# Patient Record
Sex: FEMALE | Race: WHITE | NOT HISPANIC OR LATINO | Employment: OTHER | URBAN - METROPOLITAN AREA
[De-identification: names, ages, dates, MRNs, and addresses within clinical notes are randomized per-mention and may not be internally consistent; named-entity substitution may affect disease eponyms.]

---

## 2017-08-18 DIAGNOSIS — R06.02 SOB (SHORTNESS OF BREATH): Primary | ICD-10-CM

## 2017-08-23 ENCOUNTER — HOSPITAL ENCOUNTER (OUTPATIENT)
Dept: CARDIOLOGY | Facility: CLINIC | Age: 77
Discharge: HOME | End: 2017-08-23
Attending: FAMILY MEDICINE | Admitting: FAMILY MEDICINE
Payer: COMMERCIAL

## 2017-08-23 DIAGNOSIS — R06.02 SOB (SHORTNESS OF BREATH): ICD-10-CM

## 2017-08-23 PROCEDURE — 93350 STRESS TTE ONLY: CPT | Mod: TC

## 2017-08-23 PROCEDURE — 93321 DOPPLER ECHO F-UP/LMTD STD: CPT | Mod: 26 | Performed by: INTERNAL MEDICINE

## 2017-08-23 PROCEDURE — 93018 CV STRESS TEST I&R ONLY: CPT | Performed by: INTERNAL MEDICINE

## 2017-08-23 PROCEDURE — 93016 CV STRESS TEST SUPVJ ONLY: CPT | Performed by: INTERNAL MEDICINE

## 2017-08-23 PROCEDURE — 93350 STRESS TTE ONLY: CPT | Mod: 26 | Performed by: INTERNAL MEDICINE

## 2017-08-23 PROCEDURE — 93325 DOPPLER ECHO COLOR FLOW MAPG: CPT | Mod: 26 | Performed by: INTERNAL MEDICINE

## 2019-01-04 ENCOUNTER — ANESTHESIA EVENT (OUTPATIENT)
Dept: SURGERY | Facility: CLINIC | Age: 79
DRG: 003 | End: 2019-01-04
Payer: COMMERCIAL

## 2019-01-04 ENCOUNTER — APPOINTMENT (OUTPATIENT)
Dept: GENERAL RADIOLOGY | Facility: CLINIC | Age: 79
DRG: 003 | End: 2019-01-04
Payer: COMMERCIAL

## 2019-01-04 ENCOUNTER — HOSPITAL ENCOUNTER (INPATIENT)
Facility: CLINIC | Age: 79
LOS: 25 days | Discharge: NURSING, REHAB OR LONG-TERM CARE | DRG: 003 | End: 2019-01-29
Attending: EMERGENCY MEDICINE | Admitting: THORACIC SURGERY (CARDIOTHORACIC VASCULAR SURGERY)
Payer: COMMERCIAL

## 2019-01-04 ENCOUNTER — APPOINTMENT (OUTPATIENT)
Dept: CT IMAGING | Facility: CLINIC | Age: 79
DRG: 003 | End: 2019-01-04
Payer: COMMERCIAL

## 2019-01-04 ENCOUNTER — ANESTHESIA (OUTPATIENT)
Dept: SURGERY | Facility: CLINIC | Age: 79
DRG: 003 | End: 2019-01-04
Payer: COMMERCIAL

## 2019-01-04 DIAGNOSIS — Z98.890 S/P AORTIC DISSECTION REPAIR: Primary | ICD-10-CM

## 2019-01-04 DIAGNOSIS — I71.010 DISSECTING ANEURYSM OF THORACIC AORTA, STANFORD TYPE A (H): ICD-10-CM

## 2019-01-04 PROBLEM — Z78.0 POSTMENOPAUSAL: Status: ACTIVE | Noted: 2017-02-20

## 2019-01-04 LAB
ABO + RH BLD: NORMAL
ABO + RH BLD: NORMAL
ALBUMIN SERPL-MCNC: 2.6 G/DL (ref 3.4–5)
ALBUMIN SERPL-MCNC: 3.6 G/DL (ref 3.4–5)
ALP SERPL-CCNC: 54 U/L (ref 40–150)
ALP SERPL-CCNC: 92 U/L (ref 40–150)
ALT SERPL W P-5'-P-CCNC: 24 U/L (ref 0–50)
ALT SERPL W P-5'-P-CCNC: 65 U/L (ref 0–50)
ANION GAP SERPL CALCULATED.3IONS-SCNC: 10 MMOL/L (ref 3–14)
ANION GAP SERPL CALCULATED.3IONS-SCNC: 13 MMOL/L (ref 3–14)
ANION GAP SERPL CALCULATED.3IONS-SCNC: 15 MMOL/L (ref 3–14)
APTT PPP: 34 SEC (ref 22–37)
APTT PPP: 47 SEC (ref 22–37)
AST SERPL W P-5'-P-CCNC: 147 U/L (ref 0–45)
AST SERPL W P-5'-P-CCNC: 27 U/L (ref 0–45)
BASE DEFICIT BLDA-SCNC: 2.2 MMOL/L
BASE DEFICIT BLDV-SCNC: 1.7 MMOL/L
BASOPHILS # BLD AUTO: 0.1 10E9/L (ref 0–0.2)
BASOPHILS NFR BLD AUTO: 0.3 %
BILIRUB SERPL-MCNC: 1.1 MG/DL (ref 0.2–1.3)
BILIRUB SERPL-MCNC: 1.3 MG/DL (ref 0.2–1.3)
BLD GP AB SCN SERPL QL: NORMAL
BLD PROD TYP BPU: NORMAL
BLD UNIT ID BPU: 0
BLOOD BANK CMNT PATIENT-IMP: NORMAL
BLOOD PRODUCT CODE: NORMAL
BPU ID: NORMAL
BUN SERPL-MCNC: 17 MG/DL (ref 7–30)
BUN SERPL-MCNC: 17 MG/DL (ref 7–30)
BUN SERPL-MCNC: 20 MG/DL (ref 7–30)
CA-I BLD-MCNC: 4.3 MG/DL (ref 4.4–5.2)
CA-I BLD-SCNC: 4.4 MG/DL (ref 4.4–5.2)
CALCIUM SERPL-MCNC: 7.2 MG/DL (ref 8.5–10.1)
CALCIUM SERPL-MCNC: 8.8 MG/DL (ref 8.5–10.1)
CALCIUM SERPL-MCNC: 9.2 MG/DL (ref 8.5–10.1)
CHLORIDE SERPL-SCNC: 105 MMOL/L (ref 94–109)
CHLORIDE SERPL-SCNC: 107 MMOL/L (ref 94–109)
CHLORIDE SERPL-SCNC: 109 MMOL/L (ref 94–109)
CO2 BLD-SCNC: 20 MMOL/L (ref 21–28)
CO2 BLD-SCNC: 20 MMOL/L (ref 21–28)
CO2 BLD-SCNC: 21 MMOL/L (ref 21–28)
CO2 BLD-SCNC: 23 MMOL/L (ref 21–28)
CO2 BLD-SCNC: 23 MMOL/L (ref 21–28)
CO2 BLD-SCNC: 25 MMOL/L (ref 21–28)
CO2 BLDCOV-SCNC: 20 MMOL/L (ref 21–28)
CO2 SERPL-SCNC: 21 MMOL/L (ref 20–32)
CO2 SERPL-SCNC: 22 MMOL/L (ref 20–32)
CO2 SERPL-SCNC: 24 MMOL/L (ref 20–32)
CPB APPLIED: ABNORMAL
CREAT SERPL-MCNC: 0.75 MG/DL (ref 0.52–1.04)
CREAT SERPL-MCNC: 0.8 MG/DL (ref 0.52–1.04)
CREAT SERPL-MCNC: 0.83 MG/DL (ref 0.52–1.04)
DIFFERENTIAL METHOD BLD: ABNORMAL
EOSINOPHIL # BLD AUTO: 0.1 10E9/L (ref 0–0.7)
EOSINOPHIL NFR BLD AUTO: 0.5 %
ERYTHROCYTE [DISTWIDTH] IN BLOOD BY AUTOMATED COUNT: 13.1 % (ref 10–15)
ERYTHROCYTE [DISTWIDTH] IN BLOOD BY AUTOMATED COUNT: 13.3 % (ref 10–15)
ERYTHROCYTE [DISTWIDTH] IN BLOOD BY AUTOMATED COUNT: 13.8 % (ref 10–15)
FIBRINOGEN PPP-MCNC: 148 MG/DL (ref 200–420)
GFR SERPL CREATININE-BSD FRML MDRD: 69 ML/MIN/{1.73_M2}
GFR SERPL CREATININE-BSD FRML MDRD: 72 ML/MIN/{1.73_M2}
GFR SERPL CREATININE-BSD FRML MDRD: 77 ML/MIN/{1.73_M2}
GLUCOSE BLDC GLUCOMTR-MCNC: 136 MG/DL (ref 70–99)
GLUCOSE BLDC GLUCOMTR-MCNC: 199 MG/DL (ref 70–99)
GLUCOSE BLDC GLUCOMTR-MCNC: 225 MG/DL (ref 70–99)
GLUCOSE BLDC GLUCOMTR-MCNC: 232 MG/DL (ref 70–99)
GLUCOSE SERPL-MCNC: 125 MG/DL (ref 70–99)
GLUCOSE SERPL-MCNC: 214 MG/DL (ref 70–99)
GLUCOSE SERPL-MCNC: 226 MG/DL (ref 70–99)
HBA1C MFR BLD: 5.3 % (ref 0–5.6)
HCO3 BLD-SCNC: 24 MMOL/L (ref 21–28)
HCO3 BLDV-SCNC: 25 MMOL/L (ref 21–28)
HCT VFR BLD AUTO: 19.7 % (ref 35–47)
HCT VFR BLD AUTO: 28.6 % (ref 35–47)
HCT VFR BLD AUTO: 39.7 % (ref 35–47)
HCT VFR BLD CALC: 20 %PCV (ref 35–47)
HCT VFR BLD CALC: 21 %PCV (ref 35–47)
HCT VFR BLD CALC: 22 %PCV (ref 35–47)
HCT VFR BLD CALC: 22 %PCV (ref 35–47)
HCT VFR BLD CALC: 26 %PCV (ref 35–47)
HCT VFR BLD CALC: 35 %PCV (ref 35–47)
HGB BLD CALC-MCNC: 11.9 G/DL (ref 11.7–15.7)
HGB BLD CALC-MCNC: 6.8 G/DL (ref 11.7–15.7)
HGB BLD CALC-MCNC: 7.1 G/DL (ref 11.7–15.7)
HGB BLD CALC-MCNC: 7.5 G/DL (ref 11.7–15.7)
HGB BLD CALC-MCNC: 7.5 G/DL (ref 11.7–15.7)
HGB BLD CALC-MCNC: 8.8 G/DL (ref 11.7–15.7)
HGB BLD-MCNC: 13.2 G/DL (ref 11.7–15.7)
HGB BLD-MCNC: 6.9 G/DL (ref 11.7–15.7)
HGB BLD-MCNC: 9.6 G/DL (ref 11.7–15.7)
IMM GRANULOCYTES # BLD: 0 10E9/L (ref 0–0.4)
IMM GRANULOCYTES NFR BLD: 0.2 %
INR PPP: 1.35 (ref 0.86–1.14)
INR PPP: 2.03 (ref 0.86–1.14)
LACTATE BLD-SCNC: 1.7 MMOL/L (ref 0.7–2)
LACTATE BLD-SCNC: 1.8 MMOL/L (ref 0.7–2.1)
LACTATE BLD-SCNC: 2.3 MMOL/L (ref 0.7–2.1)
LACTATE BLD-SCNC: 2.4 MMOL/L (ref 0.7–2)
LACTATE BLD-SCNC: 2.9 MMOL/L (ref 0.7–2.1)
LACTATE BLD-SCNC: 3.1 MMOL/L (ref 0.7–2)
LACTATE BLD-SCNC: 3.7 MMOL/L (ref 0.7–2.1)
LACTATE BLD-SCNC: 3.8 MMOL/L (ref 0.7–2.1)
LACTATE BLD-SCNC: 4.1 MMOL/L (ref 0.7–2.1)
LIPASE SERPL-CCNC: 145 U/L (ref 73–393)
LYMPHOCYTES # BLD AUTO: 1.7 10E9/L (ref 0.8–5.3)
LYMPHOCYTES NFR BLD AUTO: 9.5 %
MAGNESIUM SERPL-MCNC: 2.5 MG/DL (ref 1.6–2.3)
MCH RBC QN AUTO: 28.9 PG (ref 26.5–33)
MCH RBC QN AUTO: 30 PG (ref 26.5–33)
MCH RBC QN AUTO: 30.8 PG (ref 26.5–33)
MCHC RBC AUTO-ENTMCNC: 33.2 G/DL (ref 31.5–36.5)
MCHC RBC AUTO-ENTMCNC: 33.6 G/DL (ref 31.5–36.5)
MCHC RBC AUTO-ENTMCNC: 35 G/DL (ref 31.5–36.5)
MCV RBC AUTO: 86 FL (ref 78–100)
MCV RBC AUTO: 88 FL (ref 78–100)
MCV RBC AUTO: 90 FL (ref 78–100)
MONOCYTES # BLD AUTO: 1.2 10E9/L (ref 0–1.3)
MONOCYTES NFR BLD AUTO: 6.5 %
NEUTROPHILS # BLD AUTO: 14.7 10E9/L (ref 1.6–8.3)
NEUTROPHILS NFR BLD AUTO: 83 %
NRBC # BLD AUTO: 0 10*3/UL
NRBC BLD AUTO-RTO: 0 /100
NUM BPU REQUESTED: 2
NUM BPU REQUESTED: 2
NUM BPU REQUESTED: 4
NUM BPU REQUESTED: 4
OXYHGB MFR BLD: 98 % (ref 92–100)
OXYHGB MFR BLDV: 68 %
PCO2 BLD: 37 MM HG (ref 35–45)
PCO2 BLD: 38 MM HG (ref 35–45)
PCO2 BLD: 38 MM HG (ref 35–45)
PCO2 BLD: 41 MM HG (ref 35–45)
PCO2 BLD: 46 MM HG (ref 35–45)
PCO2 BLD: 64 MM HG (ref 35–45)
PCO2 BLD: 69 MM HG (ref 35–45)
PCO2 BLDV: 46 MM HG (ref 40–50)
PCO2 BLDV: 52 MM HG (ref 40–50)
PH BLD: 7.1 PH (ref 7.35–7.45)
PH BLD: 7.16 PH (ref 7.35–7.45)
PH BLD: 7.32 PH (ref 7.35–7.45)
PH BLD: 7.33 PH (ref 7.35–7.45)
PH BLD: 7.34 PH (ref 7.35–7.45)
PH BLD: 7.39 PH (ref 7.35–7.45)
PH BLD: 7.39 PH (ref 7.35–7.45)
PH BLDV: 7.24 PH (ref 7.32–7.43)
PH BLDV: 7.29 PH (ref 7.32–7.43)
PHOSPHATE SERPL-MCNC: 4.5 MG/DL (ref 2.5–4.5)
PLATELET # BLD AUTO: 122 10E9/L (ref 150–450)
PLATELET # BLD AUTO: 134 10E9/L (ref 150–450)
PLATELET # BLD AUTO: 227 10E9/L (ref 150–450)
PO2 BLD: 150 MM HG (ref 80–105)
PO2 BLD: 340 MM HG (ref 80–105)
PO2 BLD: 354 MM HG (ref 80–105)
PO2 BLD: 374 MM HG (ref 80–105)
PO2 BLD: 420 MM HG (ref 80–105)
PO2 BLD: 530 MM HG (ref 80–105)
PO2 BLD: 624 MM HG (ref 80–105)
PO2 BLDV: 38 MM HG (ref 25–47)
PO2 BLDV: 81 MM HG (ref 25–47)
POTASSIUM BLD-SCNC: 3.3 MMOL/L (ref 3.4–5.3)
POTASSIUM BLD-SCNC: 3.3 MMOL/L (ref 3.4–5.3)
POTASSIUM BLD-SCNC: 3.6 MMOL/L (ref 3.4–5.3)
POTASSIUM BLD-SCNC: 3.7 MMOL/L (ref 3.4–5.3)
POTASSIUM BLD-SCNC: 4.1 MMOL/L (ref 3.4–5.3)
POTASSIUM BLD-SCNC: 4.2 MMOL/L (ref 3.4–5.3)
POTASSIUM SERPL-SCNC: 3.1 MMOL/L (ref 3.4–5.3)
POTASSIUM SERPL-SCNC: 3.4 MMOL/L (ref 3.4–5.3)
POTASSIUM SERPL-SCNC: 3.6 MMOL/L (ref 3.4–5.3)
PROT SERPL-MCNC: 4.8 G/DL (ref 6.8–8.8)
PROT SERPL-MCNC: 6.8 G/DL (ref 6.8–8.8)
RADIOLOGIST FLAGS: ABNORMAL
RBC # BLD AUTO: 2.24 10E12/L (ref 3.8–5.2)
RBC # BLD AUTO: 3.32 10E12/L (ref 3.8–5.2)
RBC # BLD AUTO: 4.4 10E12/L (ref 3.8–5.2)
SAO2 % BLDA FROM PO2: 100 % (ref 92–100)
SAO2 % BLDV FROM PO2: 93 %
SODIUM BLD-SCNC: 137 MMOL/L (ref 133–144)
SODIUM BLD-SCNC: 139 MMOL/L (ref 133–144)
SODIUM BLD-SCNC: 141 MMOL/L (ref 133–144)
SODIUM BLD-SCNC: 142 MMOL/L (ref 133–144)
SODIUM SERPL-SCNC: 137 MMOL/L (ref 133–144)
SODIUM SERPL-SCNC: 144 MMOL/L (ref 133–144)
SODIUM SERPL-SCNC: 145 MMOL/L (ref 133–144)
SPECIMEN EXP DATE BLD: NORMAL
TRANSFUSION STATUS PATIENT QL: NORMAL
TROPONIN I SERPL-MCNC: <0.015 UG/L (ref 0–0.04)
WBC # BLD AUTO: 11.9 10E9/L (ref 4–11)
WBC # BLD AUTO: 15.1 10E9/L (ref 4–11)
WBC # BLD AUTO: 17.7 10E9/L (ref 4–11)

## 2019-01-04 PROCEDURE — 25000128 H RX IP 250 OP 636: Performed by: STUDENT IN AN ORGANIZED HEALTH CARE EDUCATION/TRAINING PROGRAM

## 2019-01-04 PROCEDURE — C1781 MESH (IMPLANTABLE): HCPCS | Performed by: THORACIC SURGERY (CARDIOTHORACIC VASCULAR SURGERY)

## 2019-01-04 PROCEDURE — P9041 ALBUMIN (HUMAN),5%, 50ML: HCPCS | Performed by: STUDENT IN AN ORGANIZED HEALTH CARE EDUCATION/TRAINING PROGRAM

## 2019-01-04 PROCEDURE — 85025 COMPLETE CBC W/AUTO DIFF WBC: CPT | Performed by: EMERGENCY MEDICINE

## 2019-01-04 PROCEDURE — 88305 TISSUE EXAM BY PATHOLOGIST: CPT | Mod: 26 | Performed by: THORACIC SURGERY (CARDIOTHORACIC VASCULAR SURGERY)

## 2019-01-04 PROCEDURE — 83605 ASSAY OF LACTIC ACID: CPT

## 2019-01-04 PROCEDURE — 96374 THER/PROPH/DIAG INJ IV PUSH: CPT | Mod: 59

## 2019-01-04 PROCEDURE — P9041 ALBUMIN (HUMAN),5%, 50ML: HCPCS | Performed by: NURSE ANESTHETIST, CERTIFIED REGISTERED

## 2019-01-04 PROCEDURE — P9037 PLATE PHERES LEUKOREDU IRRAD: HCPCS | Performed by: STUDENT IN AN ORGANIZED HEALTH CARE EDUCATION/TRAINING PROGRAM

## 2019-01-04 PROCEDURE — P9016 RBC LEUKOCYTES REDUCED: HCPCS | Performed by: EMERGENCY MEDICINE

## 2019-01-04 PROCEDURE — 82803 BLOOD GASES ANY COMBINATION: CPT

## 2019-01-04 PROCEDURE — 94002 VENT MGMT INPAT INIT DAY: CPT

## 2019-01-04 PROCEDURE — 25000131 ZZH RX MED GY IP 250 OP 636 PS 637: Performed by: THORACIC SURGERY (CARDIOTHORACIC VASCULAR SURGERY)

## 2019-01-04 PROCEDURE — 25000125 ZZHC RX 250: Performed by: NURSE ANESTHETIST, CERTIFIED REGISTERED

## 2019-01-04 PROCEDURE — 25000128 H RX IP 250 OP 636: Performed by: NURSE ANESTHETIST, CERTIFIED REGISTERED

## 2019-01-04 PROCEDURE — 86850 RBC ANTIBODY SCREEN: CPT | Performed by: EMERGENCY MEDICINE

## 2019-01-04 PROCEDURE — 93005 ELECTROCARDIOGRAM TRACING: CPT

## 2019-01-04 PROCEDURE — 93010 ELECTROCARDIOGRAM REPORT: CPT | Performed by: INTERNAL MEDICINE

## 2019-01-04 PROCEDURE — 80048 BASIC METABOLIC PNL TOTAL CA: CPT

## 2019-01-04 PROCEDURE — 82805 BLOOD GASES W/O2 SATURATION: CPT | Performed by: STUDENT IN AN ORGANIZED HEALTH CARE EDUCATION/TRAINING PROGRAM

## 2019-01-04 PROCEDURE — 83735 ASSAY OF MAGNESIUM: CPT | Performed by: STUDENT IN AN ORGANIZED HEALTH CARE EDUCATION/TRAINING PROGRAM

## 2019-01-04 PROCEDURE — P9059 PLASMA, FRZ BETWEEN 8-24HOUR: HCPCS | Performed by: STUDENT IN AN ORGANIZED HEALTH CARE EDUCATION/TRAINING PROGRAM

## 2019-01-04 PROCEDURE — 74177 CT ABD & PELVIS W/CONTRAST: CPT

## 2019-01-04 PROCEDURE — 84100 ASSAY OF PHOSPHORUS: CPT | Performed by: STUDENT IN AN ORGANIZED HEALTH CARE EDUCATION/TRAINING PROGRAM

## 2019-01-04 PROCEDURE — 83036 HEMOGLOBIN GLYCOSYLATED A1C: CPT | Performed by: EMERGENCY MEDICINE

## 2019-01-04 PROCEDURE — 80053 COMPREHEN METABOLIC PANEL: CPT | Performed by: STUDENT IN AN ORGANIZED HEALTH CARE EDUCATION/TRAINING PROGRAM

## 2019-01-04 PROCEDURE — 40000344 ZZHCL STATISTIC THAWING COMPONENT: Performed by: STUDENT IN AN ORGANIZED HEALTH CARE EDUCATION/TRAINING PROGRAM

## 2019-01-04 PROCEDURE — 71260 CT THORAX DX C+: CPT

## 2019-01-04 PROCEDURE — 02RX0JZ REPLACEMENT OF THORACIC AORTA, ASCENDING/ARCH WITH SYNTHETIC SUBSTITUTE, OPEN APPROACH: ICD-10-PCS | Performed by: THORACIC SURGERY (CARDIOTHORACIC VASCULAR SURGERY)

## 2019-01-04 PROCEDURE — 41000023 ZZH PERA-PERFUSION, SH ONLY,  EACH ADDTL 15 MIN: Performed by: THORACIC SURGERY (CARDIOTHORACIC VASCULAR SURGERY)

## 2019-01-04 PROCEDURE — 36000073 ZZH SURGERY LEVEL 6 1ST 30 MIN: Performed by: THORACIC SURGERY (CARDIOTHORACIC VASCULAR SURGERY)

## 2019-01-04 PROCEDURE — 25000128 H RX IP 250 OP 636: Performed by: SURGERY

## 2019-01-04 PROCEDURE — 82330 ASSAY OF CALCIUM: CPT

## 2019-01-04 PROCEDURE — 25000566 ZZH SEVOFLURANE, EA 15 MIN: Performed by: THORACIC SURGERY (CARDIOTHORACIC VASCULAR SURGERY)

## 2019-01-04 PROCEDURE — 88305 TISSUE EXAM BY PATHOLOGIST: CPT | Performed by: THORACIC SURGERY (CARDIOTHORACIC VASCULAR SURGERY)

## 2019-01-04 PROCEDURE — 5A1221Z PERFORMANCE OF CARDIAC OUTPUT, CONTINUOUS: ICD-10-PCS | Performed by: THORACIC SURGERY (CARDIOTHORACIC VASCULAR SURGERY)

## 2019-01-04 PROCEDURE — 85730 THROMBOPLASTIN TIME PARTIAL: CPT | Performed by: STUDENT IN AN ORGANIZED HEALTH CARE EDUCATION/TRAINING PROGRAM

## 2019-01-04 PROCEDURE — 25000128 H RX IP 250 OP 636: Performed by: EMERGENCY MEDICINE

## 2019-01-04 PROCEDURE — 36000075 ZZH SURGERY LEVEL 6 EA 15 ADDTL MIN: Performed by: THORACIC SURGERY (CARDIOTHORACIC VASCULAR SURGERY)

## 2019-01-04 PROCEDURE — C1768 GRAFT, VASCULAR: HCPCS | Performed by: THORACIC SURGERY (CARDIOTHORACIC VASCULAR SURGERY)

## 2019-01-04 PROCEDURE — C1763 CONN TISS, NON-HUMAN: HCPCS | Performed by: THORACIC SURGERY (CARDIOTHORACIC VASCULAR SURGERY)

## 2019-01-04 PROCEDURE — 20000003 ZZH R&B ICU

## 2019-01-04 PROCEDURE — C1894 INTRO/SHEATH, NON-LASER: HCPCS | Performed by: THORACIC SURGERY (CARDIOTHORACIC VASCULAR SURGERY)

## 2019-01-04 PROCEDURE — 80053 COMPREHEN METABOLIC PANEL: CPT | Performed by: EMERGENCY MEDICINE

## 2019-01-04 PROCEDURE — 83690 ASSAY OF LIPASE: CPT | Performed by: EMERGENCY MEDICINE

## 2019-01-04 PROCEDURE — P9012 CRYOPRECIPITATE EACH UNIT: HCPCS | Performed by: STUDENT IN AN ORGANIZED HEALTH CARE EDUCATION/TRAINING PROGRAM

## 2019-01-04 PROCEDURE — 85027 COMPLETE CBC AUTOMATED: CPT

## 2019-01-04 PROCEDURE — 86900 BLOOD TYPING SEROLOGIC ABO: CPT | Performed by: EMERGENCY MEDICINE

## 2019-01-04 PROCEDURE — 25000128 H RX IP 250 OP 636: Performed by: THORACIC SURGERY (CARDIOTHORACIC VASCULAR SURGERY)

## 2019-01-04 PROCEDURE — 40000008 ZZH STATISTIC AIRWAY CARE

## 2019-01-04 PROCEDURE — 37000008 ZZH ANESTHESIA TECHNICAL FEE, 1ST 30 MIN: Performed by: THORACIC SURGERY (CARDIOTHORACIC VASCULAR SURGERY)

## 2019-01-04 PROCEDURE — 37000009 ZZH ANESTHESIA TECHNICAL FEE, EACH ADDTL 15 MIN: Performed by: THORACIC SURGERY (CARDIOTHORACIC VASCULAR SURGERY)

## 2019-01-04 PROCEDURE — 85610 PROTHROMBIN TIME: CPT | Performed by: STUDENT IN AN ORGANIZED HEALTH CARE EDUCATION/TRAINING PROGRAM

## 2019-01-04 PROCEDURE — 96375 TX/PRO/DX INJ NEW DRUG ADDON: CPT

## 2019-01-04 PROCEDURE — 27211024 ZZHC OR SUPPLY OTHER OPNP: Performed by: THORACIC SURGERY (CARDIOTHORACIC VASCULAR SURGERY)

## 2019-01-04 PROCEDURE — 25000301 ZZH OR RX SURGIFLO W/THROMBIN KIT 2ML 1991 OPNP: Performed by: THORACIC SURGERY (CARDIOTHORACIC VASCULAR SURGERY)

## 2019-01-04 PROCEDURE — 00000146 ZZHCL STATISTIC GLUCOSE BY METER IP

## 2019-01-04 PROCEDURE — 99285 EMERGENCY DEPT VISIT HI MDM: CPT | Mod: 25

## 2019-01-04 PROCEDURE — 25000125 ZZHC RX 250: Performed by: SURGERY

## 2019-01-04 PROCEDURE — 85027 COMPLETE CBC AUTOMATED: CPT | Performed by: STUDENT IN AN ORGANIZED HEALTH CARE EDUCATION/TRAINING PROGRAM

## 2019-01-04 PROCEDURE — 86923 COMPATIBILITY TEST ELECTRIC: CPT | Performed by: EMERGENCY MEDICINE

## 2019-01-04 PROCEDURE — 40000275 ZZH STATISTIC RCP TIME EA 10 MIN

## 2019-01-04 PROCEDURE — 82330 ASSAY OF CALCIUM: CPT | Performed by: STUDENT IN AN ORGANIZED HEALTH CARE EDUCATION/TRAINING PROGRAM

## 2019-01-04 PROCEDURE — 84132 ASSAY OF SERUM POTASSIUM: CPT

## 2019-01-04 PROCEDURE — 84484 ASSAY OF TROPONIN QUANT: CPT | Performed by: EMERGENCY MEDICINE

## 2019-01-04 PROCEDURE — C1769 GUIDE WIRE: HCPCS | Performed by: THORACIC SURGERY (CARDIOTHORACIC VASCULAR SURGERY)

## 2019-01-04 PROCEDURE — 85730 THROMBOPLASTIN TIME PARTIAL: CPT

## 2019-01-04 PROCEDURE — 25800025 ZZH RX 258: Performed by: STUDENT IN AN ORGANIZED HEALTH CARE EDUCATION/TRAINING PROGRAM

## 2019-01-04 PROCEDURE — 86901 BLOOD TYPING SEROLOGIC RH(D): CPT | Performed by: EMERGENCY MEDICINE

## 2019-01-04 PROCEDURE — 99291 CRITICAL CARE FIRST HOUR: CPT | Performed by: INTERNAL MEDICINE

## 2019-01-04 PROCEDURE — 51702 INSERT TEMP BLADDER CATH: CPT

## 2019-01-04 PROCEDURE — 83605 ASSAY OF LACTIC ACID: CPT | Performed by: STUDENT IN AN ORGANIZED HEALTH CARE EDUCATION/TRAINING PROGRAM

## 2019-01-04 PROCEDURE — 85384 FIBRINOGEN ACTIVITY: CPT

## 2019-01-04 PROCEDURE — 85014 HEMATOCRIT: CPT

## 2019-01-04 PROCEDURE — 40000986 XR CHEST PORT 1 VW

## 2019-01-04 PROCEDURE — 84295 ASSAY OF SERUM SODIUM: CPT

## 2019-01-04 PROCEDURE — 25000125 ZZHC RX 250: Performed by: EMERGENCY MEDICINE

## 2019-01-04 PROCEDURE — 85610 PROTHROMBIN TIME: CPT

## 2019-01-04 PROCEDURE — 71046 X-RAY EXAM CHEST 2 VIEWS: CPT

## 2019-01-04 PROCEDURE — 41000022 ZZH PER-PERFUSION, SH ONLY,  1ST 30 MIN: Performed by: THORACIC SURGERY (CARDIOTHORACIC VASCULAR SURGERY)

## 2019-01-04 PROCEDURE — 27210794 ZZH OR GENERAL SUPPLY STERILE: Performed by: THORACIC SURGERY (CARDIOTHORACIC VASCULAR SURGERY)

## 2019-01-04 PROCEDURE — 40000277 XR SURGERY CARM FLUORO LESS THAN 5 MIN W STILLS

## 2019-01-04 DEVICE — GRAFT PTFE FELT 6X6" 007837: Type: IMPLANTABLE DEVICE | Site: AORTA | Status: FUNCTIONAL

## 2019-01-04 DEVICE — GRAFT PERICARDIUM 8X14CM PERI-GUARD PG0814: Type: IMPLANTABLE DEVICE | Site: AORTA | Status: FUNCTIONAL

## 2019-01-04 DEVICE — IMPLANTABLE DEVICE: Type: IMPLANTABLE DEVICE | Status: FUNCTIONAL

## 2019-01-04 DEVICE — LEAD PACER MYOCARDIAL BIPOLAR TEMPORARY 53CM 6495F
Type: IMPLANTABLE DEVICE | Status: NON-FUNCTIONAL
Removed: 2019-01-11

## 2019-01-04 RX ORDER — TRIAMTERENE/HYDROCHLOROTHIAZID 37.5-25 MG
0.5 TABLET ORAL DAILY
Status: ON HOLD | COMMUNITY
Start: 2017-06-30 | End: 2019-01-29

## 2019-01-04 RX ORDER — DEXTROSE MONOHYDRATE 25 G/50ML
25-50 INJECTION, SOLUTION INTRAVENOUS
Status: DISCONTINUED | OUTPATIENT
Start: 2019-01-04 | End: 2019-01-29 | Stop reason: HOSPADM

## 2019-01-04 RX ORDER — FENTANYL CITRATE 50 UG/ML
INJECTION, SOLUTION INTRAMUSCULAR; INTRAVENOUS PRN
Status: DISCONTINUED | OUTPATIENT
Start: 2019-01-04 | End: 2019-01-04

## 2019-01-04 RX ORDER — POTASSIUM CHLORIDE 1.5 G/1.58G
20-40 POWDER, FOR SOLUTION ORAL
Status: DISCONTINUED | OUTPATIENT
Start: 2019-01-04 | End: 2019-01-29 | Stop reason: HOSPADM

## 2019-01-04 RX ORDER — ACETAMINOPHEN 325 MG/1
650 TABLET ORAL EVERY 4 HOURS PRN
Status: DISCONTINUED | OUTPATIENT
Start: 2019-01-07 | End: 2019-01-29 | Stop reason: HOSPADM

## 2019-01-04 RX ORDER — CALCIUM CHLORIDE 100 MG/ML
INJECTION INTRAVENOUS; INTRAVENTRICULAR PRN
Status: DISCONTINUED | OUTPATIENT
Start: 2019-01-04 | End: 2019-01-04

## 2019-01-04 RX ORDER — SODIUM CHLORIDE 9 MG/ML
INJECTION, SOLUTION INTRAVENOUS CONTINUOUS
Status: DISCONTINUED | OUTPATIENT
Start: 2019-01-04 | End: 2019-01-29 | Stop reason: HOSPADM

## 2019-01-04 RX ORDER — CLINDAMYCIN PHOSPHATE 900 MG/50ML
900 INJECTION, SOLUTION INTRAVENOUS SEE ADMIN INSTRUCTIONS
Status: CANCELLED | OUTPATIENT
Start: 2019-01-04

## 2019-01-04 RX ORDER — NITROGLYCERIN 10 MG/100ML
INJECTION INTRAVENOUS PRN
Status: DISCONTINUED | OUTPATIENT
Start: 2019-01-04 | End: 2019-01-04

## 2019-01-04 RX ORDER — VANCOMYCIN HYDROCHLORIDE 1 G/200ML
1000 INJECTION, SOLUTION INTRAVENOUS SEE ADMIN INSTRUCTIONS
Status: CANCELLED | OUTPATIENT
Start: 2019-01-04

## 2019-01-04 RX ORDER — PROPOFOL 10 MG/ML
INJECTION, EMULSION INTRAVENOUS CONTINUOUS PRN
Status: DISCONTINUED | OUTPATIENT
Start: 2019-01-04 | End: 2019-01-04

## 2019-01-04 RX ORDER — MEPERIDINE HYDROCHLORIDE 25 MG/ML
12.5-25 INJECTION INTRAMUSCULAR; INTRAVENOUS; SUBCUTANEOUS
Status: DISCONTINUED | OUTPATIENT
Start: 2019-01-04 | End: 2019-01-18

## 2019-01-04 RX ORDER — OXYCODONE HYDROCHLORIDE 5 MG/1
5-10 TABLET ORAL EVERY 4 HOURS PRN
Status: DISCONTINUED | OUTPATIENT
Start: 2019-01-04 | End: 2019-01-29 | Stop reason: HOSPADM

## 2019-01-04 RX ORDER — METOPROLOL TARTRATE 25 MG/1
25 TABLET, FILM COATED ORAL EVERY 12 HOURS
Status: DISCONTINUED | OUTPATIENT
Start: 2019-01-05 | End: 2019-01-06 | Stop reason: DRUGHIGH

## 2019-01-04 RX ORDER — DEXTROSE MONOHYDRATE, SODIUM CHLORIDE, AND POTASSIUM CHLORIDE 50; 1.49; 4.5 G/1000ML; G/1000ML; G/1000ML
INJECTION, SOLUTION INTRAVENOUS CONTINUOUS
Status: DISCONTINUED | OUTPATIENT
Start: 2019-01-04 | End: 2019-01-06

## 2019-01-04 RX ORDER — LIDOCAINE HYDROCHLORIDE 20 MG/ML
INJECTION, SOLUTION INFILTRATION; PERINEURAL PRN
Status: DISCONTINUED | OUTPATIENT
Start: 2019-01-04 | End: 2019-01-04

## 2019-01-04 RX ORDER — CLINDAMYCIN PHOSPHATE 900 MG/50ML
900 INJECTION, SOLUTION INTRAVENOUS
Status: CANCELLED | OUTPATIENT
Start: 2019-01-04

## 2019-01-04 RX ORDER — HEPARIN SODIUM 1000 [USP'U]/ML
INJECTION, SOLUTION INTRAVENOUS; SUBCUTANEOUS PRN
Status: DISCONTINUED | OUTPATIENT
Start: 2019-01-04 | End: 2019-01-04

## 2019-01-04 RX ORDER — NICOTINE POLACRILEX 4 MG
15-30 LOZENGE BUCCAL
Status: DISCONTINUED | OUTPATIENT
Start: 2019-01-04 | End: 2019-01-29 | Stop reason: HOSPADM

## 2019-01-04 RX ORDER — DEXTROSE MONOHYDRATE 25 G/50ML
25-50 INJECTION, SOLUTION INTRAVENOUS
Status: DISCONTINUED | OUTPATIENT
Start: 2019-01-04 | End: 2019-01-04

## 2019-01-04 RX ORDER — IOPAMIDOL 755 MG/ML
80 INJECTION, SOLUTION INTRAVASCULAR ONCE
Status: COMPLETED | OUTPATIENT
Start: 2019-01-04 | End: 2019-01-04

## 2019-01-04 RX ORDER — ESMOLOL HYDROCHLORIDE 10 MG/ML
INJECTION INTRAVENOUS PRN
Status: DISCONTINUED | OUTPATIENT
Start: 2019-01-04 | End: 2019-01-04

## 2019-01-04 RX ORDER — ALBUMIN, HUMAN INJ 5% 5 %
500-1000 SOLUTION INTRAVENOUS
Status: COMPLETED | OUTPATIENT
Start: 2019-01-04 | End: 2019-01-04

## 2019-01-04 RX ORDER — POTASSIUM CL/LIDO/0.9 % NACL 10MEQ/0.1L
10 INTRAVENOUS SOLUTION, PIGGYBACK (ML) INTRAVENOUS
Status: DISCONTINUED | OUTPATIENT
Start: 2019-01-04 | End: 2019-01-29 | Stop reason: HOSPADM

## 2019-01-04 RX ORDER — PROPOFOL 10 MG/ML
INJECTION, EMULSION INTRAVENOUS PRN
Status: DISCONTINUED | OUTPATIENT
Start: 2019-01-04 | End: 2019-01-04

## 2019-01-04 RX ORDER — VANCOMYCIN HYDROCHLORIDE 1 G/200ML
1000 INJECTION, SOLUTION INTRAVENOUS EVERY 12 HOURS
Status: COMPLETED | OUTPATIENT
Start: 2019-01-05 | End: 2019-01-05

## 2019-01-04 RX ORDER — ACETAMINOPHEN 325 MG/1
975 TABLET ORAL ONCE
Status: CANCELLED | OUTPATIENT
Start: 2019-01-04 | End: 2019-01-04

## 2019-01-04 RX ORDER — POTASSIUM CHLORIDE 1500 MG/1
20-40 TABLET, EXTENDED RELEASE ORAL
Status: DISCONTINUED | OUTPATIENT
Start: 2019-01-04 | End: 2019-01-29 | Stop reason: HOSPADM

## 2019-01-04 RX ORDER — ONDANSETRON 2 MG/ML
INJECTION INTRAMUSCULAR; INTRAVENOUS
Status: DISCONTINUED
Start: 2019-01-04 | End: 2019-01-04 | Stop reason: HOSPADM

## 2019-01-04 RX ORDER — ALBUMIN, HUMAN INJ 5% 5 %
SOLUTION INTRAVENOUS CONTINUOUS PRN
Status: DISCONTINUED | OUTPATIENT
Start: 2019-01-04 | End: 2019-01-04

## 2019-01-04 RX ORDER — VANCOMYCIN HYDROCHLORIDE 1 G/200ML
1000 INJECTION, SOLUTION INTRAVENOUS
Status: CANCELLED | OUTPATIENT
Start: 2019-01-04

## 2019-01-04 RX ORDER — SODIUM CHLORIDE 9 MG/ML
INJECTION, SOLUTION INTRAVENOUS CONTINUOUS PRN
Status: DISCONTINUED | OUTPATIENT
Start: 2019-01-04 | End: 2019-01-04

## 2019-01-04 RX ORDER — ONDANSETRON 4 MG/1
4 TABLET, ORALLY DISINTEGRATING ORAL EVERY 6 HOURS PRN
Status: DISCONTINUED | OUTPATIENT
Start: 2019-01-04 | End: 2019-01-29 | Stop reason: HOSPADM

## 2019-01-04 RX ORDER — SODIUM CHLORIDE, SODIUM LACTATE, POTASSIUM CHLORIDE, CALCIUM CHLORIDE 600; 310; 30; 20 MG/100ML; MG/100ML; MG/100ML; MG/100ML
INJECTION, SOLUTION INTRAVENOUS CONTINUOUS PRN
Status: DISCONTINUED | OUTPATIENT
Start: 2019-01-04 | End: 2019-01-04

## 2019-01-04 RX ORDER — MUPIROCIN 20 MG/G
0.5 OINTMENT TOPICAL 2 TIMES DAILY
Status: DISCONTINUED | OUTPATIENT
Start: 2019-01-04 | End: 2019-01-06

## 2019-01-04 RX ORDER — CEFAZOLIN SODIUM 1 G/3ML
1 INJECTION, POWDER, FOR SOLUTION INTRAMUSCULAR; INTRAVENOUS EVERY 8 HOURS
Status: COMPLETED | OUTPATIENT
Start: 2019-01-05 | End: 2019-01-05

## 2019-01-04 RX ORDER — LIDOCAINE 40 MG/G
CREAM TOPICAL
Status: DISCONTINUED | OUTPATIENT
Start: 2019-01-04 | End: 2019-01-25

## 2019-01-04 RX ORDER — POTASSIUM CHLORIDE 7.45 MG/ML
10 INJECTION INTRAVENOUS
Status: DISCONTINUED | OUTPATIENT
Start: 2019-01-04 | End: 2019-01-29 | Stop reason: HOSPADM

## 2019-01-04 RX ORDER — METHOCARBAMOL 500 MG/1
500 TABLET, FILM COATED ORAL 4 TIMES DAILY PRN
Status: DISCONTINUED | OUTPATIENT
Start: 2019-01-04 | End: 2019-01-13

## 2019-01-04 RX ORDER — GABAPENTIN 100 MG/1
100 CAPSULE ORAL 3 TIMES DAILY
Status: DISCONTINUED | OUTPATIENT
Start: 2019-01-04 | End: 2019-01-07

## 2019-01-04 RX ORDER — ONDANSETRON 2 MG/ML
8 INJECTION INTRAMUSCULAR; INTRAVENOUS ONCE
Status: COMPLETED | OUTPATIENT
Start: 2019-01-04 | End: 2019-01-04

## 2019-01-04 RX ORDER — ONDANSETRON 2 MG/ML
4 INJECTION INTRAMUSCULAR; INTRAVENOUS EVERY 6 HOURS PRN
Status: DISCONTINUED | OUTPATIENT
Start: 2019-01-04 | End: 2019-01-29 | Stop reason: HOSPADM

## 2019-01-04 RX ORDER — VECURONIUM BROMIDE 1 MG/ML
INJECTION, POWDER, LYOPHILIZED, FOR SOLUTION INTRAVENOUS PRN
Status: DISCONTINUED | OUTPATIENT
Start: 2019-01-04 | End: 2019-01-04

## 2019-01-04 RX ORDER — FENTANYL CITRATE 50 UG/ML
25-100 INJECTION, SOLUTION INTRAMUSCULAR; INTRAVENOUS
Status: DISCONTINUED | OUTPATIENT
Start: 2019-01-04 | End: 2019-01-17 | Stop reason: ALTCHOICE

## 2019-01-04 RX ORDER — NALOXONE HYDROCHLORIDE 0.4 MG/ML
.1-.4 INJECTION, SOLUTION INTRAMUSCULAR; INTRAVENOUS; SUBCUTANEOUS
Status: DISCONTINUED | OUTPATIENT
Start: 2019-01-04 | End: 2019-01-12

## 2019-01-04 RX ORDER — ONDANSETRON 2 MG/ML
4 INJECTION INTRAMUSCULAR; INTRAVENOUS ONCE
Status: COMPLETED | OUTPATIENT
Start: 2019-01-04 | End: 2019-01-04

## 2019-01-04 RX ORDER — PROTAMINE SULFATE 10 MG/ML
INJECTION, SOLUTION INTRAVENOUS PRN
Status: DISCONTINUED | OUTPATIENT
Start: 2019-01-04 | End: 2019-01-04

## 2019-01-04 RX ORDER — ACETAMINOPHEN 325 MG/1
975 TABLET ORAL EVERY 8 HOURS
Status: DISPENSED | OUTPATIENT
Start: 2019-01-04 | End: 2019-01-07

## 2019-01-04 RX ORDER — POTASSIUM CHLORIDE 29.8 MG/ML
20 INJECTION INTRAVENOUS
Status: DISCONTINUED | OUTPATIENT
Start: 2019-01-04 | End: 2019-01-29 | Stop reason: HOSPADM

## 2019-01-04 RX ORDER — MAGNESIUM SULFATE HEPTAHYDRATE 40 MG/ML
4 INJECTION, SOLUTION INTRAVENOUS EVERY 4 HOURS PRN
Status: DISCONTINUED | OUTPATIENT
Start: 2019-01-04 | End: 2019-01-29 | Stop reason: HOSPADM

## 2019-01-04 RX ORDER — NICOTINE POLACRILEX 4 MG
15-30 LOZENGE BUCCAL
Status: DISCONTINUED | OUTPATIENT
Start: 2019-01-04 | End: 2019-01-04

## 2019-01-04 RX ORDER — CEFAZOLIN SODIUM 1 G/3ML
INJECTION, POWDER, FOR SOLUTION INTRAMUSCULAR; INTRAVENOUS PRN
Status: DISCONTINUED | OUTPATIENT
Start: 2019-01-04 | End: 2019-01-04

## 2019-01-04 RX ORDER — HYDRALAZINE HYDROCHLORIDE 20 MG/ML
10 INJECTION INTRAMUSCULAR; INTRAVENOUS EVERY 30 MIN PRN
Status: DISCONTINUED | OUTPATIENT
Start: 2019-01-04 | End: 2019-01-29 | Stop reason: HOSPADM

## 2019-01-04 RX ORDER — MAGNESIUM SULFATE HEPTAHYDRATE 40 MG/ML
2 INJECTION, SOLUTION INTRAVENOUS DAILY PRN
Status: DISCONTINUED | OUTPATIENT
Start: 2019-01-04 | End: 2019-01-29 | Stop reason: HOSPADM

## 2019-01-04 RX ORDER — NITROGLYCERIN 20 MG/100ML
.07-2 INJECTION INTRAVENOUS CONTINUOUS
Status: DISCONTINUED | OUTPATIENT
Start: 2019-01-04 | End: 2019-01-05

## 2019-01-04 RX ADMIN — MIDAZOLAM HYDROCHLORIDE 1 MG: 1 INJECTION, SOLUTION INTRAMUSCULAR; INTRAVENOUS at 17:12

## 2019-01-04 RX ADMIN — PHENYLEPHRINE HYDROCHLORIDE 100 MCG: 10 INJECTION, SOLUTION INTRAMUSCULAR; INTRAVENOUS; SUBCUTANEOUS at 20:23

## 2019-01-04 RX ADMIN — PHENYLEPHRINE HYDROCHLORIDE 100 MCG: 10 INJECTION, SOLUTION INTRAMUSCULAR; INTRAVENOUS; SUBCUTANEOUS at 20:27

## 2019-01-04 RX ADMIN — ROCURONIUM BROMIDE 50 MG: 10 INJECTION INTRAVENOUS at 16:01

## 2019-01-04 RX ADMIN — CALCIUM CHLORIDE 200 MG: 100 INJECTION, SOLUTION INTRAVENOUS at 20:18

## 2019-01-04 RX ADMIN — FENTANYL CITRATE 100 MCG: 50 INJECTION, SOLUTION INTRAMUSCULAR; INTRAVENOUS at 16:26

## 2019-01-04 RX ADMIN — FENTANYL CITRATE 50 MCG: 50 INJECTION, SOLUTION INTRAMUSCULAR; INTRAVENOUS at 20:46

## 2019-01-04 RX ADMIN — PHENYLEPHRINE HYDROCHLORIDE 100 MCG: 10 INJECTION, SOLUTION INTRAMUSCULAR; INTRAVENOUS; SUBCUTANEOUS at 19:59

## 2019-01-04 RX ADMIN — PHENYLEPHRINE HYDROCHLORIDE 100 MCG: 10 INJECTION, SOLUTION INTRAMUSCULAR; INTRAVENOUS; SUBCUTANEOUS at 21:36

## 2019-01-04 RX ADMIN — LIDOCAINE HYDROCHLORIDE 100 MG: 20 INJECTION, SOLUTION INFILTRATION; PERINEURAL at 15:59

## 2019-01-04 RX ADMIN — VECURONIUM BROMIDE 5 MG: 1 INJECTION, POWDER, LYOPHILIZED, FOR SOLUTION INTRAVENOUS at 16:39

## 2019-01-04 RX ADMIN — PROTAMINE SULFATE 280 MG: 10 INJECTION, SOLUTION INTRAVENOUS at 19:57

## 2019-01-04 RX ADMIN — PHENYLEPHRINE HYDROCHLORIDE 100 MCG: 10 INJECTION, SOLUTION INTRAMUSCULAR; INTRAVENOUS; SUBCUTANEOUS at 17:40

## 2019-01-04 RX ADMIN — PHENYLEPHRINE HYDROCHLORIDE 100 MCG: 10 INJECTION, SOLUTION INTRAMUSCULAR; INTRAVENOUS; SUBCUTANEOUS at 20:08

## 2019-01-04 RX ADMIN — EPINEPHRINE 0.03 MCG/KG/MIN: 1 INJECTION INTRAMUSCULAR; INTRAVENOUS; SUBCUTANEOUS at 20:01

## 2019-01-04 RX ADMIN — NITROGLYCERIN 40 MCG: 10 INJECTION INTRAVENOUS at 16:17

## 2019-01-04 RX ADMIN — PHENYLEPHRINE HYDROCHLORIDE 200 MCG: 10 INJECTION, SOLUTION INTRAMUSCULAR; INTRAVENOUS; SUBCUTANEOUS at 20:59

## 2019-01-04 RX ADMIN — CEFAZOLIN 2 G: 1 INJECTION, POWDER, FOR SOLUTION INTRAMUSCULAR; INTRAVENOUS at 16:41

## 2019-01-04 RX ADMIN — Medication 5 MG/HR: at 16:23

## 2019-01-04 RX ADMIN — NITROGLYCERIN 20 MCG: 10 INJECTION INTRAVENOUS at 16:14

## 2019-01-04 RX ADMIN — AMINOCAPROIC ACID 1 G/HR: 250 INJECTION, SOLUTION INTRAVENOUS at 17:32

## 2019-01-04 RX ADMIN — FENTANYL CITRATE 50 MCG: 50 INJECTION, SOLUTION INTRAMUSCULAR; INTRAVENOUS at 20:45

## 2019-01-04 RX ADMIN — PHENYLEPHRINE HYDROCHLORIDE 100 MCG: 10 INJECTION, SOLUTION INTRAMUSCULAR; INTRAVENOUS; SUBCUTANEOUS at 21:44

## 2019-01-04 RX ADMIN — SODIUM CHLORIDE 70 ML: 9 INJECTION, SOLUTION INTRAVENOUS at 14:43

## 2019-01-04 RX ADMIN — PROPOFOL 25 MCG/KG/MIN: 10 INJECTION, EMULSION INTRAVENOUS at 21:04

## 2019-01-04 RX ADMIN — NOREPINEPHRINE BITARTRATE 0.03 MCG/KG/MIN: 1 INJECTION INTRAVENOUS at 19:22

## 2019-01-04 RX ADMIN — NITROGLYCERIN 20 MCG: 10 INJECTION INTRAVENOUS at 16:16

## 2019-01-04 RX ADMIN — ALBUMIN HUMAN: 0.05 INJECTION, SOLUTION INTRAVENOUS at 17:33

## 2019-01-04 RX ADMIN — ALBUMIN HUMAN 1000 ML: 0.05 INJECTION, SOLUTION INTRAVENOUS at 23:49

## 2019-01-04 RX ADMIN — SODIUM CHLORIDE, POTASSIUM CHLORIDE, SODIUM LACTATE AND CALCIUM CHLORIDE: 600; 310; 30; 20 INJECTION, SOLUTION INTRAVENOUS at 16:28

## 2019-01-04 RX ADMIN — POTASSIUM CHLORIDE 20 MEQ: 400 INJECTION, SOLUTION INTRAVENOUS at 23:41

## 2019-01-04 RX ADMIN — SUCCINYLCHOLINE CHLORIDE 100 MG: 20 INJECTION, SOLUTION INTRAMUSCULAR; INTRAVENOUS; PARENTERAL at 15:59

## 2019-01-04 RX ADMIN — AMINOCAPROIC ACID 5 G/HR: 250 INJECTION, SOLUTION INTRAVENOUS at 16:32

## 2019-01-04 RX ADMIN — SODIUM CHLORIDE 2.5 UNITS/HR: 9 INJECTION, SOLUTION INTRAVENOUS at 23:47

## 2019-01-04 RX ADMIN — CALCIUM CHLORIDE 200 MG: 100 INJECTION, SOLUTION INTRAVENOUS at 20:14

## 2019-01-04 RX ADMIN — FENTANYL CITRATE 50 MCG: 50 INJECTION, SOLUTION INTRAMUSCULAR; INTRAVENOUS at 15:50

## 2019-01-04 RX ADMIN — ONDANSETRON 8 MG: 2 INJECTION INTRAMUSCULAR; INTRAVENOUS at 15:27

## 2019-01-04 RX ADMIN — HEPARIN SODIUM 31000 UNITS: 1000 INJECTION, SOLUTION INTRAVENOUS; SUBCUTANEOUS at 17:14

## 2019-01-04 RX ADMIN — MIDAZOLAM HYDROCHLORIDE 1 MG: 1 INJECTION, SOLUTION INTRAMUSCULAR; INTRAVENOUS at 19:40

## 2019-01-04 RX ADMIN — FENTANYL CITRATE 50 MCG: 50 INJECTION, SOLUTION INTRAMUSCULAR; INTRAVENOUS at 16:01

## 2019-01-04 RX ADMIN — SODIUM CHLORIDE: 9 INJECTION, SOLUTION INTRAVENOUS at 16:29

## 2019-01-04 RX ADMIN — MIDAZOLAM HYDROCHLORIDE 2 MG: 1 INJECTION, SOLUTION INTRAMUSCULAR; INTRAVENOUS at 18:12

## 2019-01-04 RX ADMIN — PHENYLEPHRINE HYDROCHLORIDE 100 MCG: 10 INJECTION, SOLUTION INTRAMUSCULAR; INTRAVENOUS; SUBCUTANEOUS at 17:15

## 2019-01-04 RX ADMIN — PHENYLEPHRINE HYDROCHLORIDE 100 MCG: 10 INJECTION, SOLUTION INTRAMUSCULAR; INTRAVENOUS; SUBCUTANEOUS at 20:57

## 2019-01-04 RX ADMIN — CEFAZOLIN 1 G: 1 INJECTION, POWDER, FOR SOLUTION INTRAMUSCULAR; INTRAVENOUS at 20:37

## 2019-01-04 RX ADMIN — POTASSIUM CHLORIDE, DEXTROSE MONOHYDRATE AND SODIUM CHLORIDE: 150; 5; 450 INJECTION, SOLUTION INTRAVENOUS at 23:48

## 2019-01-04 RX ADMIN — SODIUM CHLORIDE: 9 INJECTION, SOLUTION INTRAVENOUS at 15:57

## 2019-01-04 RX ADMIN — ESMOLOL HYDROCHLORIDE 20 MG: 10 INJECTION, SOLUTION INTRAVENOUS at 16:18

## 2019-01-04 RX ADMIN — FENTANYL CITRATE 400 MCG: 50 INJECTION, SOLUTION INTRAMUSCULAR; INTRAVENOUS at 15:59

## 2019-01-04 RX ADMIN — PHENYLEPHRINE HYDROCHLORIDE 0.25 MCG/KG/MIN: 10 INJECTION, SOLUTION INTRAMUSCULAR; INTRAVENOUS; SUBCUTANEOUS at 19:22

## 2019-01-04 RX ADMIN — IOPAMIDOL 80 ML: 755 INJECTION, SOLUTION INTRAVENOUS at 14:42

## 2019-01-04 RX ADMIN — CALCIUM CHLORIDE 200 MG: 100 INJECTION, SOLUTION INTRAVENOUS at 20:03

## 2019-01-04 RX ADMIN — MIDAZOLAM HYDROCHLORIDE 1 MG: 1 INJECTION, SOLUTION INTRAMUSCULAR; INTRAVENOUS at 15:50

## 2019-01-04 RX ADMIN — FENTANYL CITRATE 50 MCG: 50 INJECTION, SOLUTION INTRAMUSCULAR; INTRAVENOUS at 22:24

## 2019-01-04 RX ADMIN — ONDANSETRON 4 MG: 2 INJECTION INTRAMUSCULAR; INTRAVENOUS at 13:59

## 2019-01-04 RX ADMIN — VECURONIUM BROMIDE 5 MG: 1 INJECTION, POWDER, LYOPHILIZED, FOR SOLUTION INTRAVENOUS at 20:33

## 2019-01-04 RX ADMIN — PHENYLEPHRINE HYDROCHLORIDE 100 MCG: 10 INJECTION, SOLUTION INTRAMUSCULAR; INTRAVENOUS; SUBCUTANEOUS at 20:03

## 2019-01-04 RX ADMIN — PHENYLEPHRINE HYDROCHLORIDE 200 MCG: 10 INJECTION, SOLUTION INTRAMUSCULAR; INTRAVENOUS; SUBCUTANEOUS at 21:11

## 2019-01-04 RX ADMIN — SODIUM CHLORIDE: 9 INJECTION, SOLUTION INTRAVENOUS at 15:22

## 2019-01-04 RX ADMIN — SODIUM CHLORIDE 1000 ML: 9 INJECTION, SOLUTION INTRAVENOUS at 13:59

## 2019-01-04 RX ADMIN — PHENYLEPHRINE HYDROCHLORIDE 100 MCG: 10 INJECTION, SOLUTION INTRAMUSCULAR; INTRAVENOUS; SUBCUTANEOUS at 17:41

## 2019-01-04 RX ADMIN — CALCIUM CHLORIDE 200 MG: 100 INJECTION, SOLUTION INTRAVENOUS at 20:10

## 2019-01-04 RX ADMIN — SODIUM CHLORIDE, POTASSIUM CHLORIDE, SODIUM LACTATE AND CALCIUM CHLORIDE: 600; 310; 30; 20 INJECTION, SOLUTION INTRAVENOUS at 16:11

## 2019-01-04 RX ADMIN — PHENYLEPHRINE HYDROCHLORIDE 50 MCG: 10 INJECTION, SOLUTION INTRAMUSCULAR; INTRAVENOUS; SUBCUTANEOUS at 17:38

## 2019-01-04 RX ADMIN — VECURONIUM BROMIDE 5 MG: 1 INJECTION, POWDER, LYOPHILIZED, FOR SOLUTION INTRAVENOUS at 17:30

## 2019-01-04 RX ADMIN — PHENYLEPHRINE HYDROCHLORIDE 100 MCG: 10 INJECTION, SOLUTION INTRAMUSCULAR; INTRAVENOUS; SUBCUTANEOUS at 20:05

## 2019-01-04 RX ADMIN — CEFAZOLIN 1 G: 1 INJECTION, POWDER, FOR SOLUTION INTRAMUSCULAR; INTRAVENOUS at 18:40

## 2019-01-04 RX ADMIN — CALCIUM CHLORIDE 200 MG: 100 INJECTION, SOLUTION INTRAVENOUS at 20:06

## 2019-01-04 RX ADMIN — ESMOLOL HYDROCHLORIDE 20 MG: 10 INJECTION, SOLUTION INTRAVENOUS at 16:16

## 2019-01-04 RX ADMIN — PROPOFOL 90 MG: 10 INJECTION, EMULSION INTRAVENOUS at 15:59

## 2019-01-04 ASSESSMENT — ENCOUNTER SYMPTOMS
VOMITING: 0
DIAPHORESIS: 0
ABDOMINAL PAIN: 1
NAUSEA: 1
FATIGUE: 1

## 2019-01-04 ASSESSMENT — MIFFLIN-ST. JEOR: SCORE: 1246.11

## 2019-01-04 NOTE — PROGRESS NOTES
CVTS Staff Note    78 year old female who presented to [facility] ED earlier today with back and jaw pain.    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 This appears to extend into her left subclavian artery, as well as with flap extending to aortic bifurcation. Her SMA and right renal artery appear to be non perfused.    She has not reported any abdominal pain. She otherwise seems to be fairly healthy.    I explained to the patient and her family the extremely high mortality rate without surgery. I explained the operative procedure and the potential complications.    These include, but are not limited to, bleeding, infection, prolonged recovery, prolonged ventilation, end organ failure, stroke, and death. I quoted an operative mortality of between 10-20%.    They are amenable to proceed with emergent dissection repair.        Jose Winslow MD

## 2019-01-04 NOTE — ED NOTES
Bed: ED11  Expected date:   Expected time:   Means of arrival:   Comments:  carlyle Mace F dizzy eta 0420

## 2019-01-04 NOTE — ED PROVIDER NOTES
History     Chief Complaint:  Jaw pain    HPI   Priya Funez is a 78 year old female with a history of hypertension who presents with multiple complaints. Her largest complaint is her jaw pain which started just earlier around 1230.  She felt sudden onset terrible jaw pain while in the gas station on her way to visit her mother.  She states she fainted but her  states he was called into the gas station and she was doubled over and awake but nauseated, burping and in pain. The gas station was located in [place] and she was transported to the ED here at [facility] via ambulance, approximately 55 miles distance. Upon arrival with her , the patient reports nausea and generalized weakness. She does report a headache. She went for chest xray per protocol and just returned and since then has developed upper abdominal pain. She denies any chest pain, vomiting, sweating, loss of consciousness, blood in stool, or blood in vomit. No numbness, weakness, Of note: her  says she did try to vomit due to the nausea but was not able to. She also notes that her abdomen just started to hurt in the ED. The patient states having wine every once and a while and that she does not smoke.  She states she is healthy and only sees the doctor for her annual exam and occasionally as needed.      Allergies:  Amoxicillin   Ciprofloxacin    Medications:    aspirin  maxzide  actonel     Past Medical History:    Hypertension  Postmenopausal  Shortness of breath    Past Surgical History:    The patient does not have any pertinent past surgical history.    Family History:    No past pertinent family history.    Social History:  Marital Status:     Smoker:   Never   Smokeless:   Never   Alcohol:   Wine every once and a while  Drugs:   No   Lives at:   Unknown   Arrives with:      Clinic:    Unknown    Work:   Unknown     Review of Systems   Constitutional: Positive for fatigue. Negative for  "diaphoresis.   Cardiovascular: Negative for chest pain.   Gastrointestinal: Positive for abdominal pain and nausea. Negative for vomiting.   All other systems reviewed and are negative.    Physical Exam     Patient Vitals for the past 24 hrs:   BP Temp Temp src Pulse Heart Rate Resp SpO2 Height Weight   01/04/19 1505 106/85 -- -- 82 82 20 94 % -- --   01/04/19 1500 (!) 111/91 -- -- 82 85 9 92 % -- --   01/04/19 1455 (!) 120/102 -- -- 87 82 17 92 % -- --   01/04/19 1450 127/86 -- -- 82 80 19 96 % -- --   01/04/19 1435 -- -- -- -- -- -- 100 % -- --   01/04/19 1430 (!) 183/92 -- -- 65 -- -- 95 % -- --   01/04/19 1400 180/83 -- -- 85 -- -- 98 % -- --   01/04/19 1345 157/76 -- -- -- -- -- 98 % -- --   01/04/19 1343 -- -- -- 66 66 20 -- 1.626 m (5' 4\") 77.1 kg (170 lb)   01/04/19 1339 167/73 96.1  F (35.6  C) Temporal 63 -- 20 97 % -- --     Physical Exam  General: Well-nourished, appears to be very uncomfortable, moaning and states \"please help me\"  Eyes: PERRL, conjunctivae pale, no scleral icterus or conjunctival injection  ENT:  Moist mucus membranes, posterior oropharynx clear without erythema or exudates  Respiratory:  Lungs clear to auscultation bilaterally, no crackles/rubs/wheezes.  Good air movement.  Tachypneic  CV: Normal rate and rhythm, ?systolic ejection murmur, patient having difficulty slowing breathing. No gallops.  Pulses and cap refill symmetric in all four extremities  GI:  Abdomen soft and non-distended.  Normoactive BS.  Minimally tender in epigastrium, no guarding or rebound, no pulsatile mass  Skin: Warm, dry.  No rashes or petechiae  Musculoskeletal: No peripheral edema or calf tenderness  Neuro: Alert and oriented to person/place/time  Psychiatric: Normal affect    Emergency Department Course   ECG:  Indication: nausea   Time: 1347  Vent. Rate 64 bpm. MS interval 190. QRS duration 90. QT/QTc 460/474. P-R-T axis 18 14 14. Sinus rhythm with marked sinus arrhythmia. Otherwise normal ECG. Read time: " 1316    Imaging:  Radiographic findings were communicated with the patient and family who voiced understanding of the findings.    XR Chest PA & LAT:   PA and lateral views of the chest. Patient is slightly  rotated to the right on the frontal view. Lungs are clear. Heart is  normal in size. No effusions are evident. No pneumothorax. Hiatal  hernia with air-fluid level projects through the cardiac silhouette. as per radiology.     CT Aortic survey w contrast:   1. Extensive aortic dissection from the ascending thoracic aorta  through the proximal aortic bifurcation in the abdomen. 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 Correlate clinically for any  changes of ischemia involving the left upper extremity.  2. No other acute changes are evident in the chest, abdomen or pelvis.  [Critical Result: Aortic dissection]  Finding was identified on 1/4/2019 3:19 PM.   Dr. Alicia was contacted by me on 1/4/2019 3:31 PM and verbalized  understanding of the critical result.  As per radiology.     Laboratory:  CMP: Glucose 125, o/w WNL (Creatinine: 0.80)  CBC: WBC: 17.7, HGB: 13.2, PLT: 227  1400 Troponin: <0.015  Lipase: 145  ABO/Rh type and screen: ABO B, RH(D) Positive, Antibody Negative.  Blood component prepared.  Blood component prepared.    Interventions:  1359: NS 1L IV Bolus   1359: Zofran 4 mg IV  1527: Zofran 4 mg IV    Emergency Department Course:  Nursing notes and vitals reviewed. (1425) I performed an exam of the patient as documented above.     IV inserted. Medicine administered as documented above. Blood drawn. This was sent to the lab for further testing, results above.    The patient was sent for a Chest XR and CT while in the emergency department, findings above.     (1415) I went to see the patient but she was at  chest xray.  (1425) I saw the patient when she returned from chest xray. I reviewed chest xray in patient room.  (1430) CT aortic survey ordered and asked nurse to call to have table cleared and take patient stat  (1445) I was called by patient's nurse from CT scanner and informed of the aortic dissection.  Imaged not yet available.  Patient returned to stab room.  I asked HUC to call Aorta Red line.  Second large bore IV placed.  Type and screen ordered.    (1447) I rechecked the patient and discussed the results of her workup thus far. I informed her of her aortic dissection.  (1451)  Called respiratory.  (1452) Talked to vascular surgery  (1454)  Dr. Winslow, cardiothoracic surgeon. Consult. On his way to the ED, driving from the [place].  (1455)  Blood pressure in 120s  (1457)  The patient was rechecked and informed of surgery. The gravity of the situation was communicated. And the patient was educated on her condition.  (1458)  Anesthesia was consulted  (1501)  Blood [id number]  (1503)  Anesthesia consult again.   (1523)  I discussed situation with family.  (1530)  The cardiothoracic surgeon, Dr. Winslow, discussed surgery with the family and patient.   (1545) The patient was transferred to the OR for surgery.    Impression & Plan    Medical Decision Making:  Priya Funez is a 78 year old female who presented via EMS from approximately 55 miles away for sudden onset jaw pain, nausea and burping now with abdominal pain.  EKG looked nonischemic.  She seemed to have a new murmur on exam and appeared pale. Pulses were symmetric and her mental status was normal.  I was concerned based on her history about ACS vs aortic dissection.  She had gone for CXR per nursing protocol before I saw her and I reviewed the images in the room.  Her aortic appeared widened to me.  Given her stable vital signs, I sent her for a stat aortic survey, working with nursing to clear the table given my suspicion.  Unfortunately, this  did show an extensive Type A thoracoabdominal aortic dissection that was evident.  I called the Aorta Red line immediately and arrangements were made to take her to the OR emergently. While Dr. Vitale was driving here I did contact anesthesia to attempt to do preoperative evaluation and stabilization. The fellow and the CT physician's assistant were present in the emergency department.  I did attempt to assist with an arterial line which was difficult, likely due to her underlying dissection.   Our anesthesiologist was able to get this.  She will receive her additional central lines in the OR as Dr. Winslow.  Art line showed elevated blood pressure but antihypertensives were held as she will get this as soon as she gets to the OR.  I updated the patient and her  as well as there are children who were able to come to the ED and speak with her prior to going to the OR.  She is critically ill but remained with a stable blood pressure and mental status here in the ED.  IVF boluses were avoided.  She was typed and crossed but did not require blood transfusion at this time as her blood pressure and mental status remained normal.  She will go to the OR for definitive care.    Total Critical Care time: was 70 minutes for this patient excluding procedures. I was in direct attendance with her for the majority of this time monitoring blood pressure and mental status and also spent time directing nursing cares, on emergent consultation, blood pressure management and preoperative management and preparation.    Diagnosis:    ICD-10-CM    1. Dissecting aneurysm of thoracic aorta, [place] type A (H) I71.01      Disposition:  Transferred to the OR.    Scribe Disclosure:  Lobo MEJÍA, am serving as a scribe on 1/4/2019 at 2:25 PM to personally document services performed by Gabby Barnes MD based on my observations and the provider's statements to me.     Lobo Grove  1/4/2019    EMERGENCY DEPARTMENT       Gabby Barnes  MD  01/04/19 [id number]

## 2019-01-04 NOTE — ANESTHESIA PROCEDURE NOTES
CENTRAL LINE INSERTION PROCEDURE NOTE:   Pre-Procedure  Performed by Aaron Allred MD  Location: OR      Pre-Anesthestic Checklist: patient identified, risks and benefits discussed and informed consent    Timeout  Correct Patient: Yes   Correct Procedure: Yes   Correct Site: Yes   Correct Laterality: N/A   Correct Position: Yes   Site Marked: N/A   .   Procedure Documentation   Procedure: central line and new line (Introducer and PA Catheter)  Position: Trendelenburg  Patient Prep;all elements of maximal sterile barrier technique followed, mask, hat, sterile gown, sterile gloves, draped, hand hygiene, chlorhexidine gluconate and isopropyl alcohol, patient draped      Insertion Site:right, internal jugular  Using U/S with sterile probe cover and sterile gel, vein evaluated for patency/adequacy of cortis insertion is adequate, and using realtime U/S imaging the jacinta was punctured, and needle was observed entering vein on U/S. A permanent image is entered into the patient's record.     Catheter: PA Catheter, 9 Lithuanian, 10 cm (sheath introducer)  Assessment/Narrative  Complications: hematoma (hematoma along sternocleidomastoid muscle right neck )  Catheter: PA Catheter     Secured by suture  Tegaderm and Biopatch dressing used.  blood aspirated from all lumens  All lumens flushed: Yes  Verification method: Placement to be verified post-op  Comments:  Thin wall to right internal jugular x 1 wire passed easily. No dysrhythmias with wire insertion or floating PA catheter.   PA Catheter floated without difficulty.   Line placed in operating room post induction  Post procedure hematoma noted along right sternocleidomastoid muscle.  Discussed with Winston Winslow.

## 2019-01-04 NOTE — ANESTHESIA PREPROCEDURE EVALUATION
Anesthesia Pre-Procedure Evaluation    Patient: Priya Funez   MRN: [id number] : 1940          Preoperative Diagnosis: AORTIC DISSECTION    Procedure(s):  AORTIC DISSECTION REPAIR    Past Medical History:   Diagnosis Date     Hypertension      No past surgical history on file.      CT Aortic survey  CT AORTIC SURVEY WITH CONTRAST 2019 2:47 PM      HISTORY: See the Clinical Information for Interpreting Provider; jaw  pain, near syncope, abdominal pain, burping, evaluate for aortic  dissection.     TECHNIQUE: Initial noncontrast imaging is performed through the chest.  Additional thin section axial images from the thoracic inlet to the  symphysis are performed with additional coronal and sagittal  reformatted images. 80 mL of Isovue 370 are given intravenously.   Radiation dose for this scan was reduced using automated exposure  control, adjustment of the mA and/or kV according to patient size, or  iterative reconstruction technique.     FINDINGS:   Chest: Calcified granuloma medial right upper lobe is present on  series 6, image 18. Subpleural nodular density left lower lobe  measures 0.4 cm on image 58. Lungs are otherwise clear. No pleural or  pericardial fluid. Heart is normal in size. No enlarged lymph nodes.  Thyroid gland is normal in size. Central pulmonary arteries are  patent.     Ascending thoracic aorta is aneurysmal on initial noncontrast images  measuring up to 5.6 x 5.4 cm on series 4, image 30. Aortic dissection  is present starting in the ascending thoracic aorta and extends  through the aortic arch and descending thoracic aorta terminating in  the region of the abdominal aortic bifurcation into the right and left  common iliac arteries. Dissection flap also extends over a short  segment into the right brachiocephalic artery and left subclavian  artery. Flow in the left subclavian artery near the thoracic inlet on  series 5, image 17 markedly narrows compared to the  contralateral  right subclavian artery. This may be related to the dissection.  Correlate for any changes of ischemia in left upper extremity. A  larger and smaller lumen is present and contrast opacification in each  lumen appears fairly symmetric in the thoracic aorta. The celiac axis  origin and right renal artery origin appear to arise off the smaller  of the two lumens and there is marked decreased or absent enhancement  of the right kidney probably representing arterial occlusion. Celiac  axis appears well perfused. SMA appears to arise off the larger lumen  and after a short segment of patency is occluded. Loops of small bowel  in the abdomen demonstrate wall thickening probably related to  ischemia. Left renal artery arises off the larger lumen and the left  kidney appears perfused. The JARAD also appears to arise off the larger  lumen but is difficult to confirm but is patent. Both the right and  left iliac arteries are patent and appear well-perfused.     Abdomen: The liver with small left hepatic lobe cyst or hemangioma is  noted on series 5, image 90 and is unremarkable. The spleen,  gallbladder, pancreas and adrenal glands are within normal limits.  Right kidney again is not perfused. Nonobstructing renal collecting  system stones are noted in the right kidney. No hydronephrosis. Left  kidney appears perfused and no renal calculi are evident. No  hydronephrosis. Small bowel again demonstrates mild wall thickening  likely resulting from SMA occlusion and resulting ischemia. No  significant mesenteric stranding is currently evident. No  diverticulitis, obstruction or appendicitis.     Pelvis: The bladder, uterus and rectum are unremarkable. No enlarged  pelvic lymph nodes or free fluid. Bone window examination demonstrates  degenerative spine changes. No aggressive appearing bone lesions are  noted.                                                                      IMPRESSION:  1. Extensive aortic  dissection from the ascending thoracic aorta  through the proximal aortic bifurcation in the abdomen. 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 Correlate clinically for any  changes of ischemia involving the left upper extremity.  2. No other acute changes are evident in the chest, abdomen or pelvis.     [Critical Result: Aortic dissection]     Finding was identified on 1/4/2019 3:19 PM.      Anesthesia Evaluation     .             ROS/MED HX    ENT/Pulmonary:       Neurologic:       Cardiovascular: Comment: Aortic dissection, Type A    (+) hypertension----. : . . . :. .       METS/Exercise Tolerance:     Hematologic:         Musculoskeletal: Comment: Back pain        GI/Hepatic:         Renal/Genitourinary:         Endo:     (+) thyroid problem  hyperthyroidism, .      Psychiatric:         Infectious Disease:         Malignancy:         Other:                          Physical Exam  Normal systems: cardiovascular, pulmonary and dental    Airway   TM distance: >3 FB  Neck ROM: full    Dental     Cardiovascular   Rhythm and rate: regular and normal      Pulmonary    breath sounds clear to auscultation            Lab Results   Component Value Date    WBC 17.7 (H) 01/04/2019    HGB 13.2 01/04/2019    HCT 39.7 01/04/2019     01/04/2019     01/04/2019    POTASSIUM 3.4 01/04/2019    CHLORIDE 105 01/04/2019    CO2 22 01/04/2019    BUN 17 01/04/2019    CR 0.80 01/04/2019     (H) 01/04/2019    BESS 8.8 01/04/2019    ALBUMIN 3.6 01/04/2019    PROTTOTAL 6.8 01/04/2019    ALT 24 01/04/2019    AST 27 01/04/2019    ALKPHOS 92 01/04/2019    BILITOTAL 1.3 01/04/2019    LIPASE 145 01/04/2019       Preop Vitals  BP Readings from Last 3 Encounters:   01/04/19 106/59   09/14/08 118/80    Pulse Readings from Last 3  "Encounters:   01/04/19 81      Resp Readings from Last 3 Encounters:   01/04/19 12    SpO2 Readings from Last 3 Encounters:   01/04/19 94%      Temp Readings from Last 1 Encounters:   01/04/19 35.6  C (96.1  F) (Temporal)    Ht Readings from Last 1 Encounters:   01/04/19 1.626 m (5' 4\")      Wt Readings from Last 1 Encounters:   01/04/19 77.1 kg (170 lb)    Estimated body mass index is 29.18 kg/m  as calculated from the following:    Height as of this encounter: 1.626 m (5' 4\").    Weight as of this encounter: 77.1 kg (170 lb).       Anesthesia Plan      History & Physical Review  History and physical reviewed and following examination; no interval change.    ASA Status:  4 emergent.    NPO Status:  Waived due to emergency    Plan for General and ETT with Propofol induction. Maintenance will be Balanced.      Additional equipment: Videolaryngoscope, 2nd IV, Arterial Line, Central Line, PA Catheter and ALEX      Postoperative Care  Postoperative pain management:  IV analgesics.      Consents  Anesthetic plan, risks, benefits and alternatives discussed with:  Patient and Spouse.  Use of blood products discussed: Yes.   Use of blood products discussed with Patient and Spouse.  Consented to blood products.  .                 Aaron Allred MD  "

## 2019-01-05 ENCOUNTER — APPOINTMENT (OUTPATIENT)
Dept: GENERAL RADIOLOGY | Facility: CLINIC | Age: 79
DRG: 003 | End: 2019-01-05
Payer: COMMERCIAL

## 2019-01-05 LAB
ALBUMIN SERPL-MCNC: 3.4 G/DL (ref 3.4–5)
ALP SERPL-CCNC: 43 U/L (ref 40–150)
ALT SERPL W P-5'-P-CCNC: 75 U/L (ref 0–50)
ANION GAP SERPL CALCULATED.3IONS-SCNC: 11 MMOL/L (ref 3–14)
AST SERPL W P-5'-P-CCNC: 188 U/L (ref 0–45)
BASE DEFICIT BLDA-SCNC: 0.7 MMOL/L
BILIRUB SERPL-MCNC: 1.5 MG/DL (ref 0.2–1.3)
BLD PROD TYP BPU: NORMAL
BLD UNIT ID BPU: 0
BLOOD PRODUCT CODE: NORMAL
BPU ID: NORMAL
BUN SERPL-MCNC: 25 MG/DL (ref 7–30)
CA-I BLD-MCNC: 4.2 MG/DL (ref 4.4–5.2)
CALCIUM SERPL-MCNC: 7.4 MG/DL (ref 8.5–10.1)
CHLORIDE SERPL-SCNC: 109 MMOL/L (ref 94–109)
CO2 SERPL-SCNC: 23 MMOL/L (ref 20–32)
CREAT SERPL-MCNC: 1.04 MG/DL (ref 0.52–1.04)
ERYTHROCYTE [DISTWIDTH] IN BLOOD BY AUTOMATED COUNT: 14.8 % (ref 10–15)
GFR SERPL CREATININE-BSD FRML MDRD: 52 ML/MIN/{1.73_M2}
GLUCOSE BLDC GLUCOMTR-MCNC: 102 MG/DL (ref 70–99)
GLUCOSE BLDC GLUCOMTR-MCNC: 103 MG/DL (ref 70–99)
GLUCOSE BLDC GLUCOMTR-MCNC: 106 MG/DL (ref 70–99)
GLUCOSE BLDC GLUCOMTR-MCNC: 107 MG/DL (ref 70–99)
GLUCOSE BLDC GLUCOMTR-MCNC: 114 MG/DL (ref 70–99)
GLUCOSE BLDC GLUCOMTR-MCNC: 117 MG/DL (ref 70–99)
GLUCOSE BLDC GLUCOMTR-MCNC: 120 MG/DL (ref 70–99)
GLUCOSE BLDC GLUCOMTR-MCNC: 123 MG/DL (ref 70–99)
GLUCOSE BLDC GLUCOMTR-MCNC: 127 MG/DL (ref 70–99)
GLUCOSE BLDC GLUCOMTR-MCNC: 154 MG/DL (ref 70–99)
GLUCOSE BLDC GLUCOMTR-MCNC: 160 MG/DL (ref 70–99)
GLUCOSE BLDC GLUCOMTR-MCNC: 177 MG/DL (ref 70–99)
GLUCOSE BLDC GLUCOMTR-MCNC: 180 MG/DL (ref 70–99)
GLUCOSE BLDC GLUCOMTR-MCNC: 212 MG/DL (ref 70–99)
GLUCOSE BLDC GLUCOMTR-MCNC: 262 MG/DL (ref 70–99)
GLUCOSE BLDC GLUCOMTR-MCNC: 91 MG/DL (ref 70–99)
GLUCOSE SERPL-MCNC: 167 MG/DL (ref 70–99)
HCO3 BLD-SCNC: 24 MMOL/L (ref 21–28)
HCT VFR BLD AUTO: 24.9 % (ref 35–47)
HGB BLD-MCNC: 8.2 G/DL (ref 11.7–15.7)
LACTATE BLD-SCNC: 1.6 MMOL/L (ref 0.7–2)
LACTATE BLD-SCNC: 1.8 MMOL/L (ref 0.7–2)
LACTATE BLD-SCNC: 2.1 MMOL/L (ref 0.7–2)
LACTATE BLD-SCNC: 2.4 MMOL/L (ref 0.7–2)
LACTATE BLD-SCNC: 2.6 MMOL/L (ref 0.7–2)
MAGNESIUM SERPL-MCNC: 2.3 MG/DL (ref 1.6–2.3)
MCH RBC QN AUTO: 28.3 PG (ref 26.5–33)
MCHC RBC AUTO-ENTMCNC: 32.9 G/DL (ref 31.5–36.5)
MCV RBC AUTO: 86 FL (ref 78–100)
OXYHGB MFR BLD: 98 % (ref 92–100)
PCO2 BLD: 37 MM HG (ref 35–45)
PH BLD: 7.41 PH (ref 7.35–7.45)
PHOSPHATE SERPL-MCNC: 4.1 MG/DL (ref 2.5–4.5)
PLATELET # BLD AUTO: 106 10E9/L (ref 150–450)
PO2 BLD: 192 MM HG (ref 80–105)
POTASSIUM SERPL-SCNC: 4.6 MMOL/L (ref 3.4–5.3)
PROT SERPL-MCNC: 5.2 G/DL (ref 6.8–8.8)
RBC # BLD AUTO: 2.9 10E12/L (ref 3.8–5.2)
SODIUM SERPL-SCNC: 143 MMOL/L (ref 133–144)
TRANSFUSION STATUS PATIENT QL: NORMAL
TRANSFUSION STATUS PATIENT QL: NORMAL
WBC # BLD AUTO: 8.8 10E9/L (ref 4–11)

## 2019-01-05 PROCEDURE — 84100 ASSAY OF PHOSPHORUS: CPT | Performed by: STUDENT IN AN ORGANIZED HEALTH CARE EDUCATION/TRAINING PROGRAM

## 2019-01-05 PROCEDURE — C9113 INJ PANTOPRAZOLE SODIUM, VIA: HCPCS | Performed by: STUDENT IN AN ORGANIZED HEALTH CARE EDUCATION/TRAINING PROGRAM

## 2019-01-05 PROCEDURE — 99291 CRITICAL CARE FIRST HOUR: CPT | Performed by: INTERNAL MEDICINE

## 2019-01-05 PROCEDURE — 25000131 ZZH RX MED GY IP 250 OP 636 PS 637: Performed by: THORACIC SURGERY (CARDIOTHORACIC VASCULAR SURGERY)

## 2019-01-05 PROCEDURE — 25000125 ZZHC RX 250: Performed by: STUDENT IN AN ORGANIZED HEALTH CARE EDUCATION/TRAINING PROGRAM

## 2019-01-05 PROCEDURE — 85027 COMPLETE CBC AUTOMATED: CPT | Performed by: STUDENT IN AN ORGANIZED HEALTH CARE EDUCATION/TRAINING PROGRAM

## 2019-01-05 PROCEDURE — 00000146 ZZHCL STATISTIC GLUCOSE BY METER IP

## 2019-01-05 PROCEDURE — 71045 X-RAY EXAM CHEST 1 VIEW: CPT

## 2019-01-05 PROCEDURE — 82805 BLOOD GASES W/O2 SATURATION: CPT | Performed by: STUDENT IN AN ORGANIZED HEALTH CARE EDUCATION/TRAINING PROGRAM

## 2019-01-05 PROCEDURE — P9016 RBC LEUKOCYTES REDUCED: HCPCS | Performed by: EMERGENCY MEDICINE

## 2019-01-05 PROCEDURE — 40000275 ZZH STATISTIC RCP TIME EA 10 MIN

## 2019-01-05 PROCEDURE — 94003 VENT MGMT INPAT SUBQ DAY: CPT

## 2019-01-05 PROCEDURE — 25000128 H RX IP 250 OP 636: Performed by: THORACIC SURGERY (CARDIOTHORACIC VASCULAR SURGERY)

## 2019-01-05 PROCEDURE — 83735 ASSAY OF MAGNESIUM: CPT | Performed by: STUDENT IN AN ORGANIZED HEALTH CARE EDUCATION/TRAINING PROGRAM

## 2019-01-05 PROCEDURE — 82330 ASSAY OF CALCIUM: CPT | Performed by: STUDENT IN AN ORGANIZED HEALTH CARE EDUCATION/TRAINING PROGRAM

## 2019-01-05 PROCEDURE — 20000003 ZZH R&B ICU

## 2019-01-05 PROCEDURE — 83605 ASSAY OF LACTIC ACID: CPT | Performed by: STUDENT IN AN ORGANIZED HEALTH CARE EDUCATION/TRAINING PROGRAM

## 2019-01-05 PROCEDURE — 25000128 H RX IP 250 OP 636: Performed by: STUDENT IN AN ORGANIZED HEALTH CARE EDUCATION/TRAINING PROGRAM

## 2019-01-05 PROCEDURE — P9041 ALBUMIN (HUMAN),5%, 50ML: HCPCS | Performed by: STUDENT IN AN ORGANIZED HEALTH CARE EDUCATION/TRAINING PROGRAM

## 2019-01-05 PROCEDURE — 80053 COMPREHEN METABOLIC PANEL: CPT | Performed by: STUDENT IN AN ORGANIZED HEALTH CARE EDUCATION/TRAINING PROGRAM

## 2019-01-05 PROCEDURE — 25000128 H RX IP 250 OP 636: Performed by: INTERNAL MEDICINE

## 2019-01-05 RX ORDER — HEPARIN SODIUM 5000 [USP'U]/.5ML
5000 INJECTION, SOLUTION INTRAVENOUS; SUBCUTANEOUS EVERY 8 HOURS
Status: DISCONTINUED | OUTPATIENT
Start: 2019-01-05 | End: 2019-01-11 | Stop reason: DRUGHIGH

## 2019-01-05 RX ORDER — ALBUMIN, HUMAN INJ 5% 5 %
25 SOLUTION INTRAVENOUS ONCE
Status: COMPLETED | OUTPATIENT
Start: 2019-01-05 | End: 2019-01-06

## 2019-01-05 RX ORDER — PROPOFOL 10 MG/ML
5-75 INJECTION, EMULSION INTRAVENOUS CONTINUOUS
Status: DISCONTINUED | OUTPATIENT
Start: 2019-01-05 | End: 2019-01-05 | Stop reason: CLARIF

## 2019-01-05 RX ORDER — NITROGLYCERIN 20 MG/100ML
.07-2 INJECTION INTRAVENOUS CONTINUOUS
Status: DISCONTINUED | OUTPATIENT
Start: 2019-01-05 | End: 2019-01-13

## 2019-01-05 RX ADMIN — VANCOMYCIN HYDROCHLORIDE 1000 MG: 1 INJECTION, SOLUTION INTRAVENOUS at 00:54

## 2019-01-05 RX ADMIN — MUPIROCIN 0.5 G: 20 OINTMENT TOPICAL at 09:15

## 2019-01-05 RX ADMIN — FENTANYL CITRATE 50 MCG: 50 INJECTION, SOLUTION INTRAMUSCULAR; INTRAVENOUS at 01:00

## 2019-01-05 RX ADMIN — CEFAZOLIN SODIUM 1 G: 1 INJECTION, POWDER, FOR SOLUTION INTRAMUSCULAR; INTRAVENOUS at 11:08

## 2019-01-05 RX ADMIN — POTASSIUM CHLORIDE 20 MEQ: 400 INJECTION, SOLUTION INTRAVENOUS at 02:17

## 2019-01-05 RX ADMIN — SODIUM CHLORIDE: 9 INJECTION, SOLUTION INTRAVENOUS at 23:10

## 2019-01-05 RX ADMIN — FENTANYL CITRATE 50 MCG: 50 INJECTION, SOLUTION INTRAMUSCULAR; INTRAVENOUS at 18:14

## 2019-01-05 RX ADMIN — MUPIROCIN 0.5 G: 20 OINTMENT TOPICAL at 00:55

## 2019-01-05 RX ADMIN — PANTOPRAZOLE SODIUM 40 MG: 40 INJECTION, POWDER, FOR SOLUTION INTRAVENOUS at 09:12

## 2019-01-05 RX ADMIN — CALCIUM GLUCONATE 1 G: 98 INJECTION, SOLUTION INTRAVENOUS at 00:53

## 2019-01-05 RX ADMIN — CALCIUM GLUCONATE 1 G: 98 INJECTION, SOLUTION INTRAVENOUS at 08:16

## 2019-01-05 RX ADMIN — SODIUM CHLORIDE: 9 INJECTION, SOLUTION INTRAVENOUS at 00:16

## 2019-01-05 RX ADMIN — PROPOFOL 30 MCG/KG/MIN: 10 INJECTION, EMULSION INTRAVENOUS at 01:17

## 2019-01-05 RX ADMIN — CEFAZOLIN SODIUM 1 G: 1 INJECTION, POWDER, FOR SOLUTION INTRAMUSCULAR; INTRAVENOUS at 19:27

## 2019-01-05 RX ADMIN — ONDANSETRON 4 MG: 2 INJECTION INTRAMUSCULAR; INTRAVENOUS at 03:26

## 2019-01-05 RX ADMIN — FENTANYL CITRATE 50 MCG: 50 INJECTION, SOLUTION INTRAMUSCULAR; INTRAVENOUS at 05:29

## 2019-01-05 RX ADMIN — SODIUM CHLORIDE 4 UNITS/HR: 9 INJECTION, SOLUTION INTRAVENOUS at 06:18

## 2019-01-05 RX ADMIN — FENTANYL CITRATE 50 MCG: 50 INJECTION, SOLUTION INTRAMUSCULAR; INTRAVENOUS at 12:17

## 2019-01-05 RX ADMIN — FENTANYL CITRATE 50 MCG: 50 INJECTION, SOLUTION INTRAMUSCULAR; INTRAVENOUS at 14:57

## 2019-01-05 RX ADMIN — VANCOMYCIN HYDROCHLORIDE 1000 MG: 1 INJECTION, SOLUTION INTRAVENOUS at 12:19

## 2019-01-05 RX ADMIN — FENTANYL CITRATE 50 MCG: 50 INJECTION, SOLUTION INTRAMUSCULAR; INTRAVENOUS at 08:47

## 2019-01-05 RX ADMIN — MUPIROCIN 0.5 G: 20 OINTMENT TOPICAL at 22:07

## 2019-01-05 RX ADMIN — ALBUMIN HUMAN 25 G: 0.05 INJECTION, SOLUTION INTRAVENOUS at 22:42

## 2019-01-05 RX ADMIN — CEFAZOLIN SODIUM 1 G: 1 INJECTION, POWDER, FOR SOLUTION INTRAMUSCULAR; INTRAVENOUS at 04:57

## 2019-01-05 RX ADMIN — POTASSIUM CHLORIDE 20 MEQ: 400 INJECTION, SOLUTION INTRAVENOUS at 04:02

## 2019-01-05 RX ADMIN — ONDANSETRON 4 MG: 2 INJECTION INTRAMUSCULAR; INTRAVENOUS at 19:27

## 2019-01-05 RX ADMIN — FENTANYL CITRATE 50 MCG: 50 INJECTION, SOLUTION INTRAMUSCULAR; INTRAVENOUS at 22:00

## 2019-01-05 ASSESSMENT — ACTIVITIES OF DAILY LIVING (ADL)
ADLS_ACUITY_SCORE: 16
ADLS_ACUITY_SCORE: 16
ADLS_ACUITY_SCORE: 15
ADLS_ACUITY_SCORE: 16

## 2019-01-05 ASSESSMENT — MIFFLIN-ST. JEOR: SCORE: 1315

## 2019-01-05 NOTE — PROGRESS NOTES
[facility]  Cardiovascular and Thoracic Surgery Daily Note          Assessment and Plan:   POD#1 s/p repair of Type A aortic dissection by Dr. Winston Winslow.  -CVS: HR WNL. Has required volume resuscitation in the immediate postop period, including 1 unit rbc. Weaning pressor support. Lactates mostly normal. Goal MAP >60 with SBP < 110 mmHg.   -Resp: Wean to extubation  -Neuro: IV fentanyl prn; will transition to PO meds after extubation  -Renal: Cr remains normal with good UOP. Will closely monitor given nonperfusion of R kidney on imaging. AM BMP  -GI: NPO. Will cont NPO after extubation given concern for SMA thrombosis. Exam and lactate reassuring. Repeat CTA in next day or two to evaluate SMA.  -:  Cont coello.  -Endo: Cont insulin drip  -FEN: Replete electrolytes as needed  -ID: Prophylactic abx x 48 hours post op  -Heme: 1 unit rbc given this AM after drift in Hgb and need for volume resuscitation. AM CBC.   -Proph: Heparin 5000 mg IV q8 hrs, PPI  -Dispo: cont ICU          Interval History:   To OR yesterday for repair of Type A aortic dissection. No acute issues overnight.          Medications:       acetaminophen  975 mg Oral Q8H     ceFAZolin  1 g Intravenous Q8H     gabapentin  100 mg Oral TID     metoprolol tartrate  25 mg Oral Q12H    Or     metoprolol  25 mg Per NG tube Q12H     mupirocin  0.5 g Both Nostrils BID     pantoprazole (PROTONIX) IV  40 mg Intravenous Daily     sodium chloride (PF)  3 mL Intracatheter Q8H     [START ON 1/7/2019] acetaminophen, IV fluid REPLACEMENT ONLY, glucose **OR** dextrose **OR** glucagon, fentaNYL, hydrALAZINE, lidocaine 4%, lidocaine (buffered or not buffered), magnesium sulfate, magnesium sulfate, meperidine, methocarbamol, naloxone, ondansetron **OR** ondansetron, oxyCODONE IR, potassium chloride, potassium chloride with lidocaine, potassium chloride, potassium chloride, potassium chloride, sodium chloride (PF), sodium phosphate, sodium phosphate,  "sodium phosphate, sodium phosphate          Physical Exam:   Vitals were reviewed  Blood pressure 106/59, pulse 81, temperature 99.9  F (37.7  C), resp. rate 27, height 1.626 m (5' 4\"), weight 91.6 kg (201 lb 15.1 oz), SpO2 95 %.  Rhythm: NSR    Lungs: On vent    Cardiovascular: RRR    Abdomen: soft, NT, ND    Extremeties: min edema bilat    Incision: CDI    CT: 150 ml out    Weight:   Vitals:    01/04/19 1343 01/05/19 0600   Weight: 77.1 kg (170 lb) 91.6 kg (201 lb 15.1 oz)            Data:   Labs:   Lab Results   Component Value Date    WBC 8.8 01/05/2019     Lab Results   Component Value Date    RBC 2.90 01/05/2019     Lab Results   Component Value Date    HGB 8.2 01/05/2019     Lab Results   Component Value Date    HCT 24.9 01/05/2019     No components found for: MCT  Lab Results   Component Value Date    MCV 86 01/05/2019     Lab Results   Component Value Date    MCH 28.3 01/05/2019     Lab Results   Component Value Date    MCHC 32.9 01/05/2019     Lab Results   Component Value Date    RDW 14.8 01/05/2019     Lab Results   Component Value Date     01/05/2019       Last Basic Metabolic Panel:  Lab Results   Component Value Date     01/05/2019      Lab Results   Component Value Date    POTASSIUM 4.6 01/05/2019     Lab Results   Component Value Date    CHLORIDE 109 01/05/2019     Lab Results   Component Value Date    BESS 7.4 01/05/2019     Lab Results   Component Value Date    CO2 23 01/05/2019     Lab Results   Component Value Date    BUN 25 01/05/2019     Lab Results   Component Value Date    CR 1.04 01/05/2019     Lab Results   Component Value Date     01/05/2019       CXR 1/5/2019:  IMPRESSION: Tubes are unchanged in position. Minimal right basilar  atelectasis. No pleural effusions or pneumothorax. Stable  cardiomegaly.     Bobby Wharton MD  Fellow, CT Surgery      "

## 2019-01-05 NOTE — BRIEF OP NOTE
[facility]    Brief Operative Note    Pre-operative diagnosis: AORTIC DISSECTION  Post-operative diagnosis * No post-op diagnosis entered *  Procedure: Procedure(s):  AORTIC DISSECTION REPAIR WITH GRAFT- HEMASHIELD PLATINUM WOVEN DOUBLE VELOR 4 SIDE ARM VASCULAR GRAFT, D:35 X 12 X 10 X 10MM/ L:50CM  DIAGONSTIC ANGIOGRAM OF SMA  Surgeon: Surgeon(s) and Role:  Panel 1:     * Jose Winslow MD - Primary     * Joyce Mabry MD - Assisting     * Bobby Wharton MD - Assisting  Panel 2:     * Rigo Jennings MD - Primary  Anesthesia: General   Estimated blood loss: 1200  Drains: Chest tubes  Specimens:   ID Type Source Tests Collected by Time Destination   A : aorta wall Tissue Other SURGICAL PATHOLOGY EXAM Jose Winslow MD 1/4/2019  6:14 PM      Findings:   see op note.  Complications: None.

## 2019-01-05 NOTE — CONSULTS
[facility]   CRITICAL CARE ADMISSION/CONSULTATION    Priya Funez MRN: [id number]  1940  Date of Admission:1/4/2019          HPI   Priya Funez IS a 78 year old female admitted on 1/4/2019 with significant history for HTN who presented with sudden onset of back pain found to have type A aortic dissection and sent for emergency repair. She is POD#0 for aortic dissection repair with graft. Bypass time was 114min. Net I/O was 3.9L. She is sent to ICU for post-op care.            Past Medical History:      Past Medical History:   Diagnosis Date     Hypertension              Past Surgical History:    No past surgical history on file.         Social History:     Social History     Tobacco Use     Smoking status: Never Smoker     Smokeless tobacco: Never Used     Tobacco comment: hisband   Substance Use Topics     Alcohol use: No     Frequency: Never            Family History:   No family history on file.          Allergies:   Please see allergy list which was reviewed this admission.         Medications:       acetaminophen  975 mg Oral Q8H     [START ON 1/5/2019] ceFAZolin  1 g Intravenous Q8H     gabapentin  100 mg Oral TID     [START ON 1/5/2019] insulin aspart   Subcutaneous TID w/meals     [START ON 1/5/2019] metoprolol tartrate  25 mg Oral Q12H    Or     [START ON 1/5/2019] metoprolol  25 mg Per NG tube Q12H     mupirocin  0.5 g Both Nostrils BID     [START ON 1/5/2019] pantoprazole (PROTONIX) IV  40 mg Intravenous Daily     sodium chloride (PF)  3 mL Intracatheter Q8H     [START ON 1/5/2019] vancomycin (VANCOCIN) IV  1,000 mg Intravenous Q12H     [START ON 1/7/2019] acetaminophen, albumin human, IV fluid REPLACEMENT ONLY, glucose **OR** dextrose **OR** glucagon, fentaNYL, hydrALAZINE, insulin aspart, lidocaine 4%, lidocaine (buffered or not buffered), magnesium sulfate, magnesium sulfate, meperidine, methocarbamol, naloxone, ondansetron **OR** ondansetron, oxyCODONE IR, potassium chloride,  potassium chloride with lidocaine, potassium chloride, potassium chloride, potassium chloride, sodium chloride (PF), sodium phosphate, sodium phosphate, sodium phosphate, sodium phosphate         Review of Systems:   Unable to assess due to critical illness         Physical Examination:   Temp:  [96.1  F (35.6  C)] 96.1  F (35.6  C)  Pulse:  [63-87] 81  Heart Rate:  [66-85] 78  Resp:  [9-20] 12  BP: ()/() 106/59  FiO2 (%):  [60 %] 60 %  SpO2:  [86 %-100 %] 100 %    Intake/Output Summary (Last 24 hours) at 1/4/2019 2311  Last data filed at 1/4/2019 2211  Gross per 24 hour   Intake 6301 ml   Output 2636 ml   Net 3665 ml     Wt Readings from Last 4 Encounters:   01/04/19 77.1 kg (170 lb)   09/14/08 80.7 kg (178 lb)     BP - Mean:  [] 68  Ventilation Mode: CMV/AC  (Continuous Mandatory Ventilation/ Assist Control)  FiO2 (%): 60 %  Rate Set (breaths/minute): 12 breaths/min  Tidal Volume Set (mL): 450 mL  PEEP (cm H2O): 5 cmH2O  Oxygen Concentration (%): 60 %  Resp: 12    Recent Labs   Lab 01/04/19  1948 01/04/19  1921 01/04/19  1853 01/04/19  1821   PH 7.39 7.39 7.10* 7.16*   PCO2 38 41 69* 64*   PO2 340* 420* 530* 624*   HCO3 23 25 21 23       GEN: sedated, intubated  HEENT: intubated. RIJ sheath with central line and PA catheter  PULM: unlabored synchronous with vent, clear anteriorly    CV/COR: RRR S1S2 no gallop,  No rub, no murmur. Incision c/d/i. 2 chest tubes on suction.  ABD: incision c/d/i  EXT:  Edema   warm  NEURO: grossly intact  SKIN: no obvious rash      Assessment and plan :     Neurology/Psychiatry/Pain/Sedation:   1. Sedation for vent synchrony. On propofol with RASS -4. Plan to wean vasopressors and trend shock labs. Will consider SAT if these remain stable tonight. PRN analgesia with fentanyl.     Cardiovascular/Hemodynamics/Renal/Fluids/Electrolytes/hematology:   1. POD#0 Type A aortic dissection repair. Hemodynamically stable on levophed and phenylephrine. Plan to wean per MAP goals  and shock labs. Remains non-oliguric. On aminocaproic acid gtt. Recs per CTS.     Pulmonary/Ventilator Management:   1. Acute respiratory failure 2/2 above. Easy to oxygenate and ventilate. Mechanics adequate. CXR reviewed with good positioning of lines and tubes. Lung parenchyma clear. Await ABG to optimize vent settings. SBT when able.     GI/Nutrition:   1. NPO for now. PPI for ppx.     Endocrine/Hematology/Oncology:   1. ICU glucose protocol    Disposition/Code Status/Other  1. Critically ill 2/2 type-A dissection and now post-repair.   2. Code: Full     ICU Prophylaxis:   1. DVT: mechanical  2. VAP: HOB 30 degrees, chlorhexidine rinse  3. Stress Ulcer: PPI/H2 blocker  4. Restraints: Nonviolent soft two point restraints required and necessary for patient safety and continued cares and good effect as patient continues to pull at necessary lines, tubes despite education and distraction. Will readdress daily.   5. IV Access - central access required and necessary for continued patient cares  6. Feeding - NPO    I have personally reviewed the daily labs, imaging studies, cultures and discussed the case with referring physician and consulting physicians.     This patient is critically ill and I have provided 30 minutes of critical care time (excluding procedures) on January 4, 2019.     Jay Barnes MD   Critical Care Staff  [phone]         Data:   All data and imaging reviewed     ROUTINE ICU LABS (Last four results)  CMP  Recent Labs   Lab 01/04/19 2012 01/04/19  1948 01/04/19  1920 01/04/19  1853  01/04/19  1400   * 142 141 139   < > 137   POTASSIUM 3.6 4.2 3.3* 4.1   < > 3.4   CHLORIDE 109  --   --   --   --  105   CO2 21  --   --   --   --  22   ANIONGAP 15*  --   --   --   --  10   *  --   --   --   --  125*   BUN 17  --   --   --   --  17   CR 0.75  --   --   --   --  0.80   GFRESTIMATED 77  --   --   --   --  72   GFRESTBLACK 89  --   --   --   --  83   BESS 9.2  --   --   --   --  8.8   PROTTOTAL   --   --   --   --   --  6.8   ALBUMIN  --   --   --   --   --  3.6   BILITOTAL  --   --   --   --   --  1.3   ALKPHOS  --   --   --   --   --  92   AST  --   --   --   --   --  27   ALT  --   --   --   --   --  24    < > = values in this interval not displayed.     CBC  Recent Labs   Lab 01/04/19 2012 01/04/19 1948 01/04/19 1920 01/04/19 1853 01/04/19  1400   WBC 15.1*  --   --   --   --  17.7*   RBC 2.24*  --   --   --   --  4.40   HGB 6.9* 7.5* 6.8* 7.5*   < > 13.2   HCT 19.7*  --   --   --   --  39.7   MCV 88  --   --   --   --  90   MCH 30.8  --   --   --   --  30.0   MCHC 35.0  --   --   --   --  33.2   RDW 13.3  --   --   --   --  13.1   *  --   --   --   --  227    < > = values in this interval not displayed.     INR  Recent Labs   Lab 01/04/19 2012   INR 2.03*     Arterial Blood Gas  Recent Labs   Lab 01/04/19 1948 01/04/19 1921 01/04/19 1853 01/04/19  1821   PH 7.39 7.39 7.10* 7.16*   PCO2 38 41 69* 64*   PO2 340* 420* 530* 624*   HCO3 23 25 21 23       All cultures:  No results for input(s): CULT in the last 168 hours.  Recent Results (from the past 24 hour(s))   Chest XR,  PA & LAT    Narrative    CHEST TWO VIEW   1/4/2019 2:23 PM     HISTORY: Shortness of breath, chest pain.    COMPARISON: None.      Impression    IMPRESSION: PA and lateral views of the chest. Patient is slightly  rotated to the right on the frontal view. Lungs are clear. Heart is  normal in size. No effusions are evident. No pneumothorax. Hiatal  hernia with air-fluid level projects through the cardiac silhouette.    KENZIE MARTE MD   CT Aortic Survey w Contrast   Result Value    Radiologist flags Aortic dissection (AA)    Narrative    CT AORTIC SURVEY WITH CONTRAST 1/4/2019 2:47 PM     HISTORY: See the Clinical Information for Interpreting Provider; jaw  pain, near syncope, abdominal pain, burping, evaluate for aortic  dissection.    TECHNIQUE: Initial noncontrast imaging is performed through the chest.  Additional thin  section axial images from the thoracic inlet to the  symphysis are performed with additional coronal and sagittal  reformatted images. 80 mL of Isovue 370 are given intravenously.   Radiation dose for this scan was reduced using automated exposure  control, adjustment of the mA and/or kV according to patient size, or  iterative reconstruction technique.    FINDINGS:   Chest: Calcified granuloma medial right upper lobe is present on  series 6, image 18. Subpleural nodular density left lower lobe  measures 0.4 cm on image 58. Lungs are otherwise clear. No pleural or  pericardial fluid. Heart is normal in size. No enlarged lymph nodes.  Thyroid gland is normal in size. Central pulmonary arteries are  patent.    Ascending thoracic aorta is aneurysmal on initial noncontrast images  measuring up to 5.6 x 5.4 cm on series 4, image 30. Aortic dissection  is present starting in the ascending thoracic aorta and extends  through the aortic arch and descending thoracic aorta terminating in  the region of the abdominal aortic bifurcation into the right and left  common iliac arteries. Dissection flap also extends over a short  segment into the right brachiocephalic artery and left subclavian  artery. Flow in the left subclavian artery near the thoracic inlet on  series 5, image 17 markedly narrows compared to the contralateral  right subclavian artery. This may be related to the dissection.  Correlate for any changes of ischemia in left upper extremity. A  larger and smaller lumen is present and contrast opacification in each  lumen appears fairly symmetric in the thoracic aorta. The celiac axis  origin and right renal artery origin appear to arise off the smaller  of the two lumens and there is marked decreased or absent enhancement  of the right kidney probably representing arterial occlusion. Celiac  axis appears well perfused. SMA appears to arise off the larger lumen  and after a short segment of patency is occluded. Loops  of small bowel  in the abdomen demonstrate wall thickening probably related to  ischemia. Left renal artery arises off the larger lumen and the left  kidney appears perfused. The JARAD also appears to arise off the larger  lumen but is difficult to confirm but is patent. Both the right and  left iliac arteries are patent and appear well-perfused.    Abdomen: The liver with small left hepatic lobe cyst or hemangioma is  noted on series 5, image 90 and is unremarkable. The spleen,  gallbladder, pancreas and adrenal glands are within normal limits.  Right kidney again is not perfused. Nonobstructing renal collecting  system stones are noted in the right kidney. No hydronephrosis. Left  kidney appears perfused and no renal calculi are evident. No  hydronephrosis. Small bowel again demonstrates mild wall thickening  likely resulting from SMA occlusion and resulting ischemia. No  significant mesenteric stranding is currently evident. No  diverticulitis, obstruction or appendicitis.    Pelvis: The bladder, uterus and rectum are unremarkable. No enlarged  pelvic lymph nodes or free fluid. Bone window examination demonstrates  degenerative spine changes. No aggressive appearing bone lesions are  noted.      Impression    IMPRESSION:  1. Extensive aortic dissection from the ascending thoracic aorta  through the proximal aortic bifurcation in the abdomen. 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 Correlate clinically for any  changes of ischemia involving the left upper extremity.  2. No other acute changes are evident in the chest, abdomen or pelvis.    [Critical Result: Aortic dissection]    Finding was identified on 1/4/2019 3:19 PM.     Dr. Alicia was contacted by me on 1/4/2019 3:31 PM and verbalized  understanding of the  critical result.     KENZIE MARTE MD   XR Surgery LANDRY L/T 5 Min Fluoro w Stills    Narrative    This exam was marked as non-reportable because it will not be read by a   radiologist or a [place] non-radiologist provider.

## 2019-01-05 NOTE — PROGRESS NOTES
[facility] ICU RESPIRATORY NOTE  Date of Admission: 1/4/19  Date of Intubation (most recent): 1/4/19  Reason for Mechanical Ventilation: Aortic Dissection   Number of Days on Mechanical Ventilation: 2  Met Criteria for Pressure Support  Trial:  No  Length of Pressure Support Trial:    Reason for Stopping Pressure Support Trial:  Reason for No Pressure Support Trial: Per MD     Significant Events Today: Pt to ICU from OR     ABG Results:   Recent Labs   Lab 01/04/19 2255 01/04/19 1948 01/04/19  1921 01/04/19  1853   PH 7.32* 7.39 7.39 7.10*   PCO2 46* 38 41 69*   PO2 150* 340* 420* 530*   HCO3 24 23 25 21     Ventilation Mode: CMV/AC  (Continuous Mandatory Ventilation/ Assist Control)  FiO2 (%): 60 %  Rate Set (breaths/minute): 12 breaths/min  Tidal Volume Set (mL): 450 mL  PEEP (cm H2O): 5 cmH2O  Oxygen Concentration (%): 60 %  Resp: 12         ETT appearance on chest x-ray: In good position         Plan:   Will continue to follow.     Narinder Chavarria RT

## 2019-01-05 NOTE — ANESTHESIA PROCEDURE NOTES
ARTERIAL LINE PROCEDURE NOTE:   Pre-Procedure  Performed by Gabby Graham  Location: pre-op      Pre-Anesthestic Checklist: patient identified, IV checked, risks and benefits discussed and informed consent    Timeout  Correct Patient: Yes   Correct Procedure: Yes   Correct Site: Yes   Correct Laterality: N/A   Correct Position: Yes   Site Marked: N/A   .   Procedure Documentation  Procedure: arterial line    Supine  Insertion Site:right.Skin infiltrated with mL of 1% lidocaine. Injection technique: Seldinger Technique and ultrasound guided  .  .  Patient Prep;chlorhexidine gluconate and isopropyl alcohol, patient draped    Assessment/Narrative    Catheter: 20 gauge, 12 cm     Secured by suture  Tegaderm dressing used.    Arterial waveform: Yes     Comments:  Arterial Line  No complications    severeal attempts by myself and ER physician--a-line attained on third attempt.

## 2019-01-05 NOTE — PLAN OF CARE
Patient out of OR at 2238 post surgical repair for dissected AAA. Sedation gradually weaned down, pt opened eyes spontaneously, followed commands with all extremities, denies abdominal pain. Fentanyl given for incisional pain x2. 1L Albumin infused over 4 hours, blood pressures improved and patient was weaned off pressors while albumin running, approximately one hour after albumin stopped pt's blood pressure was low requiring julio césar and epi to be restarted, labs drawn,  and Dr. Wharton updated. Transfusing one unit of PRBCs now, wean to extubate if pressor requirement do not increase with blood transfusion. Incisions CDI. Generalized edema. 3 loose orange ? Pink color stools. Chest tubes to -20cm suction with minimal dark red drainage. Adequate urine output. Family updated.

## 2019-01-05 NOTE — PROVIDER NOTIFICATION
Dr. Wharton updated with morning labs, pt alert, following commands, denies pain, 2 loose stools (orange in color) overnight, vent weaning initiate however pt now requiring vasopressors again after being off for several hours overnight while albumin was infusing. Orders obtained for one unit of PRBCs, continue with vent weaning, if vasopressor requirements do not increase ok to extubate.

## 2019-01-05 NOTE — ANESTHESIA POSTPROCEDURE EVALUATION
Patient: Priya Funez    Procedure(s):  AORTIC DISSECTION REPAIR WITH GRAFT- HEMASHIELD PLATINUM WOVEN DOUBLE VELOR 4 SIDE ARM VASCULAR GRAFT, D:35 X 12 X 10 X 10MM/ L:50CM  DIAGONSTIC ANGIOGRAM OF SMA    Diagnosis:AORTIC DISSECTION  Diagnosis Additional Information: No value filed.    Anesthesia Type:  No value filed.    Note:  Anesthesia Post Evaluation    Patient location during evaluation: ICU  Patient participation: Unable to participate in evaluation secondary to underlying medical condition  Post-procedure mental status: sedated and intubated.  Pain management: adequate  Airway patency: intubated and mechanically ventilated.  Cardiovascular status: acceptable and hemodynamically stable (stable on epinephrine and phenylephrine gtts)  Respiratory status: ETT and ventilator  Hydration status: acceptable  PONV: none       Comments: Signout given to ICU team in patient's room.  Patient stable throughout transport.         Last vitals:  Vitals:    01/04/19 1525 01/04/19 1530 01/04/19 2259   BP: (!) 107/94 106/59    Pulse: 78 81    Resp: 13 12    Temp:      SpO2: 94% 94% 100%         Electronically Signed By: Trip Obrien MD  January 4, 2019  11:50 PM

## 2019-01-05 NOTE — CONSULTS
CARDIAC SURGERY NUTRITION CONSULT    Received standing order to assess and educate patient.    Will follow and complete assessment once patient is extubated and/or is transferred to medical unit.    Patient will receive nutrition education during the Outpatient Cardiac Rehab Program (nutrition classes/dietitian counseling).      Caroline Garg RD, LD  Clinical Dietitian

## 2019-01-05 NOTE — PROGRESS NOTES
Patient bedside extubated to Aerosol mask 40% and 8L with SpO2 96%. BBS clear bilaterally without stridor. Patient tolerated well and will continue to monitor.    Mat Hicks  RT  1/5/2019

## 2019-01-05 NOTE — PROGRESS NOTES
Leak test performed per MD. Deflated the cuff and there is no leak at all.     Jesus Sifuentes RT.

## 2019-01-05 NOTE — OP NOTE
Procedure Date: 01/04/2019      PREOPERATIVE DIAGNOSES:   1.  Type A aortic dissection.   2.  Hypertension.      POSTOPERATIVE DIAGNOSES:    1.  Type A aortic dissection.     2.  Hypertension.      OPERATIONS:   1.  Left groin cutdown with exposure of the femoral vessels with arterial cannulation for cardiopulmonary bypass.   2.  Right groin cutdown with exposure of the femoral vessels with the right femoral artery ultimately used for diagnostic angiography.   3.  Replacement of the ascending aorta with a 34 mm Hemashield branch graft under deep hypothermic circulatory arrest.   4.  Aortic valve resuspension.   5.  Visceral angiography.   6.  Repair of the bilateral femoral vessels.      SURGEON:  Jose Winslow MD      ASSISTANT:  Joyce Mabry MD, Bobby Wharton MD       CONSULTING SURGEON:  Rigo Jennings MD       ANESTHESIA:  General.      INDICATIONS FOR PROCEDURE:  The patient is a 78-year-old female who presented to the Emergency Department at [facility] earlier today with back and jaw pain.  CT angiography revealed a type A aortic dissection with extensive flap extending all the way to the level of the iliac bifurcation.  There was additionally concern on the CT scan for nonperfusion of the right renal artery as well as nonperfusion of the superior mesenteric artery.  An extensive discussion was had with the patient and the family regarding the expected outcome without treatment as well as the risks and benefits of operative intervention.  Following discussion of risks and benefits, they have elected to proceed.        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  Initial ALEX confirmed dissection flap and moderate AI. The pericardial space was free of any blood.  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  The right femoral vessels were exposed; however, it was not possible to pass a wire up from the right common femoral artery, although this did not appear  to be dissected.  As a result of an inability to pass a wire from the right groin, a left groin cutdown additionally was performed with exposure of the femoral vessels.  The arterial access was initially obtained at the left common femoral artery and bicaval venous cannulation was performed.  An ascending aortic replacement was performed with a 34 mm Hemashield graft with aortic valve resuspension.  Circulatory arrest time was 16 minutes.  Cross-clamp time was 68 minutes.  Cardiopulmonary bypass time was 132 minutes. There was no residual AI on post bypass ALEX.     Following completion of the ascending aortic repair, it became possible for Dr. Jennings to pass a wire from the right common femoral artery, presumably due to pressurization of the true lumen.  He then performed visceral angiography which appeared to reveal adequate perfusion of the superior mesenteric artery.      DESCRIPTION OF PROCEDURE:  The patient was brought to the operating room, placed supine on the OR table.  Following induction of general anesthesia, appropriate monitoring lines were placed.  The patient was then sterilely prepped and draped in the usual fashion.  A timeout was then performed to confirm patient identity, procedure to be performed, and the administration of preoperative antibiotics.      A median sternotomy was performed.  Simultaneously, a separate team member performed a right groin cutdown with exposure of the femoral vessels.      The pericardium was opened and tented on sutures and the patient was systemically heparinized.  Pursestring sutures were placed on the anterior surface of the right common femoral vessels and upon confirmation of adequate ACT, percutaneous access was obtained of the right common femoral artery.  As noted above, it was not possible to pass a wire from this side as there was resistance met after passing the wire in maybe 15-20 cm and as a result, we elected to withdraw the access at that site and  snare down the existing pursestring.  A left groin cutdown was then performed and the femoral vessels exposed on that side.  A pursestring placed on the anterior surface of the common femoral artery and ultimately percutaneous access was obtained here and Seldinger technique utilized for serial dilation and placement of 17-Bulgarian arterial cannula.  This was connected to the cardiopulmonary bypass circuit.      Pursestring sutures were then placed in the SVC and the IVC junction with the atrium.  These sites additionally were cannulated and cardiopulmonary bypass established.  An LV vent was placed via the right superior pulmonary vein and a retrograde cardioplegia catheter inserted into the coronary sinus via the right atrial free wall.      We begin systemically cooling the patient and during this time mobilized the ascending aorta.      As we began cooling the heart fibrillated; however, it remained decompressed.  Additionally, during this time, we did note poor waveform on the right radial A-line.  We were concerned about some pressurization of the false lumen; however, we were able to maintain good urine output and normal cerebral oximetry.      We sized the distal ascending aorta to a 34 mm graft and this was opened.  There unfortunately was not a single sidearm graft so we used a branch graft and ligated the 3 unneeded branches with 4-0 Prolene and large clips.      When the patient reached approximately 24 degrees with the  heart fibrillating, we did cross-clamp the mid ascending aorta. We delivered retrograde cardioplegia with good response and prompt diastolic arrest.  The aorta was transected proximal to the clamp and it did appear that the entry tear was just proximal to the clamp in the proximal ascending aorta.      The abnormal aorta in this region was excised back to the level of the sinotubular junction and the sinotubular junction was then reconstructed with layers of bovine pericardium on the  inside and outside.      At this point, we had reached deep hypothermia at approximately 18 degrees centigrade.  We drained the patient, placed her in Trendelenburg position, clamped the IVC line and turned off the pump.  We did initiate retrograde cerebral perfusion and upon examination of the aortic arch, there were no further entry tears noted.      The ascending aorta was transected back to about 2 cm proximal to the takeoff of the innominate artery and the aortic wall again reconstructed with bovine pericardium on the outside.      Our open distal anastomosis was then created an end-to-end fashion with the aforementioned 34 mm graft with running 4-0 Prolene and bovine pericardial buttress on the aortic wall.  Upon completion of this distal anastomosis, we did utilize the side branch and a tubing connector to reestablish arterial flow and we did clamp out the femoral arterial line.  We deaired the arch and placed a cross-clamp just proximal to the side branch.  The total period of circulatory arrest was 16 minutes.      Attention was then turned to the proximal anastomosis.  The graft was trimmed to fit and the proximal anastomosis was created with running 4-0 Prolene.  Upon completion of this anastomosis, we did deliver retrograde hot shot and placed an aortic root vent on the anterior surface of the tube graft.  Additionally, during this time after we had reestablished antegrade flow down the true lumen.  Dr. Jennings with Vascular Surgery had joined us in the operating room and at this point, with presumed pressurization of the true lumen, he was able to pass a wire up after percutaneously accessing the right common femoral artery.      With the hot shot completed, the cross-clamp was removed.  The total period of cross-clamp was 68 minutes.  Ventricular pacing wire was placed through the anterior surface of the RV muscle; however, the patient was still cold at this point and fibrillated.  We kept the heart  decompressed during this period of time.  We examined our suture lines and they appeared to be grossly hemostatic.      Various de-airing maneuvers were undertaken during this period of time and once the patient reached approximately 30 degrees centigrade, her ventricular fibrillation ceased and she entered a ventricularly paced rhythm, and eventually a sinus rhythm once fully rewarmed.     Rewarming continued during this period of time and after the patient reached 36 degrees centigrade, we were easily able to wean from cardiopulmonary bypass on moderate vasopressor support.  Total period of cardiopulmonary bypass was 132 minutes.      While we were rewarming, we did remove the arterial cannula from the left common femoral artery and cinched down the existing pursestring.  The venous cannulas had been removed after a test dose of protamine and those pursestrings tied down and oversewn.  Additionally, the retrograde catheter had been removed and the existing pursestring tied down and oversewn.  Also, prior to coming off bypass and after we were satisfied with de-airing, the LV vent had been removed and existing pursestring tied down as well as the root vent.      The pump volume was then slowly transfused back to the patient along with blood products and protamine.  The side branch graft was clamped approximately 2 cm off the main portion of the graft and then transected and we then spent several hours obtaining careful hemostasis of the operative field and transfusing the patient with multiple blood products to correct her coagulopathy.      During this time, Dr. Jennings again joined us in the operating room and utilized the right common femoral arterial access to perform visceral angiography.  He stated he was satisfied that there did appear to be adequate flow down the superior mesenteric artery.  Please refer to his separate procedural report for full details of his procedure.      Several additional small repair  sutures were placed along the points of bleeding on our proximal and distal anastomoses and ultimately the side branch graft that had been transected, was then ligated with 4-0 Prolene and clips.      With the chest packed, we did turn our attention back to the groin cut downs.  Hemostasis was ensured in these locations after repairing the common femoral arteries bilaterally.  These wounds were then closed in multiple layers of absorbable suture.  The skin closures were performed with a running subcuticular stitch and reinforced with Dermabond.        With the groin closure complete, attention turned back to the chest.  There appeared to be adequate hemostasis and topical agents were applied along our suture lines.  Two separate chest tubes were placed.  A 32 angled tube behind the heart and a 32 straight tube anterior to the heart.  Additionally, because of the protrusion of the ligated side branches, I did close the pericardium anteriorly just over this region of the graft so that these would not be in direct contact with the posterior table of the sternum.      The sternum was then reapproximated with multiple interrupted single and double stainless steel sternal wires and the presternal tissues closed in multiple layers of absorbable suture.      The skin closure was reinforced with Dermabond and sterile dressings were applied at all sites.      At the end of the procedure, all counts were reported to be correct.  The patient was taken to the surgical intensive care unit in critical but stable condition.         RACHEL EVNAS MD             D: 2019   T: 2019   MT: CATRACHO      Name:     CEM MORRIS   MRN:      [id number]        Account:        [id number]   :      1940           Procedure Date: 2019      Document: [id number]

## 2019-01-05 NOTE — PROGRESS NOTES
[facility]   CRITICAL CARE     Priya Funez MRN: [id number]  1940  Date of Admission:1/4/2019          HPI   Priya Funez IS a 78 year old female admitted on 1/4/2019 with significant history for HTN who presented with sudden onset of back pain found to have type A aortic dissection and sent for emergency repair.     She is POD#1 for aortic dissection repair with graft. Bypass time was 114min. Net I/O was 3.9L. She was admitted to ICU for ongoing care.    1/5: did well overnight.  Epi weaned off this morning; continues on low dose phenylephrine.  Hgb stable.           Past Medical History:      Past Medical History:   Diagnosis Date     Hypertension              Past Surgical History:    No past surgical history on file.         Social History:     Social History     Tobacco Use     Smoking status: Never Smoker     Smokeless tobacco: Never Used     Tobacco comment: hisband   Substance Use Topics     Alcohol use: No     Frequency: Never            Family History:   No family history on file.          Allergies:   Please see allergy list which was reviewed this admission.         Medications:       acetaminophen  975 mg Oral Q8H     calcium gluconate  1 g Intravenous Once     ceFAZolin  1 g Intravenous Q8H     gabapentin  100 mg Oral TID     metoprolol tartrate  25 mg Oral Q12H    Or     metoprolol  25 mg Per NG tube Q12H     mupirocin  0.5 g Both Nostrils BID     pantoprazole (PROTONIX) IV  40 mg Intravenous Daily     sodium chloride (PF)  3 mL Intracatheter Q8H     vancomycin (VANCOCIN) IV  1,000 mg Intravenous Q12H     [START ON 1/7/2019] acetaminophen, IV fluid REPLACEMENT ONLY, glucose **OR** dextrose **OR** glucagon, fentaNYL, hydrALAZINE, lidocaine 4%, lidocaine (buffered or not buffered), magnesium sulfate, magnesium sulfate, meperidine, methocarbamol, naloxone, ondansetron **OR** ondansetron, oxyCODONE IR, potassium chloride, potassium chloride with lidocaine, potassium chloride,  potassium chloride, potassium chloride, sodium chloride (PF), sodium phosphate, sodium phosphate, sodium phosphate, sodium phosphate         Review of Systems:   Unable to assess due to critical illness         Physical Examination:   Temp:  [93.7  F (34.3  C)-100.2  F (37.9  C)] 100.2  F (37.9  C)  Pulse:  [63-87] 81  Heart Rate:  [66-97] 93  Resp:  [9-22] 22  BP: ()/() 106/59  MAP:  [60 mmHg-83 mmHg] 69 mmHg  Arterial Line BP: ()/(47-62) 97/53  FiO2 (%):  [50 %-60 %] 50 %  SpO2:  [86 %-100 %] 99 %    Intake/Output Summary (Last 24 hours) at 1/4/2019 2311  Last data filed at 1/4/2019 2211  Gross per 24 hour   Intake 6301 ml   Output 2636 ml   Net 3665 ml     Wt Readings from Last 4 Encounters:   01/05/19 91.6 kg (201 lb 15.1 oz)   09/14/08 80.7 kg (178 lb)     Arterial Line BP: ()/(47-62) 97/53  MAP:  [60 mmHg-83 mmHg] 69 mmHg  BP - Mean:  [] 68  CVP:  [7 mmHg-12 mmHg] 9 mmHg  SVO2:  [63 %-69 %] 69 %  Ventilation Mode: CMV/AC  (Continuous Mandatory Ventilation/ Assist Control)  FiO2 (%): 50 %  Rate Set (breaths/minute): 12 breaths/min  Tidal Volume Set (mL): 450 mL  PEEP (cm H2O): 5 cmH2O  Oxygen Concentration (%): 60 %  Resp: 22    Recent Labs   Lab 01/05/19  0500 01/04/19  2255 01/04/19  1948 01/04/19  1921   PH 7.41 7.32* 7.39 7.39   PCO2 37 46* 38 41   PO2 192* 150* 340* 420*   HCO3 24 24 23 25       GEN: sedated, intubated  HEENT: intubated. RIJ sheath with central line and PA catheter  PULM: unlabored synchronous with vent, clear anteriorly    CV/COR: RRR S1S2 no gallop,  No rub, no murmur. Incision c/d/i. 2 chest tubes on suction.  ABD: incision c/d/i  EXT:  Edema   warm  NEURO: grossly intact.  Awakens to voice and nods appropriately.  SKIN: no obvious rash      Assessment and plan :     Neurology/Psychiatry/Pain/Sedation:   1. Sedation/Analgesia  -On propofol; wean sedation  -PRN analgesia with fentanyl   -Scheduled gabapentin and tylenol    Cardiovascular/Hemodynamics    1.Type A aortic dissection repair 1/4. -Hemodynamically; off epi and weaning phenylephrine  -Cont to wean as tolerated per MAP goals    -Metoprolol 25 mg q12  -chest tube in place  -Further recs per CTS    Pulmonary/Ventilator Management:   1. Acute respiratory failure 2/2 above. Easy to oxygenate and ventilate. Mechanics adequate. CXR reviewed with good positioning of lines and tubes.  -Continue ventilator support  -SBT today with goal of extubation    Renal/Fluids/Electrolytes   -Good UOP   -Crt increased to 1.04; continue to monitor  -electrolyte replacement per unit protocol      GI/Nutrition:   1. NPO for now. PPI for ppx.     Endocrine/Hematology/Oncology:   1. ICU glucose protocol  2. Hgb stable at 8.2; continue to monitor  3. Thrombocytopenia -- likely reactive, continue to monitor  -Received 1U pRBC per CTS    Disposition/Code Status/Other  1. Critically ill 2/2 type-A dissection and now post-repair.   2. Code: Full   3. Family updated at bedside this morning.    ICU Prophylaxis:   1. DVT: mechanical  2. VAP: HOB 30 degrees, chlorhexidine rinse  3. Stress Ulcer: PPI  4. Restraints: Nonviolent soft two point restraints required and necessary for patient safety and continued cares and good effect as patient continues to pull at necessary lines, tubes despite education and distraction. Will readdress daily.   5. IV Access - central access required and necessary for continued patient cares  6. Feeding - NPO    I have personally reviewed the daily labs, imaging studies, cultures and discussed the case with referring physician and consulting physicians.     This patient is critically ill and I have provided 35 minutes of critical care time (excluding procedures)    Angella Ashraf MD  Pulmonary and Critical Care Medicine            Data:   All data and imaging reviewed     ROUTINE ICU LABS (Last four results)  CMP  Recent Labs   Lab 01/05/19  0500 01/04/19  2255 01/04/19 2012 01/04/19  1948  01/04/19  1400     144 145* 142   < > 137   POTASSIUM 4.6 3.1* 3.6 4.2   < > 3.4   CHLORIDE 109 107 109  --   --  105   CO2 23 24 21  --   --  22   ANIONGAP 11 13 15*  --   --  10   * 226* 214*  --   --  125*   BUN 25 20 17  --   --  17   CR 1.04 0.83 0.75  --   --  0.80   GFRESTIMATED 52* 69 77  --   --  72   GFRESTBLACK 60* 80 89  --   --  83   BESS 7.4* 7.2* 9.2  --   --  8.8   MAG 2.3 2.5*  --   --   --   --    PHOS 4.1 4.5  --   --   --   --    PROTTOTAL 5.2* 4.8*  --   --   --  6.8   ALBUMIN 3.4 2.6*  --   --   --  3.6   BILITOTAL 1.5* 1.1  --   --   --  1.3   ALKPHOS 43 54  --   --   --  92   * 147*  --   --   --  27   ALT 75* 65*  --   --   --  24    < > = values in this interval not displayed.     CBC  Recent Labs   Lab 01/05/19  0500 01/04/19 2255 01/04/19 2012 01/04/19  1948  01/04/19  1400   WBC 8.8 11.9* 15.1*  --   --  17.7*   RBC 2.90* 3.32* 2.24*  --   --  4.40   HGB 8.2* 9.6* 6.9* 7.5*   < > 13.2   HCT 24.9* 28.6* 19.7*  --   --  39.7   MCV 86 86 88  --   --  90   MCH 28.3 28.9 30.8  --   --  30.0   MCHC 32.9 33.6 35.0  --   --  33.2   RDW 14.8 13.8 13.3  --   --  13.1   * 122* 134*  --   --  227    < > = values in this interval not displayed.     INR  Recent Labs   Lab 01/04/19 2255 01/04/19 2012   INR 1.35* 2.03*     Arterial Blood Gas  Recent Labs   Lab 01/05/19  0500 01/04/19  2255 01/04/19  1948 01/04/19  1921   PH 7.41 7.32* 7.39 7.39   PCO2 37 46* 38 41   PO2 192* 150* 340* 420*   HCO3 24 24 23 25       All cultures:  No results for input(s): CULT in the last 168 hours.  Recent Results (from the past 24 hour(s))   Chest XR,  PA & LAT    Narrative    CHEST TWO VIEW   1/4/2019 2:23 PM     HISTORY: Shortness of breath, chest pain.    COMPARISON: None.      Impression    IMPRESSION: PA and lateral views of the chest. Patient is slightly  rotated to the right on the frontal view. Lungs are clear. Heart is  normal in size. No effusions are evident. No pneumothorax. Hiatal  hernia with  air-fluid level projects through the cardiac silhouette.    KENZIE MARTE MD   CT Aortic Survey w Contrast   Result Value    Radiologist flags Aortic dissection (AA)    Narrative    CT AORTIC SURVEY WITH CONTRAST 1/4/2019 2:47 PM     HISTORY: See the Clinical Information for Interpreting Provider; jaw  pain, near syncope, abdominal pain, burping, evaluate for aortic  dissection.    TECHNIQUE: Initial noncontrast imaging is performed through the chest.  Additional thin section axial images from the thoracic inlet to the  symphysis are performed with additional coronal and sagittal  reformatted images. 80 mL of Isovue 370 are given intravenously.   Radiation dose for this scan was reduced using automated exposure  control, adjustment of the mA and/or kV according to patient size, or  iterative reconstruction technique.    FINDINGS:   Chest: Calcified granuloma medial right upper lobe is present on  series 6, image 18. Subpleural nodular density left lower lobe  measures 0.4 cm on image 58. Lungs are otherwise clear. No pleural or  pericardial fluid. Heart is normal in size. No enlarged lymph nodes.  Thyroid gland is normal in size. Central pulmonary arteries are  patent.    Ascending thoracic aorta is aneurysmal on initial noncontrast images  measuring up to 5.6 x 5.4 cm on series 4, image 30. Aortic dissection  is present starting in the ascending thoracic aorta and extends  through the aortic arch and descending thoracic aorta terminating in  the region of the abdominal aortic bifurcation into the right and left  common iliac arteries. Dissection flap also extends over a short  segment into the right brachiocephalic artery and left subclavian  artery. Flow in the left subclavian artery near the thoracic inlet on  series 5, image 17 markedly narrows compared to the contralateral  right subclavian artery. This may be related to the dissection.  Correlate for any changes of ischemia in left upper extremity. A  larger and  smaller lumen is present and contrast opacification in each  lumen appears fairly symmetric in the thoracic aorta. The celiac axis  origin and right renal artery origin appear to arise off the smaller  of the two lumens and there is marked decreased or absent enhancement  of the right kidney probably representing arterial occlusion. Celiac  axis appears well perfused. SMA appears to arise off the larger lumen  and after a short segment of patency is occluded. Loops of small bowel  in the abdomen demonstrate wall thickening probably related to  ischemia. Left renal artery arises off the larger lumen and the left  kidney appears perfused. The JARAD also appears to arise off the larger  lumen but is difficult to confirm but is patent. Both the right and  left iliac arteries are patent and appear well-perfused.    Abdomen: The liver with small left hepatic lobe cyst or hemangioma is  noted on series 5, image 90 and is unremarkable. The spleen,  gallbladder, pancreas and adrenal glands are within normal limits.  Right kidney again is not perfused. Nonobstructing renal collecting  system stones are noted in the right kidney. No hydronephrosis. Left  kidney appears perfused and no renal calculi are evident. No  hydronephrosis. Small bowel again demonstrates mild wall thickening  likely resulting from SMA occlusion and resulting ischemia. No  significant mesenteric stranding is currently evident. No  diverticulitis, obstruction or appendicitis.    Pelvis: The bladder, uterus and rectum are unremarkable. No enlarged  pelvic lymph nodes or free fluid. Bone window examination demonstrates  degenerative spine changes. No aggressive appearing bone lesions are  noted.      Impression    IMPRESSION:  1. Extensive aortic dissection from the ascending thoracic aorta  through the proximal aortic bifurcation in the abdomen. 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 Correlate clinically for any  changes of ischemia involving the left upper extremity.  2. No other acute changes are evident in the chest, abdomen or pelvis.    [Critical Result: Aortic dissection]    Finding was identified on 1/4/2019 3:19 PM.     Dr. Alicia was contacted by me on 1/4/2019 3:31 PM and verbalized  understanding of the critical result.     KENZIE MARTE MD   XR Surgery LANDRY L/T 5 Min Fluoro w Stills    Narrative    This exam was marked as non-reportable because it will not be read by a   radiologist or a [place] non-radiologist provider.

## 2019-01-05 NOTE — ANESTHESIA CARE TRANSFER NOTE
Patient: Priya Funez    Procedure(s):  AORTIC DISSECTION REPAIR WITH GRAFT- HEMASHIELD PLATINUM WOVEN DOUBLE VELOR 4 SIDE ARM VASCULAR GRAFT, D:35 X 12 X 10 X 10MM/ L:50CM  DIAGONSTIC ANGIOGRAM OF SMA    Diagnosis: AORTIC DISSECTION  Diagnosis Additional Information: No value filed.    Anesthesia Type:   No value filed.     Note:  Airway :ETT  Patient transferred to:ICU  Comments: Pt to ICU, ett in place, ambu with 10L O2 for transport, placed on vent in ICU  Vt - 450  Rate - 12  PEEP - 5  Patent IV  All monitors and alarms on.  VSS  Report and transfer of care to RN.ICU Handoff: Call for PAUSE to initiate/utilize ICU HANDOFF, Identified Patient, Identified Responsible Provider, Reviewed the Pertinent Medical History, Discussed Surgical Course, Reviewed Intra-OP Anesthesia Management and Issues during Anesthesia, Set Expectations for Post Procedure Period and Allowed Opportunity for Questions and Acknowledgement of Understanding      Vitals: (Last set prior to Anesthesia Care Transfer)    CRNA VITALS  1/4/2019 2207 - 1/4/2019 2307      1/4/2019             Resp Rate (observed):  8    Resp Rate (set):  10                Electronically Signed By: TAMRA Valerio CRNA  January 4, 2019  11:08 PM

## 2019-01-05 NOTE — PHARMACY-ADMISSION MEDICATION HISTORY
Admission medication history interview status for the 1/4/2019  admission is complete. See EPIC admission navigator for prior to admission medications     Medication history source reliability:Good    Actions taken by pharmacist (provider contacted, etc):  PTA list verified with patient and spouse     Additional medication history information not noted on PTA med list :None    Medication reconciliation/reorder completed by provider prior to medication history? No    Time spent in this activity: 10 minutes    Prior to Admission medications    Medication Sig Last Dose Taking? Auth Provider   aspirin (ASA) 81 MG EC tablet Take 81 mg by mouth every 24 hours 1/4/2019 Yes Reported, Patient   cholecalciferol (VITAMIN D-1000 MAX ST) 1000 units TABS Take 1,000 Units by mouth daily  1/4/2019 Yes Reported, Patient   triamterene-HCTZ (MAXZIDE-25) 37.5-25 MG tablet Take 0.5 tablets by mouth daily  1/4/2019 Yes Reported, Patient     Tiana Vasquez, Pharm.D IV Student

## 2019-01-06 ENCOUNTER — APPOINTMENT (OUTPATIENT)
Dept: PHYSICAL THERAPY | Facility: CLINIC | Age: 79
DRG: 003 | End: 2019-01-06
Payer: COMMERCIAL

## 2019-01-06 ENCOUNTER — APPOINTMENT (OUTPATIENT)
Dept: GENERAL RADIOLOGY | Facility: CLINIC | Age: 79
DRG: 003 | End: 2019-01-06
Payer: COMMERCIAL

## 2019-01-06 ENCOUNTER — APPOINTMENT (OUTPATIENT)
Dept: ULTRASOUND IMAGING | Facility: CLINIC | Age: 79
DRG: 003 | End: 2019-01-06
Payer: COMMERCIAL

## 2019-01-06 LAB
ALBUMIN UR-MCNC: 30 MG/DL
ANION GAP SERPL CALCULATED.3IONS-SCNC: 10 MMOL/L (ref 3–14)
ANION GAP SERPL CALCULATED.3IONS-SCNC: 11 MMOL/L (ref 3–14)
APPEARANCE UR: ABNORMAL
BASE DEFICIT BLDA-SCNC: 3.3 MMOL/L
BASE DEFICIT BLDA-SCNC: 3.3 MMOL/L
BASE DEFICIT BLDA-SCNC: 3.9 MMOL/L
BILIRUB UR QL STRIP: NEGATIVE
BUN SERPL-MCNC: 39 MG/DL (ref 7–30)
BUN SERPL-MCNC: 44 MG/DL (ref 7–30)
CALCIUM SERPL-MCNC: 7.3 MG/DL (ref 8.5–10.1)
CALCIUM SERPL-MCNC: 7.5 MG/DL (ref 8.5–10.1)
CHLORIDE SERPL-SCNC: 111 MMOL/L (ref 94–109)
CHLORIDE SERPL-SCNC: 114 MMOL/L (ref 94–109)
CO2 SERPL-SCNC: 21 MMOL/L (ref 20–32)
CO2 SERPL-SCNC: 21 MMOL/L (ref 20–32)
COLOR UR AUTO: YELLOW
CREAT SERPL-MCNC: 1.71 MG/DL (ref 0.52–1.04)
CREAT SERPL-MCNC: 1.72 MG/DL (ref 0.52–1.04)
CREAT UR-MCNC: 111 MG/DL
ERYTHROCYTE [DISTWIDTH] IN BLOOD BY AUTOMATED COUNT: 17 % (ref 10–15)
FRACT EXCRET NA UR+SERPL-RTO: <0.1 %
GFR SERPL CREATININE-BSD FRML MDRD: 28 ML/MIN/{1.73_M2}
GFR SERPL CREATININE-BSD FRML MDRD: 29 ML/MIN/{1.73_M2}
GLUCOSE BLDC GLUCOMTR-MCNC: 118 MG/DL (ref 70–99)
GLUCOSE BLDC GLUCOMTR-MCNC: 126 MG/DL (ref 70–99)
GLUCOSE SERPL-MCNC: 129 MG/DL (ref 70–99)
GLUCOSE SERPL-MCNC: 130 MG/DL (ref 70–99)
GLUCOSE UR STRIP-MCNC: NEGATIVE MG/DL
HCO3 BLD-SCNC: 20 MMOL/L (ref 21–28)
HCO3 BLD-SCNC: 20 MMOL/L (ref 21–28)
HCO3 BLD-SCNC: 21 MMOL/L (ref 21–28)
HCT VFR BLD AUTO: 26.6 % (ref 35–47)
HGB BLD-MCNC: 9.1 G/DL (ref 11.7–15.7)
HGB UR QL STRIP: ABNORMAL
HYALINE CASTS #/AREA URNS LPF: 3 /LPF (ref 0–2)
INR PPP: 1.46 (ref 0.86–1.14)
KETONES UR STRIP-MCNC: NEGATIVE MG/DL
LACTATE BLD-SCNC: 1.2 MMOL/L (ref 0.7–2)
LEUKOCYTE ESTERASE UR QL STRIP: NEGATIVE
MAGNESIUM SERPL-MCNC: 2.2 MG/DL (ref 1.6–2.3)
MCH RBC QN AUTO: 28.5 PG (ref 26.5–33)
MCHC RBC AUTO-ENTMCNC: 34.2 G/DL (ref 31.5–36.5)
MCV RBC AUTO: 83 FL (ref 78–100)
MRSA DNA SPEC QL NAA+PROBE: NEGATIVE
MUCOUS THREADS #/AREA URNS LPF: PRESENT /LPF
NITRATE UR QL: NEGATIVE
OXYHGB MFR BLD: 92 % (ref 92–100)
OXYHGB MFR BLD: 94 % (ref 92–100)
OXYHGB MFR BLD: 96 % (ref 92–100)
PCO2 BLD: 29 MM HG (ref 35–45)
PCO2 BLD: 31 MM HG (ref 35–45)
PCO2 BLD: 36 MM HG (ref 35–45)
PH BLD: 7.39 PH (ref 7.35–7.45)
PH BLD: 7.41 PH (ref 7.35–7.45)
PH BLD: 7.45 PH (ref 7.35–7.45)
PH UR STRIP: 5.5 PH (ref 5–7)
PLATELET # BLD AUTO: 74 10E9/L (ref 150–450)
PO2 BLD: 60 MM HG (ref 80–105)
PO2 BLD: 66 MM HG (ref 80–105)
PO2 BLD: 89 MM HG (ref 80–105)
POTASSIUM SERPL-SCNC: 3.5 MMOL/L (ref 3.4–5.3)
POTASSIUM SERPL-SCNC: 4 MMOL/L (ref 3.4–5.3)
RBC # BLD AUTO: 3.19 10E12/L (ref 3.8–5.2)
RBC #/AREA URNS AUTO: 2 /HPF (ref 0–2)
SODIUM SERPL-SCNC: 143 MMOL/L (ref 133–144)
SODIUM SERPL-SCNC: 145 MMOL/L (ref 133–144)
SODIUM UR-SCNC: <5 MMOL/L
SOURCE: ABNORMAL
SP GR UR STRIP: 1.02 (ref 1–1.03)
SPECIMEN SOURCE: NORMAL
UROBILINOGEN UR STRIP-MCNC: NORMAL MG/DL (ref 0–2)
VANCOMYCIN SERPL-MCNC: 12.1 MG/L
WBC # BLD AUTO: 10.3 10E9/L (ref 4–11)
WBC #/AREA URNS AUTO: 4 /HPF (ref 0–5)

## 2019-01-06 PROCEDURE — 82805 BLOOD GASES W/O2 SATURATION: CPT | Performed by: STUDENT IN AN ORGANIZED HEALTH CARE EDUCATION/TRAINING PROGRAM

## 2019-01-06 PROCEDURE — 82570 ASSAY OF URINE CREATININE: CPT | Performed by: INTERNAL MEDICINE

## 2019-01-06 PROCEDURE — 99233 SBSQ HOSP IP/OBS HIGH 50: CPT | Performed by: INTERNAL MEDICINE

## 2019-01-06 PROCEDURE — 85027 COMPLETE CBC AUTOMATED: CPT | Performed by: STUDENT IN AN ORGANIZED HEALTH CARE EDUCATION/TRAINING PROGRAM

## 2019-01-06 PROCEDURE — 93005 ELECTROCARDIOGRAM TRACING: CPT

## 2019-01-06 PROCEDURE — 20000003 ZZH R&B ICU

## 2019-01-06 PROCEDURE — 93975 VASCULAR STUDY: CPT | Mod: TC

## 2019-01-06 PROCEDURE — C9113 INJ PANTOPRAZOLE SODIUM, VIA: HCPCS | Performed by: STUDENT IN AN ORGANIZED HEALTH CARE EDUCATION/TRAINING PROGRAM

## 2019-01-06 PROCEDURE — 25000128 H RX IP 250 OP 636: Performed by: INTERNAL MEDICINE

## 2019-01-06 PROCEDURE — 25000132 ZZH RX MED GY IP 250 OP 250 PS 637: Performed by: STUDENT IN AN ORGANIZED HEALTH CARE EDUCATION/TRAINING PROGRAM

## 2019-01-06 PROCEDURE — 87640 STAPH A DNA AMP PROBE: CPT | Performed by: STUDENT IN AN ORGANIZED HEALTH CARE EDUCATION/TRAINING PROGRAM

## 2019-01-06 PROCEDURE — 25000125 ZZHC RX 250: Performed by: STUDENT IN AN ORGANIZED HEALTH CARE EDUCATION/TRAINING PROGRAM

## 2019-01-06 PROCEDURE — 82805 BLOOD GASES W/O2 SATURATION: CPT | Performed by: INTERNAL MEDICINE

## 2019-01-06 PROCEDURE — 97530 THERAPEUTIC ACTIVITIES: CPT | Mod: GP | Performed by: PHYSICAL THERAPIST

## 2019-01-06 PROCEDURE — 40000193 ZZH STATISTIC PT WARD VISIT: Performed by: PHYSICAL THERAPIST

## 2019-01-06 PROCEDURE — 00000146 ZZHCL STATISTIC GLUCOSE BY METER IP

## 2019-01-06 PROCEDURE — 83735 ASSAY OF MAGNESIUM: CPT | Performed by: STUDENT IN AN ORGANIZED HEALTH CARE EDUCATION/TRAINING PROGRAM

## 2019-01-06 PROCEDURE — 40000275 ZZH STATISTIC RCP TIME EA 10 MIN

## 2019-01-06 PROCEDURE — 97161 PT EVAL LOW COMPLEX 20 MIN: CPT | Mod: GP | Performed by: PHYSICAL THERAPIST

## 2019-01-06 PROCEDURE — 83605 ASSAY OF LACTIC ACID: CPT | Performed by: STUDENT IN AN ORGANIZED HEALTH CARE EDUCATION/TRAINING PROGRAM

## 2019-01-06 PROCEDURE — 84300 ASSAY OF URINE SODIUM: CPT | Performed by: INTERNAL MEDICINE

## 2019-01-06 PROCEDURE — 87040 BLOOD CULTURE FOR BACTERIA: CPT | Performed by: INTERNAL MEDICINE

## 2019-01-06 PROCEDURE — 71045 X-RAY EXAM CHEST 1 VIEW: CPT

## 2019-01-06 PROCEDURE — P9047 ALBUMIN (HUMAN), 25%, 50ML: HCPCS | Performed by: INTERNAL MEDICINE

## 2019-01-06 PROCEDURE — 80048 BASIC METABOLIC PNL TOTAL CA: CPT | Performed by: INTERNAL MEDICINE

## 2019-01-06 PROCEDURE — 80202 ASSAY OF VANCOMYCIN: CPT | Performed by: INTERNAL MEDICINE

## 2019-01-06 PROCEDURE — 85610 PROTHROMBIN TIME: CPT | Performed by: STUDENT IN AN ORGANIZED HEALTH CARE EDUCATION/TRAINING PROGRAM

## 2019-01-06 PROCEDURE — 81001 URINALYSIS AUTO W/SCOPE: CPT | Performed by: INTERNAL MEDICINE

## 2019-01-06 PROCEDURE — 93010 ELECTROCARDIOGRAM REPORT: CPT | Performed by: INTERNAL MEDICINE

## 2019-01-06 PROCEDURE — 80048 BASIC METABOLIC PNL TOTAL CA: CPT | Performed by: STUDENT IN AN ORGANIZED HEALTH CARE EDUCATION/TRAINING PROGRAM

## 2019-01-06 PROCEDURE — 27210338 ZZH CIRCUIT HUMID FACE/TRACH MSK

## 2019-01-06 PROCEDURE — 87641 MR-STAPH DNA AMP PROBE: CPT | Performed by: STUDENT IN AN ORGANIZED HEALTH CARE EDUCATION/TRAINING PROGRAM

## 2019-01-06 PROCEDURE — 94660 CPAP INITIATION&MGMT: CPT

## 2019-01-06 PROCEDURE — 25000128 H RX IP 250 OP 636: Performed by: THORACIC SURGERY (CARDIOTHORACIC VASCULAR SURGERY)

## 2019-01-06 PROCEDURE — 25000128 H RX IP 250 OP 636: Performed by: STUDENT IN AN ORGANIZED HEALTH CARE EDUCATION/TRAINING PROGRAM

## 2019-01-06 RX ORDER — SODIUM CHLORIDE 9 MG/ML
INJECTION, SOLUTION INTRAVENOUS CONTINUOUS
Status: DISCONTINUED | OUTPATIENT
Start: 2019-01-06 | End: 2019-01-07

## 2019-01-06 RX ORDER — FUROSEMIDE 10 MG/ML
20 INJECTION INTRAMUSCULAR; INTRAVENOUS ONCE
Status: COMPLETED | OUTPATIENT
Start: 2019-01-06 | End: 2019-01-06

## 2019-01-06 RX ORDER — METOPROLOL TARTRATE 1 MG/ML
5 INJECTION, SOLUTION INTRAVENOUS EVERY 6 HOURS
Status: DISCONTINUED | OUTPATIENT
Start: 2019-01-06 | End: 2019-01-07

## 2019-01-06 RX ORDER — VANCOMYCIN HYDROCHLORIDE 1 G/200ML
1000 INJECTION, SOLUTION INTRAVENOUS ONCE
Status: COMPLETED | OUTPATIENT
Start: 2019-01-06 | End: 2019-01-06

## 2019-01-06 RX ORDER — PIPERACILLIN SODIUM, TAZOBACTAM SODIUM 3; .375 G/15ML; G/15ML
3.38 INJECTION, POWDER, LYOPHILIZED, FOR SOLUTION INTRAVENOUS EVERY 6 HOURS
Status: DISCONTINUED | OUTPATIENT
Start: 2019-01-06 | End: 2019-01-06

## 2019-01-06 RX ORDER — ALBUMIN (HUMAN) 12.5 G/50ML
12.5 SOLUTION INTRAVENOUS EVERY 8 HOURS
Status: COMPLETED | OUTPATIENT
Start: 2019-01-06 | End: 2019-01-07

## 2019-01-06 RX ORDER — METOPROLOL TARTRATE 1 MG/ML
INJECTION, SOLUTION INTRAVENOUS
Status: DISCONTINUED
Start: 2019-01-06 | End: 2019-01-07 | Stop reason: HOSPADM

## 2019-01-06 RX ADMIN — HYDRALAZINE HYDROCHLORIDE 10 MG: 20 INJECTION INTRAMUSCULAR; INTRAVENOUS at 16:32

## 2019-01-06 RX ADMIN — SODIUM CHLORIDE: 9 INJECTION, SOLUTION INTRAVENOUS at 08:32

## 2019-01-06 RX ADMIN — MUPIROCIN 0.5 G: 20 OINTMENT TOPICAL at 08:10

## 2019-01-06 RX ADMIN — FUROSEMIDE 20 MG: 10 INJECTION, SOLUTION INTRAVENOUS at 03:03

## 2019-01-06 RX ADMIN — NITROGLYCERIN 0.9 MCG/KG/MIN: 20 INJECTION INTRAVENOUS at 20:44

## 2019-01-06 RX ADMIN — FENTANYL CITRATE 50 MCG: 50 INJECTION, SOLUTION INTRAMUSCULAR; INTRAVENOUS at 00:39

## 2019-01-06 RX ADMIN — ACETAMINOPHEN 650 MG: 325 TABLET, FILM COATED ORAL at 18:12

## 2019-01-06 RX ADMIN — ALBUMIN HUMAN 12.5 G: 0.25 SOLUTION INTRAVENOUS at 11:14

## 2019-01-06 RX ADMIN — FENTANYL CITRATE 25 MCG: 50 INJECTION, SOLUTION INTRAMUSCULAR; INTRAVENOUS at 14:08

## 2019-01-06 RX ADMIN — FENTANYL CITRATE 25 MCG: 50 INJECTION, SOLUTION INTRAMUSCULAR; INTRAVENOUS at 11:10

## 2019-01-06 RX ADMIN — POTASSIUM CHLORIDE 20 MEQ: 400 INJECTION, SOLUTION INTRAVENOUS at 08:32

## 2019-01-06 RX ADMIN — CEFEPIME HYDROCHLORIDE 2 G: 2 INJECTION, POWDER, FOR SOLUTION INTRAVENOUS at 21:36

## 2019-01-06 RX ADMIN — ONDANSETRON 4 MG: 2 INJECTION INTRAMUSCULAR; INTRAVENOUS at 02:01

## 2019-01-06 RX ADMIN — PANTOPRAZOLE SODIUM 40 MG: 40 INJECTION, POWDER, FOR SOLUTION INTRAVENOUS at 08:29

## 2019-01-06 RX ADMIN — VANCOMYCIN HYDROCHLORIDE 1000 MG: 1 INJECTION, SOLUTION INTRAVENOUS at 22:19

## 2019-01-06 RX ADMIN — FENTANYL CITRATE 25 MCG: 50 INJECTION, SOLUTION INTRAMUSCULAR; INTRAVENOUS at 16:02

## 2019-01-06 RX ADMIN — METOPROLOL TARTRATE 5 MG: 5 INJECTION, SOLUTION INTRAVENOUS at 12:24

## 2019-01-06 RX ADMIN — ALBUMIN HUMAN 12.5 G: 0.25 SOLUTION INTRAVENOUS at 18:13

## 2019-01-06 RX ADMIN — SODIUM CHLORIDE: 9 INJECTION, SOLUTION INTRAVENOUS at 11:04

## 2019-01-06 ASSESSMENT — ACTIVITIES OF DAILY LIVING (ADL)
ADLS_ACUITY_SCORE: 17
ADLS_ACUITY_SCORE: 15
ADLS_ACUITY_SCORE: 15
ADLS_ACUITY_SCORE: 17
ADLS_ACUITY_SCORE: 17
ADLS_ACUITY_SCORE: 15

## 2019-01-06 ASSESSMENT — MIFFLIN-ST. JEOR: SCORE: 1320

## 2019-01-06 NOTE — PROVIDER NOTIFICATION
Dr. Barnes notified of increased O2 requirement on bipap, ABG results, and overall status. Dr. Barnes to bedside to perform bedside ultrasound. Dr. Barnes found the patient to signs of fluid overload, small pleural effusions, and atelectasis with bedside ultrasound.    0240:   Dr. Wharton updated on patient respiratory status, hemodynamics, urine output, and Dr. Barnes's findings. 20mg IV lasix ordered.

## 2019-01-06 NOTE — PROVIDER NOTIFICATION
O2 sats low 90's, shallow respirations, very poor IS effort with RN and RT. Orders received for bipap if needed.

## 2019-01-06 NOTE — PLAN OF CARE
Discharge Planner PT   Patient plan for discharge: None Stated.  Current status: CR Evaluation completed, treatment initiated. 77 yo female, previously independent and active, adm via ED found to have a type A aortic dissection. POD#2 Type A aortic dissection.   Resides in a house with stairs to enter and stairs within. She lives with her  of 55 years.  Edu pt on sternal precautions and mobility progression. Rolled to the R with Mod A x1, side>sit Mod A x 2. In sitting Mod A to scoot and achieve midline/balance. Pt with tendency to drift L ? If this is the bed or pt preference. Partial stand x 1 with Mod A x 2, pt unable to fully stand upright, significant use of the bed behind the LEs, unable to step. Use of sling to return to bed. Upright in chair position in bed upon PT exit.   Barriers to return to prior living situation: Not at baseline.  Recommendations for discharge: Acute Rehab  Rationale for recommendations: Pt would benefit from an acute rehab setting at discharge as she is very motivated, was independent and active prior to admit. She has a supportive family. Pt will benefit from ongoing rehab services to progress independence and safety with functional mobility.

## 2019-01-06 NOTE — PROVIDER NOTIFICATION
Dr. Barens notified of insulin drip being off for 6 hours and . Orders received for sliding scale [id number] and discontinue insulin drip.

## 2019-01-06 NOTE — PROGRESS NOTES
01/06/19 1308   Quick Adds   Type of Visit Initial PT Evaluation   Living Environment   Lives With spouse   Living Arrangements house   Home Accessibility stairs within home;stairs to enter home   Self-Care   Usual Activity Tolerance good   Current Activity Tolerance fair   Equipment Currently Used at Home none   Functional Level Prior   Ambulation 0-->independent   Transferring 0-->independent   Toileting 0-->independent   Bathing 0-->independent   Fall history within last six months no   Which of the above functional risks had a recent onset or change? ambulation;transferring;toileting;bathing;dressing;eating;swallowing   Prior Functional Level Comment Independent and active at baseline, walks as able for exercise   General Information   Onset of Illness/Injury or Date of Surgery - Date 01/04/19   Referring Physician Bobby Wharton MD   Patient/Family Goals Statement None stated.   Pertinent History of Current Problem (include personal factors and/or comorbidities that impact the POC) 79 yo female, previously independent and active, adm via ED found to have a type A aortic dissection. POD#2 Type A aortic dissection.    Precautions/Limitations fall precautions;sternal precautions  (Systolic <110)   General Info Comments Multiple lines, swan, pacer wires, IVs, CT, coello on BiPap at time of evaluation, R radial art line   Cognitive Status Examination   Orientation orientation to person, place and time   Level of Consciousness alert   Follows Commands and Answers Questions 100% of the time;able to follow multistep instructions   Personal Safety and Judgment intact   Integumentary/Edema   Integumentary/Edema Comments Slight edema in the feet and ankles noted.   Posture    Posture Forward head position;Protracted shoulders;Kyphosis   Range of Motion (ROM)   ROM Comment B LEs WFL as obs via AROM, B UE WFL within sternal prec   Strength   Strength Comments Demonstrates limited antigravity strength secondary to fatigue,  "impaired activity toelrance and \"my stomach hurts.\"   Bed Mobility   Bed Mobility Comments Sup>sit Mod A x 2   Transfer Skills   Transfer Comments Sit>stand Mod A x 2 to achieve a partial stand.   Gait   Gait Comments Uanble to initate   Balance   Balance Comments Sitting balance with preference to the L    Sensory Examination   Sensory Perception Comments B LEs intact to light touch with gross assessment   General Therapy Interventions   Planned Therapy Interventions balance training;bed mobility training;gait training;neuromuscular re-education;ROM;strengthening;stretching;transfer training;risk factor education;progressive activity/exercise   Clinical Impression   Criteria for Skilled Therapeutic Intervention yes, treatment indicated   PT Diagnosis Unable to Ambulated   Influenced by the following impairments WEakness, fatigue, sternal precautions, BP limitation, fatigue, impaired activity toelrance decreased blance   Functional limitations due to impairments Decreased functional independence   Clinical Presentation Stable/Uncomplicated   Clinical Presentation Rationale see MR   Clinical Decision Making (Complexity) Low complexity   Therapy Frequency` 2 times/day  (begin 1/7)   Predicted Duration of Therapy Intervention (days/wks) 10 days   Anticipated Discharge Disposition Acute Rehabilitation Facility   Risk & Benefits of therapy have been explained Yes   Patient, Family & other staff in agreement with plan of care Yes   [facility] Pretty Padded Room TM \"6 Clicks\"   2016, Trustees of [facility], under license to [employer].  All rights reserved.   6 Clicks Short Forms Basic Mobility Inpatient Short Form   [facility] [employer]  \"6 Clicks\" V.2 Basic Mobility Inpatient Short Form   1. Turning from your back to your side while in a flat bed without using bedrails? 2 - A Lot   2. Moving from lying on your back to sitting on the side of a flat bed without using bedrails? 2 - A Lot   3. Moving to and from a " bed to a chair (including a wheelchair)? 1 - Total   4. Standing up from a chair using your arms (e.g., wheelchair, or bedside chair)? 2 - A Lot   5. To walk in hospital room? 1 - Total   6. Climbing 3-5 steps with a railing? 1 - Total   Basic Mobility Raw Score (Score out of 24.Lower scores equate to lower levels of function) 9   Total Evaluation Time   Total Evaluation Time (Minutes) 10

## 2019-01-06 NOTE — PROGRESS NOTES
Patient was on and off BIPAP for increase WOB and desaturation throughout the day. Patient is currently on HFNC 70% and 30L with SpO2 96%. BBS clear diminished and will continue to monitor.    Mat Hicks RT  1/6/2019

## 2019-01-06 NOTE — PROGRESS NOTES
1/6/19 1700  Pts oxygen saturations 80's to low 90's on BIPAP. Difficult to get good wave form. Dr. Ashraf notified. ABG drawn.

## 2019-01-06 NOTE — PLAN OF CARE
Pt extubated to aerosol mask at 1237, then switched to 4L NC mid afternoon, tolerating well.  LS cta.  T37.5, coughing and deep breathing with reminders.  Sinus rhythm 80s, has been off pressors since before noon; maintaining a MAP over 60 and SBP under 120; after IV fentanyl SBP dips to high 80s but MAP stays over 60 ad CCO is not affected.  PAD and CVP WNL.  Temporary pacer is connected but is off.  Incisions/dressings CDI.  Good distal CMS.  Currently off of the insulin drip (last BGs 91 and 107).  UOP is low, MD's aware.    Chest tube output thin dark red, about 15ml/hr today, no airleak.  Multiple family members updated at bedside throughout day.

## 2019-01-06 NOTE — PROGRESS NOTES
1/6/19  1130  Pt's SBP >140 Titration of nitroglycerin gtt. Dr. Wharton notified. Lopressor scheduled IV q6.     1630  Pt continues to be hypertensive.  Nitroglycerin gtt at 1 mcg. Dr. Wharton notified. PRN hydralazine given.

## 2019-01-06 NOTE — PROGRESS NOTES
[facility]   CRITICAL CARE     Priya Funez MRN: [id number]  1940  Date of Admission:1/4/2019          HPI   Priya Funez IS a 78 year old female admitted on 1/4/2019 with significant history for HTN who presented with sudden onset of back pain found to have type A aortic dissection and sent for emergency repair.     She is POD#1 for aortic dissection repair with graft. Bypass time was 114min. Net I/O was 3.9L. She was admitted to ICU for ongoing care.    1/5: did well overnight.  Epi weaned off this morning; continues on low dose phenylephrine.  Hgb stable.  1/6: extubated yesterday afternoon -- poor inspiratory effort and weak cough through the day.  Placed on Bipap overnight.  NTG gtt for improved BP control.           Past Medical History:      Past Medical History:   Diagnosis Date     Hypertension              Past Surgical History:    No past surgical history on file.         Social History:     Social History     Tobacco Use     Smoking status: Never Smoker     Smokeless tobacco: Never Used     Tobacco comment: hisband   Substance Use Topics     Alcohol use: No     Frequency: Never            Family History:   No family history on file.          Allergies:   Please see allergy list which was reviewed this admission.         Medications:       acetaminophen  975 mg Oral Q8H     albumin human  12.5 g Intravenous Q8H     gabapentin  100 mg Oral TID     heparin  5,000 Units Subcutaneous Q8H     insulin aspart  1-6 Units Subcutaneous Q4H     metoprolol tartrate  25 mg Oral Q12H    Or     metoprolol  25 mg Per NG tube Q12H     mupirocin  0.5 g Both Nostrils BID     pantoprazole (PROTONIX) IV  40 mg Intravenous Daily     sodium chloride (PF)  3 mL Intracatheter Q8H     [START ON 1/7/2019] acetaminophen, IV fluid REPLACEMENT ONLY, glucose **OR** dextrose **OR** glucagon, fentaNYL, hydrALAZINE, lidocaine 4%, lidocaine (buffered or not buffered), magnesium sulfate, magnesium sulfate,  meperidine, methocarbamol, naloxone, ondansetron **OR** ondansetron, oxyCODONE IR, potassium chloride, potassium chloride with lidocaine, potassium chloride, potassium chloride, potassium chloride, sodium chloride (PF), sodium phosphate, sodium phosphate, sodium phosphate, sodium phosphate           Physical Examination:   Temp:  [99.5  F (37.5  C)-100.6  F (38.1  C)] 99.9  F (37.7  C)  Heart Rate:  [80-91] 88  Resp:  [6-92] 20  BP: (109)/(64) 109/64  MAP:  [61 mmHg-87 mmHg] 73 mmHg  Arterial Line BP: ()/(47-69) 110/52  FiO2 (%):  [40 %-80 %] 70 %  SpO2:  [83 %-100 %] 98 %    Intake/Output Summary (Last 24 hours) at 1/4/2019 2311  Last data filed at 1/4/2019 2211  Gross per 24 hour   Intake 6301 ml   Output 2636 ml   Net 3665 ml     Wt Readings from Last 4 Encounters:   01/06/19 92.1 kg (203 lb 0.7 oz)   09/14/08 80.7 kg (178 lb)     Arterial Line BP: ()/(47-69) 110/52  MAP:  [61 mmHg-87 mmHg] 73 mmHg  BP - Mean:  [80] 80  CVP:  [2 mmHg-19 mmHg] 6 mmHg  SVO2:  [59 %-68 %] 68 %  Ventilation Mode: CPAP/PS  (Continuous positive airway pressure with Pressure Support)  FiO2 (%): 70 %  Rate Set (breaths/minute): 12 breaths/min  Tidal Volume Set (mL): 450 mL  PEEP (cm H2O): 5 cmH2O  Pressure Support (cm H2O): 5 cmH2O  Oxygen Concentration (%): 55 %  Resp: 20    Recent Labs   Lab 01/06/19  0025 01/05/19  0500 01/04/19  2255 01/04/19  1948   PH 7.39 7.41 7.32* 7.39   PCO2 36 37 46* 38   PO2 89 192* 150* 340*   HCO3 21 24 24 23       GEN: awake, alert, seated up in bed, NAD  HEENT:  RIJ sheath with central line and PA catheter  PULM: unlabored, clear anteriororly  CV/COR: RRR S1S2 no gallop,  No rub, no murmur. Incision c/d/i.   ABD: incision c/d/i  EXT:  Edema   warm  NEURO: grossly intact.  Awakens to voice and nods appropriately.  SKIN: no obvious rash      Assessment and plan :     Neurology/Psychiatry/Pain/Sedation:   1. Sedation/Analgesia  -PRN analgesia with fentanyl; oxycodone  -Scheduled gabapentin and  tylenol    Cardiovascular/Hemodynamics   1.Type A aortic dissection repair 1/4   -NTG gtt with goal SBP < 110 while keeping MAP > 65  -Metoprolol 25 mg q12  -chest tube in place  -Further recs per CTS    Pulmonary/Ventilator Management:   1. Acute hypoxic respiratory failure -- weak cough and poor inspiratory effort since extubation  -Continue bipap <> HFNC prn and pulmonary hygiene      Renal/Fluids/Electrolytes   ### IMAN with crt 1.72  -Nephrology consulted  -Renal US, flow present in right and left renal arteries  -Cautious replacement of electrolytes per unit protocol      GI/Nutrition:   1. NPO for now; ice chips ok.  PPI for ppx.     Endocrine/Hematology/Oncology:   1. ICU glucose protocol  2. Hgb stable at 9.1; continue to monitor  3. Thrombocytopenia -- likely reactive, continue to monitor      Disposition/Code Status/Other  1. ICU status  2. Code: Full   3. Family updated at bedside this morning.    ICU Prophylaxis:   1. DVT: SQH  2. VAP: not indicated  3. Stress Ulcer: PPI  4. Restraints: Not needed  5. IV Access - required    I have personally reviewed the daily labs, imaging studies, cultures and discussed the case with referring physician and consulting physicians.     This patient is critically ill and I have provided 35 minutes of critical care time (excluding procedures)    Angella Ashraf MD  Pulmonary and Critical Care Medicine            Data:   All data and imaging reviewed     ROUTINE ICU LABS (Last four results)  CMP  Recent Labs   Lab 01/06/19  0435 01/05/19  0500 01/04/19  2255 01/04/19 2012 01/04/19  1400    143 144 145*   < > 137   POTASSIUM 3.5 4.6 3.1* 3.6   < > 3.4   CHLORIDE 111* 109 107 109  --  105   CO2 21 23 24 21  --  22   ANIONGAP 11 11 13 15*  --  10   * 167* 226* 214*  --  125*   BUN 39* 25 20 17  --  17   CR 1.72* 1.04 0.83 0.75  --  0.80   GFRESTIMATED 28* 52* 69 77  --  72   GFRESTBLACK 33* 60* 80 89  --  83   BESS 7.3* 7.4* 7.2* 9.2  --  8.8   MAG 2.2 2.3 2.5*   --   --   --    PHOS  --  4.1 4.5  --   --   --    PROTTOTAL  --  5.2* 4.8*  --   --  6.8   ALBUMIN  --  3.4 2.6*  --   --  3.6   BILITOTAL  --  1.5* 1.1  --   --  1.3   ALKPHOS  --  43 54  --   --  92   AST  --  188* 147*  --   --  27   ALT  --  75* 65*  --   --  24    < > = values in this interval not displayed.     CBC  Recent Labs   Lab 01/06/19  0435 01/05/19  0500 01/04/19  2255 01/04/19 2012   WBC 10.3 8.8 11.9* 15.1*   RBC 3.19* 2.90* 3.32* 2.24*   HGB 9.1* 8.2* 9.6* 6.9*   HCT 26.6* 24.9* 28.6* 19.7*   MCV 83 86 86 88   MCH 28.5 28.3 28.9 30.8   MCHC 34.2 32.9 33.6 35.0   RDW 17.0* 14.8 13.8 13.3   PLT 74* 106* 122* 134*     INR  Recent Labs   Lab 01/06/19  0435 01/04/19 2255 01/04/19 2012   INR 1.46* 1.35* 2.03*     Arterial Blood Gas  Recent Labs   Lab 01/06/19  0025 01/05/19  0500 01/04/19  2255 01/04/19  1948   PH 7.39 7.41 7.32* 7.39   PCO2 36 37 46* 38   PO2 89 192* 150* 340*   HCO3 21 24 24 23       All cultures:  No results for input(s): CULT in the last 168 hours.  Recent Results (from the past 24 hour(s))   Chest XR,  PA & LAT    Narrative    CHEST TWO VIEW   1/4/2019 2:23 PM     HISTORY: Shortness of breath, chest pain.    COMPARISON: None.      Impression    IMPRESSION: PA and lateral views of the chest. Patient is slightly  rotated to the right on the frontal view. Lungs are clear. Heart is  normal in size. No effusions are evident. No pneumothorax. Hiatal  hernia with air-fluid level projects through the cardiac silhouette.    KENZIE MARTE MD   CT Aortic Survey w Contrast   Result Value    Radiologist flags Aortic dissection (AA)    Narrative    CT AORTIC SURVEY WITH CONTRAST 1/4/2019 2:47 PM     HISTORY: See the Clinical Information for Interpreting Provider; jaw  pain, near syncope, abdominal pain, burping, evaluate for aortic  dissection.    TECHNIQUE: Initial noncontrast imaging is performed through the chest.  Additional thin section axial images from the thoracic inlet to  the  symphysis are performed with additional coronal and sagittal  reformatted images. 80 mL of Isovue 370 are given intravenously.   Radiation dose for this scan was reduced using automated exposure  control, adjustment of the mA and/or kV according to patient size, or  iterative reconstruction technique.    FINDINGS:   Chest: Calcified granuloma medial right upper lobe is present on  series 6, image 18. Subpleural nodular density left lower lobe  measures 0.4 cm on image 58. Lungs are otherwise clear. No pleural or  pericardial fluid. Heart is normal in size. No enlarged lymph nodes.  Thyroid gland is normal in size. Central pulmonary arteries are  patent.    Ascending thoracic aorta is aneurysmal on initial noncontrast images  measuring up to 5.6 x 5.4 cm on series 4, image 30. Aortic dissection  is present starting in the ascending thoracic aorta and extends  through the aortic arch and descending thoracic aorta terminating in  the region of the abdominal aortic bifurcation into the right and left  common iliac arteries. Dissection flap also extends over a short  segment into the right brachiocephalic artery and left subclavian  artery. Flow in the left subclavian artery near the thoracic inlet on  series 5, image 17 markedly narrows compared to the contralateral  right subclavian artery. This may be related to the dissection.  Correlate for any changes of ischemia in left upper extremity. A  larger and smaller lumen is present and contrast opacification in each  lumen appears fairly symmetric in the thoracic aorta. The celiac axis  origin and right renal artery origin appear to arise off the smaller  of the two lumens and there is marked decreased or absent enhancement  of the right kidney probably representing arterial occlusion. Celiac  axis appears well perfused. SMA appears to arise off the larger lumen  and after a short segment of patency is occluded. Loops of small bowel  in the abdomen demonstrate wall  thickening probably related to  ischemia. Left renal artery arises off the larger lumen and the left  kidney appears perfused. The JARAD also appears to arise off the larger  lumen but is difficult to confirm but is patent. Both the right and  left iliac arteries are patent and appear well-perfused.    Abdomen: The liver with small left hepatic lobe cyst or hemangioma is  noted on series 5, image 90 and is unremarkable. The spleen,  gallbladder, pancreas and adrenal glands are within normal limits.  Right kidney again is not perfused. Nonobstructing renal collecting  system stones are noted in the right kidney. No hydronephrosis. Left  kidney appears perfused and no renal calculi are evident. No  hydronephrosis. Small bowel again demonstrates mild wall thickening  likely resulting from SMA occlusion and resulting ischemia. No  significant mesenteric stranding is currently evident. No  diverticulitis, obstruction or appendicitis.    Pelvis: The bladder, uterus and rectum are unremarkable. No enlarged  pelvic lymph nodes or free fluid. Bone window examination demonstrates  degenerative spine changes. No aggressive appearing bone lesions are  noted.      Impression    IMPRESSION:  1. Extensive aortic dissection from the ascending thoracic aorta  through the proximal aortic bifurcation in the abdomen. 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 Correlate clinically for any  changes of ischemia involving the left upper extremity.  2. No other acute changes are evident in the chest, abdomen or pelvis.    [Critical Result: Aortic dissection]    Finding was identified on 1/4/2019 3:19 PM.     Dr. Alicia was contacted by me on 1/4/2019 3:31 PM and verbalized  understanding of the critical result.     KENZIE MARTE MD    Surgery [facility]  L/T 5 Min Fluoro w Stills    Narrative    This exam was marked as non-reportable because it will not be read by a   radiologist or a [place] non-radiologist provider.

## 2019-01-06 NOTE — PROGRESS NOTES
Patient placed on BIPAP for increased O2 needs. Tolerating well. Alarm set to level 8. Skin intact, gel pad and mepilex applied. Will continue to follow and wean as tolerated.    Maurice BRUNNER

## 2019-01-06 NOTE — CONSULTS
Consult Date:  01/06/2019      RENAL CONSULTATION      REQUESTING PHYSICIAN:  Bobby Wharton MD      CONSULTANT:  Nathaniel Donovan MD      REASON FOR CONSULTATION:  Oliguria and acute kidney injury.      HISTORY OF PRESENT ILLNESS:  This is a 78-year-old I am seeing in the Intensive Care Unit.  She is postoperative day #2 after emergency repair of an aortic dissection and she was on pressors.  She now has actual hypertension, was on a nitroglycerin drip, and will most likely be starting metoprolol.  She has gotten over the last 2 days normal saline, albumin and also Lasix.      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  She has respiratory failure and is still on BiPAP.      MEDICATIONS:  Lasix IV x1, Neurontin, subcutaneous heparin, metoprolol, Protonix and vancomycin, but this has been stopped.      SOCIAL HISTORY:  She lives in [place], [place], with her .  She does not smoke or drink.  She has a house in [place].      FAMILY HISTORY:  Noncontributory to her current problems.      REVIEW OF SYSTEMS:  She currently complains of some incisional pain.  She denies shortness of breath and feels quite comfortable.  She is not taking p.o.  She tells me she has no prior history of kidney disease and has not had any urological surgeries.  She is not a diabetic and has had no urinary symptoms.      PHYSICAL EXAMINATION:   GENERAL:  She is alert, she is responsive.  She is lying comfortably in the bed.  She is on BiPAP.  She has a midline incision.  She has chest tubes in place.   VITAL SIGNS:  Her weight was recorded on admission at 77.1; it is now 92.1.  I question the accuracy of this.  Her intake and output yesterday were 1698 in and 1285 out.   SKIN:  Negative.   HEENT:  Head shows no trauma.  The eyes show pupils round and reactive to light.  Mouth shows a BiPAP mask in place.   NECK:   Supple, there is a [place]-Jaqui catheter Cordis in the right.   LUNGS:  Show clear anterior breath sounds.   CARDIAC:  Regular rhythm, normal S1, S2, no murmur.   ABDOMEN:  Shows there are chest tubes exiting and a pacemaker in place.   GENITOURINARY:  Barrientos catheter in place.   EXTREMITIES:  Normal nails, joints and pulses.  No edema.   NEUROLOGIC:  Normal mental status, symmetric cranial nerves, moves all 4 extremities well.      LABORATORY DATA:  In 06/09 the creatinine was 0.66.  In 01/04/2019 on admission, the creatinine was 0.8.  Yesterday it was 1.04 and today is 1.72.  Her electrolytes show a potassium of 2.5, bicarbonate 21, sodium 123.  Blood gasses are unremarkable.  Hemoglobin is 9.1, platelet count is low at 74.  A CT scan was done on contrast on 01/04/2019 that showed an ascending aortic aneurysm with dissection.  There was arterial occlusion of the right kidney with decreased perfusion.  The left renal artery appeared patent.  There were some stones on the right.  There was no hydronephrosis on either side.  Today a chest x-ray shows an enlarged heart but the lungs appear clear.      IMPRESSION:   1.  Acute kidney injury postoperatively.  She was on pressors initially.  She also got a CT with contrast done on 01/04/2019.  She has an occluded and perhaps nonfunctional right renal artery.  We will give her IV fluids and albumin to make sure she is appropriately volume expanded.  We will avoid further nephrotoxins and I will order a renogram to observe renal blood flow.  She has also got a renal ultrasound ordered by [facility] Surgery and the results are pending.   2.  Postoperative day #2 aortic dissection.  She is on BiPAP.  She is hemodynamically stable and even a little hypertensive.  She appears to be progressing well from the surgery.   3.  Hypertension.  We will avoid diuretics for now.  She is on metoprolol.  We can adjust blood pressure medications as needed.         BRENDA ADKINS MD              D: 2019   T: 2019   MT: RENEE      Name:     CEM MORRIS   MRN:      [id number]        Account:       [id number]   :      1940           Consult Date:  2019      Document: [id number]

## 2019-01-06 NOTE — PROVIDER NOTIFICATION
Dr. Wharton notified of low urine output and hemodynamics. 500ml of albumin ordered to be given slowly.

## 2019-01-06 NOTE — PLAN OF CARE
Patient alert and oriented, follows commands. Decreased urine output, Dr. Wharton updated 500ml Albumin ordered, urine output did not improve with albumin. Pt had very poor IS effort, diminished bases bilaterally, placed on bipap. Increased O2 needs on bipap to 80%, 12/5, ABG's done, Dr. Barnes did bedside ultrasound. Lasix 20mg IV ordered, urine output improved. Chest tube output minimal. Nitroglycerin started to maintain SBP <120. Bowel sounds are hypoactive, pt remains NPO.

## 2019-01-06 NOTE — PROGRESS NOTES
[facility]  Cardiovascular and Thoracic Surgery Daily Note          Assessment and Plan:   POD#2 s/p repair of Type A aortic dissection by Dr. Winston Winslow.    -CVS: HR and BP WNL. Lactate normal this AM. Patient on low dose nitroglycerin. Will continue with nitroglycerin for now to control BP. Goal MAP >60 with SBP < 110 mmHg.     -Resp: Cont BIPAP. Pulm toilet. OOB as tolerated.     -Neuro: IV fentanyl prn. Mentally intact.    -Renal: Cr elevated today with oliguria. Will consult nephrology for assistance and obtain renal U/S to evaluate R kidney. AM BMP.     -GI: NPO. Will continue NPO due to use of BIPAP. Low concern for bowel ischemia given normal abd exam and normal lactate. Hold off on CTA today due to increased creatinine.    -:  Cont coello.    -Endo: Cont insulin drip    -FEN: Replete electrolytes as needed    -ID: Prophylactic abx x 48 hours post op. No signs of infection at this time.    -Heme: Hgb 9.1 after 1 unit rbc yesterday. No signs of bleeding. Postop anemia.    -Proph: Heparin 5000 mg IV q8 hrs, PPI    -Dispo: cont ICU          Interval History:   Extubated yesterday AM. Poor inspiratory effort throughout day despite aggressive cares per RT. UOP decreased last PM; did not improve with albumin. BIPAP required overnight. Poor response to single dose of lasix.         Medications:       acetaminophen  975 mg Oral Q8H     gabapentin  100 mg Oral TID     heparin  5,000 Units Subcutaneous Q8H     insulin aspart  1-6 Units Subcutaneous Q4H     metoprolol tartrate  25 mg Oral Q12H    Or     metoprolol  25 mg Per NG tube Q12H     mupirocin  0.5 g Both Nostrils BID     pantoprazole (PROTONIX) IV  40 mg Intravenous Daily     sodium chloride (PF)  3 mL Intracatheter Q8H     [START ON 1/7/2019] acetaminophen, IV fluid REPLACEMENT ONLY, glucose **OR** dextrose **OR** glucagon, fentaNYL, hydrALAZINE, lidocaine 4%, lidocaine (buffered or not buffered), magnesium sulfate, magnesium sulfate,  "meperidine, methocarbamol, naloxone, ondansetron **OR** ondansetron, oxyCODONE IR, potassium chloride, potassium chloride with lidocaine, potassium chloride, potassium chloride, potassium chloride, sodium chloride (PF), sodium phosphate, sodium phosphate, sodium phosphate, sodium phosphate          Physical Exam:   Vitals were reviewed  Blood pressure 106/59, pulse 81, temperature 100.2  F (37.9  C), temperature source Bladder, resp. rate 22, height 1.626 m (5' 4\"), weight 91.6 kg (201 lb 15.1 oz), SpO2 97 %.  Rhythm: NSR    Lungs: Diminished bilat    Cardiovascular: RRR    Abdomen: soft, NT, ND    Extremeties: mild edema    Incision: CDI    CT: 250 out    Weight:   Vitals:    01/04/19 1343 01/05/19 0600   Weight: 77.1 kg (170 lb) 91.6 kg (201 lb 15.1 oz)            Data:   Labs:   Lab Results   Component Value Date    WBC 10.3 01/06/2019     Lab Results   Component Value Date    RBC 3.19 01/06/2019     Lab Results   Component Value Date    HGB 9.1 01/06/2019     Lab Results   Component Value Date    HCT 26.6 01/06/2019     No components found for: MCT  Lab Results   Component Value Date    MCV 83 01/06/2019     Lab Results   Component Value Date    MCH 28.5 01/06/2019     Lab Results   Component Value Date    MCHC 34.2 01/06/2019     Lab Results   Component Value Date    RDW 17.0 01/06/2019     Lab Results   Component Value Date    PLT 74 01/06/2019       Last Basic Metabolic Panel:  Lab Results   Component Value Date     01/06/2019      Lab Results   Component Value Date    POTASSIUM 3.5 01/06/2019     Lab Results   Component Value Date    CHLORIDE 111 01/06/2019     Lab Results   Component Value Date    BESS 7.3 01/06/2019     Lab Results   Component Value Date    CO2 21 01/06/2019     Lab Results   Component Value Date    BUN 39 01/06/2019     Lab Results   Component Value Date    CR 1.72 01/06/2019     Lab Results   Component Value Date     01/06/2019       CXR 1/6/19:  IMPRESSION: Interval " extubation. Star-Jaqui Jaqui catheter remains in  the proximal right pulmonary artery. Previous midline sternotomy noted  along with a spinal drain. Interval removal of nasogastric tube as  well. The heart is mildly enlarged. The aorta is unfolded and the  mediastinum appears wide, but this could be just due to supine  portable technique. Elevated right hemidiaphragm. Minimal scarring or  atelectasis noted in both bases.    Bobby Wharton MD  Fellow, CT Surgery

## 2019-01-07 ENCOUNTER — APPOINTMENT (OUTPATIENT)
Dept: CARDIOLOGY | Facility: CLINIC | Age: 79
DRG: 003 | End: 2019-01-07
Payer: COMMERCIAL

## 2019-01-07 ENCOUNTER — APPOINTMENT (OUTPATIENT)
Dept: NUCLEAR MEDICINE | Facility: CLINIC | Age: 79
DRG: 003 | End: 2019-01-07
Payer: COMMERCIAL

## 2019-01-07 ENCOUNTER — APPOINTMENT (OUTPATIENT)
Dept: GENERAL RADIOLOGY | Facility: CLINIC | Age: 79
DRG: 003 | End: 2019-01-07
Payer: COMMERCIAL

## 2019-01-07 LAB
ALBUMIN SERPL-MCNC: 2.8 G/DL (ref 3.4–5)
ALBUMIN SERPL-MCNC: 3 G/DL (ref 3.4–5)
ALP SERPL-CCNC: 53 U/L (ref 40–150)
ALT SERPL W P-5'-P-CCNC: 19 U/L (ref 0–50)
ANION GAP SERPL CALCULATED.3IONS-SCNC: 10 MMOL/L (ref 3–14)
ANION GAP SERPL CALCULATED.3IONS-SCNC: 8 MMOL/L (ref 3–14)
AST SERPL W P-5'-P-CCNC: 51 U/L (ref 0–45)
BASE DEFICIT BLDA-SCNC: 5.9 MMOL/L
BASE DEFICIT BLDV-SCNC: 5.5 MMOL/L
BILIRUB SERPL-MCNC: 1.6 MG/DL (ref 0.2–1.3)
BUN SERPL-MCNC: 54 MG/DL (ref 7–30)
BUN SERPL-MCNC: 55 MG/DL (ref 7–30)
CALCIUM SERPL-MCNC: 7.3 MG/DL (ref 8.5–10.1)
CALCIUM SERPL-MCNC: 7.4 MG/DL (ref 8.5–10.1)
CHLORIDE SERPL-SCNC: 112 MMOL/L (ref 94–109)
CHLORIDE SERPL-SCNC: 115 MMOL/L (ref 94–109)
CO2 SERPL-SCNC: 20 MMOL/L (ref 20–32)
CO2 SERPL-SCNC: 21 MMOL/L (ref 20–32)
CREAT SERPL-MCNC: 1.71 MG/DL (ref 0.52–1.04)
CREAT SERPL-MCNC: 1.99 MG/DL (ref 0.52–1.04)
ERYTHROCYTE [DISTWIDTH] IN BLOOD BY AUTOMATED COUNT: 16.8 % (ref 10–15)
FERRITIN SERPL-MCNC: 93 NG/ML (ref 8–252)
FOLATE SERPL-MCNC: 14.6 NG/ML
GFR SERPL CREATININE-BSD FRML MDRD: 24 ML/MIN/{1.73_M2}
GFR SERPL CREATININE-BSD FRML MDRD: 29 ML/MIN/{1.73_M2}
GLUCOSE BLDC GLUCOMTR-MCNC: 102 MG/DL (ref 70–99)
GLUCOSE BLDC GLUCOMTR-MCNC: 114 MG/DL (ref 70–99)
GLUCOSE BLDC GLUCOMTR-MCNC: 124 MG/DL (ref 70–99)
GLUCOSE BLDC GLUCOMTR-MCNC: 94 MG/DL (ref 70–99)
GLUCOSE BLDC GLUCOMTR-MCNC: 97 MG/DL (ref 70–99)
GLUCOSE SERPL-MCNC: 106 MG/DL (ref 70–99)
GLUCOSE SERPL-MCNC: 119 MG/DL (ref 70–99)
HCO3 BLD-SCNC: 19 MMOL/L (ref 21–28)
HCO3 BLDV-SCNC: 20 MMOL/L (ref 21–28)
HCT VFR BLD AUTO: 24.6 % (ref 35–47)
HGB BLD-MCNC: 8.3 G/DL (ref 11.7–15.7)
IRON SATN MFR SERPL: 4 % (ref 15–46)
IRON SERPL-MCNC: 5 UG/DL (ref 35–180)
LACTATE BLD-SCNC: 1.8 MMOL/L (ref 0.7–2)
MAGNESIUM SERPL-MCNC: 2.3 MG/DL (ref 1.6–2.3)
MAGNESIUM SERPL-MCNC: 2.5 MG/DL (ref 1.6–2.3)
MCH RBC QN AUTO: 28.7 PG (ref 26.5–33)
MCHC RBC AUTO-ENTMCNC: 33.7 G/DL (ref 31.5–36.5)
MCV RBC AUTO: 85 FL (ref 78–100)
O2/TOTAL GAS SETTING VFR VENT: ABNORMAL %
O2/TOTAL GAS SETTING VFR VENT: ABNORMAL %
OXYHGB MFR BLD: 94 % (ref 92–100)
OXYHGB MFR BLDV: 46 %
PCO2 BLD: 32 MM HG (ref 35–45)
PCO2 BLDV: 35 MM HG (ref 40–50)
PH BLD: 7.37 PH (ref 7.35–7.45)
PH BLDV: 7.35 PH (ref 7.32–7.43)
PHOSPHATE SERPL-MCNC: 3.2 MG/DL (ref 2.5–4.5)
PHOSPHATE SERPL-MCNC: 3.4 MG/DL (ref 2.5–4.5)
PLATELET # BLD AUTO: 89 10E9/L (ref 150–450)
PO2 BLD: 72 MM HG (ref 80–105)
PO2 BLDV: 27 MM HG (ref 25–47)
POTASSIUM SERPL-SCNC: 3.5 MMOL/L (ref 3.4–5.3)
POTASSIUM SERPL-SCNC: 3.7 MMOL/L (ref 3.4–5.3)
PROCALCITONIN SERPL-MCNC: 5.67 NG/ML
PROT SERPL-MCNC: 5.4 G/DL (ref 6.8–8.8)
RBC # BLD AUTO: 2.89 10E12/L (ref 3.8–5.2)
SODIUM SERPL-SCNC: 143 MMOL/L (ref 133–144)
SODIUM SERPL-SCNC: 143 MMOL/L (ref 133–144)
TIBC SERPL-MCNC: 124 UG/DL (ref 240–430)
URATE SERPL-MCNC: 6 MG/DL (ref 2.6–6)
VANCOMYCIN SERPL-MCNC: 14.3 MG/L
VIT B12 SERPL-MCNC: 919 PG/ML (ref 193–986)
WBC # BLD AUTO: 8.3 10E9/L (ref 4–11)

## 2019-01-07 PROCEDURE — 40000264 ECHOCARDIOGRAM COMPLETE

## 2019-01-07 PROCEDURE — 93306 TTE W/DOPPLER COMPLETE: CPT | Mod: 26 | Performed by: INTERNAL MEDICINE

## 2019-01-07 PROCEDURE — 82607 VITAMIN B-12: CPT | Performed by: STUDENT IN AN ORGANIZED HEALTH CARE EDUCATION/TRAINING PROGRAM

## 2019-01-07 PROCEDURE — 36600 WITHDRAWAL OF ARTERIAL BLOOD: CPT

## 2019-01-07 PROCEDURE — 99233 SBSQ HOSP IP/OBS HIGH 50: CPT | Performed by: NURSE PRACTITIONER

## 2019-01-07 PROCEDURE — 40000275 ZZH STATISTIC RCP TIME EA 10 MIN

## 2019-01-07 PROCEDURE — 82805 BLOOD GASES W/O2 SATURATION: CPT | Performed by: NURSE PRACTITIONER

## 2019-01-07 PROCEDURE — 25000128 H RX IP 250 OP 636: Performed by: STUDENT IN AN ORGANIZED HEALTH CARE EDUCATION/TRAINING PROGRAM

## 2019-01-07 PROCEDURE — 25000128 H RX IP 250 OP 636: Performed by: PHYSICIAN ASSISTANT

## 2019-01-07 PROCEDURE — 25000128 H RX IP 250 OP 636: Performed by: INTERNAL MEDICINE

## 2019-01-07 PROCEDURE — 25500064 ZZH RX 255 OP 636: Performed by: THORACIC SURGERY (CARDIOTHORACIC VASCULAR SURGERY)

## 2019-01-07 PROCEDURE — 94640 AIRWAY INHALATION TREATMENT: CPT | Mod: 76

## 2019-01-07 PROCEDURE — 25000132 ZZH RX MED GY IP 250 OP 250 PS 637: Performed by: STUDENT IN AN ORGANIZED HEALTH CARE EDUCATION/TRAINING PROGRAM

## 2019-01-07 PROCEDURE — 25800025 ZZH RX 258: Performed by: INTERNAL MEDICINE

## 2019-01-07 PROCEDURE — 83735 ASSAY OF MAGNESIUM: CPT | Performed by: STUDENT IN AN ORGANIZED HEALTH CARE EDUCATION/TRAINING PROGRAM

## 2019-01-07 PROCEDURE — 25000125 ZZHC RX 250

## 2019-01-07 PROCEDURE — 25000128 H RX IP 250 OP 636

## 2019-01-07 PROCEDURE — 82805 BLOOD GASES W/O2 SATURATION: CPT | Performed by: STUDENT IN AN ORGANIZED HEALTH CARE EDUCATION/TRAINING PROGRAM

## 2019-01-07 PROCEDURE — 83605 ASSAY OF LACTIC ACID: CPT | Performed by: SURGERY

## 2019-01-07 PROCEDURE — 83735 ASSAY OF MAGNESIUM: CPT | Performed by: NURSE PRACTITIONER

## 2019-01-07 PROCEDURE — 82728 ASSAY OF FERRITIN: CPT | Performed by: STUDENT IN AN ORGANIZED HEALTH CARE EDUCATION/TRAINING PROGRAM

## 2019-01-07 PROCEDURE — 93005 ELECTROCARDIOGRAM TRACING: CPT

## 2019-01-07 PROCEDURE — 00000146 ZZHCL STATISTIC GLUCOSE BY METER IP

## 2019-01-07 PROCEDURE — 87070 CULTURE OTHR SPECIMN AEROBIC: CPT | Performed by: STUDENT IN AN ORGANIZED HEALTH CARE EDUCATION/TRAINING PROGRAM

## 2019-01-07 PROCEDURE — 25000125 ZZHC RX 250: Performed by: PHYSICIAN ASSISTANT

## 2019-01-07 PROCEDURE — 94640 AIRWAY INHALATION TREATMENT: CPT

## 2019-01-07 PROCEDURE — 80202 ASSAY OF VANCOMYCIN: CPT | Performed by: INTERNAL MEDICINE

## 2019-01-07 PROCEDURE — 25000128 H RX IP 250 OP 636: Performed by: THORACIC SURGERY (CARDIOTHORACIC VASCULAR SURGERY)

## 2019-01-07 PROCEDURE — 83540 ASSAY OF IRON: CPT | Performed by: STUDENT IN AN ORGANIZED HEALTH CARE EDUCATION/TRAINING PROGRAM

## 2019-01-07 PROCEDURE — 84100 ASSAY OF PHOSPHORUS: CPT | Performed by: STUDENT IN AN ORGANIZED HEALTH CARE EDUCATION/TRAINING PROGRAM

## 2019-01-07 PROCEDURE — 78707 K FLOW/FUNCT IMAGE W/O DRUG: CPT

## 2019-01-07 PROCEDURE — 71045 X-RAY EXAM CHEST 1 VIEW: CPT

## 2019-01-07 PROCEDURE — 80053 COMPREHEN METABOLIC PANEL: CPT | Performed by: STUDENT IN AN ORGANIZED HEALTH CARE EDUCATION/TRAINING PROGRAM

## 2019-01-07 PROCEDURE — 87205 SMEAR GRAM STAIN: CPT | Performed by: STUDENT IN AN ORGANIZED HEALTH CARE EDUCATION/TRAINING PROGRAM

## 2019-01-07 PROCEDURE — 84145 PROCALCITONIN (PCT): CPT | Performed by: NURSE PRACTITIONER

## 2019-01-07 PROCEDURE — 84550 ASSAY OF BLOOD/URIC ACID: CPT | Performed by: STUDENT IN AN ORGANIZED HEALTH CARE EDUCATION/TRAINING PROGRAM

## 2019-01-07 PROCEDURE — A9562 TC99M MERTIATIDE: HCPCS | Performed by: THORACIC SURGERY (CARDIOTHORACIC VASCULAR SURGERY)

## 2019-01-07 PROCEDURE — 80069 RENAL FUNCTION PANEL: CPT | Performed by: NURSE PRACTITIONER

## 2019-01-07 PROCEDURE — 94660 CPAP INITIATION&MGMT: CPT

## 2019-01-07 PROCEDURE — 82746 ASSAY OF FOLIC ACID SERUM: CPT | Performed by: INTERNAL MEDICINE

## 2019-01-07 PROCEDURE — P9047 ALBUMIN (HUMAN), 25%, 50ML: HCPCS | Performed by: INTERNAL MEDICINE

## 2019-01-07 PROCEDURE — P9041 ALBUMIN (HUMAN),5%, 50ML: HCPCS

## 2019-01-07 PROCEDURE — 25000125 ZZHC RX 250: Performed by: NURSE PRACTITIONER

## 2019-01-07 PROCEDURE — 20000003 ZZH R&B ICU

## 2019-01-07 PROCEDURE — 83550 IRON BINDING TEST: CPT | Performed by: STUDENT IN AN ORGANIZED HEALTH CARE EDUCATION/TRAINING PROGRAM

## 2019-01-07 PROCEDURE — 25000132 ZZH RX MED GY IP 250 OP 250 PS 637: Performed by: PHYSICIAN ASSISTANT

## 2019-01-07 PROCEDURE — 34300033 ZZH RX 343: Performed by: THORACIC SURGERY (CARDIOTHORACIC VASCULAR SURGERY)

## 2019-01-07 PROCEDURE — 85027 COMPLETE CBC AUTOMATED: CPT | Performed by: STUDENT IN AN ORGANIZED HEALTH CARE EDUCATION/TRAINING PROGRAM

## 2019-01-07 PROCEDURE — 93010 ELECTROCARDIOGRAM REPORT: CPT | Performed by: INTERNAL MEDICINE

## 2019-01-07 RX ORDER — ASPIRIN 81 MG/1
81 TABLET ORAL DAILY
Status: DISCONTINUED | OUTPATIENT
Start: 2019-01-07 | End: 2019-01-13

## 2019-01-07 RX ORDER — METOPROLOL TARTRATE 25 MG/1
25 TABLET, FILM COATED ORAL 2 TIMES DAILY
Status: DISCONTINUED | OUTPATIENT
Start: 2019-01-07 | End: 2019-01-10

## 2019-01-07 RX ORDER — VANCOMYCIN HYDROCHLORIDE 1 G/200ML
1000 INJECTION, SOLUTION INTRAVENOUS ONCE
Status: COMPLETED | OUTPATIENT
Start: 2019-01-07 | End: 2019-01-08

## 2019-01-07 RX ORDER — LEVALBUTEROL 1.25 MG/.5ML
1.25 SOLUTION, CONCENTRATE RESPIRATORY (INHALATION) EVERY 4 HOURS
Status: DISCONTINUED | OUTPATIENT
Start: 2019-01-07 | End: 2019-01-12

## 2019-01-07 RX ORDER — FUROSEMIDE 10 MG/ML
40 INJECTION INTRAMUSCULAR; INTRAVENOUS ONCE
Status: COMPLETED | OUTPATIENT
Start: 2019-01-07 | End: 2019-01-07

## 2019-01-07 RX ORDER — METOPROLOL TARTRATE 1 MG/ML
5 INJECTION, SOLUTION INTRAVENOUS ONCE
Status: COMPLETED | OUTPATIENT
Start: 2019-01-07 | End: 2019-01-07

## 2019-01-07 RX ORDER — PANTOPRAZOLE SODIUM 40 MG/1
40 TABLET, DELAYED RELEASE ORAL
Status: DISCONTINUED | OUTPATIENT
Start: 2019-01-07 | End: 2019-01-12

## 2019-01-07 RX ORDER — METOPROLOL TARTRATE 25 MG/1
25 TABLET, FILM COATED ORAL 2 TIMES DAILY
Status: DISCONTINUED | OUTPATIENT
Start: 2019-01-07 | End: 2019-01-07

## 2019-01-07 RX ADMIN — ALBUMIN HUMAN 12.5 G: 0.25 SOLUTION INTRAVENOUS at 02:24

## 2019-01-07 RX ADMIN — HEPARIN SODIUM 5000 UNITS: 10000 INJECTION, SOLUTION INTRAVENOUS; SUBCUTANEOUS at 12:29

## 2019-01-07 RX ADMIN — ACETAMINOPHEN 975 MG: 325 TABLET, FILM COATED ORAL at 06:54

## 2019-01-07 RX ADMIN — ACETAMINOPHEN 975 MG: 325 TABLET, FILM COATED ORAL at 00:28

## 2019-01-07 RX ADMIN — SODIUM CHLORIDE: 9 INJECTION, SOLUTION INTRAVENOUS at 08:26

## 2019-01-07 RX ADMIN — DEXTROSE AND SODIUM CHLORIDE: 5; 450 INJECTION, SOLUTION INTRAVENOUS at 12:14

## 2019-01-07 RX ADMIN — ASPIRIN 81 MG: 81 TABLET, COATED ORAL at 21:10

## 2019-01-07 RX ADMIN — AMIODARONE HYDROCHLORIDE 150 MG: 1.5 INJECTION, SOLUTION INTRAVENOUS at 17:29

## 2019-01-07 RX ADMIN — LEVALBUTEROL HYDROCHLORIDE 1.25 MG: 1.25 SOLUTION, CONCENTRATE RESPIRATORY (INHALATION) at 19:13

## 2019-01-07 RX ADMIN — Medication 8.3 MILLICURIE: at 10:46

## 2019-01-07 RX ADMIN — VANCOMYCIN HYDROCHLORIDE 1000 MG: 1 INJECTION, SOLUTION INTRAVENOUS at 23:17

## 2019-01-07 RX ADMIN — GABAPENTIN 100 MG: 100 CAPSULE ORAL at 15:47

## 2019-01-07 RX ADMIN — METOPROLOL TARTRATE 25 MG: 25 TABLET ORAL at 21:10

## 2019-01-07 RX ADMIN — AMIODARONE HYDROCHLORIDE 1 MG/MIN: 50 INJECTION, SOLUTION INTRAVENOUS at 13:25

## 2019-01-07 RX ADMIN — LEVALBUTEROL HYDROCHLORIDE 1.25 MG: 1.25 SOLUTION, CONCENTRATE RESPIRATORY (INHALATION) at 23:11

## 2019-01-07 RX ADMIN — METOPROLOL TARTRATE 5 MG: 5 INJECTION, SOLUTION INTRAVENOUS at 12:24

## 2019-01-07 RX ADMIN — ACETAMINOPHEN 975 MG: 325 TABLET, FILM COATED ORAL at 15:46

## 2019-01-07 RX ADMIN — HUMAN ALBUMIN MICROSPHERES AND PERFLUTREN 9 ML: 10; .22 INJECTION, SOLUTION INTRAVENOUS at 15:08

## 2019-01-07 RX ADMIN — LEVALBUTEROL HYDROCHLORIDE 1.25 MG: 1.25 SOLUTION, CONCENTRATE RESPIRATORY (INHALATION) at 15:28

## 2019-01-07 RX ADMIN — FUROSEMIDE 40 MG: 10 INJECTION, SOLUTION INTRAVENOUS at 21:10

## 2019-01-07 RX ADMIN — POTASSIUM CHLORIDE 20 MEQ: 400 INJECTION, SOLUTION INTRAVENOUS at 06:38

## 2019-01-07 RX ADMIN — SODIUM CHLORIDE: 9 INJECTION, SOLUTION INTRAVENOUS at 00:24

## 2019-01-07 RX ADMIN — AMIODARONE HYDROCHLORIDE 1 MG/MIN: 50 INJECTION, SOLUTION INTRAVENOUS at 18:04

## 2019-01-07 RX ADMIN — AMIODARONE HYDROCHLORIDE 150 MG: 1.5 INJECTION, SOLUTION INTRAVENOUS at 12:35

## 2019-01-07 RX ADMIN — HEPARIN SODIUM 5000 UNITS: 10000 INJECTION, SOLUTION INTRAVENOUS; SUBCUTANEOUS at 21:10

## 2019-01-07 ASSESSMENT — ACTIVITIES OF DAILY LIVING (ADL)
ADLS_ACUITY_SCORE: 17
ADLS_ACUITY_SCORE: 16
ADLS_ACUITY_SCORE: 17
ADLS_ACUITY_SCORE: 17

## 2019-01-07 ASSESSMENT — MIFFLIN-ST. JEOR: SCORE: 1335

## 2019-01-07 NOTE — OP NOTE
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    Postoperative diagnosis: Same.    Procedure: 1. Trans-right femoral aortic catheterization with visceral aortogram.  2. Primary repair of right common femoral artery access site.     Surgeon: Rigo Jennings M.D.    Anesthesia: General.    Findings: Patent celiac artery, sluggish flow identified through the superior mesenteric and both renal arteries.    Indication for procedure: This is a 78-year-old female who presented to the emergency department with acute onset of chest and abdominal pain.  She was diagnosed with a type a aortic dissection.  CT angiogram also showed that the celiac axis was perfusing.  There was no perfusion of the right renal artery and superior mesenteric artery.  There was perfusion of the left renal artery, most likely through the false lumen.  As initial part of the procedure, Dr. Winslow, had attempted to pass a Glidewire through the right common femoral artery into the aortic arch.  However, the wire would not pass easily.  This is most likely because of the compression of the true lumen and the paravesical segment.  At the completion of the arch repair I will attempt to do an arteriogram to assess visceral perfusion.  Approximate time between onset of symptoms and restoration of flow to the true lumen was about 5 hours.  Implied consent was assumed.     Procedure details: Dr. Winslow and his team had performed the repair of the ascending arch. They had done a right common femoral artery cutdown and had stay sutures placed in it. I started with accessing the right common femoral artery just above the stay sutures. A micropuncture needle and wire were placed and the wire system was converted to a 0.035\" wire system. A short 6 [place] sheath was placed. I was able to the pass the wire up the aorta and into the arch with ease.  After the patient was decannulated from the pump I " did a visceral arteriogram.     A pig tail catheter was passed into the paravisceral aorta. There was no power injector available so I had to hand inject. The celiac axis had good flow. The SMA has sluggish flow but I was able to visualize the second order branches.  The right renal artery had sluggish flow and the left renal artery was perfused with retrograde flow through the false lumen. It was not possible to put the C arm and take lateral projection pictures with risking contamination of the surgical field (chest and groins were still open at this point). Procedure was concluded.    The sheath was removed and the arterial access site was repaired with 6-0 prolene. The previous 6-0 prolene sutures placed by Dr. Winslow's team were tied down and complete access site hemostasis was achieved. There was a good pulse in the right common femoral artery.     At this point I could not say for sure that the sluggish flow in the right renal and SMA was due to persistent dynamic obstruction or due to end organ damage due to prolonged ischemia. We will have to follow the patient clinically for that and assess for ongoing renal insufficiency and mesenteric ischemia.     I discussed our findings with Dr. Winslow and Clotilde.

## 2019-01-07 NOTE — PLAN OF CARE
Patient alert, confused during time of fever spike, oriented x4 after fever improved. Lung sounds diminished. Increased respiratory rate during rigors. Starting to mobilize some secretions, pt transitioned to HiFlo NC this morning 75% (30L). Chest tube output minimal. Urine output remains low. 3 liquid brown stools. Temp max 39.9, sputum and blood culture done and antibiotics started. Required julio césar to be restarted during temp spike, off at 0640.

## 2019-01-07 NOTE — PROGRESS NOTES
Patient came back to unit after renogram and was in rapid afib, . Notified Roxana with CV Surgery. Received order for Amio bolus and infusion. Administered this and current HR 120s. Also, along with rapid afib petient dropped sats to 87-88% Discussed with Roxana and increased FIO2 on Hi-Joe to 80% from 70%. Monitoring closely.     Odette Collado RN

## 2019-01-07 NOTE — PROGRESS NOTES
Patient on BIPAP 14/8 70% throughout the shift. Alarm level set to 8. Skin intact, gel pad and mepilex applied. SpO2 in the mid 90's. Will continue to follow.    Maurice BRUNNER

## 2019-01-07 NOTE — PROGRESS NOTES
" Renal Medicine Progress Note            Assessment/Plan:   78 y.o woman with type A aortic dissection, following for ARF.     # Patient a a serum creatinine of 0.8 mg/dl on admission.     # ARF. Scr ~ 1.7-1.9 since surgery.    -IV contrast on 1/4   -dissection involved the R renal artery   -duplex showed atrophic RK and not much flow into the right kidney.      # Type A aortic dissection s/p repair. Dissected all the way down to the abdominal aortic bifurcation into the left and right common iliac arteries and involving the right renal artery.     # Anemia and thrombocytopenia. Pretty Fe def.     Plan.   # Change NS to d5 1/2 NS at 50 ml/hr  # She is getting a renogram today  # Cont to monitor kidney function and no indication dialysis  # RK is pretty atrophic  # Renal at 1800        Interval History:     Low grade fever. BP seems stable. Getting IV metoprolol q6hrs. NS is running at 100 ml/hr.  Patient denies worsening shortness of breath. Scr creatine continues to trend up, but not bad. She is + 6.7 liters since admission. 650 ml/hr fluid last 24hrs. Renal duplex showed atrophic RK and not much flow into the right kidney         Medications and Allergies:       acetaminophen  975 mg Oral Q8H     ceFEPIme (MAXIPIME) IV  2 g Intravenous Q12H     gabapentin  100 mg Oral TID     heparin  5,000 Units Subcutaneous Q8H     insulin aspart  1-6 Units Subcutaneous Q4H     metoprolol  5 mg Intravenous Q6H     pantoprazole (PROTONIX) IV  40 mg Intravenous Daily     sodium chloride (PF)  3 mL Intracatheter Q8H     vancomycin place frausto - receiving intermittent dosing  1 each Does not apply See Admin Instructions        Allergies   Allergen Reactions     Amoxicillin Swelling     Ciprofloxacin Swelling            Physical Exam:   Vitals were reviewed  Heart Rate: 90, Blood pressure 100/67, pulse 90, temperature (!) 83.7  F (28.7  C), resp. rate 20, height 1.626 m (5' 4\"), weight 86 kg (189 lb 9.5 oz), SpO2 97 %.    Wt " Readings from Last 3 Encounters:   01/07/19 86 kg (189 lb 9.5 oz)   09/14/08 80.7 kg (178 lb)       Intake/Output Summary (Last 24 hours) at 1/7/2019 0951  Last data filed at 1/7/2019 0800  Gross per 24 hour   Intake 2840.79 ml   Output 837 ml   Net 2003.79 ml       GENERAL APPEARANCE: NAD  HEENT:  Eyes/ears/nose/neck grossly normal  RESP: lungs cta b c good efforts, no crackles, rhonchi or wheezes  CV: RRR, nl S1/S2, no  m/r/g   ABDOMEN: obese, soft  EXTREMITIES/SKIN: no rashes/lesions on observed skin; trace only edema  Neuro: awake and alert  +coello catheter         Data:     CBC RESULTS:     Recent Labs   Lab 01/07/19  0430 01/06/19  0435 01/05/19  0500 01/04/19  2255 01/04/19 2012 01/04/19  1948  01/04/19  1400   WBC 8.3 10.3 8.8 11.9* 15.1*  --   --  17.7*   RBC 2.89* 3.19* 2.90* 3.32* 2.24*  --   --  4.40   HGB 8.3* 9.1* 8.2* 9.6* 6.9* 7.5*   < > 13.2   HCT 24.6* 26.6* 24.9* 28.6* 19.7*  --   --  39.7   PLT 89* 74* 106* 122* 134*  --   --  227    < > = values in this interval not displayed.       Basic Metabolic Panel:  Recent Labs   Lab 01/07/19  0430 01/06/19  1247 01/06/19  0435 01/05/19  0500 01/04/19 2255 01/04/19 2012    145* 143 143 144 145*   POTASSIUM 3.5 4.0 3.5 4.6 3.1* 3.6   CHLORIDE 112* 114* 111* 109 107 109   CO2 21 21 21 23 24 21   BUN 55* 44* 39* 25 20 17   CR 1.99* 1.71* 1.72* 1.04 0.83 0.75   * 130* 129* 167* 226* 214*   BESS 7.3* 7.5* 7.3* 7.4* 7.2* 9.2       INR  Recent Labs   Lab 01/06/19  0435 01/04/19  2255 01/04/19 2012   INR 1.46* 1.35* 2.03*      Attestation:   I have reviewed today's relevant vital signs, notes, medications, labs and imaging.    Darwin Peterson MD  [facility] - Nephrology  Office phone :[phone]  Pager: [phone]

## 2019-01-07 NOTE — PHARMACY-VANCOMYCIN DOSING SERVICE
Pharmacy Vancomycin Note  Date of Service 2019  Patient's  1940   78 year old, female    Indication: Sepsis  Goal Trough Level: 15-20 mg/L  Day of Therapy: 2  Current Vancomycin regimen:  1000 mg IV q12h x2 doses    Current estimated CrCl = Estimated Creatinine Clearance: 30.8 mL/min (A) (based on SCr of 1.71 mg/dL (H)).    Creatinine for last 3 days  2019:  2:00 PM Creatinine 0.80 mg/dL;  8:12 PM Creatinine 0.75 mg/dL; 10:55 PM Creatinine 0.83 mg/dL  2019:  5:00 AM Creatinine 1.04 mg/dL  2019:  4:35 AM Creatinine 1.72 mg/dL; 12:47 PM Creatinine 1.71 mg/dL    Recent Vancomycin Levels (past 3 days)  2019:  8:45 PM Vancomycin Level 12.1 mg/L    Vancomycin IV Administrations (past 72 hours)                   vancomycin (VANCOCIN) 1000 mg in dextrose 5% 200 mL PREMIX (mg) 1,000 mg New Bag 19 1219     1,000 mg New Bag  0054                Nephrotoxins and other renal medications (From now, onward)    Start     Dose/Rate Route Frequency Ordered Stop    19 2130  vancomycin (VANCOCIN) 1000 mg in dextrose 5% 200 mL PREMIX      1,000 mg  200 mL/hr over 1 Hours Intravenous ONCE 19 204  vancomycin place frausto - receiving intermittent dosing      1 each Does not apply SEE ADMIN INSTRUCTIONS 19 20419 2300  phenylephrine (PHOEBE-SYNEPHRINE) 50 mg in sodium chloride 0.9 % 250 mL infusion      0.5-2 mcg/kg/min × 77.1 kg  11.6-46.3 mL/hr  Intravenous CONTINUOUS 19 2253               Contrast Orders - past 72 hours (72h ago, onward)    Start     Dose/Rate Route Frequency Ordered Stop    19 1440  iopamidol (ISOVUE-370) solution 80 mL      80 mL Intravenous ONCE 19 1439 19 1442          Interpretation of levels and current regimen:  Trough level is  Subtherapeutic, but not at steady state. Also drawn 33 hours after last dose  Has serum creatinine changed > 50% in last 72 hours: Yes  Urine output:  diminished urine  output  Renal Function: Worsening    Plan:  1.   Give 1000mg (10mg/kg) IVx1 dose  2.  Pharmacy will check trough levels as appropriate in 23 hours.    3. Serum creatinine levels will be ordered daily for the first week of therapy and at least twice weekly for subsequent weeks.      Valentin Ervin        .

## 2019-01-07 NOTE — PROGRESS NOTES
Mark's test performed prior to right radial ABG draw. Collateral circulation confirmed. ABG drawn and sent to lab.

## 2019-01-07 NOTE — PROGRESS NOTES
Vascular Surgery Progress Note    Seen this am, doing well, sitting up on high flow nasal cannula, no increased work of breathing, pain controlled. + fever up to 102.9 overnight.  + loose BM,  Lactic acid 1.8.    B/P: 95/70, T: 100, P: 125, R: 23  Alert oriented no acute distress  Abdomen soft, no guarding or rigidity  Feet warm, palpable pedal pulses, motor intact.    WBC   Date Value Ref Range Status   01/07/2019 8.3 4.0 - 11.0 10e9/L Final   ]  Hemoglobin   Date Value Ref Range Status   01/07/2019 8.3 (L) 11.7 - 15.7 g/dL Final   ]  INR   Date Value Ref Range Status   01/06/2019 1.46 (H) 0.86 - 1.14 Final      Creatinine   Date Value Ref Range Status   01/07/2019 1.71 (H) 0.52 - 1.04 mg/dL Final   ]      Intake/Output Summary (Last 24 hours) at 1/7/2019 1547  Last data filed at 1/7/2019 1500  Gross per 24 hour   Intake 3363.52 ml   Output 827 ml   Net 2536.52 ml       Assessment/Plan:  78 year old female with Type A dissection s/p  Repair on 1/4/19.    Serial labs, lactate,   Wean O2 as able  Continue BP control  Barrientos strict I/O, nephrology involved. Cr improving 1.7 from 1.9  Pain management  Pulse checks and serial abdominal exams    Saige Brown MD  Vascular Surgery Fellow  Pager [phone]

## 2019-01-07 NOTE — PROGRESS NOTES
Patient remains on 30LPM HFNC, FiO2 70-80%.  Lung sounds diminished.  Xopenex nebs started.  Continue to monitor.   no clubbing/normal/no pedal edema/no cyanosis

## 2019-01-07 NOTE — PROGRESS NOTES
[facility]  Cardiovascular and Thoracic Surgery Daily Note          Assessment and Plan:   POD#3 s/p Left groin cutdown with exposure of the femoral vessels with arterial cannulation for cardiopulmonary bypass. Right groin cutdown with exposure of the femoral vessels with the right femoral artery ultimately used for diagnostic angiography. 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 Visceral angiography. Repair of the bilateral femoral vessels. By Dr. Jose Winslow    -CVS: HR: 80-100s. SBP: 90s-120s. ASA, BB. Rapid A fib today after renogram with HR into 150s. Weaned off gtts. Given IV metoprolol and amiodarone bolus and infusion started. Will rebolus if BP will tolerate. Maintain goal MAP of >60 with SBP <120  -Resp: Extubated within protocol. Saturating well on high flow. Continue to encourage IS, cough, deep breathing, ambulation.   -Neuro:  Grossly intact. Pain controlled.   -Renal: up about 9kg from preoperative weight. Cr: 1.99<1.71. Renogram today. Nephrology following. Recommend fluids.   -GI:  Tolerating ice chips. Continue bowel regimen  -:  Barrientos to remain in d/t limited mobility and need for accurate I&Os  -Endo: pre op A1c: 5.3. Completed insulin gtt per protocol. Continue SSI  -FEN: replace electrolytes as needed. Na: 143. K: 3.5  Orders Placed This Encounter      Advance Diet as Tolerated: Clear Liquid Diet    -ID: Temp (24hrs), Av.5  F (38.1  C), Min:96.6  F (35.9  C), Max:102.9  F (39.4  C)  WBC: 8.3. Completed perioperative abx. Fevers with rigors. Started on cefepime and vancomycin-- pancultured.   -Heme: Hgb: 8.3. Plt: 89 Acute blood loss anemia and thrombocytopenia   -Proph: PCD, ASA, BB, PPI, sub q heparin  -Dispo: Continue ICU cares. Rebolus amiodarone. Continue therapies as able.           Interval History:   Fevers with rigors, respiratory distress, confusion last  "evening-- but resolved this am. Patient is awake, alert and appropriate. Pain controlled.          Medications:       acetaminophen  975 mg Oral Q8H     amiodarone  150 mg Intravenous Once     [START ON 1/8/2019] ceFEPIme (MAXIPIME) IV  2 g Intravenous Q24H     gabapentin  100 mg Oral TID     heparin  5,000 Units Subcutaneous Q8H     insulin aspart  1-6 Units Subcutaneous Q4H     metoprolol  5 mg Intravenous Once     metoprolol tartrate  25 mg Oral BID     [START ON 1/8/2019] pantoprazole  40 mg Oral QAM AC     sodium chloride (PF)  3 mL Intracatheter Q8H     vancomycin place frausto - receiving intermittent dosing  1 each Does not apply See Admin Instructions     acetaminophen, IV fluid REPLACEMENT ONLY, glucose **OR** dextrose **OR** glucagon, fentaNYL, hydrALAZINE, lidocaine 4%, lidocaine (buffered or not buffered), magnesium sulfate, magnesium sulfate, meperidine, methocarbamol, naloxone, ondansetron **OR** ondansetron, oxyCODONE IR, potassium chloride, potassium chloride with lidocaine, potassium chloride, potassium chloride, potassium chloride, sodium chloride (PF), sodium phosphate, sodium phosphate, sodium phosphate, sodium phosphate          Physical Exam:   Vitals were reviewed  Blood pressure 114/71, pulse 90, temperature 100  F (37.8  C), temperature source Pulmonary Artery, resp. rate 17, height 1.626 m (5' 4\"), weight 86 kg (189 lb 9.5 oz), SpO2 96 %.  Rhythm: a fib with RVR    Lungs: coarse    Cardiovascular: irregular    Abdomen: soft NTND    Extremeties: minimal edema    Incision: CDI    CT: n/a    Weight:   Vitals:    01/04/19 1343 01/05/19 0600 01/06/19 0600 01/07/19 0600   Weight: 77.1 kg (170 lb) 84 kg (185 lb 3 oz) 84.5 kg (186 lb 4.6 oz) 86 kg (189 lb 9.5 oz)            Data:   Labs:   Lab Results   Component Value Date    WBC 8.3 01/07/2019     Lab Results   Component Value Date    RBC 2.89 01/07/2019     Lab Results   Component Value Date    HGB 8.3 01/07/2019     Lab Results   Component " Value Date    HCT 24.6 01/07/2019     No components found for: MCT  Lab Results   Component Value Date    MCV 85 01/07/2019     Lab Results   Component Value Date    MCH 28.7 01/07/2019     Lab Results   Component Value Date    MCHC 33.7 01/07/2019     Lab Results   Component Value Date    RDW 16.8 01/07/2019     Lab Results   Component Value Date    PLT 89 01/07/2019       Last Basic Metabolic Panel:  Lab Results   Component Value Date     01/07/2019      Lab Results   Component Value Date    POTASSIUM 3.5 01/07/2019     Lab Results   Component Value Date    CHLORIDE 112 01/07/2019     Lab Results   Component Value Date    BESS 7.3 01/07/2019     Lab Results   Component Value Date    CO2 21 01/07/2019     Lab Results   Component Value Date    BUN 55 01/07/2019     Lab Results   Component Value Date    CR 1.99 01/07/2019     Lab Results   Component Value Date     01/07/2019       CXR: 1/7/19    FINDINGS:  Patient is status post a midline sternotomy. Moulton-Jaqui  catheter is in place. Mild right basilar opacity. This is slightly  increased since 1/6/2019. This would be compatible with infiltrate or  atelectasis.                                                                      IMPRESSION: Slight increase in the right basilar opacity. No other  change    Roxana Carbajal PA-C  CV Surgery  Pager # [phone]

## 2019-01-07 NOTE — PROGRESS NOTES
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  She is POD#3 for aortic dissection repair with graft. Bypass time was 114min. Net I/O was 3.9L. She was admitted to ICU for ongoing care.     1/5: did well overnight.  Epi weaned off; requiring low dose phenylephrine.  Hgb stable. Extubated in the afternoon.   1/6:poor inspiratory effort and weak cough through the day.  Placed on Bipap overnight.  NTG gtt for improved BP control.  Rigors and fever to 103  1/7: Fever resolved; stable respiratory status on HFNC.  SvO2 48; getting echocardiogram.      Neuro  1. Encephalopathy; multifactorial; improved   2. Acute pain  Plan:  -- Scheduled acetaminophen,   -- Delirium prevention as able    CV  1. S/p Type A aortic dissection repair 1/4  2. HTN  3. Low SvO2/ venous oxyhemoglobin of 46  4. Rapid A Fib   Plan:  -- CV Surgery and Vascular surgery primary   --Monitor hemodynamics.  Maintain SBP<130  --Amio drip started for rapid A Fib   -- Cardiac meds per CV surgery  --Echocardiogram to help guide therapy in setting of low venous oxyhemoglobin     Resp:  1. Post operative ventilator management; resolved   2. Acute hypoxemic respiratory failure--weak cough, poor inspiratory effort, ongoing hypoxemia.    Plan:  -- HFNC as tolerated. In no respiratory distress.  BiPAP at HS if desaturating.  Does better with oxygenation while oon right side   --Start Xopenex nebs     GI/Nutrition  1. No prior hx  Plan:  --NPO for now. Ok for sips and chips   --PPI    Renal  1. IMAN; abdominal aortic dissection  involving right renal artery.  Atrophic right kidney with decreased flow. No prior hx.  Baseline creatinine appears to be 0.8;   Plan:  --Nephrology following.  Renogram 1/7.  Renal US 1/6 with flow present in both R/L renal arteries. Right kidney atrophic.    --monitor function and electrolytes as needed with replacement per ICU protocols.   -- generally avoid nephrotoxic agents such as NSAID, IV contrast unless specifically required  -- adjust medications as needed for renal clearance  -- follow I/O's as appropriate.  --volume replacement as needed; echocardiogram pending to help guide management. Had previously received lasix and had poor response.     ID  1. Fevers with rigors.   Plan:  --empiric Cefepime and Vancomycin. Vancomycin complete.  Continue Cefepime in setting of fevers.    --monitor culture data; blood cultures pending. No growth to date.   --monitor fevers and WBCs; recheck procal 1/8    Endocrine  1. Stress Hyperglycemia  Plan:  --  BG check Q 4 hours. Medium resistance sliding scale insulin     Heme:  1. Acute blood loss anemia  2. Thrombocytopenia  Plan:  -- Monitor hemoglobin. Transfuse to keep > 7.0  --Monitor platelet count; ok to give subcutaneous heparin     MSK  1. Sternotomy   Plan:  --sternal precautions     Skin  1. Surgical incisions   Plan:  --incision care per protocol     General cares:  DVT Prophylaxis: Heparin SQ  GI Prophylaxis: PPI  Restraints: Restraints for medical healing needed: NO  Family update by me today: Yes   Current lines are required for patient management  Access:  LULÚ Negrete    Time Spent on this Encounter   Billing:  I spent 45 minutes bedside and on the inpatient unit today managing the critical care of Priya DAMON Armin in relation to the issues listed in this note.    Interval History   Fevers with rigors, respiratory distress and confusion last evening. Resolved this AM.  Patient is awake, alert and appropriate.  Pain adequately controlled.  Needs  encouragement for pulmonary hygiene.   updated at bedside.      Physical Exam   Temp: 100  F (37.8  C) Temp src: Pulmonary Artery Temp  Min: 96.6  F (35.9  C)  Max: 102.9  F (39.4  C) BP: 96/73 Pulse: 129 Heart Rate: 130 Resp: 20 SpO2: 92 % O2 Device: High Flow Nasal Cannula (HFNC) Oxygen Delivery: Other (Comments)(30LPM)  Vitals:    01/05/19 0600 01/06/19 0600 01/07/19 0600   Weight: 84 kg (185 lb 3 oz) 84.5 kg (186 lb 4.6 oz) 86 kg (189 lb 9.5 oz)     I/O last 3 completed shifts:  In: 3043.56 [I.V.:2893.56]  Out: 827 [Urine:547; Chest Tube:280]    GEN: awake, alert, NAD   EYES: PERRL, 3+, Anicteric sclera.   HEENT:  Normocephalic, atraumatic  CV: irregularly irregular tachycardia, no gallops, rubs, or murmurs  PULM/CHEST: Clear breath sounds bilaterally without rhonchi, crackles or wheeze, symmetric chest rise  GI: soft, distended, non-tender.  Large volume liquid stool   : colelo catheter in place, urine yellow and clear  EXTREMITIES: 2+ peripheral edema, moving all extremities, peripheral pulses intact  NEURO: no focal deficits, VERDIN   SKIN: Surgical incisions  PSYCH:  Affect: calm and appropriate   Imaging personally reviewed: CXR   ECG: A Fib     Data   Recent Labs   Lab 01/07/19  0430 01/06/19  1247 01/06/19  0435 01/05/19  0500 01/04/19  2255 01/04/19 2012 01/04/19  1400   WBC 8.3  --  10.3 8.8 11.9* 15.1*  --  17.7*   HGB 8.3*  --  9.1* 8.2* 9.6* 6.9*   < > 13.2   MCV 85  --  83 86 86 88  --  90   PLT 89*  --  74* 106* 122* 134*  --  227   INR  --   --  1.46*  --  1.35* 2.03*  --   --     145* 143 143 144 145*   < > 137   POTASSIUM 3.5 4.0 3.5 4.6 3.1* 3.6   < > 3.4   CHLORIDE 112* 114* 111* 109 107 109  --  105   CO2 21 21 21 23 24 21  --  22   BUN 55* 44* 39* 25 20 17  --  17   CR 1.99* 1.71* 1.72* 1.04 0.83 0.75  --  0.80   ANIONGAP 10 10 11 11 13 15*  --  10   BESS 7.3* 7.5* 7.3* 7.4* 7.2* 9.2  --  8.8   * 130* 129* 167* 226* 214*  --  125*   ALBUMIN 3.0*  --   --  3.4 2.6*  --   --   3.6   PROTTOTAL 5.4*  --   --  5.2* 4.8*  --   --  6.8   BILITOTAL 1.6*  --   --  1.5* 1.1  --   --  1.3   ALKPHOS 53  --   --  43 54  --   --  92   ALT 19  --   --  75* 65*  --   --  24   AST 51*  --   --  188* 147*  --   --  27   TROPI  --   --   --   --   --   --   --  <0.015    < > = values in this interval not displayed.

## 2019-01-07 NOTE — PLAN OF CARE
Pt is alert and oriented x 4. PRN Fentanyl given for mild back pain.   Febrile 38.5. MD notified. Urine and blood cultures sent.  Tele. NSR. BP hypertensive. nitroglycerin gtt titrated. See note.  BIPAP throughout day today and small breaks with HFNC. IS encouraged.   Pt desated into the 80's and difficulty obtaining  O2 sat. MD notified. ABG drawn.   Pt tolerated ice chips while on HFNC. BM x 2. Stool incontinence.   UOP marginal 25-30 ml/hr. Nephro consulted. IVF increased and scheduled albumin. Renal US done at bedside. Renogram scheduled for tomorrow AM.   Activity dangled at bedside with physical therapy. Pt tolerated fair. Generalized weakness.   Family bedside and updated.

## 2019-01-07 NOTE — PLAN OF CARE
"CR/PT: PM attempt pt very sleepy. Arouses to name and opens eyes briefly to state \"Sure\" in question to participate in activity/exercises. Blankets removed, initiated ankle pumps and pt drifts off to sleep. Of note -130 at rest. Cancel PM PT.  "

## 2019-01-07 NOTE — PROGRESS NOTES
Patient placed on BiPAP due to SpO2 88-89% on 80% HFNC.    BiPAP settings: 14/8 90%, alarm volume level 10.  SpO2 low 90's.    Discussed with KRISTI Novoa.

## 2019-01-08 ENCOUNTER — APPOINTMENT (OUTPATIENT)
Dept: PHYSICAL THERAPY | Facility: CLINIC | Age: 79
DRG: 003 | End: 2019-01-08
Payer: COMMERCIAL

## 2019-01-08 LAB
ANION GAP SERPL CALCULATED.3IONS-SCNC: 11 MMOL/L (ref 3–14)
BUN SERPL-MCNC: 59 MG/DL (ref 7–30)
C DIFF TOX B STL QL: NEGATIVE
CALCIUM SERPL-MCNC: 7.6 MG/DL (ref 8.5–10.1)
CHLORIDE SERPL-SCNC: 111 MMOL/L (ref 94–109)
CO2 SERPL-SCNC: 21 MMOL/L (ref 20–32)
COPATH REPORT: NORMAL
CREAT SERPL-MCNC: 1.65 MG/DL (ref 0.52–1.04)
ERYTHROCYTE [DISTWIDTH] IN BLOOD BY AUTOMATED COUNT: 16.8 % (ref 10–15)
GFR SERPL CREATININE-BSD FRML MDRD: 30 ML/MIN/{1.73_M2}
GLUCOSE BLDC GLUCOMTR-MCNC: 101 MG/DL (ref 70–99)
GLUCOSE BLDC GLUCOMTR-MCNC: 102 MG/DL (ref 70–99)
GLUCOSE BLDC GLUCOMTR-MCNC: 106 MG/DL (ref 70–99)
GLUCOSE BLDC GLUCOMTR-MCNC: 123 MG/DL (ref 70–99)
GLUCOSE BLDC GLUCOMTR-MCNC: 93 MG/DL (ref 70–99)
GLUCOSE SERPL-MCNC: 99 MG/DL (ref 70–99)
GRAM STN SPEC: NORMAL
HCT VFR BLD AUTO: 25.5 % (ref 35–47)
HGB BLD-MCNC: 8.7 G/DL (ref 11.7–15.7)
INTERPRETATION ECG - MUSE: NORMAL
Lab: NORMAL
MAGNESIUM SERPL-MCNC: 2.4 MG/DL (ref 1.6–2.3)
MCH RBC QN AUTO: 28.7 PG (ref 26.5–33)
MCHC RBC AUTO-ENTMCNC: 34.1 G/DL (ref 31.5–36.5)
MCV RBC AUTO: 84 FL (ref 78–100)
PHOSPHATE SERPL-MCNC: 3 MG/DL (ref 2.5–4.5)
PLATELET # BLD AUTO: 101 10E9/L (ref 150–450)
POTASSIUM SERPL-SCNC: 3.2 MMOL/L (ref 3.4–5.3)
POTASSIUM SERPL-SCNC: 3.9 MMOL/L (ref 3.4–5.3)
RBC # BLD AUTO: 3.03 10E12/L (ref 3.8–5.2)
SODIUM SERPL-SCNC: 143 MMOL/L (ref 133–144)
SPECIMEN SOURCE: NORMAL
SPECIMEN SOURCE: NORMAL
WBC # BLD AUTO: 6.6 10E9/L (ref 4–11)

## 2019-01-08 PROCEDURE — 25000128 H RX IP 250 OP 636: Performed by: THORACIC SURGERY (CARDIOTHORACIC VASCULAR SURGERY)

## 2019-01-08 PROCEDURE — 25000125 ZZHC RX 250: Performed by: NURSE PRACTITIONER

## 2019-01-08 PROCEDURE — 25000132 ZZH RX MED GY IP 250 OP 250 PS 637: Performed by: STUDENT IN AN ORGANIZED HEALTH CARE EDUCATION/TRAINING PROGRAM

## 2019-01-08 PROCEDURE — 25000128 H RX IP 250 OP 636: Performed by: PHYSICIAN ASSISTANT

## 2019-01-08 PROCEDURE — P9041 ALBUMIN (HUMAN),5%, 50ML: HCPCS | Performed by: NURSE PRACTITIONER

## 2019-01-08 PROCEDURE — 25000125 ZZHC RX 250

## 2019-01-08 PROCEDURE — 25000132 ZZH RX MED GY IP 250 OP 250 PS 637: Performed by: INTERNAL MEDICINE

## 2019-01-08 PROCEDURE — 25000125 ZZHC RX 250: Performed by: PHYSICIAN ASSISTANT

## 2019-01-08 PROCEDURE — 93010 ELECTROCARDIOGRAM REPORT: CPT | Performed by: INTERNAL MEDICINE

## 2019-01-08 PROCEDURE — 94640 AIRWAY INHALATION TREATMENT: CPT | Mod: 76

## 2019-01-08 PROCEDURE — P9047 ALBUMIN (HUMAN), 25%, 50ML: HCPCS | Performed by: NURSE PRACTITIONER

## 2019-01-08 PROCEDURE — 25000128 H RX IP 250 OP 636: Performed by: STUDENT IN AN ORGANIZED HEALTH CARE EDUCATION/TRAINING PROGRAM

## 2019-01-08 PROCEDURE — 94640 AIRWAY INHALATION TREATMENT: CPT

## 2019-01-08 PROCEDURE — 25000132 ZZH RX MED GY IP 250 OP 250 PS 637: Performed by: PHYSICIAN ASSISTANT

## 2019-01-08 PROCEDURE — 25000128 H RX IP 250 OP 636: Performed by: NURSE PRACTITIONER

## 2019-01-08 PROCEDURE — 97110 THERAPEUTIC EXERCISES: CPT | Mod: GP | Performed by: PHYSICAL THERAPIST

## 2019-01-08 PROCEDURE — 97530 THERAPEUTIC ACTIVITIES: CPT | Mod: GP | Performed by: PHYSICAL THERAPIST

## 2019-01-08 PROCEDURE — 00000146 ZZHCL STATISTIC GLUCOSE BY METER IP

## 2019-01-08 PROCEDURE — 84100 ASSAY OF PHOSPHORUS: CPT | Performed by: PHYSICIAN ASSISTANT

## 2019-01-08 PROCEDURE — 20000003 ZZH R&B ICU

## 2019-01-08 PROCEDURE — 87493 C DIFF AMPLIFIED PROBE: CPT | Performed by: SURGERY

## 2019-01-08 PROCEDURE — 83735 ASSAY OF MAGNESIUM: CPT | Performed by: PHYSICIAN ASSISTANT

## 2019-01-08 PROCEDURE — 40000141 ZZH STATISTIC PERIPHERAL IV START W/O US GUIDANCE

## 2019-01-08 PROCEDURE — 85027 COMPLETE CBC AUTOMATED: CPT | Performed by: PHYSICIAN ASSISTANT

## 2019-01-08 PROCEDURE — 80048 BASIC METABOLIC PNL TOTAL CA: CPT | Performed by: PHYSICIAN ASSISTANT

## 2019-01-08 PROCEDURE — 40000275 ZZH STATISTIC RCP TIME EA 10 MIN

## 2019-01-08 PROCEDURE — 94660 CPAP INITIATION&MGMT: CPT

## 2019-01-08 PROCEDURE — 40000193 ZZH STATISTIC PT WARD VISIT: Performed by: PHYSICAL THERAPIST

## 2019-01-08 PROCEDURE — 84132 ASSAY OF SERUM POTASSIUM: CPT | Performed by: STUDENT IN AN ORGANIZED HEALTH CARE EDUCATION/TRAINING PROGRAM

## 2019-01-08 PROCEDURE — 99221 1ST HOSP IP/OBS SF/LOW 40: CPT | Performed by: INTERNAL MEDICINE

## 2019-01-08 PROCEDURE — 99233 SBSQ HOSP IP/OBS HIGH 50: CPT | Performed by: NURSE PRACTITIONER

## 2019-01-08 PROCEDURE — 93005 ELECTROCARDIOGRAM TRACING: CPT

## 2019-01-08 RX ORDER — METOPROLOL TARTRATE 1 MG/ML
5 INJECTION, SOLUTION INTRAVENOUS ONCE
Status: COMPLETED | OUTPATIENT
Start: 2019-01-08 | End: 2019-01-08

## 2019-01-08 RX ORDER — ALBUMIN (HUMAN) 12.5 G/50ML
12.5 SOLUTION INTRAVENOUS ONCE
Status: COMPLETED | OUTPATIENT
Start: 2019-01-08 | End: 2019-01-08

## 2019-01-08 RX ORDER — ALBUMIN, HUMAN INJ 5% 5 %
12.5 SOLUTION INTRAVENOUS ONCE
Status: COMPLETED | OUTPATIENT
Start: 2019-01-08 | End: 2019-01-08

## 2019-01-08 RX ORDER — METOPROLOL TARTRATE 1 MG/ML
INJECTION, SOLUTION INTRAVENOUS
Status: COMPLETED
Start: 2019-01-08 | End: 2019-01-08

## 2019-01-08 RX ORDER — AMIODARONE HYDROCHLORIDE 200 MG/1
400 TABLET ORAL 2 TIMES DAILY
Status: DISCONTINUED | OUTPATIENT
Start: 2019-01-08 | End: 2019-01-11

## 2019-01-08 RX ADMIN — AMIODARONE HYDROCHLORIDE 0.5 MG/MIN: 50 INJECTION, SOLUTION INTRAVENOUS at 05:50

## 2019-01-08 RX ADMIN — SODIUM CHLORIDE, POTASSIUM CHLORIDE, SODIUM LACTATE AND CALCIUM CHLORIDE 250 ML: 600; 310; 30; 20 INJECTION, SOLUTION INTRAVENOUS at 07:51

## 2019-01-08 RX ADMIN — ASPIRIN 81 MG: 81 TABLET, COATED ORAL at 21:18

## 2019-01-08 RX ADMIN — HEPARIN SODIUM 5000 UNITS: 10000 INJECTION, SOLUTION INTRAVENOUS; SUBCUTANEOUS at 05:03

## 2019-01-08 RX ADMIN — HEPARIN SODIUM 5000 UNITS: 10000 INJECTION, SOLUTION INTRAVENOUS; SUBCUTANEOUS at 21:18

## 2019-01-08 RX ADMIN — LEVALBUTEROL HYDROCHLORIDE 1.25 MG: 1.25 SOLUTION, CONCENTRATE RESPIRATORY (INHALATION) at 03:45

## 2019-01-08 RX ADMIN — POTASSIUM CHLORIDE 20 MEQ: 400 INJECTION, SOLUTION INTRAVENOUS at 06:36

## 2019-01-08 RX ADMIN — METOPROLOL TARTRATE 5 MG: 5 INJECTION, SOLUTION INTRAVENOUS at 17:09

## 2019-01-08 RX ADMIN — METOPROLOL TARTRATE 25 MG: 25 TABLET ORAL at 08:09

## 2019-01-08 RX ADMIN — POTASSIUM CHLORIDE 20 MEQ: 1500 TABLET, EXTENDED RELEASE ORAL at 08:09

## 2019-01-08 RX ADMIN — POTASSIUM CHLORIDE 20 MEQ: 1500 TABLET, EXTENDED RELEASE ORAL at 14:42

## 2019-01-08 RX ADMIN — LEVALBUTEROL HYDROCHLORIDE 1.25 MG: 1.25 SOLUTION, CONCENTRATE RESPIRATORY (INHALATION) at 11:35

## 2019-01-08 RX ADMIN — ALBUMIN HUMAN 12.5 G: 0.05 INJECTION, SOLUTION INTRAVENOUS at 16:26

## 2019-01-08 RX ADMIN — LEVALBUTEROL HYDROCHLORIDE 1.25 MG: 1.25 SOLUTION, CONCENTRATE RESPIRATORY (INHALATION) at 23:32

## 2019-01-08 RX ADMIN — METOPROLOL TARTRATE 5 MG: 1 INJECTION, SOLUTION INTRAVENOUS at 07:52

## 2019-01-08 RX ADMIN — HEPARIN SODIUM 5000 UNITS: 10000 INJECTION, SOLUTION INTRAVENOUS; SUBCUTANEOUS at 12:37

## 2019-01-08 RX ADMIN — LEVALBUTEROL HYDROCHLORIDE 1.25 MG: 1.25 SOLUTION, CONCENTRATE RESPIRATORY (INHALATION) at 19:44

## 2019-01-08 RX ADMIN — CEFEPIME HYDROCHLORIDE 2 G: 2 INJECTION, POWDER, FOR SOLUTION INTRAVENOUS at 09:44

## 2019-01-08 RX ADMIN — PANTOPRAZOLE SODIUM 40 MG: 40 TABLET, DELAYED RELEASE ORAL at 08:10

## 2019-01-08 RX ADMIN — OXYCODONE HYDROCHLORIDE 5 MG: 5 TABLET ORAL at 18:31

## 2019-01-08 RX ADMIN — ALBUMIN HUMAN 12.5 G: 0.25 SOLUTION INTRAVENOUS at 15:39

## 2019-01-08 RX ADMIN — METOPROLOL TARTRATE 25 MG: 25 TABLET ORAL at 21:18

## 2019-01-08 RX ADMIN — LEVALBUTEROL HYDROCHLORIDE 1.25 MG: 1.25 SOLUTION, CONCENTRATE RESPIRATORY (INHALATION) at 07:24

## 2019-01-08 RX ADMIN — LEVALBUTEROL HYDROCHLORIDE 1.25 MG: 1.25 SOLUTION, CONCENTRATE RESPIRATORY (INHALATION) at 15:49

## 2019-01-08 RX ADMIN — METOPROLOL TARTRATE 5 MG: 5 INJECTION, SOLUTION INTRAVENOUS at 07:52

## 2019-01-08 RX ADMIN — SODIUM CHLORIDE, POTASSIUM CHLORIDE, SODIUM LACTATE AND CALCIUM CHLORIDE 500 ML: 600; 310; 30; 20 INJECTION, SOLUTION INTRAVENOUS at 10:26

## 2019-01-08 RX ADMIN — AMIODARONE HYDROCHLORIDE 400 MG: 200 TABLET ORAL at 21:18

## 2019-01-08 RX ADMIN — CEFEPIME HYDROCHLORIDE 2 G: 2 INJECTION, POWDER, FOR SOLUTION INTRAVENOUS at 20:17

## 2019-01-08 ASSESSMENT — ACTIVITIES OF DAILY LIVING (ADL)
ADLS_ACUITY_SCORE: 16

## 2019-01-08 ASSESSMENT — MIFFLIN-ST. JEOR: SCORE: 1344

## 2019-01-08 NOTE — PROGRESS NOTES
I have been following for medicine progress very closely.  I have seen the patient daily since after her initial surgery on 4 January 2019.  I have the opportunity to discuss the case with the patient's family as well as Dr. Mabry.  Since her extubation she has not complained of any abdominal pain.  Her abdomen is soft.  Her renal function did show a decline as was expected.  Ultrasonography of the renal arteries showed decreased flow to both kidneys and a shrunken atrophic right kidney.  Given the clinical scenario this was expected.  Nuclear medicine renal perfusion study has confirmed those findings.  Her renal function is actually improved today.  She also started to have diarrhea which is most likely due to the setting of intestinal lining given the prolonged ischemia.  Nevertheless, her having been in the hospital and in the intensive care unit and having required antibiotics it would be worthwhile to rule out a Clostridium difficile superinfection.  I would also like for her to start a diet which is high and glutamine to restore intestinal lining.  A consult for dietitian has been placed for the same.      So far as the status of the renal perfusion is concerned, I would continue to advocate conservative approach.  I would not recommend a CT angiogram at this time as it is unlikely change the management and will worsen her renal function.  Her mediastinal and chest tubes have been removed and once those sites are less painful we will get a formal abdominal duplex sonogram to look at the celiac and the superior mesenteric artery.  Again I discussed all this with the patient and family, patient herself and Dr. Mabry from cardiac surgery.

## 2019-01-08 NOTE — PROGRESS NOTES
Patient went back into rapid a-fib, HR 130s. Notified CV surgery. Received order to give one time dose of IV Metoprolol. Will administer and monitor closely.    Odette Collado RN

## 2019-01-08 NOTE — PROGRESS NOTES
Vascular Surgery Progress Note    Overall she feels she is doing well, requesting liquids  mild abdominal bloating per patient.  Rectal tube placed for liquid stool.  Antibiotics initiated  Remains tachycardic with HR in 120-140s this am.  Remains on amiodarone  IVF stopped and given dose of lasix yesterday per nephrology    B/P: 112/68, T: 100.4, P: 138, R: 24  Alert oriented no acute distress  Abd soft, mild distention.  No significant tenderness to palpation, no guarding or rigidity  Moves lower extremities, minimal edema.  Feet warm  Palp DP bilaterally    WBC   Date Value Ref Range Status   01/08/2019 6.6 4.0 - 11.0 10e9/L Final   ]  Hemoglobin   Date Value Ref Range Status   01/08/2019 8.7 (L) 11.7 - 15.7 g/dL Final   ]  INR   Date Value Ref Range Status   01/06/2019 1.46 (H) 0.86 - 1.14 Final      Creatinine   Date Value Ref Range Status   01/08/2019 1.65 (H) 0.52 - 1.04 mg/dL Final   ]      Intake/Output Summary (Last 24 hours) at 1/8/2019 0752  Last data filed at 1/8/2019 0600  Gross per 24 hour   Intake 2301.33 ml   Output 1470 ml   Net 831.33 ml       Assessment/Plan:  78 year old female S/p type A dissection repair on 1/4/19    Continue BP and HR control  Barrientos strict I/O   Sputum and blood cultures negative thus far.  Fever may be result of TEVAR graft placement particularly in setting of no significant leukocytosis.  Monitor labs and vitals.  Pulse checks, PT up with assistance as or when able due to hemodynamics.  Clear liquid diet ordered.  Would go slow.    Saige Brown MD  Vascular Surgery Fellow  Pager [phone]

## 2019-01-08 NOTE — CONSULTS
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 does not have a product with glutamine either    Nutrition manager at [facility] is in the process of locating a glutamine containing oral supplement (as we do not have anything on formulary) - will likely be EDI    EDI = 7gm glutamine per serving  (recommendation is for 2 servings per day)    Will continue to follow for ability to supply \"high glutamine diet\" - oral supplement    "

## 2019-01-08 NOTE — PROGRESS NOTES
Renal Medicine Progress Note            Assessment/Plan:     78 y.o woman with type A aortic dissection, following for ARF.      # Patient with a serum creatinine of 0.8 mg/dl on admission.      # ARF. Scr ~ 1.7-1.9 since surgery. Stable.                -IV contrast on 1/4               -dissection involved the R renal artery               -duplex showed atrophic RK and not much flow into the right kidney and renogram showed 34% function on the RK and 66% function on the left kidney                        # Type A aortic dissection s/p repair. Dissected all the way down to the abdominal aortic bifurcation into the left and right common iliac arteries.     # Anemia and thrombocytopenia. Pretty Fe def.     #Atria fibrillation. Still in RVR.  On metoprolol and amiodarone. BP is soft.     Plan.   # If weight and I/O are accurate, her weight is up ~ 10 kg and she is +7.6 liters since admission. She is requiring more O2. Blood pressure is soft. I think this may be due to amiodarone. I would recommend diuresing, but she received 500 ml of LR this morning.   # Primary team to consult cardiology   # Avoid IV contrast        Interval History:     Febrile. BP is soft. Patient received 500 ml of LR this morning. Patient is needing more O2. She feels about the same as yesterday. She says breathing is more difficult at time.           Medications and Allergies:       aspirin  81 mg Oral Daily     ceFEPIme (MAXIPIME) IV  2 g Intravenous Q12H     heparin  5,000 Units Subcutaneous Q8H     insulin aspart  1-6 Units Subcutaneous Q4H     lactated ringers  500 mL Intravenous Once     levalbuterol  1.25 mg Nebulization Q4H     metoprolol tartrate  25 mg Oral BID     pantoprazole  40 mg Oral QAM AC     sodium chloride (PF)  3 mL Intracatheter Q8H     vancomycin place frausto - receiving intermittent dosing  1 each Does not apply See Admin Instructions        Allergies   Allergen Reactions     Amoxicillin Swelling     Ciprofloxacin  "Swelling            Physical Exam:   Vitals were reviewed  Heart Rate: 128, Blood pressure 99/65, pulse 125, temperature 100.4  F (38  C), resp. rate (!) 31, height 1.626 m (5' 4\"), weight 86.9 kg (191 lb 9.3 oz), SpO2 95 %.    Wt Readings from Last 3 Encounters:   01/08/19 86.9 kg (191 lb 9.3 oz)   09/14/08 80.7 kg (178 lb)       Intake/Output Summary (Last 24 hours) at 1/8/2019 1004  Last data filed at 1/8/2019 0800  Gross per 24 hour   Intake 1861.33 ml   Output 1365 ml   Net 496.33 ml       GENERAL APPEARANCE: pleasant, NAD, alert  HEENT:  Eyes/ears/nose/neck grossly normal  RESP: lungs cta b c good efforts, no crackles, rhonchi or wheezes  CV: RRR, tachy.  ABDOMEN: obese, soft, NT  EXTREMITIES/SKIN: no rashes/lesions on observed skin; not much edema in the legs and thighs.   Neuro: a/o x 3         Data:     CBC RESULTS:     Recent Labs   Lab 01/08/19  0420 01/07/19  0430 01/06/19  0435 01/05/19  0500 01/04/19  2255 01/04/19 2012   WBC 6.6 8.3 10.3 8.8 11.9* 15.1*   RBC 3.03* 2.89* 3.19* 2.90* 3.32* 2.24*   HGB 8.7* 8.3* 9.1* 8.2* 9.6* 6.9*   HCT 25.5* 24.6* 26.6* 24.9* 28.6* 19.7*   * 89* 74* 106* 122* 134*       Basic Metabolic Panel:  Recent Labs   Lab 01/08/19  0420 01/07/19  1422 01/07/19  0430 01/06/19  1247 01/06/19  0435 01/05/19  0500    143 143 145* 143 143   POTASSIUM 3.2* 3.7 3.5 4.0 3.5 4.6   CHLORIDE 111* 115* 112* 114* 111* 109   CO2 21 20 21 21 21 23   BUN 59* 54* 55* 44* 39* 25   CR 1.65* 1.71* 1.99* 1.71* 1.72* 1.04   GLC 99 119* 106* 130* 129* 167*   BESS 7.6* 7.4* 7.3* 7.5* 7.3* 7.4*       INR  Recent Labs   Lab 01/06/19  0435 01/04/19  2255 01/04/19 2012   INR 1.46* 1.35* 2.03*      Attestation:   I have reviewed today's relevant vital signs, notes, medications, labs and imaging.    Darwin Peterson MD  [facility] - Nephrology  Office phone :[phone]  Pager: [phone]  "

## 2019-01-08 NOTE — PROGRESS NOTES
[facility]  Critical Care Service  Progress Note  Date of Service (when I saw the patient): 01/08/2019     Main Plans for Today    IV metoprolol x 1 for rate control   Check mag and phos  Fluid bolus; repeat if necessary effective   Advance diet as tolerated   Up to chair   Check for C. Diff; negative     Assessment & Plan   Priya Funez is a 78 year old female with PMH of HTN admitted on 1/4/2019 with sudden onset of back pain found to have type A aortic dissection and sent for emergency repair.  She is POD#3 for aortic dissection repair with graft. Bypass time was 114min. Net I/O was 3.9L. She was admitted to ICU for ongoing care.     1/5: did well overnight.  Epi weaned off; requiring low dose phenylephrine.  Hgb stable. Extubated in the afternoon.   1/6:poor inspiratory effort and weak cough through the day.  Placed on Bipap overnight.  NTG gtt for improved BP control.  Rigors and fever to 103  1/7: Fever resolved; stable respiratory status on HFNC.  SvO2 48; getting echocardiogram.  Normal cardiac output; EF 55-60%     Neuro  1. Encephalopathy; multifactorial; improved   2. Acute pain  Plan:  -- PRN acetaminophen, fentanyl, robaxin and oxycodone    -- Delirium prevention as able    CV  1. S/p Type A aortic dissection repair 1/4  2. HTN  3. Low SvO2/ venous oxyhemoglobin of 46  4. Rapid A Fib   Plan:  --CV Surgery and Vascular surgery primary   --Monitor hemodynamics.  Maintain SBP<130  --Amio drip started for rapid A Fib; bolus x 2 without significant improvement.  Converted this afternoon to SR    --Cardiac meds per CV surgery  --Echocardiogram 1/7; EF 50-55%; IVC not dilated, ventricles small     Resp:  1. Post operative ventilator management; resolved   2. Acute hypoxemic respiratory failure--weak cough, poor inspiratory effort, ongoing hypoxemia.    Plan:  -- HFNC as tolerated. In no respiratory distress.  BiPAP at HS if desaturating.  Does better with oxygenation while oon right side    --Continue Xopenex nebs   --IS hourly while awake as able, up to chair     GI/Nutrition  1. No prior hx  Plan:  --Advance diet as tolerated    --PPI    Renal  1. IMAN; abdominal aortic dissection involving right renal artery.  Atrophic right kidney with decreased flow. No prior hx.  Baseline creatinine appears to be 0.8; renal function improving daily   Plan:  --Nephrology following.  Renogram 1/7.  Renal US 1/6 with flow present in both R/L renal arteries. Right kidney atrophic.    --monitor function and electrolytes as needed with replacement per ICU protocols.   -- generally avoid nephrotoxic agents such as NSAID, IV contrast unless specifically required  -- adjust medications as needed for renal clearance  -- follow I/O's as appropriate.  --volume replacement as needed; received 750mL in LR bolus this AM for low urine output. Had previously received lasix and had poor response.     ID  1. Fevers with rigors.   Plan:  --empiric Cefepime and Vancomycin. Vancomycin complete.  Continue Cefepime in setting of fevers.    --monitor culture data; blood cultures pending. No growth to date.   --monitor fevers and WBCs; recheck procal 1/9    Endocrine  1. Stress Hyperglycemia; improved   Plan:  --  BG check Q 4 hours. Medium resistance sliding scale insulin     Heme:  1. Acute blood loss anemia  2. Thrombocytopenia  Plan:  -- Monitor hemoglobin. Transfuse to keep > 7.0  --Monitor platelet count; ok to give subcutaneous heparin     MSK  1. Sternotomy   Plan:  --sternal precautions     Skin  1. Surgical incisions   Plan:  --incision care per protocol     General cares:  DVT Prophylaxis: Heparin SQ  GI Prophylaxis: PPI  Restraints: Restraints for medical healing needed: NO  Family update by me today: Yes   Current lines are required for patient management  Access:  LULÚ Negrete    Time Spent on this Encounter   Billing:  I spent 45 minutes bedside and on the inpatient unit today managing the critical care of Priya  MARISOL Funez in relation to the issues listed in this note.    Interval History   Slightly improved respiratory status.  Breathing comfortably on HFNC.  Converted from rapid A fib to SR. Wants to start eating.       Physical Exam   Temp: 100.4  F (38  C) Temp src: Bladder Temp  Min: 98.7  F (37.1  C)  Max: 100.8  F (38.2  C) BP: 112/68 Pulse: 138 Heart Rate: 141 Resp: 24 SpO2: 96 % O2 Device: High Flow Nasal Cannula (HFNC) Oxygen Delivery: Other (Comments)(30LPM)  Vitals:    01/06/19 0600 01/07/19 0600 01/08/19 0000   Weight: 84.5 kg (186 lb 4.6 oz) 86 kg (189 lb 9.5 oz) 86.9 kg (191 lb 9.3 oz)     I/O last 3 completed shifts:  In: 2301.33 [P.O.:50; I.V.:2251.33]  Out: 1470 [Urine:1360; Chest Tube:110]    GEN: awake, alert, NAD   EYES: PERRL, 3+, Anicteric sclera.   HEENT:  Normocephalic, atraumatic  CV: irregularly irregular tachycardia, no gallops, rubs, or murmurs  PULM/CHEST: Clear breath sounds bilaterally without rhonchi, crackles or wheeze, symmetric chest rise  GI: soft, distended, non-tender.  Large volume liquid stool   : coello catheter in place, urine yellow and clear  EXTREMITIES: No peripheral edema, moving all extremities, peripheral pulses intact  NEURO: no focal deficits, VERDIN   SKIN: Surgical incisions  PSYCH:  Affect: calm and appropriate   Imaging personally reviewed: CXR   ECG: A Fib; converted to SR      Data   Recent Labs   Lab 01/08/19  0420 01/07/19  1422 01/07/19  0430  01/06/19  0435 01/05/19  0500 01/04/19  2255 01/04/19 2012 01/04/19  1400   WBC 6.6  --  8.3  --  10.3 8.8 11.9* 15.1*  --  17.7*   HGB 8.7*  --  8.3*  --  9.1* 8.2* 9.6* 6.9*   < > 13.2   MCV 84  --  85  --  83 86 86 88  --  90   *  --  89*  --  74* 106* 122* 134*  --  227   INR  --   --   --   --  1.46*  --  1.35* 2.03*  --   --     143 143   < > 143 143 144 145*   < > 137   POTASSIUM 3.2* 3.7 3.5   < > 3.5 4.6 3.1* 3.6   < > 3.4   CHLORIDE 111* 115* 112*   < > 111* 109 107 109  --  105   CO2 21 20 21   < > 21  23 24 21  --  22   BUN 59* 54* 55*   < > 39* 25 20 17  --  17   CR 1.65* 1.71* 1.99*   < > 1.72* 1.04 0.83 0.75  --  0.80   ANIONGAP 11 8 10   < > 11 11 13 15*  --  10   BESS 7.6* 7.4* 7.3*   < > 7.3* 7.4* 7.2* 9.2  --  8.8   GLC 99 119* 106*   < > 129* 167* 226* 214*  --  125*   ALBUMIN  --  2.8* 3.0*  --   --  3.4 2.6*  --   --  3.6   PROTTOTAL  --   --  5.4*  --   --  5.2* 4.8*  --   --  6.8   BILITOTAL  --   --  1.6*  --   --  1.5* 1.1  --   --  1.3   ALKPHOS  --   --  53  --   --  43 54  --   --  92   ALT  --   --  19  --   --  75* 65*  --   --  24   AST  --   --  51*  --   --  188* 147*  --   --  27   TROPI  --   --   --   --   --   --   --   --   --  <0.015    < > = values in this interval not displayed.

## 2019-01-08 NOTE — PLAN OF CARE
Discharge Planner PT   Patient plan for discharge: Rehab of some sort. Per pt and family.  Current status: -130 in supine at rest and with gentle LE ROM. BP soft 80s/40s; therefore, no OOB. Dip in O2 to 87% with activity, 2 min to recover to 92%. On HighFlow NC. Dicussed POC and recommendation of Acute Rehab with pt and family. Family would like to tour various options, also enquired about TCU. [facility]. Family eager to talk to SW or CC to begin to set a plan, when able.    PM session: HR 80s-100s, BP with activity 119/80. Fatigued, but participated in sup>sit max A. Sit<>stand x 2 Mod A x 2. Not yet ready to take steps as pt very dependent on the bed for LE support. Universal sling bed>chair. O2 98% up in chair.  Barriers to return to prior living situation: Level of assist, fatigue, impaired activity tolerance and strength.   Recommendations for discharge: Acute Rehab.  Rationale for recommendations: Pt will benefit from Continued skilled rehab services during LOS as well as an intense level of rehab at discharge, in order to assist pt with return to PLOF. She is motivated and has a great support system with family. , daughter and a son at bedside during this session today.        Entered by: Na Nassar 01/08/2019 11:03 AM

## 2019-01-08 NOTE — CONSULTS
"Electrophysiology/ Cardiology- Consult Note         H&P and Plan:     Reason for consult: A. fib with RVR.    History of present illness: Ms. Funez is a pleasant 78-year-old lady with a history of hypertension, who presented with ascending aortic dissection (December 4).  She underwent aortic repair (January 5) and postprocedure was complicated by acute renal failure and A. fib with RVR.  EP was consulted to help with management.    Patient course with A. fib with RVR after surgery.  She was a started on a combination of metoprolol and amiodarone, and heart rates have been difficult to control despite of medical therapy.  Due to borderline low blood pressure levels, to physiology service was consult.    At my arrival, patient converted back into normal rhythm.  She is currently hemodynamic stable and in no acute distress.  Telemetry is compatible with normal sinus rhythm.  Previous EKG reviewed atypical flutter/coarse A. Fib.  Echo done yesterday revealed normal cardiac function.    Plan:    1.  Atrial fibrillation.  She is back to normal Rythmol.  She denies any history of A. fib in the past.  Therefore it is possible that A. fib was related to surgical procedure.  I recommend the following:  - Repeat ECG  - Switching amiodarone to oral (Amiodarone 400 mg PO BID for a day, then decrease to 400 mg PO daily for 4 days, and then decrease to 200 mg PO daily).    - Keep amiodarone for a month.  - Follow up in EP clinic with an RICH in a month to reevaluate need for chronic use of antiarrhythmic therapy.    2.  Embolic prevention.  Chads vascular score of 3.  I favor initiation of anticoagulation.  I live to the surgical team to decide when to safe to initiate anticoagulation after surgery.    Please contact his increase of questions.      Jose Alberto Yusuf MD    Physical Exam:  Vitals: BP (!) 84/61   Pulse 126   Temp 100.9  F (38.3  C)   Resp 27   Ht 1.626 m (5' 4\")   Wt 86.9 kg (191 lb 9.3 oz)   SpO2 94%   BMI " 32.88 kg/m        Intake/Output Summary (Last 24 hours) at 1/8/2019 1313  Last data filed at 1/8/2019 1200  Gross per 24 hour   Intake 1721.33 ml   Output 1345 ml   Net 376.33 ml     Vitals:    01/04/19 1343 01/05/19 0600 01/06/19 0600 01/07/19 0600   Weight: 77.1 kg (170 lb) 84 kg (185 lb 3 oz) 84.5 kg (186 lb 4.6 oz) 86 kg (189 lb 9.5 oz)    01/08/19 0000   Weight: 86.9 kg (191 lb 9.3 oz)       Constitutional:  AAO x3.  Pt is in NAD.  HEAD: Normocephalic.  SKIN: Skin normal color, texture and turgor with no lesions or eruptions.  Eyes: PERRL, EOMI.  ENT:  Supple, normal JVP. No lymphadenopathy or thyroid enlargement.  Chest:  Decrease breath sound in base B/L.  Cardiac:  RRR, normal  S1 and S2.  No murmurs rubs or gallop.    Abdomen:  Normal BS.  Soft, non-tender and non-distended.  No rebound or guarding.    Extremities:  Pedious pulses palpable B/L.  No LE edema noticed.   Neurological: Strength and sensation grossly symmetric and intact throughout.         Review of Systems:  Complete review of system is otherwise negative with the exception of what was described above.     CURRENT MEDICATIONS:    aspirin  81 mg Oral Daily     ceFEPIme (MAXIPIME) IV  2 g Intravenous Q12H     heparin  5,000 Units Subcutaneous Q8H     insulin aspart  1-6 Units Subcutaneous Q4H     levalbuterol  1.25 mg Nebulization Q4H     metoprolol tartrate  25 mg Oral BID     pantoprazole  40 mg Oral QAM AC     sodium chloride (PF)  3 mL Intracatheter Q8H     vancomycin place frausto - receiving intermittent dosing  1 each Does not apply See Admin Instructions     PRN Meds: acetaminophen, IV fluid REPLACEMENT ONLY, glucose **OR** dextrose **OR** glucagon, fentaNYL, hydrALAZINE, lidocaine 4%, lidocaine (buffered or not buffered), magnesium sulfate, magnesium sulfate, meperidine, methocarbamol, naloxone, ondansetron **OR** ondansetron, oxyCODONE IR, potassium chloride, potassium chloride with lidocaine, potassium chloride, potassium chloride,  potassium chloride, sodium chloride (PF), sodium phosphate, sodium phosphate, sodium phosphate, sodium phosphate    ALLERGIES     Allergies   Allergen Reactions     Amoxicillin Swelling     Ciprofloxacin Swelling       PAST MEDICAL HISTORY:  Past Medical History:   Diagnosis Date     Hypertension        PAST SURGICAL HISTORY:  Past Surgical History:   Procedure Laterality Date     ANGIOGRAM Right 1/4/2019    Procedure: DIAGONSTIC ANGIOGRAM OF SMA;  Surgeon: Rigo Jennings MD;  Location:  OR     BYPASS GRAFT ARTERY CORONARY, REPAIR VALVE AORTIC, COMBINED N/A 1/4/2019    Procedure: AORTIC DISSECTION REPAIR WITH GRAFT- HEMASHIELD PLATINUM WOVEN DOUBLE VELOR 4 SIDE ARM VASCULAR GRAFT, D:35 X 12 X 10 X 10MM/ L:50CM;  Surgeon: Jose Winslow MD;  Location:  OR       FAMILY HISTORY:  No family history on file.    SOCIAL HISTORY:  Social History     Socioeconomic History     Marital status:      Spouse name: Not on file     Number of children: Not on file     Years of education: Not on file     Highest education level: Not on file   Social Needs     Financial resource strain: Not on file     Food insecurity - worry: Not on file     Food insecurity - inability: Not on file     Transportation needs - medical: Not on file     Transportation needs - non-medical: Not on file   Occupational History     Not on file   Tobacco Use     Smoking status: Never Smoker     Smokeless tobacco: Never Used     Tobacco comment: hisband   Substance and Sexual Activity     Alcohol use: No     Frequency: Never     Drug use: No     Sexual activity: Not on file   Other Topics Concern     Not on file   Social History Narrative     Not on file         Recent Lab Results:  Recent Labs   Lab 01/08/19  1149 01/08/19  0420 01/07/19  1422 01/07/19  0430  01/06/19  0435 01/05/19  0500 01/04/19  2255 01/04/19 2012 01/04/19  1400   WBC  --  6.6  --  8.3  --  10.3 8.8 11.9* 15.1*  --  17.7*   HGB  --  8.7*  --  8.3*  --  9.1*  8.2* 9.6* 6.9*   < > 13.2   MCV  --  84  --  85  --  83 86 86 88  --  90   PLT  --  101*  --  89*  --  74* 106* 122* 134*  --  227   INR  --   --   --   --   --  1.46*  --  1.35* 2.03*  --   --    NA  --  143 143 143   < > 143 143 144 145*   < > 137   POTASSIUM 3.9 3.2* 3.7 3.5   < > 3.5 4.6 3.1* 3.6   < > 3.4   CHLORIDE  --  111* 115* 112*   < > 111* 109 107 109  --  105   CO2  --  21 20 21   < > 21 23 24 21  --  22   BUN  --  59* 54* 55*   < > 39* 25 20 17  --  17   CR  --  1.65* 1.71* 1.99*   < > 1.72* 1.04 0.83 0.75  --  0.80   ANIONGAP  --  11 8 10   < > 11 11 13 15*  --  10   BESS  --  7.6* 7.4* 7.3*   < > 7.3* 7.4* 7.2* 9.2  --  8.8   GLC  --  99 119* 106*   < > 129* 167* 226* 214*  --  125*   ALBUMIN  --   --  2.8* 3.0*  --   --  3.4 2.6*  --   --  3.6   PROTTOTAL  --   --   --  5.4*  --   --  5.2* 4.8*  --   --  6.8   BILITOTAL  --   --   --  1.6*  --   --  1.5* 1.1  --   --  1.3   ALKPHOS  --   --   --  53  --   --  43 54  --   --  92   ALT  --   --   --  19  --   --  75* 65*  --   --  24   AST  --   --   --  51*  --   --  188* 147*  --   --  27   LIPASE  --   --   --   --   --   --   --   --   --   --  145   TROPI  --   --   --   --   --   --   --   --   --   --  <0.015    < > = values in this interval not displayed.

## 2019-01-08 NOTE — PLAN OF CARE
Patient denies pain. Started on amio drip this shift for rapid afib. BP stable after two loading doses of amio. Requiring more O2, currently on BIPAP. ECHO done this shift. Renogram done this shift. Chest tubes out this shift. Multiple watery stools throughout shift. See other documentation.    Odette Collado RN

## 2019-01-08 NOTE — PROGRESS NOTES
[facility]  Cardiovascular and Thoracic Surgery Daily Note          Assessment and Plan:   POD#4 s/p Left groin cutdown with exposure of the femoral vessels with arterial cannulation for cardiopulmonary bypass. Right groin cutdown with exposure of the femoral vessels with the right femoral artery ultimately used for diagnostic angiography. 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 Visceral angiography. Repair of the bilateral femoral vessels. By Dr. Jose Wnislow  -CVS: HR: 120s-140s. SBP: 90s-100s. ASA, BB. Rapid a fib converted to NSR this afternoon. Weaned off gtts. Echo  EP evaluated and transitioned to 400mg BID today.   -Resp: Extubated within protocol. Saturating well on high flow. Continue to encourage IS, cough, deep breathing, ambulation  -Neuro:  Grossly intact. Pain controlled.   -Renal: up about 9kg from preoperative weight. Cr: 1.65<1.99<1.71. 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 Renogram yesterday-- atrophic right kidney. Nephrology following. Given fluid per intensivist.   -GI:  Tolerating clears. Continue bowel regimen  -:  Barrientos to remain in d/t limited mobility and need for accurate I&Os.   -Endo: pre op a1c: 5.3. Completed insulin gtt per protocol. Continue sliding scale insulin.   -FEN: replace electrolyes as needed. Na: 143. K: 3.2. Replace K+  -ID: Temp (24hrs), Av.5  F (38.1  C), Min:98.7  F (37.1  C), Max:101.3  F (38.5  C)  WBC: 6.6. Completing perioperative abx.   -Heme: Hgb: 8.7. Plt: 101. Acute blood loss anemia and thrombocytopenia related to surgery  -Proph: PCD, ASA, BB, statin, PPI, sub q heparin  -Dispo: Continue ICU cares. Transition IV amio to oral. Continue therapies. Continue to encourage IS, cough, deep breathing. Plan to dangle at side of bed today.           Interval History:   Rapid Afib overnight. Converted to NSR this afternoon.  "Saturating well on high flow. Pain controlled. Tolerating clears.          Medications:       amiodarone  400 mg Oral BID     aspirin  81 mg Oral Daily     ceFEPIme (MAXIPIME) IV  2 g Intravenous Q12H     heparin  5,000 Units Subcutaneous Q8H     insulin aspart  1-6 Units Subcutaneous Q4H     levalbuterol  1.25 mg Nebulization Q4H     metoprolol tartrate  25 mg Oral BID     pantoprazole  40 mg Oral QAM AC     sodium chloride (PF)  3 mL Intracatheter Q8H     vancomycin place frausto - receiving intermittent dosing  1 each Does not apply See Admin Instructions     acetaminophen, IV fluid REPLACEMENT ONLY, glucose **OR** dextrose **OR** glucagon, fentaNYL, hydrALAZINE, lidocaine 4%, lidocaine (buffered or not buffered), magnesium sulfate, magnesium sulfate, meperidine, methocarbamol, naloxone, ondansetron **OR** ondansetron, oxyCODONE IR, potassium chloride, potassium chloride with lidocaine, potassium chloride, potassium chloride, potassium chloride, sodium chloride (PF), sodium phosphate, sodium phosphate, sodium phosphate, sodium phosphate          Physical Exam:   Vitals were reviewed  Blood pressure 97/50, pulse 89, temperature 99.1  F (37.3  C), resp. rate 23, height 1.626 m (5' 4\"), weight 86.9 kg (191 lb 9.3 oz), SpO2 96 %.  Rhythm: a fib with RVR converted to NSR    Lungs: coarse    Cardiovascular: irregular to RRR normal s1 and s2    Abdomen: soft, mild distension    Extremeties: minimal edema    Incision: CDI    CT: n/a    Weight:   Vitals:    01/04/19 1343 01/05/19 0600 01/06/19 0600 01/07/19 0600   Weight: 77.1 kg (170 lb) 84 kg (185 lb 3 oz) 84.5 kg (186 lb 4.6 oz) 86 kg (189 lb 9.5 oz)    01/08/19 0000   Weight: 86.9 kg (191 lb 9.3 oz)            Data:   Labs:   Lab Results   Component Value Date    WBC 6.6 01/08/2019     Lab Results   Component Value Date    RBC 3.03 01/08/2019     Lab Results   Component Value Date    HGB 8.7 01/08/2019     Lab Results   Component Value Date    HCT 25.5 01/08/2019 "     No components found for: MCT  Lab Results   Component Value Date    MCV 84 01/08/2019     Lab Results   Component Value Date    MCH 28.7 01/08/2019     Lab Results   Component Value Date    MCHC 34.1 01/08/2019     Lab Results   Component Value Date    RDW 16.8 01/08/2019     Lab Results   Component Value Date     01/08/2019       Last Basic Metabolic Panel:  Lab Results   Component Value Date     01/08/2019      Lab Results   Component Value Date    POTASSIUM 3.9 01/08/2019     Lab Results   Component Value Date    CHLORIDE 111 01/08/2019     Lab Results   Component Value Date    BESS 7.6 01/08/2019     Lab Results   Component Value Date    CO2 21 01/08/2019     Lab Results   Component Value Date    BUN 59 01/08/2019     Lab Results   Component Value Date    CR 1.65 01/08/2019     Lab Results   Component Value Date    GLC 99 01/08/2019       CXR: 1/7/19    FINDINGS:  Patient is status post a midline sternotomy. [place]  catheter is in place. Mild right basilar opacity. This is slightly  increased since 1/6/2019. This would be compatible with infiltrate or  atelectasis.                                                                      IMPRESSION: Slight increase in the right basilar opacity. No other  change.    Roxana Carbajal PA-C  CV Surgery  Pager # [phone]

## 2019-01-08 NOTE — PLAN OF CARE
Neuro: A/Ox4, PERRLA.   Respiratory: LS clear. BiPap 70% FiO2   Cardiac:Tele is ST 130s, amiodarone stopped d/t hypotension. Low-grade fever.  GI: BSx4, 5 watery stools in 2 hours, flexiseal placed.  : Barrientos patent, adequate output, lasix given x1 per nephrology.  Skin: Incisions CDI.   Pain: Denies pain  Mobility:. Bedrest this shift.       Family updated and involved in plan of care.

## 2019-01-09 ENCOUNTER — APPOINTMENT (OUTPATIENT)
Dept: GENERAL RADIOLOGY | Facility: CLINIC | Age: 79
DRG: 003 | End: 2019-01-09
Payer: COMMERCIAL

## 2019-01-09 ENCOUNTER — APPOINTMENT (OUTPATIENT)
Dept: PHYSICAL THERAPY | Facility: CLINIC | Age: 79
DRG: 003 | End: 2019-01-09
Payer: COMMERCIAL

## 2019-01-09 LAB
ANION GAP SERPL CALCULATED.3IONS-SCNC: 11 MMOL/L (ref 3–14)
BASE DEFICIT BLDA-SCNC: 6.5 MMOL/L
BASE DEFICIT BLDV-SCNC: 6.4 MMOL/L
BUN SERPL-MCNC: 63 MG/DL (ref 7–30)
CALCIUM SERPL-MCNC: 7.8 MG/DL (ref 8.5–10.1)
CHLORIDE SERPL-SCNC: 108 MMOL/L (ref 94–109)
CO2 SERPL-SCNC: 19 MMOL/L (ref 20–32)
CREAT SERPL-MCNC: 1.68 MG/DL (ref 0.52–1.04)
ERYTHROCYTE [DISTWIDTH] IN BLOOD BY AUTOMATED COUNT: 16.5 % (ref 10–15)
GFR SERPL CREATININE-BSD FRML MDRD: 29 ML/MIN/{1.73_M2}
GLUCOSE BLDC GLUCOMTR-MCNC: 117 MG/DL (ref 70–99)
GLUCOSE BLDC GLUCOMTR-MCNC: 84 MG/DL (ref 70–99)
GLUCOSE SERPL-MCNC: 93 MG/DL (ref 70–99)
HCO3 BLD-SCNC: 18 MMOL/L (ref 21–28)
HCO3 BLDV-SCNC: 18 MMOL/L (ref 21–28)
HCT VFR BLD AUTO: 25.4 % (ref 35–47)
HGB BLD-MCNC: 8.7 G/DL (ref 11.7–15.7)
INTERPRETATION ECG - MUSE: NORMAL
MCH RBC QN AUTO: 29 PG (ref 26.5–33)
MCHC RBC AUTO-ENTMCNC: 34.3 G/DL (ref 31.5–36.5)
MCV RBC AUTO: 85 FL (ref 78–100)
O2/TOTAL GAS SETTING VFR VENT: 96 %
OXYHGB MFR BLD: 96 % (ref 92–100)
OXYHGB MFR BLDV: 79 %
PCO2 BLD: 31 MM HG (ref 35–45)
PCO2 BLDV: 32 MM HG (ref 40–50)
PH BLD: 7.37 PH (ref 7.35–7.45)
PH BLDV: 7.36 PH (ref 7.32–7.43)
PLATELET # BLD AUTO: 135 10E9/L (ref 150–450)
PO2 BLD: 86 MM HG (ref 80–105)
PO2 BLDV: 45 MM HG (ref 25–47)
POTASSIUM SERPL-SCNC: 3.9 MMOL/L (ref 3.4–5.3)
RBC # BLD AUTO: 3 10E12/L (ref 3.8–5.2)
SODIUM SERPL-SCNC: 138 MMOL/L (ref 133–144)
WBC # BLD AUTO: 9.2 10E9/L (ref 4–11)

## 2019-01-09 PROCEDURE — 40000809 ZZH STATISTIC NO DOCUMENTATION TO SUPPORT CHARGE

## 2019-01-09 PROCEDURE — 36415 COLL VENOUS BLD VENIPUNCTURE: CPT | Performed by: NURSE PRACTITIONER

## 2019-01-09 PROCEDURE — 25000128 H RX IP 250 OP 636: Performed by: STUDENT IN AN ORGANIZED HEALTH CARE EDUCATION/TRAINING PROGRAM

## 2019-01-09 PROCEDURE — 99233 SBSQ HOSP IP/OBS HIGH 50: CPT | Performed by: NURSE PRACTITIONER

## 2019-01-09 PROCEDURE — 94640 AIRWAY INHALATION TREATMENT: CPT | Mod: 76

## 2019-01-09 PROCEDURE — 00000146 ZZHCL STATISTIC GLUCOSE BY METER IP

## 2019-01-09 PROCEDURE — 25000128 H RX IP 250 OP 636: Performed by: THORACIC SURGERY (CARDIOTHORACIC VASCULAR SURGERY)

## 2019-01-09 PROCEDURE — 94640 AIRWAY INHALATION TREATMENT: CPT

## 2019-01-09 PROCEDURE — 82805 BLOOD GASES W/O2 SATURATION: CPT | Performed by: NURSE PRACTITIONER

## 2019-01-09 PROCEDURE — 25000128 H RX IP 250 OP 636: Performed by: PHYSICIAN ASSISTANT

## 2019-01-09 PROCEDURE — 25000132 ZZH RX MED GY IP 250 OP 250 PS 637: Performed by: INTERNAL MEDICINE

## 2019-01-09 PROCEDURE — 25000132 ZZH RX MED GY IP 250 OP 250 PS 637: Performed by: PHYSICIAN ASSISTANT

## 2019-01-09 PROCEDURE — 85027 COMPLETE CBC AUTOMATED: CPT | Performed by: PHYSICIAN ASSISTANT

## 2019-01-09 PROCEDURE — 25000125 ZZHC RX 250: Performed by: NURSE PRACTITIONER

## 2019-01-09 PROCEDURE — 36415 COLL VENOUS BLD VENIPUNCTURE: CPT | Performed by: PHYSICIAN ASSISTANT

## 2019-01-09 PROCEDURE — 40000193 ZZH STATISTIC PT WARD VISIT: Performed by: PHYSICAL THERAPIST

## 2019-01-09 PROCEDURE — 99231 SBSQ HOSP IP/OBS SF/LOW 25: CPT | Performed by: INTERNAL MEDICINE

## 2019-01-09 PROCEDURE — 97530 THERAPEUTIC ACTIVITIES: CPT | Mod: GP | Performed by: PHYSICAL THERAPIST

## 2019-01-09 PROCEDURE — 40000275 ZZH STATISTIC RCP TIME EA 10 MIN

## 2019-01-09 PROCEDURE — 20000003 ZZH R&B ICU

## 2019-01-09 PROCEDURE — 25000132 ZZH RX MED GY IP 250 OP 250 PS 637: Performed by: STUDENT IN AN ORGANIZED HEALTH CARE EDUCATION/TRAINING PROGRAM

## 2019-01-09 PROCEDURE — 80048 BASIC METABOLIC PNL TOTAL CA: CPT | Performed by: PHYSICIAN ASSISTANT

## 2019-01-09 PROCEDURE — 36600 WITHDRAWAL OF ARTERIAL BLOOD: CPT

## 2019-01-09 PROCEDURE — 71045 X-RAY EXAM CHEST 1 VIEW: CPT

## 2019-01-09 PROCEDURE — P9041 ALBUMIN (HUMAN),5%, 50ML: HCPCS | Performed by: PHYSICIAN ASSISTANT

## 2019-01-09 RX ORDER — ALBUMIN, HUMAN INJ 5% 5 %
250 SOLUTION INTRAVENOUS ONCE
Status: COMPLETED | OUTPATIENT
Start: 2019-01-09 | End: 2019-01-09

## 2019-01-09 RX ADMIN — PANTOPRAZOLE SODIUM 40 MG: 40 TABLET, DELAYED RELEASE ORAL at 06:49

## 2019-01-09 RX ADMIN — HEPARIN SODIUM 5000 UNITS: 10000 INJECTION, SOLUTION INTRAVENOUS; SUBCUTANEOUS at 04:40

## 2019-01-09 RX ADMIN — CEFEPIME HYDROCHLORIDE 2 G: 2 INJECTION, POWDER, FOR SOLUTION INTRAVENOUS at 20:33

## 2019-01-09 RX ADMIN — METOPROLOL TARTRATE 25 MG: 25 TABLET ORAL at 08:52

## 2019-01-09 RX ADMIN — AMIODARONE HYDROCHLORIDE 400 MG: 200 TABLET ORAL at 20:32

## 2019-01-09 RX ADMIN — LEVALBUTEROL HYDROCHLORIDE 1.25 MG: 1.25 SOLUTION, CONCENTRATE RESPIRATORY (INHALATION) at 22:43

## 2019-01-09 RX ADMIN — OXYCODONE HYDROCHLORIDE 5 MG: 5 TABLET ORAL at 20:32

## 2019-01-09 RX ADMIN — POTASSIUM CHLORIDE 20 MEQ: 1500 TABLET, EXTENDED RELEASE ORAL at 08:07

## 2019-01-09 RX ADMIN — LEVALBUTEROL HYDROCHLORIDE 1.25 MG: 1.25 SOLUTION, CONCENTRATE RESPIRATORY (INHALATION) at 19:22

## 2019-01-09 RX ADMIN — HEPARIN SODIUM 5000 UNITS: 10000 INJECTION, SOLUTION INTRAVENOUS; SUBCUTANEOUS at 11:44

## 2019-01-09 RX ADMIN — LEVALBUTEROL HYDROCHLORIDE 1.25 MG: 1.25 SOLUTION, CONCENTRATE RESPIRATORY (INHALATION) at 07:08

## 2019-01-09 RX ADMIN — AMIODARONE HYDROCHLORIDE 400 MG: 200 TABLET ORAL at 08:07

## 2019-01-09 RX ADMIN — METOPROLOL TARTRATE 25 MG: 25 TABLET ORAL at 20:32

## 2019-01-09 RX ADMIN — HEPARIN SODIUM 5000 UNITS: 10000 INJECTION, SOLUTION INTRAVENOUS; SUBCUTANEOUS at 20:33

## 2019-01-09 RX ADMIN — LEVALBUTEROL HYDROCHLORIDE 1.25 MG: 1.25 SOLUTION, CONCENTRATE RESPIRATORY (INHALATION) at 15:48

## 2019-01-09 RX ADMIN — LEVALBUTEROL HYDROCHLORIDE 1.25 MG: 1.25 SOLUTION, CONCENTRATE RESPIRATORY (INHALATION) at 12:11

## 2019-01-09 RX ADMIN — OXYCODONE HYDROCHLORIDE 5 MG: 5 TABLET ORAL at 05:58

## 2019-01-09 RX ADMIN — ASPIRIN 81 MG: 81 TABLET, COATED ORAL at 21:17

## 2019-01-09 RX ADMIN — CEFEPIME HYDROCHLORIDE 2 G: 2 INJECTION, POWDER, FOR SOLUTION INTRAVENOUS at 08:07

## 2019-01-09 RX ADMIN — ALBUMIN HUMAN 250 ML: 0.05 INJECTION, SOLUTION INTRAVENOUS at 11:44

## 2019-01-09 RX ADMIN — LEVALBUTEROL HYDROCHLORIDE 1.25 MG: 1.25 SOLUTION, CONCENTRATE RESPIRATORY (INHALATION) at 03:13

## 2019-01-09 ASSESSMENT — ACTIVITIES OF DAILY LIVING (ADL)
ADLS_ACUITY_SCORE: 16
SWALLOWING: 0-->SWALLOWS FOODS/LIQUIDS WITHOUT DIFFICULTY
ADLS_ACUITY_SCORE: 14
RETIRED_EATING: 0-->INDEPENDENT
ADLS_ACUITY_SCORE: 16
COGNITION: 0 - NO COGNITION ISSUES REPORTED
ADLS_ACUITY_SCORE: 14
ADLS_ACUITY_SCORE: 14
DRESS: 0-->INDEPENDENT
RETIRED_COMMUNICATION: 0-->UNDERSTANDS/COMMUNICATES WITHOUT DIFFICULTY
ADLS_ACUITY_SCORE: 16

## 2019-01-09 ASSESSMENT — MIFFLIN-ST. JEOR: SCORE: 1362

## 2019-01-09 NOTE — PLAN OF CARE
Pt A+Ox4, able to make needs known, coping well, cooperative with staff, using call light appropriately. Afebrile, BP soft at times sys  Albumin given x 1, HR A-fib with RVR pt on scheduled oral metoprolol and amiodarone, O2 sats maintained on high-katie NC. Pt denies pain and SOB. Pt has been reaching 500 on IS. Pt has coello and Flexiseal in place. Pt repositioned in bed and up to recliner with an assist of 2. Will continue to monitor.

## 2019-01-09 NOTE — PLAN OF CARE
Discharge Planner PT   Patient plan for discharge: rehab  Current status: HR remains 110s-130s at rest, ith activity up to 140 at times. BP and O2 stable throughout, on HFNC 80% 35L at time of session.  Dependent tx to combilizer chair, stood at 45 degrees for 10 min, performed some standing ex in the chair. RR increased with what appears to be a bit more labored breathing, O2 remains upper 90s. Returned to chair position, pt reports feeling better in this chair than the regular highback chair.  Barriers to return to prior living situation: Level of assist, sternal prec, fatigue, impaired activity tolerance, decreased balance  Recommendations for discharge: Acute Rehab  Rationale for recommendations: pt will benefit from continued skilled rehab services to progress independence and safety with functional mobility, increase exercise tolerance and continue eduction for optimal heart health. Motivated, independent PLOF, excellent family support.       Entered by: Na Nassar 01/09/2019 3:53 PM

## 2019-01-09 NOTE — PLAN OF CARE
CR: Attempted AM CR. Pt with -130 at rest, sleeping soundly. Yesterday morning HR as high, attempted bed ex with decrease in O2. Will hold on CR for am and see for PM session as appropriate.

## 2019-01-09 NOTE — PROGRESS NOTES
Renal Medicine Progress Note            Assessment/Plan:     78 y.o woman with type A aortic dissection, following for ARF.      # Patient with a serum creatinine of 0.8 mg/dl on admission.      # ARF. Scr ~ 1.6-1.9 since surgery. Stable.                -IV contrast on 1/4               -dissection involved the R renal artery               -duplex showed atrophic RK and not much flow into the right kidney and renogram showed 34% function on the RK and 66% function on the left kidney                                                                                                                                    # Type A aortic dissection s/p repair. Dissected all the way down to the abdominal aortic bifurcation into the left and right common iliac arteries.     # Anemia and thrombocytopenia. Pretty Fe def.      #Afib with RVR. BP is soft. On amiodarone and metoprolol    # Pulm. Continues to be hypoxic and on 100% FIO2 on HF. Last CXR on 1/7.        Plan.   # If weight and I/O are accurate, her weight is up ~ 12 kg and she is +7 liters since admission. She does not seem to have a lot peripheral edema, <1+ at the thigh. Consider a try of loop diuretic.   # Need to get afib better control, which should improve her blood pressure.   # I discussed plan with ICU team        Interval History:     Pt remains on 100% on HF. She remains in Afib with RVR. BP is soft. Scr is stable.           Medications and Allergies:       amiodarone  400 mg Oral BID     aspirin  81 mg Oral Daily     ceFEPIme (MAXIPIME) IV  2 g Intravenous Q12H     heparin  5,000 Units Subcutaneous Q8H     insulin aspart  1-6 Units Subcutaneous Q4H     levalbuterol  1.25 mg Nebulization Q4H     metoprolol tartrate  25 mg Oral BID     pantoprazole  40 mg Oral QAM AC     sodium chloride (PF)  3 mL Intracatheter Q8H        Allergies   Allergen Reactions     Amoxicillin Swelling     Ciprofloxacin Swelling            Physical Exam:   Vitals were reviewed  Heart  "Rate: 130, Blood pressure 99/61, pulse 135, temperature 97.6  F (36.4  C), temperature source Oral, resp. rate 16, height 1.626 m (5' 4\"), weight 88.7 kg (195 lb 8.8 oz), SpO2 95 %.    Wt Readings from Last 3 Encounters:   01/09/19 88.7 kg (195 lb 8.8 oz)   09/14/08 80.7 kg (178 lb)       Intake/Output Summary (Last 24 hours) at 1/9/2019 0809  Last data filed at 1/9/2019 0600  Gross per 24 hour   Intake 497.5 ml   Output 905 ml   Net -407.5 ml       GENERAL APPEARANCE: pleasant, NAD, alert  HEENT:  Eyes/ears/nose/neck grossly normal  RESP: Good airflow anteriorly. No wheezes. On HF.   CV: irregular, irregular, tachy  ABDOMEN: obese, soft, NT  EXTREMITIES/SKIN: no rashes/lesions on observed skin; not much edema in the legs, <1+ edema thighs and back.  Neuro: a/o x 3         Data:     CBC RESULTS:     Recent Labs   Lab 01/09/19  0550 01/08/19  0420 01/07/19  0430 01/06/19  0435 01/05/19  0500 01/04/19  2255   WBC 9.2 6.6 8.3 10.3 8.8 11.9*   RBC 3.00* 3.03* 2.89* 3.19* 2.90* 3.32*   HGB 8.7* 8.7* 8.3* 9.1* 8.2* 9.6*   HCT 25.4* 25.5* 24.6* 26.6* 24.9* 28.6*   * 101* 89* 74* 106* 122*       Basic Metabolic Panel:  Recent Labs   Lab 01/09/19  0550 01/08/19  1149 01/08/19  0420 01/07/19  1422 01/07/19  0430 01/06/19  1247 01/06/19  0435     --  143 143 143 145* 143   POTASSIUM 3.9 3.9 3.2* 3.7 3.5 4.0 3.5   CHLORIDE 108  --  111* 115* 112* 114* 111*   CO2 19*  --  21 20 21 21 21   BUN 63*  --  59* 54* 55* 44* 39*   CR 1.68*  --  1.65* 1.71* 1.99* 1.71* 1.72*   GLC 93  --  99 119* 106* 130* 129*   BESS 7.8*  --  7.6* 7.4* 7.3* 7.5* 7.3*       INR  Recent Labs   Lab 01/06/19  0435 01/04/19  2255 01/04/19 2012   INR 1.46* 1.35* 2.03*      Attestation:   I have reviewed today's relevant vital signs, notes, medications, labs and imaging.    Darwin Peterson MD  [facility] - Nephrology  Office phone :[phone]  Pager: [phone]  "

## 2019-01-09 NOTE — PROGRESS NOTES
SPIRITUAL HEALTH SERVICES  Spiritual Assessment Progress Note  [facility] ICU   visited with pt and  per LOS.  They were both quite talkative, reflecting on the incident that brought pt urgently to the hospital.  Pt stated that she is kelly to be alive.   Pt talked about her family - she was actually visiting her 97 yo mother in a nursing home when this happened.    Pt is [employer] and attends [employer].  Pt would like to be visited by [facility].       listened as pt and  shared their story.  Provided emotional support and encouragement.  Made referral to [facility] to provide sacrament of the sick.      SH continues to be available as needed.                                                                             Addendum:   also notified [facility] so that pt can be visited by staff.                                                                Gianna Cantrell M.Div., [facility]  Staff    Pager [phone]

## 2019-01-09 NOTE — PLAN OF CARE
Patient stable but BP soft. BP goal of SBP <120 and MAP > 60 maintained. Heart rhythm today went from rapid a-fib to sinus rhythm then back to rapid atrial fib. On scheduled beta blocker. Amio infusion off. PO amio ordered. Up to chair with use of ceiling lift. Generalized weakness and weak respiratory effort continues. Incentive spirometer level to 500. Remains on 100% FIO2 Hi-Joe nasal cannula. Attempts to wean O2 down failed. Work of breathing is non-labored. UO continues to be low, 15-20ml/hr. MDs aware and is ongoing. Crystalloid given at beginning of shift as well as albumin given at end of shift. UO after fluids remained low. Bladder scanned and volume noted. Discussed with Dr. Dubon. Plan to replace coello next shift per Dr. Dubon. Abrahan and cordis pulled today. Family updated on patient status and plan of care throughout shift. Continuing to monitor closely.    Odette Collado RN

## 2019-01-09 NOTE — PROGRESS NOTES
[facility]  Cardiovascular and Thoracic Surgery Daily Note          Assessment and Plan:   POD#5 s/p Left groin cutdown with exposure of the femoral vessels with arterial cannulation for cardiopulmonary bypass. Right groin cutdown with exposure of the femoral vessels with the right femoral artery ultimately used for diagnostic angiography. 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 Visceral angiography. Repair of the bilateral femoral vessels. By Dr. Jose Winslow  -CVS: HR: 80s-130s. SBP: 90s-100s. ASA, BB. Rapid afib. Converted to NSR yesterday afternoon for a few hours then returned to Rapid a fib--transitioned to oral amiodarone. EP following-- will need coumadin prior to discharge but will hold off for a couple days   -Resp: Extubated within protocol. Saturating well on high flow. Continue to encourage IS, cough, deep breathing, ambulation. Will plan to repeat CXR today.   -Neuro:  Grossly intact. Pain controlled.   -Renal: up about 9kg from preoperative weight. Cr: 1.68<1.65<1.99<1.71. 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 Renogram on POD#3- atrophic right kidney. Nephrology following.   -GI:  Tolerating clears. +BM. Frequent watery stools-- C diff on POD#4 negative-- likely d/t prolonged abdominal ischemia. Continue bowel regimen  -:  Barrientos to remain in d/t limited mobility and need for accurate I&Os.  -Endo: pre op A1c: 5.3. Completed insulin gtt per protocol. Continue sliding scale insulin  -FEN: replace electrolytes as needed. Na: 138. K: 3.9.   -ID: Temp (24hrs), Av.1  F (37.8  C), Min:92.7  F (33.7  C), Max:101.5  F (38.6  C)  WBC: 9.2. Completing perioperative abx  -Heme: Hgb: 8.7. Plt: 135. Acute blood loss anemia and thrombocytopenia related to surgery.  -Proph: PCD, ASA, BB, statin, PPI, sub q heparin  -Dispo: Continue ICU cares. On oral amiodarone. Continue  "therapies. Continue to encourage IS, cough, deep breathing, ambulation.          Interval History:   Continues in rapid a fib this am. Seen by EP. Saturating well on room air. Pain controlled. Tolerating clears. +BM         Medications:       amiodarone  400 mg Oral BID     aspirin  81 mg Oral Daily     ceFEPIme (MAXIPIME) IV  2 g Intravenous Q12H     heparin  5,000 Units Subcutaneous Q8H     insulin aspart  1-6 Units Subcutaneous Q4H     levalbuterol  1.25 mg Nebulization Q4H     metoprolol tartrate  25 mg Oral BID     pantoprazole  40 mg Oral QAM AC     sodium chloride (PF)  3 mL Intracatheter Q8H     acetaminophen, IV fluid REPLACEMENT ONLY, glucose **OR** dextrose **OR** glucagon, fentaNYL, hydrALAZINE, lidocaine 4%, lidocaine (buffered or not buffered), magnesium sulfate, magnesium sulfate, meperidine, methocarbamol, naloxone, ondansetron **OR** ondansetron, oxyCODONE IR, potassium chloride, potassium chloride with lidocaine, potassium chloride, potassium chloride, potassium chloride, sodium chloride (PF), sodium phosphate, sodium phosphate, sodium phosphate, sodium phosphate          Physical Exam:   Vitals were reviewed  Blood pressure (!) 89/72, pulse 129, temperature 97.6  F (36.4  C), temperature source Oral, resp. rate 24, height 1.626 m (5' 4\"), weight 88.7 kg (195 lb 8.8 oz), SpO2 93 %.  Rhythm: a fib with RVR    Lungs: coarse    Cardiovascular: irregular    Abdomen: soft, mild distension    Extremeties: minimal edema    Incision: CDI    CT: n/a    Weight:   Vitals:    01/05/19 0600 01/06/19 0600 01/07/19 0600 01/08/19 0000   Weight: 84 kg (185 lb 3 oz) 84.5 kg (186 lb 4.6 oz) 86 kg (189 lb 9.5 oz) 86.9 kg (191 lb 9.3 oz)    01/09/19 0600   Weight: 88.7 kg (195 lb 8.8 oz)            Data:   Labs:   Lab Results   Component Value Date    WBC 9.2 01/09/2019     Lab Results   Component Value Date    RBC 3.00 01/09/2019     Lab Results   Component Value Date    HGB 8.7 01/09/2019     Lab Results   Component " Value Date    HCT 25.4 01/09/2019     No components found for: MCT  Lab Results   Component Value Date    MCV 85 01/09/2019     Lab Results   Component Value Date    MCH 29.0 01/09/2019     Lab Results   Component Value Date    MCHC 34.3 01/09/2019     Lab Results   Component Value Date    RDW 16.5 01/09/2019     Lab Results   Component Value Date     01/09/2019       Last Basic Metabolic Panel:  Lab Results   Component Value Date     01/09/2019      Lab Results   Component Value Date    POTASSIUM 3.9 01/09/2019     Lab Results   Component Value Date    CHLORIDE 108 01/09/2019     Lab Results   Component Value Date    BESS 7.8 01/09/2019     Lab Results   Component Value Date    CO2 19 01/09/2019     Lab Results   Component Value Date    BUN 63 01/09/2019     Lab Results   Component Value Date    CR 1.68 01/09/2019     Lab Results   Component Value Date    GLC 93 01/09/2019       Roxana Carbajal PA-C  CV Surgery  Pager # [phone]

## 2019-01-09 NOTE — PROGRESS NOTES
"EP Progress Note          Assessment and Plan:   Priya Funez is a 78 year old female with PMH of HTN admitted on 2019 with sudden onset of back pain found to have type A aortic dissection and sent for emergency repair. She is s/p aortic dissection repair with graft.  Postprocedure was complicated by acute renal failure and A. fib with RVR.  EP was consulted to help with management.    1. Persistent atrial fibrillation   Briefly sinus rhythm for a few hours on  then reverted back to afib  On po amiodarone (Amiodarone 400 mg PO BID for a day, then decrease to 400 mg PO daily for 4 days, and then decrease to 200 mg PO daily).    On asa 81 mg every day, and metoprolol 25 mg bid. Pressures are soft, but stable.    CHADS-VASc 3-reviewed with [facility] and they would like to wait a few days then initiate anticoagulation.    EP will continue to follow        Gabby Flores CNP        Interval History:   Pt alert and conversing. Wt up anywhere from 4-11 kgs (if admission weight was a verbal -uncertain of accuracy) Anemia stable, and creat slowly improving.  Denies chest pain, sob, or palpitations. Blood pressure soft. Tele afib 120's.        Medications:       amiodarone  400 mg Oral BID     aspirin  81 mg Oral Daily     ceFEPIme (MAXIPIME) IV  2 g Intravenous Q12H     heparin  5,000 Units Subcutaneous Q8H     insulin aspart  1-6 Units Subcutaneous Q4H     levalbuterol  1.25 mg Nebulization Q4H     metoprolol tartrate  25 mg Oral BID     pantoprazole  40 mg Oral QAM AC     sodium chloride (PF)  3 mL Intracatheter Q8H             Review of Systems: If done, described below  The Review of Systems is negative other than noted in the HPI             Physical Exam:   Blood pressure 91/75, pulse 135, temperature 97.6  F (36.4  C), temperature source Oral, resp. rate 15, height 1.626 m (5' 4\"), weight 88.7 kg (195 lb 8.8 oz), SpO2 92 %.        Vital Sign Ranges  Temperature Temp  Av.3  F (37.9  C)  Min: 92.7  F (33.7  C)  " Max: 101.5  F (38.6  C)   Blood pressure Systolic (24hrs), Av , Min:71 , Max:118        Diastolic (24hrs), Av, Min:38, Max:85      Pulse Pulse  Av.6  Min: 83  Max: 142   Respirations Resp  Av.9  Min: 12  Max: 32   Pulse oximetry SpO2  Av.2 %  Min: 85 %  Max: 100 %         Intake/Output Summary (Last 24 hours) at 2019 0947  Last data filed at 2019 0800  Gross per 24 hour   Intake 587.5 ml   Output 965 ml   Net -377.5 ml       Constitutional:   in no apparent distress, on HFNC 35/l.      Lungs:   symmetric, clear to auscultation     Cardiovascular:   irregularly irregular rhythm     Extremities and Back:   No edema              Data:     Lab Results   Component Value Date    WBC 9.2 2019    HGB 8.7 (L) 2019    HCT 25.4 (L) 2019     (L) 2019     2019    POTASSIUM 3.9 2019    CHLORIDE 108 2019    CO2 19 (L) 2019    BUN 63 (H) 2019    CR 1.68 (H) 2019    GLC 93 2019    TROPI <0.015 2019    AST 51 (H) 2019    ALT 19 2019    ALKPHOS 53 2019    BILITOTAL 1.6 (H) 2019    INR 1.46 (H) 2019

## 2019-01-09 NOTE — PROGRESS NOTES
[facility]  Critical Care Service  Progress Note  Date of Service (when I saw the patient): 01/09/2019     Main Plans for Today    Titrate down FiO2  Encourage pulmonary hygiene   Work on increasing mobility/Cardiac rehab   ABG/VBG to assess oxygenation and acid base status     Assessment & Plan   Priya Funez is a 78 year old female with PMH of HTN admitted on 1/4/2019 with sudden onset of back pain found to have type A aortic dissection and sent for emergency repair.  She is POD#3 for aortic dissection repair with graft. Bypass time was 114min. Net I/O was 3.9L. She was admitted to ICU for ongoing care.     1/5: did well overnight.  Epi weaned off; requiring low dose phenylephrine.  Hgb stable. Extubated in the afternoon.   1/6:poor inspiratory effort and weak cough through the day.  Placed on Bipap overnight.  NTG gtt for improved BP control.  Rigors and fever to 103  1/7: Fever resolved; stable respiratory status on HFNC.  SvO2 48; getting echocardiogram.  Normal cardiac output; EF 55-60%   1/8: Rapid A Fib  1/9: Titrating down FiO2; improved ABG and VBG     Neuro  1. Encephalopathy; multifactorial; improved   2. Acute pain  Plan:  -- PRN acetaminophen, fentanyl, robaxin and oxycodone    -- Delirium prevention as able     CV  1. S/p Type A aortic dissection repair 1/4  2. HTN  3. Low SvO2/ venous oxyhemoglobin of 46  4. Rapid A Fib   Plan:  --CV Surgery and Vascular surgery primary   --Monitor hemodynamics.  Maintain SBP<130  --Amio drip started for rapid A Fib; bolus x 2 without significant improvement.  Converted to SR briefly but now back in sustained rapid A Fib   --EP following for rate control.  No additional agents right now.  Consider cardioversion if no improvement in next several days   --Cardiac meds per CV surgery  --Echocardiogram 1/7; EF 50-55%; IVC not dilated, ventricles small     Resp:  1. Post operative ventilator management; resolved   2. Acute hypoxemic respiratory  failure--weak cough, poor inspiratory effort, ongoing hypoxemia.    Plan:  --HFNC as tolerated. In no respiratory distress.  BiPAP at HS if desaturating.  Oxygenation and venous oxyhemoglobin improved on hases this am. Titrate HFNC as tolerated   --Continue Xopenex nebs   --IS hourly while awake as able, up to chair     GI/Nutrition  1. No prior hx  Plan:  --Slowly advance diet as tolerated; advanced to full liquid today     --PPI    Renal  1. IMAN; abdominal aortic dissection involving right renal artery.  Atrophic right kidney with decreased flow. No prior hx.  Baseline creatinine appears to be 0.8; renal function improving daily   Plan:  --Nephrology following.  Renogram 1/7.  Renal US 1/6 with flow present in both R/L renal arteries. Right kidney atrophic.    --monitor function and electrolytes as needed with replacement per ICU protocols.   -- generally avoid nephrotoxic agents such as NSAID, IV contrast unless specifically required  -- adjust medications as needed for renal clearance  -- follow I/O's as appropriate.  --volume replacement as needed; still relatively low urine output; weight up since admission.  I&Os do not accurately reflect status as there was a great deal of stool incontinence that was not recorded      ID  1. Fevers with rigors.   Plan:  --empiric Cefepime and Vancomycin. Vancomycin complete.  Continue Cefepime in setting of fevers.    --monitor culture data; blood cultures pending. No growth to date.   --monitor fevers and WBCs; recheck procal 1/9    Endocrine  1. Stress Hyperglycemia; improved   Plan:  --  BG check Q 4 hours. Medium resistance sliding scale insulin     Heme:  1. Acute blood loss anemia  2. Thrombocytopenia  Plan:  -- Monitor hemoglobin. Transfuse to keep > 7.0  --Monitor platelet count; ok to give subcutaneous heparin     MSK  1. Sternotomy   Plan:  --sternal precautions     Skin  1. Surgical incisions   Plan:  --incision care per protocol     General cares:  DVT  Prophylaxis: Heparin SQ  GI Prophylaxis: PPI  Restraints: Restraints for medical healing needed: NO  Family update by me today: Yes   Current lines are required for patient management  Access:  LULÚ Negrete    Time Spent on this Encounter   Billing:  I spent 45 minutes bedside and on the inpatient unit today managing the critical care of Priya Funez in relation to the issues listed in this note.    Interval History   Tolerated HFNC overnight, however on 100% FiO2 all night.  Patient is breathing comfortably, talking in full sentences, using IS with poor effort. FiO2 titrated down to 75%; SpO2 remains in mid 90s.  Continue to titrate down as tolerated. Patient denies pain. Has itching at midline sternal incision site.  States breathing feels the same.         Physical Exam   Temp: 97.6  F (36.4  C) Temp src: Oral Temp  Min: 92.7  F (33.7  C)  Max: 101.5  F (38.6  C) BP: 91/75 Pulse: 135 Heart Rate: 124 Resp: 15 SpO2: 92 % O2 Device: High Flow Nasal Cannula (HFNC) Oxygen Delivery: Other (Comments)(35 Liters)  Vitals:    01/07/19 0600 01/08/19 0000 01/09/19 0600   Weight: 86 kg (189 lb 9.5 oz) 86.9 kg (191 lb 9.3 oz) 88.7 kg (195 lb 8.8 oz)     I/O last 3 completed shifts:  In: 557.5 [P.O.:160; I.V.:397.5]  Out: 980 [Urine:480; Stool:500]    GEN: awake, alert, NAD   EYES: PERRL, 3+, Anicteric sclera.   HEENT:  Normocephalic, atraumatic  CV: irregularly irregular tachycardia, no gallops, rubs, or murmurs  PULM/CHEST: Clear breath sounds bilaterally without rhonchi, crackles or wheeze, symmetric chest rise  GI: soft, distended, non-tender    : coello catheter in place, urine yellow and clear  EXTREMITIES: trace peripheral edema, moving all extremities, peripheral pulses intact  NEURO: no focal deficits, VERDIN   SKIN: Surgical incisions CDI  PSYCH:  Affect: calm and appropriate   Imaging personally reviewed: no new   ECG: A Fib with RVR       Data   Recent Labs   Lab 01/09/19  0550 01/08/19  1149  01/08/19  0420 01/07/19  1422 01/07/19  0430  01/06/19  0435 01/05/19  0500 01/04/19 2255 01/04/19 2012 01/04/19  1400   WBC 9.2  --  6.6  --  8.3  --  10.3 8.8 11.9* 15.1*  --  17.7*   HGB 8.7*  --  8.7*  --  8.3*  --  9.1* 8.2* 9.6* 6.9*   < > 13.2   MCV 85  --  84  --  85  --  83 86 86 88  --  90   *  --  101*  --  89*  --  74* 106* 122* 134*  --  227   INR  --   --   --   --   --   --  1.46*  --  1.35* 2.03*  --   --      --  143 143 143   < > 143 143 144 145*   < > 137   POTASSIUM 3.9 3.9 3.2* 3.7 3.5   < > 3.5 4.6 3.1* 3.6   < > 3.4   CHLORIDE 108  --  111* 115* 112*   < > 111* 109 107 109  --  105   CO2 19*  --  21 20 21   < > 21 23 24 21  --  22   BUN 63*  --  59* 54* 55*   < > 39* 25 20 17  --  17   CR 1.68*  --  1.65* 1.71* 1.99*   < > 1.72* 1.04 0.83 0.75  --  0.80   ANIONGAP 11  --  11 8 10   < > 11 11 13 15*  --  10   BESS 7.8*  --  7.6* 7.4* 7.3*   < > 7.3* 7.4* 7.2* 9.2  --  8.8   GLC 93  --  99 119* 106*   < > 129* 167* 226* 214*  --  125*   ALBUMIN  --   --   --  2.8* 3.0*  --   --  3.4 2.6*  --   --  3.6   PROTTOTAL  --   --   --   --  5.4*  --   --  5.2* 4.8*  --   --  6.8   BILITOTAL  --   --   --   --  1.6*  --   --  1.5* 1.1  --   --  1.3   ALKPHOS  --   --   --   --  53  --   --  43 54  --   --  92   ALT  --   --   --   --  19  --   --  75* 65*  --   --  24   AST  --   --   --   --  51*  --   --  188* 147*  --   --  27   TROPI  --   --   --   --   --   --   --   --   --   --   --  <0.015    < > = values in this interval not displayed.

## 2019-01-09 NOTE — PROGRESS NOTES
Pt remains on the HFNC 35 lpm and 100%, SpO2 94%, BBS clear, neb tx were given as ordered, RT will continue to monitor the pt.     Jesus Sifuentes RT.

## 2019-01-10 ENCOUNTER — APPOINTMENT (OUTPATIENT)
Dept: GENERAL RADIOLOGY | Facility: CLINIC | Age: 79
DRG: 003 | End: 2019-01-10
Payer: COMMERCIAL

## 2019-01-10 ENCOUNTER — APPOINTMENT (OUTPATIENT)
Dept: ULTRASOUND IMAGING | Facility: CLINIC | Age: 79
DRG: 003 | End: 2019-01-10
Payer: COMMERCIAL

## 2019-01-10 ENCOUNTER — APPOINTMENT (OUTPATIENT)
Dept: PHYSICAL THERAPY | Facility: CLINIC | Age: 79
DRG: 003 | End: 2019-01-10
Payer: COMMERCIAL

## 2019-01-10 LAB
ANION GAP SERPL CALCULATED.3IONS-SCNC: 9 MMOL/L (ref 3–14)
BACTERIA SPEC CULT: NORMAL
BUN SERPL-MCNC: 55 MG/DL (ref 7–30)
CALCIUM SERPL-MCNC: 8.2 MG/DL (ref 8.5–10.1)
CHLORIDE SERPL-SCNC: 108 MMOL/L (ref 94–109)
CO2 SERPL-SCNC: 20 MMOL/L (ref 20–32)
CREAT SERPL-MCNC: 1.44 MG/DL (ref 0.52–1.04)
ERYTHROCYTE [DISTWIDTH] IN BLOOD BY AUTOMATED COUNT: 16.3 % (ref 10–15)
GFR SERPL CREATININE-BSD FRML MDRD: 35 ML/MIN/{1.73_M2}
GLUCOSE BLDC GLUCOMTR-MCNC: 100 MG/DL (ref 70–99)
GLUCOSE BLDC GLUCOMTR-MCNC: 105 MG/DL (ref 70–99)
GLUCOSE BLDC GLUCOMTR-MCNC: 120 MG/DL (ref 70–99)
GLUCOSE BLDC GLUCOMTR-MCNC: 90 MG/DL (ref 70–99)
GLUCOSE BLDC GLUCOMTR-MCNC: 98 MG/DL (ref 70–99)
GLUCOSE SERPL-MCNC: 105 MG/DL (ref 70–99)
HCT VFR BLD AUTO: 24.3 % (ref 35–47)
HGB BLD-MCNC: 8.3 G/DL (ref 11.7–15.7)
INTERPRETATION ECG - MUSE: NORMAL
MAGNESIUM SERPL-MCNC: 2.6 MG/DL (ref 1.6–2.3)
MCH RBC QN AUTO: 29.1 PG (ref 26.5–33)
MCHC RBC AUTO-ENTMCNC: 34.2 G/DL (ref 31.5–36.5)
MCV RBC AUTO: 85 FL (ref 78–100)
PHOSPHATE SERPL-MCNC: 2.2 MG/DL (ref 2.5–4.5)
PLATELET # BLD AUTO: 151 10E9/L (ref 150–450)
POTASSIUM SERPL-SCNC: 4 MMOL/L (ref 3.4–5.3)
RBC # BLD AUTO: 2.85 10E12/L (ref 3.8–5.2)
SODIUM SERPL-SCNC: 137 MMOL/L (ref 133–144)
SPECIMEN SOURCE: NORMAL
WBC # BLD AUTO: 12.1 10E9/L (ref 4–11)

## 2019-01-10 PROCEDURE — 83735 ASSAY OF MAGNESIUM: CPT | Performed by: PHYSICIAN ASSISTANT

## 2019-01-10 PROCEDURE — 94640 AIRWAY INHALATION TREATMENT: CPT | Mod: 76

## 2019-01-10 PROCEDURE — 40000193 ZZH STATISTIC PT WARD VISIT

## 2019-01-10 PROCEDURE — 20000003 ZZH R&B ICU

## 2019-01-10 PROCEDURE — 85027 COMPLETE CBC AUTOMATED: CPT | Performed by: PHYSICIAN ASSISTANT

## 2019-01-10 PROCEDURE — 99232 SBSQ HOSP IP/OBS MODERATE 35: CPT | Performed by: INTERNAL MEDICINE

## 2019-01-10 PROCEDURE — 40000275 ZZH STATISTIC RCP TIME EA 10 MIN

## 2019-01-10 PROCEDURE — 94660 CPAP INITIATION&MGMT: CPT

## 2019-01-10 PROCEDURE — 25000125 ZZHC RX 250: Performed by: STUDENT IN AN ORGANIZED HEALTH CARE EDUCATION/TRAINING PROGRAM

## 2019-01-10 PROCEDURE — 25000128 H RX IP 250 OP 636: Performed by: STUDENT IN AN ORGANIZED HEALTH CARE EDUCATION/TRAINING PROGRAM

## 2019-01-10 PROCEDURE — 71045 X-RAY EXAM CHEST 1 VIEW: CPT

## 2019-01-10 PROCEDURE — 25000132 ZZH RX MED GY IP 250 OP 250 PS 637: Performed by: PHYSICIAN ASSISTANT

## 2019-01-10 PROCEDURE — 36415 COLL VENOUS BLD VENIPUNCTURE: CPT | Performed by: PHYSICIAN ASSISTANT

## 2019-01-10 PROCEDURE — 99233 SBSQ HOSP IP/OBS HIGH 50: CPT | Performed by: NURSE PRACTITIONER

## 2019-01-10 PROCEDURE — 76705 ECHO EXAM OF ABDOMEN: CPT

## 2019-01-10 PROCEDURE — 25000132 ZZH RX MED GY IP 250 OP 250 PS 637: Performed by: INTERNAL MEDICINE

## 2019-01-10 PROCEDURE — 84100 ASSAY OF PHOSPHORUS: CPT | Performed by: PHYSICIAN ASSISTANT

## 2019-01-10 PROCEDURE — 25000125 ZZHC RX 250: Performed by: NURSE PRACTITIONER

## 2019-01-10 PROCEDURE — 25000128 H RX IP 250 OP 636: Performed by: THORACIC SURGERY (CARDIOTHORACIC VASCULAR SURGERY)

## 2019-01-10 PROCEDURE — 97110 THERAPEUTIC EXERCISES: CPT | Mod: GP

## 2019-01-10 PROCEDURE — 94640 AIRWAY INHALATION TREATMENT: CPT

## 2019-01-10 PROCEDURE — 80048 BASIC METABOLIC PNL TOTAL CA: CPT | Performed by: PHYSICIAN ASSISTANT

## 2019-01-10 PROCEDURE — 25000132 ZZH RX MED GY IP 250 OP 250 PS 637: Performed by: STUDENT IN AN ORGANIZED HEALTH CARE EDUCATION/TRAINING PROGRAM

## 2019-01-10 PROCEDURE — 00000146 ZZHCL STATISTIC GLUCOSE BY METER IP

## 2019-01-10 RX ORDER — METOPROLOL SUCCINATE 25 MG/1
25 TABLET, EXTENDED RELEASE ORAL 2 TIMES DAILY
Status: DISCONTINUED | OUTPATIENT
Start: 2019-01-10 | End: 2019-01-12

## 2019-01-10 RX ADMIN — OXYCODONE HYDROCHLORIDE 5 MG: 5 TABLET ORAL at 07:02

## 2019-01-10 RX ADMIN — PANTOPRAZOLE SODIUM 40 MG: 40 TABLET, DELAYED RELEASE ORAL at 07:02

## 2019-01-10 RX ADMIN — LEVALBUTEROL HYDROCHLORIDE 1.25 MG: 1.25 SOLUTION, CONCENTRATE RESPIRATORY (INHALATION) at 15:52

## 2019-01-10 RX ADMIN — ACETAMINOPHEN 650 MG: 325 TABLET, FILM COATED ORAL at 20:32

## 2019-01-10 RX ADMIN — OXYCODONE HYDROCHLORIDE 5 MG: 5 TABLET ORAL at 17:10

## 2019-01-10 RX ADMIN — HEPARIN SODIUM 5000 UNITS: 10000 INJECTION, SOLUTION INTRAVENOUS; SUBCUTANEOUS at 20:48

## 2019-01-10 RX ADMIN — METOPROLOL TARTRATE 25 MG: 25 TABLET ORAL at 08:54

## 2019-01-10 RX ADMIN — LEVALBUTEROL HYDROCHLORIDE 1.25 MG: 1.25 SOLUTION, CONCENTRATE RESPIRATORY (INHALATION) at 19:14

## 2019-01-10 RX ADMIN — OXYCODONE HYDROCHLORIDE 5 MG: 5 TABLET ORAL at 20:32

## 2019-01-10 RX ADMIN — LEVALBUTEROL HYDROCHLORIDE 1.25 MG: 1.25 SOLUTION, CONCENTRATE RESPIRATORY (INHALATION) at 10:47

## 2019-01-10 RX ADMIN — DEXTROSE MONOHYDRATE 15 MMOL: 50 INJECTION, SOLUTION INTRAVENOUS at 10:30

## 2019-01-10 RX ADMIN — AMIODARONE HYDROCHLORIDE 400 MG: 200 TABLET ORAL at 08:54

## 2019-01-10 RX ADMIN — LEVALBUTEROL HYDROCHLORIDE 1.25 MG: 1.25 SOLUTION, CONCENTRATE RESPIRATORY (INHALATION) at 23:29

## 2019-01-10 RX ADMIN — HEPARIN SODIUM 5000 UNITS: 10000 INJECTION, SOLUTION INTRAVENOUS; SUBCUTANEOUS at 12:59

## 2019-01-10 RX ADMIN — LEVALBUTEROL HYDROCHLORIDE 1.25 MG: 1.25 SOLUTION, CONCENTRATE RESPIRATORY (INHALATION) at 03:03

## 2019-01-10 RX ADMIN — CEFEPIME HYDROCHLORIDE 2 G: 2 INJECTION, POWDER, FOR SOLUTION INTRAVENOUS at 20:32

## 2019-01-10 RX ADMIN — POTASSIUM CHLORIDE 20 MEQ: 1500 TABLET, EXTENDED RELEASE ORAL at 07:02

## 2019-01-10 RX ADMIN — AMIODARONE HYDROCHLORIDE 400 MG: 200 TABLET ORAL at 20:32

## 2019-01-10 RX ADMIN — HEPARIN SODIUM 5000 UNITS: 10000 INJECTION, SOLUTION INTRAVENOUS; SUBCUTANEOUS at 04:46

## 2019-01-10 RX ADMIN — LEVALBUTEROL HYDROCHLORIDE 1.25 MG: 1.25 SOLUTION, CONCENTRATE RESPIRATORY (INHALATION) at 07:26

## 2019-01-10 RX ADMIN — ASPIRIN 81 MG: 81 TABLET, COATED ORAL at 21:42

## 2019-01-10 RX ADMIN — METOPROLOL SUCCINATE 25 MG: 25 TABLET, EXTENDED RELEASE ORAL at 20:32

## 2019-01-10 RX ADMIN — CEFEPIME HYDROCHLORIDE 2 G: 2 INJECTION, POWDER, FOR SOLUTION INTRAVENOUS at 08:55

## 2019-01-10 ASSESSMENT — ACTIVITIES OF DAILY LIVING (ADL)
ADLS_ACUITY_SCORE: 12
ADLS_ACUITY_SCORE: 14
ADLS_ACUITY_SCORE: 14

## 2019-01-10 NOTE — PROGRESS NOTES
[facility]  Critical Care Service  Progress Note  Date of Service (when I saw the patient): 01/10/2019     Main Plans for Today    Titrate down FiO2  Encourage pulmonary hygiene   Thoracentesis ordered by CV surgery; assess degree of right sided effusion     Assessment & Plan   Priya DAMON Armin is a 78 year old female with PMH of HTN admitted on 1/4/2019 with sudden onset of back pain found to have type A aortic dissection and sent for emergency repair.  She is POD#3 for aortic dissection repair with graft. Bypass time was 114min. Net I/O was 3.9L. She was admitted to ICU for ongoing care.     1/5: did well overnight.  Epi weaned off; requiring low dose phenylephrine.  Hgb stable. Extubated in the afternoon.   1/6:poor inspiratory effort and weak cough through the day.  Placed on Bipap overnight.  NTG gtt for improved BP control.  Rigors and fever to 103  1/7: Fever resolved; stable respiratory status on HFNC.  SvO2 48; getting echocardiogram.  Normal cardiac output; EF 55-60%   1/8: Rapid A Fib  1/9: Titrating down FiO2; improved ABG and VBG     Neuro  1. Encephalopathy; multifactorial; resolved.   2. Acute pain  Plan:  -- PRN acetaminophen, fentanyl, robaxin and oxycodone      CV  1. S/p Type A aortic dissection repair 1/4  2. HTN  3. Low SvO2/ venous oxyhemoglobin of 46  4. Rapid A Fib   Plan:  --CV Surgery and Vascular surgery primary   --Monitor hemodynamics.  Maintain SBP<130  --Amio drip started for rapid A Fib; bolus x 2 without significant improvement.  Converted to SR briefly but now back in sustained rapid A Fib   --EP following for rate control.  No additional agents/interventions thus far.  Consider cardioversion if no improvement in next several days?   --Cardiac meds per CV surgery  --Echocardiogram 1/7; EF 50-55%; IVC not dilated, ventricles small     Resp:  1. Post operative ventilator management; resolved   2. Acute hypoxemic respiratory failure--weak cough, poor inspiratory  effort, ongoing hypoxemia.    Plan:  --HFNC as tolerated. In no respiratory distress.  Oxygenation and O2 requirements signifintly improved this AM   --BiPAP at HS if desaturating.   --Continue Xopenex nebs   --IS hourly while awake as able, up to chair     GI/Nutrition  1. No prior hx  Plan:  --Slowly advance diet as tolerated; advanced to full liquid today     --PPI    Renal  1. IMAN; abdominal aortic dissection involving right renal artery.  Atrophic right kidney with decreased flow. No prior hx.  Baseline creatinine appears to be 0.8; renal function improving daily   2. Hypophosphatemia   Plan:  --Nephrology following.  Renogram 1/7.  Renal US 1/6 with flow present in both R/L renal arteries. Right kidney atrophic.    --monitor function and electrolytes as needed with replacement per ICU protocols.   -- generally avoid nephrotoxic agents such as NSAID, IV contrast unless specifically required  -- adjust medications as needed for renal clearance  -- follow I/O's as appropriate.  --volume replacement as needed; no additional volume given in past 24 hours.  Urine output is adequate.  I&Os do not accurately reflect status as there was a great deal of stool incontinence that was not recorded      ID  1. Fevers with rigors.  Resolved   2. Leukocytosis   Plan:  --andriy-op Cefepime and Vancomycin. Vancomycin complete.  Continue Cefepime in setting of fevers.    --monitor culture data; blood cultures pending. No growth to date.   --Mildly increased leukocytosis today. Monitor fevers and WBCs; recheck procal     Endocrine  1. Stress Hyperglycemia; improved   Plan:  --  BG check Q 4 hours. Medium resistance sliding scale insulin     Heme:  1. Acute blood loss anemia  2. Thrombocytopenia; resolved   Plan:  -- Monitor hemoglobin. Transfuse to keep > 7.0  --Monitor platelet count; continue subcutaneous heparin     MSK  1. Sternotomy   Plan:  --sternal precautions     Skin  1. Surgical incisions   Plan:  --incision care per  protocol     General cares:  DVT Prophylaxis: Heparin SQ  GI Prophylaxis: PPI  Restraints: Restraints for medical healing needed: NO  Family update by me today: Yes   Current lines are required for patient management  Access:  LULÚ Negrete    Time Spent on this Encounter   Billing:  I spent 45 minutes bedside and on the inpatient unit today managing the critical care of Priya Funez in relation to the issues listed in this note.    Interval History   Improved oxygenation this AM and overnight.  Requiring less FiO2 and less flow.   States breathing feels the same.   Still tachycardic.  Denies palpitations.  Did well working with therapies yesterday.  Continue to work on increasing mobility and encouraging pulmonary hygiene.      Physical Exam   Temp: 98.5  F (36.9  C) Temp src: Axillary Temp  Min: 97.6  F (36.4  C)  Max: 98.5  F (36.9  C) BP: 98/73 Pulse: 125 Heart Rate: 126 Resp: 21 SpO2: 92 % O2 Device: High Flow Nasal Cannula (HFNC) Oxygen Delivery: Other (Comments)(30)  Vitals:    01/07/19 0600 01/08/19 0000 01/09/19 0600   Weight: 86 kg (189 lb 9.5 oz) 86.9 kg (191 lb 9.3 oz) 88.7 kg (195 lb 8.8 oz)     I/O last 3 completed shifts:  In: 1536 [P.O.:1080; I.V.:456]  Out: 940 [Urine:840; Stool:100]    GEN: awake, alert, NAD   EYES: PERRL, 3+, Anicteric sclera.   HEENT:  Normocephalic, atraumatic  CV: irregularly irregular tachycardia, no gallops, rubs, or murmurs  PULM/CHEST: Clear breath sounds bilaterally without rhonchi, crackles or wheeze, symmetric chest rise. Improved air exchange   GI: soft, distended, non-tender    : coello catheter in place, urine yellow and clear  EXTREMITIES: trace peripheral edema, moving all extremities, peripheral pulses intact  NEURO: no focal deficits, VERDIN   SKIN: Surgical incisions CDI  PSYCH:  Affect: calm and appropriate   Imaging personally reviewed: no new   ECG: A Fib with RVR       Data   Recent Labs   Lab 01/10/19  0605 01/09/19  0550 01/08/19  1149  01/08/19  0420 01/07/19  1422 01/07/19  0430  01/06/19  0435 01/05/19  0500 01/04/19 2255 01/04/19 2012 01/04/19  1400   WBC 12.1* 9.2  --  6.6  --  8.3  --  10.3 8.8 11.9* 15.1*  --  17.7*   HGB 8.3* 8.7*  --  8.7*  --  8.3*  --  9.1* 8.2* 9.6* 6.9*   < > 13.2   MCV 85 85  --  84  --  85  --  83 86 86 88  --  90    135*  --  101*  --  89*  --  74* 106* 122* 134*  --  227   INR  --   --   --   --   --   --   --  1.46*  --  1.35* 2.03*  --   --     138  --  143 143 143   < > 143 143 144 145*   < > 137   POTASSIUM 4.0 3.9 3.9 3.2* 3.7 3.5   < > 3.5 4.6 3.1* 3.6   < > 3.4   CHLORIDE 108 108  --  111* 115* 112*   < > 111* 109 107 109  --  105   CO2 20 19*  --  21 20 21   < > 21 23 24 21  --  22   BUN 55* 63*  --  59* 54* 55*   < > 39* 25 20 17  --  17   CR 1.44* 1.68*  --  1.65* 1.71* 1.99*   < > 1.72* 1.04 0.83 0.75  --  0.80   ANIONGAP 9 11  --  11 8 10   < > 11 11 13 15*  --  10   BESS 8.2* 7.8*  --  7.6* 7.4* 7.3*   < > 7.3* 7.4* 7.2* 9.2  --  8.8   * 93  --  99 119* 106*   < > 129* 167* 226* 214*  --  125*   ALBUMIN  --   --   --   --  2.8* 3.0*  --   --  3.4 2.6*  --   --  3.6   PROTTOTAL  --   --   --   --   --  5.4*  --   --  5.2* 4.8*  --   --  6.8   BILITOTAL  --   --   --   --   --  1.6*  --   --  1.5* 1.1  --   --  1.3   ALKPHOS  --   --   --   --   --  53  --   --  43 54  --   --  92   ALT  --   --   --   --   --  19  --   --  75* 65*  --   --  24   AST  --   --   --   --   --  51*  --   --  188* 147*  --   --  27   TROPI  --   --   --   --   --   --   --   --   --   --   --   --  <0.015    < > = values in this interval not displayed.

## 2019-01-10 NOTE — PROGRESS NOTES
Patient on HFNC 60% 35LPM throughout the shift. Neb tx given as ordered. Lung sounds clear and diminished. Will continue to follow.    Maurice BRUNNER

## 2019-01-10 NOTE — PROGRESS NOTES
[facility]  Cardiovascular and Thoracic Surgery Daily Note          Assessment and Plan:   POD#6 s/p Left groin cutdown with exposure of the femoral vessels with arterial cannulation for cardiopulmonary bypass. Right groin cutdown with exposure of the femoral vessels with the right femoral artery ultimately used for diagnostic angiography. 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 Visceral angiography. Repair of the bilateral femoral vessels. By Dr. Jose Winslow  -CVS: HR: 120s-130s. SBP: 100s-110s. ASA, BB. Rapid a fib. Converted to NSR on POD#4 for a few hours then returned to rapid a fib and has remained in rapid a fib. Transitioned to oral amiodarone. EP following-- appreciate recs. Will need coumadin at some point.  -Resp: Extubated within protocol. Saturating well on high flow and weaning well. Repeat CXR with possible right pleural effusion vs atelectasis will plan for US thoracentesis to evaluate and treat today. Continue to encourage IS, cough, deep breathing ambulation.   -Neuro:  Grossly intact. Pain controlled.   -Renal: no weight recorded today. Was up 9kg yesterday. I&Os do not reflect fluid status accurately d/t significant prior stool incontinence.  Cr: 1.44<1.68<1.65<1.99. 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 Renogram on POD#3-- atrophic right kidney. Nephrology signed off. Required volume yesterday for hypotension will hold diuretics today and re-evaluate need tomorrow.   -GI:  Tolerating clears. +BM. C diff on POD#4 for water stools- negative. Continue bowel regimen  -:  Barrientos to remain in d/t limited mobility and need for accurate I&Os.   -Endo: pre op a1c: 5.3. Completed insulin gtt per protocol. Continue sliding scale.   -FEN: replace electrolytes as needed. Na: 137. K: 4.0  Orders Placed This Encounter      Advance Diet as Tolerated: Full Liquid  "Diet    -ID: Temp (24hrs), Av.1  F (36.7  C), Min:97.6  F (36.4  C), Max:98.5  F (36.9  C)  WBC: 12.1<9.2. Completed perioperative abx. Fevers on POD #4-- continued on cefepime. Will continue to monitor.   -Heme: Hgb: 8.3. Plt: 151. Acute blood loss and thrombocytopenia related to surgery.   -Proph: PCD, ASA, BB, statin, PPI, sub q heparin  -Dispo: Continue ICU cares. EP following-- appreciate recs for continued a fib with RVR. Continue therapies. Possible US right chest with thoracentesis today          Interval History:   Continues to be in rapid a fib this am. Saturating well on high flow-- able to wean. Pain controlled. Tolerating clears. +BM         Medications:       amiodarone  400 mg Oral BID     aspirin  81 mg Oral Daily     ceFEPIme (MAXIPIME) IV  2 g Intravenous Q12H     heparin  5,000 Units Subcutaneous Q8H     influenza Vac Split High-Dose  0.5 mL Intramuscular Prior to discharge     insulin aspart  1-6 Units Subcutaneous Q4H     levalbuterol  1.25 mg Nebulization Q4H     metoprolol succinate ER  25 mg Oral BID     pantoprazole  40 mg Oral QAM AC     sodium chloride (PF)  3 mL Intracatheter Q8H     acetaminophen, IV fluid REPLACEMENT ONLY, glucose **OR** dextrose **OR** glucagon, fentaNYL, hydrALAZINE, lidocaine 4%, lidocaine (buffered or not buffered), magnesium sulfate, magnesium sulfate, meperidine, methocarbamol, naloxone, ondansetron **OR** ondansetron, oxyCODONE IR, potassium chloride, potassium chloride with lidocaine, potassium chloride, potassium chloride, potassium chloride, sodium chloride (PF), sodium phosphate, sodium phosphate, sodium phosphate, sodium phosphate          Physical Exam:   Vitals were reviewed  Blood pressure 104/79, pulse 135, temperature 98.5  F (36.9  C), temperature source Oral, resp. rate 19, height 1.626 m (5' 4\"), weight 88.7 kg (195 lb 8.8 oz), SpO2 93 %.  Rhythm: a fib RVR    Lungs: diminished    Cardiovascular: irregular    Abdomen: Mild " distension    Extremeties: minimal edema    Incision: CDI    CT: n/a    Weight:   Vitals:    01/05/19 0600 01/06/19 0600 01/07/19 0600 01/08/19 0000   Weight: 84 kg (185 lb 3 oz) 84.5 kg (186 lb 4.6 oz) 86 kg (189 lb 9.5 oz) 86.9 kg (191 lb 9.3 oz)    01/09/19 0600   Weight: 88.7 kg (195 lb 8.8 oz)            Data:   Labs:   Lab Results   Component Value Date    WBC 12.1 01/10/2019     Lab Results   Component Value Date    RBC 2.85 01/10/2019     Lab Results   Component Value Date    HGB 8.3 01/10/2019     Lab Results   Component Value Date    HCT 24.3 01/10/2019     No components found for: MCT  Lab Results   Component Value Date    MCV 85 01/10/2019     Lab Results   Component Value Date    MCH 29.1 01/10/2019     Lab Results   Component Value Date    MCHC 34.2 01/10/2019     Lab Results   Component Value Date    RDW 16.3 01/10/2019     Lab Results   Component Value Date     01/10/2019       Last Basic Metabolic Panel:  Lab Results   Component Value Date     01/10/2019      Lab Results   Component Value Date    POTASSIUM 4.0 01/10/2019     Lab Results   Component Value Date    CHLORIDE 108 01/10/2019     Lab Results   Component Value Date    BESS 8.2 01/10/2019     Lab Results   Component Value Date    CO2 20 01/10/2019     Lab Results   Component Value Date    BUN 55 01/10/2019     Lab Results   Component Value Date    CR 1.44 01/10/2019     Lab Results   Component Value Date     01/10/2019       CXR: 1/9/19    FINDINGS:  Hering[place]-Jaqui catheter has been removed. Mediastinal chest tube  has been removed. No pneumothorax. Basilar opacities consistent with  pleural fluid an associated infiltrate or atelectasis. These have  improved slightly.                                                                      IMPRESSION:  1. No pneumothorax identified.  2. There appear to be small pleural effusions.    Roxana Carbajal PA-C  CV Surgery  Pager # [phone]

## 2019-01-10 NOTE — PROGRESS NOTES
On 30LPM HFNC most of the day.  Placed on BiPAP for a few hours due to increased work of breathing, HR in the 140's.  Work of breathing improved on BiPAP.  Lung sounds diminished.  Faint audible exp. Wheezes.  Scheduled Xopenex nebs given.  Continue to monitor.

## 2019-01-10 NOTE — PROGRESS NOTES
"EP- Cardiology Progress Note           Assessment and Plan:   78-yo F with Hx of HTN, who p/w ascending Ao dissection (s/p repair January 5) and postprocedure was complicated by acute renal failure and Afib with RVR.  Echo revealed normal cardiac function.  Pt continues to be in atrial fibrillation and heart rate is difficult to control despite of amiodarone therapy and metoprolol.  She is completing 36 hours of A. fib after recent conversion to NSR.  Discussed plans with CT surgical team, who agreed with initiation of anticoagulation.  Plan:   1.  Afib.  Start diltiazem drip for better heart rate control.  Continue oral Amio.  We will consider cardioversion tomorrow.  2.  Embolic prevention.  Chads vascular score of 3.  Start heparin drip today.  Physical Exam:  Vitals: /79   Pulse 135   Temp 98.5  F (36.9  C) (Oral)   Resp 19   Ht 1.626 m (5' 4\")   Wt 88.7 kg (195 lb 8.8 oz)   SpO2 93%   BMI 33.57 kg/m        Intake/Output Summary (Last 24 hours) at 1/10/2019 1303  Last data filed at 1/10/2019 1200  Gross per 24 hour   Intake 936 ml   Output 990 ml   Net -54 ml     Vitals:    01/05/19 0600 01/06/19 0600 01/07/19 0600 01/08/19 0000   Weight: 84 kg (185 lb 3 oz) 84.5 kg (186 lb 4.6 oz) 86 kg (189 lb 9.5 oz) 86.9 kg (191 lb 9.3 oz)    01/09/19 0600   Weight: 88.7 kg (195 lb 8.8 oz)       Constitutional:  AAO x3.  Pt is in NAD.  HEAD: Normocephalic.  SKIN: Skin normal color, texture and turgor with no lesions or eruptions.  Eyes: PERRL, EOMI.  ENT:  Supple, normal JVP. No lymphadenopathy or thyroid enlargement.  Chest:  Decrease breath sound in base B/L.  Cardiac:  IIR, variable sound of S1 and Normal S2.  No murmurs rubs or gallop.   Abdomen:  Normal BS.  Soft, non-tender and non-distended.  No rebound or guarding.    Extremities:  Pedious pulses palpable B/L.  No LE edema noticed.   Neurological: Strength and sensation grossly symmetric and intact throughout.                            Review of " Systems:   As per subjective, otherwise 5 systems reviewed and negative.         Medications:          amiodarone  400 mg Oral BID     aspirin  81 mg Oral Daily     ceFEPIme (MAXIPIME) IV  2 g Intravenous Q12H     heparin  5,000 Units Subcutaneous Q8H     influenza Vac Split High-Dose  0.5 mL Intramuscular Prior to discharge     insulin aspart  1-6 Units Subcutaneous Q4H     levalbuterol  1.25 mg Nebulization Q4H     metoprolol succinate ER  25 mg Oral BID     pantoprazole  40 mg Oral QAM AC     sodium chloride (PF)  3 mL Intracatheter Q8H     PRN Meds: acetaminophen, IV fluid REPLACEMENT ONLY, glucose **OR** dextrose **OR** glucagon, fentaNYL, hydrALAZINE, lidocaine 4%, lidocaine (buffered or not buffered), magnesium sulfate, magnesium sulfate, meperidine, methocarbamol, naloxone, ondansetron **OR** ondansetron, oxyCODONE IR, potassium chloride, potassium chloride with lidocaine, potassium chloride, potassium chloride, potassium chloride, sodium chloride (PF), sodium phosphate, sodium phosphate, sodium phosphate, sodium phosphate             Data:     Recent Labs   Lab 01/10/19  0605 01/09/19  0550 01/08/19  1149 01/08/19  0420 01/07/19  1422 01/07/19  0430  01/06/19  0435 01/05/19  0500 01/04/19  2255 01/04/19 2012 01/04/19  1400   WBC 12.1* 9.2  --  6.6  --  8.3  --  10.3 8.8 11.9* 15.1*  --  17.7*   HGB 8.3* 8.7*  --  8.7*  --  8.3*  --  9.1* 8.2* 9.6* 6.9*   < > 13.2   MCV 85 85  --  84  --  85  --  83 86 86 88  --  90    135*  --  101*  --  89*  --  74* 106* 122* 134*  --  227   INR  --   --   --   --   --   --   --  1.46*  --  1.35* 2.03*  --   --     138  --  143 143 143   < > 143 143 144 145*   < > 137   POTASSIUM 4.0 3.9 3.9 3.2* 3.7 3.5   < > 3.5 4.6 3.1* 3.6   < > 3.4   CHLORIDE 108 108  --  111* 115* 112*   < > 111* 109 107 109  --  105   CO2 20 19*  --  21 20 21   < > 21 23 24 21  --  22   BUN 55* 63*  --  59* 54* 55*   < > 39* 25 20 17  --  17   CR 1.44* 1.68*  --  1.65* 1.71* 1.99*   <  > 1.72* 1.04 0.83 0.75  --  0.80   ANIONGAP 9 11  --  11 8 10   < > 11 11 13 15*  --  10   BESS 8.2* 7.8*  --  7.6* 7.4* 7.3*   < > 7.3* 7.4* 7.2* 9.2  --  8.8   * 93  --  99 119* 106*   < > 129* 167* 226* 214*  --  125*   ALBUMIN  --   --   --   --  2.8* 3.0*  --   --  3.4 2.6*  --   --  3.6   PROTTOTAL  --   --   --   --   --  5.4*  --   --  5.2* 4.8*  --   --  6.8   BILITOTAL  --   --   --   --   --  1.6*  --   --  1.5* 1.1  --   --  1.3   ALKPHOS  --   --   --   --   --  53  --   --  43 54  --   --  92   ALT  --   --   --   --   --  19  --   --  75* 65*  --   --  24   AST  --   --   --   --   --  51*  --   --  188* 147*  --   --  27   LIPASE  --   --   --   --   --   --   --   --   --   --   --   --  145   TROPI  --   --   --   --   --   --   --   --   --   --   --   --  <0.015    < > = values in this interval not displayed.

## 2019-01-10 NOTE — PLAN OF CARE
CR/PT: Attempted cardiac rehab session this AM, pt tachy at rest 129-141, attempted LE exercises, however, pt falling asleep and unable to actively participate due to fatigue. Plan is for pt to have thoracentesis today and EP consult for heart rate. Will re-attempt cardiac rehab session in PM.

## 2019-01-10 NOTE — PROGRESS NOTES
Renal Medicine Progress Note            Assessment/Plan:     78 y.o woman with type A aortic dissection, following for ARF.      # Patient with a serum creatinine of 0.8 mg/dl on admission.      # ARF. Scr ~ 1.6-1.9 since surgery. Scr better at 1.44 mg/dl.                -IV contrast on 1/4               -dissection involved the R renal artery               -duplex showed atrophic RK and not much flow into the right kidney and renogram showed 34% function on the RK and 66% function on the left kidney                                                                                                                                    # Type A aortic dissection s/p repair. Dissected all the way down to the abdominal aortic bifurcation into the left and right common iliac arteries.     # Anemia and thrombocytopenia. Pretty Fe def.      #Afib with RVR. BP is soft. On amiodarone and metoprolol     # Pulm. Breathing better today. Still on HF.     Plan.   # Patient's kidney has remained stable. No other recommendations from us. See our NP/PA in 2-4 weeks after discharge. I am signing off. Thank you.  # I discussed the plan with patient and ICU team.        Interval History:      Afebrile. Heart rate remains in the 130s with soft BP. She says she feels a little better today. Her breathing is better.          Medications and Allergies:       amiodarone  400 mg Oral BID     aspirin  81 mg Oral Daily     ceFEPIme (MAXIPIME) IV  2 g Intravenous Q12H     heparin  5,000 Units Subcutaneous Q8H     influenza Vac Split High-Dose  0.5 mL Intramuscular Prior to discharge     insulin aspart  1-6 Units Subcutaneous Q4H     levalbuterol  1.25 mg Nebulization Q4H     metoprolol tartrate  25 mg Oral BID     pantoprazole  40 mg Oral QAM AC     sodium chloride (PF)  3 mL Intracatheter Q8H        Allergies   Allergen Reactions     Amoxicillin Swelling     Ciprofloxacin Swelling            Physical Exam:   Vitals were reviewed  Heart Rate: 126,  "Blood pressure 108/76, pulse 125, temperature 98.3  F (36.8  C), temperature source Oral, resp. rate 15, height 1.626 m (5' 4\"), weight 88.7 kg (195 lb 8.8 oz), SpO2 95 %.    Wt Readings from Last 3 Encounters:   01/09/19 88.7 kg (195 lb 8.8 oz)   09/14/08 80.7 kg (178 lb)       Intake/Output Summary (Last 24 hours) at 1/10/2019 0815  Last data filed at 1/10/2019 0600  Gross per 24 hour   Intake 1416 ml   Output 880 ml   Net 536 ml       GENERAL APPEARANCE: pleasant, NAD, alert  HEENT:  Eyes/ears/nose/neck grossly normal  RESP: Good airflow anteriorly. No wheezes. On HF.   CV: irregular, irregular, tachy  ABDOMEN: obese, soft, NT  EXTREMITIES/SKIN: no rashes/lesions on observed skin; not much edema in the legs, <1+ edema thighs and back.  Neuro: a/o x 3             Data:     CBC RESULTS:     Recent Labs   Lab 01/10/19  0605 01/09/19  0550 01/08/19  0420 01/07/19  0430 01/06/19  0435 01/05/19  0500   WBC 12.1* 9.2 6.6 8.3 10.3 8.8   RBC 2.85* 3.00* 3.03* 2.89* 3.19* 2.90*   HGB 8.3* 8.7* 8.7* 8.3* 9.1* 8.2*   HCT 24.3* 25.4* 25.5* 24.6* 26.6* 24.9*    135* 101* 89* 74* 106*       Basic Metabolic Panel:  Recent Labs   Lab 01/10/19  0605 01/09/19  0550 01/08/19  1149 01/08/19  0420 01/07/19  1422 01/07/19  0430 01/06/19  1247    138  --  143 143 143 145*   POTASSIUM 4.0 3.9 3.9 3.2* 3.7 3.5 4.0   CHLORIDE 108 108  --  111* 115* 112* 114*   CO2 20 19*  --  21 20 21 21   BUN 55* 63*  --  59* 54* 55* 44*   CR 1.44* 1.68*  --  1.65* 1.71* 1.99* 1.71*   * 93  --  99 119* 106* 130*   BESS 8.2* 7.8*  --  7.6* 7.4* 7.3* 7.5*       INR  Recent Labs   Lab 01/06/19  0435 01/04/19  2255 01/04/19 2012   INR 1.46* 1.35* 2.03*      Attestation:   I have reviewed today's relevant vital signs, notes, medications, labs and imaging.    Darwin Peterson MD  [facility] - Nephrology  Office phone :[phone]  Pager: [phone]  "

## 2019-01-10 NOTE — CONSULTS
"NUTRITION ASSESSMENT      REASON FOR ASSESSMENT:  Cardiac Surgery Nutrition Consult    CURRENT DIET / INTAKE:  Full Liquid + Gelatein Plus BID btw meals  MD had ordered for pt to eventually receive a Low Residue, High Glutamine diet.    Poor oral intake.  Pt has only taken bites and sips today.  I sent a Gelatein Plus Supplement (pineapple flavor) today, but she disliked.  She has had limited nutritional intake over the past 1 week.    Nutrition History:  Pt reports that she does most of the shopping and cooking at home.  She has 3 meals per day + snacks with usual good appetite and intake.  She states she thinks she has gained some weight over the past year due to eating too many sweets.    ANTHROPOMETRICS:   Ht: 5 ft 4 in  Admit Wt:  84 kg kg    Current Wt:  88.7 kg (up 4.7 kg since admit)  BMI:  31.8  IBW:  54.5 kg  Weight Status: Obesity Grade I BMI 30-34.9  %IBW:  154%    MALNUTRITION:  Patient does not meet two of the following criteria necessary for diagnosing malnutrition:  significant weight loss, reduced intake, subcutaneous fat loss, muscle loss or fluid retention  - Pt has had decreased nutritional intake since admission x 7 days.    NUTRITION DIAGNOSIS:   Inadequate oral intake R/t decreased appetite and early satiety AEB poor oral intake since admission x 7 days.    INTERVENTIONS:    Nutrition Prescription:  Full Liquid diet + Alan supplement BID (10 am and dinner)  - Alan contains the following ingredients for wound healing:  HMB, Arginine, Glutamine, Hydrolyzed collagen, and Micronutrients (Zinc, Vit C, E, and B12).    Implementation:  Nutrition Education (Content):  Discussed the importance of adequate nutrition post-op for healing and energy, emphasizing protein foods, and encouraged patient to order a protein food at each meal.  - Discussed importance of intake of Alan and rationale for patient.  Provided handout \"Helping Wounds Heal.\"    Goals:  Patient to consume ~75% at meals and supplements " in the next 3 - 5 days    Follow Up/Monitoring (InPatient):  Food and Fluid intake -  Monitor for adequacy    Follow Up/Monitoring (OutPatient):  Patient will participate in out-patient cardiac rehab and attend nutrition classes during the program    Nazanin Todd RD, LD, CNSC

## 2019-01-10 NOTE — PLAN OF CARE
Discharge Planner PT   Patient plan for discharge: rehab  Current status: Pt with increased heart rate today and requiring more oxygen, on BiPAP during session, marbella bed level LE exercises. Pt's -133 throughout, O2 sats in mid to high 90's on BiPAP, /76 before exercises and 106/89 after exercise.  Barriers to return to prior living situation: Medical acuity, current level of assist, decreased strength, decreased activity tolerance  Recommendations for discharge: Acute rehab  Rationale for recommendations: Pt is below baseline level of function would benefit from intensive multidisciplinary inpatient rehabilitation program to promote optimal functional recovery, appears to have supportive family.       Entered by: Venita Benjamin 01/10/2019 3:42 PM

## 2019-01-11 ENCOUNTER — SURGERY (OUTPATIENT)
Age: 79
End: 2019-01-11
Payer: COMMERCIAL

## 2019-01-11 ENCOUNTER — APPOINTMENT (OUTPATIENT)
Dept: ULTRASOUND IMAGING | Facility: CLINIC | Age: 79
DRG: 003 | End: 2019-01-11
Payer: COMMERCIAL

## 2019-01-11 ENCOUNTER — APPOINTMENT (OUTPATIENT)
Dept: PHYSICAL THERAPY | Facility: CLINIC | Age: 79
DRG: 003 | End: 2019-01-11
Payer: COMMERCIAL

## 2019-01-11 LAB
ANION GAP SERPL CALCULATED.3IONS-SCNC: 12 MMOL/L (ref 3–14)
ANISOCYTOSIS BLD QL SMEAR: SLIGHT
BASOPHILS # BLD AUTO: 0 10E9/L (ref 0–0.2)
BASOPHILS NFR BLD AUTO: 0 %
BUN SERPL-MCNC: 53 MG/DL (ref 7–30)
CALCIUM SERPL-MCNC: 8.1 MG/DL (ref 8.5–10.1)
CHLORIDE SERPL-SCNC: 108 MMOL/L (ref 94–109)
CO2 SERPL-SCNC: 17 MMOL/L (ref 20–32)
CREAT SERPL-MCNC: 1.44 MG/DL (ref 0.52–1.04)
DIFFERENTIAL METHOD BLD: ABNORMAL
EOSINOPHIL # BLD AUTO: 0.3 10E9/L (ref 0–0.7)
EOSINOPHIL NFR BLD AUTO: 2 %
ERYTHROCYTE [DISTWIDTH] IN BLOOD BY AUTOMATED COUNT: 16 % (ref 10–15)
GFR SERPL CREATININE-BSD FRML MDRD: 35 ML/MIN/{1.73_M2}
GLUCOSE BLDC GLUCOMTR-MCNC: 75 MG/DL (ref 70–99)
GLUCOSE BLDC GLUCOMTR-MCNC: 89 MG/DL (ref 70–99)
GLUCOSE BLDC GLUCOMTR-MCNC: 90 MG/DL (ref 70–99)
GLUCOSE BLDC GLUCOMTR-MCNC: 91 MG/DL (ref 70–99)
GLUCOSE BLDC GLUCOMTR-MCNC: 92 MG/DL (ref 70–99)
GLUCOSE BLDC GLUCOMTR-MCNC: 94 MG/DL (ref 70–99)
GLUCOSE BLDC GLUCOMTR-MCNC: 97 MG/DL (ref 70–99)
GLUCOSE SERPL-MCNC: 89 MG/DL (ref 70–99)
HCT VFR BLD AUTO: 24.1 % (ref 35–47)
HGB BLD-MCNC: 8.2 G/DL (ref 11.7–15.7)
INR PPP: 1.28 (ref 0.86–1.14)
LYMPHOCYTES # BLD AUTO: 0.6 10E9/L (ref 0.8–5.3)
LYMPHOCYTES NFR BLD AUTO: 4 %
MAGNESIUM SERPL-MCNC: 2.6 MG/DL (ref 1.6–2.3)
MCH RBC QN AUTO: 28.8 PG (ref 26.5–33)
MCHC RBC AUTO-ENTMCNC: 34 G/DL (ref 31.5–36.5)
MCV RBC AUTO: 85 FL (ref 78–100)
METAMYELOCYTES # BLD: 0.2 10E9/L
METAMYELOCYTES NFR BLD MANUAL: 1 %
MICROCYTES BLD QL SMEAR: PRESENT
MONOCYTES # BLD AUTO: 1.9 10E9/L (ref 0–1.3)
MONOCYTES NFR BLD AUTO: 12 %
NEUTROPHILS # BLD AUTO: 12.6 10E9/L (ref 1.6–8.3)
NEUTROPHILS NFR BLD AUTO: 81 %
PHOSPHATE SERPL-MCNC: 3.1 MG/DL (ref 2.5–4.5)
PLATELET # BLD AUTO: 186 10E9/L (ref 150–450)
PLATELET # BLD EST: ABNORMAL 10*3/UL
POLYCHROMASIA BLD QL SMEAR: SLIGHT
POTASSIUM SERPL-SCNC: 4.1 MMOL/L (ref 3.4–5.3)
POTASSIUM SERPL-SCNC: 4.3 MMOL/L (ref 3.4–5.3)
POTASSIUM SERPL-SCNC: 4.3 MMOL/L (ref 3.4–5.3)
PROCALCITONIN SERPL-MCNC: 2.02 NG/ML
RBC # BLD AUTO: 2.85 10E12/L (ref 3.8–5.2)
SODIUM SERPL-SCNC: 137 MMOL/L (ref 133–144)
WBC # BLD AUTO: 15.6 10E9/L (ref 4–11)

## 2019-01-11 PROCEDURE — 27210995 ZZH RX 272: Performed by: SURGERY

## 2019-01-11 PROCEDURE — 4A0234Z MEASUREMENT OF CARDIAC ELECTRICAL ACTIVITY, PERCUTANEOUS APPROACH: ICD-10-PCS | Performed by: INTERNAL MEDICINE

## 2019-01-11 PROCEDURE — 80048 BASIC METABOLIC PNL TOTAL CA: CPT | Performed by: NURSE PRACTITIONER

## 2019-01-11 PROCEDURE — 93005 ELECTROCARDIOGRAM TRACING: CPT

## 2019-01-11 PROCEDURE — 0JH604Z INSERTION OF PACEMAKER, SINGLE CHAMBER INTO CHEST SUBCUTANEOUS TISSUE AND FASCIA, OPEN APPROACH: ICD-10-PCS | Performed by: INTERNAL MEDICINE

## 2019-01-11 PROCEDURE — 40000809 ZZH STATISTIC NO DOCUMENTATION TO SUPPORT CHARGE: Mod: 76

## 2019-01-11 PROCEDURE — 84132 ASSAY OF SERUM POTASSIUM: CPT | Performed by: NURSE PRACTITIONER

## 2019-01-11 PROCEDURE — 33207 INSERT HEART PM VENTRICULAR: CPT | Mod: KX | Performed by: INTERNAL MEDICINE

## 2019-01-11 PROCEDURE — 25000132 ZZH RX MED GY IP 250 OP 250 PS 637: Performed by: INTERNAL MEDICINE

## 2019-01-11 PROCEDURE — 83735 ASSAY OF MAGNESIUM: CPT | Performed by: NURSE PRACTITIONER

## 2019-01-11 PROCEDURE — 25000128 H RX IP 250 OP 636: Performed by: THORACIC SURGERY (CARDIOTHORACIC VASCULAR SURGERY)

## 2019-01-11 PROCEDURE — 32555 ASPIRATE PLEURA W/ IMAGING: CPT

## 2019-01-11 PROCEDURE — 25000132 ZZH RX MED GY IP 250 OP 250 PS 637: Performed by: STUDENT IN AN ORGANIZED HEALTH CARE EDUCATION/TRAINING PROGRAM

## 2019-01-11 PROCEDURE — P9047 ALBUMIN (HUMAN), 25%, 50ML: HCPCS | Performed by: SURGERY

## 2019-01-11 PROCEDURE — 25000125 ZZHC RX 250: Performed by: RADIOLOGY

## 2019-01-11 PROCEDURE — 20000003 ZZH R&B ICU

## 2019-01-11 PROCEDURE — 25000125 ZZHC RX 250: Performed by: INTERNAL MEDICINE

## 2019-01-11 PROCEDURE — 84100 ASSAY OF PHOSPHORUS: CPT | Performed by: NURSE PRACTITIONER

## 2019-01-11 PROCEDURE — 25000128 H RX IP 250 OP 636: Performed by: INTERNAL MEDICINE

## 2019-01-11 PROCEDURE — 94640 AIRWAY INHALATION TREATMENT: CPT | Mod: 76

## 2019-01-11 PROCEDURE — 02HK3JZ INSERTION OF PACEMAKER LEAD INTO RIGHT VENTRICLE, PERCUTANEOUS APPROACH: ICD-10-PCS | Performed by: INTERNAL MEDICINE

## 2019-01-11 PROCEDURE — 40000281 ZZH STATISTIC TRANSPORT TIME EA 15 MIN

## 2019-01-11 PROCEDURE — C1894 INTRO/SHEATH, NON-LASER: HCPCS | Performed by: INTERNAL MEDICINE

## 2019-01-11 PROCEDURE — 40000193 ZZH STATISTIC PT WARD VISIT

## 2019-01-11 PROCEDURE — 25000125 ZZHC RX 250: Performed by: NURSE PRACTITIONER

## 2019-01-11 PROCEDURE — C1898 LEAD, PMKR, OTHER THAN TRANS: HCPCS | Performed by: INTERNAL MEDICINE

## 2019-01-11 PROCEDURE — 94640 AIRWAY INHALATION TREATMENT: CPT

## 2019-01-11 PROCEDURE — 40000275 ZZH STATISTIC RCP TIME EA 10 MIN

## 2019-01-11 PROCEDURE — 36415 COLL VENOUS BLD VENIPUNCTURE: CPT | Performed by: NURSE PRACTITIONER

## 2019-01-11 PROCEDURE — 27210794 ZZH OR GENERAL SUPPLY STERILE: Performed by: INTERNAL MEDICINE

## 2019-01-11 PROCEDURE — 93650 ICAR CATH ABLTJ AV NODE FUNC: CPT | Performed by: INTERNAL MEDICINE

## 2019-01-11 PROCEDURE — 25000132 ZZH RX MED GY IP 250 OP 250 PS 637: Performed by: PHYSICIAN ASSISTANT

## 2019-01-11 PROCEDURE — 27210995 ZZH RX 272: Performed by: INTERNAL MEDICINE

## 2019-01-11 PROCEDURE — 25000128 H RX IP 250 OP 636: Performed by: SURGERY

## 2019-01-11 PROCEDURE — 97110 THERAPEUTIC EXERCISES: CPT | Mod: GP

## 2019-01-11 PROCEDURE — 94660 CPAP INITIATION&MGMT: CPT

## 2019-01-11 PROCEDURE — 02583ZZ DESTRUCTION OF CONDUCTION MECHANISM, PERCUTANEOUS APPROACH: ICD-10-PCS | Performed by: INTERNAL MEDICINE

## 2019-01-11 PROCEDURE — 85025 COMPLETE CBC W/AUTO DIFF WBC: CPT | Performed by: NURSE PRACTITIONER

## 2019-01-11 PROCEDURE — 4A023FZ MEASUREMENT OF CARDIAC RHYTHM, PERCUTANEOUS APPROACH: ICD-10-PCS | Performed by: INTERNAL MEDICINE

## 2019-01-11 PROCEDURE — 84145 PROCALCITONIN (PCT): CPT | Performed by: NURSE PRACTITIONER

## 2019-01-11 PROCEDURE — 85610 PROTHROMBIN TIME: CPT | Performed by: NURSE PRACTITIONER

## 2019-01-11 PROCEDURE — 25000128 H RX IP 250 OP 636: Performed by: STUDENT IN AN ORGANIZED HEALTH CARE EDUCATION/TRAINING PROGRAM

## 2019-01-11 PROCEDURE — 99152 MOD SED SAME PHYS/QHP 5/>YRS: CPT | Performed by: INTERNAL MEDICINE

## 2019-01-11 PROCEDURE — C1888 ENDOVAS NON-CARDIAC ABL CATH: HCPCS | Performed by: INTERNAL MEDICINE

## 2019-01-11 PROCEDURE — 00000146 ZZHCL STATISTIC GLUCOSE BY METER IP

## 2019-01-11 PROCEDURE — 99233 SBSQ HOSP IP/OBS HIGH 50: CPT | Performed by: NURSE PRACTITIONER

## 2019-01-11 PROCEDURE — 99233 SBSQ HOSP IP/OBS HIGH 50: CPT | Mod: 25 | Performed by: INTERNAL MEDICINE

## 2019-01-11 PROCEDURE — C1786 PMKR, SINGLE, RATE-RESP: HCPCS | Performed by: INTERNAL MEDICINE

## 2019-01-11 PROCEDURE — 93010 ELECTROCARDIOGRAM REPORT: CPT | Performed by: INTERNAL MEDICINE

## 2019-01-11 DEVICE — IMP LEAD PACING BIPOLAR CAPSUREFIX NOVUS 52CM 5076-52
Type: IMPLANTABLE DEVICE | Status: NON-FUNCTIONAL
Removed: 2023-07-31

## 2019-01-11 DEVICE — PACEMAKER AZURE XT SR MRI SYS
Type: IMPLANTABLE DEVICE | Status: NON-FUNCTIONAL
Removed: 2023-07-31

## 2019-01-11 RX ORDER — SODIUM CHLORIDE 450 MG/100ML
INJECTION, SOLUTION INTRAVENOUS CONTINUOUS
Status: DISCONTINUED | OUTPATIENT
Start: 2019-01-11 | End: 2019-01-12

## 2019-01-11 RX ORDER — NALOXONE HYDROCHLORIDE 0.4 MG/ML
.1-.4 INJECTION, SOLUTION INTRAMUSCULAR; INTRAVENOUS; SUBCUTANEOUS
Status: DISCONTINUED | OUTPATIENT
Start: 2019-01-11 | End: 2019-01-12

## 2019-01-11 RX ORDER — LIDOCAINE HYDROCHLORIDE 10 MG/ML
10-30 INJECTION, SOLUTION EPIDURAL; INFILTRATION; INTRACAUDAL; PERINEURAL
Status: COMPLETED | OUTPATIENT
Start: 2019-01-11 | End: 2019-01-11

## 2019-01-11 RX ORDER — PROTAMINE SULFATE 10 MG/ML
5-40 INJECTION, SOLUTION INTRAVENOUS EVERY 10 MIN PRN
Status: DISCONTINUED | OUTPATIENT
Start: 2019-01-11 | End: 2019-01-11 | Stop reason: HOSPADM

## 2019-01-11 RX ORDER — DEXTROSE MONOHYDRATE 25 G/50ML
25-50 INJECTION, SOLUTION INTRAVENOUS
Status: DISCONTINUED | OUTPATIENT
Start: 2019-01-11 | End: 2019-01-11

## 2019-01-11 RX ORDER — FLUMAZENIL 0.1 MG/ML
0.2 INJECTION, SOLUTION INTRAVENOUS
Status: DISCONTINUED | OUTPATIENT
Start: 2019-01-11 | End: 2019-01-11 | Stop reason: HOSPADM

## 2019-01-11 RX ORDER — LIDOCAINE HYDROCHLORIDE AND EPINEPHRINE 10; 10 MG/ML; UG/ML
10-30 INJECTION, SOLUTION INFILTRATION; PERINEURAL
Status: DISCONTINUED | OUTPATIENT
Start: 2019-01-11 | End: 2019-01-11 | Stop reason: HOSPADM

## 2019-01-11 RX ORDER — MORPHINE SULFATE 2 MG/ML
1-2 INJECTION, SOLUTION INTRAMUSCULAR; INTRAVENOUS EVERY 5 MIN PRN
Status: DISCONTINUED | OUTPATIENT
Start: 2019-01-11 | End: 2019-01-11 | Stop reason: HOSPADM

## 2019-01-11 RX ORDER — LIDOCAINE 40 MG/G
CREAM TOPICAL
Status: DISCONTINUED | OUTPATIENT
Start: 2019-01-11 | End: 2019-01-14

## 2019-01-11 RX ORDER — NALOXONE HYDROCHLORIDE 0.4 MG/ML
0.4 INJECTION, SOLUTION INTRAMUSCULAR; INTRAVENOUS; SUBCUTANEOUS EVERY 5 MIN PRN
Status: DISCONTINUED | OUTPATIENT
Start: 2019-01-11 | End: 2019-01-11 | Stop reason: HOSPADM

## 2019-01-11 RX ORDER — HEPARIN SODIUM 1000 [USP'U]/ML
1000-10000 INJECTION, SOLUTION INTRAVENOUS; SUBCUTANEOUS EVERY 5 MIN PRN
Status: DISCONTINUED | OUTPATIENT
Start: 2019-01-11 | End: 2019-01-11 | Stop reason: HOSPADM

## 2019-01-11 RX ORDER — BUPIVACAINE HYDROCHLORIDE 2.5 MG/ML
10-30 INJECTION, SOLUTION EPIDURAL; INFILTRATION; INTRACAUDAL
Status: COMPLETED | OUTPATIENT
Start: 2019-01-11 | End: 2019-01-11

## 2019-01-11 RX ORDER — DOBUTAMINE HYDROCHLORIDE 200 MG/100ML
5-40 INJECTION INTRAVENOUS CONTINUOUS PRN
Status: DISCONTINUED | OUTPATIENT
Start: 2019-01-11 | End: 2019-01-11 | Stop reason: HOSPADM

## 2019-01-11 RX ORDER — PROTAMINE SULFATE 10 MG/ML
1-5 INJECTION, SOLUTION INTRAVENOUS
Status: DISCONTINUED | OUTPATIENT
Start: 2019-01-11 | End: 2019-01-11 | Stop reason: HOSPADM

## 2019-01-11 RX ORDER — FUROSEMIDE 10 MG/ML
20-100 INJECTION INTRAMUSCULAR; INTRAVENOUS
Status: DISCONTINUED | OUTPATIENT
Start: 2019-01-11 | End: 2019-01-11 | Stop reason: HOSPADM

## 2019-01-11 RX ORDER — LIDOCAINE 40 MG/G
CREAM TOPICAL
Status: DISCONTINUED | OUTPATIENT
Start: 2019-01-11 | End: 2019-01-11

## 2019-01-11 RX ORDER — OXYCODONE AND ACETAMINOPHEN 5; 325 MG/1; MG/1
1 TABLET ORAL EVERY 4 HOURS PRN
Status: DISCONTINUED | OUTPATIENT
Start: 2019-01-11 | End: 2019-01-17

## 2019-01-11 RX ORDER — SODIUM CHLORIDE 450 MG/100ML
INJECTION, SOLUTION INTRAVENOUS CONTINUOUS
Status: DISCONTINUED | OUTPATIENT
Start: 2019-01-11 | End: 2019-01-11 | Stop reason: HOSPADM

## 2019-01-11 RX ORDER — POTASSIUM CHLORIDE 1500 MG/1
20 TABLET, EXTENDED RELEASE ORAL
Status: DISCONTINUED | OUTPATIENT
Start: 2019-01-11 | End: 2019-01-11 | Stop reason: HOSPADM

## 2019-01-11 RX ORDER — FENTANYL CITRATE 50 UG/ML
25-50 INJECTION, SOLUTION INTRAMUSCULAR; INTRAVENOUS
Status: DISCONTINUED | OUTPATIENT
Start: 2019-01-11 | End: 2019-01-11 | Stop reason: HOSPADM

## 2019-01-11 RX ORDER — ATROPINE SULFATE 0.1 MG/ML
.5-1 INJECTION INTRAVENOUS
Status: DISCONTINUED | OUTPATIENT
Start: 2019-01-11 | End: 2019-01-11 | Stop reason: HOSPADM

## 2019-01-11 RX ORDER — MAGNESIUM SULFATE HEPTAHYDRATE 40 MG/ML
2 INJECTION, SOLUTION INTRAVENOUS
Status: DISCONTINUED | OUTPATIENT
Start: 2019-01-11 | End: 2019-01-11 | Stop reason: HOSPADM

## 2019-01-11 RX ORDER — CLINDAMYCIN PHOSPHATE 900 MG/50ML
900 INJECTION, SOLUTION INTRAVENOUS
Status: DISCONTINUED | OUTPATIENT
Start: 2019-01-11 | End: 2019-01-11 | Stop reason: HOSPADM

## 2019-01-11 RX ORDER — LIDOCAINE 40 MG/G
CREAM TOPICAL
Status: DISCONTINUED | OUTPATIENT
Start: 2019-01-11 | End: 2019-01-11 | Stop reason: HOSPADM

## 2019-01-11 RX ORDER — DIPHENHYDRAMINE HYDROCHLORIDE 50 MG/ML
25-50 INJECTION INTRAMUSCULAR; INTRAVENOUS
Status: DISCONTINUED | OUTPATIENT
Start: 2019-01-11 | End: 2019-01-11 | Stop reason: HOSPADM

## 2019-01-11 RX ORDER — IBUTILIDE FUMARATE 1 MG/10ML
1 INJECTION, SOLUTION INTRAVENOUS
Status: DISCONTINUED | OUTPATIENT
Start: 2019-01-11 | End: 2019-01-11 | Stop reason: HOSPADM

## 2019-01-11 RX ORDER — LIDOCAINE HYDROCHLORIDE 10 MG/ML
10 INJECTION, SOLUTION EPIDURAL; INFILTRATION; INTRACAUDAL; PERINEURAL ONCE
Status: DISCONTINUED | OUTPATIENT
Start: 2019-01-11 | End: 2019-01-29 | Stop reason: HOSPADM

## 2019-01-11 RX ORDER — POTASSIUM CHLORIDE 1500 MG/1
40 TABLET, EXTENDED RELEASE ORAL
Status: DISCONTINUED | OUTPATIENT
Start: 2019-01-11 | End: 2019-01-11 | Stop reason: HOSPADM

## 2019-01-11 RX ORDER — LIDOCAINE HYDROCHLORIDE 10 MG/ML
10-30 INJECTION, SOLUTION EPIDURAL; INFILTRATION; INTRACAUDAL; PERINEURAL
Status: DISCONTINUED | OUTPATIENT
Start: 2019-01-11 | End: 2019-01-11 | Stop reason: HOSPADM

## 2019-01-11 RX ORDER — ADENOSINE 3 MG/ML
6-12 INJECTION, SOLUTION INTRAVENOUS EVERY 5 MIN PRN
Status: DISCONTINUED | OUTPATIENT
Start: 2019-01-11 | End: 2019-01-11 | Stop reason: HOSPADM

## 2019-01-11 RX ORDER — KETOROLAC TROMETHAMINE 30 MG/ML
15 INJECTION, SOLUTION INTRAMUSCULAR; INTRAVENOUS
Status: DISCONTINUED | OUTPATIENT
Start: 2019-01-11 | End: 2019-01-11 | Stop reason: HOSPADM

## 2019-01-11 RX ORDER — FENTANYL CITRATE 50 UG/ML
INJECTION, SOLUTION INTRAMUSCULAR; INTRAVENOUS
Status: DISCONTINUED
Start: 2019-01-11 | End: 2019-01-11 | Stop reason: HOSPADM

## 2019-01-11 RX ORDER — ALBUMIN (HUMAN) 12.5 G/50ML
SOLUTION INTRAVENOUS
Status: DISCONTINUED
Start: 2019-01-11 | End: 2019-01-12 | Stop reason: HOSPADM

## 2019-01-11 RX ORDER — NALOXONE HYDROCHLORIDE 0.4 MG/ML
.1-.4 INJECTION, SOLUTION INTRAMUSCULAR; INTRAVENOUS; SUBCUTANEOUS
Status: DISCONTINUED | OUTPATIENT
Start: 2019-01-11 | End: 2019-01-29 | Stop reason: HOSPADM

## 2019-01-11 RX ORDER — LORAZEPAM 2 MG/ML
.5-2 INJECTION INTRAMUSCULAR EVERY 10 MIN PRN
Status: DISCONTINUED | OUTPATIENT
Start: 2019-01-11 | End: 2019-01-11 | Stop reason: HOSPADM

## 2019-01-11 RX ORDER — LIDOCAINE HYDROCHLORIDE 10 MG/ML
10 INJECTION, SOLUTION EPIDURAL; INFILTRATION; INTRACAUDAL; PERINEURAL ONCE
Status: COMPLETED | OUTPATIENT
Start: 2019-01-11 | End: 2019-01-11

## 2019-01-11 RX ORDER — OXYCODONE AND ACETAMINOPHEN 5; 325 MG/1; MG/1
1 TABLET ORAL EVERY 4 HOURS PRN
Status: DISCONTINUED | OUTPATIENT
Start: 2019-01-11 | End: 2019-01-12

## 2019-01-11 RX ORDER — NICOTINE POLACRILEX 4 MG
15-30 LOZENGE BUCCAL
Status: DISCONTINUED | OUTPATIENT
Start: 2019-01-11 | End: 2019-01-11

## 2019-01-11 RX ORDER — ALBUMIN (HUMAN) 12.5 G/50ML
50 SOLUTION INTRAVENOUS ONCE
Status: COMPLETED | OUTPATIENT
Start: 2019-01-11 | End: 2019-01-11

## 2019-01-11 RX ORDER — IBUTILIDE FUMARATE 1 MG/10ML
0.01 INJECTION, SOLUTION INTRAVENOUS
Status: DISCONTINUED | OUTPATIENT
Start: 2019-01-11 | End: 2019-01-11 | Stop reason: HOSPADM

## 2019-01-11 RX ORDER — ONDANSETRON 2 MG/ML
4 INJECTION INTRAMUSCULAR; INTRAVENOUS EVERY 4 HOURS PRN
Status: DISCONTINUED | OUTPATIENT
Start: 2019-01-11 | End: 2019-01-11 | Stop reason: HOSPADM

## 2019-01-11 RX ORDER — BUPIVACAINE HYDROCHLORIDE 2.5 MG/ML
10-30 INJECTION, SOLUTION EPIDURAL; INFILTRATION; INTRACAUDAL
Status: DISCONTINUED | OUTPATIENT
Start: 2019-01-11 | End: 2019-01-11 | Stop reason: HOSPADM

## 2019-01-11 RX ADMIN — LEVALBUTEROL HYDROCHLORIDE 1.25 MG: 1.25 SOLUTION, CONCENTRATE RESPIRATORY (INHALATION) at 15:52

## 2019-01-11 RX ADMIN — HEPARIN SODIUM 800 UNITS/HR: 10000 INJECTION, SOLUTION INTRAVENOUS at 09:01

## 2019-01-11 RX ADMIN — AMIODARONE HYDROCHLORIDE 400 MG: 200 TABLET ORAL at 09:00

## 2019-01-11 RX ADMIN — CEFEPIME HYDROCHLORIDE 2 G: 2 INJECTION, POWDER, FOR SOLUTION INTRAVENOUS at 09:00

## 2019-01-11 RX ADMIN — MIDAZOLAM 0.5 MG: 1 INJECTION INTRAMUSCULAR; INTRAVENOUS at 15:00

## 2019-01-11 RX ADMIN — OXYCODONE HYDROCHLORIDE 5 MG: 5 TABLET ORAL at 09:00

## 2019-01-11 RX ADMIN — ALBUMIN HUMAN 50 G: 0.25 SOLUTION INTRAVENOUS at 20:53

## 2019-01-11 RX ADMIN — LIDOCAINE HYDROCHLORIDE 10 ML: 10 INJECTION, SOLUTION EPIDURAL; INFILTRATION; INTRACAUDAL; PERINEURAL at 16:20

## 2019-01-11 RX ADMIN — LEVALBUTEROL HYDROCHLORIDE 1.25 MG: 1.25 SOLUTION, CONCENTRATE RESPIRATORY (INHALATION) at 19:05

## 2019-01-11 RX ADMIN — LIDOCAINE HYDROCHLORIDE 10 ML: 10 INJECTION, SOLUTION EPIDURAL; INFILTRATION; INTRACAUDAL; PERINEURAL at 17:01

## 2019-01-11 RX ADMIN — SODIUM CHLORIDE: 4.5 INJECTION, SOLUTION INTRAVENOUS at 22:00

## 2019-01-11 RX ADMIN — METOPROLOL SUCCINATE 25 MG: 25 TABLET, EXTENDED RELEASE ORAL at 09:00

## 2019-01-11 RX ADMIN — LEVALBUTEROL HYDROCHLORIDE 1.25 MG: 1.25 SOLUTION, CONCENTRATE RESPIRATORY (INHALATION) at 23:15

## 2019-01-11 RX ADMIN — FENTANYL CITRATE 25 MCG: 50 INJECTION, SOLUTION INTRAMUSCULAR; INTRAVENOUS at 15:02

## 2019-01-11 RX ADMIN — LEVALBUTEROL HYDROCHLORIDE 1.25 MG: 1.25 SOLUTION, CONCENTRATE RESPIRATORY (INHALATION) at 07:20

## 2019-01-11 RX ADMIN — ASPIRIN 81 MG: 81 TABLET, COATED ORAL at 21:40

## 2019-01-11 RX ADMIN — PANTOPRAZOLE SODIUM 40 MG: 40 TABLET, DELAYED RELEASE ORAL at 09:00

## 2019-01-11 RX ADMIN — CEFEPIME HYDROCHLORIDE 2 G: 2 INJECTION, POWDER, FOR SOLUTION INTRAVENOUS at 19:56

## 2019-01-11 RX ADMIN — BUPIVACAINE HYDROCHLORIDE 50 MG: 2.5 INJECTION, SOLUTION EPIDURAL; INFILTRATION; INTRACAUDAL at 14:57

## 2019-01-11 RX ADMIN — Medication 25000 UNITS: at 15:18

## 2019-01-11 RX ADMIN — LIDOCAINE HYDROCHLORIDE 20 ML: 10 INJECTION, SOLUTION EPIDURAL; INFILTRATION; INTRACAUDAL; PERINEURAL at 14:56

## 2019-01-11 RX ADMIN — LEVALBUTEROL HYDROCHLORIDE 1.25 MG: 1.25 SOLUTION, CONCENTRATE RESPIRATORY (INHALATION) at 03:00

## 2019-01-11 RX ADMIN — LEVALBUTEROL HYDROCHLORIDE 1.25 MG: 1.25 SOLUTION, CONCENTRATE RESPIRATORY (INHALATION) at 11:22

## 2019-01-11 RX ADMIN — HEPARIN SODIUM 5000 UNITS: 10000 INJECTION, SOLUTION INTRAVENOUS; SUBCUTANEOUS at 04:05

## 2019-01-11 RX ADMIN — SODIUM CHLORIDE: 4.5 INJECTION, SOLUTION INTRAVENOUS at 11:42

## 2019-01-11 ASSESSMENT — ACTIVITIES OF DAILY LIVING (ADL)
ADLS_ACUITY_SCORE: 12
ADLS_ACUITY_SCORE: 13
ADLS_ACUITY_SCORE: 13
ADLS_ACUITY_SCORE: 14
ADLS_ACUITY_SCORE: 14
ADLS_ACUITY_SCORE: 12

## 2019-01-11 NOTE — PROGRESS NOTES
"EP- Cardiology Progress Note           Assessment and Plan:     78-yo F with Hx of HTN, who p/w ascending Ao dissection (s/p repair January 5) and postprocedure was complicated by acute renal failure and Afib with RVR.    Pt continues to be in A. fib with RVR, and medical therapy has been limited to to low blood pressure levels.  We initially plan to have ALEX/cardioversion today however her respiratory status is tenuous and I do not believe she would tolerate ALEX.      I had an extensive discussion with family about the possibility of AV node ablation and pacemaker implantation.  I explained the procedure details understand there is a 1-2% risk in case associated procedure.  They would like to move for the procedure.  Consent was signed.    Plan:   1.  Afib.  Plan for pacemaker and AV node ablation today.  Will discontinue amiodarone.  2.  Embolic prevention.  Chads vascular score of 3.    Stop heparin.  Recommend holding heparin for at least 48 hours after device implanted and potentially indefinitely.    Physical Exam:  Vitals: /73   Pulse 151   Temp 99  F (37.2  C) (Oral)   Resp 20   Ht 1.626 m (5' 4\")   Wt 88.7 kg (195 lb 8.8 oz)   SpO2 96%   BMI 33.57 kg/m        Intake/Output Summary (Last 24 hours) at 1/11/2019 1155  Last data filed at 1/11/2019 1030  Gross per 24 hour   Intake 481.87 ml   Output 1140 ml   Net -658.13 ml     Vitals:    01/05/19 0600 01/06/19 0600 01/07/19 0600 01/08/19 0000   Weight: 84 kg (185 lb 3 oz) 84.5 kg (186 lb 4.6 oz) 86 kg (189 lb 9.5 oz) 86.9 kg (191 lb 9.3 oz)    01/09/19 0600   Weight: 88.7 kg (195 lb 8.8 oz)       Constitutional:  AAO x3.  Pt is in NAD.  HEAD: Normocephalic.  SKIN: Skin normal color, texture and turgor with no lesions or eruptions.  Eyes: PERRL, EOMI.  ENT:  Supple, normal JVP. No lymphadenopathy or thyroid enlargement.  Chest:  Decrease breath sound in base B/L.  Cardiac:  IIR, variable sound of S1 and Normal S2. No murmurs rubs or gallop.  "   Abdomen:  Normal BS.  Soft, non-tender and non-distended.  No rebound or guarding.    Extremities:  Pedious pulses palpable B/L.  No LE edema noticed.   Neurological: Strength and sensation grossly symmetric and intact throughout.                            Review of Systems:   As per subjective, otherwise 5 systems reviewed and negative.         Medications:          amiodarone  400 mg Oral BID     aspirin  81 mg Oral Daily     ceFEPIme (MAXIPIME) IV  2 g Intravenous Q12H     clindamycin  900 mg Intravenous Pre-Op/Pre-procedure x 1 dose     influenza Vac Split High-Dose  0.5 mL Intramuscular Prior to discharge     insulin aspart  1-6 Units Subcutaneous Q4H     levalbuterol  1.25 mg Nebulization Q4H     lidocaine (PF)  10 mL Subcutaneous Once     metoprolol succinate ER  25 mg Oral BID     pantoprazole  40 mg Oral QAM AC     sodium chloride (PF)  3 mL Intracatheter Q8H     sodium chloride (PF)  3 mL Intracatheter Q8H     PRN Meds: acetaminophen, IV fluid REPLACEMENT ONLY, glucose **OR** dextrose **OR** glucagon, fentaNYL, - MEDICATION INSTRUCTIONS -, HOLD MEDICATION, HOLD MEDICATION, HOLD MEDICATION, HOLD MEDICATION, HOLD MEDICATION, hydrALAZINE, lidocaine 4%, lidocaine 4%, lidocaine (buffered or not buffered), magnesium sulfate, magnesium sulfate, magnesium sulfate, - MEDICATION INSTRUCTIONS -, - MEDICATION INSTRUCTIONS -, meperidine, methocarbamol, naloxone, ondansetron **OR** ondansetron, oxyCODONE IR, potassium chloride, potassium chloride with lidocaine, potassium chloride, potassium chloride, potassium chloride ER, potassium chloride, potassium chloride ER, sodium chloride (PF), sodium chloride (PF), sodium phosphate, sodium phosphate, sodium phosphate, sodium phosphate             Data:     Recent Labs   Lab 01/11/19  1046 01/11/19  0903 01/11/19  0515 01/10/19  0605 01/09/19  0550  01/07/19  1422 01/07/19  0430  01/06/19  0435 01/05/19  0500 01/04/19  2255  01/04/19  1400   WBC  --  15.6*  --  12.1* 9.2    < >  --  8.3  --  10.3 8.8 11.9*   < > 17.7*   HGB  --  8.2*  --  8.3* 8.7*   < >  --  8.3*  --  9.1* 8.2* 9.6*   < > 13.2   MCV  --  85  --  85 85   < >  --  85  --  83 86 86   < > 90   PLT  --  186  --  151 135*   < >  --  89*  --  74* 106* 122*   < > 227   INR 1.28*  --   --   --   --   --   --   --   --  1.46*  --  1.35*   < >  --    NA  --  137  --  137 138   < > 143 143   < > 143 143 144   < > 137   POTASSIUM 4.3 4.3 4.1 4.0 3.9   < > 3.7 3.5   < > 3.5 4.6 3.1*   < > 3.4   CHLORIDE  --  108  --  108 108   < > 115* 112*   < > 111* 109 107   < > 105   CO2  --  17*  --  20 19*   < > 20 21   < > 21 23 24   < > 22   BUN  --  53*  --  55* 63*   < > 54* 55*   < > 39* 25 20   < > 17   CR  --  1.44*  --  1.44* 1.68*   < > 1.71* 1.99*   < > 1.72* 1.04 0.83   < > 0.80   ANIONGAP  --  12  --  9 11   < > 8 10   < > 11 11 13   < > 10   BESS  --  8.1*  --  8.2* 7.8*   < > 7.4* 7.3*   < > 7.3* 7.4* 7.2*   < > 8.8   GLC  --  89  --  105* 93   < > 119* 106*   < > 129* 167* 226*   < > 125*   ALBUMIN  --   --   --   --   --   --  2.8* 3.0*  --   --  3.4 2.6*  --  3.6   PROTTOTAL  --   --   --   --   --   --   --  5.4*  --   --  5.2* 4.8*  --  6.8   BILITOTAL  --   --   --   --   --   --   --  1.6*  --   --  1.5* 1.1  --  1.3   ALKPHOS  --   --   --   --   --   --   --  53  --   --  43 54  --  92   ALT  --   --   --   --   --   --   --  19  --   --  75* 65*  --  24   AST  --   --   --   --   --   --   --  51*  --   --  188* 147*  --  27   LIPASE  --   --   --   --   --   --   --   --   --   --   --   --   --  145   TROPI  --   --   --   --   --   --   --   --   --   --   --   --   --  <0.015    < > = values in this interval not displayed.

## 2019-01-11 NOTE — PROVIDER NOTIFICATION
Per Dr. Yusuf's note from 1/10 pt was to be started on a heparin drip for cardio version today.  Drip was not ordered hence not started.  This writer notified Sommer Morrison Intensivist NP who ordered drip.  This was started at 0900.  Dr. Yusuf and family have decided on placing PPM instead of cardioversion.  Heparin drip was d//c'd as this procedure will occur this afternoon to be followed by a thoracentesis.  This writer confirmed that heparin drip will not resume after procedures.  It will be up to CV surgery to order anticoagulant in the future if needed.

## 2019-01-11 NOTE — PROGRESS NOTES
[facility]  Cardiovascular and Thoracic Surgery Daily Note          Assessment and Plan:   POD#7 s/p Left groin cutdown with exposure of the femoral vessels with arterial cannulation for cardiopulmonary bypass. Right groin cutdown with exposure of the femoral vessels with the right femoral artery ultimately used for diagnostic angiography. 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 Visceral angiography. Repair of the bilateral femoral vessels. By Dr. Jose Winslow  -CVS: HR: 120s-130s. SBP: 100s-110s. ASA, BB. Rapid a fib. Converted to NSR on POD#4 for a few hours then returned to rapid a fib and has remained in rapid a fib. Transitioned to oral amiodarone. EP following-- will get ablation with PPM today.  -Resp: Extubated within protocol. Saturating well on high flow and weaning well. CXR showing minimal fluid with atelectasis. Continue to encourage IS, cough, deep breathing ambulation.   -Neuro:  Grossly intact. Pain controlled.   -Renal: no weight recorded today. Needs to be weighed. I&Os do not reflect fluid status accurately d/t significant prior stool incontinence.  Cr: 1.44. 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 Renogram on POD#3-- atrophic right kidney. Nephrology signed off. Required volume yesterday for hypotension will hold diuretics today and re-evaluate need tomorrow.   -GI:  Tolerating clears. +BM. C diff on POD#4 for water stools- negative. Continue bowel regimen  -:  Barrientos to remain in d/t limited mobility and need for accurate I&Os.   -Endo: pre op a1c: 5.3. Completed insulin gtt per protocol. Continue sliding scale.   -FEN: replace electrolytes as needed. Na: 137. K: 4.3  Orders Placed This Encounter      Advance Diet as Tolerated: Full Liquid Diet     -ID: Temp (24hrs), Av.5  F (36.9  C), Min:97.9  F (36.6  C), Max:99  F (37.2  C),  WBC: 15.6<12.1<9.2.  "Completed perioperative abx. Fevers on POD #4-- continued on cefepime. Will continue to monitor.   -Heme: Hgb: 8.2. Plt: 186. Acute blood loss and thrombocytopenia related to surgery.   -Proph: PCD, ASA, BB, statin, PPI, sub q heparin  -Dispo: Continue ICU cares. EP following-- appreciate recs for continued a fib with RVR. Continue therapies.          Interval History:   Sitting up in bed, appears to be breathing fine, pain controlled          Medications:       amiodarone  400 mg Oral BID     aspirin  81 mg Oral Daily     ceFEPIme (MAXIPIME) IV  2 g Intravenous Q12H     clindamycin  900 mg Intravenous Pre-Op/Pre-procedure x 1 dose     influenza Vac Split High-Dose  0.5 mL Intramuscular Prior to discharge     insulin aspart  1-6 Units Subcutaneous Q4H     levalbuterol  1.25 mg Nebulization Q4H     lidocaine (PF)  10 mL Subcutaneous Once     metoprolol succinate ER  25 mg Oral BID     pantoprazole  40 mg Oral QAM AC     sodium chloride (PF)  3 mL Intracatheter Q8H     sodium chloride (PF)  3 mL Intracatheter Q8H     sodium chloride (PF)  3 mL Intracatheter Q8H     @MEDSPRN-@          Physical Exam:   Vitals were reviewed  Blood pressure 113/75, pulse 141, temperature 98.7  F (37.1  C), temperature source Axillary, resp. rate 22, height 1.626 m (5' 4\"), weight 88.7 kg (195 lb 8.8 oz), SpO2 96 %.  Rhythm: irreg    Lungs: diminished bases    Cardiovascular: s1/s2, no m/r/g    Abdomen: s/nt/nd    Extremeties: no LE edema    Incision: cdi    CT: na    Weight:   Vitals:    01/05/19 0600 01/06/19 0600 01/07/19 0600 01/08/19 0000   Weight: 84 kg (185 lb 3 oz) 84.5 kg (186 lb 4.6 oz) 86 kg (189 lb 9.5 oz) 86.9 kg (191 lb 9.3 oz)    01/09/19 0600   Weight: 88.7 kg (195 lb 8.8 oz)            Data:   Labs:   Lab Results   Component Value Date    WBC 15.6 01/11/2019     Lab Results   Component Value Date    RBC 2.85 01/11/2019     Lab Results   Component Value Date    HGB 8.2 01/11/2019     Lab Results   Component Value Date    " HCT 24.1 01/11/2019     No components found for: MCT  Lab Results   Component Value Date    MCV 85 01/11/2019     Lab Results   Component Value Date    MCH 28.8 01/11/2019     Lab Results   Component Value Date    MCHC 34.0 01/11/2019     Lab Results   Component Value Date    RDW 16.0 01/11/2019     Lab Results   Component Value Date     01/11/2019       Last Basic Metabolic Panel:  Lab Results   Component Value Date     01/11/2019      Lab Results   Component Value Date    POTASSIUM 4.3 01/11/2019     Lab Results   Component Value Date    CHLORIDE 108 01/11/2019     Lab Results   Component Value Date    BESS 8.1 01/11/2019     Lab Results   Component Value Date    CO2 17 01/11/2019     Lab Results   Component Value Date    BUN 53 01/11/2019     Lab Results   Component Value Date    CR 1.44 01/11/2019     Lab Results   Component Value Date    GLC 89 01/11/2019       Shania June PA-C  Pager #: [phone]

## 2019-01-11 NOTE — PLAN OF CARE
Discharge Planner PT   Patient plan for discharge: None stated at this time  Current status: Pt with HR in 130's at rest, marbella supine LE exercises with frequent rests due to HR increasing to 160's with exercises. Further activity held at this time due to HR. Pt's -168 during session, O2 91-92% on oxymizer, /77 pre exercises and 113/77 after exercises. Pt to have pacemaker placed today.  Barriers to return to prior living situation: Medical acuity, decreased activity tolerance, decreased strength, current level of assist  Recommendations for discharge: ARU  Rationale for recommendations: Pt is below baseline level of function, would benefit from intensive inpatient rehabilitation program.       Entered by: Venita Benjamin 01/11/2019 10:38 AM

## 2019-01-11 NOTE — PROVIDER NOTIFICATION
Pt given HS metoprolol and amio po doses with BP maintained. Pt remains afib rvr with 130-150's HR and asymptomatic. CV surgery on call Dr. Wharton notified and agree to hold off on diltizem gtt at this time. Also continue with cardiology plan of cardioversion tomorrow. Will continue to monitor and report back as needed.

## 2019-01-11 NOTE — PROGRESS NOTES
Patient has been on BIPAP 14/8 50% throughout the shift. Skin intact, gel pad and mepilex applied. Alarm set to level 8. Neb tx given as ordered. Will continue to follow and wean as tolerated.    Maurice BRUNNER

## 2019-01-11 NOTE — PROGRESS NOTES
[facility]  Critical Care Service  Progress Note  Date of Service (when I saw the patient): 01/11/2019     Main Plans for Today    Ablation and PPM placement   HFNC as tolerated   Continue to progress mobility and diet   Discuss antimicrobial regimen with CV surgery; narrow if able     Assessment & Plan   Priya DAMON Armin is a 78 year old female with PMH of HTN admitted on 1/4/2019 with sudden onset of back pain found to have type A aortic dissection and sent for emergency repair.  She is POD#3 for aortic dissection repair with graft. Bypass time was 114min. Net I/O was 3.9L. She was admitted to ICU for ongoing care.     1/5: did well overnight.  Epi weaned off; requiring low dose phenylephrine.  Hgb stable. Extubated in the afternoon.   1/6:poor inspiratory effort and weak cough through the day.  Placed on Bipap overnight.  NTG gtt for improved BP control.  Rigors and fever to 103  1/7: Fever resolved; stable respiratory status on HFNC.  SvO2 48; getting echocardiogram.  Normal cardiac output; EF 55-60%   1/8: Rapid A Fib  1/9: Titrating down FiO2; improved ABG and VBG     Neuro  1. Encephalopathy; multifactorial; resolved.   2. Acute pain  Plan:  -- PRN acetaminophen, fentanyl, robaxin and oxycodone      CV  1. S/p Type A aortic dissection repair 1/4  2. HTN  3. Low SvO2/ venous oxyhemoglobin of 46  4. Rapid A Fib   Plan:  --CV Surgery and Vascular surgery primary   --Monitor hemodynamics.  Maintain SBP<130  --Amio drip started for rapid A Fib; bolus x 2 without significant improvement.  Converted to SR briefly but now back in sustained rapid A Fib. Transitioned to oral amio and no improvement in rate thus far.     --EP following: Plan for ablation and PPM placement today   --Cardiac meds per CV surgery  --Echocardiogram 1/7; EF 50-55%; IVC not dilated, ventricles small     Resp:  1. Post operative ventilator management; resolved   2. Acute hypoxemic respiratory failure--weak cough, poor  inspiratory effort, ongoing hypoxemia.    Plan:  --HFNC as tolerated. In no respiratory distress.  Oxygenation and O2 requirements unchanged this AM   --BiPAP at HS if desaturating or PRN for rest periods.   --Continue Xopenex nebs   --IS hourly while awake as able, up to chair     GI/Nutrition  1. No prior hx  Plan:  --Slowly advance diet as tolerated; advanced to full liquid. Suboptimal nutrition since surgery   --PPI  --Having BMs     Renal  1. IMAN; abdominal aortic dissection involving right renal artery.  Atrophic right kidney with decreased flow. No prior hx.  Baseline creatinine appears to be 0.8; renal function improving daily   2. Hypophosphatemia   Plan:  --Nephrology following.  Renogram 1/7.  Renal US 1/6 with flow present in both R/L renal arteries. Right kidney atrophic.    --monitor function and electrolytes as needed with replacement per ICU protocols.   -- generally avoid nephrotoxic agents such as NSAID, IV contrast unless specifically required  -- adjust medications as needed for renal clearance  -- follow I/O's as appropriate.  - Urine output is adequate.  I&Os do not accurately reflect status as there was a great deal of stool incontinence that was not recorded. Consider diuretic if BP more stable following PPM placement today     ID  1. Fevers with rigors.  Resolved   2. Leukocytosis; persistent    Plan:  --andriy-op Cefepime and Vancomycin. Vancomycin complete. Cefepime has been continued in setting of leukocytosis.  Day 5 today; plan to complete 7 day course   --blood cultures with no growth  --Increasing leukocytosis. Monitor fevers and WBCs; procalcitonin trending down.      Endocrine  1. Stress Hyperglycemia; resolved   Plan:  --  BG check Q 4 hours. Medium resistance sliding scale insulin. Has not been requiring      Heme:  1. Acute blood loss anemia  2. Thrombocytopenia; resolved   Plan:  -- Monitor hemoglobin. Transfuse to keep > 7.0  --Monitor platelet count; continue subcutaneous  heparin     MSK  1. Sternotomy   Plan:  --sternal precautions     Skin  1. Surgical incisions   Plan:  --incision care per protocol     General cares:  DVT Prophylaxis: Heparin SQ  GI Prophylaxis: PPI  Restraints: Restraints for medical healing needed: NO  Family update by me today: Yes   Current lines are required for patient management  Access:  LULÚ Negrete    Time Spent on this Encounter   Billing:  I spent 45 minutes bedside and on the inpatient unit today managing the critical care of Priya Funez in relation to the issues listed in this note.    Interval History   Required BiPAP yesterday afternoon and continued overnight.  Now tolerating HFNC this AM.  Remains in rapid A fib.  No improvement in rate.  Plan for ablation and PPM today. States she feels about the same.      Physical Exam   Temp: 99  F (37.2  C) Temp src: Oral Temp  Min: 97.9  F (36.6  C)  Max: 99  F (37.2  C) BP: 113/73 Pulse: 151 Heart Rate: 138 Resp: 20 SpO2: 96 % O2 Device: High Flow Nasal Cannula (HFNC) Oxygen Delivery: Other (Comments)(35lpm)  Vitals:    01/07/19 0600 01/08/19 0000 01/09/19 0600   Weight: 86 kg (189 lb 9.5 oz) 86.9 kg (191 lb 9.3 oz) 88.7 kg (195 lb 8.8 oz)     I/O last 3 completed shifts:  In: 590 [P.O.:240; I.V.:350]  Out: 1085 [Urine:985; Stool:100]    GEN: awake, alert, NAD   EYES: PERRL, 3+, Anicteric sclera.   HEENT:  Normocephalic, atraumatic  CV: irregularly irregular tachycardia, no gallops, rubs, or murmurs  PULM/CHEST: Clear breath sounds bilaterally without rhonchi, crackles or wheeze, symmetric chest rise.  GI: soft, distended, non-tender    : coello catheter in place, urine yellow and clear  EXTREMITIES: 1+ peripheral edema, moving all extremities, peripheral pulses intact  NEURO: no focal deficits, VERDIN   SKIN: Surgical incisions CDI  PSYCH:  Affect: calm and appropriate   Imaging personally reviewed: CXR    ECG: A Fib with RVR       Data   Recent Labs   Lab 01/11/19  1046 01/11/19  0903  01/11/19  0515 01/10/19  0605 01/09/19  0550  01/07/19  1422 01/07/19  0430  01/06/19  0435 01/05/19  0500 01/04/19  2255  01/04/19  1400   WBC  --  15.6*  --  12.1* 9.2   < >  --  8.3  --  10.3 8.8 11.9*   < > 17.7*   HGB  --  8.2*  --  8.3* 8.7*   < >  --  8.3*  --  9.1* 8.2* 9.6*   < > 13.2   MCV  --  85  --  85 85   < >  --  85  --  83 86 86   < > 90   PLT  --  186  --  151 135*   < >  --  89*  --  74* 106* 122*   < > 227   INR 1.28*  --   --   --   --   --   --   --   --  1.46*  --  1.35*   < >  --    NA  --  137  --  137 138   < > 143 143   < > 143 143 144   < > 137   POTASSIUM 4.3 4.3 4.1 4.0 3.9   < > 3.7 3.5   < > 3.5 4.6 3.1*   < > 3.4   CHLORIDE  --  108  --  108 108   < > 115* 112*   < > 111* 109 107   < > 105   CO2  --  17*  --  20 19*   < > 20 21   < > 21 23 24   < > 22   BUN  --  53*  --  55* 63*   < > 54* 55*   < > 39* 25 20   < > 17   CR  --  1.44*  --  1.44* 1.68*   < > 1.71* 1.99*   < > 1.72* 1.04 0.83   < > 0.80   ANIONGAP  --  12  --  9 11   < > 8 10   < > 11 11 13   < > 10   BESS  --  8.1*  --  8.2* 7.8*   < > 7.4* 7.3*   < > 7.3* 7.4* 7.2*   < > 8.8   GLC  --  89  --  105* 93   < > 119* 106*   < > 129* 167* 226*   < > 125*   ALBUMIN  --   --   --   --   --   --  2.8* 3.0*  --   --  3.4 2.6*  --  3.6   PROTTOTAL  --   --   --   --   --   --   --  5.4*  --   --  5.2* 4.8*  --  6.8   BILITOTAL  --   --   --   --   --   --   --  1.6*  --   --  1.5* 1.1  --  1.3   ALKPHOS  --   --   --   --   --   --   --  53  --   --  43 54  --  92   ALT  --   --   --   --   --   --   --  19  --   --  75* 65*  --  24   AST  --   --   --   --   --   --   --  51*  --   --  188* 147*  --  27   TROPI  --   --   --   --   --   --   --   --   --   --   --   --   --  <0.015    < > = values in this interval not displayed.

## 2019-01-11 NOTE — PLAN OF CARE
Pt on BiPAP 50%. Oxy given for pain with relief. A-fib w/RVR. Pt & family updated on POC. Will cont to miracle pt.

## 2019-01-11 NOTE — PLAN OF CARE
A&Ox4 no neuro changes and VERDIN. Pt remains afib rvr in 130-150's at times-CV surg, Cardiology aware (Cardioversion planned for today) with amio po and metoprolol po, bp maintained map>65. UO adequate. Pt remains on bipap through night with diminshed lung sounds, minimal time off bipap for swallowing pillls. Pt explained care, given choices and reassured.

## 2019-01-11 NOTE — PROGRESS NOTES
In preparation for the thoracentesis, a 4 hour hold on the heparin is required per radiology protocol.  Orders placed.  Called to notify ICU.  Nurse will check with cardiologist on the temporary hold.

## 2019-01-12 ENCOUNTER — APPOINTMENT (OUTPATIENT)
Dept: GENERAL RADIOLOGY | Facility: CLINIC | Age: 79
DRG: 003 | End: 2019-01-12
Payer: COMMERCIAL

## 2019-01-12 ENCOUNTER — ANESTHESIA EVENT (OUTPATIENT)
Dept: INTENSIVE CARE | Facility: CLINIC | Age: 79
DRG: 003 | End: 2019-01-12
Payer: COMMERCIAL

## 2019-01-12 ENCOUNTER — ANESTHESIA (OUTPATIENT)
Dept: INTENSIVE CARE | Facility: CLINIC | Age: 79
DRG: 003 | End: 2019-01-12
Payer: COMMERCIAL

## 2019-01-12 LAB
ABO + RH BLD: NORMAL
ABO + RH BLD: NORMAL
ALBUMIN SERPL-MCNC: 2.7 G/DL (ref 3.4–5)
ALBUMIN SERPL-MCNC: 2.8 G/DL (ref 3.4–5)
ALBUMIN SERPL-MCNC: 2.9 G/DL (ref 3.4–5)
ALBUMIN UR-MCNC: 30 MG/DL
ALP SERPL-CCNC: 76 U/L (ref 40–150)
ALP SERPL-CCNC: 81 U/L (ref 40–150)
ALP SERPL-CCNC: 81 U/L (ref 40–150)
ALT SERPL W P-5'-P-CCNC: 26 U/L (ref 0–50)
ALT SERPL W P-5'-P-CCNC: 28 U/L (ref 0–50)
ALT SERPL W P-5'-P-CCNC: 30 U/L (ref 0–50)
ANION GAP SERPL CALCULATED.3IONS-SCNC: 13 MMOL/L (ref 3–14)
ANION GAP SERPL CALCULATED.3IONS-SCNC: 14 MMOL/L (ref 3–14)
ANION GAP SERPL CALCULATED.3IONS-SCNC: 15 MMOL/L (ref 3–14)
ANION GAP SERPL CALCULATED.3IONS-SCNC: 16 MMOL/L (ref 3–14)
APPEARANCE UR: ABNORMAL
AST SERPL W P-5'-P-CCNC: 41 U/L (ref 0–45)
AST SERPL W P-5'-P-CCNC: 53 U/L (ref 0–45)
AST SERPL W P-5'-P-CCNC: 54 U/L (ref 0–45)
BACTERIA SPEC CULT: NO GROWTH
BACTERIA SPEC CULT: NO GROWTH
BASE DEFICIT BLDA-SCNC: 11.3 MMOL/L
BASE DEFICIT BLDA-SCNC: 6.2 MMOL/L
BASE DEFICIT BLDA-SCNC: 8.6 MMOL/L
BILIRUB DIRECT SERPL-MCNC: 1 MG/DL (ref 0–0.2)
BILIRUB SERPL-MCNC: 1.6 MG/DL (ref 0.2–1.3)
BILIRUB SERPL-MCNC: 2.3 MG/DL (ref 0.2–1.3)
BILIRUB SERPL-MCNC: 2.3 MG/DL (ref 0.2–1.3)
BILIRUB UR QL STRIP: NEGATIVE
BLD GP AB SCN SERPL QL: NORMAL
BLD PROD TYP BPU: NORMAL
BLD PROD TYP BPU: NORMAL
BLD UNIT ID BPU: 0
BLOOD BANK CMNT PATIENT-IMP: NORMAL
BLOOD PRODUCT CODE: NORMAL
BPU ID: NORMAL
BUN SERPL-MCNC: 59 MG/DL (ref 7–30)
BUN SERPL-MCNC: 61 MG/DL (ref 7–30)
BUN SERPL-MCNC: 61 MG/DL (ref 7–30)
BUN SERPL-MCNC: 62 MG/DL (ref 7–30)
CA-I BLD-MCNC: 4.1 MG/DL (ref 4.4–5.2)
CA-I SERPL ISE-MCNC: 4.3 MG/DL (ref 4.4–5.2)
CALCIUM SERPL-MCNC: 7.3 MG/DL (ref 8.5–10.1)
CALCIUM SERPL-MCNC: 7.8 MG/DL (ref 8.5–10.1)
CALCIUM SERPL-MCNC: 7.9 MG/DL (ref 8.5–10.1)
CALCIUM SERPL-MCNC: 8.1 MG/DL (ref 8.5–10.1)
CHLORIDE SERPL-SCNC: 108 MMOL/L (ref 94–109)
CHLORIDE SERPL-SCNC: 108 MMOL/L (ref 94–109)
CHLORIDE SERPL-SCNC: 110 MMOL/L (ref 94–109)
CHLORIDE SERPL-SCNC: 111 MMOL/L (ref 94–109)
CO2 SERPL-SCNC: 14 MMOL/L (ref 20–32)
CO2 SERPL-SCNC: 14 MMOL/L (ref 20–32)
CO2 SERPL-SCNC: 15 MMOL/L (ref 20–32)
CO2 SERPL-SCNC: 16 MMOL/L (ref 20–32)
CO2 SERPL-SCNC: 20 MMOL/L (ref 20–32)
COLOR UR AUTO: ABNORMAL
CREAT SERPL-MCNC: 1.54 MG/DL (ref 0.52–1.04)
CREAT SERPL-MCNC: 1.67 MG/DL (ref 0.52–1.04)
CREAT SERPL-MCNC: 1.74 MG/DL (ref 0.52–1.04)
CREAT SERPL-MCNC: 1.75 MG/DL (ref 0.52–1.04)
CREAT UR-MCNC: 104 MG/DL
ERYTHROCYTE [DISTWIDTH] IN BLOOD BY AUTOMATED COUNT: 15.6 % (ref 10–15)
ERYTHROCYTE [DISTWIDTH] IN BLOOD BY AUTOMATED COUNT: 15.7 % (ref 10–15)
ERYTHROCYTE [DISTWIDTH] IN BLOOD BY AUTOMATED COUNT: 15.8 % (ref 10–15)
ERYTHROCYTE [DISTWIDTH] IN BLOOD BY AUTOMATED COUNT: 16 % (ref 10–15)
FRACT EXCRET NA UR+SERPL-RTO: <0.1 %
GFR SERPL CREATININE-BSD FRML MDRD: 28 ML/MIN/{1.73_M2}
GFR SERPL CREATININE-BSD FRML MDRD: 28 ML/MIN/{1.73_M2}
GFR SERPL CREATININE-BSD FRML MDRD: 29 ML/MIN/{1.73_M2}
GFR SERPL CREATININE-BSD FRML MDRD: 32 ML/MIN/{1.73_M2}
GLUCOSE BLDC GLUCOMTR-MCNC: 102 MG/DL (ref 70–99)
GLUCOSE BLDC GLUCOMTR-MCNC: 104 MG/DL (ref 70–99)
GLUCOSE BLDC GLUCOMTR-MCNC: 111 MG/DL (ref 70–99)
GLUCOSE BLDC GLUCOMTR-MCNC: 90 MG/DL (ref 70–99)
GLUCOSE BLDC GLUCOMTR-MCNC: 94 MG/DL (ref 70–99)
GLUCOSE SERPL-MCNC: 101 MG/DL (ref 70–99)
GLUCOSE SERPL-MCNC: 102 MG/DL (ref 70–99)
GLUCOSE SERPL-MCNC: 103 MG/DL (ref 70–99)
GLUCOSE SERPL-MCNC: 96 MG/DL (ref 70–99)
GLUCOSE UR STRIP-MCNC: NEGATIVE MG/DL
HCO3 BLD-SCNC: 13 MMOL/L (ref 21–28)
HCO3 BLD-SCNC: 15 MMOL/L (ref 21–28)
HCO3 BLD-SCNC: 18 MMOL/L (ref 21–28)
HCT VFR BLD AUTO: 20.5 % (ref 35–47)
HCT VFR BLD AUTO: 22.9 % (ref 35–47)
HCT VFR BLD AUTO: 24.9 % (ref 35–47)
HCT VFR BLD AUTO: 26.3 % (ref 35–47)
HGB BLD-MCNC: 7 G/DL (ref 11.7–15.7)
HGB BLD-MCNC: 8 G/DL (ref 11.7–15.7)
HGB BLD-MCNC: 8.7 G/DL (ref 11.7–15.7)
HGB BLD-MCNC: 9.1 G/DL (ref 11.7–15.7)
HGB UR QL STRIP: ABNORMAL
HYALINE CASTS #/AREA URNS LPF: 1 /LPF (ref 0–2)
KETONES UR STRIP-MCNC: 5 MG/DL
LACTATE BLD-SCNC: 1.4 MMOL/L (ref 0.7–2)
LACTATE BLD-SCNC: 2 MMOL/L (ref 0.7–2)
LACTATE BLD-SCNC: NORMAL MMOL/L (ref 0.7–2)
LEUKOCYTE ESTERASE UR QL STRIP: NEGATIVE
LMWH PPP CHRO-ACNC: <0.1 IU/ML
Lab: NORMAL
Lab: NORMAL
MAGNESIUM SERPL-MCNC: 3.1 MG/DL (ref 1.6–2.3)
MAGNESIUM SERPL-MCNC: 3.3 MG/DL (ref 1.6–2.3)
MCH RBC QN AUTO: 28.9 PG (ref 26.5–33)
MCH RBC QN AUTO: 29.2 PG (ref 26.5–33)
MCH RBC QN AUTO: 29.2 PG (ref 26.5–33)
MCH RBC QN AUTO: 29.5 PG (ref 26.5–33)
MCHC RBC AUTO-ENTMCNC: 34.1 G/DL (ref 31.5–36.5)
MCHC RBC AUTO-ENTMCNC: 34.6 G/DL (ref 31.5–36.5)
MCHC RBC AUTO-ENTMCNC: 34.9 G/DL (ref 31.5–36.5)
MCHC RBC AUTO-ENTMCNC: 34.9 G/DL (ref 31.5–36.5)
MCV RBC AUTO: 84 FL (ref 78–100)
MCV RBC AUTO: 84 FL (ref 78–100)
MCV RBC AUTO: 85 FL (ref 78–100)
MCV RBC AUTO: 85 FL (ref 78–100)
MUCOUS THREADS #/AREA URNS LPF: PRESENT /LPF
NITRATE UR QL: NEGATIVE
NT-PROBNP SERPL-MCNC: ABNORMAL PG/ML (ref 0–1800)
NUM BPU REQUESTED: 1
O2/TOTAL GAS SETTING VFR VENT: 50 %
OXYHGB MFR BLD: 91 % (ref 92–100)
OXYHGB MFR BLD: 95 % (ref 92–100)
OXYHGB MFR BLD: 95 % (ref 92–100)
PCO2 BLD: 22 MM HG (ref 35–45)
PCO2 BLD: 23 MM HG (ref 35–45)
PCO2 BLD: 30 MM HG (ref 35–45)
PH BLD: 7.36 PH (ref 7.35–7.45)
PH BLD: 7.39 PH (ref 7.35–7.45)
PH BLD: 7.41 PH (ref 7.35–7.45)
PH UR STRIP: 5 PH (ref 5–7)
PHOSPHATE SERPL-MCNC: 3.9 MG/DL (ref 2.5–4.5)
PHOSPHATE SERPL-MCNC: 3.9 MG/DL (ref 2.5–4.5)
PLATELET # BLD AUTO: 171 10E9/L (ref 150–450)
PLATELET # BLD AUTO: 176 10E9/L (ref 150–450)
PLATELET # BLD AUTO: 199 10E9/L (ref 150–450)
PLATELET # BLD AUTO: 200 10E9/L (ref 150–450)
PO2 BLD: 63 MM HG (ref 80–105)
PO2 BLD: 75 MM HG (ref 80–105)
PO2 BLD: 77 MM HG (ref 80–105)
POTASSIUM SERPL-SCNC: 3.5 MMOL/L (ref 3.4–5.3)
POTASSIUM SERPL-SCNC: 3.7 MMOL/L (ref 3.4–5.3)
POTASSIUM SERPL-SCNC: 4.1 MMOL/L (ref 3.4–5.3)
POTASSIUM SERPL-SCNC: 4.2 MMOL/L (ref 3.4–5.3)
PROT SERPL-MCNC: 5.2 G/DL (ref 6.8–8.8)
PROT SERPL-MCNC: 5.6 G/DL (ref 6.8–8.8)
PROT SERPL-MCNC: 5.6 G/DL (ref 6.8–8.8)
RBC # BLD AUTO: 2.42 10E12/L (ref 3.8–5.2)
RBC # BLD AUTO: 2.74 10E12/L (ref 3.8–5.2)
RBC # BLD AUTO: 2.98 10E12/L (ref 3.8–5.2)
RBC # BLD AUTO: 3.08 10E12/L (ref 3.8–5.2)
RBC #/AREA URNS AUTO: 6 /HPF (ref 0–2)
SODIUM SERPL-SCNC: 136 MMOL/L (ref 133–144)
SODIUM SERPL-SCNC: 137 MMOL/L (ref 133–144)
SODIUM SERPL-SCNC: 141 MMOL/L (ref 133–144)
SODIUM SERPL-SCNC: 146 MMOL/L (ref 133–144)
SODIUM UR-SCNC: 5 MMOL/L
SOURCE: ABNORMAL
SP GR UR STRIP: 1.01 (ref 1–1.03)
SPECIMEN EXP DATE BLD: NORMAL
SPECIMEN SOURCE: NORMAL
SPECIMEN SOURCE: NORMAL
SQUAMOUS #/AREA URNS AUTO: 1 /HPF (ref 0–1)
TRANSFUSION STATUS PATIENT QL: NORMAL
TRANSFUSION STATUS PATIENT QL: NORMAL
TRIGL SERPL-MCNC: 96 MG/DL
UROBILINOGEN UR STRIP-MCNC: NORMAL MG/DL (ref 0–2)
WBC # BLD AUTO: 15.7 10E9/L (ref 4–11)
WBC # BLD AUTO: 19.7 10E9/L (ref 4–11)
WBC # BLD AUTO: 20.9 10E9/L (ref 4–11)
WBC # BLD AUTO: 22.9 10E9/L (ref 4–11)
WBC #/AREA URNS AUTO: 6 /HPF (ref 0–5)

## 2019-01-12 PROCEDURE — 36620 INSERTION CATHETER ARTERY: CPT

## 2019-01-12 PROCEDURE — 0W9B3ZZ DRAINAGE OF LEFT PLEURAL CAVITY, PERCUTANEOUS APPROACH: ICD-10-PCS | Performed by: RADIOLOGY

## 2019-01-12 PROCEDURE — 83605 ASSAY OF LACTIC ACID: CPT | Performed by: SURGERY

## 2019-01-12 PROCEDURE — 85027 COMPLETE CBC AUTOMATED: CPT | Performed by: NURSE PRACTITIONER

## 2019-01-12 PROCEDURE — 25000128 H RX IP 250 OP 636

## 2019-01-12 PROCEDURE — 87040 BLOOD CULTURE FOR BACTERIA: CPT | Performed by: NURSE PRACTITIONER

## 2019-01-12 PROCEDURE — 20000003 ZZH R&B ICU

## 2019-01-12 PROCEDURE — 83735 ASSAY OF MAGNESIUM: CPT | Performed by: PHYSICIAN ASSISTANT

## 2019-01-12 PROCEDURE — 80053 COMPREHEN METABOLIC PANEL: CPT | Performed by: SURGERY

## 2019-01-12 PROCEDURE — 40000986 XR CHEST PORT 1 VW

## 2019-01-12 PROCEDURE — 86850 RBC ANTIBODY SCREEN: CPT | Performed by: SURGERY

## 2019-01-12 PROCEDURE — 36415 COLL VENOUS BLD VENIPUNCTURE: CPT | Performed by: PHYSICIAN ASSISTANT

## 2019-01-12 PROCEDURE — P9041 ALBUMIN (HUMAN),5%, 50ML: HCPCS

## 2019-01-12 PROCEDURE — 87106 FUNGI IDENTIFICATION YEAST: CPT | Performed by: SURGERY

## 2019-01-12 PROCEDURE — 00000146 ZZHCL STATISTIC GLUCOSE BY METER IP

## 2019-01-12 PROCEDURE — 25000125 ZZHC RX 250: Performed by: PHYSICIAN ASSISTANT

## 2019-01-12 PROCEDURE — 25000132 ZZH RX MED GY IP 250 OP 250 PS 637: Performed by: STUDENT IN AN ORGANIZED HEALTH CARE EDUCATION/TRAINING PROGRAM

## 2019-01-12 PROCEDURE — 86923 COMPATIBILITY TEST ELECTRIC: CPT | Performed by: SURGERY

## 2019-01-12 PROCEDURE — 87205 SMEAR GRAM STAIN: CPT | Performed by: SURGERY

## 2019-01-12 PROCEDURE — 85520 HEPARIN ASSAY: CPT | Performed by: NURSE PRACTITIONER

## 2019-01-12 PROCEDURE — 84100 ASSAY OF PHOSPHORUS: CPT | Performed by: PHYSICIAN ASSISTANT

## 2019-01-12 PROCEDURE — 84100 ASSAY OF PHOSPHORUS: CPT | Performed by: SURGERY

## 2019-01-12 PROCEDURE — 5A1945Z RESPIRATORY VENTILATION, 24-96 CONSECUTIVE HOURS: ICD-10-PCS | Performed by: SURGERY

## 2019-01-12 PROCEDURE — 25000128 H RX IP 250 OP 636: Performed by: NURSE ANESTHETIST, CERTIFIED REGISTERED

## 2019-01-12 PROCEDURE — 84478 ASSAY OF TRIGLYCERIDES: CPT | Performed by: PHYSICIAN ASSISTANT

## 2019-01-12 PROCEDURE — 85027 COMPLETE CBC AUTOMATED: CPT | Performed by: SURGERY

## 2019-01-12 PROCEDURE — 40000671 ZZH STATISTIC ANESTHESIA CASE

## 2019-01-12 PROCEDURE — 94640 AIRWAY INHALATION TREATMENT: CPT

## 2019-01-12 PROCEDURE — 99231 SBSQ HOSP IP/OBS SF/LOW 25: CPT | Performed by: INTERNAL MEDICINE

## 2019-01-12 PROCEDURE — 80048 BASIC METABOLIC PNL TOTAL CA: CPT | Performed by: NURSE PRACTITIONER

## 2019-01-12 PROCEDURE — 36600 WITHDRAWAL OF ARTERIAL BLOOD: CPT

## 2019-01-12 PROCEDURE — 25000128 H RX IP 250 OP 636: Performed by: STUDENT IN AN ORGANIZED HEALTH CARE EDUCATION/TRAINING PROGRAM

## 2019-01-12 PROCEDURE — 25000125 ZZHC RX 250: Performed by: SURGERY

## 2019-01-12 PROCEDURE — 82330 ASSAY OF CALCIUM: CPT | Performed by: SURGERY

## 2019-01-12 PROCEDURE — 36569 INSJ PICC 5 YR+ W/O IMAGING: CPT

## 2019-01-12 PROCEDURE — 36415 COLL VENOUS BLD VENIPUNCTURE: CPT | Performed by: SURGERY

## 2019-01-12 PROCEDURE — 36415 COLL VENOUS BLD VENIPUNCTURE: CPT | Performed by: NURSE PRACTITIONER

## 2019-01-12 PROCEDURE — P9047 ALBUMIN (HUMAN), 25%, 50ML: HCPCS | Performed by: SURGERY

## 2019-01-12 PROCEDURE — 27210222 ZZH KIT SHRLOCK 6FR POWER PICC

## 2019-01-12 PROCEDURE — 82805 BLOOD GASES W/O2 SATURATION: CPT

## 2019-01-12 PROCEDURE — 25000125 ZZHC RX 250: Performed by: NURSE PRACTITIONER

## 2019-01-12 PROCEDURE — 99222 1ST HOSP IP/OBS MODERATE 55: CPT | Performed by: SURGERY

## 2019-01-12 PROCEDURE — 82374 ASSAY BLOOD CARBON DIOXIDE: CPT | Performed by: PHYSICIAN ASSISTANT

## 2019-01-12 PROCEDURE — 80076 HEPATIC FUNCTION PANEL: CPT | Performed by: NURSE PRACTITIONER

## 2019-01-12 PROCEDURE — 40000275 ZZH STATISTIC RCP TIME EA 10 MIN

## 2019-01-12 PROCEDURE — 81001 URINALYSIS AUTO W/SCOPE: CPT | Performed by: NURSE PRACTITIONER

## 2019-01-12 PROCEDURE — 83735 ASSAY OF MAGNESIUM: CPT | Performed by: SURGERY

## 2019-01-12 PROCEDURE — C9113 INJ PANTOPRAZOLE SODIUM, VIA: HCPCS | Performed by: SURGERY

## 2019-01-12 PROCEDURE — 86900 BLOOD TYPING SEROLOGIC ABO: CPT | Performed by: SURGERY

## 2019-01-12 PROCEDURE — 87070 CULTURE OTHR SPECIMN AEROBIC: CPT | Performed by: SURGERY

## 2019-01-12 PROCEDURE — 94660 CPAP INITIATION&MGMT: CPT

## 2019-01-12 PROCEDURE — 25000128 H RX IP 250 OP 636: Performed by: SURGERY

## 2019-01-12 PROCEDURE — 83880 ASSAY OF NATRIURETIC PEPTIDE: CPT | Performed by: NURSE PRACTITIONER

## 2019-01-12 PROCEDURE — 25000128 H RX IP 250 OP 636: Performed by: INTERNAL MEDICINE

## 2019-01-12 PROCEDURE — 86901 BLOOD TYPING SEROLOGIC RH(D): CPT | Performed by: SURGERY

## 2019-01-12 PROCEDURE — 82330 ASSAY OF CALCIUM: CPT | Performed by: NURSE PRACTITIONER

## 2019-01-12 PROCEDURE — 25000128 H RX IP 250 OP 636: Performed by: THORACIC SURGERY (CARDIOTHORACIC VASCULAR SURGERY)

## 2019-01-12 PROCEDURE — 82805 BLOOD GASES W/O2 SATURATION: CPT | Performed by: SURGERY

## 2019-01-12 PROCEDURE — 82570 ASSAY OF URINE CREATININE: CPT | Performed by: SURGERY

## 2019-01-12 PROCEDURE — 31500 INSERT EMERGENCY AIRWAY: CPT

## 2019-01-12 PROCEDURE — 83605 ASSAY OF LACTIC ACID: CPT | Performed by: NURSE PRACTITIONER

## 2019-01-12 PROCEDURE — P9016 RBC LEUKOCYTES REDUCED: HCPCS | Performed by: SURGERY

## 2019-01-12 PROCEDURE — 84300 ASSAY OF URINE SODIUM: CPT | Performed by: SURGERY

## 2019-01-12 PROCEDURE — 31500 INSERT EMERGENCY AIRWAY: CPT | Performed by: NURSE ANESTHETIST, CERTIFIED REGISTERED

## 2019-01-12 PROCEDURE — 99291 CRITICAL CARE FIRST HOUR: CPT | Mod: 25 | Performed by: NURSE PRACTITIONER

## 2019-01-12 PROCEDURE — 94002 VENT MGMT INPAT INIT DAY: CPT

## 2019-01-12 PROCEDURE — 82805 BLOOD GASES W/O2 SATURATION: CPT | Performed by: NURSE PRACTITIONER

## 2019-01-12 PROCEDURE — 94640 AIRWAY INHALATION TREATMENT: CPT | Mod: 76

## 2019-01-12 PROCEDURE — 74018 RADEX ABDOMEN 1 VIEW: CPT

## 2019-01-12 RX ORDER — PROPOFOL 10 MG/ML
5-75 INJECTION, EMULSION INTRAVENOUS CONTINUOUS
Status: DISCONTINUED | OUTPATIENT
Start: 2019-01-12 | End: 2019-01-13

## 2019-01-12 RX ORDER — HEPARIN SODIUM,PORCINE 10 UNIT/ML
2-5 VIAL (ML) INTRAVENOUS
Status: DISCONTINUED | OUTPATIENT
Start: 2019-01-12 | End: 2019-01-29 | Stop reason: HOSPADM

## 2019-01-12 RX ORDER — METOPROLOL TARTRATE 1 MG/ML
5 INJECTION, SOLUTION INTRAVENOUS EVERY 6 HOURS
Status: DISCONTINUED | OUTPATIENT
Start: 2019-01-12 | End: 2019-01-13

## 2019-01-12 RX ORDER — MEROPENEM 500 MG/1
500 INJECTION, POWDER, FOR SOLUTION INTRAVENOUS EVERY 12 HOURS
Status: DISCONTINUED | OUTPATIENT
Start: 2019-01-12 | End: 2019-01-13 | Stop reason: DRUGHIGH

## 2019-01-12 RX ORDER — NOREPINEPHRINE BITARTRATE/D5W 16MG/250ML
0.03-0.4 PLASTIC BAG, INJECTION (ML) INTRAVENOUS CONTINUOUS
Status: DISCONTINUED | OUTPATIENT
Start: 2019-01-12 | End: 2019-01-21

## 2019-01-12 RX ORDER — ALBUTEROL SULFATE 0.83 MG/ML
2.5 SOLUTION RESPIRATORY (INHALATION)
Status: DISCONTINUED | OUTPATIENT
Start: 2019-01-12 | End: 2019-01-12

## 2019-01-12 RX ORDER — FUROSEMIDE 10 MG/ML
10 INJECTION INTRAMUSCULAR; INTRAVENOUS ONCE
Status: COMPLETED | OUTPATIENT
Start: 2019-01-12 | End: 2019-01-12

## 2019-01-12 RX ORDER — LIDOCAINE 40 MG/G
CREAM TOPICAL
Status: DISCONTINUED | OUTPATIENT
Start: 2019-01-12 | End: 2019-01-29 | Stop reason: HOSPADM

## 2019-01-12 RX ORDER — ALBUMIN, HUMAN INJ 5% 5 %
SOLUTION INTRAVENOUS
Status: COMPLETED
Start: 2019-01-12 | End: 2019-01-12

## 2019-01-12 RX ORDER — SODIUM CHLORIDE 450 MG/100ML
INJECTION, SOLUTION INTRAVENOUS CONTINUOUS
Status: DISCONTINUED | OUTPATIENT
Start: 2019-01-12 | End: 2019-01-12

## 2019-01-12 RX ORDER — PROPOFOL 10 MG/ML
INJECTION, EMULSION INTRAVENOUS
Status: COMPLETED
Start: 2019-01-12 | End: 2019-01-12

## 2019-01-12 RX ORDER — ALBUMIN, HUMAN INJ 5% 5 %
12.5 SOLUTION INTRAVENOUS ONCE
Status: COMPLETED | OUTPATIENT
Start: 2019-01-12 | End: 2019-01-12

## 2019-01-12 RX ORDER — ALBUTEROL SULFATE 0.83 MG/ML
2.5 SOLUTION RESPIRATORY (INHALATION) EVERY 4 HOURS PRN
Status: DISCONTINUED | OUTPATIENT
Start: 2019-01-12 | End: 2019-01-29 | Stop reason: HOSPADM

## 2019-01-12 RX ORDER — ALBUMIN (HUMAN) 12.5 G/50ML
25 SOLUTION INTRAVENOUS ONCE
Status: COMPLETED | OUTPATIENT
Start: 2019-01-12 | End: 2019-01-12

## 2019-01-12 RX ORDER — MAGNESIUM SULFATE HEPTAHYDRATE 40 MG/ML
2 INJECTION, SOLUTION INTRAVENOUS ONCE
Status: COMPLETED | OUTPATIENT
Start: 2019-01-12 | End: 2019-01-12

## 2019-01-12 RX ORDER — PROPOFOL 10 MG/ML
INJECTION, EMULSION INTRAVENOUS PRN
Status: DISCONTINUED | OUTPATIENT
Start: 2019-01-12 | End: 2019-01-12

## 2019-01-12 RX ADMIN — SODIUM BICARBONATE 20 ML/HR: 84 INJECTION, SOLUTION INTRAVENOUS at 22:21

## 2019-01-12 RX ADMIN — PROPOFOL 15 MCG/KG/MIN: 10 INJECTION, EMULSION INTRAVENOUS at 20:35

## 2019-01-12 RX ADMIN — CEFEPIME HYDROCHLORIDE 2 G: 2 INJECTION, POWDER, FOR SOLUTION INTRAVENOUS at 20:02

## 2019-01-12 RX ADMIN — FENTANYL CITRATE 50 MCG: 50 INJECTION, SOLUTION INTRAMUSCULAR; INTRAVENOUS at 20:54

## 2019-01-12 RX ADMIN — FENTANYL CITRATE 25 MCG: 50 INJECTION, SOLUTION INTRAMUSCULAR; INTRAVENOUS at 19:48

## 2019-01-12 RX ADMIN — OXYCODONE HYDROCHLORIDE 5 MG: 5 TABLET ORAL at 00:14

## 2019-01-12 RX ADMIN — PHENYLEPHRINE HYDROCHLORIDE 200 MCG: 10 INJECTION, SOLUTION INTRAMUSCULAR; INTRAVENOUS; SUBCUTANEOUS at 20:29

## 2019-01-12 RX ADMIN — LEVALBUTEROL HYDROCHLORIDE 1.25 MG: 1.25 SOLUTION, CONCENTRATE RESPIRATORY (INHALATION) at 03:10

## 2019-01-12 RX ADMIN — SODIUM BICARBONATE 30 ML/HR: 84 INJECTION, SOLUTION INTRAVENOUS at 17:20

## 2019-01-12 RX ADMIN — FUROSEMIDE 10 MG: 10 INJECTION, SOLUTION INTRAVENOUS at 04:52

## 2019-01-12 RX ADMIN — ALBUMIN HUMAN 25 G: 0.25 SOLUTION INTRAVENOUS at 02:35

## 2019-01-12 RX ADMIN — ALBUMIN HUMAN 12.5 G: 50 SOLUTION INTRAVENOUS at 17:56

## 2019-01-12 RX ADMIN — PHENYLEPHRINE HYDROCHLORIDE 100 MCG: 10 INJECTION, SOLUTION INTRAMUSCULAR; INTRAVENOUS; SUBCUTANEOUS at 20:31

## 2019-01-12 RX ADMIN — NOREPINEPHRINE BITARTRATE 0.03 MCG/KG/MIN: 1 INJECTION INTRAVENOUS at 21:44

## 2019-01-12 RX ADMIN — SODIUM BICARBONATE 30 ML/HR: 84 INJECTION, SOLUTION INTRAVENOUS at 13:58

## 2019-01-12 RX ADMIN — MIDAZOLAM 1 MG/HR: 5 INJECTION INTRAMUSCULAR; INTRAVENOUS at 21:06

## 2019-01-12 RX ADMIN — PHENYLEPHRINE HYDROCHLORIDE 100 MCG: 10 INJECTION, SOLUTION INTRAMUSCULAR; INTRAVENOUS; SUBCUTANEOUS at 20:30

## 2019-01-12 RX ADMIN — PANTOPRAZOLE SODIUM 40 MG: 40 INJECTION, POWDER, FOR SOLUTION INTRAVENOUS at 22:52

## 2019-01-12 RX ADMIN — SODIUM BICARBONATE 40 ML/HR: 84 INJECTION, SOLUTION INTRAVENOUS at 19:47

## 2019-01-12 RX ADMIN — VANCOMYCIN HYDROCHLORIDE 2000 MG: 5 INJECTION, POWDER, LYOPHILIZED, FOR SOLUTION INTRAVENOUS at 11:19

## 2019-01-12 RX ADMIN — ALBUMIN HUMAN 12.5 G: 0.05 INJECTION, SOLUTION INTRAVENOUS at 17:56

## 2019-01-12 RX ADMIN — MEROPENEM 500 MG: 500 INJECTION, POWDER, FOR SOLUTION INTRAVENOUS at 21:44

## 2019-01-12 RX ADMIN — LEVALBUTEROL HYDROCHLORIDE 1.25 MG: 1.25 SOLUTION, CONCENTRATE RESPIRATORY (INHALATION) at 11:42

## 2019-01-12 RX ADMIN — FUROSEMIDE 5 MG/HR: 10 INJECTION, SOLUTION INTRAVENOUS at 06:04

## 2019-01-12 RX ADMIN — PHENYLEPHRINE HYDROCHLORIDE 100 MCG: 10 INJECTION, SOLUTION INTRAMUSCULAR; INTRAVENOUS; SUBCUTANEOUS at 20:28

## 2019-01-12 RX ADMIN — Medication 50 MCG/HR: at 21:12

## 2019-01-12 RX ADMIN — LEVALBUTEROL HYDROCHLORIDE 1.25 MG: 1.25 SOLUTION, CONCENTRATE RESPIRATORY (INHALATION) at 08:22

## 2019-01-12 RX ADMIN — METOPROLOL TARTRATE 5 MG: 5 INJECTION, SOLUTION INTRAVENOUS at 22:01

## 2019-01-12 RX ADMIN — PROPOFOL 100 MG: 10 INJECTION, EMULSION INTRAVENOUS at 20:26

## 2019-01-12 RX ADMIN — POTASSIUM CHLORIDE 20 MEQ: 400 INJECTION, SOLUTION INTRAVENOUS at 23:14

## 2019-01-12 RX ADMIN — MAGNESIUM SULFATE IN WATER 2 G: 40 INJECTION, SOLUTION INTRAVENOUS at 04:57

## 2019-01-12 RX ADMIN — METOPROLOL TARTRATE 5 MG: 5 INJECTION, SOLUTION INTRAVENOUS at 11:20

## 2019-01-12 RX ADMIN — LIDOCAINE HYDROCHLORIDE 2 ML: 10 INJECTION, SOLUTION EPIDURAL; INFILTRATION; INTRACAUDAL; PERINEURAL at 12:56

## 2019-01-12 RX ADMIN — CEFEPIME HYDROCHLORIDE 2 G: 2 INJECTION, POWDER, FOR SOLUTION INTRAVENOUS at 09:57

## 2019-01-12 ASSESSMENT — ACTIVITIES OF DAILY LIVING (ADL)
ADLS_ACUITY_SCORE: 13

## 2019-01-12 ASSESSMENT — MIFFLIN-ST. JEOR: SCORE: 1398

## 2019-01-12 NOTE — PROVIDER NOTIFICATION
No UOP since midnight. Hgb drop from 8.2 to 7, no signs obvious bleeding, no change in L groin site. Back pain resolved with oxycodone, pt reports occasional back pain at home. Orders for albumin and 1 unit PRBC.

## 2019-01-12 NOTE — PROGRESS NOTES
Patient on BiPAP 14/5 50% overnight. Alarm volume set at 10. Mepilex applied. BBS coarse, diminished. Neb treatments given as ordered. RT will continue to monitor.

## 2019-01-12 NOTE — PROGRESS NOTES
"EP- Cardiology Progress Note           Assessment and Plan:     78-yo F with Hx of HTN, who p/w ascending Ao dissection (s/p repair January 5) and postprocedure was complicated by acute renal failure and Afib with RVR.  She underwent pacemaker implantation and AV node ablation yesterday.    Surgical wound is well approximated.  No hematoma noticed. Device interrogation showed good lead parameters.  Chest Xray is pending this morning.       PLAN:  1. Device care.  Follow up in device clinic in 7-10 days.  2. Afib.  Heart rate is well controlled after AV node ablation.   3. Embolic prevention.  Chads vascular score of 3.      Start oral anticoagulation once safe per surgical team.  Avoid heparin for at least 48 hours after device implantation.     Will sign off.  Please call with questions.    Physical Exam:  Vitals: /86   Pulse 80   Temp 96.8  F (36  C) (Axillary)   Resp (!) 34   Ht 1.626 m (5' 4\")   Wt 92.3 kg (203 lb 7.8 oz)   SpO2 91%   BMI 34.93 kg/m        Intake/Output Summary (Last 24 hours) at 1/12/2019 0858  Last data filed at 1/12/2019 0800  Gross per 24 hour   Intake 1127.04 ml   Output 835 ml   Net 292.04 ml     Vitals:    01/06/19 0600 01/07/19 0600 01/08/19 0000 01/09/19 0600   Weight: 84.5 kg (186 lb 4.6 oz) 86 kg (189 lb 9.5 oz) 86.9 kg (191 lb 9.3 oz) 88.7 kg (195 lb 8.8 oz)    01/12/19 0600   Weight: 92.3 kg (203 lb 7.8 oz)       Constitutional:  AAO x3.  Pt is in NAD.  HEAD: Normocephalic.  SKIN: Skin normal color, texture and turgor with no lesions or eruptions.  Eyes: PERRL, EOMI.  ENT:  Supple, normal JVP. No lymphadenopathy or thyroid enlargement.  Chest:  Decrease breath sound in base B/L.   Cardiac:  RRR,normal S1 and S2.   No murmurs rubs or gallop.  Abdomen:  Normal BS.  Soft, non-tender and non-distended.  No rebound or guarding.    Extremities:  Pedious pulses palpable B/L.  No LE edema noticed.   Neurological: Strength and sensation grossly symmetric and intact throughout.   "                          Review of Systems:   As per subjective, otherwise 5 systems reviewed and negative.         Medications:          albumin human         aspirin  81 mg Oral Daily     ceFEPIme (MAXIPIME) IV  2 g Intravenous Q12H     influenza Vac Split High-Dose  0.5 mL Intramuscular Prior to discharge     insulin aspart  1-6 Units Subcutaneous Q4H     levalbuterol  1.25 mg Nebulization Q4H     lidocaine (PF)  10 mL Subcutaneous Once     metoprolol succinate ER  25 mg Oral BID     pantoprazole  40 mg Oral QAM AC     sodium chloride (PF)  3 mL Intracatheter Q8H     sodium chloride (PF)  3 mL Intracatheter Q8H     sodium chloride (PF)  3 mL Intracatheter Q8H     PRN Meds: acetaminophen, IV fluid REPLACEMENT ONLY, glucose **OR** dextrose **OR** glucagon, fentaNYL, hydrALAZINE, lidocaine 4%, lidocaine 4%, lidocaine 4%, lidocaine (buffered or not buffered), lidocaine (buffered or not buffered), magnesium sulfate, magnesium sulfate, meperidine, methocarbamol, naloxone, ondansetron **OR** ondansetron, oxyCODONE IR, oxyCODONE-acetaminophen, potassium chloride, potassium chloride with lidocaine, potassium chloride, potassium chloride, potassium chloride, sodium chloride (PF), sodium chloride (PF), sodium chloride (PF), sodium phosphate, sodium phosphate, sodium phosphate, sodium phosphate             Data:     Recent Labs   Lab 01/12/19  0745 01/12/19  0115 01/11/19  1046 01/11/19  0903  01/07/19  1422 01/07/19  0430  01/06/19  0435   WBC 20.9* 15.7*  --  15.6*   < >  --  8.3  --  10.3   HGB 9.1* 7.0*  --  8.2*   < >  --  8.3*  --  9.1*   MCV 85 85  --  85   < >  --  85  --  83    171  --  186   < >  --  89*  --  74*   INR  --   --  1.28*  --   --   --   --   --  1.46*    136  --  137   < > 143 143   < > 143   POTASSIUM 4.2 4.1 4.3 4.3   < > 3.7 3.5   < > 3.5   CHLORIDE 108 108  --  108   < > 115* 112*   < > 111*   CO2 16* 14*  --  17*   < > 20 21   < > 21   BUN 62* 59*  --  53*   < > 54* 55*   < > 39*    CR 1.67* 1.54*  --  1.44*   < > 1.71* 1.99*   < > 1.72*   ANIONGAP 13 14  --  12   < > 8 10   < > 11   BESS 8.1* 7.9*  --  8.1*   < > 7.4* 7.3*   < > 7.3*   * 103*  --  89   < > 119* 106*   < > 129*   ALBUMIN  --  2.9*  --   --   --  2.8* 3.0*   < >  --    PROTTOTAL  --  5.6*  --   --   --   --  5.4*  --   --    BILITOTAL  --  1.6*  --   --   --   --  1.6*  --   --    ALKPHOS  --  76  --   --   --   --  53  --   --    ALT  --  28  --   --   --   --  19  --   --    AST  --  53*  --   --   --   --  51*  --   --     < > = values in this interval not displayed.

## 2019-01-12 NOTE — CONSULTS
General Surgery Consultation     Priya Funez MRN# [id number]   YOB: 1940 Age: 78 year old   Date of Admission: 1/4/2019     Reason for consult: I was asked by STACIE June to evaluate this patient for small bowel ischemia.           Assessment and Plan:   Active Problems:    Ascending aortic dissection (H), abdominal distention    Assessment: ileus, recovering intestinal ischemia    Plan: observation of clinical progression and laboratory results trends                  Chief Complaint:   Abdominal distention, laboratories abnormalities    History is obtained from the electronic health record         History of Present Illness:   This patient is a 78 year old female who presents with aortic dissection, now repaired.  There was at least temporary SMA occlusion, but good flow afterward.  There has been no nausea/vomit, no blood in stools or stigmata the ischemia.  Her abdominal xray is non specific.  There is a possible pneumonia.  Stools have been liquid and brown.             Past Medical History:     Past Medical History:   Diagnosis Date     Hypertension              Past Surgical History:     Past Surgical History:   Procedure Laterality Date     ANGIOGRAM Right 1/4/2019    Procedure: DIAGONSTIC ANGIOGRAM OF SMA;  Surgeon: Rigo Jennings MD;  Location:  OR     BYPASS GRAFT ARTERY CORONARY, REPAIR VALVE AORTIC, COMBINED N/A 1/4/2019    Procedure: AORTIC DISSECTION REPAIR WITH GRAFT- HEMASHIELD PLATINUM WOVEN DOUBLE VELOR 4 SIDE ARM VASCULAR GRAFT, D:35 X 12 X 10 X 10MM/ L:50CM;  Surgeon: Jose Winslow MD;  Location:  OR                Social History:   Reviewed and non-contributory          Family History:   I have reviewed this patient's family history          Immunizations:   There is no immunization history for the selected administration types on file for this patient.          Allergies:     Allergies   Allergen Reactions     Amoxicillin Swelling     Ciprofloxacin  Swelling             Medications:     Current Facility-Administered Medications Ordered in Epic   Medication Dose Route Frequency Last Rate Last Dose     acetaminophen (TYLENOL) tablet 650 mg  650 mg Oral Q4H PRN   650 mg at 01/10/19 2032     albuterol (PROVENTIL) neb solution 2.5 mg  2.5 mg Nebulization Q4H PRN         aspirin EC tablet 81 mg  81 mg Oral Daily   81 mg at 01/11/19 2140     ceFEPIme (MAXIPIME) 2 g vial to attach to  ml bag for ADULTS or 50 ml bag for PEDS  2 g Intravenous Q12H   2 g at 01/12/19 0957     dextrose 10 % 1,000 mL infusion   Intravenous Continuous PRN         dextrose 10 % 1,000 mL infusion   Intravenous Continuous PRN         glucose gel 15-30 g  15-30 g Oral Q15 Min PRN        Or     dextrose 50 % injection 25-50 mL  25-50 mL Intravenous Q15 Min PRN        Or     glucagon injection 1 mg  1 mg Subcutaneous Q15 Min PRN         EPINEPHrine (ADRENALIN) 5 mg in sodium chloride 0.9 % 250 mL infusion  0.01-0.1 mcg/kg/min Intravenous Continuous   Stopped at 01/05/19 0747     fentaNYL (PF) (SUBLIMAZE) injection  mcg   mcg Intravenous Q1H PRN   25 mcg at 01/06/19 1602     furosemide (LASIX) 100 mg in sodium chloride 0.9 % 100 mL infusion  5 mg/hr Intravenous Continuous   Stopped at 01/12/19 0945     heparin lock flush 10 UNIT/ML injection 2-5 mL  2-5 mL Intracatheter Once PRN         hydrALAZINE (APRESOLINE) injection 10 mg  10 mg Intravenous Q30 Min PRN   10 mg at 01/06/19 1632     influenza Vac Split High-Dose (FLUZONE) injection 0.5 mL  0.5 mL Intramuscular Prior to discharge         insulin aspart (NovoLOG) inj (RAPID ACTING)  1-6 Units Subcutaneous Q4H         lidocaine (LMX4) cream   Topical Once PRN         lidocaine (LMX4) cream   Topical Q1H PRN         lidocaine (LMX4) cream   Topical Q1H PRN         lidocaine (LMX4) cream   Topical Q1H PRN         lidocaine (PF) (XYLOCAINE) 1 % injection 10 mL  10 mL Subcutaneous Once         lidocaine 1 % 0.5-5 mL  0.5-5 mL Other  Once PRN         lidocaine 1 % 1 mL  1 mL Other Q1H PRN         lidocaine 1 % 1 mL  1 mL Other Q1H PRN         magnesium sulfate 2 g in water intermittent infusion  2 g Intravenous Daily PRN         magnesium sulfate 4 g in 100 mL sterile water (premade)  4 g Intravenous Q4H PRN         meperidine (DEMEROL) injection 12.5-25 mg  12.5-25 mg Intravenous Q15 Min PRN         methocarbamol (ROBAXIN) tablet 500 mg  500 mg Oral 4x Daily PRN         metoprolol (LOPRESSOR) injection 5 mg  5 mg Intravenous Q6H   5 mg at 01/12/19 1120     naloxone (NARCAN) injection 0.1-0.4 mg  0.1-0.4 mg Intravenous Q2 Min PRN         nitroGLYcerin 50 mg in D5W 250 mL (adult std) infusion  0.07-2 mcg/kg/min Intravenous Continuous   Stopped at 01/06/19 2230     ondansetron (ZOFRAN-ODT) ODT tab 4 mg  4 mg Oral Q6H PRN        Or     ondansetron (ZOFRAN) injection 4 mg  4 mg Intravenous Q6H PRN   4 mg at 01/06/19 0201     oxyCODONE (ROXICODONE) tablet 5-10 mg  5-10 mg Oral Q4H PRN   5 mg at 01/12/19 0014     oxyCODONE-acetaminophen (PERCOCET) 5-325 MG per tablet 1 tablet  1 tablet Oral Q4H PRN         pantoprazole (PROTONIX) EC tablet 40 mg  40 mg Oral QAM AC   40 mg at 01/11/19 0900     phenylephrine (PHOEBE-SYNEPHRINE) 50 mg in sodium chloride 0.9 % 250 mL infusion  0.5-2 mcg/kg/min Intravenous Continuous   Stopped at 01/07/19 0648     potassium chloride (KLOR-CON) Packet 20-40 mEq  20-40 mEq Oral or Feeding Tube Q2H PRN         potassium chloride 10 mEq in 100 mL intermittent infusion with 10 mg lidocaine  10 mEq Intravenous Q1H PRN         potassium chloride 10 mEq in 100 mL sterile water intermittent infusion (premix)  10 mEq Intravenous Q1H PRN         potassium chloride 20 mEq in 50 mL intermittent infusion  20 mEq Intravenous Q1H PRN   20 mEq at 01/08/19 0636     potassium chloride ER (K-DUR/KLOR-CON M) CR tablet 20-40 mEq  20-40 mEq Oral Q2H PRN   20 mEq at 01/10/19 0702     sodium bicarbonate (BICARB) 1 mEq/mL drip  30 mL/hr Intravenous  Continuous 30 mL/hr at 01/12/19 1358 30 mL/hr at 01/12/19 1358     sodium chloride (PF) 0.9% PF flush 10 mL  10 mL Intracatheter Q8H         sodium chloride (PF) 0.9% PF flush 10-20 mL  10-20 mL Intracatheter Q1H PRN         sodium chloride (PF) 0.9% PF flush 3 mL  3 mL Intracatheter Q1H PRN         sodium chloride (PF) 0.9% PF flush 3 mL  3 mL Intracatheter Q8H         sodium chloride (PF) 0.9% PF flush 3 mL  3 mL Intracatheter Q1H PRN         sodium chloride (PF) 0.9% PF flush 3 mL  3 mL Intracatheter Q8H         sodium chloride (PF) 0.9% PF flush 3 mL  3 mL Intracatheter Q1H PRN         sodium chloride (PF) 0.9% PF flush 3 mL  3 mL Intracatheter Q8H   3 mL at 01/12/19 1309     sodium chloride (PF) 0.9% PF flush 5-50 mL  5-50 mL Intracatheter Once PRN         sodium chloride 0.9% infusion   Intravenous Continuous   Stopped at 01/08/19 1200     sodium phosphate 10 mmol in D5W intermittent infusion  10 mmol Intravenous Daily PRN         sodium phosphate 15 mmol in D5W intermittent infusion  15 mmol Intravenous Daily PRN   15 mmol at 01/10/19 1030     sodium phosphate 20 mmol in D5W intermittent infusion  20 mmol Intravenous Q6H PRN         sodium phosphate 25 mmol in D5W intermittent infusion  25 mmol Intravenous Q8H PRN         vancomycin place frausto - receiving intermittent dosing  1 each Does not apply See Admin Instructions         No current Epic-ordered outpatient medications on file.             Review of Systems:   The 5 point Review of Systems is negative other than noted in the HPI           Physical Exam:   Vitals were reviewed  Constitutional:   awake, alert, cooperative, mild distress, appears stated age and moderately obese     Eyes:   sclera clear     Neck:   supple, symmetrical, trachea midline     Lungs:   Mild respiratory distress, good air exchange and no retractions     Abdomen:   scars noted epigastric and umbilical, soft, distended, non-tender, no masses palpated, hernia absent and ascites  absent     Musculoskeletal:   tone is normal     Skin:   normal skin color, texture, turgor             Data:   All laboratory and imaging data in the past 24 hours reviewed

## 2019-01-12 NOTE — PROVIDER NOTIFICATION
Continued low UOP. Intensivist aware. Up 7 L since admit, BLE edema, lung bases diminished. Labs ordered.

## 2019-01-12 NOTE — PROGRESS NOTES
[facility]  Critical Care Service  Progress Note  Date of Service (when I saw the patient): 01/12/2019  Main Plans for Today  Follow lactic acid  Monitor for signs of worsening resp status  NPO / Start TPN  Assessment & Plan  Priya Funez is a 78 year old female with PMH of HTN admitted on 1/4/2019 with sudden onset of back pain found to have type A aortic dissection and sent for emergency aortic dissection repair with graft.  She was admitted to ICU for ongoing care.     1/5: did well overnight.  Epi weaned off; requiring low dose phenylephrine.  Hgb stable. Extubated in the afternoon.   1/6:poor inspiratory effort and weak cough through the day.  Placed on Bipap overnight.  NTG gtt for improved BP control.  Rigors and fever to 103  1/7: Fever resolved; stable respiratory status on HFNC.  SvO2 48; getting echocardiogram.  Normal cardiac output; EF 55-60%   1/8: Rapid A Fib  1/9: Titrating down FiO2; improved ABG and VBG   1/11-12, hypoxic requiring bipap with little reserve, low UO, fluid resuscitated  with albumin blood and lasix     Neuro  1. Encephalopathy; multifactorial; resolved.   2. ICU delirium  3. Acute pain  Plan:  -- PRN acetaminophen, fentanyl, robaxin and oxycodone -- minimize narcotics as able  -- Maintain circadian rhythm.  Lights on during the day.  Off at night, minimize cares at night.  OOB during the day.      CV  1. S/p Type A aortic dissection repair 1/4 with Davis Little, and Michaela  2. HTN  3. Low SvO2/ venous oxyhemoglobin of 46  4. Afib with RVR, uncontrolled with medications now s/p AV node ablation and pacemaker placement 1/11/19  Plan:  --CV Surgery and Vascular surgery primary   --Monitor hemodynamics.  Maintain SBP<130  --Cardiac meds per CV surgery, metoprolol if tolerated  --Echocardiogram 1/7; EF 50-55%; IVC not dilated, ventricles small      Resp:  1. Post operative ventilator management; resolved   2. Acute hypoxemic /hypercapnic respiratory failure.     Plan:  -- Initially tolerated HFO2 but now requiring bipap.  O2 needs reasonable at 50%, MV concerning at 15-16L/min  -- BiPAP at HS if desaturating or PRN for rest periods.   -- Discontinue levo-albuterol scheduled, add albuterol PRN  -- Aggressive pulmonary hygiene as able, up to chair      GI/Nutrition  1. Ileus  2. Malnutrition, nia/pro deficits  Plan:  -- Liquid stool slowing, abdomin distended, abd flat plate shows edematous bowel pattern.  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  -- General surgery consult to follow with us.  -- Strict NPO for now, start TPN     Renal  1. IMAN; abdominal aortic dissection involving right renal artery.  Atrophic right kidney with decreased flow. No prior hx.  Baseline creatinine appears to be 0.8;   2. Hypophosphatemia   3. Non-lactate metabolic acidosis  Plan:  --Nephrology consulted, now signed off. Renogram 1/7.  Renal US 1/6 with flow present in both R/L renal arteries. Right kidney atrophic.    -- Monitor output closely, blood transfusion seemed to contribute to immediate respiratory deterioration last evening, blood/albumin given, lasix bolus and gtt started (gtt now off)  - Unclear picture of volume status   -- Metabolic acidosis concerning for brewing infection or ischemia.  Monitor lactate closely.      ID  1. Fevers (improved)  2. Leukocytosis (worsening)  Plan:  --andriy-op Cefepime and Vancomycin. Vancomycin complete. -- Cefepime has been continued in setting of leukocytosis.  Day 5 today; plan to complete 7 day course, may require being extended depending on status   -- Add empiric vanco today   -- Blood cultures with no growth, redraw cultures today.        Endocrine  1. Stress Hyperglycemia; resolved   Plan:  --  BG check Q 4 hours. Medium resistance sliding scale insulin. Has not been requiring       Heme:  1. Acute blood loss anemia  2. Thrombocytopenia; improving, likely consumptive  Plan:  -- Monitor hemoglobin. Transfuse to keep >  "7.0  -- Per cardiology hold any subcutaneous heparin for a minimum of 48 hours and typically 2 weeks, risk/benefit will need to be decided by CV surgery     MSK/Skin  1. Sternotomy   Plan:  -- Sternal precautions  -- Wound care per protocol      General cares:  DVT Prophylaxis: continue PCD's hold heparin per cardiology.   GI Prophylaxis: PPI  Restraints: Restraints for medical healing needed: NO  Family update by me today: Yes   Current lines are required for patient management  Access: JEANNE Cai  Time Spent on this Encounter   Billing:  I spent 60 minutes bedside and on the inpatient unit today managing the critical care of Priya Funez in relation to the issues listed in this note.  Interval History   Has become more dependent on bipap overnight.  Serum CO2, dropping, pt wakes and speaks but some general delirium noted or [place]?  Difficult to assess.  Denies pain, c/p breathing rougher than usual but not \"short\".  Coello, rectal tube, abdomen \"feels hard\", thirsty.     Physical Exam   Temp: 97  F (36.1  C) Temp src: Axillary Temp  Min: 96.8  F (36  C)  Max: 99  F (37.2  C) BP: 130/72 Pulse: 80 Heart Rate: 80 Resp: (!) 31 SpO2: 91 % O2 Device: BiPAP/CPAP(14/5) Oxygen Delivery: Other (Comments)(30 LPM)  Vitals:    01/08/19 0000 01/09/19 0600 01/12/19 0600   Weight: 86.9 kg (191 lb 9.3 oz) 88.7 kg (195 lb 8.8 oz) 92.3 kg (203 lb 7.8 oz)     I/O last 3 completed shifts:  In: 927.04 [P.O.:120; I.V.:507.04]  Out: 785 [Urine:735; Stool:50]    GEN: AA  EYES: PERRL, Anicteric sclera.   HEENT:  Normocephalic, atraumatic, trachea midline, bipap mask with protection dressing on nose  CV: RRR, no gallops, rubs, or murmurs  PULM/CHEST: Coarse breath sounds bilaterally without rhonchi, crackles or wheeze, symmetric chest rise  GI: distended, tympanic, no pain to palpation,   : coello catheter in place, urine yellow and clear  EXTREMITIES: ++ peripheral edema, moving all extremities, peripheral pulses " intact  NEURO: Cranial nerves II-XII grossly intact, no motor deficits noted  SKIN: warm and dry  PSYCH:  Affect: calm, mentation at baseline?  Imaging personally reviewed:  Bilateral effusions.  Abd flat plat with diffuse pattern and edematous bowel  ECG:  Fully paced    Data   Recent Labs   Lab 01/12/19  0745 01/12/19  0115 01/11/19  1046 01/11/19  0903  01/07/19  1422 01/07/19  0430  01/06/19  0435   WBC 20.9* 15.7*  --  15.6*   < >  --  8.3  --  10.3   HGB 9.1* 7.0*  --  8.2*   < >  --  8.3*  --  9.1*   MCV 85 85  --  85   < >  --  85  --  83    171  --  186   < >  --  89*  --  74*   INR  --   --  1.28*  --   --   --   --   --  1.46*    136  --  137   < > 143 143   < > 143   POTASSIUM 4.2 4.1 4.3 4.3   < > 3.7 3.5   < > 3.5   CHLORIDE 108 108  --  108   < > 115* 112*   < > 111*   CO2 16* 14*  --  17*   < > 20 21   < > 21   BUN 62* 59*  --  53*   < > 54* 55*   < > 39*   CR 1.67* 1.54*  --  1.44*   < > 1.71* 1.99*   < > 1.72*   ANIONGAP 13 14  --  12   < > 8 10   < > 11   BESS 8.1* 7.9*  --  8.1*   < > 7.4* 7.3*   < > 7.3*   * 103*  --  89   < > 119* 106*   < > 129*   ALBUMIN  --  2.9*  --   --   --  2.8* 3.0*   < >  --    PROTTOTAL  --  5.6*  --   --   --   --  5.4*  --   --    BILITOTAL  --  1.6*  --   --   --   --  1.6*  --   --    ALKPHOS  --  76  --   --   --   --  53  --   --    ALT  --  28  --   --   --   --  19  --   --    AST  --  53*  --   --   --   --  51*  --   --     < > = values in this interval not displayed.

## 2019-01-12 NOTE — PROVIDER NOTIFICATION
Urine output has steadily been decreasing since stopping the lasix drip.  Now less than 25 ml per hour.  Per Dr. Meléndez give 5% albumin now.

## 2019-01-12 NOTE — PROGRESS NOTES
[facility]  Cardiovascular and Thoracic Surgery Daily Note          Assessment and Plan:   POD#8 s/p Left groin cutdown with exposure of the femoral vessels with arterial cannulation for cardiopulmonary bypass. Right groin cutdown with exposure of the femoral vessels with the right femoral artery ultimately used for diagnostic angiography. 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 Visceral angiography. Repair of the bilateral femoral vessels. By Dr. Jose Winslow  -CVS: HR: 80 prev 120-130s. Underwent PPM on pod#7. SBP: 100s-110s. ASA, BB. Rapid a fib now paced, will need anticoagulation. Transitioned to oral amiodarone. EP following.  -Resp: Extubated within protocol. Saturating well on Bipap. US thoracentesis pod#7 with removal of 150 ml pleural fluid. Continue to encourage IS, cough, deep breathing ambulation.   -Neuro:  Grossly intact. Pain controlled.   -Renal: up 15 kg above pre-op weight. I&Os do not reflect fluid status accurately d/t significant prior stool incontinence.  Cr: 1.67<1.44. 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 Renogram on POD#3-- atrophic right kidney. Nephrology signed off. Anuric overnight, blood/albumin with lasix gtt initiated. Off this am with inc in fluids 75 ml/hr. Continue to closely monitor if no response will add lasix gtt back.  -GI:  Tolerating clears. +BM. C diff on POD#4 for water stools- negative. Continue bowel regimen, AXR bowel appears edematous, made NPO, gen surg consulted, abdomen distended/hard. SMA was occluded prior to surgery  -:  Barrientos to remain in d/t limited mobility and need for accurate I&Os.   -Endo: pre op a1c: 5.3. Completed insulin gtt per protocol. Continue sliding scale.   -FEN: replace electrolytes as needed. Na: 137. K: 4.2  Orders Placed This Encounter      NPO for Medical/Clinical Reasons Except for: No  "Exceptions    -ID: Temp (24hrs), Av  F (36.7  C), Min:96.8  F (36  C), Max:99  F (37.2  C),  WBC: 20.9<15.6<12.1<9.2. Completed perioperative abx. Fevers on POD #4-- continued on cefepime. Will continue to monitor. Cultures pending  -Heme: Hgb: 9.1<8.2. Plt: 199. Acute blood loss and thrombocytopenia related to surgery. Was given blood overnight with lasix gtt to follow.   -Proph: PCD, ASA, BB, statin, PPI, sub q heparin  -Dispo: Continue ICU cares. Appreciate gen surg seeing for edema/ileus to establish a baseline.           Interval History:   Lying in bed, on Bipap, breathing fine, denies pain, abdomen distended, not tender though       Medications:       aspirin  81 mg Oral Daily     ceFEPIme (MAXIPIME) IV  2 g Intravenous Q12H     influenza Vac Split High-Dose  0.5 mL Intramuscular Prior to discharge     insulin aspart  1-6 Units Subcutaneous Q4H     levalbuterol  1.25 mg Nebulization Q4H     lidocaine (PF)  10 mL Subcutaneous Once     metoprolol succinate ER  25 mg Oral BID     pantoprazole  40 mg Oral QAM AC     sodium chloride (PF)  3 mL Intracatheter Q8H     sodium chloride (PF)  3 mL Intracatheter Q8H     sodium chloride (PF)  3 mL Intracatheter Q8H     vancomycin (VANCOCIN) IV  2,000 mg Intravenous Once     vancomycin place frausto - receiving intermittent dosing  1 each Does not apply See Admin Instructions     @[email or web address]-@          Physical Exam:   Vitals were reviewed  Blood pressure 129/86, pulse 80, temperature 96.8  F (36  C), temperature source Axillary, resp. rate 19, height 1.626 m (5' 4\"), weight 92.3 kg (203 lb 7.8 oz), SpO2 91 %.  Rhythm: paced    Lungs: diminished bases    Cardiovascular: s1/s2, no m/r/g    Abdomen: s/nt/nd    Extremeties: no LE edema    Incision: cdi    CT: na    Weight:   Vitals:    19 0600 19 0600 19 0000 19 0600   Weight: 84.5 kg (186 lb 4.6 oz) 86 kg (189 lb 9.5 oz) 86.9 kg (191 lb 9.3 oz) 88.7 kg (195 lb 8.8 oz)    19 0600   Weight: 92.3 " kg (203 lb 7.8 oz)            Data:   Labs:   Lab Results   Component Value Date    WBC 20.9 01/12/2019     Lab Results   Component Value Date    RBC 3.08 01/12/2019     Lab Results   Component Value Date    HGB 9.1 01/12/2019     Lab Results   Component Value Date    HCT 26.3 01/12/2019     No components found for: MCT  Lab Results   Component Value Date    MCV 85 01/12/2019     Lab Results   Component Value Date    MCH 29.5 01/12/2019     Lab Results   Component Value Date    MCHC 34.6 01/12/2019     Lab Results   Component Value Date    RDW 15.8 01/12/2019     Lab Results   Component Value Date     01/12/2019       Last Basic Metabolic Panel:  Lab Results   Component Value Date     01/12/2019      Lab Results   Component Value Date    POTASSIUM 4.2 01/12/2019     Lab Results   Component Value Date    CHLORIDE 108 01/12/2019     Lab Results   Component Value Date    BESS 8.1 01/12/2019     Lab Results   Component Value Date    CO2 16 01/12/2019     Lab Results   Component Value Date    BUN 62 01/12/2019     Lab Results   Component Value Date    CR 1.67 01/12/2019     Lab Results   Component Value Date     01/12/2019       CXR: IMPRESSION: There has been interval placement of a single lead  pacemaker implanted over the left chest with lead in the right  ventricle. Median sternotomy changes again noted. Elevated right  hemidiaphragm again noted. There is new patchy airspace opacity in the  central right lung that could represent atelectasis or pneumonia.  Pulmonary edema is also possible. There are no other airspace  opacities in either lung. There may be a small right pleural effusion.  There is no pleural fluid on the left. There is no pneumothorax.  Moderate cardiomegaly again noted    Shania June PA-C  Pager #: [phone]

## 2019-01-12 NOTE — PROGRESS NOTES
ICU Staff:    Patient hypotensive with drop in Hgb to 7.0 gm/dL.  Will transfuse 1 unit PRBCs.  Will also give 25% Human Albumin while waiting for transfused PRBCs. Will follow SBP.  Patient with signs of fluid overload will start Lasix drip.      Ciro Hanson MD, PhD

## 2019-01-12 NOTE — PROGRESS NOTES
ICU Staff:    Low UR.  Will give fluid first in the form of 25% albumin and small maintenance drip and follow UO.  Will re-assess following fluid bolus.      Ciro Hanson MD, PhD

## 2019-01-12 NOTE — CONSULTS
CLINICAL NUTRITION SERVICES  -  ASSESSMENT NOTE      Recommendations Ordered by Registered Dietitian (RD):   Begin TPN D15 AA5 at 40 mL/hr;  After 24 hrs increase to goal 65 mL/hr + Lipids 250 mL daily = 1608 kcals (26 kcal/kg), 78 gm pro (1.3 gm/kg), 234 gm CHO, 31% fat  Will decrease maintenance IVF when TPN begins so total fluid (TPN + IVF) = 75 mL/hr.   Malnutrition: (1/12)  % Weight Loss:  None noted  % Intake:  </= 50% for >/= 5 days (severe malnutrition)  Subcutaneous Fat Loss:  None observed  Muscle Loss:  Temporal region mild depletion and Clavicle bone region mild depletion  Fluid Retention:  Mild - Could be partly nutritional due to prolonged poor nutrition    Malnutrition Diagnosis: Non-Severe malnutrition  In Context of:  Acute illness or injury        REASON FOR ASSESSMENT  Priya Funez is a 78 year old female seen by Registered Dietitian for Pharmacy/Nutrition to Start and Manage PN      NUTRITION HISTORY  Per RD visit with patient on 1/10:  Pt reports that she does most of the shopping and cooking at home.  She usually has 3 meals per day + snacks with usual good appetite and intake.  She states she thinks she has gained some weight over the past year due to eating too many sweets.    No food allergies/intolerances.  No supplements taken at home.    CURRENT NUTRITION ORDERS  Diet Order:     NPO   Was asked to initiate TPN for patient as PICC line planned.  Indication:  Ischemic bowel.    Current Intake/Tolerance:  Diet had been CL with Alan supplements BID (10am and dinner).  Pt took only 120 mL oral yesterday.  Per Shania QUINONES, AXR bowel appears edematous, made NPO, gen surg consulted, abdomen distended/hard. SMA was occluded prior to surgery.  Surgery has advised TPN as pt has been without adequate nutrition since admission x 8 days.    1/4: Procedure = aortic dissection repair with graft  1/5: Extubated   1/11:  US Thoracentesis = 150 mL removed  1/12:  AXR = No evidence for bowel obstruction  or free air.      PHYSICAL FINDINGS  Observed  Muscle Wasting - temporal, clavicle  Obtained from Chart/Interdisciplinary Team  Edema - 2+ mild generalized    ANTHROPOMETRICS  Ht: 5 ft 4 in  Admit Wt:  84 kg kg    Current Wt:  92.3 kg (up 8.3 kg since admit)  BMI:  31.8  IBW:  54.5 kg  Weight Status: Obesity Grade I BMI 30-34.9  %IBW:  154%  Weight History:  Pt had reported some weight gain over the past year as above.    Wt Readings from Last 20 Encounters:   01/12/19 92.3 kg (203 lb 7.8 oz)   09/14/08 80.7 kg (178 lb)       LABS  Labs reviewed  BUN 62 (H)  Cr 1.67 (H) - Pt with ARF due to atrophic right kidney  K 4.2 (NL)  Lab add ons Mg and Phos pending.    MEDICATIONS  Medications reviewed  IVF NaCl at 75 mL/hr - for fluid delivery  Lasix off      ASSESSED NUTRITION NEEDS PER APPROVED PRACTICE GUIDELINES:    Dosing Weight:  62 kg (adjusted for overweight)  Estimated Energy Needs:  7170-0454 kcals (25-30 Kcal/Kg)  Justification: overweight  Estimated Protein Needs:  74-87 grams protein (1.2-1.4 g pro/Kg)  Justification: Repletion, post-op and atrophic right kidney (monitor tolerance to protein and liberalize as able)  Estimated Fluid Needs: 7714-6773 mL (1 mL/Kcal)  Justification: maintenance    MALNUTRITION:  % Weight Loss:  None noted  % Intake:  </= 50% for >/= 5 days (severe malnutrition)  Subcutaneous Fat Loss:  None observed  Muscle Loss:  Temporal region mild depletion and Clavicle bone region mild depletion  Fluid Retention:  Mild - Could be partly nutritional due to prolonged poor nutrition    Malnutrition Diagnosis: Non-Severe malnutrition  In Context of:  Acute illness or injury    NUTRITION DIAGNOSIS:  Inadequate protein-energy intake related to altered GI function s/p heart surgery as evidenced by decreased nutritional intake since admit x 8 days, meeting 0% needs, and TPN planned.      NUTRITION INTERVENTIONS  Recommendations / Nutrition Prescription  Begin TPN D15 AA5 at 40 mL/hr;  After 24 hrs  increase to goal 65 mL/hr + Lipids 250 mL daily = 1608 kcals (26 kcal/kg), 78 gm pro (1.3 gm/kg), 234 gm CHO, 31% fat  Will decrease maintenance IVF when TPN begins so total fluid (TPN + IVF) = 75 mL/hr.      Implementation  Nutrition education: Not appropriate at this time due to patient condition  Collaboration and Referral of Nutrition care - Discussed with Pharmacist my plan to start Clinimix.  Discussed briefly with surgery and PA-C regarding the need for TPN.  PN Composition, PN Schedule - Entered TPN orders in [facility] as above  Medical food supplement therapy - Canceled Alan supplements    Nutrition Goals  TPN/Lipid will meet % estimated needs in 48-72 hrs.      MONITORING AND EVALUATION:  Progress towards goals will be monitored and evaluated per protocol and Practice Guidelines    Nazanin Todd, RD, LD, CNSC

## 2019-01-12 NOTE — PLAN OF CARE
100% paced at 80 on tele. Scheduled metoprolol held for SBP < 95. Pt remained hypotensive with low UOP, albumin given with little effect. 1 unit PRBC for drop in Hgb. Lasix gtt started for s/s fluid overload. Crackles in lung bases now resolved, diminished. Pt tachypneic and c/o dyspnea with increased WOB while on HFNC 60% 35 LPM, hence on BiPAP majority of the night. Pt c/o being thirsty; given moistened swabs only due to frequent coughing with sips of water. Groin sites remain firm (unchanged), no hematoma or bruit, palpable distal pulses.

## 2019-01-12 NOTE — PLAN OF CARE
Lethargic/sedated from EP procedure however. A/O x4.  Returned from cath lab 1745.  Had PPM placed then AV node ablation. Pt has been in SR since return to unit rate 80's.  Was on high katie NC most the day until given sedation and sats dropped.  Currently on bipap FiO2 60% sating 99%.  Will reassess when more awake to transition back to highflo.  Has left thora today with 150cc taken off.  CDI.

## 2019-01-12 NOTE — PHARMACY-VANCOMYCIN DOSING SERVICE
Pharmacy Vancomycin Initial Note  Date of Service 2019  Patient's  1940  78 year old, female    Indication: Sepsis    Current estimated CrCl = Estimated Creatinine Clearance: 31.5 mL/min (A) (based on SCr of 1.67 mg/dL (H)).    Creatinine for last 3 days  1/10/2019:  6:05 AM Creatinine 1.44 mg/dL  2019:  9:03 AM Creatinine 1.44 mg/dL  2019:  1:15 AM Creatinine 1.54 mg/dL;  7:45 AM Creatinine 1.67 mg/dL    Recent Vancomycin Level(s) for last 3 days  No results found for requested labs within last 72 hours.      Vancomycin IV Administrations (past 72 hours)      No vancomycin orders with administrations in past 72 hours.                Nephrotoxins and other renal medications (From now, onward)    Start     Dose/Rate Route Frequency Ordered Stop    19 0930  vancomycin (VANCOCIN) 2,000 mg in sodium chloride 0.9 % 500 mL intermittent infusion      2,000 mg  over 2 Hours Intravenous ONCE 19 0909      19 0909  vancomycin place frausto - receiving intermittent dosing      1 each Does not apply SEE ADMIN INSTRUCTIONS 19 0909      19 0445  furosemide (LASIX) 100 mg in sodium chloride 0.9 % 100 mL infusion      5 mg/hr  5 mL/hr  Intravenous CONTINUOUS 19 0444      19 2300  phenylephrine (PHOEBE-SYNEPHRINE) 50 mg in sodium chloride 0.9 % 250 mL infusion      0.5-2 mcg/kg/min × 77.1 kg  11.6-46.3 mL/hr  Intravenous CONTINUOUS 19 2253            Contrast Orders - past 72 hours (72h ago, onward)    None                Plan:  1.  Start vancomycin  2000 mg IV once then intermittent dosing   2.  Goal Trough Level: 15-20 mg/L   3.  Pharmacy will check trough levels as appropriate in 1-3 Days.    4. Serum creatinine levels will be ordered daily for the first week of therapy and at least twice weekly for subsequent weeks.    5. [place] method utilized to dose vancomycin therapy: Method 1    Serafin Holly

## 2019-01-12 NOTE — PROGRESS NOTES
RADIOLOGY PROCEDURE NOTE  Patient name: Priya Funez  MRN: [id number]  : 1940    Pre-procedure diagnosis: Left pleural effusion  Post-procedure diagnosis: Same    Procedure Date/Time: 2019  8:08 AM  Procedure: Thoracentesis  Estimated blood loss: None  Specimen(s) collected with description: Pleural fluid.  The patient tolerated the procedure well with no immediate complications.    See imaging dictation for procedural details and findings.    Provider name: Terry Guardado  Assistant(s):None

## 2019-01-12 NOTE — PROGRESS NOTES
Pt has been on bipap 10/5 50% t/o the day.  Alarm limit set at 10.  Will continue to follow.  Jose Enrique Wright  1/12/2019

## 2019-01-13 ENCOUNTER — APPOINTMENT (OUTPATIENT)
Dept: ULTRASOUND IMAGING | Facility: CLINIC | Age: 79
DRG: 003 | End: 2019-01-13
Payer: COMMERCIAL

## 2019-01-13 ENCOUNTER — APPOINTMENT (OUTPATIENT)
Dept: GENERAL RADIOLOGY | Facility: CLINIC | Age: 79
DRG: 003 | End: 2019-01-13
Payer: COMMERCIAL

## 2019-01-13 LAB
ALBUMIN SERPL-MCNC: 2.4 G/DL (ref 3.4–5)
ALP SERPL-CCNC: 85 U/L (ref 40–150)
ALT SERPL W P-5'-P-CCNC: 26 U/L (ref 0–50)
ANION GAP SERPL CALCULATED.3IONS-SCNC: 11 MMOL/L (ref 3–14)
ANION GAP SERPL CALCULATED.3IONS-SCNC: 14 MMOL/L (ref 3–14)
AST SERPL W P-5'-P-CCNC: 33 U/L (ref 0–45)
BASE DEFICIT BLDA-SCNC: 2.6 MMOL/L
BILIRUB SERPL-MCNC: 2.2 MG/DL (ref 0.2–1.3)
BUN SERPL-MCNC: 61 MG/DL (ref 7–30)
BUN SERPL-MCNC: 63 MG/DL (ref 7–30)
CALCIUM SERPL-MCNC: 7.8 MG/DL (ref 8.5–10.1)
CALCIUM SERPL-MCNC: 8 MG/DL (ref 8.5–10.1)
CHLORIDE SERPL-SCNC: 111 MMOL/L (ref 94–109)
CHLORIDE SERPL-SCNC: 112 MMOL/L (ref 94–109)
CO2 SERPL-SCNC: 23 MMOL/L (ref 20–32)
CO2 SERPL-SCNC: 28 MMOL/L (ref 20–32)
CREAT SERPL-MCNC: 1.79 MG/DL (ref 0.52–1.04)
CREAT SERPL-MCNC: 1.84 MG/DL (ref 0.52–1.04)
ERYTHROCYTE [DISTWIDTH] IN BLOOD BY AUTOMATED COUNT: 15.8 % (ref 10–15)
GFR SERPL CREATININE-BSD FRML MDRD: 26 ML/MIN/{1.73_M2}
GFR SERPL CREATININE-BSD FRML MDRD: 27 ML/MIN/{1.73_M2}
GLUCOSE BLDC GLUCOMTR-MCNC: 101 MG/DL (ref 70–99)
GLUCOSE BLDC GLUCOMTR-MCNC: 109 MG/DL (ref 70–99)
GLUCOSE BLDC GLUCOMTR-MCNC: 111 MG/DL (ref 70–99)
GLUCOSE BLDC GLUCOMTR-MCNC: 113 MG/DL (ref 70–99)
GLUCOSE BLDC GLUCOMTR-MCNC: 76 MG/DL (ref 70–99)
GLUCOSE SERPL-MCNC: 111 MG/DL (ref 70–99)
GLUCOSE SERPL-MCNC: 98 MG/DL (ref 70–99)
GRAM STN SPEC: NORMAL
GRAM STN SPEC: NORMAL
HCO3 BLD-SCNC: 21 MMOL/L (ref 21–28)
HCT VFR BLD AUTO: 23.1 % (ref 35–47)
HGB BLD-MCNC: 8.1 G/DL (ref 11.7–15.7)
INR PPP: 1.59 (ref 0.86–1.14)
LACTATE BLD-SCNC: 1.2 MMOL/L (ref 0.7–2)
LACTATE BLD-SCNC: 1.2 MMOL/L (ref 0.7–2)
Lab: NORMAL
MAGNESIUM SERPL-MCNC: 3 MG/DL (ref 1.6–2.3)
MCH RBC QN AUTO: 29.5 PG (ref 26.5–33)
MCHC RBC AUTO-ENTMCNC: 35.1 G/DL (ref 31.5–36.5)
MCV RBC AUTO: 84 FL (ref 78–100)
OXYHGB MFR BLD: 98 % (ref 92–100)
PCO2 BLD: 31 MM HG (ref 35–45)
PH BLD: 7.44 PH (ref 7.35–7.45)
PHOSPHATE SERPL-MCNC: 3.6 MG/DL (ref 2.5–4.5)
PLATELET # BLD AUTO: 189 10E9/L (ref 150–450)
PO2 BLD: 110 MM HG (ref 80–105)
POTASSIUM SERPL-SCNC: 3.1 MMOL/L (ref 3.4–5.3)
POTASSIUM SERPL-SCNC: 3.5 MMOL/L (ref 3.4–5.3)
PROCALCITONIN SERPL-MCNC: 3.72 NG/ML
PROT SERPL-MCNC: 5.1 G/DL (ref 6.8–8.8)
RBC # BLD AUTO: 2.75 10E12/L (ref 3.8–5.2)
SODIUM SERPL-SCNC: 149 MMOL/L (ref 133–144)
SODIUM SERPL-SCNC: 150 MMOL/L (ref 133–144)
SPECIMEN SOURCE: NORMAL
VANCOMYCIN SERPL-MCNC: 22.2 MG/L
WBC # BLD AUTO: 21.6 10E9/L (ref 4–11)

## 2019-01-13 PROCEDURE — 25000128 H RX IP 250 OP 636: Performed by: STUDENT IN AN ORGANIZED HEALTH CARE EDUCATION/TRAINING PROGRAM

## 2019-01-13 PROCEDURE — 84100 ASSAY OF PHOSPHORUS: CPT | Performed by: SURGERY

## 2019-01-13 PROCEDURE — 36415 COLL VENOUS BLD VENIPUNCTURE: CPT | Performed by: SURGERY

## 2019-01-13 PROCEDURE — 25000128 H RX IP 250 OP 636: Performed by: SURGERY

## 2019-01-13 PROCEDURE — 25000128 H RX IP 250 OP 636

## 2019-01-13 PROCEDURE — C9113 INJ PANTOPRAZOLE SODIUM, VIA: HCPCS | Performed by: SURGERY

## 2019-01-13 PROCEDURE — 27210437 ZZH NUTRITION PRODUCT SEMIELEM INTERMED LITER

## 2019-01-13 PROCEDURE — 80048 BASIC METABOLIC PNL TOTAL CA: CPT

## 2019-01-13 PROCEDURE — 25000132 ZZH RX MED GY IP 250 OP 250 PS 637

## 2019-01-13 PROCEDURE — 25000125 ZZHC RX 250

## 2019-01-13 PROCEDURE — 83605 ASSAY OF LACTIC ACID: CPT | Performed by: NURSE PRACTITIONER

## 2019-01-13 PROCEDURE — 83605 ASSAY OF LACTIC ACID: CPT | Performed by: SURGERY

## 2019-01-13 PROCEDURE — 25000132 ZZH RX MED GY IP 250 OP 250 PS 637: Performed by: INTERNAL MEDICINE

## 2019-01-13 PROCEDURE — 80202 ASSAY OF VANCOMYCIN: CPT | Performed by: SURGERY

## 2019-01-13 PROCEDURE — 00000146 ZZHCL STATISTIC GLUCOSE BY METER IP

## 2019-01-13 PROCEDURE — 25000125 ZZHC RX 250: Performed by: PHYSICIAN ASSISTANT

## 2019-01-13 PROCEDURE — 36556 INSERT NON-TUNNEL CV CATH: CPT

## 2019-01-13 PROCEDURE — 82805 BLOOD GASES W/O2 SATURATION: CPT | Performed by: SURGERY

## 2019-01-13 PROCEDURE — 74018 RADEX ABDOMEN 1 VIEW: CPT

## 2019-01-13 PROCEDURE — 85610 PROTHROMBIN TIME: CPT | Performed by: SURGERY

## 2019-01-13 PROCEDURE — 25000132 ZZH RX MED GY IP 250 OP 250 PS 637: Performed by: THORACIC SURGERY (CARDIOTHORACIC VASCULAR SURGERY)

## 2019-01-13 PROCEDURE — 85027 COMPLETE CBC AUTOMATED: CPT | Performed by: SURGERY

## 2019-01-13 PROCEDURE — 40000986 XR CHEST PORT 1 VW

## 2019-01-13 PROCEDURE — 25000128 H RX IP 250 OP 636: Performed by: THORACIC SURGERY (CARDIOTHORACIC VASCULAR SURGERY)

## 2019-01-13 PROCEDURE — 94003 VENT MGMT INPAT SUBQ DAY: CPT

## 2019-01-13 PROCEDURE — 40000008 ZZH STATISTIC AIRWAY CARE

## 2019-01-13 PROCEDURE — 99291 CRITICAL CARE FIRST HOUR: CPT | Mod: 25

## 2019-01-13 PROCEDURE — 40000275 ZZH STATISTIC RCP TIME EA 10 MIN

## 2019-01-13 PROCEDURE — 99231 SBSQ HOSP IP/OBS SF/LOW 25: CPT | Performed by: SURGERY

## 2019-01-13 PROCEDURE — 93971 EXTREMITY STUDY: CPT | Mod: RT

## 2019-01-13 PROCEDURE — 80053 COMPREHEN METABOLIC PANEL: CPT | Performed by: SURGERY

## 2019-01-13 PROCEDURE — 20000003 ZZH R&B ICU

## 2019-01-13 PROCEDURE — 05HM33Z INSERTION OF INFUSION DEVICE INTO RIGHT INTERNAL JUGULAR VEIN, PERCUTANEOUS APPROACH: ICD-10-PCS

## 2019-01-13 PROCEDURE — 84145 PROCALCITONIN (PCT): CPT | Performed by: SURGERY

## 2019-01-13 PROCEDURE — 40000239 ZZH STATISTIC VAT ROUNDS

## 2019-01-13 PROCEDURE — 83735 ASSAY OF MAGNESIUM: CPT | Performed by: SURGERY

## 2019-01-13 RX ORDER — METHOCARBAMOL 500 MG/1
500 TABLET, FILM COATED ORAL 4 TIMES DAILY PRN
Status: DISCONTINUED | OUTPATIENT
Start: 2019-01-13 | End: 2019-01-29 | Stop reason: HOSPADM

## 2019-01-13 RX ORDER — WARFARIN SODIUM 2.5 MG/1
2.5 TABLET ORAL
Status: COMPLETED | OUTPATIENT
Start: 2019-01-13 | End: 2019-01-13

## 2019-01-13 RX ORDER — HEPARIN SODIUM 5000 [USP'U]/.5ML
5000 INJECTION, SOLUTION INTRAVENOUS; SUBCUTANEOUS EVERY 8 HOURS
Status: DISCONTINUED | OUTPATIENT
Start: 2019-01-13 | End: 2019-01-16

## 2019-01-13 RX ORDER — MEROPENEM 500 MG/1
500 INJECTION, POWDER, FOR SOLUTION INTRAVENOUS EVERY 8 HOURS
Status: DISCONTINUED | OUTPATIENT
Start: 2019-01-13 | End: 2019-01-19

## 2019-01-13 RX ORDER — ASPIRIN 81 MG/1
81 TABLET, CHEWABLE ORAL DAILY
Status: DISCONTINUED | OUTPATIENT
Start: 2019-01-13 | End: 2019-01-29 | Stop reason: HOSPADM

## 2019-01-13 RX ORDER — CHLOROTHIAZIDE SODIUM 500 MG/1
500 INJECTION INTRAVENOUS ONCE
Status: COMPLETED | OUTPATIENT
Start: 2019-01-13 | End: 2019-01-13

## 2019-01-13 RX ORDER — METOPROLOL TARTRATE 1 MG/ML
2.5 INJECTION, SOLUTION INTRAVENOUS EVERY 6 HOURS
Status: DISCONTINUED | OUTPATIENT
Start: 2019-01-13 | End: 2019-01-22

## 2019-01-13 RX ORDER — NYSTATIN 100000/ML
500000 SUSPENSION, ORAL (FINAL DOSE FORM) ORAL 4 TIMES DAILY
Status: DISCONTINUED | OUTPATIENT
Start: 2019-01-13 | End: 2019-01-29

## 2019-01-13 RX ADMIN — METOPROLOL TARTRATE 5 MG: 5 INJECTION, SOLUTION INTRAVENOUS at 05:54

## 2019-01-13 RX ADMIN — PANTOPRAZOLE SODIUM 40 MG: 40 INJECTION, POWDER, FOR SOLUTION INTRAVENOUS at 21:55

## 2019-01-13 RX ADMIN — DEXMEDETOMIDINE HYDROCHLORIDE 0.2 MCG/KG/HR: 100 INJECTION, SOLUTION INTRAVENOUS at 10:13

## 2019-01-13 RX ADMIN — MULTIVITAMIN 15 ML: LIQUID ORAL at 12:40

## 2019-01-13 RX ADMIN — POTASSIUM CHLORIDE 20 MEQ: 400 INJECTION, SOLUTION INTRAVENOUS at 08:00

## 2019-01-13 RX ADMIN — HEPARIN SODIUM 5000 UNITS: 10000 INJECTION, SOLUTION INTRAVENOUS; SUBCUTANEOUS at 10:23

## 2019-01-13 RX ADMIN — POTASSIUM CHLORIDE 20 MEQ: 400 INJECTION, SOLUTION INTRAVENOUS at 20:34

## 2019-01-13 RX ADMIN — WARFARIN SODIUM 2.5 MG: 2.5 TABLET ORAL at 17:58

## 2019-01-13 RX ADMIN — MEROPENEM 500 MG: 500 INJECTION, POWDER, FOR SOLUTION INTRAVENOUS at 17:25

## 2019-01-13 RX ADMIN — CALCIUM GLUCONATE 1 G: 98 INJECTION, SOLUTION INTRAVENOUS at 00:53

## 2019-01-13 RX ADMIN — CHLOROTHIAZIDE SODIUM 500 MG: 500 INJECTION, POWDER, LYOPHILIZED, FOR SOLUTION INTRAVENOUS at 15:56

## 2019-01-13 RX ADMIN — POTASSIUM CHLORIDE 20 MEQ: 400 INJECTION, SOLUTION INTRAVENOUS at 21:38

## 2019-01-13 RX ADMIN — HEPARIN SODIUM 5000 UNITS: 10000 INJECTION, SOLUTION INTRAVENOUS; SUBCUTANEOUS at 17:25

## 2019-01-13 RX ADMIN — VANCOMYCIN HYDROCHLORIDE 2000 MG: 5 INJECTION, POWDER, LYOPHILIZED, FOR SOLUTION INTRAVENOUS at 17:58

## 2019-01-13 RX ADMIN — SODIUM CHLORIDE: 9 INJECTION, SOLUTION INTRAVENOUS at 00:21

## 2019-01-13 RX ADMIN — MEROPENEM 500 MG: 500 INJECTION, POWDER, FOR SOLUTION INTRAVENOUS at 09:24

## 2019-01-13 RX ADMIN — SODIUM CHLORIDE: 9 INJECTION, SOLUTION INTRAVENOUS at 14:28

## 2019-01-13 RX ADMIN — FUROSEMIDE 5 MG/HR: 10 INJECTION, SOLUTION INTRAVENOUS at 17:58

## 2019-01-13 RX ADMIN — NYSTATIN 500000 UNITS: 500000 SUSPENSION ORAL at 20:23

## 2019-01-13 RX ADMIN — SODIUM BICARBONATE 20 ML/HR: 84 INJECTION, SOLUTION INTRAVENOUS at 02:40

## 2019-01-13 RX ADMIN — ASPIRIN 81 MG 81 MG: 81 TABLET ORAL at 12:40

## 2019-01-13 RX ADMIN — FUROSEMIDE 5 MG/HR: 10 INJECTION, SOLUTION INTRAVENOUS at 01:36

## 2019-01-13 ASSESSMENT — ACTIVITIES OF DAILY LIVING (ADL)
ADLS_ACUITY_SCORE: 11
ADLS_ACUITY_SCORE: 13
ADLS_ACUITY_SCORE: 11
ADLS_ACUITY_SCORE: 17
ADLS_ACUITY_SCORE: 11
ADLS_ACUITY_SCORE: 12

## 2019-01-13 ASSESSMENT — MIFFLIN-ST. JEOR: SCORE: 1400

## 2019-01-13 NOTE — PROGRESS NOTES
Patient seen, family at bedside.    WBC continues to increase, lactate normal, Cr 1.7.    Abd exam stable, remains distended, minimally tender to palpation, no guarding, rigidity or other signs of peritonitis.    Unable to insert PICC line today thus TPN not started.  Discussed with ICU team and will plan for serial abdominal exams and to search for other signs of infection.  No sign of bowel ischemia.    Will monitor closely.    Saige Brown MD  Vascular Surgery Fellow  Pager [phone]

## 2019-01-13 NOTE — PROGRESS NOTES
Renal Medicine Progress Note            Assessment/Plan:     78 y.o woman with type A aortic dissection, following for ARF.      # Patient with a serum creatinine of 0.8 mg/dl on admission.      # ARF. Scr ~ 1.6-1.9 since surgery. Scr was 1.44 mg/d when I signed off.                -IV contrast on 1/4               -dissection involved the R renal artery               -duplex showed atrophic RK and not much flow into the right kidney and renogram showed 34% function on the RK and 66% function on the left kidney                                                                                                                                    # Type A aortic dissection s/p repair. Dissected all the way down to the abdominal aortic bifurcation into the left and right common iliac arteries.     # Anemia and thrombocytopenia. Pretty Fe def.     # HCAP. On Cefepime and vancomycin.     Plan.   # Can increase Lasix gtt to 10 mg/hr +- one dose of Diuril 500 mg  # Would titrate NE MAP 75-80  # Needs free water for hypernatremia  # Concentrate and limit IV fluid        Interval History:     Patient was re-intubated on 1/12 for worsening respiratory distress. CXR showed new right lung infiltrate. She has been on cefepime and vancomycin. Currently on low dose NE and Lasix gtt at 5 mg/hr. SBP is in the high 80s.     Weight is up from 77 kg -->92.5 kg? Not much Edema in the lower legs, but weight is significant. I think a lot of fluid is in the truncal area and abdomen.           Medications and Allergies:       aspirin  81 mg Per Feeding Tube Daily     heparin  5,000 Units Subcutaneous Q8H     influenza Vac Split High-Dose  0.5 mL Intramuscular Prior to discharge     insulin aspart  1-6 Units Subcutaneous Q4H     lidocaine (PF)  10 mL Subcutaneous Once     meropenem  500 mg Intravenous Q8H     metoprolol  2.5 mg Intravenous Q6H     multivitamins w/minerals  15 mL Per Feeding Tube Daily     pantoprazole (PROTONIX) IV  40 mg  "Intravenous Q24H     sodium chloride (PF)  10 mL Intracatheter Q8H     sodium chloride (PF)  3 mL Intracatheter Q8H     sodium chloride (PF)  3 mL Intracatheter Q8H     sodium chloride (PF)  3 mL Intracatheter Q8H     vancomycin place frausto - receiving intermittent dosing  1 each Does not apply See Admin Instructions     warfarin  2.5 mg Oral ONCE at 18:00        Allergies   Allergen Reactions     Amoxicillin Swelling     Ciprofloxacin Swelling            Physical Exam:   Vitals were reviewed  Heart Rate: 80, Blood pressure 97/67, pulse 80, temperature 97.9  F (36.6  C), temperature source Axillary, resp. rate 21, height 1.626 m (5' 4\"), weight 92.5 kg (203 lb 14.8 oz), SpO2 94 %.    Wt Readings from Last 3 Encounters:   01/13/19 92.5 kg (203 lb 14.8 oz)   09/14/08 80.7 kg (178 lb)       Intake/Output Summary (Last 24 hours) at 1/13/2019 1133  Last data filed at 1/13/2019 1024  Gross per 24 hour   Intake 2389.8 ml   Output 1828 ml   Net 561.8 ml       GENERAL APPEARANCE: Critically ill.   HEENT:  Intubated.   RESP: Good airflow anteriorly.   CV: RRR, nl S1/S2,  ABDOMEN: obese, soft, NT  EXTREMITIES/SKIN: no rashes/lesions on observed skin; Not much edema in the lower legs, 1+ edema at the thighs.    Neuro: Sedated.          Data:     CBC RESULTS:     Recent Labs   Lab 01/13/19  0600 01/12/19 2225 01/12/19  1727 01/12/19  0745 01/12/19  0115 01/11/19  0903   WBC 21.6* 19.7* 22.9* 20.9* 15.7* 15.6*   RBC 2.75* 2.74* 2.98* 3.08* 2.42* 2.85*   HGB 8.1* 8.0* 8.7* 9.1* 7.0* 8.2*   HCT 23.1* 22.9* 24.9* 26.3* 20.5* 24.1*    176 200 199 171 186       Basic Metabolic Panel:  Recent Labs   Lab 01/13/19  0600 01/12/19  2225 01/12/19  1727 01/12/19  1203 01/12/19  0745 01/12/19  0115 01/11/19  1046 01/11/19  0903   * 146* 141  --  137 136  --  137   POTASSIUM 3.5 3.5 3.7  --  4.2 4.1 4.3 4.3   CHLORIDE 112* 111* 110*  --  108 108  --  108   CO2 23 20 15* 14* 16* 14*  --  17*   BUN 61* 61* 61*  --  62* 59*  --  " 53*   CR 1.79* 1.74* 1.75*  --  1.67* 1.54*  --  1.44*   GLC 98 101* 96  --  102* 103*  --  89   BESS 7.8* 7.3* 7.8*  --  8.1* 7.9*  --  8.1*       INR  Recent Labs   Lab 01/13/19  0600 01/11/19  1046   INR 1.59* 1.28*      Attestation:   I have reviewed today's relevant vital signs, notes, medications, labs and imaging.    Darwin Peterson MD  [facility] - Nephrology  Office phone :[phone]  Pager: [phone]

## 2019-01-13 NOTE — PHARMACY-ANTICOAGULATION SERVICE
Clinical Pharmacy - Warfarin Dosing Consult     Pharmacy has been consulted to manage this patient s warfarin therapy.  Indication: Atrial Fibrillation  Therapy Goal: INR 2-3  Warfarin Prior to Admission: No    INR   Date Value Ref Range Status   01/13/2019 1.59 (H) 0.86 - 1.14 Final   01/11/2019 1.28 (H) 0.86 - 1.14 Final       Recommend warfarin 2.5 mg today.  Pharmacy will monitor Priya MARISOL Armin daily and order warfarin doses to achieve specified goal.      Please contact pharmacy as soon as possible if the warfarin needs to be held for a procedure or if the warfarin goals change.

## 2019-01-13 NOTE — PLAN OF CARE
Neuro: Sedation changed from versed to dex gtt.  Opens eyes, moves all extremities however, CAM positive.    Cardiac:  100% paced.    Resp:  Remains on full vent support.  FiO2 decreased to 40%.  LS are coarse throughout.   Meropenum started for potential hospital acquired pneumonia.    GI:  Will start trickle tube feedings-Ok per general surgery.  Abdomen is distended but non tender.  Continues to make loose stool with no signs of blood.    :  Diuresing per IV lasix.    Labs: potassium replaced.    Plan: Central line placed at bedside.  Will continue full vent support and reassess SBT tomorrow.

## 2019-01-13 NOTE — PROCEDURES
Arterial Line Placement:    Ultrasound guidance used.  Showed small, pulsatile left radial artery.  Consent obtained.  Skin prepped with chlorhexadine.  On second attempt, the catheter was inserted with pulsatile blood flow and a good wave form. Suture in placed.     Srikanth Meléndez MD

## 2019-01-13 NOTE — PLAN OF CARE
Remained on bipap all day due to increased wob.  Several abnormal labs (see flowsheet).  Na HCO3 gtt started for serum bicarb 14.  Abdominal x-ray with ileus.  Gen Surg consulted (see note).  Consider TPN however unable to insert PICC.  Midline inserted to subclavian however, this appeared to possible infiltrate-no blood return despite several trouble shooting maneuvers.  Will have IV team assess in the am as they were gone.  Albumin 5% given for low UOP which appeared to respond.  Will continue to monitor. Kidney functioning worsening.  All MD's aware.  Trending several labs.  Arterial line placed at bedside.  Family members have been updated.  Daughter yuliya updated this evening by Dr. Meléndez.

## 2019-01-13 NOTE — PLAN OF CARE
Patient intubated last night at 2030 - see ICU progress note. Patient's daughter agreeable to POC and updated at bedside at 2200. Pt sedated with versed and fentanyl, briefly opens eyes to voice but does not follow commands. Required low doses of levophed (0.03-0.1) to support BP overnight. Paced at 80 BPM. Lungs coarse, on CMV 50% PEEP 7. UOP much improved after lasix gtt restarted. Bicarb cont to infuse.

## 2019-01-13 NOTE — PROGRESS NOTES
Vascular Surgery Progress Note    Intubated overnight for respiratory distress, continues to have liquid stool in flexiseal.  Abdominal exam stable, no lactic acidosis    B/P: 118/71, T: 97.9, P: 80, R: 21  Vented sedated  Abdominal exam stable, soft no guarding or rigidity, mild to moderate distension  Feet warm, palpable pedal pulses.    WBC   Date Value Ref Range Status   01/13/2019 21.6 (H) 4.0 - 11.0 10e9/L Final   ]  Hemoglobin   Date Value Ref Range Status   01/13/2019 8.1 (L) 11.7 - 15.7 g/dL Final   ]  INR   Date Value Ref Range Status   01/13/2019 1.59 (H) 0.86 - 1.14 Final      Creatinine   Date Value Ref Range Status   01/13/2019 1.79 (H) 0.52 - 1.04 mg/dL Final   ]      Intake/Output Summary (Last 24 hours) at 1/13/2019 0956  Last data filed at 1/13/2019 0926  Gross per 24 hour   Intake 2408.55 ml   Output 1863 ml   Net 545.55 ml       Assessment/Plan:  78 year old female with complex Type A dissection, POD#9 s/p repair     Vent management per ICU  Paced at HR of 80  Serial abdominal exams, Gen surg cleared patient for trophic tube feeds.  Consideration for TPN if able to provide adequate access.  Leukocytosis without fever, continue to monitor WBC, follow up cultures. Suspect possible pneumonia.  Currently on vanco, merepenem  No sign of bowel ischemia at this time.  Serial lactate levels.    Saige Brown MD  Vascular Surgery Fellow  Pager [phone]    STAFF:  Benign abdominal exam.  As above.  Discussed with daughter and son.    Ezio Costa MD

## 2019-01-13 NOTE — PHARMACY-VANCOMYCIN DOSING SERVICE
Pharmacy Vancomycin Note  Date of Service 2019  Patient's  1940   78 year old, female    Indication: Sepsis  Goal Trough Level: 15-20 mg/L  Day of Therapy: 2  Current Vancomycin regimen:  Intermittent dosing based on levels  Current estimated CrCl = Estimated Creatinine Clearance: 29.5 mL/min (A) (based on SCr of 1.79 mg/dL (H)).    Creatinine for last 3 days  2019:  9:03 AM Creatinine 1.44 mg/dL  2019:  1:15 AM Creatinine 1.54 mg/dL;  7:45 AM Creatinine 1.67 mg/dL;  5:27 PM Creatinine 1.75 mg/dL; 10:25 PM Creatinine 1.74 mg/dL  2019:  6:00 AM Creatinine 1.79 mg/dL    Recent Vancomycin Levels (past 3 days)  2019: 11:38 AM Vancomycin Level 22.2 mg/L    Vancomycin IV Administrations (past 72 hours)                   vancomycin (VANCOCIN) 2,000 mg in sodium chloride 0.9 % 500 mL intermittent infusion (mg) 2,000 mg New Bag 19 1119                Nephrotoxins and other renal medications (From now, onward)    Start     Dose/Rate Route Frequency Ordered Stop    19 1800  vancomycin (VANCOCIN) 2,000 mg in sodium chloride 0.9 % 500 mL intermittent infusion      2,000 mg  over 2 Hours Intravenous ONCE 19 1234      19 2045  norepinephrine (LEVOPHED) 16 mg in D5W 250 mL infusion      0.03-0.4 mcg/kg/min × 92.3 kg  2.6-34.6 mL/hr  Intravenous CONTINUOUS 19 2044      19 0909  vancomycin place frausto - receiving intermittent dosing      1 each Does not apply SEE ADMIN INSTRUCTIONS 19 0909      19 0445  furosemide (LASIX) 100 mg in sodium chloride 0.9 % 100 mL infusion      5 mg/hr  5 mL/hr  Intravenous CONTINUOUS 19 0444               Contrast Orders - past 72 hours (72h ago, onward)    None          Interpretation of levels and current regimen:  Trough level is  Supratherapeutic    Has serum creatinine changed > 50% in last 72 hours: No    Urine output:  Improving with lasix/bicarb    Renal Function: Worsening    Plan:  1.   redose at  1800 with 2 gm x 1  2.  Pharmacy will check trough levels as appropriate in 1-3 Days.    3. Serum creatinine levels will be ordered daily for the first week of therapy and at least twice weekly for subsequent weeks.      Serafin Holly        .

## 2019-01-13 NOTE — PROVIDER NOTIFICATION
Notified Shania June of increased swelling of right arm.  Currently there is a midline IV catheter infusing medications.  Unable to get blood return.  Will US for clot.

## 2019-01-13 NOTE — PROGRESS NOTES
Critical Care Progress Note      01/13/2019    Name: Priya Funez MRN#: [id number]   Age: 78 year old YOB: 1940     [facility]  [facility] #    MV DAY #             Problem List:   Active Problems:    Ascending aortic dissection (H)  Acute Renal Failure  Acute Hypoxic respiratory failure  Metabolic acidosis.          Summary/Hospital Course:       Priya Funez is a 78 year old female with PMH of HTN admitted on 1/4/2019 with sudden onset of back pain found to have type A aortic dissection and sent for emergency aortic dissection repair with graft.  Was re-intubated on 1/12 for increasing metabolic acidosis and work of breathing.         Assessment and plan :     Priya Funez IS a 78 year old female admitted on 1/4/2019 for Type A aortic dissection extending into the abdomen.    I have personally reviewed the daily labs, imaging studies, cultures     My assessment and plan by system for this patient is as follows:    Neurology/Psychiatry:    Analgesia - fentanyl  Sedation - versed, RASS goal -1.     Cardiovascular:   Hemodynamics - normotensive.  Rhythm - paced.  History of A fib - currently paced. Continue metoprolol.   Type A dissection - dissection extends down past SMA.  Currently on aspirin. CV surgery following.      Pulmonary/Ventilator Management:   Hypoxic Respiratory Failure - Pulmonary edema, pleural effusion on right, possible pneumonia.  Now mechanically ventilated.  Sputum cultures sent, currently getting diuresed on a lasix infusion.  Goal net negative 1 L today.    18/500/50/7. ABG is 7.44/31/110.  Mixed disorder, but predominant seems respiratory alkalosis.     GI and Nutrition :   OG tube placed.  Will discuss with surgery if okay to use gut.  If so, will start trickle feeds and free water.     Renal/Fluids/Electrolytes:   1. Acute renal failure - Since the dissection.  Dissection flap affected renal arteries.  Creatinine has been slightly increasing.  Will continue to  trend.   2. Electrolyte abnormality - hypernatremia.  Will start d5W at 75/hour. Will give free water to gut if okay per surgery.   3. Acid/base status - previous metabolic acidosis. Likely from renal failure.  Was on NaHCO3 infusion. Will stop this now given normal pH and bicarb on BMP > 20.  Will get another BMP later today.   4. Volume status - hypervolemic.  Will continue lasix infusion. Goal net negative 1. 5 L today.      Renal consulted today.     Infectious Disease:   1. Possible pneumonia - sputum cultures sent.  WBC was trending up on cefepime.  Changed to meropenem. Will check C diff.       Endocrine:   1. Blood sugars within goal range.       Hematology/Oncology:   1. Leukocytosis - reactive vs infectious.  Treating for possible pneumonia and trending WBC.   2. Anemia - stable. Transfuse for Hgb < 7      IV/Access:   1. Venous access - may need central line today.    2. Arterial access - Arterial line placed yesterday.  Dampened wave form. Will remove.       ICU Prophylaxis:   1. DVT: Hep Subq  2. VAP: HOB 30 degrees, chlorhexidine rinse  3. Stress Ulcer: PPI  4. Restraints: Nonviolent soft two point restraints required and necessary for patient safety and continued cares and good effect as patient continues to pull at necessary lines, tubes despite education and distraction. Will readdress daily.   5. Wound care - per unit routine   6. Feeding - NPO until I hear from surgery  7. Family Update: yes, by me  8. Disposition - critically ill.         Key goals for next 24 hours:   1. Renal consulted today.    2. Correct metabolic abnormalities  3. Potentially feed gut.                Interim History:     Yesterday with increased metabolic acidosis.  Was on BiPAP.  A line placed. Labs stable, but had increased work of breathing overnight and so was intubated. Started on lasix infusion.           Key Medications:       aspirin  81 mg Oral Daily     influenza Vac Split High-Dose  0.5 mL Intramuscular Prior to  discharge     insulin aspart  1-6 Units Subcutaneous Q4H     lidocaine (PF)  10 mL Subcutaneous Once     meropenem  500 mg Intravenous Q8H     metoprolol  5 mg Intravenous Q6H     pantoprazole (PROTONIX) IV  40 mg Intravenous Q24H     sodium chloride (PF)  10 mL Intracatheter Q8H     sodium chloride (PF)  3 mL Intracatheter Q8H     sodium chloride (PF)  3 mL Intracatheter Q8H     sodium chloride (PF)  3 mL Intracatheter Q8H     vancomycin place frausto - receiving intermittent dosing  1 each Does not apply See Admin Instructions       IV fluid REPLACEMENT ONLY       IV fluid REPLACEMENT ONLY       EPINEPHrine IV infusion ADULT Stopped (01/05/19 0747)     fentaNYL 50 mcg/hr (01/12/19 2157)     furosemide (LASIX) infusion ADULT STANDARD 5 mg/hr (01/13/19 0136)     midazolam 2 mg/hr (01/13/19 0603)     nitroGLYcerin Stopped (01/06/19 2230)     norepinephrine Stopped (01/13/19 0619)     phenylephrine IV infusion ADULT Stopped (01/07/19 0648)     propofol (DIPRIVAN) infusion Stopped (01/12/19 2125)     sodium bicarbonate 5 mL/hr (01/13/19 0743)     sodium chloride 10 mL/hr at 01/13/19 0021               Physical Examination:   Temp:  [97.5  F (36.4  C)-98.4  F (36.9  C)] 97.9  F (36.6  C)  Pulse:  [80] 80  Heart Rate:  [79-81] 80  Resp:  [8-47] 18  BP: ()/() 85/50  MAP:  [58 mmHg-95 mmHg] 66 mmHg  Arterial Line BP: ()/(47-73) 86/52  FiO2 (%):  [50 %-60 %] 60 %  SpO2:  [92 %-100 %] 98 %    Intake/Output Summary (Last 24 hours) at 1/13/2019 0836  Last data filed at 1/13/2019 0800  Gross per 24 hour   Intake 2424.93 ml   Output 1688 ml   Net 736.93 ml     Wt Readings from Last 4 Encounters:   01/13/19 92.5 kg (203 lb 14.8 oz)   09/14/08 80.7 kg (178 lb)     Arterial Line BP: ()/(47-73) 86/52  MAP:  [58 mmHg-95 mmHg] 66 mmHg  BP - Mean:  [] 61  Ventilation Mode: CMV/AC  (Continuous Mandatory Ventilation/ Assist Control)  FiO2 (%): 60 %  Rate Set (breaths/minute): 18 breaths/min  Tidal Volume Set  (mL): 500 mL  PEEP (cm H2O): 7 cmH2O  Oxygen Concentration (%): 50 %  Resp: 18    Recent Labs   Lab 01/13/19  0407 01/12/19  2135 01/12/19  1744 01/12/19  1140 01/09/19  1039  01/07/19  1235 01/07/19  0840   PH 7.44 7.39 7.41 7.36  --    < > 7.37  --    PCO2 31* 30* 23* 22*  --    < > 32*  --    PO2 110* 77* 75* 63*  --    < > 72*  --    HCO3 21 18* 15* 13*  --    < > 19*  --    O2PER  --   --   --  50 96  --  70% 80%    < > = values in this interval not displayed.       GEN: no acute distress   HEENT: head ncat, sclera anicteric, OP patent, trachea midline   PULM: decreased breath sounds on right    CV/COR: RRR S1S2 no gallop,  No rub, no murmur  ABD: soft nontender  EXT:  LE edema to thighs.  NEURO: sedated  SKIN: no obvious rash  LINES: clean, dry intact         Data:   All data and imaging reviewed     ROUTINE ICU LABS (Last four results)  CMP  Recent Labs   Lab 01/13/19  0600 01/12/19  2225 01/12/19  1727 01/12/19  1203 01/12/19  0745 01/12/19  0115 01/11/19  1046  01/11/19  0515   * 146* 141  --  137 136  --    < >  --    POTASSIUM 3.5 3.5 3.7  --  4.2 4.1 4.3   < > 4.1   CHLORIDE 112* 111* 110*  --  108 108  --    < >  --    CO2 23 20 15* 14* 16* 14*  --    < >  --    ANIONGAP 14 15* 16*  --  13 14  --    < >  --    GLC 98 101* 96  --  102* 103*  --    < >  --    BUN 61* 61* 61*  --  62* 59*  --    < >  --    CR 1.79* 1.74* 1.75*  --  1.67* 1.54*  --    < >  --    GFRESTIMATED 27* 28* 28*  --  29* 32*  --    < >  --    GFRESTBLACK 31* 32* 32*  --  34* 38*  --    < >  --    BESS 7.8* 7.3* 7.8*  --  8.1* 7.9*  --    < >  --    MAG 3.0* 3.1*  --  3.3*  --   --  2.6*  --   --    PHOS 3.6 3.9  --  3.9  --   --   --   --  3.1   PROTTOTAL 5.1* 5.2* 5.6*  --   --  5.6*  --   --   --    ALBUMIN 2.4* 2.7* 2.8*  --   --  2.9*  --   --   --    BILITOTAL 2.2* 2.3* 2.3*  --   --  1.6*  --   --   --    ALKPHOS 85 81 81  --   --  76  --   --   --    AST 33 41 54*  --   --  53*  --   --   --    ALT 26 26 30  --   --  28   --   --   --     < > = values in this interval not displayed.     CBC  Recent Labs   Lab 01/13/19  0600 01/12/19  2225 01/12/19  1727 01/12/19  0745   WBC 21.6* 19.7* 22.9* 20.9*   RBC 2.75* 2.74* 2.98* 3.08*   HGB 8.1* 8.0* 8.7* 9.1*   HCT 23.1* 22.9* 24.9* 26.3*   MCV 84 84 84 85   MCH 29.5 29.2 29.2 29.5   MCHC 35.1 34.9 34.9 34.6   RDW 15.8* 15.7* 15.6* 15.8*    176 200 199     INR  Recent Labs   Lab 01/13/19  0600 01/11/19  1046   INR 1.59* 1.28*     Arterial Blood Gas  Recent Labs   Lab 01/13/19  0407 01/12/19  2135 01/12/19  1744 01/12/19  1140 01/09/19  1039  01/07/19  1235 01/07/19  0840   PH 7.44 7.39 7.41 7.36  --    < > 7.37  --    PCO2 31* 30* 23* 22*  --    < > 32*  --    PO2 110* 77* 75* 63*  --    < > 72*  --    HCO3 21 18* 15* 13*  --    < > 19*  --    O2PER  --   --   --  50 96  --  70% 80%    < > = values in this interval not displayed.       All cultures:  Recent Labs   Lab 01/12/19  1202 01/12/19  1041 01/07/19  2240 01/06/19  1841 01/06/19  1837   CULT No growth after 12 hours No growth after 12 hours Light growth  Normal aaron   No growth No growth     Recent Results (from the past 24 hour(s))   XR Chest Port 1 View    Narrative    CHEST PORTABLE ONE VIEW 1/12/2019 9:40 AM     COMPARISON: Frontal chest x-ray 1/10/2019.    HISTORY: Postoperative, pacemaker.      Impression    IMPRESSION: There has been interval placement of a single lead  pacemaker implanted over the left chest with lead in the right  ventricle. Median sternotomy changes again noted. Elevated right  hemidiaphragm again noted. There is new patchy airspace opacity in the  central right lung that could represent atelectasis or pneumonia.  Pulmonary edema is also possible. There are no other airspace  opacities in either lung. There may be a small right pleural effusion.  There is no pleural fluid on the left. There is no pneumothorax.  Moderate cardiomegaly again noted.    ROSEMARY CORRAL MD   XR Abdomen Port 1 View     Narrative    ABDOMEN PORTABLE ONE VIEW 1/12/2019 9:40 AM     COMPARISON: None.    HISTORY: Abdominal pain.    FINDINGS: Abdominal bowel gas pattern is nonspecific. There is no  evidence for free intraperitoneal air.      Impression    IMPRESSION: No evidence for bowel obstruction or free air.    ROSEMARY CORRAL MD   XR Chest Port 1 View    Narrative    CHEST PORTABLE ONE VIEW 1/12/2019 8:40 PM     HISTORY: Confirm ETT placement.     COMPARISON: 1/12/2019.      Impression    IMPRESSION:   1. New ET tube tip projects over the mid trachea. New enteric tube  courses below the diaphragm with tip off the film. Left chest wall  pacemaker and pacer wire appear similar.  2. Worsening hazy airspace opacity at the right lung base that could  represent infiltrate, aspiration, or asymmetric pulmonary edema.  Likely small right pleural effusion. Pulmonary vasculature is  indistinct suggesting vascular congestion and this is slightly worse  since prior. No pneumothorax is visualized. Cardiac silhouette remains  enlarged.    BONITA MORIN MD         Billing: This patient is critically ill: Yes. Total critical care time today 45 min.

## 2019-01-13 NOTE — PROGRESS NOTES
ICU Staff:    Patient with increased RR despite continuous BiPAP, elevated WBC to 22 K, and infiltrate on the CXR.  Patient and daughter both agreeable with re-intubation to support the work of breathing.  Patient re-intubated without difficulties.  CTA bilaterally,  CXR demonstrates ET tube in good position.  OG placed.  Patient was hypotensive on Propofol sedation; changed to versed. Trach aspirate culture and Gram stain obtained.  Antibiotic coverage changed to Meropenem and Vanco for hospital acquired pneumonia.  ABG is appropriate.  Family updated. will follow electrolytes, lactate, and ABGs.  Will change Protonix to IV. Will add Lasix drip and replace calcium.    Ciro Hanson MD, PhD

## 2019-01-13 NOTE — PROCEDURES
Central venous catheter:  Consent obtained  Ultrasound guidance used.   Time out performed.   Skin prepped with chlorhexidine.  Patient draped  3 mL of 1% lidocaine infiltrated  seldinger technique used and wire place on 1st attempt. Wire visualized in internal jugular on ultrasound.   Skin dilated and catheter place.  Good blood return in each port.    Sutured  In place.    CXR shows line in good position. Okay to use.     Srikanth Meléndez MD

## 2019-01-13 NOTE — PLAN OF CARE
PT-Reviewed chart and noted patient is now intubated. Discussed with nurse and she requested to hold CR today.

## 2019-01-13 NOTE — PROGRESS NOTES
General Surgery  Intubation required overnight for increase wob, lactate decrease and had some more bm  Ng without much output  abd soft although similar distention  Wbc increase   Resolving ileus or intestinal ischemia  At this time it appears her intestines might be improving  OK with water/ trophic GI feeds  Following      Total encounter time 25 minutes, more than half spent in counseling and review of the data.

## 2019-01-13 NOTE — CONSULTS
BRIEF NUTRITION NOTE:    Was asked to initiate trickle TF for pt.  A full Nutrition Assessment was completed 1/12.  See note for details.    NEW FINDINGS:  Unable to place PICC line for TPN yesterday so canceled.  Pt was on bipap all day yesterday and intubated last night.    On Norepinephrine, Lasix, Precedex, Versed, NaCL at 30 mL/hr.  Na 149 (H)  Mg 3.0 (H)  BUN 61 (H)  Cr 1.79 (H) - Renal re-consulted.  Pt would benefit from a semi-elemental TF in light of recent intestinal ischemia (which is resolving per surgery).    INTERVENTIONS:  Enteral Nutrition - Initiate Peptamen 1.5 at 10 mL/hr = 360 kcals (6 kcal/kg), 16 gm pro (0.3 gm/kg), 185 mL H20, 45 gm CHO, no fober  Feeding tube flush - Ordered minimal flushes for now 30 mL every 4 hrs  Multivitamin/mineral supplement therapy - Ordered Certavite daily via FT - to meet vit/min needs while on low volume TF    RECOMMENDATIONS:  Recommend eventual goal TF Peptamen 1.5 at 50 mL/hr = 1800 kcals (29 kcal/kg), 82 gm pro (1.3 gm/kg), 924 mL H20, 226 gm CHO, no fiber.    Nazanin Todd RD, LD, [facility]

## 2019-01-13 NOTE — PROGRESS NOTES
[facility]  Cardiovascular and Thoracic Surgery Daily Note          Assessment and Plan:   POD#9 s/p Left groin cutdown with exposure of the femoral vessels with arterial cannulation for cardiopulmonary bypass. Right groin cutdown with exposure of the femoral vessels with the right femoral artery ultimately used for diagnostic angiography. 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 Visceral angiography. Repair of the bilateral femoral vessels. By Dr. Jose Winslow  -CVS: HR: 80 prev 120-130s. Underwent PPM on pod#7. SBP: 100s-110s. ASA, BB. Rapid a fib now paced,start low dose coumadin tonight. Transitioned to oral amiodarone. EP following. Vascular-Igor saw this am, following peripherally.   -Resp: Reintubated pod#8 for respiratory distress, previous bipap all day, CXR showing infiltrate RLL. US thoracentesis pod#7 with removal of 150 ml pleural fluid. Continue to encourage IS, cough, deep breathing ambulation.   -Neuro:  Grossly intact. Pain controlled.   -Renal: up 15 kg above pre-op weight. Cr: 1.79<1.67<1.44. 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 Renogram on POD#3-- atrophic right kidney. Nephrology signed off-reconsulted again today. Back on lasix gtt with good result.   -GI:  Tolerating clears. +BM. C diff on POD#4 for water stools- negative. Continue bowel regimen, AXR bowel appears edematous, made NPO, gen surg consulted, abdomen distended/hard. SMA was occluded prior to surgery. Continue to closely monitor for now. Chloride trending up, HCO3 corrected with bicarb gtt.   -:  Barrientos to remain in d/t limited mobility and need for accurate I&Os.   -Endo: pre op a1c: 5.3. Completed insulin gtt per protocol. Continue sliding scale.   -FEN: replace electrolytes as needed. Na: 149. K: 3.5  Orders Placed This Encounter      NPO for Medical/Clinical Reasons Except for: No  "Exceptions     -ID: Temp (24hrs), Av.1  F (36.7  C), Min:97.5  F (36.4  C), Max:98.4  F (36.9  C),  WBC: 21.6<20.9<15.6<12.1<9.2. Completed perioperative abx. Fevers on POD #4-- continued on cefepime, switched to meropenem and vancomycin, . Will continue to monitor. Cultures pending  -Heme: Hgb: 8.1<9.1<8.2. Plt: 189<199. Acute blood loss and thrombocytopenia related to surgery. Was given blood overnight with lasix gtt to follow.   -Proph: PCD, ASA, BB, statin, PPI, sub q heparin  -Dispo: Continue ICU cares. Appreciate Nephrology re-seeing patient, gen surg & vasc following          Interval History:   Lying in bed, intubated on versed-switch to precedex, does follow commands, no noted tenderness with palpation of abdomen          Medications:       aspirin  81 mg Oral Daily     heparin  5,000 Units Subcutaneous Q8H     influenza Vac Split High-Dose  0.5 mL Intramuscular Prior to discharge     insulin aspart  1-6 Units Subcutaneous Q4H     lidocaine (PF)  10 mL Subcutaneous Once     meropenem  500 mg Intravenous Q8H     metoprolol  5 mg Intravenous Q6H     pantoprazole (PROTONIX) IV  40 mg Intravenous Q24H     sodium chloride (PF)  10 mL Intracatheter Q8H     sodium chloride (PF)  3 mL Intracatheter Q8H     sodium chloride (PF)  3 mL Intracatheter Q8H     sodium chloride (PF)  3 mL Intracatheter Q8H     vancomycin place frausto - receiving intermittent dosing  1 each Does not apply See Admin Instructions     @[email or web address]-@          Physical Exam:   Vitals were reviewed  Blood pressure 118/71, pulse 80, temperature 97.9  F (36.6  C), temperature source Axillary, resp. rate 21, height 1.626 m (5' 4\"), weight 92.5 kg (203 lb 14.8 oz), SpO2 98 %.  Rhythm: paced    Lungs: diminished bases    Cardiovascular: s1/s2, no m/r/g    Abdomen: s/nt/nd    Extremeties: no LE edema    Incision: cdi    CT: na    Weight:   Vitals:    19 0600 19 0000 19 0600 19 06   Weight: 86 kg (189 lb 9.5 oz) 86.9 kg (191 lb " 9.3 oz) 88.7 kg (195 lb 8.8 oz) 92.3 kg (203 lb 7.8 oz)    01/13/19 0600   Weight: 92.5 kg (203 lb 14.8 oz)            Data:   Labs:   Lab Results   Component Value Date    WBC 21.6 01/13/2019     Lab Results   Component Value Date    RBC 2.75 01/13/2019     Lab Results   Component Value Date    HGB 8.1 01/13/2019     Lab Results   Component Value Date    HCT 23.1 01/13/2019     No components found for: MCT  Lab Results   Component Value Date    MCV 84 01/13/2019     Lab Results   Component Value Date    MCH 29.5 01/13/2019     Lab Results   Component Value Date    MCHC 35.1 01/13/2019     Lab Results   Component Value Date    RDW 15.8 01/13/2019     Lab Results   Component Value Date     01/13/2019       Last Basic Metabolic Panel:  Lab Results   Component Value Date     01/13/2019      Lab Results   Component Value Date    POTASSIUM 3.5 01/13/2019     Lab Results   Component Value Date    CHLORIDE 112 01/13/2019     Lab Results   Component Value Date    BESS 7.8 01/13/2019     Lab Results   Component Value Date    CO2 23 01/13/2019     Lab Results   Component Value Date    BUN 61 01/13/2019     Lab Results   Component Value Date    CR 1.79 01/13/2019     Lab Results   Component Value Date    GLC 98 01/13/2019       CXR: IMPRESSION:   1. New ET tube tip projects over the mid trachea. New enteric tube  courses below the diaphragm with tip off the film. Left chest wall  pacemaker and pacer wire appear similar.  2. Worsening hazy airspace opacity at the right lung base that could  represent infiltrate, aspiration, or asymmetric pulmonary edema.  Likely small right pleural effusion. Pulmonary vasculature is  indistinct suggesting vascular congestion and this is slightly worse  since prior. No pneumothorax is visualized. Cardiac silhouette remains  enlarged    Shania June PA-C  Pager #: [phone]

## 2019-01-14 ENCOUNTER — TELEPHONE (OUTPATIENT)
Dept: CARDIOLOGY | Facility: CLINIC | Age: 79
End: 2019-01-14

## 2019-01-14 ENCOUNTER — APPOINTMENT (OUTPATIENT)
Dept: GENERAL RADIOLOGY | Facility: CLINIC | Age: 79
DRG: 003 | End: 2019-01-14
Payer: COMMERCIAL

## 2019-01-14 ENCOUNTER — APPOINTMENT (OUTPATIENT)
Dept: ULTRASOUND IMAGING | Facility: CLINIC | Age: 79
DRG: 003 | End: 2019-01-14
Payer: COMMERCIAL

## 2019-01-14 ENCOUNTER — APPOINTMENT (OUTPATIENT)
Dept: CT IMAGING | Facility: CLINIC | Age: 79
DRG: 003 | End: 2019-01-14
Payer: COMMERCIAL

## 2019-01-14 LAB
ALBUMIN SERPL-MCNC: 2.4 G/DL (ref 3.4–5)
ALP SERPL-CCNC: 111 U/L (ref 40–150)
ALT SERPL W P-5'-P-CCNC: 28 U/L (ref 0–50)
ANION GAP SERPL CALCULATED.3IONS-SCNC: 12 MMOL/L (ref 3–14)
AST SERPL W P-5'-P-CCNC: 40 U/L (ref 0–45)
BASE EXCESS BLDV CALC-SCNC: 5.3 MMOL/L
BILIRUB DIRECT SERPL-MCNC: 1.3 MG/DL (ref 0–0.2)
BILIRUB SERPL-MCNC: 2.6 MG/DL (ref 0.2–1.3)
BUN SERPL-MCNC: 63 MG/DL (ref 7–30)
C DIFF TOX B STL QL: NEGATIVE
CA-I BLD-MCNC: 4.5 MG/DL (ref 4.4–5.2)
CALCIUM SERPL-MCNC: 8.2 MG/DL (ref 8.5–10.1)
CHLORIDE SERPL-SCNC: 110 MMOL/L (ref 94–109)
CO2 SERPL-SCNC: 26 MMOL/L (ref 20–32)
CREAT SERPL-MCNC: 1.95 MG/DL (ref 0.52–1.04)
ERYTHROCYTE [DISTWIDTH] IN BLOOD BY AUTOMATED COUNT: 15.9 % (ref 10–15)
GFR SERPL CREATININE-BSD FRML MDRD: 24 ML/MIN/{1.73_M2}
GLUCOSE BLDC GLUCOMTR-MCNC: 107 MG/DL (ref 70–99)
GLUCOSE BLDC GLUCOMTR-MCNC: 117 MG/DL (ref 70–99)
GLUCOSE BLDC GLUCOMTR-MCNC: 124 MG/DL (ref 70–99)
GLUCOSE BLDC GLUCOMTR-MCNC: 125 MG/DL (ref 70–99)
GLUCOSE BLDC GLUCOMTR-MCNC: 131 MG/DL (ref 70–99)
GLUCOSE BLDC GLUCOMTR-MCNC: 132 MG/DL (ref 70–99)
GLUCOSE SERPL-MCNC: 116 MG/DL (ref 70–99)
HCO3 BLDV-SCNC: 29 MMOL/L (ref 21–28)
HCT VFR BLD AUTO: 27.6 % (ref 35–47)
HGB BLD-MCNC: 9.7 G/DL (ref 11.7–15.7)
INR PPP: 1.58 (ref 0.86–1.14)
MAGNESIUM SERPL-MCNC: 2.7 MG/DL (ref 1.6–2.3)
MCH RBC QN AUTO: 29.8 PG (ref 26.5–33)
MCHC RBC AUTO-ENTMCNC: 35.1 G/DL (ref 31.5–36.5)
MCV RBC AUTO: 85 FL (ref 78–100)
OXYHGB MFR BLDV: 65 %
PCO2 BLDV: 39 MM HG (ref 40–50)
PH BLDV: 7.48 PH (ref 7.32–7.43)
PHOSPHATE SERPL-MCNC: 2.7 MG/DL (ref 2.5–4.5)
PLATELET # BLD AUTO: 250 10E9/L (ref 150–450)
PO2 BLDV: 33 MM HG (ref 25–47)
POTASSIUM SERPL-SCNC: 3.6 MMOL/L (ref 3.4–5.3)
POTASSIUM SERPL-SCNC: 3.8 MMOL/L (ref 3.4–5.3)
PROT SERPL-MCNC: 5.6 G/DL (ref 6.8–8.8)
RBC # BLD AUTO: 3.26 10E12/L (ref 3.8–5.2)
SODIUM SERPL-SCNC: 148 MMOL/L (ref 133–144)
SPECIMEN SOURCE: NORMAL
TRIGL SERPL-MCNC: 128 MG/DL
VANCOMYCIN SERPL-MCNC: 29.4 MG/L
WBC # BLD AUTO: 27.3 10E9/L (ref 4–11)

## 2019-01-14 PROCEDURE — 3E043XZ INTRODUCTION OF VASOPRESSOR INTO CENTRAL VEIN, PERCUTANEOUS APPROACH: ICD-10-PCS | Performed by: INTERNAL MEDICINE

## 2019-01-14 PROCEDURE — 25000132 ZZH RX MED GY IP 250 OP 250 PS 637: Performed by: INTERNAL MEDICINE

## 2019-01-14 PROCEDURE — 40000275 ZZH STATISTIC RCP TIME EA 10 MIN

## 2019-01-14 PROCEDURE — 82805 BLOOD GASES W/O2 SATURATION: CPT | Performed by: INTERNAL MEDICINE

## 2019-01-14 PROCEDURE — 84100 ASSAY OF PHOSPHORUS: CPT | Performed by: PHYSICIAN ASSISTANT

## 2019-01-14 PROCEDURE — P9047 ALBUMIN (HUMAN), 25%, 50ML: HCPCS | Performed by: NURSE PRACTITIONER

## 2019-01-14 PROCEDURE — 83735 ASSAY OF MAGNESIUM: CPT | Performed by: PHYSICIAN ASSISTANT

## 2019-01-14 PROCEDURE — 00000146 ZZHCL STATISTIC GLUCOSE BY METER IP

## 2019-01-14 PROCEDURE — 93970 EXTREMITY STUDY: CPT

## 2019-01-14 PROCEDURE — 85027 COMPLETE CBC AUTOMATED: CPT | Performed by: PHYSICIAN ASSISTANT

## 2019-01-14 PROCEDURE — 85610 PROTHROMBIN TIME: CPT | Performed by: SURGERY

## 2019-01-14 PROCEDURE — 80048 BASIC METABOLIC PNL TOTAL CA: CPT | Performed by: PHYSICIAN ASSISTANT

## 2019-01-14 PROCEDURE — 87493 C DIFF AMPLIFIED PROBE: CPT | Performed by: NURSE PRACTITIONER

## 2019-01-14 PROCEDURE — 94003 VENT MGMT INPAT SUBQ DAY: CPT

## 2019-01-14 PROCEDURE — 84478 ASSAY OF TRIGLYCERIDES: CPT | Performed by: PHYSICIAN ASSISTANT

## 2019-01-14 PROCEDURE — 71045 X-RAY EXAM CHEST 1 VIEW: CPT

## 2019-01-14 PROCEDURE — 80202 ASSAY OF VANCOMYCIN: CPT | Performed by: THORACIC SURGERY (CARDIOTHORACIC VASCULAR SURGERY)

## 2019-01-14 PROCEDURE — 25000128 H RX IP 250 OP 636: Performed by: NURSE PRACTITIONER

## 2019-01-14 PROCEDURE — 85610 PROTHROMBIN TIME: CPT | Performed by: PHYSICIAN ASSISTANT

## 2019-01-14 PROCEDURE — 25000132 ZZH RX MED GY IP 250 OP 250 PS 637

## 2019-01-14 PROCEDURE — 80076 HEPATIC FUNCTION PANEL: CPT | Performed by: PHYSICIAN ASSISTANT

## 2019-01-14 PROCEDURE — 99231 SBSQ HOSP IP/OBS SF/LOW 25: CPT | Performed by: SURGERY

## 2019-01-14 PROCEDURE — 25000128 H RX IP 250 OP 636: Performed by: SURGERY

## 2019-01-14 PROCEDURE — 99291 CRITICAL CARE FIRST HOUR: CPT | Performed by: NURSE PRACTITIONER

## 2019-01-14 PROCEDURE — 74176 CT ABD & PELVIS W/O CONTRAST: CPT

## 2019-01-14 PROCEDURE — 20000003 ZZH R&B ICU

## 2019-01-14 PROCEDURE — 25000132 ZZH RX MED GY IP 250 OP 250 PS 637: Performed by: STUDENT IN AN ORGANIZED HEALTH CARE EDUCATION/TRAINING PROGRAM

## 2019-01-14 PROCEDURE — 25000125 ZZHC RX 250

## 2019-01-14 PROCEDURE — 25000128 H RX IP 250 OP 636

## 2019-01-14 PROCEDURE — 25000128 H RX IP 250 OP 636: Performed by: THORACIC SURGERY (CARDIOTHORACIC VASCULAR SURGERY)

## 2019-01-14 PROCEDURE — 82330 ASSAY OF CALCIUM: CPT | Performed by: PHYSICIAN ASSISTANT

## 2019-01-14 PROCEDURE — 25000128 H RX IP 250 OP 636: Performed by: STUDENT IN AN ORGANIZED HEALTH CARE EDUCATION/TRAINING PROGRAM

## 2019-01-14 PROCEDURE — 84132 ASSAY OF SERUM POTASSIUM: CPT

## 2019-01-14 PROCEDURE — 25000125 ZZHC RX 250: Performed by: SURGERY

## 2019-01-14 PROCEDURE — 25000132 ZZH RX MED GY IP 250 OP 250 PS 637: Performed by: THORACIC SURGERY (CARDIOTHORACIC VASCULAR SURGERY)

## 2019-01-14 RX ORDER — WARFARIN SODIUM 2.5 MG/1
2.5 TABLET ORAL
Status: DISCONTINUED | OUTPATIENT
Start: 2019-01-14 | End: 2019-01-14

## 2019-01-14 RX ORDER — CHLORHEXIDINE GLUCONATE ORAL RINSE 1.2 MG/ML
15 SOLUTION DENTAL 2 TIMES DAILY
Status: DISCONTINUED | OUTPATIENT
Start: 2019-01-14 | End: 2019-01-18

## 2019-01-14 RX ORDER — ALBUMIN (HUMAN) 12.5 G/50ML
25 SOLUTION INTRAVENOUS ONCE
Status: COMPLETED | OUTPATIENT
Start: 2019-01-14 | End: 2019-01-14

## 2019-01-14 RX ORDER — ALBUMIN (HUMAN) 12.5 G/50ML
25 SOLUTION INTRAVENOUS EVERY 8 HOURS
Status: COMPLETED | OUTPATIENT
Start: 2019-01-14 | End: 2019-01-15

## 2019-01-14 RX ADMIN — ASPIRIN 81 MG 81 MG: 81 TABLET ORAL at 10:12

## 2019-01-14 RX ADMIN — HEPARIN SODIUM 5000 UNITS: 10000 INJECTION, SOLUTION INTRAVENOUS; SUBCUTANEOUS at 20:51

## 2019-01-14 RX ADMIN — ALBUMIN HUMAN 25 G: 0.25 SOLUTION INTRAVENOUS at 17:46

## 2019-01-14 RX ADMIN — MEROPENEM 500 MG: 500 INJECTION, POWDER, FOR SOLUTION INTRAVENOUS at 00:53

## 2019-01-14 RX ADMIN — CHLORHEXIDINE GLUCONATE 15 ML: 1.2 RINSE ORAL at 13:19

## 2019-01-14 RX ADMIN — NYSTATIN 500000 UNITS: 500000 SUSPENSION ORAL at 08:35

## 2019-01-14 RX ADMIN — MULTIVITAMIN 15 ML: LIQUID ORAL at 08:38

## 2019-01-14 RX ADMIN — MEROPENEM 500 MG: 500 INJECTION, POWDER, FOR SOLUTION INTRAVENOUS at 16:05

## 2019-01-14 RX ADMIN — HEPARIN SODIUM 5000 UNITS: 10000 INJECTION, SOLUTION INTRAVENOUS; SUBCUTANEOUS at 01:39

## 2019-01-14 RX ADMIN — Medication 50 MCG/HR: at 01:32

## 2019-01-14 RX ADMIN — NOREPINEPHRINE BITARTRATE 0.1 MCG/KG/MIN: 1 INJECTION INTRAVENOUS at 14:34

## 2019-01-14 RX ADMIN — POTASSIUM CHLORIDE 20 MEQ: 400 INJECTION, SOLUTION INTRAVENOUS at 05:42

## 2019-01-14 RX ADMIN — NYSTATIN 500000 UNITS: 500000 SUSPENSION ORAL at 17:46

## 2019-01-14 RX ADMIN — MEROPENEM 500 MG: 500 INJECTION, POWDER, FOR SOLUTION INTRAVENOUS at 08:34

## 2019-01-14 RX ADMIN — ALBUMIN HUMAN 25 G: 0.25 SOLUTION INTRAVENOUS at 10:12

## 2019-01-14 RX ADMIN — HEPARIN SODIUM 5000 UNITS: 10000 INJECTION, SOLUTION INTRAVENOUS; SUBCUTANEOUS at 12:58

## 2019-01-14 RX ADMIN — DEXMEDETOMIDINE HYDROCHLORIDE 0.3 MCG/KG/HR: 100 INJECTION, SOLUTION INTRAVENOUS at 00:49

## 2019-01-14 RX ADMIN — NYSTATIN 500000 UNITS: 500000 SUSPENSION ORAL at 00:52

## 2019-01-14 RX ADMIN — POTASSIUM CHLORIDE 20 MEQ: 400 INJECTION, SOLUTION INTRAVENOUS at 01:29

## 2019-01-14 RX ADMIN — CHLORHEXIDINE GLUCONATE 15 ML: 1.2 RINSE ORAL at 20:52

## 2019-01-14 RX ADMIN — DEXMEDETOMIDINE HYDROCHLORIDE 0.3 MCG/KG/HR: 100 INJECTION, SOLUTION INTRAVENOUS at 17:01

## 2019-01-14 RX ADMIN — NYSTATIN 500000 UNITS: 500000 SUSPENSION ORAL at 12:20

## 2019-01-14 RX ADMIN — ACETAMINOPHEN 650 MG: 325 TABLET, FILM COATED ORAL at 17:54

## 2019-01-14 ASSESSMENT — ACTIVITIES OF DAILY LIVING (ADL)
ADLS_ACUITY_SCORE: 17

## 2019-01-14 ASSESSMENT — MIFFLIN-ST. JEOR: SCORE: 1390

## 2019-01-14 NOTE — PLAN OF CARE
Pt arouses to verbal stimuli and follows commands at times. VERDIN weakly. U/O adequate. Lasix drip stopped this morning as ordered. No vent weaning this shift. Minimal ETT secretions. Stool sample sent to lab to rule out C diff. Pt's daughter updated.

## 2019-01-14 NOTE — PROGRESS NOTES
[facility]  Cardiovascular and Thoracic Surgery Daily Note          Assessment and Plan:   POD#10 s/p Left groin cutdown with exposure of the femoral vessels with arterial cannulation for cardiopulmonary bypass. Right groin cutdown with exposure of the femoral vessels with the right femoral artery ultimately used for diagnostic angiography. 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 Visceral angiography. Repair of the bilateral femoral vessels. By Dr. Jose Winslow  -CVS: HR: 80 prev 120-130s. Underwent PPM on pod#7. SBP: 100s-110s. ASA, BB. Rapid a fib now paced, low dose coumadin tonight. Transitioned to oral amiodarone. EP following. Vascular-Igor saw this am, following peripherally.   -Resp: Reintubated pod#8 for respiratory distress, previous bipap all day, CXR showing infiltrate RLL. US thoracentesis pod#7 with removal of 150 ml pleural fluid. Continue to encourage IS, cough, deep breathing ambulation.   -Neuro:  Grossly intact. Pain controlled.   -Renal: up 14 kg above pre-op weight. Cr: 1.95<1.79<1.67<1.44. 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 Renogram on POD#3-- atrophic right kidney. Nephrology signed off-reconsulted again. Lasix gtt.   -GI:  Tolerating clears. +BM. C diff on POD#4 for water stools- negative, repeat this am. Continue bowel regimen, AXR bowel appears improved prev edematous. SMA was occluded prior to surgery. Continue to closely monitor for now.   -:  Barrientos to remain in d/t limited mobility and need for accurate I&Os.   -Endo: pre op a1c: 5.3. Completed insulin gtt per protocol. Continue sliding scale.   -FEN: replace electrolytes as needed. Na: 148. K: 3.8  Orders Placed This Encounter      NPO for Medical/Clinical Reasons Except for: No Exceptions     -ID: Temp (24hrs), Av.2  F (36.2  C), Min:96.3  F (35.7  C), Max:98.8  F (37.1  " C),  WBC: 27.3<21.6<20.9<15.6<12.1<9.2. C diff pending, Completed perioperative abx. Fevers on POD #4-- continued on cefepime, switched to meropenem and vancomycin, . Will continue to monitor. Cultures pending  -Heme: Hgb: 9.7<8.1<9.1<8.2. Plt: 250. Acute blood loss and thrombocytopenia related to surgery. Was given blood overnight with lasix gtt to follow.   -Proph: PCD, ASA, BB, statin, PPI, sub q heparin  -Dispo: Continue ICU cares. Appreciate Nephrology re-seeing patient, gen surg & vasc following          Interval History:   Lying in bed, appears comfortable, good UO, rising WBC-D diff sent         Medications:       aspirin  81 mg Per Feeding Tube Daily     heparin  5,000 Units Subcutaneous Q8H     influenza Vac Split High-Dose  0.5 mL Intramuscular Prior to discharge     insulin aspart  1-6 Units Subcutaneous Q4H     lidocaine (PF)  10 mL Subcutaneous Once     meropenem  500 mg Intravenous Q8H     metoprolol  2.5 mg Intravenous Q6H     multivitamins w/minerals  15 mL Per Feeding Tube Daily     nystatin  500,000 Units Oral 4x Daily     pantoprazole (PROTONIX) IV  40 mg Intravenous Q24H     sodium chloride (PF)  10 mL Intracatheter Q8H     sodium chloride (PF)  3 mL Intracatheter Q8H     sodium chloride (PF)  3 mL Intracatheter Q8H     sodium chloride (PF)  3 mL Intracatheter Q8H     vancomycin place frausto - receiving intermittent dosing  1 each Does not apply See Admin Instructions     warfarin  2.5 mg Oral ONCE at 18:00     @MEDSPRN-@          Physical Exam:   Vitals were reviewed  Blood pressure (!) 89/53, pulse 80, temperature 98.8  F (37.1  C), resp. rate 9, height 1.626 m (5' 4\"), weight 91.5 kg (201 lb 11.5 oz), SpO2 (!) 77 %.  Rhythm: paced    Lungs: diminished bases    Cardiovascular: s1/s2, no m/r/g    Abdomen: s/nt/nd    Extremeties: no LE edema    Incision: cdi    CT: na    Weight:   Vitals:    01/08/19 0000 01/09/19 0600 01/12/19 0600 01/13/19 0600   Weight: 86.9 kg (191 lb 9.3 oz) 88.7 kg (195 " lb 8.8 oz) 92.3 kg (203 lb 7.8 oz) 92.5 kg (203 lb 14.8 oz)    01/14/19 0500   Weight: 91.5 kg (201 lb 11.5 oz)            Data:   Labs:   Lab Results   Component Value Date    WBC 27.3 01/14/2019     Lab Results   Component Value Date    RBC 3.26 01/14/2019     Lab Results   Component Value Date    HGB 9.7 01/14/2019     Lab Results   Component Value Date    HCT 27.6 01/14/2019     No components found for: MCT  Lab Results   Component Value Date    MCV 85 01/14/2019     Lab Results   Component Value Date    MCH 29.8 01/14/2019     Lab Results   Component Value Date    MCHC 35.1 01/14/2019     Lab Results   Component Value Date    RDW 15.9 01/14/2019     Lab Results   Component Value Date     01/14/2019       Last Basic Metabolic Panel:  Lab Results   Component Value Date     01/14/2019      Lab Results   Component Value Date    POTASSIUM 3.8 01/14/2019     Lab Results   Component Value Date    CHLORIDE 110 01/14/2019     Lab Results   Component Value Date    BESS 8.2 01/14/2019     Lab Results   Component Value Date    CO2 26 01/14/2019     Lab Results   Component Value Date    BUN 63 01/14/2019     Lab Results   Component Value Date    CR 1.95 01/14/2019     Lab Results   Component Value Date     01/14/2019       CXR: IMPRESSION: Right lower lung opacities have increased. Right upper  lung opacities have improved. Left upper lung opacities are unchanged.  Tubes and lines are unchanged. Postoperative mediastinal change noted.  Single lead cardiac pacing device is unchanged.    Shania June PA-C  Pager #: [phone]

## 2019-01-14 NOTE — PROGRESS NOTES
" Renal Medicine Progress Note    SHORTHAND KEY FOR MY NOTES:  c = with, s = without, p = after, a = before, x = except, asx = asymptomatic, tx = transplant or treatment, sx = symptoms or symptomatic, cx = canceled or culture, rxn = reaction, yday = yesterday, nl = normal, abx = antibiotics, fxn = function, dx = diagnosis, dz = disease, m/h = melena/hematochezia, c/d/l/ha = cramping/dizziness/lightheadedness/headache, d/c = discharge or diarrhea/constipation, f/c/n/v = fevers/chills/nausea/vomiting, cp/sob = chest pain/shortness of breath.         Assessment/Plan:     1.  Non-oliguric IMAN/CKD.  Pt responded very well to the diuretics - so much so that she dropped her BP.  She is TBV up from hypoalbuminemic third-spacing, but intravascularly seems dry even though she's \"up\" in wt since admission.  Her labs are hemoconcentrated.  A.  Stop diuretics for now.  Will need some back, at some point.  B.  Ok for some IV alb.  C.  Follow labs, uo.  D.  Avoid further nephrotoxics.    2.  S/p aortic dissection/repair.  Stable.  A.  Per CV Surg.    3.  Resp failure/possible pneumonia.  Pt is on the vent. CXR c a RLL opacity - pneumonia?  She is on broad abx.  WBC is higher today, but seems related to hemoconcentration.  A.  Weaning per ICU.    4.  FEN.   Na is a bit better - getting some free water.  A.  Follow Na.  B.  Continue free water.        Interval History:     Unable.  Pt is intubated/sedated.          Medications and Allergies:       albumin human  25 g Intravenous Q8H     aspirin  81 mg Per Feeding Tube Daily     heparin  5,000 Units Subcutaneous Q8H     influenza Vac Split High-Dose  0.5 mL Intramuscular Prior to discharge     insulin aspart  1-6 Units Subcutaneous Q4H     lidocaine (PF)  10 mL Subcutaneous Once     meropenem  500 mg Intravenous Q8H     metoprolol  2.5 mg Intravenous Q6H     multivitamins w/minerals  15 mL Per Feeding Tube Daily     nystatin  500,000 Units Oral 4x Daily     pantoprazole (PROTONIX) IV  " "40 mg Intravenous Q24H     sodium chloride (PF)  10 mL Intracatheter Q8H     sodium chloride (PF)  3 mL Intracatheter Q8H     sodium chloride (PF)  3 mL Intracatheter Q8H     sodium chloride (PF)  3 mL Intracatheter Q8H     vancomycin place frausto - receiving intermittent dosing  1 each Does not apply See Admin Instructions     warfarin  2.5 mg Oral ONCE at 18:00     Allergies   Allergen Reactions     Amoxicillin Swelling     Ciprofloxacin Swelling          Physical Exam:     Vitals were reviewed  Heart Rate: 82, Blood pressure (!) 89/53, pulse 80, temperature 98.8  F (37.1  C), resp. rate 9, height 1.626 m (5' 4\"), weight 91.5 kg (201 lb 11.5 oz), SpO2 (!) 77 %.  Wt Readings from Last 3 Encounters:   01/14/19 91.5 kg (201 lb 11.5 oz)   09/14/08 80.7 kg (178 lb)     Intake/Output Summary (Last 24 hours) at 1/14/2019 1237  Last data filed at 1/14/2019 1030  Gross per 24 hour   Intake 2371.52 ml   Output 4265 ml   Net -1893.48 ml     GENERAL APPEARANCE: intubated, sedated  HEENT:  Eyes/ears/nose/neck grossly normal x +OG  RESP: + vent sounds, lungs fairly cta b, no obv crackles  CV: RRR, nl S1/S2, + pacer   ABDOMEN: o/s/nt/nd  EXTREMITIES/SKIN: 1+ edema  NEURO:  sedated  LINES:  + coello c yellow urine; + RIJ 3-lumen         Data:     CBC RESULTS:     Recent Labs   Lab 01/14/19  0406 01/13/19  0600 01/12/19  2225 01/12/19  1727 01/12/19  0745 01/12/19  0115   WBC 27.3* 21.6* 19.7* 22.9* 20.9* 15.7*   RBC 3.26* 2.75* 2.74* 2.98* 3.08* 2.42*   HGB 9.7* 8.1* 8.0* 8.7* 9.1* 7.0*   HCT 27.6* 23.1* 22.9* 24.9* 26.3* 20.5*    189 176 200 199 171       Basic Metabolic Panel:  Recent Labs   Lab 01/14/19  0406 01/14/19  0008 01/13/19  1950 01/13/19  0600 01/12/19  2225 01/12/19  1727 01/12/19  1203 01/12/19  0745   *  --  150* 149* 146* 141  --  137   POTASSIUM 3.8 3.6 3.1* 3.5 3.5 3.7  --  4.2   CHLORIDE 110*  --  111* 112* 111* 110*  --  108   CO2 26  --  28 23 20 15* 14* 16*   BUN 63*  --  63* 61* 61* 61*  --  " 62*   CR 1.95*  --  1.84* 1.79* 1.74* 1.75*  --  1.67*   *  --  111* 98 101* 96  --  102*   BESS 8.2*  --  8.0* 7.8* 7.3* 7.8*  --  8.1*     INR  Recent Labs   Lab 01/14/19  0406 01/13/19  0600 01/11/19  1046   INR 1.58* 1.59* 1.28*      Attestation:   I have reviewed today's relevant vital signs, notes, medications, labs and imaging.    Osman Madrid MD  [facility] - Nephrology  [phone]

## 2019-01-14 NOTE — CONSULTS
Full consult dictated.  Suspected Ischemic colitis.  CT scan abd. If negative than plan for flex sig/colonoscopy tomorrow.    Full consult dictated    [facility]

## 2019-01-14 NOTE — PLAN OF CARE
CR/PT: Per notes pt intubated late 1/12 secondary to significant metabolic acidosis, increased work of breathing. RN notes pt is following some commands intermittently, pt with bedside procedure at PT attempt.

## 2019-01-14 NOTE — PROGRESS NOTES
[facility] ICU RESPIRATORY NOTE  Date of Admission: 1/4/2019  Date of Intubation (most recent):1/12/2019  Reason for Mechanical Ventilation: Resp failure  Number of Days on Mechanical Ventilation:2  Met Criteria for Pressure Support Trial:  Yes  Reason for No Pressure Support Trial:Per MD    ABG Results:  Recent Labs   Lab 01/13/19  0407 01/12/19  2135 01/12/19  1744 01/12/19  1140 01/09/19  1039  01/07/19  1235 01/07/19  0840   PH 7.44 7.39 7.41 7.36  --    < > 7.37  --    PCO2 31* 30* 23* 22*  --    < > 32*  --    PO2 110* 77* 75* 63*  --    < > 72*  --    HCO3 21 18* 15* 13*  --    < > 19*  --    O2PER  --   --   --  50 96  --  70% 80%    < > = values in this interval not displayed.       Ventilation Mode: CMV/AC  (Continuous Mandatory Ventilation/ Assist Control)  FiO2 (%): 4 %  Rate Set (breaths/minute): 18 breaths/min  Tidal Volume Set (mL): 500 mL  PEEP (cm H2O): 7 cmH2O  Oxygen Concentration (%): 40 %  Resp: 21      ETT appearance on chest x-ray: ETT is in good position.      Plan:  Continue full ventilator support.  1/13/2019  Mat Weaver

## 2019-01-14 NOTE — PROGRESS NOTES
[facility]  Critical Care Service  Progress Note  Date of Service (when I saw the patient): 01/14/2019  Main Plans for Today    Stop Lasix drip   25gm 25% albumin   Check c.diff   Increase free water   Consider CT CAP   GI consult to assess for colitis   US lower extremities     Assessment & Plan    Priya Funez is a 78 year old female with PMH of HTN admitted on 1/4/2019 with sudden onset of back pain found to have type A aortic dissection and sent for emergency aortic dissection repair with graft.  She was admitted to ICU for ongoing care.     1/5: did well overnight.  Epi weaned off; requiring low dose phenylephrine.  Hgb stable. Extubated in the afternoon.   1/6:poor inspiratory effort and weak cough through the day.  Placed on Bipap overnight.  NTG gtt for improved BP control.  Rigors and fever to 103  1/7: Fever resolved; stable respiratory status on HFNC.  SvO2 48; getting echocardiogram.  Normal cardiac output; EF 55-60%   1/8: Rapid A Fib  1/9: Titrating down FiO2; improved ABG and VBG   1/11-12, hypoxic requiring bipap with little reserve, low UO, fluid resuscitated  with albumin blood and lasix  1/13: re-intubated late on 1/12; significant metabolic acidosis, increased work of breathing      Neuro  1. Sedation   2. ICU delirium  3. Acute pain  Plan:  --Fentanyl drip and PRN acetaminophen  --Precedex as needed for sedation   -- Maintain circadian rhythm.  Lights on during the day.  Off at night, minimize cares at night.  OOB during the day.      CV  1. S/p Type A aortic dissection repair 1/4 with Davis Little, and Michaela  2. HTN  3. Low SvO2/ venous oxyhemoglobin of 46  4. Afib with RVR, uncontrolled with medications now s/p AV node ablation and pacemaker placement 1/11/19  5. Shock; unclear source hypovolemic vs septic; normal lactic acid but increasing leukocytosis and ongoing hypotension requiring vasopressor   Plan:  --CV Surgery and Vascular surgery primary   --Monitor  hemodynamics.  Maintain SBP<130; MAP >65  --Noepi as needed to maintain SBP>65  --Cardiac meds per CV surgery, metoprolol held secondary to vasopressor   --Echocardiogram 1/7; EF 50-55%; IVC not dilated, ventricles small      Resp:  1. Post operative ventilator management; resolved   2. Acute hypoxemic /hypercapnic respiratory failure.    Plan:  -- Initially tolerated HFO2 then bipap then re-intubated 1/12 secondary to increased WOB and SBO.  Only mild hypoxia on ABG at time of re-intubation   --albuterol nebs PRN  -- Daily CXR to monitor infiltrate/effusions       GI/Nutrition  1. Ileus  2. Malnutrition, nia/pro deficits  3. Ongoing diarrhea   Plan:  --Repeat c.diff due to ongoing leukocytosis; liquid stool    --Liquid stool slowing, abdomin distended, abd flat plate shows edematous bowel pattern.  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  -- General surgery consult to follow with us.  Gastroenterology consulted to evaluate for colitis   -- Continue tube feeds      Renal  1. IMAN; abdominal aortic dissection involving right renal artery.  Atrophic right kidney with decreased flow. No prior hx.  Baseline creatinine appears to be 0.8;   2. Hypophosphatemia   3. Metabolic acidosis; resolved   4. Hypernatremia  Plan:  --Nephrology consulted, now signed off. Renogram 1/7.  Renal US 1/6 with flow present in both R/L renal arteries. Right kidney atrophic.    -- Lasix drip stopped this AM; patient requiring vasopressors due to hypotension following >24 hours of diuretic drip  -- Unclear picture of volume status; suspect patient is intravascularly dry. Will schedule albumin x 3 doses   -- Metabolic acidosis resolved.  Lactic acid normal    --Increase enteric free water      ID  1. Fevers (improved)  2. Leukocytosis (worsening)  Plan:  --andriy-op Cefepime and Vancomycin. Vancomycin complete.   -- Cefepime has been continued in setting of leukocytosis; completed 6 days.   --broadened to empiric vanco  and millie on 1/12 for hypotension and worsening leukocytosis   -- Blood and sputum cultures pending. Urinalysis without concern for UTI; did not reflex to culture     Endocrine  1. Stress Hyperglycemia; resolved   Plan:  --  BG check Q 4 hours. Medium resistance sliding scale insulin. Has not been requiring       Heme:  1. Acute blood loss anemia  2. Thrombocytopenia; improving, likely consumptive  3. Superficial thrombophlebitis in the right basilic vein   Plan:  -- Monitor hemoglobin. Transfuse to keep > 7.0  --Systemic anticoagulation not indicated for superficial thrombus   --US lower extremities to assess for DVT      MSK/Skin  1. Sternotomy   Plan:  -- Sternal precautions  -- Wound care per protocol      General cares:  DVT Prophylaxis: continue PCD's; subcutaneous heparin   GI Prophylaxis: PPI  Restraints: Restraints for medical healing needed: YES   Family update by me today: Yes   Current lines are required for patient management  Access: JEANNE Farr  Time Spent on this Encounter   Billing:  I spent 60 minutes bedside and on the inpatient unit today managing the critical care of Priya Funez in relation to the issues listed in this note.    Interval History    Calm and sedated overnight.  Still requiring levophed at unchanged dose.  Should be able to titrate down today. Rechecking c. Diff in setting of ongoing leukocytosis.  Holding lasix drip.  Family at bedside and updated of plan.     Physical Exam   Temp: 98.8  F (37.1  C) Temp src: Esophageal Temp  Min: 96.3  F (35.7  C)  Max: 98.8  F (37.1  C) BP: (!) 89/53 Pulse: 80 Heart Rate: 82 Resp: 9 SpO2: (!) 77 % O2 Device: Mechanical Ventilator    Vitals:    01/12/19 0600 01/13/19 0600 01/14/19 0500   Weight: 92.3 kg (203 lb 7.8 oz) 92.5 kg (203 lb 14.8 oz) 91.5 kg (201 lb 11.5 oz)     I/O last 3 completed shifts:  In: 2456.62 [I.V.:1876.62; NG/GT:430]  Out: 4530 [Urine:4505; Stool:25]    GEN: intubated, sedated, NAD   EYES: PERRL, Anicteric  sclera.   HEENT:  Normocephalic, atraumatic, trachea midline, ETT secure   CV: RRR, 100% V-paced, no gallops, rubs, or murmurs  PULM/CHEST: Clear anterior breath sounds bilaterally without rhonchi, crackles or wheeze, symmetric chest rise  GI: distended, soft    : coello catheter in place, urine yellow and clear  EXTREMITIES: ++ peripheral edema on right, 1+ on left, moving all extremities, peripheral pulses intact  NEURO: sedated   SKIN: warm and dry  Imaging personally reviewed:  Bilateral effusions.   EC% V-paced     Data   Recent Labs   Lab 19  0406 19  0008 19  1950 19  0600 19  2225  19  1046   WBC 27.3*  --   --  21.6* 19.7*   < >  --    HGB 9.7*  --   --  8.1* 8.0*   < >  --    MCV 85  --   --  84 84   < >  --      --   --  189 176   < >  --    INR 1.58*  --   --  1.59*  --   --  1.28*   *  --  150* 149* 146*   < >  --    POTASSIUM 3.8 3.6 3.1* 3.5 3.5   < > 4.3   CHLORIDE 110*  --  111* 112* 111*   < >  --    CO2 26  --  28 23 20   < >  --    BUN 63*  --  63* 61* 61*   < >  --    CR 1.95*  --  1.84* 1.79* 1.74*   < >  --    ANIONGAP 12  --  11 14 15*   < >  --    BESS 8.2*  --  8.0* 7.8* 7.3*   < >  --    *  --  111* 98 101*   < >  --    ALBUMIN 2.4*  --   --  2.4* 2.7*   < >  --    PROTTOTAL 5.6*  --   --  5.1* 5.2*   < >  --    BILITOTAL 2.6*  --   --  2.2* 2.3*   < >  --    ALKPHOS 111  --   --  85 81   < >  --    ALT 28  --   --  26 26   < >  --    AST 40  --   --  33 41   < >  --     < > = values in this interval not displayed.

## 2019-01-14 NOTE — PLAN OF CARE
Neuro: following commands, on precedex and fent drip  CV: V-paced 100% at 80, BP stable on levophed drip  Pulm: tolerating vent settings. Clear LS  GI/: trickle feed continued with 100ml water flush every 4 hour. Rectal tube inplace patent. Barrientos with good response to IV lasix infusion  Skin: midline incision DARREL. B/l groin cut down site CDI. Generalized edema 2+ to 3+.  Lines: Triple Lumen Catheter RIJ. PIV X1  Restraints: Continued to maintain patency of necessary lines and ET tube.   Plan: Sedation vacation and PST today as tolerated.

## 2019-01-14 NOTE — PROGRESS NOTES
Vascular Surgery Progress Note    S:  On levophed. WBC continues to rise. Otherwise, NAEO. Tolerating trophic feeds.     O:  Physical exam  General: sedated and intubated, slightly awakens to voice  B/P: 89/53, T: 98.8, P: 80, R: 9  CV: RRR  Resp: mechanically ventilated  Abd: stable exam, Soft, non-tender, mild distended. No rigidity, no guarding  Extremities: warm feet bilaterally      Intake/Output Summary (Last 24 hours) at 1/14/2019 0959  Last data filed at 1/14/2019 0800  Gross per 24 hour   Intake 2436.3 ml   Output 4215 ml   Net -1778.7 ml     Labs - WBC 27.3 <--21.6, 19.7; INR 1.58, Cr 1.95    A/P:  Priya DAMON Armin is a 78 year old female with complex Type A dissection, POD#9 s/p repair     - abdominal exam remains stable. Tolerated trophic feeds and stooling through rectal tube.  - leucocytosis continues to rise but remains afebrile. C diff was negative. Continue abx.   - will continue to follow along.     Yuri Springer MD  General Surgery PGY4  [phone]

## 2019-01-14 NOTE — PLAN OF CARE
Neuro: following commands, on precedex and fent drip  CV: Vpaced at 80, BP soft on levophed drip  Pulm: tolerating vent settings. Thin scant secretions. Crackles Rt base now improving. Coarse to clear LS  GI/: trickle feed started with 100ml water flush every 4 hour. Rectal tube inplace patent. Barrientos with good response to IV lasix infusion  Skin: midline incision DARREL. B/l groin cut down site CDI. Generalized edema 2+ to 3+.  Lines: TLC RIJ. PIV X1  Restraints: Continued to maintain patency of necessary lines and ET tube.   Plan: Rest overnight on vent. Family updated about POC. Sedation vacation and PST tomorrow as tolerated.

## 2019-01-14 NOTE — PROGRESS NOTES
General surgery  Intubated  Non responsive  Af vss  Tolerating feeds  abd soft, no appreciable tenderness  Brown stools    Little evidence for intestinal ischemia  Will follow

## 2019-01-15 ENCOUNTER — RESULTS ONLY (OUTPATIENT)
Dept: ENDOSCOPY | Facility: CLINIC | Age: 79
End: 2019-01-15

## 2019-01-15 LAB
ANION GAP SERPL CALCULATED.3IONS-SCNC: 9 MMOL/L (ref 3–14)
BACTERIA SPEC CULT: ABNORMAL
BUN SERPL-MCNC: 59 MG/DL (ref 7–30)
CALCIUM SERPL-MCNC: 8.4 MG/DL (ref 8.5–10.1)
CHLORIDE SERPL-SCNC: 110 MMOL/L (ref 94–109)
CO2 SERPL-SCNC: 30 MMOL/L (ref 20–32)
CREAT SERPL-MCNC: 1.95 MG/DL (ref 0.52–1.04)
ERYTHROCYTE [DISTWIDTH] IN BLOOD BY AUTOMATED COUNT: 16.2 % (ref 10–15)
FLEXIBLE SIGMOIDOSCOPY: NORMAL
GFR SERPL CREATININE-BSD FRML MDRD: 24 ML/MIN/{1.73_M2}
GLUCOSE BLDC GLUCOMTR-MCNC: 109 MG/DL (ref 70–99)
GLUCOSE BLDC GLUCOMTR-MCNC: 114 MG/DL (ref 70–99)
GLUCOSE BLDC GLUCOMTR-MCNC: 120 MG/DL (ref 70–99)
GLUCOSE BLDC GLUCOMTR-MCNC: 142 MG/DL (ref 70–99)
GLUCOSE BLDC GLUCOMTR-MCNC: 97 MG/DL (ref 70–99)
GLUCOSE SERPL-MCNC: 115 MG/DL (ref 70–99)
HCT VFR BLD AUTO: 24.1 % (ref 35–47)
HGB BLD-MCNC: 8.3 G/DL (ref 11.7–15.7)
INR PPP: 1.89 (ref 0.86–1.14)
MCH RBC QN AUTO: 29.6 PG (ref 26.5–33)
MCHC RBC AUTO-ENTMCNC: 34.4 G/DL (ref 31.5–36.5)
MCV RBC AUTO: 86 FL (ref 78–100)
PLATELET # BLD AUTO: 237 10E9/L (ref 150–450)
POTASSIUM SERPL-SCNC: 3.1 MMOL/L (ref 3.4–5.3)
POTASSIUM SERPL-SCNC: 3.6 MMOL/L (ref 3.4–5.3)
RBC # BLD AUTO: 2.8 10E12/L (ref 3.8–5.2)
SODIUM SERPL-SCNC: 149 MMOL/L (ref 133–144)
SPECIMEN SOURCE: ABNORMAL
VANCOMYCIN SERPL-MCNC: 22.9 MG/L
VANCOMYCIN SERPL-MCNC: 28 MG/L
WBC # BLD AUTO: 16.7 10E9/L (ref 4–11)

## 2019-01-15 PROCEDURE — 80202 ASSAY OF VANCOMYCIN: CPT | Performed by: THORACIC SURGERY (CARDIOTHORACIC VASCULAR SURGERY)

## 2019-01-15 PROCEDURE — 88305 TISSUE EXAM BY PATHOLOGIST: CPT | Performed by: INTERNAL MEDICINE

## 2019-01-15 PROCEDURE — 94640 AIRWAY INHALATION TREATMENT: CPT | Mod: 76

## 2019-01-15 PROCEDURE — 40000275 ZZH STATISTIC RCP TIME EA 10 MIN

## 2019-01-15 PROCEDURE — 25000132 ZZH RX MED GY IP 250 OP 250 PS 637: Performed by: INTERNAL MEDICINE

## 2019-01-15 PROCEDURE — 85027 COMPLETE CBC AUTOMATED: CPT | Performed by: THORACIC SURGERY (CARDIOTHORACIC VASCULAR SURGERY)

## 2019-01-15 PROCEDURE — 25000125 ZZHC RX 250

## 2019-01-15 PROCEDURE — 25000128 H RX IP 250 OP 636: Performed by: THORACIC SURGERY (CARDIOTHORACIC VASCULAR SURGERY)

## 2019-01-15 PROCEDURE — 25000132 ZZH RX MED GY IP 250 OP 250 PS 637

## 2019-01-15 PROCEDURE — 45331 SIGMOIDOSCOPY AND BIOPSY: CPT | Performed by: INTERNAL MEDICINE

## 2019-01-15 PROCEDURE — 88305 TISSUE EXAM BY PATHOLOGIST: CPT | Mod: 26 | Performed by: INTERNAL MEDICINE

## 2019-01-15 PROCEDURE — 25000128 H RX IP 250 OP 636

## 2019-01-15 PROCEDURE — 80048 BASIC METABOLIC PNL TOTAL CA: CPT | Performed by: THORACIC SURGERY (CARDIOTHORACIC VASCULAR SURGERY)

## 2019-01-15 PROCEDURE — 85610 PROTHROMBIN TIME: CPT | Performed by: THORACIC SURGERY (CARDIOTHORACIC VASCULAR SURGERY)

## 2019-01-15 PROCEDURE — 94640 AIRWAY INHALATION TREATMENT: CPT

## 2019-01-15 PROCEDURE — 94003 VENT MGMT INPAT SUBQ DAY: CPT

## 2019-01-15 PROCEDURE — 00000146 ZZHCL STATISTIC GLUCOSE BY METER IP

## 2019-01-15 PROCEDURE — 25000128 H RX IP 250 OP 636: Performed by: NURSE PRACTITIONER

## 2019-01-15 PROCEDURE — 25000125 ZZHC RX 250: Performed by: NURSE PRACTITIONER

## 2019-01-15 PROCEDURE — 25000132 ZZH RX MED GY IP 250 OP 250 PS 637: Performed by: STUDENT IN AN ORGANIZED HEALTH CARE EDUCATION/TRAINING PROGRAM

## 2019-01-15 PROCEDURE — 25000132 ZZH RX MED GY IP 250 OP 250 PS 637: Performed by: THORACIC SURGERY (CARDIOTHORACIC VASCULAR SURGERY)

## 2019-01-15 PROCEDURE — 84132 ASSAY OF SERUM POTASSIUM: CPT | Performed by: INTERNAL MEDICINE

## 2019-01-15 PROCEDURE — 99291 CRITICAL CARE FIRST HOUR: CPT | Performed by: NURSE PRACTITIONER

## 2019-01-15 PROCEDURE — P9047 ALBUMIN (HUMAN), 25%, 50ML: HCPCS | Performed by: NURSE PRACTITIONER

## 2019-01-15 PROCEDURE — 27210437 ZZH NUTRITION PRODUCT SEMIELEM INTERMED LITER

## 2019-01-15 PROCEDURE — 25000128 H RX IP 250 OP 636: Performed by: SURGERY

## 2019-01-15 PROCEDURE — 20000003 ZZH R&B ICU

## 2019-01-15 PROCEDURE — 25000128 H RX IP 250 OP 636: Performed by: STUDENT IN AN ORGANIZED HEALTH CARE EDUCATION/TRAINING PROGRAM

## 2019-01-15 PROCEDURE — 25000131 ZZH RX MED GY IP 250 OP 636 PS 637: Performed by: THORACIC SURGERY (CARDIOTHORACIC VASCULAR SURGERY)

## 2019-01-15 RX ORDER — IPRATROPIUM BROMIDE AND ALBUTEROL SULFATE 2.5; .5 MG/3ML; MG/3ML
3 SOLUTION RESPIRATORY (INHALATION)
Status: DISCONTINUED | OUTPATIENT
Start: 2019-01-15 | End: 2019-01-28

## 2019-01-15 RX ORDER — SODIUM PHOSPHATE, DIBASIC AND SODIUM PHOSPHATE, MONOBASIC 3.5; 9.5 G/66ML; G/66ML
1 ENEMA RECTAL ONCE
Status: DISCONTINUED | OUTPATIENT
Start: 2019-01-15 | End: 2019-01-15 | Stop reason: ALTCHOICE

## 2019-01-15 RX ORDER — PREDNISONE 20 MG/1
40 TABLET ORAL DAILY
Status: COMPLETED | OUTPATIENT
Start: 2019-01-15 | End: 2019-01-19

## 2019-01-15 RX ORDER — WARFARIN SODIUM 2.5 MG/1
2.5 TABLET ORAL
Status: COMPLETED | OUTPATIENT
Start: 2019-01-15 | End: 2019-01-15

## 2019-01-15 RX ORDER — BUDESONIDE 9 MG/1
9 TABLET, FILM COATED, EXTENDED RELEASE ORAL DAILY
Status: DISCONTINUED | OUTPATIENT
Start: 2019-01-15 | End: 2019-01-15

## 2019-01-15 RX ORDER — ALBUMIN (HUMAN) 12.5 G/50ML
25 SOLUTION INTRAVENOUS EVERY 8 HOURS
Status: COMPLETED | OUTPATIENT
Start: 2019-01-15 | End: 2019-01-16

## 2019-01-15 RX ADMIN — ALBUMIN HUMAN 25 G: 0.25 SOLUTION INTRAVENOUS at 02:23

## 2019-01-15 RX ADMIN — NYSTATIN 500000 UNITS: 500000 SUSPENSION ORAL at 21:56

## 2019-01-15 RX ADMIN — NYSTATIN 500000 UNITS: 500000 SUSPENSION ORAL at 01:04

## 2019-01-15 RX ADMIN — ALBUMIN HUMAN 25 G: 0.25 SOLUTION INTRAVENOUS at 21:55

## 2019-01-15 RX ADMIN — FENTANYL CITRATE 50 MCG: 50 INJECTION, SOLUTION INTRAMUSCULAR; INTRAVENOUS at 20:18

## 2019-01-15 RX ADMIN — METOPROLOL TARTRATE 2.5 MG: 5 INJECTION, SOLUTION INTRAVENOUS at 16:04

## 2019-01-15 RX ADMIN — POTASSIUM CHLORIDE 20 MEQ: 400 INJECTION, SOLUTION INTRAVENOUS at 05:29

## 2019-01-15 RX ADMIN — SODIUM PHOSPHATE, DIBASIC AND SODIUM PHOSPHATE, MONOBASIC 1 ENEMA: 7; 19 ENEMA RECTAL at 08:46

## 2019-01-15 RX ADMIN — POTASSIUM CHLORIDE 20 MEQ: 400 INJECTION, SOLUTION INTRAVENOUS at 06:31

## 2019-01-15 RX ADMIN — SODIUM CHLORIDE: 9 INJECTION, SOLUTION INTRAVENOUS at 01:02

## 2019-01-15 RX ADMIN — SODIUM CHLORIDE: 9 INJECTION, SOLUTION INTRAVENOUS at 16:38

## 2019-01-15 RX ADMIN — DEXMEDETOMIDINE HYDROCHLORIDE 0.3 MCG/KG/HR: 100 INJECTION, SOLUTION INTRAVENOUS at 08:13

## 2019-01-15 RX ADMIN — WARFARIN SODIUM 2.5 MG: 2.5 TABLET ORAL at 18:08

## 2019-01-15 RX ADMIN — MEROPENEM 500 MG: 500 INJECTION, POWDER, FOR SOLUTION INTRAVENOUS at 01:02

## 2019-01-15 RX ADMIN — IPRATROPIUM BROMIDE AND ALBUTEROL SULFATE 3 ML: .5; 2.5 SOLUTION RESPIRATORY (INHALATION) at 11:36

## 2019-01-15 RX ADMIN — DEXMEDETOMIDINE HYDROCHLORIDE 0.4 MCG/KG/HR: 100 INJECTION, SOLUTION INTRAVENOUS at 22:57

## 2019-01-15 RX ADMIN — INSULIN ASPART 1 UNITS: 100 INJECTION, SOLUTION INTRAVENOUS; SUBCUTANEOUS at 19:27

## 2019-01-15 RX ADMIN — MEROPENEM 500 MG: 500 INJECTION, POWDER, FOR SOLUTION INTRAVENOUS at 08:47

## 2019-01-15 RX ADMIN — CHLORHEXIDINE GLUCONATE 15 ML: 1.2 RINSE ORAL at 20:23

## 2019-01-15 RX ADMIN — PREDNISONE 40 MG: 20 TABLET ORAL at 16:04

## 2019-01-15 RX ADMIN — NYSTATIN 500000 UNITS: 500000 SUSPENSION ORAL at 12:18

## 2019-01-15 RX ADMIN — CHLORHEXIDINE GLUCONATE 15 ML: 1.2 RINSE ORAL at 08:24

## 2019-01-15 RX ADMIN — OXYCODONE HYDROCHLORIDE 5 MG: 5 TABLET ORAL at 19:49

## 2019-01-15 RX ADMIN — Medication 40 MG: at 12:18

## 2019-01-15 RX ADMIN — ASPIRIN 81 MG 81 MG: 81 TABLET ORAL at 12:20

## 2019-01-15 RX ADMIN — HEPARIN SODIUM 5000 UNITS: 10000 INJECTION, SOLUTION INTRAVENOUS; SUBCUTANEOUS at 19:29

## 2019-01-15 RX ADMIN — IPRATROPIUM BROMIDE AND ALBUTEROL SULFATE 3 ML: .5; 2.5 SOLUTION RESPIRATORY (INHALATION) at 15:29

## 2019-01-15 RX ADMIN — HEPARIN SODIUM 5000 UNITS: 10000 INJECTION, SOLUTION INTRAVENOUS; SUBCUTANEOUS at 12:42

## 2019-01-15 RX ADMIN — POTASSIUM CHLORIDE 20 MEQ: 1.5 POWDER, FOR SOLUTION ORAL at 18:08

## 2019-01-15 RX ADMIN — MULTIVITAMIN 15 ML: LIQUID ORAL at 12:22

## 2019-01-15 RX ADMIN — HEPARIN SODIUM 5000 UNITS: 10000 INJECTION, SOLUTION INTRAVENOUS; SUBCUTANEOUS at 04:48

## 2019-01-15 RX ADMIN — IPRATROPIUM BROMIDE AND ALBUTEROL SULFATE 3 ML: .5; 2.5 SOLUTION RESPIRATORY (INHALATION) at 18:57

## 2019-01-15 RX ADMIN — ALBUMIN HUMAN 25 G: 0.25 SOLUTION INTRAVENOUS at 14:13

## 2019-01-15 RX ADMIN — NYSTATIN 500000 UNITS: 500000 SUSPENSION ORAL at 18:08

## 2019-01-15 RX ADMIN — MEROPENEM 500 MG: 500 INJECTION, POWDER, FOR SOLUTION INTRAVENOUS at 16:04

## 2019-01-15 RX ADMIN — ALBUMIN HUMAN 25 G: 0.25 SOLUTION INTRAVENOUS at 10:25

## 2019-01-15 RX ADMIN — Medication 50 MCG/HR: at 07:30

## 2019-01-15 ASSESSMENT — MIFFLIN-ST. JEOR: SCORE: 1379.47

## 2019-01-15 ASSESSMENT — ACTIVITIES OF DAILY LIVING (ADL)
ADLS_ACUITY_SCORE: 17

## 2019-01-15 NOTE — PROGRESS NOTES
Renal Medicine Progress Note    SHORTHAND KEY FOR MY NOTES:  c = with, s = without, p = after, a = before, x = except, asx = asymptomatic, tx = transplant or treatment, sx = symptoms or symptomatic, cx = canceled or culture, rxn = reaction, yday = yesterday, nl = normal, abx = antibiotics, fxn = function, dx = diagnosis, dz = disease, m/h = melena/hematochezia, c/d/l/ha = cramping/dizziness/lightheadedness/headache, d/c = discharge or diarrhea/constipation, f/c/n/v = fevers/chills/nausea/vomiting, cp/sob = chest pain/shortness of breath.         Assessment/Plan:     1.  Non-oliguric IMAN/CKD.  Pt's cr is stable and uo is ok.  Responding nicely to scheduled alb.  No need for diuretics still.  Once the cr trends down, and if BP is stable, we can consider mobilizig fluid c IV alb/diuretic combo - not yet, though.  A.  Continue alb.  B.  Hold diuretics for now.  C.  Follow labs, uo.    2.  S/p aortic dissection/repair.  Stable.  A.  Per CV Surg.    3.  Resp failure/possible pneumonia.  Pt is on the vent.  Remains on broad abx.  WBC is better today; yday's rise was prob due to hemoconcentration.  A.  Weaning per ICU.    4.  FEN.   Na is still on the high side.  She has a 3L free water deficit + ongoing insensible losses.  A.  Follow Na.  B.  Increase free water to 200 q 2h.        Interval History:     Unable.  Pt is intubated/sedated.          Medications and Allergies:       aspirin  81 mg Per Feeding Tube Daily     chlorhexidine  15 mL Swish & Spit BID     heparin  5,000 Units Subcutaneous Q8H     influenza Vac Split High-Dose  0.5 mL Intramuscular Prior to discharge     insulin aspart  1-6 Units Subcutaneous Q4H     ipratropium - albuterol 0.5 mg/2.5 mg/3 mL  3 mL Nebulization 4x daily     lidocaine (PF)  10 mL Subcutaneous Once     meropenem  500 mg Intravenous Q8H     metoprolol  2.5 mg Intravenous Q6H     multivitamins w/minerals  15 mL Per Feeding Tube Daily     nystatin  500,000 Units Oral 4x Daily      "pantoprazole  40 mg Oral QAM AC     sodium chloride (PF)  10 mL Intracatheter Q8H     sodium chloride (PF)  3 mL Intracatheter Q8H     sodium phosphate  1 enema Rectal Once     vancomycin place frausto - receiving intermittent dosing  1 each Does not apply See Admin Instructions     Allergies   Allergen Reactions     Amoxicillin Swelling     Ciprofloxacin Swelling          Physical Exam:     Vitals were reviewed  Heart Rate: 80, Blood pressure 141/77, pulse 80, temperature 98.1  F (36.7  C), resp. rate 17, height 1.626 m (5' 4\"), weight 90.4 kg (199 lb 6.4 oz), SpO2 96 %.  Wt Readings from Last 3 Encounters:   01/15/19 90.4 kg (199 lb 6.4 oz)   09/14/08 80.7 kg (178 lb)     Intake/Output Summary (Last 24 hours) at 1/15/2019 1102  Last data filed at 1/15/2019 1000  Gross per 24 hour   Intake 1738.91 ml   Output 1905 ml   Net -166.09 ml     GENERAL APPEARANCE: intubated, sedated  HEENT:  Eyes/ears/nose/neck grossly normal x +OG  RESP: + vent sounds, expiratory \"pop\", no obv crackles  CV: RRR, nl S1/S2, + pacer   ABDOMEN: o/s/nt/nd  EXTREMITIES/SKIN: 1+ edema  NEURO:  sedated  LINES:  + coello c yellow urine; + RIJ 3-lumen         Data:     CBC RESULTS:     Recent Labs   Lab 01/15/19  0446 01/14/19  0406 01/13/19  0600 01/12/19  2225 01/12/19  1727 01/12/19  0745   WBC 16.7* 27.3* 21.6* 19.7* 22.9* 20.9*   RBC 2.80* 3.26* 2.75* 2.74* 2.98* 3.08*   HGB 8.3* 9.7* 8.1* 8.0* 8.7* 9.1*   HCT 24.1* 27.6* 23.1* 22.9* 24.9* 26.3*    250 189 176 200 199     Basic Metabolic Panel:  Recent Labs   Lab 01/15/19  0955 01/15/19  0446 01/14/19  0406 01/14/19  0008 01/13/19  1950 01/13/19  0600 01/12/19  2225 01/12/19  1727   NA  --  149* 148*  --  150* 149* 146* 141   POTASSIUM 3.6 3.1* 3.8 3.6 3.1* 3.5 3.5 3.7   CHLORIDE  --  110* 110*  --  111* 112* 111* 110*   CO2  --  30 26  --  28 23 20 15*   BUN  --  59* 63*  --  63* 61* 61* 61*   CR  --  1.95* 1.95*  --  1.84* 1.79* 1.74* 1.75*   GLC  --  115* 116*  --  111* 98 101* 96 "   BESS  --  8.4* 8.2*  --  8.0* 7.8* 7.3* 7.8*     INR  Recent Labs   Lab 01/15/19  0446 01/14/19  0406 01/13/19  0600 01/11/19  1046   INR 1.89* 1.58* 1.59* 1.28*      Attestation:   I have reviewed today's relevant vital signs, notes, medications, labs and imaging.    Osman Madrid MD  [facility] - Nephrology  [phone]

## 2019-01-15 NOTE — PROGRESS NOTES
CT scan unremarkable from GI stand point.  Will plan for flex sig tomorrow.  NPO after mid night. Hold tube feeding    [facility]

## 2019-01-15 NOTE — PROGRESS NOTES
[facility]  Cardiovascular and Thoracic Surgery Daily Note          Assessment and Plan:   POD#11 s/p Left groin cutdown with exposure of the femoral vessels with arterial cannulation for cardiopulmonary bypass. Right groin cutdown with exposure of the femoral vessels with the right femoral artery ultimately used for diagnostic angiography. 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 Visceral angiography. Repair of the bilateral femoral vessels. By Dr. Jose Winslow.    Main Plans for today:  1. PS trial per critical care  2. Flex sig scope  3. Work with therapies  4. Albumin/no diuresis    CVS:   BP currently managed with norepi drip- intravascularly dry after lasix drip. Nephrology seeing- appreciate recs. Currently receiving albumin; HR 80/paced.  Echo on 1/7/29 demonstrated an LVEF 55-60%  On ASA, BB (held currently 2/2 vasopressor needs).  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 On oral amiodarone and coumadin with no bridging. Appreciate EP recs.  Pulm:  Extubated per protocol and reintubated on POD #8 for resp distress.  CT chest on 1/14 demonstrates RLL compressive atelectasis 2/2 elevated hemidiaphragm (present preoperatively).  POD #7 left US guided thoracentesis with removal of 150 mL fluid.   Tolerating current vent settings: .4/500/18/7  CXR/CT chest with fluid in fissures. Diuresing with assistance of nephrology.  Neuro:  Grossly intact, weakly follows commands. Pain managed with fentanyl drip and sedated with precedex drip.  Renal:  IMAN -BL Creat ~0.8, today 1.95; Preop weight 77.1 kg, today 90.4, down 0.9 kg overnight.   Renal US on 1/6 with patent flow in bilateral renal arteries. Right kidney atrophic.  Was on lasix drip over weekend with subsequent increase in creat. Renal re-consulted. Appreciate. Suspect patient is dry - currently on-off norepi with hemoconcentration on  labwork on . Receiving albumin per nephrology with no diuresis today. U/O adequate.  BMP in AM  GI:  Presumed colitis. Malnourished. Was receiving tube feedings via OG. Currently NPO in anticipation of flex sig today. Appreciate GI consult.   General surgery consulted over weekend to evaluate for ischemia/colitis. 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  CT abdomen on  does not demonstrate any acute pathology. Questionable diverticulosis.    and  negative c. Diff.  Likely restart tube feedings once scope completed.  On PPI.  :  Barrientos present - strict I/O, limited mobility. Clear yellow UO.  Endo:   Preop A1C 5.3; managed on insulin drip postop, currently well managed on sliding scale insulin with goal BG <180.   FEN:   Replace lytes as needed; Hypernatremic. Free water increased due to free water deficit.  Restart tube feedings when able.  ID:   Temp (24hrs), Av.9  F (37.7  C), Min:98.1  F (36.7  C), Max:100.8  F (38.2  C)  Stress induced leukocytosis: WBC 16.7 today Tmax 100.8  Completed perioperative antibiotics.  Currently on Vanco and Edilson since  for worsening leukocytosis and hypotension.  Blood/sputum cultures pending. Follow.  No UTI. Negative c diff x 2.  CBC in AM  Heme:   Acute blood loss anemia and thrombocytopenia: Hgb 8.3; Plt 237  Given blood on   CBC in AM  Tubes/Drains:  Right internal jugular line  OG  Barrientos  Proph:   BB when able, statin, ASA, subcutaneous heparin, PCD, PPI  Dispo:   Continue in ICU with close monitoring.  Appreciate consulting services.          Interval History:   Lying in bed sedated on ventilator. Weakly follows commands, nods/shakes head appropriately. On/off norepi overnight.         Medications:       aspirin  81 mg Per Feeding Tube Daily     chlorhexidine  15 mL Swish & Spit BID     heparin  5,000 Units Subcutaneous Q8H     influenza Vac Split High-Dose  0.5 mL Intramuscular Prior to discharge     insulin aspart  1-6  "Units Subcutaneous Q4H     lidocaine (PF)  10 mL Subcutaneous Once     meropenem  500 mg Intravenous Q8H     metoprolol  2.5 mg Intravenous Q6H     multivitamins w/minerals  15 mL Per Feeding Tube Daily     nystatin  500,000 Units Oral 4x Daily     pantoprazole  40 mg Oral QAM AC     sodium chloride (PF)  10 mL Intracatheter Q8H     sodium chloride (PF)  3 mL Intracatheter Q8H     sodium phosphate  1 enema Rectal Once     vancomycin place frausto - receiving intermittent dosing  1 each Does not apply See Admin Instructions     acetaminophen, albuterol, IV fluid REPLACEMENT ONLY, IV fluid REPLACEMENT ONLY, glucose **OR** dextrose **OR** glucagon, fentaNYL, heparin lock flush, hydrALAZINE, lidocaine 4%, lidocaine 4%, lidocaine (buffered or not buffered), lidocaine (buffered or not buffered), magnesium sulfate, magnesium sulfate, meperidine, methocarbamol, naloxone, ondansetron **OR** ondansetron, oxyCODONE IR, oxyCODONE-acetaminophen, potassium chloride, potassium chloride with lidocaine, potassium chloride, potassium chloride, potassium chloride, sodium chloride (PF), sodium chloride (PF), sodium chloride (PF), [COMPLETED] sodium phosphate **FOLLOWED BY** sodium phosphate, sodium phosphate, sodium phosphate, sodium phosphate, sodium phosphate, Warfarin Therapy Reminder          Physical Exam:   Vitals were reviewed  Blood pressure 141/77, pulse 80, temperature 98.1  F (36.7  C), resp. rate 17, height 1.626 m (5' 4\"), weight 90.4 kg (199 lb 6.4 oz), SpO2 96 %.  Rhythm: VVI paced at 80 bmp on bedside monitor    Lungs: CTA on ventilator. Occasional wheezes appreciated    Cardiovascular: S1, S2, RRR, no m/r/g.     Abdomen: Sl. Distended, NT. + BS. OG in place    Extremeties: 2-3+ edema with 2+ palp pedal pulses    Incision(s): CDI    CT: N/A    Weight:   Vitals:    01/09/19 0600 01/12/19 0600 01/13/19 0600 01/14/19 0500   Weight: 88.7 kg (195 lb 8.8 oz) 92.3 kg (203 lb 7.8 oz) 92.5 kg (203 lb 14.8 oz) 91.5 kg (201 lb 11.5 " oz)    01/15/19 0500   Weight: 90.4 kg (199 lb 6.4 oz)            Data:    All labs and imaging reviewed by me.  Labs:   Lab Results   Component Value Date    WBC 16.7 01/15/2019     Lab Results   Component Value Date    RBC 2.80 01/15/2019     Lab Results   Component Value Date    HGB 8.3 01/15/2019     Lab Results   Component Value Date    HCT 24.1 01/15/2019     No components found for: MCT  Lab Results   Component Value Date    MCV 86 01/15/2019     Lab Results   Component Value Date    MCH 29.6 01/15/2019     Lab Results   Component Value Date    MCHC 34.4 01/15/2019     Lab Results   Component Value Date    RDW 16.2 01/15/2019     Lab Results   Component Value Date     01/15/2019       Last Basic Metabolic Panel:  Lab Results   Component Value Date     01/15/2019      Lab Results   Component Value Date    POTASSIUM 3.6 01/15/2019     Lab Results   Component Value Date    CHLORIDE 110 01/15/2019     Lab Results   Component Value Date    BESS 8.4 01/15/2019     Lab Results   Component Value Date    CO2 30 01/15/2019     Lab Results   Component Value Date    BUN 59 01/15/2019     Lab Results   Component Value Date    CR 1.95 01/15/2019     Lab Results   Component Value Date     01/15/2019         GRZEGORZ García, CCRN-CSC  CV Surgery  [facility] Pager: [phone]  Personal Pager: [phone]      Patient seen and examined. Agree with plan.

## 2019-01-15 NOTE — PHARMACY-VANCOMYCIN DOSING SERVICE
Pharmacy Vancomycin Note  Date of Service 2019  Patient's  1940   78 year old, female    Indication: Sepsis  Goal Trough Level: 15-20 mg/L  Day of Therapy: 3  Current Vancomycin regimen:  2000 mg IV intermittent    Current estimated CrCl = Estimated Creatinine Clearance: 26.9 mL/min (A) (based on SCr of 1.95 mg/dL (H)).    Creatinine for last 3 days  2019:  1:15 AM Creatinine 1.54 mg/dL;  7:45 AM Creatinine 1.67 mg/dL;  5:27 PM Creatinine 1.75 mg/dL; 10:25 PM Creatinine 1.74 mg/dL  2019:  6:00 AM Creatinine 1.79 mg/dL;  7:50 PM Creatinine 1.84 mg/dL  2019:  4:06 AM Creatinine 1.95 mg/dL    Recent Vancomycin Levels (past 3 days)  2019: 11:38 AM Vancomycin Level 22.2 mg/L  2019:  6:00 PM Vancomycin Level 29.4 mg/L    Vancomycin IV Administrations (past 72 hours)                   vancomycin (VANCOCIN) 2,000 mg in sodium chloride 0.9 % 500 mL intermittent infusion (mg) 2,000 mg New Bag 19 175                Nephrotoxins and other renal medications (From now, onward)    Start     Dose/Rate Route Frequency Ordered Stop    19  norepinephrine (LEVOPHED) 16 mg in D5W 250 mL infusion      0.03-0.4 mcg/kg/min × 92.3 kg  2.6-34.6 mL/hr  Intravenous CONTINUOUS 19 09  vancomycin place rfausto - receiving intermittent dosing      1 each Does not apply SEE ADMIN INSTRUCTIONS 19 0909               Contrast Orders - past 72 hours (72h ago, onward)    None          Interpretation of levels and current regimen:  Random level 24 hour after previous dose is  Supratherapeutic    Has serum creatinine changed > 50% in last 72 hours: No but has been increasing    Urine output:  Was low, was on furosemide drip - stop today, UO looks adequate today    Renal Function: IMAN    Plan:  1.  Hold further dosing, continue intermittent per levels  2.  Pharmacy will check trough levels as appropriate in 115 AM.      Grace Santacruz        .

## 2019-01-15 NOTE — PROGRESS NOTES
Clinically patient is unchanged patient is still having significant diarrhea patient has extensively complicated history with aortic dissection repair postop acute kidney disease on top of chronic kidney diseasePatient is in renal failure electrolyte abnormalities.    Flexible sigmoidoscopy done today.  Findings are consistent with diffusely congested mucosa up to 40 cm extensive diverticulosis in the sigmoid colon biopsies were done in the sigmoid colon no focal lesion identified in the colon.  Clinical appearance is suggestive of possible microscopic colitis.  Await biopsy result.  I would recommend the patient to be empirically started on budesonide 9 mg daily through feeding tube or p.o. other option would be starting on IV steroids.  Discussed with the ICU team.  GI will continue to follow along.    [facility]

## 2019-01-15 NOTE — PROGRESS NOTES
Chart checked  Flex sig completed  Described congested mucosa but no ischemia  Wbc improving  No indication for general surgery intervention  We will sign off

## 2019-01-15 NOTE — PROGRESS NOTES
CLINICAL NUTRITION SERVICES - REASSESSMENT NOTE      Recommendations Ordered by Registered Dietitian (RD):   Restart Peptamen 1.5 at 10 mL/hr = 360 kcals (6 kcal/kg), 16 gm pro (0.3 gm/kg), 185 mL H20, 45 gm CHO, no fiber   Future/Additional Recommendations:    Recommend eventual goal TF Peptamen 1.5 at 50 mL/hr = 1800 kcals (29 kcal/kg), 82 gm pro (1.3 gm/kg), 924 mL H20, 226 gm CHO, no fiber.    If unable to increase TF rate, recommend consider PPN as a bridge to enteral nutrition   Malnutrition:  (1/12)  % Weight Loss:  None noted  % Intake:  </= 50% for >/= 5 days (severe malnutrition)  Subcutaneous Fat Loss:  None observed  Muscle Loss:  Temporal region mild depletion and Clavicle bone region mild depletion  Fluid Retention:  Mild - Could be partly nutritional due to prolonged poor nutrition     Malnutrition Diagnosis: Non-Severe malnutrition  In Context of:  Acute illness or injury       EVALUATION OF PROGRESS TOWARD GOALS   Diet:  NPO on vent    Nutrition Support:    - TPN was never started on 1/12 as unable to place a PICC line.  - Trickle TF via OG tube was started on 1/13 - Peptamen 1.5 at 10 mL/hr but held at MN for test.  - Was asked to restart trickle TF today.    Intake:    Limited nutritional intake since admission x 11 days.  Na 149 (H)  K 3.1 (L)  BUN 59 (H)  Cr 1.95 (H) - Per renal, pt with non-oliguric IMAN on CKD  Loose stools yesterday --> C-Diff negative.  BGM:   range (Med Res sliding scale insulin for glycemic control).  Wt:  90.4 kg (up 6.4 kg since admit).  Pt with 2+ mild generalized edema.    ASSESSED NUTRITION NEEDS PER APPROVED PRACTICE GUIDELINES:     Dosing Weight:  62 kg (adjusted for overweight)  Estimated Energy Needs:  5656-8502 kcals (25-30 Kcal/Kg)  Justification: overweight  Estimated Protein Needs:  74-87 grams protein (1.2-1.4 g pro/Kg)  Justification: Repletion, post-op and atrophic right kidney (monitor tolerance to protein and liberalize as able)    NEW FINDINGS:    1/14:  CT (chest, pelvis, abd) =   1. Suspicious for pneumonia.  2. Small bilateral pleural effusions and mild ascites is new since prior CT.  3. No obvious acute intra-abdominal or pelvic abnormality.    1/15:  Flex sig = diffusely congested mucosa up to 40 cm extensive diverticulosis in the sigmoid colon biopsies were done in the sigmoid colon no focal lesion identified in the colon. Clinical appearance is suggestive of possible microscopic colitis.  Await biopsy result.    Pt on Norepinephrine, Precedex  IVF NaCl at 20 mL/hr  Certavite daily       Previous Goals (1/12):   TPN/Lipid will meet % estimated needs in 48-72 hrs.  Evaluation: Not met    Previous Nutrition Diagnosis (1/12):   Inadequate protein-energy intake related to altered GI function s/p heart surgery as evidenced by decreased nutritional intake since admit x 8 days, meeting 0% needs, and TPN planned.  Evaluation: No change      CURRENT NUTRITION DIAGNOSIS  Inadequate protein-energy intake related to altered GI function s/p heart surgery and unable to place PICC for TPN as evidenced by limited nutritional intake since admission x 11 days with plan to restart trickle TF today    INTERVENTIONS  Recommendations / Nutrition Prescription  Restart Peptamen 1.5 at 10 mL/hr = 360 kcals (6 kcal/kg), 16 gm pro (0.3 gm/kg), 185 mL H20, 45 gm CHO, no fiber    Recommend eventual goal TF Peptamen 1.5 at 50 mL/hr = 1800 kcals (29 kcal/kg), 82 gm pro (1.3 gm/kg), 924 mL H20, 226 gm CHO, no fiber.    If unable to increase TF rate, recommend consider PPN as a bridge to goal enteral nutrition      Implementation  Collaboration and Referral of Nutrition care - Discussed pt during ICU interdisciplinary rounds this morning and later with bedside RN who requested that I sent TF product ASAP.    Goals  Goal TF Peptamen 1.5 at 50 mL/hr will be achieved in the next 48 hrs  Pt will tolerate nutrition support without refeeding syndrome      MONITORING AND  EVALUATION:  Progress towards goals will be monitored and evaluated per protocol and Practice Guidelines    Nazanin Todd RD, LD, CNSC

## 2019-01-15 NOTE — PROGRESS NOTES
Date of Admission: 1/4/2019  Date of Intubation (most recent):1/12/2019  Reason for Mechanical Ventilation: Resp failure  Number of Days on Mechanical Ventilation:4  Met Criteria for Pressure Support Trial:     Yes  Reason for No Pressure Support Trial:Per MD    Ventilation Mode: CMV/AC  (Continuous Mandatory Ventilation/ Assist Control)  FiO2 (%): 40 %  Rate Set (breaths/minute): 18 breaths/min  Tidal Volume Set (mL): 500 mL  PEEP (cm H2O): 7 cmH2O  Pressure Support (cm H2O): 12 cmH2O  Oxygen Concentration (%): 40 %  Resp: 17    Recent Labs   Lab 01/13/19  0407 01/12/19  2135 01/12/19  1744 01/12/19  1140 01/09/19  1039   PH 7.44 7.39 7.41 7.36  --    PCO2 31* 30* 23* 22*  --    PO2 110* 77* 75* 63*  --    HCO3 21 18* 15* 13*  --    O2PER  --   --   --  50 96   plan: continue full vent support tonight

## 2019-01-15 NOTE — PLAN OF CARE
Neuro: Opens eyes and tracks with turns, purposeful movements in upper extremities toward ETT.  Resp: Stable on current vent settings, minimal secretions  CV: Levophed weaned down to 0.07 keeping MAP>/=65  GI: Flexiseal in place for loose stools, minimal output last 4 hours for writer, abdomen appears tender to palpation  : Good UO off Lasix drip  Skin: No new issues, groin sites WDL, PPM site CDI.  Family updated at bedside on plan of care. Shania PARIS updated at bedside on patient progress.    Katelyn Vargas RN, CCRN-CSC

## 2019-01-15 NOTE — PROGRESS NOTES
Vascular Surgery Progress Note    CT scan completed today, no significant abdominal findings, bilateral effusions  Remains intubated in critical condition in ICU, loose stool via flexiseal, on levophed.    B/P: 141/77, T: 98.1, P: 80, R: 17  Vented and sedated, moves lower extremities spontaneously  Abd soft, moderately stable, mild diffuse tenderness although appears stable from prior.    WBC   Date Value Ref Range Status   01/15/2019 16.7 (H) 4.0 - 11.0 10e9/L Final   ]  Hemoglobin   Date Value Ref Range Status   01/15/2019 8.3 (L) 11.7 - 15.7 g/dL Final   ]  INR   Date Value Ref Range Status   01/15/2019 1.89 (H) 0.86 - 1.14 Final      Creatinine   Date Value Ref Range Status   01/15/2019 1.95 (H) 0.52 - 1.04 mg/dL Final   ]      Intake/Output Summary (Last 24 hours) at 1/15/2019 0930  Last data filed at 1/15/2019 0800  Gross per 24 hour   Intake 1794.41 ml   Output 2155 ml   Net -360.59 ml       Assessment/Plan:  78 year old female s/p Type A dissection with repair and TEVAR, complicated by no flow to right renal artery initially and SMA occlusion - both improved with proximal repair of dissection.     Continue ICU care - vent management, wean pressors as able  Sedation holiday to assess neuro status as able  Serial abdominal exams, currently trickle feeds.  Will continue to follow.    Saige Brown MD  Vascular Surgery Fellow  Pager [phone]

## 2019-01-15 NOTE — PROGRESS NOTES
Date of Admission: 1/4/2019  Date of Intubation (most recent):1/12/2019  Reason for Mechanical Ventilation: Resp failure  Number of Days on Mechanical Ventilation:4  Met Criteria for Pressure Support Trial:     Yes  Reason for No Pressure Support Trial:      Ventilation Mode: CMV/AC  (Continuous Mandatory Ventilation/ Assist Control)  FiO2 (%): 40 %  Rate Set (breaths/minute): 18 breaths/min  Tidal Volume Set (mL): 500 mL  PEEP (cm H2O): 7 cmH2O  Pressure Support (cm H2O): 12 cmH2O  Oxygen Concentration (%): 40 %  Resp: 17    Plan- assess for weaning readiness.1/15/2019  .Dusty Harley

## 2019-01-15 NOTE — PROGRESS NOTES
[facility]  Critical Care Service  Progress Note  Date of Service (when I saw the patient): 01/15/2019  Main Plans for Today    Duoneb scheduled QID   Resume diuretic   Plan for colonoscopy vs flex sig with GI today   Wean norepi off if able   Budesenide daily for colitis      Assessment & Plan    Priya Funez is a 78 year old female with PMH of HTN admitted on 1/4/2019 with sudden onset of back pain found to have type A aortic dissection and sent for emergency aortic dissection repair with graft.  She was admitted to ICU for ongoing care.     1/5: did well overnight.  Epi weaned off; requiring low dose phenylephrine.  Hgb stable. Extubated in the afternoon.   1/6:poor inspiratory effort and weak cough through the day.  Placed on Bipap overnight.  NTG gtt for improved BP control.  Rigors and fever to 103  1/7: Fever resolved; stable respiratory status on HFNC.  SvO2 48; getting echocardiogram.  Normal cardiac output; EF 55-60%   1/8: Rapid A Fib  1/9: Titrating down FiO2; improved ABG and VBG   1/11-12, hypoxic requiring bipap with little reserve, low UO, fluid resuscitated  with albumin blood and lasix  1/13: re-intubated late on 1/12; significant metabolic acidosis, increased work of breathing      Neuro  1. Sedation   2. ICU delirium  3. Acute pain  Plan:  --Fentanyl drip and PRN acetaminophen  --Precedex as needed for sedation   -- Maintain circadian rhythm.  Lights on during the day.  Off at night, minimize cares at night.  OOB during the day.      CV  1. S/p Type A aortic dissection repair 1/4 with Dvais Little, and Michaela  2. HTN  3. Afib with RVR, uncontrolled with medications now s/p AV node ablation and pacemaker placement 1/11/19  4. Shock; unclear source hypovolemic vs septic; normal lactic acid but increasing leukocytosis and ongoing hypotension requiring vasopressor. Now resolved    Plan:  --CV Surgery and Vascular surgery primary   --Monitor hemodynamics.  Maintain SBP<130;  MAP >65  --Noepi as needed to maintain SBP>65; being titrated down and should be able to come off   --Cardiac meds per CV surgery, metoprolol held secondary to vasopressor   --Echocardiogram 1/7; EF 50-55%; IVC not dilated, ventricles small   --started on Warfarin for Afib; no bridging.  Continue subcutaneous heparin until INR therapeutic. Dose held on 1/14 pending flex sig on 1/15; resume Warfarin 1/15     Resp:  1. Post operative ventilator management; resolved   2. Acute hypoxemic /hypercapnic respiratory failure.    Plan:  -- Initially tolerated HFO2 then bipap then re-intubated 1/12 secondary to increased WOB and SBO.  Only mild hypoxia on ABG at time of re-intubation; had profound metabolic acidosis (now resolved) and work of breathing likely reflected attempt to compensate.     --Add scheduled duoneb; albuterol nebs PRN  -- CT CAP 1/14 with moderate amount of fluid in fissures, interstitial edema.         GI/Nutrition  1. Malnutrition, nia/pro deficits  2. Ongoing diarrhea; presumed colitis   Plan:  --Repeat c.diff due to ongoing leukocytosis; liquid stool. Negative     --Liquid stool slowing, abdomin distended, abd flat plate shows edematous bowel pattern.  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 Lactic acid has been normal.  No evidence of ischemia on flex sig   -- General surgery consult to follow with us.  Gastroenterology consulted to evaluate for colitis   -- Continue tube feeds   --Flex sig performed 1/15; diverticulum look fine, mucosa inflamed/congested; favoring colitis.  Biopsy taken.  Will start Budesenide, 9mg daily    --monitor pathology of biopsy results     Renal  1. IMAN; abdominal aortic dissection involving right renal artery.  Atrophic right kidney with decreased flow. No prior hx.  Baseline creatinine appears to be 0.8;   2. Hypophosphatemia   3. Metabolic acidosis; resolved   4. Hypernatremia  Plan:  --Nephrology consulted. Renogram 1/7.  Renal US 1/6 with flow  present in both R/L renal arteries. Right kidney atrophic.    -- Lasix drip stopped this 1/14; patient requiring vasopressors due to hypotension following >24 hours of diuretic drip; worsening renal function.    -- Unclear picture of volume status; suspect patient is intravascularly dry. Has completed course of scheduled albumin.  Holding further diuretic in setting of worsening renal function.    -- Metabolic acidosis resolved.  Lactic acid normal    --Increase enteric free water      ID  1. Fevers (improved)  2. Leukocytosis (ongoing)  Plan:  --andriy-op Cefepime and Vancomycin; completed    -- Cefepime has been continued in setting of leukocytosis; completed 6 days.   --antibiotics broadened to empiric vanco and millie on 1/12 for hypotension and worsening leukocytosis; vanc trough high    -- Blood cultures with ngtd; final culture pending. Urinalysis without concern for UTI; did not reflex to culture. Sputum culture with only candida      Endocrine  1. Stress Hyperglycemia; resolved   Plan:  --  BG check Q 4 hours. Medium resistance sliding scale insulin. Has not been requiring       Heme:  1. Acute blood loss anemia  2. Thrombocytopenia; improving, likely consumptive  3. Superficial thrombophlebitis in the right basilic vein   Plan:  -- Monitor hemoglobin. Transfuse to keep > 7.0  --Warfarin for A Fib; no bridging.  Continue heparin subcutaneous until INR therapeutic   --US lower extremities to assess for DVT; no evidence of DVT       MSK/Skin  1. Sternotomy   Plan:  -- Sternal precautions  -- Wound care per protocol      General cares:  DVT Prophylaxis: continue PCD's; subcutaneous heparin   GI Prophylaxis: PPI  Restraints: Restraints for medical healing needed: YES   Family update by me today: Yes   Current lines are required for patient management  Access: JEANNE Farr  Time Spent on this Encounter   Billing:  I spent 60 minutes bedside and on the inpatient unit today managing the critical care of  Priya Funez in relation to the issues listed in this note.    Interval History    Lightly sedated, indicates she is comfortable. Denies pain.  Does have pain to palpation of left lower quadrant.  Flex sig which showed inflamed and congested mucosa.  Holding further diuretic today.  Family updated at bedside.     Physical Exam   Temp: 98.1  F (36.7  C) Temp src: Esophageal Temp  Min: 98.1  F (36.7  C)  Max: 100.8  F (38.2  C) BP: 141/77 Pulse: 80 Heart Rate: 80 Resp: 17 SpO2: 96 % O2 Device: Mechanical Ventilator    Vitals:    19 0600 19 0500 01/15/19 0500   Weight: 92.5 kg (203 lb 14.8 oz) 91.5 kg (201 lb 11.5 oz) 90.4 kg (199 lb 6.4 oz)     I/O last 3 completed shifts:  In: 1984.66 [I.V.:1074.66; NG/GT:550]  Out: 2230 [Urine:2230]    GEN: intubated, sedated, NAD   EYES: PERRL, Anicteric sclera.   HEENT:  Normocephalic, atraumatic, trachea midline, ETT secure   CV: RRR, 100% V-paced, no gallops, rubs, or murmurs  PULM/CHEST: Clear anterior breath sounds bilaterally, intermittent wheeze, symmetric chest rise  GI: distended, soft , tender to palpation in left lower quadrant   : coello catheter in place, urine yellow and clear  EXTREMITIES: ++ peripheral edema on right, 1+ on left, moving all extremities, peripheral pulses intact  NEURO: sedated, awakens to voice, follows commands    SKIN: warm and dry  Imaging personally reviewed:  Bilateral effusions  EC% V-paced     Data   Recent Labs   Lab 01/15/19  0446 19  0406 19  0008 19  1950 19  0600   WBC 16.7* 27.3*  --   --  21.6*   HGB 8.3* 9.7*  --   --  8.1*   MCV 86 85  --   --  84    250  --   --  189   INR 1.89* 1.58*  --   --  1.59*   * 148*  --  150* 149*   POTASSIUM 3.1* 3.8 3.6 3.1* 3.5   CHLORIDE 110* 110*  --  111* 112*   CO2 30 26  --  28 23   BUN 59* 63*  --  63* 61*   CR 1.95* 1.95*  --  1.84* 1.79*   ANIONGAP 9 12  --  11 14   BESS 8.4* 8.2*  --  8.0* 7.8*   * 116*  --  111* 98   ALBUMIN   --  2.4*  --   --  2.4*   PROTTOTAL  --  5.6*  --   --  5.1*   BILITOTAL  --  2.6*  --   --  2.2*   ALKPHOS  --  111  --   --  85   ALT  --  28  --   --  26   AST  --  40  --   --  33

## 2019-01-15 NOTE — PLAN OF CARE
Weaning Levo drip gradually to maintain MAP > 65. VEDRIN weakly, following commands at times. Lungs sound coarse to clear with minimal ETT secretions. U/O adequate. Flex Sig performed at bedside - pt tolerated procedure well. No vent weaning today. Pt's family updated frequentlhy.

## 2019-01-15 NOTE — PLAN OF CARE
MAP maintained on levo- labile at times. RASS goal maintained at -2 on dex and fentanyl. Pt protects abdomen. Abd CT done- nothing acute GI wise, bilat infiltrates. Tolerating current vent settings- desats at times, improved after suctioning. Albumin given. Good urine output. K+ replaced. TF held for Flex sig today.

## 2019-01-16 ENCOUNTER — APPOINTMENT (OUTPATIENT)
Dept: GENERAL RADIOLOGY | Facility: CLINIC | Age: 79
DRG: 003 | End: 2019-01-16
Payer: COMMERCIAL

## 2019-01-16 LAB
ANION GAP SERPL CALCULATED.3IONS-SCNC: 9 MMOL/L (ref 3–14)
BASE DEFICIT BLDA-SCNC: NORMAL MMOL/L
BASE EXCESS BLDA CALC-SCNC: 3.6 MMOL/L
BASE EXCESS BLDA CALC-SCNC: NORMAL MMOL/L
BASE EXCESS BLDV CALC-SCNC: 4.7 MMOL/L
BASE EXCESS BLDV CALC-SCNC: 6 MMOL/L
BUN SERPL-MCNC: 60 MG/DL (ref 7–30)
CALCIUM SERPL-MCNC: 8.3 MG/DL (ref 8.5–10.1)
CHLORIDE SERPL-SCNC: 110 MMOL/L (ref 94–109)
CO2 SERPL-SCNC: 28 MMOL/L (ref 20–32)
COPATH REPORT: NORMAL
CREAT SERPL-MCNC: 1.68 MG/DL (ref 0.52–1.04)
ERYTHROCYTE [DISTWIDTH] IN BLOOD BY AUTOMATED COUNT: 16.9 % (ref 10–15)
GFR SERPL CREATININE-BSD FRML MDRD: 29 ML/MIN/{1.73_M2}
GLUCOSE BLDC GLUCOMTR-MCNC: 132 MG/DL (ref 70–99)
GLUCOSE BLDC GLUCOMTR-MCNC: 135 MG/DL (ref 70–99)
GLUCOSE BLDC GLUCOMTR-MCNC: 145 MG/DL (ref 70–99)
GLUCOSE BLDC GLUCOMTR-MCNC: 149 MG/DL (ref 70–99)
GLUCOSE BLDC GLUCOMTR-MCNC: 155 MG/DL (ref 70–99)
GLUCOSE SERPL-MCNC: 131 MG/DL (ref 70–99)
HCO3 BLD-SCNC: 27 MMOL/L (ref 21–28)
HCO3 BLD-SCNC: NORMAL MMOL/L (ref 21–28)
HCO3 BLDV-SCNC: 29 MMOL/L (ref 21–28)
HCO3 BLDV-SCNC: 30 MMOL/L (ref 21–28)
HCT VFR BLD AUTO: 22.4 % (ref 35–47)
HGB BLD-MCNC: 7.6 G/DL (ref 11.7–15.7)
INR PPP: 2.25 (ref 0.86–1.14)
INTERPRETATION ECG - MUSE: NORMAL
MCH RBC QN AUTO: 29.3 PG (ref 26.5–33)
MCHC RBC AUTO-ENTMCNC: 33.9 G/DL (ref 31.5–36.5)
MCV RBC AUTO: 87 FL (ref 78–100)
O2/TOTAL GAS SETTING VFR VENT: ABNORMAL %
O2/TOTAL GAS SETTING VFR VENT: NORMAL %
OXYHGB MFR BLD: 96 % (ref 92–100)
OXYHGB MFR BLD: NORMAL % (ref 92–100)
OXYHGB MFR BLDV: 30 %
OXYHGB MFR BLDV: 60 %
PCO2 BLD: 32 MM HG (ref 35–45)
PCO2 BLD: NORMAL MM HG (ref 35–45)
PCO2 BLDV: 39 MM HG (ref 40–50)
PCO2 BLDV: 39 MM HG (ref 40–50)
PH BLD: 7.53 PH (ref 7.35–7.45)
PH BLD: NORMAL PH (ref 7.35–7.45)
PH BLDV: 7.48 PH (ref 7.32–7.43)
PH BLDV: 7.5 PH (ref 7.32–7.43)
PLATELET # BLD AUTO: 193 10E9/L (ref 150–450)
PO2 BLD: 68 MM HG (ref 80–105)
PO2 BLD: NORMAL MM HG (ref 80–105)
PO2 BLDV: 19 MM HG (ref 25–47)
PO2 BLDV: 31 MM HG (ref 25–47)
POTASSIUM SERPL-SCNC: 3.7 MMOL/L (ref 3.4–5.3)
RBC # BLD AUTO: 2.59 10E12/L (ref 3.8–5.2)
SODIUM SERPL-SCNC: 147 MMOL/L (ref 133–144)
VANCOMYCIN SERPL-MCNC: 18.7 MG/L
WBC # BLD AUTO: 12.1 10E9/L (ref 4–11)

## 2019-01-16 PROCEDURE — 25000132 ZZH RX MED GY IP 250 OP 250 PS 637: Performed by: THORACIC SURGERY (CARDIOTHORACIC VASCULAR SURGERY)

## 2019-01-16 PROCEDURE — 71045 X-RAY EXAM CHEST 1 VIEW: CPT

## 2019-01-16 PROCEDURE — 25000125 ZZHC RX 250: Performed by: SURGERY

## 2019-01-16 PROCEDURE — 25000128 H RX IP 250 OP 636

## 2019-01-16 PROCEDURE — 82805 BLOOD GASES W/O2 SATURATION: CPT | Performed by: NURSE PRACTITIONER

## 2019-01-16 PROCEDURE — 00000146 ZZHCL STATISTIC GLUCOSE BY METER IP

## 2019-01-16 PROCEDURE — 25000128 H RX IP 250 OP 636: Performed by: STUDENT IN AN ORGANIZED HEALTH CARE EDUCATION/TRAINING PROGRAM

## 2019-01-16 PROCEDURE — 25000125 ZZHC RX 250: Performed by: NURSE PRACTITIONER

## 2019-01-16 PROCEDURE — 40000275 ZZH STATISTIC RCP TIME EA 10 MIN

## 2019-01-16 PROCEDURE — 99291 CRITICAL CARE FIRST HOUR: CPT | Performed by: NURSE PRACTITIONER

## 2019-01-16 PROCEDURE — 80202 ASSAY OF VANCOMYCIN: CPT | Performed by: PHYSICIAN ASSISTANT

## 2019-01-16 PROCEDURE — 25000128 H RX IP 250 OP 636: Performed by: THORACIC SURGERY (CARDIOTHORACIC VASCULAR SURGERY)

## 2019-01-16 PROCEDURE — 25000128 H RX IP 250 OP 636: Performed by: SURGERY

## 2019-01-16 PROCEDURE — 94640 AIRWAY INHALATION TREATMENT: CPT

## 2019-01-16 PROCEDURE — 94640 AIRWAY INHALATION TREATMENT: CPT | Mod: 76

## 2019-01-16 PROCEDURE — 25000125 ZZHC RX 250

## 2019-01-16 PROCEDURE — 82805 BLOOD GASES W/O2 SATURATION: CPT | Performed by: THORACIC SURGERY (CARDIOTHORACIC VASCULAR SURGERY)

## 2019-01-16 PROCEDURE — 40000008 ZZH STATISTIC AIRWAY CARE

## 2019-01-16 PROCEDURE — 36600 WITHDRAWAL OF ARTERIAL BLOOD: CPT

## 2019-01-16 PROCEDURE — 25000132 ZZH RX MED GY IP 250 OP 250 PS 637: Performed by: INTERNAL MEDICINE

## 2019-01-16 PROCEDURE — 80048 BASIC METABOLIC PNL TOTAL CA: CPT | Performed by: PHYSICIAN ASSISTANT

## 2019-01-16 PROCEDURE — 25000128 H RX IP 250 OP 636: Performed by: NURSE PRACTITIONER

## 2019-01-16 PROCEDURE — 85610 PROTHROMBIN TIME: CPT | Performed by: PHYSICIAN ASSISTANT

## 2019-01-16 PROCEDURE — 25000132 ZZH RX MED GY IP 250 OP 250 PS 637: Performed by: STUDENT IN AN ORGANIZED HEALTH CARE EDUCATION/TRAINING PROGRAM

## 2019-01-16 PROCEDURE — 25000132 ZZH RX MED GY IP 250 OP 250 PS 637

## 2019-01-16 PROCEDURE — 85027 COMPLETE CBC AUTOMATED: CPT | Performed by: PHYSICIAN ASSISTANT

## 2019-01-16 PROCEDURE — P9047 ALBUMIN (HUMAN), 25%, 50ML: HCPCS | Performed by: NURSE PRACTITIONER

## 2019-01-16 PROCEDURE — 20000003 ZZH R&B ICU

## 2019-01-16 PROCEDURE — 94003 VENT MGMT INPAT SUBQ DAY: CPT

## 2019-01-16 PROCEDURE — 25000132 ZZH RX MED GY IP 250 OP 250 PS 637: Performed by: NURSE PRACTITIONER

## 2019-01-16 RX ORDER — QUETIAPINE FUMARATE 25 MG/1
25 TABLET, FILM COATED ORAL 2 TIMES DAILY
Status: DISCONTINUED | OUTPATIENT
Start: 2019-01-16 | End: 2019-01-18

## 2019-01-16 RX ORDER — WARFARIN SODIUM 1 MG/1
1 TABLET ORAL
Status: COMPLETED | OUTPATIENT
Start: 2019-01-16 | End: 2019-01-16

## 2019-01-16 RX ADMIN — Medication 50 MCG/HR: at 13:28

## 2019-01-16 RX ADMIN — METOPROLOL TARTRATE 2.5 MG: 5 INJECTION, SOLUTION INTRAVENOUS at 22:05

## 2019-01-16 RX ADMIN — ALBUMIN HUMAN 25 G: 0.25 SOLUTION INTRAVENOUS at 05:08

## 2019-01-16 RX ADMIN — NYSTATIN 500000 UNITS: 500000 SUSPENSION ORAL at 08:41

## 2019-01-16 RX ADMIN — IPRATROPIUM BROMIDE AND ALBUTEROL SULFATE 3 ML: .5; 2.5 SOLUTION RESPIRATORY (INHALATION) at 15:12

## 2019-01-16 RX ADMIN — QUETIAPINE 25 MG: 25 TABLET ORAL at 20:49

## 2019-01-16 RX ADMIN — MEROPENEM 500 MG: 500 INJECTION, POWDER, FOR SOLUTION INTRAVENOUS at 02:02

## 2019-01-16 RX ADMIN — NYSTATIN 500000 UNITS: 500000 SUSPENSION ORAL at 12:26

## 2019-01-16 RX ADMIN — ACETAMINOPHEN 650 MG: 325 TABLET, FILM COATED ORAL at 08:38

## 2019-01-16 RX ADMIN — MULTIVITAMIN 15 ML: LIQUID ORAL at 08:38

## 2019-01-16 RX ADMIN — METOPROLOL TARTRATE 2.5 MG: 5 INJECTION, SOLUTION INTRAVENOUS at 04:08

## 2019-01-16 RX ADMIN — IPRATROPIUM BROMIDE AND ALBUTEROL SULFATE 3 ML: .5; 2.5 SOLUTION RESPIRATORY (INHALATION) at 06:58

## 2019-01-16 RX ADMIN — PREDNISONE 40 MG: 20 TABLET ORAL at 08:40

## 2019-01-16 RX ADMIN — DEXMEDETOMIDINE HYDROCHLORIDE 0.4 MCG/KG/HR: 100 INJECTION, SOLUTION INTRAVENOUS at 12:38

## 2019-01-16 RX ADMIN — WARFARIN SODIUM 1 MG: 1 TABLET ORAL at 19:01

## 2019-01-16 RX ADMIN — IPRATROPIUM BROMIDE AND ALBUTEROL SULFATE 3 ML: .5; 2.5 SOLUTION RESPIRATORY (INHALATION) at 19:31

## 2019-01-16 RX ADMIN — INSULIN ASPART 1 UNITS: 100 INJECTION, SOLUTION INTRAVENOUS; SUBCUTANEOUS at 20:48

## 2019-01-16 RX ADMIN — MEROPENEM 500 MG: 500 INJECTION, POWDER, FOR SOLUTION INTRAVENOUS at 08:42

## 2019-01-16 RX ADMIN — SODIUM CHLORIDE: 9 INJECTION, SOLUTION INTRAVENOUS at 20:50

## 2019-01-16 RX ADMIN — OXYCODONE HYDROCHLORIDE 5 MG: 5 TABLET ORAL at 19:37

## 2019-01-16 RX ADMIN — VANCOMYCIN HYDROCHLORIDE 2000 MG: 5 INJECTION, POWDER, LYOPHILIZED, FOR SOLUTION INTRAVENOUS at 08:42

## 2019-01-16 RX ADMIN — IPRATROPIUM BROMIDE AND ALBUTEROL SULFATE 3 ML: .5; 2.5 SOLUTION RESPIRATORY (INHALATION) at 10:13

## 2019-01-16 RX ADMIN — ASPIRIN 81 MG 81 MG: 81 TABLET ORAL at 08:42

## 2019-01-16 RX ADMIN — OXYCODONE HYDROCHLORIDE 5 MG: 5 TABLET ORAL at 08:40

## 2019-01-16 RX ADMIN — INSULIN ASPART 1 UNITS: 100 INJECTION, SOLUTION INTRAVENOUS; SUBCUTANEOUS at 16:52

## 2019-01-16 RX ADMIN — POTASSIUM CHLORIDE 20 MEQ: 400 INJECTION, SOLUTION INTRAVENOUS at 12:22

## 2019-01-16 RX ADMIN — CHLORHEXIDINE GLUCONATE 15 ML: 1.2 RINSE ORAL at 20:48

## 2019-01-16 RX ADMIN — QUETIAPINE 25 MG: 25 TABLET ORAL at 12:22

## 2019-01-16 RX ADMIN — OXYCODONE HYDROCHLORIDE 5 MG: 5 TABLET ORAL at 12:26

## 2019-01-16 RX ADMIN — NYSTATIN 500000 UNITS: 500000 SUSPENSION ORAL at 19:01

## 2019-01-16 RX ADMIN — NYSTATIN 500000 UNITS: 500000 SUSPENSION ORAL at 21:38

## 2019-01-16 RX ADMIN — SODIUM CHLORIDE: 9 INJECTION, SOLUTION INTRAVENOUS at 19:28

## 2019-01-16 RX ADMIN — MEROPENEM 500 MG: 500 INJECTION, POWDER, FOR SOLUTION INTRAVENOUS at 16:52

## 2019-01-16 RX ADMIN — HEPARIN SODIUM 5000 UNITS: 10000 INJECTION, SOLUTION INTRAVENOUS; SUBCUTANEOUS at 04:09

## 2019-01-16 RX ADMIN — DEXMEDETOMIDINE HYDROCHLORIDE 0.4 MCG/KG/HR: 100 INJECTION, SOLUTION INTRAVENOUS at 22:05

## 2019-01-16 RX ADMIN — Medication 40 MG: at 07:47

## 2019-01-16 RX ADMIN — FENTANYL CITRATE 100 MCG: 50 INJECTION, SOLUTION INTRAMUSCULAR; INTRAVENOUS at 02:07

## 2019-01-16 RX ADMIN — CHLORHEXIDINE GLUCONATE 15 ML: 1.2 RINSE ORAL at 08:42

## 2019-01-16 ASSESSMENT — ACTIVITIES OF DAILY LIVING (ADL)
ADLS_ACUITY_SCORE: 17
ADLS_ACUITY_SCORE: 19
ADLS_ACUITY_SCORE: 17
ADLS_ACUITY_SCORE: 19
ADLS_ACUITY_SCORE: 17
ADLS_ACUITY_SCORE: 19

## 2019-01-16 ASSESSMENT — MIFFLIN-ST. JEOR: SCORE: 1395.8

## 2019-01-16 NOTE — PLAN OF CARE
Levo gtt off. VSS. Tolerating vent and TF. Na levels remain high at 147, Q 2 hour flushes. Pain med requested once, pt restless nurse asked if in pain and pt shook head yes. Will continue to monitor.

## 2019-01-16 NOTE — PROGRESS NOTES
Renal Medicine Progress Note    SHORTHAND KEY FOR MY NOTES:  c = with, s = without, p = after, a = before, x = except, asx = asymptomatic, tx = transplant or treatment, sx = symptoms or symptomatic, cx = canceled or culture, rxn = reaction, yday = yesterday, nl = normal, abx = antibiotics, fxn = function, dx = diagnosis, dz = disease, m/h = melena/hematochezia, c/d/l/ha = cramping/dizziness/lightheadedness/headache, d/c = discharge or diarrhea/constipation, f/c/n/v = fevers/chills/nausea/vomiting, cp/sob = chest pain/shortness of breath.         Assessment/Plan:     1.  Non-oliguric IMAN/CKD.  Pt's cr is a bit lower today and uo remains ok.  Tolerating the scheduled alb.  Still TBV up, but intravascularly may be approaching euvolemia.  She is still alkalotic, so will not diurese, yet.  A.  Follow labs, uo.  B.  Continue to hold diuretics.  Will reassess tmrw.  C.  When we do start diuretics, would combine c IV alb to help mobilize fluid.    2.  S/p aortic dissection/repair.  Stable.  A.  Per CV Surg.    3.  Resp failure/possible pneumonia.  Pt is on the vent.  Remains on broad abx.  WBC is steadily improving.    A.  Weaning per ICU.    4.  Anemia. Hb continues to trend down.  The higher values in the last couple of days were prob from hemoconcentration.  No obv bleeding noted.  A.  Follow labs.  B.  Transfuse for hb ~7.     5.  FEN.   Na is still high, but better.  She still has an ongoing free water deficit.  Other electrolytes are ok.  A.  Follow Na.  B.  Continue free water - 200 q 2h.        Interval History:     Unable.  Pt is intubated/sedated.          Medications and Allergies:       aspirin  81 mg Per Feeding Tube Daily     chlorhexidine  15 mL Swish & Spit BID     influenza Vac Split High-Dose  0.5 mL Intramuscular Prior to discharge     insulin aspart  1-6 Units Subcutaneous Q4H     ipratropium - albuterol 0.5 mg/2.5 mg/3 mL  3 mL Nebulization 4x daily     lidocaine (PF)  10 mL Subcutaneous Once      "meropenem  500 mg Intravenous Q8H     metoprolol  2.5 mg Intravenous Q6H     multivitamins w/minerals  15 mL Per Feeding Tube Daily     nystatin  500,000 Units Oral 4x Daily     pantoprazole  40 mg Oral QAM AC     predniSONE  40 mg Oral Daily     QUEtiapine  25 mg Oral BID     sodium chloride (PF)  10 mL Intracatheter Q8H     sodium chloride (PF)  3 mL Intracatheter Q8H     sodium phosphate  1 enema Rectal Once     warfarin  1 mg Oral ONCE at 18:00     Allergies   Allergen Reactions     Amoxicillin Swelling     Ciprofloxacin Swelling          Physical Exam:     Vitals were reviewed  Heart Rate: 80, Blood pressure 111/67, pulse 80, temperature 97.9  F (36.6  C), resp. rate 18, height 1.626 m (5' 4\"), weight 92.1 kg (203 lb), SpO2 91 %.  Wt Readings from Last 3 Encounters:   01/16/19 92.1 kg (203 lb)   09/14/08 80.7 kg (178 lb)     Intake/Output Summary (Last 24 hours) at 1/16/2019 1216  Last data filed at 1/16/2019 1100  Gross per 24 hour   Intake 3907.99 ml   Output 760 ml   Net 3147.99 ml     GENERAL APPEARANCE: intubated, sedated  HEENT:  Eyes/ears/nose/neck grossly normal x +OG  RESP: + vent sounds, no obv crackles or wheezes today  CV: RRR, nl S1/S2, + pacer   ABDOMEN: o/s/nt/nd  EXTREMITIES/SKIN: 1+ edema  NEURO:  sedated  LINES:  + coello c yellow urine; + RIJ 3-lumen         Data:     CBC RESULTS:     Recent Labs   Lab 01/16/19  0520 01/15/19  0446 01/14/19  0406 01/13/19  0600 01/12/19  2225 01/12/19  1727   WBC 12.1* 16.7* 27.3* 21.6* 19.7* 22.9*   RBC 2.59* 2.80* 3.26* 2.75* 2.74* 2.98*   HGB 7.6* 8.3* 9.7* 8.1* 8.0* 8.7*   HCT 22.4* 24.1* 27.6* 23.1* 22.9* 24.9*    237 250 189 176 200     Basic Metabolic Panel:  Recent Labs   Lab 01/16/19  0520 01/15/19  0955 01/15/19  0446 01/14/19  0406 01/14/19  0008 01/13/19  1950 01/13/19  0600 01/12/19  2225   *  --  149* 148*  --  150* 149* 146*   POTASSIUM 3.7 3.6 3.1* 3.8 3.6 3.1* 3.5 3.5   CHLORIDE 110*  --  110* 110*  --  111* 112* 111*   CO2 28  -- "  30 26  --  28 23 20   BUN 60*  --  59* 63*  --  63* 61* 61*   CR 1.68*  --  1.95* 1.95*  --  1.84* 1.79* 1.74*   *  --  115* 116*  --  111* 98 101*   BESS 8.3*  --  8.4* 8.2*  --  8.0* 7.8* 7.3*     INR  Recent Labs   Lab 01/16/19  0520 01/15/19  0446 01/14/19  0406 01/13/19  0600   INR 2.25* 1.89* 1.58* 1.59*      Attestation:   I have reviewed today's relevant vital signs, notes, medications, labs and imaging.    Osman Madrid MD  [facility] - Nephrology  [phone]

## 2019-01-16 NOTE — PROGRESS NOTES
[facility]  Cardiovascular and Thoracic Surgery Daily Note          Assessment and Plan:   POD#12 s/p Left groin cutdown with exposure of the femoral vessels with arterial cannulation for cardiopulmonary bypass. Right groin cutdown with exposure of the femoral vessels with the right femoral artery ultimately used for diagnostic angiography. 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 Visceral angiography. Repair of the bilateral femoral vessels. By Dr. Joes Winslow.    Main Plans for today:  1. PS trial per ccare  2. Work with therapies    CVS:   -140, on/off norepi overnight - currently off; HR 80/ 100% VVI paced;  Echo on 1/7/29 demonstrated an LVEF 55-60%  On ASA, BB   QUINTEN s/p AVN ablation and PPM placement on 1/11/19. On oral amiodarone and coumadin with no bridging. Appreciate EP recs.  INR 2.25 today  Pulm:  Extubated per protocol and reintubated on POD #8 for resp distress.  CT chest on 1/14 demonstrates RLL compressive atelectasis 2/2 elevated hemidiaphragm (present preoperatively). CXR/CT chest with fluid in fissures. Diuresing with assistance of nephrology.  POD #7 left US guided thoracentesis with removal of 150 mL fluid.   Tolerating current vent settings: .4/500/18/7- PS trial this am - good volumes but tachypnea and unable to extubate.  Will check ABG today  Neuro:  Grossly intact, weakly follows commands. Pain managed with fentanyl drip and sedated with precedex drip.  Renal:  IMAN -BL Creat ~0.8, today 1.68; Preop weight 77.1 kg, today 92.1, up 1.5 kg overnight.   Renal US on 1/6 with patent flow in bilateral renal arteries. Right kidney atrophic.  Was on lasix drip over weekend with subsequent increase in creat. Renal re-consulted. Appreciate. Suspect patient is dry - currently on-off norepi with hemoconcentration on labwork on 1/14.   Given albumin yesterday per nephrology. U/O  adequate. Diuresis at [facility]  [facility] in AM  GI:  Microscopic colitis and diverticulosis on flex sig on 1/15.  Malnourished. Receiving tube feedings via OG. Appreciate GI consult. Started on daily prednisone.  Loose stools, flexiseal in place.  General surgery consulted over weekend to evaluate for ischemia/colitis. 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 No OR intervention necessary - surgery signed off.  CT abdomen on  does not demonstrate any acute pathology.    and  negative c. Diff.  :  Barrientos present - strict I/O, limited mobility; clear yellow UO  Endo:   Preop A1C 5.3; managed on insulin drip postop, currently well managed on sliding scale insulin. Anticipate hyperglycemia 2/2 steroid. Goal BG <180/  FEN:   Replace lytes as needed; Hypernatremic. Free water increased due to free water deficit.  Continue tube feeding.  ID:   Temp (24hrs), Av.9  F (36.6  C), Min:94.1  F (34.5  C), Max:99.9  F (37.7  C)  Stress induced leukocytosis: WBC 12.1  Tmax 99.9 overnight  Completed perioperative antibiotics, currently on Meropenem since  for worsening leukocytosis and hypotension.  Blood/sputum cultures. Sputum + candida- on nystatin. Follow blood cxs, NTD  Heme:   Acute blood loss anemia and thrombocytopenia: Hgb 7.6; Plt 193  Tubes/Drains:  Right internal jugular   OG  Barrientos  Rectal tube  Proph:   BB when able, statin, ASA, subcutaneous heparin, coumadin, PCD, PPI  Dispo:   Continue in ICU with close monitoring.  Appreciate consulting services.          Interval History:   Lying in bed on ventilator, PS trial. Follows commands. On/off norepi overnight.         Medications:       aspirin  81 mg Per Feeding Tube Daily     chlorhexidine  15 mL Swish & Spit BID     heparin  5,000 Units Subcutaneous Q8H     influenza Vac Split High-Dose  0.5 mL Intramuscular Prior to discharge     insulin aspart  1-6 Units Subcutaneous Q4H     ipratropium - albuterol 0.5 mg/2.5  "mg/3 mL  3 mL Nebulization 4x daily     lidocaine (PF)  10 mL Subcutaneous Once     meropenem  500 mg Intravenous Q8H     metoprolol  2.5 mg Intravenous Q6H     multivitamins w/minerals  15 mL Per Feeding Tube Daily     nystatin  500,000 Units Oral 4x Daily     pantoprazole  40 mg Oral QAM AC     predniSONE  40 mg Oral Daily     QUEtiapine  25 mg Oral BID     sodium chloride (PF)  10 mL Intracatheter Q8H     sodium chloride (PF)  3 mL Intracatheter Q8H     sodium phosphate  1 enema Rectal Once     warfarin  1 mg Oral ONCE at 18:00     acetaminophen, albuterol, IV fluid REPLACEMENT ONLY, IV fluid REPLACEMENT ONLY, glucose **OR** dextrose **OR** glucagon, fentaNYL, heparin lock flush, hydrALAZINE, lidocaine 4%, lidocaine 4%, lidocaine (buffered or not buffered), lidocaine (buffered or not buffered), magnesium sulfate, magnesium sulfate, meperidine, methocarbamol, naloxone, ondansetron **OR** ondansetron, oxyCODONE IR, oxyCODONE-acetaminophen, potassium chloride, potassium chloride with lidocaine, potassium chloride, potassium chloride, potassium chloride, sodium chloride (PF), sodium chloride (PF), sodium chloride (PF), [COMPLETED] sodium phosphate **FOLLOWED BY** sodium phosphate, sodium phosphate, sodium phosphate, sodium phosphate, sodium phosphate, Warfarin Therapy Reminder          Physical Exam:   Vitals were reviewed  Blood pressure 114/72, pulse 80, temperature 94.1  F (34.5  C), resp. rate 25, height 1.626 m (5' 4\"), weight 92.1 kg (203 lb), SpO2 97 %.  Rhythm: VVI paced at 80 bpm on bedside monitor.    Lungs: CTA with scattered wheezes. On ventilator    Cardiovascular: S1, S2, RRR, no m/r/g.     Abdomen: Distended/NT. + BS, OG in place, flexiseal in place    Extremeties: 2+ edema with 1+ palp pedal pulses    Incision(s): Sternal and right femoral incisions CDI    CT: N/A    Weight:   Vitals:    01/12/19 0600 01/13/19 0600 01/14/19 0500 01/15/19 0500   Weight: 92.3 kg (203 lb 7.8 oz) 92.5 kg (203 lb 14.8 oz) " 91.5 kg (201 lb 11.5 oz) 90.4 kg (199 lb 6.4 oz)    01/16/19 0400   Weight: 92.1 kg (203 lb)            Data:    All labs and imaging reviewed by me.  Labs:   Lab Results   Component Value Date    WBC 12.1 01/16/2019     Lab Results   Component Value Date    RBC 2.59 01/16/2019     Lab Results   Component Value Date    HGB 7.6 01/16/2019     Lab Results   Component Value Date    HCT 22.4 01/16/2019     No components found for: MCT  Lab Results   Component Value Date    MCV 87 01/16/2019     Lab Results   Component Value Date    MCH 29.3 01/16/2019     Lab Results   Component Value Date    MCHC 33.9 01/16/2019     Lab Results   Component Value Date    RDW 16.9 01/16/2019     Lab Results   Component Value Date     01/16/2019       Last Basic Metabolic Panel:  Lab Results   Component Value Date     01/16/2019      Lab Results   Component Value Date    POTASSIUM 3.7 01/16/2019     Lab Results   Component Value Date    CHLORIDE 110 01/16/2019     Lab Results   Component Value Date    BESS 8.3 01/16/2019     Lab Results   Component Value Date    CO2 28 01/16/2019     Lab Results   Component Value Date    BUN 60 01/16/2019     Lab Results   Component Value Date    CR 1.68 01/16/2019     Lab Results   Component Value Date     01/16/2019       CXR 1/16:  Pending read.  Clinical read elevated right hemidiaphragm, fluid in fissures.    GRZEGORZ García, CCRN-CSC  CV Surgery  [place] Pager: [phone]  Personal Pager: [phone]

## 2019-01-16 NOTE — PROGRESS NOTES
[facility] ICU RESPIRATORY NOTE  Date of Admission: 1/4/2019  Date of Intubation (most recent): 1/12/2019  Reason for Mechanical Ventilation: respiratory failure. Extubated 1/5 post cardiac surgery, then failed bipap and was re-intubated 1/12/2019  Number of Days on Mechanical Ventilation: 5   Met Criteria for Pressure Support Trial: yes  Length of Pressure Support Trial: 45 minutes on 5/5  Reason for Stopping Pressure Support Trial: Per MD request and ABG results.     Significant Events Today: None    ABG Results: 7.53/32/68/27/3.6    Plan:  Continue vent support, attempt wean again tomorrow.

## 2019-01-16 NOTE — PROGRESS NOTES
[facility]  Critical Care Service  Progress Note  Date of Service (when I saw the patient): 01/16/2019  Main Plans for Today    Prednisone for colitis x 5 days   PST and VBG drawn; remains hypoxemic/alkalotic   ABG   Stop Vancomycin   Continue to hold diuretic in setting of metabolic alkalosis  Start low dose Quetiapine      Assessment & Plan    Priya Funez is a 78 year old female with PMH of HTN admitted on 1/4/2019 with sudden onset of back pain found to have type A aortic dissection and sent for emergency aortic dissection repair with graft.  She was admitted to ICU for ongoing care.     1/5: did well overnight.  Epi weaned off; requiring low dose phenylephrine.  Hgb stable. Extubated in the afternoon.   1/6:poor inspiratory effort and weak cough through the day.  Placed on Bipap overnight.  NTG gtt for improved BP control.  Rigors and fever to 103  1/7: Fever resolved; stable respiratory status on HFNC.  SvO2 48; getting echocardiogram.  Normal cardiac output; EF 55-60%   1/8: Rapid A Fib  1/9: Titrating down FiO2; improved ABG and VBG   1/11-12, hypoxic requiring bipap with little reserve, low UO, fluid resuscitated  with albumin blood and lasix  1/13: re-intubated late on 1/12; significant metabolic acidosis, increased work of breathing      Neuro  1. Sedation   2. ICU delirium  3. Acute pain  Plan:  --Fentanyl drip and PRN acetaminophen  --Precedex as needed for sedation   -- Maintain circadian rhythm.  Lights on during the day.  Off at night, minimize cares at night.  OOB during the day.   --Up to chair daily   --Quetiapine 25mg BID      CV  1. S/p Type A aortic dissection repair 1/4 with Davis Little, and Michaela  2. HTN  3. Afib with RVR, uncontrolled with medications now s/p AV node ablation and pacemaker placement 1/11/19  4. Shock; unclear source hypovolemic vs septic; normal lactic acid but increasing leukocytosis and ongoing hypotension requiring vasopressor. Now resolved     Plan:  --CV Surgery and Vascular surgery primary   --Monitor hemodynamics.  Maintain SBP<130; MAP >65  --Noepi as needed to maintain SBP>65; off this AM and BP WNLs   --Cardiac meds per CV surgery, IV metoprolol if parameters met   --Echocardiogram 1/7; EF 50-55%; IVC not dilated, ventricles small   --started on Warfarin for Afib; no bridging.  INR therapeutic 1/16  --venous oxyhemoglobin 60      Resp:  1. Post operative ventilator management.   2. Acute hypoxemic /hypercapnic respiratory failure; re-intubated 1/12.    Plan:  -- Initially tolerated HFO2 then bipap then re-intubated 1/12 secondary to increased WOB and SBO.  Only mild hypoxia on ABG at time of re-intubation; had profound metabolic acidosis (now resolved) and work of breathing likely reflected attempt to compensate.     --scheduled duoneb; albuterol nebs PRN  -- CT CAP 1/14 with moderate amount of fluid in fissures, interstitial edema; paralyzed right hemidiaphragm   --PST daily; tolerated 5/5 for 30 minutes, though ABG remains hypoxemic.  Unable to extubate today.   --ABG and VBG; remains hypoxemic.           GI/Nutrition  1. Malnutrition, nia/pro deficits  2. Ongoing diarrhea; presumed colitis   Plan:  --Repeat c.diff due to ongoing leukocytosis; liquid stool. Negative     --GI consulted. Abdomin distended, abd flat plate shows edematous bowel pattern.  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 Lactic acid has been normal.  Flex sig performed 1/15; diverticulum look fine, mucosa inflamed/congested; favoring colitis. No ischemia noted.  Biopsy taken.  GI recommends Budesenide, 9mg daily. Unable to give via feeding tube. Started on 5 day course oral prednisone    -- General surgery consult to follow with us.  Gastroenterology consulted to evaluate for colitis   -- Continue tube feeds   --monitor pathology of biopsy results     Renal  1. IMAN; abdominal aortic dissection involving right renal artery.  Atrophic right kidney  with decreased flow. No prior hx.  Baseline creatinine appears to be 0.8;   2. Metabolic alkalosis  3. Hypophosphatemia   4. Metabolic acidosis; resolved   5. Hypernatremia  Plan:  --Nephrology consulted. Renogram 1/7.  Renal US 1/6 with flow present in both R/L renal arteries. Right kidney atrophic.    -- Lasix drip stopped this 1/14; patient requiring vasopressors due to hypotension following >24 hours of diuretic drip; worsening renal function; metabolic alkalosis.    -- Unclear picture of volume status; suspect patient is intravascularly dry. Has completed course of scheduled albumin.  Holding further diuretic in setting of metabolic alkalosis.      --Continue enteric free water at 100mL/hr      ID  1. Fevers (improved)  2. Leukocytosis (ongoing)  Plan:  --andriy-op Cefepime and Vancomycin; completed    -- Cefepime has been continued in setting of leukocytosis; completed 6 days.   --antibiotics broadened back to empiric vanco and millie on 1/12 for hypotension and worsening leukocytosis. No indication for vancomycin; no supporting data.  Vanco stopped. Monitor for 1 day and consider stopping Millie on 1/17    -- Blood cultures with no growth.  Urinalysis without concern for UTI; did not reflex to culture. Sputum culture with only candida      Endocrine  1. Stress Hyperglycemia; resolved   Plan:  --  BG check Q 4 hours. Medium resistance sliding scale insulin. Has not been requiring       Heme:  1. Acute blood loss anemia  2. Thrombocytopenia; resolved   3. Superficial thrombophlebitis in the right basilic vein   Plan:  -- Monitor hemoglobin. Transfuse to keep > 7.0  --Warfarin for A Fib; no bridging.  INR therapeutic. Subcutaneous heparin stopped.   --US lower extremities to assess for DVT; no evidence of DVT       MSK/Skin  1. Sternotomy   Plan:  -- Sternal precautions  -- Wound care per protocol      General cares:  DVT Prophylaxis: continue PCD's; Warfarin    GI Prophylaxis: PPI  Restraints: Restraints for medical  healing needed: YES   Family update by me today: Yes   Current lines are required for patient management  Access: JEANNE Farr  Time Spent on this Encounter   Billing:  I spent 50 minutes bedside and on the inpatient unit today managing the critical care of Priya Funez in relation to the issues listed in this note.    Interval History    Awake, light sedation, following commands and nodding to questions. Appears CAM-ICU positive. Tolerated PST with some tachypnea, suspected to be due to anxiety. VBG with low oxyhemoglobin/ABG with hypoxemia/metabolic alkalosis  Denies pain. Endorses feeling uncomfortable.  Family updated at bedside.     Physical Exam   Temp: 97.9  F (36.6  C) Temp src: Esophageal Temp  Min: 94.1  F (34.5  C)  Max: 99.9  F (37.7  C) BP: 100/64 Pulse: 80 Heart Rate: 80 Resp: 21 SpO2: 95 % O2 Device: Mechanical Ventilator    Vitals:    19 0500 01/15/19 0500 19 0400   Weight: 91.5 kg (201 lb 11.5 oz) 90.4 kg (199 lb 6.4 oz) 92.1 kg (203 lb)     I/O last 3 completed shifts:  In: 2784.74 [I.V.:864.74; NG/GT:1670]  Out: 1040 [Urine:1040]    GEN: intubated, mildly sedate, NAD   EYES: PERRL, 3+,  Anicteric sclera.   HEENT:  Normocephalic, atraumatic, trachea midline, ETT secure   CV: RRR, 100% V-paced, no gallops, rubs, or murmurs  PULM/CHEST: Clear anterior breath sounds bilaterally, no wheeze, symmetric chest rise  GI: distended, soft , tender to palpation in left lower quadrant   : coello catheter in place, urine yellow and clear  EXTREMITIES: ++ peripheral edema on right, 1+ on left, moving all extremities, peripheral pulses intact  NEURO: sedated, awakens to voice, follows commands    SKIN: warm and dry  Imaging personally reviewed:  CXR   EC% V-paced     Data   Recent Labs   Lab 19  0520 01/15/19  0955 01/15/19  0446 19  0406  19  0600   WBC 12.1*  --  16.7* 27.3*  --  21.6*   HGB 7.6*  --  8.3* 9.7*  --  8.1*   MCV 87  --  86 85  --  84      --  237 250  --  189   INR 2.25*  --  1.89* 1.58*  --  1.59*   *  --  149* 148*   < > 149*   POTASSIUM 3.7 3.6 3.1* 3.8   < > 3.5   CHLORIDE 110*  --  110* 110*   < > 112*   CO2 28  --  30 26   < > 23   BUN 60*  --  59* 63*   < > 61*   CR 1.68*  --  1.95* 1.95*   < > 1.79*   ANIONGAP 9  --  9 12   < > 14   BESS 8.3*  --  8.4* 8.2*   < > 7.8*   *  --  115* 116*   < > 98   ALBUMIN  --   --   --  2.4*  --  2.4*   PROTTOTAL  --   --   --  5.6*  --  5.1*   BILITOTAL  --   --   --  2.6*  --  2.2*   ALKPHOS  --   --   --  111  --  85   ALT  --   --   --  28  --  26   AST  --   --   --  40  --  33    < > = values in this interval not displayed.

## 2019-01-16 NOTE — PLAN OF CARE
CR/PT: Pt not appropriate for PT this am. Per RN pt did a weaning trial this am, was a bit anxious and now exhausted. Recommends trial later today, or tomorrow in order to allow the pt to rest.

## 2019-01-16 NOTE — PLAN OF CARE
Patient vss this shift. Levo off since 0600. Appears confused to situation. Anxious with weaning this morning. Will attempt to wean again this afternoon. Increasing TF-goal 35 (increase to 35 at 0300). Small amount in flexiseal, siginificantly improved from previous shift. Uop low, MDs aware, nephrology ok with this and will reassess tomorrow if ok to add diuretics. Up to chair x2 hours this afternoon. Family at bedside and updated on POC. Monitor closely.

## 2019-01-16 NOTE — PROGRESS NOTES
[facility] RESPIRATORY [facility]    Date of Admission: 1/4/2019  Date of Intubation (most recent): 1/12/19  Reason for Mechanical Ventilation: Respiratory failure  Number of Days on Mechanical Ventilation: 5    Ventilation Mode: CMV/AC  (Continuous Mandatory Ventilation/ Assist Control)  FiO2 (%): 40 %  Rate Set (breaths/minute): 18 breaths/min  Tidal Volume Set (mL): 500 mL  PEEP (cm H2O): 7 cmH2O  Oxygen Concentration (%): 40 %  Resp: 18      Significant Events Today:    ABG Results:    Recent Labs   Lab 01/13/19  0407 01/12/19  2135 01/12/19  1744 01/12/19  1140 01/09/19  1039   PH 7.44 7.39 7.41 7.36  --    PCO2 31* 30* 23* 22*  --    PO2 110* 77* 75* 63*  --    HCO3 21 18* 15* 13*  --    O2PER  --   --   --  50 96         Plan:    Continue ventilatory support.  Assess for SBT readiness daily.

## 2019-01-16 NOTE — PROVIDER NOTIFICATION
Notified NP about critical abg pao2 (was venous not arterial). Will redraw. Also notified her about low UOP. Watch for now, see what renal MD says when they round. Monitor closely.

## 2019-01-16 NOTE — PROGRESS NOTES
BRIEF NUTRITION NOTE:     Was asked by CNP in rounds to increase rate of TF regimen.   A full Nutrition Assessment was completed 1/12.  See note for details.     NEW FINDINGS:   Tube feeding was started 1/15 at 1600 at rate of 10 mL/hr  Stool: 115 x1 brown   Na: 147 (H), K+: 3.7 (WNL), BUN: 60 (H), Cr: 1.68 (H)  Pt will likely not be extubated today      INTERVENTIONS:  Enteral Nutrition   -Increase rate of Peptamen 1.5 from 10 mL/hr to 20 mL/hr = 720 kcals (12 kcal/kg), 33 gm pro (0.5 gm/kg), 370 mL H20, 90 gm CHO, no fiber  - After 12 hours of 20 mL/hr increase Peptamen 1.5 to 35 mL/hr (if TF tolerated at 20 mL/hr) = 1260 kcals (20 kcal/kg), 57 gm pro (0.9 gm/kg), 647 mL H20, 158 gm CHO, no fiber     RECOMMENDATIONS:  Recommend eventual goal TF Peptamen 1.5 at 50 mL/hr = 1800 kcals (29 kcal/kg), 82 gm pro (1.3 gm/kg), 924 mL H20, 226 gm CHO, no fiber.    Radha Villagomez   Dietetic Intern

## 2019-01-17 ENCOUNTER — DOCUMENTATION ONLY (OUTPATIENT)
Dept: CARDIOLOGY | Facility: CLINIC | Age: 79
End: 2019-01-17

## 2019-01-17 LAB
ANION GAP SERPL CALCULATED.3IONS-SCNC: 8 MMOL/L (ref 3–14)
BASE EXCESS BLDV CALC-SCNC: 3.6 MMOL/L
BASOPHILS # BLD AUTO: 0 10E9/L (ref 0–0.2)
BASOPHILS NFR BLD AUTO: 0.2 %
BUN SERPL-MCNC: 62 MG/DL (ref 7–30)
CALCIUM SERPL-MCNC: 8 MG/DL (ref 8.5–10.1)
CHLORIDE SERPL-SCNC: 107 MMOL/L (ref 94–109)
CO2 SERPL-SCNC: 28 MMOL/L (ref 20–32)
CREAT SERPL-MCNC: 1.68 MG/DL (ref 0.52–1.04)
DIFFERENTIAL METHOD BLD: ABNORMAL
EOSINOPHIL # BLD AUTO: 0 10E9/L (ref 0–0.7)
EOSINOPHIL NFR BLD AUTO: 0.1 %
ERYTHROCYTE [DISTWIDTH] IN BLOOD BY AUTOMATED COUNT: 17.7 % (ref 10–15)
GFR SERPL CREATININE-BSD FRML MDRD: 29 ML/MIN/{1.73_M2}
GLUCOSE BLDC GLUCOMTR-MCNC: 121 MG/DL (ref 70–99)
GLUCOSE BLDC GLUCOMTR-MCNC: 125 MG/DL (ref 70–99)
GLUCOSE BLDC GLUCOMTR-MCNC: 143 MG/DL (ref 70–99)
GLUCOSE BLDC GLUCOMTR-MCNC: 148 MG/DL (ref 70–99)
GLUCOSE BLDC GLUCOMTR-MCNC: 156 MG/DL (ref 70–99)
GLUCOSE BLDC GLUCOMTR-MCNC: 172 MG/DL (ref 70–99)
GLUCOSE BLDC GLUCOMTR-MCNC: 179 MG/DL (ref 70–99)
GLUCOSE SERPL-MCNC: 116 MG/DL (ref 70–99)
HCO3 BLDV-SCNC: 28 MMOL/L (ref 21–28)
HCT VFR BLD AUTO: 22.5 % (ref 35–47)
HGB BLD-MCNC: 7.6 G/DL (ref 11.7–15.7)
IMM GRANULOCYTES # BLD: 0.1 10E9/L (ref 0–0.4)
IMM GRANULOCYTES NFR BLD: 0.9 %
INR PPP: 2.04 (ref 0.86–1.14)
LYMPHOCYTES # BLD AUTO: 0.6 10E9/L (ref 0.8–5.3)
LYMPHOCYTES NFR BLD AUTO: 4.1 %
MCH RBC QN AUTO: 29.6 PG (ref 26.5–33)
MCHC RBC AUTO-ENTMCNC: 33.8 G/DL (ref 31.5–36.5)
MCV RBC AUTO: 88 FL (ref 78–100)
MONOCYTES # BLD AUTO: 0.7 10E9/L (ref 0–1.3)
MONOCYTES NFR BLD AUTO: 4.7 %
NEUTROPHILS # BLD AUTO: 13.4 10E9/L (ref 1.6–8.3)
NEUTROPHILS NFR BLD AUTO: 90 %
NRBC # BLD AUTO: 0 10*3/UL
NRBC BLD AUTO-RTO: 0 /100
OXYHGB MFR BLDV: 72 %
PCO2 BLDV: 42 MM HG (ref 40–50)
PH BLDV: 7.44 PH (ref 7.32–7.43)
PLATELET # BLD AUTO: 187 10E9/L (ref 150–450)
PO2 BLDV: 39 MM HG (ref 25–47)
POTASSIUM SERPL-SCNC: 3.9 MMOL/L (ref 3.4–5.3)
RBC # BLD AUTO: 2.57 10E12/L (ref 3.8–5.2)
SODIUM SERPL-SCNC: 143 MMOL/L (ref 133–144)
WBC # BLD AUTO: 14.9 10E9/L (ref 4–11)

## 2019-01-17 PROCEDURE — 25000128 H RX IP 250 OP 636: Performed by: INTERNAL MEDICINE

## 2019-01-17 PROCEDURE — 80048 BASIC METABOLIC PNL TOTAL CA: CPT | Performed by: PHYSICIAN ASSISTANT

## 2019-01-17 PROCEDURE — 25000132 ZZH RX MED GY IP 250 OP 250 PS 637: Performed by: THORACIC SURGERY (CARDIOTHORACIC VASCULAR SURGERY)

## 2019-01-17 PROCEDURE — P9047 ALBUMIN (HUMAN), 25%, 50ML: HCPCS | Performed by: INTERNAL MEDICINE

## 2019-01-17 PROCEDURE — 25000128 H RX IP 250 OP 636

## 2019-01-17 PROCEDURE — 25000132 ZZH RX MED GY IP 250 OP 250 PS 637: Performed by: STUDENT IN AN ORGANIZED HEALTH CARE EDUCATION/TRAINING PROGRAM

## 2019-01-17 PROCEDURE — 25000132 ZZH RX MED GY IP 250 OP 250 PS 637: Performed by: INTERNAL MEDICINE

## 2019-01-17 PROCEDURE — 40000275 ZZH STATISTIC RCP TIME EA 10 MIN

## 2019-01-17 PROCEDURE — 25000125 ZZHC RX 250: Performed by: NURSE PRACTITIONER

## 2019-01-17 PROCEDURE — 20000003 ZZH R&B ICU

## 2019-01-17 PROCEDURE — 25000128 H RX IP 250 OP 636: Performed by: THORACIC SURGERY (CARDIOTHORACIC VASCULAR SURGERY)

## 2019-01-17 PROCEDURE — 25000132 ZZH RX MED GY IP 250 OP 250 PS 637: Performed by: NURSE PRACTITIONER

## 2019-01-17 PROCEDURE — 27210437 ZZH NUTRITION PRODUCT SEMIELEM INTERMED LITER

## 2019-01-17 PROCEDURE — 85610 PROTHROMBIN TIME: CPT | Performed by: PHYSICIAN ASSISTANT

## 2019-01-17 PROCEDURE — 25000125 ZZHC RX 250

## 2019-01-17 PROCEDURE — 99291 CRITICAL CARE FIRST HOUR: CPT | Performed by: NURSE PRACTITIONER

## 2019-01-17 PROCEDURE — 85025 COMPLETE CBC W/AUTO DIFF WBC: CPT | Performed by: PHYSICIAN ASSISTANT

## 2019-01-17 PROCEDURE — 94640 AIRWAY INHALATION TREATMENT: CPT

## 2019-01-17 PROCEDURE — 25000132 ZZH RX MED GY IP 250 OP 250 PS 637

## 2019-01-17 PROCEDURE — 94640 AIRWAY INHALATION TREATMENT: CPT | Mod: 76

## 2019-01-17 PROCEDURE — 00000146 ZZHCL STATISTIC GLUCOSE BY METER IP

## 2019-01-17 PROCEDURE — 94003 VENT MGMT INPAT SUBQ DAY: CPT

## 2019-01-17 PROCEDURE — 82805 BLOOD GASES W/O2 SATURATION: CPT | Performed by: NURSE PRACTITIONER

## 2019-01-17 RX ORDER — FUROSEMIDE 10 MG/ML
40 INJECTION INTRAMUSCULAR; INTRAVENOUS ONCE
Status: COMPLETED | OUTPATIENT
Start: 2019-01-17 | End: 2019-01-17

## 2019-01-17 RX ORDER — WARFARIN SODIUM 2.5 MG/1
2.5 TABLET ORAL
Status: COMPLETED | OUTPATIENT
Start: 2019-01-17 | End: 2019-01-17

## 2019-01-17 RX ORDER — ALBUMIN (HUMAN) 12.5 G/50ML
12.5 SOLUTION INTRAVENOUS ONCE
Status: COMPLETED | OUTPATIENT
Start: 2019-01-17 | End: 2019-01-17

## 2019-01-17 RX ORDER — HYDROMORPHONE HYDROCHLORIDE 1 MG/ML
.3-.5 INJECTION, SOLUTION INTRAMUSCULAR; INTRAVENOUS; SUBCUTANEOUS
Status: DISCONTINUED | OUTPATIENT
Start: 2019-01-17 | End: 2019-01-29 | Stop reason: HOSPADM

## 2019-01-17 RX ADMIN — MEROPENEM 500 MG: 500 INJECTION, POWDER, FOR SOLUTION INTRAVENOUS at 08:58

## 2019-01-17 RX ADMIN — NYSTATIN 500000 UNITS: 500000 SUSPENSION ORAL at 22:35

## 2019-01-17 RX ADMIN — OXYCODONE HYDROCHLORIDE 5 MG: 5 TABLET ORAL at 22:35

## 2019-01-17 RX ADMIN — METOPROLOL TARTRATE 2.5 MG: 5 INJECTION, SOLUTION INTRAVENOUS at 05:01

## 2019-01-17 RX ADMIN — METOPROLOL TARTRATE 2.5 MG: 5 INJECTION, SOLUTION INTRAVENOUS at 22:34

## 2019-01-17 RX ADMIN — FUROSEMIDE 40 MG: 10 INJECTION, SOLUTION INTRAVENOUS at 09:22

## 2019-01-17 RX ADMIN — Medication 40 MG: at 08:00

## 2019-01-17 RX ADMIN — CHLORHEXIDINE GLUCONATE 15 ML: 1.2 RINSE ORAL at 20:19

## 2019-01-17 RX ADMIN — OXYCODONE HYDROCHLORIDE 5 MG: 5 TABLET ORAL at 11:31

## 2019-01-17 RX ADMIN — METOPROLOL TARTRATE 2.5 MG: 5 INJECTION, SOLUTION INTRAVENOUS at 11:31

## 2019-01-17 RX ADMIN — INSULIN ASPART 1 UNITS: 100 INJECTION, SOLUTION INTRAVENOUS; SUBCUTANEOUS at 16:42

## 2019-01-17 RX ADMIN — INSULIN ASPART 1 UNITS: 100 INJECTION, SOLUTION INTRAVENOUS; SUBCUTANEOUS at 23:24

## 2019-01-17 RX ADMIN — WARFARIN SODIUM 2.5 MG: 2.5 TABLET ORAL at 18:30

## 2019-01-17 RX ADMIN — CHLORHEXIDINE GLUCONATE 15 ML: 1.2 RINSE ORAL at 08:08

## 2019-01-17 RX ADMIN — MULTIVITAMIN 15 ML: LIQUID ORAL at 08:07

## 2019-01-17 RX ADMIN — NYSTATIN 500000 UNITS: 500000 SUSPENSION ORAL at 08:00

## 2019-01-17 RX ADMIN — INSULIN ASPART 1 UNITS: 100 INJECTION, SOLUTION INTRAVENOUS; SUBCUTANEOUS at 20:18

## 2019-01-17 RX ADMIN — QUETIAPINE 25 MG: 25 TABLET ORAL at 08:00

## 2019-01-17 RX ADMIN — QUETIAPINE 25 MG: 25 TABLET ORAL at 20:17

## 2019-01-17 RX ADMIN — POTASSIUM CHLORIDE 20 MEQ: 1.5 POWDER, FOR SOLUTION ORAL at 06:38

## 2019-01-17 RX ADMIN — MEROPENEM 500 MG: 500 INJECTION, POWDER, FOR SOLUTION INTRAVENOUS at 17:02

## 2019-01-17 RX ADMIN — INSULIN ASPART 1 UNITS: 100 INJECTION, SOLUTION INTRAVENOUS; SUBCUTANEOUS at 12:12

## 2019-01-17 RX ADMIN — ACETAMINOPHEN 650 MG: 325 TABLET, FILM COATED ORAL at 11:31

## 2019-01-17 RX ADMIN — INSULIN ASPART 1 UNITS: 100 INJECTION, SOLUTION INTRAVENOUS; SUBCUTANEOUS at 00:04

## 2019-01-17 RX ADMIN — IPRATROPIUM BROMIDE AND ALBUTEROL SULFATE 3 ML: .5; 2.5 SOLUTION RESPIRATORY (INHALATION) at 07:07

## 2019-01-17 RX ADMIN — ASPIRIN 81 MG 81 MG: 81 TABLET ORAL at 08:00

## 2019-01-17 RX ADMIN — ACETAMINOPHEN 650 MG: 325 TABLET, FILM COATED ORAL at 17:02

## 2019-01-17 RX ADMIN — DEXMEDETOMIDINE HYDROCHLORIDE 0.3 MCG/KG/HR: 100 INJECTION, SOLUTION INTRAVENOUS at 23:24

## 2019-01-17 RX ADMIN — NYSTATIN 500000 UNITS: 500000 SUSPENSION ORAL at 13:08

## 2019-01-17 RX ADMIN — ACETAMINOPHEN 650 MG: 325 TABLET, FILM COATED ORAL at 22:35

## 2019-01-17 RX ADMIN — ALBUMIN HUMAN 12.5 G: 0.25 SOLUTION INTRAVENOUS at 08:02

## 2019-01-17 RX ADMIN — MEROPENEM 500 MG: 500 INJECTION, POWDER, FOR SOLUTION INTRAVENOUS at 00:19

## 2019-01-17 RX ADMIN — IPRATROPIUM BROMIDE AND ALBUTEROL SULFATE 3 ML: .5; 2.5 SOLUTION RESPIRATORY (INHALATION) at 15:16

## 2019-01-17 RX ADMIN — IPRATROPIUM BROMIDE AND ALBUTEROL SULFATE 3 ML: .5; 2.5 SOLUTION RESPIRATORY (INHALATION) at 19:11

## 2019-01-17 RX ADMIN — DEXMEDETOMIDINE HYDROCHLORIDE 0.3 MCG/KG/HR: 100 INJECTION, SOLUTION INTRAVENOUS at 20:16

## 2019-01-17 RX ADMIN — OXYCODONE HYDROCHLORIDE 5 MG: 5 TABLET ORAL at 02:58

## 2019-01-17 RX ADMIN — PREDNISONE 40 MG: 20 TABLET ORAL at 08:00

## 2019-01-17 RX ADMIN — NYSTATIN 500000 UNITS: 500000 SUSPENSION ORAL at 18:30

## 2019-01-17 RX ADMIN — IPRATROPIUM BROMIDE AND ALBUTEROL SULFATE 3 ML: .5; 2.5 SOLUTION RESPIRATORY (INHALATION) at 11:42

## 2019-01-17 ASSESSMENT — ACTIVITIES OF DAILY LIVING (ADL)
ADLS_ACUITY_SCORE: 17

## 2019-01-17 ASSESSMENT — MIFFLIN-ST. JEOR: SCORE: 1408

## 2019-01-17 NOTE — PROGRESS NOTES
Renal Medicine Progress Note    SHORTHAND KEY FOR MY NOTES:  c = with, s = without, p = after, a = before, x = except, asx = asymptomatic, tx = transplant or treatment, sx = symptoms or symptomatic, cx = canceled or culture, rxn = reaction, yday = yesterday, nl = normal, abx = antibiotics, fxn = function, dx = diagnosis, dz = disease, m/h = melena/hematochezia, c/d/l/ha = cramping/dizziness/lightheadedness/headache, d/c = discharge or diarrhea/constipation, f/c/n/v = fevers/chills/nausea/vomiting, cp/sob = chest pain/shortness of breath.         Assessment/Plan:     1.  Non-oliguric IMAN/CKD.  Pt's cr is stable ~1.7 p holding diuretics for a few days.  She is urinating ok, but less p stopping the IV alb.  She remains TBV up and wt is up a bit more, so will try to mobilize fluids c IV alb/furos combo x 1.  Will need to make sure we don't contract her intravascularly since she's still third-spacing quite a bit.  A.  IV alb 25% followed by furos 40 x 1.  B.  Will monitor response before scheduling more.  C.  Follow labs, uo, BP.      2.  S/p aortic dissection/repair.  Stable.  A.  Per CV Surg.    3.  Resp failure/possible pneumonia.  Pt remains on the vent at 40% FIO2.  She is on broad abx.  WBC is stable.      A.  Weaning per ICU.    4.  Anemia. Hb is stable overnight.  No signs of bleeding.    A.  Follow labs.  B.  Transfuse prn.     5.  FEN.   Na has improved.  She still has ongoing free water losses, and will lose more c the loop diuretic today.  A.  Continue free water 200 q 2h.  B.  Follow Na.        Interval History:     Unable.  Pt is intubated/sedated.          Medications and Allergies:       albumin human  12.5 g Intravenous Once     aspirin  81 mg Per Feeding Tube Daily     chlorhexidine  15 mL Swish & Spit BID     furosemide  40 mg Intravenous Once     influenza Vac Split High-Dose  0.5 mL Intramuscular Prior to discharge     insulin aspart  1-6 Units Subcutaneous Q4H     ipratropium - albuterol 0.5 mg/2.5  "mg/3 mL  3 mL Nebulization 4x daily     lidocaine (PF)  10 mL Subcutaneous Once     meropenem  500 mg Intravenous Q8H     metoprolol  2.5 mg Intravenous Q6H     multivitamins w/minerals  15 mL Per Feeding Tube Daily     nystatin  500,000 Units Oral 4x Daily     pantoprazole  40 mg Oral QAM AC     predniSONE  40 mg Oral Daily     QUEtiapine  25 mg Oral BID     sodium chloride (PF)  10 mL Intracatheter Q8H     sodium chloride (PF)  3 mL Intracatheter Q8H     sodium phosphate  1 enema Rectal Once     Allergies   Allergen Reactions     Amoxicillin Swelling     Ciprofloxacin Swelling          Physical Exam:     Vitals were reviewed  Heart Rate: 80, Blood pressure 97/61, pulse 80, temperature 96.4  F (35.8  C), resp. rate (!) 0, height 1.626 m (5' 4\"), weight 93.3 kg (205 lb 11 oz), SpO2 98 %.  Wt Readings from Last 3 Encounters:   01/17/19 93.3 kg (205 lb 11 oz)   09/14/08 80.7 kg (178 lb)     Intake/Output Summary (Last 24 hours) at 1/17/2019 0752  Last data filed at 1/17/2019 0600  Gross per 24 hour   Intake 3780.41 ml   Output 520 ml   Net 3260.41 ml     GENERAL APPEARANCE: intubated, sedated, but grimaces and eyes open to pain  HEENT:  Eyes/ears/nose/neck grossly normal x +OG  RESP: + vent sounds, + few wheezes  CV: RRR c 2/6 m, nl S1/S2, + pacer   ABDOMEN: o/s/nt/nd  EXTREMITIES/SKIN: 1+ edema  NEURO:  sedated  LINES:  + coello c yellow urine; + RIJ 3-lumen         Data:     CBC RESULTS:     Recent Labs   Lab 01/17/19  0530 01/16/19  0520 01/15/19  0446 01/14/19  0406 01/13/19  0600 01/12/19  2225   WBC 14.9* 12.1* 16.7* 27.3* 21.6* 19.7*   RBC 2.57* 2.59* 2.80* 3.26* 2.75* 2.74*   HGB 7.6* 7.6* 8.3* 9.7* 8.1* 8.0*   HCT 22.5* 22.4* 24.1* 27.6* 23.1* 22.9*    193 237 250 189 176     Basic Metabolic Panel:  Recent Labs   Lab 01/17/19  0530 01/16/19  0520 01/15/19  0955 01/15/19  0446 01/14/19  0406 01/14/19  0008 01/13/19  1950 01/13/19  0600    147*  --  149* 148*  --  150* 149*   POTASSIUM 3.9 3.7 3.6 " 3.1* 3.8 3.6 3.1* 3.5   CHLORIDE 107 110*  --  110* 110*  --  111* 112*   CO2 28 28  --  30 26  --  28 23   BUN 62* 60*  --  59* 63*  --  63* 61*   CR 1.68* 1.68*  --  1.95* 1.95*  --  1.84* 1.79*   * 131*  --  115* 116*  --  111* 98   BESS 8.0* 8.3*  --  8.4* 8.2*  --  8.0* 7.8*     INR  Recent Labs   Lab 01/17/19  0530 01/16/19  0520 01/15/19  0446 01/14/19  0406   INR 2.04* 2.25* 1.89* 1.58*      Attestation:   I have reviewed today's relevant vital signs, notes, medications, labs and imaging.    Osman Madrid MD  [facility] - Nephrology  [phone]

## 2019-01-17 NOTE — PROGRESS NOTES
[facility]  Cardiovascular and Thoracic Surgery Daily Note          Assessment and Plan:   POD#13 s/p Left groin cutdown with exposure of the femoral vessels with arterial cannulation for cardiopulmonary bypass. Right groin cutdown with exposure of the femoral vessels with the right femoral artery ultimately used for diagnostic angiography. 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 Visceral angiography. Repair of the bilateral femoral vessels. By Dr. Jose Winslow.    Main Plans for today:  1. PS trial per ccare  2. Work with therapies  3. Stop antibiotics  4. Diuresis at direction of nephrology - appreciate    CVS:   BP /50-60s, on/off norepi overnight - currently off; HR 80/ 100% VVI paced.  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 On oral amiodarone and coumadin with no bridging. Appreciate EP recs.  INR  2.04 today  Pulm:  Extubated per protocol and reintubated on POD #8 for resp distress.  CT chest on 1/14 demonstrates RLL compressive atelectasis 2/2 elevated hemidiaphragm (present preoperatively). CXR/CT chest with fluid in fissures. Diuresing with assistance of nephrology.  POD #7 left US guided thoracentesis with removal of 150 mL fluid.   Tolerating current vent settings: .4/500/18/7- PS trial again this am and recheck gas. Extubate if able.  Neuro:  Grossly intact, weakly follows commands. Pain managed with fentanyl drip and sedated with precedex drip. Will discontinue fentanyl drip and initial oral oxycodone down OG.  Renal:  IMAN -BL Creat ~0.8, 1.68 past two days; Preop weight 77.1 kg, today 93.3, up 1.2 kg overnight.   Renal US on 1/6 with patent flow in bilateral renal arteries. Right kidney atrophic.  Was on lasix drip over weekend with subsequent increase in creat. Renal re-consulted. Appreciate. Diuresis at  direction nephrology - 25% albumin with IV lasix today. UO adequate.  BMP in AM  GI:  Microscopic colitis and diverticulosis on flex sig on 1/15.  Malnourished. Receiving tube feedings via OG. Appreciate GI consult. Started on daily prednisone x5 days on .  Loose stools resolving, flexiseal removed.  General surgery consulted over weekend to evaluate for ischemia/colitis. 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 No OR intervention necessary - surgery signed off.  CT abdomen on  does not demonstrate any acute pathology.    and  negative c. Diff.  On PPI  :  Barrientos present - strict I/O, limited mobility  Endo:   Preop A1C 5.3; managed on sliding scale insulin. Anticipate hyperglycemia 2/2 steroid. Goal BG <180.  FEN:   Replace lytes as needed; Hypernatremic. Free water increased due to free water deficit.  Continue tube feeding.  ID:   Temp (24hrs), Av.2  F (36.8  C), Min:95.4  F (35.2  C), Max:99.3  F (37.4  C)  Stress and steroid induced leukocytosis: WBC 14.9 - was downtrending before initiation of prednisone on .  Antibiotics discontinued today. Sputum + candida - on nystatin. Follow blood cultures, NTD.  Heme:   Acute blood loss anemia and thrombocytopenia: Hgb 7.6; Plt 187  CBC in AM  Tubes/Drains:  Right internal jugular   OG  Barrientos  Proph:   BB when able, statin, ASA, subcutaneous heparin, coumadin, PCD, PPI  Dispo:   Continue in ICU with close monitoring  Appreciate consulting services          Interval History:   Sitting up in converter chair, PS trial. Follows commands. On/off norepi overnight.       Medications:       aspirin  81 mg Per Feeding Tube Daily     chlorhexidine  15 mL Swish & Spit BID     influenza Vac Split High-Dose  0.5 mL Intramuscular Prior to discharge     insulin aspart  1-6 Units Subcutaneous Q4H     ipratropium - albuterol 0.5 mg/2.5 mg/3 mL  3 mL Nebulization 4x daily     lidocaine (PF)  10 mL Subcutaneous Once     meropenem  500 mg  "Intravenous Q8H     metoprolol  2.5 mg Intravenous Q6H     multivitamins w/minerals  15 mL Per Feeding Tube Daily     nystatin  500,000 Units Oral 4x Daily     pantoprazole  40 mg Oral QAM AC     predniSONE  40 mg Oral Daily     QUEtiapine  25 mg Oral BID     sodium chloride (PF)  10 mL Intracatheter Q8H     sodium chloride (PF)  3 mL Intracatheter Q8H     sodium phosphate  1 enema Rectal Once     warfarin  2.5 mg Oral ONCE at 18:00     acetaminophen, albuterol, IV fluid REPLACEMENT ONLY, IV fluid REPLACEMENT ONLY, glucose **OR** dextrose **OR** glucagon, fentaNYL, heparin lock flush, hydrALAZINE, lidocaine 4%, lidocaine 4%, lidocaine (buffered or not buffered), lidocaine (buffered or not buffered), magnesium sulfate, magnesium sulfate, meperidine, methocarbamol, naloxone, ondansetron **OR** ondansetron, oxyCODONE IR, oxyCODONE-acetaminophen, potassium chloride, potassium chloride with lidocaine, potassium chloride, potassium chloride, potassium chloride, sodium chloride (PF), sodium chloride (PF), sodium chloride (PF), [COMPLETED] sodium phosphate **FOLLOWED BY** sodium phosphate, sodium phosphate, sodium phosphate, sodium phosphate, sodium phosphate, Warfarin Therapy Reminder          Physical Exam:   Vitals were reviewed  Blood pressure 101/59, pulse 80, temperature 99.3  F (37.4  C), resp. rate 19, height 1.626 m (5' 4\"), weight 93.3 kg (205 lb 11 oz), SpO2 96 %.  Rhythm: VVI paced at 80 bpm on bedside monitor    Lungs: Coarse throughout on ventilator.    Cardiovascular: S1, S2, RRR, no m/r/g.     Abdomen: Distended/NT. + BS, OG in place    Extremeties: 2+ edema with 1+ palp pedal pulses    Incision(s): Sternal and right femoral incisions CDI    CT: N/A    Weight:   Vitals:    01/13/19 0600 01/14/19 0500 01/15/19 0500 01/16/19 0400   Weight: 92.5 kg (203 lb 14.8 oz) 91.5 kg (201 lb 11.5 oz) 90.4 kg (199 lb 6.4 oz) 92.1 kg (203 lb)    01/17/19 0400   Weight: 93.3 kg (205 lb 11 oz)            Data:    All labs and " imaging reviewed by me.  Labs:   Lab Results   Component Value Date    WBC 14.9 01/17/2019     Lab Results   Component Value Date    RBC 2.57 01/17/2019     Lab Results   Component Value Date    HGB 7.6 01/17/2019     Lab Results   Component Value Date    HCT 22.5 01/17/2019     No components found for: MCT  Lab Results   Component Value Date    MCV 88 01/17/2019     Lab Results   Component Value Date    MCH 29.6 01/17/2019     Lab Results   Component Value Date    MCHC 33.8 01/17/2019     Lab Results   Component Value Date    RDW 17.7 01/17/2019     Lab Results   Component Value Date     01/17/2019       Last Basic Metabolic Panel:  Lab Results   Component Value Date     01/17/2019      Lab Results   Component Value Date    POTASSIUM 3.9 01/17/2019     Lab Results   Component Value Date    CHLORIDE 107 01/17/2019     Lab Results   Component Value Date    BESS 8.0 01/17/2019     Lab Results   Component Value Date    CO2 28 01/17/2019     Lab Results   Component Value Date    BUN 62 01/17/2019     Lab Results   Component Value Date    CR 1.68 01/17/2019     Lab Results   Component Value Date     01/17/2019         GRZEGORZ García, CCRN-CSC  CV Surgery  [place] Pager: [phone]  Personal Pager: [phone]    Patient seen and examined. Agree with plan.

## 2019-01-17 NOTE — PLAN OF CARE
Pt lethargic but arouses to voice. Follows commands. Moves all extremities.   Resp: pt remains on ventilatory support, precedex weaned slightly.   Cardio: levophed restarted to keep map> 65. Paced rhythm.   GI/: rectal tube removed due to low output. Decreased UOP that improved with the levophed.   Pts pain in control with continuous fentanyl and PRN oxycodone. Will continue to monitor.

## 2019-01-17 NOTE — PROGRESS NOTES
Date of Admission: 1/4/2019  Date of Intubation (most recent): 1/12/2019  Reason for Mechanical Ventilation: respiratory failure. Extubated 1/5 post cardiac surgery, then failed bipap and was re-intubated 1/12/2019  Number of Days on Mechanical Ventilation: 6  Reason for Stopping Pressure Support Trial: Per MD request     Ventilation Mode: CMV/AC  (Continuous Mandatory Ventilation/ Assist Control)  FiO2 (%): 40 %  Rate Set (breaths/minute): 18 breaths/min  Tidal Volume Set (mL): 500 mL  PEEP (cm H2O): 5 cmH2O  Pressure Support (cm H2O): 5 cmH2O  Oxygen Concentration (%): 40 %  Resp: 17    Recent Labs   Lab 01/16/19  1125 01/16/19  1000 01/13/19  0407 01/12/19  2135  01/12/19  1140   PH 7.53* Canceled, Test credited 7.44 7.39   < > 7.36   PCO2 32* Canceled, Test credited 31* 30*   < > 22*   PO2 68* Canceled, Test credited 110* 77*   < > 63*   HCO3 27 Canceled, Test credited 21 18*   < > 13*   O2PER 40% Canceled, Test credited  --   --   --  50    < > = values in this interval not displayed.      Plan: continue with full vent support.  Attempt for daily weaning as tolerated    Arturo Harley

## 2019-01-17 NOTE — PROGRESS NOTES
Pt was implanted on 1/11/19 with a [employer] single chamber pacemaker.  She is currently still in [facility].  Contacted Jan from [employer] and requested someone do the 7 day post implant check sometime tomorrow. Rad PAGE

## 2019-01-17 NOTE — PROGRESS NOTES
[facility]  Critical Care Service  Progress Note  Date of Service (when I saw the patient): 01/17/2019  Main Plans for Today    Lasix 40 mg x1   Albumin   Stop fentanyl  PO oxycodone and IV hydromorphone      Assessment & Plan    Priya Funez is a 78 year old female with PMH of HTN admitted on 1/4/2019 with sudden onset of back pain found to have type A aortic dissection and sent for emergency aortic dissection repair with graft.  She was admitted to ICU for ongoing care.     1/5: did well overnight.  Epi weaned off; requiring low dose phenylephrine.  Hgb stable. Extubated in the afternoon.   1/6:poor inspiratory effort and weak cough through the day.  Placed on Bipap overnight.  NTG gtt for improved BP control.  Rigors and fever to 103  1/7: Fever resolved; stable respiratory status on HFNC.  SvO2 48; getting echocardiogram.  Normal cardiac output; EF 55-60%   1/8: Rapid A Fib  1/9: Titrating down FiO2; improved ABG and VBG   1/11-12, hypoxic requiring bipap with little reserve, low UO, fluid resuscitated  with albumin blood and lasix  1/13: re-intubated late on 1/12; significant metabolic acidosis, increased work of breathing      Neuro  1. Sedation   2. ICU delirium  3. Acute pain  Plan:  -- Stop Fentanyl drip   --PRN acetaminophen, oxycodone, and IV hydromorphone .   --Precedex as needed for sedation   --Maintain circadian rhythm.  Lights on during the day.  Off at night, minimize cares at night.  OOB during the day.   --Up to chair daily   --Quetiapine 25mg BID      CV  1. S/p Type A aortic dissection repair 1/4 with Davis Little, and Michaela  2. HTN  3. Afib with RVR, uncontrolled with medications now s/p AV node ablation and pacemaker placement 1/11/19  4. Shock; unclear source hypovolemic vs septic; normal lactic acid but increasing leukocytosis and ongoing hypotension requiring vasopressor. Now resolved    Plan:  --CV Surgery and Vascular surgery primary   --Monitor hemodynamics.   Maintain SBP<130; MAP >65  --Noepi as needed to maintain SBP>65; On low dose this AM. Wean as able   --Cardiac meds per CV surgery, IV metoprolol if parameters met   --Echocardiogram 1/7; EF 50-55%; IVC not dilated, ventricles small   --started on Warfarin for Afib; no bridging.  INR therapeutic 1/16  --venous oxyhemoglobin 60      Resp:  1. Post operative ventilator management.   2. Acute hypoxemic /hypercapnic respiratory failure; re-intubated 1/12.    Plan:  -- Initially tolerated HFO2 then bipap then re-intubated 1/12 secondary to increased WOB and SBO.  Only mild hypoxia on ABG at time of re-intubation; had profound metabolic acidosis (now improved)   --scheduled duoneb; albuterol nebs PRN  -- CT CAP 1/14 with moderate amount of fluid in fissures, interstitial edema; paralyzed right hemidiaphragm   --PST daily; tolerated 5/5 for 30 minutes. Will obtain ABG will on PS if tolerated   -- VBG this AM, normal      GI/Nutrition  1. Malnutrition, nia/pro deficits  2. Ongoing diarrhea; presumed colitis.   Plan:  --Repeat c.diff Negative     --GI consulted. Abdomin distended, abd flat plate shows edematous bowel pattern.  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 Lactic acid has been normal.  Flex sig performed 1/15; diverticulum look fine, mucosa inflamed/congested; favoring colitis. No ischemia noted.  Biopsy showing-- Nonneoplastic colonic mucosa with microscopic lymphoid aggregate, no   evidence of microscopic colitis,   inflammatory bowel disease or malignancy  .  GI recommends Budesenide, 9mg daily. Unable to give via feeding tube. Started on 5 day course oral prednisone    -- General surgery consult to follow with us.  Gastroenterology consulted to evaluate for colitis   -- Continue tube feeds       Renal  1. IMAN; abdominal aortic dissection involving right renal artery.  Atrophic right kidney with decreased flow. No prior hx.  Baseline creatinine appears to be 0.8;   2. Metabolic  alkalosis  3. Hypophosphatemia   4. Metabolic acidosis; resolved   5. Hypernatremia  Plan:  --Nephrology consulted. Renogram 1/7.  Renal US 1/6 with flow present in both R/L renal arteries. Right kidney atrophic.    -- Lasix drip stopped this 1/14; patient requiring vasopressors due to hypotension following >24 hours of diuretic drip; worsening renal function; metabolic alkalosis.    -- Unclear picture of volume status; suspect patient is intravascularly dry. Has completed course of scheduled albumin.   --Continue enteric free water at 100mL/hr      ID  1. Fevers (improved)  2. Leukocytosis (imporoved)  Plan:  -- Brittany-op Cefepime and Vancomycin; completed    -- Cefepime completed 6 days.   --antibiotics broadened back to empiric vanco and millie on 1/12 for hypotension and worsening leukocytosis. No indication for vancomycin; no supporting data.  Vanco stopped. Stopped Millie today 1/17    -- Blood cultures with no growth.  Urinalysis without concern for UTI; did not reflex to culture. Sputum culture with only candida   -- Will likely see increase in WBC given steroid use. Will continue to monitor        Endocrine  1. Stress Hyperglycemia; resolved   Plan:  --  BG check Q 4 hours. Medium resistance sliding scale insulin. Has not been requiring       Heme:  1. Acute blood loss anemia  2. Thrombocytopenia; resolved   3. Superficial thrombophlebitis in the right basilic vein   Plan:  -- Monitor hemoglobin. Transfuse to keep > 7.0  --Warfarin for A Fib; no bridging.  INR therapeutic. Subcutaneous heparin stopped.   --US lower extremities to assess for DVT; no evidence of DVT       MSK/Skin  1. Sternotomy   Plan:  -- Sternal precautions  -- Wound care per protocol      General cares:  DVT Prophylaxis: continue PCD's; Warfarin    GI Prophylaxis: PPI  Restraints: Restraints for medical healing needed: YES   Family update by me today: Yes   Current lines are required for patient management  Access: JEANNE Holguin  Pals  Time Spent on this Encounter   Billing:  I spent 50 minutes bedside and on the inpatient unit today managing the critical care of Priya Funez in relation to the issues listed in this note.    Interval History    Awake, light sedation, following commands and nodding to questions. Appears CAM-ICU positive. Tolerated PST with some tachypnea, suspected to be due to anxiety. VBG with low oxyhemoglobin/ABG with hypoxemia/metabolic alkalosis  Denies pain. Endorses feeling uncomfortable.  Family updated at bedside.     Physical Exam   Temp: 98.3  F (36.8  C) Temp src: Axillary Temp  Min: 94.1  F (34.5  C)  Max: 99.1  F (37.3  C) BP: 94/58 Pulse: 80 Heart Rate: 80 Resp: 17 SpO2: 99 % O2 Device: Mechanical Ventilator    Vitals:    01/15/19 0500 19 0400 19 0400   Weight: 90.4 kg (199 lb 6.4 oz) 92.1 kg (203 lb) 93.3 kg (205 lb 11 oz)     I/O last 3 completed shifts:  In: 3780.41 [I.V.:1420.41; NG/GT:1960]  Out: 520 [Urine:470; Stool:50]    GEN: intubated, mildly sedate, NAD   EYES: PERRL, 3+,  Anicteric sclera.   HEENT:  Normocephalic, atraumatic, trachea midline, ETT secure   CV: RRR, 100% V-paced, no gallops, rubs, or murmurs  PULM/CHEST: Clear anterior breath sounds bilaterally, no wheeze, symmetric chest rise  GI: distended, soft , tender to palpation in left lower quadrant   : coello catheter in place, urine yellow and clear  EXTREMITIES: ++ peripheral edema on right, 1+ on left, moving all extremities, peripheral pulses intact  NEURO: sedated, awakens to voice, follows commands    SKIN: warm and dry  Imaging personally reviewed:  CXR   EC% V-paced     Data   Recent Labs   Lab 19  0530 19  0520 01/15/19  0955 01/15/19  0446 19  0406  19  0600   WBC 14.9* 12.1*  --  16.7* 27.3*  --  21.6*   HGB 7.6* 7.6*  --  8.3* 9.7*  --  8.1*   MCV 88 87  --  86 85  --  84    193  --  237 250  --  189   INR 2.04* 2.25*  --  1.89* 1.58*  --  1.59*    147*  --  149* 148*    < > 149*   POTASSIUM 3.9 3.7 3.6 3.1* 3.8   < > 3.5   CHLORIDE 107 110*  --  110* 110*   < > 112*   CO2 28 28  --  30 26   < > 23   BUN 62* 60*  --  59* 63*   < > 61*   CR 1.68* 1.68*  --  1.95* 1.95*   < > 1.79*   ANIONGAP 8 9  --  9 12   < > 14   BESS 8.0* 8.3*  --  8.4* 8.2*   < > 7.8*   * 131*  --  115* 116*   < > 98   ALBUMIN  --   --   --   --  2.4*  --  2.4*   PROTTOTAL  --   --   --   --  5.6*  --  5.1*   BILITOTAL  --   --   --   --  2.6*  --  2.2*   ALKPHOS  --   --   --   --  111  --  85   ALT  --   --   --   --  28  --  26   AST  --   --   --   --  40  --  33    < > = values in this interval not displayed.

## 2019-01-18 ENCOUNTER — APPOINTMENT (OUTPATIENT)
Dept: GENERAL RADIOLOGY | Facility: CLINIC | Age: 79
DRG: 003 | End: 2019-01-18
Payer: COMMERCIAL

## 2019-01-18 DIAGNOSIS — I49.5 SSS (SICK SINUS SYNDROME) (H): Primary | ICD-10-CM

## 2019-01-18 LAB
ANION GAP SERPL CALCULATED.3IONS-SCNC: 10 MMOL/L (ref 3–14)
BACTERIA SPEC CULT: NO GROWTH
BACTERIA SPEC CULT: NO GROWTH
BUN SERPL-MCNC: 69 MG/DL (ref 7–30)
CALCIUM SERPL-MCNC: 8.1 MG/DL (ref 8.5–10.1)
CHLORIDE SERPL-SCNC: 105 MMOL/L (ref 94–109)
CO2 SERPL-SCNC: 24 MMOL/L (ref 20–32)
CREAT SERPL-MCNC: 1.73 MG/DL (ref 0.52–1.04)
ERYTHROCYTE [DISTWIDTH] IN BLOOD BY AUTOMATED COUNT: 18.1 % (ref 10–15)
GFR SERPL CREATININE-BSD FRML MDRD: 28 ML/MIN/{1.73_M2}
GLUCOSE BLDC GLUCOMTR-MCNC: 113 MG/DL (ref 70–99)
GLUCOSE BLDC GLUCOMTR-MCNC: 120 MG/DL (ref 70–99)
GLUCOSE BLDC GLUCOMTR-MCNC: 126 MG/DL (ref 70–99)
GLUCOSE BLDC GLUCOMTR-MCNC: 128 MG/DL (ref 70–99)
GLUCOSE SERPL-MCNC: 120 MG/DL (ref 70–99)
HCT VFR BLD AUTO: 21.9 % (ref 35–47)
HGB BLD-MCNC: 7.2 G/DL (ref 11.7–15.7)
INR PPP: 1.79 (ref 0.86–1.14)
Lab: NORMAL
Lab: NORMAL
MAGNESIUM SERPL-MCNC: 2.5 MG/DL (ref 1.6–2.3)
MCH RBC QN AUTO: 29 PG (ref 26.5–33)
MCHC RBC AUTO-ENTMCNC: 32.9 G/DL (ref 31.5–36.5)
MCV RBC AUTO: 88 FL (ref 78–100)
PHOSPHATE SERPL-MCNC: 3.3 MG/DL (ref 2.5–4.5)
PLATELET # BLD AUTO: 193 10E9/L (ref 150–450)
PLATELET # BLD AUTO: NORMAL 10E9/L (ref 150–450)
POTASSIUM SERPL-SCNC: 4.4 MMOL/L (ref 3.4–5.3)
RBC # BLD AUTO: 2.48 10E12/L (ref 3.8–5.2)
SODIUM SERPL-SCNC: 139 MMOL/L (ref 133–144)
SPECIMEN SOURCE: NORMAL
SPECIMEN SOURCE: NORMAL
WBC # BLD AUTO: 12.5 10E9/L (ref 4–11)

## 2019-01-18 PROCEDURE — 25000128 H RX IP 250 OP 636: Performed by: THORACIC SURGERY (CARDIOTHORACIC VASCULAR SURGERY)

## 2019-01-18 PROCEDURE — 25000125 ZZHC RX 250: Performed by: NURSE PRACTITIONER

## 2019-01-18 PROCEDURE — 94640 AIRWAY INHALATION TREATMENT: CPT

## 2019-01-18 PROCEDURE — 83735 ASSAY OF MAGNESIUM: CPT | Performed by: STUDENT IN AN ORGANIZED HEALTH CARE EDUCATION/TRAINING PROGRAM

## 2019-01-18 PROCEDURE — 85027 COMPLETE CBC AUTOMATED: CPT | Performed by: PHYSICIAN ASSISTANT

## 2019-01-18 PROCEDURE — 40000986 XR ABDOMEN PORT 1 VW

## 2019-01-18 PROCEDURE — 00000146 ZZHCL STATISTIC GLUCOSE BY METER IP

## 2019-01-18 PROCEDURE — 94640 AIRWAY INHALATION TREATMENT: CPT | Mod: 76

## 2019-01-18 PROCEDURE — 25000132 ZZH RX MED GY IP 250 OP 250 PS 637: Performed by: INTERNAL MEDICINE

## 2019-01-18 PROCEDURE — 25000125 ZZHC RX 250

## 2019-01-18 PROCEDURE — 80048 BASIC METABOLIC PNL TOTAL CA: CPT | Performed by: STUDENT IN AN ORGANIZED HEALTH CARE EDUCATION/TRAINING PROGRAM

## 2019-01-18 PROCEDURE — 99291 CRITICAL CARE FIRST HOUR: CPT | Performed by: NURSE PRACTITIONER

## 2019-01-18 PROCEDURE — 20000003 ZZH R&B ICU

## 2019-01-18 PROCEDURE — 40000008 ZZH STATISTIC AIRWAY CARE

## 2019-01-18 PROCEDURE — 94003 VENT MGMT INPAT SUBQ DAY: CPT

## 2019-01-18 PROCEDURE — 84100 ASSAY OF PHOSPHORUS: CPT | Performed by: STUDENT IN AN ORGANIZED HEALTH CARE EDUCATION/TRAINING PROGRAM

## 2019-01-18 PROCEDURE — 25000132 ZZH RX MED GY IP 250 OP 250 PS 637: Performed by: STUDENT IN AN ORGANIZED HEALTH CARE EDUCATION/TRAINING PROGRAM

## 2019-01-18 PROCEDURE — 25000128 H RX IP 250 OP 636

## 2019-01-18 PROCEDURE — 25000132 ZZH RX MED GY IP 250 OP 250 PS 637: Performed by: NURSE PRACTITIONER

## 2019-01-18 PROCEDURE — 40000275 ZZH STATISTIC RCP TIME EA 10 MIN

## 2019-01-18 PROCEDURE — 25000132 ZZH RX MED GY IP 250 OP 250 PS 637

## 2019-01-18 PROCEDURE — 25000132 ZZH RX MED GY IP 250 OP 250 PS 637: Performed by: THORACIC SURGERY (CARDIOTHORACIC VASCULAR SURGERY)

## 2019-01-18 PROCEDURE — 85610 PROTHROMBIN TIME: CPT | Performed by: STUDENT IN AN ORGANIZED HEALTH CARE EDUCATION/TRAINING PROGRAM

## 2019-01-18 PROCEDURE — P9047 ALBUMIN (HUMAN), 25%, 50ML: HCPCS | Performed by: NURSE PRACTITIONER

## 2019-01-18 PROCEDURE — 25000128 H RX IP 250 OP 636: Performed by: NURSE PRACTITIONER

## 2019-01-18 RX ORDER — QUETIAPINE FUMARATE 25 MG/1
50 TABLET, FILM COATED ORAL 2 TIMES DAILY
Status: DISCONTINUED | OUTPATIENT
Start: 2019-01-18 | End: 2019-01-22

## 2019-01-18 RX ORDER — ALBUMIN (HUMAN) 12.5 G/50ML
25 SOLUTION INTRAVENOUS EVERY 12 HOURS
Status: COMPLETED | OUTPATIENT
Start: 2019-01-18 | End: 2019-01-18

## 2019-01-18 RX ORDER — WARFARIN SODIUM 2.5 MG/1
2.5 TABLET ORAL
Status: COMPLETED | OUTPATIENT
Start: 2019-01-18 | End: 2019-01-18

## 2019-01-18 RX ORDER — FUROSEMIDE 10 MG/ML
40 INJECTION INTRAMUSCULAR; INTRAVENOUS EVERY 12 HOURS
Status: COMPLETED | OUTPATIENT
Start: 2019-01-18 | End: 2019-01-19

## 2019-01-18 RX ORDER — METOCLOPRAMIDE HYDROCHLORIDE 5 MG/ML
10 INJECTION INTRAMUSCULAR; INTRAVENOUS ONCE
Status: COMPLETED | OUTPATIENT
Start: 2019-01-18 | End: 2019-01-18

## 2019-01-18 RX ADMIN — METOPROLOL TARTRATE 2.5 MG: 5 INJECTION, SOLUTION INTRAVENOUS at 11:25

## 2019-01-18 RX ADMIN — IPRATROPIUM BROMIDE AND ALBUTEROL SULFATE 3 ML: .5; 2.5 SOLUTION RESPIRATORY (INHALATION) at 14:24

## 2019-01-18 RX ADMIN — ALBUMIN HUMAN 25 G: 0.25 SOLUTION INTRAVENOUS at 23:58

## 2019-01-18 RX ADMIN — CHLORHEXIDINE GLUCONATE 15 ML: 1.2 RINSE ORAL at 08:52

## 2019-01-18 RX ADMIN — WARFARIN SODIUM 2.5 MG: 2.5 TABLET ORAL at 17:14

## 2019-01-18 RX ADMIN — IPRATROPIUM BROMIDE AND ALBUTEROL SULFATE 3 ML: .5; 2.5 SOLUTION RESPIRATORY (INHALATION) at 07:19

## 2019-01-18 RX ADMIN — IPRATROPIUM BROMIDE AND ALBUTEROL SULFATE 3 ML: .5; 2.5 SOLUTION RESPIRATORY (INHALATION) at 20:01

## 2019-01-18 RX ADMIN — QUETIAPINE 50 MG: 25 TABLET ORAL at 08:43

## 2019-01-18 RX ADMIN — Medication 40 MG: at 07:45

## 2019-01-18 RX ADMIN — ASPIRIN 81 MG 81 MG: 81 TABLET ORAL at 08:44

## 2019-01-18 RX ADMIN — METOCLOPRAMIDE 10 MG: 5 INJECTION, SOLUTION INTRAMUSCULAR; INTRAVENOUS at 11:26

## 2019-01-18 RX ADMIN — METOPROLOL TARTRATE 2.5 MG: 5 INJECTION, SOLUTION INTRAVENOUS at 16:29

## 2019-01-18 RX ADMIN — NYSTATIN 500000 UNITS: 500000 SUSPENSION ORAL at 23:59

## 2019-01-18 RX ADMIN — QUETIAPINE 50 MG: 25 TABLET ORAL at 20:02

## 2019-01-18 RX ADMIN — LIDOCAINE HYDROCHLORIDE: 20 JELLY TOPICAL at 11:26

## 2019-01-18 RX ADMIN — OXYCODONE HYDROCHLORIDE 5 MG: 5 TABLET ORAL at 07:45

## 2019-01-18 RX ADMIN — ACETAMINOPHEN 650 MG: 325 TABLET, FILM COATED ORAL at 20:02

## 2019-01-18 RX ADMIN — FUROSEMIDE 40 MG: 10 INJECTION, SOLUTION INTRAVENOUS at 13:08

## 2019-01-18 RX ADMIN — ACETAMINOPHEN 650 MG: 325 TABLET, FILM COATED ORAL at 07:45

## 2019-01-18 RX ADMIN — FUROSEMIDE 40 MG: 10 INJECTION, SOLUTION INTRAVENOUS at 23:59

## 2019-01-18 RX ADMIN — IPRATROPIUM BROMIDE AND ALBUTEROL SULFATE 3 ML: .5; 2.5 SOLUTION RESPIRATORY (INHALATION) at 11:05

## 2019-01-18 RX ADMIN — NYSTATIN 500000 UNITS: 500000 SUSPENSION ORAL at 08:43

## 2019-01-18 RX ADMIN — METOPROLOL TARTRATE 2.5 MG: 5 INJECTION, SOLUTION INTRAVENOUS at 21:58

## 2019-01-18 RX ADMIN — METOPROLOL TARTRATE 2.5 MG: 5 INJECTION, SOLUTION INTRAVENOUS at 04:17

## 2019-01-18 RX ADMIN — ALBUMIN HUMAN 25 G: 0.25 SOLUTION INTRAVENOUS at 13:07

## 2019-01-18 RX ADMIN — NYSTATIN 500000 UNITS: 500000 SUSPENSION ORAL at 17:14

## 2019-01-18 RX ADMIN — PREDNISONE 40 MG: 20 TABLET ORAL at 08:44

## 2019-01-18 RX ADMIN — MEROPENEM 500 MG: 500 INJECTION, POWDER, FOR SOLUTION INTRAVENOUS at 16:28

## 2019-01-18 RX ADMIN — MEROPENEM 500 MG: 500 INJECTION, POWDER, FOR SOLUTION INTRAVENOUS at 00:21

## 2019-01-18 RX ADMIN — MULTIVITAMIN 15 ML: LIQUID ORAL at 08:54

## 2019-01-18 RX ADMIN — OXYCODONE HYDROCHLORIDE 5 MG: 5 TABLET ORAL at 13:07

## 2019-01-18 RX ADMIN — OXYCODONE HYDROCHLORIDE 5 MG: 5 TABLET ORAL at 23:59

## 2019-01-18 RX ADMIN — HYDROMORPHONE HYDROCHLORIDE 0.3 MG: 1 INJECTION, SOLUTION INTRAMUSCULAR; INTRAVENOUS; SUBCUTANEOUS at 12:06

## 2019-01-18 RX ADMIN — DEXMEDETOMIDINE HYDROCHLORIDE 0.4 MCG/KG/HR: 100 INJECTION, SOLUTION INTRAVENOUS at 11:30

## 2019-01-18 RX ADMIN — NYSTATIN 500000 UNITS: 500000 SUSPENSION ORAL at 13:07

## 2019-01-18 RX ADMIN — MEROPENEM 500 MG: 500 INJECTION, POWDER, FOR SOLUTION INTRAVENOUS at 08:43

## 2019-01-18 ASSESSMENT — ACTIVITIES OF DAILY LIVING (ADL)
ADLS_ACUITY_SCORE: 17

## 2019-01-18 ASSESSMENT — MIFFLIN-ST. JEOR: SCORE: 1462

## 2019-01-18 NOTE — PROGRESS NOTES
Date of Admission: 1/4/2019  Date of Intubation (most recent): 1/12/2019  Reason for Mechanical Ventilation: respiratory failure. Extubated 1/5 post cardiac surgery, then failed bipap and was re-intubated 1/12/2019  Number of Days on Mechanical Ventilation: 7  Reason for Stopping Pressure Support Trial: Per MD request     Ventilation Mode: CMV/AC  (Continuous Mandatory Ventilation/ Assist Control)  FiO2 (%): 40 %  Rate Set (breaths/minute): 18 breaths/min  Tidal Volume Set (mL): 500 mL  PEEP (cm H2O): 5 cmH2O  Pressure Support (cm H2O): 5 cmH2O  Oxygen Concentration (%): 40 %  Resp: 18    Recent Labs   Lab 01/16/19  1125 01/16/19  1000 01/13/19  0407 01/12/19  2135  01/12/19  1140   PH 7.53* Canceled, Test credited 7.44 7.39   < > 7.36   PCO2 32* Canceled, Test credited 31* 30*   < > 22*   PO2 68* Canceled, Test credited 110* 77*   < > 63*   HCO3 27 Canceled, Test credited 21 18*   < > 13*   O2PER 40% Canceled, Test credited  --   --   --  50    < > = values in this interval not displayed.

## 2019-01-18 NOTE — PROGRESS NOTES
[facility]  Gastroenterology Progress Note     Priya Funez MRN# [id number]   YOB: 1940 Age: 78 year old          Assessment and Plan:     Ascending aortic dissection (H)    Diarrhea- Colonoscopy was suggestive of microscopic colitis. Pathology negative. Started on budesonide and has had improved symptoms. Will recommend patient continue on budesonide.         Dissecting aneurysm of thoracic aorta, [place] type A (H)      Interval History:   Intubated and sedated and doing well; no cp, sob, n/v/d, or abd pain.              Review of Systems:   C: NEGATIVE for fever, chills, change in weight  E/M: NEGATIVE for ear, mouth and throat problems  R: NEGATIVE for significant cough or SOB  CV: NEGATIVE for chest pain, palpitations or peripheral edema             Medications:   I have reviewed this patient's current medications    aspirin  81 mg Per Feeding Tube Daily     chlorhexidine  15 mL Swish & Spit BID     influenza Vac Split High-Dose  0.5 mL Intramuscular Prior to discharge     insulin aspart  1-6 Units Subcutaneous Q4H     ipratropium - albuterol 0.5 mg/2.5 mg/3 mL  3 mL Nebulization 4x daily     lidocaine (PF)  10 mL Subcutaneous Once     meropenem  500 mg Intravenous Q8H     metoprolol  2.5 mg Intravenous Q6H     multivitamins w/minerals  15 mL Per Feeding Tube Daily     nystatin  500,000 Units Oral 4x Daily     pantoprazole  40 mg Oral QAM AC     predniSONE  40 mg Oral Daily     QUEtiapine  50 mg Oral BID     sodium chloride (PF)  10 mL Intracatheter Q8H     sodium phosphate  1 enema Rectal Once     warfarin  2.5 mg Oral ONCE at 18:00                  Physical Exam:   Vitals were reviewed  Vital Signs with Ranges  Temp:  [94.64  F (34.8  C)-100.04  F (37.8  C)] 98.4  F (36.9  C)  Pulse:  [80] 80  Heart Rate:  [80] 80  Resp:  [7-36] 25  BP: ()/(48-93) 92/57  FiO2 (%):  [40 %] 40 %  SpO2:  [91 %-98 %] 93 %  I/O last 3 completed shifts:  In: 3971.2 [I.V.:671.2;  NG/GT:2460]  Out: 787 [Urine:787]  Constitutional: intubated and sedated  Cardiovascular: negative, PMI normal. No lifts, heaves, or thrills. RRR. No murmurs, clicks gallops or rub  Respiratory: negative, Percussion normal. Good diaphragmatic excursion. Lungs clear  Abdomen: Abdomen soft, non-tender. BS normal. No masses, organomegaly           Data:   I reviewed the patient's new clinical lab test results.   Recent Labs   Lab Test 01/18/19  0502 01/17/19  0530 01/16/19  0520   WBC 12.5* 14.9* 12.1*   HGB 7.2* 7.6* 7.6*   MCV 88 88 87     Canceled, Test credited 187 193   INR 1.79* 2.04* 2.25*     Recent Labs   Lab Test 01/18/19  0502 01/17/19  0530 01/16/19  0520   POTASSIUM 4.4 3.9 3.7   CHLORIDE 105 107 110*   CO2 24 28 28   BUN 69* 62* 60*   ANIONGAP 10 8 9     Recent Labs   Lab Test 01/14/19  0406 01/13/19  0600 01/12/19  2225  01/12/19  1124  01/06/19  1900  01/04/19  1400   ALBUMIN 2.4* 2.4* 2.7*   < >  --    < >  --    < > 3.6   BILITOTAL 2.6* 2.2* 2.3*   < >  --    < >  --    < > 1.3   ALT 28 26 26   < >  --    < >  --    < > 24   AST 40 33 41   < >  --    < >  --    < > 27   PROTEIN  --   --   --   --  30*  --  30*  --   --    LIPASE  --   --   --   --   --   --   --   --  145    < > = values in this interval not displayed.       I reviewed the patient's new imaging results.    All laboratory data reviewed  All imaging studies reviewed by me.    Shania Sun PA-C,  1/18/2019  [facility]  Office : [phone]  Cell: [phone] (Dr. Parks)  Cell: [phone] (Shania Sun PA-C)

## 2019-01-18 NOTE — PROGRESS NOTES
Pt. Tolerating extubation at 1545 well.   Jd Faustin NP states to hold tube feeding until AM and check with MD in am regarding restart of tube feedings.  Pt. Alert and following commands.  No resp distress at this time--tolerating BIpap

## 2019-01-18 NOTE — PLAN OF CARE
PT/cardiac rehab eval held again today as per MD unable to tolerate. Will reschedule patient for Sunday and proceed as able.

## 2019-01-18 NOTE — PROGRESS NOTES
Patient suctioned and electively extubated at 15:45 per physician order. Placed on BiPAP 12/5 RR 18 50% small full face mask, No stridor noted. Alarm volume set at 10. Patient tolerated procedure well without any immediate complications. Rt will continue to monitor and follow.    Azucena Bazan, RT  1/18/2019 4:01 PM

## 2019-01-18 NOTE — PROGRESS NOTES
Renal Medicine Progress Note    SHORTHAND KEY FOR MY NOTES:  c = with, s = without, p = after, a = before, x = except, asx = asymptomatic, tx = transplant or treatment, sx = symptoms or symptomatic, cx = canceled or culture, rxn = reaction, yday = yesterday, nl = normal, abx = antibiotics, fxn = function, dx = diagnosis, dz = disease, m/h = melena/hematochezia, c/d/l/ha = cramping/dizziness/lightheadedness/headache, d/c = discharge or diarrhea/constipation, f/c/n/v = fevers/chills/nausea/vomiting, cp/sob = chest pain/shortness of breath.         Assessment/Plan:     1.  Non-oliguric IMAN/CKD.  Pt's cr is stable ~1.7 p giving diuretics yday.  It did seem to help, as uo increased a bit.  Remains quite TBV up.  A.  Continue IV alb/furos, but give it twice today.  B.  Follow labs, uo, sx.    2.  S/p aortic dissection/repair.  Stable.  A.  Per CV Surg.    3.  Resp failure/possible pneumonia.  Pt is on a PS trial today to see if she can be extubated.  She is on broad abx.  WBC is stable.      A.  Weaning per ICU.    4.  Anemia. Hb is still trending lower.  No signs of bleeding.    A.  Follow labs.  B.  Transfuse prn.     5.  FEN.   Na has improved.  Now that she is going to get loop diuretics, she will have more free water loss.  A.  Continue same free water replacement for now.  B.  Follow Na.        Interval History:     Unable.  Pt is intubated/sedated.          Medications and Allergies:       albumin human  25 g Intravenous Q12H     aspirin  81 mg Per Feeding Tube Daily     chlorhexidine  15 mL Swish & Spit BID     furosemide  40 mg Intravenous Q12H     influenza Vac Split High-Dose  0.5 mL Intramuscular Prior to discharge     insulin aspart  1-6 Units Subcutaneous Q4H     ipratropium - albuterol 0.5 mg/2.5 mg/3 mL  3 mL Nebulization 4x daily     lidocaine (PF)  10 mL Subcutaneous Once     meropenem  500 mg Intravenous Q8H     metoprolol  2.5 mg Intravenous Q6H     multivitamins w/minerals  15 mL Per Feeding Tube  "Daily     nystatin  500,000 Units Oral 4x Daily     pantoprazole  40 mg Oral QAM AC     predniSONE  40 mg Oral Daily     QUEtiapine  50 mg Oral BID     sodium chloride (PF)  10 mL Intracatheter Q8H     sodium phosphate  1 enema Rectal Once     warfarin  2.5 mg Oral ONCE at 18:00     Allergies   Allergen Reactions     Amoxicillin Swelling     Ciprofloxacin Swelling          Physical Exam:     Vitals were reviewed  Heart Rate: 80, Blood pressure 92/57, pulse 80, temperature 98.4  F (36.9  C), resp. rate 25, height 1.626 m (5' 4\"), weight 98.7 kg (217 lb 9.5 oz), SpO2 93 %.  Wt Readings from Last 3 Encounters:   01/18/19 98.7 kg (217 lb 9.5 oz)   09/14/08 80.7 kg (178 lb)     Intake/Output Summary (Last 24 hours) at 1/18/2019 1211  Last data filed at 1/18/2019 0800  Gross per 24 hour   Intake 3284.1 ml   Output 637 ml   Net 2647.1 ml     GENERAL APPEARANCE: intubated, sedated, sitting up in chair  HEENT:  Eyes/ears/nose/neck grossly nl  RESP: + vent sounds, + few wheezes  CV: RRR c 2/6 m, nl S1/S2, + pacer   ABDOMEN: o/s/nt/nd  EXTREMITIES/SKIN: 1+ edema  NEURO:  Sedated, eyes open  LINES:  + coello c yellow urine; + RIJ 3-lumen         Data:     CBC RESULTS:     Recent Labs   Lab 01/18/19  0502 01/17/19  0530 01/16/19  0520 01/15/19  0446 01/14/19  0406 01/13/19  0600   WBC 12.5* 14.9* 12.1* 16.7* 27.3* 21.6*   RBC 2.48* 2.57* 2.59* 2.80* 3.26* 2.75*   HGB 7.2* 7.6* 7.6* 8.3* 9.7* 8.1*   HCT 21.9* 22.5* 22.4* 24.1* 27.6* 23.1*     Canceled, Test credited 187 193 237 250 189     Basic Metabolic Panel:  Recent Labs   Lab 01/18/19  0502 01/17/19  0530 01/16/19  0520 01/15/19  0955 01/15/19  0446 01/14/19  0406  01/13/19  1950    143 147*  --  149* 148*  --  150*   POTASSIUM 4.4 3.9 3.7 3.6 3.1* 3.8   < > 3.1*   CHLORIDE 105 107 110*  --  110* 110*  --  111*   CO2 24 28 28  --  30 26  --  28   BUN 69* 62* 60*  --  59* 63*  --  63*   CR 1.73* 1.68* 1.68*  --  1.95* 1.95*  --  1.84*   * 116* 131*  --  " 115* 116*  --  111*   BESS 8.1* 8.0* 8.3*  --  8.4* 8.2*  --  8.0*    < > = values in this interval not displayed.     INR  Recent Labs   Lab 01/18/19  0502 01/17/19  0530 01/16/19  0520 01/15/19  0446   INR 1.79* 2.04* 2.25* 1.89*      Attestation:   I have reviewed today's relevant vital signs, notes, medications, labs and imaging.    Osman Madrid MD  [facility] - Nephrology  [phone]

## 2019-01-18 NOTE — PLAN OF CARE
"Patient stable this shift. Did better on PS trial. Will extubate this afternoon to bipap. Family aware that there is a chance for needing reintubation, but want to \"give her a shot\" before she gets trached. On dex. Still off levophed. Keofeed khurram-see progress note. Albumin+lasix combo-better UOP after these. Small BM/smear this afternoon. Will continue to monitor closely.   "

## 2019-01-18 NOTE — PROGRESS NOTES
"Keofeed placed at bedside. Multiple attempts by different nurses. NP verified tip of keofeed at piloric valve, but still in stomach. NP stated to leave it there since it's \"so close\" and let it \"migrate down on it's own\". Also, order to hold TF and flushes for now until after extubation to empty stomach/prevent aspiration during/after extubation. Will keep keofeed clamped as of now. Monitor closely.  "

## 2019-01-18 NOTE — PROGRESS NOTES
CLINICAL NUTRITION SERVICES - REASSESSMENT NOTE      Future/Additional Recommendations:   Plan for extubation  and removal of OG. NJ will replace OG for TF administration. Started TF through NJ at 35 ml/hr for 8 hr then increase to 50 mL/hr. This 50 mL/hr regimen will provide 1800 kcals (29 kcal/kg), 82 gm pro (1.3 gm/kg), 924 mL H20, 226 gm CHO, no fiber.  - Continue with 200 mL H2O flushes q 2 hrs   Malnutrition:  ()  % Weight Loss:  None noted  % Intake:  </= 50% for >/= 5 days (severe malnutrition)  Subcutaneous Fat Loss:  None observed  Muscle Loss:  Temporal region mild depletion and Clavicle bone region mild depletion  Fluid Retention:  Mild - Could be partly nutritional due to prolonged poor nutrition     Malnutrition Diagnosis: Non-Severe malnutrition  In Context of:  Acute illness or injury       EVALUATION OF PROGRESS TOWARD GOALS   Diet: NPO    Nutrition Support Enteral:  Type of Feeding Tube: OG  Enteral Frequency:  Continuous  Enteral Regimen: Peptamen 1.5 to 35 mL/hr  Total Enteral Provisions: 1260 kcals (20 kcal/kg), 57 gm pro (0.9 gm/kg), 647 mL H20, 158 gm CHO, no fiber  Free Water Flush: H20 flushes 200 mL every 2 hrs    Intake: TF increased from 20 mL/hr to 35 mL/hr at 0600 on . TF has run with minimal to no interruptions.  - M.5 (H), Phos and K+: WNL  - B-179   - BUN: 69 (H), Cr: 1.73 (H) - pt with atrophic right kidney   - Last BM:   - Wt: 98.7 kg (up 14.7 kg since admit)  - Mild to moderate edema (generalized and extremities)  - Lasix started  with plans to stop     ASSESSED NUTRITION NEEDS:  Dosing Weight:  62 kg (adjusted for overweight)  Estimated Energy Needs:  7060-0323 kcals (25-30 Kcal/Kg)  Justification: overweight  Estimated Protein Needs:  74-87 grams protein (1.2-1.4 g pro/Kg)  Justification: Repletion, post-op and atrophic right kidney (monitor tolerance to protein and liberalize as able)    NEW FINDINGS:   Chart Review:   - Plan for extubation  1/18. NJ will be placed before extubation and will become the route for TF administration.   - NP discussed possible trach with family    Medications Review:  - Certavite daily via FT      Previous Goals:   Goal TF Peptamen 1.5 at 50 mL/hr will be achieved in the next 48 hrs  Evaluation: Not met    Pt will tolerate nutrition support without refeeding syndrome  Evaluation: Met    Previous Nutrition Diagnosis:   Inadequate protein-energy intake related to altered GI function s/p heart surgery and unable to place PICC for TPN as evidenced by limited nutritional intake since admission x 11 days with plan to restart trickle TF today  Evaluation: Improving      CURRENT NUTRITION DIAGNOSIS  Inadequate enteral nutrition related to low TF rate as evidenced by TF regimen only meeting 70-80% estimated needs.    INTERVENTIONS  Recommendations / Nutrition Prescription  TF through NJ should be started at 35 ml/hr for 8 hr then increase to 50 mL/hr.  This 50 mL/hr regimen will provide 1800 kcals (29 kcal/kg), 82 gm pro (1.3 gm/kg), 924 mL H20, 226 gm CHO, no fiber.  - Continue with 200 mL H2O flushes q 2 hrs    Implementation  None    Goals  - Pt increase to Peptamen 1.5 @ 50 mL/hr to meet estimated needs.     MONITORING AND EVALUATION:  Progress towards goals will be monitored and evaluated per protocol and Practice Guidelines    Radha Villagomez   Dietetic Intern

## 2019-01-18 NOTE — PROGRESS NOTES
Vascular Surgery Progress Note    Seen in ICU this am while intubated, has been stable overnight and pressors were weaned, no acute concerns.  Extubated this afternoon    B/P: 92/55, T: 98.5, P: 80, R: 17  Alert no acute distress  Vented  Abd soft, nontender, mild distension.    WBC   Date Value Ref Range Status   01/18/2019 12.5 (H) 4.0 - 11.0 10e9/L Final   ]  Hemoglobin   Date Value Ref Range Status   01/18/2019 7.2 (L) 11.7 - 15.7 g/dL Final   ]  INR   Date Value Ref Range Status   01/18/2019 1.79 (H) 0.86 - 1.14 Final      Creatinine   Date Value Ref Range Status   01/18/2019 1.73 (H) 0.52 - 1.04 mg/dL Final   ]      Intake/Output Summary (Last 24 hours) at 1/18/2019 1608  Last data filed at 1/18/2019 1400  Gross per 24 hour   Intake 2975.5 ml   Output 875 ml   Net 2100.5 ml       Assessment/Plan:  78 year old female s/p repair of Type A dissection    Remains in ICU with multiple medical concerns, slowly improving.  Abdominal exam has been stable, no sign of bowel ischemia or other malperfusion at this time.  Please call if any concerns, will follow peripherally.    Saige Brown MD  Vascular Surgery Fellow  Pager [phone]

## 2019-01-18 NOTE — PROGRESS NOTES
[facility]  Cardiovascular and Thoracic Surgery Daily Note          Assessment and Plan:   POD#14 s/p Left groin cutdown with exposure of the femoral vessels with arterial cannulation for cardiopulmonary bypass. Right groin cutdown with exposure of the femoral vessels with the right femoral artery ultimately used for diagnostic angiography. 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 Visceral angiography. Repair of the bilateral femoral vessels. By Dr. Jose Winslow.    Main Plans for today:  1. PS and trial extubation per ccare  2. Work with therapies  3. Diuresis at direction of nephrology  4. Place postpyloric feeding tube    CVS:   BP /50-60s, off norepi overnight; HR 80/ 100% VVI paced.  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 On oral amiodarone and coumadin with no bridging. Appreciate EP recs.  INR  1.79 today  Pulm:  Extubated per protocol and reintubated on POD #8 for resp distress.  CT chest on 1/14 demonstrates RLL compressive atelectasis 2/2 elevated hemidiaphragm (present preoperatively). CXR/CT chest with fluid in fissures. Diuresing with assistance of nephrology.  POD #7 left US guided thoracentesis with removal of 150 mL fluid.   Tolerating current vent settings: .4/500/18/5  Anxious with PS trial. Will extubate today to bipap per request of family before decided on tracheostomy.  Neuro:  Grossly intact, weakly follows commands.   Pain managed on current regimen, sedated with precedex drip.   Renal:  IMAN -BL Creat ~0.8, 1.73 today; Preop weight 77.1 kg, today 98.7 kg, up 5.4 kg overnight.   Renal US on 1/6 with patent flow in bilateral renal arteries. Right kidney atrophic.  Renal re-consulted. Appreciate. Diuresis at [facility] nephrology - appreciate.  BMP in AM  GI:  Malnourished. Receiving tube feedings via OG.  Place postpyloric feeding tube before extubating to bipap today.  On oral prednisone x 5 days for microscopic colitis, flex sig biopsy negative.  CT abdomen on  did not demonstrate acute pathology.   and  negative for c. Diff  On PPI  :  Barrientos present - strict I/O, limited mobility  Endo:   Preop A1C 5.3; managed on sliding scale insulin. Anticipate hyperglycemia 2/2 steroid. Goal BG <180  FEN:   Replace lytes as needed; Continue tube feedings after postpyloric placed  ID:   Temp (24hrs), Av.7  F (37.1  C), Min:94.64  F (34.8  C), Max:100.04  F (37.8  C)  Stress and steroid induced leukocytosis. WBC 12.5.   No further antibiotics.  Sputum + candida,on nystatin; BC NTD.  Heme:   Acute blood loss anemia and thrombocytopenia: Hgb 7.2; Plt 193  CBC in AM  Tubes/Drains:  Right internal jugular  Feeding tube  Barrientos  Proph:   BB, statin, ASA, subcutaneous heparin, coumadin, PCD, PPI  Dispo:   Continue in ICU with close monitoring  Appreciate consulting services          Interval History:   Sitting up in converter chair, PS trial. Follows commands. No norepi requirement overnight.         Medications:       aspirin  81 mg Per Feeding Tube Daily     chlorhexidine  15 mL Swish & Spit BID     influenza Vac Split High-Dose  0.5 mL Intramuscular Prior to discharge     insulin aspart  1-6 Units Subcutaneous Q4H     ipratropium - albuterol 0.5 mg/2.5 mg/3 mL  3 mL Nebulization 4x daily     lidocaine (PF)  10 mL Subcutaneous Once     lidocaine   Topical Once     meropenem  500 mg Intravenous Q8H     metoclopramide  10 mg Intravenous Once     metoprolol  2.5 mg Intravenous Q6H     multivitamins w/minerals  15 mL Per Feeding Tube Daily     nystatin  500,000 Units Oral 4x Daily     pantoprazole  40 mg Oral QAM AC     predniSONE  40 mg Oral Daily     QUEtiapine  50 mg Oral BID     sodium chloride (PF)  10 mL Intracatheter Q8H     sodium phosphate  1 enema Rectal Once     warfarin  2.5 mg Oral ONCE at 18:00  "    acetaminophen, albuterol, IV fluid REPLACEMENT ONLY, IV fluid REPLACEMENT ONLY, glucose **OR** dextrose **OR** glucagon, heparin lock flush, hydrALAZINE, HYDROmorphone, lidocaine 4%, lidocaine 4%, lidocaine (buffered or not buffered), lidocaine (buffered or not buffered), magnesium sulfate, magnesium sulfate, meperidine, methocarbamol, naloxone, ondansetron **OR** ondansetron, oxyCODONE IR, potassium chloride, potassium chloride with lidocaine, potassium chloride, potassium chloride, potassium chloride, sodium chloride (PF), sodium chloride (PF), sodium chloride (PF), [COMPLETED] sodium phosphate **FOLLOWED BY** sodium phosphate, sodium phosphate, sodium phosphate, sodium phosphate, sodium phosphate, Warfarin Therapy Reminder          Physical Exam:   Vitals were reviewed  Blood pressure 92/57, pulse 80, temperature 98.4  F (36.9  C), resp. rate 25, height 1.626 m (5' 4\"), weight 98.7 kg (217 lb 9.5 oz), SpO2 93 %.  Rhythm: VVI paced at 80 on bedside monitor    Lungs: Coarse throughout on ventilator    Cardiovascular: S1, S2, RRR, no m/r/g.     Abdomen: Distended/NT. + BS. OG in place    Extremeties: 2+ edema with 1+ palp pedal pulses    Incision(s): Sternal and right femoral incisions CDI    CT: N/A    Weight:   Vitals:    01/14/19 0500 01/15/19 0500 01/16/19 0400 01/17/19 0400   Weight: 91.5 kg (201 lb 11.5 oz) 90.4 kg (199 lb 6.4 oz) 92.1 kg (203 lb) 93.3 kg (205 lb 11 oz)    01/18/19 0500   Weight: 98.7 kg (217 lb 9.5 oz)            Data:    All labs and imaging reviewed by me.  Labs:   Lab Results   Component Value Date    WBC 12.5 01/18/2019     Lab Results   Component Value Date    RBC 2.48 01/18/2019     Lab Results   Component Value Date    HGB 7.2 01/18/2019     Lab Results   Component Value Date    HCT 21.9 01/18/2019     No components found for: MCT  Lab Results   Component Value Date    MCV 88 01/18/2019     Lab Results   Component Value Date    MCH 29.0 01/18/2019     Lab Results   Component Value " Date    MCHC 32.9 01/18/2019     Lab Results   Component Value Date    RDW 18.1 01/18/2019     Lab Results   Component Value Date    PLT Canceled, Test credited 01/18/2019     01/18/2019       Last Basic Metabolic Panel:  Lab Results   Component Value Date     01/18/2019      Lab Results   Component Value Date    POTASSIUM 4.4 01/18/2019     Lab Results   Component Value Date    CHLORIDE 105 01/18/2019     Lab Results   Component Value Date    BESS 8.1 01/18/2019     Lab Results   Component Value Date    CO2 24 01/18/2019     Lab Results   Component Value Date    BUN 69 01/18/2019     Lab Results   Component Value Date    CR 1.73 01/18/2019     Lab Results   Component Value Date     01/18/2019         GRZEGORZ García, CCRN-[facility]  CV Surgery  [place] Pager: [phone]  Personal Pager: [phone]    Patient seen and examined. Agree with plan.

## 2019-01-18 NOTE — PROGRESS NOTES
[facility]  Critical Care Service  Progress Note  Date of Service (when I saw the patient): 01/18/2019  Main Plans for Today    Lasix 40 mg x2   Albumin x 2   Increase Seroquel dosing   PST as able, trial extubation    Discussed possible trach with family        Assessment & Plan    Priya Funez is a 78 year old female with PMH of HTN admitted on 1/4/2019 with sudden onset of back pain found to have type A aortic dissection and sent for emergency aortic dissection repair with graft.  She was admitted to ICU for ongoing care.     1/5: did well overnight.  Epi weaned off; requiring low dose phenylephrine.  Hgb stable. Extubated in the afternoon.   1/6:poor inspiratory effort and weak cough through the day.  Placed on Bipap overnight.  NTG gtt for improved BP control.  Rigors and fever to 103  1/7: Fever resolved; stable respiratory status on HFNC.  SvO2 48; getting echocardiogram.  Normal cardiac output; EF 55-60%   1/8: Rapid A Fib  1/9: Titrating down FiO2; improved ABG and VBG   1/11-12, hypoxic requiring bipap with little reserve, low UO, fluid resuscitated  with albumin blood and lasix  1/13: re-intubated late on 1/12; significant metabolic acidosis, increased work of breathing      Neuro  1. Sedation   2. ICU delirium  3. Acute pain  Plan:  -- Stop Fentanyl drip   --PRN acetaminophen, oxycodone, and IV hydromorphone .   --Precedex as needed for sedation   --Maintain circadian rhythm.  Lights on during the day.  Off at night, minimize cares at night.  OOB during the day.   --Up to chair daily   --Quetiapine 50 mg BID      CV  1. S/p Type A aortic dissection repair 1/4 with Davis Little, and Michaela  2. HTN  3. Afib with RVR, uncontrolled with medications now s/p AV node ablation and pacemaker placement 1/11/19  4. Shock; unclear source hypovolemic vs septic; normal lactic acid but increasing leukocytosis and ongoing hypotension requiring vasopressor. Now resolved    Plan:  --CV Surgery  and Vascular surgery primary   --Monitor hemodynamics.  Maintain SBP<130; MAP >65  --Noepi as needed to maintain SBP>65; Currently off   --Cardiac meds per CV surgery, IV metoprolol if parameters met   --Echocardiogram 1/7; EF 50-55%; IVC not dilated, ventricles small   --started on Warfarin for Afib; no bridging.  INR therapeutic 1/16  --venous oxyhemoglobin 60      Resp:  1. Post operative ventilator management.   2. Acute hypoxemic /hypercapnic respiratory failure; re-intubated 1/12.    Plan:  -- Initially tolerated HFO2 then bipap then re-intubated 1/12 secondary to increased WOB and SBO.  Only mild hypoxia on ABG at time of re-intubation; had profound metabolic acidosis (now improved)   --scheduled duoneb; albuterol nebs PRN  -- CT CAP 1/14 with moderate amount of fluid in fissures, interstitial edema; paralyzed right hemidiaphragm   --PST daily; now with recurrent tachypnea. Appears to be related to anxiety. Will optimize her mentation as able.  -- Discussed with family regarding tracheostomy, family agreeable to tracheostomy. Will consult thoracic for trach placement. Likely early next week.         GI/Nutrition  1. Malnutrition, nia/pro deficits  2. Ongoing diarrhea; presumed colitis.   Plan:  --Repeat c.diff Negative     --GI consulted. Abdomin distended, abd flat plate shows edematous bowel pattern.  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 Lactic acid has been normal.  Flex sig performed 1/15; diverticulum look fine, mucosa inflamed/congested; favoring colitis. No ischemia noted.  Biopsy showing-- Nonneoplastic colonic mucosa with microscopic lymphoid aggregate, no   evidence of microscopic colitis,   inflammatory bowel disease or malignancy  .  GI recommends Budesenide, 9mg daily. Unable to give via feeding tube. Started on 5 day course oral prednisone    -- General surgery consult to follow with us.  Gastroenterology consulted to evaluate for colitis   -- Continue tube feeds        Renal  1. IMAN; abdominal aortic dissection involving right renal artery.  Atrophic right kidney with decreased flow. No prior hx.  Baseline creatinine appears to be 0.8;   2. Metabolic alkalosis  3. Hypophosphatemia   4. Metabolic acidosis; resolved   5. Hypernatremia  Plan:  --Nephrology consulted. Renogram 1/7.  Renal US 1/6 with flow present in both R/L renal arteries. Right kidney atrophic.    -- Lasix drip stopped this 1/14; patient requiring vasopressors due to hypotension following >24 hours of diuretic drip; worsening renal function; metabolic alkalosis.    --Continue enteric free water at 100mL/hr      ID  1. Fevers (improved)  2. Leukocytosis (imporoved)  Plan:  -- Brittany-op Cefepime and Vancomycin; completed    -- Cefepime completed 6 days.   --antibiotics broadened back to empiric vanco and millie on 1/12 for hypotension and worsening leukocytosis. No indication for vancomycin; no supporting data.  Vanco stopped. Stopped Millie today 1/17    -- Blood cultures with no growth.  Urinalysis without concern for UTI; did not reflex to culture. Sputum culture with only candida   -- Will likely see increase in WBC given steroid use. Will continue to monitor        Endocrine  1. Stress Hyperglycemia; resolved   Plan:  --  BG check Q 4 hours. Medium resistance sliding scale insulin. Has not been requiring       Heme:  1. Acute blood loss anemia  2. Thrombocytopenia; resolved   3. Superficial thrombophlebitis in the right basilic vein   Plan:  -- Monitor hemoglobin. Transfuse to keep > 7.0  --Warfarin for A Fib; no bridging.  INR therapeutic. Subcutaneous heparin stopped.   --US lower extremities to assess for DVT; no evidence of DVT       MSK/Skin  1. Sternotomy   Plan:  -- Sternal precautions  -- Wound care per protocol      General cares:  DVT Prophylaxis: continue PCD's; Warfarin    GI Prophylaxis: PPI  Restraints: Restraints for medical healing needed: YES   Family update by me today: Yes   Current lines are  required for patient management  Access: JEANNE Faustin  Time Spent on this Encounter   Billing:  I spent 50 minutes bedside and on the inpatient unit today managing the critical care of Priya Funez in relation to the issues listed in this note.    Interval History    Course reviewed, no acute events noted. Discussed with family regarding trach vs trial of extubation. Pt remains tachypneic while on PS. Tachypnea maybe related to anxiety and/or her right hemidiaphragm. Family has agreed to trial extubation. Thoracic consult per CVTS.     Physical Exam   Temp: 98.4  F (36.9  C) Temp src: Esophageal Temp  Min: 94.64  F (34.8  C)  Max: 100.04  F (37.8  C) BP: 92/57 Pulse: 80 Heart Rate: 80 Resp: 25 SpO2: 93 % O2 Device: Mechanical Ventilator    Vitals:    19 0400 19 0400 19 0500   Weight: 92.1 kg (203 lb) 93.3 kg (205 lb 11 oz) 98.7 kg (217 lb 9.5 oz)     I/O last 3 completed shifts:  In: 3971.2 [I.V.:671.2; NG/GT:2460]  Out: 787 [Urine:787]    GEN: intubated,awake. Alert  NAD   EYES: PERRL, 3+,  Anicteric sclera.   HEENT:  Normocephalic, atraumatic, trachea midline, ETT secure   CV: RRR,  no gallops, rubs, or murmurs  PULM/CHEST: Clear anterior breath sounds bilaterally, no wheeze, symmetric chest rise  GI: distended, soft , tender to palpation in left lower quadrant   : coello catheter in place, urine yellow and clear  EXTREMITIES: ++ peripheral edema on right, 1+ on left, moving all extremities, peripheral pulses intact  NEURO: sedated, awakens to voice, follows commands    SKIN: warm and dry  Imaging personally reviewed:  CXR   EC% V-paced     Data   Recent Labs   Lab 19  0502 19  0530 19  0520  19  0406  19  0600   WBC 12.5* 14.9* 12.1*   < > 27.3*  --  21.6*   HGB 7.2* 7.6* 7.6*   < > 9.7*  --  8.1*   MCV 88 88 87   < > 85  --  84     Canceled, Test credited 187 193   < > 250  --  189   INR 1.79* 2.04* 2.25*   < > 1.58*  --  1.59*   NA  139 143 147*   < > 148*   < > 149*   POTASSIUM 4.4 3.9 3.7   < > 3.8   < > 3.5   CHLORIDE 105 107 110*   < > 110*   < > 112*   CO2 24 28 28   < > 26   < > 23   BUN 69* 62* 60*   < > 63*   < > 61*   CR 1.73* 1.68* 1.68*   < > 1.95*   < > 1.79*   ANIONGAP 10 8 9   < > 12   < > 14   BESS 8.1* 8.0* 8.3*   < > 8.2*   < > 7.8*   * 116* 131*   < > 116*   < > 98   ALBUMIN  --   --   --   --  2.4*  --  2.4*   PROTTOTAL  --   --   --   --  5.6*  --  5.1*   BILITOTAL  --   --   --   --  2.6*  --  2.2*   ALKPHOS  --   --   --   --  111  --  85   ALT  --   --   --   --  28  --  26   AST  --   --   --   --  40  --  33    < > = values in this interval not displayed.

## 2019-01-18 NOTE — PLAN OF CARE
Pt remains able to fc and carroll, perrla. Pt had minimal secretions was on vent support with precedex gtt. Pt VSS with vpaced 100%. Pt map maintained >65 levo left off this shift period. Pt tolerating tf at goal remains on 100 q 1 hr flush. Pt Barrientos diminshed but adequate.

## 2019-01-19 ENCOUNTER — APPOINTMENT (OUTPATIENT)
Dept: GENERAL RADIOLOGY | Facility: CLINIC | Age: 79
DRG: 003 | End: 2019-01-19
Payer: COMMERCIAL

## 2019-01-19 LAB
ANION GAP SERPL CALCULATED.3IONS-SCNC: 9 MMOL/L (ref 3–14)
BASE EXCESS BLDA CALC-SCNC: 2.3 MMOL/L
BUN SERPL-MCNC: 73 MG/DL (ref 7–30)
CALCIUM SERPL-MCNC: 8.3 MG/DL (ref 8.5–10.1)
CHLORIDE SERPL-SCNC: 102 MMOL/L (ref 94–109)
CO2 SERPL-SCNC: 27 MMOL/L (ref 20–32)
CREAT SERPL-MCNC: 1.71 MG/DL (ref 0.52–1.04)
ERYTHROCYTE [DISTWIDTH] IN BLOOD BY AUTOMATED COUNT: 17.9 % (ref 10–15)
GFR SERPL CREATININE-BSD FRML MDRD: 29 ML/MIN/{1.73_M2}
GLUCOSE BLDC GLUCOMTR-MCNC: 114 MG/DL (ref 70–99)
GLUCOSE BLDC GLUCOMTR-MCNC: 121 MG/DL (ref 70–99)
GLUCOSE BLDC GLUCOMTR-MCNC: 142 MG/DL (ref 70–99)
GLUCOSE BLDC GLUCOMTR-MCNC: 154 MG/DL (ref 70–99)
GLUCOSE BLDC GLUCOMTR-MCNC: 77 MG/DL (ref 70–99)
GLUCOSE BLDC GLUCOMTR-MCNC: 99 MG/DL (ref 70–99)
GLUCOSE SERPL-MCNC: 96 MG/DL (ref 70–99)
HCO3 BLD-SCNC: 26 MMOL/L (ref 21–28)
HCT VFR BLD AUTO: 22.6 % (ref 35–47)
HGB BLD-MCNC: 7.5 G/DL (ref 11.7–15.7)
INR PPP: 1.76 (ref 0.86–1.14)
MCH RBC QN AUTO: 29.2 PG (ref 26.5–33)
MCHC RBC AUTO-ENTMCNC: 33.2 G/DL (ref 31.5–36.5)
MCV RBC AUTO: 88 FL (ref 78–100)
O2/TOTAL GAS SETTING VFR VENT: ABNORMAL %
OXYHGB MFR BLD: 98 % (ref 92–100)
PCO2 BLD: 34 MM HG (ref 35–45)
PH BLD: 7.49 PH (ref 7.35–7.45)
PLATELET # BLD AUTO: 228 10E9/L (ref 150–450)
PO2 BLD: 117 MM HG (ref 80–105)
POTASSIUM SERPL-SCNC: 3.9 MMOL/L (ref 3.4–5.3)
RBC # BLD AUTO: 2.57 10E12/L (ref 3.8–5.2)
SODIUM SERPL-SCNC: 138 MMOL/L (ref 133–144)
WBC # BLD AUTO: 13 10E9/L (ref 4–11)

## 2019-01-19 PROCEDURE — 25000128 H RX IP 250 OP 636: Performed by: THORACIC SURGERY (CARDIOTHORACIC VASCULAR SURGERY)

## 2019-01-19 PROCEDURE — 94640 AIRWAY INHALATION TREATMENT: CPT

## 2019-01-19 PROCEDURE — 25000128 H RX IP 250 OP 636: Performed by: STUDENT IN AN ORGANIZED HEALTH CARE EDUCATION/TRAINING PROGRAM

## 2019-01-19 PROCEDURE — 25000125 ZZHC RX 250: Performed by: NURSE PRACTITIONER

## 2019-01-19 PROCEDURE — 25000132 ZZH RX MED GY IP 250 OP 250 PS 637: Performed by: NURSE PRACTITIONER

## 2019-01-19 PROCEDURE — 20000003 ZZH R&B ICU

## 2019-01-19 PROCEDURE — 99291 CRITICAL CARE FIRST HOUR: CPT | Performed by: NURSE PRACTITIONER

## 2019-01-19 PROCEDURE — 00000146 ZZHCL STATISTIC GLUCOSE BY METER IP

## 2019-01-19 PROCEDURE — 25000128 H RX IP 250 OP 636: Performed by: INTERNAL MEDICINE

## 2019-01-19 PROCEDURE — 94660 CPAP INITIATION&MGMT: CPT

## 2019-01-19 PROCEDURE — 25000132 ZZH RX MED GY IP 250 OP 250 PS 637

## 2019-01-19 PROCEDURE — 36600 WITHDRAWAL OF ARTERIAL BLOOD: CPT

## 2019-01-19 PROCEDURE — 40000275 ZZH STATISTIC RCP TIME EA 10 MIN

## 2019-01-19 PROCEDURE — 25000132 ZZH RX MED GY IP 250 OP 250 PS 637: Performed by: PHYSICIAN ASSISTANT

## 2019-01-19 PROCEDURE — 25000132 ZZH RX MED GY IP 250 OP 250 PS 637: Performed by: INTERNAL MEDICINE

## 2019-01-19 PROCEDURE — P9047 ALBUMIN (HUMAN), 25%, 50ML: HCPCS | Performed by: INTERNAL MEDICINE

## 2019-01-19 PROCEDURE — 74018 RADEX ABDOMEN 1 VIEW: CPT

## 2019-01-19 PROCEDURE — 25000132 ZZH RX MED GY IP 250 OP 250 PS 637: Performed by: THORACIC SURGERY (CARDIOTHORACIC VASCULAR SURGERY)

## 2019-01-19 PROCEDURE — 94640 AIRWAY INHALATION TREATMENT: CPT | Mod: 76

## 2019-01-19 PROCEDURE — 85610 PROTHROMBIN TIME: CPT | Performed by: PHYSICIAN ASSISTANT

## 2019-01-19 PROCEDURE — 71045 X-RAY EXAM CHEST 1 VIEW: CPT

## 2019-01-19 PROCEDURE — 80048 BASIC METABOLIC PNL TOTAL CA: CPT | Performed by: PHYSICIAN ASSISTANT

## 2019-01-19 PROCEDURE — 85027 COMPLETE CBC AUTOMATED: CPT | Performed by: PHYSICIAN ASSISTANT

## 2019-01-19 PROCEDURE — 25000132 ZZH RX MED GY IP 250 OP 250 PS 637: Performed by: STUDENT IN AN ORGANIZED HEALTH CARE EDUCATION/TRAINING PROGRAM

## 2019-01-19 PROCEDURE — 82805 BLOOD GASES W/O2 SATURATION: CPT | Performed by: NURSE PRACTITIONER

## 2019-01-19 RX ORDER — LORAZEPAM 2 MG/ML
0.5 INJECTION INTRAMUSCULAR
Status: COMPLETED | OUTPATIENT
Start: 2019-01-19 | End: 2019-01-19

## 2019-01-19 RX ORDER — QUETIAPINE FUMARATE 25 MG/1
25 TABLET, FILM COATED ORAL EVERY 6 HOURS PRN
Status: DISCONTINUED | OUTPATIENT
Start: 2019-01-19 | End: 2019-01-29 | Stop reason: HOSPADM

## 2019-01-19 RX ORDER — FUROSEMIDE 10 MG/ML
40 INJECTION INTRAMUSCULAR; INTRAVENOUS EVERY 12 HOURS
Status: DISCONTINUED | OUTPATIENT
Start: 2019-01-19 | End: 2019-01-23

## 2019-01-19 RX ORDER — WARFARIN SODIUM 4 MG/1
4 TABLET ORAL
Status: COMPLETED | OUTPATIENT
Start: 2019-01-19 | End: 2019-01-19

## 2019-01-19 RX ORDER — ALBUMIN (HUMAN) 12.5 G/50ML
12.5 SOLUTION INTRAVENOUS EVERY 12 HOURS
Status: DISCONTINUED | OUTPATIENT
Start: 2019-01-19 | End: 2019-01-23

## 2019-01-19 RX ADMIN — OXYCODONE HYDROCHLORIDE 5 MG: 5 TABLET ORAL at 16:53

## 2019-01-19 RX ADMIN — IPRATROPIUM BROMIDE AND ALBUTEROL SULFATE 3 ML: .5; 2.5 SOLUTION RESPIRATORY (INHALATION) at 19:23

## 2019-01-19 RX ADMIN — MEROPENEM 500 MG: 500 INJECTION, POWDER, FOR SOLUTION INTRAVENOUS at 01:46

## 2019-01-19 RX ADMIN — ACETAMINOPHEN 650 MG: 325 TABLET, FILM COATED ORAL at 11:54

## 2019-01-19 RX ADMIN — NYSTATIN 500000 UNITS: 500000 SUSPENSION ORAL at 18:00

## 2019-01-19 RX ADMIN — METOPROLOL TARTRATE 2.5 MG: 5 INJECTION, SOLUTION INTRAVENOUS at 21:33

## 2019-01-19 RX ADMIN — LORAZEPAM 0.5 MG: 2 INJECTION, SOLUTION INTRAMUSCULAR; INTRAVENOUS at 06:12

## 2019-01-19 RX ADMIN — METOPROLOL TARTRATE 2.5 MG: 5 INJECTION, SOLUTION INTRAVENOUS at 16:27

## 2019-01-19 RX ADMIN — NYSTATIN 500000 UNITS: 500000 SUSPENSION ORAL at 06:07

## 2019-01-19 RX ADMIN — METOPROLOL TARTRATE 2.5 MG: 5 INJECTION, SOLUTION INTRAVENOUS at 04:26

## 2019-01-19 RX ADMIN — ACETAMINOPHEN 650 MG: 325 TABLET, FILM COATED ORAL at 16:53

## 2019-01-19 RX ADMIN — POTASSIUM CHLORIDE 20 MEQ: 400 INJECTION, SOLUTION INTRAVENOUS at 06:07

## 2019-01-19 RX ADMIN — PREDNISONE 40 MG: 20 TABLET ORAL at 08:52

## 2019-01-19 RX ADMIN — MEROPENEM 500 MG: 500 INJECTION, POWDER, FOR SOLUTION INTRAVENOUS at 08:51

## 2019-01-19 RX ADMIN — IPRATROPIUM BROMIDE AND ALBUTEROL SULFATE 3 ML: .5; 2.5 SOLUTION RESPIRATORY (INHALATION) at 15:27

## 2019-01-19 RX ADMIN — OXYCODONE HYDROCHLORIDE 5 MG: 5 TABLET ORAL at 11:55

## 2019-01-19 RX ADMIN — IPRATROPIUM BROMIDE AND ALBUTEROL SULFATE 3 ML: .5; 2.5 SOLUTION RESPIRATORY (INHALATION) at 07:58

## 2019-01-19 RX ADMIN — FUROSEMIDE 40 MG: 10 INJECTION, SOLUTION INTRAVENOUS at 10:48

## 2019-01-19 RX ADMIN — Medication 40 MG: at 08:51

## 2019-01-19 RX ADMIN — METOPROLOL TARTRATE 2.5 MG: 5 INJECTION, SOLUTION INTRAVENOUS at 10:05

## 2019-01-19 RX ADMIN — QUETIAPINE 50 MG: 25 TABLET ORAL at 21:21

## 2019-01-19 RX ADMIN — NYSTATIN 500000 UNITS: 500000 SUSPENSION ORAL at 12:17

## 2019-01-19 RX ADMIN — ASPIRIN 81 MG 81 MG: 81 TABLET ORAL at 08:52

## 2019-01-19 RX ADMIN — ALBUMIN HUMAN 12.5 G: 0.25 SOLUTION INTRAVENOUS at 10:02

## 2019-01-19 RX ADMIN — INSULIN ASPART 1 UNITS: 100 INJECTION, SOLUTION INTRAVENOUS; SUBCUTANEOUS at 19:54

## 2019-01-19 RX ADMIN — FUROSEMIDE 40 MG: 10 INJECTION, SOLUTION INTRAVENOUS at 21:21

## 2019-01-19 RX ADMIN — ALBUTEROL SULFATE 2.5 MG: 2.5 SOLUTION RESPIRATORY (INHALATION) at 00:30

## 2019-01-19 RX ADMIN — LORAZEPAM 0.5 MG: 2 INJECTION, SOLUTION INTRAMUSCULAR; INTRAVENOUS at 01:46

## 2019-01-19 RX ADMIN — ALBUMIN HUMAN 12.5 G: 0.25 SOLUTION INTRAVENOUS at 21:21

## 2019-01-19 RX ADMIN — WARFARIN SODIUM 4 MG: 4 TABLET ORAL at 21:21

## 2019-01-19 RX ADMIN — MULTIVITAMIN 15 ML: LIQUID ORAL at 09:02

## 2019-01-19 RX ADMIN — IPRATROPIUM BROMIDE AND ALBUTEROL SULFATE 3 ML: .5; 2.5 SOLUTION RESPIRATORY (INHALATION) at 12:00

## 2019-01-19 RX ADMIN — QUETIAPINE 50 MG: 25 TABLET ORAL at 08:51

## 2019-01-19 RX ADMIN — INSULIN ASPART 1 UNITS: 100 INJECTION, SOLUTION INTRAVENOUS; SUBCUTANEOUS at 16:25

## 2019-01-19 ASSESSMENT — ACTIVITIES OF DAILY LIVING (ADL)
ADLS_ACUITY_SCORE: 17

## 2019-01-19 ASSESSMENT — MIFFLIN-ST. JEOR: SCORE: 1455

## 2019-01-19 NOTE — PROGRESS NOTES
[facility]  Critical Care Service  Progress Note  Date of Service (when I saw the patient): 01/19/2019     Main Plans for Today    CXR  ABDXR-check NFT placememt  Add prn seroquel to BID doses   BIPAP with breaks as able-try HFNC       Assessment & Plan    Priya Funez is a 78 year old female with PMH of HTN admitted on 1/4/2019 with sudden onset of back pain found to have type A aortic dissection and sent for emergency aortic dissection repair with graft.  She was admitted to ICU for ongoing care.     Interim  events:   1/5: did well overnight.  Epi weaned off; requiring low dose phenylephrine.  Hgb stable.  Extubated in the afternoon.   1/6:poor inspiratory effort and weak cough through the day.     Placed on Bipap overnight.NTG gtt for improved BP control.  Rigors and fever  103  1/7: Fever resolved;    stable respiratory status on HFNC.SvO2 48; getting echocardiogram. EF 55-60%   1/8: Rapid A Fib  1/9: Titrating down FiO2; improved ABG and VBG   1/11-12, hypoxic requiring bipap with little reserve, low UO   fluid resuscitated  with albumin blood and lasix  1/13: re-intubated late on 1/12; significant metabolic acidosis, increased work of breathing   1/18/19: extubated to BIPAP     Neuro  1. ICU delirium  2. Acute pain  Plan:  -- discontinue precedex-off since 1/18  --PRN acetaminophen, oxycodone, and IV hydromorphone .   --on scheduled seroquel. Add prns  --Maintain circadian rhythm.  Lights on during the day.  Off at night, minimize cares at night.  OOB during the day.   --Up to chair daily      CV  1. S/p Type A aortic dissection repair 1/4 with Davis Little, and Michaela  2. HTN  3. Afib with RVR, uncontrolled with medications now s/p AV node ablation and pacemaker placement 1/11/19  4. Shock; unclear source hypovolemic vs septic; normal lactic acid but increasing leukocytosis and ongoing hypotension requiring vasopressor. Now resolved    Plan:  --CV Surgery and Vascular surgery  primary   --Monitor hemodynamics.  Maintain SBP<130; MAP >65  --Noepi as needed to maintain SBP>65; Currently off   --Cardiac meds per CV surgery, IV metoprolol if parameters met   --Echocardiogram 1/7; EF 50-55%; IVC not dilated, ventricles small   --started on Warfarin for Afib; no bridging.  INR therapeutic 1/16  --venous oxyhemoglobin 60      Resp:  1. Acute hypoxemic /hypercapnic respiratory failure; re-intubated 1/12.    Plan:  -- Initially tolerated HFO2 then bipap then re-intubated 1/12 secondary to increased WOB and SBO.  Only mild hypoxia on ABG at time of re-intubation; had profound metabolic acidosis (now improved)   --scheduled duoneb; albuterol nebs PRN  -- CT CAP 1/14 with moderate amount of fluid in fissures, interstitial edema; paralyzed right hemidiaphragm   --PST daily; now with recurrent tachypnea. Appears to be related to anxiety. Will optimize her mentation as able.  -- per ICU team from 1/18 Discussed with family regarding tracheostomy, family agreeable to tracheostomy. Will consult thoracic for trach placement if pt fails this recent  extubation          GI/Nutrition  1. Malnutrition, nia/pro deficits  2. Ongoing diarrhea; presumed colitis.   Plan:  --Repeat c.diff Negative     --GI consulted. Abdomin distended, abd flat plate shows edematous bowel pattern.  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 Lactic acid has been normal.  Flex sig performed 1/15; diverticulum look fine, mucosa inflamed/congested; favoring colitis. No ischemia noted.  Biopsy showing-- Nonneoplastic colonic mucosa with microscopic lymphoid aggregate, no evidence of microscopic colitis,   inflammatory bowel disease or malignancy  .  GI recommends Budesenide, 9mg daily. Unable to give via feeding tube. Started on 5 day course oral prednisone    -- General surgery consult to follow with us.  Gastroenterology consulted to evaluate for colitis   -- abd xr to check placement of NGT      Renal  1. IMAN;  abdominal aortic dissection involving right renal artery.  Atrophic right kidney with decreased flow. No prior hx.  Baseline creatinine appears to be 0.8;   2. Metabolic alkalosis  3. Hypophosphatemia   4. Metabolic acidosis; resolved   5. Hypernatremia  Plan:  --Nephrology consulted. Renogram 1/7.  Renal US 1/6 with flow present in both R/L renal arteries. Right kidney atrophic.    -- Lasix drip stopped this 1/14; patient requiring vasopressors due to hypotension following >24 hours of diuretic drip; worsening renal function; metabolic alkalosis.    --Continue enteric free water at 100mL/hr      ID  1. Fevers (improved)  2. Leukocytosis (imporoved)  Plan:  -- Brittany-op Cefepime and Vancomycin; completed    -- Cefepime completed 6 days.   --antibiotics broadened back to empiric vanco and millie on 1/12 for hypotension and worsening leukocytosis. No indication for vancomycin; no supporting data.  Vanco stopped. Stopped Millie today 1/17    -- Blood cultures with no growth.  Urinalysis without concern for UTI; did not reflex to culture. Sputum culture with only candida   -- Will likely see increase in WBC given steroid use. Will continue to monitor        Endocrine  1. Stress Hyperglycemia; resolved   Plan:  --  BG check Q 4 hours. Medium resistance sliding scale insulin. Has not been requiring       Heme:  1. Acute blood loss anemia  2. Thrombocytopenia; resolved   3. Superficial thrombophlebitis in the right basilic vein   Plan:  -- Monitor hemoglobin. Transfuse to keep > 7.0  --Warfarin for A Fib; no bridging.  INR therapeutic. Subcutaneous heparin stopped.   --US lower extremities to assess for DVT; no evidence of DVT       MSK/Skin  1. Sternotomy   Plan:  -- Sternal precautions  -- Wound care per protocol      General cares:  DVT Prophylaxis: continue PCD's; Warfarin    GI Prophylaxis: PPI  Restraints: Restraints for medical healing needed: YES   Family update by me today: Yes I tlked with pt's son at bedside  Current  lines are required for patient management  Access: JEANNE Bueno CNP  Time Spent on this Encounter   Billing:  I spent 55  minutes bedside and on the inpatient unit today managing the critical care of Priya Funez in relation to the issues listed in this note.    Interval History    Family has agreed to trial extubation.extubated yesterday. Thoracic consult per CVTS.     Physical Exam   Temp: 98.5  F (36.9  C) Temp src: Oral Temp  Min: 98  F (36.7  C)  Max: 99  F (37.2  C) BP: 120/64 Pulse: 73 Heart Rate: 70 Resp: (!) 36 SpO2: 99 % O2 Device: BiPAP/CPAP    Vitals:    19 0400 19 0500 19 0000   Weight: 93.3 kg (205 lb 11 oz) 98.7 kg (217 lb 9.5 oz) 98 kg (216 lb 0.8 oz)     I/O last 3 completed shifts:  In: . [I.V.:986.75; NG/GT:790]  Out:  [Urine:]    GEN: awake in bed on BIPAP with full face mask   EYES: PERRL,  Anicteric sclera.   HEENT:  Normocephalic, atraumatic, trachea midline  CV: RRR,   PULM/CHEST: Clear anterior breath sounds bilaterally, no wheeze, symmetric chest rise  GI: soft + bs   : coello catheter in place, urine yellow and clear  EXTREMITIES: ++ peripheral edema on right, 1+ on left, moving all extremities, peripheral pulses intact  NEURO: carroll follows commands   SKIN: warm and dry  Imaging personally reviewed:  CXR   EC% V-paced     Data   Recent Labs   Lab 19  0430 19  0502 19  0530  19  0406  19  0600   WBC 13.0* 12.5* 14.9*   < > 27.3*  --  21.6*   HGB 7.5* 7.2* 7.6*   < > 9.7*  --  8.1*   MCV 88 88 88   < > 85  --  84    193  Canceled, Test credited 187   < > 250  --  189   INR 1.76* 1.79* 2.04*   < > 1.58*  --  1.59*    139 143   < > 148*   < > 149*   POTASSIUM 3.9 4.4 3.9   < > 3.8   < > 3.5   CHLORIDE 102 105 107   < > 110*   < > 112*   CO2 27 24 28   < > 26   < > 23   BUN 73* 69* 62*   < > 63*   < > 61*   CR 1.71* 1.73* 1.68*   < > 1.95*   < > 1.79*   ANIONGAP 9 10 8   < > 12   < > 14   BESS 8.3*  8.1* 8.0*   < > 8.2*   < > 7.8*   GLC 96 120* 116*   < > 116*   < > 98   ALBUMIN  --   --   --   --  2.4*  --  2.4*   PROTTOTAL  --   --   --   --  5.6*  --  5.1*   BILITOTAL  --   --   --   --  2.6*  --  2.2*   ALKPHOS  --   --   --   --  111  --  85   ALT  --   --   --   --  28  --  26   AST  --   --   --   --  40  --  33    < > = values in this interval not displayed.

## 2019-01-19 NOTE — PROGRESS NOTES
Renal Medicine Progress Note    SHORTHAND KEY FOR MY NOTES:  c = with, s = without, p = after, a = before, x = except, asx = asymptomatic, tx = transplant or treatment, sx = symptoms or symptomatic, cx = canceled or culture, rxn = reaction, yday = yesterday, nl = normal, abx = antibiotics, fxn = function, dx = diagnosis, dz = disease, m/h = melena/hematochezia, c/d/l/ha = cramping/dizziness/lightheadedness/headache, d/c = discharge or diarrhea/constipation, f/c/n/v = fevers/chills/nausea/vomiting, cp/sob = chest pain/shortness of breath.         Assessment/Plan:     1.  Non-oliguric IMAN/CKD.  Pt's cr is stable ~1.7.  She is responding well to the IV alb/furos combo.  She is not hypotensive.  Remains TBV up, but better.  A.  Continue IV alb/furos bid.  B.  Follow labs, uo.      2.  S/p aortic dissection/repair.  Stable.  A.  Per CV Surg.    3.  SOB/hypoxia/pneumonia.  Pt is on high flow o2.  She is quite wheezy.  On broad abx.  A.  Continue high flow o2.  B.  Monitor resp status closely.    4.  Anemia. Hb is stable in the 7s.  No signs of bleeding.    A.  Follow labs.  B.  Transfuse prn.     5.  FEN.   Na is nl.  Getting free water 200 q 2h.    A.  Continue same free water replacement for now.  B.  Follow Na.        Interval History:     Unable.  Pt is a bit confused and not speaking clearly.          Medications and Allergies:       albumin human  12.5 g Intravenous Q12H     aspirin  81 mg Per Feeding Tube Daily     furosemide  40 mg Intravenous Q12H     influenza Vac Split High-Dose  0.5 mL Intramuscular Prior to discharge     insulin aspart  1-6 Units Subcutaneous Q4H     ipratropium - albuterol 0.5 mg/2.5 mg/3 mL  3 mL Nebulization 4x daily     lidocaine (PF)  10 mL Subcutaneous Once     meropenem  500 mg Intravenous Q8H     metoprolol  2.5 mg Intravenous Q6H     multivitamins w/minerals  15 mL Per Feeding Tube Daily     nystatin  500,000 Units Oral 4x Daily     pantoprazole  40 mg Oral QAM AC     QUEtiapine  50  "mg Oral BID     sodium chloride (PF)  10 mL Intracatheter Q8H     sodium phosphate  1 enema Rectal Once     warfarin  4 mg Oral or Feeding Tube ONCE at 18:00     Allergies   Allergen Reactions     Amoxicillin Swelling     Ciprofloxacin Swelling          Physical Exam:     Vitals were reviewed  Heart Rate: 70, Blood pressure 120/64, pulse 73, temperature 98.5  F (36.9  C), temperature source Oral, resp. rate (!) 36, height 1.626 m (5' 4\"), weight 98 kg (216 lb 0.8 oz), SpO2 97 %.  Wt Readings from Last 3 Encounters:   01/19/19 98 kg (216 lb 0.8 oz)   09/14/08 80.7 kg (178 lb)     Intake/Output Summary (Last 24 hours) at 1/19/2019 0925  Last data filed at 1/19/2019 0900  Gross per 24 hour   Intake 1757.95 ml   Output 2165 ml   Net -407.05 ml     GENERAL APPEARANCE: lying in bed, awake, confused  HEENT:  Eyes/ears/nose/neck grossly nl x + high flow nc o2  RESP: + wheezes, rhonchi  CV: RRR c 2/6 m, nl S1/S2, + pacer; sternal incision looks ok  ABDOMEN: o/s/nt/nd  EXTREMITIES/SKIN: 1+ edema  NEURO:  Sedated, eyes open  LINES:  + coello c yellow urine; + RIJ 3-lumen         Data:     CBC RESULTS:     Recent Labs   Lab 01/19/19  0430 01/18/19  0502 01/17/19  0530 01/16/19  0520 01/15/19  0446 01/14/19  0406   WBC 13.0* 12.5* 14.9* 12.1* 16.7* 27.3*   RBC 2.57* 2.48* 2.57* 2.59* 2.80* 3.26*   HGB 7.5* 7.2* 7.6* 7.6* 8.3* 9.7*   HCT 22.6* 21.9* 22.5* 22.4* 24.1* 27.6*    193  Canceled, Test credited 187 193 237 250     Basic Metabolic Panel:  Recent Labs   Lab 01/19/19  0430 01/18/19  0502 01/17/19  0530 01/16/19  0520 01/15/19  0955 01/15/19  0446 01/14/19  0406    139 143 147*  --  149* 148*   POTASSIUM 3.9 4.4 3.9 3.7 3.6 3.1* 3.8   CHLORIDE 102 105 107 110*  --  110* 110*   CO2 27 24 28 28  --  30 26   BUN 73* 69* 62* 60*  --  59* 63*   CR 1.71* 1.73* 1.68* 1.68*  --  1.95* 1.95*   GLC 96 120* 116* 131*  --  115* 116*   BESS 8.3* 8.1* 8.0* 8.3*  --  8.4* 8.2*     INR  Recent Labs   Lab 01/19/19  0430 " 01/18/19  0502 01/17/19  0530 01/16/19  0520   INR 1.76* 1.79* 2.04* 2.25*      Attestation:   I have reviewed today's relevant vital signs, notes, medications, labs and imaging.    Osman Madrid MD  [facility]phrology  [phone]

## 2019-01-19 NOTE — PLAN OF CARE
Pt restless and bipap dependent.  Pt tolerating bipap well, 14/5, 45% o2.  When masked removed pt quickly desats into low 80s.  Pt RR 24-32.  Pt slept poor during the night.  0.5 mg of ativan given and pt able to sleep a few hours.  Pt confused and oriented to self and place.  Pt follows commands. Pt only whispers.  Pt v paced 100%.  Adequate urine output.  Tube feeds held during night.  Pt with weak congested cough and needing encouragement to cough.

## 2019-01-19 NOTE — PROGRESS NOTES
Pt stable on Bipap overnight, no issues. Total mask placed, Bipap alarm levels set at 10. Will continue to follow.     Narinder Chavarria RT

## 2019-01-19 NOTE — PROGRESS NOTES
Third visit    Pt became tachypneic on HFNC  ABG 7.49/34/117    I discussed and examined pt with Dr Yu  Will keep her on HFNC    Concern for needing re intubation, then eventual trach(tika?)    I d/w pt's   I d/w Shania from CV surgery as well

## 2019-01-19 NOTE — PROGRESS NOTES
Patient is extubated breathing heavily tolerating tube feeding well no further diarrhea.  GI will continue to follow along.  Continue on current treatment.    David Parks MD

## 2019-01-19 NOTE — PROGRESS NOTES
[facility]  Cardiovascular and Thoracic Surgery Daily Note          Assessment and Plan:   POD#15 s/p Left groin cutdown with exposure of the femoral vessels with arterial cannulation for cardiopulmonary bypass. Right groin cutdown with exposure of the femoral vessels with the right femoral artery ultimately used for diagnostic angiography. 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 Visceral angiography. Repair of the bilateral femoral vessels. By Dr. Jose Winslow.     Main Plans for today:  1. Continue HFNC vs Bipap-low threshold to reintubate, will plan for trach on Monday if not able to remain extubated  2. Work with therapies  3. Diuresis at [facility] of nephrology   CVS: BP /50-60s, off norepi overnight; HR 70s/ 100% VVI paced. Nml EF, ASA, BB, coumadin for a.fib s/p AVN ablation and PPM placement on 1/11/19. On PO amiodarone INR  1.76 today  Pulm: Extubated per protocol and reintubated on POD #8 for resp distress. CT chest on 1/14 demonstrates RLL compressive atelectasis 2/2 elevated hemidiaphragm (present preoperatively). CXR/CT chest with fluid in fissures. Diuresing with assistance of nephrology. POD #7 left US guided thoracentesis with removal of 150 mL fluid. Extubated pod#14 to bipap per request of family before decided on tracheostomy, currently on HFNC, RR 20-30s.  Neuro: Grossly intact, weakly follows commands. Pain managed on current regimen, sedated with precedex drip.   Renal: IMAN -BL Creat ~0.8, 1.71 today; up 21 kg, Diuresis at [facility] nephrology - appreciate. R kidney atrophic  GI: Malnourished. Receiving tube feedings via OG. Postpyloric feeding tube placed pod#14On oral prednisone x 5 days for microscopic colitis, flex sig biopsy negative. CT abdomen on 1/14 did not demonstrate acute pathology.  1/12 and 1/14 negative for c. Dif  : Iliana present - strict I/O, limited  "mobility  Endo:  Preop A1C 5.3; managed on sliding scale insulin. Anticipate hyperglycemia 2/2 steroid. Goal BG <180  FEN:  Replace lytes as needed; Continue tube feedings after postpyloric placed  ID: Temp (24hrs), Av.4  F (36.9  C), Min:98  F (36.7  C), Max:99  F (37.2  C), Stress and steroid induced leukocytosis. WBC 13, No further antibiotics. Sputum + candida,on nystatin; BC NTD.  Heme: Acute blood loss anemia and thrombocytopenia: Hgb 7.5; Plt 228  Tubes/Drains:  Right internal jugular  Feeding tube  Barrientos  Proph:   BB, statin, ASA, subcutaneous heparin, coumadin, PCD, PPI  Dispo: Continue in ICU with close monitoring, Appreciate consulting services          Interval History:   Sitting up in bed, on HFNC, appears comfortable, fell asleep, denies pain, tolerating diet          Medications:       albumin human  12.5 g Intravenous Q12H     aspirin  81 mg Per Feeding Tube Daily     furosemide  40 mg Intravenous Q12H     influenza Vac Split High-Dose  0.5 mL Intramuscular Prior to discharge     insulin aspart  1-6 Units Subcutaneous Q4H     ipratropium - albuterol 0.5 mg/2.5 mg/3 mL  3 mL Nebulization 4x daily     lidocaine (PF)  10 mL Subcutaneous Once     meropenem  500 mg Intravenous Q8H     metoprolol  2.5 mg Intravenous Q6H     multivitamins w/minerals  15 mL Per Feeding Tube Daily     nystatin  500,000 Units Oral 4x Daily     pantoprazole  40 mg Oral QAM AC     QUEtiapine  50 mg Oral BID     sodium chloride (PF)  10 mL Intracatheter Q8H     sodium phosphate  1 enema Rectal Once     warfarin  4 mg Oral or Feeding Tube ONCE at 18:00     @MEDSPRN-@          Physical Exam:   Vitals were reviewed  Blood pressure 113/61, pulse 73, temperature 98.5  F (36.9  C), temperature source Oral, resp. rate (!) 36, height 1.626 m (5' 4\"), weight 98 kg (216 lb 0.8 oz), SpO2 94 %.  Rhythm: paced    Lungs: diminished bases    Cardiovascular: s1/s2, no m/r/g/    Abdomen: s/nt/nd    Extremeties: no LE edema    Incision: " cdi    CT: na    Weight:   Vitals:    01/15/19 0500 01/16/19 0400 01/17/19 0400 01/18/19 0500   Weight: 90.4 kg (199 lb 6.4 oz) 92.1 kg (203 lb) 93.3 kg (205 lb 11 oz) 98.7 kg (217 lb 9.5 oz)    01/19/19 0000   Weight: 98 kg (216 lb 0.8 oz)            Data:   Labs:   Lab Results   Component Value Date    WBC 13.0 01/19/2019     Lab Results   Component Value Date    RBC 2.57 01/19/2019     Lab Results   Component Value Date    HGB 7.5 01/19/2019     Lab Results   Component Value Date    HCT 22.6 01/19/2019     No components found for: MCT  Lab Results   Component Value Date    MCV 88 01/19/2019     Lab Results   Component Value Date    MCH 29.2 01/19/2019     Lab Results   Component Value Date    MCHC 33.2 01/19/2019     Lab Results   Component Value Date    RDW 17.9 01/19/2019     Lab Results   Component Value Date     01/19/2019       Last Basic Metabolic Panel:  Lab Results   Component Value Date     01/19/2019      Lab Results   Component Value Date    POTASSIUM 3.9 01/19/2019     Lab Results   Component Value Date    CHLORIDE 102 01/19/2019     Lab Results   Component Value Date    BESS 8.3 01/19/2019     Lab Results   Component Value Date    CO2 27 01/19/2019     Lab Results   Component Value Date    BUN 73 01/19/2019     Lab Results   Component Value Date    CR 1.71 01/19/2019     Lab Results   Component Value Date    GLC 96 01/19/2019       CXR: IMPRESSION: Tip of the feeding tube projects over the location of  distal duodenum near the ligament of Treitz. Visualized bowel gas  pattern within normal limits. Cardiac device lead in right ventricle,  sternotomy wires.    Shania June PA-C  Pager #: [phone]

## 2019-01-19 NOTE — PROVIDER NOTIFICATION
"Pt has informed two family members she want to \"die\".  She is aware of her name and she is in [facility].   Pt. Wanted to talk to  And Jd Faustin CNP talked to her at 1845 and informed her will discuss in AM with family.   She stated she did not want \"tube\" to breath again and agreed to BIpap tonight.   She talked cliff whisper. Pt has been cooperative and able to make her needs known.  Denies pain  "

## 2019-01-20 LAB
ANION GAP SERPL CALCULATED.3IONS-SCNC: 10 MMOL/L (ref 3–14)
BASE EXCESS BLDA CALC-SCNC: 3.3 MMOL/L
BUN SERPL-MCNC: 74 MG/DL (ref 7–30)
CALCIUM SERPL-MCNC: 8.5 MG/DL (ref 8.5–10.1)
CHLORIDE SERPL-SCNC: 100 MMOL/L (ref 94–109)
CO2 SERPL-SCNC: 26 MMOL/L (ref 20–32)
CREAT SERPL-MCNC: 1.56 MG/DL (ref 0.52–1.04)
ERYTHROCYTE [DISTWIDTH] IN BLOOD BY AUTOMATED COUNT: 18 % (ref 10–15)
GFR SERPL CREATININE-BSD FRML MDRD: 32 ML/MIN/{1.73_M2}
GLUCOSE BLDC GLUCOMTR-MCNC: 111 MG/DL (ref 70–99)
GLUCOSE BLDC GLUCOMTR-MCNC: 123 MG/DL (ref 70–99)
GLUCOSE BLDC GLUCOMTR-MCNC: 126 MG/DL (ref 70–99)
GLUCOSE BLDC GLUCOMTR-MCNC: 128 MG/DL (ref 70–99)
GLUCOSE BLDC GLUCOMTR-MCNC: 135 MG/DL (ref 70–99)
GLUCOSE SERPL-MCNC: 120 MG/DL (ref 70–99)
HCO3 BLD-SCNC: 27 MMOL/L (ref 21–28)
HCT VFR BLD AUTO: 23.7 % (ref 35–47)
HGB BLD-MCNC: 7.8 G/DL (ref 11.7–15.7)
INR PPP: 1.71 (ref 0.86–1.14)
MCH RBC QN AUTO: 29.4 PG (ref 26.5–33)
MCHC RBC AUTO-ENTMCNC: 32.9 G/DL (ref 31.5–36.5)
MCV RBC AUTO: 89 FL (ref 78–100)
O2/TOTAL GAS SETTING VFR VENT: ABNORMAL %
OXYHGB MFR BLD: 99 % (ref 92–100)
PCO2 BLD: 35 MM HG (ref 35–45)
PH BLD: 7.49 PH (ref 7.35–7.45)
PLATELET # BLD AUTO: 322 10E9/L (ref 150–450)
PO2 BLD: 134 MM HG (ref 80–105)
POTASSIUM SERPL-SCNC: 3.6 MMOL/L (ref 3.4–5.3)
RBC # BLD AUTO: 2.65 10E12/L (ref 3.8–5.2)
SODIUM SERPL-SCNC: 136 MMOL/L (ref 133–144)
WBC # BLD AUTO: 16.2 10E9/L (ref 4–11)

## 2019-01-20 PROCEDURE — 85027 COMPLETE CBC AUTOMATED: CPT | Performed by: PHYSICIAN ASSISTANT

## 2019-01-20 PROCEDURE — 25000132 ZZH RX MED GY IP 250 OP 250 PS 637: Performed by: PHYSICIAN ASSISTANT

## 2019-01-20 PROCEDURE — 25000132 ZZH RX MED GY IP 250 OP 250 PS 637: Performed by: THORACIC SURGERY (CARDIOTHORACIC VASCULAR SURGERY)

## 2019-01-20 PROCEDURE — 36600 WITHDRAWAL OF ARTERIAL BLOOD: CPT

## 2019-01-20 PROCEDURE — 40000275 ZZH STATISTIC RCP TIME EA 10 MIN

## 2019-01-20 PROCEDURE — 94640 AIRWAY INHALATION TREATMENT: CPT | Mod: 76

## 2019-01-20 PROCEDURE — 25000125 ZZHC RX 250: Performed by: NURSE PRACTITIONER

## 2019-01-20 PROCEDURE — 82805 BLOOD GASES W/O2 SATURATION: CPT | Performed by: INTERNAL MEDICINE

## 2019-01-20 PROCEDURE — 25000128 H RX IP 250 OP 636

## 2019-01-20 PROCEDURE — 25000132 ZZH RX MED GY IP 250 OP 250 PS 637: Performed by: STUDENT IN AN ORGANIZED HEALTH CARE EDUCATION/TRAINING PROGRAM

## 2019-01-20 PROCEDURE — 85610 PROTHROMBIN TIME: CPT | Performed by: PHYSICIAN ASSISTANT

## 2019-01-20 PROCEDURE — 25000132 ZZH RX MED GY IP 250 OP 250 PS 637: Performed by: NURSE PRACTITIONER

## 2019-01-20 PROCEDURE — 25000128 H RX IP 250 OP 636: Performed by: INTERNAL MEDICINE

## 2019-01-20 PROCEDURE — 94640 AIRWAY INHALATION TREATMENT: CPT

## 2019-01-20 PROCEDURE — 27210437 ZZH NUTRITION PRODUCT SEMIELEM INTERMED LITER

## 2019-01-20 PROCEDURE — 20000003 ZZH R&B ICU

## 2019-01-20 PROCEDURE — 94660 CPAP INITIATION&MGMT: CPT

## 2019-01-20 PROCEDURE — 25000132 ZZH RX MED GY IP 250 OP 250 PS 637

## 2019-01-20 PROCEDURE — P9047 ALBUMIN (HUMAN), 25%, 50ML: HCPCS | Performed by: INTERNAL MEDICINE

## 2019-01-20 PROCEDURE — 80048 BASIC METABOLIC PNL TOTAL CA: CPT | Performed by: PHYSICIAN ASSISTANT

## 2019-01-20 PROCEDURE — 00000146 ZZHCL STATISTIC GLUCOSE BY METER IP

## 2019-01-20 PROCEDURE — 25000132 ZZH RX MED GY IP 250 OP 250 PS 637: Performed by: INTERNAL MEDICINE

## 2019-01-20 RX ORDER — WARFARIN SODIUM 6 MG/1
6 TABLET ORAL
Status: COMPLETED | OUTPATIENT
Start: 2019-01-20 | End: 2019-01-20

## 2019-01-20 RX ADMIN — FUROSEMIDE 40 MG: 10 INJECTION, SOLUTION INTRAVENOUS at 10:33

## 2019-01-20 RX ADMIN — Medication 40 MG: at 08:56

## 2019-01-20 RX ADMIN — ASPIRIN 81 MG 81 MG: 81 TABLET ORAL at 08:57

## 2019-01-20 RX ADMIN — IPRATROPIUM BROMIDE AND ALBUTEROL SULFATE 3 ML: .5; 2.5 SOLUTION RESPIRATORY (INHALATION) at 12:22

## 2019-01-20 RX ADMIN — IPRATROPIUM BROMIDE AND ALBUTEROL SULFATE 3 ML: .5; 2.5 SOLUTION RESPIRATORY (INHALATION) at 19:48

## 2019-01-20 RX ADMIN — METOPROLOL TARTRATE 2.5 MG: 5 INJECTION, SOLUTION INTRAVENOUS at 10:33

## 2019-01-20 RX ADMIN — WARFARIN SODIUM 6 MG: 6 TABLET ORAL at 18:39

## 2019-01-20 RX ADMIN — POTASSIUM CHLORIDE 20 MEQ: 1.5 POWDER, FOR SOLUTION ORAL at 06:03

## 2019-01-20 RX ADMIN — MULTIVITAMIN 15 ML: LIQUID ORAL at 08:57

## 2019-01-20 RX ADMIN — METOPROLOL TARTRATE 2.5 MG: 5 INJECTION, SOLUTION INTRAVENOUS at 22:14

## 2019-01-20 RX ADMIN — IPRATROPIUM BROMIDE AND ALBUTEROL SULFATE 3 ML: .5; 2.5 SOLUTION RESPIRATORY (INHALATION) at 08:00

## 2019-01-20 RX ADMIN — NYSTATIN 500000 UNITS: 500000 SUSPENSION ORAL at 06:03

## 2019-01-20 RX ADMIN — NYSTATIN 500000 UNITS: 500000 SUSPENSION ORAL at 01:10

## 2019-01-20 RX ADMIN — NYSTATIN 500000 UNITS: 500000 SUSPENSION ORAL at 18:39

## 2019-01-20 RX ADMIN — IPRATROPIUM BROMIDE AND ALBUTEROL SULFATE 3 ML: .5; 2.5 SOLUTION RESPIRATORY (INHALATION) at 16:21

## 2019-01-20 RX ADMIN — SODIUM CHLORIDE: 9 INJECTION, SOLUTION INTRAVENOUS at 08:56

## 2019-01-20 RX ADMIN — FUROSEMIDE 40 MG: 10 INJECTION, SOLUTION INTRAVENOUS at 22:14

## 2019-01-20 RX ADMIN — QUETIAPINE 25 MG: 25 TABLET ORAL at 03:41

## 2019-01-20 RX ADMIN — QUETIAPINE 50 MG: 25 TABLET ORAL at 08:57

## 2019-01-20 RX ADMIN — ALBUMIN HUMAN 12.5 G: 0.25 SOLUTION INTRAVENOUS at 08:56

## 2019-01-20 RX ADMIN — NYSTATIN 500000 UNITS: 500000 SUSPENSION ORAL at 13:51

## 2019-01-20 RX ADMIN — QUETIAPINE 50 MG: 25 TABLET ORAL at 20:46

## 2019-01-20 RX ADMIN — METOPROLOL TARTRATE 2.5 MG: 5 INJECTION, SOLUTION INTRAVENOUS at 16:43

## 2019-01-20 RX ADMIN — ALBUMIN HUMAN 12.5 G: 0.25 SOLUTION INTRAVENOUS at 20:51

## 2019-01-20 RX ADMIN — ACETAMINOPHEN 650 MG: 325 TABLET, FILM COATED ORAL at 08:56

## 2019-01-20 RX ADMIN — METOPROLOL TARTRATE 2.5 MG: 5 INJECTION, SOLUTION INTRAVENOUS at 03:46

## 2019-01-20 ASSESSMENT — ACTIVITIES OF DAILY LIVING (ADL)
ADLS_ACUITY_SCORE: 16

## 2019-01-20 ASSESSMENT — MIFFLIN-ST. JEOR: SCORE: 1478

## 2019-01-20 NOTE — PROGRESS NOTES
Renal Medicine Progress Note    SHORTHAND KEY FOR MY NOTES:  c = with, s = without, p = after, a = before, x = except, asx = asymptomatic, tx = transplant or treatment, sx = symptoms or symptomatic, cx = canceled or culture, rxn = reaction, yday = yesterday, nl = normal, abx = antibiotics, fxn = function, dx = diagnosis, dz = disease, m/h = melena/hematochezia, c/d/l/ha = cramping/dizziness/lightheadedness/headache, d/c = discharge or diarrhea/constipation, f/c/n/v = fevers/chills/nausea/vomiting, cp/sob = chest pain/shortness of breath.         Assessment/Plan:     1.  Non-oliguric IMAN/CKD.  Pt's cr is a bit better today ~1.6.  Responding to the IV alb/furos combo c good uo.  No uremic sx.  Still a bit TBV up, but third-spacing.    A.  Continue IV alb/furos bid.  Watch for signs of contraction.  B.  Follow labs, uo.      2.  S/p aortic dissection/repair.  Stable.  A.  Per CV Surg.    3.  SOB/hypoxia/pneumonia.  Pt still requiring supp o2.  Breathing is much better today.   A.  Continue high flow o2.    4.  Anemia. Hb is stable in the mid-7s.  Hb is a little higher today from hemoconcentration.  No signs of bleeding.    A.  Follow labs.  B.  Transfuse prn.     5.  FEN.   Na is nl.  Getting free water 200 q 2h.    A.  Decrease free water to 100 q 2h.  B.  Follow Na.        Interval History:     Pt is awake and doing much better than yday.  No f/c/n/v.  She feels a little claustrophobic c the high-flow nc.  Good uo noted via coello.  Denies any d/l when moving from the bed to the chair.  Breathing is ok, no cp/abd pain.          Medications and Allergies:       albumin human  12.5 g Intravenous Q12H     aspirin  81 mg Per Feeding Tube Daily     furosemide  40 mg Intravenous Q12H     influenza Vac Split High-Dose  0.5 mL Intramuscular Prior to discharge     insulin aspart  1-6 Units Subcutaneous Q4H     ipratropium - albuterol 0.5 mg/2.5 mg/3 mL  3 mL Nebulization 4x daily     lidocaine (PF)  10 mL Subcutaneous Once  "    metoprolol  2.5 mg Intravenous Q6H     multivitamins w/minerals  15 mL Per Feeding Tube Daily     nystatin  500,000 Units Oral 4x Daily     pantoprazole  40 mg Oral QAM AC     QUEtiapine  50 mg Oral BID     sodium chloride (PF)  10 mL Intracatheter Q8H     sodium phosphate  1 enema Rectal Once     warfarin  6 mg Oral or Feeding Tube ONCE at 18:00     Allergies   Allergen Reactions     Amoxicillin Swelling     Ciprofloxacin Swelling          Physical Exam:     Vitals were reviewed  Heart Rate: 69, Blood pressure 144/70, pulse 70, temperature 97.1  F (36.2  C), temperature source Axillary, resp. rate 24, height 1.626 m (5' 4\"), weight 100.3 kg (221 lb 1.9 oz), SpO2 95 %.  Wt Readings from Last 3 Encounters:   01/20/19 100.3 kg (221 lb 1.9 oz)   09/14/08 80.7 kg (178 lb)     Intake/Output Summary (Last 24 hours) at 1/20/2019 0956  Last data filed at 1/20/2019 0856  Gross per 24 hour   Intake 3330 ml   Output 2505 ml   Net 825 ml     GENERAL APPEARANCE: sitting in chair, using her mobile phone, awake, alert, interactive  HEENT:  Eyes/ears/nose/neck grossly nl x + high flow nc o2  RESP: + few wheezes, rhonchi - better than yday  CV: RRR c 2/6 m, nl S1/S2, + pacer; sternal incision looks fine  ABDOMEN: o/s/nt/nd  EXTREMITIES/SKIN: 1+ edema  NEURO:  eyes open, moves ext, follows all commands properly  LINES:  + coello c yellow urine; + RIJ 3-lumen         Data:     CBC RESULTS:     Recent Labs   Lab 01/20/19  0400 01/19/19  0430 01/18/19  0502 01/17/19  0530 01/16/19  0520 01/15/19  0446   WBC 16.2* 13.0* 12.5* 14.9* 12.1* 16.7*   RBC 2.65* 2.57* 2.48* 2.57* 2.59* 2.80*   HGB 7.8* 7.5* 7.2* 7.6* 7.6* 8.3*   HCT 23.7* 22.6* 21.9* 22.5* 22.4* 24.1*    228 193  Canceled, Test credited 187 193 237     Basic Metabolic Panel:  Recent Labs   Lab 01/20/19  0400 01/19/19  0430 01/18/19  0502 01/17/19  0530 01/16/19  0520 01/15/19  0955 01/15/19  0446    138 139 143 147*  --  149*   POTASSIUM 3.6 3.9 4.4 3.9 3.7 3.6 " 3.1*   CHLORIDE 100 102 105 107 110*  --  110*   CO2 26 27 24 28 28  --  30   BUN 74* 73* 69* 62* 60*  --  59*   CR 1.56* 1.71* 1.73* 1.68* 1.68*  --  1.95*   * 96 120* 116* 131*  --  115*   BESS 8.5 8.3* 8.1* 8.0* 8.3*  --  8.4*     INR  Recent Labs   Lab 01/20/19  0400 01/19/19  0430 01/18/19  0502 01/17/19  0530   INR 1.71* 1.76* 1.79* 2.04*      Attestation:   I have reviewed today's relevant vital signs, notes, medications, labs and imaging.    Osman Madrid MD  [facility]  [phone]

## 2019-01-20 NOTE — PROGRESS NOTES
Today, pt was on HFNC 35 lpm and 45% tolerated only 6 hours and then resumed with BiPAP 14/7 and 40% tolerating well. Neb tx were given as ordered, BBS exp wheezy, SpO2 98%. RT will continue to follow the pt.     RT [place].

## 2019-01-20 NOTE — PLAN OF CARE
Pt alert and oriented, following commands. Confused at times to place and situation. Pt agitated and pulling at lines. Reorienting the patient was successful. VS stable throughout night. Coarse lung sounds. Multiple BM. TF @ 35 with 200 q2hr flushes. Potassium replaced. Will continue to monitor

## 2019-01-20 NOTE — PROGRESS NOTES
ICU Note:    Patient has stabilized from a respiratory standpoint overnight. Remaining on high flow oxygen without any need for Bipap. Mental status is also better. Will continue to follow peripherally while here but hope that will continue to wean down on oxygen over the next 24 hours and that she may be able to leave ICU in next 24 to 48 hours.     Odette Yu MD

## 2019-01-20 NOTE — PLAN OF CARE
Neuro:  Patient Cam +, oriented to self.  Continue Seroquel and reorientation as well as promoting sleeping/wake cycle.   Otherwise moves all extremities.     Pulm:  LS coarse/diminished.  Patient tolerated HIFO NC for aprox 6hrs today then resumed bipap therapy.  RR elevated.     Cardiac:  Afib CVR   GI:  TF resumed, patient had moderate loose BM at end of shift.  : Barrientos, good UOP.  Diuresed today with Albumin/Lasix        Continue supportive cares.  Family and patient updated bedside on plan of care.

## 2019-01-20 NOTE — PROGRESS NOTES
[facility]  Cardiovascular and Thoracic Surgery Daily Note          Assessment and Plan:   POD#16 s/p Left groin cutdown with exposure of the femoral vessels with arterial cannulation for cardiopulmonary bypass. Right groin cutdown with exposure of the femoral vessels with the right femoral artery ultimately used for diagnostic angiography. 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 Visceral angiography. Repair of the bilateral femoral vessels. By Dr. Jose Winslow.     Main Plans for today:  1. Continue HFNC vs Bipap: stable, continue nebs  2. Work with therapies  3. Diuresis at [facility] of nephrology-wt up and net positive yesterday   CVS: BP /50-60s, off norepi; HR 70s/ 100% VVI paced. Nml EF, ASA, BB, coumadin for a.fib s/p AVN ablation and PPM placement on 1/11/19. INR  1.71 today  Pulm: Extubated per protocol and reintubated on POD #8 for resp distress. CT chest on 1/14 demonstrates RLL compressive atelectasis 2/2 elevated hemidiaphragm (present preoperatively). CXR/CT chest with fluid in fissures. Diuresing with assistance of nephrology. POD #7 left US guided thoracentesis with removal of 150 mL fluid. Extubated pod#14 to bipap per request of family before decided on tracheostomy, currently on HFNC, RR 20-30s.  Neuro: Grossly intact, weakly follows commands. Pain managed on current regimen, sedated with precedex drip.   Renal: IMAN -BL Creat ~0.8, 1.56 today; up 23 kg, Diuresis at [facility] nephrology - appreciate. R kidney atrophic  GI: Malnourished. Receiving tube feedings via OG. Postpyloric feeding tube placed pod#14On oral prednisone x 5 days for microscopic colitis, flex sig biopsy negative. CT abdomen on 1/14 did not demonstrate acute pathology.  1/12 and 1/14 negative for c. Dif  : Barrientos present - strict I/O, limited mobility  Endo:  Preop A1C 5.3; managed on sliding scale  "insulin. Anticipate hyperglycemia 2/2 steroid. Goal BG <180  FEN:  Replace lytes as needed; Continue tube feedings after postpyloric placed  ID: Temp (24hrs), Av.5  F (36.9  C), Min:97.6  F (36.4  C), Max:99.1  F (37.3  C), Stress and steroid induced leukocytosis. WBC 13<16.2, No further antibiotics. Sputum + candida,on nystatin; BC NTD.  Heme: Acute blood loss anemia and thrombocytopenia: Hgb 7.8<7.5; Plt 322  Tubes/Drains:  Right internal jugular  Feeding tube  Barrientos  Proph:   BB, statin, ASA, subcutaneous heparin, coumadin, PCD, PPI  Dispo: Continue in ICU with close monitoring, Appreciate consulting services          Interval History:   Sitting up in chair, more oriented this am, breathing stable, denies pain, much improved from yesterday         Medications:       albumin human  12.5 g Intravenous Q12H     aspirin  81 mg Per Feeding Tube Daily     furosemide  40 mg Intravenous Q12H     influenza Vac Split High-Dose  0.5 mL Intramuscular Prior to discharge     insulin aspart  1-6 Units Subcutaneous Q4H     ipratropium - albuterol 0.5 mg/2.5 mg/3 mL  3 mL Nebulization 4x daily     lidocaine (PF)  10 mL Subcutaneous Once     metoprolol  2.5 mg Intravenous Q6H     multivitamins w/minerals  15 mL Per Feeding Tube Daily     nystatin  500,000 Units Oral 4x Daily     pantoprazole  40 mg Oral QAM AC     QUEtiapine  50 mg Oral BID     sodium chloride (PF)  10 mL Intracatheter Q8H     sodium phosphate  1 enema Rectal Once     @MEDSPRN-@          Physical Exam:   Vitals were reviewed  Blood pressure 131/72, pulse 70, temperature 97.6  F (36.4  C), temperature source Axillary, resp. rate 27, height 1.626 m (5' 4\"), weight 100.3 kg (221 lb 1.9 oz), SpO2 96 %.  Rhythm: paced    Lungs: course, expiratory wheezing    Cardiovascular: s1/s2, no m/r/g    Abdomen: s/nt/nd    Extremeties: no LE edema    Incision: cdi    CT: na    Weight:   Vitals:    19 0400 19 0400 19 0500 19 0000   Weight: 92.1 kg (203 " lb) 93.3 kg (205 lb 11 oz) 98.7 kg (217 lb 9.5 oz) 98 kg (216 lb 0.8 oz)    01/20/19 0600   Weight: 100.3 kg (221 lb 1.9 oz)            Data:   Labs:   Lab Results   Component Value Date    WBC 16.2 01/20/2019     Lab Results   Component Value Date    RBC 2.65 01/20/2019     Lab Results   Component Value Date    HGB 7.8 01/20/2019     Lab Results   Component Value Date    HCT 23.7 01/20/2019     No components found for: MCT  Lab Results   Component Value Date    MCV 89 01/20/2019     Lab Results   Component Value Date    MCH 29.4 01/20/2019     Lab Results   Component Value Date    MCHC 32.9 01/20/2019     Lab Results   Component Value Date    RDW 18.0 01/20/2019     Lab Results   Component Value Date     01/20/2019       Last Basic Metabolic Panel:  Lab Results   Component Value Date     01/20/2019      Lab Results   Component Value Date    POTASSIUM 3.6 01/20/2019     Lab Results   Component Value Date    CHLORIDE 100 01/20/2019     Lab Results   Component Value Date    BESS 8.5 01/20/2019     Lab Results   Component Value Date    CO2 26 01/20/2019     Lab Results   Component Value Date    BUN 74 01/20/2019     Lab Results   Component Value Date    CR 1.56 01/20/2019     Lab Results   Component Value Date     01/20/2019       Shania June PA-C  Pager #: [phone]

## 2019-01-20 NOTE — PROGRESS NOTES
Patient on BiPAP 14/7 40% overnight. spo2 99%. Gel pad/mepilex on. BBS coarse, diminished. RT will continue to follow.

## 2019-01-21 ENCOUNTER — APPOINTMENT (OUTPATIENT)
Dept: SPEECH THERAPY | Facility: CLINIC | Age: 79
DRG: 003 | End: 2019-01-21
Payer: COMMERCIAL

## 2019-01-21 ENCOUNTER — APPOINTMENT (OUTPATIENT)
Dept: PHYSICAL THERAPY | Facility: CLINIC | Age: 79
DRG: 003 | End: 2019-01-21
Payer: COMMERCIAL

## 2019-01-21 ENCOUNTER — APPOINTMENT (OUTPATIENT)
Dept: GENERAL RADIOLOGY | Facility: CLINIC | Age: 79
DRG: 003 | End: 2019-01-21
Payer: COMMERCIAL

## 2019-01-21 LAB
ALBUMIN SERPL-MCNC: 3.6 G/DL (ref 3.4–5)
ALP SERPL-CCNC: 73 U/L (ref 40–150)
ALT SERPL W P-5'-P-CCNC: 22 U/L (ref 0–50)
ANION GAP SERPL CALCULATED.3IONS-SCNC: 7 MMOL/L (ref 3–14)
AST SERPL W P-5'-P-CCNC: 15 U/L (ref 0–45)
BILIRUB SERPL-MCNC: 1 MG/DL (ref 0.2–1.3)
BUN SERPL-MCNC: 72 MG/DL (ref 7–30)
CALCIUM SERPL-MCNC: 8.3 MG/DL (ref 8.5–10.1)
CHLORIDE SERPL-SCNC: 101 MMOL/L (ref 94–109)
CO2 SERPL-SCNC: 30 MMOL/L (ref 20–32)
CREAT SERPL-MCNC: 1.27 MG/DL (ref 0.52–1.04)
ERYTHROCYTE [DISTWIDTH] IN BLOOD BY AUTOMATED COUNT: 17.8 % (ref 10–15)
GFR SERPL CREATININE-BSD FRML MDRD: 41 ML/MIN/{1.73_M2}
GLUCOSE BLDC GLUCOMTR-MCNC: 112 MG/DL (ref 70–99)
GLUCOSE BLDC GLUCOMTR-MCNC: 117 MG/DL (ref 70–99)
GLUCOSE BLDC GLUCOMTR-MCNC: 119 MG/DL (ref 70–99)
GLUCOSE BLDC GLUCOMTR-MCNC: 124 MG/DL (ref 70–99)
GLUCOSE BLDC GLUCOMTR-MCNC: 133 MG/DL (ref 70–99)
GLUCOSE SERPL-MCNC: 112 MG/DL (ref 70–99)
HCT VFR BLD AUTO: 24.3 % (ref 35–47)
HGB BLD-MCNC: 8 G/DL (ref 11.7–15.7)
INR PPP: 2.16 (ref 0.86–1.14)
MAGNESIUM SERPL-MCNC: 2.3 MG/DL (ref 1.6–2.3)
MCH RBC QN AUTO: 29.4 PG (ref 26.5–33)
MCHC RBC AUTO-ENTMCNC: 32.9 G/DL (ref 31.5–36.5)
MCV RBC AUTO: 89 FL (ref 78–100)
PHOSPHATE SERPL-MCNC: 3.5 MG/DL (ref 2.5–4.5)
PLATELET # BLD AUTO: 346 10E9/L (ref 150–450)
POTASSIUM SERPL-SCNC: 3.4 MMOL/L (ref 3.4–5.3)
PROT SERPL-MCNC: 5.8 G/DL (ref 6.8–8.8)
RBC # BLD AUTO: 2.72 10E12/L (ref 3.8–5.2)
SODIUM SERPL-SCNC: 138 MMOL/L (ref 133–144)
TRIGL SERPL-MCNC: 131 MG/DL
WBC # BLD AUTO: 14.8 10E9/L (ref 4–11)

## 2019-01-21 PROCEDURE — 25000132 ZZH RX MED GY IP 250 OP 250 PS 637: Performed by: NURSE PRACTITIONER

## 2019-01-21 PROCEDURE — 25000132 ZZH RX MED GY IP 250 OP 250 PS 637

## 2019-01-21 PROCEDURE — 83735 ASSAY OF MAGNESIUM: CPT | Performed by: INTERNAL MEDICINE

## 2019-01-21 PROCEDURE — 85610 PROTHROMBIN TIME: CPT | Performed by: INTERNAL MEDICINE

## 2019-01-21 PROCEDURE — 00000146 ZZHCL STATISTIC GLUCOSE BY METER IP

## 2019-01-21 PROCEDURE — 94640 AIRWAY INHALATION TREATMENT: CPT | Mod: 76

## 2019-01-21 PROCEDURE — 40000225 ZZH STATISTIC SLP WARD VISIT: Performed by: SPEECH-LANGUAGE PATHOLOGIST

## 2019-01-21 PROCEDURE — 25000132 ZZH RX MED GY IP 250 OP 250 PS 637: Performed by: THORACIC SURGERY (CARDIOTHORACIC VASCULAR SURGERY)

## 2019-01-21 PROCEDURE — 25000128 H RX IP 250 OP 636: Performed by: INTERNAL MEDICINE

## 2019-01-21 PROCEDURE — 40000986 XR ABDOMEN PORT 1 VW

## 2019-01-21 PROCEDURE — 85027 COMPLETE CBC AUTOMATED: CPT | Performed by: INTERNAL MEDICINE

## 2019-01-21 PROCEDURE — 97110 THERAPEUTIC EXERCISES: CPT | Mod: GP

## 2019-01-21 PROCEDURE — 20000003 ZZH R&B ICU

## 2019-01-21 PROCEDURE — 97530 THERAPEUTIC ACTIVITIES: CPT | Mod: GP

## 2019-01-21 PROCEDURE — 92610 EVALUATE SWALLOWING FUNCTION: CPT | Mod: GN | Performed by: SPEECH-LANGUAGE PATHOLOGIST

## 2019-01-21 PROCEDURE — 80053 COMPREHEN METABOLIC PANEL: CPT | Performed by: INTERNAL MEDICINE

## 2019-01-21 PROCEDURE — 94640 AIRWAY INHALATION TREATMENT: CPT

## 2019-01-21 PROCEDURE — 84100 ASSAY OF PHOSPHORUS: CPT | Performed by: INTERNAL MEDICINE

## 2019-01-21 PROCEDURE — 25000125 ZZHC RX 250: Performed by: NURSE PRACTITIONER

## 2019-01-21 PROCEDURE — 40000275 ZZH STATISTIC RCP TIME EA 10 MIN

## 2019-01-21 PROCEDURE — P9047 ALBUMIN (HUMAN), 25%, 50ML: HCPCS | Performed by: INTERNAL MEDICINE

## 2019-01-21 PROCEDURE — 27210437 ZZH NUTRITION PRODUCT SEMIELEM INTERMED LITER

## 2019-01-21 PROCEDURE — 99291 CRITICAL CARE FIRST HOUR: CPT | Performed by: NURSE PRACTITIONER

## 2019-01-21 PROCEDURE — 94660 CPAP INITIATION&MGMT: CPT

## 2019-01-21 PROCEDURE — 25000132 ZZH RX MED GY IP 250 OP 250 PS 637: Performed by: INTERNAL MEDICINE

## 2019-01-21 PROCEDURE — 40000193 ZZH STATISTIC PT WARD VISIT

## 2019-01-21 PROCEDURE — 84478 ASSAY OF TRIGLYCERIDES: CPT | Performed by: INTERNAL MEDICINE

## 2019-01-21 PROCEDURE — 25000132 ZZH RX MED GY IP 250 OP 250 PS 637: Performed by: STUDENT IN AN ORGANIZED HEALTH CARE EDUCATION/TRAINING PROGRAM

## 2019-01-21 RX ORDER — WARFARIN SODIUM 2.5 MG/1
2.5 TABLET ORAL
Status: COMPLETED | OUTPATIENT
Start: 2019-01-21 | End: 2019-01-21

## 2019-01-21 RX ORDER — LIDOCAINE 40 MG/G
CREAM TOPICAL
Status: DISCONTINUED | OUTPATIENT
Start: 2019-01-21 | End: 2019-01-21

## 2019-01-21 RX ADMIN — IPRATROPIUM BROMIDE AND ALBUTEROL SULFATE 3 ML: .5; 2.5 SOLUTION RESPIRATORY (INHALATION) at 12:08

## 2019-01-21 RX ADMIN — IPRATROPIUM BROMIDE AND ALBUTEROL SULFATE 3 ML: .5; 2.5 SOLUTION RESPIRATORY (INHALATION) at 19:40

## 2019-01-21 RX ADMIN — POTASSIUM CHLORIDE 20 MEQ: 1.5 POWDER, FOR SOLUTION ORAL at 05:25

## 2019-01-21 RX ADMIN — METOPROLOL TARTRATE 2.5 MG: 5 INJECTION, SOLUTION INTRAVENOUS at 05:25

## 2019-01-21 RX ADMIN — NYSTATIN 500000 UNITS: 500000 SUSPENSION ORAL at 00:20

## 2019-01-21 RX ADMIN — QUETIAPINE 50 MG: 25 TABLET ORAL at 08:27

## 2019-01-21 RX ADMIN — METOPROLOL TARTRATE 2.5 MG: 5 INJECTION, SOLUTION INTRAVENOUS at 21:33

## 2019-01-21 RX ADMIN — WARFARIN SODIUM 2.5 MG: 2.5 TABLET ORAL at 18:06

## 2019-01-21 RX ADMIN — IPRATROPIUM BROMIDE AND ALBUTEROL SULFATE 3 ML: .5; 2.5 SOLUTION RESPIRATORY (INHALATION) at 17:13

## 2019-01-21 RX ADMIN — NYSTATIN 500000 UNITS: 500000 SUSPENSION ORAL at 11:20

## 2019-01-21 RX ADMIN — ASPIRIN 81 MG 81 MG: 81 TABLET ORAL at 08:27

## 2019-01-21 RX ADMIN — NYSTATIN 500000 UNITS: 500000 SUSPENSION ORAL at 18:07

## 2019-01-21 RX ADMIN — MULTIVITAMIN 15 ML: LIQUID ORAL at 08:27

## 2019-01-21 RX ADMIN — NYSTATIN 500000 UNITS: 500000 SUSPENSION ORAL at 23:45

## 2019-01-21 RX ADMIN — QUETIAPINE 25 MG: 25 TABLET ORAL at 16:10

## 2019-01-21 RX ADMIN — NYSTATIN 500000 UNITS: 500000 SUSPENSION ORAL at 05:25

## 2019-01-21 RX ADMIN — INSULIN ASPART 1 UNITS: 100 INJECTION, SOLUTION INTRAVENOUS; SUBCUTANEOUS at 23:50

## 2019-01-21 RX ADMIN — FUROSEMIDE 40 MG: 10 INJECTION, SOLUTION INTRAVENOUS at 21:34

## 2019-01-21 RX ADMIN — METOPROLOL TARTRATE 2.5 MG: 5 INJECTION, SOLUTION INTRAVENOUS at 16:09

## 2019-01-21 RX ADMIN — METOPROLOL TARTRATE 2.5 MG: 5 INJECTION, SOLUTION INTRAVENOUS at 11:18

## 2019-01-21 RX ADMIN — FUROSEMIDE 40 MG: 10 INJECTION, SOLUTION INTRAVENOUS at 11:15

## 2019-01-21 RX ADMIN — IPRATROPIUM BROMIDE AND ALBUTEROL SULFATE 3 ML: .5; 2.5 SOLUTION RESPIRATORY (INHALATION) at 08:04

## 2019-01-21 RX ADMIN — QUETIAPINE 50 MG: 25 TABLET ORAL at 21:34

## 2019-01-21 RX ADMIN — ALBUMIN HUMAN 12.5 G: 0.25 SOLUTION INTRAVENOUS at 09:21

## 2019-01-21 RX ADMIN — Medication 40 MG: at 08:27

## 2019-01-21 RX ADMIN — Medication 5 MG: at 21:34

## 2019-01-21 RX ADMIN — ALBUMIN HUMAN 12.5 G: 0.25 SOLUTION INTRAVENOUS at 21:33

## 2019-01-21 ASSESSMENT — ACTIVITIES OF DAILY LIVING (ADL)
ADLS_ACUITY_SCORE: 16
ADLS_ACUITY_SCORE: 18
ADLS_ACUITY_SCORE: 16
ADLS_ACUITY_SCORE: 16

## 2019-01-21 ASSESSMENT — MIFFLIN-ST. JEOR: SCORE: 1467

## 2019-01-21 NOTE — PLAN OF CARE
Pt alert & oriented to person and place. Confused at what year it is. Delirious during middle of night, requesting to go home and get out of bed. On BIPAP through night. VS stable. Diminished lung sounds. Lasix given. Good UO. Potassium replaced this morning.

## 2019-01-21 NOTE — PLAN OF CARE
Neuro:  Patient Cam +, oriented to self/place.  Continue Seroquel and reorientation as well as promoting sleeping/wake cycle.   Otherwise moves all extremities. Mentation improving.   Pulm:  LS coarse/diminished.  Patient tolerated HIFLO NC for duration of shift.      Cardiac:  Afib CVR   GI:  TF continues, patient had moderate loose BM.  Free water decreased today see order.   : Barrientos, good UOP.  Diuresed today with Albumin/Lasix        Continue supportive cares.  Family and patient updated bedside on plan of care.

## 2019-01-21 NOTE — PROGRESS NOTES
01/21/19 1510   General Information   Onset Date 01/04/19   Start of Care Date 01/21/19   Referring Physician Jd Faustin NP   Patient Profile Review/OT: Additional Occupational Profile Info See Profile for full history and prior level of function   Patient/Family Goals Statement Agreed to POC.  No additional goal stated.   Swallowing Evaluation Bedside swallow evaluation   Behaviorial Observations Alert   Mode of current nutrition NPO;NG   Respiratory Status O2 Supply   Type of O2 supply (HFNC 30-35L)   Comments Per NP note: Priya Funez is a 78 year old female with PMH of HTN admitted on 1/4/2019 with sudden onset of back pain found to have type A aortic dissection and sent for emergency aortic dissection repair with graft.  She was admitted to ICU for ongoing care.  Extubated per protocol and reintubated on POD #8 for resp distress.    Extubated per protocol and reintubated on POD #8 for resp distress. CT chest on 1/14 demonstrates RLL compressive atelectasis 2/2 elevated hemidiaphrag (present preoperatively). CXR/CT chest with fluid in fissures. Diuresing with assistance of nephrology.POD #7 left US guided thoracentesis with removal of 150 mL fluid. Extubated on POD #14 and currently tolerating Hi Flow 35LPM/45% and bipap with rest.      Swabbing provided, delayed swallow with decreased elevation, cough noted prior to and after swallow.  Patient coughed up thick mucus.  Suctioning provided mutliple times.     Clinical Swallow Evaluation   Oral Musculature generally intact  (increased RR to 32 with movements)   Dentition present and adequate   Secretion Management problems swallowing secretions  (coughed up thick secretions from pharynx)   Laryngeal Function Cough;Throat clear;Voicing initiated;Swallow;Dry swallow palpated  (Mild hoarse voice, decreased elevation)   Esophageal Phase of Swallow   Patient reports or presents with symptoms of esophageal dysphagia No   Swallow Eval: Clinical Impressions    Skilled Criteria for Therapy Intervention Skilled criteria met.  Treatment indicated.   Functional Assessment Scale (FAS) 1   Treatment Diagnosis severe aspiration risk/dysphagia   Diet texture recommendations NPO   Therapy Frequency 5 times/wk   Predicted Duration of Therapy Intervention (days/wks) 1 week   Anticipated Discharge Disposition inpatient rehabilitation facility   Risks and Benefits of Treatment have been explained. Yes   Patient, family and/or staff in agreement with Plan of Care Yes   Clinical Impression Comments A bedside swallow evaluation was completed this pm.  Patient presents with severe aspiration risk/dysphagia at present.  Deficits/risk factors include high O2 needs/HFNC, increased RR with minimal movements, pharyngeal secretions, and cough after swabbing.  Recommend NPO status, frequent oral cares, and encourage patient to cough up and suction secretions.  Plan to initiate 5x/week swallow Tx on 1/23 if improved respiratory status with less HFNC/O2 needs and improved RR.  Patient/family agreement with plan.    Total Evaluation Time   Total Evaluation Time (Minutes) 15

## 2019-01-21 NOTE — PROGRESS NOTES
Vascular Surgery Progress Note    Alert, on bipap  Denies pain, says she wants to try to get up today.    B/P: 99/62, T: 98.6, P: 70, R: 33  Alert oriented no acute distress  abd soft, nontender no guarding or rigidity  Moves all extremities  Mild edema    WBC   Date Value Ref Range Status   01/21/2019 14.8 (H) 4.0 - 11.0 10e9/L Final   ]  Hemoglobin   Date Value Ref Range Status   01/21/2019 8.0 (L) 11.7 - 15.7 g/dL Final   ]  INR   Date Value Ref Range Status   01/21/2019 2.16 (H) 0.86 - 1.14 Final      Creatinine   Date Value Ref Range Status   01/21/2019 1.27 (H) 0.52 - 1.04 mg/dL Final   ]      Intake/Output Summary (Last 24 hours) at 1/21/2019 0956  Last data filed at 1/21/2019 0900  Gross per 24 hour   Intake 2641.33 ml   Output 3580 ml   Net -938.67 ml       Assessment/Plan:  78 year old female withType A dissection s/p repair    Continue TF via cortrack  Serial abdominal exams  WBC improving (14.8 from 16.2)  Wean bipap as able.  Vascular surgery following peripherally, please call if any questions or concerns.    Saige Brown MD  Vascular Surgery Fellow  Pager [phone]

## 2019-01-21 NOTE — PROGRESS NOTES
Patient on BiPAP 14/7 40% overnight. Gel pad/mepilex on. BBS coarse, diminished. RT will continue to follow.

## 2019-01-21 NOTE — PROGRESS NOTES
Renal Medicine Progress Note             Assessment/Plan:         1.  Non-oliguric IMAN/CKD.  - Creatinine plateaued and starting to improve. Excellent UOP.     2.  S/p aortic dissection/repair.  Stable.  A.  Per CV Surg.    3.  SOB/hypoxia/pneumonia.  Pt still requiring supp o2.  Breathing is much better today. Overall volume overloaded. Good UOP  On Lasix + albumin. May autodiurese with resolution of IMAN as well. Continue with same dose lasix +albumin today . Minimize input. May decrease free water to 200 q4h . Target 1-1.5 L negative I/O in 24 hrs.     4.  Anemia. Hgb improving with hemoconcentration  A.  Follow labs.  B.  Transfuse prn.     5.  FEN.   Na is nl.  Free water to 200 q 2h to minimize input  Replate K PRN.     Follow Na.  6. HTN - Bp well controlled.         Interval History:     Awake/alert.   On high flow oxygen. Denies any complaints.   NO nausea./ vomiting. No dyspnea at rest, no chest pain.   BP 120s/ 70s  UOP 3 L .             Medications and Allergies:       albumin human  12.5 g Intravenous Q12H     aspirin  81 mg Per Feeding Tube Daily     furosemide  40 mg Intravenous Q12H     influenza Vac Split High-Dose  0.5 mL Intramuscular Prior to discharge     insulin aspart  1-6 Units Subcutaneous Q4H     ipratropium - albuterol 0.5 mg/2.5 mg/3 mL  3 mL Nebulization 4x daily     lidocaine (PF)  10 mL Subcutaneous Once     melatonin  5 mg Sublingual At Bedtime     metoprolol  2.5 mg Intravenous Q6H     multivitamins w/minerals  15 mL Per Feeding Tube Daily     nystatin  500,000 Units Oral 4x Daily     pantoprazole  40 mg Oral QAM AC     QUEtiapine  50 mg Oral BID     sodium chloride (PF)  10 mL Intracatheter Q8H     warfarin  2.5 mg Oral ONCE at 18:00     Allergies   Allergen Reactions     Amoxicillin Swelling     Ciprofloxacin Swelling          Physical Exam:     Vitals were reviewed  Heart Rate: 70, Blood pressure 99/62, pulse 70, temperature 98.6  F (37  C), temperature source Axillary, resp. rate  "(!) 33, height 1.626 m (5' 4\"), weight 99.2 kg (218 lb 11.1 oz), SpO2 95 %.  Wt Readings from Last 3 Encounters:   01/21/19 99.2 kg (218 lb 11.1 oz)   09/14/08 80.7 kg (178 lb)     Intake/Output Summary (Last 24 hours) at 1/20/2019 0956  Last data filed at 1/20/2019 0856  Gross per 24 hour   Intake 3330 ml   Output 2505 ml   Net 825 ml     GENERAL APPEARANCE: , awake, alert, interactive  HEENT:  Eyes/ears/nose/neck grossly nl x + high flow nc o2  RESP: + few wheezes, rhonchi   CV: RRR c 2/6 m, nl S1/S2, + pacer; sternal incision looks fine  ABDOMEN: o/s/nt/nd  EXTREMITIES/SKIN: 1-2+ edema  NEURO:  eyes open, moves ext, follows all commands properly  LINES:  + coello c yellow urine; + RIJ 3-lumen         Data:     CBC RESULTS:     Recent Labs   Lab 01/21/19  0430 01/20/19  0400 01/19/19  0430 01/18/19  0502 01/17/19  0530 01/16/19  0520   WBC 14.8* 16.2* 13.0* 12.5* 14.9* 12.1*   RBC 2.72* 2.65* 2.57* 2.48* 2.57* 2.59*   HGB 8.0* 7.8* 7.5* 7.2* 7.6* 7.6*   HCT 24.3* 23.7* 22.6* 21.9* 22.5* 22.4*    322 228 193  Canceled, Test credited 187 193     Basic Metabolic Panel:  Recent Labs   Lab 01/21/19  0430 01/20/19  0400 01/19/19  0430 01/18/19  0502 01/17/19  0530 01/16/19  0520    136 138 139 143 147*   POTASSIUM 3.4 3.6 3.9 4.4 3.9 3.7   CHLORIDE 101 100 102 105 107 110*   CO2 30 26 27 24 28 28   BUN 72* 74* 73* 69* 62* 60*   CR 1.27* 1.56* 1.71* 1.73* 1.68* 1.68*   * 120* 96 120* 116* 131*   BESS 8.3* 8.5 8.3* 8.1* 8.0* 8.3*     INR  Recent Labs   Lab 01/21/19  0430 01/20/19  0400 01/19/19  0430 01/18/19  0502   INR 2.16* 1.71* 1.76* 1.79*      Attestation:   I have reviewed today's relevant vital signs, notes, medications, labs and imaging.    Vikash Monroe MD  [facility] - Nephrology   [phone]    "

## 2019-01-21 NOTE — PROGRESS NOTES
[facility]  Critical Care Service  Progress Note  Date of Service (when I saw the patient): 01/21/2019     Main Plans for Today    Aggressive pulmonary hygiene  OOB    SLP consult        Assessment & Plan    Priya Funez is a 78 year old female with PMH of HTN admitted on 1/4/2019 with sudden onset of back pain found to have type A aortic dissection and sent for emergency aortic dissection repair with graft.  She was admitted to ICU for ongoing care.     Interim  events:   1/5: did well overnight.  Epi weaned off; requiring low dose phenylephrine.  Hgb stable.  Extubated in the afternoon.   1/6:poor inspiratory effort and weak cough through the day.     Placed on Bipap overnight.NTG gtt for improved BP control.  Rigors and fever  103  1/7: Fever resolved;    stable respiratory status on HFNC.SvO2 48; getting echocardiogram. EF 55-60%   1/8: Rapid A Fib  1/9: Titrating down FiO2; improved ABG and VBG   1/11-12, hypoxic requiring bipap with little reserve, low UO   fluid resuscitated  with albumin blood and lasix  1/13: re-intubated late on 1/12; significant metabolic acidosis, increased work of breathing   1/18/19: extubated to BIPAP     Neuro  1. ICU delirium  2. Acute pain  Plan:  --PRN acetaminophen, oxycodone, and IV hydromorphone .   --on scheduled seroquel. PRN available   --Maintain circadian rhythm.  Lights on during the day.  Off at night, minimize cares at night.  OOB during the day.   --Up to chair daily     CV  1. S/p Type A aortic dissection repair 1/4 with Davis Little, and Michaela  2. HTN  3. Afib with RVR, uncontrolled with medications now s/p AV node ablation and pacemaker placement 1/11/19  4. Shock; unclear source hypovolemic vs septic; Now resolved    Plan:  --CV Surgery and Vascular surgery primary   --Monitor hemodynamics.  Maintain SBP<130; MAP >65  --Cardiac meds per CV surgery  --Echocardiogram 1/7; EF 50-55%; IVC not dilated, ventricles small   --Warfarin for  Afib       Resp:  1. Acute hypoxemic /hypercapnic respiratory failure; re-intubated 1/12. Extubated 1/18    Plan:   -- HFNC, wean as able. BiPAP at HS for further optimization.    --scheduled duoneb; albuterol nebs PRN  -- CT CAP 1/14 with moderate amount of fluid in fissures, interstitial edema; paralyzed right hemidiaphragm   -- Discussed with family regarding tracheostomy, family agreeable to tracheostomy. Will consult thoracic for trach placement if pt fails this recent extubation          GI/Nutrition  1. Malnutrition, nia/pro deficits  2. diarrhea; presumed colitis. Resolved   Plan:  --GI consulted. Flex sig performed 1/15; diverticulum look fine, mucosa inflamed/congested; favoring colitis. No ischemia noted.  Biopsy showing-- Nonneoplastic colonic mucosa with microscopic lymphoid aggregate, no evidence of microscopic colitis, inflammatory bowel disease or malignancy.  GI recommends Budesenide, 9mg daily. Unable to give via feeding tube. Completed  5 day course oral prednisone    -- NJ placed for feeds. Pt pulled this AM. Abd XR to check placement of NGT  -- TF to goal       Renal  1. IMAN; abdominal aortic dissection involving right renal artery.  Atrophic right kidney with decreased flow. No prior hx.  Baseline creatinine appears to be 0.8;   2. Metabolic alkalosis; resolved   3. Hypophosphatemia; resolved    4. Metabolic acidosis; resolved   5. Hypernatremia; resolved   Plan:  --Nephrology consulted. Renogram 1/7.  Renal US 1/6 with flow present in both R/L renal arteries. Right kidney atrophic.    --Lasix drip stopped this 1/14; patient requiring vasopressors due to hypotension following >24 hours of diuretic drip; worsening renal function; metabolic alkalosis.    --Continue enteric free water at 100mL/hr .  --TKO fluids   --Appreciate fluid and diuresis management from nephrology   --ICU electrolyte replacement protocol      ID  1. Fevers (improved)  2. Leukocytosis (imporoved)  Plan:  --Blood cultures  with no growth.  Urinalysis without concern for UTI; did not reflex to culture. Sputum culture with only candida  --Brittany-op Cefepime and Vancomycin; completed    --Cefepime completed 6 days.   --Vanco and millie 1/12--1/17    --Currently off antibiotics       Endocrine  1. Stress Hyperglycemia; resolved   Plan:  --BG check Q 4 hours. Medium resistance sliding scale insulin. Has not been requiring       Heme:  1. Acute blood loss anemia  2. Thrombocytopenia; resolved   3. Superficial thrombophlebitis in the right basilic vein   Plan:  --Monitor hemoglobin. Transfuse to keep > 7.0  --Warfarin for A Fib; INR therapeutic. Subcutaneous heparin stopped.   --US lower extremities to assess for DVT; no evidence of DVT       MSK/Skin  1. Sternotomy   Plan:  -- Sternal precautions  -- Wound care per protocol      General cares:  DVT Prophylaxis: continue PCD's; Warfarin    GI Prophylaxis: PPI  Restraints: Restraints for medical healing needed: NO  Family update by me today: Yes; pt's son at bedside  Current lines are required for patient management  Access: JEANNE Faustin CNP  Time Spent on this Encounter   Billing:  I spent 30 minutes bedside and on the inpatient unit today managing the critical care of Priya Funez in relation to the issues listed in this note.    Interval History    No acute events overnight. Intermittently confused. Pulled NJ this AM. Denies pain, dyspnea, chest pain, palpitations, dizziness, vomiting.     Physical Exam   Temp: 98.6  F (37  C) Temp src: Axillary Temp  Min: 98.1  F (36.7  C)  Max: 98.6  F (37  C) BP: 99/62 Pulse: 70 Heart Rate: 70 Resp: (!) 33 SpO2: 95 % O2 Device: High Flow Nasal Cannula (HFNC) Oxygen Delivery: Other (Comments)(35 lpm)  Vitals:    01/19/19 0000 01/20/19 0600 01/21/19 0600   Weight: 98 kg (216 lb 0.8 oz) 100.3 kg (221 lb 1.9 oz) 99.2 kg (218 lb 11.1 oz)     I/O last 3 completed shifts:  In: 2851.33 [I.V.:661.33; NG/GT:1300]  Out: 3430 [Urine:3430]    GEN: awake  in bed on BIPAP with full face mask   EYES: PERRL,  Anicteric sclera.   HEENT:  Normocephalic, atraumatic, trachea midline  CV: RRR,   PULM/CHEST: Clear anterior breath sounds bilaterally, mild exp wheezing in RUL   GI: soft + bs   : coello catheter in place, urine yellow and clear  EXTREMITIES: ++ peripheral edema on right, 1+ on left, moving all extremities, peripheral pulses intact  NEURO: carroll follows commands   SKIN: warm and dry  Imaging personally reviewed:  CXR   EC% V-paced     Data   Recent Labs   Lab 19  0430 19  0400 19  0430   WBC 14.8* 16.2* 13.0*   HGB 8.0* 7.8* 7.5*   MCV 89 89 88    322 228   INR 2.16* 1.71* 1.76*    136 138   POTASSIUM 3.4 3.6 3.9   CHLORIDE 101 100 102   CO2 30 26 27   BUN 72* 74* 73*   CR 1.27* 1.56* 1.71*   ANIONGAP 7 10 9   BESS 8.3* 8.5 8.3*   * 120* 96   ALBUMIN 3.6  --   --    PROTTOTAL 5.8*  --   --    BILITOTAL 1.0  --   --    ALKPHOS 73  --   --    ALT 22  --   --    AST 15  --   --

## 2019-01-21 NOTE — PROGRESS NOTES
Today, pt was on HFNC 35 lpm and 45% tolerated well all day and then resumed with BiPAP 14/7 and 40% tolerating well. Neb tx were given as ordered, BBS exp wheezy, SpO2 94%. RT will continue to follow the pt.      RT [place].

## 2019-01-21 NOTE — PLAN OF CARE
Discharge Planner SLP   Patient plan for discharge: Did not state  Current status:  A bedside swallow evaluation was completed this pm.  Patient presents with severe aspiration risk/dysphagia at present.  Deficits/risk factors include high O2 needs/HFNC, increased RR with minimal movements, pharyngeal secretions, and cough after swabbing.  Recommend NPO status, frequent oral cares, and encourage patient to cough up and suction secretions.  Plan to initiate 5x/week swallow Tx on 1/23 if improved respiratory status with less HFNC/O2 needs and improved RR.  Patient/family agreement with plan.   Barriers to return to prior living situation: Level of assist, weakness  Recommendations for discharge: ARU with SLP  Rationale for recommendations: SLP swallow Tx to maximize function for a least restrictive diet       Entered by: Gabby Gooden 01/21/2019 3:06 PM

## 2019-01-21 NOTE — PROGRESS NOTES
CLINICAL NUTRITION SERVICES - REASSESSMENT NOTE      Recommendations Ordered by Registered Dietitian (RD):   Increase TF Peptamen 1.5 to 50 mL/hr now:  1800 kcals (29 kcal/kg), 82 gm pro (1.3 gm/kg), 924 mL H20, 226 gm CHO, no fiber    Cancel Certavite as will no longer be needed   Malnutrition:  (1/12)  % Weight Loss:  None noted  % Intake:  </= 50% for >/= 5 days (severe malnutrition)  Subcutaneous Fat Loss:  None observed  Muscle Loss:  Temporal region mild depletion and Clavicle bone region mild depletion  Fluid Retention:  Mild - Could be partly nutritional due to prolonged poor nutrition     Malnutrition Diagnosis: Non-Severe malnutrition  In Context of:  Acute illness or injury       EVALUATION OF PROGRESS TOWARD GOALS   Diet:  NPO     Nutrition Support:  TF continued at 35 mL/hr all week-end as below:    Nutrition Support Enteral:  Type of Feeding Tube: Nasoduodenal  Enteral Frequency:  Continuous  Enteral Regimen: Peptamen 1.5 to 35 mL/hr  Total Enteral Provisions: 1260 kcals (20 kcal/kg), 57 gm pro (0.9 gm/kg), 647 mL H20, 158 gm CHO, no fiber  Free Water Flush: H20 flushes 100 mL every 2 hrs    Intake/Tolerance:    Stool Pattern:  1/18:  x1  1/19:  x2  1/20:  x3  BUN 72 (H)  Cr 1.27 (H) - Pt with atrophic right kidney  BGM:  111-133 range - acceptable.  Wt:  99.2 kg (up 15.2 kg since admit).  Pt with 2+ mild generalized edema. On Lasix and IV albumin.    ASSESSED NUTRITION NEEDS:  Dosing Weight:  62 kg (adjusted for overweight)  Estimated Energy Needs:  8716-9017 kcals (25-30 Kcal/Kg)  Justification: overweight  Estimated Protein Needs:  74-87 grams protein (1.2-1.4 g pro/Kg)  Justification: Repletion, post-op and atrophic right kidney     NEW FINDINGS:   1/18:  Extubated 1545 (and OG removed)  1/18:  AXR = Tip in 4th portion duodenum (ND)  1/18:  AXR = Tip just beyond LOT ([place])  1/19:  AXR = Tip in distal duodenum, near LOT (ND)  1/19:  TF resumed at 35 mL/hr    Pt continues to receive Certavite via FT -  to meet vitamin/mineral needs while on low volume TF.    On bipap overnight per rounds.    Previous Goals (1/18):   TF increase to Peptamen 1.5 @ 50 mL/hr to meet estimated needs.   Evaluation: Not met    Previous Nutrition Diagnosis (1/18):   Inadequate enteral nutrition related to low TF rate as evidenced by TF regimen only meeting 70-80% estimated needs.  Evaluation: No change      CURRENT NUTRITION DIAGNOSIS  Inadequate enteral nutrition infusion related to low TF rate as evidenced by TF meeting only 70-80% final needs    INTERVENTIONS  Recommendations / Nutrition Prescription  Increase TF Peptamen 1.5 to 50 mL/hr now:  1800 kcals (29 kcal/kg), 82 gm pro (1.3 gm/kg), 924 mL H20, 226 gm CHO, no fiber    Cancel Certavite as will no longer be needed    Implementation  Collaboration and Referral of Nutrition care - Discussed with Jd Faustin, NP, who approved the TF rate increase.  Pt was discussed during ICU interdisciplinary rounds this morning.  EN Schedule - Entered order in Epic for above TF increase  Multivitamin/Mineral - Canceled Certavite in EPIC    Goals  TF goal Peptamen 1.5 at 50 mL/hr will meet % estimated needs      MONITORING AND EVALUATION:  Progress towards goals will be monitored and evaluated per protocol and Practice Guidelines    Nazanin Todd, RD, LD, CNSC

## 2019-01-21 NOTE — PLAN OF CARE
Discharge Planner PT   Patient plan for discharge: None stated  Current status: Pt requires max assist x 2 for bed mobility. Pt tolerates sitting at EOB x 8 minutes with mod to max assist. Pt requires consistent verbal cues throughout session regarding sternal precautions. Pt tolerates LE strengthening exercises without complaint. Overhead lift utilized to transfer patient EOB to chair. VSS throughout session, see flowsheet.  Barriers to return to prior living situation: Level of assist, fall risk, decreased activity tolerance  Recommendations for discharge: ARU  Rationale for recommendations: Pt with good family support, highly motivated to participate in therapy, and has potential to increase independence. Pt will benefit from daily intense physical therapy to address impairments and increase functional independence.        Entered by: Cristel Bruner 01/21/2019 12:21 PM

## 2019-01-21 NOTE — PROGRESS NOTES
[facility]  Cardiovascular and Thoracic Surgery Daily Note          Assessment and Plan:   POD#17 s/p Left groin cutdown with exposure of the femoral vessels with arterial cannulation for cardiopulmonary bypass. Right groin cutdown with exposure of the femoral vessels with the right femoral artery ultimately used for diagnostic angiography. 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 Visceral angiography. Repair of the bilateral femoral vessels. By Dr. Jose Winslow.    Main Plans for today:  1. Continue HFNC and Bipap with rest  2. Work with therapies  3. Diuresis at direction of nephrology  4. Discontinue right internal jugular and obtain peripheral access  5. Speech conult    CVS:   -150/50-80s, HR 80/ 100% VVI paced.  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 On oral amiodarone and coumadin, INR 2.16 today  Pulm:  Extubated per protocol and reintubated on POD #8 for resp distress.  CT chest on 1/14 demonstrates RLL compressive atelectasis 2/2 elevated hemidiaphragm (present preoperatively). CXR/CT chest with fluid in fissures. Diuresing with assistance of nephrology.  POD #7 left US guided thoracentesis with removal of 150 mL fluid.   Extubated on POD #14 and currently tolerating Hi Flow 35LPM/45% and bipap with rest.  Neuro:  Grossly intact, weakly follows commands. Confused.  Pain well managed with current regimen.  Renal:  IMAN -BL Creat ~0.8, 1.27 today; Preop weight 77.1 kg, today 99.2 kg, down ~1 kg overnight.   Renal US on 1/6 with patent flow in bilateral renal arteries. Right kidney atrophic.  Renal re-consulted. Appreciate. Diuresis at [facility] nephrology - appreciate.  BMP in AM  GI:  Malnourished. Receiving tube feedings via NJ feeding tube.  On oral prednisone x 5 days (last day today) for microscopic colitis, flex  sig biopsy negative.  CT abdomen on  did not demonstrate acute pathology.   and  negative for c. Diff  On PPI  :  Barrientos present - strict I/O, limited mobility  Endo:   Preop A1C 5.3; managed on sliding scale insulin. Anticipate hyperglycemia 2/2 steroid. Goal BG <180  FEN:   Replace lytes as needed; Continue tube feedings via postpyloric feeding tube  Speech consult  ID:   Temp (24hrs), Av.3  F (36.8  C), Min:98.1  F (36.7  C), Max:98.6  F (37  C)A  Stress and steroid induced leukocytosis. WBC 12.5.   No further antibiotics.  Sputum + candida,on nystatin; BC NTD.  Heme:   Acute blood loss anemia and thrombocytopenia: Hgb 8; Plt 346  CBC in AM  Tubes/Drains:  Right internal jugular TLC  Feeding tube  Barrientos  Proph:   BB, statin, ASA, coumadin, PCD, PPI  Dispo:   Continue in ICU with close monitoring  Appreciate consulting services          Interval History:   Lying in bed, on HFNC. Follows commands, confused. No acute events overnight         Medications:       albumin human  12.5 g Intravenous Q12H     aspirin  81 mg Per Feeding Tube Daily     furosemide  40 mg Intravenous Q12H     influenza Vac Split High-Dose  0.5 mL Intramuscular Prior to discharge     insulin aspart  1-6 Units Subcutaneous Q4H     ipratropium - albuterol 0.5 mg/2.5 mg/3 mL  3 mL Nebulization 4x daily     lidocaine (PF)  10 mL Subcutaneous Once     metoprolol  2.5 mg Intravenous Q6H     multivitamins w/minerals  15 mL Per Feeding Tube Daily     nystatin  500,000 Units Oral 4x Daily     pantoprazole  40 mg Oral QAM AC     QUEtiapine  50 mg Oral BID     sodium chloride (PF)  10 mL Intracatheter Q8H     sodium phosphate  1 enema Rectal Once     acetaminophen, albuterol, IV fluid REPLACEMENT ONLY, IV fluid REPLACEMENT ONLY, glucose **OR** dextrose **OR** glucagon, heparin lock flush, hydrALAZINE, HYDROmorphone, lidocaine 4%, lidocaine 4%, lidocaine (buffered or not buffered), lidocaine (buffered or not buffered), magnesium sulfate,  "magnesium sulfate, methocarbamol, naloxone, ondansetron **OR** ondansetron, oxyCODONE IR, potassium chloride, potassium chloride with lidocaine, potassium chloride, potassium chloride, potassium chloride, QUEtiapine, sodium chloride (PF), sodium chloride (PF), sodium chloride (PF), [COMPLETED] sodium phosphate **FOLLOWED BY** sodium phosphate, sodium phosphate, sodium phosphate, sodium phosphate, sodium phosphate, Warfarin Therapy Reminder          Physical Exam:   Vitals were reviewed  Blood pressure 99/62, pulse 70, temperature 98.6  F (37  C), temperature source Axillary, resp. rate (!) 33, height 1.626 m (5' 4\"), weight 99.2 kg (218 lb 11.1 oz), SpO2 95 %.  Rhythm: VVI paced at 80 on bedside monitor    Lungs: Wheezy throughout - on HFNC    Cardiovascular: S1, S2, RRR, no m/r/g.     Abdomen: Distended/NT. + BS. NJ in place    Extremeties: 2-3+ edema with 1+ palp pedal pulses    Incision(s): Sternal, left chest, and right femoral incisions CDI    CT: N/A    Weight:   Vitals:    01/17/19 0400 01/18/19 0500 01/19/19 0000 01/20/19 0600   Weight: 93.3 kg (205 lb 11 oz) 98.7 kg (217 lb 9.5 oz) 98 kg (216 lb 0.8 oz) 100.3 kg (221 lb 1.9 oz)    01/21/19 0600   Weight: 99.2 kg (218 lb 11.1 oz)            Data:    All labs and imaging reviewed by me.  Labs:   Lab Results   Component Value Date    WBC 12.5 01/18/2019     Lab Results   Component Value Date    RBC 2.48 01/18/2019     Lab Results   Component Value Date    HGB 7.2 01/18/2019     Lab Results   Component Value Date    HCT 21.9 01/18/2019     No components found for: MCT  Lab Results   Component Value Date    MCV 88 01/18/2019     Lab Results   Component Value Date    MCH 29.0 01/18/2019     Lab Results   Component Value Date    MCHC 32.9 01/18/2019     Lab Results   Component Value Date    RDW 18.1 01/18/2019     Lab Results   Component Value Date    PLT Canceled, Test credited 01/18/2019     01/18/2019       Last Basic Metabolic Panel:  Lab Results "   Component Value Date     01/18/2019      Lab Results   Component Value Date    POTASSIUM 4.4 01/18/2019     Lab Results   Component Value Date    CHLORIDE 105 01/18/2019     Lab Results   Component Value Date    BESS 8.1 01/18/2019     Lab Results   Component Value Date    CO2 24 01/18/2019     Lab Results   Component Value Date    BUN 69 01/18/2019     Lab Results   Component Value Date    CR 1.73 01/18/2019     Lab Results   Component Value Date     01/18/2019         GRZEGORZ García, CCRN-CSC  CV Surgery  [facility] Pager: [phone]  Personal Pager: [phone]

## 2019-01-22 ENCOUNTER — APPOINTMENT (OUTPATIENT)
Dept: PHYSICAL THERAPY | Facility: CLINIC | Age: 79
DRG: 003 | End: 2019-01-22
Payer: COMMERCIAL

## 2019-01-22 LAB
ANION GAP SERPL CALCULATED.3IONS-SCNC: 8 MMOL/L (ref 3–14)
BUN SERPL-MCNC: 71 MG/DL (ref 7–30)
CALCIUM SERPL-MCNC: 8.4 MG/DL (ref 8.5–10.1)
CHLORIDE SERPL-SCNC: 103 MMOL/L (ref 94–109)
CO2 SERPL-SCNC: 30 MMOL/L (ref 20–32)
CREAT SERPL-MCNC: 1.28 MG/DL (ref 0.52–1.04)
ERYTHROCYTE [DISTWIDTH] IN BLOOD BY AUTOMATED COUNT: 17.9 % (ref 10–15)
GFR SERPL CREATININE-BSD FRML MDRD: 40 ML/MIN/{1.73_M2}
GLUCOSE BLDC GLUCOMTR-MCNC: 136 MG/DL (ref 70–99)
GLUCOSE BLDC GLUCOMTR-MCNC: 140 MG/DL (ref 70–99)
GLUCOSE BLDC GLUCOMTR-MCNC: 153 MG/DL (ref 70–99)
GLUCOSE BLDC GLUCOMTR-MCNC: 161 MG/DL (ref 70–99)
GLUCOSE BLDC GLUCOMTR-MCNC: 167 MG/DL (ref 70–99)
GLUCOSE SERPL-MCNC: 133 MG/DL (ref 70–99)
HCT VFR BLD AUTO: 23.1 % (ref 35–47)
HGB BLD-MCNC: 7.6 G/DL (ref 11.7–15.7)
INR PPP: 2.43 (ref 0.86–1.14)
MCH RBC QN AUTO: 29.7 PG (ref 26.5–33)
MCHC RBC AUTO-ENTMCNC: 32.9 G/DL (ref 31.5–36.5)
MCV RBC AUTO: 90 FL (ref 78–100)
PLATELET # BLD AUTO: 288 10E9/L (ref 150–450)
POTASSIUM SERPL-SCNC: 3.4 MMOL/L (ref 3.4–5.3)
RBC # BLD AUTO: 2.56 10E12/L (ref 3.8–5.2)
SODIUM SERPL-SCNC: 141 MMOL/L (ref 133–144)
WBC # BLD AUTO: 12 10E9/L (ref 4–11)

## 2019-01-22 PROCEDURE — 25000132 ZZH RX MED GY IP 250 OP 250 PS 637: Performed by: STUDENT IN AN ORGANIZED HEALTH CARE EDUCATION/TRAINING PROGRAM

## 2019-01-22 PROCEDURE — 94640 AIRWAY INHALATION TREATMENT: CPT

## 2019-01-22 PROCEDURE — 25000125 ZZHC RX 250: Performed by: NURSE PRACTITIONER

## 2019-01-22 PROCEDURE — 27210437 ZZH NUTRITION PRODUCT SEMIELEM INTERMED LITER

## 2019-01-22 PROCEDURE — 85610 PROTHROMBIN TIME: CPT

## 2019-01-22 PROCEDURE — 93005 ELECTROCARDIOGRAM TRACING: CPT

## 2019-01-22 PROCEDURE — 20000003 ZZH R&B ICU

## 2019-01-22 PROCEDURE — 85027 COMPLETE CBC AUTOMATED: CPT

## 2019-01-22 PROCEDURE — 99222 1ST HOSP IP/OBS MODERATE 55: CPT | Performed by: INTERNAL MEDICINE

## 2019-01-22 PROCEDURE — 25000128 H RX IP 250 OP 636: Performed by: INTERNAL MEDICINE

## 2019-01-22 PROCEDURE — 25000132 ZZH RX MED GY IP 250 OP 250 PS 637: Performed by: NURSE PRACTITIONER

## 2019-01-22 PROCEDURE — 97530 THERAPEUTIC ACTIVITIES: CPT | Mod: GP

## 2019-01-22 PROCEDURE — 94799 UNLISTED PULMONARY SVC/PX: CPT

## 2019-01-22 PROCEDURE — 99233 SBSQ HOSP IP/OBS HIGH 50: CPT | Performed by: NURSE PRACTITIONER

## 2019-01-22 PROCEDURE — 40000193 ZZH STATISTIC PT WARD VISIT

## 2019-01-22 PROCEDURE — 40000275 ZZH STATISTIC RCP TIME EA 10 MIN

## 2019-01-22 PROCEDURE — 97110 THERAPEUTIC EXERCISES: CPT | Mod: GP

## 2019-01-22 PROCEDURE — 94660 CPAP INITIATION&MGMT: CPT

## 2019-01-22 PROCEDURE — 94640 AIRWAY INHALATION TREATMENT: CPT | Mod: 76

## 2019-01-22 PROCEDURE — 99207 ZZC CONSULT E&M CHANGED TO INITIAL LEVEL: CPT | Performed by: INTERNAL MEDICINE

## 2019-01-22 PROCEDURE — 25000132 ZZH RX MED GY IP 250 OP 250 PS 637: Performed by: INTERNAL MEDICINE

## 2019-01-22 PROCEDURE — 80048 BASIC METABOLIC PNL TOTAL CA: CPT

## 2019-01-22 PROCEDURE — P9047 ALBUMIN (HUMAN), 25%, 50ML: HCPCS | Performed by: INTERNAL MEDICINE

## 2019-01-22 PROCEDURE — 93010 ELECTROCARDIOGRAM REPORT: CPT | Performed by: INTERNAL MEDICINE

## 2019-01-22 PROCEDURE — 25000132 ZZH RX MED GY IP 250 OP 250 PS 637: Performed by: PHYSICIAN ASSISTANT

## 2019-01-22 PROCEDURE — 00000146 ZZHCL STATISTIC GLUCOSE BY METER IP

## 2019-01-22 PROCEDURE — 36415 COLL VENOUS BLD VENIPUNCTURE: CPT

## 2019-01-22 PROCEDURE — 25000132 ZZH RX MED GY IP 250 OP 250 PS 637: Performed by: THORACIC SURGERY (CARDIOTHORACIC VASCULAR SURGERY)

## 2019-01-22 RX ORDER — METOPROLOL TARTRATE 25 MG/1
25 TABLET, FILM COATED ORAL 2 TIMES DAILY
Status: DISCONTINUED | OUTPATIENT
Start: 2019-01-22 | End: 2019-01-29 | Stop reason: HOSPADM

## 2019-01-22 RX ORDER — QUETIAPINE FUMARATE 25 MG/1
25 TABLET, FILM COATED ORAL 2 TIMES DAILY
Status: DISCONTINUED | OUTPATIENT
Start: 2019-01-22 | End: 2019-01-23

## 2019-01-22 RX ORDER — WARFARIN SODIUM 4 MG/1
4 TABLET ORAL
Status: COMPLETED | OUTPATIENT
Start: 2019-01-22 | End: 2019-01-22

## 2019-01-22 RX ADMIN — METHOCARBAMOL 500 MG: 500 TABLET ORAL at 13:05

## 2019-01-22 RX ADMIN — INSULIN ASPART 1 UNITS: 100 INJECTION, SOLUTION INTRAVENOUS; SUBCUTANEOUS at 15:55

## 2019-01-22 RX ADMIN — INSULIN ASPART 1 UNITS: 100 INJECTION, SOLUTION INTRAVENOUS; SUBCUTANEOUS at 13:14

## 2019-01-22 RX ADMIN — QUETIAPINE 25 MG: 25 TABLET ORAL at 15:49

## 2019-01-22 RX ADMIN — POTASSIUM CHLORIDE 20 MEQ: 1.5 POWDER, FOR SOLUTION ORAL at 06:03

## 2019-01-22 RX ADMIN — WARFARIN SODIUM 4 MG: 4 TABLET ORAL at 18:15

## 2019-01-22 RX ADMIN — ALBUMIN HUMAN 12.5 G: 0.25 SOLUTION INTRAVENOUS at 21:06

## 2019-01-22 RX ADMIN — IPRATROPIUM BROMIDE AND ALBUTEROL SULFATE 3 ML: .5; 2.5 SOLUTION RESPIRATORY (INHALATION) at 19:17

## 2019-01-22 RX ADMIN — IPRATROPIUM BROMIDE AND ALBUTEROL SULFATE 3 ML: .5; 2.5 SOLUTION RESPIRATORY (INHALATION) at 11:33

## 2019-01-22 RX ADMIN — FUROSEMIDE 40 MG: 10 INJECTION, SOLUTION INTRAVENOUS at 09:42

## 2019-01-22 RX ADMIN — IPRATROPIUM BROMIDE AND ALBUTEROL SULFATE 3 ML: .5; 2.5 SOLUTION RESPIRATORY (INHALATION) at 15:41

## 2019-01-22 RX ADMIN — METOPROLOL TARTRATE 2.5 MG: 5 INJECTION, SOLUTION INTRAVENOUS at 04:25

## 2019-01-22 RX ADMIN — Medication 40 MG: at 08:01

## 2019-01-22 RX ADMIN — METHOCARBAMOL 500 MG: 500 TABLET ORAL at 08:01

## 2019-01-22 RX ADMIN — ACETAMINOPHEN 650 MG: 325 TABLET, FILM COATED ORAL at 13:05

## 2019-01-22 RX ADMIN — Medication 5 MG: at 22:23

## 2019-01-22 RX ADMIN — OXYCODONE HYDROCHLORIDE 5 MG: 5 TABLET ORAL at 15:33

## 2019-01-22 RX ADMIN — IPRATROPIUM BROMIDE AND ALBUTEROL SULFATE 3 ML: .5; 2.5 SOLUTION RESPIRATORY (INHALATION) at 07:59

## 2019-01-22 RX ADMIN — METOPROLOL TARTRATE 2.5 MG: 5 INJECTION, SOLUTION INTRAVENOUS at 10:13

## 2019-01-22 RX ADMIN — INSULIN ASPART 1 UNITS: 100 INJECTION, SOLUTION INTRAVENOUS; SUBCUTANEOUS at 20:00

## 2019-01-22 RX ADMIN — ACETAMINOPHEN 650 MG: 325 TABLET, FILM COATED ORAL at 18:15

## 2019-01-22 RX ADMIN — FUROSEMIDE 40 MG: 10 INJECTION, SOLUTION INTRAVENOUS at 22:20

## 2019-01-22 RX ADMIN — ASPIRIN 81 MG 81 MG: 81 TABLET ORAL at 08:01

## 2019-01-22 RX ADMIN — NYSTATIN 500000 UNITS: 500000 SUSPENSION ORAL at 13:05

## 2019-01-22 RX ADMIN — ACETAMINOPHEN 650 MG: 325 TABLET, FILM COATED ORAL at 08:01

## 2019-01-22 RX ADMIN — ALBUMIN HUMAN 12.5 G: 0.25 SOLUTION INTRAVENOUS at 08:43

## 2019-01-22 RX ADMIN — NYSTATIN 500000 UNITS: 500000 SUSPENSION ORAL at 18:15

## 2019-01-22 RX ADMIN — METHOCARBAMOL 500 MG: 500 TABLET ORAL at 18:15

## 2019-01-22 RX ADMIN — METOPROLOL TARTRATE 25 MG: 25 TABLET ORAL at 18:15

## 2019-01-22 RX ADMIN — OXYCODONE HYDROCHLORIDE 5 MG: 5 TABLET ORAL at 09:58

## 2019-01-22 RX ADMIN — QUETIAPINE 50 MG: 25 TABLET ORAL at 08:01

## 2019-01-22 RX ADMIN — NYSTATIN 500000 UNITS: 500000 SUSPENSION ORAL at 05:47

## 2019-01-22 RX ADMIN — QUETIAPINE 25 MG: 25 TABLET ORAL at 21:06

## 2019-01-22 ASSESSMENT — ACTIVITIES OF DAILY LIVING (ADL)
ADLS_ACUITY_SCORE: 16
ADLS_ACUITY_SCORE: 16
ADLS_ACUITY_SCORE: 20
ADLS_ACUITY_SCORE: 16
ADLS_ACUITY_SCORE: 16
ADLS_ACUITY_SCORE: 20

## 2019-01-22 ASSESSMENT — MIFFLIN-ST. JEOR: SCORE: 1453

## 2019-01-22 NOTE — PROGRESS NOTES
[facility]  Cardiovascular and Thoracic Surgery Daily Note          Assessment and Plan:   POD#18 s/p Left groin cutdown with exposure of the femoral vessels with arterial cannulation for cardiopulmonary bypass. Right groin cutdown with exposure of the femoral vessels with the right femoral artery ultimately used for diagnostic angiography. 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 Visceral angiography. Repair of the bilateral femoral vessels. By Dr. Jose Winslow.    Main Plans for today:  1. Continue HFNC and Bipap with rest/ pulm hygiene  2. Work with therapies  3. Diuresis at [facility] of nephrology  4. Consult hospitalist service     CVS:   -140/60s, HR 80/ 100% VVI paced.  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 On oral amiodarone and coumadin, INR 2.43 today. Some PVCs this AM, EKG stable.  Pulm:  Extubated per protocol and reintubated on POD #8 for resp distress.  CT chest on 1/14 demonstrates RLL compressive atelectasis 2/2 elevated hemidiaphragm (present preoperatively). CXR/CT chest with fluid in fissures. Diuresing with assistance of nephrology.  POD #7 left US guided thoracentesis with removal of 150 mL fluid.   Extubated on POD #14 and currently tolerating Hi Flow 35LPM/45% and bipap with rest.  Respirations continue to be in the mid 30s.  Neuro:  Grossly intact, weakly follows commands. Confused. On seroquel.  Pain well managed with current regimen.  Deconditioned - working with PT/OT/speech  Renal:  IMAN -BL Creat ~0.8, 1.28 today; Preop weight 77.1 kg, today 97.8 kg, down 1.4 kg overnight.   Renal US on 1/6 with patent flow in bilateral renal arteries. Right kidney atrophic.  Renal on board. Appreciate. Diuresis at [facility] nephrology - appreciate.  Fluid overloaded with significant edema.  BMP in  AM  GI:  Malnourished. Receiving tube feedings via [place] feeding tube.  Five day course of oral prednisone completed for suspected microscopic colitis, flex sig biopsy negative.  CT abdomen on  did not demonstrate acute pathology.  Speech consulted, strict NPO for now   and  negative for c. Diff  On PPI  :  Barrientos discontinued on ; external incontinence  in place  Endo:   Preop A1C 5.3; managed on sliding scale insulin. Goal BG <180  FEN:   Replace lytes as needed; Continue tube feedings via postpyloric feeding tube  Speech consult - appreciate  ID:   Temp (24hrs), Av.3  F (36.8  C), Min:98.1  F (36.7  C), Max:98.6  F (37  C)A  Stress and steroid induced leukocytosis. WBC 12.   No further antibiotics.  Sputum + candida,on nystatin; BC NTD.  Heme:   Acute blood loss anemia and thrombocytopenia: Hgb 7.6; Plt 288  Tubes/Drains:  Feeding tube  Proph:   BB, statin, ASA, coumadin, PCD, PPI  Dispo:   Continue in ICU with close monitoring  Appreciate consulting services          Interval History:   Sitting in chair, on HFNC. Follows commands, confused. No acute events overnight         Medications:       albumin human  12.5 g Intravenous Q12H     aspirin  81 mg Per Feeding Tube Daily     furosemide  40 mg Intravenous Q12H     influenza Vac Split High-Dose  0.5 mL Intramuscular Prior to discharge     insulin aspart  1-6 Units Subcutaneous Q4H     ipratropium - albuterol 0.5 mg/2.5 mg/3 mL  3 mL Nebulization 4x daily     lidocaine (PF)  10 mL Subcutaneous Once     melatonin  5 mg Sublingual At Bedtime     metoprolol  2.5 mg Intravenous Q6H     nystatin  500,000 Units Oral 4x Daily     pantoprazole  40 mg Oral QAM AC     QUEtiapine  50 mg Oral BID     sodium chloride (PF)  10 mL Intracatheter Q8H     warfarin  4 mg Oral ONCE at 18:00     acetaminophen, albuterol, IV fluid REPLACEMENT ONLY, glucose **OR** dextrose **OR** glucagon, heparin lock flush, hydrALAZINE, HYDROmorphone, lidocaine 4%, lidocaine  "4%, lidocaine (buffered or not buffered), lidocaine (buffered or not buffered), magnesium sulfate, magnesium sulfate, - MEDICATION INSTRUCTIONS -, - MEDICATION INSTRUCTIONS -, methocarbamol, naloxone, ondansetron **OR** ondansetron, oxyCODONE IR, potassium chloride, potassium chloride with lidocaine, potassium chloride, potassium chloride, potassium chloride, QUEtiapine, sodium chloride (PF), sodium chloride (PF), sodium chloride (PF), [COMPLETED] sodium phosphate **FOLLOWED BY** sodium phosphate, sodium phosphate, sodium phosphate, sodium phosphate, sodium phosphate, Warfarin Therapy Reminder          Physical Exam:   Vitals were reviewed  Blood pressure 118/71, pulse 61, temperature 99.6  F (37.6  C), temperature source Axillary, resp. rate (!) 35, height 1.626 m (5' 4\"), weight 97.8 kg (215 lb 9.8 oz), SpO2 (!) 89 %.  Rhythm: VVI paced at 80 on bedside monitor    Lungs: Wheezy throughout - on HFNC    Cardiovascular: S1, S2, RRR, no m/r/g.     Abdomen: Distended/NT. + BS. NJ in place    Extremeties: 2-3+ edema with 1+ palp pedal pulses    Incision(s): Sternal, left chest, and right femoral incisions CDI    CT: N/A    Weight:   Vitals:    01/18/19 0500 01/19/19 0000 01/20/19 0600 01/21/19 0600   Weight: 98.7 kg (217 lb 9.5 oz) 98 kg (216 lb 0.8 oz) 100.3 kg (221 lb 1.9 oz) 99.2 kg (218 lb 11.1 oz)    01/22/19 0600   Weight: 97.8 kg (215 lb 9.8 oz)            Data:    All labs and imaging reviewed by me.  Labs:   Lab Results   Component Value Date    WBC 12.5 01/18/2019     Lab Results   Component Value Date    RBC 2.48 01/18/2019     Lab Results   Component Value Date    HGB 7.2 01/18/2019     Lab Results   Component Value Date    HCT 21.9 01/18/2019     No components found for: MCT  Lab Results   Component Value Date    MCV 88 01/18/2019     Lab Results   Component Value Date    MCH 29.0 01/18/2019     Lab Results   Component Value Date    MCHC 32.9 01/18/2019     Lab Results   Component Value Date    RDW 18.1 " 01/18/2019     Lab Results   Component Value Date    PLT Canceled, Test credited 01/18/2019     01/18/2019       Last Basic Metabolic Panel:  Lab Results   Component Value Date     01/18/2019      Lab Results   Component Value Date    POTASSIUM 4.4 01/18/2019     Lab Results   Component Value Date    CHLORIDE 105 01/18/2019     Lab Results   Component Value Date    BESS 8.1 01/18/2019     Lab Results   Component Value Date    CO2 24 01/18/2019     Lab Results   Component Value Date    BUN 69 01/18/2019     Lab Results   Component Value Date    CR 1.73 01/18/2019     Lab Results   Component Value Date     01/18/2019         GRZEGORZ García, CCRN-CSC  CV Surgery  [facility] Pager: [phone]  Personal Pager: [phone]

## 2019-01-22 NOTE — PLAN OF CARE
Pt alert and oriented to person and place. Forgetful as to what year it is. Becomes more delirious and confused during the middle of the night. 100% paced HR. VS stable. On BIPAP overnight. Coarse lung sounds. Several loose BM. Bladder scanned and straight cathed pt with 900 ml output at 2 am. TF @ goal. Will continue to monitor

## 2019-01-22 NOTE — CONSULTS
[facility]    Hospitalist Consultation    Date of Admission:  1/4/2019  Date of Consult (When I saw the patient): 01/22/19    Assessment & Plan   Priya Funez is a 78 year old female with hx of HTN who was admitted to the CV Surgery/ICU service on 1/4/2019 for an extensive ascending aorta dissection and underwent an emergent aortic dissection repair with graft. Hospital course has been complicated by prolonged respiratory failure with failed extubation trials (ultimately successfully extubated on 1/18), a-fib/RVR requiring ablation and pacemaker placement on 1/11, diarrhea initially concerning for microscopic colitis, IMAN, and severe deconditioning. Hospitalist service was consulted for transfer of care on 1/22/2018.    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 CTA on arrival showed type A aortic dissection with extensive flap extending from the ascending thoracic aorta all the way to the iliac bifurcation. CV Surgery was notified in the ED, and patient underwent emergent dissection repair and bilateral femoral vessel cutdown/repair by Dr. Winslow, Dr. Mabry, and Dr. Jennings. Bypass time 114 min. Weaned off pressors on 1/5.   - Management as per CV Surgery  - PT/OT, cardiac rehab    Acute hypoxic respiratory failure, multifactorial  Intubated for surgery, extubated on 1/5, and then re-intubated on 1/12. Potential tracheostomy was discussed, but patient ultimately passed extubation trial on 1/18, and has remained stable.  Respiratory failure has been attributed to pulmonary edema from aggressive fluid resuscitation perioperatively and decreased reserve from paralyzed right hemidiaphragm. TTE (1/7) showed LVEF 55-60% and indeterminate diastolic function.   - On HFNC 35L, 55% fiO2, biPAP QID - wean as per RT  - Continues on albumin and Lasix 40 mg IV BID as per Nephrology  -  Continues on duonebs QID  - Encourage aggressive pulm toilet: incentive spirometry, acapella valve    ICU delirium, Improved  Ultimately started on Seroquel by Intensivist  - Decrease Seroquel from 50 mg BID to 25 mg BID  - Seroquel 25 mg q6h available prn    Atrial fibrillation with RVR, Resolved  Developed persistent a-fib/RVR following surgery, and failed attempt at medical management. She ultimately underwent AV node ablation with uneventful pacemaker placement by Cardiology on 1/11. She has been since been maintained on metoprolol and warfarin for CHADS2-VASc score 3.   - Change IV metoprolol to PO today, 1/22  - Continues on warfarin per PharmD dosing  - Cardiology has signed off. Follow up in device clinic     Essential hypertension  [PTA: HCTZ 12.5 mg daily]  - Holding PTA HCTZ while on IV Lasix  - She has been started on metoprolol as noted above    IMAN, Improved  Creatinine up to 1.99 from baseline ~0.8, due to ATN from decreased perfusion - dissection involving right renal artery and acute blood loss from surgery. Renogram (1/3) showed decreased renal function bilaterally (estimated 34% of the right kidney, 66% function of the left).   - Creatinine 1.28 today, will follow  - On albumin and Lasix as per Nephrology  - Nephrology following, appreciate input    Acute blood loss anemia due to surgery  Baseline Hgb 11-13. EBL 1.2L. She has received 2 units prbcs since her surgery.   - Hgb has been stable in the mid 7 range    Dysphagia due to prolonged extubation and severe deconditioning  Severe malnutrition due to acute illness  - Remains NPO per SLP  - On tube feeds as per Nutrition  - SLP and Nutrition following, appreciate assistance    Diarrhea, Improving   Developed persistent diarrhea post-op. C.diff PCR negative. GI was consulted - there was some concern for microscopic colitis, but biopsies returned negative. Her diarrhea has since been improving.   - Minimal loose stools overnight    FEN: NPO, tube  feeds  GI prophylaxis: PPI  DVT Prophylaxis: Warfarin  Tubes/lines: NG tube, PIV  Code Status: Full Code    Disposition: Remains in ICU due to tenuous respiratory status as per CV Surgery    Katie Harmon    Reason for Consult   Reason for consult: Transfer of care    Primary Care Physician   Alecia Milaln MD    Chief Complaint   Acute back and jaw pain    History is obtained from the patient, discussion with CV Surgery, and review of medical records    History of Present Illness   Priya Funez is a 78 year old female with hx of HTN who was admitted to the CV Surgery/ICU service on 1/4/2019 for an extensive ascending aorta dissection and underwent an emergent aortic dissection repair with graft. Hospital course has been complicated by prolonged respiratory failure with failed extubation trials (ultimately successfully extubated on 1/18), a-fib/RVR requiring ablation and pacemaker placement on 1/11, diarrhea initially concerning for microscopic colitis, IMAN, and severe deconditioning. Hospitalist service was consulted for transfer of care on 1/22/2018.    Currently, the patient reports feeling tired and deconditioned. She endorses mild shortness of breath and moderate back pain mainly from positioning, but offers no other complaints. She is receiving albumin/IV Lasix for volume overload and IMAN. She no longer has a Barrientos catheter, but reports abundant response to Lasix. She remains NPO per SLP due to severe deconditioning, but is being fed via tube feeds. Diarrhea is improving - she had very small loose stools overnight.     Past Medical History    I have reviewed this patient's medical history and updated the medical record  Past Medical History:   Diagnosis Date     Hypertension        Past Surgical History   I have reviewed this patient's surgical history and updated the medical record  Past Surgical History:   Procedure Laterality Date     ANGIOGRAM Right 1/4/2019    Procedure: DIAGONSTIC ANGIOGRAM  OF [facility];  Surgeon: Rigo Jennings MD;  Location:  OR     BYPASS GRAFT ARTERY CORONARY, REPAIR VALVE AORTIC, COMBINED N/A 1/4/2019    Procedure: AORTIC DISSECTION REPAIR WITH GRAFT- HEMASHIELD PLATINUM WOVEN DOUBLE VELOR 4 SIDE ARM VASCULAR GRAFT, D:35 X 12 X 10 X 10MM/ L:50CM;  Surgeon: Jose Winslow MD;  Location:  OR     EP ABLATION AV NODE N/A 1/11/2019    Procedure: EP Ablation AV Node;  Surgeon: Tsering Davey MD;  Location:  HEART CARDIAC CATH LAB     EP PACEMAKER Left 1/11/2019    Procedure: EP Pacemaker;  Surgeon: Tsering Davey MD;  Location:  HEART CARDIAC CATH LAB       Prior to Admission Medications   Prior to Admission Medications   Prescriptions Last Dose Informant Patient Reported? Taking?   aspirin (ASA) 81 MG EC tablet 1/4/2019  Yes Yes   Sig: Take 81 mg by mouth every 24 hours   cholecalciferol (VITAMIN D-1000 MAX ST) 1000 units TABS 1/4/2019  Yes Yes   Sig: Take 1,000 Units by mouth daily    triamterene-HCTZ (MAXZIDE-25) 37.5-25 MG tablet 1/4/2019  Yes Yes   Sig: Take 0.5 tablets by mouth daily       Facility-Administered Medications: None     Allergies   Allergies   Allergen Reactions     Amoxicillin Swelling     Ciprofloxacin Swelling       Social History   She denies history of smoking. She reports minimal alcohol use. She is retired and lives with her .    Family History   Family history was reviewed and non-contributory    Review of Systems   The 10 point Review of Systems is negative other than noted in the HPI    Physical Exam   Temp: 99.6  F (37.6  C) Temp src: Axillary BP: 111/55 Pulse: 70 Heart Rate: 69 Resp: (!) 32 SpO2: 91 % O2 Device: High Flow Nasal Cannula (HFNC) Oxygen Delivery: Other (Comments)(35 LPM)  Vital Signs with Ranges  Temp:  [98  F (36.7  C)-99.6  F (37.6  C)] 99.6  F (37.6  C)  Pulse:  [61-74] 70  Heart Rate:  [68-74] 69  Resp:  [11-39] 32  BP: ()/(41-79) 111/55  FiO2 (%):  [40 %-60 %] 55 %  SpO2:  [78 %-99 %] 91 %  215 lbs 9.76  oz    Constitutional: NAD  HEENT: NC/AT, sclera white, conjunctiva clear, EOMI, MMM  Respiratory: Mildly tachypnic. Coarse breath sounds bilaterally  Cardiovascular: Heart sounds distant, RRR. 1+ BLE edema  GI: +BS. Abd soft/NT  Lymph/Hematologic: No cervical LAD  Skin: Sternotomy incision c/d/i  Musculoskeletal: Normal muscle bulk and tone  Neurologic: Alert and appropriate. VERDIN  Psychiatric: Calm and cooperative    Data   -Data reviewed today: All pertinent laboratory and imaging results from this encounter were reviewed. I personally reviewed no images or EKG's today.  Recent Labs   Lab 01/22/19  0435 01/21/19  0430 01/20/19  0400   WBC 12.0* 14.8* 16.2*   HGB 7.6* 8.0* 7.8*   MCV 90 89 89    346 322   INR 2.43* 2.16* 1.71*    138 136   POTASSIUM 3.4 3.4 3.6   CHLORIDE 103 101 100   CO2 30 30 26   BUN 71* 72* 74*   CR 1.28* 1.27* 1.56*   ANIONGAP 8 7 10   BESS 8.4* 8.3* 8.5   * 112* 120*   ALBUMIN  --  3.6  --    PROTTOTAL  --  5.8*  --    BILITOTAL  --  1.0  --    ALKPHOS  --  73  --    ALT  --  22  --    AST  --  15  --        Recent Results (from the past 24 hour(s))   XR Abdomen Port 1 View    Narrative    XR ABDOMEN PORT 1 VW  1/21/2019 1:50 PM     HISTORY:  assess NJ placement, pulled back by pt    COMPARISON: Film dated 1/19/2019    FINDINGS:  A feeding tube is seen. The tip is near the ligament of  Treitz. It is near the junction of the duodenum and jejunum. It is in  good position.    RENATA FREIRE MD

## 2019-01-22 NOTE — PROGRESS NOTES
Patient on BiPAP 14/7 60% overnight. Alarm volume set at 10. Gel pad/mepilex on. BBS coarse, diminished. RT will continue to follow.

## 2019-01-22 NOTE — PROGRESS NOTES
Patient was placed on BiPAP 14/7 Oxygen   45 %; Alarm Volume 10. Skin protective barriers were applied.       Patient tolerating well.  RT will continue to monitor.     Azucena Bazan, RRT  1/21/2019 7:40 PM

## 2019-01-22 NOTE — PLAN OF CARE
Discharge Planner PT   Patient plan for discharge: Not stated   Current status: Patient supine in bed upon arrival of therapist, agreeable to working with PT. Supine<>sit with max A x 2, patient able to sit at EOB for ~8 minutes with CGA to max A for sitting balance. Patient with increased R lean at times with VCs and assistance to support. Patient more fatigued towards end of sitting and requiring max A to support. Patient able to complete supine and seated exercises x 10 reps with cues for technique and assistance at times. Patient returned to supine at end of session with all needs in reach and bed alarm on, positioned with pillows for comfort.   Barriers to return to prior living situation: Current level of assist, decreased tolerance to activity, medical complexity   Recommendations for discharge: ARU  Rationale for recommendations: Patient is highly motivated to work with therapies and has good family support; patient would benefit from continued intensive skilled therapy intervention to further address functional limitations and work to improve strength and balance deficits to promote return to baseline level of function and decrease risk of falls.        Entered by: Manuela Torres 01/22/2019 12:27 PM

## 2019-01-22 NOTE — PROGRESS NOTES
Renal Medicine Progress Note             Assessment/Plan:         1.  Non-oliguric IMAN/CKD.  - Creatinine plateaued and stable. Excellent UOP.     2.  S/p aortic dissection/repair.  Stable.  A.  Per CV Surg.    3.  SOB/hypoxia/pneumonia.  Pt still requiring supp o2.   Overall volume overloaded. Good UOP  On Lasix + albumin. May autodiurese with resolution of IMAN as well. Still significantly volume up. Continue with same dose lasix +albumin today. May stop albumin after today . Free water decreased to 200 q4h . Target 1-1.5 L negative I/O in 24 hrs.     4.  Anemia. Hgb slowly downtrending.  A.  Follow labs.  B.  Transfuse prn.     5.  FEN.   Na is nl.  Free water to 200 q 2h to minimize input  Replate K PRN.     Follow Na.  6. HTN - Bp well controlled.         Interval History:     Awake/alert.  Out of bed .   Feels tired.   Excellent UOP. -ve 1.1 L . Weight down 99.2->97.8 kg  NO nausea./ vomiting. No dyspnea at rest, no chest pain.   On tube feed.   BP 110s/ 60s  UOP 3.2 L . S.Cr stable            Medications and Allergies:       albumin human  12.5 g Intravenous Q12H     aspirin  81 mg Per Feeding Tube Daily     furosemide  40 mg Intravenous Q12H     influenza Vac Split High-Dose  0.5 mL Intramuscular Prior to discharge     insulin aspart  1-6 Units Subcutaneous Q4H     ipratropium - albuterol 0.5 mg/2.5 mg/3 mL  3 mL Nebulization 4x daily     lidocaine (PF)  10 mL Subcutaneous Once     melatonin  5 mg Sublingual At Bedtime     metoprolol  2.5 mg Intravenous Q6H     nystatin  500,000 Units Oral 4x Daily     pantoprazole  40 mg Oral QAM AC     QUEtiapine  50 mg Oral BID     sodium chloride (PF)  10 mL Intracatheter Q8H     warfarin  4 mg Oral ONCE at 18:00     Allergies   Allergen Reactions     Amoxicillin Swelling     Ciprofloxacin Swelling          Physical Exam:     Vitals were reviewed  Heart Rate: 70, Blood pressure 118/71, pulse 61, temperature 99.6  F (37.6  C), temperature source Axillary, resp. rate (!)  "35, height 1.626 m (5' 4\"), weight 97.8 kg (215 lb 9.8 oz), SpO2 (!) 89 %.  Wt Readings from Last 3 Encounters:   01/22/19 97.8 kg (215 lb 9.8 oz)   09/14/08 80.7 kg (178 lb)       GENERAL APPEARANCE: , awake, alert, interactive  HEENT:  Eyes/ears/nose/neck grossly nl x + high flow nc o2  RESP: + few wheezes, rhonchi   CV: RRR c 2/6 m, nl S1/S2, + pacer; sternal incision looks fine  ABDOMEN: o/s/nt/nd  EXTREMITIES/SKIN: 1-2+ edema  NEURO:  eyes open, moves ext, follows all commands properly  LINES:  + coello c yellow urine; + RIJ 3-lumen         Data:     CBC RESULTS:     Recent Labs   Lab 01/22/19  0435 01/21/19  0430 01/20/19  0400 01/19/19  0430 01/18/19  0502 01/17/19  0530   WBC 12.0* 14.8* 16.2* 13.0* 12.5* 14.9*   RBC 2.56* 2.72* 2.65* 2.57* 2.48* 2.57*   HGB 7.6* 8.0* 7.8* 7.5* 7.2* 7.6*   HCT 23.1* 24.3* 23.7* 22.6* 21.9* 22.5*    346 322 228 193  Canceled, Test credited 187     Basic Metabolic Panel:  Recent Labs   Lab 01/22/19  0435 01/21/19  0430 01/20/19  0400 01/19/19  0430 01/18/19  0502 01/17/19  0530    138 136 138 139 143   POTASSIUM 3.4 3.4 3.6 3.9 4.4 3.9   CHLORIDE 103 101 100 102 105 107   CO2 30 30 26 27 24 28   BUN 71* 72* 74* 73* 69* 62*   CR 1.28* 1.27* 1.56* 1.71* 1.73* 1.68*   * 112* 120* 96 120* 116*   BESS 8.4* 8.3* 8.5 8.3* 8.1* 8.0*     INR  Recent Labs   Lab 01/22/19  0435 01/21/19  0430 01/20/19  0400 01/19/19  0430   INR 2.43* 2.16* 1.71* 1.76*      Attestation:   I have reviewed today's relevant vital signs, notes, medications, labs and imaging.    Vikash Monroe MD  [facility] - Nephrology   [phone]    "

## 2019-01-22 NOTE — PROGRESS NOTES
[facility]  Critical Care Service  Progress Note  Date of Service (when I saw the patient): 01/22/2019     Main Plans for Today    Mobilization  Aggressive pulmonary hygiene   Transfer service to hospitalist         Assessment & Plan    Priya Funez is a 78 year old female with PMH of HTN admitted on 1/4/2019 with sudden onset of back pain found to have type A aortic dissection and sent for emergency aortic dissection repair with graft.  She was admitted to ICU for ongoing care.     Interim  events:   1/5: did well overnight.  Epi weaned off; requiring low dose phenylephrine.  Hgb stable.  Extubated in the afternoon.   1/6:poor inspiratory effort and weak cough through the day.     Placed on Bipap overnight.NTG gtt for improved BP control.  Rigors and fever  103  1/7: Fever resolved;    stable respiratory status on HFNC.SvO2 48; getting echocardiogram. EF 55-60%   1/8: Rapid A Fib  1/9: Titrating down FiO2; improved ABG and VBG   1/11-12, hypoxic requiring bipap with little reserve, low UO   fluid resuscitated  with albumin blood and lasix  1/13: re-intubated late on 1/12; significant metabolic acidosis, increased work of breathing   1/18/19: extubated to BIPAP     Neuro  1. ICU delirium  2. Acute pain  Plan:  --PRN acetaminophen, oxycodone, and IV hydromorphone .   --on scheduled seroquel. PRN available   --Maintain circadian rhythm.  Lights on during the day.  Off at night, minimize cares at night.  OOB during the day.   --Up to chair daily     CV  1. S/p Type A aortic dissection repair 1/4 with Davis Little, and Michaela  2. HTN  3. Afib with RVR, uncontrolled with medications now s/p AV node ablation and pacemaker placement 1/11/19  4. Shock; unclear source hypovolemic vs septic; Now resolved    Plan:  --CV Surgery and Vascular surgery primary   --Monitor hemodynamics.  Maintain SBP<130; MAP >65  --Cardiac meds per CV surgery  --Echocardiogram 1/7; EF 50-55%; IVC not dilated, ventricles  small   --Warfarin for Afib       Resp:  1. Acute hypoxemic /hypercapnic respiratory failure; re-intubated 1/12. Extubated 1/18    Plan:   -- HFNC, wean as able. BiPAP at HS for further optimization. Now on minimal highflow during the day. Consider weaning off high flow. Would continue BiPAP at HS   --scheduled duoneb; albuterol nebs PRN  -- CT CAP 1/14 with moderate amount of fluid in fissures, interstitial edema; paralyzed right hemidiaphragm           GI/Nutrition  1. Malnutrition, nia/pro deficits  2. diarrhea; presumed colitis. Resolved   Plan:  --GI consulted. Flex sig performed 1/15; diverticulum look fine, mucosa inflamed/congested; favoring colitis. No ischemia noted.  Biopsy showing-- Nonneoplastic colonic mucosa with microscopic lymphoid aggregate, no evidence of microscopic colitis, inflammatory bowel disease or malignancy.  GI recommends Budesenide, 9mg daily. Unable to give via feeding tube. Completed  5 day course oral prednisone    -- NJ placed for feed  -- TF to goal  -- SLP consulted. Currently too deconditioned to swallow effectively. They will continue to follow and assess       Renal  1. IMAN; abdominal aortic dissection involving right renal artery.  Atrophic right kidney with decreased flow. No prior hx.  Baseline creatinine appears to be 0.8;   2. Metabolic alkalosis; resolved   3. Hypophosphatemia; resolved    4. Metabolic acidosis; resolved   5. Hypernatremia; resolved   Plan:  --Nephrology consulted. Renogram 1/7.  Renal US 1/6 with flow present in both R/L renal arteries. Right kidney atrophic.    --Lasix drip stopped this 1/14; patient requiring vasopressors due to hypotension following >24 hours of diuretic drip; worsening renal function; metabolic alkalosis.    --Continue enteric free water at 200 ml q 4 hours  .  --TKO fluids   --Appreciate fluid and diuresis management from nephrology   --ICU electrolyte replacement protocol      ID  1. Fevers (improved)  2. Leukocytosis  (imporoved)  Plan:  --Blood cultures with no growth.  Urinalysis without concern for UTI; did not reflex to culture. Sputum culture with only candida  --Brittany-op Cefepime and Vancomycin; completed    --Cefepime completed 6 days.   --Vanco and millie 1/12--1/17    --Currently off antibiotics       Endocrine  1. Stress Hyperglycemia; resolved   Plan:  --BG check Q 4 hours. Medium resistance sliding scale insulin. Has not been requiring       Heme:  1. Acute blood loss anemia  2. Thrombocytopenia; resolved   3. Superficial thrombophlebitis in the right basilic vein   Plan:  --Monitor hemoglobin. Transfuse to keep > 7.0  --Warfarin for A Fib; INR therapeutic. Subcutaneous heparin stopped.   --US lower extremities to assess for DVT; no evidence of DVT       MSK/Skin  1. Sternotomy   Plan:  -- Sternal precautions  -- Wound care per protocol      General cares:  DVT Prophylaxis: continue PCD's; Warfarin    GI Prophylaxis: PPI  Restraints: Restraints for medical healing needed: NO  Family update by me today: Yes; pt's son at bedside  Current lines are required for patient management  Access: JEANNE Faustin CNP  Time Spent on this Encounter   Billing:  I spent 30 minutes bedside and on the inpatient unit today managing the critical care of Priya Funez in relation to the issues listed in this note.    Interval History    No acute events overnight.  Denies pain, dyspnea, chest pain, palpitations, dizziness, vomiting.     Physical Exam   Temp: 99.6  F (37.6  C) Temp src: Axillary Temp  Min: 98  F (36.7  C)  Max: 99.6  F (37.6  C) BP: 111/55 Pulse: 70 Heart Rate: 69 Resp: (!) 32 SpO2: 91 % O2 Device: High Flow Nasal Cannula (HFNC) Oxygen Delivery: Other (Comments)(35 LPM)  Vitals:    01/20/19 0600 01/21/19 0600 01/22/19 0600   Weight: 100.3 kg (221 lb 1.9 oz) 99.2 kg (218 lb 11.1 oz) 97.8 kg (215 lb 9.8 oz)     I/O last 3 completed shifts:  In: 2180 [I.V.:560; NG/GT:800]  Out: 2680 [Urine:2680]    GEN: awake in bed,  NAD   EYES: PERRL,  Anicteric sclera.   HEENT:  Normocephalic, atraumatic, trachea midline  CV: RRR,   PULM/CHEST: Clear anterior breath sounds bilaterally, mild exp wheezing in RUL. Mild tachypnea   GI: soft + bs   : coello catheter in place, urine yellow and clear  EXTREMITIES: ++ peripheral edema on right, 1+ on left, moving all extremities, peripheral pulses intact  NEURO: carroll follows commands   SKIN: warm and dry  Imaging personally reviewed:  CXR   EC% V-paced     Data   Recent Labs   Lab 19  0435 19  0430 19  0400   WBC 12.0* 14.8* 16.2*   HGB 7.6* 8.0* 7.8*   MCV 90 89 89    346 322   INR 2.43* 2.16* 1.71*    138 136   POTASSIUM 3.4 3.4 3.6   CHLORIDE 103 101 100   CO2 30 30 26   BUN 71* 72* 74*   CR 1.28* 1.27* 1.56*   ANIONGAP 8 7 10   BESS 8.4* 8.3* 8.5   * 112* 120*   ALBUMIN  --  3.6  --    PROTTOTAL  --  5.8*  --    BILITOTAL  --  1.0  --    ALKPHOS  --  73  --    ALT  --  22  --    AST  --  15  --

## 2019-01-22 NOTE — PROGRESS NOTES
Neuro: Alert, disoriented to time. Forgetful. Calm/cooperative  CV: WDL. Paced rhythm  Pulm: HFNC during day and BiPAP at night. Coarse lungs. Good cough  GI/: TF increased to goal. Small smear BMs x2. Good UOP to coello- coello dc'd at 1700

## 2019-01-22 NOTE — PROGRESS NOTES
Nebulizer treatments given as scheduled. Patient was given acapella this morning to use, tolerates well. Can use independently. Weak productive cough. Wore bipap overnight, switched to heated high flow nasal cannula 35L 55% this morning. Tolerating well.     Shelley Carbajal, RT

## 2019-01-23 ENCOUNTER — APPOINTMENT (OUTPATIENT)
Dept: PHYSICAL THERAPY | Facility: CLINIC | Age: 79
DRG: 003 | End: 2019-01-23
Payer: COMMERCIAL

## 2019-01-23 ENCOUNTER — APPOINTMENT (OUTPATIENT)
Dept: GENERAL RADIOLOGY | Facility: CLINIC | Age: 79
DRG: 003 | End: 2019-01-23
Payer: COMMERCIAL

## 2019-01-23 LAB
ANION GAP SERPL CALCULATED.3IONS-SCNC: 8 MMOL/L (ref 3–14)
BUN SERPL-MCNC: 74 MG/DL (ref 7–30)
CALCIUM SERPL-MCNC: 8.8 MG/DL (ref 8.5–10.1)
CHLORIDE SERPL-SCNC: 102 MMOL/L (ref 94–109)
CO2 SERPL-SCNC: 29 MMOL/L (ref 20–32)
CREAT SERPL-MCNC: 1.3 MG/DL (ref 0.52–1.04)
ERYTHROCYTE [DISTWIDTH] IN BLOOD BY AUTOMATED COUNT: 18 % (ref 10–15)
GFR SERPL CREATININE-BSD FRML MDRD: 40 ML/MIN/{1.73_M2}
GLUCOSE BLDC GLUCOMTR-MCNC: 130 MG/DL (ref 70–99)
GLUCOSE BLDC GLUCOMTR-MCNC: 138 MG/DL (ref 70–99)
GLUCOSE BLDC GLUCOMTR-MCNC: 139 MG/DL (ref 70–99)
GLUCOSE BLDC GLUCOMTR-MCNC: 143 MG/DL (ref 70–99)
GLUCOSE BLDC GLUCOMTR-MCNC: 177 MG/DL (ref 70–99)
GLUCOSE SERPL-MCNC: 140 MG/DL (ref 70–99)
HCT VFR BLD AUTO: 23 % (ref 35–47)
HGB BLD-MCNC: 7.6 G/DL (ref 11.7–15.7)
INR PPP: 3.37 (ref 0.86–1.14)
LDH SERPL L TO P-CCNC: 296 U/L (ref 81–234)
MAGNESIUM SERPL-MCNC: 2.2 MG/DL (ref 1.6–2.3)
MCH RBC QN AUTO: 29.9 PG (ref 26.5–33)
MCHC RBC AUTO-ENTMCNC: 33 G/DL (ref 31.5–36.5)
MCV RBC AUTO: 91 FL (ref 78–100)
PHOSPHATE SERPL-MCNC: 2.7 MG/DL (ref 2.5–4.5)
PLATELET # BLD AUTO: 336 10E9/L (ref 150–450)
POTASSIUM SERPL-SCNC: 3.3 MMOL/L (ref 3.4–5.3)
POTASSIUM SERPL-SCNC: 4.3 MMOL/L (ref 3.4–5.3)
PROT SERPL-MCNC: 6.2 G/DL (ref 6.8–8.8)
RBC # BLD AUTO: 2.54 10E12/L (ref 3.8–5.2)
SODIUM SERPL-SCNC: 139 MMOL/L (ref 133–144)
WBC # BLD AUTO: 11.9 10E9/L (ref 4–11)

## 2019-01-23 PROCEDURE — 25000128 H RX IP 250 OP 636: Performed by: NURSE PRACTITIONER

## 2019-01-23 PROCEDURE — 25000132 ZZH RX MED GY IP 250 OP 250 PS 637: Performed by: INTERNAL MEDICINE

## 2019-01-23 PROCEDURE — 40000193 ZZH STATISTIC PT WARD VISIT: Performed by: PHYSICAL THERAPIST

## 2019-01-23 PROCEDURE — 85027 COMPLETE CBC AUTOMATED: CPT

## 2019-01-23 PROCEDURE — 20000003 ZZH R&B ICU

## 2019-01-23 PROCEDURE — 71045 X-RAY EXAM CHEST 1 VIEW: CPT

## 2019-01-23 PROCEDURE — 84100 ASSAY OF PHOSPHORUS: CPT

## 2019-01-23 PROCEDURE — 84132 ASSAY OF SERUM POTASSIUM: CPT

## 2019-01-23 PROCEDURE — 99233 SBSQ HOSP IP/OBS HIGH 50: CPT | Performed by: INTERNAL MEDICINE

## 2019-01-23 PROCEDURE — 25000132 ZZH RX MED GY IP 250 OP 250 PS 637: Performed by: NURSE PRACTITIONER

## 2019-01-23 PROCEDURE — 94640 AIRWAY INHALATION TREATMENT: CPT

## 2019-01-23 PROCEDURE — P9047 ALBUMIN (HUMAN), 25%, 50ML: HCPCS | Performed by: INTERNAL MEDICINE

## 2019-01-23 PROCEDURE — 83735 ASSAY OF MAGNESIUM: CPT

## 2019-01-23 PROCEDURE — 25000132 ZZH RX MED GY IP 250 OP 250 PS 637: Performed by: THORACIC SURGERY (CARDIOTHORACIC VASCULAR SURGERY)

## 2019-01-23 PROCEDURE — 36415 COLL VENOUS BLD VENIPUNCTURE: CPT

## 2019-01-23 PROCEDURE — 97110 THERAPEUTIC EXERCISES: CPT | Mod: GP | Performed by: PHYSICAL THERAPIST

## 2019-01-23 PROCEDURE — 40000275 ZZH STATISTIC RCP TIME EA 10 MIN

## 2019-01-23 PROCEDURE — 84155 ASSAY OF PROTEIN SERUM: CPT

## 2019-01-23 PROCEDURE — 25000128 H RX IP 250 OP 636: Performed by: INTERNAL MEDICINE

## 2019-01-23 PROCEDURE — 85610 PROTHROMBIN TIME: CPT

## 2019-01-23 PROCEDURE — 00000146 ZZHCL STATISTIC GLUCOSE BY METER IP

## 2019-01-23 PROCEDURE — 83615 LACTATE (LD) (LDH) ENZYME: CPT

## 2019-01-23 PROCEDURE — 94640 AIRWAY INHALATION TREATMENT: CPT | Mod: 76

## 2019-01-23 PROCEDURE — 25000132 ZZH RX MED GY IP 250 OP 250 PS 637: Performed by: STUDENT IN AN ORGANIZED HEALTH CARE EDUCATION/TRAINING PROGRAM

## 2019-01-23 PROCEDURE — 94660 CPAP INITIATION&MGMT: CPT

## 2019-01-23 PROCEDURE — 80048 BASIC METABOLIC PNL TOTAL CA: CPT

## 2019-01-23 PROCEDURE — 27210437 ZZH NUTRITION PRODUCT SEMIELEM INTERMED LITER

## 2019-01-23 PROCEDURE — 25000125 ZZHC RX 250: Performed by: NURSE PRACTITIONER

## 2019-01-23 RX ORDER — FUROSEMIDE 10 MG/ML
60 INJECTION INTRAMUSCULAR; INTRAVENOUS EVERY 12 HOURS
Status: DISCONTINUED | OUTPATIENT
Start: 2019-01-23 | End: 2019-01-24 | Stop reason: ALTCHOICE

## 2019-01-23 RX ORDER — POTASSIUM CHLORIDE 1.5 G/1.58G
20 POWDER, FOR SOLUTION ORAL DAILY
Status: DISCONTINUED | OUTPATIENT
Start: 2019-01-23 | End: 2019-01-29 | Stop reason: HOSPADM

## 2019-01-23 RX ORDER — QUETIAPINE FUMARATE 25 MG/1
25 TABLET, FILM COATED ORAL AT BEDTIME
Status: DISCONTINUED | OUTPATIENT
Start: 2019-01-23 | End: 2019-01-28

## 2019-01-23 RX ADMIN — QUETIAPINE 25 MG: 25 TABLET ORAL at 08:45

## 2019-01-23 RX ADMIN — IPRATROPIUM BROMIDE AND ALBUTEROL SULFATE 3 ML: .5; 2.5 SOLUTION RESPIRATORY (INHALATION) at 16:46

## 2019-01-23 RX ADMIN — ACETAMINOPHEN 650 MG: 325 TABLET, FILM COATED ORAL at 15:30

## 2019-01-23 RX ADMIN — HYDROMORPHONE HYDROCHLORIDE 0.5 MG: 1 INJECTION, SOLUTION INTRAMUSCULAR; INTRAVENOUS; SUBCUTANEOUS at 21:02

## 2019-01-23 RX ADMIN — FUROSEMIDE 60 MG: 10 INJECTION, SOLUTION INTRAVENOUS at 22:03

## 2019-01-23 RX ADMIN — POTASSIUM CHLORIDE 20 MEQ: 1.5 POWDER, FOR SOLUTION ORAL at 06:36

## 2019-01-23 RX ADMIN — POTASSIUM CHLORIDE 20 MEQ: 1.5 POWDER, FOR SOLUTION ORAL at 09:53

## 2019-01-23 RX ADMIN — QUETIAPINE 25 MG: 25 TABLET ORAL at 21:05

## 2019-01-23 RX ADMIN — Medication 40 MG: at 08:45

## 2019-01-23 RX ADMIN — IPRATROPIUM BROMIDE AND ALBUTEROL SULFATE 3 ML: .5; 2.5 SOLUTION RESPIRATORY (INHALATION) at 08:15

## 2019-01-23 RX ADMIN — PHYTONADIONE 5 MG: 10 INJECTION, EMULSION INTRAMUSCULAR; INTRAVENOUS; SUBCUTANEOUS at 12:43

## 2019-01-23 RX ADMIN — Medication 5 MG: at 21:04

## 2019-01-23 RX ADMIN — ALBUMIN HUMAN 12.5 G: 0.25 SOLUTION INTRAVENOUS at 08:45

## 2019-01-23 RX ADMIN — OXYCODONE HYDROCHLORIDE 5 MG: 5 TABLET ORAL at 15:30

## 2019-01-23 RX ADMIN — IPRATROPIUM BROMIDE AND ALBUTEROL SULFATE 3 ML: .5; 2.5 SOLUTION RESPIRATORY (INHALATION) at 11:32

## 2019-01-23 RX ADMIN — ASPIRIN 81 MG 81 MG: 81 TABLET ORAL at 08:45

## 2019-01-23 RX ADMIN — NYSTATIN 500000 UNITS: 500000 SUSPENSION ORAL at 00:11

## 2019-01-23 RX ADMIN — METOPROLOL TARTRATE 25 MG: 25 TABLET ORAL at 08:45

## 2019-01-23 RX ADMIN — POTASSIUM CHLORIDE 40 MEQ: 1.5 POWDER, FOR SOLUTION ORAL at 04:38

## 2019-01-23 RX ADMIN — NYSTATIN 500000 UNITS: 500000 SUSPENSION ORAL at 06:36

## 2019-01-23 RX ADMIN — FUROSEMIDE 60 MG: 10 INJECTION, SOLUTION INTRAVENOUS at 09:53

## 2019-01-23 RX ADMIN — METOPROLOL TARTRATE 25 MG: 25 TABLET ORAL at 21:04

## 2019-01-23 RX ADMIN — ACETAMINOPHEN 650 MG: 325 TABLET, FILM COATED ORAL at 04:30

## 2019-01-23 RX ADMIN — IPRATROPIUM BROMIDE AND ALBUTEROL SULFATE 3 ML: .5; 2.5 SOLUTION RESPIRATORY (INHALATION) at 19:33

## 2019-01-23 ASSESSMENT — ACTIVITIES OF DAILY LIVING (ADL)
ADLS_ACUITY_SCORE: 17
ADLS_ACUITY_SCORE: 16

## 2019-01-23 ASSESSMENT — MIFFLIN-ST. JEOR: SCORE: 1441.16

## 2019-01-23 NOTE — PROGRESS NOTES
Patient on BiPAP 14/7 30% overnight. SPO2@ 98%. Alarm volume set at 10. Gel pad/mepilex on. BBS coarse. RT will continue to follow.

## 2019-01-23 NOTE — PROGRESS NOTES
Patient was taken off of HFNC per nursing this afternoon. Pt was placed on a 2L NC with SpO2 in the mid to upper 90's. Patient was then placed on BiPAP 14/7 30% around 1800 due to increased SOB with SpO2 in the upper 90's. BS coarse. Gel pad and mepilex in place. Skin intact. Alarm volume set at 10. Will cont BiPAP overnight and will assess for a trial off of BiPAP in the morning.  1/22/2019  Pam Ogden RRT

## 2019-01-23 NOTE — PLAN OF CARE
Discharge Planner PT   Patient plan for discharge: None stated  Current status: Fatigued, RR upper 20s to mid 30s at rest and with activity. Per RN pt up to chair earlier today via lift. Pt declines up to chair at this time, participated B LE strength/AROM and core strength exercises. Up right in the chair position of the bed upon PT exit.  Barriers to return to prior living situation: Level of assist, impaired activity tolerance, fatigue, not yet ambulated since admit.  Recommendations for discharge: Acute Rehab  Rationale for recommendations: Pt will benefit from continued skilled rehab services to progress independence and safety with activity, improve exercise tolerance and continue education for optimal heart health.        Entered by: Na Nassar 01/23/2019 5:49 PM

## 2019-01-23 NOTE — PROGRESS NOTES
[facility]    Hospitalist Progress Note    Interval History   - SOB with HFNC, back to BiPap this AM. Alert and oriented, denies pain. Hungry and thirsty.  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 Hospital course complicated by prolonged respiratory failure with failed extubation trials, ultimately successfully extubated on 1/18, a-fib/RVR requiring ablation and pacemaker placement on 1/11, diarrhea initially concerning for microscopic colitis, IMAN, and severe deconditioning. Hospitalist service consulted for continuing medical management on 1/23/2019. Continues to require intermittent BiPap 1/23/2019.     Type A aortic dissection s/p repair with graft and repair of bilateral femoral vessels  Cardiogenic shock, resolved  Presented with acute onset back and jaw pain. CTA on arrival showed type A aortic dissection with extensive flap extending from the ascending thoracic aorta all the way to the iliac bifurcation. CV Surgery was notified in the ED, and patient underwent emergent dissection repair and bilateral femoral vessel cutdown/repair by Dr. Winslow, Dr. Mabry, and Dr. Jennings. Bypass time 114 min. Weaned off pressors on 1/5.   - Management as per CV Surgery  - PT/OT, cardiac rehab     Acute hypoxic respiratory failure, multifactorial  Bilateral pleural effusion  Pulmonary vascular congestion  Intubated for surgery, extubated on 1/5, and then re-intubated on 1/12. Potential tracheostomy was discussed, but patient ultimately passed extubation trial on 1/18, and has remained stable.  Respiratory failure has been attributed to pulmonary edema from aggressive fluid resuscitation perioperatively and  decreased reserve from paralyzed right hemidiaphragm. TTE (1/7) showed LVEF 55-60% and indeterminate diastolic function. CXR 1/23 with persistent bilateral pleural effusions R > L, pulmonary vascular congestion.  - On HFNC 35L, 55% fiO2, biPAP QID - wean as per RT  - Lasix 60mg IV BID per nephrology  - Continues on duonebs QID  - Encourage aggressive pulm toilet: incentive spirometry, acapella valve  - Plan thoracentesis, diagnostic and therapeutic tomorrow   - Reverse INR today with IV vit K 5mg     IMAN, Improved: Creatinine up to 1.99 from baseline ~0.8, due to ATN from decreased perfusion - dissection involving right renal artery and acute blood loss from surgery. Renogram (1/3) showed decreased renal function bilaterally (estimated 34% of the right kidney, 66% function of the left).  - Nephrology following, appreciate recs   - Creatinine 1.3 today   - Diuresing per nephrology     Dysphagia due to prolonged extubation and severe deconditioning  Severe malnutrition due to acute illness  - Remains NPO per SLP  - On tube feeds as per Nutrition  - SLP and Nutrition following, appreciate assistance    Chronic/Stable  Acute blood loss anemia due to surgery: Baseline Hgb 11-13. EBL 1.2L. She has received 2 units prbcs since her surgery.   - Hgb has been stable in the mid 7 range    Atrial fibrillation with RVR s/p AVN ablation and pacemaker 1/11: Developed persistent a-fib/RVR following surgery, and failed attempt at medical management. She ultimately underwent AV node ablation with uneventful pacemaker placement by Cardiology on 1/11. She has been since been maintained on metoprolol and warfarin for CHADS2-VASc score 3. Cardiology has signed off. Follow up in device clinic.   - Continue metoprolol  - Hold warfarin today for thoracentesis tomorrow    Essential hypertension: SBP 110s-140s. Holding PTA hydrochlorothiazide. Continue metoprolol.  ICU delirium, improved   - Decrease seroquel to at bedtime  Diarrhea,  Improving: Developed persistent diarrhea post-op. C.diff PCR negative. GI was consulted - there was some concern for microscopic colitis, but biopsies returned negative. Her diarrhea has since been improving.     DVT Prophylaxis: Warfarin  Code Status: Full Code  PT/OT: Ordered    Disposition: Expected discharge pending improvement in respiratory status    Jonas De La Rosa MD  Text Page  (7am to 6pm)  -Data reviewed today: I reviewed all new labs and imaging results over the last 24 hours.    Physical Exam   Temp: 100.5  F (38.1  C) Temp src: Axillary BP: 112/58 Pulse: 69 Heart Rate: 69 Resp: 21 SpO2: 96 % O2 Device: BiPAP/CPAP Oxygen Delivery: 2 LPM  Vitals:    01/21/19 0600 01/22/19 0600 01/23/19 0400   Weight: 99.2 kg (218 lb 11.1 oz) 97.8 kg (215 lb 9.8 oz) 96.6 kg (213 lb)     Vital Signs with Ranges  Temp:  [98.6  F (37  C)-100.5  F (38.1  C)] 100.5  F (38.1  C)  Pulse:  [68-71] 69  Heart Rate:  [61-73] 69  Resp:  [12-41] 21  BP: ()/(54-97) 112/58  FiO2 (%):  [30 %-60 %] 30 %  SpO2:  [87 %-100 %] 96 %  I/O last 3 completed shifts:  In: 2540 [I.V.:140; NG/GT:1200]  Out: 1880 [Urine:1880]  O2 requirements: BiPap    Constitutional: Female appears generally ill  Cardiovascular: RRR  Respiratory: Decreased breath sounds in both bases, generally coarse  Vascular: 2-3+ BLE pitting edema to thighs  GI: Normoactive bowel sounds, nontender  Neuro/Psych: Appropriate affect and mood. moves all extremities    Medications     IV fluid REPLACEMENT ONLY       - MEDICATION INSTRUCTIONS -       - MEDICATION INSTRUCTIONS -       sodium chloride Stopped (01/22/19 1020)     Warfarin Therapy Reminder         aspirin  81 mg Per Feeding Tube Daily     furosemide  60 mg Intravenous Q12H     influenza Vac Split High-Dose  0.5 mL Intramuscular Prior to discharge     insulin aspart  1-6 Units Subcutaneous Q4H     ipratropium - albuterol 0.5 mg/2.5 mg/3 mL  3 mL Nebulization 4x daily     lidocaine (PF)  10 mL Subcutaneous Once      melatonin  5 mg Sublingual At Bedtime     metoprolol tartrate  25 mg Per Feeding Tube BID     nystatin  500,000 Units Oral 4x Daily     pantoprazole  40 mg Oral QAM AC     potassium chloride  20 mEq Oral Daily     QUEtiapine  25 mg Oral BID     sodium chloride (PF)  10 mL Intracatheter Q8H     warfarin-No DOSE today  1 each Does not apply no dose today (warfarin)       Data   Recent Labs   Lab 01/23/19  0415 01/22/19  0435 01/21/19  0430   WBC 11.9* 12.0* 14.8*   HGB 7.6* 7.6* 8.0*   MCV 91 90 89    288 346   INR 3.37* 2.43* 2.16*    141 138   POTASSIUM 3.3* 3.4 3.4   CHLORIDE 102 103 101   CO2 29 30 30   BUN 74* 71* 72*   CR 1.30* 1.28* 1.27*   ANIONGAP 8 8 7   BESS 8.8 8.4* 8.3*   * 133* 112*   ALBUMIN  --   --  3.6   PROTTOTAL  --   --  5.8*   BILITOTAL  --   --  1.0   ALKPHOS  --   --  73   ALT  --   --  22   AST  --   --  15       Imaging:   Recent Results (from the past 24 hour(s))   XR Chest Port 1 View    Narrative    XR CHEST PORT 1 VW  1/23/2019 9:45 AM     HISTORY:  evaluate for effusions, infiltrates, fluid overload    COMPARISON: Film performed on 1/19/2019    FINDINGS:  A left cardiac device is in place. A feeding tube is in  place. Midline sternotomy. Bilateral pleural effusions are again  noted. Pulmonary vascular congestion. When compared to 1/19/2019,  there is been no significant change.      Impression    IMPRESSION: Stable, no significant change since 1/19/2019.    RENATA FREIRE MD

## 2019-01-23 NOTE — PROGRESS NOTES
Renal Medicine Progress Note             Assessment/Plan:         1.  Non-oliguric IMAN/CKD.  - resolving ATN ( 2/2 AAA, along with renal vascular injury) Creatinine plateaued and stable.      2.  S/p aortic dissection/repair.  Stable.  A.  Per CV Surg.    3.  SOB/hypoxia/pneumonia - reports breathing is slightly worse today . On BiPaP. Volume overloaded and in positive fluid balance yesterday .   - increase Lasix to 60 mg IV BID.    - Minimize intake as possible. Free water flushes have been decreased.     4.  Anemia. Hgb slowly downtrending.  A.  Follow labs.  B.  Transfuse prn.     5.  FEN.   Na is nl.  Free water to 200 q 2h to minimize input  - low K  - continue replacement as you are doing.      6. HTN - Bp well controlled.         Interval History:     Awake/alert.  On BiPap overnight.   Feels tired.   Decent UOP but was + 900cc yesterday with high obligate intake, although weight downtrending.    NO nausea./ vomiting.  Feels breathing is a bit harder today .    On tube feed.   BP 110s/ 60s  UOP 1.8 L . S.Cr stable            Medications and Allergies:       albumin human  12.5 g Intravenous Q12H     aspirin  81 mg Per Feeding Tube Daily     furosemide  40 mg Intravenous Q12H     influenza Vac Split High-Dose  0.5 mL Intramuscular Prior to discharge     insulin aspart  1-6 Units Subcutaneous Q4H     ipratropium - albuterol 0.5 mg/2.5 mg/3 mL  3 mL Nebulization 4x daily     lidocaine (PF)  10 mL Subcutaneous Once     melatonin  5 mg Sublingual At Bedtime     metoprolol tartrate  25 mg Per Feeding Tube BID     nystatin  500,000 Units Oral 4x Daily     pantoprazole  40 mg Oral QAM AC     QUEtiapine  25 mg Oral BID     sodium chloride (PF)  10 mL Intracatheter Q8H     warfarin-No DOSE today  1 each Does not apply no dose today (warfarin)     Allergies   Allergen Reactions     Amoxicillin Swelling     Ciprofloxacin Swelling          Physical Exam:     Vitals were reviewed  Heart Rate: 70, Blood pressure 126/62,  "pulse 69, temperature 100.5  F (38.1  C), temperature source Axillary, resp. rate (!) 35, height 1.626 m (5' 4\"), weight 96.6 kg (213 lb), SpO2 100 %.  Wt Readings from Last 3 Encounters:   01/23/19 96.6 kg (213 lb)   09/14/08 80.7 kg (178 lb)       GENERAL APPEARANCE: , awake, alert, interactive, on Bipap  HEENT:  Eyes/ears/nose/neck grossly nl x + high flow nc o2  RESP: + few wheezes, rhonchi , diminished breath sounds at bases  CV: RRR c 2/6 m, nl S1/S2, + pacer; sternal incision looks fine  ABDOMEN: o/s/nt/nd  EXTREMITIES/SKIN: 2+ edema  NEURO:  eyes open, moves ext, follows all commands properly  LINES:  + coello c yellow urine; + RIJ 3-lumen         Data:     CBC RESULTS:     Recent Labs   Lab 01/23/19 0415 01/22/19  0435 01/21/19  0430 01/20/19  0400 01/19/19  0430 01/18/19  0502   WBC 11.9* 12.0* 14.8* 16.2* 13.0* 12.5*   RBC 2.54* 2.56* 2.72* 2.65* 2.57* 2.48*   HGB 7.6* 7.6* 8.0* 7.8* 7.5* 7.2*   HCT 23.0* 23.1* 24.3* 23.7* 22.6* 21.9*    288 346 322 228 193  Canceled, Test credited     Basic Metabolic Panel:  Recent Labs   Lab 01/23/19 0415 01/22/19  0435 01/21/19  0430 01/20/19  0400 01/19/19  0430 01/18/19  0502    141 138 136 138 139   POTASSIUM 3.3* 3.4 3.4 3.6 3.9 4.4   CHLORIDE 102 103 101 100 102 105   CO2 29 30 30 26 27 24   BUN 74* 71* 72* 74* 73* 69*   CR 1.30* 1.28* 1.27* 1.56* 1.71* 1.73*   * 133* 112* 120* 96 120*   BESS 8.8 8.4* 8.3* 8.5 8.3* 8.1*     INR  Recent Labs   Lab 01/23/19  0415 01/22/19  0435 01/21/19  0430 01/20/19  0400   INR 3.37* 2.43* 2.16* 1.71*      Attestation:   I have reviewed today's relevant vital signs, notes, medications, labs and imaging.    Vikash Monroe MD  [facility] - Nephrology   [phone]    "

## 2019-01-23 NOTE — PLAN OF CARE
Attempted to see patient for swallowing treatment, however, patient sleeping and requiring BiPAP support at this time.  Patient tolerating periods of HFNC at 35L at times, per chart.  Recommend oral cares with mouthwash and encourage dry swallows when on HFNC.

## 2019-01-23 NOTE — PLAN OF CARE
Patient alert, disoriented to time. PERRLA. Follows commands. Lung sounds coarse/expiratory wheezes at times. Denies shortness of breath. BiPAP on overnight. BP stable. Denies pain. Tylenol given for elevated temperature. Potassium being replaced. Plan to transfer.

## 2019-01-23 NOTE — PROGRESS NOTES
[facility]  Cardiovascular and Thoracic Surgery Daily Note          Assessment and Plan:   POD#19 s/p Left groin cutdown with exposure of the femoral vessels with arterial cannulation for cardiopulmonary bypass. Right groin cutdown with exposure of the femoral vessels with the right femoral artery ultimately used for diagnostic angiography. 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 Visceral angiography. Repair of the bilateral femoral vessels. By Dr. Jose Winslow.    Main Plans for today:  1. Continue HFNC and Bipap with rest/ pulm hygiene  2. Work with therapies  3. Diuresis at direction of nephrology    CVS:   -130/60-70s, HR 80/ 100% VVI paced.  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 On oral amiodarone and coumadin, INR 2.43 today. Some PVCs this AM, EKG stable.  Pulm:  Extubated per protocol and reintubated on POD #8 for resp distress.  CT chest on 1/14 demonstrates RLL compressive atelectasis 2/2 elevated hemidiaphragm (present preoperatively). CXR/CT chest with fluid in fissures. POD #7 left US guided thoracentesis with removal of 150 mL fluid.   Extubated on POD #14 and has been tolerating Hi Flow NC 35LPM/45% and bipap with rest.  This morning she is bipap dependent as she was feeling chest tightness on HFNC - continue nebs.  Repeat CXR demonstrates persistent fluid overload. Kourtney with assistance of nephrology.  Dysphagia 2/2 deconditioning - currently strict NPO as high aspiration risk.  Neuro:  Grossly intact, weakly follows commands. Confused. On seroquel.  Pain well managed with current regimen.  Deconditioned - working with PT/OT/speech  Renal:  IMAN -BL Creat ~0.8, 1.3 today; Preop weight 77.1 kg, today 96.6 kg, down 1.2 kg overnight.   Renal US on 1/6 with patent flow in bilateral renal arteries. Right  kidney atrophic.  Renal on board. Appreciate. Diuresing well at direction nephrology - appreciate.  Fluid overloaded with significant edema.  BMP in AM  GI:  Malnourished. Receiving tube feedings via NJ feeding tube.  Five day course of oral prednisone completed for suspected microscopic colitis, flex sig biopsy negative. Loose stools improving.  CT abdomen on  did not demonstrate acute pathology.  Dysphagia 2/2 deconditioning; Speech consulted, strict NPO for now   and  negative for c. Diff  On PPI  :  Barrientos discontinued on ; external incontinence  in place  Endo:   Preop A1C 5.3; managed on sliding scale insulin. Goal BG <180  FEN:   Replace lytes as needed; Continue tube feedings via postpyloric feeding tube  Speech consult - appreciate  ID:   Temp (24hrs), Av.5  F (37.5  C), Min:98.6  F (37  C), Max:100.5  F (38.1  C)  Stress and steroid induced leukocytosis. WBC 11.9.   No further antibiotics.  Sputum + candida,on nystatin; BC NTD.  Heme:   Acute blood loss anemia and thrombocytopenia: Hgb 7.6; Plt 336  Tubes/Drains:  Feeding tube  Proph:   BB, statin, ASA, coumadin, PCD, PPI  Dispo:   Continue in ICU with close monitoring  Appreciate consulting services          Interval History:   Lying in bed on bipap, RR mid 30s. Oriented to self, year. No acute events overnight.         Medications:       aspirin  81 mg Per Feeding Tube Daily     furosemide  60 mg Intravenous Q12H     influenza Vac Split High-Dose  0.5 mL Intramuscular Prior to discharge     insulin aspart  1-6 Units Subcutaneous Q4H     ipratropium - albuterol 0.5 mg/2.5 mg/3 mL  3 mL Nebulization 4x daily     lidocaine (PF)  10 mL Subcutaneous Once     melatonin  5 mg Sublingual At Bedtime     metoprolol tartrate  25 mg Per Feeding Tube BID     nystatin  500,000 Units Oral 4x Daily     pantoprazole  40 mg Oral QAM AC     potassium chloride  20 mEq Oral Daily     QUEtiapine  25 mg Oral BID     sodium chloride (PF)  10 mL  "Intracatheter Q8H     warfarin-No DOSE today  1 each Does not apply no dose today (warfarin)     acetaminophen, albuterol, IV fluid REPLACEMENT ONLY, glucose **OR** dextrose **OR** glucagon, heparin lock flush, hydrALAZINE, HYDROmorphone, lidocaine 4%, lidocaine 4%, lidocaine (buffered or not buffered), lidocaine (buffered or not buffered), magnesium sulfate, magnesium sulfate, - MEDICATION INSTRUCTIONS -, - MEDICATION INSTRUCTIONS -, methocarbamol, naloxone, ondansetron **OR** ondansetron, oxyCODONE IR, potassium chloride, potassium chloride with lidocaine, potassium chloride, potassium chloride, potassium chloride, QUEtiapine, sodium chloride (PF), sodium chloride (PF), sodium chloride (PF), [COMPLETED] sodium phosphate **FOLLOWED BY** sodium phosphate, sodium phosphate, sodium phosphate, sodium phosphate, sodium phosphate, Warfarin Therapy Reminder          Physical Exam:   Vitals were reviewed  Blood pressure 112/58, pulse 69, temperature 100.5  F (38.1  C), temperature source Axillary, resp. rate 21, height 1.626 m (5' 4\"), weight 96.6 kg (213 lb), SpO2 96 %.  Rhythm: VVI paced at 80 on bedside monitor    Lungs: Wheezy and diminshed throughout - on HFNC    Cardiovascular: Distant, S1, S2, RRR, no m/r/g.     Abdomen: Distended/NT. + BS. NJ in place, incontinent of stool    Extremeties: 2-3+ edema with 1+ palp pedal pulses    Incision(s): Sternal, left chest, and right femoral incisions CDI    CT: N/A    Weight:   Vitals:    01/19/19 0000 01/20/19 0600 01/21/19 0600 01/22/19 0600   Weight: 98 kg (216 lb 0.8 oz) 100.3 kg (221 lb 1.9 oz) 99.2 kg (218 lb 11.1 oz) 97.8 kg (215 lb 9.8 oz)    01/23/19 0400   Weight: 96.6 kg (213 lb)            Data:    All labs and imaging reviewed by me.  Labs:   Lab Results   Component Value Date    WBC 12.5 01/18/2019     Lab Results   Component Value Date    RBC 2.48 01/18/2019     Lab Results   Component Value Date    HGB 7.2 01/18/2019     Lab Results   Component Value Date    " HCT 21.9 01/18/2019     No components found for: MCT  Lab Results   Component Value Date    MCV 88 01/18/2019     Lab Results   Component Value Date    MCH 29.0 01/18/2019     Lab Results   Component Value Date    MCHC 32.9 01/18/2019     Lab Results   Component Value Date    RDW 18.1 01/18/2019     Lab Results   Component Value Date    PLT Canceled, Test credited 01/18/2019     01/18/2019       Last Basic Metabolic Panel:  Lab Results   Component Value Date     01/18/2019      Lab Results   Component Value Date    POTASSIUM 4.4 01/18/2019     Lab Results   Component Value Date    CHLORIDE 105 01/18/2019     Lab Results   Component Value Date    BESS 8.1 01/18/2019     Lab Results   Component Value Date    CO2 24 01/18/2019     Lab Results   Component Value Date    BUN 69 01/18/2019     Lab Results   Component Value Date    CR 1.73 01/18/2019     Lab Results   Component Value Date     01/18/2019 1/23/2019 CXR:  Pending radiology read  Clinical read appears fluid overload    Maggy Álvarez, THELMAC, CCRN-CSC  CV Surgery  [facility] Pager: [phone]  Personal Pager: [phone]

## 2019-01-24 ENCOUNTER — ANESTHESIA (OUTPATIENT)
Dept: INTENSIVE CARE | Facility: CLINIC | Age: 79
DRG: 003 | End: 2019-01-24
Payer: COMMERCIAL

## 2019-01-24 ENCOUNTER — ANESTHESIA EVENT (OUTPATIENT)
Dept: INTENSIVE CARE | Facility: CLINIC | Age: 79
DRG: 003 | End: 2019-01-24
Payer: COMMERCIAL

## 2019-01-24 ENCOUNTER — APPOINTMENT (OUTPATIENT)
Dept: GENERAL RADIOLOGY | Facility: CLINIC | Age: 79
DRG: 003 | End: 2019-01-24
Payer: COMMERCIAL

## 2019-01-24 ENCOUNTER — APPOINTMENT (OUTPATIENT)
Dept: ULTRASOUND IMAGING | Facility: CLINIC | Age: 79
DRG: 003 | End: 2019-01-24
Payer: COMMERCIAL

## 2019-01-24 LAB
ALBUMIN SERPL-MCNC: 3.1 G/DL (ref 3.4–5)
ALBUMIN UR-MCNC: 30 MG/DL
ANION GAP SERPL CALCULATED.3IONS-SCNC: 10 MMOL/L (ref 3–14)
ANION GAP SERPL CALCULATED.3IONS-SCNC: 10 MMOL/L (ref 3–14)
APPEARANCE FLD: NORMAL
APPEARANCE UR: ABNORMAL
BACTERIA #/AREA URNS HPF: ABNORMAL /HPF
BASE EXCESS BLDA CALC-SCNC: 2.3 MMOL/L
BILIRUB UR QL STRIP: NEGATIVE
BUN SERPL-MCNC: 76 MG/DL (ref 7–30)
BUN SERPL-MCNC: 83 MG/DL (ref 7–30)
CALCIUM SERPL-MCNC: 8 MG/DL (ref 8.5–10.1)
CALCIUM SERPL-MCNC: 8.5 MG/DL (ref 8.5–10.1)
CHLORIDE SERPL-SCNC: 101 MMOL/L (ref 94–109)
CHLORIDE SERPL-SCNC: 104 MMOL/L (ref 94–109)
CO2 SERPL-SCNC: 27 MMOL/L (ref 20–32)
CO2 SERPL-SCNC: 27 MMOL/L (ref 20–32)
COLOR FLD: NORMAL
COLOR UR AUTO: YELLOW
CREAT SERPL-MCNC: 1.47 MG/DL (ref 0.52–1.04)
CREAT SERPL-MCNC: 1.75 MG/DL (ref 0.52–1.04)
CREAT UR-MCNC: 102 MG/DL
ERYTHROCYTE [DISTWIDTH] IN BLOOD BY AUTOMATED COUNT: 18.2 % (ref 10–15)
ERYTHROCYTE [DISTWIDTH] IN BLOOD BY AUTOMATED COUNT: 18.4 % (ref 10–15)
FRACT EXCRET NA UR+SERPL-RTO: 0.3 %
GFR SERPL CREATININE-BSD FRML MDRD: 28 ML/MIN/{1.73_M2}
GFR SERPL CREATININE-BSD FRML MDRD: 34 ML/MIN/{1.73_M2}
GLUCOSE BLDC GLUCOMTR-MCNC: 122 MG/DL (ref 70–99)
GLUCOSE BLDC GLUCOMTR-MCNC: 143 MG/DL (ref 70–99)
GLUCOSE BLDC GLUCOMTR-MCNC: 150 MG/DL (ref 70–99)
GLUCOSE BLDC GLUCOMTR-MCNC: 158 MG/DL (ref 70–99)
GLUCOSE BLDC GLUCOMTR-MCNC: 158 MG/DL (ref 70–99)
GLUCOSE BLDC GLUCOMTR-MCNC: 161 MG/DL (ref 70–99)
GLUCOSE BLDC GLUCOMTR-MCNC: 167 MG/DL (ref 70–99)
GLUCOSE FLD-MCNC: 115 MG/DL
GLUCOSE SERPL-MCNC: 148 MG/DL (ref 70–99)
GLUCOSE SERPL-MCNC: 154 MG/DL (ref 70–99)
GLUCOSE UR STRIP-MCNC: NEGATIVE MG/DL
GRAM STN SPEC: NORMAL
HCO3 BLD-SCNC: 26 MMOL/L (ref 21–28)
HCT VFR BLD AUTO: 22.5 % (ref 35–47)
HCT VFR BLD AUTO: 23.3 % (ref 35–47)
HGB BLD-MCNC: 7.3 G/DL (ref 11.7–15.7)
HGB BLD-MCNC: 7.5 G/DL (ref 11.7–15.7)
HGB UR QL STRIP: NEGATIVE
HYALINE CASTS #/AREA URNS LPF: 59 /LPF (ref 0–2)
INR PPP: 1.57 (ref 0.86–1.14)
KETONES UR STRIP-MCNC: NEGATIVE MG/DL
LDH FLD L TO P-CCNC: 186 U/L
LEUKOCYTE ESTERASE UR QL STRIP: ABNORMAL
LMWH PPP CHRO-ACNC: <0.1 IU/ML
LYMPHOCYTES NFR FLD MANUAL: 12 %
MCH RBC QN AUTO: 29.3 PG (ref 26.5–33)
MCH RBC QN AUTO: 29.5 PG (ref 26.5–33)
MCHC RBC AUTO-ENTMCNC: 32.2 G/DL (ref 31.5–36.5)
MCHC RBC AUTO-ENTMCNC: 32.4 G/DL (ref 31.5–36.5)
MCV RBC AUTO: 90 FL (ref 78–100)
MCV RBC AUTO: 92 FL (ref 78–100)
MONOS+MACROS NFR FLD MANUAL: 4 %
MUCOUS THREADS #/AREA URNS LPF: PRESENT /LPF
NEUTS BAND NFR FLD MANUAL: 77 %
NITRATE UR QL: NEGATIVE
OTHER CELLS FLD MANUAL: 7 %
OXYHGB MFR BLD: 63 % (ref 92–100)
PCO2 BLD: 33 MM HG (ref 35–45)
PH BLD: 7.5 PH (ref 7.35–7.45)
PH UR STRIP: 5.5 PH (ref 5–7)
PHOSPHATE SERPL-MCNC: 4 MG/DL (ref 2.5–4.5)
PHOSPHATE SERPL-MCNC: 4 MG/DL (ref 2.5–4.5)
PLATELET # BLD AUTO: 375 10E9/L (ref 150–450)
PLATELET # BLD AUTO: 407 10E9/L (ref 150–450)
PO2 BLD: 33 MM HG (ref 80–105)
POTASSIUM SERPL-SCNC: 4.1 MMOL/L (ref 3.4–5.3)
POTASSIUM SERPL-SCNC: 4.9 MMOL/L (ref 3.4–5.3)
PROCALCITONIN SERPL-MCNC: 1.04 NG/ML
PROT FLD-MCNC: 2.8 G/DL
RBC # BLD AUTO: 2.49 10E12/L (ref 3.8–5.2)
RBC # BLD AUTO: 2.54 10E12/L (ref 3.8–5.2)
RBC #/AREA URNS AUTO: 5 /HPF (ref 0–2)
SODIUM SERPL-SCNC: 138 MMOL/L (ref 133–144)
SODIUM SERPL-SCNC: 141 MMOL/L (ref 133–144)
SODIUM UR-SCNC: 22 MMOL/L
SOURCE: ABNORMAL
SP GR UR STRIP: 1.01 (ref 1–1.03)
SPECIMEN SOURCE FLD: NORMAL
SPECIMEN SOURCE: NORMAL
SPECIMEN SOURCE: NORMAL
SQUAMOUS #/AREA URNS AUTO: <1 /HPF (ref 0–1)
UROBILINOGEN UR STRIP-MCNC: NORMAL MG/DL (ref 0–2)
WBC # BLD AUTO: 13.2 10E9/L (ref 4–11)
WBC # BLD AUTO: 13.8 10E9/L (ref 4–11)
WBC # FLD AUTO: 599 /UL
WBC #/AREA URNS AUTO: 36 /HPF (ref 0–5)

## 2019-01-24 PROCEDURE — 25000132 ZZH RX MED GY IP 250 OP 250 PS 637: Performed by: INTERNAL MEDICINE

## 2019-01-24 PROCEDURE — 25000132 ZZH RX MED GY IP 250 OP 250 PS 637: Performed by: NURSE PRACTITIONER

## 2019-01-24 PROCEDURE — 25000125 ZZHC RX 250: Performed by: STUDENT IN AN ORGANIZED HEALTH CARE EDUCATION/TRAINING PROGRAM

## 2019-01-24 PROCEDURE — 25000128 H RX IP 250 OP 636: Performed by: NURSE PRACTITIONER

## 2019-01-24 PROCEDURE — 25000128 H RX IP 250 OP 636: Performed by: NURSE ANESTHETIST, CERTIFIED REGISTERED

## 2019-01-24 PROCEDURE — 87070 CULTURE OTHR SPECIMN AEROBIC: CPT | Performed by: NURSE PRACTITIONER

## 2019-01-24 PROCEDURE — 40000008 ZZH STATISTIC AIRWAY CARE

## 2019-01-24 PROCEDURE — 84100 ASSAY OF PHOSPHORUS: CPT

## 2019-01-24 PROCEDURE — 36600 WITHDRAWAL OF ARTERIAL BLOOD: CPT

## 2019-01-24 PROCEDURE — 40000275 ZZH STATISTIC RCP TIME EA 10 MIN

## 2019-01-24 PROCEDURE — 87070 CULTURE OTHR SPECIMN AEROBIC: CPT | Performed by: INTERNAL MEDICINE

## 2019-01-24 PROCEDURE — 0W993ZZ DRAINAGE OF RIGHT PLEURAL CAVITY, PERCUTANEOUS APPROACH: ICD-10-PCS | Performed by: RADIOLOGY

## 2019-01-24 PROCEDURE — 85027 COMPLETE CBC AUTOMATED: CPT | Performed by: NURSE PRACTITIONER

## 2019-01-24 PROCEDURE — 25000128 H RX IP 250 OP 636: Performed by: INTERNAL MEDICINE

## 2019-01-24 PROCEDURE — 89051 BODY FLUID CELL COUNT: CPT | Performed by: INTERNAL MEDICINE

## 2019-01-24 PROCEDURE — 31500 INSERT EMERGENCY AIRWAY: CPT

## 2019-01-24 PROCEDURE — 94640 AIRWAY INHALATION TREATMENT: CPT

## 2019-01-24 PROCEDURE — 40000671 ZZH STATISTIC ANESTHESIA CASE

## 2019-01-24 PROCEDURE — 20000003 ZZH R&B ICU

## 2019-01-24 PROCEDURE — 84157 ASSAY OF PROTEIN OTHER: CPT | Performed by: INTERNAL MEDICINE

## 2019-01-24 PROCEDURE — 80069 RENAL FUNCTION PANEL: CPT | Performed by: INTERNAL MEDICINE

## 2019-01-24 PROCEDURE — 99233 SBSQ HOSP IP/OBS HIGH 50: CPT | Performed by: INTERNAL MEDICINE

## 2019-01-24 PROCEDURE — 25000128 H RX IP 250 OP 636: Performed by: STUDENT IN AN ORGANIZED HEALTH CARE EDUCATION/TRAINING PROGRAM

## 2019-01-24 PROCEDURE — 87205 SMEAR GRAM STAIN: CPT | Performed by: NURSE PRACTITIONER

## 2019-01-24 PROCEDURE — 82570 ASSAY OF URINE CREATININE: CPT | Performed by: INTERNAL MEDICINE

## 2019-01-24 PROCEDURE — 25000125 ZZHC RX 250: Performed by: NURSE PRACTITIONER

## 2019-01-24 PROCEDURE — 83615 LACTATE (LD) (LDH) ENZYME: CPT | Performed by: INTERNAL MEDICINE

## 2019-01-24 PROCEDURE — 36556 INSERT NON-TUNNEL CV CATH: CPT | Performed by: INTERNAL MEDICINE

## 2019-01-24 PROCEDURE — 85520 HEPARIN ASSAY: CPT | Performed by: INTERNAL MEDICINE

## 2019-01-24 PROCEDURE — 82945 GLUCOSE OTHER FLUID: CPT | Performed by: INTERNAL MEDICINE

## 2019-01-24 PROCEDURE — 99291 CRITICAL CARE FIRST HOUR: CPT | Performed by: NURSE PRACTITIONER

## 2019-01-24 PROCEDURE — 87086 URINE CULTURE/COLONY COUNT: CPT | Performed by: THORACIC SURGERY (CARDIOTHORACIC VASCULAR SURGERY)

## 2019-01-24 PROCEDURE — 36415 COLL VENOUS BLD VENIPUNCTURE: CPT | Performed by: NURSE PRACTITIONER

## 2019-01-24 PROCEDURE — 36415 COLL VENOUS BLD VENIPUNCTURE: CPT

## 2019-01-24 PROCEDURE — 25000125 ZZHC RX 250: Performed by: RADIOLOGY

## 2019-01-24 PROCEDURE — 25000131 ZZH RX MED GY IP 250 OP 636 PS 637: Performed by: THORACIC SURGERY (CARDIOTHORACIC VASCULAR SURGERY)

## 2019-01-24 PROCEDURE — 84300 ASSAY OF URINE SODIUM: CPT | Performed by: INTERNAL MEDICINE

## 2019-01-24 PROCEDURE — 25000128 H RX IP 250 OP 636

## 2019-01-24 PROCEDURE — 40000986 XR CHEST PORT 1 VW

## 2019-01-24 PROCEDURE — 84145 PROCALCITONIN (PCT): CPT | Performed by: NURSE PRACTITIONER

## 2019-01-24 PROCEDURE — 32555 ASPIRATE PLEURA W/ IMAGING: CPT

## 2019-01-24 PROCEDURE — 94002 VENT MGMT INPAT INIT DAY: CPT

## 2019-01-24 PROCEDURE — 99207 ZZC CDG-MDM COMPONENT: MEETS MODERATE - UP CODED: CPT | Performed by: INTERNAL MEDICINE

## 2019-01-24 PROCEDURE — P9041 ALBUMIN (HUMAN),5%, 50ML: HCPCS | Performed by: SURGERY

## 2019-01-24 PROCEDURE — 94640 AIRWAY INHALATION TREATMENT: CPT | Mod: 76

## 2019-01-24 PROCEDURE — 82805 BLOOD GASES W/O2 SATURATION: CPT | Performed by: INTERNAL MEDICINE

## 2019-01-24 PROCEDURE — 81001 URINALYSIS AUTO W/SCOPE: CPT | Performed by: NURSE PRACTITIONER

## 2019-01-24 PROCEDURE — 85027 COMPLETE CBC AUTOMATED: CPT

## 2019-01-24 PROCEDURE — 00000146 ZZHCL STATISTIC GLUCOSE BY METER IP

## 2019-01-24 PROCEDURE — 85610 PROTHROMBIN TIME: CPT

## 2019-01-24 PROCEDURE — 25000132 ZZH RX MED GY IP 250 OP 250 PS 637: Performed by: THORACIC SURGERY (CARDIOTHORACIC VASCULAR SURGERY)

## 2019-01-24 PROCEDURE — 87205 SMEAR GRAM STAIN: CPT | Performed by: INTERNAL MEDICINE

## 2019-01-24 PROCEDURE — 80048 BASIC METABOLIC PNL TOTAL CA: CPT

## 2019-01-24 PROCEDURE — 25000128 H RX IP 250 OP 636: Performed by: SURGERY

## 2019-01-24 PROCEDURE — 87040 BLOOD CULTURE FOR BACTERIA: CPT | Performed by: NURSE PRACTITIONER

## 2019-01-24 PROCEDURE — 25000132 ZZH RX MED GY IP 250 OP 250 PS 637: Performed by: STUDENT IN AN ORGANIZED HEALTH CARE EDUCATION/TRAINING PROGRAM

## 2019-01-24 PROCEDURE — 25000128 H RX IP 250 OP 636: Performed by: THORACIC SURGERY (CARDIOTHORACIC VASCULAR SURGERY)

## 2019-01-24 RX ORDER — PROPOFOL 10 MG/ML
5-75 INJECTION, EMULSION INTRAVENOUS CONTINUOUS
Status: DISCONTINUED | OUTPATIENT
Start: 2019-01-24 | End: 2019-01-25

## 2019-01-24 RX ORDER — BUMETANIDE 0.25 MG/ML
2 INJECTION INTRAMUSCULAR; INTRAVENOUS ONCE
Status: COMPLETED | OUTPATIENT
Start: 2019-01-24 | End: 2019-01-24

## 2019-01-24 RX ORDER — FUROSEMIDE 10 MG/ML
60 INJECTION INTRAMUSCULAR; INTRAVENOUS ONCE
Status: COMPLETED | OUTPATIENT
Start: 2019-01-24 | End: 2019-01-24

## 2019-01-24 RX ORDER — ALBUMIN, HUMAN INJ 5% 5 %
25 SOLUTION INTRAVENOUS ONCE
Status: COMPLETED | OUTPATIENT
Start: 2019-01-24 | End: 2019-01-24

## 2019-01-24 RX ORDER — PROPOFOL 10 MG/ML
INJECTION, EMULSION INTRAVENOUS PRN
Status: DISCONTINUED | OUTPATIENT
Start: 2019-01-24 | End: 2019-01-24

## 2019-01-24 RX ORDER — NOREPINEPHRINE BITARTRATE/D5W 16MG/250ML
0.03-0.4 PLASTIC BAG, INJECTION (ML) INTRAVENOUS CONTINUOUS
Status: DISCONTINUED | OUTPATIENT
Start: 2019-01-24 | End: 2019-01-28

## 2019-01-24 RX ORDER — LIDOCAINE HYDROCHLORIDE 10 MG/ML
10 INJECTION, SOLUTION EPIDURAL; INFILTRATION; INTRACAUDAL; PERINEURAL ONCE
Status: COMPLETED | OUTPATIENT
Start: 2019-01-24 | End: 2019-01-24

## 2019-01-24 RX ORDER — WARFARIN SODIUM 3 MG/1
3 TABLET ORAL
Status: DISCONTINUED | OUTPATIENT
Start: 2019-01-24 | End: 2019-01-24

## 2019-01-24 RX ADMIN — ALBUMIN HUMAN 25 G: 0.05 INJECTION, SOLUTION INTRAVENOUS at 21:11

## 2019-01-24 RX ADMIN — HYDROMORPHONE HYDROCHLORIDE 0.5 MG: 1 INJECTION, SOLUTION INTRAMUSCULAR; INTRAVENOUS; SUBCUTANEOUS at 09:44

## 2019-01-24 RX ADMIN — IPRATROPIUM BROMIDE AND ALBUTEROL SULFATE 3 ML: .5; 2.5 SOLUTION RESPIRATORY (INHALATION) at 19:55

## 2019-01-24 RX ADMIN — QUETIAPINE 25 MG: 25 TABLET ORAL at 02:07

## 2019-01-24 RX ADMIN — INSULIN ASPART 1 UNITS: 100 INJECTION, SOLUTION INTRAVENOUS; SUBCUTANEOUS at 16:55

## 2019-01-24 RX ADMIN — SODIUM CHLORIDE: 9 INJECTION, SOLUTION INTRAVENOUS at 10:33

## 2019-01-24 RX ADMIN — Medication 5 MG: at 21:07

## 2019-01-24 RX ADMIN — LIDOCAINE HYDROCHLORIDE 10 ML: 10 INJECTION, SOLUTION EPIDURAL; INFILTRATION; INTRACAUDAL; PERINEURAL at 10:00

## 2019-01-24 RX ADMIN — HYDROMORPHONE HYDROCHLORIDE 0.5 MG: 1 INJECTION, SOLUTION INTRAMUSCULAR; INTRAVENOUS; SUBCUTANEOUS at 00:33

## 2019-01-24 RX ADMIN — DEXMEDETOMIDINE 0.2 MCG/KG/HR: 100 INJECTION, SOLUTION, CONCENTRATE INTRAVENOUS at 13:24

## 2019-01-24 RX ADMIN — POTASSIUM CHLORIDE 20 MEQ: 1.5 POWDER, FOR SOLUTION ORAL at 10:17

## 2019-01-24 RX ADMIN — QUETIAPINE 25 MG: 25 TABLET ORAL at 08:12

## 2019-01-24 RX ADMIN — IPRATROPIUM BROMIDE AND ALBUTEROL SULFATE 3 ML: .5; 2.5 SOLUTION RESPIRATORY (INHALATION) at 12:05

## 2019-01-24 RX ADMIN — BUMETANIDE 2 MG: 0.25 INJECTION INTRAMUSCULAR; INTRAVENOUS at 18:40

## 2019-01-24 RX ADMIN — HYDROMORPHONE HYDROCHLORIDE 0.5 MG: 1 INJECTION, SOLUTION INTRAMUSCULAR; INTRAVENOUS; SUBCUTANEOUS at 05:34

## 2019-01-24 RX ADMIN — INSULIN ASPART 1 UNITS: 100 INJECTION, SOLUTION INTRAVENOUS; SUBCUTANEOUS at 13:53

## 2019-01-24 RX ADMIN — INSULIN ASPART 1 UNITS: 100 INJECTION, SOLUTION INTRAVENOUS; SUBCUTANEOUS at 00:45

## 2019-01-24 RX ADMIN — OXYCODONE HYDROCHLORIDE 10 MG: 5 TABLET ORAL at 02:07

## 2019-01-24 RX ADMIN — Medication 25 MCG/HR: at 14:06

## 2019-01-24 RX ADMIN — METOPROLOL TARTRATE 25 MG: 25 TABLET ORAL at 10:17

## 2019-01-24 RX ADMIN — ACETAMINOPHEN 650 MG: 325 TABLET, FILM COATED ORAL at 23:27

## 2019-01-24 RX ADMIN — Medication 40 MG: at 10:17

## 2019-01-24 RX ADMIN — NOREPINEPHRINE BITARTRATE 0.05 MCG/KG/MIN: 1 INJECTION INTRAVENOUS at 12:17

## 2019-01-24 RX ADMIN — NYSTATIN 500000 UNITS: 500000 SUSPENSION ORAL at 05:34

## 2019-01-24 RX ADMIN — ASPIRIN 81 MG 81 MG: 81 TABLET ORAL at 10:17

## 2019-01-24 RX ADMIN — INSULIN ASPART 1 UNITS: 100 INJECTION, SOLUTION INTRAVENOUS; SUBCUTANEOUS at 04:10

## 2019-01-24 RX ADMIN — ACETAMINOPHEN 650 MG: 325 TABLET, FILM COATED ORAL at 01:20

## 2019-01-24 RX ADMIN — NYSTATIN 500000 UNITS: 500000 SUSPENSION ORAL at 23:33

## 2019-01-24 RX ADMIN — PROPOFOL 50 MG: 10 INJECTION, EMULSION INTRAVENOUS at 11:47

## 2019-01-24 RX ADMIN — NYSTATIN 500000 UNITS: 500000 SUSPENSION ORAL at 18:30

## 2019-01-24 RX ADMIN — NYSTATIN 500000 UNITS: 500000 SUSPENSION ORAL at 00:33

## 2019-01-24 RX ADMIN — HEPARIN SODIUM 850 UNITS/HR: 10000 INJECTION, SOLUTION INTRAVENOUS at 14:06

## 2019-01-24 RX ADMIN — PROPOFOL 50 MG: 10 INJECTION, EMULSION INTRAVENOUS at 11:48

## 2019-01-24 RX ADMIN — NOREPINEPHRINE BITARTRATE 0.05 MCG/KG/MIN: 1 INJECTION INTRAVENOUS at 13:13

## 2019-01-24 RX ADMIN — SODIUM PHOSPHATE, MONOBASIC, MONOHYDRATE 10 MMOL: 276; 142 INJECTION, SOLUTION INTRAVENOUS at 02:08

## 2019-01-24 RX ADMIN — FUROSEMIDE 60 MG: 10 INJECTION, SOLUTION INTRAVENOUS at 10:17

## 2019-01-24 RX ADMIN — FUROSEMIDE 5 MG/HR: 10 INJECTION, SOLUTION INTRAMUSCULAR; INTRAVENOUS at 10:27

## 2019-01-24 RX ADMIN — INSULIN ASPART 1 UNITS: 100 INJECTION, SOLUTION INTRAVENOUS; SUBCUTANEOUS at 19:54

## 2019-01-24 RX ADMIN — IPRATROPIUM BROMIDE AND ALBUTEROL SULFATE 3 ML: .5; 2.5 SOLUTION RESPIRATORY (INHALATION) at 15:15

## 2019-01-24 RX ADMIN — NYSTATIN 500000 UNITS: 500000 SUSPENSION ORAL at 14:38

## 2019-01-24 RX ADMIN — QUETIAPINE 25 MG: 25 TABLET ORAL at 21:09

## 2019-01-24 RX ADMIN — IPRATROPIUM BROMIDE AND ALBUTEROL SULFATE 3 ML: .5; 2.5 SOLUTION RESPIRATORY (INHALATION) at 07:34

## 2019-01-24 ASSESSMENT — ACTIVITIES OF DAILY LIVING (ADL)
ADLS_ACUITY_SCORE: 16
ADLS_ACUITY_SCORE: 18
ADLS_ACUITY_SCORE: 16

## 2019-01-24 ASSESSMENT — MIFFLIN-ST. JEOR: SCORE: 1441

## 2019-01-24 NOTE — PROGRESS NOTES
Patient on BIPAP 14/7 30% throughout the shift. Alarm set to 10. Skin intact, gel pad and mepilex applied. Will continue to follow and wean as tolerated.    Maurice Bonilla RT

## 2019-01-24 NOTE — PROGRESS NOTES
Patient with decompensation in respiratory status this morning despite right thoracentesis. Patient reintubated with plans for tracheostomy and PEG placement- anticipate tomorrow 1/25. Patient does have right internal jugular thrombus 2/2 previous central line. Will initiate heparin drip until able to resume coumadin.  Discussed at length with family and they are in agreement.    GRZEGORZ García, CCRN-[facility]  Pager: [phone]

## 2019-01-24 NOTE — PROGRESS NOTES
CLINICAL NUTRITION SERVICES - REASSESSMENT NOTE      Malnutrition: (1/12)  % Weight Loss:  None noted  % Intake:  </= 50% for >/= 5 days (severe malnutrition)  Subcutaneous Fat Loss:  None observed  Muscle Loss:  Temporal region mild depletion and Clavicle bone region mild depletion  Fluid Retention:  Mild - Could be partly nutritional due to prolonged poor nutrition     Malnutrition Diagnosis: Non-Severe malnutrition  In Context of:  Acute illness or injury       EVALUATION OF PROGRESS TOWARD GOALS   Diet:  NPO on vent   Nutrition Support:  Patient's TF was resumed this morning s/p thoracentesis and continues at goal as follows ~    Nutrition Support Enteral:  Type of Feeding Tube: Nasoduodenal   Enteral Frequency:  Continuous  Enteral Regimen: Peptamen 1.5 at 50 mL/hr   Total Enteral Provisions: 1800 kcal (29 kcal/kg), 82 g protein (1.3 g/kg), 924 mL H2O, 226 g CHO, no fiber   Free Water Flush: 100 mL every 4 hours (reduced today)    Intake/Tolerance:    BUN 76 (H), Cr 1.47 (H) - Renal following (IMAN)  -177 with Medium sliding scale insulin   BM x 3 yesterday  I/O 2265/1755, weight up to 96.6 kg (up 12.6 kg from admit), generalized 2+ edema - Noted flushes decreased today by Neph, also on Lasix drip       ASSESSED NUTRITION NEEDS:  Dosing Weight:  62 kg (adjusted for overweight)  Estimated Energy Needs:  8173-9260 kcals (25-30 Kcal/Kg)  Justification: overweight  Estimated Protein Needs:  74-87 grams protein (1.2-1.4 g pro/Kg)  Justification: repletion, post-op and atrophic right kidney       NEW FINDINGS:   Patient s/p thoracentesis today with 400 mL/hr --> had worsening respiratory distress and had to subsequently be re-intubated.  Plan is now for Trach/PEG.  Norepi drip started today.     Previous Goals (1/21):   TF goal Peptamen 1.5 at 50 mL/hr will meet % estimated needs  Evaluation: Met    Previous Nutrition Diagnosis (1/21):   Inadequate enteral nutrition infusion related to low TF rate as  evidenced by TF meeting only 70-80% final needs  Evaluation: Resolved       CURRENT NUTRITION DIAGNOSIS  No nutrition diagnosis identified at this time    INTERVENTIONS  Recommendations / Nutrition Prescription  Continue Peptamen 1.5 at 50 mL/hr as above     Implementation  Collaboration and Referral of Nutrition care:  Patient discussed today during interdisciplinary bedside rounds     Goals  Peptamen 1.5 at 50 mL/hr will continue to meet % needs     MONITORING AND EVALUATION:  Progress towards goals will be monitored and evaluated per protocol and Practice Guidelines    Arleth John RD, LD, CNSC   Clinical Dietitian - [facility]

## 2019-01-24 NOTE — PROGRESS NOTES
Renal Medicine Progress Note             Assessment/Plan:         1.  Non-oliguric IMAN/CKD.  - ATN ( 2/2 AAA, along with renal vascular injury) Creatinine had plateaued and stabilized. Marginally up today with increased dose of diuretics yesterday . Positive fluid balance despite UOP of 2.1 L     2.  S/p aortic dissection/repair.    A.  Per CV Surg.    3.  SOB/hypoxia/pneumonia -breathing appears worse today.  On BiPaP. Volume overloaded ( ~40 lbs up from admission)  and in positive fluid balance yesterday despite increased diuretics and UOP >2 L .   - Decrease Free water flush to 100 q 6h.    - Lasix 60 mg IV this morning, then start on 5 mg./ hr drip . Titrate up to get 1-2 L negative per day  - Check BMP BID while on lasix drip and replace lytes PRN.    - Thoracocentesis planned for today  .     4.  Anemia. Hgb slowly downtrending.  A.  Follow labs.  B.  Transfuse prn.     5.  FEN.   Na is nl.  Decrease free water flush as above.    6. HTN - Bp on lower side.          Interval History:     On BiPaP. Noted to be restless this morning and more confused. Now mildly sedated   Breathing more difficult today . CXR with vascular congestion, pleural effusion.   Decent UOP but was still in positive fluid balance.   On tube feed.   BP 100s/ 60s  S.Cr slightly up with diuresis.     ROS - could not be obtained. Pt on BiPaP and resting.             Medications and Allergies:       aspirin  81 mg Per Feeding Tube Daily     furosemide  60 mg Intravenous Q12H     influenza Vac Split High-Dose  0.5 mL Intramuscular Prior to discharge     insulin aspart  1-6 Units Subcutaneous Q4H     ipratropium - albuterol 0.5 mg/2.5 mg/3 mL  3 mL Nebulization 4x daily     lidocaine (PF)  10 mL Subcutaneous Once     melatonin  5 mg Sublingual At Bedtime     metoprolol tartrate  25 mg Per Feeding Tube BID     nystatin  500,000 Units Oral 4x Daily     pantoprazole  40 mg Oral QAM AC     potassium chloride  20 mEq Oral Daily     QUEtiapine  25 mg  "Oral At Bedtime     sodium chloride (PF)  10 mL Intracatheter Q8H     Allergies   Allergen Reactions     Amoxicillin Swelling     Ciprofloxacin Swelling          Physical Exam:     Vitals were reviewed  Heart Rate: 71, Blood pressure (!) 92/37, pulse 73, temperature 100  F (37.8  C), resp. rate (!) 32, height 1.626 m (5' 4\"), weight 96.6 kg (212 lb 15.4 oz), SpO2 91 %.  Wt Readings from Last 3 Encounters:   01/24/19 96.6 kg (212 lb 15.4 oz)   09/14/08 80.7 kg (178 lb)       GENERAL APPEARANCE: , awake, alert, interactive, on Bipap  HEENT:  Eyes/ears/nose/neck grossly nl x + high flow nc o2  RESP: + few wheezes, rhonchi , diminished breath sounds at bases  CV: RRR c 2/6 m, nl S1/S2, + pacer; sternal incision looks fine  ABDOMEN: o/s/nt/nd  EXTREMITIES/SKIN: 2+ edema  NEURO:  eyes open, moves ext, follows all commands properly  LINES:  + coello c yellow urine; + RIJ 3-lumen         Data:     CBC RESULTS:     Recent Labs   Lab 01/24/19 0455 01/23/19 0415 01/22/19 0435 01/21/19  0430 01/20/19 0400 01/19/19  0430   WBC 13.2* 11.9* 12.0* 14.8* 16.2* 13.0*   RBC 2.54* 2.54* 2.56* 2.72* 2.65* 2.57*   HGB 7.5* 7.6* 7.6* 8.0* 7.8* 7.5*   HCT 23.3* 23.0* 23.1* 24.3* 23.7* 22.6*    336 288 346 322 228     Basic Metabolic Panel:  Recent Labs   Lab 01/24/19 0455 01/23/19 2059 01/23/19 0415 01/22/19  0435 01/21/19  0430 01/20/19  0400 01/19/19  0430     --  139 141 138 136 138   POTASSIUM 4.1 4.3 3.3* 3.4 3.4 3.6 3.9   CHLORIDE 101  --  102 103 101 100 102   CO2 27  --  29 30 30 26 27   BUN 76*  --  74* 71* 72* 74* 73*   CR 1.47*  --  1.30* 1.28* 1.27* 1.56* 1.71*   *  --  140* 133* 112* 120* 96   BESS 8.5  --  8.8 8.4* 8.3* 8.5 8.3*     INR  Recent Labs   Lab 01/24/19  0455 01/23/19  0415 01/22/19  0435 01/21/19  0430   INR 1.57* 3.37* 2.43* 2.16*      Attestation:   I have reviewed today's relevant vital signs, notes, medications, labs and imaging.    Vikash Monroe MD  [facility] Nephrology "   [phone]

## 2019-01-24 NOTE — ADDENDUM NOTE
Addendum  created 01/24/19 1058 by Gabby Graham    Child order released for a procedure order, Intraprocedure Blocks edited, Sign clinical note

## 2019-01-24 NOTE — PROGRESS NOTES
[facility]    Hospitalist Progress Note    Interval History   - Increasing SOB and unable to clear secretions this AM. Underwent thoracentesis (Unclear total fluid removed), did not help. Respiratory status worsening, likely intubate patient    Assessment & Plan   Summary: Priya Funez is a 78 year old female with PMH HTN who was admitted to the CV surgery/ICU service on 1/4/2019 with ascending aortic dissection extending to bilateral iliac arteries, s/p emergent aortic dissection repair with graft. Hospital course complicated by prolonged respiratory failure with failed extubation trials, ultimately successfully extubated on 1/18, a-fib/RVR requiring ablation and pacemaker placement on 1/11, diarrhea initially concerning for microscopic colitis, IMAN, and severe deconditioning. Hospitalist service consulted for continuing medical management on 1/23/2019. Continues to require intermittent BiPap 1/23/2019. Respiratory status worsening 1/24.     Type A aortic dissection s/p repair with graft and repair of bilateral femoral vessels  Cardiogenic shock, resolved  Presented with acute onset back and jaw pain. CTA on arrival showed type A aortic dissection with extensive flap extending from the ascending thoracic aorta all the way to the iliac bifurcation. CV Surgery was notified in the ED, and patient underwent emergent dissection repair and bilateral femoral vessel cutdown/repair by Dr. Winslow, Dr. Mabry, and Dr. Jennings. Bypass time 114 min. Weaned off pressors on 1/5.   - Management as per CV Surgery  - PT/OT, cardiac rehab     Acute hypoxic respiratory failure, multifactorial  Bilateral pleural effusion  Pulmonary vascular congestion  Intubated for surgery, extubated on 1/5, and then re-intubated on 1/12. Potential tracheostomy was discussed, but patient ultimately passed extubation trial on 1/18, and has remained stable.  Respiratory failure has been attributed to pulmonary edema from aggressive  fluid resuscitation perioperatively and decreased reserve from paralyzed right hemidiaphragm. TTE (1/7) showed LVEF 55-60% and indeterminate diastolic function. CXR 1/23 with persistent bilateral pleural effusions R > L, pulmonary vascular congestion. Underwent right thoracentesis which showed transudative effusion on 1/24, did not significantly improve respiratory status--likely pulmonary edema primary .  - Plan for intubation later today, defer to intensivists  - Nephrology managing diuresis   - Lasix drip  - Continues on duonebs QID     IMAN, Improved: Creatinine up to 1.99 from baseline ~0.8, due to ATN from decreased perfusion - dissection involving right renal artery and acute blood loss from surgery. Renogram (1/3) showed decreased renal function bilaterally (estimated 34% of the right kidney, 66% function of the left).  - Nephrology following, appreciate recs   - Creatinine 1.47 today   - Diuresing per nephrology     Dysphagia due to prolonged extubation and severe deconditioning  Severe malnutrition due to acute illness  - Remains NPO per SLP  - On tube feeds as per Nutrition  - SLP and Nutrition following, appreciate assistance    Atrial fibrillation with RVR s/p AVN ablation and pacemaker 1/11: Developed persistent a-fib/RVR following surgery, and failed attempt at medical management. She ultimately underwent AV node ablation with uneventful pacemaker placement by Cardiology on 1/11. She has been since been maintained on metoprolol and warfarin for CHADS2-VASc score 3. Cardiology has signed off. Follow up in device clinic.   - Continue metoprolol  - Resume warfarin    Chronic/Stable  Acute blood loss anemia due to surgery: Baseline Hgb 11-13. EBL 1.2L. She has received 2 units prbcs since her surgery.   - Hgb has been stable in the mid 7 range    Essential hypertension: SBP 110s-140s. Holding PTA hydrochlorothiazide. Continue metoprolol.  ICU delirium, improved   - Decrease seroquel to at  bedtime  Diarrhea, Improving: Developed persistent diarrhea post-op. C.diff PCR negative. GI was consulted - there was some concern for microscopic colitis, but biopsies returned negative. Her diarrhea has since been improving.     DVT Prophylaxis: Warfarin  Code Status: Full Code  PT/OT: Ordered    Disposition: Expected discharge pending improvement in respiratory status    Jonas De La Rosa MD  Text Page  (7am to 6pm)  -Data reviewed today: I reviewed all new labs and imaging results over the last 24 hours.    Physical Exam   Temp: 100.8  F (38.2  C) Temp src: Bladder BP: 106/52 Pulse: 69 Heart Rate: 71 Resp: 18 SpO2: 95 % O2 Device: High Flow Nasal Cannula (HFNC) Oxygen Delivery: Other (Comments)(40 lpm)  Vitals:    01/22/19 0600 01/23/19 0400 01/24/19 0534   Weight: 97.8 kg (215 lb 9.8 oz) 96.6 kg (213 lb) 96.6 kg (212 lb 15.4 oz)     Vital Signs with Ranges  Temp:  [99  F (37.2  C)-101.1  F (38.4  C)] 100.8  F (38.2  C)  Pulse:  [69-76] 69  Heart Rate:  [69-73] 71  Resp:  [13-44] 18  BP: ()/() 106/52  FiO2 (%):  [30 %-50 %] 50 %  SpO2:  [91 %-100 %] 95 %  I/O last 3 completed shifts:  In: 2265 [I.V.:10; NG/GT:1080]  Out: 1755 [Urine:1755]  O2 requirements: HFNC    Constitutional: Female in respiratory distress, unable to control secretions  Cardiovascular: Tachycardic  Respiratory: Coarse  Vascular: 2-3+ BLE pitting edema to thighs  GI: nontender  Neuro/Psych: In respiratory distress, tracks with eyes    Medications     IV fluid REPLACEMENT ONLY       furosemide (LASIX) infusion ADULT STANDARD 5 mg/hr (01/24/19 1027)     - MEDICATION INSTRUCTIONS -       - MEDICATION INSTRUCTIONS -       sodium chloride 10 mL/hr at 01/24/19 1033       aspirin  81 mg Per Feeding Tube Daily     influenza Vac Split High-Dose  0.5 mL Intramuscular Prior to discharge     insulin aspart  1-6 Units Subcutaneous Q4H     ipratropium - albuterol 0.5 mg/2.5 mg/3 mL  3 mL Nebulization 4x daily     lidocaine (PF)  10 mL  Subcutaneous Once     melatonin  5 mg Sublingual At Bedtime     metoprolol tartrate  25 mg Per Feeding Tube BID     nystatin  500,000 Units Oral 4x Daily     pantoprazole  40 mg Oral QAM AC     potassium chloride  20 mEq Oral Daily     QUEtiapine  25 mg Oral At Bedtime     sodium chloride (PF)  10 mL Intracatheter Q8H       Data   Recent Labs   Lab 01/24/19  0455 01/23/19 2059 01/23/19 0415 01/22/19  0435 01/21/19 0430   WBC 13.2*  --  11.9* 12.0* 14.8*   HGB 7.5*  --  7.6* 7.6* 8.0*   MCV 92  --  91 90 89     --  336 288 346   INR 1.57*  --  3.37* 2.43* 2.16*     --  139 141 138   POTASSIUM 4.1 4.3 3.3* 3.4 3.4   CHLORIDE 101  --  102 103 101   CO2 27  --  29 30 30   BUN 76*  --  74* 71* 72*   CR 1.47*  --  1.30* 1.28* 1.27*   ANIONGAP 10  --  8 8 7   BESS 8.5  --  8.8 8.4* 8.3*   *  --  140* 133* 112*   ALBUMIN  --   --   --   --  3.6   PROTTOTAL  --   --  6.2*  --  5.8*   BILITOTAL  --   --   --   --  1.0   ALKPHOS  --   --   --   --  73   ALT  --   --   --   --  22   AST  --   --   --   --  15       Imaging:   Recent Results (from the past 24 hour(s))   US Thoracentesis    Narrative    PROCEDURE:   Ultrasound-guided thoracentesis    DATE OF PROCEDURE:   1/24/2019 10:28 AM    OPERATORS:    Francesco Mckeon MD    COMPLICATIONS:   None     SPECIMENS TO LAB:   Right pleural fluid    MEDICATION(S) GIVEN:   1% Lidocaine SQ     CONTRAST:   None    Referenced air kerma: 0 mGy  Fluoroscopy time: 0 minutes    ESTIMATED BLOOD LOSS:  Minimal    Pre-procedure diagnosis: Pleural effusion  Post-procedure diagnosis: Same    CLINICAL HISTORY/INDICATION:   78-year-old female with symptomatic right pleural effusion presents  for bedside/portable ultrasound-guided thoracentesis.    PROCEDURE AND FINDINGS:   Following a discussion of the risks, benefits, indications and  alternatives to treatment, appropriate informed consent was obtained.  The patient was seen at the bedside in the intensive care in and  placed  left posterior oblique. The right posterior chest was prepped  and draped in a sterile fashion. A time out was performed per hospital  universal protocol policy to ensure correct patient, site and  procedure to be performed.     Preliminary ultrasonography of the right  chest was performed and  demonstrates a moderate pleural effusion.  An ultrasound image was  archived. Local anesthesia was achieved with 1% lidocaine. A one-step  catheter was advanced into the pleural space under direct ultrasound  guidance with visualization of needle tip entry into the pleural  space. A total of 400 mL of fluid was then removed. The catheter was  removed uneventfully, and a sterile dressing applied.     Throughout the procedure, the patient was monitored by a ICU nurse for  heart rate, blood pressure and oxygen saturation which remained  stable. The patient tolerated the procedure well and left  interventional radiology in stable condition.       Impression    IMPRESSION:   Ultrasound-guided bedside thoracentesis, as detailed above.    JUNG BARRON MD

## 2019-01-24 NOTE — PROVIDER NOTIFICATION
Maggy Álvarez NP notified of critical ABG, PO2 33. RT notified as well. Verifying that blood was arterial versus venous. Maggy shortly at bedside. Pt stable on BiPap. Will cont to monitor.

## 2019-01-24 NOTE — PROCEDURES
Procedure: Central Line Placement  Consent:   2. Could not be obtained from patient and next of kin and procedure felt to be urgent and could not posptoned any further to get consent Yes     Universal Protocol:   Patient Identification was verified, time out was performed, site marking N/A, Imaging data N/A. Full Barrier precaution done: Hands washed, mask, gloves, gown, cap and eye protection all used.    Indication: IV access for medication(s) and CVP monitoring.    Narrative: The vein was identified with portable ultrasound device (compression test, venous color and doppler flow pattern). Area prepped with chlorhexedine and Patient was head to toe draped. Sterile technique and full barrier precautions used. 1% lidocaine injected subcutaneously for local anesthesia. A triple lumen catheter was inserted using standard Seldinger technique under real time US guidance after careful evaluation of the best side to minimize risk of infection and complication related to insertion. The central line was sutured at 18 cm depth.    - Of note, initially tried to put central line in the right internal jugular. Unable to pass wire and on further examination with ultrasound noted a clot. Changed site to left internal jugular.     Post-procedure imaging:  Central line is in good position and no visible pneumothorax per my review.     Complications: No apparent complication.    Procedure performed by Dr Yu on January 24, 2019

## 2019-01-24 NOTE — PROGRESS NOTES
[facility]  Cardiovascular and Thoracic Surgery Daily Note          Assessment and Plan:   POD#20 s/p Left groin cutdown with exposure of the femoral vessels with arterial cannulation for cardiopulmonary bypass. Right groin cutdown with exposure of the femoral vessels with the right femoral artery ultimately used for diagnostic angiography. 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 Visceral angiography. Repair of the bilateral femoral vessels by Dr. Jose Winslow.    Main Plans for today:  1. Continue HFNC and Bipap with rest/ pulm hygiene  2. Right sided US guided thoracentesis (done)  3. Work with therapies  4. Diuresis at direction of nephrology    CVS:   SBP 95-125s, HR 80/ 100% VVI paced.  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 On oral amiodarone and coumadin, INR 2.43 today.  Pulm:  Extubated per protocol and reintubated on POD #8 for resp distress.  CT chest on 1/14 demonstrates RLL compressive atelectasis 2/2 elevated hemidiaphragm (present preoperatively). CXR/CT chest with fluid in fissures.   POD #7 left US guided thoracentesis with removal of 150 mL fluid.   POD #20 right US guided thoracentesis with removal of 400 mL of fluid.  Extubated on POD #14 and has been tolerating Hi Flow NC 35LPM/45% and bipap with rest.  She continues to be bipap dependent; worsening this am despite right thoracentesis.  Repeat CXR demonstrates persistent fluid overload. Diuresing well with assistance of nephrology - lasix drip today.  Dysphagia 2/2 deconditioning - currently strict NPO as high aspiration risk.  Neuro:  Grossly intact, weakly follows commands. Intermittently confused and anxious. Mixed delirium. On seroquel.  Pain well managed with current regimen.  Deconditioned - working with PT/OT/speech  Renal:  IMAN -BL Creat ~0.8, 1.47  today; Preop weight 77.1 kg, today 96.6 kg, no change overnight.   Renal US on  with patent flow in bilateral renal arteries. Right kidney atrophic.  Renal on board - appreciate. Appreciate. Lasix drip today with BID BMPs  Fluid overloaded with significant edema.  BMP BID  GI:  Malnourished. Receiving tube feedings via NJ feeding tube.  Five day course of oral prednisone completed for suspected microscopic colitis, flex sig biopsy negative. Loose stools improving.  CT abdomen on  did not demonstrate acute pathology.  Dysphagia 2/2 deconditioning; Speech consulted, strict NPO for now   and  negative for c. Diff  On PPI  :  Barrientos discontinued on ; external incontinence  in place  Endo:   Preop A1C 5.3; managed on sliding scale insulin. Goal BG <180  FEN:   Replace lytes as needed; Continue tube feedings via postpyloric feeding tube  Speech consult - appreciate  ID:   Temp (24hrs), Av.4  F (38  C), Min:99  F (37.2  C), Max:101.1  F (38.4  C)  Stress induced leukocytosis. WBC 13.2.   No further antibiotics.  Sputum + candida,on nystatin; BC neg.  Heme:   Acute blood loss anemia and thrombocytopenia: Hgb 7.5; Plt 375  Tubes/Drains:  Feeding tube  Proph:   BB, statin, ASA, coumadin, PCD, PPI  Dispo:   Continue in ICU with close monitoring  Appreciate consulting services          Interval History:   Lying in bed on bipap, RR mid 30s. Oriented to self, year. No acute events overnight. Per daughter, more anxious.         Medications:       aspirin  81 mg Per Feeding Tube Daily     influenza Vac Split High-Dose  0.5 mL Intramuscular Prior to discharge     insulin aspart  1-6 Units Subcutaneous Q4H     ipratropium - albuterol 0.5 mg/2.5 mg/3 mL  3 mL Nebulization 4x daily     lidocaine (PF)  10 mL Subcutaneous Once     melatonin  5 mg Sublingual At Bedtime     metoprolol tartrate  25 mg Per Feeding Tube BID     nystatin  500,000 Units Oral 4x Daily     pantoprazole  40 mg Oral QAM AC      "potassium chloride  20 mEq Oral Daily     QUEtiapine  25 mg Oral At Bedtime     sodium chloride (PF)  10 mL Intracatheter Q8H     acetaminophen, albuterol, IV fluid REPLACEMENT ONLY, glucose **OR** dextrose **OR** glucagon, heparin lock flush, hydrALAZINE, HYDROmorphone, lidocaine 4%, lidocaine 4%, lidocaine (buffered or not buffered), lidocaine (buffered or not buffered), magnesium sulfate, magnesium sulfate, - MEDICATION INSTRUCTIONS -, - MEDICATION INSTRUCTIONS -, methocarbamol, naloxone, ondansetron **OR** ondansetron, oxyCODONE IR, potassium chloride, potassium chloride with lidocaine, potassium chloride, potassium chloride, potassium chloride, QUEtiapine, sodium chloride (PF), sodium chloride (PF), sodium chloride (PF), [COMPLETED] sodium phosphate **FOLLOWED BY** sodium phosphate, sodium phosphate, sodium phosphate, sodium phosphate, sodium phosphate          Physical Exam:   Vitals were reviewed  Blood pressure (!) 92/37, pulse 73, temperature 100  F (37.8  C), resp. rate (!) 32, height 1.626 m (5' 4\"), weight 96.6 kg (212 lb 15.4 oz), SpO2 91 %.  Rhythm: VVI paced at 80 on bedside monitor    Lungs: Diminshed throughout - on Bipap    Cardiovascular: Distant, S1, S2, RRR, no m/r/g.     Abdomen: Distended/NT. + BS. NJ in place, incontinent of stool    Extremeties: 2-3+ edema with 1+ palp pedal pulses    Incision(s): Sternal, left chest, and right femoral incisions CDI    CT: N/A    Weight:   Vitals:    01/20/19 0600 01/21/19 0600 01/22/19 0600 01/23/19 0400   Weight: 100.3 kg (221 lb 1.9 oz) 99.2 kg (218 lb 11.1 oz) 97.8 kg (215 lb 9.8 oz) 96.6 kg (213 lb)    01/24/19 0534   Weight: 96.6 kg (212 lb 15.4 oz)            Data:    All labs and imaging reviewed by me.  Labs:   Lab Results   Component Value Date    WBC 12.5 01/18/2019     Lab Results   Component Value Date    RBC 2.48 01/18/2019     Lab Results   Component Value Date    HGB 7.2 01/18/2019     Lab Results   Component Value Date    HCT 21.9 01/18/2019 "     No components found for: MCT  Lab Results   Component Value Date    MCV 88 01/18/2019     Lab Results   Component Value Date    MCH 29.0 01/18/2019     Lab Results   Component Value Date    MCHC 32.9 01/18/2019     Lab Results   Component Value Date    RDW 18.1 01/18/2019     Lab Results   Component Value Date    PLT Canceled, Test credited 01/18/2019     01/18/2019       Last Basic Metabolic Panel:  Lab Results   Component Value Date     01/18/2019      Lab Results   Component Value Date    POTASSIUM 4.4 01/18/2019     Lab Results   Component Value Date    CHLORIDE 105 01/18/2019     Lab Results   Component Value Date    BESS 8.1 01/18/2019     Lab Results   Component Value Date    CO2 24 01/18/2019     Lab Results   Component Value Date    BUN 69 01/18/2019     Lab Results   Component Value Date    CR 1.73 01/18/2019     Lab Results   Component Value Date     01/18/2019       GRZEGORZ García, CCRN-CSC  CV Surgery  [facility] Pager: [phone]  Personal Pager: [phone]

## 2019-01-24 NOTE — PLAN OF CARE
Pt disoriented to time. On BiPap until 1800 switched to HFNC 50% 40LPM, sats >94%. LS coarse. Non productive cough. TF@goal. Barrientos with good UO. BM x2. Plan for US thoracentesis tomorrow, INR 3.37-given vit K+. Family at bedside and updated on POC. Will cont to monitor.

## 2019-01-24 NOTE — PLAN OF CARE
PT-Attempted to see. Nursing requested PT check back in p.m. If time  allows. Reports patient had a rough night and currently requiring more oxygen on Bipap.

## 2019-01-24 NOTE — PLAN OF CARE
Follows command but disoriented to time.Bipap 30% all night. very tachypneic in 30's. Coarse LS.  Given dilaudid/Seroquel/melatonin to help with pain/delirium and promote sleep. BP stable. Vpaced. Good UOP wth lasix. Generalized +2 edema. 1 moderate BM. Phos replaced. INR 1.57. TF held for candelario holland later in am. Will monitor.

## 2019-01-24 NOTE — ANESTHESIA PROCEDURE NOTES
ARTERIAL LINE PROCEDURE NOTE:   Pre-Procedure  Performed by Gabby Graham  Location: pre-op      Pre-Anesthestic Checklist: patient identified, IV checked, risks and benefits discussed and informed consent    Timeout  Correct Patient: Yes   Correct Procedure: Yes   Correct Site: Yes   Correct Laterality: N/A   Correct Position: Yes   Site Marked: N/A   .   Procedure Documentation  Procedure: arterial line    Supine  Insertion Site:right.Skin infiltrated with mL of 1% lidocaine. Injection technique: Seldinger Technique  .  .  Patient Prep;chlorhexidine gluconate and isopropyl alcohol, patient draped    Assessment/Narrative    Catheter: 20 gauge, 12 cm     Secured by suture  Tegaderm dressing used.    Arterial waveform: Yes     Comments:  Arterial Line  No complications    Two attempts by myself and er physician, no complications

## 2019-01-24 NOTE — PHARMACY-ANTICOAGULATION SERVICE
Clinical Pharmacy - Warfarin Dosing Consult     Pharmacy has been consulted to manage this patient s warfarin therapy.  Indication: Atrial Fibrillation  Therapy Goal: INR 2-3  Warfarin Prior to Admission: No  Recent documented change in oral intake/nutrition: Unknown    INR   Date Value Ref Range Status   01/24/2019 1.57 (H) 0.86 - 1.14 Final   01/23/2019 3.37 (H) 0.86 - 1.14 Final       Recommend warfarin 3 mg today.  Pharmacy will monitor Priya Funez daily and order warfarin doses to achieve specified goal.      Please contact pharmacy as soon as possible if the warfarin needs to be held for a procedure or if the warfarin goals change.    Araceli Briscoe, PharmD, BCPS

## 2019-01-24 NOTE — PLAN OF CARE
SLP: Attempted to see patient for treatment, but is now intubated and per [facility] notes will be having an tracheotomy placed 1/25/19.

## 2019-01-24 NOTE — ANESTHESIA CARE TRANSFER NOTE
Patient: Priya Funez    * No procedures listed *    Diagnosis: * No pre-op diagnosis entered *  Diagnosis Additional Information: No value filed.    Anesthesia Type:   No value filed.     Note:  Airway :ETT  Patient transferred to:ICU  Comments: Diagnosis: respiratory failure  Procedure: Intubation  Cardiologist: Jonas Ponce  Location: [address] [place] [address] Handoff: Call for PAUSE to initiate/utilize ICU HANDOFF, Identified Patient, Identified Responsible Provider, Reviewed the Pertinent Medical History, Discussed Surgical Course, Reviewed Intra-OP Anesthesia Management and Issues during Anesthesia, Set Expectations for Post Procedure Period and Allowed Opportunity for Questions and Acknowledgement of Understanding      Vitals: (Last set prior to Anesthesia Care Transfer)              Electronically Signed By: TAMRA Costello CRNA  January 24, 2019  11:55 PM

## 2019-01-25 ENCOUNTER — ANESTHESIA EVENT (OUTPATIENT)
Dept: SURGERY | Facility: CLINIC | Age: 79
DRG: 003 | End: 2019-01-25
Payer: COMMERCIAL

## 2019-01-25 ENCOUNTER — APPOINTMENT (OUTPATIENT)
Dept: GENERAL RADIOLOGY | Facility: CLINIC | Age: 79
DRG: 003 | End: 2019-01-25
Payer: COMMERCIAL

## 2019-01-25 ENCOUNTER — ANESTHESIA (OUTPATIENT)
Dept: SURGERY | Facility: CLINIC | Age: 79
DRG: 003 | End: 2019-01-25
Payer: COMMERCIAL

## 2019-01-25 ENCOUNTER — APPOINTMENT (OUTPATIENT)
Dept: SURGERY | Facility: PHYSICIAN GROUP | Age: 79
End: 2019-01-25
Payer: COMMERCIAL

## 2019-01-25 LAB
ABO + RH BLD: NORMAL
ABO + RH BLD: NORMAL
ANION GAP SERPL CALCULATED.3IONS-SCNC: 9 MMOL/L (ref 3–14)
BLD GP AB SCN SERPL QL: NORMAL
BLD PROD TYP BPU: NORMAL
BLD PROD TYP BPU: NORMAL
BLD UNIT ID BPU: 0
BLOOD BANK CMNT PATIENT-IMP: NORMAL
BLOOD PRODUCT CODE: NORMAL
BPU ID: NORMAL
BUN SERPL-MCNC: 90 MG/DL (ref 7–30)
CALCIUM SERPL-MCNC: 7.8 MG/DL (ref 8.5–10.1)
CHLORIDE SERPL-SCNC: 101 MMOL/L (ref 94–109)
CO2 SERPL-SCNC: 27 MMOL/L (ref 20–32)
CREAT SERPL-MCNC: 1.9 MG/DL (ref 0.52–1.04)
ERYTHROCYTE [DISTWIDTH] IN BLOOD BY AUTOMATED COUNT: 18 % (ref 10–15)
GFR SERPL CREATININE-BSD FRML MDRD: 25 ML/MIN/{1.73_M2}
GLUCOSE BLDC GLUCOMTR-MCNC: 114 MG/DL (ref 70–99)
GLUCOSE BLDC GLUCOMTR-MCNC: 115 MG/DL (ref 70–99)
GLUCOSE BLDC GLUCOMTR-MCNC: 122 MG/DL (ref 70–99)
GLUCOSE BLDC GLUCOMTR-MCNC: 130 MG/DL (ref 70–99)
GLUCOSE SERPL-MCNC: 112 MG/DL (ref 70–99)
HCT VFR BLD AUTO: 20.4 % (ref 35–47)
HGB BLD-MCNC: 6.6 G/DL (ref 11.7–15.7)
INR PPP: 1.72 (ref 0.86–1.14)
LMWH PPP CHRO-ACNC: <0.1 IU/ML
MAGNESIUM SERPL-MCNC: 2.3 MG/DL (ref 1.6–2.3)
MCH RBC QN AUTO: 29.1 PG (ref 26.5–33)
MCHC RBC AUTO-ENTMCNC: 32.4 G/DL (ref 31.5–36.5)
MCV RBC AUTO: 90 FL (ref 78–100)
NUM BPU REQUESTED: 1
PLATELET # BLD AUTO: 425 10E9/L (ref 150–450)
POTASSIUM SERPL-SCNC: 4.6 MMOL/L (ref 3.4–5.3)
RBC # BLD AUTO: 2.27 10E12/L (ref 3.8–5.2)
SODIUM SERPL-SCNC: 137 MMOL/L (ref 133–144)
SPECIMEN EXP DATE BLD: NORMAL
TRANSFUSION STATUS PATIENT QL: NORMAL
TRANSFUSION STATUS PATIENT QL: NORMAL
WBC # BLD AUTO: 12.9 10E9/L (ref 4–11)

## 2019-01-25 PROCEDURE — 94640 AIRWAY INHALATION TREATMENT: CPT | Mod: 76

## 2019-01-25 PROCEDURE — 25000128 H RX IP 250 OP 636: Performed by: SURGERY

## 2019-01-25 PROCEDURE — 36000052 ZZH SURGERY LEVEL 2 EA 15 ADDTL MIN: Performed by: THORACIC SURGERY (CARDIOTHORACIC VASCULAR SURGERY)

## 2019-01-25 PROCEDURE — 86900 BLOOD TYPING SEROLOGIC ABO: CPT | Performed by: THORACIC SURGERY (CARDIOTHORACIC VASCULAR SURGERY)

## 2019-01-25 PROCEDURE — 25000128 H RX IP 250 OP 636: Performed by: NURSE ANESTHETIST, CERTIFIED REGISTERED

## 2019-01-25 PROCEDURE — 36000050 ZZH SURGERY LEVEL 2 1ST 30 MIN: Performed by: THORACIC SURGERY (CARDIOTHORACIC VASCULAR SURGERY)

## 2019-01-25 PROCEDURE — 37000008 ZZH ANESTHESIA TECHNICAL FEE, 1ST 30 MIN: Performed by: THORACIC SURGERY (CARDIOTHORACIC VASCULAR SURGERY)

## 2019-01-25 PROCEDURE — 25000132 ZZH RX MED GY IP 250 OP 250 PS 637: Performed by: INTERNAL MEDICINE

## 2019-01-25 PROCEDURE — 0B110F4 BYPASS TRACHEA TO CUTANEOUS WITH TRACHEOSTOMY DEVICE, OPEN APPROACH: ICD-10-PCS | Performed by: THORACIC SURGERY (CARDIOTHORACIC VASCULAR SURGERY)

## 2019-01-25 PROCEDURE — 25000128 H RX IP 250 OP 636: Performed by: INTERNAL MEDICINE

## 2019-01-25 PROCEDURE — 37000009 ZZH ANESTHESIA TECHNICAL FEE, EACH ADDTL 15 MIN: Performed by: THORACIC SURGERY (CARDIOTHORACIC VASCULAR SURGERY)

## 2019-01-25 PROCEDURE — 85027 COMPLETE CBC AUTOMATED: CPT | Performed by: SURGERY

## 2019-01-25 PROCEDURE — 27210437 ZZH NUTRITION PRODUCT SEMIELEM INTERMED LITER

## 2019-01-25 PROCEDURE — 86923 COMPATIBILITY TEST ELECTRIC: CPT | Performed by: THORACIC SURGERY (CARDIOTHORACIC VASCULAR SURGERY)

## 2019-01-25 PROCEDURE — 94003 VENT MGMT INPAT SUBQ DAY: CPT

## 2019-01-25 PROCEDURE — 86850 RBC ANTIBODY SCREEN: CPT | Performed by: THORACIC SURGERY (CARDIOTHORACIC VASCULAR SURGERY)

## 2019-01-25 PROCEDURE — 40000008 ZZH STATISTIC AIRWAY CARE

## 2019-01-25 PROCEDURE — P9016 RBC LEUKOCYTES REDUCED: HCPCS | Performed by: THORACIC SURGERY (CARDIOTHORACIC VASCULAR SURGERY)

## 2019-01-25 PROCEDURE — 25000125 ZZHC RX 250: Performed by: NURSE PRACTITIONER

## 2019-01-25 PROCEDURE — 25000132 ZZH RX MED GY IP 250 OP 250 PS 637: Performed by: NURSE PRACTITIONER

## 2019-01-25 PROCEDURE — 86901 BLOOD TYPING SEROLOGIC RH(D): CPT | Performed by: THORACIC SURGERY (CARDIOTHORACIC VASCULAR SURGERY)

## 2019-01-25 PROCEDURE — 00000146 ZZHCL STATISTIC GLUCOSE BY METER IP

## 2019-01-25 PROCEDURE — 99291 CRITICAL CARE FIRST HOUR: CPT | Performed by: NURSE PRACTITIONER

## 2019-01-25 PROCEDURE — 25000125 ZZHC RX 250: Performed by: NURSE ANESTHETIST, CERTIFIED REGISTERED

## 2019-01-25 PROCEDURE — 85610 PROTHROMBIN TIME: CPT | Performed by: SURGERY

## 2019-01-25 PROCEDURE — 25000128 H RX IP 250 OP 636: Performed by: PHYSICIAN ASSISTANT

## 2019-01-25 PROCEDURE — 25000566 ZZH SEVOFLURANE, EA 15 MIN: Performed by: THORACIC SURGERY (CARDIOTHORACIC VASCULAR SURGERY)

## 2019-01-25 PROCEDURE — 85520 HEPARIN ASSAY: CPT | Performed by: SURGERY

## 2019-01-25 PROCEDURE — 27210794 ZZH OR GENERAL SUPPLY STERILE: Performed by: THORACIC SURGERY (CARDIOTHORACIC VASCULAR SURGERY)

## 2019-01-25 PROCEDURE — 25000132 ZZH RX MED GY IP 250 OP 250 PS 637: Performed by: THORACIC SURGERY (CARDIOTHORACIC VASCULAR SURGERY)

## 2019-01-25 PROCEDURE — 80048 BASIC METABOLIC PNL TOTAL CA: CPT | Performed by: SURGERY

## 2019-01-25 PROCEDURE — 20000003 ZZH R&B ICU

## 2019-01-25 PROCEDURE — 83735 ASSAY OF MAGNESIUM: CPT | Performed by: SURGERY

## 2019-01-25 PROCEDURE — 25000128 H RX IP 250 OP 636: Performed by: NURSE PRACTITIONER

## 2019-01-25 PROCEDURE — 94640 AIRWAY INHALATION TREATMENT: CPT

## 2019-01-25 PROCEDURE — 40000275 ZZH STATISTIC RCP TIME EA 10 MIN

## 2019-01-25 PROCEDURE — 25000125 ZZHC RX 250: Performed by: THORACIC SURGERY (CARDIOTHORACIC VASCULAR SURGERY)

## 2019-01-25 PROCEDURE — 71045 X-RAY EXAM CHEST 1 VIEW: CPT

## 2019-01-25 PROCEDURE — 43246 EGD PLACE GASTROSTOMY TUBE: CPT | Performed by: SURGERY

## 2019-01-25 PROCEDURE — 0DH63UZ INSERTION OF FEEDING DEVICE INTO STOMACH, PERCUTANEOUS APPROACH: ICD-10-PCS | Performed by: SURGERY

## 2019-01-25 RX ORDER — FENTANYL CITRATE 50 UG/ML
INJECTION, SOLUTION INTRAMUSCULAR; INTRAVENOUS PRN
Status: DISCONTINUED | OUTPATIENT
Start: 2019-01-25 | End: 2019-01-25

## 2019-01-25 RX ORDER — SODIUM CHLORIDE, SODIUM LACTATE, POTASSIUM CHLORIDE, CALCIUM CHLORIDE 600; 310; 30; 20 MG/100ML; MG/100ML; MG/100ML; MG/100ML
INJECTION, SOLUTION INTRAVENOUS CONTINUOUS PRN
Status: DISCONTINUED | OUTPATIENT
Start: 2019-01-25 | End: 2019-01-25

## 2019-01-25 RX ORDER — ACETAMINOPHEN 160 MG
TABLET,DISINTEGRATING ORAL
Status: DISCONTINUED | OUTPATIENT
Start: 2019-01-25 | End: 2019-01-29 | Stop reason: HOSPADM

## 2019-01-25 RX ORDER — CEFTRIAXONE 1 G/1
1 INJECTION, POWDER, FOR SOLUTION INTRAMUSCULAR; INTRAVENOUS EVERY 24 HOURS
Status: DISCONTINUED | OUTPATIENT
Start: 2019-01-25 | End: 2019-01-28

## 2019-01-25 RX ORDER — NEOSTIGMINE METHYLSULFATE 1 MG/ML
VIAL (ML) INJECTION PRN
Status: DISCONTINUED | OUTPATIENT
Start: 2019-01-25 | End: 2019-01-25

## 2019-01-25 RX ORDER — MAGNESIUM HYDROXIDE 1200 MG/15ML
LIQUID ORAL PRN
Status: DISCONTINUED | OUTPATIENT
Start: 2019-01-25 | End: 2019-01-25 | Stop reason: HOSPADM

## 2019-01-25 RX ORDER — GLYCOPYRROLATE 0.2 MG/ML
INJECTION, SOLUTION INTRAMUSCULAR; INTRAVENOUS PRN
Status: DISCONTINUED | OUTPATIENT
Start: 2019-01-25 | End: 2019-01-25

## 2019-01-25 RX ADMIN — PHENYLEPHRINE HYDROCHLORIDE 100 MCG: 10 INJECTION, SOLUTION INTRAMUSCULAR; INTRAVENOUS; SUBCUTANEOUS at 14:19

## 2019-01-25 RX ADMIN — POTASSIUM CHLORIDE 20 MEQ: 1.5 POWDER, FOR SOLUTION ORAL at 09:00

## 2019-01-25 RX ADMIN — NYSTATIN 500000 UNITS: 500000 SUSPENSION ORAL at 05:22

## 2019-01-25 RX ADMIN — SODIUM CHLORIDE, POTASSIUM CHLORIDE, SODIUM LACTATE AND CALCIUM CHLORIDE: 600; 310; 30; 20 INJECTION, SOLUTION INTRAVENOUS at 13:45

## 2019-01-25 RX ADMIN — HEPARIN SODIUM 1250 UNITS/HR: 10000 INJECTION, SOLUTION INTRAVENOUS at 20:24

## 2019-01-25 RX ADMIN — CEFTRIAXONE SODIUM 1 G: 1 INJECTION, POWDER, FOR SOLUTION INTRAMUSCULAR; INTRAVENOUS at 04:58

## 2019-01-25 RX ADMIN — PHENYLEPHRINE HYDROCHLORIDE 100 MCG: 10 INJECTION, SOLUTION INTRAMUSCULAR; INTRAVENOUS; SUBCUTANEOUS at 14:25

## 2019-01-25 RX ADMIN — IPRATROPIUM BROMIDE AND ALBUTEROL SULFATE 3 ML: .5; 2.5 SOLUTION RESPIRATORY (INHALATION) at 10:24

## 2019-01-25 RX ADMIN — PHENYLEPHRINE HYDROCHLORIDE 200 MCG: 10 INJECTION, SOLUTION INTRAMUSCULAR; INTRAVENOUS; SUBCUTANEOUS at 14:29

## 2019-01-25 RX ADMIN — PHENYLEPHRINE HYDROCHLORIDE 200 MCG: 10 INJECTION, SOLUTION INTRAMUSCULAR; INTRAVENOUS; SUBCUTANEOUS at 14:22

## 2019-01-25 RX ADMIN — ROCURONIUM BROMIDE 10 MG: 10 INJECTION INTRAVENOUS at 13:52

## 2019-01-25 RX ADMIN — DEXMEDETOMIDINE 0.2 MCG/KG/HR: 100 INJECTION, SOLUTION, CONCENTRATE INTRAVENOUS at 07:05

## 2019-01-25 RX ADMIN — FUROSEMIDE 5 MG/HR: 10 INJECTION, SOLUTION INTRAMUSCULAR; INTRAVENOUS at 04:48

## 2019-01-25 RX ADMIN — PHENYLEPHRINE HYDROCHLORIDE 100 MCG: 10 INJECTION, SOLUTION INTRAMUSCULAR; INTRAVENOUS; SUBCUTANEOUS at 14:03

## 2019-01-25 RX ADMIN — HYDROMORPHONE HYDROCHLORIDE 0.5 MG: 1 INJECTION, SOLUTION INTRAMUSCULAR; INTRAVENOUS; SUBCUTANEOUS at 20:16

## 2019-01-25 RX ADMIN — ROCURONIUM BROMIDE 40 MG: 10 INJECTION INTRAVENOUS at 13:49

## 2019-01-25 RX ADMIN — NEOSTIGMINE METHYLSULFATE 5 MG: 1 INJECTION, SOLUTION INTRAVENOUS at 14:35

## 2019-01-25 RX ADMIN — IPRATROPIUM BROMIDE AND ALBUTEROL SULFATE 3 ML: .5; 2.5 SOLUTION RESPIRATORY (INHALATION) at 19:06

## 2019-01-25 RX ADMIN — FENTANYL CITRATE 50 MCG: 50 INJECTION, SOLUTION INTRAMUSCULAR; INTRAVENOUS at 13:49

## 2019-01-25 RX ADMIN — PHENYLEPHRINE HYDROCHLORIDE 200 MCG: 10 INJECTION, SOLUTION INTRAMUSCULAR; INTRAVENOUS; SUBCUTANEOUS at 14:11

## 2019-01-25 RX ADMIN — ROCURONIUM BROMIDE 10 MG: 10 INJECTION INTRAVENOUS at 13:58

## 2019-01-25 RX ADMIN — HYDROMORPHONE HYDROCHLORIDE 0.5 MG: 1 INJECTION, SOLUTION INTRAMUSCULAR; INTRAVENOUS; SUBCUTANEOUS at 09:17

## 2019-01-25 RX ADMIN — HYDROMORPHONE HYDROCHLORIDE 0.5 MG: 1 INJECTION, SOLUTION INTRAMUSCULAR; INTRAVENOUS; SUBCUTANEOUS at 18:03

## 2019-01-25 RX ADMIN — ASPIRIN 81 MG 81 MG: 81 TABLET ORAL at 09:00

## 2019-01-25 RX ADMIN — MICONAZOLE NITRATE: 2 POWDER TOPICAL at 20:22

## 2019-01-25 RX ADMIN — PHENYLEPHRINE HYDROCHLORIDE 100 MCG: 10 INJECTION, SOLUTION INTRAMUSCULAR; INTRAVENOUS; SUBCUTANEOUS at 14:09

## 2019-01-25 RX ADMIN — Medication 5 MG: at 21:22

## 2019-01-25 RX ADMIN — IPRATROPIUM BROMIDE AND ALBUTEROL SULFATE 3 ML: .5; 2.5 SOLUTION RESPIRATORY (INHALATION) at 07:42

## 2019-01-25 RX ADMIN — NYSTATIN 500000 UNITS: 500000 SUSPENSION ORAL at 18:03

## 2019-01-25 RX ADMIN — HYDROMORPHONE HYDROCHLORIDE 0.5 MG: 1 INJECTION, SOLUTION INTRAMUSCULAR; INTRAVENOUS; SUBCUTANEOUS at 11:22

## 2019-01-25 RX ADMIN — PHENYLEPHRINE HYDROCHLORIDE 100 MCG: 10 INJECTION, SOLUTION INTRAMUSCULAR; INTRAVENOUS; SUBCUTANEOUS at 13:55

## 2019-01-25 RX ADMIN — NYSTATIN 500000 UNITS: 500000 SUSPENSION ORAL at 12:31

## 2019-01-25 RX ADMIN — GLYCOPYRROLATE 1 MG: 0.2 INJECTION, SOLUTION INTRAMUSCULAR; INTRAVENOUS at 14:35

## 2019-01-25 RX ADMIN — IPRATROPIUM BROMIDE AND ALBUTEROL SULFATE 3 ML: .5; 2.5 SOLUTION RESPIRATORY (INHALATION) at 15:11

## 2019-01-25 RX ADMIN — PHENYLEPHRINE HYDROCHLORIDE 100 MCG: 10 INJECTION, SOLUTION INTRAMUSCULAR; INTRAVENOUS; SUBCUTANEOUS at 13:58

## 2019-01-25 ASSESSMENT — ACTIVITIES OF DAILY LIVING (ADL)
ADLS_ACUITY_SCORE: 18
ADLS_ACUITY_SCORE: 18
ADLS_ACUITY_SCORE: 15
ADLS_ACUITY_SCORE: 17
ADLS_ACUITY_SCORE: 14
ADLS_ACUITY_SCORE: 18

## 2019-01-25 ASSESSMENT — ENCOUNTER SYMPTOMS: DYSRHYTHMIAS: 1

## 2019-01-25 ASSESSMENT — MIFFLIN-ST. JEOR: SCORE: 1459

## 2019-01-25 NOTE — ANESTHESIA CARE TRANSFER NOTE
Patient: Priya Funez    Procedure(s):  TRACHEOSTOMY  (DR MADRID)  PERCUTANEOUS INSERTION TUBE GASTROSTOMY  (DR MCCONNELL)    Diagnosis: RESPIRATORY FAILURE  Diagnosis Additional Information: No value filed.    Anesthesia Type:   General     Note:  Airway :Tracheostomy  Patient transferred to:ICU  ICU Handoff: Call for PAUSE to initiate/utilize ICU HANDOFF, Identified Patient, Identified Responsible Provider, Reviewed the Pertinent Medical History, Discussed Surgical Course, Reviewed Intra-OP Anesthesia Management and Issues during Anesthesia, Set Expectations for Post Procedure Period and Allowed Opportunity for Questions and Acknowledgement of Understanding      Vitals: (Last set prior to Anesthesia Care Transfer)    CRNA VITALS  1/25/2019 1411 - 1/25/2019 1451      1/25/2019             Resp Rate (observed):  30    Resp Rate (set):  10                Electronically Signed By: TAMRA Park CRNA  January 25, 2019  2:51 PM

## 2019-01-25 NOTE — ANESTHESIA POSTPROCEDURE EVALUATION
Patient: Priya Funez    Procedure(s):  TRACHEOSTOMY  (DR MADRID)  PERCUTANEOUS INSERTION TUBE GASTROSTOMY  (DR MCCONNELL)    Diagnosis:RESPIRATORY FAILURE  Diagnosis Additional Information: No value filed.    Anesthesia Type:  General    Note:  Anesthesia Post Evaluation    Patient location during evaluation: bedside and ICU  Patient participation: Unable to evaluate secondary to administered sedation  Level of consciousness: obtunded/minimal responses  Pain management: adequate  Airway patency: patent  Cardiovascular status: acceptable  Respiratory status: acceptable and intubated  Hydration status: acceptable  PONV: none     Anesthetic complications: None    Comments: No anesthetic complications noted.         Last vitals:  Vitals:    01/25/19 1200 01/25/19 1300 01/25/19 1511   BP: (!) 86/50 100/58    Pulse: 69 69    Resp:      Temp: 37.7  C (99.9  F) 37.4  C (99.3  F)    SpO2:   99%         Electronically Signed By: Jcarlos Maguire DO, DO  January 25, 2019  3:39 PM

## 2019-01-25 NOTE — PROGRESS NOTES
Vascular Surgery Progress Note    Remains in critical condition in ICU, reintubated and currently vented due to decompensation despite thoracentesis. Plans for trach +/- Peg today (heparin and TF on hold).  Had been getting TF at goal     B/P: 88/52, T: 99, P: 69, R: 16  Vented and sedated  Abd soft, slightly less distended than prior, no guarding or rigidity.    WBC   Date Value Ref Range Status   01/25/2019 12.9 (H) 4.0 - 11.0 10e9/L Final   ]  Hemoglobin   Date Value Ref Range Status   01/25/2019 6.6 (LL) 11.7 - 15.7 g/dL Final     Comment:     This result has been called to CATHERINE RYAN IN ICU by Venita BLANCAS on 01 25 2019 at   0325, and has been read back.      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    Vent dependent respiratory failure, planning for trach and peg today  Resume tube feedings once able per general surgery.  Serial abdominal exams   Rocephin for UTI  Received 1 unit pRBC for hgb 6.6 this am.  Vascular surgery will follow peripherally, please call if any acute concerns.    Saige Brown MD  Vascular Surgery Fellow  Pager [phone]

## 2019-01-25 NOTE — PLAN OF CARE
Patient with plans for trach and PEG this afternoon.  Will discontinue current SLP order.  Recommend re-order SLP services when appropriate for cuff deflation for speaking valve trials, etc.

## 2019-01-25 NOTE — CONSULTS
General surgery  Percutaneous endoscopic gastrostomy tube placement discussed with the patient's family.  They are willing to proceed.  We will coordinate the procedure with the tracheostomy this afternoon.  Consent signed.  Daniel Castañeda MD  General Surgery, Office [phone]

## 2019-01-25 NOTE — PLAN OF CARE
Alert, interactive, to OR for trach and Peg this afternoon.  Remains on modest dose of Norepinephrine to support BP and low dose Dexmeditomidine to control anxiety.  Family at bedside off and on throughout the day, aware of plan of care.

## 2019-01-25 NOTE — PROGRESS NOTES
Ventilation Mode: CMV/AC  (Continuous Mandatory Ventilation/ Assist Control)  FiO2 (%): 70 %  Rate Set (breaths/minute): 16 breaths/min  Tidal Volume Set (mL): 480 mL  PEEP (cm H2O): 5 cmH2O  Oxygen Concentration (%): 70 %  Resp: 16    Recent Labs   Lab 01/24/19  0837 01/20/19 2000 01/19/19  1028   PH 7.50* 7.49* 7.49*   PCO2 33* 35 34*   PO2 33* 134* 117*   HCO3 26 27 26   O2PER  --  40% 60%     Plan: continue with full vent support and assess for weaning trial daily.    Arturo Harley

## 2019-01-25 NOTE — PLAN OF CARE
Pt follows commands and nods/shakes head in response to questions. Denies pain. Drowsy on precedex and fentanyl gtts. Low dose levo to maintain MAP 65. Rocephin started for UTI. NPO since midnight for peg and trach today, and heparin stopped at 0600 for same procedures.

## 2019-01-25 NOTE — PROGRESS NOTES
[facility] ICU RESPIRATORY NOTE        Date of Admission: 1/4/2019    Date of Intubation (most recent): 01/24/2019    Trach- 01/25/2019    Reason for Mechanical Ventilation: Respiratory Failure- Failed extubation.     Number of Days on Mechanical Ventilation: 2    Met Criteria for Pressure Support Trial: No      Reason for No Pressure Support Trial: trach placed today.     Significant Events Today: Trach placed today. 6.0 shiley DCT    ABG Results:   Recent Labs   Lab 01/24/19  0837 01/20/19  2000 01/19/19  1028   PH 7.50* 7.49* 7.49*   PCO2 33* 35 34*   PO2 33* 134* 117*   HCO3 26 27 26   O2PER  --  40% 60%       Current Vent Settings: Ventilation Mode: CMV/AC  (Continuous Mandatory Ventilation/ Assist Control)  FiO2 (%): 40 %  Rate Set (breaths/minute): 16 breaths/min  Tidal Volume Set (mL): 480 mL  PEEP (cm H2O): 5 cmH2O  Oxygen Concentration (%): 40 %  Resp: 15      Skin Assessment: No skin issues    Plan: SBT tomorrow as tolerated.     Shelley Carbajal

## 2019-01-25 NOTE — PROGRESS NOTES
Renal Medicine Progress Note                                Priya Funez MRN# [id number]   Age: 78 year old YOB: 1940   Date of Admission: 1/4/2019 Hospital LOS: 21                  Assessment/Plan:     Following for ARF presumed ATN post emergent type A dissection repair    1.  Baseline creatinine < 1.0 based on presenting creatinine  2.  ARF   -ATN   -non oliguric   -FENA 0.3 % 01/24/19  3.  Increased extravascular volume      Hold diuretic today   Re assess in am  Following         Interval History:     Follows.  Remains vented.  For trach today.  Lasix gtt stopped last pm.  FENA noted.  Increased UO post fluid challenge    ROS:     Intubated: ROS unable    Medications and Allergies:     Reviewed    Physical Exam:     Vitals were reviewed  Patient Vitals for the past 12 hrs:   BP Temp Pulse Heart Rate Resp SpO2 Weight   01/25/19 0630 (!) 88/52 99  F (37.2  C) 69 69 16 98 % --   01/25/19 0615 (!) 86/58 99  F (37.2  C) 69 69 16 99 % --   01/25/19 0600 123/70 98.8  F (37.1  C) 70 70 -- 100 % --   01/25/19 0545 108/67 98.8  F (37.1  C) 69 69 15 100 % --   01/25/19 0530 130/85 99.5  F (37.5  C) 70 70 13 100 % --   01/25/19 0515 134/73 -- 69 70 16 99 % --   01/25/19 0500 (!) 75/44 99.5  F (37.5  C) 69 69 -- 96 % 98.4 kg (216 lb 14.9 oz)   01/25/19 0445 99/59 99.5  F (37.5  C) 69 -- -- -- --   01/25/19 0430 103/53 99.5  F (37.5  C) 69 69 16 99 % --   01/25/19 0415 (!) 80/55 99.7  F (37.6  C) 69 69 15 99 % --   01/25/19 0400 100/67 99.7  F (37.6  C) 69 70 -- 98 % --   01/25/19 0345 92/61 99.7  F (37.6  C) 69 69 16 99 % --   01/25/19 0336 -- -- -- -- -- 99 % --   01/25/19 0330 120/68 99.7  F (37.6  C) 69 69 -- 97 % --   01/25/19 0315 135/73 99.7  F (37.6  C) 70 70 24 98 % --   01/25/19 0300 107/68 99.9  F (37.7  C) 69 69 -- 92 % --   01/25/19 0245 90/61 99.9  F (37.7  C) 69 69 -- 97 % --   01/25/19 0230 99/64 100  F (37.8  C) 69 69 -- 94 % --   01/25/19 0215 99/60 100  F (37.8  C) 69 69 -- 97 % --    01/25/19 0200 117/70 100.2  F (37.9  C) 69 69 -- 96 % --   01/25/19 0146 106/83 -- -- 70 -- 99 % --   01/25/19 0145 -- 100.4  F (38  C) 71 71 -- 98 % --   01/25/19 0130 (!) 89/56 100.6  F (38.1  C) 69 69 -- 96 % --   01/25/19 0115 95/45 100.6  F (38.1  C) 70 69 -- 96 % --   01/25/19 0100 (!) 83/54 100.8  F (38.2  C) 69 69 -- 97 % --   01/25/19 0045 (!) 94/38 100.8  F (38.2  C) 69 69 -- 97 % --   01/25/19 0030 91/62 100.8  F (38.2  C) 69 -- -- -- --   01/25/19 0015 90/61 100.8  F (38.2  C) 69 69 -- 97 % --   01/25/19 0000 104/59 100.8  F (38.2  C) 69 -- -- -- --   01/24/19 2345 131/85 100.8  F (38.2  C) 69 69 9 (!) 89 % --   01/24/19 2340 -- -- -- -- -- 91 % --   01/24/19 2330 108/74 -- 70 -- -- -- --   01/24/19 2315 131/67 100.8  F (38.2  C) 70 70 17 100 % --   01/24/19 2300 119/70 100.9  F (38.3  C) 69 -- -- -- --   01/24/19 2245 95/52 100.9  F (38.3  C) 69 69 18 100 % --   01/24/19 2230 (!) 87/54 100.9  F (38.3  C) 69 69 21 100 % --   01/24/19 2215 97/59 100.9  F (38.3  C) 70 70 -- 100 % --     I/O last 3 completed shifts:  In: 2406.78 [I.V.:811.78; NG/GT:370]  Out: 343 [Urine:343]    Vitals:    01/21/19 0600 01/22/19 0600 01/23/19 0400 01/24/19 0534   Weight: 99.2 kg (218 lb 11.1 oz) 97.8 kg (215 lb 9.8 oz) 96.6 kg (213 lb) 96.6 kg (212 lb 15.4 oz)    01/25/19 0500   Weight: 98.4 kg (216 lb 14.9 oz)         GENERAL:  Intubated; follows  HEENT: ETT  RESP:  clear anteriorly  CV: RRR, normal S1 S2  ABDOMEN: soft, nontender, no HSM or masses and bowel sounds normal  :  coello catheter  SKIN: clear without significant rashes or lesions  EXT: 2 plus edema    Data:     Recent Labs   Lab 01/25/19  0306 01/24/19  1730 01/24/19  0455 01/23/19  2059 01/23/19  0415    141 138  --  139   POTASSIUM 4.6 4.9 4.1 4.3 3.3*   CHLORIDE 101 104 101  --  102   CO2 27 27 27  --  29   ANIONGAP 9 10 10  --  8   * 148* 154*  --  140*   BUN 90* 83* 76*  --  74*   CR 1.90* 1.75* 1.47*  --  1.30*   GFRESTIMATED 25* 28* 34*   --  40*   GFRESTBLACK 29* 32* 40*  --  46*   BESS 7.8* 8.0* 8.5  --  8.8         Recent Labs   Lab 01/25/19  0306 01/24/19  1730 01/24/19  0455 01/23/19  0415 01/22/19  0435 01/21/19  0430 01/20/19  0400 01/19/19  0430   CR 1.90* 1.75* 1.47* 1.30* 1.28* 1.27* 1.56* 1.71*     Recent Labs   Lab 01/24/19 1730 01/21/19  0430   ALBUMIN 3.1* 3.6     Recent Labs   Lab 01/25/19  0306 01/24/19 1730 01/24/19  1403 01/24/19 0455 01/23/19 2059 01/23/19 0415 01/21/19  0430   PHOS  --  4.0  --  4.0 2.7  --   --  3.5   HGB 6.6*  --  7.3* 7.5*  --  7.6*   < > 8.0*    < > = values in this interval not displayed.     Recent Labs   Lab 01/24/19  1825   FENAPR 0.3       G Narinder Brown    [facility] - Nephrology  [phone]

## 2019-01-25 NOTE — OP NOTE
DATE OF PROCEDURE: January 25, 2019      SURGEON:  Bryson Mcclelland MD   FIRST ASSIST: Savannah Glass PA-C      PREOPERATIVE DIAGNOSIS:  Respiratory failure.       POSTOPERATIVE DIAGNOSIS:  Respiratory failure.       PROCEDURE:  Tracheostomy with Shiley #6 cuffed nonfenestrated tube.       ANESTHESIA:  General.       INDICATIONS:  Patient has respiratory failure and will require prolonged mechanical ventilation.       DESCRIPTION OF PROCEDURE:  The patient was brought to the operating room on the ICU bed.  Under general anesthesia, the patient's neck was extended.  Neck and upper chest were prepared and draped in the usual fashion using ChloraPrep.  A transverse incision was made approximately 2 cm above the sternal notch.  Dissection was carried down to the midline of the strap muscle.  The inferior aspect of the isthmus of the thyroid was divided.  Hemostasis was excellent.  The second ring of the trachea was clearly identified, and some sutures of 2-0 Prolene were placed around the second ring laterally on each side.  A longitudinal tracheotomy was made and dilated.  The endotracheal tube was pulled just above the tracheotomy, and a Shiley #6 cuffed nonfenestrated tube was advanced without any difficulty.  The cuff was inflated.  Ventilation was excellent.  Hemostasis was again verified and was excellent.  Incision was closed with interrupted 3-0 nylon suture on the skin along the tracheostomy.  A dressing was applied, and the tracheostomy was secured around the patient's neck.       Dr. Castañeda placed the PEG tube           BRYSON MCCLELLAND MD

## 2019-01-25 NOTE — PROVIDER NOTIFICATION
Intensivist notified of UA results, Hgb 6.6, and continued low UOP. Pt received albumin last night and remains on lasix gtt. Will transfuse 1 unit PRBC.

## 2019-01-25 NOTE — PROGRESS NOTES
[facility]  Critical Care Service  Progress Note  Date of Service (when I saw the patient): 01/25/2019     Main Plans for Today    Trach and PEG placement   Stop lasix gtt  1 PRBC   Rocephin for UTI       Assessment & Plan    Priya Funez is a 78 year old female with PMH of HTN admitted on 1/4/2019 with sudden onset of back pain found to have type A aortic dissection and sent for emergency aortic dissection repair with graft.  She was admitted to ICU for ongoing care.     Interim  events:   1/5: did well overnight.  Epi weaned off; requiring low dose phenylephrine.  Hgb stable.  Extubated in the afternoon.   1/6:poor inspiratory effort and weak cough through the day.     Placed on Bipap overnight.NTG gtt for improved BP control.  Rigors and fever  103  1/7: Fever resolved;    stable respiratory status on HFNC.SvO2 48; getting echocardiogram. EF 55-60%   1/8: Rapid A Fib  1/9: Titrating down FiO2; improved ABG and VBG   1/11-12, hypoxic requiring bipap with little reserve, low UO   fluid resuscitated  with albumin blood and lasix  1/13: re-intubated late on 1/12; significant metabolic acidosis, increased work of breathing   1/18/19: extubated to BIPAP  1/24/19 Thoracentesis. Worsening hypoxic respiratory failure. Reintubation.   1/25/19 Trach and PEG placement      Neuro  1. ICU delirium  2. Acute pain  3. Sedation   Plan:  --Precedex and fentanyl infusion for sedation, noted hypotension with propofol    --PRN acetaminophen, oxycodone  --scheduled seroquel, 25 mg at HS, decreased over the last several days. PRN available   --Maintain circadian rhythm.  Lights on during the day.  Off at night, minimize cares at night.  OOB during the day.        CV  1. S/p Type A aortic dissection repair 1/4 with Davis Little, and Michaela  2. HTN  3. Afib with RVR, uncontrolled with medications now s/p AV node ablation and pacemaker placement 1/11/19  4. Hypotension, improved     Plan:  --CV Surgery and  Vascular surgery primary   --Monitor hemodynamics.  Maintain SBP<130; MAP >65  --Cardiac meds per CV surgery  --Echocardiogram 1/7; EF 50-55%; IVC not dilated, ventricles small   --Warfarin for Afib  --Started on low dose levophed yesterday. Wean as able. Suspect hypovolemia given noted improvement with albumin and PRBC      Resp:  1. Acute hypoxemic /hypercapnic respiratory failure; re-intubated 1/12. Extubated 1/18, reintubated 1/24   2. Bilateral pleural effusion   -- CT CAP 1/14 with moderate amount of fluid in fissures, interstitial edema; paralyzed right hemidiaphragm   -- Subsequent CXR showing persistent bilateral pleural effusions R > L.    Plan:   -- CMV:  16/480/5/70%.   -- Wean FiO2 as able   -- scheduled duoneb; albuterol nebs PRN  -- Thoracentesis performed on 1/24. ~ 400 ml removed from right.   -- Discussed with family last week and today, patient will need a tracheostomy given failure of extubations.   -- Planned for trach this afternoon     GI/Nutrition  1. Malnutrition, nia/pro deficits  2. diarrhea; presumed colitis. Resolved   Plan:  --GI consulted. Flex sig performed 1/15; diverticulum look fine, mucosa inflamed/congested; favoring colitis. No ischemia noted.  Biopsy showing-- Nonneoplastic colonic mucosa with microscopic lymphoid aggregate, no evidence of microscopic colitis, inflammatory bowel disease or malignancy.  GI recommends Budesenide, 9mg daily. Unable to give via feeding tube. Completed  5 day course oral prednisone    -- NJ placed for feed, remove once able to use PEG   -- TF to goal on hold for procedure   --  PEG placement today       Renal  1. IMAN; abdominal aortic dissection involving right renal artery.  Atrophic right kidney with decreased flow. No prior hx.  Baseline creatinine appears to be 0.8;   2. Metabolic alkalosis; resolved   3. Hypophosphatemia; resolved    4. Metabolic acidosis; resolved   5. Hypernatremia; resolved   6. Hypervolemia   --Lasix drip stopped this 1/14;  patient requiring vasopressors due to hypotension following >24 hours of diuretic drip; worsening renal function; metabolic alkalosis. Was placed on scheduled diuretics thereafter   --Renogram 1/7.  Renal US 1/6 with flow present in both R/L renal arteries. Right kidney atrophic.      Plan:  --Nephrology consulted.   --Restarted on lasix gtt 1/24. However little UOP, FENA  0.3, received albumin and PRBC with improvement in UOP. Will stop lasix gtt as Crt/BUN worsening.    --Continue enteric free water at 100 ml every 4 hours  .  --TKO fluids   --Appreciate fluid and diuresis management from nephrology   --ICU electrolyte replacement protocol      ID  1. Presumptive UTI  --Brittany-op Cefepime and Vancomycin; completed    --Cefepime completed 6 days  --Vanco and millie 1/12--1/17      Plan:  Given worsening respiratory stable, slight elevation in WBC, and low grade fever:  -- Thoracentesis cultures sent- NGTD   -- Blood cultures x2- NGTD   -- Sputum/BAL- NGTD  -- UA - grossly positively. Started on IV Rocephin. Follow cultures until speciation. Barrientos replaced 1/24    Endocrine  1. Stress Hyperglycemia  Plan:  --BG check Q 4 hours. Medium resistance sliding scale insulin as needed      Heme:  1. Acute blood loss anemia  2. Thrombocytopenia; resolved   3. Superficial thrombophlebitis in the right basilic vein   4. Right internal jugular thrombus   Plan:  --Monitor hemoglobin. Transfuse to keep > 7.0. Will give 1 PRBC now for Hgb 6.6  --Warfarin for A Fib and right internal jugular thrombus;  Reversed 1/23 for thora and now for trach and PEG. Placed on heparin gtt overnight, but on hold now.   --US lower extremities to assess for DVT; no evidence of DVT      MSK/Skin  1. Sternotomy   Plan:  -- Sternal precautions  -- Wound care per protocol      General cares:  DVT Prophylaxis: continue PCD's; heparin gtt.   GI Prophylaxis: PPI  Restraints: Restraints for medical healing needed: NO  Family update by me today: Yes  Current  lines are required for patient management  Access: JEANNE Faustin CNP  Time Spent on this Encounter   Billing:  I spent 30 minutes bedside and on the inpatient unit today managing the critical care of Priya Funez in relation to the issues listed in this note.    Interval History    Course reviewed. Hgb 6.6 this AM. Infusing PRBC now. Lasix gtt stopped as no UOP as a result and responded well to fluids. Plan for trach and PEG.     Physical Exam   Temp: 99  F (37.2  C) Temp src: Bladder Temp  Min: 97.7  F (36.5  C)  Max: 100.9  F (38.3  C) BP: (!) 88/52 Pulse: 69 Heart Rate: 69 Resp: 16 SpO2: 98 % O2 Device: Mechanical Ventilator    Vitals:    19 0400 19 0534 19 0500   Weight: 96.6 kg (213 lb) 96.6 kg (212 lb 15.4 oz) 98.4 kg (216 lb 14.9 oz)     I/O last 3 completed shifts:  In: 2406.78 [I.V.:811.78; NG/GT:370]  Out: 343 [Urine:343]    GEN: Intubated. NAD. Following commands   EYES: PERRL,  Anicteric sclera.   HEENT:  Normocephalic, atraumatic, trachea midline  CV: RRR,   PULM/CHEST:  Course anterior breath sounds bilaterally.   GI: soft + bs   : coello catheter in place, urine yellow and clear  EXTREMITIES: +++ peripheral edema moving all extremities, peripheral pulses intact  NEURO: carroll follows commands   SKIN: warm and dry  Imaging personally reviewed:  CXR   EC% V-paced     Data   Recent Labs   Lab 19  0306 19  1730 19  1403 19  0455  19  0415  19  0430   WBC 12.9*  --  13.8* 13.2*  --  11.9*   < > 14.8*   HGB 6.6*  --  7.3* 7.5*  --  7.6*   < > 8.0*   MCV 90  --  90 92  --  91   < > 89     --  407 375  --  336   < > 346   INR 1.72*  --   --  1.57*  --  3.37*   < > 2.16*    141  --  138  --  139   < > 138   POTASSIUM 4.6 4.9  --  4.1   < > 3.3*   < > 3.4   CHLORIDE 101 104  --  101  --  102   < > 101   CO2 27 27  --  27  --  29   < > 30   BUN 90* 83*  --  76*  --  74*   < > 72*   CR 1.90* 1.75*  --  1.47*  --  1.30*   < >  1.27*   ANIONGAP 9 10  --  10  --  8   < > 7   BESS 7.8* 8.0*  --  8.5  --  8.8   < > 8.3*   * 148*  --  154*  --  140*   < > 112*   ALBUMIN  --  3.1*  --   --   --   --   --  3.6   PROTTOTAL  --   --   --   --   --  6.2*  --  5.8*   BILITOTAL  --   --   --   --   --   --   --  1.0   ALKPHOS  --   --   --   --   --   --   --  73   ALT  --   --   --   --   --   --   --  22   AST  --   --   --   --   --   --   --  15    < > = values in this interval not displayed.

## 2019-01-25 NOTE — ANESTHESIA PREPROCEDURE EVALUATION
Anesthesia Pre-Procedure Evaluation    Patient: Priya Funez   MRN: [id number] : 1940          Preoperative Diagnosis: RESPIRATORY FAILURE    Procedure(s):  TRACHEOSTOMY  (DR MADRID)  PERCUTANEOUS INSERTION TUBE GASTROSTOMY  (DR MCCONNELL)    Past Medical History:   Diagnosis Date     Hypertension      Past Surgical History:   Procedure Laterality Date     ANGIOGRAM Right 2019    Procedure: DIAGONSTIC ANGIOGRAM OF SMA;  Surgeon: Rigo Jennings MD;  Location:  OR     BYPASS GRAFT ARTERY CORONARY, REPAIR VALVE AORTIC, COMBINED N/A 2019    Procedure: AORTIC DISSECTION REPAIR WITH GRAFT- HEMASHIELD PLATINUM WOVEN DOUBLE VELOR 4 SIDE ARM VASCULAR GRAFT, D:35 X 12 X 10 X 10MM/ L:50CM;  Surgeon: Jose Winslow MD;  Location:  OR     EP ABLATION AV NODE N/A 2019    Procedure: EP Ablation AV Node;  Surgeon: Tsering Davey MD;  Location:  [facility]     EP PACEMAKER Left 2019    Procedure: EP Pacemaker;  Surgeon: Tsering Davey MD;  Location:  [facility]       Anesthesia Evaluation     .             ROS/MED HX    ENT/Pulmonary:     (+), . Other pulmonary disease Vented.    Neurologic: Comment: Sedated with precedex gtt      Cardiovascular: Comment: Ascending aortic dissection - 18. Reintubation x 2. Now for tracheostomy and PEG      (+) hypertension----. : . . . pacemaker :- Patient is dependent on pacemaker . dysrhythmias .      (-) ICD   METS/Exercise Tolerance:     Hematologic:         Musculoskeletal:         GI/Hepatic:  - neg GI/hepatic ROS       Renal/Genitourinary:  - ROS Renal section negative       Endo:  - neg endo ROS       Psychiatric:         Infectious Disease:         Malignancy:         Other:                          Physical Exam  Normal systems: cardiovascular and dental    Airway   Comment: ETT in situ    Dental     Cardiovascular       Pulmonary (+) decreased breath sounds               Lab Results   Component Value Date    WBC  "12.9 (H) 01/25/2019    HGB 6.6 (LL) 01/25/2019    HCT 20.4 (L) 01/25/2019     01/25/2019     01/25/2019    POTASSIUM 4.6 01/25/2019    CHLORIDE 101 01/25/2019    CO2 27 01/25/2019    BUN 90 (H) 01/25/2019    CR 1.90 (H) 01/25/2019     (H) 01/25/2019    BESS 7.8 (L) 01/25/2019    PHOS 4.0 01/24/2019    MAG 2.3 01/25/2019    ALBUMIN 3.1 (L) 01/24/2019    PROTTOTAL 6.2 (L) 01/23/2019    ALT 22 01/21/2019    AST 15 01/21/2019    ALKPHOS 73 01/21/2019    BILITOTAL 1.0 01/21/2019    LIPASE 145 01/04/2019    PTT 34 01/04/2019    INR 1.72 (H) 01/25/2019    FIBR 148 (L) 01/04/2019       Preop Vitals  BP Readings from Last 3 Encounters:   01/25/19 (!) 136/97   09/14/08 118/80    Pulse Readings from Last 3 Encounters:   01/25/19 69      Resp Readings from Last 3 Encounters:   01/25/19 14    SpO2 Readings from Last 3 Encounters:   01/25/19 94%      Temp Readings from Last 1 Encounters:   01/25/19 37.6  C (99.7  F) (Bladder)    Ht Readings from Last 1 Encounters:   01/04/19 1.626 m (5' 4\")      Wt Readings from Last 1 Encounters:   01/25/19 98.4 kg (216 lb 14.9 oz)    Estimated body mass index is 37.24 kg/m  as calculated from the following:    Height as of this encounter: 1.626 m (5' 4\").    Weight as of this encounter: 98.4 kg (216 lb 14.9 oz).       Anesthesia Plan      History & Physical Review  History and physical reviewed and following examination; no interval change.    ASA Status:  4 .    NPO Status:  > 8 hours    Plan for General (ETT in situ) with Intravenous induction. Maintenance will be Balanced.    PONV prophylaxis:  Ondansetron (or other 5HT-3)       Postoperative Care  Postoperative pain management:  IV analgesics.      Consents  Anesthetic plan, risks, benefits and alternatives discussed with:  Healthcare Power of ..                 Jcarlos Maguire, DO, DO  "

## 2019-01-25 NOTE — BRIEF OP NOTE
[facility]    Brief Operative Note    Pre-operative diagnosis: RESPIRATORY FAILURE  Post-operative diagnosis respiratory failure  Procedure: Procedure(s):  TRACHEOSTOMY  (DR MCCLELLAND)  PERCUTANEOUS INSERTION TUBE GASTROSTOMY  (DR MCCONNELL),  6 Shiley, nonfenestrated, cuffed trach  Surgeon: Surgeon(s) and Role:  Panel 1:     * Bryson Mcclelland MD - Primary  Panel 2:     * Aaron Mcconnell MD - Primary  Anesthesia: General   Estimated blood loss: 2 cc  Drains: None  Specimens: * No specimens in log *  Findings:   None.  Complications: None.  Implants: Gastrostomy tube

## 2019-01-25 NOTE — PROGRESS NOTES
[facility]  Cardiovascular and Thoracic Surgery Daily Note          Assessment and Plan:   POD#21 s/p Left groin cutdown with exposure of the femoral vessels with arterial cannulation for cardiopulmonary bypass. Right groin cutdown with exposure of the femoral vessels with the right femoral artery ultimately used for diagnostic angiography. 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 Visceral angiography. Repair of the bilateral femoral vessels by Dr. Jose Winslow.    Main Plans for today:  1. Trach and PEG today at 2 pm  2. Lasix drip discontinued  3. Received 1 unit PRBCs  4. Progress as able    CVS:   Blood pressure supported with low dose norepi today, HR 80/ 100% VVI paced.  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 On oral amiodarone. Heparin drip until able to resume Coumadin.  Of note has superficial right arm thrombophlebits and right internal jugular thrombus 2/2 previous central line.  Pulm:  Extubated per protocol and reintubated on POD #8 for resp distress. Extubated POD #14 and reintubated POD #20.  CT chest on 1/14 demonstrates RLL compressive atelectasis 2/2 elevated hemidiaphragm (present preoperatively). CXR/CT chest with fluid in fissures.   POD #7 left US guided thoracentesis with removal of 150 mL fluid.   POD #20 right US guided thoracentesis with removal of 400 mL of fluid.  CXR today demonstrates persistent elevated right hemidiaphragm, and bilateral pulmonary infiltrates.   Trach and PEG and 2 pm today  Current vent settings: 70%/480/16/5 with adequate saturations  Neuro:  Grossly intact, weakly follows commands. Intermittently confused and anxious. Mixed delirium. On seroquel.  Pain and sedation well managed with current regimen precedex and fentanyl drips  Deconditioned - working with PT/OT/speech  Renal:   IMAN  -BL Creat ~0.8, 1.9today; Preop weight 77.1 kg, today 98.4 kg, up 1.8 kg overnight.   Renal US on  with patent flow in bilateral renal arteries. Right kidney atrophic.  Renal on board - appreciate. Appreciate. Lasix drip discontinued by neph today.  Fluid overload with significant edema.  BMP in AM  GI:  Malnourished. Receiving tube feedings via NJ feeding tube. PEG placement today.  Five day course of oral prednisone completed for suspected microscopic colitis, flex sig biopsy negative. Loose stools improving.  CT abdomen on  did not demonstrate acute pathology.  Dysphagia 2/2 deconditioning.   and  negative for c. Diff  On PPI  :  Barrientos discontinued on , replaced on  when reintubated  Endo:   Preop A1C 5.3; managed on sliding scale insulin. Goal BG <180  FEN:   Replace lytes as needed; Continue tube feedings via postpyloric feeding tube/PEG after OR as per general surgery.  ID:   Temp (24hrs), Av.3  F (37.9  C), Min:97.7  F (36.5  C), Max:100.9  F (38.3  C)  Stress induced leukocytosis. WBC 12.9.   + UA on  - Ceftriaxone started  Sputum + candida,on nystatin; BC neg.  Heme:   Acute blood loss anemia and thrombocytopenia: Hgb 6.6; Plt 425- transfused with one unit of PRBCs this morning.  Tubes/Drains:  Left internal jugular central line  Feeding tube  Barrientos  Proph:   BB when able, statin, ASA, coumadin (held for now), PCD, PPI  Dispo:   Continue in ICU with close monitoring  Appreciate consulting services          Interval History:   Lying in bed on ventilator, appears comfortable. Interactive, follows commands. No acute events overnight. Trach and PEG at 2 today.         Medications:       aspirin  81 mg Per Feeding Tube Daily     cefTRIAXone  1 g Intravenous Q24H     influenza Vac Split High-Dose  0.5 mL Intramuscular Prior to discharge     insulin aspart  1-6 Units Subcutaneous Q4H     ipratropium - albuterol 0.5 mg/2.5 mg/3 mL  3 mL Nebulization 4x daily     lidocaine (PF)  10  "mL Subcutaneous Once     melatonin  5 mg Sublingual At Bedtime     metoprolol tartrate  25 mg Per Feeding Tube BID     nystatin  500,000 Units Oral 4x Daily     pantoprazole  40 mg Oral QAM AC     potassium chloride  20 mEq Oral Daily     QUEtiapine  25 mg Oral At Bedtime     sodium chloride (PF)  10 mL Intracatheter Q8H     acetaminophen, albuterol, IV fluid REPLACEMENT ONLY, glucose **OR** dextrose **OR** glucagon, heparin lock flush, hydrALAZINE, HYDROmorphone, lidocaine 4%, lidocaine (buffered or not buffered), lidocaine (buffered or not buffered), magnesium sulfate, magnesium sulfate, - MEDICATION INSTRUCTIONS -, - MEDICATION INSTRUCTIONS -, methocarbamol, naloxone, ondansetron **OR** ondansetron, oxyCODONE IR, potassium chloride, potassium chloride with lidocaine, potassium chloride, potassium chloride, potassium chloride, QUEtiapine, sodium chloride (PF), sodium chloride (PF), sodium chloride (PF), [COMPLETED] sodium phosphate **FOLLOWED BY** sodium phosphate, sodium phosphate, sodium phosphate, sodium phosphate, sodium phosphate          Physical Exam:   Vitals were reviewed  Blood pressure (!) 136/97, pulse 69, temperature 99.7  F (37.6  C), temperature source Bladder, resp. rate 14, height 1.626 m (5' 4\"), weight 98.4 kg (216 lb 14.9 oz), SpO2 94 %.  Rhythm: VVI paced at 80 on bedside monitor    Lungs: Wheezy throughout - on ventilator    Cardiovascular: Distant, S1, S2, RRR, no m/r/g.     Abdomen: Distended/NT. + BS. NJ in place    Extremeties: 2-3+ edema with 1+ palp pedal pulses    Incision(s): Sternal, left chest, and right femoral incisions CDI    CT: N/A    Weight:   Vitals:    01/21/19 0600 01/22/19 0600 01/23/19 0400 01/24/19 0534   Weight: 99.2 kg (218 lb 11.1 oz) 97.8 kg (215 lb 9.8 oz) 96.6 kg (213 lb) 96.6 kg (212 lb 15.4 oz)    01/25/19 0500   Weight: 98.4 kg (216 lb 14.9 oz)            Data:    All labs and imaging reviewed by me.  Labs:   Last Comprehensive Metabolic Panel:  Sodium   Date " Value Ref Range Status   01/25/2019 137 133 - 144 mmol/L Final     Potassium   Date Value Ref Range Status   01/25/2019 4.6 3.4 - 5.3 mmol/L Final     Chloride   Date Value Ref Range Status   01/25/2019 101 94 - 109 mmol/L Final     Carbon Dioxide   Date Value Ref Range Status   01/25/2019 27 20 - 32 mmol/L Final     Anion Gap   Date Value Ref Range Status   01/25/2019 9 3 - 14 mmol/L Final     Glucose   Date Value Ref Range Status   01/25/2019 112 (H) 70 - 99 mg/dL Final     Urea Nitrogen   Date Value Ref Range Status   01/25/2019 90 (H) 7 - 30 mg/dL Final     Creatinine   Date Value Ref Range Status   01/25/2019 1.90 (H) 0.52 - 1.04 mg/dL Final     GFR Estimate   Date Value Ref Range Status   01/25/2019 25 (L) >60 mL/min/[1.73_m2] Final     Comment:     Non  GFR Calc  Starting 12/18/2018, serum creatinine based estimated GFR (eGFR) will be   calculated using the Chronic Kidney Disease Epidemiology Collaboration   (CKD-EPI) equation.       Calcium   Date Value Ref Range Status   01/25/2019 7.8 (L) 8.5 - 10.1 mg/dL Final     Bilirubin Total   Date Value Ref Range Status   01/21/2019 1.0 0.2 - 1.3 mg/dL Final     Alkaline Phosphatase   Date Value Ref Range Status   01/21/2019 73 40 - 150 U/L Final     ALT   Date Value Ref Range Status   01/21/2019 22 0 - 50 U/L Final     AST   Date Value Ref Range Status   01/21/2019 15 0 - 45 U/L Final             Lab Results   Component Value Date    WBC 12.9 01/25/2019     Lab Results   Component Value Date    RBC 2.27 01/25/2019     Lab Results   Component Value Date    HGB 6.6 01/25/2019     Lab Results   Component Value Date    HCT 20.4 01/25/2019     No components found for: MCT  Lab Results   Component Value Date    MCV 90 01/25/2019     Lab Results   Component Value Date    MCH 29.1 01/25/2019     Lab Results   Component Value Date    MCHC 32.4 01/25/2019     Lab Results   Component Value Date    RDW 18.0 01/25/2019     Lab Results   Component Value Date      01/25/2019       CXR 1/25/2019:  IMPRESSION: Endotracheal tube in good position. Pacemaker in the heart  with cardiomegaly. Feeding tube passes into the abdomen. Bilateral  pulmonary infiltrates are unchanged. Elevated right diaphragm is  unchanged. Central venous catheter from the left with the tip in the  proximal superior vena cava.    GRZEGORZ García, CCRN-CSC  CV Surgery  [place] Pager: [phone]  Personal Pager: [phone]

## 2019-01-25 NOTE — PROGRESS NOTES
Tele-ICU cross-cover  DOS: 1/24/2019     FENa returned at 0.3%; urine output didn't respond to torsemide earlier today. Will try gentle fluid challenge.    Jcarlos Wang MD, PhD  Surgical critical care  January 24, 2019, 8:44 PM    Addendum 4:09 AM  - AM Hb 6.6 (7.3); PRBCs x 1 unit ordered.  - Some UOP response to fluid, Cr up at 1.9 (1.75). Follow for effect of transfusion.  - UA was sent which shows pyuria. Can add ceftriaxone for now, follow culture.  RB

## 2019-01-25 NOTE — OP NOTE
General Surgery Operative Note    PREOPERATIVE DIAGNOSIS:  Malnutrition    POSTOPERATIVE DIAGNOSIS:  Same    PROCEDURE:   Procedure(s):  TRACHEOSTOMY  COMBINED PERCUTANEOUS ENDOSCOPIC GASTROSTOMY (PEG) TUBE PLACEMENT    ANESTHESIA:  General.    PREOPERATIVE MEDICATIONS:  Ancef IV.    SURGEON:  Aaron Castañeda MD    ASSISTANT:  WELLINGTON Brown    INDICATIONS:  Inability to take PO nutrition    PROCEDURE:  The patient, following the tracheostomy placement was placed supine.   Upper endoscopy was performed.  Esophagus appeared normal.  Stomach contained minimal bilious material.  We found an area on the abdomen and transilluminated.  We made a small skin nick and cannulated the stomach with an introducer needle.  The guidewire was passed and grasped by the snare.  The wire was then brought out through the mouth and attached to the G-tube.  Tube was then pulled into the stomach and the bulb was followed visually by the endoscope.  The bulb was in good position in the distal body of the stomach.  The tube was cut to length and secured.  Endoscope was removed.    ESTIMATED BLOOD LOSS:  2 mL    PROSTHETICS USED:  20 Fr Gastrostomy Tube    Aaron Castañeda MD

## 2019-01-26 LAB
ANION GAP SERPL CALCULATED.3IONS-SCNC: 9 MMOL/L (ref 3–14)
BACTERIA SPEC CULT: NO GROWTH
BACTERIA SPEC CULT: NORMAL
BUN SERPL-MCNC: 91 MG/DL (ref 7–30)
CALCIUM SERPL-MCNC: 8.2 MG/DL (ref 8.5–10.1)
CHLORIDE SERPL-SCNC: 105 MMOL/L (ref 94–109)
CO2 SERPL-SCNC: 26 MMOL/L (ref 20–32)
CREAT SERPL-MCNC: 1.85 MG/DL (ref 0.52–1.04)
ERYTHROCYTE [DISTWIDTH] IN BLOOD BY AUTOMATED COUNT: 17.5 % (ref 10–15)
GFR SERPL CREATININE-BSD FRML MDRD: 26 ML/MIN/{1.73_M2}
GLUCOSE BLDC GLUCOMTR-MCNC: 103 MG/DL (ref 70–99)
GLUCOSE BLDC GLUCOMTR-MCNC: 103 MG/DL (ref 70–99)
GLUCOSE BLDC GLUCOMTR-MCNC: 105 MG/DL (ref 70–99)
GLUCOSE BLDC GLUCOMTR-MCNC: 108 MG/DL (ref 70–99)
GLUCOSE BLDC GLUCOMTR-MCNC: 109 MG/DL (ref 70–99)
GLUCOSE BLDC GLUCOMTR-MCNC: 95 MG/DL (ref 70–99)
GLUCOSE SERPL-MCNC: 102 MG/DL (ref 70–99)
HCT VFR BLD AUTO: 23.4 % (ref 35–47)
HGB BLD-MCNC: 7.6 G/DL (ref 11.7–15.7)
INR PPP: 1.71 (ref 0.86–1.14)
LMWH PPP CHRO-ACNC: 0.13 IU/ML
LMWH PPP CHRO-ACNC: 0.19 IU/ML
LMWH PPP CHRO-ACNC: 0.31 IU/ML
Lab: NORMAL
MCH RBC QN AUTO: 29 PG (ref 26.5–33)
MCHC RBC AUTO-ENTMCNC: 32.5 G/DL (ref 31.5–36.5)
MCV RBC AUTO: 89 FL (ref 78–100)
PLATELET # BLD AUTO: 322 10E9/L (ref 150–450)
POTASSIUM SERPL-SCNC: 4.8 MMOL/L (ref 3.4–5.3)
RBC # BLD AUTO: 2.62 10E12/L (ref 3.8–5.2)
SODIUM SERPL-SCNC: 140 MMOL/L (ref 133–144)
SPECIMEN SOURCE: NORMAL
SPECIMEN SOURCE: NORMAL
WBC # BLD AUTO: 8.9 10E9/L (ref 4–11)

## 2019-01-26 PROCEDURE — 25000132 ZZH RX MED GY IP 250 OP 250 PS 637: Performed by: NURSE PRACTITIONER

## 2019-01-26 PROCEDURE — 25000132 ZZH RX MED GY IP 250 OP 250 PS 637: Performed by: INTERNAL MEDICINE

## 2019-01-26 PROCEDURE — 25000125 ZZHC RX 250: Performed by: NURSE PRACTITIONER

## 2019-01-26 PROCEDURE — 94003 VENT MGMT INPAT SUBQ DAY: CPT

## 2019-01-26 PROCEDURE — 40000008 ZZH STATISTIC AIRWAY CARE

## 2019-01-26 PROCEDURE — 25000128 H RX IP 250 OP 636: Performed by: NURSE PRACTITIONER

## 2019-01-26 PROCEDURE — 85520 HEPARIN ASSAY: CPT | Performed by: PHYSICIAN ASSISTANT

## 2019-01-26 PROCEDURE — 99291 CRITICAL CARE FIRST HOUR: CPT | Mod: 24 | Performed by: INTERNAL MEDICINE

## 2019-01-26 PROCEDURE — 80048 BASIC METABOLIC PNL TOTAL CA: CPT | Performed by: PHYSICIAN ASSISTANT

## 2019-01-26 PROCEDURE — 25000132 ZZH RX MED GY IP 250 OP 250 PS 637: Performed by: THORACIC SURGERY (CARDIOTHORACIC VASCULAR SURGERY)

## 2019-01-26 PROCEDURE — 85520 HEPARIN ASSAY: CPT | Performed by: INTERNAL MEDICINE

## 2019-01-26 PROCEDURE — 25000128 H RX IP 250 OP 636

## 2019-01-26 PROCEDURE — 25000128 H RX IP 250 OP 636: Performed by: SURGERY

## 2019-01-26 PROCEDURE — 94640 AIRWAY INHALATION TREATMENT: CPT

## 2019-01-26 PROCEDURE — 20000003 ZZH R&B ICU

## 2019-01-26 PROCEDURE — 85027 COMPLETE CBC AUTOMATED: CPT | Performed by: PHYSICIAN ASSISTANT

## 2019-01-26 PROCEDURE — 94640 AIRWAY INHALATION TREATMENT: CPT | Mod: 76

## 2019-01-26 PROCEDURE — 40000275 ZZH STATISTIC RCP TIME EA 10 MIN

## 2019-01-26 PROCEDURE — 00000146 ZZHCL STATISTIC GLUCOSE BY METER IP

## 2019-01-26 PROCEDURE — 85610 PROTHROMBIN TIME: CPT | Performed by: PHYSICIAN ASSISTANT

## 2019-01-26 RX ORDER — DEXTROSE MONOHYDRATE 25 G/50ML
25-50 INJECTION, SOLUTION INTRAVENOUS
Status: DISCONTINUED | OUTPATIENT
Start: 2019-01-26 | End: 2019-01-26

## 2019-01-26 RX ORDER — CLINDAMYCIN PHOSPHATE 900 MG/50ML
900 INJECTION, SOLUTION INTRAVENOUS
Status: DISCONTINUED | OUTPATIENT
Start: 2019-01-26 | End: 2019-01-28

## 2019-01-26 RX ORDER — WARFARIN SODIUM 2.5 MG/1
2.5 TABLET ORAL
Status: COMPLETED | OUTPATIENT
Start: 2019-01-26 | End: 2019-01-26

## 2019-01-26 RX ORDER — NICOTINE POLACRILEX 4 MG
15-30 LOZENGE BUCCAL
Status: DISCONTINUED | OUTPATIENT
Start: 2019-01-26 | End: 2019-01-26

## 2019-01-26 RX ORDER — CLINDAMYCIN PHOSPHATE 900 MG/50ML
900 INJECTION, SOLUTION INTRAVENOUS SEE ADMIN INSTRUCTIONS
Status: DISCONTINUED | OUTPATIENT
Start: 2019-01-26 | End: 2019-01-28

## 2019-01-26 RX ADMIN — METOPROLOL TARTRATE 25 MG: 25 TABLET ORAL at 08:33

## 2019-01-26 RX ADMIN — IPRATROPIUM BROMIDE AND ALBUTEROL SULFATE 3 ML: .5; 2.5 SOLUTION RESPIRATORY (INHALATION) at 07:23

## 2019-01-26 RX ADMIN — MICONAZOLE NITRATE: 2 POWDER TOPICAL at 08:34

## 2019-01-26 RX ADMIN — WARFARIN SODIUM 2.5 MG: 2.5 TABLET ORAL at 18:25

## 2019-01-26 RX ADMIN — HYDROMORPHONE HYDROCHLORIDE 0.5 MG: 1 INJECTION, SOLUTION INTRAMUSCULAR; INTRAVENOUS; SUBCUTANEOUS at 14:34

## 2019-01-26 RX ADMIN — HYDROMORPHONE HYDROCHLORIDE 0.5 MG: 1 INJECTION, SOLUTION INTRAMUSCULAR; INTRAVENOUS; SUBCUTANEOUS at 02:45

## 2019-01-26 RX ADMIN — DEXMEDETOMIDINE 0.3 MCG/KG/HR: 100 INJECTION, SOLUTION, CONCENTRATE INTRAVENOUS at 00:16

## 2019-01-26 RX ADMIN — IPRATROPIUM BROMIDE AND ALBUTEROL SULFATE 3 ML: .5; 2.5 SOLUTION RESPIRATORY (INHALATION) at 19:13

## 2019-01-26 RX ADMIN — NYSTATIN 500000 UNITS: 500000 SUSPENSION ORAL at 05:44

## 2019-01-26 RX ADMIN — NYSTATIN 500000 UNITS: 500000 SUSPENSION ORAL at 12:06

## 2019-01-26 RX ADMIN — Medication 40 MG: at 08:33

## 2019-01-26 RX ADMIN — Medication 5 MG: at 21:14

## 2019-01-26 RX ADMIN — MICONAZOLE NITRATE: 2 POWDER TOPICAL at 20:40

## 2019-01-26 RX ADMIN — ASPIRIN 81 MG 81 MG: 81 TABLET ORAL at 08:33

## 2019-01-26 RX ADMIN — HYDROMORPHONE HYDROCHLORIDE 0.5 MG: 1 INJECTION, SOLUTION INTRAMUSCULAR; INTRAVENOUS; SUBCUTANEOUS at 01:35

## 2019-01-26 RX ADMIN — SODIUM CHLORIDE: 9 INJECTION, SOLUTION INTRAVENOUS at 15:24

## 2019-01-26 RX ADMIN — NOREPINEPHRINE BITARTRATE 0.03 MCG/KG/MIN: 1 INJECTION INTRAVENOUS at 06:04

## 2019-01-26 RX ADMIN — Medication 25 MCG/HR: at 15:26

## 2019-01-26 RX ADMIN — IPRATROPIUM BROMIDE AND ALBUTEROL SULFATE 3 ML: .5; 2.5 SOLUTION RESPIRATORY (INHALATION) at 11:05

## 2019-01-26 RX ADMIN — DEXMEDETOMIDINE 0.3 MCG/KG/HR: 100 INJECTION, SOLUTION, CONCENTRATE INTRAVENOUS at 17:07

## 2019-01-26 RX ADMIN — IPRATROPIUM BROMIDE AND ALBUTEROL SULFATE 3 ML: .5; 2.5 SOLUTION RESPIRATORY (INHALATION) at 15:17

## 2019-01-26 RX ADMIN — NYSTATIN 500000 UNITS: 500000 SUSPENSION ORAL at 00:15

## 2019-01-26 RX ADMIN — NYSTATIN 500000 UNITS: 500000 SUSPENSION ORAL at 18:25

## 2019-01-26 RX ADMIN — CEFTRIAXONE SODIUM 1 G: 1 INJECTION, POWDER, FOR SOLUTION INTRAMUSCULAR; INTRAVENOUS at 03:54

## 2019-01-26 RX ADMIN — QUETIAPINE 25 MG: 25 TABLET ORAL at 21:14

## 2019-01-26 ASSESSMENT — MIFFLIN-ST. JEOR: SCORE: 1467

## 2019-01-26 ASSESSMENT — ACTIVITIES OF DAILY LIVING (ADL)
ADLS_ACUITY_SCORE: 17
ADLS_ACUITY_SCORE: 16
ADLS_ACUITY_SCORE: 17
ADLS_ACUITY_SCORE: 16
ADLS_ACUITY_SCORE: 16
ADLS_ACUITY_SCORE: 15

## 2019-01-26 NOTE — PROGRESS NOTES
"General Surgery Progress Note    On vent, RN reports problems with adapter.   Vitals: Blood pressure 93/84, pulse 72, temperature 99  F (37.2  C), temperature source Bladder, resp. rate 16, height 1.626 m (5' 4\"), weight 99.2 kg (218 lb 11.1 oz), SpO2 98 %.  Temperature Temp  Av.7  F (37.6  C)  Min: 98.8  F (37.1  C)  Max: 100.6  F (38.1  C)   I/O last 3 completed shifts:  In: 51290.63 [I.V.:1317.88; NG/GT:120]  Out: 1357 [Urine:1355; Blood:2]    Abd: soft, nt, nd, +BS. G tube intact and without erythema or drainage.     78 year old female S/P PEG tube placement, 1 Day Post-Op.  - Trim tube and replace with standard adapter  - Ok to start trickle tube feeds and use for meds  - Advance to goal per nutrition as tolerated   - Continue to flush per protocol  - Please call with questions    Corinne Madrid PA-C  [facility]  [phone]  "

## 2019-01-26 NOTE — PROGRESS NOTES
[facility]  Critical Care Service  Progress Note  Date of Service (when I saw the patient): 01/26/2019     Assessment & Plan    Priya Funez is a 78 year old female with PMH of HTN admitted on 1/4/2019 with sudden onset of back pain found to have type A aortic dissection and sent for emergency aortic dissection repair with graft.  She was admitted to ICU for ongoing care.     Interim  events:   1/5: did well overnight.  Epi weaned off; requiring low dose phenylephrine.  Hgb stable.  Extubated in the afternoon.   1/6:poor inspiratory effort and weak cough through the day.     Placed on Bipap overnight.NTG gtt for improved BP control.  Rigors and fever  103  1/7: Fever resolved;    stable respiratory status on HFNC.SvO2 48; getting echocardiogram. EF 55-60%   1/8: Rapid A Fib  1/9: Titrating down FiO2; improved ABG and VBG   1/11-12, hypoxic requiring bipap with little reserve, low UO   fluid resuscitated  with albumin blood and lasix  1/13: re-intubated late on 1/12; significant metabolic acidosis, increased work of breathing   1/18/19: extubated to BIPAP  1/24/19 Thoracentesis. Worsening hypoxic respiratory failure. Reintubation.   1/25/19 Trach and PEG placement      Neuro  1. ICU delirium, improving  2. Acute pain  Plan:  --Limit sedation  --PRN acetaminophen, oxycodone  --scheduled seroquel, 25 mg at HS, decreased over the last several days. PRN available   --Maintain circadian rhythm.  Lights on during the day.  Off at night, minimize cares at night.  OOB during the day.        CV  1. S/p Type A aortic dissection repair 1/4 with Davis Little, and Michaela  2. HTN  3. Afib with RVR, uncontrolled with medications now s/p AV node ablation and pacemaker placement 1/11/19  4. Hypotension, improved     Plan:  --CV Surgery and Vascular surgery primary   --Monitor hemodynamics.  Maintain SBP<130; MAP >65  --Cardiac meds per CV surgery  --Echocardiogram 1/7; EF 50-55%; IVC not dilated, ventricles  small   --Warfarin for Afib  --Off and on levophed     Resp:  1. Acute hypoxemic /hypercapnic respiratory failure; re-intubated 1/12. Extubated 1/18, reintubated 1/24   2. Bilateral pleural effusion   -- CT CAP 1/14 with moderate amount of fluid in fissures, interstitial edema; paralyzed right hemidiaphragm   -- Subsequent CXR showing persistent bilateral pleural effusions R > L.    Plan:   -- CMV:  16/480/5/70%.   -- Wean FiO2 as able   -- scheduled duoneb; albuterol nebs PRN  -- Thoracentesis performed on 1/24. ~ 400 ml removed from right.   -- Daily PSTs    GI/Nutrition  1. Malnutrition, nia/pro deficits  2. diarrhea; presumed colitis. Resolved   Plan:  --GI consulted. Flex sig performed 1/15; diverticulum look fine, mucosa inflamed/congested; favoring colitis. No ischemia noted.  Biopsy showing-- Nonneoplastic colonic mucosa with microscopic lymphoid aggregate, no evidence of microscopic colitis, inflammatory bowel disease or malignancy.  GI recommends Budesenide, 9mg daily. Unable to give via feeding tube. Completed  5 day course oral prednisone    --  PEG placement       Renal  1. IMAN; abdominal aortic dissection involving right renal artery.  Atrophic right kidney with decreased flow. No prior hx.  Baseline creatinine appears to be 0.8;   --Lasix drip stopped this 1/14; patient requiring vasopressors due to hypotension following >24 hours of diuretic drip; worsening renal function; metabolic alkalosis. Was placed on scheduled diuretics thereafter   --Renogram 1/7.  Renal US 1/6 with flow present in both R/L renal arteries. Right kidney atrophic.    Plan:  --Nephrology following, off diuretics     ID  1. Presumptive UTI  --Brittany-op Cefepime and Vancomycin; completed    --Cefepime completed 6 days  --Vanco and millie 1/12--1/17      Plan:  Given worsening respiratory stable, slight elevation in WBC, and low grade fever:  -- Thoracentesis cultures sent- NGTD   -- Blood cultures x2- NGTD   -- Sputum/BAL- NGTD  -- UA  - grossly positively. Started on IV Rocephin. Follow cultures until speciation. Coello replaced 1/24    Endocrine  1. Stress Hyperglycemia  Plan:  --BG check Q 4 hours. Medium resistance sliding scale insulin as needed      Heme:  1. Acute blood loss anemia  2. Thrombocytopenia; resolved   3. Superficial thrombophlebitis in the right basilic vein   4. Right internal jugular thrombus   Plan:  --Monitor hemoglobin. Transfuse to keep > 7.0. Will give 1 PRBC now for Hgb 6.6  --Warfarin for A Fib and right internal jugular thrombus;  Reversed 1/23 for thora and now for trach and PEG. Placed on heparin gtt overnight, but on hold now.   --US lower extremities to assess for DVT; no evidence of DVT      MSK/Skin  1. Sternotomy   Plan:  -- Sternal precautions  -- Wound care per protocol      General cares:  DVT Prophylaxis: continue PCD's; heparin gtt.   GI Prophylaxis: PPI  Restraints: Restraints for medical healing needed: NO  Family update by me today: Yes  Current lines are required for patient management  Access: PIV    Silvia Loco MD     Time Spent on this Encounter   Billing:  I spent 45 minutes bedside and on the inpatient unit today managing the critical care of Priya Funez in relation to the issues listed in this note.    Physical Exam   Temp: 100.2  F (37.9  C) Temp src: Bladder Temp  Min: 98.8  F (37.1  C)  Max: 100.6  F (38.1  C) BP: 92/59 Pulse: 69 Heart Rate: 69 Resp: 16 SpO2: 99 % O2 Device: Mechanical Ventilator    Vitals:    01/24/19 0534 01/25/19 0500 01/26/19 0500   Weight: 96.6 kg (212 lb 15.4 oz) 98.4 kg (216 lb 14.9 oz) 99.2 kg (218 lb 11.1 oz)     GEN: NAD. Following commands   EYES: PERRL,  Anicteric sclera.   HEENT:  Normocephalic, atraumatic, trachea midline  CV: RRR,   PULM/CHEST:  Course anterior breath sounds bilaterally.   GI: soft + bs   : coello catheter in place, urine yellow and clear  EXTREMITIES: + peripheral edema moving all extremities, peripheral pulses intact  NEURO: carroll follows  commands   SKIN: warm and dry  Imaging personally reviewed:  CXR   EC% V-paced     Data   Recent Labs   Lab 19  0405 19  0306 19  1730 19  1403 19  0455  19  0415  19  0430   WBC 8.9 12.9*  --  13.8* 13.2*  --  11.9*   < > 14.8*   HGB 7.6* 6.6*  --  7.3* 7.5*  --  7.6*   < > 8.0*   MCV 89 90  --  90 92  --  91   < > 89    425  --  407 375  --  336   < > 346   INR 1.71* 1.72*  --   --  1.57*  --  3.37*   < > 2.16*    137 141  --  138  --  139   < > 138   POTASSIUM 4.8 4.6 4.9  --  4.1   < > 3.3*   < > 3.4   CHLORIDE 105 101 104  --  101  --  102   < > 101   CO2 26 27 27  --  27  --  29   < > 30   BUN 91* 90* 83*  --  76*  --  74*   < > 72*   CR 1.85* 1.90* 1.75*  --  1.47*  --  1.30*   < > 1.27*   ANIONGAP 9 9 10  --  10  --  8   < > 7   BESS 8.2* 7.8* 8.0*  --  8.5  --  8.8   < > 8.3*   * 112* 148*  --  154*  --  140*   < > 112*   ALBUMIN  --   --  3.1*  --   --   --   --   --  3.6   PROTTOTAL  --   --   --   --   --   --  6.2*  --  5.8*   BILITOTAL  --   --   --   --   --   --   --   --  1.0   ALKPHOS  --   --   --   --   --   --   --   --  73   ALT  --   --   --   --   --   --   --   --  22   AST  --   --   --   --   --   --   --   --  15    < > = values in this interval not displayed.

## 2019-01-26 NOTE — PROGRESS NOTES
Date of Admission: 1/4/2019     Date of Intubation (most recent): 01/24/2019   Trach- 01/25/2019   Reason for Mechanical Ventilation: Respiratory Failure- Failed extubation.    Number of Days on Mechanical Ventilation: 2   Met Criteria for Pressure Support Trial: No   Reason for No Pressure Support Trial: Per MD    Ventilation Mode: CMV/AC  (Continuous Mandatory Ventilation/ Assist Control)  FiO2 (%): 40 %  Rate Set (breaths/minute): 16 breaths/min  Tidal Volume Set (mL): 480 mL  PEEP (cm H2O): 5 cmH2O  Oxygen Concentration (%): 40 %  Resp: 15    Recent Labs   Lab 01/24/19  0837 01/20/19  2000 01/19/19  1028   PH 7.50* 7.49* 7.49*   PCO2 33* 35 34*   PO2 33* 134* 117*   HCO3 26 27 26   O2PER  --  40% 60%     Plan: continue with full vent support and assess for daily weaning trial.  RT will continue to follow.    Arturo Harley

## 2019-01-26 NOTE — PLAN OF CARE
Patient resting comfortably with new trach on current vent settings. Patient 100% paced with low blood pressure when she's sleeping. Levo restarted at minimum dose. Patient alert and oriented. New PEG placed, dressing with minimal drainage. Heparin restarted. Adequate urine output via coello catheter. Patient has reddened andriy area, powder applied. Patient updated on POC, will continue to monitor.

## 2019-01-26 NOTE — PROGRESS NOTES
THORACIC SURGERY    Tracheostomy looks good  No bleeding    TARUN MADRID MD [facility] ONCOLOGY THORACIC SURGERY  CELL:  [phone]  OFFICE: [phone]

## 2019-01-26 NOTE — PROGRESS NOTES
[facility]  Cardiovascular and Thoracic Surgery Daily Note          Assessment and Plan:   POD#22 s/p Left groin cutdown with exposure of the femoral vessels with arterial cannulation for cardiopulmonary bypass. Right groin cutdown with exposure of the femoral vessels with the right femoral artery ultimately used for diagnostic angiography. 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 Visceral angiography. Repair of the bilateral femoral vessels by Dr. Jose Winslow.     CVS:   Blood pressure WNL today, HR 80/ 100% VVI paced.  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 On oral amiodarone. Heparin drip for AFib. Will restart coumadin today.  Of note has superficial right arm thrombophlebits and right internal jugular thrombus 2/2 previous central line.  Pulm:  Extubated per protocol and reintubated on POD #8 for resp distress. Extubated POD #14 and reintubated POD #20. Trach POD#21.  CT chest on 1/14 demonstrates RLL compressive atelectasis 2/2 elevated hemidiaphragm (present preoperatively). CXR/CT chest with fluid in fissures.   POD #7 left US guided thoracentesis with removal of 150 mL fluid.   POD #20 right US guided thoracentesis with removal of 400 mL of fluid.  CXR today demonstrates persistent elevated right hemidiaphragm, and bilateral pulmonary infiltrates.   Neuro:  Grossly intact, weakly follows commands. Intermittently confused and anxious. Mixed delirium. On seroquel.  Pain and sedation well managed with current regimen precedex and fentanyl drips  Deconditioned - working with PT/OT/speech  Renal:   IMAN -BL Creat ~0.8, 1.9 today, stable  Renal US on 1/6 with patent flow in bilateral renal arteries. Right kidney atrophic.  Renal on board - appreciate. Appreciate.   Fluid overload with significant edema.  BMP in  AM  GI:  Malnourished. Receiving tube feedings via NJ feeding tube. Will start feeds via PEG today.  Five day course of oral prednisone completed for suspected microscopic colitis, flex sig biopsy negative. Loose stools improving.  CT abdomen on 1/14 did not demonstrate acute pathology.  Dysphagia 2/2 deconditioning.  1/12 and 1/14 negative for c. Diff  On PPI  :  Barrientos discontinued on 1/21, replaced on 1/24 when reintubated  Endo:   Preop A1C 5.3; managed on sliding scale insulin. Goal BG <180  FEN:   Replace lytes as needed; Continue tube feedings  ID:   + UA on 1/24 - Ceftriaxone started  Sputum + candida,on nystatin; BC neg.  Heme:   Acute blood loss anemia and thrombocytopenia  Tubes/Drains:  Left internal jugular central line  Feeding tube  Barrientos  Proph:   BB when able, statin, ASA, coumadin (held for now), PCD, PPI  Dispo:   Continue in ICU with close monitoring  Appreciate consulting services          Interval History:   PEG/trach yesterday. Low dose norepi briefly required overnight, BP normal this AM.  No acute issues.          Medications:       aspirin  81 mg Per Feeding Tube Daily     cefTRIAXone  1 g Intravenous Q24H     clindamycin  900 mg Intravenous Pre-Op/Pre-procedure x 1 dose     clindamycin  900 mg Intravenous See Admin Instructions     influenza Vac Split High-Dose  0.5 mL Intramuscular Prior to discharge     insulin aspart  1-6 Units Subcutaneous Q4H     ipratropium - albuterol 0.5 mg/2.5 mg/3 mL  3 mL Nebulization 4x daily     lidocaine (PF)  10 mL Subcutaneous Once     melatonin  5 mg Sublingual At Bedtime     metoprolol tartrate  25 mg Per Feeding Tube BID     miconazole   Topical BID     nystatin  500,000 Units Oral 4x Daily     pantoprazole  40 mg Oral QAM AC     potassium chloride  20 mEq Oral Daily     QUEtiapine  25 mg Oral At Bedtime     sodium chloride (PF)  10 mL Intracatheter Q8H     acetaminophen, albuterol, IV fluid REPLACEMENT ONLY, glucose **OR** dextrose **OR** glucagon,  "heparin lock flush, hydrALAZINE, hydrogen peroxide, HYDROmorphone, lidocaine 4%, lidocaine (buffered or not buffered), lidocaine (buffered or not buffered), magnesium sulfate, magnesium sulfate, - MEDICATION INSTRUCTIONS -, - MEDICATION INSTRUCTIONS -, methocarbamol, naloxone, ondansetron **OR** ondansetron, oxyCODONE IR, potassium chloride, potassium chloride with lidocaine, potassium chloride, potassium chloride, potassium chloride, QUEtiapine, sodium chloride (PF), sodium chloride (PF), sodium chloride (PF), [COMPLETED] sodium phosphate **FOLLOWED BY** sodium phosphate, sodium phosphate, sodium phosphate, sodium phosphate, sodium phosphate, Warfarin Therapy Reminder          Physical Exam:   Vitals were reviewed  Blood pressure 97/57, pulse 70, temperature 100  F (37.8  C), resp. rate 16, height 1.626 m (5' 4\"), weight 99.2 kg (218 lb 11.1 oz), SpO2 98 %.  Rhythm: VVI paced at 80 on bedside monitor     Lungs: Wheezy throughout - on ventilator     Cardiovascular: Distant, S1, S2, RRR, no m/r/g.      Abdomen: Distended/NT. + BS. NJ in place     Extremeties: 2-3+ edema with 1+ palp pedal pulses     Incision(s): Sternal, left chest, and right femoral incisions CDI        Weight:   Vitals:    01/22/19 0600 01/23/19 0400 01/24/19 0534 01/25/19 0500   Weight: 97.8 kg (215 lb 9.8 oz) 96.6 kg (213 lb) 96.6 kg (212 lb 15.4 oz) 98.4 kg (216 lb 14.9 oz)    01/26/19 0500   Weight: 99.2 kg (218 lb 11.1 oz)            Data:   Labs:   Lab Results   Component Value Date    WBC 8.9 01/26/2019     Lab Results   Component Value Date    RBC 2.62 01/26/2019     Lab Results   Component Value Date    HGB 7.6 01/26/2019     Lab Results   Component Value Date    HCT 23.4 01/26/2019     No components found for: MCT  Lab Results   Component Value Date    MCV 89 01/26/2019     Lab Results   Component Value Date    MCH 29.0 01/26/2019     Lab Results   Component Value Date    MCHC 32.5 01/26/2019     Lab Results   Component Value Date    RDW " 17.5 01/26/2019     Lab Results   Component Value Date     01/26/2019       Last Basic Metabolic Panel:  Lab Results   Component Value Date     01/26/2019      Lab Results   Component Value Date    POTASSIUM 4.8 01/26/2019     Lab Results   Component Value Date    CHLORIDE 105 01/26/2019     Lab Results   Component Value Date    BESS 8.2 01/26/2019     Lab Results   Component Value Date    CO2 26 01/26/2019     Lab Results   Component Value Date    BUN 91 01/26/2019     Lab Results   Component Value Date    CR 1.85 01/26/2019     Lab Results   Component Value Date     01/26/2019       CXR: no new imaging    Bobby Wharton MD  Fellow, CT Surgery

## 2019-01-26 NOTE — PROGRESS NOTES
Renal Medicine Progress Note                                Priya Funez MRN# [id number]   Age: 78 year old YOB: 1940   Date of Admission: 1/4/2019 Hospital LOS: 22                  Assessment/Plan:     Following for ARF presumed ATN post emergent type A dissection repair    1.  Baseline creatinine < 1.0 based on presenting creatinine  2.  ARF   -ATN   -non oliguric   -FENA 0.3 % 01/24/19  3.  Increased extravascular volume      Continue to follow off diuretic  Follow labs and UO.      Interval History:     Follows.  Trached.  IO and UO reviewed.        ROS:     Intubated: ROS unable    Medications and Allergies:     Reviewed    Physical Exam:     Vitals were reviewed  Patient Vitals for the past 12 hrs:   BP Temp Temp src Pulse Heart Rate Resp SpO2 Weight   01/26/19 0815 93/84 -- -- -- -- -- -- --   01/26/19 0800 124/90 99  F (37.2  C) Bladder 72 70 16 98 % --   01/26/19 0730 130/86 -- -- -- -- -- -- --   01/26/19 0700 121/83 99  F (37.2  C) -- 70 70 -- 99 % --   01/26/19 0645 -- 99  F (37.2  C) -- -- 69 -- 98 % --   01/26/19 0630 107/76 98.8  F (37.1  C) -- -- 69 -- 98 % --   01/26/19 0615 -- 99  F (37.2  C) -- -- 69 -- 98 % --   01/26/19 0600 (!) 71/46 98.8  F (37.1  C) -- -- 69 -- 98 % --   01/26/19 0545 -- 99  F (37.2  C) -- -- 70 -- 99 % --   01/26/19 0530 98/60 99  F (37.2  C) -- -- 70 -- 99 % --   01/26/19 0515 -- 99  F (37.2  C) -- -- 69 -- 99 % --   01/26/19 0500 104/68 99.1  F (37.3  C) -- -- 69 -- 99 % 99.2 kg (218 lb 11.1 oz)   01/26/19 0445 -- 99.3  F (37.4  C) -- -- 70 -- 99 % --   01/26/19 0430 104/66 99.5  F (37.5  C) -- -- 70 -- 99 % --   01/26/19 0415 -- 99.7  F (37.6  C) -- -- 69 -- 98 % --   01/26/19 0414 -- -- -- -- -- -- 96 % --   01/26/19 0400 115/72 -- Bladder -- 69 -- 95 % --   01/26/19 0345 -- 100  F (37.8  C) -- -- -- -- -- --   01/26/19 0330 (!) 73/44 100  F (37.8  C) -- -- 69 -- 96 % --   01/26/19 0315 -- 100  F (37.8  C) -- -- 69 -- 97 % --   01/26/19 0300 108/71  100.2  F (37.9  C) -- -- 69 -- 98 % --   01/26/19 0245 -- -- -- -- 69 -- 98 % --   01/26/19 0230 96/57 100.2  F (37.9  C) -- -- 69 -- 98 % --   01/26/19 0215 -- -- -- -- 69 -- 96 % --   01/26/19 0200 (!) 86/53 100.6  F (38.1  C) -- -- 68 -- 96 % --   01/26/19 0145 -- 100.6  F (38.1  C) -- -- 69 -- 97 % --   01/26/19 0130 129/77 100.4  F (38  C) -- -- 72 -- 97 % --   01/26/19 0115 (!) 79/51 100.4  F (38  C) -- -- 70 -- 98 % --   01/26/19 0100 95/82 100.4  F (38  C) -- -- -- -- 97 % --   01/26/19 0045 (!) 84/56 -- -- -- 70 -- 99 % --   01/26/19 0030 112/54 100.2  F (37.9  C) -- -- -- -- 99 % --   01/26/19 0015 (!) 79/66 -- -- -- 69 -- 96 % --   01/26/19 0000 96/62 100.2  F (37.9  C) Bladder -- -- -- -- --   01/25/19 2345 (!) 83/53 100.2  F (37.9  C) -- -- 69 -- 96 % --   01/25/19 2330 (!) 83/53 -- -- -- -- -- 96 % --   01/25/19 2329 -- -- -- -- -- -- 96 % --   01/25/19 2315 105/67 100.2  F (37.9  C) -- -- 70 -- 93 % --   01/25/19 2300 (!) 86/44 -- -- -- 70 -- 98 % --   01/25/19 2245 94/72 100  F (37.8  C) -- -- 69 -- 97 % --   01/25/19 2230 111/60 100  F (37.8  C) -- -- 71 -- 96 % --   01/25/19 2215 108/73 100  F (37.8  C) -- -- 70 -- 97 % --   01/25/19 2200 98/57 100  F (37.8  C) -- -- 69 -- 95 % --   01/25/19 2145 97/59 100  F (37.8  C) -- -- 69 -- 96 % --   01/25/19 2130 119/77 -- -- -- -- -- -- --   01/25/19 2115 102/55 100.2  F (37.9  C) -- -- 69 -- 95 % --     I/O last 3 completed shifts:  In: 76704.63 [I.V.:1317.88; NG/GT:120]  Out: 1357 [Urine:1355; Blood:2]    Vitals:    01/22/19 0600 01/23/19 0400 01/24/19 0534 01/25/19 0500   Weight: 97.8 kg (215 lb 9.8 oz) 96.6 kg (213 lb) 96.6 kg (212 lb 15.4 oz) 98.4 kg (216 lb 14.9 oz)    01/26/19 0500   Weight: 99.2 kg (218 lb 11.1 oz)         GENERAL:  Intubated; follows  HEENT: ETT  RESP:  clear anteriorly  CV: RRR, normal S1 S2  ABDOMEN: soft, nontender, no HSM or masses and bowel sounds normal  :  coello catheter  SKIN: clear without significant rashes or  lesions  EXT: 2 plus edema    Data:     Recent Labs   Lab 01/26/19  0405 01/25/19  0306 01/24/19  1730 01/24/19  0455    137 141 138   POTASSIUM 4.8 4.6 4.9 4.1   CHLORIDE 105 101 104 101   CO2 26 27 27 27   ANIONGAP 9 9 10 10   * 112* 148* 154*   BUN 91* 90* 83* 76*   CR 1.85* 1.90* 1.75* 1.47*   GFRESTIMATED 26* 25* 28* 34*   GFRESTBLACK 30* 29* 32* 40*   BESS 8.2* 7.8* 8.0* 8.5         Recent Labs   Lab 01/26/19  0405 01/25/19  0306 01/24/19  1730 01/24/19  0455 01/23/19  0415 01/22/19  0435 01/21/19  0430 01/20/19  0400   CR 1.85* 1.90* 1.75* 1.47* 1.30* 1.28* 1.27* 1.56*     Recent Labs   Lab 01/24/19  1730 01/21/19  0430   ALBUMIN 3.1* 3.6     Recent Labs   Lab 01/26/19  0405 01/25/19  0306 01/24/19  1730 01/24/19  1403 01/24/19  0455 01/23/19  2059 01/21/19  0430   PHOS  --   --  4.0  --  4.0 2.7  --  3.5   HGB 7.6* 6.6*  --  7.3* 7.5*  --    < > 8.0*    < > = values in this interval not displayed.     Recent Labs   Lab 01/24/19  1825   FENAPR 0.3       G Narinder Brown    [facility] - Nephrology  [phone]

## 2019-01-27 ENCOUNTER — APPOINTMENT (OUTPATIENT)
Dept: PHYSICAL THERAPY | Facility: CLINIC | Age: 79
DRG: 003 | End: 2019-01-27
Payer: COMMERCIAL

## 2019-01-27 ENCOUNTER — APPOINTMENT (OUTPATIENT)
Dept: GENERAL RADIOLOGY | Facility: CLINIC | Age: 79
DRG: 003 | End: 2019-01-27
Payer: COMMERCIAL

## 2019-01-27 ENCOUNTER — APPOINTMENT (OUTPATIENT)
Dept: OCCUPATIONAL THERAPY | Facility: CLINIC | Age: 79
DRG: 003 | End: 2019-01-27
Payer: COMMERCIAL

## 2019-01-27 LAB
ANION GAP SERPL CALCULATED.3IONS-SCNC: 8 MMOL/L (ref 3–14)
BASOPHILS # BLD AUTO: 0 10E9/L (ref 0–0.2)
BASOPHILS NFR BLD AUTO: 0.3 %
BUN SERPL-MCNC: 91 MG/DL (ref 7–30)
CALCIUM SERPL-MCNC: 8.2 MG/DL (ref 8.5–10.1)
CHLORIDE SERPL-SCNC: 106 MMOL/L (ref 94–109)
CO2 SERPL-SCNC: 28 MMOL/L (ref 20–32)
CREAT SERPL-MCNC: 1.73 MG/DL (ref 0.52–1.04)
DIFFERENTIAL METHOD BLD: ABNORMAL
EOSINOPHIL # BLD AUTO: 0.1 10E9/L (ref 0–0.7)
EOSINOPHIL NFR BLD AUTO: 1.8 %
ERYTHROCYTE [DISTWIDTH] IN BLOOD BY AUTOMATED COUNT: 17.4 % (ref 10–15)
GFR SERPL CREATININE-BSD FRML MDRD: 28 ML/MIN/{1.73_M2}
GLUCOSE BLDC GLUCOMTR-MCNC: 103 MG/DL (ref 70–99)
GLUCOSE BLDC GLUCOMTR-MCNC: 129 MG/DL (ref 70–99)
GLUCOSE BLDC GLUCOMTR-MCNC: 131 MG/DL (ref 70–99)
GLUCOSE BLDC GLUCOMTR-MCNC: 145 MG/DL (ref 70–99)
GLUCOSE SERPL-MCNC: 119 MG/DL (ref 70–99)
HCT VFR BLD AUTO: 22.8 % (ref 35–47)
HGB BLD-MCNC: 7.4 G/DL (ref 11.7–15.7)
IMM GRANULOCYTES # BLD: 0 10E9/L (ref 0–0.4)
IMM GRANULOCYTES NFR BLD: 0.5 %
INR PPP: 1.68 (ref 0.86–1.14)
LMWH PPP CHRO-ACNC: 0.15 IU/ML
LYMPHOCYTES # BLD AUTO: 0.6 10E9/L (ref 0.8–5.3)
LYMPHOCYTES NFR BLD AUTO: 9.4 %
MCH RBC QN AUTO: 29.1 PG (ref 26.5–33)
MCHC RBC AUTO-ENTMCNC: 32.5 G/DL (ref 31.5–36.5)
MCV RBC AUTO: 90 FL (ref 78–100)
MONOCYTES # BLD AUTO: 0.4 10E9/L (ref 0–1.3)
MONOCYTES NFR BLD AUTO: 6.2 %
NEUTROPHILS # BLD AUTO: 5.4 10E9/L (ref 1.6–8.3)
NEUTROPHILS NFR BLD AUTO: 81.8 %
NRBC # BLD AUTO: 0 10*3/UL
NRBC BLD AUTO-RTO: 0 /100
PLATELET # BLD AUTO: 298 10E9/L (ref 150–450)
PLATELET # BLD AUTO: 299 10E9/L (ref 150–450)
POTASSIUM SERPL-SCNC: 4 MMOL/L (ref 3.4–5.3)
RBC # BLD AUTO: 2.54 10E12/L (ref 3.8–5.2)
SODIUM SERPL-SCNC: 142 MMOL/L (ref 133–144)
WBC # BLD AUTO: 6.6 10E9/L (ref 4–11)

## 2019-01-27 PROCEDURE — 25000132 ZZH RX MED GY IP 250 OP 250 PS 637: Performed by: INTERNAL MEDICINE

## 2019-01-27 PROCEDURE — 27210437 ZZH NUTRITION PRODUCT SEMIELEM INTERMED LITER

## 2019-01-27 PROCEDURE — 94640 AIRWAY INHALATION TREATMENT: CPT | Mod: 76

## 2019-01-27 PROCEDURE — 94003 VENT MGMT INPAT SUBQ DAY: CPT

## 2019-01-27 PROCEDURE — 97535 SELF CARE MNGMENT TRAINING: CPT | Mod: GO

## 2019-01-27 PROCEDURE — 25000128 H RX IP 250 OP 636: Performed by: THORACIC SURGERY (CARDIOTHORACIC VASCULAR SURGERY)

## 2019-01-27 PROCEDURE — 97167 OT EVAL HIGH COMPLEX 60 MIN: CPT | Mod: GO

## 2019-01-27 PROCEDURE — 40000275 ZZH STATISTIC RCP TIME EA 10 MIN

## 2019-01-27 PROCEDURE — 25000128 H RX IP 250 OP 636: Performed by: SURGERY

## 2019-01-27 PROCEDURE — 00000146 ZZHCL STATISTIC GLUCOSE BY METER IP

## 2019-01-27 PROCEDURE — 25000125 ZZHC RX 250: Performed by: NURSE PRACTITIONER

## 2019-01-27 PROCEDURE — 94640 AIRWAY INHALATION TREATMENT: CPT

## 2019-01-27 PROCEDURE — 85049 AUTOMATED PLATELET COUNT: CPT | Performed by: PHYSICIAN ASSISTANT

## 2019-01-27 PROCEDURE — 40000008 ZZH STATISTIC AIRWAY CARE

## 2019-01-27 PROCEDURE — 97110 THERAPEUTIC EXERCISES: CPT | Mod: GP | Performed by: PHYSICAL THERAPIST

## 2019-01-27 PROCEDURE — 85520 HEPARIN ASSAY: CPT | Performed by: INTERNAL MEDICINE

## 2019-01-27 PROCEDURE — 25000132 ZZH RX MED GY IP 250 OP 250 PS 637: Performed by: THORACIC SURGERY (CARDIOTHORACIC VASCULAR SURGERY)

## 2019-01-27 PROCEDURE — 25000128 H RX IP 250 OP 636: Performed by: NURSE PRACTITIONER

## 2019-01-27 PROCEDURE — 25000132 ZZH RX MED GY IP 250 OP 250 PS 637: Performed by: NURSE PRACTITIONER

## 2019-01-27 PROCEDURE — 85610 PROTHROMBIN TIME: CPT | Performed by: INTERNAL MEDICINE

## 2019-01-27 PROCEDURE — 71045 X-RAY EXAM CHEST 1 VIEW: CPT

## 2019-01-27 PROCEDURE — 80048 BASIC METABOLIC PNL TOTAL CA: CPT | Performed by: INTERNAL MEDICINE

## 2019-01-27 PROCEDURE — 99291 CRITICAL CARE FIRST HOUR: CPT | Mod: 24 | Performed by: INTERNAL MEDICINE

## 2019-01-27 PROCEDURE — 20000003 ZZH R&B ICU

## 2019-01-27 PROCEDURE — 40000193 ZZH STATISTIC PT WARD VISIT: Performed by: PHYSICAL THERAPIST

## 2019-01-27 PROCEDURE — 85025 COMPLETE CBC W/AUTO DIFF WBC: CPT | Performed by: INTERNAL MEDICINE

## 2019-01-27 PROCEDURE — 40000133 ZZH STATISTIC OT WARD VISIT

## 2019-01-27 RX ORDER — WARFARIN SODIUM 3 MG/1
3 TABLET ORAL
Status: COMPLETED | OUTPATIENT
Start: 2019-01-27 | End: 2019-01-27

## 2019-01-27 RX ADMIN — NYSTATIN 500000 UNITS: 500000 SUSPENSION ORAL at 00:12

## 2019-01-27 RX ADMIN — HYDROMORPHONE HYDROCHLORIDE 0.5 MG: 1 INJECTION, SOLUTION INTRAMUSCULAR; INTRAVENOUS; SUBCUTANEOUS at 01:15

## 2019-01-27 RX ADMIN — ASPIRIN 81 MG 81 MG: 81 TABLET ORAL at 08:40

## 2019-01-27 RX ADMIN — POTASSIUM CHLORIDE 20 MEQ: 1.5 POWDER, FOR SOLUTION ORAL at 08:39

## 2019-01-27 RX ADMIN — Medication 5 MG: at 21:06

## 2019-01-27 RX ADMIN — HEPARIN SODIUM 1400 UNITS/HR: 10000 INJECTION, SOLUTION INTRAVENOUS at 09:20

## 2019-01-27 RX ADMIN — CEFTRIAXONE SODIUM 1 G: 1 INJECTION, POWDER, FOR SOLUTION INTRAMUSCULAR; INTRAVENOUS at 03:18

## 2019-01-27 RX ADMIN — DEXMEDETOMIDINE 0.3 MCG/KG/HR: 100 INJECTION, SOLUTION, CONCENTRATE INTRAVENOUS at 08:17

## 2019-01-27 RX ADMIN — WARFARIN SODIUM 3 MG: 3 TABLET ORAL at 18:41

## 2019-01-27 RX ADMIN — QUETIAPINE 25 MG: 25 TABLET ORAL at 21:06

## 2019-01-27 RX ADMIN — HYDROMORPHONE HYDROCHLORIDE 0.5 MG: 1 INJECTION, SOLUTION INTRAMUSCULAR; INTRAVENOUS; SUBCUTANEOUS at 23:52

## 2019-01-27 RX ADMIN — METOPROLOL TARTRATE 25 MG: 25 TABLET ORAL at 08:40

## 2019-01-27 RX ADMIN — HYDROMORPHONE HYDROCHLORIDE 0.5 MG: 1 INJECTION, SOLUTION INTRAMUSCULAR; INTRAVENOUS; SUBCUTANEOUS at 16:01

## 2019-01-27 RX ADMIN — NYSTATIN 500000 UNITS: 500000 SUSPENSION ORAL at 16:02

## 2019-01-27 RX ADMIN — DEXMEDETOMIDINE 0.3 MCG/KG/HR: 100 INJECTION, SOLUTION, CONCENTRATE INTRAVENOUS at 22:37

## 2019-01-27 RX ADMIN — INSULIN ASPART 1 UNITS: 100 INJECTION, SOLUTION INTRAVENOUS; SUBCUTANEOUS at 23:58

## 2019-01-27 RX ADMIN — MICONAZOLE NITRATE: 2 POWDER TOPICAL at 09:08

## 2019-01-27 RX ADMIN — IPRATROPIUM BROMIDE AND ALBUTEROL SULFATE 3 ML: .5; 2.5 SOLUTION RESPIRATORY (INHALATION) at 20:11

## 2019-01-27 RX ADMIN — INSULIN ASPART 1 UNITS: 100 INJECTION, SOLUTION INTRAVENOUS; SUBCUTANEOUS at 20:39

## 2019-01-27 RX ADMIN — NYSTATIN 500000 UNITS: 500000 SUSPENSION ORAL at 23:52

## 2019-01-27 RX ADMIN — NYSTATIN 500000 UNITS: 500000 SUSPENSION ORAL at 05:42

## 2019-01-27 RX ADMIN — IPRATROPIUM BROMIDE AND ALBUTEROL SULFATE 3 ML: .5; 2.5 SOLUTION RESPIRATORY (INHALATION) at 07:32

## 2019-01-27 RX ADMIN — IPRATROPIUM BROMIDE AND ALBUTEROL SULFATE 3 ML: .5; 2.5 SOLUTION RESPIRATORY (INHALATION) at 10:39

## 2019-01-27 RX ADMIN — MICONAZOLE NITRATE: 2 POWDER TOPICAL at 20:40

## 2019-01-27 RX ADMIN — Medication 40 MG: at 08:39

## 2019-01-27 RX ADMIN — HYDROMORPHONE HYDROCHLORIDE 0.5 MG: 1 INJECTION, SOLUTION INTRAMUSCULAR; INTRAVENOUS; SUBCUTANEOUS at 08:38

## 2019-01-27 RX ADMIN — IPRATROPIUM BROMIDE AND ALBUTEROL SULFATE 3 ML: .5; 2.5 SOLUTION RESPIRATORY (INHALATION) at 14:41

## 2019-01-27 ASSESSMENT — ACTIVITIES OF DAILY LIVING (ADL)
ADLS_ACUITY_SCORE: 16
ADLS_ACUITY_SCORE: 17
ADLS_ACUITY_SCORE: 16
PREVIOUS_RESPONSIBILITIES: MEAL PREP;HOUSEKEEPING;LAUNDRY;YARDWORK;MEDICATION MANAGEMENT;DRIVING
ADLS_ACUITY_SCORE: 16

## 2019-01-27 ASSESSMENT — MIFFLIN-ST. JEOR: SCORE: 1476

## 2019-01-27 NOTE — PLAN OF CARE
[id number]: Follows commands. Tele 100% V-paced. Tolerating full vent support. Dex infusing, RASS 0. Heparin gtt infusing. Tolerating TF at 10 mL/hr. Daughter at bedside at beginning of shift, updated.  at bedside at this time, updated. Continue to monitor.

## 2019-01-27 NOTE — PLAN OF CARE
Patient resting comfortably with new trach on current vent settings. Patient 100% paced adequate blood pressures. Patient alert and oriented. Patient tolerating tube feedings well. Adequate urine output via coello catheter. Patient has reddened andriy area, powder applied. Patient updated on POC, will continue to monitor.

## 2019-01-27 NOTE — PROGRESS NOTES
Date of Intubation (most recent): 01/24/2019   Trach- 01/25/2019   Reason for Mechanical Ventilation: Respiratory Failure- Failed extubation.    Number of Days on Mechanical Ventilation: 3   Met Criteria for Pressure Support Trial: No   Reason for No Pressure Support Trial: Per MD    Ventilation Mode: CMV/AC  (Continuous Mandatory Ventilation/ Assist Control)  FiO2 (%): 40 %  Rate Set (breaths/minute): 16 breaths/min  Tidal Volume Set (mL): 480 mL  PEEP (cm H2O): 5 cmH2O  Oxygen Concentration (%): 40 %  Resp: 16    Recent Labs   Lab 01/24/19  0837 01/20/19 2000   PH 7.50* 7.49*   PCO2 33* 35   PO2 33* 134*   HCO3 26 27   O2PER  --  40%   plan: continue full vent support tonight , assess for weaning readiness daily

## 2019-01-27 NOTE — PLAN OF CARE
Discharge Planner PT   Patient plan for discharge: None stated  Current status: Pt politely declining EOB/up to chair stating she just sat EOB with OT, motivated to participate in strengthening, tolerated BLE supine ex x20 reps with VSS.   Barriers to return to prior living situation: Level of assist, decreased functional activity tolerance, fatigue, impaired balance, weakness, unable to stand/ambulate  Recommendations for discharge: ARC  Rationale for recommendations: Pt will benefit from intensive skilled PT services to progress functional activity tolerance, strength, balance, safety and IND with functional mobility.         Entered by: Deisy Maguire 01/27/2019 3:31 PM

## 2019-01-27 NOTE — PROGRESS NOTES
[facility]  Critical Care Service  Progress Note  Date of Service (when I saw the patient): 01/27/2019     Assessment & Plan    Priya Funez is a 78 year old female with PMH of HTN admitted on 1/4/2019 with sudden onset of back pain found to have type A aortic dissection and sent for emergency aortic dissection repair with graft.  She was admitted to ICU for ongoing care.     Interim  events:   1/5: did well overnight.  Epi weaned off; requiring low dose phenylephrine.  Hgb stable.  Extubated in the afternoon.   1/6:poor inspiratory effort and weak cough through the day.     Placed on Bipap overnight.NTG gtt for improved BP control.  Rigors and fever  103  1/7: Fever resolved;    stable respiratory status on HFNC.SvO2 48; getting echocardiogram. EF 55-60%   1/8: Rapid A Fib  1/9: Titrating down FiO2; improved ABG and VBG   1/11-12, hypoxic requiring bipap with little reserve, low UO   fluid resuscitated  with albumin blood and lasix  1/13: re-intubated late on 1/12; significant metabolic acidosis, increased work of breathing   1/18/19: extubated to BIPAP  1/24/19 Thoracentesis. Worsening hypoxic respiratory failure. Reintubation.   1/25/19 Trach and PEG placement      Neuro  1. ICU delirium, improving  2. Acute pain  Plan:  --Limit sedation  --scheduled seroquel, 25 mg at HS, decreased over the last several days. PRN available        CV  1. S/p Repair of Type A aortic dissection repair 1/4 with Davis Little, and Michaela  2. HTN  3. Afib with RVR, uncontrolled with medications now s/p AV node ablation and pacemaker placement 1/11/19  4. Hypotension, improved, off levophed    Plan:  --CV Surgery and Vascular surgery primary   --Monitor hemodynamics.  Maintain SBP<130; MAP >65  --Echocardiogram 1/7; EF 50-55%; IVC not dilated, ventricles small   --Warfarin for Afib     Resp:  1. Acute hypoxemic /hypercapnic respiratory failure; re-intubated 1/12. Extubated 1/18, reintubated 1/24, trach and  PEG'ed 1/25  2. Bilateral pleural effusion   -- CT CAP 1/14 with moderate amount of fluid in fissures, interstitial edema; paralyzed right hemidiaphragm   -- Subsequent CXR showing persistent bilateral pleural effusions R > L.    Plan:   -- CMV:  16/480/5/70%.   -- Wean FiO2 as able   -- scheduled duoneb; albuterol nebs PRN  -- Thoracentesis performed on 1/24. ~ 400 ml removed from right.   -- Daily PSTs, 2 hours twice a day    GI/Nutrition  1. Malnutrition, nia/pro deficits  2. diarrhea; presumed colitis. Resolved   Plan:  --GI consulted. Flex sig performed 1/15; diverticulum look fine, mucosa inflamed/congested; favoring colitis. No ischemia noted.  Biopsy showing-- Nonneoplastic colonic mucosa with microscopic lymphoid aggregate, no evidence of microscopic colitis, inflammatory bowel disease or malignancy.  GI recommends Budesenide, 9mg daily. Unable to give via feeding tube. Completed  5 day course oral prednisone    --  TF are at goal      Renal  1. IMAN; abdominal aortic dissection involving right renal artery.  Atrophic right kidney with decreased flow. No prior hx.  Baseline creatinine appears to be 0.8;   --Lasix drip stopped this 1/14; patient requiring vasopressors due to hypotension following >24 hours of diuretic drip; worsening renal function; metabolic alkalosis. Was placed on scheduled diuretics thereafter   --Renogram 1/7.  Renal US 1/6 with flow present in both R/L renal arteries. Right kidney atrophic.    Plan:  --Nephrology following, off diuretics     ID  1. Presumptive UTI  --Brittany-op Cefepime and Vancomycin; completed    --Cefepime completed 6 days  --Vanco and millie 1/12--1/17    -- On ceftriaxone (started 1/25) and clinda (periop for trach and PEG)    Plan:  -- Thoracentesis cultures sent- NGTD   -- Blood cultures x2- NGTD   -- Sputum/BAL- NGTD  -- UA - grossly positively. Started on IV Rocephin. Follow cultures until speciation. Barrientos replaced 1/24    Endocrine  1. Stress  Hyperglycemia  Plan:  --BG check Q 4 hours. Medium resistance sliding scale insulin as needed      Heme:  1. Acute blood loss anemia  2. Thrombocytopenia; resolved   3. Superficial thrombophlebitis in the right basilic vein   4. Right internal jugular thrombus   Plan:  --Monitor hemoglobin. Transfuse to keep > 7.0. Will give 1 PRBC now for Hgb 6.6  --Warfarin for A Fib and right internal jugular thrombus;  Reversed  for thora and now for trach and PEG. Placed on heparin gtt and coumadin  --US lower extremities to assess for DVT; no evidence of DVT      MSK/Skin  1. Sternotomy   Plan:  -- Sternal precautions  -- Wound care per protocol      General cares:  DVT Prophylaxis: continue PCD's; heparin gtt.   GI Prophylaxis: PPI  Restraints: Restraints for medical healing needed: NO  Family update by me today: Yes  Current lines are required for patient management  Access: JEANNE Loco MD     Time Spent on this Encounter   Billing:  I spent 45 minutes bedside and on the inpatient unit today managing the critical care of Priya Funez in relation to the issues listed in this note.    Physical Exam   Temp: 99.7  F (37.6  C) Temp src: Bladder Temp  Min: 99.5  F (37.5  C)  Max: 100.6  F (38.1  C) BP: 95/59 Pulse: 69 Heart Rate: 70 Resp: 16 SpO2: 97 % O2 Device: Mechanical Ventilator    Vitals:    19 0500 19 0500 19 0600   Weight: 98.4 kg (216 lb 14.9 oz) 99.2 kg (218 lb 11.1 oz) 100.1 kg (220 lb 10.9 oz)     GEN: NAD. Following commands   EYES: PERRL,  Anicteric sclera.   HEENT:  Normocephalic, atraumatic, trachea midline  CV: RRR,   PULM/CHEST:  Course anterior breath sounds bilaterally.   GI: soft + bs   : coello catheter in place, urine yellow and clear  EXTREMITIES: + peripheral edema moving all extremities, peripheral pulses intact  NEURO: carroll follows commands   SKIN: warm and dry  Imaging personally reviewed:  CXR   EC% V-paced     Data   Recent Labs   Lab 19  0278  01/27/19  0005 01/26/19  0405 01/25/19  0306 01/24/19  1730  01/23/19  0415  01/21/19  0430   WBC 6.6  --  8.9 12.9*  --    < > 11.9*   < > 14.8*   HGB 7.4*  --  7.6* 6.6*  --    < > 7.6*   < > 8.0*   MCV 90  --  89 90  --    < > 91   < > 89    299 322 425  --    < > 336   < > 346   INR 1.68*  --  1.71* 1.72*  --    < > 3.37*   < > 2.16*     --  140 137 141   < > 139   < > 138   POTASSIUM 4.0  --  4.8 4.6 4.9   < > 3.3*   < > 3.4   CHLORIDE 106  --  105 101 104   < > 102   < > 101   CO2 28  --  26 27 27   < > 29   < > 30   BUN 91*  --  91* 90* 83*   < > 74*   < > 72*   CR 1.73*  --  1.85* 1.90* 1.75*   < > 1.30*   < > 1.27*   ANIONGAP 8  --  9 9 10   < > 8   < > 7   BESS 8.2*  --  8.2* 7.8* 8.0*   < > 8.8   < > 8.3*   *  --  102* 112* 148*   < > 140*   < > 112*   ALBUMIN  --   --   --   --  3.1*  --   --   --  3.6   PROTTOTAL  --   --   --   --   --   --  6.2*  --  5.8*   BILITOTAL  --   --   --   --   --   --   --   --  1.0   ALKPHOS  --   --   --   --   --   --   --   --  73   ALT  --   --   --   --   --   --   --   --  22   AST  --   --   --   --   --   --   --   --  15    < > = values in this interval not displayed.

## 2019-01-27 NOTE — PROGRESS NOTES
[facility]  Cardiovascular and Thoracic Surgery Daily Note          Assessment and Plan:   POD#23 s/p Left groin cutdown with exposure of the femoral vessels with arterial cannulation for cardiopulmonary bypass. Right groin cutdown with exposure of the femoral vessels with the right femoral artery ultimately used for diagnostic angiography. 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 Visceral angiography. Repair of the bilateral femoral vessels by Dr. Jose Winslow.     CVS:   Blood pressure WNL today, HR 80/ 100% VVI paced.  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 On oral amiodarone. Heparin drip for AFib. Cont coumadin today.  Of note has superficial right arm thrombophlebits and right internal jugular thrombus 2/2 previous central line.  Pulm:  Extubated per protocol and reintubated on POD #8 for resp distress. Extubated POD #14 and reintubated POD #20. Trach POD#21.  CT chest on 1/14 demonstrates RLL compressive atelectasis 2/2 elevated hemidiaphragm (present preoperatively). CXR/CT chest with fluid in fissures.   POD #7 left US guided thoracentesis with removal of 150 mL fluid.   POD #20 right US guided thoracentesis with removal of 400 mL of fluid.  CXR today demonstrates mild bilateral infiltrates - unchanged.  Neuro:  Grossly intact, follows commands. Intermittently confused and anxious. Mixed delirium. On seroquel.  Pain and sedation well managed with current regimen precedex and fentanyl drips  Deconditioned - working with PT/OT/speech  Renal:   IMAN -BL Creat ~0.8, 1.7 today, stable  Renal US on 1/6 with patent flow in bilateral renal arteries. Right kidney atrophic.  Renal on board - appreciate. Appreciate.   Fluid overload with significant edema.  BMP in AM  GI:  Malnourished. Receiving tube feedings via NJ feeding tube. Will  start feeds via PEG today.  Five day course of oral prednisone completed for suspected microscopic colitis, flex sig biopsy negative. Loose stools improving.  CT abdomen on 1/14 did not demonstrate acute pathology.  Dysphagia 2/2 deconditioning.  1/12 and 1/14 negative for c. Diff  On PPI  :  Barrientos discontinued on 1/21, replaced on 1/24 when reintubated  Endo:   Preop A1C 5.3; managed on sliding scale insulin. Goal BG <180  FEN:   Replace lytes as needed; Continue tube feedings  ID:   + UA on 1/24 - Ceftriaxone started  Sputum + candida,on nystatin; BC neg.  Heme:   Acute blood loss anemia and thrombocytopenia  Tubes/Drains:  Left internal jugular central line  Feeding tube  Barrientos  Proph:   BB when able, statin, ASA, coumadin (held for now), PCD, PPI  Dispo:   Continue in ICU with close monitoring  Appreciate consulting services          Interval History:   No major issues overnight. Patient participating in pressure support trials. Resting comfortably on vent this AM.          Medications:       aspirin  81 mg Per Feeding Tube Daily     cefTRIAXone  1 g Intravenous Q24H     clindamycin  900 mg Intravenous Pre-Op/Pre-procedure x 1 dose     clindamycin  900 mg Intravenous See Admin Instructions     influenza Vac Split High-Dose  0.5 mL Intramuscular Prior to discharge     insulin aspart  1-6 Units Subcutaneous Q4H     ipratropium - albuterol 0.5 mg/2.5 mg/3 mL  3 mL Nebulization 4x daily     lidocaine (PF)  10 mL Subcutaneous Once     melatonin  5 mg Sublingual At Bedtime     metoprolol tartrate  25 mg Per Feeding Tube BID     miconazole   Topical BID     nystatin  500,000 Units Oral 4x Daily     pantoprazole  40 mg Oral QAM AC     potassium chloride  20 mEq Oral Daily     QUEtiapine  25 mg Oral At Bedtime     sodium chloride (PF)  10 mL Intracatheter Q8H     acetaminophen, albuterol, IV fluid REPLACEMENT ONLY, glucose **OR** dextrose **OR** glucagon, heparin lock flush, hydrALAZINE, hydrogen peroxide,  "HYDROmorphone, lidocaine 4%, lidocaine (buffered or not buffered), lidocaine (buffered or not buffered), magnesium sulfate, magnesium sulfate, - MEDICATION INSTRUCTIONS -, - MEDICATION INSTRUCTIONS -, methocarbamol, naloxone, ondansetron **OR** ondansetron, oxyCODONE IR, potassium chloride, potassium chloride with lidocaine, potassium chloride, potassium chloride, potassium chloride, QUEtiapine, sodium chloride (PF), sodium chloride (PF), sodium chloride (PF), [COMPLETED] sodium phosphate **FOLLOWED BY** sodium phosphate, sodium phosphate, sodium phosphate, sodium phosphate, sodium phosphate, Warfarin Therapy Reminder          Physical Exam:   Vitals were reviewed  Blood pressure 95/59, pulse 69, temperature 99.7  F (37.6  C), resp. rate 16, height 1.626 m (5' 4\"), weight 100.1 kg (220 lb 10.9 oz), SpO2 97 %.  Rhythm: VVI paced at 80 on bedside monitor     Lungs: Wheezy throughout - on ventilator     Cardiovascular: Distant, S1, S2, RRR, no m/r/g.      Abdomen: Distended/NT. + BS. NJ in place     Extremeties: 2-3+ edema with 1+ palp pedal pulses     Incision(s): Sternal, left chest, and right femoral incisions CDI        Weight:   Vitals:    01/23/19 0400 01/24/19 0534 01/25/19 0500 01/26/19 0500   Weight: 96.6 kg (213 lb) 96.6 kg (212 lb 15.4 oz) 98.4 kg (216 lb 14.9 oz) 99.2 kg (218 lb 11.1 oz)    01/27/19 0600   Weight: 100.1 kg (220 lb 10.9 oz)            Data:   Labs:   Lab Results   Component Value Date    WBC 6.6 01/27/2019     Lab Results   Component Value Date    RBC 2.54 01/27/2019     Lab Results   Component Value Date    HGB 7.4 01/27/2019     Lab Results   Component Value Date    HCT 22.8 01/27/2019     No components found for: MCT  Lab Results   Component Value Date    MCV 90 01/27/2019     Lab Results   Component Value Date    MCH 29.1 01/27/2019     Lab Results   Component Value Date    MCHC 32.5 01/27/2019     Lab Results   Component Value Date    RDW 17.4 01/27/2019     Lab Results   Component " Value Date     01/27/2019       Last Basic Metabolic Panel:  Lab Results   Component Value Date     01/27/2019      Lab Results   Component Value Date    POTASSIUM 4.0 01/27/2019     Lab Results   Component Value Date    CHLORIDE 106 01/27/2019     Lab Results   Component Value Date    BESS 8.2 01/27/2019     Lab Results   Component Value Date    CO2 28 01/27/2019     Lab Results   Component Value Date    BUN 91 01/27/2019     Lab Results   Component Value Date    CR 1.73 01/27/2019     Lab Results   Component Value Date     01/27/2019       CXR: IMPRESSION: The previously seen endotracheal tube has been replaced  with a tracheostomy. Previously seen feeding tube has been removed.  There has been no definite change in other support devices. There  continues to be mild cardiomegaly. Mild bilateral pulmonary  infiltrates are likely unchanged. No other change or abnormality is  noted.     Bobby Wharton MD  Fellow, CT Surgery

## 2019-01-27 NOTE — PROGRESS NOTES
Renal Medicine Progress Note                                Priya Funez MRN# [id number]   Age: 78 year old YOB: 1940   Date of Admission: 1/4/2019 Hospital LOS: 23                  Assessment/Plan:     Following for ARF presumed ATN post emergent type A dissection repair    1.  Baseline creatinine < 1.0 based on presenting creatinine  2.  ARF   -ATN   -non oliguric   -FENA 0.3 % 01/24/19  3.  Increased extravascular volume      Labs stable  Continue to follow off diuretic  Limit ins as able      Interval History:     Follows.  Trached.  IO and UO reviewed.  Family at bedside.  Creatinine stable to improved  Respiratory status stable      ROS:     Intubated: able to follow    Medications and Allergies:     Reviewed    Physical Exam:     Vitals were reviewed  Patient Vitals for the past 12 hrs:   BP Temp Temp src Pulse Heart Rate SpO2 Weight   01/27/19 0630 -- 99.7  F (37.6  C) -- -- 70 97 % --   01/27/19 0615 -- 99.7  F (37.6  C) -- -- 69 96 % --   01/27/19 0600 95/59 99.7  F (37.6  C) -- 69 69 97 % 100.1 kg (220 lb 10.9 oz)   01/27/19 0545 -- 99.7  F (37.6  C) -- -- 69 95 % --   01/27/19 0530 -- 99.7  F (37.6  C) -- -- 69 97 % --   01/27/19 0515 -- 99.7  F (37.6  C) -- -- 70 98 % --   01/27/19 0500 115/59 99.5  F (37.5  C) -- 69 69 95 % --   01/27/19 0445 -- 99.5  F (37.5  C) -- -- 69 97 % --   01/27/19 0430 -- 99.5  F (37.5  C) -- -- 71 93 % --   01/27/19 0415 -- 99.5  F (37.5  C) -- -- 69 98 % --   01/27/19 0400 99/66 99.5  F (37.5  C) Bladder 69 69 -- --   01/27/19 0345 -- 99.5  F (37.5  C) -- -- 69 95 % --   01/27/19 0330 -- 99.5  F (37.5  C) -- -- 69 97 % --   01/27/19 0315 -- 99.5  F (37.5  C) -- -- 69 94 % --   01/27/19 0300 (!) 87/56 99.5  F (37.5  C) -- 69 69 96 % --   01/27/19 0245 -- 99.7  F (37.6  C) -- -- 69 97 % --   01/27/19 0230 -- -- -- -- 69 96 % --   01/27/19 0226 97/64 99.7  F (37.6  C) -- -- 69 90 % --   01/27/19 0215 -- 99.9  F (37.7  C) -- -- 69 96 % --   01/27/19 0200 (!)  81/48 99.9  F (37.7  C) -- -- 69 96 % --   01/27/19 0145 -- 100  F (37.8  C) -- -- 69 97 % --   01/27/19 0130 -- 100  F (37.8  C) -- -- 70 97 % --   01/27/19 0115 -- 100  F (37.8  C) -- -- 69 99 % --   01/27/19 0100 (!) 88/53 -- -- -- 70 98 % --   01/27/19 0045 -- 100  F (37.8  C) -- -- 69 98 % --   01/27/19 0030 -- 100  F (37.8  C) -- -- 69 96 % --   01/27/19 0015 -- 100  F (37.8  C) -- -- 69 96 % --   01/27/19 0000 97/60 99.9  F (37.7  C) Bladder -- 69 97 % --   01/26/19 2345 -- 99.9  F (37.7  C) -- -- 70 95 % --   01/26/19 2330 -- 99.9  F (37.7  C) -- -- 69 97 % --   01/26/19 2315 -- 100  F (37.8  C) -- -- 69 98 % --   01/26/19 2300 (!) 80/45 100  F (37.8  C) -- -- 69 97 % --   01/26/19 2245 -- 100.2  F (37.9  C) -- -- 70 97 % --   01/26/19 2230 -- -- -- -- 70 97 % --   01/26/19 2215 -- 100.2  F (37.9  C) -- -- 69 97 % --   01/26/19 2200 103/74 100.2  F (37.9  C) -- -- 69 97 % --   01/26/19 2145 -- 100.2  F (37.9  C) -- -- 69 97 % --   01/26/19 2130 -- -- -- -- 69 98 % --     I/O last 3 completed shifts:  In: 1196.24 [I.V.:906.24; NG/GT:60]  Out: 880 [Urine:880]    Vitals:    01/23/19 0400 01/24/19 0534 01/25/19 0500 01/26/19 0500   Weight: 96.6 kg (213 lb) 96.6 kg (212 lb 15.4 oz) 98.4 kg (216 lb 14.9 oz) 99.2 kg (218 lb 11.1 oz)    01/27/19 0600   Weight: 100.1 kg (220 lb 10.9 oz)       GENERAL:  Intubated; follows  HEENT: ETT  RESP:  clear anteriorly  CV: RRR, normal S1 S2  ABDOMEN: soft, nontender, no HSM or masses and bowel sounds normal  :  coello catheter  SKIN: clear without significant rashes or lesions  EXT: 2 plus edema    Data:     Recent Labs   Lab 01/27/19  0430 01/26/19  0405 01/25/19  0306 01/24/19  1730    140 137 141   POTASSIUM 4.0 4.8 4.6 4.9   CHLORIDE 106 105 101 104   CO2 28 26 27 27   ANIONGAP 8 9 9 10   * 102* 112* 148*   BUN 91* 91* 90* 83*   CR 1.73* 1.85* 1.90* 1.75*   GFRESTIMATED 28* 26* 25* 28*   GFRESTBLACK 33* 30* 29* 32*   BESS 8.2* 8.2* 7.8* 8.0*         Recent Labs    Lab 01/27/19  0430 01/26/19  0405 01/25/19  0306 01/24/19  1730 01/24/19  0455 01/23/19  0415 01/22/19  0435 01/21/19  0430   CR 1.73* 1.85* 1.90* 1.75* 1.47* 1.30* 1.28* 1.27*     Recent Labs   Lab 01/24/19  1730 01/21/19  0430   ALBUMIN 3.1* 3.6     Recent Labs   Lab 01/27/19  0430 01/26/19  0405 01/25/19  0306 01/24/19  1730 01/24/19  1403 01/24/19  0455 01/23/19 2059 01/21/19 0430   PHOS  --   --   --  4.0  --  4.0 2.7  --  3.5   HGB 7.4* 7.6* 6.6*  --  7.3* 7.5*  --    < > 8.0*    < > = values in this interval not displayed.     Recent Labs   Lab 01/24/19  1825   FENAPR 0.3       G Narinder Brown    [facility] - Nephrology  [phone]

## 2019-01-27 NOTE — PLAN OF CARE
OT: Eval complete and Tx initiated. Pt admitted for jaw pain on 1/4 and evaluated POD #23 of aortic dissection repair. Pt now trached at time eval due to several attempts to extubated with re-intubation. Prior to admit, pt lives in house with  and reports I with ADL/IADLs, including driving and household mgmt.    Discharge Planner OT   Patient plan for discharge: Rehab before returning home    Current status: With use of sling, pt sat at EOB with mod-max A x1-2. Pt required max A for seated ADLs.     Barriers to return to prior living situation: Currently level of A required; O2 needs; Fall risk; Stairs to enter and within home    Recommendations for discharge: ARU    Rationale for recommendations: Pt limited by B UE weakness, impaired safety, pain, and decreased balance; however, pt remains very motivated with excellent family support. OT will proceed with intervention during hospitalization to ensure safe and full return to PLOF with ADL/IADLs.        Entered by: Siria Allred 01/27/2019 2:32 PM

## 2019-01-27 NOTE — PLAN OF CARE
[facility]   Intensive Care Unit   Nursing Note    Vital signs stable during the day.  PERRL.  Moves all extremities.  Follows commands.  Tolerating ventilator on precedex and fentanyl. Pt was up in chair for 2.5 hours today. Pt declined the chair in the afternoon.  and Family updated at the bedside today.  Labs noted.  Continue to monitor closely.      ROUTINE IP LABS (Last four results)  BMP  Recent Labs   Lab 01/27/19  0430 01/26/19  0405 01/25/19  0306 01/24/19  1730    140 137 141   POTASSIUM 4.0 4.8 4.6 4.9   CHLORIDE 106 105 101 104   BESS 8.2* 8.2* 7.8* 8.0*   CO2 28 26 27 27   BUN 91* 91* 90* 83*   CR 1.73* 1.85* 1.90* 1.75*   * 102* 112* 148*     CBC  Recent Labs   Lab 01/27/19  0430 01/27/19  0005 01/26/19  0405 01/25/19  0306 01/24/19  1403   WBC 6.6  --  8.9 12.9* 13.8*   RBC 2.54*  --  2.62* 2.27* 2.49*   HGB 7.4*  --  7.6* 6.6* 7.3*   HCT 22.8*  --  23.4* 20.4* 22.5*   MCV 90  --  89 90 90   MCH 29.1  --  29.0 29.1 29.3   MCHC 32.5  --  32.5 32.4 32.4   RDW 17.4*  --  17.5* 18.0* 18.2*    299 322 425 407     INR  Recent Labs   Lab 01/27/19  0430 01/26/19  0405 01/25/19  0306 01/24/19  0455   INR 1.68* 1.71* 1.72* 1.57*       Kemal Carey RN

## 2019-01-27 NOTE — PROGRESS NOTES
01/27/19 1419   Quick Adds   Type of Visit Initial Occupational Therapy Evaluation   Living Environment   Lives With spouse   Living Arrangements house   Home Accessibility stairs to enter home;stairs within home   Living Environment Comment 2 steps to enter home; Flight of stairs to basement for laundry room   Self-Care   Usual Activity Tolerance excellent   Current Activity Tolerance fair   Equipment Currently Used at Home none   Functional Level   Ambulation 0-->independent   Transferring 0-->independent   Toileting 0-->independent   Bathing 0-->independent   Dressing 0-->independent   Eating 0-->independent   Communication 0-->understands/communicates without difficulty   Swallowing 0-->swallows foods/liquids without difficulty   Cognition 0 - no cognition issues reported   Fall history within last six months no   General Information   Onset of Illness/Injury or Date of Surgery - Date 01/04/19   Referring Physician Dr Loco   Patient/Family Goals Statement Rehab before returning home   Additional Occupational Profile Info/Pertinent History of Current Problem Admitted for jaw pain. Seen POD #23 of aortic dissection repair. Extubated on 1/5 and pacemaker placement on 1/11. Re-intubated on 1/12. On 1/24, thoracentesis completed with re-intubation. Trach and PEG placed on 1/25.    Precautions/Limitations fall precautions;oxygen therapy device and L/min  (trached at eval)   General Observations Son and  present for OT eval   Cognitive Status Examination   Orientation orientation to person, place and time   Level of Consciousness alert   Follows Commands (Cognition) WNL   Memory intact   Pain Assessment   Patient Currently in Pain Yes, see Vital Sign flowsheet   Range of Motion (ROM)   ROM Quick Adds No deficits were identified   ROM Comment B UE WNL   Strength   Manual Muscle Testing Quick Adds Other   Strength Comments B UE 4/5 on MMT   Hand Strength   Hand Strength Comments B grasp weak   Mobility   Bed  Mobility Bed mobility skill: Rolling/Turning;Bed mobility skill: Sit to supine;Bed mobility skill: Scooting/Bridging;Bed mobility skill: Supine to sit;Bed mobility analysis   Bed Mobility Skill: Rolling/Turning   Level of [place] - Bed Mobility Skill Rolling Turning maximum assist (25% patients effort)   Physical Assist/Nonphysical Assist 2 persons   Bed Mobility Skill: Scooting/Bridging   Level of [place]: Scooting/Bridging maximum assist (25% patients effort)   Physical Assist/Nonphysical Assist: Scooting/Bridging 2 persons   Bed Mobility Skill: Sit to Supine   Level of [place]: Sit/Supine unable to perform   Bed Mobility Skill: Supine to Sit   Level of [place]: Supine/Sit dependent (less than 25% patients effort)   Bed Mobility Analysis   Impairments Contributing to Impaired Bed Mobility impaired balance;pain;decreased strength   Transfer Skills   Transfer Transfer Skill: Stand to Sit;Transfer Safety Analysis Bed/Chair;Transfer Safety Analysis Sit/Stand   Transfer Skill: Bed to Chair/Chair to Bed   Level of [place]: Bed to Chair dependent (less than 25% patients effort)   Transfer Skill: Sit to Stand   Level of [place]: Sit/Stand unable to perform   Transfer Skill: Toilet Transfer   Level of [place]: Toilet unable to perform   Upper Body Dressing   Level of [place]: Dress Upper Body moderate assist (50% patients effort)   Lower Body Dressing   Level of [place]: Dress Lower Body maximum assist (25% patients effort)   Toileting   Level of [place]: Toilet maximum assist (25% patients effort)   Grooming   Level of [place]: Grooming moderate assist (50% patients effort)   Instrumental Activities of Daily Living (IADL)   Previous Responsibilities meal prep;housekeeping;laundry;yardwork;medication management;driving   Activities of Daily Living Analysis   Impairments Contributing to Impaired Activities of Daily Living balance impaired;fear and anxiety;pain;strength  "decreased   General Therapy Interventions   Planned Therapy Interventions ADL retraining;strengthening;transfer training   Clinical Impression   Criteria for Skilled Therapeutic Interventions Met yes, treatment indicated   OT Diagnosis Decreased I and safety with ADLs   Influenced by the following impairments Pain; Impaired safety; Decreased balance and activity tolerance; B UE weakness   Assessment of Occupational Performance 5 or more Performance Deficits   Identified Performance Deficits Dressing; Toileting; Bathing; Driving; Household mgmt   Clinical Decision Making (Complexity) High complexity   Therapy Frequency 5 times/wk   Predicted Duration of Therapy Intervention (days/wks) 7 days   Anticipated Discharge Disposition Acute Rehabilitation Facility   Risks and Benefits of Treatment have been explained. Yes   Patient, Family & other staff in agreement with plan of care Yes   [facility] TM \"6 Clicks\"   2016, Trustees of [facility], under license to [employer].  All rights reserved.   6 Clicks Short Forms Daily Activity Inpatient Short Form   [facility]  \"6 Clicks\" Daily Activity Inpatient Short Form   1. Putting on and taking off regular lower body clothing? 1 - Total   2. Bathing (including washing, rinsing, drying)? 1 - Total   3. Toileting, which includes using toilet, bedpan or urinal? 1 - Total   4. Putting on and taking off regular upper body clothing? 2 - A Lot   5. Taking care of personal grooming such as brushing teeth? 2 - A Lot   6. Eating meals? 2 - A Lot   Daily Activity Raw Score (Score out of 24.Lower scores equate to lower levels of function) 9   Total Evaluation Time   Total Evaluation Time (Minutes) 10     "

## 2019-01-28 ENCOUNTER — APPOINTMENT (OUTPATIENT)
Dept: OCCUPATIONAL THERAPY | Facility: CLINIC | Age: 79
DRG: 003 | End: 2019-01-28
Payer: COMMERCIAL

## 2019-01-28 ENCOUNTER — APPOINTMENT (OUTPATIENT)
Dept: PHYSICAL THERAPY | Facility: CLINIC | Age: 79
DRG: 003 | End: 2019-01-28
Payer: COMMERCIAL

## 2019-01-28 LAB
ALBUMIN SERPL-MCNC: 2.7 G/DL (ref 3.4–5)
ALP SERPL-CCNC: 98 U/L (ref 40–150)
ALT SERPL W P-5'-P-CCNC: 65 U/L (ref 0–50)
ANION GAP SERPL CALCULATED.3IONS-SCNC: 8 MMOL/L (ref 3–14)
AST SERPL W P-5'-P-CCNC: 34 U/L (ref 0–45)
BASOPHILS # BLD AUTO: 0 10E9/L (ref 0–0.2)
BASOPHILS NFR BLD AUTO: 0.3 %
BILIRUB SERPL-MCNC: 0.8 MG/DL (ref 0.2–1.3)
BUN SERPL-MCNC: 83 MG/DL (ref 7–30)
CALCIUM SERPL-MCNC: 7.9 MG/DL (ref 8.5–10.1)
CHLORIDE SERPL-SCNC: 110 MMOL/L (ref 94–109)
CO2 SERPL-SCNC: 27 MMOL/L (ref 20–32)
CREAT SERPL-MCNC: 1.5 MG/DL (ref 0.52–1.04)
DIFFERENTIAL METHOD BLD: ABNORMAL
EOSINOPHIL # BLD AUTO: 0.3 10E9/L (ref 0–0.7)
EOSINOPHIL NFR BLD AUTO: 4.6 %
ERYTHROCYTE [DISTWIDTH] IN BLOOD BY AUTOMATED COUNT: 18 % (ref 10–15)
GFR SERPL CREATININE-BSD FRML MDRD: 33 ML/MIN/{1.73_M2}
GLUCOSE BLDC GLUCOMTR-MCNC: 129 MG/DL (ref 70–99)
GLUCOSE BLDC GLUCOMTR-MCNC: 137 MG/DL (ref 70–99)
GLUCOSE BLDC GLUCOMTR-MCNC: 138 MG/DL (ref 70–99)
GLUCOSE BLDC GLUCOMTR-MCNC: 146 MG/DL (ref 70–99)
GLUCOSE BLDC GLUCOMTR-MCNC: 155 MG/DL (ref 70–99)
GLUCOSE SERPL-MCNC: 148 MG/DL (ref 70–99)
HCT VFR BLD AUTO: 23.3 % (ref 35–47)
HGB BLD-MCNC: 7.4 G/DL (ref 11.7–15.7)
IMM GRANULOCYTES # BLD: 0 10E9/L (ref 0–0.4)
IMM GRANULOCYTES NFR BLD: 0.5 %
INR PPP: 1.94 (ref 0.86–1.14)
LMWH PPP CHRO-ACNC: 0.16 IU/ML
LYMPHOCYTES # BLD AUTO: 0.5 10E9/L (ref 0.8–5.3)
LYMPHOCYTES NFR BLD AUTO: 8.8 %
MAGNESIUM SERPL-MCNC: 2.4 MG/DL (ref 1.6–2.3)
MCH RBC QN AUTO: 29.2 PG (ref 26.5–33)
MCHC RBC AUTO-ENTMCNC: 31.8 G/DL (ref 31.5–36.5)
MCV RBC AUTO: 92 FL (ref 78–100)
MONOCYTES # BLD AUTO: 0.3 10E9/L (ref 0–1.3)
MONOCYTES NFR BLD AUTO: 5.2 %
NEUTROPHILS # BLD AUTO: 4.8 10E9/L (ref 1.6–8.3)
NEUTROPHILS NFR BLD AUTO: 80.6 %
NRBC # BLD AUTO: 0 10*3/UL
NRBC BLD AUTO-RTO: 1 /100
PHOSPHATE SERPL-MCNC: 4.1 MG/DL (ref 2.5–4.5)
PLATELET # BLD AUTO: 279 10E9/L (ref 150–450)
POTASSIUM SERPL-SCNC: 3.7 MMOL/L (ref 3.4–5.3)
PROT SERPL-MCNC: 5.6 G/DL (ref 6.8–8.8)
RBC # BLD AUTO: 2.53 10E12/L (ref 3.8–5.2)
SODIUM SERPL-SCNC: 145 MMOL/L (ref 133–144)
TRIGL SERPL-MCNC: 96 MG/DL
WBC # BLD AUTO: 5.9 10E9/L (ref 4–11)

## 2019-01-28 PROCEDURE — 99291 CRITICAL CARE FIRST HOUR: CPT | Performed by: NURSE PRACTITIONER

## 2019-01-28 PROCEDURE — 25000128 H RX IP 250 OP 636: Performed by: SURGERY

## 2019-01-28 PROCEDURE — 25000128 H RX IP 250 OP 636: Performed by: STUDENT IN AN ORGANIZED HEALTH CARE EDUCATION/TRAINING PROGRAM

## 2019-01-28 PROCEDURE — 25000132 ZZH RX MED GY IP 250 OP 250 PS 637: Performed by: NURSE PRACTITIONER

## 2019-01-28 PROCEDURE — 25000132 ZZH RX MED GY IP 250 OP 250 PS 637: Performed by: INTERNAL MEDICINE

## 2019-01-28 PROCEDURE — 85610 PROTHROMBIN TIME: CPT | Performed by: INTERNAL MEDICINE

## 2019-01-28 PROCEDURE — 84478 ASSAY OF TRIGLYCERIDES: CPT | Performed by: INTERNAL MEDICINE

## 2019-01-28 PROCEDURE — 97530 THERAPEUTIC ACTIVITIES: CPT | Mod: GP | Performed by: PHYSICAL THERAPIST

## 2019-01-28 PROCEDURE — 40000193 ZZH STATISTIC PT WARD VISIT: Performed by: PHYSICAL THERAPIST

## 2019-01-28 PROCEDURE — 25000128 H RX IP 250 OP 636: Performed by: THORACIC SURGERY (CARDIOTHORACIC VASCULAR SURGERY)

## 2019-01-28 PROCEDURE — 94640 AIRWAY INHALATION TREATMENT: CPT | Mod: 76

## 2019-01-28 PROCEDURE — 25000125 ZZHC RX 250: Performed by: NURSE PRACTITIONER

## 2019-01-28 PROCEDURE — 80053 COMPREHEN METABOLIC PANEL: CPT | Performed by: INTERNAL MEDICINE

## 2019-01-28 PROCEDURE — 25000132 ZZH RX MED GY IP 250 OP 250 PS 637: Performed by: THORACIC SURGERY (CARDIOTHORACIC VASCULAR SURGERY)

## 2019-01-28 PROCEDURE — 85025 COMPLETE CBC W/AUTO DIFF WBC: CPT | Performed by: INTERNAL MEDICINE

## 2019-01-28 PROCEDURE — 97110 THERAPEUTIC EXERCISES: CPT | Mod: GO

## 2019-01-28 PROCEDURE — 40000008 ZZH STATISTIC AIRWAY CARE

## 2019-01-28 PROCEDURE — 40000133 ZZH STATISTIC OT WARD VISIT

## 2019-01-28 PROCEDURE — 94003 VENT MGMT INPAT SUBQ DAY: CPT

## 2019-01-28 PROCEDURE — 20000003 ZZH R&B ICU

## 2019-01-28 PROCEDURE — 00000146 ZZHCL STATISTIC GLUCOSE BY METER IP

## 2019-01-28 PROCEDURE — P9041 ALBUMIN (HUMAN),5%, 50ML: HCPCS | Performed by: INTERNAL MEDICINE

## 2019-01-28 PROCEDURE — 84100 ASSAY OF PHOSPHORUS: CPT | Performed by: INTERNAL MEDICINE

## 2019-01-28 PROCEDURE — 40000275 ZZH STATISTIC RCP TIME EA 10 MIN

## 2019-01-28 PROCEDURE — 97110 THERAPEUTIC EXERCISES: CPT | Mod: GP | Performed by: PHYSICAL THERAPIST

## 2019-01-28 PROCEDURE — 25000128 H RX IP 250 OP 636: Performed by: INTERNAL MEDICINE

## 2019-01-28 PROCEDURE — 25000128 H RX IP 250 OP 636: Performed by: NURSE PRACTITIONER

## 2019-01-28 PROCEDURE — 94640 AIRWAY INHALATION TREATMENT: CPT

## 2019-01-28 PROCEDURE — 27210437 ZZH NUTRITION PRODUCT SEMIELEM INTERMED LITER

## 2019-01-28 PROCEDURE — 83735 ASSAY OF MAGNESIUM: CPT | Performed by: INTERNAL MEDICINE

## 2019-01-28 PROCEDURE — 25000132 ZZH RX MED GY IP 250 OP 250 PS 637: Performed by: STUDENT IN AN ORGANIZED HEALTH CARE EDUCATION/TRAINING PROGRAM

## 2019-01-28 PROCEDURE — 85520 HEPARIN ASSAY: CPT | Performed by: INTERNAL MEDICINE

## 2019-01-28 RX ORDER — CALCIUM CARBONATE 500 MG/1
1000 TABLET, CHEWABLE ORAL
Status: DISCONTINUED | OUTPATIENT
Start: 2019-01-28 | End: 2019-01-29 | Stop reason: HOSPADM

## 2019-01-28 RX ORDER — ALBUMIN, HUMAN INJ 5% 5 %
12.5 SOLUTION INTRAVENOUS ONCE
Status: COMPLETED | OUTPATIENT
Start: 2019-01-28 | End: 2019-01-28

## 2019-01-28 RX ORDER — CEFTRIAXONE 1 G/1
1 INJECTION, POWDER, FOR SOLUTION INTRAMUSCULAR; INTRAVENOUS ONCE
Status: DISCONTINUED | OUTPATIENT
Start: 2019-01-28 | End: 2019-01-28

## 2019-01-28 RX ORDER — FUROSEMIDE 10 MG/ML
40 INJECTION INTRAMUSCULAR; INTRAVENOUS ONCE
Status: COMPLETED | OUTPATIENT
Start: 2019-01-28 | End: 2019-01-28

## 2019-01-28 RX ORDER — IPRATROPIUM BROMIDE AND ALBUTEROL SULFATE 2.5; .5 MG/3ML; MG/3ML
3 SOLUTION RESPIRATORY (INHALATION) EVERY 4 HOURS PRN
Status: DISCONTINUED | OUTPATIENT
Start: 2019-01-28 | End: 2019-01-29 | Stop reason: HOSPADM

## 2019-01-28 RX ORDER — WARFARIN SODIUM 3 MG/1
3 TABLET ORAL
Status: COMPLETED | OUTPATIENT
Start: 2019-01-28 | End: 2019-01-28

## 2019-01-28 RX ORDER — QUETIAPINE FUMARATE 25 MG/1
25 TABLET, FILM COATED ORAL 2 TIMES DAILY
Status: DISCONTINUED | OUTPATIENT
Start: 2019-01-28 | End: 2019-01-29 | Stop reason: HOSPADM

## 2019-01-28 RX ADMIN — FUROSEMIDE 40 MG: 10 INJECTION, SOLUTION INTRAVENOUS at 09:53

## 2019-01-28 RX ADMIN — IPRATROPIUM BROMIDE AND ALBUTEROL SULFATE 3 ML: .5; 2.5 SOLUTION RESPIRATORY (INHALATION) at 23:15

## 2019-01-28 RX ADMIN — INSULIN ASPART 1 UNITS: 100 INJECTION, SOLUTION INTRAVENOUS; SUBCUTANEOUS at 04:10

## 2019-01-28 RX ADMIN — CEFTRIAXONE SODIUM 1 G: 1 INJECTION, POWDER, FOR SOLUTION INTRAMUSCULAR; INTRAVENOUS at 03:31

## 2019-01-28 RX ADMIN — Medication 5 MG: at 22:10

## 2019-01-28 RX ADMIN — HYDROMORPHONE HYDROCHLORIDE 0.5 MG: 1 INJECTION, SOLUTION INTRAMUSCULAR; INTRAVENOUS; SUBCUTANEOUS at 20:55

## 2019-01-28 RX ADMIN — METOPROLOL TARTRATE 25 MG: 25 TABLET ORAL at 07:58

## 2019-01-28 RX ADMIN — POTASSIUM CHLORIDE 20 MEQ: 1.5 POWDER, FOR SOLUTION ORAL at 07:58

## 2019-01-28 RX ADMIN — ONDANSETRON 4 MG: 2 INJECTION INTRAMUSCULAR; INTRAVENOUS at 20:25

## 2019-01-28 RX ADMIN — ALBUMIN HUMAN 12.5 G: 0.05 INJECTION, SOLUTION INTRAVENOUS at 09:17

## 2019-01-28 RX ADMIN — ACETAMINOPHEN 650 MG: 325 TABLET, FILM COATED ORAL at 18:51

## 2019-01-28 RX ADMIN — MICONAZOLE NITRATE: 2 POWDER TOPICAL at 14:00

## 2019-01-28 RX ADMIN — ONDANSETRON 4 MG: 2 INJECTION INTRAMUSCULAR; INTRAVENOUS at 11:53

## 2019-01-28 RX ADMIN — ACETAMINOPHEN 650 MG: 325 TABLET, FILM COATED ORAL at 22:10

## 2019-01-28 RX ADMIN — Medication 40 MG: at 07:58

## 2019-01-28 RX ADMIN — WARFARIN SODIUM 3 MG: 3 TABLET ORAL at 18:51

## 2019-01-28 RX ADMIN — HEPARIN SODIUM 1400 UNITS/HR: 10000 INJECTION, SOLUTION INTRAVENOUS at 04:01

## 2019-01-28 RX ADMIN — ACETAMINOPHEN 650 MG: 325 TABLET, FILM COATED ORAL at 07:59

## 2019-01-28 RX ADMIN — METOPROLOL TARTRATE 25 MG: 25 TABLET ORAL at 20:12

## 2019-01-28 RX ADMIN — QUETIAPINE 25 MG: 25 TABLET ORAL at 20:12

## 2019-01-28 RX ADMIN — QUETIAPINE 25 MG: 25 TABLET ORAL at 11:57

## 2019-01-28 RX ADMIN — MICONAZOLE NITRATE: 2 POWDER TOPICAL at 20:12

## 2019-01-28 RX ADMIN — HYDROMORPHONE HYDROCHLORIDE 0.5 MG: 1 INJECTION, SOLUTION INTRAMUSCULAR; INTRAVENOUS; SUBCUTANEOUS at 02:01

## 2019-01-28 RX ADMIN — ASPIRIN 81 MG 81 MG: 81 TABLET ORAL at 07:59

## 2019-01-28 RX ADMIN — NYSTATIN 500000 UNITS: 500000 SUSPENSION ORAL at 11:56

## 2019-01-28 ASSESSMENT — ACTIVITIES OF DAILY LIVING (ADL)
ADLS_ACUITY_SCORE: 17

## 2019-01-28 ASSESSMENT — MIFFLIN-ST. JEOR: SCORE: 1485

## 2019-01-28 NOTE — PLAN OF CARE
Discharge Planner PT   Patient plan for discharge: None stated.  Current status: Rolling Max A x2. Sling placed for up to combilizer chair. Stood @ 45 degrees upright x 8 min, tolerated well. Note sound heard with pt attempt at talking-RN present and notified RT. Up in chair position, RN at bedside upon PT exit.  Barriers to return to prior living situation: Level of assist, fatigue, impaired activity tolerance, weakness, decreased balance, fall risk  Recommendations for discharge: Acute Rehab  Rationale for recommendations: pt will benefit from an intense rehab stay to improve functional mobility and independence, continue to progress exercise tolerance and education for optimal heart health.       Entered by: Na Nassar 01/28/2019 10:13 AM

## 2019-01-28 NOTE — PLAN OF CARE
Patient resting comfortably on current vent settings. Patient 100% paced adequate blood pressures. Patient alert and oriented. Patient tolerating tube feedings well at goal. Adequate urine output via coello catheter. No BM this shift. PRN dilaudid given for pain management. Patient has reddened andriy area, powder applied. Patient updated on POC, will continue to monitor.

## 2019-01-28 NOTE — PROGRESS NOTES
CLINICAL NUTRITION SERVICES - REASSESSMENT NOTE      Malnutrition: (1/12)  % Weight Loss:  None noted  % Intake:  </= 50% for >/= 5 days (severe malnutrition)  Subcutaneous Fat Loss:  None observed  Muscle Loss:  Temporal region mild depletion and Clavicle bone region mild depletion  Fluid Retention:  Mild - Could be partly nutritional due to prolonged poor nutrition     Malnutrition Diagnosis: Non-Severe malnutrition  In Context of:  Acute illness or injury       EVALUATION OF PROGRESS TOWARD GOALS   Diet:  NPO  Nutrition Support:    1/13: TF initiated at trickle feed (intermittent with TPN) - restarted on 1/15  1/22: TF advanced to goal     Nutrition Support Enteral:  Type of Feeding Tube: Nasoduodenal   Enteral Frequency:  Continuous  Enteral Regimen: Peptamen 1.5 at 50 mL/hr   Total Enteral Provisions: 1800 kcal (29 kcal/kg), 82 g protein (1.3 g/kg), 924 mL H2O, 226 g CHO, no fiber   Free Water Flush: 100mL Q4hrs and 60mL and Q 4hrs - per NP order today       Intake:    -Phos and K (NL), Mg 2.4 (H)  -Na 145 (H), BUN 83 (H)- trending down, Cr. 1.50 (H) - trending down  -BMG  (MSSI).  -BM x1 (1/27)  -Wt trending up this admission (Lasix, thoracentesis this admission)        ASSESSED NUTRITION NEEDS:  Dosing Weight:  62 kg (adjusted for overweight)  Estimated Energy Needs:  8841-4537 kcals (25-30 Kcal/Kg)  Justification: overweight  Estimated Protein Needs:  74-87 grams protein (1.2-1.4 g pro/Kg)  Justification: repletion, post-op and atrophic right kidney       NEW FINDINGS:   1/25: 20-Danish PEG placed   1/25: Tracheostomy     Previous Goals:   Peptamen 1.5 at 50 mL/hr will continue to meet % needs   Evaluation: Met    Previous Nutrition Diagnosis:   No nutrition diagnosis identified at this time  Evaluation: No change      CURRENT NUTRITION DIAGNOSIS  No nutrition diagnosis identified at this time       INTERVENTIONS  Recommendations / Nutrition Prescription  Continue Peptamen 1.5 at 50mL/hr    Continue free water flushes per MD      Implementation  Collaboration and Referral of Nutrition care: Discussed pt during interdisciplinary rounds     Goals  Enteral nutrition to meet 90%-110% of assessed needs       MONITORING AND EVALUATION:  Progress towards goals will be monitored and evaluated per protocol and Practice Guidelines      Venita Pettit RD, LD

## 2019-01-28 NOTE — PROGRESS NOTES
[facility] ICU RESPIRATORY NOTE        Date of Admission: 1/4/2019    Date of Intubation (most recent): 1/24/18    Reason for Mechanical Ventilation: Resp failure    Number of Days on Mechanical Ventilation: 3    Met Criteria for Pressure Support Trial: Yes    Length of Pressure Support Trial:     Reason for Stopping Pressure Support Trial: Attempted PS but RR was in the upper 30's within minutes and pt stated she felt SOB    Reason for No Pressure Support Trial:     Significant Events Today:     ABG Results:   Recent Labs   Lab 01/24/19  0837 01/20/19 2000   PH 7.50* 7.49*   PCO2 33* 35   PO2 33* 134*   HCO3 26 27   O2PER  --  40%       Current Vent Settings: Ventilation Mode: CMV/AC  (Continuous Mandatory Ventilation/ Assist Control)  FiO2 (%): 40 %  Rate Set (breaths/minute): 16 breaths/min  Tidal Volume Set (mL): 480 mL  PEEP (cm H2O): 5 cmH2O  Oxygen Concentration (%): 40 %  Resp: 16      Skin Assessment:     Plan: Pt to remain on full vent support overnight    Jose Enrique Wright

## 2019-01-28 NOTE — PROGRESS NOTES
" Renal Medicine Progress Note             Assessment/Plan:         1.  Non-oliguric IMAN/CKD with significant hypervolemia   - ATN ( 2/2 AAA, along with renal vascular injury) . Diuretics held in view of rising S.Cr , and now downtrending. Decent UOp but continues in positive I/O with gradually rising weight.   - Significant total volume up with extravascular seeping of fluid. Massive edema and wt trending up .   - Would favor restarting low dose diuretics and try to get gentle -ve I/O each day ( ~1-2lbs)   - Will give Lasix 40 mg IV with 12.5 mg Albumin today         2.  S/p aortic dissection/repair.    A.  Per CV Surg.    3.  SOB/hypoxia/pneumonia - trach'ed , on vent . Stable    4.  Anemia. Hgb stable  A.  Follow labs.  B.  Transfuse prn.     5.  FEN.   Na slowly trending up . Will increase free water flush          Interval History:     Trach'ed , vent'ed. Breathing stable. Reports she feels \"okay\"  Off diuretics. Clear urine . 1.1 L UOp with+ 430cc.   Sodium trending up .   Edema - increasing.     ROS - Afebrile. No nausea, vomiting. Hgb stable. No melena/ hematochezia. ROS limited d/t pt being on vent            Medications and Allergies:       aspirin  81 mg Per Feeding Tube Daily     influenza Vac Split High-Dose  0.5 mL Intramuscular Prior to discharge     insulin aspart  1-6 Units Subcutaneous Q4H     lidocaine (PF)  10 mL Subcutaneous Once     melatonin  5 mg Sublingual At Bedtime     metoprolol tartrate  25 mg Per Feeding Tube BID     miconazole   Topical BID     nystatin  500,000 Units Oral 4x Daily     pantoprazole  40 mg Oral QAM AC     potassium chloride  20 mEq Oral Daily     QUEtiapine  25 mg Oral BID     sodium chloride (PF)  10 mL Intracatheter Q8H     warfarin  3 mg Oral ONCE at 18:00     Allergies   Allergen Reactions     Amoxicillin Swelling     Ciprofloxacin Swelling          Physical Exam:     Vitals were reviewed  Heart Rate: 69, Blood pressure 128/82, pulse 69, temperature 99.9  F (37.7 " " C), temperature source Bladder, resp. rate 20, height 1.626 m (5' 4\"), weight 101 kg (222 lb 10.6 oz), SpO2 95 %.  Wt Readings from Last 3 Encounters:   01/28/19 101 kg (222 lb 10.6 oz)   09/14/08 80.7 kg (178 lb)       GENERAL APPEARANCE: , awake, alert, interactive, on vent/ trach  HEENT:  Eyes/ears/nose/neck grossly nl , Trach +  RESP: + few wheezes, rhonchi , diminished breath sounds at bases  CV: RRR c 2/6 m, nl S1/S2, + pacer; sternal incision looks fine  ABDOMEN: o/s/nt/nd  EXTREMITIES/SKIN: 3+ edema  NEURO:  eyes open, moves ext, follows all commands properly  LINES:  + coello c yellow urine; + RIJ 3-lumen         Data:     CBC RESULTS:     Recent Labs   Lab 01/28/19  0405 01/27/19  0430 01/27/19  0005 01/26/19  0405 01/25/19  0306 01/24/19  1403 01/24/19  0455   WBC 5.9 6.6  --  8.9 12.9* 13.8* 13.2*   RBC 2.53* 2.54*  --  2.62* 2.27* 2.49* 2.54*   HGB 7.4* 7.4*  --  7.6* 6.6* 7.3* 7.5*   HCT 23.3* 22.8*  --  23.4* 20.4* 22.5* 23.3*    298 299 322 425 407 375     Basic Metabolic Panel:  Recent Labs   Lab 01/28/19  0405 01/27/19  0430 01/26/19  0405 01/25/19  0306 01/24/19  1730 01/24/19  0455   * 142 140 137 141 138   POTASSIUM 3.7 4.0 4.8 4.6 4.9 4.1   CHLORIDE 110* 106 105 101 104 101   CO2 27 28 26 27 27 27   BUN 83* 91* 91* 90* 83* 76*   CR 1.50* 1.73* 1.85* 1.90* 1.75* 1.47*   * 119* 102* 112* 148* 154*   BESS 7.9* 8.2* 8.2* 7.8* 8.0* 8.5     INR  Recent Labs   Lab 01/28/19  0405 01/27/19  0430 01/26/19  0405 01/25/19  0306   INR 1.94* 1.68* 1.71* 1.72*      Attestation:   I have reviewed today's relevant vital signs, notes, medications, labs and imaging.    Vikash Monroe MD  [facility] - Nephrology   [phone]    "

## 2019-01-28 NOTE — PLAN OF CARE
"Discharge Planner OT   Patient plan for discharge: None stated.   Current status: Participated in BUE exercise this am until she c/o indigestion/\"burping\" and declined further activity. Up in combilizer chair after standing with PT.  Barriers to return to prior living situation: Increased level of A needed with ADL and mobility, trached and vented.   Recommendations for discharge: ARC.  Rationale for recommendations: Patient would benefit from intense therapy to assist in returning patient to baseline.       Entered by: Alisha Daniel 01/28/2019 10:49 AM     "

## 2019-01-28 NOTE — PROGRESS NOTES
[facility]     Continuing to follow along for possible LTACH admission when patient is deemed medically stable for transfer.    I have obtained insurance authorization from [facility] for admission to [place].    Referral status update provided to CM/[facility].    Thank you for the referral, please feel free to contact me with any questions or concerns.         Thank you,    Gabby Valenzuela  Referral Specialist  [facility]   [email or web address]  [phone] (direct)

## 2019-01-28 NOTE — PROGRESS NOTES
Date of Intubation (most recent): 01/24/2019   Trach- 01/25/2019   Reason for Mechanical Ventilation: Respiratory Failure- Failed extubation.    Number of Days on Mechanical Ventilation: 4   Met Criteria for Pressure Support Trial: No   Reason for No Pressure Support Trial: Per MD    Ventilation Mode: CMV/AC  (Continuous Mandatory Ventilation/ Assist Control)  FiO2 (%): 40 %  Rate Set (breaths/minute): 16 breaths/min  Tidal Volume Set (mL): 480 mL  PEEP (cm H2O): 5 cmH2O  Oxygen Concentration (%): 40 %    Recent Labs   Lab 01/24/19  0837   PH 7.50*   PCO2 33*   PO2 33*   HCO3 26   plan: continue full vent support tonight

## 2019-01-28 NOTE — DISCHARGE SUMMARY
"Discharge Summary    Priya Funez MRN# [id number]   YOB: 1940 Age: 78 year old     Date of Admission:  1/4/2019  Date of Discharge:  1/29/2019  Admitting Physician:  Jose Winslow MD  Discharge Physician:  Jose Winslow, *  Discharging Service:  Cardiovascular and Thoracic Surgery  Preoperative Weight:  77.1 kg  Discharge Weight:  100 kg     Primary Provider: Alecia Millan          Admission Diagnoses:   Dissecting aneurysm of thoracic aorta, Juan Manuel type A (H) [I71.01]          Discharge Diagnosis:   Patient Active Problem List   Diagnosis     Benign essential hypertension     Postmenopausal     SOB (shortness of breath)     Ascending aortic dissection (H)                Discharge Disposition:   Discharged to long-term care facility           Condition on Discharge:   Discharge condition: Good   Discharge vitals: Blood pressure 132/65, pulse 69, temperature 99.9  F (37.7  C), resp. rate 16, height 1.626 m (5' 4\"), weight 100 kg (220 lb 7.4 oz), SpO2 97 %.     Code status on discharge: Full Code           Procedures:   1. Left groin cutdown with exposure of the femoral vessels with arterial cannulation for cardiopulmonary bypass. Right groin cutdown with exposure of the femoral vessels with the right femoral artery ultimately used for diagnostic angiography. 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 Visceral angiography. Repair of the bilateral femoral vessels on January 4, 2019 by Dr. Jose Winslow.    2. Left US guided thoracentesis on 1/11/2019 for 150 mL transudative fluid removed.    3. AV node ablation and permanent pacemaker placement on 1/11/2019 by Dr. Tsering Davey.    4. Flex sigmoid colonsoscopy on 1/15/2019 by Dr. David Parks.    5. Right US guided thoracentesis on 1/24/2019 for 400 mL transudative fluid removed.    6. Tracheostomy with Shiley #6 cuffed nonfenestrated " tube on 1/25/2019 by Dr. Bryson Mcclelland.    7. PEG tube placement on 1/25/2019 by Dr. Aaron Castañeda    8. Midline PIV placement on 1/29/2019            Medications Prior to Admission:     Medications Prior to Admission   Medication Sig Dispense Refill Last Dose     aspirin (ASA) 81 MG EC tablet Take 81 mg by mouth every 24 hours   1/4/2019     cholecalciferol (VITAMIN D-1000 MAX ST) 1000 units TABS Take 1,000 Units by mouth daily    1/4/2019     [DISCONTINUED] triamterene-HCTZ (MAXZIDE-25) 37.5-25 MG tablet Take 0.5 tablets by mouth daily    1/4/2019                  Discharge Medications:        Review of your medicines      START taking      Dose / Directions   acetaminophen 325 MG tablet  Commonly known as:  TYLENOL  Used for:  S/P aortic dissection repair      Dose:  650 mg  Take 2 tablets (650 mg) by mouth every 4 hours as needed for other (multimodal surgical pain management along with NSAIDS and opioid medication as indicated based on pain control and physical function.)  Refills:  0     albuterol (2.5 MG/3ML) 0.083% neb solution  Commonly known as:  PROVENTIL  Used for:  S/P aortic dissection repair      Dose:  2.5 mg  Take 1 vial (2.5 mg) by nebulization every 4 hours as needed for wheezing  Refills:  0     dextrose 50 % injection  Used for:  S/P aortic dissection repair      Dose:  25-50 mL  Inject 25-50 mLs into the vein every 15 minutes as needed for low blood sugar  Refills:  0     ferrous sulfate 300 (60 Fe) MG/5ML syrup  Used for:  S/P aortic dissection repair      Dose:  220 mg  Take 3.67 mLs (220 mg) by mouth daily  Quantity:  110.1 mL  Refills:  0     glucagon 1 MG Solr injection  Used for:  S/P aortic dissection repair      Dose:  1 mg  Inject 1 mg Subcutaneous every 15 minutes as needed for low blood sugar (May repeat x 1 only)  Refills:  0     glucose 40 % (400 mg/mL) Gel gel  Used for:  S/P aortic dissection repair      Dose:  15-30 g  Take 15-30 g by mouth every 15 minutes as needed for low  blood sugar  Refills:  0     hydrogen peroxide 3 % solution  Used for:  S/P aortic dissection repair      Apply topically every hour as needed for wound care or other (trach care)  Refills:  0     insulin aspart 100 UNIT/ML pen  Commonly known as:  NovoLOG PEN  Used for:  S/P aortic dissection repair      Dose:  1-6 Units  Inject 1-6 Units Subcutaneous every 4 hours  Quantity:  10.8 mL  Refills:  0     ipratropium - albuterol 0.5 mg/2.5 mg/3 mL 0.5-2.5 (3) MG/3ML neb solution  Commonly known as:  DUONEB  Used for:  S/P aortic dissection repair      Dose:  3 mL  Take 1 vial (3 mLs) by nebulization every 4 hours as needed for wheezing  Refills:  0     melatonin 5 MG sublingual tablet  Used for:  S/P aortic dissection repair      Dose:  5 mg  Place 1 tablet (5 mg) under the tongue At Bedtime  Quantity:  30 tablet  Refills:  0     metoprolol tartrate 25 MG tablet  Commonly known as:  LOPRESSOR  Used for:  S/P aortic dissection repair      Dose:  25 mg  1 tablet (25 mg) by Per Feeding Tube route 2 times daily  Quantity:  60 tablet  Refills:  0     miconazole 2 % external powder  Commonly known as:  MICATIN/MICRO GUARD  Used for:  S/P aortic dissection repair      Apply topically 2 times daily  Refills:  0     oxyCODONE 5 MG tablet  Commonly known as:  ROXICODONE  Used for:  S/P aortic dissection repair      Dose:  5-10 mg  Take 1-2 tablets (5-10 mg) by mouth every 4 hours as needed  Refills:  0     pantoprazole 2 mg/mL Susp suspension  Commonly known as:  PROTONIX  Used for:  S/P aortic dissection repair      Dose:  40 mg  Take 20 mLs (40 mg) by mouth every morning (before breakfast)  Quantity:  600 mL  Refills:  0     * QUEtiapine 25 MG tablet  Commonly known as:  SEROquel  Used for:  S/P aortic dissection repair      Dose:  25 mg  1 tablet (25 mg) by Oral or NG Tube route every 6 hours as needed (agitation, delirium)  Refills:  0     * QUEtiapine 25 MG tablet  Commonly known as:  SEROquel  Used for:  S/P aortic  dissection repair      Dose:  25 mg  Take 1 tablet (25 mg) by mouth 2 times daily  Quantity:  60 tablet  Refills:  0     * warfarin 2 MG tablet  Commonly known as:  COUMADIN  Used for:  S/P aortic dissection repair      Dose:  2 mg  Take 1 tablet (2 mg) by mouth daily  Refills:  0     * Warfarin Therapy Reminder  Used for:  S/P aortic dissection repair      Dose:  1 each  1 each continuous prn  Refills:  0         * This list has 4 medication(s) that are the same as other medications prescribed for you. Read the directions carefully, and ask your doctor or other care provider to review them with you.            CONTINUE these medicines which have NOT CHANGED      Dose / Directions   aspirin 81 MG EC tablet  Commonly known as:  ASA      Dose:  81 mg  Take 81 mg by mouth every 24 hours  Refills:  0     VITAMIN D-1000 MAX ST 1000 units Tabs  Generic drug:  cholecalciferol      Dose:  1000 Units  Take 1,000 Units by mouth daily  Refills:  0        STOP taking    triamterene-HCTZ 37.5-25 MG tablet  Commonly known as:  MAXZIDE-25              Where to get your medicines      Information about where to get these medications is not yet available    Ask your nurse or doctor about these medications    oxyCODONE 5 MG tablet                  Consultations:   Cardiac Rehab             Brief History of Illness:   Ms. Funez is a 78-year-old female who presented to the Emergency Department at [facility] earlier on January 4, 2019 with back and jaw pain.  CT angiography revealed a type A aortic dissection with extensive flap extending all the way to the level of the iliac bifurcation.  There was additionally concern on the CT scan for nonperfusion of the right renal artery as well as nonperfusion of the superior mesenteric artery.  An extensive discussion was had with the patient and the family regarding the expected outcome without treatment as well as the risks and benefits of operative intervention.  Following discussion  of risks and benefits, they elected to proceed with surgical intervention.          Hospital Course:   On 1/4/2019, Ms. Funez underwent successful:  1.  Left groin cutdown with exposure of the femoral vessels with arterial cannulation for cardiopulmonary bypass.   2.  Right groin cutdown with exposure of the femoral vessels with the right femoral artery ultimately used for diagnostic angiography.   3.  Replacement of the ascending aorta with a 34 mm Hemashield branch graft under deep hypothermic circulatory arrest.   4.  Aortic valve resuspension.   5.  Visceral angiography.   6.  Repair of the bilateral femoral vessels.     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  Initial ALEX confirmed dissection flap and moderate AI. The pericardial space was free of any blood.  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  The right femoral vessels were exposed; however, it was not possible to pass a wire up from the right common femoral artery, although this did not appear to be dissected.  As a result of an inability to pass a wire from the right groin, a left groin cutdown additionally was performed with exposure of the femoral vessels.  The arterial access was initially obtained at the left common femoral artery and bicaval venous cannulation was performed.  An ascending aortic replacement was performed with a 34 mm Hemashield graft with aortic valve resuspension.  Circulatory arrest time was 16 minutes.  Cross-clamp time was 68 minutes.  Cardiopulmonary bypass time was 132 minutes. There was no residual AI on post bypass ALEX.     Following completion of the ascending aortic repair, it became possible for Dr. Jennings to pass a wire from the right common femoral artery, presumably due to pressurization of the true lumen.  He then performed visceral angiography which appeared to reveal adequate perfusion of the superior mesenteric artery.       Patient was  successfully extubated on POD #1 but subsequently developed acute respiratory failure on 1/13 despite attempts at optimization on bipap and high flow oxygen and was reintubated. Of note, she had a left US guided thoracentesis on 1/11 for 150 mL removal. She was extubated to bipap again on 1/18 and was managed on high flow nasal cannula and bipap. On 1/24 a right US guided  thoracentesis was performed with 400 mL of fluid removed. Several hours later she developed worsening hypoxic respiratory failure and was reintubated. On 1/25/2019, a combined surgery for tracheostomy and PEG placement was performed. She has not yet tolerated PS trials 2/2 anxiety. Current ventilator settings: Assist control: .4/480/16/5. Please note: CT of the chest on 1/14 demonstrated RLL compressive atelectasis 2/2 elevated hemidiaphragm (present preoperatively) and fluid in fissures.     Patient developed postoperative atrial fibrillation and despite attempts at rate control with amiodarone and metoprolol, ultimately underwent AVN ablation and permanent pacemaker placement on 1/11/2019 by Dr. FUAD Davey. She continues on oral amiodarone and warfarin with a goal INR of 2-3. She is 100% VVI paced at 80 bpm with an underlying atrial flutter. Additionally, patient was noted to have a right internal jugular thrombus from a previous central line as well as superficial right arm thrombophlebitis 2/2 peripheral IV access.    Ms. Funez developed mixed delirium postoperatively which is currently managed with twice daily dosing of seroquel. She had been on a precedex drip which has been off for greater than 24 hours. Her mentation continues to clear however she still continues to have bouts of confusion. Pain is well controlled with prn tylenol. Additionally, patient is deconditioned after surgery and has been working with PT/OT/and speech therapies.     Ms. Funez developed an IMAN postoperatively. Her baseline creatinine is ~0.8 and today upon discharge is  1.31. She continues to be up 23 kg from her preoperative weight. Nephrology has been dosing diuretics on a daily basis according to renal function. Today prior to discharge, she received 40 mg of lasix IV. The goal is to diurese 1-2 pounds every 24 hours as she tolerates. She is slightly hypernatremic and free water has been increased to adjust for this. She will be discharged with 150 mL free water q4 hours via PEG tube.  Renogram (1/3) showed decreased renal function bilaterally (estimated 34% of the right kidney, 66% function of the left). Coello catheter was removed on 1/21 and was replaced on 1/24 when reintubated.  Patient was discovered to have a +UA on 1/24 and was treated with a five day course of ceftriaxone. She will discharge with a coello catheter.    Patient is malnourished and has been receiving tube feedings via PEG tube (placed on 1/25/2019). She has been tolerating feedings well. There was question of microscopic colitis which was treated with a five day course of oral prednisone; flex sig biopsy for this returned negative. She had loose stools in her postoperative course and was negative for c.diff on 1/12 and 1/14. Loose stools have improved and she is having bowel movements one to two times per day.     Patient was transiently hyperglycemic and treated with insulin infusion then transitioned to sliding scale insulin per protocol. Preoperative A1c 5.3.  Blood sugars remained stable with prn sliding scale coverage.     Ms. Funez continues to have acute blood loss anemia and her hemoglobin upon discharge is 7.4 with a platelet count of 255. She did receive several units of blood products perioperatively.    On day of discharge, patient's pain was well controlled, was working well with therapies and stable for discharge to [facility] on POD # 25. She does not have coronary artery disease and therefore will not discharge on a statin but should continue a baby ASA and beta  "blocker.    Echocardiogram on 1/7/2019:  \"Interpretation Summary:   The left ventricular cavity is small.  Left ventricular systolic function is normal.  The visual ejection fraction is estimated at 55-60%.  Catheter is seen in IVC  Technically difficult, suboptimal study.\"        Last Comprehensive Metabolic Panel:  Sodium   Date Value Ref Range Status   01/29/2019 147 (H) 133 - 144 mmol/L Final     Potassium   Date Value Ref Range Status   01/29/2019 4.0 3.4 - 5.3 mmol/L Final     Chloride   Date Value Ref Range Status   01/29/2019 112 (H) 94 - 109 mmol/L Final     Carbon Dioxide   Date Value Ref Range Status   01/29/2019 28 20 - 32 mmol/L Final     Anion Gap   Date Value Ref Range Status   01/29/2019 7 3 - 14 mmol/L Final     Glucose   Date Value Ref Range Status   01/29/2019 128 (H) 70 - 99 mg/dL Final     Urea Nitrogen   Date Value Ref Range Status   01/29/2019 75 (H) 7 - 30 mg/dL Final     Creatinine   Date Value Ref Range Status   01/29/2019 1.31 (H) 0.52 - 1.04 mg/dL Final     GFR Estimate   Date Value Ref Range Status   01/29/2019 39 (L) >60 mL/min/[1.73_m2] Final     Comment:     Non  GFR Calc  Starting 12/18/2018, serum creatinine based estimated GFR (eGFR) will be   calculated using the Chronic Kidney Disease Epidemiology Collaboration   (CKD-EPI) equation.       Calcium   Date Value Ref Range Status   01/29/2019 8.1 (L) 8.5 - 10.1 mg/dL Final     Lab Results   Component Value Date    WBC 6.6 01/29/2019     Lab Results   Component Value Date    RBC 2.50 01/29/2019     Lab Results   Component Value Date    HGB 7.4 01/29/2019     Lab Results   Component Value Date    HCT 23.6 01/29/2019     No components found for: MCT  Lab Results   Component Value Date    MCV 94 01/29/2019     Lab Results   Component Value Date    MCH 29.6 01/29/2019     Lab Results   Component Value Date    MCHC 31.4 01/29/2019     Lab Results   Component Value Date    RDW 18.3 01/29/2019     Lab Results   Component " Value Date     01/29/2019     INR   Date Value Ref Range Status   01/29/2019 2.32 (H) 0.86 - 1.14 Final                 Pending Results:   None           Discharge Instructions and Follow-Up:   Discharge diet: Regular   Discharge activity: Daily weights: Call if weight gain 2-3 lbs over 24 hours or if greater than 5 lbs in 1 week.  No lifting more than 10 lbs for 8-12 weeks.  No driving for 1 month.  Call for pain medication refill.  Call for temperature greater than 101.0  Daily shower with antibacterial soap.   Discharge follow-up: *Follow up primary care provider in 7-10 days after discharge in order to review your medication, vital signs, obtain any necessary lab work your doctor may want, and to update them on your hospitalization and medical issues.   *Follow up with CV surgery at [facility] at [facility] [address] when able. If any questions or concerns call [phone].  You will see us once at this visit and then if everything is going well you will not need to see us again.  You will follow long term with your cardiologist.   *Follow up with Dr. Smith, cardiologist, on 3/28/2019 at 2:15. This is who you will follow with long term about your heart issues. [phone].          Home Care agency: None    Supplies and equipment: None   Lines and drains: None    Wound care: Wash incision daily with antibacterial soap  Pat dry  No lotions, oils or creams to incision site   Other instructions: None       GRZEGORZ García, CCRN-CSC  Pager: [phone]

## 2019-01-28 NOTE — PROGRESS NOTES
[facility]  Cardiovascular and Thoracic Surgery Daily Note          Assessment and Plan:     POD#24 s/p Left groin cutdown with exposure of the femoral vessels with arterial cannulation for cardiopulmonary bypass. Right groin cutdown with exposure of the femoral vessels with the right femoral artery ultimately used for diagnostic angiography. 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 Visceral angiography. Repair of the bilateral femoral vessels by Dr. Jose Winslow.    Main Plans for today:  1. Trial off dex drip (needs to be off 24 hours for LTACH)  2. Work with therapies  3. PS trials per ccare    CVS:   Stable VS, HR 80/100% VVI paced  Echo on 1/7/29 demonstrated an LVEF 55-60%  On ASA, BB   QUINTEN s/p AVN ablation and PPM placement on 1/11/19. On oral amiodarone. Heparin drip for AFib. Cont coumadin today.  Of note has superficial right arm thrombophlebits and right internal jugular thrombus 2/2 previous central line.  Pulm:  Extubated per protocol and reintubated on POD #8 for resp distress. Extubated POD #14 and reintubated POD #20. Trach POD#21.  CT chest on 1/14 demonstrates RLL compressive atelectasis 2/2 elevated hemidiaphragm (present preoperatively). CXR/CT chest with fluid in fissures.   POD #7 left US guided thoracentesis with removal of 150 mL fluid.   POD #20 right US guided thoracentesis with removal of 400 mL of fluid.  PS trials per critical care  Neuro:  Grossly intact, follows commands. Intermittently confused and anxious. Mixed delirium, starting to clear. On seroquel.  Pain and sedation well managed with current regimen - trial off of precedex - needs to be off x 24 hours for LTACH placement  Deconditioned - working with PT/OT/speech  Renal:  IMAN -BL Creat ~0.8, 1.5 today, stable  Renal US on 1/6 with patent flow in bilateral renal arteries. Right kidney atrophic.  Renal on board -  appreciate. Per neph, goal is to diurese 0.5 to 1 pound per day as tolerated.  Fluid overload with significant edema.  BMP in AM  GI:  Malnourished. Receiving tube feedings via PEG tube placed on POD #21  Five day course of oral prednisone completed for suspected microscopic colitis, flex sig biopsy negative. Loose stools improving.  CT abdomen on  did not demonstrate acute pathology.  Dysphagia 2/2 deconditioning.   and  negative for c. Diff  On PPI  :  Barrientos present - strict I/O, limited mobility (d/c on  and replaced on )  Endo:   Preop A1C 5.3; managed on sliding scale insulin with goal BG <180  FEN:   Replace lytes as needed; Continue tube feedings  ID:   Temp (24hrs), Av.7  F (37.6  C), Min:98.1  F (36.7  C), Max:100  F (37.8  C)  + UA on , treated with Ceftriaxone  Sputum + candida, on nystatin; BC negative  Heme:   Acute blood loss anemia and thrombocytopenia: Hgb 7.4; Plt 279  Tubes/Drains:  Left internal jugular  Barrientos  PEG  Trach  Proph:   BB, ASA, coumadin, PCD, PPI  Dispo:   Continue in ICU with plans to transfer to [facility] within 1-2 days          Interval History:   Lying in bed, states pain is well managed on current regimen. Slept fair overnight. Is passing flatus + BM.  Breathing is comfortable on ventilator.Working with therapies. Denies sternal popping or clicking. Patient and family anxious for LTACH transition.         Medications:       albumin human  12.5 g Intravenous Once     aspirin  81 mg Per Feeding Tube Daily     furosemide  40 mg Intravenous Once     influenza Vac Split High-Dose  0.5 mL Intramuscular Prior to discharge     insulin aspart  1-6 Units Subcutaneous Q4H     lidocaine (PF)  10 mL Subcutaneous Once     melatonin  5 mg Sublingual At Bedtime     metoprolol tartrate  25 mg Per Feeding Tube BID     miconazole   Topical BID     nystatin  500,000 Units Oral 4x Daily     pantoprazole  40 mg Oral QAM AC     potassium chloride  20 mEq Oral Daily  "    QUEtiapine  25 mg Oral BID     sodium chloride (PF)  10 mL Intracatheter Q8H     warfarin  3 mg Oral ONCE at 18:00     acetaminophen, albuterol, IV fluid REPLACEMENT ONLY, glucose **OR** dextrose **OR** glucagon, heparin lock flush, hydrALAZINE, hydrogen peroxide, HYDROmorphone, ipratropium - albuterol 0.5 mg/2.5 mg/3 mL, lidocaine 4%, lidocaine (buffered or not buffered), lidocaine (buffered or not buffered), magnesium sulfate, magnesium sulfate, - MEDICATION INSTRUCTIONS -, - MEDICATION INSTRUCTIONS -, methocarbamol, naloxone, ondansetron **OR** ondansetron, oxyCODONE IR, potassium chloride, potassium chloride with lidocaine, potassium chloride, potassium chloride, potassium chloride, QUEtiapine, sodium chloride (PF), sodium chloride (PF), sodium chloride (PF), [COMPLETED] sodium phosphate **FOLLOWED BY** sodium phosphate, sodium phosphate, sodium phosphate, sodium phosphate, sodium phosphate, Warfarin Therapy Reminder          Physical Exam:   Vitals were reviewed  Blood pressure 128/82, pulse 69, temperature 99.9  F (37.7  C), temperature source Bladder, resp. rate 20, height 1.626 m (5' 4\"), weight 101 kg (222 lb 10.6 oz), SpO2 95 %.  Rhythm: 100% VVI paced on bedside monitor    Lungs: CTA anteriorly, trach'd on ventilator    Cardiovascular: S1, S2, RRR, no m/r/g.     Abdomen: PEG tube patent, CDI; S/NT/ND, + BS    Extremeties: 2+ BLE edema, 1+ palp pedal pulses    Incision(s): Sternal incision CDI; left chest wall PPM steri strips CDI    CT: N/A    Weight:   Vitals:    01/24/19 0534 01/25/19 0500 01/26/19 0500 01/27/19 0600   Weight: 96.6 kg (212 lb 15.4 oz) 98.4 kg (216 lb 14.9 oz) 99.2 kg (218 lb 11.1 oz) 100.1 kg (220 lb 10.9 oz)    01/28/19 0500   Weight: 101 kg (222 lb 10.6 oz)            Data:    All labs and imaging reviewed by me.  Labs:   Lab Results   Component Value Date    WBC 5.9 01/28/2019     Lab Results   Component Value Date    RBC 2.53 01/28/2019     Lab Results   Component Value Date    " HGB 7.4 01/28/2019     Lab Results   Component Value Date    HCT 23.3 01/28/2019     No components found for: MCT  Lab Results   Component Value Date    MCV 92 01/28/2019     Lab Results   Component Value Date    MCH 29.2 01/28/2019     Lab Results   Component Value Date    MCHC 31.8 01/28/2019     Lab Results   Component Value Date    RDW 18.0 01/28/2019     Lab Results   Component Value Date     01/28/2019       Last Basic Metabolic Panel:  Lab Results   Component Value Date     01/28/2019      Lab Results   Component Value Date    POTASSIUM 3.7 01/28/2019     Lab Results   Component Value Date    CHLORIDE 110 01/28/2019     Lab Results   Component Value Date    BESS 7.9 01/28/2019     Lab Results   Component Value Date    CO2 27 01/28/2019     Lab Results   Component Value Date    BUN 83 01/28/2019     Lab Results   Component Value Date    CR 1.50 01/28/2019     Lab Results   Component Value Date     01/28/2019       CXR: No new imaging today    GRZEGORZ García, CCRN-CSC  CV Surgery  [place] Pager: [phone]  Personal Pager: [phone]

## 2019-01-28 NOTE — PROGRESS NOTES
[facility]  Critical Care Service  Progress Note  Date of Service (when I saw the patient): 01/28/2019     Assessment & Plan    Priya Funez is a 78 year old female with PMH of HTN admitted on 1/4/2019 with sudden onset of back pain found to have type A aortic dissection and sent for emergency aortic dissection repair with graft.  She was admitted to ICU for ongoing care.    Today's events and changes   Stop fentanyl gtt  Seroquel BID   Remove IJ    Stop ABX   RNCC Consult for transfer   Stop scheduled nebs        Interim  events:   1/5: did well overnight.  Epi weaned off; requiring low dose phenylephrine.  Hgb stable.  Extubated in the afternoon.   1/6:poor inspiratory effort and weak cough through the day.     Placed on Bipap overnight.NTG gtt for improved BP control.  Rigors and fever  103  1/7: Fever resolved;    stable respiratory status on HFNC.SvO2 48; getting echocardiogram. EF 55-60%   1/8: Rapid A Fib  1/9: Titrating down FiO2; improved ABG and VBG   1/11-12, hypoxic requiring bipap with little reserve, low UO   fluid resuscitated  with albumin blood and lasix  1/13: re-intubated late on 1/12; significant metabolic acidosis, increased work of breathing   1/18/19: extubated to BIPAP  1/24/19 Thoracentesis. Worsening hypoxic respiratory failure. Reintubation.   1/25/19 Trach and PEG placement      Neuro  1. ICU delirium, improving  2. Acute pain  Plan:  -- Limit sedation, requiring low dose precedex. Wean as able   -- scheduled seroquel, 25 mg BID  PRN available   -- Melatonin HS   -- Maintain circadian rhythm.  Lights on during the day.  Off at night, minimize cares at night.  OOB during the day.      CV  1. S/p Repair of Type A aortic dissection repair 1/4 with Davis Little, and Michaela  2. HTN  3. Afib with RVR, uncontrolled with medications now s/p AV node ablation and pacemaker placement 1/11/19  4. Hypotension, resolved    Plan:  --CV Surgery and Vascular surgery primary    --Monitor hemodynamics.  Maintain SBP<130; MAP >65  --Echocardiogram 1/7; EF 50-55%; IVC not dilated, ventricles small   --Warfarin for Afib, continue heparin gtt until INR therapeutic on Warfari  --Diureses as below      Resp:  1. Acute hypoxemic /hypercapnic respiratory failure; re-intubated 1/12. Extubated 1/18, reintubated 1/24, trach and PEG'ed 1/25  2. Bilateral pleural effusion   -- CT CAP 1/14 with moderate amount of fluid in fissures, interstitial edema; paralyzed right hemidiaphragm   -- Subsequent CXR showing persistent bilateral pleural effusions R > L.    Plan:   -- CMV:  16/480/5/70%.   -- Wean FiO2 as able   -- PRN duonebs  --Thoracentesis performed on 1/24. ~ 400 ml removed from right.   -- Daily PSTs, 2 hours twice a day    GI/Nutrition  1. Malnutrition, nia/pro deficits  2. diarrhea; presumed colitis. Resolved   Plan:  --GI consulted. Flex sig performed 1/15; diverticulum look fine, mucosa inflamed/congested; favoring colitis. No ischemia noted.  Biopsy showing-- Nonneoplastic colonic mucosa with microscopic lymphoid aggregate, no evidence of microscopic colitis, inflammatory bowel disease or malignancy.  GI recommends Budesenide, 9mg daily. Unable to give via feeding tube. Completed  5 day course oral prednisone    -- TF are at goal  -- Bowel protocol       Renal  1. IMAN; abdominal aortic dissection involving right renal artery.  Atrophic right kidney with decreased flow. No prior hx.  Baseline creatinine appears to be 0.8;   --Lasix drip stopped this 1/14; patient requiring vasopressors due to hypotension following >24 hours of diuretic drip; worsening renal function; metabolic alkalosis. Was placed on scheduled diuretics thereafter   --Renogram 1/7.  Renal US 1/6 with flow present in both R/L renal arteries. Right kidney atrophic.    Plan:  --Nephrology following, Plan for 40 mg IV lasix and 12.5 of albumin today   --Goal net even or net negative ~1 liter a day      ID  1. Presumptive  UTI  --Brittany-op Cefepime and Vancomycin; completed    --Cefepime completed 6 days  --Vanco and millie 1/12--1/17    -- On ceftriaxone (1/25-1/28).    Plan:  -- Thoracentesis cultures sent- NGTD   -- Blood cultures x2- NGTD   -- Sputum/BAL- NGTD  -- Stop ceftriaxone today to complete 3 day course     Endocrine  1. Stress Hyperglycemia  Plan:  --BG check Q 4 hours. Medium resistance sliding scale insulin as needed      Heme:  1. Acute blood loss anemia  2. Thrombocytopenia; resolved   3. Superficial thrombophlebitis in the right basilic vein   4. Right internal jugular thrombus   Plan:  --Monitor hemoglobin. Transfuse to keep > 7.0.   --Warfarin for A Fib and right internal jugular thrombus;  Reversed 1/23 for thora and now for trach and PEG. Placed on heparin gtt and coumadin. Continue heparin gtt until INR at goal   --US lower extremities to assess for DVT; no evidence of DVT      MSK/Skin  1. Sternotomy   Plan:  -- Sternal precautions  -- Wound care per protocol      General cares:  DVT Prophylaxis: continue PCD's; heparin gtt.   GI Prophylaxis: PPI  Restraints: Restraints for medical healing needed: NO  Family update by me today: Yes        Jonas Faustin, NP-C     Time Spent on this Encounter   Billing:  I spent 45 minutes bedside and on the inpatient unit today managing the critical care of Priya Funez in relation to the issues listed in this note.    Physical Exam   Temp: 99.9  F (37.7  C) Temp src: Bladder Temp  Min: 98.1  F (36.7  C)  Max: 100  F (37.8  C) BP: 128/82 Pulse: 69 Heart Rate: 69 Resp: 20 SpO2: 95 % O2 Device: Mechanical Ventilator    Vitals:    01/26/19 0500 01/27/19 0600 01/28/19 0500   Weight: 99.2 kg (218 lb 11.1 oz) 100.1 kg (220 lb 10.9 oz) 101 kg (222 lb 10.6 oz)     GEN: NAD. Following commands   EYES: PERRL,  Anicteric sclera.   HEENT:  Normocephalic, atraumatic, trachea midline  CV: RRR,   PULM/CHEST:  Course anterior breath sounds bilaterally.   GI: soft + bs   : coello catheter in  place, urine yellow and clear  EXTREMITIES: +++ peripheral edema moving all extremities, peripheral pulses intact  NEURO: glen follows commands   SKIN: warm and dry  Imaging personally reviewed:  CXR   EC% V-paced     Data   Recent Labs   Lab 19  0405 19  0430 19  0005 19  0405  19  1730  19  0415   WBC 5.9 6.6  --  8.9   < >  --    < > 11.9*   HGB 7.4* 7.4*  --  7.6*   < >  --    < > 7.6*   MCV 92 90  --  89   < >  --    < > 91    298 299 322   < >  --    < > 336   INR 1.94* 1.68*  --  1.71*   < >  --    < > 3.37*   * 142  --  140   < > 141   < > 139   POTASSIUM 3.7 4.0  --  4.8   < > 4.9   < > 3.3*   CHLORIDE 110* 106  --  105   < > 104   < > 102   CO2 27 28  --  26   < > 27   < > 29   BUN 83* 91*  --  91*   < > 83*   < > 74*   CR 1.50* 1.73*  --  1.85*   < > 1.75*   < > 1.30*   ANIONGAP 8 8  --  9   < > 10   < > 8   BESS 7.9* 8.2*  --  8.2*   < > 8.0*   < > 8.8   * 119*  --  102*   < > 148*   < > 140*   ALBUMIN 2.7*  --   --   --   --  3.1*  --   --    PROTTOTAL 5.6*  --   --   --   --   --   --  6.2*   BILITOTAL 0.8  --   --   --   --   --   --   --    ALKPHOS 98  --   --   --   --   --   --   --    ALT 65*  --   --   --   --   --   --   --    AST 34  --   --   --   --   --   --   --     < > = values in this interval not displayed.

## 2019-01-29 VITALS
RESPIRATION RATE: 16 BRPM | HEART RATE: 68 BPM | WEIGHT: 220.46 LBS | OXYGEN SATURATION: 97 % | BODY MASS INDEX: 37.64 KG/M2 | TEMPERATURE: 98.5 F | SYSTOLIC BLOOD PRESSURE: 128 MMHG | DIASTOLIC BLOOD PRESSURE: 74 MMHG | HEIGHT: 64 IN

## 2019-01-29 LAB
ANION GAP SERPL CALCULATED.3IONS-SCNC: 7 MMOL/L (ref 3–14)
BACTERIA SPEC CULT: NO GROWTH
BUN SERPL-MCNC: 75 MG/DL (ref 7–30)
CALCIUM SERPL-MCNC: 8.1 MG/DL (ref 8.5–10.1)
CHLORIDE SERPL-SCNC: 112 MMOL/L (ref 94–109)
CO2 SERPL-SCNC: 28 MMOL/L (ref 20–32)
CREAT SERPL-MCNC: 1.31 MG/DL (ref 0.52–1.04)
ERYTHROCYTE [DISTWIDTH] IN BLOOD BY AUTOMATED COUNT: 18.3 % (ref 10–15)
GFR SERPL CREATININE-BSD FRML MDRD: 39 ML/MIN/{1.73_M2}
GLUCOSE BLDC GLUCOMTR-MCNC: 120 MG/DL (ref 70–99)
GLUCOSE BLDC GLUCOMTR-MCNC: 145 MG/DL (ref 70–99)
GLUCOSE BLDC GLUCOMTR-MCNC: 154 MG/DL (ref 70–99)
GLUCOSE BLDC GLUCOMTR-MCNC: 156 MG/DL (ref 70–99)
GLUCOSE SERPL-MCNC: 128 MG/DL (ref 70–99)
HCT VFR BLD AUTO: 23.6 % (ref 35–47)
HGB BLD-MCNC: 7.4 G/DL (ref 11.7–15.7)
INR PPP: 2.32 (ref 0.86–1.14)
MCH RBC QN AUTO: 29.6 PG (ref 26.5–33)
MCHC RBC AUTO-ENTMCNC: 31.4 G/DL (ref 31.5–36.5)
MCV RBC AUTO: 94 FL (ref 78–100)
PLATELET # BLD AUTO: 255 10E9/L (ref 150–450)
POTASSIUM SERPL-SCNC: 4 MMOL/L (ref 3.4–5.3)
RBC # BLD AUTO: 2.5 10E12/L (ref 3.8–5.2)
SODIUM SERPL-SCNC: 147 MMOL/L (ref 133–144)
SPECIMEN SOURCE: NORMAL
WBC # BLD AUTO: 6.6 10E9/L (ref 4–11)

## 2019-01-29 PROCEDURE — 25000132 ZZH RX MED GY IP 250 OP 250 PS 637: Performed by: INTERNAL MEDICINE

## 2019-01-29 PROCEDURE — 85610 PROTHROMBIN TIME: CPT | Performed by: PHYSICIAN ASSISTANT

## 2019-01-29 PROCEDURE — 85027 COMPLETE CBC AUTOMATED: CPT | Performed by: PHYSICIAN ASSISTANT

## 2019-01-29 PROCEDURE — 40000008 ZZH STATISTIC AIRWAY CARE

## 2019-01-29 PROCEDURE — 25000125 ZZHC RX 250: Performed by: NURSE PRACTITIONER

## 2019-01-29 PROCEDURE — 25000128 H RX IP 250 OP 636: Performed by: INTERNAL MEDICINE

## 2019-01-29 PROCEDURE — 40000275 ZZH STATISTIC RCP TIME EA 10 MIN

## 2019-01-29 PROCEDURE — 25000132 ZZH RX MED GY IP 250 OP 250 PS 637: Performed by: NURSE PRACTITIONER

## 2019-01-29 PROCEDURE — 25000128 H RX IP 250 OP 636: Performed by: NURSE PRACTITIONER

## 2019-01-29 PROCEDURE — 80048 BASIC METABOLIC PNL TOTAL CA: CPT | Performed by: PHYSICIAN ASSISTANT

## 2019-01-29 PROCEDURE — 00000146 ZZHCL STATISTIC GLUCOSE BY METER IP

## 2019-01-29 PROCEDURE — 25000132 ZZH RX MED GY IP 250 OP 250 PS 637: Performed by: STUDENT IN AN ORGANIZED HEALTH CARE EDUCATION/TRAINING PROGRAM

## 2019-01-29 PROCEDURE — P9047 ALBUMIN (HUMAN), 25%, 50ML: HCPCS | Performed by: INTERNAL MEDICINE

## 2019-01-29 PROCEDURE — 36569 INSJ PICC 5 YR+ W/O IMAGING: CPT

## 2019-01-29 PROCEDURE — 27211407 ZZ H KIT CATH IV 18 OR 20 G, POWERGLIDE BASIC

## 2019-01-29 PROCEDURE — 94003 VENT MGMT INPAT SUBQ DAY: CPT

## 2019-01-29 PROCEDURE — 25000132 ZZH RX MED GY IP 250 OP 250 PS 637: Performed by: THORACIC SURGERY (CARDIOTHORACIC VASCULAR SURGERY)

## 2019-01-29 RX ORDER — OXYCODONE HYDROCHLORIDE 5 MG/1
5-10 TABLET ORAL EVERY 4 HOURS PRN
Status: SHIPPED | DISCHARGE
Start: 2019-01-29 | End: 2019-07-09

## 2019-01-29 RX ORDER — IPRATROPIUM BROMIDE AND ALBUTEROL SULFATE 2.5; .5 MG/3ML; MG/3ML
3 SOLUTION RESPIRATORY (INHALATION) EVERY 4 HOURS PRN
DISCHARGE
Start: 2019-01-29 | End: 2019-07-09

## 2019-01-29 RX ORDER — QUETIAPINE FUMARATE 25 MG/1
25 TABLET, FILM COATED ORAL EVERY 6 HOURS PRN
DISCHARGE
Start: 2019-01-29 | End: 2019-07-01

## 2019-01-29 RX ORDER — ACETAMINOPHEN 325 MG/1
650 TABLET ORAL EVERY 4 HOURS PRN
DISCHARGE
Start: 2019-01-29 | End: 2019-12-19

## 2019-01-29 RX ORDER — ACETAMINOPHEN 160 MG
TABLET,DISINTEGRATING ORAL
DISCHARGE
Start: 2019-01-29 | End: 2019-07-01

## 2019-01-29 RX ORDER — ALBUMIN (HUMAN) 12.5 G/50ML
12.5 SOLUTION INTRAVENOUS ONCE
Status: COMPLETED | OUTPATIENT
Start: 2019-01-29 | End: 2019-01-29

## 2019-01-29 RX ORDER — QUETIAPINE FUMARATE 25 MG/1
25 TABLET, FILM COATED ORAL 2 TIMES DAILY
Qty: 60 TABLET | Refills: 0 | DISCHARGE
Start: 2019-01-29 | End: 2019-07-01

## 2019-01-29 RX ORDER — FUROSEMIDE 10 MG/ML
40 INJECTION INTRAMUSCULAR; INTRAVENOUS ONCE
Status: COMPLETED | OUTPATIENT
Start: 2019-01-29 | End: 2019-01-29

## 2019-01-29 RX ORDER — WARFARIN SODIUM 2 MG/1
2 TABLET ORAL DAILY
DISCHARGE
Start: 2019-01-29 | End: 2020-03-19

## 2019-01-29 RX ORDER — DEXTROSE MONOHYDRATE 25 G/50ML
25-50 INJECTION, SOLUTION INTRAVENOUS
DISCHARGE
Start: 2019-01-29 | End: 2019-07-01

## 2019-01-29 RX ORDER — ALBUTEROL SULFATE 0.83 MG/ML
2.5 SOLUTION RESPIRATORY (INHALATION) EVERY 4 HOURS PRN
DISCHARGE
Start: 2019-01-29 | End: 2019-07-09

## 2019-01-29 RX ORDER — NICOTINE POLACRILEX 4 MG
15-30 LOZENGE BUCCAL
DISCHARGE
Start: 2019-01-29 | End: 2019-07-01

## 2019-01-29 RX ORDER — LIDOCAINE 40 MG/G
CREAM TOPICAL
Status: DISCONTINUED | OUTPATIENT
Start: 2019-01-29 | End: 2019-01-29 | Stop reason: HOSPADM

## 2019-01-29 RX ORDER — WARFARIN SODIUM 2 MG/1
2 TABLET ORAL
Status: COMPLETED | OUTPATIENT
Start: 2019-01-29 | End: 2019-01-29

## 2019-01-29 RX ORDER — METOPROLOL TARTRATE 25 MG/1
25 TABLET, FILM COATED ORAL 2 TIMES DAILY
Qty: 60 TABLET | Refills: 0 | DISCHARGE
Start: 2019-01-29 | End: 2019-07-09

## 2019-01-29 RX ADMIN — ASPIRIN 81 MG 81 MG: 81 TABLET ORAL at 09:04

## 2019-01-29 RX ADMIN — MICONAZOLE NITRATE: 2 POWDER TOPICAL at 09:07

## 2019-01-29 RX ADMIN — POTASSIUM CHLORIDE 20 MEQ: 1.5 POWDER, FOR SOLUTION ORAL at 09:04

## 2019-01-29 RX ADMIN — ALBUMIN HUMAN 12.5 G: 0.25 SOLUTION INTRAVENOUS at 13:20

## 2019-01-29 RX ADMIN — WARFARIN SODIUM 2 MG: 2 TABLET ORAL at 16:38

## 2019-01-29 RX ADMIN — POTASSIUM CHLORIDE 20 MEQ: 1.5 POWDER, FOR SOLUTION ORAL at 06:07

## 2019-01-29 RX ADMIN — INSULIN ASPART 1 UNITS: 100 INJECTION, SOLUTION INTRAVENOUS; SUBCUTANEOUS at 09:05

## 2019-01-29 RX ADMIN — HYDROMORPHONE HYDROCHLORIDE 0.5 MG: 1 INJECTION, SOLUTION INTRAMUSCULAR; INTRAVENOUS; SUBCUTANEOUS at 06:11

## 2019-01-29 RX ADMIN — OXYCODONE HYDROCHLORIDE 5 MG: 5 TABLET ORAL at 16:25

## 2019-01-29 RX ADMIN — INSULIN ASPART 1 UNITS: 100 INJECTION, SOLUTION INTRAVENOUS; SUBCUTANEOUS at 00:24

## 2019-01-29 RX ADMIN — METOPROLOL TARTRATE 25 MG: 25 TABLET ORAL at 09:04

## 2019-01-29 RX ADMIN — LIDOCAINE HYDROCHLORIDE 1 ML: 10 INJECTION, SOLUTION INFILTRATION; PERINEURAL at 11:44

## 2019-01-29 RX ADMIN — ACETAMINOPHEN 650 MG: 325 TABLET, FILM COATED ORAL at 16:25

## 2019-01-29 RX ADMIN — Medication 40 MG: at 06:15

## 2019-01-29 RX ADMIN — NYSTATIN 500000 UNITS: 500000 SUSPENSION ORAL at 00:37

## 2019-01-29 RX ADMIN — INSULIN ASPART 1 UNITS: 100 INJECTION, SOLUTION INTRAVENOUS; SUBCUTANEOUS at 15:00

## 2019-01-29 RX ADMIN — FUROSEMIDE 40 MG: 10 INJECTION, SOLUTION INTRAVENOUS at 13:53

## 2019-01-29 RX ADMIN — MINERAL SUPPLEMENT IRON 300 MG / 5 ML STRENGTH LIQUID 100 PER BOX UNFLAVORED 220 MG: at 14:00

## 2019-01-29 RX ADMIN — QUETIAPINE 25 MG: 25 TABLET ORAL at 09:04

## 2019-01-29 RX ADMIN — NYSTATIN 500000 UNITS: 500000 SUSPENSION ORAL at 14:02

## 2019-01-29 RX ADMIN — NYSTATIN 500000 UNITS: 500000 SUSPENSION ORAL at 06:09

## 2019-01-29 ASSESSMENT — ACTIVITIES OF DAILY LIVING (ADL)
ADLS_ACUITY_SCORE: 16

## 2019-01-29 ASSESSMENT — MIFFLIN-ST. JEOR: SCORE: 1475

## 2019-01-29 NOTE — PLAN OF CARE
Occupational Therapy Discharge Summary    Reason for therapy discharge:    Discharged to LTACH     Progress towards therapy goal(s). See goals on Care Plan in [facility] electronic health record for goal details.  Goals not met.  Barriers to achieving goals:   discharge from facility.    Therapy recommendation(s):    Continued therapy is recommended.  Rationale/Recommendations:  To maximize I in ADL/IADL.

## 2019-01-29 NOTE — PROGRESS NOTES
[facility] ICU RESPIRATORY NOTE        Date of Admission: 1/4/2019  Date of Intubation (most recent): 1/24/2019 Trach-1/25/2019  Reason for Mechanical Ventilation: Respiratory failure; failed extubation  Number of Days on Mechanical Ventilation: 6  Met Criteria for Pressure Support Trial: No  Reason for No Pressure Support Trial: per MD    Significant Events Today: None overnight    ABG Results:   Recent Labs   Lab 01/24/19  0837   PH 7.50*   PCO2 33*   PO2 33*   HCO3 26       Current Vent Settings: Ventilation Mode: CMV/AC  (Continuous Mandatory Ventilation/ Assist Control)  FiO2 (%): 40 %  Rate Set (breaths/minute): 16 breaths/min  Tidal Volume Set (mL): 480 mL  PEEP (cm H2O): 5 cmH2O  Oxygen Concentration (%): 40 %  Resp: 18    Plan: Continue patient on full ventilatory support and assess for weaning readiness daily.

## 2019-01-29 NOTE — PROGRESS NOTES
I was asked to arrange transport to [facility] today. I confirmed with the RN the following. Vent with trach, feeding tube, no IV fluids but PICC line. I called [facility] and spoke to Payal and have a confirmed stretcher ride to [place] for 5 pm today, ventilator confirmed w/ Payal.

## 2019-01-29 NOTE — PROGRESS NOTES
[facility] ICU RESPIRATORY NOTE        Date of Admission: 1/4/2019    Date of Intubation (most recent): 1/24/2019    Reason for Mechanical Ventilation: Resp failure     Number of Days on Mechanical Ventilation: 4    ABG Results:   Recent Labs   Lab 01/24/19  0837   PH 7.50*   PCO2 33*   PO2 33*   HCO3 26       Current Vent Settings: Ventilation Mode: CMV/AC  (Continuous Mandatory Ventilation/ Assist Control)  FiO2 (%): 40 %  Rate Set (breaths/minute): 16 breaths/min  Tidal Volume Set (mL): 480 mL  PEEP (cm H2O): 5 cmH2O  Oxygen Concentration (%): 40 %  Resp: 16      Trach care site clean/inner cannula and dressing changed.    Plan: continue full ventilator support.    Mat Weaver

## 2019-01-29 NOTE — PLAN OF CARE
Patient stable with stable vital signs. Dex off this shift, scheduled seroquel given and patient is calm and cooperative. Plan of care reviewed with family throughout shift. Plan for transfer to [place] tomorrow.     Odette Collado RN

## 2019-01-29 NOTE — PROGRESS NOTES
Renal Medicine Progress Note             Assessment/Plan:         1.  Non-oliguric IMAN/CKD with significant hypervolemia   - ATN ( 2/2 AAA, along with renal vascular injury) .   Got 40 mg of Lasix yesterday with decent UOP of ~2 L . +160 ml on I/O but nearly 2 lbs down.   - Significant total volume up with extravascular seeping of fluid.   - Will give another Lasix 40 mg IV with 12.5 mg Albumin today . Target weight loss of 1-2 lb per day .     2.  S/p aortic dissection/repair.    A.  Per CV Surg.    3.  SOB/hypoxia/pneumonia - trach'ed , on vent . Stable    4.  Anemia. Hgb stable  A.  Follow labs.  B.  Transfuse prn.     5.  FEN.   Na trending up . Increase free water flush to 150 ml q 4 hrs.           Interval History:     Trach'ed , vent'ed. Breathing stable. OOB on chair. Feels better  Got 40 mg of Lasix yesterday with decent UOP of ~2 L . +160 ml on I/O but nearly 2 lbs down  Sodium trending up .     ROS - Afebrile. No nausea, vomiting. Hgb stable. No melena/ hematochezia. ROS limited d/t pt being on vent            Medications and Allergies:       aspirin  81 mg Per Feeding Tube Daily     ferrous sulfate  220 mg Oral Daily     influenza Vac Split High-Dose  0.5 mL Intramuscular Prior to discharge     insulin aspart  1-6 Units Subcutaneous Q4H     lidocaine (PF)  10 mL Subcutaneous Once     melatonin  5 mg Sublingual At Bedtime     metoprolol tartrate  25 mg Per Feeding Tube BID     miconazole   Topical BID     nystatin  500,000 Units Oral 4x Daily     pantoprazole  40 mg Oral QAM AC     potassium chloride  20 mEq Oral Daily     QUEtiapine  25 mg Oral BID     sodium chloride (PF)  10 mL Intracatheter Q8H     sodium chloride (PF)  10 mL Intracatheter Q8H     warfarin  2 mg Oral ONCE at 18:00     Allergies   Allergen Reactions     Amoxicillin Swelling     Ciprofloxacin Swelling          Physical Exam:     Vitals were reviewed  Heart Rate: 69, Blood pressure 132/65, pulse 69, temperature 99.9  F (37.7  C), resp.  "rate 16, height 1.626 m (5' 4\"), weight 100 kg (220 lb 7.4 oz), SpO2 97 %.  Wt Readings from Last 3 Encounters:   01/29/19 100 kg (220 lb 7.4 oz)   09/14/08 80.7 kg (178 lb)       GENERAL APPEARANCE: , awake, alert, interactive, on vent/ trach  HEENT:  Eyes/ears/nose/neck grossly nl , Trach +  RESP: + few wheezes, rhonchi , diminished breath sounds at bases  CV: RRR c 2/6 m, nl S1/S2, + pacer; sternal incision looks fine  ABDOMEN: o/s/nt/nd  EXTREMITIES/SKIN: 3+ edema  NEURO:  eyes open, moves ext, follows all commands properly  LINES:  + coello c yellow urine; + RIJ 3-lumen         Data:     CBC RESULTS:     Recent Labs   Lab 01/29/19  0420 01/28/19  0405 01/27/19  0430 01/27/19  0005 01/26/19  0405 01/25/19  0306 01/24/19  1403   WBC 6.6 5.9 6.6  --  8.9 12.9* 13.8*   RBC 2.50* 2.53* 2.54*  --  2.62* 2.27* 2.49*   HGB 7.4* 7.4* 7.4*  --  7.6* 6.6* 7.3*   HCT 23.6* 23.3* 22.8*  --  23.4* 20.4* 22.5*    279 298 299 322 425 407     Basic Metabolic Panel:  Recent Labs   Lab 01/29/19  0420 01/28/19  0405 01/27/19  0430 01/26/19  0405 01/25/19  0306 01/24/19  1730   * 145* 142 140 137 141   POTASSIUM 4.0 3.7 4.0 4.8 4.6 4.9   CHLORIDE 112* 110* 106 105 101 104   CO2 28 27 28 26 27 27   BUN 75* 83* 91* 91* 90* 83*   CR 1.31* 1.50* 1.73* 1.85* 1.90* 1.75*   * 148* 119* 102* 112* 148*   BESS 8.1* 7.9* 8.2* 8.2* 7.8* 8.0*     INR  Recent Labs   Lab 01/29/19  0420 01/28/19  0405 01/27/19  0430 01/26/19  0405   INR 2.32* 1.94* 1.68* 1.71*      Attestation:   I have reviewed today's relevant vital signs, notes, medications, labs and imaging.    Vikash Monroe MD  [facility] Nephrology   [phone]    "

## 2019-01-29 NOTE — DISCHARGE INSTRUCTIONS
Nutrition Support Enteral:  Type of Feeding Tube: PEG   Enteral Frequency:  Continuous  Enteral Regimen: Peptamen 1.5 at 50 mL/hr   Total Enteral Provisions: 1800 kcal (29 kcal/kg), 82 g protein (1.3 g/kg), 924 mL H2O, 226 g CHO, no fiber   Free Water Flush: 150 mL every 4 hours

## 2019-01-29 NOTE — PROGRESS NOTES
CV Surgery Progress Note     Assessment & Plan: POD#25 s/p Left groin cutdown with exposure of the femoral vessels with arterial cannulation for cardiopulmonary bypass. Right groin cutdown with exposure of the femoral vessels with the right femoral artery ultimately used for diagnostic angiography. 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 Visceral angiography. Repair of the bilateral femoral vessels by Dr. Jose Winslow.    Main Plans for Today:  1. Midline PIV placement  2. Discharge to [facility] today ~5 pm    CVS:   Stable VS, HR 80/100% VVI paced  Echo on 1/7/29 demonstrated an LVEF 55-60%  On ASA, BB   QUINTEN s/p AVN ablation and PPM placement on 1/11/19. On oral amiodarone. Cont coumadin (goal INR 2-3)  Of note has superficial right arm thrombophlebits and right internal jugular thrombus 2/2 previous central line.  Pulm:  Extubated per protocol and reintubated on POD #8 for resp distress. Extubated POD #14 and reintubated POD #20. Trach POD#21.  CT chest on 1/14 demonstrates RLL compressive atelectasis 2/2 elevated hemidiaphragm (present preoperatively). CXR/CT chest with fluid in fissures.   POD #7 left US guided thoracentesis with removal of 150 mL fluid.   POD #20 right US guided thoracentesis with removal of 400 mL of fluid.  PS trials per critical care  Vent settings: CMV/40%/480/16/5  Neuro:  Grossly intact, follows commands. Intermittently confused and anxious. Mixed delirium, starting to clear. On seroquel BID.  Pain and sedation well managed with current regimen - Precedex off for greater than 24 hours.  Deconditioned - working with PT/OT/speech  Renal:  IMAN -BL Creat ~0.8, 1.31 today, stable  Renal US on 1/6 with patent flow in bilateral renal arteries. Right kidney atrophic.  Renal on board - appreciate. Per neph, goal is to diurese 1-2 pounds per day as tolerated.  Fluid overload with significant  edema.  GI:  Malnourished. Receiving tube feedings via PEG tube placed on POD #21  Five day course of oral prednisone completed for suspected microscopic colitis, flex sig biopsy negative. Loose stools improving.  CT abdomen on  did not demonstrate acute pathology.  Dysphagia 2/2 deconditioning.   and  negative for c. Diff  On PPI  :  Barrientos present - strict I/O, limited mobility (d/c on  and replaced on )  Endo:   Preop A1C 5.3; managed on sliding scale insulin with goal BG <180  FEN:   Replace lytes as needed; Continue tube feedings  Hypernatremia - free water increased to 150 mL every 4 hours  ID:   Temp (24hrs), Av.8  F (37.7  C), Min:99  F (37.2  C), Max:100.2  F (37.9  C)  + UA on , treated with Ceftriaxone  Sputum + candida, on nystatin; BC negative  Heme:   Acute blood loss anemia and thrombocytopenia: Hgb 7.4; Plt 255  Tubes/Drains:  Midline PIV placement today  Barrientos  PEG  Trach  Proph:   BB, ASA, coumadin, PCD, PPI  Dispo:   Continue in ICU with plans to transfer to [facility] within 1-2 days    Subjective: Sitting up in converter chair interacting with family. Communicating that she's anxious to discharge today. Patient and family agreeable to discharge plan.    Objective: B/P: 132/65, T: 99.9, P: 69, R: 16  General: NAD, trach'd on ventilator  Heart: S1, S2, RRR, no m/r/g- 100% VVI paced on bedside monitor  Lungs: CTA on ventilator  Abd: PEG patent, CDI. Abdomen S/NT/ND, + BS  Sternum: Sternal incision CDI  Extremities: 1-2+ generalized and BLE edema with 1+ palp pedal pulses    Lab Results   Component Value Date    WBC 6.6 2019     Lab Results   Component Value Date    RBC 2.50 2019     Lab Results   Component Value Date    HGB 7.4 2019     Lab Results   Component Value Date    HCT 23.6 2019     No components found for: MCT  Lab Results   Component Value Date    MCV 94 2019     Lab Results   Component Value Date    MCH 29.6 2019      Lab Results   Component Value Date    MCHC 31.4 01/29/2019     Lab Results   Component Value Date    RDW 18.3 01/29/2019     Lab Results   Component Value Date     01/29/2019       Last Basic Metabolic Panel:  Lab Results   Component Value Date     01/29/2019      Lab Results   Component Value Date    POTASSIUM 4.0 01/29/2019     Lab Results   Component Value Date    CHLORIDE 112 01/29/2019     Lab Results   Component Value Date    BESS 8.1 01/29/2019     Lab Results   Component Value Date    CO2 28 01/29/2019     Lab Results   Component Value Date    BUN 75 01/29/2019     Lab Results   Component Value Date    CR 1.31 01/29/2019     Lab Results   Component Value Date     01/29/2019       GRZEGORZ García, CCRN-[facility]  Pager: [phone]

## 2019-01-29 NOTE — PROGRESS NOTES
1343: Sign out called to Dr. Pleitez at [place].    GRZEGORZ García, CCRN-CSC  Pager: [phone]

## 2019-01-29 NOTE — PLAN OF CARE
Physical Therapy Discharge Summary    Reason for therapy discharge:    Discharged to [facility]     Progress towards therapy goal(s). See goals on Care Plan in [facility] electronic health record for goal details.  Goals not met.  Barriers to achieving goals:   discharge from facility.    Therapy recommendation(s):    Continued therapy is recommended.  Rationale/Recommendations:  Patient would benefit from continued skilled PT to progress her independence with all functional mobility as well as increase activity tolerance.

## 2019-01-29 NOTE — PROGRESS NOTES
[facility]     Priya Funez has been accepted for admission today.    The accepting unit is 6W and RN report can be called to [phone] prior to transfer.    The accepting MD is Dr. Pleitez and this provider can be reached for MD sign out report by pager at [phone].    Please have the discharge summary and discharge orders completed, faxed to [phone], and available to accompany patient at least one hour before transfer time.    I have updated the CM with admission details.     Thank you for the referral, please feel free to contact me with any questions or concerns.         Thank you,    Gabby Valenzuela  Referral Specialist  [facility]   [email or web address]  [phone] (direct)

## 2019-01-29 NOTE — PLAN OF CARE
Patient alert, makes needs known. PERRL. Follows commands, moves all extremities. BP stable, tele: 100% paced. Lung sounds coarse/wheezy at times. CMV support for trach. Pain controled with tylenol and dilaudid. Tube feed at 50 mL/hr with 100 mL flushes Q4. 2 BM this shift. Heparin discontinued overnight. Potassium replaced per protocol. Plan to transfer to LTAC.

## 2019-01-30 LAB
BACTERIA SPEC CULT: NO GROWTH
BACTERIA SPEC CULT: NO GROWTH
Lab: NORMAL
Lab: NORMAL
SPECIMEN SOURCE: NORMAL
SPECIMEN SOURCE: NORMAL

## 2019-01-30 NOTE — PLAN OF CARE
[id number]  Neuro: pleasant, oriented to place and present events.  VERDIN strongly.  Lungs: No weaning today. Scant creamy secretions  CV: 100% V paced  GI: Nauseated after Nystatin. 80cc residual at that time.I: disc Nystatin. Cont stooling of small amts brown liq stool.   Skin: redness in perineum and between buttocks. Very sore when cleaned.  I: Barrier cream and Miconazole pwdr.  Good diuresis from Lasix and albumin.  Edema cont.  Report called to [facility].  Discharged with [facility].

## 2019-01-31 ENCOUNTER — DOCUMENTATION ONLY (OUTPATIENT)
Dept: CARDIOLOGY | Facility: CLINIC | Age: 79
End: 2019-01-31

## 2019-01-31 NOTE — PROGRESS NOTES
1/31/19: Called placed to check on patient. Unable to reach staff at this time.  GRZEGORZ García, CCRN-CSC  Pager: [phone]

## 2019-02-01 ENCOUNTER — DOCUMENTATION ONLY (OUTPATIENT)
Dept: CARDIOLOGY | Facility: CLINIC | Age: 79
End: 2019-02-01

## 2019-02-01 NOTE — PROGRESS NOTES
Ms. Funez is progressing well at [place]. Her renal function is improving and nephrology is following. She continues to be diuresed.   She is tolerating PS trials and is working well with therapies. Staff is maintaining sternal precautions.  RN states that incisions continue to heal well.   Patient continues to tolerate tube feedings via PEG tube.  INR has been therapeutic and her vital signs have been stable.    GRZEGORZ García, CCRN-[facility]  Pager: [phone]

## 2019-02-06 LAB — INTERPRETATION ECG - MUSE: NORMAL

## 2019-02-21 ENCOUNTER — DOCUMENTATION ONLY (OUTPATIENT)
Dept: CARDIOLOGY | Facility: CLINIC | Age: 79
End: 2019-02-21

## 2019-02-21 NOTE — TELEPHONE ENCOUNTER
Pt currently at [place] [facility]. Gave information to Jan and she will do 6 week s/p PPM implant check (turn downs and change lower rate to 70bpm). She will also set up Carelink monitor.  Rad PAGE

## 2019-02-22 ENCOUNTER — DOCUMENTATION ONLY (OUTPATIENT)
Dept: CARDIOLOGY | Facility: CLINIC | Age: 79
End: 2019-02-22

## 2019-02-22 DIAGNOSIS — Z95.0 CARDIAC PACEMAKER IN SITU: Primary | ICD-10-CM

## 2019-02-28 ENCOUNTER — TRANSFERRED RECORDS (OUTPATIENT)
Dept: HEALTH INFORMATION MANAGEMENT | Facility: CLINIC | Age: 79
End: 2019-02-28

## 2019-03-04 ENCOUNTER — PRE VISIT (OUTPATIENT)
Dept: CARDIOLOGY | Facility: CLINIC | Age: 79
End: 2019-03-04

## 2019-03-04 DIAGNOSIS — I71.010 ASCENDING AORTIC DISSECTION (H): ICD-10-CM

## 2019-03-04 DIAGNOSIS — J90 PLEURAL EFFUSION, BILATERAL: ICD-10-CM

## 2019-03-04 DIAGNOSIS — I48.19 PERSISTENT ATRIAL FIBRILLATION (H): ICD-10-CM

## 2019-03-06 ENCOUNTER — TRANSFERRED RECORDS (OUTPATIENT)
Dept: HEALTH INFORMATION MANAGEMENT | Facility: CLINIC | Age: 79
End: 2019-03-06

## 2019-03-07 ENCOUNTER — MEDICAL CORRESPONDENCE (OUTPATIENT)
Dept: HEALTH INFORMATION MANAGEMENT | Facility: CLINIC | Age: 79
End: 2019-03-07

## 2019-03-25 NOTE — TELEPHONE ENCOUNTER
FUTURE VISIT INFORMATION      FUTURE VISIT INFORMATION:    Date: 3/26/19    Time: 11:30AM    [place] [facility]  REFERRAL INFORMATION:    Referring provider:  Matt Kennedy NP    Referring providers clinic:  [facility]     Reason for visit/diagnosis  subglottic airway obstruction     RECORDS REQUESTED FROM:       Clinic name Comments Records Status Imaging Status   [facility]  01/29/2019-3/25/19 notes   Care Everyhwere    [facility] 3/20/19, 3/8/19, 2/18/19 XR Swallow Study   3/9/19, 2/24/19, 2/13/19, 2/5/19 XR Chest   3/6/19 CT Neck   2/28/19 Bronchoscopy    Report: care everywhere PACS   [facility]  1/4/19-1/29/19 notes     1/15/19  COMBINED SIGMOIDOSCOPY FLEXIBLE, BIOPSY    1/25/19 TRACHEOSTOMY     EPIC                        3/25/19 12:36PM Jan2019 images in PACS, sent a request to [place] for [facility] 2019 images

## 2019-03-26 ENCOUNTER — OFFICE VISIT (OUTPATIENT)
Dept: OTOLARYNGOLOGY | Facility: CLINIC | Age: 79
End: 2019-03-26
Payer: COMMERCIAL

## 2019-03-26 ENCOUNTER — PRE VISIT (OUTPATIENT)
Dept: OTOLARYNGOLOGY | Facility: CLINIC | Age: 79
End: 2019-03-26

## 2019-03-26 VITALS — RESPIRATION RATE: 16 BRPM | HEIGHT: 64 IN | BODY MASS INDEX: 37.56 KG/M2 | WEIGHT: 220 LBS

## 2019-03-26 DIAGNOSIS — J95.03 TRACHEAL STENOSIS FOLLOWING TRACHEOSTOMY (H): Primary | ICD-10-CM

## 2019-03-26 DIAGNOSIS — Z93.0 TRACHEOSTOMY STATUS (H): ICD-10-CM

## 2019-03-26 ASSESSMENT — MIFFLIN-ST. JEOR: SCORE: 1472.91

## 2019-03-26 NOTE — LETTER
3/26/2019       RE: Priya Funez  [address]  [place] [address]     Dear Colleague,    Thank you for referring your patient, Priya Funez, to the [facility] THROAT at [facility]. Please see a copy of my visit note below.      HISTORY OF PRESENT ILLNESS: Priya Funez is a 78 year old female with a history of an ascending aortic dissection and shortness of breath.  She went to the hospital on 1/4/2019.  She had a replacement of the ascending aorta with a 34 mm graft and aortic valve resuspension on January 4, 2019.  She also had AV node ablation and permanent pacemaker placed on 1/11/2019.  She developed acute respiratory failure on 1/13/2019 despite attempts at optimization on BiPAP and high flow oxygen.  She was reintubated.  She was extubated again on 1/18/2019.  She was managed on high flow nasal cannula and BiPAP.  She had a ultrasound-guided thoracentesis performed on 1/24/2019.  She developed worsening respiratory failure was reintubated.  She had a tracheostomy with a Shiley #6 cuffed nonfenestrated tube on 1/25/2019.  During the tracheostomy she had a vertical incision between the tracheal rings.  She recovered and was discharged on 1/29/2019.  She was transferred to [place] for rehab.  At that time she had a 5.0 Portex TTS tracheostomy tube.  She was just transferred from [place] to a transitional care facility who is still comfortable taking care of her tracheostomy tube.  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  She is currently able to eat most foods and her diet has recently be been liberalized to involving water without thickening.  She wears a Passy-[place] valve.  She feels that her breathing is relatively comfortable with the tube in place.    Last 2 Scores for Patient-Answered VHI Questionnaire  VHI Total Score 3/26/2019   VHI Total Score 1       Last 2 Scores for Patient-Answered CSI Questionnaire  CSI Total Score  3/26/2019   CSI Total Score 2         Last 2 Scores for Patient-Answered EAT Questionnaire  EAT Total Score 3/26/2019   EAT Total Score 18           PAST MEDICAL HISTORY:   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, s/p Replacement of the ascending aorta with a 34 mm Hemashield branch graft 1/4/19     Hypertension      Persistent atrial fibrillation (H)     post-op 1/2019: AV node ablation and single chamber pacemaker implanted 1/11/2019     Pleural effusion, bilateral     s/p left thoracentesis of 150cc 1/11/19;  right thoracentesis of 400cc 1/24/2019       PAST SURGICAL HISTORY:   Past Surgical History:   Procedure Laterality Date     ANGIOGRAM Right 1/4/2019    Procedure: DIAGONSTIC ANGIOGRAM OF SMA;  Surgeon: Rigo Jennings MD;  Location:  OR     BYPASS GRAFT ARTERY CORONARY, REPAIR VALVE AORTIC, COMBINED N/A 1/4/2019    Procedure: AORTIC DISSECTION REPAIR WITH GRAFT- HEMASHIELD PLATINUM WOVEN DOUBLE VELOR 4 SIDE ARM VASCULAR GRAFT, D:35 X 12 X 10 X 10MM/ L:50CM;  Surgeon: Jose Winslow MD;  Location:  OR     EP ABLATION AV NODE N/A 1/11/2019    Procedure: EP Ablation AV Node;  Surgeon: Tsering Davey MD;  Location:  HEART CARDIAC CATH LAB     EP PACEMAKER Left 1/11/2019    Procedure: EP Pacemaker;  Surgeon: Tsering Davey MD;  Location:  HEART CARDIAC CATH LAB     TRACHEOSTOMY N/A 1/25/2019    Procedure: TRACHEOSTOMY  (DR MCCLELLAND);  Surgeon: Bryson Mcclelland MD;  Location:  OR       FAMILY HISTORY: No family history on file.    SOCIAL HISTORY:   Social History     Tobacco Use     Smoking status: Never Smoker     Smokeless tobacco: Never Used     Tobacco comment: hisband   Substance Use Topics     Alcohol use: No     Frequency: Never       REVIEW OF SYSTEMS: Ten point review of systems was performed and is negative except for:   [facility] ENT ROS 3/26/2019   Ears, Nose, Throat Trouble swallowing   Cardiopulmonary Cough, Breathing  problems   Endocrine Frequent urination        ALLERGIES: Amoxicillin and Ciprofloxacin    MEDICATIONS:   Current Outpatient Medications   Medication Sig Dispense Refill     aspirin (ASA) 81 MG EC tablet Take 81 mg by mouth every 24 hours       cholecalciferol (VITAMIN D-1000 MAX ST) 1000 units TABS Take 1,000 Units by mouth daily        albuterol (PROVENTIL) (2.5 MG/3ML) 0.083% neb solution Take 1 vial (2.5 mg) by nebulization every 4 hours as needed for wheezing       insulin aspart (NOVOLOG PEN) 100 UNIT/ML pen Inject 1-6 Units Subcutaneous every 4 hours 10.8 mL 0     ipratropium - albuterol 0.5 mg/2.5 mg/3 mL (DUONEB) 0.5-2.5 (3) MG/3ML neb solution Take 1 vial (3 mLs) by nebulization every 4 hours as needed for wheezing       metoprolol tartrate (LOPRESSOR) 25 MG tablet 1 tablet (25 mg) by Per Feeding Tube route 2 times daily 60 tablet 0     QUEtiapine (SEROQUEL) 25 MG tablet 1 tablet (25 mg) by Oral or NG Tube route every 6 hours as needed (agitation, delirium)       QUEtiapine (SEROQUEL) 25 MG tablet Take 1 tablet (25 mg) by mouth 2 times daily 60 tablet 0     warfarin (COUMADIN) 2 MG tablet Take 1 tablet (2 mg) by mouth daily       Warfarin Therapy Reminder 1 each continuous prn           PHYSICAL EXAMINATION:  She  is awake, alert and in no apparent distress.    Her tympanic membranes are clear and intact bilaterally. External auditory canals are clear.  Nasal exam shows a mild septal deviation without obstruction.  Examination of the oral cavity shows no suspicious lesions.  There is symmetric movement of the tongue and soft palate.    The oropharynx is clear.  Her neck is supple without significant adenopathy.  There is a Portex size 5.0 tight to shaft tracheostomy tube in place.  The stoma appears healthy.  Pulse is regular.  Upper airway is clear.  Her tracheostomy tube was plugged and she was able to breathe comfortably without stridor.  Cranial nerves II-XII are grossly intact.       PROCEDURE: A  flexible laryngoscopy  was performed.  Informed consent was obtained and a time out was performed. 3% lidocaine and 0.25% phenylephrine was sprayed into the nasal cavity and allowed 3 to 5 minutes for effect. The scope was passed through the right sided nostril. Examination showed the vocal folds to be mobile and meet in the midline.  No nodules, polyps or ulcerations are seen.  There is mild inflammation or erythema of the supraglottic or glottic larynx.  With phonation there is mild contraction of the supraglottic larynx.  Examination of the subglottic area showed some mild narrowing of the trachea but the tube could be seen coming into the trachea without a suprastomal flap.  The hypopharynx is otherwise clear.     Subglottis    Distal trachea                 Trachea at tracheostomy site      The scope was then passed through the tracheostomy tube which was clear.  The distal trachea was quite healthy without evidence of erosion or granulation tissue.  The tracheostomy tube was then removed.  From the stoma down to the keegan the trachea appeared quite healthy.  There did not appear to be excessive narrowing at the site of the tracheostomy tube.  Looking superiorly was difficult due to coughing and a clear view was unable to be obtained.    IMPRESSION/PLAN: Narrowing of trachea but no obvious, significant tracheal stenosis or subglottic stenosis is seen on today's examination.  She does ventilate well while at rest with the tracheostomy tube plugged.  I am recommending that they try to plug the tube on an intermittent basis over the next few weeks.  If this is tolerated well they could begin a decannulation protocol if no additional surgeries or other interventions are planned.  I would recommend that she stay plugged for at least 1 month around the clock before consideration of decannulation.  In addition she should be observed the first few times that she is allowed to remain plugged while asleep.  She is also  taught today how to remove the Should she need to.  I will see her back in 2 months time      Again, thank you for allowing me to participate in the care of your patient.      Sincerely,    Valentin Leger MD

## 2019-03-26 NOTE — PROGRESS NOTES
HISTORY OF PRESENT ILLNESS: Priya Funez is a 78 year old female with a history of an ascending aortic dissection and shortness of breath.  She went to the hospital on 1/4/2019.  She had a replacement of the ascending aorta with a 34 mm graft and aortic valve resuspension on January 4, 2019.  She also had AV node ablation and permanent pacemaker placed on 1/11/2019.  She developed acute respiratory failure on 1/13/2019 despite attempts at optimization on BiPAP and high flow oxygen.  She was reintubated.  She was extubated again on 1/18/2019.  She was managed on high flow nasal cannula and BiPAP.  She had a ultrasound-guided thoracentesis performed on 1/24/2019.  She developed worsening respiratory failure was reintubated.  She had a tracheostomy with a Shiley #6 cuffed nonfenestrated tube on 1/25/2019.  During the tracheostomy she had a vertical incision between the tracheal rings.  She recovered and was discharged on 1/29/2019.  She was transferred to [place] for rehab.  At that time she had a 5.0 Portex TTS tracheostomy tube.  She was just transferred from [place] to a transitional care facility who is still comfortable taking care of her tracheostomy tube.  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  She is currently able to eat most foods and her diet has recently be been liberalized to involving water without thickening.  She wears a [place] valve.  She feels that her breathing is relatively comfortable with the tube in place.    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, s/p Replacement of the  ascending aorta with a 34 mm Hemashield branch graft 1/4/19     Hypertension      Persistent atrial fibrillation (H)     post-op 1/2019: AV node ablation and single chamber pacemaker implanted 1/11/2019     Pleural effusion, bilateral     s/p left thoracentesis of 150cc 1/11/19;  right thoracentesis of 400cc 1/24/2019       PAST SURGICAL HISTORY:   Past Surgical History:   Procedure Laterality Date     ANGIOGRAM Right 1/4/2019    Procedure: DIAGONSTIC ANGIOGRAM OF SMA;  Surgeon: Rigo Jennings MD;  Location:  OR     BYPASS GRAFT ARTERY CORONARY, REPAIR VALVE AORTIC, COMBINED N/A 1/4/2019    Procedure: AORTIC DISSECTION REPAIR WITH GRAFT- HEMASHIELD PLATINUM WOVEN DOUBLE VELOR 4 SIDE ARM VASCULAR GRAFT, D:35 X 12 X 10 X 10MM/ L:50CM;  Surgeon: Jose Winslow MD;  Location:  OR     EP ABLATION AV NODE N/A 1/11/2019    Procedure: EP Ablation AV Node;  Surgeon: Tsering Davey MD;  Location:  HEART CARDIAC CATH LAB     EP PACEMAKER Left 1/11/2019    Procedure: EP Pacemaker;  Surgeon: Tsering Davey MD;  Location:  HEART CARDIAC CATH LAB     TRACHEOSTOMY N/A 1/25/2019    Procedure: TRACHEOSTOMY  (DR MCCLELLAND);  Surgeon: Bryson Mcclelland MD;  Location:  OR       FAMILY HISTORY: No family history on file.    SOCIAL HISTORY:   Social History     Tobacco Use     Smoking status: Never Smoker     Smokeless tobacco: Never Used     Tobacco comment: hisband   Substance Use Topics     Alcohol use: No     Frequency: Never       REVIEW OF SYSTEMS: Ten point review of systems was performed and is negative except for:   [facility] ROS 3/26/2019   Ears, Nose, Throat Trouble swallowing   Cardiopulmonary Cough, Breathing problems   Endocrine Frequent urination        ALLERGIES: Amoxicillin and Ciprofloxacin    MEDICATIONS:   Current Outpatient Medications   Medication Sig Dispense Refill     aspirin (ASA) 81 MG EC tablet Take 81 mg by mouth every 24 hours       cholecalciferol (VITAMIN D-1000 MAX ST) 1000 units  TABS Take 1,000 Units by mouth daily        albuterol (PROVENTIL) (2.5 MG/3ML) 0.083% neb solution Take 1 vial (2.5 mg) by nebulization every 4 hours as needed for wheezing       insulin aspart (NOVOLOG PEN) 100 UNIT/ML pen Inject 1-6 Units Subcutaneous every 4 hours 10.8 mL 0     ipratropium - albuterol 0.5 mg/2.5 mg/3 mL (DUONEB) 0.5-2.5 (3) MG/3ML neb solution Take 1 vial (3 mLs) by nebulization every 4 hours as needed for wheezing       metoprolol tartrate (LOPRESSOR) 25 MG tablet 1 tablet (25 mg) by Per Feeding Tube route 2 times daily 60 tablet 0     QUEtiapine (SEROQUEL) 25 MG tablet 1 tablet (25 mg) by Oral or NG Tube route every 6 hours as needed (agitation, delirium)       QUEtiapine (SEROQUEL) 25 MG tablet Take 1 tablet (25 mg) by mouth 2 times daily 60 tablet 0     warfarin (COUMADIN) 2 MG tablet Take 1 tablet (2 mg) by mouth daily       Warfarin Therapy Reminder 1 each continuous prn           PHYSICAL EXAMINATION:  She  is awake, alert and in no apparent distress.    Her tympanic membranes are clear and intact bilaterally. External auditory canals are clear.  Nasal exam shows a mild septal deviation without obstruction.  Examination of the oral cavity shows no suspicious lesions.  There is symmetric movement of the tongue and soft palate.    The oropharynx is clear.  Her neck is supple without significant adenopathy.  There is a Portex size 5.0 tight to shaft tracheostomy tube in place.  The stoma appears healthy.  Pulse is regular.  Upper airway is clear.  Her tracheostomy tube was plugged and she was able to breathe comfortably without stridor.  Cranial nerves II-XII are grossly intact.       PROCEDURE: A flexible laryngoscopy  was performed.  Informed consent was obtained and a time out was performed. 3% lidocaine and 0.25% phenylephrine was sprayed into the nasal cavity and allowed 3 to 5 minutes for effect. The scope was passed through the right sided nostril. Examination showed the vocal folds to  be mobile and meet in the midline.  No nodules, polyps or ulcerations are seen.  There is mild inflammation or erythema of the supraglottic or glottic larynx.  With phonation there is mild contraction of the supraglottic larynx.  Examination of the subglottic area showed some mild narrowing of the trachea but the tube could be seen coming into the trachea without a suprastomal flap.  The hypopharynx is otherwise clear.     Subglottis    Distal trachea                 Trachea at tracheostomy site      The scope was then passed through the tracheostomy tube which was clear.  The distal trachea was quite healthy without evidence of erosion or granulation tissue.  The tracheostomy tube was then removed.  From the stoma down to the keegan the trachea appeared quite healthy.  There did not appear to be excessive narrowing at the site of the tracheostomy tube.  Looking superiorly was difficult due to coughing and a clear view was unable to be obtained.    IMPRESSION/PLAN: Narrowing of trachea but no obvious, significant tracheal stenosis or subglottic stenosis is seen on today's examination.  She does ventilate well while at rest with the tracheostomy tube plugged.  I am recommending that they try to plug the tube on an intermittent basis over the next few weeks.  If this is tolerated well they could begin a decannulation protocol if no additional surgeries or other interventions are planned.  I would recommend that she stay plugged for at least 1 month around the clock before consideration of decannulation.  In addition she should be observed the first few times that she is allowed to remain plugged while asleep.  She is also taught today how to remove the Should she need to.  I will see her back in 2 months time

## 2019-03-29 ENCOUNTER — HOSPITAL ENCOUNTER (EMERGENCY)
Facility: CLINIC | Age: 79
Discharge: NURSING, REHAB OR LONG-TERM CARE | End: 2019-03-29
Attending: INTERNAL MEDICINE | Admitting: INTERNAL MEDICINE
Payer: COMMERCIAL

## 2019-03-29 ENCOUNTER — DOCUMENTATION ONLY (OUTPATIENT)
Dept: OTOLARYNGOLOGY | Facility: CLINIC | Age: 79
End: 2019-03-29

## 2019-03-29 VITALS
HEIGHT: 63 IN | DIASTOLIC BLOOD PRESSURE: 75 MMHG | TEMPERATURE: 97.9 F | RESPIRATION RATE: 14 BRPM | HEART RATE: 69 BPM | SYSTOLIC BLOOD PRESSURE: 133 MMHG | BODY MASS INDEX: 31.89 KG/M2 | OXYGEN SATURATION: 100 % | WEIGHT: 180 LBS

## 2019-03-29 DIAGNOSIS — Z43.0 ACUTE TRACHEOSTOMY MANAGEMENT (H): ICD-10-CM

## 2019-03-29 PROCEDURE — 99285 EMERGENCY DEPT VISIT HI MDM: CPT | Mod: 25

## 2019-03-29 RX ORDER — LIDOCAINE HYDROCHLORIDE 20 MG/ML
INJECTION, SOLUTION EPIDURAL; INFILTRATION; INTRACAUDAL; PERINEURAL
Status: DISCONTINUED
Start: 2019-03-29 | End: 2019-03-29 | Stop reason: HOSPADM

## 2019-03-29 ASSESSMENT — ENCOUNTER SYMPTOMS
SPEECH DIFFICULTY: 0
SHORTNESS OF BREATH: 1
DIFFICULTY URINATING: 0
COLOR CHANGE: 0
VOICE CHANGE: 0
EYE REDNESS: 0
ARTHRALGIAS: 0
TROUBLE SWALLOWING: 0
FEVER: 0
ABDOMINAL PAIN: 0
HEADACHES: 0
CONFUSION: 0
NECK STIFFNESS: 0

## 2019-03-29 ASSESSMENT — MIFFLIN-ST. JEOR: SCORE: 1275.6

## 2019-03-29 NOTE — ED TRIAGE NOTES
Pt had trach fall out at SNF. Maintaining airway without issues, sats WNL. Breathing non labored. Talking without pain or other issues.

## 2019-03-29 NOTE — DISCHARGE INSTRUCTIONS
Please make an appointment to follow up with Your Primary Care Provider and [facility] (phone: [phone]) as soon as possible even if entirely better.

## 2019-03-29 NOTE — CONSULTS
"Otolaryngology/ENT History & Physical  March 29, 2019    CC: \"accidental tracheostomy decannulation\"    HPI: Priya Funez is a 78-year-old woman with history of ascending aortic dissection and persistent atrial fibrillation. She had a recent hospitalization starting on 1/4/2019 for aortic dissection, now s/p grafting, AV node ablation, and permanent pacemaker placement on 1/11/2019. She developed acute respiratory failure on 1/13/2019 requiring intubation, and a tracheostomy was placed on 1/25/2019 with a 6-0 cuffed Shiley. She was subsequently discharged on 1/29/19 to [place] for rehabilitation with a 5-0 Portex TTS in place. Recently she left [facility] for a TCU. Dr. Leger in ENT clinic evaluated her three days ago on 3/26/2019 and recommended that she plug the tube on an intermittent basis over the next month and practice using her PMSV. She was recommended to be decannulated if she could remain capped continuously for one month. She presents to the ED today after it was discovered she had accidentally decannulated while at her care facility. The timeline for the decannulation is not entirely clear; it was found out at approximately 12:30pm today and the last time she was seen by family with it in was around 2pm the previous day. The patient does not remember it coming out. She has been eating and drinking normally without distress or choking episodes. Upon arrival to the ED she was in no distress on room air, and able to talk in full sentences easily without shortness of breath. Her voice is strong and not breathy at all. There is no audible air from the tracheostomy site during phonation or cough.    PMH:  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, s/p Replacement of the ascending aorta with a 34 mm Hemashield branch graft 1/4/19     Hypertension      Persistent atrial fibrillation (H)     post-op 1/2019: AV node ablation and single chamber pacemaker " implanted 1/11/2019     Pleural effusion, bilateral     s/p left thoracentesis of 150cc 1/11/19;  right thoracentesis of 400cc 1/24/2019     PSH:  Past Surgical History:   Procedure Laterality Date     ANGIOGRAM Right 1/4/2019    Procedure: DIAGONSTIC ANGIOGRAM OF SMA;  Surgeon: Rigo Jennnigs MD;  Location:  OR     BYPASS GRAFT ARTERY CORONARY, REPAIR VALVE AORTIC, COMBINED N/A 1/4/2019    Procedure: AORTIC DISSECTION REPAIR WITH GRAFT- HEMASHIELD PLATINUM WOVEN DOUBLE VELOR 4 SIDE ARM VASCULAR GRAFT, D:35 X 12 X 10 X 10MM/ L:50CM;  Surgeon: Jose Winslow MD;  Location:  OR     EP ABLATION AV NODE N/A 1/11/2019    Procedure: EP Ablation AV Node;  Surgeon: Tsering Davey MD;  Location:  HEART CARDIAC CATH LAB     EP PACEMAKER Left 1/11/2019    Procedure: EP Pacemaker;  Surgeon: Tsering Davey MD;  Location:  HEART [facility]ARDIAC CATH LAB     TRACHEOSTOMY N/A 1/25/2019    Procedure: TRACHEOSTOMY  (DR MCCLELLAND);  Surgeon: Bryson Mcclelland MD;  Location:  OR     Med:  Current Outpatient Medications   Medication Sig Dispense Refill     aspirin (ASA) 81 MG EC tablet Take 81 mg by mouth every 24 hours       cholecalciferol (VITAMIN D-1000 MAX ST) 1000 units TABS Take 1,000 Units by mouth daily        albuterol (PROVENTIL) (2.5 MG/3ML) 0.083% neb solution Take 1 vial (2.5 mg) by nebulization every 4 hours as needed for wheezing       insulin aspart (NOVOLOG PEN) 100 UNIT/ML pen Inject 1-6 Units Subcutaneous every 4 hours 10.8 mL 0     ipratropium - albuterol 0.5 mg/2.5 mg/3 mL (DUONEB) 0.5-2.5 (3) MG/3ML neb solution Take 1 vial (3 mLs) by nebulization every 4 hours as needed for wheezing       metoprolol tartrate (LOPRESSOR) 25 MG tablet 1 tablet (25 mg) by Per Feeding Tube route 2 times daily 60 tablet 0     QUEtiapine (SEROQUEL) 25 MG tablet 1 tablet (25 mg) by Oral or NG Tube route every 6 hours as needed (agitation, delirium)       QUEtiapine (SEROQUEL) 25 MG tablet Take 1 tablet (25 mg) by mouth  "2 times daily 60 tablet 0     warfarin (COUMADIN) 2 MG tablet Take 1 tablet (2 mg) by mouth daily       Warfarin Therapy Reminder 1 each continuous prn       All:  Allergies   Allergen Reactions     Amoxicillin Swelling     Ciprofloxacin Swelling     FH: Non-contributory.    SH: Non-smoker.    ROS: 12 point review of systems is negative unless noted in HPI.    Physical Exam:  General: Resting comfortably in bed, in no acute distress.  /90   Pulse 92   Temp 97.9  F (36.6  C) (Oral)   Resp 18   Ht 1.6 m (5' 3\")   Wt 81.6 kg (180 lb)   SpO2 96%   BMI 31.89 kg/m    Head: Normocephalic, atraumatic.  Face: Symmetric, no swelling, edema, or erythema, no facial droop.  Eyes: EOMI without spontaneous or gaze evoked nystagmus, PERRL, clear sclera.  Nose: Nares patent, no anterior drainage, intact and midline septum.  Mouth: Moist mucous membranes, no ulcers or oral lesions, tongue midline and symmetric.  Oropharynx: Uvula midline, no oropharyngeal erythema.  Neck: No palpable cervical LAD, trachea midline. Tracheal stoma appeared stenotic and occluded with granulation tissue. A cotton tip applicator was initially used to probe the stoma and was unable to be passed into the trachea.  Neuro: Cranial nerves 2-12 grossly intact.  Respiratory: Breathing non-labored on RA, no stridor, no stertor, no accessory muscle use.     Tracheostomy Tube Placement Procedure Note: The patient was appropriately positioned flat and supine. The residual tracheal stoma was explored and dilated with a cotton tip applicator. Progressive dilations were made with a 4-0 Eros obturator and then a 4-0 Eros tracheostomy tube with moderate resistance. The patient's original 5-0 Portex trach was then inserted with obturator with mild resistance. The obturator was removed and inner cannula locked in place. Correct positioning of trach was confirmed with fiberoptic tracheoscopy. Trach ties were placed and secured. The patient tolerated the " tracheal stoma dilation and trach tube placement well and without complication.     Fiberoptic Endoscopy: Fiberoptic tracheoscopy was indicated to ensure proper tracheostomy tube placement. Verbal consent was obtained from the patient. The flexible laryngoscope was placed through the tracheal tube. The tracheal mucosa appeared healthy. There was no sloughing of the mucosa, purulence, or hemorrhage. The keegan was easily visible.    Assessment and Plan: Priya Funez is a 78-year-old woman with history of ascending aortic dissection and persistent atrial fibrillation who presents after accidental decannulation. She was not in acute airway distress despite this. Her tracheal stoma was dilated and her 5-0 Portex replaced and secured with tracheostomy ties.    - Patient's 5-0 Portex tracheostomy replaced at bedside without complication  - Continue regular tracheostomy cares at TCU  - Continue capping trials, beginning with supervised capping for 1-2 hours and slowly transitioning to capping 24/7  - Patient's TCU requesting specific capping trial protocol, will send message to ENT clinic to facilitate this  - Follow-up with Dr. Leger outpatient as scheduled    Patient was discussed with staff, Dr. Gonzalez, who agrees with the assessment and plan as described.    Anna Mcclure MD PGY-1  Otolaryngology-Head & Neck Surgery  Please page ENT with questions by dialing [phone] and entering job code [id number].    I, Niki Gonzalez, discussed this patient with the resident/fellow at the time of consultation. I have reviewed the note,  and am in agreement with the assessment and plan. I did not see the patient.  Niki Gonzalez MD

## 2019-03-29 NOTE — PROGRESS NOTES
RN will ask Dr. Leger for guidance on capping trach. Once we have a set plan, RN will fax this to the patient's care facility.

## 2019-03-29 NOTE — ED AVS SNAPSHOT
[facility], [place], Emergency Department  [address] [place] [id number]  Phone:  [phone]                                    Priya Funez   MRN: [id number]    Department:  [facility], Emergency Department   Date of Visit:  3/29/2019           After Visit Summary Signature Page    I have received my discharge instructions, and my questions have been answered. I have discussed any challenges I see with this plan with the nurse or doctor.    ..........................................................................................................................................  Patient/Patient Representative Signature      ..........................................................................................................................................  Patient Representative Print Name and Relationship to Patient    ..................................................               ................................................  Date                                   Time    ..........................................................................................................................................  Reviewed by Signature/Title    ...................................................              ..............................................  Date                                               Time          22EPIC Rev 08/18

## 2019-03-29 NOTE — ED PROVIDER NOTES
[place] EMERGENCY DEPARTMENT ([facility])  3/29/19   History     Chief Complaint   Patient presents with     trach problem     The history is provided by the patient and medical records.     Priya Funez is a 78 year old female who presents to the Emergency Department for evaluation after her trach tube came out.  The patient's trach was placed on 1/25/2019 at [facility].  The patient was discharged from [facility] to [facility] and was at [facility] until 3/25/2019.  The patient's trach was evaluated the next day on 3/26/2019 in clinic.  The patient believes that her trach came out of place on 3/27/2019 and they did not notice it until today.  Patient notes that last night she was having some mild shortness of breath.  She reports that her eating and drinking has been normal as well as her talking.    I have reviewed the Medications, Allergies, Past Medical and Surgical History, and Social History in the [facility] system.    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, s/p Replacement of the ascending aorta with a 34 mm Hemashield branch graft 1/4/19     Hypertension      Persistent atrial fibrillation (H)     post-op 1/2019: AV node ablation and single chamber pacemaker implanted 1/11/2019     Pleural effusion, bilateral     s/p left thoracentesis of 150cc 1/11/19;  right thoracentesis of 400cc 1/24/2019       Past Surgical History:   Procedure Laterality Date     ANGIOGRAM Right 1/4/2019    Procedure: DIAGONSTIC ANGIOGRAM OF SMA;  Surgeon: Rigo Jennings MD;  Location: [facility] OR     BYPASS GRAFT ARTERY CORONARY, REPAIR VALVE AORTIC, COMBINED N/A 1/4/2019    Procedure: AORTIC DISSECTION REPAIR WITH GRAFT- HEMASHIELD PLATINUM WOVEN DOUBLE VELOR 4 SIDE ARM VASCULAR GRAFT, D:35 X 12 X 10 X 10MM/ L:50CM;  Surgeon: Jose Winslow MD;  Location: [facility] OR     EP ABLATION AV NODE N/A 1/11/2019    Procedure: EP Ablation AV Node;  Surgeon: Tamica  "MD Tsering;  Location:  HEART CARDIAC CATH LAB     EP PACEMAKER Left 1/11/2019    Procedure: EP Pacemaker;  Surgeon: Tsering Davey MD;  Location:  HEART CARDIAC CATH LAB     TRACHEOSTOMY N/A 1/25/2019    Procedure: TRACHEOSTOMY  (DR MCCLELLAND);  Surgeon: Bryson Mcclelland MD;  Location:  OR       No family history on file.    Social History     Tobacco Use     Smoking status: Never Smoker     Smokeless tobacco: Never Used     Tobacco comment: hisband   Substance Use Topics     Alcohol use: No     Frequency: Never       Current Facility-Administered Medications   Medication     lidocaine (PF) (XYLOCAINE) 2 % injection     Current Outpatient Medications   Medication     aspirin (ASA) 81 MG EC tablet     cholecalciferol (VITAMIN D-1000 MAX ST) 1000 units TABS     albuterol (PROVENTIL) (2.5 MG/3ML) 0.083% neb solution     insulin aspart (NOVOLOG PEN) 100 UNIT/ML pen     ipratropium - albuterol 0.5 mg/2.5 mg/3 mL (DUONEB) 0.5-2.5 (3) MG/3ML neb solution     metoprolol tartrate (LOPRESSOR) 25 MG tablet     QUEtiapine (SEROQUEL) 25 MG tablet     QUEtiapine (SEROQUEL) 25 MG tablet     warfarin (COUMADIN) 2 MG tablet     Warfarin Therapy Reminder        Allergies   Allergen Reactions     Amoxicillin Swelling     Ciprofloxacin Swelling         Review of Systems   Constitutional: Negative for fever.   HENT: Negative for congestion, trouble swallowing and voice change.    Eyes: Negative for redness.   Respiratory: Positive for shortness of breath (mild).    Cardiovascular: Negative for chest pain.   Gastrointestinal: Negative for abdominal pain.   Genitourinary: Negative for difficulty urinating.   Musculoskeletal: Negative for arthralgias and neck stiffness.   Skin: Negative for color change.   Neurological: Negative for speech difficulty and headaches.   Psychiatric/Behavioral: Negative for confusion.       Physical Exam   BP: 141/90  Pulse: 92  Temp: 97.9  F (36.6  C)  Resp: 18  Height: 160 cm (5' 3\")  Weight: 81.6 " kg (180 lb)  SpO2: 96 %      Physical Exam   Constitutional: No distress.   HENT:   Head: Atraumatic.   Mouth/Throat: Oropharynx is clear and moist. No oropharyngeal exudate.   Eyes: Pupils are equal, round, and reactive to light. No scleral icterus.   Neck:   Tracheostomy site closing   Cardiovascular: Normal rate, regular rhythm, normal heart sounds and intact distal pulses. Exam reveals no gallop and no friction rub.   No murmur heard.  Pulmonary/Chest: Effort normal and breath sounds normal. No stridor. No respiratory distress. She has no wheezes. She has no rales. She exhibits no tenderness.   Abdominal: Soft. Bowel sounds are normal. There is no tenderness.   Musculoskeletal: She exhibits no edema or tenderness.   Skin: Skin is warm. No rash noted. She is not diaphoretic.       ED Course   1:44 PM  The patient was seen and examined by Anjana Gonzalez MD in Room ED15.        Procedures               Labs Ordered and Resulted from Time of ED Arrival Up to the Time of Departure from the ED - No data to display    Consults  ENT: Responded (03/29/19 0939)    Assessments & Plan (with Medical Decision Making)  Tracheostomy out-the site closing, ENT consult done and scope then replaced Trach, will discharge and follow up with her PMD and ENT.       I have reviewed the nursing notes.    I have reviewed the findings, diagnosis, plan and need for follow up with the patient.       Medication List      ASK your doctor about these medications    acetaminophen 325 MG tablet  Commonly known as:  TYLENOL  650 mg, Oral, EVERY 4 HOURS PRN  Ask about: Should I take this medication?     dextrose 50 % injection  25-50 mLs, Intravenous, EVERY 15 MIN PRN  Ask about: Should I take this medication?     ferrous sulfate 300 (60 Fe) MG/5ML syrup  220 mg, Oral, DAILY  Ask about: Should I take this medication?     glucagon 1 MG Solr injection  1 mg, Subcutaneous, EVERY 15 MIN PRN  Ask about: Should I take this medication?     glucose 40 %  (400 mg/mL) Gel gel  15-30 g, Oral, EVERY 15 MIN PRN  Ask about: Should I take this medication?     hydrogen peroxide 3 % solution  Topical, EVERY 1 HOUR PRN  Ask about: Should I take this medication?     melatonin 5 MG sublingual tablet  5 mg, Sublingual, AT BEDTIME  Ask about: Should I take this medication?     miconazole 2 % external powder  Commonly known as:  MICATIN/MICRO GUARD  Topical, 2 TIMES DAILY  Ask about: Should I take this medication?     oxyCODONE 5 MG tablet  Commonly known as:  ROXICODONE  5-10 mg, Oral, EVERY 4 HOURS PRN  Ask about: Should I take this medication?     pantoprazole 2 mg/mL Susp suspension  Commonly known as:  PROTONIX  40 mg, Oral, EVERY MORNING BEFORE BREAKFAST  Ask about: Should I take this medication?            Final diagnoses:   Acute tracheostomy management (H)     Howard MEJÍA, am serving as a trained medical scribe to document services personally performed by Anjana Gonzalez MD, based on the provider's statements to me.   Anjana MEJÍA MD, was physically present and have reviewed and verified the accuracy of this note documented by Howard Mejia.    3/29/2019   [facility], [place], EMERGENCY DEPARTMENT     Anjana Gonzalez MD  03/29/19 8626

## 2019-03-29 NOTE — ED NOTES
[facility] ([phone]) called with nursing report.  Nursing staff given information regarding new trach tube and number for ENT staff provided. Transport arranged through [facility].

## 2019-04-01 ENCOUNTER — TELEPHONE (OUTPATIENT)
Dept: PULMONOLOGY | Facility: CLINIC | Age: 79
End: 2019-04-01

## 2019-04-01 NOTE — TELEPHONE ENCOUNTER
" [facility] Call Center    Phone Message    May a detailed message be left on voicemail: yes    Reason for Call: called to schedule a \"follow Up\" after hospital visit, to discuss tracheostomy decannulation. I do not see anything in guide line about traches but per AVS it said they needed to follow up with pulm. Please call Britta back if we can schedule if not please direct her to the correct clinic     Action Taken: Message routed to:  [facility] PULM  "

## 2019-04-02 DIAGNOSIS — J95.03 TRACHEAL STENOSIS FOLLOWING TRACHEOSTOMY (H): Primary | ICD-10-CM

## 2019-04-02 NOTE — TELEPHONE ENCOUNTER
Spoke with Britta at care facility. Patient scheduled to follow up with Dr. Leger in May. Ok to keep that appointment unless patient has symptoms and needs to come in sooner.

## 2019-04-04 ENCOUNTER — APPOINTMENT (OUTPATIENT)
Dept: CT IMAGING | Facility: CLINIC | Age: 79
End: 2019-04-04
Attending: EMERGENCY MEDICINE
Payer: COMMERCIAL

## 2019-04-04 ENCOUNTER — HOSPITAL ENCOUNTER (EMERGENCY)
Facility: CLINIC | Age: 79
Discharge: HOME | End: 2019-04-04
Attending: EMERGENCY MEDICINE | Admitting: EMERGENCY MEDICINE
Payer: COMMERCIAL

## 2019-04-04 ENCOUNTER — APPOINTMENT (OUTPATIENT)
Dept: GENERAL RADIOLOGY | Facility: CLINIC | Age: 79
End: 2019-04-04
Attending: EMERGENCY MEDICINE
Payer: COMMERCIAL

## 2019-04-04 VITALS
SYSTOLIC BLOOD PRESSURE: 147 MMHG | DIASTOLIC BLOOD PRESSURE: 66 MMHG | RESPIRATION RATE: 16 BRPM | HEART RATE: 70 BPM | OXYGEN SATURATION: 100 % | TEMPERATURE: 96.1 F

## 2019-04-04 DIAGNOSIS — N39.0 URINARY TRACT INFECTION WITHOUT HEMATURIA, SITE UNSPECIFIED: ICD-10-CM

## 2019-04-04 DIAGNOSIS — S22.080A T12 COMPRESSION FRACTURE (H): ICD-10-CM

## 2019-04-04 LAB
ALBUMIN SERPL-MCNC: 3.2 G/DL (ref 3.4–5)
ALBUMIN UR-MCNC: 10 MG/DL
ALP SERPL-CCNC: 104 U/L (ref 40–150)
ALT SERPL W P-5'-P-CCNC: 19 U/L (ref 0–50)
ANION GAP SERPL CALCULATED.3IONS-SCNC: 8 MMOL/L (ref 3–14)
APPEARANCE UR: CLEAR
AST SERPL W P-5'-P-CCNC: 27 U/L (ref 0–45)
BACTERIA #/AREA URNS HPF: ABNORMAL /HPF
BASOPHILS # BLD AUTO: 0.1 10E9/L (ref 0–0.2)
BASOPHILS NFR BLD AUTO: 1.2 %
BILIRUB SERPL-MCNC: 1 MG/DL (ref 0.2–1.3)
BILIRUB UR QL STRIP: NEGATIVE
BUN SERPL-MCNC: 22 MG/DL (ref 7–30)
CALCIUM SERPL-MCNC: 9 MG/DL (ref 8.5–10.1)
CHLORIDE SERPL-SCNC: 100 MMOL/L (ref 94–109)
CO2 SERPL-SCNC: 24 MMOL/L (ref 20–32)
COLOR UR AUTO: ABNORMAL
CREAT SERPL-MCNC: 0.97 MG/DL (ref 0.52–1.04)
DIFFERENTIAL METHOD BLD: ABNORMAL
EOSINOPHIL # BLD AUTO: 0.4 10E9/L (ref 0–0.7)
EOSINOPHIL NFR BLD AUTO: 5.3 %
ERYTHROCYTE [DISTWIDTH] IN BLOOD BY AUTOMATED COUNT: 17.5 % (ref 10–15)
GFR SERPL CREATININE-BSD FRML MDRD: 57 ML/MIN/{1.73_M2}
GLUCOSE SERPL-MCNC: 79 MG/DL (ref 70–99)
GLUCOSE UR STRIP-MCNC: NEGATIVE MG/DL
HCT VFR BLD AUTO: 31.7 % (ref 35–47)
HGB BLD-MCNC: 9.1 G/DL (ref 11.7–15.7)
HGB UR QL STRIP: NEGATIVE
IMM GRANULOCYTES # BLD: 0 10E9/L (ref 0–0.4)
IMM GRANULOCYTES NFR BLD: 0.5 %
INR PPP: 2.66 (ref 0.86–1.14)
KETONES UR STRIP-MCNC: NEGATIVE MG/DL
LEUKOCYTE ESTERASE UR QL STRIP: ABNORMAL
LYMPHOCYTES # BLD AUTO: 1.4 10E9/L (ref 0.8–5.3)
LYMPHOCYTES NFR BLD AUTO: 21.3 %
MCH RBC QN AUTO: 24.6 PG (ref 26.5–33)
MCHC RBC AUTO-ENTMCNC: 28.7 G/DL (ref 31.5–36.5)
MCV RBC AUTO: 86 FL (ref 78–100)
MONOCYTES # BLD AUTO: 0.5 10E9/L (ref 0–1.3)
MONOCYTES NFR BLD AUTO: 7.2 %
NEUTROPHILS # BLD AUTO: 4.3 10E9/L (ref 1.6–8.3)
NEUTROPHILS NFR BLD AUTO: 64.5 %
NITRATE UR QL: POSITIVE
NRBC # BLD AUTO: 0 10*3/UL
NRBC BLD AUTO-RTO: 0 /100
PH UR STRIP: 6 PH (ref 5–7)
PLATELET # BLD AUTO: 339 10E9/L (ref 150–450)
PLATELET # BLD EST: ABNORMAL 10*3/UL
POTASSIUM SERPL-SCNC: 4.1 MMOL/L (ref 3.4–5.3)
PROT SERPL-MCNC: 7.6 G/DL (ref 6.8–8.8)
RADIOLOGIST FLAGS: ABNORMAL
RBC # BLD AUTO: 3.7 10E12/L (ref 3.8–5.2)
RBC #/AREA URNS AUTO: 3 /HPF (ref 0–2)
SODIUM SERPL-SCNC: 132 MMOL/L (ref 133–144)
SOURCE: ABNORMAL
SP GR UR STRIP: 1.01 (ref 1–1.03)
TROPONIN I SERPL-MCNC: <0.015 UG/L (ref 0–0.04)
UROBILINOGEN UR STRIP-MCNC: NORMAL MG/DL (ref 0–2)
WBC # BLD AUTO: 6.7 10E9/L (ref 4–11)
WBC #/AREA URNS AUTO: 37 /HPF (ref 0–5)
WBC CLUMPS #/AREA URNS HPF: PRESENT /HPF

## 2019-04-04 PROCEDURE — 99285 EMERGENCY DEPT VISIT HI MDM: CPT | Mod: 25 | Performed by: EMERGENCY MEDICINE

## 2019-04-04 PROCEDURE — 85025 COMPLETE CBC W/AUTO DIFF WBC: CPT | Performed by: EMERGENCY MEDICINE

## 2019-04-04 PROCEDURE — 93010 ELECTROCARDIOGRAM REPORT: CPT | Mod: Z6 | Performed by: EMERGENCY MEDICINE

## 2019-04-04 PROCEDURE — 84484 ASSAY OF TROPONIN QUANT: CPT | Performed by: EMERGENCY MEDICINE

## 2019-04-04 PROCEDURE — 93005 ELECTROCARDIOGRAM TRACING: CPT | Performed by: EMERGENCY MEDICINE

## 2019-04-04 PROCEDURE — 81001 URINALYSIS AUTO W/SCOPE: CPT | Performed by: EMERGENCY MEDICINE

## 2019-04-04 PROCEDURE — 72080 X-RAY EXAM THORACOLMB 2/> VW: CPT

## 2019-04-04 PROCEDURE — 87086 URINE CULTURE/COLONY COUNT: CPT | Performed by: EMERGENCY MEDICINE

## 2019-04-04 PROCEDURE — 74174 CTA ABD&PLVS W/CONTRAST: CPT

## 2019-04-04 PROCEDURE — 25000132 ZZH RX MED GY IP 250 OP 250 PS 637: Performed by: EMERGENCY MEDICINE

## 2019-04-04 PROCEDURE — 87088 URINE BACTERIA CULTURE: CPT | Performed by: EMERGENCY MEDICINE

## 2019-04-04 PROCEDURE — 80053 COMPREHEN METABOLIC PANEL: CPT | Performed by: EMERGENCY MEDICINE

## 2019-04-04 PROCEDURE — 85610 PROTHROMBIN TIME: CPT | Performed by: EMERGENCY MEDICINE

## 2019-04-04 PROCEDURE — 25000128 H RX IP 250 OP 636: Performed by: STUDENT IN AN ORGANIZED HEALTH CARE EDUCATION/TRAINING PROGRAM

## 2019-04-04 PROCEDURE — 87186 SC STD MICRODIL/AGAR DIL: CPT | Performed by: EMERGENCY MEDICINE

## 2019-04-04 RX ORDER — HYDROCODONE BITARTRATE AND ACETAMINOPHEN 5; 325 MG/1; MG/1
1 TABLET ORAL EVERY 6 HOURS PRN
Qty: 12 TABLET | Refills: 0 | Status: SHIPPED | OUTPATIENT
Start: 2019-04-04 | End: 2019-07-09

## 2019-04-04 RX ORDER — HYDROCODONE BITARTRATE AND ACETAMINOPHEN 5; 325 MG/1; MG/1
1 TABLET ORAL ONCE
Status: COMPLETED | OUTPATIENT
Start: 2019-04-04 | End: 2019-04-04

## 2019-04-04 RX ORDER — IOPAMIDOL 755 MG/ML
100 INJECTION, SOLUTION INTRAVASCULAR ONCE
Status: COMPLETED | OUTPATIENT
Start: 2019-04-04 | End: 2019-04-04

## 2019-04-04 RX ORDER — SULFAMETHOXAZOLE/TRIMETHOPRIM 800-160 MG
1 TABLET ORAL 2 TIMES DAILY
Qty: 20 TABLET | Refills: 0 | Status: SHIPPED | OUTPATIENT
Start: 2019-04-04 | End: 2019-07-01

## 2019-04-04 RX ADMIN — IOPAMIDOL 100 ML: 755 INJECTION, SOLUTION INTRAVENOUS at 17:38

## 2019-04-04 RX ADMIN — HYDROCODONE BITARTRATE AND ACETAMINOPHEN 1 TABLET: 5; 325 TABLET ORAL at 20:05

## 2019-04-04 ASSESSMENT — ENCOUNTER SYMPTOMS
ARTHRALGIAS: 0
BACK PAIN: 1
FEVER: 0
WEAKNESS: 0
EYE REDNESS: 0
ABDOMINAL PAIN: 0
NECK STIFFNESS: 0
HEADACHES: 0
DIFFICULTY URINATING: 0
COLOR CHANGE: 0
NUMBNESS: 0
CONSTIPATION: 0
SHORTNESS OF BREATH: 0
CONFUSION: 0

## 2019-04-04 NOTE — ED AVS SNAPSHOT
[facility], [place], Emergency Department  [address] [place] [id number]  Phone:  [phone]                                    Priya Funez   MRN: [id number]    Department:  [facility], Emergency Department   Date of Visit:  4/4/2019           After Visit Summary Signature Page    I have received my discharge instructions, and my questions have been answered. I have discussed any challenges I see with this plan with the nurse or doctor.    ..........................................................................................................................................  Patient/Patient Representative Signature      ..........................................................................................................................................  Patient Representative Print Name and Relationship to Patient    ..................................................               ................................................  Date                                   Time    ..........................................................................................................................................  Reviewed by Signature/Title    ...................................................              ..............................................  Date                                               Time          22EPIC Rev 08/18

## 2019-04-04 NOTE — ED TRIAGE NOTES
Pt was pushing a wheelchair a week ago and now is complaining of back pain in mid and lower area.

## 2019-04-04 NOTE — ED PROVIDER NOTES
[place] EMERGENCY DEPARTMENT ([facility])  4/04/19 Hallway X   History     Chief Complaint   Patient presents with     Back Pain     The history is provided by the patient and medical records.     Priya Funez is a 78 year old female who presents with back pain for the past week that worsens with movement. She has a prior history of ascending aortic dissection status post grafting repair on 1/4/19, type 2 diabetes on insulin, chronic atrial fibrillation status post AV node ablation and permanent pacemaker placement on 1/11/19.  This is complicated by acute respiratory failure requiring intubation and tracheostomy placement.  She was inpatient and was stabilized, ultimately transferred to [facility] and subsequently a TCU.  Patient states that she has been doing well and trying to work hard so that she can be discharged home.  She has been ambulating down the halls with assistance of nurse. Earlier this week she needs to go the bathroom.  She felt that she could manage this by herself, took her wheelchair to the bathroom and then transferred from wheelchair to walker given that the wheelchair was too wide to fit through the doorway.  She tried to push the wheelchair away but it was locked and she hurt her back in the process.  She has had ongoing midline mid back pain at the level of her belt line that is been persistent since then. The pain is sharp and shooting in nature.  Pain improves with rest and worsens with movement.  At the care facility they were concerned given her aortic dissection grafting earlier this year that she perhaps had a complication with this.  No lightheadedness or shortness of breath. Overall she has been recovering and was aiming to get discharged from the TCU. No numbness, weakness, tingling in any extremities. No problems with bowel or bladder incontinence. No other symptoms noted.     I have reviewed the Medications, Allergies, Past Medical and Surgical History, and Social  History in the [facility] system.  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, s/p Replacement of the ascending aorta with a 34 mm Hemashield branch graft 1/4/19     Hypertension      Persistent atrial fibrillation (H)     post-op 1/2019: AV node ablation and single chamber pacemaker implanted 1/11/2019     Pleural effusion, bilateral     s/p left thoracentesis of 150cc 1/11/19;  right thoracentesis of 400cc 1/24/2019       Past Surgical History:   Procedure Laterality Date     ANGIOGRAM Right 1/4/2019    Procedure: DIAGONSTIC ANGIOGRAM OF SMA;  Surgeon: Rigo Jennings MD;  Location:  OR     BYPASS GRAFT ARTERY CORONARY, REPAIR VALVE AORTIC, COMBINED N/A 1/4/2019    Procedure: AORTIC DISSECTION REPAIR WITH GRAFT- HEMASHIELD PLATINUM WOVEN DOUBLE VELOR 4 SIDE ARM VASCULAR GRAFT, D:35 X 12 X 10 X 10MM/ L:50CM;  Surgeon: Jose Winslow MD;  Location:  OR     EP ABLATION AV NODE N/A 1/11/2019    Procedure: EP Ablation AV Node;  Surgeon: Tsering Davey MD;  Location:  HEART CARDIAC CATH LAB     EP PACEMAKER Left 1/11/2019    Procedure: EP Pacemaker;  Surgeon: Tsering Davey MD;  Location:  HEART CARDIAC CATH LAB     TRACHEOSTOMY N/A 1/25/2019    Procedure: TRACHEOSTOMY  (DR MCCLELLAND);  Surgeon: Bryson Mcclelland MD;  Location:  OR       History reviewed. No pertinent family history.    Social History     Tobacco Use     Smoking status: Never Smoker     Smokeless tobacco: Never Used     Tobacco comment: hisband   Substance Use Topics     Alcohol use: No     Frequency: Never      Review of Systems   Constitutional: Negative for fever.   HENT: Negative for congestion.    Eyes: Negative for redness.   Respiratory: Negative for shortness of breath.    Cardiovascular: Negative for chest pain.   Gastrointestinal: Negative for abdominal pain and constipation.   Genitourinary: Negative for difficulty urinating.   Musculoskeletal: Positive for back pain.  Negative for arthralgias and neck stiffness.   Skin: Negative for color change.   Neurological: Negative for weakness, numbness and headaches.   Psychiatric/Behavioral: Negative for confusion.   All other systems reviewed and are negative.      Physical Exam   BP: 130/71  Pulse: 81  Temp: 96.1  F (35.6  C)  SpO2: 100 %      Physical Exam   Constitutional: She is oriented to person, place, and time. She appears well-developed and well-nourished. No distress.   HENT:   Head: Normocephalic and atraumatic.   Mouth/Throat: No oropharyngeal exudate.   Eyes: Pupils are equal, round, and reactive to light. Right eye exhibits no discharge. Left eye exhibits no discharge. No scleral icterus.   Neck: Normal range of motion. Neck supple.   Trach in place.    Cardiovascular: Normal rate, regular rhythm, normal heart sounds and intact distal pulses. Exam reveals no gallop and no friction rub.   No murmur heard.  Pulmonary/Chest: Effort normal and breath sounds normal. No respiratory distress. She has no wheezes. She exhibits no tenderness.   Abdominal: Soft. Bowel sounds are normal. She exhibits no distension. There is no tenderness.   Musculoskeletal: Normal range of motion. She exhibits no edema, tenderness or deformity.   Neurological: She is alert and oriented to person, place, and time. No cranial nerve deficit.   Full strength and sensation in upper and lower extremities bilaterally. No saddle anesthesia.    Skin: Skin is warm and dry. No rash noted. She is not diaphoretic. No erythema. No pallor.   Psychiatric: She has a normal mood and affect.   Nursing note and vitals reviewed.      ED Course        Procedures             EKG Interpretation:      Interpreted by Semaj Millan  Time reviewed: 1728  Symptoms at time of EKG: back pain   Rhythm: paced  Rate: 73  Axis: Normal  Ectopy: none  Conduction: n/a  ST Segments/ T Waves: No ST-T wave changes  Q Waves: nonspecific  Comparison to prior: Unchanged from  1/23/2019    Clinical Impression: no acute changes and paced rhythm                Critical Care time:  none             Labs Ordered and Resulted from Time of ED Arrival Up to the Time of Departure from the ED - No data to display         Assessments & Plan (with Medical Decision Making)   This is a 78-year-old female who presents with mid back pain.  Patient states this occurred after she was.  No notes mid back pain is worse with movement.  No lumbar or thoracic tenderness on exam.  No neuro deficits noted.  Pulses are equal.  Lab work showed no acute changes..  ECG shows paced rhythm with no acute changes.  Chest abdomen pelvis with contrast showed a new compression fracture of T12.  It showed no complications recent aortic dissection repair.  UA also showed a UTI.  Patient was given hydrocodone acetaminophen in the emergency department.  I discussed all results with the patient.  It appears that this compression fracture was caused by the injury that she describes and is likely the cause of her pain.  I discussed the case with neurosurgery who saw the patient.  They recommend follow-up in 4-6 weeks.  They offered patient bracing but she declines.  I discussed this treatment and pain control with the patient.  We will provide a small number of hydrocodone acetaminophen for breakthrough pain.  I discussed the risks including falls the patient and to exercise extreme caution if she does use this medication for her breakthrough pain.  We will also discharged with an antibiotic for urinary tract infection.  Will discharge home with return precautions. Discussed reasons to return to the emergency department.  Patient understands and agrees with this plan.    I have reviewed the nursing notes.    I have reviewed the findings, diagnosis, plan and need for follow up with the patient.       Medication List      ASK your doctor about these medications    acetaminophen 325 MG tablet  Commonly known as:  TYLENOL  650 mg,  Oral, EVERY 4 HOURS PRN  Ask about: Should I take this medication?     dextrose 50 % injection  25-50 mLs, Intravenous, EVERY 15 MIN PRN  Ask about: Should I take this medication?     ferrous sulfate 300 (60 Fe) MG/5ML syrup  220 mg, Oral, DAILY  Ask about: Should I take this medication?     glucagon 1 MG Solr injection  1 mg, Subcutaneous, EVERY 15 MIN PRN  Ask about: Should I take this medication?     glucose 40 % (400 mg/mL) Gel gel  15-30 g, Oral, EVERY 15 MIN PRN  Ask about: Should I take this medication?     hydrogen peroxide 3 % solution  Topical, EVERY 1 HOUR PRN  Ask about: Should I take this medication?     melatonin 5 MG sublingual tablet  5 mg, Sublingual, AT BEDTIME  Ask about: Should I take this medication?     miconazole 2 % external powder  Commonly known as:  MICATIN/MICRO GUARD  Topical, 2 TIMES DAILY  Ask about: Should I take this medication?     oxyCODONE 5 MG tablet  Commonly known as:  ROXICODONE  5-10 mg, Oral, EVERY 4 HOURS PRN  Ask about: Should I take this medication?     pantoprazole 2 mg/mL Susp suspension  Commonly known as:  PROTONIX  40 mg, Oral, EVERY MORNING BEFORE BREAKFAST  Ask about: Should I take this medication?            Final diagnoses:   None   I, Binta Lowe, am serving as a trained medical scribe to document services personally performed by Semaj Millan DO based on the provider's statements to me on 4/04/19.  This document has been checked and approved by the attending provider.    I, Semaj Millan DO, was physically present and have reviewed and verified the accuracy of this note documented by Binta Lowe medical scribe.        4/4/2019   [facility], [place], EMERGENCY DEPARTMENT     Semaj Millan DO  04/05/19 0052

## 2019-04-05 ENCOUNTER — NURSE TRIAGE (OUTPATIENT)
Dept: NURSING | Facility: CLINIC | Age: 79
End: 2019-04-05

## 2019-04-05 LAB
BACTERIA SPEC CULT: ABNORMAL
INTERPRETATION ECG - MUSE: NORMAL
Lab: ABNORMAL
SPECIMEN SOURCE: ABNORMAL

## 2019-04-05 NOTE — DISCHARGE INSTRUCTIONS
Continue follow-up with neurosurgery clinic in 4-6 weeks as scheduled. Return to the emergency department if symptoms get worse, there are any new symptoms or any cause for concern.

## 2019-04-05 NOTE — CONSULTS
Consult Date:  04/04/2019      HISTORY OF PRESENT ILLNESS:  Ms. Funez is a 78-year-old female with a history of atrial fibrillation, status post ablation and pacemaker placement, on chronic warfarin, hypertension, aortic aneurysm dissection, status post repair in 01/2019 who presents from a nursing home for evaluation of back pain.  Ms. Funez has been recovering in the nursing home since her aortic dissection repair.  She was moving a wheelchair out of her way in the restroom last Friday (3/29) when she had acute onset of low back pain.  She has had persistent low back pain since that time which has been poorly controlled with Tylenol other at her home.  She does not have any shooting pains in her legs.  No new numbness or weakness.  She also denies any changes in bowel or bladder function.      PAST MEDICAL HISTORY:   1.  Atrial fibrillation.   2.  Hypertension.   3.  Aortic aneurysm dissection.      PAST SURGICAL HISTORY:   1.  Pacemaker placemen.     2.  AV node ablation.   3.  Repair of aortic dissection.   4.  Tracheostomy placement.      CURRENT MEDICATIONS:   1.  Albuterol.   2.  Insulin.   3.  Albuterol/ipratropium.   4.  Protonix.   5.  Seroquel.   6.  Warfarin.      PHYSICAL EXAMINATION:   GENERAL:  This is an elderly female sitting in a hospital bed in no acute distress.   NEUROLOGIC:  Cranial nerves II-XII are intact.  She has a tracheostomy in place.  Strength is 5/5 throughout her upper and lower extremities.   Sensation is intact to light touch throughout.   MUSCULOSKELETAL:  Focal midline and paraspinous tenderness in the lower thoracic region.      IMAGING:  CT of the chest was reviewed and demonstrates acute compression of the T12 vertebral body without any canal compromise or retropulse fragments.      ASSESSMENT:  A 78-year-old female with acute T12 compression fracture with no canal compromise.  She is neurologically intact with back pain.  We discussed bracing with pain control and facilitate  healing of the fracture; however, the patient refused a brace.  We also discussed the evaluation and need for management of osteoporosis with her primary care doctor.      RECOMMENDATIONS:   1.  Upright AP and lateral x-rays of the thoracic spine.   2.  Pain control.   3.  Recommend to follow up with PCP for evaluation and management of osteoporosis.   4.  Follow up with Liya Hernandez in Neurosurgery Clinic in 4-6 weeks.      Plan was discussed with Dr. Prakash, who agrees.         VIBHA PRAKASH MD       As dictated by MIC WEST MD, PHD            D: 2019   T: 2019   MT: JOHN      Name:     CEM MORRIS   MRN:      [id number]        Account:       [id number]   :      1940           Consult Date:  2019      Document: [id number]

## 2019-04-05 NOTE — PROGRESS NOTES
Emergency Social Work Services Note    Date of  Intervention: 04/04/19  Last Emergency Department Visit:  3/29/19  Care Plan:  No  Collaborated with:  Patient; pt's family; Dr. Millan; KIKE Chairez    Data:  Pt presented to the ED with back pain from the TCU.  Notes indicate that pt is at [facility].  Pt has questions re: transport back to her TCU.    Intervention:  Chart reviewed.  SW met with pt and her 2 family members at her bedside.  She tells writer that she rode in an ambulance here and is worried about the ride back to her TCU.  She feels she could not tolerate a wheelchair van ride back due to the pain.  PRATEEK discussed that if a stretcher ride is arranged that it is unknown how much, if any, Medicare will cover.  Pt's family believes that a stretcher is necessary in this case given the level of pt's back pain.  PRATEEK informed pt and family that if she does transport via stretcher a Physician's Certification Statement would be completed to support need for stretcher.  Pt asked if w/c van ride is covered and with her insurance, there would be no coverage, thus it would be entirely private pay.    PRATEEK spoke with Dr. Millan who states that pt has a new T12 compression fracture.      PRATEEK met with pt and family again and they are in agreement with stretcher transport.  Pt states again she could not tolerate sitting in a wheelchair.  Reiterated coverage of stretcher is technically unknown until the claim goes through Medicare.  PRATEEK encouraged pt to ask for pre-medication to help tolerate the stretcher ride, if she needs.    The [address]  [address]  [phone]  Fax: [phone]    PRATEEK updated KIKE Chairez.  PCS form completed and placed on pt's chart.  [facility]    [facility] [phone]    Assessment:  Stretcher ride needed at time of discharge.    Plan:    Anticipated Disposition:  Facility:  The  [facility]    Barriers to d/c plan:  Medical clearance.    Follow Up:  On-call SW available until midnight.    GABRIELA Rust  Social Work Services  Emergency Department   [phone] phone  [phone] pager  On-call pager, [phone], 1600 to midnight

## 2019-04-05 NOTE — TELEPHONE ENCOUNTER
Nurse Manager Hina at [phone] ([facility] states Patient was seen at [facility] ED 4/4/19.  States has now returned to the care facility and the Caller states she does not have information on how to care for the patient.   Requesting discharge information. States she has the After Visit Summary (AVS).  This RN reviewed Epic chart and the AVS with the Caller.   This RN read to the Nurse Manager the [facility] 4/4/19 ED note:    ED  Discharged      4/4/2019 (6 hours)  [facility], [place], Emergency Department   Semaj Millan,    Last attending   Treatment team   T12 compression fracture (H) +1 more   Clinical impression   Back Pain   Chief complaint        ...  Assessments & Plan (with Medical Decision Making)   This is a 78-year-old female who presents with mid back pain.  Patient states this occurred after she was.  No notes mid back pain is worse with movement.  No lumbar or thoracic tenderness on exam.  No neuro deficits noted.  Pulses are equal.  Lab work showed no acute changes..  ECG shows paced rhythm with no acute changes.  Chest abdomen pelvis with contrast showed a new compression fracture of T12.  It showed no complications recent aortic dissection repair.  UA also showed a UTI.  Patient was given hydrocodone acetaminophen in the emergency department.  I discussed all results with the patient.  It appears that this compression fracture was caused by the injury that she describes and is likely the cause of her pain.  I discussed the case with neurosurgery who saw the patient.  They recommend follow-up in 4-6 weeks.  They offered patient bracing but she declines.  I discussed this treatment and pain control with the patient.  We will provide a small number of hydrocodone acetaminophen for breakthrough pain.  I discussed the risks including falls the patient and to exercise extreme caution if she does use this medication for her breakthrough pain.  We will also discharged with an  antibiotic for urinary tract infection.  Will discharge home with return precautions. Discussed reasons to return to the emergency department.  Patient understands and agrees with this plan.       Protocol-  Information  Care advice reviewed.   Disposition-  Advised to call [facility] ED number as noted on AVS for follow up plan of care. Advised Caller to also contact Patient's PCP for follow up plan.  Caller states understanding of the recommended disposition.   Advised to call back if further questions or concerns.     WENCESLAO Chavez RN  [facility]     Reason for Disposition    [1] Caller requesting NON-URGENT health information AND [2] PCP's office is the best resource    Protocols used: INFORMATION ONLY CALL-ADULT-

## 2019-05-01 ENCOUNTER — OFFICE VISIT (OUTPATIENT)
Dept: CARDIOLOGY | Facility: CLINIC | Age: 79
End: 2019-05-01
Payer: COMMERCIAL

## 2019-05-01 VITALS
SYSTOLIC BLOOD PRESSURE: 148 MMHG | HEART RATE: 89 BPM | OXYGEN SATURATION: 98 % | BODY MASS INDEX: 31.58 KG/M2 | HEIGHT: 64 IN | DIASTOLIC BLOOD PRESSURE: 82 MMHG | WEIGHT: 185 LBS

## 2019-05-01 DIAGNOSIS — I71.010 ASCENDING AORTIC DISSECTION (H): Primary | ICD-10-CM

## 2019-05-01 ASSESSMENT — MIFFLIN-ST. JEOR: SCORE: 1306.21

## 2019-05-01 NOTE — PROGRESS NOTES
"CV Surgery Post Op Follow Up Note:     Patient seen for surgical follow up. Underwent emergent aortic dissection repair on 1/4/19 by Dr. Jose Winslow. She had a complicated post operative course and was discharged to [facility] on ventilatory support. She recovered well at [facility] and was recently transitioned to [facility]. She presents to this appointment without current records, labs or medication list from facility she is currently at.     She continues with trach and has been capped for 26 hours at this point. She has an appointment to have trach removed later this month and is hopeful that appointment can be moved up. She complains of ongoing lower extremity edema for which she is wearing TEDs. She states she had labs drawn this am and the doctor at her TCU was going to consider diuretics. She is not sure what medications she is currently on for her blood pressure management. She has been increasing her mobility. She has been eating and recently had her PEG tube removed.      Exam:   /82   Pulse 89   Ht 1.613 m (5' 3.5\")   Wt 83.9 kg (185 lb)   SpO2 98%   BMI 32.26 kg/m    Gen: NAD  Ext: 1-2+ lower extremity edema  Incisions: CDI. Well healed    Plan:   Defer medical management to providers at TCU  Will need routine imaging surveillance for dissection with a repeat CT scan at about 6months from original surgery date  Will need to follow with cardiologist  Instructed patient to call our office once she is discharged from TCU and we will arrange for further follow up in regards to her dissection.      Roxana Carbajal PA-C  CV Surgery  [facility]  Pager: [phone]  "

## 2019-05-02 ENCOUNTER — TELEPHONE (OUTPATIENT)
Dept: OTHER | Facility: CLINIC | Age: 79
End: 2019-05-02

## 2019-05-22 ENCOUNTER — TELEPHONE (OUTPATIENT)
Dept: CARDIOLOGY | Facility: CLINIC | Age: 79
End: 2019-05-22

## 2019-05-28 ENCOUNTER — TELEPHONE (OUTPATIENT)
Dept: OTOLARYNGOLOGY | Facility: CLINIC | Age: 79
End: 2019-05-28

## 2019-05-30 ENCOUNTER — OFFICE VISIT (OUTPATIENT)
Dept: OTOLARYNGOLOGY | Facility: CLINIC | Age: 79
End: 2019-05-30
Payer: COMMERCIAL

## 2019-05-30 VITALS
SYSTOLIC BLOOD PRESSURE: 150 MMHG | BODY MASS INDEX: 31.58 KG/M2 | DIASTOLIC BLOOD PRESSURE: 72 MMHG | WEIGHT: 185 LBS | HEIGHT: 64 IN

## 2019-05-30 DIAGNOSIS — Z93.0 TRACHEOSTOMY STATUS (H): Primary | ICD-10-CM

## 2019-05-30 DIAGNOSIS — J95.03 TRACHEAL STENOSIS FOLLOWING TRACHEOSTOMY (H): Primary | ICD-10-CM

## 2019-05-30 ASSESSMENT — MIFFLIN-ST. JEOR: SCORE: 1306.21

## 2019-05-30 NOTE — LETTER
5/30/2019       RE: Priya Funez  [address]  [facility] [address]     Dear Colleague,    Thank you for referring your patient, Priya Funez, to the [facility] EAR NOSE AND THROAT at [facility]. Please see a copy of my visit note below.        HISTORY OF PRESENT ILLNESS: Priya Funez is a 78 year old female with a history of an ascending aortic dissection and shortness of breath.  She had a replacement of the ascending aorta with a 34 mm graft aortic valve on January 4, 2019.  She also had an AV node ablation and permanent pacemaker placed on 1/11/2019.  She developed acute respiratory failure on 1/13/2019 despite attempts at optimization of BiPAP and high flow oxygen.  She was eventually treated with a tracheostomy on 1/25/2019.  She was referred to [facility] for rehab.  I saw her on 3/26/2019 she had a 5.0 Portex/Bivona tight to shaft tracheostomy tube she was interested in decannulation.  Her airway on examination appeared excellent at that time.  I recommended plugging and a decannulation.  She comes back now with being plugged around the clock without any difficulties for the last 20 days.  She had no airway symptoms no voice symptoms and stable swallowing.    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, s/p Replacement of the ascending aorta with a 34 mm Hemashield branch graft 1/4/19     Hypertension      Persistent atrial fibrillation (H)     post-op 1/2019: AV node ablation and single chamber pacemaker implanted 1/11/2019     Pleural effusion, bilateral     s/p  left thoracentesis of 150cc 1/11/19;  right thoracentesis of 400cc 1/24/2019       PAST SURGICAL HISTORY:   Past Surgical History:   Procedure Laterality Date     ANGIOGRAM Right 1/4/2019    Procedure: DIAGONSTIC ANGIOGRAM OF SMA;  Surgeon: Rigo Jennings MD;  Location:  OR     BYPASS GRAFT ARTERY CORONARY, REPAIR VALVE AORTIC, COMBINED N/A 1/4/2019    Procedure: AORTIC DISSECTION REPAIR WITH GRAFT- HEMASHIELD PLATINUM WOVEN DOUBLE VELOR 4 SIDE ARM VASCULAR GRAFT, D:35 X 12 X 10 X 10MM/ L:50CM;  Surgeon: Jose Winslow MD;  Location:  OR     EP ABLATION AV NODE N/A 1/11/2019    Procedure: EP Ablation AV Node;  Surgeon: Tsering Davey MD;  Location:  HEART CARDIAC CATH LAB     EP PACEMAKER Left 1/11/2019    Procedure: EP Pacemaker;  Surgeon: Tsering Davey MD;  Location:  HEART CARDIAC CATH LAB     TRACHEOSTOMY N/A 1/25/2019    Procedure: TRACHEOSTOMY  (DR MCCLELLAND);  Surgeon: Bryson Mcclelland MD;  Location:  OR       FAMILY HISTORY: No family history on file.    SOCIAL HISTORY:   Social History     Tobacco Use     Smoking status: Never Smoker     Smokeless tobacco: Never Used     Tobacco comment: hisband   Substance Use Topics     Alcohol use: No     Frequency: Never       REVIEW OF SYSTEMS: Ten point review of systems was performed and is negative except for:   [facility] ENT ROS 3/26/2019   Ears, Nose, Throat Trouble swallowing   Cardiopulmonary Cough, Breathing problems   Endocrine Frequent urination        ALLERGIES: Amoxicillin and Ciprofloxacin    MEDICATIONS:   Current Outpatient Medications   Medication Sig Dispense Refill     albuterol (PROVENTIL) (2.5 MG/3ML) 0.083% neb solution Take 1 vial (2.5 mg) by nebulization every 4 hours as needed for wheezing       aspirin (ASA) 81 MG EC tablet Take 81 mg by mouth every 24 hours       cholecalciferol (VITAMIN D-1000 MAX ST) 1000 units TABS Take 1,000 Units by mouth daily        insulin aspart (NOVOLOG PEN) 100 UNIT/ML pen Inject 1-6 Units  Subcutaneous every 4 hours 10.8 mL 0     ipratropium - albuterol 0.5 mg/2.5 mg/3 mL (DUONEB) 0.5-2.5 (3) MG/3ML neb solution Take 1 vial (3 mLs) by nebulization every 4 hours as needed for wheezing       metoprolol tartrate (LOPRESSOR) 25 MG tablet 1 tablet (25 mg) by Per Feeding Tube route 2 times daily 60 tablet 0     QUEtiapine (SEROQUEL) 25 MG tablet 1 tablet (25 mg) by Oral or NG Tube route every 6 hours as needed (agitation, delirium)       QUEtiapine (SEROQUEL) 25 MG tablet Take 1 tablet (25 mg) by mouth 2 times daily 60 tablet 0     warfarin (COUMADIN) 2 MG tablet Take 1 tablet (2 mg) by mouth daily       Warfarin Therapy Reminder 1 each continuous prn           PHYSICAL EXAMINATION:  She  is awake, alert and in no apparent distress.    Her tympanic membranes are clear and intact bilaterally. External auditory canals are clear.  Nasal exam shows a mild septal deviation without obstruction.  Examination of the oral cavity shows no suspicious lesions.  There is symmetric movement of the tongue and soft palate.    The oropharynx is clear.  Her neck is supple without significant adenopathy.  There is a Portex size 5.0 tight to shaft tracheostomy tube in place and plugged.  The stoma appears healthy.  Pulse is regular.  Upper airway is clear.   With her tracheostomy tube plugged she is able to breathe comfortably without stridor.  Cranial nerves II-XII are grossly intact.       IMPRESSION/PLAN: She has passed her decannulation trial and the tracheostomy tube was removed.  Gauze and tape were applied and supplies were given.  She was given instructions on how to take care of the tracheostomy stoma and I will see her back in 2 to 3 weeks for a wound check.    Again, thank you for allowing me to participate in the care of your patient.      Sincerely,    Valentin Leger MD

## 2019-05-30 NOTE — PATIENT INSTRUCTIONS
"Thank you for choosing [facility] today.    Please follow-up with Dr Leger in approximately 2 to 3 weeks for a wound check.    Dr. Leger recommended getting \"gentle for skin\" tape and changing the dressings as they get soiled or dirty. Do not submerged in water for a few days until you hear no air leaks from wound site. If you do run out of gauze, you can purchase 4 x 4 gauze at any local pharmacy.   Keep hand on dressing when speaking to make talking easier.      If you have any further questions or concerns, call us at [phone].      "

## 2019-05-30 NOTE — PROGRESS NOTES
HISTORY OF PRESENT ILLNESS: Priya Funez is a 78 year old female with a history of an ascending aortic dissection and shortness of breath.  She had a replacement of the ascending aorta with a 34 mm graft aortic valve on January 4, 2019.  She also had an AV node ablation and permanent pacemaker placed on 1/11/2019.  She developed acute respiratory failure on 1/13/2019 despite attempts at optimization of BiPAP and high flow oxygen.  She was eventually treated with a tracheostomy on 1/25/2019.  She was referred to [facility] for rehab.  I saw her on 3/26/2019 she had a 5.0 Portex/Bivona tight to shaft tracheostomy tube she was interested in decannulation.  Her airway on examination appeared excellent at that time.  I recommended plugging and a decannulation.  She comes back now with being plugged around the clock without any difficulties for the last 20 days.  She had no airway symptoms no voice symptoms and stable swallowing.    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, s/p Replacement of the ascending aorta with a 34 mm Hemashield branch graft 1/4/19     Hypertension      Persistent atrial fibrillation (H)     post-op 1/2019: AV node ablation and single chamber pacemaker implanted 1/11/2019     Pleural effusion, bilateral     s/p left thoracentesis of 150cc 1/11/19;  right thoracentesis of 400cc 1/24/2019       PAST SURGICAL HISTORY:   Past Surgical History:   Procedure Laterality Date     ANGIOGRAM Right 1/4/2019    Procedure: DIAGONSTIC ANGIOGRAM OF SMA;  Surgeon: Rigo Jennings MD;  Location:  OR      BYPASS GRAFT ARTERY CORONARY, REPAIR VALVE AORTIC, COMBINED N/A 1/4/2019    Procedure: AORTIC DISSECTION REPAIR WITH GRAFT- HEMASHIELD PLATINUM WOVEN DOUBLE VELOR 4 SIDE ARM VASCULAR GRAFT, D:35 X 12 X 10 X 10MM/ L:50CM;  Surgeon: Jose Winslow MD;  Location:  OR     EP ABLATION AV NODE N/A 1/11/2019    Procedure: EP Ablation AV Node;  Surgeon: Tsering Davey MD;  Location:  HEART CARDIAC CATH LAB     EP PACEMAKER Left 1/11/2019    Procedure: EP Pacemaker;  Surgeon: Tsering Davey MD;  Location:  HEART CARDIAC CATH LAB     TRACHEOSTOMY N/A 1/25/2019    Procedure: TRACHEOSTOMY  (DR MCCLELLAND);  Surgeon: Bryson Mcclelland MD;  Location:  OR       FAMILY HISTORY: No family history on file.    SOCIAL HISTORY:   Social History     Tobacco Use     Smoking status: Never Smoker     Smokeless tobacco: Never Used     Tobacco comment: hisband   Substance Use Topics     Alcohol use: No     Frequency: Never       REVIEW OF SYSTEMS: Ten point review of systems was performed and is negative except for:   [facility] E[facility] ROS 3/26/2019   Ears, Nose, Throat Trouble swallowing   Cardiopulmonary Cough, Breathing problems   Endocrine Frequent urination        ALLERGIES: Amoxicillin and Ciprofloxacin    MEDICATIONS:   Current Outpatient Medications   Medication Sig Dispense Refill     albuterol (PROVENTIL) (2.5 MG/3ML) 0.083% neb solution Take 1 vial (2.5 mg) by nebulization every 4 hours as needed for wheezing       aspirin (ASA) 81 MG EC tablet Take 81 mg by mouth every 24 hours       cholecalciferol (VITAMIN D-1000 MAX ST) 1000 units TABS Take 1,000 Units by mouth daily        insulin aspart (NOVOLOG PEN) 100 UNIT/ML pen Inject 1-6 Units Subcutaneous every 4 hours 10.8 mL 0     ipratropium - albuterol 0.5 mg/2.5 mg/3 mL (DUONEB) 0.5-2.5 (3) MG/3ML neb solution Take 1 vial (3 mLs) by nebulization every 4 hours as needed for wheezing       metoprolol tartrate (LOPRESSOR) 25 MG tablet 1 tablet (25 mg) by Per Feeding Tube route 2  times daily 60 tablet 0     QUEtiapine (SEROQUEL) 25 MG tablet 1 tablet (25 mg) by Oral or NG Tube route every 6 hours as needed (agitation, delirium)       QUEtiapine (SEROQUEL) 25 MG tablet Take 1 tablet (25 mg) by mouth 2 times daily 60 tablet 0     warfarin (COUMADIN) 2 MG tablet Take 1 tablet (2 mg) by mouth daily       Warfarin Therapy Reminder 1 each continuous prn           PHYSICAL EXAMINATION:  She  is awake, alert and in no apparent distress.    Her tympanic membranes are clear and intact bilaterally. External auditory canals are clear.  Nasal exam shows a mild septal deviation without obstruction.  Examination of the oral cavity shows no suspicious lesions.  There is symmetric movement of the tongue and soft palate.    The oropharynx is clear.  Her neck is supple without significant adenopathy.  There is a Portex size 5.0 tight to shaft tracheostomy tube in place and plugged.  The stoma appears healthy.  Pulse is regular.  Upper airway is clear.  With her tracheostomy tube plugged she is able to breathe comfortably without stridor.  Cranial nerves II-XII are grossly intact.           IMPRESSION/PLAN: She has passed her decannulation trial and the tracheostomy tube was removed.  Gauze and tape were applied and supplies were given.  She was given instructions on how to take care of the tracheostomy stoma and I will see her back in 2 to 3 weeks for a wound check.

## 2019-06-03 ENCOUNTER — ANCILLARY PROCEDURE (OUTPATIENT)
Dept: CARDIOLOGY | Facility: CLINIC | Age: 79
End: 2019-06-03
Attending: INTERNAL MEDICINE
Payer: COMMERCIAL

## 2019-06-03 DIAGNOSIS — Z95.0 CARDIAC PACEMAKER IN SITU: ICD-10-CM

## 2019-06-04 PROCEDURE — 93296 REM INTERROG EVL PM/IDS: CPT | Performed by: INTERNAL MEDICINE

## 2019-06-04 PROCEDURE — 93294 REM INTERROG EVL PM/LDLS PM: CPT | Performed by: INTERNAL MEDICINE

## 2019-06-09 LAB
MDC_IDC_LEAD_IMPLANT_DT: NORMAL
MDC_IDC_LEAD_LOCATION: NORMAL
MDC_IDC_LEAD_LOCATION_DETAIL_1: NORMAL
MDC_IDC_LEAD_MFG: NORMAL
MDC_IDC_LEAD_MODEL: NORMAL
MDC_IDC_LEAD_POLARITY_TYPE: NORMAL
MDC_IDC_LEAD_SERIAL: NORMAL
MDC_IDC_MSMT_BATTERY_DTM: NORMAL
MDC_IDC_MSMT_BATTERY_REMAINING_LONGEVITY: 153 MO
MDC_IDC_MSMT_BATTERY_RRT_TRIGGER: 2.62
MDC_IDC_MSMT_BATTERY_STATUS: NORMAL
MDC_IDC_MSMT_BATTERY_VOLTAGE: 3.12 V
MDC_IDC_MSMT_LEADCHNL_RV_IMPEDANCE_VALUE: 361 OHM
MDC_IDC_MSMT_LEADCHNL_RV_IMPEDANCE_VALUE: 418 OHM
MDC_IDC_MSMT_LEADCHNL_RV_PACING_THRESHOLD_AMPLITUDE: 0.75 V
MDC_IDC_MSMT_LEADCHNL_RV_PACING_THRESHOLD_PULSEWIDTH: 0.4 MS
MDC_IDC_MSMT_LEADCHNL_RV_SENSING_INTR_AMPL: 4.75 MV
MDC_IDC_PG_IMPLANT_DTM: NORMAL
MDC_IDC_PG_MFG: NORMAL
MDC_IDC_PG_MODEL: NORMAL
MDC_IDC_PG_SERIAL: NORMAL
MDC_IDC_PG_TYPE: NORMAL
MDC_IDC_SESS_CLINIC_NAME: NORMAL
MDC_IDC_SESS_DTM: NORMAL
MDC_IDC_SESS_TYPE: NORMAL
MDC_IDC_SET_BRADY_HYSTRATE: NORMAL
MDC_IDC_SET_BRADY_LOWRATE: 70 {BEATS}/MIN
MDC_IDC_SET_BRADY_MAX_SENSOR_RATE: 120 {BEATS}/MIN
MDC_IDC_SET_BRADY_MODE: NORMAL
MDC_IDC_SET_LEADCHNL_RV_PACING_AMPLITUDE: 2 V
MDC_IDC_SET_LEADCHNL_RV_PACING_ANODE_ELECTRODE_1: NORMAL
MDC_IDC_SET_LEADCHNL_RV_PACING_ANODE_LOCATION_1: NORMAL
MDC_IDC_SET_LEADCHNL_RV_PACING_CAPTURE_MODE: NORMAL
MDC_IDC_SET_LEADCHNL_RV_PACING_CATHODE_ELECTRODE_1: NORMAL
MDC_IDC_SET_LEADCHNL_RV_PACING_CATHODE_LOCATION_1: NORMAL
MDC_IDC_SET_LEADCHNL_RV_PACING_POLARITY: NORMAL
MDC_IDC_SET_LEADCHNL_RV_PACING_PULSEWIDTH: 0.4 MS
MDC_IDC_SET_LEADCHNL_RV_SENSING_ANODE_ELECTRODE_1: NORMAL
MDC_IDC_SET_LEADCHNL_RV_SENSING_ANODE_LOCATION_1: NORMAL
MDC_IDC_SET_LEADCHNL_RV_SENSING_CATHODE_ELECTRODE_1: NORMAL
MDC_IDC_SET_LEADCHNL_RV_SENSING_CATHODE_LOCATION_1: NORMAL
MDC_IDC_SET_LEADCHNL_RV_SENSING_POLARITY: NORMAL
MDC_IDC_SET_LEADCHNL_RV_SENSING_SENSITIVITY: 0.9 MV
MDC_IDC_SET_ZONE_DETECTION_INTERVAL: 360 MS
MDC_IDC_SET_ZONE_TYPE: NORMAL
MDC_IDC_STAT_BRADY_DTM_END: NORMAL
MDC_IDC_STAT_BRADY_DTM_START: NORMAL
MDC_IDC_STAT_BRADY_RV_PERCENT_PACED: 99.98 %
MDC_IDC_STAT_EPISODE_RECENT_COUNT: 0
MDC_IDC_STAT_EPISODE_RECENT_COUNT: 0
MDC_IDC_STAT_EPISODE_RECENT_COUNT_DTM_END: NORMAL
MDC_IDC_STAT_EPISODE_RECENT_COUNT_DTM_END: NORMAL
MDC_IDC_STAT_EPISODE_RECENT_COUNT_DTM_START: NORMAL
MDC_IDC_STAT_EPISODE_RECENT_COUNT_DTM_START: NORMAL
MDC_IDC_STAT_EPISODE_TOTAL_COUNT: 0
MDC_IDC_STAT_EPISODE_TOTAL_COUNT: 0
MDC_IDC_STAT_EPISODE_TOTAL_COUNT_DTM_END: NORMAL
MDC_IDC_STAT_EPISODE_TOTAL_COUNT_DTM_END: NORMAL
MDC_IDC_STAT_EPISODE_TOTAL_COUNT_DTM_START: NORMAL
MDC_IDC_STAT_EPISODE_TOTAL_COUNT_DTM_START: NORMAL
MDC_IDC_STAT_EPISODE_TYPE: NORMAL

## 2019-06-11 ENCOUNTER — DOCUMENTATION ONLY (OUTPATIENT)
Dept: CARE COORDINATION | Facility: CLINIC | Age: 79
End: 2019-06-11

## 2019-06-14 ENCOUNTER — ANCILLARY PROCEDURE (OUTPATIENT)
Dept: GENERAL RADIOLOGY | Facility: CLINIC | Age: 79
End: 2019-06-14
Attending: STUDENT IN AN ORGANIZED HEALTH CARE EDUCATION/TRAINING PROGRAM
Payer: COMMERCIAL

## 2019-06-14 ENCOUNTER — OFFICE VISIT (OUTPATIENT)
Dept: NEUROSURGERY | Facility: CLINIC | Age: 79
End: 2019-06-14
Payer: COMMERCIAL

## 2019-06-14 VITALS
OXYGEN SATURATION: 99 % | HEIGHT: 64 IN | TEMPERATURE: 97.6 F | RESPIRATION RATE: 16 BRPM | DIASTOLIC BLOOD PRESSURE: 78 MMHG | HEART RATE: 77 BPM | SYSTOLIC BLOOD PRESSURE: 122 MMHG | BODY MASS INDEX: 27.3 KG/M2 | WEIGHT: 159.9 LBS

## 2019-06-14 DIAGNOSIS — M80.00XD AGE-RELATED OSTEOPOROSIS WITH CURRENT PATHOLOGICAL FRACTURE WITH ROUTINE HEALING, SUBSEQUENT ENCOUNTER: ICD-10-CM

## 2019-06-14 DIAGNOSIS — S22.000D CLOSED COMPRESSION FRACTURE OF THORACIC VERTEBRA WITH ROUTINE HEALING, SUBSEQUENT ENCOUNTER: Primary | ICD-10-CM

## 2019-06-14 DIAGNOSIS — S22.080A T12 COMPRESSION FRACTURE (H): ICD-10-CM

## 2019-06-14 RX ORDER — POTASSIUM CHLORIDE 1.5 G/1.58G
20 POWDER, FOR SOLUTION ORAL DAILY
COMMUNITY
Start: 2019-05-30 | End: 2019-10-15

## 2019-06-14 RX ORDER — CEPHALEXIN 500 MG/1
CAPSULE ORAL
Refills: 0 | COMMUNITY
Start: 2019-06-03 | End: 2019-07-01

## 2019-06-14 RX ORDER — MIRTAZAPINE 15 MG/1
15 TABLET, FILM COATED ORAL
COMMUNITY
Start: 2019-05-30 | End: 2020-07-17

## 2019-06-14 RX ORDER — TORSEMIDE 20 MG/1
20 TABLET ORAL DAILY
Refills: 3 | COMMUNITY
Start: 2019-06-10 | End: 2019-07-19

## 2019-06-14 ASSESSMENT — MIFFLIN-ST. JEOR: SCORE: 1192.43

## 2019-06-14 NOTE — LETTER
6/14/2019       RE: Priya Funez  [address]  [facility] [address]     Dear Colleague,    Thank you for referring your patient, Priya Funez, to the [facility] NEUROSURGERY at [facility]. Please see a copy of my visit note below.    Service Date: 06/14/2019      REASON FOR VISIT:  Followup after 04/04/2019 ER visit.      HISTORY OF PRESENT ILLNESS:  Ms. 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  She was in a nursing home, which she had been at since her aortic dissection repair and was moving a wheelchair which was too heavy she states and developed low back pain that was persistent for 6 days and then she came to the ER for evaluation.  On evaluation in the ER, she was found to have a T12 mild compression deformity.  At that time, she was evaluated and seen to be neurologically intact.  We offered her brace and she refused it at that time.      Today, she reports that she has been slowly getting better; however, she does still have pain that is intermittently intolerable.  She has been taking Tylenol to help with that pain.  She denies any new weakness or numbness in her lower extremities.  She has no bowel or bladder difficulties.      She is very hesitant to have a brace because she is extremely claustrophobic after having her tracheostomy done after her aortic dissection repair.  She was here today with her  and they discussed these concerns.  She would not want any kyphoplasty either and is hesitant about any real interventions for the pain.      Regarding the pain, it is in the lower back.  It is nonradiating.  It comes and goes throughout the day.  Nothing seems to make it better, but activity seems to make  it worse.  It does not seem to be worse in the morning or the evening.  Her primary care physician is encouraging her to be evaluated for osteoporosis.  She has taken medications in the past but has stopped taking them because insurance stopped covering the one that she was initially taking and the second one she was started on made her very nauseated, so she discontinued it on her own.      PAST MEDICAL HISTORY:   1.  Atrial fibrillation   2.  Hypertension.   3.  Aortic aneurysm dissection.   4.  Osteoporosis.      PAST SURGICAL HISTORY:   1.  Pacemaker.   2.  AV node ablation.     3.  Aortic dissection repair.   4.  Tracheostomy.      MEDICATIONS:   1.  Albuterol.   2.  Insulin.   3.  Albuterol, ipratropium.   4.  Protonix and Seroquel.   5.  Warfarin.      PHYSICAL EXAMINATION:  She is sitting in a chair in no acute distress.  She appears comfortable and appears her stated age.        On neurologic exam her Cranial nerves appear grossly intact.  Her strength in her upper extremities is 5/5.  In her lower extremities she was 5/5 in hip flexion, hip abduction and adduction, knee flexion/extension, ankle flexion and dorsiflexion.  Her sensation is intact to light touch.  She does have edema in bilateral lower extremities which she says is slowly getting worse over the past year.      IMAGING:  X-rays were obtained today which shows slight wedging of a compression fracture and increased collapsing but no clear kyphosis compared to her original films 3 months ago.      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  She is neurologically intact.      PLAN:  Our recommendations would be to have her get a [place] brace for comfort.  This is not something she necessarily needs to wear, but may help with her discomfort that she continues to have.  We would also like her to follow up with our osteoporosis colleagues for further evaluation.  Additionally, we  would like to see her back in about 6 weeks with another set of x-rays.  This was all discussed at length with the patient and her  and they were in agreement with the plan.   Dictated by Tavia Santos MD, Resident.       I have seen the patient with the resident and have discussed and agree with this note.  SAEED DEAN MD       D: 2019   T: 2019   MT: LG      Name:     CEM MORRIS   MRN:      [id number]        Account:      [id number]   :      1940           Service Date: 2019      Document: [id number]

## 2019-06-14 NOTE — PROGRESS NOTES
Service Date: 06/14/2019      REASON FOR VISIT:  Followup after 04/04/2019 ER visit.      HISTORY OF PRESENT ILLNESS:  Ms. 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  She was in a nursing home, which she had been at since her aortic dissection repair and was moving a wheelchair which was too heavy she states and developed low back pain that was persistent for 6 days and then she came to the ER for evaluation.  On evaluation in the ER, she was found to have a T12 mild compression deformity.  At that time, she was evaluated and seen to be neurologically intact.  We offered her brace and she refused it at that time.      Today, she reports that she has been slowly getting better; however, she does still have pain that is intermittently intolerable.  She has been taking Tylenol to help with that pain.  She denies any new weakness or numbness in her lower extremities.  She has no bowel or bladder difficulties.      She is very hesitant to have a brace because she is extremely claustrophobic after having her tracheostomy done after her aortic dissection repair.  She was here today with her  and they discussed these concerns.  She would not want any kyphoplasty either and is hesitant about any real interventions for the pain.      Regarding the pain, it is in the lower back.  It is nonradiating.  It comes and goes throughout the day.  Nothing seems to make it better, but activity seems to make it worse.  It does not seem to be worse in the morning or the evening.  Her primary care physician is encouraging her to be evaluated for osteoporosis.  She has taken medications in the past but has stopped taking them because insurance stopped covering the one that she was initially  taking and the second one she was started on made her very nauseated, so she discontinued it on her own.      PAST MEDICAL HISTORY:   1.  Atrial fibrillation   2.  Hypertension.   3.  Aortic aneurysm dissection.   4.  Osteoporosis.      PAST SURGICAL HISTORY:   1.  Pacemaker.   2.  AV node ablation.     3.  Aortic dissection repair.   4.  Tracheostomy.      MEDICATIONS:   1.  Albuterol.   2.  Insulin.   3.  Albuterol, ipratropium.   4.  Protonix and Seroquel.   5.  Warfarin.      PHYSICAL EXAMINATION:  She is sitting in a chair in no acute distress.  She appears comfortable and appears her stated age.        On neurologic exam her Cranial nerves appear grossly intact.  Her strength in her upper extremities is 5/5.  In her lower extremities she was 5/5 in hip flexion, hip abduction and adduction, knee flexion/extension, ankle flexion and dorsiflexion.  Her sensation is intact to light touch.  She does have edema in bilateral lower extremities which she says is slowly getting worse over the past year.      IMAGING:  X-rays were obtained today which shows slight wedging of a compression fracture and increased collapsing but no clear kyphosis compared to her original films 3 months ago.      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  She is neurologically intact.      PLAN:  Our recommendations would be to have her get a Ramo brace for comfort.  This is not something she necessarily needs to wear, but may help with her discomfort that she continues to have.  We would also like her to follow up with our osteoporosis colleagues for further evaluation.  Additionally, we would like to see her back in about 6 weeks with another set of x-rays.  This was all discussed at length with the patient and her  and they were in agreement with the plan.   Dictated by Tavia Santos MD, Resident.       I have seen the patient with the resident and have  discussed and agree with this note.  SAEED DEAN MD       As dictated by SB CORLEY MD            D: 2019   T: 2019   MT: HONG      Name:     CEM MORRIS   MRN:      [id number]        Account:      [id number]   :      1940           Service Date: 2019      Document: [id number]

## 2019-06-14 NOTE — NURSING NOTE
Chief Complaint   Patient presents with     New Patient     [facility] - COMPRESSION FRACTURE     Results     XRAY     Alma Nieves

## 2019-06-17 ENCOUNTER — TELEPHONE (OUTPATIENT)
Dept: CARDIOLOGY | Facility: CLINIC | Age: 79
End: 2019-06-17

## 2019-06-17 NOTE — TELEPHONE ENCOUNTER
Patient called stating she would like to make an appointment with Dr. Smith. States she has been in facilities for a while after abdominal aortic dissection and was told to see a cardiologist. In review of chart, patient already has appointment with Dr. Birch for 8/19/19. Progress note from cardiothoracic team states once patient is discharged from TCU to contact them for further follow up plans. Advised patient speak with Angella Delarosa RN to determine urgency for cardiologist follow up after dissection. Patient verbalized understanding and agreement. Transferred call to Cardiothoracic nurse Angella for follow up.

## 2019-06-19 ENCOUNTER — OFFICE VISIT (OUTPATIENT)
Dept: CARDIOLOGY | Facility: CLINIC | Age: 79
End: 2019-06-19
Payer: COMMERCIAL

## 2019-06-19 VITALS
HEIGHT: 64 IN | WEIGHT: 161.9 LBS | BODY MASS INDEX: 27.64 KG/M2 | DIASTOLIC BLOOD PRESSURE: 76 MMHG | HEART RATE: 97 BPM | SYSTOLIC BLOOD PRESSURE: 130 MMHG

## 2019-06-19 DIAGNOSIS — Z98.890 S/P AORTIC DISSECTION REPAIR: Primary | ICD-10-CM

## 2019-06-19 ASSESSMENT — MIFFLIN-ST. JEOR: SCORE: 1201.43

## 2019-06-19 NOTE — PROGRESS NOTES
CV Surgery Post Op Follow Up Note:     Assessment/Plan:   Patient is accompanied to clinic by her .    Ms. Funez underwent emergent aortic dissection repair on 1/4/19 by Dr. Jose Winslow. She had a complicated post operative course and was discharged to [facility] on ventilatory support. She recovered well at [facility] and was recently transitioned to TCU. Her tracheostomy has since been discontinued and she is on room air. She was discharged from TCU two weeks ago and is thriving at home.  She is here today to discuss future follow up and ongoing care.    Patient has been following closely with her PCP who has been managing her blood pressure and fluid balance. She is on metoprolol and torsemide and her vital signs in clinic today are stable. She was placed on warfarin for atrial flutter with subsequent AVN ablation and PPM placement. She sounds to be in a regular rhythm today with occasional premature beats, rate <100. She denies palpitations, dizziness or syncopal symptoms.    PLAN:  I have arranged for her to follow up/establish with cardiology on July 1  She will have a device check and follow up with Kymberly Ruiz on 7/9.  I have ordered an echocardiogram and have asked patient to schedule this at her convenience.  Will need routine imaging surveillance for dissection with a repeat CT scan at about 6months from original surgery date (1/4/2019).    Thank you for allowing us to participate in this patient's care.    Allergies:   Allergies   Allergen Reactions     Amoxicillin Swelling     Ciprofloxacin Swelling       Medications:   Current Outpatient Medications:      aspirin (ASA) 81 MG EC tablet, Take 81 mg by mouth every 24 hours, Disp: , Rfl:      cholecalciferol (VITAMIN D-1000 MAX ST) 1000 units TABS, Take 1,000 Units by mouth daily , Disp: , Rfl:      metoprolol tartrate (LOPRESSOR) 25 MG tablet, 1 tablet (25 mg) by Per Feeding Tube route 2 times daily, Disp: 60 tablet, Rfl: 0     mirtazapine  "(REMERON) 15 MG tablet, Take 15 mg by mouth, Disp: , Rfl:      torsemide (DEMADEX) 20 MG tablet, Take 20 mg by mouth 2 times daily, Disp: , Rfl: 3     warfarin (COUMADIN) 2 MG tablet, Take 1 tablet (2 mg) by mouth daily, Disp: , Rfl:      Warfarin Therapy Reminder, 1 each continuous prn, Disp: , Rfl:      albuterol (PROVENTIL) (2.5 MG/3ML) 0.083% neb solution, Take 1 vial (2.5 mg) by nebulization every 4 hours as needed for wheezing, Disp: , Rfl:      cephALEXin (KEFLEX) 500 MG capsule, TAKE ONE CAPSULE BY MOUTH TWICE DAILY FOR 7 DAYS, Disp: , Rfl: 0     insulin aspart (NOVOLOG PEN) 100 UNIT/ML pen, Inject 1-6 Units Subcutaneous every 4 hours, Disp: 10.8 mL, Rfl: 0     ipratropium - albuterol 0.5 mg/2.5 mg/3 mL (DUONEB) 0.5-2.5 (3) MG/3ML neb solution, Take 1 vial (3 mLs) by nebulization every 4 hours as needed for wheezing, Disp: , Rfl:      potassium chloride (KLOR-CON) 20 MEQ packet, Take 20 mEq by mouth, Disp: , Rfl:      QUEtiapine (SEROQUEL) 25 MG tablet, 1 tablet (25 mg) by Oral or NG Tube route every 6 hours as needed (agitation, delirium), Disp: , Rfl:      QUEtiapine (SEROQUEL) 25 MG tablet, Take 1 tablet (25 mg) by mouth 2 times daily, Disp: 60 tablet, Rfl: 0    Objective:    Physical Exam:   Blood Pressure 130/76   Pulse 97   Height 1.613 m (5' 3.5\")   Weight 73.4 kg (161 lb 14.4 oz)   Body Mass Index 28.23 kg/m    Physical Exam   Constitutional:   Well nourished, well developed, resting comfortably   Neck:   No appreciable JVD  Cardiovascular: S1, S2, irregular rate and rhythm without murmurs, rubs or gallops  Pulmonary/Chest: Clear to auscultation bilaterally, with no wheezes or retractions. No respiratory distress.  Musculoskeletal:  Mild BLE edema, no clubbing   Skin: Skin is warm and dry. Sternal incision CDI,   Neurological: Alert and oriented to person, place, and time. Nonfocal exam  Psychiatric:  Normal mood and affect.    GRZEGORZ García, CCRN-CSC  Pager: [phone]      "

## 2019-06-19 NOTE — PATIENT INSTRUCTIONS
Follow up with Cardiology and Electrophysiology as scheduled.  Will need a repeat Echocardiogram- Call [phone] to schedule.

## 2019-06-26 ENCOUNTER — HOSPITAL ENCOUNTER (OUTPATIENT)
Dept: CARDIOLOGY | Facility: CLINIC | Age: 79
Discharge: HOME | End: 2019-06-26
Attending: NURSE PRACTITIONER | Admitting: NURSE PRACTITIONER
Payer: COMMERCIAL

## 2019-06-26 DIAGNOSIS — Z98.890 S/P AORTIC DISSECTION REPAIR: ICD-10-CM

## 2019-06-26 PROCEDURE — 93306 TTE W/DOPPLER COMPLETE: CPT | Mod: 26 | Performed by: INTERNAL MEDICINE

## 2019-06-26 PROCEDURE — 93306 TTE W/DOPPLER COMPLETE: CPT

## 2019-07-01 ENCOUNTER — OFFICE VISIT (OUTPATIENT)
Dept: CARDIOLOGY | Facility: CLINIC | Age: 79
End: 2019-07-01
Payer: COMMERCIAL

## 2019-07-01 VITALS
SYSTOLIC BLOOD PRESSURE: 118 MMHG | HEART RATE: 71 BPM | WEIGHT: 164.5 LBS | OXYGEN SATURATION: 98 % | BODY MASS INDEX: 28.09 KG/M2 | HEIGHT: 64 IN | DIASTOLIC BLOOD PRESSURE: 74 MMHG

## 2019-07-01 DIAGNOSIS — I50.32 CHRONIC DIASTOLIC CONGESTIVE HEART FAILURE (H): Primary | ICD-10-CM

## 2019-07-01 DIAGNOSIS — I71.010 ASCENDING AORTIC DISSECTION (H): ICD-10-CM

## 2019-07-01 DIAGNOSIS — Z95.0 CARDIAC PACEMAKER IN SITU: ICD-10-CM

## 2019-07-01 DIAGNOSIS — I89.0 LYMPHEDEMA OF BOTH LOWER EXTREMITIES: ICD-10-CM

## 2019-07-01 DIAGNOSIS — Z79.01 CHRONIC ANTICOAGULATION: ICD-10-CM

## 2019-07-01 DIAGNOSIS — I48.19 PERSISTENT ATRIAL FIBRILLATION (H): ICD-10-CM

## 2019-07-01 PROCEDURE — 99205 OFFICE O/P NEW HI 60 MIN: CPT | Performed by: INTERNAL MEDICINE

## 2019-07-01 RX ORDER — LANOLIN ALCOHOL/MO/W.PET/CERES
1000 CREAM (GRAM) TOPICAL DAILY
COMMUNITY

## 2019-07-01 RX ORDER — ESCITALOPRAM OXALATE 10 MG/1
10 TABLET ORAL DAILY
COMMUNITY
End: 2021-03-22

## 2019-07-01 ASSESSMENT — MIFFLIN-ST. JEOR: SCORE: 1213.23

## 2019-07-01 NOTE — PATIENT INSTRUCTIONS
Take torsemide twice daily until weight goes down 5 pounds.  Weigh yourself daily and write weights down and brig to clinic  Come back for CORE clinic (heart failure clinic) in one week

## 2019-07-01 NOTE — LETTER
7/1/2019    Alecia Millan MD, MD  [address]  [facility] [address]    RE: Priya Funez       Dear Colleague,    I had the pleasure of seeing Priya Funez in the [facility].    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    I reviewed extensive records getting back to her presentation in January 2019 with a dissecting aneurysm.  There was prolonged ischemia to the SMA and right renal regions at that time.  She required a tracheostomy and long-term TCU recovery.  She is now been home from that TCU for about 3 weeks.  She is doing home rehab.  Her tracheostomy has been closed and is healed well.    Since her event she has had problems with worsening and difficult to control peripheral edema.  She has some limiting dyspnea on exertion but it is difficult to sort that out from deconditioning.  She has a bit of orthopnea and uses several pillows but no real PND.  It is unclear what her dry weights are as she was quite sick and intubated for a long time with her tracheostomy.  By report she lost almost 9 pounds after increasing her torsemide earlier this month.  However they went out this weekend and ate a lot and actually her weight is up 5 pounds compared to 2 weeks ago.  She has tried to use pressure stockings but cannot really get them on and is trying to find some open toed ones.  She still uses a walker to get around, where as preoperatively she was out mowing the yard for long periods of time and doing lots of gardening and similar activities.    She is on chronic anticoagulation followed with Dr. Millan.  Outside records show INRs have been difficult to get therapeutic and had not been so the last  few weeks although dose was increased recently and she is getting it checked this afternoon.  She denies any focal neurologic or other thrombo-embolic symptoms or episodes.    Follow-up CT scan in April showed persistent dissection extending into the brachiocephalic and left carotids and down into the abdomen and into beyond the aortic bifurcation.  There was improved perfusion of the right kidney and the SMA.    They are aware to some extent of sodium restriction but does not seem to be a top priority or success at this yet.    Exam is detailed below.  In summary:  Blood pressure 118/74.  Weight on our scales 164 pounds.  Lungs-bibasilar crackles  Cardiac-JVD of 10 cm with HJR, distant heart sounds without murmur gallop or heave  Chest-well-healed sternotomy incision with stable chest  Extremities-2-3+ lymphedema as well as pitting edema most of the way pretibially bilaterally.  Chronic stasis changes without ulceration or cellulitis    Recent echocardiogram from June 2019 reviewed, and is summarized major findings here:    The left ventricle is normal in size.  Left ventricular systolic function is normal.  The visual ejection fraction is estimated at 60-65%.  Normal left ventricular wall motion  The right ventricle is mild to moderately dilated.  Mildly decreased right ventricular systolic function  There is mild (1+) mitral regurgitation.  There is moderate (2+) tricuspid regurgitation.  IVC diameter and respiratory changes fall into an intermediate range  suggesting an RA pressure of 8 mmHg.  Right ventricular systolic pressure is elevated, consistent with mild  pulmonary hypertension.     Since the previous study, the RV now appears larger with mildly decreased  function. TR has increased.    Impression/plan    1-status post repair of ascending aortic dissection which appears to be stable.  Planned by CV surgery to have follow-up CT scan of the aorta sometime in July and I will expedite this.  However  currently stable and blood pressure controlled.  On beta-blocker.    2-diastolic heart failure, chronic.  Right and left volume overload and heart failure findings.  Exam suggests probably at least 10 pounds of volume overload needs to be diuresed.  We discussed potential options.  She got good relief earlier with increasing her torsemide to twice daily so I will have her do that until she has diuresis of 5 pounds.  I will have her back in core clinic a week to assess her response.  Would then consider adding spironolactone depending on how she is responding.    3-bilateral lower extremity lymphedema.  I do not think this is going to resolve just with diuretics.  I have recommended  referral to lymphedema clinic and she is interested in this.    4-chronic atrial fibrillation status post AV node ablation.  Recent pacemaker interrogation shows normal function.  On anticoagulation and followed by Dr. Millan.    5-status post pacemaker placement with normal function on most recent interrogation.  She will follow in device clinic.    We will proceed as above with follow-up in core clinic in a week.      Orders Placed This Encounter   Procedures     CT Chest Abdomen Pelvis w/o & w Contrast     Basic metabolic panel     Follow-Up with CORE Clinic - [facility] visit     LYMPHEDEMA THERAPY REFERRAL       Orders Placed This Encounter   Medications     escitalopram (LEXAPRO) 10 MG tablet     Sig: Take 10 mg by mouth daily     cyanocobalamin (VITAMIN B-12) 100 MCG tablet     Sig: Take 2,000 mcg by mouth daily       Medications Discontinued During This Encounter   Medication Reason     cephALEXin (KEFLEX) 500 MG capsule Therapy completed     pantoprazole (PROTONIX) 2 mg/mL SUSP suspension Therapy completed     sulfamethoxazole-trimethoprim (BACTRIM DS) 800-160 MG tablet Medication Reconciliation Clean Up     QUEtiapine (SEROQUEL) 25 MG tablet Medication Reconciliation Clean Up     QUEtiapine (SEROQUEL) 25 MG tablet Medication  Reconciliation Clean Up     dextrose 50 % injection Therapy completed     glucagon 1 MG SOLR injection Therapy completed     glucose 40 % (400 mg/mL) GEL gel Therapy completed     hydrogen peroxide 3 % solution Therapy completed     insulin aspart (NOVOLOG PEN) 100 UNIT/ML pen Therapy completed         Encounter Diagnoses   Name Primary?     Chronic diastolic congestive heart failure (H) Yes     Ascending aortic dissection (H)      Persistent atrial fibrillation (H)      Cardiac pacemaker in situ      Chronic anticoagulation      Lymphedema of both lower extremities        CURRENT MEDICATIONS:  Current Outpatient Medications   Medication Sig Dispense Refill     acetaminophen (TYLENOL) 325 MG tablet Take 2 tablets (650 mg) by mouth every 4 hours as needed for other (multimodal surgical pain management along with NSAIDS and opioid medication as indicated based on pain control and physical function.)       aspirin (ASA) 81 MG EC tablet Take 81 mg by mouth every 24 hours       cholecalciferol (VITAMIN D-1000 MAX ST) 1000 units TABS Take 1,000 Units by mouth daily        cyanocobalamin (VITAMIN B-12) 100 MCG tablet Take 2,000 mcg by mouth daily       escitalopram (LEXAPRO) 10 MG tablet Take 10 mg by mouth daily       metoprolol tartrate (LOPRESSOR) 25 MG tablet 1 tablet (25 mg) by Per Feeding Tube route 2 times daily 60 tablet 0     mirtazapine (REMERON) 15 MG tablet Take 15 mg by mouth       potassium chloride (KLOR-CON) 20 MEQ packet Take 20 mEq by mouth       torsemide (DEMADEX) 20 MG tablet Take 20 mg by mouth daily   3     warfarin (COUMADIN) 2 MG tablet Take 1 tablet (2 mg) by mouth daily (Patient taking differently: Take 3 mg by mouth daily )       Warfarin Therapy Reminder 1 each continuous prn       albuterol (PROVENTIL) (2.5 MG/3ML) 0.083% neb solution Take 1 vial (2.5 mg) by nebulization every 4 hours as needed for wheezing (Patient not taking: Reported on 7/1/2019)       ipratropium - albuterol 0.5 mg/2.5  mg/3 mL (DUONEB) 0.5-2.5 (3) MG/3ML neb solution Take 1 vial (3 mLs) by nebulization every 4 hours as needed for wheezing (Patient not taking: Reported on 7/1/2019)         ALLERGIES     Allergies   Allergen Reactions     Amoxicillin Swelling     Ciprofloxacin Swelling       PAST MEDICAL HISTORY:  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, s/p Replacement of the ascending aorta with a 34 mm Hemashield branch graft 1/4/19     Hypertension      Persistent atrial fibrillation (H)     post-op 1/2019: AV node ablation and single chamber pacemaker implanted 1/11/2019     Pleural effusion, bilateral     s/p left thoracentesis of 150cc 1/11/19;  right thoracentesis of 400cc 1/24/2019       PAST SURGICAL HISTORY:  Past Surgical History:   Procedure Laterality Date     ANGIOGRAM Right 1/4/2019    Procedure: DIAGONSTIC ANGIOGRAM OF SMA;  Surgeon: Rigo Jennings MD;  Location:  OR     BYPASS GRAFT ARTERY CORONARY, REPAIR VALVE AORTIC, COMBINED N/A 1/4/2019    Procedure: AORTIC DISSECTION REPAIR WITH GRAFT- HEMASHIELD PLATINUM WOVEN DOUBLE VELOR 4 SIDE ARM VASCULAR GRAFT, D:35 X 12 X 10 X 10MM/ L:50CM;  Surgeon: Jose Winslow MD;  Location:  OR     EP ABLATION AV NODE N/A 1/11/2019    Procedure: EP Ablation AV Node;  Surgeon: Tsering Davey MD;  Location:  HEART CARDIAC CATH LAB     EP PACEMAKER Left 1/11/2019    Procedure: EP Pacemaker;  Surgeon: Tsering Davey MD;  Location:  HEART CARDI[facility] CATH LAB     TRACHEOSTOMY N/A 1/25/2019    Procedure: TRACHEOSTOMY  (DR MCCLELLAND);  Surgeon: Bryson Mcclelland MD;  Location:  OR       FAMILY HISTORY:  History reviewed. No pertinent family history.    SOCIAL HISTORY:  Social History     Socioeconomic History     Marital status:      Spouse name: None     Number of children: None     Years of education: None     Highest education level: None   Occupational History     None   Social Needs     Financial  "resource strain: None     Food insecurity:     Worry: None     Inability: None     Transportation needs:     Medical: None     Non-medical: None   Tobacco Use     Smoking status: Never Smoker     Smokeless tobacco: Never Used     Tobacco comment: hisband   Substance and Sexual Activity     Alcohol use: No     Frequency: Never     Drug use: No     Sexual activity: None   Lifestyle     Physical activity:     Days per week: None     Minutes per session: None     Stress: None   Relationships     Social connections:     Talks on phone: None     Gets together: None     Attends [employer] service: None     Active member of club or organization: None     Attends meetings of clubs or organizations: None     Relationship status: None     Intimate partner violence:     Fear of current or ex partner: None     Emotionally abused: None     Physically abused: None     Forced sexual activity: None   Other Topics Concern     None   Social History Narrative     None       Review of Systems:  Skin:  Negative       Eyes:  Negative      ENT:  Negative      Respiratory:  Positive for shortness of breath;dyspnea on exertion;dyspnea at rest     Cardiovascular:  Negative edema;Positive for    Gastroenterology: Negative      Genitourinary:  Negative      Musculoskeletal:  Positive for arthritis;joint pain    Neurologic:  Negative      Psychiatric:  Positive for anxiety;depression    Heme/Lymph/Imm:  Negative      Endocrine:  Negative        Physical Exam:  Vitals: /74   Pulse 71   Ht 1.613 m (5' 3.5\")   Wt 74.6 kg (164 lb 8 oz)   SpO2 98%   BMI 28.68 kg/m       Constitutional:  cooperative, alert and oriented, well developed, well nourished, in no acute distress;in no acute distress        Skin:  warm and dry to the touch, no apparent skin lesions or masses noted surgical scars well-healed pacemaker incision in the left infraclavicular area was well-healed      Head:  normocephalic, no masses or lesions        Eyes:  pupils equal " and round;sclera white;EOMS intact        Lymph:      ENT:  no pallor or cyanosis        Neck:  carotid pulses are full and equal bilaterally JVP 10-12;hepatojugular reflux      Respiratory:  normal respiratory excursion;healed median sternotomy scar basal rales       Cardiac: regular rhythm;no murmurs, gallops or rubs detected   distant heart sounds            not assessed this visit                                        GI:  abdomen soft;BS normoactive;no HSM;non-tender;no bruits        Extremities and Muscular Skeletal:    stasis pigmentation bilateral LE edema;2+     bilateral lymphedema    Neurological:  no gross motor deficits        Psych:  affect appropriate, oriented to time, person and place          Thank you for allowing me to participate in the care of your patient.    Sincerely,     Benjamin Morales MD     [facility]

## 2019-07-01 NOTE — PROGRESS NOTES
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    I reviewed extensive records getting back to her presentation in January 2019 with a dissecting aneurysm.  There was prolonged ischemia to the SMA and right renal regions at that time.  She required a tracheostomy and long-term TCU recovery.  She is now been home from that TCU for about 3 weeks.  She is doing home rehab.  Her tracheostomy has been closed and is healed well.    Since her event she has had problems with worsening and difficult to control peripheral edema.  She has some limiting dyspnea on exertion but it is difficult to sort that out from deconditioning.  She has a bit of orthopnea and uses several pillows but no real PND.  It is unclear what her dry weights are as she was quite sick and intubated for a long time with her tracheostomy.  By report she lost almost 9 pounds after increasing her torsemide earlier this month.  However they went out this weekend and ate a lot and actually her weight is up 5 pounds compared to 2 weeks ago.  She has tried to use pressure stockings but cannot really get them on and is trying to find some open toed ones.  She still uses a walker to get around, where as preoperatively she was out mowing the yard for long periods of time and doing lots of gardening and similar activities.    She is on chronic anticoagulation followed with Dr. Millan.  Outside records show INRs have been difficult to get therapeutic and had not been so the last few weeks although dose was increased recently and she is getting it checked this afternoon.  She denies any focal neurologic or other thrombo-embolic symptoms or episodes.    Follow-up CT scan in April showed persistent dissection  extending into the brachiocephalic and left carotids and down into the abdomen and into beyond the aortic bifurcation.  There was improved perfusion of the right kidney and the SMA.    They are aware to some extent of sodium restriction but does not seem to be a top priority or success at this yet.    Exam is detailed below.  In summary:  Blood pressure 118/74.  Weight on our scales 164 pounds.  Lungs-bibasilar crackles  Cardiac-JVD of 10 cm with HJR, distant heart sounds without murmur gallop or heave  Chest-well-healed sternotomy incision with stable chest  Extremities-2-3+ lymphedema as well as pitting edema most of the way pretibially bilaterally.  Chronic stasis changes without ulceration or cellulitis    Recent echocardiogram from June 2019 reviewed, and is summarized major findings here:    The left ventricle is normal in size.  Left ventricular systolic function is normal.  The visual ejection fraction is estimated at 60-65%.  Normal left ventricular wall motion  The right ventricle is mild to moderately dilated.  Mildly decreased right ventricular systolic function  There is mild (1+) mitral regurgitation.  There is moderate (2+) tricuspid regurgitation.  IVC diameter and respiratory changes fall into an intermediate range  suggesting an RA pressure of 8 mmHg.  Right ventricular systolic pressure is elevated, consistent with mild  pulmonary hypertension.     Since the previous study, the RV now appears larger with mildly decreased  function. TR has increased.    Impression/plan    1-status post repair of ascending aortic dissection which appears to be stable.  Planned by CV surgery to have follow-up CT scan of the aorta sometime in July and I will expedite this.  However currently stable and blood pressure controlled.  On beta-blocker.    2-diastolic heart failure, chronic.  Right and left volume overload and heart failure findings.  Exam suggests probably at least 10 pounds of volume overload needs to be  diuresed.  We discussed potential options.  She got good relief earlier with increasing her torsemide to twice daily so I will have her do that until she has diuresis of 5 pounds.  I will have her back in core clinic a week to assess her response.  Would then consider adding spironolactone depending on how she is responding.    3-bilateral lower extremity lymphedema.  I do not think this is going to resolve just with diuretics.  I have recommended  referral to lymphedema clinic and she is interested in this.    4-chronic atrial fibrillation status post AV node ablation.  Recent pacemaker interrogation shows normal function.  On anticoagulation and followed by Dr. Millan.    5-status post pacemaker placement with normal function on most recent interrogation.  She will follow in device clinic.    We will proceed as above with follow-up in core clinic in a week.      Orders Placed This Encounter   Procedures     CT Chest Abdomen Pelvis w/o & w Contrast     Basic metabolic panel     Follow-Up with [facility] Clinic - RICH visit     LYMPHEDEMA THERAPY REFERRAL       Orders Placed This Encounter   Medications     escitalopram (LEXAPRO) 10 MG tablet     Sig: Take 10 mg by mouth daily     cyanocobalamin (VITAMIN B-12) 100 MCG tablet     Sig: Take 2,000 mcg by mouth daily       Medications Discontinued During This Encounter   Medication Reason     cephALEXin (KEFLEX) 500 MG capsule Therapy completed     pantoprazole (PROTONIX) 2 mg/mL SUSP suspension Therapy completed     sulfamethoxazole-trimethoprim (BACTRIM DS) 800-160 MG tablet Medication Reconciliation Clean Up     QUEtiapine (SEROQUEL) 25 MG tablet Medication Reconciliation Clean Up     QUEtiapine (SEROQUEL) 25 MG tablet Medication Reconciliation Clean Up     dextrose 50 % injection Therapy completed     glucagon 1 MG SOLR injection Therapy completed     glucose 40 % (400 mg/mL) GEL gel Therapy completed     hydrogen peroxide 3 % solution Therapy completed     insulin aspart  (NOVOLOG PEN) 100 UNIT/ML pen Therapy completed         Encounter Diagnoses   Name Primary?     Chronic diastolic congestive heart failure (H) Yes     Ascending aortic dissection (H)      Persistent atrial fibrillation (H)      Cardiac pacemaker in situ      Chronic anticoagulation      Lymphedema of both lower extremities        CURRENT MEDICATIONS:  Current Outpatient Medications   Medication Sig Dispense Refill     acetaminophen (TYLENOL) 325 MG tablet Take 2 tablets (650 mg) by mouth every 4 hours as needed for other (multimodal surgical pain management along with NSAIDS and opioid medication as indicated based on pain control and physical function.)       aspirin (ASA) 81 MG EC tablet Take 81 mg by mouth every 24 hours       cholecalciferol (VITAMIN D-1000 MAX ST) 1000 units TABS Take 1,000 Units by mouth daily        cyanocobalamin (VITAMIN B-12) 100 MCG tablet Take 2,000 mcg by mouth daily       escitalopram (LEXAPRO) 10 MG tablet Take 10 mg by mouth daily       metoprolol tartrate (LOPRESSOR) 25 MG tablet 1 tablet (25 mg) by Per Feeding Tube route 2 times daily 60 tablet 0     mirtazapine (REMERON) 15 MG tablet Take 15 mg by mouth       potassium chloride (KLOR-CON) 20 MEQ packet Take 20 mEq by mouth       torsemide (DEMADEX) 20 MG tablet Take 20 mg by mouth daily   3     warfarin (COUMADIN) 2 MG tablet Take 1 tablet (2 mg) by mouth daily (Patient taking differently: Take 3 mg by mouth daily )       Warfarin Therapy Reminder 1 each continuous prn       albuterol (PROVENTIL) (2.5 MG/3ML) 0.083% neb solution Take 1 vial (2.5 mg) by nebulization every 4 hours as needed for wheezing (Patient not taking: Reported on 7/1/2019)       ipratropium - albuterol 0.5 mg/2.5 mg/3 mL (DUONEB) 0.5-2.5 (3) MG/3ML neb solution Take 1 vial (3 mLs) by nebulization every 4 hours as needed for wheezing (Patient not taking: Reported on 7/1/2019)         ALLERGIES     Allergies   Allergen Reactions     Amoxicillin Swelling      Ciprofloxacin Swelling       PAST MEDICAL HISTORY:  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, s/p Replacement of the ascending aorta with a 34 mm Hemashield branch graft 1/4/19     Hypertension      Persistent atrial fibrillation (H)     post-op 1/2019: AV node ablation and single chamber pacemaker implanted 1/11/2019     Pleural effusion, bilateral     s/p left thoracentesis of 150cc 1/11/19;  right thoracentesis of 400cc 1/24/2019       PAST SURGICAL HISTORY:  Past Surgical History:   Procedure Laterality Date     ANGIOGRAM Right 1/4/2019    Procedure: DIAGONSTIC ANGIOGRAM OF SMA;  Surgeon: Rigo Jennings MD;  Location:  OR     BYPASS GRAFT ARTERY CORONARY, REPAIR VALVE AORTIC, COMBINED N/A 1/4/2019    Procedure: AORTIC DISSECTION REPAIR WITH GRAFT- HEMASHIELD PLATINUM WOVEN DOUBLE VELOR 4 SIDE ARM VASCULAR GRAFT, D:35 X 12 X 10 X 10MM/ L:50CM;  Surgeon: Jose Winslow MD;  Location:  OR     EP ABLATION AV NODE N/A 1/11/2019    Procedure: EP Ablation AV Node;  Surgeon: Tsering Davey MD;  Location:  HEART CARDIAC CATH LAB     EP PACEMAKER Left 1/11/2019    Procedure: EP Pacemaker;  Surgeon: Tsering Davey MD;  Location:  HEART CARDIAC CATH LAB     TRACHEOSTOMY N/A 1/25/2019    Procedure: TRACHEOSTOMY  (DR MCCLELLAND);  Surgeon: Bryson Mcclelland MD;  Location:  OR       FAMILY HISTORY:  History reviewed. No pertinent family history.    SOCIAL HISTORY:  Social History     Socioeconomic History     Marital status:      Spouse name: None     Number of children: None     Years of education: None     Highest education level: None   Occupational History     None   Social Needs     Financial resource strain: None     Food insecurity:     Worry: None     Inability: None     Transportation needs:     Medical: None     Non-medical: None   Tobacco Use     Smoking status: Never Smoker     Smokeless tobacco: Never Used     Tobacco comment:  "hisband   Substance and Sexual Activity     Alcohol use: No     Frequency: Never     Drug use: No     Sexual activity: None   Lifestyle     Physical activity:     Days per week: None     Minutes per session: None     Stress: None   Relationships     Social connections:     Talks on phone: None     Gets together: None     Attends Mu-ism service: None     Active member of club or organization: None     Attends meetings of clubs or organizations: None     Relationship status: None     Intimate partner violence:     Fear of current or ex partner: None     Emotionally abused: None     Physically abused: None     Forced sexual activity: None   Other Topics Concern     None   Social History Narrative     None       Review of Systems:  Skin:  Negative       Eyes:  Negative      ENT:  Negative      Respiratory:  Positive for shortness of breath;dyspnea on exertion;dyspnea at rest     Cardiovascular:  Negative edema;Positive for    Gastroenterology: Negative      Genitourinary:  Negative      Musculoskeletal:  Positive for arthritis;joint pain    Neurologic:  Negative      Psychiatric:  Positive for anxiety;depression    Heme/Lymph/Imm:  Negative      Endocrine:  Negative        Physical Exam:  Vitals: /74   Pulse 71   Ht 1.613 m (5' 3.5\")   Wt 74.6 kg (164 lb 8 oz)   SpO2 98%   BMI 28.68 kg/m      Constitutional:  cooperative, alert and oriented, well developed, well nourished, in no acute distress;in no acute distress        Skin:  warm and dry to the touch, no apparent skin lesions or masses noted surgical scars well-healed pacemaker incision in the left infraclavicular area was well-healed      Head:  normocephalic, no masses or lesions        Eyes:  pupils equal and round;sclera white;EOMS intact        Lymph:      ENT:  no pallor or cyanosis        Neck:  carotid pulses are full and equal bilaterally JVP 10-12;hepatojugular reflux      Respiratory:  normal respiratory excursion;healed median sternotomy " scar basal rales       Cardiac: regular rhythm;no murmurs, gallops or rubs detected   distant heart sounds            not assessed this visit                                        GI:  abdomen soft;BS normoactive;no HSM;non-tender;no bruits        Extremities and Muscular Skeletal:    stasis pigmentation bilateral LE edema;2+     bilateral lymphedema    Neurological:  no gross motor deficits        Psych:  affect appropriate, oriented to time, person and place        CC  Alecia Millan MD  [facility]  [address]  [place] [address]

## 2019-07-09 ENCOUNTER — OFFICE VISIT (OUTPATIENT)
Dept: CARDIOLOGY | Facility: CLINIC | Age: 79
End: 2019-07-09
Attending: INTERNAL MEDICINE
Payer: COMMERCIAL

## 2019-07-09 VITALS
BODY MASS INDEX: 26.63 KG/M2 | HEIGHT: 64 IN | HEART RATE: 90 BPM | WEIGHT: 156 LBS | DIASTOLIC BLOOD PRESSURE: 68 MMHG | SYSTOLIC BLOOD PRESSURE: 106 MMHG | OXYGEN SATURATION: 95 %

## 2019-07-09 VITALS
SYSTOLIC BLOOD PRESSURE: 123 MMHG | WEIGHT: 156 LBS | BODY MASS INDEX: 26.63 KG/M2 | HEIGHT: 64 IN | DIASTOLIC BLOOD PRESSURE: 74 MMHG | HEART RATE: 89 BPM

## 2019-07-09 DIAGNOSIS — I10 BENIGN ESSENTIAL HYPERTENSION: ICD-10-CM

## 2019-07-09 DIAGNOSIS — Z95.0 CARDIAC PACEMAKER IN SITU: Primary | ICD-10-CM

## 2019-07-09 DIAGNOSIS — Z95.0 PACEMAKER: ICD-10-CM

## 2019-07-09 DIAGNOSIS — I49.5 SSS (SICK SINUS SYNDROME) (H): ICD-10-CM

## 2019-07-09 DIAGNOSIS — I50.32 CHRONIC DIASTOLIC CONGESTIVE HEART FAILURE (H): ICD-10-CM

## 2019-07-09 DIAGNOSIS — I48.19 PERSISTENT ATRIAL FIBRILLATION (H): ICD-10-CM

## 2019-07-09 DIAGNOSIS — I48.19 PERSISTENT ATRIAL FIBRILLATION (H): Primary | ICD-10-CM

## 2019-07-09 DIAGNOSIS — I71.010 ASCENDING AORTIC DISSECTION (H): Primary | ICD-10-CM

## 2019-07-09 LAB
ANION GAP SERPL CALCULATED.3IONS-SCNC: 11.8 MMOL/L (ref 6–17)
BUN SERPL-MCNC: 21 MG/DL (ref 7–30)
CALCIUM SERPL-MCNC: 10 MG/DL (ref 8.5–10.5)
CHLORIDE SERPL-SCNC: 100 MMOL/L (ref 98–107)
CO2 SERPL-SCNC: 30 MMOL/L (ref 23–29)
CREAT SERPL-MCNC: 1.49 MG/DL (ref 0.7–1.3)
GFR SERPL CREATININE-BSD FRML MDRD: 34 ML/MIN/{1.73_M2}
GLUCOSE SERPL-MCNC: 93 MG/DL (ref 70–105)
POTASSIUM SERPL-SCNC: 3.8 MMOL/L (ref 3.5–5.1)
SODIUM SERPL-SCNC: 138 MMOL/L (ref 136–145)

## 2019-07-09 PROCEDURE — 93279 PRGRMG DEV EVAL PM/LDLS PM: CPT | Performed by: INTERNAL MEDICINE

## 2019-07-09 PROCEDURE — 99214 OFFICE O/P EST MOD 30 MIN: CPT | Mod: 25 | Performed by: NURSE PRACTITIONER

## 2019-07-09 PROCEDURE — 99213 OFFICE O/P EST LOW 20 MIN: CPT | Mod: 25 | Performed by: PHYSICIAN ASSISTANT

## 2019-07-09 PROCEDURE — 36415 COLL VENOUS BLD VENIPUNCTURE: CPT | Performed by: INTERNAL MEDICINE

## 2019-07-09 PROCEDURE — 80048 BASIC METABOLIC PNL TOTAL CA: CPT | Performed by: INTERNAL MEDICINE

## 2019-07-09 RX ORDER — METOPROLOL TARTRATE 25 MG/1
25 TABLET, FILM COATED ORAL 2 TIMES DAILY
COMMUNITY
End: 2019-09-20

## 2019-07-09 ASSESSMENT — MIFFLIN-ST. JEOR
SCORE: 1174.67
SCORE: 1174.67

## 2019-07-09 NOTE — PATIENT INSTRUCTIONS
1. Pacer check today looked great. Using bottom wire 100% of the time.     2. F/u with Xochitl in CORE (heart failure) clinic as she suggested    3. Get your routine pacer checks (from home) quarterly and then see us back in 1 year.    4. CALL if issues: [phone]

## 2019-07-09 NOTE — LETTER
7/9/2019    Alecia Millan MD, MD  [facility] [address]  [facility] [address]    RE: Priya Funez       Dear Colleague,    I had the pleasure of seeing Priya Funez in the [facility].    HPI:   I had the pleasure of seeing Priya when she came for follow up of atrial fibrillation.  This 78 year old has seen Dr. Morales for her history of:    1.  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  Prolonged ischemia to the SMA and right renal regions at that time.  Required tracheostomy/long-term TCU recovery, but now back home  2.  Atrial fibrillation on anticoagulation in the setting of acute dissection/emergent repair.  S/p AV node ablation and single-chamber Medtronic pacemaker 1/2019  3.  HFpEF with most recent echocardiogram showing EF 60-65%.  Complaints of significant lower extremity edema and now sent to the lymphedema clinic.  Was established in CORE clinic earlier today (Xochitl Berman NP)  4.  Benign essential hypertension      Lavonne presented to the ER 1/4/2019 complaining of significant jaw pain.  She had significant nausea as well.  In the ED, CT revealed extensive aortic dissection for the ascending thoracic aorta through the proximal aortic bifurcation in the abdomen with occlusion of the right renal artery and SMA.  She underwent emergent replacement of the ascending aorta with a 34 mm Hemashield branch graft.  Aortic valve was resuspended.  Noted to have rapid A. fib which was very difficult to control.  Dr. Yusuf was consulted, who noted that despite metoprolol and the amiodarone, heart rates were difficult to control.  She actually had converted back to sinus rhythm, but had more trouble with atrial fibrillation and underwent AV Node ablation and single chamber Medtronic pacemaker on 1/11/2019.    - From PPM standpoint, doing well. No c/o incisional discmofort    Diagnostic  Testing:    Device interrogation today showed 100%  in VVIR  bpm.  Underlying rhythm was AFib with CHB achieved by AVNA. No junctional escape. Normal sensing, threshold and impedance. No ventricular arrhythmias. LRL decreased to 60 bpm per clinic protocol.    Echocardiogram 6/2019 showed EF 60 - 65%.  RV was mild-moderately dilated with mildly decreased RVSF.  2+ TR, with RVSP mildly elevated at 27+ right atrial pressure.  RA pressure was slightly elevated at 8 mmHg.  Left atrium was mildly dilated @3.5 cm with a volume index of 21.9 mL/m .  1+ MR,    Exercise stress echocardiogram 8/2017 showed no evidence of stress-induced ischemia.    Assessment & Plan:    1. Permanent Atrial Fibrillation    Noted in the setting of acute aortic dissection s/p repair    On AC with warfarin. No bleeding issues    S/p AVN ablation and single chamber Medtronic PPM 1/2019 and device interrogation as above    PLAN:    Continue routine device checks    See me 1 year with annual device interrogation    Continue warfarin      2. Aortic Dissection    S/p repair of dissection    Due for CT aorta 7/2019 (scheduled)    PLAN:    Per Dr. Morales, Aorta Clinic    3. HFpEF    Echo showed normal EF and no significant valvular abnormalities    Saw Xochitl Berman earlier today to establish in CORE clinic     PLAN:    Continue CORE f/u as suggested by Xochitl and Dr. Carmen Goddard PA-C, MSPAS      Orders Placed This Encounter   Procedures     Follow-Up with Cardiac Advanced Practice Provider     EKG 12-lead complete w/read - Clinics     No orders of the defined types were placed in this encounter.    There are no discontinued medications.      Encounter Diagnoses   Name Primary?     Persistent atrial fibrillation (H) Yes     Pacemaker        CURRENT MEDICATIONS:  Current Outpatient Medications   Medication Sig Dispense Refill     acetaminophen (TYLENOL) 325 MG tablet Take 2 tablets (650 mg) by mouth every 4 hours as needed for other  (multimodal surgical pain management along with NSAIDS and opioid medication as indicated based on pain control and physical function.)       aspirin (ASA) 81 MG EC tablet Take 81 mg by mouth every 24 hours       cholecalciferol (VITAMIN D-1000 MAX ST) 1000 units TABS Take 1,000 Units by mouth daily        cyanocobalamin (VITAMIN B-12) 100 MCG tablet Take 2,000 mcg by mouth daily       escitalopram (LEXAPRO) 10 MG tablet Take 10 mg by mouth daily       metoprolol tartrate (LOPRESSOR) 25 MG tablet Take 25 mg by mouth 2 times daily       mirtazapine (REMERON) 15 MG tablet Take 15 mg by mouth       potassium chloride (KLOR-CON) 20 MEQ packet Take 20 mEq by mouth       torsemide (DEMADEX) 20 MG tablet Take 20 mg by mouth daily   3     warfarin (COUMADIN) 2 MG tablet Take 1 tablet (2 mg) by mouth daily (Patient taking differently: Take 3 mg by mouth daily )       Warfarin Therapy Reminder 1 each continuous prn         ALLERGIES     Allergies   Allergen Reactions     Amoxicillin Swelling     Ciprofloxacin Swelling       PAST MEDICAL HISTORY:  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, s/p Replacement of the ascending aorta with a 34 mm Hemashield branch graft 1/4/19     Hypertension      Persistent atrial fibrillation (H)     post-op 1/2019: AV node ablation and single chamber pacemaker implanted 1/11/2019     Pleural effusion, bilateral     s/p left thoracentesis of 150cc 1/11/19;  right thoracentesis of 400cc 1/24/2019       PAST SURGICAL HISTORY:  Past Surgical History:   Procedure Laterality Date     ANGIOGRAM Right 1/4/2019    Procedure: DIAGONSTIC ANGIOGRAM OF SMA;  Surgeon: Rigo Jennings MD;  Location: [facility] OR     BYPASS GRAFT ARTERY CORONARY, REPAIR VALVE AORTIC, COMBINED N/A 1/4/2019    Procedure: AORTIC DISSECTION REPAIR WITH GRAFT- HEMASHIELD PLATINUM WOVEN DOUBLE VELOR 4 SIDE ARM VASCULAR GRAFT, D:35 X 12 X 10 X 10MM/ L:50CM;  Surgeon: Jose Winslow  MD Ancelmo;  Location:  OR     EP ABLATION AV NODE N/A 1/11/2019    Procedure: EP Ablation AV Node;  Surgeon: Tsering Davey MD;  Location:  HEART CARDIAC CATH LAB     EP PACEMAKER Left 1/11/2019    Procedure: EP Pacemaker;  Surgeon: Tsering Davey MD;  Location:  HEART CARDIAC CATH LAB     TRACHEOSTOMY N/A 1/25/2019    Procedure: TRACHEOSTOMY  (DR MCCLELLAND);  Surgeon: Bryson Mcclelland MD;  Location:  OR       FAMILY HISTORY:  No family history on file.    SOCIAL HISTORY:  Social History     Socioeconomic History     Marital status:      Spouse name: None     Number of children: None     Years of education: None     Highest education level: None   Occupational History     None   Social Needs     Financial resource strain: None     Food insecurity:     Worry: None     Inability: None     Transportation needs:     Medical: None     Non-medical: None   Tobacco Use     Smoking status: Never Smoker     Smokeless tobacco: Never Used     Tobacco comment: hisband   Substance and Sexual Activity     Alcohol use: No     Frequency: Never     Comment: no alcohol 7/9/19     Drug use: No     Sexual activity: None   Lifestyle     Physical activity:     Days per week: None     Minutes per session: None     Stress: None   Relationships     Social connections:     Talks on phone: None     Gets together: None     Attends Scientology service: None     Active member of club or organization: None     Attends meetings of clubs or organizations: None     Relationship status: None     Intimate partner violence:     Fear of current or ex partner: None     Emotionally abused: None     Physically abused: None     Forced sexual activity: None   Other Topics Concern     Parent/sibling w/ CABG, MI or angioplasty before 65F 55M? Not Asked   Social History Narrative     None       Review of Systems:  Skin:  Negative     Eyes:  Negative    ENT:  Negative    Respiratory:  Positive for dyspnea on exertion  Cardiovascular:  Negative  "for;palpitations;chest pain edema;Positive for;exercise intolerance;fatigue  Gastroenterology: Negative for melena;hematochezia  Genitourinary:  Negative    Musculoskeletal:  Positive for arthritis;joint pain  Neurologic:  Negative    Psychiatric:  Positive for anxiety;depression  Heme/Lymph/Imm:  Negative    Endocrine:  Negative      Physical Exam:  Vitals: /74   Pulse 89   Ht 1.613 m (5' 3.5\")   Wt 70.8 kg (156 lb)   BMI 27.20 kg/m       Constitutional:  in no acute distress        Skin:  not assessed this visit        Head:  not assessed this visit        Eyes:  not assessed this visit        ENT:  not assessed this visit        Neck:  not assessed this visit        Chest:  healed median sternotomy scar;normal respiratory excursion        Cardiac: regular rhythm;no murmurs, gallops or rubs detected   distant heart sounds              Abdomen:  not assessed this visit        Vascular: not assessed this visit                                      Extremities and Back:  not assessed this visit        Neurological:  no gross motor deficits          Recent Lab Results:  LIPID RESULTS:  Lab Results   Component Value Date    TRIG 96 01/28/2019       LIVER ENZYME RESULTS:  Lab Results   Component Value Date    AST 27 04/04/2019    ALT 19 04/04/2019       CBC RESULTS:  Lab Results   Component Value Date    WBC 6.7 04/04/2019    RBC 3.70 (L) 04/04/2019    HGB 9.1 (L) 04/04/2019    HCT 31.7 (L) 04/04/2019    MCV 86 04/04/2019    MCH 24.6 (L) 04/04/2019    MCHC 28.7 (L) 04/04/2019    RDW 17.5 (H) 04/04/2019     04/04/2019       BMP RESULTS:  Lab Results   Component Value Date     07/09/2019    POTASSIUM 3.8 07/09/2019    CHLORIDE 100 07/09/2019    CO2 30 (H) 07/09/2019    ANIONGAP 11.8 07/09/2019    GLC 93 07/09/2019    BUN 21 07/09/2019    CR 1.49 (H) 07/09/2019    GFRESTIMATED 34 (L) 07/09/2019    GFRESTBLACK 41 (L) 07/09/2019    BESS 10.0 07/09/2019        A1C RESULTS:  Lab Results   Component Value " Date    A1C 5.3 01/04/2019       INR RESULTS:  Lab Results   Component Value Date    INR 2.66 (H) 04/04/2019    INR 2.32 (H) 01/29/2019           CC  Alecia Millan MD  [facility] [address] [place], [place] [id number]                    Thank you for allowing me to participate in the care of your patient.      Sincerely,     Grace Goddard PA-C     [facility]    cc:   Alecia Millan MD  [facility] [address] [place] [phone]

## 2019-07-09 NOTE — PATIENT INSTRUCTIONS
Call CORE nurse for any questions or concerns Mon-Fri 8am-4pm:                [phone]             For concerns after hours:                  [phone]     Medication changes: Decrease torsemide to 20mg daily (1 tablet daily)  Plan from today: Follow up with Xochitl on 7/19/19, labs prior  Lab results: see attached;  Component      Latest Ref Rng & Units 4/4/2019 7/9/2019   Sodium      136 - 145 mmol/L 132 (L) 138   Potassium      3.5 - 5.1 mmol/L 4.1 3.8   Chloride      98 - 107 mmol/L 100 100   Carbon Dioxide      23 - 29 mmol/L 24 30 (H)   Anion Gap      6 - 17 mmol/L 8 11.8   Glucose      70 - 105 mg/dL 79 93   Urea Nitrogen      7 - 30 mg/dL 22 21   Creatinine      0.70 - 1.30 mg/dL 0.97 1.49 (H)   GFR Estimate      >60 mL/min/1.73:m2 57 (L) 34 (L)   GFR Estimate If Black      >60 mL/min/1.73:m2 66 41 (L)   Calcium      8.5 - 10.5 mg/dL 9.0 10.0   Bilirubin Total      0.2 - 1.3 mg/dL 1.0    Albumin      3.4 - 5.0 g/dL 3.2 (L)    Protein Total      6.8 - 8.8 g/dL 7.6    Alkaline Phosphatase      40 - 150 U/L 104    ALT      0 - 50 U/L 19    AST      0 - 45 U/L 27

## 2019-07-09 NOTE — PROGRESS NOTES
Cardiology Clinic Progress Note  Priya Funez MRN# [id number]   YOB: 1940 Age: 78 year old   Primary Cardiologist: Dr. Morales Reason for visit: CORE enrollment            Assessment and Plan:   Priya Funez is a very pleasant 78 year old female with a history of HFpEF, atrial fibrillation s/p AV leo ablation with PPM implantation 1/11/2019, emergent aortic dissection repair 1/4/19, hypertension and obesity. Patient here today for CORE enrollment.       1.  Chronic diastolic heart failure/HFpEF :  LVEF 60-65%, LV normal size/function, RV mild to moderately dilated, RV systolic function mildly decreased. Patient has been monitoring weight at home but unable to recall readings. Notes she didn't take torsemide 20mg BID every day, only 3-4 days since visit with Dr. Morales last week. Weight is down 8# since 7/1/19, patient noting improvement in symptoms. Labs today show worsening kidney function, creatinine at baseline 0.8-0.9 -> 1.49 today. BUN 21. Electrolytes stable. ? Overdiuresis. Will decrease torsemide back to 20mg daily and have patient follow up closely in CORE clinic.    - NYHA class III, stage C   - Fluid status : close to euvolemic   - Diuretic regimen : DECREASE torsemide 20mg daily   - Aldosterone antagonist : will consider in future if kidney function stable.    - Blood pressure : controlled   - HF education provided today, given written education materials, extensively reviewed low sodium diet and when to notify CORE clinic.    - Lymphedema clinic appt scheduled for 7/18/19   - Reviewed consideration for WEL program, which she states she will consider.     2. Chronic atrial fibrillation s/p AV node ablation 1/2019 - stable, asymptomatic.    - Continue routine follow up with EP and device clinic   - [id number] score 3 (age, female, HF)   - Continue warfarin for thromboembolic prophylaxis    3. Emergent aortic dissection repair 1/4/19   - CV surgery recommends repeat CT 6 months  after surgery for surveillance - scheduled for tomorrow.     4. Hypertension - controlled, continue current medications    5. Obesity - counseled patient on weight loss strategies.    6. Mild mitral regurgitation    7. Moderate tricuspid regurgitation      Changes today: DECREASE torsemide to 20mg daily     Follow up plan:     CORE follow up 7/19/19.     Lymphedema evaluation 7/18/19          History of Presenting Illness:    Priya Funez is a very pleasant 78 year old female with a history of HFpEF, atrial fibrillation s/p AV leo ablation with PPM implantation 1/11/2019, emergent aortic dissection repair 1/4/19, hypertension and obesity.     Patient presented in January with dissecting aortic aneurysm, she was hospitalized from 1/4/19-1/29/19. Patient had a complicated hospital course. There was prolonged ischemia to the SMA and right renal regions. She required tracheostomy placement and was discharged to [place] [facility] on ventilatory support. Patients tracheostomy has been discontinued and she transitioned home the beginning of June.     She was most recently seen by Dr. Morales on 7/1/19 at which point patient was noted to be volume up on exam. Recommended increasing her torsemide to 20mg BID. Lymphedema clinic referral placed and CORE consult ordered.    Echocardiogram 6/26/19 showed LVEF 60-65%, LV normal size/function, RV mild to moderately dilated, RV systolic function mildly decreased, mild MR, moderate tricuspid regurgitation     Patient is here today for CORE enrollment.     Patient reports feeling good. Monitoring weights most days at home, unable to recall readings on home scale. Clinic weight today is 156# which is down from 164# on 7/1/19. States she took the 40mg torsemide 3 to 4 of the days since visit with Dr. Morales on 7/1/19, but not every day. Always consistently take torsemide 20mg in the morning. Reports significant improvement in lower extremity edema. Wearing compression stockings. Denies  "abdominal distention/bloating. Denies shortness of breath at rest. Will note exertional dyspnea with activity, \"long walk\". Able to complete ADLS and IADLs independently. Denies PND/orthopnea this past week. Energy levels are good. Patient denies chest pain or chest tightness. Denies dizziness, lightheadedness or other presyncopal symptoms. Denies tachycardia or palpitations.     Labs today show worsening kidney function, creatinine 1.49, BUN 21. Electrolytes stable. Patient brought labs from PCP visit on 6/3/19 which show a creatinine of 0.97 and BUN of 20. Potassium 4.1. Hemoglobin 11.3. Blood pressure 106/68 and HR 90 in clinic today.    Appetite good. Eating 1/2 meals out and 1/2 meals at home. Not adding additional salt to foods. Typically eating at [employer], [employer], [employer]. Enjoys cottage cheese. Enjoys grilling at home. Typically eating a piece of grilled meat and frozen vegetables. Complete home PT/OT. Walking in the mall 2-3 days per week for activity. Denies alcohol use. Denies tobacco use.         Recent Hospitalizations   1/4/19-1/29/19 r/t dissecting aortic aneurysm, underwent emergent repair 1/4/19. Patient had a complicated hospital course. There was prolonged ischemia to the SMA and right renal regions. She required tracheostomy placement and was discharged to [facility] on ventilatory support.         Social History     55 years, 4 children, 8 grandchildren, enjoys her gardens in the summer.   Social History     Socioeconomic History     Marital status:      Spouse name: Not on file     Number of children: Not on file     Years of education: Not on file     Highest education level: Not on file   Occupational History     Not on file   Social Needs     Financial resource strain: Not on file     Food insecurity:     Worry: Not on file     Inability: Not on file     Transportation needs:     Medical: Not on file     Non-medical: Not on file   Tobacco Use     Smoking status: Never Smoker " "    Smokeless tobacco: Never Used     Tobacco comment: hisband   Substance and Sexual Activity     Alcohol use: No     Frequency: Never     Comment: no alcohol 7/9/19     Drug use: No     Sexual activity: Not on file   Lifestyle     Physical activity:     Days per week: Not on file     Minutes per session: Not on file     Stress: Not on file   Relationships     Social connections:     Talks on phone: Not on file     Gets together: Not on file     Attends Mu-ism service: Not on file     Active member of club or organization: Not on file     Attends meetings of clubs or organizations: Not on file     Relationship status: Not on file     Intimate partner violence:     Fear of current or ex partner: Not on file     Emotionally abused: Not on file     Physically abused: Not on file     Forced sexual activity: Not on file   Other Topics Concern     Parent/sibling w/ CABG, MI or angioplasty before 65F 55M? Not Asked   Social History Narrative     Not on file            Review of Systems:   Skin:  Negative     Eyes:  Negative    ENT:  Negative    Respiratory:  Positive for dyspnea on exertion  Cardiovascular:  Negative edema;Positive for  Gastroenterology: Negative    Genitourinary:  Negative    Musculoskeletal:  Positive for arthritis;joint pain  Neurologic:  Negative    Psychiatric:  Positive for anxiety;depression  Heme/Lymph/Imm:  Negative    Endocrine:  Negative           Physical Exam:   Vitals: /68   Pulse 90   Ht 1.613 m (5' 3.5\")   Wt 70.8 kg (156 lb)   SpO2 95%   BMI 27.20 kg/m     Wt Readings from Last 4 Encounters:   07/09/19 70.8 kg (156 lb)   07/09/19 70.8 kg (156 lb)   07/01/19 74.6 kg (164 lb 8 oz)   06/19/19 73.4 kg (161 lb 14.4 oz)     GEN: well nourished, in no acute distress.  HEENT:  Pupils equal, round. Sclerae nonicteric.   NECK: Supple, no masses appreciated. JVD slightly elevated  C/V:  Regular rate and rhythm, no murmur, rub or gallop.    RESP: Respirations are unlabored. Clear to " auscultation bilaterally without wheezing, rales, or rhonchi.  GI: Abdomen soft, nontender.  EXTREM: Bilateral +1 LE edema, pretibial, to approximately 3 inches below the knees. Venous stasis pigment changes present.   NEURO: Alert and oriented, cooperative.  SKIN: Warm and dry.        Data:   ECHO 6/26/19  The left ventricle is normal in size.  Left ventricular systolic function is normal.  The visual ejection fraction is estimated at 60-65%.  Normal left ventricular wall motion  The right ventricle is mild to moderately dilated.  Mildly decreased right ventricular systolic function  There is mild (1+) mitral regurgitation.  There is moderate (2+) tricuspid regurgitation.  IVC diameter and respiratory changes fall into an intermediate range  suggesting an RA pressure of 8 mmHg.  Right ventricular systolic pressure is elevated, consistent with mild  pulmonary hypertension.     Since the previous study, the RV now appears larger with mildly decreased  function. TR has increased.    LIPID RESULTS:  Lab Results   Component Value Date    TRIG 96 01/28/2019     LIVER ENZYME RESULTS:  Lab Results   Component Value Date    AST 27 04/04/2019    ALT 19 04/04/2019     CBC RESULTS:  Lab Results   Component Value Date    WBC 6.7 04/04/2019    RBC 3.70 (L) 04/04/2019    HGB 9.1 (L) 04/04/2019    HCT 31.7 (L) 04/04/2019    MCV 86 04/04/2019    MCH 24.6 (L) 04/04/2019    MCHC 28.7 (L) 04/04/2019    RDW 17.5 (H) 04/04/2019     04/04/2019     BMP RESULTS:  Lab Results   Component Value Date     07/09/2019    POTASSIUM 3.8 07/09/2019    CHLORIDE 100 07/09/2019    CO2 30 (H) 07/09/2019    ANIONGAP 11.8 07/09/2019    GLC 93 07/09/2019    BUN 21 07/09/2019    CR 1.49 (H) 07/09/2019    GFRESTIMATED 34 (L) 07/09/2019    GFRESTBLACK 41 (L) 07/09/2019    BESS 10.0 07/09/2019      A1C RESULTS:  Lab Results   Component Value Date    A1C 5.3 01/04/2019     INR RESULTS:  Lab Results   Component Value Date    INR 2.66 (H) 04/04/2019     INR 2.32 (H) 01/29/2019            Medications     Current Outpatient Medications   Medication Sig Dispense Refill     acetaminophen (TYLENOL) 325 MG tablet Take 2 tablets (650 mg) by mouth every 4 hours as needed for other (multimodal surgical pain management along with NSAIDS and opioid medication as indicated based on pain control and physical function.)       aspirin (ASA) 81 MG EC tablet Take 81 mg by mouth every 24 hours       cholecalciferol (VITAMIN D-1000 MAX ST) 1000 units TABS Take 1,000 Units by mouth daily        cyanocobalamin (VITAMIN B-12) 100 MCG tablet Take 2,000 mcg by mouth daily       escitalopram (LEXAPRO) 10 MG tablet Take 10 mg by mouth daily       metoprolol tartrate (LOPRESSOR) 25 MG tablet Take 25 mg by mouth 2 times daily       mirtazapine (REMERON) 15 MG tablet Take 15 mg by mouth       potassium chloride (KLOR-CON) 20 MEQ packet Take 20 mEq by mouth       torsemide (DEMADEX) 20 MG tablet Take 20 mg by mouth daily   3     warfarin (COUMADIN) 2 MG tablet Take 1 tablet (2 mg) by mouth daily (Patient taking differently: Take 3 mg by mouth daily )       Warfarin Therapy Reminder 1 each continuous prn            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, s/p Replacement of the ascending aorta with a 34 mm Hemashield branch graft 1/4/19     Hypertension      Persistent atrial fibrillation (H)     post-op 1/2019: AV node ablation and single chamber pacemaker implanted 1/11/2019     Pleural effusion, bilateral     s/p left thoracentesis of 150cc 1/11/19;  right thoracentesis of 400cc 1/24/2019     Past Surgical History:   Procedure Laterality Date     ANGIOGRAM Right 1/4/2019    Procedure: DIAGONSTIC ANGIOGRAM OF SMA;  Surgeon: Rigo Jennings MD;  Location: [facility] OR     BYPASS GRAFT ARTERY CORONARY, REPAIR VALVE AORTIC, COMBINED N/A 1/4/2019    Procedure: AORTIC DISSECTION REPAIR WITH GRAFT- HEMASHIELD  PLATINUM WOVEN DOUBLE VELOR 4 SIDE ARM VASCULAR GRAFT, D:35 X 12 X 10 X 10MM/ L:50CM;  Surgeon: Jose Winslow MD;  Location:  OR     EP ABLATION AV NODE N/A 1/11/2019    Procedure: EP Ablation AV Node;  Surgeon: Tsering Davey MD;  Location:  HEART CARDIAC CATH LAB     EP PACEMAKER Left 1/11/2019    Procedure: EP Pacemaker;  Surgeon: Tsering Davey MD;  Location:  HEART CARDIAC CATH LAB     TRACHEOSTOMY N/A 1/25/2019    Procedure: TRACHEOSTOMY  (DR MCCLELLAND);  Surgeon: Bryson Mcclelland MD;  Location:  OR     No family history on file.         Allergies   Amoxicillin and Ciprofloxacin        [facility]  Pager: [phone]

## 2019-07-09 NOTE — LETTER
7/9/2019    Alecia Millan MD, MD  [employer] [address]  [facility] [address]    RE: Priya Funez       Dear Colleague,    I had the pleasure of seeing Priya Funez in the [facility].    HPI:   I had the pleasure of seeing Priya when she came for follow up of atrial fibrillation.  This 78 year old has seen Dr. Morales for her history of:    1.  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  Prolonged ischemia to the SMA and right renal regions at that time.  Required tracheostomy/long-term TCU recovery, but now back home  2.  Atrial fibrillation on anticoagulation in the setting of acute dissection/emergent repair.  S/p AV node ablation and single-chamber Medtronic pacemaker 1/2019  3.  HFpEF with most recent echocardiogram showing EF 60-65%.  Complaints of significant lower extremity edema and now sent to the lymphedema clinic.  Was established in CORE clinic earlier today (Xochitl Berman NP)  4.  Benign essential hypertension      Lavonne presented to the ER 1/4/2019 complaining of significant jaw pain.  She had significant nausea as well.  In the ED, CT revealed extensive aortic dissection for the ascending thoracic aorta through the proximal aortic bifurcation in the abdomen with occlusion of the right renal artery and SMA.  She underwent emergent replacement of the ascending aorta with a 34 mm Hemashield branch graft.  Aortic valve was resuspended.  Noted to have rapid A. fib which was very difficult to control.  Dr. Yusuf was consulted, who noted that despite metoprolol and the amiodarone, heart rates were difficult to control.  She actually had converted back to sinus rhythm, but had more trouble with atrial fibrillation and underwent AV Node ablation and single chamber Medtronic pacemaker on 1/11/2019.    - From PPM standpoint, doing well. No c/o incisional discmofort    Diagnostic  Testing:    Device interrogation today showed 100%  in VVIR  bpm.  Underlying rhythm was AFib with CHB achieved by AVNA. No junctional escape. Normal sensing, threshold and impedance. No ventricular arrhythmias. LRL decreased to 60 bpm per clinic protocol.    Echocardiogram 6/2019 showed EF 60 - 65%.  RV was mild-moderately dilated with mildly decreased RVSF.  2+ TR, with RVSP mildly elevated at 27+ right atrial pressure.  RA pressure was slightly elevated at 8 mmHg.  Left atrium was mildly dilated @3.5 cm with a volume index of 21.9 mL/m .  1+ MR,    Exercise stress echocardiogram 8/2017 showed no evidence of stress-induced ischemia.    Assessment & Plan:    1. Permanent Atrial Fibrillation    Noted in the setting of acute aortic dissection s/p repair    On AC with warfarin. No bleeding issues    S/p AVN ablation and single chamber Medtronic PPM 1/2019 and device interrogation as above    PLAN:    Continue routine device checks    See me 1 year with annual device interrogation    Continue warfarin      2. Aortic Dissection    S/p repair of dissection    Due for CT aorta 7/2019 (scheduled)    PLAN:    Per Dr. Morales, Aorta Clinic    3. HFpEF    Echo showed normal EF and no significant valvular abnormalities    Saw Xochitl Berman earlier today to establish in CORE clinic     PLAN:    Continue CORE f/u as suggested by Xochitl and Dr. Carmen Goddard PA-C, MSPAS      Orders Placed This Encounter   Procedures     Follow-Up with Cardiac Advanced Practice Provider     EKG 12-lead complete w/read - Clinics     No orders of the defined types were placed in this encounter.    There are no discontinued medications.      Encounter Diagnoses   Name Primary?     Persistent atrial fibrillation (H) Yes     Pacemaker        CURRENT MEDICATIONS:  Current Outpatient Medications   Medication Sig Dispense Refill     acetaminophen (TYLENOL) 325 MG tablet Take 2 tablets (650 mg) by mouth every 4 hours as needed for other  (multimodal surgical pain management along with NSAIDS and opioid medication as indicated based on pain control and physical function.)       aspirin (ASA) 81 MG EC tablet Take 81 mg by mouth every 24 hours       cholecalciferol (VITAMIN D-1000 MAX ST) 1000 units TABS Take 1,000 Units by mouth daily        cyanocobalamin (VITAMIN B-12) 100 MCG tablet Take 2,000 mcg by mouth daily       escitalopram (LEXAPRO) 10 MG tablet Take 10 mg by mouth daily       metoprolol tartrate (LOPRESSOR) 25 MG tablet Take 25 mg by mouth 2 times daily       mirtazapine (REMERON) 15 MG tablet Take 15 mg by mouth       potassium chloride (KLOR-CON) 20 MEQ packet Take 20 mEq by mouth       torsemide (DEMADEX) 20 MG tablet Take 20 mg by mouth daily   3     warfarin (COUMADIN) 2 MG tablet Take 1 tablet (2 mg) by mouth daily (Patient taking differently: Take 3 mg by mouth daily )       Warfarin Therapy Reminder 1 each continuous prn         ALLERGIES     Allergies   Allergen Reactions     Amoxicillin Swelling     Ciprofloxacin Swelling       PAST MEDICAL HISTORY:  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, s/p Replacement of the ascending aorta with a 34 mm Hemashield branch graft 1/4/19     Hypertension      Persistent atrial fibrillation (H)     post-op 1/2019: AV node ablation and single chamber pacemaker implanted 1/11/2019     Pleural effusion, bilateral     s/p left thoracentesis of 150cc 1/11/19;  right thoracentesis of 400cc 1/24/2019       PAST SURGICAL HISTORY:  Past Surgical History:   Procedure Laterality Date     ANGIOGRAM Right 1/4/2019    Procedure: DIAGONSTIC ANGIOGRAM OF SMA;  Surgeon: Rigo Jennings MD;  Location: [facility] OR     BYPASS GRAFT ARTERY CORONARY, REPAIR VALVE AORTIC, COMBINED N/A 1/4/2019    Procedure: AORTIC DISSECTION REPAIR WITH GRAFT- HEMASHIELD PLATINUM WOVEN DOUBLE VELOR 4 SIDE ARM VASCULAR GRAFT, D:35 X 12 X 10 X 10MM/ L:50CM;  Surgeon: Jose Winslow  MD Ancelmo;  Location:  OR     EP ABLATION AV NODE N/A 1/11/2019    Procedure: EP Ablation AV Node;  Surgeon: Tsering Davey MD;  Location:  HEART CARDIAC CATH LAB     EP PACEMAKER Left 1/11/2019    Procedure: EP Pacemaker;  Surgeon: Tsering Davey MD;  Location:  HEART CARDIAC CATH LAB     TRACHEOSTOMY N/A 1/25/2019    Procedure: TRACHEOSTOMY  (DR MCCLELLAND);  Surgeon: Bryson Mcclelland MD;  Location:  OR       FAMILY HISTORY:  No family history on file.    SOCIAL HISTORY:  Social History     Socioeconomic History     Marital status:      Spouse name: None     Number of children: None     Years of education: None     Highest education level: None   Occupational History     None   Social Needs     Financial resource strain: None     Food insecurity:     Worry: None     Inability: None     Transportation needs:     Medical: None     Non-medical: None   Tobacco Use     Smoking status: Never Smoker     Smokeless tobacco: Never Used     Tobacco comment: hisband   Substance and Sexual Activity     Alcohol use: No     Frequency: Never     Comment: no alcohol 7/9/19     Drug use: No     Sexual activity: None   Lifestyle     Physical activity:     Days per week: None     Minutes per session: None     Stress: None   Relationships     Social connections:     Talks on phone: None     Gets together: None     Attends Islam service: None     Active member of club or organization: None     Attends meetings of clubs or organizations: None     Relationship status: None     Intimate partner violence:     Fear of current or ex partner: None     Emotionally abused: None     Physically abused: None     Forced sexual activity: None   Other Topics Concern     Parent/sibling w/ CABG, MI or angioplasty before 65F 55M? Not Asked   Social History Narrative     None       Review of Systems:  Skin:  Negative     Eyes:  Negative    ENT:  Negative    Respiratory:  Positive for dyspnea on exertion  Cardiovascular:  Negative  "for;palpitations;chest pain edema;Positive for;exercise intolerance;fatigue  Gastroenterology: Negative for melena;hematochezia  Genitourinary:  Negative    Musculoskeletal:  Positive for arthritis;joint pain  Neurologic:  Negative    Psychiatric:  Positive for anxiety;depression  Heme/Lymph/Imm:  Negative    Endocrine:  Negative      Physical Exam:  Vitals: /74   Pulse 89   Ht 1.613 m (5' 3.5\")   Wt 70.8 kg (156 lb)   BMI 27.20 kg/m       Constitutional:  in no acute distress        Skin:  not assessed this visit        Head:  not assessed this visit        Eyes:  not assessed this visit        ENT:  not assessed this visit        Neck:  not assessed this visit        Chest:  healed median sternotomy scar;normal respiratory excursion        Cardiac: regular rhythm;no murmurs, gallops or rubs detected   distant heart sounds              Abdomen:  not assessed this visit        Vascular: not assessed this visit                                      Extremities and Back:  not assessed this visit        Neurological:  no gross motor deficits          Recent Lab Results:  LIPID RESULTS:  Lab Results   Component Value Date    TRIG 96 01/28/2019       LIVER ENZYME RESULTS:  Lab Results   Component Value Date    AST 27 04/04/2019    ALT 19 04/04/2019       CBC RESULTS:  Lab Results   Component Value Date    WBC 6.7 04/04/2019    RBC 3.70 (L) 04/04/2019    HGB 9.1 (L) 04/04/2019    HCT 31.7 (L) 04/04/2019    MCV 86 04/04/2019    MCH 24.6 (L) 04/04/2019    MCHC 28.7 (L) 04/04/2019    RDW 17.5 (H) 04/04/2019     04/04/2019       BMP RESULTS:  Lab Results   Component Value Date     07/09/2019    POTASSIUM 3.8 07/09/2019    CHLORIDE 100 07/09/2019    CO2 30 (H) 07/09/2019    ANIONGAP 11.8 07/09/2019    GLC 93 07/09/2019    BUN 21 07/09/2019    CR 1.49 (H) 07/09/2019    GFRESTIMATED 34 (L) 07/09/2019    GFRESTBLACK 41 (L) 07/09/2019    BESS 10.0 07/09/2019        A1C RESULTS:  Lab Results   Component Value " Date    A1C 5.3 01/04/2019       INR RESULTS:  Lab Results   Component Value Date    INR 2.66 (H) 04/04/2019    INR 2.32 (H) 01/29/2019           Thank you for allowing me to participate in the care of your patient.    Sincerely,     Grace Goddard PA-C     [facility]

## 2019-07-09 NOTE — LETTER
7/9/2019    Alecia Millan MD, MD  [facility] [address]  [facility] [address]    RE: Priya Funez       Dear Colleague,    I had the pleasure of seeing Priya Funez in the [facility].    Cardiology Clinic Progress Note  Priya Funez MRN# [id number]   YOB: 1940 Age: 78 year old   Primary Cardiologist: Dr. Morales Reason for visit: CORE enrollment            Assessment and Plan:   Priya Funez is a very pleasant 78 year old female with a history of HFpEF, atrial fibrillation s/p AV leo ablation with PPM implantation 1/11/2019, emergent aortic dissection repair 1/4/19, hypertension and obesity. Patient here today for CORE enrollment.       1.  Chronic diastolic heart failure/HFpEF :  LVEF 60-65%, LV normal size/function, RV mild to moderately dilated, RV systolic function mildly decreased. Patient has been monitoring weight at home but unable to recall readings. Notes she didn't take torsemide 20mg BID every day, only 3-4 days since visit with Dr. Morales last week. Weight is down 8# since 7/1/19, patient noting improvement in symptoms. Labs today show worsening kidney function, creatinine at baseline 0.8-0.9 -> 1.49 today. BUN 21. Electrolytes stable. ? Overdiuresis. Will decrease torsemide back to 20mg daily and have patient follow up closely in CORE clinic.    - NYHA class III, stage C   - Fluid status : close to euvolemic   - Diuretic regimen : DECREASE torsemide 20mg daily   - Aldosterone antagonist : will consider in future if kidney function stable.    - Blood pressure : controlled   - HF education provided today, given written education materials, extensively reviewed low sodium diet and when to notify CORE clinic.    - Lymphedema clinic appt scheduled for 7/18/19   - Reviewed consideration for WEL program, which she states she will consider.     2. Chronic atrial fibrillation s/p AV node ablation 1/2019 - stable, asymptomatic.    - Continue  routine follow up with EP and device clinic   - [id number] score 3 (age, female, HF)   - Continue warfarin for thromboembolic prophylaxis    3. Emergent aortic dissection repair 1/4/19   - CV surgery recommends repeat CT 6 months after surgery for surveillance - scheduled for tomorrow.     4. Hypertension - controlled, continue current medications    5. Obesity - counseled patient on weight loss strategies.    6. Mild mitral regurgitation    7. Moderate tricuspid regurgitation      Changes today: DECREASE torsemide to 20mg daily     Follow up plan:     CORE follow up 7/19/19.     Lymphedema evaluation 7/18/19          History of Presenting Illness:    Priya Funez is a very pleasant 78 year old female with a history of HFpEF, atrial fibrillation s/p AV leo ablation with PPM implantation 1/11/2019, emergent aortic dissection repair 1/4/19, hypertension and obesity.     Patient presented in January with dissecting aortic aneurysm, she was hospitalized from 1/4/19-1/29/19. Patient had a complicated hospital course. There was prolonged ischemia to the SMA and right renal regions. She required tracheostomy placement and was discharged to [place] on ventilatory support. Patients tracheostomy has been discontinued and she transitioned home the beginning of June.     She was most recently seen by Dr. Morales on 7/1/19 at which point patient was noted to be volume up on exam. Recommended increasing her torsemide to 20mg BID. Lymphedema clinic referral placed and CORE consult ordered.    Echocardiogram 6/26/19 showed LVEF 60-65%, LV normal size/function, RV mild to moderately dilated, RV systolic function mildly decreased, mild MR, moderate tricuspid regurgitation     Patient is here today for CORE enrollment.     Patient reports feeling good. Monitoring weights most days at home, unable to recall readings on home scale. Clinic weight today is 156# which is down from 164# on 7/1/19. States she took the 40mg torsemide  "3 to 4 of the days since visit with Dr. Morales on 7/1/19, but not every day. Always consistently take torsemide 20mg in the morning. Reports significant improvement in lower extremity edema. Wearing compression stockings. Denies abdominal distention/bloating. Denies shortness of breath at rest. Will note exertional dyspnea with activity, \"long walk\". Able to complete ADLS and IADLs independently. Denies PND/orthopnea this past week. Energy levels are good. Patient denies chest pain or chest tightness. Denies dizziness, lightheadedness or other presyncopal symptoms. Denies tachycardia or palpitations.     Labs today show worsening kidney function, creatinine 1.49, BUN 21. Electrolytes stable. Patient brought labs from PCP visit on 6/3/19 which show a creatinine of 0.97 and BUN of 20. Potassium 4.1. Hemoglobin 11.3. Blood pressure 106/68 and HR 90 in clinic today.    Appetite good. Eating 1/2 meals out and 1/2 meals at home. Not adding additional salt to foods. Typically eating at [employer], [email or web address]. Enjoys cottage cheese. Enjoys grilling at home. Typically eating a piece of grilled meat and frozen vegetables. Complete home PT/OT. Walking in the mall 2-3 days per week for activity. Denies alcohol use. Denies tobacco use.         Recent Hospitalizations   1/4/19-1/29/19 r/t dissecting aortic aneurysm, underwent emergent repair 1/4/19. Patient had a complicated hospital course. There was prolonged ischemia to the SMA and right renal regions. She required tracheostomy placement and was discharged to [place] on ventilatory support.         Social History     55 years, 4 children, 8 grandchildren, enjoys her gardens in the summer.   Social History     Socioeconomic History     Marital status:      Spouse name: Not on file     Number of children: Not on file     Years of education: Not on file     Highest education level: Not on file   Occupational History     Not on file   Social Needs     Financial " "resource strain: Not on file     Food insecurity:     Worry: Not on file     Inability: Not on file     Transportation needs:     Medical: Not on file     Non-medical: Not on file   Tobacco Use     Smoking status: Never Smoker     Smokeless tobacco: Never Used     Tobacco comment: hisband   Substance and Sexual Activity     Alcohol use: No     Frequency: Never     Comment: no alcohol 7/9/19     Drug use: No     Sexual activity: Not on file   Lifestyle     Physical activity:     Days per week: Not on file     Minutes per session: Not on file     Stress: Not on file   Relationships     Social connections:     Talks on phone: Not on file     Gets together: Not on file     Attends Anabaptist service: Not on file     Active member of club or organization: Not on file     Attends meetings of clubs or organizations: Not on file     Relationship status: Not on file     Intimate partner violence:     Fear of current or ex partner: Not on file     Emotionally abused: Not on file     Physically abused: Not on file     Forced sexual activity: Not on file   Other Topics Concern     Parent/sibling w/ CABG, MI or angioplasty before 65F 55M? Not Asked   Social History Narrative     Not on file            Review of Systems:   Skin:  Negative     Eyes:  Negative    ENT:  Negative    Respiratory:  Positive for dyspnea on exertion  Cardiovascular:  Negative edema;Positive for  Gastroenterology: Negative    Genitourinary:  Negative    Musculoskeletal:  Positive for arthritis;joint pain  Neurologic:  Negative    Psychiatric:  Positive for anxiety;depression  Heme/Lymph/Imm:  Negative    Endocrine:  Negative           Physical Exam:   Vitals: /68   Pulse 90   Ht 1.613 m (5' 3.5\")   Wt 70.8 kg (156 lb)   SpO2 95%   BMI 27.20 kg/m      Wt Readings from Last 4 Encounters:   07/09/19 70.8 kg (156 lb)   07/09/19 70.8 kg (156 lb)   07/01/19 74.6 kg (164 lb 8 oz)   06/19/19 73.4 kg (161 lb 14.4 oz)     GEN: well nourished, in no acute " distress.  HEENT:  Pupils equal, round. Sclerae nonicteric.   NECK: Supple, no masses appreciated. JVD slightly elevated  C/V:  Regular rate and rhythm, no murmur, rub or gallop.    RESP: Respirations are unlabored. Clear to auscultation bilaterally without wheezing, rales, or rhonchi.  GI: Abdomen soft, nontender.  EXTREM: Bilateral +1 LE edema, pretibial, to approximately 3 inches below the knees. Venous stasis pigment changes present.   NEURO: Alert and oriented, cooperative.  SKIN: Warm and dry.        Data:   ECHO 6/26/19  The left ventricle is normal in size.  Left ventricular systolic function is normal.  The visual ejection fraction is estimated at 60-65%.  Normal left ventricular wall motion  The right ventricle is mild to moderately dilated.  Mildly decreased right ventricular systolic function  There is mild (1+) mitral regurgitation.  There is moderate (2+) tricuspid regurgitation.  IVC diameter and respiratory changes fall into an intermediate range  suggesting an RA pressure of 8 mmHg.  Right ventricular systolic pressure is elevated, consistent with mild  pulmonary hypertension.     Since the previous study, the RV now appears larger with mildly decreased  function. TR has increased.    LIPID RESULTS:  Lab Results   Component Value Date    TRIG 96 01/28/2019     LIVER ENZYME RESULTS:  Lab Results   Component Value Date    AST 27 04/04/2019    ALT 19 04/04/2019     CBC RESULTS:  Lab Results   Component Value Date    WBC 6.7 04/04/2019    RBC 3.70 (L) 04/04/2019    HGB 9.1 (L) 04/04/2019    HCT 31.7 (L) 04/04/2019    MCV 86 04/04/2019    MCH 24.6 (L) 04/04/2019    MCHC 28.7 (L) 04/04/2019    RDW 17.5 (H) 04/04/2019     04/04/2019     BMP RESULTS:  Lab Results   Component Value Date     07/09/2019    POTASSIUM 3.8 07/09/2019    CHLORIDE 100 07/09/2019    CO2 30 (H) 07/09/2019    ANIONGAP 11.8 07/09/2019    GLC 93 07/09/2019    BUN 21 07/09/2019    CR 1.49 (H) 07/09/2019    GFRESTIMATED 34  (L) 07/09/2019    GFRESTBLACK 41 (L) 07/09/2019    BESS 10.0 07/09/2019      A1C RESULTS:  Lab Results   Component Value Date    A1C 5.3 01/04/2019     INR RESULTS:  Lab Results   Component Value Date    INR 2.66 (H) 04/04/2019    INR 2.32 (H) 01/29/2019            Medications     Current Outpatient Medications   Medication Sig Dispense Refill     acetaminophen (TYLENOL) 325 MG tablet Take 2 tablets (650 mg) by mouth every 4 hours as needed for other (multimodal surgical pain management along with NSAIDS and opioid medication as indicated based on pain control and physical function.)       aspirin (ASA) 81 MG EC tablet Take 81 mg by mouth every 24 hours       cholecalciferol (VITAMIN D-1000 MAX ST) 1000 units TABS Take 1,000 Units by mouth daily        cyanocobalamin (VITAMIN B-12) 100 MCG tablet Take 2,000 mcg by mouth daily       escitalopram (LEXAPRO) 10 MG tablet Take 10 mg by mouth daily       metoprolol tartrate (LOPRESSOR) 25 MG tablet Take 25 mg by mouth 2 times daily       mirtazapine (REMERON) 15 MG tablet Take 15 mg by mouth       potassium chloride (KLOR-CON) 20 MEQ packet Take 20 mEq by mouth       torsemide (DEMADEX) 20 MG tablet Take 20 mg by mouth daily   3     warfarin (COUMADIN) 2 MG tablet Take 1 tablet (2 mg) by mouth daily (Patient taking differently: Take 3 mg by mouth daily )       Warfarin Therapy Reminder 1 each continuous prn            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, s/p Replacement of the ascending aorta with a 34 mm Hemashield branch graft 1/4/19     Hypertension      Persistent atrial fibrillation (H)     post-op 1/2019: AV node ablation and single chamber pacemaker implanted 1/11/2019     Pleural effusion, bilateral     s/p left thoracentesis of 150cc 1/11/19;  right thoracentesis of 400cc 1/24/2019     Past Surgical History:   Procedure Laterality Date     ANGIOGRAM Right 1/4/2019    Procedure:  DIAGONSTIC ANGIOGRAM OF SMA;  Surgeon: Rigo Jennings MD;  Location:  OR     BYPASS GRAFT ARTERY CORONARY, REPAIR VALVE AORTIC, COMBINED N/A 1/4/2019    Procedure: AORTIC DISSECTION REPAIR WITH GRAFT- HEMASHIELD PLATINUM WOVEN DOUBLE VELOR 4 SIDE ARM VASCULAR GRAFT, D:35 X 12 X 10 X 10MM/ L:50CM;  Surgeon: Jose Winslow MD;  Location:  OR     EP ABLATION AV NODE N/A 1/11/2019    Procedure: EP Ablation AV Node;  Surgeon: Tsering Davey MD;  Location:  HEART CARDIAC CATH LAB     EP PACEMAKER Left 1/11/2019    Procedure: EP Pacemaker;  Surgeon: Tsering Davey MD;  Location:  HEART CARDIAC CATH LAB     TRACHEOSTOMY N/A 1/25/2019    Procedure: TRACHEOSTOMY  (DR MCCLELLAND);  Surgeon: Bryson Mcclelland MD;  Location:  OR     No family history on file.         Allergies   Amoxicillin and Ciprofloxacin        TAMRA Ridley CNP  [facility]  Pager: [phone]      Thank you for allowing me to participate in the care of your patient.    Sincerely,     TAMRA Ridley CNP     [facility]

## 2019-07-09 NOTE — PROGRESS NOTES
HPI:   I had the pleasure of seeing Priya when she came for follow up of atrial fibrillation.  This 78 year old has seen Dr. Morales for her history of:    1.  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  Prolonged ischemia to the SMA and right renal regions at that time.  Required tracheostomy/long-term TCU recovery, but now back home  2.  Atrial fibrillation on anticoagulation in the setting of acute dissection/emergent repair.  S/p AV node ablation and single-chamber Medtronic pacemaker 1/2019  3.  HFpEF with most recent echocardiogram showing EF 60-65%.  Complaints of significant lower extremity edema and now sent to the lymphedema clinic.  Was established in CORE clinic earlier today (Xochitl Berman NP)  4.  Benign essential hypertension      Lavonne presented to the ER 1/4/2019 complaining of significant jaw pain.  She had significant nausea as well.  In the ED, CT revealed extensive aortic dissection for the ascending thoracic aorta through the proximal aortic bifurcation in the abdomen with occlusion of the right renal artery and SMA.  She underwent emergent replacement of the ascending aorta with a 34 mm Hemashield branch graft.  Aortic valve was resuspended.  Noted to have rapid A. fib which was very difficult to control.  Dr. Yusuf was consulted, who noted that despite metoprolol and the amiodarone, heart rates were difficult to control.  She actually had converted back to sinus rhythm, but had more trouble with atrial fibrillation and underwent AV Node ablation and single chamber Medtronic pacemaker on 1/11/2019.    - From PPM standpoint, doing well. No c/o incisional discmofort    Diagnostic Testing:    Device interrogation today showed 100%  in VVIR  bpm.  Underlying rhythm was AFib with CHB achieved by AVNA. No junctional escape. Normal sensing, threshold and impedance. No ventricular arrhythmias. LRL decreased  to 60 bpm per clinic protocol.    Echocardiogram 6/2019 showed EF 60 - 65%.  RV was mild-moderately dilated with mildly decreased RVSF.  2+ TR, with RVSP mildly elevated at 27+ right atrial pressure.  RA pressure was slightly elevated at 8 mmHg.  Left atrium was mildly dilated @3.5 cm with a volume index of 21.9 mL/m .  1+ MR,    Exercise stress echocardiogram 8/2017 showed no evidence of stress-induced ischemia.    Assessment & Plan:    1. Permanent Atrial Fibrillation    Noted in the setting of acute aortic dissection s/p repair    On AC with warfarin. No bleeding issues    S/p AVN ablation and single chamber Medtronic PPM 1/2019 and device interrogation as above    PLAN:    Continue routine device checks    See me 1 year with annual device interrogation    Continue warfarin      2. Aortic Dissection    S/p repair of dissection    Due for CT aorta 7/2019 (scheduled)    PLAN:    Per Dr. Morales, Aorta Clinic    3. HFpEF    Echo showed normal EF and no significant valvular abnormalities    Saw Xochitl Berman earlier today to establish in CORE clinic     PLAN:    Continue CORE f/u as suggested by Xochitl and Dr. Carmen Goddard PA-C, MSPAS      Orders Placed This Encounter   Procedures     Follow-Up with Cardiac Advanced Practice Provider     EKG 12-lead complete w/read - Clinics     No orders of the defined types were placed in this encounter.    There are no discontinued medications.      Encounter Diagnoses   Name Primary?     Persistent atrial fibrillation (H) Yes     Pacemaker        CURRENT MEDICATIONS:  Current Outpatient Medications   Medication Sig Dispense Refill     acetaminophen (TYLENOL) 325 MG tablet Take 2 tablets (650 mg) by mouth every 4 hours as needed for other (multimodal surgical pain management along with NSAIDS and opioid medication as indicated based on pain control and physical function.)       aspirin (ASA) 81 MG EC tablet Take 81 mg by mouth every 24 hours       cholecalciferol (VITAMIN  D-1000 MAX ST) 1000 units TABS Take 1,000 Units by mouth daily        cyanocobalamin (VITAMIN B-12) 100 MCG tablet Take 2,000 mcg by mouth daily       escitalopram (LEXAPRO) 10 MG tablet Take 10 mg by mouth daily       metoprolol tartrate (LOPRESSOR) 25 MG tablet Take 25 mg by mouth 2 times daily       mirtazapine (REMERON) 15 MG tablet Take 15 mg by mouth       potassium chloride (KLOR-CON) 20 MEQ packet Take 20 mEq by mouth       torsemide (DEMADEX) 20 MG tablet Take 20 mg by mouth daily   3     warfarin (COUMADIN) 2 MG tablet Take 1 tablet (2 mg) by mouth daily (Patient taking differently: Take 3 mg by mouth daily )       Warfarin Therapy Reminder 1 each continuous prn         ALLERGIES     Allergies   Allergen Reactions     Amoxicillin Swelling     Ciprofloxacin Swelling       PAST MEDICAL HISTORY:  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, s/p Replacement of the ascending aorta with a 34 mm Hemashield branch graft 1/4/19     Hypertension      Persistent atrial fibrillation (H)     post-op 1/2019: AV node ablation and single chamber pacemaker implanted 1/11/2019     Pleural effusion, bilateral     s/p left thoracentesis of 150cc 1/11/19;  right thoracentesis of 400cc 1/24/2019       PAST SURGICAL HISTORY:  Past Surgical History:   Procedure Laterality Date     ANGIOGRAM Right 1/4/2019    Procedure: DIAGONSTIC ANGIOGRAM OF SMA;  Surgeon: Rigo Jennings MD;  Location:  OR     BYPASS GRAFT ARTERY CORONARY, REPAIR VALVE AORTIC, COMBINED N/A 1/4/2019    Procedure: AORTIC DISSECTION REPAIR WITH GRAFT- HEMASHIELD PLATINUM WOVEN DOUBLE VELOR 4 SIDE ARM VASCULAR GRAFT, D:35 X 12 X 10 X 10MM/ L:50CM;  Surgeon: Jose Winslow MD;  Location:  OR     EP ABLATION AV NODE N/A 1/11/2019    Procedure: EP Ablation AV Node;  Surgeon: Tsering Davey MD;  Location:  HEART CARDIAC CATH LAB     EP PACEMAKER Left 1/11/2019    Procedure: EP Pacemaker;  Surgeon:  Tsering Davey MD;  Location:  HEART CARDIAC CATH LAB     TRACHEOSTOMY N/A 1/25/2019    Procedure: TRACHEOSTOMY  (DR MCCLELLAND);  Surgeon: Bryson Mcclelland MD;  Location:  OR       FAMILY HISTORY:  No family history on file.    SOCIAL HISTORY:  Social History     Socioeconomic History     Marital status:      Spouse name: None     Number of children: None     Years of education: None     Highest education level: None   Occupational History     None   Social Needs     Financial resource strain: None     Food insecurity:     Worry: None     Inability: None     Transportation needs:     Medical: None     Non-medical: None   Tobacco Use     Smoking status: Never Smoker     Smokeless tobacco: Never Used     Tobacco comment: hisband   Substance and Sexual Activity     Alcohol use: No     Frequency: Never     Comment: no alcohol 7/9/19     Drug use: No     Sexual activity: None   Lifestyle     Physical activity:     Days per week: None     Minutes per session: None     Stress: None   Relationships     Social connections:     Talks on phone: None     Gets together: None     Attends [employer] service: None     Active member of club or organization: None     Attends meetings of clubs or organizations: None     Relationship status: None     Intimate partner violence:     Fear of current or ex partner: None     Emotionally abused: None     Physically abused: None     Forced sexual activity: None   Other Topics Concern     Parent/sibling w/ CABG, MI or angioplasty before 65F 55M? Not Asked   Social History Narrative     None       Review of Systems:  Skin:  Negative     Eyes:  Negative    ENT:  Negative    Respiratory:  Positive for dyspnea on exertion  Cardiovascular:  Negative for;palpitations;chest pain edema;Positive for;exercise intolerance;fatigue  Gastroenterology: Negative for melena;hematochezia  Genitourinary:  Negative    Musculoskeletal:  Positive for arthritis;joint pain  Neurologic:  Negative   "  Psychiatric:  Positive for anxiety;depression  Heme/Lymph/Imm:  Negative    Endocrine:  Negative      Physical Exam:  Vitals: /74   Pulse 89   Ht 1.613 m (5' 3.5\")   Wt 70.8 kg (156 lb)   BMI 27.20 kg/m      Constitutional:  in no acute distress        Skin:  not assessed this visit        Head:  not assessed this visit        Eyes:  not assessed this visit        ENT:  not assessed this visit        Neck:  not assessed this visit        Chest:  healed median sternotomy scar;normal respiratory excursion        Cardiac: regular rhythm;no murmurs, gallops or rubs detected   distant heart sounds              Abdomen:  not assessed this visit        Vascular: not assessed this visit                                      Extremities and Back:  not assessed this visit        Neurological:  no gross motor deficits          Recent Lab Results:  LIPID RESULTS:  Lab Results   Component Value Date    TRIG 96 01/28/2019       LIVER ENZYME RESULTS:  Lab Results   Component Value Date    AST 27 04/04/2019    ALT 19 04/04/2019       CBC RESULTS:  Lab Results   Component Value Date    WBC 6.7 04/04/2019    RBC 3.70 (L) 04/04/2019    HGB 9.1 (L) 04/04/2019    HCT 31.7 (L) 04/04/2019    MCV 86 04/04/2019    MCH 24.6 (L) 04/04/2019    MCHC 28.7 (L) 04/04/2019    RDW 17.5 (H) 04/04/2019     04/04/2019       BMP RESULTS:  Lab Results   Component Value Date     07/09/2019    POTASSIUM 3.8 07/09/2019    CHLORIDE 100 07/09/2019    CO2 30 (H) 07/09/2019    ANIONGAP 11.8 07/09/2019    GLC 93 07/09/2019    BUN 21 07/09/2019    CR 1.49 (H) 07/09/2019    GFRESTIMATED 34 (L) 07/09/2019    GFRESTBLACK 41 (L) 07/09/2019    BESS 10.0 07/09/2019        A1C RESULTS:  Lab Results   Component Value Date    A1C 5.3 01/04/2019       INR RESULTS:  Lab Results   Component Value Date    INR 2.66 (H) 04/04/2019    INR 2.32 (H) 01/29/2019           CC  Alecia Millan MD  [facility]  [address]  [place], [place] " [address]

## 2019-07-10 ENCOUNTER — TELEPHONE (OUTPATIENT)
Dept: CARDIOLOGY | Facility: CLINIC | Age: 79
End: 2019-07-10

## 2019-07-10 LAB
MDC_IDC_LEAD_IMPLANT_DT: NORMAL
MDC_IDC_LEAD_LOCATION: NORMAL
MDC_IDC_LEAD_LOCATION_DETAIL_1: NORMAL
MDC_IDC_LEAD_MFG: NORMAL
MDC_IDC_LEAD_MODEL: NORMAL
MDC_IDC_LEAD_POLARITY_TYPE: NORMAL
MDC_IDC_LEAD_SERIAL: NORMAL
MDC_IDC_MSMT_BATTERY_DTM: NORMAL
MDC_IDC_MSMT_BATTERY_REMAINING_LONGEVITY: 153 MO
MDC_IDC_MSMT_BATTERY_RRT_TRIGGER: 2.62
MDC_IDC_MSMT_BATTERY_STATUS: NORMAL
MDC_IDC_MSMT_BATTERY_VOLTAGE: 3.1 V
MDC_IDC_MSMT_LEADCHNL_RV_IMPEDANCE_VALUE: 380 OHM
MDC_IDC_MSMT_LEADCHNL_RV_IMPEDANCE_VALUE: 437 OHM
MDC_IDC_MSMT_LEADCHNL_RV_PACING_THRESHOLD_AMPLITUDE: 0.5 V
MDC_IDC_MSMT_LEADCHNL_RV_PACING_THRESHOLD_PULSEWIDTH: 0.4 MS
MDC_IDC_MSMT_LEADCHNL_RV_SENSING_INTR_AMPL: 4.75 MV
MDC_IDC_PG_IMPLANT_DTM: NORMAL
MDC_IDC_PG_MFG: NORMAL
MDC_IDC_PG_MODEL: NORMAL
MDC_IDC_PG_SERIAL: NORMAL
MDC_IDC_PG_TYPE: NORMAL
MDC_IDC_SESS_CLINIC_NAME: NORMAL
MDC_IDC_SESS_DTM: NORMAL
MDC_IDC_SESS_TYPE: NORMAL
MDC_IDC_SET_BRADY_HYSTRATE: NORMAL
MDC_IDC_SET_BRADY_LOWRATE: 60 {BEATS}/MIN
MDC_IDC_SET_BRADY_MAX_SENSOR_RATE: 120 {BEATS}/MIN
MDC_IDC_SET_BRADY_MODE: NORMAL
MDC_IDC_SET_LEADCHNL_RV_PACING_AMPLITUDE: 2 V
MDC_IDC_SET_LEADCHNL_RV_PACING_ANODE_ELECTRODE_1: NORMAL
MDC_IDC_SET_LEADCHNL_RV_PACING_ANODE_LOCATION_1: NORMAL
MDC_IDC_SET_LEADCHNL_RV_PACING_CAPTURE_MODE: NORMAL
MDC_IDC_SET_LEADCHNL_RV_PACING_CATHODE_ELECTRODE_1: NORMAL
MDC_IDC_SET_LEADCHNL_RV_PACING_CATHODE_LOCATION_1: NORMAL
MDC_IDC_SET_LEADCHNL_RV_PACING_POLARITY: NORMAL
MDC_IDC_SET_LEADCHNL_RV_PACING_PULSEWIDTH: 0.4 MS
MDC_IDC_SET_LEADCHNL_RV_SENSING_ANODE_ELECTRODE_1: NORMAL
MDC_IDC_SET_LEADCHNL_RV_SENSING_ANODE_LOCATION_1: NORMAL
MDC_IDC_SET_LEADCHNL_RV_SENSING_CATHODE_ELECTRODE_1: NORMAL
MDC_IDC_SET_LEADCHNL_RV_SENSING_CATHODE_LOCATION_1: NORMAL
MDC_IDC_SET_LEADCHNL_RV_SENSING_POLARITY: NORMAL
MDC_IDC_SET_LEADCHNL_RV_SENSING_SENSITIVITY: 0.9 MV
MDC_IDC_SET_ZONE_DETECTION_INTERVAL: 360 MS
MDC_IDC_SET_ZONE_TYPE: NORMAL
MDC_IDC_STAT_BRADY_DTM_END: NORMAL
MDC_IDC_STAT_BRADY_DTM_START: NORMAL
MDC_IDC_STAT_BRADY_RV_PERCENT_PACED: 99.98 %
MDC_IDC_STAT_EPISODE_RECENT_COUNT: 0
MDC_IDC_STAT_EPISODE_RECENT_COUNT: 0
MDC_IDC_STAT_EPISODE_RECENT_COUNT_DTM_END: NORMAL
MDC_IDC_STAT_EPISODE_RECENT_COUNT_DTM_END: NORMAL
MDC_IDC_STAT_EPISODE_RECENT_COUNT_DTM_START: NORMAL
MDC_IDC_STAT_EPISODE_RECENT_COUNT_DTM_START: NORMAL
MDC_IDC_STAT_EPISODE_TOTAL_COUNT: 0
MDC_IDC_STAT_EPISODE_TOTAL_COUNT: 0
MDC_IDC_STAT_EPISODE_TOTAL_COUNT_DTM_END: NORMAL
MDC_IDC_STAT_EPISODE_TOTAL_COUNT_DTM_END: NORMAL
MDC_IDC_STAT_EPISODE_TOTAL_COUNT_DTM_START: NORMAL
MDC_IDC_STAT_EPISODE_TOTAL_COUNT_DTM_START: NORMAL
MDC_IDC_STAT_EPISODE_TYPE: NORMAL

## 2019-07-10 NOTE — TELEPHONE ENCOUNTER
Received call from CT RN informing us that pt's CT scheduled for today is going to be cancelled due to pt's GFR being 34.   Per Dr. Morales's note from 7/1/19:  1-status post repair of ascending aortic dissection which appears to be stable.  Planned by CV surgery to have follow-up CT scan of the aorta sometime in July and I will expedite this.    CT RN wondering when pt should have this rescheduled and if it should be without contrast. Informed her that we will contact the pt to get this rescheduled.     Will route update to Dr. Morales (out this week) and to Angella MARQUEZ RN.

## 2019-07-10 NOTE — TELEPHONE ENCOUNTER
Received message from Angella MARQUEZ RN:  Mckenzie Santos RN Haley, Leandra K, RN   Caller: Unspecified (Today, 10:24 AM)             Please reschedule as soon as her kidney function allows and yes it does need to be with contrast.        Pt has CORE follow up and BMP on 7/19/19- will review GFR at that time to see if CT can be rescheduled. Will send reminder to Team 1's board.

## 2019-07-18 ENCOUNTER — HOSPITAL ENCOUNTER (OUTPATIENT)
Dept: OCCUPATIONAL THERAPY | Facility: CLINIC | Age: 79
Setting detail: THERAPIES SERIES
End: 2019-07-18
Attending: INTERNAL MEDICINE
Payer: COMMERCIAL

## 2019-07-18 DIAGNOSIS — I50.32 CHRONIC DIASTOLIC CONGESTIVE HEART FAILURE (H): ICD-10-CM

## 2019-07-18 DIAGNOSIS — I89.0 LYMPHEDEMA OF BOTH LOWER EXTREMITIES: ICD-10-CM

## 2019-07-18 PROCEDURE — 97167 OT EVAL HIGH COMPLEX 60 MIN: CPT | Mod: GO

## 2019-07-18 PROCEDURE — 97535 SELF CARE MNGMENT TRAINING: CPT | Mod: GO

## 2019-07-18 NOTE — PROGRESS NOTES
[facility]        OUTPATIENT OCCUPATIONAL THERAPY EDEMA EVALUATION  PLAN OF TREATMENT FOR OUTPATIENT REHABILITATION  (COMPLETE FOR INITIAL CLAIMS ONLY)  Patient's Last Name, First Name, Priya Pelletier                           Provider s Name:   [facility] Medical Record No.  [id number]     Start of Care Date:  07/18/19   Onset Date:  07/01/19   Type:  OT   Medical Diagnosis:  lymphedema   Therapy Diagnosis:  lymphedema likely due to cardiac disease Visits from SOC:  1                                     __________________________________________________________________________________   Plan of Treatment/Functional Goals:    Manual lymph drainage, Gradient compression bandaging, Fit for compression garment, Exercises, Precautions to prevent infection / exacerbation, Education, Skin care / precautions, Home management program development  recommend Complete Decongestive Therapy     GOALS  1. Goal description: For decreased risk of infection, skin breakdown/wounds & progressive soft-tissue fibrosis and improved fit of footwear, volume will be reduced by  200 mL in LBK, 150mL RBK.       Target date: 09/18/19  2. Goal description: To reduce volume of lymphedema and risk of soft tissue fibrosis, pt will tolerate up to 23hr/day wear gradient compression bandaging (GCB) of BBK.       Target date: 09/18/19  3. Goal description: For long-term home mgmt chronic lymphedema pt/caregiver independent in home program a. GCB/GCB alternative garment for night wear b. compression garment for day wear c. ex to incr lymph flow/self-MLD.       Target date: 09/18/19  4. Goal description: Pt will independently verbalize the signs/symptoms of lymphedema, precautions and how to obtain a future lymphedema referral if edema persists to preserve skin integrity, prevent infection and  preserve functional mobility.          Target date: 09/18/19              Treatment Frequency: 5x/week   Treatment duration: 2 weeks    Deborah Tyler OT                                    I CERTIFY THE NEED FOR THESE SERVICES FURNISHED UNDER        THIS PLAN OF TREATMENT AND WHILE UNDER MY CARE     (Physician co-signature of this document indicates review and certification of the therapy plan).                   Certification date from: 07/18/19       Certification date to: 10/18/19           Referring physician: Benjamin Morales MD   Initial Assessment  See Epic Evaluation- Start of care: 07/18/19

## 2019-07-18 NOTE — PROGRESS NOTES
"   07/18/19 1500   Quick Adds   Quick Adds Certification   Rehab Discipline   Discipline OT   Type of Visit   Type of visit Initial Edema Evaluation   General Information   Start of care 07/18/19   Referring physician Benjamin Morales MD   Orders Evaluate and treat as indicated   Order date 07/01/19   Medical diagnosis lymphedema   Edema onset 07/01/19   Edema etiology comments extensive cardiac history   Pertinent history of current problem (PT: include personal factors and/or comorbidities that impact the POC; OT: include additional occupational profile info) Patient s/p emergent repair of dissecting aneurysm (Jan. 2019) followed by long-term acutre care due to vent needs, then TCU stay 5 months total.  Patient reports long-term mild swelling exacerbated by recent illness.  Other pertinent PMH includes obesity, HTN, chronic a-fib s/p AV node ablation, anti-coagulated; persistent heart failure, CABG; CT in April showed persisten dissection extending down abdomen.   Surgical / medical history reviewed Yes   Prior level of functional mobility mod I   Prior treatment Compression garments;ALFONSO hose;Elevation;Diuretics   Patient role / employment history Retired   Living environment House / [facility]   Living environment comments lives with spouse   Current assistive devices Front wheeled walker;4 wheeled walker;Standard cane   Fall Risk Screen   Fall screen completed by OT   Have you fallen 2 or more times in the past year? No   Have you fallen and had an injury in the past year? No   Is patient a fall risk? Yes;Department fall risk interventions implemented   System Outcome Measures   Outcome Measures   (patient did not complete)   Subjective Report   Patient report of symptoms \"my legs are much better.  I was swollen all the way up into my thighs.  I've been watching my salt, not eating as much, I've lost about 25#.\"   Precautions   Precautions comments ext. cardiac history, see above; no deep abd.   Pain   Patient " "currently in pain No   Cognitive Status   Orientation Orientation to person, place and time   Level of consciousness Alert   Follows commands and answers questions 100% of the time   Edema Exam / Assessment   Skin condition comments 2+ pitting b/l pre-tibial, 1+ b/l dorsum of feet.  Vascularities present in b/l feet and ankles, mild hemosiderin staining, Stemmer's sign negative, loss of ankle contour b/l. Swelling BBK L >R.   Girth Measurements   Girth Measurements Other  (deferred until CDT initiated)   Activities of Daily Living   Activities of Daily Living mod I, receives assist with IADL, not driving   Transfers   Transfers mod I   Sensory   Sensory perception comments light touch intact   Planned Edema Interventions   Planned edema interventions Manual lymph drainage;Gradient compression bandaging;Fit for compression garment;Exercises;Precautions to prevent infection / exacerbation;Education;Skin care / precautions;Home management program development   Planned edema intervention comments recommend Complete Decongestive Therapy   Clinical Impression   Criteria for skilled therapeutic intervention met Yes   Therapy diagnosis lymphedema likely due to cardiac disease   Influenced by the following impairments / conditions Stage 2;Phlebolymphedema  (fibrosis, 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  Imagine me going to [place] wearing those " "bandages\"   Goal 1   Goal identifier volume   Goal description For decreased risk of infection, skin breakdown/wounds & progressive soft-tissue fibrosis and improved fit of footwear, volume will be reduced by  200 mL in LBK, 150mL RBK.   Target date 09/18/19   Goal 2   Goal identifier GCB   Goal description To reduce volume of lymphedema and risk of soft tissue fibrosis, pt will tolerate up to 23hr/day wear gradient compression bandaging (GCB) of BBK.   Target date 09/18/19   Goal 3   Goal identifier home program   Goal description For long-term home mgmt chronic lymphedema pt/caregiver independent in home program a. GCB/GCB alternative garment for night wear b. compression garment for day wear c. ex to incr lymph flow/self-MLD.   Target date 09/18/19   Goal 4   Goal identifier precautions   Goal description Pt will independently verbalize the signs/symptoms of lymphedema, precautions and how to obtain a future lymphedema referral if edema persists to preserve skin integrity, prevent infection and preserve functional mobility.      Target date 09/18/19   Total Evaluation Time   OT Eval, High Complexity Minutes (96771) 15   Certification   Certification date from 07/18/19   Certification date to 10/18/19   Medical Diagnosis lymphedema     "

## 2019-07-19 ENCOUNTER — OFFICE VISIT (OUTPATIENT)
Dept: CARDIOLOGY | Facility: CLINIC | Age: 79
End: 2019-07-19
Attending: NURSE PRACTITIONER
Payer: COMMERCIAL

## 2019-07-19 VITALS
HEIGHT: 64 IN | OXYGEN SATURATION: 97 % | HEART RATE: 88 BPM | DIASTOLIC BLOOD PRESSURE: 72 MMHG | SYSTOLIC BLOOD PRESSURE: 116 MMHG | WEIGHT: 155 LBS | BODY MASS INDEX: 26.46 KG/M2

## 2019-07-19 DIAGNOSIS — I10 BENIGN ESSENTIAL HYPERTENSION: ICD-10-CM

## 2019-07-19 DIAGNOSIS — I50.32 CHRONIC DIASTOLIC CONGESTIVE HEART FAILURE (H): ICD-10-CM

## 2019-07-19 DIAGNOSIS — I71.010 ASCENDING AORTIC DISSECTION (H): ICD-10-CM

## 2019-07-19 DIAGNOSIS — I48.19 PERSISTENT ATRIAL FIBRILLATION (H): ICD-10-CM

## 2019-07-19 DIAGNOSIS — I50.32 CHRONIC DIASTOLIC CONGESTIVE HEART FAILURE (H): Primary | ICD-10-CM

## 2019-07-19 LAB
ANION GAP SERPL CALCULATED.3IONS-SCNC: 13.3 MMOL/L (ref 6–17)
BUN SERPL-MCNC: 29 MG/DL (ref 7–30)
CALCIUM SERPL-MCNC: 10.1 MG/DL (ref 8.5–10.5)
CHLORIDE SERPL-SCNC: 102 MMOL/L (ref 98–107)
CO2 SERPL-SCNC: 26 MMOL/L (ref 23–29)
CREAT SERPL-MCNC: 1.31 MG/DL (ref 0.7–1.3)
GFR SERPL CREATININE-BSD FRML MDRD: 39 ML/MIN/{1.73_M2}
GLUCOSE SERPL-MCNC: 91 MG/DL (ref 70–105)
POTASSIUM SERPL-SCNC: 4.3 MMOL/L (ref 3.5–5.1)
SODIUM SERPL-SCNC: 137 MMOL/L (ref 136–145)

## 2019-07-19 PROCEDURE — 36415 COLL VENOUS BLD VENIPUNCTURE: CPT | Performed by: INTERNAL MEDICINE

## 2019-07-19 PROCEDURE — 99214 OFFICE O/P EST MOD 30 MIN: CPT | Performed by: NURSE PRACTITIONER

## 2019-07-19 PROCEDURE — 80048 BASIC METABOLIC PNL TOTAL CA: CPT | Performed by: INTERNAL MEDICINE

## 2019-07-19 RX ORDER — TORSEMIDE 20 MG/1
TABLET ORAL
Qty: 45 TABLET | Refills: 3 | Status: SHIPPED | OUTPATIENT
Start: 2019-07-19 | End: 2020-03-19

## 2019-07-19 ASSESSMENT — MIFFLIN-ST. JEOR: SCORE: 1170.14

## 2019-07-19 NOTE — PROGRESS NOTES
Cardiology Clinic Progress Note  Priya Funez MRN# [id number]   YOB: 1940 Age: 78 year old   Primary Cardiologist: Dr. Morales Reason for visit: CORE follow up            Assessment and Plan:   Priya Funez is a very pleasant 78 year old female with a history of HFpEF, atrial fibrillation s/p AV leo ablation with PPM implantation 1/11/2019, emergent aortic dissection repair 1/4/19, hypertension and obesity. Patient here today for CORE follow up.       1.  Chronic diastolic heart failure/HFpEF :  LVEF 60-65%, LV normal size/function, RV mild to moderately dilated, RV systolic function mildly decreased. Patient appears compensated and close to euvolemic, continued improvement in lower extremity edema. Patient has been monitoring weight at home but unable to recall readings, but states weight yesterday was 152# which is down 3# on home scale since last visit. Labs today show improvement in kidney function with decrease in torsemide, creatinine improved from 1.49-> 1.31 today, creatinine at baseline 0.9-1. Electrolytes stable. Will further torsemide today to 10mg daily and have patient follow up closely in CORE clinic.    - NYHA class III, stage C   - Fluid status : close to euvolemic   - Diuretic regimen : DECREASE torsemide 10mg daily   - Aldosterone antagonist : will consider in future if kidney function stable.    - Blood pressure : controlled   - Reinforced HF education provided today, reviewed low sodium diet and when to notify CORE clinic. Advised to call if home weight gets to 155 or higher.    - Lymphedema clinic appt 7/18/19, patient is not interested in wrapping legs.    - Reviewed consideration for WEL program, which she states she will consider.   2. Chronic atrial fibrillation s/p AV node ablation 1/2019 - stable, asymptomatic.    - Continue routine follow up with EP and device clinic   - [id number] score 3 (age, female, HF)   - Continue warfarin for thromboembolic prophylaxis  3.  Emergent aortic dissection repair 1/4/19   - CV surgery recommends repeat CT 6 months after surgery for surveillance - scheduled for 7/10/19, cancelled due to GFR 34, plan to reschedule once kidney function normalizes.   4. Hypertension - controlled, continue current medications  5. Obesity - counseled patient on weight loss strategies.  6. Mild mitral regurgitation  7. Moderate tricuspid regurgitation  8. Acute kidney injury - baseline creatinine 0.9-1, patient noted to have acute kidney injury during hospitalization in January, normalized after discharge and recently noted to have worsening kidney function, likely related to overdiuresis, kidney function has started to improve with decrease in diuretics. Creatinine improved from 1.49-> 1.31 today.       Changes today: DECREASE torsemide to 10mg daily     Follow up plan:     CORE follow up in 2 weeks.           History of Presenting Illness:    Priya Funez is a very pleasant 78 year old female with a history of HFpEF, atrial fibrillation s/p AV leo ablation with PPM implantation 1/11/2019, emergent aortic dissection repair 1/4/19, hypertension and obesity.     Patient presented in January with dissecting aortic aneurysm, she was hospitalized from 1/4/19-1/29/19. Patient had a complicated hospital course. There was prolonged ischemia to the SMA and right renal regions. She required tracheostomy placement and was discharged to [place] on ventilatory support. Patients tracheostomy has been discontinued and she transitioned home the beginning of June.     She was most recently seen by Dr. Morales on 7/1/19 at which point patient was noted to be volume up on exam. Recommended increasing her torsemide to 20mg BID. Lymphedema clinic referral placed and CORE consult ordered. Patient seen for CORE enrollment 7/9/19 at which time her weight was noted to be down 8#, improved lower extremity edema. Labs showed worsening kidney function creatinine 0.97-> 1.49 today. BUN  "21. Torsemide was decreased to 20mg daily.     Echocardiogram 6/26/19 showed LVEF 60-65%, LV normal size/function, RV mild to moderately dilated, RV systolic function mildly decreased, mild MR, moderate tricuspid regurgitation.      Patient is here today for CORE followup.     Patient reports feeling good. Monitoring weights most days at home, unable to recall readings on home scale. Weight at home yesterday 152#, patient notes this is a gradual decline, unable to recall exact readings. Clinic weight today is 155# down 1# since 7/9/19. States lower extremity edema has been controlled, feels it is improving. Wearing compression stockings, picking up a new pair today. Denies abdominal distention/bloating. Denies shortness of breath at rest. Will note occasional exertional dyspnea with activity, \"long walk\". Today walked to clinic, then the cafeteria and back with no dyspnea. Able to complete ADLS and IADLs independently. Denies PND/orthopnea this past week. Energy levels are good. Patient denies chest pain or chest tightness. Denies dizziness, lightheadedness or other presyncopal symptoms. Denies tachycardia or palpitations.     Labs today show improvement in kidney function, creatinine 1.31 which is down from 1.49 7/9/19. Electrolytes stable. Labs in our system show elevated creatinine during time of hospitalization, labs from April and June show creatinine of 0.9. Blood pressure 116/72 and HR 88 in clinic today.    Appetite good. Eating 1/2 meals out and 1/2 meals at home. Not adding additional salt to foods. Typically eating at [employer], [employer], [employer]. Enjoys cottage cheese. Enjoys grilling at home. Typically eating a piece of grilled meat and frozen vegetables. Complete home PT/OT. Walking in the mall 2-3 days per week for activity. Denies alcohol use. Denies tobacco use.         Recent Hospitalizations   1/4/19-1/29/19 r/t dissecting aortic aneurysm, underwent emergent repair 1/4/19. Patient had a complicated " hospital course. There was prolonged ischemia to the SMA and right renal regions. She required tracheostomy placement and was discharged to [place] on ventilatory support.         Social History     55 years, 4 children, 8 grandchildren, enjoys her gardens in the summer.   Social History     Socioeconomic History     Marital status:      Spouse name: Not on file     Number of children: Not on file     Years of education: Not on file     Highest education level: Not on file   Occupational History     Not on file   Social Needs     Financial resource strain: Not on file     Food insecurity:     Worry: Not on file     Inability: Not on file     Transportation needs:     Medical: Not on file     Non-medical: Not on file   Tobacco Use     Smoking status: Never Smoker     Smokeless tobacco: Never Used     Tobacco comment: hisband   Substance and Sexual Activity     Alcohol use: No     Frequency: Never     Comment: no alcohol 7/9/19     Drug use: No     Sexual activity: Not on file   Lifestyle     Physical activity:     Days per week: Not on file     Minutes per session: Not on file     Stress: Not on file   Relationships     Social connections:     Talks on phone: Not on file     Gets together: Not on file     Attends Faith service: Not on file     Active member of club or organization: Not on file     Attends meetings of clubs or organizations: Not on file     Relationship status: Not on file     Intimate partner violence:     Fear of current or ex partner: Not on file     Emotionally abused: Not on file     Physically abused: Not on file     Forced sexual activity: Not on file   Other Topics Concern     Parent/sibling w/ CABG, MI or angioplasty before 65F 55M? Not Asked   Social History Narrative     Not on file            Review of Systems:   Skin:  Negative     Eyes:  Negative    ENT:  Negative    Respiratory:  Positive for dyspnea on exertion  Cardiovascular:  Negative for;palpitations;chest pain  "edema;Positive for;exercise intolerance;fatigue  Gastroenterology: Negative for melena;hematochezia  Genitourinary:  Negative    Musculoskeletal:  Positive for arthritis;joint pain  Neurologic:  Negative    Psychiatric:  Positive for anxiety;depression  Heme/Lymph/Imm:  Negative    Endocrine:  Negative           Physical Exam:   Vitals: /72   Pulse 88   Ht 1.613 m (5' 3.5\")   Wt 70.3 kg (155 lb)   SpO2 97%   BMI 27.03 kg/m     Wt Readings from Last 4 Encounters:   07/19/19 70.3 kg (155 lb)   07/09/19 70.8 kg (156 lb)   07/09/19 70.8 kg (156 lb)   07/01/19 74.6 kg (164 lb 8 oz)     GEN: well nourished, in no acute distress.  HEENT:  Pupils equal, round. Sclerae nonicteric.   NECK: Supple, no masses appreciated. JVD slightly elevated  C/V:  Regular rate and rhythm, no murmur, rub or gallop.    RESP: Respirations are unlabored. Clear to auscultation bilaterally without wheezing, rales, or rhonchi.  GI: Abdomen soft, nontender.  EXTREM: Bilateral trace to +1 LE edema, pretibial, to approximately mid shin. Slight improvement in edema since last visit. Venous stasis pigment changes present.   NEURO: Alert and oriented, cooperative.  SKIN: Warm and dry.        Data:   ECHO 6/26/19  The left ventricle is normal in size.  Left ventricular systolic function is normal.  The visual ejection fraction is estimated at 60-65%.  Normal left ventricular wall motion  The right ventricle is mild to moderately dilated.  Mildly decreased right ventricular systolic function  There is mild (1+) mitral regurgitation.  There is moderate (2+) tricuspid regurgitation.  IVC diameter and respiratory changes fall into an intermediate range  suggesting an RA pressure of 8 mmHg.  Right ventricular systolic pressure is elevated, consistent with mild  pulmonary hypertension.     Since the previous study, the RV now appears larger with mildly decreased  function. TR has increased.    LIPID RESULTS:  Lab Results   Component Value Date    " TRIG 96 01/28/2019     LIVER ENZYME RESULTS:  Lab Results   Component Value Date    AST 27 04/04/2019    ALT 19 04/04/2019     CBC RESULTS:  Lab Results   Component Value Date    WBC 6.7 04/04/2019    RBC 3.70 (L) 04/04/2019    HGB 9.1 (L) 04/04/2019    HCT 31.7 (L) 04/04/2019    MCV 86 04/04/2019    MCH 24.6 (L) 04/04/2019    MCHC 28.7 (L) 04/04/2019    RDW 17.5 (H) 04/04/2019     04/04/2019     BMP RESULTS:  Lab Results   Component Value Date     07/19/2019    POTASSIUM 4.3 07/19/2019    CHLORIDE 102 07/19/2019    CO2 26 07/19/2019    ANIONGAP 13.3 07/19/2019    GLC 91 07/19/2019    BUN 29 07/19/2019    CR 1.31 (H) 07/19/2019    GFRESTIMATED 39 (L) 07/19/2019    GFRESTBLACK 48 (L) 07/19/2019    BESS 10.1 07/19/2019      A1C RESULTS:  Lab Results   Component Value Date    A1C 5.3 01/04/2019     INR RESULTS:  Lab Results   Component Value Date    INR 2.66 (H) 04/04/2019    INR 2.32 (H) 01/29/2019            Medications     Current Outpatient Medications   Medication Sig Dispense Refill     acetaminophen (TYLENOL) 325 MG tablet Take 2 tablets (650 mg) by mouth every 4 hours as needed for other (multimodal surgical pain management along with NSAIDS and opioid medication as indicated based on pain control and physical function.)       aspirin (ASA) 81 MG EC tablet Take 81 mg by mouth every 24 hours       cholecalciferol (VITAMIN D-1000 MAX ST) 1000 units TABS Take 1,000 Units by mouth daily        cyanocobalamin (VITAMIN B-12) 100 MCG tablet Take 2,000 mcg by mouth daily       escitalopram (LEXAPRO) 10 MG tablet Take 10 mg by mouth daily       metoprolol tartrate (LOPRESSOR) 25 MG tablet Take 25 mg by mouth 2 times daily       mirtazapine (REMERON) 15 MG tablet Take 15 mg by mouth       potassium chloride (KLOR-CON) 20 MEQ packet Take 20 mEq by mouth       torsemide (DEMADEX) 20 MG tablet Take 1/2 tablet daily = 10mg daily 45 tablet 3     warfarin (COUMADIN) 2 MG tablet Take 1 tablet (2 mg) by mouth daily  (Patient taking differently: Take 3 mg by mouth daily )       Warfarin Therapy Reminder 1 each continuous prn            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, s/p Replacement of the ascending aorta with a 34 mm Hemashield branch graft 1/4/19     Hypertension      Persistent atrial fibrillation (H)     post-op 1/2019: AV node ablation and single chamber pacemaker implanted 1/11/2019     Pleural effusion, bilateral     s/p left thoracentesis of 150cc 1/11/19;  right thoracentesis of 400cc 1/24/2019     Past Surgical History:   Procedure Laterality Date     ANGIOGRAM Right 1/4/2019    Procedure: DIAGONSTIC ANGIOGRAM OF SMA;  Surgeon: Rigo Jennings MD;  Location:  OR     BYPASS GRAFT ARTERY CORONARY, REPAIR VALVE AORTIC, COMBINED N/A 1/4/2019    Procedure: AORTIC DISSECTION REPAIR WITH GRAFT- HEMASHIELD PLATINUM WOVEN DOUBLE VELOR 4 SIDE ARM VASCULAR GRAFT, D:35 X 12 X 10 X 10MM/ L:50CM;  Surgeon: Jose Winslow MD;  Location:  OR     EP ABLATION AV NODE N/A 1/11/2019    Procedure: EP Ablation AV Node;  Surgeon: Tsering Davey MD;  Location:  HEART CARDIAC CATH LAB     EP PACEMAKER Left 1/11/2019    Procedure: EP Pacemaker;  Surgeon: Tsering Davey MD;  Location:  HEART CARDIAC CATH LAB     TRACHEOSTOMY N/A 1/25/2019    Procedure: TRACHEOSTOMY  (DR MCCLELLAND);  Surgeon: Bryson Mcclelland MD;  Location:  OR            Allergies   Amoxicillin and Ciprofloxacin        TAMRA Ridley [facility]  Pager: [phone]

## 2019-07-19 NOTE — PATIENT INSTRUCTIONS
Call CORE nurse for any questions or concerns Mon-Fri 8am-4pm:                           [phone]             For concerns after hours:                             [phone]     Medication changes: DECREASE torsemide to 10mg daily = 1/2 tablet  Plan from today: follow up with Xochitl in 2 weeks with labs prior, continue to monitor weight, if weight > 155 pounds call clinic.   Lab results: see attached; kidney function improved with decreased water pill, still above baseline.   Component      Latest Ref Rng & Units 7/9/2019 7/19/2019   Sodium      136 - 145 mmol/L 138 137   Potassium      3.5 - 5.1 mmol/L 3.8 4.3   Chloride      98 - 107 mmol/L 100 102   Carbon Dioxide      23 - 29 mmol/L 30 (H) 26   Anion Gap      6 - 17 mmol/L 11.8 13.3   Glucose      70 - 105 mg/dL 93 91   Urea Nitrogen      7 - 30 mg/dL 21 29   Creatinine      0.70 - 1.30 mg/dL 1.49 (H) 1.31 (H)   GFR Estimate      >60 mL/min/1.73:m2 34 (L) 39 (L)   GFR Estimate If Black      >60 mL/min/1.73:m2 41 (L) 48 (L)   Calcium      8.5 - 10.5 mg/dL 10.0 10.1

## 2019-07-19 NOTE — LETTER
7/19/2019    Alecia Millan MD, MD  [address]  [facility] [address]    RE: Priya Funez       Dear Colleague,    I had the pleasure of seeing Priya Funez in the [facility].    Cardiology Clinic Progress Note  Priya Funez MRN# [id number]   YOB: 1940 Age: 78 year old   Primary Cardiologist: Dr. Morales Reason for visit: CORE follow up            Assessment and Plan:   Priya Funez is a very pleasant 78 year old female with a history of HFpEF, atrial fibrillation s/p AV leo ablation with PPM implantation 1/11/2019, emergent aortic dissection repair 1/4/19, hypertension and obesity. Patient here today for CORE follow up.       1.  Chronic diastolic heart failure/HFpEF :  LVEF 60-65%, LV normal size/function, RV mild to moderately dilated, RV systolic function mildly decreased. Patient appears compensated and close to euvolemic, continued improvement in lower extremity edema. Patient has been monitoring weight at home but unable to recall readings, but states weight yesterday was 152# which is down 3# on home scale since last visit. Labs today show improvement in kidney function with decrease in torsemide, creatinine improved from 1.49-> 1.31 today, creatinine at baseline 0.9-1. Electrolytes stable. Will further torsemide today to 10mg daily and have patient follow up closely in CORE clinic.    - NYHA class III, stage C   - Fluid status : close to euvolemic   - Diuretic regimen : DECREASE torsemide 10mg daily   - Aldosterone antagonist : will consider in future if kidney function stable.    - Blood pressure : controlled   - Reinforced HF education provided today, reviewed low sodium diet and when to notify CORE clinic. Advised to call if home weight gets to 155 or higher.    - Lymphedema clinic appt 7/18/19, patient is not interested in wrapping legs.    - Reviewed consideration for WEL program, which she states she will consider.   2.  Chronic atrial fibrillation s/p AV node ablation 1/2019 - stable, asymptomatic.    - Continue routine follow up with EP and device clinic   - [id number] score 3 (age, female, HF)   - Continue warfarin for thromboembolic prophylaxis  3. Emergent aortic dissection repair 1/4/19   - CV surgery recommends repeat CT 6 months after surgery for surveillance - scheduled for 7/10/19, cancelled due to GFR 34, plan to reschedule once kidney function normalizes.   4. Hypertension - controlled, continue current medications  5. Obesity - counseled patient on weight loss strategies.  6. Mild mitral regurgitation  7. Moderate tricuspid regurgitation  8. Acute kidney injury - baseline creatinine 0.9-1, patient noted to have acute kidney injury during hospitalization in January, normalized after discharge and recently noted to have worsening kidney function, likely related to overdiuresis, kidney function has started to improve with decrease in diuretics. Creatinine improved from 1.49-> 1.31 today.       Changes today: DECREASE torsemide to 10mg daily     Follow up plan:     CORE follow up in 2 weeks.           History of Presenting Illness:    Priya Funez is a very pleasant 78 year old female with a history of HFpEF, atrial fibrillation s/p AV leo ablation with PPM implantation 1/11/2019, emergent aortic dissection repair 1/4/19, hypertension and obesity.     Patient presented in January with dissecting aortic aneurysm, she was hospitalized from 1/4/19-1/29/19. Patient had a complicated hospital course. There was prolonged ischemia to the SMA and right renal regions. She required tracheostomy placement and was discharged to [place] on ventilatory support. Patients tracheostomy has been discontinued and she transitioned home the beginning of June.     She was most recently seen by Dr. Morales on 7/1/19 at which point patient was noted to be volume up on exam. Recommended increasing her torsemide to 20mg BID. Lymphedema  "clinic referral placed and CORE consult ordered. Patient seen for CORE enrollment 7/9/19 at which time her weight was noted to be down 8#, improved lower extremity edema. Labs showed worsening kidney function creatinine 0.97-> 1.49 today. BUN 21. Torsemide was decreased to 20mg daily.     Echocardiogram 6/26/19 showed LVEF 60-65%, LV normal size/function, RV mild to moderately dilated, RV systolic function mildly decreased, mild MR, moderate tricuspid regurgitation.      Patient is here today for CORE followup.     Patient reports feeling good. Monitoring weights most days at home, unable to recall readings on home scale. Weight at home yesterday 152#, patient notes this is a gradual decline, unable to recall exact readings. Clinic weight today is 155# down 1# since 7/9/19. States lower extremity edema has been controlled, feels it is improving. Wearing compression stockings, picking up a new pair today. Denies abdominal distention/bloating. Denies shortness of breath at rest. Will note occasional exertional dyspnea with activity, \"long walk\". Today walked to clinic, then the cafeteria and back with no dyspnea. Able to complete ADLS and IADLs independently. Denies PND/orthopnea this past week. Energy levels are good. Patient denies chest pain or chest tightness. Denies dizziness, lightheadedness or other presyncopal symptoms. Denies tachycardia or palpitations.     Labs today show improvement in kidney function, creatinine 1.31 which is down from 1.49 7/9/19. Electrolytes stable. Labs in our system show elevated creatinine during time of hospitalization, labs from April and June show creatinine of 0.9. Blood pressure 116/72 and HR 88 in clinic today.    Appetite good. Eating 1/2 meals out and 1/2 meals at home. Not adding additional salt to foods. Typically eating at [employer], [employer], [employer]. Enjoys cottage cheese. Enjoys grilling at home. Typically eating a piece of grilled meat and frozen vegetables. Complete " home PT/OT. Walking in the mall 2-3 days per week for activity. Denies alcohol use. Denies tobacco use.         Recent Hospitalizations   1/4/19-1/29/19 r/t dissecting aortic aneurysm, underwent emergent repair 1/4/19. Patient had a complicated hospital course. There was prolonged ischemia to the SMA and right renal regions. She required tracheostomy placement and was discharged to [place] on ventilatory support.         Social History     55 years, 4 children, 8 grandchildren, enjoys her gardens in the summer.   Social History     Socioeconomic History     Marital status:      Spouse name: Not on file     Number of children: Not on file     Years of education: Not on file     Highest education level: Not on file   Occupational History     Not on file   Social Needs     Financial resource strain: Not on file     Food insecurity:     Worry: Not on file     Inability: Not on file     Transportation needs:     Medical: Not on file     Non-medical: Not on file   Tobacco Use     Smoking status: Never Smoker     Smokeless tobacco: Never Used     Tobacco comment: hisband   Substance and Sexual Activity     Alcohol use: No     Frequency: Never     Comment: no alcohol 7/9/19     Drug use: No     Sexual activity: Not on file   Lifestyle     Physical activity:     Days per week: Not on file     Minutes per session: Not on file     Stress: Not on file   Relationships     Social connections:     Talks on phone: Not on file     Gets together: Not on file     Attends Spiritism service: Not on file     Active member of club or organization: Not on file     Attends meetings of clubs or organizations: Not on file     Relationship status: Not on file     Intimate partner violence:     Fear of current or ex partner: Not on file     Emotionally abused: Not on file     Physically abused: Not on file     Forced sexual activity: Not on file   Other Topics Concern     Parent/sibling w/ CABG, MI or angioplasty before 65F 55M?  "Not Asked   Social History Narrative     Not on file            Review of Systems:   Skin:  Negative     Eyes:  Negative    ENT:  Negative    Respiratory:  Positive for dyspnea on exertion  Cardiovascular:  Negative for;palpitations;chest pain edema;Positive for;exercise intolerance;fatigue  Gastroenterology: Negative for melena;hematochezia  Genitourinary:  Negative    Musculoskeletal:  Positive for arthritis;joint pain  Neurologic:  Negative    Psychiatric:  Positive for anxiety;depression  Heme/Lymph/Imm:  Negative    Endocrine:  Negative           Physical Exam:   Vitals: /72   Pulse 88   Ht 1.613 m (5' 3.5\")   Wt 70.3 kg (155 lb)   SpO2 97%   BMI 27.03 kg/m      Wt Readings from Last 4 Encounters:   07/19/19 70.3 kg (155 lb)   07/09/19 70.8 kg (156 lb)   07/09/19 70.8 kg (156 lb)   07/01/19 74.6 kg (164 lb 8 oz)     GEN: well nourished, in no acute distress.  HEENT:  Pupils equal, round. Sclerae nonicteric.   NECK: Supple, no masses appreciated. JVD slightly elevated  C/V:  Regular rate and rhythm, no murmur, rub or gallop.    RESP: Respirations are unlabored. Clear to auscultation bilaterally without wheezing, rales, or rhonchi.  GI: Abdomen soft, nontender.  EXTREM: Bilateral trace to +1 LE edema, pretibial, to approximately mid shin. Slight improvement in edema since last visit. Venous stasis pigment changes present.   NEURO: Alert and oriented, cooperative.  SKIN: Warm and dry.        Data:   ECHO 6/26/19  The left ventricle is normal in size.  Left ventricular systolic function is normal.  The visual ejection fraction is estimated at 60-65%.  Normal left ventricular wall motion  The right ventricle is mild to moderately dilated.  Mildly decreased right ventricular systolic function  There is mild (1+) mitral regurgitation.  There is moderate (2+) tricuspid regurgitation.  IVC diameter and respiratory changes fall into an intermediate range  suggesting an RA pressure of 8 mmHg.  Right ventricular " systolic pressure is elevated, consistent with mild  pulmonary hypertension.     Since the previous study, the RV now appears larger with mildly decreased  function. TR has increased.    LIPID RESULTS:  Lab Results   Component Value Date    TRIG 96 01/28/2019     LIVER ENZYME RESULTS:  Lab Results   Component Value Date    AST 27 04/04/2019    ALT 19 04/04/2019     CBC RESULTS:  Lab Results   Component Value Date    WBC 6.7 04/04/2019    RBC 3.70 (L) 04/04/2019    HGB 9.1 (L) 04/04/2019    HCT 31.7 (L) 04/04/2019    MCV 86 04/04/2019    MCH 24.6 (L) 04/04/2019    MCHC 28.7 (L) 04/04/2019    RDW 17.5 (H) 04/04/2019     04/04/2019     BMP RESULTS:  Lab Results   Component Value Date     07/19/2019    POTASSIUM 4.3 07/19/2019    CHLORIDE 102 07/19/2019    CO2 26 07/19/2019    ANIONGAP 13.3 07/19/2019    GLC 91 07/19/2019    BUN 29 07/19/2019    CR 1.31 (H) 07/19/2019    GFRESTIMATED 39 (L) 07/19/2019    GFRESTBLACK 48 (L) 07/19/2019    BESS 10.1 07/19/2019      A1C RESULTS:  Lab Results   Component Value Date    A1C 5.3 01/04/2019     INR RESULTS:  Lab Results   Component Value Date    INR 2.66 (H) 04/04/2019    INR 2.32 (H) 01/29/2019            Medications     Current Outpatient Medications   Medication Sig Dispense Refill     acetaminophen (TYLENOL) 325 MG tablet Take 2 tablets (650 mg) by mouth every 4 hours as needed for other (multimodal surgical pain management along with NSAIDS and opioid medication as indicated based on pain control and physical function.)       aspirin (ASA) 81 MG EC tablet Take 81 mg by mouth every 24 hours       cholecalciferol (VITAMIN D-1000 MAX ST) 1000 units TABS Take 1,000 Units by mouth daily        cyanocobalamin (VITAMIN B-12) 100 MCG tablet Take 2,000 mcg by mouth daily       escitalopram (LEXAPRO) 10 MG tablet Take 10 mg by mouth daily       metoprolol tartrate (LOPRESSOR) 25 MG tablet Take 25 mg by mouth 2 times daily       mirtazapine (REMERON) 15 MG tablet Take 15  mg by mouth       potassium chloride (KLOR-CON) 20 MEQ packet Take 20 mEq by mouth       torsemide (DEMADEX) 20 MG tablet Take 1/2 tablet daily = 10mg daily 45 tablet 3     warfarin (COUMADIN) 2 MG tablet Take 1 tablet (2 mg) by mouth daily (Patient taking differently: Take 3 mg by mouth daily )       Warfarin Therapy Reminder 1 each continuous prn            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, s/p Replacement of the ascending aorta with a 34 mm Hemashield branch graft 1/4/19     Hypertension      Persistent atrial fibrillation (H)     post-op 1/2019: AV node ablation and single chamber pacemaker implanted 1/11/2019     Pleural effusion, bilateral     s/p left thoracentesis of 150cc 1/11/19;  right thoracentesis of 400cc 1/24/2019     Past Surgical History:   Procedure Laterality Date     ANGIOGRAM Right 1/4/2019    Procedure: DIAGONSTIC ANGIOGRAM OF SMA;  Surgeon: Rigo Jennings MD;  Location:  OR     BYPASS GRAFT ARTERY CORONARY, REPAIR VALVE AORTIC, COMBINED N/A 1/4/2019    Procedure: AORTIC DISSECTION REPAIR WITH GRAFT- HEMASHIELD PLATINUM WOVEN DOUBLE VELOR 4 SIDE ARM VASCULAR GRAFT, D:35 X 12 X 10 X 10MM/ L:50CM;  Surgeon: Jose Winslow MD;  Location:  OR     EP ABLATION AV NODE N/A 1/11/2019    Procedure: EP Ablation AV Node;  Surgeon: Tsering Davey MD;  Location:  HEART CARDIAC CATH LAB     EP PACEMAKER Left 1/11/2019    Procedure: EP Pacemaker;  Surgeon: Tsering Davey MD;  Location:  HEART CARDIAC CATH LAB     TRACHEOSTOMY N/A 1/25/2019    Procedure: TRACHEOSTOMY  (DR MCCLELLAND);  Surgeon: Bryson Mcclelland MD;  Location:  OR            Allergies   Amoxicillin and Ciprofloxacin        TAMRA Ridley CNP  [facility]  Pager: [phone]      Thank you for allowing me to participate in the care of your patient.      Sincerely,     TAMRA Ridley CNP     [facility]  [facility]

## 2019-07-30 ENCOUNTER — OFFICE VISIT (OUTPATIENT)
Dept: OTOLARYNGOLOGY | Facility: CLINIC | Age: 79
End: 2019-07-30
Payer: COMMERCIAL

## 2019-07-30 VITALS — HEIGHT: 64 IN | WEIGHT: 155 LBS | BODY MASS INDEX: 26.46 KG/M2

## 2019-07-30 DIAGNOSIS — R49.0 DYSPHONIA: ICD-10-CM

## 2019-07-30 DIAGNOSIS — Z93.0 TRACHEOSTOMY STATUS (H): Primary | ICD-10-CM

## 2019-07-30 ASSESSMENT — MIFFLIN-ST. JEOR: SCORE: 1170.14

## 2019-07-30 NOTE — PATIENT INSTRUCTIONS
1. You were seen in the ENT Clinic today by .  If you have any questions or concerns after your appointment, please call   - Option 1: ENT Clinic: [phone]  - Option 2: Lucie ('s Nurse): [phone]    2.   Plan to return to clinic as needed.     KIKE Faulkner  [facility]- Otolaryngology  [phone]

## 2019-07-30 NOTE — LETTER
7/30/2019       RE: Priya Funez  [address]  [facility] [address]     Dear Colleague,    Thank you for referring your patient, Priya Funez, to the [facility] EAR NOSE AND THROAT at [facility]. Please see a copy of my visit note below.      HISTORY OF PRESENT ILLNESS: Priya Funez is a 78 year old female with a history of an ascending aortic dissection and shortness of breath.  She had a replacement of the ascending aorta with a 34 mm graft aortic valve on January 4, 2019.  She also had an AV node ablation and permanent pacemaker placed on 1/11/2019.  She developed acute respiratory failure on 1/13/2019 despite attempts at optimization of BiPAP and high flow oxygen.  She was eventually treated with a tracheostomy on 1/25/2019.  She was referred to [place] for rehab.  I saw her on 3/26/2019 she had a 5.0 Portex/Bivona tight to shaft tracheostomy tube she was interested in decannulation.  Her airway on examination appeared excellent at that time.  I recommended plugging and a decannulation.  She  should return to clinic on 5/30/2019 after being plugged around the clock without any difficulties for the last 20 days.  She had no airway symptoms no voice symptoms and stable swallowing.  The tracheostomy tube was removed that day.  She returns today for a wound check.    She has been doing well since decannulation.  She does note that occasional she will have mild inspiratory stridor but this happens rarely.  His most recently happened 1 month ago.  Has not really happened since.  Her  notes that she used to have some inspiratory stridor at night but this has resolved ever since decannulation.  She feels she sleeps on side of her bed correction on her side while asleep more often than she did in the past.  She has minimal swallowing problems and usually a small pill will get stuck but she resolves this with a a cracker and some liquids.  She also has  vocal fatigue at the end of the day.  She feels these problems are minor for her.    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, s/p Replacement of the ascending aorta with a 34 mm Hemashield branch graft 1/4/19     Heart failure (H)      Hypertension      Persistent atrial fibrillation (H)     post-op 1/2019: AV node ablation and single chamber pacemaker implanted 1/11/2019     Pleural effusion, bilateral     s/p left thoracentesis of 150cc 1/11/19;  right thoracentesis of 400cc 1/24/2019       PAST SURGICAL HISTORY:   Past Surgical History:   Procedure Laterality Date     ANGIOGRAM Right 1/4/2019    Procedure: DIAGONSTIC ANGIOGRAM OF SMA;  Surgeon: Rigo Jennings MD;  Location:  OR     BYPASS GRAFT ARTERY CORONARY, REPAIR VALVE AORTIC, COMBINED N/A 1/4/2019    Procedure: AORTIC DISSECTION REPAIR WITH GRAFT- HEMASHIELD PLATINUM WOVEN DOUBLE VELOR 4 SIDE ARM VASCULAR GRAFT, D:35 X 12 X 10 X 10MM/ L:50CM;  Surgeon: Jose Winslow MD;  Location:  OR     EP ABLATION AV NODE N/A 1/11/2019    Procedure: EP Ablation AV Node;  Surgeon: Tsering Davey MD;  Location:  HEART CARDIAC CATH LAB     EP PACEMAKER Left 1/11/2019    Procedure: EP Pacemaker;  Surgeon: Tsering Davey MD;  Location:  HEART CARDIAC CATH LAB     TRACHEOSTOMY N/A 1/25/2019    Procedure: TRACHEOSTOMY  (DR MCCLELLAND);  Surgeon: Bryson Mcclelland MD;  Location:  OR     Family History      Medical History Relation Name Comments   Good Health Brother       Heart Disease Father       Other Mother ataxia     Good Health Sister       Diabetes  Son         Relation Name Status Comments   Brother   Alive     Daughter   Alive     Father        Mother ataxia Alive     Sister   Alive     Sister   Alive     Son   Alive     Son   Alive     Son   Alive        SOCIAL HISTORY:   Social History     Tobacco Use     Smoking status: Never Smoker     Smokeless tobacco: Never Used     Tobacco comment: hisband   Substance Use Topics     Alcohol use: No     Frequency: Never     Comment: no alcohol 19       REVIEW OF SYSTEMS: Ten point review of systems was performed and is negative except for:   [facility] ROS 3/26/2019   Ears, Nose, Throat Trouble swallowing   Cardiopulmonary Cough, Breathing problems   Endocrine Frequent urination        ALLERGIES: Amoxicillin and Ciprofloxacin    MEDICATIONS:   Current Outpatient Medications   Medication Sig Dispense Refill     aspirin (ASA) 81 MG EC tablet Take 81 mg by mouth every 24 hours       cholecalciferol (VITAMIN D-1000 MAX ST) 1000 units TABS Take 1,000 Units by mouth daily        cyanocobalamin (VITAMIN B-12) 100 MCG tablet Take 2,000 mcg by mouth daily       escitalopram (LEXAPRO) 10 MG tablet Take 10 mg by mouth daily       metoprolol tartrate (LOPRESSOR) 25 MG tablet Take 25 mg by mouth 2 times daily       mirtazapine (REMERON) 15 MG tablet Take 15 mg by mouth       potassium chloride (KLOR-CON) 20 MEQ packet Take 20 mEq by mouth       torsemide (DEMADEX) 20 MG tablet Take 1/2 tablet daily = 10mg daily 45 tablet 3     warfarin (COUMADIN) 2 MG tablet Take 1 tablet (2 mg) by mouth daily (Patient taking differently: Take 3 mg by mouth daily )       Warfarin Therapy Reminder 1 each continuous prn           PHYSICAL EXAMINATION:  She  is awake, alert and in no apparent distress.    Her tympanic membranes are clear and intact bilaterally. External auditory canals are clear.  Nasal exam shows a mild septal deviation without obstruction.  Examination of the oral cavity shows no suspicious lesions.  There is symmetric  movement of the tongue and soft palate.    The oropharynx is clear.  Her neck is supple without significant adenopathy.  The tracheostomy site is healing well.  A complete seal is present.  Pulse is regular.  Upper airway is clear.  Cranial nerves II-XII are grossly intact.      ROCEDURE: Due to her symptoms of mild swallowing and intermittent stridor a flexible laryngoscopy  was performed.  Informed consent was obtained and a time out was performed. 3% lidocaine and 0.25% phenylephrine was sprayed into the nasal cavity and allowed 3 to 5 minutes for effect. The scope was passed through the right sided nostril. Examination showed the vocal folds to be mobile and meet in the midline.  No nodules, polyps or ulcerations are seen.  There is mild inflammation or erythema of the supraglottic or glottic larynx.  With phonation there is moderate contraction of the supraglottic larynx.  The hypopharynx is otherwise clear as is the subglottis.        Repiration           Phonation        IMPRESSION/PLAN: She is doing well after decannulation .  Her airway and voice and swallowing symptoms are for her at this time.  I did offer her a trial of speech therapy for each of these but she would like to hold off for now.  I have asked her to call us should she change her mind and   would like to have additional intervention.        Again, thank you for allowing me to participate in the care of your patient.      Sincerely,    Valentin Leger MD

## 2019-07-30 NOTE — PROGRESS NOTES
HISTORY OF PRESENT ILLNESS: Priya Funez is a 78 year old female with a history of an ascending aortic dissection and shortness of breath.  She had a replacement of the ascending aorta with a 34 mm graft aortic valve on January 4, 2019.  She also had an AV node ablation and permanent pacemaker placed on 1/11/2019.  She developed acute respiratory failure on 1/13/2019 despite attempts at optimization of BiPAP and high flow oxygen.  She was eventually treated with a tracheostomy on 1/25/2019.  She was referred to [facility] for rehab.  I saw her on 3/26/2019 she had a 5.0 Portex/Bivona tight to shaft tracheostomy tube she was interested in decannulation.  Her airway on examination appeared excellent at that time.  I recommended plugging and a decannulation.  She should return to clinic on 5/30/2019 after being plugged around the clock without any difficulties for the last 20 days.  She had no airway symptoms no voice symptoms and stable swallowing.  The tracheostomy tube was removed that day.  She returns today for a wound check.    She has been doing well since decannulation.  She does note that occasional she will have mild inspiratory stridor but this happens rarely.  His most recently happened 1 month ago.  Has not really happened since.  Her  notes that she used to have some inspiratory stridor at night but this has resolved ever since decannulation.  She feels she sleeps on side of her bed correction on her side while asleep more often than she did in the past.  She has minimal swallowing problems and usually a small pill will get stuck but she resolves this with a a cracker and some liquids.  She also has vocal fatigue at the end of the day.  She feels these problems are minor for her.    Last 2 Scores for Patient-Answered VHI Questionnaire  VHI Total Score 3/26/2019   VHI Total Score 1       Last 2 Scores for Patient-Answered CSI Questionnaire  CSI Total Score 3/26/2019   CSI Total Score 2          Last 2 Scores for Patient-Answered EAT Questionnaire  EAT Total Score 3/26/2019   EAT Total Score 18           PAST MEDICAL HISTORY:   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, s/p Replacement of the ascending aorta with a 34 mm Hemashield branch graft 19     Heart failure (H)      Hypertension      Persistent atrial fibrillation (H)     post-op 2019: AV node ablation and single chamber pacemaker implanted 2019     Pleural effusion, bilateral     s/p left thoracentesis of 150cc 19;  right thoracentesis of 400cc 2019       PAST SURGICAL HISTORY:   Past Surgical History:   Procedure Laterality Date     ANGIOGRAM Right 2019    Procedure: DIAGONSTIC ANGIOGRAM OF SMA;  Surgeon: Rigo Jennings MD;  Location:  OR     BYPASS GRAFT ARTERY CORONARY, REPAIR VALVE AORTIC, COMBINED N/A 2019    Procedure: AORTIC DISSECTION REPAIR WITH GRAFT- HEMASHIELD PLATINUM WOVEN DOUBLE VELOR 4 SIDE ARM VASCULAR GRAFT, D:35 X 12 X 10 X 10MM/ L:50CM;  Surgeon: Jose Winslow MD;  Location:  OR     EP ABLATION AV NODE N/A 2019    Procedure: EP Ablation AV Node;  Surgeon: Tsering Davey MD;  Location:  HEART CARDIAC CATH LAB     EP PACEMAKER Left 2019    Procedure: EP Pacemaker;  Surgeon: Tsering Davey MD;  Location:  HEART CARDIAC CATH LAB     TRACHEOSTOMY N/A 2019    Procedure: TRACHEOSTOMY  (DR MCCLELLAND);  Surgeon: Bryson Mcclelland MD;  Location:  OR     Family History      Medical History Relation Name Comments   Good Health Brother       Heart Disease Father       Other Mother ataxia     Good Health Sister       Diabetes Son         Relation Name Status Comments   Brother   Alive     Daughter   Alive     Father        Mother ataxia Alive     Sister   Alive     Sister   Alive     Son   Alive     Son   Alive     Son   Alive        SOCIAL HISTORY:   Social History     Tobacco Use     Smoking status:  Never Smoker     Smokeless tobacco: Never Used     Tobacco comment: hisband   Substance Use Topics     Alcohol use: No     Frequency: Never     Comment: no alcohol 7/9/19       REVIEW OF SYSTEMS: Ten point review of systems was performed and is negative except for:   [facility] ROS 3/26/2019   Ears, Nose, Throat Trouble swallowing   Cardiopulmonary Cough, Breathing problems   Endocrine Frequent urination        ALLERGIES: Amoxicillin and Ciprofloxacin    MEDICATIONS:   Current Outpatient Medications   Medication Sig Dispense Refill     aspirin (ASA) 81 MG EC tablet Take 81 mg by mouth every 24 hours       cholecalciferol (VITAMIN D-1000 MAX ST) 1000 units TABS Take 1,000 Units by mouth daily        cyanocobalamin (VITAMIN B-12) 100 MCG tablet Take 2,000 mcg by mouth daily       escitalopram (LEXAPRO) 10 MG tablet Take 10 mg by mouth daily       metoprolol tartrate (LOPRESSOR) 25 MG tablet Take 25 mg by mouth 2 times daily       mirtazapine (REMERON) 15 MG tablet Take 15 mg by mouth       potassium chloride (KLOR-CON) 20 MEQ packet Take 20 mEq by mouth       torsemide (DEMADEX) 20 MG tablet Take 1/2 tablet daily = 10mg daily 45 tablet 3     warfarin (COUMADIN) 2 MG tablet Take 1 tablet (2 mg) by mouth daily (Patient taking differently: Take 3 mg by mouth daily )       Warfarin Therapy Reminder 1 each continuous prn           PHYSICAL EXAMINATION:  She  is awake, alert and in no apparent distress.    Her tympanic membranes are clear and intact bilaterally. External auditory canals are clear.  Nasal exam shows a mild septal deviation without obstruction.  Examination of the oral cavity shows no suspicious lesions.  There is symmetric movement of the tongue and soft palate.    The oropharynx is clear.  Her neck is supple without significant adenopathy.  The tracheostomy site is healing well.  A complete seal is present.  Pulse is regular.  Upper airway is clear.  Cranial nerves II-XII are grossly intact.      ROCEDURE: Due  to her symptoms of mild swallowing and intermittent stridor a flexible laryngoscopy  was performed.  Informed consent was obtained and a time out was performed. 3% lidocaine and 0.25% phenylephrine was sprayed into the nasal cavity and allowed 3 to 5 minutes for effect. The scope was passed through the right sided nostril. Examination showed the vocal folds to be mobile and meet in the midline.  No nodules, polyps or ulcerations are seen.  There is mild inflammation or erythema of the supraglottic or glottic larynx.  With phonation there is moderate contraction of the supraglottic larynx.  The hypopharynx is otherwise clear as is the subglottis.        Repiration           Phonation        IMPRESSION/PLAN: She is doing well after decannulation .  Her airway and voice and swallowing symptoms are for her at this time.  I did offer her a trial of speech therapy for each of these but she would like to hold off for now.  I have asked her to call us should she change her mind and   would like to have additional intervention.

## 2019-08-02 ENCOUNTER — OFFICE VISIT (OUTPATIENT)
Dept: CARDIOLOGY | Facility: CLINIC | Age: 79
End: 2019-08-02
Attending: NURSE PRACTITIONER
Payer: COMMERCIAL

## 2019-08-02 ENCOUNTER — CARE COORDINATION (OUTPATIENT)
Dept: CARDIOLOGY | Facility: CLINIC | Age: 79
End: 2019-08-02

## 2019-08-02 VITALS
WEIGHT: 151.4 LBS | SYSTOLIC BLOOD PRESSURE: 110 MMHG | HEIGHT: 64 IN | DIASTOLIC BLOOD PRESSURE: 58 MMHG | BODY MASS INDEX: 25.85 KG/M2 | HEART RATE: 68 BPM

## 2019-08-02 DIAGNOSIS — I48.19 PERSISTENT ATRIAL FIBRILLATION (H): ICD-10-CM

## 2019-08-02 DIAGNOSIS — I50.32 CHRONIC DIASTOLIC CONGESTIVE HEART FAILURE (H): Primary | ICD-10-CM

## 2019-08-02 DIAGNOSIS — I71.010 ASCENDING AORTIC DISSECTION (H): ICD-10-CM

## 2019-08-02 DIAGNOSIS — I50.32 CHRONIC DIASTOLIC CONGESTIVE HEART FAILURE (H): ICD-10-CM

## 2019-08-02 DIAGNOSIS — I10 BENIGN ESSENTIAL HYPERTENSION: ICD-10-CM

## 2019-08-02 LAB
ANION GAP SERPL CALCULATED.3IONS-SCNC: 7.1 MMOL/L (ref 6–17)
BUN SERPL-MCNC: 22 MG/DL (ref 7–30)
CALCIUM SERPL-MCNC: 9.9 MG/DL (ref 8.5–10.5)
CHLORIDE SERPL-SCNC: 109 MMOL/L (ref 98–107)
CO2 SERPL-SCNC: 24 MMOL/L (ref 23–29)
CREAT SERPL-MCNC: 1.18 MG/DL (ref 0.7–1.3)
GFR SERPL CREATININE-BSD FRML MDRD: 45 ML/MIN/{1.73_M2}
GLUCOSE SERPL-MCNC: 86 MG/DL (ref 70–105)
POTASSIUM SERPL-SCNC: 4.1 MMOL/L (ref 3.5–5.1)
SODIUM SERPL-SCNC: 136 MMOL/L (ref 136–145)

## 2019-08-02 PROCEDURE — 36415 COLL VENOUS BLD VENIPUNCTURE: CPT | Performed by: NURSE PRACTITIONER

## 2019-08-02 PROCEDURE — 80048 BASIC METABOLIC PNL TOTAL CA: CPT | Performed by: NURSE PRACTITIONER

## 2019-08-02 PROCEDURE — 99214 OFFICE O/P EST MOD 30 MIN: CPT | Performed by: NURSE PRACTITIONER

## 2019-08-02 ASSESSMENT — MIFFLIN-ST. JEOR: SCORE: 1153.81

## 2019-08-02 NOTE — LETTER
8/2/2019    Alecia Millan MD, MD  [employer] [address]  [facility] [address]    RE: Priya Funez       Dear Colleague,    I had the pleasure of seeing Priya Funez in the [facility].    Cardiology Clinic Progress Note  Priya Funez MRN# [id number]   YOB: 1940 Age: 78 year old   Primary Cardiologist: Dr. Morales Reason for visit: CORE follow up            Assessment and Plan:   Priya Funez is a very pleasant 78 year old female with a history of HFpEF, atrial fibrillation s/p AV leo ablation with PPM implantation 1/11/2019, emergent aortic dissection repair 1/4/19, hypertension and obesity. Patient here today for CORE follow up.       1.  Chronic diastolic heart failure/HFpEF :  LVEF 60-65%, LV normal size/function, RV mild to moderately dilated, RV systolic function mildly decreased. Patient appears compensated and  euvolemic. Patient has been monitoring weight at home, weight down 3.6# on home scale and 4# on clinic scale since last core visit on 7/19/19. Labs from today not available for review during clinic visit, will review once posted and call patient. Will consider adjustments in diuretic if creatinine remains elevated. For now continue torsemide 10mg daily.     - NYHA class III, stage C   - Fluid status :  euvolemic   - Diuretic regimen : torsemide 10mg daily   - Aldosterone antagonist : will consider in future if kidney function stable.    - Blood pressure : controlled   - Reinforced HF education provided today, reviewed low sodium diet and when to notify CORE clinic.   - Reviewed consideration for WEL program, which she states she will consider.   2. Chronic atrial fibrillation s/p AV node ablation 1/2019 - stable, asymptomatic.    - Continue routine follow up with EP and device clinic   - [id number] score 3 (age, female, HF)   - Continue warfarin for thromboembolic prophylaxis  3. Emergent aortic dissection repair 1/4/19   - CV  surgery recommends repeat CT 6 months after surgery for surveillance - scheduled for 7/10/19, cancelled due to GFR 34, plan to reschedule once kidney function normalizes. Labs from today still pending.   4. Hypertension - controlled, continue current medications  5. Obesity - counseled patient on weight loss strategies.  6. Mild mitral regurgitation  7. Moderate tricuspid regurgitation  8. Acute kidney injury - baseline creatinine 0.9-1, patient noted to have acute kidney injury during hospitalization in January, normalized after discharge and recently noted to have worsening kidney function, likely related to overdiuresis, kidney function has started to improve with decrease in diuretics. Creatinine improved from 1.49-> 1.31 7/19/19. Labs not available for review during clinic visit today, will review and call patient after results are back.        Changes today: none, lab results not available for review during clinic visit, will call patient to review results and consider adjustments in diuretic after reviewing results.     Follow up plan:     CORE follow up in 3 months    If lab results show stable kidney function, will have patient reschedule CT scan for surveillance after aortic dissection.         ADDENDUM 8/2/19 @ 10:45 - lab results reviewed which show continued improvement in kidney function, creatinine today 1.18. Electrolytes stable. CORE RN to call patient and review results. Plan to continue current diuretic regimen. Advised patient to reschedule CT scan at this time, for surveillance after aortic dissection repair.           History of Presenting Illness:    Priya Funez is a very pleasant 78 year old female with a history of HFpEF, atrial fibrillation s/p AV leo ablation with PPM implantation 1/11/2019, emergent aortic dissection repair 1/4/19, hypertension and obesity.     Patient presented in January with dissecting aortic aneurysm, she was hospitalized from 1/4/19-1/29/19. Patient had a  "complicated hospital course. There was prolonged ischemia to the SMA and right renal regions. She required tracheostomy placement and was discharged to [facility] on ventilatory support. Patients tracheostomy has been discontinued and she transitioned home the beginning of June.     She was most recently seen by Dr. Morales on 7/1/19 at which point patient was noted to be volume up on exam. Recommended increasing her torsemide to 20mg BID. Lymphedema clinic referral placed and CORE consult ordered. Patient seen for CORE enrollment 7/9/19 at which time her weight was noted to be down 8#, improved lower extremity edema. Labs showed worsening kidney function creatinine 0.97-> 1.49 today. BUN 21. Torsemide was decreased to 20mg daily.     Echocardiogram 6/26/19 showed LVEF 60-65%, LV normal size/function, RV mild to moderately dilated, RV systolic function mildly decreased, mild MR, moderate tricuspid regurgitation.      Patient is here today for CORE followup.     Patient reports feeling good. Monitoring weights most days at home. Weight at home today 151#, patient continues to note a gradual decline, stating down 3.6# since last visit. Clinic weight today is 151# down 4# since 7/30/19. Lower extremity edema resolved. Wearing compression stockings. Denies abdominal distention/bloating. Denies shortness of breath at rest. Will note occasional exertional dyspnea with activity, \"long walk\", pulling weeds. Today walked to clinic, then the cafeteria and back with no dyspnea. Able to complete ADLS and IADLs independently. Feels she is getting stronger. Notes she is now going up and down basement stairs. Denies PND/orthopnea this past week. Energy levels are good. Patient denies chest pain or chest tightness. Denies dizziness, lightheadedness or other presyncopal symptoms. Denies tachycardia or palpitations.     Labs today not available during clinic visit, still waiting on results. Labs in our system show elevated creatinine " during time of hospitalization, labs from April and June show creatinine of 0.9. Blood pressure 110/58 and HR 68 in clinic today.    Appetite good. Eating 1/2 meals out and 1/2 meals at home. Not adding additional salt to foods. Typically eating at [employer], [employer], [employer]. Enjoys cottage cheese. Enjoys grilling at home. Typically eating a piece of grilled meat and frozen vegetables. Complete home PT/OT. Walking in the mall 2-3 days per week for activity. Denies alcohol use. Denies tobacco use.         Recent Hospitalizations   1/4/19-1/29/19 r/t dissecting aortic aneurysm, underwent emergent repair 1/4/19. Patient had a complicated hospital course. There was prolonged ischemia to the SMA and right renal regions. She required tracheostomy placement and was discharged to [facility] on ventilatory support.         Social History     55 years, 4 children, 8 grandchildren, enjoys her gardens in the summer.   Social History     Socioeconomic History     Marital status:      Spouse name: Not on file     Number of children: Not on file     Years of education: Not on file     Highest education level: Not on file   Occupational History     Not on file   Social Needs     Financial resource strain: Not on file     Food insecurity:     Worry: Not on file     Inability: Not on file     Transportation needs:     Medical: Not on file     Non-medical: Not on file   Tobacco Use     Smoking status: Never Smoker     Smokeless tobacco: Never Used     Tobacco comment: hisband   Substance and Sexual Activity     Alcohol use: No     Frequency: Never     Comment: no alcohol 7/9/19     Drug use: No     Sexual activity: Not on file   Lifestyle     Physical activity:     Days per week: Not on file     Minutes per session: Not on file     Stress: Not on file   Relationships     Social connections:     Talks on phone: Not on file     Gets together: Not on file     Attends Denominational service: Not on file     Active member of club or  "organization: Not on file     Attends meetings of clubs or organizations: Not on file     Relationship status: Not on file     Intimate partner violence:     Fear of current or ex partner: Not on file     Emotionally abused: Not on file     Physically abused: Not on file     Forced sexual activity: Not on file   Other Topics Concern     Parent/sibling w/ CABG, MI or angioplasty before 65F 55M? Not Asked   Social History Narrative     Not on file            Review of Systems:   Skin:  Negative     Eyes:  Negative    ENT:  Negative    Respiratory:  Positive for dyspnea on exertion;shortness of breath;cough  Cardiovascular:    edema;Positive for(fatigue better, edema improved wears support hose.)  Gastroenterology: Negative for melena;hematochezia  Genitourinary:  Positive for urinary frequency  Musculoskeletal:  Positive for arthritis;joint pain  Neurologic:  Negative    Psychiatric:  Positive for anxiety;depression  Heme/Lymph/Imm:  Positive for allergies  Endocrine:  Negative           Physical Exam:   Vitals: /58   Pulse 68   Ht 1.613 m (5' 3.5\")   Wt 68.7 kg (151 lb 6.4 oz)   BMI 26.40 kg/m      Wt Readings from Last 4 Encounters:   08/02/19 68.7 kg (151 lb 6.4 oz)   07/30/19 70.3 kg (155 lb)   07/19/19 70.3 kg (155 lb)   07/09/19 70.8 kg (156 lb)     GEN: well nourished, in no acute distress.  HEENT:  Pupils equal, round. Sclerae nonicteric.   NECK: Supple, no masses appreciated. JVD slightly elevated (known TR)  C/V:  Regular rate and rhythm, no murmur, rub or gallop.    RESP: Respirations are unlabored. Clear to auscultation bilaterally without wheezing, rales, or rhonchi.  GI: Abdomen soft, nontender.  EXTREM: No edema. Venous stasis pigment changes present.   NEURO: Alert and oriented, cooperative.  SKIN: Warm and dry.        Data:   ECHO 6/26/19  The left ventricle is normal in size.  Left ventricular systolic function is normal.  The visual ejection fraction is estimated at 60-65%.  Normal left " ventricular wall motion  The right ventricle is mild to moderately dilated.  Mildly decreased right ventricular systolic function  There is mild (1+) mitral regurgitation.  There is moderate (2+) tricuspid regurgitation.  IVC diameter and respiratory changes fall into an intermediate range  suggesting an RA pressure of 8 mmHg.  Right ventricular systolic pressure is elevated, consistent with mild  pulmonary hypertension.     Since the previous study, the RV now appears larger with mildly decreased  function. TR has increased.    LIPID RESULTS:  Lab Results   Component Value Date    TRIG 96 01/28/2019     LIVER ENZYME RESULTS:  Lab Results   Component Value Date    AST 27 04/04/2019    ALT 19 04/04/2019     CBC RESULTS:  Lab Results   Component Value Date    WBC 6.7 04/04/2019    RBC 3.70 (L) 04/04/2019    HGB 9.1 (L) 04/04/2019    HCT 31.7 (L) 04/04/2019    MCV 86 04/04/2019    MCH 24.6 (L) 04/04/2019    MCHC 28.7 (L) 04/04/2019    RDW 17.5 (H) 04/04/2019     04/04/2019     BMP RESULTS:  Lab Results   Component Value Date     07/19/2019    POTASSIUM 4.3 07/19/2019    CHLORIDE 102 07/19/2019    CO2 26 07/19/2019    ANIONGAP 13.3 07/19/2019    GLC 91 07/19/2019    BUN 29 07/19/2019    CR 1.31 (H) 07/19/2019    GFRESTIMATED 39 (L) 07/19/2019    GFRESTBLACK 48 (L) 07/19/2019    BESS 10.1 07/19/2019      A1C RESULTS:  Lab Results   Component Value Date    A1C 5.3 01/04/2019     INR RESULTS:  Lab Results   Component Value Date    INR 2.66 (H) 04/04/2019    INR 2.32 (H) 01/29/2019            Medications     Current Outpatient Medications   Medication Sig Dispense Refill     aspirin (ASA) 81 MG EC tablet Take 81 mg by mouth every 24 hours       cholecalciferol (VITAMIN D-1000 MAX ST) 1000 units TABS Take 1,000 Units by mouth daily        cyanocobalamin (VITAMIN B-12) 100 MCG tablet Take 2,000 mcg by mouth daily       escitalopram (LEXAPRO) 10 MG tablet Take 10 mg by mouth daily       metoprolol tartrate  (LOPRESSOR) 25 MG tablet Take 25 mg by mouth 2 times daily       mirtazapine (REMERON) 15 MG tablet Take 15 mg by mouth       potassium chloride (KLOR-CON) 20 MEQ packet Take 20 mEq by mouth daily        torsemide (DEMADEX) 20 MG tablet Take 1/2 tablet daily = 10mg daily 45 tablet 3     warfarin (COUMADIN) 2 MG tablet Take 1 tablet (2 mg) by mouth daily (Patient taking differently: Take 3 mg by mouth daily Taking 3mg M,W,S,HATCH, and 4mg Tues and Thurs.)       Warfarin Therapy Reminder 1 each continuous prn            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, s/p Replacement of the ascending aorta with a 34 mm Hemashield branch graft 1/4/19     Heart failure (H)      Hypertension      Persistent atrial fibrillation (H)     post-op 1/2019: AV node ablation and single chamber pacemaker implanted 1/11/2019     Pleural effusion, bilateral     s/p left thoracentesis of 150cc 1/11/19;  right thoracentesis of 400cc 1/24/2019     Past Surgical History:   Procedure Laterality Date     ANGIOGRAM Right 1/4/2019    Procedure: DIAGONSTIC ANGIOGRAM OF SMA;  Surgeon: Rigo Jennings MD;  Location:  OR     BYPASS GRAFT ARTERY CORONARY, REPAIR VALVE AORTIC, COMBINED N/A 1/4/2019    Procedure: AORTIC DISSECTION REPAIR WITH GRAFT- HEMASHIELD PLATINUM WOVEN DOUBLE VELOR 4 SIDE ARM VASCULAR GRAFT, D:35 X 12 X 10 X 10MM/ L:50CM;  Surgeon: Jose Winslow MD;  Location:  OR     EP ABLATION AV NODE N/A 1/11/2019    Procedure: EP Ablation AV Node;  Surgeon: Tsering Davey MD;  Location:  HEART CARDIAC CATH LAB     EP PACEMAKER Left 1/11/2019    Procedure: EP Pacemaker;  Surgeon: Tsering Davey MD;  Location:  HEART CARDIAC CATH LAB     TRACHEOSTOMY N/A 1/25/2019    Procedure: TRACHEOSTOMY  (DR MCCLELLAND);  Surgeon: Bryson Mcclelland MD;  Location:  OR            Allergies   Amoxicillin and Ciprofloxacin        Xochitl Berman, APRN CNP  [facility]  Pager: [phone]    Thank you for allowing me to participate in the care of your patient.    Sincerely,     TAMRA Ridley [facility]

## 2019-08-02 NOTE — PROGRESS NOTES
Cardiology Clinic Progress Note  Priya Funez MRN# [id number]   YOB: 1940 Age: 78 year old   Primary Cardiologist: Dr. Morales Reason for visit: CORE follow up            Assessment and Plan:   Priya Funez is a very pleasant 78 year old female with a history of HFpEF, atrial fibrillation s/p AV leo ablation with PPM implantation 1/11/2019, emergent aortic dissection repair 1/4/19, hypertension and obesity. Patient here today for CORE follow up.       1.  Chronic diastolic heart failure/HFpEF :  LVEF 60-65%, LV normal size/function, RV mild to moderately dilated, RV systolic function mildly decreased. Patient appears compensated and  euvolemic. Patient has been monitoring weight at home, weight down 3.6# on home scale and 4# on clinic scale since last core visit on 7/19/19. Labs from today not available for review during clinic visit, will review once posted and call patient. Will consider adjustments in diuretic if creatinine remains elevated. For now continue torsemide 10mg daily.     - NYHA class III, stage C   - Fluid status :  euvolemic   - Diuretic regimen : torsemide 10mg daily   - Aldosterone antagonist : will consider in future if kidney function stable.    - Blood pressure : controlled   - Reinforced HF education provided today, reviewed low sodium diet and when to notify CORE clinic.   - Reviewed consideration for WEL program, which she states she will consider.   2. Chronic atrial fibrillation s/p AV node ablation 1/2019 - stable, asymptomatic.    - Continue routine follow up with EP and device clinic   - [id number] score 3 (age, female, HF)   - Continue warfarin for thromboembolic prophylaxis  3. Emergent aortic dissection repair 1/4/19   - CV surgery recommends repeat CT 6 months after surgery for surveillance - scheduled for 7/10/19, cancelled due to GFR 34, plan to reschedule once kidney function normalizes. Labs from today still pending.   4. Hypertension - controlled,  continue current medications  5. Obesity - counseled patient on weight loss strategies.  6. Mild mitral regurgitation  7. Moderate tricuspid regurgitation  8. Acute kidney injury - baseline creatinine 0.9-1, patient noted to have acute kidney injury during hospitalization in January, normalized after discharge and recently noted to have worsening kidney function, likely related to overdiuresis, kidney function has started to improve with decrease in diuretics. Creatinine improved from 1.49-> 1.31 7/19/19. Labs not available for review during clinic visit today, will review and call patient after results are back.        Changes today: none, lab results not available for review during clinic visit, will call patient to review results and consider adjustments in diuretic after reviewing results.     Follow up plan:     CORE follow up in 3 months    If lab results show stable kidney function, will have patient reschedule CT scan for surveillance after aortic dissection.         ADDENDUM 8/2/19 @ 10:45 - lab results reviewed which show continued improvement in kidney function, creatinine today 1.18. Electrolytes stable. CORE RN to call patient and review results. Plan to continue current diuretic regimen. Advised patient to reschedule CT scan at this time, for surveillance after aortic dissection repair.           History of Presenting Illness:    Priya Funez is a very pleasant 78 year old female with a history of HFpEF, atrial fibrillation s/p AV leo ablation with PPM implantation 1/11/2019, emergent aortic dissection repair 1/4/19, hypertension and obesity.     Patient presented in January with dissecting aortic aneurysm, she was hospitalized from 1/4/19-1/29/19. Patient had a complicated hospital course. There was prolonged ischemia to the SMA and right renal regions. She required tracheostomy placement and was discharged to [facility] on ventilatory support. Patients tracheostomy has been discontinued and she  "transitioned home the beginning of June.     She was most recently seen by Dr. Morales on 7/1/19 at which point patient was noted to be volume up on exam. Recommended increasing her torsemide to 20mg BID. Lymphedema clinic referral placed and CORE consult ordered. Patient seen for CORE enrollment 7/9/19 at which time her weight was noted to be down 8#, improved lower extremity edema. Labs showed worsening kidney function creatinine 0.97-> 1.49 today. BUN 21. Torsemide was decreased to 20mg daily.     Echocardiogram 6/26/19 showed LVEF 60-65%, LV normal size/function, RV mild to moderately dilated, RV systolic function mildly decreased, mild MR, moderate tricuspid regurgitation.      Patient is here today for CORE followup.     Patient reports feeling good. Monitoring weights most days at home. Weight at home today 151#, patient continues to note a gradual decline, stating down 3.6# since last visit. Clinic weight today is 151# down 4# since 7/30/19. Lower extremity edema resolved. Wearing compression stockings. Denies abdominal distention/bloating. Denies shortness of breath at rest. Will note occasional exertional dyspnea with activity, \"long walk\", pulling weeds. Today walked to clinic, then the cafeteria and back with no dyspnea. Able to complete ADLS and IADLs independently. Feels she is getting stronger. Notes she is now going up and down basement stairs. Denies PND/orthopnea this past week. Energy levels are good. Patient denies chest pain or chest tightness. Denies dizziness, lightheadedness or other presyncopal symptoms. Denies tachycardia or palpitations.     Labs today not available during clinic visit, still waiting on results. Labs in our system show elevated creatinine during time of hospitalization, labs from April and June show creatinine of 0.9. Blood pressure 110/58 and HR 68 in clinic today.    Appetite good. Eating 1/2 meals out and 1/2 meals at home. Not adding additional salt to foods. Typically " eating at [employer], [employer], [employer]. Enjoys cottage cheese. Enjoys grilling at home. Typically eating a piece of grilled meat and frozen vegetables. Complete home PT/OT. Walking in the mall 2-3 days per week for activity. Denies alcohol use. Denies tobacco use.         Recent Hospitalizations   1/4/19-1/29/19 r/t dissecting aortic aneurysm, underwent emergent repair 1/4/19. Patient had a complicated hospital course. There was prolonged ischemia to the SMA and right renal regions. She required tracheostomy placement and was discharged to [facility] on ventilatory support.         Social History     55 years, 4 children, 8 grandchildren, enjoys her gardens in the summer.   Social History     Socioeconomic History     Marital status:      Spouse name: Not on file     Number of children: Not on file     Years of education: Not on file     Highest education level: Not on file   Occupational History     Not on file   Social Needs     Financial resource strain: Not on file     Food insecurity:     Worry: Not on file     Inability: Not on file     Transportation needs:     Medical: Not on file     Non-medical: Not on file   Tobacco Use     Smoking status: Never Smoker     Smokeless tobacco: Never Used     Tobacco comment: hisband   Substance and Sexual Activity     Alcohol use: No     Frequency: Never     Comment: no alcohol 7/9/19     Drug use: No     Sexual activity: Not on file   Lifestyle     Physical activity:     Days per week: Not on file     Minutes per session: Not on file     Stress: Not on file   Relationships     Social connections:     Talks on phone: Not on file     Gets together: Not on file     Attends [employer] service: Not on file     Active member of club or organization: Not on file     Attends meetings of clubs or organizations: Not on file     Relationship status: Not on file     Intimate partner violence:     Fear of current or ex partner: Not on file     Emotionally abused: Not on file  "    Physically abused: Not on file     Forced sexual activity: Not on file   Other Topics Concern     Parent/sibling w/ CABG, MI or angioplasty before 65F 55M? Not Asked   Social History Narrative     Not on file            Review of Systems:   Skin:  Negative     Eyes:  Negative    ENT:  Negative    Respiratory:  Positive for dyspnea on exertion;shortness of breath;cough  Cardiovascular:    edema;Positive for(fatigue better, edema improved wears support hose.)  Gastroenterology: Negative for melena;hematochezia  Genitourinary:  Positive for urinary frequency  Musculoskeletal:  Positive for arthritis;joint pain  Neurologic:  Negative    Psychiatric:  Positive for anxiety;depression  Heme/Lymph/Imm:  Positive for allergies  Endocrine:  Negative           Physical Exam:   Vitals: /58   Pulse 68   Ht 1.613 m (5' 3.5\")   Wt 68.7 kg (151 lb 6.4 oz)   BMI 26.40 kg/m     Wt Readings from Last 4 Encounters:   08/02/19 68.7 kg (151 lb 6.4 oz)   07/30/19 70.3 kg (155 lb)   07/19/19 70.3 kg (155 lb)   07/09/19 70.8 kg (156 lb)     GEN: well nourished, in no acute distress.  HEENT:  Pupils equal, round. Sclerae nonicteric.   NECK: Supple, no masses appreciated. JVD slightly elevated (known TR)  C/V:  Regular rate and rhythm, no murmur, rub or gallop.    RESP: Respirations are unlabored. Clear to auscultation bilaterally without wheezing, rales, or rhonchi.  GI: Abdomen soft, nontender.  EXTREM: No edema. Venous stasis pigment changes present.   NEURO: Alert and oriented, cooperative.  SKIN: Warm and dry.        Data:   ECHO 6/26/19  The left ventricle is normal in size.  Left ventricular systolic function is normal.  The visual ejection fraction is estimated at 60-65%.  Normal left ventricular wall motion  The right ventricle is mild to moderately dilated.  Mildly decreased right ventricular systolic function  There is mild (1+) mitral regurgitation.  There is moderate (2+) tricuspid regurgitation.  IVC diameter and " respiratory changes fall into an intermediate range  suggesting an RA pressure of 8 mmHg.  Right ventricular systolic pressure is elevated, consistent with mild  pulmonary hypertension.     Since the previous study, the RV now appears larger with mildly decreased  function. TR has increased.    LIPID RESULTS:  Lab Results   Component Value Date    TRIG 96 01/28/2019     LIVER ENZYME RESULTS:  Lab Results   Component Value Date    AST 27 04/04/2019    ALT 19 04/04/2019     CBC RESULTS:  Lab Results   Component Value Date    WBC 6.7 04/04/2019    RBC 3.70 (L) 04/04/2019    HGB 9.1 (L) 04/04/2019    HCT 31.7 (L) 04/04/2019    MCV 86 04/04/2019    MCH 24.6 (L) 04/04/2019    MCHC 28.7 (L) 04/04/2019    RDW 17.5 (H) 04/04/2019     04/04/2019     BMP RESULTS:  Lab Results   Component Value Date     07/19/2019    POTASSIUM 4.3 07/19/2019    CHLORIDE 102 07/19/2019    CO2 26 07/19/2019    ANIONGAP 13.3 07/19/2019    GLC 91 07/19/2019    BUN 29 07/19/2019    CR 1.31 (H) 07/19/2019    GFRESTIMATED 39 (L) 07/19/2019    GFRESTBLACK 48 (L) 07/19/2019    BESS 10.1 07/19/2019      A1C RESULTS:  Lab Results   Component Value Date    A1C 5.3 01/04/2019     INR RESULTS:  Lab Results   Component Value Date    INR 2.66 (H) 04/04/2019    INR 2.32 (H) 01/29/2019            Medications     Current Outpatient Medications   Medication Sig Dispense Refill     aspirin (ASA) 81 MG EC tablet Take 81 mg by mouth every 24 hours       cholecalciferol (VITAMIN D-1000 MAX ST) 1000 units TABS Take 1,000 Units by mouth daily        cyanocobalamin (VITAMIN B-12) 100 MCG tablet Take 2,000 mcg by mouth daily       escitalopram (LEXAPRO) 10 MG tablet Take 10 mg by mouth daily       metoprolol tartrate (LOPRESSOR) 25 MG tablet Take 25 mg by mouth 2 times daily       mirtazapine (REMERON) 15 MG tablet Take 15 mg by mouth       potassium chloride (KLOR-CON) 20 MEQ packet Take 20 mEq by mouth daily        torsemide (DEMADEX) 20 MG tablet Take 1/2  tablet daily = 10mg daily 45 tablet 3     warfarin (COUMADIN) 2 MG tablet Take 1 tablet (2 mg) by mouth daily (Patient taking differently: Take 3 mg by mouth daily Taking 3mg M,W,S,HATCH, and 4mg Tues and Thurs.)       Warfarin Therapy Reminder 1 each continuous prn            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, s/p Replacement of the ascending aorta with a 34 mm Hemashield branch graft 1/4/19     Heart failure (H)      Hypertension      Persistent atrial fibrillation (H)     post-op 1/2019: AV node ablation and single chamber pacemaker implanted 1/11/2019     Pleural effusion, bilateral     s/p left thoracentesis of 150cc 1/11/19;  right thoracentesis of 400cc 1/24/2019     Past Surgical History:   Procedure Laterality Date     ANGIOGRAM Right 1/4/2019    Procedure: DIAGONSTIC ANGIOGRAM OF SMA;  Surgeon: Rigo Jennings MD;  Location:  OR     BYPASS GRAFT ARTERY CORONARY, REPAIR VALVE AORTIC, COMBINED N/A 1/4/2019    Procedure: AORTIC DISSECTION REPAIR WITH GRAFT- HEMASHIELD PLATINUM WOVEN DOUBLE VELOR 4 SIDE ARM VASCULAR GRAFT, D:35 X 12 X 10 X 10MM/ L:50CM;  Surgeon: Jose Winslow MD;  Location:  OR     EP ABLATION AV NODE N/A 1/11/2019    Procedure: EP Ablation AV Node;  Surgeon: Tsering Davey MD;  Location:  HEART [facility]ARD[facility] CATH LAB     EP PACEMAKER Left 1/11/2019    Procedure: EP Pacemaker;  Surgeon: Tsering Davey MD;  Location:  HEART [facility]ARD[facility] CATH LAB     TRACHEOSTOMY N/A 1/25/2019    Procedure: TRACHEOSTOMY  (DR MCCLELLAND);  Surgeon: Bryson Mcclelland MD;  Location:  OR            Allergies   Amoxicillin and Ciprofloxacin        Xochitl Berman, TAMRA [facility]  Pager: [phone]

## 2019-08-02 NOTE — PROGRESS NOTES
Call to pt, reviewed BMP that was resulted following OV with Xochitl Berman CNP, today. Reviewed that pt should continue torsemide 10 mg daily and r/s CT to look at aortic dissection repair. Advised pt to continue weighing herself daily and call us PRN for weight changes or sx. Transferred to scheduling to make appt for CT. Pt verbalized understanding. Lucie Zhong RN on 8/2/2019 at 2:46 PM      Associated Results     Result Notes for Basic metabolic panel     Notes recorded by Xochitl Berman APRN CNP on 8/2/2019 at 10:43 AM CDT  Lab results reviewed, normalization of kidney function, creatinine 1.18. Electrolytes stable.     Please call patient to review lab results, continue torsemide 10mg daily. Please have patient reschedule CT scan at this time for surveillance monitoring after aortic dissection repair.

## 2019-08-07 ENCOUNTER — HOSPITAL ENCOUNTER (OUTPATIENT)
Dept: CT IMAGING | Facility: CLINIC | Age: 79
Discharge: HOME | End: 2019-08-07
Attending: INTERNAL MEDICINE | Admitting: INTERNAL MEDICINE
Payer: COMMERCIAL

## 2019-08-07 DIAGNOSIS — I71.010 ASCENDING AORTIC DISSECTION (H): ICD-10-CM

## 2019-08-07 PROCEDURE — 74174 CTA ABD&PLVS W/CONTRAST: CPT

## 2019-08-07 PROCEDURE — 25000128 H RX IP 250 OP 636: Performed by: INTERNAL MEDICINE

## 2019-08-07 RX ORDER — IOPAMIDOL 755 MG/ML
80 INJECTION, SOLUTION INTRAVASCULAR ONCE
Status: COMPLETED | OUTPATIENT
Start: 2019-08-07 | End: 2019-08-07

## 2019-08-07 RX ADMIN — IOPAMIDOL 80 ML: 755 INJECTION, SOLUTION INTRAVENOUS at 16:00

## 2019-08-09 ENCOUNTER — CARE COORDINATION (OUTPATIENT)
Dept: CARDIOLOGY | Facility: CLINIC | Age: 79
End: 2019-08-09

## 2019-08-09 NOTE — PROGRESS NOTES
Pt called Xochitl's CORE phone to inquire about her CTA abdomen results. Pt is status post type A aortic dissection repair from January 2019, and this is 6 month follow up CTA.  has been sent results. I called KIKE Perales with CV surgery and she reviewed summary report. No acute process going on, and the changes noted look appropriate for healing process, but  will need to review in more detail when he is back in clinic Monday. I called pt back with an update. She said she has felt well other than occasional fatigue. Update sent to  and I told pt she can expect a call early next week on the results. Rosalva STOKES August 9, 2019, 3:35 PM      IMPRESSION:  Similar appearance of type A aortic dissection status post aortic root  repair. Dissection flap extends from the distal aspect of the repair  to the left common iliac artery origin.  Age indeterminate, likely subacute L1 wedge-shaped compression  deformity. Increasing height loss of T12 compression deformity.

## 2019-08-13 NOTE — PROGRESS NOTES
Received message from Dr. Morales:  Benjamin Morales MD Sletteboe, Erin B., RN   Caller: Unspecified (4 days ago,  3:32 PM)             This CT was done per CV surgery plan. Results look stable. I would not do anything further at this time. Should be reviewed with CV surery

## 2019-08-13 NOTE — PROGRESS NOTES
Spoke with pt about Dr. Morales's response about her CTA results. Informed pt that this will be reviewed by CV surgery again just in case there are any the recommendations.

## 2019-08-19 ENCOUNTER — TELEPHONE (OUTPATIENT)
Dept: OTHER | Facility: CLINIC | Age: 79
End: 2019-08-19

## 2019-08-19 NOTE — TELEPHONE ENCOUNTER
I called patient with CT results post type A dissection repair in 1/2019. No surgical issues noted. Patient in CORE clinic for on going management. All questions addressed.

## 2019-09-20 DIAGNOSIS — I48.19 PERSISTENT ATRIAL FIBRILLATION (H): ICD-10-CM

## 2019-09-20 DIAGNOSIS — I10 BENIGN ESSENTIAL HYPERTENSION: ICD-10-CM

## 2019-09-20 DIAGNOSIS — I50.32 CHRONIC DIASTOLIC CONGESTIVE HEART FAILURE (H): Primary | ICD-10-CM

## 2019-09-20 RX ORDER — METOPROLOL TARTRATE 25 MG/1
25 TABLET, FILM COATED ORAL 2 TIMES DAILY
Qty: 180 TABLET | Refills: 3 | Status: SHIPPED | OUTPATIENT
Start: 2019-09-20 | End: 2020-03-19

## 2019-10-14 ENCOUNTER — ANCILLARY PROCEDURE (OUTPATIENT)
Dept: CARDIOLOGY | Facility: CLINIC | Age: 79
End: 2019-10-14
Attending: INTERNAL MEDICINE
Payer: COMMERCIAL

## 2019-10-14 DIAGNOSIS — Z95.0 CARDIAC PACEMAKER IN SITU: ICD-10-CM

## 2019-10-14 PROCEDURE — 93294 REM INTERROG EVL PM/LDLS PM: CPT | Performed by: INTERNAL MEDICINE

## 2019-10-14 PROCEDURE — 93296 REM INTERROG EVL PM/IDS: CPT | Performed by: INTERNAL MEDICINE

## 2019-10-15 ENCOUNTER — OFFICE VISIT (OUTPATIENT)
Dept: CARDIOLOGY | Facility: CLINIC | Age: 79
End: 2019-10-15
Attending: NURSE PRACTITIONER
Payer: COMMERCIAL

## 2019-10-15 VITALS
WEIGHT: 157 LBS | BODY MASS INDEX: 26.8 KG/M2 | OXYGEN SATURATION: 96 % | DIASTOLIC BLOOD PRESSURE: 82 MMHG | HEART RATE: 92 BPM | SYSTOLIC BLOOD PRESSURE: 136 MMHG | HEIGHT: 64 IN

## 2019-10-15 DIAGNOSIS — I50.32 CHRONIC DIASTOLIC CONGESTIVE HEART FAILURE (H): ICD-10-CM

## 2019-10-15 DIAGNOSIS — I48.19 PERSISTENT ATRIAL FIBRILLATION (H): ICD-10-CM

## 2019-10-15 DIAGNOSIS — I07.1 TRICUSPID VALVE INSUFFICIENCY, UNSPECIFIED ETIOLOGY: ICD-10-CM

## 2019-10-15 DIAGNOSIS — I50.32 CHRONIC DIASTOLIC CONGESTIVE HEART FAILURE (H): Primary | ICD-10-CM

## 2019-10-15 DIAGNOSIS — I10 BENIGN ESSENTIAL HYPERTENSION: ICD-10-CM

## 2019-10-15 DIAGNOSIS — I71.010 ASCENDING AORTIC DISSECTION (H): ICD-10-CM

## 2019-10-15 LAB
ANION GAP SERPL CALCULATED.3IONS-SCNC: 11.4 MMOL/L (ref 6–17)
BUN SERPL-MCNC: 28 MG/DL (ref 7–30)
CALCIUM SERPL-MCNC: 9.9 MG/DL (ref 8.5–10.5)
CHLORIDE SERPL-SCNC: 99 MMOL/L (ref 98–107)
CO2 SERPL-SCNC: 29 MMOL/L (ref 23–29)
CREAT SERPL-MCNC: 1.25 MG/DL (ref 0.7–1.3)
GFR SERPL CREATININE-BSD FRML MDRD: 42 ML/MIN/{1.73_M2}
GLUCOSE SERPL-MCNC: 84 MG/DL (ref 70–105)
POTASSIUM SERPL-SCNC: 4.4 MMOL/L (ref 3.5–5.1)
SODIUM SERPL-SCNC: 135 MMOL/L (ref 136–145)

## 2019-10-15 PROCEDURE — 99215 OFFICE O/P EST HI 40 MIN: CPT | Performed by: NURSE PRACTITIONER

## 2019-10-15 PROCEDURE — 80048 BASIC METABOLIC PNL TOTAL CA: CPT | Performed by: NURSE PRACTITIONER

## 2019-10-15 PROCEDURE — 36415 COLL VENOUS BLD VENIPUNCTURE: CPT | Performed by: NURSE PRACTITIONER

## 2019-10-15 RX ORDER — POTASSIUM CHLORIDE 1.5 G/1.58G
20 POWDER, FOR SOLUTION ORAL
Qty: 40 PACKET | Refills: 3 | Status: SHIPPED | OUTPATIENT
Start: 2019-10-16 | End: 2019-12-19

## 2019-10-15 ASSESSMENT — MIFFLIN-ST. JEOR: SCORE: 1174.21

## 2019-10-15 NOTE — LETTER
10/15/2019    lAecia Millan MD, MD  [employer] [address]  [facility] [address]    RE: Priya Funez       Dear Colleague,    I had the pleasure of seeing Priya Funez in the [facility].    Cardiology Clinic Progress Note  Priya Funez MRN# [id number]   YOB: 1940 Age: 79 year old   Primary Cardiologist: Dr. Morales Reason for visit: CORE follow up            Assessment and Plan:   Priya Funez is a very pleasant 78 year old female with a history of HFpEF, atrial fibrillation s/p AV leo ablation with PPM implantation 1/11/2019, emergent aortic dissection repair 1/4/19, hypertension and obesity. Patient here today for CORE follow up.       1.  Chronic diastolic heart failure/HFpEF :  LVEF 60-65%, LV normal size/function, RV mild to moderately dilated, RV systolic function mildly decreased. Patient appears compensated and  euvolemic. Patient has been monitoring weight at home, states weight has been stable at 150# at home. Labs from today show stable kidney function and electrolytes. Patient has only been taking potassium 2-3x per week, advised to continue with that regimen as potassium level is stable today. Plan to continue current diuretic regimen.    - NYHA class III, stage C   - Fluid status :  euvolemic   - Diuretic regimen : torsemide 10mg daily   - Aldosterone antagonist : will consider if changes or worsening symptoms.   - Blood pressure : controlled   - Reinforced HF education provided today, reviewed low sodium diet and when to notify CORE clinic.  2. Chronic atrial fibrillation s/p AV node ablation 1/2019 - stable, asymptomatic. Patient reporting labile INR recently, reviewed consideration for one of the new noval anticoagulations (eliquis/xarelto), patient notes she wishes to think about. Advised to call clinic if she is interested in changing.    - Continue routine follow up with EP and device clinic   - [id number] score 3 (age,  female, HF)   - Continue warfarin for thromboembolic prophylaxis  3. Emergent aortic dissection repair 1/4/19   - CV surgery recommends repeat CT 6 months after surgery for surveillance - completed 8/7/2019 which was stable, showing similar appearance as post repair.   4. Hypertension - controlled, continue current medications  5. Obesity - counseled patient on weight loss strategies.  6. Mild mitral regurgitation  7. Moderate tricuspid regurgitation      Changes today: none    Follow up plan:     CORE follow up in 2 months          History of Presenting Illness:    Priya Funez is a very pleasant 78 year old female with a history of HFpEF, atrial fibrillation s/p AV leo ablation with PPM implantation 1/11/2019, emergent aortic dissection repair 1/4/19, hypertension and obesity.     Patient presented in January with dissecting aortic aneurysm, she was hospitalized from 1/4/19-1/29/19. Patient had a complicated hospital course. There was prolonged ischemia to the SMA and right renal regions. She required tracheostomy placement and was discharged to [place] on ventilatory support. Patients tracheostomy has been discontinued and she transitioned home the beginning of June.     She was seen by Dr. Morales on 7/1/19 at which point patient was noted to be volume up on exam. Recommended increasing her torsemide to 20mg BID. Lymphedema clinic referral placed and CORE consult ordered. I first met patient for CORE enrollment 7/9/19. She has been following closely in CORE clinic since.     Echocardiogram 6/26/19 showed LVEF 60-65%, LV normal size/function, RV mild to moderately dilated, RV systolic function mildly decreased, mild MR, moderate tricuspid regurgitation.  She underwent surveillance CT on 8/7/2019 for aortic dissection repair which was stable, showing similar appearance as post repair.     Patient is here today for CORE followup.     Patient reports feeling good. Monitoring weights most days at home. Weight  at home today 150#, which she states has been stable, noting that it was recorded wrong at 155# today. Also states when she stood on clinic here her weight was 150.7#. Denies lower extremity edema. Denies abdominal distention/bloating. Denies shortness of breath at rest. Will note occasional exertional dyspnea with activity, I.e. going up stairs. Able to complete ADLS and IADLs independently with no dyspnea. Sleeping good at night. Denies PND/orthopnea. Energy levels are good. Patient denies chest pain or chest tightness. Denies dizziness, lightheadedness or other presyncopal symptoms. Denies tachycardia or palpitations. Taking medications daily but notes she is only taking her potassium powder replacement 2-3x per week.     Labs today show stable kidney function and electrolytes, creatinine 1.25, potassium 4.4. Blood pressure 136/82 and HR 92 in clinic today.    Appetite good. Eating 1/2 meals out and 1/2 meals at home. Not adding additional salt to foods. Typically eating at [employer], [employer], [employer]. Enjoys cottage cheese. Typically eating a piece of grilled meat and frozen vegetables. Walking in the mall 2-3 days per week for activity. Patient also notes getting exercise with her errands and going to stores. Rare alcohol use. Denies tobacco use.         Recent Hospitalizations   1/4/19-1/29/19 r/t dissecting aortic aneurysm, underwent emergent repair 1/4/19. Patient had a complicated hospital course. There was prolonged ischemia to the SMA and right renal regions. She required tracheostomy placement and was discharged to [place] on ventilatory support.         Social History     55 years, 4 children, 8 grandchildren, enjoys her gardens in the summer.   Social History     Socioeconomic History     Marital status:      Spouse name: Not on file     Number of children: Not on file     Years of education: Not on file     Highest education level: Not on file   Occupational History     Not on file  "  Social Needs     Financial resource strain: Not on file     Food insecurity:     Worry: Not on file     Inability: Not on file     Transportation needs:     Medical: Not on file     Non-medical: Not on file   Tobacco Use     Smoking status: Never Smoker     Smokeless tobacco: Never Used     Tobacco comment: hisband   Substance and Sexual Activity     Alcohol use: No     Frequency: Never     Comment: no alcohol 7/9/19     Drug use: No     Sexual activity: Not on file   Lifestyle     Physical activity:     Days per week: Not on file     Minutes per session: Not on file     Stress: Not on file   Relationships     Social connections:     Talks on phone: Not on file     Gets together: Not on file     Attends Faith service: Not on file     Active member of club or organization: Not on file     Attends meetings of clubs or organizations: Not on file     Relationship status: Not on file     Intimate partner violence:     Fear of current or ex partner: Not on file     Emotionally abused: Not on file     Physically abused: Not on file     Forced sexual activity: Not on file   Other Topics Concern     Parent/sibling w/ CABG, MI or angioplasty before 65F 55M? Not Asked   Social History Narrative     Not on file            Review of Systems:   Skin:  Negative     Eyes:  Negative    ENT:  Negative    Respiratory:  Positive for dyspnea on exertion;cough  Cardiovascular:    Positive for  Gastroenterology: Negative for melena;hematochezia  Genitourinary:  Positive for urinary frequency  Musculoskeletal:  Positive for arthritis;joint pain  Neurologic:  Negative    Psychiatric:  Positive for anxiety;depression  Heme/Lymph/Imm:  Positive for allergies  Endocrine:  Negative           Physical Exam:   Vitals: /82   Pulse 92   Ht 1.613 m (5' 3.5\")   Wt 71.2 kg (157 lb)   SpO2 96%   BMI 27.38 kg/m      Wt Readings from Last 4 Encounters:   10/15/19 71.2 kg (157 lb)   08/02/19 68.7 kg (151 lb 6.4 oz)   07/30/19 70.3 kg (155 " lb)   07/19/19 70.3 kg (155 lb)     GEN: well nourished, in no acute distress.  HEENT:  Pupils equal, round. Sclerae nonicteric.   NECK: Supple, no masses appreciated. JVD slightly elevated (known TR)  C/V:  Regular rate and rhythm, no murmur, rub or gallop.    RESP: Respirations are unlabored. Clear to auscultation bilaterally without wheezing, rales, or rhonchi.  GI: Abdomen soft, nontender.  EXTREM: No edema.   NEURO: Alert and oriented, cooperative.  SKIN: Warm and dry.        Data:   ECHO 6/26/19  The left ventricle is normal in size.  Left ventricular systolic function is normal.  The visual ejection fraction is estimated at 60-65%.  Normal left ventricular wall motion  The right ventricle is mild to moderately dilated.  Mildly decreased right ventricular systolic function  There is mild (1+) mitral regurgitation.  There is moderate (2+) tricuspid regurgitation.  IVC diameter and respiratory changes fall into an intermediate range  suggesting an RA pressure of 8 mmHg.  Right ventricular systolic pressure is elevated, consistent with mild  pulmonary hypertension.     Since the previous study, the RV now appears larger with mildly decreased  function. TR has increased.    LIPID RESULTS:  Lab Results   Component Value Date    TRIG 96 01/28/2019     LIVER ENZYME RESULTS:  Lab Results   Component Value Date    AST 27 04/04/2019    ALT 19 04/04/2019     CBC RESULTS:  Lab Results   Component Value Date    WBC 6.7 04/04/2019    RBC 3.70 (L) 04/04/2019    HGB 9.1 (L) 04/04/2019    HCT 31.7 (L) 04/04/2019    MCV 86 04/04/2019    MCH 24.6 (L) 04/04/2019    MCHC 28.7 (L) 04/04/2019    RDW 17.5 (H) 04/04/2019     04/04/2019     BMP RESULTS:  Lab Results   Component Value Date     (L) 10/15/2019    POTASSIUM 4.4 10/15/2019    CHLORIDE 99 10/15/2019    CO2 29 10/15/2019    ANIONGAP 11.4 10/15/2019    GLC 84 10/15/2019    BUN 28 10/15/2019    CR 1.25 10/15/2019    GFRESTIMATED 42 (L) 10/15/2019    GFRESTBLACK 50  (L) 10/15/2019    BESS 9.9 10/15/2019      A1C RESULTS:  Lab Results   Component Value Date    A1C 5.3 01/04/2019     INR RESULTS:  Lab Results   Component Value Date    INR 2.66 (H) 04/04/2019    INR 2.32 (H) 01/29/2019            Medications     Current Outpatient Medications   Medication Sig Dispense Refill     aspirin (ASA) 81 MG EC tablet Take 81 mg by mouth every 24 hours       cholecalciferol (VITAMIN D-1000 MAX ST) 1000 units TABS Take 1,000 Units by mouth daily        cyanocobalamin (VITAMIN B-12) 100 MCG tablet Take 2,000 mcg by mouth daily       escitalopram (LEXAPRO) 10 MG tablet Take 10 mg by mouth daily       metoprolol tartrate (LOPRESSOR) 25 MG tablet Take 1 tablet (25 mg) by mouth 2 times daily 180 tablet 3     mirtazapine (REMERON) 15 MG tablet Take 15 mg by mouth       torsemide (DEMADEX) 20 MG tablet Take 1/2 tablet daily = 10mg daily 45 tablet 3     warfarin (COUMADIN) 2 MG tablet Take 1 tablet (2 mg) by mouth daily (Patient taking differently: Take 3 mg by mouth daily Taking 3mg M,W,S,HATCH, and 4mg Tues and Thurs.)       potassium chloride (KLOR-CON) 20 MEQ packet Take 20 mEq by mouth daily        Warfarin Therapy Reminder 1 each continuous prn            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, s/p Replacement of the ascending aorta with a 34 mm Hemashield branch graft 1/4/19     Heart failure (H)      Hypertension      Persistent atrial fibrillation (H)     post-op 1/2019: AV node ablation and single chamber pacemaker implanted 1/11/2019     Pleural effusion, bilateral     s/p left thoracentesis of 150cc 1/11/19;  right thoracentesis of 400cc 1/24/2019     Past Surgical History:   Procedure Laterality Date     ANGIOGRAM Right 1/4/2019    Procedure: DIAGONSTIC ANGIOGRAM OF SMA;  Surgeon: Rigo Jennings MD;  Location: [facility] OR     BYPASS GRAFT ARTERY CORONARY, REPAIR VALVE AORTIC, COMBINED N/A 1/4/2019     Procedure: AORTIC DISSECTION REPAIR WITH GRAFT- HEMASHIELD PLATINUM WOVEN DOUBLE VELOR 4 SIDE ARM VASCULAR GRAFT, D:35 X 12 X 10 X 10MM/ L:50CM;  Surgeon: Jose Winslow MD;  Location: [facility] OR     EP ABLATION AV NODE N/A 1/11/2019    Procedure: EP Ablation AV Node;  Surgeon: Tsering Davey MD;  Location:  HEART CARDI[facility] CATH LAB     EP PACEMAKER Left 1/11/2019    Procedure: EP Pacemaker;  Surgeon: Tsering Davey MD;  Location:  HEART CARDI[facility] CATH LAB     TRACHEOSTOMY N/A 1/25/2019    Procedure: TRACHEOSTOMY  (DR MCCLELLAND);  Surgeon: Bryson Mcclelland MD;  Location:  OR            Allergies   Amoxicillin and Ciprofloxacin        TAMRA Ridley [facility]  Pager: [phone]      Thank you for allowing me to participate in the care of your patient.    Sincerely,     TAMRA Ridley CNP     [facility]

## 2019-10-15 NOTE — PATIENT INSTRUCTIONS
Call CORE nurse for any questions or concerns Mon-Fri 8am-4pm:                           [phone]               For concerns after hours:                             [phone]     Medication changes: none, continue taking potassium 3 x per week.   Plan from today: Follow up with Xochitl in 2 months with labs. Consider different blood thinner - Eliquis/Apixaban or Xarelto. Call if you want to switch.   Lab results: stable kidney function and electrolytes  Component      Latest Ref Rng & Units 8/2/2019 10/15/2019   Sodium      136 - 145 mmol/L 136 135 (L)   Potassium      3.5 - 5.1 mmol/L 4.1 4.4   Chloride      98 - 107 mmol/L 109 (H) 99   Carbon Dioxide      23 - 29 mmol/L 24 29   Anion Gap      6 - 17 mmol/L 7.1 11.4   Glucose      70 - 105 mg/dL 86 84   Urea Nitrogen      7 - 30 mg/dL 22 28   Creatinine      0.70 - 1.30 mg/dL 1.18 1.25   GFR Estimate      >60 mL/min/1.73:m2 45 (L) 42 (L)   GFR Estimate If Black      >60 mL/min/1.73:m2 54 (L) 50 (L)   Calcium      8.5 - 10.5 mg/dL 9.9 9.9

## 2019-10-15 NOTE — PROGRESS NOTES
Cardiology Clinic Progress Note  Priya Funez MRN# [id number]   YOB: 1940 Age: 79 year old   Primary Cardiologist: Dr. Morales Reason for visit: CORE follow up            Assessment and Plan:   Priya Funez is a very pleasant 78 year old female with a history of HFpEF, atrial fibrillation s/p AV leo ablation with PPM implantation 1/11/2019, emergent aortic dissection repair 1/4/19, hypertension and obesity. Patient here today for CORE follow up.       1.  Chronic diastolic heart failure/HFpEF :  LVEF 60-65%, LV normal size/function, RV mild to moderately dilated, RV systolic function mildly decreased. Patient appears compensated and  euvolemic. Patient has been monitoring weight at home, states weight has been stable at 150# at home. Labs from today show stable kidney function and electrolytes. Patient has only been taking potassium 2-3x per week, advised to continue with that regimen as potassium level is stable today. Plan to continue current diuretic regimen.    - NYHA class III, stage C   - Fluid status :  euvolemic   - Diuretic regimen : torsemide 10mg daily   - Aldosterone antagonist : will consider if changes or worsening symptoms.   - Blood pressure : controlled   - Reinforced HF education provided today, reviewed low sodium diet and when to notify CORE clinic.  2. Chronic atrial fibrillation s/p AV node ablation 1/2019 - stable, asymptomatic. Patient reporting labile INR recently, reviewed consideration for one of the new noval anticoagulations (eliquis/xarelto), patient notes she wishes to think about. Advised to call clinic if she is interested in changing.    - Continue routine follow up with EP and device clinic   - [id number] score 3 (age, female, HF)   - Continue warfarin for thromboembolic prophylaxis  3. Emergent aortic dissection repair 1/4/19   - CV surgery recommends repeat CT 6 months after surgery for surveillance - completed 8/7/2019 which was stable, showing similar  appearance as post repair.   4. Hypertension - controlled, continue current medications  5. Obesity - counseled patient on weight loss strategies.  6. Mild mitral regurgitation  7. Moderate tricuspid regurgitation      Changes today: none    Follow up plan:     CORE follow up in 2 months          History of Presenting Illness:    Priya Funez is a very pleasant 78 year old female with a history of HFpEF, atrial fibrillation s/p AV leo ablation with PPM implantation 1/11/2019, emergent aortic dissection repair 1/4/19, hypertension and obesity.     Patient presented in January with dissecting aortic aneurysm, she was hospitalized from 1/4/19-1/29/19. Patient had a complicated hospital course. There was prolonged ischemia to the SMA and right renal regions. She required tracheostomy placement and was discharged to [facility] [facility] on ventilatory support. Patients tracheostomy has been discontinued and she transitioned home the beginning of June.     She was seen by Dr. Morales on 7/1/19 at which point patient was noted to be volume up on exam. Recommended increasing her torsemide to 20mg BID. Lymphedema clinic referral placed and CORE consult ordered. I first met patient for CORE enrollment 7/9/19. She has been following closely in CORE clinic since.     Echocardiogram 6/26/19 showed LVEF 60-65%, LV normal size/function, RV mild to moderately dilated, RV systolic function mildly decreased, mild MR, moderate tricuspid regurgitation.  She underwent surveillance CT on 8/7/2019 for aortic dissection repair which was stable, showing similar appearance as post repair.     Patient is here today for CORE followup.     Patient reports feeling good. Monitoring weights most days at home. Weight at home today 150#, which she states has been stable, noting that it was recorded wrong at 155# today. Also states when she stood on clinic here her weight was 150.7#. Denies lower extremity edema. Denies abdominal distention/bloating. Denies  shortness of breath at rest. Will note occasional exertional dyspnea with activity, I.e. going up stairs. Able to complete ADLS and IADLs independently with no dyspnea. Sleeping good at night. Denies PND/orthopnea. Energy levels are good. Patient denies chest pain or chest tightness. Denies dizziness, lightheadedness or other presyncopal symptoms. Denies tachycardia or palpitations. Taking medications daily but notes she is only taking her potassium powder replacement 2-3x per week.     Labs today show stable kidney function and electrolytes, creatinine 1.25, potassium 4.4. Blood pressure 136/82 and HR 92 in clinic today.    Appetite good. Eating 1/2 meals out and 1/2 meals at home. Not adding additional salt to foods. Typically eating at [employer], [employer], [employer]. Enjoys cottage cheese. Typically eating a piece of grilled meat and frozen vegetables. Walking in the mall 2-3 days per week for activity. Patient also notes getting exercise with her errands and going to stores. Rare alcohol use. Denies tobacco use.         Recent Hospitalizations   1/4/19-1/29/19 r/t dissecting aortic aneurysm, underwent emergent repair 1/4/19. Patient had a complicated hospital course. There was prolonged ischemia to the SMA and right renal regions. She required tracheostomy placement and was discharged to [facility] on ventilatory support.         Social History     55 years, 4 children, 8 grandchildren, enjoys her gardens in the summer.   Social History     Socioeconomic History     Marital status:      Spouse name: Not on file     Number of children: Not on file     Years of education: Not on file     Highest education level: Not on file   Occupational History     Not on file   Social Needs     Financial resource strain: Not on file     Food insecurity:     Worry: Not on file     Inability: Not on file     Transportation needs:     Medical: Not on file     Non-medical: Not on file   Tobacco Use     Smoking status: Never  "Smoker     Smokeless tobacco: Never Used     Tobacco comment: hisband   Substance and Sexual Activity     Alcohol use: No     Frequency: Never     Comment: no alcohol 7/9/19     Drug use: No     Sexual activity: Not on file   Lifestyle     Physical activity:     Days per week: Not on file     Minutes per session: Not on file     Stress: Not on file   Relationships     Social connections:     Talks on phone: Not on file     Gets together: Not on file     Attends Congregational service: Not on file     Active member of club or organization: Not on file     Attends meetings of clubs or organizations: Not on file     Relationship status: Not on file     Intimate partner violence:     Fear of current or ex partner: Not on file     Emotionally abused: Not on file     Physically abused: Not on file     Forced sexual activity: Not on file   Other Topics Concern     Parent/sibling w/ CABG, MI or angioplasty before 65F 55M? Not Asked   Social History Narrative     Not on file            Review of Systems:   Skin:  Negative     Eyes:  Negative    ENT:  Negative    Respiratory:  Positive for dyspnea on exertion;cough  Cardiovascular:    Positive for  Gastroenterology: Negative for melena;hematochezia  Genitourinary:  Positive for urinary frequency  Musculoskeletal:  Positive for arthritis;joint pain  Neurologic:  Negative    Psychiatric:  Positive for anxiety;depression  Heme/Lymph/Imm:  Positive for allergies  Endocrine:  Negative           Physical Exam:   Vitals: /82   Pulse 92   Ht 1.613 m (5' 3.5\")   Wt 71.2 kg (157 lb)   SpO2 96%   BMI 27.38 kg/m     Wt Readings from Last 4 Encounters:   10/15/19 71.2 kg (157 lb)   08/02/19 68.7 kg (151 lb 6.4 oz)   07/30/19 70.3 kg (155 lb)   07/19/19 70.3 kg (155 lb)     GEN: well nourished, in no acute distress.  HEENT:  Pupils equal, round. Sclerae nonicteric.   NECK: Supple, no masses appreciated. JVD slightly elevated (known TR)  C/V:  Regular rate and rhythm, no murmur, rub " or gallop.    RESP: Respirations are unlabored. Clear to auscultation bilaterally without wheezing, rales, or rhonchi.  GI: Abdomen soft, nontender.  EXTREM: No edema.   NEURO: Alert and oriented, cooperative.  SKIN: Warm and dry.        Data:   ECHO 6/26/19  The left ventricle is normal in size.  Left ventricular systolic function is normal.  The visual ejection fraction is estimated at 60-65%.  Normal left ventricular wall motion  The right ventricle is mild to moderately dilated.  Mildly decreased right ventricular systolic function  There is mild (1+) mitral regurgitation.  There is moderate (2+) tricuspid regurgitation.  IVC diameter and respiratory changes fall into an intermediate range  suggesting an RA pressure of 8 mmHg.  Right ventricular systolic pressure is elevated, consistent with mild  pulmonary hypertension.     Since the previous study, the RV now appears larger with mildly decreased  function. TR has increased.    LIPID RESULTS:  Lab Results   Component Value Date    TRIG 96 01/28/2019     LIVER ENZYME RESULTS:  Lab Results   Component Value Date    AST 27 04/04/2019    ALT 19 04/04/2019     CBC RESULTS:  Lab Results   Component Value Date    WBC 6.7 04/04/2019    RBC 3.70 (L) 04/04/2019    HGB 9.1 (L) 04/04/2019    HCT 31.7 (L) 04/04/2019    MCV 86 04/04/2019    MCH 24.6 (L) 04/04/2019    MCHC 28.7 (L) 04/04/2019    RDW 17.5 (H) 04/04/2019     04/04/2019     BMP RESULTS:  Lab Results   Component Value Date     (L) 10/15/2019    POTASSIUM 4.4 10/15/2019    CHLORIDE 99 10/15/2019    CO2 29 10/15/2019    ANIONGAP 11.4 10/15/2019    GLC 84 10/15/2019    BUN 28 10/15/2019    CR 1.25 10/15/2019    GFRESTIMATED 42 (L) 10/15/2019    GFRESTBLACK 50 (L) 10/15/2019    BESS 9.9 10/15/2019      A1C RESULTS:  Lab Results   Component Value Date    A1C 5.3 01/04/2019     INR RESULTS:  Lab Results   Component Value Date    INR 2.66 (H) 04/04/2019    INR 2.32 (H) 01/29/2019            Medications      Current Outpatient Medications   Medication Sig Dispense Refill     aspirin (ASA) 81 MG EC tablet Take 81 mg by mouth every 24 hours       cholecalciferol (VITAMIN D-1000 MAX ST) 1000 units TABS Take 1,000 Units by mouth daily        cyanocobalamin (VITAMIN B-12) 100 MCG tablet Take 2,000 mcg by mouth daily       escitalopram (LEXAPRO) 10 MG tablet Take 10 mg by mouth daily       metoprolol tartrate (LOPRESSOR) 25 MG tablet Take 1 tablet (25 mg) by mouth 2 times daily 180 tablet 3     mirtazapine (REMERON) 15 MG tablet Take 15 mg by mouth       torsemide (DEMADEX) 20 MG tablet Take 1/2 tablet daily = 10mg daily 45 tablet 3     warfarin (COUMADIN) 2 MG tablet Take 1 tablet (2 mg) by mouth daily (Patient taking differently: Take 3 mg by mouth daily Taking 3mg M,W,S,HATCH, and 4mg Tues and Thurs.)       potassium chloride (KLOR-CON) 20 MEQ packet Take 20 mEq by mouth daily        Warfarin Therapy Reminder 1 each continuous prn            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, s/p Replacement of the ascending aorta with a 34 mm Hemashield branch graft 1/4/19     Heart failure (H)      Hypertension      Persistent atrial fibrillation (H)     post-op 1/2019: AV node ablation and single chamber pacemaker implanted 1/11/2019     Pleural effusion, bilateral     s/p left thoracentesis of 150cc 1/11/19;  right thoracentesis of 400cc 1/24/2019     Past Surgical History:   Procedure Laterality Date     ANGIOGRAM Right 1/4/2019    Procedure: DIAGONSTIC ANGIOGRAM OF SMA;  Surgeon: Rigo Jennings MD;  Location: [facility] OR     BYPASS GRAFT ARTERY CORONARY, REPAIR VALVE AORTIC, COMBINED N/A 1/4/2019    Procedure: AORTIC DISSECTION REPAIR WITH GRAFT- HEMASHIELD PLATINUM WOVEN DOUBLE VELOR 4 SIDE ARM VASCULAR GRAFT, D:35 X 12 X 10 X 10MM/ L:50CM;  Surgeon: Jose Winslow MD;  Location: [facility] OR     EP ABLATION AV NODE N/A 1/11/2019     Procedure: EP Ablation AV Node;  Surgeon: Tsering Davey MD;  Location:  [facility]     EP PACEMAKER Left 1/11/2019    Procedure: EP Pacemaker;  Surgeon: Tsering Davey MD;  Location:  [facility]ATH [facility]     TRACHEOSTOMY N/A 1/25/2019    Procedure: TRACHEOSTOMY  (DR MCCLELLAND);  Surgeon: Bryson Mcclelland MD;  Location:  OR            Allergies   Amoxicillin and Ciprofloxacin        TAMRA Ridley [facility]  Pager: [phone]

## 2019-11-04 LAB
MDC_IDC_LEAD_IMPLANT_DT: NORMAL
MDC_IDC_LEAD_LOCATION: NORMAL
MDC_IDC_LEAD_LOCATION_DETAIL_1: NORMAL
MDC_IDC_LEAD_MFG: NORMAL
MDC_IDC_LEAD_MODEL: NORMAL
MDC_IDC_LEAD_POLARITY_TYPE: NORMAL
MDC_IDC_LEAD_SERIAL: NORMAL
MDC_IDC_MSMT_BATTERY_DTM: NORMAL
MDC_IDC_MSMT_BATTERY_REMAINING_LONGEVITY: 148 MO
MDC_IDC_MSMT_BATTERY_RRT_TRIGGER: 2.62
MDC_IDC_MSMT_BATTERY_STATUS: NORMAL
MDC_IDC_MSMT_BATTERY_VOLTAGE: 3.05 V
MDC_IDC_MSMT_LEADCHNL_RV_IMPEDANCE_VALUE: 323 OHM
MDC_IDC_MSMT_LEADCHNL_RV_IMPEDANCE_VALUE: 399 OHM
MDC_IDC_MSMT_LEADCHNL_RV_PACING_THRESHOLD_AMPLITUDE: 0.62 V
MDC_IDC_MSMT_LEADCHNL_RV_PACING_THRESHOLD_PULSEWIDTH: 0.4 MS
MDC_IDC_MSMT_LEADCHNL_RV_SENSING_INTR_AMPL: 7.12 MV
MDC_IDC_MSMT_LEADCHNL_RV_SENSING_INTR_AMPL: 7.12 MV
MDC_IDC_PG_IMPLANT_DTM: NORMAL
MDC_IDC_PG_MFG: NORMAL
MDC_IDC_PG_MODEL: NORMAL
MDC_IDC_PG_SERIAL: NORMAL
MDC_IDC_PG_TYPE: NORMAL
MDC_IDC_SESS_CLINIC_NAME: NORMAL
MDC_IDC_SESS_DTM: NORMAL
MDC_IDC_SESS_TYPE: NORMAL
MDC_IDC_SET_BRADY_HYSTRATE: NORMAL
MDC_IDC_SET_BRADY_LOWRATE: 60 {BEATS}/MIN
MDC_IDC_SET_BRADY_MAX_SENSOR_RATE: 120 {BEATS}/MIN
MDC_IDC_SET_BRADY_MODE: NORMAL
MDC_IDC_SET_LEADCHNL_RV_PACING_AMPLITUDE: 2 V
MDC_IDC_SET_LEADCHNL_RV_PACING_ANODE_ELECTRODE_1: NORMAL
MDC_IDC_SET_LEADCHNL_RV_PACING_ANODE_LOCATION_1: NORMAL
MDC_IDC_SET_LEADCHNL_RV_PACING_CAPTURE_MODE: NORMAL
MDC_IDC_SET_LEADCHNL_RV_PACING_CATHODE_ELECTRODE_1: NORMAL
MDC_IDC_SET_LEADCHNL_RV_PACING_CATHODE_LOCATION_1: NORMAL
MDC_IDC_SET_LEADCHNL_RV_PACING_POLARITY: NORMAL
MDC_IDC_SET_LEADCHNL_RV_PACING_PULSEWIDTH: 0.4 MS
MDC_IDC_SET_LEADCHNL_RV_SENSING_ANODE_ELECTRODE_1: NORMAL
MDC_IDC_SET_LEADCHNL_RV_SENSING_ANODE_LOCATION_1: NORMAL
MDC_IDC_SET_LEADCHNL_RV_SENSING_CATHODE_ELECTRODE_1: NORMAL
MDC_IDC_SET_LEADCHNL_RV_SENSING_CATHODE_LOCATION_1: NORMAL
MDC_IDC_SET_LEADCHNL_RV_SENSING_POLARITY: NORMAL
MDC_IDC_SET_LEADCHNL_RV_SENSING_SENSITIVITY: 0.9 MV
MDC_IDC_SET_ZONE_DETECTION_INTERVAL: 360 MS
MDC_IDC_SET_ZONE_TYPE: NORMAL
MDC_IDC_STAT_BRADY_DTM_END: NORMAL
MDC_IDC_STAT_BRADY_DTM_START: NORMAL
MDC_IDC_STAT_BRADY_RV_PERCENT_PACED: 99.99 %
MDC_IDC_STAT_EPISODE_RECENT_COUNT: 0
MDC_IDC_STAT_EPISODE_RECENT_COUNT: 0
MDC_IDC_STAT_EPISODE_RECENT_COUNT_DTM_END: NORMAL
MDC_IDC_STAT_EPISODE_RECENT_COUNT_DTM_END: NORMAL
MDC_IDC_STAT_EPISODE_RECENT_COUNT_DTM_START: NORMAL
MDC_IDC_STAT_EPISODE_RECENT_COUNT_DTM_START: NORMAL
MDC_IDC_STAT_EPISODE_TOTAL_COUNT: 0
MDC_IDC_STAT_EPISODE_TOTAL_COUNT: 0
MDC_IDC_STAT_EPISODE_TOTAL_COUNT_DTM_END: NORMAL
MDC_IDC_STAT_EPISODE_TOTAL_COUNT_DTM_END: NORMAL
MDC_IDC_STAT_EPISODE_TOTAL_COUNT_DTM_START: NORMAL
MDC_IDC_STAT_EPISODE_TOTAL_COUNT_DTM_START: NORMAL
MDC_IDC_STAT_EPISODE_TYPE: NORMAL

## 2019-12-19 ENCOUNTER — OFFICE VISIT (OUTPATIENT)
Dept: CARDIOLOGY | Facility: CLINIC | Age: 79
End: 2019-12-19
Attending: NURSE PRACTITIONER
Payer: COMMERCIAL

## 2019-12-19 VITALS
BODY MASS INDEX: 25.7 KG/M2 | HEIGHT: 64 IN | WEIGHT: 150.5 LBS | HEART RATE: 86 BPM | SYSTOLIC BLOOD PRESSURE: 135 MMHG | DIASTOLIC BLOOD PRESSURE: 83 MMHG

## 2019-12-19 DIAGNOSIS — I48.19 PERSISTENT ATRIAL FIBRILLATION (H): ICD-10-CM

## 2019-12-19 DIAGNOSIS — I10 BENIGN ESSENTIAL HYPERTENSION: ICD-10-CM

## 2019-12-19 DIAGNOSIS — I50.32 CHRONIC DIASTOLIC CONGESTIVE HEART FAILURE (H): ICD-10-CM

## 2019-12-19 DIAGNOSIS — I50.32 CHRONIC DIASTOLIC CONGESTIVE HEART FAILURE (H): Primary | ICD-10-CM

## 2019-12-19 DIAGNOSIS — I71.010 ASCENDING AORTIC DISSECTION (H): ICD-10-CM

## 2019-12-19 LAB
ANION GAP SERPL CALCULATED.3IONS-SCNC: 3 MMOL/L (ref 3–14)
BUN SERPL-MCNC: 32 MG/DL (ref 7–30)
CALCIUM SERPL-MCNC: 9.3 MG/DL (ref 8.5–10.1)
CHLORIDE SERPL-SCNC: 106 MMOL/L (ref 94–109)
CO2 SERPL-SCNC: 30 MMOL/L (ref 20–32)
CREAT SERPL-MCNC: 1.14 MG/DL (ref 0.52–1.04)
GFR SERPL CREATININE-BSD FRML MDRD: 46 ML/MIN/{1.73_M2}
GLUCOSE SERPL-MCNC: 85 MG/DL (ref 70–99)
POTASSIUM SERPL-SCNC: 5 MMOL/L (ref 3.4–5.3)
SODIUM SERPL-SCNC: 139 MMOL/L (ref 133–144)

## 2019-12-19 PROCEDURE — 99214 OFFICE O/P EST MOD 30 MIN: CPT | Performed by: NURSE PRACTITIONER

## 2019-12-19 PROCEDURE — 36415 COLL VENOUS BLD VENIPUNCTURE: CPT | Performed by: NURSE PRACTITIONER

## 2019-12-19 PROCEDURE — 80048 BASIC METABOLIC PNL TOTAL CA: CPT | Performed by: NURSE PRACTITIONER

## 2019-12-19 ASSESSMENT — MIFFLIN-ST. JEOR: SCORE: 1144.72

## 2019-12-19 NOTE — ADDENDUM NOTE
Encounter addended by: Deborah Tyler, OT on: 12/19/2019 11:43 AM   Actions taken: Clinical Note Signed, Flowsheet accepted, Episode resolved

## 2019-12-19 NOTE — PATIENT INSTRUCTIONS
Call CORE nurse for any questions or concerns Mon-Fri 8am-4pm:                                                [phone]                                       For concerns after hours:                                                 [phone]     Medication changes: STOP potassium   Plan from today:    - Repeat labs to check potassium levels in 2 weeks   - Follow up with Dr. Morales in 3 months  Lab results: see attached; stable kidney function and electrolytes  Component      Latest Ref Rng & Units 10/15/2019 12/19/2019   Sodium      133 - 144 mmol/L 135 (L) 139   Potassium      3.4 - 5.3 mmol/L 4.4 5.0   Chloride      94 - 109 mmol/L 99 106   Carbon Dioxide      20 - 32 mmol/L 29 30   Anion Gap      3 - 14 mmol/L 11.4 3   Glucose      70 - 99 mg/dL 84 85   Urea Nitrogen      7 - 30 mg/dL 28 32 (H)   Creatinine      0.52 - 1.04 mg/dL 1.25 1.14 (H)   GFR Estimate      >60 mL/min/1.73:m2 42 (L) 46 (L)   GFR Estimate If Black      >60 mL/min/1.73:m2 50 (L) 54 (L)   Calcium      8.5 - 10.1 mg/dL 9.9 9.3

## 2019-12-19 NOTE — LETTER
12/19/2019    Alecia Millan MD, MD  [employer] [address]  [facility] [address]    RE: Priya Funez       Dear Colleague,    I had the pleasure of seeing Priya Funez in the [facility].    Cardiology Clinic Progress Note  Priya Funez MRN# [id number]   YOB: 1940 Age: 79 year old   Primary Cardiologist: Dr. Morales Reason for visit: CORE follow up            Assessment and Plan:   Priya Funez is a very pleasant 78 year old female with a history of HFpEF, atrial fibrillation s/p AV leo ablation with PPM implantation 1/11/2019, emergent aortic dissection repair 1/4/19, hypertension and obesity. Patient here today for CORE follow up.       1.  Chronic diastolic heart failure/HFpEF :  LVEF 60-65%, LV normal size/function, RV mild to moderately dilated, RV systolic function mildly decreased. Patient appears compensated and  euvolemic. Patient has been monitoring weight at home, states weight has been stable at 150#. HF symptoms well controlled, some complaints of exertional dyspnea which appear to be more consistent with deconditioning. Labs from today show stable kidney function and electrolytes, potassium 5.0 today, patient has been taking potassium 3 days per week, given high normal potassium today recommended stopping potassium supplement with recheck of labs in 2 weeks. Plan to continue current diuretic regimen.    - NYHA class III, stage C   - Fluid status :  euvolemic   - Diuretic regimen : torsemide 10mg daily   - Aldosterone antagonist : will consider if changes or worsening symptoms.   - Blood pressure : controlled   - Reinforced HF education provided today, reviewed low sodium diet and when to notify CORE clinic.  2. Chronic atrial fibrillation s/p AV node ablation 1/2019 - stable, asymptomatic. Labile INR in the past, patient notes this has improved and she wishes to stay on warfarin.    - Continue routine follow up with EP and device  clinic   - [id number] score 3 (age, female, HF)   - Continue warfarin for thromboembolic prophylaxis  3. Emergent aortic dissection repair 1/4/19   - Repeat CT 6 months after surgery for surveillance completed 8/7/2019 which was stable, showing similar appearance as post repair.   4. Hypertension - controlled, continue current medications  5. Obesity - counseled patient on weight loss strategies.  6. Mild mitral regurgitation  7. Moderate tricuspid regurgitation      Changes today: STOP potassium supplement    Follow up plan:     BMP in 2 weeks to evaluate potassium level after stopping potassium supplement    Follow up with Dr. Morales in 3 months          History of Presenting Illness:    Priya Funez is a very pleasant 78 year old female with a history of HFpEF, atrial fibrillation s/p AV leo ablation with PPM implantation 1/11/2019, emergent aortic dissection repair 1/4/19, hypertension and obesity.     Patient presented in January with dissecting aortic aneurysm, she was hospitalized from 1/4/19-1/29/19. Patient had a complicated hospital course. There was prolonged ischemia to the SMA and right renal regions. She required tracheostomy placement and was discharged to [facility] on ventilatory support. Patients tracheostomy has been discontinued and she transitioned home the beginning of June.     She was seen by Dr. Morales on 7/1/19 at which point patient was noted to be volume up on exam. Recommended increasing her torsemide to 20mg BID. Lymphedema clinic referral placed and CORE consult ordered. I first met patient for CORE enrollment 7/9/19. She has been following closely in CORE clinic since. During her last CORE visit in October she was doing well, no medications were changed and 2 month follow up was recommended.     Echocardiogram 6/26/19 showed LVEF 60-65%, LV normal size/function, RV mild to moderately dilated, RV systolic function mildly decreased, mild MR, moderate tricuspid regurgitation.  She  underwent surveillance CT on 8/7/2019 for aortic dissection repair which was stable, showing similar appearance as post repair.     Patient is here today for CORE followup.     Patient reports feeling good. Monitoring weights most days at home. Weight at home today 150#, which she states has been stable. Denies lower extremity edema. Denies abdominal distention/bloating. Denies shortness of breath at rest. Complaints of exertional dyspnea with activity, I.e. going up stairs. Able to complete ADLS and IADLs independently with no dyspnea. Sleeping good at night. Denies PND/orthopnea. Energy levels are good. Patient denies chest pain or chest tightness. Denies dizziness, lightheadedness or other presyncopal symptoms. Denies tachycardia or palpitations. Patient had multiple questions about her prolonged/complicated hospitalization in January, we spent a long amount of time reviewing what a dissecting aortic aneurysm is, what occurred during her stay and what procedures were completed.     Labs today show stable kidney function and electrolytes, creatinine 1.14, potassium 5. Blood pressure 135/83 and HR 86 in clinic today.    Appetite has been improving. Eating 1/2 meals out and 1/2 meals at home. Not adding additional salt to foods. Typically eating at [employer], [employer], [employer]. Trying not to buy soup or other canned food items. No set exercise routine, but getting activity with walking in stores. Patient also notes getting exercise with her errands and going to stores and doing household chores. Rare alcohol use. Denies tobacco use.         Recent Hospitalizations   1/4/19-1/29/19 r/t dissecting aortic aneurysm, underwent emergent repair 1/4/19. Patient had a complicated hospital course. There was prolonged ischemia to the SMA and right renal regions. She required tracheostomy placement and was discharged to [place] on ventilatory support.         Social History     55 years, 4 children, 8 grandchildren,  enjoys her gardens in the summer.   Social History     Socioeconomic History     Marital status:      Spouse name: Not on file     Number of children: Not on file     Years of education: Not on file     Highest education level: Not on file   Occupational History     Not on file   Social Needs     Financial resource strain: Not on file     Food insecurity:     Worry: Not on file     Inability: Not on file     Transportation needs:     Medical: Not on file     Non-medical: Not on file   Tobacco Use     Smoking status: Never Smoker     Smokeless tobacco: Never Used     Tobacco comment: hisband   Substance and Sexual Activity     Alcohol use: No     Frequency: Never     Comment: no alcohol 7/9/19     Drug use: No     Sexual activity: Not on file   Lifestyle     Physical activity:     Days per week: Not on file     Minutes per session: Not on file     Stress: Not on file   Relationships     Social connections:     Talks on phone: Not on file     Gets together: Not on file     Attends [employer] service: Not on file     Active member of club or organization: Not on file     Attends meetings of clubs or organizations: Not on file     Relationship status: Not on file     Intimate partner violence:     Fear of current or ex partner: Not on file     Emotionally abused: Not on file     Physically abused: Not on file     Forced sexual activity: Not on file   Other Topics Concern     Parent/sibling w/ CABG, MI or angioplasty before 65F 55M? Not Asked   Social History Narrative     Not on file            Review of Systems:   Skin:  Negative     Eyes:  Negative    ENT:  Negative    Respiratory:  Positive for cough;shortness of breath  Cardiovascular:    Positive for;chest pain  Gastroenterology: Negative    Genitourinary:  Positive for urinary frequency  Musculoskeletal:  Positive for arthritis;joint pain  Neurologic:  Negative    Psychiatric:  Positive for anxiety;depression  Heme/Lymph/Imm:  Positive for  "allergies  Endocrine:  Negative           Physical Exam:   Vitals: /83   Pulse 86   Ht 1.613 m (5' 3.5\")   Wt 68.3 kg (150 lb 8 oz)   BMI 26.24 kg/m      Wt Readings from Last 4 Encounters:   12/19/19 68.3 kg (150 lb 8 oz)   10/15/19 71.2 kg (157 lb)   08/02/19 68.7 kg (151 lb 6.4 oz)   07/30/19 70.3 kg (155 lb)     GEN: well nourished, in no acute distress.  HEENT:  Pupils equal, round. Sclerae nonicteric.   NECK: Supple, no masses appreciated.   C/V:  Regular rate and rhythm, no murmur, rub or gallop.    RESP: Respirations are unlabored. Clear to auscultation bilaterally without wheezing, rales, or rhonchi.  GI: Abdomen soft, nontender.  EXTREM: No lower extremity edema.   NEURO: Alert and oriented, cooperative.  SKIN: Warm and dry.        Data:   ECHO 6/26/19  The left ventricle is normal in size.  Left ventricular systolic function is normal.  The visual ejection fraction is estimated at 60-65%.  Normal left ventricular wall motion  The right ventricle is mild to moderately dilated.  Mildly decreased right ventricular systolic function  There is mild (1+) mitral regurgitation.  There is moderate (2+) tricuspid regurgitation.  IVC diameter and respiratory changes fall into an intermediate range  suggesting an RA pressure of 8 mmHg.  Right ventricular systolic pressure is elevated, consistent with mild  pulmonary hypertension.     Since the previous study, the RV now appears larger with mildly decreased  function. TR has increased.    LIPID RESULTS:  Lab Results   Component Value Date    TRIG 96 01/28/2019     LIVER ENZYME RESULTS:  Lab Results   Component Value Date    AST 27 04/04/2019    ALT 19 04/04/2019     CBC RESULTS:  Lab Results   Component Value Date    WBC 6.7 04/04/2019    RBC 3.70 (L) 04/04/2019    HGB 9.1 (L) 04/04/2019    HCT 31.7 (L) 04/04/2019    MCV 86 04/04/2019    MCH 24.6 (L) 04/04/2019    MCHC 28.7 (L) 04/04/2019    RDW 17.5 (H) 04/04/2019     04/04/2019     BMP RESULTS:  Lab " Results   Component Value Date     12/19/2019    POTASSIUM 5.0 12/19/2019    CHLORIDE 106 12/19/2019    CO2 30 12/19/2019    ANIONGAP 3 12/19/2019    GLC 85 12/19/2019    BUN 32 (H) 12/19/2019    CR 1.14 (H) 12/19/2019    GFRESTIMATED 46 (L) 12/19/2019    GFRESTBLACK 54 (L) 12/19/2019    BESS 9.3 12/19/2019      A1C RESULTS:  Lab Results   Component Value Date    A1C 5.3 01/04/2019     INR RESULTS:  Lab Results   Component Value Date    INR 2.66 (H) 04/04/2019    INR 2.32 (H) 01/29/2019            Medications     Current Outpatient Medications   Medication Sig Dispense Refill     acetaminophen (TYLENOL) 325 MG tablet Take 2 tablets (650 mg) by mouth every 4 hours as needed for other (multimodal surgical pain management along with NSAIDS and opioid medication as indicated based on pain control and physical function.)       aspirin (ASA) 81 MG EC tablet Take 81 mg by mouth every 24 hours       cholecalciferol (VITAMIN D-1000 MAX ST) 1000 units TABS Take 1,000 Units by mouth daily        cyanocobalamin (VITAMIN B-12) 100 MCG tablet Take 2,000 mcg by mouth daily       escitalopram (LEXAPRO) 10 MG tablet Take 10 mg by mouth daily       metoprolol tartrate (LOPRESSOR) 25 MG tablet Take 1 tablet (25 mg) by mouth 2 times daily 180 tablet 3     mirtazapine (REMERON) 15 MG tablet Take 15 mg by mouth       torsemide (DEMADEX) 20 MG tablet Take 1/2 tablet daily = 10mg daily 45 tablet 3     warfarin (COUMADIN) 2 MG tablet Take 1 tablet (2 mg) by mouth daily (Patient taking differently: Take 3 mg by mouth daily Taking 3mg M,W,S,HATCH, and 4mg Tues and Thurs.)       Warfarin Therapy Reminder 1 each continuous prn            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, s/p Replacement of the ascending aorta with a 34 mm Hemashield branch graft 1/4/19     Heart failure (H)      Hypertension      Persistent atrial fibrillation     post-op 1/2019: AV node  ablation and single chamber pacemaker implanted 1/11/2019     Pleural effusion, bilateral     s/p left thoracentesis of 150cc 1/11/19;  right thoracentesis of 400cc 1/24/2019     Past Surgical History:   Procedure Laterality Date     ANGIOGRAM Right 1/4/2019    Procedure: DIAGONSTIC ANGIOGRAM OF SMA;  Surgeon: Rigo Jennings MD;  Location:  OR     BYPASS GRAFT ARTERY CORONARY, REPAIR VALVE AORTIC, COMBINED N/A 1/4/2019    Procedure: AORTIC DISSECTION REPAIR WITH GRAFT- HEMASHIELD PLATINUM WOVEN DOUBLE VELOR 4 SIDE ARM VASCULAR GRAFT, D:35 X 12 X 10 X 10MM/ L:50CM;  Surgeon: Jose Winslow MD;  Location:  OR     EP ABLATION AV NODE N/A 1/11/2019    Procedure: EP Ablation AV Node;  Surgeon: Tsering Davey MD;  Location:  HEART CARDIAC CATH LAB     EP PACEMAKER Left 1/11/2019    Procedure: EP Pacemaker;  Surgeon: Tsering Davey MD;  Location:  HEART CARDIAC CATH LAB     TRACHEOSTOMY N/A 1/25/2019    Procedure: TRACHEOSTOMY  (DR MCCLELLAND);  Surgeon: Bryson Mcclelland MD;  Location:  OR            Allergies   Amoxicillin and Ciprofloxacin        TAMRA Ridley [facility]  Pager: [phone]      Thank you for allowing me to participate in the care of your patient.    Sincerely,     TAMRA Ridley CNP     [facility]

## 2019-12-19 NOTE — PROGRESS NOTES
12/19/19 1100   Signing Clinician's Name / Credentials   Signing clinician's name / credentials Tyrell Tyler OTR/MICHELA, CLT   Session Number   Session Number DISCHARGE NOTE   Comments   Comments Patient referred to edema treatment clinic to address BLE swelling, Complete Decongestive Therapy recommended.  Patient did not schedule recommended treatment sessions, unable to determine final progress toward goals, LLIS score.  Discharge from edema therapy.

## 2019-12-19 NOTE — PROGRESS NOTES
Cardiology Clinic Progress Note  Priya Funez MRN# [id number]   YOB: 1940 Age: 79 year old   Primary Cardiologist: Dr. Morales Reason for visit: CORE follow up            Assessment and Plan:   Priya Funez is a very pleasant 78 year old female with a history of HFpEF, atrial fibrillation s/p AV leo ablation with PPM implantation 1/11/2019, emergent aortic dissection repair 1/4/19, hypertension and obesity. Patient here today for CORE follow up.       1.  Chronic diastolic heart failure/HFpEF :  LVEF 60-65%, LV normal size/function, RV mild to moderately dilated, RV systolic function mildly decreased. Patient appears compensated and  euvolemic. Patient has been monitoring weight at home, states weight has been stable at 150#. HF symptoms well controlled, some complaints of exertional dyspnea which appear to be more consistent with deconditioning. Labs from today show stable kidney function and electrolytes, potassium 5.0 today, patient has been taking potassium 3 days per week, given high normal potassium today recommended stopping potassium supplement with recheck of labs in 2 weeks. Plan to continue current diuretic regimen.    - NYHA class III, stage C   - Fluid status :  euvolemic   - Diuretic regimen : torsemide 10mg daily   - Aldosterone antagonist : will consider if changes or worsening symptoms.   - Blood pressure : controlled   - Reinforced HF education provided today, reviewed low sodium diet and when to notify CORE clinic.  2. Chronic atrial fibrillation s/p AV node ablation 1/2019 - stable, asymptomatic. Labile INR in the past, patient notes this has improved and she wishes to stay on warfarin.    - Continue routine follow up with EP and device clinic   - [id number] score 3 (age, female, HF)   - Continue warfarin for thromboembolic prophylaxis  3. Emergent aortic dissection repair 1/4/19   - Repeat CT 6 months after surgery for surveillance completed 8/7/2019 which was stable,  showing similar appearance as post repair.   4. Hypertension - controlled, continue current medications  5. Obesity - counseled patient on weight loss strategies.  6. Mild mitral regurgitation  7. Moderate tricuspid regurgitation      Changes today: STOP potassium supplement    Follow up plan:     BMP in 2 weeks to evaluate potassium level after stopping potassium supplement    Follow up with Dr. Morales in 3 months          History of Presenting Illness:    Priya Funez is a very pleasant 78 year old female with a history of HFpEF, atrial fibrillation s/p AV leo ablation with PPM implantation 1/11/2019, emergent aortic dissection repair 1/4/19, hypertension and obesity.     Patient presented in January with dissecting aortic aneurysm, she was hospitalized from 1/4/19-1/29/19. Patient had a complicated hospital course. There was prolonged ischemia to the SMA and right renal regions. She required tracheostomy placement and was discharged to [facility] on ventilatory support. Patients tracheostomy has been discontinued and she transitioned home the beginning of June.     She was seen by Dr. Morales on 7/1/19 at which point patient was noted to be volume up on exam. Recommended increasing her torsemide to 20mg BID. Lymphedema clinic referral placed and CORE consult ordered. I first met patient for CORE enrollment 7/9/19. She has been following closely in CORE clinic since. During her last CORE visit in October she was doing well, no medications were changed and 2 month follow up was recommended.     Echocardiogram 6/26/19 showed LVEF 60-65%, LV normal size/function, RV mild to moderately dilated, RV systolic function mildly decreased, mild MR, moderate tricuspid regurgitation.  She underwent surveillance CT on 8/7/2019 for aortic dissection repair which was stable, showing similar appearance as post repair.     Patient is here today for CORE followup.     Patient reports feeling good. Monitoring weights most days at  home. Weight at home today 150#, which she states has been stable. Denies lower extremity edema. Denies abdominal distention/bloating. Denies shortness of breath at rest. Complaints of exertional dyspnea with activity, I.e. going up stairs. Able to complete ADLS and IADLs independently with no dyspnea. Sleeping good at night. Denies PND/orthopnea. Energy levels are good. Patient denies chest pain or chest tightness. Denies dizziness, lightheadedness or other presyncopal symptoms. Denies tachycardia or palpitations. Patient had multiple questions about her prolonged/complicated hospitalization in January, we spent a long amount of time reviewing what a dissecting aortic aneurysm is, what occurred during her stay and what procedures were completed.     Labs today show stable kidney function and electrolytes, creatinine 1.14, potassium 5. Blood pressure 135/83 and HR 86 in clinic today.    Appetite has been improving. Eating 1/2 meals out and 1/2 meals at home. Not adding additional salt to foods. Typically eating at [place], [employer], [employer]. Trying not to buy soup or other canned food items. No set exercise routine, but getting activity with walking in stores. Patient also notes getting exercise with her errands and going to stores and doing household chores. Rare alcohol use. Denies tobacco use.         Recent Hospitalizations   1/4/19-1/29/19 r/t dissecting aortic aneurysm, underwent emergent repair 1/4/19. Patient had a complicated hospital course. There was prolonged ischemia to the SMA and right renal regions. She required tracheostomy placement and was discharged to [place] on ventilatory support.         Social History     55 years, 4 children, 8 grandchildren, enjoys her gardens in the summer.   Social History     Socioeconomic History     Marital status:      Spouse name: Not on file     Number of children: Not on file     Years of education: Not on file     Highest education level: Not on  "file   Occupational History     Not on file   Social Needs     Financial resource strain: Not on file     Food insecurity:     Worry: Not on file     Inability: Not on file     Transportation needs:     Medical: Not on file     Non-medical: Not on file   Tobacco Use     Smoking status: Never Smoker     Smokeless tobacco: Never Used     Tobacco comment: hisband   Substance and Sexual Activity     Alcohol use: No     Frequency: Never     Comment: no alcohol 7/9/19     Drug use: No     Sexual activity: Not on file   Lifestyle     Physical activity:     Days per week: Not on file     Minutes per session: Not on file     Stress: Not on file   Relationships     Social connections:     Talks on phone: Not on file     Gets together: Not on file     Attends Scientologist service: Not on file     Active member of club or organization: Not on file     Attends meetings of clubs or organizations: Not on file     Relationship status: Not on file     Intimate partner violence:     Fear of current or ex partner: Not on file     Emotionally abused: Not on file     Physically abused: Not on file     Forced sexual activity: Not on file   Other Topics Concern     Parent/sibling w/ CABG, MI or angioplasty before 65F 55M? Not Asked   Social History Narrative     Not on file            Review of Systems:   Skin:  Negative     Eyes:  Negative    ENT:  Negative    Respiratory:  Positive for cough;shortness of breath  Cardiovascular:    Positive for;chest pain  Gastroenterology: Negative    Genitourinary:  Positive for urinary frequency  Musculoskeletal:  Positive for arthritis;joint pain  Neurologic:  Negative    Psychiatric:  Positive for anxiety;depression  Heme/Lymph/Imm:  Positive for allergies  Endocrine:  Negative           Physical Exam:   Vitals: /83   Pulse 86   Ht 1.613 m (5' 3.5\")   Wt 68.3 kg (150 lb 8 oz)   BMI 26.24 kg/m     Wt Readings from Last 4 Encounters:   12/19/19 68.3 kg (150 lb 8 oz)   10/15/19 71.2 kg (157 lb) "   08/02/19 68.7 kg (151 lb 6.4 oz)   07/30/19 70.3 kg (155 lb)     GEN: well nourished, in no acute distress.  HEENT:  Pupils equal, round. Sclerae nonicteric.   NECK: Supple, no masses appreciated.   C/V:  Regular rate and rhythm, no murmur, rub or gallop.    RESP: Respirations are unlabored. Clear to auscultation bilaterally without wheezing, rales, or rhonchi.  GI: Abdomen soft, nontender.  EXTREM: No lower extremity edema.   NEURO: Alert and oriented, cooperative.  SKIN: Warm and dry.        Data:   ECHO 6/26/19  The left ventricle is normal in size.  Left ventricular systolic function is normal.  The visual ejection fraction is estimated at 60-65%.  Normal left ventricular wall motion  The right ventricle is mild to moderately dilated.  Mildly decreased right ventricular systolic function  There is mild (1+) mitral regurgitation.  There is moderate (2+) tricuspid regurgitation.  IVC diameter and respiratory changes fall into an intermediate range  suggesting an RA pressure of 8 mmHg.  Right ventricular systolic pressure is elevated, consistent with mild  pulmonary hypertension.     Since the previous study, the RV now appears larger with mildly decreased  function. TR has increased.    LIPID RESULTS:  Lab Results   Component Value Date    TRIG 96 01/28/2019     LIVER ENZYME RESULTS:  Lab Results   Component Value Date    AST 27 04/04/2019    ALT 19 04/04/2019     CBC RESULTS:  Lab Results   Component Value Date    WBC 6.7 04/04/2019    RBC 3.70 (L) 04/04/2019    HGB 9.1 (L) 04/04/2019    HCT 31.7 (L) 04/04/2019    MCV 86 04/04/2019    MCH 24.6 (L) 04/04/2019    MCHC 28.7 (L) 04/04/2019    RDW 17.5 (H) 04/04/2019     04/04/2019     BMP RESULTS:  Lab Results   Component Value Date     12/19/2019    POTASSIUM 5.0 12/19/2019    CHLORIDE 106 12/19/2019    CO2 30 12/19/2019    ANIONGAP 3 12/19/2019    GLC 85 12/19/2019    BUN 32 (H) 12/19/2019    CR 1.14 (H) 12/19/2019    GFRESTIMATED 46 (L) 12/19/2019     GFRESTBLACK 54 (L) 12/19/2019    BESS 9.3 12/19/2019      A1C RESULTS:  Lab Results   Component Value Date    A1C 5.3 01/04/2019     INR RESULTS:  Lab Results   Component Value Date    INR 2.66 (H) 04/04/2019    INR 2.32 (H) 01/29/2019            Medications     Current Outpatient Medications   Medication Sig Dispense Refill     acetaminophen (TYLENOL) 325 MG tablet Take 2 tablets (650 mg) by mouth every 4 hours as needed for other (multimodal surgical pain management along with NSAIDS and opioid medication as indicated based on pain control and physical function.)       aspirin (ASA) 81 MG EC tablet Take 81 mg by mouth every 24 hours       cholecalciferol (VITAMIN D-1000 MAX ST) 1000 units TABS Take 1,000 Units by mouth daily        cyanocobalamin (VITAMIN B-12) 100 MCG tablet Take 2,000 mcg by mouth daily       escitalopram (LEXAPRO) 10 MG tablet Take 10 mg by mouth daily       metoprolol tartrate (LOPRESSOR) 25 MG tablet Take 1 tablet (25 mg) by mouth 2 times daily 180 tablet 3     mirtazapine (REMERON) 15 MG tablet Take 15 mg by mouth       torsemide (DEMADEX) 20 MG tablet Take 1/2 tablet daily = 10mg daily 45 tablet 3     warfarin (COUMADIN) 2 MG tablet Take 1 tablet (2 mg) by mouth daily (Patient taking differently: Take 3 mg by mouth daily Taking 3mg M,W,S,HATCH, and 4mg Tues and Thurs.)       Warfarin Therapy Reminder 1 each continuous prn            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, s/p Replacement of the ascending aorta with a 34 mm Hemashield branch graft 1/4/19     Heart failure (H)      Hypertension      Persistent atrial fibrillation     post-op 1/2019: AV node ablation and single chamber pacemaker implanted 1/11/2019     Pleural effusion, bilateral     s/p left thoracentesis of 150cc 1/11/19;  right thoracentesis of 400cc 1/24/2019     Past Surgical History:   Procedure Laterality Date     ANGIOGRAM Right 1/4/2019     Procedure: DIAGONSTIC ANGIOGRAM OF SMA;  Surgeon: Rigo Jennings MD;  Location:  OR     BYPASS GRAFT ARTERY CORONARY, REPAIR VALVE AORTIC, COMBINED N/A 1/4/2019    Procedure: AORTIC DISSECTION REPAIR WITH GRAFT- HEMASHIELD PLATINUM WOVEN DOUBLE VELOR 4 SIDE ARM VASCULAR GRAFT, D:35 X 12 X 10 X 10MM/ L:50CM;  Surgeon: Jose Winslow MD;  Location:  OR     EP ABLATION AV NODE N/A 1/11/2019    Procedure: EP Ablation AV Node;  Surgeon: Tsering Davey MD;  Location:  HEART CARDIAC CATH LAB     EP PACEMAKER Left 1/11/2019    Procedure: EP Pacemaker;  Surgeon: Tsering Davey MD;  Location:  HEART CARDIAC CATH LAB     TRACHEOSTOMY N/A 1/25/2019    Procedure: TRACHEOSTOMY  (DR MCCLELLAND);  Surgeon: Bryson Mcclelland MD;  Location:  OR            Allergies   Amoxicillin and Ciprofloxacin        TAM[facility]  Pager: [phone]

## 2020-01-09 DIAGNOSIS — I50.32 CHRONIC DIASTOLIC CONGESTIVE HEART FAILURE (H): ICD-10-CM

## 2020-01-09 LAB
ANION GAP SERPL CALCULATED.3IONS-SCNC: 11.9 MMOL/L (ref 6–17)
BUN SERPL-MCNC: 29 MG/DL (ref 7–30)
CALCIUM SERPL-MCNC: 9.6 MG/DL (ref 8.5–10.5)
CHLORIDE SERPL-SCNC: 103 MMOL/L (ref 98–107)
CO2 SERPL-SCNC: 29 MMOL/L (ref 23–29)
CREAT SERPL-MCNC: 1.15 MG/DL (ref 0.7–1.3)
GFR SERPL CREATININE-BSD FRML MDRD: 46 ML/MIN/{1.73_M2}
GLUCOSE SERPL-MCNC: 89 MG/DL (ref 70–105)
POTASSIUM SERPL-SCNC: 4.9 MMOL/L (ref 3.5–5.1)
SODIUM SERPL-SCNC: 139 MMOL/L (ref 136–145)

## 2020-01-09 PROCEDURE — 80048 BASIC METABOLIC PNL TOTAL CA: CPT | Performed by: NURSE PRACTITIONER

## 2020-01-09 PROCEDURE — 36415 COLL VENOUS BLD VENIPUNCTURE: CPT | Performed by: NURSE PRACTITIONER

## 2020-01-13 ENCOUNTER — CARE COORDINATION (OUTPATIENT)
Dept: CARDIOLOGY | Facility: CLINIC | Age: 80
End: 2020-01-13

## 2020-01-13 DIAGNOSIS — I51.9 RIGHT VENTRICULAR DYSFUNCTION: ICD-10-CM

## 2020-01-13 DIAGNOSIS — I27.20 PULMONARY HYPERTENSION (H): Primary | ICD-10-CM

## 2020-01-13 NOTE — PROGRESS NOTES
I don't think patient would qualify for cardiac rehab. She may qualify for pulmonary rehab given her RV dysfunction and mild pulmonary hypertension. I placed an order to see. Otherwise [facility] program referral would be an option but there would be an out of pocket expense.

## 2020-01-13 NOTE — PROGRESS NOTES
Reviewed lab results, potassium stable after stopping potassium supplement. Kidney function stable.   Can you please call patient to review lab results. Thanks. Pablo       Called patient and left voice mail to go over lab results.     KIKE Diaz January 13, 2020 11:10 AM

## 2020-01-13 NOTE — PROGRESS NOTES
Received voice mail back from patient regarding lab work. Called patient back and went over recommendations going forward. Patient is wondering about doing Cardiac rehab and whether or not this would be appropriate for her. Will forward to provider for review.       Future Appointments   Date Time Provider Department Center   1/22/2020 12:00 AM [facility] [address] [facility]   3/19/2020  3:45 PM Benjamin Morales MD [facility] [place] [facility]            KIKE Diaz January 13, 2020 1:31 PM

## 2020-01-15 NOTE — PROGRESS NOTES
Called pt and left detailed VM noting that Xochitl Berman CNP placed order for pulm rehab.     I can see in orders that rehab has contacted pt to schedule, and she told them she'd call them back to make arrangements after confirming insurance coverage.    Raegan Maloney RN BSN   1:01 PM 01/15/20

## 2020-01-22 ENCOUNTER — ANCILLARY PROCEDURE (OUTPATIENT)
Dept: CARDIOLOGY | Facility: CLINIC | Age: 80
End: 2020-01-22
Attending: INTERNAL MEDICINE
Payer: COMMERCIAL

## 2020-01-22 DIAGNOSIS — Z95.0 PACEMAKER: ICD-10-CM

## 2020-01-22 PROCEDURE — 93294 REM INTERROG EVL PM/LDLS PM: CPT | Performed by: INTERNAL MEDICINE

## 2020-01-22 PROCEDURE — 93296 REM INTERROG EVL PM/IDS: CPT | Performed by: INTERNAL MEDICINE

## 2020-02-04 LAB
MDC_IDC_LEAD_IMPLANT_DT: NORMAL
MDC_IDC_LEAD_LOCATION: NORMAL
MDC_IDC_LEAD_LOCATION_DETAIL_1: NORMAL
MDC_IDC_LEAD_MFG: NORMAL
MDC_IDC_LEAD_MODEL: NORMAL
MDC_IDC_LEAD_POLARITY_TYPE: NORMAL
MDC_IDC_LEAD_SERIAL: NORMAL
MDC_IDC_MSMT_BATTERY_DTM: NORMAL
MDC_IDC_MSMT_BATTERY_REMAINING_LONGEVITY: 145 MO
MDC_IDC_MSMT_BATTERY_RRT_TRIGGER: 2.62
MDC_IDC_MSMT_BATTERY_STATUS: NORMAL
MDC_IDC_MSMT_BATTERY_VOLTAGE: 3.02 V
MDC_IDC_MSMT_LEADCHNL_RV_IMPEDANCE_VALUE: 342 OHM
MDC_IDC_MSMT_LEADCHNL_RV_IMPEDANCE_VALUE: 399 OHM
MDC_IDC_MSMT_LEADCHNL_RV_PACING_THRESHOLD_AMPLITUDE: 0.5 V
MDC_IDC_MSMT_LEADCHNL_RV_PACING_THRESHOLD_PULSEWIDTH: 0.4 MS
MDC_IDC_MSMT_LEADCHNL_RV_SENSING_INTR_AMPL: 7.12 MV
MDC_IDC_MSMT_LEADCHNL_RV_SENSING_INTR_AMPL: 7.12 MV
MDC_IDC_PG_IMPLANT_DTM: NORMAL
MDC_IDC_PG_MFG: NORMAL
MDC_IDC_PG_MODEL: NORMAL
MDC_IDC_PG_SERIAL: NORMAL
MDC_IDC_PG_TYPE: NORMAL
MDC_IDC_SESS_CLINIC_NAME: NORMAL
MDC_IDC_SESS_DTM: NORMAL
MDC_IDC_SESS_TYPE: NORMAL
MDC_IDC_SET_BRADY_HYSTRATE: NORMAL
MDC_IDC_SET_BRADY_LOWRATE: 60 {BEATS}/MIN
MDC_IDC_SET_BRADY_MAX_SENSOR_RATE: 120 {BEATS}/MIN
MDC_IDC_SET_BRADY_MODE: NORMAL
MDC_IDC_SET_LEADCHNL_RV_PACING_AMPLITUDE: 2 V
MDC_IDC_SET_LEADCHNL_RV_PACING_ANODE_ELECTRODE_1: NORMAL
MDC_IDC_SET_LEADCHNL_RV_PACING_ANODE_LOCATION_1: NORMAL
MDC_IDC_SET_LEADCHNL_RV_PACING_CAPTURE_MODE: NORMAL
MDC_IDC_SET_LEADCHNL_RV_PACING_CATHODE_ELECTRODE_1: NORMAL
MDC_IDC_SET_LEADCHNL_RV_PACING_CATHODE_LOCATION_1: NORMAL
MDC_IDC_SET_LEADCHNL_RV_PACING_POLARITY: NORMAL
MDC_IDC_SET_LEADCHNL_RV_PACING_PULSEWIDTH: 0.4 MS
MDC_IDC_SET_LEADCHNL_RV_SENSING_ANODE_ELECTRODE_1: NORMAL
MDC_IDC_SET_LEADCHNL_RV_SENSING_ANODE_LOCATION_1: NORMAL
MDC_IDC_SET_LEADCHNL_RV_SENSING_CATHODE_ELECTRODE_1: NORMAL
MDC_IDC_SET_LEADCHNL_RV_SENSING_CATHODE_LOCATION_1: NORMAL
MDC_IDC_SET_LEADCHNL_RV_SENSING_POLARITY: NORMAL
MDC_IDC_SET_LEADCHNL_RV_SENSING_SENSITIVITY: 0.9 MV
MDC_IDC_SET_ZONE_DETECTION_INTERVAL: 360 MS
MDC_IDC_SET_ZONE_TYPE: NORMAL
MDC_IDC_STAT_BRADY_DTM_END: NORMAL
MDC_IDC_STAT_BRADY_DTM_START: NORMAL
MDC_IDC_STAT_BRADY_RV_PERCENT_PACED: 99.99 %
MDC_IDC_STAT_EPISODE_RECENT_COUNT: 0
MDC_IDC_STAT_EPISODE_RECENT_COUNT: 0
MDC_IDC_STAT_EPISODE_RECENT_COUNT_DTM_END: NORMAL
MDC_IDC_STAT_EPISODE_RECENT_COUNT_DTM_END: NORMAL
MDC_IDC_STAT_EPISODE_RECENT_COUNT_DTM_START: NORMAL
MDC_IDC_STAT_EPISODE_RECENT_COUNT_DTM_START: NORMAL
MDC_IDC_STAT_EPISODE_TOTAL_COUNT: 0
MDC_IDC_STAT_EPISODE_TOTAL_COUNT: 0
MDC_IDC_STAT_EPISODE_TOTAL_COUNT_DTM_END: NORMAL
MDC_IDC_STAT_EPISODE_TOTAL_COUNT_DTM_END: NORMAL
MDC_IDC_STAT_EPISODE_TOTAL_COUNT_DTM_START: NORMAL
MDC_IDC_STAT_EPISODE_TOTAL_COUNT_DTM_START: NORMAL
MDC_IDC_STAT_EPISODE_TYPE: NORMAL

## 2020-03-19 ENCOUNTER — VIRTUAL VISIT (OUTPATIENT)
Dept: CARDIOLOGY | Facility: CLINIC | Age: 80
End: 2020-03-19
Attending: NURSE PRACTITIONER
Payer: COMMERCIAL

## 2020-03-19 VITALS
BODY MASS INDEX: 25.78 KG/M2 | WEIGHT: 151 LBS | DIASTOLIC BLOOD PRESSURE: 79 MMHG | HEIGHT: 64 IN | HEART RATE: 86 BPM | SYSTOLIC BLOOD PRESSURE: 122 MMHG

## 2020-03-19 DIAGNOSIS — I10 BENIGN ESSENTIAL HYPERTENSION: ICD-10-CM

## 2020-03-19 DIAGNOSIS — I48.19 PERSISTENT ATRIAL FIBRILLATION (H): ICD-10-CM

## 2020-03-19 DIAGNOSIS — I50.32 CHRONIC DIASTOLIC CONGESTIVE HEART FAILURE (H): Primary | ICD-10-CM

## 2020-03-19 DIAGNOSIS — I71.010 ASCENDING AORTIC DISSECTION (H): ICD-10-CM

## 2020-03-19 PROCEDURE — 99214 OFFICE O/P EST MOD 30 MIN: CPT | Mod: TEL | Performed by: INTERNAL MEDICINE

## 2020-03-19 RX ORDER — MIRTAZAPINE 15 MG/1
15 TABLET, FILM COATED ORAL DAILY
Qty: 90 TABLET | Refills: 3 | Status: CANCELLED | OUTPATIENT
Start: 2020-03-19

## 2020-03-19 RX ORDER — WARFARIN SODIUM 2 MG/1
TABLET ORAL DAILY
COMMUNITY
End: 2021-03-22

## 2020-03-19 RX ORDER — METOPROLOL TARTRATE 25 MG/1
25 TABLET, FILM COATED ORAL 2 TIMES DAILY
Qty: 180 TABLET | Refills: 3 | Status: SHIPPED | OUTPATIENT
Start: 2020-03-19 | End: 2021-03-17

## 2020-03-19 RX ORDER — TORSEMIDE 20 MG/1
TABLET ORAL
Qty: 45 TABLET | Refills: 3 | Status: SHIPPED | OUTPATIENT
Start: 2020-03-19 | End: 2020-11-06

## 2020-03-19 ASSESSMENT — MIFFLIN-ST. JEOR: SCORE: 1146.99

## 2020-03-19 NOTE — PROGRESS NOTES
"Priya Funez is a 79 year old female who is being evaluated via a billable telephone visit.      The patient has been notified of following:     \"This telephone visit will be conducted via a call between you and your physician/provider. We have found that certain health care needs can be provided without the need for a physical exam.  This service lets us provide the care you need with a short phone conversation.  If a prescription is necessary we can send it directly to your pharmacy.  If lab work is needed we can place an order for that and you can then stop by our lab to have the test done at a later time.    If during the course of the call the physician/provider feels a telephone visit is not appropriate, you will not be charged for this service.\"     Priya Funez is contacted for follow-up of prior type A aortic dissection with aortic root repair, chronic diastolic heart failure, chronic atrial fibrillation, sick sinus syndrome, status post AV node ablation and pacemaker placement, hypertension.    I have reviewed and updated the patient's Past Medical History, Social History, Family History and Medication List.    ALLERGIES  Amoxicillin and Ciprofloxacin     Patient will have BP and pulse for you when telephone visit begins.  Medications pended for refill.    CHIN Armstrong      Additional provider notes:    Patient continuing to improve.  Still deconditioned after her many months in the hospital and acute long-term care.  Tracheostomy has finally healed.  She is getting out and walking and notes is slowly improving stamina and tolerance.  Not noticing any significant dyspnea on exertion that bothers her or is getting worse.  Is not having orthopnea, PND, significant weight change.  Weight today at home was 151 pounds which is about our previous goal.  No orthostasis, palpitations, dizziness or syncope.  No back pain or other symptoms similar to her previous dissection.  She had aortic root " repair, she has a chronic dissection from the end of the repair down to the iliacs.  She takes her blood pressure fairly regularly.  Today it was 122/76 which is what she typically gets.  It is rarely if ever above this.  She is on chronic anticoagulation because of her atrial fibrillation and has had no bleeding issues.  She has had no falls.  Review of her last most recent pacemaker interrogation showed normal function, no ventricular arrhythmias, no high rates.    She notes her main limiting issue now is some chronic back pain.  She has some history of lumbar compression fracture.  I have encouraged her to talk with Dr. Millan regarding physical therapy approaches so she can continue to be more active.    In January 2019presentation in January 2019 she presented with with a type a dissecting aneurysm.  There was prolonged ischemia to the SMA and right renal regions at that time.  She required a tracheostomy and long-term TCU recovery.  She had aortic and a root and a sending aorta repair.    1.  Chronic diastolic heart failure/HFpEF :   On most recent echo from June 2019 LVEF 60-65%, LV normal size/function, RV mild to moderately dilated, RV systolic function mildly decreased.              - NYHA class III, stage C              - Fluid status :  euvolemic              - Diuretic regimen : torsemide 10mg daily-has been stable on this now for some time.              - Aldosterone antagonist : will consider if changes or worsening symptoms.              - Blood pressure : controlled              - Reinforced HF education provided today, reviewed low sodium diet and when to notify CORE clinic.    2. Chronic atrial fibrillation s/p AV node ablation 1/2019 - stable, asymptomatic. Labile INR in the past, patient notes this has improved and she wishes to stay on warfarin.   INRs followed with Dr. Millan.  Care everywhere shows only one subtherapeutic INR since October 2, 2019, at 1.8, and no high INRs.  No bleeding  episodes.              - Continue routine follow up with EP and device clinic              - [id number] score 3 (age, female, HF)              - Continue warfarin for thromboembolic prophylaxis    3. Emergent aortic dissection repair 1/4/19              - Repeat CT 6 months after surgery for surveillance completed 8/7/2019 which was stable, showing similar appearance as post repair.     4. Hypertension - controlled, continue current medications    Overall she is continued to steadily improve with improving functional status.  I would continue her current cardiovascular regimen and have renewed her torsemide and metoprolol today.  I would continue her current regimen from a cardiovascular standpoint.  If she continues to be stable I will have her back in 6 months.    I have reviewed and updated the patient's Past Medical History, Social History, Family History and Medication List.      Phone call duration: 15 minutes    Benjamin Morales MD

## 2020-05-05 ENCOUNTER — ANCILLARY PROCEDURE (OUTPATIENT)
Dept: CARDIOLOGY | Facility: CLINIC | Age: 80
End: 2020-05-05
Attending: INTERNAL MEDICINE
Payer: COMMERCIAL

## 2020-05-05 DIAGNOSIS — Z95.0 CARDIAC PACEMAKER IN SITU: ICD-10-CM

## 2020-05-05 PROCEDURE — 93294 REM INTERROG EVL PM/LDLS PM: CPT | Performed by: INTERNAL MEDICINE

## 2020-05-05 PROCEDURE — 93296 REM INTERROG EVL PM/IDS: CPT | Performed by: INTERNAL MEDICINE

## 2020-05-12 LAB
MDC_IDC_LEAD_IMPLANT_DT: NORMAL
MDC_IDC_LEAD_LOCATION: NORMAL
MDC_IDC_LEAD_LOCATION_DETAIL_1: NORMAL
MDC_IDC_LEAD_MFG: NORMAL
MDC_IDC_LEAD_MODEL: NORMAL
MDC_IDC_LEAD_POLARITY_TYPE: NORMAL
MDC_IDC_LEAD_SERIAL: NORMAL
MDC_IDC_MSMT_BATTERY_DTM: NORMAL
MDC_IDC_MSMT_BATTERY_REMAINING_LONGEVITY: 142 MO
MDC_IDC_MSMT_BATTERY_RRT_TRIGGER: 2.62
MDC_IDC_MSMT_BATTERY_STATUS: NORMAL
MDC_IDC_MSMT_BATTERY_VOLTAGE: 3.01 V
MDC_IDC_MSMT_LEADCHNL_RV_IMPEDANCE_VALUE: 342 OHM
MDC_IDC_MSMT_LEADCHNL_RV_IMPEDANCE_VALUE: 399 OHM
MDC_IDC_MSMT_LEADCHNL_RV_PACING_THRESHOLD_AMPLITUDE: 0.5 V
MDC_IDC_MSMT_LEADCHNL_RV_PACING_THRESHOLD_PULSEWIDTH: 0.4 MS
MDC_IDC_MSMT_LEADCHNL_RV_SENSING_INTR_AMPL: 7.12 MV
MDC_IDC_MSMT_LEADCHNL_RV_SENSING_INTR_AMPL: 7.12 MV
MDC_IDC_PG_IMPLANT_DTM: NORMAL
MDC_IDC_PG_MFG: NORMAL
MDC_IDC_PG_MODEL: NORMAL
MDC_IDC_PG_SERIAL: NORMAL
MDC_IDC_PG_TYPE: NORMAL
MDC_IDC_SESS_CLINIC_NAME: NORMAL
MDC_IDC_SESS_DTM: NORMAL
MDC_IDC_SESS_TYPE: NORMAL
MDC_IDC_SET_BRADY_HYSTRATE: NORMAL
MDC_IDC_SET_BRADY_LOWRATE: 60 {BEATS}/MIN
MDC_IDC_SET_BRADY_MAX_SENSOR_RATE: 120 {BEATS}/MIN
MDC_IDC_SET_BRADY_MODE: NORMAL
MDC_IDC_SET_LEADCHNL_RV_PACING_AMPLITUDE: 2 V
MDC_IDC_SET_LEADCHNL_RV_PACING_ANODE_ELECTRODE_1: NORMAL
MDC_IDC_SET_LEADCHNL_RV_PACING_ANODE_LOCATION_1: NORMAL
MDC_IDC_SET_LEADCHNL_RV_PACING_CAPTURE_MODE: NORMAL
MDC_IDC_SET_LEADCHNL_RV_PACING_CATHODE_ELECTRODE_1: NORMAL
MDC_IDC_SET_LEADCHNL_RV_PACING_CATHODE_LOCATION_1: NORMAL
MDC_IDC_SET_LEADCHNL_RV_PACING_POLARITY: NORMAL
MDC_IDC_SET_LEADCHNL_RV_PACING_PULSEWIDTH: 0.4 MS
MDC_IDC_SET_LEADCHNL_RV_SENSING_ANODE_ELECTRODE_1: NORMAL
MDC_IDC_SET_LEADCHNL_RV_SENSING_ANODE_LOCATION_1: NORMAL
MDC_IDC_SET_LEADCHNL_RV_SENSING_CATHODE_ELECTRODE_1: NORMAL
MDC_IDC_SET_LEADCHNL_RV_SENSING_CATHODE_LOCATION_1: NORMAL
MDC_IDC_SET_LEADCHNL_RV_SENSING_POLARITY: NORMAL
MDC_IDC_SET_LEADCHNL_RV_SENSING_SENSITIVITY: 0.9 MV
MDC_IDC_SET_ZONE_DETECTION_INTERVAL: 360 MS
MDC_IDC_SET_ZONE_TYPE: NORMAL
MDC_IDC_STAT_BRADY_DTM_END: NORMAL
MDC_IDC_STAT_BRADY_DTM_START: NORMAL
MDC_IDC_STAT_BRADY_RV_PERCENT_PACED: 99.99 %
MDC_IDC_STAT_EPISODE_RECENT_COUNT: 0
MDC_IDC_STAT_EPISODE_RECENT_COUNT: 0
MDC_IDC_STAT_EPISODE_RECENT_COUNT_DTM_END: NORMAL
MDC_IDC_STAT_EPISODE_RECENT_COUNT_DTM_END: NORMAL
MDC_IDC_STAT_EPISODE_RECENT_COUNT_DTM_START: NORMAL
MDC_IDC_STAT_EPISODE_RECENT_COUNT_DTM_START: NORMAL
MDC_IDC_STAT_EPISODE_TOTAL_COUNT: 0
MDC_IDC_STAT_EPISODE_TOTAL_COUNT: 0
MDC_IDC_STAT_EPISODE_TOTAL_COUNT_DTM_END: NORMAL
MDC_IDC_STAT_EPISODE_TOTAL_COUNT_DTM_END: NORMAL
MDC_IDC_STAT_EPISODE_TOTAL_COUNT_DTM_START: NORMAL
MDC_IDC_STAT_EPISODE_TOTAL_COUNT_DTM_START: NORMAL
MDC_IDC_STAT_EPISODE_TYPE: NORMAL

## 2020-07-13 ENCOUNTER — CARE COORDINATION (OUTPATIENT)
Dept: CARDIOLOGY | Facility: CLINIC | Age: 80
End: 2020-07-13

## 2020-07-13 DIAGNOSIS — R06.02 SOB (SHORTNESS OF BREATH): Primary | ICD-10-CM

## 2020-07-13 NOTE — PROGRESS NOTES
"[facility]    Incoming VM from pt re: cough. States she has a frequent cough with \"lots of spit coming out\".     Reviewed chart, pt last saw Dr. Morales 3/19/20. Per note, euvolemic, stable on torsemide 10 mg daily, follow-up in 6 months.     Called pt back to discuss. States she wakes up and that she has so much saliva in her throat that she has to cough it up to help her breathe. This has been going on for months but worse over the last 2 weeks. Saliva is stringy and clear. Sometimes it take 15-20 coughs to clear it up. Propping pillows and/or sitting up helps.    Conts to take daily wts, reports wt is stable for months. Denies peripheral swelling. Resting SOB is at baseline. Pt reports that she had stopped taking her prescribed 10 mg of torsemide daily, but restarted when these coughing fits started to get worse 2 weeks ago.     Xochitl Berman NP on leave. Will route to Dr. Morales for review and advisement. Pt is OK if we leave a detailed VM.    AYSE StackN, RN, PHN, HNB-BC   7/13/2020 at 12:18 PM    Update: reviewed with Dr. Morales. He suggests virtual visit with [facility] this week. Discussed with pt, agrees with plan. Msg'd scheduling to set up virtual appt with Brenda Orr PA-C on Friday with BMP, BNP, HGB prior.      7/13/2020 at 1:09 PM          "

## 2020-07-14 ENCOUNTER — TELEPHONE (OUTPATIENT)
Dept: CARDIOLOGY | Facility: CLINIC | Age: 80
End: 2020-07-14

## 2020-07-14 NOTE — TELEPHONE ENCOUNTER

## 2020-07-15 DIAGNOSIS — R06.02 SOB (SHORTNESS OF BREATH): ICD-10-CM

## 2020-07-15 LAB
ANION GAP SERPL CALCULATED.3IONS-SCNC: 10.5 MMOL/L (ref 6–17)
BUN SERPL-MCNC: 31 MG/DL (ref 7–30)
CALCIUM SERPL-MCNC: 9.7 MG/DL (ref 8.5–10.5)
CHLORIDE SERPL-SCNC: 102 MMOL/L (ref 98–107)
CO2 SERPL-SCNC: 28 MMOL/L (ref 23–29)
CREAT SERPL-MCNC: 1.21 MG/DL (ref 0.7–1.3)
GFR SERPL CREATININE-BSD FRML MDRD: 43 ML/MIN/{1.73_M2}
GLUCOSE SERPL-MCNC: 89 MG/DL (ref 70–105)
HGB BLD-MCNC: 13.2 G/DL (ref 11.7–15.7)
NT-PROBNP SERPL-MCNC: 2198 PG/ML (ref 0–450)
POTASSIUM SERPL-SCNC: 4.5 MMOL/L (ref 3.5–5.1)
SODIUM SERPL-SCNC: 136 MMOL/L (ref 136–145)

## 2020-07-15 PROCEDURE — 83880 ASSAY OF NATRIURETIC PEPTIDE: CPT | Performed by: INTERNAL MEDICINE

## 2020-07-15 PROCEDURE — 80048 BASIC METABOLIC PNL TOTAL CA: CPT | Performed by: INTERNAL MEDICINE

## 2020-07-15 PROCEDURE — 36415 COLL VENOUS BLD VENIPUNCTURE: CPT | Performed by: INTERNAL MEDICINE

## 2020-07-15 PROCEDURE — 85018 HEMOGLOBIN: CPT | Performed by: INTERNAL MEDICINE

## 2020-07-17 ENCOUNTER — VIRTUAL VISIT (OUTPATIENT)
Dept: CARDIOLOGY | Facility: CLINIC | Age: 80
End: 2020-07-17
Attending: NURSE PRACTITIONER
Payer: COMMERCIAL

## 2020-07-17 DIAGNOSIS — R06.02 SOB (SHORTNESS OF BREATH): ICD-10-CM

## 2020-07-17 PROCEDURE — 99442 ZZC PHYSICIAN TELEPHONE EVALUATION 11-20 MIN: CPT | Performed by: PHYSICIAN ASSISTANT

## 2020-07-17 NOTE — LETTER
7/17/2020    Alecia Millan MD, MD  [facility] [address]  [place] [address]    RE: Priya Funez       Dear Colleague,    I had the pleasure of seeing Priya Funez in the [facility].    Priya Funez is a 79 year old female who is being evaluated via a billable telephone visit.      Provider notes:  Lavonne Funez is a very pleasant 79-year-old female with a history of heart failure with preserved LVEF, atrial fibrillation status post AV node ablation and permanent pacemaker implantation in January 2019, emergent aortic dissection repair in January 2019, hypertension, and obesity.  She follows with Dr. Morales and is typically followed in the core clinic by TAMRA Fernando.    As noted, she presented in January 2019 with a dissecting aortic aneurysm and it was hospitalized for nearly that entire month.  She had a complicated hospital course.  There is prolonged ischemia to the SMA and right renal regions.  She required tracheostomy and was discharged to [place] on ventilatory support.  She transitioned home in the summer of last year and her tracheostomy has been discontinued.    She is followed in the core clinic since last summer.  Her volume status has been managed with torsemide.  Her last echocardiogram in June 2019 showed an LVEF of 60 to 65%.  The RV was mild to moderately dilated with mildly decreased RV systolic function.  There was moderate TR and mild MR.    She last had a virtual visit with Dr. Morales in March. She seems to be doing well at that time without any significant congestive heart failure symptoms.  Her weight was stable around 151 pounds.  She is struggling with some chronic back pain issues which were limiting her activity.  No medication changes were made at that point.    She recently called the clinic and made an appointment to discuss some recent concerns. She notes that historically she will have coughing episodes but recently she was  concerned as she has now been coughing up some clear phlegm. Her cough perhaps seems to be worse during the night. At times, she will wake up early in the night and feel short of breath. This improves after she is able to cough some stuff up.     Before the last week, she was only taking her Torsemide about three times week. Over the last week she has been diligent about taking her Torsemide daily and she has noted her cough seems to be improving.     She notes perhaps some mild increased dyspnea on exertion some days while walking around her home with her walker. Other days, she tells me she will have a lot of energy and feel fine. She has not had any chest discomfort, yuly orthopnea or leg edema. No fevers or other URI symptoms. Her weight has been very stable within a 3 lb range and was 152 lbs today.     Assessment/plan:  Priya Funez is a very pleasant 79 year old female with a history of HFpEF, chronic atrial fibrillation s/p AV node ablation and permanent pacemaker implantation in January 2019, emergent aortic dissection repair in January 2019, hypertension, and obesity.    She has been quite stable from a heart failure perspective over the last several months but has been concerned about recent coughing episodes, worse at night, which are productive at times. At this point, I'm unsure if this is related to her heart failure. She did have recent lab work and her NTproBNP is elevated around 2200. Her renal function is stable from prior. Hemoglobin is normal. She has noted some improvement after being more diligent about taking her Torsemide over the last week. She has no other clear infectious symptoms.    At this point, we elected to have her continue to be diligent about taking her Torsemide daily as she has already noted some improvement. I will have the CORE nurses follow up with her in week or two for an update. If she is not improving, we could consider an in person visit or a CXR. She is scheduled to  see her PCP next month. We discussed signs/symptoms that would warrant further evaluation and she'll call the clinic with any changes or concerns.     Total call time: 12 minutes    Thank you for allowing me to participate in the care of your patient.    Sincerely,     Flaquita Orr PA-C     [facility]

## 2020-07-17 NOTE — PROGRESS NOTES
"Review Of Systems  Skin: negative  Eyes: negative  Ears/Nose/Throat: negative  Respiratory: Shortness of breath- , Dyspnea on exertion-  and Cough-   Cardiovascular: negative  Gastrointestinal: negative  Genitourinary: negative  Musculoskeletal: negative  Neurologic: negative  Psychiatric: negative  Hematologic/Lymphatic/Immunologic: negative  Endocrine: negative    Vitals - Patient Reported  Systolic (Patient Reported): 116  Diastolic (Patient Reported): 74  Weight (Patient Reported): 68.9 kg (152 lb)  Height (Patient Reported): 162.6 cm (5' 4\")  BMI (Based on Pt Reported Ht/Wt): 26.09  Pulse (Patient Reported): 85      Priya Funez is a 79 year old female who is being evaluated via a billable telephone visit.      The patient has been notified of following:     \"This telephone visit will be conducted via a call between you and your physician/provider. We have found that certain health care needs can be provided without the need for a physical exam.  This service lets us provide the care you need with a short phone conversation.  If a prescription is necessary we can send it directly to your pharmacy.  If lab work is needed we can place an order for that and you can then stop by our lab to have the test done at a later time.    Telephone visits are billed at different rates depending on your insurance coverage. During this emergency period, for some insurers they may be billed the same as an in-person visit.  Please reach out to your insurance provider with any questions.    If during the course of the call the physician/provider feels a telephone visit is not appropriate, you will not be charged for this service.\"    Patient has given verbal consent for Telephone visit?  Yes    What phone number would you like to be contacted at?   [phone]    How would you like to obtain your AVS? Mail a copy  **Pt would like her recent lab work included with her AVS**    Reviewed:  SHERITA Kulkarni  07/17/20    Provider " notes:  Lavonne Funez is a very pleasant 79-year-old female with a history of heart failure with preserved LVEF, atrial fibrillation status post AV node ablation and permanent pacemaker implantation in January 2019, emergent aortic dissection repair in January 2019, hypertension, and obesity.  She follows with Dr. Morales and is typically followed in the core clinic by TAMRA Fernando.    As noted, she presented in January 2019 with a dissecting aortic aneurysm and it was hospitalized for nearly that entire month.  She had a complicated hospital course.  There is prolonged ischemia to the SMA and right renal regions.  She required tracheostomy and was discharged to [place] on ventilatory support.  She transitioned home in the summer of last year and her tracheostomy has been discontinued.    She is followed in the core clinic since last summer.  Her volume status has been managed with torsemide.  Her last echocardiogram in June 2019 showed an LVEF of 60 to 65%.  The RV was mild to moderately dilated with mildly decreased RV systolic function.  There was moderate TR and mild MR.    She last had a virtual visit with Dr. Morales in March. She seems to be doing well at that time without any significant congestive heart failure symptoms.  Her weight was stable around 151 pounds.  She is struggling with some chronic back pain issues which were limiting her activity.  No medication changes were made at that point.    She recently called the clinic and made an appointment to discuss some recent concerns. She notes that historically she will have coughing episodes but recently she was concerned as she has now been coughing up some clear phlegm. Her cough perhaps seems to be worse during the night. At times, she will wake up early in the night and feel short of breath. This improves after she is able to cough some stuff up.     Before the last week, she was only taking her Torsemide about three times week. Over the last  week she has been diligent about taking her Torsemide daily and she has noted her cough seems to be improving.     She notes perhaps some mild increased dyspnea on exertion some days while walking around her home with her walker. Other days, she tells me she will have a lot of energy and feel fine. She has not had any chest discomfort, yuly orthopnea or leg edema. No fevers or other URI symptoms. Her weight has been very stable within a 3 lb range and was 152 lbs today.     Assessment/plan:  Priya Funez is a very pleasant 79 year old female with a history of HFpEF, chronic atrial fibrillation s/p AV node ablation and permanent pacemaker implantation in January 2019, emergent aortic dissection repair in January 2019, hypertension, and obesity.    She has been quite stable from a heart failure perspective over the last several months but has been concerned about recent coughing episodes, worse at night, which are productive at times. At this point, I'm unsure if this is related to her heart failure. She did have recent lab work and her NTproBNP is elevated around 2200. Her renal function is stable from prior. Hemoglobin is normal. She has noted some improvement after being more diligent about taking her Torsemide over the last week. She has no other clear infectious symptoms.    At this point, we elected to have her continue to be diligent about taking her Torsemide daily as she has already noted some improvement. I will have the CORE nurses follow up with her in week or two for an update. If she is not improving, we could consider an in person visit or a CXR. She is scheduled to see her PCP next month. We discussed signs/symptoms that would warrant further evaluation and she'll call the clinic with any changes or concerns.     Total call time: 12 minutes

## 2020-07-17 NOTE — PATIENT INSTRUCTIONS
Call CORE nurse for any questions or concerns Mon-Fri 8am-4pm:                                                 #[phone]                                       For concerns after hours:                                               #[phone], option 2     1: Medication changes: no medication changes today. Please. Be diligent about taking your Torsemide   2: Plan from today: **  3: Lab results: see attached; **

## 2020-07-29 ENCOUNTER — TELEPHONE (OUTPATIENT)
Dept: CARDIOLOGY | Facility: CLINIC | Age: 80
End: 2020-07-29

## 2020-07-29 NOTE — TELEPHONE ENCOUNTER
----- Message from Jessica Leroy RN sent at 7/17/2020  3:24 PM CDT -----  Regarding: Call for update on cough    ----- Message -----  From: Flaquita Orr PA-C  Sent: 7/17/2020   3:18 PM CDT  To: Ivette [facility] Core Nurse    Hi! Will you please call Priya for an update on her cough in a week or two? She had frequently been skippign Torsemide doses and has noted some improvement after taking it diligently for a week so we're going to see how things go at this point before more work up. Thanks. Brenda

## 2020-07-29 NOTE — TELEPHONE ENCOUNTER
[facility]    Spoke w/ pt and reviewed Brenda Orr PAC's recs:  --monitor cough and call CORE/PCP if increases in frequency/intensity  --Take torsemide daily  --review cough with PCP at appt next month    Pt agreed to above.    She had question about a treatment her PCP suggested for bone density, Prolia. She saw a commercial and heard that a side effect is SOB. I advised her to discuss her concerns, and possible alternatives, with her PCP next month.    Raegan Maloney RN BSN   2:14 PM 07/29/20

## 2020-07-29 NOTE — TELEPHONE ENCOUNTER
If things are better I would probably watch for now. I'd encourage her to continue to take her Torsemide daily. She can review with her PCP in August if needed. Thanks. Choa

## 2020-07-29 NOTE — TELEPHONE ENCOUNTER
"[facility]  HF follow up phone assessment    Recent weights:    7/29 254#   7/22 252#    Current diuretic:   Torsemide 10mg daily <----missed 1 dose in last wk      SOB: denied SOB/ALEXANDER  BLE Edema/ Abdominal Edema: denied  Pt told me her cough seems improved but still has an episode ~1x daily, productive of \"slimy, clear\" sputum, feels like she has a lot of mucus in her throat upon waking and she's able to clear it.  Denied fever, chills, sick contacts. She said some days she urinates more frequently than others. Able to sleep in bed on typical 2 pillows.  She denied difficulty swallowing, but noted that some times she's had to eat a cracker after taking pills \"to make sure they go down.\"       Assessment and Plan:    Pt reported decreased frequency in cough, wt stable. She noted she has PCP visit mid August and wondered if she should have a CXR.  --Route to Brenda Orr PAC for review      Future Appointments   Date Time Provider Department Center   8/12/2020  9:00 AM HOLDEN ANDERSON [facility] CL[facility]   9/1/2020 10:10 AM [facility]     Raegan Maloney RN BSN   1:35 PM 07/29/20            "

## 2020-08-12 ENCOUNTER — ANCILLARY PROCEDURE (OUTPATIENT)
Dept: CARDIOLOGY | Facility: CLINIC | Age: 80
End: 2020-08-12
Attending: INTERNAL MEDICINE
Payer: COMMERCIAL

## 2020-08-12 DIAGNOSIS — Z95.0 CARDIAC PACEMAKER IN SITU: Primary | ICD-10-CM

## 2020-08-12 DIAGNOSIS — I44.2 COMPLETE HEART BLOCK (H): ICD-10-CM

## 2020-08-12 DIAGNOSIS — Z95.0 CARDIAC PACEMAKER IN SITU: ICD-10-CM

## 2020-08-12 PROCEDURE — 93279 PRGRMG DEV EVAL PM/LDLS PM: CPT | Performed by: INTERNAL MEDICINE

## 2020-08-19 LAB
MDC_IDC_LEAD_IMPLANT_DT: NORMAL
MDC_IDC_LEAD_LOCATION: NORMAL
MDC_IDC_LEAD_LOCATION_DETAIL_1: NORMAL
MDC_IDC_LEAD_MFG: NORMAL
MDC_IDC_LEAD_MODEL: NORMAL
MDC_IDC_LEAD_POLARITY_TYPE: NORMAL
MDC_IDC_LEAD_SERIAL: NORMAL
MDC_IDC_MSMT_BATTERY_DTM: NORMAL
MDC_IDC_MSMT_BATTERY_REMAINING_LONGEVITY: 141 MO
MDC_IDC_MSMT_BATTERY_RRT_TRIGGER: 2.62
MDC_IDC_MSMT_BATTERY_STATUS: NORMAL
MDC_IDC_MSMT_BATTERY_VOLTAGE: 2.99 V
MDC_IDC_MSMT_LEADCHNL_RV_IMPEDANCE_VALUE: 361 OHM
MDC_IDC_MSMT_LEADCHNL_RV_IMPEDANCE_VALUE: 437 OHM
MDC_IDC_MSMT_LEADCHNL_RV_PACING_THRESHOLD_AMPLITUDE: 0.62 V
MDC_IDC_MSMT_LEADCHNL_RV_PACING_THRESHOLD_PULSEWIDTH: 0.4 MS
MDC_IDC_MSMT_LEADCHNL_RV_SENSING_INTR_AMPL: 7.12 MV
MDC_IDC_MSMT_LEADCHNL_RV_SENSING_INTR_AMPL: 7.12 MV
MDC_IDC_PG_IMPLANT_DTM: NORMAL
MDC_IDC_PG_MFG: NORMAL
MDC_IDC_PG_MODEL: NORMAL
MDC_IDC_PG_SERIAL: NORMAL
MDC_IDC_PG_TYPE: NORMAL
MDC_IDC_SESS_CLINIC_NAME: NORMAL
MDC_IDC_SESS_DTM: NORMAL
MDC_IDC_SESS_TYPE: NORMAL
MDC_IDC_SET_BRADY_HYSTRATE: NORMAL
MDC_IDC_SET_BRADY_LOWRATE: 60 {BEATS}/MIN
MDC_IDC_SET_BRADY_MAX_SENSOR_RATE: 120 {BEATS}/MIN
MDC_IDC_SET_BRADY_MODE: NORMAL
MDC_IDC_SET_LEADCHNL_RV_PACING_AMPLITUDE: 2 V
MDC_IDC_SET_LEADCHNL_RV_PACING_ANODE_ELECTRODE_1: NORMAL
MDC_IDC_SET_LEADCHNL_RV_PACING_ANODE_LOCATION_1: NORMAL
MDC_IDC_SET_LEADCHNL_RV_PACING_CAPTURE_MODE: NORMAL
MDC_IDC_SET_LEADCHNL_RV_PACING_CATHODE_ELECTRODE_1: NORMAL
MDC_IDC_SET_LEADCHNL_RV_PACING_CATHODE_LOCATION_1: NORMAL
MDC_IDC_SET_LEADCHNL_RV_PACING_POLARITY: NORMAL
MDC_IDC_SET_LEADCHNL_RV_PACING_PULSEWIDTH: 0.4 MS
MDC_IDC_SET_LEADCHNL_RV_SENSING_ANODE_ELECTRODE_1: NORMAL
MDC_IDC_SET_LEADCHNL_RV_SENSING_ANODE_LOCATION_1: NORMAL
MDC_IDC_SET_LEADCHNL_RV_SENSING_CATHODE_ELECTRODE_1: NORMAL
MDC_IDC_SET_LEADCHNL_RV_SENSING_CATHODE_LOCATION_1: NORMAL
MDC_IDC_SET_LEADCHNL_RV_SENSING_POLARITY: NORMAL
MDC_IDC_SET_LEADCHNL_RV_SENSING_SENSITIVITY: 0.9 MV
MDC_IDC_SET_ZONE_DETECTION_INTERVAL: 360 MS
MDC_IDC_SET_ZONE_TYPE: NORMAL
MDC_IDC_STAT_BRADY_DTM_END: NORMAL
MDC_IDC_STAT_BRADY_DTM_START: NORMAL
MDC_IDC_STAT_BRADY_RV_PERCENT_PACED: 99.99 %
MDC_IDC_STAT_EPISODE_RECENT_COUNT: 0
MDC_IDC_STAT_EPISODE_RECENT_COUNT: 0
MDC_IDC_STAT_EPISODE_RECENT_COUNT_DTM_END: NORMAL
MDC_IDC_STAT_EPISODE_RECENT_COUNT_DTM_END: NORMAL
MDC_IDC_STAT_EPISODE_RECENT_COUNT_DTM_START: NORMAL
MDC_IDC_STAT_EPISODE_RECENT_COUNT_DTM_START: NORMAL
MDC_IDC_STAT_EPISODE_TOTAL_COUNT: 0
MDC_IDC_STAT_EPISODE_TOTAL_COUNT: 0
MDC_IDC_STAT_EPISODE_TOTAL_COUNT_DTM_END: NORMAL
MDC_IDC_STAT_EPISODE_TOTAL_COUNT_DTM_END: NORMAL
MDC_IDC_STAT_EPISODE_TOTAL_COUNT_DTM_START: NORMAL
MDC_IDC_STAT_EPISODE_TOTAL_COUNT_DTM_START: NORMAL
MDC_IDC_STAT_EPISODE_TYPE: NORMAL

## 2020-08-31 ENCOUNTER — TELEPHONE (OUTPATIENT)
Dept: CARDIOLOGY | Facility: CLINIC | Age: 80
End: 2020-08-31

## 2020-08-31 NOTE — TELEPHONE ENCOUNTER
Wellness Screening Tool    Symptom Screening:    Do you have one of the following NEW symptoms:      Fever (subjective or >100.0)?  No    New cough? No    Shortness of breath? No    Chills? No    New loss of taste or smell? No    Generalized body aches? No    New persistent headache? No    New sore throat? No    Nausea, vomiting or diarrhea? No    Within the past 2 weeks, have you been exposed to someone with a known positive illness below?      COVID - 19 (known or suspected) No    Chicken pox?  No    Measles? No    Pertussis? No    Have you had a positive COVID-19 diagnostic test (nasal swab test) in the last 14 days or are you currently   on self-quarantine restrictions (i.e.travel restriction, exposure, etc?) No        Patient notified of visitor restriction: Yes  Patient informed to wear a mask: Yes    Patient's appointment status: Patient will be seen in clinic as scheduled on 9/1

## 2020-09-01 ENCOUNTER — OFFICE VISIT (OUTPATIENT)
Dept: CARDIOLOGY | Facility: CLINIC | Age: 80
End: 2020-09-01
Attending: INTERNAL MEDICINE
Payer: COMMERCIAL

## 2020-09-01 VITALS
HEART RATE: 88 BPM | HEIGHT: 64 IN | BODY MASS INDEX: 26.92 KG/M2 | WEIGHT: 157.7 LBS | SYSTOLIC BLOOD PRESSURE: 131 MMHG | DIASTOLIC BLOOD PRESSURE: 86 MMHG

## 2020-09-01 DIAGNOSIS — I48.19 PERSISTENT ATRIAL FIBRILLATION (H): ICD-10-CM

## 2020-09-01 DIAGNOSIS — I50.32 CHRONIC DIASTOLIC CONGESTIVE HEART FAILURE (H): Primary | ICD-10-CM

## 2020-09-01 PROCEDURE — 99213 OFFICE O/P EST LOW 20 MIN: CPT | Performed by: PHYSICIAN ASSISTANT

## 2020-09-01 ASSESSMENT — MIFFLIN-ST. JEOR: SCORE: 1177.38

## 2020-09-01 NOTE — PATIENT INSTRUCTIONS
Call CORE nurse for any questions or concerns Mon-Fri 8am-4pm:                                                 #[phone]                                       For concerns after hours:                                               #[phone], option 2     1: Medication changes: no medication changes today. You can try mucinex over the counter to see if that helps your cough/phlegm.  2: Plan from today: see Xochitl in three months with labs to recheck your kidney.

## 2020-09-01 NOTE — PROGRESS NOTES
Cardiology Progress Note    Date of Service: 09/04/2020  Patient seen today in follow up of: CORE follow up  Primary cardiologist: Dr. Morales    HPI:  Priya Funez is a very pleasant 79 year old female with a history of heart failure with preserved LVEF, atrial fibrillation status post AV node ablation and permanent pacemaker implantation in January 2019, emergent aortic dissection repair in January 2019, hypertension, and obesity.  She follows with Dr. Morales.    As noted, she presented in January 2019 with a dissecting aortic aneurysm and it was hospitalized for nearly that entire month.  She had a complicated hospital course.  There is prolonged ischemia to the SMA and right renal regions.  She required tracheostomy and was discharged to [place] on ventilatory support.  She transitioned home in the summer of last year and her tracheostomy has been discontinued.     She has followed in the core clinic since last summer.  Her volume status has been managed with torsemide.  Her last echocardiogram in June 2019 showed an LVEF of 60 to 65%.  The RV was mild to moderately dilated with mildly decreased RV systolic function.  There was moderate TR and mild MR.    I had a virtual visit with Priya on 7/17/20. She was concerned about some coughing episodes which were productive of clear phlegm. She perhaps had some mild exertion dyspnea. She had only been taking her Torsemide a few times a week. I encouraged her to take her Torsemide daily as prescribed and to see if her symptoms improved. With taking the regular Torsemide, she did have some improvement.    She is back today for routine follow up.  Overall, she notes she is feeling quite well.  She continues to have episodes of coughing up phlegm with associated shortness of breath.  She notes at times she will be sitting on the couch resting when she will start to feel short of breath.  She subsequently is able to cough up some phlegm and her shortness of breath  resolves it.  Otherwise, during the day she has no complaints of dyspnea on exertion.  She does not have orthopnea or PND.  Her weights at home have been very stable.  She has been taking the 10 mg of torsemide most days although does admits that on occasion if she has an event going on.    ASSESSMENT/PLAN:  Lavonne Funez is a delightful 79-year-old female with a history of heart failure with preserved LVEF, atrial fibrillation status post AV node ablation and permanent pacemaker implantation in January 2019, emergent aortic dissection repair in January 2019.    At this point, Lavonne appears to be quite stable from a cardiovascular perspective.  Her heart failure appears to be stable at this point.  She currently is taking 10 mg of torsemide daily.  She had labs through her primary care physician's office on 8/19 which showed stable renal function with a creatinine of 1.25.  Her electrolytes are normal.  For now, I think it would be reasonable to continue her on this dose.    She is complaining of a productive cough which has been mildly improved after increasing her torsemide although still persists.  I am not entirely sure of the cause of this.  We discussed potentially trying Mucinex to see if that helps at all.  If not, suggested she follow-up with her PCP.    Her blood pressure is well controlled in clinic today.    Her last device check about a month ago showed appropriate device function.  She remains on warfarin given her history of atrial fibrillation and has not had any issues with bleeding.    I will have her follow-up with us in the core clinic in about 3 months.  She will contact us sooner with any questions or concerns.    Orders this Visit:  Orders Placed This Encounter   Procedures     Basic metabolic panel     Follow-Up with CORE Clinic - [facility] visit     No orders of the defined types were placed in this encounter.    There are no discontinued medications.    CURRENT MEDICATIONS:  Current Outpatient  Medications   Medication Sig Dispense Refill     Acetaminophen 325 MG CAPS Take 325-650 mg by mouth every 4 hours as needed       aspirin (ASA) 81 MG EC tablet Take 81 mg by mouth every 24 hours       cholecalciferol (VITAMIN D-1000 MAX ST) 1000 units TABS Take 1,000 Units by mouth daily        cyanocobalamin (VITAMIN B-12) 100 MCG tablet Take 1,000 mcg by mouth daily        escitalopram (LEXAPRO) 10 MG tablet Take 10 mg by mouth daily       metoprolol tartrate (LOPRESSOR) 25 MG tablet Take 1 tablet (25 mg) by mouth 2 times daily 180 tablet 3     torsemide (DEMADEX) 20 MG tablet Take 1/2 tablet daily = 10mg daily 45 tablet 3     warfarin ANTICOAGULANT (COUMADIN) 2 MG tablet Take by mouth daily 2 mg Mon, Wed, Fri and 4 mg Sun, Tues, Thurs, Sat.       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 mm Hemashield branch graft 1/4/19     Heart failure (H)      Hypertension      Persistent atrial fibrillation (H)     post-op 1/2019: AV node ablation and single chamber pacemaker implanted 1/11/2019     Pleural effusion, bilateral     s/p left thoracentesis of 150cc 1/11/19;  right thoracentesis of 400cc 1/24/2019     PAST SURGICAL HISTORY:  Past Surgical History:   Procedure Laterality Date     ANGIOGRAM Right 1/4/2019    Procedure: DIAGONSTIC ANGIOGRAM OF SMA;  Surgeon: Rigo Jennings MD;  Location:  OR     BYPASS GRAFT ARTERY CORONARY, REPAIR VALVE AORTIC, COMBINED N/A 1/4/2019    Procedure: AORTIC DISSECTION REPAIR WITH GRAFT- HEMASHIELD PLATINUM WOVEN DOUBLE VELOR 4 SIDE ARM VASCULAR GRAFT, D:35 X 12 X 10 X 10MM/ L:50CM;  Surgeon: Jose Winslow MD;  Location:  OR     EP ABLATION AV NODE N/A 1/11/2019    Procedure: EP Ablation AV Node;  Surgeon: Tsering Davey MD;  Location:  HEART  CARDIAC CATH LAB     EP PACEMAKER Left 1/11/2019    Procedure: EP Pacemaker;  Surgeon: Tsering Davey MD;  Location:  HEART CARDIAC CATH LAB     TRACHEOSTOMY N/A 1/25/2019    Procedure: TRACHEOSTOMY  (DR MCCLELLAND);  Surgeon: Bryson Mcclelland MD;  Location:  OR     FAMILY HISTORY:  No family history on file.  SOCIAL HISTORY:  Social History     Socioeconomic History     Marital status:      Spouse name: None     Number of children: None     Years of education: None     Highest education level: None   Occupational History     None   Social Needs     Financial resource strain: None     Food insecurity     Worry: None     Inability: None     Transportation needs     Medical: None     Non-medical: None   Tobacco Use     Smoking status: Never Smoker     Smokeless tobacco: Never Used     Tobacco comment: hisband   Substance and Sexual Activity     Alcohol use: No     Frequency: Never     Comment: no alcohol 7/9/19     Drug use: No     Sexual activity: None   Lifestyle     Physical activity     Days per week: None     Minutes per session: None     Stress: None   Relationships     Social connections     Talks on phone: None     Gets together: None     Attends Religion service: None     Active member of club or organization: None     Attends meetings of clubs or organizations: None     Relationship status: None     Intimate partner violence     Fear of current or ex partner: None     Emotionally abused: None     Physically abused: None     Forced sexual activity: None   Other Topics Concern     Parent/sibling w/ CABG, MI or angioplasty before 65F 55M? Not Asked   Social History Narrative     None     Review of Systems:  Skin:  Negative     Eyes:  Positive for glasses  ENT:  Negative    Respiratory:  Negative    Cardiovascular:  Negative    Gastroenterology: Negative    Genitourinary:  Negative    Musculoskeletal:  Negative    Neurologic:  Negative    Psychiatric:  Negative    Heme/Lymph/Imm:  Negative   "  Endocrine:  Negative       Physical Exam:  Vitals: /86   Pulse 88   Ht 1.613 m (5' 3.5\")   Wt 71.5 kg (157 lb 11.2 oz)   BMI 27.50 kg/m     Wt Readings from Last 4 Encounters:   09/01/20 71.5 kg (157 lb 11.2 oz)   03/19/20 68.5 kg (151 lb)   12/19/19 68.3 kg (150 lb 8 oz)   10/15/19 71.2 kg (157 lb)     GEN: well nourished, in no acute distress.  HEENT:  Pupils equal, round. Sclerae nonicteric.   NECK: JVP appears normal  C/V:  Regular rate and rhythm, no murmur, rub or gallop.   RESP: Respirations are unlabored. Clear to auscultation bilaterally without wheezing, rales, or rhonchi.  GI: Abdomen soft, nontender.  EXTREM: No LE edema.  NEURO: Alert and oriented, cooperative.  SKIN: Warm and dry.     Recent Lab Results:  LIPID RESULTS:  Lab Results   Component Value Date    TRIG 96 01/28/2019     LIVER ENZYME RESULTS:  Lab Results   Component Value Date    AST 27 04/04/2019    ALT 19 04/04/2019     CBC RESULTS:  Lab Results   Component Value Date    WBC 6.7 04/04/2019    RBC 3.70 (L) 04/04/2019    HGB 13.2 07/15/2020    HCT 31.7 (L) 04/04/2019    MCV 86 04/04/2019    MCH 24.6 (L) 04/04/2019    MCHC 28.7 (L) 04/04/2019    RDW 17.5 (H) 04/04/2019     04/04/2019     BMP RESULTS:  Lab Results   Component Value Date     07/15/2020    POTASSIUM 4.5 07/15/2020    CHLORIDE 102 07/15/2020    CO2 28 07/15/2020    ANIONGAP 10.5 07/15/2020    GLC 89 07/15/2020    BUN 31 (H) 07/15/2020    CR 1.21 07/15/2020    GFRESTIMATED 43 (L) 07/15/2020    GFRESTBLACK 52 (L) 07/15/2020    BESS 9.7 07/15/2020      A1C RESULTS:  Lab Results   Component Value Date    A1C 5.3 01/04/2019     INR RESULTS:  Lab Results   Component Value Date    INR 2.66 (H) 04/04/2019    INR 2.32 (H) 01/29/2019       New/Pertinent imaging results since last visit:  None    Flaquita Orr PA-C  [facility]  "

## 2020-09-01 NOTE — LETTER
9/1/2020    Alecia Millan MD, MD  [facility] [address]  [place] [address]    RE: Priya Funez       Dear Colleague,    I had the pleasure of seeing Priya Funez in the [facility].      Cardiology Progress Note    Date of Service: 09/04/2020  Patient seen today in follow up of: CORE follow up  Primary cardiologist: Dr. Morales    HPI:  Priya Funez is a very pleasant 79 year old female with a history of heart failure with preserved LVEF, atrial fibrillation status post AV node ablation and permanent pacemaker implantation in January 2019, emergent aortic dissection repair in January 2019, hypertension, and obesity.  She follows with Dr. Morales.    As noted, she presented in January 2019 with a dissecting aortic aneurysm and it was hospitalized for nearly that entire month.  She had a complicated hospital course.  There is prolonged ischemia to the SMA and right renal regions.  She required tracheostomy and was discharged to [place] on ventilatory support.  She transitioned home in the summer of last year and her tracheostomy has been discontinued.     She has followed in the core clinic since last summer.  Her volume status has been managed with torsemide.  Her last echocardiogram in June 2019 showed an LVEF of 60 to 65%.  The RV was mild to moderately dilated with mildly decreased RV systolic function.  There was moderate TR and mild MR.    I had a virtual visit with Priya on 7/17/20. She was concerned about some coughing episodes which were productive of clear phlegm. She perhaps had some mild exertion dyspnea. She had only been taking her Torsemide a few times a week. I encouraged her to take her Torsemide daily as prescribed and to see if her symptoms improved. With taking the regular Torsemide, she did have some improvement.    She is back today for routine follow up.  Overall, she notes she is feeling quite well.  She continues to have episodes of coughing  up phlegm with associated shortness of breath.  She notes at times she will be sitting on the couch resting when she will start to feel short of breath.  She subsequently is able to cough up some phlegm and her shortness of breath resolves it.  Otherwise, during the day she has no complaints of dyspnea on exertion.  She does not have orthopnea or PND.  Her weights at home have been very stable.  She has been taking the 10 mg of torsemide most days although does admits that on occasion if she has an event going on.    ASSESSMENT/PLAN:  Lavonne Funez is a delightful 79-year-old female with a history of heart failure with preserved LVEF, atrial fibrillation status post AV node ablation and permanent pacemaker implantation in January 2019, emergent aortic dissection repair in January 2019.    At this point, Lavonne appears to be quite stable from a cardiovascular perspective.  Her heart failure appears to be stable at this point.  She currently is taking 10 mg of torsemide daily.  She had labs through her primary care physician's office on 8/19 which showed stable renal function with a creatinine of 1.25.  Her electrolytes are normal.  For now, I think it would be reasonable to continue her on this dose.    She is complaining of a productive cough which has been mildly improved after increasing her torsemide although still persists.  I am not entirely sure of the cause of this.  We discussed potentially trying Mucinex to see if that helps at all.  If not, suggested she follow-up with her PCP.    Her blood pressure is well controlled in clinic today.    Her last device check about a month ago showed appropriate device function.  She remains on warfarin given her history of atrial fibrillation and has not had any issues with bleeding.    I will have her follow-up with us in the core clinic in about 3 months.  She will contact us sooner with any questions or concerns.    Orders this Visit:  Orders Placed This Encounter    Procedures     Basic metabolic panel     Follow-Up with CORE Clinic - RICH visit     No orders of the defined types were placed in this encounter.    There are no discontinued medications.    CURRENT MEDICATIONS:  Current Outpatient Medications   Medication Sig Dispense Refill     Acetaminophen 325 MG CAPS Take 325-650 mg by mouth every 4 hours as needed       aspirin (ASA) 81 MG EC tablet Take 81 mg by mouth every 24 hours       cholecalciferol (VITAMIN D-1000 MAX ST) 1000 units TABS Take 1,000 Units by mouth daily        cyanocobalamin (VITAMIN B-12) 100 MCG tablet Take 1,000 mcg by mouth daily        escitalopram (LEXAPRO) 10 MG tablet Take 10 mg by mouth daily       metoprolol tartrate (LOPRESSOR) 25 MG tablet Take 1 tablet (25 mg) by mouth 2 times daily 180 tablet 3     torsemide (DEMADEX) 20 MG tablet Take 1/2 tablet daily = 10mg daily 45 tablet 3     warfarin ANTICOAGULANT (COUMADIN) 2 MG tablet Take by mouth daily 2 mg Mon, Wed, Fri and 4 mg Sun, Tues, Thurs, Sat.       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 mm Hemashield branch graft 1/4/19     Heart failure (H)      Hypertension      Persistent atrial fibrillation (H)     post-op 1/2019: AV node ablation and single chamber pacemaker implanted 1/11/2019     Pleural effusion, bilateral     s/p left thoracentesis of 150cc 1/11/19;  right thoracentesis of 400cc 1/24/2019     PAST SURGICAL HISTORY:  Past Surgical History:   Procedure Laterality Date     ANGIOGRAM Right 1/4/2019    Procedure: DIAGONSTIC ANGIOGRAM OF SMA;  Surgeon: Rigo Jennings MD;  Location: [facility] OR     BYPASS GRAFT ARTERY CORONARY, REPAIR VALVE AORTIC, COMBINED N/A 1/4/2019    Procedure: AORTIC DISSECTION REPAIR WITH GRAFT- HEMASHIELD PLATINUM WOVEN DOUBLE VELOR  4 SIDE ARM VASCULAR GRAFT, D:35 X 12 X 10 X 10MM/ L:50CM;  Surgeon: Jose Winslow MD;  Location:  OR     EP ABLATION AV NODE N/A 1/11/2019    Procedure: EP Ablation AV Node;  Surgeon: Tsering Davey MD;  Location:  HEART CARDIAC CATH LAB     EP PACEMAKER Left 1/11/2019    Procedure: EP Pacemaker;  Surgeon: Tsering Davey MD;  Location:  HEART CARDIAC CATH LAB     TRACHEOSTOMY N/A 1/25/2019    Procedure: TRACHEOSTOMY  (DR MCCLELLAND);  Surgeon: Bryson Mcclelland MD;  Location:  OR     FAMILY HISTORY:  No family history on file.  SOCIAL HISTORY:  Social History     Socioeconomic History     Marital status:      Spouse name: None     Number of children: None     Years of education: None     Highest education level: None   Occupational History     None   Social Needs     Financial resource strain: None     Food insecurity     Worry: None     Inability: None     Transportation needs     Medical: None     Non-medical: None   Tobacco Use     Smoking status: Never Smoker     Smokeless tobacco: Never Used     Tobacco comment: hisband   Substance and Sexual Activity     Alcohol use: No     Frequency: Never     Comment: no alcohol 7/9/19     Drug use: No     Sexual activity: None   Lifestyle     Physical activity     Days per week: None     Minutes per session: None     Stress: None   Relationships     Social connections     Talks on phone: None     Gets together: None     Attends Pentecostalism service: None     Active member of club or organization: None     Attends meetings of clubs or organizations: None     Relationship status: None     Intimate partner violence     Fear of current or ex partner: None     Emotionally abused: None     Physically abused: None     Forced sexual activity: None   Other Topics Concern     Parent/sibling w/ CABG, MI or angioplasty before 65F 55M? Not Asked   Social History Narrative     None     Review of Systems:  Skin:  Negative     Eyes:  Positive for glasses  ENT:  Negative  "   Respiratory:  Negative    Cardiovascular:  Negative    Gastroenterology: Negative    Genitourinary:  Negative    Musculoskeletal:  Negative    Neurologic:  Negative    Psychiatric:  Negative    Heme/Lymph/Imm:  Negative    Endocrine:  Negative       Physical Exam:  Vitals: /86   Pulse 88   Ht 1.613 m (5' 3.5\")   Wt 71.5 kg (157 lb 11.2 oz)   BMI 27.50 kg/m     Wt Readings from Last 4 Encounters:   09/01/20 71.5 kg (157 lb 11.2 oz)   03/19/20 68.5 kg (151 lb)   12/19/19 68.3 kg (150 lb 8 oz)   10/15/19 71.2 kg (157 lb)     GEN: well nourished, in no acute distress.  HEENT:  Pupils equal, round. Sclerae nonicteric.   NECK: JVP appears normal  C/V:  Regular rate and rhythm, no murmur, rub or gallop.   RESP: Respirations are unlabored. Clear to auscultation bilaterally without wheezing, rales, or rhonchi.  GI: Abdomen soft, nontender.  EXTREM: No LE edema.  NEURO: Alert and oriented, cooperative.  SKIN: Warm and dry.     Recent Lab Results:  LIPID RESULTS:  Lab Results   Component Value Date    TRIG 96 01/28/2019     LIVER ENZYME RESULTS:  Lab Results   Component Value Date    AST 27 04/04/2019    ALT 19 04/04/2019     CBC RESULTS:  Lab Results   Component Value Date    WBC 6.7 04/04/2019    RBC 3.70 (L) 04/04/2019    HGB 13.2 07/15/2020    HCT 31.7 (L) 04/04/2019    MCV 86 04/04/2019    MCH 24.6 (L) 04/04/2019    MCHC 28.7 (L) 04/04/2019    RDW 17.5 (H) 04/04/2019     04/04/2019     BMP RESULTS:  Lab Results   Component Value Date     07/15/2020    POTASSIUM 4.5 07/15/2020    CHLORIDE 102 07/15/2020    CO2 28 07/15/2020    ANIONGAP 10.5 07/15/2020    GLC 89 07/15/2020    BUN 31 (H) 07/15/2020    CR 1.21 07/15/2020    GFRESTIMATED 43 (L) 07/15/2020    GFRESTBLACK 52 (L) 07/15/2020    BESS 9.7 07/15/2020      A1C RESULTS:  Lab Results   Component Value Date    A1C 5.3 01/04/2019     INR RESULTS:  Lab Results   Component Value Date    INR 2.66 (H) 04/04/2019    INR 2.32 (H) 01/29/2019 "       New/Pertinent imaging results since last visit:  None    Flaquita Orr PA-C  [facility]    Thank you for allowing me to participate in the care of your patient.      Sincerely,     Flaquita Orr PA-C     [facility]

## 2020-10-30 DIAGNOSIS — I50.32 CHRONIC DIASTOLIC CONGESTIVE HEART FAILURE (H): ICD-10-CM

## 2020-10-30 LAB
ANION GAP SERPL CALCULATED.3IONS-SCNC: 11.1 MMOL/L (ref 6–17)
BUN SERPL-MCNC: 28 MG/DL (ref 7–30)
CALCIUM SERPL-MCNC: 9.6 MG/DL (ref 8.5–10.5)
CHLORIDE SERPL-SCNC: 103 MMOL/L (ref 98–107)
CO2 SERPL-SCNC: 29 MMOL/L (ref 23–29)
CREAT SERPL-MCNC: 1.26 MG/DL (ref 0.7–1.3)
GFR SERPL CREATININE-BSD FRML MDRD: 41 ML/MIN/{1.73_M2}
GLUCOSE SERPL-MCNC: 94 MG/DL (ref 70–105)
POTASSIUM SERPL-SCNC: 4.1 MMOL/L (ref 3.5–5.1)
SODIUM SERPL-SCNC: 139 MMOL/L (ref 136–145)

## 2020-10-30 PROCEDURE — 80048 BASIC METABOLIC PNL TOTAL CA: CPT | Performed by: PHYSICIAN ASSISTANT

## 2020-10-30 PROCEDURE — 36415 COLL VENOUS BLD VENIPUNCTURE: CPT | Performed by: PHYSICIAN ASSISTANT

## 2020-11-06 ENCOUNTER — OFFICE VISIT (OUTPATIENT)
Dept: CARDIOLOGY | Facility: CLINIC | Age: 80
End: 2020-11-06
Payer: COMMERCIAL

## 2020-11-06 VITALS
WEIGHT: 158.7 LBS | SYSTOLIC BLOOD PRESSURE: 134 MMHG | BODY MASS INDEX: 27.67 KG/M2 | DIASTOLIC BLOOD PRESSURE: 80 MMHG | HEART RATE: 86 BPM | OXYGEN SATURATION: 96 %

## 2020-11-06 DIAGNOSIS — I50.32 CHRONIC DIASTOLIC CONGESTIVE HEART FAILURE (H): Primary | ICD-10-CM

## 2020-11-06 DIAGNOSIS — I48.19 PERSISTENT ATRIAL FIBRILLATION (H): ICD-10-CM

## 2020-11-06 DIAGNOSIS — I10 BENIGN ESSENTIAL HYPERTENSION: ICD-10-CM

## 2020-11-06 DIAGNOSIS — I71.010 ASCENDING AORTIC DISSECTION (H): ICD-10-CM

## 2020-11-06 PROCEDURE — 99214 OFFICE O/P EST MOD 30 MIN: CPT | Performed by: NURSE PRACTITIONER

## 2020-11-06 RX ORDER — TORSEMIDE 20 MG/1
20 TABLET ORAL DAILY
Qty: 45 TABLET | Refills: 3 | Status: SHIPPED | OUTPATIENT
Start: 2020-11-06 | End: 2020-12-21

## 2020-11-06 NOTE — PROGRESS NOTES
Cardiology Clinic Progress Note  Priya Funez MRN# [id number]   YOB: 1940 Age: 80 year old   Primary Cardiologist: Dr. Morales Reason for visit: CORE follow up.              Assessment and Plan:   Priya Funez is a very pleasant 80 year old female with a history of HFpEF, atrial fibrillation s/p AV leo ablation with PPM implantation 1/11/2019, emergent aortic dissection repair 1/4/19, hypertension and obesity.     She was seen today for CORE follow up with complaints of worsening dyspnea/productive cough. She has also noted a gradual weight gain, states about 5# since this summer.  Dyspnea/cough had initially improved with her taking diuretic consistently but then worsened again. Plan to increase her torsemide to 20mg daily, CORE RN phone call next week to follow up on weight/symptoms.     1.  Chronic diastolic heart failure/HFpEF :  LVEF 60-65%, LV normal size/function, RV mild to moderately dilated, RV systolic function mildly decreased. She has complaints of worsening dyspnea/productive cough. Mildly volume up on exam. Labs from today show stable kidney function and electrolytes, creatinine 1.26. Plan to increase torsemide to 20mg daily.               - NYHA class III, stage C              - Fluid status : mildly volume up              - Diuretic regimen : INCREASE torsemide to 20mg daily              - Aldosterone antagonist : will consider in future.               - Blood pressure : controlled              - Reinforced HF education provided today, reviewed low sodium diet and when to notify CORE clinic.  2. Chronic atrial fibrillation s/p AV node ablation 1/2019 - stable, asymptomatic. Most recent device interrogation was stable.                - Continue routine follow up with EP and device clinic              - [id number] score 3 (age, female, HF)              - Continue warfarin for thromboembolic prophylaxis  3. Emergent aortic dissection repair 1/4/19              - Repeat CT 6  months after surgery for surveillance completed 8/7/2019 which was stable, showing similar appearance as post repair.   4. Hypertension - controlled, continue current medications  5. Obesity - counseled patient on weight loss strategies.  6. Mild mitral regurgitation  7. Moderate tricuspid regurgitation        Changes today: INCREASE torsemide to 20mg daily until CORE RN Phone visit next week.     Follow up plan:    CORE RN to call on Tuesday. If improvement in symptoms will continue increased dosage and then she will need a BMP check.     Possible BMP with PCP visit on 11/19/20    CORE Follow up with me in 6 week with BMP prior.         History of Presenting Illness:    Priya Funez is a very pleasant 80 year old female with a history of HFpEF, atrial fibrillation s/p AV leo ablation with PPM implantation 1/11/2019, emergent aortic dissection repair 1/4/19, hypertension and obesity.     Patient presented in January with dissecting aortic aneurysm, she was hospitalized from 1/4/19-1/29/19. Patient had a complicated hospital course. There was prolonged ischemia to the SMA and right renal regions. She required tracheostomy placement and was discharged to [place] on ventilatory support. Patients tracheostomy has been discontinued and she transitioned home the beginning of June.      Post hospitalization she was evaluated by Dr. Morales in July 2019 at which point patient was noted to be volume up on exam. Recommended increasing her torsemide to 20mg BID. Lymphedema clinic referral placed and CORE consult ordered. I first met patient for CORE enrollment 7/9/19. She has been following closely in CORE clinic since.      Echocardiogram 6/26/19 showed LVEF 60-65%, LV normal size/function, RV mild to moderately dilated, RV systolic function mildly decreased, mild MR, moderate tricuspid regurgitation.  She underwent surveillance CT on 8/7/2019 for aortic dissection repair which was stable, showing similar appearance as  "post repair.     Over the summer she was having some complaints of dyspnea/cough, this had improved with consistently taking her torsemide. During her last CORE visit 9/1/20 she was doing well from a cardiac perspective. No medication changes were made.     Patient is here today for CORE follow up.      Patient reports feeling okay. Feels her breathing is worse. Continued productive cough, especially at night. Symptoms had improved when she started consistently taking torsemide, but then the past couple months has worsened. Occasionally monitoring weight at home, has notice it increased. Feels weight has increased by 5# over the summer. Complaints of shortness of breath, which she can notice \"even sitting on the couch\". Exertional dyspnea with stairs, has to stop in the middle of the stairs. Sleeping good. Head of bed elevated. Denies orthopnea or PND. Denies lower extremity edema. Patient denies chest pain or chest tightness. Denies dizziness, lightheadedness or other presyncopal symptoms. Denies tachycardia or palpitations. Taking medications daily. Notes her energy levels are good, \"get tired at 10pm at night\".     Labs from 10/30/20 show stable kidney function and electrolytes, creatinine 1.26. Blood pressure 134/80 and HR 86 in clinic today.    Appetite good, \"eating too much\", trying to limit salt, doesn't add salt. No set exercise routine. Getting activity with ADLs. Denies tobacco use. Occasional small glass of wine.         Recent Hospitalizations   1/4/19-1/29/19 r/t dissecting aortic aneurysm, underwent emergent repair 1/4/19. Patient had a complicated hospital course. There was prolonged ischemia to the SMA and right renal regions. She required tracheostomy placement and was discharged to [place] on ventilatory support.         Social History    , 4 children, 8 grandchildren, enjoys her gardens in the summer.   Social History     Socioeconomic History     Marital status:      Spouse name: " Not on file     Number of children: Not on file     Years of education: Not on file     Highest education level: Not on file   Occupational History     Not on file   Social Needs     Financial resource strain: Not on file     Food insecurity     Worry: Not on file     Inability: Not on file     Transportation needs     Medical: Not on file     Non-medical: Not on file   Tobacco Use     Smoking status: Never Smoker     Smokeless tobacco: Never Used     Tobacco comment: hisband   Substance and Sexual Activity     Alcohol use: No     Frequency: Never     Comment: no alcohol 7/9/19     Drug use: No     Sexual activity: Not on file   Lifestyle     Physical activity     Days per week: Not on file     Minutes per session: Not on file     Stress: Not on file   Relationships     Social connections     Talks on phone: Not on file     Gets together: Not on file     Attends [employer] service: Not on file     Active member of club or organization: Not on file     Attends meetings of clubs or organizations: Not on file     Relationship status: Not on file     Intimate partner violence     Fear of current or ex partner: Not on file     Emotionally abused: Not on file     Physically abused: Not on file     Forced sexual activity: Not on file   Other Topics Concern     Parent/sibling w/ CABG, MI or angioplasty before 65F 55M? Not Asked   Social History Narrative     Not on file            Review of Systems:   Skin:  Negative     Eyes:  Positive for glasses  ENT:  Negative    Respiratory:  Positive for cough;shortness of breath;dyspnea at rest;dyspnea on exertion;mucoid expectorant  Cardiovascular:    Positive for;fatigue  Gastroenterology: Negative    Genitourinary:  Positive for urinary frequency  Musculoskeletal:  Positive for arthritis;joint pain  Neurologic:  Negative    Psychiatric:  Positive for anxiety;depression  Heme/Lymph/Imm:  Positive for allergies  Endocrine:  Negative           Physical Exam:   Vitals: /80 (BP  Location: Right arm, Patient Position: Chair, Cuff Size: Adult Regular)   Pulse 86   Wt 72 kg (158 lb 11.2 oz)   SpO2 96%   BMI 27.67 kg/m     Wt Readings from Last 4 Encounters:   11/06/20 72 kg (158 lb 11.2 oz)   09/01/20 71.5 kg (157 lb 11.2 oz)   03/19/20 68.5 kg (151 lb)   12/19/19 68.3 kg (150 lb 8 oz)     GEN: well nourished, in no acute distress.  HEENT:  Pupils equal, round. Sclerae nonicteric.   NECK: Supple, no masses appreciated.   C/V:  Regular rate and rhythm, no murmur, rub or gallop.    RESP: Respirations are unlabored. Clear to auscultation bilaterally without wheezing, rales, or rhonchi.  GI: Abdomen soft, nontender.  EXTREM: Trace bilateral LE edema.  NEURO: Alert and oriented, cooperative.  SKIN: Warm and dry.        Data:   ECHO 6/26/19  The left ventricle is normal in size.  Left ventricular systolic function is normal.  The visual ejection fraction is estimated at 60-65%.  Normal left ventricular wall motion  The right ventricle is mild to moderately dilated.  Mildly decreased right ventricular systolic function  There is mild (1+) mitral regurgitation.  There is moderate (2+) tricuspid regurgitation.  IVC diameter and respiratory changes fall into an intermediate range  suggesting an RA pressure of 8 mmHg.  Right ventricular systolic pressure is elevated, consistent with mild  pulmonary hypertension.     Since the previous study, the RV now appears larger with mildly decreased  function. TR has increased.    LIPID RESULTS:  Lab Results   Component Value Date    TRIG 96 01/28/2019     LIVER ENZYME RESULTS:  Lab Results   Component Value Date    AST 27 04/04/2019    ALT 19 04/04/2019     CBC RESULTS:  Lab Results   Component Value Date    WBC 6.7 04/04/2019    RBC 3.70 (L) 04/04/2019    HGB 13.2 07/15/2020    HCT 31.7 (L) 04/04/2019    MCV 86 04/04/2019    MCH 24.6 (L) 04/04/2019    MCHC 28.7 (L) 04/04/2019    RDW 17.5 (H) 04/04/2019     04/04/2019     BMP RESULTS:  Lab Results    Component Value Date     10/30/2020    POTASSIUM 4.1 10/30/2020    CHLORIDE 103 10/30/2020    CO2 29 10/30/2020    ANIONGAP 11.1 10/30/2020    GLC 94 10/30/2020    BUN 28 10/30/2020    CR 1.26 10/30/2020    GFRESTIMATED 41 (L) 10/30/2020    GFRESTBLACK 50 (L) 10/30/2020    BESS 9.6 10/30/2020      A1C RESULTS:  Lab Results   Component Value Date    A1C 5.3 01/04/2019     INR RESULTS:  Lab Results   Component Value Date    INR 2.66 (H) 04/04/2019    INR 2.32 (H) 01/29/2019            Medications     Current Outpatient Medications   Medication Sig Dispense Refill     Acetaminophen 325 MG CAPS Take 325-650 mg by mouth every 4 hours as needed       aspirin (ASA) 81 MG EC tablet Take 81 mg by mouth every 24 hours       cholecalciferol (VITAMIN D-1000 MAX ST) 1000 units TABS Take 1,000 Units by mouth daily        cyanocobalamin (VITAMIN B-12) 100 MCG tablet Take 1,000 mcg by mouth daily        escitalopram (LEXAPRO) 10 MG tablet Take 10 mg by mouth daily       metoprolol tartrate (LOPRESSOR) 25 MG tablet Take 1 tablet (25 mg) by mouth 2 times daily 180 tablet 3     torsemide (DEMADEX) 20 MG tablet Take 1/2 tablet daily = 10mg daily 45 tablet 3     warfarin ANTICOAGULANT (COUMADIN) 2 MG tablet Take by mouth daily 2 mg Mon, Wed, Fri and 4 mg Sun, Tues, Thurs, Sat.            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, s/p Replacement of the ascending aorta with a 34 mm Hemashield branch graft 1/4/19     Heart failure (H)      Hypertension      Persistent atrial fibrillation (H)     post-op 1/2019: AV node ablation and single chamber pacemaker implanted 1/11/2019     Pleural effusion, bilateral     s/p left thoracentesis of 150cc 1/11/19;  right thoracentesis of 400cc 1/24/2019     Past Surgical History:   Procedure Laterality Date     ANGIOGRAM Right 1/4/2019    Procedure: DIAGONSTIC ANGIOGRAM OF SMA;  Surgeon: Rigo Jennings MD;   Location:  OR     BYPASS GRAFT ARTERY CORONARY, REPAIR VALVE AORTIC, COMBINED N/A 1/4/2019    Procedure: AORTIC DISSECTION REPAIR WITH GRAFT- HEMASHIELD PLATINUM WOVEN DOUBLE VELOR 4 SIDE ARM VASCULAR GRAFT, D:35 X 12 X 10 X 10MM/ L:50CM;  Surgeon: Jose Winslow MD;  Location:  OR     EP ABLATION AV NODE N/A 1/11/2019    Procedure: EP Ablation AV Node;  Surgeon: Tsering Davey MD;  Location:  HEART CARDI[facility] CATH LAB     EP PACEMAKER Left 1/11/2019    Procedure: EP Pacemaker;  Surgeon: Tsering Davey MD;  Location:  HEART [facility]ARDI[facility] CATH LAB     TRACHEOSTOMY N/A 1/25/2019    Procedure: TRACHEOSTOMY  (DR MCCLELLAND);  Surgeon: Bryson Mcclelland MD;  Location:  OR     No family history on file.         Allergies   Amoxicillin and Ciprofloxacin        TAMRA Ryan [facility]  Pager: [phone]

## 2020-11-06 NOTE — LETTER
11/6/2020    Alecia Millan MD, MD  [facility] [employer] [address]  [place] [address]    RE: Priya Funez       Dear Colleague,    I had the pleasure of seeing Priya Funez in the [facility].    Cardiology Clinic Progress Note  Priya Funez MRN# [id number]   YOB: 1940 Age: 80 year old   Primary Cardiologist: Dr. Morales Reason for visit: CORE follow up.              Assessment and Plan:   Priya Funez is a very pleasant 80 year old female with a history of HFpEF, atrial fibrillation s/p AV leo ablation with PPM implantation 1/11/2019, emergent aortic dissection repair 1/4/19, hypertension and obesity.     She was seen today for CORE follow up with complaints of worsening dyspnea/productive cough. She has also noted a gradual weight gain, states about 5# since this summer.  Dyspnea/cough had initially improved with her taking diuretic consistently but then worsened again. Plan to increase her torsemide to 20mg daily, CORE RN phone call next week to follow up on weight/symptoms.     1.  Chronic diastolic heart failure/HFpEF :  LVEF 60-65%, LV normal size/function, RV mild to moderately dilated, RV systolic function mildly decreased. She has complaints of worsening dyspnea/productive cough. Mildly volume up on exam. Labs from today show stable kidney function and electrolytes, creatinine 1.26. Plan to increase torsemide to 20mg daily.               - NYHA class III, stage C              - Fluid status : mildly volume up              - Diuretic regimen : INCREASE torsemide to 20mg daily              - Aldosterone antagonist : will consider in future.               - Blood pressure : controlled              - Reinforced HF education provided today, reviewed low sodium diet and when to notify CORE clinic.  2. Chronic atrial fibrillation s/p AV node ablation 1/2019 - stable, asymptomatic. Most recent device interrogation was stable.                - Continue  routine follow up with EP and device clinic              - [id number] score 3 (age, female, HF)              - Continue warfarin for thromboembolic prophylaxis  3. Emergent aortic dissection repair 1/4/19              - Repeat CT 6 months after surgery for surveillance completed 8/7/2019 which was stable, showing similar appearance as post repair.   4. Hypertension - controlled, continue current medications  5. Obesity - counseled patient on weight loss strategies.  6. Mild mitral regurgitation  7. Moderate tricuspid regurgitation      Changes today: INCREASE torsemide to 20mg daily until CORE RN Phone visit next week.     Follow up plan:    CORE RN to call on Tuesday. If improvement in symptoms will continue increased dosage and then she will need a BMP check.     Possible BMP with PCP visit on 11/19/20    CORE Follow up with me in 6 week with BMP prior.         History of Presenting Illness:    Priya Funez is a very pleasant 80 year old female with a history of HFpEF, atrial fibrillation s/p AV leo ablation with PPM implantation 1/11/2019, emergent aortic dissection repair 1/4/19, hypertension and obesity.     Patient presented in January with dissecting aortic aneurysm, she was hospitalized from 1/4/19-1/29/19. Patient had a complicated hospital course. There was prolonged ischemia to the SMA and right renal regions. She required tracheostomy placement and was discharged to [place] on ventilatory support. Patients tracheostomy has been discontinued and she transitioned home the beginning of June.      Post hospitalization she was evaluated by Dr. Morales in July 2019 at which point patient was noted to be volume up on exam. Recommended increasing her torsemide to 20mg BID. Lymphedema clinic referral placed and CORE consult ordered. I first met patient for CORE enrollment 7/9/19. She has been following closely in CORE clinic since.      Echocardiogram 6/26/19 showed LVEF 60-65%, LV normal size/function,  "RV mild to moderately dilated, RV systolic function mildly decreased, mild MR, moderate tricuspid regurgitation.  She underwent surveillance CT on 8/7/2019 for aortic dissection repair which was stable, showing similar appearance as post repair.     Over the summer she was having some complaints of dyspnea/cough, this had improved with consistently taking her torsemide. During her last CORE visit 9/1/20 she was doing well from a cardiac perspective. No medication changes were made.     Patient is here today for CORE follow up.      Patient reports feeling okay. Feels her breathing is worse. Continued productive cough, especially at night. Symptoms had improved when she started consistently taking torsemide, but then the past couple months has worsened. Occasionally monitoring weight at home, has notice it increased. Feels weight has increased by 5# over the summer. Complaints of shortness of breath, which she can notice \"even sitting on the couch\". Exertional dyspnea with stairs, has to stop in the middle of the stairs. Sleeping good. Head of bed elevated. Denies orthopnea or PND. Denies lower extremity edema. Patient denies chest pain or chest tightness. Denies dizziness, lightheadedness or other presyncopal symptoms. Denies tachycardia or palpitations. Taking medications daily. Notes her energy levels are good, \"get tired at 10pm at night\".     Labs from 10/30/20 show stable kidney function and electrolytes, creatinine 1.26. Blood pressure 134/80 and HR 86 in clinic today.    Appetite good, \"eating too much\", trying to limit salt, doesn't add salt. No set exercise routine. Getting activity with ADLs. Denies tobacco use. Occasional small glass of wine.         Recent Hospitalizations   1/4/19-1/29/19 r/t dissecting aortic aneurysm, underwent emergent repair 1/4/19. Patient had a complicated hospital course. There was prolonged ischemia to the SMA and right renal regions. She required tracheostomy placement and was " discharged to [place] on ventilatory support.         Social History    , 4 children, 8 grandchildren, enjoys her gardens in the summer.   Social History     Socioeconomic History     Marital status:      Spouse name: Not on file     Number of children: Not on file     Years of education: Not on file     Highest education level: Not on file   Occupational History     Not on file   Social Needs     Financial resource strain: Not on file     Food insecurity     Worry: Not on file     Inability: Not on file     Transportation needs     Medical: Not on file     Non-medical: Not on file   Tobacco Use     Smoking status: Never Smoker     Smokeless tobacco: Never Used     Tobacco comment: hisband   Substance and Sexual Activity     Alcohol use: No     Frequency: Never     Comment: no alcohol 7/9/19     Drug use: No     Sexual activity: Not on file   Lifestyle     Physical activity     Days per week: Not on file     Minutes per session: Not on file     Stress: Not on file   Relationships     Social connections     Talks on phone: Not on file     Gets together: Not on file     Attends Jewish service: Not on file     Active member of club or organization: Not on file     Attends meetings of clubs or organizations: Not on file     Relationship status: Not on file     Intimate partner violence     Fear of current or ex partner: Not on file     Emotionally abused: Not on file     Physically abused: Not on file     Forced sexual activity: Not on file   Other Topics Concern     Parent/sibling w/ CABG, MI or angioplasty before 65F 55M? Not Asked   Social History Narrative     Not on file            Review of Systems:   Skin:  Negative     Eyes:  Positive for glasses  ENT:  Negative    Respiratory:  Positive for cough;shortness of breath;dyspnea at rest;dyspnea on exertion;mucoid expectorant  Cardiovascular:    Positive for;fatigue  Gastroenterology: Negative    Genitourinary:  Positive for urinary  frequency  Musculoskeletal:  Positive for arthritis;joint pain  Neurologic:  Negative    Psychiatric:  Positive for anxiety;depression  Heme/Lymph/Imm:  Positive for allergies  Endocrine:  Negative           Physical Exam:   Vitals: /80 (BP Location: Right arm, Patient Position: Chair, Cuff Size: Adult Regular)   Pulse 86   Wt 72 kg (158 lb 11.2 oz)   SpO2 96%   BMI 27.67 kg/m     Wt Readings from Last 4 Encounters:   11/06/20 72 kg (158 lb 11.2 oz)   09/01/20 71.5 kg (157 lb 11.2 oz)   03/19/20 68.5 kg (151 lb)   12/19/19 68.3 kg (150 lb 8 oz)     GEN: well nourished, in no acute distress.  HEENT:  Pupils equal, round. Sclerae nonicteric.   NECK: Supple, no masses appreciated.   C/V:  Regular rate and rhythm, no murmur, rub or gallop.    RESP: Respirations are unlabored. Clear to auscultation bilaterally without wheezing, rales, or rhonchi.  GI: Abdomen soft, nontender.  EXTREM: Trace bilateral LE edema.  NEURO: Alert and oriented, cooperative.  SKIN: Warm and dry.        Data:   ECHO 6/26/19  The left ventricle is normal in size.  Left ventricular systolic function is normal.  The visual ejection fraction is estimated at 60-65%.  Normal left ventricular wall motion  The right ventricle is mild to moderately dilated.  Mildly decreased right ventricular systolic function  There is mild (1+) mitral regurgitation.  There is moderate (2+) tricuspid regurgitation.  IVC diameter and respiratory changes fall into an intermediate range  suggesting an RA pressure of 8 mmHg.  Right ventricular systolic pressure is elevated, consistent with mild  pulmonary hypertension.     Since the previous study, the RV now appears larger with mildly decreased  function. TR has increased.    LIPID RESULTS:  Lab Results   Component Value Date    TRIG 96 01/28/2019     LIVER ENZYME RESULTS:  Lab Results   Component Value Date    AST 27 04/04/2019    ALT 19 04/04/2019     CBC RESULTS:  Lab Results   Component Value Date    WBC 6.7  04/04/2019    RBC 3.70 (L) 04/04/2019    HGB 13.2 07/15/2020    HCT 31.7 (L) 04/04/2019    MCV 86 04/04/2019    MCH 24.6 (L) 04/04/2019    MCHC 28.7 (L) 04/04/2019    RDW 17.5 (H) 04/04/2019     04/04/2019     BMP RESULTS:  Lab Results   Component Value Date     10/30/2020    POTASSIUM 4.1 10/30/2020    CHLORIDE 103 10/30/2020    CO2 29 10/30/2020    ANIONGAP 11.1 10/30/2020    GLC 94 10/30/2020    BUN 28 10/30/2020    CR 1.26 10/30/2020    GFRESTIMATED 41 (L) 10/30/2020    GFRESTBLACK 50 (L) 10/30/2020    BESS 9.6 10/30/2020      A1C RESULTS:  Lab Results   Component Value Date    A1C 5.3 01/04/2019     INR RESULTS:  Lab Results   Component Value Date    INR 2.66 (H) 04/04/2019    INR 2.32 (H) 01/29/2019            Medications     Current Outpatient Medications   Medication Sig Dispense Refill     Acetaminophen 325 MG CAPS Take 325-650 mg by mouth every 4 hours as needed       aspirin (ASA) 81 MG EC tablet Take 81 mg by mouth every 24 hours       cholecalciferol (VITAMIN D-1000 MAX ST) 1000 units TABS Take 1,000 Units by mouth daily        cyanocobalamin (VITAMIN B-12) 100 MCG tablet Take 1,000 mcg by mouth daily        escitalopram (LEXAPRO) 10 MG tablet Take 10 mg by mouth daily       metoprolol tartrate (LOPRESSOR) 25 MG tablet Take 1 tablet (25 mg) by mouth 2 times daily 180 tablet 3     torsemide (DEMADEX) 20 MG tablet Take 1/2 tablet daily = 10mg daily 45 tablet 3     warfarin ANTICOAGULANT (COUMADIN) 2 MG tablet Take by mouth daily 2 mg Mon, Wed, Fri and 4 mg Sun, Tues, Thurs, Sat.            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, s/p Replacement of the ascending aorta with a 34 mm Hemashield branch graft 1/4/19     Heart failure (H)      Hypertension      Persistent atrial fibrillation (H)     post-op 1/2019: AV node ablation and single chamber pacemaker implanted 1/11/2019     Pleural effusion, bilateral     s/p  left thoracentesis of 150cc 1/11/19;  right thoracentesis of 400cc 1/24/2019     Past Surgical History:   Procedure Laterality Date     ANGIOGRAM Right 1/4/2019    Procedure: DIAGONSTIC ANGIOGRAM OF SMA;  Surgeon: Rigo Jennings MD;  Location:  OR     BYPASS GRAFT ARTERY CORONARY, REPAIR VALVE AORTIC, COMBINED N/A 1/4/2019    Procedure: AORTIC DISSECTION REPAIR WITH GRAFT- HEMASHIELD PLATINUM WOVEN DOUBLE VELOR 4 SIDE ARM VASCULAR GRAFT, D:35 X 12 X 10 X 10MM/ L:50CM;  Surgeon: Jose Winslow MD;  Location:  OR     EP ABLATION AV NODE N/A 1/11/2019    Procedure: EP Ablation AV Node;  Surgeon: Tsering Davey MD;  Location:  HEART CARDIAC CATH LAB     EP PACEMAKER Left 1/11/2019    Procedure: EP Pacemaker;  Surgeon: Tsering Davey MD;  Location:  HEART CARDIAC CATH LAB     TRACHEOSTOMY N/A 1/25/2019    Procedure: TRACHEOSTOMY  (DR MCCLELLAND);  Surgeon: Bryson Mcclelland MD;  Location:  OR     No family history on file.         Allergies   Amoxicillin and Ciprofloxacin        TAMRA Ryan CNP  [facility]  Pager: [phone]        Thank you for allowing me to participate in the care of your patient.    Sincerely,     TAMRA Ryan CNP     [facility]

## 2020-11-06 NOTE — PATIENT INSTRUCTIONS
1. INCREASE torsemide to 20mg daily (1 tablet daily)  2. Monitor weight daily, write down numbers  3. Nurse will call you next week to follow upon weights and breathing.   4. Please call with any questions/concerns [phone]  5. Follow with Xochitl in 6 weeks.   Component      Latest Ref Rng & Units 10/30/2020   Sodium      136 - 145 mmol/L 139   Potassium      3.5 - 5.1 mmol/L 4.1   Chloride      98 - 107 mmol/L 103   Carbon Dioxide      23 - 29 mmol/L 29   Anion Gap      6 - 17 mmol/L 11.1   Glucose      70 - 105 mg/dL 94   Urea Nitrogen      7 - 30 mg/dL 28   Creatinine      0.70 - 1.30 mg/dL 1.26   GFR Estimate      >60 mL/min/1.73:m2 41 (L)   GFR Estimate If Black      >60 mL/min/1.73:m2 50 (L)   Calcium      8.5 - 10.5 mg/dL 9.6

## 2020-11-10 ENCOUNTER — CARE COORDINATION (OUTPATIENT)
Dept: CARDIOLOGY | Facility: CLINIC | Age: 80
End: 2020-11-10

## 2020-11-10 NOTE — PROGRESS NOTES
"[facility] - CORE Clinic    Called patient to assess response to torsemide increase. She was seen by Xochitl Eden last week(11/6) for CORE f/u. She was experiencing increased cough/dyspnea with mild weight gain. She was assessed to be mildly volume up. As a result patients torsemide increased to 20mg/d    Today patient reports that she is feeling better. She stated, \"the last 2 mornings I finally have not woken at 0600 feeling like I couldn't breathe.\" She also feels less SOB going up stairs.     I asked her what her weight has been, she stated, \"it's down about 1#.\" She was not able to give me any exact numbers. She does not remember to weigh herself daily and also often forgets to record it when she does.     Current diuretic regimen:   Torsemide 20mg/d    Future Appointments   Date Time Provider Department Center   11/11/2020 12:00 AM [facility] [address] [facility]   12/17/2020  1:20 PM Sharmaine Burks MD [facility]    12/21/2020  3:50 PM Xochitl Eden APRN CNP [facility]     Confirmed above f/u w/Xochitl. Will forward to Xochitl for plan going forward. Patient verbalized understanding and had no other questions/concerns.  Susie Elena RN on 11/10/2020 at 10:37 AM      "

## 2020-11-11 ENCOUNTER — ANCILLARY PROCEDURE (OUTPATIENT)
Dept: CARDIOLOGY | Facility: CLINIC | Age: 80
End: 2020-11-11
Attending: INTERNAL MEDICINE
Payer: COMMERCIAL

## 2020-11-11 DIAGNOSIS — Z95.0 CARDIAC PACEMAKER IN SITU: ICD-10-CM

## 2020-11-11 DIAGNOSIS — I44.2 COMPLETE HEART BLOCK (H): ICD-10-CM

## 2020-11-11 PROCEDURE — 93294 REM INTERROG EVL PM/LDLS PM: CPT | Performed by: INTERNAL MEDICINE

## 2020-11-11 PROCEDURE — 93296 REM INTERROG EVL PM/IDS: CPT | Performed by: INTERNAL MEDICINE

## 2020-11-12 LAB
MDC_IDC_LEAD_IMPLANT_DT: NORMAL
MDC_IDC_LEAD_LOCATION: NORMAL
MDC_IDC_LEAD_LOCATION_DETAIL_1: NORMAL
MDC_IDC_LEAD_MFG: NORMAL
MDC_IDC_LEAD_MODEL: NORMAL
MDC_IDC_LEAD_POLARITY_TYPE: NORMAL
MDC_IDC_LEAD_SERIAL: NORMAL
MDC_IDC_MSMT_BATTERY_DTM: NORMAL
MDC_IDC_MSMT_BATTERY_REMAINING_LONGEVITY: 137 MO
MDC_IDC_MSMT_BATTERY_RRT_TRIGGER: 2.62
MDC_IDC_MSMT_BATTERY_STATUS: NORMAL
MDC_IDC_MSMT_BATTERY_VOLTAGE: 2.98 V
MDC_IDC_MSMT_LEADCHNL_RV_IMPEDANCE_VALUE: 361 OHM
MDC_IDC_MSMT_LEADCHNL_RV_IMPEDANCE_VALUE: 418 OHM
MDC_IDC_MSMT_LEADCHNL_RV_PACING_THRESHOLD_AMPLITUDE: 0.62 V
MDC_IDC_MSMT_LEADCHNL_RV_PACING_THRESHOLD_PULSEWIDTH: 0.4 MS
MDC_IDC_MSMT_LEADCHNL_RV_SENSING_INTR_AMPL: 7.12 MV
MDC_IDC_MSMT_LEADCHNL_RV_SENSING_INTR_AMPL: 7.12 MV
MDC_IDC_PG_IMPLANT_DTM: NORMAL
MDC_IDC_PG_MFG: NORMAL
MDC_IDC_PG_MODEL: NORMAL
MDC_IDC_PG_SERIAL: NORMAL
MDC_IDC_PG_TYPE: NORMAL
MDC_IDC_SESS_CLINIC_NAME: NORMAL
MDC_IDC_SESS_DTM: NORMAL
MDC_IDC_SESS_TYPE: NORMAL
MDC_IDC_SET_BRADY_HYSTRATE: NORMAL
MDC_IDC_SET_BRADY_LOWRATE: 60 {BEATS}/MIN
MDC_IDC_SET_BRADY_MAX_SENSOR_RATE: 120 {BEATS}/MIN
MDC_IDC_SET_BRADY_MODE: NORMAL
MDC_IDC_SET_LEADCHNL_RV_PACING_AMPLITUDE: 2 V
MDC_IDC_SET_LEADCHNL_RV_PACING_ANODE_ELECTRODE_1: NORMAL
MDC_IDC_SET_LEADCHNL_RV_PACING_ANODE_LOCATION_1: NORMAL
MDC_IDC_SET_LEADCHNL_RV_PACING_CAPTURE_MODE: NORMAL
MDC_IDC_SET_LEADCHNL_RV_PACING_CATHODE_ELECTRODE_1: NORMAL
MDC_IDC_SET_LEADCHNL_RV_PACING_CATHODE_LOCATION_1: NORMAL
MDC_IDC_SET_LEADCHNL_RV_PACING_POLARITY: NORMAL
MDC_IDC_SET_LEADCHNL_RV_PACING_PULSEWIDTH: 0.4 MS
MDC_IDC_SET_LEADCHNL_RV_SENSING_ANODE_ELECTRODE_1: NORMAL
MDC_IDC_SET_LEADCHNL_RV_SENSING_ANODE_LOCATION_1: NORMAL
MDC_IDC_SET_LEADCHNL_RV_SENSING_CATHODE_ELECTRODE_1: NORMAL
MDC_IDC_SET_LEADCHNL_RV_SENSING_CATHODE_LOCATION_1: NORMAL
MDC_IDC_SET_LEADCHNL_RV_SENSING_POLARITY: NORMAL
MDC_IDC_SET_LEADCHNL_RV_SENSING_SENSITIVITY: 0.9 MV
MDC_IDC_SET_ZONE_DETECTION_INTERVAL: 360 MS
MDC_IDC_SET_ZONE_TYPE: NORMAL
MDC_IDC_STAT_BRADY_DTM_END: NORMAL
MDC_IDC_STAT_BRADY_DTM_START: NORMAL
MDC_IDC_STAT_BRADY_RV_PERCENT_PACED: 99.99 %
MDC_IDC_STAT_EPISODE_RECENT_COUNT: 0
MDC_IDC_STAT_EPISODE_RECENT_COUNT: 0
MDC_IDC_STAT_EPISODE_RECENT_COUNT_DTM_END: NORMAL
MDC_IDC_STAT_EPISODE_RECENT_COUNT_DTM_END: NORMAL
MDC_IDC_STAT_EPISODE_RECENT_COUNT_DTM_START: NORMAL
MDC_IDC_STAT_EPISODE_RECENT_COUNT_DTM_START: NORMAL
MDC_IDC_STAT_EPISODE_TOTAL_COUNT: 0
MDC_IDC_STAT_EPISODE_TOTAL_COUNT: 0
MDC_IDC_STAT_EPISODE_TOTAL_COUNT_DTM_END: NORMAL
MDC_IDC_STAT_EPISODE_TOTAL_COUNT_DTM_END: NORMAL
MDC_IDC_STAT_EPISODE_TOTAL_COUNT_DTM_START: NORMAL
MDC_IDC_STAT_EPISODE_TOTAL_COUNT_DTM_START: NORMAL
MDC_IDC_STAT_EPISODE_TYPE: NORMAL

## 2020-11-12 NOTE — PROGRESS NOTES
Reviewed CORE phone encounter.     RECOMMENDATIONS  1. Continue torsemide 20mg daily   2. BMP within the next week    TAMRA Ryan [facility]  Pager: [phone]

## 2020-11-12 NOTE — PROGRESS NOTES
[facility]-CORE Clinic    Talked with Lavonne. She agreed to plan. She will call us if symptoms/weight gains/concerns prior to lab draw.    Unfortunately, our lab has very limited openings next week. Lavonne reports that she has a [facility] [place] this Thursday. She will have BMP drawn there.    [facility] when RN in clinic - reminder to RN board. Also sent separate reminder to RN board to watch for results.     Jessica Leroy, BSN, RN, PHN, HNB-BC   11/12/2020 at 11:35 AM

## 2020-11-16 PROBLEM — I50.9 CHF (CONGESTIVE HEART FAILURE) (H): Status: ACTIVE | Noted: 2020-11-16

## 2020-11-16 NOTE — PROGRESS NOTES
[facility]-CORE Clinic    BMP order faxed to [facility] - Dr. Millan's office.     AYSE tSackN, RN, PHN, HNB-BC   11/16/2020 at 9:04 AM

## 2020-11-20 NOTE — PROGRESS NOTES
[facility]-CORE Clinic  Checked Care Everywhere for BMP results that were to be drawn yesterday at [facility] per KRISTI Mclaughlin. They did not draw full BMP, looks like they draw Na+, K+, Creat, GFR, via ISTAT. See results below. Labs look stable to previous. Labs drawn to assess pt on continued Torsemide 20mg daily.     Also received message from scheduling. Pt called asking whom she should see going forward now that Dr. Morales is retiring and wanting Arnoldo BERRY's input.     Messaged KRISTI Mclaughlin updated lab results and pt question for review.     Future Appointments   Date Time Provider Department Center   12/17/2020  1:20 PM Sharmaine Burks MD [facility]   12/21/2020  3:50 PM Xochitl Eden APRN CNP [facility] PSA CLIN   2/24/2021 12:00 AM [facility] [address] PSA CLIN           WENCESLAO Duarte, RN, CHFN  11/20/20 at 7:54 AM

## 2020-11-20 NOTE — PROGRESS NOTES
[facility]-CORE Clinic  Called pt, reviewed comments and recommendations from KRISTI Mclaughlin. She stated understanding.     AYSE DuarteN, RN, CHFN  11/20/20 at 3:49 PM

## 2020-11-20 NOTE — PROGRESS NOTES
Reviewed CORE phone encounter and lab results. Kidney function stable on increase torsemide. Continue torsemide 20mg daily.     Given Dr. Morales's [facility] we will review cardiologist recommendations during our visit in December. Given current clinic stability she isn't due to see a cardiologist until this spring.     TAMRA Ryan [facility]  Pager: [phone]

## 2020-12-14 ENCOUNTER — TELEPHONE (OUTPATIENT)
Dept: INTERNAL MEDICINE | Facility: CLINIC | Age: 80
End: 2020-12-14

## 2020-12-14 NOTE — TELEPHONE ENCOUNTER
Reason for call:  Other   Patient called regarding (reason for call):   Per patient they would like their medical records requested from [facility] @ [phone] (medical records)    Additional comments: none    Phone number to reach patient:  Cell number on file:    Telephone Information:   Mobile [phone]       Best Time:  any    Can we leave a detailed message on this number?  YES    Travel screening: Not Applicable

## 2020-12-16 PROBLEM — I71.010 ASCENDING AORTIC DISSECTION (H): Status: RESOLVED | Noted: 2019-01-04 | Resolved: 2020-12-16

## 2020-12-16 PROBLEM — J95.03 TRACHEAL STENOSIS FOLLOWING TRACHEOSTOMY (H): Status: RESOLVED | Noted: 2019-03-26 | Resolved: 2020-12-16

## 2020-12-16 PROBLEM — Z78.0 POSTMENOPAUSAL: Status: RESOLVED | Noted: 2017-02-20 | Resolved: 2020-12-16

## 2020-12-16 PROBLEM — R49.0 DYSPHONIA: Status: RESOLVED | Noted: 2019-07-30 | Resolved: 2020-12-16

## 2020-12-16 PROBLEM — I50.9 CHF (CONGESTIVE HEART FAILURE) (H): Status: RESOLVED | Noted: 2020-11-16 | Resolved: 2020-12-16

## 2020-12-16 PROBLEM — Z93.0 TRACHEOSTOMY STATUS (H): Status: RESOLVED | Noted: 2019-03-26 | Resolved: 2020-12-16

## 2020-12-17 ENCOUNTER — OFFICE VISIT (OUTPATIENT)
Dept: INTERNAL MEDICINE | Facility: CLINIC | Age: 80
End: 2020-12-17
Payer: COMMERCIAL

## 2020-12-17 VITALS
TEMPERATURE: 96.9 F | RESPIRATION RATE: 16 BRPM | BODY MASS INDEX: 27.72 KG/M2 | DIASTOLIC BLOOD PRESSURE: 62 MMHG | WEIGHT: 159 LBS | SYSTOLIC BLOOD PRESSURE: 124 MMHG | OXYGEN SATURATION: 97 % | HEART RATE: 88 BPM

## 2020-12-17 DIAGNOSIS — Z79.01 CURRENT USE OF LONG TERM ANTICOAGULATION: ICD-10-CM

## 2020-12-17 DIAGNOSIS — N18.32 STAGE 3B CHRONIC KIDNEY DISEASE (H): Primary | ICD-10-CM

## 2020-12-17 DIAGNOSIS — Z76.89 ENCOUNTER TO ESTABLISH CARE: ICD-10-CM

## 2020-12-17 DIAGNOSIS — I48.20 CHRONIC ATRIAL FIBRILLATION (H): ICD-10-CM

## 2020-12-17 PROBLEM — J96.01 ACUTE RESPIRATORY FAILURE WITH HYPOXIA (H): Status: RESOLVED | Noted: 2020-12-17 | Resolved: 2020-12-17

## 2020-12-17 PROBLEM — J96.01 ACUTE RESPIRATORY FAILURE WITH HYPOXIA (H): Status: ACTIVE | Noted: 2020-12-17

## 2020-12-17 PROCEDURE — 99203 OFFICE O/P NEW LOW 30 MIN: CPT | Performed by: INTERNAL MEDICINE

## 2020-12-17 RX ORDER — ALENDRONATE SODIUM 70 MG/1
70 TABLET ORAL
COMMUNITY
Start: 2020-12-07 | End: 2021-03-22

## 2020-12-17 SDOH — ECONOMIC STABILITY: INCOME INSECURITY: HOW HARD IS IT FOR YOU TO PAY FOR THE VERY BASICS LIKE FOOD, HOUSING, MEDICAL CARE, AND HEATING?: NOT ASKED

## 2020-12-17 SDOH — HEALTH STABILITY: MENTAL HEALTH: HOW OFTEN DO YOU HAVE 6 OR MORE DRINKS ON ONE OCCASION?: NOT ASKED

## 2020-12-17 SDOH — ECONOMIC STABILITY: TRANSPORTATION INSECURITY
IN THE PAST 12 MONTHS, HAS LACK OF TRANSPORTATION KEPT YOU FROM MEETINGS, WORK, OR FROM GETTING THINGS NEEDED FOR DAILY LIVING?: NOT ASKED

## 2020-12-17 SDOH — HEALTH STABILITY: MENTAL HEALTH: HOW OFTEN DO YOU HAVE A DRINK CONTAINING ALCOHOL?: NOT ASKED

## 2020-12-17 SDOH — ECONOMIC STABILITY: FOOD INSECURITY: WITHIN THE PAST 12 MONTHS, YOU WORRIED THAT YOUR FOOD WOULD RUN OUT BEFORE YOU GOT MONEY TO BUY MORE.: NOT ASKED

## 2020-12-17 SDOH — ECONOMIC STABILITY: TRANSPORTATION INSECURITY
IN THE PAST 12 MONTHS, HAS THE LACK OF TRANSPORTATION KEPT YOU FROM MEDICAL APPOINTMENTS OR FROM GETTING MEDICATIONS?: NOT ASKED

## 2020-12-17 SDOH — HEALTH STABILITY: MENTAL HEALTH: HOW MANY STANDARD DRINKS CONTAINING ALCOHOL DO YOU HAVE ON A TYPICAL DAY?: NOT ASKED

## 2020-12-17 SDOH — ECONOMIC STABILITY: FOOD INSECURITY: WITHIN THE PAST 12 MONTHS, THE FOOD YOU BOUGHT JUST DIDN'T LAST AND YOU DIDN'T HAVE MONEY TO GET MORE.: NOT ASKED

## 2020-12-17 NOTE — PROGRESS NOTES
SUBJECTIVE                                                      HPI: Priya Funez is a very pleasant 80 year old female who presents to [facility] care:    PMH, PSH, FH, SH, medications, allergies, immunizations, and preventative health measures reviewed and updated as appropriate.    Past Medical History:   Diagnosis Date     Benign essential hypertension      Chronic atrial fibrillation (H)     s/p AV node ablation January, 2019     Chronic diastolic heart failure (H)      Chronic kidney disease, stage III (moderate)      Re: HTN: well-controlled on current regimen.    Re: CAF: followed by cardiology; on coumadin and metoprolol.   Re: HFpEF: followed by cardiology; on torsemide and metoprolol.  Re: CKD III: stable.    Past Surgical History:   Procedure Laterality Date     ANGIOGRAM Right 01/04/2019    Procedure: DIAGONSTIC ANGIOGRAM OF SMA;  Surgeon: Rigo Jennings MD;  Location:  OR     BYPASS GRAFT ARTERY CORONARY, REPAIR VALVE AORTIC, COMBINED N/A 01/04/2019    Procedure: AORTIC DISSECTION REPAIR WITH GRAFT- HEMASHIELD PLATINUM WOVEN DOUBLE VELOR 4 SIDE ARM VASCULAR GRAFT, D:35 X 12 X 10 X 10MM/ L:50CM;  Surgeon: Jose Winslow MD;  Location:  OR     EP ABLATION AV NODE N/A 01/11/2019    Procedure: EP Ablation AV Node;  Surgeon: Tsering Davey MD;  Location:  HEART CARDIAC CATH LAB     EP PACEMAKER Left 01/11/2019    Procedure: EP Pacemaker;  Surgeon: Tsering Davey MD;  Location:  HEART CARDIAC CATH LAB     THYROID SURGERY      benign mass removed     TRACHEOSTOMY N/A 01/25/2019    Procedure: TRACHEOSTOMY  (DR MCCLELLAND);  Surgeon: Bryson Mcclelland MD;  Location:  OR     Family History   Problem Relation Age of Onset     Diabetes No family hx of      Myocardial Infarction No family hx of      Cerebrovascular Disease No family hx of      Coronary Artery Disease Early Onset No family hx of      Breast Cancer No family hx of      Colon Cancer No family hx of      Ovarian Cancer No  family hx of      Social History     Occupational History     Occupation: Retired - customer service   Tobacco Use     Smoking status: Never Smoker     Smokeless tobacco: Never Used   Substance and Sexual Activity     Alcohol use: Not Currently     Drug use: No     Sexual activity: Not on file   Social History Narrative    .    Lives in home with . Fully independent.    4 adult children.    8 grandchildren.     Allergies   Allergen Reactions     Amoxicillin Swelling     Face and body     Ciprofloxacin Hives     Current Outpatient Medications   Medication Sig     Acetaminophen 325 MG CAPS Take 325-650 mg by mouth every 4 hours as needed     alendronate (FOSAMAX) 70 MG tablet Take 70 mg by mouth     aspirin (ASA) 81 MG EC tablet Take 81 mg by mouth every 24 hours     cholecalciferol (VITAMIN D-1000 MAX ST) 1000 units TABS Take 1,000 Units by mouth daily      cyanocobalamin (VITAMIN B-12) 100 MCG tablet Take 1,000 mcg by mouth daily      escitalopram (LEXAPRO) 10 MG tablet Take 10 mg by mouth daily     metoprolol tartrate (LOPRESSOR) 25 MG tablet Take 1 tablet (25 mg) by mouth 2 times daily     torsemide (DEMADEX) 20 MG tablet Take 1 tablet (20 mg) by mouth daily     warfarin ANTICOAGULANT (COUMADIN) 2 MG tablet Take by mouth daily 2 mg Mon, Wed, Fri and 4 mg Sun, Tues, Thurs, Sat.     Immunization History   Administered Date(s) Administered     FLUAD -HD 65+ QUAD ([facility] ONLY) 10/15/2020     Pneumo Conj 13-V (2010&after) 02/22/2017     Pneumococcal 23 valent 05/01/2009     TDAP Vaccine (Adacel) 12/15/2014     OBJECTIVE                                                      /62 (BP Location: Left arm, Patient Position: Chair, Cuff Size: Adult Regular)   Pulse 88   Temp 96.9  F (36.1  C) (Temporal)   Resp 16   Wt 72.1 kg (159 lb)   SpO2 97%   BMI 27.72 kg/m    Constitutional: well-appearing  Gastrointestinal: soft, non-tender, and non-distended; no organomegaly or masses  Musculoskeletal:  walks with walker  Psych: normal judgment and insight; normal mood and affect; recent and remote memory intact    PREVENTATIVE HEALTH                                                      BMI: within normal limits (for age)  Breast CA screening: screening no longer indicated   Cervical CA screening: screening no longer indicated   Colon CA screening: screening no longer indicated   Lung CA screening: n/a   Dexa: DUE - did not discuss  Screening HCV: n/a   Screening HIV: n/a   Screening cholesterol: screening no longer indicated   Screening diabetes: up to date   STD testing: no risk factors present  Alcohol misuse screening: alcohol use reviewed - no intervention indicated at this time  Immunizations: reviewed; Shingrix series DUE - not covered by Medicare (may obtain in pharmacy if desired)     ASSESSMENT/PLAN                                                       (N18.32) Stage 3b chronic kidney disease  (primary encounter diagnosis)  Comment: stable.     (I48.20) Chronic atrial fibrillation (H)  (Z79.01) Current use of long term anticoagulation  Plan: referred to [facility] to establish care.     (Z76.89) Encounter to establish care  Comment: PMH, PSH, FH, SH, medications, allergies, immunizations, and preventative health measures reviewed and updated as appropriate.    Sharmaine Burks MD   [address] [address]  T: [phone], F: [phone]  (Note was completed, in part, with [employer] voice-recognition software. Documentation was reviewed, but some grammatical, spelling, and word errors may remain.)

## 2020-12-18 DIAGNOSIS — I48.20 CHRONIC ATRIAL FIBRILLATION (H): Primary | ICD-10-CM

## 2020-12-21 ENCOUNTER — OFFICE VISIT (OUTPATIENT)
Dept: CARDIOLOGY | Facility: CLINIC | Age: 80
End: 2020-12-21
Payer: COMMERCIAL

## 2020-12-21 VITALS
BODY MASS INDEX: 27.72 KG/M2 | SYSTOLIC BLOOD PRESSURE: 133 MMHG | OXYGEN SATURATION: 99 % | HEART RATE: 79 BPM | WEIGHT: 159 LBS | DIASTOLIC BLOOD PRESSURE: 74 MMHG

## 2020-12-21 DIAGNOSIS — I10 BENIGN ESSENTIAL HYPERTENSION: ICD-10-CM

## 2020-12-21 DIAGNOSIS — I50.32 CHRONIC DIASTOLIC CONGESTIVE HEART FAILURE (H): Primary | ICD-10-CM

## 2020-12-21 DIAGNOSIS — I48.20 CHRONIC ATRIAL FIBRILLATION (H): ICD-10-CM

## 2020-12-21 PROCEDURE — 99214 OFFICE O/P EST MOD 30 MIN: CPT | Performed by: NURSE PRACTITIONER

## 2020-12-21 RX ORDER — TORSEMIDE 20 MG/1
20 TABLET ORAL DAILY
Qty: 90 TABLET | Refills: 3 | Status: SHIPPED | OUTPATIENT
Start: 2020-12-21 | End: 2021-06-15

## 2020-12-21 NOTE — LETTER
12/21/2020    Sharmaine Burks MD  [address] [facility] [address]    RE: Priya Funez       Dear Colleague,    I had the pleasure of seeing Priya Funez in the [facility].    Cardiology Clinic Progress Note  Priya Funez MRN# [id number]   YOB: 1940 Age: 80 year old   Primary Cardiologist: Dr. Morales Reason for visit: CORE follow up            Assessment and Plan:   Priya Funez is a very pleasant 80 year old female with a history of HFpEF, atrial fibrillation s/p AV leo ablation with PPM implantation 1/11/2019, emergent aortic dissection repair 1/4/19, hypertension and obesity.      She was seen today for CORE follow up she has noticed a decrease in her dyspnea and almost resolution of her cough since increasing her torsemide. Overall weight is about the same. She needs her labs ordered which we will try getting done on her way out otherwise she states she can come back soon to have them done if needed. No changes today. Continue torsemide 20mg daily.      1.  Chronic diastolic heart failure/HFpEF :  LVEF 60-65%, LV normal size/function, RV mild to moderately dilated, RV systolic function mildly decreased. She appears compensated and euvolemic on exam. Improved HF symptoms on increased torsemide, plan to continue torsemide 20mg daily.               - NYHA class III, stage C              - Fluid status : mildly volume up              - Diuretic regimen : torsemide to 20mg daily              - Aldosterone antagonist : will consider in future.               - Blood pressure : controlled              - Reinforced HF education provided today, reviewed low sodium diet and when to notify CORE clinic.    2. Chronic atrial fibrillation s/p AV node ablation 1/2019 - stable, asymptomatic. Most recent device interrogation was stable.                - Continue routine follow up with EP and device clinic              - [id number] score 3 (age, female,  HF)              - Continue warfarin for thromboembolic prophylaxis  3. Emergent aortic dissection repair 1/4/19              - Repeat CT 6 months after surgery for surveillance completed 8/7/2019 which was stable, showing similar appearance as post repair.   4. Hypertension - controlled, continue current medications  5. Obesity - counseled patient on weight loss strategies.  6. Mild mitral regurgitation  7. Moderate tricuspid regurgitation      Changes today: none    Follow up plan:     BMP today on way out, if abnormal will schedule sooner CORE followup.     Establish care with Dr. Fuentes in 3 months    Follow up with in CORE in 6 months, sooner if needed.         History of Presenting Illness:    Priya Funez is a very pleasant 80 year old female with a history of HFpEF, atrial fibrillation s/p AV leo ablation with PPM implantation 1/11/2019, emergent aortic dissection repair 1/4/19, hypertension and obesity.      Patient presented in January with dissecting aortic aneurysm, she was hospitalized from 1/4/19-1/29/19. Patient had a complicated hospital course. There was prolonged ischemia to the SMA and right renal regions. She required tracheostomy placement and was discharged to [place] on ventilatory support. Patients tracheostomy has been discontinued and she transitioned home the beginning of June.      Post hospitalization she was evaluated by Dr. Morales in July 2019 at which point patient was noted to be volume up on exam. Recommended increasing her torsemide to 20mg BID. Lymphedema clinic referral placed and CORE consult ordered. I first met patient for CORE enrollment 7/9/19. She has been following closely in CORE clinic since.      Echocardiogram 6/26/19 showed LVEF 60-65%, LV normal size/function, RV mild to moderately dilated, RV systolic function mildly decreased, mild MR, moderate tricuspid regurgitation.  She underwent surveillance CT on 8/7/2019 for aortic dissection repair which was  "stable, showing similar appearance as post repair.      During our last CORE visit she was having complaints of worsening dyspnea and cough, her torsemide was increased to 20mg daily which resulted in improved symptoms.      Patient is here today for CORE follow up.     Patient reports feeling good. Monitoring weights daily a home. Weight at home today 157#. Notes it has been overall stable. Feels her breathing and cough has improved on increased torsemide 20mg daily. Denies lower extremity. Denies orthopnea or PND, notes this has also improved. Denies abdominal distention/bloating. Denies chest pain or chest tightness. Denies dizziness, lightheadedness or other presyncopal symptoms. Denies tachycardia or palpitations.     No recent labs, needs to get labs drawn on the way out if possible, otherwise soon. Blood pressure 133/74 and HR 79 in clinic today.    Appetite good, \"too good\". Eating most meals at home. Not adding additional salt on foods. No set exercise routine, has been starting to walk at [employer] with her . Denies alcohol use. Occasional small glass of wine.         Recent Hospitalizations   1/4/19-1/29/19 r/t dissecting aortic aneurysm, underwent emergent repair 1/4/19. Patient had a complicated hospital course. There was prolonged ischemia to the SMA and right renal regions. She required tracheostomy placement and was discharged to [place] on ventilatory support.         Social History    , 4 children, 8 grandchildren, enjoys her gardens in the summer.   Social History     Socioeconomic History     Marital status:      Spouse name: Not on file     Number of children: Not on file     Years of education: Not on file     Highest education level: Not on file   Occupational History     Occupation: Retired - customer service   Social Needs     Financial resource strain: Not on file     Food insecurity     Worry: Not on file     Inability: Not on file     Transportation needs     " Medical: Not on file     Non-medical: Not on file   Tobacco Use     Smoking status: Never Smoker     Smokeless tobacco: Never Used   Substance and Sexual Activity     Alcohol use: Not Currently     Drug use: No     Sexual activity: Not on file   Lifestyle     Physical activity     Days per week: Not on file     Minutes per session: Not on file     Stress: Not on file   Relationships     Social connections     Talks on phone: Not on file     Gets together: Not on file     Attends Religion service: Not on file     Active member of club or organization: Not on file     Attends meetings of clubs or organizations: Not on file     Relationship status: Not on file     Intimate partner violence     Fear of current or ex partner: Not on file     Emotionally abused: Not on file     Physically abused: Not on file     Forced sexual activity: Not on file   Other Topics Concern     Parent/sibling w/ CABG, MI or angioplasty before 65F 55M? Not Asked   Social History Narrative    .    Lives in home with . Fully independent.    4 adult children.    8 grandchildren.            Review of Systems:   Skin:  Negative     Eyes:  Positive for glasses  ENT:  Negative    Respiratory:  Positive for cough;shortness of breath;dyspnea on exertion;mucoid expectorant  Cardiovascular:    Positive for;fatigue  Gastroenterology: Negative    Genitourinary:  Positive for urinary frequency  Musculoskeletal:  Positive for arthritis;joint pain  Neurologic:  Negative    Psychiatric:  Positive for anxiety;depression  Heme/Lymph/Imm:  Positive for allergies  Endocrine:  Negative           Physical Exam:   Vitals: /74   Pulse 79   Wt 72.1 kg (159 lb)   SpO2 99%   BMI 27.72 kg/m     Wt Readings from Last 4 Encounters:   12/21/20 72.1 kg (159 lb)   12/17/20 72.1 kg (159 lb)   11/06/20 72 kg (158 lb 11.2 oz)   09/01/20 71.5 kg (157 lb 11.2 oz)     GEN: well nourished, in no acute distress.  HEENT:  Pupils equal, round. Sclerae  nonicteric.   NECK: Supple, no masses appreciated. No JVD appreciated on exam.   C/V:  Regular rate and rhythm, no murmur, rub or gallop.    RESP: Respirations are unlabored. Clear to auscultation bilaterally without wheezing, rales, or rhonchi.  GI: Abdomen soft, nontender, obese  EXTREM: No LE edema.  NEURO: Alert and oriented, cooperative.  SKIN: Warm and dry       Data:   ECHO 6/26/19  The left ventricle is normal in size.  Left ventricular systolic function is normal.  The visual ejection fraction is estimated at 60-65%.  Normal left ventricular wall motion  The right ventricle is mild to moderately dilated.  Mildly decreased right ventricular systolic function  There is mild (1+) mitral regurgitation.  There is moderate (2+) tricuspid regurgitation.  IVC diameter and respiratory changes fall into an intermediate range  suggesting an RA pressure of 8 mmHg.  Right ventricular systolic pressure is elevated, consistent with mild  pulmonary hypertension.     Since the previous study, the RV now appears larger with mildly decreased function. TR has increased.    LIPID RESULTS:  Lab Results   Component Value Date    TRIG 96 01/28/2019     LIVER ENZYME RESULTS:  Lab Results   Component Value Date    AST 27 04/04/2019    ALT 19 04/04/2019     CBC RESULTS:  Lab Results   Component Value Date    WBC 6.7 04/04/2019    RBC 3.70 (L) 04/04/2019    HGB 13.2 07/15/2020    HCT 31.7 (L) 04/04/2019    MCV 86 04/04/2019    MCH 24.6 (L) 04/04/2019    MCHC 28.7 (L) 04/04/2019    RDW 17.5 (H) 04/04/2019     04/04/2019     BMP RESULTS:  Lab Results   Component Value Date     10/30/2020    POTASSIUM 4.1 10/30/2020    CHLORIDE 103 10/30/2020    CO2 29 10/30/2020    ANIONGAP 11.1 10/30/2020    GLC 94 10/30/2020    BUN 28 10/30/2020    CR 1.26 10/30/2020    GFRESTIMATED 41 (L) 10/30/2020    GFRESTBLACK 50 (L) 10/30/2020    BESS 9.6 10/30/2020      A1C RESULTS:  Lab Results   Component Value Date    A1C 5.3 01/04/2019     INR  RESULTS:  Lab Results   Component Value Date    INR 2.66 (H) 04/04/2019    INR 2.32 (H) 01/29/2019            Medications     Current Outpatient Medications   Medication Sig Dispense Refill     Acetaminophen 325 MG CAPS Take 325-650 mg by mouth every 4 hours as needed       alendronate (FOSAMAX) 70 MG tablet Take 70 mg by mouth       aspirin (ASA) 81 MG EC tablet Take 81 mg by mouth every 24 hours       cholecalciferol (VITAMIN D-1000 MAX ST) 1000 units TABS Take 1,000 Units by mouth daily        cyanocobalamin (VITAMIN B-12) 100 MCG tablet Take 1,000 mcg by mouth daily        escitalopram (LEXAPRO) 10 MG tablet Take 10 mg by mouth daily       metoprolol tartrate (LOPRESSOR) 25 MG tablet Take 1 tablet (25 mg) by mouth 2 times daily 180 tablet 3     torsemide (DEMADEX) 20 MG tablet Take 1 tablet (20 mg) by mouth daily 45 tablet 3     warfarin ANTICOAGULANT (COUMADIN) 2 MG tablet Take by mouth daily 2 mg Mon, Wed, Fri and 4 mg Sun, Tues, Thurs, Sat.            Past Medical History     Past Medical History:   Diagnosis Date     Benign essential hypertension      Chronic atrial fibrillation (H)     s/p AV node ablation January, 2019     Chronic diastolic heart failure (H)      Chronic kidney disease, stage III (moderate)      Past Surgical History:   Procedure Laterality Date     ANGIOGRAM Right 01/04/2019    Procedure: DIAGONSTIC ANGIOGRAM OF SMA;  Surgeon: Rigo Jennings MD;  Location:  OR     BYPASS GRAFT ARTERY CORONARY, REPAIR VALVE AORTIC, COMBINED N/A 01/04/2019    Procedure: AORTIC DISSECTION REPAIR WITH GRAFT- HEMASHIELD PLATINUM WOVEN DOUBLE VELOR 4 SIDE ARM VASCULAR GRAFT, D:35 X 12 X 10 X 10MM/ L:50CM;  Surgeon: Jose Winslow MD;  Location:  OR     EP ABLATION AV NODE N/A 01/11/2019    Procedure: EP Ablation AV Node;  Surgeon: Tsering Davey MD;  Location:  HEART CARDIAC CATH LAB     EP PACEMAKER Left 01/11/2019    Procedure: EP Pacemaker;  Surgeon: Tsering Davey MD;  Location:  HEART  CARDIAC CATH LAB     THYROID SURGERY      benign mass removed     TRACHEOSTOMY N/A 01/25/2019    Procedure: TRACHEOSTOMY  (DR MCCLELLAND);  Surgeon: Bryson Mcclelland MD;  Location:  OR     Family History   Problem Relation Age of Onset     Diabetes No family hx of      Myocardial Infarction No family hx of      Cerebrovascular Disease No family hx of      Coronary Artery Disease Early Onset No family hx of      Breast Cancer No family hx of      Colon Cancer No family hx of      Ovarian Cancer No family hx of             Allergies   Amoxicillin and Ciprofloxacin        TAMRA Ryan CNP  [facility]  Pager: [phone]        Thank you for allowing me to participate in the care of your patient.    Sincerely,     TAMRA Ryan CNP     [facility]

## 2020-12-21 NOTE — PATIENT INSTRUCTIONS
1. No medication changes  2. Get labs on the way out today  3. Follow up with Dr. Kwok in 3 months  4. Follow up with [facility] in [facility] [place], sooner if needed.   5. Please call with any questions/concerns [phone]

## 2020-12-21 NOTE — PROGRESS NOTES
Cardiology Clinic Progress Note  Priya Funez MRN# [id number]   YOB: 1940 Age: 80 year old   Primary Cardiologist: Dr. Morales Reason for visit: CORE follow up            Assessment and Plan:   Priya Funez is a very pleasant 80 year old female with a history of HFpEF, atrial fibrillation s/p AV leo ablation with PPM implantation 1/11/2019, emergent aortic dissection repair 1/4/19, hypertension and obesity.      She was seen today for CORE follow up she has noticed a decrease in her dyspnea and almost resolution of her cough since increasing her torsemide. Overall weight is about the same. She needs her labs ordered which we will try getting done on her way out otherwise she states she can come back soon to have them done if needed. No changes today. Continue torsemide 20mg daily.      1.  Chronic diastolic heart failure/HFpEF :  LVEF 60-65%, LV normal size/function, RV mild to moderately dilated, RV systolic function mildly decreased. She appears compensated and euvolemic on exam. Improved HF symptoms on increased torsemide, plan to continue torsemide 20mg daily.               - NYHA class III, stage C              - Fluid status : mildly volume up              - Diuretic regimen : torsemide to 20mg daily              - Aldosterone antagonist : will consider in future.               - Blood pressure : controlled              - Reinforced HF education provided today, reviewed low sodium diet and when to notify CORE clinic.    2. Chronic atrial fibrillation s/p AV node ablation 1/2019 - stable, asymptomatic. Most recent device interrogation was stable.                - Continue routine follow up with EP and device clinic              - [id number] score 3 (age, female, HF)              - Continue warfarin for thromboembolic prophylaxis  3. Emergent aortic dissection repair 1/4/19              - Repeat CT 6 months after surgery for surveillance completed 8/7/2019 which was stable, showing  similar appearance as post repair.   4. Hypertension - controlled, continue current medications  5. Obesity - counseled patient on weight loss strategies.  6. Mild mitral regurgitation  7. Moderate tricuspid regurgitation      Changes today: none    Follow up plan:     BMP today on way out, if abnormal will schedule sooner CORE followup.     Establish care with Dr. Fuentes in 3 months    Follow up with in CORE in 6 months, sooner if needed.         History of Presenting Illness:    Priya Funez is a very pleasant 80 year old female with a history of HFpEF, atrial fibrillation s/p AV leo ablation with PPM implantation 1/11/2019, emergent aortic dissection repair 1/4/19, hypertension and obesity.      Patient presented in January with dissecting aortic aneurysm, she was hospitalized from 1/4/19-1/29/19. Patient had a complicated hospital course. There was prolonged ischemia to the SMA and right renal regions. She required tracheostomy placement and was discharged to [place] on ventilatory support. Patients tracheostomy has been discontinued and she transitioned home the beginning of June.      Post hospitalization she was evaluated by Dr. Morales in July 2019 at which point patient was noted to be volume up on exam. Recommended increasing her torsemide to 20mg BID. Lymphedema clinic referral placed and CORE consult ordered. I first met patient for CORE enrollment 7/9/19. She has been following closely in CORE clinic since.      Echocardiogram 6/26/19 showed LVEF 60-65%, LV normal size/function, RV mild to moderately dilated, RV systolic function mildly decreased, mild MR, moderate tricuspid regurgitation.  She underwent surveillance CT on 8/7/2019 for aortic dissection repair which was stable, showing similar appearance as post repair.      During our last CORE visit she was having complaints of worsening dyspnea and cough, her torsemide was increased to 20mg daily which resulted in improved symptoms.  "     Patient is here today for CORE follow up.     Patient reports feeling good. Monitoring weights daily a home. Weight at home today 157#. Notes it has been overall stable. Feels her breathing and cough has improved on increased torsemide 20mg daily. Denies lower extremity. Denies orthopnea or PND, notes this has also improved. Denies abdominal distention/bloating. Denies chest pain or chest tightness. Denies dizziness, lightheadedness or other presyncopal symptoms. Denies tachycardia or palpitations.     No recent labs, needs to get labs drawn on the way out if possible, otherwise soon. Blood pressure 133/74 and HR 79 in clinic today.    Appetite good, \"too good\". Eating most meals at home. Not adding additional salt on foods. No set exercise routine, has been starting to walk at [employer] [place] with her . Denies alcohol use. Occasional small glass of wine.         Recent Hospitalizations   1/4/19-1/29/19 r/t dissecting aortic aneurysm, underwent emergent repair 1/4/19. Patient had a complicated hospital course. There was prolonged ischemia to the SMA and right renal regions. She required tracheostomy placement and was discharged to [place] on ventilatory support.         Social History    , 4 children, 8 grandchildren, enjoys her gardens in the summer.   Social History     Socioeconomic History     Marital status:      Spouse name: Not on file     Number of children: Not on file     Years of education: Not on file     Highest education level: Not on file   Occupational History     Occupation: Retired - customer service   Social Needs     Financial resource strain: Not on file     Food insecurity     Worry: Not on file     Inability: Not on file     Transportation needs     Medical: Not on file     Non-medical: Not on file   Tobacco Use     Smoking status: Never Smoker     Smokeless tobacco: Never Used   Substance and Sexual Activity     Alcohol use: Not Currently     Drug use: No     Sexual " activity: Not on file   Lifestyle     Physical activity     Days per week: Not on file     Minutes per session: Not on file     Stress: Not on file   Relationships     Social connections     Talks on phone: Not on file     Gets together: Not on file     Attends Yazidi service: Not on file     Active member of club or organization: Not on file     Attends meetings of clubs or organizations: Not on file     Relationship status: Not on file     Intimate partner violence     Fear of current or ex partner: Not on file     Emotionally abused: Not on file     Physically abused: Not on file     Forced sexual activity: Not on file   Other Topics Concern     Parent/sibling w/ CABG, MI or angioplasty before 65F 55M? Not Asked   Social History Narrative    .    Lives in home with . Fully independent.    4 adult children.    8 grandchildren.            Review of Systems:   Skin:  Negative     Eyes:  Positive for glasses  ENT:  Negative    Respiratory:  Positive for cough;shortness of breath;dyspnea on exertion;mucoid expectorant  Cardiovascular:    Positive for;fatigue  Gastroenterology: Negative    Genitourinary:  Positive for urinary frequency  Musculoskeletal:  Positive for arthritis;joint pain  Neurologic:  Negative    Psychiatric:  Positive for anxiety;depression  Heme/Lymph/Imm:  Positive for allergies  Endocrine:  Negative           Physical Exam:   Vitals: /74   Pulse 79   Wt 72.1 kg (159 lb)   SpO2 99%   BMI 27.72 kg/m     Wt Readings from Last 4 Encounters:   12/21/20 72.1 kg (159 lb)   12/17/20 72.1 kg (159 lb)   11/06/20 72 kg (158 lb 11.2 oz)   09/01/20 71.5 kg (157 lb 11.2 oz)     GEN: well nourished, in no acute distress.  HEENT:  Pupils equal, round. Sclerae nonicteric.   NECK: Supple, no masses appreciated. No JVD appreciated on exam.   C/V:  Regular rate and rhythm, no murmur, rub or gallop.    RESP: Respirations are unlabored. Clear to auscultation bilaterally without wheezing,  rales, or rhonchi.  GI: Abdomen soft, nontender, obese  EXTREM: No LE edema.  NEURO: Alert and oriented, cooperative.  SKIN: Warm and dry       Data:   ECHO 6/26/19  The left ventricle is normal in size.  Left ventricular systolic function is normal.  The visual ejection fraction is estimated at 60-65%.  Normal left ventricular wall motion  The right ventricle is mild to moderately dilated.  Mildly decreased right ventricular systolic function  There is mild (1+) mitral regurgitation.  There is moderate (2+) tricuspid regurgitation.  IVC diameter and respiratory changes fall into an intermediate range  suggesting an RA pressure of 8 mmHg.  Right ventricular systolic pressure is elevated, consistent with mild  pulmonary hypertension.     Since the previous study, the RV now appears larger with mildly decreased function. TR has increased.    LIPID RESULTS:  Lab Results   Component Value Date    TRIG 96 01/28/2019     LIVER ENZYME RESULTS:  Lab Results   Component Value Date    AST 27 04/04/2019    ALT 19 04/04/2019     CBC RESULTS:  Lab Results   Component Value Date    WBC 6.7 04/04/2019    RBC 3.70 (L) 04/04/2019    HGB 13.2 07/15/2020    HCT 31.7 (L) 04/04/2019    MCV 86 04/04/2019    MCH 24.6 (L) 04/04/2019    MCHC 28.7 (L) 04/04/2019    RDW 17.5 (H) 04/04/2019     04/04/2019     BMP RESULTS:  Lab Results   Component Value Date     10/30/2020    POTASSIUM 4.1 10/30/2020    CHLORIDE 103 10/30/2020    CO2 29 10/30/2020    ANIONGAP 11.1 10/30/2020    GLC 94 10/30/2020    BUN 28 10/30/2020    CR 1.26 10/30/2020    GFRESTIMATED 41 (L) 10/30/2020    GFRESTBLACK 50 (L) 10/30/2020    BESS 9.6 10/30/2020      A1C RESULTS:  Lab Results   Component Value Date    A1C 5.3 01/04/2019     INR RESULTS:  Lab Results   Component Value Date    INR 2.66 (H) 04/04/2019    INR 2.32 (H) 01/29/2019            Medications     Current Outpatient Medications   Medication Sig Dispense Refill     Acetaminophen 325 MG CAPS Take  325-650 mg by mouth every 4 hours as needed       alendronate (FOSAMAX) 70 MG tablet Take 70 mg by mouth       aspirin (ASA) 81 MG EC tablet Take 81 mg by mouth every 24 hours       cholecalciferol (VITAMIN D-1000 MAX ST) 1000 units TABS Take 1,000 Units by mouth daily        cyanocobalamin (VITAMIN B-12) 100 MCG tablet Take 1,000 mcg by mouth daily        escitalopram (LEXAPRO) 10 MG tablet Take 10 mg by mouth daily       metoprolol tartrate (LOPRESSOR) 25 MG tablet Take 1 tablet (25 mg) by mouth 2 times daily 180 tablet 3     torsemide (DEMADEX) 20 MG tablet Take 1 tablet (20 mg) by mouth daily 45 tablet 3     warfarin ANTICOAGULANT (COUMADIN) 2 MG tablet Take by mouth daily 2 mg Mon, Wed, Fri and 4 mg Sun, Tues, Thurs, Sat.            Past Medical History     Past Medical History:   Diagnosis Date     Benign essential hypertension      Chronic atrial fibrillation (H)     s/p AV node ablation January, 2019     Chronic diastolic heart failure (H)      Chronic kidney disease, stage III (moderate)      Past Surgical History:   Procedure Laterality Date     ANGIOGRAM Right 01/04/2019    Procedure: DIAGONSTIC ANGIOGRAM OF SMA;  Surgeon: Rigo Jennings MD;  Location:  OR     BYPASS GRAFT ARTERY CORONARY, REPAIR VALVE AORTIC, COMBINED N/A 01/04/2019    Procedure: AORTIC DISSECTION REPAIR WITH GRAFT- HEMASHIELD PLATINUM WOVEN DOUBLE VELOR 4 SIDE ARM VASCULAR GRAFT, D:35 X 12 X 10 X 10MM/ L:50CM;  Surgeon: Jose Winslow MD;  Location:  OR     EP ABLATION AV NODE N/A 01/11/2019    Procedure: EP Ablation AV Node;  Surgeon: Tsering Davey MD;  Location:  HE[facility] CATH LAB     EP PACEMAKER Left 01/11/2019    Procedure: EP Pacemaker;  Surgeon: Tsering Davey MD;  Location:  HE[facility] CATH LAB     THYROID SURGERY      benign mass removed     TRACHEOSTOMY N/A 01/25/2019    Procedure: TRACHEOSTOMY  (DR MCCLELLAND);  Surgeon: Bryson Mcclelland MD;  Location:  OR     Family History   Problem  Relation Age of Onset     Diabetes No family hx of      Myocardial Infarction No family hx of      Cerebrovascular Disease No family hx of      Coronary Artery Disease Early Onset No family hx of      Breast Cancer No family hx of      Colon Cancer No family hx of      Ovarian Cancer No family hx of             Allergies   Amoxicillin and Ciprofloxacin        [facility]  Pager: [phone]

## 2020-12-29 DIAGNOSIS — I50.32 CHRONIC DIASTOLIC CONGESTIVE HEART FAILURE (H): ICD-10-CM

## 2020-12-29 LAB
ANION GAP SERPL CALCULATED.3IONS-SCNC: 4 MMOL/L (ref 3–14)
BUN SERPL-MCNC: 31 MG/DL (ref 7–30)
CALCIUM SERPL-MCNC: 9.1 MG/DL (ref 8.5–10.1)
CHLORIDE SERPL-SCNC: 104 MMOL/L (ref 94–109)
CO2 SERPL-SCNC: 32 MMOL/L (ref 20–32)
CREAT SERPL-MCNC: 1.09 MG/DL (ref 0.52–1.04)
GFR SERPL CREATININE-BSD FRML MDRD: 48 ML/MIN/{1.73_M2}
GLUCOSE SERPL-MCNC: 80 MG/DL (ref 70–99)
POTASSIUM SERPL-SCNC: 4 MMOL/L (ref 3.4–5.3)
SODIUM SERPL-SCNC: 140 MMOL/L (ref 133–144)

## 2020-12-29 PROCEDURE — 36415 COLL VENOUS BLD VENIPUNCTURE: CPT | Performed by: NURSE PRACTITIONER

## 2020-12-29 PROCEDURE — 80048 BASIC METABOLIC PNL TOTAL CA: CPT | Performed by: NURSE PRACTITIONER

## 2021-01-06 ENCOUNTER — ANTICOAGULATION THERAPY VISIT (OUTPATIENT)
Dept: ANTICOAGULATION | Facility: CLINIC | Age: 81
End: 2021-01-06

## 2021-01-06 DIAGNOSIS — Z79.01 CURRENT USE OF LONG TERM ANTICOAGULATION: ICD-10-CM

## 2021-01-06 DIAGNOSIS — I48.20 CHRONIC ATRIAL FIBRILLATION (H): ICD-10-CM

## 2021-01-06 LAB
CAPILLARY BLOOD COLLECTION: NORMAL
INR PPP: 3.1 (ref 0.86–1.14)

## 2021-01-06 PROCEDURE — 85610 PROTHROMBIN TIME: CPT | Performed by: INTERNAL MEDICINE

## 2021-01-06 PROCEDURE — 36416 COLLJ CAPILLARY BLOOD SPEC: CPT | Performed by: INTERNAL MEDICINE

## 2021-01-06 NOTE — PROGRESS NOTES
ANTICOAGULATION MANAGEMENT     Patient Name:  Priya Funez  Date:  1/6/2021    ASSESSMENT /SUBJECTIVE:    Today's INR result of 3.1 is supratherapeutic. Goal INR of 2.0-3.0      Warfarin dose taken: Warfarin taken as instructed    Diet: No new diet changes affecting INR    Medication changes/ interactions: No new medications/supplements affecting INR    Previous INR: Therapeutic     S/S of bleeding or thromboembolism: No    New injury or illness: No    Upcoming surgery, procedure or cardioversion: No    Additional findings: transferring care to Dr. Burks from [facility]. Patient has been warfarin.       PLAN:    Telephone call with Priya regarding INR result and instructed:     Warfarin Dosing Instructions: 4mg then continue your current warfarin dose of 6mg MWF and 4mg AOD    Instructed patient to follow up no later than: 2 weeks  Lab visit scheduled    Education provided: Target INR goal and significance of current INR result      Priya verbalizes understanding and agrees to warfarin dosing plan.    Instructed to call the Anticoagulation Clinic for any changes, questions or concerns. (#[phone])        Kay Campbell RN      OBJECTIVE:  Recent labs: (last 7 days)     01/06/21  1219   INR 3.10*         INR assessment SUPRA    Recheck INR In: 2 WEEKS    INR Location Clinic      Anticoagulation Summary  As of 1/6/2021    INR goal:  2.0-3.0   TTR:  --   INR used for dosing:  No new INR was available at the time of this encounter.   Warfarin maintenance plan:  6 mg (2 mg x 3) every Mon, Wed, Fri; 4 mg (2 mg x 2) all other days   Full warfarin instructions:  1/6: 4 mg; Otherwise 6 mg every Mon, Wed, Fri; 4 mg all other days   Weekly warfarin total:  34 mg   Plan last modified:  Kay Campbell RN (1/6/2021)   Next INR check:  1/20/2021   Priority:  High   Target end date:  12/17/2021    Indications    Chronic atrial fibrillation (H) [I48.20]  Current use of long term anticoagulation  [Z79.01]             Anticoagulation Episode Summary     INR check location:      Preferred lab:      Send INR reminders to:  [facility]    Comments:        Anticoagulation Care Providers     Provider Role Specialty Phone number    Sharmaine Burks MD Referring Internal Medicine [phone]       Anticoagulation Management    Unable to reach Priya today.    Today's INR result of 3.1 is supratherapeutic (goal INR of 2.0-3.0).  Result received from: Clinic Lab    Follow up required to confirm warfarin dose taken and assess for changes    No instructions provided. Unable to leave voicemail.  No  available       Anticoagulation clinic to follow up    Kay Campbell RN

## 2021-01-06 NOTE — PROGRESS NOTES
Patient left a vm returning a call.  Please call back.  Thank you.  Gris Clark, RN  Anticoagulation Nurse - [facility]

## 2021-01-20 ENCOUNTER — ANTICOAGULATION THERAPY VISIT (OUTPATIENT)
Dept: ANTICOAGULATION | Facility: CLINIC | Age: 81
End: 2021-01-20

## 2021-01-20 DIAGNOSIS — Z79.01 CURRENT USE OF LONG TERM ANTICOAGULATION: ICD-10-CM

## 2021-01-20 DIAGNOSIS — I48.20 CHRONIC ATRIAL FIBRILLATION (H): ICD-10-CM

## 2021-01-20 LAB
CAPILLARY BLOOD COLLECTION: NORMAL
INR PPP: 3.9 (ref 0.86–1.14)

## 2021-01-20 PROCEDURE — 85610 PROTHROMBIN TIME: CPT | Performed by: INTERNAL MEDICINE

## 2021-01-20 PROCEDURE — 36416 COLLJ CAPILLARY BLOOD SPEC: CPT | Performed by: INTERNAL MEDICINE

## 2021-01-20 NOTE — PROGRESS NOTES
ANTICOAGULATION MANAGEMENT     Patient Name:  Priya Funez  Date:  1/20/2021    ASSESSMENT /SUBJECTIVE:    Today's INR result of 3.9 is supratherapeutic. Goal INR of 2.0-3.0      Warfarin dose taken: Warfarin taken as instructed    Diet: No new diet changes affecting INR    Medication changes/ interactions: Interaction between Demadex dose increased 12/21 dose increased on 12/21 and warfarin may be affecting INR    Previous INR: Supratherapeutic     S/S of bleeding or thromboembolism: No    New injury or illness: No    Upcoming surgery, procedure or cardioversion: No    Additional findings: None      PLAN:    Telephone call with Priya regarding INR result and instructed:     Warfarin Dosing Instructions: hold then change your warfarin dose to 6mg M/F and 4mg AOD  . (6 % change)    Instructed patient to follow up no later than: 2 weeks  Lab visit scheduled    Education provided: Target INR goal and significance of current INR result and Potential interaction between warfarin and Demadex      Priya verbalizes understanding and agrees to warfarin dosing plan.    Instructed to call the Anticoagulation Clinic for any changes, questions or concerns. (#[phone])        Kay Campbell RN      OBJECTIVE:  Recent labs: (last 7 days)     01/20/21  1508   INR 3.90*         No question data found.  Anticoagulation Summary  As of 1/20/2021    INR goal:  2.0-3.0   TTR:  0.0 % (2 wk)   INR used for dosing:  No new INR was available at the time of this encounter.   Warfarin maintenance plan:  6 mg (2 mg x 3) every Mon, Fri; 4 mg (2 mg x 2) all other days   Full warfarin instructions:  1/20: Hold; Otherwise 6 mg every Mon, Fri; 4 mg all other days   Weekly warfarin total:  32 mg   Plan last modified:  Kay Campbell RN (1/20/2021)   Next INR check:  2/3/2021   Priority:  High   Target end date:  12/17/2021    Indications    Chronic atrial fibrillation (H) [I48.20]  Current use of long term anticoagulation  [Z79.01]             Anticoagulation Episode Summary     INR check location:      Preferred lab:      Send INR reminders to:  [facility]    Comments:        Anticoagulation Care Providers     Provider Role Specialty Phone number    Sharmaine Burks MD Referring Internal Medicine [phone]

## 2021-02-03 ENCOUNTER — ANTICOAGULATION THERAPY VISIT (OUTPATIENT)
Dept: ANTICOAGULATION | Facility: CLINIC | Age: 81
End: 2021-02-03

## 2021-02-03 DIAGNOSIS — I48.20 CHRONIC ATRIAL FIBRILLATION (H): ICD-10-CM

## 2021-02-03 DIAGNOSIS — Z79.01 CURRENT USE OF LONG TERM ANTICOAGULATION: ICD-10-CM

## 2021-02-03 LAB — INR PPP: 2.4 (ref 0.86–1.14)

## 2021-02-03 PROCEDURE — 85610 PROTHROMBIN TIME: CPT | Performed by: INTERNAL MEDICINE

## 2021-02-03 PROCEDURE — 36416 COLLJ CAPILLARY BLOOD SPEC: CPT | Performed by: INTERNAL MEDICINE

## 2021-02-03 NOTE — PROGRESS NOTES
ANTICOAGULATION MANAGEMENT     Patient Name:  Priya Funez  Date:  2/3/2021    ASSESSMENT /SUBJECTIVE:    Today's INR result of 2.4 is therapeutic. Goal INR of 2.0-3.0      Warfarin dose taken: Less warfarin taken than planned which may be affecting INR    Diet: No new diet changes affecting INR    Medication changes/ interactions: No new medications/supplements affecting INR    Previous INR: Supratherapeutic     S/S of bleeding or thromboembolism: No    New injury or illness: No    Upcoming surgery, procedure or cardioversion: No    Additional findings: None      PLAN:    Telephone call with Priya regarding INR result and instructed:     Warfarin Dosing Instructions: continue taking 6mg Mon and 4 mg AOD, patient took this dosing for the past 2 weeks, will keep it the same since INR is in range today.     Instructed patient to follow up no later than: 2 weeks  Lab visit scheduled    Education provided: None required      Priya verbalizes understanding and agrees to warfarin dosing plan.    Instructed to call the Anticoagulation Clinic for any changes, questions or concerns. (#[phone])        Kay Campbell RN      OBJECTIVE:  Recent labs: (last 7 days)     02/03/21  1454   INR 2.40*         INR assessment THER    Recheck INR In: 2 WEEKS    INR Location Clinic      Anticoagulation Summary  As of 2/3/2021    INR goal:  2.0-3.0   TTR:  19.9 % (4 wk)   INR used for dosing:  No new INR was available at the time of this encounter.   Warfarin maintenance plan:  6 mg (2 mg x 3) every Mon; 4 mg (2 mg x 2) all other days   Full warfarin instructions:  6 mg every Mon; 4 mg all other days   Weekly warfarin total:  30 mg   Plan last modified:  Kay Campbell RN (2/3/2021)   Next INR check:  2/17/2021   Priority:  Maintenance   Target end date:  12/17/2021    Indications    Chronic atrial fibrillation (H) [I48.20]  Current use of long term anticoagulation [Z79.01]             Anticoagulation Episode Summary      INR check location:      Preferred lab:      [facility]    Comments:        Anticoagulation Care Providers     Provider Role Specialty Phone number    Sharmaine Burks MD Referring Internal Medicine [phone]

## 2021-02-17 ENCOUNTER — ANTICOAGULATION THERAPY VISIT (OUTPATIENT)
Dept: NURSING | Facility: CLINIC | Age: 81
End: 2021-02-17
Payer: COMMERCIAL

## 2021-02-17 DIAGNOSIS — I48.20 CHRONIC ATRIAL FIBRILLATION (H): ICD-10-CM

## 2021-02-17 LAB
CAPILLARY BLOOD COLLECTION: NORMAL
INR PPP: 2.5 (ref 0.86–1.14)

## 2021-02-17 PROCEDURE — 36416 COLLJ CAPILLARY BLOOD SPEC: CPT | Performed by: INTERNAL MEDICINE

## 2021-02-17 PROCEDURE — 85610 PROTHROMBIN TIME: CPT | Performed by: INTERNAL MEDICINE

## 2021-02-17 PROCEDURE — 99207 PR NO CHARGE NURSE ONLY: CPT

## 2021-02-17 NOTE — PROGRESS NOTES
ANTICOAGULATION FOLLOW-UP CLINIC VISIT    Patient Name:  Priya Funez  Date:  2021  Contact Type:  Telephone/ Patient    SUBJECTIVE:  Patient Findings     Comments:  The patient was assessed for diet, medication, and activity level changes, missed or extra doses, bruising or bleeding, with no problem findings.        Clinical Outcomes     Negatives:  Major bleeding event, Thromboembolic event, Anticoagulation-related hospital admission, Anticoagulation-related ED visit, Anticoagulation-related fatality    Comments:  The patient was assessed for diet, medication, and activity level changes, missed or extra doses, bruising or bleeding, with no problem findings.           OBJECTIVE    Recent labs: (last 7 days)     21  1533   INR 2.50*       ASSESSMENT / PLAN  INR assessment THER    Recheck INR In: 3 WEEKS    INR Location Outside lab      Anticoagulation Summary  As of 2021    INR goal:  2.0-3.0   TTR:  46.6 % (1.4 mo)   INR used for dosin.50 (2021)   Warfarin maintenance plan:  6 mg (2 mg x 3) every Mon; 4 mg (2 mg x 2) all other days   Full warfarin instructions:  6 mg every Mon; 4 mg all other days   Weekly warfarin total:  30 mg   No change documented:  Susie Orosco RN   Plan last modified:  Kay Campbell RN (2/3/2021)   Next INR check:  3/11/2021   Priority:  Maintenance   Target end date:  2021    Indications    Chronic atrial fibrillation (H) [I48.20]  Current use of long term anticoagulation [Z79.01]             Anticoagulation Episode Summary     INR check location:      Preferred lab:      Send INR reminders to:  [facility]    Comments:        Anticoagulation Care Providers     Provider Role Specialty Phone number    Sharmaine Burks MD Referring Internal Medicine [phone]            See the Encounter Report to view Anticoagulation Flowsheet and Dosing Calendar (Go to Encounters tab in chart review, and find the Anticoagulation Therapy  Visit)    Pt INR is 2.5 today. Pt advised to continue taking 6 mg on Mondays and 4 mg all the other days. Recheck the INR in 3 weeks or sooner if she has a change in health. Pt scheduled for the next INR on 3/11/21 at [facility]. Priya aware if signs of clotting (pain, tenderness, swelling, color change in leg or arm, SOB) and bleeding occur (blood in stool, urine, large bruising, bleeding gums, nosebleeds) to have INR check sooner. If sx severe report to ER or concerned for stroke call 911. If general questions or concerns arise, call clinic.         Susie Orosco RN

## 2021-02-24 ENCOUNTER — ANCILLARY PROCEDURE (OUTPATIENT)
Dept: CARDIOLOGY | Facility: CLINIC | Age: 81
End: 2021-02-24
Attending: INTERNAL MEDICINE
Payer: COMMERCIAL

## 2021-02-24 DIAGNOSIS — Z95.0 CARDIAC PACEMAKER IN SITU: ICD-10-CM

## 2021-02-24 LAB
MDC_IDC_LEAD_IMPLANT_DT: NORMAL
MDC_IDC_LEAD_LOCATION: NORMAL
MDC_IDC_LEAD_LOCATION_DETAIL_1: NORMAL
MDC_IDC_LEAD_MFG: NORMAL
MDC_IDC_LEAD_MODEL: NORMAL
MDC_IDC_LEAD_POLARITY_TYPE: NORMAL
MDC_IDC_LEAD_SERIAL: NORMAL
MDC_IDC_MSMT_BATTERY_DTM: NORMAL
MDC_IDC_MSMT_BATTERY_REMAINING_LONGEVITY: 135 MO
MDC_IDC_MSMT_BATTERY_RRT_TRIGGER: 2.62
MDC_IDC_MSMT_BATTERY_STATUS: NORMAL
MDC_IDC_MSMT_BATTERY_VOLTAGE: 2.97 V
MDC_IDC_MSMT_LEADCHNL_RV_IMPEDANCE_VALUE: 399 OHM
MDC_IDC_MSMT_LEADCHNL_RV_IMPEDANCE_VALUE: 437 OHM
MDC_IDC_MSMT_LEADCHNL_RV_PACING_THRESHOLD_AMPLITUDE: 0.62 V
MDC_IDC_MSMT_LEADCHNL_RV_PACING_THRESHOLD_PULSEWIDTH: 0.4 MS
MDC_IDC_MSMT_LEADCHNL_RV_SENSING_INTR_AMPL: 7.12 MV
MDC_IDC_MSMT_LEADCHNL_RV_SENSING_INTR_AMPL: 7.12 MV
MDC_IDC_PG_IMPLANT_DTM: NORMAL
MDC_IDC_PG_MFG: NORMAL
MDC_IDC_PG_MODEL: NORMAL
MDC_IDC_PG_SERIAL: NORMAL
MDC_IDC_PG_TYPE: NORMAL
MDC_IDC_SESS_CLINIC_NAME: NORMAL
MDC_IDC_SESS_DTM: NORMAL
MDC_IDC_SESS_TYPE: NORMAL
MDC_IDC_SET_BRADY_HYSTRATE: NORMAL
MDC_IDC_SET_BRADY_LOWRATE: 60 {BEATS}/MIN
MDC_IDC_SET_BRADY_MAX_SENSOR_RATE: 120 {BEATS}/MIN
MDC_IDC_SET_BRADY_MODE: NORMAL
MDC_IDC_SET_LEADCHNL_RV_PACING_AMPLITUDE: 2 V
MDC_IDC_SET_LEADCHNL_RV_PACING_ANODE_ELECTRODE_1: NORMAL
MDC_IDC_SET_LEADCHNL_RV_PACING_ANODE_LOCATION_1: NORMAL
MDC_IDC_SET_LEADCHNL_RV_PACING_CAPTURE_MODE: NORMAL
MDC_IDC_SET_LEADCHNL_RV_PACING_CATHODE_ELECTRODE_1: NORMAL
MDC_IDC_SET_LEADCHNL_RV_PACING_CATHODE_LOCATION_1: NORMAL
MDC_IDC_SET_LEADCHNL_RV_PACING_POLARITY: NORMAL
MDC_IDC_SET_LEADCHNL_RV_PACING_PULSEWIDTH: 0.4 MS
MDC_IDC_SET_LEADCHNL_RV_SENSING_ANODE_ELECTRODE_1: NORMAL
MDC_IDC_SET_LEADCHNL_RV_SENSING_ANODE_LOCATION_1: NORMAL
MDC_IDC_SET_LEADCHNL_RV_SENSING_CATHODE_ELECTRODE_1: NORMAL
MDC_IDC_SET_LEADCHNL_RV_SENSING_CATHODE_LOCATION_1: NORMAL
MDC_IDC_SET_LEADCHNL_RV_SENSING_POLARITY: NORMAL
MDC_IDC_SET_LEADCHNL_RV_SENSING_SENSITIVITY: 0.9 MV
MDC_IDC_SET_ZONE_DETECTION_INTERVAL: 360 MS
MDC_IDC_SET_ZONE_TYPE: NORMAL
MDC_IDC_STAT_BRADY_DTM_END: NORMAL
MDC_IDC_STAT_BRADY_DTM_START: NORMAL
MDC_IDC_STAT_BRADY_RV_PERCENT_PACED: 99.99 %
MDC_IDC_STAT_EPISODE_RECENT_COUNT: 0
MDC_IDC_STAT_EPISODE_RECENT_COUNT: 0
MDC_IDC_STAT_EPISODE_RECENT_COUNT_DTM_END: NORMAL
MDC_IDC_STAT_EPISODE_RECENT_COUNT_DTM_END: NORMAL
MDC_IDC_STAT_EPISODE_RECENT_COUNT_DTM_START: NORMAL
MDC_IDC_STAT_EPISODE_RECENT_COUNT_DTM_START: NORMAL
MDC_IDC_STAT_EPISODE_TOTAL_COUNT: 0
MDC_IDC_STAT_EPISODE_TOTAL_COUNT: 0
MDC_IDC_STAT_EPISODE_TOTAL_COUNT_DTM_END: NORMAL
MDC_IDC_STAT_EPISODE_TOTAL_COUNT_DTM_END: NORMAL
MDC_IDC_STAT_EPISODE_TOTAL_COUNT_DTM_START: NORMAL
MDC_IDC_STAT_EPISODE_TOTAL_COUNT_DTM_START: NORMAL
MDC_IDC_STAT_EPISODE_TYPE: NORMAL

## 2021-02-24 PROCEDURE — 93296 REM INTERROG EVL PM/IDS: CPT | Performed by: INTERNAL MEDICINE

## 2021-02-24 PROCEDURE — 93294 REM INTERROG EVL PM/LDLS PM: CPT | Performed by: INTERNAL MEDICINE

## 2021-03-11 DIAGNOSIS — I48.20 CHRONIC ATRIAL FIBRILLATION (H): ICD-10-CM

## 2021-03-11 LAB
CAPILLARY BLOOD COLLECTION: NORMAL
INR PPP: 2.6 (ref 0.86–1.14)

## 2021-03-11 PROCEDURE — 85610 PROTHROMBIN TIME: CPT | Performed by: INTERNAL MEDICINE

## 2021-03-11 PROCEDURE — 36416 COLLJ CAPILLARY BLOOD SPEC: CPT | Performed by: INTERNAL MEDICINE

## 2021-03-14 ENCOUNTER — HEALTH MAINTENANCE LETTER (OUTPATIENT)
Age: 81
End: 2021-03-14

## 2021-03-17 DIAGNOSIS — I50.32 CHRONIC DIASTOLIC CONGESTIVE HEART FAILURE (H): ICD-10-CM

## 2021-03-17 DIAGNOSIS — I48.19 PERSISTENT ATRIAL FIBRILLATION (H): ICD-10-CM

## 2021-03-17 DIAGNOSIS — I10 BENIGN ESSENTIAL HYPERTENSION: ICD-10-CM

## 2021-03-17 RX ORDER — METOPROLOL TARTRATE 25 MG/1
25 TABLET, FILM COATED ORAL 2 TIMES DAILY
Qty: 180 TABLET | Refills: 0 | Status: SHIPPED | OUTPATIENT
Start: 2021-03-17 | End: 2021-06-15

## 2021-03-22 ENCOUNTER — TELEPHONE (OUTPATIENT)
Dept: INTERNAL MEDICINE | Facility: CLINIC | Age: 81
End: 2021-03-22

## 2021-03-22 DIAGNOSIS — G47.9 TROUBLE IN SLEEPING: Primary | ICD-10-CM

## 2021-03-22 DIAGNOSIS — F41.1 GAD (GENERALIZED ANXIETY DISORDER): ICD-10-CM

## 2021-03-22 DIAGNOSIS — I48.20 CHRONIC ATRIAL FIBRILLATION (H): Primary | ICD-10-CM

## 2021-03-22 DIAGNOSIS — M81.0 AGE-RELATED OSTEOPOROSIS WITHOUT CURRENT PATHOLOGICAL FRACTURE: Primary | ICD-10-CM

## 2021-03-22 RX ORDER — ALENDRONATE SODIUM 70 MG/1
70 TABLET ORAL
Qty: 12 TABLET | Refills: 3 | Status: SHIPPED | OUTPATIENT
Start: 2021-03-22 | End: 2021-08-31

## 2021-03-22 RX ORDER — WARFARIN SODIUM 2 MG/1
4-6 TABLET ORAL DAILY
Qty: 200 TABLET | Refills: 1 | Status: SHIPPED | OUTPATIENT
Start: 2021-03-22 | End: 2021-09-30

## 2021-03-22 RX ORDER — ESCITALOPRAM OXALATE 10 MG/1
10 TABLET ORAL DAILY
Qty: 90 TABLET | Refills: 3 | Status: SHIPPED | OUTPATIENT
Start: 2021-03-22 | End: 2021-08-31

## 2021-03-22 RX ORDER — MIRTAZAPINE 15 MG/1
15 TABLET, FILM COATED ORAL AT BEDTIME
Qty: 90 TABLET | Refills: 3 | Status: SHIPPED | OUTPATIENT
Start: 2021-03-22 | End: 2021-08-31

## 2021-03-22 NOTE — TELEPHONE ENCOUNTER
Is patient suppose to be taking this it looks like it ended on 07/17/2020. Also does not look to have ever been prescribed by us.

## 2021-03-23 DIAGNOSIS — I50.32 CHRONIC DIASTOLIC CONGESTIVE HEART FAILURE (H): ICD-10-CM

## 2021-03-23 LAB
ANION GAP SERPL CALCULATED.3IONS-SCNC: 4 MMOL/L (ref 3–14)
BUN SERPL-MCNC: 29 MG/DL (ref 7–30)
CALCIUM SERPL-MCNC: 9.2 MG/DL (ref 8.5–10.1)
CHLORIDE SERPL-SCNC: 105 MMOL/L (ref 94–109)
CO2 SERPL-SCNC: 31 MMOL/L (ref 20–32)
CREAT SERPL-MCNC: 1.06 MG/DL (ref 0.52–1.04)
GFR SERPL CREATININE-BSD FRML MDRD: 50 ML/MIN/{1.73_M2}
GLUCOSE SERPL-MCNC: 76 MG/DL (ref 70–99)
POTASSIUM SERPL-SCNC: 4.1 MMOL/L (ref 3.4–5.3)
SODIUM SERPL-SCNC: 140 MMOL/L (ref 133–144)

## 2021-03-23 PROCEDURE — 36415 COLL VENOUS BLD VENIPUNCTURE: CPT | Performed by: NURSE PRACTITIONER

## 2021-03-23 PROCEDURE — 80048 BASIC METABOLIC PNL TOTAL CA: CPT | Performed by: NURSE PRACTITIONER

## 2021-03-25 ENCOUNTER — OFFICE VISIT (OUTPATIENT)
Dept: CARDIOLOGY | Facility: CLINIC | Age: 81
End: 2021-03-25
Attending: NURSE PRACTITIONER
Payer: COMMERCIAL

## 2021-03-25 VITALS
HEART RATE: 87 BPM | BODY MASS INDEX: 27.31 KG/M2 | SYSTOLIC BLOOD PRESSURE: 125 MMHG | HEIGHT: 64 IN | WEIGHT: 160 LBS | DIASTOLIC BLOOD PRESSURE: 84 MMHG

## 2021-03-25 DIAGNOSIS — N18.31 STAGE 3A CHRONIC KIDNEY DISEASE (H): ICD-10-CM

## 2021-03-25 DIAGNOSIS — I50.32 CHRONIC DIASTOLIC CONGESTIVE HEART FAILURE (H): ICD-10-CM

## 2021-03-25 DIAGNOSIS — I48.20 CHRONIC ATRIAL FIBRILLATION (H): Primary | ICD-10-CM

## 2021-03-25 DIAGNOSIS — I10 BENIGN ESSENTIAL HYPERTENSION: ICD-10-CM

## 2021-03-25 DIAGNOSIS — Z98.890 S/P AORTIC DISSECTION REPAIR: ICD-10-CM

## 2021-03-25 PROCEDURE — 99214 OFFICE O/P EST MOD 30 MIN: CPT | Performed by: INTERNAL MEDICINE

## 2021-03-25 ASSESSMENT — MIFFLIN-ST. JEOR: SCORE: 1182.82

## 2021-03-25 NOTE — PATIENT INSTRUCTIONS
1.  Echo and labs in 3 months.  2.  Follow up in 3 months with Xochitl.    Have a great day!  Becky Kwok MD on 3/25/2021 at 1:22 PM

## 2021-03-25 NOTE — PROGRESS NOTES
[facility] Progress note    Assessment  1.  Chronic diastolic heart failure/HFpEF :  LVEF 60-65%, LV normal size/function, RV mild to moderately dilated, RV systolic function mildly decreased. She appears compensated and euvolemic on exam. Improved HF symptoms on increased torsemide, plan to continue torsemide 20mg daily.               - NYHA class III, stage C              - Fluid status : euvolemic today              - Diuretic regimen : torsemide to 20mg daily              - Aldosterone antagonist : could consider in future.               - Blood pressure : controlled              - Once again reinforced HF education provided today, reviewed low sodium diet and when to notify [facility].    2. Chronic atrial fibrillation s/p AV node ablation 1/2019 - stable, asymptomatic. Most recent device interrogation was stable.                - Continue routine follow up with EP and device clinic              - [id number] score 3 (age, female, HF)              - Continue warfarin for thromboembolic prophylaxis  3. Emergent aortic dissection repair 1/4/19              - Repeat CT 6 months after surgery for surveillance completed 8/7/2019 which was stable, showing similar appearance as post repair.   4. Hypertension - controlled, continue current medications  5. Obesity   6. Mild mitral regurgitation  7. Moderate tricuspid regurgitation    Plan  Follow up in 3 months with Xochitl Eden PA-C  Echo and labs (CBC, BMP, Lipids, ALT) prior to visit   She would like to discuss transition to Xarelto at that visit. Her daughter is a drug rep for Xarelto, so she would like to confer with her daughter.       HPI:     Priya Funez is a very pleasant 80 year old year old female with a history of HFpEF, atrial fibrillation s/p AV leo ablation with PPM implantation 1/11/2019, emergent aortic dissection repair 1/4/19, hypertension and obesity.     Last echo 6/26/19 with normal EF, moderate TR.     She notes she is very  salt-sensitive.  She enjoys salty foods from time-to-time and we reviewed importance of low na diet today. Overall she is stable without new concerns. She has chronic dyspnea. No chest pain. Edema controlled. She has chronic fatigue. She is tolerating her medications.         Encounter Diagnosis   Name Primary?     Chronic diastolic congestive heart failure (H)        CURRENT MEDICATIONS:  Current Outpatient Medications   Medication Sig Dispense Refill     Acetaminophen 325 MG CAPS Take 325-650 mg by mouth every 4 hours as needed       alendronate (FOSAMAX) 70 MG tablet Take 1 tablet (70 mg) by mouth every 7 days 12 tablet 3     aspirin (ASA) 81 MG EC tablet Take 81 mg by mouth every 24 hours       cholecalciferol (VITAMIN D-1000 MAX ST) 1000 units TABS Take 1,000 Units by mouth daily        cyanocobalamin (VITAMIN B-12) 100 MCG tablet Take 1,000 mcg by mouth daily        escitalopram (LEXAPRO) 10 MG tablet Take 1 tablet (10 mg) by mouth daily 90 tablet 3     metoprolol tartrate (LOPRESSOR) 25 MG tablet Take 1 tablet (25 mg) by mouth 2 times daily 180 tablet 0     mirtazapine (REMERON) 15 MG tablet Take 1 tablet (15 mg) by mouth At Bedtime 90 tablet 3     torsemide (DEMADEX) 20 MG tablet Take 1 tablet (20 mg) by mouth daily 90 tablet 3     warfarin ANTICOAGULANT (COUMADIN) 2 MG tablet Take 2-3 tablets (4-6 mg) by mouth daily Take 3 tablets (6 mg) every Mon; Take 2 tablets (4mg) all other days. Adjust dose per INR result as directed 200 tablet 1       ALLERGIES     Allergies   Allergen Reactions     Amoxicillin Swelling     Face and body     Ciprofloxacin Hives       PAST MEDICAL, SURGICAL, FAMILY, SOCIAL HISTORY:  History was reviewed and updated as needed, see medical record.    REVIEW OF SYSTEMS:  Skin:  Negative     Eyes:  Positive for glasses  ENT:  Negative    Respiratory:  Positive for cough;shortness of breath;dyspnea on exertion;mucoid expectorant  Cardiovascular:    Positive for;fatigue  Gastroenterology:  Negative    Genitourinary:  Positive for urinary frequency  Musculoskeletal:  Positive for arthritis;joint pain  Neurologic:  Negative    Psychiatric:  Positive for anxiety;depression  Heme/Lymph/Imm:  Positive for allergies  Endocrine:  Negative      PHYSICAL EXAM:      BP: 125/84 Pulse: 87            Vital Signs with Ranges  Pulse:  [87] 87  BP: (125)/(84) 125/84  160 lbs 0 oz    Constitutional: awake, alert, no distress  Eyes: sclera nonicteric  ENT: trachea midline  Respiratory: clear bilaterally  Cardiovascular: regular, I/VI systolic murmur RLSB  GI: nondistended, nontender, bowel sounds present  Lymph/Hematologic: no lymphadenopathy  Skin: dry, no rash no edema  Musculoskeletal: grossly normal muscle bulk and tone  Neurologic: no focal deficits  Neuropsychiatric: appropriate affect    Last echo 6/26/19  The left ventricle is normal in size.  Left ventricular systolic function is normal.  The visual ejection fraction is estimated at 60-65%.  Normal left ventricular wall motion  The right ventricle is mild to moderately dilated.  Mildly decreased right ventricular systolic function  There is mild (1+) mitral regurgitation.  There is moderate (2+) tricuspid regurgitation.  IVC diameter and respiratory changes fall into an intermediate range  suggesting an RA pressure of 8 mmHg.  Right ventricular systolic pressure is elevated, consistent with mild  pulmonary hypertension.     Since the previous study, the RV now appears larger with mildly decreased  function. TR has increased.      Recent Lab Results:  LIPID RESULTS:  Lab Results   Component Value Date    TRIG 96 01/28/2019       LIVER ENZYME RESULTS:  Lab Results   Component Value Date    AST 27 04/04/2019    ALT 19 04/04/2019       CBC RESULTS:  Lab Results   Component Value Date    WBC 6.7 04/04/2019    RBC 3.70 (L) 04/04/2019    HGB 13.2 07/15/2020    HCT 31.7 (L) 04/04/2019    MCV 86 04/04/2019    MCH 24.6 (L) 04/04/2019    MCHC 28.7 (L) 04/04/2019    RDW  17.5 (H) 04/04/2019     04/04/2019       BMP RESULTS:  Lab Results   Component Value Date     03/23/2021    POTASSIUM 4.1 03/23/2021    CHLORIDE 105 03/23/2021    CO2 31 03/23/2021    ANIONGAP 4 03/23/2021    GLC 76 03/23/2021    BUN 29 03/23/2021    CR 1.06 (H) 03/23/2021    GFRESTIMATED 50 (L) 03/23/2021    GFRESTBLACK 58 (L) 03/23/2021    BESS 9.2 03/23/2021        A1C RESULTS:  Lab Results   Component Value Date    A1C 5.3 01/04/2019       INR RESULTS:  Lab Results   Component Value Date    INR 2.60 (H) 03/11/2021    INR 2.50 (H) 02/17/2021     Lab Results   Component Value Date    TRIG 96 01/28/2019             RASTA Eden APRN CNP  [address]  [place] [id number]

## 2021-03-25 NOTE — LETTER
3/25/2021    Sharmaine Burks MD  [address]  [facility] [address]    RE: Priya Funez       Dear Colleague,    I had the pleasure of seeing Priya Funez in the  Heart Care.    [facility] Progress note    Assessment  1.  Chronic diastolic heart failure/HFpEF :  LVEF 60-65%, LV normal size/function, RV mild to moderately dilated, RV systolic function mildly decreased. She appears compensated and euvolemic on exam. Improved HF symptoms on increased torsemide, plan to continue torsemide 20mg daily.               - NYHA class III, stage C              - Fluid status : euvolemic today              - Diuretic regimen : torsemide to 20mg daily              - Aldosterone antagonist : could consider in future.               - Blood pressure : controlled              - Once again reinforced HF education provided today, reviewed low sodium diet and when to notify [facility].    2. Chronic atrial fibrillation s/p AV node ablation 1/2019 - stable, asymptomatic. Most recent device interrogation was stable.                - Continue routine follow up with EP and device clinic              - [id number] score 3 (age, female, HF)              - Continue warfarin for thromboembolic prophylaxis  3. Emergent aortic dissection repair 1/4/19              - Repeat CT 6 months after surgery for surveillance completed 8/7/2019 which was stable, showing similar appearance as post repair.   4. Hypertension - controlled, continue current medications  5. Obesity   6. Mild mitral regurgitation  7. Moderate tricuspid regurgitation    Plan  Follow up in 3 months with Xochitl Eden PA-C  Echo and labs (CBC, BMP, Lipids, ALT) prior to visit   She would like to discuss transition to Xarelto at that visit. Her daughter is a drug rep for Xarelto, so she would like to confer with her daughter.       HPI:     Priya Funez is a very pleasant 80 year old year old female with a  history of HFpEF, atrial fibrillation s/p AV leo ablation with PPM implantation 1/11/2019, emergent aortic dissection repair 1/4/19, hypertension and obesity.     Last echo 6/26/19 with normal EF, moderate TR.     She notes she is very salt-sensitive.  She enjoys salty foods from time-to-time and we reviewed importance of low na diet today. Overall she is stable without new concerns. She has chronic dyspnea. No chest pain. Edema controlled. She has chronic fatigue. She is tolerating her medications.         Encounter Diagnosis   Name Primary?     Chronic diastolic congestive heart failure (H)        CURRENT MEDICATIONS:  Current Outpatient Medications   Medication Sig Dispense Refill     Acetaminophen 325 MG CAPS Take 325-650 mg by mouth every 4 hours as needed       alendronate (FOSAMAX) 70 MG tablet Take 1 tablet (70 mg) by mouth every 7 days 12 tablet 3     aspirin (ASA) 81 MG EC tablet Take 81 mg by mouth every 24 hours       cholecalciferol (VITAMIN D-1000 MAX ST) 1000 units TABS Take 1,000 Units by mouth daily        cyanocobalamin (VITAMIN B-12) 100 MCG tablet Take 1,000 mcg by mouth daily        escitalopram (LEXAPRO) 10 MG tablet Take 1 tablet (10 mg) by mouth daily 90 tablet 3     metoprolol tartrate (LOPRESSOR) 25 MG tablet Take 1 tablet (25 mg) by mouth 2 times daily 180 tablet 0     mirtazapine (REMERON) 15 MG tablet Take 1 tablet (15 mg) by mouth At Bedtime 90 tablet 3     torsemide (DEMADEX) 20 MG tablet Take 1 tablet (20 mg) by mouth daily 90 tablet 3     warfarin ANTICOAGULANT (COUMADIN) 2 MG tablet Take 2-3 tablets (4-6 mg) by mouth daily Take 3 tablets (6 mg) every Mon; Take 2 tablets (4mg) all other days. Adjust dose per INR result as directed 200 tablet 1       ALLERGIES     Allergies   Allergen Reactions     Amoxicillin Swelling     Face and body     Ciprofloxacin Hives       PAST MEDICAL, SURGICAL, FAMILY, SOCIAL HISTORY:  History was reviewed and updated as needed, see medical  record.    REVIEW OF SYSTEMS:  Skin:  Negative     Eyes:  Positive for glasses  ENT:  Negative    Respiratory:  Positive for cough;shortness of breath;dyspnea on exertion;mucoid expectorant  Cardiovascular:    Positive for;fatigue  Gastroenterology: Negative    Genitourinary:  Positive for urinary frequency  Musculoskeletal:  Positive for arthritis;joint pain  Neurologic:  Negative    Psychiatric:  Positive for anxiety;depression  Heme/Lymph/Imm:  Positive for allergies  Endocrine:  Negative      PHYSICAL EXAM:      BP: 125/84 Pulse: 87            Vital Signs with Ranges  Pulse:  [87] 87  BP: (125)/(84) 125/84  160 lbs 0 oz    Constitutional: awake, alert, no distress  Eyes: sclera nonicteric  ENT: trachea midline  Respiratory: clear bilaterally  Cardiovascular: regular, I/VI systolic murmur RLSB  GI: nondistended, nontender, bowel sounds present  Lymph/Hematologic: no lymphadenopathy  Skin: dry, no rash no edema  Musculoskeletal: grossly normal muscle bulk and tone  Neurologic: no focal deficits  Neuropsychiatric: appropriate affect    Last echo 6/26/19  The left ventricle is normal in size.  Left ventricular systolic function is normal.  The visual ejection fraction is estimated at 60-65%.  Normal left ventricular wall motion  The right ventricle is mild to moderately dilated.  Mildly decreased right ventricular systolic function  There is mild (1+) mitral regurgitation.  There is moderate (2+) tricuspid regurgitation.  IVC diameter and respiratory changes fall into an intermediate range  suggesting an RA pressure of 8 mmHg.  Right ventricular systolic pressure is elevated, consistent with mild  pulmonary hypertension.     Since the previous study, the RV now appears larger with mildly decreased  function. TR has increased.      Recent Lab Results:  LIPID RESULTS:  Lab Results   Component Value Date    TRIG 96 01/28/2019       LIVER ENZYME RESULTS:  Lab Results   Component Value Date    AST 27 04/04/2019    ALT 19  04/04/2019       CBC RESULTS:  Lab Results   Component Value Date    WBC 6.7 04/04/2019    RBC 3.70 (L) 04/04/2019    HGB 13.2 07/15/2020    HCT 31.7 (L) 04/04/2019    MCV 86 04/04/2019    MCH 24.6 (L) 04/04/2019    MCHC 28.7 (L) 04/04/2019    RDW 17.5 (H) 04/04/2019     04/04/2019       BMP RESULTS:  Lab Results   Component Value Date     03/23/2021    POTASSIUM 4.1 03/23/2021    CHLORIDE 105 03/23/2021    CO2 31 03/23/2021    ANIONGAP 4 03/23/2021    GLC 76 03/23/2021    BUN 29 03/23/2021    CR 1.06 (H) 03/23/2021    GFRESTIMATED 50 (L) 03/23/2021    GFRESTBLACK 58 (L) 03/23/2021    BESS 9.2 03/23/2021        A1C RESULTS:  Lab Results   Component Value Date    A1C 5.3 01/04/2019       INR RESULTS:  Lab Results   Component Value Date    INR 2.60 (H) 03/11/2021    INR 2.50 (H) 02/17/2021     Lab Results   Component Value Date    TRIG 96 01/28/2019       Thank you for allowing me to participate in the care of your patient.      Sincerely,     Becky Kwok MD     [facility]    cc:   Xochitl Eden, APRN CNP  [address]  [place] [address]

## 2021-04-08 ENCOUNTER — ANTICOAGULATION THERAPY VISIT (OUTPATIENT)
Dept: ANTICOAGULATION | Facility: CLINIC | Age: 81
End: 2021-04-08

## 2021-04-08 DIAGNOSIS — I48.20 CHRONIC ATRIAL FIBRILLATION (H): ICD-10-CM

## 2021-04-08 DIAGNOSIS — Z79.01 CURRENT USE OF LONG TERM ANTICOAGULATION: ICD-10-CM

## 2021-04-08 LAB
CAPILLARY BLOOD COLLECTION: NORMAL
INR PPP: 2.8 (ref 0.86–1.14)

## 2021-04-08 PROCEDURE — 36416 COLLJ CAPILLARY BLOOD SPEC: CPT | Performed by: INTERNAL MEDICINE

## 2021-04-08 PROCEDURE — 99207 PR NO CHARGE NURSE ONLY: CPT

## 2021-04-08 PROCEDURE — 85610 PROTHROMBIN TIME: CPT | Performed by: INTERNAL MEDICINE

## 2021-04-08 NOTE — PROGRESS NOTES
ANTICOAGULATION FOLLOW-UP CLINIC VISIT    Patient Name:  Priya Funez  Date:  2021  Contact Type:  Telephone    SUBJECTIVE:  Patient Findings     Comments:  The patient was assessed for diet, medication, and activity level changes, missed or extra doses, bruising or bleeding, with no problem findings.          Clinical Outcomes     Comments:  The patient was assessed for diet, medication, and activity level changes, missed or extra doses, bruising or bleeding, with no problem findings.             OBJECTIVE    Recent labs: (last 7 days)     21  1433   INR 2.80*       ASSESSMENT / PLAN  INR assessment THER    Recheck INR In: 6 WEEKS    INR Location Clinic      Anticoagulation Summary  As of 2021    INR goal:  2.0-3.0   TTR:  75.6 % (3.1 mo)   INR used for dosin.80 (2021)   Warfarin maintenance plan:  5 mg (2 mg x 2.5) every Mon, Fri; 4 mg (2 mg x 2) all other days   Full warfarin instructions:  5 mg every Mon, Fri; 4 mg all other days   Weekly warfarin total:  30 mg   Plan last modified:  Christine Adames RN (2021)   Next INR check:  2021   Priority:  Maintenance   Target end date:  2021    Indications    Chronic atrial fibrillation (H) [I48.20]  Current use of long term anticoagulation [Z79.01]             Anticoagulation Episode Summary     INR check location:      Preferred lab:      Send INR reminders to:  [facility]    Comments:        Anticoagulation Care Providers     Provider Role Specialty Phone number    Sharmaine Burks MD Referring Internal Medicine [phone]            See the Encounter Report to view Anticoagulation Flowsheet and Dosing Calendar (Go to Encounters tab in chart review, and find the Anticoagulation Therapy Visit)        Christine Adames RN

## 2021-05-06 ENCOUNTER — ANTICOAGULATION THERAPY VISIT (OUTPATIENT)
Dept: ANTICOAGULATION | Facility: CLINIC | Age: 81
End: 2021-05-06

## 2021-05-06 DIAGNOSIS — I48.20 CHRONIC ATRIAL FIBRILLATION (H): ICD-10-CM

## 2021-05-06 DIAGNOSIS — Z79.01 CURRENT USE OF LONG TERM ANTICOAGULATION: ICD-10-CM

## 2021-05-06 LAB
CAPILLARY BLOOD COLLECTION: NORMAL
INR PPP: 3.5 (ref 0.86–1.14)

## 2021-05-06 PROCEDURE — 85610 PROTHROMBIN TIME: CPT | Performed by: INTERNAL MEDICINE

## 2021-05-06 PROCEDURE — 36416 COLLJ CAPILLARY BLOOD SPEC: CPT | Performed by: INTERNAL MEDICINE

## 2021-05-06 PROCEDURE — 99207 PR NO CHARGE NURSE ONLY: CPT

## 2021-05-06 NOTE — PROGRESS NOTES
ANTICOAGULATION FOLLOW-UP CLINIC VISIT    Patient Name:  Priya Funez  Date:  5/6/2021  Contact Type:  Telephone    SUBJECTIVE:  Patient Findings     Comments:  Rt foot pain and swelling         Clinical Outcomes     Comments:  Rt foot pain and swelling            OBJECTIVE    Recent labs: (last 7 days)     05/06/21  1347   INR 3.50*       ASSESSMENT / PLAN  INR assessment SUPRA    Recheck INR In: 2 WEEKS    INR Location Clinic      Anticoagulation Summary  As of 5/6/2021    INR goal:  2.0-3.0   TTR:  64.6 % (4 mo)   INR used for dosing:  3.50 (5/6/2021)   Warfarin maintenance plan:  5 mg (2 mg x 2.5) every Mon, Fri; 4 mg (2 mg x 2) all other days   Full warfarin instructions:  5/6: 2 mg; Otherwise 5 mg every Mon, Fri; 4 mg all other days   Weekly warfarin total:  30 mg   Plan last modified:  Christine Adames RN (4/8/2021)   Next INR check:  5/20/2021   Priority:  Maintenance   Target end date:  12/17/2021    Indications    Chronic atrial fibrillation (H) [I48.20]  Current use of long term anticoagulation [Z79.01]             Anticoagulation Episode Summary     INR check location:      Preferred lab:      Send INR reminders to:  [facility]    Comments:        Anticoagulation Care Providers     Provider Role Specialty Phone number    Sharmaine Burks MD Referring Internal Medicine [phone]            See the Encounter Report to view Anticoagulation Flowsheet and Dosing Calendar (Go to Encounters tab in chart review, and find the Anticoagulation Therapy Visit)        Christine Adames RN

## 2021-05-20 ENCOUNTER — ANTICOAGULATION THERAPY VISIT (OUTPATIENT)
Dept: NURSING | Facility: CLINIC | Age: 81
End: 2021-05-20

## 2021-05-20 DIAGNOSIS — I48.20 CHRONIC ATRIAL FIBRILLATION (H): ICD-10-CM

## 2021-05-20 DIAGNOSIS — Z79.01 CURRENT USE OF LONG TERM ANTICOAGULATION: ICD-10-CM

## 2021-05-20 LAB
CAPILLARY BLOOD COLLECTION: NORMAL
INR PPP: 2.3 (ref 0.86–1.14)

## 2021-05-20 PROCEDURE — 85610 PROTHROMBIN TIME: CPT | Performed by: INTERNAL MEDICINE

## 2021-05-20 PROCEDURE — 36416 COLLJ CAPILLARY BLOOD SPEC: CPT | Performed by: INTERNAL MEDICINE

## 2021-05-20 NOTE — PROGRESS NOTES
Patient left  returning call.     Jessie Mancuso RN - [facility]

## 2021-05-20 NOTE — PROGRESS NOTES
Anticoagulation Management    Unable to reach Priya today.    Today's INR result of 2.3 is therapeutic (goal INR of 2.0-3.0).  Result received from: Clinic Lab    Follow up required to assess for changes     Left a voicemail advising pt to continue taking 5 mg on Mon/Fri and 4 mg all other days. If all is well, then would recommend an INR recheck in 3 weeks. Requested a call back to discuss.    Transfer to [phone]      Anticoagulation clinic to follow up    Susie Orosco RN  ANTICOAGULATION FOLLOW-UP CLINIC VISIT    Patient Name:  Priya Funez  Date:  2021  Contact Type:  Telephone/ Patient    SUBJECTIVE:  Patient Findings     Comments:  The patient was assessed for diet, medication, and activity level changes, missed or extra doses, bruising or bleeding, with no problem findings.        Clinical Outcomes     Negatives:  Major bleeding event, Thromboembolic event, Anticoagulation-related hospital admission, Anticoagulation-related ED visit, Anticoagulation-related fatality    Comments:  The patient was assessed for diet, medication, and activity level changes, missed or extra doses, bruising or bleeding, with no problem findings.           OBJECTIVE    Recent labs: (last 7 days)     21  1425   INR 2.30*       ASSESSMENT / PLAN  INR assessment THER    Recheck INR In: 3 WEEKS    INR Location Outside lab      Anticoagulation Summary  As of 2021    INR goal:  2.0-3.0   TTR:  64.0 % (4.5 mo)   INR used for dosin.30 (2021)   Warfarin maintenance plan:  5 mg (2 mg x 2.5) every Mon, Fri; 4 mg (2 mg x 2) all other days   Full warfarin instructions:  5 mg every Mon, Fri; 4 mg all other days   Weekly warfarin total:  30 mg   No change documented:  Susie Orosco RN   Plan last modified:  Christine Adames RN (2021)   Next INR check:  6/10/2021   Priority:  Maintenance   Target end date:  2021    Indications    Chronic atrial fibrillation (H) [I48.20]  Current use of long  term anticoagulation [Z79.01]             Anticoagulation Episode Summary     INR check location:      Preferred lab:      Send INR reminders to:  [facility]    Comments:        Anticoagulation Care Providers     Provider Role Specialty Phone number    Sharmaine Burks MD Referring Internal Medicine [phone]            See the Encounter Report to view Anticoagulation Flowsheet and Dosing Calendar (Go to Encounters tab in chart review, and find the Anticoagulation Therapy Visit)    Pt INR is 2.3 today. Pt advised to continue taking 5 mg of warfarin on Mon/Fri and 4 mg all other days. Recheck the INR in 3 weeks or sooner if she has a change in health. Pt scheduled for the next INR check on 6/10/21 at [facility]. Priya aware if signs of clotting (pain, tenderness, swelling, color change in leg or arm, SOB) and bleeding occur (blood in stool, urine, large bruising, bleeding gums, nosebleeds) to have INR check sooner. If sx severe report to ER or concerned for stroke call 911. If general questions or concerns arise, call clinic.    Susie Orosco RN

## 2021-06-10 ENCOUNTER — ANTICOAGULATION THERAPY VISIT (OUTPATIENT)
Dept: ANTICOAGULATION | Facility: CLINIC | Age: 81
End: 2021-06-10

## 2021-06-10 DIAGNOSIS — Z79.01 CURRENT USE OF LONG TERM ANTICOAGULATION: ICD-10-CM

## 2021-06-10 DIAGNOSIS — I48.20 CHRONIC ATRIAL FIBRILLATION (H): ICD-10-CM

## 2021-06-10 LAB
CAPILLARY BLOOD COLLECTION: NORMAL
INR PPP: 2.4 (ref 0.86–1.14)

## 2021-06-10 PROCEDURE — 36416 COLLJ CAPILLARY BLOOD SPEC: CPT | Performed by: INTERNAL MEDICINE

## 2021-06-10 PROCEDURE — 85610 PROTHROMBIN TIME: CPT | Performed by: INTERNAL MEDICINE

## 2021-06-10 PROCEDURE — 99207 PR NO CHARGE NURSE ONLY: CPT

## 2021-06-11 ENCOUNTER — ANCILLARY PROCEDURE (OUTPATIENT)
Dept: CARDIOLOGY | Facility: CLINIC | Age: 81
End: 2021-06-11
Attending: INTERNAL MEDICINE
Payer: COMMERCIAL

## 2021-06-11 DIAGNOSIS — Z95.0 CARDIAC PACEMAKER IN SITU: ICD-10-CM

## 2021-06-11 PROCEDURE — 93296 REM INTERROG EVL PM/IDS: CPT | Performed by: INTERNAL MEDICINE

## 2021-06-11 PROCEDURE — 93294 REM INTERROG EVL PM/LDLS PM: CPT | Performed by: INTERNAL MEDICINE

## 2021-06-11 NOTE — PROGRESS NOTES
ANTICOAGULATION FOLLOW-UP CLINIC VISIT    Patient Name:  Priya Funez  Date:  2021  Contact Type:  Telephone    SUBJECTIVE:  Patient Findings     Comments:  The patient was assessed for diet, medication, and activity level changes, missed or extra doses, bruising or bleeding, with no problem findings.          Clinical Outcomes     Comments:  The patient was assessed for diet, medication, and activity level changes, missed or extra doses, bruising or bleeding, with no problem findings.             OBJECTIVE    Recent labs: (last 7 days)     06/10/21  1427   INR 2.40*       ASSESSMENT / PLAN  INR assessment THER    Recheck INR In: 4 WEEKS    INR Location Clinic      Anticoagulation Summary  As of 6/10/2021    INR goal:  2.0-3.0   TTR:  68.8 % (5.2 mo)   INR used for dosin.40 (6/10/2021)   Warfarin maintenance plan:  5 mg (2 mg x 2.5) every Mon, Fri; 4 mg (2 mg x 2) all other days   Full warfarin instructions:  5 mg every Mon, Fri; 4 mg all other days   Weekly warfarin total:  30 mg   No change documented:  Christine Adames RN   Plan last modified:  Christine Adames RN (2021)   Next INR check:  2021   Priority:  Maintenance   Target end date:  2021    Indications    Chronic atrial fibrillation (H) [I48.20]  Current use of long term anticoagulation [Z79.01]             Anticoagulation Episode Summary     INR check location:      Preferred lab:      Send INR reminders to:  [facility]    Comments:        Anticoagulation Care Providers     Provider Role Specialty Phone number    Sharmaine Burks MD Referring Internal Medicine [phone]            See the Encounter Report to view Anticoagulation Flowsheet and Dosing Calendar (Go to Encounters tab in chart review, and find the Anticoagulation Therapy Visit)        Christine Adames RN

## 2021-06-14 ENCOUNTER — HOSPITAL ENCOUNTER (OUTPATIENT)
Dept: CARDIOLOGY | Facility: CLINIC | Age: 81
Discharge: HOME | End: 2021-06-14
Attending: INTERNAL MEDICINE | Admitting: INTERNAL MEDICINE
Payer: COMMERCIAL

## 2021-06-14 DIAGNOSIS — I50.32 CHRONIC DIASTOLIC CONGESTIVE HEART FAILURE (H): ICD-10-CM

## 2021-06-14 LAB
ALT SERPL W P-5'-P-CCNC: 22 U/L (ref 0–50)
ANION GAP SERPL CALCULATED.3IONS-SCNC: 2 MMOL/L (ref 3–14)
BUN SERPL-MCNC: 29 MG/DL (ref 7–30)
CALCIUM SERPL-MCNC: 9 MG/DL (ref 8.5–10.1)
CHLORIDE SERPL-SCNC: 106 MMOL/L (ref 94–109)
CHOLEST SERPL-MCNC: 134 MG/DL
CO2 SERPL-SCNC: 30 MMOL/L (ref 20–32)
CREAT SERPL-MCNC: 1.27 MG/DL (ref 0.52–1.04)
ERYTHROCYTE [DISTWIDTH] IN BLOOD BY AUTOMATED COUNT: 13.5 % (ref 10–15)
GFR SERPL CREATININE-BSD FRML MDRD: 40 ML/MIN/{1.73_M2}
GLUCOSE SERPL-MCNC: 78 MG/DL (ref 70–99)
HCT VFR BLD AUTO: 40.1 % (ref 35–47)
HDLC SERPL-MCNC: 53 MG/DL
HGB BLD-MCNC: 12.8 G/DL (ref 11.7–15.7)
LDLC SERPL CALC-MCNC: 67 MG/DL
MCH RBC QN AUTO: 29.6 PG (ref 26.5–33)
MCHC RBC AUTO-ENTMCNC: 31.9 G/DL (ref 31.5–36.5)
MCV RBC AUTO: 93 FL (ref 78–100)
NONHDLC SERPL-MCNC: 81 MG/DL
PLATELET # BLD AUTO: 196 10E9/L (ref 150–450)
POTASSIUM SERPL-SCNC: 4 MMOL/L (ref 3.4–5.3)
RBC # BLD AUTO: 4.33 10E12/L (ref 3.8–5.2)
SODIUM SERPL-SCNC: 138 MMOL/L (ref 133–144)
TRIGL SERPL-MCNC: 71 MG/DL
WBC # BLD AUTO: 5.3 10E9/L (ref 4–11)

## 2021-06-14 PROCEDURE — 80061 LIPID PANEL: CPT | Performed by: INTERNAL MEDICINE

## 2021-06-14 PROCEDURE — 93306 TTE W/DOPPLER COMPLETE: CPT | Mod: 26 | Performed by: INTERNAL MEDICINE

## 2021-06-14 PROCEDURE — 93306 TTE W/DOPPLER COMPLETE: CPT

## 2021-06-14 PROCEDURE — 80048 BASIC METABOLIC PNL TOTAL CA: CPT | Performed by: INTERNAL MEDICINE

## 2021-06-14 PROCEDURE — 36415 COLL VENOUS BLD VENIPUNCTURE: CPT | Performed by: INTERNAL MEDICINE

## 2021-06-14 PROCEDURE — 84460 ALANINE AMINO (ALT) (SGPT): CPT | Performed by: INTERNAL MEDICINE

## 2021-06-14 PROCEDURE — 85027 COMPLETE CBC AUTOMATED: CPT | Performed by: INTERNAL MEDICINE

## 2021-06-15 ENCOUNTER — OFFICE VISIT (OUTPATIENT)
Dept: CARDIOLOGY | Facility: CLINIC | Age: 81
End: 2021-06-15
Attending: INTERNAL MEDICINE
Payer: COMMERCIAL

## 2021-06-15 VITALS
BODY MASS INDEX: 27.49 KG/M2 | WEIGHT: 161 LBS | HEART RATE: 88 BPM | HEIGHT: 64 IN | SYSTOLIC BLOOD PRESSURE: 134 MMHG | OXYGEN SATURATION: 96 % | DIASTOLIC BLOOD PRESSURE: 84 MMHG

## 2021-06-15 DIAGNOSIS — I10 BENIGN ESSENTIAL HYPERTENSION: ICD-10-CM

## 2021-06-15 DIAGNOSIS — I50.812 CHRONIC RIGHT-SIDED HEART FAILURE (H): ICD-10-CM

## 2021-06-15 DIAGNOSIS — I48.19 PERSISTENT ATRIAL FIBRILLATION (H): ICD-10-CM

## 2021-06-15 DIAGNOSIS — I50.32 CHRONIC DIASTOLIC CONGESTIVE HEART FAILURE (H): Primary | ICD-10-CM

## 2021-06-15 LAB
MDC_IDC_LEAD_IMPLANT_DT: NORMAL
MDC_IDC_LEAD_LOCATION: NORMAL
MDC_IDC_LEAD_LOCATION_DETAIL_1: NORMAL
MDC_IDC_LEAD_MFG: NORMAL
MDC_IDC_LEAD_MODEL: NORMAL
MDC_IDC_LEAD_POLARITY_TYPE: NORMAL
MDC_IDC_LEAD_SERIAL: NORMAL
MDC_IDC_MSMT_BATTERY_DTM: NORMAL
MDC_IDC_MSMT_BATTERY_REMAINING_LONGEVITY: 132 MO
MDC_IDC_MSMT_BATTERY_RRT_TRIGGER: 2.62
MDC_IDC_MSMT_BATTERY_STATUS: NORMAL
MDC_IDC_MSMT_BATTERY_VOLTAGE: 2.96 V
MDC_IDC_MSMT_LEADCHNL_RV_IMPEDANCE_VALUE: 380 OHM
MDC_IDC_MSMT_LEADCHNL_RV_IMPEDANCE_VALUE: 437 OHM
MDC_IDC_MSMT_LEADCHNL_RV_PACING_THRESHOLD_AMPLITUDE: 0.5 V
MDC_IDC_MSMT_LEADCHNL_RV_PACING_THRESHOLD_PULSEWIDTH: 0.4 MS
MDC_IDC_MSMT_LEADCHNL_RV_SENSING_INTR_AMPL: 7.12 MV
MDC_IDC_MSMT_LEADCHNL_RV_SENSING_INTR_AMPL: 7.12 MV
MDC_IDC_PG_IMPLANT_DTM: NORMAL
MDC_IDC_PG_MFG: NORMAL
MDC_IDC_PG_MODEL: NORMAL
MDC_IDC_PG_SERIAL: NORMAL
MDC_IDC_PG_TYPE: NORMAL
MDC_IDC_SESS_CLINIC_NAME: NORMAL
MDC_IDC_SESS_DTM: NORMAL
MDC_IDC_SESS_TYPE: NORMAL
MDC_IDC_SET_BRADY_HYSTRATE: NORMAL
MDC_IDC_SET_BRADY_LOWRATE: 60 {BEATS}/MIN
MDC_IDC_SET_BRADY_MAX_SENSOR_RATE: 120 {BEATS}/MIN
MDC_IDC_SET_BRADY_MODE: NORMAL
MDC_IDC_SET_LEADCHNL_RV_PACING_AMPLITUDE: 2 V
MDC_IDC_SET_LEADCHNL_RV_PACING_ANODE_ELECTRODE_1: NORMAL
MDC_IDC_SET_LEADCHNL_RV_PACING_ANODE_LOCATION_1: NORMAL
MDC_IDC_SET_LEADCHNL_RV_PACING_CAPTURE_MODE: NORMAL
MDC_IDC_SET_LEADCHNL_RV_PACING_CATHODE_ELECTRODE_1: NORMAL
MDC_IDC_SET_LEADCHNL_RV_PACING_CATHODE_LOCATION_1: NORMAL
MDC_IDC_SET_LEADCHNL_RV_PACING_POLARITY: NORMAL
MDC_IDC_SET_LEADCHNL_RV_PACING_PULSEWIDTH: 0.4 MS
MDC_IDC_SET_LEADCHNL_RV_SENSING_ANODE_ELECTRODE_1: NORMAL
MDC_IDC_SET_LEADCHNL_RV_SENSING_ANODE_LOCATION_1: NORMAL
MDC_IDC_SET_LEADCHNL_RV_SENSING_CATHODE_ELECTRODE_1: NORMAL
MDC_IDC_SET_LEADCHNL_RV_SENSING_CATHODE_LOCATION_1: NORMAL
MDC_IDC_SET_LEADCHNL_RV_SENSING_POLARITY: NORMAL
MDC_IDC_SET_LEADCHNL_RV_SENSING_SENSITIVITY: 0.9 MV
MDC_IDC_SET_ZONE_DETECTION_INTERVAL: 360 MS
MDC_IDC_SET_ZONE_TYPE: NORMAL
MDC_IDC_STAT_BRADY_DTM_END: NORMAL
MDC_IDC_STAT_BRADY_DTM_START: NORMAL
MDC_IDC_STAT_BRADY_RV_PERCENT_PACED: 99.99 %
MDC_IDC_STAT_EPISODE_RECENT_COUNT: 0
MDC_IDC_STAT_EPISODE_RECENT_COUNT: 0
MDC_IDC_STAT_EPISODE_RECENT_COUNT_DTM_END: NORMAL
MDC_IDC_STAT_EPISODE_RECENT_COUNT_DTM_END: NORMAL
MDC_IDC_STAT_EPISODE_RECENT_COUNT_DTM_START: NORMAL
MDC_IDC_STAT_EPISODE_RECENT_COUNT_DTM_START: NORMAL
MDC_IDC_STAT_EPISODE_TOTAL_COUNT: 0
MDC_IDC_STAT_EPISODE_TOTAL_COUNT: 0
MDC_IDC_STAT_EPISODE_TOTAL_COUNT_DTM_END: NORMAL
MDC_IDC_STAT_EPISODE_TOTAL_COUNT_DTM_END: NORMAL
MDC_IDC_STAT_EPISODE_TOTAL_COUNT_DTM_START: NORMAL
MDC_IDC_STAT_EPISODE_TOTAL_COUNT_DTM_START: NORMAL
MDC_IDC_STAT_EPISODE_TYPE: NORMAL

## 2021-06-15 PROCEDURE — 99215 OFFICE O/P EST HI 40 MIN: CPT | Performed by: NURSE PRACTITIONER

## 2021-06-15 RX ORDER — TORSEMIDE 10 MG/1
30 TABLET ORAL DAILY
Qty: 180 TABLET | Refills: 1 | Status: SHIPPED | OUTPATIENT
Start: 2021-06-15 | End: 2021-08-31

## 2021-06-15 RX ORDER — METOPROLOL TARTRATE 25 MG/1
25 TABLET, FILM COATED ORAL 2 TIMES DAILY
Qty: 180 TABLET | Refills: 3 | Status: SHIPPED | OUTPATIENT
Start: 2021-06-15 | End: 2021-08-31

## 2021-06-15 ASSESSMENT — MIFFLIN-ST. JEOR: SCORE: 1187.35

## 2021-06-15 NOTE — PROGRESS NOTES
Cardiology Clinic Progress Note  Priya Funez MRN# [id number]   YOB: 1940 Age: 80 year old   Primary Cardiologist: Dr. Morales Reason for visit: CORE follow up            Assessment and Plan:   Priya Funez is a very pleasant 80 year old female with a history of HFpEF, atrial fibrillation s/p AV leo ablation with PPM implantation 1/11/2019, emergent aortic dissection repair 1/4/19, hypertension and obesity.     1.  Chronic diastolic heart failure/HFpEF/right heart failure :  LVEF 60-65%, LV normal size/function, RV mild to moderately dilated, RV systolic function mildly decreased.               - NYHA class III, stage C              - Fluid status : mildly volume up              - Diuretic regimen : INCREASE torsemide to 30mg daily              - Aldosterone antagonist : will consider in future.               - Blood pressure : controlled              - Reinforced HF education provided today, reviewed low sodium diet and when to notify CORE clinic.  2. Chronic atrial fibrillation s/p AV node ablation 1/2019 - stable, asymptomatic. Most recent device interrogation was stable.                - Continue routine follow up with EP and device clinic              - [id number] score 3 (age, female, HF)              - Continue warfarin for thromboembolic prophylaxis  3. Emergent aortic dissection repair 1/4/19              - Repeat CT 6 months after surgery for surveillance completed 8/7/2019 which was stable, showing similar appearance as post repair.   4. Hypertension - controlled, continue current medications  5. Obesity - counseled patient on weight loss strategies.  6. Mild mitral regurgitation  7. Moderate to severe tricuspid regurgitation     I saw Priya for CORE follow up. Reviewed echocardiogram results with her which were completed 6/14, which showed no significant change. She has noted some weight gain with new/worsening orthopnea/PND. Dietary indiscretions with sodium are likely  contributing. We reviewed consideration for titration of her diuretics, she preferred to try 30mg daily which is reasonable, but I noted that if her symptoms continued and weight remained elevated would recommend further increasing to 40mg daily. Reinforced low sodium diet. Plan for BMP next week and CORE follow up with me in 2 weeks. All questions answered. Encouraged to call with any questions/concerns.       Changes today: INCREASE torsemide to 30mg daily (per patient preference)    Follow up plan:    BMP next week     CORE follow up with me in 2 weeks         History of Presenting Illness:    Priya Funez is a very pleasant 80 year old female with a history of HFpEF, atrial fibrillation s/p AV leo ablation with PPM implantation 1/11/2019, emergent aortic dissection repair 1/4/19, hypertension and obesity.      Patient presented in January with dissecting aortic aneurysm, she was hospitalized from 1/4/19-1/29/19. Patient had a complicated hospital course. There was prolonged ischemia to the SMA and right renal regions. She required tracheostomy placement and was discharged to [place] on ventilatory support. Patients tracheostomy has been discontinued and she transitioned home the beginning of June.      Post hospitalization she was evaluated by Dr. Morales in July 2019 at which point patient was noted to be volume up on exam. Recommended increasing her torsemide to 20mg BID. Lymphedema clinic referral placed and CORE consult ordered. I first met patient for CORE enrollment 7/9/19. She has been following closely in CORE clinic since.      Echocardiogram 6/26/19 showed LVEF 60-65%, LV normal size/function, RV mild to moderately dilated, RV systolic function mildly decreased, mild MR, moderate tricuspid regurgitation.  She underwent surveillance CT on 8/7/2019 for aortic dissection repair which was stable, showing similar appearance as post repair.      Fall 2020 she was having complaints of worsening dyspnea  "and cough, her torsemide was increased to 20mg daily which resulted in improved symptoms.     This spring she established care with Dr. Kwok in the setting of Dr. Morales's [facility] at which time she was doing well. No medications were changed. Recommended 3 month follow up with echocardiogram and labs prior.     Echocardiogram 6/14/21 showed no change when compared to prior, LVEF 60-65%, RV moderately dilated with mildly reduced RV systolic function. Mild mitral regurgitation. Moderate to severe tricuspid regurgitation.     Patient is here today for CORE follow up.     Patient reports feeling good. Monitoring weights daily a home. Weight today at home 158#, states weight has gone up 5#. Notes she has been at a lot of parties with \"potato salad\" and high sodium foods. Notes some new/worsening orthopnea/PND. Denies shortness of breath at rest. Exertional dyspnea when going up stairs. Denies lower extremity edema. Denies abdominal abdominal distention/bloating. Denies chest pain or chest tightness. Denies dizziness, lightheadedness or other presyncopal symptoms. Denies tachycardia or palpitations. Taking medications daily.      Labs from 6/14 showed slight bump in kidney function, creatinine 1.27, stable electrolytes. Hemoglobin 12.8. Blood pressure 134/84 and HR 88 in clinic today.    Appetite good, \"too good\". Eating most meals at home, notes recently she has had a lot family parties. No set exercise routine, [place] her . Also getting some exercise with watering her flowers. Denies alcohol use. Occasional small glass of wine.         Recent Hospitalizations   1/4/19-1/29/19 r/t dissecting aortic aneurysm, underwent emergent repair 1/4/19. Patient had a complicated hospital course. There was prolonged ischemia to the SMA and right renal regions. She required tracheostomy placement and was discharged to [place] on ventilatory support.         Social History  "   , 4 children, 8 grandchildren, enjoys her gardens in the summer.   Social History     Socioeconomic History     Marital status:      Spouse name: Not on file     Number of children: Not on file     Years of education: Not on file     Highest education level: Not on file   Occupational History     Occupation: Retired - customer service   Social Needs     Financial resource strain: Not on file     Food insecurity     Worry: Not on file     Inability: Not on file     Transportation needs     Medical: Not on file     Non-medical: Not on file   Tobacco Use     Smoking status: Never Smoker     Smokeless tobacco: Never Used   Substance and Sexual Activity     Alcohol use: Not Currently     Drug use: No     Sexual activity: Not on file   Lifestyle     Physical activity     Days per week: Not on file     Minutes per session: Not on file     Stress: Not on file   Relationships     Social connections     Talks on phone: Not on file     Gets together: Not on file     Attends Rastafarian service: Not on file     Active member of club or organization: Not on file     Attends meetings of clubs or organizations: Not on file     Relationship status: Not on file     Intimate partner violence     Fear of current or ex partner: Not on file     Emotionally abused: Not on file     Physically abused: Not on file     Forced sexual activity: Not on file   Other Topics Concern     Parent/sibling w/ CABG, MI or angioplasty before 65F 55M? Not Asked   Social History Narrative    .    Lives in home with . Fully independent.    4 adult children.    8 grandchildren.            Review of Systems:   Skin:  Negative     Eyes:  Positive for glasses  ENT:  Negative    Respiratory:  Positive for dyspnea on exertion;shortness of breath;mucoid expectorant;cough  Cardiovascular:    Positive for;fatigue  Gastroenterology: Negative    Genitourinary:  Positive for urinary frequency  Musculoskeletal:  Positive for arthritis;joint  "pain  Neurologic:  Negative    Psychiatric:  Positive for anxiety;depression  Heme/Lymph/Imm:  Positive for allergies  Endocrine:  Negative           Physical Exam:   Vitals: /84   Pulse 88   Ht 1.613 m (5' 3.5\")   Wt 73 kg (161 lb)   SpO2 96%   BMI 28.07 kg/m     Wt Readings from Last 4 Encounters:   06/15/21 73 kg (161 lb)   03/25/21 72.6 kg (160 lb)   12/21/20 72.1 kg (159 lb)   12/17/20 72.1 kg (159 lb)     GEN: well nourished, in no acute distress.  HEENT:  Pupils equal, round. Sclerae nonicteric.   NECK: Supple, no masses appreciated. JVD appreciated on exam.   C/V:  Regular rate and rhythm, soft systolic murmur  RESP: Respirations are unlabored. Clear to auscultation bilaterally without wheezing, rales, or rhonchi.  GI: Abdomen soft, nontender.  EXTREM: Trace bilateral LE edema.  NEURO: Alert and oriented, cooperative.  SKIN: Warm and dry.        Data:     LIPID RESULTS:  Lab Results   Component Value Date    CHOL 134 06/14/2021    HDL 53 06/14/2021    LDL 67 06/14/2021    TRIG 71 06/14/2021     LIVER ENZYME RESULTS:  Lab Results   Component Value Date    AST 27 04/04/2019    ALT 22 06/14/2021     CBC RESULTS:  Lab Results   Component Value Date    WBC 5.3 06/14/2021    RBC 4.33 06/14/2021    HGB 12.8 06/14/2021    HCT 40.1 06/14/2021    MCV 93 06/14/2021    MCH 29.6 06/14/2021    MCHC 31.9 06/14/2021    RDW 13.5 06/14/2021     06/14/2021     BMP RESULTS:  Lab Results   Component Value Date     06/14/2021    POTASSIUM 4.0 06/14/2021    CHLORIDE 106 06/14/2021    CO2 30 06/14/2021    ANIONGAP 2 (L) 06/14/2021    GLC 78 06/14/2021    BUN 29 06/14/2021    CR 1.27 (H) 06/14/2021    GFRESTIMATED 40 (L) 06/14/2021    GFRESTBLACK 47 (L) 06/14/2021    BESS 9.0 06/14/2021      A1C RESULTS:  Lab Results   Component Value Date    A1C 5.3 01/04/2019     INR RESULTS:  Lab Results   Component Value Date    INR 2.40 (H) 06/10/2021    INR 2.30 (H) 05/20/2021            Medications     Current " Outpatient Medications   Medication Sig Dispense Refill     Acetaminophen 325 MG CAPS Take 325-650 mg by mouth every 4 hours as needed       alendronate (FOSAMAX) 70 MG tablet Take 1 tablet (70 mg) by mouth every 7 days 12 tablet 3     aspirin (ASA) 81 MG EC tablet Take 81 mg by mouth every 24 hours       cholecalciferol (VITAMIN D-1000 MAX ST) 1000 units TABS Take 1,000 Units by mouth daily        cyanocobalamin (VITAMIN B-12) 100 MCG tablet Take 1,000 mcg by mouth daily        escitalopram (LEXAPRO) 10 MG tablet Take 1 tablet (10 mg) by mouth daily 90 tablet 3     metoprolol tartrate (LOPRESSOR) 25 MG tablet Take 1 tablet (25 mg) by mouth 2 times daily 180 tablet 0     mirtazapine (REMERON) 15 MG tablet Take 1 tablet (15 mg) by mouth At Bedtime 90 tablet 3     torsemide (DEMADEX) 20 MG tablet Take 1 tablet (20 mg) by mouth daily 90 tablet 3     warfarin ANTICOAGULANT (COUMADIN) 2 MG tablet Take 2-3 tablets (4-6 mg) by mouth daily Take 3 tablets (6 mg) every Mon; Take 2 tablets (4mg) all other days. Adjust dose per INR result as directed 200 tablet 1          Past Medical History     Past Medical History:   Diagnosis Date     Benign essential hypertension      Chronic atrial fibrillation (H)     s/p AV node ablation January, 2019     Chronic diastolic heart failure (H)      Chronic kidney disease, stage III (moderate)      Past Surgical History:   Procedure Laterality Date     ANGIOGRAM Right 01/04/2019    Procedure: DIAGONSTIC ANGIOGRAM OF SMA;  Surgeon: Rigo Jennings MD;  Location:  OR     BYPASS GRAFT ARTERY CORONARY, REPAIR VALVE AORTIC, COMBINED N/A 01/04/2019    Procedure: AORTIC DISSECTION REPAIR WITH GRAFT- HEMASHIELD PLATINUM WOVEN DOUBLE VELOR 4 SIDE ARM VASCULAR GRAFT, D:35 X 12 X 10 X 10MM/ L:50CM;  Surgeon: Jose Winslow MD;  Location:  OR     EP ABLATION AV NODE N/A 01/11/2019    Procedure: EP Ablation AV Node;  Surgeon: Tsering Davey MD;  Location:  HEART CARDIAC CATH LAB      EP PACEMAKER Left 01/11/2019    Procedure: EP Pacemaker;  Surgeon: Tsering Davey MD;  Location:  HEART CARDIAC CATH LAB     THYROID SURGERY      benign mass removed     TRACHEOSTOMY N/A 01/25/2019    Procedure: TRACHEOSTOMY  (DR MCCLELLAND);  Surgeon: Bryson Mcclelland MD;  Location:  OR     Family History   Problem Relation Age of Onset     Diabetes No family hx of      Myocardial Infarction No family hx of      Cerebrovascular Disease No family hx of      Coronary Artery Disease Early Onset No family hx of      Breast Cancer No family hx of      Colon Cancer No family hx of      Ovarian Cancer No family hx of             Allergies   Amoxicillin and Ciprofloxacin        TAMRA [facility]  Pager: [phone]

## 2021-06-15 NOTE — LETTER
6/15/2021    Sharmaine Burks MD  [address] [facility] [place] [address]    RE: Priya Funez       Dear Colleague,    I had the pleasure of seeing Priya Funez in the [facility].    Cardiology Clinic Progress Note  Priya Funez MRN# [id number]   YOB: 1940 Age: 80 year old   Primary Cardiologist: Dr. Morales Reason for visit: CORE follow up            Assessment and Plan:   Priya Funez is a very pleasant 80 year old female with a history of HFpEF, atrial fibrillation s/p AV leo ablation with PPM implantation 1/11/2019, emergent aortic dissection repair 1/4/19, hypertension and obesity.     1.  Chronic diastolic heart failure/HFpEF/right heart failure :  LVEF 60-65%, LV normal size/function, RV mild to moderately dilated, RV systolic function mildly decreased.               - NYHA class III, stage C              - Fluid status : mildly volume up              - Diuretic regimen : INCREASE torsemide to 30mg daily              - Aldosterone antagonist : will consider in future.               - Blood pressure : controlled              - Reinforced HF education provided today, reviewed low sodium diet and when to notify CORE clinic.  2. Chronic atrial fibrillation s/p AV node ablation 1/2019 - stable, asymptomatic. Most recent device interrogation was stable.                - Continue routine follow up with EP and device clinic              - [id number] score 3 (age, female, HF)              - Continue warfarin for thromboembolic prophylaxis  3. Emergent aortic dissection repair 1/4/19              - Repeat CT 6 months after surgery for surveillance completed 8/7/2019 which was stable, showing similar appearance as post repair.   4. Hypertension - controlled, continue current medications  5. Obesity - counseled patient on weight loss strategies.  6. Mild mitral regurgitation  7. Moderate to severe tricuspid regurgitation     I saw  Priya for CORE follow up. Reviewed echocardiogram results with her which were completed 6/14, which showed no significant change. She has noted some weight gain with new/worsening orthopnea/PND. Dietary indiscretions with sodium are likely contributing. We reviewed consideration for titration of her diuretics, she preferred to try 30mg daily which is reasonable, but I noted that if her symptoms continued and weight remained elevated would recommend further increasing to 40mg daily. Reinforced low sodium diet. Plan for BMP next week and CORE follow up with me in 2 weeks. All questions answered. Encouraged to call with any questions/concerns.       Changes today: INCREASE torsemide to 30mg daily (per patient preference)    Follow up plan:    BMP next week     CORE follow up with me in 2 weeks         History of Presenting Illness:    Priya Funez is a very pleasant 80 year old female with a history of HFpEF, atrial fibrillation s/p AV leo ablation with PPM implantation 1/11/2019, emergent aortic dissection repair 1/4/19, hypertension and obesity.      Patient presented in January with dissecting aortic aneurysm, she was hospitalized from 1/4/19-1/29/19. Patient had a complicated hospital course. There was prolonged ischemia to the SMA and right renal regions. She required tracheostomy placement and was discharged to [facility] on ventilatory support. Patients tracheostomy has been discontinued and she transitioned home the beginning of June.      Post hospitalization she was evaluated by Dr. Morales in July 2019 at which point patient was noted to be volume up on exam. Recommended increasing her torsemide to 20mg BID. Lymphedema clinic referral placed and CORE consult ordered. I first met patient for CORE enrollment 7/9/19. She has been following closely in CORE clinic since.      Echocardiogram 6/26/19 showed LVEF 60-65%, LV normal size/function, RV mild to moderately dilated, RV systolic function mildly  "decreased, mild MR, moderate tricuspid regurgitation.  She underwent surveillance CT on 8/7/2019 for aortic dissection repair which was stable, showing similar appearance as post repair.      Fall 2020 she was having complaints of worsening dyspnea and cough, her torsemide was increased to 20mg daily which resulted in improved symptoms.     This spring she established care with Dr. Kwok in the setting of Dr. Morales's skilled nursing at which time she was doing well. No medications were changed. Recommended 3 month follow up with echocardiogram and labs prior.     Echocardiogram 6/14/21 showed no change when compared to prior, LVEF 60-65%, RV moderately dilated with mildly reduced RV systolic function. Mild mitral regurgitation. Moderate to severe tricuspid regurgitation.     Patient is here today for CORE follow up.     Patient reports feeling good. Monitoring weights daily a home. Weight today at home 158#, states weight has gone up 5#. Notes she has been at a lot of parties with \"potato salad\" and high sodium foods. Notes some new/worsening orthopnea/PND. Denies shortness of breath at rest. Exertional dyspnea when going up stairs. Denies lower extremity edema. Denies abdominal abdominal distention/bloating. Denies chest pain or chest tightness. Denies dizziness, lightheadedness or other presyncopal symptoms. Denies tachycardia or palpitations. Taking medications daily.      Labs from 6/14 showed slight bump in kidney function, creatinine 1.27, stable electrolytes. Hemoglobin 12.8. Blood pressure 134/84 and HR 88 in clinic today.    Appetite good, \"too good\". Eating most meals at home, notes recently she has had a lot family parties. No set exercise routine, [place] with her . Also getting some exercise with watering her flowers. Denies alcohol use. Occasional small glass of wine.         Recent Hospitalizations   1/4/19-1/29/19 r/t dissecting aortic aneurysm, underwent " emergent repair 1/4/19. Patient had a complicated hospital course. There was prolonged ischemia to the SMA and right renal regions. She required tracheostomy placement and was discharged to [facility] on ventilatory support.         Social History    , 4 children, 8 grandchildren, enjoys her gardens in the summer.   Social History     Socioeconomic History     Marital status:      Spouse name: Not on file     Number of children: Not on file     Years of education: Not on file     Highest education level: Not on file   Occupational History     Occupation: Retired - customer service   Social Needs     Financial resource strain: Not on file     Food insecurity     Worry: Not on file     Inability: Not on file     Transportation needs     Medical: Not on file     Non-medical: Not on file   Tobacco Use     Smoking status: Never Smoker     Smokeless tobacco: Never Used   Substance and Sexual Activity     Alcohol use: Not Currently     Drug use: No     Sexual activity: Not on file   Lifestyle     Physical activity     Days per week: Not on file     Minutes per session: Not on file     Stress: Not on file   Relationships     Social connections     Talks on phone: Not on file     Gets together: Not on file     Attends Congregational service: Not on file     Active member of club or organization: Not on file     Attends meetings of clubs or organizations: Not on file     Relationship status: Not on file     Intimate partner violence     Fear of current or ex partner: Not on file     Emotionally abused: Not on file     Physically abused: Not on file     Forced sexual activity: Not on file   Other Topics Concern     Parent/sibling w/ CABG, MI or angioplasty before 65F 55M? Not Asked   Social History Narrative    .    Lives in home with . Fully independent.    4 adult children.    8 grandchildren.            Review of Systems:   Skin:  Negative     Eyes:  Positive for glasses  ENT:  Negative    Respiratory:   "Positive for dyspnea on exertion;shortness of breath;mucoid expectorant;cough  Cardiovascular:    Positive for;fatigue  Gastroenterology: Negative    Genitourinary:  Positive for urinary frequency  Musculoskeletal:  Positive for arthritis;joint pain  Neurologic:  Negative    Psychiatric:  Positive for anxiety;depression  Heme/Lymph/Imm:  Positive for allergies  Endocrine:  Negative           Physical Exam:   Vitals: /84   Pulse 88   Ht 1.613 m (5' 3.5\")   Wt 73 kg (161 lb)   SpO2 96%   BMI 28.07 kg/m     Wt Readings from Last 4 Encounters:   06/15/21 73 kg (161 lb)   03/25/21 72.6 kg (160 lb)   12/21/20 72.1 kg (159 lb)   12/17/20 72.1 kg (159 lb)     GEN: well nourished, in no acute distress.  HEENT:  Pupils equal, round. Sclerae nonicteric.   NECK: Supple, no masses appreciated. JVD appreciated on exam.   C/V:  Regular rate and rhythm, soft systolic murmur  RESP: Respirations are unlabored. Clear to auscultation bilaterally without wheezing, rales, or rhonchi.  GI: Abdomen soft, nontender.  EXTREM: Trace bilateral LE edema.  NEURO: Alert and oriented, cooperative.  SKIN: Warm and dry.        Data:     LIPID RESULTS:  Lab Results   Component Value Date    CHOL 134 06/14/2021    HDL 53 06/14/2021    LDL 67 06/14/2021    TRIG 71 06/14/2021     LIVER ENZYME RESULTS:  Lab Results   Component Value Date    AST 27 04/04/2019    ALT 22 06/14/2021     CBC RESULTS:  Lab Results   Component Value Date    WBC 5.3 06/14/2021    RBC 4.33 06/14/2021    HGB 12.8 06/14/2021    HCT 40.1 06/14/2021    MCV 93 06/14/2021    MCH 29.6 06/14/2021    MCHC 31.9 06/14/2021    RDW 13.5 06/14/2021     06/14/2021     BMP RESULTS:  Lab Results   Component Value Date     06/14/2021    POTASSIUM 4.0 06/14/2021    CHLORIDE 106 06/14/2021    CO2 30 06/14/2021    ANIONGAP 2 (L) 06/14/2021    GLC 78 06/14/2021    BUN 29 06/14/2021    CR 1.27 (H) 06/14/2021    GFRESTIMATED 40 (L) 06/14/2021    GFRESTBLACK 47 (L) 06/14/2021    " BESS 9.0 06/14/2021      A1C RESULTS:  Lab Results   Component Value Date    A1C 5.3 01/04/2019     INR RESULTS:  Lab Results   Component Value Date    INR 2.40 (H) 06/10/2021    INR 2.30 (H) 05/20/2021            Medications     Current Outpatient Medications   Medication Sig Dispense Refill     Acetaminophen 325 MG CAPS Take 325-650 mg by mouth every 4 hours as needed       alendronate (FOSAMAX) 70 MG tablet Take 1 tablet (70 mg) by mouth every 7 days 12 tablet 3     aspirin (ASA) 81 MG EC tablet Take 81 mg by mouth every 24 hours       cholecalciferol (VITAMIN D-1000 MAX ST) 1000 units TABS Take 1,000 Units by mouth daily        cyanocobalamin (VITAMIN B-12) 100 MCG tablet Take 1,000 mcg by mouth daily        escitalopram (LEXAPRO) 10 MG tablet Take 1 tablet (10 mg) by mouth daily 90 tablet 3     metoprolol tartrate (LOPRESSOR) 25 MG tablet Take 1 tablet (25 mg) by mouth 2 times daily 180 tablet 0     mirtazapine (REMERON) 15 MG tablet Take 1 tablet (15 mg) by mouth At Bedtime 90 tablet 3     torsemide (DEMADEX) 20 MG tablet Take 1 tablet (20 mg) by mouth daily 90 tablet 3     warfarin ANTICOAGULANT (COUMADIN) 2 MG tablet Take 2-3 tablets (4-6 mg) by mouth daily Take 3 tablets (6 mg) every Mon; Take 2 tablets (4mg) all other days. Adjust dose per INR result as directed 200 tablet 1          Past Medical History     Past Medical History:   Diagnosis Date     Benign essential hypertension      Chronic atrial fibrillation (H)     s/p AV node ablation January, 2019     Chronic diastolic heart failure (H)      Chronic kidney disease, stage III (moderate)      Past Surgical History:   Procedure Laterality Date     ANGIOGRAM Right 01/04/2019    Procedure: DIAGONSTIC ANGIOGRAM OF SMA;  Surgeon: Rigo Jennings MD;  Location: [facility] OR     BYPASS GRAFT ARTERY CORONARY, REPAIR VALVE AORTIC, COMBINED N/A 01/04/2019    Procedure: AORTIC DISSECTION REPAIR WITH GRAFT- HEMASHIELD PLATINUM WOVEN DOUBLE VELOR 4 SIDE ARM  VASCULAR GRAFT, D:35 X 12 X 10 X 10MM/ L:50CM;  Surgeon: Jose Winslow MD;  Location:  OR     EP ABLATION AV NODE N/A 01/11/2019    Procedure: EP Ablation AV Node;  Surgeon: Tsering Davey MD;  Location:  [facility] CATH LAB     EP PACEMAKER Left 01/11/2019    Procedure: EP Pacemaker;  Surgeon: Tsering Davey MD;  Location:  HEART [facility]ARD[facility] CATH LAB     THYROID SURGERY      benign mass removed     TRACHEOSTOMY N/A 01/25/2019    Procedure: TRACHEOSTOMY  (DR MCCLELLAND);  Surgeon: Bryson Mcclelland MD;  Location:  OR     Family History   Problem Relation Age of Onset     Diabetes No family hx of      Myocardial Infarction No family hx of      Cerebrovascular Disease No family hx of      Coronary Artery Disease Early Onset No family hx of      Breast Cancer No family hx of      Colon Cancer No family hx of      Ovarian Cancer No family hx of             Allergies   Amoxicillin and Ciprofloxacin        TAMRA Ryan [facility]  Pager: [phone]    cc:   Becky Kwok MD  [address] [id number]

## 2021-06-15 NOTE — PATIENT INSTRUCTIONS
Call CORE nurse for any questions or concerns Mon-Fri 8am-4pm:                              [phone]                     For concerns after hours:                                [phone]     Medication changes: INCREASE torsemide to 30mg daily (1.5 tablets)  Plan from today:    - Labs next week to monitor kidney function   - Follow up with Xochitl in 2 weeks   - Limit sodium   Lab results: see attached;   Component      Latest Ref Rng & Units 6/14/2021   Sodium      133 - 144 mmol/L 138   Potassium      3.4 - 5.3 mmol/L 4.0   Chloride      94 - 109 mmol/L 106   Carbon Dioxide      20 - 32 mmol/L 30   Anion Gap      3 - 14 mmol/L 2 (L)   Glucose      70 - 99 mg/dL 78   Urea Nitrogen      7 - 30 mg/dL 29   Creatinine      0.52 - 1.04 mg/dL 1.27 (H)   GFR Estimate      >60 mL/min/1.73:m2 40 (L)   GFR Estimate If Black      >60 mL/min/1.73:m2 47 (L)   Calcium      8.5 - 10.1 mg/dL 9.0   WBC      4.0 - 11.0 10e9/L 5.3   RBC Count      3.8 - 5.2 10e12/L 4.33   Hemoglobin      11.7 - 15.7 g/dL 12.8   Hematocrit      35.0 - 47.0 % 40.1   MCV      78 - 100 fl 93   MCH      26.5 - 33.0 pg 29.6   MCHC      31.5 - 36.5 g/dL 31.9   RDW      10.0 - 15.0 % 13.5   Platelet Count      150 - 450 10e9/L 196   Cholesterol      <200 mg/dL 134   Triglycerides      <150 mg/dL 71   HDL Cholesterol      >49 mg/dL 53   LDL Cholesterol Calculated      <100 mg/dL 67   Non HDL Cholesterol      <130 mg/dL 81

## 2021-06-23 DIAGNOSIS — I50.32 CHRONIC DIASTOLIC CONGESTIVE HEART FAILURE (H): ICD-10-CM

## 2021-06-23 LAB
ANION GAP SERPL CALCULATED.3IONS-SCNC: 3 MMOL/L (ref 3–14)
BUN SERPL-MCNC: 30 MG/DL (ref 7–30)
CALCIUM SERPL-MCNC: 9.2 MG/DL (ref 8.5–10.1)
CHLORIDE SERPL-SCNC: 104 MMOL/L (ref 94–109)
CO2 SERPL-SCNC: 31 MMOL/L (ref 20–32)
CREAT SERPL-MCNC: 1.29 MG/DL (ref 0.52–1.04)
GFR SERPL CREATININE-BSD FRML MDRD: 39 ML/MIN/{1.73_M2}
GLUCOSE SERPL-MCNC: 86 MG/DL (ref 70–99)
NT-PROBNP SERPL-MCNC: 3163 PG/ML (ref 0–450)
POTASSIUM SERPL-SCNC: 4.5 MMOL/L (ref 3.4–5.3)
SODIUM SERPL-SCNC: 138 MMOL/L (ref 133–144)

## 2021-06-23 PROCEDURE — 80048 BASIC METABOLIC PNL TOTAL CA: CPT | Performed by: NURSE PRACTITIONER

## 2021-06-23 PROCEDURE — 36415 COLL VENOUS BLD VENIPUNCTURE: CPT | Performed by: NURSE PRACTITIONER

## 2021-06-23 PROCEDURE — 83880 ASSAY OF NATRIURETIC PEPTIDE: CPT | Performed by: NURSE PRACTITIONER

## 2021-07-01 ENCOUNTER — OFFICE VISIT (OUTPATIENT)
Dept: CARDIOLOGY | Facility: CLINIC | Age: 81
End: 2021-07-01
Attending: NURSE PRACTITIONER
Payer: COMMERCIAL

## 2021-07-01 VITALS
HEART RATE: 82 BPM | HEIGHT: 64 IN | BODY MASS INDEX: 27.18 KG/M2 | SYSTOLIC BLOOD PRESSURE: 117 MMHG | OXYGEN SATURATION: 97 % | DIASTOLIC BLOOD PRESSURE: 82 MMHG | WEIGHT: 159.2 LBS

## 2021-07-01 DIAGNOSIS — E66.01 CLASS 3 SEVERE OBESITY DUE TO EXCESS CALORIES WITHOUT SERIOUS COMORBIDITY IN ADULT, UNSPECIFIED BMI (H): ICD-10-CM

## 2021-07-01 DIAGNOSIS — E66.813 CLASS 3 SEVERE OBESITY DUE TO EXCESS CALORIES WITHOUT SERIOUS COMORBIDITY IN ADULT, UNSPECIFIED BMI (H): ICD-10-CM

## 2021-07-01 DIAGNOSIS — I48.20 CHRONIC ATRIAL FIBRILLATION (H): ICD-10-CM

## 2021-07-01 DIAGNOSIS — I07.1 TRICUSPID VALVE INSUFFICIENCY, UNSPECIFIED ETIOLOGY: ICD-10-CM

## 2021-07-01 DIAGNOSIS — I10 BENIGN ESSENTIAL HYPERTENSION: ICD-10-CM

## 2021-07-01 DIAGNOSIS — I50.32 CHRONIC DIASTOLIC CONGESTIVE HEART FAILURE (H): Primary | ICD-10-CM

## 2021-07-01 PROCEDURE — 99214 OFFICE O/P EST MOD 30 MIN: CPT | Performed by: NURSE PRACTITIONER

## 2021-07-01 ASSESSMENT — MIFFLIN-ST. JEOR: SCORE: 1179.19

## 2021-07-01 NOTE — PROGRESS NOTES
"Cardiology Clinic Progress Note  Priya Funez MRN# [id number]   YOB: 1940 Age: 80 year old   Primary Cardiologist: Dr. Kwok Reason for visit: CORE follow up            Assessment and Plan:   Priya Funez is a very pleasant 80 year old female with a history of HFpEF, atrial fibrillation s/p AV leo ablation with PPM implantation 1/11/2019, emergent aortic dissection repair 1/4/19, hypertension and obesity.      1.  Chronic diastolic heart failure/HFpEF/right heart failure :  LVEF 60-65%, LV normal size/function, RV mild to moderately dilated, RV systolic function mildly decreased.               - NYHA class III, stage C              - Fluid status : close to euvolemic/mildly volume up              - Diuretic regimen : torsemide to 30mg daily              - Aldosterone antagonist : none, could consider in future.               - Blood pressure : controlled              - Reinforced HF education provided today, reviewed low sodium diet and when to notify CORE clinic.  2. Chronic atrial fibrillation s/p AV node ablation 1/2019 - stable, asymptomatic. Most recent device interrogation was stable.                - Continue routine follow up with EP and device clinic              - [id number] score 3 (age, female, HF)              - Continue warfarin for thromboembolic prophylaxis  3. Emergent aortic dissection repair 1/4/19              - Repeat CT 6 months after surgery for surveillance completed 8/7/2019 which was stable, showing similar appearance as post repair.   4. Hypertension - controlled, continue current medications  5. Obesity - counseled patient on weight loss strategies.  6. Mild mitral regurgitation  7. Moderate to severe tricuspid regurgitation     I saw Priya for CORE follow up. During our last CORE visit 2 weeks ago her torsemide was increased to 30mg, she has noted some improvement weight is down 1-2# but breathing is still \"about the same\", still having some orthopnea and " PND. She continues to have significant indiscretions with sodium intake which she recognizes is the change. We reviewed consideration of further titrating her torsemide but she declines, wishing to focus on closely monitoring her diet. During our visit 2 weeks ago I recommended increasing her torsemide to 40mg daily, which she declined.     Reinforced low sodium diet and signs/symptoms to monitor for of when to notify clinic. Support given today. Encouraged to call with any questions/concerns.     No medication changes today and plan for CORE follow up in 2 months with BMP prior.         Changes today: none    Follow up plan:     CORE follow up with me in 2 months with BMP prior        History of Presenting Illness:    Priya Funez is a very pleasant 80 year old female with a history of HFpEF, atrial fibrillation s/p AV leo ablation with PPM implantation 1/11/2019, emergent aortic dissection repair 1/4/19, hypertension and obesity.      Patient presented in January with dissecting aortic aneurysm, she was hospitalized from 1/4/19-1/29/19. Patient had a complicated hospital course. There was prolonged ischemia to the SMA and right renal regions. She required tracheostomy placement and was discharged to [place] on ventilatory support. Patients tracheostomy has been discontinued and she transitioned home the beginning of June.      Post hospitalization she was evaluated by Dr. Morales in July 2019 at which point patient was noted to be volume up on exam. Recommended increasing her torsemide to 20mg BID. Lymphedema clinic referral placed and CORE consult ordered. I first met patient for CORE enrollment 7/9/19. She has been following closely in CORE clinic since.      Echocardiogram 6/26/19 showed LVEF 60-65%, LV normal size/function, RV mild to moderately dilated, RV systolic function mildly decreased, mild MR, moderate tricuspid regurgitation.  She underwent surveillance CT on 8/7/2019 for aortic dissection  "repair which was stable, showing similar appearance as post repair.      Fall 2020 she was having complaints of worsening dyspnea and cough, her torsemide was increased to 20mg daily which resulted in improved symptoms.      This spring she established care with Dr. Kwok in the setting of Dr. Morales's [facility] at which time she was doing well. No medications were changed.      Echocardiogram 6/14/21 showed no change when compared to prior, LVEF 60-65%, RV moderately dilated with mildly reduced RV systolic function. Mild mitral regurgitation. Moderate to severe tricuspid regurgitation.     I saw patient 2 weeks ago at which time she reported weight gain and new/worsening orthopnea/PND. Dietary indiscretions with sodium are likely contributing. We reviewed consideration for titration of her diuretics, initially recommending torsemide 40mg daily, she preferred to try 30mg daily which was reasonable. Follow up BMP last week showed stable kidney function and electrolytes.     Patient is here today for CORE follow up.     Patient reports feeling good. Monitoring weights daily a home. States her weight has decreased \"a little bit\", down 1-2#. Continues to note multiple indiscretions in her diet, multiple meals out and family parties. Feels no change in symptoms. Continues to have orthopnea and PND. Denies shortness breath. Exertional dyspnea with walks, > 2 blocks or stairs. Able to complete ADLs and IADLs independently. Denies chest pain or chest tightness. Denies dizziness, lightheadedness or other presyncopal symptoms. Denies tachycardia or palpitations. Denies episodes of bleeding. Taking medications daily.     Blood pressure 117/82 and HR 82 in clinic today.    Appetite good, \"too good\". Eating most meals at home, notes recently she has had a lot family parties. No set exercise routine, [place] her . Also getting some exercise with watering her marie. Denies " alcohol use. Occasional small glass of wine.         Recent Hospitalizations   1/4/19-1/29/19 r/t dissecting aortic aneurysm, underwent emergent repair 1/4/19. Patient had a complicated hospital course. There was prolonged ischemia to the SMA and right renal regions. She required tracheostomy placement and was discharged to [place] on ventilatory support.         Social History    , 4 children, 8 grandchildren, enjoys her gardens in the summer.   Social History     Socioeconomic History     Marital status:      Spouse name: Not on file     Number of children: Not on file     Years of education: Not on file     Highest education level: Not on file   Occupational History     Occupation: Retired - customer service   Social Needs     Financial resource strain: Not on file     Food insecurity     Worry: Not on file     Inability: Not on file     Transportation needs     Medical: Not on file     Non-medical: Not on file   Tobacco Use     Smoking status: Never Smoker     Smokeless tobacco: Never Used   Substance and Sexual Activity     Alcohol use: Not Currently     Drug use: No     Sexual activity: Not on file   Lifestyle     Physical activity     Days per week: Not on file     Minutes per session: Not on file     Stress: Not on file   Relationships     Social connections     Talks on phone: Not on file     Gets together: Not on file     Attends Voodoo service: Not on file     Active member of club or organization: Not on file     Attends meetings of clubs or organizations: Not on file     Relationship status: Not on file     Intimate partner violence     Fear of current or ex partner: Not on file     Emotionally abused: Not on file     Physically abused: Not on file     Forced sexual activity: Not on file   Other Topics Concern     Parent/sibling w/ CABG, MI or angioplasty before 65F 55M? Not Asked   Social History Narrative    .    Lives in home with . Fully independent.    4 adult  "children.    8 grandchildren.            Review of Systems:   Skin:  Negative     Eyes:  Positive for glasses  ENT:  Negative    Respiratory:  Positive for dyspnea on exertion;shortness of breath;mucoid expectorant;cough  Cardiovascular:  Negative Positive for;fatigue  Gastroenterology: Negative melena;hematochezia  Genitourinary:  Positive for urinary frequency  Musculoskeletal:  Positive for arthritis;joint pain  Neurologic:  Negative    Psychiatric:  Positive for anxiety;depression  Heme/Lymph/Imm:  Positive for allergies  Endocrine:  Negative           Physical Exam:   Vitals: /82   Pulse 82   Ht 1.613 m (5' 3.5\")   Wt 72.2 kg (159 lb 3.2 oz)   SpO2 97%   BMI 27.76 kg/m     Wt Readings from Last 4 Encounters:   07/01/21 72.2 kg (159 lb 3.2 oz)   06/15/21 73 kg (161 lb)   03/25/21 72.6 kg (160 lb)   12/21/20 72.1 kg (159 lb)     GEN: well nourished, in no acute distress.  HEENT:  Pupils equal, round. Sclerae nonicteric.   NECK: Supple, no masses appreciated.   C/V:  Regular rate and rhythm, no murmur, rub or gallop.    RESP: Respirations are unlabored. Clear to auscultation bilaterally without wheezing, rales, or rhonchi.  GI: Abdomen soft, nontender.  EXTREM: No LE edema.  NEURO: Alert and oriented, cooperative.  SKIN: Warm and dry.        Data:     LIPID RESULTS:  Lab Results   Component Value Date    CHOL 134 06/14/2021    HDL 53 06/14/2021    LDL 67 06/14/2021    TRIG 71 06/14/2021     LIVER ENZYME RESULTS:  Lab Results   Component Value Date    AST 27 04/04/2019    ALT 22 06/14/2021     CBC RESULTS:  Lab Results   Component Value Date    WBC 5.3 06/14/2021    RBC 4.33 06/14/2021    HGB 12.8 06/14/2021    HCT 40.1 06/14/2021    MCV 93 06/14/2021    MCH 29.6 06/14/2021    MCHC 31.9 06/14/2021    RDW 13.5 06/14/2021     06/14/2021     BMP RESULTS:  Lab Results   Component Value Date     06/23/2021    POTASSIUM 4.5 06/23/2021    CHLORIDE 104 06/23/2021    CO2 31 06/23/2021    ANIONGAP 3 " 06/23/2021    GLC 86 06/23/2021    BUN 30 06/23/2021    CR 1.29 (H) 06/23/2021    GFRESTIMATED 39 (L) 06/23/2021    GFRESTBLACK 46 (L) 06/23/2021    BESS 9.2 06/23/2021      A1C RESULTS:  Lab Results   Component Value Date    A1C 5.3 01/04/2019     INR RESULTS:  Lab Results   Component Value Date    INR 2.40 (H) 06/10/2021    INR 2.30 (H) 05/20/2021            Medications     Current Outpatient Medications   Medication Sig Dispense Refill     Acetaminophen 325 MG CAPS Take 325-650 mg by mouth every 4 hours as needed       alendronate (FOSAMAX) 70 MG tablet Take 1 tablet (70 mg) by mouth every 7 days 12 tablet 3     aspirin (ASA) 81 MG EC tablet Take 81 mg by mouth every 24 hours       cholecalciferol (VITAMIN D-1000 MAX ST) 1000 units TABS Take 1,000 Units by mouth daily        cyanocobalamin (VITAMIN B-12) 100 MCG tablet Take 1,000 mcg by mouth daily        escitalopram (LEXAPRO) 10 MG tablet Take 1 tablet (10 mg) by mouth daily 90 tablet 3     metoprolol tartrate (LOPRESSOR) 25 MG tablet Take 1 tablet (25 mg) by mouth 2 times daily 180 tablet 3     mirtazapine (REMERON) 15 MG tablet Take 1 tablet (15 mg) by mouth At Bedtime 90 tablet 3     torsemide (DEMADEX) 10 MG tablet Take 3 tablets (30 mg) by mouth daily 180 tablet 1     warfarin ANTICOAGULANT (COUMADIN) 2 MG tablet Take 2-3 tablets (4-6 mg) by mouth daily Take 3 tablets (6 mg) every Mon; Take 2 tablets (4mg) all other days. Adjust dose per INR result as directed 200 tablet 1          Past Medical History     Past Medical History:   Diagnosis Date     Benign essential hypertension      Chronic atrial fibrillation (H)     s/p AV node ablation January, 2019     Chronic diastolic heart failure (H)      Chronic kidney disease, stage III (moderate)      Past Surgical History:   Procedure Laterality Date     ANGIOGRAM Right 01/04/2019    Procedure: DIAGONSTIC ANGIOGRAM OF SMA;  Surgeon: Rigo Jennings MD;  Location: [facility] OR     BYPASS GRAFT ARTERY CORONARY,  REPAIR VALVE AORTIC, COMBINED N/A 01/04/2019    Procedure: AORTIC DISSECTION REPAIR WITH GRAFT- HEMASHIELD PLATINUM WOVEN DOUBLE VELOR 4 SIDE ARM VASCULAR GRAFT, D:35 X 12 X 10 X 10MM/ L:50CM;  Surgeon: Jose Winslow MD;  Location:  OR     EP ABLATION AV NODE N/A 01/11/2019    Procedure: EP Ablation AV Node;  Surgeon: Tsering Davey MD;  Location:  HEART CARDIAC CATH LAB     EP PACEMAKER Left 01/11/2019    Procedure: EP Pacemaker;  Surgeon: Tsering Davey MD;  Location:  HEART CARDIAC CATH LAB     THYROID SURGERY      benign mass removed     TRACHEOSTOMY N/A 01/25/2019    Procedure: TRACHEOSTOMY  (DR MCCLELLAND);  Surgeon: Bryson Mcclelland MD;  Location:  OR     Family History   Problem Relation Age of Onset     Diabetes No family hx of      Myocardial Infarction No family hx of      Cerebrovascular Disease No family hx of      Coronary Artery Disease Early Onset No family hx of      Breast Cancer No family hx of      Colon Cancer No family hx of      Ovarian Cancer No family hx of             Allergies   Amoxicillin and Ciprofloxacin    30 minutes spent on the date of the encounter doing chart review, history and exam, documentation and further activities as noted above      TAMRA Ryan [facility]  Pager: [phone]

## 2021-07-01 NOTE — PATIENT INSTRUCTIONS
1. No medication changes  2. Monitor diet and limit sodium intake  3. Monitor weight  4. Notify clinic of any changes or concerns  5. Follow up with Xochitl in 2 months with labs prior  6. Please call with any questions/concerns [phone]

## 2021-07-09 ENCOUNTER — ANTICOAGULATION THERAPY VISIT (OUTPATIENT)
Dept: NURSING | Facility: CLINIC | Age: 81
End: 2021-07-09

## 2021-07-09 DIAGNOSIS — I48.20 CHRONIC ATRIAL FIBRILLATION (H): ICD-10-CM

## 2021-07-09 DIAGNOSIS — I48.20 CHRONIC ATRIAL FIBRILLATION (H): Primary | ICD-10-CM

## 2021-07-09 LAB
CAPILLARY BLOOD COLLECTION: NORMAL
INR PPP: 2.3 (ref 0.86–1.14)

## 2021-07-09 PROCEDURE — 85610 PROTHROMBIN TIME: CPT | Performed by: INTERNAL MEDICINE

## 2021-07-09 PROCEDURE — 36416 COLLJ CAPILLARY BLOOD SPEC: CPT | Performed by: INTERNAL MEDICINE

## 2021-07-09 NOTE — PROGRESS NOTES
ANTICOAGULATION MANAGEMENT     Priya MARISOL Funez 80 year old female is on warfarin with therapeutic INR result. (Goal INR 2.0-3.0)    Recent labs: (last 7 days)     07/09/21  1451   INR 2.30*       ASSESSMENT     Source(s): Patient/Caregiver Call       Warfarin doses taken: Warfarin taken as instructed    Diet: No new diet changes identified    New illness, injury, or hospitalization: No    Medication/supplement changes: None noted    Signs or symptoms of bleeding or clotting: No    Previous INR: Therapeutic last 2(+) visits    Additional findings: None     PLAN     Recommended plan for no diet, medication or health factor changes affecting INR     Dosing Instructions: Continue your current warfarin dose with next INR in 5 weeks       Summary  As of 7/9/2021    Full warfarin instructions:  5 mg every Mon, Fri; 4 mg all other days   Next INR check:  8/12/2021             Telephone call with Priya who verbalizes understanding and agrees to plan    Lab visit scheduled    Education provided: Contact [phone]  with any changes, questions or concerns.     Plan made per [facility] anticoagulation protocol    Susie Orosco RN  Anticoagulation Clinic  7/9/2021    _______________________________________________________________________     Anticoagulation Episode Summary     Current INR goal:  2.0-3.0   TTR:  73.7 % (6.1 mo)   Target end date:  12/17/2021   Send INR reminders to:  [facility]    Indications    Chronic atrial fibrillation (H) [I48.20]  Current use of long term anticoagulation [Z79.01]           Comments:           Anticoagulation Care Providers     Provider Role Specialty Phone number    Sharmaine Burks MD Referring Internal Medicine [phone]

## 2021-08-12 ENCOUNTER — ANTICOAGULATION THERAPY VISIT (OUTPATIENT)
Dept: ANTICOAGULATION | Facility: CLINIC | Age: 81
End: 2021-08-12

## 2021-08-12 ENCOUNTER — LAB (OUTPATIENT)
Dept: LAB | Facility: CLINIC | Age: 81
End: 2021-08-12
Payer: COMMERCIAL

## 2021-08-12 DIAGNOSIS — I48.20 CHRONIC ATRIAL FIBRILLATION (H): ICD-10-CM

## 2021-08-12 DIAGNOSIS — Z79.01 CURRENT USE OF LONG TERM ANTICOAGULATION: ICD-10-CM

## 2021-08-12 DIAGNOSIS — I48.20 CHRONIC ATRIAL FIBRILLATION (H): Primary | ICD-10-CM

## 2021-08-12 LAB — INR BLD: 2.8 (ref 0.9–1.1)

## 2021-08-12 PROCEDURE — 85610 PROTHROMBIN TIME: CPT

## 2021-08-12 PROCEDURE — 36416 COLLJ CAPILLARY BLOOD SPEC: CPT

## 2021-08-12 PROCEDURE — 99207 PR NO CHARGE NURSE ONLY: CPT

## 2021-08-12 NOTE — PROGRESS NOTES
ANTICOAGULATION MANAGEMENT     Priya Funez 80 year old female is on warfarin with therapeutic INR result. (Goal INR 2.0-3.0)    Recent labs: (last 7 days)     08/12/21  1513   INR 2.8*       ASSESSMENT     Source(s): Chart Review and Patient/Caregiver Call       Warfarin doses taken: Warfarin taken as instructed    Diet: No new diet changes identified    New illness, injury, or hospitalization: No    Medication/supplement changes: None noted    Signs or symptoms of bleeding or clotting: No    Previous INR: Therapeutic last 2(+) visits    Additional findings: None     PLAN     Recommended plan for no diet, medication or health factor changes affecting INR     Dosing Instructions: Continue your current warfarin dose with next INR in 4 weeks       Summary  As of 8/12/2021    Full warfarin instructions:  5 mg every Mon, Fri; 4 mg all other days   Next INR check:               Telephone call with   Raphael who verbalizes understanding and agrees to plan    Lab visit scheduled    Education provided: Written instructions provided    Plan made per [facility] anticoagulation protocol    Christine Adames RN  Anticoagulation Clinic  8/12/2021    _______________________________________________________________________     Anticoagulation Episode Summary     Current INR goal:  2.0-3.0   TTR:  77.8 % (7.3 mo)   Target end date:  12/17/2021   Send INR reminders to:  [facility]    Indications    Chronic atrial fibrillation (H) [I48.20]  Current use of long term anticoagulation [Z79.01]           Comments:           Anticoagulation Care Providers     Provider Role Specialty Phone number    Sharmaine Burks MD Referring Internal Medicine [phone]

## 2021-08-12 NOTE — PROGRESS NOTES
ANTICOAGULATION FOLLOW-UP CLINIC VISIT    Patient Name:  Priya Funez  Date:  2021  Contact Type:  Telephone    SUBJECTIVE:         OBJECTIVE    Recent labs: (last 7 days)     21  1513   INR 2.8*       ASSESSMENT / PLAN  INR assessment THER    Recheck INR In: 4 WEEKS    INR Location Clinic      Anticoagulation Summary  As of 2021    INR goal:  2.0-3.0   TTR:  77.8 % (7.3 mo)   INR used for dosin.8 (2021)   Warfarin maintenance plan:  5 mg (2 mg x 2.5) every Mon, Fri; 4 mg (2 mg x 2) all other days   Full warfarin instructions:  5 mg every Mon, Fri; 4 mg all other days   Weekly warfarin total:  30 mg   No change documented:  Christine Adames RN   Plan last modified:  Christine Adames RN (2021)   Next INR check:     Priority:  Maintenance   Target end date:  2021    Indications    Chronic atrial fibrillation (H) [I48.20]  Current use of long term anticoagulation [Z79.01]             Anticoagulation Episode Summary     INR check location:      Preferred lab:      Send INR reminders to:  [facility]    Comments:        Anticoagulation Care Providers     Provider Role Specialty Phone number    Sharmaine Burks MD Referring Internal Medicine [phone]            See the Encounter Report to view Anticoagulation Flowsheet and Dosing Calendar (Go to Encounters tab in chart review, and find the Anticoagulation Therapy Visit)        Christine Adames RN

## 2021-08-31 ENCOUNTER — ANCILLARY PROCEDURE (OUTPATIENT)
Dept: MAMMOGRAPHY | Facility: CLINIC | Age: 81
End: 2021-08-31
Attending: INTERNAL MEDICINE
Payer: COMMERCIAL

## 2021-08-31 ENCOUNTER — OFFICE VISIT (OUTPATIENT)
Dept: INTERNAL MEDICINE | Facility: CLINIC | Age: 81
End: 2021-08-31
Payer: COMMERCIAL

## 2021-08-31 VITALS
HEIGHT: 64 IN | TEMPERATURE: 96.4 F | OXYGEN SATURATION: 97 % | BODY MASS INDEX: 26.63 KG/M2 | DIASTOLIC BLOOD PRESSURE: 76 MMHG | HEART RATE: 87 BPM | WEIGHT: 156 LBS | SYSTOLIC BLOOD PRESSURE: 122 MMHG | RESPIRATION RATE: 20 BRPM

## 2021-08-31 DIAGNOSIS — Z12.31 ENCOUNTER FOR SCREENING MAMMOGRAM FOR BREAST CANCER: ICD-10-CM

## 2021-08-31 DIAGNOSIS — I50.32 CHRONIC DIASTOLIC CONGESTIVE HEART FAILURE (H): ICD-10-CM

## 2021-08-31 DIAGNOSIS — G47.9 TROUBLE IN SLEEPING: ICD-10-CM

## 2021-08-31 DIAGNOSIS — I48.19 PERSISTENT ATRIAL FIBRILLATION (H): ICD-10-CM

## 2021-08-31 DIAGNOSIS — F41.1 GAD (GENERALIZED ANXIETY DISORDER): ICD-10-CM

## 2021-08-31 DIAGNOSIS — Z00.00 MEDICARE ANNUAL WELLNESS VISIT, SUBSEQUENT: Primary | ICD-10-CM

## 2021-08-31 DIAGNOSIS — I10 BENIGN ESSENTIAL HYPERTENSION: ICD-10-CM

## 2021-08-31 DIAGNOSIS — Z12.31 VISIT FOR SCREENING MAMMOGRAM: ICD-10-CM

## 2021-08-31 PROCEDURE — 99397 PER PM REEVAL EST PAT 65+ YR: CPT | Performed by: INTERNAL MEDICINE

## 2021-08-31 PROCEDURE — 77067 SCR MAMMO BI INCL CAD: CPT | Mod: TC | Performed by: RADIOLOGY

## 2021-08-31 PROCEDURE — 77063 BREAST TOMOSYNTHESIS BI: CPT | Mod: TC | Performed by: RADIOLOGY

## 2021-08-31 RX ORDER — MIRTAZAPINE 15 MG/1
15 TABLET, FILM COATED ORAL AT BEDTIME
Qty: 90 TABLET | Refills: 3 | Status: SHIPPED | OUTPATIENT
Start: 2021-08-31 | End: 2022-09-21

## 2021-08-31 RX ORDER — TORSEMIDE 10 MG/1
30 TABLET ORAL DAILY
Qty: 180 TABLET | Refills: 3 | Status: SHIPPED | OUTPATIENT
Start: 2021-08-31 | End: 2022-07-25

## 2021-08-31 RX ORDER — ESCITALOPRAM OXALATE 10 MG/1
10 TABLET ORAL DAILY
Qty: 90 TABLET | Refills: 3 | Status: SHIPPED | OUTPATIENT
Start: 2021-08-31 | End: 2022-10-05

## 2021-08-31 RX ORDER — METOPROLOL TARTRATE 25 MG/1
25 TABLET, FILM COATED ORAL 2 TIMES DAILY
Qty: 180 TABLET | Refills: 3 | Status: SHIPPED | OUTPATIENT
Start: 2021-08-31 | End: 2022-09-09

## 2021-08-31 ASSESSMENT — MIFFLIN-ST. JEOR: SCORE: 1164.67

## 2021-08-31 NOTE — PROGRESS NOTES
ASSESSMENT/PLAN                                                       (Z00.00) Medicare annual wellness visit, subsequent  (primary encounter diagnosis)  Comment: PMH, PSH, FH, SH, medications, allergies, immunizations, and preventative health measures reviewed and updated as appropriate.  Plan: see below for plans.      (I50.32) Chronic diastolic congestive heart failure (H)  Comment: well-controlled on current regimen.    Plan: continue present management; refills provided.     (G47.9) Trouble in sleeping  Comment: well-controlled on current regimen.    Plan: continue present management; refills provided.     (I10) Benign essential hypertension  Comment: well-controlled on current regimen.    Plan: continue present management; refills provided.     (I48.19) Persistent atrial fibrillation (H)  Comment: well-controlled on current regimen.    Plan: continue present management; refills provided.     (F41.1) NEDA (generalized anxiety disorder)  Comment: well-controlled on current regimen.    Plan: continue present management; refills provided.     (Z12.31) Encounter for screening mammogram for breast cancer  Plan: screening mammogram ordered - patient to schedule.     Appropriate preventive services were discussed with this patient, including applicable screening as appropriate for cardiovascular disease, diabetes, osteopenia/osteoporosis, and glaucoma.  As appropriate for age/gender, discussed screening for colorectal cancer, prostate cancer, breast cancer, and cervical cancer. Checklist reviewing preventive services available has been given to the patient.    Reviewed patients plan of care. The Basic Care Plan (routine screening as documented in Health Maintenance) for Priya Funez meets the Care Plan requirement. This Care Plan has been established and reviewed with the Patient.    Sharmaine Burks MD   [address] [address]  T: [phone], F:  [phone]    SUBJECTIVE                                                      Priya Funez is a very pleasant 80 year old female who presents for her subsequent AWV:    Current providers (other than myself): Sundar (cardiology)    PMH, PSH, , , medications, allergies, immunizations, preventative health, and health risk assessment reviewed and updated as appropriate.    Past Medical History:   Diagnosis Date     Benign essential hypertension      Chronic atrial fibrillation (H)     s/p AV node ablation January, 2019     Chronic diastolic heart failure (H)      Chronic kidney disease, stage III (moderate)      Past Surgical History:   Procedure Laterality Date     ANGIOGRAM Right 01/04/2019    Procedure: DIAGONSTIC ANGIOGRAM OF SMA;  Surgeon: Rigo Jennings MD;  Location:  OR     BIOPSY BREAST       BYPASS GRAFT ARTERY CORONARY, REPAIR VALVE AORTIC, COMBINED N/A 01/04/2019    Procedure: AORTIC DISSECTION REPAIR WITH GRAFT- HEMASHIELD PLATINUM WOVEN DOUBLE VELOR 4 SIDE ARM VASCULAR GRAFT, D:35 X 12 X 10 X 10MM/ L:50CM;  Surgeon: Jose Winslow MD;  Location:  OR     EP ABLATION AV NODE N/A 01/11/2019    Procedure: EP Ablation AV Node;  Surgeon: Tsering Davey MD;  Location:  HEART CARDIAC CATH LAB     EP PACEMAKER Left 01/11/2019    Procedure: EP Pacemaker;  Surgeon: Tsering Davey MD;  Location:  HEART CARDIAC CATH LAB     THYROID SURGERY      benign mass removed     TRACHEOSTOMY N/A 01/25/2019    Procedure: TRACHEOSTOMY  (DR MCCLELLAND);  Surgeon: Bryson Mcclelland MD;  Location:  OR     TUBAL LIGATION       Family History   Problem Relation Age of Onset     Diabetes No family hx of      Myocardial Infarction No family hx of      Cerebrovascular Disease No family hx of      Coronary Artery Disease Early Onset No family hx of      Breast Cancer No family hx of      Colon Cancer No family hx of      Ovarian Cancer No family hx of      Ataxia Mother      Heart Disease Father       Social History     Occupational History     Occupation: Retired - customer service   Tobacco Use     Smoking status: Never Smoker     Smokeless tobacco: Never Used   Substance and Sexual Activity     Alcohol use: Not Currently     Drug use: No     Sexual activity: Not on file   Social History Narrative    .    Lives in home with . Fully independent.    4 adult children.    8 grandchildren.     Allergies   Allergen Reactions     Amoxicillin Swelling     Face and body     Ciprofloxacin Hives     Current Outpatient Medications   Medication Sig     Acetaminophen 325 MG CAPS Take 325-650 mg by mouth every 4 hours as needed     aspirin (ASA) 81 MG EC tablet Take 81 mg by mouth every 24 hours     cholecalciferol (VITAMIN D-1000 MAX ST) 1000 units TABS Take 1,000 Units by mouth daily      cyanocobalamin (VITAMIN B-12) 100 MCG tablet Take 1,000 mcg by mouth daily      escitalopram (LEXAPRO) 10 MG tablet Take 1 tablet (10 mg) by mouth daily     metoprolol tartrate (LOPRESSOR) 25 MG tablet Take 1 tablet (25 mg) by mouth 2 times daily     mirtazapine (REMERON) 15 MG tablet Take 1 tablet (15 mg) by mouth At Bedtime     torsemide (DEMADEX) 10 MG tablet Take 3 tablets (30 mg) by mouth daily     warfarin ANTICOAGULANT (COUMADIN) 2 MG tablet Take 2-3 tablets (4-6 mg) by mouth daily Take 3 tablets (6 mg) every Mon; Take 2 tablets (4mg) all other days. Adjust dose per INR result as directed     Immunization History   Administered Date(s) Administered     COVID-19,WALE,Pfizer 01/30/2021, 02/20/2021     FLU 6-35 months 01/10/2013     FLUAD -HD 65+ QUAD ([facility] ONLY) 10/15/2020     Flu 65+ Years 10/18/2019     Pneumo Conj 13-V (2010&after) 02/22/2017     Pneumococcal 23 valent 05/01/2009     TDAP Vaccine (Adacel) 12/15/2014     Tdap (Adacel,Boostrix) 12/15/2014     PREVENTATIVE HEALTH                                                      BMI: within normal limits   Blood pressure: well-controlled on current regimen  "  Breast CA screening: DUE  Colon CA screening: screening no longer indicated  Lung CA screening: n/a   Dexa: DUE - patient declines  Screening cholesterol: up to date   Screening diabetes: up to date   Alcohol misuse screening: alcohol use reviewed - no intervention indicated at this time  Immunizations: reviewed; Shingrix series DUE - not covered by Medicare (may obtain in pharmacy if desired)     HEALTH RISK ASSESSMENT                                                      In general, how would you rate your overall physical health? good  Outside of work, how many days during the week do you exercise? 4-5 days/week  Outside of work, approximately how many minutes a day do you exercise? 15-30 minutes    If you drink alcohol do you typically have >3 drinks per day or >7 drinks per week? No  Do you usually eat at least 4 servings of fruit and vegetables a day, include whole grains & fiber and avoid regularly eating high fat or \"junk\" foods? Yes     Do you have any problems taking medications regularly? No  Do you have any side effects from medications? No    Assistance with daily activities: No    Safety concerns: No    Fall risk assessment: completed today (see ambulatory assessments)    Hearing concerns: No    In the past 6 months, have you been bothered by leaking of urine: Yes    In general, how would you rate your overall mental or emotional health: good    PHQ-2/PHQ-9 assessment: completed today (see ambulatory assessments)    Additional concerns today: No    OBJECTIVE                                                      /76 (BP Location: Left arm, Patient Position: Chair, Cuff Size: Adult Regular)   Pulse 87   Temp (!) 96.4  F (35.8  C) (Temporal)   Resp 20   Ht 1.613 m (5' 3.5\")   Wt 70.8 kg (156 lb)   SpO2 97%   BMI 27.20 kg/m    Constitutional: well-appearing  Head, Ears, and Eyes: normocephalic; normal external auditory canal and pinna; tympanic membranes visualized and normal; normal lids and " conjunctivae  Neck: supple, symmetric, no thyromegaly or lymphadenopathy  Respiratory: normal respiratory effort; clear to auscultation bilaterally  Cardiovascular: regular rate and rhythm; no edema  Gastrointestinal: soft, non-tender, and non-distended; no organomegaly or masses  Musculoskeletal: walks with walker  Psych: normal judgment and insight; normal mood and affect; recent and remote memory intact    Cognitive impairment noted: No  ---  (Note was completed, in part, with [employer] voice-recognition software. Documentation was reviewed, but some grammatical, spelling, and word errors may remain.)

## 2021-09-01 ENCOUNTER — LAB (OUTPATIENT)
Dept: LAB | Facility: CLINIC | Age: 81
End: 2021-09-01
Payer: COMMERCIAL

## 2021-09-01 DIAGNOSIS — I50.32 CHRONIC DIASTOLIC CONGESTIVE HEART FAILURE (H): ICD-10-CM

## 2021-09-01 LAB
ANION GAP SERPL CALCULATED.3IONS-SCNC: 1 MMOL/L (ref 3–14)
BUN SERPL-MCNC: 28 MG/DL (ref 7–30)
CALCIUM SERPL-MCNC: 9 MG/DL (ref 8.5–10.1)
CHLORIDE BLD-SCNC: 104 MMOL/L (ref 94–109)
CO2 SERPL-SCNC: 34 MMOL/L (ref 20–32)
CREAT SERPL-MCNC: 1.27 MG/DL (ref 0.52–1.04)
GFR SERPL CREATININE-BSD FRML MDRD: 41 ML/MIN/1.73M2
GLUCOSE BLD-MCNC: 84 MG/DL (ref 70–99)
POTASSIUM BLD-SCNC: 4.2 MMOL/L (ref 3.4–5.3)
SODIUM SERPL-SCNC: 139 MMOL/L (ref 133–144)

## 2021-09-01 PROCEDURE — 36415 COLL VENOUS BLD VENIPUNCTURE: CPT

## 2021-09-01 PROCEDURE — 80048 BASIC METABOLIC PNL TOTAL CA: CPT

## 2021-09-02 ENCOUNTER — OFFICE VISIT (OUTPATIENT)
Dept: CARDIOLOGY | Facility: CLINIC | Age: 81
End: 2021-09-02
Payer: COMMERCIAL

## 2021-09-02 VITALS
DIASTOLIC BLOOD PRESSURE: 82 MMHG | HEART RATE: 84 BPM | BODY MASS INDEX: 27.79 KG/M2 | OXYGEN SATURATION: 98 % | WEIGHT: 162.8 LBS | SYSTOLIC BLOOD PRESSURE: 128 MMHG | HEIGHT: 64 IN

## 2021-09-02 DIAGNOSIS — I50.32 CHRONIC DIASTOLIC CONGESTIVE HEART FAILURE (H): ICD-10-CM

## 2021-09-02 PROCEDURE — 99215 OFFICE O/P EST HI 40 MIN: CPT | Performed by: NURSE PRACTITIONER

## 2021-09-02 ASSESSMENT — MIFFLIN-ST. JEOR: SCORE: 1195.52

## 2021-09-02 NOTE — LETTER
9/2/2021    Sharmaine Burks MD  [address] [facility] [address]    RE: Priya Funez       Dear Colleague,    I had the pleasure of seeing Priya Funez in the [facility].    Cardiology Clinic Progress Note  Priya Funez MRN# [id number]   YOB: 1940 Age: 80 year old   Primary Cardiologist: Dr. Kwok Reason for visit: CORE follow up             Assessment and Plan:   Priya Funez is a very pleasant 80 year old female with a history of HFpEF, atrial fibrillation s/p AV leo ablation with PPM implantation 1/11/2019, emergent aortic dissection repair 1/4/19, hypertension and obesity.      1.  Chronic diastolic heart failure/HFpEF/right heart failure :  LVEF 60-65%, LV normal size/function, RV mild to moderately dilated, RV systolic function mildly decreased.               - NYHA class III, stage C              - Fluid status : close to euvolemic/mildly volume up              - Diuretic regimen : torsemide to 30mg daily              - Aldosterone antagonist : none, could consider in future.               - Blood pressure : controlled              - Reinforced HF education provided today, reviewed low sodium diet and when to notify CORE clinic.  2. Chronic atrial fibrillation s/p AV node ablation 1/2019 - stable, asymptomatic. Most recent device interrogation was stable in June 2021.               - Continue routine follow up with EP and device clinic              - [id number] score 3 (age, female, HF)              - Continue warfarin for thromboembolic prophylaxis  3. Emergent aortic dissection repair 1/4/19              - Repeat CT 6 months after surgery for surveillance completed 8/7/2019 which was stable, showing similar appearance as post repair.   4. Hypertension - controlled, continue current medications  5. Obesity - counseled patient on weight loss strategies.  6. Mild mitral regurgitation  7. Moderate to  severe tricuspid regurgitation    I saw Priya for CORE follow up today. She continues to do well from a heart failure standpoint, still with occasional orthopnea/PND which she correlates to after eating higher sodium foods. Her dietary indiscretions with sodium continue to contribute to the difficulties with volume management. Since our last visit she has stopped taking torsemide 30mg daily, noting she takes 30mg daily about 5 days a week and 20mg daily on other days. We extensively reviewed this today, I recommended on days when she is more symptomatic (coughing, orthopnea, PND) to take an additional 10mg torsemide for a total of 40mg. I noted she could take this additional 10mg in the evening.     Encouraged to call with any questions/concerns, otherwise follow up in 3 months with BMP prior.        Changes today: none, advised to take additional 10mg torsemide PRN for worsening symptoms for a total of 40mg of torsemide.     Follow up plan:     CORE follow up with me in 3 months with labs prior        History of Presenting Illness:    Priya Funez is a very pleasant 80 year old female with a history of HFpEF, atrial fibrillation s/p AV leo ablation with PPM implantation 1/11/2019, emergent aortic dissection repair 1/4/19, hypertension and obesity.      Patient presented in January with dissecting aortic aneurysm, she was hospitalized from 1/4/19-1/29/19. Patient had a complicated hospital course. There was prolonged ischemia to the SMA and right renal regions. She required tracheostomy placement and was discharged to [place] on ventilatory support. Patients tracheostomy has been discontinued and she transitioned home the beginning of June.      Post hospitalization she was evaluated by Dr. Morales in July 2019 at which point patient was noted to be volume up on exam. Recommended increasing her torsemide to 20mg BID. Lymphedema clinic referral placed and CORE consult ordered. I first met patient for CORE  "enrollment 7/9/19. She has been following closely in CORE clinic since.      Echocardiogram 6/26/19 showed LVEF 60-65%, LV normal size/function, RV mild to moderately dilated, RV systolic function mildly decreased, mild MR, moderate tricuspid regurgitation.  She underwent surveillance CT on 8/7/2019 for aortic dissection repair which was stable, showing similar appearance as post repair.      Fall 2020 she was having complaints of worsening dyspnea and cough, her torsemide was increased to 20mg daily which resulted in improved symptoms.      This spring she established care with Dr. Kwok in the setting of Dr. Morales's [facility] at which time she was doing well. No medications were changed.      Echocardiogram 6/14/21 showed no change when compared to prior, LVEF 60-65%, RV moderately dilated with mildly reduced RV systolic function. Mild mitral regurgitation. Moderate to severe tricuspid regurgitation.      Earlier this summer I increased her torsemide to 30mg daily (she did not want higher dosage). This resulted in 1-2# weight loss but her dyspnea was about the same. She continues to have significant dietary indiscretions with sodium which continue to contribute. During our last follow up in July she wished to continue torsemide 30mg daily, declined further titration and to focus on monitoring her diet closely.     Patient is here today for CORE follow up. Daughter present at visit today.     Patient reports feeling good. Monitoring weights daily a home, \"up and down\", states typically ~ 160#. Notes if she limits her salt it can help a lot. Notes she has been taking torsemide 30mg daily 5 days a week, noting she adjusted this dosing herself. Denies lower extremity edema. Denies shortness of breath at rest. Denies exertional dyspnea with routine activities, notes her fatigue limits activity more than her breathing. Occasional orthopnea/PND.     Patient denies chest pain or chest tightness. Denies dizziness, " "lightheadedness or other presyncopal symptoms. Denies tachycardia or palpitations. Denies episodes of bleeding. Taking medications daily.     Labs from 9/1 show stable kidney function and electrolytes. Blood pressure 128/82 and HR 84 in clinic today.    Appetite good, \"too good\". Eating meals at home and meals out. Trying to limit sodium intake. No set exercise routine, [place] with her , walking 15 mins. Also getting some exercise with watering her flowers. Denies alcohol use. Occasional small glass of wine.         Social History    , 4 children, 8 grandchildren, enjoys her gardens in the summer.   Social History     Socioeconomic History     Marital status:      Spouse name: Not on file     Number of children: Not on file     Years of education: Not on file     Highest education level: Not on file   Occupational History     Occupation: Retired - customer service   Tobacco Use     Smoking status: Never Smoker     Smokeless tobacco: Never Used   Substance and Sexual Activity     Alcohol use: Not Currently     Drug use: No     Sexual activity: Not on file   Other Topics Concern     Parent/sibling w/ CABG, MI or angioplasty before 65F 55M? Not Asked   Social History Narrative    .    Lives in home with . Fully independent.    4 adult children.    8 grandchildren.     Social Determinants of Health     Financial Resource Strain:      Difficulty of Paying Living Expenses:    Food Insecurity:      Worried About Running Out of Food in the Last Year:      Ran Out of Food in the Last Year:    Transportation Needs:      Lack of Transportation (Medical):      Lack of Transportation (Non-Medical):    Physical Activity:      Days of Exercise per Week:      Minutes of Exercise per Session:    Stress:      Feeling of Stress :    Social Connections:      Frequency of Communication with Friends and Family:      Frequency of Social Gatherings with Friends and " "Family:      Attends [employer] Services:      Active Member of Clubs or Organizations:      Attends Club or Organization Meetings:      Marital Status:    Intimate Partner Violence:      Fear of Current or Ex-Partner:      Emotionally Abused:      Physically Abused:      Sexually Abused:             Review of Systems:   Skin:  Negative     Eyes:  Positive for glasses  ENT:  Negative    Respiratory:  Positive for dyspnea on exertion;shortness of breath;mucoid expectorant;cough  Cardiovascular:  Negative Positive for;fatigue  Gastroenterology: Negative melena;hematochezia  Genitourinary:  Positive for urinary frequency  Musculoskeletal:  Positive for arthritis;joint pain  Neurologic:  Negative    Psychiatric:  Positive for anxiety;depression  Heme/Lymph/Imm:  Positive for allergies  Endocrine:  Negative           Physical Exam:   Vitals: Ht 1.613 m (5' 3.5\")   Wt 73.8 kg (162 lb 12.8 oz)   BMI 28.39 kg/m     Wt Readings from Last 4 Encounters:   09/02/21 73.8 kg (162 lb 12.8 oz)   08/31/21 70.8 kg (156 lb)   07/01/21 72.2 kg (159 lb 3.2 oz)   06/15/21 73 kg (161 lb)     GEN: well nourished, in no acute distress.  HEENT:  Pupils equal, round. Sclerae nonicteric.   NECK: Supple, no masses appreciated. No JVD on exam.   C/V:  Regular rate and rhythm, no murmur, rub or gallop.    RESP: Respirations are unlabored. Clear to auscultation bilaterally without wheezing, rales, or rhonchi.  GI: Abdomen soft, nontender.  EXTREM: No LE edema.  NEURO: Alert and oriented, cooperative.  SKIN: Warm and dry.        Data:     LIPID RESULTS:  Lab Results   Component Value Date    CHOL 134 06/14/2021    HDL 53 06/14/2021    LDL 67 06/14/2021    TRIG 71 06/14/2021     LIVER ENZYME RESULTS:  Lab Results   Component Value Date    AST 27 04/04/2019    ALT 22 06/14/2021     CBC RESULTS:  Lab Results   Component Value Date    WBC 5.3 06/14/2021    RBC 4.33 06/14/2021    HGB 12.8 06/14/2021    HCT 40.1 06/14/2021    MCV 93 06/14/2021    MCH " 29.6 06/14/2021    MCHC 31.9 06/14/2021    RDW 13.5 06/14/2021     06/14/2021     BMP RESULTS:  Lab Results   Component Value Date     09/01/2021     06/23/2021    POTASSIUM 4.2 09/01/2021    POTASSIUM 4.5 06/23/2021    CHLORIDE 104 09/01/2021    CHLORIDE 104 06/23/2021    CO2 34 (H) 09/01/2021    CO2 31 06/23/2021    ANIONGAP 1 (L) 09/01/2021    ANIONGAP 3 06/23/2021    GLC 84 09/01/2021    GLC 86 06/23/2021    BUN 28 09/01/2021    BUN 30 06/23/2021    CR 1.27 (H) 09/01/2021    CR 1.29 (H) 06/23/2021    GFRESTIMATED 41 (L) 09/01/2021    GFRESTIMATED 39 (L) 06/23/2021    GFRESTBLACK 46 (L) 06/23/2021    BESS 9.0 09/01/2021    BESS 9.2 06/23/2021      A1C RESULTS:  Lab Results   Component Value Date    A1C 5.6 01/30/2019    A1C 5.3 01/04/2019     INR RESULTS:  Lab Results   Component Value Date    INR 2.8 (H) 08/12/2021    INR 2.30 (H) 07/09/2021    INR 2.40 (H) 06/10/2021            Medications     Current Outpatient Medications   Medication Sig Dispense Refill     Acetaminophen 325 MG CAPS Take 325-650 mg by mouth every 4 hours as needed       aspirin (ASA) 81 MG EC tablet Take 81 mg by mouth every 24 hours       cholecalciferol (VITAMIN D-1000 MAX ST) 1000 units TABS Take 1,000 Units by mouth daily        cyanocobalamin (VITAMIN B-12) 100 MCG tablet Take 1,000 mcg by mouth daily        escitalopram (LEXAPRO) 10 MG tablet Take 1 tablet (10 mg) by mouth daily 90 tablet 3     metoprolol tartrate (LOPRESSOR) 25 MG tablet Take 1 tablet (25 mg) by mouth 2 times daily 180 tablet 3     mirtazapine (REMERON) 15 MG tablet Take 1 tablet (15 mg) by mouth At Bedtime 90 tablet 3     torsemide (DEMADEX) 10 MG tablet Take 3 tablets (30 mg) by mouth daily 180 tablet 3     warfarin ANTICOAGULANT (COUMADIN) 2 MG tablet Take 2-3 tablets (4-6 mg) by mouth daily Take 3 tablets (6 mg) every Mon; Take 2 tablets (4mg) all other days. Adjust dose per INR result as directed 200 tablet 1          Past Medical History      Past Medical History:   Diagnosis Date     Benign essential hypertension      Chronic atrial fibrillation (H)     s/p AV node ablation January, 2019     Chronic diastolic heart failure (H)      Chronic kidney disease, stage III (moderate)      Past Surgical History:   Procedure Laterality Date     ANGIOGRAM Right 01/04/2019    Procedure: DIAGONSTIC ANGIOGRAM OF SMA;  Surgeon: Rigo Jennings MD;  Location:  OR     BIOPSY BREAST       BYPASS GRAFT ARTERY CORONARY, REPAIR VALVE AORTIC, COMBINED N/A 01/04/2019    Procedure: AORTIC DISSECTION REPAIR WITH GRAFT- HEMASHIELD PLATINUM WOVEN DOUBLE VELOR 4 SIDE ARM VASCULAR GRAFT, D:35 X 12 X 10 X 10MM/ L:50CM;  Surgeon: Jose Winslow MD;  Location:  OR     EP ABLATION AV NODE N/A 01/11/2019    Procedure: EP Ablation AV Node;  Surgeon: Tsering Davey MD;  Location:  HEART CARDIAC CATH LAB     EP PACEMAKER Left 01/11/2019    Procedure: EP Pacemaker;  Surgeon: Tsering Davey MD;  Location:  HEART CARDIAC CATH LAB     THYROID SURGERY      benign mass removed     TRACHEOSTOMY N/A 01/25/2019    Procedure: TRACHEOSTOMY  (DR MCCLELLAND);  Surgeon: Bryson Mcclelland MD;  Location:  OR     TUBAL LIGATION       Family History   Problem Relation Age of Onset     Diabetes No family hx of      Myocardial Infarction No family hx of      Cerebrovascular Disease No family hx of      Coronary Artery Disease Early Onset No family hx of      Breast Cancer No family hx of      Colon Cancer No family hx of      Ovarian Cancer No family hx of      Ataxia Mother      Heart Disease Father             Allergies   Amoxicillin and Ciprofloxacin    40 minutes spent on the date of the encounter doing chart review, history and exam, documentation and further activities as noted above      TAMRA Ryan [facility]  Pager: [phone]        Thank you for allowing me to participate in the care of your patient.      Sincerely,     Xochitl  TAMRA Pruitt [facility]  cc:   No referring provider defined for this encounter.

## 2021-09-02 NOTE — PROGRESS NOTES
Cardiology Clinic Progress Note  Priya Funez MRN# [id number]   YOB: 1940 Age: 80 year old   Primary Cardiologist: Dr. Kwok Reason for visit: CORE follow up             Assessment and Plan:   Priya Funez is a very pleasant 80 year old female with a history of HFpEF, atrial fibrillation s/p AV leo ablation with PPM implantation 1/11/2019, emergent aortic dissection repair 1/4/19, hypertension and obesity.      1.  Chronic diastolic heart failure/HFpEF/right heart failure :  LVEF 60-65%, LV normal size/function, RV mild to moderately dilated, RV systolic function mildly decreased.               - NYHA class III, stage C              - Fluid status : close to euvolemic/mildly volume up              - Diuretic regimen : torsemide to 30mg daily              - Aldosterone antagonist : none, could consider in future.               - Blood pressure : controlled              - Reinforced HF education provided today, reviewed low sodium diet and when to notify CORE clinic.  2. Chronic atrial fibrillation s/p AV node ablation 1/2019 - stable, asymptomatic. Most recent device interrogation was stable in June 2021.               - Continue routine follow up with EP and device clinic              - [id number] score 3 (age, female, HF)              - Continue warfarin for thromboembolic prophylaxis  3. Emergent aortic dissection repair 1/4/19              - Repeat CT 6 months after surgery for surveillance completed 8/7/2019 which was stable, showing similar appearance as post repair.   4. Hypertension - controlled, continue current medications  5. Obesity - counseled patient on weight loss strategies.  6. Mild mitral regurgitation  7. Moderate to severe tricuspid regurgitation    I saw Priya for CORE follow up today. She continues to do well from a heart failure standpoint, still with occasional orthopnea/PND which she correlates to after eating higher sodium foods. Her dietary indiscretions  with sodium continue to contribute to the difficulties with volume management. Since our last visit she has stopped taking torsemide 30mg daily, noting she takes 30mg daily about 5 days a week and 20mg daily on other days. We extensively reviewed this today, I recommended on days when she is more symptomatic (coughing, orthopnea, PND) to take an additional 10mg torsemide for a total of 40mg. I noted she could take this additional 10mg in the evening.     Encouraged to call with any questions/concerns, otherwise follow up in 3 months with BMP prior.        Changes today: none, advised to take additional 10mg torsemide PRN for worsening symptoms for a total of 40mg of torsemide.     Follow up plan:     CORE follow up with me in 3 months with labs prior        History of Presenting Illness:    Priya Funez is a very pleasant 80 year old female with a history of HFpEF, atrial fibrillation s/p AV leo ablation with PPM implantation 1/11/2019, emergent aortic dissection repair 1/4/19, hypertension and obesity.      Patient presented in January with dissecting aortic aneurysm, she was hospitalized from 1/4/19-1/29/19. Patient had a complicated hospital course. There was prolonged ischemia to the SMA and right renal regions. She required tracheostomy placement and was discharged to [facility] on ventilatory support. Patients tracheostomy has been discontinued and she transitioned home the beginning of June.      Post hospitalization she was evaluated by Dr. Morales in July 2019 at which point patient was noted to be volume up on exam. Recommended increasing her torsemide to 20mg BID. Lymphedema clinic referral placed and CORE consult ordered. I first met patient for CORE enrollment 7/9/19. She has been following closely in CORE clinic since.      Echocardiogram 6/26/19 showed LVEF 60-65%, LV normal size/function, RV mild to moderately dilated, RV systolic function mildly decreased, mild MR, moderate tricuspid  "regurgitation.  She underwent surveillance CT on 8/7/2019 for aortic dissection repair which was stable, showing similar appearance as post repair.      Fall 2020 she was having complaints of worsening dyspnea and cough, her torsemide was increased to 20mg daily which resulted in improved symptoms.      This spring she established care with Dr. Kwok in the setting of [facility] at which time she was doing well. No medications were changed.      Echocardiogram 6/14/21 showed no change when compared to prior, LVEF 60-65%, RV moderately dilated with mildly reduced RV systolic function. Mild mitral regurgitation. Moderate to severe tricuspid regurgitation.      Earlier this summer I increased her torsemide to 30mg daily (she did not want higher dosage). This resulted in 1-2# weight loss but her dyspnea was about the same. She continues to have significant dietary indiscretions with sodium which continue to contribute. During our last follow up in July she wished to continue torsemide 30mg daily, declined further titration and to focus on monitoring her diet closely.     Patient is here today for CORE follow up. Daughter present at visit today.     Patient reports feeling good. Monitoring weights daily a home, \"up and down\", states typically ~ 160#. Notes if she limits her salt it can help a lot. Notes she has been taking torsemide 30mg daily 5 days a week, noting she adjusted this dosing herself. Denies lower extremity edema. Denies shortness of breath at rest. Denies exertional dyspnea with routine activities, notes her fatigue limits activity more than her breathing. Occasional orthopnea/PND.     Patient denies chest pain or chest tightness. Denies dizziness, lightheadedness or other presyncopal symptoms. Denies tachycardia or palpitations. Denies episodes of bleeding. Taking medications daily.     Labs from 9/1 show stable kidney function and electrolytes. Blood pressure 128/82 and HR 84 in clinic " "today.    Appetite good, \"too good\". Eating meals at home and meals out. Trying to limit sodium intake. No set exercise routine, [place] her , walking 15 mins. Also getting some exercise with watering her flowers. Denies alcohol use. Occasional small glass of wine.         Social History    , 4 children, 8 grandchildren, enjoys her gardens in the summer.   Social History     Socioeconomic History     Marital status:      Spouse name: Not on file     Number of children: Not on file     Years of education: Not on file     Highest education level: Not on file   Occupational History     Occupation: Retired - customer service   Tobacco Use     Smoking status: Never Smoker     Smokeless tobacco: Never Used   Substance and Sexual Activity     Alcohol use: Not Currently     Drug use: No     Sexual activity: Not on file   Other Topics Concern     Parent/sibling w/ CABG, MI or angioplasty before 65F 55M? Not Asked   Social History Narrative    .    Lives in home with . Fully independent.    4 adult children.    8 grandchildren.     Social Determinants of Health     Financial Resource Strain:      Difficulty of Paying Living Expenses:    Food Insecurity:      Worried About Running Out of Food in the Last Year:      Ran Out of Food in the Last Year:    Transportation Needs:      Lack of Transportation (Medical):      Lack of Transportation (Non-Medical):    Physical Activity:      Days of Exercise per Week:      Minutes of Exercise per Session:    Stress:      Feeling of Stress :    Social Connections:      Frequency of Communication with Friends and Family:      Frequency of Social Gatherings with Friends and Family:      Attends [employer] Services:      Active Member of Clubs or Organizations:      Attends Club or Organization Meetings:      Marital Status:    Intimate Partner Violence:      Fear of Current or Ex-Partner:      Emotionally Abused:  " "    Physically Abused:      Sexually Abused:             Review of Systems:   Skin:  Negative     Eyes:  Positive for glasses  ENT:  Negative    Respiratory:  Positive for dyspnea on exertion;shortness of breath;mucoid expectorant;cough  Cardiovascular:  Negative Positive for;fatigue  Gastroenterology: Negative melena;hematochezia  Genitourinary:  Positive for urinary frequency  Musculoskeletal:  Positive for arthritis;joint pain  Neurologic:  Negative    Psychiatric:  Positive for anxiety;depression  Heme/Lymph/Imm:  Positive for allergies  Endocrine:  Negative           Physical Exam:   Vitals: Ht 1.613 m (5' 3.5\")   Wt 73.8 kg (162 lb 12.8 oz)   BMI 28.39 kg/m     Wt Readings from Last 4 Encounters:   09/02/21 73.8 kg (162 lb 12.8 oz)   08/31/21 70.8 kg (156 lb)   07/01/21 72.2 kg (159 lb 3.2 oz)   06/15/21 73 kg (161 lb)     GEN: well nourished, in no acute distress.  HEENT:  Pupils equal, round. Sclerae nonicteric.   NECK: Supple, no masses appreciated. No JVD on exam.   C/V:  Regular rate and rhythm, no murmur, rub or gallop.    RESP: Respirations are unlabored. Clear to auscultation bilaterally without wheezing, rales, or rhonchi.  GI: Abdomen soft, nontender.  EXTREM: No LE edema.  NEURO: Alert and oriented, cooperative.  SKIN: Warm and dry.        Data:     LIPID RESULTS:  Lab Results   Component Value Date    CHOL 134 06/14/2021    HDL 53 06/14/2021    LDL 67 06/14/2021    TRIG 71 06/14/2021     LIVER ENZYME RESULTS:  Lab Results   Component Value Date    AST 27 04/04/2019    ALT 22 06/14/2021     CBC RESULTS:  Lab Results   Component Value Date    WBC 5.3 06/14/2021    RBC 4.33 06/14/2021    HGB 12.8 06/14/2021    HCT 40.1 06/14/2021    MCV 93 06/14/2021    MCH 29.6 06/14/2021    MCHC 31.9 06/14/2021    RDW 13.5 06/14/2021     06/14/2021     BMP RESULTS:  Lab Results   Component Value Date     09/01/2021     06/23/2021    POTASSIUM 4.2 09/01/2021    POTASSIUM 4.5 06/23/2021    " CHLORIDE 104 09/01/2021    CHLORIDE 104 06/23/2021    CO2 34 (H) 09/01/2021    CO2 31 06/23/2021    ANIONGAP 1 (L) 09/01/2021    ANIONGAP 3 06/23/2021    GLC 84 09/01/2021    GLC 86 06/23/2021    BUN 28 09/01/2021    BUN 30 06/23/2021    CR 1.27 (H) 09/01/2021    CR 1.29 (H) 06/23/2021    GFRESTIMATED 41 (L) 09/01/2021    GFRESTIMATED 39 (L) 06/23/2021    GFRESTBLACK 46 (L) 06/23/2021    BESS 9.0 09/01/2021    BESS 9.2 06/23/2021      A1C RESULTS:  Lab Results   Component Value Date    A1C 5.6 01/30/2019    A1C 5.3 01/04/2019     INR RESULTS:  Lab Results   Component Value Date    INR 2.8 (H) 08/12/2021    INR 2.30 (H) 07/09/2021    INR 2.40 (H) 06/10/2021            Medications     Current Outpatient Medications   Medication Sig Dispense Refill     Acetaminophen 325 MG CAPS Take 325-650 mg by mouth every 4 hours as needed       aspirin (ASA) 81 MG EC tablet Take 81 mg by mouth every 24 hours       cholecalciferol (VITAMIN D-1000 MAX ST) 1000 units TABS Take 1,000 Units by mouth daily        cyanocobalamin (VITAMIN B-12) 100 MCG tablet Take 1,000 mcg by mouth daily        escitalopram (LEXAPRO) 10 MG tablet Take 1 tablet (10 mg) by mouth daily 90 tablet 3     metoprolol tartrate (LOPRESSOR) 25 MG tablet Take 1 tablet (25 mg) by mouth 2 times daily 180 tablet 3     mirtazapine (REMERON) 15 MG tablet Take 1 tablet (15 mg) by mouth At Bedtime 90 tablet 3     torsemide (DEMADEX) 10 MG tablet Take 3 tablets (30 mg) by mouth daily 180 tablet 3     warfarin ANTICOAGULANT (COUMADIN) 2 MG tablet Take 2-3 tablets (4-6 mg) by mouth daily Take 3 tablets (6 mg) every Mon; Take 2 tablets (4mg) all other days. Adjust dose per INR result as directed 200 tablet 1          Past Medical History     Past Medical History:   Diagnosis Date     Benign essential hypertension      Chronic atrial fibrillation (H)     s/p AV node ablation January, 2019     Chronic diastolic heart failure (H)      Chronic kidney disease, stage III (moderate)       Past Surgical History:   Procedure Laterality Date     ANGIOGRAM Right 01/04/2019    Procedure: DIAGONSTIC ANGIOGRAM OF SMA;  Surgeon: Rigo Jennings MD;  Location:  OR     BIOPSY BREAST       BYPASS GRAFT ARTERY CORONARY, REPAIR VALVE AORTIC, COMBINED N/A 01/04/2019    Procedure: AORTIC DISSECTION REPAIR WITH GRAFT- HEMASHIELD PLATINUM WOVEN DOUBLE VELOR 4 SIDE ARM VASCULAR GRAFT, D:35 X 12 X 10 X 10MM/ L:50CM;  Surgeon: Jose Winslow MD;  Location:  OR     EP ABLATION AV NODE N/A 01/11/2019    Procedure: EP Ablation AV Node;  Surgeon: Tsering Davey MD;  Location:  HEART CARDIAC CATH LAB     EP PACEMAKER Left 01/11/2019    Procedure: EP Pacemaker;  Surgeon: Tsering Davey MD;  Location:  HEART CARDIAC CATH LAB     THYROID SURGERY      benign mass removed     TRACHEOSTOMY N/A 01/25/2019    Procedure: TRACHEOSTOMY  (DR MCCLELLAND);  Surgeon: Bryson Mcclelland MD;  Location:  OR     TUBAL LIGATION       Family History   Problem Relation Age of Onset     Diabetes No family hx of      Myocardial Infarction No family hx of      Cerebrovascular Disease No family hx of      Coronary Artery Disease Early Onset No family hx of      Breast Cancer No family hx of      Colon Cancer No family hx of      Ovarian Cancer No family hx of      Ataxia Mother      Heart Disease Father             Allergies   Amoxicillin and Ciprofloxacin    40 minutes spent on the date of the encounter doing chart review, history and exam, documentation and further activities as noted above      TAMRA [facility]  Pager: [phone]

## 2021-09-02 NOTE — PATIENT INSTRUCTIONS
1. No medication changes  2. If breathing is worse, take an extra 10mg torsemide for a total of 40mg as needed.   3. Continue to monitor weight and breathing, notify clinic of changes.   4. Please call with any questions/concerns [phone]  5. Follow up with Xochitl in 3 months with labs prior

## 2021-09-09 ENCOUNTER — LAB (OUTPATIENT)
Dept: LAB | Facility: CLINIC | Age: 81
End: 2021-09-09
Payer: COMMERCIAL

## 2021-09-09 ENCOUNTER — ANTICOAGULATION THERAPY VISIT (OUTPATIENT)
Dept: NURSING | Facility: CLINIC | Age: 81
End: 2021-09-09

## 2021-09-09 DIAGNOSIS — I48.20 CHRONIC ATRIAL FIBRILLATION (H): ICD-10-CM

## 2021-09-09 LAB — INR BLD: 3.3 (ref 0.9–1.1)

## 2021-09-09 PROCEDURE — 36416 COLLJ CAPILLARY BLOOD SPEC: CPT

## 2021-09-09 PROCEDURE — 85610 PROTHROMBIN TIME: CPT

## 2021-09-09 NOTE — PROGRESS NOTES
ANTICOAGULATION MANAGEMENT     Priya DAMON Armin 80 year old female is on warfarin with supratherapeutic INR result. (Goal INR 2.0-3.0)    Recent labs: (last 7 days)     09/09/21  1524   INR 3.3*       ASSESSMENT     Source(s): Chart Review and Patient/Caregiver Call       Warfarin doses taken: Warfarin taken as instructed    Diet: No new diet changes identified    New illness, injury, or hospitalization: No    Medication/supplement changes: None noted    Signs or symptoms of bleeding or clotting: No    Previous INR: Therapeutic last 2(+) visits    Additional findings: None     PLAN     Recommended plan for no diet, medication or health factor changes affecting INR     Dosing Instructions: Partial hold then continue your current warfarin dose with next INR in 2 weeks       Summary  As of 9/9/2021    Full warfarin instructions:  9/9: 2 mg; Otherwise 5 mg every Mon, Fri; 4 mg all other days   Next INR check:               Telephone call with Priya who agrees to plan and repeated back plan correctly    Lab visit scheduled    Education provided: Importance of notifying clinic for changes in medications; a sooner lab recheck maybe needed. and Contact [phone]  with any changes, questions or concerns.     Plan made per [facility] anticoagulation protocol    Susie Orosco RN  Anticoagulation Clinic  9/9/2021    _______________________________________________________________________     Anticoagulation Episode Summary     Current INR goal:  2.0-3.0   TTR:  73.5 % (8.2 mo)   Target end date:  12/17/2021   Send INR reminders to:  [facility]    Indications    Chronic atrial fibrillation (H) [I48.20]  Current use of long term anticoagulation [Z79.01]           Comments:           Anticoagulation Care Providers     Provider Role Specialty Phone number    Sharmaine Burks MD Referring Internal Medicine [phone]

## 2021-09-10 ENCOUNTER — ANCILLARY PROCEDURE (OUTPATIENT)
Dept: CARDIOLOGY | Facility: CLINIC | Age: 81
End: 2021-09-10
Attending: INTERNAL MEDICINE
Payer: COMMERCIAL

## 2021-09-10 DIAGNOSIS — Z98.890 HX OF ATRIOVENTRICULAR NODE ABLATION: ICD-10-CM

## 2021-09-10 DIAGNOSIS — Z95.0 CARDIAC PACEMAKER IN SITU: ICD-10-CM

## 2021-09-10 DIAGNOSIS — I44.2 COMPLETE HEART BLOCK (H): Primary | ICD-10-CM

## 2021-09-10 PROCEDURE — 93280 PM DEVICE PROGR EVAL DUAL: CPT | Performed by: INTERNAL MEDICINE

## 2021-09-14 LAB
MDC_IDC_LEAD_IMPLANT_DT: NORMAL
MDC_IDC_LEAD_LOCATION: NORMAL
MDC_IDC_LEAD_LOCATION_DETAIL_1: NORMAL
MDC_IDC_LEAD_MFG: NORMAL
MDC_IDC_LEAD_MODEL: NORMAL
MDC_IDC_LEAD_POLARITY_TYPE: NORMAL
MDC_IDC_LEAD_SERIAL: NORMAL
MDC_IDC_MSMT_BATTERY_DTM: NORMAL
MDC_IDC_MSMT_BATTERY_REMAINING_LONGEVITY: 131 MO
MDC_IDC_MSMT_BATTERY_RRT_TRIGGER: 2.62
MDC_IDC_MSMT_BATTERY_STATUS: NORMAL
MDC_IDC_MSMT_BATTERY_VOLTAGE: 2.96 V
MDC_IDC_MSMT_LEADCHNL_RV_IMPEDANCE_VALUE: 418 OHM
MDC_IDC_MSMT_LEADCHNL_RV_IMPEDANCE_VALUE: 475 OHM
MDC_IDC_MSMT_LEADCHNL_RV_PACING_THRESHOLD_AMPLITUDE: 0.5 V
MDC_IDC_MSMT_LEADCHNL_RV_PACING_THRESHOLD_PULSEWIDTH: 0.4 MS
MDC_IDC_MSMT_LEADCHNL_RV_SENSING_INTR_AMPL: 7.12 MV
MDC_IDC_MSMT_LEADCHNL_RV_SENSING_INTR_AMPL: 7.12 MV
MDC_IDC_PG_IMPLANT_DTM: NORMAL
MDC_IDC_PG_MFG: NORMAL
MDC_IDC_PG_MODEL: NORMAL
MDC_IDC_PG_SERIAL: NORMAL
MDC_IDC_PG_TYPE: NORMAL
MDC_IDC_SESS_CLINIC_NAME: NORMAL
MDC_IDC_SESS_DTM: NORMAL
MDC_IDC_SESS_TYPE: NORMAL
MDC_IDC_SET_BRADY_HYSTRATE: NORMAL
MDC_IDC_SET_BRADY_LOWRATE: 60 {BEATS}/MIN
MDC_IDC_SET_BRADY_MAX_SENSOR_RATE: 120 {BEATS}/MIN
MDC_IDC_SET_BRADY_MODE: NORMAL
MDC_IDC_SET_LEADCHNL_RV_PACING_AMPLITUDE: 2 V
MDC_IDC_SET_LEADCHNL_RV_PACING_ANODE_ELECTRODE_1: NORMAL
MDC_IDC_SET_LEADCHNL_RV_PACING_ANODE_LOCATION_1: NORMAL
MDC_IDC_SET_LEADCHNL_RV_PACING_CAPTURE_MODE: NORMAL
MDC_IDC_SET_LEADCHNL_RV_PACING_CATHODE_ELECTRODE_1: NORMAL
MDC_IDC_SET_LEADCHNL_RV_PACING_CATHODE_LOCATION_1: NORMAL
MDC_IDC_SET_LEADCHNL_RV_PACING_POLARITY: NORMAL
MDC_IDC_SET_LEADCHNL_RV_PACING_PULSEWIDTH: 0.4 MS
MDC_IDC_SET_LEADCHNL_RV_SENSING_ANODE_ELECTRODE_1: NORMAL
MDC_IDC_SET_LEADCHNL_RV_SENSING_ANODE_LOCATION_1: NORMAL
MDC_IDC_SET_LEADCHNL_RV_SENSING_CATHODE_ELECTRODE_1: NORMAL
MDC_IDC_SET_LEADCHNL_RV_SENSING_CATHODE_LOCATION_1: NORMAL
MDC_IDC_SET_LEADCHNL_RV_SENSING_POLARITY: NORMAL
MDC_IDC_SET_LEADCHNL_RV_SENSING_SENSITIVITY: 0.9 MV
MDC_IDC_SET_ZONE_DETECTION_INTERVAL: 360 MS
MDC_IDC_SET_ZONE_TYPE: NORMAL
MDC_IDC_STAT_BRADY_DTM_END: NORMAL
MDC_IDC_STAT_BRADY_DTM_START: NORMAL
MDC_IDC_STAT_BRADY_RV_PERCENT_PACED: 99.99 %
MDC_IDC_STAT_EPISODE_RECENT_COUNT: 0
MDC_IDC_STAT_EPISODE_RECENT_COUNT: 0
MDC_IDC_STAT_EPISODE_RECENT_COUNT_DTM_END: NORMAL
MDC_IDC_STAT_EPISODE_RECENT_COUNT_DTM_END: NORMAL
MDC_IDC_STAT_EPISODE_RECENT_COUNT_DTM_START: NORMAL
MDC_IDC_STAT_EPISODE_RECENT_COUNT_DTM_START: NORMAL
MDC_IDC_STAT_EPISODE_TOTAL_COUNT: 0
MDC_IDC_STAT_EPISODE_TOTAL_COUNT: 0
MDC_IDC_STAT_EPISODE_TOTAL_COUNT_DTM_END: NORMAL
MDC_IDC_STAT_EPISODE_TOTAL_COUNT_DTM_END: NORMAL
MDC_IDC_STAT_EPISODE_TOTAL_COUNT_DTM_START: NORMAL
MDC_IDC_STAT_EPISODE_TOTAL_COUNT_DTM_START: NORMAL
MDC_IDC_STAT_EPISODE_TYPE: NORMAL

## 2021-09-23 ENCOUNTER — ANTICOAGULATION THERAPY VISIT (OUTPATIENT)
Dept: ANTICOAGULATION | Facility: CLINIC | Age: 81
End: 2021-09-23

## 2021-09-23 ENCOUNTER — LAB (OUTPATIENT)
Dept: LAB | Facility: CLINIC | Age: 81
End: 2021-09-23
Payer: COMMERCIAL

## 2021-09-23 DIAGNOSIS — Z79.01 CURRENT USE OF LONG TERM ANTICOAGULATION: ICD-10-CM

## 2021-09-23 DIAGNOSIS — I48.20 CHRONIC ATRIAL FIBRILLATION (H): Primary | ICD-10-CM

## 2021-09-23 DIAGNOSIS — I48.20 CHRONIC ATRIAL FIBRILLATION (H): ICD-10-CM

## 2021-09-23 LAB — INR BLD: 2.9 (ref 0.9–1.1)

## 2021-09-23 PROCEDURE — 85610 PROTHROMBIN TIME: CPT

## 2021-09-23 PROCEDURE — 36416 COLLJ CAPILLARY BLOOD SPEC: CPT

## 2021-09-23 NOTE — PROGRESS NOTES
ANTICOAGULATION MANAGEMENT     Priya Funez 80 year old female is on warfarin with therapeutic INR result. (Goal INR 2.0-3.0)    Recent labs: (last 7 days)     09/23/21  1530   INR 2.9*       ASSESSMENT     Source(s): Patient/Caregiver Call       Warfarin doses taken: Warfarin taken as instructed    Diet: No new diet changes identified    New illness, injury, or hospitalization: No    Medication/supplement changes: None noted    Signs or symptoms of bleeding or clotting: No    Previous INR: Supratherapeutic    Additional findings: None     PLAN     Recommended plan for no diet, medication or health factor changes affecting INR     Dosing Instructions: Continue your current warfarin dose with next INR in 4 weeks       Summary  As of 9/23/2021    Full warfarin instructions:  5 mg every Mon, Fri; 4 mg all other days   Next INR check:  10/21/2021             Telephone call with Priya who verbalizes understanding and agrees to plan    Lab visit scheduled    Education provided: Monitoring for bleeding signs and symptoms and Monitoring for clotting signs and symptoms    Plan made per [facility] anticoagulation protocol    Kay Campbell RN  Anticoagulation Clinic  9/23/2021    _______________________________________________________________________     Anticoagulation Episode Summary     Current INR goal:  2.0-3.0   TTR:  70.9 % (8.7 mo)   Target end date:  12/17/2021   Send INR reminders to:  [facility]    Indications    Chronic atrial fibrillation (H) [I48.20]  Current use of long term anticoagulation [Z79.01]           Comments:           Anticoagulation Care Providers     Provider Role Specialty Phone number    Sharmaine Burks MD Referring Internal Medicine [phone]

## 2021-09-30 DIAGNOSIS — I48.20 CHRONIC ATRIAL FIBRILLATION (H): ICD-10-CM

## 2021-09-30 RX ORDER — WARFARIN SODIUM 2 MG/1
TABLET ORAL
Qty: 200 TABLET | Refills: 0 | Status: SHIPPED | OUTPATIENT
Start: 2021-09-30 | End: 2022-01-11

## 2021-10-21 ENCOUNTER — LAB (OUTPATIENT)
Dept: LAB | Facility: CLINIC | Age: 81
End: 2021-10-21
Payer: COMMERCIAL

## 2021-10-21 ENCOUNTER — ANTICOAGULATION THERAPY VISIT (OUTPATIENT)
Dept: ANTICOAGULATION | Facility: CLINIC | Age: 81
End: 2021-10-21

## 2021-10-21 DIAGNOSIS — Z79.01 CURRENT USE OF LONG TERM ANTICOAGULATION: ICD-10-CM

## 2021-10-21 DIAGNOSIS — I48.20 CHRONIC ATRIAL FIBRILLATION (H): Primary | ICD-10-CM

## 2021-10-21 DIAGNOSIS — I48.20 CHRONIC ATRIAL FIBRILLATION (H): ICD-10-CM

## 2021-10-21 LAB — INR BLD: 1.9 (ref 0.9–1.1)

## 2021-10-21 PROCEDURE — 85610 PROTHROMBIN TIME: CPT

## 2021-10-21 PROCEDURE — 99207 PR NO CHARGE NURSE ONLY: CPT

## 2021-10-21 PROCEDURE — 36416 COLLJ CAPILLARY BLOOD SPEC: CPT

## 2021-10-21 NOTE — PROGRESS NOTES
ANTICOAGULATION MANAGEMENT     Priya Funez 81 year old female is on warfarin with subtherapeutic INR result. (Goal INR 2.0-3.0)    Recent labs: (last 7 days)     10/21/21  1524   INR 1.9*       ASSESSMENT     Source(s): Chart Review and Patient/Caregiver Call       Warfarin doses taken: Warfarin taken as instructed    Diet: Increased greens/vitamin K in diet; plans to resume previous intake    New illness, injury, or hospitalization: No    Medication/supplement changes: None noted    Signs or symptoms of bleeding or clotting: No    Previous INR: Therapeutic last visit; previously outside of goal range    Additional findings: None     PLAN     Recommended plan for no diet, medication or health factor changes affecting INR     Dosing Instructions: Booster dose then continue your current warfarin dose with next INR in 2 weeks       Summary  As of 10/21/2021    Full warfarin instructions:  10/21: 5 mg; Otherwise 5 mg every Mon, Fri; 4 mg all other days   Next INR check:  11/4/2021             Telephone call with Priya who verbalizes understanding and agrees to plan    Lab visit scheduled    Education provided: Please call back if any changes to your diet, medications or how you've been taking warfarin    Plan made per [facility] anticoagulation protocol    Christine Adames RN  Anticoagulation Clinic  10/21/2021    _______________________________________________________________________     Anticoagulation Episode Summary     Current INR goal:  2.0-3.0   TTR:  72.8 % (9.6 mo)   Target end date:  12/17/2021   [place]ers [place]  [facility]    Indications    Chronic atrial fibrillation (H) [I48.20]  Current use of long term anticoagulation [Z79.01]           Comments:           Anticoagulation Care Providers     Provider Role Specialty Phone number    Sharmaine Burks MD Referring Internal Medicine [phone]

## 2021-10-21 NOTE — PROGRESS NOTES
ANTICOAGULATION FOLLOW-UP CLINIC VISIT    Patient Name:  Priya Funez  Date:  10/21/2021  Contact Type:  Telephone    SUBJECTIVE:         OBJECTIVE    Recent labs: (last 7 days)     10/21/21  1524   INR 1.9*       ASSESSMENT / PLAN  INR assessment SUB    Recheck INR In: 2 WEEKS    INR Location Clinic      Anticoagulation Summary  As of 10/21/2021    INR goal:  2.0-3.0   TTR:  72.8 % (9.6 mo)   INR used for dosin.9 (10/21/2021)   Warfarin maintenance plan:  5 mg (2 mg x 2.5) every Mon, Fri; 4 mg (2 mg x 2) all other days   Full warfarin instructions:  10/21: 5 mg; Otherwise 5 mg every Mon, Fri; 4 mg all other days   Weekly warfarin total:  30 mg   Plan last modified:  Christine Adames RN (2021)   Next INR check:  2021   Priority:  Maintenance   Target end date:  2021    Indications    Chronic atrial fibrillation (H) [I48.20]  Current use of long term anticoagulation [Z79.01]             Anticoagulation Episode Summary     INR check location:      Preferred lab:      Send INR reminders to:  [facility]    Comments:        Anticoagulation Care Providers     Provider Role Specialty Phone number    Sharmaine Burks MD Referring Internal Medicine [phone]            See the Encounter Report to view Anticoagulation Flowsheet and Dosing Calendar (Go to Encounters tab in chart review, and find the Anticoagulation Therapy Visit)        Christine Adames RN

## 2021-11-04 ENCOUNTER — ANTICOAGULATION THERAPY VISIT (OUTPATIENT)
Dept: NURSING | Facility: CLINIC | Age: 81
End: 2021-11-04

## 2021-11-04 ENCOUNTER — LAB (OUTPATIENT)
Dept: LAB | Facility: CLINIC | Age: 81
End: 2021-11-04
Payer: COMMERCIAL

## 2021-11-04 ENCOUNTER — TELEPHONE (OUTPATIENT)
Dept: INTERNAL MEDICINE | Facility: CLINIC | Age: 81
End: 2021-11-04

## 2021-11-04 DIAGNOSIS — I48.20 CHRONIC ATRIAL FIBRILLATION (H): ICD-10-CM

## 2021-11-04 DIAGNOSIS — I48.20 CHRONIC ATRIAL FIBRILLATION (H): Primary | ICD-10-CM

## 2021-11-04 LAB — INR BLD: 2.4 (ref 0.9–1.1)

## 2021-11-04 PROCEDURE — 85610 PROTHROMBIN TIME: CPT

## 2021-11-04 PROCEDURE — 36416 COLLJ CAPILLARY BLOOD SPEC: CPT

## 2021-11-04 NOTE — TELEPHONE ENCOUNTER
ANTICOAGULATION MANAGEMENT      Priya Funez due for annual renewal of referral to anticoagulation monitoring. Order pended for your review and signature.      ANTICOAGULATION SUMMARY      Warfarin indication(s)     Chronic atrial fibrillation (H) [I48.20]  Current use of long term anticoagulation [Z79.01]       Heart valve present?  NO       Current goal range   INR: 2.0-3.0     Goal appropriate for indication? Yes, INR 2-3 appropriate for hx of DVT, PE, hypercoagulable state, Afib, LVAD, or bileaflet AVR without risk factors     Current duration of therapy Indefinite/long term therapy   Time in Therapeutic Range (TTR)  (Goal > 60%) 73.1%       Office visit with referring provider's group within last year yes on 8/31/21       Susie Orosco RN

## 2021-11-04 NOTE — PROGRESS NOTES
ANTICOAGULATION MANAGEMENT     Priya MARISOL Funez 81 year old female is on warfarin with therapeutic INR result. (Goal INR 2.0-3.0)    Recent labs: (last 7 days)     11/04/21  1423   INR 2.4*       ASSESSMENT     Source(s): Chart Review and Patient/Caregiver Call       Warfarin doses taken: Warfarin taken as instructed    Diet: No new diet changes identified    New illness, injury, or hospitalization: No    Medication/supplement changes: None noted    Signs or symptoms of bleeding or clotting: No    Previous INR: Subtherapeutic    Additional findings: None     PLAN     Recommended plan for no diet, medication or health factor changes affecting INR     Dosing Instructions: Continue your current warfarin dose with next INR in 4 weeks       Summary  As of 11/4/2021    Full warfarin instructions:  5 mg every Mon, Fri; 4 mg all other days   Next INR check:  12/2/2021             Telephone call with Priya who verbalizes understanding and agrees to plan    Lab visit scheduled    Education provided: Importance of notifying clinic for changes in medications; a sooner lab recheck maybe needed. and Contact [phone]  with any changes, questions or concerns.     Plan made per [facility] anticoagulation protocol    Susie Orosco RN  Anticoagulation Clinic  11/4/2021    _______________________________________________________________________     Anticoagulation Episode Summary     Current INR goal:  2.0-3.0   TTR:  73.1 % (10.1 mo)   Target end date:  12/17/2021   Send INR reminders to:  [facility]    Indications    Chronic atrial fibrillation (H) [I48.20]  Current use of long term anticoagulation [Z79.01]           Comments:           Anticoagulation Care Providers     Provider Role Specialty Phone number    Sharmaine Burks MD Referring Internal Medicine [phone]

## 2021-12-02 ENCOUNTER — LAB (OUTPATIENT)
Dept: LAB | Facility: CLINIC | Age: 81
End: 2021-12-02
Payer: COMMERCIAL

## 2021-12-02 ENCOUNTER — ANTICOAGULATION THERAPY VISIT (OUTPATIENT)
Dept: ANTICOAGULATION | Facility: CLINIC | Age: 81
End: 2021-12-02

## 2021-12-02 DIAGNOSIS — I48.20 CHRONIC ATRIAL FIBRILLATION (H): ICD-10-CM

## 2021-12-02 DIAGNOSIS — Z79.01 CURRENT USE OF LONG TERM ANTICOAGULATION: ICD-10-CM

## 2021-12-02 DIAGNOSIS — I48.20 CHRONIC ATRIAL FIBRILLATION (H): Primary | ICD-10-CM

## 2021-12-02 LAB — INR BLD: 3.3 (ref 0.9–1.1)

## 2021-12-02 PROCEDURE — 36416 COLLJ CAPILLARY BLOOD SPEC: CPT

## 2021-12-02 PROCEDURE — 99207 PR NO CHARGE NURSE ONLY: CPT

## 2021-12-02 PROCEDURE — 85610 PROTHROMBIN TIME: CPT

## 2021-12-03 ENCOUNTER — LAB (OUTPATIENT)
Dept: LAB | Facility: CLINIC | Age: 81
End: 2021-12-03
Payer: COMMERCIAL

## 2021-12-03 DIAGNOSIS — I50.32 CHRONIC DIASTOLIC CONGESTIVE HEART FAILURE (H): ICD-10-CM

## 2021-12-03 LAB
ANION GAP SERPL CALCULATED.3IONS-SCNC: 7 MMOL/L (ref 3–14)
BUN SERPL-MCNC: 29 MG/DL (ref 7–30)
CALCIUM SERPL-MCNC: 8.8 MG/DL (ref 8.5–10.1)
CHLORIDE BLD-SCNC: 103 MMOL/L (ref 94–109)
CO2 SERPL-SCNC: 30 MMOL/L (ref 20–32)
CREAT SERPL-MCNC: 1.19 MG/DL (ref 0.52–1.04)
GFR SERPL CREATININE-BSD FRML MDRD: 44 ML/MIN/1.73M2
GLUCOSE BLD-MCNC: 94 MG/DL (ref 70–99)
POTASSIUM BLD-SCNC: 4 MMOL/L (ref 3.4–5.3)
SODIUM SERPL-SCNC: 140 MMOL/L (ref 133–144)

## 2021-12-03 PROCEDURE — 80048 BASIC METABOLIC PNL TOTAL CA: CPT | Performed by: NURSE PRACTITIONER

## 2021-12-03 PROCEDURE — 36415 COLL VENOUS BLD VENIPUNCTURE: CPT | Performed by: NURSE PRACTITIONER

## 2021-12-03 NOTE — PROGRESS NOTES
ANTICOAGULATION FOLLOW-UP CLINIC VISIT    Patient Name:  Priya Funez  Date:  12/3/2021  Contact Type:  Telephone    SUBJECTIVE:         OBJECTIVE    Recent labs: (last 7 days)     12/02/21  1512   INR 3.3*       ASSESSMENT / PLAN  INR assessment SUPRA    Recheck INR In: 2 WEEKS    INR Location Clinic      Anticoagulation Summary  As of 12/2/2021    INR goal:  2.0-3.0   TTR:  72.6 % (11 mo)   INR used for dosing:  3.3 (12/2/2021)   Warfarin maintenance plan:  5 mg (2 mg x 2.5) every Mon, Fri; 4 mg (2 mg x 2) all other days   Full warfarin instructions:  5 mg every Mon, Fri; 4 mg all other days   Weekly warfarin total:  30 mg   No change documented:  Christine Adames RN   Plan last modified:  Christine Adames RN (4/8/2021)   Next INR check:  12/16/2021   Priority:  Maintenance   Target end date:  12/17/2021    Indications    Chronic atrial fibrillation (H) [I48.20]  Current use of long term anticoagulation [Z79.01]             Anticoagulation Episode Summary     INR check location:      Preferred lab:      Send INR reminders to:  [facility]    Comments:        Anticoagulation Care Providers     Provider Role Specialty Phone number    Sharmaine Burks MD Referring Internal Medicine [phone]            See the Encounter Report to view Anticoagulation Flowsheet and Dosing Calendar (Go to Encounters tab in chart review, and find the Anticoagulation Therapy Visit)        Christine Adames RN          ANTICOAGULATION MANAGEMENT     Priya Funez 81 year old female is on warfarin with supratherapeutic INR result. (Goal INR 2.0-3.0)    Recent labs: (last 7 days)     12/02/21  1512   INR 3.3*       ASSESSMENT     Source(s): Chart Review and Patient/Caregiver Call       Warfarin doses taken: Warfarin taken as instructed    Diet: No new diet changes identified    New illness, injury, or hospitalization: No    Medication/supplement changes: None noted    Signs or symptoms of bleeding or  clotting: No    Previous INR: Therapeutic last visit; previously outside of goal range    Additional findings: None     PLAN     Recommended plan for no diet, medication or health factor changes affecting INR     Dosing Instructions: Continue your current warfarin dose with next INR in 2 weeks       Summary  As of 12/2/2021    Full warfarin instructions:  5 mg every Mon, Fri; 4 mg all other days   Next INR check:  12/16/2021             Telephone call with Priya who verbalizes understanding and agrees to plan    Lab visit scheduled    Education provided: Please call back if any changes to your diet, medications or how you've been taking warfarin and Resume manage by exception with home monitor. Continue to submit INR results to home monitor company.You will only be called when your result is out of range. Please call and notify [facility] if new medication started, dose missed, signs or symptoms of bleeding or clotting, or a surgery/procedure is scheduled.    Plan made per [facility] anticoagulation protocol    Christine Adames RN  Anticoagulation Clinic  12/3/2021    _______________________________________________________________________     Anticoagulation Episode Summary     Current INR goal:  2.0-3.0   TTR:  72.6 % (11 mo)   Target end date:  12/17/2021   Send INR reminders to:  [facility]    Indications    Chronic atrial fibrillation (H) [I48.20]  Current use of long term anticoagulation [Z79.01]           Comments:           Anticoagulation Care Providers     Provider Role Specialty Phone number    Sharmaine Burks MD Referring Internal Medicine [phone]

## 2021-12-06 ENCOUNTER — OFFICE VISIT (OUTPATIENT)
Dept: CARDIOLOGY | Facility: CLINIC | Age: 81
End: 2021-12-06
Attending: NURSE PRACTITIONER
Payer: COMMERCIAL

## 2021-12-06 VITALS
DIASTOLIC BLOOD PRESSURE: 83 MMHG | SYSTOLIC BLOOD PRESSURE: 123 MMHG | HEIGHT: 64 IN | OXYGEN SATURATION: 98 % | BODY MASS INDEX: 27.3 KG/M2 | WEIGHT: 159.9 LBS | HEART RATE: 86 BPM

## 2021-12-06 DIAGNOSIS — I50.32 CHRONIC DIASTOLIC CONGESTIVE HEART FAILURE (H): ICD-10-CM

## 2021-12-06 PROCEDURE — 99215 OFFICE O/P EST HI 40 MIN: CPT | Performed by: NURSE PRACTITIONER

## 2021-12-06 ASSESSMENT — MIFFLIN-ST. JEOR: SCORE: 1177.36

## 2021-12-06 NOTE — LETTER
12/6/2021    Sharmaine Burks MD  [address] [facility] [address]    RE: Priya Funez       Dear Colleague,    I had the pleasure of seeing Priya Funez in the [facility].  Cardiology Clinic Progress Note  Priya Funez MRN# [id number]   YOB: 1940 Age: 81 year old   Primary Cardiologist: Dr. Kwok  Reason for visit: CORE follow up             Assessment and Plan:   Priya Funez is a very pleasant 81 year old female with a history of HFpEF, atrial fibrillation s/p AV leo ablation with PPM implantation 1/11/2019, emergent aortic dissection repair 1/4/19, hypertension and obesity.      1.  Chronic diastolic heart failure/HFpEF/right heart failure :  LVEF 60-65%, LV normal size/function, RV mild to moderately dilated, RV systolic function mildly decreased.               - NYHA class III, stage C              - Fluid status : euvolemic              - Diuretic regimen : torsemide to 30mg 4-5 days per week and 20mg other days, based on symptoms per patient.               - Aldosterone antagonist : none, could consider in future.               - Blood pressure : controlled  2. Chronic atrial fibrillation s/p AV node ablation 1/2019 - stable, asymptomatic. Most recent device interrogation was stable in Sept 2021.               - Continue routine follow up with EP and device clinic              - [id number] score 3 (age, female, HF)              - Continue warfarin for thromboembolic prophylaxis  3. Emergent aortic dissection repair 1/4/19              - CT 6 months after surgery for surveillance completed 8/7/2019 which was stable, showing similar appearance as post repair.   4. Hypertension - controlled, continue current medications  5. Obesity - counseled patient on weight loss strategies.  6. Mild mitral regurgitation  7. Moderate to severe tricuspid regurgitation    I saw Priya today for CORE follow up. She is doing  well from a cardiovascular standpoint. Her weight is down a few pounds since this fall. She reports she has been more diligent about monitoring her sodium intake. Praise given. She appears compensated and euvolemic today. She has been taking torsemide 30mg 4 days per week and 20mg other days, she bases this on her symptoms. Okay for her to continue this regimen. Advised to call with any questions/concerns.     Changes today: none    Follow up plan:     Follow up with Dr. Kwok in the spring with labs prior.         History of Presenting Illness:    Priya Funez is a very pleasant 80 year old female with a history of HFpEF, atrial fibrillation s/p AV leo ablation with PPM implantation 1/11/2019, emergent aortic dissection repair 1/4/19, hypertension and obesity.      Patient presented in January with dissecting aortic aneurysm, she was hospitalized from 1/4/19-1/29/19. Patient had a complicated hospital course. There was prolonged ischemia to the SMA and right renal regions. She required tracheostomy placement and was discharged to [place] on ventilatory support. Patients tracheostomy has been discontinued and she transitioned home the beginning of June.      Post hospitalization she was evaluated by Dr. Morales in July 2019 at which point patient was noted to be volume up on exam. Recommended increasing her torsemide to 20mg BID. Lymphedema clinic referral placed and CORE consult ordered. I first met patient for CORE enrollment 7/9/19. She has been following closely in CORE clinic since.      Echocardiogram 6/26/19 showed LVEF 60-65%, LV normal size/function, RV mild to moderately dilated, RV systolic function mildly decreased, mild MR, moderate tricuspid regurgitation.  She underwent surveillance CT on 8/7/2019 for aortic dissection repair which was stable, showing similar appearance as post repair.      This spring she established care with Dr. Kwok in the setting of Dr. Morales's custodial at which  time she was doing well. No medications were changed.      Echocardiogram 6/14/21 showed no change when compared to prior, LVEF 60-65%, RV moderately dilated with mildly reduced RV systolic function. Mild mitral regurgitation. Moderate to severe tricuspid regurgitation.      I last saw her in September at which time she was doing well, she was having occasional orthopnea/PND which she correlated with eating higher sodium foods. She had self adjusting her diuretics to torsemide 30mg 5 days a week and 20mg on the other days. I recommended on days when she is more symptomatic (coughing, orthopnea, PND) to take an additional 10mg torsemide for a total of 40mg. I noted she could take this additional 10mg in the evening.     Patient is here today for CORE follow up.     Patient reports feeling good. Monitoring weights daily a home. Notes her weight has decreased a few pounds. Weight today in clinic 159# down from 162# 3 months ago. She is taking torsemide 30mg about 4 x a week and other days taking 20mg. Denies exertional dyspnea. Does occasionally note some shortness of breath in the evening. Denies orthopnea or PND. She will sometimes have a productive cough at night periodically. She can correlate that her symptoms and weight is better when she is diligent about monitoring her sodium intake. Denies chest pain or chest tightness. Denies dizziness, lightheadedness or other presyncopal symptoms. Denies tachycardia or palpitations.     Labs from 12/3 show stable kidney function and electrolytes, creatinine 1.19. Blood pressure 123/83 and HR 86 in clinic today.    Appetite good. Really trying to monitor her sodium intake. Going to the mall 3-4 days a week to walk, typically walking 15 mins. Denies tobacco use. Occasionally will share a little beer with her .         Social History    , 4 children, 8 grandchildren, enjoys her gardens in the summer.   Social History     Socioeconomic History     Marital status:  "     Spouse name: Not on file     Number of children: Not on file     Years of education: Not on file     Highest education level: Not on file   Occupational History     Occupation: Retired - customer service   Tobacco Use     Smoking status: Never Smoker     Smokeless tobacco: Never Used   Substance and Sexual Activity     Alcohol use: Not Currently     Drug use: No     Sexual activity: Not on file   Other Topics Concern     Parent/sibling w/ CABG, MI or angioplasty before 65F 55M? Not Asked   Social History Narrative    .    Lives in home with . Fully independent.    4 adult children.    8 grandchildren.     Social Determinants of Health     Financial Resource Strain: Not on file   Food Insecurity: Not on file   Transportation Needs: Not on file   Physical Activity: Not on file   Stress: Not on file   Social Connections: Not on file   Intimate Partner Violence: Not on file   Housing Stability: Not on file            Review of Systems:   Skin:  Negative     Eyes:  Positive for glasses  ENT:  Negative    Respiratory:  Positive for dyspnea on exertion;shortness of breath;mucoid expectorant;cough  Cardiovascular:  Negative Positive for;fatigue  Gastroenterology: Negative melena;hematochezia  Genitourinary:  Positive for urinary frequency  Musculoskeletal:  Positive for arthritis;joint pain  Neurologic:  Negative    Psychiatric:  Positive for anxiety;depression  Heme/Lymph/Imm:  Positive for allergies  Endocrine:  Negative           Physical Exam:   Vitals: /83   Pulse 86   Ht 1.613 m (5' 3.5\")   Wt 72.5 kg (159 lb 14.4 oz)   SpO2 98%   BMI 27.88 kg/m     Wt Readings from Last 4 Encounters:   12/06/21 72.5 kg (159 lb 14.4 oz)   09/02/21 73.8 kg (162 lb 12.8 oz)   08/31/21 70.8 kg (156 lb)   07/01/21 72.2 kg (159 lb 3.2 oz)     GEN: well nourished, in no acute distress.  HEENT:  Pupils equal, round. Sclerae nonicteric.   NECK: Supple, no masses appreciated.   C/V:  Regular rate and " rhythm  RESP: Respirations are unlabored. Clear to auscultation bilaterally without wheezing, rales, or rhonchi.  GI: Abdomen soft, nontender.  EXTREM: No LE edema.  NEURO: Alert and oriented, cooperative.  SKIN: Warm and dry.        Data:     LIPID RESULTS:  Lab Results   Component Value Date    CHOL 134 06/14/2021    HDL 53 06/14/2021    LDL 67 06/14/2021    TRIG 71 06/14/2021     LIVER ENZYME RESULTS:  Lab Results   Component Value Date    AST 27 04/04/2019    ALT 22 06/14/2021     CBC RESULTS:  Lab Results   Component Value Date    WBC 5.3 06/14/2021    RBC 4.33 06/14/2021    HGB 12.8 06/14/2021    HCT 40.1 06/14/2021    MCV 93 06/14/2021    MCH 29.6 06/14/2021    MCHC 31.9 06/14/2021    RDW 13.5 06/14/2021     06/14/2021     BMP RESULTS:  Lab Results   Component Value Date     12/03/2021     06/23/2021    POTASSIUM 4.0 12/03/2021    POTASSIUM 4.5 06/23/2021    CHLORIDE 103 12/03/2021    CHLORIDE 104 06/23/2021    CO2 30 12/03/2021    CO2 31 06/23/2021    ANIONGAP 7 12/03/2021    ANIONGAP 3 06/23/2021    GLC 94 12/03/2021    GLC 86 06/23/2021    BUN 29 12/03/2021    BUN 30 06/23/2021    CR 1.19 (H) 12/03/2021    CR 1.29 (H) 06/23/2021    GFRESTIMATED 44 (L) 12/03/2021    GFRESTIMATED 39 (L) 06/23/2021    GFRESTBLACK 46 (L) 06/23/2021    BESS 8.8 12/03/2021    BESS 9.2 06/23/2021      A1C RESULTS:  Lab Results   Component Value Date    A1C 5.6 01/30/2019    A1C 5.3 01/04/2019     INR RESULTS:  Lab Results   Component Value Date    INR 3.3 (H) 12/02/2021    INR 2.4 (H) 11/04/2021    INR 2.30 (H) 07/09/2021    INR 2.40 (H) 06/10/2021            Medications     Current Outpatient Medications   Medication Sig Dispense Refill     Acetaminophen 325 MG CAPS Take 325-650 mg by mouth every 4 hours as needed       aspirin (ASA) 81 MG EC tablet Take 81 mg by mouth every 24 hours       cholecalciferol (VITAMIN D-1000 MAX ST) 1000 units TABS Take 1,000 Units by mouth daily        cyanocobalamin (VITAMIN B-12)  100 MCG tablet Take 1,000 mcg by mouth daily        escitalopram (LEXAPRO) 10 MG tablet Take 1 tablet (10 mg) by mouth daily 90 tablet 3     metoprolol tartrate (LOPRESSOR) 25 MG tablet Take 1 tablet (25 mg) by mouth 2 times daily 180 tablet 3     mirtazapine (REMERON) 15 MG tablet Take 1 tablet (15 mg) by mouth At Bedtime 90 tablet 3     torsemide (DEMADEX) 10 MG tablet Take 3 tablets (30 mg) by mouth daily 180 tablet 3     warfarin ANTICOAGULANT (COUMADIN) 2 MG tablet Take 2.5 tablets (5 mg) on Mondays and Fridays and take 2 tablets (4 mg) all other days or as directed by the INR clinic 200 tablet 0          Past Medical History     Past Medical History:   Diagnosis Date     Benign essential hypertension      Chronic atrial fibrillation (H)     s/p AV node ablation January, 2019     Chronic diastolic heart failure (H)      Chronic kidney disease, stage III (moderate) (H)      Past Surgical History:   Procedure Laterality Date     ANGIOGRAM Right 01/04/2019    Procedure: DIAGONSTIC ANGIOGRAM OF SMA;  Surgeon: Rigo Jennings MD;  Location:  OR     BIOPSY BREAST       BYPASS GRAFT ARTERY CORONARY, REPAIR VALVE AORTIC, COMBINED N/A 01/04/2019    Procedure: AORTIC DISSECTION REPAIR WITH GRAFT- HEMASHIELD PLATINUM WOVEN DOUBLE VELOR 4 SIDE ARM VASCULAR GRAFT, D:35 X 12 X 10 X 10MM/ L:50CM;  Surgeon: Jose Winslow MD;  Location:  OR     EP ABLATION AV NODE N/A 01/11/2019    Procedure: EP Ablation AV Node;  Surgeon: Tsering Davey MD;  Location:  HEART CARDIAC CATH LAB     EP PACEMAKER Left 01/11/2019    Procedure: EP Pacemaker;  Surgeon: Tsering Davey MD;  Location:  HEART CARDIAC CATH LAB     THYROID SURGERY      benign mass removed     TRACHEOSTOMY N/A 01/25/2019    Procedure: TRACHEOSTOMY  (DR MCCLELLAND);  Surgeon: Bryson Mcclelland MD;  Location:  OR     TUBAL LIGATION       Family History   Problem Relation Age of Onset     Diabetes No family hx of      Myocardial Infarction No family hx  of      Cerebrovascular Disease No family hx of      Coronary Artery Disease Early Onset No family hx of      Breast Cancer No family hx of      Colon Cancer No family hx of      Ovarian Cancer No family hx of      Ataxia Mother      Heart Disease Father             Allergies   Amoxicillin and Ciprofloxacin    40 minutes spent on the date of the encounter doing chart review, history and exam, documentation and further activities as noted above      TAM[facility]  Pager: [phone]

## 2021-12-06 NOTE — PROGRESS NOTES
Cardiology Clinic Progress Note  Priya Funez MRN# [id number]   YOB: 1940 Age: 81 year old   Primary Cardiologist: Dr. Kwok  Reason for visit: CORE follow up             Assessment and Plan:   Priya Funez is a very pleasant 81 year old female with a history of HFpEF, atrial fibrillation s/p AV leo ablation with PPM implantation 1/11/2019, emergent aortic dissection repair 1/4/19, hypertension and obesity.      1.  Chronic diastolic heart failure/HFpEF/right heart failure :  LVEF 60-65%, LV normal size/function, RV mild to moderately dilated, RV systolic function mildly decreased.               - NYHA class III, stage C              - Fluid status : euvolemic              - Diuretic regimen : torsemide to 30mg 4-5 days per week and 20mg other days, based on symptoms per patient.               - Aldosterone antagonist : none, could consider in future.               - Blood pressure : controlled  2. Chronic atrial fibrillation s/p AV node ablation 1/2019 - stable, asymptomatic. Most recent device interrogation was stable in Sept 2021.               - Continue routine follow up with EP and device clinic              - [id number] score 3 (age, female, HF)              - Continue warfarin for thromboembolic prophylaxis  3. Emergent aortic dissection repair 1/4/19              - CT 6 months after surgery for surveillance completed 8/7/2019 which was stable, showing similar appearance as post repair.   4. Hypertension - controlled, continue current medications  5. Obesity - counseled patient on weight loss strategies.  6. Mild mitral regurgitation  7. Moderate to severe tricuspid regurgitation    I saw Priya today for CORE follow up. She is doing well from a cardiovascular standpoint. Her weight is down a few pounds since this fall. She reports she has been more diligent about monitoring her sodium intake. Praise given. She appears compensated and euvolemic today. She has been taking  torsemide 30mg 4 days per week and 20mg other days, she bases this on her symptoms. Okay for her to continue this regimen. Advised to call with any questions/concerns.     Changes today: none    Follow up plan:     Follow up with Dr. Kwok in the spring with labs prior.         History of Presenting Illness:    Priya Funez is a very pleasant 80 year old female with a history of HFpEF, atrial fibrillation s/p AV leo ablation with PPM implantation 1/11/2019, emergent aortic dissection repair 1/4/19, hypertension and obesity.      Patient presented in January with dissecting aortic aneurysm, she was hospitalized from 1/4/19-1/29/19. Patient had a complicated hospital course. There was prolonged ischemia to the SMA and right renal regions. She required tracheostomy placement and was discharged to [facility] on ventilatory support. Patients tracheostomy has been discontinued and she transitioned home the beginning of June.      Post hospitalization she was evaluated by Dr. Morales in July 2019 at which point patient was noted to be volume up on exam. Recommended increasing her torsemide to 20mg BID. Lymphedema clinic referral placed and CORE consult ordered. I first met patient for CORE enrollment 7/9/19. She has been following closely in CORE clinic since.      Echocardiogram 6/26/19 showed LVEF 60-65%, LV normal size/function, RV mild to moderately dilated, RV systolic function mildly decreased, mild MR, moderate tricuspid regurgitation.  She underwent surveillance CT on 8/7/2019 for aortic dissection repair which was stable, showing similar appearance as post repair.      This spring she established care with Dr. Kwok in the setting of Dr. Morales's care home at which time she was doing well. No medications were changed.      Echocardiogram 6/14/21 showed no change when compared to prior, LVEF 60-65%, RV moderately dilated with mildly reduced RV systolic function. Mild mitral regurgitation. Moderate to  severe tricuspid regurgitation.      I last saw her in September at which time she was doing well, she was having occasional orthopnea/PND which she correlated with eating higher sodium foods. She had self adjusting her diuretics to torsemide 30mg 5 days a week and 20mg on the other days. I recommended on days when she is more symptomatic (coughing, orthopnea, PND) to take an additional 10mg torsemide for a total of 40mg. I noted she could take this additional 10mg in the evening.     Patient is here today for CORE follow up.     Patient reports feeling good. Monitoring weights daily a home. Notes her weight has decreased a few pounds. Weight today in clinic 159# down from 162# 3 months ago. She is taking torsemide 30mg about 4 x a week and other days taking 20mg. Denies exertional dyspnea. Does occasionally note some shortness of breath in the evening. Denies orthopnea or PND. She will sometimes have a productive cough at night periodically. She can correlate that her symptoms and weight is better when she is diligent about monitoring her sodium intake. Denies chest pain or chest tightness. Denies dizziness, lightheadedness or other presyncopal symptoms. Denies tachycardia or palpitations.     Labs from 12/3 show stable kidney function and electrolytes, creatinine 1.19. Blood pressure 123/83 and HR 86 in clinic today.    Appetite good. Really trying to monitor her sodium intake. Going to the mall 3-4 days a week to walk, typically walking 15 mins. Denies tobacco use. Occasionally will share a little beer with her .         Social History    , 4 children, 8 grandchildren, enjoys her gardens in the summer.   Social History     Socioeconomic History     Marital status:      Spouse name: Not on file     Number of children: Not on file     Years of education: Not on file     Highest education level: Not on file   Occupational History     Occupation: Retired - customer service   Tobacco Use      "Smoking status: Never Smoker     Smokeless tobacco: Never Used   Substance and Sexual Activity     Alcohol use: Not Currently     Drug use: No     Sexual activity: Not on file   Other Topics Concern     Parent/sibling w/ CABG, MI or angioplasty before 65F 55M? Not Asked   Social History Narrative    .    Lives in home with . Fully independent.    4 adult children.    8 grandchildren.     Social Determinants of Health     Financial Resource Strain: Not on file   Food Insecurity: Not on file   Transportation Needs: Not on file   Physical Activity: Not on file   Stress: Not on file   Social Connections: Not on file   Intimate Partner Violence: Not on file   Housing Stability: Not on file            Review of Systems:   Skin:  Negative     Eyes:  Positive for glasses  ENT:  Negative    Respiratory:  Positive for dyspnea on exertion;shortness of breath;mucoid expectorant;cough  Cardiovascular:  Negative Positive for;fatigue  Gastroenterology: Negative melena;hematochezia  Genitourinary:  Positive for urinary frequency  Musculoskeletal:  Positive for arthritis;joint pain  Neurologic:  Negative    Psychiatric:  Positive for anxiety;depression  Heme/Lymph/Imm:  Positive for allergies  Endocrine:  Negative           Physical Exam:   Vitals: /83   Pulse 86   Ht 1.613 m (5' 3.5\")   Wt 72.5 kg (159 lb 14.4 oz)   SpO2 98%   BMI 27.88 kg/m     Wt Readings from Last 4 Encounters:   12/06/21 72.5 kg (159 lb 14.4 oz)   09/02/21 73.8 kg (162 lb 12.8 oz)   08/31/21 70.8 kg (156 lb)   07/01/21 72.2 kg (159 lb 3.2 oz)     GEN: well nourished, in no acute distress.  HEENT:  Pupils equal, round. Sclerae nonicteric.   NECK: Supple, no masses appreciated.   C/V:  Regular rate and rhythm  RESP: Respirations are unlabored. Clear to auscultation bilaterally without wheezing, rales, or rhonchi.  GI: Abdomen soft, nontender.  EXTREM: No LE edema.  NEURO: Alert and oriented, cooperative.  SKIN: Warm and dry.        Data: "     LIPID RESULTS:  Lab Results   Component Value Date    CHOL 134 06/14/2021    HDL 53 06/14/2021    LDL 67 06/14/2021    TRIG 71 06/14/2021     LIVER ENZYME RESULTS:  Lab Results   Component Value Date    AST 27 04/04/2019    ALT 22 06/14/2021     CBC RESULTS:  Lab Results   Component Value Date    WBC 5.3 06/14/2021    RBC 4.33 06/14/2021    HGB 12.8 06/14/2021    HCT 40.1 06/14/2021    MCV 93 06/14/2021    MCH 29.6 06/14/2021    MCHC 31.9 06/14/2021    RDW 13.5 06/14/2021     06/14/2021     BMP RESULTS:  Lab Results   Component Value Date     12/03/2021     06/23/2021    POTASSIUM 4.0 12/03/2021    POTASSIUM 4.5 06/23/2021    CHLORIDE 103 12/03/2021    CHLORIDE 104 06/23/2021    CO2 30 12/03/2021    CO2 31 06/23/2021    ANIONGAP 7 12/03/2021    ANIONGAP 3 06/23/2021    GLC 94 12/03/2021    GLC 86 06/23/2021    BUN 29 12/03/2021    BUN 30 06/23/2021    CR 1.19 (H) 12/03/2021    CR 1.29 (H) 06/23/2021    GFRESTIMATED 44 (L) 12/03/2021    GFRESTIMATED 39 (L) 06/23/2021    GFRESTBLACK 46 (L) 06/23/2021    BESS 8.8 12/03/2021    BESS 9.2 06/23/2021      A1C RESULTS:  Lab Results   Component Value Date    A1C 5.6 01/30/2019    A1C 5.3 01/04/2019     INR RESULTS:  Lab Results   Component Value Date    INR 3.3 (H) 12/02/2021    INR 2.4 (H) 11/04/2021    INR 2.30 (H) 07/09/2021    INR 2.40 (H) 06/10/2021            Medications     Current Outpatient Medications   Medication Sig Dispense Refill     Acetaminophen 325 MG CAPS Take 325-650 mg by mouth every 4 hours as needed       aspirin (ASA) 81 MG EC tablet Take 81 mg by mouth every 24 hours       cholecalciferol (VITAMIN D-1000 MAX ST) 1000 units TABS Take 1,000 Units by mouth daily        cyanocobalamin (VITAMIN B-12) 100 MCG tablet Take 1,000 mcg by mouth daily        escitalopram (LEXAPRO) 10 MG tablet Take 1 tablet (10 mg) by mouth daily 90 tablet 3     metoprolol tartrate (LOPRESSOR) 25 MG tablet Take 1 tablet (25 mg) by mouth 2 times daily 180  tablet 3     mirtazapine (REMERON) 15 MG tablet Take 1 tablet (15 mg) by mouth At Bedtime 90 tablet 3     torsemide (DEMADEX) 10 MG tablet Take 3 tablets (30 mg) by mouth daily 180 tablet 3     warfarin ANTICOAGULANT (COUMADIN) 2 MG tablet Take 2.5 tablets (5 mg) on Mondays and Fridays and take 2 tablets (4 mg) all other days or as directed by the INR clinic 200 tablet 0          Past Medical History     Past Medical History:   Diagnosis Date     Benign essential hypertension      Chronic atrial fibrillation (H)     s/p AV node ablation January, 2019     Chronic diastolic heart failure (H)      Chronic kidney disease, stage III (moderate) (H)      Past Surgical History:   Procedure Laterality Date     ANGIOGRAM Right 01/04/2019    Procedure: DIAGONSTIC ANGIOGRAM OF SMA;  Surgeon: Rigo Jennings MD;  Location:  OR     BIOPSY BREAST       BYPASS GRAFT ARTERY CORONARY, REPAIR VALVE AORTIC, COMBINED N/A 01/04/2019    Procedure: AORTIC DISSECTION REPAIR WITH GRAFT- HEMASHIELD PLATINUM WOVEN DOUBLE VELOR 4 SIDE ARM VASCULAR GRAFT, D:35 X 12 X 10 X 10MM/ L:50CM;  Surgeon: Jose Winslow MD;  Location:  OR     EP ABLATION AV NODE N/A 01/11/2019    Procedure: EP Ablation AV Node;  Surgeon: Tsering Davey MD;  Location:  HEART CARDIAC CATH LAB     EP PACEMAKER Left 01/11/2019    Procedure: EP Pacemaker;  Surgeon: Tsering Davey MD;  Location:  HEART CARDIAC CATH LAB     THYROID SURGERY      benign mass removed     TRACHEOSTOMY N/A 01/25/2019    Procedure: TRACHEOSTOMY  (DR MCCLELLAND);  Surgeon: Bryson Mcclelland MD;  Location:  OR     TUBAL LIGATION       Family History   Problem Relation Age of Onset     Diabetes No family hx of      Myocardial Infarction No family hx of      Cerebrovascular Disease No family hx of      Coronary Artery Disease Early Onset No family hx of      Breast Cancer No family hx of      Colon Cancer No family hx of      Ovarian Cancer No family hx of      Ataxia Mother       Heart Disease Father             Allergies   Amoxicillin and Ciprofloxacin    40 minutes spent on the date of the encounter doing chart review, history and exam, documentation and further activities as noted above      TAMRA Ryan CNP  [facility] HEART CARE  Pager: [phone]

## 2021-12-06 NOTE — PATIENT INSTRUCTIONS
1. No medication changes  2. Continue taking your water pill as you have been doing 3 pills some days and 2 pill other days.   3. Continue to monitor weight  4. Follow up with Dr. Kwok in 4 months with labs prior  5. Please call with any questions/concerns [phone]

## 2021-12-16 ENCOUNTER — ANTICOAGULATION THERAPY VISIT (OUTPATIENT)
Dept: ANTICOAGULATION | Facility: CLINIC | Age: 81
End: 2021-12-16

## 2021-12-16 ENCOUNTER — LAB (OUTPATIENT)
Dept: LAB | Facility: CLINIC | Age: 81
End: 2021-12-16
Payer: COMMERCIAL

## 2021-12-16 DIAGNOSIS — Z79.01 CURRENT USE OF LONG TERM ANTICOAGULATION: ICD-10-CM

## 2021-12-16 DIAGNOSIS — I48.20 CHRONIC ATRIAL FIBRILLATION (H): ICD-10-CM

## 2021-12-16 DIAGNOSIS — I48.20 CHRONIC ATRIAL FIBRILLATION (H): Primary | ICD-10-CM

## 2021-12-16 LAB — INR BLD: 2.8 (ref 0.9–1.1)

## 2021-12-16 PROCEDURE — 85610 PROTHROMBIN TIME: CPT

## 2021-12-16 PROCEDURE — 36416 COLLJ CAPILLARY BLOOD SPEC: CPT

## 2021-12-16 PROCEDURE — 99207 PR NO CHARGE NURSE ONLY: CPT

## 2021-12-16 NOTE — PROGRESS NOTES
ANTICOAGULATION FOLLOW-UP CLINIC VISIT    Patient Name:  Priya Funez  Date:  2021  Contact Type:  Telephone    SUBJECTIVE:         OBJECTIVE    Recent labs: (last 7 days)     21  1427   INR 2.8*       ASSESSMENT / PLAN  INR assessment THER    Recheck INR In: 3 WEEKS    INR Location Clinic      Anticoagulation Summary  As of 2021    INR goal:  2.0-3.0   TTR:  71.2 % (11.5 mo)   INR used for dosin.8 (2021)   Warfarin maintenance plan:  4 mg (2 mg x 2) every day   Full warfarin instructions:  4 mg every day   Weekly warfarin total:  28 mg   Plan last modified:  Christine Adames, RN (2021)   Next INR check:  2022   Priority:  Maintenance   Target end date:  2021    Indications    Chronic atrial fibrillation (H) [I48.20]  Current use of long term anticoagulation [Z79.01]             Anticoagulation Episode Summary     INR check location:      Preferred lab:      Send INR reminders to:  [facility]    Comments:        Anticoagulation Care Providers     Provider Role Specialty Phone number    Sharmaine Burks MD Referring Internal Medicine [phone]            See the Encounter Report to view Anticoagulation Flowsheet and Dosing Calendar (Go to Encounters tab in chart review, and find the Anticoagulation Therapy Visit)        Christine Adames, KIKE          ANTICOAGULATION MANAGEMENT     Priya Funez 81 year old female is on warfarin with therapeutic INR result. (Goal INR 2.0-3.0)    Recent labs: (last 7 days)     21  1427   INR 2.8*       ASSESSMENT     Source(s): Chart Review  Talked with Priya    Warfarin doses taken: Took warfarin 4 mg only since last INR    Diet: Increased greens/vitamin K in diet; ongoing change    New illness, injury, or hospitalization: No    Medication/supplement changes: None noted    Signs or symptoms of bleeding or clotting: No    Previous INR: Supratherapeutic    Additional findings: None     PLAN      Recommended plan for no diet, medication or health factor changes affecting INR     Dosing Instructions:  Decrease your warfarin dose (6.7% change) with next INR in 3 weeks       Summary  As of 12/16/2021    Full warfarin instructions:  4 mg every day   Next INR check:  1/6/2022             Telephone call with Priya who verbalizes understanding and agrees to plan    Lab visit scheduled    Education provided: Please call back if any changes to your diet, medications or how you've been taking warfarin    Plan made per [facility] anticoagulation protocol    Christine Adames RN  Anticoagulation Clinic  12/16/2021    _______________________________________________________________________     Anticoagulation Episode Summary     Current INR goal:  2.0-3.0   TTR:  71.2 % (11.5 mo)   Target end date:  12/17/2021   Send INR reminders to:  [facility]    Indications    Chronic atrial fibrillation (H) [I48.20]  Current use of long term anticoagulation [Z79.01]           Comments:           Anticoagulation Care Providers     Provider Role Specialty Phone number    Sharmaine Burks MD Referring Internal Medicine [phone]

## 2021-12-17 ENCOUNTER — ANCILLARY PROCEDURE (OUTPATIENT)
Dept: CARDIOLOGY | Facility: CLINIC | Age: 81
End: 2021-12-17
Attending: INTERNAL MEDICINE
Payer: COMMERCIAL

## 2021-12-17 DIAGNOSIS — Z95.0 CARDIAC PACEMAKER IN SITU: ICD-10-CM

## 2021-12-17 DIAGNOSIS — Z98.890 HX OF ATRIOVENTRICULAR NODE ABLATION: ICD-10-CM

## 2021-12-17 DIAGNOSIS — I44.2 COMPLETE HEART BLOCK (H): ICD-10-CM

## 2021-12-17 PROCEDURE — 93294 REM INTERROG EVL PM/LDLS PM: CPT | Performed by: INTERNAL MEDICINE

## 2021-12-17 PROCEDURE — 93296 REM INTERROG EVL PM/IDS: CPT | Performed by: INTERNAL MEDICINE

## 2021-12-28 LAB
MDC_IDC_LEAD_IMPLANT_DT: NORMAL
MDC_IDC_LEAD_LOCATION: NORMAL
MDC_IDC_LEAD_LOCATION_DETAIL_1: NORMAL
MDC_IDC_LEAD_MFG: NORMAL
MDC_IDC_LEAD_MODEL: NORMAL
MDC_IDC_LEAD_POLARITY_TYPE: NORMAL
MDC_IDC_LEAD_SERIAL: NORMAL
MDC_IDC_MSMT_BATTERY_DTM: NORMAL
MDC_IDC_MSMT_BATTERY_REMAINING_LONGEVITY: 125 MO
MDC_IDC_MSMT_BATTERY_RRT_TRIGGER: 2.62
MDC_IDC_MSMT_BATTERY_STATUS: NORMAL
MDC_IDC_MSMT_BATTERY_VOLTAGE: 2.95 V
MDC_IDC_MSMT_LEADCHNL_RV_IMPEDANCE_VALUE: 399 OHM
MDC_IDC_MSMT_LEADCHNL_RV_IMPEDANCE_VALUE: 437 OHM
MDC_IDC_MSMT_LEADCHNL_RV_PACING_THRESHOLD_AMPLITUDE: 0.5 V
MDC_IDC_MSMT_LEADCHNL_RV_PACING_THRESHOLD_PULSEWIDTH: 0.4 MS
MDC_IDC_MSMT_LEADCHNL_RV_SENSING_INTR_AMPL: 3.12 MV
MDC_IDC_MSMT_LEADCHNL_RV_SENSING_INTR_AMPL: 3.12 MV
MDC_IDC_PG_IMPLANT_DTM: NORMAL
MDC_IDC_PG_MFG: NORMAL
MDC_IDC_PG_MODEL: NORMAL
MDC_IDC_PG_SERIAL: NORMAL
MDC_IDC_PG_TYPE: NORMAL
MDC_IDC_SESS_CLINIC_NAME: NORMAL
MDC_IDC_SESS_DTM: NORMAL
MDC_IDC_SESS_TYPE: NORMAL
MDC_IDC_SET_BRADY_HYSTRATE: NORMAL
MDC_IDC_SET_BRADY_LOWRATE: 60 {BEATS}/MIN
MDC_IDC_SET_BRADY_MAX_SENSOR_RATE: 120 {BEATS}/MIN
MDC_IDC_SET_BRADY_MODE: NORMAL
MDC_IDC_SET_LEADCHNL_RV_PACING_AMPLITUDE: 2 V
MDC_IDC_SET_LEADCHNL_RV_PACING_ANODE_ELECTRODE_1: NORMAL
MDC_IDC_SET_LEADCHNL_RV_PACING_ANODE_LOCATION_1: NORMAL
MDC_IDC_SET_LEADCHNL_RV_PACING_CAPTURE_MODE: NORMAL
MDC_IDC_SET_LEADCHNL_RV_PACING_CATHODE_ELECTRODE_1: NORMAL
MDC_IDC_SET_LEADCHNL_RV_PACING_CATHODE_LOCATION_1: NORMAL
MDC_IDC_SET_LEADCHNL_RV_PACING_POLARITY: NORMAL
MDC_IDC_SET_LEADCHNL_RV_PACING_PULSEWIDTH: 0.4 MS
MDC_IDC_SET_LEADCHNL_RV_SENSING_ANODE_ELECTRODE_1: NORMAL
MDC_IDC_SET_LEADCHNL_RV_SENSING_ANODE_LOCATION_1: NORMAL
MDC_IDC_SET_LEADCHNL_RV_SENSING_CATHODE_ELECTRODE_1: NORMAL
MDC_IDC_SET_LEADCHNL_RV_SENSING_CATHODE_LOCATION_1: NORMAL
MDC_IDC_SET_LEADCHNL_RV_SENSING_POLARITY: NORMAL
MDC_IDC_SET_LEADCHNL_RV_SENSING_SENSITIVITY: 0.9 MV
MDC_IDC_SET_ZONE_DETECTION_INTERVAL: 360 MS
MDC_IDC_SET_ZONE_TYPE: NORMAL
MDC_IDC_STAT_BRADY_DTM_END: NORMAL
MDC_IDC_STAT_BRADY_DTM_START: NORMAL
MDC_IDC_STAT_BRADY_RV_PERCENT_PACED: 99.99 %
MDC_IDC_STAT_EPISODE_RECENT_COUNT: 0
MDC_IDC_STAT_EPISODE_RECENT_COUNT_DTM_END: NORMAL
MDC_IDC_STAT_EPISODE_RECENT_COUNT_DTM_START: NORMAL
MDC_IDC_STAT_EPISODE_TOTAL_COUNT: 0
MDC_IDC_STAT_EPISODE_TOTAL_COUNT_DTM_END: NORMAL
MDC_IDC_STAT_EPISODE_TOTAL_COUNT_DTM_START: NORMAL
MDC_IDC_STAT_EPISODE_TYPE: NORMAL

## 2022-01-06 ENCOUNTER — LAB (OUTPATIENT)
Dept: LAB | Facility: CLINIC | Age: 82
End: 2022-01-06
Payer: COMMERCIAL

## 2022-01-06 ENCOUNTER — ANTICOAGULATION THERAPY VISIT (OUTPATIENT)
Dept: ANTICOAGULATION | Facility: CLINIC | Age: 82
End: 2022-01-06

## 2022-01-06 DIAGNOSIS — I48.20 CHRONIC ATRIAL FIBRILLATION (H): Primary | ICD-10-CM

## 2022-01-06 DIAGNOSIS — I48.20 CHRONIC ATRIAL FIBRILLATION (H): ICD-10-CM

## 2022-01-06 DIAGNOSIS — Z79.01 CURRENT USE OF LONG TERM ANTICOAGULATION: ICD-10-CM

## 2022-01-06 LAB — INR BLD: 2.7 (ref 0.9–1.1)

## 2022-01-06 PROCEDURE — 85610 PROTHROMBIN TIME: CPT

## 2022-01-06 PROCEDURE — 99207 PR NO CHARGE NURSE ONLY: CPT

## 2022-01-06 PROCEDURE — 36416 COLLJ CAPILLARY BLOOD SPEC: CPT

## 2022-01-06 NOTE — PROGRESS NOTES
ANTICOAGULATION MANAGEMENT     Priya Funez 81 year old female is on warfarin with therapeutic INR result. (Goal INR 2.0-3.0)    Recent labs: (last 7 days)     01/06/22  1454   INR 2.7*       ASSESSMENT     Source(s): Chart Review and Patient/Caregiver Call       Warfarin doses taken: Warfarin taken as instructed    Diet: No new diet changes identified    New illness, injury, or hospitalization: No    Medication/supplement changes: None noted    Signs or symptoms of bleeding or clotting: No    Previous INR: Therapeutic last visit; previously outside of goal range    Additional findings: None     PLAN     Recommended plan for no diet, medication or health factor changes affecting INR     Dosing Instructions: Continue your current warfarin dose with next INR in 4 weeks       Summary  As of 1/6/2022    Full warfarin instructions:  4 mg every day   Next INR check:  2/3/2022             Telephone call with Priya who verbalizes understanding and agrees to plan    Lab visit scheduled    Education provided: Please call back if any changes to your diet, medications or how you've been taking warfarin    Plan made per [facility] anticoagulation protocol    Christine Adames RN  Anticoagulation Clinic  1/6/2022    _______________________________________________________________________     Anticoagulation Episode Summary     Current INR goal:  2.0-3.0   TTR:  72.9 % (1 y)   Target end date:  12/17/2021   Send INR reminders to:  [facility]    Indications    Chronic atrial fibrillation (H) [I48.20]  Current use of long term anticoagulation [Z79.01]           Comments:           Anticoagulation Care Providers     Provider Role Specialty Phone number    Sharmaine Burks MD Referring Internal Medicine [phone]

## 2022-01-10 DIAGNOSIS — I48.20 CHRONIC ATRIAL FIBRILLATION (H): ICD-10-CM

## 2022-01-11 RX ORDER — WARFARIN SODIUM 2 MG/1
TABLET ORAL
Qty: 200 TABLET | Refills: 0 | Status: SHIPPED | OUTPATIENT
Start: 2022-01-11 | End: 2022-04-21

## 2022-02-04 ENCOUNTER — ANTICOAGULATION THERAPY VISIT (OUTPATIENT)
Dept: ANTICOAGULATION | Facility: CLINIC | Age: 82
End: 2022-02-04

## 2022-02-04 ENCOUNTER — LAB (OUTPATIENT)
Dept: LAB | Facility: CLINIC | Age: 82
End: 2022-02-04
Payer: COMMERCIAL

## 2022-02-04 DIAGNOSIS — I48.20 CHRONIC ATRIAL FIBRILLATION (H): Primary | ICD-10-CM

## 2022-02-04 DIAGNOSIS — Z79.01 CURRENT USE OF LONG TERM ANTICOAGULATION: ICD-10-CM

## 2022-02-04 DIAGNOSIS — I48.20 CHRONIC ATRIAL FIBRILLATION (H): ICD-10-CM

## 2022-02-04 LAB — INR BLD: 2.1 (ref 0.9–1.1)

## 2022-02-04 PROCEDURE — 85610 PROTHROMBIN TIME: CPT

## 2022-02-04 PROCEDURE — 36416 COLLJ CAPILLARY BLOOD SPEC: CPT

## 2022-02-04 NOTE — PROGRESS NOTES
ANTICOAGULATION MANAGEMENT     Priya DAMON Armin 81 year old female is on warfarin with therapeutic INR result. (Goal INR 2.0-3.0)    Recent labs: (last 7 days)     02/04/22  1700   INR 2.1*       ASSESSMENT     Source(s): Chart Review and Patient/Caregiver Call       Warfarin doses taken: Warfarin taken as instructed    Diet: Increased greens/vitamin K in diet; plans to resume previous intake    New illness, injury, or hospitalization: No    Medication/supplement changes: None noted    Signs or symptoms of bleeding or clotting: No    Previous INR: Therapeutic last 2 visits    Additional findings: None     PLAN     Recommended plan for no diet, medication or health factor changes affecting INR     Dosing Instructions: Continue your current warfarin dose with next INR in 4 weeks       Summary  As of 2/4/2022    Full warfarin instructions:  4 mg every day   Next INR check:  3/2/2022             Telephone call with Priya who verbalizes understanding and agrees to plan    Lab visit scheduled    Education provided: Please call back if any changes to your diet, medications or how you've been taking warfarin, Monitoring for bleeding signs and symptoms, Monitoring for clotting signs and symptoms and Importance of notifying clinic for changes in medications; a sooner lab recheck maybe needed.    Plan made per [facility] anticoagulation protocol    Lucretia Go, RN  Anticoagulation Clinic  2/4/2022    _______________________________________________________________________     Anticoagulation Episode Summary     Current INR goal:  2.0-3.0   TTR:  79.1 % (1 y)   Target end date:  12/17/2021   Send INR reminders to:  [facility]    Indications    Chronic atrial fibrillation (H) [I48.20]  Current use of long term anticoagulation [Z79.01]           Comments:           Anticoagulation Care Providers     Provider Role Specialty Phone number    Sharmaine Burks MD Referring Internal Medicine [phone]

## 2022-03-02 ENCOUNTER — LAB (OUTPATIENT)
Dept: LAB | Facility: CLINIC | Age: 82
End: 2022-03-02
Payer: COMMERCIAL

## 2022-03-02 ENCOUNTER — ANTICOAGULATION THERAPY VISIT (OUTPATIENT)
Dept: ANTICOAGULATION | Facility: CLINIC | Age: 82
End: 2022-03-02

## 2022-03-02 DIAGNOSIS — N18.30 CHRONIC KIDNEY DISEASE, STAGE III (MODERATE) (H): Primary | ICD-10-CM

## 2022-03-02 DIAGNOSIS — I48.20 CHRONIC ATRIAL FIBRILLATION (H): Primary | ICD-10-CM

## 2022-03-02 DIAGNOSIS — Z79.01 CURRENT USE OF LONG TERM ANTICOAGULATION: ICD-10-CM

## 2022-03-02 DIAGNOSIS — I48.20 CHRONIC ATRIAL FIBRILLATION (H): ICD-10-CM

## 2022-03-02 LAB — INR BLD: 2.7 (ref 0.9–1.1)

## 2022-03-02 PROCEDURE — 36416 COLLJ CAPILLARY BLOOD SPEC: CPT

## 2022-03-02 PROCEDURE — 85610 PROTHROMBIN TIME: CPT

## 2022-03-02 NOTE — PROGRESS NOTES
ANTICOAGULATION MANAGEMENT     Priya MARISOL Funez 81 year old female is on warfarin with therapeutic INR result. (Goal INR 2.0-3.0)    Recent labs: (last 7 days)     03/02/22  1558   INR 2.7*       ASSESSMENT       Source(s): Chart Review and Patient/Caregiver Call       Warfarin doses taken: Warfarin taken as instructed    Diet: No new diet changes identified    New illness, injury, or hospitalization: No    Medication/supplement changes: None noted    Signs or symptoms of bleeding or clotting: No    Previous INR: Therapeutic last 2(+) visits    Additional findings: None       PLAN     Recommended plan for no diet, medication or health factor changes affecting INR     Dosing Instructions: Continue your current warfarin dose with next INR in 5 weeks       Summary  As of 3/2/2022    Full warfarin instructions:  4 mg every day   Next INR check:  4/6/2022             Telephone call with Priya who verbalizes understanding and agrees to plan    Lab visit scheduled    Education provided: Please call back if any changes to your diet, medications or how you've been taking warfarin    Plan made per [facility] anticoagulation protocol    Marcelino Palomares RN  Anticoagulation Clinic  3/2/2022    _______________________________________________________________________     Anticoagulation Episode Summary     Current INR goal:  2.0-3.0   TTR:  79.1 % (1 y)   Target end date:  Indefinite   Send INR reminders to:  [facility]    Indications    Chronic atrial fibrillation (H) [I48.20]  Current use of long term anticoagulation [Z79.01]           Comments:           Anticoagulation Care Providers     Provider Role Specialty Phone number    Sharmaine Burks MD Referring Internal Medicine [phone]

## 2022-03-10 ENCOUNTER — NURSE TRIAGE (OUTPATIENT)
Dept: INTERNAL MEDICINE | Facility: CLINIC | Age: 82
End: 2022-03-10
Payer: COMMERCIAL

## 2022-03-10 NOTE — TELEPHONE ENCOUNTER
Reason for call:  Patient reporting a symptom    Symptom or request: PT called and developed a black eye on 3/9- no injury.  PT states bruises easily.      Duration (how long have symptoms been present): Since 3-9    Have you been treated for this before? No    Additional comments: PT questions if should be concerned. Attempted to reach a nurse but PT didn't want to wait on hold.    Phone Number patient can be reached at:  Home number on file [phone] (home)    Best Time:  Will be around until 11:15am then after 3pm on 3/10    Can we leave a detailed message on this number:  YES    Call taken on 3/10/2022 at 9:58 AM by Taylor Anguiano

## 2022-03-10 NOTE — TELEPHONE ENCOUNTER
Patient has a bruise on the skin between her eye and nose.  She does not remember injuring it.  She states she does bruise VERY easily and has no other symptoms.  She will continue to watch for any symptoms of pain, swelling, increasing bruising, headache, eye symptoms, confusion, dizziness.  She will go to the ED with any worsening symptoms, and call back with additional questions.    Reason for Disposition    Taking Coumadin (warfarin) or other strong blood thinner, or known bleeding disorder (e.g., thrombocytopenia)    Face swelling, bruise or pain    Additional Information    Negative: Shock suspected (e.g., cold/pale/clammy skin, too weak to stand, low BP, rapid pulse)    Negative: Sounds like a life-threatening emergency to the triager    Bruise(s) of face or jaw    Negative: [1] Major bleeding (e.g., actively dripping or spurting) AND [2] can't be stopped    Negative: Bullet wound, knife wound, or other penetrating object    Negative: Difficulty breathing or choking    Negative: Knocked out (unconscious) > 1 minute    Negative: Difficult to awaken or acting confused (e.g., disoriented, slurred speech)    Negative: Severe neck pain    Negative: Sounds like a life-threatening emergency to the triager    Negative: [1] Injuries at more than 1 site AND [2] unsure which guideline to use    Negative: Injury mainly to forehead or head    Negative: Injury mainly to eye or orbit    Negative: Injury mainly to the ear    Negative: Injury mainly to the mouth    Negative: Injury mainly to the nose    Negative: Injury mainly to the teeth    Negative: Wound looks infected    Negative: Looks like a broken bone (e.g., cheekbone is flat on one side)    Negative: Can't fully open or close the mouth    Negative: Crooked face or smile    Negative: [1] Bleeding AND [2] won't stop after 10 minutes of direct pressure (using correct technique)    Negative: Skin is split open or gaping (or length > 1/4 inch or 6 mm)    Negative: Neck  pain    Negative: Injury caused by high speed (e.g., MVA), great height (e.g., fall > 10 feet or 3 meters), or severe blow from hard object (e.g., golf club or baseball bat)    Negative: Sounds like a serious injury to the triager    Negative: [1] Knocked out (unconscious) < 1 minute AND [2] now fine    Negative: Closing the mouth and biting down does not feel normal    Negative: Double vision or unable to look upward    Negative: Numbness of the face (i.e., claims loss of sensation in part of face)    Negative: [1] SEVERE pain AND [2] not improved 2 hours after pain medicine/ice packs    Negative: Suspicious history for the injury    Negative: Patient is confused or is an unreliable provider of information (e.g., dementia, severe intellectual disability, alcohol intoxication)    Negative: [1] Last tetanus shot > 5 years ago AND [2] DIRTY cut or scrape    Negative: [1] Last tetanus shot > 10 years ago AND [2] CLEAN cut or scrape (e.g., object AND skin were clean)    Negative: [1] After 72 hours AND [2] face pain not improving    Negative: [1] Face pain or swelling AND [2] present > 7 days    Negative: Large swelling or bruise > 2 inches (5 cm)    Negative: [1] No prior tetanus shots (or is not fully vaccinated) AND [2] any wound (e.g., cut, scrape)    Negative: [1] HIV positive or severe immunodeficiency (severely weak immune system) AND [2] DIRTY cut or scrape    Protocols used: FACE INJURY-A-AH, BRUISES-A-AH

## 2022-03-11 ENCOUNTER — TELEPHONE (OUTPATIENT)
Dept: INTERNAL MEDICINE | Facility: CLINIC | Age: 82
End: 2022-03-11

## 2022-03-11 ENCOUNTER — HOSPITAL ENCOUNTER (EMERGENCY)
Facility: CLINIC | Age: 82
Discharge: HOME | End: 2022-03-11
Attending: EMERGENCY MEDICINE | Admitting: EMERGENCY MEDICINE
Payer: COMMERCIAL

## 2022-03-11 VITALS
HEART RATE: 63 BPM | OXYGEN SATURATION: 98 % | RESPIRATION RATE: 20 BRPM | SYSTOLIC BLOOD PRESSURE: 137 MMHG | WEIGHT: 158 LBS | TEMPERATURE: 98.1 F | BODY MASS INDEX: 27.55 KG/M2 | DIASTOLIC BLOOD PRESSURE: 72 MMHG

## 2022-03-11 DIAGNOSIS — Z79.01 LONG TERM CURRENT USE OF ANTICOAGULANT THERAPY: ICD-10-CM

## 2022-03-11 DIAGNOSIS — R04.0 EPISTAXIS: ICD-10-CM

## 2022-03-11 LAB
HGB BLD-MCNC: 12.6 G/DL (ref 11.7–15.7)
INR PPP: 2.43 (ref 0.85–1.15)

## 2022-03-11 PROCEDURE — 99283 EMERGENCY DEPT VISIT LOW MDM: CPT | Mod: 25

## 2022-03-11 PROCEDURE — 85610 PROTHROMBIN TIME: CPT | Performed by: EMERGENCY MEDICINE

## 2022-03-11 PROCEDURE — 36415 COLL VENOUS BLD VENIPUNCTURE: CPT | Performed by: EMERGENCY MEDICINE

## 2022-03-11 PROCEDURE — 30901 CONTROL OF NOSEBLEED: CPT

## 2022-03-11 PROCEDURE — 85018 HEMOGLOBIN: CPT | Performed by: EMERGENCY MEDICINE

## 2022-03-11 ASSESSMENT — ENCOUNTER SYMPTOMS
RHINORRHEA: 1
COLOR CHANGE: 1

## 2022-03-11 NOTE — ED TRIAGE NOTES
Patient presents with complaints of a nose bleed that started about a half an hour prior to arrival. Patient is on coumadin, last INR was 2.7.

## 2022-03-11 NOTE — ED PROVIDER NOTES
History   Chief Complaint:  Epistaxis     HPI   Priya Funez is a 81 year old female with history of chronic atrial fibrillation on Coumadin who presents with 30-45 minutes of epistaxis. States her nose started bleeding spontaneously and she initially thought it was just runny. No recent picking at nose or blowing hard. States she likely swallowed a little bit of blood. Notes she has a chronically runny nose, but hasn't recently been congested. The patient also has a bruise around her left eye. States she woke up 2 days ago with a small bruise near her left eye. Thinks she might have been rubbing it. No other injury or illness reported.     Review of Systems   HENT: Positive for nosebleeds and rhinorrhea. Negative for congestion.    Skin: Positive for color change.   All other systems reviewed and are negative.    Allergies:  Amoxicillin  Ciprofloxacin    Medications:  Lexapro  Lopressor  Remeron  Demadex  Coumadin  Aspirin  Vitamin D  Vitamin B-12    Past Medical History:     Hypertension  Chronic atrial fibrillation  Chronic diastolic heart failure  CKD, stage 3  Current use of long term anticoagulation     Subglottic stenosis  IMAN  Anemia  Oropharyngeal dysphagia  Acute encephalopathy  Bilateral pleural effusion  Anasarca  Anxiety  Metabolic alkalosis  Pacemaker  Aneurysm    Past Surgical History:    Angiogram, right  Biopsy breast  Aortic dissection repair with graft   EP ablation AV node  EP pacemaker  Thyroid surgery  Tracheostomy  Tubal ligation     Family History:    Mother: ataxia, heart disease  Sister: ataxia  Son: diabetes  Father: heart disease     Social History:  The patient presents to the ED with her .   The patient has been  for 57 years and has 4 children and 8 grandchildren.     Physical Exam     Patient Vitals for the past 24 hrs:   BP Temp Temp src Pulse Resp SpO2 Weight   03/11/22 0635 137/72 98.1  F (36.7  C) Temporal 63 20 98 % 71.7 kg (158 lb)       Physical  Exam  Nursing note and vitals reviewed.  Constitutional:  Alert.  Appears comfortable.   HENT:    No blood in posterior pharynx.  She has a site of bleeding that is not actively bleeding on the right septum, it is more toward the back of the anterior septum.  It is on a mucosal ridge.  Eyes:    Conjunctivae are normal.  There is a little bruising over the medial left periorbital eye area.  She said this happened spontaneously.  Neurological:   Alert and appropriate. No focal weakness.  No facial droop.  Skin:    Skin is warm and dry. No rash noted. No diaphoresis.  Some bruising to the left medial periorbital aspect of the eye.  Psychiatric:   Behavior is normal. Judgment and thought content normal.     Emergency Department Course     Laboratory:  Labs Ordered and Resulted from Time of ED Arrival to Time of ED Departure   INR - Abnormal       Result Value    INR 2.43 (*)    HEMOGLOBIN - Normal    Hemoglobin 12.6          Procedures    Silver Nitrate Nasal Cautery     PROCEDURE NOTE: The patient's right nare was prepped with Afrin.  The location of the patient's bleeding in the right nare was identified and cauterized with silver nitrate.  The patient tolerated the procedure well and there were no complications.  He was observed in the emergency department following the procedure and had no recurrence of his bleeding.     Emergency Department Course:         Reviewed:  I reviewed nursing notes, vitals, past medical history and Care Everywhere    Assessments:  0643 I obtained history and examined the patient as noted above.   0720 I rechecked the patient and explained findings. Applied Afrin and re-clamped the patient's nose.   0749 I rechecked the patient. Performed the procedure as above.   0826 I rechecked the patient and explained discharge instructions.     Disposition:  The patient was discharged to home.     Impression & Plan     Medical Decision Making:  Patient comes in with right-sided epistaxis.  After the  clamp was placed she had no more bleeding the rest of the time in the emergency room.  Afrin was placed in the nose.  I then was able to successfully cauterize this area with silver nitrate.  She got up and ambulate had no further bleeding.  I feel she does not need packing at this point.  Her INR is therapeutic at 2.43.  Hemoglobin is 12.6.  I talked to her about being very gentle today and tomorrow and not straining or blowing her nose.  She can always return if she has recurrence and she cannot get it stopped at home.  I instructed her in how to put the clamp on properly.  She will follow up in the clinic this next week as needed.  She can return if she gets worse.  Patient is comfortable with this plan.    Humidifier, vaseline in your nares before bed starting in 2 nights.  Avoid straining, bending over, picking or blowing the nose.  You may use the nose clamp for up to 30 minutes to stop bleeding. Recheck in the clinic next week as needed or if you have further bleeding, return to the ER if it becomes heavy and you can't get it stopped.    Diagnosis:    ICD-10-CM    1. Epistaxis  R04.0    2. Long term current use of anticoagulant therapy  Z79.01        Discharge Medications:  New Prescriptions    No medications on file       Scribe Disclosure:  I, Soni Mejia, am serving as a scribe at 6:43 AM on 3/11/2022 to document services personally performed by Angélica Wilcox MD based on my observations and the provider's statements to me.      Angélica Wilcox MD  03/11/22 0886

## 2022-03-11 NOTE — TELEPHONE ENCOUNTER
ANTICOAGULATION  MANAGEMENT: Discharge Review    Priya Funez chart reviewed for anticoagulation continuity of care    Emergency room visit on 3/11/22 for Epistaxis - patient reported nosebleed lasting for 40 minutes/right nostril was cauterized in ER    Discharge disposition: Home    Results:    Recent labs: (last 7 days)     03/11/22  0657   INR 2.43*     Anticoagulation inpatient management:     not applicable     Anticoagulation discharge instructions:     Warfarin dosing: home regimen continued   Bridging: No   INR goal change: No      Medication changes affecting anticoagulation: No    Additional factors affecting anticoagulation: No    Plan     Recommend to check INR on 3/25/22 - assisted patient in scheduling f/u with PCP as recommended    Spoke with Priya    No adjustment to Anticoagulation Calendar was required    Lucretia Go RN

## 2022-03-11 NOTE — DISCHARGE INSTRUCTIONS
Humidifier, vaseline in your nares before bed starting in 2 nights.  Avoid straining, bending over, picking or blowing the nose.  You may use the nose clamp for up to 30 minutes to stop bleeding. Recheck in the clinic next week as needed or if you have further bleeding, return to the ER if it becomes heavy and you can't get it stopped.

## 2022-03-14 ENCOUNTER — NURSE TRIAGE (OUTPATIENT)
Dept: INTERNAL MEDICINE | Facility: CLINIC | Age: 82
End: 2022-03-14
Payer: COMMERCIAL

## 2022-03-14 NOTE — TELEPHONE ENCOUNTER
Pt called the clinic reporting blood drops from the nose on 3 different occasions following the 3/11/22 ER visit. Nosebleed does stop after 15 - 30 minutes of nose clamp given at the ER. Some blood tinged mucus in the throat. No clots not blood flow from nose. Pt has Afrin at home that she will use and follow up with PCP at future OV. Will go to ER if nosebleed does not stop.     Appointments in Next Year    Mar 16, 2022  1:30 PM  (Arrive by 1:15 PM)  ED/Hospital Follow Up with Sharmaine Burks MD  [facility] ([facility] ) [phone]       Reason for Disposition    Mild-moderate nosebleed and bleeding has stopped now    Additional Information    Negative: Bleeding present > 30 minutes and using correct method of direct pressure    Negative: Bleeding now and second call after being instructed in correct technique of direct pressure    Negative: Lightheadedness or dizziness    Negative: Pale skin (pallor) of new onset or worsening    Negative: Has nasal packing (inserted by health care provider to control bleeding) and now has new rash    Negative: Has nasal packing and now has bleeding around the packing (Exception: few drops or ooze)    Negative: Patient sounds very sick or weak to the triager    Negative: Nosebleed followed nose injury    Negative: Fainted (passed out), or too weak to stand following large blood loss    Negative: Sounds like a life-threatening emergency to the triager    Negative: Large amount of blood has been lost (e.g., one cup)    Negative: Bleeding recurs 3 or more times in 24 hours despite direct pressure    Negative: Taking Coumadin (warfarin) or other strong blood thinner, or known bleeding disorder (e.g., thrombocytopenia)    Negative: Has skin bruises or bleeding gums that are not caused by an injury    Negative: Has nasal packing and now has fever > 100.4 F (38.0 C)    Negative: Patient wants to be seen    Negative:  Has nasal packing (inserted by health care provider to control bleeding)    Negative: Hard-to-stop nosebleeds are a chronic symptom (recurrent or ongoing AND lasting > 4 weeks)    Negative: Easy bleeding present in other family members    Protocols used: NOSEBLEED-A-OH

## 2022-03-15 NOTE — TELEPHONE ENCOUNTER
Patient was erroneously scheduled in a same day slot for Wed 3/16/22. This was not approved.    Please reschedule patient to an appropriate visit slot.    If there are no slots available...    May use any virtual spot for an in-person visit.     Patient may also be seen at noon (arrival time 11:40am) Mondays, Wednesdays, Thursdays, and Fridays OR at 1:00pm (arrival time 12:40pm) on Thursdays (during the huddle).    Please do NOT schedule in a same day, next day, or hospital follow-up slot.

## 2022-03-15 NOTE — TELEPHONE ENCOUNTER
Attempted to reach patient again to assist in rescheduling tomorrow's appt - no answer. Writer left vm noting appt has been cancelled and encouraged callback to reschedule as offered below by Dr. Burks. Writer apologized for the inconvenience. Lucretia Go, AYSEN, RN

## 2022-03-15 NOTE — TELEPHONE ENCOUNTER
Apologize for scheduling error. Writer left  for patient requesting callback to r/s tomorrow's appt. Lucretia Go, AYSEN, RN

## 2022-03-17 ENCOUNTER — TELEPHONE (OUTPATIENT)
Dept: NURSING | Facility: CLINIC | Age: 82
End: 2022-03-17
Payer: COMMERCIAL

## 2022-03-17 ENCOUNTER — TELEPHONE (OUTPATIENT)
Dept: INTERNAL MEDICINE | Facility: CLINIC | Age: 82
End: 2022-03-17
Payer: COMMERCIAL

## 2022-03-17 NOTE — TELEPHONE ENCOUNTER
Reason for Call:  Same Day Appointment, Requested Provider:  Today/Tomorrow Per Nurse    PCP: Sharmaine Burks    Reason for visit: Bad bloody nose - follow up from ER visit    Duration of symptoms: 1-2 days    Have you been treated for this in the past? No    Additional comments: NA    Can we leave a detailed message on this number? YES    Phone number patient can be reached at: Home number on file [phone] (home)    Best Time: Anytime    Call taken on 3/17/2022 at 3:34 PM by Sharmila Garcia

## 2022-03-17 NOTE — TELEPHONE ENCOUNTER
Bloody nose  3/11/22  Went to ER.  Cauterized.    Instructed to see pcp in follow up this week.    Had an appointment but when patient called with question about the small amount of bleeding that had been going on since her ER visit, the person she spoke with cancelled her appointment.    Calling again today to ask about getting an appointment.    Sara DAVIS RN [facility]

## 2022-03-18 ENCOUNTER — ANCILLARY PROCEDURE (OUTPATIENT)
Dept: CARDIOLOGY | Facility: CLINIC | Age: 82
End: 2022-03-18
Attending: INTERNAL MEDICINE
Payer: COMMERCIAL

## 2022-03-18 DIAGNOSIS — Z95.0 CARDIAC PACEMAKER IN SITU: ICD-10-CM

## 2022-03-18 PROCEDURE — 93294 REM INTERROG EVL PM/LDLS PM: CPT | Performed by: INTERNAL MEDICINE

## 2022-03-18 PROCEDURE — 93296 REM INTERROG EVL PM/IDS: CPT | Performed by: INTERNAL MEDICINE

## 2022-03-25 ENCOUNTER — LAB (OUTPATIENT)
Dept: LAB | Facility: CLINIC | Age: 82
End: 2022-03-25
Payer: COMMERCIAL

## 2022-03-25 ENCOUNTER — ANTICOAGULATION THERAPY VISIT (OUTPATIENT)
Dept: ANTICOAGULATION | Facility: CLINIC | Age: 82
End: 2022-03-25

## 2022-03-25 DIAGNOSIS — I48.20 CHRONIC ATRIAL FIBRILLATION (H): Primary | ICD-10-CM

## 2022-03-25 DIAGNOSIS — Z79.01 CURRENT USE OF LONG TERM ANTICOAGULATION: ICD-10-CM

## 2022-03-25 DIAGNOSIS — I48.20 CHRONIC ATRIAL FIBRILLATION (H): ICD-10-CM

## 2022-03-25 LAB — INR BLD: 2.5 (ref 0.9–1.1)

## 2022-03-25 PROCEDURE — 85610 PROTHROMBIN TIME: CPT

## 2022-03-25 PROCEDURE — 36416 COLLJ CAPILLARY BLOOD SPEC: CPT

## 2022-03-25 NOTE — PROGRESS NOTES
ANTICOAGULATION MANAGEMENT     Priya Funez 81 year old female is on warfarin with therapeutic INR result. (Goal INR 2.0-3.0)    Recent labs: (last 7 days)     03/25/22  1400   INR 2.5*       ASSESSMENT       Source(s): Chart Review and Patient/Caregiver Call       Warfarin doses taken: Warfarin taken as instructed    Diet: No new diet changes identified    New illness, injury, or hospitalization: Yes:  On 3/11 was in ED for nose bleed.  They cauteized with silver nitrate    Medication/supplement changes: None noted    Signs or symptoms of bleeding or clotting: No  None since nose bleed    Previous INR: Therapeutic last 2(+) visits    Additional findings: None       PLAN     Recommended plan for no diet, medication or health factor changes affecting INR     Dosing Instructions: Continue your current warfarin dose with next INR in 4 weeks       Summary  As of 3/25/2022    Full warfarin instructions:  4 mg every day   Next INR check:  4/22/2022             Telephone call with Priya who verbalizes understanding and agrees to plan    Lab visit scheduled    Education provided: Please call back if any changes to your diet, medications or how you've been taking warfarin    Plan made per [facility] anticoagulation protocol    Christine Adames RN  Anticoagulation Clinic  3/25/2022    _______________________________________________________________________     Anticoagulation Episode Summary     Current INR goal:  2.0-3.0   TTR:  79.1 % (1 y)   Target end date:  Indefinite   Send INR reminders to:  [facility]    Indications    Chronic atrial fibrillation (H) [I48.20]  Current use of long term anticoagulation [Z79.01]           Comments:           Anticoagulation Care Providers     Provider Role Specialty Phone number    Sharmaine Burks MD Referring Internal Medicine [phone]

## 2022-03-31 LAB
MDC_IDC_LEAD_IMPLANT_DT: NORMAL
MDC_IDC_LEAD_LOCATION: NORMAL
MDC_IDC_LEAD_LOCATION_DETAIL_1: NORMAL
MDC_IDC_LEAD_MFG: NORMAL
MDC_IDC_LEAD_MODEL: NORMAL
MDC_IDC_LEAD_POLARITY_TYPE: NORMAL
MDC_IDC_LEAD_SERIAL: NORMAL
MDC_IDC_MSMT_BATTERY_DTM: NORMAL
MDC_IDC_MSMT_BATTERY_REMAINING_LONGEVITY: 122 MO
MDC_IDC_MSMT_BATTERY_RRT_TRIGGER: 2.62
MDC_IDC_MSMT_BATTERY_STATUS: NORMAL
MDC_IDC_MSMT_BATTERY_VOLTAGE: 2.95 V
MDC_IDC_MSMT_LEADCHNL_RV_IMPEDANCE_VALUE: 380 OHM
MDC_IDC_MSMT_LEADCHNL_RV_IMPEDANCE_VALUE: 437 OHM
MDC_IDC_MSMT_LEADCHNL_RV_PACING_THRESHOLD_AMPLITUDE: 0.5 V
MDC_IDC_MSMT_LEADCHNL_RV_PACING_THRESHOLD_PULSEWIDTH: 0.4 MS
MDC_IDC_MSMT_LEADCHNL_RV_SENSING_INTR_AMPL: 3.12 MV
MDC_IDC_MSMT_LEADCHNL_RV_SENSING_INTR_AMPL: 3.12 MV
MDC_IDC_PG_IMPLANT_DTM: NORMAL
MDC_IDC_PG_MFG: NORMAL
MDC_IDC_PG_MODEL: NORMAL
MDC_IDC_PG_SERIAL: NORMAL
MDC_IDC_PG_TYPE: NORMAL
MDC_IDC_SESS_CLINIC_NAME: NORMAL
MDC_IDC_SESS_DTM: NORMAL
MDC_IDC_SESS_TYPE: NORMAL
MDC_IDC_SET_BRADY_HYSTRATE: NORMAL
MDC_IDC_SET_BRADY_LOWRATE: 60 {BEATS}/MIN
MDC_IDC_SET_BRADY_MAX_SENSOR_RATE: 120 {BEATS}/MIN
MDC_IDC_SET_BRADY_MODE: NORMAL
MDC_IDC_SET_LEADCHNL_RV_PACING_AMPLITUDE: 2 V
MDC_IDC_SET_LEADCHNL_RV_PACING_ANODE_ELECTRODE_1: NORMAL
MDC_IDC_SET_LEADCHNL_RV_PACING_ANODE_LOCATION_1: NORMAL
MDC_IDC_SET_LEADCHNL_RV_PACING_CAPTURE_MODE: NORMAL
MDC_IDC_SET_LEADCHNL_RV_PACING_CATHODE_ELECTRODE_1: NORMAL
MDC_IDC_SET_LEADCHNL_RV_PACING_CATHODE_LOCATION_1: NORMAL
MDC_IDC_SET_LEADCHNL_RV_PACING_POLARITY: NORMAL
MDC_IDC_SET_LEADCHNL_RV_PACING_PULSEWIDTH: 0.4 MS
MDC_IDC_SET_LEADCHNL_RV_SENSING_ANODE_ELECTRODE_1: NORMAL
MDC_IDC_SET_LEADCHNL_RV_SENSING_ANODE_LOCATION_1: NORMAL
MDC_IDC_SET_LEADCHNL_RV_SENSING_CATHODE_ELECTRODE_1: NORMAL
MDC_IDC_SET_LEADCHNL_RV_SENSING_CATHODE_LOCATION_1: NORMAL
MDC_IDC_SET_LEADCHNL_RV_SENSING_POLARITY: NORMAL
MDC_IDC_SET_LEADCHNL_RV_SENSING_SENSITIVITY: 0.9 MV
MDC_IDC_SET_ZONE_DETECTION_INTERVAL: 360 MS
MDC_IDC_SET_ZONE_TYPE: NORMAL
MDC_IDC_STAT_BRADY_DTM_END: NORMAL
MDC_IDC_STAT_BRADY_DTM_START: NORMAL
MDC_IDC_STAT_BRADY_RV_PERCENT_PACED: 99.99 %
MDC_IDC_STAT_EPISODE_RECENT_COUNT: 0
MDC_IDC_STAT_EPISODE_RECENT_COUNT_DTM_END: NORMAL
MDC_IDC_STAT_EPISODE_RECENT_COUNT_DTM_START: NORMAL
MDC_IDC_STAT_EPISODE_TOTAL_COUNT: 0
MDC_IDC_STAT_EPISODE_TOTAL_COUNT_DTM_END: NORMAL
MDC_IDC_STAT_EPISODE_TOTAL_COUNT_DTM_START: NORMAL
MDC_IDC_STAT_EPISODE_TYPE: NORMAL

## 2022-04-04 ENCOUNTER — OFFICE VISIT (OUTPATIENT)
Dept: INTERNAL MEDICINE | Facility: CLINIC | Age: 82
End: 2022-04-04
Payer: COMMERCIAL

## 2022-04-04 ENCOUNTER — ANCILLARY PROCEDURE (OUTPATIENT)
Dept: GENERAL RADIOLOGY | Facility: CLINIC | Age: 82
End: 2022-04-04
Attending: INTERNAL MEDICINE
Payer: COMMERCIAL

## 2022-04-04 VITALS
DIASTOLIC BLOOD PRESSURE: 78 MMHG | BODY MASS INDEX: 27.49 KG/M2 | HEART RATE: 94 BPM | RESPIRATION RATE: 16 BRPM | SYSTOLIC BLOOD PRESSURE: 126 MMHG | TEMPERATURE: 97.3 F | OXYGEN SATURATION: 96 % | HEIGHT: 64 IN | WEIGHT: 161 LBS

## 2022-04-04 DIAGNOSIS — N18.31 STAGE 3A CHRONIC KIDNEY DISEASE (H): ICD-10-CM

## 2022-04-04 DIAGNOSIS — R05.9 COUGH: ICD-10-CM

## 2022-04-04 DIAGNOSIS — R06.00 PND (PAROXYSMAL NOCTURNAL DYSPNEA): ICD-10-CM

## 2022-04-04 DIAGNOSIS — R04.0 EPISTAXIS: Primary | ICD-10-CM

## 2022-04-04 DIAGNOSIS — R06.01 ORTHOPNEA: ICD-10-CM

## 2022-04-04 DIAGNOSIS — R06.02 SHORTNESS OF BREATH: ICD-10-CM

## 2022-04-04 DIAGNOSIS — I50.32 CHRONIC DIASTOLIC HEART FAILURE (H): ICD-10-CM

## 2022-04-04 DIAGNOSIS — I48.20 CHRONIC ATRIAL FIBRILLATION (H): ICD-10-CM

## 2022-04-04 PROCEDURE — 99214 OFFICE O/P EST MOD 30 MIN: CPT | Performed by: INTERNAL MEDICINE

## 2022-04-04 PROCEDURE — 71046 X-RAY EXAM CHEST 2 VIEWS: CPT | Performed by: RADIOLOGY

## 2022-04-04 RX ORDER — ALBUTEROL SULFATE 90 UG/1
2 AEROSOL, METERED RESPIRATORY (INHALATION) EVERY 6 HOURS PRN
Qty: 18 G | Refills: 0 | Status: SHIPPED | OUTPATIENT
Start: 2022-04-04

## 2022-04-04 NOTE — PATIENT INSTRUCTIONS
Chest xray today.    You will be contacted to schedule your echocardiogram.    TRIAL of albuterol inhaler - google how to use it and watch a video before using.     If no improvement of if needing frequently/regularly, please let me know.

## 2022-04-04 NOTE — PROGRESS NOTES
ASSESSMENT/PLAN                                                      (R04.0) Epistaxis  (primary encounter diagnosis)  Comment: resolved; no recent episodes.    (R06.02) Shortness of breath  (R06.01) Orthopnea  (R06.00) PND (paroxysmal nocturnal dyspnea)  (R05.9) Cough  (I50.32) Chronic diastolic heart failure (H)  Comment: differential includes acute on chronic diastolic heart failure and pulmonary pathology.  Plan: echocardiogram ordered - patient will be contacted to schedule; CXR today; TRIAL of albuterol infrequently as needed (patient cautioned against frequent/regular use); if symptoms worsen, change, or do not improve, patient to contact MD.      (I48.20) Chronic atrial fibrillation (H)  Comment: rate controlled on metoprolol; chronic AC on Coumadin.    (N18.31) Stage 3a chronic kidney disease (H)  Comment: known issue that I take into account for their medical decisions, no current exacerbations or new concerns.    Sharmaine Burks MD   [address] [address]  T: [phone], F: [phone]    SUBJECTIVE                                                      Priya Funez is a very pleasant 81 year old female who presents with ER follow-up:    Patient presented to the ER 3/11/2022 with ongoing epistaxis. On chronic AC (Coumadin) for chronic atrial fibrillation. Bleeding vessel cauterized with silver nitrate. Hemoglobin stable at 12.6. Patient had several episodes of recurrent epistaxis for several days after her ER visit, all which stopped after 15-30 minutes of nose clamping. No recurrent episodes since.    Unrelated to above, patient complains of shortness of breath. Always present. Worse with activity and lying down.  Sometimes wakes up gasping for breath.  No lower extremity edema or weight changes. Occasional productive cough.    OBJECTIVE                                                      /78 (BP Location: Left arm, Patient Position: Chair, Cuff  "Size: Adult Regular)   Pulse 94   Temp 97.3  F (36.3  C) (Temporal)   Resp 16   Ht 1.613 m (5' 3.5\")   Wt 73 kg (161 lb)   SpO2 96%   BMI 28.07 kg/m    Constitutional: well-appearing  Respiratory: normal respiratory effort; clear to auscultation bilaterally - no wheezing, rhonchi, crackles, or diminished breath  Cardiovascular: irregularly irregular; no edema    ---    (Note documentation was completed, in part, with [employer] voice-recognition software. Documentation was reviewed, but some grammatical, spelling, and word errors may remain.)    "

## 2022-04-06 ENCOUNTER — ANTICOAGULATION THERAPY VISIT (OUTPATIENT)
Dept: ANTICOAGULATION | Facility: CLINIC | Age: 82
End: 2022-04-06

## 2022-04-06 ENCOUNTER — LAB (OUTPATIENT)
Dept: LAB | Facility: CLINIC | Age: 82
End: 2022-04-06
Payer: COMMERCIAL

## 2022-04-06 DIAGNOSIS — Z79.01 CURRENT USE OF LONG TERM ANTICOAGULATION: ICD-10-CM

## 2022-04-06 DIAGNOSIS — I48.20 CHRONIC ATRIAL FIBRILLATION (H): Primary | ICD-10-CM

## 2022-04-06 DIAGNOSIS — I48.20 CHRONIC ATRIAL FIBRILLATION (H): ICD-10-CM

## 2022-04-06 LAB — INR BLD: 2.4 (ref 0.9–1.1)

## 2022-04-06 PROCEDURE — 36416 COLLJ CAPILLARY BLOOD SPEC: CPT

## 2022-04-06 PROCEDURE — 85610 PROTHROMBIN TIME: CPT

## 2022-04-06 NOTE — PROGRESS NOTES
ANTICOAGULATION MANAGEMENT     Priya MARISOL Funez 81 year old female is on warfarin with therapeutic INR result. (Goal INR 2.0-3.0)    Recent labs: (last 7 days)     04/06/22  1543   INR 2.4*       ASSESSMENT       Source(s): Chart Review and Patient/Caregiver Call       Warfarin doses taken: Warfarin taken as instructed    Diet: No new diet changes identified    New illness, injury, or hospitalization: No    Medication/supplement changes: None noted    Signs or symptoms of bleeding or clotting: No    Previous INR: Therapeutic last 2(+) visits    Additional findings: None       PLAN     Recommended plan for no diet, medication or health factor changes affecting INR     Dosing Instructions: continue your current warfarin dose with next INR in 4 weeks       Summary  As of 4/6/2022    Full warfarin instructions:  4 mg every day   Next INR check:  5/4/2022             Telephone call with Priya who verbalizes understanding and agrees to plan    Lab visit scheduled    Education provided: Please call back if any changes to your diet, medications or how you've been taking warfarin    Plan made per [facility] anticoagulation protocol    Marcelino Palomares RN  Anticoagulation Clinic  4/6/2022    _______________________________________________________________________     Anticoagulation Episode Summary     Current INR goal:  2.0-3.0   TTR:  79.1 % (1 y)   Target end date:  Indefinite   Send INR reminders to:  [facility]    Indications    Chronic atrial fibrillation (H) [I48.20]  Current use of long term anticoagulation [Z79.01]           Comments:           Anticoagulation Care Providers     Provider Role Specialty Phone number    Sharmaine Burks MD Referring Internal Medicine [phone]

## 2022-04-11 ENCOUNTER — LAB (OUTPATIENT)
Dept: LAB | Facility: CLINIC | Age: 82
End: 2022-04-11
Payer: COMMERCIAL

## 2022-04-11 ENCOUNTER — OFFICE VISIT (OUTPATIENT)
Dept: CARDIOLOGY | Facility: CLINIC | Age: 82
End: 2022-04-11
Attending: NURSE PRACTITIONER
Payer: COMMERCIAL

## 2022-04-11 VITALS
HEART RATE: 79 BPM | DIASTOLIC BLOOD PRESSURE: 84 MMHG | HEIGHT: 64 IN | SYSTOLIC BLOOD PRESSURE: 127 MMHG | WEIGHT: 161 LBS | BODY MASS INDEX: 27.49 KG/M2 | OXYGEN SATURATION: 98 %

## 2022-04-11 DIAGNOSIS — Z79.01 CURRENT USE OF LONG TERM ANTICOAGULATION: ICD-10-CM

## 2022-04-11 DIAGNOSIS — I50.32 CHRONIC DIASTOLIC HEART FAILURE (H): ICD-10-CM

## 2022-04-11 DIAGNOSIS — N18.31 STAGE 3A CHRONIC KIDNEY DISEASE (H): ICD-10-CM

## 2022-04-11 DIAGNOSIS — I50.32 CHRONIC DIASTOLIC CONGESTIVE HEART FAILURE (H): ICD-10-CM

## 2022-04-11 DIAGNOSIS — I48.20 CHRONIC ATRIAL FIBRILLATION (H): Primary | ICD-10-CM

## 2022-04-11 LAB
ANION GAP SERPL CALCULATED.3IONS-SCNC: 4 MMOL/L (ref 3–14)
BUN SERPL-MCNC: 24 MG/DL (ref 7–30)
CALCIUM SERPL-MCNC: 9.3 MG/DL (ref 8.5–10.1)
CHLORIDE BLD-SCNC: 102 MMOL/L (ref 94–109)
CO2 SERPL-SCNC: 33 MMOL/L (ref 20–32)
CREAT SERPL-MCNC: 1.18 MG/DL (ref 0.52–1.04)
GFR SERPL CREATININE-BSD FRML MDRD: 47 ML/MIN/1.73M2
GLUCOSE BLD-MCNC: 90 MG/DL (ref 70–99)
POTASSIUM BLD-SCNC: 3.5 MMOL/L (ref 3.4–5.3)
SODIUM SERPL-SCNC: 139 MMOL/L (ref 133–144)

## 2022-04-11 PROCEDURE — 80048 BASIC METABOLIC PNL TOTAL CA: CPT | Performed by: NURSE PRACTITIONER

## 2022-04-11 PROCEDURE — 99214 OFFICE O/P EST MOD 30 MIN: CPT | Performed by: INTERNAL MEDICINE

## 2022-04-11 PROCEDURE — 36415 COLL VENOUS BLD VENIPUNCTURE: CPT | Performed by: NURSE PRACTITIONER

## 2022-04-11 NOTE — PATIENT INSTRUCTIONS
Schedule echocardiogram.   Continue low Na diet.  Continue current diuretic regimen.  Continue daily weights.   Follow up with Xochitl Eden CNP in core clinic in about 2 months.

## 2022-04-11 NOTE — PROGRESS NOTES
[facility] Progress note    Assessment:      81-year-old woman with chronic diastolic heart failure and RV dysfunction with moderate to severe tricuspid regurgitation as well as CKD, hypertension and chronic A. fib status post AV node ablation and pacemaker placement here for routine follow-up and notes worsening exertional dyspnea.  Symptoms are clearly worse when she has extra salt load.      1.  Chronic diastolic heart failure/HFpEF/right heart failure :  LVEF 60-65%, LV normal size/function, RV mild to moderately dilated, RV systolic function mildly decreased.               - NYHA class III, stage C              - Fluid status : euvolemic              - Diuretic regimen : torsemide to 30mg 4-5 days per week and 20mg other days, based on symptoms per patient.               - Aldosterone antagonist : none, could consider in future.               - Blood pressure : controlled  2. Chronic atrial fibrillation s/p AV node ablation 1/2019 - stable, asymptomatic. Most recent device interrogation was stable in Sept 2021.               - Continue routine follow up with EP and device clinic. Last device check 3/18/22, normal device function              - [id number] score 3 (age, female, HF)              - Continue warfarin for thromboembolic prophylaxis  3. Emergent aortic dissection repair 1/4/19              - CT 6 months after surgery for surveillance completed 8/7/2019 which was stable, showing similar appearance as post repair.   4. Hypertension - controlled, continue current medications  5. Obesity - counseled patient on weight loss strategies.  6. Mild mitral regurgitation  7. Moderate to severe tricuspid regurgitation 6/14/21 echo  8.  CKD III, stable  9.  Epistaxis, was in ED in March. No recurrence. Discussed stopping asa. She is tolerating at this time but will stop if she has bleeding recurrence.     Plan:  1. Schedule echocardiogram to follow up valvular heart disease, EF aorta  2. Continue low Na  "diet.  3. Continue current diuretic regimen.  4. Continue daily weights.   5. Follow up with Xochitl Eden CNP in core clinic in about 2 months.     HPI:     Priya Funez is a very pleasant 81 year old female with a history of HFpEF, atrial fibrillation s/p AV leo ablation with PPM implantation 1/11/2019, emergent aortic dissection repair 1/4/19, hypertension and obesity.     She is here for follow up of HFpEF. She has noticed that she is very sodium-sensitive and when she has more sodium she will wake up the following day and feel more short of breath and cough up \"mucous\".  She takes 20mg torsemide most days.        She walks in [employer] almost every day. Her exercise tolerance is steady. She does not feel like she is making improvement, but she not getting weaker. She walks about 20-30 min/day.       CURRENT MEDICATIONS:  Current Outpatient Medications   Medication Sig Dispense Refill     Acetaminophen 325 MG CAPS Take 325-650 mg by mouth every 4 hours as needed       albuterol (PROAIR HFA/PROVENTIL HFA/VENTOLIN HFA) 108 (90 Base) MCG/ACT inhaler Inhale 2 puffs into the lungs every 6 hours as needed for shortness of breath / dyspnea or wheezing 18 g 0     aspirin (ASA) 81 MG EC tablet Take 81 mg by mouth every 24 hours       cholecalciferol 25 MCG (1000 UT) TABS Take 1,000 Units by mouth daily        cyanocobalamin (VITAMIN B-12) 100 MCG tablet Take 1,000 mcg by mouth daily        escitalopram (LEXAPRO) 10 MG tablet Take 1 tablet (10 mg) by mouth daily 90 tablet 3     metoprolol tartrate (LOPRESSOR) 25 MG tablet Take 1 tablet (25 mg) by mouth 2 times daily 180 tablet 3     mirtazapine (REMERON) 15 MG tablet Take 1 tablet (15 mg) by mouth At Bedtime 90 tablet 3     torsemide (DEMADEX) 10 MG tablet Take 3 tablets (30 mg) by mouth daily 180 tablet 3     warfarin ANTICOAGULANT (COUMADIN) 2 MG tablet Take 2.5 tablets (5 mg) on Mondays and Fridays and take 2 tablets (4 mg) all other days or as directed by the " INR clinic 200 tablet 0       ALLERGIES     Allergies   Allergen Reactions     Amoxicillin Swelling     Face and body     Ciprofloxacin Hives       PAST MEDICAL, SURGICAL, FAMILY, SOCIAL HISTORY:  History was reviewed and updated as needed, see medical record.  Never-smoker  Her mom  at age 95 while Priya Funez was recovering from her dissection    REVIEW OF SYSTEMS:  Skin:  Negative     Eyes:  Positive for glasses  ENT:  Negative    Respiratory:  Positive for shortness of breath;dyspnea at rest;dyspnea on exertion;mucoid expectorant;cough  Cardiovascular:  Negative    Gastroenterology: Negative    Genitourinary:  Negative    Musculoskeletal:  Negative    Neurologic:  Negative    Psychiatric:  Positive for anxiety  Heme/Lymph/Imm:  Positive for allergies  Endocrine:         PHYSICAL EXAM:      BP: 127/84 Pulse: 79     SpO2: 98 %      Vital Signs with Ranges  Pulse:  [79] 79  BP: (127)/(84) 127/84  SpO2:  [98 %] 98 %  161 lbs 0 oz      Wt Readings from Last 4 Encounters:   22 73 kg (161 lb)   22 73 kg (161 lb)   22 71.7 kg (158 lb)   21 72.5 kg (159 lb 14.4 oz)       Constitutional: awake, alert, no distress  Eyes: sclera nonicteric  ENT: trachea midline, healed trach scar  Respiratory: clear bilat,   Cardiovascular: regular, II/VI apical murmur. JVP <10cm, v-wave is prominent  GI: nondistended, nontender, bowel sounds present  Lymph/Hematologic: no lymphadenopathy  Skin: dry, no rash trace edema  Musculoskeletal: grossly normal muscle bulk and tone  Neurologic: no focal deficits  Neuropsychiatric: appropriate affect    Recent Lab Results:  Last echo 21    1. Left ventricular systolic function is normal. The ejection fraction is  estimated at 60-65%.  2. The right ventricle is moderately dilated. The right ventricular systolic  function is midly reduced.  3. There is moderate biatrial enlargement.  4. There is mod-severe (3-4+) tricuspid regurgitation.  Compared to prior, no  change.    LIPID RESULTS:  Lab Results   Component Value Date    CHOL 134 06/14/2021    HDL 53 06/14/2021    LDL 67 06/14/2021    TRIG 71 06/14/2021       LIVER ENZYME RESULTS:  Lab Results   Component Value Date    AST 27 04/04/2019    ALT 22 06/14/2021       CBC RESULTS:  Lab Results   Component Value Date    WBC 5.3 06/14/2021    RBC 4.33 06/14/2021    HGB 12.6 03/11/2022    HGB 12.8 06/14/2021    HCT 40.1 06/14/2021    MCV 93 06/14/2021    MCH 29.6 06/14/2021    MCHC 31.9 06/14/2021    RDW 13.5 06/14/2021     06/14/2021       BMP RESULTS:  Lab Results   Component Value Date     04/11/2022     06/23/2021    POTASSIUM 3.5 04/11/2022    POTASSIUM 4.5 06/23/2021    CHLORIDE 102 04/11/2022    CHLORIDE 104 06/23/2021    CO2 33 (H) 04/11/2022    CO2 31 06/23/2021    ANIONGAP 4 04/11/2022    ANIONGAP 3 06/23/2021    GLC 90 04/11/2022    GLC 86 06/23/2021    BUN 24 04/11/2022    BUN 30 06/23/2021    CR 1.18 (H) 04/11/2022    CR 1.29 (H) 06/23/2021    GFRESTIMATED 47 (L) 04/11/2022    GFRESTIMATED 39 (L) 06/23/2021    GFRESTBLACK 46 (L) 06/23/2021    BESS 9.3 04/11/2022    BESS 9.2 06/23/2021        A1C RESULTS:  Lab Results   Component Value Date    A1C 5.6 01/30/2019    A1C 5.3 01/04/2019       INR RESULTS:  Lab Results   Component Value Date    INR 2.4 (H) 04/06/2022    INR 2.5 (H) 03/25/2022    INR 2.43 (H) 03/11/2022    INR 2.30 (H) 07/09/2021    INR 2.40 (H) 06/10/2021           CC  Xochitl Eden, APRN CNP  [address]  [place] [id number]

## 2022-04-11 NOTE — LETTER
4/11/2022    Sharmaine Burks MD  [address]  [facility] [address]    RE: Priya Funez       Dear Colleague,     I had the pleasure of seeing Priya Funez in the [facility].  [facility] Progress note    Assessment:      81-year-old woman with chronic diastolic heart failure and RV dysfunction with moderate to severe tricuspid regurgitation as well as CKD, hypertension and chronic A. fib status post AV node ablation and pacemaker placement here for routine follow-up and notes worsening exertional dyspnea.  Symptoms are clearly worse when she has extra salt load.      1.  Chronic diastolic heart failure/HFpEF/right heart failure :  LVEF 60-65%, LV normal size/function, RV mild to moderately dilated, RV systolic function mildly decreased.               - NYHA class III, stage C              - Fluid status : euvolemic              - Diuretic regimen : torsemide to 30mg 4-5 days per week and 20mg other days, based on symptoms per patient.               - Aldosterone antagonist : none, could consider in future.               - Blood pressure : controlled  2. Chronic atrial fibrillation s/p AV node ablation 1/2019 - stable, asymptomatic. Most recent device interrogation was stable in Sept 2021.               - Continue routine follow up with EP and device clinic. Last device check 3/18/22, normal device function              - [id number] score 3 (age, female, HF)              - Continue warfarin for thromboembolic prophylaxis  3. Emergent aortic dissection repair 1/4/19              - CT 6 months after surgery for surveillance completed 8/7/2019 which was stable, showing similar appearance as post repair.   4. Hypertension - controlled, continue current medications  5. Obesity - counseled patient on weight loss strategies.  6. Mild mitral regurgitation  7. Moderate to severe tricuspid regurgitation 6/14/21 echo  8.  CKD III, stable  9.  Epistaxis, was in ED in March. No recurrence.  "Discussed stopping asa. She is tolerating at this time but will stop if she has bleeding recurrence.     Plan:  1. Schedule echocardiogram to follow up valvular heart disease, EF aorta  2. Continue low Na diet.  3. Continue current diuretic regimen.  4. Continue daily weights.   5. Follow up with Xochitl Eden CNP in core clinic in about 2 months.     HPI:     Priya Funez is a very pleasant 81 year old female with a history of HFpEF, atrial fibrillation s/p AV leo ablation with PPM implantation 1/11/2019, emergent aortic dissection repair 1/4/19, hypertension and obesity.     She is here for follow up of HFpEF. She has noticed that she is very sodium-sensitive and when she has more sodium she will wake up the following day and feel more short of breath and cough up \"mucous\".  She takes 20mg torsemide most days.        She walks in [employer] almost every day. Her exercise tolerance is steady. She does not feel like she is making improvement, but she not getting weaker. She walks about 20-30 min/day.       CURRENT MEDICATIONS:  Current Outpatient Medications   Medication Sig Dispense Refill     Acetaminophen 325 MG CAPS Take 325-650 mg by mouth every 4 hours as needed       albuterol (PROAIR HFA/PROVENTIL HFA/VENTOLIN HFA) 108 (90 Base) MCG/ACT inhaler Inhale 2 puffs into the lungs every 6 hours as needed for shortness of breath / dyspnea or wheezing 18 g 0     aspirin (ASA) 81 MG EC tablet Take 81 mg by mouth every 24 hours       cholecalciferol 25 MCG (1000 UT) TABS Take 1,000 Units by mouth daily        cyanocobalamin (VITAMIN B-12) 100 MCG tablet Take 1,000 mcg by mouth daily        escitalopram (LEXAPRO) 10 MG tablet Take 1 tablet (10 mg) by mouth daily 90 tablet 3     metoprolol tartrate (LOPRESSOR) 25 MG tablet Take 1 tablet (25 mg) by mouth 2 times daily 180 tablet 3     mirtazapine (REMERON) 15 MG tablet Take 1 tablet (15 mg) by mouth At Bedtime 90 tablet 3     torsemide (DEMADEX) 10 MG tablet Take " 3 tablets (30 mg) by mouth daily 180 tablet 3     warfarin ANTICOAGULANT (COUMADIN) 2 MG tablet Take 2.5 tablets (5 mg) on  and  and take 2 tablets (4 mg) all other days or as directed by the INR clinic 200 tablet 0       ALLERGIES     Allergies   Allergen Reactions     Amoxicillin Swelling     Face and body     Ciprofloxacin Hives       PAST MEDICAL, SURGICAL, FAMILY, SOCIAL HISTORY:  History was reviewed and updated as needed, see medical record.  Never-smoker  Her mom  at age 95 while Priya Funez was recovering from her dissection    REVIEW OF SYSTEMS:  Skin:  Negative     Eyes:  Positive for glasses  ENT:  Negative    Respiratory:  Positive for shortness of breath;dyspnea at rest;dyspnea on exertion;mucoid expectorant;cough  Cardiovascular:  Negative    Gastroenterology: Negative    Genitourinary:  Negative    Musculoskeletal:  Negative    Neurologic:  Negative    Psychiatric:  Positive for anxiety  Heme/Lymph/Imm:  Positive for allergies  Endocrine:         PHYSICAL EXAM:      BP: 127/84 Pulse: 79     SpO2: 98 %      Vital Signs with Ranges  Pulse:  [79] 79  BP: (127)/(84) 127/84  SpO2:  [98 %] 98 %  161 lbs 0 oz      Wt Readings from Last 4 Encounters:   22 73 kg (161 lb)   22 73 kg (161 lb)   22 71.7 kg (158 lb)   21 72.5 kg (159 lb 14.4 oz)       Constitutional: awake, alert, no distress  Eyes: sclera nonicteric  ENT: trachea midline, healed trach scar  Respiratory: clear bilat,   Cardiovascular: regular, II/VI apical murmur. JVP <10cm, v-wave is prominent  GI: nondistended, nontender, bowel sounds present  Lymph/Hematologic: no lymphadenopathy  Skin: dry, no rash trace edema  Musculoskeletal: grossly normal muscle bulk and tone  Neurologic: no focal deficits  Neuropsychiatric: appropriate affect    Recent Lab Results:  Last echo 21    1. Left ventricular systolic function is normal. The ejection fraction is  estimated at 60-65%.  2. The right ventricle is  moderately dilated. The right ventricular systolic  function is midly reduced.  3. There is moderate biatrial enlargement.  4. There is mod-severe (3-4+) tricuspid regurgitation.  Compared to prior, no change.    LIPID RESULTS:  Lab Results   Component Value Date    CHOL 134 06/14/2021    HDL 53 06/14/2021    LDL 67 06/14/2021    TRIG 71 06/14/2021       LIVER ENZYME RESULTS:  Lab Results   Component Value Date    AST 27 04/04/2019    ALT 22 06/14/2021       CBC RESULTS:  Lab Results   Component Value Date    WBC 5.3 06/14/2021    RBC 4.33 06/14/2021    HGB 12.6 03/11/2022    HGB 12.8 06/14/2021    HCT 40.1 06/14/2021    MCV 93 06/14/2021    MCH 29.6 06/14/2021    MCHC 31.9 06/14/2021    RDW 13.5 06/14/2021     06/14/2021       BMP RESULTS:  Lab Results   Component Value Date     04/11/2022     06/23/2021    POTASSIUM 3.5 04/11/2022    POTASSIUM 4.5 06/23/2021    CHLORIDE 102 04/11/2022    CHLORIDE 104 06/23/2021    CO2 33 (H) 04/11/2022    CO2 31 06/23/2021    ANIONGAP 4 04/11/2022    ANIONGAP 3 06/23/2021    GLC 90 04/11/2022    GLC 86 06/23/2021    BUN 24 04/11/2022    BUN 30 06/23/2021    CR 1.18 (H) 04/11/2022    CR 1.29 (H) 06/23/2021    GFRESTIMATED 47 (L) 04/11/2022    GFRESTIMATED 39 (L) 06/23/2021    GFRESTBLACK 46 (L) 06/23/2021    BESS 9.3 04/11/2022    BESS 9.2 06/23/2021        A1C RESULTS:  Lab Results   Component Value Date    A1C 5.6 01/30/2019    A1C 5.3 01/04/2019       INR RESULTS:  Lab Results   Component Value Date    INR 2.4 (H) 04/06/2022    INR 2.5 (H) 03/25/2022    INR 2.43 (H) 03/11/2022    INR 2.30 (H) 07/09/2021    INR 2.40 (H) 06/10/2021         CC  Xochitl Eden, APRN CNP  [address]  [place] [address]    Thank you for allowing me to participate in the care of your patient.      Sincerely,     Becky Kwok MD     [facility]

## 2022-04-21 DIAGNOSIS — I48.20 CHRONIC ATRIAL FIBRILLATION (H): ICD-10-CM

## 2022-04-22 RX ORDER — WARFARIN SODIUM 2 MG/1
TABLET ORAL
Qty: 185 TABLET | Refills: 1 | Status: SHIPPED | OUTPATIENT
Start: 2022-04-22 | End: 2022-10-17

## 2022-04-22 NOTE — TELEPHONE ENCOUNTER
ANTICOAGULATION MANAGEMENT:  Medication Refill    Anticoagulation Summary  As of 4/6/2022    Warfarin maintenance plan:  4 mg (2 mg x 2) every day   Next INR check:  5/4/2022   Target end date:  Indefinite    Indications    Chronic atrial fibrillation (H) [I48.20]  Current use of long term anticoagulation [Z79.01]             Anticoagulation Care Providers     Provider Role Specialty Phone number    Sharmaine Burks MD Referring Internal Medicine [phone]          Visit with referring provider/group within last year: Yes    [facility] referral signed within last year: Yes    Priya meets all criteria for refill (current [facility] referral, office visit with referring provider/group in last year, lab monitoring up to date or not exceeding 2 weeks overdue). Rx instructions and quantity match patient's current dosing plan. Warfarin 90 day supply with 1 refill granted per [facility] protocol     Lucretia Go RN  Anticoagulation Clinic

## 2022-05-02 NOTE — PROGRESS NOTES
"Thoracic Surgery:  /77   Pulse 76   Temp 100.9  F (38.3  C)   Resp 22   Ht 1.626 m (5' 4\")   Wt 96.6 kg (212 lb 15.4 oz)   SpO2 100%   BMI 36.56 kg/m    Vented, sedated, art line being placed now    Discussed with Maggy, CV surgery NP and bedside RN. Met with patient's , dtr and son to explain tracheostomy procedure, indications, expected course, answer questions.  and family in favor of proceeding with tracheostomy tomorrow with Dr. Mcclelland. Consent signed. Will try to coordinate with Gen Surgery PEG placement.     Holding warfarin- bridging with heparin- will order heparin drip to be held 6 hours pre-procedure    Savannah Glass PA-C with Dr. rByson Mcclelland  [facility]  Cell [phone]      " Follow up at the [facility] in 6wks   [phone]

## 2022-05-06 ENCOUNTER — ANTICOAGULATION THERAPY VISIT (OUTPATIENT)
Dept: ANTICOAGULATION | Facility: CLINIC | Age: 82
End: 2022-05-06

## 2022-05-06 ENCOUNTER — LAB (OUTPATIENT)
Dept: LAB | Facility: CLINIC | Age: 82
End: 2022-05-06
Payer: COMMERCIAL

## 2022-05-06 DIAGNOSIS — I48.20 CHRONIC ATRIAL FIBRILLATION (H): ICD-10-CM

## 2022-05-06 DIAGNOSIS — I48.20 CHRONIC ATRIAL FIBRILLATION (H): Primary | ICD-10-CM

## 2022-05-06 DIAGNOSIS — Z79.01 CURRENT USE OF LONG TERM ANTICOAGULATION: ICD-10-CM

## 2022-05-06 LAB — INR BLD: 2.8 (ref 0.9–1.1)

## 2022-05-06 PROCEDURE — 36416 COLLJ CAPILLARY BLOOD SPEC: CPT

## 2022-05-06 PROCEDURE — 85610 PROTHROMBIN TIME: CPT

## 2022-05-06 NOTE — PROGRESS NOTES
ANTICOAGULATION MANAGEMENT     Priya MARISOL Funez 81 year old female is on warfarin with therapeutic INR result. (Goal INR 2.0-3.0)    Recent labs: (last 7 days)     05/06/22  1517   INR 2.8*       ASSESSMENT       Source(s): Chart Review and Patient/Caregiver Call       Warfarin doses taken: Warfarin taken as instructed    Diet: No new diet changes identified    New illness, injury, or hospitalization: No    Medication/supplement changes: None noted    Signs or symptoms of bleeding or clotting: No    Previous INR: Therapeutic last 2(+) visits    Additional findings: None       PLAN     Recommended plan for no diet, medication or health factor changes affecting INR     Dosing Instructions: continue your current warfarin dose with next INR in 4 weeks       Summary  As of 5/6/2022    Full warfarin instructions:  4 mg every day   Next INR check:  6/3/2022             Telephone call with Priya who verbalizes understanding and agrees to plan    Lab visit scheduled    Education provided: Please call back if any changes to your diet, medications or how you've been taking warfarin    Plan made per [facility] anticoagulation protocol    Christine Adames RN  Anticoagulation Clinic  5/6/2022    _______________________________________________________________________     Anticoagulation Episode Summary     Current INR goal:  2.0-3.0   TTR:  84.6 % (1 y)   Target end date:  Indefinite   Send INR reminders to:  [facility]    Indications    Chronic atrial fibrillation (H) [I48.20]  Current use of long term anticoagulation [Z79.01]           Comments:           Anticoagulation Care Providers     Provider Role Specialty Phone number    Sharmaine Burks MD Referring Internal Medicine [phone]

## 2022-06-02 ENCOUNTER — HOSPITAL ENCOUNTER (OUTPATIENT)
Dept: CARDIOLOGY | Facility: CLINIC | Age: 82
Discharge: HOME | End: 2022-06-02
Attending: INTERNAL MEDICINE | Admitting: INTERNAL MEDICINE
Payer: COMMERCIAL

## 2022-06-02 DIAGNOSIS — I50.32 CHRONIC DIASTOLIC HEART FAILURE (H): ICD-10-CM

## 2022-06-02 LAB — LVEF ECHO: NORMAL

## 2022-06-02 PROCEDURE — 255N000002 HC RX 255 OP 636: Performed by: INTERNAL MEDICINE

## 2022-06-02 PROCEDURE — 999N000208 ECHOCARDIOGRAM COMPLETE

## 2022-06-02 PROCEDURE — 93306 TTE W/DOPPLER COMPLETE: CPT | Mod: 26 | Performed by: INTERNAL MEDICINE

## 2022-06-02 RX ADMIN — HUMAN ALBUMIN MICROSPHERES AND PERFLUTREN 9 ML: 10; .22 INJECTION, SOLUTION INTRAVENOUS at 13:25

## 2022-06-03 ENCOUNTER — LAB (OUTPATIENT)
Dept: LAB | Facility: CLINIC | Age: 82
End: 2022-06-03
Payer: COMMERCIAL

## 2022-06-03 DIAGNOSIS — I48.20 CHRONIC ATRIAL FIBRILLATION (H): ICD-10-CM

## 2022-06-03 LAB — INR BLD: 2.4 (ref 0.9–1.1)

## 2022-06-03 PROCEDURE — 85610 PROTHROMBIN TIME: CPT

## 2022-06-03 PROCEDURE — 36416 COLLJ CAPILLARY BLOOD SPEC: CPT

## 2022-06-06 ENCOUNTER — ANTICOAGULATION THERAPY VISIT (OUTPATIENT)
Dept: ANTICOAGULATION | Facility: CLINIC | Age: 82
End: 2022-06-06
Payer: COMMERCIAL

## 2022-06-06 DIAGNOSIS — Z79.01 CURRENT USE OF LONG TERM ANTICOAGULATION: ICD-10-CM

## 2022-06-06 DIAGNOSIS — I48.20 CHRONIC ATRIAL FIBRILLATION (H): Primary | ICD-10-CM

## 2022-06-06 NOTE — PROGRESS NOTES
ANTICOAGULATION MANAGEMENT     Priya DAMON Armin 81 year old female is on warfarin with therapeutic INR result. (Goal INR 2.0-3.0)    Recent labs: (last 7 days)     06/03/22  1525   INR 2.4*       ASSESSMENT       Source(s): Chart Review and Patient/Caregiver Call       Warfarin doses taken: Warfarin taken as instructed    Diet: No new diet changes identified    New illness, injury, or hospitalization: No    Medication/supplement changes: None noted    Signs or symptoms of bleeding or clotting: Yes: she has a spot on her leg she thinks is a bruise.  She sees cardiology next week and will ask them to make sure    Previous INR: Therapeutic last 2(+) visits    Additional findings: None       PLAN     Recommended plan for no diet, medication or health factor changes affecting INR     Dosing Instructions: continue your current warfarin dose with next INR in 4 weeks       Summary  As of 6/6/2022    Full warfarin instructions:  4 mg every day   Next INR check:  6/30/2022             Telephone call with Priya who verbalizes understanding and agrees to plan    Lab visit scheduled    Education provided: Please call back if any changes to your diet, medications or how you've been taking warfarin    Plan made per [facility] anticoagulation protocol    Christine Adames RN  Anticoagulation Clinic  6/6/2022    _______________________________________________________________________     Anticoagulation Episode Summary     Current INR goal:  2.0-3.0   TTR:  86.1 % (1 y)   Target end date:  Indefinite   Send INR reminders to:  [facility]    Indications    Chronic atrial fibrillation (H) [I48.20]  Current use of long term anticoagulation [Z79.01]           Comments:           Anticoagulation Care Providers     Provider Role Specialty Phone number    Sharmaine Burks MD Referring Internal Medicine [phone]           You can access the FollowMyHealth Patient Portal offered by [facility] by registering at the following website: [email or web address]. By joining [employer]’s FollowMyHealth portal, you will also be able to view your health information using other applications (apps) compatible with our system.

## 2022-06-13 ENCOUNTER — OFFICE VISIT (OUTPATIENT)
Dept: CARDIOLOGY | Facility: CLINIC | Age: 82
End: 2022-06-13

## 2022-06-13 ENCOUNTER — LAB (OUTPATIENT)
Dept: LAB | Facility: CLINIC | Age: 82
End: 2022-06-13
Payer: COMMERCIAL

## 2022-06-13 VITALS
HEART RATE: 79 BPM | OXYGEN SATURATION: 96 % | DIASTOLIC BLOOD PRESSURE: 78 MMHG | BODY MASS INDEX: 27.42 KG/M2 | HEIGHT: 64 IN | WEIGHT: 160.6 LBS | SYSTOLIC BLOOD PRESSURE: 128 MMHG

## 2022-06-13 DIAGNOSIS — I07.1 TRICUSPID VALVE INSUFFICIENCY, UNSPECIFIED ETIOLOGY: ICD-10-CM

## 2022-06-13 DIAGNOSIS — N18.31 STAGE 3A CHRONIC KIDNEY DISEASE (H): ICD-10-CM

## 2022-06-13 DIAGNOSIS — I48.20 CHRONIC ATRIAL FIBRILLATION (H): ICD-10-CM

## 2022-06-13 DIAGNOSIS — I10 ESSENTIAL HYPERTENSION: ICD-10-CM

## 2022-06-13 DIAGNOSIS — I50.812 CHRONIC RIGHT-SIDED HEART FAILURE (H): ICD-10-CM

## 2022-06-13 DIAGNOSIS — I50.812 CHRONIC RIGHT-SIDED HEART FAILURE (H): Primary | ICD-10-CM

## 2022-06-13 DIAGNOSIS — I50.32 CHRONIC DIASTOLIC CONGESTIVE HEART FAILURE (H): ICD-10-CM

## 2022-06-13 DIAGNOSIS — Z79.01 CURRENT USE OF LONG TERM ANTICOAGULATION: ICD-10-CM

## 2022-06-13 LAB
ANION GAP SERPL CALCULATED.3IONS-SCNC: 5 MMOL/L (ref 3–14)
BUN SERPL-MCNC: 23 MG/DL (ref 7–30)
CALCIUM SERPL-MCNC: 9.1 MG/DL (ref 8.5–10.1)
CHLORIDE BLD-SCNC: 102 MMOL/L (ref 94–109)
CO2 SERPL-SCNC: 32 MMOL/L (ref 20–32)
CREAT SERPL-MCNC: 1.13 MG/DL (ref 0.52–1.04)
GFR SERPL CREATININE-BSD FRML MDRD: 49 ML/MIN/1.73M2
GLUCOSE BLD-MCNC: 93 MG/DL (ref 70–99)
POTASSIUM BLD-SCNC: 3.9 MMOL/L (ref 3.4–5.3)
SODIUM SERPL-SCNC: 139 MMOL/L (ref 133–144)

## 2022-06-13 PROCEDURE — 99215 OFFICE O/P EST HI 40 MIN: CPT | Performed by: NURSE PRACTITIONER

## 2022-06-13 PROCEDURE — 80048 BASIC METABOLIC PNL TOTAL CA: CPT | Performed by: NURSE PRACTITIONER

## 2022-06-13 PROCEDURE — 36415 COLL VENOUS BLD VENIPUNCTURE: CPT | Performed by: NURSE PRACTITIONER

## 2022-06-13 RX ORDER — SPIRONOLACTONE 25 MG/1
25 TABLET ORAL DAILY
Qty: 30 TABLET | Refills: 1 | Status: SHIPPED | OUTPATIENT
Start: 2022-06-13 | End: 2022-08-10

## 2022-06-13 NOTE — PROGRESS NOTES
Cardiology Clinic Progress Note  Priya Funez MRN# [id number]   YOB: 1940 Age: 81 year old   Primary Cardiologist: Dr. Kwok  Reason for visit: Cardiology follow up             Assessment and Plan:   Priya Funez is a very pleasant 81 year old female who I am seeing today for cardiology follow up.     1.  Chronic diastolic heart failure/HFpEF/right heart failure with severe tricuspid regurgitation:  LVEF 55-60%, RV moderately to severely dilated, RVIDD at base 5.7cm, mild decrease in RV systolic function and severe tricuspid regurgitation.               - NYHA class III, stage C              - Fluid status : hypervolemic              - Diuretic regimen : torsemide to 30mg most days but some days 20mg.              - Aldosterone antagonist : START spironolactone 25mg daily.              - Blood pressure : controlled  2. Chronic atrial fibrillation s/p AV node ablation 1/2019 - stable, asymptomatic. Most recent device interrogation was stable in 3/2022.               - Continue routine follow up with EP and device clinic              - [id number] score 3 (age, female, HF)              - Continue warfarin for thromboembolic prophylaxis  3. Emergent aortic dissection repair 1/4/19  4. Hypertension - controlled, continue current medications  5. Obesity - counseled patient on weight loss strategies.  6. Mild mitral regurgitation  7. Severe tricuspid regurgitation     I saw Priya for cardiology follow up today. She continues to do okay, complaints of dyspnea which waxes and wanes, PND and abdominal bloating, notes worse after eating increased sodium. Reviewed echocardiogram results from 6/2022 which showed LVEF 55-60%, RV moderately to severely dilated, RVIDD at base 5.7cm, mild decrease in RV systolic function, severe TR with malcoaptation of the TV leaflet, IVC dilated. When compared to prior echo from 6/2021, increased RV dilation and worsening TR. She appears hypervolemic on exam and with  continue HF symptoms.     Recommend further optimization of HF therapy to help with volume status. Given worsening RV dilation recommend starting spironolactone 25mg daily. At follow up will consider further titration of torsemide, though patient continues to be resistant to further diuretic titration given urinary frequency.     Will consider further testing with cardiac MRI in the future to further evaluate RV and TR.     Changes today: START spironolactone 25mg daily     Follow up plan:     BMP after OV today    BMP in 1 week to monitor after initiation of spironolactone    Cardiology follow up with me on 6/30/22        History of Presenting Illness:    Priya Funez is a very pleasant 81 year old female with a history of HFpEF, right heart failure, atrial fibrillation s/p AV leo ablation with PPM implantation 1/11/2019, emergent aortic dissection repair 1/4/19, hypertension and obesity.      Patient presented in January with dissecting aortic aneurysm, she was hospitalized from 1/4/19-1/29/19. Patient had a complicated hospital course. There was prolonged ischemia to the SMA and right renal regions. She required tracheostomy placement and was discharged to [facility] on ventilatory support. Patients tracheostomy has been discontinued and she transitioned home the beginning of June.      Post hospitalization she was evaluated by Dr. Morales in July 2019 at which point patient was noted to be volume up on exam. Recommended increasing her torsemide to 20mg BID. Lymphedema clinic referral placed and CORE consult ordered. I first met patient for CORE enrollment 7/9/19. She has been following closely in CORE clinic since.      She underwent surveillance CT on 8/7/2019 for aortic dissection repair which was stable, showing similar appearance as post repair.      She last saw Dr. Kwok 4/2022 at which point she was doing okay. Recommended echocardiogram. No medication changes.     Echocardiogram 6/2022 showed LVEF  55-60%, RV moderately to severely dilated, RVIDD at base 5.7cm, mild decrease in RV systolic function, severe TR with malcoaptation of the TV leaflet, IVC dilated. When compared to prior echo from 6/2021, increased RV dilation and worsening TR.     Patient is here today for cardiology follow up.     Patient reports feeling good. Monitoring weights daily a home, states her weight was down to 156# but notes she had 2 graduations this weekend. Shares that her breathing waxes and wanes, she particularly notes increased dyspnea when eating more salt. Does have complaints of orthopnea or PND. Also has abdominal bloating which comes and goes. Denies chest pain or chest tightness. Denies dizziness, lightheadedness or other presyncopal symptoms. Denies tachycardia or palpitations. Taking medications daily. States she has been taking torsemide 30mg daily.     Labs from April 2022 showed stable kidney function and electrolytes, creatinine 1.18. Blood pressure 128/78 and HR 79 in clinic today.    Appetite good. Really trying to monitor her sodium intake. Going to the mall 3-4 days a week to walk, typically walking 15 mins. Denies tobacco use. Occasionally will share a little beer with her .         Social History    , 4 children, 8 grandchildren, enjoys her gardens in the summer.   Social History     Socioeconomic History     Marital status:      Spouse name: Not on file     Number of children: Not on file     Years of education: Not on file     Highest education level: Not on file   Occupational History     Occupation: Retired - customer service   Tobacco Use     Smoking status: Never Smoker     Smokeless tobacco: Never Used   Substance and Sexual Activity     Alcohol use: Not Currently     Drug use: No     Sexual activity: Not on file   Other Topics Concern     Parent/sibling w/ CABG, MI or angioplasty before 65F 55M? Not Asked   Social History Narrative    .    Lives in home with . Fully  independent.    4 adult children.    8 grandchildren.     Social Determinants of Health     Financial Resource Strain: Not on file   Food Insecurity: Not on file   Transportation Needs: Not on file   Physical Activity: Not on file   Stress: Not on file   Social Connections: Not on file   Intimate Partner Violence: Not on file   Housing Stability: Not on file          Review of Systems:    ROS: 10 point ROS neg other than the symptoms noted above in the HPI.         Physical Exam:   Vitals: There were no vitals taken for this visit.   Wt Readings from Last 4 Encounters:   04/11/22 73 kg (161 lb)   04/04/22 73 kg (161 lb)   03/11/22 71.7 kg (158 lb)   12/06/21 72.5 kg (159 lb 14.4 oz)     GEN: well nourished, in no acute distress.  HEENT:  Pupils equal, round. Sclerae nonicteric.   NECK: Supple, no masses appreciated. Elevated JVP.   C/V:  Regular rate and rhythm, no murmur, rub or gallop.    RESP: Respirations are unlabored. Clear to auscultation bilaterally without wheezing, rales, or rhonchi.  GI: Abdomen soft, obese, nontender.  EXTREM: Trace bilateral LE edema.  NEURO: Alert and oriented, cooperative.  SKIN: Warm and dry.        Data:     LIPID RESULTS:  Lab Results   Component Value Date    CHOL 134 06/14/2021    HDL 53 06/14/2021    LDL 67 06/14/2021    TRIG 71 06/14/2021     LIVER ENZYME RESULTS:  Lab Results   Component Value Date    AST 27 04/04/2019    ALT 22 06/14/2021     CBC RESULTS:  Lab Results   Component Value Date    WBC 5.3 06/14/2021    RBC 4.33 06/14/2021    HGB 12.6 03/11/2022    HGB 12.8 06/14/2021    HCT 40.1 06/14/2021    MCV 93 06/14/2021    MCH 29.6 06/14/2021    MCHC 31.9 06/14/2021    RDW 13.5 06/14/2021     06/14/2021     BMP RESULTS:  Lab Results   Component Value Date     04/11/2022     06/23/2021    POTASSIUM 3.5 04/11/2022    POTASSIUM 4.5 06/23/2021    CHLORIDE 102 04/11/2022    CHLORIDE 104 06/23/2021    CO2 33 (H) 04/11/2022    CO2 31 06/23/2021    ANIONGAP 4  04/11/2022    ANIONGAP 3 06/23/2021    GLC 90 04/11/2022    GLC 86 06/23/2021    BUN 24 04/11/2022    BUN 30 06/23/2021    CR 1.18 (H) 04/11/2022    CR 1.29 (H) 06/23/2021    GFRESTIMATED 47 (L) 04/11/2022    GFRESTIMATED 39 (L) 06/23/2021    GFRESTBLACK 46 (L) 06/23/2021    BESS 9.3 04/11/2022    BESS 9.2 06/23/2021      A1C RESULTS:  Lab Results   Component Value Date    A1C 5.6 01/30/2019    A1C 5.3 01/04/2019     INR RESULTS:  Lab Results   Component Value Date    INR 2.4 (H) 06/03/2022    INR 2.8 (H) 05/06/2022    INR 2.43 (H) 03/11/2022    INR 2.30 (H) 07/09/2021    INR 2.40 (H) 06/10/2021          Medications     Current Outpatient Medications   Medication Sig Dispense Refill     Acetaminophen 325 MG CAPS Take 325-650 mg by mouth every 4 hours as needed       albuterol (PROAIR HFA/PROVENTIL HFA/VENTOLIN HFA) 108 (90 Base) MCG/ACT inhaler Inhale 2 puffs into the lungs every 6 hours as needed for shortness of breath / dyspnea or wheezing 18 g 0     aspirin (ASA) 81 MG EC tablet Take 81 mg by mouth every 24 hours       cholecalciferol 25 MCG (1000 UT) TABS Take 1,000 Units by mouth daily        cyanocobalamin (VITAMIN B-12) 100 MCG tablet Take 1,000 mcg by mouth daily        escitalopram (LEXAPRO) 10 MG tablet Take 1 tablet (10 mg) by mouth daily 90 tablet 3     metoprolol tartrate (LOPRESSOR) 25 MG tablet Take 1 tablet (25 mg) by mouth 2 times daily 180 tablet 3     mirtazapine (REMERON) 15 MG tablet Take 1 tablet (15 mg) by mouth At Bedtime 90 tablet 3     torsemide (DEMADEX) 10 MG tablet Take 3 tablets (30 mg) by mouth daily 180 tablet 3     warfarin ANTICOAGULANT (COUMADIN) 2 MG tablet Current dose: 4 mg (2 tablets) daily or as instructed by ACC team. 185 tablet 1          Past Medical History     Past Medical History:   Diagnosis Date     Benign essential hypertension      Chronic atrial fibrillation (H)     s/p AV node ablation January, 2019     Chronic diastolic heart failure (H)      Chronic kidney  disease, stage III (moderate) (H)      Past Surgical History:   Procedure Laterality Date     ANGIOGRAM Right 01/04/2019    Procedure: DIAGONSTIC ANGIOGRAM OF SMA;  Surgeon: Rigo Jennings MD;  Location:  OR     BIOPSY BREAST       BYPASS GRAFT ARTERY CORONARY, REPAIR VALVE AORTIC, COMBINED N/A 01/04/2019    Procedure: AORTIC DISSECTION REPAIR WITH GRAFT- HEMASHIELD PLATINUM WOVEN DOUBLE VELOR 4 SIDE ARM VASCULAR GRAFT, D:35 X 12 X 10 X 10MM/ L:50CM;  Surgeon: Jose Winslow MD;  Location:  OR     EP ABLATION AV NODE N/A 01/11/2019    Procedure: EP Ablation AV Node;  Surgeon: Tsering Davey MD;  Location:  HEART CARDIAC CATH LAB     EP PACEMAKER Left 01/11/2019    Procedure: EP Pacemaker;  Surgeon: Tsering Davey MD;  Location:  HEART CARDIAC CATH LAB     THYROID SURGERY      benign mass removed     TRACHEOSTOMY N/A 01/25/2019    Procedure: TRACHEOSTOMY  (DR MCCLELLAND);  Surgeon: Bryson Mcclelland MD;  Location:  OR     TUBAL LIGATION       Family History   Problem Relation Age of Onset     Diabetes No family hx of      Myocardial Infarction No family hx of      Cerebrovascular Disease No family hx of      Coronary Artery Disease Early Onset No family hx of      Breast Cancer No family hx of      Colon Cancer No family hx of      Ovarian Cancer No family hx of      Ataxia Mother      Heart Disease Father             Allergies   Amoxicillin and Ciprofloxacin    40 minutes spent on the date of the encounter doing chart review, history and exam, documentation and further activities as noted above      TAMRA Ryan [facility]  Pager: [phone]

## 2022-06-13 NOTE — PATIENT INSTRUCTIONS
START spironolactone 25mg daily (1 tablet daily)  Continue monitor weight, limit salt  Get labs after office today  Follow up with Xochitl in 2-3 weeks  Please call with any questions/concerns [phone]

## 2022-06-13 NOTE — LETTER
6/13/2022    Sharmaine Burks MD  [address] [facility] [address]    RE: Priya Funez       Dear Colleague,     I had the [facility] of seeing [facility] in the [facility].  Cardiology Clinic Progress Note  Priya Funez MRN# [id number]   YOB: 1940 Age: 81 year old   Primary Cardiologist: Dr. Kwok  Reason for visit: Cardiology follow up             Assessment and Plan:   Priya Funez is a very pleasant 81 year old female who I am seeing today for cardiology follow up.     1.  Chronic diastolic heart failure/HFpEF/right heart failure with severe tricuspid regurgitation:  LVEF 55-60%, RV moderately to severely dilated, RVIDD at base 5.7cm, mild decrease in RV systolic function and severe tricuspid regurgitation.               - NYHA class III, stage C              - Fluid status : hypervolemic              - Diuretic regimen : torsemide to 30mg most days but some days 20mg.              - Aldosterone antagonist : START spironolactone 25mg daily.              - Blood pressure : controlled  2. Chronic atrial fibrillation s/p AV node ablation 1/2019 - stable, asymptomatic. Most recent device interrogation was stable in 3/2022.               - Continue routine follow up with EP and device clinic              - [id number] score 3 (age, female, HF)              - Continue warfarin for thromboembolic prophylaxis  3. Emergent aortic dissection repair 1/4/19  4. Hypertension - controlled, continue current medications  5. Obesity - counseled patient on weight loss strategies.  6. Mild mitral regurgitation  7. Severe tricuspid regurgitation     I saw Priya for cardiology follow up today. She continues to do okay, complaints of dyspnea which waxes and wanes, PND and abdominal bloating, notes worse after eating increased sodium. Reviewed echocardiogram results from 6/2022 which showed LVEF 55-60%, RV moderately to severely dilated, RVIDD at base 5.7cm, mild decrease  in RV systolic function, severe TR with malcoaptation of the TV leaflet, IVC dilated. When compared to prior echo from 6/2021, increased RV dilation and worsening TR. She appears hypervolemic on exam and with continue HF symptoms.     Recommend further optimization of HF therapy to help with volume status. Given worsening RV dilation recommend starting spironolactone 25mg daily. At follow up will consider further titration of torsemide, though patient continues to be resistant to further diuretic titration given urinary frequency.     Will consider further testing with cardiac MRI in the future to further evaluate RV and TR.     Changes today: START spironolactone 25mg daily     Follow up plan:     BMP after OV today    BMP in 1 week to monitor after initiation of spironolactone    Cardiology follow up with me on 6/30/22        History of Presenting Illness:    Priya Funez is a very pleasant 81 year old female with a history of HFpEF, right heart failure, atrial fibrillation s/p AV leo ablation with PPM implantation 1/11/2019, emergent aortic dissection repair 1/4/19, hypertension and obesity.      Patient presented in January with dissecting aortic aneurysm, she was hospitalized from 1/4/19-1/29/19. Patient had a complicated hospital course. There was prolonged ischemia to the SMA and right renal regions. She required tracheostomy placement and was discharged to [facility] on ventilatory support. Patients tracheostomy has been discontinued and she transitioned home the beginning of June.      Post hospitalization she was evaluated by Dr. Morales in July 2019 at which point patient was noted to be volume up on exam. Recommended increasing her torsemide to 20mg BID. Lymphedema clinic referral placed and CORE consult ordered. I first met patient for CORE enrollment 7/9/19. She has been following closely in CORE clinic since.      She underwent surveillance CT on 8/7/2019 for aortic dissection repair which was  stable, showing similar appearance as post repair.      She last saw Dr. Kwok 4/2022 at which point she was doing okay. Recommended echocardiogram. No medication changes.     Echocardiogram 6/2022 showed LVEF 55-60%, RV moderately to severely dilated, RVIDD at base 5.7cm, mild decrease in RV systolic function, severe TR with malcoaptation of the TV leaflet, IVC dilated. When compared to prior echo from 6/2021, increased RV dilation and worsening TR.     Patient is here today for cardiology follow up.     Patient reports feeling good. Monitoring weights daily a home, states her weight was down to 156# but notes she had 2 graduations this weekend. Shares that her breathing waxes and wanes, she particularly notes increased dyspnea when eating more salt. Does have complaints of orthopnea or PND. Also has abdominal bloating which comes and goes. Denies chest pain or chest tightness. Denies dizziness, lightheadedness or other presyncopal symptoms. Denies tachycardia or palpitations. Taking medications daily. States she has been taking torsemide 30mg daily.     Labs from April 2022 showed stable kidney function and electrolytes, creatinine 1.18. Blood pressure 128/78 and HR 79 in clinic today.    Appetite good. Really trying to monitor her sodium intake. Going to the mall 3-4 days a week to walk, typically walking 15 mins. Denies tobacco use. Occasionally will share a little beer with her .         Social History    , 4 children, 8 grandchildren, enjoys her gardens in the summer.   Social History     Socioeconomic History     Marital status:      Spouse name: Not on file     Number of children: Not on file     Years of education: Not on file     Highest education level: Not on file   Occupational History     Occupation: Retired - customer service   Tobacco Use     Smoking status: Never Smoker     Smokeless tobacco: Never Used   Substance and Sexual Activity     Alcohol use: Not Currently     Drug  use: No     Sexual activity: Not on file   Other Topics Concern     Parent/sibling w/ CABG, MI or angioplasty before 65F 55M? Not Asked   Social History Narrative    .    Lives in home with . Fully independent.    4 adult children.    8 grandchildren.     Social Determinants of Health     Financial Resource Strain: Not on file   Food Insecurity: Not on file   Transportation Needs: Not on file   Physical Activity: Not on file   Stress: Not on file   Social Connections: Not on file   Intimate Partner Violence: Not on file   Housing Stability: Not on file          Review of Systems:    ROS: 10 point ROS neg other than the symptoms noted above in the HPI.         Physical Exam:   Vitals: There were no vitals taken for this visit.   Wt Readings from Last 4 Encounters:   04/11/22 73 kg (161 lb)   04/04/22 73 kg (161 lb)   03/11/22 71.7 kg (158 lb)   12/06/21 72.5 kg (159 lb 14.4 oz)     GEN: well nourished, in no acute distress.  HEENT:  Pupils equal, round. Sclerae nonicteric.   NECK: Supple, no masses appreciated. Elevated JVP.   C/V:  Regular rate and rhythm, no murmur, rub or gallop.    RESP: Respirations are unlabored. Clear to auscultation bilaterally without wheezing, rales, or rhonchi.  GI: Abdomen soft, obese, nontender.  EXTREM: Trace bilateral LE edema.  NEURO: Alert and oriented, cooperative.  SKIN: Warm and dry.        Data:     LIPID RESULTS:  Lab Results   Component Value Date    CHOL 134 06/14/2021    HDL 53 06/14/2021    LDL 67 06/14/2021    TRIG 71 06/14/2021     LIVER ENZYME RESULTS:  Lab Results   Component Value Date    AST 27 04/04/2019    ALT 22 06/14/2021     CBC RESULTS:  Lab Results   Component Value Date    WBC 5.3 06/14/2021    RBC 4.33 06/14/2021    HGB 12.6 03/11/2022    HGB 12.8 06/14/2021    HCT 40.1 06/14/2021    MCV 93 06/14/2021    MCH 29.6 06/14/2021    MCHC 31.9 06/14/2021    RDW 13.5 06/14/2021     06/14/2021     BMP RESULTS:  Lab Results   Component Value Date      04/11/2022     06/23/2021    POTASSIUM 3.5 04/11/2022    POTASSIUM 4.5 06/23/2021    CHLORIDE 102 04/11/2022    CHLORIDE 104 06/23/2021    CO2 33 (H) 04/11/2022    CO2 31 06/23/2021    ANIONGAP 4 04/11/2022    ANIONGAP 3 06/23/2021    GLC 90 04/11/2022    GLC 86 06/23/2021    BUN 24 04/11/2022    BUN 30 06/23/2021    CR 1.18 (H) 04/11/2022    CR 1.29 (H) 06/23/2021    GFRESTIMATED 47 (L) 04/11/2022    GFRESTIMATED 39 (L) 06/23/2021    GFRESTBLACK 46 (L) 06/23/2021    BESS 9.3 04/11/2022    BESS 9.2 06/23/2021      A1C RESULTS:  Lab Results   Component Value Date    A1C 5.6 01/30/2019    A1C 5.3 01/04/2019     INR RESULTS:  Lab Results   Component Value Date    INR 2.4 (H) 06/03/2022    INR 2.8 (H) 05/06/2022    INR 2.43 (H) 03/11/2022    INR 2.30 (H) 07/09/2021    INR 2.40 (H) 06/10/2021          Medications     Current Outpatient Medications   Medication Sig Dispense Refill     Acetaminophen 325 MG CAPS Take 325-650 mg by mouth every 4 hours as needed       albuterol (PROAIR HFA/PROVENTIL HFA/VENTOLIN HFA) 108 (90 Base) MCG/ACT inhaler Inhale 2 puffs into the lungs every 6 hours as needed for shortness of breath / dyspnea or wheezing 18 g 0     aspirin (ASA) 81 MG EC tablet Take 81 mg by mouth every 24 hours       cholecalciferol 25 MCG (1000 UT) TABS Take 1,000 Units by mouth daily        cyanocobalamin (VITAMIN B-12) 100 MCG tablet Take 1,000 mcg by mouth daily        escitalopram (LEXAPRO) 10 MG tablet Take 1 tablet (10 mg) by mouth daily 90 tablet 3     metoprolol tartrate (LOPRESSOR) 25 MG tablet Take 1 tablet (25 mg) by mouth 2 times daily 180 tablet 3     mirtazapine (REMERON) 15 MG tablet Take 1 tablet (15 mg) by mouth At Bedtime 90 tablet 3     torsemide (DEMADEX) 10 MG tablet Take 3 tablets (30 mg) by mouth daily 180 tablet 3     warfarin ANTICOAGULANT (COUMADIN) 2 MG tablet Current dose: 4 mg (2 tablets) daily or as instructed by ACC team. 185 tablet 1          Past Medical History     Past  Medical History:   Diagnosis Date     Benign essential hypertension      Chronic atrial fibrillation (H)     s/p AV node ablation January, 2019     Chronic diastolic heart failure (H)      Chronic kidney disease, stage III (moderate) (H)      Past Surgical History:   Procedure Laterality Date     ANGIOGRAM Right 01/04/2019    Procedure: DIAGONSTIC ANGIOGRAM OF SMA;  Surgeon: Rigo Jennings MD;  Location:  OR     BIOPSY BREAST       BYPASS GRAFT ARTERY CORONARY, REPAIR VALVE AORTIC, COMBINED N/A 01/04/2019    Procedure: AORTIC DISSECTION REPAIR WITH GRAFT- HEMASHIELD PLATINUM WOVEN DOUBLE VELOR 4 SIDE ARM VASCULAR GRAFT, D:35 X 12 X 10 X 10MM/ L:50CM;  Surgeon: Jose Winslow MD;  Location:  OR     EP ABLATION AV NODE N/A 01/11/2019    Procedure: EP Ablation AV Node;  Surgeon: Tsering Davey MD;  Location:  HEART CARDIAC CATH LAB     EP PACEMAKER Left 01/11/2019    Procedure: EP Pacemaker;  Surgeon: Tsering Davey MD;  Location:  HEART CARDIAC CATH LAB     THYROID SURGERY      benign mass removed     TRACHEOSTOMY N/A 01/25/2019    Procedure: TRACHEOSTOMY  (DR MCCLELLAND);  Surgeon: Bryson Mcclelland MD;  Location:  OR     TUBAL LIGATION       Family History   Problem Relation Age of Onset     Diabetes No family hx of      Myocardial Infarction No family hx of      Cerebrovascular Disease No family hx of      Coronary Artery Disease Early Onset No family hx of      Breast Cancer No family hx of      Colon Cancer No family hx of      Ovarian Cancer No family hx of      Ataxia Mother      Heart Disease Father             Allergies   Amoxicillin and Ciprofloxacin    40 minutes spent on the date of the encounter doing chart review, history and exam, documentation and further activities as noted above      TAMRA Ryan [facility]  Pager: [phone]    Thank you for allowing me to participate in the care of your patient.      Sincerely,     Xochitl BAKER  TAMRA Eden CNP     [facility]  cc:   Becky Kwok MD  [address] [address]

## 2022-06-21 NOTE — CONSULTS
CARDIOTHORACIC SURGERY CONSULTATION  1/4/2019       Date of Admission:  1/4/2019  Reason for Consultation: We were asked to evaluate Priya Funez for a type A aortic dissection. The patient was seen and evaluated.  Attending: Dr. Jose Winslow    HPI: Priya Funez is a 78 year old female with significant history of hypertension who had the sudden onset of jaw and back pain earlier this afternoon. She also had associated weakness. EMS was contacted and she was transferred to the ED where a CT of the chest showed a Type A aortic dissection.     ROS:   The Review of Systems is negative other than noted in the HPI.    Past Medical History:  Past Medical History:   Diagnosis Date     Hypertension        Past Surgical History:   No past surgical history on file.    Medications:     No current facility-administered medications on file prior to encounter.   Current Outpatient Medications on File Prior to Encounter:  aspirin (ASA) 81 MG EC tablet Take 81 mg by mouth every 24 hours   cholecalciferol (VITAMIN D-1000 MAX ST) 1000 units TABS Take 1,000 Units by mouth   triamterene-HCTZ (MAXZIDE-25) 37.5-25 MG tablet Take 0.5 tablets by mouth   ACTONEL 35 MG OR TABS ONE WEEKLY       Allergies:   Allergies   Allergen Reactions     Amoxicillin Unknown     Ciprofloxacin Unknown       Social History:   Social History     Socioeconomic History     Marital status:      Spouse name: Not on file     Number of children: Not on file     Years of education: Not on file     Highest education level: Not on file   Social Needs     Financial resource strain: Not on file     Food insecurity - worry: Not on file     Food insecurity - inability: Not on file     Transportation needs - medical: Not on file     Transportation needs - non-medical: Not on file   Occupational History     Not on file   Tobacco Use     Smoking status: Never Smoker     Smokeless tobacco: Never Used     Tobacco comment: hisband   Substance and Sexual Activity  Patient needs new order that was from 6/9/21. Please advise. "    Alcohol use: No     Frequency: Never     Drug use: No     Sexual activity: Not on file   Other Topics Concern     Not on file   Social History Narrative     Not on file       Family History:   No family history on file.    Exam:  /59   Pulse 81   Temp 96.1  F (35.6  C) (Temporal)   Resp 12   Ht 1.626 m (5' 4\")   Wt 77.1 kg (170 lb)   SpO2 94%   BMI 29.18 kg/m    General: NAD, following commands  HEENT: pupils equal and reactive  CV: RRR, no added sounds  Pulm: Non labored breathing, CTAB  Abd: soft, non distended, moderately tender to palpation  Ext: Non tender, peripheral pulses palpable  Neuro: A&O x3, CN 2-12 intact, motor and sensation intact     Labs: Reviewed in full. See EMR for details.       Imaging: Reviewed.   Recent Results (from the past 24 hour(s))   Chest XR,  PA & LAT    Narrative    CHEST TWO VIEW   1/4/2019 2:23 PM     HISTORY: Shortness of breath, chest pain.    COMPARISON: None.      Impression    IMPRESSION: PA and lateral views of the chest. Patient is slightly  rotated to the right on the frontal view. Lungs are clear. Heart is  normal in size. No effusions are evident. No pneumothorax. Hiatal  hernia with air-fluid level projects through the cardiac silhouette.    KENZIE MARTE MD   CT Aortic Survey w Contrast   Result Value    Radiologist flags Aortic dissection (AA)    Narrative    CT AORTIC SURVEY WITH CONTRAST 1/4/2019 2:47 PM     HISTORY: See the Clinical Information for Interpreting Provider; jaw  pain, near syncope, abdominal pain, burping, evaluate for aortic  dissection.    TECHNIQUE: Initial noncontrast imaging is performed through the chest.  Additional thin section axial images from the thoracic inlet to the  symphysis are performed with additional coronal and sagittal  reformatted images. 80 mL of Isovue 370 are given intravenously.   Radiation dose for this scan was reduced using automated exposure  control, adjustment of the mA and/or kV according to patient " size, or  iterative reconstruction technique.    FINDINGS:   Chest: Calcified granuloma medial right upper lobe is present on  series 6, image 18. Subpleural nodular density left lower lobe  measures 0.4 cm on image 58. Lungs are otherwise clear. No pleural or  pericardial fluid. Heart is normal in size. No enlarged lymph nodes.  Thyroid gland is normal in size. Central pulmonary arteries are  patent.    Ascending thoracic aorta is aneurysmal on initial noncontrast images  measuring up to 5.6 x 5.4 cm on series 4, image 30. Aortic dissection  is present starting in the ascending thoracic aorta and extends  through the aortic arch and descending thoracic aorta terminating in  the region of the abdominal aortic bifurcation into the right and left  common iliac arteries. Dissection flap also extends over a short  segment into the right brachiocephalic artery and left subclavian  artery. Flow in the left subclavian artery near the thoracic inlet on  series 5, image 17 markedly narrows compared to the contralateral  right subclavian artery. This may be related to the dissection.  Correlate for any changes of ischemia in left upper extremity. A  larger and smaller lumen is present and contrast opacification in each  lumen appears fairly symmetric in the thoracic aorta. The celiac axis  origin and right renal artery origin appear to arise off the smaller  of the two lumens and there is marked decreased or absent enhancement  of the right kidney probably representing arterial occlusion. Celiac  axis appears well perfused. SMA appears to arise off the larger lumen  and after a short segment of patency is occluded. Loops of small bowel  in the abdomen demonstrate wall thickening probably related to  ischemia. Left renal artery arises off the larger lumen and the left  kidney appears perfused. The JARDA also appears to arise off the larger  lumen but is difficult to confirm but is patent. Both the right and  left iliac arteries  are patent and appear well-perfused.    Abdomen: The liver with small left hepatic lobe cyst or hemangioma is  noted on series 5, image 90 and is unremarkable. The spleen,  gallbladder, pancreas and adrenal glands are within normal limits.  Right kidney again is not perfused. Nonobstructing renal collecting  system stones are noted in the right kidney. No hydronephrosis. Left  kidney appears perfused and no renal calculi are evident. No  hydronephrosis. Small bowel again demonstrates mild wall thickening  likely resulting from SMA occlusion and resulting ischemia. No  significant mesenteric stranding is currently evident. No  diverticulitis, obstruction or appendicitis.    Pelvis: The bladder, uterus and rectum are unremarkable. No enlarged  pelvic lymph nodes or free fluid. Bone window examination demonstrates  degenerative spine changes. No aggressive appearing bone lesions are  noted.      Impression    IMPRESSION:  1. Extensive aortic dissection from the ascending thoracic aorta  through the proximal aortic bifurcation in the abdomen. 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 Correlate clinically for any  changes of ischemia involving the left upper extremity.  2. No other acute changes are evident in the chest, abdomen or pelvis.    [Critical Result: Aortic dissection]    Finding was identified on 1/4/2019 3:19 PM.     Dr. Alicia was contacted by me on 1/4/2019 3:31 PM and verbalized  understanding of the critical result.           Assessment: Priya Funez is a 78 year old female with significant history of CAD who presented with an acute Type A aortic dissection. The nature of the patient's problem was explained to the patient and her family, including rationale for surgery and the risks (including possible  death) and benefits. They have agreed to proceed with emergent surgery.    Recommendations:  - To the OR for emergent repair of Type A aortic dissection, possible AVR.    The patient was discussed with Jose Winslow MD, surgery staff, who agrees with the plan.    Bobby Wharton MD  Fellow, CT Surgery

## 2022-06-24 ENCOUNTER — ANCILLARY PROCEDURE (OUTPATIENT)
Dept: CARDIOLOGY | Facility: CLINIC | Age: 82
End: 2022-06-24
Attending: INTERNAL MEDICINE
Payer: COMMERCIAL

## 2022-06-24 DIAGNOSIS — Z95.0 CARDIAC PACEMAKER IN SITU: ICD-10-CM

## 2022-06-24 DIAGNOSIS — Z98.890 HX OF ATRIOVENTRICULAR NODE ABLATION: ICD-10-CM

## 2022-06-24 DIAGNOSIS — I49.5 SICK SINUS SYNDROME (H): ICD-10-CM

## 2022-06-24 DIAGNOSIS — Z95.0 CARDIAC PACEMAKER IN SITU: Primary | ICD-10-CM

## 2022-06-24 DIAGNOSIS — I44.2 COMPLETE HEART BLOCK (H): ICD-10-CM

## 2022-06-24 PROCEDURE — 93296 REM INTERROG EVL PM/IDS: CPT | Performed by: INTERNAL MEDICINE

## 2022-06-24 PROCEDURE — 93294 REM INTERROG EVL PM/LDLS PM: CPT | Performed by: INTERNAL MEDICINE

## 2022-06-30 ENCOUNTER — LAB (OUTPATIENT)
Dept: LAB | Facility: CLINIC | Age: 82
End: 2022-06-30

## 2022-06-30 ENCOUNTER — LAB (OUTPATIENT)
Dept: LAB | Facility: CLINIC | Age: 82
End: 2022-06-30
Payer: COMMERCIAL

## 2022-06-30 ENCOUNTER — TELEPHONE (OUTPATIENT)
Dept: CARDIOLOGY | Facility: CLINIC | Age: 82
End: 2022-06-30

## 2022-06-30 ENCOUNTER — OFFICE VISIT (OUTPATIENT)
Dept: CARDIOLOGY | Facility: CLINIC | Age: 82
End: 2022-06-30
Attending: NURSE PRACTITIONER

## 2022-06-30 ENCOUNTER — CARE COORDINATION (OUTPATIENT)
Dept: CARDIOLOGY | Facility: CLINIC | Age: 82
End: 2022-06-30

## 2022-06-30 ENCOUNTER — ANTICOAGULATION THERAPY VISIT (OUTPATIENT)
Dept: ANTICOAGULATION | Facility: CLINIC | Age: 82
End: 2022-06-30

## 2022-06-30 VITALS
HEIGHT: 64 IN | HEART RATE: 81 BPM | WEIGHT: 158.9 LBS | DIASTOLIC BLOOD PRESSURE: 70 MMHG | BODY MASS INDEX: 27.13 KG/M2 | OXYGEN SATURATION: 97 % | SYSTOLIC BLOOD PRESSURE: 113 MMHG

## 2022-06-30 DIAGNOSIS — I50.812 CHRONIC RIGHT-SIDED HEART FAILURE (H): ICD-10-CM

## 2022-06-30 DIAGNOSIS — Z79.01 CURRENT USE OF LONG TERM ANTICOAGULATION: ICD-10-CM

## 2022-06-30 DIAGNOSIS — I48.20 CHRONIC ATRIAL FIBRILLATION (H): Primary | ICD-10-CM

## 2022-06-30 DIAGNOSIS — I50.32 CHRONIC DIASTOLIC CONGESTIVE HEART FAILURE (H): ICD-10-CM

## 2022-06-30 DIAGNOSIS — I48.20 CHRONIC ATRIAL FIBRILLATION (H): ICD-10-CM

## 2022-06-30 LAB
ANION GAP SERPL CALCULATED.3IONS-SCNC: 6 MMOL/L (ref 3–14)
BUN SERPL-MCNC: 25 MG/DL (ref 7–30)
CALCIUM SERPL-MCNC: 9.6 MG/DL (ref 8.5–10.1)
CHLORIDE BLD-SCNC: 98 MMOL/L (ref 94–109)
CO2 SERPL-SCNC: 32 MMOL/L (ref 20–32)
CREAT SERPL-MCNC: 1.3 MG/DL (ref 0.52–1.04)
GFR SERPL CREATININE-BSD FRML MDRD: 42 ML/MIN/1.73M2
GLUCOSE BLD-MCNC: 89 MG/DL (ref 70–99)
INR BLD: 1.9 (ref 0.9–1.1)
POTASSIUM BLD-SCNC: 4.2 MMOL/L (ref 3.4–5.3)
SODIUM SERPL-SCNC: 136 MMOL/L (ref 133–144)

## 2022-06-30 PROCEDURE — 99215 OFFICE O/P EST HI 40 MIN: CPT | Performed by: NURSE PRACTITIONER

## 2022-06-30 PROCEDURE — 36416 COLLJ CAPILLARY BLOOD SPEC: CPT

## 2022-06-30 PROCEDURE — 85610 PROTHROMBIN TIME: CPT

## 2022-06-30 PROCEDURE — 80048 BASIC METABOLIC PNL TOTAL CA: CPT | Performed by: NURSE PRACTITIONER

## 2022-06-30 PROCEDURE — 36415 COLL VENOUS BLD VENIPUNCTURE: CPT | Performed by: NURSE PRACTITIONER

## 2022-06-30 NOTE — PATIENT INSTRUCTIONS
No medication changes  Follow up with CORE in 6 weeks  We will call you with lab results  Please call with any questions/concerns [phone]

## 2022-06-30 NOTE — PROGRESS NOTES
Cardiology Clinic Progress Note  Priya Funez MRN# [id number]   YOB: 1940 Age: 81 year old   Primary Cardiologist: Dr. Kwok Reason for visit: CORE follow up            Assessment and Plan:   Priya Funez is a very pleasant 81 year old female who I am seeing today for cardiology follow up.      1.  Chronic diastolic heart failure/HFpEF/right heart failure with severe tricuspid regurgitation:  LVEF 55-60%, RV moderately to severely dilated, RVIDD at base 5.7cm, mild decrease in RV systolic function and severe tricuspid regurgitation.               - NYHA class III, stage C              - Fluid status : mildly hypervolemic, volume status does appear improved today.               - Diuretic regimen : torsemide to 30mg daily              - Aldosterone antagonist : spironolactone 25mg daily              - Blood pressure : controlled  2. Chronic atrial fibrillation s/p AV node ablation 1/2019 - stable, asymptomatic. Most recent device interrogation was stable in 3/2022.               - Continue routine follow up with EP and device clinic              - [id number] score 3 (age, female, HF)              - Continue warfarin for thromboembolic prophylaxis  3. Emergent aortic dissection repair 1/4/19  4. Hypertension - controlled, continue current medications  5. Obesity - counseled patient on weight loss strategies.  6. Mild mitral regurgitation  7. Severe tricuspid regurgitation    I saw patient today for CORE follow up, given the need for medication optimization and right HF/TR recommend staying enrolled in CORE clinic. She was started on spironolactone during our last OV, weight is down ~ 2-3# and she has noted some improvement in her orthopnea/PND. BMP from today pending. At this time recommend continuing current medications, will review lab results and determine if further adjustments can be made. Her diet remains high in sodium which is contributing, we discussed open arms low sodium meals  which she is interested in. Will help her complete the application today.     Will price out SGLT2 inhibitors for consideration.     Will consider further testing with cardiac MRI in the future to further evaluate RV and TR, ideally after we optimize medications/volume status. She is willing and interested in proceeding with additional imaging if needed.     Changes today: none, will consider further adjustments after reviewing labs but she notes resistance to further changes at this time and would like to continue current medications. Again said we would discuss further after reviewing labs.     Follow up plan:     BMP pending - will call with results and determine medication adjustments    Start open arms meal application    CORE follow up in 6 weeks with BMP prior        History of Presenting Illness:    Priya Funez is a very pleasant 81 year old female with a history of HFpEF, right heart failure, atrial fibrillation s/p AV leo ablation with PPM implantation 1/11/2019, emergent aortic dissection repair 1/4/19, hypertension and obesity.      Patient presented in January with dissecting aortic aneurysm, she was hospitalized from 1/4/19-1/29/19. Patient had a complicated hospital course. There was prolonged ischemia to the SMA and right renal regions. She required tracheostomy placement and was discharged to [place] on ventilatory support. Patients tracheostomy has been discontinued and she transitioned home the beginning of June.      Post hospitalization she was evaluated by Dr. Morales in July 2019 at which point patient was noted to be volume up on exam. Recommended increasing her torsemide to 20mg BID. Lymphedema clinic referral placed and CORE consult ordered. I first met patient for CORE enrollment 7/9/19. She has been following closely in CORE clinic since.      She underwent surveillance CT on 8/7/2019 for aortic dissection repair which was stable, showing similar appearance as post repair.      She  "last saw Dr. Kwok 4/2022 at which point she was doing okay. Recommended echocardiogram. No medication changes.      Echocardiogram 6/2022 showed LVEF 55-60%, RV moderately to severely dilated, RVIDD at base 5.7cm, mild decrease in RV systolic function, severe TR with malcoaptation of the TV leaflet, IVC dilated. When compared to prior echo from 6/2021, increased RV dilation and worsening TR.     During our last OV in a few weeks ago we reviewed echo results and discussed options to further optimize her medications. She started spironolactone 25mg daily. She has been resistant to consideration for further titration of her torsemide.     Patient is here today for CORE follow up.     Patient reports feeling good. Monitoring weights daily a home. Weight down to 2-3# since initiation of spironolactone. Has been taking torsemide 30mg. Denies LE edema. Occasional abdominal bloating. Denies shortness of breath at rest. Complaints of exertional dyspnea with activity. Continues to have orthopnea or PND, but states has been improved the past 2 weeks. Denies chest pain or chest tightness. Denies dizziness, lightheadedness or other presyncopal symptoms. Denies tachycardia or palpitations. Denies episodes of bleeding. Taking medications daily.     Labs today pending. Blood pressure 113/70 and HR 81 in clinic today.    Appetite good. Really trying to monitor her sodium intake, but still eating multiple meals out a week. \"I don't like cooking\". Going to the mall 3-4 days a week to walk, typically walking 15 mins. Also getting activity with yard work. Denies tobacco use. Occasionally will share a little beer with her .         Social History    , 4 children, 8 grandchildren, enjoys her gardens in the summer.   Social History     Socioeconomic History     Marital status:      Spouse name: Not on file     Number of children: Not on file     Years of education: Not on file     Highest education level: Not on " "file   Occupational History     Occupation: Retired - customer service   Tobacco Use     Smoking status: Never Smoker     Smokeless tobacco: Never Used   Substance and Sexual Activity     Alcohol use: Not Currently     Drug use: No     Sexual activity: Not on file   Other Topics Concern     Parent/sibling w/ CABG, MI or angioplasty before 65F 55M? Not Asked   Social History Narrative    .    Lives in home with . Fully independent.    4 adult children.    8 grandchildren.     Social Determinants of Health     Financial Resource Strain: Not on file   Food Insecurity: Not on file   Transportation Needs: Not on file   Physical Activity: Not on file   Stress: Not on file   Social Connections: Not on file   Intimate Partner Violence: Not on file   Housing Stability: Not on file            Review of Systems:   Skin:  Negative     Eyes:  Positive for glasses  ENT:       Respiratory:  Positive for dyspnea on exertion;shortness of breath;cough  Cardiovascular:  Negative;palpitations;edema;chest pain;dizziness;syncope or near-syncope;cyanosis;lightheadedness;fatigue Positive for  Gastroenterology:      Genitourinary:  Positive for urinary frequency  Musculoskeletal:       Neurologic:       Psychiatric:       Heme/Lymph/Imm:       Endocrine:  Negative           Physical Exam:   Vitals: /70   Pulse 81   Ht 1.613 m (5' 3.5\")   Wt 72.1 kg (158 lb 14.4 oz)   SpO2 97%   BMI 27.71 kg/m     Wt Readings from Last 4 Encounters:   06/30/22 72.1 kg (158 lb 14.4 oz)   06/13/22 72.8 kg (160 lb 9.6 oz)   04/11/22 73 kg (161 lb)   04/04/22 73 kg (161 lb)     GEN: well nourished, in no acute distress.  HEENT:  Pupils equal, round. Sclerae nonicteric.   NECK: Supple, no masses appreciated. JVP elevated   C/V:  Regular rate and rhythm, 3/6 holosytolic murmur  RESP: Respirations are unlabored. Clear to auscultation bilaterally without wheezing, rales, or rhonchi.  GI: Abdomen soft, obese, nontender.  EXTREM: No LE " edema.  NEURO: Alert and oriented, cooperative.  SKIN: Warm and dry       Data:     LIPID RESULTS:  Lab Results   Component Value Date    CHOL 134 06/14/2021    HDL 53 06/14/2021    LDL 67 06/14/2021    TRIG 71 06/14/2021     LIVER ENZYME RESULTS:  Lab Results   Component Value Date    AST 27 04/04/2019    ALT 22 06/14/2021     CBC RESULTS:  Lab Results   Component Value Date    WBC 5.3 06/14/2021    RBC 4.33 06/14/2021    HGB 12.6 03/11/2022    HGB 12.8 06/14/2021    HCT 40.1 06/14/2021    MCV 93 06/14/2021    MCH 29.6 06/14/2021    MCHC 31.9 06/14/2021    RDW 13.5 06/14/2021     06/14/2021     BMP RESULTS:  Lab Results   Component Value Date     06/13/2022     06/23/2021    POTASSIUM 3.9 06/13/2022    POTASSIUM 4.5 06/23/2021    CHLORIDE 102 06/13/2022    CHLORIDE 104 06/23/2021    CO2 32 06/13/2022    CO2 31 06/23/2021    ANIONGAP 5 06/13/2022    ANIONGAP 3 06/23/2021    GLC 93 06/13/2022    GLC 86 06/23/2021    BUN 23 06/13/2022    BUN 30 06/23/2021    CR 1.13 (H) 06/13/2022    CR 1.29 (H) 06/23/2021    GFRESTIMATED 49 (L) 06/13/2022    GFRESTIMATED 39 (L) 06/23/2021    GFRESTBLACK 46 (L) 06/23/2021    BESS 9.1 06/13/2022    BESS 9.2 06/23/2021      A1C RESULTS:  Lab Results   Component Value Date    A1C 5.6 01/30/2019    A1C 5.3 01/04/2019     INR RESULTS:  Lab Results   Component Value Date    INR 2.4 (H) 06/03/2022    INR 2.8 (H) 05/06/2022    INR 2.43 (H) 03/11/2022    INR 2.30 (H) 07/09/2021    INR 2.40 (H) 06/10/2021            Medications     Current Outpatient Medications   Medication Sig Dispense Refill     Acetaminophen 325 MG CAPS Take 325-650 mg by mouth every 4 hours as needed       albuterol (PROAIR HFA/PROVENTIL HFA/VENTOLIN HFA) 108 (90 Base) MCG/ACT inhaler Inhale 2 puffs into the lungs every 6 hours as needed for shortness of breath / dyspnea or wheezing 18 g 0     aspirin (ASA) 81 MG EC tablet Take 81 mg by mouth every 24 hours       cholecalciferol 25 MCG (1000 UT) TABS Take  1,000 Units by mouth daily        cyanocobalamin (VITAMIN B-12) 100 MCG tablet Take 1,000 mcg by mouth daily        escitalopram (LEXAPRO) 10 MG tablet Take 1 tablet (10 mg) by mouth daily 90 tablet 3     metoprolol tartrate (LOPRESSOR) 25 MG tablet Take 1 tablet (25 mg) by mouth 2 times daily 180 tablet 3     mirtazapine (REMERON) 15 MG tablet Take 1 tablet (15 mg) by mouth At Bedtime 90 tablet 3     spironolactone (ALDACTONE) 25 MG tablet Take 1 tablet (25 mg) by mouth daily 30 tablet 1     torsemide (DEMADEX) 10 MG tablet Take 3 tablets (30 mg) by mouth daily 180 tablet 3     warfarin ANTICOAGULANT (COUMADIN) 2 MG tablet Current dose: 4 mg (2 tablets) daily or as instructed by ACC team. 185 tablet 1          Past Medical History     Past Medical History:   Diagnosis Date     Benign essential hypertension      Chronic atrial fibrillation (H)     s/p AV node ablation January, 2019     Chronic diastolic heart failure (H)      Chronic kidney disease, stage III (moderate) (H)      Past Surgical History:   Procedure Laterality Date     ANGIOGRAM Right 01/04/2019    Procedure: DIAGONSTIC ANGIOGRAM OF SMA;  Surgeon: Rigo Jennings MD;  Location:  OR     BIOPSY BREAST       BYPASS GRAFT ARTERY CORONARY, REPAIR VALVE AORTIC, COMBINED N/A 01/04/2019    Procedure: AORTIC DISSECTION REPAIR WITH GRAFT- HEMASHIELD PLATINUM WOVEN DOUBLE VELOR 4 SIDE ARM VASCULAR GRAFT, D:35 X 12 X 10 X 10MM/ L:50CM;  Surgeon: Jose Winslow MD;  Location:  OR     EP ABLATION AV NODE N/A 01/11/2019    Procedure: EP Ablation AV Node;  Surgeon: Tsering Davey MD;  Location:  HEART CARDIAC CATH LAB     EP PACEMAKER Left 01/11/2019    Procedure: EP Pacemaker;  Surgeon: Tsering Davey MD;  Location:  HEART CARDIAC CATH LAB     THYROID SURGERY      benign mass removed     TRACHEOSTOMY N/A 01/25/2019    Procedure: TRACHEOSTOMY  (DR MCCLELLAND);  Surgeon: Bryson Mcclelland MD;  Location:  OR     TUBAL LIGATION       Family  History   Problem Relation Age of Onset     Diabetes No family hx of      Myocardial Infarction No family hx of      Cerebrovascular Disease No family hx of      Coronary Artery Disease Early Onset No family hx of      Breast Cancer No family hx of      Colon Cancer No family hx of      Ovarian Cancer No family hx of      Ataxia Mother      Heart Disease Father             Allergies   Amoxicillin and Ciprofloxacin    40 minutes spent on the date of the encounter doing chart review, history and exam, documentation and further activities as noted above      TAMRA Ryan CNP  [facility]  Pager: [phone]

## 2022-06-30 NOTE — LETTER
6/30/2022    Sharmaine Burks MD  [address] [facility] [address]    RE: Priya Funez       Dear Colleague,     I had the pleasure of seeing Priya Funez in the [facility].  Cardiology Clinic Progress Note  Priya Funez MRN# [id number]   YOB: 1940 Age: 81 year old   Primary Cardiologist: Dr. Kwok Reason for visit: CORE follow up            Assessment and Plan:   Priya Funez is a very pleasant 81 year old female who I am seeing today for cardiology follow up.      1.  Chronic diastolic heart failure/HFpEF/right heart failure with severe tricuspid regurgitation:  LVEF 55-60%, RV moderately to severely dilated, RVIDD at base 5.7cm, mild decrease in RV systolic function and severe tricuspid regurgitation.               - NYHA class III, stage C              - Fluid status : mildly hypervolemic, volume status does appear improved today.               - Diuretic regimen : torsemide to 30mg daily              - Aldosterone antagonist : spironolactone 25mg daily              - Blood pressure : controlled  2. Chronic atrial fibrillation s/p AV node ablation 1/2019 - stable, asymptomatic. Most recent device interrogation was stable in 3/2022.               - Continue routine follow up with EP and device clinic              - [id number] score 3 (age, female, HF)              - Continue warfarin for thromboembolic prophylaxis  3. Emergent aortic dissection repair 1/4/19  4. Hypertension - controlled, continue current medications  5. Obesity - counseled patient on weight loss strategies.  6. Mild mitral regurgitation  7. Severe tricuspid regurgitation    I saw patient today for CORE follow up, given the need for medication optimization and right HF/TR recommend staying enrolled in CORE clinic. She was started on spironolactone during our last OV, weight is down ~ 2-3# and she has noted some improvement in her orthopnea/PND. BMP from today pending. At this time  recommend continuing current medications, will review lab results and determine if further adjustments can be made. Her diet remains high in sodium which is contributing, we discussed [employer] low sodium meals which she is interested in. Will help her complete the application today.     Will price out SGLT2 inhibitors for consideration.     Will consider further testing with cardiac MRI in the future to further evaluate RV and TR, ideally after we optimize medications/volume status. She is willing and interested in proceeding with additional imaging if needed.     Changes today: none, will consider further adjustments after reviewing labs but she notes resistance to further changes at this time and would like to continue current medications. Again said we would discuss further after reviewing labs.     Follow up plan:     BMP pending - will call with results and determine medication adjustments    Start [employer] meal application    CORE follow up in 6 weeks with BMP prior        History of Presenting Illness:    Priya Funez is a very pleasant 81 year old female with a history of HFpEF, right heart failure, atrial fibrillation s/p AV leo ablation with PPM implantation 1/11/2019, emergent aortic dissection repair 1/4/19, hypertension and obesity.      Patient presented in January with dissecting aortic aneurysm, she was hospitalized from 1/4/19-1/29/19. Patient had a complicated hospital course. There was prolonged ischemia to the SMA and right renal regions. She required tracheostomy placement and was discharged to [facility] on ventilatory support. Patients tracheostomy has been discontinued and she transitioned home the beginning of June.      Post hospitalization she was evaluated by Dr. Morales in July 2019 at which point patient was noted to be volume up on exam. Recommended increasing her torsemide to 20mg BID. Lymphedema clinic referral placed and CORE consult ordered. I first met patient for CORE  "enrollment 7/9/19. She has been following closely in CORE clinic since.      She underwent surveillance CT on 8/7/2019 for aortic dissection repair which was stable, showing similar appearance as post repair.      She last saw Dr. Kwok 4/2022 at which point she was doing okay. Recommended echocardiogram. No medication changes.      Echocardiogram 6/2022 showed LVEF 55-60%, RV moderately to severely dilated, RVIDD at base 5.7cm, mild decrease in RV systolic function, severe TR with malcoaptation of the TV leaflet, IVC dilated. When compared to prior echo from 6/2021, increased RV dilation and worsening TR.     During our last OV in a few weeks ago we reviewed echo results and discussed options to further optimize her medications. She started spironolactone 25mg daily. She has been resistant to consideration for further titration of her torsemide.     Patient is here today for CORE follow up.     Patient reports feeling good. Monitoring weights daily a home. Weight down to 2-3# since initiation of spironolactone. Has been taking torsemide 30mg. Denies LE edema. Occasional abdominal bloating. Denies shortness of breath at rest. Complaints of exertional dyspnea with activity. Continues to have orthopnea or PND, but states has been improved the past 2 weeks. Denies chest pain or chest tightness. Denies dizziness, lightheadedness or other presyncopal symptoms. Denies tachycardia or palpitations. Denies episodes of bleeding. Taking medications daily.     Labs today pending. Blood pressure 113/70 and HR 81 in clinic today.    Appetite good. Really trying to monitor her sodium intake, but still eating multiple meals out a week. \"I don't like cooking\". Going to the mall 3-4 days a week to walk, typically walking 15 mins. Also getting activity with yard work. Denies tobacco use. Occasionally will share a little beer with her .         Social History    , 4 children, 8 grandchildren, enjoys her gardens in the " "summer.   Social History     Socioeconomic History     Marital status:      Spouse name: Not on file     Number of children: Not on file     Years of education: Not on file     Highest education level: Not on file   Occupational History     Occupation: Retired - customer service   Tobacco Use     Smoking status: Never Smoker     Smokeless tobacco: Never Used   Substance and Sexual Activity     Alcohol use: Not Currently     Drug use: No     Sexual activity: Not on file   Other Topics Concern     Parent/sibling w/ CABG, MI or angioplasty before 65F 55M? Not Asked   Social History Narrative    .    Lives in home with . Fully independent.    4 adult children.    8 grandchildren.     Social Determinants of Health     Financial Resource Strain: Not on file   Food Insecurity: Not on file   Transportation Needs: Not on file   Physical Activity: Not on file   Stress: Not on file   Social Connections: Not on file   Intimate Partner Violence: Not on file   Housing Stability: Not on file            Review of Systems:   Skin:  Negative     Eyes:  Positive for glasses  ENT:       Respiratory:  Positive for dyspnea on exertion;shortness of breath;cough  Cardiovascular:  Negative;palpitations;edema;chest pain;dizziness;syncope or near-syncope;cyanosis;lightheadedness;fatigue Positive for  Gastroenterology:      Genitourinary:  Positive for urinary frequency  Musculoskeletal:       Neurologic:       Psychiatric:       Heme/Lymph/Imm:       Endocrine:  Negative           Physical Exam:   Vitals: /70   Pulse 81   Ht 1.613 m (5' 3.5\")   Wt 72.1 kg (158 lb 14.4 oz)   SpO2 97%   BMI 27.71 kg/m     Wt Readings from Last 4 Encounters:   06/30/22 72.1 kg (158 lb 14.4 oz)   06/13/22 72.8 kg (160 lb 9.6 oz)   04/11/22 73 kg (161 lb)   04/04/22 73 kg (161 lb)     GEN: well nourished, in no acute distress.  HEENT:  Pupils equal, round. Sclerae nonicteric.   NECK: Supple, no masses appreciated. JVP elevated "   C/V:  Regular rate and rhythm, 3/6 holosytolic murmur  RESP: Respirations are unlabored. Clear to auscultation bilaterally without wheezing, rales, or rhonchi.  GI: Abdomen soft, obese, nontender.  EXTREM: No LE edema.  NEURO: Alert and oriented, cooperative.  SKIN: Warm and dry       Data:     LIPID RESULTS:  Lab Results   Component Value Date    CHOL 134 06/14/2021    HDL 53 06/14/2021    LDL 67 06/14/2021    TRIG 71 06/14/2021     LIVER ENZYME RESULTS:  Lab Results   Component Value Date    AST 27 04/04/2019    ALT 22 06/14/2021     CBC RESULTS:  Lab Results   Component Value Date    WBC 5.3 06/14/2021    RBC 4.33 06/14/2021    HGB 12.6 03/11/2022    HGB 12.8 06/14/2021    HCT 40.1 06/14/2021    MCV 93 06/14/2021    MCH 29.6 06/14/2021    MCHC 31.9 06/14/2021    RDW 13.5 06/14/2021     06/14/2021     BMP RESULTS:  Lab Results   Component Value Date     06/13/2022     06/23/2021    POTASSIUM 3.9 06/13/2022    POTASSIUM 4.5 06/23/2021    CHLORIDE 102 06/13/2022    CHLORIDE 104 06/23/2021    CO2 32 06/13/2022    CO2 31 06/23/2021    ANIONGAP 5 06/13/2022    ANIONGAP 3 06/23/2021    GLC 93 06/13/2022    GLC 86 06/23/2021    BUN 23 06/13/2022    BUN 30 06/23/2021    CR 1.13 (H) 06/13/2022    CR 1.29 (H) 06/23/2021    GFRESTIMATED 49 (L) 06/13/2022    GFRESTIMATED 39 (L) 06/23/2021    GFRESTBLACK 46 (L) 06/23/2021    BESS 9.1 06/13/2022    BESS 9.2 06/23/2021      A1C RESULTS:  Lab Results   Component Value Date    A1C 5.6 01/30/2019    A1C 5.3 01/04/2019     INR RESULTS:  Lab Results   Component Value Date    INR 2.4 (H) 06/03/2022    INR 2.8 (H) 05/06/2022    INR 2.43 (H) 03/11/2022    INR 2.30 (H) 07/09/2021    INR 2.40 (H) 06/10/2021            Medications     Current Outpatient Medications   Medication Sig Dispense Refill     Acetaminophen 325 MG CAPS Take 325-650 mg by mouth every 4 hours as needed       albuterol (PROAIR HFA/PROVENTIL HFA/VENTOLIN HFA) 108 (90 Base) MCG/ACT inhaler Inhale 2  puffs into the lungs every 6 hours as needed for shortness of breath / dyspnea or wheezing 18 g 0     aspirin (ASA) 81 MG EC tablet Take 81 mg by mouth every 24 hours       cholecalciferol 25 MCG (1000 UT) TABS Take 1,000 Units by mouth daily        cyanocobalamin (VITAMIN B-12) 100 MCG tablet Take 1,000 mcg by mouth daily        escitalopram (LEXAPRO) 10 MG tablet Take 1 tablet (10 mg) by mouth daily 90 tablet 3     metoprolol tartrate (LOPRESSOR) 25 MG tablet Take 1 tablet (25 mg) by mouth 2 times daily 180 tablet 3     mirtazapine (REMERON) 15 MG tablet Take 1 tablet (15 mg) by mouth At Bedtime 90 tablet 3     spironolactone (ALDACTONE) 25 MG tablet Take 1 tablet (25 mg) by mouth daily 30 tablet 1     torsemide (DEMADEX) 10 MG tablet Take 3 tablets (30 mg) by mouth daily 180 tablet 3     warfarin ANTICOAGULANT (COUMADIN) 2 MG tablet Current dose: 4 mg (2 tablets) daily or as instructed by ACC team. 185 tablet 1          Past Medical History     Past Medical History:   Diagnosis Date     Benign essential hypertension      Chronic atrial fibrillation (H)     s/p AV node ablation January, 2019     Chronic diastolic heart failure (H)      Chronic kidney disease, stage III (moderate) (H)      Past Surgical History:   Procedure Laterality Date     ANGIOGRAM Right 01/04/2019    Procedure: DIAGONSTIC ANGIOGRAM OF SMA;  Surgeon: Rigo Jennings MD;  Location:  OR     BIOPSY BREAST       BYPASS GRAFT ARTERY CORONARY, REPAIR VALVE AORTIC, COMBINED N/A 01/04/2019    Procedure: AORTIC DISSECTION REPAIR WITH GRAFT- HEMASHIELD PLATINUM WOVEN DOUBLE VELOR 4 SIDE ARM VASCULAR GRAFT, D:35 X 12 X 10 X 10MM/ L:50CM;  Surgeon: Jose Winslow MD;  Location: [facility] OR     EP ABLATION AV NODE N/A 01/11/2019    Procedure: EP Ablation AV Node;  Surgeon: Tsering Davey MD;  Location:  HEART CARDIAC CATH LAB     EP PACEMAKER Left 01/11/2019    Procedure: EP Pacemaker;  Surgeon: Tsering Davey MD;  Location:  HEART CARDIAC CATH  LAB     THYROID SURGERY      benign mass removed     TRACHEOSTOMY N/A 01/25/2019    Procedure: TRACHEOSTOMY  (DR MCCLELLAND);  Surgeon: Bryson Mcclelland MD;  Location: [facility] OR     TUBAL LIGATION       Family History   Problem Relation Age of Onset     Diabetes No family hx of      Myocardial Infarction No family hx of      Cerebrovascular Disease No family hx of      Coronary Artery Disease Early Onset No family hx of      Breast Cancer No family hx of      Colon Cancer No family hx of      Ovarian Cancer No family hx of      Ataxia Mother      Heart Disease Father             Allergies   Amoxicillin and Ciprofloxacin    40 minutes spent on the date of the encounter doing chart review, history and exam, documentation and further activities as noted above      TAMRA Ryan CNP  [facility]  Pager: [phone]      Thank you for allowing me to participate in the care of your patient.      Sincerely,     TAMRA Ryan CNP     [facility]  cc:   TAMRA Andres CNP  [address]  [place]  [place] [address]

## 2022-06-30 NOTE — TELEPHONE ENCOUNTER
SGLT2 inhibitor pricing below, unfortunately in coverage gap at this time and cost would be high.     TAMRA Ryan CNP  [facility]  Pager: [phone]      ----- Message from Tavia Wood sent at 6/30/2022 11:11 AM CDT -----  Regarding: RE: SGLT2 inhibitors  Hello,     I did test claims for the following SGLT2 drugs below.  They all look to be Tier 3 drugs with the same copay.  Farxiga is non-formulary and would need a PA.  Per copay amounts, patient looks to be in the coverage gap.  Until she is out of the donut hole she will pay a percentage of the drug until your total out of pocket costs reach $7,050 in 2022.     If there is a specific SGLT2 drug that is not listed below that you would like me to run a test claim for let me know, I just did the common drugs in that class.     Jardiance 25mg one tablet daily, 90 days supply-  $305.00 copay     Invokana 100mg one tablet daily, 90 days supply- $305.00 copay    Farxiga 5mg one tablet daily, 90 days supply- non-formulary, needs a PA     Invokamet 50-500mg one tablet daily, 90 days supply- $305.00 copay     Thanks!  Tavia     ----- Message -----  From: Odette Singh  Sent: 6/30/2022   8:53 AM CDT  To: Xochitl Hankins APRN CNP, #  Subject: RE: SGLT2 inhibitors                             Saige Patel is off this week.     Tavia can you look into this? Thank you!    Odette Singh  [facility] Liaison  Ph: [phone] Pager: [phone]       ----- Message -----  From: Xochitl Eden APRN CNP  Sent: 6/30/2022   8:46 AM CDT  To: Saige Marrero  Subject: SGLT2 inhibitors                                 RAVI Moulton,    Can you price out SGLT2 inhibitors for this patient?    Thank you,  Xochitl

## 2022-06-30 NOTE — TELEPHONE ENCOUNTER
Test claim for medications listed below:    Jardiance 25mg one tablet daily, 90 days supply-  $305.00 copay       Invokana 100mg one tablet daily, 90 days supply- $305.00 copay       Farxiga 5mg one tablet daily, 90 days supply- non-formulary, needs a PA       Invokamet 50-500mg one tablet daily, 90 days supply- $305.00 copay

## 2022-06-30 NOTE — PROGRESS NOTES
ANTICOAGULATION MANAGEMENT     Priya Funez 81 year old female is on warfarin with subtherapeutic INR result. (Goal INR 2.0-3.0)    Recent labs: (last 7 days)     06/30/22  1506   INR 1.9*       ASSESSMENT       Source(s): Chart Review and Patient/Caregiver Call       Warfarin doses taken: Warfarin taken as instructed    Diet: No new diet changes identified    New illness, injury, or hospitalization: No    Medication/supplement changes:  Spironolactone started 6/13/22: Potassium-Sparing Diuretics may diminish the anticoagulant effect of Vitamin K Antagonists.    Signs or symptoms of bleeding or clotting: No    Previous INR: Therapeutic last 2(+) visits    Additional findings: None       PLAN     Recommended plan for no diet, medication or health factor changes affecting INR     Dosing Instructions: continue your current warfarin dose with next INR in 2 weeks       Summary  As of 6/30/2022    Full warfarin instructions:  5 mg every Thu; 4 mg all other days   Next INR check:  7/14/2022             Telephone call with Priya who agrees to plan and repeated back plan correctly    Lab visit scheduled    Education provided: Please call back if any changes to your diet, medications or how you've been taking warfarin, Importance of consistent vitamin K intake, Potential interaction between warfarin and spironolactone and Contact [phone]  with any changes, questions or concerns.     Plan made per [facility] anticoagulation protocol    Amara Villarreal RN  Anticoagulation Clinic  6/30/2022    _______________________________________________________________________     Anticoagulation Episode Summary     Current INR goal:  2.0-3.0   TTR:  84.7 % (1 y)   Target end date:  Indefinite   Send INR reminders to:  [facility]    Indications    Chronic atrial fibrillation (H) [I48.20]  Current use of long term anticoagulation [Z79.01]           Comments:           Anticoagulation Care Providers     Provider Role  Specialty Phone number    Sharmaine Burks MD Referring Internal Medicine [phone]

## 2022-06-30 NOTE — PROGRESS NOTES
[facility] - CORE Clinic    Patient seen today by Xochitl Eden. Met w/patient to review/complete [employer] [place] application for meal delivery. Completed malika faxed to [facility] @ [phone].  Susie Elena RN on 6/30/2022 at 9:49 AM

## 2022-07-13 LAB
MDC_IDC_LEAD_IMPLANT_DT: NORMAL
MDC_IDC_LEAD_LOCATION: NORMAL
MDC_IDC_LEAD_LOCATION_DETAIL_1: NORMAL
MDC_IDC_LEAD_MFG: NORMAL
MDC_IDC_LEAD_MODEL: NORMAL
MDC_IDC_LEAD_POLARITY_TYPE: NORMAL
MDC_IDC_LEAD_SERIAL: NORMAL
MDC_IDC_MSMT_BATTERY_DTM: NORMAL
MDC_IDC_MSMT_BATTERY_REMAINING_LONGEVITY: 119 MO
MDC_IDC_MSMT_BATTERY_RRT_TRIGGER: 2.62
MDC_IDC_MSMT_BATTERY_STATUS: NORMAL
MDC_IDC_MSMT_BATTERY_VOLTAGE: 2.94 V
MDC_IDC_MSMT_LEADCHNL_RV_IMPEDANCE_VALUE: 399 OHM
MDC_IDC_MSMT_LEADCHNL_RV_IMPEDANCE_VALUE: 437 OHM
MDC_IDC_MSMT_LEADCHNL_RV_PACING_THRESHOLD_AMPLITUDE: 0.5 V
MDC_IDC_MSMT_LEADCHNL_RV_PACING_THRESHOLD_PULSEWIDTH: 0.4 MS
MDC_IDC_MSMT_LEADCHNL_RV_SENSING_INTR_AMPL: 3.12 MV
MDC_IDC_MSMT_LEADCHNL_RV_SENSING_INTR_AMPL: 3.12 MV
MDC_IDC_PG_IMPLANT_DTM: NORMAL
MDC_IDC_PG_MFG: NORMAL
MDC_IDC_PG_MODEL: NORMAL
MDC_IDC_PG_SERIAL: NORMAL
MDC_IDC_PG_TYPE: NORMAL
MDC_IDC_SESS_CLINIC_NAME: NORMAL
MDC_IDC_SESS_DTM: NORMAL
MDC_IDC_SESS_TYPE: NORMAL
MDC_IDC_SET_BRADY_HYSTRATE: NORMAL
MDC_IDC_SET_BRADY_LOWRATE: 60 {BEATS}/MIN
MDC_IDC_SET_BRADY_MAX_SENSOR_RATE: 120 {BEATS}/MIN
MDC_IDC_SET_BRADY_MODE: NORMAL
MDC_IDC_SET_LEADCHNL_RV_PACING_AMPLITUDE: 2 V
MDC_IDC_SET_LEADCHNL_RV_PACING_ANODE_ELECTRODE_1: NORMAL
MDC_IDC_SET_LEADCHNL_RV_PACING_ANODE_LOCATION_1: NORMAL
MDC_IDC_SET_LEADCHNL_RV_PACING_CAPTURE_MODE: NORMAL
MDC_IDC_SET_LEADCHNL_RV_PACING_CATHODE_ELECTRODE_1: NORMAL
MDC_IDC_SET_LEADCHNL_RV_PACING_CATHODE_LOCATION_1: NORMAL
MDC_IDC_SET_LEADCHNL_RV_PACING_POLARITY: NORMAL
MDC_IDC_SET_LEADCHNL_RV_PACING_PULSEWIDTH: 0.4 MS
MDC_IDC_SET_LEADCHNL_RV_SENSING_ANODE_ELECTRODE_1: NORMAL
MDC_IDC_SET_LEADCHNL_RV_SENSING_ANODE_LOCATION_1: NORMAL
MDC_IDC_SET_LEADCHNL_RV_SENSING_CATHODE_ELECTRODE_1: NORMAL
MDC_IDC_SET_LEADCHNL_RV_SENSING_CATHODE_LOCATION_1: NORMAL
MDC_IDC_SET_LEADCHNL_RV_SENSING_POLARITY: NORMAL
MDC_IDC_SET_LEADCHNL_RV_SENSING_SENSITIVITY: 0.9 MV
MDC_IDC_SET_ZONE_DETECTION_INTERVAL: 360 MS
MDC_IDC_SET_ZONE_TYPE: NORMAL
MDC_IDC_STAT_BRADY_DTM_END: NORMAL
MDC_IDC_STAT_BRADY_DTM_START: NORMAL
MDC_IDC_STAT_BRADY_RV_PERCENT_PACED: 99.99 %
MDC_IDC_STAT_EPISODE_RECENT_COUNT: 0
MDC_IDC_STAT_EPISODE_RECENT_COUNT_DTM_END: NORMAL
MDC_IDC_STAT_EPISODE_RECENT_COUNT_DTM_START: NORMAL
MDC_IDC_STAT_EPISODE_TOTAL_COUNT: 0
MDC_IDC_STAT_EPISODE_TOTAL_COUNT_DTM_END: NORMAL
MDC_IDC_STAT_EPISODE_TOTAL_COUNT_DTM_START: NORMAL
MDC_IDC_STAT_EPISODE_TYPE: NORMAL

## 2022-07-14 ENCOUNTER — LAB (OUTPATIENT)
Dept: LAB | Facility: CLINIC | Age: 82
End: 2022-07-14
Payer: COMMERCIAL

## 2022-07-14 ENCOUNTER — DOCUMENTATION ONLY (OUTPATIENT)
Dept: ANTICOAGULATION | Facility: CLINIC | Age: 82
End: 2022-07-14

## 2022-07-14 ENCOUNTER — ANTICOAGULATION THERAPY VISIT (OUTPATIENT)
Dept: ANTICOAGULATION | Facility: CLINIC | Age: 82
End: 2022-07-14

## 2022-07-14 DIAGNOSIS — I48.20 CHRONIC ATRIAL FIBRILLATION (H): ICD-10-CM

## 2022-07-14 DIAGNOSIS — I48.20 CHRONIC ATRIAL FIBRILLATION (H): Primary | ICD-10-CM

## 2022-07-14 DIAGNOSIS — Z79.01 CURRENT USE OF LONG TERM ANTICOAGULATION: ICD-10-CM

## 2022-07-14 LAB — INR BLD: 2.7 (ref 0.9–1.1)

## 2022-07-14 PROCEDURE — 85610 PROTHROMBIN TIME: CPT

## 2022-07-14 PROCEDURE — 36416 COLLJ CAPILLARY BLOOD SPEC: CPT

## 2022-07-14 NOTE — PROGRESS NOTES
ANTICOAGULATION MANAGEMENT     Priya Funez 81 year old female is on warfarin with therapeutic INR result. (Goal INR 2.0-3.0)    Recent labs: (last 7 days)     07/14/22  1525   INR 2.7*       ASSESSMENT       Source(s): Chart Review    Previous INR was Subtherapeutic    Medication, diet, health changes since last INR chart reviewed; none identified           PLAN     Unable to reach Priya today.    Left message to continue current dose of warfarin 5 mg tonight. Request call back for assessment.    Follow up required to confirm warfarin dose taken and assess for changes    Amara Villarreal RN  Anticoagulation Clinic  7/14/2022

## 2022-07-15 NOTE — PROGRESS NOTES
ANTICOAGULATION MANAGEMENT     Priya MARISOL Funez 81 year old female is on warfarin with therapeutic INR result. (Goal INR 2.0-3.0)    Recent labs: (last 7 days)     07/14/22  1525   INR 2.7*       ASSESSMENT       Source(s): Chart Review and Patient/Caregiver Call       Warfarin doses taken: Warfarin taken as instructed    Diet: No new diet changes identified    New illness, injury, or hospitalization: No    Medication/supplement changes: As reported previously, Spironolactone started 6/13/22: Potassium-Sparing Diuretics may diminish the anticoagulant effect of Vitamin K Antagonists.    Signs or symptoms of bleeding or clotting: No    Previous INR: Subtherapeutic    Additional findings: Strategically checking on a Monday so we see more of the 5 mg effects to ensure stable on new dose       PLAN     Recommended plan for ongoing change(s) affecting INR     Dosing Instructions: continue your current warfarin dose with next INR in 2-3 weeks       Summary  As of 7/14/2022    Full warfarin instructions:  5 mg every Thu; 4 mg all other days   Next INR check:  8/1/2022             Telephone call with Priya who verbalizes understanding and agrees to plan    Lab visit scheduled     Education provided: Please call back if any changes to your diet, medications or how you've been taking warfarin, Monitoring for bleeding signs and symptoms, Monitoring for clotting signs and symptoms and Importance of notifying clinic for changes in medications; a sooner lab recheck maybe needed.    Plan made per [facility] anticoagulation protocol    Lucretia Go RN  Anticoagulation Clinic  7/15/2022    _______________________________________________________________________     Anticoagulation Episode Summary     Current INR goal:  2.0-3.0   TTR:  84.2 % (1 y)   Target end date:  Indefinite   Send INR reminders to:  [facility]    Indications    Chronic atrial fibrillation (H) [I48.20]  Current use of long term anticoagulation  [Z79.01]           Comments:           Anticoagulation Care Providers     Provider Role Specialty Phone number    Sharmaine Burks MD Referring Internal Medicine [phone]

## 2022-07-21 DIAGNOSIS — I50.32 CHRONIC DIASTOLIC CONGESTIVE HEART FAILURE (H): ICD-10-CM

## 2022-07-22 ENCOUNTER — LAB (OUTPATIENT)
Dept: LAB | Facility: CLINIC | Age: 82
End: 2022-07-22
Payer: COMMERCIAL

## 2022-07-22 DIAGNOSIS — I50.812 CHRONIC RIGHT-SIDED HEART FAILURE (H): ICD-10-CM

## 2022-07-22 DIAGNOSIS — I50.32 CHRONIC DIASTOLIC CONGESTIVE HEART FAILURE (H): ICD-10-CM

## 2022-07-22 LAB
ANION GAP SERPL CALCULATED.3IONS-SCNC: 5 MMOL/L (ref 3–14)
BUN SERPL-MCNC: 30 MG/DL (ref 7–30)
CALCIUM SERPL-MCNC: 9.3 MG/DL (ref 8.5–10.1)
CHLORIDE BLD-SCNC: 104 MMOL/L (ref 94–109)
CO2 SERPL-SCNC: 29 MMOL/L (ref 20–32)
CREAT SERPL-MCNC: 1.18 MG/DL (ref 0.52–1.04)
GFR SERPL CREATININE-BSD FRML MDRD: 47 ML/MIN/1.73M2
GLUCOSE BLD-MCNC: 89 MG/DL (ref 70–99)
POTASSIUM BLD-SCNC: 4 MMOL/L (ref 3.4–5.3)
SODIUM SERPL-SCNC: 138 MMOL/L (ref 133–144)

## 2022-07-22 PROCEDURE — 80048 BASIC METABOLIC PNL TOTAL CA: CPT

## 2022-07-22 PROCEDURE — 36415 COLL VENOUS BLD VENIPUNCTURE: CPT

## 2022-07-25 RX ORDER — TORSEMIDE 10 MG/1
30 TABLET ORAL DAILY
Qty: 180 TABLET | Refills: 0 | Status: SHIPPED | OUTPATIENT
Start: 2022-07-25 | End: 2022-07-26

## 2022-07-25 NOTE — TELEPHONE ENCOUNTER
Routing refill request to provider for review/approval because:  Labs out of range:    Creatinine   Date Value Ref Range Status   07/22/2022 1.18 (H) 0.52 - 1.04 mg/dL Final   06/23/2021 1.29 (H) 0.52 - 1.04 mg/dL Final       Melinda Adams RN

## 2022-07-26 ENCOUNTER — TELEPHONE (OUTPATIENT)
Dept: CARDIOLOGY | Facility: CLINIC | Age: 82
End: 2022-07-26

## 2022-07-26 ENCOUNTER — OFFICE VISIT (OUTPATIENT)
Dept: CARDIOLOGY | Facility: CLINIC | Age: 82
End: 2022-07-26
Attending: NURSE PRACTITIONER
Payer: COMMERCIAL

## 2022-07-26 VITALS
HEIGHT: 64 IN | BODY MASS INDEX: 25.1 KG/M2 | DIASTOLIC BLOOD PRESSURE: 76 MMHG | OXYGEN SATURATION: 97 % | HEART RATE: 74 BPM | SYSTOLIC BLOOD PRESSURE: 114 MMHG | WEIGHT: 147 LBS

## 2022-07-26 DIAGNOSIS — I07.1 TRICUSPID VALVE INSUFFICIENCY, UNSPECIFIED ETIOLOGY: ICD-10-CM

## 2022-07-26 DIAGNOSIS — I50.32 CHRONIC DIASTOLIC CONGESTIVE HEART FAILURE (H): Primary | ICD-10-CM

## 2022-07-26 DIAGNOSIS — I48.20 CHRONIC ATRIAL FIBRILLATION (H): ICD-10-CM

## 2022-07-26 DIAGNOSIS — I10 ESSENTIAL HYPERTENSION: ICD-10-CM

## 2022-07-26 DIAGNOSIS — N18.31 STAGE 3A CHRONIC KIDNEY DISEASE (H): ICD-10-CM

## 2022-07-26 DIAGNOSIS — I50.812 CHRONIC RIGHT-SIDED HEART FAILURE (H): ICD-10-CM

## 2022-07-26 PROCEDURE — 99215 OFFICE O/P EST HI 40 MIN: CPT | Performed by: NURSE PRACTITIONER

## 2022-07-26 RX ORDER — TORSEMIDE 10 MG/1
30 TABLET ORAL DAILY
Qty: 220 TABLET | Refills: 3 | Status: SHIPPED | OUTPATIENT
Start: 2022-07-26 | End: 2022-12-02 | Stop reason: DRUGHIGH

## 2022-07-26 NOTE — PROGRESS NOTES
Cardiology Clinic Progress Note  Priya Funez MRN# [id number]   YOB: 1940 Age: 81 year old   Primary Cardiologist: Dr. Kwok  Reason for visit: CORE follow up             Assessment and Plan:   Priya Funez is a very pleasant 81 year old female who I am seeing today for cardiology follow up.      1.  Chronic diastolic heart failure/HFpEF/right heart failure with severe tricuspid regurgitation:  LVEF 55-60%, RV moderately to severely dilated, RVIDD at base 5.7cm, mild decrease in RV systolic function and severe tricuspid regurgitation.               - NYHA class III, stage C              - Fluid status : mildly hypervolemic              - Diuretic regimen : torsemide 30mg daily, additional 10-20mg PRN for weight gain and/or worsening HF symptoms.               - Aldosterone antagonist : spironolactone 25mg daily              - Blood pressure : controlled  2. Chronic atrial fibrillation s/p AV node ablation 1/2019 - stable, asymptomatic. Most recent device interrogation was stable in 3/2022.               - Continue routine follow up with EP and device clinic              - [id number] score 3 (age, female, HF)              - Continue warfarin for thromboembolic prophylaxis  3. Emergent aortic dissection repair 1/4/19  4. Hypertension - controlled, continue current medications  5. Obesity - counseled patient on weight loss strategies.  6. Mild mitral regurgitation  7. Severe tricuspid regurgitation    I saw patient today for CORE follow up. Overall she continues to do well from a heart failure standpoint, but I do suspect she is carrying more fluid than she is allowing us to diuresis off and not overly forthcoming about her symptoms. Since our last OV I priced out SGLT2 inhibitors which are not financially feasible. She continues to decline consideration for titration of torsemide, noting the frequent urination impacts her quality of life. We did explore today having her take an additional  10-20mg torsemide as needed for worsening symptoms which she was open to. I am reaching out to the pharmacy liaison today to price out Entresto for consideration as the natural diuretic principle may be helpful, will likely only tolerate low dose though given her overall controlled blood pressures.     Lastly she is starting open arms low sodium meals today, she notes what a stress relief this is. She has been eating large quantities of sodium which are making management of her volume status difficult. Hopeful with the implementation of low sodium meals being delivered we will see some improvement in her weight/symtpoms.      Will consider further testing with cardiac MRI in the future to further evaluate RV and TR, ideally after we optimize medications/volume status. She is willing and interested in proceeding with additional imaging if needed.     Changes today: none    Follow up plan:     CORE follow up in 2 months with labs prior     Pharmacy liaison consultation to price out Entresto         History of Presenting Illness:    Priya Funez is a very pleasant 81 year old female with a history of HFpEF, right heart failure, atrial fibrillation s/p AV leo ablation with PPM implantation 1/11/2019, emergent aortic dissection repair 1/4/19, hypertension and obesity.      Patient presented in January with dissecting aortic aneurysm, she was hospitalized from 1/4/19-1/29/19. Patient had a complicated hospital course. There was prolonged ischemia to the SMA and right renal regions. She required tracheostomy placement and was discharged to [facility] on ventilatory support. Patients tracheostomy has been discontinued and she transitioned home the beginning of June.      Post hospitalization she was evaluated by Dr. Morales in July 2019 at which point patient was noted to be volume up on exam. Recommended increasing her torsemide to 20mg BID. Lymphedema clinic referral placed and CORE consult ordered. I first met patient  for CORE enrollment 7/9/19. She has been following closely in CORE clinic since.      She underwent surveillance CT on 8/7/2019 for aortic dissection repair which was stable, showing similar appearance as post repair.      She last saw Dr. Kwok 4/2022 at which point she was doing okay. Recommended echocardiogram. No medication changes.      Echocardiogram 6/2022 showed LVEF 55-60%, RV moderately to severely dilated, RVIDD at base 5.7cm, mild decrease in RV systolic function, severe TR with malcoaptation of the TV leaflet, IVC dilated. When compared to prior echo from 6/2021, increased RV dilation and worsening TR.      During our last OV the end of June she had noted some improvement with the addition of spironolactone. Labs were not available during OV and reviewed afterwards showing stable renal function and electrolytes. I priced out SGLT2 inhibitors which were expensive due to being in coverage gap. Patient was resistant to further adjustments with her medications. Her diet remains high in sodium, application for open arms meals started. We also explored consideration for further testing with cardiac MRI in the future to further evaluate RV and TR, ideally after we optimize medications/volume status.     Patient is here today for CORE follow up.     Patient reports feeling good. Monitoring weights daily a home, states weight has been stable at home ~ 157#. Notes her breathing was more difficult yesterday, notes she was more stressed than usual due to her husbands health. Today reports her breathing is good. Denies shortness of breath at rest. Denies exertional dyspnea with walking from parking lot. Denies orthopnea/PND. Denies LE edema. Denies abdominal distention/bloating. Denies chest pain or chest tightness. Denies dizziness, lightheadedness or other presyncopal symptoms. Denies tachycardia or palpitations. Taking medications daily. Has been taking torsemide 30mg daily.     Labs from last week show  "stable renal function and electrolytes, creatinine improved to 1.18. Blood pressure 114/76 and HR 74 in clinic today.    Appetite good. Really trying to monitor her sodium intake, but still eating multiple meals out a week. \"I don't like cooking\". She has her first open arms meals today. Going to the mall 3-4 days a week to walk, typically walking 15 mins. Also getting activity with yard work. Denies tobacco use. Occasionally will share a little beer with her .         Social History    , 4 children, 8 grandchildren, enjoys her gardens in the summer.   Social History     Socioeconomic History     Marital status:      Spouse name: Not on file     Number of children: Not on file     Years of education: Not on file     Highest education level: Not on file   Occupational History     Occupation: Retired - customer service   Tobacco Use     Smoking status: Never Smoker     Smokeless tobacco: Never Used   Substance and Sexual Activity     Alcohol use: Not Currently     Drug use: No     Sexual activity: Not on file   Other Topics Concern     Parent/sibling w/ CABG, MI or angioplasty before 65F 55M? Not Asked   Social History Narrative    .    Lives in home with . Fully independent.    4 adult children.    8 grandchildren.     Social Determinants of Health     Financial Resource Strain: Not on file   Food Insecurity: Not on file   Transportation Needs: Not on file   Physical Activity: Not on file   Stress: Not on file   Social Connections: Not on file   Intimate Partner Violence: Not on file   Housing Stability: Not on file          Review of Systems:   10 point ROS neg other than the symptoms noted above in the HPI.         Physical Exam:   Vitals: /76   Pulse 74   Ht 1.613 m (5' 3.5\")   Wt 66.7 kg (147 lb)   SpO2 97%   BMI 25.63 kg/m     Wt Readings from Last 4 Encounters:   07/26/22 66.7 kg (147 lb)   06/30/22 72.1 kg (158 lb 14.4 oz)   06/13/22 72.8 kg (160 lb 9.6 oz) "   04/11/22 73 kg (161 lb)     GEN: well nourished, in no acute distress.  HEENT:  Pupils equal, round. Sclerae nonicteric.   NECK: Supple, no masses appreciated. JVP elevated (known severe TR)  C/V:  Regular rate and rhythm, no murmur, rub or gallop.    RESP: Respirations are unlabored. Clear to auscultation bilaterally without wheezing, rales, or rhonchi.  GI: Abdomen soft, nontender.  EXTREM: No LE edema.  NEURO: Alert and oriented, cooperative.  SKIN: Warm and dry       Data:     LIPID RESULTS:  Lab Results   Component Value Date    CHOL 134 06/14/2021    HDL 53 06/14/2021    LDL 67 06/14/2021    TRIG 71 06/14/2021     LIVER ENZYME RESULTS:  Lab Results   Component Value Date    AST 27 04/04/2019    ALT 22 06/14/2021     CBC RESULTS:  Lab Results   Component Value Date    WBC 5.3 06/14/2021    RBC 4.33 06/14/2021    HGB 12.6 03/11/2022    HGB 12.8 06/14/2021    HCT 40.1 06/14/2021    MCV 93 06/14/2021    MCH 29.6 06/14/2021    MCHC 31.9 06/14/2021    RDW 13.5 06/14/2021     06/14/2021     BMP RESULTS:  Lab Results   Component Value Date     07/22/2022     06/23/2021    POTASSIUM 4.0 07/22/2022    POTASSIUM 4.5 06/23/2021    CHLORIDE 104 07/22/2022    CHLORIDE 104 06/23/2021    CO2 29 07/22/2022    CO2 31 06/23/2021    ANIONGAP 5 07/22/2022    ANIONGAP 3 06/23/2021    GLC 89 07/22/2022    GLC 86 06/23/2021    BUN 30 07/22/2022    BUN 30 06/23/2021    CR 1.18 (H) 07/22/2022    CR 1.29 (H) 06/23/2021    GFRESTIMATED 47 (L) 07/22/2022    GFRESTIMATED 39 (L) 06/23/2021    GFRESTBLACK 46 (L) 06/23/2021    BESS 9.3 07/22/2022    BESS 9.2 06/23/2021      A1C RESULTS:  Lab Results   Component Value Date    A1C 5.6 01/30/2019    A1C 5.3 01/04/2019     INR RESULTS:  Lab Results   Component Value Date    INR 2.7 (H) 07/14/2022    INR 1.9 (H) 06/30/2022    INR 2.43 (H) 03/11/2022    INR 2.30 (H) 07/09/2021    INR 2.40 (H) 06/10/2021          Medications     Current Outpatient Medications   Medication Sig  Dispense Refill     Acetaminophen 325 MG CAPS Take 325-650 mg by mouth every 4 hours as needed       albuterol (PROAIR HFA/PROVENTIL HFA/VENTOLIN HFA) 108 (90 Base) MCG/ACT inhaler Inhale 2 puffs into the lungs every 6 hours as needed for shortness of breath / dyspnea or wheezing 18 g 0     aspirin (ASA) 81 MG EC tablet Take 81 mg by mouth every 24 hours       cholecalciferol 25 MCG (1000 UT) TABS Take 1,000 Units by mouth daily        cyanocobalamin (VITAMIN B-12) 100 MCG tablet Take 1,000 mcg by mouth daily        escitalopram (LEXAPRO) 10 MG tablet Take 1 tablet (10 mg) by mouth daily 90 tablet 3     metoprolol tartrate (LOPRESSOR) 25 MG tablet Take 1 tablet (25 mg) by mouth 2 times daily 180 tablet 3     mirtazapine (REMERON) 15 MG tablet Take 1 tablet (15 mg) by mouth At Bedtime 90 tablet 3     spironolactone (ALDACTONE) 25 MG tablet Take 1 tablet (25 mg) by mouth daily 30 tablet 1     torsemide (DEMADEX) 10 MG tablet Take 3 tablets (30 mg) by mouth daily 180 tablet 0     warfarin ANTICOAGULANT (COUMADIN) 2 MG tablet Current dose: 4 mg (2 tablets) daily or as instructed by ACC team. 185 tablet 1        Past Medical History     Past Medical History:   Diagnosis Date     Benign essential hypertension      Chronic atrial fibrillation (H)     s/p AV node ablation January, 2019     Chronic diastolic heart failure (H)      Chronic kidney disease, stage III (moderate) (H)      Past Surgical History:   Procedure Laterality Date     ANGIOGRAM Right 01/04/2019    Procedure: DIAGONSTIC ANGIOGRAM OF SMA;  Surgeon: Rigo Jennings MD;  Location: [facility] OR     BIOPSY BREAST       BYPASS GRAFT ARTERY CORONARY, REPAIR VALVE AORTIC, COMBINED N/A 01/04/2019    Procedure: AORTIC DISSECTION REPAIR WITH GRAFT- HEMASHIELD PLATINUM WOVEN DOUBLE VELOR 4 SIDE ARM VASCULAR GRAFT, D:35 X 12 X 10 X 10MM/ L:50CM;  Surgeon: Jose Winslow MD;  Location: [facility] OR     EP ABLATION AV NODE N/A 01/11/2019    Procedure: EP Ablation AV  Node;  Surgeon: Tsering Davey MD;  Location:  [facility]     EP PACEMAKER Left 01/11/2019    Procedure: EP Pacemaker;  Surgeon: Tsering Davey MD;  Location:  [facility] CATH LAB     THYROID SURGERY      benign mass removed     TRACHEOSTOMY N/A 01/25/2019    Procedure: TRACHEOSTOMY  (DR MCCLELLAND);  Surgeon: Bryson Mcclelland MD;  Location:  OR     TUBAL LIGATION       Family History   Problem Relation Age of Onset     Diabetes No family hx of      Myocardial Infarction No family hx of      Cerebrovascular Disease No family hx of      Coronary Artery Disease Early Onset No family hx of      Breast Cancer No family hx of      Colon Cancer No family hx of      Ovarian Cancer No family hx of      Ataxia Mother      Heart Disease Father             Allergies   Amoxicillin and Ciprofloxacin    40 minutes spent on the date of the encounter doing chart review, history and exam, documentation and further activities as noted above      TAMRA Ryan [facility]  Pager: [phone]

## 2022-07-26 NOTE — LETTER
7/26/2022    Sharmaine Burks MD  [address] [facility] [address]    RE: Priya Funez       Dear Colleague,     I had the pleasure of seeing Priya Funez in the [facility].  Cardiology Clinic Progress Note  Priya Funez MRN# [id number]   YOB: 1940 Age: 81 year old   Primary Cardiologist: Dr. Kwok  Reason for visit: CORE follow up             Assessment and Plan:   Priya Funez is a very pleasant 81 year old female who I am seeing today for cardiology follow up.      1.  Chronic diastolic heart failure/HFpEF/right heart failure with severe tricuspid regurgitation:  LVEF 55-60%, RV moderately to severely dilated, RVIDD at base 5.7cm, mild decrease in RV systolic function and severe tricuspid regurgitation.               - NYHA class III, stage C              - Fluid status : mildly hypervolemic              - Diuretic regimen : torsemide 30mg daily, additional 10-20mg PRN for weight gain and/or worsening HF symptoms.               - Aldosterone antagonist : spironolactone 25mg daily              - Blood pressure : controlled  2. Chronic atrial fibrillation s/p AV node ablation 1/2019 - stable, asymptomatic. Most recent device interrogation was stable in 3/2022.               - Continue routine follow up with EP and device clinic              - [id number] score 3 (age, female, HF)              - Continue warfarin for thromboembolic prophylaxis  3. Emergent aortic dissection repair 1/4/19  4. Hypertension - controlled, continue current medications  5. Obesity - counseled patient on weight loss strategies.  6. Mild mitral regurgitation  7. Severe tricuspid regurgitation    I saw patient today for CORE follow up. Overall she continues to do well from a heart failure standpoint, but I do suspect she is carrying more fluid than she is allowing us to diuresis off and not overly forthcoming about her symptoms. Since our last OV I priced out SGLT2 inhibitors which  are not financially feasible. She continues to decline consideration for titration of torsemide, noting the frequent urination impacts her quality of life. We did explore today having her take an additional 10-20mg torsemide as needed for worsening symptoms which she was open to. I am reaching out to the pharmacy liaison today to price out Entresto for consideration as the natural diuretic principle may be helpful, will likely only tolerate low dose though given her overall controlled blood pressures.     Lastly she is starting open arms low sodium meals today, she notes what a stress relief this is. She has been eating large quantities of sodium which are making management of her volume status difficult. Hopeful with the implementation of low sodium meals being delivered we will see some improvement in her weight/symtpoms.      Will consider further testing with cardiac MRI in the future to further evaluate RV and TR, ideally after we optimize medications/volume status. She is willing and interested in proceeding with additional imaging if needed.     Changes today: none    Follow up plan:     CORE follow up in 2 months with labs prior     Pharmacy liaison consultation to price out Entresto         History of Presenting Illness:    Priya Funez is a very pleasant 81 year old female with a history of HFpEF, right heart failure, atrial fibrillation s/p AV leo ablation with PPM implantation 1/11/2019, emergent aortic dissection repair 1/4/19, hypertension and obesity.      Patient presented in January with dissecting aortic aneurysm, she was hospitalized from 1/4/19-1/29/19. Patient had a complicated hospital course. There was prolonged ischemia to the SMA and right renal regions. She required tracheostomy placement and was discharged to [facility] on ventilatory support. Patients tracheostomy has been discontinued and she transitioned home the beginning of June.      Post hospitalization she was evaluated by   [facility] in July 2019 at which point patient was noted to be volume up on exam. Recommended increasing her torsemide to 20mg BID. Lymphedema clinic referral placed and CORE consult ordered. I first met patient for CORE enrollment 7/9/19. She has been following closely in CORE clinic since.      She underwent surveillance CT on 8/7/2019 for aortic dissection repair which was stable, showing similar appearance as post repair.      She last saw Dr. Kwok 4/2022 at which point she was doing okay. Recommended echocardiogram. No medication changes.      Echocardiogram 6/2022 showed LVEF 55-60%, RV moderately to severely dilated, RVIDD at base 5.7cm, mild decrease in RV systolic function, severe TR with malcoaptation of the TV leaflet, IVC dilated. When compared to prior echo from 6/2021, increased RV dilation and worsening TR.      During our last OV the end of June she had noted some improvement with the addition of spironolactone. Labs were not available during OV and reviewed afterwards showing stable renal function and electrolytes. I priced out SGLT2 inhibitors which were expensive due to being in coverage gap. Patient was resistant to further adjustments with her medications. Her diet remains high in sodium, application for open arms meals started. We also explored consideration for further testing with cardiac MRI in the future to further evaluate RV and TR, ideally after we optimize medications/volume status.     Patient is here today for CORE follow up.     Patient reports feeling good. Monitoring weights daily a home, states weight has been stable at home ~ 157#. Notes her breathing was more difficult yesterday, notes she was more stressed than usual due to her husbands health. Today reports her breathing is good. Denies shortness of breath at rest. Denies exertional dyspnea with walking from parking lot. Denies orthopnea/PND. Denies LE edema. Denies abdominal distention/bloating. Denies chest pain or chest  "tightness. Denies dizziness, lightheadedness or other presyncopal symptoms. Denies tachycardia or palpitations. Taking medications daily. Has been taking torsemide 30mg daily.     Labs from last week show stable renal function and electrolytes, creatinine improved to 1.18. Blood pressure 114/76 and HR 74 in clinic today.    Appetite good. Really trying to monitor her sodium intake, but still eating multiple meals out a week. \"I don't like cooking\". She has her first open arms meals today. Going to the mall 3-4 days a week to walk, typically walking 15 mins. Also getting activity with yard work. Denies tobacco use. Occasionally will share a little beer with her .         Social History    , 4 children, 8 grandchildren, enjoys her gardens in the summer.   Social History     Socioeconomic History     Marital status:      Spouse name: Not on file     Number of children: Not on file     Years of education: Not on file     Highest education level: Not on file   Occupational History     Occupation: Retired - customer service   Tobacco Use     Smoking status: Never Smoker     Smokeless tobacco: Never Used   Substance and Sexual Activity     Alcohol use: Not Currently     Drug use: No     Sexual activity: Not on file   Other Topics Concern     Parent/sibling w/ CABG, MI or angioplasty before 65F 55M? Not Asked   Social History Narrative    .    Lives in home with . Fully independent.    4 adult children.    8 grandchildren.     Social Determinants of Health     Financial Resource Strain: Not on file   Food Insecurity: Not on file   Transportation Needs: Not on file   Physical Activity: Not on file   Stress: Not on file   Social Connections: Not on file   Intimate Partner Violence: Not on file   Housing Stability: Not on file          Review of Systems:   10 point ROS neg other than the symptoms noted above in the HPI.         Physical Exam:   Vitals: /76   Pulse 74   Ht 1.613 m (5' " "3.5\")   Wt 66.7 kg (147 lb)   SpO2 97%   BMI 25.63 kg/m     Wt Readings from Last 4 Encounters:   07/26/22 66.7 kg (147 lb)   06/30/22 72.1 kg (158 lb 14.4 oz)   06/13/22 72.8 kg (160 lb 9.6 oz)   04/11/22 73 kg (161 lb)     GEN: well nourished, in no acute distress.  HEENT:  Pupils equal, round. Sclerae nonicteric.   NECK: Supple, no masses appreciated. JVP elevated (known severe TR)  C/V:  Regular rate and rhythm, no murmur, rub or gallop.    RESP: Respirations are unlabored. Clear to auscultation bilaterally without wheezing, rales, or rhonchi.  GI: Abdomen soft, nontender.  EXTREM: No LE edema.  NEURO: Alert and oriented, cooperative.  SKIN: Warm and dry       Data:     LIPID RESULTS:  Lab Results   Component Value Date    CHOL 134 06/14/2021    HDL 53 06/14/2021    LDL 67 06/14/2021    TRIG 71 06/14/2021     LIVER ENZYME RESULTS:  Lab Results   Component Value Date    AST 27 04/04/2019    ALT 22 06/14/2021     CBC RESULTS:  Lab Results   Component Value Date    WBC 5.3 06/14/2021    RBC 4.33 06/14/2021    HGB 12.6 03/11/2022    HGB 12.8 06/14/2021    HCT 40.1 06/14/2021    MCV 93 06/14/2021    MCH 29.6 06/14/2021    MCHC 31.9 06/14/2021    RDW 13.5 06/14/2021     06/14/2021     BMP RESULTS:  Lab Results   Component Value Date     07/22/2022     06/23/2021    POTASSIUM 4.0 07/22/2022    POTASSIUM 4.5 06/23/2021    CHLORIDE 104 07/22/2022    CHLORIDE 104 06/23/2021    CO2 29 07/22/2022    CO2 31 06/23/2021    ANIONGAP 5 07/22/2022    ANIONGAP 3 06/23/2021    GLC 89 07/22/2022    GLC 86 06/23/2021    BUN 30 07/22/2022    BUN 30 06/23/2021    CR 1.18 (H) 07/22/2022    CR 1.29 (H) 06/23/2021    GFRESTIMATED 47 (L) 07/22/2022    GFRESTIMATED 39 (L) 06/23/2021    GFRESTBLACK 46 (L) 06/23/2021    BESS 9.3 07/22/2022    BESS 9.2 06/23/2021      A1C RESULTS:  Lab Results   Component Value Date    A1C 5.6 01/30/2019    A1C 5.3 01/04/2019     INR RESULTS:  Lab Results   Component Value Date    INR 2.7 " (H) 07/14/2022    INR 1.9 (H) 06/30/2022    INR 2.43 (H) 03/11/2022    INR 2.30 (H) 07/09/2021    INR 2.40 (H) 06/10/2021          Medications     Current Outpatient Medications   Medication Sig Dispense Refill     Acetaminophen 325 MG CAPS Take 325-650 mg by mouth every 4 hours as needed       albuterol (PROAIR HFA/PROVENTIL HFA/VENTOLIN HFA) 108 (90 Base) MCG/ACT inhaler Inhale 2 puffs into the lungs every 6 hours as needed for shortness of breath / dyspnea or wheezing 18 g 0     aspirin (ASA) 81 MG EC tablet Take 81 mg by mouth every 24 hours       cholecalciferol 25 MCG (1000 UT) TABS Take 1,000 Units by mouth daily        cyanocobalamin (VITAMIN B-12) 100 MCG tablet Take 1,000 mcg by mouth daily        escitalopram (LEXAPRO) 10 MG tablet Take 1 tablet (10 mg) by mouth daily 90 tablet 3     metoprolol tartrate (LOPRESSOR) 25 MG tablet Take 1 tablet (25 mg) by mouth 2 times daily 180 tablet 3     mirtazapine (REMERON) 15 MG tablet Take 1 tablet (15 mg) by mouth At Bedtime 90 tablet 3     spironolactone (ALDACTONE) 25 MG tablet Take 1 tablet (25 mg) by mouth daily 30 tablet 1     torsemide (DEMADEX) 10 MG tablet Take 3 tablets (30 mg) by mouth daily 180 tablet 0     warfarin ANTICOAGULANT (COUMADIN) 2 MG tablet Current dose: 4 mg (2 tablets) daily or as instructed by ACC team. 185 tablet 1        Past Medical History     Past Medical History:   Diagnosis Date     Benign essential hypertension      Chronic atrial fibrillation (H)     s/p AV node ablation January, 2019     Chronic diastolic heart failure (H)      Chronic kidney disease, stage III (moderate) (H)      Past Surgical History:   Procedure Laterality Date     ANGIOGRAM Right 01/04/2019    Procedure: DIAGONSTIC ANGIOGRAM OF SMA;  Surgeon: Rigo Jennings MD;  Location: [facility] OR     BIOPSY BREAST       BYPASS GRAFT ARTERY CORONARY, REPAIR VALVE AORTIC, COMBINED N/A 01/04/2019    Procedure: AORTIC DISSECTION REPAIR WITH GRAFT- HEMASHIELD PLATINUM WOVEN  DOUBLE VELOR 4 SIDE ARM VASCULAR GRAFT, D:35 X 12 X 10 X 10MM/ L:50CM;  Surgeon: Jose Winslow MD;  Location: [facility] OR     EP ABLATION AV NODE N/A 01/11/2019    Procedure: EP Ablation AV Node;  Surgeon: Tsering Davey MD;  Location:  HEART CARDIAC CATH LAB     EP PACEMAKER Left 01/11/2019    Procedure: EP Pacemaker;  Surgeon: Tsering Davey MD;  Location:  HEART CARDIAC CATH LAB     THYROID SURGERY      benign mass removed     TRACHEOSTOMY N/A 01/25/2019    Procedure: TRACHEOSTOMY  (DR MCCLELLAND);  Surgeon: Bryson Mcclelland MD;  Location: [facility] OR     TUBAL LIGATION       Family History   Problem Relation Age of Onset     Diabetes No family hx of      Myocardial Infarction No family hx of      Cerebrovascular Disease No family hx of      Coronary Artery Disease Early Onset No family hx of      Breast Cancer No family hx of      Colon Cancer No family hx of      Ovarian Cancer No family hx of      Ataxia Mother      Heart Disease Father             Allergies   Amoxicillin and Ciprofloxacin    40 minutes spent on the date of the encounter doing chart review, history and exam, documentation and further activities as noted above      TAMRA Ryan CNP  [facility]  Pager: [phone]      Thank you for allowing me to participate in the care of your patient.      Sincerely,     TAMRA Ryan CNP     [facility]  cc:   TAMRA Andres CNP  [address]  [place] [address]

## 2022-07-26 NOTE — PATIENT INSTRUCTIONS
Call CORE nurse for any questions or concerns:  [phone]   *If you have concerns after hours, please call [phone], option 2 to speak with on call Cardiologist.    1. Medication changes and/or recommendations from today:     - No medication changes   - If more short of breath take extra torsemide     2. Follow up plan:    - Start open arms meals   - Follow with CORE in 2 months with labs prior      3. Weigh yourself daily and write it down.     4. Call CORE nurse if your weight is up more than 2 pounds in one day or 5 pounds in one week.     5. Call CORE nurse if you feel more short of breath, have more abdominal bloating, or leg swelling.     6. Continue low sodium diet (less than 2000 mg daily). If you eat less salt, you will retain less fluid.     7. Alcohol can weaken your heart further. You should avoid alcohol or limit its use to special times, such as a holiday or birthday.      8. Do NOT take Aleve or ibuprofen without talking to your doctor first.      9. Lab Results:   Component      Latest Ref Rng & Units 7/22/2022   Sodium      133 - 144 mmol/L 138   Potassium      3.4 - 5.3 mmol/L 4.0   Chloride      94 - 109 mmol/L 104   Carbon Dioxide      20 - 32 mmol/L 29   Anion Gap      3 - 14 mmol/L 5   Urea Nitrogen      7 - 30 mg/dL 30   Creatinine      0.52 - 1.04 mg/dL 1.18 (H)   Calcium      8.5 - 10.1 mg/dL 9.3   Glucose      70 - 99 mg/dL 89   GFR Estimate      >60 mL/min/1.73m2 47 (L)        CORE Clinic: Cardiomyopathy, Optimization, Rehabilitation, Education  The CORE Clinic is a heart failure specialty clinic within the [facility] where you will work with specialized nurse practitioners, physician assistants, doctors, and registered nurses. They are dedicated to helping patients with heart failure to carefully adjust medications, receive education, and learn who and when to call if symptoms develop. They specialize in helping you better understand your condition, slow the progression  of your disease, improve the length and quality of your life, help you detect future heart problems before they become life threatening, and avoid hospitalizations.

## 2022-07-27 NOTE — TELEPHONE ENCOUNTER
Below is pricing information for Entresto for consideration in the future.     TAMRA Ryan CNP  [facility]  Pager: [phone]      ----- Message from Saige Marrero sent at 7/26/2022 10:15 AM CDT -----  Regarding: RE: Price out Entresto  Patient has Medicare D through [facility] with $200 (of $200) unmet deductible.     Entresto:  July:  $235 (fulfills $200 deductible)  Aug-Dec:  $35/mo     Saige Marrero  Pharmacy Technician/Liaison, Discharge Pharmacy   [phone]  [email or web address]      Anticoagulation coverage check   ----- Message -----  From: Xochitl Eden APRN CNP  Sent: 7/26/2022   8:21 AM CDT  To: Saige Marrero  Subject: [place] out Entresto                               RAVI Moulton,    Can you price out Entresto for this patient?    Thanks,  Xochitl

## 2022-08-01 ENCOUNTER — ANTICOAGULATION THERAPY VISIT (OUTPATIENT)
Dept: ANTICOAGULATION | Facility: CLINIC | Age: 82
End: 2022-08-01

## 2022-08-01 ENCOUNTER — LAB (OUTPATIENT)
Dept: LAB | Facility: CLINIC | Age: 82
End: 2022-08-01
Payer: COMMERCIAL

## 2022-08-01 DIAGNOSIS — Z79.01 CURRENT USE OF LONG TERM ANTICOAGULATION: ICD-10-CM

## 2022-08-01 DIAGNOSIS — I48.20 CHRONIC ATRIAL FIBRILLATION (H): Primary | ICD-10-CM

## 2022-08-01 DIAGNOSIS — I48.20 CHRONIC ATRIAL FIBRILLATION (H): ICD-10-CM

## 2022-08-01 LAB — INR BLD: 3.1 (ref 0.9–1.1)

## 2022-08-01 PROCEDURE — 36416 COLLJ CAPILLARY BLOOD SPEC: CPT

## 2022-08-01 PROCEDURE — 85610 PROTHROMBIN TIME: CPT

## 2022-08-01 NOTE — PROGRESS NOTES
ANTICOAGULATION MANAGEMENT     Priya Funez 81 year old female is on warfarin with supratherapeutic INR result. (Goal INR 2.0-3.0)    Recent labs: (last 7 days)     08/01/22  1559   INR 3.1*       ASSESSMENT       Source(s): Chart Review and Patient/Caregiver Call       Warfarin doses taken: Warfarin taken as instructed    Diet: Decreased greens/vitamin K in diet; plans to resume previous intake    New illness, injury, or hospitalization: No    Medication/supplement changes: None noted    Signs or symptoms of bleeding or clotting: No    Previous INR: Therapeutic last visit; previously outside of goal range    Additional findings: None       PLAN     Recommended plan for temporary change(s) affecting INR     Dosing Instructions: partial hold then continue your current warfarin dose with next INR in 2 weeks       Summary  As of 8/1/2022    Full warfarin instructions:  8/1: 2 mg; Otherwise 5 mg every Thu; 4 mg all other days   Next INR check:  8/15/2022             Telephone call with Priya who verbalizes understanding and agrees to plan    Lab visit scheduled    Education provided: Please call back if any changes to your diet, medications or how you've been taking warfarin    Plan made per [facility] anticoagulation protocol    Marcelino Palomares RN  Anticoagulation Clinic  8/1/2022    _______________________________________________________________________     Anticoagulation Episode Summary     Current INR goal:  2.0-3.0   TTR:  83.0 % (1 y)   Target end date:  Indefinite   Send INR reminders to:  [facility]    Indications    Chronic atrial fibrillation (H) [I48.20]  Current use of long term anticoagulation [Z79.01]           Comments:           Anticoagulation Care Providers     Provider Role Specialty Phone number    Sharmaine Burks MD Referring Internal Medicine [phone]

## 2022-08-10 DIAGNOSIS — I50.812 CHRONIC RIGHT-SIDED HEART FAILURE (H): ICD-10-CM

## 2022-08-10 DIAGNOSIS — I50.32 CHRONIC DIASTOLIC CONGESTIVE HEART FAILURE (H): ICD-10-CM

## 2022-08-10 RX ORDER — SPIRONOLACTONE 25 MG/1
25 TABLET ORAL DAILY
Qty: 90 TABLET | Refills: 3 | Status: SHIPPED | OUTPATIENT
Start: 2022-08-10 | End: 2023-01-01

## 2022-08-10 NOTE — TELEPHONE ENCOUNTER
Received refill request for:  spironolactone  Last OV was: 7/26/22  Labs:     Future Appointments   Date Time Provider Department Center   8/15/2022  2:30 PM [facility] OX   9/23/2022  9:45 AM [facility] LAB [facility]   9/27/2022  8:50 AM [facility]   9/30/2022 12[facility]     New script sent to: Lesa Maloney RN BSN   12:15 PM 08/10/22  CORE nurse line M-F 8a-4p: [phone]

## 2022-08-17 ENCOUNTER — LAB (OUTPATIENT)
Dept: LAB | Facility: CLINIC | Age: 82
End: 2022-08-17
Payer: COMMERCIAL

## 2022-08-17 ENCOUNTER — ANTICOAGULATION THERAPY VISIT (OUTPATIENT)
Dept: ANTICOAGULATION | Facility: CLINIC | Age: 82
End: 2022-08-17

## 2022-08-17 DIAGNOSIS — Z79.01 CURRENT USE OF LONG TERM ANTICOAGULATION: ICD-10-CM

## 2022-08-17 DIAGNOSIS — I48.20 CHRONIC ATRIAL FIBRILLATION (H): Primary | ICD-10-CM

## 2022-08-17 DIAGNOSIS — I48.20 CHRONIC ATRIAL FIBRILLATION (H): ICD-10-CM

## 2022-08-17 LAB — INR BLD: 3.1 (ref 0.9–1.1)

## 2022-08-17 PROCEDURE — 36416 COLLJ CAPILLARY BLOOD SPEC: CPT

## 2022-08-17 PROCEDURE — 85610 PROTHROMBIN TIME: CPT

## 2022-08-17 NOTE — PROGRESS NOTES
ANTICOAGULATION MANAGEMENT     Priya MARISOL Funez 81 year old female is on warfarin with supratherapeutic INR result. (Goal INR 2.0-3.0)    Recent labs: (last 7 days)     08/17/22  1544   INR 3.1*       ASSESSMENT       Source(s): Chart Review    Previous INR was Supratherapeutic    Medication, diet, health changes since last INR chart reviewed; none identified    Second supra in a row, so 3.4% MD decrease pended based on assessment       PLAN     Unable to reach Priya today.    Left message to continue current dose of warfarin 4 mg tonight. Request call back for assessment.    Follow up required to discuss out of range result     Marcelino Palomares RN  Anticoagulation Clinic  8/17/2022

## 2022-08-18 ENCOUNTER — TELEPHONE (OUTPATIENT)
Dept: INTERNAL MEDICINE | Facility: CLINIC | Age: 82
End: 2022-08-18

## 2022-08-18 NOTE — PROGRESS NOTES
ANTICOAGULATION MANAGEMENT     Priya Funez 81 year old female is on warfarin with supratherapeutic INR result. (Goal INR 2.0-3.0)    Recent labs: (last 7 days)     08/17/22  1544   INR 3.1*       ASSESSMENT       Source(s): Chart Review and Patient/Caregiver Call       Warfarin doses taken: Warfarin taken as instructed    Diet: No new diet changes identified    New illness, injury, or hospitalization: No    Medication/supplement changes: None noted    Signs or symptoms of bleeding or clotting: No    Previous INR: Supratherapeutic    Additional findings: None       PLAN     Recommended plan for no diet, medication or health factor changes affecting INR     Dosing Instructions: decreased dosing by 3.4% then continue regular dosing       Summary  As of 8/17/2022    Full warfarin instructions:  4 mg every day   Next INR check:  8/31/2022             Telephone call with Priya who verbalizes understanding and agrees to plan    Lab visit scheduled    Education provided: Please call back if any changes to your diet, medications or how you've been taking warfarin    Plan made per [facility] anticoagulation protocol    Christine Adames RN  Anticoagulation Clinic  8/18/2022    _______________________________________________________________________     Anticoagulation Episode Summary     Current INR goal:  2.0-3.0   TTR:  78.6 % (1 y)   Target end date:  Indefinite   Send INR reminders to:  [facility]    Indications    Chronic atrial fibrillation (H) [I48.20]  Current use of long term anticoagulation [Z79.01]           Comments:           Anticoagulation Care Providers     Provider Role Specialty Phone number    Sharmaine Burks MD Referring Internal Medicine [phone]

## 2022-08-18 NOTE — PROGRESS NOTES
Called again, still no answer. Left another vm to return call to ACC RNs  Amara JHA, RN  Anticoagulation Team

## 2022-09-01 ENCOUNTER — ANTICOAGULATION THERAPY VISIT (OUTPATIENT)
Dept: ANTICOAGULATION | Facility: CLINIC | Age: 82
End: 2022-09-01

## 2022-09-01 ENCOUNTER — LAB (OUTPATIENT)
Dept: LAB | Facility: CLINIC | Age: 82
End: 2022-09-01
Payer: COMMERCIAL

## 2022-09-01 DIAGNOSIS — I48.20 CHRONIC ATRIAL FIBRILLATION (H): ICD-10-CM

## 2022-09-01 DIAGNOSIS — I48.20 CHRONIC ATRIAL FIBRILLATION (H): Primary | ICD-10-CM

## 2022-09-01 DIAGNOSIS — Z79.01 CURRENT USE OF LONG TERM ANTICOAGULATION: ICD-10-CM

## 2022-09-01 LAB — INR BLD: 3 (ref 0.9–1.1)

## 2022-09-01 PROCEDURE — 85610 PROTHROMBIN TIME: CPT

## 2022-09-01 PROCEDURE — 36416 COLLJ CAPILLARY BLOOD SPEC: CPT

## 2022-09-01 NOTE — PROGRESS NOTES
ANTICOAGULATION MANAGEMENT     Priya MARISOL Funez 81 year old female is on warfarin with therapeutic INR result. (Goal INR 2.0-3.0)    Recent labs: (last 7 days)     09/01/22  1502   INR 3.0*       ASSESSMENT       Source(s): Chart Review and Patient/Caregiver Call       Warfarin doses taken: Warfarin taken as instructed    Diet: No new diet changes identified    New illness, injury, or hospitalization: No    Medication/supplement changes: None noted    Signs or symptoms of bleeding or clotting: No    Previous INR: Supratherapeutic    Additional findings: None       PLAN     Recommended plan for no diet, medication or health factor changes affecting INR     Dosing Instructions: Continue your current warfarin dose with next INR in 3 weeks       Summary  As of 9/1/2022    Full warfarin instructions:  4 mg every day   Next INR check:  9/22/2022             Telephone call with Priya who verbalizes understanding and agrees to plan    Lab visit scheduled    Education provided: Please call back if any changes to your diet, medications or how you've been taking warfarin    Plan made per [facility] anticoagulation protocol    Christine Adames RN  Anticoagulation Clinic  9/1/2022    _______________________________________________________________________     Anticoagulation Episode Summary     Current INR goal:  2.0-3.0   TTR:  76.8 % (1 y)   Target end date:  Indefinite   Send IN[facility]ers [facility]:  [facility]    Indications    Chronic atrial fibrillation (H) [I48.20]  Current use of long term anticoagulation [Z79.01]           Comments:           Anticoagulation Care Providers     Provider Role Specialty Phone number    Sharmaine Burks MD Referring Internal Medicine [phone]

## 2022-09-09 ENCOUNTER — CARE COORDINATION (OUTPATIENT)
Dept: CARDIOLOGY | Facility: CLINIC | Age: 82
End: 2022-09-09

## 2022-09-09 DIAGNOSIS — I48.19 PERSISTENT ATRIAL FIBRILLATION (H): ICD-10-CM

## 2022-09-09 DIAGNOSIS — I50.32 CHRONIC DIASTOLIC CONGESTIVE HEART FAILURE (H): ICD-10-CM

## 2022-09-09 DIAGNOSIS — I10 BENIGN ESSENTIAL HYPERTENSION: ICD-10-CM

## 2022-09-09 RX ORDER — METOPROLOL TARTRATE 25 MG/1
25 TABLET, FILM COATED ORAL 2 TIMES DAILY
Qty: 180 TABLET | Refills: 3 | Status: SHIPPED | OUTPATIENT
Start: 2022-09-09 | End: 2023-01-01

## 2022-09-09 NOTE — PROGRESS NOTES
[facility] - CORE Clinic    Incoming call from patient requesting refill for metoprolol(lopressor) 25mg po bid. Patient last seen by Xochitl Eden on 7/26. Refill sent to patients preferred pharmacy.  Future Appointments   Date Time Provider Department Center   9/22/2022  2:45 PM [facility] OX   9/23/2022  9:45 AM [facility] LAB [facility]   9/27/2022  8:50 AM [facility]   9/30/2022 12:00 AM HATCH [address] [place] [address] PSA CLIN       Susie Elena RN on 9/9/2022 at 11:24 AM

## 2022-09-19 DIAGNOSIS — G47.9 TROUBLE IN SLEEPING: ICD-10-CM

## 2022-09-21 ENCOUNTER — ANTICOAGULATION THERAPY VISIT (OUTPATIENT)
Dept: ANTICOAGULATION | Facility: CLINIC | Age: 82
End: 2022-09-21

## 2022-09-21 ENCOUNTER — LAB (OUTPATIENT)
Dept: LAB | Facility: CLINIC | Age: 82
End: 2022-09-21
Payer: COMMERCIAL

## 2022-09-21 DIAGNOSIS — I48.20 CHRONIC ATRIAL FIBRILLATION (H): Primary | ICD-10-CM

## 2022-09-21 DIAGNOSIS — I48.20 CHRONIC ATRIAL FIBRILLATION (H): ICD-10-CM

## 2022-09-21 DIAGNOSIS — Z79.01 CURRENT USE OF LONG TERM ANTICOAGULATION: ICD-10-CM

## 2022-09-21 LAB — INR BLD: 2.9 (ref 0.9–1.1)

## 2022-09-21 PROCEDURE — 85610 PROTHROMBIN TIME: CPT

## 2022-09-21 PROCEDURE — 36415 COLL VENOUS BLD VENIPUNCTURE: CPT

## 2022-09-21 RX ORDER — MIRTAZAPINE 15 MG/1
15 TABLET, FILM COATED ORAL AT BEDTIME
Qty: 90 TABLET | Refills: 1 | Status: SHIPPED | OUTPATIENT
Start: 2022-09-21 | End: 2023-01-01

## 2022-09-21 NOTE — PROGRESS NOTES
ANTICOAGULATION MANAGEMENT     Priya DAMON Armin 81 year old female is on warfarin with therapeutic INR result. (Goal INR 2.0-3.0)    Recent labs: (last 7 days)     09/21/22  1227   INR 2.9*       ASSESSMENT       Source(s): Chart Review    Previous INR was Therapeutic last visit; previously outside of goal range    Medication, diet, health changes since last INR chart reviewed; none identified       PLAN     Unable to reach Priya today.    Left message to continue current dose of warfarin 4 mg tonight. Request call back for assessment.    Follow up required to confirm warfarin dose taken and assess for changes    Lucretia Go RN  Anticoagulation Clinic  9/21/2022

## 2022-09-21 NOTE — PROGRESS NOTES
ANTICOAGULATION MANAGEMENT     Priya MARISOL Funez 81 year old female is on warfarin with therapeutic INR result. (Goal INR 2.0-3.0)    Recent labs: (last 7 days)     09/21/22  1227   INR 2.9*       ASSESSMENT       Source(s): Chart Review and Patient/Caregiver Call       Warfarin doses taken: Warfarin taken as instructed    Diet: No new diet changes identified    New illness, injury, or hospitalization: No    Medication/supplement changes: None noted    Signs or symptoms of bleeding or clotting: No    Previous INR: Therapeutic last visit; previously outside of goal range    Additional findings: None       PLAN     Recommended plan for no diet, medication or health factor changes affecting INR     Dosing Instructions: Continue your current warfarin dose with next INR in 3 weeks       Summary  As of 9/21/2022    Full warfarin instructions:  4 mg every day   Next INR check:  10/12/2022             Telephone call with Priya who verbalizes understanding and agrees to plan    Lab visit scheduled    Education provided: Please call back if any changes to your diet, medications or how you've been taking warfarin, Monitoring for bleeding signs and symptoms, Monitoring for clotting signs and symptoms and Importance of notifying clinic for changes in medications; a sooner lab recheck maybe needed.    Plan made per [facility] anticoagulation protocol    Lucretia Go RN  Anticoagulation Clinic  9/21/2022    _______________________________________________________________________     Anticoagulation Episode Summary     Current INR goal:  2.0-3.0   TTR:  82.0 % (1 y)   Target end date:  Indefinite   Send INR reminders to:  [facility]    Indications    Chronic atrial fibrillation (H) [I48.20]  Current use of long term anticoagulation [Z79.01]           Comments:           Anticoagulation Care Providers     Provider Role Specialty Phone number    Sharmaine Burks MD Referring Internal Medicine [phone]

## 2022-09-23 ENCOUNTER — LAB (OUTPATIENT)
Dept: LAB | Facility: CLINIC | Age: 82
End: 2022-09-23
Payer: COMMERCIAL

## 2022-09-23 DIAGNOSIS — I50.32 CHRONIC DIASTOLIC CONGESTIVE HEART FAILURE (H): ICD-10-CM

## 2022-09-23 DIAGNOSIS — I50.812 CHRONIC RIGHT-SIDED HEART FAILURE (H): ICD-10-CM

## 2022-09-27 ENCOUNTER — OFFICE VISIT (OUTPATIENT)
Dept: CARDIOLOGY | Facility: CLINIC | Age: 82
End: 2022-09-27

## 2022-09-27 ENCOUNTER — LAB (OUTPATIENT)
Dept: LAB | Facility: CLINIC | Age: 82
End: 2022-09-27
Payer: COMMERCIAL

## 2022-09-27 VITALS
WEIGHT: 161 LBS | HEIGHT: 64 IN | DIASTOLIC BLOOD PRESSURE: 72 MMHG | SYSTOLIC BLOOD PRESSURE: 118 MMHG | HEART RATE: 94 BPM | OXYGEN SATURATION: 97 % | BODY MASS INDEX: 27.49 KG/M2

## 2022-09-27 DIAGNOSIS — I50.812 CHRONIC RIGHT-SIDED HEART FAILURE (H): Primary | ICD-10-CM

## 2022-09-27 DIAGNOSIS — I50.32 CHRONIC DIASTOLIC HEART FAILURE (H): ICD-10-CM

## 2022-09-27 DIAGNOSIS — I50.32 CHRONIC DIASTOLIC HEART FAILURE (H): Primary | ICD-10-CM

## 2022-09-27 DIAGNOSIS — I50.32 CHRONIC DIASTOLIC CONGESTIVE HEART FAILURE (H): ICD-10-CM

## 2022-09-27 LAB
ANION GAP SERPL CALCULATED.3IONS-SCNC: 6 MMOL/L (ref 3–14)
BUN SERPL-MCNC: 29 MG/DL (ref 7–30)
CALCIUM SERPL-MCNC: 9.7 MG/DL (ref 8.5–10.1)
CHLORIDE BLD-SCNC: 101 MMOL/L (ref 94–109)
CO2 SERPL-SCNC: 30 MMOL/L (ref 20–32)
CREAT SERPL-MCNC: 1.21 MG/DL (ref 0.52–1.04)
GFR SERPL CREATININE-BSD FRML MDRD: 45 ML/MIN/1.73M2
GLUCOSE BLD-MCNC: 87 MG/DL (ref 70–99)
POTASSIUM BLD-SCNC: 4 MMOL/L (ref 3.4–5.3)
SODIUM SERPL-SCNC: 137 MMOL/L (ref 133–144)

## 2022-09-27 PROCEDURE — 80048 BASIC METABOLIC PNL TOTAL CA: CPT | Performed by: PHYSICIAN ASSISTANT

## 2022-09-27 PROCEDURE — 36415 COLL VENOUS BLD VENIPUNCTURE: CPT | Performed by: PHYSICIAN ASSISTANT

## 2022-09-27 PROCEDURE — 99214 OFFICE O/P EST MOD 30 MIN: CPT | Performed by: PHYSICIAN ASSISTANT

## 2022-09-27 NOTE — PATIENT INSTRUCTIONS
Call C[facility] nurse for any questions or concerns:  [phone]   *If you have concerns after hours, please call [phone], option 2 to speak with on call Cardiologist.    1. Medication changes and/or recommendations from today:  no medication changes today. If your weight is not coming down - I would take an extra 10 mg of Torsemide for a day or two    2. Follow up plan: See Xochitl back in 3 months    3. Weigh yourself daily and write it down.     4. Call C[facility] nurse if your weight is up more than 2 pounds in one day or 5 pounds in one week.     5. Call C[facility] nurse if you feel more short of breath, have more abdominal bloating, or leg swelling.     6. Continue low sodium diet (less than 2000 mg daily). If you eat less salt, you will retain less fluid.     7. Alcohol can weaken your heart further. You should avoid alcohol or limit its use to special times, such as a holiday or birthday.      8. Do NOT take Aleve or ibuprofen without talking to your doctor first.      9. Lab Results: we'll let you know your lab results.     [facility]: Cardiomyopathy, Optimization, Rehabilitation, Education  The [facility] is a heart failure specialty clinic within the [facility] where you will work with specialized nurse practitioners, physician assistants, doctors, and registered nurses. They are dedicated to helping patients with heart failure to carefully adjust medications, receive education, and learn who and when to call if symptoms develop. They specialize in helping you better understand your condition, slow the progression of your disease, improve the length and quality of your life, help you detect future heart problems before they become life threatening, and avoid hospitalizations.

## 2022-09-27 NOTE — LETTER
9/27/2022    Sharmaine Burks MD  [address] [facility] [place] [address]    RE: Priya Funez       Dear Colleague,     I had the pleasure of seeing Priya Funez in the [facility].  Cardiology Progress Note    Patient seen today in follow up of: [facility] follow up  Primary cardiologist: Dr. Kwok    HPI:  Priya Funez is a very pleasant 82 year old female with a history of heart failure with preserved LVEF and right heart failure in the setting of severe tricuspid regurgitation, atrial fibrillation status post AV node ablation with permanent pacemaker implantation in January 2019, emergent aortic dissection repair in January 2019, hypertension and obesity.    Priya has been following in the [facility] clinic since the summer of 2019, typically with my colleague TAMRA Fernando.  Her volume status has been managed with Torsemide and she has been treated in the lymphedema clinic as well.    Her last echocardiogram was done in June 2022 which showed normal LV function.  Her RV was moderate to severely dilated with mildly decreased RV systolic function.  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  Her IVC was dilated.  In comparison to her prior echo from the year before, her RV was more dilated and she had worsening tricuspid regurgitation.    Had a follow-up visit at the end of June, she was started on spironolactone with some improvement.  SGLT2 inhibitors were discussed however were expensive and she has been resistant to further medication adjustments.  She was eating a diet high in sodium and applying for meals through [employer] was recommended.    She was last seen in clinic by Ms. Eden on 7/26/2022.  She was taking torsemide 30 mg daily with an extra 10 to 20 mg as needed in addition to spironolactone 25 mg daily.  At her last visit it was felt she was likely still hypervolemic however Lavonne was resistant to further medication adjustments due to  frequent urination.  They talked about looking into the cost of Entresto.  She was receiving meals through [facility] and felt this to be a big relief as she was not having to worry about her sodium intake like she was before.  Ms. Eden also talked about potentially considering a cardiac MRI to further evaluate her RV and TR after optimizing her medications and volume status.    Priya is here today for follow up.  She overall has been feeling good unless she tries to do too much.  She was up and down at [employer] the other day and was very tired afterward.  It was her birthday yesterday.  To celebrate, she went out with her family.  She had onion rings and hamburgers.  Today her weight is up 3 pounds.  She has not taken extra torsemide but plans to get back to her usual low-sodium diet and ensure her weight will trend down.  She denies any increasing dyspnea on exertion.  No peripheral edema or abdominal bloating today.  She has not taken extra torsemide at all in the last few months.    ASSESSMENT/PLAN:  Lavonne Funez is an absolutely delightful 82-year-old female with a history of severe tricuspid regurgitation and right-sided heart failure, atrial fibrillation status post AV node ablation and pacemaker implantation in January 2019, aortic dissection repair in January 2019 with a prolonged and complex course following that, hypertension and obesity who is here today for follow-up.    Lavonne at this point appears to be stable from a cardiac perspective.  Her home weight is up a few pounds after some dietary indiscretion while celebrating her birthday yesterday.  If her weight is not trending down, I asked her to take an extra 10 mg of torsemide for the next day or so but otherwise she will get back to her usual low-sodium diet.  Otherwise, we will continue 30 mg of torsemide daily with her spironolactone 25 mg daily.  Labs today showed stable renal function with a creatinine of 1.2 and normal electrolytes.   Her blood pressure is under good control today.    We briefly reviewed prior discussions of starting an SGLT2 inhibitor or Entresto.  SGLT2 inhibitors were cost prohibitive. Entresto may be affordable however her first month would be expensive. For now, she would prefer to defer that.    We did spend some time talking about her echocardiogram and tricuspid regurgitation. A cardiac MRI has been mentioned to further evaluate her TR and RV function. For now, we agreed to continue with medical management and discuss in follow up.    It was a pleasure meeting Priya in clinic today. She will return for CORE follow up in three months. I asked her to contact us sooner however with any questions or concerns.    Orders this Visit:  Orders Placed This Encounter   Procedures     Basic metabolic panel     Follow-Up with Cardiology C[facility] (Self)     No orders of the defined types were placed in this encounter.    There are no discontinued medications.    CURRENT MEDICATIONS:  Current Outpatient Medications   Medication Sig Dispense Refill     Acetaminophen 325 MG CAPS Take 325-650 mg by mouth every 4 hours as needed       albuterol (PROAIR HFA/PROVENTIL HFA/VENTOLIN HFA) 108 (90 Base) MCG/ACT inhaler Inhale 2 puffs into the lungs every 6 hours as needed for shortness of breath / dyspnea or wheezing 18 g 0     aspirin (ASA) 81 MG EC tablet Take 81 mg by mouth every 24 hours       cholecalciferol 25 MCG (1000 UT) TABS Take 1,000 Units by mouth daily        cyanocobalamin (VITAMIN B-12) 100 MCG tablet Take 1,000 mcg by mouth daily        escitalopram (LEXAPRO) 10 MG tablet Take 1 tablet (10 mg) by mouth daily 90 tablet 3     metoprolol tartrate (LOPRESSOR) 25 MG tablet Take 1 tablet (25 mg) by mouth 2 times daily 180 tablet 3     mirtazapine (REMERON) 15 MG tablet Take 1 tablet (15 mg) by mouth At Bedtime 90 tablet 1     spironolactone (ALDACTONE) 25 MG tablet Take 1 tablet (25 mg) by mouth daily 90 tablet 3     torsemide (DEMADEX)  10 MG tablet Take 3 tablets (30 mg) by mouth daily 220 tablet 3     warfarin ANTICOAGULANT (COUMADIN) 2 MG tablet Current dose: 4 mg (2 tablets) daily or as instructed by ACC team. 185 tablet 1     ALLERGIES  Allergies   Allergen Reactions     Amoxicillin Swelling     Face and body     Ciprofloxacin Hives     PAST MEDICAL HISTORY:  Past Medical History:   Diagnosis Date     Benign essential hypertension      Chronic atrial fibrillation (H)     s/p AV node ablation January, 2019     Chronic diastolic heart failure (H)      Chronic kidney disease, stage III (moderate) (H)      PAST SURGICAL HISTORY:  Past Surgical History:   Procedure Laterality Date     ANGIOGRAM Right 01/04/2019    Procedure: DIAGONSTIC ANGIOGRAM OF SMA;  Surgeon: Rigo Jennings MD;  Location: [facility] OR     BIOPSY BREAST       BYPASS GRAFT ARTERY CORONARY, REPAIR VALVE AORTIC, COMBINED N/A 01/04/2019    Procedure: AORTIC DISSECTION REPAIR WITH GRAFT- HEMASHIELD PLATINUM WOVEN DOUBLE VELOR 4 SIDE ARM VASCULAR GRAFT, D:35 X 12 X 10 X 10MM/ L:50CM;  Surgeon: Jose Winslow MD;  Location:  OR     EP ABLATION AV NODE N/A 01/11/2019    Procedure: EP Ablation AV Node;  Surgeon: Tsering Davey MD;  Location:  HEART CARDIAC CATH LAB     EP PACEMAKER Left 01/11/2019    Procedure: EP Pacemaker;  Surgeon: Tsering Davey MD;  Location:  HEART CARDIAC CATH LAB     THYROID SURGERY      benign mass removed     TRACHEOSTOMY N/A 01/25/2019    Procedure: TRACHEOSTOMY  (DR MCCLELLAND);  Surgeon: Bryson Mcclelland MD;  Location:  OR     TUBAL LIGATION       FAMILY HISTORY:  Family History   Problem Relation Age of Onset     Diabetes No family hx of      Myocardial Infarction No family hx of      Cerebrovascular Disease No family hx of      Coronary Artery Disease Early Onset No family hx of      Breast Cancer No family hx of      Colon Cancer No family hx of      Ovarian Cancer No family hx of      Ataxia Mother      Heart Disease Father      SOCIAL  "HISTORY:  Social History     Socioeconomic History     Marital status:    Occupational History     Occupation: Retired - customer service   Tobacco Use     Smoking status: Never Smoker     Smokeless tobacco: Never Used   Substance and Sexual Activity     Alcohol use: Not Currently     Drug use: No   Social History Narrative    .    Lives in home with . Fully independent.    4 adult children.    8 grandchildren.     Physical Exam:  Vitals: /72   Pulse 94   Ht 1.613 m (5' 3.5\")   Wt 73 kg (161 lb)   SpO2 97%   BMI 28.07 kg/m     Wt Readings from Last 4 Encounters:   09/27/22 73 kg (161 lb)   07/26/22 66.7 kg (147 lb)   06/30/22 72.1 kg (158 lb 14.4 oz)   06/13/22 72.8 kg (160 lb 9.6 oz)     GEN: well nourished, in no acute distress.  HEENT:  Pupils equal, round. Sclerae nonicteric.   C/V:  Regular rate and rhythm, + Systolic murmur  RESP: Respirations are unlabored. Clear to auscultation bilaterally without wheezing, rales, or rhonchi.  GI: Abdomen soft, nontender.  EXTREM: no LE edema.  NEURO: Alert and oriented, cooperative.  SKIN: Warm and dry.     Recent Lab Results:  LIPID RESULTS:  Lab Results   Component Value Date    CHOL 134 06/14/2021    HDL 53 06/14/2021    LDL 67 06/14/2021    TRIG 71 06/14/2021     LIVER ENZYME RESULTS:  Lab Results   Component Value Date    AST 27 04/04/2019    ALT 22 06/14/2021     CBC RESULTS:  Lab Results   Component Value Date    WBC 5.3 06/14/2021    RBC 4.33 06/14/2021    HGB 12.6 03/11/2022    HGB 12.8 06/14/2021    HCT 40.1 06/14/2021    MCV 93 06/14/2021    MCH 29.6 06/14/2021    MCHC 31.9 06/14/2021    RDW 13.5 06/14/2021     06/14/2021     BMP RESULTS:  Lab Results   Component Value Date     09/27/2022     06/23/2021    POTASSIUM 4.0 09/27/2022    POTASSIUM 4.5 06/23/2021    CHLORIDE 101 09/27/2022    CHLORIDE 104 06/23/2021    CO2 30 09/27/2022    CO2 31 06/23/2021    ANIONGAP 6 09/27/2022    ANIONGAP 3 06/23/2021    GLC 87 " 09/27/2022    GLC 86 06/23/2021    BUN 29 09/27/2022    BUN 30 06/23/2021    CR 1.21 (H) 09/27/2022    CR 1.29 (H) 06/23/2021    GFRESTIMATED 45 (L) 09/27/2022    GFRESTIMATED 39 (L) 06/23/2021    GFRESTBLACK 46 (L) 06/23/2021    BESS 9.7 09/27/2022    BESS 9.2 06/23/2021      A1C RESULTS:  Lab Results   Component Value Date    A1C 5.6 01/30/2019    A1C 5.3 01/04/2019     INR RESULTS:  Lab Results   Component Value Date    INR 2.9 (H) 09/21/2022    INR 3.0 (H) 09/01/2022    INR 2.43 (H) 03/11/2022    INR 2.30 (H) 07/09/2021    INR 2.40 (H) 06/10/2021       Flaquita Orr PA-C  [facility]    Thank you for allowing me to participate in the care of your patient.      Sincerely,     Flaquita Orr PA-C     [facility]  cc:   Xochitl Eden, APRN CNP  [address]  [place] [id number]

## 2022-09-27 NOTE — PROGRESS NOTES
Cardiology Progress Note    Patient seen today in follow up of: CORE follow up  Primary cardiologist: Dr. [email or web address]    HPI:  Priya Funez is a very pleasant 82 year old female with a history of heart failure with preserved LVEF and right heart failure in the setting of severe tricuspid regurgitation, atrial fibrillation status post AV node ablation with permanent pacemaker implantation in January 2019, emergent aortic dissection repair in January 2019, hypertension and obesity.    Priya has been following in the CORE clinic since the summer of 2019, typically with my colleague TAMRA Fernando.  Her volume status has been managed with Torsemide and she has been treated in the lymphedema clinic as well.    Her last echocardiogram was done in June 2022 which showed normal LV function.  Her RV was moderate to severely dilated with mildly decreased RV systolic function.  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  Her IVC was dilated.  In comparison to her prior echo from the year before, her RV was more dilated and she had worsening tricuspid regurgitation.    Had a follow-up visit at the end of June, she was started on spironolactone with some improvement.  SGLT2 inhibitors were discussed however were expensive and she has been resistant to further medication adjustments.  She was eating a diet high in sodium and applying for meals through [facility] [employer] was recommended.    She was last seen in clinic by Ms. Eden on 7/26/2022.  She was taking torsemide 30 mg daily with an extra 10 to 20 mg as needed in addition to spironolactone 25 mg daily.  At her last visit it was felt she was likely still hypervolemic however Lavonne was resistant to further medication adjustments due to frequent urination.  They talked about looking into the cost of Entresto.  She was receiving meals through [facility] and felt this to be a big relief as she was not having to worry about her sodium intake like she  was before.  Ms. Eden also talked about potentially considering a cardiac MRI to further evaluate her RV and TR after optimizing her medications and volume status.    Priya is here today for follow up.  She overall has been feeling good unless she tries to do too much.  She was up and down at [employer] the other day and was very tired afterward.  It was her birthday yesterday.  To celebrate, she went out with her family.  She had onion rings and hamburgers.  Today her weight is up 3 pounds.  She has not taken extra torsemide but plans to get back to her usual low-sodium diet and ensure her weight will trend down.  She denies any increasing dyspnea on exertion.  No peripheral edema or abdominal bloating today.  She has not taken extra torsemide at all in the last few months.    ASSESSMENT/PLAN:  Lavonne Funez is an absolutely delightful 82-year-old female with a history of severe tricuspid regurgitation and right-sided heart failure, atrial fibrillation status post AV node ablation and pacemaker implantation in January 2019, aortic dissection repair in January 2019 with a prolonged and complex course following that, hypertension and obesity who is here today for follow-up.    Lavonne at this point appears to be stable from a cardiac perspective.  Her home weight is up a few pounds after some dietary indiscretion while celebrating her birthday yesterday.  If her weight is not trending down, I asked her to take an extra 10 mg of torsemide for the next day or so but otherwise she will get back to her usual low-sodium diet.  Otherwise, we will continue 30 mg of torsemide daily with her spironolactone 25 mg daily.  Labs today showed stable renal function with a creatinine of 1.2 and normal electrolytes.  Her blood pressure is under good control today.    We briefly reviewed prior discussions of starting an SGLT2 inhibitor or Entresto.  SGLT2 inhibitors were cost prohibitive. Entresto may be affordable however her  first month would be expensive. For now, she would prefer to defer that.    We did spend some time talking about her echocardiogram and tricuspid regurgitation. A cardiac MRI has been mentioned to further evaluate her TR and RV function. For now, we agreed to continue with medical management and discuss in follow up.    It was a pleasure meeting Priya in clinic today. She will return for CORE follow up in three months. I asked her to contact us sooner however with any questions or concerns.    Orders this Visit:  Orders Placed This Encounter   Procedures     Basic metabolic panel     Follow-Up with Cardiology CORE (Self)     No orders of the defined types were placed in this encounter.    There are no discontinued medications.    CURRENT MEDICATIONS:  Current Outpatient Medications   Medication Sig Dispense Refill     Acetaminophen 325 MG CAPS Take 325-650 mg by mouth every 4 hours as needed       albuterol (PROAIR HFA/PROVENTIL HFA/VENTOLIN HFA) 108 (90 Base) MCG/ACT inhaler Inhale 2 puffs into the lungs every 6 hours as needed for shortness of breath / dyspnea or wheezing 18 g 0     aspirin (ASA) 81 MG EC tablet Take 81 mg by mouth every 24 hours       cholecalciferol 25 MCG (1000 UT) TABS Take 1,000 Units by mouth daily        cyanocobalamin (VITAMIN B-12) 100 MCG tablet Take 1,000 mcg by mouth daily        escitalopram (LEXAPRO) 10 MG tablet Take 1 tablet (10 mg) by mouth daily 90 tablet 3     metoprolol tartrate (LOPRESSOR) 25 MG tablet Take 1 tablet (25 mg) by mouth 2 times daily 180 tablet 3     mirtazapine (REMERON) 15 MG tablet Take 1 tablet (15 mg) by mouth At Bedtime 90 tablet 1     spironolactone (ALDACTONE) 25 MG tablet Take 1 tablet (25 mg) by mouth daily 90 tablet 3     torsemide (DEMADEX) 10 MG tablet Take 3 tablets (30 mg) by mouth daily 220 tablet 3     warfarin ANTICOAGULANT (COUMADIN) 2 MG tablet Current dose: 4 mg (2 tablets) daily or as instructed by ACC team. 185 tablet 1      ALLERGIES  Allergies   Allergen Reactions     Amoxicillin Swelling     Face and body     Ciprofloxacin Hives     PAST MEDICAL HISTORY:  Past Medical History:   Diagnosis Date     Benign essential hypertension      Chronic atrial fibrillation (H)     s/p AV node ablation January, 2019     Chronic diastolic heart failure (H)      Chronic kidney disease, stage III (moderate) (H)      PAST SURGICAL HISTORY:  Past Surgical History:   Procedure Laterality Date     ANGIOGRAM Right 01/04/2019    Procedure: DIAGONSTIC ANGIOGRAM OF SMA;  Surgeon: Rigo Jennings MD;  Location:  OR     BIOPSY BREAST       BYPASS GRAFT ARTERY CORONARY, REPAIR VALVE AORTIC, COMBINED N/A 01/04/2019    Procedure: AORTIC DISSECTION REPAIR WITH GRAFT- HEMASHIELD PLATINUM WOVEN DOUBLE VELOR 4 SIDE ARM VASCULAR GRAFT, D:35 X 12 X 10 X 10MM/ L:50CM;  Surgeon: Jose Winslow MD;  Location:  OR     EP ABLATION AV NODE N/A 01/11/2019    Procedure: EP Ablation AV Node;  Surgeon: Tsering Davey MD;  Location:  [facility]ATH [facility]     EP PACEMAKER Left 01/11/2019    Procedure: EP Pacemaker;  Surgeon: Tsering Davey MD;  Location:  [facility] CATH LAB     THYROID SURGERY      benign mass removed     TRACHEOSTOMY N/A 01/25/2019    Procedure: TRACHEOSTOMY  (DR MCCLELLAND);  Surgeon: Bryson Mcclelland MD;  Location:  OR     TUBAL LIGATION       FAMILY HISTORY:  Family History   Problem Relation Age of Onset     Diabetes No family hx of      Myocardial Infarction No family hx of      Cerebrovascular Disease No family hx of      Coronary Artery Disease Early Onset No family hx of      Breast Cancer No family hx of      Colon Cancer No family hx of      Ovarian Cancer No family hx of      Ataxia Mother      Heart Disease Father      SOCIAL HISTORY:  Social History     Socioeconomic History     Marital status:    Occupational History     Occupation: Retired - customer service   Tobacco Use     Smoking status: Never Smoker      "Smokeless tobacco: Never Used   Substance and Sexual Activity     Alcohol use: Not Currently     Drug use: No   Social History Narrative    .    Lives in home with . Fully independent.    4 adult children.    8 grandchildren.     Physical Exam:  Vitals: /72   Pulse 94   Ht 1.613 m (5' 3.5\")   Wt 73 kg (161 lb)   SpO2 97%   BMI 28.07 kg/m     Wt Readings from Last 4 Encounters:   09/27/22 73 kg (161 lb)   07/26/22 66.7 kg (147 lb)   06/30/22 72.1 kg (158 lb 14.4 oz)   06/13/22 72.8 kg (160 lb 9.6 oz)     GEN: well nourished, in no acute distress.  HEENT:  Pupils equal, round. Sclerae nonicteric.   C/V:  Regular rate and rhythm, + Systolic murmur  RESP: Respirations are unlabored. Clear to auscultation bilaterally without wheezing, rales, or rhonchi.  GI: Abdomen soft, nontender.  EXTREM: no LE edema.  NEURO: Alert and oriented, cooperative.  SKIN: Warm and dry.     Recent Lab Results:  LIPID RESULTS:  Lab Results   Component Value Date    CHOL 134 06/14/2021    HDL 53 06/14/2021    LDL 67 06/14/2021    TRIG 71 06/14/2021     LIVER ENZYME RESULTS:  Lab Results   Component Value Date    AST 27 04/04/2019    ALT 22 06/14/2021     CBC RESULTS:  Lab Results   Component Value Date    WBC 5.3 06/14/2021    RBC 4.33 06/14/2021    HGB 12.6 03/11/2022    HGB 12.8 06/14/2021    HCT 40.1 06/14/2021    MCV 93 06/14/2021    MCH 29.6 06/14/2021    MCHC 31.9 06/14/2021    RDW 13.5 06/14/2021     06/14/2021     BMP RESULTS:  Lab Results   Component Value Date     09/27/2022     06/23/2021    POTASSIUM 4.0 09/27/2022    POTASSIUM 4.5 06/23/2021    CHLORIDE 101 09/27/2022    CHLORIDE 104 06/23/2021    CO2 30 09/27/2022    CO2 31 06/23/2021    ANIONGAP 6 09/27/2022    ANIONGAP 3 06/23/2021    GLC 87 09/27/2022    GLC 86 06/23/2021    BUN 29 09/27/2022    BUN 30 06/23/2021    CR 1.21 (H) 09/27/2022    CR 1.29 (H) 06/23/2021    GFRESTIMATED 45 (L) 09/27/2022    GFRESTIMATED 39 (L) 06/23/2021    " GFRESTBLACK 46 (L) 06/23/2021    BESS 9.7 09/27/2022    BESS 9.2 06/23/2021      A1C RESULTS:  Lab Results   Component Value Date    A1C 5.6 01/30/2019    A1C 5.3 01/04/2019     INR RESULTS:  Lab Results   Component Value Date    INR 2.9 (H) 09/21/2022    INR 3.0 (H) 09/01/2022    INR 2.43 (H) 03/11/2022    INR 2.30 (H) 07/09/2021    INR 2.40 (H) 06/10/2021       Flaquita Orr PA-C  [facility]

## 2022-09-30 ENCOUNTER — ANCILLARY PROCEDURE (OUTPATIENT)
Dept: CARDIOLOGY | Facility: CLINIC | Age: 82
End: 2022-09-30
Attending: INTERNAL MEDICINE
Payer: COMMERCIAL

## 2022-09-30 DIAGNOSIS — Z95.0 CARDIAC PACEMAKER IN SITU: ICD-10-CM

## 2022-09-30 DIAGNOSIS — I49.5 SICK SINUS SYNDROME (H): ICD-10-CM

## 2022-09-30 PROCEDURE — 93296 REM INTERROG EVL PM/IDS: CPT | Performed by: INTERNAL MEDICINE

## 2022-09-30 PROCEDURE — 93294 REM INTERROG EVL PM/LDLS PM: CPT | Performed by: INTERNAL MEDICINE

## 2022-10-03 DIAGNOSIS — F41.1 GAD (GENERALIZED ANXIETY DISORDER): ICD-10-CM

## 2022-10-05 RX ORDER — ESCITALOPRAM OXALATE 10 MG/1
10 TABLET ORAL DAILY
Qty: 90 TABLET | Refills: 0 | Status: SHIPPED | OUTPATIENT
Start: 2022-10-05 | End: 2022-12-20

## 2022-10-06 LAB
MDC_IDC_LEAD_IMPLANT_DT: NORMAL
MDC_IDC_LEAD_LOCATION: NORMAL
MDC_IDC_LEAD_LOCATION_DETAIL_1: NORMAL
MDC_IDC_LEAD_MFG: NORMAL
MDC_IDC_LEAD_MODEL: NORMAL
MDC_IDC_LEAD_POLARITY_TYPE: NORMAL
MDC_IDC_LEAD_SERIAL: NORMAL
MDC_IDC_MSMT_BATTERY_DTM: NORMAL
MDC_IDC_MSMT_BATTERY_REMAINING_LONGEVITY: 116 MO
MDC_IDC_MSMT_BATTERY_RRT_TRIGGER: 2.62
MDC_IDC_MSMT_BATTERY_STATUS: NORMAL
MDC_IDC_MSMT_BATTERY_VOLTAGE: 2.94 V
MDC_IDC_MSMT_LEADCHNL_RV_IMPEDANCE_VALUE: 380 OHM
MDC_IDC_MSMT_LEADCHNL_RV_IMPEDANCE_VALUE: 437 OHM
MDC_IDC_MSMT_LEADCHNL_RV_PACING_THRESHOLD_AMPLITUDE: 0.5 V
MDC_IDC_MSMT_LEADCHNL_RV_PACING_THRESHOLD_PULSEWIDTH: 0.4 MS
MDC_IDC_MSMT_LEADCHNL_RV_SENSING_INTR_AMPL: 13.12 MV
MDC_IDC_MSMT_LEADCHNL_RV_SENSING_INTR_AMPL: 13.12 MV
MDC_IDC_PG_IMPLANT_DTM: NORMAL
MDC_IDC_PG_MFG: NORMAL
MDC_IDC_PG_MODEL: NORMAL
MDC_IDC_PG_SERIAL: NORMAL
MDC_IDC_PG_TYPE: NORMAL
MDC_IDC_SESS_CLINIC_NAME: NORMAL
MDC_IDC_SESS_DTM: NORMAL
MDC_IDC_SESS_TYPE: NORMAL
MDC_IDC_SET_BRADY_HYSTRATE: NORMAL
MDC_IDC_SET_BRADY_LOWRATE: 60 {BEATS}/MIN
MDC_IDC_SET_BRADY_MAX_SENSOR_RATE: 120 {BEATS}/MIN
MDC_IDC_SET_BRADY_MODE: NORMAL
MDC_IDC_SET_LEADCHNL_RV_PACING_AMPLITUDE: 2 V
MDC_IDC_SET_LEADCHNL_RV_PACING_ANODE_ELECTRODE_1: NORMAL
MDC_IDC_SET_LEADCHNL_RV_PACING_ANODE_LOCATION_1: NORMAL
MDC_IDC_SET_LEADCHNL_RV_PACING_CAPTURE_MODE: NORMAL
MDC_IDC_SET_LEADCHNL_RV_PACING_CATHODE_ELECTRODE_1: NORMAL
MDC_IDC_SET_LEADCHNL_RV_PACING_CATHODE_LOCATION_1: NORMAL
MDC_IDC_SET_LEADCHNL_RV_PACING_POLARITY: NORMAL
MDC_IDC_SET_LEADCHNL_RV_PACING_PULSEWIDTH: 0.4 MS
MDC_IDC_SET_LEADCHNL_RV_SENSING_ANODE_ELECTRODE_1: NORMAL
MDC_IDC_SET_LEADCHNL_RV_SENSING_ANODE_LOCATION_1: NORMAL
MDC_IDC_SET_LEADCHNL_RV_SENSING_CATHODE_ELECTRODE_1: NORMAL
MDC_IDC_SET_LEADCHNL_RV_SENSING_CATHODE_LOCATION_1: NORMAL
MDC_IDC_SET_LEADCHNL_RV_SENSING_POLARITY: NORMAL
MDC_IDC_SET_LEADCHNL_RV_SENSING_SENSITIVITY: 0.9 MV
MDC_IDC_SET_ZONE_DETECTION_INTERVAL: 360 MS
MDC_IDC_SET_ZONE_TYPE: NORMAL
MDC_IDC_STAT_BRADY_DTM_END: NORMAL
MDC_IDC_STAT_BRADY_DTM_START: NORMAL
MDC_IDC_STAT_BRADY_RV_PERCENT_PACED: 99.99 %
MDC_IDC_STAT_EPISODE_RECENT_COUNT: 0
MDC_IDC_STAT_EPISODE_RECENT_COUNT_DTM_END: NORMAL
MDC_IDC_STAT_EPISODE_RECENT_COUNT_DTM_START: NORMAL
MDC_IDC_STAT_EPISODE_TOTAL_COUNT: 0
MDC_IDC_STAT_EPISODE_TOTAL_COUNT_DTM_END: NORMAL
MDC_IDC_STAT_EPISODE_TOTAL_COUNT_DTM_START: NORMAL
MDC_IDC_STAT_EPISODE_TYPE: NORMAL

## 2022-10-14 ENCOUNTER — ANTICOAGULATION THERAPY VISIT (OUTPATIENT)
Dept: ANTICOAGULATION | Facility: CLINIC | Age: 82
End: 2022-10-14

## 2022-10-14 ENCOUNTER — LAB (OUTPATIENT)
Dept: LAB | Facility: CLINIC | Age: 82
End: 2022-10-14
Payer: COMMERCIAL

## 2022-10-14 DIAGNOSIS — I48.20 CHRONIC ATRIAL FIBRILLATION (H): Primary | ICD-10-CM

## 2022-10-14 DIAGNOSIS — I48.20 CHRONIC ATRIAL FIBRILLATION (H): ICD-10-CM

## 2022-10-14 DIAGNOSIS — Z79.01 CURRENT USE OF LONG TERM ANTICOAGULATION: ICD-10-CM

## 2022-10-14 LAB — INR BLD: 3.3 (ref 0.9–1.1)

## 2022-10-14 PROCEDURE — 85610 PROTHROMBIN TIME: CPT

## 2022-10-14 PROCEDURE — 36416 COLLJ CAPILLARY BLOOD SPEC: CPT

## 2022-10-14 NOTE — PROGRESS NOTES
ANTICOAGULATION MANAGEMENT     Priya MARISOL Funez 82 year old female is on warfarin with supratherapeutic INR result. (Goal INR 2.0-3.0)    Recent labs: (last 7 days)     10/14/22  1246   INR 3.3*       ASSESSMENT       Source(s): Chart Review and Patient/Caregiver Call       Warfarin doses taken: Warfarin taken as instructed    Diet: No new diet changes identified   Would like to cut back on greens alittle    New illness, injury, or hospitalization: No    Medication/supplement changes: None noted    Signs or symptoms of bleeding or clotting: Yes: lots of bruising with covid booster    Previous INR: Therapeutic last 2(+) visits    Additional findings: None       PLAN     Recommended plan for no diet, medication or health factor changes affecting INR     Dosing Instructions: decrease your warfarin dose (7% change) with next INR in 2 weeks       Summary  As of 10/14/2022    Full warfarin instructions:  2 mg every Fri; 4 mg all other days   Next INR check:  10/28/2022             Telephone call with Priya who verbalizes understanding and agrees to plan    Lab visit scheduled    Education provided: Please call back if any changes to your diet, medications or how you've been taking warfarin    Plan made per [facility] anticoagulation protocol    Christine Adames RN  Anticoagulation Clinic  10/14/2022    _______________________________________________________________________     Anticoagulation Episode Summary     Current INR goal:  2.0-3.0   TTR:  77.2 % (1 y)   Target end date:  Indefinite   Send INR reminders to:  [facility]    Indications    Chronic atrial fibrillation (H) [I48.20]  Current use of long term anticoagulation [Z79.01]           Comments:           Anticoagulation Care Providers     Provider Role Specialty Phone number    Sharmaine Burks MD Referring Internal Medicine [phone]           Skyrizi Counseling: I discussed with the patient the risks of risankizumab-rzaa including but not limited to immunosuppression, and serious infections.  The patient understands that monitoring is required including a PPD at baseline and must alert us or the primary physician if symptoms of infection or other concerning signs are noted.

## 2022-10-17 ENCOUNTER — TELEPHONE (OUTPATIENT)
Dept: INTERNAL MEDICINE | Facility: CLINIC | Age: 82
End: 2022-10-17

## 2022-10-17 DIAGNOSIS — I48.20 CHRONIC ATRIAL FIBRILLATION (H): ICD-10-CM

## 2022-10-17 RX ORDER — WARFARIN SODIUM 2 MG/1
TABLET ORAL
Qty: 185 TABLET | Refills: 0 | Status: SHIPPED | OUTPATIENT
Start: 2022-10-17 | End: 2023-01-01

## 2022-10-17 NOTE — TELEPHONE ENCOUNTER
Reason for Call:  Other call back    Detailed comments: Patient has questions about their medication and dosing.     Phone Number Patient can be reached at: Home number on file [phone] (home) or Cell number on file:    Telephone Information:   Mobile [phone]       Best Time: n/a    Can we leave a detailed message on this number? YES    Call taken on 10/17/2022 at 1:02 PM by Glenroy Hernandez

## 2022-10-27 ENCOUNTER — ANCILLARY PROCEDURE (OUTPATIENT)
Dept: MAMMOGRAPHY | Facility: CLINIC | Age: 82
End: 2022-10-27
Attending: INTERNAL MEDICINE
Payer: COMMERCIAL

## 2022-10-27 DIAGNOSIS — Z12.31 VISIT FOR SCREENING MAMMOGRAM: ICD-10-CM

## 2022-10-27 PROCEDURE — 77067 SCR MAMMO BI INCL CAD: CPT | Mod: TC | Performed by: RADIOLOGY

## 2022-10-27 PROCEDURE — 77063 BREAST TOMOSYNTHESIS BI: CPT | Mod: TC | Performed by: RADIOLOGY

## 2022-10-28 ENCOUNTER — TELEPHONE (OUTPATIENT)
Dept: ANTICOAGULATION | Facility: CLINIC | Age: 82
End: 2022-10-28

## 2022-10-28 ENCOUNTER — LAB (OUTPATIENT)
Dept: LAB | Facility: CLINIC | Age: 82
End: 2022-10-28
Payer: COMMERCIAL

## 2022-10-28 ENCOUNTER — ANTICOAGULATION THERAPY VISIT (OUTPATIENT)
Dept: ANTICOAGULATION | Facility: CLINIC | Age: 82
End: 2022-10-28

## 2022-10-28 DIAGNOSIS — I48.20 CHRONIC ATRIAL FIBRILLATION (H): Primary | ICD-10-CM

## 2022-10-28 DIAGNOSIS — I48.20 CHRONIC ATRIAL FIBRILLATION (H): ICD-10-CM

## 2022-10-28 DIAGNOSIS — Z79.01 CURRENT USE OF LONG TERM ANTICOAGULATION: ICD-10-CM

## 2022-10-28 LAB — INR BLD: 2.8 (ref 0.9–1.1)

## 2022-10-28 PROCEDURE — 85610 PROTHROMBIN TIME: CPT

## 2022-10-28 PROCEDURE — 36416 COLLJ CAPILLARY BLOOD SPEC: CPT

## 2022-10-28 NOTE — TELEPHONE ENCOUNTER
ANTICOAGULATION CLINIC REFERRAL RENEWAL REQUEST       An annual renewal order is required for all patients referred to [facility].?  Please review and sign the pended referral order for Priya Funez.       ANTICOAGULATION SUMMARY      Warfarin indication(s)   Atrial Fibrillation    Mechanical heart valve present?  NO       Current goal range   INR: 2.0-3.0     Goal appropriate for indication? Goal INR 2-3, standard for indication(s) above     Time in Therapeutic Range (TTR)  (Goal > 60%) 77.2%       Office visit with referring provider's group within last year yes on 4/4/22       Génesis Mishra RN  [facility]

## 2022-10-28 NOTE — PROGRESS NOTES
ANTICOAGULATION MANAGEMENT     Priya Funez 82 year old female is on warfarin with therapeutic INR result. (Goal INR 2.0-3.0)    Recent labs: (last 7 days)     10/28/22  1432   INR 2.8*       ASSESSMENT       Source(s): Chart Review and Patient/Caregiver Call       Warfarin doses taken: Warfarin taken as instructed    Diet: No new diet changes identified    New illness, injury, or hospitalization: No    Medication/supplement changes: None noted    Signs or symptoms of bleeding or clotting: No    Previous INR: Supratherapeutic    Additional findings: None       PLAN     Recommended plan for no diet, medication or health factor changes affecting INR     Dosing Instructions: Continue your current warfarin dose with next INR in 3 weeks       Summary  As of 10/28/2022    Full warfarin instructions:  2 mg every Fri; 4 mg all other days; Starting 10/28/2022   Next INR check:  11/18/2022             Telephone call with Priya who verbalizes understanding and agrees to plan    Lab visit scheduled    Education provided:     Please call back if any changes to your diet, medications or how you've been taking warfarin    Contact [phone]  with any changes, questions or concerns.     Plan made per [facility] anticoagulation protocol    Génesis REYES RN  Anticoagulation Clinic  10/28/2022    _______________________________________________________________________     Anticoagulation Episode Summary     Current INR goal:  2.0-3.0   TTR:  76.5 % (1 y)   Target end date:  Indefinite   Send INR reminders to:  [facility]    Indications    Chronic atrial fibrillation (H) [I48.20]  Current use of long term anticoagulation [Z79.01]           Comments:           Anticoagulation Care Providers     Provider Role Specialty Phone number    Sharmaine Burks MD Referring Internal Medicine [phone]        ANTICOAGULATION MANAGEMENT     Priya Funez 82 year old female is on warfarin with therapeutic INR result. (Goal INR  2.0-3.0)    Recent labs: (last 7 days)     10/28/22  1432   INR 2.8*       ASSESSMENT       Source(s): Chart Review    Previous INR was Supratherapeutic    Medication, diet, health changes since last INR chart reviewed; none identified           PLAN     Unable to reach Priya today.    Spouse will have the patient call back when she gets home.    Follow up required to confirm warfarin dose taken and assess for changes    Génesis REYES RN  Anticoagulation Clinic  10/28/2022

## 2022-11-18 ENCOUNTER — LAB (OUTPATIENT)
Dept: LAB | Facility: CLINIC | Age: 82
End: 2022-11-18
Payer: COMMERCIAL

## 2022-11-18 ENCOUNTER — ANTICOAGULATION THERAPY VISIT (OUTPATIENT)
Dept: ANTICOAGULATION | Facility: CLINIC | Age: 82
End: 2022-11-18

## 2022-11-18 DIAGNOSIS — I48.20 CHRONIC ATRIAL FIBRILLATION (H): Primary | ICD-10-CM

## 2022-11-18 DIAGNOSIS — I48.20 CHRONIC ATRIAL FIBRILLATION (H): ICD-10-CM

## 2022-11-18 DIAGNOSIS — Z79.01 CURRENT USE OF LONG TERM ANTICOAGULATION: ICD-10-CM

## 2022-11-18 LAB — INR BLD: 2.8 (ref 0.9–1.1)

## 2022-11-18 PROCEDURE — 36416 COLLJ CAPILLARY BLOOD SPEC: CPT

## 2022-11-18 PROCEDURE — 85610 PROTHROMBIN TIME: CPT

## 2022-11-18 NOTE — PROGRESS NOTES
ANTICOAGULATION MANAGEMENT     Priya Funez 82 year old female is on warfarin with therapeutic INR result. (Goal INR 2.0-3.0)    Recent labs: (last 7 days)     11/18/22  1432   INR 2.8*       ASSESSMENT       Source(s): Chart Review and Patient/Caregiver Call       Warfarin doses taken: Warfarin taken as instructed    Diet: No new diet changes identified    New illness, injury, or hospitalization: No    Medication/supplement changes: None noted    Signs or symptoms of bleeding or clotting: No    Previous INR: Therapeutic last 2(+) visits    Additional findings: None       PLAN     Recommended plan for no diet, medication or health factor changes affecting INR     Dosing Instructions: Continue your current warfarin dose with next INR in 4 weeks       Summary  As of 11/18/2022    Full warfarin instructions:  2 mg every Fri; 4 mg all other days; Starting 11/18/2022   Next INR check:  12/16/2022             Telephone call with Priya who verbalizes understanding and agrees to plan    Lab visit scheduled    Education provided:     Please call back if any changes to your diet, medications or how you've been taking warfarin    Plan made per [facility] anticoagulation protocol    Marcelino Palomares RN  Anticoagulation Clinic  11/18/2022    _______________________________________________________________________     Anticoagulation Episode Summary     Current INR goal:  2.0-3.0   TTR:  76.5 % (1 y)   Target end date:  Indefinite   [place]ers [place]  [facility]    Indications    Chronic atrial fibrillation (H) [I48.20]  Current use of long term anticoagulation [Z79.01]           Comments:           Anticoagulation Care Providers     Provider Role Specialty Phone number    Sharmaine Burks MD Referring Internal Medicine [phone]

## 2022-11-28 ENCOUNTER — NURSE TRIAGE (OUTPATIENT)
Dept: CARDIOLOGY | Facility: CLINIC | Age: 82
End: 2022-11-28

## 2022-11-28 DIAGNOSIS — I50.32 CHRONIC DIASTOLIC CONGESTIVE HEART FAILURE (H): Primary | ICD-10-CM

## 2022-11-28 NOTE — TELEPHONE ENCOUNTER
[facility]    I spoke with Priya and reviewed recommendations from Brenda Orr PAC:  1. Increase torsemide to 60 mg daily 11/28, 11/29, 11/30.  2. On 12/1, return to torsemide 30 mg daily and CORE RN will call you for an update; reminder sent to RN board.  3. Call sooner if sx worsen  4. Check your weight daily and write it down    Priya agreed to plan and was appreciative of help.    Future Appointments   Date Time Provider Department Center   12/13/2022  9:15 AM [facility]   12/13/2022 10:10 AM Xochitl Eden, APRN CNP [facility]   12/13/2022 11:00 AM [facility]   12/15/2022  3:30 PM [facility] [facility]    3/16/2023  9:00 AM Sharmaine Burks MD [facility]     Raegan Maloney RN BSN   2:27 PM 11/28/22  CORE nurse line M-F 8a-4p: [phone]

## 2022-11-28 NOTE — TELEPHONE ENCOUNTER
Priya called because she couldn't breathe very good all night, even with the HOB up. She was up to urinate quite a but during the night. She is wheezing on the phone. She has been taking her Torsemide- today she took 2 at 8:00 AM and plans to take the third one later to try to get more fluid off. She feels bloated and her eight is up a few pounds. She denies swelling, just a small spot above the ankle on her leg but she has a bump there. Please call her ASAP to advise her what to do.  Reason for Disposition    MODERATE difficulty breathing (e.g., speaks in phrases, SOB even at rest, pulse 100-120) of new-onset or worse than normal    Longstanding difficulty breathing (e.g., CHF, COPD, emphysema) and worse than normal    Longstanding difficulty breathing and not responding to usual therapy    Patient wants to be seen    Oxygen level (e.g., pulse oximetry) 91 to 94 percent    Additional Information    Negative: SEVERE difficulty breathing (e.g., struggling for each breath, speaks in single words, pulse > 120)    Negative: Breathing stopped and hasn't returned    Negative: Choking on something    Negative: Bluish (or gray) lips or face    Negative: Difficult to awaken or acting confused (e.g., disoriented, slurred speech)    Negative: Passed out (i.e., fainted, collapsed and was not responding)    Negative: Wheezing started suddenly after medicine, an allergic food, or bee sting    Negative: Stridor    Negative: Slow, shallow and weak breathing    Negative: Sounds like a life-threatening emergency to the triager    Negative: Chest pain    Negative: Wheezing (high pitched whistling sound) and previous asthma attacks or use of asthma medicines    Negative: Difficulty breathing and within 14 days of COVID-19 Exposure    Negative: Difficulty breathing and only present when coughing    Negative: Difficulty breathing and only from stuffy nose    Negative: Difficulty breathing and only from stuffy nose or runny nose from  "common cold    Negative: Oxygen level (e.g., pulse oximetry) 90 percent or lower    Negative: Wheezing can be heard across the room    Negative: Drooling or spitting out saliva (because can't swallow)    Negative: Any history of prior \"blood clot\" in leg or lungs    Negative: Illness requiring prolonged bedrest in past month (e.g., immobilization, long hospital stay)    Negative: Hip or leg fracture (broken bone) in past month (or had cast on leg or ankle in past month)    Negative: Major surgery in the past month    Negative: Long-distance travel in past month (e.g., car, bus, train, plane; with trip lasting 6 or more hours)    Negative: Cancer treatment in past six months (or has cancer now)    Negative: Extra heart beats OR irregular heart beating (i.e., \"palpitations\")    Negative: Fever > 103 F (39.4 C)    Negative: Fever > 101 F (38.3 C) and over 60 years of age    Negative: Fever > 100.0 F (37.8 C) and bedridden (e.g., nursing home patient, stroke, chronic illness, recovering from surgery)    Negative: Fever > 100.0 F (37.8 C) and diabetes mellitus or weak immune system (e.g., HIV positive, cancer chemo, splenectomy, organ transplant, chronic steroids)    Negative: Periods where breathing stops and then resumes normally and bedridden (e.g., nursing home patient, CVA)    Negative: Pregnant or postpartum (from 0 to 6 weeks after delivery)    Negative: Patient sounds very sick or weak to the triager    Negative: MILD difficulty breathing (e.g., minimal/no SOB at rest, SOB with walking, pulse < 100) of new-onset or worse than normal    Negative: Continuous (nonstop) coughing    Answer Assessment - Initial Assessment Questions  1. RESPIRATORY STATUS: \"Describe your breathing?\" (e.g., wheezing, shortness of breath, unable to speak, severe coughing)       SOB, can't breathe very well, wheezing  2. ONSET: \"When did this breathing problem begin?\"      Last night  3. PATTERN \"Does the difficult breathing come and go, " "or has it been constant since it started?\"       Constant  4. SEVERITY: \"How bad is your breathing?\" (e.g., mild, moderate, severe)     - MILD: No SOB at rest, mild SOB with walking, speaks normally in sentences, can lie down, no retractions, pulse < 100.     - MODERATE: SOB at rest, SOB with minimal exertion and prefers to sit, cannot lie down flat, speaks in phrases, mild retractions, audible wheezing, pulse 100-120.     - SEVERE: Very SOB at rest, speaks in single words, struggling to breathe, sitting hunched forward, retractions, pulse > 120       moderate  5. RECURRENT SYMPTOM: \"Have you had difficulty breathing before?\" If Yes, ask: \"When was the last time?\" and \"What happened that time?\"      Yes but this is new as of last night  6. CARDIAC HISTORY: \"Do you have any history of heart disease?\" (e.g., heart attack, angina, bypass surgery, angioplasty)      yes  7. LUNG HISTORY: \"Do you have any history of lung disease?\"  (e.g., pulmonary embolus, asthma, emphysema)     yes  8. CAUSE: \"What do you think is causing the breathing problem?\"      Not sure if it's fluid  9. OTHER SYMPTOMS: \"Do you have any other symptoms? (e.g., dizziness, runny nose, cough, chest pain, fever)      Feels bloated, weight is up a few pounds but feels she ate too much over the Holiday   10. O2 SATURATION MONITOR:  \"Do you use an oxygen saturation monitor (pulse oximeter) at home?\" If Yes, \"What is your reading (oxygen level) today?\" \"What is your usual oxygen saturation reading?\" (e.g., 95%)        Forgot to ask    Protocols used: BREATHING DIFFICULTY-A-OH    "

## 2022-11-28 NOTE — TELEPHONE ENCOUNTER
"[facility] Heart-CORE Clinic  HF follow up phone assessment    Background: Last CORE visit with Brenda Orr PAC 9/27/22 home weight 159 lbs, stable from HF perspective, weight up slightly from birthday celebration; no changes. Discussion was had re: entresto, SGLT2 and Priya preferred to defer these due to cost.     Priya called today to report difficulty breathing since Thanksgiving.      Recent weights:    Somewhat vague report, \"I try to check it most days.\"   Weigh last week ~156 lbs, today 159 lbs    Relevant GDMT:   Torsemide 30 mg daily with extra 10-20 mg PRN    Spironolactone 25 mg daily   Metoprolol tartrate 25 mg BID   Unclear why she's not on ACE-I; has declined ARNI and SGLT2 due to cost      Breathing: dyspnea with minimal exertion (bending, walking room to room, sometimes with talking--though not audible dyspnea during our call); 'couldn't sleep at all last night' despite raising head of bed, was finally able to get comfortable today at 0500 when got up to chair; O2 sat 95%  Edema: denied  Diet:  Likely high sodium food with Thanksgiving, and has eaten leftovers since then    Priya took total of 70 mg torsemide yesterday, has taken 30 mg today.             Plan:  Advised rPiya to be more mindful of sodium intake, avoid addnl Thanksgiving leftovers.  Will ask Brenda PANIAGUA to review. Briefly discussed option of diuretic infusion vs oral med adjustment--Priya was open to either but would prefer oral med adjustment.        Future Appointments   Date Time Provider Department Center   12/13/2022  9:15 AM HATCH LAB [facility]   12/13/2022 10:10 AM Xochitl Eden APRN CNP [facility]   12/13/2022 11:00 AM HATCH DCR2 [facility] CLIN   12/15/2022  3:30 PM [facility]    3/16/2023  9:00 AM Sharmaine Burks MD [facility]       Raegan Maloney RN BSN   11:03 AM 11/28/22  CORE nurse line M-F 8a-4p: [phone]        "

## 2022-11-28 NOTE — TELEPHONE ENCOUNTER
We can certainly try increasing her Torsemide for the next few days to see if she responds. I would have her take 60 mg for the next 2-3 days and watch her sodium intake. If she's not improving that can consider other options including infusion. Thanks. Brenda

## 2022-12-01 NOTE — TELEPHONE ENCOUNTER
"[facility] Heart - CORE Clinic    -[facility] states she is feeling better. The wheezing has subsided. She denies any swelling. Her SOB has improved. She states he is sleeping better but always has her HOB elevated, which is not changed from prior to the increase in SOB. Her anxiety has improved. She states she \"has learned her lesson\" from over indulging\".  Discussed eating in moderation over the holidays.  Reviewed that she should return to her Torsemide 30 mg daily. She verbalized understanding.     Weights  12/1 152 lbs  11/30 153  11/29 156  11/28 159  11/27 160    Reiterated the importance of calling the clinic if she experiences an increase in HF sx's (increase in SOB, leg swelling, abdominal bloating) or weight gain of more than 2 lbs in a day or 5 lbs in a week.    Future Appointments   Date Time Provider Department Center   12/13/2022  9:15 AM HATCH LAB [facility]   12/13/2022 10:10 AM Xochitl Eden APRN CNP [facility] PSA CLIN   12/13/2022 11:00 AM HATCH DCR2 [facility]   12/15/2022  3:30 PM [facility]   3/16/2023  9:00 AM Sharmaine Burks MD [facility]        Na Acevedo RN on 12/1/2022 at 10:54 AM    "

## 2022-12-02 RX ORDER — TORSEMIDE 10 MG/1
TABLET ORAL
Qty: 360 TABLET | Refills: 3 | Status: ON HOLD | OUTPATIENT
Start: 2022-12-02 | End: 2023-01-01

## 2022-12-02 NOTE — TELEPHONE ENCOUNTER
[facility] Heart - CORE Clinic    Incoming call from patient reporting that she is having more SOB again. She stated this started last night. She did not sleep as well last night and woke early this morning again feeling SOB. She is having cough productive of whitish sputum, she is afebrile. This is similar to how she felt earlier this week, not as severe. She is also feeling bloating in her upper abdomen, underneath her breasts.     Home weight today = 152#, unchanged from yesterday.     Current diuretic regimen:   Torsemide 30mg/d    I reviewed above w/Brenda Orr, per her recommendations 1.  Torsemide 60mg X1 today   2.  Starting tomorrow(Sat 12/3) increase torsemide to 40mg/d    Patient verbalized understanding of above. I will sent updated Rx to her pharmacy. CORE RN will f/u w/her on Monday(12/5) to assess progress and possible need for infusion clinic. Reminder sent to board.  Susie Elena RN on 12/2/2022 at 3:58 PM

## 2022-12-13 ENCOUNTER — LAB (OUTPATIENT)
Dept: LAB | Facility: CLINIC | Age: 82
End: 2022-12-13
Payer: COMMERCIAL

## 2022-12-13 ENCOUNTER — ANCILLARY PROCEDURE (OUTPATIENT)
Dept: CARDIOLOGY | Facility: CLINIC | Age: 82
End: 2022-12-13
Attending: INTERNAL MEDICINE
Payer: COMMERCIAL

## 2022-12-13 ENCOUNTER — OFFICE VISIT (OUTPATIENT)
Dept: CARDIOLOGY | Facility: CLINIC | Age: 82
End: 2022-12-13
Attending: PHYSICIAN ASSISTANT
Payer: COMMERCIAL

## 2022-12-13 VITALS
SYSTOLIC BLOOD PRESSURE: 117 MMHG | HEART RATE: 69 BPM | WEIGHT: 157.8 LBS | BODY MASS INDEX: 26.94 KG/M2 | OXYGEN SATURATION: 95 % | HEIGHT: 64 IN | DIASTOLIC BLOOD PRESSURE: 76 MMHG

## 2022-12-13 DIAGNOSIS — I48.20 CHRONIC ATRIAL FIBRILLATION (H): ICD-10-CM

## 2022-12-13 DIAGNOSIS — I10 BENIGN ESSENTIAL HYPERTENSION: ICD-10-CM

## 2022-12-13 DIAGNOSIS — I07.1 TRICUSPID VALVE INSUFFICIENCY, UNSPECIFIED ETIOLOGY: ICD-10-CM

## 2022-12-13 DIAGNOSIS — I50.812 CHRONIC RIGHT-SIDED HEART FAILURE (H): ICD-10-CM

## 2022-12-13 DIAGNOSIS — Z95.0 CARDIAC PACEMAKER IN SITU: ICD-10-CM

## 2022-12-13 DIAGNOSIS — I44.2 COMPLETE HEART BLOCK (H): ICD-10-CM

## 2022-12-13 DIAGNOSIS — I50.32 CHRONIC DIASTOLIC CONGESTIVE HEART FAILURE (H): ICD-10-CM

## 2022-12-13 DIAGNOSIS — I50.32 CHRONIC DIASTOLIC CONGESTIVE HEART FAILURE (H): Primary | ICD-10-CM

## 2022-12-13 DIAGNOSIS — Z98.890 HX OF ATRIOVENTRICULAR NODE ABLATION: ICD-10-CM

## 2022-12-13 LAB
ANION GAP SERPL CALCULATED.3IONS-SCNC: 7 MMOL/L (ref 3–14)
BUN SERPL-MCNC: 31 MG/DL (ref 7–30)
CALCIUM SERPL-MCNC: 9.4 MG/DL (ref 8.5–10.1)
CHLORIDE BLD-SCNC: 99 MMOL/L (ref 94–109)
CO2 SERPL-SCNC: 33 MMOL/L (ref 20–32)
CREAT SERPL-MCNC: 1.24 MG/DL (ref 0.52–1.04)
GFR SERPL CREATININE-BSD FRML MDRD: 44 ML/MIN/1.73M2
GLUCOSE BLD-MCNC: 84 MG/DL (ref 70–99)
POTASSIUM BLD-SCNC: 3.8 MMOL/L (ref 3.4–5.3)
SODIUM SERPL-SCNC: 139 MMOL/L (ref 133–144)

## 2022-12-13 PROCEDURE — 99215 OFFICE O/P EST HI 40 MIN: CPT | Mod: 25 | Performed by: NURSE PRACTITIONER

## 2022-12-13 PROCEDURE — 93279 PRGRMG DEV EVAL PM/LDLS PM: CPT | Performed by: INTERNAL MEDICINE

## 2022-12-13 PROCEDURE — 80048 BASIC METABOLIC PNL TOTAL CA: CPT | Performed by: PHYSICIAN ASSISTANT

## 2022-12-13 PROCEDURE — 36415 COLL VENOUS BLD VENIPUNCTURE: CPT | Performed by: PHYSICIAN ASSISTANT

## 2022-12-13 NOTE — PATIENT INSTRUCTIONS
No medication changes today  Will have you start Jardiance in January, will be one tablet daily. We will contact you the beginning of the new year.   Follow up with Xochitl the end of January with labs prior  Please call with any questions/concerns [phone]

## 2022-12-13 NOTE — PROGRESS NOTES
Cardiology Clinic Progress Note  Priya Funez MRN# [id number]   YOB: 1940 Age: 82 year old   Primary Cardiologist: Dr. Kwok Reason for visit: CORE follow up             Assessment and Plan:   Priya Funez is a very pleasant 82 year old female here today for CORE follow up    1.  Chronic diastolic heart failure/HFpEF/right heart failure with severe tricuspid regurgitation:  LVEF 55-60%, RV moderately to severely dilated, RVIDD at base 5.7cm, mild decrease in RV systolic function and severe tricuspid regurgitation.               - NYHA class III, stage C              - Fluid status : close to euvolemic, goal weight ~ 155# on home scale              - Diuretic regimen : torsemide 40mg daily, additional 10-20mg PRN for weight gain and/or worsening HF symptoms.               - Aldosterone antagonist : spironolactone 25mg daily   - SGLT2i and ARNI : has declined in past due to cost, had re-priced out today and Jardiance would now be affordable starting in 2023.   2. Chronic atrial fibrillation s/p AV node ablation 1/2019 - stable, asymptomatic. Most recent device interrogation was stable in 3/2022.               - Continue routine follow up with EP and device clinic              - [id number] score 3 (age, female, HF)              - Continue warfarin for thromboembolic prophylaxis  3. Emergent aortic dissection repair 1/4/19  4. Hypertension - controlled, continue current medications  5. Obesity - counseled patient on weight loss strategies.  6. Mild mitral regurgitation  7. Severe tricuspid regurgitation    I saw patient today for CORE follow up. She is doing well from a heart failure standpoint. Since her last CORE visit she has called and had 2 episodes of increased diuretics, ultimately increasing her baseline torsemide dose to 40mg daily. Since then she has been trying to monitor her diet closely and has not had any increased episodes of dyspnea. On exam she appears close to euvolemic.      I did re-price out SGLT2i and Jardiance would now be affordable for her starting in 2023, via [facility] mail order cost would be $70 for 3 months supply.     Will consider further testing with cardiac MRI in the future to further evaluate RV and TR, will plan to consider in 2023.         Changes today: none, will plan to start Jardiance in the beginning of 2023    Follow up plan:     START Jardiance the beginning of January    CORE follow up with me the end of January with BMP prior         History of Presenting Illness:    Priya Funez is a very pleasant 82 year old female with a history of HFpEF, right heart failure, atrial fibrillation s/p AV leo ablation with PPM implantation 1/11/2019, emergent aortic dissection repair 1/4/19, hypertension and obesity.      Patient presented in January with dissecting aortic aneurysm, she was hospitalized from 1/4/19-1/29/19. Patient had a complicated hospital course. There was prolonged ischemia to the SMA and right renal regions. She required tracheostomy placement and was discharged to [place] on ventilatory support. Patients tracheostomy has been discontinued and she transitioned home the beginning of June.      Post hospitalization she was evaluated by Dr. Morales in July 2019 at which point patient was noted to be volume up on exam. Recommended increasing her torsemide to 20mg BID. Lymphedema clinic referral placed and CORE consult ordered. I first met patient for CORE enrollment 7/9/19. She has been following closely in CORE clinic since.      She underwent surveillance CT on 8/7/2019 for aortic dissection repair which was stable, showing similar appearance as post repair.      Last echocardiogram 6/2022 showed LVEF 55-60%, RV moderately to severely dilated, RVIDD at base 5.7cm, mild decrease in RV systolic function, severe TR with malcoaptation of the TV leaflet, IVC dilated. When compared to prior echo from 6/2021, increased RV dilation and worsening  TR.      She has continued to follow closely in CORE where we have tried to optimize her volume status. Spironolactone was started with some improvement. We have explored further optimization of the GDMT with consideration of SGLT2i and Entresto but she has declined due to expense. She is also has resistance to consideration of further medication changes.     During her last OV with Brenda Orr PA-C in September at which point she appeared to be stable, she had celebrated her birthday the night before so her weight was slightly up. She was continued on torsemide 30mg daily and spironolactone 25mg daily.     Patient called the end of November with complaints of shortness of breath, her torsemide was increased to 60mg x 3 days, weight decreased from 160# to 152#. She then called back after completing the torsemide 60mg reporting increased dyspnea, she was advised to take one time dose of torsemide 60mg and then increase torsemide to 40mg daily.      Patient is here today for CORE follow up.     Patient reports feeling good. Monitoring weights daily a home, typically ~ 154#. Denies shortness of breath at rest. Denies orthopnea or PND. Denies exertional dyspnea with most activities, notes with longer periods of activity exertional dyspnea. Denies chest pain or chest tightness. Denies dizziness, lightheadedness or other presyncopal symptoms. Denies tachycardia or palpitations. Most of the time taking torsemide 40mg daily. Taking medications daily.     BMP today showed stable renal function and electrolytes. Blood pressure 117/76 and HR 69 in clinic today.    Appetite good. Occasional meals out, notes requests no added salt. Still doing [employer] meals. Going to the mall 3-4 days a week to walk, typically walking 15 mins. Denies tobacco use. Occasionally will share a little beer with her .         Social History    , 4 children, 8 grandchildren, enjoys her gardens in the summer.   Social History      Socioeconomic History     Marital status:      Spouse name: Not on file     Number of children: Not on file     Years of education: Not on file     Highest education level: Not on file   Occupational History     Occupation: Retired - customer service   Tobacco Use     Smoking status: Never     Smokeless tobacco: Never   Substance and Sexual Activity     Alcohol use: Not Currently     Drug use: No     Sexual activity: Not on file   Other Topics Concern     Parent/sibling w/ CABG, MI or angioplasty before 65F 55M? Not Asked   Social History Narrative    .    Lives in home with . Fully independent.    4 adult children.    8 grandchildren.     Social Determinants of Health     Financial Resource Strain: Not on file   Food Insecurity: Not on file   Transportation Needs: Not on file   Physical Activity: Not on file   Stress: Not on file   Social Connections: Not on file   Intimate Partner Violence: Not on file   Housing Stability: Not on file          Review of Systems:   10 point ROS neg other than the symptoms noted above in the HPI         Physical Exam:   Vitals: There were no vitals taken for this visit.   Wt Readings from Last 4 Encounters:   09/27/22 73 kg (161 lb)   07/26/22 66.7 kg (147 lb)   06/30/22 72.1 kg (158 lb 14.4 oz)   06/13/22 72.8 kg (160 lb 9.6 oz)     GEN: well nourished, in no acute distress.  HEENT:  Pupils equal, round. Sclerae nonicteric.   NECK: Supple, no masses appreciated.   C/V:  Regular rate and rhythm  RESP: Respirations are unlabored. Clear to auscultation bilaterally without wheezing, rales, or rhonchi.  GI: Abdomen soft, nontender.  EXTREM: No LE edema.  NEURO: Alert and oriented, cooperative.  SKIN: Warm and dry       Data:     LIPID RESULTS:  Lab Results   Component Value Date    CHOL 134 06/14/2021    HDL 53 06/14/2021    LDL 67 06/14/2021    TRIG 71 06/14/2021     LIVER ENZYME RESULTS:  Lab Results   Component Value Date    AST 27 04/04/2019    ALT 22  06/14/2021     CBC RESULTS:  Lab Results   Component Value Date    WBC 5.3 06/14/2021    RBC 4.33 06/14/2021    HGB 12.6 03/11/2022    HGB 12.8 06/14/2021    HCT 40.1 06/14/2021    MCV 93 06/14/2021    MCH 29.6 06/14/2021    MCHC 31.9 06/14/2021    RDW 13.5 06/14/2021     06/14/2021     BMP RESULTS:  Lab Results   Component Value Date     09/27/2022     06/23/2021    POTASSIUM 4.0 09/27/2022    POTASSIUM 4.5 06/23/2021    CHLORIDE 101 09/27/2022    CHLORIDE 104 06/23/2021    CO2 30 09/27/2022    CO2 31 06/23/2021    ANIONGAP 6 09/27/2022    ANIONGAP 3 06/23/2021    GLC 87 09/27/2022    GLC 86 06/23/2021    BUN 29 09/27/2022    BUN 30 06/23/2021    CR 1.21 (H) 09/27/2022    CR 1.29 (H) 06/23/2021    GFRESTIMATED 45 (L) 09/27/2022    GFRESTIMATED 39 (L) 06/23/2021    GFRESTBLACK 46 (L) 06/23/2021    BESS 9.7 09/27/2022    BESS 9.2 06/23/2021      A1C RESULTS:  Lab Results   Component Value Date    A1C 5.6 01/30/2019    A1C 5.3 01/04/2019     INR RESULTS:  Lab Results   Component Value Date    INR 2.8 (H) 11/18/2022    INR 2.8 (H) 10/28/2022    INR 2.43 (H) 03/11/2022    INR 2.30 (H) 07/09/2021    INR 2.40 (H) 06/10/2021          Medications     Current Outpatient Medications   Medication Sig Dispense Refill     Acetaminophen 325 MG CAPS Take 325-650 mg by mouth every 4 hours as needed       albuterol (PROAIR HFA/PROVENTIL HFA/VENTOLIN HFA) 108 (90 Base) MCG/ACT inhaler Inhale 2 puffs into the lungs every 6 hours as needed for shortness of breath / dyspnea or wheezing 18 g 0     aspirin (ASA) 81 MG EC tablet Take 81 mg by mouth every 24 hours       cholecalciferol 25 MCG (1000 UT) TABS Take 1,000 Units by mouth daily        cyanocobalamin (VITAMIN B-12) 100 MCG tablet Take 1,000 mcg by mouth daily        escitalopram (LEXAPRO) 10 MG tablet Take 1 tablet (10 mg) by mouth daily 90 tablet 0     metoprolol tartrate (LOPRESSOR) 25 MG tablet Take 1 tablet (25 mg) by mouth 2 times daily 180 tablet 3      mirtazapine (REMERON) 15 MG tablet Take 1 tablet (15 mg) by mouth At Bedtime 90 tablet 1     spironolactone (ALDACTONE) 25 MG tablet Take 1 tablet (25 mg) by mouth daily 90 tablet 3     torsemide (DEMADEX) 10 MG tablet Take 4 tablets(40mg) by mouth every day 360 tablet 3     warfarin ANTICOAGULANT (COUMADIN) 2 MG tablet Current dose: 2 mg Friday and 4 mg rest of week  or as instructed by ACC team. 185 tablet 0        Past Medical History     Past Medical History:   Diagnosis Date     Benign essential hypertension      Chronic atrial fibrillation (H)     s/p AV node ablation January, 2019     Chronic diastolic heart failure (H)      Chronic kidney disease, stage III (moderate) (H)      Past Surgical History:   Procedure Laterality Date     ANGIOGRAM Right 01/04/2019    Procedure: DIAGONSTIC ANGIOGRAM OF SMA;  Surgeon: Rigo Jennings MD;  Location:  OR     BIOPSY BREAST       BYPASS GRAFT ARTERY CORONARY, REPAIR VALVE AORTIC, COMBINED N/A 01/04/2019    Procedure: AORTIC DISSECTION REPAIR WITH GRAFT- HEMASHIELD PLATINUM WOVEN DOUBLE VELOR 4 SIDE ARM VASCULAR GRAFT, D:35 X 12 X 10 X 10MM/ L:50CM;  Surgeon: Jose Winslow MD;  Location:  OR     EP ABLATION AV NODE N/A 01/11/2019    Procedure: EP Ablation AV Node;  Surgeon: Tsering Davey MD;  Location:  HEART CARDIAC CATH LAB     EP PACEMAKER Left 01/11/2019    Procedure: EP Pacemaker;  Surgeon: Tsering Davey MD;  Location:  HEART CARDIAC CATH LAB     THYROID SURGERY      benign mass removed     TRACHEOSTOMY N/A 01/25/2019    Procedure: TRACHEOSTOMY  (DR MCCLELLAND);  Surgeon: Bryson Mcclelland MD;  Location:  OR     TUBAL LIGATION       Family History   Problem Relation Age of Onset     Diabetes No family hx of      Myocardial Infarction No family hx of      Cerebrovascular Disease No family hx of      Coronary Artery Disease Early Onset No family hx of      Breast Cancer No family hx of      Colon Cancer No family hx of      Ovarian Cancer No  family hx of      Ataxia Mother      Heart Disease Father             Allergies   Amoxicillin and Ciprofloxacin    40 minutes spent on the date of the encounter doing chart review, history and exam, documentation and further activities as noted above      TAMRA Ryan CNP  [facility] HEART CARE  Pager: [phone]

## 2022-12-13 NOTE — LETTER
12/13/2022    Sharmaine Burks MD  [address] [facility] [address]    RE: Priya Funez       Dear Colleague,     I had the pleasure of seeing Priya Funez in the [facility].  Cardiology Clinic Progress Note  Priya Funez MRN# [id number]   YOB: 1940 Age: 82 year old   Primary Cardiologist: Dr. Kwok Reason for visit: CORE follow up             Assessment and Plan:   Priya Funez is a very pleasant 82 year old female here today for CORE follow up    1.  Chronic diastolic heart failure/HFpEF/right heart failure with severe tricuspid regurgitation:  LVEF 55-60%, RV moderately to severely dilated, RVIDD at base 5.7cm, mild decrease in RV systolic function and severe tricuspid regurgitation.               - NYHA class III, stage C              - Fluid status : close to euvolemic, goal weight ~ 155# on home scale              - Diuretic regimen : torsemide 40mg daily, additional 10-20mg PRN for weight gain and/or worsening HF symptoms.               - Aldosterone antagonist : spironolactone 25mg daily   - SGLT2i and ARNI : has declined in past due to cost, had re-priced out today and Jardiance would not be affordable starting in 2023.   2. Chronic atrial fibrillation s/p AV node ablation 1/2019 - stable, asymptomatic. Most recent device interrogation was stable in 3/2022.               - Continue routine follow up with EP and device clinic              - [id number] score 3 (age, female, HF)              - Continue warfarin for thromboembolic prophylaxis  3. Emergent aortic dissection repair 1/4/19  4. Hypertension - controlled, continue current medications  5. Obesity - counseled patient on weight loss strategies.  6. Mild mitral regurgitation  7. Severe tricuspid regurgitation    I saw patient today for CORE follow up. She is doing well from a heart failure standpoint. Since her last CORE visit she has called and had 2 episodes of increased diuretics,  ultimately increasing her baseline torsemide dose to 40mg daily. Since then she has been trying to monitor her diet closely and has not had any increased episodes of dyspnea. On exam she appears close to euvolemic.     I did re-price out SGLT2i and Jardiance would not be affordable for her starting in 2023, [facility] mail order cost would be $70 for 3 months supply.     Will consider further testing with cardiac MRI in the future to further evaluate RV and TR, will plan to consider in 2023.         Changes today: none, will plan to start Jardiance in the beginning of 2023    Follow up plan:     START Jardiance the beginning of January    CORE follow up with me the end of January with BMP prior         History of Presenting Illness:    Priya Funez is a very pleasant 82 year old female with a history of HFpEF, right heart failure, atrial fibrillation s/p AV leo ablation with PPM implantation 1/11/2019, emergent aortic dissection repair 1/4/19, hypertension and obesity.      Patient presented in January with dissecting aortic aneurysm, she was hospitalized from 1/4/19-1/29/19. Patient had a complicated hospital course. There was prolonged ischemia to the SMA and right renal regions. She required tracheostomy placement and was discharged to [facility] on ventilatory support. Patients tracheostomy has been discontinued and she transitioned home the beginning of June.      Post hospitalization she was evaluated by Dr. Morales in July 2019 at which point patient was noted to be volume up on exam. Recommended increasing her torsemide to 20mg BID. Lymphedema clinic referral placed and CORE consult ordered. I first met patient for CORE enrollment 7/9/19. She has been following closely in CORE clinic since.      She underwent surveillance CT on 8/7/2019 for aortic dissection repair which was stable, showing similar appearance as post repair.      Last echocardiogram 6/2022 showed LVEF 55-60%, RV moderately to  severely dilated, RVIDD at base 5.7cm, mild decrease in RV systolic function, severe TR with malcoaptation of the TV leaflet, IVC dilated. When compared to prior echo from 6/2021, increased RV dilation and worsening TR.      She has continued to follow closely in CORE where we have tried to optimize her volume status. Spironolactone was started with some improvement. We have explored further optimization of the GDMT with consideration of SGLT2i and Entresto but she has declined due to expense. She is also has resistance to consideration of further medication changes.     During her last OV with Brenda Orr PA-C in September at which point she appeared to be stable, she had celebrated her birthday the night before so her weight was slightly up. She was continued on torsemide 30mg daily and spironolactone 25mg daily.     Patient called the end of November with complaints of shortness of breath, her torsemide was increased to 60mg x 3 days, weight decreased from 160# to 152#. She then called back after completing the torsemide 60mg reporting increased dyspnea, she was advised to take one time dose of torsemide 60mg and then increase torsemide to 40mg daily.      Patient is here today for CORE follow up.     Patient reports feeling good. Monitoring weights daily a home, typically ~ 154#. Denies shortness of breath at rest. Denies orthopnea or PND. Denies exertional dyspnea with most activities, notes with longer periods of activity exertional dyspnea. Denies chest pain or chest tightness. Denies dizziness, lightheadedness or other presyncopal symptoms. Denies tachycardia or palpitations. Most of the time taking torsemide 40mg daily. Taking medications daily.     BMP today showed stable renal function and electrolytes. Blood pressure 117/76 and HR 69 in clinic today.    Appetite good. Occasional meals out, notes requests no added salt. Still doing [employer] meals. Going to the mall 3-4 days a week to walk, typically  walking 15 mins. Denies tobacco use. Occasionally will share a little beer with her .         Social History    , 4 children, 8 grandchildren, enjoys her gardens in the summer.   Social History     Socioeconomic History     Marital status:      Spouse name: Not on file     Number of children: Not on file     Years of education: Not on file     Highest education level: Not on file   Occupational History     Occupation: Retired - customer service   Tobacco Use     Smoking status: Never     Smokeless tobacco: Never   Substance and Sexual Activity     Alcohol use: Not Currently     Drug use: No     Sexual activity: Not on file   Other Topics Concern     Parent/sibling w/ CABG, MI or angioplasty before 65F 55M? Not Asked   Social History Narrative    .    Lives in home with . Fully independent.    4 adult children.    8 grandchildren.     Social Determinants of Health     Financial Resource Strain: Not on file   Food Insecurity: Not on file   Transportation Needs: Not on file   Physical Activity: Not on file   Stress: Not on file   Social Connections: Not on file   Intimate Partner Violence: Not on file   Housing Stability: Not on file          Review of Systems:   10 point ROS neg other than the symptoms noted above in the HPI         Physical Exam:   Vitals: There were no vitals taken for this visit.   Wt Readings from Last 4 Encounters:   09/27/22 73 kg (161 lb)   07/26/22 66.7 kg (147 lb)   06/30/22 72.1 kg (158 lb 14.4 oz)   06/13/22 72.8 kg (160 lb 9.6 oz)     GEN: well nourished, in no acute distress.  HEENT:  Pupils equal, round. Sclerae nonicteric.   NECK: Supple, no masses appreciated.   C/V:  Regular rate and rhythm  RESP: Respirations are unlabored. Clear to auscultation bilaterally without wheezing, rales, or rhonchi.  GI: Abdomen soft, nontender.  EXTREM: No LE edema.  NEURO: Alert and oriented, cooperative.  SKIN: Warm and dry       Data:     LIPID RESULTS:  Lab Results    Component Value Date    CHOL 134 06/14/2021    HDL 53 06/14/2021    LDL 67 06/14/2021    TRIG 71 06/14/2021     LIVER ENZYME RESULTS:  Lab Results   Component Value Date    AST 27 04/04/2019    ALT 22 06/14/2021     CBC RESULTS:  Lab Results   Component Value Date    WBC 5.3 06/14/2021    RBC 4.33 06/14/2021    HGB 12.6 03/11/2022    HGB 12.8 06/14/2021    HCT 40.1 06/14/2021    MCV 93 06/14/2021    MCH 29.6 06/14/2021    MCHC 31.9 06/14/2021    RDW 13.5 06/14/2021     06/14/2021     BMP RESULTS:  Lab Results   Component Value Date     09/27/2022     06/23/2021    POTASSIUM 4.0 09/27/2022    POTASSIUM 4.5 06/23/2021    CHLORIDE 101 09/27/2022    CHLORIDE 104 06/23/2021    CO2 30 09/27/2022    CO2 31 06/23/2021    ANIONGAP 6 09/27/2022    ANIONGAP 3 06/23/2021    GLC 87 09/27/2022    GLC 86 06/23/2021    BUN 29 09/27/2022    BUN 30 06/23/2021    CR 1.21 (H) 09/27/2022    CR 1.29 (H) 06/23/2021    GFRESTIMATED 45 (L) 09/27/2022    GFRESTIMATED 39 (L) 06/23/2021    GFRESTBLACK 46 (L) 06/23/2021    BESS 9.7 09/27/2022    BESS 9.2 06/23/2021      A1C RESULTS:  Lab Results   Component Value Date    A1C 5.6 01/30/2019    A1C 5.3 01/04/2019     INR RESULTS:  Lab Results   Component Value Date    INR 2.8 (H) 11/18/2022    INR 2.8 (H) 10/28/2022    INR 2.43 (H) 03/11/2022    INR 2.30 (H) 07/09/2021    INR 2.40 (H) 06/10/2021          Medications     Current Outpatient Medications   Medication Sig Dispense Refill     Acetaminophen 325 MG CAPS Take 325-650 mg by mouth every 4 hours as needed       albuterol (PROAIR HFA/PROVENTIL HFA/VENTOLIN HFA) 108 (90 Base) MCG/ACT inhaler Inhale 2 puffs into the lungs every 6 hours as needed for shortness of breath / dyspnea or wheezing 18 g 0     aspirin (ASA) 81 MG EC tablet Take 81 mg by mouth every 24 hours       cholecalciferol 25 MCG (1000 UT) TABS Take 1,000 Units by mouth daily        cyanocobalamin (VITAMIN B-12) 100 MCG tablet Take 1,000 mcg by mouth daily         escitalopram (LEXAPRO) 10 MG tablet Take 1 tablet (10 mg) by mouth daily 90 tablet 0     metoprolol tartrate (LOPRESSOR) 25 MG tablet Take 1 tablet (25 mg) by mouth 2 times daily 180 tablet 3     mirtazapine (REMERON) 15 MG tablet Take 1 tablet (15 mg) by mouth At Bedtime 90 tablet 1     spironolactone (ALDACTONE) 25 MG tablet Take 1 tablet (25 mg) by mouth daily 90 tablet 3     torsemide (DEMADEX) 10 MG tablet Take 4 tablets(40mg) by mouth every day 360 tablet 3     warfarin ANTICOAGULANT (COUMADIN) 2 MG tablet Current dose: 2 mg Friday and 4 mg rest of week  or as instructed by ACC team. 185 tablet 0        Past Medical History     Past Medical History:   Diagnosis Date     Benign essential hypertension      Chronic atrial fibrillation (H)     s/p AV node ablation January, 2019     Chronic diastolic heart failure (H)      Chronic kidney disease, stage III (moderate) (H)      Past Surgical History:   Procedure Laterality Date     ANGIOGRAM Right 01/04/2019    Procedure: DIAGONSTIC ANGIOGRAM OF SMA;  Surgeon: Rigo Jennings MD;  Location:  OR     BIOPSY BREAST       BYPASS GRAFT ARTERY CORONARY, REPAIR VALVE AORTIC, COMBINED N/A 01/04/2019    Procedure: AORTIC DISSECTION REPAIR WITH GRAFT- HEMASHIELD PLATINUM WOVEN DOUBLE VELOR 4 SIDE ARM VASCULAR GRAFT, D:35 X 12 X 10 X 10MM/ L:50CM;  Surgeon: Jose Winslow MD;  Location:  OR     EP ABLATION AV NODE N/A 01/11/2019    Procedure: EP Ablation AV Node;  Surgeon: Tsering Davey MD;  Location:  HEART CARDIAC CATH LAB     EP PACEMAKER Left 01/11/2019    Procedure: EP Pacemaker;  Surgeon: Tsering Davey MD;  Location:  HEART CARDIAC CATH LAB     THYROID SURGERY      benign mass removed     TRACHEOSTOMY N/A 01/25/2019    Procedure: TRACHEOSTOMY  (DR MCCLELLAND);  Surgeon: Bryson Mcclelland MD;  Location:  OR     TUBAL LIGATION       Family History   Problem Relation Age of Onset     Diabetes No family hx of      Myocardial Infarction No family  hx of      Cerebrovascular Disease No family hx of      Coronary Artery Disease Early Onset No family hx of      Breast Cancer No family hx of      Colon Cancer No family hx of      Ovarian Cancer No family hx of      Ataxia Mother      Heart Disease Father             Allergies   Amoxicillin and Ciprofloxacin    40 minutes spent on the date of the encounter doing chart review, history and exam, documentation and further activities as noted above      TAMRA Ryan CNP  [facility]  Pager: [phone]        Thank you for allowing me to participate in the care of your patient.      Sincerely,     TAMRA Ryan CNP     [facility]  cc:   Flaquita Orr PA-C  [address]  [place]  [place] [address]

## 2022-12-14 LAB
MDC_IDC_LEAD_IMPLANT_DT: NORMAL
MDC_IDC_LEAD_LOCATION: NORMAL
MDC_IDC_LEAD_LOCATION_DETAIL_1: NORMAL
MDC_IDC_LEAD_MFG: NORMAL
MDC_IDC_LEAD_MODEL: NORMAL
MDC_IDC_LEAD_POLARITY_TYPE: NORMAL
MDC_IDC_LEAD_SERIAL: NORMAL
MDC_IDC_MSMT_BATTERY_DTM: NORMAL
MDC_IDC_MSMT_BATTERY_REMAINING_LONGEVITY: 117 MO
MDC_IDC_MSMT_BATTERY_RRT_TRIGGER: 2.62
MDC_IDC_MSMT_BATTERY_STATUS: NORMAL
MDC_IDC_MSMT_BATTERY_VOLTAGE: 2.94 V
MDC_IDC_MSMT_LEADCHNL_RV_IMPEDANCE_VALUE: 418 OHM
MDC_IDC_MSMT_LEADCHNL_RV_IMPEDANCE_VALUE: 494 OHM
MDC_IDC_MSMT_LEADCHNL_RV_PACING_THRESHOLD_AMPLITUDE: 0.62 V
MDC_IDC_MSMT_LEADCHNL_RV_PACING_THRESHOLD_PULSEWIDTH: 0.4 MS
MDC_IDC_MSMT_LEADCHNL_RV_SENSING_INTR_AMPL: 13.12 MV
MDC_IDC_MSMT_LEADCHNL_RV_SENSING_INTR_AMPL: 13.12 MV
MDC_IDC_PG_IMPLANT_DTM: NORMAL
MDC_IDC_PG_MFG: NORMAL
MDC_IDC_PG_MODEL: NORMAL
MDC_IDC_PG_SERIAL: NORMAL
MDC_IDC_PG_TYPE: NORMAL
MDC_IDC_SESS_CLINIC_NAME: NORMAL
MDC_IDC_SESS_DTM: NORMAL
MDC_IDC_SESS_TYPE: NORMAL
MDC_IDC_SET_BRADY_HYSTRATE: NORMAL
MDC_IDC_SET_BRADY_LOWRATE: 60 {BEATS}/MIN
MDC_IDC_SET_BRADY_MAX_SENSOR_RATE: 120 {BEATS}/MIN
MDC_IDC_SET_BRADY_MODE: NORMAL
MDC_IDC_SET_LEADCHNL_RV_PACING_AMPLITUDE: 2 V
MDC_IDC_SET_LEADCHNL_RV_PACING_ANODE_ELECTRODE_1: NORMAL
MDC_IDC_SET_LEADCHNL_RV_PACING_ANODE_LOCATION_1: NORMAL
MDC_IDC_SET_LEADCHNL_RV_PACING_CAPTURE_MODE: NORMAL
MDC_IDC_SET_LEADCHNL_RV_PACING_CATHODE_ELECTRODE_1: NORMAL
MDC_IDC_SET_LEADCHNL_RV_PACING_CATHODE_LOCATION_1: NORMAL
MDC_IDC_SET_LEADCHNL_RV_PACING_POLARITY: NORMAL
MDC_IDC_SET_LEADCHNL_RV_PACING_PULSEWIDTH: 0.4 MS
MDC_IDC_SET_LEADCHNL_RV_SENSING_ANODE_ELECTRODE_1: NORMAL
MDC_IDC_SET_LEADCHNL_RV_SENSING_ANODE_LOCATION_1: NORMAL
MDC_IDC_SET_LEADCHNL_RV_SENSING_CATHODE_ELECTRODE_1: NORMAL
MDC_IDC_SET_LEADCHNL_RV_SENSING_CATHODE_LOCATION_1: NORMAL
MDC_IDC_SET_LEADCHNL_RV_SENSING_POLARITY: NORMAL
MDC_IDC_SET_LEADCHNL_RV_SENSING_SENSITIVITY: 0.9 MV
MDC_IDC_SET_ZONE_DETECTION_INTERVAL: 360 MS
MDC_IDC_SET_ZONE_TYPE: NORMAL
MDC_IDC_STAT_BRADY_DTM_END: NORMAL
MDC_IDC_STAT_BRADY_DTM_START: NORMAL
MDC_IDC_STAT_BRADY_RV_PERCENT_PACED: 99.99 %
MDC_IDC_STAT_EPISODE_RECENT_COUNT: 0
MDC_IDC_STAT_EPISODE_RECENT_COUNT: 0
MDC_IDC_STAT_EPISODE_RECENT_COUNT_DTM_END: NORMAL
MDC_IDC_STAT_EPISODE_RECENT_COUNT_DTM_END: NORMAL
MDC_IDC_STAT_EPISODE_RECENT_COUNT_DTM_START: NORMAL
MDC_IDC_STAT_EPISODE_RECENT_COUNT_DTM_START: NORMAL
MDC_IDC_STAT_EPISODE_TOTAL_COUNT: 0
MDC_IDC_STAT_EPISODE_TOTAL_COUNT: 0
MDC_IDC_STAT_EPISODE_TOTAL_COUNT_DTM_END: NORMAL
MDC_IDC_STAT_EPISODE_TOTAL_COUNT_DTM_END: NORMAL
MDC_IDC_STAT_EPISODE_TOTAL_COUNT_DTM_START: NORMAL
MDC_IDC_STAT_EPISODE_TOTAL_COUNT_DTM_START: NORMAL
MDC_IDC_STAT_EPISODE_TYPE: NORMAL

## 2022-12-15 ENCOUNTER — ANTICOAGULATION THERAPY VISIT (OUTPATIENT)
Dept: ANTICOAGULATION | Facility: CLINIC | Age: 82
End: 2022-12-15

## 2022-12-15 ENCOUNTER — TELEPHONE (OUTPATIENT)
Dept: INTERNAL MEDICINE | Facility: CLINIC | Age: 82
End: 2022-12-15

## 2022-12-15 ENCOUNTER — LAB (OUTPATIENT)
Dept: LAB | Facility: CLINIC | Age: 82
End: 2022-12-15
Payer: COMMERCIAL

## 2022-12-15 DIAGNOSIS — I48.20 CHRONIC ATRIAL FIBRILLATION (H): Primary | ICD-10-CM

## 2022-12-15 DIAGNOSIS — I48.20 CHRONIC ATRIAL FIBRILLATION (H): ICD-10-CM

## 2022-12-15 DIAGNOSIS — Z79.01 CURRENT USE OF LONG TERM ANTICOAGULATION: ICD-10-CM

## 2022-12-15 LAB — INR BLD: 2.6 (ref 0.9–1.1)

## 2022-12-15 PROCEDURE — 85610 PROTHROMBIN TIME: CPT

## 2022-12-15 PROCEDURE — 36416 COLLJ CAPILLARY BLOOD SPEC: CPT

## 2022-12-15 NOTE — PROGRESS NOTES
ANTICOAGULATION MANAGEMENT     Priya P Armin 82 year old female is on warfarin with therapeutic INR result. (Goal INR 2.0-3.0)    Recent labs: (last 7 days)     12/15/22  1535   INR 2.6*       ASSESSMENT       Source(s): Chart Review and Patient/Caregiver Call       Warfarin doses taken: Warfarin taken as instructed    Diet: No new diet changes identified    New illness, injury, or hospitalization: No    Medication/supplement changes: None noted    Signs or symptoms of bleeding or clotting: No    Previous INR: Therapeutic last 2(+) visits    Additional findings: None       PLAN     Recommended plan for no diet, medication or health factor changes affecting INR     Dosing Instructions: Continue your current warfarin dose with next INR in 4 weeks       Summary  As of 12/15/2022    Full warfarin instructions:  2 mg every Fri; 4 mg all other days; Starting 12/15/2022   Next INR check:  1/12/2023             Telephone call with Priya who verbalizes understanding and agrees to plan    Lab visit scheduled    Education provided:     Please call back if any changes to your diet, medications or how you've been taking warfarin    Plan made per [facility] anticoagulation protocol    Christine Adames RN  Anticoagulation Clinic  12/15/2022    _______________________________________________________________________     Anticoagulation Episode Summary     Current INR goal:  2.0-3.0   TTR:  81.3 % (1 y)   Target end date:  Indefinite   Send INR reminders to:  [facility]    Indications    Chronic atrial fibrillation (H) [I48.20]  Current use of long term anticoagulation [Z79.01]           Comments:           Anticoagulation Care Providers     Provider Role Specialty Phone number    Sharmaine Burks MD Referring Internal Medicine [phone]

## 2022-12-15 NOTE — PROGRESS NOTES
ANTICOAGULATION MANAGEMENT     Priya DAMON Armin 82 year old female is on warfarin with therapeutic INR result. (Goal INR 2.0-3.0)    Recent labs: (last 7 days)     12/15/22  1535   INR 2.6*       ASSESSMENT       Source(s): Chart Review       Warfarin doses taken: Reviewed in chart    Diet: unable to assess    New illness, injury, or hospitalization: Yes: patient had noted an increased shortness of breath 11/28, torsemide dose was increased. Torsemide may increase the serum concentration of Warfarin.    Medication/supplement changes: torsemide dose increased on 12/3/22 which may be increasing INR today    Signs or symptoms of bleeding or clotting: No    Previous INR: Therapeutic last 2(+) visits    Additional findings: Cardiology visit today, doesn't look like any med changes noted.       PLAN     Unable to reach Priya today.    Left message to return call to ACC RN.    Follow up required to confirm warfarin dose taken and assess for changes    Amara Villarreal, RN  Anticoagulation Clinic  12/15/2022

## 2022-12-15 NOTE — TELEPHONE ENCOUNTER
Reason for Call:  Other returning call    Detailed comments: Patient returning call from inr nurse    Phone Number Patient can be reached at: Cell number on file:    Telephone Information:   Mobile [phone]       Best Time: Anytime    Can we leave a detailed message on this number? YES    Call taken on 12/15/2022 at 5:05 PM by Alicia Wilkes

## 2022-12-19 DIAGNOSIS — F41.1 GAD (GENERALIZED ANXIETY DISORDER): ICD-10-CM

## 2022-12-20 RX ORDER — ESCITALOPRAM OXALATE 10 MG/1
10 TABLET ORAL DAILY
Qty: 90 TABLET | Refills: 0 | Status: SHIPPED | OUTPATIENT
Start: 2022-12-20 | End: 2023-01-01

## 2023-01-01 ENCOUNTER — LAB (OUTPATIENT)
Dept: LAB | Facility: CLINIC | Age: 83
End: 2023-01-01
Payer: COMMERCIAL

## 2023-01-01 ENCOUNTER — TELEPHONE (OUTPATIENT)
Dept: CARDIOLOGY | Facility: CLINIC | Age: 83
End: 2023-01-01
Payer: COMMERCIAL

## 2023-01-01 ENCOUNTER — ANTICOAGULATION THERAPY VISIT (OUTPATIENT)
Dept: ANTICOAGULATION | Facility: CLINIC | Age: 83
End: 2023-01-01

## 2023-01-01 ENCOUNTER — PREP FOR PROCEDURE (OUTPATIENT)
Dept: OTOLARYNGOLOGY | Facility: CLINIC | Age: 83
End: 2023-01-01

## 2023-01-01 ENCOUNTER — DOCUMENTATION ONLY (OUTPATIENT)
Dept: CARDIOLOGY | Facility: CLINIC | Age: 83
End: 2023-01-01
Payer: COMMERCIAL

## 2023-01-01 ENCOUNTER — ANCILLARY PROCEDURE (OUTPATIENT)
Dept: CARDIOLOGY | Facility: CLINIC | Age: 83
DRG: 228 | End: 2023-01-01
Attending: INTERNAL MEDICINE
Payer: COMMERCIAL

## 2023-01-01 ENCOUNTER — APPOINTMENT (OUTPATIENT)
Dept: SPEECH THERAPY | Facility: CLINIC | Age: 83
DRG: 207 | End: 2023-01-01
Attending: INTERNAL MEDICINE
Payer: COMMERCIAL

## 2023-01-01 ENCOUNTER — HOSPITAL ENCOUNTER (OUTPATIENT)
Facility: HOSPITAL | Age: 83
Discharge: HOME | End: 2023-09-08
Attending: INTERNAL MEDICINE | Admitting: INTERNAL MEDICINE
Payer: COMMERCIAL

## 2023-01-01 ENCOUNTER — APPOINTMENT (OUTPATIENT)
Dept: PHYSICAL THERAPY | Facility: CLINIC | Age: 83
DRG: 003 | End: 2023-01-01
Attending: SURGERY
Payer: COMMERCIAL

## 2023-01-01 ENCOUNTER — PRE VISIT (OUTPATIENT)
Dept: SURGERY | Facility: CLINIC | Age: 83
End: 2023-01-01

## 2023-01-01 ENCOUNTER — APPOINTMENT (OUTPATIENT)
Dept: OCCUPATIONAL THERAPY | Facility: CLINIC | Age: 83
DRG: 003 | End: 2023-01-01
Attending: SURGERY
Payer: COMMERCIAL

## 2023-01-01 ENCOUNTER — APPOINTMENT (OUTPATIENT)
Dept: CT IMAGING | Facility: CLINIC | Age: 83
DRG: 003 | End: 2023-01-01
Attending: STUDENT IN AN ORGANIZED HEALTH CARE EDUCATION/TRAINING PROGRAM
Payer: COMMERCIAL

## 2023-01-01 ENCOUNTER — TELEPHONE (OUTPATIENT)
Dept: INTERNAL MEDICINE | Facility: CLINIC | Age: 83
End: 2023-01-01

## 2023-01-01 ENCOUNTER — APPOINTMENT (OUTPATIENT)
Dept: OCCUPATIONAL THERAPY | Facility: CLINIC | Age: 83
DRG: 207 | End: 2023-01-01
Attending: INTERNAL MEDICINE
Payer: COMMERCIAL

## 2023-01-01 ENCOUNTER — APPOINTMENT (OUTPATIENT)
Dept: PHYSICAL THERAPY | Facility: CLINIC | Age: 83
DRG: 207 | End: 2023-01-01
Attending: INTERNAL MEDICINE
Payer: COMMERCIAL

## 2023-01-01 ENCOUNTER — ANCILLARY PROCEDURE (OUTPATIENT)
Dept: GENERAL RADIOLOGY | Facility: CLINIC | Age: 83
DRG: 207 | End: 2023-01-01
Attending: NURSE PRACTITIONER
Payer: COMMERCIAL

## 2023-01-01 ENCOUNTER — TELEPHONE (OUTPATIENT)
Dept: CARDIOLOGY | Facility: CLINIC | Age: 83
End: 2023-01-01

## 2023-01-01 ENCOUNTER — PATIENT OUTREACH (OUTPATIENT)
Dept: CARE COORDINATION | Facility: CLINIC | Age: 83
End: 2023-01-01
Payer: COMMERCIAL

## 2023-01-01 ENCOUNTER — TELEPHONE (OUTPATIENT)
Dept: ANTICOAGULATION | Facility: CLINIC | Age: 83
End: 2023-01-01
Payer: COMMERCIAL

## 2023-01-01 ENCOUNTER — APPOINTMENT (OUTPATIENT)
Dept: GENERAL RADIOLOGY | Facility: CLINIC | Age: 83
DRG: 003 | End: 2023-01-01
Attending: PHYSICIAN ASSISTANT
Payer: COMMERCIAL

## 2023-01-01 ENCOUNTER — ANESTHESIA EVENT (OUTPATIENT)
Dept: CARDIOLOGY | Facility: CLINIC | Age: 83
DRG: 003 | End: 2023-01-01
Payer: COMMERCIAL

## 2023-01-01 ENCOUNTER — TELEPHONE (OUTPATIENT)
Dept: INTERNAL MEDICINE | Facility: CLINIC | Age: 83
End: 2023-01-01
Payer: COMMERCIAL

## 2023-01-01 ENCOUNTER — HOSPITAL ENCOUNTER (OUTPATIENT)
Dept: CARDIOLOGY | Facility: HOSPITAL | Age: 83
Discharge: HOME | End: 2023-09-08
Attending: INTERNAL MEDICINE | Admitting: INTERNAL MEDICINE
Payer: COMMERCIAL

## 2023-01-01 ENCOUNTER — DOCUMENTATION ONLY (OUTPATIENT)
Dept: OTHER | Facility: CLINIC | Age: 83
End: 2023-01-01

## 2023-01-01 ENCOUNTER — APPOINTMENT (OUTPATIENT)
Dept: GENERAL RADIOLOGY | Facility: CLINIC | Age: 83
DRG: 003 | End: 2023-01-01
Payer: COMMERCIAL

## 2023-01-01 ENCOUNTER — HOSPITAL ENCOUNTER (OUTPATIENT)
Dept: CARDIOLOGY | Facility: CLINIC | Age: 83
Discharge: HOME | End: 2023-05-02
Attending: INTERNAL MEDICINE
Payer: COMMERCIAL

## 2023-01-01 ENCOUNTER — APPOINTMENT (OUTPATIENT)
Dept: SPEECH THERAPY | Facility: CLINIC | Age: 83
DRG: 003 | End: 2023-01-01
Attending: PHYSICIAN ASSISTANT
Payer: COMMERCIAL

## 2023-01-01 ENCOUNTER — HOSPITAL ENCOUNTER (OUTPATIENT)
Facility: HOSPITAL | Age: 83
Discharge: HOME | End: 2023-06-16
Attending: INTERNAL MEDICINE | Admitting: INTERNAL MEDICINE
Payer: COMMERCIAL

## 2023-01-01 ENCOUNTER — TELEPHONE (OUTPATIENT)
Dept: SURGERY | Facility: CLINIC | Age: 83
End: 2023-01-01
Payer: COMMERCIAL

## 2023-01-01 ENCOUNTER — APPOINTMENT (OUTPATIENT)
Dept: GENERAL RADIOLOGY | Facility: CLINIC | Age: 83
DRG: 003 | End: 2023-01-01
Attending: NURSE PRACTITIONER
Payer: COMMERCIAL

## 2023-01-01 ENCOUNTER — OFFICE VISIT (OUTPATIENT)
Dept: CARDIOLOGY | Facility: CLINIC | Age: 83
End: 2023-01-01
Attending: NURSE PRACTITIONER
Payer: COMMERCIAL

## 2023-01-01 ENCOUNTER — ANCILLARY PROCEDURE (OUTPATIENT)
Dept: CARDIOLOGY | Facility: CLINIC | Age: 83
End: 2023-01-01
Attending: INTERNAL MEDICINE
Payer: COMMERCIAL

## 2023-01-01 ENCOUNTER — APPOINTMENT (OUTPATIENT)
Dept: SPEECH THERAPY | Facility: CLINIC | Age: 83
DRG: 207 | End: 2023-01-01
Payer: COMMERCIAL

## 2023-01-01 ENCOUNTER — DOCUMENTATION ONLY (OUTPATIENT)
Dept: CARDIOLOGY | Facility: CLINIC | Age: 83
End: 2023-01-01

## 2023-01-01 ENCOUNTER — APPOINTMENT (OUTPATIENT)
Dept: GENERAL RADIOLOGY | Facility: CLINIC | Age: 83
DRG: 003 | End: 2023-01-01
Attending: SURGERY
Payer: COMMERCIAL

## 2023-01-01 ENCOUNTER — OFFICE VISIT (OUTPATIENT)
Dept: CARDIOLOGY | Facility: CLINIC | Age: 83
End: 2023-01-01
Payer: COMMERCIAL

## 2023-01-01 ENCOUNTER — OFFICE VISIT (OUTPATIENT)
Dept: INTERNAL MEDICINE | Facility: CLINIC | Age: 83
End: 2023-01-01
Payer: COMMERCIAL

## 2023-01-01 ENCOUNTER — NURSE TRIAGE (OUTPATIENT)
Dept: INTERNAL MEDICINE | Facility: CLINIC | Age: 83
End: 2023-01-01

## 2023-01-01 ENCOUNTER — APPOINTMENT (OUTPATIENT)
Dept: SPEECH THERAPY | Facility: CLINIC | Age: 83
DRG: 003 | End: 2023-01-01
Attending: SURGERY
Payer: COMMERCIAL

## 2023-01-01 ENCOUNTER — LAB (OUTPATIENT)
Dept: LAB | Facility: HOSPITAL | Age: 83
End: 2023-01-01
Payer: COMMERCIAL

## 2023-01-01 ENCOUNTER — ANESTHESIA (OUTPATIENT)
Dept: SURGERY | Facility: CLINIC | Age: 83
DRG: 003 | End: 2023-01-01
Payer: COMMERCIAL

## 2023-01-01 ENCOUNTER — TELEPHONE (OUTPATIENT)
Dept: ANTICOAGULATION | Facility: CLINIC | Age: 83
End: 2023-01-01

## 2023-01-01 ENCOUNTER — ANESTHESIA (OUTPATIENT)
Dept: SURGERY | Facility: HOSPITAL | Age: 83
End: 2023-01-01
Payer: COMMERCIAL

## 2023-01-01 ENCOUNTER — MYC MEDICAL ADVICE (OUTPATIENT)
Dept: OTHER | Facility: CLINIC | Age: 83
End: 2023-01-01
Payer: COMMERCIAL

## 2023-01-01 ENCOUNTER — ANESTHESIA EVENT (OUTPATIENT)
Dept: SURGERY | Facility: CLINIC | Age: 83
DRG: 228 | End: 2023-01-01
Payer: COMMERCIAL

## 2023-01-01 ENCOUNTER — HOSPITAL ENCOUNTER (OUTPATIENT)
Dept: CARDIOLOGY | Facility: HOSPITAL | Age: 83
Discharge: HOME | End: 2023-06-16
Attending: INTERNAL MEDICINE
Payer: COMMERCIAL

## 2023-01-01 ENCOUNTER — HOSPITAL ENCOUNTER (INPATIENT)
Facility: CLINIC | Age: 83
LOS: 2 days | Discharge: HOME | DRG: 813 | End: 2023-09-21
Attending: STUDENT IN AN ORGANIZED HEALTH CARE EDUCATION/TRAINING PROGRAM | Admitting: STUDENT IN AN ORGANIZED HEALTH CARE EDUCATION/TRAINING PROGRAM
Payer: COMMERCIAL

## 2023-01-01 ENCOUNTER — ONCOLOGY VISIT (OUTPATIENT)
Dept: ONCOLOGY | Facility: CLINIC | Age: 83
End: 2023-01-01
Attending: PHYSICIAN ASSISTANT
Payer: COMMERCIAL

## 2023-01-01 ENCOUNTER — ANESTHESIA (OUTPATIENT)
Dept: SURGERY | Facility: CLINIC | Age: 83
DRG: 228 | End: 2023-01-01
Payer: COMMERCIAL

## 2023-01-01 ENCOUNTER — APPOINTMENT (OUTPATIENT)
Dept: CARDIOLOGY | Facility: CLINIC | Age: 83
DRG: 003 | End: 2023-01-01
Attending: STUDENT IN AN ORGANIZED HEALTH CARE EDUCATION/TRAINING PROGRAM
Payer: COMMERCIAL

## 2023-01-01 ENCOUNTER — DOCUMENTATION ONLY (OUTPATIENT)
Dept: ANTICOAGULATION | Facility: CLINIC | Age: 83
End: 2023-01-01

## 2023-01-01 ENCOUNTER — HOSPITAL ENCOUNTER (INPATIENT)
Facility: CLINIC | Age: 83
LOS: 30 days | DRG: 207 | End: 2024-01-14
Attending: INTERNAL MEDICINE | Admitting: INTERNAL MEDICINE
Payer: COMMERCIAL

## 2023-01-01 ENCOUNTER — ANESTHESIA EVENT (OUTPATIENT)
Dept: SURGERY | Facility: HOSPITAL | Age: 83
End: 2023-01-01
Payer: COMMERCIAL

## 2023-01-01 ENCOUNTER — APPOINTMENT (OUTPATIENT)
Dept: PHYSICAL THERAPY | Facility: CLINIC | Age: 83
DRG: 003 | End: 2023-01-01
Attending: NURSE PRACTITIONER
Payer: COMMERCIAL

## 2023-01-01 ENCOUNTER — ANCILLARY PROCEDURE (OUTPATIENT)
Dept: CARDIOLOGY | Facility: CLINIC | Age: 83
DRG: 003 | End: 2023-01-01
Attending: SURGERY
Payer: COMMERCIAL

## 2023-01-01 ENCOUNTER — VIRTUAL VISIT (OUTPATIENT)
Dept: SURGERY | Facility: CLINIC | Age: 83
End: 2023-01-01
Payer: COMMERCIAL

## 2023-01-01 ENCOUNTER — HOSPITAL ENCOUNTER (INPATIENT)
Facility: CLINIC | Age: 83
LOS: 17 days | Discharge: NURSING, REHAB OR LONG-TERM CARE | DRG: 003 | End: 2023-12-15
Attending: SURGERY | Admitting: SURGERY
Payer: COMMERCIAL

## 2023-01-01 ENCOUNTER — LAB (OUTPATIENT)
Dept: INFUSION THERAPY | Facility: CLINIC | Age: 83
End: 2023-01-01
Attending: PHYSICIAN ASSISTANT
Payer: COMMERCIAL

## 2023-01-01 ENCOUNTER — ANESTHESIA EVENT (OUTPATIENT)
Dept: SURGERY | Facility: CLINIC | Age: 83
DRG: 003 | End: 2023-01-01
Payer: COMMERCIAL

## 2023-01-01 ENCOUNTER — APPOINTMENT (OUTPATIENT)
Dept: GENERAL RADIOLOGY | Facility: CLINIC | Age: 83
DRG: 228 | End: 2023-01-01
Attending: NURSE PRACTITIONER
Payer: COMMERCIAL

## 2023-01-01 ENCOUNTER — TELEPHONE (OUTPATIENT)
Dept: OTHER | Facility: CLINIC | Age: 83
End: 2023-01-01
Payer: COMMERCIAL

## 2023-01-01 ENCOUNTER — APPOINTMENT (OUTPATIENT)
Dept: GENERAL RADIOLOGY | Facility: CLINIC | Age: 83
DRG: 228 | End: 2023-01-01
Attending: STUDENT IN AN ORGANIZED HEALTH CARE EDUCATION/TRAINING PROGRAM
Payer: COMMERCIAL

## 2023-01-01 ENCOUNTER — HOSPITAL ENCOUNTER (OUTPATIENT)
Facility: CLINIC | Age: 83
Discharge: HOME | End: 2023-08-29
Attending: INTERNAL MEDICINE | Admitting: INTERNAL MEDICINE
Payer: COMMERCIAL

## 2023-01-01 ENCOUNTER — HOSPITAL ENCOUNTER (OUTPATIENT)
Dept: GENERAL RADIOLOGY | Facility: CLINIC | Age: 83
Discharge: HOME | End: 2023-05-02
Attending: INTERNAL MEDICINE
Payer: COMMERCIAL

## 2023-01-01 ENCOUNTER — HOSPITAL ENCOUNTER (OUTPATIENT)
Facility: CLINIC | Age: 83
End: 2023-01-01
Payer: COMMERCIAL

## 2023-01-01 ENCOUNTER — ALLIED HEALTH/NURSE VISIT (OUTPATIENT)
Dept: CARDIOLOGY | Facility: CLINIC | Age: 83
End: 2023-01-01
Payer: COMMERCIAL

## 2023-01-01 ENCOUNTER — VIRTUAL VISIT (OUTPATIENT)
Dept: URGENT CARE | Facility: CLINIC | Age: 83
End: 2023-01-01
Payer: COMMERCIAL

## 2023-01-01 ENCOUNTER — APPOINTMENT (OUTPATIENT)
Dept: GENERAL RADIOLOGY | Facility: CLINIC | Age: 83
DRG: 003 | End: 2023-01-01
Attending: STUDENT IN AN ORGANIZED HEALTH CARE EDUCATION/TRAINING PROGRAM
Payer: COMMERCIAL

## 2023-01-01 ENCOUNTER — PREP FOR PROCEDURE (OUTPATIENT)
Dept: CARDIOLOGY | Facility: CLINIC | Age: 83
End: 2023-01-01
Payer: COMMERCIAL

## 2023-01-01 ENCOUNTER — HOSPITAL ENCOUNTER (OUTPATIENT)
Dept: CT IMAGING | Facility: CLINIC | Age: 83
Discharge: HOME | End: 2023-02-03
Attending: NURSE PRACTITIONER | Admitting: NURSE PRACTITIONER
Payer: COMMERCIAL

## 2023-01-01 ENCOUNTER — OFFICE VISIT (OUTPATIENT)
Dept: SURGERY | Facility: CLINIC | Age: 83
End: 2023-01-01
Payer: COMMERCIAL

## 2023-01-01 ENCOUNTER — TELEPHONE (OUTPATIENT)
Dept: MEDSURG UNIT | Facility: CLINIC | Age: 83
End: 2023-01-01
Payer: COMMERCIAL

## 2023-01-01 ENCOUNTER — HOSPITAL ENCOUNTER (INPATIENT)
Facility: CLINIC | Age: 83
LOS: 1 days | Discharge: HOME | DRG: 228 | End: 2023-08-02
Attending: INTERNAL MEDICINE | Admitting: THORACIC SURGERY (CARDIOTHORACIC VASCULAR SURGERY)
Payer: COMMERCIAL

## 2023-01-01 ENCOUNTER — ALLIED HEALTH/NURSE VISIT (OUTPATIENT)
Dept: CARDIOLOGY | Facility: CLINIC | Age: 83
End: 2023-01-01

## 2023-01-01 ENCOUNTER — PREP FOR PROCEDURE (OUTPATIENT)
Dept: CARDIOLOGY | Facility: CLINIC | Age: 83
End: 2023-01-01

## 2023-01-01 ENCOUNTER — APPOINTMENT (OUTPATIENT)
Dept: CARDIOLOGY | Facility: CLINIC | Age: 83
DRG: 003 | End: 2023-01-01
Attending: PHYSICIAN ASSISTANT
Payer: COMMERCIAL

## 2023-01-01 ENCOUNTER — ANESTHESIA (OUTPATIENT)
Dept: CARDIOLOGY | Facility: CLINIC | Age: 83
DRG: 003 | End: 2023-01-01
Payer: COMMERCIAL

## 2023-01-01 VITALS
TEMPERATURE: 98.6 F | WEIGHT: 153 LBS | HEART RATE: 91 BPM | HEIGHT: 64 IN | RESPIRATION RATE: 16 BRPM | BODY MASS INDEX: 26.12 KG/M2 | DIASTOLIC BLOOD PRESSURE: 81 MMHG | OXYGEN SATURATION: 97 % | SYSTOLIC BLOOD PRESSURE: 121 MMHG

## 2023-01-01 VITALS
DIASTOLIC BLOOD PRESSURE: 62 MMHG | SYSTOLIC BLOOD PRESSURE: 116 MMHG | HEART RATE: 91 BPM | HEIGHT: 63 IN | RESPIRATION RATE: 18 BRPM | WEIGHT: 154 LBS | TEMPERATURE: 96.9 F | OXYGEN SATURATION: 94 % | BODY MASS INDEX: 27.29 KG/M2

## 2023-01-01 VITALS
OXYGEN SATURATION: 97 % | WEIGHT: 161.4 LBS | SYSTOLIC BLOOD PRESSURE: 109 MMHG | TEMPERATURE: 98.5 F | HEART RATE: 79 BPM | BODY MASS INDEX: 27.55 KG/M2 | DIASTOLIC BLOOD PRESSURE: 71 MMHG | HEIGHT: 64 IN | RESPIRATION RATE: 16 BRPM

## 2023-01-01 VITALS
BODY MASS INDEX: 27.61 KG/M2 | HEART RATE: 80 BPM | WEIGHT: 155.8 LBS | HEIGHT: 63 IN | SYSTOLIC BLOOD PRESSURE: 114 MMHG | DIASTOLIC BLOOD PRESSURE: 66 MMHG

## 2023-01-01 VITALS
BODY MASS INDEX: 27.46 KG/M2 | RESPIRATION RATE: 14 BRPM | BODY MASS INDEX: 28.09 KG/M2 | WEIGHT: 153.6 LBS | OXYGEN SATURATION: 99 % | SYSTOLIC BLOOD PRESSURE: 112 MMHG | HEART RATE: 86 BPM | TEMPERATURE: 98.5 F | RESPIRATION RATE: 18 BRPM | WEIGHT: 155 LBS | DIASTOLIC BLOOD PRESSURE: 67 MMHG | SYSTOLIC BLOOD PRESSURE: 102 MMHG | DIASTOLIC BLOOD PRESSURE: 74 MMHG | HEART RATE: 88 BPM

## 2023-01-01 VITALS
HEART RATE: 89 BPM | DIASTOLIC BLOOD PRESSURE: 74 MMHG | BODY MASS INDEX: 27.64 KG/M2 | HEIGHT: 63 IN | WEIGHT: 156 LBS | SYSTOLIC BLOOD PRESSURE: 128 MMHG | RESPIRATION RATE: 16 BRPM | TEMPERATURE: 97.7 F

## 2023-01-01 VITALS
BODY MASS INDEX: 27.31 KG/M2 | DIASTOLIC BLOOD PRESSURE: 71 MMHG | HEART RATE: 83 BPM | OXYGEN SATURATION: 93 % | WEIGHT: 160 LBS | HEIGHT: 64 IN | SYSTOLIC BLOOD PRESSURE: 109 MMHG

## 2023-01-01 VITALS
HEART RATE: 66 BPM | SYSTOLIC BLOOD PRESSURE: 106 MMHG | OXYGEN SATURATION: 94 % | BODY MASS INDEX: 25.95 KG/M2 | TEMPERATURE: 97.7 F | WEIGHT: 152 LBS | RESPIRATION RATE: 12 BRPM | DIASTOLIC BLOOD PRESSURE: 58 MMHG | HEIGHT: 64 IN

## 2023-01-01 VITALS
HEIGHT: 64 IN | HEART RATE: 81 BPM | OXYGEN SATURATION: 97 % | DIASTOLIC BLOOD PRESSURE: 73 MMHG | BODY MASS INDEX: 27.13 KG/M2 | SYSTOLIC BLOOD PRESSURE: 106 MMHG | WEIGHT: 158.9 LBS

## 2023-01-01 VITALS
DIASTOLIC BLOOD PRESSURE: 70 MMHG | BODY MASS INDEX: 28.3 KG/M2 | SYSTOLIC BLOOD PRESSURE: 124 MMHG | HEART RATE: 87 BPM | OXYGEN SATURATION: 97 % | WEIGHT: 153.8 LBS | HEIGHT: 62 IN

## 2023-01-01 VITALS
BODY MASS INDEX: 25.95 KG/M2 | HEIGHT: 64 IN | HEART RATE: 86 BPM | WEIGHT: 152 LBS | OXYGEN SATURATION: 96 % | DIASTOLIC BLOOD PRESSURE: 76 MMHG | SYSTOLIC BLOOD PRESSURE: 128 MMHG

## 2023-01-01 VITALS
SYSTOLIC BLOOD PRESSURE: 108 MMHG | BODY MASS INDEX: 26.31 KG/M2 | DIASTOLIC BLOOD PRESSURE: 73 MMHG | HEIGHT: 64 IN | OXYGEN SATURATION: 97 % | WEIGHT: 154.1 LBS | HEART RATE: 79 BPM

## 2023-01-01 VITALS
WEIGHT: 151.2 LBS | RESPIRATION RATE: 12 BRPM | TEMPERATURE: 97.5 F | OXYGEN SATURATION: 95 % | DIASTOLIC BLOOD PRESSURE: 55 MMHG | BODY MASS INDEX: 26.78 KG/M2 | HEART RATE: 61 BPM | SYSTOLIC BLOOD PRESSURE: 101 MMHG

## 2023-01-01 VITALS
BODY MASS INDEX: 28.25 KG/M2 | RESPIRATION RATE: 14 BRPM | SYSTOLIC BLOOD PRESSURE: 122 MMHG | HEART RATE: 87 BPM | OXYGEN SATURATION: 98 % | HEIGHT: 62 IN | TEMPERATURE: 98.6 F | WEIGHT: 153.5 LBS | DIASTOLIC BLOOD PRESSURE: 62 MMHG

## 2023-01-01 VITALS
HEART RATE: 66 BPM | TEMPERATURE: 97.9 F | BODY MASS INDEX: 27.84 KG/M2 | SYSTOLIC BLOOD PRESSURE: 111 MMHG | HEIGHT: 63 IN | OXYGEN SATURATION: 91 % | WEIGHT: 157.1 LBS | RESPIRATION RATE: 18 BRPM | DIASTOLIC BLOOD PRESSURE: 68 MMHG

## 2023-01-01 VITALS
BODY MASS INDEX: 27.89 KG/M2 | SYSTOLIC BLOOD PRESSURE: 125 MMHG | HEART RATE: 70 BPM | DIASTOLIC BLOOD PRESSURE: 51 MMHG | OXYGEN SATURATION: 100 % | WEIGHT: 163.36 LBS | TEMPERATURE: 97 F | RESPIRATION RATE: 25 BRPM | HEIGHT: 64 IN

## 2023-01-01 VITALS
DIASTOLIC BLOOD PRESSURE: 72 MMHG | TEMPERATURE: 98.5 F | HEART RATE: 86 BPM | SYSTOLIC BLOOD PRESSURE: 116 MMHG | BODY MASS INDEX: 27.78 KG/M2 | WEIGHT: 156.8 LBS | OXYGEN SATURATION: 97 %

## 2023-01-01 VITALS
SYSTOLIC BLOOD PRESSURE: 113 MMHG | BODY MASS INDEX: 27.25 KG/M2 | HEART RATE: 83 BPM | HEIGHT: 64 IN | WEIGHT: 154 LBS | TEMPERATURE: 98.3 F | OXYGEN SATURATION: 97 % | WEIGHT: 153.8 LBS | HEIGHT: 63 IN | BODY MASS INDEX: 26.29 KG/M2 | DIASTOLIC BLOOD PRESSURE: 76 MMHG | SYSTOLIC BLOOD PRESSURE: 118 MMHG | DIASTOLIC BLOOD PRESSURE: 80 MMHG | OXYGEN SATURATION: 98 % | HEART RATE: 87 BPM

## 2023-01-01 VITALS
OXYGEN SATURATION: 94 % | HEIGHT: 63 IN | HEART RATE: 91 BPM | SYSTOLIC BLOOD PRESSURE: 116 MMHG | BODY MASS INDEX: 27.29 KG/M2 | DIASTOLIC BLOOD PRESSURE: 62 MMHG | WEIGHT: 154 LBS | RESPIRATION RATE: 18 BRPM

## 2023-01-01 VITALS
HEART RATE: 85 BPM | WEIGHT: 156.5 LBS | OXYGEN SATURATION: 96 % | SYSTOLIC BLOOD PRESSURE: 124 MMHG | BODY MASS INDEX: 27.29 KG/M2 | DIASTOLIC BLOOD PRESSURE: 85 MMHG

## 2023-01-01 VITALS
BODY MASS INDEX: 27.06 KG/M2 | SYSTOLIC BLOOD PRESSURE: 121 MMHG | DIASTOLIC BLOOD PRESSURE: 80 MMHG | OXYGEN SATURATION: 97 % | WEIGHT: 155.2 LBS | HEART RATE: 82 BPM

## 2023-01-01 VITALS
WEIGHT: 154.6 LBS | DIASTOLIC BLOOD PRESSURE: 70 MMHG | SYSTOLIC BLOOD PRESSURE: 114 MMHG | OXYGEN SATURATION: 90 % | BODY MASS INDEX: 28.28 KG/M2 | RESPIRATION RATE: 18 BRPM | TEMPERATURE: 98 F | HEART RATE: 61 BPM

## 2023-01-01 VITALS
HEART RATE: 78 BPM | DIASTOLIC BLOOD PRESSURE: 73 MMHG | BODY MASS INDEX: 27.84 KG/M2 | WEIGHT: 157.1 LBS | HEIGHT: 63 IN | SYSTOLIC BLOOD PRESSURE: 110 MMHG

## 2023-01-01 VITALS
HEIGHT: 63 IN | RESPIRATION RATE: 18 BRPM | HEART RATE: 60 BPM | DIASTOLIC BLOOD PRESSURE: 50 MMHG | WEIGHT: 154 LBS | TEMPERATURE: 97.8 F | OXYGEN SATURATION: 93 % | BODY MASS INDEX: 27.29 KG/M2 | SYSTOLIC BLOOD PRESSURE: 89 MMHG

## 2023-01-01 DIAGNOSIS — I48.20 CHRONIC ATRIAL FIBRILLATION (H): ICD-10-CM

## 2023-01-01 DIAGNOSIS — I36.1 TRICUSPID VALVE REGURGITATION, NONRHEUMATIC: ICD-10-CM

## 2023-01-01 DIAGNOSIS — I07.1 SEVERE TRICUSPID REGURGITATION: ICD-10-CM

## 2023-01-01 DIAGNOSIS — Z00.6 EXAMINATION OF PARTICIPANT OR CONTROL IN CLINICAL RESEARCH: Primary | ICD-10-CM

## 2023-01-01 DIAGNOSIS — I48.20 CHRONIC ATRIAL FIBRILLATION (H): Primary | ICD-10-CM

## 2023-01-01 DIAGNOSIS — Z79.01 CURRENT USE OF LONG TERM ANTICOAGULATION: ICD-10-CM

## 2023-01-01 DIAGNOSIS — Z95.0 CARDIAC PACEMAKER IN SITU: Primary | ICD-10-CM

## 2023-01-01 DIAGNOSIS — Z13.9 SCREENING PROCEDURE: ICD-10-CM

## 2023-01-01 DIAGNOSIS — Z00.6 EXAMINATION OF PARTICIPANT OR CONTROL IN CLINICAL RESEARCH: ICD-10-CM

## 2023-01-01 DIAGNOSIS — R39.9 LOWER URINARY TRACT SYMPTOMS (LUTS): Primary | ICD-10-CM

## 2023-01-01 DIAGNOSIS — I07.1 SEVERE TRICUSPID REGURGITATION: Primary | ICD-10-CM

## 2023-01-01 DIAGNOSIS — J96.22 ACUTE AND CHRONIC RESPIRATORY FAILURE WITH HYPERCAPNIA (H): ICD-10-CM

## 2023-01-01 DIAGNOSIS — I44.2 COMPLETE HEART BLOCK (H): ICD-10-CM

## 2023-01-01 DIAGNOSIS — I48.19 PERSISTENT ATRIAL FIBRILLATION (H): ICD-10-CM

## 2023-01-01 DIAGNOSIS — Z13.9 SCREENING PROCEDURE: Primary | ICD-10-CM

## 2023-01-01 DIAGNOSIS — Z95.0 CARDIAC PACEMAKER IN SITU: ICD-10-CM

## 2023-01-01 DIAGNOSIS — I36.1 NONRHEUMATIC TRICUSPID (VALVE) INSUFFICIENCY: ICD-10-CM

## 2023-01-01 DIAGNOSIS — I10 BENIGN ESSENTIAL HYPERTENSION: ICD-10-CM

## 2023-01-01 DIAGNOSIS — N18.31 STAGE 3A CHRONIC KIDNEY DISEASE (H): ICD-10-CM

## 2023-01-01 DIAGNOSIS — J96.21 ACUTE AND CHRONIC RESPIRATORY FAILURE WITH HYPOXIA (H): ICD-10-CM

## 2023-01-01 DIAGNOSIS — R93.1 ABNORMAL FINDINGS DIAGNOSTIC IMAGING OF HEART AND CORONARY CIRCULATION: ICD-10-CM

## 2023-01-01 DIAGNOSIS — I07.1 TRICUSPID REGURGITATION: ICD-10-CM

## 2023-01-01 DIAGNOSIS — F41.1 GAD (GENERALIZED ANXIETY DISORDER): ICD-10-CM

## 2023-01-01 DIAGNOSIS — I50.812 CHRONIC RIGHT-SIDED HEART FAILURE (H): ICD-10-CM

## 2023-01-01 DIAGNOSIS — I07.1 TRICUSPID VALVE INSUFFICIENCY, UNSPECIFIED ETIOLOGY: Primary | ICD-10-CM

## 2023-01-01 DIAGNOSIS — N30.00 ACUTE CYSTITIS WITHOUT HEMATURIA: Primary | ICD-10-CM

## 2023-01-01 DIAGNOSIS — I50.32 CHRONIC DIASTOLIC HEART FAILURE (H): ICD-10-CM

## 2023-01-01 DIAGNOSIS — U07.1 SARS-COV-2 POSITIVE: Primary | ICD-10-CM

## 2023-01-01 DIAGNOSIS — I07.1 TRICUSPID VALVE INSUFFICIENCY, UNSPECIFIED ETIOLOGY: ICD-10-CM

## 2023-01-01 DIAGNOSIS — Z00.00 MEDICARE ANNUAL WELLNESS VISIT, SUBSEQUENT: Primary | ICD-10-CM

## 2023-01-01 DIAGNOSIS — T82.120A DISPLACEMENT OF ATRIAL PACEMAKER LEADS, INITIAL ENCOUNTER: Primary | ICD-10-CM

## 2023-01-01 DIAGNOSIS — Z95.0 S/P CARDIAC PACEMAKER PROCEDURE: ICD-10-CM

## 2023-01-01 DIAGNOSIS — I50.32 CHRONIC DIASTOLIC CONGESTIVE HEART FAILURE (H): ICD-10-CM

## 2023-01-01 DIAGNOSIS — Z86.79 HISTORY OF AORTIC DISSECTION: ICD-10-CM

## 2023-01-01 DIAGNOSIS — D69.6 THROMBOCYTOPENIA (H): Primary | ICD-10-CM

## 2023-01-01 DIAGNOSIS — I36.1 TRICUSPID VALVE REGURGITATION, NONRHEUMATIC: Primary | ICD-10-CM

## 2023-01-01 DIAGNOSIS — Z87.440 HISTORY OF UTI: ICD-10-CM

## 2023-01-01 DIAGNOSIS — I48.91 ATRIAL FIBRILLATION (H): ICD-10-CM

## 2023-01-01 DIAGNOSIS — T82.120A DISPLACEMENT OF ATRIAL PACEMAKER LEADS, INITIAL ENCOUNTER: ICD-10-CM

## 2023-01-01 DIAGNOSIS — Z45.02 ENCOUNTER FOR ADJUSTMENT AND MANAGEMENT OF AUTOMATIC IMPLANTABLE CARDIAC DEFIBRILLATOR: ICD-10-CM

## 2023-01-01 DIAGNOSIS — G47.9 TROUBLE IN SLEEPING: ICD-10-CM

## 2023-01-01 DIAGNOSIS — Z01.818 PREOPERATIVE EXAMINATION: Primary | ICD-10-CM

## 2023-01-01 DIAGNOSIS — Z98.890 S/P AORTIC DISSECTION REPAIR: ICD-10-CM

## 2023-01-01 DIAGNOSIS — Z95.4 S/P TRICUSPID VALVE REPLACEMENT: Primary | ICD-10-CM

## 2023-01-01 DIAGNOSIS — I48.21 PERMANENT ATRIAL FIBRILLATION (H): Primary | ICD-10-CM

## 2023-01-01 DIAGNOSIS — I07.1 TRICUSPID VALVE REGURGITATION: Primary | ICD-10-CM

## 2023-01-01 DIAGNOSIS — Z98.890 HX OF ATRIOVENTRICULAR NODE ABLATION: ICD-10-CM

## 2023-01-01 DIAGNOSIS — R23.3 PETECHIAE: Primary | ICD-10-CM

## 2023-01-01 DIAGNOSIS — J96.01 ACUTE RESPIRATORY FAILURE WITH HYPOXIA (H): Primary | ICD-10-CM

## 2023-01-01 DIAGNOSIS — A04.72 C. DIFFICILE COLITIS: ICD-10-CM

## 2023-01-01 DIAGNOSIS — Z01.818 PRE-OP EVALUATION: Primary | ICD-10-CM

## 2023-01-01 DIAGNOSIS — I50.32 CHRONIC DIASTOLIC HEART FAILURE (H): Primary | ICD-10-CM

## 2023-01-01 DIAGNOSIS — I07.1 SEVERE TRICUSPID REGURGITATION BY PRIOR ECHOCARDIOGRAM: Primary | ICD-10-CM

## 2023-01-01 DIAGNOSIS — Z01.818 PREOP EXAMINATION: Primary | ICD-10-CM

## 2023-01-01 DIAGNOSIS — I50.32 CHRONIC DIASTOLIC CONGESTIVE HEART FAILURE (H): Primary | ICD-10-CM

## 2023-01-01 DIAGNOSIS — L98.9 SKIN LESION: ICD-10-CM

## 2023-01-01 DIAGNOSIS — I82.409 DVT (DEEP VENOUS THROMBOSIS) (H): Primary | ICD-10-CM

## 2023-01-01 DIAGNOSIS — T88.4XXA DIFFICULT AIRWAY: ICD-10-CM

## 2023-01-01 DIAGNOSIS — T82.120A ATRIAL PACEMAKER LEAD DISPLACEMENT: ICD-10-CM

## 2023-01-01 DIAGNOSIS — Z98.890 HX OF ATRIOVENTRICULAR NODE ABLATION: Primary | ICD-10-CM

## 2023-01-01 DIAGNOSIS — Z95.4 S/P TVR (TRICUSPID VALVE REPLACEMENT): Primary | ICD-10-CM

## 2023-01-01 DIAGNOSIS — R06.00 DYSPNEA: ICD-10-CM

## 2023-01-01 DIAGNOSIS — N39.0 URINARY TRACT INFECTION WITHOUT HEMATURIA, SITE UNSPECIFIED: ICD-10-CM

## 2023-01-01 DIAGNOSIS — Q21.12 PATENT FORAMEN OVALE: ICD-10-CM

## 2023-01-01 DIAGNOSIS — R93.1 ABNORMAL ECHOCARDIOGRAM: Primary | ICD-10-CM

## 2023-01-01 DIAGNOSIS — T88.4XXS DIFFICULT AIRWAY FOR INTUBATION, SEQUELA: Primary | ICD-10-CM

## 2023-01-01 DIAGNOSIS — D69.3 IDIOPATHIC THROMBOCYTOPENIC PURPURA (H): Primary | ICD-10-CM

## 2023-01-01 DIAGNOSIS — D69.6 THROMBOCYTOPENIA (H): ICD-10-CM

## 2023-01-01 DIAGNOSIS — I07.1 TRICUSPID REGURGITATION: Primary | ICD-10-CM

## 2023-01-01 DIAGNOSIS — R93.1 ABNORMAL ECHOCARDIOGRAM: ICD-10-CM

## 2023-01-01 LAB
1,3 BETA GLUCAN SER-MCNC: 40 PG/ML
ABO/RH(D): NORMAL
ACANTHOCYTES BLD QL SMEAR: ABNORMAL
ALBUMIN SERPL BCG-MCNC: 2.7 G/DL (ref 3.5–5.2)
ALBUMIN SERPL BCG-MCNC: 2.7 G/DL (ref 3.5–5.2)
ALBUMIN SERPL BCG-MCNC: 2.8 G/DL (ref 3.5–5.2)
ALBUMIN SERPL BCG-MCNC: 2.9 G/DL (ref 3.5–5.2)
ALBUMIN SERPL BCG-MCNC: 3 G/DL (ref 3.5–5.2)
ALBUMIN SERPL BCG-MCNC: 3.1 G/DL (ref 3.5–5.2)
ALBUMIN SERPL BCG-MCNC: 3.2 G/DL (ref 3.5–5.2)
ALBUMIN SERPL BCG-MCNC: 3.3 G/DL (ref 3.5–5.2)
ALBUMIN SERPL BCG-MCNC: 3.4 G/DL (ref 3.5–5.2)
ALBUMIN SERPL BCG-MCNC: 3.4 G/DL (ref 3.5–5.2)
ALBUMIN SERPL BCG-MCNC: 3.5 G/DL (ref 3.5–5.2)
ALBUMIN SERPL BCG-MCNC: 3.9 G/DL (ref 3.5–5.2)
ALBUMIN SERPL BCG-MCNC: 3.9 G/DL (ref 3.5–5.2)
ALBUMIN SERPL BCG-MCNC: 4 G/DL (ref 3.5–5.2)
ALBUMIN SERPL BCG-MCNC: 4.1 G/DL (ref 3.5–5.2)
ALBUMIN SERPL BCG-MCNC: 4.2 G/DL (ref 3.5–5.2)
ALBUMIN UR-MCNC: 10 MG/DL
ALBUMIN UR-MCNC: 20 MG/DL
ALBUMIN UR-MCNC: NEGATIVE MG/DL
ALLEN'S TEST: ABNORMAL
ALLEN'S TEST: YES
ALLEN'S TEST: YES
ALP SERPL-CCNC: 102 U/L (ref 40–150)
ALP SERPL-CCNC: 104 U/L (ref 40–150)
ALP SERPL-CCNC: 110 U/L (ref 40–150)
ALP SERPL-CCNC: 115 U/L (ref 35–104)
ALP SERPL-CCNC: 118 U/L (ref 35–104)
ALP SERPL-CCNC: 118 U/L (ref 40–150)
ALP SERPL-CCNC: 122 U/L (ref 40–150)
ALP SERPL-CCNC: 127 U/L (ref 35–104)
ALP SERPL-CCNC: 128 U/L (ref 40–150)
ALP SERPL-CCNC: 131 U/L (ref 40–150)
ALP SERPL-CCNC: 140 U/L (ref 40–150)
ALP SERPL-CCNC: 141 U/L (ref 40–150)
ALP SERPL-CCNC: 144 U/L (ref 40–150)
ALP SERPL-CCNC: 144 U/L (ref 40–150)
ALP SERPL-CCNC: 153 U/L (ref 40–150)
ALP SERPL-CCNC: 154 U/L (ref 40–150)
ALP SERPL-CCNC: 65 U/L (ref 40–150)
ALP SERPL-CCNC: 65 U/L (ref 40–150)
ALP SERPL-CCNC: 66 U/L (ref 40–150)
ALP SERPL-CCNC: 68 U/L (ref 40–150)
ALP SERPL-CCNC: 70 U/L (ref 40–150)
ALP SERPL-CCNC: 78 U/L (ref 40–150)
ALP SERPL-CCNC: 79 U/L (ref 40–150)
ALP SERPL-CCNC: 80 U/L (ref 40–150)
ALP SERPL-CCNC: 80 U/L (ref 40–150)
ALP SERPL-CCNC: 81 U/L (ref 40–150)
ALP SERPL-CCNC: 84 U/L (ref 40–150)
ALP SERPL-CCNC: 86 U/L (ref 40–150)
ALP SERPL-CCNC: 87 U/L (ref 40–150)
ALP SERPL-CCNC: 88 U/L (ref 40–150)
ALP SERPL-CCNC: 90 U/L (ref 40–150)
ALP SERPL-CCNC: 95 U/L (ref 40–150)
ALP SERPL-CCNC: 95 U/L (ref 40–150)
ALP SERPL-CCNC: 97 U/L (ref 35–104)
ALP SERPL-CCNC: 98 U/L (ref 40–150)
ALT SERPL W P-5'-P-CCNC: 10 U/L (ref 0–50)
ALT SERPL W P-5'-P-CCNC: 11 U/L (ref 0–50)
ALT SERPL W P-5'-P-CCNC: 12 U/L (ref 0–50)
ALT SERPL W P-5'-P-CCNC: 14 U/L (ref 0–50)
ALT SERPL W P-5'-P-CCNC: 16 U/L (ref 0–50)
ALT SERPL W P-5'-P-CCNC: 17 U/L (ref 0–50)
ALT SERPL W P-5'-P-CCNC: 18 U/L (ref 0–50)
ALT SERPL W P-5'-P-CCNC: 19 U/L (ref 0–50)
ALT SERPL W P-5'-P-CCNC: 20 U/L (ref 0–50)
ALT SERPL W P-5'-P-CCNC: 21 U/L (ref 0–50)
ALT SERPL W P-5'-P-CCNC: 22 U/L (ref 0–50)
ALT SERPL W P-5'-P-CCNC: 22 U/L (ref 0–50)
ALT SERPL W P-5'-P-CCNC: 23 U/L (ref 0–50)
ALT SERPL W P-5'-P-CCNC: 24 U/L (ref 0–50)
ALT SERPL W P-5'-P-CCNC: 24 U/L (ref 0–50)
ALT SERPL W P-5'-P-CCNC: 32 U/L (ref 0–50)
ALT SERPL W P-5'-P-CCNC: 32 U/L (ref 0–50)
ALT SERPL W P-5'-P-CCNC: 34 U/L (ref 0–50)
ALT SERPL W P-5'-P-CCNC: 36 U/L (ref 0–50)
ALT SERPL W P-5'-P-CCNC: 39 U/L (ref 0–50)
ALT SERPL W P-5'-P-CCNC: 47 U/L (ref 0–50)
ALT SERPL W P-5'-P-CCNC: 50 U/L (ref 0–50)
ALT SERPL W P-5'-P-CCNC: 9 U/L (ref 0–50)
ALT SERPL W P-5'-P-CCNC: 9 U/L (ref 0–50)
ANION GAP SERPL CALCULATED.3IONS-SCNC: 10 MMOL/L (ref 7–15)
ANION GAP SERPL CALCULATED.3IONS-SCNC: 11 MMOL/L (ref 7–15)
ANION GAP SERPL CALCULATED.3IONS-SCNC: 12 MMOL/L (ref 7–15)
ANION GAP SERPL CALCULATED.3IONS-SCNC: 13 MMOL/L (ref 7–15)
ANION GAP SERPL CALCULATED.3IONS-SCNC: 14 MMOL/L (ref 7–15)
ANION GAP SERPL CALCULATED.3IONS-SCNC: 15 MMOL/L (ref 7–15)
ANION GAP SERPL CALCULATED.3IONS-SCNC: 16 MMOL/L (ref 7–15)
ANION GAP SERPL CALCULATED.3IONS-SCNC: 16 MMOL/L (ref 7–15)
ANION GAP SERPL CALCULATED.3IONS-SCNC: 17 MMOL/L (ref 7–15)
ANION GAP SERPL CALCULATED.3IONS-SCNC: 18 MMOL/L (ref 7–15)
ANION GAP SERPL CALCULATED.3IONS-SCNC: 18 MMOL/L (ref 7–15)
ANION GAP SERPL CALCULATED.3IONS-SCNC: 19 MMOL/L (ref 7–15)
ANION GAP SERPL CALCULATED.3IONS-SCNC: 20 MMOL/L (ref 7–15)
ANION GAP SERPL CALCULATED.3IONS-SCNC: 4 MMOL/L (ref 7–15)
ANION GAP SERPL CALCULATED.3IONS-SCNC: 5 MMOL/L (ref 7–15)
ANION GAP SERPL CALCULATED.3IONS-SCNC: 7 MMOL/L (ref 7–15)
ANION GAP SERPL CALCULATED.3IONS-SCNC: 8 MMOL/L (ref 7–15)
ANION GAP SERPL CALCULATED.3IONS-SCNC: 9 MMOL/L (ref 7–15)
ANTIBODY SCREEN: NEGATIVE
APPEARANCE UR: ABNORMAL
APPEARANCE UR: CLEAR
APTT PPP: 28 SECONDS (ref 22–38)
APTT PPP: 30 SECONDS (ref 22–38)
APTT PPP: 34 SECONDS (ref 22–38)
APTT PPP: 34 SECONDS (ref 22–38)
APTT PPP: 36 SECONDS (ref 22–38)
APTT PPP: 83 SECONDS (ref 22–38)
AST SERPL W P-5'-P-CCNC: 19 U/L (ref 0–45)
AST SERPL W P-5'-P-CCNC: 20 U/L (ref 0–45)
AST SERPL W P-5'-P-CCNC: 22 U/L (ref 0–45)
AST SERPL W P-5'-P-CCNC: 23 U/L (ref 0–45)
AST SERPL W P-5'-P-CCNC: 23 U/L (ref 0–45)
AST SERPL W P-5'-P-CCNC: 24 U/L (ref 0–45)
AST SERPL W P-5'-P-CCNC: 25 U/L (ref 0–45)
AST SERPL W P-5'-P-CCNC: 26 U/L (ref 0–45)
AST SERPL W P-5'-P-CCNC: 27 U/L (ref 0–45)
AST SERPL W P-5'-P-CCNC: 27 U/L (ref 0–45)
AST SERPL W P-5'-P-CCNC: 29 U/L (ref 0–45)
AST SERPL W P-5'-P-CCNC: 29 U/L (ref 0–45)
AST SERPL W P-5'-P-CCNC: 31 U/L (ref 0–45)
AST SERPL W P-5'-P-CCNC: 32 U/L (ref 0–45)
AST SERPL W P-5'-P-CCNC: 32 U/L (ref 0–45)
AST SERPL W P-5'-P-CCNC: 34 U/L (ref 0–45)
AST SERPL W P-5'-P-CCNC: 38 U/L (ref 0–45)
AST SERPL W P-5'-P-CCNC: 39 U/L (ref 0–45)
AST SERPL W P-5'-P-CCNC: 40 U/L (ref 0–45)
AST SERPL W P-5'-P-CCNC: 43 U/L (ref 0–45)
AST SERPL W P-5'-P-CCNC: 47 U/L (ref 0–45)
AST SERPL W P-5'-P-CCNC: 48 U/L (ref 0–45)
AST SERPL W P-5'-P-CCNC: 50 U/L (ref 0–45)
AST SERPL W P-5'-P-CCNC: 57 U/L (ref 0–45)
AST SERPL W P-5'-P-CCNC: 69 U/L (ref 0–45)
AST SERPL W P-5'-P-CCNC: 86 U/L (ref 0–45)
AST SERPL W P-5'-P-CCNC: ABNORMAL U/L
ATRIAL RATE - MUSE: 220 BPM
ATRIAL RATE - MUSE: 234 BPM
ATRIAL RATE - MUSE: 234 BPM
ATRIAL RATE - MUSE: 240 BPM
ATRIAL RATE - MUSE: 249 BPM
ATRIAL RATE - MUSE: 283 BPM
ATRIAL RATE - MUSE: 81 BPM
AUER BODIES BLD QL SMEAR: ABNORMAL
BACTERIA #/AREA URNS HPF: ABNORMAL /HPF
BACTERIA #/AREA URNS HPF: ABNORMAL /HPF
BACTERIA BLD CULT: NO GROWTH
BACTERIA BRONCH: ABNORMAL
BACTERIA SPEC CULT: NORMAL
BACTERIA SPT CULT: ABNORMAL
BACTERIA UR CULT: ABNORMAL
BACTERIA UR CULT: ABNORMAL
BACTERIA UR CULT: NO GROWTH
BACTERIA UR CULT: NORMAL
BASE EXCESS BLDA CALC-SCNC: -0.4 MMOL/L (ref -9–1.8)
BASE EXCESS BLDA CALC-SCNC: -0.9 MMOL/L (ref -9–1.8)
BASE EXCESS BLDA CALC-SCNC: -1.6 MMOL/L (ref -9–1.8)
BASE EXCESS BLDA CALC-SCNC: -2 MMOL/L (ref -9–1.8)
BASE EXCESS BLDA CALC-SCNC: -2 MMOL/L (ref -9–1.8)
BASE EXCESS BLDA CALC-SCNC: -2.6 MMOL/L (ref -9–1.8)
BASE EXCESS BLDA CALC-SCNC: -2.7 MMOL/L (ref -9–1.8)
BASE EXCESS BLDA CALC-SCNC: -2.8 MMOL/L (ref -9–1.8)
BASE EXCESS BLDA CALC-SCNC: -3 MMOL/L (ref -9–1.8)
BASE EXCESS BLDA CALC-SCNC: -3.4 MMOL/L (ref -9–1.8)
BASE EXCESS BLDA CALC-SCNC: -3.6 MMOL/L (ref -9–1.8)
BASE EXCESS BLDA CALC-SCNC: -3.7 MMOL/L (ref -9–1.8)
BASE EXCESS BLDA CALC-SCNC: -3.9 MMOL/L (ref -9–1.8)
BASE EXCESS BLDA CALC-SCNC: -4 MMOL/L (ref -9–1.8)
BASE EXCESS BLDA CALC-SCNC: -4 MMOL/L (ref -9–1.8)
BASE EXCESS BLDA CALC-SCNC: -4.2 MMOL/L (ref -9–1.8)
BASE EXCESS BLDA CALC-SCNC: 0 MMOL/L (ref -9–1.8)
BASE EXCESS BLDA CALC-SCNC: 0.3 MMOL/L (ref -9–1.8)
BASE EXCESS BLDA CALC-SCNC: 1.1 MMOL/L (ref -9–1.8)
BASE EXCESS BLDA CALC-SCNC: 1.3 MMOL/L (ref -9–1.8)
BASE EXCESS BLDA CALC-SCNC: 11.4 MMOL/L (ref -9–1.8)
BASE EXCESS BLDA CALC-SCNC: 2 MMOL/L (ref -9.6–2)
BASE EXCESS BLDA CALC-SCNC: 3.7 MMOL/L (ref -9.6–2)
BASE EXCESS BLDA CALC-SCNC: 3.9 MMOL/L (ref -9–1.8)
BASE EXCESS BLDA CALC-SCNC: 4.5 MMOL/L (ref -9.6–2)
BASE EXCESS BLDA CALC-SCNC: 4.7 MMOL/L (ref -9.6–2)
BASE EXCESS BLDA CALC-SCNC: 5.1 MMOL/L (ref -9.6–2)
BASE EXCESS BLDA CALC-SCNC: 5.3 MMOL/L (ref -9.6–2)
BASE EXCESS BLDA CALC-SCNC: 5.7 MMOL/L (ref -9.6–2)
BASE EXCESS BLDA CALC-SCNC: 6.1 MMOL/L (ref -9–1.8)
BASE EXCESS BLDA CALC-SCNC: 6.4 MMOL/L (ref -9–1.8)
BASE EXCESS BLDA CALC-SCNC: 7.1 MMOL/L (ref -9–1.8)
BASE EXCESS BLDA CALC-SCNC: 7.7 MMOL/L (ref -9–1.8)
BASE EXCESS BLDA CALC-SCNC: 8 MMOL/L (ref -9–1.8)
BASE EXCESS BLDA CALC-SCNC: 9.6 MMOL/L (ref -9–1.8)
BASE EXCESS BLDV CALC-SCNC: -0.8 MMOL/L (ref -7.7–1.9)
BASE EXCESS BLDV CALC-SCNC: -1.3 MMOL/L (ref -7.7–1.9)
BASE EXCESS BLDV CALC-SCNC: -1.5 MMOL/L (ref -7.7–1.9)
BASE EXCESS BLDV CALC-SCNC: -1.5 MMOL/L (ref -7.7–1.9)
BASE EXCESS BLDV CALC-SCNC: -1.8 MMOL/L (ref -7.7–1.9)
BASE EXCESS BLDV CALC-SCNC: -3.2 MMOL/L (ref -7.7–1.9)
BASE EXCESS BLDV CALC-SCNC: 11.1 MMOL/L
BASE EXCESS BLDV CALC-SCNC: 11.7 MMOL/L
BASE EXCESS BLDV CALC-SCNC: 11.9 MMOL/L
BASE EXCESS BLDV CALC-SCNC: 13.6 MMOL/L
BASE EXCESS BLDV CALC-SCNC: 2.4 MMOL/L (ref -7.7–1.9)
BASE EXCESS BLDV CALC-SCNC: 2.9 MMOL/L (ref -7.7–1.9)
BASE EXCESS BLDV CALC-SCNC: 2.9 MMOL/L (ref -7.7–1.9)
BASE EXCESS BLDV CALC-SCNC: 3.5 MMOL/L (ref -7.7–1.9)
BASE EXCESS BLDV CALC-SCNC: 4.5 MMOL/L (ref -7.7–1.9)
BASE EXCESS BLDV CALC-SCNC: 5 MMOL/L (ref -7.7–1.9)
BASE EXCESS BLDV CALC-SCNC: 5.2 MMOL/L (ref -7.7–1.9)
BASE EXCESS BLDV CALC-SCNC: 6.6 MMOL/L (ref -8.1–1.9)
BASO STIPL BLD QL SMEAR: ABNORMAL
BASOPHILS # BLD AUTO: 0 10E3/UL (ref 0–0.2)
BASOPHILS # BLD AUTO: 0.1 10E3/UL (ref 0–0.2)
BASOPHILS NFR BLD AUTO: 0 %
BASOPHILS NFR BLD AUTO: 0 %
BASOPHILS NFR BLD AUTO: 1 %
BASOPHILS NFR BLD AUTO: 1 %
BILIRUB DIRECT SERPL-MCNC: 0.38 MG/DL (ref 0–0.3)
BILIRUB DIRECT SERPL-MCNC: <0.2 MG/DL (ref 0–0.3)
BILIRUB DIRECT SERPL-MCNC: <0.2 MG/DL (ref 0–0.3)
BILIRUB SERPL-MCNC: 0.3 MG/DL
BILIRUB SERPL-MCNC: 0.4 MG/DL
BILIRUB SERPL-MCNC: 0.5 MG/DL
BILIRUB SERPL-MCNC: 0.6 MG/DL
BILIRUB SERPL-MCNC: 0.7 MG/DL
BILIRUB SERPL-MCNC: 0.8 MG/DL
BILIRUB SERPL-MCNC: 0.9 MG/DL
BILIRUB SERPL-MCNC: 1.2 MG/DL
BILIRUB SERPL-MCNC: 1.5 MG/DL
BILIRUB SERPL-MCNC: 1.7 MG/DL
BILIRUB UR QL STRIP: NEGATIVE
BITE CELLS BLD QL SMEAR: ABNORMAL
BLD PROD TYP BPU: NORMAL
BLISTER CELLS BLD QL SMEAR: ABNORMAL
BLOOD COMPONENT TYPE: NORMAL
BUN SERPL-MCNC: 20.5 MG/DL (ref 8–23)
BUN SERPL-MCNC: 21.4 MG/DL (ref 8–23)
BUN SERPL-MCNC: 24.7 MG/DL (ref 8–23)
BUN SERPL-MCNC: 25 MG/DL (ref 8–23)
BUN SERPL-MCNC: 25 MG/DL (ref 8–23)
BUN SERPL-MCNC: 25.3 MG/DL (ref 8–23)
BUN SERPL-MCNC: 27.3 MG/DL (ref 8–23)
BUN SERPL-MCNC: 27.4 MG/DL (ref 8–23)
BUN SERPL-MCNC: 27.6 MG/DL (ref 8–23)
BUN SERPL-MCNC: 28 MG/DL (ref 8–23)
BUN SERPL-MCNC: 28.4 MG/DL (ref 8–23)
BUN SERPL-MCNC: 28.5 MG/DL (ref 8–23)
BUN SERPL-MCNC: 28.8 MG/DL (ref 8–23)
BUN SERPL-MCNC: 29.2 MG/DL (ref 8–23)
BUN SERPL-MCNC: 30.1 MG/DL (ref 8–23)
BUN SERPL-MCNC: 30.6 MG/DL (ref 8–23)
BUN SERPL-MCNC: 30.9 MG/DL (ref 8–23)
BUN SERPL-MCNC: 30.9 MG/DL (ref 8–23)
BUN SERPL-MCNC: 31 MG/DL (ref 8–23)
BUN SERPL-MCNC: 31.6 MG/DL (ref 8–23)
BUN SERPL-MCNC: 32.5 MG/DL (ref 8–23)
BUN SERPL-MCNC: 33 MG/DL (ref 8–23)
BUN SERPL-MCNC: 33.5 MG/DL (ref 8–23)
BUN SERPL-MCNC: 34.4 MG/DL (ref 8–23)
BUN SERPL-MCNC: 34.6 MG/DL (ref 8–23)
BUN SERPL-MCNC: 36.1 MG/DL (ref 8–23)
BUN SERPL-MCNC: 36.2 MG/DL (ref 8–23)
BUN SERPL-MCNC: 36.4 MG/DL (ref 8–23)
BUN SERPL-MCNC: 36.8 MG/DL (ref 8–23)
BUN SERPL-MCNC: 37.2 MG/DL (ref 8–23)
BUN SERPL-MCNC: 37.3 MG/DL (ref 8–23)
BUN SERPL-MCNC: 37.5 MG/DL (ref 8–23)
BUN SERPL-MCNC: 37.6 MG/DL (ref 8–23)
BUN SERPL-MCNC: 38.5 MG/DL (ref 8–23)
BUN SERPL-MCNC: 39.5 MG/DL (ref 8–23)
BUN SERPL-MCNC: 39.7 MG/DL (ref 8–23)
BUN SERPL-MCNC: 39.8 MG/DL (ref 8–23)
BUN SERPL-MCNC: 40 MG/DL (ref 8–23)
BUN SERPL-MCNC: 40.1 MG/DL (ref 8–23)
BUN SERPL-MCNC: 40.9 MG/DL (ref 8–23)
BUN SERPL-MCNC: 41.2 MG/DL (ref 8–23)
BUN SERPL-MCNC: 41.2 MG/DL (ref 8–23)
BUN SERPL-MCNC: 42.1 MG/DL (ref 8–23)
BUN SERPL-MCNC: 43 MG/DL (ref 8–23)
BUN SERPL-MCNC: 43 MG/DL (ref 8–23)
BUN SERPL-MCNC: 43.1 MG/DL (ref 8–23)
BUN SERPL-MCNC: 43.7 MG/DL (ref 8–23)
BUN SERPL-MCNC: 44.9 MG/DL (ref 8–23)
BUN SERPL-MCNC: 45.5 MG/DL (ref 8–23)
BUN SERPL-MCNC: 49.4 MG/DL (ref 8–23)
BUN SERPL-MCNC: 49.4 MG/DL (ref 8–23)
BUN SERPL-MCNC: 49.8 MG/DL (ref 8–23)
BUN SERPL-MCNC: 50 MG/DL (ref 8–23)
BUN SERPL-MCNC: 51.2 MG/DL (ref 8–23)
BUN SERPL-MCNC: 52.5 MG/DL (ref 8–23)
BUN SERPL-MCNC: 52.9 MG/DL (ref 8–23)
BUN SERPL-MCNC: 53.8 MG/DL (ref 8–23)
BUN SERPL-MCNC: 54.3 MG/DL (ref 8–23)
BUN SERPL-MCNC: 54.3 MG/DL (ref 8–23)
BUN SERPL-MCNC: 55.5 MG/DL (ref 8–23)
BUN SERPL-MCNC: 57.8 MG/DL (ref 8–23)
BURR CELLS BLD QL SMEAR: SLIGHT
C DIFF GDH STL QL IA: NEGATIVE
C DIFF GDH STL QL IA: NEGATIVE
C DIFF TOX A+B STL QL IA: NEGATIVE
C DIFF TOX A+B STL QL IA: NEGATIVE
C DIFF TOX B STL QL: POSITIVE
C DIFF TOX B STL QL: POSITIVE
C PNEUM DNA SPEC QL NAA+PROBE: NOT DETECTED
CA-I BLD-MCNC: 1.19 MMOL/L (ref 1.11–1.3)
CA-I BLD-MCNC: 3.8 MG/DL (ref 4.4–5.2)
CA-I BLD-MCNC: 3.9 MG/DL (ref 4.4–5.2)
CA-I BLD-MCNC: 4 MG/DL (ref 4.4–5.2)
CA-I BLD-MCNC: 4.1 MG/DL (ref 4.4–5.2)
CA-I BLD-MCNC: 4.4 MG/DL (ref 4.4–5.2)
CA-I BLD-MCNC: 4.4 MG/DL (ref 4.4–5.2)
CA-I BLD-MCNC: 4.5 MG/DL (ref 4.4–5.2)
CA-I BLD-MCNC: 4.7 MG/DL (ref 4.4–5.2)
CA-I BLD-MCNC: 4.8 MG/DL (ref 4.4–5.2)
CA-I BLD-MCNC: 4.8 MG/DL (ref 4.4–5.2)
CA-I BLD-MCNC: 4.9 MG/DL (ref 4.4–5.2)
CA-I BLD-MCNC: 5 MG/DL (ref 4.4–5.2)
CALCIUM SERPL-MCNC: 5.4 MG/DL (ref 8.8–10.2)
CALCIUM SERPL-MCNC: 6.4 MG/DL (ref 8.8–10.2)
CALCIUM SERPL-MCNC: 7.2 MG/DL (ref 8.8–10.2)
CALCIUM SERPL-MCNC: 8.1 MG/DL (ref 8.8–10.2)
CALCIUM SERPL-MCNC: 8.2 MG/DL (ref 8.8–10.2)
CALCIUM SERPL-MCNC: 8.3 MG/DL (ref 8.8–10.2)
CALCIUM SERPL-MCNC: 8.4 MG/DL (ref 8.8–10.2)
CALCIUM SERPL-MCNC: 8.5 MG/DL (ref 8.8–10.2)
CALCIUM SERPL-MCNC: 8.6 MG/DL (ref 8.8–10.2)
CALCIUM SERPL-MCNC: 8.7 MG/DL (ref 8.8–10.2)
CALCIUM SERPL-MCNC: 8.8 MG/DL (ref 8.8–10.2)
CALCIUM SERPL-MCNC: 8.9 MG/DL (ref 8.8–10.2)
CALCIUM SERPL-MCNC: 9 MG/DL (ref 8.8–10.2)
CALCIUM SERPL-MCNC: 9.1 MG/DL (ref 8.8–10.2)
CALCIUM SERPL-MCNC: 9.2 MG/DL (ref 8.8–10.2)
CALCIUM SERPL-MCNC: 9.2 MG/DL (ref 8.8–10.2)
CALCIUM SERPL-MCNC: 9.3 MG/DL (ref 8.8–10.2)
CALCIUM SERPL-MCNC: 9.4 MG/DL (ref 8.8–10.2)
CALCIUM SERPL-MCNC: 9.5 MG/DL (ref 8.8–10.2)
CALCIUM SERPL-MCNC: 9.6 MG/DL (ref 8.8–10.2)
CALCIUM SERPL-MCNC: 9.7 MG/DL (ref 8.8–10.2)
CALCIUM SERPL-MCNC: 9.7 MG/DL (ref 8.8–10.2)
CHLORIDE SERPL-SCNC: 100 MMOL/L (ref 98–107)
CHLORIDE SERPL-SCNC: 101 MMOL/L (ref 98–107)
CHLORIDE SERPL-SCNC: 102 MMOL/L (ref 98–107)
CHLORIDE SERPL-SCNC: 102 MMOL/L (ref 98–107)
CHLORIDE SERPL-SCNC: 103 MMOL/L (ref 98–107)
CHLORIDE SERPL-SCNC: 104 MMOL/L (ref 98–107)
CHLORIDE SERPL-SCNC: 104 MMOL/L (ref 98–107)
CHLORIDE SERPL-SCNC: 105 MMOL/L (ref 98–107)
CHLORIDE SERPL-SCNC: 106 MMOL/L (ref 98–107)
CHLORIDE SERPL-SCNC: 107 MMOL/L (ref 98–107)
CHLORIDE SERPL-SCNC: 108 MMOL/L (ref 98–107)
CHLORIDE SERPL-SCNC: 108 MMOL/L (ref 98–107)
CHLORIDE SERPL-SCNC: 109 MMOL/L (ref 98–107)
CHLORIDE SERPL-SCNC: 110 MMOL/L (ref 98–107)
CHLORIDE SERPL-SCNC: 111 MMOL/L (ref 98–107)
CHLORIDE SERPL-SCNC: 113 MMOL/L (ref 98–107)
CHLORIDE SERPL-SCNC: 116 MMOL/L (ref 98–107)
CHLORIDE SERPL-SCNC: 120 MMOL/L (ref 98–107)
CHLORIDE SERPL-SCNC: 94 MMOL/L (ref 98–107)
CHLORIDE SERPL-SCNC: 95 MMOL/L (ref 98–107)
CHLORIDE SERPL-SCNC: 96 MMOL/L (ref 98–107)
CHLORIDE SERPL-SCNC: 97 MMOL/L (ref 98–107)
CHLORIDE SERPL-SCNC: 98 MMOL/L (ref 98–107)
CHLORIDE SERPL-SCNC: 99 MMOL/L (ref 98–107)
CK BB CFR SERPL ELPH: 0 %
CK MACRO1 CFR SERPL: 0 %
CK MACRO2 CFR SERPL: 0 %
CK MB CFR SERPL ELPH: 0 %
CK MB SERPL-MCNC: 3.1 NG/ML
CK MM CFR SERPL ELPH: 100 %
CK SERPL-CCNC: 40 U/L
CK SERPL-CCNC: 40 U/L (ref 26–192)
CK SERPL-CCNC: 92 U/L (ref 26–192)
CLOT INIT KAOL IND TO POST HEP NEUT TRTO: 1 {RATIO}
CLOT INIT KAOL IND TO POST HEP NEUT TRTO: 1 {RATIO}
CLOT INIT KAOLIN IND BLD US: 110 SEC (ref 113–166)
CLOT INIT KAOLIN IND BLD US: 125 SEC (ref 113–166)
CLOT INIT KAOLIN IND P HEP NEUT BLD US: 111 SEC (ref 103–153)
CLOT INIT KAOLIN IND P HEP NEUT BLD US: 130 SEC (ref 103–153)
CLOT STIFF PLT CONT BLD CALC: 26.1 HPA (ref 11.9–29.8)
CLOT STIFF PLT CONT BLD CALC: 26.2 HPA (ref 11.9–29.8)
CLOT STIFF TF IND P HEP NEUT BLD US: 30.1 HPA (ref 13–33.2)
CLOT STIFF TF IND P HEP NEUT BLD US: 30.5 HPA (ref 13–33.2)
CLOT STIFF TF IND+IIB-IIIA INH P HEP NEU: 3.9 HPA (ref 1–3.7)
CLOT STIFF TF IND+IIB-IIIA INH P HEP NEU: 4.4 HPA (ref 1–3.7)
CODING SYSTEM: NORMAL
COHGB MFR BLD: 86 % (ref 92–100)
COLOR UR AUTO: ABNORMAL
COLOR UR AUTO: YELLOW
CPB POCT: NO
CREAT SERPL-MCNC: 0.74 MG/DL (ref 0.51–0.95)
CREAT SERPL-MCNC: 0.76 MG/DL (ref 0.51–0.95)
CREAT SERPL-MCNC: 0.79 MG/DL (ref 0.51–0.95)
CREAT SERPL-MCNC: 0.81 MG/DL (ref 0.51–0.95)
CREAT SERPL-MCNC: 0.81 MG/DL (ref 0.51–0.95)
CREAT SERPL-MCNC: 0.82 MG/DL (ref 0.51–0.95)
CREAT SERPL-MCNC: 0.82 MG/DL (ref 0.51–0.95)
CREAT SERPL-MCNC: 0.84 MG/DL (ref 0.51–0.95)
CREAT SERPL-MCNC: 0.86 MG/DL (ref 0.51–0.95)
CREAT SERPL-MCNC: 0.86 MG/DL (ref 0.51–0.95)
CREAT SERPL-MCNC: 0.88 MG/DL (ref 0.51–0.95)
CREAT SERPL-MCNC: 0.89 MG/DL (ref 0.51–0.95)
CREAT SERPL-MCNC: 0.89 MG/DL (ref 0.51–0.95)
CREAT SERPL-MCNC: 0.9 MG/DL (ref 0.51–0.95)
CREAT SERPL-MCNC: 0.94 MG/DL (ref 0.51–0.95)
CREAT SERPL-MCNC: 0.96 MG/DL (ref 0.51–0.95)
CREAT SERPL-MCNC: 0.99 MG/DL (ref 0.51–0.95)
CREAT SERPL-MCNC: 1.01 MG/DL (ref 0.51–0.95)
CREAT SERPL-MCNC: 1.02 MG/DL (ref 0.51–0.95)
CREAT SERPL-MCNC: 1.03 MG/DL (ref 0.51–0.95)
CREAT SERPL-MCNC: 1.03 MG/DL (ref 0.51–0.95)
CREAT SERPL-MCNC: 1.04 MG/DL (ref 0.51–0.95)
CREAT SERPL-MCNC: 1.06 MG/DL (ref 0.51–0.95)
CREAT SERPL-MCNC: 1.11 MG/DL (ref 0.51–0.95)
CREAT SERPL-MCNC: 1.11 MG/DL (ref 0.51–0.95)
CREAT SERPL-MCNC: 1.12 MG/DL (ref 0.51–0.95)
CREAT SERPL-MCNC: 1.13 MG/DL (ref 0.51–0.95)
CREAT SERPL-MCNC: 1.18 MG/DL (ref 0.51–0.95)
CREAT SERPL-MCNC: 1.18 MG/DL (ref 0.51–0.95)
CREAT SERPL-MCNC: 1.19 MG/DL (ref 0.51–0.95)
CREAT SERPL-MCNC: 1.22 MG/DL (ref 0.51–0.95)
CREAT SERPL-MCNC: 1.24 MG/DL (ref 0.51–0.95)
CREAT SERPL-MCNC: 1.26 MG/DL (ref 0.51–0.95)
CREAT SERPL-MCNC: 1.29 MG/DL (ref 0.51–0.95)
CREAT SERPL-MCNC: 1.3 MG/DL (ref 0.51–0.95)
CREAT SERPL-MCNC: 1.31 MG/DL (ref 0.51–0.95)
CREAT SERPL-MCNC: 1.33 MG/DL (ref 0.51–0.95)
CREAT SERPL-MCNC: 1.34 MG/DL (ref 0.51–0.95)
CREAT SERPL-MCNC: 1.35 MG/DL (ref 0.51–0.95)
CREAT SERPL-MCNC: 1.37 MG/DL (ref 0.51–0.95)
CREAT SERPL-MCNC: 1.37 MG/DL (ref 0.51–0.95)
CREAT SERPL-MCNC: 1.38 MG/DL (ref 0.51–0.95)
CREAT SERPL-MCNC: 1.39 MG/DL (ref 0.51–0.95)
CREAT SERPL-MCNC: 1.42 MG/DL (ref 0.51–0.95)
CREAT SERPL-MCNC: 1.42 MG/DL (ref 0.51–0.95)
CREAT SERPL-MCNC: 1.44 MG/DL (ref 0.51–0.95)
CREAT SERPL-MCNC: 1.5 MG/DL (ref 0.51–0.95)
CREAT SERPL-MCNC: 1.57 MG/DL (ref 0.51–0.95)
CREAT SERPL-MCNC: 1.57 MG/DL (ref 0.51–0.95)
CREAT SERPL-MCNC: 1.59 MG/DL (ref 0.51–0.95)
CREAT SERPL-MCNC: 1.67 MG/DL (ref 0.51–0.95)
CREAT SERPL-MCNC: 1.68 MG/DL (ref 0.51–0.95)
CREAT SERPL-MCNC: 1.7 MG/DL (ref 0.51–0.95)
CREAT SERPL-MCNC: 1.7 MG/DL (ref 0.51–0.95)
CREAT SERPL-MCNC: 1.71 MG/DL (ref 0.51–0.95)
CREAT SERPL-MCNC: 1.73 MG/DL (ref 0.51–0.95)
CREAT SERPL-MCNC: 1.8 MG/DL (ref 0.51–0.95)
CREAT SERPL-MCNC: 1.83 MG/DL (ref 0.51–0.95)
CREAT SERPL-MCNC: 1.89 MG/DL (ref 0.51–0.95)
CROSSMATCH: NORMAL
CRP SERPL-MCNC: 73.4 MG/L
DACRYOCYTES BLD QL SMEAR: ABNORMAL
DEPRECATED HCO3 PLAS-SCNC: 13 MMOL/L (ref 22–29)
DEPRECATED HCO3 PLAS-SCNC: 16 MMOL/L (ref 22–29)
DEPRECATED HCO3 PLAS-SCNC: 18 MMOL/L (ref 22–29)
DEPRECATED HCO3 PLAS-SCNC: 19 MMOL/L (ref 22–29)
DEPRECATED HCO3 PLAS-SCNC: 20 MMOL/L (ref 22–29)
DEPRECATED HCO3 PLAS-SCNC: 21 MMOL/L (ref 22–29)
DEPRECATED HCO3 PLAS-SCNC: 22 MMOL/L (ref 22–29)
DEPRECATED HCO3 PLAS-SCNC: 24 MMOL/L (ref 22–29)
DEPRECATED HCO3 PLAS-SCNC: 25 MMOL/L (ref 22–29)
DEPRECATED HCO3 PLAS-SCNC: 26 MMOL/L (ref 22–29)
DEPRECATED HCO3 PLAS-SCNC: 27 MMOL/L (ref 22–29)
DEPRECATED HCO3 PLAS-SCNC: 27 MMOL/L (ref 22–29)
DEPRECATED HCO3 PLAS-SCNC: 28 MMOL/L (ref 22–29)
DEPRECATED HCO3 PLAS-SCNC: 29 MMOL/L (ref 22–29)
DEPRECATED HCO3 PLAS-SCNC: 30 MMOL/L (ref 22–29)
DEPRECATED HCO3 PLAS-SCNC: 31 MMOL/L (ref 22–29)
DEPRECATED HCO3 PLAS-SCNC: 32 MMOL/L (ref 22–29)
DEPRECATED HCO3 PLAS-SCNC: 32 MMOL/L (ref 22–29)
DEPRECATED HCO3 PLAS-SCNC: 33 MMOL/L (ref 22–29)
DEPRECATED HCO3 PLAS-SCNC: 34 MMOL/L (ref 22–29)
DEPRECATED HCO3 PLAS-SCNC: 35 MMOL/L (ref 22–29)
DEPRECATED HCO3 PLAS-SCNC: 36 MMOL/L (ref 22–29)
DEPRECATED HCO3 PLAS-SCNC: 37 MMOL/L (ref 22–29)
DEPRECATED HCO3 PLAS-SCNC: 38 MMOL/L (ref 22–29)
DEPRECATED HCO3 PLAS-SCNC: 38 MMOL/L (ref 22–29)
DEPRECATED HCO3 PLAS-SCNC: 39 MMOL/L (ref 22–29)
DIASTOLIC BLOOD PRESSURE - MUSE: NORMAL MMHG
EGFRCR SERPLBLD CKD-EPI 2021: 26 ML/MIN/1.73M2
EGFRCR SERPLBLD CKD-EPI 2021: 27 ML/MIN/1.73M2
EGFRCR SERPLBLD CKD-EPI 2021: 28 ML/MIN/1.73M2
EGFRCR SERPLBLD CKD-EPI 2021: 29 ML/MIN/1.73M2
EGFRCR SERPLBLD CKD-EPI 2021: 30 ML/MIN/1.73M2
EGFRCR SERPLBLD CKD-EPI 2021: 32 ML/MIN/1.73M2
EGFRCR SERPLBLD CKD-EPI 2021: 33 ML/MIN/1.73M2
EGFRCR SERPLBLD CKD-EPI 2021: 33 ML/MIN/1.73M2
EGFRCR SERPLBLD CKD-EPI 2021: 35 ML/MIN/1.73M2
EGFRCR SERPLBLD CKD-EPI 2021: 36 ML/MIN/1.73M2
EGFRCR SERPLBLD CKD-EPI 2021: 37 ML/MIN/1.73M2
EGFRCR SERPLBLD CKD-EPI 2021: 37 ML/MIN/1.73M2
EGFRCR SERPLBLD CKD-EPI 2021: 38 ML/MIN/1.73M2
EGFRCR SERPLBLD CKD-EPI 2021: 39 ML/MIN/1.73M2
EGFRCR SERPLBLD CKD-EPI 2021: 39 ML/MIN/1.73M2
EGFRCR SERPLBLD CKD-EPI 2021: 41 ML/MIN/1.73M2
EGFRCR SERPLBLD CKD-EPI 2021: 43 ML/MIN/1.73M2
EGFRCR SERPLBLD CKD-EPI 2021: 44 ML/MIN/1.73M2
EGFRCR SERPLBLD CKD-EPI 2021: 44 ML/MIN/1.73M2
EGFRCR SERPLBLD CKD-EPI 2021: 46 ML/MIN/1.73M2
EGFRCR SERPLBLD CKD-EPI 2021: 46 ML/MIN/1.73M2
EGFRCR SERPLBLD CKD-EPI 2021: 49 ML/MIN/1.73M2
EGFRCR SERPLBLD CKD-EPI 2021: 49 ML/MIN/1.73M2
EGFRCR SERPLBLD CKD-EPI 2021: 50 ML/MIN/1.73M2
EGFRCR SERPLBLD CKD-EPI 2021: 50 ML/MIN/1.73M2
EGFRCR SERPLBLD CKD-EPI 2021: 53 ML/MIN/1.73M2
EGFRCR SERPLBLD CKD-EPI 2021: 54 ML/MIN/1.73M2
EGFRCR SERPLBLD CKD-EPI 2021: 55 ML/MIN/1.73M2
EGFRCR SERPLBLD CKD-EPI 2021: 56 ML/MIN/1.73M2
EGFRCR SERPLBLD CKD-EPI 2021: 57 ML/MIN/1.73M2
EGFRCR SERPLBLD CKD-EPI 2021: 59 ML/MIN/1.73M2
EGFRCR SERPLBLD CKD-EPI 2021: 61 ML/MIN/1.73M2
EGFRCR SERPLBLD CKD-EPI 2021: 64 ML/MIN/1.73M2
EGFRCR SERPLBLD CKD-EPI 2021: 65 ML/MIN/1.73M2
EGFRCR SERPLBLD CKD-EPI 2021: 65 ML/MIN/1.73M2
EGFRCR SERPLBLD CKD-EPI 2021: 66 ML/MIN/1.73M2
EGFRCR SERPLBLD CKD-EPI 2021: 67 ML/MIN/1.73M2
EGFRCR SERPLBLD CKD-EPI 2021: 67 ML/MIN/1.73M2
EGFRCR SERPLBLD CKD-EPI 2021: 69 ML/MIN/1.73M2
EGFRCR SERPLBLD CKD-EPI 2021: 71 ML/MIN/1.73M2
EGFRCR SERPLBLD CKD-EPI 2021: 71 ML/MIN/1.73M2
EGFRCR SERPLBLD CKD-EPI 2021: 73 ML/MIN/1.73M2
EGFRCR SERPLBLD CKD-EPI 2021: 73 ML/MIN/1.73M2
EGFRCR SERPLBLD CKD-EPI 2021: 75 ML/MIN/1.73M2
EGFRCR SERPLBLD CKD-EPI 2021: 78 ML/MIN/1.73M2
EGFRCR SERPLBLD CKD-EPI 2021: 81 ML/MIN/1.73M2
ELLIPTOCYTES BLD QL SMEAR: ABNORMAL
EOSINOPHIL # BLD AUTO: 0 10E3/UL (ref 0–0.7)
EOSINOPHIL # BLD AUTO: 0.1 10E3/UL (ref 0–0.7)
EOSINOPHIL # BLD AUTO: 0.1 10E3/UL (ref 0–0.7)
EOSINOPHIL # BLD AUTO: 0.4 10E3/UL (ref 0–0.7)
EOSINOPHIL NFR BLD AUTO: 0 %
EOSINOPHIL NFR BLD AUTO: 0 %
EOSINOPHIL NFR BLD AUTO: 2 %
EOSINOPHIL NFR BLD AUTO: 6 %
ERYTHROCYTE [DISTWIDTH] IN BLOOD BY AUTOMATED COUNT: 13.8 % (ref 10–15)
ERYTHROCYTE [DISTWIDTH] IN BLOOD BY AUTOMATED COUNT: 14.1 % (ref 10–15)
ERYTHROCYTE [DISTWIDTH] IN BLOOD BY AUTOMATED COUNT: 14.2 % (ref 10–15)
ERYTHROCYTE [DISTWIDTH] IN BLOOD BY AUTOMATED COUNT: 14.2 % (ref 10–15)
ERYTHROCYTE [DISTWIDTH] IN BLOOD BY AUTOMATED COUNT: 14.3 % (ref 10–15)
ERYTHROCYTE [DISTWIDTH] IN BLOOD BY AUTOMATED COUNT: 14.4 % (ref 10–15)
ERYTHROCYTE [DISTWIDTH] IN BLOOD BY AUTOMATED COUNT: 14.5 % (ref 10–15)
ERYTHROCYTE [DISTWIDTH] IN BLOOD BY AUTOMATED COUNT: 14.6 % (ref 10–15)
ERYTHROCYTE [DISTWIDTH] IN BLOOD BY AUTOMATED COUNT: 14.6 % (ref 10–15)
ERYTHROCYTE [DISTWIDTH] IN BLOOD BY AUTOMATED COUNT: 14.9 % (ref 10–15)
ERYTHROCYTE [DISTWIDTH] IN BLOOD BY AUTOMATED COUNT: 15 % (ref 10–15)
ERYTHROCYTE [DISTWIDTH] IN BLOOD BY AUTOMATED COUNT: 15.2 % (ref 10–15)
ERYTHROCYTE [DISTWIDTH] IN BLOOD BY AUTOMATED COUNT: 15.2 % (ref 10–15)
ERYTHROCYTE [DISTWIDTH] IN BLOOD BY AUTOMATED COUNT: 15.3 % (ref 10–15)
ERYTHROCYTE [DISTWIDTH] IN BLOOD BY AUTOMATED COUNT: 15.3 % (ref 10–15)
ERYTHROCYTE [DISTWIDTH] IN BLOOD BY AUTOMATED COUNT: 15.4 % (ref 10–15)
ERYTHROCYTE [DISTWIDTH] IN BLOOD BY AUTOMATED COUNT: 15.5 % (ref 10–15)
ERYTHROCYTE [DISTWIDTH] IN BLOOD BY AUTOMATED COUNT: 15.5 % (ref 10–15)
ERYTHROCYTE [DISTWIDTH] IN BLOOD BY AUTOMATED COUNT: 15.6 % (ref 10–15)
ERYTHROCYTE [DISTWIDTH] IN BLOOD BY AUTOMATED COUNT: 15.7 % (ref 10–15)
ERYTHROCYTE [DISTWIDTH] IN BLOOD BY AUTOMATED COUNT: 15.7 % (ref 10–15)
ERYTHROCYTE [DISTWIDTH] IN BLOOD BY AUTOMATED COUNT: 15.8 % (ref 10–15)
ERYTHROCYTE [DISTWIDTH] IN BLOOD BY AUTOMATED COUNT: 15.9 % (ref 10–15)
ERYTHROCYTE [DISTWIDTH] IN BLOOD BY AUTOMATED COUNT: 16.2 % (ref 10–15)
ERYTHROCYTE [DISTWIDTH] IN BLOOD BY AUTOMATED COUNT: 16.8 % (ref 10–15)
ERYTHROCYTE [DISTWIDTH] IN BLOOD BY AUTOMATED COUNT: 17.2 % (ref 10–15)
ERYTHROCYTE [DISTWIDTH] IN BLOOD BY AUTOMATED COUNT: 17.2 % (ref 10–15)
ERYTHROCYTE [DISTWIDTH] IN BLOOD BY AUTOMATED COUNT: 17.3 % (ref 10–15)
ERYTHROCYTE [DISTWIDTH] IN BLOOD BY AUTOMATED COUNT: 17.8 % (ref 10–15)
ERYTHROCYTE [DISTWIDTH] IN BLOOD BY AUTOMATED COUNT: 17.9 % (ref 10–15)
ERYTHROCYTE [DISTWIDTH] IN BLOOD BY AUTOMATED COUNT: 18 % (ref 10–15)
ERYTHROCYTE [DISTWIDTH] IN BLOOD BY AUTOMATED COUNT: 18.1 % (ref 10–15)
ERYTHROCYTE [DISTWIDTH] IN BLOOD BY AUTOMATED COUNT: 18.1 % (ref 10–15)
ERYTHROCYTE [DISTWIDTH] IN BLOOD BY AUTOMATED COUNT: 18.2 % (ref 10–15)
ERYTHROCYTE [DISTWIDTH] IN BLOOD BY AUTOMATED COUNT: 18.2 % (ref 10–15)
ERYTHROCYTE [DISTWIDTH] IN BLOOD BY AUTOMATED COUNT: 18.3 % (ref 10–15)
FIBRINOGEN PPP-MCNC: 336 MG/DL (ref 170–490)
FIBRINOGEN PPP-MCNC: 432 MG/DL (ref 170–490)
FLUAV H1 2009 PAND RNA SPEC QL NAA+PROBE: NOT DETECTED
FLUAV H1 RNA SPEC QL NAA+PROBE: NOT DETECTED
FLUAV H3 RNA SPEC QL NAA+PROBE: NOT DETECTED
FLUAV RNA SPEC QL NAA+PROBE: NEGATIVE
FLUAV RNA SPEC QL NAA+PROBE: NOT DETECTED
FLUBV RNA RESP QL NAA+PROBE: NEGATIVE
FLUBV RNA SPEC QL NAA+PROBE: NOT DETECTED
FRAGMENTS BLD QL SMEAR: ABNORMAL
GFR SERPL CREATININE-BSD FRML MDRD: 38 ML/MIN/1.73M2
GFR SERPL CREATININE-BSD FRML MDRD: 39 ML/MIN/1.73M2
GFR SERPL CREATININE-BSD FRML MDRD: 40 ML/MIN/1.73M2
GFR SERPL CREATININE-BSD FRML MDRD: 40 ML/MIN/1.73M2
GFR SERPL CREATININE-BSD FRML MDRD: 44 ML/MIN/1.73M2
GFR SERPL CREATININE-BSD FRML MDRD: 45 ML/MIN/1.73M2
GFR SERPL CREATININE-BSD FRML MDRD: 46 ML/MIN/1.73M2
GGT SERPL-CCNC: 20 U/L (ref 5–36)
GGT SERPL-CCNC: 30 U/L (ref 5–36)
GLUCOSE BLD-MCNC: 105 MG/DL (ref 70–99)
GLUCOSE BLD-MCNC: 106 MG/DL (ref 70–99)
GLUCOSE BLD-MCNC: 107 MG/DL (ref 70–125)
GLUCOSE BLD-MCNC: 107 MG/DL (ref 70–99)
GLUCOSE BLD-MCNC: 117 MG/DL (ref 70–125)
GLUCOSE BLD-MCNC: 127 MG/DL (ref 70–99)
GLUCOSE BLD-MCNC: 130 MG/DL (ref 70–99)
GLUCOSE BLD-MCNC: 131 MG/DL (ref 70–99)
GLUCOSE BLD-MCNC: 150 MG/DL (ref 70–99)
GLUCOSE BLD-MCNC: 157 MG/DL (ref 70–99)
GLUCOSE BLD-MCNC: 167 MG/DL (ref 70–125)
GLUCOSE BLD-MCNC: 174 MG/DL (ref 70–99)
GLUCOSE BLDC GLUCOMTR-MCNC: 100 MG/DL (ref 70–99)
GLUCOSE BLDC GLUCOMTR-MCNC: 101 MG/DL (ref 70–99)
GLUCOSE BLDC GLUCOMTR-MCNC: 101 MG/DL (ref 70–99)
GLUCOSE BLDC GLUCOMTR-MCNC: 102 MG/DL (ref 70–99)
GLUCOSE BLDC GLUCOMTR-MCNC: 102 MG/DL (ref 70–99)
GLUCOSE BLDC GLUCOMTR-MCNC: 103 MG/DL (ref 70–99)
GLUCOSE BLDC GLUCOMTR-MCNC: 103 MG/DL (ref 70–99)
GLUCOSE BLDC GLUCOMTR-MCNC: 104 MG/DL (ref 70–99)
GLUCOSE BLDC GLUCOMTR-MCNC: 105 MG/DL (ref 70–99)
GLUCOSE BLDC GLUCOMTR-MCNC: 106 MG/DL (ref 70–99)
GLUCOSE BLDC GLUCOMTR-MCNC: 107 MG/DL (ref 70–99)
GLUCOSE BLDC GLUCOMTR-MCNC: 108 MG/DL (ref 70–99)
GLUCOSE BLDC GLUCOMTR-MCNC: 110 MG/DL (ref 70–99)
GLUCOSE BLDC GLUCOMTR-MCNC: 110 MG/DL (ref 70–99)
GLUCOSE BLDC GLUCOMTR-MCNC: 111 MG/DL (ref 70–99)
GLUCOSE BLDC GLUCOMTR-MCNC: 112 MG/DL (ref 70–99)
GLUCOSE BLDC GLUCOMTR-MCNC: 112 MG/DL (ref 70–99)
GLUCOSE BLDC GLUCOMTR-MCNC: 114 MG/DL (ref 70–99)
GLUCOSE BLDC GLUCOMTR-MCNC: 115 MG/DL (ref 70–99)
GLUCOSE BLDC GLUCOMTR-MCNC: 116 MG/DL (ref 70–99)
GLUCOSE BLDC GLUCOMTR-MCNC: 117 MG/DL (ref 70–99)
GLUCOSE BLDC GLUCOMTR-MCNC: 118 MG/DL (ref 70–99)
GLUCOSE BLDC GLUCOMTR-MCNC: 118 MG/DL (ref 70–99)
GLUCOSE BLDC GLUCOMTR-MCNC: 120 MG/DL (ref 70–99)
GLUCOSE BLDC GLUCOMTR-MCNC: 122 MG/DL (ref 70–99)
GLUCOSE BLDC GLUCOMTR-MCNC: 123 MG/DL (ref 70–99)
GLUCOSE BLDC GLUCOMTR-MCNC: 123 MG/DL (ref 70–99)
GLUCOSE BLDC GLUCOMTR-MCNC: 124 MG/DL (ref 70–99)
GLUCOSE BLDC GLUCOMTR-MCNC: 124 MG/DL (ref 70–99)
GLUCOSE BLDC GLUCOMTR-MCNC: 125 MG/DL (ref 70–99)
GLUCOSE BLDC GLUCOMTR-MCNC: 125 MG/DL (ref 70–99)
GLUCOSE BLDC GLUCOMTR-MCNC: 126 MG/DL (ref 70–99)
GLUCOSE BLDC GLUCOMTR-MCNC: 127 MG/DL (ref 70–99)
GLUCOSE BLDC GLUCOMTR-MCNC: 128 MG/DL (ref 70–99)
GLUCOSE BLDC GLUCOMTR-MCNC: 130 MG/DL (ref 70–99)
GLUCOSE BLDC GLUCOMTR-MCNC: 130 MG/DL (ref 70–99)
GLUCOSE BLDC GLUCOMTR-MCNC: 131 MG/DL (ref 70–99)
GLUCOSE BLDC GLUCOMTR-MCNC: 133 MG/DL (ref 70–99)
GLUCOSE BLDC GLUCOMTR-MCNC: 133 MG/DL (ref 70–99)
GLUCOSE BLDC GLUCOMTR-MCNC: 135 MG/DL (ref 70–99)
GLUCOSE BLDC GLUCOMTR-MCNC: 136 MG/DL (ref 70–99)
GLUCOSE BLDC GLUCOMTR-MCNC: 136 MG/DL (ref 70–99)
GLUCOSE BLDC GLUCOMTR-MCNC: 138 MG/DL (ref 70–99)
GLUCOSE BLDC GLUCOMTR-MCNC: 139 MG/DL (ref 70–99)
GLUCOSE BLDC GLUCOMTR-MCNC: 140 MG/DL (ref 70–99)
GLUCOSE BLDC GLUCOMTR-MCNC: 140 MG/DL (ref 70–99)
GLUCOSE BLDC GLUCOMTR-MCNC: 141 MG/DL (ref 70–99)
GLUCOSE BLDC GLUCOMTR-MCNC: 143 MG/DL (ref 70–99)
GLUCOSE BLDC GLUCOMTR-MCNC: 144 MG/DL (ref 70–99)
GLUCOSE BLDC GLUCOMTR-MCNC: 144 MG/DL (ref 70–99)
GLUCOSE BLDC GLUCOMTR-MCNC: 148 MG/DL (ref 70–99)
GLUCOSE BLDC GLUCOMTR-MCNC: 149 MG/DL (ref 70–99)
GLUCOSE BLDC GLUCOMTR-MCNC: 150 MG/DL (ref 70–99)
GLUCOSE BLDC GLUCOMTR-MCNC: 151 MG/DL (ref 70–99)
GLUCOSE BLDC GLUCOMTR-MCNC: 151 MG/DL (ref 70–99)
GLUCOSE BLDC GLUCOMTR-MCNC: 158 MG/DL (ref 70–99)
GLUCOSE BLDC GLUCOMTR-MCNC: 160 MG/DL (ref 70–99)
GLUCOSE BLDC GLUCOMTR-MCNC: 169 MG/DL (ref 70–99)
GLUCOSE BLDC GLUCOMTR-MCNC: 173 MG/DL (ref 70–99)
GLUCOSE BLDC GLUCOMTR-MCNC: 173 MG/DL (ref 70–99)
GLUCOSE BLDC GLUCOMTR-MCNC: 174 MG/DL (ref 70–99)
GLUCOSE BLDC GLUCOMTR-MCNC: 175 MG/DL (ref 70–99)
GLUCOSE BLDC GLUCOMTR-MCNC: 194 MG/DL (ref 70–99)
GLUCOSE BLDC GLUCOMTR-MCNC: 204 MG/DL (ref 70–99)
GLUCOSE BLDC GLUCOMTR-MCNC: 217 MG/DL (ref 70–99)
GLUCOSE BLDC GLUCOMTR-MCNC: 230 MG/DL (ref 70–99)
GLUCOSE BLDC GLUCOMTR-MCNC: 80 MG/DL (ref 70–99)
GLUCOSE BLDC GLUCOMTR-MCNC: 81 MG/DL (ref 70–99)
GLUCOSE BLDC GLUCOMTR-MCNC: 90 MG/DL (ref 70–99)
GLUCOSE BLDC GLUCOMTR-MCNC: 91 MG/DL (ref 70–99)
GLUCOSE BLDC GLUCOMTR-MCNC: 93 MG/DL (ref 70–99)
GLUCOSE BLDC GLUCOMTR-MCNC: 93 MG/DL (ref 70–99)
GLUCOSE BLDC GLUCOMTR-MCNC: 95 MG/DL (ref 70–99)
GLUCOSE BLDC GLUCOMTR-MCNC: 95 MG/DL (ref 70–99)
GLUCOSE BLDC GLUCOMTR-MCNC: 96 MG/DL (ref 70–99)
GLUCOSE BLDC GLUCOMTR-MCNC: 97 MG/DL (ref 70–99)
GLUCOSE BLDC GLUCOMTR-MCNC: 98 MG/DL (ref 70–99)
GLUCOSE BLDC GLUCOMTR-MCNC: 99 MG/DL (ref 70–99)
GLUCOSE SERPL-MCNC: 101 MG/DL (ref 70–99)
GLUCOSE SERPL-MCNC: 102 MG/DL (ref 70–99)
GLUCOSE SERPL-MCNC: 104 MG/DL (ref 70–99)
GLUCOSE SERPL-MCNC: 104 MG/DL (ref 70–99)
GLUCOSE SERPL-MCNC: 109 MG/DL (ref 70–99)
GLUCOSE SERPL-MCNC: 109 MG/DL (ref 70–99)
GLUCOSE SERPL-MCNC: 110 MG/DL (ref 70–99)
GLUCOSE SERPL-MCNC: 110 MG/DL (ref 70–99)
GLUCOSE SERPL-MCNC: 112 MG/DL (ref 70–99)
GLUCOSE SERPL-MCNC: 112 MG/DL (ref 70–99)
GLUCOSE SERPL-MCNC: 114 MG/DL (ref 70–99)
GLUCOSE SERPL-MCNC: 116 MG/DL (ref 70–99)
GLUCOSE SERPL-MCNC: 116 MG/DL (ref 70–99)
GLUCOSE SERPL-MCNC: 117 MG/DL (ref 70–99)
GLUCOSE SERPL-MCNC: 117 MG/DL (ref 70–99)
GLUCOSE SERPL-MCNC: 118 MG/DL (ref 70–99)
GLUCOSE SERPL-MCNC: 119 MG/DL (ref 70–99)
GLUCOSE SERPL-MCNC: 120 MG/DL (ref 70–99)
GLUCOSE SERPL-MCNC: 124 MG/DL (ref 70–99)
GLUCOSE SERPL-MCNC: 125 MG/DL (ref 70–99)
GLUCOSE SERPL-MCNC: 125 MG/DL (ref 70–99)
GLUCOSE SERPL-MCNC: 126 MG/DL (ref 70–99)
GLUCOSE SERPL-MCNC: 127 MG/DL (ref 70–99)
GLUCOSE SERPL-MCNC: 128 MG/DL (ref 70–99)
GLUCOSE SERPL-MCNC: 128 MG/DL (ref 70–99)
GLUCOSE SERPL-MCNC: 130 MG/DL (ref 70–99)
GLUCOSE SERPL-MCNC: 130 MG/DL (ref 70–99)
GLUCOSE SERPL-MCNC: 131 MG/DL (ref 70–99)
GLUCOSE SERPL-MCNC: 135 MG/DL (ref 70–99)
GLUCOSE SERPL-MCNC: 139 MG/DL (ref 70–99)
GLUCOSE SERPL-MCNC: 140 MG/DL (ref 70–99)
GLUCOSE SERPL-MCNC: 140 MG/DL (ref 70–99)
GLUCOSE SERPL-MCNC: 144 MG/DL (ref 70–99)
GLUCOSE SERPL-MCNC: 145 MG/DL (ref 70–99)
GLUCOSE SERPL-MCNC: 145 MG/DL (ref 70–99)
GLUCOSE SERPL-MCNC: 147 MG/DL (ref 70–99)
GLUCOSE SERPL-MCNC: 154 MG/DL (ref 70–99)
GLUCOSE SERPL-MCNC: 154 MG/DL (ref 70–99)
GLUCOSE SERPL-MCNC: 157 MG/DL (ref 70–99)
GLUCOSE SERPL-MCNC: 159 MG/DL (ref 70–99)
GLUCOSE SERPL-MCNC: 168 MG/DL (ref 70–99)
GLUCOSE SERPL-MCNC: 181 MG/DL (ref 70–99)
GLUCOSE SERPL-MCNC: 184 MG/DL (ref 70–99)
GLUCOSE SERPL-MCNC: 214 MG/DL (ref 70–99)
GLUCOSE SERPL-MCNC: 72 MG/DL (ref 70–99)
GLUCOSE SERPL-MCNC: 76 MG/DL (ref 70–99)
GLUCOSE SERPL-MCNC: 82 MG/DL (ref 70–99)
GLUCOSE SERPL-MCNC: 83 MG/DL (ref 70–99)
GLUCOSE SERPL-MCNC: 85 MG/DL (ref 70–99)
GLUCOSE SERPL-MCNC: 86 MG/DL (ref 70–99)
GLUCOSE SERPL-MCNC: 90 MG/DL (ref 70–99)
GLUCOSE SERPL-MCNC: 90 MG/DL (ref 70–99)
GLUCOSE SERPL-MCNC: 91 MG/DL (ref 70–99)
GLUCOSE SERPL-MCNC: 94 MG/DL (ref 70–99)
GLUCOSE SERPL-MCNC: 94 MG/DL (ref 70–99)
GLUCOSE SERPL-MCNC: 97 MG/DL (ref 70–99)
GLUCOSE SERPL-MCNC: 98 MG/DL (ref 70–99)
GLUCOSE SERPL-MCNC: 99 MG/DL (ref 70–99)
GLUCOSE SERPL-MCNC: 99 MG/DL (ref 70–99)
GLUCOSE UR STRIP-MCNC: 200 MG/DL
GLUCOSE UR STRIP-MCNC: 500 MG/DL
GLUCOSE UR STRIP-MCNC: NEGATIVE MG/DL
GRAM STAIN RESULT: ABNORMAL
HADV DNA SPEC QL NAA+PROBE: NOT DETECTED
HBA1C MFR BLD: 5.7 %
HBV SURFACE AG SERPL QL IA: NONREACTIVE
HCO3 BLD-SCNC: 19 MMOL/L (ref 21–28)
HCO3 BLD-SCNC: 21 MMOL/L (ref 21–28)
HCO3 BLD-SCNC: 21 MMOL/L (ref 21–28)
HCO3 BLD-SCNC: 22 MMOL/L (ref 21–28)
HCO3 BLD-SCNC: 25 MMOL/L (ref 21–28)
HCO3 BLD-SCNC: 26 MMOL/L (ref 21–28)
HCO3 BLD-SCNC: 27 MMOL/L (ref 21–28)
HCO3 BLD-SCNC: 27 MMOL/L (ref 21–28)
HCO3 BLD-SCNC: 29 MMOL/L (ref 21–28)
HCO3 BLD-SCNC: 31 MMOL/L (ref 21–28)
HCO3 BLD-SCNC: 32 MMOL/L (ref 21–28)
HCO3 BLD-SCNC: 32 MMOL/L (ref 21–28)
HCO3 BLD-SCNC: 34 MMOL/L (ref 21–28)
HCO3 BLD-SCNC: 35 MMOL/L (ref 21–28)
HCO3 BLDA-SCNC: 27 MMOL/L (ref 21–28)
HCO3 BLDA-SCNC: 27 MMOL/L (ref 21–28)
HCO3 BLDA-SCNC: 28 MMOL/L (ref 21–28)
HCO3 BLDA-SCNC: 29 MMOL/L (ref 21–28)
HCO3 BLDA-SCNC: 30 MMOL/L (ref 21–28)
HCO3 BLDV-SCNC: 24 MMOL/L (ref 21–28)
HCO3 BLDV-SCNC: 24 MMOL/L (ref 21–28)
HCO3 BLDV-SCNC: 26 MMOL/L (ref 21–28)
HCO3 BLDV-SCNC: 27 MMOL/L (ref 21–28)
HCO3 BLDV-SCNC: 29 MMOL/L (ref 21–28)
HCO3 BLDV-SCNC: 30 MMOL/L (ref 21–28)
HCO3 BLDV-SCNC: 30 MMOL/L (ref 21–28)
HCO3 BLDV-SCNC: 31 MMOL/L (ref 21–28)
HCO3 BLDV-SCNC: 32 MMOL/L (ref 21–28)
HCO3 BLDV-SCNC: 33 MMOL/L (ref 24–30)
HCO3 BLDV-SCNC: 33 MMOL/L (ref 24–30)
HCO3 BLDV-SCNC: 34 MMOL/L (ref 24–30)
HCO3 BLDV-SCNC: 38 MMOL/L (ref 24–30)
HCOV PNL SPEC NAA+PROBE: NOT DETECTED
HCT VFR BLD AUTO: 24 % (ref 35–47)
HCT VFR BLD AUTO: 24.5 % (ref 35–47)
HCT VFR BLD AUTO: 24.6 % (ref 35–47)
HCT VFR BLD AUTO: 25.2 % (ref 35–47)
HCT VFR BLD AUTO: 25.2 % (ref 35–47)
HCT VFR BLD AUTO: 25.3 % (ref 35–47)
HCT VFR BLD AUTO: 25.9 % (ref 35–47)
HCT VFR BLD AUTO: 25.9 % (ref 35–47)
HCT VFR BLD AUTO: 26.1 % (ref 35–47)
HCT VFR BLD AUTO: 26.2 % (ref 35–47)
HCT VFR BLD AUTO: 26.7 % (ref 35–47)
HCT VFR BLD AUTO: 27.3 % (ref 35–47)
HCT VFR BLD AUTO: 28.1 % (ref 35–47)
HCT VFR BLD AUTO: 28.2 % (ref 35–47)
HCT VFR BLD AUTO: 28.6 % (ref 35–47)
HCT VFR BLD AUTO: 28.7 % (ref 35–47)
HCT VFR BLD AUTO: 28.9 % (ref 35–47)
HCT VFR BLD AUTO: 29.4 % (ref 35–47)
HCT VFR BLD AUTO: 29.7 % (ref 35–47)
HCT VFR BLD AUTO: 29.8 % (ref 35–47)
HCT VFR BLD AUTO: 29.8 % (ref 35–47)
HCT VFR BLD AUTO: 30.1 % (ref 35–47)
HCT VFR BLD AUTO: 32.2 % (ref 35–47)
HCT VFR BLD AUTO: 32.4 % (ref 35–47)
HCT VFR BLD AUTO: 32.9 % (ref 35–47)
HCT VFR BLD AUTO: 34.7 % (ref 35–47)
HCT VFR BLD AUTO: 35.6 % (ref 35–47)
HCT VFR BLD AUTO: 35.8 % (ref 35–47)
HCT VFR BLD AUTO: 35.9 % (ref 35–47)
HCT VFR BLD AUTO: 36.6 % (ref 35–47)
HCT VFR BLD AUTO: 36.8 % (ref 35–47)
HCT VFR BLD AUTO: 37.5 % (ref 35–47)
HCT VFR BLD AUTO: 38.2 % (ref 35–47)
HCT VFR BLD AUTO: 38.2 % (ref 35–47)
HCT VFR BLD AUTO: 38.7 % (ref 35–47)
HCT VFR BLD AUTO: 38.8 % (ref 35–47)
HCT VFR BLD AUTO: 39 % (ref 35–47)
HCT VFR BLD AUTO: 39.2 % (ref 35–47)
HCT VFR BLD AUTO: 39.8 % (ref 35–47)
HCT VFR BLD AUTO: 39.9 % (ref 35–47)
HCT VFR BLD AUTO: 41.1 % (ref 35–47)
HCT VFR BLD AUTO: 41.6 % (ref 35–47)
HCT VFR BLD AUTO: 41.9 % (ref 35–47)
HCT VFR BLD AUTO: 43.6 % (ref 35–47)
HCT VFR BLD CALC: 33 % (ref 35–47)
HCV RNA SERPL NAA+PROBE-ACNC: NOT DETECTED IU/ML
HGB BLD-MCNC: 10 G/DL (ref 11.7–15.7)
HGB BLD-MCNC: 10.5 G/DL (ref 11.7–15.7)
HGB BLD-MCNC: 10.9 G/DL (ref 11.7–15.7)
HGB BLD-MCNC: 11.1 G/DL (ref 11.7–15.7)
HGB BLD-MCNC: 11.2 G/DL (ref 11.7–15.7)
HGB BLD-MCNC: 11.4 G/DL (ref 11.7–15.7)
HGB BLD-MCNC: 11.6 G/DL (ref 11.7–15.7)
HGB BLD-MCNC: 11.6 G/DL (ref 11.7–15.7)
HGB BLD-MCNC: 11.7 G/DL (ref 11.7–15.7)
HGB BLD-MCNC: 11.7 G/DL (ref 11.7–15.7)
HGB BLD-MCNC: 11.8 G/DL (ref 11.7–15.7)
HGB BLD-MCNC: 12 G/DL (ref 11.7–15.7)
HGB BLD-MCNC: 12.1 G/DL (ref 11.7–15.7)
HGB BLD-MCNC: 12.1 G/DL (ref 11.7–15.7)
HGB BLD-MCNC: 12.5 G/DL (ref 11.7–15.7)
HGB BLD-MCNC: 12.5 G/DL (ref 11.7–15.7)
HGB BLD-MCNC: 12.8 G/DL (ref 11.7–15.7)
HGB BLD-MCNC: 12.8 G/DL (ref 11.7–15.7)
HGB BLD-MCNC: 13.1 G/DL (ref 11.7–15.7)
HGB BLD-MCNC: 13.1 G/DL (ref 11.7–15.7)
HGB BLD-MCNC: 14.1 G/DL (ref 11.7–15.7)
HGB BLD-MCNC: 7.4 G/DL (ref 11.7–15.7)
HGB BLD-MCNC: 7.6 G/DL (ref 11.7–15.7)
HGB BLD-MCNC: 7.6 G/DL (ref 11.7–15.7)
HGB BLD-MCNC: 7.8 G/DL (ref 11.7–15.7)
HGB BLD-MCNC: 8 G/DL (ref 11.7–15.7)
HGB BLD-MCNC: 8.1 G/DL (ref 11.7–15.7)
HGB BLD-MCNC: 8.2 G/DL (ref 11.7–15.7)
HGB BLD-MCNC: 8.5 G/DL (ref 11.7–15.7)
HGB BLD-MCNC: 8.6 G/DL (ref 11.7–15.7)
HGB BLD-MCNC: 8.6 G/DL (ref 11.7–15.7)
HGB BLD-MCNC: 8.7 G/DL (ref 11.7–15.7)
HGB BLD-MCNC: 8.7 G/DL (ref 11.7–15.7)
HGB BLD-MCNC: 8.8 G/DL (ref 11.7–15.7)
HGB BLD-MCNC: 8.8 G/DL (ref 11.7–15.7)
HGB BLD-MCNC: 8.9 G/DL (ref 11.7–15.7)
HGB BLD-MCNC: 8.9 G/DL (ref 11.7–15.7)
HGB BLD-MCNC: 9.2 G/DL (ref 11.7–15.7)
HGB BLD-MCNC: 9.3 G/DL (ref 11.7–15.7)
HGB BLD-MCNC: 9.4 G/DL (ref 11.7–15.7)
HGB BLD-MCNC: 9.4 G/DL (ref 11.7–15.7)
HGB BLD-MCNC: 9.6 G/DL (ref 11.7–15.7)
HGB BLD-MCNC: 9.6 G/DL (ref 11.7–15.7)
HGB BLD-MCNC: 9.7 G/DL (ref 11.7–15.7)
HGB BLD-MCNC: 9.7 G/DL (ref 11.7–15.7)
HGB C CRYSTALS: ABNORMAL
HGB UR QL STRIP: ABNORMAL
HGB UR QL STRIP: ABNORMAL
HGB UR QL STRIP: NEGATIVE
HIV 1+2 AB+HIV1 P24 AG SERPL QL IA: NONREACTIVE
HIV 1+2 AB+HIV1P24 AG SERPLBLD IA.RAPID: NON REACTIVE
HIV 1+2 AB+HIV1P24 AG SERPLBLD IA.RAPID: NON REACTIVE
HIV INTERPRETATION: NORMAL
HMPV RNA SPEC QL NAA+PROBE: NOT DETECTED
HOLD SPECIMEN: NORMAL
HOWELL-JOLLY BOD BLD QL SMEAR: ABNORMAL
HPIV1 RNA SPEC QL NAA+PROBE: NOT DETECTED
HPIV2 RNA SPEC QL NAA+PROBE: NOT DETECTED
HPIV3 RNA SPEC QL NAA+PROBE: NOT DETECTED
HPIV4 RNA SPEC QL NAA+PROBE: NOT DETECTED
HYALINE CASTS: 15 /LPF
HYALINE CASTS: 2 /LPF
HYALINE CASTS: 6 /LPF
IMM GRANULOCYTES # BLD: 0 10E3/UL
IMM GRANULOCYTES # BLD: 0 10E3/UL
IMM GRANULOCYTES # BLD: 0.1 10E3/UL
IMM GRANULOCYTES # BLD: 0.1 10E3/UL
IMM GRANULOCYTES NFR BLD: 0 %
IMM GRANULOCYTES NFR BLD: 0 %
IMM GRANULOCYTES NFR BLD: 1 %
IMM GRANULOCYTES NFR BLD: 1 %
INR BLD: 1.5 (ref 0.9–1.1)
INR BLD: 1.5 (ref 0.9–1.1)
INR BLD: 1.7 (ref 0.9–1.1)
INR BLD: 1.8 (ref 0.9–1.1)
INR BLD: 1.8 (ref 0.9–1.1)
INR BLD: 1.9 (ref 0.9–1.1)
INR BLD: 2 (ref 0.9–1.1)
INR BLD: 2.1 (ref 0.9–1.1)
INR BLD: 2.2 (ref 0.9–1.1)
INR BLD: 2.5 (ref 0.9–1.1)
INR BLD: 2.6 (ref 0.9–1.1)
INR BLD: 2.7 (ref 0.9–1.1)
INR BLD: 3.3 (ref 0.9–1.1)
INR BLD: 3.3 (ref 0.9–1.1)
INR BLD: 3.9 (ref 0.9–1.1)
INR BLD: 4.5 (ref 0.9–1.1)
INR BLD: 4.9 (ref 0.9–1.1)
INR BLD: 5.6 (ref 0.9–1.1)
INR PPP: 0.98 (ref 0.85–1.15)
INR PPP: 1.03 (ref 0.85–1.15)
INR PPP: 1.06 (ref 0.85–1.15)
INR PPP: 1.07 (ref 0.85–1.15)
INR PPP: 1.1 (ref 0.85–1.15)
INR PPP: 1.12 (ref 0.85–1.15)
INR PPP: 1.12 (ref 0.85–1.15)
INR PPP: 1.15 (ref 0.85–1.15)
INR PPP: 1.2 (ref 0.85–1.15)
INR PPP: 1.29 (ref 0.85–1.15)
INR PPP: 1.3 (ref 0.85–1.15)
INR PPP: 1.35 (ref 0.85–1.15)
INR PPP: 1.36 (ref 0.85–1.15)
INR PPP: 1.37 (ref 0.85–1.15)
INR PPP: 1.38 (ref 0.85–1.15)
INR PPP: 1.39 (ref 0.85–1.15)
INR PPP: 1.4 (ref 0.85–1.15)
INR PPP: 1.44 (ref 0.85–1.15)
INR PPP: 1.45 (ref 0.85–1.15)
INR PPP: 1.45 (ref 0.85–1.15)
INR PPP: 1.53 (ref 0.85–1.15)
INR PPP: 1.55 (ref 0.85–1.15)
INR PPP: 1.57 (ref 0.85–1.15)
INR PPP: 1.68 (ref 0.85–1.15)
INR PPP: 1.69 (ref 0.85–1.15)
INR PPP: 1.72 (ref 0.85–1.15)
INR PPP: 1.73 (ref 0.85–1.15)
INR PPP: 2.04 (ref 0.85–1.15)
INR PPP: 2.05 (ref 0.85–1.15)
INR PPP: 2.1 (ref 0.85–1.15)
INR PPP: 2.11 (ref 0.85–1.15)
INR PPP: 2.37 (ref 0.85–1.15)
INR PPP: 2.39 (ref 0.85–1.15)
INR PPP: 2.51 (ref 0.85–1.15)
INR PPP: 2.63 (ref 0.85–1.15)
INR PPP: 2.66 (ref 0.85–1.15)
INR PPP: 2.84 (ref 0.85–1.15)
INR PPP: 2.91 (ref 0.85–1.15)
INR PPP: 2.99 (ref 0.85–1.15)
INR PPP: 3.38 (ref 0.85–1.15)
INR PPP: 3.45 (ref 0.85–1.15)
INR PPP: 3.49 (ref 0.85–1.15)
INR PPP: 3.52 (ref 0.85–1.15)
INR PPP: 3.71 (ref 0.85–1.15)
INR PPP: 3.9 (ref 0.85–1.15)
INR PPP: 3.93 (ref 0.85–1.15)
INTERPRETATION ECG - MUSE: NORMAL
IRON BINDING CAPACITY (ROCHE): 216 UG/DL (ref 240–430)
IRON SATN MFR SERPL: 13 % (ref 15–46)
IRON SERPL-MCNC: 29 UG/DL (ref 37–145)
ISSUE DATE AND TIME: NORMAL
KETONES UR STRIP-MCNC: 10 MG/DL
KETONES UR STRIP-MCNC: ABNORMAL MG/DL
KETONES UR STRIP-MCNC: NEGATIVE MG/DL
LACTATE BLD-SCNC: 0.3 MMOL/L
LACTATE BLD-SCNC: 0.5 MMOL/L
LACTATE BLD-SCNC: 0.6 MMOL/L
LACTATE BLD-SCNC: 0.6 MMOL/L (ref 0.7–2)
LACTATE BLD-SCNC: 0.7 MMOL/L
LACTATE BLD-SCNC: 0.7 MMOL/L
LACTATE BLD-SCNC: 0.8 MMOL/L
LACTATE BLD-SCNC: 0.8 MMOL/L (ref 0.7–2)
LACTATE BLD-SCNC: 1.1 MMOL/L (ref 0.7–2)
LACTATE BLD-SCNC: 1.4 MMOL/L
LACTATE SERPL-SCNC: 0.8 MMOL/L (ref 0.7–2)
LACTATE SERPL-SCNC: 0.9 MMOL/L (ref 0.7–2)
LACTATE SERPL-SCNC: 1.1 MMOL/L (ref 0.7–2)
LACTATE SERPL-SCNC: 1.2 MMOL/L (ref 0.7–2)
LACTATE SERPL-SCNC: 1.3 MMOL/L (ref 0.7–2)
LACTATE SERPL-SCNC: 1.3 MMOL/L (ref 0.7–2)
LACTATE SERPL-SCNC: 1.4 MMOL/L (ref 0.7–2)
LACTATE SERPL-SCNC: 1.5 MMOL/L (ref 0.7–2)
LACTATE SERPL-SCNC: 1.7 MMOL/L (ref 0.7–2)
LACTATE SERPL-SCNC: 1.7 MMOL/L (ref 0.7–2)
LACTATE SERPL-SCNC: 1.8 MMOL/L (ref 0.7–2)
LACTATE SERPL-SCNC: 1.8 MMOL/L (ref 0.7–2)
LACTATE SERPL-SCNC: 1.9 MMOL/L (ref 0.7–2)
LACTATE SERPL-SCNC: 2 MMOL/L (ref 0.7–2)
LACTATE SERPL-SCNC: 2.2 MMOL/L (ref 0.7–2)
LACTATE SERPL-SCNC: 2.3 MMOL/L (ref 0.7–2)
LACTATE SERPL-SCNC: 2.4 MMOL/L (ref 0.7–2)
LACTATE SERPL-SCNC: 2.4 MMOL/L (ref 0.7–2)
LACTATE SERPL-SCNC: 2.6 MMOL/L (ref 0.7–2)
LACTATE SERPL-SCNC: 3.1 MMOL/L (ref 0.7–2)
LACTATE SERPL-SCNC: 3.1 MMOL/L (ref 0.7–2)
LACTATE SERPL-SCNC: 3.2 MMOL/L (ref 0.7–2)
LACTATE SERPL-SCNC: 3.7 MMOL/L (ref 0.7–2)
LACTATE SERPL-SCNC: 4.4 MMOL/L (ref 0.7–2)
LEUKOCYTE ESTERASE UR QL STRIP: ABNORMAL
LEUKOCYTE ESTERASE UR QL STRIP: NEGATIVE
LVEF ECHO: NORMAL
LYMPHOCYTES # BLD AUTO: 0.6 10E3/UL (ref 0.8–5.3)
LYMPHOCYTES # BLD AUTO: 0.6 10E3/UL (ref 0.8–5.3)
LYMPHOCYTES # BLD AUTO: 0.8 10E3/UL (ref 0.8–5.3)
LYMPHOCYTES # BLD AUTO: 0.8 10E3/UL (ref 0.8–5.3)
LYMPHOCYTES NFR BLD AUTO: 11 %
LYMPHOCYTES NFR BLD AUTO: 13 %
LYMPHOCYTES NFR BLD AUTO: 7 %
LYMPHOCYTES NFR BLD AUTO: 9 %
M PNEUMO DNA SPEC QL NAA+PROBE: NOT DETECTED
MAGNESIUM SERPL-MCNC: 2.2 MG/DL (ref 1.7–2.3)
MAGNESIUM SERPL-MCNC: 2.3 MG/DL (ref 1.7–2.3)
MAGNESIUM SERPL-MCNC: 2.4 MG/DL (ref 1.7–2.3)
MAGNESIUM SERPL-MCNC: 2.5 MG/DL (ref 1.7–2.3)
MAGNESIUM SERPL-MCNC: 2.6 MG/DL (ref 1.7–2.3)
MAGNESIUM SERPL-MCNC: 2.6 MG/DL (ref 1.7–2.3)
MAGNESIUM SERPL-MCNC: 2.7 MG/DL (ref 1.7–2.3)
MAGNESIUM SERPL-MCNC: 2.8 MG/DL (ref 1.7–2.3)
MAGNESIUM SERPL-MCNC: 2.9 MG/DL (ref 1.7–2.3)
MCH RBC QN AUTO: 28.3 PG (ref 26.5–33)
MCH RBC QN AUTO: 28.4 PG (ref 26.5–33)
MCH RBC QN AUTO: 28.5 PG (ref 26.5–33)
MCH RBC QN AUTO: 28.6 PG (ref 26.5–33)
MCH RBC QN AUTO: 28.6 PG (ref 26.5–33)
MCH RBC QN AUTO: 28.7 PG (ref 26.5–33)
MCH RBC QN AUTO: 28.7 PG (ref 26.5–33)
MCH RBC QN AUTO: 28.8 PG (ref 26.5–33)
MCH RBC QN AUTO: 28.9 PG (ref 26.5–33)
MCH RBC QN AUTO: 29 PG (ref 26.5–33)
MCH RBC QN AUTO: 29.1 PG (ref 26.5–33)
MCH RBC QN AUTO: 29.2 PG (ref 26.5–33)
MCH RBC QN AUTO: 29.3 PG (ref 26.5–33)
MCH RBC QN AUTO: 29.3 PG (ref 26.5–33)
MCH RBC QN AUTO: 29.4 PG (ref 26.5–33)
MCH RBC QN AUTO: 29.5 PG (ref 26.5–33)
MCH RBC QN AUTO: 29.6 PG (ref 26.5–33)
MCH RBC QN AUTO: 29.7 PG (ref 26.5–33)
MCH RBC QN AUTO: 29.8 PG (ref 26.5–33)
MCH RBC QN AUTO: 29.9 PG (ref 26.5–33)
MCH RBC QN AUTO: 30.2 PG (ref 26.5–33)
MCHC RBC AUTO-ENTMCNC: 28.7 G/DL (ref 31.5–36.5)
MCHC RBC AUTO-ENTMCNC: 28.9 G/DL (ref 31.5–36.5)
MCHC RBC AUTO-ENTMCNC: 29.4 G/DL (ref 31.5–36.5)
MCHC RBC AUTO-ENTMCNC: 29.4 G/DL (ref 31.5–36.5)
MCHC RBC AUTO-ENTMCNC: 29.6 G/DL (ref 31.5–36.5)
MCHC RBC AUTO-ENTMCNC: 29.8 G/DL (ref 31.5–36.5)
MCHC RBC AUTO-ENTMCNC: 29.9 G/DL (ref 31.5–36.5)
MCHC RBC AUTO-ENTMCNC: 29.9 G/DL (ref 31.5–36.5)
MCHC RBC AUTO-ENTMCNC: 30.1 G/DL (ref 31.5–36.5)
MCHC RBC AUTO-ENTMCNC: 30.1 G/DL (ref 31.5–36.5)
MCHC RBC AUTO-ENTMCNC: 30.2 G/DL (ref 31.5–36.5)
MCHC RBC AUTO-ENTMCNC: 30.2 G/DL (ref 31.5–36.5)
MCHC RBC AUTO-ENTMCNC: 30.3 G/DL (ref 31.5–36.5)
MCHC RBC AUTO-ENTMCNC: 30.4 G/DL (ref 31.5–36.5)
MCHC RBC AUTO-ENTMCNC: 30.4 G/DL (ref 31.5–36.5)
MCHC RBC AUTO-ENTMCNC: 30.5 G/DL (ref 31.5–36.5)
MCHC RBC AUTO-ENTMCNC: 30.5 G/DL (ref 31.5–36.5)
MCHC RBC AUTO-ENTMCNC: 30.7 G/DL (ref 31.5–36.5)
MCHC RBC AUTO-ENTMCNC: 30.8 G/DL (ref 31.5–36.5)
MCHC RBC AUTO-ENTMCNC: 30.8 G/DL (ref 31.5–36.5)
MCHC RBC AUTO-ENTMCNC: 30.9 G/DL (ref 31.5–36.5)
MCHC RBC AUTO-ENTMCNC: 31 G/DL (ref 31.5–36.5)
MCHC RBC AUTO-ENTMCNC: 31.1 G/DL (ref 31.5–36.5)
MCHC RBC AUTO-ENTMCNC: 31.2 G/DL (ref 31.5–36.5)
MCHC RBC AUTO-ENTMCNC: 31.3 G/DL (ref 31.5–36.5)
MCHC RBC AUTO-ENTMCNC: 31.3 G/DL (ref 31.5–36.5)
MCHC RBC AUTO-ENTMCNC: 31.5 G/DL (ref 31.5–36.5)
MCHC RBC AUTO-ENTMCNC: 31.7 G/DL (ref 31.5–36.5)
MCHC RBC AUTO-ENTMCNC: 31.9 G/DL (ref 31.5–36.5)
MCHC RBC AUTO-ENTMCNC: 31.9 G/DL (ref 31.5–36.5)
MCHC RBC AUTO-ENTMCNC: 32 G/DL (ref 31.5–36.5)
MCHC RBC AUTO-ENTMCNC: 32.2 G/DL (ref 31.5–36.5)
MCHC RBC AUTO-ENTMCNC: 32.3 G/DL (ref 31.5–36.5)
MCHC RBC AUTO-ENTMCNC: 32.5 G/DL (ref 31.5–36.5)
MCHC RBC AUTO-ENTMCNC: 32.6 G/DL (ref 31.5–36.5)
MCHC RBC AUTO-ENTMCNC: 32.6 G/DL (ref 31.5–36.5)
MCHC RBC AUTO-ENTMCNC: 32.7 G/DL (ref 31.5–36.5)
MCHC RBC AUTO-ENTMCNC: 32.9 G/DL (ref 31.5–36.5)
MCV RBC AUTO: 87 FL (ref 78–100)
MCV RBC AUTO: 88 FL (ref 78–100)
MCV RBC AUTO: 90 FL (ref 78–100)
MCV RBC AUTO: 91 FL (ref 78–100)
MCV RBC AUTO: 92 FL (ref 78–100)
MCV RBC AUTO: 92 FL (ref 78–100)
MCV RBC AUTO: 93 FL (ref 78–100)
MCV RBC AUTO: 94 FL (ref 78–100)
MCV RBC AUTO: 95 FL (ref 78–100)
MCV RBC AUTO: 96 FL (ref 78–100)
MCV RBC AUTO: 97 FL (ref 78–100)
MCV RBC AUTO: 98 FL (ref 78–100)
MCV RBC AUTO: 99 FL (ref 78–100)
MCV RBC AUTO: 99 FL (ref 78–100)
MDC_IDC_LEAD_IMPLANT_DT: NORMAL
MDC_IDC_LEAD_LOCATION: NORMAL
MDC_IDC_LEAD_LOCATION_DETAIL_1: NORMAL
MDC_IDC_LEAD_MFG: NORMAL
MDC_IDC_LEAD_MODEL: NORMAL
MDC_IDC_LEAD_POLARITY_TYPE: NORMAL
MDC_IDC_LEAD_SERIAL: NORMAL
MDC_IDC_MSMT_BATTERY_DTM: NORMAL
MDC_IDC_MSMT_BATTERY_REMAINING_LONGEVITY: 107 MO
MDC_IDC_MSMT_BATTERY_REMAINING_LONGEVITY: 108 MO
MDC_IDC_MSMT_BATTERY_REMAINING_LONGEVITY: 110 MO
MDC_IDC_MSMT_BATTERY_REMAINING_LONGEVITY: 50 MO
MDC_IDC_MSMT_BATTERY_REMAINING_LONGEVITY: 70 MO
MDC_IDC_MSMT_BATTERY_REMAINING_LONGEVITY: 75 MO
MDC_IDC_MSMT_BATTERY_REMAINING_LONGEVITY: 87 MO
MDC_IDC_MSMT_BATTERY_REMAINING_LONGEVITY: 95 MO
MDC_IDC_MSMT_BATTERY_RRT_TRIGGER: 2.56
MDC_IDC_MSMT_BATTERY_RRT_TRIGGER: 2.62
MDC_IDC_MSMT_BATTERY_STATUS: NORMAL
MDC_IDC_MSMT_BATTERY_VOLTAGE: 2.93 V
MDC_IDC_MSMT_BATTERY_VOLTAGE: 2.99 V
MDC_IDC_MSMT_BATTERY_VOLTAGE: 3 V
MDC_IDC_MSMT_BATTERY_VOLTAGE: 3 V
MDC_IDC_MSMT_BATTERY_VOLTAGE: 3.15 V
MDC_IDC_MSMT_BATTERY_VOLTAGE: 3.15 V
MDC_IDC_MSMT_LEADCHNL_RV_IMPEDANCE_VALUE: 361 OHM
MDC_IDC_MSMT_LEADCHNL_RV_IMPEDANCE_VALUE: 380 OHM
MDC_IDC_MSMT_LEADCHNL_RV_IMPEDANCE_VALUE: 418 OHM
MDC_IDC_MSMT_LEADCHNL_RV_IMPEDANCE_VALUE: 430 OHM
MDC_IDC_MSMT_LEADCHNL_RV_IMPEDANCE_VALUE: 437 OHM
MDC_IDC_MSMT_LEADCHNL_RV_IMPEDANCE_VALUE: 456 OHM
MDC_IDC_MSMT_LEADCHNL_RV_IMPEDANCE_VALUE: 500 OHM
MDC_IDC_MSMT_LEADCHNL_RV_IMPEDANCE_VALUE: 500 OHM
MDC_IDC_MSMT_LEADCHNL_RV_IMPEDANCE_VALUE: 540 OHM
MDC_IDC_MSMT_LEADCHNL_RV_IMPEDANCE_VALUE: 550 OHM
MDC_IDC_MSMT_LEADCHNL_RV_PACING_THRESHOLD_AMPLITUDE: 0.62 V
MDC_IDC_MSMT_LEADCHNL_RV_PACING_THRESHOLD_AMPLITUDE: 0.62 V
MDC_IDC_MSMT_LEADCHNL_RV_PACING_THRESHOLD_AMPLITUDE: 0.75 V
MDC_IDC_MSMT_LEADCHNL_RV_PACING_THRESHOLD_AMPLITUDE: 1.12 V
MDC_IDC_MSMT_LEADCHNL_RV_PACING_THRESHOLD_AMPLITUDE: 1.63 V
MDC_IDC_MSMT_LEADCHNL_RV_PACING_THRESHOLD_AMPLITUDE: 1.75 V
MDC_IDC_MSMT_LEADCHNL_RV_PACING_THRESHOLD_AMPLITUDE: 1.88 V
MDC_IDC_MSMT_LEADCHNL_RV_PACING_THRESHOLD_AMPLITUDE: 1.88 V
MDC_IDC_MSMT_LEADCHNL_RV_PACING_THRESHOLD_PULSEWIDTH: 0.24 MS
MDC_IDC_MSMT_LEADCHNL_RV_PACING_THRESHOLD_PULSEWIDTH: 0.4 MS
MDC_IDC_MSMT_LEADCHNL_RV_SENSING_INTR_AMPL: 11.5 MV
MDC_IDC_MSMT_LEADCHNL_RV_SENSING_INTR_AMPL: 15 MV
MDC_IDC_MSMT_LEADCHNL_RV_SENSING_INTR_AMPL: 6 MV
MDC_IDC_PG_IMPLANT_DTM: NORMAL
MDC_IDC_PG_MFG: NORMAL
MDC_IDC_PG_MODEL: NORMAL
MDC_IDC_PG_SERIAL: NORMAL
MDC_IDC_PG_TYPE: NORMAL
MDC_IDC_SESS_CLINIC_NAME: NORMAL
MDC_IDC_SESS_DTM: NORMAL
MDC_IDC_SESS_TYPE: NORMAL
MDC_IDC_SET_BRADY_HYSTRATE: NORMAL
MDC_IDC_SET_BRADY_LOWRATE: 60 {BEATS}/MIN
MDC_IDC_SET_BRADY_LOWRATE: 80 {BEATS}/MIN
MDC_IDC_SET_BRADY_MAX_SENSOR_RATE: 120 {BEATS}/MIN
MDC_IDC_SET_BRADY_MODE: NORMAL
MDC_IDC_SET_LEADCHNL_RV_PACING_AMPLITUDE: 2 V
MDC_IDC_SET_LEADCHNL_RV_PACING_AMPLITUDE: 2.62
MDC_IDC_SET_LEADCHNL_RV_PACING_AMPLITUDE: 3 V
MDC_IDC_SET_LEADCHNL_RV_PACING_AMPLITUDE: 3 V
MDC_IDC_SET_LEADCHNL_RV_PACING_AMPLITUDE: 3.25 V
MDC_IDC_SET_LEADCHNL_RV_PACING_AMPLITUDE: 3.5 V
MDC_IDC_SET_LEADCHNL_RV_PACING_ANODE_ELECTRODE_1: NORMAL
MDC_IDC_SET_LEADCHNL_RV_PACING_ANODE_LOCATION_1: NORMAL
MDC_IDC_SET_LEADCHNL_RV_PACING_CAPTURE_MODE: NORMAL
MDC_IDC_SET_LEADCHNL_RV_PACING_CATHODE_ELECTRODE_1: NORMAL
MDC_IDC_SET_LEADCHNL_RV_PACING_CATHODE_LOCATION_1: NORMAL
MDC_IDC_SET_LEADCHNL_RV_PACING_POLARITY: NORMAL
MDC_IDC_SET_LEADCHNL_RV_PACING_PULSEWIDTH: 0.24 MS
MDC_IDC_SET_LEADCHNL_RV_PACING_PULSEWIDTH: 0.4 MS
MDC_IDC_SET_LEADCHNL_RV_SENSING_ANODE_ELECTRODE_1: NORMAL
MDC_IDC_SET_LEADCHNL_RV_SENSING_ANODE_LOCATION_1: NORMAL
MDC_IDC_SET_LEADCHNL_RV_SENSING_CATHODE_ELECTRODE_1: NORMAL
MDC_IDC_SET_LEADCHNL_RV_SENSING_CATHODE_LOCATION_1: NORMAL
MDC_IDC_SET_LEADCHNL_RV_SENSING_POLARITY: NORMAL
MDC_IDC_SET_LEADCHNL_RV_SENSING_SENSITIVITY: 0.9 MV
MDC_IDC_SET_LEADCHNL_RV_SENSING_SENSITIVITY: 2 MV
MDC_IDC_SET_ZONE_DETECTION_INTERVAL: 360 MS
MDC_IDC_SET_ZONE_TYPE: NORMAL
MDC_IDC_SET_ZONE_VENDOR_TYPE: NORMAL
MDC_IDC_STAT_BRADY_DTM_END: NORMAL
MDC_IDC_STAT_BRADY_DTM_START: NORMAL
MDC_IDC_STAT_BRADY_RV_PERCENT_PACED: 99.84 %
MDC_IDC_STAT_BRADY_RV_PERCENT_PACED: 99.94 %
MDC_IDC_STAT_BRADY_RV_PERCENT_PACED: 99.95 %
MDC_IDC_STAT_BRADY_RV_PERCENT_PACED: 99.96 %
MDC_IDC_STAT_BRADY_RV_PERCENT_PACED: 99.97 %
MDC_IDC_STAT_BRADY_RV_PERCENT_PACED: 99.97 %
MDC_IDC_STAT_BRADY_RV_PERCENT_PACED: 99.98 %
MDC_IDC_STAT_BRADY_RV_PERCENT_PACED: 99.99 %
MDC_IDC_STAT_EPISODE_RECENT_COUNT: 0
MDC_IDC_STAT_EPISODE_RECENT_COUNT_DTM_END: NORMAL
MDC_IDC_STAT_EPISODE_RECENT_COUNT_DTM_START: NORMAL
MDC_IDC_STAT_EPISODE_TOTAL_COUNT: 0
MDC_IDC_STAT_EPISODE_TOTAL_COUNT_DTM_END: NORMAL
MDC_IDC_STAT_EPISODE_TOTAL_COUNT_DTM_START: NORMAL
MDC_IDC_STAT_EPISODE_TYPE: NORMAL
MONOCYTES # BLD AUTO: 0.1 10E3/UL (ref 0–1.3)
MONOCYTES # BLD AUTO: 0.5 10E3/UL (ref 0–1.3)
MONOCYTES # BLD AUTO: 0.6 10E3/UL (ref 0–1.3)
MONOCYTES # BLD AUTO: 0.8 10E3/UL (ref 0–1.3)
MONOCYTES NFR BLD AUTO: 1 %
MONOCYTES NFR BLD AUTO: 10 %
MONOCYTES NFR BLD AUTO: 7 %
MONOCYTES NFR BLD AUTO: 8 %
MRSA DNA SPEC QL NAA+PROBE: NEGATIVE
MRSA DNA SPEC QL NAA+PROBE: NEGATIVE
MUCOUS THREADS #/AREA URNS LPF: PRESENT /LPF
NEUTROPHILS # BLD AUTO: 10 10E3/UL (ref 1.6–8.3)
NEUTROPHILS # BLD AUTO: 4.3 10E3/UL (ref 1.6–8.3)
NEUTROPHILS # BLD AUTO: 4.4 10E3/UL (ref 1.6–8.3)
NEUTROPHILS # BLD AUTO: 5.5 10E3/UL (ref 1.6–8.3)
NEUTROPHILS NFR BLD AUTO: 72 %
NEUTROPHILS NFR BLD AUTO: 75 %
NEUTROPHILS NFR BLD AUTO: 86 %
NEUTROPHILS NFR BLD AUTO: 89 %
NEUTS HYPERSEG BLD QL SMEAR: ABNORMAL
NITRATE UR QL: NEGATIVE
NRBC # BLD AUTO: 0 10E3/UL
NRBC # BLD AUTO: 0 10E3/UL
NRBC BLD AUTO-RTO: 0 /100
NRBC BLD AUTO-RTO: 0 /100
NT-PROBNP SERPL-MCNC: 3249 PG/ML (ref 0–1800)
NT-PROBNP SERPL-MCNC: 3710 PG/ML (ref 0–1800)
NT-PROBNP SERPL-MCNC: 5228 PG/ML (ref 0–1800)
NT-PROBNP SERPL-MCNC: ABNORMAL PG/ML (ref 0–1800)
O2/TOTAL GAS SETTING VFR VENT: 100 %
O2/TOTAL GAS SETTING VFR VENT: 2 %
O2/TOTAL GAS SETTING VFR VENT: 2 %
O2/TOTAL GAS SETTING VFR VENT: 28 %
O2/TOTAL GAS SETTING VFR VENT: 30 %
O2/TOTAL GAS SETTING VFR VENT: 30 %
O2/TOTAL GAS SETTING VFR VENT: 32 %
O2/TOTAL GAS SETTING VFR VENT: 40 %
O2/TOTAL GAS SETTING VFR VENT: 5 %
O2/TOTAL GAS SETTING VFR VENT: 50 %
O2/TOTAL GAS SETTING VFR VENT: 60 %
O2/TOTAL GAS SETTING VFR VENT: 7 %
O2/TOTAL GAS SETTING VFR VENT: 70 %
O2/TOTAL GAS SETTING VFR VENT: 75 %
O2/TOTAL GAS SETTING VFR VENT: 75 %
O2/TOTAL GAS SETTING VFR VENT: 80 %
O2/TOTAL GAS SETTING VFR VENT: 80 %
O2/TOTAL GAS SETTING VFR VENT: 90 %
O2/TOTAL GAS SETTING VFR VENT: 90 %
OBSERVATION IMP: NEGATIVE
OXYHGB MFR BLD: 89 % (ref 92–100)
OXYHGB MFR BLD: 93 % (ref 92–100)
OXYHGB MFR BLD: 93 % (ref 92–100)
OXYHGB MFR BLD: 94 % (ref 92–100)
OXYHGB MFR BLD: 95 % (ref 92–100)
OXYHGB MFR BLD: 96 % (ref 92–100)
OXYHGB MFR BLD: 96 % (ref 92–100)
OXYHGB MFR BLD: 97 % (ref 92–100)
OXYHGB MFR BLD: 98 % (ref 92–100)
OXYHGB MFR BLD: 99 % (ref 92–100)
OXYHGB MFR BLDA: 99 % (ref 92–100)
OXYHGB MFR BLDV: 37 % (ref 70–75)
OXYHGB MFR BLDV: 52.9 % (ref 70–75)
OXYHGB MFR BLDV: 63 % (ref 70–75)
OXYHGB MFR BLDV: 63 % (ref 70–75)
OXYHGB MFR BLDV: 64 % (ref 70–75)
OXYHGB MFR BLDV: 67 % (ref 70–75)
OXYHGB MFR BLDV: 77 % (ref 70–75)
OXYHGB MFR BLDV: 80 % (ref 70–75)
OXYHGB MFR BLDV: 81 % (ref 92–100)
OXYHGB MFR BLDV: 88 % (ref 70–75)
OXYHGB MFR BLDV: 88 % (ref 70–75)
P AXIS - MUSE: 232 DEGREES
P AXIS - MUSE: 261 DEGREES
P AXIS - MUSE: 91 DEGREES
P AXIS - MUSE: NORMAL DEGREES
PCO2 BLD: 28 MM HG (ref 35–45)
PCO2 BLD: 35 MM HG (ref 35–45)
PCO2 BLD: 37 MM HG (ref 35–45)
PCO2 BLD: 38 MM HG (ref 35–45)
PCO2 BLD: 38 MM HG (ref 35–45)
PCO2 BLD: 42 MM HG (ref 35–45)
PCO2 BLD: 43 MM HG (ref 35–45)
PCO2 BLD: 44 MM HG (ref 35–45)
PCO2 BLD: 45 MM HG (ref 35–45)
PCO2 BLD: 46 MM HG (ref 35–45)
PCO2 BLD: 47 MM HG (ref 35–45)
PCO2 BLD: 47 MM HG (ref 35–45)
PCO2 BLD: 48 MM HG (ref 35–45)
PCO2 BLD: 52 MM HG (ref 35–45)
PCO2 BLD: 54 MM HG (ref 35–45)
PCO2 BLD: 57 MM HG (ref 35–45)
PCO2 BLD: 64 MM HG (ref 35–45)
PCO2 BLD: 64 MM HG (ref 35–45)
PCO2 BLD: 65 MM HG (ref 35–45)
PCO2 BLD: 71 MM HG (ref 35–45)
PCO2 BLDA: 36 MM HG (ref 35–45)
PCO2 BLDA: 40 MM HG (ref 35–45)
PCO2 BLDA: 41 MM HG (ref 35–45)
PCO2 BLDA: 42 MM HG (ref 35–45)
PCO2 BLDA: 43 MM HG (ref 35–45)
PCO2 BLDA: 43 MM HG (ref 35–45)
PCO2 BLDA: 44 MM HG (ref 35–45)
PCO2 BLDA: 76 MM HG (ref 35–45)
PCO2 BLDV: 39 MM HG (ref 40–50)
PCO2 BLDV: 40 MM HG (ref 40–50)
PCO2 BLDV: 41 MM HG (ref 40–50)
PCO2 BLDV: 46 MM HG (ref 40–50)
PCO2 BLDV: 48 MM HG (ref 40–50)
PCO2 BLDV: 50 MM HG (ref 40–50)
PCO2 BLDV: 51 MM HG (ref 40–50)
PCO2 BLDV: 54 MM HG (ref 40–50)
PCO2 BLDV: 54 MM HG (ref 40–50)
PCO2 BLDV: 55 MM HG (ref 35–50)
PCO2 BLDV: 55 MM HG (ref 40–50)
PCO2 BLDV: 57 MM HG (ref 35–50)
PCO2 BLDV: 57 MM HG (ref 40–50)
PCO2 BLDV: 59 MM HG (ref 40–50)
PCO2 BLDV: 62 MM HG (ref 40–50)
PCO2 BLDV: 65 MM HG (ref 35–50)
PCO2 BLDV: 76 MM HG (ref 35–50)
PCO2 BLDV: 76 MM HG (ref 40–50)
PCO2 BLDV: 77 MM HG (ref 40–50)
PH BLD: 7.17 [PH] (ref 7.35–7.45)
PH BLD: 7.21 [PH] (ref 7.35–7.45)
PH BLD: 7.21 [PH] (ref 7.35–7.45)
PH BLD: 7.22 [PH] (ref 7.35–7.45)
PH BLD: 7.24 [PH] (ref 7.35–7.45)
PH BLD: 7.3 [PH] (ref 7.35–7.45)
PH BLD: 7.32 [PH] (ref 7.35–7.45)
PH BLD: 7.35 [PH] (ref 7.35–7.45)
PH BLD: 7.35 [PH] (ref 7.35–7.45)
PH BLD: 7.36 [PH] (ref 7.35–7.45)
PH BLD: 7.37 [PH] (ref 7.35–7.45)
PH BLD: 7.37 [PH] (ref 7.35–7.45)
PH BLD: 7.38 [PH] (ref 7.35–7.45)
PH BLD: 7.4 [PH] (ref 7.35–7.45)
PH BLD: 7.41 [PH] (ref 7.35–7.45)
PH BLD: 7.43 [PH] (ref 7.35–7.45)
PH BLD: 7.44 [PH] (ref 7.35–7.45)
PH BLD: 7.44 [PH] (ref 7.35–7.45)
PH BLD: 7.45 [PH] (ref 7.35–7.45)
PH BLD: 7.45 [PH] (ref 7.35–7.45)
PH BLD: 7.49 [PH] (ref 7.35–7.45)
PH BLD: 7.5 [PH] (ref 7.35–7.45)
PH BLD: 7.52 [PH] (ref 7.35–7.45)
PH BLD: 7.57 [PH] (ref 7.35–7.45)
PH BLDA: 7.15 [PH] (ref 7.35–7.45)
PH BLDA: 7.41 [PH] (ref 7.35–7.45)
PH BLDA: 7.43 [PH] (ref 7.35–7.45)
PH BLDA: 7.45 [PH] (ref 7.35–7.45)
PH BLDA: 7.45 [PH] (ref 7.35–7.45)
PH BLDA: 7.46 [PH] (ref 7.35–7.45)
PH BLDA: 7.48 [PH] (ref 7.35–7.45)
PH BLDA: 7.51 [PH] (ref 7.35–7.45)
PH BLDV: 7.16 [PH] (ref 7.32–7.43)
PH BLDV: 7.16 [PH] (ref 7.32–7.43)
PH BLDV: 7.24 [PH] (ref 7.32–7.43)
PH BLDV: 7.25 [PH] (ref 7.32–7.43)
PH BLDV: 7.31 [PH] (ref 7.35–7.45)
PH BLDV: 7.34 [PH] (ref 7.32–7.43)
PH BLDV: 7.34 [PH] (ref 7.32–7.43)
PH BLDV: 7.37 [PH] (ref 7.32–7.43)
PH BLDV: 7.37 [PH] (ref 7.35–7.45)
PH BLDV: 7.38 [PH] (ref 7.32–7.43)
PH BLDV: 7.39 [PH] (ref 7.32–7.43)
PH BLDV: 7.4 [PH] (ref 7.32–7.43)
PH BLDV: 7.42 [PH] (ref 7.35–7.45)
PH BLDV: 7.43 [PH] (ref 7.32–7.43)
PH BLDV: 7.43 [PH] (ref 7.35–7.45)
PH BLDV: 7.48 [PH] (ref 7.32–7.43)
PH UR STRIP: 5 [PH] (ref 5–7)
PH UR STRIP: 6 [PH] (ref 5–7)
PH UR STRIP: 6 [PH] (ref 5–7)
PH UR STRIP: 6.5 [PH] (ref 5–7)
PH UR STRIP: 7 [PH] (ref 5–7)
PHOSPHATE SERPL-MCNC: 1.9 MG/DL (ref 2.5–4.5)
PHOSPHATE SERPL-MCNC: 2 MG/DL (ref 2.5–4.5)
PHOSPHATE SERPL-MCNC: 2.5 MG/DL (ref 2.5–4.5)
PHOSPHATE SERPL-MCNC: 2.7 MG/DL (ref 2.5–4.5)
PHOSPHATE SERPL-MCNC: 2.7 MG/DL (ref 2.5–4.5)
PHOSPHATE SERPL-MCNC: 2.9 MG/DL (ref 2.5–4.5)
PHOSPHATE SERPL-MCNC: 3.1 MG/DL (ref 2.5–4.5)
PHOSPHATE SERPL-MCNC: 3.2 MG/DL (ref 2.5–4.5)
PHOSPHATE SERPL-MCNC: 3.2 MG/DL (ref 2.5–4.5)
PHOSPHATE SERPL-MCNC: 3.3 MG/DL (ref 2.5–4.5)
PHOSPHATE SERPL-MCNC: 3.4 MG/DL (ref 2.5–4.5)
PHOSPHATE SERPL-MCNC: 3.5 MG/DL (ref 2.5–4.5)
PHOSPHATE SERPL-MCNC: 3.6 MG/DL (ref 2.5–4.5)
PHOSPHATE SERPL-MCNC: 3.7 MG/DL (ref 2.5–4.5)
PHOSPHATE SERPL-MCNC: 3.8 MG/DL (ref 2.5–4.5)
PHOSPHATE SERPL-MCNC: 3.8 MG/DL (ref 2.5–4.5)
PHOSPHATE SERPL-MCNC: 3.9 MG/DL (ref 2.5–4.5)
PHOSPHATE SERPL-MCNC: 4 MG/DL (ref 2.5–4.5)
PHOSPHATE SERPL-MCNC: 4.2 MG/DL (ref 2.5–4.5)
PHOSPHATE SERPL-MCNC: 4.2 MG/DL (ref 2.5–4.5)
PHOSPHATE SERPL-MCNC: 4.4 MG/DL (ref 2.5–4.5)
PHOSPHATE SERPL-MCNC: 4.5 MG/DL (ref 2.5–4.5)
PHOSPHATE SERPL-MCNC: 4.9 MG/DL (ref 2.5–4.5)
PHOSPHATE SERPL-MCNC: 4.9 MG/DL (ref 2.5–4.5)
PHOSPHATE SERPL-MCNC: 5.3 MG/DL (ref 2.5–4.5)
PHOSPHATE SERPL-MCNC: 6.5 MG/DL (ref 2.5–4.5)
PLAT MORPH BLD: ABNORMAL
PLAT MORPH BLD: NORMAL
PLATELET # BLD AUTO: 109 10E3/UL (ref 150–450)
PLATELET # BLD AUTO: 132 10E3/UL (ref 150–450)
PLATELET # BLD AUTO: 138 10E3/UL (ref 150–450)
PLATELET # BLD AUTO: 140 10E3/UL (ref 150–450)
PLATELET # BLD AUTO: 142 10E3/UL (ref 150–450)
PLATELET # BLD AUTO: 142 10E3/UL (ref 150–450)
PLATELET # BLD AUTO: 165 10E3/UL (ref 150–450)
PLATELET # BLD AUTO: 168 10E3/UL (ref 150–450)
PLATELET # BLD AUTO: 183 10E3/UL (ref 150–450)
PLATELET # BLD AUTO: 185 10E3/UL (ref 150–450)
PLATELET # BLD AUTO: 19 10E3/UL (ref 150–450)
PLATELET # BLD AUTO: 194 10E3/UL (ref 150–450)
PLATELET # BLD AUTO: 195 10E3/UL (ref 150–450)
PLATELET # BLD AUTO: 197 10E3/UL (ref 150–450)
PLATELET # BLD AUTO: 203 10E3/UL (ref 150–450)
PLATELET # BLD AUTO: 207 10E3/UL (ref 150–450)
PLATELET # BLD AUTO: 212 10E3/UL (ref 150–450)
PLATELET # BLD AUTO: 215 10E3/UL (ref 150–450)
PLATELET # BLD AUTO: 221 10E3/UL (ref 150–450)
PLATELET # BLD AUTO: 236 10E3/UL (ref 150–450)
PLATELET # BLD AUTO: 236 10E3/UL (ref 150–450)
PLATELET # BLD AUTO: 242 10E3/UL (ref 150–450)
PLATELET # BLD AUTO: 244 10E3/UL (ref 150–450)
PLATELET # BLD AUTO: 245 10E3/UL (ref 150–450)
PLATELET # BLD AUTO: 245 10E3/UL (ref 150–450)
PLATELET # BLD AUTO: 250 10E3/UL (ref 150–450)
PLATELET # BLD AUTO: 253 10E3/UL (ref 150–450)
PLATELET # BLD AUTO: 254 10E3/UL (ref 150–450)
PLATELET # BLD AUTO: 255 10E3/UL (ref 150–450)
PLATELET # BLD AUTO: 263 10E3/UL (ref 150–450)
PLATELET # BLD AUTO: 267 10E3/UL (ref 150–450)
PLATELET # BLD AUTO: 27 10E3/UL (ref 150–450)
PLATELET # BLD AUTO: 271 10E3/UL (ref 150–450)
PLATELET # BLD AUTO: 271 10E3/UL (ref 150–450)
PLATELET # BLD AUTO: 272 10E3/UL (ref 150–450)
PLATELET # BLD AUTO: 273 10E3/UL (ref 150–450)
PLATELET # BLD AUTO: 274 10E3/UL (ref 150–450)
PLATELET # BLD AUTO: 275 10E3/UL (ref 150–450)
PLATELET # BLD AUTO: 285 10E3/UL (ref 150–450)
PLATELET # BLD AUTO: 302 10E3/UL (ref 150–450)
PLATELET # BLD AUTO: 318 10E3/UL (ref 150–450)
PLATELET # BLD AUTO: 321 10E3/UL (ref 150–450)
PLATELET # BLD AUTO: 367 10E3/UL (ref 150–450)
PLATELET # BLD AUTO: 72 10E3/UL (ref 150–450)
PO2 BLD: 100 MM HG (ref 80–105)
PO2 BLD: 112 MM HG (ref 80–105)
PO2 BLD: 120 MM HG (ref 80–105)
PO2 BLD: 120 MM HG (ref 80–105)
PO2 BLD: 131 MM HG (ref 80–105)
PO2 BLD: 145 MM HG (ref 80–105)
PO2 BLD: 209 MM HG (ref 80–105)
PO2 BLD: 293 MM HG (ref 80–105)
PO2 BLD: 60 MM HG (ref 80–105)
PO2 BLD: 66 MM HG (ref 80–105)
PO2 BLD: 69 MM HG (ref 80–105)
PO2 BLD: 73 MM HG (ref 80–105)
PO2 BLD: 80 MM HG (ref 80–105)
PO2 BLD: 81 MM HG (ref 80–105)
PO2 BLD: 81 MM HG (ref 80–105)
PO2 BLD: 83 MM HG (ref 80–105)
PO2 BLD: 85 MM HG (ref 80–105)
PO2 BLD: 85 MM HG (ref 80–105)
PO2 BLD: 88 MM HG (ref 80–105)
PO2 BLD: 89 MM HG (ref 80–105)
PO2 BLD: 91 MM HG (ref 80–105)
PO2 BLD: 93 MM HG (ref 80–105)
PO2 BLD: 93 MM HG (ref 80–105)
PO2 BLD: 94 MM HG (ref 80–105)
PO2 BLD: 95 MM HG (ref 80–105)
PO2 BLD: 96 MM HG (ref 80–105)
PO2 BLD: 97 MM HG (ref 80–105)
PO2 BLD: 99 MM HG (ref 80–105)
PO2 BLDA: 265 MM HG (ref 80–105)
PO2 BLDA: 267 MM HG (ref 80–105)
PO2 BLDA: 337 MM HG (ref 80–105)
PO2 BLDA: 358 MM HG (ref 80–105)
PO2 BLDA: 494 MM HG (ref 80–105)
PO2 BLDA: 68 MM HG (ref 80–105)
PO2 BLDA: 72 MM HG (ref 80–105)
PO2 BLDA: 72 MM HG (ref 80–105)
PO2 BLDV: 24 MM HG (ref 25–47)
PO2 BLDV: 26 MM HG (ref 25–47)
PO2 BLDV: 27 MM HG (ref 25–47)
PO2 BLDV: 29 MM HG (ref 25–47)
PO2 BLDV: 31 MM HG (ref 25–47)
PO2 BLDV: 31 MM HG (ref 25–47)
PO2 BLDV: 32 MM HG (ref 25–47)
PO2 BLDV: 33 MM HG (ref 25–47)
PO2 BLDV: 33 MM HG (ref 25–47)
PO2 BLDV: 35 MM HG (ref 25–47)
PO2 BLDV: 36 MM HG (ref 25–47)
PO2 BLDV: 41 MM HG (ref 25–47)
PO2 BLDV: 42 MM HG (ref 25–47)
PO2 BLDV: 49 MM HG (ref 25–47)
PO2 BLDV: 53 MM HG (ref 25–47)
PO2 BLDV: 55 MM HG (ref 25–47)
PO2 BLDV: 57 MM HG (ref 25–47)
POLYCHROMASIA BLD QL SMEAR: ABNORMAL
POTASSIUM BLD-SCNC: 3.1 MMOL/L (ref 3.5–5)
POTASSIUM BLD-SCNC: 3.1 MMOL/L (ref 3.5–5)
POTASSIUM BLD-SCNC: 3.5 MMOL/L (ref 3.5–5)
POTASSIUM BLD-SCNC: 3.6 MMOL/L (ref 3.5–5)
POTASSIUM BLD-SCNC: 3.7 MMOL/L (ref 3.5–5)
POTASSIUM BLD-SCNC: 4 MMOL/L (ref 3.5–5)
POTASSIUM BLD-SCNC: 4 MMOL/L (ref 3.5–5)
POTASSIUM BLD-SCNC: 4.1 MMOL/L (ref 3.5–5)
POTASSIUM BLD-SCNC: 4.4 MMOL/L (ref 3.4–5.3)
POTASSIUM BLD-SCNC: 4.5 MMOL/L (ref 3.5–5)
POTASSIUM BLD-SCNC: 4.8 MMOL/L (ref 3.5–5)
POTASSIUM BLD-SCNC: 5.3 MMOL/L (ref 3.5–5)
POTASSIUM SERPL-SCNC: 2.8 MMOL/L (ref 3.4–5.3)
POTASSIUM SERPL-SCNC: 2.9 MMOL/L (ref 3.4–5.3)
POTASSIUM SERPL-SCNC: 3 MMOL/L (ref 3.4–5.3)
POTASSIUM SERPL-SCNC: 3.1 MMOL/L (ref 3.4–5.3)
POTASSIUM SERPL-SCNC: 3.2 MMOL/L (ref 3.4–5.3)
POTASSIUM SERPL-SCNC: 3.3 MMOL/L (ref 3.4–5.3)
POTASSIUM SERPL-SCNC: 3.5 MMOL/L (ref 3.4–5.3)
POTASSIUM SERPL-SCNC: 3.6 MMOL/L (ref 3.4–5.3)
POTASSIUM SERPL-SCNC: 3.7 MMOL/L (ref 3.4–5.3)
POTASSIUM SERPL-SCNC: 3.8 MMOL/L (ref 3.4–5.3)
POTASSIUM SERPL-SCNC: 3.9 MMOL/L (ref 3.4–5.3)
POTASSIUM SERPL-SCNC: 3.9 MMOL/L (ref 3.4–5.3)
POTASSIUM SERPL-SCNC: 4 MMOL/L (ref 3.4–5.3)
POTASSIUM SERPL-SCNC: 4.1 MMOL/L (ref 3.4–5.3)
POTASSIUM SERPL-SCNC: 4.2 MMOL/L (ref 3.4–5.3)
POTASSIUM SERPL-SCNC: 4.3 MMOL/L (ref 3.4–5.3)
POTASSIUM SERPL-SCNC: 4.4 MMOL/L (ref 3.4–5.3)
POTASSIUM SERPL-SCNC: 4.5 MMOL/L (ref 3.4–5.3)
POTASSIUM SERPL-SCNC: 4.5 MMOL/L (ref 3.4–5.3)
POTASSIUM SERPL-SCNC: 4.6 MMOL/L (ref 3.4–5.3)
POTASSIUM SERPL-SCNC: 4.7 MMOL/L (ref 3.4–5.3)
POTASSIUM SERPL-SCNC: 4.7 MMOL/L (ref 3.4–5.3)
POTASSIUM SERPL-SCNC: 4.8 MMOL/L (ref 3.4–5.3)
POTASSIUM SERPL-SCNC: 5 MMOL/L (ref 3.4–5.3)
PR INTERVAL - MUSE: NORMAL MS
PROCALCITONIN SERPL IA-MCNC: 0.67 NG/ML
PROCALCITONIN SERPL IA-MCNC: 0.89 NG/ML
PROCALCITONIN SERPL IA-MCNC: 1.06 NG/ML
PROCALCITONIN SERPL IA-MCNC: 1.17 NG/ML
PROCALCITONIN SERPL IA-MCNC: 2.01 NG/ML
PROT SERPL-MCNC: 4.8 G/DL (ref 6.4–8.3)
PROT SERPL-MCNC: 5.1 G/DL (ref 6.4–8.3)
PROT SERPL-MCNC: 5.2 G/DL (ref 6.4–8.3)
PROT SERPL-MCNC: 5.2 G/DL (ref 6.4–8.3)
PROT SERPL-MCNC: 5.3 G/DL (ref 6.4–8.3)
PROT SERPL-MCNC: 5.4 G/DL (ref 6.4–8.3)
PROT SERPL-MCNC: 5.4 G/DL (ref 6.4–8.3)
PROT SERPL-MCNC: 5.5 G/DL (ref 6.4–8.3)
PROT SERPL-MCNC: 5.5 G/DL (ref 6.4–8.3)
PROT SERPL-MCNC: 5.6 G/DL (ref 6.4–8.3)
PROT SERPL-MCNC: 5.7 G/DL (ref 6.4–8.3)
PROT SERPL-MCNC: 5.8 G/DL (ref 6.4–8.3)
PROT SERPL-MCNC: 5.8 G/DL (ref 6.4–8.3)
PROT SERPL-MCNC: 5.9 G/DL (ref 6.4–8.3)
PROT SERPL-MCNC: 6 G/DL (ref 6.4–8.3)
PROT SERPL-MCNC: 6 G/DL (ref 6.4–8.3)
PROT SERPL-MCNC: 6.1 G/DL (ref 6.4–8.3)
PROT SERPL-MCNC: 6.2 G/DL (ref 6.4–8.3)
PROT SERPL-MCNC: 6.4 G/DL (ref 6.4–8.3)
PROT SERPL-MCNC: 6.6 G/DL (ref 6.4–8.3)
PROT SERPL-MCNC: 6.8 G/DL (ref 6.4–8.3)
PROT SERPL-MCNC: 6.8 G/DL (ref 6.4–8.3)
PROT SERPL-MCNC: 7 G/DL (ref 6.4–8.3)
PROT SERPL-MCNC: 7.1 G/DL (ref 6.4–8.3)
QRS DURATION - MUSE: 134 MS
QRS DURATION - MUSE: 148 MS
QRS DURATION - MUSE: 154 MS
QRS DURATION - MUSE: 158 MS
QRS DURATION - MUSE: 160 MS
QRS DURATION - MUSE: 162 MS
QRS DURATION - MUSE: 164 MS
QRS DURATION - MUSE: 164 MS
QRS DURATION - MUSE: 176 MS
QT - MUSE: 434 MS
QT - MUSE: 476 MS
QT - MUSE: 478 MS
QT - MUSE: 492 MS
QT - MUSE: 492 MS
QT - MUSE: 504 MS
QT - MUSE: 522 MS
QT - MUSE: 522 MS
QT - MUSE: 526 MS
QTC - MUSE: 478 MS
QTC - MUSE: 492 MS
QTC - MUSE: 495 MS
QTC - MUSE: 500 MS
QTC - MUSE: 522 MS
QTC - MUSE: 526 MS
QTC - MUSE: 528 MS
QTC - MUSE: 548 MS
QTC - MUSE: 602 MS
R AXIS - MUSE: 113 DEGREES
R AXIS - MUSE: 114 DEGREES
R AXIS - MUSE: 121 DEGREES
R AXIS - MUSE: 147 DEGREES
R AXIS - MUSE: 15 DEGREES
R AXIS - MUSE: 44 DEGREES
R AXIS - MUSE: 56 DEGREES
R AXIS - MUSE: 60 DEGREES
R AXIS - MUSE: 98 DEGREES
RBC # BLD AUTO: 2.45 10E6/UL (ref 3.8–5.2)
RBC # BLD AUTO: 2.52 10E6/UL (ref 3.8–5.2)
RBC # BLD AUTO: 2.55 10E6/UL (ref 3.8–5.2)
RBC # BLD AUTO: 2.56 10E6/UL (ref 3.8–5.2)
RBC # BLD AUTO: 2.56 10E6/UL (ref 3.8–5.2)
RBC # BLD AUTO: 2.61 10E6/UL (ref 3.8–5.2)
RBC # BLD AUTO: 2.64 10E6/UL (ref 3.8–5.2)
RBC # BLD AUTO: 2.67 10E6/UL (ref 3.8–5.2)
RBC # BLD AUTO: 2.69 10E6/UL (ref 3.8–5.2)
RBC # BLD AUTO: 2.69 10E6/UL (ref 3.8–5.2)
RBC # BLD AUTO: 2.78 10E6/UL (ref 3.8–5.2)
RBC # BLD AUTO: 2.94 10E6/UL (ref 3.8–5.2)
RBC # BLD AUTO: 3.01 10E6/UL (ref 3.8–5.2)
RBC # BLD AUTO: 3.03 10E6/UL (ref 3.8–5.2)
RBC # BLD AUTO: 3.05 10E6/UL (ref 3.8–5.2)
RBC # BLD AUTO: 3.05 10E6/UL (ref 3.8–5.2)
RBC # BLD AUTO: 3.07 10E6/UL (ref 3.8–5.2)
RBC # BLD AUTO: 3.26 10E6/UL (ref 3.8–5.2)
RBC # BLD AUTO: 3.27 10E6/UL (ref 3.8–5.2)
RBC # BLD AUTO: 3.29 10E6/UL (ref 3.8–5.2)
RBC # BLD AUTO: 3.31 10E6/UL (ref 3.8–5.2)
RBC # BLD AUTO: 3.32 10E6/UL (ref 3.8–5.2)
RBC # BLD AUTO: 3.35 10E6/UL (ref 3.8–5.2)
RBC # BLD AUTO: 3.36 10E6/UL (ref 3.8–5.2)
RBC # BLD AUTO: 3.63 10E6/UL (ref 3.8–5.2)
RBC # BLD AUTO: 3.83 10E6/UL (ref 3.8–5.2)
RBC # BLD AUTO: 3.83 10E6/UL (ref 3.8–5.2)
RBC # BLD AUTO: 3.85 10E6/UL (ref 3.8–5.2)
RBC # BLD AUTO: 3.86 10E6/UL (ref 3.8–5.2)
RBC # BLD AUTO: 3.94 10E6/UL (ref 3.8–5.2)
RBC # BLD AUTO: 3.96 10E6/UL (ref 3.8–5.2)
RBC # BLD AUTO: 3.96 10E6/UL (ref 3.8–5.2)
RBC # BLD AUTO: 4.02 10E6/UL (ref 3.8–5.2)
RBC # BLD AUTO: 4.04 10E6/UL (ref 3.8–5.2)
RBC # BLD AUTO: 4.07 10E6/UL (ref 3.8–5.2)
RBC # BLD AUTO: 4.08 10E6/UL (ref 3.8–5.2)
RBC # BLD AUTO: 4.18 10E6/UL (ref 3.8–5.2)
RBC # BLD AUTO: 4.21 10E6/UL (ref 3.8–5.2)
RBC # BLD AUTO: 4.24 10E6/UL (ref 3.8–5.2)
RBC # BLD AUTO: 4.32 10E6/UL (ref 3.8–5.2)
RBC # BLD AUTO: 4.42 10E6/UL (ref 3.8–5.2)
RBC # BLD AUTO: 4.49 10E6/UL (ref 3.8–5.2)
RBC # BLD AUTO: 4.58 10E6/UL (ref 3.8–5.2)
RBC # BLD AUTO: 4.75 10E6/UL (ref 3.8–5.2)
RBC #/AREA URNS AUTO: ABNORMAL /HPF
RBC AGGLUT BLD QL: ABNORMAL
RBC MORPH BLD: ABNORMAL
RBC MORPH BLD: NORMAL
RBC URINE: 0 /HPF
RBC URINE: 1 /HPF
RBC URINE: 1 /HPF
RBC URINE: 16 /HPF
RBC URINE: 24 /HPF
RBC URINE: <1 /HPF
ROULEAUX BLD QL SMEAR: ABNORMAL
RSV RNA SPEC NAA+PROBE: NEGATIVE
RSV RNA SPEC QL NAA+PROBE: NOT DETECTED
RSV RNA SPEC QL NAA+PROBE: NOT DETECTED
RV+EV RNA SPEC QL NAA+PROBE: NOT DETECTED
SA TARGET DNA: NEGATIVE
SA TARGET DNA: NEGATIVE
SAO2 % BLDV: 54.5 % (ref 70–75)
SAO2 % BLDV: 59 % (ref 94–100)
SARS-COV-2 RNA RESP QL NAA+PROBE: NEGATIVE
SARS-COV-2 RNA RESP QL NAA+PROBE: NEGATIVE
SATV LHE POCT: 53.3 % (ref 70–75)
SATV LHE POCT: 56.6 % (ref 70–75)
SATV LHE POCT: 57.8 % (ref 70–75)
SICKLE CELLS BLD QL SMEAR: ABNORMAL
SMUDGE CELLS BLD QL SMEAR: ABNORMAL
SODIUM BLD-SCNC: 137 MMOL/L (ref 135–145)
SODIUM BLD-SCNC: 138 MMOL/L (ref 135–145)
SODIUM BLD-SCNC: 138 MMOL/L (ref 135–145)
SODIUM BLD-SCNC: 139 MMOL/L (ref 135–145)
SODIUM BLD-SCNC: 141 MMOL/L (ref 133–144)
SODIUM BLD-SCNC: 141 MMOL/L (ref 135–145)
SODIUM BLD-SCNC: 142 MMOL/L (ref 135–145)
SODIUM SERPL-SCNC: 132 MMOL/L (ref 136–145)
SODIUM SERPL-SCNC: 134 MMOL/L (ref 136–145)
SODIUM SERPL-SCNC: 135 MMOL/L (ref 136–145)
SODIUM SERPL-SCNC: 137 MMOL/L (ref 136–145)
SODIUM SERPL-SCNC: 138 MMOL/L (ref 135–145)
SODIUM SERPL-SCNC: 139 MMOL/L (ref 135–145)
SODIUM SERPL-SCNC: 139 MMOL/L (ref 136–145)
SODIUM SERPL-SCNC: 139 MMOL/L (ref 136–145)
SODIUM SERPL-SCNC: 140 MMOL/L (ref 135–145)
SODIUM SERPL-SCNC: 140 MMOL/L (ref 135–145)
SODIUM SERPL-SCNC: 140 MMOL/L (ref 136–145)
SODIUM SERPL-SCNC: 140 MMOL/L (ref 136–145)
SODIUM SERPL-SCNC: 141 MMOL/L (ref 135–145)
SODIUM SERPL-SCNC: 141 MMOL/L (ref 136–145)
SODIUM SERPL-SCNC: 142 MMOL/L (ref 135–145)
SODIUM SERPL-SCNC: 142 MMOL/L (ref 136–145)
SODIUM SERPL-SCNC: 143 MMOL/L (ref 135–145)
SODIUM SERPL-SCNC: 144 MMOL/L (ref 135–145)
SODIUM SERPL-SCNC: 145 MMOL/L (ref 135–145)
SODIUM SERPL-SCNC: 146 MMOL/L (ref 135–145)
SODIUM SERPL-SCNC: 147 MMOL/L (ref 135–145)
SODIUM SERPL-SCNC: 148 MMOL/L (ref 135–145)
SODIUM SERPL-SCNC: 149 MMOL/L (ref 135–145)
SODIUM SERPL-SCNC: 150 MMOL/L (ref 135–145)
SODIUM SERPL-SCNC: 151 MMOL/L (ref 135–145)
SODIUM SERPL-SCNC: 151 MMOL/L (ref 135–145)
SODIUM SERPL-SCNC: 152 MMOL/L (ref 135–145)
SP GR UR STRIP: 1.01 (ref 1–1.03)
SP GR UR STRIP: 1.02 (ref 1–1.03)
SP GR UR STRIP: 1.02 (ref 1–1.03)
SPECIMEN EXPIRATION DATE: NORMAL
SPHEROCYTES BLD QL SMEAR: ABNORMAL
SQUAMOUS #/AREA URNS AUTO: ABNORMAL /LPF
SQUAMOUS EPITHELIAL: 1 /HPF
SQUAMOUS EPITHELIAL: 2 /HPF
SQUAMOUS EPITHELIAL: <1 /HPF
STOMATOCYTES BLD QL SMEAR: ABNORMAL
SYSTOLIC BLOOD PRESSURE - MUSE: NORMAL MMHG
T AXIS - MUSE: -1 DEGREES
T AXIS - MUSE: -20 DEGREES
T AXIS - MUSE: -40 DEGREES
T AXIS - MUSE: -68 DEGREES
T AXIS - MUSE: 157 DEGREES
T AXIS - MUSE: 162 DEGREES
T AXIS - MUSE: 25 DEGREES
T AXIS - MUSE: 32 DEGREES
T AXIS - MUSE: 46 DEGREES
TARGETS BLD QL SMEAR: ABNORMAL
TOXIC GRANULES BLD QL SMEAR: ABNORMAL
TRANSITIONAL EPI: <1 /HPF
TROPONIN T SERPL HS-MCNC: 20 NG/L
TROPONIN T SERPL HS-MCNC: 20 NG/L
TROPONIN T SERPL HS-MCNC: 243 NG/L
TROPONIN T SERPL HS-MCNC: 249 NG/L
UNIT ABO/RH: NORMAL
UNIT NUMBER: NORMAL
UNIT STATUS: NORMAL
UNIT TYPE ISBT: 7300
URATE SERPL-MCNC: 7 MG/DL (ref 2.4–5.7)
URATE SERPL-MCNC: 7.9 MG/DL (ref 2.4–5.7)
UROBILINOGEN UR STRIP-ACNC: 0.2 E.U./DL
UROBILINOGEN UR STRIP-ACNC: 0.2 E.U./DL
UROBILINOGEN UR STRIP-MCNC: NORMAL MG/DL
VARIANT LYMPHS BLD QL SMEAR: ABNORMAL
VENTRICULAR RATE- MUSE: 60 BPM
VENTRICULAR RATE- MUSE: 61 BPM
VENTRICULAR RATE- MUSE: 66 BPM
VENTRICULAR RATE- MUSE: 80 BPM
VIT B12 SERPL-MCNC: 1342 PG/ML (ref 232–1245)
WBC # BLD AUTO: 10.2 10E3/UL (ref 4–11)
WBC # BLD AUTO: 10.2 10E3/UL (ref 4–11)
WBC # BLD AUTO: 10.7 10E3/UL (ref 4–11)
WBC # BLD AUTO: 11.4 10E3/UL (ref 4–11)
WBC # BLD AUTO: 11.7 10E3/UL (ref 4–11)
WBC # BLD AUTO: 11.9 10E3/UL (ref 4–11)
WBC # BLD AUTO: 11.9 10E3/UL (ref 4–11)
WBC # BLD AUTO: 12.3 10E3/UL (ref 4–11)
WBC # BLD AUTO: 12.4 10E3/UL (ref 4–11)
WBC # BLD AUTO: 13.8 10E3/UL (ref 4–11)
WBC # BLD AUTO: 14.2 10E3/UL (ref 4–11)
WBC # BLD AUTO: 14.2 10E3/UL (ref 4–11)
WBC # BLD AUTO: 14.7 10E3/UL (ref 4–11)
WBC # BLD AUTO: 16.4 10E3/UL (ref 4–11)
WBC # BLD AUTO: 16.5 10E3/UL (ref 4–11)
WBC # BLD AUTO: 16.7 10E3/UL (ref 4–11)
WBC # BLD AUTO: 16.9 10E3/UL (ref 4–11)
WBC # BLD AUTO: 18 10E3/UL (ref 4–11)
WBC # BLD AUTO: 18.1 10E3/UL (ref 4–11)
WBC # BLD AUTO: 18.3 10E3/UL (ref 4–11)
WBC # BLD AUTO: 20.6 10E3/UL (ref 4–11)
WBC # BLD AUTO: 23.8 10E3/UL (ref 4–11)
WBC # BLD AUTO: 27.3 10E3/UL (ref 4–11)
WBC # BLD AUTO: 3 10E3/UL (ref 4–11)
WBC # BLD AUTO: 5 10E3/UL (ref 4–11)
WBC # BLD AUTO: 5.1 10E3/UL (ref 4–11)
WBC # BLD AUTO: 5.3 10E3/UL (ref 4–11)
WBC # BLD AUTO: 5.6 10E3/UL (ref 4–11)
WBC # BLD AUTO: 5.6 10E3/UL (ref 4–11)
WBC # BLD AUTO: 5.7 10E3/UL (ref 4–11)
WBC # BLD AUTO: 5.8 10E3/UL (ref 4–11)
WBC # BLD AUTO: 6 10E3/UL (ref 4–11)
WBC # BLD AUTO: 6.1 10E3/UL (ref 4–11)
WBC # BLD AUTO: 6.2 10E3/UL (ref 4–11)
WBC # BLD AUTO: 6.2 10E3/UL (ref 4–11)
WBC # BLD AUTO: 6.3 10E3/UL (ref 4–11)
WBC # BLD AUTO: 6.5 10E3/UL (ref 4–11)
WBC # BLD AUTO: 7.1 10E3/UL (ref 4–11)
WBC # BLD AUTO: 7.2 10E3/UL (ref 4–11)
WBC # BLD AUTO: 8 10E3/UL (ref 4–11)
WBC # BLD AUTO: 8.6 10E3/UL (ref 4–11)
WBC # BLD AUTO: 8.9 10E3/UL (ref 4–11)
WBC #/AREA URNS AUTO: ABNORMAL /HPF
WBC CLUMPS #/AREA URNS HPF: PRESENT /HPF
WBC URINE: 1 /HPF
WBC URINE: 14 /HPF
WBC URINE: 2 /HPF
WBC URINE: 4 /HPF
WBC URINE: 50 /HPF
WBC URINE: <1 /HPF

## 2023-01-01 PROCEDURE — 85610 PROTHROMBIN TIME: CPT

## 2023-01-01 PROCEDURE — 71045 X-RAY EXAM CHEST 1 VIEW: CPT | Mod: 77

## 2023-01-01 PROCEDURE — 92526 ORAL FUNCTION THERAPY: CPT | Mod: GN | Performed by: SPEECH-LANGUAGE PATHOLOGIST

## 2023-01-01 PROCEDURE — 36416 COLLJ CAPILLARY BLOOD SPEC: CPT

## 2023-01-01 PROCEDURE — 97116 GAIT TRAINING THERAPY: CPT | Mod: GP

## 2023-01-01 PROCEDURE — 84100 ASSAY OF PHOSPHORUS: CPT | Performed by: SURGERY

## 2023-01-01 PROCEDURE — 75565 CARD MRI VELOC FLOW MAPPING: CPT | Mod: 26 | Performed by: INTERNAL MEDICINE

## 2023-01-01 PROCEDURE — 36415 COLL VENOUS BLD VENIPUNCTURE: CPT

## 2023-01-01 PROCEDURE — 250N000013 HC RX MED GY IP 250 OP 250 PS 637: Performed by: PHYSICIAN ASSISTANT

## 2023-01-01 PROCEDURE — 250N000011 HC RX IP 250 OP 636: Mod: JZ | Performed by: STUDENT IN AN ORGANIZED HEALTH CARE EDUCATION/TRAINING PROGRAM

## 2023-01-01 PROCEDURE — 258N000003 HC RX IP 258 OP 636: Performed by: ANESTHESIOLOGY

## 2023-01-01 PROCEDURE — 74230 X-RAY XM SWLNG FUNCJ C+: CPT

## 2023-01-01 PROCEDURE — C1894 INTRO/SHEATH, NON-LASER: HCPCS | Performed by: INTERNAL MEDICINE

## 2023-01-01 PROCEDURE — 85610 PROTHROMBIN TIME: CPT | Performed by: INTERNAL MEDICINE

## 2023-01-01 PROCEDURE — 99232 SBSQ HOSP IP/OBS MODERATE 35: CPT | Performed by: NURSE PRACTITIONER

## 2023-01-01 PROCEDURE — 86901 BLOOD TYPING SEROLOGIC RH(D): CPT | Performed by: SURGERY

## 2023-01-01 PROCEDURE — 250N000013 HC RX MED GY IP 250 OP 250 PS 637

## 2023-01-01 PROCEDURE — 80048 BASIC METABOLIC PNL TOTAL CA: CPT

## 2023-01-01 PROCEDURE — 250N000013 HC RX MED GY IP 250 OP 250 PS 637: Performed by: SURGERY

## 2023-01-01 PROCEDURE — 94003 VENT MGMT INPAT SUBQ DAY: CPT

## 2023-01-01 PROCEDURE — 87324 CLOSTRIDIUM AG IA: CPT | Performed by: PHYSICIAN ASSISTANT

## 2023-01-01 PROCEDURE — 94640 AIRWAY INHALATION TREATMENT: CPT | Mod: 76

## 2023-01-01 PROCEDURE — 120N000003 HC R&B IMCU UMMC

## 2023-01-01 PROCEDURE — 250N000011 HC RX IP 250 OP 636

## 2023-01-01 PROCEDURE — 250N000011 HC RX IP 250 OP 636: Performed by: SURGERY

## 2023-01-01 PROCEDURE — 250N000011 HC RX IP 250 OP 636: Mod: JZ

## 2023-01-01 PROCEDURE — 85027 COMPLETE CBC AUTOMATED: CPT

## 2023-01-01 PROCEDURE — 71045 X-RAY EXAM CHEST 1 VIEW: CPT

## 2023-01-01 PROCEDURE — 250N000011 HC RX IP 250 OP 636: Performed by: INTERNAL MEDICINE

## 2023-01-01 PROCEDURE — 200N000002 HC R&B ICU UMMC

## 2023-01-01 PROCEDURE — 250N000011 HC RX IP 250 OP 636: Mod: JZ | Performed by: SURGERY

## 2023-01-01 PROCEDURE — 250N000009 HC RX 250

## 2023-01-01 PROCEDURE — 92611 MOTION FLUOROSCOPY/SWALLOW: CPT | Mod: GN | Performed by: SPEECH-LANGUAGE PATHOLOGIST

## 2023-01-01 PROCEDURE — 370N000017 HC ANESTHESIA TECHNICAL FEE, PER MIN: Performed by: SURGERY

## 2023-01-01 PROCEDURE — 120N000001 HC R&B MED SURG/OB

## 2023-01-01 PROCEDURE — 83605 ASSAY OF LACTIC ACID: CPT | Performed by: STUDENT IN AN ORGANIZED HEALTH CARE EDUCATION/TRAINING PROGRAM

## 2023-01-01 PROCEDURE — 92597 ORAL SPEECH DEVICE EVAL: CPT | Mod: GN | Performed by: SPEECH-LANGUAGE PATHOLOGIST

## 2023-01-01 PROCEDURE — 94799 UNLISTED PULMONARY SVC/PX: CPT

## 2023-01-01 PROCEDURE — 84100 ASSAY OF PHOSPHORUS: CPT

## 2023-01-01 PROCEDURE — 120N000017 HC R&B RESPIRATORY CARE

## 2023-01-01 PROCEDURE — 999N000157 HC STATISTIC RCP TIME EA 10 MIN

## 2023-01-01 PROCEDURE — 250N000024 HC ISOFLURANE, PER MIN: Performed by: SURGERY

## 2023-01-01 PROCEDURE — 250N000013 HC RX MED GY IP 250 OP 250 PS 637: Performed by: INTERNAL MEDICINE

## 2023-01-01 PROCEDURE — C9113 INJ PANTOPRAZOLE SODIUM, VIA: HCPCS | Performed by: SURGERY

## 2023-01-01 PROCEDURE — 83735 ASSAY OF MAGNESIUM: CPT | Performed by: STUDENT IN AN ORGANIZED HEALTH CARE EDUCATION/TRAINING PROGRAM

## 2023-01-01 PROCEDURE — 82977 ASSAY OF GGT: CPT

## 2023-01-01 PROCEDURE — 93312 ECHO TRANSESOPHAGEAL: CPT | Mod: 26 | Performed by: INTERNAL MEDICINE

## 2023-01-01 PROCEDURE — 93325 DOPPLER ECHO COLOR FLOW MAPG: CPT

## 2023-01-01 PROCEDURE — 82805 BLOOD GASES W/O2 SATURATION: CPT | Performed by: SURGERY

## 2023-01-01 PROCEDURE — 83605 ASSAY OF LACTIC ACID: CPT | Performed by: NURSE PRACTITIONER

## 2023-01-01 PROCEDURE — 5A1221Z PERFORMANCE OF CARDIAC OUTPUT, CONTINUOUS: ICD-10-PCS | Performed by: INTERNAL MEDICINE

## 2023-01-01 PROCEDURE — 83735 ASSAY OF MAGNESIUM: CPT

## 2023-01-01 PROCEDURE — 93005 ELECTROCARDIOGRAM TRACING: CPT

## 2023-01-01 PROCEDURE — 82805 BLOOD GASES W/O2 SATURATION: CPT | Performed by: PHYSICIAN ASSISTANT

## 2023-01-01 PROCEDURE — 250N000013 HC RX MED GY IP 250 OP 250 PS 637: Performed by: STUDENT IN AN ORGANIZED HEALTH CARE EDUCATION/TRAINING PROGRAM

## 2023-01-01 PROCEDURE — 71045 X-RAY EXAM CHEST 1 VIEW: CPT | Mod: 26 | Performed by: RADIOLOGY

## 2023-01-01 PROCEDURE — 99207 PR NO CHARGE-RESEARCH SERVICE: CPT

## 2023-01-01 PROCEDURE — 94660 CPAP INITIATION&MGMT: CPT

## 2023-01-01 PROCEDURE — 94003 VENT MGMT INPAT SUBQ DAY: CPT | Performed by: NURSE PRACTITIONER

## 2023-01-01 PROCEDURE — 85014 HEMATOCRIT: CPT

## 2023-01-01 PROCEDURE — 84484 ASSAY OF TROPONIN QUANT: CPT | Performed by: PHYSICIAN ASSISTANT

## 2023-01-01 PROCEDURE — 250N000009 HC RX 250: Performed by: SURGERY

## 2023-01-01 PROCEDURE — 83735 ASSAY OF MAGNESIUM: CPT | Performed by: HOSPITALIST

## 2023-01-01 PROCEDURE — 82330 ASSAY OF CALCIUM: CPT

## 2023-01-01 PROCEDURE — 82607 VITAMIN B-12: CPT | Performed by: INTERNAL MEDICINE

## 2023-01-01 PROCEDURE — 250N000009 HC RX 250: Performed by: STUDENT IN AN ORGANIZED HEALTH CARE EDUCATION/TRAINING PROGRAM

## 2023-01-01 PROCEDURE — 258N000003 HC RX IP 258 OP 636: Performed by: SURGERY

## 2023-01-01 PROCEDURE — 250N000013 HC RX MED GY IP 250 OP 250 PS 637: Performed by: HOSPITALIST

## 2023-01-01 PROCEDURE — 83735 ASSAY OF MAGNESIUM: CPT | Performed by: SURGERY

## 2023-01-01 PROCEDURE — 999N000123 HC STATISTIC OXYGEN O2DAILY TECH TIME

## 2023-01-01 PROCEDURE — 99213 OFFICE O/P EST LOW 20 MIN: CPT | Mod: 25 | Performed by: NURSE PRACTITIONER

## 2023-01-01 PROCEDURE — 999N000009 HC STATISTIC AIRWAY CARE

## 2023-01-01 PROCEDURE — 999N000065 XR CHEST PORT 1 VIEW

## 2023-01-01 PROCEDURE — 82330 ASSAY OF CALCIUM: CPT | Performed by: SURGERY

## 2023-01-01 PROCEDURE — 250N000024 HC ISOFLURANE, PER MIN: Performed by: INTERNAL MEDICINE

## 2023-01-01 PROCEDURE — 80048 BASIC METABOLIC PNL TOTAL CA: CPT | Performed by: PHYSICIAN ASSISTANT

## 2023-01-01 PROCEDURE — 71250 CT THORAX DX C-: CPT

## 2023-01-01 PROCEDURE — 84132 ASSAY OF SERUM POTASSIUM: CPT | Performed by: HOSPITALIST

## 2023-01-01 PROCEDURE — 80053 COMPREHEN METABOLIC PANEL: CPT

## 2023-01-01 PROCEDURE — 360N000076 HC SURGERY LEVEL 3, PER MIN: Performed by: OTOLARYNGOLOGY

## 2023-01-01 PROCEDURE — 99221 1ST HOSP IP/OBS SF/LOW 40: CPT | Performed by: PHYSICIAN ASSISTANT

## 2023-01-01 PROCEDURE — 82803 BLOOD GASES ANY COMBINATION: CPT

## 2023-01-01 PROCEDURE — 94640 AIRWAY INHALATION TREATMENT: CPT

## 2023-01-01 PROCEDURE — 83880 ASSAY OF NATRIURETIC PEPTIDE: CPT | Performed by: STUDENT IN AN ORGANIZED HEALTH CARE EDUCATION/TRAINING PROGRAM

## 2023-01-01 PROCEDURE — 99213 OFFICE O/P EST LOW 20 MIN: CPT | Performed by: SURGERY

## 2023-01-01 PROCEDURE — 360N000075 HC SURGERY LEVEL 2, PER MIN: Performed by: INTERNAL MEDICINE

## 2023-01-01 PROCEDURE — 97530 THERAPEUTIC ACTIVITIES: CPT | Mod: GP

## 2023-01-01 PROCEDURE — 93308 TTE F-UP OR LMTD: CPT | Mod: 26 | Performed by: INTERNAL MEDICINE

## 2023-01-01 PROCEDURE — 250N000009 HC RX 250: Performed by: NURSE PRACTITIONER

## 2023-01-01 PROCEDURE — 75561 CARDIAC MRI FOR MORPH W/DYE: CPT | Mod: 26 | Performed by: INTERNAL MEDICINE

## 2023-01-01 PROCEDURE — 97535 SELF CARE MNGMENT TRAINING: CPT | Mod: GO

## 2023-01-01 PROCEDURE — 83605 ASSAY OF LACTIC ACID: CPT

## 2023-01-01 PROCEDURE — 83735 ASSAY OF MAGNESIUM: CPT | Performed by: INTERNAL MEDICINE

## 2023-01-01 PROCEDURE — 80048 BASIC METABOLIC PNL TOTAL CA: CPT | Performed by: SURGERY

## 2023-01-01 PROCEDURE — 82805 BLOOD GASES W/O2 SATURATION: CPT

## 2023-01-01 PROCEDURE — 36591 DRAW BLOOD OFF VENOUS DEVICE: CPT

## 2023-01-01 PROCEDURE — 94002 VENT MGMT INPAT INIT DAY: CPT

## 2023-01-01 PROCEDURE — 258N000003 HC RX IP 258 OP 636: Performed by: STUDENT IN AN ORGANIZED HEALTH CARE EDUCATION/TRAINING PROGRAM

## 2023-01-01 PROCEDURE — 250N000011 HC RX IP 250 OP 636: Performed by: HOSPITALIST

## 2023-01-01 PROCEDURE — 250N000013 HC RX MED GY IP 250 OP 250 PS 637: Performed by: FAMILY MEDICINE

## 2023-01-01 PROCEDURE — 250N000011 HC RX IP 250 OP 636: Mod: JZ | Performed by: NURSE ANESTHETIST, CERTIFIED REGISTERED

## 2023-01-01 PROCEDURE — 71045 X-RAY EXAM CHEST 1 VIEW: CPT | Mod: 26 | Performed by: STUDENT IN AN ORGANIZED HEALTH CARE EDUCATION/TRAINING PROGRAM

## 2023-01-01 PROCEDURE — 97110 THERAPEUTIC EXERCISES: CPT | Mod: GP

## 2023-01-01 PROCEDURE — 250N000011 HC RX IP 250 OP 636: Mod: JZ | Performed by: NURSE PRACTITIONER

## 2023-01-01 PROCEDURE — 93456 R HRT CORONARY ARTERY ANGIO: CPT | Performed by: INTERNAL MEDICINE

## 2023-01-01 PROCEDURE — 250N000009 HC RX 250: Performed by: INTERNAL MEDICINE

## 2023-01-01 PROCEDURE — 99214 OFFICE O/P EST MOD 30 MIN: CPT | Mod: 25 | Performed by: INTERNAL MEDICINE

## 2023-01-01 PROCEDURE — 87040 BLOOD CULTURE FOR BACTERIA: CPT | Performed by: STUDENT IN AN ORGANIZED HEALTH CARE EDUCATION/TRAINING PROGRAM

## 2023-01-01 PROCEDURE — 84450 TRANSFERASE (AST) (SGOT): CPT

## 2023-01-01 PROCEDURE — 01996 DLY HOSP MGMT EDRL RX ADMIN: CPT | Performed by: ANESTHESIOLOGY

## 2023-01-01 PROCEDURE — 70450 CT HEAD/BRAIN W/O DYE: CPT | Mod: 26 | Performed by: RADIOLOGY

## 2023-01-01 PROCEDURE — 75561 CARDIAC MRI FOR MORPH W/DYE: CPT

## 2023-01-01 PROCEDURE — 85384 FIBRINOGEN ACTIVITY: CPT | Performed by: STUDENT IN AN ORGANIZED HEALTH CARE EDUCATION/TRAINING PROGRAM

## 2023-01-01 PROCEDURE — 93279 PRGRMG DEV EVAL PM/LDLS PM: CPT | Performed by: INTERNAL MEDICINE

## 2023-01-01 PROCEDURE — 250N000013 HC RX MED GY IP 250 OP 250 PS 637: Performed by: NURSE PRACTITIONER

## 2023-01-01 PROCEDURE — 36415 COLL VENOUS BLD VENIPUNCTURE: CPT | Performed by: PHYSICIAN ASSISTANT

## 2023-01-01 PROCEDURE — 410N000004: Performed by: SURGERY

## 2023-01-01 PROCEDURE — 99222 1ST HOSP IP/OBS MODERATE 55: CPT | Performed by: NURSE PRACTITIONER

## 2023-01-01 PROCEDURE — 99231 SBSQ HOSP IP/OBS SF/LOW 25: CPT | Performed by: PHYSICIAN ASSISTANT

## 2023-01-01 PROCEDURE — 97110 THERAPEUTIC EXERCISES: CPT | Mod: GO | Performed by: OCCUPATIONAL THERAPIST

## 2023-01-01 PROCEDURE — 87581 M.PNEUMON DNA AMP PROBE: CPT | Performed by: PHYSICIAN ASSISTANT

## 2023-01-01 PROCEDURE — 258N000003 HC RX IP 258 OP 636: Performed by: PHYSICIAN ASSISTANT

## 2023-01-01 PROCEDURE — 272N000001 HC OR GENERAL SUPPLY STERILE: Performed by: INTERNAL MEDICINE

## 2023-01-01 PROCEDURE — 410N000003 HC PER-PERFUSION 1ST 30 MIN: Performed by: SURGERY

## 2023-01-01 PROCEDURE — 84100 ASSAY OF PHOSPHORUS: CPT | Performed by: STUDENT IN AN ORGANIZED HEALTH CARE EDUCATION/TRAINING PROGRAM

## 2023-01-01 PROCEDURE — 82550 ASSAY OF CK (CPK): CPT | Mod: 90

## 2023-01-01 PROCEDURE — 92526 ORAL FUNCTION THERAPY: CPT | Mod: GN

## 2023-01-01 PROCEDURE — 84132 ASSAY OF SERUM POTASSIUM: CPT | Performed by: SURGERY

## 2023-01-01 PROCEDURE — 99233 SBSQ HOSP IP/OBS HIGH 50: CPT | Performed by: INTERNAL MEDICINE

## 2023-01-01 PROCEDURE — 0JPT3PZ REMOVAL OF CARDIAC RHYTHM RELATED DEVICE FROM TRUNK SUBCUTANEOUS TISSUE AND FASCIA, PERCUTANEOUS APPROACH: ICD-10-PCS | Performed by: INTERNAL MEDICINE

## 2023-01-01 PROCEDURE — 99000 SPECIMEN HANDLING OFFICE-LAB: CPT | Performed by: PATHOLOGY

## 2023-01-01 PROCEDURE — 84295 ASSAY OF SERUM SODIUM: CPT

## 2023-01-01 PROCEDURE — 36415 COLL VENOUS BLD VENIPUNCTURE: CPT | Performed by: INTERNAL MEDICINE

## 2023-01-01 PROCEDURE — 87086 URINE CULTURE/COLONY COUNT: CPT | Performed by: STUDENT IN AN ORGANIZED HEALTH CARE EDUCATION/TRAINING PROGRAM

## 2023-01-01 PROCEDURE — C1751 CATH, INF, PER/CENT/MIDLINE: HCPCS | Performed by: INTERNAL MEDICINE

## 2023-01-01 PROCEDURE — 97535 SELF CARE MNGMENT TRAINING: CPT | Mod: GO | Performed by: OCCUPATIONAL THERAPIST

## 2023-01-01 PROCEDURE — 36415 COLL VENOUS BLD VENIPUNCTURE: CPT | Performed by: STUDENT IN AN ORGANIZED HEALTH CARE EDUCATION/TRAINING PROGRAM

## 2023-01-01 PROCEDURE — 86900 BLOOD TYPING SEROLOGIC ABO: CPT | Performed by: STUDENT IN AN ORGANIZED HEALTH CARE EDUCATION/TRAINING PROGRAM

## 2023-01-01 PROCEDURE — 87640 STAPH A DNA AMP PROBE: CPT | Performed by: STUDENT IN AN ORGANIZED HEALTH CARE EDUCATION/TRAINING PROGRAM

## 2023-01-01 PROCEDURE — 258N000003 HC RX IP 258 OP 636

## 2023-01-01 PROCEDURE — 80048 BASIC METABOLIC PNL TOTAL CA: CPT | Performed by: INTERNAL MEDICINE

## 2023-01-01 PROCEDURE — 92507 TX SP LANG VOICE COMM INDIV: CPT | Mod: GN | Performed by: SPEECH-LANGUAGE PATHOLOGIST

## 2023-01-01 PROCEDURE — 99233 SBSQ HOSP IP/OBS HIGH 50: CPT | Performed by: HOSPITALIST

## 2023-01-01 PROCEDURE — 85025 COMPLETE CBC W/AUTO DIFF WBC: CPT | Performed by: HOSPITALIST

## 2023-01-01 PROCEDURE — 360N000079 HC SURGERY LEVEL 6, PER MIN: Performed by: SURGERY

## 2023-01-01 PROCEDURE — P9047 ALBUMIN (HUMAN), 25%, 50ML: HCPCS

## 2023-01-01 PROCEDURE — 80048 BASIC METABOLIC PNL TOTAL CA: CPT | Performed by: STUDENT IN AN ORGANIZED HEALTH CARE EDUCATION/TRAINING PROGRAM

## 2023-01-01 PROCEDURE — 99291 CRITICAL CARE FIRST HOUR: CPT | Mod: 24 | Performed by: SURGERY

## 2023-01-01 PROCEDURE — 93296 REM INTERROG EVL PM/IDS: CPT | Performed by: INTERNAL MEDICINE

## 2023-01-01 PROCEDURE — 97162 PT EVAL MOD COMPLEX 30 MIN: CPT | Mod: GP

## 2023-01-01 PROCEDURE — 86140 C-REACTIVE PROTEIN: CPT | Performed by: PHYSICIAN ASSISTANT

## 2023-01-01 PROCEDURE — 999N000104 HC STATISTIC NO CHARGE: Performed by: INTERNAL MEDICINE

## 2023-01-01 PROCEDURE — 71046 X-RAY EXAM CHEST 2 VIEWS: CPT | Mod: 26 | Performed by: RADIOLOGY

## 2023-01-01 PROCEDURE — 271N000002 HC RX 271: Performed by: SURGERY

## 2023-01-01 PROCEDURE — 250N000011 HC RX IP 250 OP 636: Performed by: STUDENT IN AN ORGANIZED HEALTH CARE EDUCATION/TRAINING PROGRAM

## 2023-01-01 PROCEDURE — 99205 OFFICE O/P NEW HI 60 MIN: CPT | Performed by: INTERNAL MEDICINE

## 2023-01-01 PROCEDURE — 36415 COLL VENOUS BLD VENIPUNCTURE: CPT | Performed by: NURSE PRACTITIONER

## 2023-01-01 PROCEDURE — 36415 COLL VENOUS BLD VENIPUNCTURE: CPT | Performed by: SURGERY

## 2023-01-01 PROCEDURE — 74018 RADEX ABDOMEN 1 VIEW: CPT | Mod: 26 | Performed by: STUDENT IN AN ORGANIZED HEALTH CARE EDUCATION/TRAINING PROGRAM

## 2023-01-01 PROCEDURE — 82310 ASSAY OF CALCIUM: CPT | Performed by: STUDENT IN AN ORGANIZED HEALTH CARE EDUCATION/TRAINING PROGRAM

## 2023-01-01 PROCEDURE — 02HK3NZ INSERTION OF INTRACARDIAC PACEMAKER INTO RIGHT VENTRICLE, PERCUTANEOUS APPROACH: ICD-10-PCS | Performed by: INTERNAL MEDICINE

## 2023-01-01 PROCEDURE — 84460 ALANINE AMINO (ALT) (SGPT): CPT

## 2023-01-01 PROCEDURE — 84145 PROCALCITONIN (PCT): CPT

## 2023-01-01 PROCEDURE — 93321 DOPPLER ECHO F-UP/LMTD STD: CPT

## 2023-01-01 PROCEDURE — 250N000011 HC RX IP 250 OP 636: Performed by: NURSE PRACTITIONER

## 2023-01-01 PROCEDURE — 80053 COMPREHEN METABOLIC PANEL: CPT | Performed by: PATHOLOGY

## 2023-01-01 PROCEDURE — 99205 OFFICE O/P NEW HI 60 MIN: CPT | Mod: 95 | Performed by: NURSE PRACTITIONER

## 2023-01-01 PROCEDURE — 87186 SC STD MICRODIL/AGAR DIL: CPT | Performed by: INTERNAL MEDICINE

## 2023-01-01 PROCEDURE — 85018 HEMOGLOBIN: CPT | Performed by: SURGERY

## 2023-01-01 PROCEDURE — 250N000009 HC RX 250: Performed by: NURSE ANESTHETIST, CERTIFIED REGISTERED

## 2023-01-01 PROCEDURE — 83605 ASSAY OF LACTIC ACID: CPT | Performed by: SURGERY

## 2023-01-01 PROCEDURE — 84484 ASSAY OF TROPONIN QUANT: CPT

## 2023-01-01 PROCEDURE — 99215 OFFICE O/P EST HI 40 MIN: CPT | Performed by: NURSE PRACTITIONER

## 2023-01-01 PROCEDURE — 82040 ASSAY OF SERUM ALBUMIN: CPT

## 2023-01-01 PROCEDURE — 99215 OFFICE O/P EST HI 40 MIN: CPT | Performed by: INTERNAL MEDICINE

## 2023-01-01 PROCEDURE — C9113 INJ PANTOPRAZOLE SODIUM, VIA: HCPCS | Performed by: STUDENT IN AN ORGANIZED HEALTH CARE EDUCATION/TRAINING PROGRAM

## 2023-01-01 PROCEDURE — 01996 DLY HOSP MGMT EDRL RX ADMIN: CPT | Mod: GC | Performed by: ANESTHESIOLOGY

## 2023-01-01 PROCEDURE — 97110 THERAPEUTIC EXERCISES: CPT | Mod: GO

## 2023-01-01 PROCEDURE — 84295 ASSAY OF SERUM SODIUM: CPT | Performed by: HOSPITALIST

## 2023-01-01 PROCEDURE — 97116 GAIT TRAINING THERAPY: CPT | Mod: GP | Performed by: PHYSICAL THERAPIST

## 2023-01-01 PROCEDURE — 85027 COMPLETE CBC AUTOMATED: CPT | Performed by: PATHOLOGY

## 2023-01-01 PROCEDURE — 84484 ASSAY OF TROPONIN QUANT: CPT | Performed by: STUDENT IN AN ORGANIZED HEALTH CARE EDUCATION/TRAINING PROGRAM

## 2023-01-01 PROCEDURE — 99215 OFFICE O/P EST HI 40 MIN: CPT | Performed by: PHYSICIAN ASSISTANT

## 2023-01-01 PROCEDURE — 99214 OFFICE O/P EST MOD 30 MIN: CPT | Performed by: PHYSICIAN ASSISTANT

## 2023-01-01 PROCEDURE — 999N000184 HC STATISTIC TELEMETRY

## 2023-01-01 PROCEDURE — 93321 DOPPLER ECHO F-UP/LMTD STD: CPT | Mod: 26 | Performed by: INTERNAL MEDICINE

## 2023-01-01 PROCEDURE — 97530 THERAPEUTIC ACTIVITIES: CPT | Mod: GO

## 2023-01-01 PROCEDURE — 82803 BLOOD GASES ANY COMBINATION: CPT | Performed by: SURGERY

## 2023-01-01 PROCEDURE — 93279 PRGRMG DEV EVAL PM/LDLS PM: CPT | Mod: 26 | Performed by: INTERNAL MEDICINE

## 2023-01-01 PROCEDURE — 250N000011 HC RX IP 250 OP 636: Performed by: PHYSICIAN ASSISTANT

## 2023-01-01 PROCEDURE — 99222 1ST HOSP IP/OBS MODERATE 55: CPT | Performed by: PHYSICIAN ASSISTANT

## 2023-01-01 PROCEDURE — 99232 SBSQ HOSP IP/OBS MODERATE 35: CPT | Performed by: INTERNAL MEDICINE

## 2023-01-01 PROCEDURE — 93010 ELECTROCARDIOGRAM REPORT: CPT | Performed by: INTERNAL MEDICINE

## 2023-01-01 PROCEDURE — G0463 HOSPITAL OUTPT CLINIC VISIT: HCPCS | Performed by: NURSE PRACTITIONER

## 2023-01-01 PROCEDURE — 999N000253 HC STATISTIC WEANING TRIALS

## 2023-01-01 PROCEDURE — 250N000011 HC RX IP 250 OP 636: Mod: JZ | Performed by: HOSPITALIST

## 2023-01-01 PROCEDURE — C1769 GUIDE WIRE: HCPCS | Performed by: INTERNAL MEDICINE

## 2023-01-01 PROCEDURE — 93288 INTERROG EVL PM/LDLS PM IP: CPT | Performed by: INTERNAL MEDICINE

## 2023-01-01 PROCEDURE — 82248 BILIRUBIN DIRECT: CPT

## 2023-01-01 PROCEDURE — 74018 RADEX ABDOMEN 1 VIEW: CPT | Mod: 26 | Performed by: RADIOLOGY

## 2023-01-01 PROCEDURE — 250N000011 HC RX IP 250 OP 636: Mod: JZ | Performed by: INTERNAL MEDICINE

## 2023-01-01 PROCEDURE — 87493 C DIFF AMPLIFIED PROBE: CPT | Performed by: INTERNAL MEDICINE

## 2023-01-01 PROCEDURE — 214N000001 HC R&B CCU UMMC

## 2023-01-01 PROCEDURE — 272N000452 HC KIT SHRLOCK 5FR POWER PICC TRIPLE LUMEN

## 2023-01-01 PROCEDURE — G0463 HOSPITAL OUTPT CLINIC VISIT: HCPCS | Performed by: PHYSICIAN ASSISTANT

## 2023-01-01 PROCEDURE — 84132 ASSAY OF SERUM POTASSIUM: CPT

## 2023-01-01 PROCEDURE — 84100 ASSAY OF PHOSPHORUS: CPT | Performed by: INTERNAL MEDICINE

## 2023-01-01 PROCEDURE — 85610 PROTHROMBIN TIME: CPT | Performed by: STUDENT IN AN ORGANIZED HEALTH CARE EDUCATION/TRAINING PROGRAM

## 2023-01-01 PROCEDURE — 99223 1ST HOSP IP/OBS HIGH 75: CPT | Performed by: FAMILY MEDICINE

## 2023-01-01 PROCEDURE — 93306 TTE W/DOPPLER COMPLETE: CPT | Mod: 26 | Performed by: INTERNAL MEDICINE

## 2023-01-01 PROCEDURE — 97530 THERAPEUTIC ACTIVITIES: CPT | Mod: GP | Performed by: PHYSICAL THERAPIST

## 2023-01-01 PROCEDURE — 82553 CREATINE MB FRACTION: CPT

## 2023-01-01 PROCEDURE — 250N000011 HC RX IP 250 OP 636: Performed by: NURSE ANESTHETIST, CERTIFIED REGISTERED

## 2023-01-01 PROCEDURE — 87633 RESP VIRUS 12-25 TARGETS: CPT | Performed by: PHYSICIAN ASSISTANT

## 2023-01-01 PROCEDURE — 83880 ASSAY OF NATRIURETIC PEPTIDE: CPT | Performed by: PHYSICIAN ASSISTANT

## 2023-01-01 PROCEDURE — 999N000065 XR ABDOMEN PORT 1 VIEW

## 2023-01-01 PROCEDURE — 272N000001 HC OR GENERAL SUPPLY STERILE: Performed by: OTOLARYNGOLOGY

## 2023-01-01 PROCEDURE — 999N000248 HC STATISTIC IV INSERT WITH US BY RN

## 2023-01-01 PROCEDURE — 97530 THERAPEUTIC ACTIVITIES: CPT | Mod: GP | Performed by: REHABILITATION PRACTITIONER

## 2023-01-01 PROCEDURE — 85396 CLOTTING ASSAY WHOLE BLOOD: CPT

## 2023-01-01 PROCEDURE — 36569 INSJ PICC 5 YR+ W/O IMAGING: CPT

## 2023-01-01 PROCEDURE — 82550 ASSAY OF CK (CPK): CPT

## 2023-01-01 PROCEDURE — 81001 URINALYSIS AUTO W/SCOPE: CPT | Performed by: STUDENT IN AN ORGANIZED HEALTH CARE EDUCATION/TRAINING PROGRAM

## 2023-01-01 PROCEDURE — 93286 PERI-PX EVAL PM/LDLS PM IP: CPT | Mod: 26 | Performed by: INTERNAL MEDICINE

## 2023-01-01 PROCEDURE — 258N000003 HC RX IP 258 OP 636: Performed by: NURSE PRACTITIONER

## 2023-01-01 PROCEDURE — 85027 COMPLETE CBC AUTOMATED: CPT | Performed by: INTERNAL MEDICINE

## 2023-01-01 PROCEDURE — 87324 CLOSTRIDIUM AG IA: CPT | Performed by: INTERNAL MEDICINE

## 2023-01-01 PROCEDURE — 99207 PR NO CHARGE LOS: CPT | Performed by: INTERNAL MEDICINE

## 2023-01-01 PROCEDURE — 80069 RENAL FUNCTION PANEL: CPT | Performed by: NURSE PRACTITIONER

## 2023-01-01 PROCEDURE — 02PA3MZ REMOVAL OF CARDIAC LEAD FROM HEART, PERCUTANEOUS APPROACH: ICD-10-PCS | Performed by: INTERNAL MEDICINE

## 2023-01-01 PROCEDURE — 02RJ08Z REPLACEMENT OF TRICUSPID VALVE WITH ZOOPLASTIC TISSUE, OPEN APPROACH: ICD-10-PCS | Performed by: SURGERY

## 2023-01-01 PROCEDURE — 85610 PROTHROMBIN TIME: CPT | Performed by: NURSE PRACTITIONER

## 2023-01-01 PROCEDURE — 87493 C DIFF AMPLIFIED PROBE: CPT | Performed by: PHYSICIAN ASSISTANT

## 2023-01-01 PROCEDURE — 81001 URINALYSIS AUTO W/SCOPE: CPT

## 2023-01-01 PROCEDURE — 87070 CULTURE OTHR SPECIMN AEROBIC: CPT | Performed by: STUDENT IN AN ORGANIZED HEALTH CARE EDUCATION/TRAINING PROGRAM

## 2023-01-01 PROCEDURE — 93306 TTE W/DOPPLER COMPLETE: CPT

## 2023-01-01 PROCEDURE — 85610 PROTHROMBIN TIME: CPT | Performed by: PHYSICIAN ASSISTANT

## 2023-01-01 PROCEDURE — 81001 URINALYSIS AUTO W/SCOPE: CPT | Performed by: INTERNAL MEDICINE

## 2023-01-01 PROCEDURE — 80053 COMPREHEN METABOLIC PANEL: CPT | Performed by: PHYSICIAN ASSISTANT

## 2023-01-01 PROCEDURE — 410N000003 HC PER-PERFUSION 1ST 30 MIN: Performed by: INTERNAL MEDICINE

## 2023-01-01 PROCEDURE — 999N000104 HEPATITIS C RNA, QUANTITATIVE BY PCR: Performed by: INTERNAL MEDICINE

## 2023-01-01 PROCEDURE — 85018 HEMOGLOBIN: CPT | Performed by: PHYSICIAN ASSISTANT

## 2023-01-01 PROCEDURE — 97530 THERAPEUTIC ACTIVITIES: CPT | Mod: GO | Performed by: OCCUPATIONAL THERAPIST

## 2023-01-01 PROCEDURE — 5A1221Z PERFORMANCE OF CARDIAC OUTPUT, CONTINUOUS: ICD-10-PCS | Performed by: SURGERY

## 2023-01-01 PROCEDURE — 82805 BLOOD GASES W/O2 SATURATION: CPT | Performed by: STUDENT IN AN ORGANIZED HEALTH CARE EDUCATION/TRAINING PROGRAM

## 2023-01-01 PROCEDURE — 31624 DX BRONCHOSCOPE/LAVAGE: CPT | Performed by: ANESTHESIOLOGY

## 2023-01-01 PROCEDURE — 90662 IIV NO PRSV INCREASED AG IM: CPT

## 2023-01-01 PROCEDURE — 99441 PR PHYSICIAN TELEPHONE EVALUATION 5-10 MIN: CPT | Mod: CS

## 2023-01-01 PROCEDURE — 80053 COMPREHEN METABOLIC PANEL: CPT | Performed by: INTERNAL MEDICINE

## 2023-01-01 PROCEDURE — 70450 CT HEAD/BRAIN W/O DYE: CPT

## 2023-01-01 PROCEDURE — 3E043XZ INTRODUCTION OF VASOPRESSOR INTO CENTRAL VEIN, PERCUTANEOUS APPROACH: ICD-10-PCS | Performed by: SURGERY

## 2023-01-01 PROCEDURE — 99233 SBSQ HOSP IP/OBS HIGH 50: CPT | Performed by: PHYSICIAN ASSISTANT

## 2023-01-01 PROCEDURE — 250N000011 HC RX IP 250 OP 636: Mod: JZ | Performed by: PHYSICIAN ASSISTANT

## 2023-01-01 PROCEDURE — 87205 SMEAR GRAM STAIN: CPT

## 2023-01-01 PROCEDURE — 258N000003 HC RX IP 258 OP 636: Performed by: NURSE ANESTHETIST, CERTIFIED REGISTERED

## 2023-01-01 PROCEDURE — 97161 PT EVAL LOW COMPLEX 20 MIN: CPT | Mod: GP

## 2023-01-01 PROCEDURE — 99239 HOSP IP/OBS DSCHRG MGMT >30: CPT | Mod: FS

## 2023-01-01 PROCEDURE — 710N000010 HC RECOVERY PHASE 1, LEVEL 2, PER MIN: Performed by: INTERNAL MEDICINE

## 2023-01-01 PROCEDURE — 82330 ASSAY OF CALCIUM: CPT | Performed by: STUDENT IN AN ORGANIZED HEALTH CARE EDUCATION/TRAINING PROGRAM

## 2023-01-01 PROCEDURE — 84295 ASSAY OF SERUM SODIUM: CPT | Performed by: NURSE PRACTITIONER

## 2023-01-01 PROCEDURE — 82374 ASSAY BLOOD CARBON DIOXIDE: CPT | Performed by: NURSE PRACTITIONER

## 2023-01-01 PROCEDURE — C1730 CATH, EP, 19 OR FEW ELECT: HCPCS | Performed by: INTERNAL MEDICINE

## 2023-01-01 PROCEDURE — 999N000141 HC STATISTIC PRE-PROCEDURE NURSING ASSESSMENT: Performed by: INTERNAL MEDICINE

## 2023-01-01 PROCEDURE — 87449 NOS EACH ORGANISM AG IA: CPT | Performed by: NURSE PRACTITIONER

## 2023-01-01 PROCEDURE — 250N000025 HC SEVOFLURANE, PER MIN: Performed by: SURGERY

## 2023-01-01 PROCEDURE — 258N000003 HC RX IP 258 OP 636: Performed by: INTERNAL MEDICINE

## 2023-01-01 PROCEDURE — 82803 BLOOD GASES ANY COMBINATION: CPT | Performed by: STUDENT IN AN ORGANIZED HEALTH CARE EDUCATION/TRAINING PROGRAM

## 2023-01-01 PROCEDURE — 85027 COMPLETE CBC AUTOMATED: CPT | Performed by: STUDENT IN AN ORGANIZED HEALTH CARE EDUCATION/TRAINING PROGRAM

## 2023-01-01 PROCEDURE — 80069 RENAL FUNCTION PANEL: CPT

## 2023-01-01 PROCEDURE — G0008 ADMIN INFLUENZA VIRUS VAC: HCPCS

## 2023-01-01 PROCEDURE — 999N000034 HC STATISTIC CODE BLUE ACCESS REQUIRED

## 2023-01-01 PROCEDURE — 99152 MOD SED SAME PHYS/QHP 5/>YRS: CPT | Performed by: INTERNAL MEDICINE

## 2023-01-01 PROCEDURE — 0B9L8ZX DRAINAGE OF LEFT LUNG, VIA NATURAL OR ARTIFICIAL OPENING ENDOSCOPIC, DIAGNOSTIC: ICD-10-PCS | Performed by: ANESTHESIOLOGY

## 2023-01-01 PROCEDURE — 87637 SARSCOV2&INF A&B&RSV AMP PRB: CPT | Performed by: STUDENT IN AN ORGANIZED HEALTH CARE EDUCATION/TRAINING PROGRAM

## 2023-01-01 PROCEDURE — 99232 SBSQ HOSP IP/OBS MODERATE 35: CPT | Performed by: HOSPITALIST

## 2023-01-01 PROCEDURE — 999N000258 HC STATISTIC TRACH CHANGE

## 2023-01-01 PROCEDURE — 999N000141 HC STATISTIC PRE-PROCEDURE NURSING ASSESSMENT: Performed by: SURGERY

## 2023-01-01 PROCEDURE — 92507 TX SP LANG VOICE COMM INDIV: CPT | Mod: GN

## 2023-01-01 PROCEDURE — 84075 ASSAY ALKALINE PHOSPHATASE: CPT | Performed by: SURGERY

## 2023-01-01 PROCEDURE — 85384 FIBRINOGEN ACTIVITY: CPT | Performed by: SURGERY

## 2023-01-01 PROCEDURE — 85730 THROMBOPLASTIN TIME PARTIAL: CPT | Performed by: SURGERY

## 2023-01-01 PROCEDURE — 85730 THROMBOPLASTIN TIME PARTIAL: CPT

## 2023-01-01 PROCEDURE — 85041 AUTOMATED RBC COUNT: CPT

## 2023-01-01 PROCEDURE — 33641 REPAIR HEART SEPTUM DEFECT: CPT | Mod: 51 | Performed by: SURGERY

## 2023-01-01 PROCEDURE — 71250 CT THORAX DX C-: CPT | Mod: 26 | Performed by: RADIOLOGY

## 2023-01-01 PROCEDURE — 93286 PERI-PX EVAL PM/LDLS PM IP: CPT

## 2023-01-01 PROCEDURE — G0463 HOSPITAL OUTPT CLINIC VISIT: HCPCS | Performed by: INTERNAL MEDICINE

## 2023-01-01 PROCEDURE — 5A09357 ASSISTANCE WITH RESPIRATORY VENTILATION, LESS THAN 24 CONSECUTIVE HOURS, CONTINUOUS POSITIVE AIRWAY PRESSURE: ICD-10-PCS | Performed by: ANESTHESIOLOGY

## 2023-01-01 PROCEDURE — 99153 MOD SED SAME PHYS/QHP EA: CPT | Performed by: INTERNAL MEDICINE

## 2023-01-01 PROCEDURE — 82552 ASSAY OF CPK IN BLOOD: CPT | Mod: 90

## 2023-01-01 PROCEDURE — 83880 ASSAY OF NATRIURETIC PEPTIDE: CPT | Performed by: NURSE PRACTITIONER

## 2023-01-01 PROCEDURE — 92610 EVALUATE SWALLOWING FUNCTION: CPT | Mod: GN

## 2023-01-01 PROCEDURE — 360N000086 HC SURGERY LEVEL 6 W/ FLUORO, PER MIN: Performed by: INTERNAL MEDICINE

## 2023-01-01 PROCEDURE — 87106 FUNGI IDENTIFICATION YEAST: CPT | Performed by: STUDENT IN AN ORGANIZED HEALTH CARE EDUCATION/TRAINING PROGRAM

## 2023-01-01 PROCEDURE — 93294 REM INTERROG EVL PM/LDLS PM: CPT | Performed by: INTERNAL MEDICINE

## 2023-01-01 PROCEDURE — 87640 STAPH A DNA AMP PROBE: CPT

## 2023-01-01 PROCEDURE — 370N000017 HC ANESTHESIA TECHNICAL FEE, PER MIN: Performed by: OTOLARYNGOLOGY

## 2023-01-01 PROCEDURE — 81001 URINALYSIS AUTO W/SCOPE: CPT | Performed by: PHYSICIAN ASSISTANT

## 2023-01-01 PROCEDURE — 85014 HEMATOCRIT: CPT | Performed by: SURGERY

## 2023-01-01 PROCEDURE — 74018 RADEX ABDOMEN 1 VIEW: CPT

## 2023-01-01 PROCEDURE — 0B21XFZ CHANGE TRACHEOSTOMY DEVICE IN TRACHEA, EXTERNAL APPROACH: ICD-10-PCS | Performed by: INTERNAL MEDICINE

## 2023-01-01 PROCEDURE — 85730 THROMBOPLASTIN TIME PARTIAL: CPT | Performed by: STUDENT IN AN ORGANIZED HEALTH CARE EDUCATION/TRAINING PROGRAM

## 2023-01-01 PROCEDURE — 97165 OT EVAL LOW COMPLEX 30 MIN: CPT | Mod: GO

## 2023-01-01 PROCEDURE — 999N000185 HC STATISTIC TRANSPORT TIME EA 15 MIN

## 2023-01-01 PROCEDURE — 999N000071 HC STATISTIC HEART CATH LAB OR EP LAB

## 2023-01-01 PROCEDURE — 255N000002 HC RX 255 OP 636: Performed by: INTERNAL MEDICINE

## 2023-01-01 PROCEDURE — 99024 POSTOP FOLLOW-UP VISIT: CPT | Mod: 25

## 2023-01-01 PROCEDURE — 80048 BASIC METABOLIC PNL TOTAL CA: CPT | Performed by: NURSE PRACTITIONER

## 2023-01-01 PROCEDURE — 93325 DOPPLER ECHO COLOR FLOW MAPG: CPT | Mod: 26 | Performed by: INTERNAL MEDICINE

## 2023-01-01 PROCEDURE — 93288 INTERROG EVL PM/LDLS PM IP: CPT

## 2023-01-01 PROCEDURE — 84132 ASSAY OF SERUM POTASSIUM: CPT | Performed by: INTERNAL MEDICINE

## 2023-01-01 PROCEDURE — 83880 ASSAY OF NATRIURETIC PEPTIDE: CPT

## 2023-01-01 PROCEDURE — C1753 CATH, INTRAVAS ULTRASOUND: HCPCS | Performed by: INTERNAL MEDICINE

## 2023-01-01 PROCEDURE — 80069 RENAL FUNCTION PANEL: CPT | Performed by: STUDENT IN AN ORGANIZED HEALTH CARE EDUCATION/TRAINING PROGRAM

## 2023-01-01 PROCEDURE — 99214 OFFICE O/P EST MOD 30 MIN: CPT | Performed by: INTERNAL MEDICINE

## 2023-01-01 PROCEDURE — 87522 HEPATITIS C REVRS TRNSCRPJ: CPT | Performed by: INTERNAL MEDICINE

## 2023-01-01 PROCEDURE — 85025 COMPLETE CBC W/AUTO DIFF WBC: CPT | Performed by: PHYSICIAN ASSISTANT

## 2023-01-01 PROCEDURE — 93308 TTE F-UP OR LMTD: CPT

## 2023-01-01 PROCEDURE — 44500 INTRO GASTROINTESTINAL TUBE: CPT

## 2023-01-01 PROCEDURE — 83036 HEMOGLOBIN GLYCOSYLATED A1C: CPT | Performed by: SURGERY

## 2023-01-01 PROCEDURE — 86923 COMPATIBILITY TEST ELECTRIC: CPT | Performed by: STUDENT IN AN ORGANIZED HEALTH CARE EDUCATION/TRAINING PROGRAM

## 2023-01-01 PROCEDURE — 81001 URINALYSIS AUTO W/SCOPE: CPT | Performed by: PATHOLOGY

## 2023-01-01 PROCEDURE — 83550 IRON BINDING TEST: CPT | Performed by: FAMILY MEDICINE

## 2023-01-01 PROCEDURE — 250N000025 HC SEVOFLURANE, PER MIN: Performed by: OTOLARYNGOLOGY

## 2023-01-01 PROCEDURE — 99291 CRITICAL CARE FIRST HOUR: CPT | Mod: 24 | Performed by: STUDENT IN AN ORGANIZED HEALTH CARE EDUCATION/TRAINING PROGRAM

## 2023-01-01 PROCEDURE — 250N000009 HC RX 250: Performed by: PHYSICIAN ASSISTANT

## 2023-01-01 PROCEDURE — 999N000054 HC STATISTIC EKG NON-CHARGEABLE

## 2023-01-01 PROCEDURE — 92597 ORAL SPEECH DEVICE EVAL: CPT | Mod: GN

## 2023-01-01 PROCEDURE — 71045 X-RAY EXAM CHEST 1 VIEW: CPT | Mod: 76

## 2023-01-01 PROCEDURE — 82247 BILIRUBIN TOTAL: CPT

## 2023-01-01 PROCEDURE — 272N000019 HC KIT OPEN ENDED DOUBLE LUMEN

## 2023-01-01 PROCEDURE — 5A1955Z RESPIRATORY VENTILATION, GREATER THAN 96 CONSECUTIVE HOURS: ICD-10-PCS | Performed by: PHYSICIAN ASSISTANT

## 2023-01-01 PROCEDURE — 82374 ASSAY BLOOD CARBON DIOXIDE: CPT

## 2023-01-01 PROCEDURE — 85610 PROTHROMBIN TIME: CPT | Performed by: SURGERY

## 2023-01-01 PROCEDURE — 87081 CULTURE SCREEN ONLY: CPT | Performed by: INTERNAL MEDICINE

## 2023-01-01 PROCEDURE — 5A1955Z RESPIRATORY VENTILATION, GREATER THAN 96 CONSECUTIVE HOURS: ICD-10-PCS | Performed by: NURSE PRACTITIONER

## 2023-01-01 PROCEDURE — C1889 IMPLANT/INSERT DEVICE, NOC: HCPCS | Performed by: SURGERY

## 2023-01-01 PROCEDURE — 93320 DOPPLER ECHO COMPLETE: CPT | Mod: 26 | Performed by: INTERNAL MEDICINE

## 2023-01-01 PROCEDURE — 87040 BLOOD CULTURE FOR BACTERIA: CPT | Performed by: SURGERY

## 2023-01-01 PROCEDURE — 0B110F4 BYPASS TRACHEA TO CUTANEOUS WITH TRACHEOSTOMY DEVICE, OPEN APPROACH: ICD-10-PCS | Performed by: OTOLARYNGOLOGY

## 2023-01-01 PROCEDURE — 87070 CULTURE OTHR SPECIMN AEROBIC: CPT | Performed by: NURSE PRACTITIONER

## 2023-01-01 PROCEDURE — 370N000003 HC ANESTHESIA WARD SERVICE: Performed by: ANESTHESIOLOGY

## 2023-01-01 PROCEDURE — 97166 OT EVAL MOD COMPLEX 45 MIN: CPT | Mod: GO

## 2023-01-01 PROCEDURE — 99205 OFFICE O/P NEW HI 60 MIN: CPT | Mod: 25 | Performed by: INTERNAL MEDICINE

## 2023-01-01 PROCEDURE — 99291 CRITICAL CARE FIRST HOUR: CPT

## 2023-01-01 PROCEDURE — 99213 OFFICE O/P EST LOW 20 MIN: CPT | Performed by: INTERNAL MEDICINE

## 2023-01-01 PROCEDURE — 84550 ASSAY OF BLOOD/URIC ACID: CPT

## 2023-01-01 PROCEDURE — C1898 LEAD, PMKR, OTHER THAN TRANS: HCPCS | Performed by: SURGERY

## 2023-01-01 PROCEDURE — 370N000017 HC ANESTHESIA TECHNICAL FEE, PER MIN: Performed by: INTERNAL MEDICINE

## 2023-01-01 PROCEDURE — 87635 SARS-COV-2 COVID-19 AMP PRB: CPT | Performed by: PHYSICIAN ASSISTANT

## 2023-01-01 PROCEDURE — 82947 ASSAY GLUCOSE BLOOD QUANT: CPT | Performed by: STUDENT IN AN ORGANIZED HEALTH CARE EDUCATION/TRAINING PROGRAM

## 2023-01-01 PROCEDURE — 74174 CTA ABD&PLVS W/CONTRAST: CPT

## 2023-01-01 PROCEDURE — 33465 REPLACE TRICUSPID VALVE: CPT | Performed by: SURGERY

## 2023-01-01 PROCEDURE — 99223 1ST HOSP IP/OBS HIGH 75: CPT | Performed by: INTERNAL MEDICINE

## 2023-01-01 PROCEDURE — 272N000735 HC KIT, CIRCUIT WITH NEB, VOLERA

## 2023-01-01 PROCEDURE — 82810 BLOOD GASES O2 SAT ONLY: CPT

## 2023-01-01 PROCEDURE — 710N000012 HC RECOVERY PHASE 2, PER MINUTE: Performed by: INTERNAL MEDICINE

## 2023-01-01 PROCEDURE — 84100 ASSAY OF PHOSPHORUS: CPT | Performed by: HOSPITALIST

## 2023-01-01 PROCEDURE — 82962 GLUCOSE BLOOD TEST: CPT

## 2023-01-01 PROCEDURE — 999N000128 HC STATISTIC PERIPHERAL IV START W/O US GUIDANCE

## 2023-01-01 PROCEDURE — 83880 ASSAY OF NATRIURETIC PEPTIDE: CPT | Performed by: FAMILY MEDICINE

## 2023-01-01 PROCEDURE — 99233 SBSQ HOSP IP/OBS HIGH 50: CPT | Performed by: FAMILY MEDICINE

## 2023-01-01 PROCEDURE — 84145 PROCALCITONIN (PCT): CPT | Performed by: PHYSICIAN ASSISTANT

## 2023-01-01 PROCEDURE — C9113 INJ PANTOPRAZOLE SODIUM, VIA: HCPCS | Mod: JZ | Performed by: SURGERY

## 2023-01-01 PROCEDURE — 83735 ASSAY OF MAGNESIUM: CPT | Performed by: PHYSICIAN ASSISTANT

## 2023-01-01 PROCEDURE — 97110 THERAPEUTIC EXERCISES: CPT | Mod: GP | Performed by: PHYSICAL THERAPIST

## 2023-01-01 PROCEDURE — 87086 URINE CULTURE/COLONY COUNT: CPT

## 2023-01-01 PROCEDURE — 84075 ASSAY ALKALINE PHOSPHATASE: CPT

## 2023-01-01 PROCEDURE — 250N000009 HC RX 250: Performed by: OTOLARYNGOLOGY

## 2023-01-01 PROCEDURE — G0438 PPPS, INITIAL VISIT: HCPCS | Performed by: INTERNAL MEDICINE

## 2023-01-01 PROCEDURE — 93503 INSERT/PLACE HEART CATHETER: CPT | Performed by: INTERNAL MEDICINE

## 2023-01-01 PROCEDURE — 0BJ08ZZ INSPECTION OF TRACHEOBRONCHIAL TREE, VIA NATURAL OR ARTIFICIAL OPENING ENDOSCOPIC: ICD-10-PCS | Performed by: OTOLARYNGOLOGY

## 2023-01-01 PROCEDURE — 94668 MNPJ CHEST WALL SBSQ: CPT

## 2023-01-01 PROCEDURE — 999N000285 HC STATISTIC VASC ACCESS LAB DRAW WITH PIV START

## 2023-01-01 PROCEDURE — C1786 PMKR, SINGLE, RATE-RESP: HCPCS | Performed by: INTERNAL MEDICINE

## 2023-01-01 PROCEDURE — 85025 COMPLETE CBC W/AUTO DIFF WBC: CPT | Performed by: INTERNAL MEDICINE

## 2023-01-01 PROCEDURE — A9585 GADOBUTROL INJECTION: HCPCS | Performed by: INTERNAL MEDICINE

## 2023-01-01 PROCEDURE — 250N000009 HC RX 250: Performed by: ANESTHESIOLOGY

## 2023-01-01 PROCEDURE — 71046 X-RAY EXAM CHEST 2 VIEWS: CPT

## 2023-01-01 PROCEDURE — 80053 COMPREHEN METABOLIC PANEL: CPT | Performed by: NURSE PRACTITIONER

## 2023-01-01 PROCEDURE — 83605 ASSAY OF LACTIC ACID: CPT | Performed by: PHYSICIAN ASSISTANT

## 2023-01-01 PROCEDURE — 74176 CT ABD & PELVIS W/O CONTRAST: CPT | Mod: 26 | Performed by: RADIOLOGY

## 2023-01-01 PROCEDURE — G0463 HOSPITAL OUTPT CLINIC VISIT: HCPCS

## 2023-01-01 PROCEDURE — 93279 PRGRMG DEV EVAL PM/LDLS PM: CPT

## 2023-01-01 PROCEDURE — 80053 COMPREHEN METABOLIC PANEL: CPT | Performed by: STUDENT IN AN ORGANIZED HEALTH CARE EDUCATION/TRAINING PROGRAM

## 2023-01-01 PROCEDURE — 272N000278 HC DEVICE 5FR SECURACATH

## 2023-01-01 PROCEDURE — 93288 INTERROG EVL PM/LDLS PM IP: CPT | Mod: 26 | Performed by: INTERNAL MEDICINE

## 2023-01-01 PROCEDURE — C1887 CATHETER, GUIDING: HCPCS | Performed by: INTERNAL MEDICINE

## 2023-01-01 PROCEDURE — 86850 RBC ANTIBODY SCREEN: CPT

## 2023-01-01 PROCEDURE — 81003 URINALYSIS AUTO W/O SCOPE: CPT | Performed by: INTERNAL MEDICINE

## 2023-01-01 PROCEDURE — 82803 BLOOD GASES ANY COMBINATION: CPT | Performed by: PHYSICIAN ASSISTANT

## 2023-01-01 PROCEDURE — 87086 URINE CULTURE/COLONY COUNT: CPT | Performed by: INTERNAL MEDICINE

## 2023-01-01 PROCEDURE — P9016 RBC LEUKOCYTES REDUCED: HCPCS | Performed by: STUDENT IN AN ORGANIZED HEALTH CARE EDUCATION/TRAINING PROGRAM

## 2023-01-01 PROCEDURE — 94667 MNPJ CHEST WALL 1ST: CPT

## 2023-01-01 PROCEDURE — 87070 CULTURE OTHR SPECIMN AEROBIC: CPT

## 2023-01-01 PROCEDURE — 85027 COMPLETE CBC AUTOMATED: CPT | Performed by: SURGERY

## 2023-01-01 PROCEDURE — 93000 ELECTROCARDIOGRAM COMPLETE: CPT | Performed by: INTERNAL MEDICINE

## 2023-01-01 PROCEDURE — 87106 FUNGI IDENTIFICATION YEAST: CPT

## 2023-01-01 PROCEDURE — 271N000002 HC RX 271: Performed by: STUDENT IN AN ORGANIZED HEALTH CARE EDUCATION/TRAINING PROGRAM

## 2023-01-01 PROCEDURE — 85027 COMPLETE CBC AUTOMATED: CPT | Performed by: PHYSICIAN ASSISTANT

## 2023-01-01 PROCEDURE — 99239 HOSP IP/OBS DSCHRG MGMT >30: CPT | Performed by: INTERNAL MEDICINE

## 2023-01-01 PROCEDURE — 36415 COLL VENOUS BLD VENIPUNCTURE: CPT | Performed by: PATHOLOGY

## 2023-01-01 PROCEDURE — 99207 PR INPT ADMISSION FROM CLINIC: CPT | Performed by: INTERNAL MEDICINE

## 2023-01-01 PROCEDURE — 92610 EVALUATE SWALLOWING FUNCTION: CPT | Mod: GN | Performed by: SPEECH-LANGUAGE PATHOLOGIST

## 2023-01-01 PROCEDURE — 99204 OFFICE O/P NEW MOD 45 MIN: CPT | Performed by: SURGERY

## 2023-01-01 PROCEDURE — 99000 SPECIMEN HANDLING OFFICE-LAB: CPT

## 2023-01-01 PROCEDURE — 93325 DOPPLER ECHO COLOR FLOW MAPG: CPT | Mod: XU

## 2023-01-01 PROCEDURE — 82805 BLOOD GASES W/O2 SATURATION: CPT | Performed by: HOSPITALIST

## 2023-01-01 PROCEDURE — 31624 DX BRONCHOSCOPE/LAVAGE: CPT

## 2023-01-01 PROCEDURE — P9037 PLATE PHERES LEUKOREDU IRRAD: HCPCS | Performed by: ANESTHESIOLOGY

## 2023-01-01 PROCEDURE — 250N000012 HC RX MED GY IP 250 OP 636 PS 637: Performed by: STUDENT IN AN ORGANIZED HEALTH CARE EDUCATION/TRAINING PROGRAM

## 2023-01-01 PROCEDURE — 272N000001 HC OR GENERAL SUPPLY STERILE: Performed by: SURGERY

## 2023-01-01 PROCEDURE — 85730 THROMBOPLASTIN TIME PARTIAL: CPT | Performed by: INTERNAL MEDICINE

## 2023-01-01 PROCEDURE — 02Q50ZZ REPAIR ATRIAL SEPTUM, OPEN APPROACH: ICD-10-PCS | Performed by: SURGERY

## 2023-01-01 PROCEDURE — 99152 MOD SED SAME PHYS/QHP 5/>YRS: CPT | Mod: GC | Performed by: INTERNAL MEDICINE

## 2023-01-01 PROCEDURE — 84132 ASSAY OF SERUM POTASSIUM: CPT | Performed by: STUDENT IN AN ORGANIZED HEALTH CARE EDUCATION/TRAINING PROGRAM

## 2023-01-01 PROCEDURE — 999N000127 HC STATISTIC PERIPHERAL IV START W US GUIDANCE

## 2023-01-01 PROCEDURE — 93456 R HRT CORONARY ARTERY ANGIO: CPT | Mod: 26 | Performed by: INTERNAL MEDICINE

## 2023-01-01 PROCEDURE — 86850 RBC ANTIBODY SCREEN: CPT | Performed by: SURGERY

## 2023-01-01 DEVICE — IMPLANTABLE DEVICE: Type: IMPLANTABLE DEVICE | Site: HEART | Status: FUNCTIONAL

## 2023-01-01 RX ORDER — VANCOMYCIN HYDROCHLORIDE 125 MG/1
125 CAPSULE ORAL 4 TIMES DAILY
Status: DISCONTINUED | OUTPATIENT
Start: 2023-01-01 | End: 2023-01-01

## 2023-01-01 RX ORDER — SACCHAROMYCES BOULARDII 250 MG
250 CAPSULE ORAL 2 TIMES DAILY
Status: DISCONTINUED | OUTPATIENT
Start: 2023-01-01 | End: 2023-01-01

## 2023-01-01 RX ORDER — HEPARIN SODIUM 5000 [USP'U]/.5ML
5000 INJECTION, SOLUTION INTRAVENOUS; SUBCUTANEOUS EVERY 8 HOURS SCHEDULED
Status: CANCELLED | OUTPATIENT
Start: 2023-01-01

## 2023-01-01 RX ORDER — CARBOXYMETHYLCELLULOSE SODIUM 5 MG/ML
1 SOLUTION/ DROPS OPHTHALMIC
Status: DISCONTINUED | OUTPATIENT
Start: 2023-01-01 | End: 2023-01-01 | Stop reason: HOSPADM

## 2023-01-01 RX ORDER — BUMETANIDE 0.25 MG/ML
1 INJECTION INTRAMUSCULAR; INTRAVENOUS EVERY 12 HOURS
Status: DISCONTINUED | OUTPATIENT
Start: 2023-01-01 | End: 2023-01-01

## 2023-01-01 RX ORDER — GUAIFENESIN 600 MG/1
15 TABLET, EXTENDED RELEASE ORAL DAILY
Status: CANCELLED | OUTPATIENT
Start: 2023-01-01

## 2023-01-01 RX ORDER — WARFARIN SODIUM 2 MG/1
TABLET ORAL
Qty: 170 TABLET | Refills: 1 | Status: ON HOLD | OUTPATIENT
Start: 2023-01-01 | End: 2023-01-01

## 2023-01-01 RX ORDER — AMOXICILLIN 250 MG
1 CAPSULE ORAL 2 TIMES DAILY PRN
Status: DISCONTINUED | OUTPATIENT
Start: 2023-01-01 | End: 2023-01-01

## 2023-01-01 RX ORDER — BUMETANIDE 1 MG/1
1 TABLET ORAL DAILY
Status: DISCONTINUED | OUTPATIENT
Start: 2023-01-01 | End: 2023-01-01

## 2023-01-01 RX ORDER — ASPIRIN 81 MG/1
162 TABLET, CHEWABLE ORAL
Status: CANCELLED | OUTPATIENT
Start: 2023-01-01

## 2023-01-01 RX ORDER — AMOXICILLIN 250 MG
1 CAPSULE ORAL 2 TIMES DAILY PRN
Status: DISCONTINUED | OUTPATIENT
Start: 2023-01-01 | End: 2024-01-01

## 2023-01-01 RX ORDER — POTASSIUM CHLORIDE 1500 MG/1
20 TABLET, EXTENDED RELEASE ORAL
Status: CANCELLED | OUTPATIENT
Start: 2023-01-01

## 2023-01-01 RX ORDER — CHLOROTHIAZIDE SODIUM 500 MG/1
500 INJECTION INTRAVENOUS ONCE
Status: COMPLETED | OUTPATIENT
Start: 2023-01-01 | End: 2023-01-01

## 2023-01-01 RX ORDER — ACETYLCYSTEINE 200 MG/ML
2 SOLUTION ORAL; RESPIRATORY (INHALATION) 4 TIMES DAILY
Status: DISCONTINUED | OUTPATIENT
Start: 2023-01-01 | End: 2023-01-01

## 2023-01-01 RX ORDER — HEPARIN SODIUM 1000 [USP'U]/ML
INJECTION, SOLUTION INTRAVENOUS; SUBCUTANEOUS
Status: DISCONTINUED | OUTPATIENT
Start: 2023-01-01 | End: 2023-01-01 | Stop reason: HOSPADM

## 2023-01-01 RX ORDER — ACETAMINOPHEN 325 MG/1
650 TABLET ORAL EVERY 4 HOURS PRN
Status: DISCONTINUED | OUTPATIENT
Start: 2023-01-01 | End: 2023-01-01 | Stop reason: HOSPADM

## 2023-01-01 RX ORDER — CHLORHEXIDINE GLUCONATE ORAL RINSE 1.2 MG/ML
10 SOLUTION DENTAL ONCE
Status: CANCELLED | OUTPATIENT
Start: 2023-01-01 | End: 2023-01-01

## 2023-01-01 RX ORDER — ACETYLCYSTEINE 200 MG/ML
2 SOLUTION ORAL; RESPIRATORY (INHALATION) EVERY 6 HOURS PRN
Status: DISCONTINUED | OUTPATIENT
Start: 2023-01-01 | End: 2024-01-01

## 2023-01-01 RX ORDER — WARFARIN SODIUM 4 MG/1
4 TABLET ORAL
Status: COMPLETED | OUTPATIENT
Start: 2023-01-01 | End: 2023-01-01

## 2023-01-01 RX ORDER — LIDOCAINE HYDROCHLORIDE 10 MG/ML
INJECTION, SOLUTION EPIDURAL; INFILTRATION; INTRACAUDAL; PERINEURAL
Status: COMPLETED
Start: 2023-01-01 | End: 2023-01-01

## 2023-01-01 RX ORDER — LOPERAMIDE HCL 1 MG/7.5ML
2 SOLUTION ORAL 3 TIMES DAILY PRN
Status: DISCONTINUED | OUTPATIENT
Start: 2023-01-01 | End: 2024-01-01

## 2023-01-01 RX ORDER — SPIRONOLACTONE 25 MG/1
25 TABLET ORAL DAILY
Status: DISCONTINUED | OUTPATIENT
Start: 2023-01-01 | End: 2023-01-01

## 2023-01-01 RX ORDER — SODIUM BICARBONATE TAB 650 MG 650 MG
325 TAB ORAL ONCE
Status: DISCONTINUED | OUTPATIENT
Start: 2023-01-01 | End: 2023-01-01

## 2023-01-01 RX ORDER — LIDOCAINE HYDROCHLORIDE AND EPINEPHRINE 10; 10 MG/ML; UG/ML
INJECTION, SOLUTION INFILTRATION; PERINEURAL PRN
Status: DISCONTINUED | OUTPATIENT
Start: 2023-01-01 | End: 2023-01-01 | Stop reason: HOSPADM

## 2023-01-01 RX ORDER — BISACODYL 10 MG
10 SUPPOSITORY, RECTAL RECTAL DAILY PRN
Status: CANCELLED | OUTPATIENT
Start: 2023-01-01

## 2023-01-01 RX ORDER — ESCITALOPRAM OXALATE 20 MG/1
20 TABLET ORAL DAILY
Qty: 90 TABLET | Refills: 3 | Status: SHIPPED | OUTPATIENT
Start: 2023-01-01 | End: 2023-01-01

## 2023-01-01 RX ORDER — LIDOCAINE 40 MG/G
CREAM TOPICAL
Status: CANCELLED | OUTPATIENT
Start: 2023-01-01

## 2023-01-01 RX ORDER — CLINDAMYCIN PHOSPHATE 900 MG/50ML
900 INJECTION, SOLUTION INTRAVENOUS SEE ADMIN INSTRUCTIONS
Status: DISCONTINUED | OUTPATIENT
Start: 2023-01-01 | End: 2023-01-01 | Stop reason: HOSPADM

## 2023-01-01 RX ORDER — POTASSIUM CHLORIDE 20MEQ/15ML
20 LIQUID (ML) ORAL 2 TIMES DAILY
Status: DISCONTINUED | OUTPATIENT
Start: 2023-01-01 | End: 2024-01-01

## 2023-01-01 RX ORDER — SODIUM BICARBONATE TAB 650 MG 650 MG
325 TAB ORAL ONCE
Status: COMPLETED | OUTPATIENT
Start: 2023-01-01 | End: 2023-01-01

## 2023-01-01 RX ORDER — DEXTROSE MONOHYDRATE 100 MG/ML
INJECTION, SOLUTION INTRAVENOUS CONTINUOUS PRN
Status: DISCONTINUED | OUTPATIENT
Start: 2023-01-01 | End: 2024-01-01

## 2023-01-01 RX ORDER — NOREPINEPHRINE BITARTRATE 0.06 MG/ML
.01-.1 INJECTION, SOLUTION INTRAVENOUS CONTINUOUS
Status: DISCONTINUED | OUTPATIENT
Start: 2023-01-01 | End: 2023-01-01 | Stop reason: HOSPADM

## 2023-01-01 RX ORDER — POTASSIUM CHLORIDE 20MEQ/15ML
40 LIQUID (ML) ORAL 2 TIMES DAILY
Status: COMPLETED | OUTPATIENT
Start: 2023-01-01 | End: 2023-01-01

## 2023-01-01 RX ORDER — NALOXONE HYDROCHLORIDE 0.4 MG/ML
0.4 INJECTION, SOLUTION INTRAMUSCULAR; INTRAVENOUS; SUBCUTANEOUS
Status: DISCONTINUED | OUTPATIENT
Start: 2023-01-01 | End: 2024-01-15 | Stop reason: HOSPADM

## 2023-01-01 RX ORDER — HYDROMORPHONE HCL IN WATER/PF 6 MG/30 ML
0.2 PATIENT CONTROLLED ANALGESIA SYRINGE INTRAVENOUS
Status: DISCONTINUED | OUTPATIENT
Start: 2023-01-01 | End: 2023-01-01 | Stop reason: HOSPADM

## 2023-01-01 RX ORDER — PIPERACILLIN SODIUM, TAZOBACTAM SODIUM 2; .25 G/10ML; G/10ML
2.25 INJECTION, POWDER, LYOPHILIZED, FOR SOLUTION INTRAVENOUS EVERY 8 HOURS
Status: DISCONTINUED | OUTPATIENT
Start: 2023-01-01 | End: 2023-01-01

## 2023-01-01 RX ORDER — HEPARIN SODIUM 5000 [USP'U]/.5ML
5000 INJECTION, SOLUTION INTRAVENOUS; SUBCUTANEOUS EVERY 8 HOURS
Status: DISCONTINUED | OUTPATIENT
Start: 2023-01-01 | End: 2023-01-01

## 2023-01-01 RX ORDER — SIMETHICONE 40MG/0.6ML
40 SUSPENSION, DROPS(FINAL DOSAGE FORM)(ML) ORAL EVERY 6 HOURS PRN
Status: DISCONTINUED | OUTPATIENT
Start: 2023-01-01 | End: 2024-01-01

## 2023-01-01 RX ORDER — FENTANYL CITRATE 50 UG/ML
25 INJECTION, SOLUTION INTRAMUSCULAR; INTRAVENOUS
Status: DISCONTINUED | OUTPATIENT
Start: 2023-01-01 | End: 2023-01-01

## 2023-01-01 RX ORDER — SODIUM CHLORIDE, SODIUM LACTATE, POTASSIUM CHLORIDE, CALCIUM CHLORIDE 600; 310; 30; 20 MG/100ML; MG/100ML; MG/100ML; MG/100ML
INJECTION, SOLUTION INTRAVENOUS CONTINUOUS PRN
Status: DISCONTINUED | OUTPATIENT
Start: 2023-01-01 | End: 2023-01-01

## 2023-01-01 RX ORDER — TORSEMIDE 20 MG/1
20 TABLET ORAL ONCE
Status: COMPLETED | OUTPATIENT
Start: 2023-01-01 | End: 2023-01-01

## 2023-01-01 RX ORDER — BISACODYL 10 MG
10 SUPPOSITORY, RECTAL RECTAL DAILY PRN
Status: DISCONTINUED | OUTPATIENT
Start: 2023-01-01 | End: 2023-01-01 | Stop reason: HOSPADM

## 2023-01-01 RX ORDER — CLINDAMYCIN PHOSPHATE 900 MG/50ML
900 INJECTION, SOLUTION INTRAVENOUS
Status: DISCONTINUED | OUTPATIENT
Start: 2023-01-01 | End: 2023-01-01

## 2023-01-01 RX ORDER — LOPERAMIDE HCL 1 MG/7.5ML
2 SOLUTION ORAL 2 TIMES DAILY
Status: DISCONTINUED | OUTPATIENT
Start: 2023-01-01 | End: 2024-01-01

## 2023-01-01 RX ORDER — PREDNISONE 20 MG/1
40 TABLET ORAL DAILY
Qty: 2 TABLET | Refills: 0 | Status: SHIPPED | OUTPATIENT
Start: 2023-01-01 | End: 2023-01-01

## 2023-01-01 RX ORDER — BUMETANIDE 0.25 MG/ML
2 INJECTION INTRAMUSCULAR; INTRAVENOUS ONCE
Status: COMPLETED | OUTPATIENT
Start: 2023-01-01 | End: 2023-01-01

## 2023-01-01 RX ORDER — ONDANSETRON 2 MG/ML
4 INJECTION INTRAMUSCULAR; INTRAVENOUS EVERY 6 HOURS PRN
Status: DISCONTINUED | OUTPATIENT
Start: 2023-01-01 | End: 2023-01-01 | Stop reason: HOSPADM

## 2023-01-01 RX ORDER — PANTOPRAZOLE SODIUM 40 MG/1
40 TABLET, DELAYED RELEASE ORAL DAILY
Status: DISCONTINUED | OUTPATIENT
Start: 2023-01-01 | End: 2023-01-01

## 2023-01-01 RX ORDER — ALBUTEROL SULFATE 0.83 MG/ML
2.5 SOLUTION RESPIRATORY (INHALATION)
Status: DISCONTINUED | OUTPATIENT
Start: 2023-01-01 | End: 2023-01-01 | Stop reason: HOSPADM

## 2023-01-01 RX ORDER — ESCITALOPRAM OXALATE 10 MG/1
10 TABLET ORAL DAILY
Status: DISCONTINUED | OUTPATIENT
Start: 2023-01-01 | End: 2023-01-01 | Stop reason: HOSPADM

## 2023-01-01 RX ORDER — CARBOXYMETHYLCELLULOSE SODIUM 5 MG/ML
1 SOLUTION/ DROPS OPHTHALMIC
Status: CANCELLED | OUTPATIENT
Start: 2023-01-01

## 2023-01-01 RX ORDER — LIDOCAINE 40 MG/G
CREAM TOPICAL
Status: DISCONTINUED | OUTPATIENT
Start: 2023-01-01 | End: 2023-01-01 | Stop reason: HOSPADM

## 2023-01-01 RX ORDER — PHENYLEPHRINE HCL IN 0.9% NACL 50MG/250ML
.1-6 PLASTIC BAG, INJECTION (ML) INTRAVENOUS CONTINUOUS
Status: DISCONTINUED | OUTPATIENT
Start: 2023-01-01 | End: 2023-01-01 | Stop reason: HOSPADM

## 2023-01-01 RX ORDER — BUMETANIDE 0.25 MG/ML
2 INJECTION INTRAMUSCULAR; INTRAVENOUS EVERY 12 HOURS
Status: DISCONTINUED | OUTPATIENT
Start: 2023-01-01 | End: 2024-01-01

## 2023-01-01 RX ORDER — FAMOTIDINE 20 MG/1
20 TABLET, FILM COATED ORAL DAILY
Status: DISCONTINUED | OUTPATIENT
Start: 2023-01-01 | End: 2024-01-01

## 2023-01-01 RX ORDER — ACETAMINOPHEN 325 MG/1
975 TABLET ORAL ONCE
Status: CANCELLED | OUTPATIENT
Start: 2023-01-01 | End: 2023-01-01

## 2023-01-01 RX ORDER — PROCHLORPERAZINE MALEATE 5 MG
5 TABLET ORAL EVERY 6 HOURS PRN
Status: ACTIVE | OUTPATIENT
Start: 2023-01-01 | End: 2023-01-01

## 2023-01-01 RX ORDER — FUROSEMIDE 10 MG/ML
60 INJECTION INTRAMUSCULAR; INTRAVENOUS
Status: DISCONTINUED | OUTPATIENT
Start: 2023-01-01 | End: 2023-01-01

## 2023-01-01 RX ORDER — SACCHAROMYCES BOULARDII 250 MG
250 CAPSULE ORAL 2 TIMES DAILY
Status: CANCELLED | OUTPATIENT
Start: 2023-01-01

## 2023-01-01 RX ORDER — ACETYLCYSTEINE 200 MG/ML
2 SOLUTION ORAL; RESPIRATORY (INHALATION) 2 TIMES DAILY
Status: DISCONTINUED | OUTPATIENT
Start: 2023-01-01 | End: 2023-01-01

## 2023-01-01 RX ORDER — SODIUM CHLORIDE, SODIUM LACTATE, POTASSIUM CHLORIDE, CALCIUM CHLORIDE 600; 310; 30; 20 MG/100ML; MG/100ML; MG/100ML; MG/100ML
INJECTION, SOLUTION INTRAVENOUS CONTINUOUS
Status: DISCONTINUED | OUTPATIENT
Start: 2023-01-01 | End: 2023-01-01 | Stop reason: HOSPADM

## 2023-01-01 RX ORDER — NALOXONE HYDROCHLORIDE 0.4 MG/ML
0.2 INJECTION, SOLUTION INTRAMUSCULAR; INTRAVENOUS; SUBCUTANEOUS
Status: DISCONTINUED | OUTPATIENT
Start: 2023-01-01 | End: 2023-01-01 | Stop reason: HOSPADM

## 2023-01-01 RX ORDER — WARFARIN SODIUM 3 MG/1
3 TABLET ORAL
Status: COMPLETED | OUTPATIENT
Start: 2023-01-01 | End: 2023-01-01

## 2023-01-01 RX ORDER — ACETAMINOPHEN 650 MG/20.3ML
650 LIQUID ORAL EVERY 4 HOURS PRN
Status: DISCONTINUED | OUTPATIENT
Start: 2023-01-01 | End: 2024-01-01

## 2023-01-01 RX ORDER — NALOXONE HYDROCHLORIDE 0.4 MG/ML
0.4 INJECTION, SOLUTION INTRAMUSCULAR; INTRAVENOUS; SUBCUTANEOUS
Status: DISCONTINUED | OUTPATIENT
Start: 2023-01-01 | End: 2023-01-01 | Stop reason: HOSPADM

## 2023-01-01 RX ORDER — MIDODRINE HYDROCHLORIDE 5 MG/1
5 TABLET ORAL 3 TIMES DAILY PRN
Status: DISCONTINUED | OUTPATIENT
Start: 2023-01-01 | End: 2023-01-01

## 2023-01-01 RX ORDER — IPRATROPIUM BROMIDE AND ALBUTEROL SULFATE 2.5; .5 MG/3ML; MG/3ML
3 SOLUTION RESPIRATORY (INHALATION) ONCE
Status: COMPLETED | OUTPATIENT
Start: 2023-01-01 | End: 2023-01-01

## 2023-01-01 RX ORDER — ACETAMINOPHEN 325 MG/1
975 TABLET ORAL ONCE
Status: COMPLETED | OUTPATIENT
Start: 2023-01-01 | End: 2023-01-01

## 2023-01-01 RX ORDER — LOPERAMIDE HCL 2 MG
2 CAPSULE ORAL 3 TIMES DAILY PRN
Status: DISCONTINUED | OUTPATIENT
Start: 2023-01-01 | End: 2023-01-01

## 2023-01-01 RX ORDER — DEXTROSE MONOHYDRATE 25 G/50ML
25-50 INJECTION, SOLUTION INTRAVENOUS
Status: DISCONTINUED | OUTPATIENT
Start: 2023-01-01 | End: 2023-01-01

## 2023-01-01 RX ORDER — PROCHLORPERAZINE 25 MG
12.5 SUPPOSITORY, RECTAL RECTAL EVERY 12 HOURS PRN
Status: DISCONTINUED | OUTPATIENT
Start: 2023-01-01 | End: 2023-01-01 | Stop reason: HOSPADM

## 2023-01-01 RX ORDER — ACETAMINOPHEN 650 MG/1
650 SUPPOSITORY RECTAL EVERY 4 HOURS PRN
Status: CANCELLED | OUTPATIENT
Start: 2023-01-01

## 2023-01-01 RX ORDER — ASPIRIN 325 MG
325 TABLET ORAL ONCE
Status: CANCELLED | OUTPATIENT
Start: 2023-01-01 | End: 2023-01-01

## 2023-01-01 RX ORDER — HYDROXYZINE HYDROCHLORIDE 25 MG/1
25-50 TABLET, FILM COATED ORAL
Status: COMPLETED | OUTPATIENT
Start: 2023-01-01 | End: 2023-01-01

## 2023-01-01 RX ORDER — HYDRALAZINE HYDROCHLORIDE 20 MG/ML
10 INJECTION INTRAMUSCULAR; INTRAVENOUS EVERY 30 MIN PRN
Status: DISCONTINUED | OUTPATIENT
Start: 2023-01-01 | End: 2023-01-01

## 2023-01-01 RX ORDER — PROPOFOL 10 MG/ML
INJECTION, EMULSION INTRAVENOUS CONTINUOUS PRN
Status: DISCONTINUED | OUTPATIENT
Start: 2023-01-01 | End: 2023-01-01

## 2023-01-01 RX ORDER — CLINDAMYCIN PHOSPHATE 900 MG/50ML
900 INJECTION, SOLUTION INTRAVENOUS
Status: CANCELLED | OUTPATIENT
Start: 2023-01-01

## 2023-01-01 RX ORDER — GUAIFENESIN 600 MG/1
15 TABLET, EXTENDED RELEASE ORAL DAILY
Status: DISCONTINUED | OUTPATIENT
Start: 2023-01-01 | End: 2024-01-01

## 2023-01-01 RX ORDER — PIPERACILLIN SODIUM, TAZOBACTAM SODIUM 3; .375 G/15ML; G/15ML
3.38 INJECTION, POWDER, LYOPHILIZED, FOR SOLUTION INTRAVENOUS EVERY 6 HOURS
Status: DISCONTINUED | OUTPATIENT
Start: 2023-01-01 | End: 2023-01-01

## 2023-01-01 RX ORDER — TORSEMIDE 20 MG/1
40 TABLET ORAL DAILY
Status: DISCONTINUED | OUTPATIENT
Start: 2023-01-01 | End: 2023-01-01

## 2023-01-01 RX ORDER — ALBUTEROL SULFATE 0.83 MG/ML
2.5 SOLUTION RESPIRATORY (INHALATION) EVERY 6 HOURS PRN
Status: DISCONTINUED | OUTPATIENT
Start: 2023-01-01 | End: 2023-01-01

## 2023-01-01 RX ORDER — HYDROMORPHONE HCL IN WATER/PF 6 MG/30 ML
0.4 PATIENT CONTROLLED ANALGESIA SYRINGE INTRAVENOUS EVERY 5 MIN PRN
Status: DISCONTINUED | OUTPATIENT
Start: 2023-01-01 | End: 2023-01-01 | Stop reason: HOSPADM

## 2023-01-01 RX ORDER — ONDANSETRON 2 MG/ML
4 INJECTION INTRAMUSCULAR; INTRAVENOUS EVERY 30 MIN PRN
Status: CANCELLED | OUTPATIENT
Start: 2023-01-01

## 2023-01-01 RX ORDER — FUROSEMIDE 10 MG/ML
60 INJECTION INTRAMUSCULAR; INTRAVENOUS ONCE
Status: COMPLETED | OUTPATIENT
Start: 2023-01-01 | End: 2023-01-01

## 2023-01-01 RX ORDER — FENTANYL CITRATE 50 UG/ML
INJECTION, SOLUTION INTRAMUSCULAR; INTRAVENOUS PRN
Status: DISCONTINUED | OUTPATIENT
Start: 2023-01-01 | End: 2023-01-01

## 2023-01-01 RX ORDER — VANCOMYCIN HYDROCHLORIDE 125 MG/1
125 CAPSULE ORAL 4 TIMES DAILY
Qty: 28 CAPSULE | Refills: 0 | Status: SHIPPED | OUTPATIENT
Start: 2023-01-01 | End: 2023-01-01

## 2023-01-01 RX ORDER — ALBUTEROL SULFATE 0.83 MG/ML
2.5 SOLUTION RESPIRATORY (INHALATION)
Status: DISCONTINUED | OUTPATIENT
Start: 2023-01-01 | End: 2024-01-01

## 2023-01-01 RX ORDER — WARFARIN SODIUM 1 MG/1
1 TABLET ORAL ONCE
Status: COMPLETED | OUTPATIENT
Start: 2023-01-01 | End: 2023-01-01

## 2023-01-01 RX ORDER — ESCITALOPRAM OXALATE 10 MG/1
10 TABLET ORAL DAILY
Qty: 90 TABLET | Refills: 3 | Status: SHIPPED | OUTPATIENT
Start: 2023-01-01

## 2023-01-01 RX ORDER — CALCIUM GLUCONATE 20 MG/ML
2 INJECTION, SOLUTION INTRAVENOUS
Status: ACTIVE | OUTPATIENT
Start: 2023-01-01 | End: 2023-01-01

## 2023-01-01 RX ORDER — GUAIFENESIN AND DEXTROMETHORPHAN HYDROBROMIDE 600; 30 MG/1; MG/1
1 TABLET, EXTENDED RELEASE ORAL 2 TIMES DAILY
Status: DISCONTINUED | OUTPATIENT
Start: 2023-01-01 | End: 2023-01-01

## 2023-01-01 RX ORDER — POLYETHYLENE GLYCOL 3350 17 G/17G
17 POWDER, FOR SOLUTION ORAL DAILY PRN
Status: DISCONTINUED | OUTPATIENT
Start: 2023-01-01 | End: 2024-01-01

## 2023-01-01 RX ORDER — FAMOTIDINE 20 MG/1
20 TABLET, FILM COATED ORAL DAILY
Status: DISCONTINUED | OUTPATIENT
Start: 2023-01-01 | End: 2023-01-01 | Stop reason: HOSPADM

## 2023-01-01 RX ORDER — HYDROCHLOROTHIAZIDE 25 MG/1
25 TABLET ORAL ONCE
Status: COMPLETED | OUTPATIENT
Start: 2023-01-01 | End: 2023-01-01

## 2023-01-01 RX ORDER — CEFAZOLIN SODIUM 1 G/3ML
1 INJECTION, POWDER, FOR SOLUTION INTRAMUSCULAR; INTRAVENOUS EVERY 12 HOURS
Qty: 10 ML | Refills: 0 | Status: COMPLETED | OUTPATIENT
Start: 2023-01-01 | End: 2023-01-01

## 2023-01-01 RX ORDER — CEFAZOLIN SODIUM 2 G/100ML
2 INJECTION, SOLUTION INTRAVENOUS SEE ADMIN INSTRUCTIONS
Status: CANCELLED | OUTPATIENT
Start: 2023-01-01

## 2023-01-01 RX ORDER — ACETAMINOPHEN 650 MG/1
650 SUPPOSITORY RECTAL EVERY 4 HOURS
Status: DISCONTINUED | OUTPATIENT
Start: 2023-01-01 | End: 2023-01-01

## 2023-01-01 RX ORDER — METOPROLOL TARTRATE 25 MG/1
25 TABLET, FILM COATED ORAL 2 TIMES DAILY
Status: DISCONTINUED | OUTPATIENT
Start: 2023-01-01 | End: 2023-01-01

## 2023-01-01 RX ORDER — ESCITALOPRAM OXALATE 10 MG/1
10 TABLET ORAL DAILY
Status: DISCONTINUED | OUTPATIENT
Start: 2023-01-01 | End: 2023-01-01

## 2023-01-01 RX ORDER — HYDROMORPHONE HCL IN WATER/PF 6 MG/30 ML
0.4 PATIENT CONTROLLED ANALGESIA SYRINGE INTRAVENOUS
Status: DISCONTINUED | OUTPATIENT
Start: 2023-01-01 | End: 2023-01-01

## 2023-01-01 RX ORDER — SPIRONOLACTONE 25 MG/1
25 TABLET ORAL DAILY
Qty: 90 TABLET | Refills: 3 | Status: ON HOLD | OUTPATIENT
Start: 2023-01-01 | End: 2023-01-01

## 2023-01-01 RX ORDER — HYDROXYZINE HYDROCHLORIDE 25 MG/1
25 TABLET, FILM COATED ORAL 2 TIMES DAILY PRN
Status: DISCONTINUED | OUTPATIENT
Start: 2023-01-01 | End: 2023-01-01

## 2023-01-01 RX ORDER — NOREPINEPHRINE BITARTRATE 0.06 MG/ML
.01-.6 INJECTION, SOLUTION INTRAVENOUS CONTINUOUS
Status: DISCONTINUED | OUTPATIENT
Start: 2023-01-01 | End: 2023-01-01

## 2023-01-01 RX ORDER — ONDANSETRON 4 MG/1
4 TABLET, ORALLY DISINTEGRATING ORAL EVERY 30 MIN PRN
Status: DISCONTINUED | OUTPATIENT
Start: 2023-01-01 | End: 2023-01-01 | Stop reason: HOSPADM

## 2023-01-01 RX ORDER — LABETALOL HYDROCHLORIDE 5 MG/ML
15 INJECTION, SOLUTION INTRAVENOUS ONCE
Status: COMPLETED | OUTPATIENT
Start: 2023-01-01 | End: 2023-01-01

## 2023-01-01 RX ORDER — DIPHENHYDRAMINE HCL 25 MG
25 CAPSULE ORAL EVERY 6 HOURS PRN
Status: CANCELLED | OUTPATIENT
Start: 2023-01-01

## 2023-01-01 RX ORDER — FAMOTIDINE 20 MG/1
20 TABLET, FILM COATED ORAL DAILY
Status: DISCONTINUED | OUTPATIENT
Start: 2023-01-01 | End: 2023-01-01

## 2023-01-01 RX ORDER — ACETYLCYSTEINE 200 MG/ML
2 SOLUTION ORAL; RESPIRATORY (INHALATION) EVERY 4 HOURS
Status: DISCONTINUED | OUTPATIENT
Start: 2023-01-01 | End: 2023-01-01

## 2023-01-01 RX ORDER — HYDROMORPHONE HCL IN WATER/PF 6 MG/30 ML
0.2 PATIENT CONTROLLED ANALGESIA SYRINGE INTRAVENOUS
Status: DISCONTINUED | OUTPATIENT
Start: 2023-01-01 | End: 2023-01-01

## 2023-01-01 RX ORDER — SODIUM CHLORIDE 9 MG/ML
INJECTION, SOLUTION INTRAVENOUS CONTINUOUS
Status: DISCONTINUED | OUTPATIENT
Start: 2023-01-01 | End: 2023-01-01 | Stop reason: HOSPADM

## 2023-01-01 RX ORDER — TORSEMIDE 10 MG/1
10 TABLET ORAL ONCE
Status: COMPLETED | OUTPATIENT
Start: 2023-01-01 | End: 2023-01-01

## 2023-01-01 RX ORDER — ALBUTEROL SULFATE 90 UG/1
2 AEROSOL, METERED RESPIRATORY (INHALATION) EVERY 4 HOURS
Status: DISCONTINUED | OUTPATIENT
Start: 2023-01-01 | End: 2023-01-01

## 2023-01-01 RX ORDER — HYDROXYZINE HYDROCHLORIDE 25 MG/1
25 TABLET, FILM COATED ORAL 2 TIMES DAILY PRN
Status: DISCONTINUED | OUTPATIENT
Start: 2023-01-01 | End: 2024-01-01

## 2023-01-01 RX ORDER — PROPOFOL 10 MG/ML
INJECTION, EMULSION INTRAVENOUS PRN
Status: DISCONTINUED | OUTPATIENT
Start: 2023-01-01 | End: 2023-01-01

## 2023-01-01 RX ORDER — ONDANSETRON 4 MG/1
4 TABLET, ORALLY DISINTEGRATING ORAL EVERY 6 HOURS PRN
Status: DISCONTINUED | OUTPATIENT
Start: 2023-01-01 | End: 2023-01-01 | Stop reason: HOSPADM

## 2023-01-01 RX ORDER — POTASSIUM CHLORIDE 1500 MG/1
20 TABLET, EXTENDED RELEASE ORAL
Status: DISCONTINUED | OUTPATIENT
Start: 2023-01-01 | End: 2023-01-01 | Stop reason: HOSPADM

## 2023-01-01 RX ORDER — BARIUM SULFATE 400 MG/ML
SUSPENSION ORAL ONCE
Status: COMPLETED | OUTPATIENT
Start: 2023-01-01 | End: 2023-01-01

## 2023-01-01 RX ORDER — FENTANYL CITRATE 50 UG/ML
INJECTION, SOLUTION INTRAMUSCULAR; INTRAVENOUS
Status: DISCONTINUED | OUTPATIENT
Start: 2023-01-01 | End: 2023-01-01 | Stop reason: HOSPADM

## 2023-01-01 RX ORDER — FUROSEMIDE 10 MG/ML
40 INJECTION INTRAMUSCULAR; INTRAVENOUS ONCE
Status: COMPLETED | OUTPATIENT
Start: 2023-01-01 | End: 2023-01-01

## 2023-01-01 RX ORDER — MEROPENEM 1 G/1
1000 INJECTION, POWDER, FOR SOLUTION INTRAVENOUS EVERY 12 HOURS
Status: COMPLETED | OUTPATIENT
Start: 2023-01-01 | End: 2023-01-01

## 2023-01-01 RX ORDER — ONDANSETRON 2 MG/ML
INJECTION INTRAMUSCULAR; INTRAVENOUS PRN
Status: DISCONTINUED | OUTPATIENT
Start: 2023-01-01 | End: 2023-01-01

## 2023-01-01 RX ORDER — PROPOFOL 10 MG/ML
INJECTION, EMULSION INTRAVENOUS
Status: COMPLETED
Start: 2023-01-01 | End: 2023-01-01

## 2023-01-01 RX ORDER — CHLORHEXIDINE GLUCONATE ORAL RINSE 1.2 MG/ML
15 SOLUTION DENTAL EVERY 12 HOURS
Status: DISCONTINUED | OUTPATIENT
Start: 2023-01-01 | End: 2023-01-01 | Stop reason: HOSPADM

## 2023-01-01 RX ORDER — PROPOFOL 10 MG/ML
5-75 INJECTION, EMULSION INTRAVENOUS CONTINUOUS
Status: DISCONTINUED | OUTPATIENT
Start: 2023-01-01 | End: 2023-01-01

## 2023-01-01 RX ORDER — IOPAMIDOL 755 MG/ML
80 INJECTION, SOLUTION INTRAVASCULAR ONCE
Status: COMPLETED | OUTPATIENT
Start: 2023-01-01 | End: 2023-01-01

## 2023-01-01 RX ORDER — ACETYLCYSTEINE 200 MG/ML
2 SOLUTION ORAL; RESPIRATORY (INHALATION) EVERY 4 HOURS
Status: CANCELLED | OUTPATIENT
Start: 2023-01-01

## 2023-01-01 RX ORDER — HYDROMORPHONE HCL IN WATER/PF 6 MG/30 ML
.2-.4 PATIENT CONTROLLED ANALGESIA SYRINGE INTRAVENOUS
Status: DISCONTINUED | OUTPATIENT
Start: 2023-01-01 | End: 2023-01-01

## 2023-01-01 RX ORDER — FUROSEMIDE 10 MG/ML
20 INJECTION INTRAMUSCULAR; INTRAVENOUS ONCE
Status: DISCONTINUED | OUTPATIENT
Start: 2023-01-01 | End: 2023-01-01

## 2023-01-01 RX ORDER — VERAPAMIL HYDROCHLORIDE 2.5 MG/ML
INJECTION, SOLUTION INTRAVENOUS
Status: DISCONTINUED | OUTPATIENT
Start: 2023-01-01 | End: 2023-01-01 | Stop reason: HOSPADM

## 2023-01-01 RX ORDER — LOPERAMIDE HCL 2 MG
2 CAPSULE ORAL 4 TIMES DAILY PRN
Status: DISCONTINUED | OUTPATIENT
Start: 2023-01-01 | End: 2023-01-01

## 2023-01-01 RX ORDER — NALOXONE HYDROCHLORIDE 0.4 MG/ML
0.2 INJECTION, SOLUTION INTRAMUSCULAR; INTRAVENOUS; SUBCUTANEOUS
Status: DISCONTINUED | OUTPATIENT
Start: 2023-01-01 | End: 2024-01-15 | Stop reason: HOSPADM

## 2023-01-01 RX ORDER — VANCOMYCIN HYDROCHLORIDE 125 MG/1
125 CAPSULE ORAL DAILY
Status: DISCONTINUED | OUTPATIENT
Start: 2023-01-01 | End: 2023-01-01

## 2023-01-01 RX ORDER — OXYCODONE HYDROCHLORIDE 5 MG/1
5 TABLET ORAL EVERY 4 HOURS PRN
Status: DISCONTINUED | OUTPATIENT
Start: 2023-01-01 | End: 2023-01-01 | Stop reason: HOSPADM

## 2023-01-01 RX ORDER — POLYETHYLENE GLYCOL 3350 17 G/17G
17 POWDER, FOR SOLUTION ORAL DAILY PRN
Status: DISCONTINUED | OUTPATIENT
Start: 2023-01-01 | End: 2023-01-01 | Stop reason: HOSPADM

## 2023-01-01 RX ORDER — SODIUM CHLORIDE, SODIUM LACTATE, POTASSIUM CHLORIDE, CALCIUM CHLORIDE 600; 310; 30; 20 MG/100ML; MG/100ML; MG/100ML; MG/100ML
INJECTION, SOLUTION INTRAVENOUS CONTINUOUS
Status: CANCELLED | OUTPATIENT
Start: 2023-01-01

## 2023-01-01 RX ORDER — BUMETANIDE 0.25 MG/ML
4 INJECTION INTRAMUSCULAR; INTRAVENOUS 2 TIMES DAILY
Status: DISCONTINUED | OUTPATIENT
Start: 2023-01-01 | End: 2023-01-01

## 2023-01-01 RX ORDER — ONDANSETRON 2 MG/ML
4 INJECTION INTRAMUSCULAR; INTRAVENOUS EVERY 6 HOURS PRN
Status: DISCONTINUED | OUTPATIENT
Start: 2023-01-01 | End: 2024-01-15 | Stop reason: HOSPADM

## 2023-01-01 RX ORDER — MIDODRINE HYDROCHLORIDE 5 MG/1
5 TABLET ORAL 3 TIMES DAILY PRN
Status: DISCONTINUED | OUTPATIENT
Start: 2023-01-01 | End: 2024-01-01

## 2023-01-01 RX ORDER — OXYCODONE HYDROCHLORIDE 5 MG/1
5 TABLET ORAL EVERY 4 HOURS PRN
Status: DISCONTINUED | OUTPATIENT
Start: 2023-01-01 | End: 2023-01-01

## 2023-01-01 RX ORDER — POTASSIUM CHLORIDE 20MEQ/15ML
40 LIQUID (ML) ORAL ONCE
Status: COMPLETED | OUTPATIENT
Start: 2023-01-01 | End: 2023-01-01

## 2023-01-01 RX ORDER — ALBUTEROL SULFATE 0.83 MG/ML
2.5 SOLUTION RESPIRATORY (INHALATION)
Status: DISCONTINUED | OUTPATIENT
Start: 2023-01-01 | End: 2023-01-01

## 2023-01-01 RX ORDER — MEROPENEM 1 G/1
1 INJECTION, POWDER, FOR SOLUTION INTRAVENOUS ONCE
Status: COMPLETED | OUTPATIENT
Start: 2023-01-01 | End: 2023-01-01

## 2023-01-01 RX ORDER — LORAZEPAM 2 MG/ML
0.5 INJECTION INTRAMUSCULAR ONCE
Status: COMPLETED | OUTPATIENT
Start: 2023-01-01 | End: 2023-01-01

## 2023-01-01 RX ORDER — DEXMEDETOMIDINE HYDROCHLORIDE 4 UG/ML
.1-1.2 INJECTION, SOLUTION INTRAVENOUS CONTINUOUS
Status: DISCONTINUED | OUTPATIENT
Start: 2023-01-01 | End: 2023-01-01

## 2023-01-01 RX ORDER — NITROGLYCERIN 0.4 MG/1
0.4 TABLET SUBLINGUAL EVERY 5 MIN PRN
Status: DISCONTINUED | OUTPATIENT
Start: 2023-01-01 | End: 2023-01-01 | Stop reason: HOSPADM

## 2023-01-01 RX ORDER — OXYCODONE HYDROCHLORIDE 10 MG/1
10 TABLET ORAL
Status: CANCELLED | OUTPATIENT
Start: 2023-01-01

## 2023-01-01 RX ORDER — WARFARIN SODIUM 2 MG/1
2 TABLET ORAL
Status: COMPLETED | OUTPATIENT
Start: 2023-01-01 | End: 2023-01-01

## 2023-01-01 RX ORDER — ACETYLCYSTEINE 100 MG/ML
4 SOLUTION ORAL; RESPIRATORY (INHALATION) EVERY 4 HOURS
Status: DISCONTINUED | OUTPATIENT
Start: 2023-01-01 | End: 2023-01-01

## 2023-01-01 RX ORDER — NALOXONE HYDROCHLORIDE 0.4 MG/ML
0.2 INJECTION, SOLUTION INTRAMUSCULAR; INTRAVENOUS; SUBCUTANEOUS
Status: DISCONTINUED | OUTPATIENT
Start: 2023-01-01 | End: 2023-01-01

## 2023-01-01 RX ORDER — ACETAMINOPHEN 650 MG/1
650 SUPPOSITORY RECTAL EVERY 4 HOURS PRN
Status: DISCONTINUED | OUTPATIENT
Start: 2023-01-01 | End: 2023-01-01 | Stop reason: HOSPADM

## 2023-01-01 RX ORDER — NICOTINE POLACRILEX 4 MG
15-30 LOZENGE BUCCAL
Status: CANCELLED | OUTPATIENT
Start: 2023-01-01

## 2023-01-01 RX ORDER — SACCHAROMYCES BOULARDII 250 MG
250 CAPSULE ORAL 2 TIMES DAILY
Status: DISCONTINUED | OUTPATIENT
Start: 2023-01-01 | End: 2024-01-01

## 2023-01-01 RX ORDER — POLYETHYLENE GLYCOL 3350 17 G/17G
17 POWDER, FOR SOLUTION ORAL DAILY
Status: DISCONTINUED | OUTPATIENT
Start: 2023-01-01 | End: 2023-01-01

## 2023-01-01 RX ORDER — VANCOMYCIN HYDROCHLORIDE 1 G/200ML
1000 INJECTION, SOLUTION INTRAVENOUS EVERY 24 HOURS
Qty: 200 ML | Refills: 0 | Status: COMPLETED | OUTPATIENT
Start: 2023-01-01 | End: 2023-01-01

## 2023-01-01 RX ORDER — SULFAMETHOXAZOLE/TRIMETHOPRIM 800-160 MG
1 TABLET ORAL 2 TIMES DAILY
Qty: 10 TABLET | Refills: 0 | Status: SHIPPED | OUTPATIENT
Start: 2023-01-01 | End: 2023-01-01

## 2023-01-01 RX ORDER — PROCHLORPERAZINE MALEATE 5 MG
5 TABLET ORAL EVERY 6 HOURS PRN
Status: DISCONTINUED | OUTPATIENT
Start: 2023-01-01 | End: 2023-01-01 | Stop reason: HOSPADM

## 2023-01-01 RX ORDER — FLUMAZENIL 0.1 MG/ML
0.2 INJECTION, SOLUTION INTRAVENOUS
Status: DISCONTINUED | OUTPATIENT
Start: 2023-01-01 | End: 2023-01-01 | Stop reason: HOSPADM

## 2023-01-01 RX ORDER — LIDOCAINE 40 MG/G
CREAM TOPICAL
Status: DISCONTINUED | OUTPATIENT
Start: 2023-01-01 | End: 2023-01-01

## 2023-01-01 RX ORDER — METHYLPREDNISOLONE SODIUM SUCCINATE 125 MG/2ML
125 INJECTION, POWDER, LYOPHILIZED, FOR SOLUTION INTRAMUSCULAR; INTRAVENOUS
Status: DISCONTINUED | OUTPATIENT
Start: 2023-01-01 | End: 2023-01-01 | Stop reason: HOSPADM

## 2023-01-01 RX ORDER — NALOXONE HYDROCHLORIDE 0.4 MG/ML
0.2 INJECTION, SOLUTION INTRAMUSCULAR; INTRAVENOUS; SUBCUTANEOUS
Status: CANCELLED | OUTPATIENT
Start: 2023-01-01

## 2023-01-01 RX ORDER — HYDROXYZINE HYDROCHLORIDE 25 MG/1
25-50 TABLET, FILM COATED ORAL EVERY 6 HOURS PRN
Status: DISCONTINUED | OUTPATIENT
Start: 2023-01-01 | End: 2023-01-01

## 2023-01-01 RX ORDER — IPRATROPIUM BROMIDE AND ALBUTEROL SULFATE 2.5; .5 MG/3ML; MG/3ML
3 SOLUTION RESPIRATORY (INHALATION)
Status: DISCONTINUED | OUTPATIENT
Start: 2023-01-01 | End: 2023-01-01

## 2023-01-01 RX ORDER — POTASSIUM CHLORIDE 750 MG/1
40 TABLET, EXTENDED RELEASE ORAL 2 TIMES DAILY
Status: DISCONTINUED | OUTPATIENT
Start: 2023-01-01 | End: 2023-01-01

## 2023-01-01 RX ORDER — SPIRONOLACTONE 25 MG/1
25 TABLET ORAL DAILY
Qty: 90 TABLET | Refills: 3
Start: 2023-01-01

## 2023-01-01 RX ORDER — DEXAMETHASONE SODIUM PHOSPHATE 4 MG/ML
10 INJECTION, SOLUTION INTRA-ARTICULAR; INTRALESIONAL; INTRAMUSCULAR; INTRAVENOUS; SOFT TISSUE ONCE
Status: COMPLETED | OUTPATIENT
Start: 2023-01-01 | End: 2023-01-01

## 2023-01-01 RX ORDER — POTASSIUM CHLORIDE 29.8 MG/ML
20 INJECTION INTRAVENOUS
Status: COMPLETED | OUTPATIENT
Start: 2023-01-01 | End: 2023-01-01

## 2023-01-01 RX ORDER — VANCOMYCIN HYDROCHLORIDE 50 MG/ML
125 KIT ORAL 2 TIMES DAILY
Status: COMPLETED | OUTPATIENT
Start: 2023-01-01 | End: 2023-01-01

## 2023-01-01 RX ORDER — CLINDAMYCIN PHOSPHATE 900 MG/50ML
900 INJECTION, SOLUTION INTRAVENOUS
Status: COMPLETED | OUTPATIENT
Start: 2023-01-01 | End: 2023-01-01

## 2023-01-01 RX ORDER — ONDANSETRON 2 MG/ML
4 INJECTION INTRAMUSCULAR; INTRAVENOUS
Status: DISCONTINUED | OUTPATIENT
Start: 2023-01-01 | End: 2023-01-01 | Stop reason: HOSPADM

## 2023-01-01 RX ORDER — MIDODRINE HYDROCHLORIDE 5 MG/1
10 TABLET ORAL EVERY 8 HOURS
Status: CANCELLED | OUTPATIENT
Start: 2023-01-01

## 2023-01-01 RX ORDER — LIDOCAINE HYDROCHLORIDE 20 MG/ML
5 SOLUTION OROPHARYNGEAL ONCE
Status: COMPLETED | OUTPATIENT
Start: 2023-01-01 | End: 2023-01-01

## 2023-01-01 RX ORDER — FAMOTIDINE 20 MG/1
20 TABLET, FILM COATED ORAL
Status: COMPLETED | OUTPATIENT
Start: 2023-01-01 | End: 2023-01-01

## 2023-01-01 RX ORDER — NOREPINEPHRINE BITARTRATE 0.06 MG/ML
.01-.1 INJECTION, SOLUTION INTRAVENOUS CONTINUOUS
Status: CANCELLED | OUTPATIENT
Start: 2023-01-01

## 2023-01-01 RX ORDER — ASPIRIN 81 MG/1
81 TABLET, CHEWABLE ORAL
Status: CANCELLED | OUTPATIENT
Start: 2023-01-01

## 2023-01-01 RX ORDER — POTASSIUM CHLORIDE 750 MG/1
40 TABLET, EXTENDED RELEASE ORAL ONCE
Status: COMPLETED | OUTPATIENT
Start: 2023-01-01 | End: 2023-01-01

## 2023-01-01 RX ORDER — SPIRONOLACTONE 25 MG/1
25 TABLET ORAL DAILY
Status: DISCONTINUED | OUTPATIENT
Start: 2023-01-01 | End: 2023-01-01 | Stop reason: HOSPADM

## 2023-01-01 RX ORDER — POTASSIUM PHOS IN 0.9 % NACL 15MMOL/250
15 PLASTIC BAG, INJECTION (ML) INTRAVENOUS ONCE
Qty: 250 ML | Refills: 0 | Status: DISCONTINUED | OUTPATIENT
Start: 2023-01-01 | End: 2023-01-01

## 2023-01-01 RX ORDER — GUAIFENESIN 600 MG/1
15 TABLET, EXTENDED RELEASE ORAL DAILY
Status: DISCONTINUED | OUTPATIENT
Start: 2023-01-01 | End: 2023-01-01 | Stop reason: HOSPADM

## 2023-01-01 RX ORDER — FAMOTIDINE 20 MG/1
20 TABLET, FILM COATED ORAL
Status: CANCELLED | OUTPATIENT
Start: 2023-01-01

## 2023-01-01 RX ORDER — POLYETHYLENE GLYCOL 3350 17 G/17G
17 POWDER, FOR SOLUTION ORAL DAILY PRN
Status: CANCELLED | OUTPATIENT
Start: 2023-01-01

## 2023-01-01 RX ORDER — METOPROLOL TARTRATE 25 MG/1
25 TABLET, FILM COATED ORAL 2 TIMES DAILY
Status: DISCONTINUED | OUTPATIENT
Start: 2023-01-01 | End: 2023-01-01 | Stop reason: HOSPADM

## 2023-01-01 RX ORDER — LOPERAMIDE HCL 2 MG
2 CAPSULE ORAL DAILY
Status: DISCONTINUED | OUTPATIENT
Start: 2023-01-01 | End: 2023-01-01

## 2023-01-01 RX ORDER — NALOXONE HYDROCHLORIDE 0.4 MG/ML
0.4 INJECTION, SOLUTION INTRAMUSCULAR; INTRAVENOUS; SUBCUTANEOUS
Status: CANCELLED | OUTPATIENT
Start: 2023-01-01

## 2023-01-01 RX ORDER — MIRTAZAPINE 15 MG/1
15 TABLET, FILM COATED ORAL AT BEDTIME
Status: DISCONTINUED | OUTPATIENT
Start: 2023-01-01 | End: 2023-01-01 | Stop reason: HOSPADM

## 2023-01-01 RX ORDER — CEFEPIME HYDROCHLORIDE 1 G/1
1 INJECTION, POWDER, FOR SOLUTION INTRAMUSCULAR; INTRAVENOUS EVERY 8 HOURS
Status: DISCONTINUED | OUTPATIENT
Start: 2023-01-01 | End: 2023-01-01

## 2023-01-01 RX ORDER — POLYETHYLENE GLYCOL 3350 17 G/17G
17 POWDER, FOR SOLUTION ORAL DAILY PRN
Status: DISCONTINUED | OUTPATIENT
Start: 2023-01-01 | End: 2023-01-01

## 2023-01-01 RX ORDER — NOREPINEPHRINE BITARTRATE 0.06 MG/ML
.01-.6 INJECTION, SOLUTION INTRAVENOUS CONTINUOUS
Status: DISCONTINUED | OUTPATIENT
Start: 2023-01-01 | End: 2023-01-01 | Stop reason: HOSPADM

## 2023-01-01 RX ORDER — ASPIRIN 81 MG/1
81 TABLET, CHEWABLE ORAL
Status: DISCONTINUED | OUTPATIENT
Start: 2023-01-01 | End: 2023-01-01 | Stop reason: HOSPADM

## 2023-01-01 RX ORDER — DIPHENHYDRAMINE HCL 25 MG
25 CAPSULE ORAL EVERY 6 HOURS PRN
Status: DISCONTINUED | OUTPATIENT
Start: 2023-01-01 | End: 2023-01-01 | Stop reason: HOSPADM

## 2023-01-01 RX ORDER — ONDANSETRON 4 MG/1
4 TABLET, ORALLY DISINTEGRATING ORAL EVERY 30 MIN PRN
Status: CANCELLED | OUTPATIENT
Start: 2023-01-01

## 2023-01-01 RX ORDER — DIPHENHYDRAMINE HYDROCHLORIDE 50 MG/ML
25 INJECTION INTRAMUSCULAR; INTRAVENOUS EVERY 6 HOURS PRN
Status: DISCONTINUED | OUTPATIENT
Start: 2023-01-01 | End: 2023-01-01 | Stop reason: HOSPADM

## 2023-01-01 RX ORDER — WARFARIN SODIUM 1 MG/1
1 TABLET ORAL
Status: COMPLETED | OUTPATIENT
Start: 2023-01-01 | End: 2023-01-01

## 2023-01-01 RX ORDER — MIDODRINE HYDROCHLORIDE 5 MG/1
10 TABLET ORAL EVERY 8 HOURS
Status: DISCONTINUED | OUTPATIENT
Start: 2023-01-01 | End: 2023-01-01 | Stop reason: HOSPADM

## 2023-01-01 RX ORDER — ASPIRIN 81 MG/1
243 TABLET, CHEWABLE ORAL ONCE
Status: COMPLETED | OUTPATIENT
Start: 2023-01-01 | End: 2023-01-01

## 2023-01-01 RX ORDER — BISACODYL 5 MG
15 TABLET, DELAYED RELEASE (ENTERIC COATED) ORAL DAILY PRN
Status: DISCONTINUED | OUTPATIENT
Start: 2023-01-01 | End: 2023-01-01 | Stop reason: HOSPADM

## 2023-01-01 RX ORDER — ACETAMINOPHEN 650 MG/1
650 SUPPOSITORY RECTAL EVERY 4 HOURS PRN
Status: DISCONTINUED | OUTPATIENT
Start: 2023-01-01 | End: 2023-01-01

## 2023-01-01 RX ORDER — BISACODYL 5 MG
10 TABLET, DELAYED RELEASE (ENTERIC COATED) ORAL DAILY PRN
Status: DISCONTINUED | OUTPATIENT
Start: 2023-01-01 | End: 2023-01-01 | Stop reason: HOSPADM

## 2023-01-01 RX ORDER — CALCIUM GLUCONATE 20 MG/ML
1 INJECTION, SOLUTION INTRAVENOUS
Status: DISPENSED | OUTPATIENT
Start: 2023-01-01 | End: 2023-01-01

## 2023-01-01 RX ORDER — DEXMEDETOMIDINE HYDROCHLORIDE 4 UG/ML
.1-1.2 INJECTION, SOLUTION INTRAVENOUS CONTINUOUS
Status: DISCONTINUED | OUTPATIENT
Start: 2023-01-01 | End: 2023-01-01 | Stop reason: HOSPADM

## 2023-01-01 RX ORDER — MINERAL OIL/HYDROPHIL PETROLAT
OINTMENT (GRAM) TOPICAL DAILY PRN
Status: DISCONTINUED | OUTPATIENT
Start: 2023-01-01 | End: 2024-01-01

## 2023-01-01 RX ORDER — ASPIRIN 81 MG/1
81 TABLET, CHEWABLE ORAL DAILY
Status: DISCONTINUED | OUTPATIENT
Start: 2023-01-01 | End: 2023-01-01

## 2023-01-01 RX ORDER — OXYCODONE HYDROCHLORIDE 5 MG/1
10 TABLET ORAL EVERY 4 HOURS PRN
Status: CANCELLED | OUTPATIENT
Start: 2023-01-01

## 2023-01-01 RX ORDER — WARFARIN SODIUM 5 MG/1
5 TABLET ORAL
Status: DISCONTINUED | OUTPATIENT
Start: 2023-01-01 | End: 2023-01-01

## 2023-01-01 RX ORDER — HEPARIN SODIUM 1000 [USP'U]/ML
INJECTION, SOLUTION INTRAVENOUS; SUBCUTANEOUS PRN
Status: DISCONTINUED | OUTPATIENT
Start: 2023-01-01 | End: 2023-01-01

## 2023-01-01 RX ORDER — DEXMEDETOMIDINE HYDROCHLORIDE 4 UG/ML
.2-.7 INJECTION, SOLUTION INTRAVENOUS CONTINUOUS
Status: DISCONTINUED | OUTPATIENT
Start: 2023-01-01 | End: 2023-01-01

## 2023-01-01 RX ORDER — FENTANYL CITRATE 50 UG/ML
25-50 INJECTION, SOLUTION INTRAMUSCULAR; INTRAVENOUS
Status: DISCONTINUED | OUTPATIENT
Start: 2023-01-01 | End: 2023-01-01 | Stop reason: HOSPADM

## 2023-01-01 RX ORDER — DEXTROSE MONOHYDRATE 25 G/50ML
25-50 INJECTION, SOLUTION INTRAVENOUS
Status: DISCONTINUED | OUTPATIENT
Start: 2023-01-01 | End: 2024-01-01

## 2023-01-01 RX ORDER — FENTANYL CITRATE 50 UG/ML
50 INJECTION, SOLUTION INTRAMUSCULAR; INTRAVENOUS EVERY 5 MIN PRN
Status: DISCONTINUED | OUTPATIENT
Start: 2023-01-01 | End: 2023-01-01 | Stop reason: HOSPADM

## 2023-01-01 RX ORDER — ACETYLCYSTEINE 200 MG/ML
2 SOLUTION ORAL; RESPIRATORY (INHALATION) EVERY 4 HOURS
Status: DISCONTINUED | OUTPATIENT
Start: 2023-01-01 | End: 2023-01-01 | Stop reason: HOSPADM

## 2023-01-01 RX ORDER — SULFAMETHOXAZOLE AND TRIMETHOPRIM 400; 80 MG/1; MG/1
1 TABLET ORAL 2 TIMES DAILY
Qty: 10 TABLET | Refills: 0 | Status: DISCONTINUED | OUTPATIENT
Start: 2023-01-01 | End: 2023-01-01

## 2023-01-01 RX ORDER — MAGNESIUM HYDROXIDE/ALUMINUM HYDROXICE/SIMETHICONE 120; 1200; 1200 MG/30ML; MG/30ML; MG/30ML
30 SUSPENSION ORAL EVERY 4 HOURS PRN
Status: DISCONTINUED | OUTPATIENT
Start: 2023-01-01 | End: 2023-01-01 | Stop reason: HOSPADM

## 2023-01-01 RX ORDER — ALBUTEROL SULFATE 0.83 MG/ML
2.5 SOLUTION RESPIRATORY (INHALATION)
Status: CANCELLED | OUTPATIENT
Start: 2023-01-01

## 2023-01-01 RX ORDER — FENTANYL CITRATE 50 UG/ML
25 INJECTION, SOLUTION INTRAMUSCULAR; INTRAVENOUS
Status: DISCONTINUED | OUTPATIENT
Start: 2023-01-01 | End: 2023-01-01 | Stop reason: HOSPADM

## 2023-01-01 RX ORDER — WARFARIN SODIUM 5 MG/1
5 TABLET ORAL
Status: COMPLETED | OUTPATIENT
Start: 2023-01-01 | End: 2023-01-01

## 2023-01-01 RX ORDER — BUMETANIDE 0.25 MG/ML
2 INJECTION INTRAMUSCULAR; INTRAVENOUS 2 TIMES DAILY
Status: DISCONTINUED | OUTPATIENT
Start: 2023-01-01 | End: 2023-01-01

## 2023-01-01 RX ORDER — GADOBUTROL 604.72 MG/ML
10 INJECTION INTRAVENOUS ONCE
Status: COMPLETED | OUTPATIENT
Start: 2023-01-01 | End: 2023-01-01

## 2023-01-01 RX ORDER — ACETAMINOPHEN 325 MG/1
650 TABLET ORAL EVERY 4 HOURS
Status: DISCONTINUED | OUTPATIENT
Start: 2023-01-01 | End: 2023-01-01

## 2023-01-01 RX ORDER — ONDANSETRON 4 MG/1
4 TABLET, ORALLY DISINTEGRATING ORAL EVERY 6 HOURS PRN
Status: CANCELLED | OUTPATIENT
Start: 2023-01-01

## 2023-01-01 RX ORDER — WARFARIN SODIUM 3 MG/1
3 TABLET ORAL
Status: DISCONTINUED | OUTPATIENT
Start: 2023-01-01 | End: 2023-01-01 | Stop reason: HOSPADM

## 2023-01-01 RX ORDER — FUROSEMIDE 10 MG/ML
40 INJECTION INTRAMUSCULAR; INTRAVENOUS ONCE
Status: CANCELLED | OUTPATIENT
Start: 2023-01-01 | End: 2023-01-01

## 2023-01-01 RX ORDER — NALOXONE HYDROCHLORIDE 0.4 MG/ML
0.4 INJECTION, SOLUTION INTRAMUSCULAR; INTRAVENOUS; SUBCUTANEOUS
Status: DISCONTINUED | OUTPATIENT
Start: 2023-01-01 | End: 2023-01-01

## 2023-01-01 RX ORDER — ASPIRIN 81 MG/1
243 TABLET, CHEWABLE ORAL ONCE
Status: CANCELLED | OUTPATIENT
Start: 2023-01-01

## 2023-01-01 RX ORDER — SODIUM CHLORIDE, SODIUM GLUCONATE, SODIUM ACETATE, POTASSIUM CHLORIDE AND MAGNESIUM CHLORIDE 526; 502; 368; 37; 30 MG/100ML; MG/100ML; MG/100ML; MG/100ML; MG/100ML
INJECTION, SOLUTION INTRAVENOUS CONTINUOUS PRN
Status: DISCONTINUED | OUTPATIENT
Start: 2023-01-01 | End: 2023-01-01

## 2023-01-01 RX ORDER — CEFAZOLIN SODIUM 2 G/100ML
2 INJECTION, SOLUTION INTRAVENOUS
Status: CANCELLED | OUTPATIENT
Start: 2023-01-01

## 2023-01-01 RX ORDER — LIDOCAINE HYDROCHLORIDE 20 MG/ML
INJECTION, SOLUTION INFILTRATION; PERINEURAL PRN
Status: DISCONTINUED | OUTPATIENT
Start: 2023-01-01 | End: 2023-01-01

## 2023-01-01 RX ORDER — TORSEMIDE 20 MG/1
40 TABLET ORAL DAILY
Status: DISCONTINUED | OUTPATIENT
Start: 2023-01-01 | End: 2023-01-01 | Stop reason: HOSPADM

## 2023-01-01 RX ORDER — CARBOXYMETHYLCELLULOSE SODIUM 5 MG/ML
1 SOLUTION/ DROPS OPHTHALMIC
Status: DISCONTINUED | OUTPATIENT
Start: 2023-01-01 | End: 2024-01-15 | Stop reason: HOSPADM

## 2023-01-01 RX ORDER — CLINDAMYCIN PHOSPHATE 900 MG/50ML
900 INJECTION, SOLUTION INTRAVENOUS SEE ADMIN INSTRUCTIONS
Status: CANCELLED | OUTPATIENT
Start: 2023-01-01

## 2023-01-01 RX ORDER — ALBUTEROL SULFATE 0.83 MG/ML
2.5 SOLUTION RESPIRATORY (INHALATION) ONCE
Status: COMPLETED | OUTPATIENT
Start: 2023-01-01 | End: 2023-01-01

## 2023-01-01 RX ORDER — ALBUTEROL SULFATE 0.83 MG/ML
2.5 SOLUTION RESPIRATORY (INHALATION) 4 TIMES DAILY
Status: DISCONTINUED | OUTPATIENT
Start: 2023-01-01 | End: 2023-01-01

## 2023-01-01 RX ORDER — DEXMEDETOMIDINE HYDROCHLORIDE 4 UG/ML
.1-1.2 INJECTION, SOLUTION INTRAVENOUS CONTINUOUS
Status: CANCELLED | OUTPATIENT
Start: 2023-01-01

## 2023-01-01 RX ORDER — WARFARIN SODIUM 4 MG/1
4 TABLET ORAL
COMMUNITY
End: 2023-01-01

## 2023-01-01 RX ORDER — ACYCLOVIR 200 MG/1
0-1 CAPSULE ORAL
Status: DISCONTINUED | OUTPATIENT
Start: 2023-01-01 | End: 2023-01-01 | Stop reason: HOSPADM

## 2023-01-01 RX ORDER — ONDANSETRON 4 MG/1
4 TABLET, ORALLY DISINTEGRATING ORAL EVERY 6 HOURS PRN
Status: DISCONTINUED | OUTPATIENT
Start: 2023-01-01 | End: 2024-01-01

## 2023-01-01 RX ORDER — HYDROXYZINE HYDROCHLORIDE 25 MG/1
25 TABLET, FILM COATED ORAL 3 TIMES DAILY PRN
Status: DISCONTINUED | OUTPATIENT
Start: 2023-01-01 | End: 2023-01-01

## 2023-01-01 RX ORDER — BUMETANIDE 0.25 MG/ML
1.5 INJECTION INTRAMUSCULAR; INTRAVENOUS EVERY 12 HOURS
Status: DISCONTINUED | OUTPATIENT
Start: 2023-01-01 | End: 2023-01-01

## 2023-01-01 RX ORDER — DIPHENOXYLATE HCL/ATROPINE 2.5-.025MG
1 TABLET ORAL 4 TIMES DAILY PRN
Status: DISCONTINUED | OUTPATIENT
Start: 2023-01-01 | End: 2023-01-01

## 2023-01-01 RX ORDER — ACETAMINOPHEN 325 MG/1
650 TABLET ORAL EVERY 4 HOURS PRN
Status: DISCONTINUED | OUTPATIENT
Start: 2023-01-01 | End: 2023-01-01

## 2023-01-01 RX ORDER — DIPHENHYDRAMINE HYDROCHLORIDE 50 MG/ML
25 INJECTION INTRAMUSCULAR; INTRAVENOUS EVERY 6 HOURS PRN
Status: CANCELLED | OUTPATIENT
Start: 2023-01-01

## 2023-01-01 RX ORDER — MIRTAZAPINE 15 MG/1
15 TABLET, FILM COATED ORAL AT BEDTIME
Status: DISCONTINUED | OUTPATIENT
Start: 2023-01-01 | End: 2024-01-01

## 2023-01-01 RX ORDER — POTASSIUM CHLORIDE 1.5 G/1.58G
40 POWDER, FOR SOLUTION ORAL ONCE
Status: COMPLETED | OUTPATIENT
Start: 2023-01-01 | End: 2023-01-01

## 2023-01-01 RX ORDER — NITROFURANTOIN 25; 75 MG/1; MG/1
100 CAPSULE ORAL EVERY 12 HOURS SCHEDULED
Qty: 10 CAPSULE | Refills: 0 | Status: DISCONTINUED | OUTPATIENT
Start: 2023-01-01 | End: 2023-01-01

## 2023-01-01 RX ORDER — NICOTINE POLACRILEX 4 MG
15-30 LOZENGE BUCCAL
Status: DISCONTINUED | OUTPATIENT
Start: 2023-01-01 | End: 2023-01-01

## 2023-01-01 RX ORDER — ACETAMINOPHEN 650 MG/1
650 SUPPOSITORY RECTAL EVERY 4 HOURS PRN
Status: DISCONTINUED | OUTPATIENT
Start: 2023-01-01 | End: 2024-01-01

## 2023-01-01 RX ORDER — CLINDAMYCIN IN PERCENT DEXTROSE 6 MG/ML
300 INJECTION, SOLUTION INTRAVENOUS ONCE
Status: DISCONTINUED | OUTPATIENT
Start: 2023-01-01 | End: 2023-01-01 | Stop reason: HOSPADM

## 2023-01-01 RX ORDER — OXYCODONE HYDROCHLORIDE 5 MG/1
5 TABLET ORAL
Status: CANCELLED | OUTPATIENT
Start: 2023-01-01

## 2023-01-01 RX ORDER — POTASSIUM CHLORIDE 1.5 G/1.58G
20 POWDER, FOR SOLUTION ORAL 2 TIMES DAILY
Status: DISCONTINUED | OUTPATIENT
Start: 2023-01-01 | End: 2023-01-01

## 2023-01-01 RX ORDER — FAMOTIDINE 20 MG/1
20 TABLET, FILM COATED ORAL DAILY
Status: CANCELLED | OUTPATIENT
Start: 2023-01-01

## 2023-01-01 RX ORDER — POTASSIUM CHLORIDE 1.5 G/1.58G
20 POWDER, FOR SOLUTION ORAL ONCE
Status: COMPLETED | OUTPATIENT
Start: 2023-01-01 | End: 2023-01-01

## 2023-01-01 RX ORDER — FUROSEMIDE 10 MG/ML
20 INJECTION INTRAMUSCULAR; INTRAVENOUS ONCE
Status: COMPLETED | OUTPATIENT
Start: 2023-01-01 | End: 2023-01-01

## 2023-01-01 RX ORDER — HEPARIN SODIUM 10000 [USP'U]/100ML
0-5000 INJECTION, SOLUTION INTRAVENOUS CONTINUOUS
Status: DISCONTINUED | OUTPATIENT
Start: 2023-01-01 | End: 2023-01-01

## 2023-01-01 RX ORDER — MIRTAZAPINE 15 MG/1
15 TABLET, FILM COATED ORAL AT BEDTIME
Status: CANCELLED | OUTPATIENT
Start: 2023-01-01

## 2023-01-01 RX ORDER — HYDROMORPHONE HCL IN WATER/PF 6 MG/30 ML
0.1 PATIENT CONTROLLED ANALGESIA SYRINGE INTRAVENOUS
Status: DISCONTINUED | OUTPATIENT
Start: 2023-01-01 | End: 2023-01-01

## 2023-01-01 RX ORDER — HEPARIN SODIUM 5000 [USP'U]/.5ML
5000 INJECTION, SOLUTION INTRAVENOUS; SUBCUTANEOUS EVERY 8 HOURS SCHEDULED
Status: DISCONTINUED | OUTPATIENT
Start: 2023-01-01 | End: 2023-01-01 | Stop reason: HOSPADM

## 2023-01-01 RX ORDER — MIRTAZAPINE 15 MG/1
15 TABLET, FILM COATED ORAL AT BEDTIME
Status: DISCONTINUED | OUTPATIENT
Start: 2023-01-01 | End: 2023-01-01

## 2023-01-01 RX ORDER — AMOXICILLIN 250 MG
1 CAPSULE ORAL 2 TIMES DAILY
Status: DISCONTINUED | OUTPATIENT
Start: 2023-01-01 | End: 2023-01-01 | Stop reason: HOSPADM

## 2023-01-01 RX ORDER — TORSEMIDE 20 MG/1
40 TABLET ORAL
Status: DISCONTINUED | OUTPATIENT
Start: 2023-01-01 | End: 2023-01-01

## 2023-01-01 RX ORDER — HEPARIN SODIUM 5000 [USP'U]/.5ML
5000 INJECTION, SOLUTION INTRAVENOUS; SUBCUTANEOUS EVERY 8 HOURS SCHEDULED
Status: DISCONTINUED | OUTPATIENT
Start: 2023-01-01 | End: 2023-01-01

## 2023-01-01 RX ORDER — AMOXICILLIN 250 MG
1 CAPSULE ORAL 2 TIMES DAILY
Status: DISCONTINUED | OUTPATIENT
Start: 2023-01-01 | End: 2023-01-01

## 2023-01-01 RX ORDER — FUROSEMIDE 10 MG/ML
30 INJECTION INTRAMUSCULAR; INTRAVENOUS
Status: DISCONTINUED | OUTPATIENT
Start: 2023-01-01 | End: 2023-01-01

## 2023-01-01 RX ORDER — CLINDAMYCIN PHOSPHATE 900 MG/50ML
900 INJECTION, SOLUTION INTRAVENOUS SEE ADMIN INSTRUCTIONS
Status: DISCONTINUED | OUTPATIENT
Start: 2023-01-01 | End: 2023-01-01

## 2023-01-01 RX ORDER — POTASSIUM CHLORIDE 7.45 MG/ML
10 INJECTION INTRAVENOUS
Status: COMPLETED | OUTPATIENT
Start: 2023-01-01 | End: 2023-01-01

## 2023-01-01 RX ORDER — NITROGLYCERIN 5 MG/ML
VIAL (ML) INTRAVENOUS
Status: DISCONTINUED | OUTPATIENT
Start: 2023-01-01 | End: 2023-01-01 | Stop reason: HOSPADM

## 2023-01-01 RX ORDER — MIRTAZAPINE 7.5 MG/1
7.5 TABLET, FILM COATED ORAL AT BEDTIME
Qty: 90 TABLET | Refills: 3 | Status: SHIPPED | OUTPATIENT
Start: 2023-01-01 | End: 2023-01-01

## 2023-01-01 RX ORDER — SODIUM BICARBONATE TAB 650 MG 650 MG
325 TAB ORAL 2 TIMES DAILY PRN
Status: DISCONTINUED | OUTPATIENT
Start: 2023-01-01 | End: 2023-01-01

## 2023-01-01 RX ORDER — ALBUTEROL SULFATE 90 UG/1
2 AEROSOL, METERED RESPIRATORY (INHALATION) EVERY 6 HOURS PRN
Status: DISCONTINUED | OUTPATIENT
Start: 2023-01-01 | End: 2023-01-01

## 2023-01-01 RX ORDER — POTASSIUM CHLORIDE 750 MG/1
20 TABLET, EXTENDED RELEASE ORAL ONCE
Status: COMPLETED | OUTPATIENT
Start: 2023-01-01 | End: 2023-01-01

## 2023-01-01 RX ORDER — HYDROMORPHONE HCL IN WATER/PF 6 MG/30 ML
0.2 PATIENT CONTROLLED ANALGESIA SYRINGE INTRAVENOUS EVERY 5 MIN PRN
Status: DISCONTINUED | OUTPATIENT
Start: 2023-01-01 | End: 2023-01-01 | Stop reason: HOSPADM

## 2023-01-01 RX ORDER — MIDODRINE HYDROCHLORIDE 5 MG/1
10 TABLET ORAL EVERY 8 HOURS
Status: DISCONTINUED | OUTPATIENT
Start: 2023-01-01 | End: 2023-01-01

## 2023-01-01 RX ORDER — BUPIVACAINE HYDROCHLORIDE 2.5 MG/ML
INJECTION, SOLUTION EPIDURAL; INFILTRATION; INTRACAUDAL PRN
Status: DISCONTINUED | OUTPATIENT
Start: 2023-01-01 | End: 2023-01-01 | Stop reason: HOSPADM

## 2023-01-01 RX ORDER — AMOXICILLIN 250 MG
2 CAPSULE ORAL 2 TIMES DAILY
Status: DISCONTINUED | OUTPATIENT
Start: 2023-01-01 | End: 2023-01-01 | Stop reason: HOSPADM

## 2023-01-01 RX ORDER — FENTANYL CITRATE 50 UG/ML
25 INJECTION, SOLUTION INTRAMUSCULAR; INTRAVENOUS EVERY 5 MIN PRN
Status: DISCONTINUED | OUTPATIENT
Start: 2023-01-01 | End: 2023-01-01 | Stop reason: HOSPADM

## 2023-01-01 RX ORDER — ACETAMINOPHEN 650 MG/1
650 SUPPOSITORY RECTAL EVERY 6 HOURS PRN
Status: DISCONTINUED | OUTPATIENT
Start: 2023-01-01 | End: 2023-01-01 | Stop reason: HOSPADM

## 2023-01-01 RX ORDER — POTASSIUM PHOS IN 0.9 % NACL 15MMOL/250
15 PLASTIC BAG, INJECTION (ML) INTRAVENOUS ONCE
Status: COMPLETED | OUTPATIENT
Start: 2023-01-01 | End: 2023-01-01

## 2023-01-01 RX ORDER — ASPIRIN 300 MG/1
300 SUPPOSITORY RECTAL DAILY
Status: DISCONTINUED | OUTPATIENT
Start: 2023-01-01 | End: 2023-01-01

## 2023-01-01 RX ORDER — ESCITALOPRAM OXALATE 10 MG/1
10 TABLET ORAL DAILY
Status: CANCELLED | OUTPATIENT
Start: 2023-01-01

## 2023-01-01 RX ORDER — SODIUM CHLORIDE 9 MG/ML
INJECTION, SOLUTION INTRAVENOUS CONTINUOUS
Status: DISCONTINUED | OUTPATIENT
Start: 2023-01-01 | End: 2023-01-01

## 2023-01-01 RX ORDER — ALBUTEROL SULFATE 90 UG/1
2 AEROSOL, METERED RESPIRATORY (INHALATION) EVERY 6 HOURS PRN
Status: DISCONTINUED | OUTPATIENT
Start: 2023-01-01 | End: 2023-01-01 | Stop reason: HOSPADM

## 2023-01-01 RX ORDER — ACETAMINOPHEN 650 MG/1
650 SUPPOSITORY RECTAL EVERY 6 HOURS SCHEDULED
Status: DISCONTINUED | OUTPATIENT
Start: 2023-01-01 | End: 2023-01-01

## 2023-01-01 RX ORDER — MEROPENEM 500 MG/1
500 INJECTION, POWDER, FOR SOLUTION INTRAVENOUS EVERY 12 HOURS
Status: COMPLETED | OUTPATIENT
Start: 2023-01-01 | End: 2023-01-01

## 2023-01-01 RX ORDER — POTASSIUM CHLORIDE 20MEQ/15ML
20 LIQUID (ML) ORAL ONCE
Status: COMPLETED | OUTPATIENT
Start: 2023-01-01 | End: 2023-01-01

## 2023-01-01 RX ORDER — DIPHENHYDRAMINE HYDROCHLORIDE 50 MG/ML
25-50 INJECTION INTRAMUSCULAR; INTRAVENOUS
Status: DISCONTINUED | OUTPATIENT
Start: 2023-01-01 | End: 2023-01-01 | Stop reason: HOSPADM

## 2023-01-01 RX ORDER — IPRATROPIUM BROMIDE AND ALBUTEROL SULFATE 2.5; .5 MG/3ML; MG/3ML
3 SOLUTION RESPIRATORY (INHALATION) EVERY 4 HOURS
Status: DISCONTINUED | OUTPATIENT
Start: 2023-01-01 | End: 2023-01-01

## 2023-01-01 RX ORDER — NICOTINE POLACRILEX 4 MG
15-30 LOZENGE BUCCAL
Status: DISCONTINUED | OUTPATIENT
Start: 2023-01-01 | End: 2023-01-01 | Stop reason: HOSPADM

## 2023-01-01 RX ORDER — WARFARIN SODIUM 2 MG/1
TABLET ORAL
Qty: 115 TABLET | Refills: 1 | Status: SHIPPED | OUTPATIENT
Start: 2023-01-01

## 2023-01-01 RX ORDER — WARFARIN SODIUM 2 MG/1
2 TABLET ORAL
Status: CANCELLED | OUTPATIENT
Start: 2023-01-01

## 2023-01-01 RX ORDER — ACETAMINOPHEN 325 MG/1
650 TABLET ORAL EVERY 6 HOURS PRN
Status: DISCONTINUED | OUTPATIENT
Start: 2023-01-01 | End: 2023-01-01 | Stop reason: HOSPADM

## 2023-01-01 RX ORDER — TORSEMIDE 10 MG/1
10 TABLET ORAL DAILY
Status: DISCONTINUED | OUTPATIENT
Start: 2023-01-01 | End: 2023-01-01

## 2023-01-01 RX ORDER — ACETAMINOPHEN 325 MG/1
975 TABLET ORAL EVERY 8 HOURS
Status: DISCONTINUED | OUTPATIENT
Start: 2023-01-01 | End: 2023-01-01

## 2023-01-01 RX ORDER — IOPAMIDOL 755 MG/ML
INJECTION, SOLUTION INTRAVASCULAR
Status: DISCONTINUED | OUTPATIENT
Start: 2023-01-01 | End: 2023-01-01 | Stop reason: HOSPADM

## 2023-01-01 RX ORDER — ONDANSETRON 2 MG/ML
4 INJECTION INTRAMUSCULAR; INTRAVENOUS EVERY 6 HOURS PRN
Status: CANCELLED | OUTPATIENT
Start: 2023-01-01

## 2023-01-01 RX ORDER — VANCOMYCIN HYDROCHLORIDE 50 MG/ML
125 KIT ORAL 2 TIMES DAILY
Status: DISCONTINUED | OUTPATIENT
Start: 2023-01-01 | End: 2023-01-01

## 2023-01-01 RX ORDER — ASPIRIN 81 MG/1
162 TABLET, CHEWABLE ORAL
Status: DISCONTINUED | OUTPATIENT
Start: 2023-01-01 | End: 2023-01-01 | Stop reason: HOSPADM

## 2023-01-01 RX ORDER — DEXTROSE MONOHYDRATE 25 G/50ML
25-50 INJECTION, SOLUTION INTRAVENOUS
Status: DISCONTINUED | OUTPATIENT
Start: 2023-01-01 | End: 2023-01-01 | Stop reason: HOSPADM

## 2023-01-01 RX ORDER — CHLOROTHIAZIDE SODIUM 500 MG/1
500 INJECTION INTRAVENOUS EVERY 12 HOURS
Status: COMPLETED | OUTPATIENT
Start: 2023-01-01 | End: 2023-01-01

## 2023-01-01 RX ORDER — CEPHALEXIN 500 MG/1
500 CAPSULE ORAL 2 TIMES DAILY
Qty: 14 CAPSULE | Refills: 0 | Status: SHIPPED | OUTPATIENT
Start: 2023-01-01 | End: 2023-01-01

## 2023-01-01 RX ORDER — POTASSIUM CHLORIDE 1.5 G/1.58G
20 POWDER, FOR SOLUTION ORAL ONCE
Qty: 1 PACKET | Refills: 0 | Status: COMPLETED | OUTPATIENT
Start: 2023-01-01 | End: 2023-01-01

## 2023-01-01 RX ORDER — ONDANSETRON 2 MG/ML
4 INJECTION INTRAMUSCULAR; INTRAVENOUS EVERY 30 MIN PRN
Status: DISCONTINUED | OUTPATIENT
Start: 2023-01-01 | End: 2023-01-01 | Stop reason: HOSPADM

## 2023-01-01 RX ORDER — AMOXICILLIN 250 MG
1 CAPSULE ORAL 2 TIMES DAILY PRN
Status: DISCONTINUED | OUTPATIENT
Start: 2023-01-01 | End: 2023-01-01 | Stop reason: HOSPADM

## 2023-01-01 RX ORDER — POTASSIUM CHLORIDE 29.8 MG/ML
20 INJECTION INTRAVENOUS ONCE
Status: COMPLETED | OUTPATIENT
Start: 2023-01-01 | End: 2023-01-01

## 2023-01-01 RX ORDER — BUMETANIDE 0.25 MG/ML
3 INJECTION INTRAMUSCULAR; INTRAVENOUS 2 TIMES DAILY
Status: DISCONTINUED | OUTPATIENT
Start: 2023-01-01 | End: 2023-01-01

## 2023-01-01 RX ORDER — MIRTAZAPINE 15 MG/1
15 TABLET, FILM COATED ORAL AT BEDTIME
Qty: 90 TABLET | Refills: 3 | Status: SHIPPED | OUTPATIENT
Start: 2023-01-01

## 2023-01-01 RX ORDER — ASPIRIN 81 MG/1
81 TABLET, CHEWABLE ORAL DAILY
Status: CANCELLED | OUTPATIENT
Start: 2023-01-01

## 2023-01-01 RX ORDER — PROTAMINE SULFATE 10 MG/ML
INJECTION, SOLUTION INTRAVENOUS PRN
Status: DISCONTINUED | OUTPATIENT
Start: 2023-01-01 | End: 2023-01-01

## 2023-01-01 RX ORDER — ASPIRIN 81 MG/1
81 TABLET, CHEWABLE ORAL DAILY
Status: DISCONTINUED | OUTPATIENT
Start: 2023-01-01 | End: 2023-01-01 | Stop reason: HOSPADM

## 2023-01-01 RX ORDER — BISACODYL 10 MG
10 SUPPOSITORY, RECTAL RECTAL DAILY PRN
Status: DISCONTINUED | OUTPATIENT
Start: 2023-01-01 | End: 2024-01-15 | Stop reason: HOSPADM

## 2023-01-01 RX ORDER — VANCOMYCIN HYDROCHLORIDE 125 MG/1
125 CAPSULE ORAL 2 TIMES DAILY
Status: DISCONTINUED | OUTPATIENT
Start: 2023-01-01 | End: 2023-01-01

## 2023-01-01 RX ORDER — ASPIRIN 81 MG/1
81 TABLET, CHEWABLE ORAL DAILY
Status: DISCONTINUED | OUTPATIENT
Start: 2023-01-01 | End: 2024-01-01

## 2023-01-01 RX ORDER — ASPIRIN 325 MG
325 TABLET ORAL ONCE
Status: COMPLETED | OUTPATIENT
Start: 2023-01-01 | End: 2023-01-01

## 2023-01-01 RX ORDER — CHLORHEXIDINE GLUCONATE ORAL RINSE 1.2 MG/ML
15 SOLUTION DENTAL EVERY 12 HOURS
Status: DISCONTINUED | OUTPATIENT
Start: 2023-01-01 | End: 2023-01-01

## 2023-01-01 RX ORDER — DIPHENHYDRAMINE HCL 25 MG
25 TABLET ORAL EVERY 6 HOURS PRN
Status: DISCONTINUED | OUTPATIENT
Start: 2023-01-01 | End: 2024-01-01

## 2023-01-01 RX ORDER — VANCOMYCIN HYDROCHLORIDE 1 G/200ML
1000 INJECTION, SOLUTION INTRAVENOUS
Status: COMPLETED | OUTPATIENT
Start: 2023-01-01 | End: 2023-01-01

## 2023-01-01 RX ORDER — TORSEMIDE 5 MG
10 TABLET ORAL DAILY
Status: DISCONTINUED | OUTPATIENT
Start: 2023-01-01 | End: 2023-01-01 | Stop reason: ALTCHOICE

## 2023-01-01 RX ORDER — LOPERAMIDE HCL 1 MG/7.5ML
2 SOLUTION ORAL 4 TIMES DAILY PRN
Status: CANCELLED | OUTPATIENT
Start: 2023-01-01

## 2023-01-01 RX ORDER — CHLOROTHIAZIDE SODIUM 500 MG/1
500 INJECTION INTRAVENOUS ONCE
Qty: 20 ML | Refills: 0 | Status: COMPLETED | OUTPATIENT
Start: 2023-01-01 | End: 2023-01-01

## 2023-01-01 RX ORDER — FIBRINOGEN (HUMAN) 700-1300MG
1050 KIT INTRAVENOUS ONCE
Status: DISCONTINUED | OUTPATIENT
Start: 2023-01-01 | End: 2023-01-01

## 2023-01-01 RX ORDER — PHENYLEPHRINE HCL IN 0.9% NACL 50MG/250ML
.1-6 PLASTIC BAG, INJECTION (ML) INTRAVENOUS CONTINUOUS
Status: CANCELLED | OUTPATIENT
Start: 2023-01-01

## 2023-01-01 RX ORDER — WARFARIN SODIUM 1 MG/1
2 TABLET ORAL
Status: COMPLETED | OUTPATIENT
Start: 2023-01-01 | End: 2023-01-01

## 2023-01-01 RX ORDER — KETAMINE HYDROCHLORIDE 10 MG/ML
INJECTION INTRAMUSCULAR; INTRAVENOUS PRN
Status: DISCONTINUED | OUTPATIENT
Start: 2023-01-01 | End: 2023-01-01

## 2023-01-01 RX ORDER — POTASSIUM CHLORIDE 750 MG/1
20 TABLET, EXTENDED RELEASE ORAL 2 TIMES DAILY
Status: DISCONTINUED | OUTPATIENT
Start: 2023-01-01 | End: 2023-01-01

## 2023-01-01 RX ORDER — TORSEMIDE 20 MG/1
20 TABLET ORAL DAILY
Status: DISCONTINUED | OUTPATIENT
Start: 2023-01-01 | End: 2023-01-01

## 2023-01-01 RX ORDER — SODIUM CHLORIDE 9 MG/ML
INJECTION, SOLUTION INTRAVENOUS CONTINUOUS
Status: CANCELLED | OUTPATIENT
Start: 2023-01-01

## 2023-01-01 RX ORDER — DIPHENHYDRAMINE HYDROCHLORIDE 50 MG/ML
25 INJECTION INTRAMUSCULAR; INTRAVENOUS EVERY 6 HOURS PRN
Status: DISCONTINUED | OUTPATIENT
Start: 2023-01-01 | End: 2024-01-01

## 2023-01-01 RX ORDER — ATROPINE SULFATE 0.1 MG/ML
0.5 INJECTION INTRAVENOUS
Status: DISCONTINUED | OUTPATIENT
Start: 2023-01-01 | End: 2023-01-01 | Stop reason: HOSPADM

## 2023-01-01 RX ORDER — DIAZEPAM 5 MG
5 TABLET ORAL EVERY 30 MIN PRN
Status: DISCONTINUED | OUTPATIENT
Start: 2023-01-01 | End: 2023-01-01 | Stop reason: HOSPADM

## 2023-01-01 RX ORDER — MIRTAZAPINE 7.5 MG/1
15 TABLET, FILM COATED ORAL AT BEDTIME
Status: DISCONTINUED | OUTPATIENT
Start: 2023-01-01 | End: 2023-01-01 | Stop reason: HOSPADM

## 2023-01-01 RX ORDER — MIRTAZAPINE 15 MG/1
15 TABLET, FILM COATED ORAL AT BEDTIME
Qty: 90 TABLET | Refills: 0 | Status: SHIPPED | OUTPATIENT
Start: 2023-01-01 | End: 2023-01-01

## 2023-01-01 RX ORDER — VANCOMYCIN HYDROCHLORIDE 125 MG/1
125 CAPSULE ORAL EVERY OTHER DAY
Status: DISCONTINUED | OUTPATIENT
Start: 2024-01-01 | End: 2023-01-01

## 2023-01-01 RX ORDER — LOPERAMIDE HCL 1 MG/7.5ML
2 SOLUTION ORAL DAILY
Status: DISCONTINUED | OUTPATIENT
Start: 2023-01-01 | End: 2023-01-01

## 2023-01-01 RX ORDER — BISACODYL 5 MG
5 TABLET, DELAYED RELEASE (ENTERIC COATED) ORAL DAILY PRN
Status: DISCONTINUED | OUTPATIENT
Start: 2023-01-01 | End: 2023-01-01 | Stop reason: HOSPADM

## 2023-01-01 RX ORDER — DEXAMETHASONE SODIUM PHOSPHATE 4 MG/ML
INJECTION, SOLUTION INTRA-ARTICULAR; INTRALESIONAL; INTRAMUSCULAR; INTRAVENOUS; SOFT TISSUE PRN
Status: DISCONTINUED | OUTPATIENT
Start: 2023-01-01 | End: 2023-01-01

## 2023-01-01 RX ORDER — GLYCOPYRROLATE 0.2 MG/ML
INJECTION, SOLUTION INTRAMUSCULAR; INTRAVENOUS PRN
Status: DISCONTINUED | OUTPATIENT
Start: 2023-01-01 | End: 2023-01-01

## 2023-01-01 RX ORDER — GUAIFENESIN/DEXTROMETHORPHAN 100-10MG/5
10 SYRUP ORAL EVERY 4 HOURS
Status: DISCONTINUED | OUTPATIENT
Start: 2023-01-01 | End: 2023-01-01

## 2023-01-01 RX ORDER — BUMETANIDE 1 MG/1
1 TABLET ORAL
Status: DISCONTINUED | OUTPATIENT
Start: 2023-01-01 | End: 2023-01-01

## 2023-01-01 RX ORDER — TORSEMIDE 10 MG/1
TABLET ORAL
Qty: 360 TABLET | Refills: 3
Start: 2023-01-01

## 2023-01-01 RX ORDER — ACETAMINOPHEN 325 MG/1
650 TABLET ORAL EVERY 6 HOURS
Status: DISCONTINUED | OUTPATIENT
Start: 2023-01-01 | End: 2023-01-01

## 2023-01-01 RX ORDER — METHOCARBAMOL 500 MG/1
500 TABLET, FILM COATED ORAL EVERY 8 HOURS PRN
Status: DISCONTINUED | OUTPATIENT
Start: 2023-01-01 | End: 2023-01-01

## 2023-01-01 RX ORDER — SACCHAROMYCES BOULARDII 250 MG
250 CAPSULE ORAL 2 TIMES DAILY
Status: DISCONTINUED | OUTPATIENT
Start: 2023-01-01 | End: 2023-01-01 | Stop reason: HOSPADM

## 2023-01-01 RX ORDER — ENOXAPARIN SODIUM 100 MG/ML
40 INJECTION SUBCUTANEOUS EVERY 24 HOURS
Status: DISCONTINUED | OUTPATIENT
Start: 2023-01-01 | End: 2023-01-01

## 2023-01-01 RX ORDER — WARFARIN SODIUM 2 MG/1
2 TABLET ORAL
COMMUNITY
End: 2023-01-01

## 2023-01-01 RX ORDER — ACETAMINOPHEN 325 MG/1
650 TABLET ORAL EVERY 4 HOURS PRN
Status: CANCELLED | OUTPATIENT
Start: 2023-01-01

## 2023-01-01 RX ORDER — CHLORHEXIDINE GLUCONATE ORAL RINSE 1.2 MG/ML
15 SOLUTION DENTAL EVERY 12 HOURS
Status: CANCELLED | OUTPATIENT
Start: 2023-01-01

## 2023-01-01 RX ORDER — OXYCODONE HYDROCHLORIDE 5 MG/1
5 TABLET ORAL EVERY 4 HOURS PRN
Status: CANCELLED | OUTPATIENT
Start: 2023-01-01

## 2023-01-01 RX ORDER — CHLORHEXIDINE GLUCONATE ORAL RINSE 1.2 MG/ML
10 SOLUTION DENTAL ONCE
Status: DISCONTINUED | OUTPATIENT
Start: 2023-01-01 | End: 2023-01-01 | Stop reason: HOSPADM

## 2023-01-01 RX ORDER — ESCITALOPRAM OXALATE 5 MG/5ML
10 SOLUTION ORAL DAILY
Status: DISCONTINUED | OUTPATIENT
Start: 2023-01-01 | End: 2024-01-01

## 2023-01-01 RX ORDER — MIDODRINE HYDROCHLORIDE 5 MG/1
5 TABLET ORAL 3 TIMES DAILY PRN
Status: DISCONTINUED | OUTPATIENT
Start: 2023-01-01 | End: 2023-01-01 | Stop reason: HOSPADM

## 2023-01-01 RX ORDER — METOPROLOL TARTRATE 25 MG/1
25 TABLET, FILM COATED ORAL 2 TIMES DAILY
Qty: 180 TABLET | Refills: 3 | Status: SHIPPED | OUTPATIENT
Start: 2023-01-01

## 2023-01-01 RX ORDER — NITROGLYCERIN 10 MG/100ML
INJECTION INTRAVENOUS PRN
Status: DISCONTINUED | OUTPATIENT
Start: 2023-01-01 | End: 2023-01-01

## 2023-01-01 RX ORDER — DIPHENHYDRAMINE HCL 25 MG
25 CAPSULE ORAL
Status: DISCONTINUED | OUTPATIENT
Start: 2023-01-01 | End: 2023-01-01 | Stop reason: HOSPADM

## 2023-01-01 RX ORDER — NOREPINEPHRINE BITARTRATE 0.02 MG/ML
INJECTION, SOLUTION INTRAVENOUS CONTINUOUS PRN
Status: DISCONTINUED | OUTPATIENT
Start: 2023-01-01 | End: 2023-01-01

## 2023-01-01 RX ORDER — LIDOCAINE HYDROCHLORIDE 10 MG/ML
INJECTION, SOLUTION INFILTRATION; PERINEURAL PRN
Status: DISCONTINUED | OUTPATIENT
Start: 2023-01-01 | End: 2023-01-01

## 2023-01-01 RX ORDER — AMOXICILLIN 250 MG
1 CAPSULE ORAL 2 TIMES DAILY PRN
Status: CANCELLED | OUTPATIENT
Start: 2023-01-01

## 2023-01-01 RX ORDER — DEXTROSE MONOHYDRATE 25 G/50ML
25-50 INJECTION, SOLUTION INTRAVENOUS
Status: CANCELLED | OUTPATIENT
Start: 2023-01-01

## 2023-01-01 RX ORDER — LOPERAMIDE HCL 1 MG/7.5ML
2 SOLUTION ORAL 4 TIMES DAILY PRN
Status: DISCONTINUED | OUTPATIENT
Start: 2023-01-01 | End: 2023-01-01 | Stop reason: HOSPADM

## 2023-01-01 RX ORDER — HEPARIN SODIUM 5000 [USP'U]/.5ML
5000 INJECTION, SOLUTION INTRAVENOUS; SUBCUTANEOUS EVERY 8 HOURS
Status: DISPENSED | OUTPATIENT
Start: 2023-01-01 | End: 2023-01-01

## 2023-01-01 RX ORDER — SODIUM BICARBONATE TAB 650 MG 650 MG
325 TAB ORAL 2 TIMES DAILY PRN
Status: DISCONTINUED | OUTPATIENT
Start: 2023-01-01 | End: 2024-01-01

## 2023-01-01 RX ORDER — VANCOMYCIN HYDROCHLORIDE 1 G/200ML
1000 INJECTION, SOLUTION INTRAVENOUS
Status: CANCELLED | OUTPATIENT
Start: 2023-01-01

## 2023-01-01 RX ORDER — NICOTINE POLACRILEX 4 MG
15-30 LOZENGE BUCCAL
Status: DISCONTINUED | OUTPATIENT
Start: 2023-01-01 | End: 2024-01-01

## 2023-01-01 RX ORDER — OXYCODONE AND ACETAMINOPHEN 5; 325 MG/1; MG/1
1 TABLET ORAL EVERY 4 HOURS PRN
Status: DISCONTINUED | OUTPATIENT
Start: 2023-01-01 | End: 2023-01-01 | Stop reason: HOSPADM

## 2023-01-01 RX ORDER — HYDROMORPHONE HCL IN WATER/PF 6 MG/30 ML
0.4 PATIENT CONTROLLED ANALGESIA SYRINGE INTRAVENOUS
Status: DISCONTINUED | OUTPATIENT
Start: 2023-01-01 | End: 2023-01-01 | Stop reason: HOSPADM

## 2023-01-01 RX ORDER — LOPERAMIDE HCL 1 MG/7.5ML
2 SOLUTION ORAL 4 TIMES DAILY PRN
Status: DISCONTINUED | OUTPATIENT
Start: 2023-01-01 | End: 2023-01-01

## 2023-01-01 RX ORDER — HYDROXYZINE HYDROCHLORIDE 25 MG/1
25 TABLET, FILM COATED ORAL ONCE
Status: COMPLETED | OUTPATIENT
Start: 2023-01-01 | End: 2023-01-01

## 2023-01-01 RX ADMIN — Medication 250 MG: at 21:03

## 2023-01-01 RX ADMIN — MULTIVIT AND MINERALS-FERROUS GLUCONATE 9 MG IRON/15 ML ORAL LIQUID 15 ML: at 08:51

## 2023-01-01 RX ADMIN — GUAIFENESIN AND DEXTROMETHORPHAN 10 ML: 100; 10 SYRUP ORAL at 15:46

## 2023-01-01 RX ADMIN — FUROSEMIDE 60 MG: 10 INJECTION, SOLUTION INTRAMUSCULAR; INTRAVENOUS at 15:50

## 2023-01-01 RX ADMIN — POTASSIUM CHLORIDE 20 MEQ: 1.5 SOLUTION ORAL at 21:51

## 2023-01-01 RX ADMIN — HEPARIN SODIUM 5000 UNITS: 5000 INJECTION, SOLUTION INTRAVENOUS; SUBCUTANEOUS at 05:57

## 2023-01-01 RX ADMIN — POTASSIUM CHLORIDE 20 MEQ: 1.5 POWDER, FOR SOLUTION ORAL at 08:35

## 2023-01-01 RX ADMIN — FENTANYL CITRATE 25 MCG: 50 INJECTION, SOLUTION INTRAMUSCULAR; INTRAVENOUS at 11:27

## 2023-01-01 RX ADMIN — MIDODRINE HYDROCHLORIDE 10 MG: 5 TABLET ORAL at 21:37

## 2023-01-01 RX ADMIN — PIPERACILLIN AND TAZOBACTAM 3.38 G: 3; .375 INJECTION, POWDER, LYOPHILIZED, FOR SOLUTION INTRAVENOUS at 20:02

## 2023-01-01 RX ADMIN — ALBUTEROL SULFATE 2.5 MG: 2.5 SOLUTION RESPIRATORY (INHALATION) at 11:46

## 2023-01-01 RX ADMIN — ALBUTEROL SULFATE 2.5 MG: 2.5 SOLUTION RESPIRATORY (INHALATION) at 07:39

## 2023-01-01 RX ADMIN — CHLORHEXIDINE GLUCONATE 0.12% ORAL RINSE 15 ML: 1.2 LIQUID ORAL at 08:30

## 2023-01-01 RX ADMIN — ACETAMINOPHEN 650 MG: 325 TABLET, FILM COATED ORAL at 20:44

## 2023-01-01 RX ADMIN — GUAIFENESIN AND DEXTROMETHORPHAN 10 ML: 100; 10 SYRUP ORAL at 11:25

## 2023-01-01 RX ADMIN — HEPARIN SODIUM 5000 UNITS: 5000 INJECTION, SOLUTION INTRAVENOUS; SUBCUTANEOUS at 19:40

## 2023-01-01 RX ADMIN — PIPERACILLIN AND TAZOBACTAM 3.38 G: 3; .375 INJECTION, POWDER, LYOPHILIZED, FOR SOLUTION INTRAVENOUS at 20:33

## 2023-01-01 RX ADMIN — POTASSIUM CHLORIDE 20 MEQ: 1.5 POWDER, FOR SOLUTION ORAL at 20:03

## 2023-01-01 RX ADMIN — POTASSIUM CHLORIDE 20 MEQ: 1.5 SOLUTION ORAL at 12:49

## 2023-01-01 RX ADMIN — Medication 50 MG: at 08:17

## 2023-01-01 RX ADMIN — POTASSIUM CHLORIDE 20 MEQ: 1.5 POWDER, FOR SOLUTION ORAL at 20:12

## 2023-01-01 RX ADMIN — ACETYLCYSTEINE 2 ML: 200 SOLUTION ORAL; RESPIRATORY (INHALATION) at 05:13

## 2023-01-01 RX ADMIN — PHENYLEPHRINE HYDROCHLORIDE 0.5 MCG/KG/MIN: 10 INJECTION INTRAVENOUS at 08:26

## 2023-01-01 RX ADMIN — MEROPENEM 500 MG: 500 INJECTION, POWDER, FOR SOLUTION INTRAVENOUS at 08:31

## 2023-01-01 RX ADMIN — ACETYLCYSTEINE 2 ML: 200 SOLUTION ORAL; RESPIRATORY (INHALATION) at 08:16

## 2023-01-01 RX ADMIN — WARFARIN SODIUM 3 MG: 3 TABLET ORAL at 17:54

## 2023-01-01 RX ADMIN — HEPARIN SODIUM 5000 UNITS: 5000 INJECTION, SOLUTION INTRAVENOUS; SUBCUTANEOUS at 03:55

## 2023-01-01 RX ADMIN — POTASSIUM CHLORIDE 20 MEQ: 750 TABLET, EXTENDED RELEASE ORAL at 12:12

## 2023-01-01 RX ADMIN — ESCITALOPRAM OXALATE 10 MG: 10 TABLET ORAL at 09:00

## 2023-01-01 RX ADMIN — HEPARIN SODIUM 5000 UNITS: 5000 INJECTION, SOLUTION INTRAVENOUS; SUBCUTANEOUS at 12:59

## 2023-01-01 RX ADMIN — ALBUTEROL SULFATE 2.5 MG: 2.5 SOLUTION RESPIRATORY (INHALATION) at 15:44

## 2023-01-01 RX ADMIN — ALBUTEROL SULFATE 2.5 MG: 2.5 SOLUTION RESPIRATORY (INHALATION) at 07:07

## 2023-01-01 RX ADMIN — HEPARIN SODIUM 5000 UNITS: 5000 INJECTION, SOLUTION INTRAVENOUS; SUBCUTANEOUS at 21:50

## 2023-01-01 RX ADMIN — FUROSEMIDE 60 MG: 10 INJECTION, SOLUTION INTRAMUSCULAR; INTRAVENOUS at 10:53

## 2023-01-01 RX ADMIN — ALBUTEROL SULFATE 2.5 MG: 2.5 SOLUTION RESPIRATORY (INHALATION) at 19:34

## 2023-01-01 RX ADMIN — ACETYLCYSTEINE 2 ML: 200 SOLUTION ORAL; RESPIRATORY (INHALATION) at 19:50

## 2023-01-01 RX ADMIN — DEXAMETHASONE SODIUM PHOSPHATE 40 MG: 10 INJECTION INTRAMUSCULAR; INTRAVENOUS at 22:23

## 2023-01-01 RX ADMIN — Medication 2 MG: at 08:29

## 2023-01-01 RX ADMIN — GUAIFENESIN AND DEXTROMETHORPHAN HYDROBROMIDE 1 TABLET: 600; 30 TABLET, EXTENDED RELEASE ORAL at 13:02

## 2023-01-01 RX ADMIN — HYDROXYZINE HYDROCHLORIDE 25 MG: 25 TABLET ORAL at 08:43

## 2023-01-01 RX ADMIN — WARFARIN SODIUM 1 MG: 1 TABLET ORAL at 18:04

## 2023-01-01 RX ADMIN — ESCITALOPRAM OXALATE 10 MG: 10 TABLET ORAL at 07:32

## 2023-01-01 RX ADMIN — MEROPENEM 1000 MG: 1 INJECTION, POWDER, FOR SOLUTION INTRAVENOUS at 20:29

## 2023-01-01 RX ADMIN — ACETYLCYSTEINE 2 ML: 200 SOLUTION ORAL; RESPIRATORY (INHALATION) at 11:46

## 2023-01-01 RX ADMIN — POTASSIUM CHLORIDE 20 MEQ: 1.5 POWDER, FOR SOLUTION ORAL at 08:34

## 2023-01-01 RX ADMIN — HEPARIN SODIUM 5000 UNITS: 5000 INJECTION, SOLUTION INTRAVENOUS; SUBCUTANEOUS at 19:54

## 2023-01-01 RX ADMIN — MIRTAZAPINE 15 MG: 15 TABLET, FILM COATED ORAL at 21:51

## 2023-01-01 RX ADMIN — ACETAMINOPHEN 650 MG: 325 TABLET, FILM COATED ORAL at 22:04

## 2023-01-01 RX ADMIN — POTASSIUM PHOSPHATE, MONOBASIC POTASSIUM PHOSPHATE, DIBASIC 9 MMOL: 224; 236 INJECTION, SOLUTION, CONCENTRATE INTRAVENOUS at 21:58

## 2023-01-01 RX ADMIN — Medication 2 MG: at 08:00

## 2023-01-01 RX ADMIN — HEPARIN SODIUM 5000 UNITS: 5000 INJECTION, SOLUTION INTRAVENOUS; SUBCUTANEOUS at 19:31

## 2023-01-01 RX ADMIN — HYDROXYZINE HYDROCHLORIDE 25 MG: 25 TABLET, FILM COATED ORAL at 23:36

## 2023-01-01 RX ADMIN — FAMOTIDINE 20 MG: 20 TABLET ORAL at 08:45

## 2023-01-01 RX ADMIN — ACETAMINOPHEN 650 MG: 325 TABLET, FILM COATED ORAL at 23:26

## 2023-01-01 RX ADMIN — IPRATROPIUM BROMIDE AND ALBUTEROL SULFATE 3 ML: .5; 3 SOLUTION RESPIRATORY (INHALATION) at 16:14

## 2023-01-01 RX ADMIN — BUMETANIDE 2 MG: 0.25 INJECTION INTRAMUSCULAR; INTRAVENOUS at 11:37

## 2023-01-01 RX ADMIN — HYDROCORTISONE SODIUM SUCCINATE 50 MG: 100 INJECTION, POWDER, FOR SOLUTION INTRAMUSCULAR; INTRAVENOUS at 04:32

## 2023-01-01 RX ADMIN — TORSEMIDE 40 MG: 20 TABLET ORAL at 08:25

## 2023-01-01 RX ADMIN — Medication 250 MG: at 07:32

## 2023-01-01 RX ADMIN — ACETYLCYSTEINE 2 ML: 200 SOLUTION ORAL; RESPIRATORY (INHALATION) at 08:39

## 2023-01-01 RX ADMIN — Medication 250 MG: at 08:00

## 2023-01-01 RX ADMIN — MEROPENEM 500 MG: 500 INJECTION, POWDER, FOR SOLUTION INTRAVENOUS at 09:05

## 2023-01-01 RX ADMIN — ALBUTEROL SULFATE 2.5 MG: 2.5 SOLUTION RESPIRATORY (INHALATION) at 11:28

## 2023-01-01 RX ADMIN — PANTOPRAZOLE SODIUM 40 MG: 40 INJECTION, POWDER, FOR SOLUTION INTRAVENOUS at 08:29

## 2023-01-01 RX ADMIN — PANTOPRAZOLE SODIUM 40 MG: 40 INJECTION, POWDER, FOR SOLUTION INTRAVENOUS at 11:02

## 2023-01-01 RX ADMIN — ESCITALOPRAM OXALATE 10 MG: 10 TABLET ORAL at 08:09

## 2023-01-01 RX ADMIN — GUAIFENESIN AND DEXTROMETHORPHAN 10 ML: 100; 10 SYRUP ORAL at 11:21

## 2023-01-01 RX ADMIN — ACETAMINOPHEN 650 MG: 325 TABLET, FILM COATED ORAL at 20:04

## 2023-01-01 RX ADMIN — PIPERACILLIN AND TAZOBACTAM 3.38 G: 3; .375 INJECTION, POWDER, LYOPHILIZED, FOR SOLUTION INTRAVENOUS at 03:21

## 2023-01-01 RX ADMIN — ASPIRIN 81 MG CHEWABLE TABLET 81 MG: 81 TABLET CHEWABLE at 08:35

## 2023-01-01 RX ADMIN — MULTIVIT AND MINERALS-FERROUS GLUCONATE 9 MG IRON/15 ML ORAL LIQUID 15 ML: at 08:30

## 2023-01-01 RX ADMIN — MULTIVIT AND MINERALS-FERROUS GLUCONATE 9 MG IRON/15 ML ORAL LIQUID 15 ML: at 08:25

## 2023-01-01 RX ADMIN — METOPROLOL TARTRATE 25 MG: 25 TABLET, FILM COATED ORAL at 08:40

## 2023-01-01 RX ADMIN — MICONAZOLE NITRATE: 20 POWDER TOPICAL at 08:31

## 2023-01-01 RX ADMIN — ACETYLCYSTEINE 2 ML: 200 SOLUTION ORAL; RESPIRATORY (INHALATION) at 19:59

## 2023-01-01 RX ADMIN — GUAIFENESIN AND DEXTROMETHORPHAN 10 ML: 100; 10 SYRUP ORAL at 00:47

## 2023-01-01 RX ADMIN — WARFARIN SODIUM 5 MG: 5 TABLET ORAL at 18:14

## 2023-01-01 RX ADMIN — FAMOTIDINE 20 MG: 20 TABLET ORAL at 09:03

## 2023-01-01 RX ADMIN — LOPERAMIDE HCL 2 MG: 1 SOLUTION ORAL at 10:29

## 2023-01-01 RX ADMIN — METOPROLOL TARTRATE 25 MG: 25 TABLET, FILM COATED ORAL at 09:19

## 2023-01-01 RX ADMIN — ESCITALOPRAM OXALATE 10 MG: 10 TABLET ORAL at 08:48

## 2023-01-01 RX ADMIN — HEPARIN SODIUM 5000 UNITS: 5000 INJECTION, SOLUTION INTRAVENOUS; SUBCUTANEOUS at 04:22

## 2023-01-01 RX ADMIN — FUROSEMIDE 60 MG: 10 INJECTION, SOLUTION INTRAMUSCULAR; INTRAVENOUS at 14:39

## 2023-01-01 RX ADMIN — FAMOTIDINE 20 MG: 20 TABLET ORAL at 09:01

## 2023-01-01 RX ADMIN — MICONAZOLE NITRATE: 20 POWDER TOPICAL at 08:22

## 2023-01-01 RX ADMIN — AMINOCAPROIC ACID 5 G: 250 INJECTION, SOLUTION INTRAVENOUS at 14:00

## 2023-01-01 RX ADMIN — ALBUTEROL SULFATE 2.5 MG: 2.5 SOLUTION RESPIRATORY (INHALATION) at 02:40

## 2023-01-01 RX ADMIN — ALBUTEROL SULFATE 2.5 MG: 2.5 SOLUTION RESPIRATORY (INHALATION) at 13:45

## 2023-01-01 RX ADMIN — UMECLIDINIUM 1 PUFF: 62.5 AEROSOL, POWDER ORAL at 13:01

## 2023-01-01 RX ADMIN — POTASSIUM CHLORIDE 20 MEQ: 1.5 SOLUTION ORAL at 20:55

## 2023-01-01 RX ADMIN — ESCITALOPRAM 10 MG: 5 SOLUTION ORAL at 08:21

## 2023-01-01 RX ADMIN — ALBUTEROL SULFATE 2.5 MG: 2.5 SOLUTION RESPIRATORY (INHALATION) at 04:30

## 2023-01-01 RX ADMIN — ASPIRIN 81 MG CHEWABLE TABLET 81 MG: 81 TABLET CHEWABLE at 08:48

## 2023-01-01 RX ADMIN — Medication 2 MG: at 08:14

## 2023-01-01 RX ADMIN — ACETYLCYSTEINE 2 ML: 200 SOLUTION ORAL; RESPIRATORY (INHALATION) at 07:41

## 2023-01-01 RX ADMIN — SPIRONOLACTONE 25 MG: 25 TABLET ORAL at 17:39

## 2023-01-01 RX ADMIN — FENTANYL CITRATE 50 MCG: 50 INJECTION INTRAMUSCULAR; INTRAVENOUS at 14:55

## 2023-01-01 RX ADMIN — POTASSIUM CHLORIDE 20 MEQ: 29.8 INJECTION, SOLUTION INTRAVENOUS at 00:57

## 2023-01-01 RX ADMIN — ACETAMINOPHEN 650 MG: 650 SUPPOSITORY RECTAL at 12:05

## 2023-01-01 RX ADMIN — ACETYLCYSTEINE 2 ML: 200 SOLUTION ORAL; RESPIRATORY (INHALATION) at 08:09

## 2023-01-01 RX ADMIN — ACETYLCYSTEINE 2 ML: 200 SOLUTION ORAL; RESPIRATORY (INHALATION) at 20:52

## 2023-01-01 RX ADMIN — FENTANYL CITRATE 100 MCG: 50 INJECTION INTRAMUSCULAR; INTRAVENOUS at 19:09

## 2023-01-01 RX ADMIN — CEFAZOLIN 1 G: 1 INJECTION, POWDER, FOR SOLUTION INTRAMUSCULAR; INTRAVENOUS at 02:03

## 2023-01-01 RX ADMIN — ESCITALOPRAM 10 MG: 5 SOLUTION ORAL at 08:45

## 2023-01-01 RX ADMIN — VANCOMYCIN HYDROCHLORIDE 125 MG: KIT at 10:31

## 2023-01-01 RX ADMIN — Medication 5 MG: at 23:57

## 2023-01-01 RX ADMIN — ASPIRIN 81 MG CHEWABLE TABLET 243 MG: 81 TABLET CHEWABLE at 09:10

## 2023-01-01 RX ADMIN — POTASSIUM CHLORIDE 20 MEQ: 29.8 INJECTION, SOLUTION INTRAVENOUS at 07:52

## 2023-01-01 RX ADMIN — PIPERACILLIN AND TAZOBACTAM 3.38 G: 3; .375 INJECTION, POWDER, LYOPHILIZED, FOR SOLUTION INTRAVENOUS at 15:22

## 2023-01-01 RX ADMIN — PIPERACILLIN AND TAZOBACTAM 3.38 G: 3; .375 INJECTION, POWDER, LYOPHILIZED, FOR SOLUTION INTRAVENOUS at 08:39

## 2023-01-01 RX ADMIN — Medication 5 MG: at 21:31

## 2023-01-01 RX ADMIN — ONDANSETRON 4 MG: 2 INJECTION INTRAMUSCULAR; INTRAVENOUS at 18:35

## 2023-01-01 RX ADMIN — SODIUM CHLORIDE, POTASSIUM CHLORIDE, SODIUM LACTATE AND CALCIUM CHLORIDE: 600; 310; 30; 20 INJECTION, SOLUTION INTRAVENOUS at 08:00

## 2023-01-01 RX ADMIN — INSULIN HUMAN 2 UNITS/HR: 1 INJECTION, SOLUTION INTRAVENOUS at 21:59

## 2023-01-01 RX ADMIN — ALBUTEROL SULFATE 2.5 MG: 2.5 SOLUTION RESPIRATORY (INHALATION) at 07:19

## 2023-01-01 RX ADMIN — POTASSIUM CHLORIDE 20 MEQ: 750 TABLET, EXTENDED RELEASE ORAL at 18:12

## 2023-01-01 RX ADMIN — MICONAZOLE NITRATE: 20 POWDER TOPICAL at 21:21

## 2023-01-01 RX ADMIN — MICONAZOLE NITRATE: 20 POWDER TOPICAL at 20:36

## 2023-01-01 RX ADMIN — ACETYLCYSTEINE 2 ML: 200 SOLUTION ORAL; RESPIRATORY (INHALATION) at 13:40

## 2023-01-01 RX ADMIN — BUMETANIDE 1 MG: 1 TABLET ORAL at 08:15

## 2023-01-01 RX ADMIN — Medication 250 MG: at 20:12

## 2023-01-01 RX ADMIN — Medication 250 MG: at 21:10

## 2023-01-01 RX ADMIN — ALBUTEROL SULFATE 2.5 MG: 2.5 SOLUTION RESPIRATORY (INHALATION) at 08:39

## 2023-01-01 RX ADMIN — MICONAZOLE NITRATE: 20 POWDER TOPICAL at 21:03

## 2023-01-01 RX ADMIN — Medication 5 MG: at 01:06

## 2023-01-01 RX ADMIN — TORSEMIDE 40 MG: 20 TABLET ORAL at 17:39

## 2023-01-01 RX ADMIN — ACETYLCYSTEINE 2 ML: 200 SOLUTION ORAL; RESPIRATORY (INHALATION) at 07:38

## 2023-01-01 RX ADMIN — PHENYLEPHRINE HYDROCHLORIDE 100 MCG: 10 INJECTION INTRAVENOUS at 15:31

## 2023-01-01 RX ADMIN — ALBUTEROL SULFATE 2.5 MG: 2.5 SOLUTION RESPIRATORY (INHALATION) at 15:39

## 2023-01-01 RX ADMIN — POTASSIUM CHLORIDE 20 MEQ: 750 TABLET, EXTENDED RELEASE ORAL at 12:59

## 2023-01-01 RX ADMIN — ALBUTEROL SULFATE 2.5 MG: 2.5 SOLUTION RESPIRATORY (INHALATION) at 04:09

## 2023-01-01 RX ADMIN — PHENYLEPHRINE HYDROCHLORIDE 50 MCG: 10 INJECTION INTRAVENOUS at 15:55

## 2023-01-01 RX ADMIN — EPINEPHRINE 10 MCG: 1 INJECTION INTRAMUSCULAR; INTRAVENOUS; SUBCUTANEOUS at 17:56

## 2023-01-01 RX ADMIN — ESCITALOPRAM 10 MG: 5 SOLUTION ORAL at 08:30

## 2023-01-01 RX ADMIN — INSULIN ASPART 1 UNITS: 100 INJECTION, SOLUTION INTRAVENOUS; SUBCUTANEOUS at 04:54

## 2023-01-01 RX ADMIN — TORSEMIDE 10 MG: 10 TABLET ORAL at 08:36

## 2023-01-01 RX ADMIN — ALBUTEROL SULFATE 2.5 MG: 2.5 SOLUTION RESPIRATORY (INHALATION) at 04:42

## 2023-01-01 RX ADMIN — CHLOROTHIAZIDE SODIUM 500 MG: 500 INJECTION, POWDER, LYOPHILIZED, FOR SOLUTION INTRAVENOUS at 23:56

## 2023-01-01 RX ADMIN — LIDOCAINE HYDROCHLORIDE 300 MG: 10 INJECTION, SOLUTION EPIDURAL; INFILTRATION; INTRACAUDAL; PERINEURAL at 13:36

## 2023-01-01 RX ADMIN — POTASSIUM CHLORIDE 40 MEQ: 1.5 POWDER, FOR SOLUTION ORAL at 11:02

## 2023-01-01 RX ADMIN — MIRTAZAPINE 15 MG: 15 TABLET, FILM COATED ORAL at 20:00

## 2023-01-01 RX ADMIN — ACETYLCYSTEINE 2 ML: 200 SOLUTION ORAL; RESPIRATORY (INHALATION) at 12:36

## 2023-01-01 RX ADMIN — FAMOTIDINE 20 MG: 20 TABLET ORAL at 08:35

## 2023-01-01 RX ADMIN — SPIRONOLACTONE 25 MG: 25 TABLET ORAL at 08:31

## 2023-01-01 RX ADMIN — CHLOROTHIAZIDE SODIUM 500 MG: 500 INJECTION, POWDER, LYOPHILIZED, FOR SOLUTION INTRAVENOUS at 22:00

## 2023-01-01 RX ADMIN — FENTANYL CITRATE 25 MCG: 50 INJECTION, SOLUTION INTRAMUSCULAR; INTRAVENOUS at 13:18

## 2023-01-01 RX ADMIN — SODIUM BICARBONATE 325 MG: 650 TABLET ORAL at 17:44

## 2023-01-01 RX ADMIN — EPINEPHRINE 0.07 MCG/KG/MIN: 1 INJECTION INTRAMUSCULAR; INTRAVENOUS; SUBCUTANEOUS at 20:17

## 2023-01-01 RX ADMIN — FUROSEMIDE 40 MG: 10 INJECTION, SOLUTION INTRAVENOUS at 03:54

## 2023-01-01 RX ADMIN — DIAZEPAM 5 MG: 5 TABLET ORAL at 13:24

## 2023-01-01 RX ADMIN — ALBUTEROL SULFATE 2.5 MG: 2.5 SOLUTION RESPIRATORY (INHALATION) at 20:30

## 2023-01-01 RX ADMIN — SPIRONOLACTONE 25 MG: 25 TABLET ORAL at 08:09

## 2023-01-01 RX ADMIN — SODIUM CHLORIDE, POTASSIUM CHLORIDE, SODIUM LACTATE AND CALCIUM CHLORIDE: 600; 310; 30; 20 INJECTION, SOLUTION INTRAVENOUS at 14:00

## 2023-01-01 RX ADMIN — SIMETHICONE 40 MG: 20 EMULSION ORAL at 13:54

## 2023-01-01 RX ADMIN — KETAMINE HYDROCHLORIDE 5 MG: 10 INJECTION INTRAMUSCULAR; INTRAVENOUS at 09:18

## 2023-01-01 RX ADMIN — HYDROMORPHONE HYDROCHLORIDE 0.2 MG: 0.2 INJECTION, SOLUTION INTRAMUSCULAR; INTRAVENOUS; SUBCUTANEOUS at 22:03

## 2023-01-01 RX ADMIN — SODIUM BICARBONATE 325 MG: 650 TABLET ORAL at 01:41

## 2023-01-01 RX ADMIN — MULTIVIT AND MINERALS-FERROUS GLUCONATE 9 MG IRON/15 ML ORAL LIQUID 15 ML: at 08:14

## 2023-01-01 RX ADMIN — WARFARIN SODIUM 3 MG: 3 TABLET ORAL at 18:50

## 2023-01-01 RX ADMIN — ACETYLCYSTEINE 2 ML: 200 SOLUTION ORAL; RESPIRATORY (INHALATION) at 20:36

## 2023-01-01 RX ADMIN — NOREPINEPHRINE BITARTRATE 0.03 MCG/KG/MIN: 0.06 INJECTION, SOLUTION INTRAVENOUS at 20:27

## 2023-01-01 RX ADMIN — PIPERACILLIN AND TAZOBACTAM 3.38 G: 3; .375 INJECTION, POWDER, LYOPHILIZED, FOR SOLUTION INTRAVENOUS at 14:01

## 2023-01-01 RX ADMIN — HEPARIN SODIUM 5000 UNITS: 5000 INJECTION, SOLUTION INTRAVENOUS; SUBCUTANEOUS at 13:15

## 2023-01-01 RX ADMIN — IPRATROPIUM BROMIDE AND ALBUTEROL SULFATE 3 ML: .5; 3 SOLUTION RESPIRATORY (INHALATION) at 08:19

## 2023-01-01 RX ADMIN — Medication 2 MG: at 12:59

## 2023-01-01 RX ADMIN — CHLORHEXIDINE GLUCONATE 15 ML: 1.2 RINSE ORAL at 08:30

## 2023-01-01 RX ADMIN — ACETYLCYSTEINE 2 ML: 200 SOLUTION ORAL; RESPIRATORY (INHALATION) at 07:53

## 2023-01-01 RX ADMIN — PHENYLEPHRINE HYDROCHLORIDE 100 MCG: 10 INJECTION INTRAVENOUS at 08:23

## 2023-01-01 RX ADMIN — POTASSIUM CHLORIDE 40 MEQ: 40 SOLUTION ORAL at 17:50

## 2023-01-01 RX ADMIN — HEPARIN SODIUM 5000 UNITS: 5000 INJECTION, SOLUTION INTRAVENOUS; SUBCUTANEOUS at 03:59

## 2023-01-01 RX ADMIN — ALBUTEROL SULFATE 2.5 MG: 2.5 SOLUTION RESPIRATORY (INHALATION) at 08:12

## 2023-01-01 RX ADMIN — Medication 15 ML: at 08:30

## 2023-01-01 RX ADMIN — ALBUTEROL SULFATE 2.5 MG: 2.5 SOLUTION RESPIRATORY (INHALATION) at 19:13

## 2023-01-01 RX ADMIN — PANTOPRAZOLE SODIUM 40 MG: 40 INJECTION, POWDER, FOR SOLUTION INTRAVENOUS at 08:18

## 2023-01-01 RX ADMIN — FAMOTIDINE 20 MG: 20 TABLET ORAL at 08:01

## 2023-01-01 RX ADMIN — METOPROLOL TARTRATE 25 MG: 25 TABLET, FILM COATED ORAL at 20:53

## 2023-01-01 RX ADMIN — MICONAZOLE NITRATE: 20 POWDER TOPICAL at 20:57

## 2023-01-01 RX ADMIN — ACETYLCYSTEINE 2 ML: 200 SOLUTION ORAL; RESPIRATORY (INHALATION) at 19:47

## 2023-01-01 RX ADMIN — Medication 250 MG: at 08:30

## 2023-01-01 RX ADMIN — BUMETANIDE 2 MG: 0.25 INJECTION INTRAMUSCULAR; INTRAVENOUS at 16:21

## 2023-01-01 RX ADMIN — ALBUTEROL SULFATE 2.5 MG: 2.5 SOLUTION RESPIRATORY (INHALATION) at 19:41

## 2023-01-01 RX ADMIN — ANORECTAL OINTMENT: 15.7; .44; 24; 20.6 OINTMENT TOPICAL at 08:46

## 2023-01-01 RX ADMIN — DEXAMETHASONE SODIUM PHOSPHATE 40 MG: 10 INJECTION INTRAMUSCULAR; INTRAVENOUS at 22:02

## 2023-01-01 RX ADMIN — POTASSIUM CHLORIDE 20 MEQ: 1.5 POWDER, FOR SOLUTION ORAL at 22:39

## 2023-01-01 RX ADMIN — Medication 15 ML: at 07:32

## 2023-01-01 RX ADMIN — ACETYLCYSTEINE 2 ML: 200 SOLUTION ORAL; RESPIRATORY (INHALATION) at 16:19

## 2023-01-01 RX ADMIN — ALBUTEROL SULFATE 2.5 MG: 2.5 SOLUTION RESPIRATORY (INHALATION) at 11:36

## 2023-01-01 RX ADMIN — Medication 250 MG: at 20:16

## 2023-01-01 RX ADMIN — EPINEPHRINE 0.05 MCG/KG/MIN: 1 INJECTION INTRAMUSCULAR; INTRAVENOUS; SUBCUTANEOUS at 17:49

## 2023-01-01 RX ADMIN — ACETYLCYSTEINE 2 ML: 200 SOLUTION ORAL; RESPIRATORY (INHALATION) at 21:25

## 2023-01-01 RX ADMIN — Medication 250 MG: at 08:23

## 2023-01-01 RX ADMIN — HEPARIN SODIUM 5000 UNITS: 5000 INJECTION, SOLUTION INTRAVENOUS; SUBCUTANEOUS at 05:42

## 2023-01-01 RX ADMIN — CHLORHEXIDINE GLUCONATE 0.12% ORAL RINSE 15 ML: 1.2 LIQUID ORAL at 09:25

## 2023-01-01 RX ADMIN — GUAIFENESIN AND DEXTROMETHORPHAN 10 ML: 100; 10 SYRUP ORAL at 03:44

## 2023-01-01 RX ADMIN — HYDROCORTISONE SODIUM SUCCINATE 50 MG: 100 INJECTION, POWDER, FOR SOLUTION INTRAMUSCULAR; INTRAVENOUS at 20:52

## 2023-01-01 RX ADMIN — MULTIVIT AND MINERALS-FERROUS GLUCONATE 9 MG IRON/15 ML ORAL LIQUID 15 ML: at 08:00

## 2023-01-01 RX ADMIN — MIRTAZAPINE 15 MG: 15 TABLET, FILM COATED ORAL at 20:52

## 2023-01-01 RX ADMIN — PIPERACILLIN AND TAZOBACTAM 3.38 G: 3; .375 INJECTION, POWDER, LYOPHILIZED, FOR SOLUTION INTRAVENOUS at 14:39

## 2023-01-01 RX ADMIN — LOPERAMIDE HCL 2 MG: 1 SOLUTION ORAL at 19:52

## 2023-01-01 RX ADMIN — HYDROXYZINE HYDROCHLORIDE 25 MG: 25 TABLET ORAL at 00:54

## 2023-01-01 RX ADMIN — ENOXAPARIN SODIUM 40 MG: 40 INJECTION SUBCUTANEOUS at 21:10

## 2023-01-01 RX ADMIN — PANCRELIPASE LIPASE, PANCRELIPASE PROTEASE, PANCRELIPASE AMYLASE 1 CAPSULE: 5000; 17000; 24000 CAPSULE, DELAYED RELEASE ORAL at 11:10

## 2023-01-01 RX ADMIN — MIRTAZAPINE 15 MG: 15 TABLET, FILM COATED ORAL at 20:35

## 2023-01-01 RX ADMIN — Medication 250 MG: at 19:31

## 2023-01-01 RX ADMIN — Medication 2 MG: at 12:49

## 2023-01-01 RX ADMIN — PANCRELIPASE LIPASE, PANCRELIPASE PROTEASE, PANCRELIPASE AMYLASE 1 CAPSULE: 5000; 17000; 24000 CAPSULE, DELAYED RELEASE ORAL at 17:44

## 2023-01-01 RX ADMIN — MIRTAZAPINE 15 MG: 15 TABLET, FILM COATED ORAL at 22:23

## 2023-01-01 RX ADMIN — ESCITALOPRAM 10 MG: 5 SOLUTION ORAL at 09:00

## 2023-01-01 RX ADMIN — PIPERACILLIN AND TAZOBACTAM 3.38 G: 3; .375 INJECTION, POWDER, LYOPHILIZED, FOR SOLUTION INTRAVENOUS at 14:54

## 2023-01-01 RX ADMIN — HEPARIN SODIUM 5000 UNITS: 10000 INJECTION, SOLUTION INTRAVENOUS; SUBCUTANEOUS at 06:10

## 2023-01-01 RX ADMIN — HYDROCORTISONE SODIUM SUCCINATE 50 MG: 100 INJECTION, POWDER, FOR SOLUTION INTRAMUSCULAR; INTRAVENOUS at 12:44

## 2023-01-01 RX ADMIN — Medication 5 MG: at 20:41

## 2023-01-01 RX ADMIN — HEPARIN SODIUM 5000 UNITS: 5000 INJECTION, SOLUTION INTRAVENOUS; SUBCUTANEOUS at 21:54

## 2023-01-01 RX ADMIN — Medication 2 MG: at 08:45

## 2023-01-01 RX ADMIN — KETAMINE HYDROCHLORIDE 5 MG: 10 INJECTION INTRAMUSCULAR; INTRAVENOUS at 09:25

## 2023-01-01 RX ADMIN — ACETYLCYSTEINE 2 ML: 200 SOLUTION ORAL; RESPIRATORY (INHALATION) at 11:31

## 2023-01-01 RX ADMIN — HYDROXYZINE HYDROCHLORIDE 25 MG: 25 TABLET ORAL at 23:26

## 2023-01-01 RX ADMIN — EMPAGLIFLOZIN 10 MG: 10 TABLET, FILM COATED ORAL at 07:53

## 2023-01-01 RX ADMIN — IPRATROPIUM BROMIDE AND ALBUTEROL SULFATE 3 ML: .5; 3 SOLUTION RESPIRATORY (INHALATION) at 04:49

## 2023-01-01 RX ADMIN — Medication 5 MG: at 21:11

## 2023-01-01 RX ADMIN — IPRATROPIUM BROMIDE AND ALBUTEROL SULFATE 3 ML: .5; 3 SOLUTION RESPIRATORY (INHALATION) at 16:51

## 2023-01-01 RX ADMIN — ACETAMINOPHEN 650 MG: 325 TABLET, FILM COATED ORAL at 02:18

## 2023-01-01 RX ADMIN — ONDANSETRON 4 MG: 2 INJECTION INTRAMUSCULAR; INTRAVENOUS at 18:17

## 2023-01-01 RX ADMIN — ACETAMINOPHEN 650 MG: 325 TABLET, FILM COATED ORAL at 08:09

## 2023-01-01 RX ADMIN — ACETAMINOPHEN 650 MG: 325 TABLET, FILM COATED ORAL at 21:11

## 2023-01-01 RX ADMIN — GUAIFENESIN AND DEXTROMETHORPHAN 10 ML: 100; 10 SYRUP ORAL at 08:34

## 2023-01-01 RX ADMIN — MULTIVIT AND MINERALS-FERROUS GLUCONATE 9 MG IRON/15 ML ORAL LIQUID 15 ML: at 08:45

## 2023-01-01 RX ADMIN — BUMETANIDE 2 MG: 0.25 INJECTION INTRAMUSCULAR; INTRAVENOUS at 11:03

## 2023-01-01 RX ADMIN — WARFARIN SODIUM 4 MG: 4 TABLET ORAL at 17:31

## 2023-01-01 RX ADMIN — ACETAMINOPHEN 650 MG: 325 TABLET, FILM COATED ORAL at 00:38

## 2023-01-01 RX ADMIN — METOPROLOL TARTRATE 25 MG: 25 TABLET, FILM COATED ORAL at 09:01

## 2023-01-01 RX ADMIN — ESCITALOPRAM OXALATE 10 MG: 10 TABLET ORAL at 08:03

## 2023-01-01 RX ADMIN — SIMETHICONE 40 MG: 20 EMULSION ORAL at 21:50

## 2023-01-01 RX ADMIN — ALBUTEROL SULFATE 2.5 MG: 2.5 SOLUTION RESPIRATORY (INHALATION) at 07:28

## 2023-01-01 RX ADMIN — CHLORHEXIDINE GLUCONATE 15 ML: 1.2 RINSE ORAL at 23:38

## 2023-01-01 RX ADMIN — PHENYLEPHRINE HYDROCHLORIDE 100 MCG: 10 INJECTION INTRAVENOUS at 12:39

## 2023-01-01 RX ADMIN — INSULIN ASPART 1 UNITS: 100 INJECTION, SOLUTION INTRAVENOUS; SUBCUTANEOUS at 20:56

## 2023-01-01 RX ADMIN — ACETAMINOPHEN 650 MG: 325 TABLET, FILM COATED ORAL at 10:55

## 2023-01-01 RX ADMIN — MICONAZOLE NITRATE: 20 POWDER TOPICAL at 08:52

## 2023-01-01 RX ADMIN — ACETYLCYSTEINE 2 ML: 200 SOLUTION ORAL; RESPIRATORY (INHALATION) at 00:08

## 2023-01-01 RX ADMIN — WARFARIN SODIUM 4 MG: 4 TABLET ORAL at 17:57

## 2023-01-01 RX ADMIN — ACETYLCYSTEINE 2 ML: 200 SOLUTION ORAL; RESPIRATORY (INHALATION) at 07:45

## 2023-01-01 RX ADMIN — ASPIRIN 81 MG CHEWABLE TABLET 81 MG: 81 TABLET CHEWABLE at 08:03

## 2023-01-01 RX ADMIN — MIDODRINE HYDROCHLORIDE 10 MG: 5 TABLET ORAL at 15:45

## 2023-01-01 RX ADMIN — Medication 250 MG: at 09:26

## 2023-01-01 RX ADMIN — ALBUTEROL SULFATE 2.5 MG: 2.5 SOLUTION RESPIRATORY (INHALATION) at 13:00

## 2023-01-01 RX ADMIN — Medication 12.5 MG: at 08:28

## 2023-01-01 RX ADMIN — TORSEMIDE 40 MG: 20 TABLET ORAL at 08:07

## 2023-01-01 RX ADMIN — ACETAMINOPHEN 650 MG: 325 TABLET, FILM COATED ORAL at 21:18

## 2023-01-01 RX ADMIN — FUROSEMIDE 30 MG: 10 INJECTION, SOLUTION INTRAVENOUS at 10:12

## 2023-01-01 RX ADMIN — ACETYLCYSTEINE 2 ML: 200 SOLUTION ORAL; RESPIRATORY (INHALATION) at 19:33

## 2023-01-01 RX ADMIN — ASPIRIN 81 MG: 81 TABLET, CHEWABLE ORAL at 09:03

## 2023-01-01 RX ADMIN — NOREPINEPHRINE BITARTRATE 0.02 MCG/KG/MIN: 0.06 INJECTION, SOLUTION INTRAVENOUS at 19:36

## 2023-01-01 RX ADMIN — ALBUTEROL SULFATE 2.5 MG: 2.5 SOLUTION RESPIRATORY (INHALATION) at 07:27

## 2023-01-01 RX ADMIN — CHLORHEXIDINE GLUCONATE 15 ML: 1.2 RINSE ORAL at 07:56

## 2023-01-01 RX ADMIN — DEXMEDETOMIDINE HYDROCHLORIDE 0.2 MCG/KG/HR: 400 INJECTION INTRAVENOUS at 08:48

## 2023-01-01 RX ADMIN — ALBUTEROL SULFATE 2.5 MG: 2.5 SOLUTION RESPIRATORY (INHALATION) at 23:20

## 2023-01-01 RX ADMIN — Medication 5 MG: at 20:40

## 2023-01-01 RX ADMIN — ASPIRIN 81 MG CHEWABLE TABLET 81 MG: 81 TABLET CHEWABLE at 08:30

## 2023-01-01 RX ADMIN — MEROPENEM 1000 MG: 1 INJECTION, POWDER, FOR SOLUTION INTRAVENOUS at 19:54

## 2023-01-01 RX ADMIN — SODIUM CHLORIDE, POTASSIUM CHLORIDE, SODIUM LACTATE AND CALCIUM CHLORIDE 500 ML: 600; 310; 30; 20 INJECTION, SOLUTION INTRAVENOUS at 22:29

## 2023-01-01 RX ADMIN — SUGAMMADEX 200 MG: 100 INJECTION, SOLUTION INTRAVENOUS at 19:09

## 2023-01-01 RX ADMIN — Medication 250 MG: at 20:35

## 2023-01-01 RX ADMIN — ALBUTEROL SULFATE 2.5 MG: 2.5 SOLUTION RESPIRATORY (INHALATION) at 21:24

## 2023-01-01 RX ADMIN — GUAIFENESIN AND DEXTROMETHORPHAN 10 ML: 100; 10 SYRUP ORAL at 04:01

## 2023-01-01 RX ADMIN — Medication 250 MG: at 08:03

## 2023-01-01 RX ADMIN — VANCOMYCIN HYDROCHLORIDE 125 MG: 125 CAPSULE ORAL at 12:11

## 2023-01-01 RX ADMIN — MICONAZOLE NITRATE: 20 POWDER TOPICAL at 08:38

## 2023-01-01 RX ADMIN — MIRTAZAPINE 15 MG: 15 TABLET, FILM COATED ORAL at 20:58

## 2023-01-01 RX ADMIN — ALBUTEROL SULFATE 2.5 MG: 2.5 SOLUTION RESPIRATORY (INHALATION) at 16:19

## 2023-01-01 RX ADMIN — ACETAMINOPHEN 650 MG: 325 TABLET, FILM COATED ORAL at 04:13

## 2023-01-01 RX ADMIN — LOPERAMIDE HCL 2 MG: 1 SOLUTION ORAL at 12:36

## 2023-01-01 RX ADMIN — Medication 250 MG: at 08:19

## 2023-01-01 RX ADMIN — PIPERACILLIN AND TAZOBACTAM 3.38 G: 3; .375 INJECTION, POWDER, LYOPHILIZED, FOR SOLUTION INTRAVENOUS at 10:06

## 2023-01-01 RX ADMIN — VANCOMYCIN HYDROCHLORIDE 1000 MG: 1 INJECTION, SOLUTION INTRAVENOUS at 12:11

## 2023-01-01 RX ADMIN — POTASSIUM CHLORIDE 40 MEQ: 1.5 POWDER, FOR SOLUTION ORAL at 05:07

## 2023-01-01 RX ADMIN — PIPERACILLIN AND TAZOBACTAM 3.38 G: 3; .375 INJECTION, POWDER, LYOPHILIZED, FOR SOLUTION INTRAVENOUS at 01:10

## 2023-01-01 RX ADMIN — LOPERAMIDE HCL 2 MG: 1 SOLUTION ORAL at 14:32

## 2023-01-01 RX ADMIN — VANCOMYCIN HYDROCHLORIDE 125 MG: KIT at 19:35

## 2023-01-01 RX ADMIN — Medication 20 MG: at 17:39

## 2023-01-01 RX ADMIN — POTASSIUM CHLORIDE 20 MEQ: 29.8 INJECTION, SOLUTION INTRAVENOUS at 06:47

## 2023-01-01 RX ADMIN — ANORECTAL OINTMENT: 15.7; .44; 24; 20.6 OINTMENT TOPICAL at 08:43

## 2023-01-01 RX ADMIN — HYDROXYZINE HYDROCHLORIDE 25 MG: 25 TABLET ORAL at 08:02

## 2023-01-01 RX ADMIN — ACETAMINOPHEN 650 MG: 325 TABLET, FILM COATED ORAL at 06:46

## 2023-01-01 RX ADMIN — ALBUTEROL SULFATE 2.5 MG: 2.5 SOLUTION RESPIRATORY (INHALATION) at 19:42

## 2023-01-01 RX ADMIN — MIDODRINE HYDROCHLORIDE 10 MG: 5 TABLET ORAL at 08:45

## 2023-01-01 RX ADMIN — IPRATROPIUM BROMIDE AND ALBUTEROL SULFATE 3 ML: .5; 3 SOLUTION RESPIRATORY (INHALATION) at 19:44

## 2023-01-01 RX ADMIN — IPRATROPIUM BROMIDE AND ALBUTEROL SULFATE 3 ML: .5; 3 SOLUTION RESPIRATORY (INHALATION) at 20:27

## 2023-01-01 RX ADMIN — POTASSIUM CHLORIDE 20 MEQ: 29.8 INJECTION, SOLUTION INTRAVENOUS at 06:23

## 2023-01-01 RX ADMIN — FAMOTIDINE 20 MG: 20 TABLET ORAL at 11:49

## 2023-01-01 RX ADMIN — ACETAMINOPHEN 650 MG: 325 TABLET, FILM COATED ORAL at 08:50

## 2023-01-01 RX ADMIN — POTASSIUM CHLORIDE 20 MEQ: 1.5 POWDER, FOR SOLUTION ORAL at 08:01

## 2023-01-01 RX ADMIN — SENNOSIDES AND DOCUSATE SODIUM 1 TABLET: 8.6; 5 TABLET ORAL at 20:02

## 2023-01-01 RX ADMIN — MIRTAZAPINE 15 MG: 15 TABLET, FILM COATED ORAL at 20:53

## 2023-01-01 RX ADMIN — CHLORHEXIDINE GLUCONATE 0.12% ORAL RINSE 15 ML: 1.2 LIQUID ORAL at 08:25

## 2023-01-01 RX ADMIN — Medication 250 MG: at 20:04

## 2023-01-01 RX ADMIN — Medication 2 MG: at 08:24

## 2023-01-01 RX ADMIN — ACETYLCYSTEINE 2 ML: 200 SOLUTION ORAL; RESPIRATORY (INHALATION) at 08:13

## 2023-01-01 RX ADMIN — Medication 10 MG: at 11:13

## 2023-01-01 RX ADMIN — ESCITALOPRAM OXALATE 10 MG: 10 TABLET ORAL at 08:30

## 2023-01-01 RX ADMIN — LOPERAMIDE HYDROCHLORIDE 2 MG: 2 CAPSULE ORAL at 13:15

## 2023-01-01 RX ADMIN — Medication 2 MG: at 11:45

## 2023-01-01 RX ADMIN — ALBUTEROL SULFATE 2 PUFF: 90 AEROSOL, METERED RESPIRATORY (INHALATION) at 23:28

## 2023-01-01 RX ADMIN — POTASSIUM CHLORIDE 20 MEQ: 1.5 SOLUTION ORAL at 08:28

## 2023-01-01 RX ADMIN — PHENYLEPHRINE HYDROCHLORIDE 150 MCG: 10 INJECTION INTRAVENOUS at 15:00

## 2023-01-01 RX ADMIN — Medication 10 MG: at 09:26

## 2023-01-01 RX ADMIN — ALBUTEROL SULFATE 2.5 MG: 2.5 SOLUTION RESPIRATORY (INHALATION) at 17:00

## 2023-01-01 RX ADMIN — TORSEMIDE 40 MG: 20 TABLET ORAL at 09:25

## 2023-01-01 RX ADMIN — INSULIN ASPART 1 UNITS: 100 INJECTION, SOLUTION INTRAVENOUS; SUBCUTANEOUS at 08:01

## 2023-01-01 RX ADMIN — Medication 250 MG: at 08:50

## 2023-01-01 RX ADMIN — KETAMINE HYDROCHLORIDE 10 MG: 10 INJECTION INTRAMUSCULAR; INTRAVENOUS at 09:06

## 2023-01-01 RX ADMIN — ALBUTEROL SULFATE 2.5 MG: 2.5 SOLUTION RESPIRATORY (INHALATION) at 20:28

## 2023-01-01 RX ADMIN — HYDROXYZINE HYDROCHLORIDE 25 MG: 25 TABLET ORAL at 21:57

## 2023-01-01 RX ADMIN — HEPARIN SODIUM 5000 UNITS: 5000 INJECTION, SOLUTION INTRAVENOUS; SUBCUTANEOUS at 05:30

## 2023-01-01 RX ADMIN — PROCHLORPERAZINE EDISYLATE 5 MG: 5 INJECTION INTRAMUSCULAR; INTRAVENOUS at 13:35

## 2023-01-01 RX ADMIN — Medication 15 ML: at 08:00

## 2023-01-01 RX ADMIN — POTASSIUM CHLORIDE 10 MEQ: 7.46 INJECTION, SOLUTION INTRAVENOUS at 06:55

## 2023-01-01 RX ADMIN — ALBUTEROL SULFATE 2.5 MG: 2.5 SOLUTION RESPIRATORY (INHALATION) at 03:25

## 2023-01-01 RX ADMIN — LOPERAMIDE HCL 2 MG: 1 SOLUTION ORAL at 21:11

## 2023-01-01 RX ADMIN — ALBUTEROL SULFATE 2.5 MG: 2.5 SOLUTION RESPIRATORY (INHALATION) at 16:13

## 2023-01-01 RX ADMIN — MEROPENEM 1000 MG: 1 INJECTION, POWDER, FOR SOLUTION INTRAVENOUS at 19:31

## 2023-01-01 RX ADMIN — ALBUTEROL SULFATE 2.5 MG: 2.5 SOLUTION RESPIRATORY (INHALATION) at 00:26

## 2023-01-01 RX ADMIN — ACETYLCYSTEINE 2 ML: 200 SOLUTION ORAL; RESPIRATORY (INHALATION) at 00:26

## 2023-01-01 RX ADMIN — Medication 50 MG: at 15:01

## 2023-01-01 RX ADMIN — ACETYLCYSTEINE 2 ML: 200 SOLUTION ORAL; RESPIRATORY (INHALATION) at 20:08

## 2023-01-01 RX ADMIN — MULTIVIT AND MINERALS-FERROUS GLUCONATE 9 MG IRON/15 ML ORAL LIQUID 15 ML: at 08:21

## 2023-01-01 RX ADMIN — Medication 250 MG: at 12:12

## 2023-01-01 RX ADMIN — LIDOCAINE HYDROCHLORIDE 60 MG: 10 INJECTION, SOLUTION INFILTRATION; PERINEURAL at 12:21

## 2023-01-01 RX ADMIN — VANCOMYCIN HYDROCHLORIDE 125 MG: KIT at 12:28

## 2023-01-01 RX ADMIN — POTASSIUM CHLORIDE 20 MEQ: 29.8 INJECTION, SOLUTION INTRAVENOUS at 09:01

## 2023-01-01 RX ADMIN — IPRATROPIUM BROMIDE AND ALBUTEROL SULFATE 3 ML: .5; 3 SOLUTION RESPIRATORY (INHALATION) at 12:29

## 2023-01-01 RX ADMIN — MULTIVIT AND MINERALS-FERROUS GLUCONATE 9 MG IRON/15 ML ORAL LIQUID 15 ML: at 08:29

## 2023-01-01 RX ADMIN — LOPERAMIDE HCL 2 MG: 1 SOLUTION ORAL at 16:54

## 2023-01-01 RX ADMIN — ALBUTEROL SULFATE 2.5 MG: 2.5 SOLUTION RESPIRATORY (INHALATION) at 11:31

## 2023-01-01 RX ADMIN — GUAIFENESIN AND DEXTROMETHORPHAN 10 ML: 100; 10 SYRUP ORAL at 11:04

## 2023-01-01 RX ADMIN — CHLORHEXIDINE GLUCONATE 15 ML: 1.2 RINSE ORAL at 19:52

## 2023-01-01 RX ADMIN — LIDOCAINE HYDROCHLORIDE 100 MG: 20 INJECTION, SOLUTION INFILTRATION; PERINEURAL at 13:40

## 2023-01-01 RX ADMIN — HYDROCORTISONE SODIUM SUCCINATE 50 MG: 100 INJECTION, POWDER, FOR SOLUTION INTRAMUSCULAR; INTRAVENOUS at 13:37

## 2023-01-01 RX ADMIN — BUMETANIDE 1 MG: 1 TABLET ORAL at 15:38

## 2023-01-01 RX ADMIN — ONDANSETRON 4 MG: 2 INJECTION INTRAMUSCULAR; INTRAVENOUS at 15:53

## 2023-01-01 RX ADMIN — EMPAGLIFLOZIN 10 MG: 10 TABLET, FILM COATED ORAL at 09:01

## 2023-01-01 RX ADMIN — HYDROXYZINE HYDROCHLORIDE 25 MG: 25 TABLET ORAL at 08:27

## 2023-01-01 RX ADMIN — PIPERACILLIN AND TAZOBACTAM 3.38 G: 3; .375 INJECTION, POWDER, LYOPHILIZED, FOR SOLUTION INTRAVENOUS at 15:19

## 2023-01-01 RX ADMIN — SODIUM CHLORIDE, SODIUM GLUCONATE, SODIUM ACETATE, POTASSIUM CHLORIDE AND MAGNESIUM CHLORIDE: 526; 502; 368; 37; 30 INJECTION, SOLUTION INTRAVENOUS at 13:30

## 2023-01-01 RX ADMIN — MIRTAZAPINE 15 MG: 15 TABLET, FILM COATED ORAL at 20:15

## 2023-01-01 RX ADMIN — MIRTAZAPINE 15 MG: 15 TABLET, FILM COATED ORAL at 21:10

## 2023-01-01 RX ADMIN — ASPIRIN 81 MG CHEWABLE TABLET 81 MG: 81 TABLET CHEWABLE at 12:12

## 2023-01-01 RX ADMIN — SPIRONOLACTONE 25 MG: 25 TABLET ORAL at 13:26

## 2023-01-01 RX ADMIN — ACETYLCYSTEINE 2 ML: 200 SOLUTION ORAL; RESPIRATORY (INHALATION) at 03:27

## 2023-01-01 RX ADMIN — OXYCODONE HYDROCHLORIDE 2.5 MG: 5 TABLET ORAL at 00:54

## 2023-01-01 RX ADMIN — POTASSIUM CHLORIDE 20 MEQ: 29.8 INJECTION, SOLUTION INTRAVENOUS at 05:56

## 2023-01-01 RX ADMIN — ESCITALOPRAM 10 MG: 5 SOLUTION ORAL at 08:15

## 2023-01-01 RX ADMIN — METOPROLOL TARTRATE 25 MG: 25 TABLET, FILM COATED ORAL at 20:49

## 2023-01-01 RX ADMIN — POTASSIUM CHLORIDE 20 MEQ: 1.5 SOLUTION ORAL at 20:05

## 2023-01-01 RX ADMIN — VANCOMYCIN HYDROCHLORIDE 125 MG: KIT at 08:31

## 2023-01-01 RX ADMIN — ACETYLCYSTEINE 2 ML: 200 SOLUTION ORAL; RESPIRATORY (INHALATION) at 19:13

## 2023-01-01 RX ADMIN — HEPARIN SODIUM 5000 UNITS: 5000 INJECTION, SOLUTION INTRAVENOUS; SUBCUTANEOUS at 14:01

## 2023-01-01 RX ADMIN — PANTOPRAZOLE SODIUM 40 MG: 40 INJECTION, POWDER, FOR SOLUTION INTRAVENOUS at 07:48

## 2023-01-01 RX ADMIN — Medication 250 MG: at 21:51

## 2023-01-01 RX ADMIN — CHLORHEXIDINE GLUCONATE 0.12% ORAL RINSE 15 ML: 1.2 LIQUID ORAL at 08:14

## 2023-01-01 RX ADMIN — CHLORHEXIDINE GLUCONATE 0.12% ORAL RINSE 15 ML: 1.2 LIQUID ORAL at 08:45

## 2023-01-01 RX ADMIN — Medication 0.03 MCG/KG/MIN: at 14:13

## 2023-01-01 RX ADMIN — Medication 2 MG: at 08:30

## 2023-01-01 RX ADMIN — ACETYLCYSTEINE 2 ML: 200 SOLUTION ORAL; RESPIRATORY (INHALATION) at 19:16

## 2023-01-01 RX ADMIN — FAMOTIDINE 20 MG: 20 TABLET ORAL at 08:50

## 2023-01-01 RX ADMIN — CHLOROTHIAZIDE SODIUM 500 MG: 500 INJECTION, POWDER, LYOPHILIZED, FOR SOLUTION INTRAVENOUS at 15:13

## 2023-01-01 RX ADMIN — HEPARIN SODIUM 25000 UNITS: 1000 INJECTION INTRAVENOUS; SUBCUTANEOUS at 15:33

## 2023-01-01 RX ADMIN — DIPHENHYDRAMINE HCL 25 MG: 25 TABLET, COATED ORAL at 12:36

## 2023-01-01 RX ADMIN — LOPERAMIDE HCL 2 MG: 1 SOLUTION ORAL at 08:24

## 2023-01-01 RX ADMIN — BUMETANIDE 1 MG: 1 TABLET ORAL at 14:09

## 2023-01-01 RX ADMIN — ALBUTEROL SULFATE 2.5 MG: 2.5 SOLUTION RESPIRATORY (INHALATION) at 11:24

## 2023-01-01 RX ADMIN — TORSEMIDE 20 MG: 20 TABLET ORAL at 08:28

## 2023-01-01 RX ADMIN — HYDROXYZINE HYDROCHLORIDE 25 MG: 25 TABLET ORAL at 02:55

## 2023-01-01 RX ADMIN — EMPAGLIFLOZIN 10 MG: 10 TABLET, FILM COATED ORAL at 17:39

## 2023-01-01 RX ADMIN — POTASSIUM CHLORIDE 40 MEQ: 1.5 SOLUTION ORAL at 13:24

## 2023-01-01 RX ADMIN — MIRTAZAPINE 15 MG: 15 TABLET, FILM COATED ORAL at 20:46

## 2023-01-01 RX ADMIN — VANCOMYCIN HYDROCHLORIDE 125 MG: 125 CAPSULE ORAL at 08:13

## 2023-01-01 RX ADMIN — SODIUM CHLORIDE 80 ML: 9 INJECTION, SOLUTION INTRAVENOUS at 11:57

## 2023-01-01 RX ADMIN — Medication 12.5 MG: at 20:04

## 2023-01-01 RX ADMIN — Medication 10 MG: at 10:59

## 2023-01-01 RX ADMIN — DEXAMETHASONE SODIUM PHOSPHATE 40 MG: 10 INJECTION INTRAMUSCULAR; INTRAVENOUS at 12:01

## 2023-01-01 RX ADMIN — INSULIN ASPART 1 UNITS: 100 INJECTION, SOLUTION INTRAVENOUS; SUBCUTANEOUS at 00:00

## 2023-01-01 RX ADMIN — Medication 15 ML: at 07:56

## 2023-01-01 RX ADMIN — ONDANSETRON 4 MG: 2 INJECTION INTRAMUSCULAR; INTRAVENOUS at 08:36

## 2023-01-01 RX ADMIN — Medication 5 MG: at 20:44

## 2023-01-01 RX ADMIN — PIPERACILLIN AND TAZOBACTAM 3.38 G: 3; .375 INJECTION, POWDER, LYOPHILIZED, FOR SOLUTION INTRAVENOUS at 08:24

## 2023-01-01 RX ADMIN — CHLORHEXIDINE GLUCONATE 0.12% ORAL RINSE 15 ML: 1.2 LIQUID ORAL at 09:03

## 2023-01-01 RX ADMIN — ALBUTEROL SULFATE 2.5 MG: 2.5 SOLUTION RESPIRATORY (INHALATION) at 15:41

## 2023-01-01 RX ADMIN — MEROPENEM 500 MG: 500 INJECTION, POWDER, FOR SOLUTION INTRAVENOUS at 20:09

## 2023-01-01 RX ADMIN — PHENYLEPHRINE HYDROCHLORIDE 50 MCG: 10 INJECTION INTRAVENOUS at 15:40

## 2023-01-01 RX ADMIN — ACETYLCYSTEINE 2 ML: 200 SOLUTION ORAL; RESPIRATORY (INHALATION) at 04:30

## 2023-01-01 RX ADMIN — BUMETANIDE 2 MG: 0.25 INJECTION INTRAMUSCULAR; INTRAVENOUS at 11:45

## 2023-01-01 RX ADMIN — Medication 2 MG: at 13:12

## 2023-01-01 RX ADMIN — ACETAMINOPHEN 650 MG: 325 TABLET, FILM COATED ORAL at 00:14

## 2023-01-01 RX ADMIN — IPRATROPIUM BROMIDE AND ALBUTEROL SULFATE 3 ML: .5; 3 SOLUTION RESPIRATORY (INHALATION) at 07:32

## 2023-01-01 RX ADMIN — ASPIRIN 81 MG: 81 TABLET, CHEWABLE ORAL at 09:00

## 2023-01-01 RX ADMIN — Medication 5 MG: at 20:46

## 2023-01-01 RX ADMIN — HYDROXYZINE HYDROCHLORIDE 25 MG: 25 TABLET ORAL at 00:26

## 2023-01-01 RX ADMIN — INSULIN ASPART 1 UNITS: 100 INJECTION, SOLUTION INTRAVENOUS; SUBCUTANEOUS at 04:31

## 2023-01-01 RX ADMIN — INSULIN ASPART 1 UNITS: 100 INJECTION, SOLUTION INTRAVENOUS; SUBCUTANEOUS at 15:59

## 2023-01-01 RX ADMIN — MICONAZOLE NITRATE: 20 POWDER TOPICAL at 20:51

## 2023-01-01 RX ADMIN — ACETAMINOPHEN 650 MG: 325 TABLET, FILM COATED ORAL at 16:24

## 2023-01-01 RX ADMIN — WARFARIN SODIUM 1 MG: 1 TABLET ORAL at 18:34

## 2023-01-01 RX ADMIN — MICONAZOLE NITRATE: 20 POWDER TOPICAL at 08:28

## 2023-01-01 RX ADMIN — OXYCODONE HYDROCHLORIDE AND ACETAMINOPHEN 1 TABLET: 5; 325 TABLET ORAL at 05:37

## 2023-01-01 RX ADMIN — ALBUTEROL SULFATE 2.5 MG: 2.5 SOLUTION RESPIRATORY (INHALATION) at 20:25

## 2023-01-01 RX ADMIN — Medication 250 MG: at 20:31

## 2023-01-01 RX ADMIN — LOPERAMIDE HCL 2 MG: 1 SOLUTION ORAL at 00:17

## 2023-01-01 RX ADMIN — ESCITALOPRAM 10 MG: 5 SOLUTION ORAL at 08:29

## 2023-01-01 RX ADMIN — GUAIFENESIN AND DEXTROMETHORPHAN 10 ML: 100; 10 SYRUP ORAL at 15:54

## 2023-01-01 RX ADMIN — Medication 250 MG: at 08:15

## 2023-01-01 RX ADMIN — ALBUTEROL SULFATE 2.5 MG: 2.5 SOLUTION RESPIRATORY (INHALATION) at 11:29

## 2023-01-01 RX ADMIN — HYDROCORTISONE SODIUM SUCCINATE 50 MG: 100 INJECTION, POWDER, FOR SOLUTION INTRAMUSCULAR; INTRAVENOUS at 21:37

## 2023-01-01 RX ADMIN — ALBUTEROL SULFATE 2.5 MG: 2.5 SOLUTION RESPIRATORY (INHALATION) at 20:27

## 2023-01-01 RX ADMIN — Medication 12.5 MG: at 08:19

## 2023-01-01 RX ADMIN — VANCOMYCIN HYDROCHLORIDE 125 MG: KIT at 20:37

## 2023-01-01 RX ADMIN — ACETAMINOPHEN 650 MG: 650 SUPPOSITORY RECTAL at 18:20

## 2023-01-01 RX ADMIN — PIPERACILLIN AND TAZOBACTAM 3.38 G: 3; .375 INJECTION, POWDER, LYOPHILIZED, FOR SOLUTION INTRAVENOUS at 03:12

## 2023-01-01 RX ADMIN — Medication 250 MG: at 19:33

## 2023-01-01 RX ADMIN — MIDODRINE HYDROCHLORIDE 10 MG: 5 TABLET ORAL at 13:38

## 2023-01-01 RX ADMIN — ACETAMINOPHEN 650 MG: 650 SOLUTION ORAL at 21:12

## 2023-01-01 RX ADMIN — PIPERACILLIN AND TAZOBACTAM 3.38 G: 3; .375 INJECTION, POWDER, LYOPHILIZED, FOR SOLUTION INTRAVENOUS at 02:18

## 2023-01-01 RX ADMIN — LOPERAMIDE HCL 2 MG: 1 SOLUTION ORAL at 04:34

## 2023-01-01 RX ADMIN — POTASSIUM CHLORIDE 20 MEQ: 29.8 INJECTION, SOLUTION INTRAVENOUS at 05:16

## 2023-01-01 RX ADMIN — GUAIFENESIN AND DEXTROMETHORPHAN 10 ML: 100; 10 SYRUP ORAL at 16:22

## 2023-01-01 RX ADMIN — Medication 250 MG: at 08:01

## 2023-01-01 RX ADMIN — ACETAMINOPHEN 650 MG: 325 TABLET, FILM COATED ORAL at 03:51

## 2023-01-01 RX ADMIN — BUMETANIDE 3 MG: 0.25 INJECTION INTRAMUSCULAR; INTRAVENOUS at 08:00

## 2023-01-01 RX ADMIN — BUMETANIDE 4 MG: 0.25 INJECTION INTRAMUSCULAR; INTRAVENOUS at 08:47

## 2023-01-01 RX ADMIN — POTASSIUM CHLORIDE 20 MEQ: 1.5 POWDER, FOR SOLUTION ORAL at 13:38

## 2023-01-01 RX ADMIN — PIPERACILLIN SODIUM AND TAZOBACTAM SODIUM 2.25 G: 2; .25 INJECTION, POWDER, LYOPHILIZED, FOR SOLUTION INTRAVENOUS at 18:06

## 2023-01-01 RX ADMIN — INSULIN ASPART 1 UNITS: 100 INJECTION, SOLUTION INTRAVENOUS; SUBCUTANEOUS at 11:38

## 2023-01-01 RX ADMIN — IPRATROPIUM BROMIDE AND ALBUTEROL SULFATE 3 ML: .5; 3 SOLUTION RESPIRATORY (INHALATION) at 11:42

## 2023-01-01 RX ADMIN — ACETYLCYSTEINE 2 ML: 200 SOLUTION ORAL; RESPIRATORY (INHALATION) at 00:00

## 2023-01-01 RX ADMIN — ALBUTEROL SULFATE 2.5 MG: 2.5 SOLUTION RESPIRATORY (INHALATION) at 07:34

## 2023-01-01 RX ADMIN — PIPERACILLIN AND TAZOBACTAM 3.38 G: 3; .375 INJECTION, POWDER, LYOPHILIZED, FOR SOLUTION INTRAVENOUS at 01:52

## 2023-01-01 RX ADMIN — MICONAZOLE NITRATE: 20 POWDER TOPICAL at 08:43

## 2023-01-01 RX ADMIN — ALBUTEROL SULFATE 2.5 MG: 2.5 SOLUTION RESPIRATORY (INHALATION) at 00:11

## 2023-01-01 RX ADMIN — PROTAMINE SULFATE 20 MG: 10 INJECTION, SOLUTION INTRAVENOUS at 18:04

## 2023-01-01 RX ADMIN — AMINOCAPROIC ACID 1 G/HR: 250 INJECTION, SOLUTION INTRAVENOUS at 15:00

## 2023-01-01 RX ADMIN — ALBUTEROL SULFATE 2.5 MG: 2.5 SOLUTION RESPIRATORY (INHALATION) at 13:32

## 2023-01-01 RX ADMIN — SODIUM CHLORIDE, POTASSIUM CHLORIDE, SODIUM LACTATE AND CALCIUM CHLORIDE 1000 ML: 600; 310; 30; 20 INJECTION, SOLUTION INTRAVENOUS at 23:12

## 2023-01-01 RX ADMIN — BUMETANIDE 4 MG: 0.25 INJECTION INTRAMUSCULAR; INTRAVENOUS at 20:09

## 2023-01-01 RX ADMIN — KETAMINE HYDROCHLORIDE 20 MG: 10 INJECTION INTRAMUSCULAR; INTRAVENOUS at 09:01

## 2023-01-01 RX ADMIN — ASPIRIN 81 MG: 81 TABLET, CHEWABLE ORAL at 09:26

## 2023-01-01 RX ADMIN — METOPROLOL TARTRATE 25 MG: 25 TABLET, FILM COATED ORAL at 08:13

## 2023-01-01 RX ADMIN — ACETYLCYSTEINE 2 ML: 200 SOLUTION ORAL; RESPIRATORY (INHALATION) at 16:13

## 2023-01-01 RX ADMIN — Medication 100 MG: at 13:41

## 2023-01-01 RX ADMIN — PHENYLEPHRINE HYDROCHLORIDE 100 MCG: 10 INJECTION INTRAVENOUS at 08:17

## 2023-01-01 RX ADMIN — HEPARIN SODIUM 5000 UNITS: 5000 INJECTION, SOLUTION INTRAVENOUS; SUBCUTANEOUS at 11:48

## 2023-01-01 RX ADMIN — VANCOMYCIN HYDROCHLORIDE 125 MG: KIT at 20:16

## 2023-01-01 RX ADMIN — HYDROXYZINE HYDROCHLORIDE 25 MG: 25 TABLET ORAL at 08:28

## 2023-01-01 RX ADMIN — CHLORHEXIDINE GLUCONATE 0.12% ORAL RINSE 15 ML: 1.2 LIQUID ORAL at 08:51

## 2023-01-01 RX ADMIN — ALBUTEROL SULFATE 2 PUFF: 90 AEROSOL, METERED RESPIRATORY (INHALATION) at 03:40

## 2023-01-01 RX ADMIN — MIRTAZAPINE 15 MG: 15 TABLET, FILM COATED ORAL at 20:59

## 2023-01-01 RX ADMIN — ACETAMINOPHEN 650 MG: 325 TABLET, FILM COATED ORAL at 18:48

## 2023-01-01 RX ADMIN — MEROPENEM 1000 MG: 1 INJECTION, POWDER, FOR SOLUTION INTRAVENOUS at 08:35

## 2023-01-01 RX ADMIN — Medication 5 MG: at 22:24

## 2023-01-01 RX ADMIN — HEPARIN SODIUM 5000 UNITS: 5000 INJECTION, SOLUTION INTRAVENOUS; SUBCUTANEOUS at 03:12

## 2023-01-01 RX ADMIN — ALBUTEROL SULFATE 2.5 MG: 2.5 SOLUTION RESPIRATORY (INHALATION) at 21:08

## 2023-01-01 RX ADMIN — METOPROLOL TARTRATE 25 MG: 25 TABLET, FILM COATED ORAL at 07:57

## 2023-01-01 RX ADMIN — HYDROCORTISONE SODIUM SUCCINATE 50 MG: 100 INJECTION, POWDER, FOR SOLUTION INTRAMUSCULAR; INTRAVENOUS at 00:47

## 2023-01-01 RX ADMIN — ALBUTEROL SULFATE 2.5 MG: 2.5 SOLUTION RESPIRATORY (INHALATION) at 23:15

## 2023-01-01 RX ADMIN — Medication 250 MG: at 20:52

## 2023-01-01 RX ADMIN — ALBUTEROL SULFATE 2.5 MG: 2.5 SOLUTION RESPIRATORY (INHALATION) at 13:20

## 2023-01-01 RX ADMIN — GUAIFENESIN AND DEXTROMETHORPHAN 10 ML: 100; 10 SYRUP ORAL at 20:53

## 2023-01-01 RX ADMIN — HYDROCORTISONE SODIUM SUCCINATE 50 MG: 100 INJECTION, POWDER, FOR SOLUTION INTRAMUSCULAR; INTRAVENOUS at 21:08

## 2023-01-01 RX ADMIN — HEPARIN SODIUM 5000 UNITS: 5000 INJECTION, SOLUTION INTRAVENOUS; SUBCUTANEOUS at 04:31

## 2023-01-01 RX ADMIN — BUMETANIDE 2 MG: 0.25 INJECTION INTRAMUSCULAR; INTRAVENOUS at 08:40

## 2023-01-01 RX ADMIN — HYDROCORTISONE SODIUM SUCCINATE 50 MG: 100 INJECTION, POWDER, FOR SOLUTION INTRAMUSCULAR; INTRAVENOUS at 00:34

## 2023-01-01 RX ADMIN — Medication 250 MG: at 09:00

## 2023-01-01 RX ADMIN — SODIUM PHOSPHATE, MONOBASIC, MONOHYDRATE AND SODIUM PHOSPHATE, DIBASIC, ANHYDROUS 9 MMOL: 142; 276 INJECTION, SOLUTION INTRAVENOUS at 10:31

## 2023-01-01 RX ADMIN — ACETYLCYSTEINE 2 ML: 200 SOLUTION ORAL; RESPIRATORY (INHALATION) at 23:20

## 2023-01-01 RX ADMIN — CHLOROTHIAZIDE SODIUM 500 MG: 500 INJECTION, POWDER, LYOPHILIZED, FOR SOLUTION INTRAVENOUS at 12:58

## 2023-01-01 RX ADMIN — BUMETANIDE 1 MG: 1 TABLET ORAL at 13:29

## 2023-01-01 RX ADMIN — ESCITALOPRAM 10 MG: 5 SOLUTION ORAL at 08:28

## 2023-01-01 RX ADMIN — PROPOFOL 100 MCG/KG/MIN: 10 INJECTION, EMULSION INTRAVENOUS at 12:20

## 2023-01-01 RX ADMIN — ASPIRIN 81 MG CHEWABLE TABLET 81 MG: 81 TABLET CHEWABLE at 08:19

## 2023-01-01 RX ADMIN — BUMETANIDE 2 MG: 0.25 INJECTION INTRAMUSCULAR; INTRAVENOUS at 20:12

## 2023-01-01 RX ADMIN — POTASSIUM CHLORIDE 20 MEQ: 1.5 POWDER, FOR SOLUTION ORAL at 20:52

## 2023-01-01 RX ADMIN — ANORECTAL OINTMENT: 15.7; .44; 24; 20.6 OINTMENT TOPICAL at 08:25

## 2023-01-01 RX ADMIN — SODIUM BICARBONATE 325 MG: 650 TABLET ORAL at 02:54

## 2023-01-01 RX ADMIN — VANCOMYCIN HYDROCHLORIDE 125 MG: KIT at 20:10

## 2023-01-01 RX ADMIN — ALBUTEROL SULFATE 2.5 MG: 2.5 SOLUTION RESPIRATORY (INHALATION) at 07:38

## 2023-01-01 RX ADMIN — PIPERACILLIN AND TAZOBACTAM 3.38 G: 3; .375 INJECTION, POWDER, LYOPHILIZED, FOR SOLUTION INTRAVENOUS at 20:59

## 2023-01-01 RX ADMIN — MICONAZOLE NITRATE: 20 POWDER TOPICAL at 08:26

## 2023-01-01 RX ADMIN — BUMETANIDE 1.5 MG: 0.25 INJECTION INTRAMUSCULAR; INTRAVENOUS at 11:57

## 2023-01-01 RX ADMIN — Medication 500 MG: at 15:24

## 2023-01-01 RX ADMIN — MEROPENEM 1000 MG: 1 INJECTION, POWDER, FOR SOLUTION INTRAVENOUS at 08:33

## 2023-01-01 RX ADMIN — PROPOFOL 40 MCG/KG/MIN: 10 INJECTION, EMULSION INTRAVENOUS at 09:00

## 2023-01-01 RX ADMIN — ALBUTEROL SULFATE 2.5 MG: 2.5 SOLUTION RESPIRATORY (INHALATION) at 07:35

## 2023-01-01 RX ADMIN — POTASSIUM CHLORIDE 20 MEQ: 29.8 INJECTION, SOLUTION INTRAVENOUS at 08:36

## 2023-01-01 RX ADMIN — Medication 250 MG: at 08:48

## 2023-01-01 RX ADMIN — IPRATROPIUM BROMIDE AND ALBUTEROL SULFATE 3 ML: .5; 3 SOLUTION RESPIRATORY (INHALATION) at 15:53

## 2023-01-01 RX ADMIN — Medication 250 MG: at 09:03

## 2023-01-01 RX ADMIN — ESCITALOPRAM 10 MG: 5 SOLUTION ORAL at 09:04

## 2023-01-01 RX ADMIN — FAMOTIDINE 20 MG: 20 TABLET ORAL at 08:15

## 2023-01-01 RX ADMIN — ACETYLCYSTEINE 2 ML: 200 SOLUTION ORAL; RESPIRATORY (INHALATION) at 20:28

## 2023-01-01 RX ADMIN — ASPIRIN 81 MG CHEWABLE TABLET 81 MG: 81 TABLET CHEWABLE at 08:31

## 2023-01-01 RX ADMIN — Medication 250 MG: at 08:34

## 2023-01-01 RX ADMIN — ACETAMINOPHEN 650 MG: 325 TABLET, FILM COATED ORAL at 15:35

## 2023-01-01 RX ADMIN — POTASSIUM CHLORIDE 20 MEQ: 1.5 SOLUTION ORAL at 21:10

## 2023-01-01 RX ADMIN — DIPHENHYDRAMINE HCL 25 MG: 25 TABLET, COATED ORAL at 21:57

## 2023-01-01 RX ADMIN — WARFARIN SODIUM 2 MG: 2 TABLET ORAL at 17:54

## 2023-01-01 RX ADMIN — MIRTAZAPINE 15 MG: 15 TABLET, FILM COATED ORAL at 20:12

## 2023-01-01 RX ADMIN — WARFARIN SODIUM 2 MG: 2 TABLET ORAL at 17:07

## 2023-01-01 RX ADMIN — Medication 250 MG: at 19:54

## 2023-01-01 RX ADMIN — GUAIFENESIN AND DEXTROMETHORPHAN 10 ML: 100; 10 SYRUP ORAL at 20:10

## 2023-01-01 RX ADMIN — ALBUTEROL SULFATE 2.5 MG: 2.5 SOLUTION RESPIRATORY (INHALATION) at 07:25

## 2023-01-01 RX ADMIN — ALBUTEROL SULFATE 2.5 MG: 2.5 SOLUTION RESPIRATORY (INHALATION) at 00:42

## 2023-01-01 RX ADMIN — IPRATROPIUM BROMIDE AND ALBUTEROL SULFATE 3 ML: .5; 3 SOLUTION RESPIRATORY (INHALATION) at 08:10

## 2023-01-01 RX ADMIN — SIMETHICONE 40 MG: 20 EMULSION ORAL at 01:08

## 2023-01-01 RX ADMIN — ALBUTEROL SULFATE 2.5 MG: 2.5 SOLUTION RESPIRATORY (INHALATION) at 16:54

## 2023-01-01 RX ADMIN — CHLOROTHIAZIDE SODIUM 500 MG: 500 INJECTION, POWDER, LYOPHILIZED, FOR SOLUTION INTRAVENOUS at 09:48

## 2023-01-01 RX ADMIN — SIMETHICONE 40 MG: 20 EMULSION ORAL at 05:48

## 2023-01-01 RX ADMIN — ACETAMINOPHEN 650 MG: 650 SOLUTION ORAL at 20:35

## 2023-01-01 RX ADMIN — SIMETHICONE 40 MG: 20 EMULSION ORAL at 20:21

## 2023-01-01 RX ADMIN — MIRTAZAPINE 15 MG: 15 TABLET, FILM COATED ORAL at 20:16

## 2023-01-01 RX ADMIN — HYDROXYZINE HYDROCHLORIDE 25 MG: 25 TABLET ORAL at 05:20

## 2023-01-01 RX ADMIN — ESCITALOPRAM OXALATE 10 MG: 10 TABLET ORAL at 12:13

## 2023-01-01 RX ADMIN — ROCURONIUM BROMIDE 50 MG: 50 INJECTION, SOLUTION INTRAVENOUS at 18:45

## 2023-01-01 RX ADMIN — WARFARIN SODIUM 3 MG: 3 TABLET ORAL at 17:14

## 2023-01-01 RX ADMIN — FUROSEMIDE 40 MG: 10 INJECTION, SOLUTION INTRAVENOUS at 08:32

## 2023-01-01 RX ADMIN — PANTOPRAZOLE SODIUM 40 MG: 40 TABLET, DELAYED RELEASE ORAL at 12:13

## 2023-01-01 RX ADMIN — MEROPENEM 500 MG: 500 INJECTION, POWDER, FOR SOLUTION INTRAVENOUS at 07:50

## 2023-01-01 RX ADMIN — Medication 250 MG: at 07:48

## 2023-01-01 RX ADMIN — HEPARIN SODIUM 5000 UNITS: 10000 INJECTION, SOLUTION INTRAVENOUS; SUBCUTANEOUS at 21:10

## 2023-01-01 RX ADMIN — LABETALOL HYDROCHLORIDE 15 MG: 5 INJECTION, SOLUTION INTRAVENOUS at 00:41

## 2023-01-01 RX ADMIN — OXYCODONE HYDROCHLORIDE 2.5 MG: 5 TABLET ORAL at 17:11

## 2023-01-01 RX ADMIN — ACETYLCYSTEINE 2 ML: 200 SOLUTION ORAL; RESPIRATORY (INHALATION) at 12:13

## 2023-01-01 RX ADMIN — SIMETHICONE 40 MG: 20 EMULSION ORAL at 14:42

## 2023-01-01 RX ADMIN — ALBUTEROL SULFATE 2.5 MG: 2.5 SOLUTION RESPIRATORY (INHALATION) at 15:43

## 2023-01-01 RX ADMIN — GADOBUTROL 10 ML: 604.72 INJECTION INTRAVENOUS at 16:45

## 2023-01-01 RX ADMIN — FAMOTIDINE 20 MG: 20 TABLET ORAL at 07:32

## 2023-01-01 RX ADMIN — IPRATROPIUM BROMIDE AND ALBUTEROL SULFATE 3 ML: .5; 3 SOLUTION RESPIRATORY (INHALATION) at 11:33

## 2023-01-01 RX ADMIN — FAMOTIDINE 20 MG: 20 TABLET ORAL at 08:30

## 2023-01-01 RX ADMIN — ACETYLCYSTEINE 2 ML: 200 SOLUTION ORAL; RESPIRATORY (INHALATION) at 19:41

## 2023-01-01 RX ADMIN — MIRTAZAPINE 15 MG: 15 TABLET, FILM COATED ORAL at 21:08

## 2023-01-01 RX ADMIN — CHLORHEXIDINE GLUCONATE 0.12% ORAL RINSE 15 ML: 1.2 LIQUID ORAL at 21:31

## 2023-01-01 RX ADMIN — GUAIFENESIN AND DEXTROMETHORPHAN 10 ML: 100; 10 SYRUP ORAL at 00:38

## 2023-01-01 RX ADMIN — CARBOXYMETHYLCELLULOSE SODIUM 1 DROP: 5 SOLUTION/ DROPS OPHTHALMIC at 10:55

## 2023-01-01 RX ADMIN — POTASSIUM CHLORIDE 20 MEQ: 750 TABLET, EXTENDED RELEASE ORAL at 08:39

## 2023-01-01 RX ADMIN — SIMETHICONE 40 MG: 20 EMULSION ORAL at 04:15

## 2023-01-01 RX ADMIN — ESCITALOPRAM 10 MG: 5 SOLUTION ORAL at 08:35

## 2023-01-01 RX ADMIN — METOPROLOL TARTRATE 25 MG: 25 TABLET, FILM COATED ORAL at 20:01

## 2023-01-01 RX ADMIN — MIRTAZAPINE 15 MG: 15 TABLET, FILM COATED ORAL at 21:37

## 2023-01-01 RX ADMIN — CHLOROTHIAZIDE SODIUM 500 MG: 500 INJECTION, POWDER, LYOPHILIZED, FOR SOLUTION INTRAVENOUS at 11:48

## 2023-01-01 RX ADMIN — ACETYLCYSTEINE 4 ML: 100 SOLUTION ORAL; RESPIRATORY (INHALATION) at 04:13

## 2023-01-01 RX ADMIN — IPRATROPIUM BROMIDE AND ALBUTEROL SULFATE 3 ML: .5; 3 SOLUTION RESPIRATORY (INHALATION) at 00:13

## 2023-01-01 RX ADMIN — Medication 5 MG: at 21:10

## 2023-01-01 RX ADMIN — HEPARIN SODIUM 5000 UNITS: 10000 INJECTION, SOLUTION INTRAVENOUS; SUBCUTANEOUS at 14:28

## 2023-01-01 RX ADMIN — PROPOFOL 10 MG: 10 INJECTION, EMULSION INTRAVENOUS at 12:23

## 2023-01-01 RX ADMIN — ASPIRIN 81 MG: 81 TABLET, CHEWABLE ORAL at 08:45

## 2023-01-01 RX ADMIN — SODIUM BICARBONATE 325 MG: 650 TABLET ORAL at 11:10

## 2023-01-01 RX ADMIN — PIPERACILLIN AND TAZOBACTAM 3.38 G: 3; .375 INJECTION, POWDER, LYOPHILIZED, FOR SOLUTION INTRAVENOUS at 19:40

## 2023-01-01 RX ADMIN — BUMETANIDE 1.5 MG: 0.25 INJECTION INTRAMUSCULAR; INTRAVENOUS at 11:25

## 2023-01-01 RX ADMIN — ALBUTEROL SULFATE 2.5 MG: 2.5 SOLUTION RESPIRATORY (INHALATION) at 23:31

## 2023-01-01 RX ADMIN — MICONAZOLE NITRATE: 20 POWDER TOPICAL at 08:46

## 2023-01-01 RX ADMIN — ACETYLCYSTEINE 2 ML: 200 SOLUTION ORAL; RESPIRATORY (INHALATION) at 13:26

## 2023-01-01 RX ADMIN — VANCOMYCIN HYDROCHLORIDE 1750 MG: 10 INJECTION, POWDER, LYOPHILIZED, FOR SOLUTION INTRAVENOUS at 20:22

## 2023-01-01 RX ADMIN — HYDROCORTISONE SODIUM SUCCINATE 50 MG: 100 INJECTION, POWDER, FOR SOLUTION INTRAMUSCULAR; INTRAVENOUS at 11:44

## 2023-01-01 RX ADMIN — EPINEPHRINE 0.05 MCG/KG/MIN: 1 INJECTION INTRAMUSCULAR; INTRAVENOUS; SUBCUTANEOUS at 19:53

## 2023-01-01 RX ADMIN — FAMOTIDINE 20 MG: 20 TABLET ORAL at 08:22

## 2023-01-01 RX ADMIN — GUAIFENESIN AND DEXTROMETHORPHAN 10 ML: 100; 10 SYRUP ORAL at 08:01

## 2023-01-01 RX ADMIN — MIRTAZAPINE 15 MG: 15 TABLET, FILM COATED ORAL at 20:37

## 2023-01-01 RX ADMIN — POTASSIUM CHLORIDE 20 MEQ: 1.5 POWDER, FOR SOLUTION ORAL at 08:48

## 2023-01-01 RX ADMIN — ESCITALOPRAM OXALATE 10 MG: 10 TABLET ORAL at 14:09

## 2023-01-01 RX ADMIN — FAMOTIDINE 20 MG: 20 TABLET ORAL at 08:25

## 2023-01-01 RX ADMIN — INFLUENZA A VIRUS A/VICTORIA/4897/2022 IVR-238 (H1N1) ANTIGEN (FORMALDEHYDE INACTIVATED), INFLUENZA A VIRUS A/DARWIN/9/2021 SAN-010 (H3N2) ANTIGEN (FORMALDEHYDE INACTIVATED), INFLUENZA B VIRUS B/PHUKET/3073/2013 ANTIGEN (FORMALDEHYDE INACTIVATED), AND INFLUENZA B VIRUS B/MICHIGAN/01/2021 ANTIGEN (FORMALDEHYDE INACTIVATED) 0.7 ML: 60; 60; 60; 60 INJECTION, SUSPENSION INTRAMUSCULAR at 13:38

## 2023-01-01 RX ADMIN — FAMOTIDINE 20 MG: 20 TABLET ORAL at 07:56

## 2023-01-01 RX ADMIN — HEPARIN SODIUM AND DEXTROSE 850 UNITS/HR: 10000; 5 INJECTION INTRAVENOUS at 06:25

## 2023-01-01 RX ADMIN — PANTOPRAZOLE SODIUM 40 MG: 40 INJECTION, POWDER, FOR SOLUTION INTRAVENOUS at 08:09

## 2023-01-01 RX ADMIN — SIMETHICONE 40 MG: 20 EMULSION ORAL at 05:43

## 2023-01-01 RX ADMIN — IOPAMIDOL 80 ML: 755 INJECTION, SOLUTION INTRAVENOUS at 11:56

## 2023-01-01 RX ADMIN — Medication 250 MG: at 08:45

## 2023-01-01 RX ADMIN — IPRATROPIUM BROMIDE AND ALBUTEROL SULFATE 3 ML: .5; 3 SOLUTION RESPIRATORY (INHALATION) at 15:58

## 2023-01-01 RX ADMIN — NOREPINEPHRINE BITARTRATE 0.03 MCG/KG/MIN: 0.06 INJECTION, SOLUTION INTRAVENOUS at 00:47

## 2023-01-01 RX ADMIN — MICONAZOLE NITRATE: 20 POWDER TOPICAL at 21:23

## 2023-01-01 RX ADMIN — HYDROCORTISONE SODIUM SUCCINATE 50 MG: 100 INJECTION, POWDER, FOR SOLUTION INTRAMUSCULAR; INTRAVENOUS at 16:48

## 2023-01-01 RX ADMIN — HYDROMORPHONE HYDROCHLORIDE 0.4 MG: 0.2 INJECTION, SOLUTION INTRAMUSCULAR; INTRAVENOUS; SUBCUTANEOUS at 15:19

## 2023-01-01 RX ADMIN — Medication 250 MG: at 23:58

## 2023-01-01 RX ADMIN — SULFAMETHOXAZOLE AND TRIMETHOPRIM 1 TABLET: 400; 80 TABLET ORAL at 16:30

## 2023-01-01 RX ADMIN — PANTOPRAZOLE SODIUM 40 MG: 40 INJECTION, POWDER, FOR SOLUTION INTRAVENOUS at 08:47

## 2023-01-01 RX ADMIN — GUAIFENESIN AND DEXTROMETHORPHAN 10 ML: 100; 10 SYRUP ORAL at 04:31

## 2023-01-01 RX ADMIN — HYDROCORTISONE SODIUM SUCCINATE 50 MG: 100 INJECTION, POWDER, FOR SOLUTION INTRAMUSCULAR; INTRAVENOUS at 05:31

## 2023-01-01 RX ADMIN — GUAIFENESIN AND DEXTROMETHORPHAN 10 ML: 100; 10 SYRUP ORAL at 00:00

## 2023-01-01 RX ADMIN — HYDROXYZINE HYDROCHLORIDE 25 MG: 25 TABLET ORAL at 23:59

## 2023-01-01 RX ADMIN — PROTAMINE SULFATE 20 MG: 10 INJECTION, SOLUTION INTRAVENOUS at 18:03

## 2023-01-01 RX ADMIN — ALBUTEROL SULFATE 2.5 MG: 2.5 SOLUTION RESPIRATORY (INHALATION) at 16:28

## 2023-01-01 RX ADMIN — PIPERACILLIN AND TAZOBACTAM 3.38 G: 3; .375 INJECTION, POWDER, LYOPHILIZED, FOR SOLUTION INTRAVENOUS at 01:53

## 2023-01-01 RX ADMIN — PROPOFOL 20 MG: 10 INJECTION, EMULSION INTRAVENOUS at 09:01

## 2023-01-01 RX ADMIN — HYDROCORTISONE SODIUM SUCCINATE 50 MG: 100 INJECTION, POWDER, FOR SOLUTION INTRAMUSCULAR; INTRAVENOUS at 23:54

## 2023-01-01 RX ADMIN — MIRTAZAPINE 15 MG: 15 TABLET, FILM COATED ORAL at 21:58

## 2023-01-01 RX ADMIN — MULTIVIT AND MINERALS-FERROUS GLUCONATE 9 MG IRON/15 ML ORAL LIQUID 15 ML: at 09:03

## 2023-01-01 RX ADMIN — POTASSIUM CHLORIDE 20 MEQ: 1.5 POWDER, FOR SOLUTION ORAL at 19:32

## 2023-01-01 RX ADMIN — METOPROLOL TARTRATE 25 MG: 25 TABLET, FILM COATED ORAL at 21:26

## 2023-01-01 RX ADMIN — ACETAMINOPHEN 650 MG: 325 TABLET, FILM COATED ORAL at 10:58

## 2023-01-01 RX ADMIN — VANCOMYCIN HYDROCHLORIDE 125 MG: KIT at 08:02

## 2023-01-01 RX ADMIN — HYDROMORPHONE HYDROCHLORIDE 0.4 MG: 0.2 INJECTION, SOLUTION INTRAMUSCULAR; INTRAVENOUS; SUBCUTANEOUS at 12:52

## 2023-01-01 RX ADMIN — ACETYLCYSTEINE 2 ML: 200 SOLUTION ORAL; RESPIRATORY (INHALATION) at 15:27

## 2023-01-01 RX ADMIN — Medication 5 MG: at 20:55

## 2023-01-01 RX ADMIN — FENTANYL CITRATE 100 MCG: 50 INJECTION INTRAMUSCULAR; INTRAVENOUS at 14:57

## 2023-01-01 RX ADMIN — BUMETANIDE 1.5 MG: 0.25 INJECTION INTRAMUSCULAR; INTRAVENOUS at 23:47

## 2023-01-01 RX ADMIN — ALBUTEROL SULFATE 2.5 MG: 2.5 SOLUTION RESPIRATORY (INHALATION) at 19:54

## 2023-01-01 RX ADMIN — HYDROCORTISONE SODIUM SUCCINATE 50 MG: 100 INJECTION, POWDER, FOR SOLUTION INTRAMUSCULAR; INTRAVENOUS at 18:06

## 2023-01-01 RX ADMIN — PROTAMINE SULFATE 20 MG: 10 INJECTION, SOLUTION INTRAVENOUS at 18:05

## 2023-01-01 RX ADMIN — ENOXAPARIN SODIUM 40 MG: 40 INJECTION SUBCUTANEOUS at 21:11

## 2023-01-01 RX ADMIN — HYDRALAZINE HYDROCHLORIDE 10 MG: 20 INJECTION INTRAMUSCULAR; INTRAVENOUS at 15:59

## 2023-01-01 RX ADMIN — ONDANSETRON 4 MG: 2 INJECTION INTRAMUSCULAR; INTRAVENOUS at 08:40

## 2023-01-01 RX ADMIN — FAMOTIDINE 20 MG: 20 TABLET ORAL at 08:27

## 2023-01-01 RX ADMIN — GUAIFENESIN AND DEXTROMETHORPHAN 10 ML: 100; 10 SYRUP ORAL at 00:29

## 2023-01-01 RX ADMIN — SODIUM BICARBONATE 325 MG: 650 TABLET ORAL at 18:21

## 2023-01-01 RX ADMIN — ACETYLCYSTEINE 2 ML: 200 SOLUTION ORAL; RESPIRATORY (INHALATION) at 15:52

## 2023-01-01 RX ADMIN — FUROSEMIDE 60 MG: 10 INJECTION, SOLUTION INTRAMUSCULAR; INTRAVENOUS at 13:26

## 2023-01-01 RX ADMIN — TORSEMIDE 40 MG: 20 TABLET ORAL at 07:53

## 2023-01-01 RX ADMIN — SODIUM CHLORIDE, POTASSIUM CHLORIDE, SODIUM LACTATE AND CALCIUM CHLORIDE 500 ML: 600; 310; 30; 20 INJECTION, SOLUTION INTRAVENOUS at 05:10

## 2023-01-01 RX ADMIN — HYDROXYZINE HYDROCHLORIDE 25 MG: 25 TABLET, FILM COATED ORAL at 00:32

## 2023-01-01 RX ADMIN — WARFARIN SODIUM 3 MG: 3 TABLET ORAL at 18:10

## 2023-01-01 RX ADMIN — IPRATROPIUM BROMIDE AND ALBUTEROL SULFATE 3 ML: .5; 3 SOLUTION RESPIRATORY (INHALATION) at 08:07

## 2023-01-01 RX ADMIN — Medication 2 MG: at 13:07

## 2023-01-01 RX ADMIN — HYDROXYZINE HYDROCHLORIDE 25 MG: 25 TABLET ORAL at 11:45

## 2023-01-01 RX ADMIN — ALBUTEROL SULFATE 2.5 MG: 2.5 SOLUTION RESPIRATORY (INHALATION) at 09:01

## 2023-01-01 RX ADMIN — PIPERACILLIN AND TAZOBACTAM 3.38 G: 3; .375 INJECTION, POWDER, LYOPHILIZED, FOR SOLUTION INTRAVENOUS at 20:05

## 2023-01-01 RX ADMIN — Medication 250 MG: at 20:59

## 2023-01-01 RX ADMIN — ACETAMINOPHEN 650 MG: 325 TABLET, FILM COATED ORAL at 00:29

## 2023-01-01 RX ADMIN — HEPARIN SODIUM 5000 UNITS: 5000 INJECTION, SOLUTION INTRAVENOUS; SUBCUTANEOUS at 21:09

## 2023-01-01 RX ADMIN — IPRATROPIUM BROMIDE AND ALBUTEROL SULFATE 3 ML: .5; 3 SOLUTION RESPIRATORY (INHALATION) at 11:28

## 2023-01-01 RX ADMIN — PHENYLEPHRINE HYDROCHLORIDE 50 MCG: 10 INJECTION INTRAVENOUS at 11:35

## 2023-01-01 RX ADMIN — WARFARIN SODIUM 1 MG: 1 TABLET ORAL at 18:51

## 2023-01-01 RX ADMIN — MICONAZOLE NITRATE: 20 POWDER TOPICAL at 21:52

## 2023-01-01 RX ADMIN — PROPOFOL 30 MCG/KG/MIN: 10 INJECTION, EMULSION INTRAVENOUS at 19:39

## 2023-01-01 RX ADMIN — DEXTROSE MONOHYDRATE 300 MG: 50 INJECTION, SOLUTION INTRAVENOUS at 09:38

## 2023-01-01 RX ADMIN — PROTAMINE SULFATE 20 MG: 10 INJECTION, SOLUTION INTRAVENOUS at 18:02

## 2023-01-01 RX ADMIN — NOREPINEPHRINE BITARTRATE 0.03 MCG/KG/MIN: 0.06 INJECTION, SOLUTION INTRAVENOUS at 23:56

## 2023-01-01 RX ADMIN — HYDRALAZINE HYDROCHLORIDE 10 MG: 20 INJECTION INTRAMUSCULAR; INTRAVENOUS at 23:04

## 2023-01-01 RX ADMIN — ASPIRIN 81 MG CHEWABLE TABLET 81 MG: 81 TABLET CHEWABLE at 07:56

## 2023-01-01 RX ADMIN — DEXMEDETOMIDINE HYDROCHLORIDE 0.2 MCG/KG/HR: 400 INJECTION INTRAVENOUS at 19:36

## 2023-01-01 RX ADMIN — Medication 250 MG: at 21:11

## 2023-01-01 RX ADMIN — ACETAMINOPHEN 650 MG: 325 TABLET, FILM COATED ORAL at 16:54

## 2023-01-01 RX ADMIN — ASPIRIN 81 MG: 81 TABLET, CHEWABLE ORAL at 08:27

## 2023-01-01 RX ADMIN — MULTIVIT AND MINERALS-FERROUS GLUCONATE 9 MG IRON/15 ML ORAL LIQUID 15 ML: at 08:27

## 2023-01-01 RX ADMIN — ALBUTEROL SULFATE 2.5 MG: 2.5 SOLUTION RESPIRATORY (INHALATION) at 12:14

## 2023-01-01 RX ADMIN — MIRTAZAPINE 15 MG: 15 TABLET, FILM COATED ORAL at 23:58

## 2023-01-01 RX ADMIN — HYDROXYZINE HYDROCHLORIDE 25 MG: 25 TABLET ORAL at 21:13

## 2023-01-01 RX ADMIN — MIRTAZAPINE 15 MG: 15 TABLET, FILM COATED ORAL at 20:45

## 2023-01-01 RX ADMIN — POTASSIUM CHLORIDE 20 MEQ: 1.5 POWDER, FOR SOLUTION ORAL at 23:57

## 2023-01-01 RX ADMIN — VANCOMYCIN HYDROCHLORIDE 1000 MG: 1 INJECTION, SOLUTION INTRAVENOUS at 13:18

## 2023-01-01 RX ADMIN — ACETYLCYSTEINE 2 ML: 200 SOLUTION ORAL; RESPIRATORY (INHALATION) at 17:00

## 2023-01-01 RX ADMIN — Medication 250 MG: at 21:31

## 2023-01-01 RX ADMIN — PANTOPRAZOLE SODIUM 40 MG: 40 TABLET, DELAYED RELEASE ORAL at 08:28

## 2023-01-01 RX ADMIN — ACETYLCYSTEINE 2 ML: 200 SOLUTION ORAL; RESPIRATORY (INHALATION) at 15:43

## 2023-01-01 RX ADMIN — Medication 2 MG: at 12:13

## 2023-01-01 RX ADMIN — ASPIRIN 81 MG CHEWABLE TABLET 81 MG: 81 TABLET CHEWABLE at 08:09

## 2023-01-01 RX ADMIN — MEROPENEM 1000 MG: 1 INJECTION, POWDER, FOR SOLUTION INTRAVENOUS at 10:05

## 2023-01-01 RX ADMIN — Medication 5 MG: at 20:12

## 2023-01-01 RX ADMIN — ACETAMINOPHEN 650 MG: 325 TABLET, FILM COATED ORAL at 08:30

## 2023-01-01 RX ADMIN — HEPARIN SODIUM 5000 UNITS: 5000 INJECTION INTRAVENOUS; SUBCUTANEOUS at 20:38

## 2023-01-01 RX ADMIN — IPRATROPIUM BROMIDE AND ALBUTEROL SULFATE 3 ML: .5; 3 SOLUTION RESPIRATORY (INHALATION) at 08:31

## 2023-01-01 RX ADMIN — SPIRONOLACTONE 25 MG: 25 TABLET ORAL at 07:53

## 2023-01-01 RX ADMIN — MULTIVIT AND MINERALS-FERROUS GLUCONATE 9 MG IRON/15 ML ORAL LIQUID 15 ML: at 08:35

## 2023-01-01 RX ADMIN — ALBUTEROL SULFATE 2.5 MG: 2.5 SOLUTION RESPIRATORY (INHALATION) at 20:36

## 2023-01-01 RX ADMIN — ACETYLCYSTEINE 2 ML: 200 SOLUTION ORAL; RESPIRATORY (INHALATION) at 20:24

## 2023-01-01 RX ADMIN — FENTANYL CITRATE 200 MCG: 50 INJECTION INTRAMUSCULAR; INTRAVENOUS at 13:39

## 2023-01-01 RX ADMIN — Medication 2 MG: at 11:57

## 2023-01-01 RX ADMIN — Medication 2 MG: at 09:25

## 2023-01-01 RX ADMIN — GUAIFENESIN AND DEXTROMETHORPHAN 10 ML: 100; 10 SYRUP ORAL at 00:08

## 2023-01-01 RX ADMIN — ACETYLCYSTEINE 2 ML: 200 SOLUTION ORAL; RESPIRATORY (INHALATION) at 20:30

## 2023-01-01 RX ADMIN — POTASSIUM CHLORIDE 20 MEQ: 1.5 POWDER, FOR SOLUTION ORAL at 08:20

## 2023-01-01 RX ADMIN — SIMETHICONE 40 MG: 20 EMULSION ORAL at 21:11

## 2023-01-01 RX ADMIN — VANCOMYCIN HYDROCHLORIDE 125 MG: KIT at 08:35

## 2023-01-01 RX ADMIN — FUROSEMIDE 40 MG: 10 INJECTION, SOLUTION INTRAVENOUS at 15:48

## 2023-01-01 RX ADMIN — ACETYLCYSTEINE 2 ML: 200 SOLUTION ORAL; RESPIRATORY (INHALATION) at 16:41

## 2023-01-01 RX ADMIN — IPRATROPIUM BROMIDE AND ALBUTEROL SULFATE 3 ML: .5; 3 SOLUTION RESPIRATORY (INHALATION) at 16:41

## 2023-01-01 RX ADMIN — ALBUTEROL SULFATE 2.5 MG: 2.5 SOLUTION RESPIRATORY (INHALATION) at 20:52

## 2023-01-01 RX ADMIN — Medication 250 MG: at 08:35

## 2023-01-01 RX ADMIN — HYDROMORPHONE HYDROCHLORIDE 0.2 MG: 0.2 INJECTION, SOLUTION INTRAMUSCULAR; INTRAVENOUS; SUBCUTANEOUS at 08:24

## 2023-01-01 RX ADMIN — ALBUTEROL SULFATE 2.5 MG: 2.5 SOLUTION RESPIRATORY (INHALATION) at 12:00

## 2023-01-01 RX ADMIN — TORSEMIDE 10 MG: 10 TABLET ORAL at 11:58

## 2023-01-01 RX ADMIN — ALBUTEROL SULFATE 2.5 MG: 2.5 SOLUTION RESPIRATORY (INHALATION) at 20:06

## 2023-01-01 RX ADMIN — DEXMEDETOMIDINE HYDROCHLORIDE 0.6 MCG/KG/HR: 400 INJECTION INTRAVENOUS at 23:21

## 2023-01-01 RX ADMIN — Medication 30 MG: at 16:05

## 2023-01-01 RX ADMIN — ACETYLCYSTEINE 2 ML: 200 SOLUTION ORAL; RESPIRATORY (INHALATION) at 07:19

## 2023-01-01 RX ADMIN — IPRATROPIUM BROMIDE AND ALBUTEROL SULFATE 3 ML: .5; 3 SOLUTION RESPIRATORY (INHALATION) at 09:00

## 2023-01-01 RX ADMIN — GUAIFENESIN AND DEXTROMETHORPHAN 10 ML: 100; 10 SYRUP ORAL at 12:24

## 2023-01-01 RX ADMIN — ESCITALOPRAM OXALATE 10 MG: 10 TABLET ORAL at 17:39

## 2023-01-01 RX ADMIN — Medication 5 MG: at 20:06

## 2023-01-01 RX ADMIN — SIMETHICONE 40 MG: 20 EMULSION ORAL at 21:26

## 2023-01-01 RX ADMIN — ALBUTEROL SULFATE 2.5 MG: 2.5 SOLUTION RESPIRATORY (INHALATION) at 19:15

## 2023-01-01 RX ADMIN — FAMOTIDINE 20 MG: 20 TABLET ORAL at 08:28

## 2023-01-01 RX ADMIN — ALBUTEROL SULFATE 2.5 MG: 2.5 SOLUTION RESPIRATORY (INHALATION) at 06:11

## 2023-01-01 RX ADMIN — CHLORHEXIDINE GLUCONATE 0.12% ORAL RINSE 15 ML: 1.2 LIQUID ORAL at 20:16

## 2023-01-01 RX ADMIN — SUGAMMADEX 200 MG: 100 INJECTION, SOLUTION INTRAVENOUS at 15:53

## 2023-01-01 RX ADMIN — FAMOTIDINE 20 MG: 20 TABLET ORAL at 08:03

## 2023-01-01 RX ADMIN — BARIUM SULFATE 60 ML: 400 SUSPENSION ORAL at 12:59

## 2023-01-01 RX ADMIN — Medication 250 MG: at 08:25

## 2023-01-01 RX ADMIN — HEPARIN SODIUM 5000 UNITS: 5000 INJECTION, SOLUTION INTRAVENOUS; SUBCUTANEOUS at 05:59

## 2023-01-01 RX ADMIN — DEXAMETHASONE SODIUM PHOSPHATE 10 MG: 4 INJECTION, SOLUTION INTRA-ARTICULAR; INTRALESIONAL; INTRAMUSCULAR; INTRAVENOUS; SOFT TISSUE at 08:48

## 2023-01-01 RX ADMIN — FENTANYL CITRATE 100 MCG: 50 INJECTION, SOLUTION INTRAMUSCULAR; INTRAVENOUS at 08:17

## 2023-01-01 RX ADMIN — ACETYLCYSTEINE 2 ML: 200 SOLUTION ORAL; RESPIRATORY (INHALATION) at 08:36

## 2023-01-01 RX ADMIN — Medication 100 MCG/HR: at 21:21

## 2023-01-01 RX ADMIN — BUMETANIDE 1 MG: 1 TABLET ORAL at 09:03

## 2023-01-01 RX ADMIN — CHLORHEXIDINE GLUCONATE 15 ML: 1.2 RINSE ORAL at 20:11

## 2023-01-01 RX ADMIN — SIMETHICONE 40 MG: 20 EMULSION ORAL at 00:26

## 2023-01-01 RX ADMIN — ASPIRIN 81 MG: 81 TABLET, CHEWABLE ORAL at 08:28

## 2023-01-01 RX ADMIN — LIDOCAINE HYDROCHLORIDE 100 MG: 20 INJECTION, SOLUTION INFILTRATION; PERINEURAL at 08:17

## 2023-01-01 RX ADMIN — ACETYLCYSTEINE 2 ML: 200 SOLUTION ORAL; RESPIRATORY (INHALATION) at 07:07

## 2023-01-01 RX ADMIN — HYDROCORTISONE SODIUM SUCCINATE 50 MG: 100 INJECTION, POWDER, FOR SOLUTION INTRAMUSCULAR; INTRAVENOUS at 05:07

## 2023-01-01 RX ADMIN — HEPARIN SODIUM 5000 UNITS: 5000 INJECTION, SOLUTION INTRAVENOUS; SUBCUTANEOUS at 12:22

## 2023-01-01 RX ADMIN — SUGAMMADEX 200 MG: 100 INJECTION, SOLUTION INTRAVENOUS at 11:40

## 2023-01-01 RX ADMIN — PIPERACILLIN AND TAZOBACTAM 3.38 G: 3; .375 INJECTION, POWDER, LYOPHILIZED, FOR SOLUTION INTRAVENOUS at 08:21

## 2023-01-01 RX ADMIN — POTASSIUM PHOSPHATE, MONOBASIC POTASSIUM PHOSPHATE, DIBASIC 15 MMOL: 224; 236 INJECTION, SOLUTION, CONCENTRATE INTRAVENOUS at 16:20

## 2023-01-01 RX ADMIN — ANORECTAL OINTMENT: 15.7; .44; 24; 20.6 OINTMENT TOPICAL at 08:31

## 2023-01-01 RX ADMIN — ALBUTEROL SULFATE 2.5 MG: 2.5 SOLUTION RESPIRATORY (INHALATION) at 12:36

## 2023-01-01 RX ADMIN — SPIRONOLACTONE 25 MG: 25 TABLET ORAL at 08:28

## 2023-01-01 RX ADMIN — FUROSEMIDE 60 MG: 10 INJECTION, SOLUTION INTRAMUSCULAR; INTRAVENOUS at 08:29

## 2023-01-01 RX ADMIN — ASPIRIN 81 MG CHEWABLE TABLET 81 MG: 81 TABLET CHEWABLE at 08:27

## 2023-01-01 RX ADMIN — HYDROXYZINE HYDROCHLORIDE 25 MG: 25 TABLET ORAL at 09:37

## 2023-01-01 RX ADMIN — POTASSIUM CHLORIDE 10 MEQ: 7.46 INJECTION, SOLUTION INTRAVENOUS at 11:18

## 2023-01-01 RX ADMIN — HEPARIN SODIUM 5000 UNITS: 5000 INJECTION, SOLUTION INTRAVENOUS; SUBCUTANEOUS at 14:13

## 2023-01-01 RX ADMIN — SIMETHICONE 40 MG: 20 EMULSION ORAL at 03:44

## 2023-01-01 RX ADMIN — HYDROXYZINE HYDROCHLORIDE 25 MG: 25 TABLET ORAL at 20:35

## 2023-01-01 RX ADMIN — ACETYLCYSTEINE 2 ML: 200 SOLUTION ORAL; RESPIRATORY (INHALATION) at 20:21

## 2023-01-01 RX ADMIN — Medication 250 MG: at 08:29

## 2023-01-01 RX ADMIN — HYDROCORTISONE SODIUM SUCCINATE 50 MG: 100 INJECTION, POWDER, FOR SOLUTION INTRAMUSCULAR; INTRAVENOUS at 06:35

## 2023-01-01 RX ADMIN — LOPERAMIDE HCL 2 MG: 1 SOLUTION ORAL at 08:45

## 2023-01-01 RX ADMIN — POTASSIUM CHLORIDE 10 MEQ: 7.46 INJECTION, SOLUTION INTRAVENOUS at 10:06

## 2023-01-01 RX ADMIN — CALCIUM GLUCONATE 1 G: 20 INJECTION, SOLUTION INTRAVENOUS at 02:04

## 2023-01-01 RX ADMIN — BUMETANIDE 2 MG: 0.25 INJECTION INTRAMUSCULAR; INTRAVENOUS at 00:42

## 2023-01-01 RX ADMIN — SODIUM CHLORIDE: 9 INJECTION, SOLUTION INTRAVENOUS at 09:09

## 2023-01-01 RX ADMIN — BUMETANIDE 1 MG: 0.25 INJECTION INTRAMUSCULAR; INTRAVENOUS at 23:53

## 2023-01-01 RX ADMIN — SIMETHICONE 40 MG: 20 EMULSION ORAL at 21:19

## 2023-01-01 RX ADMIN — MICONAZOLE NITRATE: 20 POWDER TOPICAL at 20:06

## 2023-01-01 RX ADMIN — Medication 5 MG: at 20:51

## 2023-01-01 RX ADMIN — ALBUTEROL SULFATE 2.5 MG: 2.5 SOLUTION RESPIRATORY (INHALATION) at 07:53

## 2023-01-01 RX ADMIN — IPRATROPIUM BROMIDE AND ALBUTEROL SULFATE 3 ML: .5; 3 SOLUTION RESPIRATORY (INHALATION) at 21:44

## 2023-01-01 RX ADMIN — WARFARIN SODIUM 3 MG: 3 TABLET ORAL at 17:57

## 2023-01-01 RX ADMIN — BUMETANIDE 2 MG: 0.25 INJECTION INTRAMUSCULAR; INTRAVENOUS at 18:05

## 2023-01-01 RX ADMIN — ACETYLCYSTEINE 2 ML: 200 SOLUTION ORAL; RESPIRATORY (INHALATION) at 12:00

## 2023-01-01 RX ADMIN — MICONAZOLE NITRATE: 20 POWDER TOPICAL at 20:12

## 2023-01-01 RX ADMIN — LIDOCAINE HYDROCHLORIDE 5 ML: 20 SOLUTION OROPHARYNGEAL at 15:13

## 2023-01-01 RX ADMIN — IPRATROPIUM BROMIDE AND ALBUTEROL SULFATE 3 ML: .5; 3 SOLUTION RESPIRATORY (INHALATION) at 21:23

## 2023-01-01 RX ADMIN — ACETAMINOPHEN 650 MG: 325 TABLET, FILM COATED ORAL at 19:58

## 2023-01-01 RX ADMIN — ASPIRIN 81 MG: 81 TABLET, CHEWABLE ORAL at 08:01

## 2023-01-01 RX ADMIN — Medication 2 MG: at 13:25

## 2023-01-01 RX ADMIN — LOPERAMIDE HCL 2 MG: 1 SOLUTION ORAL at 16:05

## 2023-01-01 RX ADMIN — Medication 5 MG: at 20:31

## 2023-01-01 RX ADMIN — FAMOTIDINE 20 MG: 20 TABLET ORAL at 08:29

## 2023-01-01 RX ADMIN — WARFARIN SODIUM 3 MG: 3 TABLET ORAL at 18:12

## 2023-01-01 RX ADMIN — ALBUTEROL SULFATE 2.5 MG: 2.5 SOLUTION RESPIRATORY (INHALATION) at 08:36

## 2023-01-01 RX ADMIN — NOREPINEPHRINE BITARTRATE 0.03 MCG/KG/MIN: 0.06 INJECTION, SOLUTION INTRAVENOUS at 21:34

## 2023-01-01 RX ADMIN — PROPOFOL 25 MCG/KG/MIN: 10 INJECTION, EMULSION INTRAVENOUS at 13:21

## 2023-01-01 RX ADMIN — MULTIVIT AND MINERALS-FERROUS GLUCONATE 9 MG IRON/15 ML ORAL LIQUID 15 ML: at 08:24

## 2023-01-01 RX ADMIN — DEXMEDETOMIDINE HYDROCHLORIDE 0.4 MCG/KG/HR: 400 INJECTION INTRAVENOUS at 00:48

## 2023-01-01 RX ADMIN — MIRTAZAPINE 15 MG: 15 TABLET, FILM COATED ORAL at 20:31

## 2023-01-01 RX ADMIN — HYDROCHLOROTHIAZIDE 25 MG: 25 TABLET ORAL at 13:29

## 2023-01-01 RX ADMIN — PROPOFOL 90 MG: 10 INJECTION, EMULSION INTRAVENOUS at 08:17

## 2023-01-01 RX ADMIN — ACETAMINOPHEN 650 MG: 325 TABLET, FILM COATED ORAL at 23:56

## 2023-01-01 RX ADMIN — ACETYLCYSTEINE 2 ML: 200 SOLUTION ORAL; RESPIRATORY (INHALATION) at 20:57

## 2023-01-01 RX ADMIN — ALBUTEROL SULFATE 2.5 MG: 2.5 SOLUTION RESPIRATORY (INHALATION) at 00:08

## 2023-01-01 RX ADMIN — SODIUM CHLORIDE, POTASSIUM CHLORIDE, SODIUM LACTATE AND CALCIUM CHLORIDE: 600; 310; 30; 20 INJECTION, SOLUTION INTRAVENOUS at 08:30

## 2023-01-01 RX ADMIN — PANTOPRAZOLE SODIUM 40 MG: 40 INJECTION, POWDER, FOR SOLUTION INTRAVENOUS at 09:14

## 2023-01-01 RX ADMIN — MICONAZOLE NITRATE: 20 POWDER TOPICAL at 21:31

## 2023-01-01 RX ADMIN — PANTOPRAZOLE SODIUM 40 MG: 40 TABLET, DELAYED RELEASE ORAL at 08:34

## 2023-01-01 RX ADMIN — VANCOMYCIN HYDROCHLORIDE 125 MG: KIT at 19:55

## 2023-01-01 RX ADMIN — ALBUTEROL SULFATE 2.5 MG: 2.5 SOLUTION RESPIRATORY (INHALATION) at 00:28

## 2023-01-01 RX ADMIN — IPRATROPIUM BROMIDE AND ALBUTEROL SULFATE 3 ML: .5; 3 SOLUTION RESPIRATORY (INHALATION) at 13:31

## 2023-01-01 RX ADMIN — ALBUTEROL SULFATE 2.5 MG: 2.5 SOLUTION RESPIRATORY (INHALATION) at 13:31

## 2023-01-01 RX ADMIN — ALBUTEROL SULFATE 2.5 MG: 2.5 SOLUTION RESPIRATORY (INHALATION) at 00:00

## 2023-01-01 RX ADMIN — ALBUTEROL SULFATE 2.5 MG: 2.5 SOLUTION RESPIRATORY (INHALATION) at 15:52

## 2023-01-01 RX ADMIN — ACETYLCYSTEINE 2 ML: 200 SOLUTION ORAL; RESPIRATORY (INHALATION) at 11:29

## 2023-01-01 RX ADMIN — ACETYLCYSTEINE 2 ML: 200 SOLUTION ORAL; RESPIRATORY (INHALATION) at 00:28

## 2023-01-01 RX ADMIN — POTASSIUM CHLORIDE 20 MEQ: 1.5 POWDER, FOR SOLUTION ORAL at 08:30

## 2023-01-01 RX ADMIN — HEPARIN SODIUM 5000 UNITS: 5000 INJECTION, SOLUTION INTRAVENOUS; SUBCUTANEOUS at 12:06

## 2023-01-01 RX ADMIN — ASPIRIN 300 MG: 300 SUPPOSITORY RECTAL at 11:59

## 2023-01-01 RX ADMIN — ESCITALOPRAM OXALATE 10 MG: 10 TABLET ORAL at 08:35

## 2023-01-01 RX ADMIN — HEPARIN SODIUM 5000 UNITS: 5000 INJECTION, SOLUTION INTRAVENOUS; SUBCUTANEOUS at 04:29

## 2023-01-01 RX ADMIN — PROPOFOL 50 MG: 10 INJECTION, EMULSION INTRAVENOUS at 13:40

## 2023-01-01 RX ADMIN — HYDROCORTISONE SODIUM SUCCINATE 50 MG: 100 INJECTION, POWDER, FOR SOLUTION INTRAMUSCULAR; INTRAVENOUS at 05:00

## 2023-01-01 RX ADMIN — ASPIRIN 81 MG: 81 TABLET, CHEWABLE ORAL at 08:15

## 2023-01-01 RX ADMIN — ACETYLCYSTEINE 2 ML: 200 SOLUTION ORAL; RESPIRATORY (INHALATION) at 20:06

## 2023-01-01 RX ADMIN — ACETYLCYSTEINE 2 ML: 200 SOLUTION ORAL; RESPIRATORY (INHALATION) at 15:44

## 2023-01-01 RX ADMIN — Medication 250 MG: at 20:55

## 2023-01-01 RX ADMIN — PHENYLEPHRINE HYDROCHLORIDE 100 MCG: 10 INJECTION INTRAVENOUS at 12:46

## 2023-01-01 RX ADMIN — ONDANSETRON 4 MG: 2 INJECTION INTRAMUSCULAR; INTRAVENOUS at 11:37

## 2023-01-01 RX ADMIN — HEPARIN SODIUM 5000 UNITS: 5000 INJECTION, SOLUTION INTRAVENOUS; SUBCUTANEOUS at 19:33

## 2023-01-01 RX ADMIN — SIMETHICONE 40 MG: 20 EMULSION ORAL at 20:46

## 2023-01-01 RX ADMIN — CEFAZOLIN 1 G: 1 INJECTION, POWDER, FOR SOLUTION INTRAMUSCULAR; INTRAVENOUS at 14:15

## 2023-01-01 RX ADMIN — ACETYLCYSTEINE 2 ML: 200 SOLUTION ORAL; RESPIRATORY (INHALATION) at 07:54

## 2023-01-01 RX ADMIN — VANCOMYCIN HYDROCHLORIDE 125 MG: KIT at 08:47

## 2023-01-01 RX ADMIN — FENTANYL CITRATE 50 MCG: 50 INJECTION, SOLUTION INTRAMUSCULAR; INTRAVENOUS at 12:10

## 2023-01-01 RX ADMIN — POTASSIUM CHLORIDE 20 MEQ: 1.5 SOLUTION ORAL at 08:15

## 2023-01-01 RX ADMIN — IPRATROPIUM BROMIDE AND ALBUTEROL SULFATE 3 ML: .5; 3 SOLUTION RESPIRATORY (INHALATION) at 04:13

## 2023-01-01 RX ADMIN — HYDROXYZINE HYDROCHLORIDE 25 MG: 25 TABLET ORAL at 21:26

## 2023-01-01 RX ADMIN — CHLORHEXIDINE GLUCONATE 15 ML: 1.2 RINSE ORAL at 08:47

## 2023-01-01 RX ADMIN — ACETYLCYSTEINE 2 ML: 200 SOLUTION ORAL; RESPIRATORY (INHALATION) at 08:12

## 2023-01-01 RX ADMIN — Medication 2 MG: at 11:24

## 2023-01-01 RX ADMIN — GUAIFENESIN AND DEXTROMETHORPHAN 10 ML: 100; 10 SYRUP ORAL at 04:54

## 2023-01-01 RX ADMIN — ALBUTEROL SULFATE 2.5 MG: 2.5 SOLUTION RESPIRATORY (INHALATION) at 03:27

## 2023-01-01 RX ADMIN — ALBUTEROL SULFATE 2.5 MG: 2.5 SOLUTION RESPIRATORY (INHALATION) at 12:27

## 2023-01-01 RX ADMIN — PROPOFOL 50 MG: 10 INJECTION, EMULSION INTRAVENOUS at 14:55

## 2023-01-01 RX ADMIN — ACETAMINOPHEN 650 MG: 325 TABLET, FILM COATED ORAL at 21:10

## 2023-01-01 RX ADMIN — FAMOTIDINE 20 MG: 20 TABLET ORAL at 08:36

## 2023-01-01 RX ADMIN — ASPIRIN 81 MG: 81 TABLET, CHEWABLE ORAL at 08:36

## 2023-01-01 RX ADMIN — POTASSIUM CHLORIDE 20 MEQ: 1.5 SOLUTION ORAL at 09:03

## 2023-01-01 RX ADMIN — Medication 15 ML: at 17:18

## 2023-01-01 RX ADMIN — Medication 12.5 MG: at 12:11

## 2023-01-01 RX ADMIN — HYDROMORPHONE HYDROCHLORIDE 0.2 MG: 0.2 INJECTION, SOLUTION INTRAMUSCULAR; INTRAVENOUS; SUBCUTANEOUS at 16:31

## 2023-01-01 RX ADMIN — ALBUTEROL SULFATE 2 PUFF: 90 INHALANT RESPIRATORY (INHALATION) at 11:37

## 2023-01-01 RX ADMIN — BUMETANIDE 4 MG: 0.25 INJECTION INTRAMUSCULAR; INTRAVENOUS at 20:16

## 2023-01-01 RX ADMIN — INSULIN ASPART 1 UNITS: 100 INJECTION, SOLUTION INTRAVENOUS; SUBCUTANEOUS at 15:54

## 2023-01-01 RX ADMIN — MIRTAZAPINE 15 MG: 15 TABLET, FILM COATED ORAL at 21:31

## 2023-01-01 RX ADMIN — WARFARIN SODIUM 4 MG: 4 TABLET ORAL at 18:07

## 2023-01-01 RX ADMIN — WARFARIN SODIUM 0.5 MG: 1 TABLET ORAL at 18:21

## 2023-01-01 RX ADMIN — IPRATROPIUM BROMIDE AND ALBUTEROL SULFATE 3 ML: .5; 3 SOLUTION RESPIRATORY (INHALATION) at 09:36

## 2023-01-01 RX ADMIN — Medication 250 MG: at 20:46

## 2023-01-01 RX ADMIN — PHENYLEPHRINE HYDROCHLORIDE 200 MCG: 10 INJECTION INTRAVENOUS at 12:28

## 2023-01-01 RX ADMIN — HYDROXYZINE HYDROCHLORIDE 25 MG: 25 TABLET ORAL at 08:30

## 2023-01-01 RX ADMIN — ACETYLCYSTEINE 2 ML: 200 SOLUTION ORAL; RESPIRATORY (INHALATION) at 23:15

## 2023-01-01 RX ADMIN — POTASSIUM CHLORIDE 20 MEQ: 1.5 SOLUTION ORAL at 08:51

## 2023-01-01 RX ADMIN — ALBUTEROL SULFATE 2.5 MG: 2.5 SOLUTION RESPIRATORY (INHALATION) at 20:21

## 2023-01-01 RX ADMIN — ACETAMINOPHEN 975 MG: 325 TABLET, FILM COATED ORAL at 11:49

## 2023-01-01 RX ADMIN — ACETAMINOPHEN 650 MG: 325 TABLET, FILM COATED ORAL at 08:01

## 2023-01-01 RX ADMIN — ONDANSETRON 4 MG: 2 INJECTION INTRAMUSCULAR; INTRAVENOUS at 12:21

## 2023-01-01 RX ADMIN — GUAIFENESIN AND DEXTROMETHORPHAN 10 ML: 100; 10 SYRUP ORAL at 08:36

## 2023-01-01 RX ADMIN — MIDODRINE HYDROCHLORIDE 10 MG: 5 TABLET ORAL at 07:32

## 2023-01-01 RX ADMIN — IPRATROPIUM BROMIDE AND ALBUTEROL SULFATE 3 ML: .5; 3 SOLUTION RESPIRATORY (INHALATION) at 20:03

## 2023-01-01 RX ADMIN — NITROGLYCERIN 25 MCG: 10 INJECTION INTRAVENOUS at 15:31

## 2023-01-01 RX ADMIN — ACETAMINOPHEN 650 MG: 650 SUPPOSITORY RECTAL at 09:34

## 2023-01-01 RX ADMIN — SODIUM CHLORIDE, POTASSIUM CHLORIDE, SODIUM LACTATE AND CALCIUM CHLORIDE: 600; 310; 30; 20 INJECTION, SOLUTION INTRAVENOUS at 14:29

## 2023-01-01 RX ADMIN — SPIRONOLACTONE 25 MG: 25 TABLET ORAL at 12:14

## 2023-01-01 RX ADMIN — ACETYLCYSTEINE 2 ML: 200 SOLUTION ORAL; RESPIRATORY (INHALATION) at 17:07

## 2023-01-01 RX ADMIN — PHENYLEPHRINE HYDROCHLORIDE 100 MCG: 10 INJECTION INTRAVENOUS at 08:25

## 2023-01-01 RX ADMIN — Medication 5 MG: at 20:35

## 2023-01-01 RX ADMIN — HEPARIN SODIUM 5000 UNITS: 5000 INJECTION, SOLUTION INTRAVENOUS; SUBCUTANEOUS at 19:58

## 2023-01-01 RX ADMIN — POTASSIUM PHOSPHATE, MONOBASIC POTASSIUM PHOSPHATE, DIBASIC 30 MMOL: 224; 236 INJECTION, SOLUTION, CONCENTRATE INTRAVENOUS at 09:48

## 2023-01-01 RX ADMIN — WARFARIN SODIUM 4 MG: 4 TABLET ORAL at 19:08

## 2023-01-01 RX ADMIN — ALBUTEROL SULFATE 2.5 MG: 2.5 SOLUTION RESPIRATORY (INHALATION) at 07:02

## 2023-01-01 RX ADMIN — PANTOPRAZOLE SODIUM 40 MG: 40 TABLET, DELAYED RELEASE ORAL at 08:20

## 2023-01-01 RX ADMIN — ALBUTEROL SULFATE 2.5 MG: 2.5 SOLUTION RESPIRATORY (INHALATION) at 08:09

## 2023-01-01 RX ADMIN — MIRTAZAPINE 7.5 MG: 7.5 TABLET, FILM COATED ORAL at 22:44

## 2023-01-01 RX ADMIN — HYDROXYZINE HYDROCHLORIDE 25 MG: 25 TABLET ORAL at 08:54

## 2023-01-01 RX ADMIN — HEPARIN SODIUM 5000 UNITS: 1000 INJECTION INTRAVENOUS; SUBCUTANEOUS at 10:37

## 2023-01-01 RX ADMIN — TORSEMIDE 40 MG: 20 TABLET ORAL at 09:00

## 2023-01-01 RX ADMIN — SIMETHICONE 40 MG: 20 EMULSION ORAL at 14:02

## 2023-01-01 RX ADMIN — ASPIRIN 81 MG: 81 TABLET, CHEWABLE ORAL at 08:29

## 2023-01-01 RX ADMIN — CHLORHEXIDINE GLUCONATE 15 ML: 1.2 RINSE ORAL at 19:54

## 2023-01-01 RX ADMIN — Medication 250 MG: at 20:44

## 2023-01-01 RX ADMIN — SODIUM CHLORIDE, POTASSIUM CHLORIDE, SODIUM LACTATE AND CALCIUM CHLORIDE 500 ML: 600; 310; 30; 20 INJECTION, SOLUTION INTRAVENOUS at 21:22

## 2023-01-01 RX ADMIN — MIRTAZAPINE 15 MG: 15 TABLET, FILM COATED ORAL at 21:03

## 2023-01-01 RX ADMIN — MIDODRINE HYDROCHLORIDE 10 MG: 5 TABLET ORAL at 00:51

## 2023-01-01 RX ADMIN — TORSEMIDE 20 MG: 20 TABLET ORAL at 12:44

## 2023-01-01 RX ADMIN — CHLORHEXIDINE GLUCONATE 15 ML: 1.2 RINSE ORAL at 20:38

## 2023-01-01 RX ADMIN — PANCRELIPASE LIPASE, PANCRELIPASE PROTEASE, PANCRELIPASE AMYLASE 1 CAPSULE: 5000; 17000; 24000 CAPSULE, DELAYED RELEASE ORAL at 02:54

## 2023-01-01 RX ADMIN — ALBUTEROL SULFATE 2.5 MG: 2.5 SOLUTION RESPIRATORY (INHALATION) at 07:45

## 2023-01-01 RX ADMIN — PIPERACILLIN AND TAZOBACTAM 3.38 G: 3; .375 INJECTION, POWDER, LYOPHILIZED, FOR SOLUTION INTRAVENOUS at 19:33

## 2023-01-01 RX ADMIN — ESCITALOPRAM OXALATE 10 MG: 10 TABLET ORAL at 07:53

## 2023-01-01 RX ADMIN — WARFARIN SODIUM 4 MG: 4 TABLET ORAL at 18:18

## 2023-01-01 RX ADMIN — GUAIFENESIN AND DEXTROMETHORPHAN 10 ML: 100; 10 SYRUP ORAL at 07:47

## 2023-01-01 RX ADMIN — ACETAMINOPHEN 650 MG: 325 TABLET, FILM COATED ORAL at 05:16

## 2023-01-01 RX ADMIN — ALBUTEROL SULFATE 2.5 MG: 2.5 SOLUTION RESPIRATORY (INHALATION) at 17:06

## 2023-01-01 RX ADMIN — ONDANSETRON 4 MG: 2 INJECTION INTRAMUSCULAR; INTRAVENOUS at 09:34

## 2023-01-01 RX ADMIN — SODIUM CHLORIDE, POTASSIUM CHLORIDE, SODIUM LACTATE AND CALCIUM CHLORIDE: 600; 310; 30; 20 INJECTION, SOLUTION INTRAVENOUS at 11:48

## 2023-01-01 RX ADMIN — ALBUTEROL SULFATE 2.5 MG: 2.5 SOLUTION RESPIRATORY (INHALATION) at 07:41

## 2023-01-01 RX ADMIN — ANORECTAL OINTMENT: 15.7; .44; 24; 20.6 OINTMENT TOPICAL at 08:14

## 2023-01-01 RX ADMIN — ACETAMINOPHEN 650 MG: 325 TABLET, FILM COATED ORAL at 02:55

## 2023-01-01 RX ADMIN — ACETAMINOPHEN 650 MG: 325 TABLET, FILM COATED ORAL at 11:47

## 2023-01-01 RX ADMIN — PIPERACILLIN AND TAZOBACTAM 3.38 G: 3; .375 INJECTION, POWDER, LYOPHILIZED, FOR SOLUTION INTRAVENOUS at 14:25

## 2023-01-01 RX ADMIN — PHENYLEPHRINE HYDROCHLORIDE 50 MCG: 10 INJECTION INTRAVENOUS at 15:41

## 2023-01-01 RX ADMIN — GUAIFENESIN AND DEXTROMETHORPHAN 10 ML: 100; 10 SYRUP ORAL at 17:14

## 2023-01-01 RX ADMIN — GUAIFENESIN AND DEXTROMETHORPHAN 10 ML: 100; 10 SYRUP ORAL at 19:54

## 2023-01-01 RX ADMIN — SODIUM CHLORIDE, POTASSIUM CHLORIDE, SODIUM LACTATE AND CALCIUM CHLORIDE 500 ML: 600; 310; 30; 20 INJECTION, SOLUTION INTRAVENOUS at 11:50

## 2023-01-01 RX ADMIN — ENOXAPARIN SODIUM 40 MG: 40 INJECTION SUBCUTANEOUS at 20:12

## 2023-01-01 RX ADMIN — ALBUTEROL SULFATE 2.5 MG: 2.5 SOLUTION RESPIRATORY (INHALATION) at 05:13

## 2023-01-01 RX ADMIN — MIRTAZAPINE 15 MG: 15 TABLET, FILM COATED ORAL at 20:05

## 2023-01-01 RX ADMIN — PIPERACILLIN AND TAZOBACTAM 3.38 G: 3; .375 INJECTION, POWDER, LYOPHILIZED, FOR SOLUTION INTRAVENOUS at 08:09

## 2023-01-01 RX ADMIN — DIPHENHYDRAMINE HCL 25 MG: 25 TABLET, COATED ORAL at 10:57

## 2023-01-01 RX ADMIN — ASPIRIN 81 MG CHEWABLE TABLET 81 MG: 81 TABLET CHEWABLE at 07:32

## 2023-01-01 RX ADMIN — POTASSIUM CHLORIDE 20 MEQ: 1.5 POWDER, FOR SOLUTION ORAL at 13:21

## 2023-01-01 RX ADMIN — SIMETHICONE 40 MG: 20 EMULSION ORAL at 22:08

## 2023-01-01 RX ADMIN — MIRTAZAPINE 15 MG: 15 TABLET, FILM COATED ORAL at 20:55

## 2023-01-01 RX ADMIN — ACETYLCYSTEINE 2 ML: 200 SOLUTION ORAL; RESPIRATORY (INHALATION) at 20:25

## 2023-01-01 RX ADMIN — IPRATROPIUM BROMIDE AND ALBUTEROL SULFATE 3 ML: .5; 3 SOLUTION RESPIRATORY (INHALATION) at 16:00

## 2023-01-01 RX ADMIN — ACETAMINOPHEN 650 MG: 325 TABLET, FILM COATED ORAL at 10:10

## 2023-01-01 RX ADMIN — Medication 5 MG: at 21:51

## 2023-01-01 RX ADMIN — ACETAMINOPHEN 650 MG: 325 TABLET, FILM COATED ORAL at 20:41

## 2023-01-01 RX ADMIN — ALBUTEROL SULFATE 2.5 MG: 2.5 SOLUTION RESPIRATORY (INHALATION) at 19:47

## 2023-01-01 RX ADMIN — ACETYLCYSTEINE 2 ML: 200 SOLUTION ORAL; RESPIRATORY (INHALATION) at 06:11

## 2023-01-01 RX ADMIN — ACETYLCYSTEINE 2 ML: 200 SOLUTION ORAL; RESPIRATORY (INHALATION) at 07:35

## 2023-01-01 RX ADMIN — PROPOFOL 30 MCG/KG/MIN: 10 INJECTION, EMULSION INTRAVENOUS at 19:54

## 2023-01-01 RX ADMIN — POTASSIUM CHLORIDE 20 MEQ: 1.5 POWDER, FOR SOLUTION ORAL at 19:54

## 2023-01-01 RX ADMIN — HEPARIN SODIUM 5000 UNITS: 5000 INJECTION, SOLUTION INTRAVENOUS; SUBCUTANEOUS at 20:34

## 2023-01-01 RX ADMIN — IPRATROPIUM BROMIDE AND ALBUTEROL SULFATE 3 ML: .5; 3 SOLUTION RESPIRATORY (INHALATION) at 20:13

## 2023-01-01 RX ADMIN — Medication 5 MG: at 21:03

## 2023-01-01 RX ADMIN — LORAZEPAM 0.5 MG: 2 INJECTION INTRAMUSCULAR; INTRAVENOUS at 17:35

## 2023-01-01 RX ADMIN — BUMETANIDE 1 MG: 1 TABLET ORAL at 08:29

## 2023-01-01 RX ADMIN — Medication 250 MG: at 20:05

## 2023-01-01 RX ADMIN — SIMETHICONE 40 MG: 20 EMULSION ORAL at 22:41

## 2023-01-01 RX ADMIN — MIRTAZAPINE 15 MG: 15 TABLET, FILM COATED ORAL at 20:04

## 2023-01-01 RX ADMIN — MULTIVIT AND MINERALS-FERROUS GLUCONATE 9 MG IRON/15 ML ORAL LIQUID 15 ML: at 09:25

## 2023-01-01 RX ADMIN — Medication 12.5 MG: at 19:33

## 2023-01-01 RX ADMIN — ACETYLCYSTEINE 2 ML: 200 SOLUTION ORAL; RESPIRATORY (INHALATION) at 07:34

## 2023-01-01 RX ADMIN — BUMETANIDE 4 MG: 0.25 INJECTION INTRAMUSCULAR; INTRAVENOUS at 08:29

## 2023-01-01 RX ADMIN — IPRATROPIUM BROMIDE AND ALBUTEROL SULFATE 3 ML: .5; 3 SOLUTION RESPIRATORY (INHALATION) at 00:09

## 2023-01-01 RX ADMIN — WARFARIN SODIUM 4 MG: 4 TABLET ORAL at 20:53

## 2023-01-01 RX ADMIN — HEPARIN SODIUM 5000 UNITS: 5000 INJECTION, SOLUTION INTRAVENOUS; SUBCUTANEOUS at 12:25

## 2023-01-01 RX ADMIN — MIDODRINE HYDROCHLORIDE 10 MG: 5 TABLET ORAL at 00:17

## 2023-01-01 RX ADMIN — PANCRELIPASE LIPASE, PANCRELIPASE PROTEASE, PANCRELIPASE AMYLASE 1 CAPSULE: 5000; 17000; 24000 CAPSULE, DELAYED RELEASE ORAL at 18:21

## 2023-01-01 RX ADMIN — MEROPENEM 1 G: 1 INJECTION, POWDER, FOR SOLUTION INTRAVENOUS at 20:29

## 2023-01-01 RX ADMIN — FUROSEMIDE 40 MG: 10 INJECTION, SOLUTION INTRAMUSCULAR; INTRAVENOUS at 10:17

## 2023-01-01 RX ADMIN — GLYCOPYRROLATE 0.1 MG: 0.2 INJECTION INTRAMUSCULAR; INTRAVENOUS at 12:20

## 2023-01-01 RX ADMIN — BUMETANIDE 3 MG: 0.25 INJECTION INTRAMUSCULAR; INTRAVENOUS at 19:32

## 2023-01-01 RX ADMIN — PROPOFOL 100 MCG/KG/MIN: 10 INJECTION, EMULSION INTRAVENOUS at 15:01

## 2023-01-01 RX ADMIN — BUMETANIDE 1 MG: 0.25 INJECTION INTRAMUSCULAR; INTRAVENOUS at 12:14

## 2023-01-01 RX ADMIN — ONDANSETRON 4 MG: 2 INJECTION INTRAMUSCULAR; INTRAVENOUS at 08:12

## 2023-01-01 RX ADMIN — CHLORHEXIDINE GLUCONATE 15 ML: 1.2 RINSE ORAL at 20:10

## 2023-01-01 RX ADMIN — POTASSIUM CHLORIDE 20 MEQ: 29.8 INJECTION, SOLUTION INTRAVENOUS at 23:49

## 2023-01-01 RX ADMIN — ACETYLCYSTEINE 2 ML: 200 SOLUTION ORAL; RESPIRATORY (INHALATION) at 12:27

## 2023-01-01 RX ADMIN — WARFARIN SODIUM 2 MG: 2 TABLET ORAL at 17:02

## 2023-01-01 RX ADMIN — ASPIRIN 300 MG: 300 SUPPOSITORY RECTAL at 11:02

## 2023-01-01 RX ADMIN — HEPARIN SODIUM 5000 UNITS: 5000 INJECTION, SOLUTION INTRAVENOUS; SUBCUTANEOUS at 22:24

## 2023-01-01 RX ADMIN — POTASSIUM CHLORIDE 20 MEQ: 1.5 POWDER, FOR SOLUTION ORAL at 07:59

## 2023-01-01 RX ADMIN — Medication 15 ML: at 08:47

## 2023-01-01 RX ADMIN — OXYCODONE HYDROCHLORIDE 2.5 MG: 5 TABLET ORAL at 03:51

## 2023-01-01 RX ADMIN — GUAIFENESIN AND DEXTROMETHORPHAN 10 ML: 100; 10 SYRUP ORAL at 08:30

## 2023-01-01 RX ADMIN — ASPIRIN 81 MG: 81 TABLET, CHEWABLE ORAL at 08:50

## 2023-01-01 RX ADMIN — ASPIRIN 81 MG: 81 TABLET, CHEWABLE ORAL at 08:22

## 2023-01-01 RX ADMIN — VANCOMYCIN HYDROCHLORIDE 125 MG: KIT at 20:40

## 2023-01-01 RX ADMIN — HYDROCORTISONE SODIUM SUCCINATE 50 MG: 100 INJECTION, POWDER, FOR SOLUTION INTRAMUSCULAR; INTRAVENOUS at 17:17

## 2023-01-01 RX ADMIN — HEPARIN SODIUM 5000 UNITS: 5000 INJECTION, SOLUTION INTRAVENOUS; SUBCUTANEOUS at 14:32

## 2023-01-01 RX ADMIN — INSULIN ASPART 1 UNITS: 100 INJECTION, SOLUTION INTRAVENOUS; SUBCUTANEOUS at 04:01

## 2023-01-01 RX ADMIN — SIMETHICONE 40 MG: 20 EMULSION ORAL at 09:53

## 2023-01-01 RX ADMIN — SIMETHICONE 40 MG: 20 EMULSION ORAL at 11:45

## 2023-01-01 RX ADMIN — ONDANSETRON 4 MG: 2 INJECTION INTRAMUSCULAR; INTRAVENOUS at 15:29

## 2023-01-01 RX ADMIN — Medication 250 MG: at 20:41

## 2023-01-01 RX ADMIN — GUAIFENESIN AND DEXTROMETHORPHAN 10 ML: 100; 10 SYRUP ORAL at 11:48

## 2023-01-01 RX ADMIN — ASPIRIN 81 MG: 81 TABLET, CHEWABLE ORAL at 08:25

## 2023-01-01 RX ADMIN — INSULIN ASPART 1 UNITS: 100 INJECTION, SOLUTION INTRAVENOUS; SUBCUTANEOUS at 20:23

## 2023-01-01 RX ADMIN — KETAMINE HYDROCHLORIDE 10 MG: 10 INJECTION INTRAMUSCULAR; INTRAVENOUS at 09:09

## 2023-01-01 RX ADMIN — Medication 250 MG: at 20:40

## 2023-01-01 RX ADMIN — SPIRONOLACTONE 25 MG: 25 TABLET ORAL at 09:01

## 2023-01-01 RX ADMIN — HYDROCORTISONE SODIUM SUCCINATE 50 MG: 100 INJECTION, POWDER, FOR SOLUTION INTRAMUSCULAR; INTRAVENOUS at 11:03

## 2023-01-01 RX ADMIN — CHLORHEXIDINE GLUCONATE 15 ML: 1.2 RINSE ORAL at 08:35

## 2023-01-01 RX ADMIN — IPRATROPIUM BROMIDE AND ALBUTEROL SULFATE 3 ML: .5; 3 SOLUTION RESPIRATORY (INHALATION) at 02:42

## 2023-01-01 RX ADMIN — VASOPRESSIN 2.4 UNITS/HR: 20 INJECTION, SOLUTION INTRAMUSCULAR; SUBCUTANEOUS at 19:55

## 2023-01-01 RX ADMIN — ALBUTEROL SULFATE 2.5 MG: 2.5 SOLUTION RESPIRATORY (INHALATION) at 13:26

## 2023-01-01 RX ADMIN — BARIUM SULFATE 20 ML: 400 SUSPENSION ORAL at 12:59

## 2023-01-01 RX ADMIN — POTASSIUM CHLORIDE 20 MEQ: 1.5 POWDER, FOR SOLUTION ORAL at 07:47

## 2023-01-01 RX ADMIN — ASPIRIN 81 MG CHEWABLE TABLET 81 MG: 81 TABLET CHEWABLE at 07:59

## 2023-01-01 RX ADMIN — ACETYLCYSTEINE 2 ML: 200 SOLUTION ORAL; RESPIRATORY (INHALATION) at 00:11

## 2023-01-01 RX ADMIN — MEROPENEM 500 MG: 500 INJECTION, POWDER, FOR SOLUTION INTRAVENOUS at 20:11

## 2023-01-01 RX ADMIN — ACETYLCYSTEINE 2 ML: 200 SOLUTION ORAL; RESPIRATORY (INHALATION) at 04:09

## 2023-01-01 RX ADMIN — CHLORHEXIDINE GLUCONATE 15 ML: 1.2 SOLUTION ORAL at 22:28

## 2023-01-01 RX ADMIN — Medication 2 MG: at 12:10

## 2023-01-01 RX ADMIN — HYDROMORPHONE HYDROCHLORIDE 0.2 MG: 0.2 INJECTION, SOLUTION INTRAMUSCULAR; INTRAVENOUS; SUBCUTANEOUS at 19:52

## 2023-01-01 RX ADMIN — Medication 5 MG: at 20:37

## 2023-01-01 RX ADMIN — MICONAZOLE NITRATE: 20 POWDER TOPICAL at 09:26

## 2023-01-01 RX ADMIN — POTASSIUM CHLORIDE 20 MEQ: 29.8 INJECTION, SOLUTION INTRAVENOUS at 04:22

## 2023-01-01 RX ADMIN — ALBUTEROL SULFATE 2.5 MG: 2.5 SOLUTION RESPIRATORY (INHALATION) at 19:50

## 2023-01-01 RX ADMIN — HEPARIN SODIUM 5000 UNITS: 10000 INJECTION, SOLUTION INTRAVENOUS; SUBCUTANEOUS at 05:16

## 2023-01-01 RX ADMIN — MICONAZOLE NITRATE: 20 POWDER TOPICAL at 20:46

## 2023-01-01 RX ADMIN — BUMETANIDE 1 MG: 0.25 INJECTION INTRAMUSCULAR; INTRAVENOUS at 12:49

## 2023-01-01 RX ADMIN — IPRATROPIUM BROMIDE AND ALBUTEROL SULFATE 3 ML: .5; 3 SOLUTION RESPIRATORY (INHALATION) at 15:42

## 2023-01-01 RX ADMIN — IPRATROPIUM BROMIDE AND ALBUTEROL SULFATE 3 ML: .5; 3 SOLUTION RESPIRATORY (INHALATION) at 08:33

## 2023-01-01 RX ADMIN — PHENYLEPHRINE HYDROCHLORIDE 100 MCG: 10 INJECTION INTRAVENOUS at 08:21

## 2023-01-01 RX ADMIN — ESCITALOPRAM OXALATE 10 MG: 10 TABLET ORAL at 08:33

## 2023-01-01 RX ADMIN — ASPIRIN 81 MG CHEWABLE TABLET 81 MG: 81 TABLET CHEWABLE at 07:47

## 2023-01-01 RX ADMIN — ALBUTEROL SULFATE 2.5 MG: 2.5 SOLUTION RESPIRATORY (INHALATION) at 20:08

## 2023-01-01 RX ADMIN — ALBUTEROL SULFATE 2.5 MG: 2.5 SOLUTION RESPIRATORY (INHALATION) at 07:54

## 2023-01-01 RX ADMIN — CHLORHEXIDINE GLUCONATE 0.12% ORAL RINSE 15 ML: 1.2 LIQUID ORAL at 21:10

## 2023-01-01 RX ADMIN — BUMETANIDE 1.5 MG: 0.25 INJECTION INTRAMUSCULAR; INTRAVENOUS at 23:41

## 2023-01-01 RX ADMIN — IPRATROPIUM BROMIDE AND ALBUTEROL SULFATE 3 ML: .5; 3 SOLUTION RESPIRATORY (INHALATION) at 12:48

## 2023-01-01 RX ADMIN — LOPERAMIDE HCL 2 MG: 1 SOLUTION ORAL at 08:29

## 2023-01-01 RX ADMIN — ACETYLCYSTEINE 2 ML: 200 SOLUTION ORAL; RESPIRATORY (INHALATION) at 04:42

## 2023-01-01 RX ADMIN — ACETAMINOPHEN 650 MG: 325 TABLET, FILM COATED ORAL at 13:22

## 2023-01-01 RX ADMIN — SPIRONOLACTONE 25 MG: 25 TABLET ORAL at 08:20

## 2023-01-01 RX ADMIN — HEPARIN SODIUM 5000 UNITS: 10000 INJECTION, SOLUTION INTRAVENOUS; SUBCUTANEOUS at 20:40

## 2023-01-01 RX ADMIN — MICONAZOLE NITRATE: 20 POWDER TOPICAL at 20:32

## 2023-01-01 RX ADMIN — GUAIFENESIN AND DEXTROMETHORPHAN 10 ML: 100; 10 SYRUP ORAL at 04:26

## 2023-01-01 RX ADMIN — HEPARIN SODIUM 5000 UNITS: 5000 INJECTION, SOLUTION INTRAVENOUS; SUBCUTANEOUS at 04:53

## 2023-01-01 RX ADMIN — FENTANYL CITRATE 25 MCG: 50 INJECTION, SOLUTION INTRAMUSCULAR; INTRAVENOUS at 12:54

## 2023-01-01 RX ADMIN — WARFARIN SODIUM 4 MG: 4 TABLET ORAL at 17:45

## 2023-01-01 RX ADMIN — ESCITALOPRAM 10 MG: 5 SOLUTION ORAL at 08:51

## 2023-01-01 RX ADMIN — ESCITALOPRAM OXALATE 10 MG: 10 TABLET ORAL at 07:56

## 2023-01-01 RX ADMIN — BUMETANIDE 1.5 MG: 0.25 INJECTION INTRAMUSCULAR; INTRAVENOUS at 13:07

## 2023-01-01 RX ADMIN — SENNOSIDES AND DOCUSATE SODIUM 1 TABLET: 8.6; 5 TABLET ORAL at 19:58

## 2023-01-01 RX ADMIN — Medication 250 MG: at 08:24

## 2023-01-01 RX ADMIN — ACETAMINOPHEN 650 MG: 650 SOLUTION ORAL at 04:13

## 2023-01-01 RX ADMIN — ACETYLCYSTEINE 2 ML: 200 SOLUTION ORAL; RESPIRATORY (INHALATION) at 11:36

## 2023-01-01 RX ADMIN — ALBUTEROL SULFATE 2.5 MG: 2.5 SOLUTION RESPIRATORY (INHALATION) at 13:13

## 2023-01-01 RX ADMIN — CHLORHEXIDINE GLUCONATE 0.12% ORAL RINSE 15 ML: 1.2 LIQUID ORAL at 08:00

## 2023-01-01 RX ADMIN — HYDROXYZINE HYDROCHLORIDE 25 MG: 25 TABLET ORAL at 10:05

## 2023-01-01 RX ADMIN — INSULIN ASPART 1 UNITS: 100 INJECTION, SOLUTION INTRAVENOUS; SUBCUTANEOUS at 00:08

## 2023-01-01 RX ADMIN — ASPIRIN 81 MG: 81 TABLET, CHEWABLE ORAL at 08:24

## 2023-01-01 RX ADMIN — Medication 2 MG: at 08:51

## 2023-01-01 RX ADMIN — WARFARIN SODIUM 3.5 MG: 3 TABLET ORAL at 18:18

## 2023-01-01 RX ADMIN — ALBUTEROL SULFATE 2.5 MG: 2.5 SOLUTION RESPIRATORY (INHALATION) at 15:27

## 2023-01-01 RX ADMIN — ACETYLCYSTEINE 2 ML: 200 SOLUTION ORAL; RESPIRATORY (INHALATION) at 15:39

## 2023-01-01 RX ADMIN — ACETYLCYSTEINE 2 ML: 200 SOLUTION ORAL; RESPIRATORY (INHALATION) at 16:28

## 2023-01-01 RX ADMIN — Medication 15 ML: at 08:35

## 2023-01-01 RX ADMIN — MULTIVIT AND MINERALS-FERROUS GLUCONATE 9 MG IRON/15 ML ORAL LIQUID 15 ML: at 09:00

## 2023-01-01 RX ADMIN — HEPARIN SODIUM 5000 UNITS: 5000 INJECTION, SOLUTION INTRAVENOUS; SUBCUTANEOUS at 12:57

## 2023-01-01 RX ADMIN — ALBUTEROL SULFATE 2.5 MG: 2.5 SOLUTION RESPIRATORY (INHALATION) at 13:22

## 2023-01-01 RX ADMIN — TORSEMIDE 10 MG: 10 TABLET ORAL at 14:32

## 2023-01-01 RX ADMIN — Medication 5 MG: at 19:52

## 2023-01-01 RX ADMIN — ALBUTEROL SULFATE 2.5 MG: 2.5 SOLUTION RESPIRATORY (INHALATION) at 08:13

## 2023-01-01 RX ADMIN — ESCITALOPRAM OXALATE 10 MG: 10 TABLET ORAL at 08:19

## 2023-01-01 RX ADMIN — ESCITALOPRAM OXALATE 10 MG: 10 TABLET ORAL at 08:28

## 2023-01-01 RX ADMIN — ALBUTEROL SULFATE 2.5 MG: 2.5 SOLUTION RESPIRATORY (INHALATION) at 20:57

## 2023-01-01 RX ADMIN — BUMETANIDE 1 MG: 0.25 INJECTION INTRAMUSCULAR; INTRAVENOUS at 23:24

## 2023-01-01 RX ADMIN — WARFARIN SODIUM 1.5 MG: 3 TABLET ORAL at 18:21

## 2023-01-01 RX ADMIN — CHLORHEXIDINE GLUCONATE 0.12% ORAL RINSE 15 ML: 1.2 LIQUID ORAL at 08:35

## 2023-01-01 RX ADMIN — ASPIRIN 300 MG: 300 SUPPOSITORY RECTAL at 09:34

## 2023-01-01 RX ADMIN — HYDROCORTISONE SODIUM SUCCINATE 50 MG: 100 INJECTION, POWDER, FOR SOLUTION INTRAMUSCULAR; INTRAVENOUS at 04:53

## 2023-01-01 RX ADMIN — TORSEMIDE 10 MG: 10 TABLET ORAL at 09:00

## 2023-01-01 RX ADMIN — SODIUM CHLORIDE, POTASSIUM CHLORIDE, SODIUM LACTATE AND CALCIUM CHLORIDE: 600; 310; 30; 20 INJECTION, SOLUTION INTRAVENOUS at 08:51

## 2023-01-01 RX ADMIN — ACETYLCYSTEINE 2 ML: 200 SOLUTION ORAL; RESPIRATORY (INHALATION) at 09:02

## 2023-01-01 RX ADMIN — GUAIFENESIN AND DEXTROMETHORPHAN 10 ML: 100; 10 SYRUP ORAL at 19:33

## 2023-01-01 RX ADMIN — MIRTAZAPINE 15 MG: 15 TABLET, FILM COATED ORAL at 20:41

## 2023-01-01 RX ADMIN — ACETYLCYSTEINE 2 ML: 200 SOLUTION ORAL; RESPIRATORY (INHALATION) at 00:42

## 2023-01-01 RX ADMIN — ALBUTEROL SULFATE 2 PUFF: 90 AEROSOL, METERED RESPIRATORY (INHALATION) at 10:36

## 2023-01-01 RX ADMIN — CHLORHEXIDINE GLUCONATE 15 ML: 1.2 RINSE ORAL at 20:52

## 2023-01-01 RX ADMIN — POTASSIUM CHLORIDE 20 MEQ: 1.5 SOLUTION ORAL at 20:31

## 2023-01-01 RX ADMIN — POTASSIUM CHLORIDE 20 MEQ: 29.8 INJECTION, SOLUTION INTRAVENOUS at 18:49

## 2023-01-01 RX ADMIN — POTASSIUM CHLORIDE 20 MEQ: 29.8 INJECTION, SOLUTION INTRAVENOUS at 17:29

## 2023-01-01 RX ADMIN — MIRTAZAPINE 15 MG: 15 TABLET, FILM COATED ORAL at 21:11

## 2023-01-01 RX ADMIN — ESCITALOPRAM 10 MG: 5 SOLUTION ORAL at 09:26

## 2023-01-01 RX ADMIN — ACETYLCYSTEINE 4 ML: 100 SOLUTION ORAL; RESPIRATORY (INHALATION) at 00:09

## 2023-01-01 RX ADMIN — ESCITALOPRAM OXALATE 10 MG: 10 TABLET ORAL at 08:07

## 2023-01-01 RX ADMIN — HEPARIN SODIUM 5000 UNITS: 5000 INJECTION, SOLUTION INTRAVENOUS; SUBCUTANEOUS at 21:03

## 2023-01-01 RX ADMIN — CHLOROTHIAZIDE SODIUM 500 MG: 500 INJECTION, POWDER, LYOPHILIZED, FOR SOLUTION INTRAVENOUS at 11:38

## 2023-01-01 RX ADMIN — VANCOMYCIN HYDROCHLORIDE 125 MG: KIT at 07:56

## 2023-01-01 RX ADMIN — NITROFURANTOIN (MONOHYDRATE/MACROCRYSTALS) 100 MG: 75; 25 CAPSULE ORAL at 08:51

## 2023-01-01 RX ADMIN — FAMOTIDINE 20 MG: 20 TABLET ORAL at 09:25

## 2023-01-01 RX ADMIN — ENOXAPARIN SODIUM 40 MG: 40 INJECTION SUBCUTANEOUS at 20:44

## 2023-01-01 RX ADMIN — ACETAMINOPHEN 650 MG: 325 TABLET, FILM COATED ORAL at 20:59

## 2023-01-01 RX ADMIN — Medication 2 MG: at 13:18

## 2023-01-01 RX ADMIN — Medication 5 MG: at 20:17

## 2023-01-01 RX ADMIN — CHLORHEXIDINE GLUCONATE 15 ML: 1.2 RINSE ORAL at 07:59

## 2023-01-01 RX ADMIN — MIRTAZAPINE 15 MG: 7.5 TABLET, FILM COATED ORAL at 22:27

## 2023-01-01 RX ADMIN — Medication 12.5 MG: at 20:59

## 2023-01-01 RX ADMIN — ACETAMINOPHEN 650 MG: 325 TABLET, FILM COATED ORAL at 01:30

## 2023-01-01 RX ADMIN — ALBUTEROL SULFATE 2.5 MG: 2.5 SOLUTION RESPIRATORY (INHALATION) at 13:15

## 2023-01-01 RX ADMIN — VANCOMYCIN HYDROCHLORIDE 125 MG: KIT at 21:05

## 2023-01-01 RX ADMIN — FUROSEMIDE 20 MG: 10 INJECTION, SOLUTION INTRAMUSCULAR; INTRAVENOUS at 18:39

## 2023-01-01 RX ADMIN — BUMETANIDE 1.5 MG: 0.25 INJECTION INTRAMUSCULAR; INTRAVENOUS at 23:28

## 2023-01-01 RX ADMIN — POTASSIUM CHLORIDE 20 MEQ: 29.8 INJECTION, SOLUTION INTRAVENOUS at 06:10

## 2023-01-01 RX ADMIN — BUMETANIDE 2 MG: 0.25 INJECTION INTRAMUSCULAR; INTRAVENOUS at 23:49

## 2023-01-01 RX ADMIN — Medication 250 MG: at 20:37

## 2023-01-01 RX ADMIN — MIDODRINE HYDROCHLORIDE 10 MG: 5 TABLET ORAL at 17:02

## 2023-01-01 RX ADMIN — PROTAMINE SULFATE 10 MG: 10 INJECTION, SOLUTION INTRAVENOUS at 18:07

## 2023-01-01 RX ADMIN — SODIUM CHLORIDE, SODIUM LACTATE, POTASSIUM CHLORIDE, CALCIUM CHLORIDE: 600; 310; 30; 20 INJECTION, SOLUTION INTRAVENOUS at 10:37

## 2023-01-01 RX ADMIN — FENTANYL CITRATE 100 MCG: 50 INJECTION INTRAMUSCULAR; INTRAVENOUS at 18:36

## 2023-01-01 RX ADMIN — ESCITALOPRAM OXALATE 10 MG: 10 TABLET ORAL at 08:08

## 2023-01-01 RX ADMIN — PROTAMINE SULFATE 10 MG: 10 INJECTION, SOLUTION INTRAVENOUS at 18:01

## 2023-01-01 RX ADMIN — ACETAMINOPHEN 650 MG: 325 TABLET, FILM COATED ORAL at 12:58

## 2023-01-01 RX ADMIN — SODIUM CHLORIDE 30 ML: 9 INJECTION, SOLUTION INTRAVENOUS at 06:53

## 2023-01-01 RX ADMIN — CHLORHEXIDINE GLUCONATE 0.12% ORAL RINSE 15 ML: 1.2 LIQUID ORAL at 20:44

## 2023-01-01 RX ADMIN — ESCITALOPRAM 10 MG: 5 SOLUTION ORAL at 08:02

## 2023-01-01 RX ADMIN — MICONAZOLE NITRATE: 20 POWDER TOPICAL at 20:50

## 2023-01-01 RX ADMIN — HEPARIN SODIUM 5000 UNITS: 10000 INJECTION, SOLUTION INTRAVENOUS; SUBCUTANEOUS at 14:05

## 2023-01-01 RX ADMIN — ANORECTAL OINTMENT: 15.7; .44; 24; 20.6 OINTMENT TOPICAL at 08:26

## 2023-01-01 RX ADMIN — CHLORHEXIDINE GLUCONATE 0.12% ORAL RINSE 15 ML: 1.2 LIQUID ORAL at 20:09

## 2023-01-01 RX ADMIN — IPRATROPIUM BROMIDE AND ALBUTEROL SULFATE 3 ML: .5; 3 SOLUTION RESPIRATORY (INHALATION) at 16:57

## 2023-01-01 RX ADMIN — PANCRELIPASE LIPASE, PANCRELIPASE PROTEASE, PANCRELIPASE AMYLASE 1 CAPSULE: 5000; 17000; 24000 CAPSULE, DELAYED RELEASE ORAL at 01:41

## 2023-01-01 RX ADMIN — ESCITALOPRAM 10 MG: 5 SOLUTION ORAL at 08:24

## 2023-01-01 RX ADMIN — MIRTAZAPINE 15 MG: 15 TABLET, FILM COATED ORAL at 20:40

## 2023-01-01 RX ADMIN — PIPERACILLIN AND TAZOBACTAM 3.38 G: 3; .375 INJECTION, POWDER, LYOPHILIZED, FOR SOLUTION INTRAVENOUS at 08:18

## 2023-01-01 RX ADMIN — WARFARIN SODIUM 0.5 MG: 1 TABLET ORAL at 17:15

## 2023-01-01 RX ADMIN — POTASSIUM CHLORIDE 10 MEQ: 7.46 INJECTION, SOLUTION INTRAVENOUS at 05:38

## 2023-01-01 RX ADMIN — CHLORHEXIDINE GLUCONATE 15 ML: 1.2 RINSE ORAL at 20:40

## 2023-01-01 RX ADMIN — ALBUTEROL SULFATE 2.5 MG: 2.5 SOLUTION RESPIRATORY (INHALATION) at 13:40

## 2023-01-01 RX ADMIN — BUMETANIDE 1.5 MG: 0.25 INJECTION INTRAMUSCULAR; INTRAVENOUS at 12:59

## 2023-01-01 RX ADMIN — MICONAZOLE NITRATE: 20 POWDER TOPICAL at 09:14

## 2023-01-01 RX ADMIN — MIDODRINE HYDROCHLORIDE 10 MG: 5 TABLET ORAL at 05:31

## 2023-01-01 RX ADMIN — ENOXAPARIN SODIUM 40 MG: 40 INJECTION SUBCUTANEOUS at 20:31

## 2023-01-01 RX ADMIN — ACETAMINOPHEN 650 MG: 325 TABLET, FILM COATED ORAL at 21:30

## 2023-01-01 RX ADMIN — MEROPENEM 500 MG: 500 INJECTION, POWDER, FOR SOLUTION INTRAVENOUS at 21:05

## 2023-01-01 RX ADMIN — ALBUTEROL SULFATE 2.5 MG: 2.5 SOLUTION RESPIRATORY (INHALATION) at 20:23

## 2023-01-01 RX ADMIN — CHLORHEXIDINE GLUCONATE 0.12% ORAL RINSE 15 ML: 1.2 LIQUID ORAL at 08:43

## 2023-01-01 RX ADMIN — CLINDAMYCIN PHOSPHATE 900 MG: 900 INJECTION, SOLUTION INTRAVENOUS at 14:42

## 2023-01-01 RX ADMIN — ACETYLCYSTEINE 2 ML: 200 SOLUTION ORAL; RESPIRATORY (INHALATION) at 19:54

## 2023-01-01 RX ADMIN — Medication 15 ML: at 08:03

## 2023-01-01 RX ADMIN — WARFARIN SODIUM 1 MG: 1 TABLET ORAL at 18:55

## 2023-01-01 RX ADMIN — CHLORHEXIDINE GLUCONATE 15 ML: 1.2 RINSE ORAL at 07:47

## 2023-01-01 RX ADMIN — ACETYLCYSTEINE 2 ML: 200 SOLUTION ORAL; RESPIRATORY (INHALATION) at 16:54

## 2023-01-01 RX ADMIN — CHLORHEXIDINE GLUCONATE 0.12% ORAL RINSE 15 ML: 1.2 LIQUID ORAL at 20:40

## 2023-01-01 RX ADMIN — ENOXAPARIN SODIUM 40 MG: 40 INJECTION SUBCUTANEOUS at 20:55

## 2023-01-01 RX ADMIN — CHLORHEXIDINE GLUCONATE 0.12% ORAL RINSE 15 ML: 1.2 LIQUID ORAL at 21:09

## 2023-01-01 RX ADMIN — Medication 2 MG: at 11:38

## 2023-01-01 RX ADMIN — ALBUTEROL SULFATE 2.5 MG: 2.5 SOLUTION RESPIRATORY (INHALATION) at 08:16

## 2023-01-01 RX ADMIN — Medication 250 MG: at 08:27

## 2023-01-01 RX ADMIN — DEXAMETHASONE SODIUM PHOSPHATE 4 MG: 4 INJECTION, SOLUTION INTRA-ARTICULAR; INTRALESIONAL; INTRAMUSCULAR; INTRAVENOUS; SOFT TISSUE at 14:42

## 2023-01-01 RX ADMIN — ACETYLCYSTEINE 2 ML: 200 SOLUTION ORAL; RESPIRATORY (INHALATION) at 03:25

## 2023-01-01 RX ADMIN — ACETYLCYSTEINE 2 ML: 200 SOLUTION ORAL; RESPIRATORY (INHALATION) at 23:31

## 2023-01-01 RX ADMIN — EPINEPHRINE 10 MCG: 1 INJECTION INTRAMUSCULAR; INTRAVENOUS; SUBCUTANEOUS at 17:54

## 2023-01-01 RX ADMIN — Medication 2 MG: at 09:03

## 2023-01-01 RX ADMIN — ESCITALOPRAM OXALATE 10 MG: 10 TABLET ORAL at 08:13

## 2023-01-01 RX ADMIN — FAMOTIDINE 20 MG: 20 TABLET ORAL at 08:00

## 2023-01-01 RX ADMIN — Medication 250 MG: at 19:52

## 2023-01-01 RX ADMIN — Medication 250 MG: at 07:56

## 2023-01-01 RX ADMIN — Medication 500 MG: at 03:55

## 2023-01-01 RX ADMIN — POTASSIUM CHLORIDE 20 MEQ: 1.5 POWDER, FOR SOLUTION ORAL at 20:59

## 2023-01-01 RX ADMIN — ESCITALOPRAM 10 MG: 5 SOLUTION ORAL at 08:25

## 2023-01-01 RX ADMIN — ACETYLCYSTEINE 2 ML: 200 SOLUTION ORAL; RESPIRATORY (INHALATION) at 07:39

## 2023-01-01 RX ADMIN — PROPOFOL 20 MG: 10 INJECTION, EMULSION INTRAVENOUS at 15:01

## 2023-01-01 RX ADMIN — MIDODRINE HYDROCHLORIDE 10 MG: 5 TABLET ORAL at 10:10

## 2023-01-01 RX ADMIN — PIPERACILLIN AND TAZOBACTAM 3.38 G: 3; .375 INJECTION, POWDER, LYOPHILIZED, FOR SOLUTION INTRAVENOUS at 08:35

## 2023-01-01 RX ADMIN — MIRTAZAPINE 15 MG: 15 TABLET, FILM COATED ORAL at 21:21

## 2023-01-01 RX ADMIN — GUAIFENESIN AND DEXTROMETHORPHAN 10 ML: 100; 10 SYRUP ORAL at 16:49

## 2023-01-01 RX ADMIN — Medication 12.5 MG: at 08:09

## 2023-01-01 RX ADMIN — CHLORHEXIDINE GLUCONATE 15 ML: 1.2 RINSE ORAL at 19:32

## 2023-01-01 RX ADMIN — ACETYLCYSTEINE 2 ML: 200 SOLUTION ORAL; RESPIRATORY (INHALATION) at 11:24

## 2023-01-01 RX ADMIN — FUROSEMIDE 30 MG: 10 INJECTION, SOLUTION INTRAVENOUS at 15:10

## 2023-01-01 RX ADMIN — ALBUTEROL SULFATE 2.5 MG: 2.5 SOLUTION RESPIRATORY (INHALATION) at 19:59

## 2023-01-01 RX ADMIN — GUAIFENESIN AND DEXTROMETHORPHAN 10 ML: 100; 10 SYRUP ORAL at 00:37

## 2023-01-01 RX ADMIN — HEPARIN SODIUM 5000 UNITS: 5000 INJECTION, SOLUTION INTRAVENOUS; SUBCUTANEOUS at 20:02

## 2023-01-01 RX ADMIN — Medication 5 MG: at 21:37

## 2023-01-01 RX ADMIN — ASPIRIN 81 MG: 81 TABLET, CHEWABLE ORAL at 08:46

## 2023-01-01 RX ADMIN — ALBUTEROL SULFATE 2 PUFF: 90 AEROSOL, METERED RESPIRATORY (INHALATION) at 23:04

## 2023-01-01 RX ADMIN — ACETYLCYSTEINE 2 ML: 200 SOLUTION ORAL; RESPIRATORY (INHALATION) at 07:28

## 2023-01-01 RX ADMIN — GUAIFENESIN AND DEXTROMETHORPHAN 10 ML: 100; 10 SYRUP ORAL at 20:12

## 2023-01-01 RX ADMIN — CHLORHEXIDINE GLUCONATE 0.12% ORAL RINSE 15 ML: 1.2 LIQUID ORAL at 20:31

## 2023-01-01 RX ADMIN — ENOXAPARIN SODIUM 40 MG: 40 INJECTION SUBCUTANEOUS at 21:31

## 2023-01-01 RX ADMIN — MICONAZOLE NITRATE: 20 POWDER TOPICAL at 08:34

## 2023-01-01 RX ADMIN — Medication 2 MG: at 08:21

## 2023-01-01 RX ADMIN — Medication 7 ML/HR: at 17:14

## 2023-01-01 RX ADMIN — Medication 10 MG: at 10:14

## 2023-01-01 ASSESSMENT — ENCOUNTER SYMPTOMS
HEARTBURN: 0
CHILLS: 0
SORE THROAT: 0
BREAST MASS: 0
MYALGIAS: 0
DIARRHEA: 0
SEIZURES: 0
CONSTIPATION: 0
SEIZURES: 0
NERVOUS/ANXIOUS: 1
DYSRHYTHMIAS: 1
DYSRHYTHMIAS: 1
ABDOMINAL PAIN: 0
EYE PAIN: 0
JOINT SWELLING: 0
DYSRHYTHMIAS: 1
DYSRHYTHMIAS: 1
DYSURIA: 0
COUGH: 1
HEADACHES: 0
DYSRHYTHMIAS: 1
HEMATOCHEZIA: 0
PALPITATIONS: 0
HEMATURIA: 0
DYSRHYTHMIAS: 1
SEIZURES: 0
FREQUENCY: 0
NAUSEA: 0
PARESTHESIAS: 0
SHORTNESS OF BREATH: 1
FEVER: 0
DYSRHYTHMIAS: 1
ARTHRALGIAS: 0
SEIZURES: 0
SEIZURES: 0
WEAKNESS: 0

## 2023-01-01 ASSESSMENT — ACTIVITIES OF DAILY LIVING (ADL)
ADLS_ACUITY_SCORE: 50
ADLS_ACUITY_SCORE: 39
ADLS_ACUITY_SCORE: 44
ADLS_ACUITY_SCORE: 33
ADLS_ACUITY_SCORE: 31
ADLS_ACUITY_SCORE: 32
ADLS_ACUITY_SCORE: 37
ADLS_ACUITY_SCORE: 24
ADLS_ACUITY_SCORE: 30
ADLS_ACUITY_SCORE: 32
ADLS_ACUITY_SCORE: 33
ADLS_ACUITY_SCORE: 46
ADLS_ACUITY_SCORE: 54
ADLS_ACUITY_SCORE: 24
ADLS_ACUITY_SCORE: 38
ADLS_ACUITY_SCORE: 32
ADLS_ACUITY_SCORE: 33
ADLS_ACUITY_SCORE: 50
ADLS_ACUITY_SCORE: 33
ADLS_ACUITY_SCORE: 31
ADLS_ACUITY_SCORE: 32
ADLS_ACUITY_SCORE: 38
ADLS_ACUITY_SCORE: 35
ADLS_ACUITY_SCORE: 54
ADLS_ACUITY_SCORE: 32
ADLS_ACUITY_SCORE: 31
ADLS_ACUITY_SCORE: 52
ADLS_ACUITY_SCORE: 31
ADLS_ACUITY_SCORE: 44
ADLS_ACUITY_SCORE: 54
ADLS_ACUITY_SCORE: 50
ADLS_ACUITY_SCORE: 35
ADLS_ACUITY_SCORE: 33
ADLS_ACUITY_SCORE: 54
ADLS_ACUITY_SCORE: 32
ADLS_ACUITY_SCORE: 24
ADLS_ACUITY_SCORE: 54
ADLS_ACUITY_SCORE: 32
ADLS_ACUITY_SCORE: 54
ADLS_ACUITY_SCORE: 46
ADLS_ACUITY_SCORE: 41
ADLS_ACUITY_SCORE: 54
ADLS_ACUITY_SCORE: 33
ADLS_ACUITY_SCORE: 26
ADLS_ACUITY_SCORE: 41
ADLS_ACUITY_SCORE: 31
ADLS_ACUITY_SCORE: 52
ADLS_ACUITY_SCORE: 56
ADLS_ACUITY_SCORE: 41
ADLS_ACUITY_SCORE: 50
ADLS_ACUITY_SCORE: 42
ADLS_ACUITY_SCORE: 54
ADLS_ACUITY_SCORE: 33
ADLS_ACUITY_SCORE: 38
ADLS_ACUITY_SCORE: 50
ADLS_ACUITY_SCORE: 30
ADLS_ACUITY_SCORE: 31
ADLS_ACUITY_SCORE: 52
ADLS_ACUITY_SCORE: 54
ADLS_ACUITY_SCORE: 41
ADLS_ACUITY_SCORE: 54
ADLS_ACUITY_SCORE: 26
ADLS_ACUITY_SCORE: 31
ADLS_ACUITY_SCORE: 42
ADLS_ACUITY_SCORE: 50
ADLS_ACUITY_SCORE: 32
ADLS_ACUITY_SCORE: 41
ADLS_ACUITY_SCORE: 41
ADLS_ACUITY_SCORE: 46
ADLS_ACUITY_SCORE: 48
ADLS_ACUITY_SCORE: 56
ADLS_ACUITY_SCORE: 52
ADLS_ACUITY_SCORE: 38
ADLS_ACUITY_SCORE: 32
ADLS_ACUITY_SCORE: 56
ADLS_ACUITY_SCORE: 30
ADLS_ACUITY_SCORE: 37
ADLS_ACUITY_SCORE: 30
ADLS_ACUITY_SCORE: 54
ADLS_ACUITY_SCORE: 30
ADLS_ACUITY_SCORE: 54
ADLS_ACUITY_SCORE: 41
PREVIOUS_RESPONSIBILITIES: MEAL PREP;LAUNDRY;SHOPPING;MEDICATION MANAGEMENT;DRIVING
ADLS_ACUITY_SCORE: 44
ADLS_ACUITY_SCORE: 54
ADLS_ACUITY_SCORE: 50
ADLS_ACUITY_SCORE: 54
ADLS_ACUITY_SCORE: 46
ADLS_ACUITY_SCORE: 37
ADLS_ACUITY_SCORE: 54
ADLS_ACUITY_SCORE: 50
ADLS_ACUITY_SCORE: 41
ADLS_ACUITY_SCORE: 42
ADLS_ACUITY_SCORE: 54
ADLS_ACUITY_SCORE: 54
ADLS_ACUITY_SCORE: 31
ADLS_ACUITY_SCORE: 32
ADLS_ACUITY_SCORE: 33
ADLS_ACUITY_SCORE: 46
ADLS_ACUITY_SCORE: 50
ADLS_ACUITY_SCORE: 35
ADLS_ACUITY_SCORE: 50
ADLS_ACUITY_SCORE: 37
ADLS_ACUITY_SCORE: 54
ADLS_ACUITY_SCORE: 54
ADLS_ACUITY_SCORE: 32
ADLS_ACUITY_SCORE: 50
ADLS_ACUITY_SCORE: 33
ADLS_ACUITY_SCORE: 52
ADLS_ACUITY_SCORE: 46
ADLS_ACUITY_SCORE: 50
ADLS_ACUITY_SCORE: 54
ADLS_ACUITY_SCORE: 33
ADLS_ACUITY_SCORE: 42
ADLS_ACUITY_SCORE: 31
ADLS_ACUITY_SCORE: 38
ADLS_ACUITY_SCORE: 22
ADLS_ACUITY_SCORE: 50
ADLS_ACUITY_SCORE: 37
ADLS_ACUITY_SCORE: 56
ADLS_ACUITY_SCORE: 52
ADLS_ACUITY_SCORE: 51
ADLS_ACUITY_SCORE: 54
ADLS_ACUITY_SCORE: 32
ADLS_ACUITY_SCORE: 52
ADLS_ACUITY_SCORE: 40
ADLS_ACUITY_SCORE: 50
ADLS_ACUITY_SCORE: 31
ADLS_ACUITY_SCORE: 30
ADLS_ACUITY_SCORE: 22
ADLS_ACUITY_SCORE: 54
ADLS_ACUITY_SCORE: 37
ADLS_ACUITY_SCORE: 33
ADLS_ACUITY_SCORE: 44
ADLS_ACUITY_SCORE: 54
ADLS_ACUITY_SCORE: 30
ADLS_ACUITY_SCORE: 52
ADLS_ACUITY_SCORE: 38
ADLS_ACUITY_SCORE: 50
ADLS_ACUITY_SCORE: 35
ADLS_ACUITY_SCORE: 52
ADLS_ACUITY_SCORE: 58
ADLS_ACUITY_SCORE: 31
ADLS_ACUITY_SCORE: 38
ADLS_ACUITY_SCORE: 33
ADLS_ACUITY_SCORE: 31
ADLS_ACUITY_SCORE: 46
FALL_HISTORY_WITHIN_LAST_SIX_MONTHS: NO
ADLS_ACUITY_SCORE: 30
ADLS_ACUITY_SCORE: 34
ADLS_ACUITY_SCORE: 41
ADLS_ACUITY_SCORE: 41
ADLS_ACUITY_SCORE: 42
ADLS_ACUITY_SCORE: 35
ADLS_ACUITY_SCORE: 33
ADLS_ACUITY_SCORE: 37
CURRENT_FUNCTION: NO ASSISTANCE NEEDED
ADLS_ACUITY_SCORE: 56
ADLS_ACUITY_SCORE: 38
ADLS_ACUITY_SCORE: 54
ADLS_ACUITY_SCORE: 44
ADLS_ACUITY_SCORE: 56
ADLS_ACUITY_SCORE: 29
ADLS_ACUITY_SCORE: 54
ADLS_ACUITY_SCORE: 37
CONCENTRATING,_REMEMBERING_OR_MAKING_DECISIONS_DIFFICULTY: NO
ADLS_ACUITY_SCORE: 35
ADLS_ACUITY_SCORE: 48
ADLS_ACUITY_SCORE: 32
ADLS_ACUITY_SCORE: 32
ADLS_ACUITY_SCORE: 24
ADLS_ACUITY_SCORE: 38
ADLS_ACUITY_SCORE: 44
ADLS_ACUITY_SCORE: 42
ADLS_ACUITY_SCORE: 31
ADLS_ACUITY_SCORE: 54
ADLS_ACUITY_SCORE: 54
ADLS_ACUITY_SCORE: 52
ADLS_ACUITY_SCORE: 56
ADLS_ACUITY_SCORE: 32
ADLS_ACUITY_SCORE: 41
CHANGE_IN_FUNCTIONAL_STATUS_SINCE_ONSET_OF_CURRENT_ILLNESS/INJURY: NO
ADLS_ACUITY_SCORE: 54
ADLS_ACUITY_SCORE: 50
ADLS_ACUITY_SCORE: 54
ADLS_ACUITY_SCORE: 54
ADLS_ACUITY_SCORE: 41
ADLS_ACUITY_SCORE: 35
ADLS_ACUITY_SCORE: 54
ADLS_ACUITY_SCORE: 60
ADLS_ACUITY_SCORE: 41
ADLS_ACUITY_SCORE: 37
ADLS_ACUITY_SCORE: 38
ADLS_ACUITY_SCORE: 37
ADLS_ACUITY_SCORE: 35
ADLS_ACUITY_SCORE: 31
ADLS_ACUITY_SCORE: 40
ADLS_ACUITY_SCORE: 54
DEPENDENT_IADLS:: INDEPENDENT
ADLS_ACUITY_SCORE: 37
ADLS_ACUITY_SCORE: 22
ADLS_ACUITY_SCORE: 50
ADLS_ACUITY_SCORE: 37
ADLS_ACUITY_SCORE: 38
ADLS_ACUITY_SCORE: 35
ADLS_ACUITY_SCORE: 31
ADLS_ACUITY_SCORE: 30
ADLS_ACUITY_SCORE: 54
ADLS_ACUITY_SCORE: 35
ADLS_ACUITY_SCORE: 33
ADLS_ACUITY_SCORE: 42
ADLS_ACUITY_SCORE: 56
ADLS_ACUITY_SCORE: 33
ADLS_ACUITY_SCORE: 38
ADLS_ACUITY_SCORE: 52
ADLS_ACUITY_SCORE: 54
ADLS_ACUITY_SCORE: 54
ADLS_ACUITY_SCORE: 32
ADLS_ACUITY_SCORE: 42
ADLS_ACUITY_SCORE: 52
ADLS_ACUITY_SCORE: 50
ADLS_ACUITY_SCORE: 54
ADLS_ACUITY_SCORE: 50
ADLS_ACUITY_SCORE: 31
ADLS_ACUITY_SCORE: 41
ADLS_ACUITY_SCORE: 56
ADLS_ACUITY_SCORE: 31
ADLS_ACUITY_SCORE: 31
ADLS_ACUITY_SCORE: 40
DEPENDENT_IADLS:: INDEPENDENT
ADLS_ACUITY_SCORE: 37
ADLS_ACUITY_SCORE: 50
ADLS_ACUITY_SCORE: 33
ADLS_ACUITY_SCORE: 56
ADLS_ACUITY_SCORE: 31
ADLS_ACUITY_SCORE: 52
ADLS_ACUITY_SCORE: 54
ADLS_ACUITY_SCORE: 32
ADLS_ACUITY_SCORE: 32
ADLS_ACUITY_SCORE: 56
ADLS_ACUITY_SCORE: 38
ADLS_ACUITY_SCORE: 42
ADLS_ACUITY_SCORE: 54
ADLS_ACUITY_SCORE: 18
ADLS_ACUITY_SCORE: 54
ADLS_ACUITY_SCORE: 52
ADLS_ACUITY_SCORE: 38
ADLS_ACUITY_SCORE: 34
ADLS_ACUITY_SCORE: 52
ADLS_ACUITY_SCORE: 50
ADLS_ACUITY_SCORE: 52
ADLS_ACUITY_SCORE: 42
ADLS_ACUITY_SCORE: 54
ADLS_ACUITY_SCORE: 50
ADLS_ACUITY_SCORE: 41
ADLS_ACUITY_SCORE: 30
ADLS_ACUITY_SCORE: 41
ADLS_ACUITY_SCORE: 50
ADLS_ACUITY_SCORE: 54
ADLS_ACUITY_SCORE: 54
ADLS_ACUITY_SCORE: 31
ADLS_ACUITY_SCORE: 56
ADLS_ACUITY_SCORE: 52
ADLS_ACUITY_SCORE: 41
ADLS_ACUITY_SCORE: 56
ADLS_ACUITY_SCORE: 33
ADLS_ACUITY_SCORE: 33
ADLS_ACUITY_SCORE: 50
ADLS_ACUITY_SCORE: 33
ADLS_ACUITY_SCORE: 32
ADLS_ACUITY_SCORE: 33
ADLS_ACUITY_SCORE: 33
ADLS_ACUITY_SCORE: 32
ADLS_ACUITY_SCORE: 35
ADLS_ACUITY_SCORE: 37
ADLS_ACUITY_SCORE: 33
ADLS_ACUITY_SCORE: 50
ADLS_ACUITY_SCORE: 50
ADLS_ACUITY_SCORE: 41
ADLS_ACUITY_SCORE: 50
ADLS_ACUITY_SCORE: 35
ADLS_ACUITY_SCORE: 38
ADLS_ACUITY_SCORE: 32
ADLS_ACUITY_SCORE: 42
ADLS_ACUITY_SCORE: 42
ADLS_ACUITY_SCORE: 35
ADLS_ACUITY_SCORE: 38
ADLS_ACUITY_SCORE: 38
DIFFICULTY_EATING/SWALLOWING: NO
ADLS_ACUITY_SCORE: 58
ADLS_ACUITY_SCORE: 50
ADLS_ACUITY_SCORE: 31
ADLS_ACUITY_SCORE: 52
ADLS_ACUITY_SCORE: 30
ADLS_ACUITY_SCORE: 52
ADLS_ACUITY_SCORE: 54
ADLS_ACUITY_SCORE: 54
ADLS_ACUITY_SCORE: 38
ADLS_ACUITY_SCORE: 50
ADLS_ACUITY_SCORE: 54
ADLS_ACUITY_SCORE: 31
ADLS_ACUITY_SCORE: 38
ADLS_ACUITY_SCORE: 32
ADLS_ACUITY_SCORE: 41
ADLS_ACUITY_SCORE: 41
ADLS_ACUITY_SCORE: 38
ADLS_ACUITY_SCORE: 46
ADLS_ACUITY_SCORE: 52
ADLS_ACUITY_SCORE: 52
ADLS_ACUITY_SCORE: 54
ADLS_ACUITY_SCORE: 50
ADLS_ACUITY_SCORE: 30
ADLS_ACUITY_SCORE: 35
ADLS_ACUITY_SCORE: 32
ADLS_ACUITY_SCORE: 50
ADLS_ACUITY_SCORE: 38
ADLS_ACUITY_SCORE: 41
ADLS_ACUITY_SCORE: 50
ADLS_ACUITY_SCORE: 50
ADLS_ACUITY_SCORE: 33
ADLS_ACUITY_SCORE: 54
ADLS_ACUITY_SCORE: 38
ADLS_ACUITY_SCORE: 38
ADLS_ACUITY_SCORE: 24
ADLS_ACUITY_SCORE: 50
ADLS_ACUITY_SCORE: 50
ADLS_ACUITY_SCORE: 48
ADLS_ACUITY_SCORE: 33
ADLS_ACUITY_SCORE: 60
ADLS_ACUITY_SCORE: 38
ADLS_ACUITY_SCORE: 50
ADLS_ACUITY_SCORE: 22
ADLS_ACUITY_SCORE: 50
ADLS_ACUITY_SCORE: 33
ADLS_ACUITY_SCORE: 41
ADLS_ACUITY_SCORE: 31
ADLS_ACUITY_SCORE: 54
ADLS_ACUITY_SCORE: 52
ADLS_ACUITY_SCORE: 54
ADLS_ACUITY_SCORE: 31
ADLS_ACUITY_SCORE: 31
ADLS_ACUITY_SCORE: 37
DOING_ERRANDS_INDEPENDENTLY_DIFFICULTY: NO
ADLS_ACUITY_SCORE: 41
ADLS_ACUITY_SCORE: 24
ADLS_ACUITY_SCORE: 33
ADLS_ACUITY_SCORE: 35
ADLS_ACUITY_SCORE: 31
ADLS_ACUITY_SCORE: 31
ADLS_ACUITY_SCORE: 33
WALKING_OR_CLIMBING_STAIRS_DIFFICULTY: YES
ADLS_ACUITY_SCORE: 34
ADLS_ACUITY_SCORE: 48
ADLS_ACUITY_SCORE: 33
ADLS_ACUITY_SCORE: 33
ADLS_ACUITY_SCORE: 30
ADLS_ACUITY_SCORE: 31
ADLS_ACUITY_SCORE: 41
ADLS_ACUITY_SCORE: 42
ADLS_ACUITY_SCORE: 33
ADLS_ACUITY_SCORE: 33
ADLS_ACUITY_SCORE: 42
WALKING_OR_CLIMBING_STAIRS: TRANSFERRING DIFFICULTY, REQUIRES EQUIPMENT
ADLS_ACUITY_SCORE: 31
ADLS_ACUITY_SCORE: 24
ADLS_ACUITY_SCORE: 32
ADLS_ACUITY_SCORE: 41
ADLS_ACUITY_SCORE: 50
ADLS_ACUITY_SCORE: 54
ADLS_ACUITY_SCORE: 37
ADLS_ACUITY_SCORE: 35
ADLS_ACUITY_SCORE: 33
ADLS_ACUITY_SCORE: 30
ADLS_ACUITY_SCORE: 40
ADLS_ACUITY_SCORE: 42
ADLS_ACUITY_SCORE: 41
ADLS_ACUITY_SCORE: 31
ADLS_ACUITY_SCORE: 54
ADLS_ACUITY_SCORE: 33
ADLS_ACUITY_SCORE: 33
ADLS_ACUITY_SCORE: 41
ADLS_ACUITY_SCORE: 50
ADLS_ACUITY_SCORE: 41
ADLS_ACUITY_SCORE: 54
ADLS_ACUITY_SCORE: 37
ADLS_ACUITY_SCORE: 41
ADLS_ACUITY_SCORE: 30
ADLS_ACUITY_SCORE: 38
DEPENDENT_IADLS:: CLEANING;COOKING;LAUNDRY;SHOPPING;MEAL PREPARATION;MEDICATION MANAGEMENT;MONEY MANAGEMENT
ADLS_ACUITY_SCORE: 50
ADLS_ACUITY_SCORE: 54
ADLS_ACUITY_SCORE: 54
ADLS_ACUITY_SCORE: 38
ADLS_ACUITY_SCORE: 37
ADLS_ACUITY_SCORE: 35
ADLS_ACUITY_SCORE: 41
ADLS_ACUITY_SCORE: 33
ADLS_ACUITY_SCORE: 52
EQUIPMENT_CURRENTLY_USED_AT_HOME: WALKER, ROLLING
ADLS_ACUITY_SCORE: 50
ADLS_ACUITY_SCORE: 38
ADLS_ACUITY_SCORE: 50
ADLS_ACUITY_SCORE: 54
ADLS_ACUITY_SCORE: 54
ADLS_ACUITY_SCORE: 58
ADLS_ACUITY_SCORE: 52
ADLS_ACUITY_SCORE: 30
ADLS_ACUITY_SCORE: 26
ADLS_ACUITY_SCORE: 50
ADLS_ACUITY_SCORE: 50
ADLS_ACUITY_SCORE: 42
ADLS_ACUITY_SCORE: 38
ADLS_ACUITY_SCORE: 37
ADLS_ACUITY_SCORE: 41
ADLS_ACUITY_SCORE: 41
ADLS_ACUITY_SCORE: 50
ADLS_ACUITY_SCORE: 35
ADLS_ACUITY_SCORE: 54
TOILETING_ISSUES: NO
ADLS_ACUITY_SCORE: 37
ADLS_ACUITY_SCORE: 29
ADLS_ACUITY_SCORE: 54
ADLS_ACUITY_SCORE: 54
ADLS_ACUITY_SCORE: 30
ADLS_ACUITY_SCORE: 50
ADLS_ACUITY_SCORE: 32
ADLS_ACUITY_SCORE: 31
ADLS_ACUITY_SCORE: 50
ADLS_ACUITY_SCORE: 58
ADLS_ACUITY_SCORE: 38
ADLS_ACUITY_SCORE: 35
ADLS_ACUITY_SCORE: 40
ADLS_ACUITY_SCORE: 52
ADLS_ACUITY_SCORE: 38
ADLS_ACUITY_SCORE: 38
ADLS_ACUITY_SCORE: 32
ADLS_ACUITY_SCORE: 41
ADLS_ACUITY_SCORE: 37
ADLS_ACUITY_SCORE: 38
ADLS_ACUITY_SCORE: 56
ADLS_ACUITY_SCORE: 54
DRESSING/BATHING_DIFFICULTY: NO
ADLS_ACUITY_SCORE: 32
ADLS_ACUITY_SCORE: 56
ADLS_ACUITY_SCORE: 35
ADLS_ACUITY_SCORE: 42
ADLS_ACUITY_SCORE: 50
ADLS_ACUITY_SCORE: 54
ADLS_ACUITY_SCORE: 38
ADLS_ACUITY_SCORE: 54
ADLS_ACUITY_SCORE: 41
ADLS_ACUITY_SCORE: 56
ADLS_ACUITY_SCORE: 41
ADLS_ACUITY_SCORE: 38
ADLS_ACUITY_SCORE: 54
ADLS_ACUITY_SCORE: 50
ADLS_ACUITY_SCORE: 37
ADLS_ACUITY_SCORE: 33
ADLS_ACUITY_SCORE: 41
ADLS_ACUITY_SCORE: 30
ADLS_ACUITY_SCORE: 44
ADLS_ACUITY_SCORE: 32
ADLS_ACUITY_SCORE: 46
ADLS_ACUITY_SCORE: 37
ADLS_ACUITY_SCORE: 52
ADLS_ACUITY_SCORE: 50
ADLS_ACUITY_SCORE: 54
ADLS_ACUITY_SCORE: 22
ADLS_ACUITY_SCORE: 41
ADLS_ACUITY_SCORE: 54

## 2023-01-01 ASSESSMENT — PAIN SCALES - GENERAL
PAINLEVEL: MODERATE PAIN (4)
PAINLEVEL: NO PAIN (0)

## 2023-01-01 ASSESSMENT — LIFESTYLE VARIABLES
TOBACCO_USE: 0

## 2023-01-01 ASSESSMENT — COPD QUESTIONNAIRES
COPD: 0

## 2023-01-01 ASSESSMENT — VISUAL ACUITY: OU: NORMAL ACUITY

## 2023-01-12 ENCOUNTER — LAB (OUTPATIENT)
Dept: LAB | Facility: CLINIC | Age: 83
End: 2023-01-12
Payer: COMMERCIAL

## 2023-01-12 ENCOUNTER — ANTICOAGULATION THERAPY VISIT (OUTPATIENT)
Dept: ANTICOAGULATION | Facility: CLINIC | Age: 83
End: 2023-01-12

## 2023-01-12 DIAGNOSIS — I48.20 CHRONIC ATRIAL FIBRILLATION (H): ICD-10-CM

## 2023-01-12 DIAGNOSIS — I50.32 CHRONIC DIASTOLIC CONGESTIVE HEART FAILURE (H): Primary | ICD-10-CM

## 2023-01-12 DIAGNOSIS — I48.20 CHRONIC ATRIAL FIBRILLATION (H): Primary | ICD-10-CM

## 2023-01-12 DIAGNOSIS — Z79.01 CURRENT USE OF LONG TERM ANTICOAGULATION: ICD-10-CM

## 2023-01-12 LAB — INR BLD: 2.2 (ref 0.9–1.1)

## 2023-01-12 PROCEDURE — 36416 COLLJ CAPILLARY BLOOD SPEC: CPT

## 2023-01-12 PROCEDURE — 85610 PROTHROMBIN TIME: CPT

## 2023-01-12 NOTE — PROGRESS NOTES
ANTICOAGULATION MANAGEMENT     Priya Funez 82 year old female is on warfarin with therapeutic INR result. (Goal INR 2.0-3.0)    Recent labs: (last 7 days)     01/12/23  1526   INR 2.2*       ASSESSMENT       Source(s): Chart Review    Previous INR was Therapeutic last 2(+) visits    Medication, diet, health changes since last INR chart reviewed; none identified           PLAN     Recommended plan for no diet, medication or health factor changes affecting INR     Dosing Instructions: Continue your current warfarin dose with next INR in 5 weeks       Summary  As of 1/12/2023    Full warfarin instructions:  2 mg every Fri; 4 mg all other days   Next INR check:  2/16/2023             Detailed voice message left for Priya with dosing instructions and follow up date.     Contact [phone]  to schedule and with any changes, questions or concerns.     Education provided:     Please call back if any changes to your diet, medications or how you've been taking warfarin    Plan made per [facility] anticoagulation protocol    Marcelino Palomares RN  Anticoagulation Clinic  1/12/2023    _______________________________________________________________________     Anticoagulation Episode Summary     Current INR goal:  2.0-3.0   TTR:  81.3 % (1 y)   Target end date:  Indefinite   Send INR reminders to:  [facility]    Indications    Chronic atrial fibrillation (H) [I48.20]  Current use of long term anticoagulation [Z79.01]           Comments:           Anticoagulation Care Providers     Provider Role Specialty Phone number    Sharmaine Burks MD Referring Internal Medicine [phone]

## 2023-01-12 NOTE — PROGRESS NOTES
[facility] Heart - CORE Clinic    Called patient to review jardiance start - JOHAN for call back.  Susie Elena RN on 1/12/2023 at 3:13 PM

## 2023-01-12 NOTE — PROGRESS NOTES
Orders placed [facility] as discussed during our OV in December. Will have the CORE team call patient to review again and note medication will be coming in the mail.    TAMRA Ryan [facility]  Pager: [phone]

## 2023-01-16 NOTE — PROGRESS NOTES
[facility] Heart-CORE Clinic    Reviewed with Priya that rx for Jardiance was sent to [facility] and provided her with their number so she could arrange dispense.    Confirmed follow-up for next week.     Discussed potential side effects and importance of good perineal hygiene.     Future Appointments   Date Time Provider Department Center   1/23/2023  9:45 AM HATCH LAB [facility]   1/26/2023 10:30 AM Xochitl Eden APRN CNP [facility] CLIN   3/14/2023 12:00 [facility]   3/16/2023  9:00 AM Sharmaine Burks MD [facility]     Raegan Maloney RN BSN   1:44 PM 01/16/23  CORE nurse line M-F 8a-4p: [phone]

## 2023-01-20 NOTE — TELEPHONE ENCOUNTER
ANTICOAGULATION MANAGEMENT:  Medication Refill    Anticoagulation Summary  As of 1/12/2023    Warfarin maintenance plan:  2 mg (2 mg x 1) every Fri; 4 mg (2 mg x 2) all other days   Next INR check:  2/16/2023   Target end date:  Indefinite    Indications    Chronic atrial fibrillation (H) [I48.20]  Current use of long term anticoagulation [Z79.01]             Anticoagulation Care Providers     Provider Role Specialty Phone number    Sharmaine Burks MD Referring Internal Medicine [phone]          Visit with referring provider/group within last year: Yes    ACC referral signed within last year: Yes    Priya meets all criteria for refill (current ACC referral, office visit with referring provider/group in last year, lab monitoring up to date or not exceeding 2 weeks overdue). Rx instructions and quantity supplied updated to match patient's current dosing plan. Warfarin 90 day supply with 1 refill granted per ACC protocol     Amara Villarreal RN  Anticoagulation Clinic

## 2023-01-26 NOTE — LETTER
1/26/2023    Sharmaine Burks MD  [address] [facility] [address]    RE: Priya Funez       Dear Colleague,     I had the pleasure of seeing Priya Funez in the [facility].  Cardiology Clinic Progress Note  Priya Funez MRN# [id number]   YOB: 1940 Age: 82 year old   Primary Cardiologist: Dr. Kwok  Reason for visit: CORE follow up            Assessment and Plan:   Priya Funez is a very pleasant 82 year old female here today for CORE follow up     1.  Chronic diastolic heart failure/HFpEF/right heart failure with severe tricuspid regurgitation:  LVEF 55-60%, RV moderately to severely dilated, RVIDD at base 5.7cm, mild decrease in RV systolic function and severe tricuspid regurgitation.               - NYHA class III, stage C              - Fluid status : close to euvolemic, goal weight ~ 155# on home scale              - Diuretic regimen : torsemide 40mg daily, additional 10-20mg PRN for weight gain and/or worsening HF symptoms.               - Aldosterone antagonist : spironolactone 25mg daily              - SGLT2i: jardiance 10mg daily   - ARNI : none at this time, could consider in the future if difficulties with heart failure.  2. Chronic atrial fibrillation s/p AV node ablation 1/2019 - stable, asymptomatic. Most recent device interrogation was stable in 3/2022.               - Continue routine follow up with EP and device clinic              - [id number] score 3 (age, female, HF)              - Continue warfarin for thromboembolic prophylaxis  3. Emergent aortic dissection repair 1/4/19  4. Hypertension - controlled, continue current medications  5. Obesity - counseled patient on weight loss strategies.  6. Mild mitral regurgitation  7. Severe tricuspid regurgitation    Patient seen today for CORE followup. She is doing well from a heart failure perspective, NYHA class III, stage C. She started Jardiance about 1 week ago and feels some improvement  in dyspnea/urination. She is compensated and euvolemic on exam. Recommend continuing current medications.     Recommend follow up with Dr. Kwok this spring with CTA prior to monitor aorta with history of ascending aorta dissection.     Will consider further testing with cardiac MRI in the future to further evaluate RV and TR, will plan to consider in 2023.     Changes today: none    Follow up plan:     Follow up with Dr. Kwok in 3 months with labs prior    CTA chest/abdomen/pelvis to evaluate aorta in setting of history of aorta dissection in 1/2019        History of Presenting Illness:    Priya Funez is a very pleasant 82 year old female with a history of HFpEF, right heart failure, atrial fibrillation s/p AV leo ablation with PPM implantation 1/11/2019, emergent aortic dissection repair 1/4/19, hypertension and obesity.      Patient presented in January with dissecting aortic aneurysm, she was hospitalized from 1/4/19-1/29/19. Patient had a complicated hospital course. There was prolonged ischemia to the SMA and right renal regions. She required tracheostomy placement and was discharged to [facility] on ventilatory support. Patients tracheostomy has been discontinued and she transitioned home the beginning of June.      Post hospitalization she was evaluated by Dr. Morales in July 2019 at which point patient was noted to be volume up on exam. Recommended increasing her torsemide to 20mg BID. Lymphedema clinic referral placed and CORE consult ordered. I first met patient for CORE enrollment 7/9/19. She has been following closely in CORE clinic since.      She underwent surveillance CT on 8/7/2019 for aortic dissection repair which was stable, showing similar appearance as post repair.      Last echocardiogram 6/2022 showed LVEF 55-60%, RV moderately to severely dilated, RVIDD at base 5.7cm, mild decrease in RV systolic function, severe TR with malcoaptation of the TV leaflet, IVC dilated. When  compared to prior echo from 6/2021, increased RV dilation and worsening TR.      She has continued to follow closely in CORE where we have tried to optimize her volume status. Spironolactone was started with some improvement. We have explored further optimization of the GDMT with consideration of SGLT2i and Entresto but she has declined due to expense. She is also has resistance to consideration of further medication changes.     During our last OV in December at which time she was doing well. Re-priced out Jardiance for 2023 at which time she noted it would be affordable. She started Jardiance in mid-January.     Patient is here today for CORE follow up.     Patient reports feeling good. Started Jardiance 1 week ago. Thinks she has noted some improvement since starting. Monitoring weights daily a home. States weight has been stable ~ 155#. Notes a little higher today as she has ate out 2 days in a row. Denies shortness of breath at rest. Some exertional dyspnea with longer walks, when walking at the mall. Will also get some exertional dyspnea when she is not using her walker. This is an improvement. Patient denies chest pain or chest tightness. Denies dizziness, lightheadedness or other presyncopal symptoms. Denies tachycardia or palpitations. She has been compliant with medications, now taking diuretics early in the morning.     Labs from yesterday showed stable renal function and electrolytes. Blood pressure 106/73 and HR 81 in clinic today.    Appetite good. Occasional meals out, notes requests no added salt. Still doing open arms meals, notes she doesn't like them much. Going to the mall 3-4 days a week to walk, typically walking 15 mins. Denies tobacco use. Occasionally will share a little beer with her .         Social History    , 4 children, 8 grandchildren, enjoys her gardens in the summer.   Social History     Socioeconomic History     Marital status:      Spouse name: Not on file      Number of children: Not on file     Years of education: Not on file     Highest education level: Not on file   Occupational History     Occupation: Retired - customer service   Tobacco Use     Smoking status: Never     Smokeless tobacco: Never   Substance and Sexual Activity     Alcohol use: Not Currently     Drug use: No     Sexual activity: Not on file   Other Topics Concern     Parent/sibling w/ CABG, MI or angioplasty before 65F 55M? Not Asked   Social History Narrative    .    Lives in home with . Fully independent.    4 adult children.    8 grandchildren.     Social Determinants of Health     Financial Resource Strain: Not on file   Food Insecurity: Not on file   Transportation Needs: Not on file   Physical Activity: Not on file   Stress: Not on file   Social Connections: Not on file   Intimate Partner Violence: Not on file   Housing Stability: Not on file            Review of Systems:   10 point ROS neg other than the symptoms noted above in the HPI.         Physical Exam:   Vitals: There were no vitals taken for this visit.   Wt Readings from Last 4 Encounters:   12/13/22 71.6 kg (157 lb 12.8 oz)   09/27/22 73 kg (161 lb)   07/26/22 66.7 kg (147 lb)   06/30/22 72.1 kg (158 lb 14.4 oz)     GEN: well nourished, in no acute distress.  HEENT:  Pupils equal, round. Sclerae nonicteric.   NECK: Supple, no masses appreciated.   C/V:  Regular rate and rhythm, no murmur, rub or gallop.    RESP: Respirations are unlabored. Clear to auscultation bilaterally without wheezing, rales, or rhonchi.  GI: Abdomen soft, nontender.  EXTREM: No LE edema.  NEURO: Alert and oriented, cooperative.  SKIN: Warm and dry       Data:     LIPID RESULTS:  Lab Results   Component Value Date    CHOL 134 06/14/2021    HDL 53 06/14/2021    LDL 67 06/14/2021    TRIG 71 06/14/2021     LIVER ENZYME RESULTS:  Lab Results   Component Value Date    AST 27 04/04/2019    ALT 22 06/14/2021     CBC RESULTS:  Lab Results   Component Value  Date    WBC 5.3 06/14/2021    RBC 4.33 06/14/2021    HGB 12.6 03/11/2022    HGB 12.8 06/14/2021    HCT 40.1 06/14/2021    MCV 93 06/14/2021    MCH 29.6 06/14/2021    MCHC 31.9 06/14/2021    RDW 13.5 06/14/2021     06/14/2021     BMP RESULTS:  Lab Results   Component Value Date     01/23/2023     06/23/2021    POTASSIUM 4.1 01/23/2023    POTASSIUM 3.8 12/13/2022    POTASSIUM 4.5 06/23/2021    CHLORIDE 97 (L) 01/23/2023    CHLORIDE 99 12/13/2022    CHLORIDE 104 06/23/2021    CO2 28 01/23/2023    CO2 33 (H) 12/13/2022    CO2 31 06/23/2021    ANIONGAP 12 01/23/2023    ANIONGAP 7 12/13/2022    ANIONGAP 3 06/23/2021    GLC 91 01/23/2023    GLC 84 12/13/2022    GLC 86 06/23/2021    BUN 21.4 01/23/2023    BUN 31 (H) 12/13/2022    BUN 30 06/23/2021    CR 1.22 (H) 01/23/2023    CR 1.29 (H) 06/23/2021    GFRESTIMATED 45 (L) 01/23/2023    GFRESTIMATED 39 (L) 06/23/2021    GFRESTBLACK 46 (L) 06/23/2021    BESS 9.6 01/23/2023    BESS 9.2 06/23/2021      A1C RESULTS:  Lab Results   Component Value Date    A1C 5.6 01/30/2019    A1C 5.3 01/04/2019     INR RESULTS:  Lab Results   Component Value Date    INR 2.2 (H) 01/12/2023    INR 2.6 (H) 12/15/2022    INR 2.43 (H) 03/11/2022    INR 2.30 (H) 07/09/2021    INR 2.40 (H) 06/10/2021            Medications     Current Outpatient Medications   Medication Sig Dispense Refill     Acetaminophen 325 MG CAPS Take 325-650 mg by mouth every 4 hours as needed       albuterol (PROAIR HFA/PROVENTIL HFA/VENTOLIN HFA) 108 (90 Base) MCG/ACT inhaler Inhale 2 puffs into the lungs every 6 hours as needed for shortness of breath / dyspnea or wheezing 18 g 0     aspirin (ASA) 81 MG EC tablet Take 81 mg by mouth every 24 hours       cholecalciferol 25 MCG (1000 UT) TABS Take 1,000 Units by mouth daily        cyanocobalamin (VITAMIN B-12) 100 MCG tablet Take 1,000 mcg by mouth daily        empagliflozin (JARDIANCE) 10 MG TABS tablet Take 1 tablet (10 mg) by mouth daily 90 tablet 1      escitalopram (LEXAPRO) 10 MG tablet Take 1 tablet (10 mg) by mouth daily 90 tablet 0     metoprolol tartrate (LOPRESSOR) 25 MG tablet Take 1 tablet (25 mg) by mouth 2 times daily 180 tablet 3     mirtazapine (REMERON) 15 MG tablet Take 1 tablet (15 mg) by mouth At Bedtime 90 tablet 1     spironolactone (ALDACTONE) 25 MG tablet Take 1 tablet (25 mg) by mouth daily 90 tablet 3     torsemide (DEMADEX) 10 MG tablet Take 4 tablets(40mg) by mouth every day 360 tablet 3     warfarin ANTICOAGULANT (COUMADIN) 2 MG tablet Take one tablet (2 mg) by mouth on Fridays; take two tablets (4 mg) all other days; or as instructed by ACC team. 170 tablet 1          Past Medical History     Past Medical History:   Diagnosis Date     Benign essential hypertension      Chronic atrial fibrillation (H)     s/p AV node ablation January, 2019     Chronic diastolic heart failure (H)      Chronic kidney disease, stage III (moderate) (H)      Past Surgical History:   Procedure Laterality Date     ANGIOGRAM Right 01/04/2019    Procedure: DIAGONSTIC ANGIOGRAM OF SMA;  Surgeon: Rigo Jennings MD;  Location:  OR     BIOPSY BREAST       BYPASS GRAFT ARTERY CORONARY, REPAIR VALVE AORTIC, COMBINED N/A 01/04/2019    Procedure: AORTIC DISSECTION REPAIR WITH GRAFT- HEMASHIELD PLATINUM WOVEN DOUBLE VELOR 4 SIDE ARM VASCULAR GRAFT, D:35 X 12 X 10 X 10MM/ L:50CM;  Surgeon: Jose Winslow MD;  Location:  OR     EP ABLATION AV NODE N/A 01/11/2019    Procedure: EP Ablation AV Node;  Surgeon: Tsering Davey MD;  Location:  HEART CARDIAC CATH LAB     EP PACEMAKER Left 01/11/2019    Procedure: EP Pacemaker;  Surgeon: Tsering Davey MD;  Location:  HEART [facility]ARD[facility] CATH LAB     THYROID SURGERY      benign mass removed     TRACHEOSTOMY N/A 01/25/2019    Procedure: TRACHEOSTOMY  (DR MCCLELLAND);  Surgeon: Bryson Mcclelland MD;  Location: [facility] OR     TUBAL LIGATION       Family History   Problem Relation Age of Onset     Diabetes No family hx of       Myocardial Infarction No family hx of      Cerebrovascular Disease No family hx of      Coronary Artery Disease Early Onset No family hx of      Breast Cancer No family hx of      Colon Cancer No family hx of      Ovarian Cancer No family hx of      Ataxia Mother      Heart Disease Father             Allergies   Amoxicillin and Ciprofloxacin    40 minutes spent on the date of the encounter doing chart review, history and exam, documentation and further activities as noted above      TAMRA Ryan CNP  [facility]  Pager: [phone]    Thank you for allowing me to participate in the care of your patient.      Sincerely,     TAMRA Ryan CNP     [facility]  cc:   TAMRA Andres CNP  [address]  [place] [id number]

## 2023-01-26 NOTE — PATIENT INSTRUCTIONS
No medication changes  Follow up with Dr. Kwok in 3 months with labs and CT scan prior (look at aorta)  Please call with any questions/concerns [phone]  Component      Latest Ref Rng & Units 12/13/2022 1/23/2023   Sodium      136 - 145 mmol/L 139 137   Potassium      3.4 - 5.3 mmol/L 3.8    Chloride      94 - 109 mmol/L 99    Carbon Dioxide      20 - 32 mmol/L 33 (H)    Anion Gap      7 - 15 mmol/L 7 12   Urea Nitrogen      7 - 30 mg/dL 31 (H)    Creatinine      0.51 - 0.95 mg/dL 1.24 (H) 1.22 (H)   Calcium      8.8 - 10.2 mg/dL 9.4 9.6   Glucose      70 - 99 mg/dL 84    GFR Estimate      >60 mL/min/1.73m2 44 (L) 45 (L)   Potassium      3.4 - 5.3 mmol/L  4.1   Chloride      98 - 107 mmol/L  97 (L)   Carbon Dioxide (CO2)      22 - 29 mmol/L  28   Urea Nitrogen      8.0 - 23.0 mg/dL  21.4   Glucose      70 - 99 mg/dL  91

## 2023-01-26 NOTE — PROGRESS NOTES
Cardiology Clinic Progress Note  Priya Funez MRN# [id number]   YOB: 1940 Age: 82 year old   Primary Cardiologist: Dr. Kwok  Reason for visit: CORE follow up            Assessment and Plan:   Priya Funez is a very pleasant 82 year old female here today for CORE follow up     1.  Chronic diastolic heart failure/HFpEF/right heart failure with severe tricuspid regurgitation:  LVEF 55-60%, RV moderately to severely dilated, RVIDD at base 5.7cm, mild decrease in RV systolic function and severe tricuspid regurgitation.               - NYHA class III, stage C              - Fluid status : close to euvolemic, goal weight ~ 155# on home scale              - Diuretic regimen : torsemide 40mg daily, additional 10-20mg PRN for weight gain and/or worsening HF symptoms.               - Aldosterone antagonist : spironolactone 25mg daily              - SGLT2i: jardiance 10mg daily   - ARNI : none at this time, could consider in the future if difficulties with heart failure.  2. Chronic atrial fibrillation s/p AV node ablation 1/2019 - stable, asymptomatic. Most recent device interrogation was stable in 3/2022.               - Continue routine follow up with EP and device clinic              - [id number] score 3 (age, female, HF)              - Continue warfarin for thromboembolic prophylaxis  3. Emergent aortic dissection repair 1/4/19  4. Hypertension - controlled, continue current medications  5. Obesity - counseled patient on weight loss strategies.  6. Mild mitral regurgitation  7. Severe tricuspid regurgitation    Patient seen today for CORE followup. She is doing well from a heart failure perspective, NYHA class III, stage C. She started Jardiance about 1 week ago and feels some improvement in dyspnea/urination. She is compensated and euvolemic on exam. Recommend continuing current medications.     Recommend follow up with Dr. Kwok this spring with CTA prior to monitor aorta with history of  ascending aorta dissection.     Will consider further testing with cardiac MRI in the future to further evaluate RV and TR, will plan to consider in 2023.     Changes today: none    Follow up plan:     Follow up with Dr. Kwok in 3 months with labs prior    CTA chest/abdomen/pelvis to evaluate aorta in setting of history of aorta dissection in 1/2019        History of Presenting Illness:    Priya Funez is a very pleasant 82 year old female with a history of HFpEF, right heart failure, atrial fibrillation s/p AV leo ablation with PPM implantation 1/11/2019, emergent aortic dissection repair 1/4/19, hypertension and obesity.      Patient presented in January with dissecting aortic aneurysm, she was hospitalized from 1/4/19-1/29/19. Patient had a complicated hospital course. There was prolonged ischemia to the SMA and right renal regions. She required tracheostomy placement and was discharged to [place] on ventilatory support. Patients tracheostomy has been discontinued and she transitioned home the beginning of June.      Post hospitalization she was evaluated by Dr. Morales in July 2019 at which point patient was noted to be volume up on exam. Recommended increasing her torsemide to 20mg BID. Lymphedema clinic referral placed and CORE consult ordered. I first met patient for CORE enrollment 7/9/19. She has been following closely in CORE clinic since.      She underwent surveillance CT on 8/7/2019 for aortic dissection repair which was stable, showing similar appearance as post repair.      Last echocardiogram 6/2022 showed LVEF 55-60%, RV moderately to severely dilated, RVIDD at base 5.7cm, mild decrease in RV systolic function, severe TR with malcoaptation of the TV leaflet, IVC dilated. When compared to prior echo from 6/2021, increased RV dilation and worsening TR.      She has continued to follow closely in CORE where we have tried to optimize her volume status. Spironolactone was started with some  improvement. We have explored further optimization of the GDMT with consideration of SGLT2i and Entresto but she has declined due to expense. She is also has resistance to consideration of further medication changes.     During our last OV in December at which time she was doing well. Re-priced out Jardiance for 2023 at which time she noted it would be affordable. She started Jardiance in mid-January.     Patient is here today for CORE follow up.     Patient reports feeling good. Started Jardiance 1 week ago. Thinks she has noted some improvement since starting. Monitoring weights daily a home. States weight has been stable ~ 155#. Notes a little higher today as she has ate out 2 days in a row. Denies shortness of breath at rest. Some exertional dyspnea with longer walks, when walking at the mall. Will also get some exertional dyspnea when she is not using her walker. This is an improvement. Patient denies chest pain or chest tightness. Denies dizziness, lightheadedness or other presyncopal symptoms. Denies tachycardia or palpitations. She has been compliant with medications, now taking diuretics early in the morning.     Labs from yesterday showed stable renal function and electrolytes. Blood pressure 106/73 and HR 81 in clinic today.    Appetite good. Occasional meals out, notes requests no added salt. Still doing open arms meals, notes she doesn't like them much. Going to the mall 3-4 days a week to walk, typically walking 15 mins. Denies tobacco use. Occasionally will share a little beer with her .         Social History    , 4 children, 8 grandchildren, enjoys her gardens in the summer.   Social History     Socioeconomic History     Marital status:      Spouse name: Not on file     Number of children: Not on file     Years of education: Not on file     Highest education level: Not on file   Occupational History     Occupation: Retired - customer service   Tobacco Use     Smoking status:  Never     Smokeless tobacco: Never   Substance and Sexual Activity     Alcohol use: Not Currently     Drug use: No     Sexual activity: Not on file   Other Topics Concern     Parent/sibling w/ CABG, MI or angioplasty before 65F 55M? Not Asked   Social History Narrative    .    Lives in home with . Fully independent.    4 adult children.    8 grandchildren.     Social Determinants of Health     Financial Resource Strain: Not on file   Food Insecurity: Not on file   Transportation Needs: Not on file   Physical Activity: Not on file   Stress: Not on file   Social Connections: Not on file   Intimate Partner Violence: Not on file   Housing Stability: Not on file            Review of Systems:   10 point ROS neg other than the symptoms noted above in the HPI.         Physical Exam:   Vitals: There were no vitals taken for this visit.   Wt Readings from Last 4 Encounters:   12/13/22 71.6 kg (157 lb 12.8 oz)   09/27/22 73 kg (161 lb)   07/26/22 66.7 kg (147 lb)   06/30/22 72.1 kg (158 lb 14.4 oz)     GEN: well nourished, in no acute distress.  HEENT:  Pupils equal, round. Sclerae nonicteric.   NECK: Supple, no masses appreciated.   C/V:  Regular rate and rhythm, no murmur, rub or gallop.    RESP: Respirations are unlabored. Clear to auscultation bilaterally without wheezing, rales, or rhonchi.  GI: Abdomen soft, nontender.  EXTREM: No LE edema.  NEURO: Alert and oriented, cooperative.  SKIN: Warm and dry       Data:     LIPID RESULTS:  Lab Results   Component Value Date    CHOL 134 06/14/2021    HDL 53 06/14/2021    LDL 67 06/14/2021    TRIG 71 06/14/2021     LIVER ENZYME RESULTS:  Lab Results   Component Value Date    AST 27 04/04/2019    ALT 22 06/14/2021     CBC RESULTS:  Lab Results   Component Value Date    WBC 5.3 06/14/2021    RBC 4.33 06/14/2021    HGB 12.6 03/11/2022    HGB 12.8 06/14/2021    HCT 40.1 06/14/2021    MCV 93 06/14/2021    MCH 29.6 06/14/2021    MCHC 31.9 06/14/2021    RDW 13.5 06/14/2021      06/14/2021     BMP RESULTS:  Lab Results   Component Value Date     01/23/2023     06/23/2021    POTASSIUM 4.1 01/23/2023    POTASSIUM 3.8 12/13/2022    POTASSIUM 4.5 06/23/2021    CHLORIDE 97 (L) 01/23/2023    CHLORIDE 99 12/13/2022    CHLORIDE 104 06/23/2021    CO2 28 01/23/2023    CO2 33 (H) 12/13/2022    CO2 31 06/23/2021    ANIONGAP 12 01/23/2023    ANIONGAP 7 12/13/2022    ANIONGAP 3 06/23/2021    GLC 91 01/23/2023    GLC 84 12/13/2022    GLC 86 06/23/2021    BUN 21.4 01/23/2023    BUN 31 (H) 12/13/2022    BUN 30 06/23/2021    CR 1.22 (H) 01/23/2023    CR 1.29 (H) 06/23/2021    GFRESTIMATED 45 (L) 01/23/2023    GFRESTIMATED 39 (L) 06/23/2021    GFRESTBLACK 46 (L) 06/23/2021    BESS 9.6 01/23/2023    BESS 9.2 06/23/2021      A1C RESULTS:  Lab Results   Component Value Date    A1C 5.6 01/30/2019    A1C 5.3 01/04/2019     INR RESULTS:  Lab Results   Component Value Date    INR 2.2 (H) 01/12/2023    INR 2.6 (H) 12/15/2022    INR 2.43 (H) 03/11/2022    INR 2.30 (H) 07/09/2021    INR 2.40 (H) 06/10/2021            Medications     Current Outpatient Medications   Medication Sig Dispense Refill     Acetaminophen 325 MG CAPS Take 325-650 mg by mouth every 4 hours as needed       albuterol (PROAIR HFA/PROVENTIL HFA/VENTOLIN HFA) 108 (90 Base) MCG/ACT inhaler Inhale 2 puffs into the lungs every 6 hours as needed for shortness of breath / dyspnea or wheezing 18 g 0     aspirin (ASA) 81 MG EC tablet Take 81 mg by mouth every 24 hours       cholecalciferol 25 MCG (1000 UT) TABS Take 1,000 Units by mouth daily        cyanocobalamin (VITAMIN B-12) 100 MCG tablet Take 1,000 mcg by mouth daily        empagliflozin (JARDIANCE) 10 MG TABS tablet Take 1 tablet (10 mg) by mouth daily 90 tablet 1     escitalopram (LEXAPRO) 10 MG tablet Take 1 tablet (10 mg) by mouth daily 90 tablet 0     metoprolol tartrate (LOPRESSOR) 25 MG tablet Take 1 tablet (25 mg) by mouth 2 times daily 180 tablet 3     mirtazapine  (REMERON) 15 MG tablet Take 1 tablet (15 mg) by mouth At Bedtime 90 tablet 1     spironolactone (ALDACTONE) 25 MG tablet Take 1 tablet (25 mg) by mouth daily 90 tablet 3     torsemide (DEMADEX) 10 MG tablet Take 4 tablets(40mg) by mouth every day 360 tablet 3     warfarin ANTICOAGULANT (COUMADIN) 2 MG tablet Take one tablet (2 mg) by mouth on Fridays; take two tablets (4 mg) all other days; or as instructed by ACC team. 170 tablet 1          Past Medical History     Past Medical History:   Diagnosis Date     Benign essential hypertension      Chronic atrial fibrillation (H)     s/p AV node ablation January, 2019     Chronic diastolic heart failure (H)      Chronic kidney disease, stage III (moderate) (H)      Past Surgical History:   Procedure Laterality Date     ANGIOGRAM Right 01/04/2019    Procedure: DIAGONSTIC ANGIOGRAM OF SMA;  Surgeon: Rigo Jennings MD;  Location:  OR     BIOPSY BREAST       BYPASS GRAFT ARTERY CORONARY, REPAIR VALVE AORTIC, COMBINED N/A 01/04/2019    Procedure: AORTIC DISSECTION REPAIR WITH GRAFT- HEMASHIELD PLATINUM WOVEN DOUBLE VELOR 4 SIDE ARM VASCULAR GRAFT, D:35 X 12 X 10 X 10MM/ L:50CM;  Surgeon: Jose Winslow MD;  Location: [facility] OR     EP ABLATION AV NODE N/A 01/11/2019    Procedure: EP Ablation AV Node;  Surgeon: Tsering Davey MD;  Location:  HEART CARDIAC CATH LAB     EP PACEMAKER Left 01/11/2019    Procedure: EP Pacemaker;  Surgeon: Tsering Davey MD;  Location:  HEART CARDIAC CATH LAB     THYROID SURGERY      benign mass removed     TRACHEOSTOMY N/A 01/25/2019    Procedure: TRACHEOSTOMY  (DR MCCLELLAND);  Surgeon: Bryson Mcclelland MD;  Location:  OR     TUBAL LIGATION       Family History   Problem Relation Age of Onset     Diabetes No family hx of      Myocardial Infarction No family hx of      Cerebrovascular Disease No family hx of      Coronary Artery Disease Early Onset No family hx of      Breast Cancer No family hx of      Colon Cancer No family hx  of      Ovarian Cancer No family hx of      Ataxia Mother      Heart Disease Father             Allergies   Amoxicillin and Ciprofloxacin    40 minutes spent on the date of the encounter doing chart review, history and exam, documentation and further activities as noted above      TAMRA Ryan CNP  [facility] HEART CARE  Pager: [phone]

## 2023-02-10 NOTE — PROGRESS NOTES
ANTICOAGULATION MANAGEMENT     Priya MARISOL Funez 82 year old female is on warfarin with therapeutic INR result. (Goal INR 2.0-3.0)    Recent labs: (last 7 days)     02/10/23  1512   INR 2.6*       ASSESSMENT       Source(s): Chart Review and Patient/Caregiver Call       Warfarin doses taken: Warfarin taken as instructed    Diet: No new diet changes identified    New illness, injury, or hospitalization: No    Medication/supplement changes: None noted    Signs or symptoms of bleeding or clotting: No    Previous INR: Therapeutic last 2(+) visits    Additional findings: None       PLAN     Recommended plan for no diet, medication or health factor changes affecting INR     Dosing Instructions: Continue your current warfarin dose with next INR in 5 weeks       Summary  As of 2/10/2023    Full warfarin instructions:  2 mg every Fri; 4 mg all other days   Next INR check:  3/24/2023             Telephone call with Priya who verbalizes understanding and agrees to plan    Lab visit scheduled    Education provided:     Please call back if any changes to your diet, medications or how you've been taking warfarin    Plan made per [facility] anticoagulation protocol    Ana Alvarado RN  Anticoagulation Clinic  2/10/2023    _______________________________________________________________________     Anticoagulation Episode Summary     Current INR goal:  2.0-3.0   TTR:  81.3 % (1 y)   Target end date:  Indefinite   Send INR reminders to:  [facility]    Indications    Chronic atrial fibrillation (H) [I48.20]  Current use of long term anticoagulation [Z79.01]           Comments:           Anticoagulation Care Providers     Provider Role Specialty Phone number    Sharmaine Burks MD Referring Internal Medicine [phone]

## 2023-03-15 PROBLEM — Z86.79 HISTORY OF AORTIC DISSECTION: Status: ACTIVE | Noted: 2023-01-01

## 2023-03-15 PROBLEM — Z79.01 CURRENT USE OF LONG TERM ANTICOAGULATION: Status: RESOLVED | Noted: 2020-12-17 | Resolved: 2023-01-01

## 2023-03-15 PROBLEM — I07.1 SEVERE TRICUSPID REGURGITATION: Status: ACTIVE | Noted: 2023-01-01

## 2023-03-15 PROBLEM — I50.812 CHRONIC RIGHT-SIDED HEART FAILURE (H): Status: ACTIVE | Noted: 2023-01-01

## 2023-03-15 PROBLEM — I07.1 TRICUSPID VALVE INSUFFICIENCY, UNSPECIFIED ETIOLOGY: Status: RESOLVED | Noted: 2022-07-26 | Resolved: 2023-01-01

## 2023-03-16 PROBLEM — F41.1 GAD (GENERALIZED ANXIETY DISORDER): Status: ACTIVE | Noted: 2023-01-01

## 2023-03-16 NOTE — PROGRESS NOTES
PREOP-ADDENDUM:    Patient may proceed with bilateral cataract surgery (Dr. Muñoz, [facility], Fax# [phone]), both in April, without any further evaluation.    Sharmaine Burks MD   [facility] [employer]  [address] [address]  T: [phone], F: [phone]    ASSESSMENT/PLAN                                                       (Z00.00) Medicare annual wellness visit, subsequent  (primary encounter diagnosis)  Comment: PMH, PSH, FH, SH, medications, allergies, immunizations, and preventative health measures reviewed and updated as appropriate.  Plan: see below for plans.      (N18.31) Stage 3a chronic kidney disease (H)  Comment: known issue that I take into account for their medical decisions, no current exacerbations or new concerns.    (F41.1) NEDA (generalized anxiety disorder)  Comment: poorly-controlled on current regimen.   Plan: INCREASE Lexapro from 10 to 20mg daily; DECREASE Remeron from 15 to 7.5mg at night.    (L98.9) Skin lesion  Plan: referred to dermatology for routine skin check and for evaluation of right cheek skin lesion that patient would like removed - patient will be contacted to schedule.      Appropriate preventive services were discussed with this patient, including applicable screening as appropriate for cardiovascular disease, diabetes, osteopenia/osteoporosis, and glaucoma.  As appropriate for age/gender, discussed screening for colorectal cancer, prostate cancer, breast cancer, and cervical cancer. Checklist reviewing preventive services available has been given to the patient.    Reviewed patients plan of care. The Basic Care Plan (routine screening as documented in Health Maintenance) for Priya Funez meets the Care Plan requirement. This Care Plan has been established and reviewed with the Patient.    Sharmaine Burks MD   [facility] [employer]  [address] [address]  T: [phone], F: [phone]    SUBJECTIVE                                                       Priya Funez is a very pleasant 82 year old female who presents for her subsequent AWV:    Current providers (other than myself): OX POLO, Sundar (cardiology)    PMH, PSH, FH, SH, medications, allergies, immunizations, preventative health, and health risk assessment reviewed and updated as appropriate.    Past Medical History:   Diagnosis Date     Benign essential hypertension      Chronic atrial fibrillation (H)     s/p AV node ablation and PPM placement January, 2019     Chronic diastolic heart failure (H)      Chronic kidney disease, stage III (moderate) (H)      Chronic right-sided heart failure (H)      History of aortic dissection     s/p emergent repair 2019     Severe tricuspid regurgitation      Past Surgical History:   Procedure Laterality Date     ANGIOGRAM Right 01/04/2019    Procedure: DIAGONSTIC ANGIOGRAM OF SMA;  Surgeon: Rigo Jennings MD;  Location:  OR     BIOPSY BREAST       BYPASS GRAFT ARTERY CORONARY, REPAIR VALVE AORTIC, COMBINED N/A 01/04/2019    Procedure: AORTIC DISSECTION REPAIR WITH GRAFT- HEMASHIELD PLATINUM WOVEN DOUBLE VELOR 4 SIDE ARM VASCULAR GRAFT, D:35 X 12 X 10 X 10MM/ L:50CM;  Surgeon: Jose Winslow MD;  Location:  OR     EP ABLATION AV NODE N/A 01/11/2019    Procedure: EP Ablation AV Node;  Surgeon: Tsering Davey MD;  Location:  HEART CARDIAC CATH LAB     EP PACEMAKER Left 01/11/2019    Procedure: EP Pacemaker;  Surgeon: Tsering Davey MD;  Location:  HEART CARDIAC CATH LAB     THYROID SURGERY      benign mass removed     TRACHEOSTOMY N/A 01/25/2019    Procedure: TRACHEOSTOMY  (DR MCCLELLAND);  Surgeon: Bryson Mcclelland MD;  Location:  OR     TUBAL LIGATION       Family History   Problem Relation Age of Onset     Ataxia Mother      Heart Disease Father      Diabetes No family hx of      Myocardial Infarction No family hx of      Cerebrovascular Disease No family hx of      Coronary Artery Disease  Early Onset No family hx of      Breast Cancer No family hx of      Colon Cancer No family hx of      Ovarian Cancer No family hx of      Social History     Occupational History     Occupation: Retired - customer service   Tobacco Use     Smoking status: Never     Smokeless tobacco: Never   Vaping Use     Vaping Use: Never used   Substance and Sexual Activity     Alcohol use: Not Currently     Drug use: No     Sexual activity: Not on file   Social History Narrative    .    Lives in home with . Fully independent.    4 adult children.    8 grandchildren.     Allergies   Allergen Reactions     Amoxicillin Swelling     Face and body     Ciprofloxacin Hives     Current Outpatient Medications   Medication Sig     Acetaminophen 325 MG CAPS Take 325-650 mg by mouth every 4 hours as needed     albuterol (PROAIR HFA/PROVENTIL HFA/VENTOLIN HFA) 108 (90 Base) MCG/ACT inhaler Inhale 2 puffs into the lungs every 6 hours as needed for shortness of breath / dyspnea or wheezing     aspirin (ASA) 81 MG EC tablet Take 81 mg by mouth every 24 hours     cholecalciferol 25 MCG (1000 UT) TABS Take 1,000 Units by mouth daily      cyanocobalamin (VITAMIN B-12) 100 MCG tablet Take 1,000 mcg by mouth daily      empagliflozin (JARDIANCE) 10 MG TABS tablet Take 1 tablet (10 mg) by mouth daily     escitalopram (LEXAPRO) 10 MG tablet Take 1 tablet (10 mg) by mouth daily     metoprolol tartrate (LOPRESSOR) 25 MG tablet Take 1 tablet (25 mg) by mouth 2 times daily     mirtazapine (REMERON) 15 MG tablet Take 1 tablet (15 mg) by mouth At Bedtime     spironolactone (ALDACTONE) 25 MG tablet Take 1 tablet (25 mg) by mouth daily     torsemide (DEMADEX) 10 MG tablet Take 4 tablets(40mg) by mouth every day     warfarin ANTICOAGULANT (COUMADIN) 2 MG tablet Take one tablet (2 mg) by mouth on Fridays; take two tablets (4 mg) all other days; or as instructed by ACC team.     Immunization History   Administered Date(s) Administered     COVID-19  "Vaccine 12+ (Pfizer 2022) 04/25/2022     COVID-19 Vaccine 12+ (Pfizer) 01/30/2021, 02/20/2021, 09/30/2021     COVID-19 Vaccine Bivalent Booster 12+ (Pfizer) 09/30/2022     FLU 6-35 months 01/10/2013     FLUAD(HD)65+ QUAD 10/15/2020     Flu 65+ Years 10/18/2019     Influenza Vaccine 65+ (Fluzone HD) 11/11/2022     Influenza Vaccine >6 months (Alfuria,Fluzone) 12/06/2021     Pneumo Conj 13-V (2010&after) 02/22/2017     Pneumococcal 23 valent 05/01/2009     TDAP Vaccine (Adacel) 12/15/2014     Tdap (Adacel,Boostrix) 12/15/2014     PREVENTATIVE HEALTH                                                      BMI: overweight  Blood pressure: well-controlled on current regimen   Breast CA screening: up to date   Colon CA screening: screening no longer indicated  Lung CA screening: n/a   Dexa: DUE - patient declines   Screening cholesterol: screening no longer indicated   Screening diabetes: up to date   Alcohol misuse screening: alcohol use reviewed - no intervention indicated at this time  Immunizations: reviewed; Shingrix series DUE - not covered by Medicare (may obtain in pharmacy if desired)     HEALTH RISK ASSESSMENT                                                      In general, how would you rate your overall physical health? good  Outside of work, how many days during the week do you exercise? 2-3 days/week  Outside of work, approximately how many minutes a day do you exercise? less than 15 minutes    If you drink alcohol do you typically have >3 drinks per day or >7 drinks per week? No  Do you usually eat at least 4 servings of fruit and vegetables a day, include whole grains & fiber and avoid regularly eating high fat or \"junk\" foods? No     Do you have any problems taking medications regularly? No  Do you have any side effects from medications? No    Assistance with daily activities: No    Safety concerns: No    Fall risk assessment: completed today (see ambulatory assessments)    Hearing concerns: No    In the " "past 6 months, have you been bothered by leaking of urine: Yes    In general, how would you rate your overall mental or emotional health: good    PHQ-2/PHQ-9 assessment: completed today (see ambulatory assessments)    Additional concerns today: No    OBJECTIVE                                                      /71 (BP Location: Left arm)   Pulse 79   Temp 98.5  F (36.9  C) (Oral)   Resp 16   Ht 1.613 m (5' 3.5\")   Wt 73.2 kg (161 lb 6.4 oz)   SpO2 97%   BMI 28.14 kg/m    Constitutional: well-appearing  Head, Ears, and Eyes: normocephalic; normal external auditory canal and pinna; tympanic membranes visualized and normal; normal lids and conjunctivae  Neck: supple, symmetric, no thyromegaly or lymphadenopathy  Respiratory: normal respiratory effort; clear to auscultation bilaterally  Cardiovascular: regular rate and rhythm; no edema  Gastrointestinal: soft, non-tender, and non-distended; no organomegaly or masses  Musculoskeletal: walks with walker  Psych: normal judgment and insight; normal mood and affect; recent and remote memory intact    Cognitive impairment noted: No  ---  (Note was completed, in part, with [employer] voice-recognition software. Documentation was reviewed, but some grammatical, spelling, and word errors may remain.)  "

## 2023-03-17 NOTE — PROGRESS NOTES
ANTICOAGULATION MANAGEMENT     Priya MARISOL Funez 82 year old female is on warfarin with supratherapeutic INR result. (Goal INR 2.0-3.0)    Recent labs: (last 7 days)     03/17/23  1512   INR 3.3*       ASSESSMENT       Source(s): Chart Review and Patient/Caregiver Call       Warfarin doses taken: Warfarin taken as instructed    Diet: No new diet changes identified    New illness, injury, or hospitalization: No    Medication/supplement changes: None noted    Signs or symptoms of bleeding or clotting: No    Previous INR: Therapeutic last 2(+) visits    Additional findings: None         PLAN     Recommended plan for no diet, medication or health factor changes affecting INR  Would like to cut back on greens alittle    Dosing Instructions: decrease your warfarin dose (7% change) with next INR in 2 weeks       Summary  As of 3/17/2023    Full warfarin instructions:  2 mg every Mon, Fri; 4 mg all other days   Next INR check:  3/31/2023             Telephone call with Priya who verbalizes understanding and agrees to plan    Lab visit scheduled    Education provided:     Please call back if any changes to your diet, medications or how you've been taking warfarin    Plan made per [facility] anticoagulation protocol    Christine Adames RN  Anticoagulation Clinic  3/17/2023    _______________________________________________________________________     Anticoagulation Episode Summary     Current INR goal:  2.0-3.0   TTR:  77.2 % (1 y)   Target end date:  Indefinite   Send INR reminders to:  [facility]    Indications    Chronic atrial fibrillation (H) [I48.20]  Current use of long term anticoagulation (Resolved) [Z79.01]           Comments:           Anticoagulation Care Providers     Provider Role Specialty Phone number    Sharmaine Burks MD Referring Internal Medicine [phone]

## 2023-03-20 NOTE — PROGRESS NOTES
[facility]    Orders for BMP placed for follow-up with Dr. Kwok.     Future Appointments   Date Time Provider Department Center   3/31/2023 12:15 PM Becky Kwok MD [facility] CLIN   3/31/2023  3:30 PM [facility]   6/20/2023 12:00 AM [address] PSA CLIN       KIKE Teran   12:09 PM 3/20/2023    CORE nurse line M-F 8a-4p: [phone]

## 2023-03-31 NOTE — PROGRESS NOTES
"ANTICOAGULATION MANAGEMENT     Priya Funez 82 year old female is on warfarin with therapeutic INR result. (Goal INR 2.0-3.0)    Recent labs: (last 7 days)     03/31/23  1528   INR 2.5*       ASSESSMENT       Source(s): Chart Review and Patient/Caregiver Call       Warfarin doses taken: Warfarin taken differently, but did not change total weekly dose - see calendar    Diet: No new diet changes identified    New illness, injury, or hospitalization: Yes: excessive GERD sx likely related to euvolemic    Medication/supplement changes: Cardiology advised an additional 10mg PO torsemide today. Per up-to-date, \"may increase the serum concentration of Warfarin.\"    Signs or symptoms of bleeding or clotting: No    Previous INR: Supratherapeutic    Additional findings: Upcoming surgery/procedure left eye cataract procedure 4/3/23 and right eye cataract procedure on 4/24/23      Cardiac MRI to be scheduled        PLAN     Recommended plan for temporary change(s) affecting INR     Dosing Instructions: Continue your current warfarin dose, but change the daily dosing slightly as noted below with next INR in 2 weeks       Summary  As of 3/31/2023    Full warfarin instructions:  2 mg every Mon, Fri; 4 mg all other days   Next INR check:  4/14/2023             Telephone call with Priya who agrees to plan and repeated back plan correctly    Lab visit scheduled    Education provided:     Please call back if any changes to your diet, medications or how you've been taking warfarin    Symptom monitoring: monitoring for bleeding signs and symptoms and monitoring for clotting signs and symptoms    Plan made per [facility] anticoagulation protocol    Lucretia Go RN  Anticoagulation Clinic  3/31/2023    _______________________________________________________________________     Anticoagulation Episode Summary     Current INR goal:  2.0-3.0   TTR:  75.8 % (1 y)   Target end date:  Indefinite   Send INR reminders to:  [facility]    Indications    Chronic atrial fibrillation (H) [I48.20]  Current use of long term anticoagulation (Resolved) [Z79.01]           Comments:           Anticoagulation Care Providers     Provider Role Specialty Phone number    Sharmaine Burks MD Referring Internal Medicine [phone]

## 2023-03-31 NOTE — PROGRESS NOTES
ANTICOAGULATION MANAGEMENT     Priya Funez 82 year old female is on warfarin with therapeutic INR result. (Goal INR 2.0-3.0)    Recent labs: (last 7 days)     03/31/23  1528   INR 2.5*       ASSESSMENT       Source(s): Chart Review    Previous INR was Therapeutic last 2(+) visits    Medication, diet, health changes since last INR chart reviewed - patient had Cardiology ov today - additional dose of torsemide recommended today - may raise INR level         PLAN     Unable to reach Priya today.    Left message to continue 2 mg tonight and 4 mg Sat & Sun this weekend. Request call back for assessment.    Follow up required to confirm warfarin dose taken and assess for changes    Lucretia Go, RN  Anticoagulation Clinic  3/31/2023

## 2023-03-31 NOTE — PROGRESS NOTES
[facility] Progress note    Assessment:  1.  Chronic diastolic heart failure/HFpEF/right heart failure with severe tricuspid regurgitation:  last echo 6/2/2022 LVEF 55-60%, RV moderately to severely dilated, RVIDD at base 5.7cm, mild decrease in RV systolic function and severe tricuspid regurgitation.  Exacerbation of heart failure symptoms this week may have been due to dietary indiscretion.  Symptoms are improving weight is near baseline.              - NYHA class III, stage C.               - Fluid status : close to euvolemic, goal weight ~ 155# on home scale. Was 157# today.              - Diuretic regimen : torsemide 40mg daily, additional 10-20mg PRN for weight gain and/or worsening HF symptoms.               - Aldosterone antagonist : spironolactone 25mg daily              - SGLT2i: jardiance 10mg daily   - ARNI : none at this time, could consider in the future, but BP may not tolerate.  2. Chronic atrial fibrillation s/p AV node ablation 1/2019 - stable, asymptomatic. Most recent device interrogation was stable in 3/2023.               - Continue routine follow up with EP and device clinic              - [id number] score 3 (age, female, HF)              - Continue warfarin for thromboembolic prophylaxis.   3. Emergent aortic dissection repair 1/4/19  4. Hypertension - controlled, continue current medications  5. Obesity   6. Mild mitral regurgitation  7. Severe tricuspid regurgitation    Plan:  1. Cardiac MRI to eval LV/RV function, TR and mechanism, pericardium  2. Take an additional 10mg PO torsemide today.   3. Continue to carefully monitor daily weight at home  4. Follow up with me after MRI.     HPI:   Priyabereket Funez is a very nice 82 year old woman with history of HFpEF, atrial fibrillation s/p AV leo ablation with PPM implantation 1/11/2019, emergent aortic dissection repair 1/4/19, hypertension and obesity.     She is here for routine visit.  She recently had a CT angiogram chest abdomen  and pelvis of her aorta and her chronic dissection after repair appears stable.    Over the past week she has felt a little short of breath and bloated, but it is getting.  Her weight went up a few pounds: on her home scale today it was 157, and she thinks baseline should be 155, but yesterday she was 160. She has been taking her medications. She thinks this weight gain might have been due to some egg salad from [employer] which may have been salty.     Recent cardiovascular testing  IMPRESSION:  1. 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 The dissection flap extends from the distal aspect  of the repair to the left common iliac artery, unchanged.  2. Compression fractures of T12 and L1, unchanged.  3. Diverticulosis without evidence of diverticulitis.  4. Decreased perfusion of the left kidney, similar to the prior exam.  5. Cholelithiasis without evidence of cholecystitis.    Most recent cardiac device check on 3/14/23  showed chronic A-fib with 100% ventricular pacing and 9.2 years of battery life remaining    Last echo June 2022  Echo result w/o MOPS: Interpretation Summary The visual ejection fraction is 55-60%. Abnormal septal motion consistent withincreased ventricular interdependence.The right ventricle is moderate to severely dilated. RVIDD at the basemeasures 5.7 cms. Mildly decreased right ventricular systolic functionThe left atrium is moderately dilated. 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 There is severe (4+) tricuspidregurgitation. Hepatic vein flow consistent with severe tricuspidinsufficiency is present.Dilation of the inferior vena cava is present with abnormal respiratoryvariation in diameter.There is no pericardial effusion.        Encounter Diagnosis   Name Primary?     Chronic diastolic congestive heart failure (H)        CURRENT MEDICATIONS:  Current Outpatient Medications    Medication Sig Dispense Refill     Acetaminophen 325 MG CAPS Take 325-650 mg by mouth every 4 hours as needed       albuterol (PROAIR HFA/PROVENTIL HFA/VENTOLIN HFA) 108 (90 Base) MCG/ACT inhaler Inhale 2 puffs into the lungs every 6 hours as needed for shortness of breath / dyspnea or wheezing 18 g 0     aspirin (ASA) 81 MG EC tablet Take 81 mg by mouth every 24 hours       cholecalciferol 25 MCG (1000 UT) TABS Take 1,000 Units by mouth daily        cyanocobalamin (VITAMIN B-12) 100 MCG tablet Take 1,000 mcg by mouth daily        empagliflozin (JARDIANCE) 10 MG TABS tablet Take 1 tablet (10 mg) by mouth daily 90 tablet 1     escitalopram (LEXAPRO) 20 MG tablet Take 1 tablet (20 mg) by mouth daily 90 tablet 3     metoprolol tartrate (LOPRESSOR) 25 MG tablet Take 1 tablet (25 mg) by mouth 2 times daily 180 tablet 3     mirtazapine (REMERON) 7.5 MG tablet Take 1 tablet (7.5 mg) by mouth At Bedtime 90 tablet 3     spironolactone (ALDACTONE) 25 MG tablet Take 1 tablet (25 mg) by mouth daily 90 tablet 3     torsemide (DEMADEX) 10 MG tablet Take 4 tablets(40mg) by mouth every day 360 tablet 3     warfarin ANTICOAGULANT (COUMADIN) 2 MG tablet Take one tablet (2 mg) by mouth on Fridays; take two tablets (4 mg) all other days; or as instructed by ACC team. 170 tablet 1       ALLERGIES     Allergies   Allergen Reactions     Amoxicillin Swelling     Face and body     Ciprofloxacin Hives       PAST MEDICAL, SURGICAL, FAMILY, SOCIAL HISTORY:  History was reviewed and updated as needed, see medical record.    REVIEW OF SYSTEMS:  Skin:  Negative     Eyes:  Positive for glasses  ENT:  Negative    Respiratory:  Positive for dyspnea on exertion  Cardiovascular:  Negative;palpitations;chest pain;edema    Gastroenterology: Positive for excessive gas or bloating  Genitourinary:  Positive for urinary frequency  Musculoskeletal:  Negative    Neurologic:  not assessed    Psychiatric:  Positive for    Heme/Lymph/Imm:  Positive for  allergies  Endocrine:  Negative      PHYSICAL EXAM:      BP: 109/71 Pulse: 83     SpO2: 93 %      Vital Signs with Ranges  Pulse:  [83] 83  BP: (109)/(71) 109/71  SpO2:  [93 %] 93 %  160 lbs 0 oz    Constitutional: awake, alert, no distress  Eyes: sclera nonicteric  ENT: trachea midline  Respiratory: lungs are clear bilaterally  Cardiovascular: regular. JVP notable Prominent v-wave to >10cm. 2/6 systolic murmur LLSB, cannot appreciate variability with respiration  GI: nondistended, nontender, bowel sounds present  Lymph/Hematologic: no lymphadenopathy  Skin: dry, no rash no edema  Musculoskeletal: grossly normal tone  Neurologic: no gross focal deficits  Neuropsychiatric: normal affect    Recent Lab Results:  LIPID RESULTS:  Lab Results   Component Value Date    CHOL 134 06/14/2021    HDL 53 06/14/2021    LDL 67 06/14/2021    TRIG 71 06/14/2021       LIVER ENZYME RESULTS:  Lab Results   Component Value Date    AST 27 04/04/2019    ALT 22 06/14/2021       CBC RESULTS:  Lab Results   Component Value Date    WBC 5.3 06/14/2021    RBC 4.33 06/14/2021    HGB 12.6 03/11/2022    HGB 12.8 06/14/2021    HCT 40.1 06/14/2021    MCV 93 06/14/2021    MCH 29.6 06/14/2021    MCHC 31.9 06/14/2021    RDW 13.5 06/14/2021     06/14/2021       BMP RESULTS:  Lab Results   Component Value Date     03/23/2023     06/23/2021    POTASSIUM 3.8 03/23/2023    POTASSIUM 3.8 12/13/2022    POTASSIUM 4.5 06/23/2021    CHLORIDE 99 03/23/2023    CHLORIDE 99 12/13/2022    CHLORIDE 104 06/23/2021    CO2 34 (H) 03/23/2023    CO2 33 (H) 12/13/2022    CO2 31 06/23/2021    ANIONGAP 7 03/23/2023    ANIONGAP 7 12/13/2022    ANIONGAP 3 06/23/2021    GLC 76 03/23/2023    GLC 84 12/13/2022    GLC 86 06/23/2021    BUN 33.0 (H) 03/23/2023    BUN 31 (H) 12/13/2022    BUN 30 06/23/2021    CR 1.34 (H) 03/23/2023    CR 1.29 (H) 06/23/2021    GFRESTIMATED 40 (L) 03/23/2023    GFRESTIMATED 39 (L) 06/23/2021    GFRESTBLACK 46 (L) 06/23/2021    BESS  9.7 03/23/2023    BESS 9.2 06/23/2021        A1C RESULTS:  Lab Results   Component Value Date    A1C 5.6 01/30/2019    A1C 5.3 01/04/2019       INR RESULTS:  Lab Results   Component Value Date    INR 3.3 (H) 03/17/2023    INR 2.6 (H) 02/10/2023    INR 2.43 (H) 03/11/2022    INR 2.30 (H) 07/09/2021    INR 2.40 (H) 06/10/2021           CC  Xochitl Eden, APRN CNP  [address]  [place] [address]

## 2023-03-31 NOTE — LETTER
3/31/2023    Sharmaine Burks MD  [address] [facility] [address]    RE: Priya Funez       Dear Colleague,     I had the pleasure of seeing Priya Funez in the [facility].  [facility] Progress note    Assessment:  1.  Chronic diastolic heart failure/HFpEF/right heart failure with severe tricuspid regurgitation:  last echo 6/2/2022 LVEF 55-60%, RV moderately to severely dilated, RVIDD at base 5.7cm, mild decrease in RV systolic function and severe tricuspid regurgitation.  Exacerbation of heart failure symptoms this week may have been due to dietary indiscretion.  Symptoms are improving weight is near baseline.              - NYHA class III, stage C.               - Fluid status : close to euvolemic, goal weight ~ 155# on home scale. Was 157# today.              - Diuretic regimen : torsemide 40mg daily, additional 10-20mg PRN for weight gain and/or worsening HF symptoms.               - Aldosterone antagonist : spironolactone 25mg daily              - SGLT2i: jardiance 10mg daily   - ARNI : none at this time, could consider in the future, but BP may not tolerate.  2. Chronic atrial fibrillation s/p AV node ablation 1/2019 - stable, asymptomatic. Most recent device interrogation was stable in 3/2023.               - Continue routine follow up with EP and device clinic              - [id number] score 3 (age, female, HF)              - Continue warfarin for thromboembolic prophylaxis.   3. Emergent aortic dissection repair 1/4/19  4. Hypertension - controlled, continue current medications  5. Obesity   6. Mild mitral regurgitation  7. Severe tricuspid regurgitation    Plan:  Cardiac MRI to eval LV/RV function, TR and mechanism, pericardium  Take an additional 10mg PO torsemide today.   Continue to carefully monitor daily weight at home  Follow up with me after MRI.     HPI:   Priya Funez is a very nice 82 year old woman with history of HFpEF, atrial fibrillation s/p AV  leo ablation with PPM implantation 1/11/2019, emergent aortic dissection repair 1/4/19, hypertension and obesity.     She is here for routine visit.  She recently had a CT angiogram chest abdomen and pelvis of her aorta and her chronic dissection after repair appears stable.    Over the past week she has felt a little short of breath and bloated, but it is getting.  Her weight went up a few pounds: on her home scale today it was 157, and she thinks baseline should be 155, but yesterday she was 160. She has been taking her medications. She thinks this weight gain might have been due to some egg salad from [employer] which may have been salty.     Recent cardiovascular testing  IMPRESSION:  1. 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 The dissection flap extends from the distal aspect  of the repair to the left common iliac artery, unchanged.  2. Compression fractures of T12 and L1, unchanged.  3. Diverticulosis without evidence of diverticulitis.  4. Decreased perfusion of the left kidney, similar to the prior exam.  5. Cholelithiasis without evidence of cholecystitis.    Most recent cardiac device check on 3/14/23  showed chronic A-fib with 100% ventricular pacing and 9.2 years of battery life remaining    Last echo June 2022  Echo result w/o MOPS: Interpretation Summary The visual ejection fraction is 55-60%. Abnormal septal motion consistent withincreased ventricular interdependence.The right ventricle is moderate to severely dilated. RVIDD at the basemeasures 5.7 cms. Mildly decreased right ventricular systolic functionThe left atrium is moderately dilated. 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 There is severe (4+) tricuspidregurgitation. Hepatic vein flow consistent with severe tricuspidinsufficiency is present.Dilation of the inferior vena cava is present with abnormal respiratoryvariation in  diameter.There is no pericardial effusion.        Encounter Diagnosis   Name Primary?    Chronic diastolic congestive heart failure (H)        CURRENT MEDICATIONS:  Current Outpatient Medications   Medication Sig Dispense Refill    Acetaminophen 325 MG CAPS Take 325-650 mg by mouth every 4 hours as needed      albuterol (PROAIR HFA/PROVENTIL HFA/VENTOLIN HFA) 108 (90 Base) MCG/ACT inhaler Inhale 2 puffs into the lungs every 6 hours as needed for shortness of breath / dyspnea or wheezing 18 g 0    aspirin (ASA) 81 MG EC tablet Take 81 mg by mouth every 24 hours      cholecalciferol 25 MCG (1000 UT) TABS Take 1,000 Units by mouth daily       cyanocobalamin (VITAMIN B-12) 100 MCG tablet Take 1,000 mcg by mouth daily       empagliflozin (JARDIANCE) 10 MG TABS tablet Take 1 tablet (10 mg) by mouth daily 90 tablet 1    escitalopram (LEXAPRO) 20 MG tablet Take 1 tablet (20 mg) by mouth daily 90 tablet 3    metoprolol tartrate (LOPRESSOR) 25 MG tablet Take 1 tablet (25 mg) by mouth 2 times daily 180 tablet 3    mirtazapine (REMERON) 7.5 MG tablet Take 1 tablet (7.5 mg) by mouth At Bedtime 90 tablet 3    spironolactone (ALDACTONE) 25 MG tablet Take 1 tablet (25 mg) by mouth daily 90 tablet 3    torsemide (DEMADEX) 10 MG tablet Take 4 tablets(40mg) by mouth every day 360 tablet 3    warfarin ANTICOAGULANT (COUMADIN) 2 MG tablet Take one tablet (2 mg) by mouth on Fridays; take two tablets (4 mg) all other days; or as instructed by ACC team. 170 tablet 1       ALLERGIES     Allergies   Allergen Reactions    Amoxicillin Swelling     Face and body    Ciprofloxacin Hives       PAST MEDICAL, SURGICAL, FAMILY, SOCIAL HISTORY:  History was reviewed and updated as needed, see medical record.    REVIEW OF SYSTEMS:  Skin:  Negative     Eyes:  Positive for glasses  ENT:  Negative    Respiratory:  Positive for dyspnea on exertion  Cardiovascular:  Negative;palpitations;chest pain;edema    Gastroenterology: Positive for excessive gas or  bloating  Genitourinary:  Positive for urinary frequency  Musculoskeletal:  Negative    Neurologic:  not assessed    Psychiatric:  Positive for    Heme/Lymph/Imm:  Positive for allergies  Endocrine:  Negative      PHYSICAL EXAM:      BP: 109/71 Pulse: 83     SpO2: 93 %      Vital Signs with Ranges  Pulse:  [83] 83  BP: (109)/(71) 109/71  SpO2:  [93 %] 93 %  160 lbs 0 oz    Constitutional: awake, alert, no distress  Eyes: sclera nonicteric  ENT: trachea midline  Respiratory: lungs are clear bilaterally  Cardiovascular: regular. JVP notable Prominent v-wave to >10cm. 2/6 systolic murmur LLSB, cannot appreciate variability with respiration  GI: nondistended, nontender, bowel sounds present  Lymph/Hematologic: no lymphadenopathy  Skin: dry, no rash no edema  Musculoskeletal: grossly normal tone  Neurologic: no gross focal deficits  Neuropsychiatric: normal affect    Recent Lab Results:  LIPID RESULTS:  Lab Results   Component Value Date    CHOL 134 06/14/2021    HDL 53 06/14/2021    LDL 67 06/14/2021    TRIG 71 06/14/2021       LIVER ENZYME RESULTS:  Lab Results   Component Value Date    AST 27 04/04/2019    ALT 22 06/14/2021       CBC RESULTS:  Lab Results   Component Value Date    WBC 5.3 06/14/2021    RBC 4.33 06/14/2021    HGB 12.6 03/11/2022    HGB 12.8 06/14/2021    HCT 40.1 06/14/2021    MCV 93 06/14/2021    MCH 29.6 06/14/2021    MCHC 31.9 06/14/2021    RDW 13.5 06/14/2021     06/14/2021       BMP RESULTS:  Lab Results   Component Value Date     03/23/2023     06/23/2021    POTASSIUM 3.8 03/23/2023    POTASSIUM 3.8 12/13/2022    POTASSIUM 4.5 06/23/2021    CHLORIDE 99 03/23/2023    CHLORIDE 99 12/13/2022    CHLORIDE 104 06/23/2021    CO2 34 (H) 03/23/2023    CO2 33 (H) 12/13/2022    CO2 31 06/23/2021    ANIONGAP 7 03/23/2023    ANIONGAP 7 12/13/2022    ANIONGAP 3 06/23/2021    GLC 76 03/23/2023    GLC 84 12/13/2022    GLC 86 06/23/2021    BUN 33.0 (H) 03/23/2023    BUN 31 (H) 12/13/2022    BUN  30 06/23/2021    CR 1.34 (H) 03/23/2023    CR 1.29 (H) 06/23/2021    GFRESTIMATED 40 (L) 03/23/2023    GFRESTIMATED 39 (L) 06/23/2021    GFRESTBLACK 46 (L) 06/23/2021    BESS 9.7 03/23/2023    BESS 9.2 06/23/2021        A1C RESULTS:  Lab Results   Component Value Date    A1C 5.6 01/30/2019    A1C 5.3 01/04/2019       INR RESULTS:  Lab Results   Component Value Date    INR 3.3 (H) 03/17/2023    INR 2.6 (H) 02/10/2023    INR 2.43 (H) 03/11/2022    INR 2.30 (H) 07/09/2021    INR 2.40 (H) 06/10/2021           CC  Xochitl Eden, APRN CNP  [address]  [place] [address]      Thank you for allowing me to participate in the care of your patient.      Sincerely,     Becky Kwok MD     [facility]

## 2023-04-03 NOTE — PROGRESS NOTES
Received MRI Cardiology Clearance form from [facility] CV-MRI department.  Form completed, signed by MD, and faxed back to MRI at [phone].      KIKE Bañuelos

## 2023-04-04 NOTE — TELEPHONE ENCOUNTER
Patient called and is wondering if she could go back to the original dosage of mirtazapine and escitalopram. Patient stated that the new dosage is not working as it the original dosage was. Medication and pharmacy is pended. Patient is requesting a call back with the provider's response.    Cierra GATES RN  [facility] Triage Team

## 2023-04-05 NOTE — TELEPHONE ENCOUNTER
Called and spoke to the patient. Relayed message from the provider. Patient expressed understanding and had no additional questions at this time.    Pauline Garcia RN

## 2023-04-05 NOTE — PROGRESS NOTES
"Assessment:    SARS-CoV-2 positive    Chronic atrial fibrillation (H)    Chronic diastolic heart failure (H)    History of aortic dissection    Severe tricuspid regurgitation     COVID-19 positive patient.  Encounter for consideration of medication intervention.    Patient does qualify for a prescription.   Full discussion with patient including medication options, risks and benefits.   Potential drug interactions reviewed with patient.      Plan:  Treatment planned   Paxlovid will be sent in to preferred pharmacy.     Temporary change to home medications:   Recheck INR at day 5    Lonnie Powers  is a 82 year old  who has a confirmed new positive COVID-19 diagnosis.      She has been identified as high risk for complications of this infection, and is being evaluated via a billable     Phone visit.      Phone call duration: 9 minutes  Patient location: Home  Provider location: Home    Concern for COVID-19  When did symptoms begin? 4/4/23    Positive COVID test?Yes    Symptoms (Cough, trouble breathing, fever, chills, headache, sore throat, chest pain, diarrhea, body aches)?  Fever, chills, shortness of breath         Constitutional, HEENT, cardiovascular, pulmonary, gi and gu systems are negative, except as otherwise noted.    Objective    Vitals:  No vitals were obtained today due to virtual visit.  Estimated body mass index is 27.9 kg/m  as calculated from the following:    Height as of 3/31/23: 1.613 m (5' 3.5\").    Weight as of 3/31/23: 72.6 kg (160 lb).   General: Alert, no apparent distress  PSYCH: Alert; coherent speech, normal rate and volume, able to articulate logical thoughts, appropriate insight, no tangential thoughts, no hallucination or delusions.  Affect is appropriate  RESP: No cough, no audible wheezing, able to talk in full sentences  Remainder of exam unable to be completed due to telephone visit  GFR Estimate   Date Value Ref Range Status   03/23/2023 40 (L) >60 mL/min/1.73m2 Final     " "Comment:     eGFR calculated using 2021 CKD-EPI equation.   06/23/2021 39 (L) >60 mL/min/[1.73_m2] Final     Comment:     Non  GFR Calc  Starting 12/18/2018, serum creatinine based estimated GFR (eGFR) will be   calculated using the Chronic Kidney Disease Epidemiology Collaboration   (CKD-EPI) equation.                The patient has been notified of following:     \"This telephone visit will be conducted via a call between you and your physician/provider. We have found that certain health care needs can be provided without the need for a physical exam.  This service lets us provide the care you need with a short phone conversation.  If a prescription is necessary we can send it directly to your pharmacy.  If lab work is needed we can place an order for that and you can then stop by our lab to have the test done at a later time.    Telephone visits are billed at different rates depending on your insurance coverage. During this emergency period, for some insurers they may be billed the same as an in-person visit.  Please reach out to your insurance provider with any questions.    If during the course of the call the physician/provider feels a telephone visit is not appropriate, you will not be charged for this service.\"    Patient has given verbal consent for Telephone visit?  Yes    "

## 2023-04-05 NOTE — TELEPHONE ENCOUNTER
Spoke with patient regarding positive Covid test. Patient was Triaged and conducted Covid treatment protocol with patient, see results below.     Patient reported voice hoarseness, temp of 100.7, chills, body aches, cough, and nasal congestion. Patient reported O2 was 96% with a pulse of 88. Patient reported she did have trouble breathing this morning but breathing got better after taking her scheduled Torsemide, patient stated this happens every morning. No other red flag symptoms reported.     Reason for Disposition    [1] HIGH RISK for severe COVID complications (e.g., weak immune system, age > 64 years, obesity with BMI of 30 or higher, pregnant, chronic lung disease or other chronic medical condition) AND [2] COVID symptoms (e.g., cough, fever)  (Exceptions: Already seen by PCP and no new or worsening symptoms.)    Additional Information    Negative: SEVERE difficulty breathing (e.g., struggling for each breath, speaks in single words)    Negative: Difficult to awaken or acting confused (e.g., disoriented, slurred speech)    Negative: Shock suspected (e.g., cold/pale/clammy skin, too weak to stand, low BP, rapid pulse)    Negative: Bluish (or gray) lips or face now    Negative: Sounds like a life-threatening emergency to the triager    Negative: [1] Diagnosed or suspected COVID-19 AND [2] symptoms lasting 3 or more weeks    Negative: [1] COVID-19 exposure AND [2] no symptoms    Negative: COVID-19 vaccine reaction suspected (e.g., fever, headache, muscle aches) occurring 1 to 3 days after getting vaccine    Negative: COVID-19 vaccine, questions about    Negative: [1] Lives with someone known to have influenza (flu test positive) AND [2] flu-like symptoms (e.g., cough, runny nose, sore throat, SOB; with or without fever)    Negative: [1] Adult with possible COVID-19 symptoms AND [2] triager concerned about severity of symptoms or other causes    Negative: COVID-19 and breastfeeding, questions about    Negative:  SEVERE or constant chest pain or pressure  (Exception: Mild central chest pain, present only when coughing.)    Negative: MODERATE difficulty breathing (e.g., speaks in phrases, SOB even at rest, pulse 100-120)    Negative: Headache and stiff neck (can't touch chin to chest)    Negative: Oxygen level (e.g., pulse oximetry) 90 percent or lower    Negative: Chest pain or pressure  (Exception: MILD central chest pain, present only when coughing)    Negative: Patient sounds very sick or weak to the triager    Negative: MILD difficulty breathing (e.g., minimal/no SOB at rest, SOB with walking, pulse <100)    Negative: Fever > 103 F (39.4 C)    Negative: [1] Fever > 101 F (38.3 C) AND [2] over 60 years of age    Negative: [1] Fever > 100.0 F (37.8 C) AND [2] bedridden (e.g., nursing home patient, CVA, chronic illness, recovering from surgery)    Protocols used: CORONAVIRUS (COVID-19) DIAGNOSED OR [id number]      RN COVID TREATMENT VISIT  04/05/23      The patient has been triaged and does not require a higher level of care.    Priya MARISOL Funez  82 year old  Current weight? 159 lbs    Has the patient been seen by a primary care provider at an [facility] or [facility] within the past two years? Yes.   Have you been in close proximity to/do you have a known exposure to a person with a confirmed case of influenza? No.     General treatment eligibility:  Date of positive COVID test (PCR or at home)?  4/5/2023    Are you or have you been hospitalized for this COVID-19 infection? No.   Have you received monoclonal antibodies or antiviral treatment for COVID-19 since this positive test? No.   Do you have any of the following conditions that place you at risk of being very sick from COVID-19?   - Age 50 years or older  - Chronic kidney disease (mild to moderate; eGFR or GFR 30-59 mL/min)  - Heart conditions such as cardiomyopathies, congenital heart defects, coronary artery disease, heart  arrhythmias, heart failure, hypertension, valve disorders   Yes, patient has at least one high risk condition as noted above.     Current COVID symptoms:   - fever or chills  - cough  - fatigue  - muscle or body aches  - sore throat  - congestion or runny nose  - nausea or vomiting  Yes. Patient has at least one symptom as selected.     How many days since symptoms started? 5 days or less. Established patient, 12 years or older weighing at least 88.2 lbs, who has symptoms that started in the past 5 days, has not been hospitalized nor received treatment already, and is at risk for being very sick from COVID-19.     Treatment eligibility by RN:    Are you currently pregnant or nursing? No    Do you have a clinically significant hypersensitivity to nirmatrelvir or ritonavir, or toxic epidermal necrolysis (TEN) or Mantilla-Monty Syndrome? No    Do you have a history of hepatitis, any hepatic impairment on the Problem List (such as Child-Riggins Class C, cirrhosis, fatty liver disease, alcoholic liver disease), or was the last liver lab (hepatic panel, ALT, AST, ALK Phos, bilirubin) elevated in the past 6 months? No    Do you have any history of severe renal impairment (eGFR < 30mL/min)? No    Is patient eligible to continue?   Yes, patient meets all eligibility requirements for the RN COVID treatment (as denoted by all no responses above).     Current Outpatient Medications   Medication Sig Dispense Refill     Acetaminophen 325 MG CAPS Take 325-650 mg by mouth every 4 hours as needed       albuterol (PROAIR HFA/PROVENTIL HFA/VENTOLIN HFA) 108 (90 Base) MCG/ACT inhaler Inhale 2 puffs into the lungs every 6 hours as needed for shortness of breath / dyspnea or wheezing 18 g 0     aspirin (ASA) 81 MG EC tablet Take 81 mg by mouth every 24 hours       cholecalciferol 25 MCG (1000 UT) TABS Take 1,000 Units by mouth daily        cyanocobalamin (VITAMIN B-12) 100 MCG tablet Take 1,000 mcg by mouth daily        empagliflozin  (JARDIANCE) 10 MG TABS tablet Take 1 tablet (10 mg) by mouth daily 90 tablet 1     escitalopram (LEXAPRO) 10 MG tablet Take 1 tablet (10 mg) by mouth daily 90 tablet 3     metoprolol tartrate (LOPRESSOR) 25 MG tablet Take 1 tablet (25 mg) by mouth 2 times daily 180 tablet 3     mirtazapine (REMERON) 15 MG tablet Take 1 tablet (15 mg) by mouth At Bedtime 90 tablet 3     spironolactone (ALDACTONE) 25 MG tablet Take 1 tablet (25 mg) by mouth daily 90 tablet 3     torsemide (DEMADEX) 10 MG tablet Take 4 tablets(40mg) by mouth every day 360 tablet 3     warfarin ANTICOAGULANT (COUMADIN) 2 MG tablet Take one tablet (2 mg) by mouth on Fridays; take two tablets (4 mg) all other days; or as instructed by [facility] team. 170 tablet 1       Medications from List 1 of the standing order (on medications that exclude the use of Paxlovid) that patient is taking: NONE. Is patient taking Mckenzie's Wort? No  Is patient taking [place]arn[place]'s Wort or any meds from List 1? No.   Medications from List 2 of the standing order (on meds that provider needs to adjust) that patient is taking: warfarin (Coumadin, Jantoven), explained a provider visit is necessary to discuss medication adjustments while taking Paxlovid. Is patient on any of the meds from List 2? Yes. Patient will be scheduled or transferred to a  at the end of this call.   Justice L. Phoenix, RN

## 2023-04-05 NOTE — TELEPHONE ENCOUNTER
COVID Positive/Requesting COVID treatment    Patient is positive for COVID and requesting treatment options.    Date of positive COVID test (PCR or at home)? 4/05/2023  Current COVID symptoms: fever or chills, cough, shortness of breath or difficulty breathing, fatigue, muscle or body aches and congestion or runny nose  Date COVID symptoms began: 4/04/2023    Message should be routed to clinic RN pool. Best phone number to use for call back: [phone]

## 2023-04-06 NOTE — TELEPHONE ENCOUNTER
Talked with Brianna.  Started paxlovid yesterday. Scheluded INR for Monday. She will eats more greens. Has cough, no temp and poor appetite. O2  Stats in 90s.

## 2023-04-06 NOTE — TELEPHONE ENCOUNTER
Voicemail from patient stating that she was diagnosed with Covid, and wants to know how she should proceed.

## 2023-04-10 NOTE — TELEPHONE ENCOUNTER
[facility] Call Center    Phone Message    May a detailed message be left on voicemail: yes     Reason for Call: Other:   Patient daughter would like a call back by Xochitl Eden. Daughter would like a update.     Action Taken: Other: Cardiology    Travel Screening: Not Applicable     Thank you!  [facility]

## 2023-04-10 NOTE — PROGRESS NOTES
ANTICOAGULATION MANAGEMENT     Priya MARISOL Funez 82 year old female is on warfarin with supratherapeutic INR result. (Goal INR 2.0-3.0)    Recent labs: (last 7 days)     04/10/23  1354   INR 3.3*       ASSESSMENT       Source(s): Chart Review and Patient/Caregiver Call       Warfarin doses taken: Warfarin taken as instructed    Diet: covid  may be affecting diet and INR    New illness, injury, or hospitalization: Yes: positive for Covid 4/5    Medication/supplement changes: paxlovid 4/5, has finished course she said    Signs or symptoms of bleeding or clotting: No    Previous INR: Therapeutic last visit; previously outside of goal range    Additional findings: None is feeling well currently, maybeless appetite         PLAN     Recommended plan for temporary change(s) affecting INR     Dosing Instructions: hold dose then continue your current warfarin dose with next INR in 2 weeks       Summary  As of 4/10/2023    Full warfarin instructions:  4/10: Hold; Otherwise 2 mg every Mon, Fri; 4 mg all other days   Next INR check:  4/24/2023             Telephone call with Priya who verbalizes understanding and agrees to plan    Lab visit scheduled    Education provided:     Please call back if any changes to your diet, medications or how you've been taking warfarin    Contact [phone]  with any changes, questions or concerns.     Plan made per [facility] anticoagulation protocol    Robinson Brush RN  Anticoagulation Clinic  4/10/2023    _______________________________________________________________________     Anticoagulation Episode Summary     Current INR goal:  2.0-3.0   TTR:  74.7 % (1 y)   Target end date:  Indefinite   Send INR reminders to:  [facility]    Indications    Chronic atrial fibrillation (H) [I48.20]  Current use of long term anticoagulation (Resolved) [Z79.01]           Comments:           Anticoagulation Care Providers     Provider Role Specialty Phone number    Sharmaine Burks MD  [facility] Internal Medicine [phone]

## 2023-04-17 NOTE — PROGRESS NOTES
Received MRI Cardiology Clearance form from [facility] MRI department.  Form completed, signed by MD, and faxed back to MRI at [phone].  Device is compatible.  BASHIR STOKES

## 2023-04-21 NOTE — PROGRESS NOTES
ANTICOAGULATION MANAGEMENT     Priya MARISOL Funez 82 year old female is on warfarin with therapeutic INR result. (Goal INR 2.0-3.0)    Recent labs: (last 7 days)     04/21/23  1424   INR 2.2*       ASSESSMENT       Source(s): Chart Review and Patient/Caregiver Call       Warfarin doses taken: Warfarin taken as instructed    Diet: No new diet changes identified    Medication/supplement changes: None noted    New illness, injury, or hospitalization: No    Signs or symptoms of bleeding or clotting: No    Previous INR: Supratherapeutic    Additional findings: None         PLAN     Recommended plan for no diet, medication or health factor changes affecting INR     Dosing Instructions: Continue your current warfarin dose with next INR in 3 weeks       Summary  As of 4/21/2023    Full warfarin instructions:  2 mg every Mon, Fri; 4 mg all other days   Next INR check:  5/12/2023             Telephone call with Priya who verbalizes understanding and agrees to plan    Lab visit scheduled    Education provided:     Please call back if any changes to your diet, medications or how you've been taking warfarin    Plan made per [facility] anticoagulation protocol    Ana Alvarado RN  Anticoagulation Clinic  4/21/2023    _______________________________________________________________________     Anticoagulation Episode Summary     Current INR goal:  2.0-3.0   TTR:  73.9 % (1 y)   Target end date:  Indefinite   Send INR reminders to:  [facility]    Indications    Chronic atrial fibrillation (H) [I48.20]  Current use of long term anticoagulation (Resolved) [Z79.01]           Comments:           Anticoagulation Care Providers     Provider Role Specialty Phone number    Sharmaine Burks MD Referring Internal Medicine [phone]

## 2023-05-03 NOTE — PROGRESS NOTES
Cardiac Core protocol  4ch stack  RV 2 ch stack  Qp/Qs  Contrast administered per weight based protocol.  Per Dr Myles

## 2023-05-08 NOTE — TELEPHONE ENCOUNTER
Received referral from Dr. Kwok. Patient has severe tricuspid regurgitation. Initial screening shows that she may be a candidate for CLASP II trial. Spoke with patient today to discuss. She would like to come see Dr. Shea in clinic to discuss the trial further. Scheduled patient to see Dr. Shea 6/2/23.

## 2023-05-12 NOTE — PROGRESS NOTES
ANTICOAGULATION MANAGEMENT     Priya MARISOL Funez 82 year old female is on warfarin with supratherapeutic INR result. (Goal INR 2.0-3.0)    Recent labs: (last 7 days)     05/12/23  1504   INR 5.6*       ASSESSMENT       Source(s): Chart Review and Patient/Caregiver Call       Warfarin doses taken: Warfarin taken as instructed    Diet: Is eating   less food.  Lowered  dosing by 8.3 %     Medication/supplement changes: None noted    New illness, injury, or hospitalization: No    Signs or symptoms of bleeding or clotting: Yes: Had IV recently with multiple tries to get the IV in.  Bruising goiong away    Previous result: Therapeutic last visit; previously outside of goal range  Additional findings: None  Does not think she has a UTI       PLAN     Recommended plan for no diet, medication or health factor changes affecting INR     Dosing Instructions: hold 2 doses then continue your current warfarin dose with next INR in 3 days       Summary  As of 5/12/2023    Full warfarin instructions:  5/12: Hold; 5/13: Hold; Otherwise 2 mg every Mon, Wed, Fri; 4 mg all other days   Next INR check:  5/15/2023             Telephone call with Priya who verbalizes understanding and agrees to plan    Lab visit scheduled    Education provided:     Please call back if any changes to your diet, medications or how you've been taking warfarin    Plan made per [facility] anticoagulation protocol    Christine Adames RN  Anticoagulation Clinic  5/12/2023    _______________________________________________________________________     Anticoagulation Episode Summary     Current INR goal:  2.0-3.0   TTR:  69.5 % (1 y)   Target end date:  Indefinite   Send INR reminders to:  [facility]    Indications    Chronic atrial fibrillation (H) [I48.20]  Current use of long term anticoagulation (Resolved) [Z79.01]           Comments:           Anticoagulation Care Providers     Provider Role Specialty Phone number    Sharmaine Burks MD  [facility] Internal Medicine [phone]

## 2023-05-15 NOTE — PROGRESS NOTES
ANTICOAGULATION MANAGEMENT     Priya Funez 82 year old female is on warfarin with therapeutic INR result. (Goal INR 2.0-3.0)    Recent labs: (last 7 days)     05/15/23  1504   INR 2.0*       ASSESSMENT       Source(s): Chart Review and Patient/Caregiver Call       Warfarin doses taken: Held for 2 days  recently which may be affecting INR    Diet: Increased greens/vitamin K in diet; plans to resume previous intake (increased since last supratherapeutic check) + in general, patient is eating less    Medication/supplement changes: None noted    New illness, injury, or hospitalization: Ongoing cough ever since tracheostomy ~ removed 4 years ago     Signs or symptoms of bleeding or clotting: No    Previous result: Supratherapeutic    Additional findings: MD previoulsy reduced by 8.3% ~ reduction slightly less than protocol, therefore, ACRN advised recheck next week to ensure stability         PLAN     Recommended plan for temporary change(s) and ongoing change(s) affecting INR     Dosing Instructions: Continue your current warfarin dose with next INR in 8 days       Summary  As of 5/15/2023    Full warfarin instructions:  2 mg every Mon, Wed, Fri; 4 mg all other days   Next INR check:  5/23/2023             Telephone call with Priya who verbalizes understanding and agrees to plan    Lab visit scheduled    Education provided:     Please call back if any changes to your diet, medications or how you've been taking warfarin    Symptom monitoring: monitoring for bleeding signs and symptoms and monitoring for clotting signs and symptoms    Plan made per [facility] anticoagulation protocol    Lucretia Go, RN  Anticoagulation Clinic  5/15/2023    _______________________________________________________________________     Anticoagulation Episode Summary     Current INR goal:  2.0-3.0   TTR:  68.9 % (1 y)   Target end date:  Indefinite   Send INR reminders to:  [facility]    Indications    Chronic atrial  fibrillation (H) [I48.20]  Current use of long term anticoagulation (Resolved) [Z79.01]           Comments:           Anticoagulation Care Providers     Provider Role Specialty Phone number    Sharmaine Burks MD Referring Internal Medicine [phone]

## 2023-05-15 NOTE — LETTER
5/15/2023    Sharmaine Burks MD  [address] [facility] [address]    RE: Priya Funez       Dear Colleague,     I had the pleasure of seeing Priya Funez in the [facility].  HPI and Plan:     Priya Funez is a pleasant 81 y/o female, history of HFpEF, s/p ascening aortic dissection repair with cornelio-shield graft 2019, afib, s/p AVN ablation and PPM, hypertension, obesity and tricuspid regurgitation followed by Dr. Kwok and [facility] clinic.  She is here to discuss potential for TV TIFF repair.   Seen by Dr. Kwok 331 noted SOB, bloating, weight gain.  Somewhat improved currently.     She comes in with her  and daughter today.  I reviewed her cardiovascular MRI.  The right ventricle is dilated but preserved function.  Left side is normal.  The MRI specific in stating concerned that the pacer lead is attached to the septal leaflet of the tricuspid valve and likely etiology for severe tricuspid regurgitation.  I did confirm severe tricuspid regurgitation.  I also reviewed the echocardiogram confirming severe tricuspid dilatation or so regurgitation and RV dilatation.  She has some symptoms of fatigue and shortness of breath but is somewhat improved with changing her diet.  She has no edema.    I discussed the clasp 2 trial.  The 2-1 randomization.  I discussed that most likely the valve will not be amenable however would require transesophageal echocardiogram to be certain.  We will plan on having her research team call her and set up the ALEX at [facility].  She is aware of the risk and benefits involved.  She would even consider open heart surgery should that be necessary for tricuspid valve repair.  I am not sure what exactly would do and probably would need electrophysiology input as well given her pacer lead.  Is a complex medical decision with complex decision making here.      Today's clinic visit entailed:  Review of external notes as documented elsewhere in  note  Review of the result(s) of each unique test - TTE, cardiac MRI  Ordering of each unique test  50 minutes spent by me on the date of the encounter doing chart review, history and exam, documentation and further activities per the note  Provider  Link to [facility] Help Grid     The level of medical decision making during this visit was of high complexity.      No orders of the defined types were placed in this encounter.    No orders of the defined types were placed in this encounter.    There are no discontinued medications.      No diagnosis found.    CURRENT MEDICATIONS:  Current Outpatient Medications   Medication Sig Dispense Refill    Acetaminophen 325 MG CAPS Take 325-650 mg by mouth every 4 hours as needed      albuterol (PROAIR HFA/PROVENTIL HFA/VENTOLIN HFA) 108 (90 Base) MCG/ACT inhaler Inhale 2 puffs into the lungs every 6 hours as needed for shortness of breath / dyspnea or wheezing 18 g 0    aspirin (ASA) 81 MG EC tablet Take 81 mg by mouth every 24 hours      cholecalciferol 25 MCG (1000 UT) TABS Take 1,000 Units by mouth daily       cyanocobalamin (VITAMIN B-12) 100 MCG tablet Take 1,000 mcg by mouth daily       empagliflozin (JARDIANCE) 10 MG TABS tablet Take 1 tablet (10 mg) by mouth daily 90 tablet 1    escitalopram (LEXAPRO) 10 MG tablet Take 1 tablet (10 mg) by mouth daily 90 tablet 3    metoprolol tartrate (LOPRESSOR) 25 MG tablet Take 1 tablet (25 mg) by mouth 2 times daily 180 tablet 3    mirtazapine (REMERON) 15 MG tablet Take 1 tablet (15 mg) by mouth At Bedtime 90 tablet 3    spironolactone (ALDACTONE) 25 MG tablet Take 1 tablet (25 mg) by mouth daily 90 tablet 3    torsemide (DEMADEX) 10 MG tablet Take 4 tablets(40mg) by mouth every day 360 tablet 3    warfarin ANTICOAGULANT (COUMADIN) 2 MG tablet Take one tablet (2 mg) by mouth on Fridays; take two tablets (4 mg) all other days; or as instructed by ACC team. 170 tablet 1       ALLERGIES     Allergies   Allergen Reactions    Amoxicillin  Swelling     Face and body    Ciprofloxacin Hives       PAST MEDICAL HISTORY:  Past Medical History:   Diagnosis Date    Benign essential hypertension     Chronic atrial fibrillation (H)     s/p AV node ablation and PPM placement January, 2019    Chronic diastolic heart failure (H)     Chronic kidney disease, stage III (moderate) (H)     Chronic right-sided heart failure (H)     NEDA (generalized anxiety disorder)     History of aortic dissection     s/p emergent repair 2019    Severe tricuspid regurgitation        PAST SURGICAL HISTORY:  Past Surgical History:   Procedure Laterality Date    ANGIOGRAM Right 01/04/2019    Procedure: DIAGONSTIC ANGIOGRAM OF SMA;  Surgeon: Rigo Jennings MD;  Location:  OR    BIOPSY BREAST      BYPASS GRAFT ARTERY CORONARY, REPAIR VALVE AORTIC, COMBINED N/A 01/04/2019    Procedure: AORTIC DISSECTION REPAIR WITH GRAFT- HEMASHIELD PLATINUM WOVEN DOUBLE VELOR 4 SIDE ARM VASCULAR GRAFT, D:35 X 12 X 10 X 10MM/ L:50CM;  Surgeon: Jose Winslow MD;  Location:  OR    EP ABLATION AV NODE N/A 01/11/2019    Procedure: EP Ablation AV Node;  Surgeon: Tsering Davey MD;  Location:  HEART CARDIAC CATH LAB    EP PACEMAKER Left 01/11/2019    Procedure: EP Pacemaker;  Surgeon: Tsering Davey MD;  Location:  HEART CARDIAC CATH LAB    THYROID SURGERY      benign mass removed    TRACHEOSTOMY N/A 01/25/2019    Procedure: TRACHEOSTOMY  (DR MCCLELLAND);  Surgeon: Bryson Mcclelland MD;  Location:  OR    TUBAL LIGATION         FAMILY HISTORY:  Family History   Problem Relation Age of Onset    Ataxia Mother     Heart Disease Father     Diabetes No family hx of     Myocardial Infarction No family hx of     Cerebrovascular Disease No family hx of     Coronary Artery Disease Early Onset No family hx of     Breast Cancer No family hx of     Colon Cancer No family hx of     Ovarian Cancer No family hx of        SOCIAL HISTORY:  Social History     Socioeconomic History    Marital status:  "     Spouse name: None    Number of children: None    Years of education: None    Highest education level: None   Occupational History    Occupation: Retired - customer service   Tobacco Use    Smoking status: Never    Smokeless tobacco: Never   Vaping Use    Vaping status: Never Used   Substance and Sexual Activity    Alcohol use: Not Currently    Drug use: No   Social History Narrative    .    Lives in home with . Fully independent.    4 adult children.    8 grandchildren.    Walks 2-3x/week.       Review of Systems:  Skin:        Eyes:       ENT:       Respiratory:  Positive for cough;clear expectorant  Cardiovascular:  palpitations;edema;syncope or near-syncope;cyanosis;exercise intolerance;lightheadedness;dizziness chest pain;fatigue  Gastroenterology:      Genitourinary:       Musculoskeletal:       Neurologic:       Psychiatric:       Heme/Lymph/Imm:       Endocrine:         Physical Exam:  Vitals: /73   Pulse 78   Ht 1.6 m (5' 3\")   Wt 71.3 kg (157 lb 1.6 oz)   BMI 27.83 kg/m      Constitutional:           Skin:             Head:           Eyes:           Lymph:      ENT:           Neck:           Respiratory:            Cardiac:                                                           GI:           Extremities and Muscular Skeletal:                 Neurological:           Psych:         Recent Lab Results:  LIPID RESULTS:  Lab Results   Component Value Date    CHOL 134 06/14/2021    HDL 53 06/14/2021    LDL 67 06/14/2021    TRIG 71 06/14/2021       LIVER ENZYME RESULTS:  Lab Results   Component Value Date    AST 27 04/04/2019    ALT 22 06/14/2021       CBC RESULTS:  Lab Results   Component Value Date    WBC 5.3 06/14/2021    RBC 4.33 06/14/2021    HGB 12.6 03/11/2022    HGB 12.8 06/14/2021    HCT 40.1 06/14/2021    MCV 93 06/14/2021    MCH 29.6 06/14/2021    MCHC 31.9 06/14/2021    RDW 13.5 06/14/2021     06/14/2021       BMP RESULTS:  Lab Results   Component Value " Date     03/23/2023     06/23/2021    POTASSIUM 3.8 03/23/2023    POTASSIUM 3.8 12/13/2022    POTASSIUM 4.5 06/23/2021    CHLORIDE 99 03/23/2023    CHLORIDE 99 12/13/2022    CHLORIDE 104 06/23/2021    CO2 34 (H) 03/23/2023    CO2 33 (H) 12/13/2022    CO2 31 06/23/2021    ANIONGAP 7 03/23/2023    ANIONGAP 7 12/13/2022    ANIONGAP 3 06/23/2021    GLC 76 03/23/2023    GLC 84 12/13/2022    GLC 86 06/23/2021    BUN 33.0 (H) 03/23/2023    BUN 31 (H) 12/13/2022    BUN 30 06/23/2021    CR 1.34 (H) 03/23/2023    CR 1.29 (H) 06/23/2021    GFRESTIMATED 40 (L) 03/23/2023    GFRESTIMATED 39 (L) 06/23/2021    GFRESTBLACK 46 (L) 06/23/2021    BESS 9.7 03/23/2023    BESS 9.2 06/23/2021        A1C RESULTS:  Lab Results   Component Value Date    A1C 5.6 01/30/2019    A1C 5.3 01/04/2019       INR RESULTS:  Lab Results   Component Value Date    INR 5.6 (HH) 05/12/2023    INR 2.2 (H) 04/21/2023    INR 2.43 (H) 03/11/2022    INR 2.30 (H) 07/09/2021    INR 2.40 (H) 06/10/2021           CC  No referring provider defined for this encounter.      Thank you for allowing me to participate in the care of your patient.      Sincerely,     JUAN YIN MD     [facility]

## 2023-05-15 NOTE — PROGRESS NOTES
HPI and Plan:     Priya Funez is a pleasant 81 y/o female, history of HFpEF, s/p ascening aortic dissection repair with cornelio-shield graft 2019, afib, s/p AVN ablation and PPM, hypertension, obesity and tricuspid regurgitation followed by Dr. Kwok and [facility] clinic.  She is here to discuss potential for TV TIFF repair.   Seen by Dr. Kwok 331 noted SOB, bloating, weight gain.  Somewhat improved currently.     She comes in with her  and daughter today.  I reviewed her cardiovascular MRI.  The right ventricle is dilated but preserved function.  Left side is normal.  The MRI specific in stating concerned that the pacer lead is attached to the septal leaflet of the tricuspid valve and likely etiology for severe tricuspid regurgitation.  I did confirm severe tricuspid regurgitation.  I also reviewed the echocardiogram confirming severe tricuspid dilatation or so regurgitation and RV dilatation.  She has some symptoms of fatigue and shortness of breath but is somewhat improved with changing her diet.  She has no edema.    I discussed the clasp 2 trial.  The 2-1 randomization.  I discussed that most likely the valve will not be amenable however would require transesophageal echocardiogram to be certain.  We will plan on having her research team call her and set up the ALEX at [facility].  She is aware of the risk and benefits involved.  She would even consider open heart surgery should that be necessary for tricuspid valve repair.  I am not sure what exactly would do and probably would need electrophysiology input as well given her pacer lead.  Is a complex medical decision with complex decision making here.      Today's clinic visit entailed:  Review of external notes as documented elsewhere in note  Review of the result(s) of each unique test - TTE, cardiac MRI  Ordering of each unique test  50 minutes spent by me on the date of the encounter doing chart review, history and exam, documentation and further  activities per the note  Provider  Link to [facility] Help Grid     The level of medical decision making during this visit was of high complexity.      No orders of the defined types were placed in this encounter.    No orders of the defined types were placed in this encounter.    There are no discontinued medications.      No diagnosis found.    CURRENT MEDICATIONS:  Current Outpatient Medications   Medication Sig Dispense Refill     Acetaminophen 325 MG CAPS Take 325-650 mg by mouth every 4 hours as needed       albuterol (PROAIR HFA/PROVENTIL HFA/VENTOLIN HFA) 108 (90 Base) MCG/ACT inhaler Inhale 2 puffs into the lungs every 6 hours as needed for shortness of breath / dyspnea or wheezing 18 g 0     aspirin (ASA) 81 MG EC tablet Take 81 mg by mouth every 24 hours       cholecalciferol 25 MCG (1000 UT) TABS Take 1,000 Units by mouth daily        cyanocobalamin (VITAMIN B-12) 100 MCG tablet Take 1,000 mcg by mouth daily        empagliflozin (JARDIANCE) 10 MG TABS tablet Take 1 tablet (10 mg) by mouth daily 90 tablet 1     escitalopram (LEXAPRO) 10 MG tablet Take 1 tablet (10 mg) by mouth daily 90 tablet 3     metoprolol tartrate (LOPRESSOR) 25 MG tablet Take 1 tablet (25 mg) by mouth 2 times daily 180 tablet 3     mirtazapine (REMERON) 15 MG tablet Take 1 tablet (15 mg) by mouth At Bedtime 90 tablet 3     spironolactone (ALDACTONE) 25 MG tablet Take 1 tablet (25 mg) by mouth daily 90 tablet 3     torsemide (DEMADEX) 10 MG tablet Take 4 tablets(40mg) by mouth every day 360 tablet 3     warfarin ANTICOAGULANT (COUMADIN) 2 MG tablet Take one tablet (2 mg) by mouth on Fridays; take two tablets (4 mg) all other days; or as instructed by ACC team. 170 tablet 1       ALLERGIES     Allergies   Allergen Reactions     Amoxicillin Swelling     Face and body     Ciprofloxacin Hives       PAST MEDICAL HISTORY:  Past Medical History:   Diagnosis Date     Benign essential hypertension      Chronic atrial fibrillation (H)     s/p AV node  ablation and PPM placement January, 2019     Chronic diastolic heart failure (H)      Chronic kidney disease, stage III (moderate) (H)      Chronic right-sided heart failure (H)      NEDA (generalized anxiety disorder)      History of aortic dissection     s/p emergent repair 2019     Severe tricuspid regurgitation        PAST SURGICAL HISTORY:  Past Surgical History:   Procedure Laterality Date     ANGIOGRAM Right 01/04/2019    Procedure: DIAGONSTIC ANGIOGRAM OF SMA;  Surgeon: Rigo Jennings MD;  Location:  OR     BIOPSY BREAST       BYPASS GRAFT ARTERY CORONARY, REPAIR VALVE AORTIC, COMBINED N/A 01/04/2019    Procedure: AORTIC DISSECTION REPAIR WITH GRAFT- HEMASHIELD PLATINUM WOVEN DOUBLE VELOR 4 SIDE ARM VASCULAR GRAFT, D:35 X 12 X 10 X 10MM/ L:50CM;  Surgeon: Jose Winslow MD;  Location:  OR     EP ABLATION AV NODE N/A 01/11/2019    Procedure: EP Ablation AV Node;  Surgeon: Tsering Davey MD;  Location:  HEART CARDIAC CATH LAB     EP PACEMAKER Left 01/11/2019    Procedure: EP Pacemaker;  Surgeon: Tsering Davey MD;  Location:  HEART CARDIAC CATH LAB     THYROID SURGERY      benign mass removed     TRACHEOSTOMY N/A 01/25/2019    Procedure: TRACHEOSTOMY  (DR MCCLELLAND);  Surgeon: Bryson Mcclelland MD;  Location:  OR     TUBAL LIGATION         FAMILY HISTORY:  Family History   Problem Relation Age of Onset     Ataxia Mother      Heart Disease Father      Diabetes No family hx of      Myocardial Infarction No family hx of      Cerebrovascular Disease No family hx of      Coronary Artery Disease Early Onset No family hx of      Breast Cancer No family hx of      Colon Cancer No family hx of      Ovarian Cancer No family hx of        SOCIAL HISTORY:  Social History     Socioeconomic History     Marital status:      Spouse name: None     Number of children: None     Years of education: None     Highest education level: None   Occupational History     Occupation: Retired - customer  "service   Tobacco Use     Smoking status: Never     Smokeless tobacco: Never   Vaping Use     Vaping status: Never Used   Substance and Sexual Activity     Alcohol use: Not Currently     Drug use: No   Social History Narrative    .    Lives in home with . Fully independent.    4 adult children.    8 grandchildren.    Walks 2-3x/week.       Review of Systems:  Skin:        Eyes:       ENT:       Respiratory:  Positive for cough;clear expectorant  Cardiovascular:  palpitations;edema;syncope or near-syncope;cyanosis;exercise intolerance;lightheadedness;dizziness chest pain;fatigue  Gastroenterology:      Genitourinary:       Musculoskeletal:       Neurologic:       Psychiatric:       Heme/Lymph/Imm:       Endocrine:         Physical Exam:  Vitals: /73   Pulse 78   Ht 1.6 m (5' 3\")   Wt 71.3 kg (157 lb 1.6 oz)   BMI 27.83 kg/m      Constitutional:           Skin:             Head:           Eyes:           Lymph:      ENT:           Neck:           Respiratory:            Cardiac:                                                           GI:           Extremities and Muscular Skeletal:                 Neurological:           Psych:         Recent Lab Results:  LIPID RESULTS:  Lab Results   Component Value Date    CHOL 134 06/14/2021    HDL 53 06/14/2021    LDL 67 06/14/2021    TRIG 71 06/14/2021       LIVER ENZYME RESULTS:  Lab Results   Component Value Date    AST 27 04/04/2019    ALT 22 06/14/2021       CBC RESULTS:  Lab Results   Component Value Date    WBC 5.3 06/14/2021    RBC 4.33 06/14/2021    HGB 12.6 03/11/2022    HGB 12.8 06/14/2021    HCT 40.1 06/14/2021    MCV 93 06/14/2021    MCH 29.6 06/14/2021    MCHC 31.9 06/14/2021    RDW 13.5 06/14/2021     06/14/2021       BMP RESULTS:  Lab Results   Component Value Date     03/23/2023     06/23/2021    POTASSIUM 3.8 03/23/2023    POTASSIUM 3.8 12/13/2022    POTASSIUM 4.5 06/23/2021    CHLORIDE 99 03/23/2023    CHLORIDE 99 " 12/13/2022    CHLORIDE 104 06/23/2021    CO2 34 (H) 03/23/2023    CO2 33 (H) 12/13/2022    CO2 31 06/23/2021    ANIONGAP 7 03/23/2023    ANIONGAP 7 12/13/2022    ANIONGAP 3 06/23/2021    GLC 76 03/23/2023    GLC 84 12/13/2022    GLC 86 06/23/2021    BUN 33.0 (H) 03/23/2023    BUN 31 (H) 12/13/2022    BUN 30 06/23/2021    CR 1.34 (H) 03/23/2023    CR 1.29 (H) 06/23/2021    GFRESTIMATED 40 (L) 03/23/2023    GFRESTIMATED 39 (L) 06/23/2021    GFRESTBLACK 46 (L) 06/23/2021    BESS 9.7 03/23/2023    BESS 9.2 06/23/2021        A1C RESULTS:  Lab Results   Component Value Date    A1C 5.6 01/30/2019    A1C 5.3 01/04/2019       INR RESULTS:  Lab Results   Component Value Date    INR 5.6 (HH) 05/12/2023    INR 2.2 (H) 04/21/2023    INR 2.43 (H) 03/11/2022    INR 2.30 (H) 07/09/2021    INR 2.40 (H) 06/10/2021           CC  No referring provider defined for this encounter.

## 2023-05-18 NOTE — TELEPHONE ENCOUNTER
CLASP II TR Initial Phone Visit    The primary objective of this study is to evaluate the safety and effectiveness of the OZZIE System with OMT compared to OMT alone in participants with symptomatic severe tricuspid regurgitation who may not be ideal candidates for tricuspid valve surgery and ay be eligible for transcatheter tricuspid valve repair.     her was contacted today, May 18, 2023  to discuss participation in the CLASP II TR study.     The consent discussion included:     Study description and purpose     Device description     Randomization  (2:1 ratio Ozzie Device:OMT)    Study visits    Risks of participation    Benefits (if any)    Alternatives    Voluntary participation    Confidentiality     Compensation/costs of participation    Injury and legal rights    Plan: Education Visit set for 5/23/23.     Sierra R. Behr-Holewinski

## 2023-05-23 NOTE — PROGRESS NOTES
CLASP II TR Education visit    The primary objective of this study is to evaluate the safety and effectiveness of the OZZIE System with OMT compared to OMT alone in participants with symptomatic severe tricuspid regurgitation who may not be ideal candidates for tricuspid valve surgery and ay be eligible for transcatheter tricuspid valve repair.     Priya Funez was contacted today, 05/23/23 to discuss participation in the CLASP II TR study.     The consent discussion included:     Study description and purpose     Device description     Randomization  (2:1 ratio Ozzie Device:OMT)    Study visits    Risks of participation    Benefits (if any)    Alternatives    Voluntary participation    Confidentiality     Compensation/costs of participation    Injury and legal rights    The subject is interested in learning more about the CLASP II TR study, they were provided with a copy of the consent form to take home and review.      Plan: , Darek, and daughter, Susie, were present for the education process and all were sent home with consent forms to review. I will call next Tuesday if I do not hear back from her.      Sierra R. Behr-Holewinski

## 2023-05-23 NOTE — PROGRESS NOTES
ANTICOAGULATION MANAGEMENT     Priya DAMON Armin 82 year old female is on warfarin with supratherapeutic INR result. (Goal INR 2.0-3.0)    Recent labs: (last 7 days)     05/23/23  1437   INR 4.9*       ASSESSMENT       Source(s): Chart Review and Patient/Caregiver Call       Warfarin doses taken: Warfarin taken as instructed    Diet: No new diet changes identified    Medication/supplement changes: None noted    New illness, injury, or hospitalization: No  Will be going into a trial with cardiology on the [employer] SYSTEM because ofsevere tricuspid regurgitation    Signs or symptoms of bleeding or clotting: No    Previous result: Therapeutic last visit; previously outside of goal range    Additional findings: None         PLAN     Recommended plan for no diet, medication or health factor changes affecting INR     Dosing Instructions: decrease your warfarin dose (18.2% change) with next INR in 1 week       Summary  As of 5/23/2023    Full warfarin instructions:  5/23: Hold; 5/24: Hold; Otherwise 4 mg every Sun, Thu; 2 mg all other days   Next INR check:  5/31/2023             Telephone call with Priya who verbalizes understanding and agrees to plan    Lab visit scheduled    Education provided:     Please call back if any changes to your diet, medications or how you've been taking warfarin    Plan made with [facility] Pharmacist Maggy Adames, RN  Anticoagulation Clinic  5/23/2023    _______________________________________________________________________     Anticoagulation Episode Summary     Current INR goal:  2.0-3.0   TTR:  67.5 % (1 y)   Target end date:  Indefinite   Send INR reminders to:  [facility]    Indications    Chronic atrial fibrillation (H) [I48.20]  Current use of long term anticoagulation (Resolved) [Z79.01]           Comments:           Anticoagulation Care Providers     Provider Role Specialty Phone number    Sharmaine Burks MD Referring Internal Medicine [phone]

## 2023-05-23 NOTE — LETTER
5/23/2023    Sharmaine Burks MD  [address] [facility] [address]    RE: Priya Funez       Dear Colleague,     I had the pleasure of seeing Priya Funez in the [facility].  CLASP II TR Education visit    The primary objective of this study is to evaluate the safety and effectiveness of the JEB System with OMT compared to OMT alone in participants with symptomatic severe tricuspid regurgitation who may not be ideal candidates for tricuspid valve surgery and ay be eligible for transcatheter tricuspid valve repair.     Priya Funez was contacted today, 05/23/23 to discuss participation in the CLASP II TR study.     The consent discussion included:   Study description and purpose   Device description   Randomization  (2:1 ratio Jeb Device:OMT)  Study visits  Risks of participation  Benefits (if any)  Alternatives  Voluntary participation  Confidentiality   Compensation/costs of participation  Injury and legal rights    The subject is interested in learning more about the CLASP II TR study, they were provided with a copy of the consent form to take home and review.      Plan: , Darek, and daughter, Susie, were present for the education process and all were sent home with consent forms to review. I will call next Tuesday if I do not hear back from her.      Sierra R. Behr-Holewinski          Thank you for allowing me to participate in the care of your patient.      Sincerely,     Sierra R. Behr-Holewinski     [facility]  cc:   No referring provider defined for this encounter.

## 2023-05-25 NOTE — TELEPHONE ENCOUNTER
Patient called to state that she would like to move forward with the CLASP TR trial. She has a consent and baseline visit set up for 6/8/23 with myself. TTE and ALEX are set up for 6/16/23 at [place].     No further questions at this time.     Sierra R. Behr-Holewinski

## 2023-05-31 NOTE — PROGRESS NOTES
ANTICOAGULATION MANAGEMENT     Priya Funez 82 year old female is on warfarin with subtherapeutic INR result. (Goal INR 2.0-3.0)    Recent labs: (last 7 days)     05/31/23  1104   INR 1.7*       ASSESSMENT       Source(s): Chart Review and Patient/Caregiver Call       Warfarin doses taken: Warfarin taken as instructed, last week's hold may still be factoring in, but next week's new maintenance dose will be 2 mg more than last 7 days's dose.    Diet: No new diet changes identified    Medication/supplement changes: None noted    New illness, injury, or hospitalization: No    Signs or symptoms of bleeding or clotting: No    Previous result: Supratherapeutic    Additional findings: None         PLAN     Recommended plan for no diet, medication or health factor changes affecting INR     Dosing Instructions: Continue your current warfarin dose with next INR in 7-10 days       Summary  As of 5/31/2023    Full warfarin instructions:  4 mg every Sun, Thu; 2 mg all other days   Next INR check:  6/9/2023             Telephone call with Priya who agrees to plan and repeated back plan correctly    Lab visit scheduled    Education provided:     Please call back if any changes to your diet, medications or how you've been taking warfarin    Contact [phone]  with any changes, questions or concerns.     Plan made with [facility] Pharmacist Maggy Villarreal RN  Anticoagulation Clinic  5/31/2023    _______________________________________________________________________     Anticoagulation Episode Summary     Current INR goal:  2.0-3.0   TTR:  66.0 % (1 y)   Target end date:  Indefinite   Send INR reminders to:  [facility]    Indications    Chronic atrial fibrillation (H) [I48.20]  Current use of long term anticoagulation (Resolved) [Z79.01]           Comments:           Anticoagulation Care Providers     Provider Role Specialty Phone number    Sharmaine Burks MD Referring Internal Medicine  [phone]

## 2023-05-31 NOTE — PROGRESS NOTES
Called Priya. No answer. Left vm to return call to [facility] RN.    Consulted with KODY Wintre RPH. Plan to continue with new lower maintenance dose and recheck INR in another 7-10 days. Holds are still factoring into today's 1.7 INR.     Patient is moving forward with the CLASP TR Trial for tricuspid regurgitation with Cardiology. Has TTE and ALEX scheduled 6/16/23 at [place].     Amara JHA RN  Anticoagulation Team

## 2023-06-05 NOTE — LETTER
6/5/2023    Sharmaine Burks MD  [address] [facility] [address]    RE: Priya Funez       Dear Colleague,     I had the pleasure of seeing Priya Funez in the [facility].  [facility] Progress note    Assessment:  1.  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 Plan for ALEX to assess mechanism of Tricuspid regurgitation   2. Chronic atrial fibrillation s/p AV node ablation 1/2019  3. Emergent aortic dissection repair 1/4/19. Prolonged hospitalization with tracheostomy after her repair  4. Hypertension - controlled  5. Obesity   6. Mild mitral regurgitation  7. Severe tricuspid regurgitation    Plan:  Recommend scheduling anesthesia to be present during ALEX due to history of tracheostomy with difficult wean and history of mild dysphagia with dry food (no known choking, aspiration or esophageal disorder).   The structural team participating in the clasp 2 trial we will follow-up on her transesophageal echo result and make further recommendations.    The risks and benefits of ALEX has been discussed with the patient and/or relatives in detail including risk of death, esophageal tear, pharyngeal hematoma, methemoglobinemia, or other gastrointestinal or oral injury resulting in hemorrhage. The patient is willing to proceed.      HPI:     Priya Funez is a very nice 82 year old woman here for preprocedure evaluation prior to a transesophageal echo.  She has severe tricuspid regurgitation and is being considered for the clasp 2 trial for percutaneous treatment of her severe tricuspid regurgitation.  She been recommended to have a transesophageal echocardiogram to further evaluate the mechanism of her tricuspid valve and specifically evaluate the role her pacemaker lead is playing in the TR.      She has a pertinent history of with history of an emergent aortic dissection repair in 2019 with a prolonged hospitalization after her  repair and difficult ventilator wean requiring tracheostomy.  She also has chronic A-fib and has had an AV node ablation and pacemaker placement. in 2019    From a heart failure standpoint she is doing well at this time.  She tries to stay active and follow a low-sodium diet and is taking all of her medications.  Her weight at home is stable at 155 pounds.  Recently her dyspnea has been well controlled and she has no chest pain.  She has no edema on her current diuretic regimen.    She has no known esophageal disorder but does state that sometimes it feels like dry food sticks in her throat a little bit for the past few years.  She has no known history of esophageal varices or restriction or gastric ulcer.  She has good range of motion in her neck and no symptoms of cervical arthritis.      CURRENT MEDICATIONS:  Current Outpatient Medications   Medication Sig Dispense Refill    Acetaminophen 325 MG CAPS Take 325-650 mg by mouth every 4 hours as needed      albuterol (PROAIR HFA/PROVENTIL HFA/VENTOLIN HFA) 108 (90 Base) MCG/ACT inhaler Inhale 2 puffs into the lungs every 6 hours as needed for shortness of breath / dyspnea or wheezing 18 g 0    aspirin (ASA) 81 MG EC tablet Take 81 mg by mouth every 24 hours      cholecalciferol 25 MCG (1000 UT) TABS Take 1,000 Units by mouth daily       cyanocobalamin (VITAMIN B-12) 100 MCG tablet Take 1,000 mcg by mouth daily       empagliflozin (JARDIANCE) 10 MG TABS tablet Take 1 tablet (10 mg) by mouth daily 90 tablet 1    escitalopram (LEXAPRO) 10 MG tablet Take 1 tablet (10 mg) by mouth daily 90 tablet 3    metoprolol tartrate (LOPRESSOR) 25 MG tablet Take 1 tablet (25 mg) by mouth 2 times daily 180 tablet 3    mirtazapine (REMERON) 15 MG tablet Take 1 tablet (15 mg) by mouth At Bedtime 90 tablet 3    spironolactone (ALDACTONE) 25 MG tablet Take 1 tablet (25 mg) by mouth daily 90 tablet 3    torsemide (DEMADEX) 10 MG tablet Take 4 tablets(40mg) by mouth every day 360 tablet 3     warfarin ANTICOAGULANT (COUMADIN) 2 MG tablet Take one tablet (2 mg) by mouth on Fridays; take two tablets (4 mg) all other days; or as instructed by ACC team. 170 tablet 1       ALLERGIES     Allergies   Allergen Reactions    Amoxicillin Swelling     Face and body    Ciprofloxacin Hives       PAST MEDICAL, SURGICAL, FAMILY, SOCIAL HISTORY:  History was reviewed and updated as needed, see medical record.    REVIEW OF SYSTEMS:  Skin:  Negative     Eyes:  Positive for glasses  ENT:  Negative    Respiratory:  Positive for dyspnea at rest  Cardiovascular:    fatigue;Positive for  Gastroenterology: Negative    Genitourinary:  Negative    Musculoskeletal:  Negative    Neurologic:  Negative    Psychiatric:  Positive for anxiety;depression  Heme/Lymph/Imm:  Negative    Endocrine:  Negative      PHYSICAL EXAM:      BP: 114/66 Pulse: 80            Vital Signs with Ranges  Pulse:  [80] 80  BP: (114)/(66) 114/66  155 lbs 12.8 oz    Constitutional: awake, alert, no distress  Eyes: sclera nonicteric  ENT: trachea midline  Respiratory: Lungs are clear bilaterally  Cardiovascular: Regular 2 out of 6 systolic murmur loudest at left lower sternal border and louder with inspiration  GI: nondistended, nontender, bowel sounds present  Skin: dry, no rash no ankle or pedal edema at this time  Musculoskeletal: grossly normal muscle  tone for age  Neurologic: no  gross focal deficits  Neuropsychiatric: normal affect    Recent Lab Results:  LIPID RESULTS:  Lab Results   Component Value Date    CHOL 134 06/14/2021    HDL 53 06/14/2021    LDL 67 06/14/2021    TRIG 71 06/14/2021       LIVER ENZYME RESULTS:  Lab Results   Component Value Date    AST 27 04/04/2019    ALT 22 06/14/2021       CBC RESULTS:  Lab Results   Component Value Date    WBC 5.3 06/14/2021    RBC 4.33 06/14/2021    HGB 12.6 03/11/2022    HGB 12.8 06/14/2021    HCT 40.1 06/14/2021    MCV 93 06/14/2021    MCH 29.6 06/14/2021    MCHC 31.9 06/14/2021    RDW 13.5 06/14/2021      06/14/2021       BMP RESULTS:  Lab Results   Component Value Date     03/23/2023     06/23/2021    POTASSIUM 3.8 03/23/2023    POTASSIUM 3.8 12/13/2022    POTASSIUM 4.5 06/23/2021    CHLORIDE 99 03/23/2023    CHLORIDE 99 12/13/2022    CHLORIDE 104 06/23/2021    CO2 34 (H) 03/23/2023    CO2 33 (H) 12/13/2022    CO2 31 06/23/2021    ANIONGAP 7 03/23/2023    ANIONGAP 7 12/13/2022    ANIONGAP 3 06/23/2021    GLC 76 03/23/2023    GLC 84 12/13/2022    GLC 86 06/23/2021    BUN 33.0 (H) 03/23/2023    BUN 31 (H) 12/13/2022    BUN 30 06/23/2021    CR 1.34 (H) 03/23/2023    CR 1.29 (H) 06/23/2021    GFRESTIMATED 40 (L) 03/23/2023    GFRESTIMATED 39 (L) 06/23/2021    GFRESTBLACK 46 (L) 06/23/2021    BESS 9.7 03/23/2023    BESS 9.2 06/23/2021        A1C RESULTS:  Lab Results   Component Value Date    A1C 5.6 01/30/2019    A1C 5.3 01/04/2019       INR RESULTS:  Lab Results   Component Value Date    INR 1.7 (H) 05/31/2023    INR 4.9 (H) 05/23/2023    INR 2.43 (H) 03/11/2022    INR 2.30 (H) 07/09/2021    INR 2.40 (H) 06/10/2021       Thank you for allowing me to participate in the care of your patient.      Sincerely,     Becky Kwok MD     [facility]  cc:   Becky Kwok MD  [address] [address]

## 2023-06-05 NOTE — PROGRESS NOTES
[facility] Progress note    Assessment:  1.  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 Plan for ALEX to assess mechanism of Tricuspid regurgitation   2. Chronic atrial fibrillation s/p AV node ablation 1/2019  3. Emergent aortic dissection repair 1/4/19. Prolonged hospitalization with tracheostomy after her repair  4. Hypertension - controlled  5. Obesity   6. Mild mitral regurgitation  7. Severe tricuspid regurgitation    Plan:  1. Recommend scheduling anesthesia to be present during ALEX due to history of tracheostomy with difficult wean and history of mild dysphagia with dry food (no known choking, aspiration or esophageal disorder).   2. The structural team participating in the clasp 2 trial we will follow-up on her transesophageal echo result and make further recommendations.    The risks and benefits of ALEX has been discussed with the patient and/or relatives in detail including risk of death, esophageal tear, pharyngeal hematoma, methemoglobinemia, or other gastrointestinal or oral injury resulting in hemorrhage. The patient is willing to proceed.      HPI:     Priya Funez is a very nice 82 year old woman here for preprocedure evaluation prior to a transesophageal echo.  She has severe tricuspid regurgitation and is being considered for the clasp 2 trial for percutaneous treatment of her severe tricuspid regurgitation.  She been recommended to have a transesophageal echocardiogram to further evaluate the mechanism of her tricuspid valve and specifically evaluate the role her pacemaker lead is playing in the TR.      She has a pertinent history of with history of an emergent aortic dissection repair in 2019 with a prolonged hospitalization after her repair and difficult ventilator wean requiring tracheostomy.  She also has chronic A-fib and has had an AV node ablation and pacemaker placement. in 2019    From a heart failure standpoint she is doing  well at this time.  She tries to stay active and follow a low-sodium diet and is taking all of her medications.  Her weight at home is stable at 155 pounds.  Recently her dyspnea has been well controlled and she has no chest pain.  She has no edema on her current diuretic regimen.    She has no known esophageal disorder but does state that sometimes it feels like dry food sticks in her throat a little bit for the past few years.  She has no known history of esophageal varices or restriction or gastric ulcer.  She has good range of motion in her neck and no symptoms of cervical arthritis.      CURRENT MEDICATIONS:  Current Outpatient Medications   Medication Sig Dispense Refill     Acetaminophen 325 MG CAPS Take 325-650 mg by mouth every 4 hours as needed       albuterol (PROAIR HFA/PROVENTIL HFA/VENTOLIN HFA) 108 (90 Base) MCG/ACT inhaler Inhale 2 puffs into the lungs every 6 hours as needed for shortness of breath / dyspnea or wheezing 18 g 0     aspirin (ASA) 81 MG EC tablet Take 81 mg by mouth every 24 hours       cholecalciferol 25 MCG (1000 UT) TABS Take 1,000 Units by mouth daily        cyanocobalamin (VITAMIN B-12) 100 MCG tablet Take 1,000 mcg by mouth daily        empagliflozin (JARDIANCE) 10 MG TABS tablet Take 1 tablet (10 mg) by mouth daily 90 tablet 1     escitalopram (LEXAPRO) 10 MG tablet Take 1 tablet (10 mg) by mouth daily 90 tablet 3     metoprolol tartrate (LOPRESSOR) 25 MG tablet Take 1 tablet (25 mg) by mouth 2 times daily 180 tablet 3     mirtazapine (REMERON) 15 MG tablet Take 1 tablet (15 mg) by mouth At Bedtime 90 tablet 3     spironolactone (ALDACTONE) 25 MG tablet Take 1 tablet (25 mg) by mouth daily 90 tablet 3     torsemide (DEMADEX) 10 MG tablet Take 4 tablets(40mg) by mouth every day 360 tablet 3     warfarin ANTICOAGULANT (COUMADIN) 2 MG tablet Take one tablet (2 mg) by mouth on Fridays; take two tablets (4 mg) all other days; or as instructed by ACC team. 170 tablet 1        ALLERGIES     Allergies   Allergen Reactions     Amoxicillin Swelling     Face and body     Ciprofloxacin Hives       PAST MEDICAL, SURGICAL, FAMILY, SOCIAL HISTORY:  History was reviewed and updated as needed, see medical record.    REVIEW OF SYSTEMS:  Skin:  Negative     Eyes:  Positive for glasses  ENT:  Negative    Respiratory:  Positive for dyspnea at rest  Cardiovascular:    fatigue;Positive for  Gastroenterology: Negative    Genitourinary:  Negative    Musculoskeletal:  Negative    Neurologic:  Negative    Psychiatric:  Positive for anxiety;depression  Heme/Lymph/Imm:  Negative    Endocrine:  Negative      PHYSICAL EXAM:      BP: 114/66 Pulse: 80            Vital Signs with Ranges  Pulse:  [80] 80  BP: (114)/(66) 114/66  155 lbs 12.8 oz    Constitutional: awake, alert, no distress  Eyes: sclera nonicteric  ENT: trachea midline  Respiratory: Lungs are clear bilaterally  Cardiovascular: Regular 2 out of 6 systolic murmur loudest at left lower sternal border and louder with inspiration  GI: nondistended, nontender, bowel sounds present  Skin: dry, no rash no ankle or pedal edema at this time  Musculoskeletal: grossly normal muscle  tone for age  Neurologic: no  gross focal deficits  Neuropsychiatric: normal affect    Recent Lab Results:  LIPID RESULTS:  Lab Results   Component Value Date    CHOL 134 06/14/2021    HDL 53 06/14/2021    LDL 67 06/14/2021    TRIG 71 06/14/2021       LIVER ENZYME RESULTS:  Lab Results   Component Value Date    AST 27 04/04/2019    ALT 22 06/14/2021       CBC RESULTS:  Lab Results   Component Value Date    WBC 5.3 06/14/2021    RBC 4.33 06/14/2021    HGB 12.6 03/11/2022    HGB 12.8 06/14/2021    HCT 40.1 06/14/2021    MCV 93 06/14/2021    MCH 29.6 06/14/2021    MCHC 31.9 06/14/2021    RDW 13.5 06/14/2021     06/14/2021       BMP RESULTS:  Lab Results   Component Value Date     03/23/2023     06/23/2021    POTASSIUM 3.8 03/23/2023    POTASSIUM 3.8 12/13/2022     POTASSIUM 4.5 06/23/2021    CHLORIDE 99 03/23/2023    CHLORIDE 99 12/13/2022    CHLORIDE 104 06/23/2021    CO2 34 (H) 03/23/2023    CO2 33 (H) 12/13/2022    CO2 31 06/23/2021    ANIONGAP 7 03/23/2023    ANIONGAP 7 12/13/2022    ANIONGAP 3 06/23/2021    GLC 76 03/23/2023    GLC 84 12/13/2022    GLC 86 06/23/2021    BUN 33.0 (H) 03/23/2023    BUN 31 (H) 12/13/2022    BUN 30 06/23/2021    CR 1.34 (H) 03/23/2023    CR 1.29 (H) 06/23/2021    GFRESTIMATED 40 (L) 03/23/2023    GFRESTIMATED 39 (L) 06/23/2021    GFRESTBLACK 46 (L) 06/23/2021    BESS 9.7 03/23/2023    BESS 9.2 06/23/2021        A1C RESULTS:  Lab Results   Component Value Date    A1C 5.6 01/30/2019    A1C 5.3 01/04/2019       INR RESULTS:  Lab Results   Component Value Date    INR 1.7 (H) 05/31/2023    INR 4.9 (H) 05/23/2023    INR 2.43 (H) 03/11/2022    INR 2.30 (H) 07/09/2021    INR 2.40 (H) 06/10/2021

## 2023-06-08 NOTE — PROGRESS NOTES
ANTICOAGULATION MANAGEMENT     Priya Funez 82 year old female is on warfarin with therapeutic INR result. (Goal INR 2.0-3.0)    Recent labs: (last 7 days)     06/08/23  1237   INR 2.05*       ASSESSMENT       Source(s): Chart Review    Previous INR was Subtherapeutic    Medication, diet, health changes since last INR chart reviewed - seen today for CLASP II TR Screening/Baseline visit         PLAN     Unable to reach Priya today.    Left message to continue current dose of warfarin 4 mg tonight. Request call back for assessment.    Follow up required to confirm warfarin dose taken and assess for changes    Lucretia Go, RN  Anticoagulation Clinic  6/8/2023

## 2023-06-08 NOTE — PROGRESS NOTES
CLASP II TR Inclusion/ Exclusion Criteria     Sagastume OZZIE TrAnScatheter Valve RePair System Pivotal Clinical Trial (CLASP II TR): A prospective, multicenter, randomized, controlled pivotal trial to evaluate the safety and effectiveness of transcatheter tricuspid valve repair with the Sagastume OZZIE Transcatheter Valve Repair System and optimal medical therapy (OMT) compared to OMT alone in patients with tricuspid regurgitation.     Patient must meet all inclusion criteria and no exclusion for participation    *Note should be co-signed by PI*    1. Eighteen (18) years of age or older {YES / NO:644220}   2.   Despite medical therapy (OMT) per the local Heart Team, patient has signs of TR, symptoms from TR, or prior heart failure hospitalization from TR. Patient must be on OMT per the local heart team at the time of TR assessment for trial eligibility (TTE). OMT includes stable oral diuretic medications unless patient has a documented history of intolerance.     Oral diuretic doses must be stable (no decrease of more than   or increase of 2x) in the 7 days prior to baseline/screening TTE. Efforts should be made to keep oral diuretic doses stable after the TTE.     Signs of TR include, but are not limited to, peripheral edema or ascites. Symptoms of TR include, but are not limited to, patient classification as NYHA class II, III, or Codi     {YES / NO:144751}   3.   Functional or degenerative TR graded as severe or greater9 on a TTE assessed by the echo core lab. (TTE 60 days from submission) {YES / NO:396886}   4.   New York Heart Association (NYHA) Class II-Codi or heart failure hospitalization in the prior 12 months  {YES / NO:151466}   5.   Patient is at an intermediate or greater estimated risk of mortality with tricuspid valve surgery as determined by the cardiac surgeon with concurrence by the local Heart Team.  {YES / NO:587684}   6.   Patient is able and willing to give informed consent, follow protocol  procedures, and comply with follow-up visit requirements.  {YES / NO:196262}       1. Tricuspid valve anatomy not evaluable by TTE or ALEX  {YES / NO:028563}   2. Tricuspid valve anatomy precludes proper device deployment and function, including but not limited to:     Evidence of moderate to severe calcification in the grasping area     b. Excessive chordae structure in the grasping area     c. Presence of perforation in the grasping area     d. Severe leaflet tethering or immobile leaflet          (Assessed by core lab) {YES / NO:585389}   3.    In the opinion of the investigator, access to the right atrium with a 22 FR guide is deemed not feasible (e.g. DVT, occluded femoral veins)  {YES / NO:356378}   4.    LVEF ? 25% (Assessed by core lab per screening/baseline TTE)  {YES / NO:535901}   5.    Significant  intra-cardiac mass, thrombus, or vegetation  (Chronic scarring of th LV apex or chronic CHERYL thrombi can be considered for inclusion. Assess by core lab) {YES / NO:404716}   6.    Echocardiographic evidence of severe right ventricular dysfunction (Assessed by core lab per screening/baseline TTE) {YES / NO:173139}   7.    Primary non-degenerative tricuspid disease (e.g. carcinoid, rheumatic, endocarditis, iatrogenic, tricuspid stenosis)  {YES / NO:154732}   8.    Previous tricuspid valve repair or replacement that would interfere with placement of OZZIE  {YES / NO:780787}   9.    Presence of trans-tricuspid pacemaker or defibrillator leads which meet one of the following:     Would prevent proper TR reduction due to interaction of the lead with the leaflets     Were implanted in the RV   (Implanted in the RV within the last 90 days prior to the point of enrollment. Patients with RV lead extraction are required to wait 30 days prior to being assess for the study. Assessed by echo core lab. ) {YES / NO:223454}   10.  Severe aortic, mitral and/or pulmonic valve stenosis and/or regurgitation.   (Patients with these  concomitant conditions may opt to have the associated valve treated and be assess for the trial. Patients who will receive or have recently received valve treatent for these conditions must wait 60 days post-transcatheter or 90 days post-surgical treatment prior to being assess for the study.) {YES / NO:310496}   11.   Known history of untreated severe symptomatic carotid stenosis (>50% by ultrasound) or asymptomatic carotid stenosis (>70% by ultrasound)   {YES / NO:941688}   12.   Suspicion of pericardial adhesions that may preclude leaflet approximation (e.g. post pericarditis, constrictive pericarditis, calcifications visible on imaging)  {YES / NO:657801}   13.   Hemodynamically significant pericardial effusion  {YES / NO:725493}   14.   Presence of infiltrative cardiomyopathy or valvulopathy (including carcinoid, amyloidosis, sarcoidosis, hemochromatosis) significant congenital heart disease, including but not limited to atrial septal defect, RV dysplasia, and arrhythmogenic RV  {YES / NO:138180}   15.   Clinically significant, untreated coronary artery disease requiring revascularization, unstable angina, evidence of acute coronary syndrome, recent myocardial infarction (per WHO definition)   (MI within 30 days prior to the point of enrollment) {YES / NO:469347}   16.   Active gastrointestinal (GI) bleeding (Within 90 days prior to the point of enrollment) {YES / NO:873881}   17.   Any of the following cardiovascular procedures:            A. Percutaneous coronary, intracardiac, or endovascular intervention within the last 30 days prior to the point of enrollment         B. Carotid surgery within 30 days prior to the point of enrollment         C. Direct current cardioversion within the last 30 days prior to the point of enrollment         D. Leadless RV pacemaker implant within the last 30 days prior to the point of enrollment         E. Cardiac surgery within 90 days prior to the point of enrollment      (Intracardiac intervention includes, but is not limited to: left atrial appendage occlusion, ASD closure, AF ablation) {YES / NO:065546}   18.   Need for emergent or urgent surgery for any reason, any planned cardiac surgery within the next 12 months(365 days), or any planned percutaneous cardiac procedure within the next 90 days (From the point of enrollment) {YES / NO:326875}   19.   Hypotension (systolic pressure < 90 mmHg) or requirement for inotropic support or hemodynamic support device. (Within 30 days prior to the point of enrollment. Qualifying TTE must be performed in absence of inotropic suport or hemodynamic support device)  {YES / NO:972708}   20.   Resting systolic blood pressure < 90 or > 160 mmHg after repeated measurements   (Repeated = 3 consecutive measurements) {YES / NO:158731}   21.   Any of the following:         - PASP >60mmHg by echo doppler (unless RHC demonstrates PASP ? 70 mmHg        -PASP > 70 mmHg by RHC         - PVR >5 wood units by RHC (unless PVR ?5 Wood units and systolic B >85 mmHg after vasodilator challenge)  (PASP by echo doppler will be assessed by echo core lab. RHC assessed by site within 90 days of submission for screening) {YES / NO:630039}   22.   Patient with refractory heart failure requiring, advanced intervention (i.e. patient has or will need left ventricular assist device, transplantation) (ACC/AHA Stage D heart failure)  {YES / NO:467215}   23.   Deep vein thrombosis (DVT) or pulmonary embolism (PE) in the past 180 days (Within 180 days prior to signing initial informed consent) {YES / NO:948902}   24.   In the opinion of the investigator, access to and through the femoral vein/IVC with guide sheath is deemed not feasible (e.g., occlude femoral veins, occluded or thrombosed IVC filter)  {YES / NO:706860}   25.   Recent stroke (Within 90 days prior to procedure date) {YES / NO:092618}   26.   Modified Maury Scale ? 4 disability (For patients with history of  stroke) {YES / NO:036930}   27.   Kidney dysfunction with estimated glomerular filtration rate (eGFR) ? 25 mL/min/1.73m2 or patient is on chronic dialysis (At the point of enrollment) {YES / NO:982462}   28.   Patients with hepatic insufficiency or cirrhosis with Child-Riggins score Class C (At the point of enrollment) {YES / NO:095584}   29.   COPD requiring continuous home oxygen  {YES / NO:047644}   30.   Chronic anemia with transfusion dependency or (Hgb < 9 g/dL) not corrected by transfusion (At the point of enrollment) {YES / NO:317194}   31.   Thrombocytopenia (Platelet count < 75,000/mm3) or thrombocytosis (Platelet count > 750,000/mm3) (At the point of enrollment) {YES / NO:086656}   32.   Known bleeding or clotting disorders or patient refuses blood transfusion  {YES / NO:883435}   33.   Active endocarditis within the last 90 days or infection, requiring antibiotic therapy (oral or intravenous) within the last 14 days. (Within 90 days or 14 days prior to the point of enrollment respectively. Patient should also be more than 14 days from last dose of antibiotics with no evidence of active infection prior to an implant procedure, as applicable) {YES / NO:284739}   34.   Pregnant, breastfeeding, or planning pregnancy within the next 12 months (365 days). ( As assessed during the screening process. Note: Female patients of child bearing potential need to have a negative pregnancy test performed within 14 days prior to study procedure (if applicable) and be adherent to an accepted method of contraception ) {YES / NO:047557}   35.   Patients in whom a ALEX is contraindicated or cannot be completed {YES / NO:600769}   36.   Untreatable hypersensitivity or contraindication to any of the following:     All oral anticoagulant and antiplatelet therapy     Heparin and Bivalirudin      Nitinol Alloys (Nickel and Titanium)     Contrast media   (Patient must be able to tolerate at least one antiplatelet medication OR one  anticoagulant medication) {YES / NO:637944}   37.   Patient is currently participating in another investigational biologic, drug, or device clinical study  {YES / NO:463545}   38.   Concurrent medical condition with a life expectancy of less than 12 months (365 days) in the judgment of the Investigator.  {YES / NO:928029}   39.  Any condition, in the opinion of the investigator, making it unlikely the patient will be able to complete all protocol procedures and follow-up visits.   {YES / NO:387124}   40.   Patient has other medical, social, or psychological conditions that preclude appropriate consent and follow-up, or patient is under guardianship.  {YES / NO:855460}   41.   Any patient considered to be vulnerable (A vulnerable patient is an individual who is unable to fully understand all aspects of the investigation that are relevant to the decision to participate, or who could be manipulated or unduly influenced as a result of a compromised position, expectation of benefits or fear of retaliatory response (ISO 95901) ) {YES / NO:069576}       Sierra R. Behr-Holewinski

## 2023-06-08 NOTE — PROGRESS NOTES
"CLASP II TR Screening/Baseline    Purpose: Sagastume OZZIE TrAnScatheter Valve RePair System Pivotal Clinical Trial (CLASP II TR): A prospective, multicenter, randomized, controlled pivotal trial to evaluate the safety and effectiveness of transcatheter tricuspid valve repair with the Sagastume OZZIE Transcatheter Valve Repair System and optimal medical therapy (OMT) compared to OMT alone in patients with tricuspid regurgitation.       Priya Funez was seen in clinic today for the CLASP II TR Screening/Baseline visit.    Demography  Age 82 year old    Gender female    Ethnicity White   Race White        /74 (BP Location: Right arm, Patient Position: Sitting, Cuff Size: Adult Regular)   Pulse 89   Temp 97.7  F (36.5  C)   Resp 16   Ht 1.6 m (5' 3\")   Wt 70.8 kg (156 lb)   BMI 27.63 kg/m      Current Outpatient Medications   Medication    Acetaminophen 325 MG CAPS    albuterol (PROAIR HFA/PROVENTIL HFA/VENTOLIN HFA) 108 (90 Base) MCG/ACT inhaler    aspirin (ASA) 81 MG EC tablet    cholecalciferol 25 MCG (1000 UT) TABS    cyanocobalamin (VITAMIN B-12) 100 MCG tablet    empagliflozin (JARDIANCE) 10 MG TABS tablet    escitalopram (LEXAPRO) 10 MG tablet    metoprolol tartrate (LOPRESSOR) 25 MG tablet    mirtazapine (REMERON) 15 MG tablet    spironolactone (ALDACTONE) 25 MG tablet    torsemide (DEMADEX) 10 MG tablet    warfarin ANTICOAGULANT (COUMADIN) 2 MG tablet     No current facility-administered medications for this visit.         Edema Grading Questionaire  Lower Limb Edema Grading: [x] Absent   [] Grade 1+ (2 mm or less: slight pitting, no visible distortion, disappears rapidly)  []Grade 2+ (2-4 mm indent: somewhat deeper pit, no readably detectable distortion, disappears in 10-25 seconds)  [] Grade 3+ (4-6 mm: pit is noticeably deep. May last more than a minute. Dependent extremity looks swollen and rowley)  []Grade 4+ (6-8 mm: pt is very deep. Lasts for 2-5 minutes. Dependent extremity is grossly " distorted)   Ankle Circumference Measurements: Right Ankle Circumference: 23 cm  Left Ankle Circumference: 23.5 cm      Over the last week, how often did you have swelling in your ankles?  [] Every day   [] 6 days    [] 5 days   []4 days    [] 3 days    []2 days   []1 day   [x] Not at all    2. What level of swelling did you experience? [] Extreme swelling    [] Quite a bit of swelling   [] Mild swelling    [] Very little swelling   [x] None    3. How much were you bothered by swelling in your ankles?  [] Extremely    [] Quite a bit    [] Moderately   [] A little    [x] Not at all    4. During what times of day did you have swelling in your ankles? [] Morning only    [] afternoon only    [] evening only  [] Morning & evening    [] morning & afternoon  [] afternoon & evening   [] morning, afternoon & evening  [x] Not at all    5. Did swelling in your ankles limit your normal activity? [] Extremely   [] Quite a bit  [] Moderately  [] A little  [x] Not at all    6. Did you have any difficulty answering these questions?  [] Yes  [x] No       VALENZUELA ADL   Bathing [x]Independent - 1 point  [] Dependent - 0 points   Dressing [x]Independent - 1 point  [] Dependent - 0 points    Toileting [x]Independent - 1 point  [] Dependent - 0 points   Transferring [x]Independent - 1 point  [] Dependent - 0 points   Continence [x]Independent - 1 point  [] Dependent - 0 points   Feeding [x]Independent - 1 point  [] Dependent - 0 points   Total Score: 6/6          Cardiovascular Medical History Part 1  Tricuspid Valve Disease Etiology (primary, secondary, mixed, pacer related, indeterminate)     Number of Hospitalization for heart failure in the last 12 months    Number of days hospitalized for heart failure in the last 12 months    Date of last hospitalization for heart failure within the last 12 months    Aneurysm  No   Angina No   Atrial Septal Defect No   Ventricular Septal Defect No   Cardiac Valve Disease (severe stenosis or  regurgitation -- what valves)  Yes: TR 4+,    Cardiomyopathy -- specify  No   Carotid artery disease (greater than 50%) No   Cardiogenic Shock (if yes, date) No   Coronary Artery disease (greater than 50%)    Deep vein thrombosis (DVT)(If yes, date) No   Dyslipidemia or hyperlipidemia No   Endocarditis (If yes date and date resolved) No   Hypotension (chronic, systolic BP <90 mmHg or diastolic < 60 mmHg) No   Hypertension (treated) Yes: controlled   Pulmonary Hypertension within past year (PASP) Yes        - If yes, PASP within past year        - Measured by what?        Cardiovascular Medical History Part 2  Myocardial infarction (If yes, date -- including STEMI, NSTEMI, or UNK) No   Peripheral Artery disease No   Pulmonary Embolism (if yes date) No   Rheumatic Heart Disease No   Stroke (if yes, etiology and date) No   Transient Ischemic Attack (if yes, date) No   Atrial fibrillation (If yes, specify) Yes   Atrial Flutter  Yes   Bradycardia (less than 60bpm) No   Ventricular tachycardia or fibrillation No   Conduction Defects:     1st degree AV Block No   2nd degree AV Block No   3rd degree AV block (complete heart block) No   LBBB No   RBBB No   Bifascicular block No   Long QT syndrome No   Any other significant arrhythmia/conduction defects No   Other relevant cardiovascular conditions No       Cardiovascular Interventions and Surgical History Part 1  Carotid artery stenting (if yes, date) No   Pacemaker/ICD (If yes, type and date) Yes: 1/11/2019   Leadless pacemaker or ICD  No   Cardiac Resynchronization therapy (CRT) (If yes, date) No   PCI/Stent (if yes location and date) No   Coronary Artery Bypass graft (CABG) Surgery (If yes locations and date) Yes, aorta        Cardiovascular Interventions and Surgical History Part 2  Heart Transplant  No   Is the patient currently listed for a heart transplant?  No   Mitral Valve Surgery/Intervention (If yes, specify and date) No   Aortic Valve Surgery/Intervention (If  yes, specify and date) Yes: aortic dissection 1/2019   Tricuspid Valve Surgery/Intervention (If yes, specify and date) No   Pulmonic Valve Surgery/Intervention (If yes, specify and date) No   Catheter ablation, direct current cardioversion  or other interventional EP procedure Yes, AV node 1/11/2019   ASD Closure No   VSD Closure No   IVC Filter No   TEVAR or EVAR No   MAZE or similar procedure No   Other relevant cardiovascular procedures or surgeries? (if yes, specify) Yes: Aortic Dissection     Non-Cardiovascular Medical History  Ascites  No   Hospitalization and/or ED visit requiring paracentesis in the last 12 months? No   Chronic Obstructive Pulmonary Disease (COPD) (If Yes, Severity)  No   Sleep Apnea No   Other chronic lung disease (if yes, specify) No   Home oxygen use (if yes, specify) No   Pneumonia (if yes, recent or remote) No   Diabetes (if yes, type) No   Hyperthyroidism  No   Hypothyroidism No   Renal Insuffiencey or failure (if yes, stage, dialysis?) No   Gastrointestinal or esophogeal bleeding (if yes, date) No   Cirrhosis (If yes, child-Riggins score or MELD Score if available) No   Other liver diease (if yes, specify) No   Cancer/Malignancy (If yes, type, chest radiation and cancer status) No   Coagulopathy (bleeding or clotting disorder, if yes specify) No   Chronic anemia (<= 9 g/dL) {No   Thrombocytopenia: moderate or greater (Plt <=100,000/uL) No   Dementia (If yes, severity) No   Autoimmune disorder No   Immunocompromised No   History of Falls?  No   COVID-19 Yes: April 2023   Other non cardiovascular conditions (if yes, specify) No   Tobacco use (former, current or never) no   Alcohol consumption (>1 drink per day in past 12 months) No   Illicit or recreatational drug use (no, recent or remote) No       Plan: ALEX and TTE to happen on 6/16/2023.     Sierra R. Behr-Holewinski

## 2023-06-08 NOTE — PROGRESS NOTES
"   PLAN     Unable to reach Priya today x 2 attempts.  reports patient \"is out with the neighbor, but will call back tomorrow.\"    No instructions provided. Unable to leave voicemail.     Follow up required to confirm warfarin dose taken and assess for changes    Lucretia Go RN  [facility]  6/8/2023      "

## 2023-06-08 NOTE — PROGRESS NOTES
CLASP II TR Study Consent Visit    Purpose: Sagastume OZZIE TrAnScatheter Valve RePair System Pivotal Clinical Trial (CLASP II TR): A prospective, multicenter, randomized, controlled pivotal trial to evaluate the safety and effectiveness of transcatheter tricuspid valve repair with the Sagastume OZZIE Transcatheter Valve Repair System and optimal medical therapy (OMT) compared to OMT alone in patients with tricuspid regurgitation.     Priya Funez a 82 year old female , was seen in clinic today to discuss participation in the CLASP II TR study.     The consent discussion began on 5/18/2023.  The consent form was reviewed with the patient and .     The consent discussion included:    Study description and purpose     Conflict of interest    Device description     Randomization  (2:1 investigational device:OMT)    Study visits    Study medications    Risks of participation    Benefits (if any)    Alternatives    Voluntary participation    Confidentiality     Compensation/costs of participation    Injury and legal rights    The subject was provided time to review the consent form and consider participation. her questions were answered to her satisfaction. The patient has voluntarily agreed to participate in the above noted study.     The consent form version 23 May 2023 and HIPPA form was signed 06/08/23.     The subject was provided with a copy of the consent form and HIPPA.    Plan: Baseline visit to happen today. ALEX and TTE to happen 6/16/23 with Dr. Meier and anesthesia team.      Sierra R. Behr-Holewinski

## 2023-06-08 NOTE — Clinical Note
6/8/2023    Sharmaine Burks MD  [address] [facility] [address]    RE: Priya Funez       Dear Colleague,     I had the pleasure of seeing Priya Funez in the [facility].  CLASP II TR Study Consent Visit    Purpose: Sagastume OZZIE TrAnScatheter Valve RePair System Pivotal Clinical Trial (CLASP II TR): A prospective, multicenter, randomized, controlled pivotal trial to evaluate the safety and effectiveness of transcatheter tricuspid valve repair with the Sagastume OZZIE Transcatheter Valve Repair System and optimal medical therapy (OMT) compared to OMT alone in patients with tricuspid regurgitation.     Priya Funez a 82 year old female , was seen in clinic today to discuss participation in the CLASP II TR study.     The consent discussion began on 5/18/2023.  The consent form was reviewed with the patient and .     The consent discussion included:    Study description and purpose     Conflict of interest    Device description     Randomization  (2:1 investigational device:OMT)    Study visits    Study medications    Risks of participation    Benefits (if any)    Alternatives    Voluntary participation    Confidentiality     Compensation/costs of participation    Injury and legal rights    The subject was provided time to review the consent form and consider participation. her questions were answered to her satisfaction. The patient has voluntarily agreed to participate in the above noted study.     The consent form version 23 May 2023 and HIPPA form was signed 06/08/23.     The subject was provided with a copy of the consent form and HIPPA.    Plan: Baseline visit to happen today. ALEX and TTE to happen 6/16/23 with Dr. Meier and anesthesia team.      @[email or web address]@     CLASP II TR Inclusion/ Exclusion Criteria     Sagastume OZZIE TrAnScatheter Valve RePair System Pivotal Clinical Trial (CLASP II TR): A prospective, multicenter, randomized, controlled pivotal trial to evaluate the  safety and effectiveness of transcatheter tricuspid valve repair with the Sagastume OZZIE Transcatheter Valve Repair System and optimal medical therapy (OMT) compared to OMT alone in patients with tricuspid regurgitation.     Patient must meet all inclusion criteria and no exclusion for participation    *Note should be co-signed by PI*    1. Eighteen (18) years of age or older {YES / NO:967495}   2.   Despite medical therapy (OMT) per the local Heart Team, patient has signs of TR, symptoms from TR, or prior heart failure hospitalization from TR. Patient must be on OMT per the local heart team at the time of TR assessment for trial eligibility (TTE). OMT includes stable oral diuretic medications unless patient has a documented history of intolerance.     Oral diuretic doses must be stable (no decrease of more than   or increase of 2x) in the 7 days prior to baseline/screening TTE. Efforts should be made to keep oral diuretic doses stable after the TTE.     Signs of TR include, but are not limited to, peripheral edema or ascites. Symptoms of TR include, but are not limited to, patient classification as NYHA class II, III, or Codi     {YES / NO:834447}   3.   Functional or degenerative TR graded as severe or greater9 on a TTE assessed by the echo core lab. (TTE 60 days from submission) {YES / NO:995160}   4.   New York Heart Association (NYHA) Class II-Codi or heart failure hospitalization in the prior 12 months  {YES / NO:042276}   5.   Patient is at an intermediate or greater estimated risk of mortality with tricuspid valve surgery as determined by the cardiac surgeon with concurrence by the local Heart Team.  {YES / NO:193326}   6.   Patient is able and willing to give informed consent, follow protocol procedures, and comply with follow-up visit requirements.  {YES / NO:309235}       1. Tricuspid valve anatomy not evaluable by TTE or ALEX  {YES / NO:633422}   2. Tricuspid valve anatomy precludes proper device deployment  and function, including but not limited to:     Evidence of moderate to severe calcification in the grasping area     b. Excessive chordae structure in the grasping area     c. Presence of perforation in the grasping area     d. Severe leaflet tethering or immobile leaflet          (Assessed by core lab) {YES / NO:004398}   3.    In the opinion of the investigator, access to the right atrium with a 22 FR guide is deemed not feasible (e.g. DVT, occluded femoral veins)  {YES / NO:304376}   4.    LVEF ? 25% (Assessed by core lab per screening/baseline TTE)  {YES / NO:747979}   5.    Significant  intra-cardiac mass, thrombus, or vegetation  (Chronic scarring of th LV apex or chronic CHERYL thrombi can be considered for inclusion. Assess by core lab) {YES / NO:995851}   6.    Echocardiographic evidence of severe right ventricular dysfunction (Assessed by core lab per screening/baseline TTE) {YES / NO:381284}   7.    Primary non-degenerative tricuspid disease (e.g. carcinoid, rheumatic, endocarditis, iatrogenic, tricuspid stenosis)  {YES / NO:253034}   8.    Previous tricuspid valve repair or replacement that would interfere with placement of OZZIE  {YES / NO:833707}   9.    Presence of trans-tricuspid pacemaker or defibrillator leads which meet one of the following:     Would prevent proper TR reduction due to interaction of the lead with the leaflets     Were implanted in the RV   (Implanted in the RV within the last 90 days prior to the point of enrollment. Patients with RV lead extraction are required to wait 30 days prior to being assess for the study. Assessed by echo core lab. ) {YES / NO:563594}   10.  Severe aortic, mitral and/or pulmonic valve stenosis and/or regurgitation.   (Patients with these concomitant conditions may opt to have the associated valve treated and be assess for the trial. Patients who will receive or have recently received valve treatent for these conditions must wait 60 days post-transcatheter  or 90 days post-surgical treatment prior to being assess for the study.) {YES / NO:944613}   11.   Known history of untreated severe symptomatic carotid stenosis (>50% by ultrasound) or asymptomatic carotid stenosis (>70% by ultrasound)   {YES / NO:705621}   12.   Suspicion of pericardial adhesions that may preclude leaflet approximation (e.g. post pericarditis, constrictive pericarditis, calcifications visible on imaging)  {YES / NO:075540}   13.   Hemodynamically significant pericardial effusion  {YES / NO:218901}   14.   Presence of infiltrative cardiomyopathy or valvulopathy (including carcinoid, amyloidosis, sarcoidosis, hemochromatosis) significant congenital heart disease, including but not limited to atrial septal defect, RV dysplasia, and arrhythmogenic RV  {YES / NO:985232}   15.   Clinically significant, untreated coronary artery disease requiring revascularization, unstable angina, evidence of acute coronary syndrome, recent myocardial infarction (per WHO definition)   (MI within 30 days prior to the point of enrollment) {YES / NO:532778}   16.   Active gastrointestinal (GI) bleeding (Within 90 days prior to the point of enrollment) {YES / NO:158803}   17.   Any of the following cardiovascular procedures:            A. Percutaneous coronary, intracardiac, or endovascular intervention within the last 30 days prior to the point of enrollment         B. Carotid surgery within 30 days prior to the point of enrollment         C. Direct current cardioversion within the last 30 days prior to the point of enrollment         D. Leadless RV pacemaker implant within the last 30 days prior to the point of enrollment         E. Cardiac surgery within 90 days prior to the point of enrollment     (Intracardiac intervention includes, but is not limited to: left atrial appendage occlusion, ASD closure, AF ablation) {YES / NO:959107}   18.   Need for emergent or urgent surgery for any reason, any planned cardiac surgery  within the next 12 months(365 days), or any planned percutaneous cardiac procedure within the next 90 days (From the point of enrollment) {YES / NO:152752}   19.   Hypotension (systolic pressure < 90 mmHg) or requirement for inotropic support or hemodynamic support device. (Within 30 days prior to the point of enrollment. Qualifying TTE must be performed in absence of inotropic suport or hemodynamic support device)  {YES / NO:414905}   20.   Resting systolic blood pressure < 90 or > 160 mmHg after repeated measurements   (Repeated = 3 consecutive measurements) {YES / NO:598151}   21.   Any of the following:         - PASP >60mmHg by echo doppler (unless RHC demonstrates PASP ? 70 mmHg        -PASP > 70 mmHg by RHC         - PVR >5 wood units by RHC (unless PVR ?5 Wood units and systolic B >85 mmHg after vasodilator challenge)  (PASP by echo doppler will be assessed by echo core lab. RHC assessed by site within 90 days of submission for screening) {YES / NO:398201}   22.   Patient with refractory heart failure requiring, advanced intervention (i.e. patient has or will need left ventricular assist device, transplantation) (ACC/AHA Stage D heart failure)  {YES / NO:451036}   23.   Deep vein thrombosis (DVT) or pulmonary embolism (PE) in the past 180 days (Within 180 days prior to signing initial informed consent) {YES / NO:207588}   24.   In the opinion of the investigator, access to and through the femoral vein/IVC with guide sheath is deemed not feasible (e.g., occlude femoral veins, occluded or thrombosed IVC filter)  {YES / NO:481898}   25.   Recent stroke (Within 90 days prior to procedure date) {YES / NO:870976}   26.   Modified [place] Scale ? 4 disability (For patients with history of stroke) {YES / NO:955452}   27.   Kidney dysfunction with estimated glomerular filtration rate (eGFR) ? 25 mL/min/1.73m2 or patient is on chronic dialysis (At the point of enrollment) {YES / NO:607617}   28.   Patients with hepatic  insufficiency or cirrhosis with Child-Riggins score Class C (At the point of enrollment) {YES / NO:294675}   29.   COPD requiring continuous home oxygen  {YES / NO:639085}   30.   Chronic anemia with transfusion dependency or (Hgb < 9 g/dL) not corrected by transfusion (At the point of enrollment) {YES / NO:818410}   31.   Thrombocytopenia (Platelet count < 75,000/mm3) or thrombocytosis (Platelet count > 750,000/mm3) (At the point of enrollment) {YES / NO:112901}   32.   Known bleeding or clotting disorders or patient refuses blood transfusion  {YES / NO:665704}   33.   Active endocarditis within the last 90 days or infection, requiring antibiotic therapy (oral or intravenous) within the last 14 days. (Within 90 days or 14 days prior to the point of enrollment respectively. Patient should also be more than 14 days from last dose of antibiotics with no evidence of active infection prior to an implant procedure, as applicable) {YES / NO:296743}   34.   Pregnant, breastfeeding, or planning pregnancy within the next 12 months (365 days). ( As assessed during the screening process. Note: Female patients of child bearing potential need to have a negative pregnancy test performed within 14 days prior to study procedure (if applicable) and be adherent to an accepted method of contraception ) {YES / NO:373476}   35.   Patients in whom a ALEX is contraindicated or cannot be completed {YES / NO:420448}   36.   Untreatable hypersensitivity or contraindication to any of the following:     All oral anticoagulant and antiplatelet therapy     Heparin and Bivalirudin      Nitinol Alloys (Nickel and Titanium)     Contrast media   (Patient must be able to tolerate at least one antiplatelet medication OR one anticoagulant medication) {YES / NO:042221}   37.   Patient is currently participating in another investigational biologic, drug, or device clinical study  {YES / NO:538019}   38.   Concurrent medical condition with a life expectancy  "of less than 12 months (365 days) in the judgment of the Investigator.  {YES / NO:215216}   39.  Any condition, in the opinion of the investigator, making it unlikely the patient will be able to complete all protocol procedures and follow-up visits.   {YES / NO:526806}   40.   Patient has other medical, social, or psychological conditions that preclude appropriate consent and follow-up, or patient is under guardianship.  {YES / NO:845160}   41.   Any patient considered to be vulnerable (A vulnerable patient is an individual who is unable to fully understand all aspects of the investigation that are relevant to the decision to participate, or who could be manipulated or unduly influenced as a result of a compromised position, expectation of benefits or fear of retaliatory response (ISO 31591) ) {YES / NO:843254}       Sierra R. Behr-Holewinski      CLASP II TR Screening/Baseline    Purpose: Sagastume OZZIE TrAnScatheter Valve RePair System Pivotal Clinical Trial (CLASP II TR): A prospective, multicenter, randomized, controlled pivotal trial to evaluate the safety and effectiveness of transcatheter tricuspid valve repair with the Sagastume OZZIE Transcatheter Valve Repair System and optimal medical therapy (OMT) compared to OMT alone in patients with tricuspid regurgitation.       Priya Funez was seen in clinic today for the CLASP II TR Screening/Baseline visit.    Demography  Age 82 year old    Gender female    Ethnicity White   Race White      NYHA: ***    /74 (BP Location: Right arm, Patient Position: Sitting, Cuff Size: Adult Regular)   Pulse 89   Temp 97.7  F (36.5  C)   Resp 16   Ht 1.6 m (5' 3\")   Wt 70.8 kg (156 lb)   BMI 27.63 kg/m      Current Outpatient Medications   Medication     Acetaminophen 325 MG CAPS     albuterol (PROAIR HFA/PROVENTIL HFA/VENTOLIN HFA) 108 (90 Base) MCG/ACT inhaler     aspirin (ASA) 81 MG EC tablet     cholecalciferol 25 MCG (1000 UT) TABS     cyanocobalamin (VITAMIN " B-12) 100 MCG tablet     empagliflozin (JARDIANCE) 10 MG TABS tablet     escitalopram (LEXAPRO) 10 MG tablet     metoprolol tartrate (LOPRESSOR) 25 MG tablet     mirtazapine (REMERON) 15 MG tablet     spironolactone (ALDACTONE) 25 MG tablet     torsemide (DEMADEX) 10 MG tablet     warfarin ANTICOAGULANT (COUMADIN) 2 MG tablet     No current facility-administered medications for this visit.         Edema Grading Questionaire  Lower Limb Edema Grading: [] Absent   [] Grade 1+ (2 mm or less: slight pitting, no visible distortion, disappears rapidly)  []Grade 2+ (2-4 mm indent: somewhat deeper pit, no readably detectable distortion, disappears in 10-25 seconds)  [] Grade 3+ (4-6 mm: pit is noticeably deep. May last more than a minute. Dependent extremity looks swollen and rowley)  []Grade 4+ (6-8 mm: pt is very deep. Lasts for 2-5 minutes. Dependent extremity is grossly distorted)   Ankle Circumference Measurements: Right Ankle Circumference: 23 cm  Left Ankle Circumference: 23.5 cm      1. Over the last week, how often did you have swelling in your ankles?  [] Every day   [] 6 days    [] 5 days   []4 days    [] 3 days    []2 days   []1 day   [x] Not at all    2. What level of swelling did you experience? [] Extreme swelling    [] Quite a bit of swelling   [] Mild swelling    [] Very little swelling   [x] None    3. How much were you bothered by swelling in your ankles?  [] Extremely    [] Quite a bit    [] Moderately   [] A little    [x] Not at all    4. During what times of day did you have swelling in your ankles? [] Morning only    [] afternoon only    [] evening only  [] Morning & evening    [] morning & afternoon  [] afternoon & evening   [] morning, afternoon & evening  [x] Not at all    5. Did swelling in your ankles limit your normal activity? [] Extremely   [] Quite a bit  [] Moderately  [] A little  [x] Not at all    6. Did you have any difficulty answering these questions?  [] Yes  [x] No       VALENZUELA ADL    Bathing [x]Independent - 1 point  [] Dependent - 0 points   Dressing [x]Independent - 1 point  [] Dependent - 0 points    Toileting [x]Independent - 1 point  [] Dependent - 0 points   Transferring [x]Independent - 1 point  [] Dependent - 0 points   Continence [x]Independent - 1 point  [] Dependent - 0 points   Feeding [x]Independent - 1 point  [] Dependent - 0 points   Total Score: 6/6          Cardiovascular Medical History Part 1  Tricuspid Valve Disease Etiology (primary, secondary, mixed, pacer related, indeterminate)  ***   Number of Hospitalization for heart failure in the last 12 months ***   Number of days hospitalized for heart failure in the last 12 months ***   Date of last hospitalization for heart failure within the last 12 months ***   Aneurysm  No   Angina No   Atrial Septal Defect No   Ventricular Septal Defect No   Cardiac Valve Disease (severe stenosis or regurgitation -- what valves)  Yes: TR 4+, ***   Cardiomyopathy -- specify  No   Carotid artery disease (greater than 50%) No   Cardiogenic Shock (if yes, date) No   Coronary Artery disease (greater than 50%) {YES/NO:60}   Deep vein thrombosis (DVT)(If yes, date) No   Dyslipidemia or hyperlipidemia No   Endocarditis (If yes date and date resolved) No   Hypotension (chronic, systolic BP <90 mmHg or diastolic < 60 mmHg) No   Hypertension (treated) Yes: controlled   Pulmonary Hypertension within past year (PASP) Yes        - If yes, PASP within past year        - Measured by what?   ***     Cardiovascular Medical History Part 2  Myocardial infarction (If yes, date -- including STEMI, NSTEMI, or UNK) No   Peripheral Artery disease No   Pulmonary Embolism (if yes date) No   Rheumatic Heart Disease No   Stroke (if yes, etiology and date) No   Transient Ischemic Attack (if yes, date) No   Atrial fibrillation (If yes, specify) Yes   Atrial Flutter  No   Bradycardia (less than 60bpm) No   Ventricular tachycardia or fibrillation No   Conduction Defects:      1st degree AV Block No   2nd degree AV Block No   3rd degree AV block (complete heart block) No   LBBB No   RBBB No   Bifascicular block No   Long QT syndrome No   Any other significant arrhythmia/conduction defects No   Other relevant cardiovascular conditions No       Cardiovascular Interventions and Surgical History Part 1  Carotid artery stenting (if yes, date) No   Pacemaker/ICD (If yes, type and date) Yes: 1/2019   Leadless pacemaker or ICD  No   Cardiac Resynchronization therapy (CRT) (If yes, date) No   PCI/Stent (if yes location and date) No   Coronary Artery Bypass graft (CABG) Surgery (If yes locations and date) No       Cardiovascular Interventions and Surgical History Part 2  Heart Transplant  No   Is the patient currently listed for a heart transplant?  No   Mitral Valve Surgery/Intervention (If yes, specify and date) No   Aortic Valve Surgery/Intervention (If yes, specify and date) Yes: aortic dissection 1/2019   Tricuspid Valve Surgery/Intervention (If yes, specify and date) No   Pulmonic Valve Surgery/Intervention (If yes, specify and date) No   Catheter ablation, direct current cardioversion  or other interventional EP procedure No   ASD Closure No   VSD Closure No   IVC Filter No   TEVAR or EVAR No   MAZE or similar procedure No   Other relevant cardiovascular procedures or surgeries? (if yes, specify) Yes: Aortic Dissection     Non-Cardiovascular Medical History  Ascites  No   Hospitalization and/or ED visit requiring paracentesis in the last 12 months? No   Chronic Obstructive Pulmonary Disease (COPD) (If Yes, Severity)  No   Sleep Apnea No   Other chronic lung disease (if yes, specify) No   Home oxygen use (if yes, specify) No   Pneumonia (if yes, recent or remote) No   Diabetes (if yes, type) No   Hyperthyroidism  No   Hypothyroidism No   Renal Insuffiencey or failure (if yes, stage, dialysis?) No   Gastrointestinal or esophogeal bleeding (if yes, date) No   Cirrhosis (If yes, child-Riggins  score or MELD Score if available) No   Other liver diease (if yes, specify) No   Cancer/Malignancy (If yes, type, chest radiation and cancer status) No   Coagulopathy (bleeding or clotting disorder, if yes specify) No   Chronic anemia (<= 9 g/dL) {No   Thrombocytopenia: moderate or greater (Plt <=100,000/uL) No   Dementia (If yes, severity) No   Autoimmune disorder No   Immunocompromised No   History of Falls?  No   COVID-19 Yes: April 2023   Other non cardiovascular conditions (if yes, specify) No   Tobacco use (former, current or never) no   Alcohol consumption (>1 drink per day in past 12 months) No   Illicit or recreatational drug use (no, recent or remote) No       Plan: ALEX and TTE to happen on 6/16/2023.     Sierra R. Behr-Holewinski      Thank you for allowing me to participate in the care of your patient.      Sincerely,     Sierra R. Behr-Holewinski     [facility]  cc:   No referring provider defined for this encounter.

## 2023-06-09 PROBLEM — Z92.89 HISTORY OF BLOOD TRANSFUSION: Status: ACTIVE | Noted: 2023-01-01

## 2023-06-09 NOTE — PROGRESS NOTES
ASSESSMENT/PLAN:                                                      Priya Funez is a pleasant 82 year old female who presents for a preoperative evaluation. She is undergoing a low risk procedure. Her risk of major cardiac event is 1 point: 0.9%.    (Z01.818) Preoperative examination  (primary encounter diagnosis)  (I07.1) Severe tricuspid regurgitation  Plan: no additional work-up, cardiac or otherwise, indicated prior to surgery; HOLD Jardiance 3 days prior to surgery; HOLD torsemide and spironolactone on the morning of surgery; continue aspirin and Coumadin without pause.     RECOMMEND PROCEEDING WITH SURGERY: YES    Sharmaine Burks MD   [facility]  [address] [place] [id number]  T: [phone], F: [phone]    SUBJECTIVE:                                                      Priya Funez is a very pleasant 82 year old female who presents for preoperative evaluation.    Surgeon: Hardeep  Date: 6/16/2023  Location: [facility] [facility]  Surgery: transesophageal echocardiogram with anesthesia (low risk)  Indication: severe tricuspid regurgitation    Past Medical History:   Diagnosis Date     Benign essential hypertension      Chronic atrial fibrillation (H)     s/p AV node ablation and PPM placement January, 2019     Chronic diastolic heart failure (H)      Chronic kidney disease, stage III (moderate) (H)      Chronic right-sided heart failure (H)      NEDA (generalized anxiety disorder)      History of aortic dissection     s/p emergent repair 2019     History of blood transfusion     no adverse reactions     Severe tricuspid regurgitation      Personal or family history of excessive or prolonged bleeding? No  Personal or family history of blood clots? No  History of snoring/Sleep Apnea? No  History of blood transfusions? YES - no adverse reactions    Cardiovascular risk: chronic right-sided heart failure (1 point)    Risk of major cardiac event: 1 point: 0.9%    Past Surgical  History:   Procedure Laterality Date     ANGIOGRAM Right 01/04/2019    Procedure: DIAGONSTIC ANGIOGRAM OF SMA;  Surgeon: Rigo Jennings MD;  Location:  OR     BIOPSY BREAST       BYPASS GRAFT ARTERY CORONARY, REPAIR VALVE AORTIC, COMBINED N/A 01/04/2019    Procedure: AORTIC DISSECTION REPAIR WITH GRAFT- HEMASHIELD PLATINUM WOVEN DOUBLE VELOR 4 SIDE ARM VASCULAR GRAFT, D:35 X 12 X 10 X 10MM/ L:50CM;  Surgeon: Jose Winslow MD;  Location:  OR     EP ABLATION AV NODE N/A 01/11/2019    Procedure: EP Ablation AV Node;  Surgeon: Tsering Davey MD;  Location:  HEART CARDIAC CATH LAB     EP PACEMAKER Left 01/11/2019    Procedure: EP Pacemaker;  Surgeon: Tsering Davey MD;  Location:  HEART CARDIAC CATH LAB     THYROID SURGERY      benign mass removed     TRACHEOSTOMY N/A 01/25/2019    Procedure: TRACHEOSTOMY  (DR MCCLELLAND);  Surgeon: Bryson Mcclelland MD;  Location:  OR     TUBAL LIGATION       Artificial assistive devices, such as pacemakers, artificial cardiac valves, or artificial joints? YES - pacemaker    Allergies   Allergen Reactions     Amoxicillin Swelling     Face and body     Ciprofloxacin Hives     Personal or family history of allergy to anesthesia? No  Allergy to local or topical analgesics? No  Allergy to latex? No  Allergy to adhesives/skin preparations (chlorhexadine)? No    Current Outpatient Medications   Medication Sig     Acetaminophen 325 MG CAPS Take 325-650 mg by mouth every 4 hours as needed     albuterol (PROAIR HFA/PROVENTIL HFA/VENTOLIN HFA) 108 (90 Base) MCG/ACT inhaler Inhale 2 puffs into the lungs every 6 hours as needed for shortness of breath / dyspnea or wheezing     aspirin (ASA) 81 MG EC tablet Take 81 mg by mouth every 24 hours     cholecalciferol 25 MCG (1000 UT) TABS Take 1,000 Units by mouth daily      cyanocobalamin (VITAMIN B-12) 100 MCG tablet Take 1,000 mcg by mouth daily      empagliflozin (JARDIANCE) 10 MG TABS tablet Take 1 tablet (10 mg) by mouth  daily     escitalopram (LEXAPRO) 10 MG tablet Take 1 tablet (10 mg) by mouth daily     metoprolol tartrate (LOPRESSOR) 25 MG tablet Take 1 tablet (25 mg) by mouth 2 times daily     mirtazapine (REMERON) 15 MG tablet Take 1 tablet (15 mg) by mouth At Bedtime     spironolactone (ALDACTONE) 25 MG tablet Take 1 tablet (25 mg) by mouth daily     torsemide (DEMADEX) 10 MG tablet Take 4 tablets(40mg) by mouth every day     warfarin ANTICOAGULANT (COUMADIN) 2 MG tablet Take one tablet (2 mg) by mouth on Fridays; take two tablets (4 mg) all other days; or as instructed by ACC team.     Over the counter medications? Other than above, no  Vitamin Supplements? Other than above, no  Herbal Supplements? No     Family History   Problem Relation Age of Onset     Ataxia Mother      Heart Disease Father      Diabetes No family hx of      Myocardial Infarction No family hx of      Cerebrovascular Disease No family hx of      Coronary Artery Disease Early Onset No family hx of      Breast Cancer No family hx of      Colon Cancer No family hx of      Ovarian Cancer No family hx of      Social History     Occupational History     Occupation: Retired - customer service   Tobacco Use     Smoking status: Never     Smokeless tobacco: Never   Vaping Use     Vaping status: Never Used   Substance and Sexual Activity     Alcohol use: Not Currently     Drug use: No     Sexual activity: Not on file   Social History Narrative    .    Lives in home with . Fully independent.    4 adult children.    8 grandchildren.    Walks 2-3x/week.     Functional capacity: Can walk indoors around the house (2 METs)    OBJECTIVE:                                                      /72   Pulse 86   Temp 98.5  F (36.9  C) (Temporal)   Wt 71.1 kg (156 lb 12.8 oz)   SpO2 97%   BMI 27.78 kg/m    Constitutional: well-appearing  Respiratory: normal respiratory effort; clear to auscultation bilaterally  Cardiovascular: regular rate and rhythm; no  edema  Musculoskeletal: walks with walker  Psych: normal judgment and insight; normal mood and affect    ---    (Chart documentation was completed, in part, with [employer] voice-recognition software. Even though reviewed, some grammatical, spelling, and word errors may remain.)

## 2023-06-15 NOTE — LETTER
"6/15/2023    Sharmaine Burks MD  [address] [facility] [address]    RE: Priya Funez       Dear Colleague,     I had the pleasure of seeing Priya Funez in the [facility].    Cardiology Clinic Progress Note    Service Date: Charlene 15, 2023    Primary Cardiologist: Dr. Kwok      Reason for Visit: H&P angiogram     HPI:   Priya Funez is a delightful 82-year-old woman with past cardiac history significant for the followin.  History of emergent aortic dissection repair 2019 with prolonged hospitalization with tracheostomy after repair.  She had collapsed while at a gas station.    2.  Chronic A-fib with history of AV node ablation and permanent pacer   3.  HFpEF  4.  Severe tricuspid regurgitation now part of the clasp trial  5.  Mild MR.  6.  Hypertension    Ms. Funez comes in today, as part of her work-up for entering the clasp trial.  She has a ALEX and transthoracic echocardiogram tomorrow at [facility] for which anesthesia will be present given her history of tracheotomy.  In addition she requires a right and left heart cath for the study more here to discuss that today.  She overall feels okay but she gets a little more winded than she would like this is worse if she does not use her walker.  She denies orthopnea or PND.  She was able to attend her granddaughter's engagement party this weekend and go to the casino yesterday without much difficulty.  She has not had peripheral edema.  She has several friends and family members who have had stents put in.    Physical Exam:  /73   Pulse 79   Ht 1.613 m (5' 3.5\")   Wt 69.9 kg (154 lb 1.6 oz)   SpO2 97%   BMI 26.87 kg/m     Wt Readings from Last 5 Encounters:   06/15/23 69.9 kg (154 lb 1.6 oz)   23 71.1 kg (156 lb 12.8 oz)   23 70.8 kg (156 lb)   23 70.7 kg (155 lb 12.8 oz)   23 70.3 kg (155 lb)      Well-developed well-nourished woman in no acute distress.  Normocephalic " atraumatic.  Heart is regular with 3 out of 6 systolic murmur heard best at lower left sternal border.  Lungs are clear without wheezes rales or rhonchi extremities without peripheral edema 2+ radial ulnar and femoral pulses bilaterally all without bruit.  Skin is warm and dry.    Assessment and Plan:  This is a delightful 82-year-old woman with past medical history significant for emergent aortic dissection repair in January 2019 with tracheostomy at that time, hypertension, HFpEF, severe TR who was getting worked up for the clasp trial.  As part of this work-up she requires right and left heart catheterization. Risks and benefits of coronary angiogram discussed today including, bleeding, bruising, infection, allergic reaction, kidney damage (including need for dialysis), stroke, heart attack, vascular damage, emergency open heart surgery, up to and including death.  Patient indicates understanding and is agreeable to proceed.  She will stop her Coumadin taking the last dose on 6/19 so that her INR is below 2.0 for this procedure.  We discussed the possibility of stenting and there is no contraindication to DAPT.  Because we will also be doing a right heart cath I would like her to take her diuretics and spironolactone the morning of this will give us a better idea of her true volume status.    She otherwise has many other visits as part of this work-up including a heart failure visit, surgical visit, a ALEX and transthoracic echocardiogram tomorrow that she will keep.  I have asked the structural team to reach out to Dr. Núñez and see if he requires any additional teams available for her angiogram for sedation given her history of tracheostomy.  This is now closed, she breathes on her own and has not required oxygen so I suspect she will do just fine with sedation.    Thank you for allowing me to participate in this patient's care, she will follow-up with the structural/research team going forward.    Marissa Choudhary,  STACIE  [facility]       40 total minutes was spent today including chart review, precharting, history and exam, post visit documentation, and reviewing studies as outlined above.   Orders this visit:  No orders of the defined types were placed in this encounter.    No orders of the defined types were placed in this encounter.    There are no discontinued medications.  No diagnosis found.    Current Medications:  Current Outpatient Medications   Medication Sig Dispense Refill    Acetaminophen 325 MG CAPS Take 325-650 mg by mouth every 4 hours as needed      albuterol (PROAIR HFA/PROVENTIL HFA/VENTOLIN HFA) 108 (90 Base) MCG/ACT inhaler Inhale 2 puffs into the lungs every 6 hours as needed for shortness of breath / dyspnea or wheezing 18 g 0    aspirin (ASA) 81 MG EC tablet Take 81 mg by mouth every 24 hours      cholecalciferol 25 MCG (1000 UT) TABS Take 1,000 Units by mouth daily       cyanocobalamin (VITAMIN B-12) 100 MCG tablet Take 1,000 mcg by mouth daily       escitalopram (LEXAPRO) 10 MG tablet Take 1 tablet (10 mg) by mouth daily 90 tablet 3    metoprolol tartrate (LOPRESSOR) 25 MG tablet Take 1 tablet (25 mg) by mouth 2 times daily 180 tablet 3    mirtazapine (REMERON) 15 MG tablet Take 1 tablet (15 mg) by mouth At Bedtime 90 tablet 3    spironolactone (ALDACTONE) 25 MG tablet Take 1 tablet (25 mg) by mouth daily 90 tablet 3    torsemide (DEMADEX) 10 MG tablet Take 4 tablets(40mg) by mouth every day 360 tablet 3    warfarin ANTICOAGULANT (COUMADIN) 2 MG tablet Take one tablet (2 mg) by mouth on Fridays; take two tablets (4 mg) all other days; or as instructed by ACC team. 170 tablet 1    empagliflozin (JARDIANCE) 10 MG TABS tablet Take 1 tablet (10 mg) by mouth daily (Patient not taking: Reported on 6/15/2023) 90 tablet 3       Allergies:  Allergies   Allergen Reactions    Amoxicillin Swelling     Face and body    Ciprofloxacin Hives       Past Medical, Surgical and Family History:  Past  Medical History:   Diagnosis Date    Benign essential hypertension     Cardiac pacemaker in situ     Medtronic    Chronic atrial fibrillation (H)     s/p AV node ablation and PPM placement January, 2019    Chronic diastolic heart failure (H)     Chronic kidney disease, stage III (moderate) (H)     Chronic right-sided heart failure (H)     NEDA (generalized anxiety disorder)     History of aortic dissection     s/p emergent repair 2019    History of blood transfusion     no adverse reactions    Hypertension     Mild mitral regurgitation     Obesity     Severe tricuspid regurgitation     Tracheostomy in place (H)      Past Surgical History:   Procedure Laterality Date    ANGIOGRAM Right 01/04/2019    Procedure: DIAGONSTIC ANGIOGRAM OF SMA;  Surgeon: Rigo Jennings MD;  Location:  OR    BIOPSY BREAST      BYPASS GRAFT ARTERY CORONARY, REPAIR VALVE AORTIC, COMBINED N/A 01/04/2019    Procedure: AORTIC DISSECTION REPAIR WITH GRAFT- HEMASHIELD PLATINUM WOVEN DOUBLE VELOR 4 SIDE ARM VASCULAR GRAFT, D:35 X 12 X 10 X 10MM/ L:50CM;  Surgeon: Jose Winslow MD;  Location:  OR    EP ABLATION AV NODE N/A 01/11/2019    Procedure: EP Ablation AV Node;  Surgeon: Tsering Davey MD;  Location:  HEART CARDIAC CATH LAB    EP PACEMAKER Left 01/11/2019    Procedure: EP Pacemaker;  Surgeon: Tsering Davey MD;  Location:  HEART CARDIAC CATH LAB    THYROID SURGERY      benign mass removed    TRACHEOSTOMY N/A 01/25/2019    Procedure: TRACHEOSTOMY  (DR MCCLELLAND);  Surgeon: Bryson Mcclelland MD;  Location:  OR    TUBAL LIGATION       Family History   Problem Relation Age of Onset    Ataxia Mother     Heart Disease Father     Diabetes No family hx of     Myocardial Infarction No family hx of     Cerebrovascular Disease No family hx of     Coronary Artery Disease Early Onset No family hx of     Breast Cancer No family hx of     Colon Cancer No family hx of     Ovarian Cancer No family hx of         Review of  Systems:  Skin:        Eyes:       ENT:       Respiratory:  Positive for shortness of breath;cough  Cardiovascular:  palpitations;chest pain;Negative;syncope or near-syncope;cyanosis;dizziness;lightheadedness;edema Positive for;exercise intolerance;fatigue  Gastroenterology:      Genitourinary:       Musculoskeletal:       Neurologic:       Psychiatric:       Heme/Lymph/Imm:       Endocrine:  Negative       Recent Lab Results:  LIPID RESULTS:  Lab Results   Component Value Date    CHOL 134 06/14/2021    HDL 53 06/14/2021    LDL 67 06/14/2021    TRIG 71 06/14/2021     LIVER ENZYME RESULTS:  Lab Results   Component Value Date    AST 27 04/04/2019    ALT 22 06/14/2021     CBC RESULTS:  Lab Results   Component Value Date    WBC 6.5 06/08/2023    WBC 5.3 06/14/2021    RBC 4.75 06/08/2023    RBC 4.33 06/14/2021    HGB 14.1 06/08/2023    HGB 12.8 06/14/2021    HCT 43.6 06/08/2023    HCT 40.1 06/14/2021    MCV 92 06/08/2023    MCV 93 06/14/2021    MCH 29.7 06/08/2023    MCH 29.6 06/14/2021    MCHC 32.3 06/08/2023    MCHC 31.9 06/14/2021    RDW 14.2 06/08/2023    RDW 13.5 06/14/2021     06/08/2023     06/14/2021     BMP RESULTS:  Lab Results   Component Value Date     03/23/2023     06/23/2021    POTASSIUM 3.8 03/23/2023    POTASSIUM 3.8 12/13/2022    POTASSIUM 4.5 06/23/2021    CHLORIDE 99 03/23/2023    CHLORIDE 99 12/13/2022    CHLORIDE 104 06/23/2021    CO2 34 (H) 03/23/2023    CO2 33 (H) 12/13/2022    CO2 31 06/23/2021    ANIONGAP 7 03/23/2023    ANIONGAP 7 12/13/2022    ANIONGAP 3 06/23/2021    GLC 76 03/23/2023    GLC 84 12/13/2022    GLC 86 06/23/2021    BUN 33.0 (H) 03/23/2023    BUN 31 (H) 12/13/2022    BUN 30 06/23/2021    CR 1.34 (H) 03/23/2023    CR 1.29 (H) 06/23/2021    GFRESTIMATED 40 (L) 03/23/2023    GFRESTIMATED 39 (L) 06/23/2021    GFRESTBLACK 46 (L) 06/23/2021    BESS 9.7 03/23/2023    BESS 9.2 06/23/2021      A1C RESULTS:  Lab Results   Component Value Date    A1C 5.6 01/30/2019     A1C 5.3 01/04/2019     INR RESULTS:  Lab Results   Component Value Date    INR 2.05 (H) 06/08/2023    INR 1.7 (H) 05/31/2023    INR 4.9 (H) 05/23/2023    INR 2.43 (H) 03/11/2022    INR 2.30 (H) 07/09/2021    INR 2.40 (H) 06/10/2021       CC  No referring provider defined for this encounter.      Thank you for allowing me to participate in the care of your patient.    Sincerely,   STACIE [facility]

## 2023-06-15 NOTE — PROGRESS NOTES
"  Cardiology Clinic Progress Note    Service Date: Charlene 15, 2023    Primary Cardiologist: Dr. Kwok      Reason for Visit: H&P angiogram     HPI:   Priya Funez is a delightful 82-year-old woman with past cardiac history significant for the followin.  History of emergent aortic dissection repair 2019 with prolonged hospitalization with tracheostomy after repair.  She had collapsed while at a gas station.    2.  Chronic A-fib with history of AV node ablation and permanent pacer   3.  HFpEF  4.  Severe tricuspid regurgitation now part of the clasp trial  5.  Mild MR.  6.  Hypertension    Ms. Funez comes in today, as part of her work-up for entering the clasp trial.  She has a ALEX and transthoracic echocardiogram tomorrow at [facility] for which anesthesia will be present given her history of tracheotomy.  In addition she requires a right and left heart cath for the study more here to discuss that today.  She overall feels okay but she gets a little more winded than she would like this is worse if she does not use her walker.  She denies orthopnea or PND.  She was able to attend her granddaughter's engagement party this weekend and go to the [employer] yesterday without much difficulty.  She has not had peripheral edema.  She has several friends and family members who have had stents put in.    Physical Exam:  /73   Pulse 79   Ht 1.613 m (5' 3.5\")   Wt 69.9 kg (154 lb 1.6 oz)   SpO2 97%   BMI 26.87 kg/m     Wt Readings from Last 5 Encounters:   06/15/23 69.9 kg (154 lb 1.6 oz)   23 71.1 kg (156 lb 12.8 oz)   23 70.8 kg (156 lb)   23 70.7 kg (155 lb 12.8 oz)   23 70.3 kg (155 lb)      Well-developed well-nourished woman in no acute distress.  Normocephalic atraumatic.  Heart is regular with 3 out of 6 systolic murmur heard best at lower left sternal border.  Lungs are clear without wheezes rales or rhonchi extremities without peripheral edema 2+ radial ulnar and " femoral pulses bilaterally all without bruit.  Skin is warm and dry.    Assessment and Plan:  This is a delightful 82-year-old woman with past medical history significant for emergent aortic dissection repair in January 2019 with tracheostomy at that time, hypertension, HFpEF, severe TR who was getting worked up for the clasp trial.  As part of this work-up she requires right and left heart catheterization. Risks and benefits of coronary angiogram discussed today including, bleeding, bruising, infection, allergic reaction, kidney damage (including need for dialysis), stroke, heart attack, vascular damage, emergency open heart surgery, up to and including death.  Patient indicates understanding and is agreeable to proceed.  She will stop her Coumadin taking the last dose on 6/19 so that her INR is below 2.0 for this procedure.  We discussed the possibility of stenting and there is no contraindication to DAPT.  Because we will also be doing a right heart cath I would like her to take her diuretics and spironolactone the morning of this will give us a better idea of her true volume status.    She otherwise has many other visits as part of this work-up including a heart failure visit, surgical visit, a ALEX and transthoracic echocardiogram tomorrow that she will keep.  I have asked the structural team to reach out to Dr. Núñez and see if he requires any additional teams available for her angiogram for sedation given her history of tracheostomy.  This is now closed, she breathes on her own and has not required oxygen so I suspect she will do just fine with sedation.    Thank you for allowing me to participate in this patient's care, she will follow-up with the structural/research team going forward.    Marissa Choudhary PA-C  [facility] Cardiology       40 total minutes was spent today including chart review, precharting, history and exam, post visit documentation, and reviewing studies as outlined above.   Orders this  visit:  No orders of the defined types were placed in this encounter.    No orders of the defined types were placed in this encounter.    There are no discontinued medications.  No diagnosis found.    Current Medications:  Current Outpatient Medications   Medication Sig Dispense Refill     Acetaminophen 325 MG CAPS Take 325-650 mg by mouth every 4 hours as needed       albuterol (PROAIR HFA/PROVENTIL HFA/VENTOLIN HFA) 108 (90 Base) MCG/ACT inhaler Inhale 2 puffs into the lungs every 6 hours as needed for shortness of breath / dyspnea or wheezing 18 g 0     aspirin (ASA) 81 MG EC tablet Take 81 mg by mouth every 24 hours       cholecalciferol 25 MCG (1000 UT) TABS Take 1,000 Units by mouth daily        cyanocobalamin (VITAMIN B-12) 100 MCG tablet Take 1,000 mcg by mouth daily        escitalopram (LEXAPRO) 10 MG tablet Take 1 tablet (10 mg) by mouth daily 90 tablet 3     metoprolol tartrate (LOPRESSOR) 25 MG tablet Take 1 tablet (25 mg) by mouth 2 times daily 180 tablet 3     mirtazapine (REMERON) 15 MG tablet Take 1 tablet (15 mg) by mouth At Bedtime 90 tablet 3     spironolactone (ALDACTONE) 25 MG tablet Take 1 tablet (25 mg) by mouth daily 90 tablet 3     torsemide (DEMADEX) 10 MG tablet Take 4 tablets(40mg) by mouth every day 360 tablet 3     warfarin ANTICOAGULANT (COUMADIN) 2 MG tablet Take one tablet (2 mg) by mouth on Fridays; take two tablets (4 mg) all other days; or as instructed by ACC team. 170 tablet 1     empagliflozin (JARDIANCE) 10 MG TABS tablet Take 1 tablet (10 mg) by mouth daily (Patient not taking: Reported on 6/15/2023) 90 tablet 3       Allergies:  Allergies   Allergen Reactions     Amoxicillin Swelling     Face and body     Ciprofloxacin Hives       Past Medical, Surgical and Family History:  Past Medical History:   Diagnosis Date     Benign essential hypertension      Cardiac pacemaker in situ     Medtronic     Chronic atrial fibrillation (H)     s/p AV node ablation and PPM placement  January, 2019     Chronic diastolic heart failure (H)      Chronic kidney disease, stage III (moderate) (H)      Chronic right-sided heart failure (H)      NEDA (generalized anxiety disorder)      History of aortic dissection     s/p emergent repair 2019     History of blood transfusion     no adverse reactions     Hypertension      Mild mitral regurgitation      Obesity      Severe tricuspid regurgitation      Tracheostomy in place (H)      Past Surgical History:   Procedure Laterality Date     ANGIOGRAM Right 01/04/2019    Procedure: DIAGONSTIC ANGIOGRAM OF SMA;  Surgeon: Rigo Jennings MD;  Location:  OR     BIOPSY BREAST       BYPASS GRAFT ARTERY CORONARY, REPAIR VALVE AORTIC, COMBINED N/A 01/04/2019    Procedure: AORTIC DISSECTION REPAIR WITH GRAFT- HEMASHIELD PLATINUM WOVEN DOUBLE VELOR 4 SIDE ARM VASCULAR GRAFT, D:35 X 12 X 10 X 10MM/ L:50CM;  Surgeon: Jose Winslow MD;  Location:  OR     EP ABLATION AV NODE N/A 01/11/2019    Procedure: EP Ablation AV Node;  Surgeon: Tsering Davey MD;  Location:  HEART CARDIAC CATH LAB     EP PACEMAKER Left 01/11/2019    Procedure: EP Pacemaker;  Surgeon: Tsering Davey MD;  Location:  HEART CARDIAC CATH LAB     THYROID SURGERY      benign mass removed     TRACHEOSTOMY N/A 01/25/2019    Procedure: TRACHEOSTOMY  (DR MCCLELLAND);  Surgeon: Bryson Mcclelland MD;  Location:  OR     TUBAL LIGATION       Family History   Problem Relation Age of Onset     Ataxia Mother      Heart Disease Father      Diabetes No family hx of      Myocardial Infarction No family hx of      Cerebrovascular Disease No family hx of      Coronary Artery Disease Early Onset No family hx of      Breast Cancer No family hx of      Colon Cancer No family hx of      Ovarian Cancer No family hx of         Review of Systems:  Skin:        Eyes:       ENT:       Respiratory:  Positive for shortness of breath;cough  Cardiovascular:  palpitations;chest pain;Negative;syncope or  near-syncope;cyanosis;dizziness;lightheadedness;edema Positive for;exercise intolerance;fatigue  Gastroenterology:      Genitourinary:       Musculoskeletal:       Neurologic:       Psychiatric:       Heme/Lymph/Imm:       Endocrine:  Negative       Recent Lab Results:  LIPID RESULTS:  Lab Results   Component Value Date    CHOL 134 06/14/2021    HDL 53 06/14/2021    LDL 67 06/14/2021    TRIG 71 06/14/2021     LIVER ENZYME RESULTS:  Lab Results   Component Value Date    AST 27 04/04/2019    ALT 22 06/14/2021     CBC RESULTS:  Lab Results   Component Value Date    WBC 6.5 06/08/2023    WBC 5.3 06/14/2021    RBC 4.75 06/08/2023    RBC 4.33 06/14/2021    HGB 14.1 06/08/2023    HGB 12.8 06/14/2021    HCT 43.6 06/08/2023    HCT 40.1 06/14/2021    MCV 92 06/08/2023    MCV 93 06/14/2021    MCH 29.7 06/08/2023    MCH 29.6 06/14/2021    MCHC 32.3 06/08/2023    MCHC 31.9 06/14/2021    RDW 14.2 06/08/2023    RDW 13.5 06/14/2021     06/08/2023     06/14/2021     BMP RESULTS:  Lab Results   Component Value Date     03/23/2023     06/23/2021    POTASSIUM 3.8 03/23/2023    POTASSIUM 3.8 12/13/2022    POTASSIUM 4.5 06/23/2021    CHLORIDE 99 03/23/2023    CHLORIDE 99 12/13/2022    CHLORIDE 104 06/23/2021    CO2 34 (H) 03/23/2023    CO2 33 (H) 12/13/2022    CO2 31 06/23/2021    ANIONGAP 7 03/23/2023    ANIONGAP 7 12/13/2022    ANIONGAP 3 06/23/2021    GLC 76 03/23/2023    GLC 84 12/13/2022    GLC 86 06/23/2021    BUN 33.0 (H) 03/23/2023    BUN 31 (H) 12/13/2022    BUN 30 06/23/2021    CR 1.34 (H) 03/23/2023    CR 1.29 (H) 06/23/2021    GFRESTIMATED 40 (L) 03/23/2023    GFRESTIMATED 39 (L) 06/23/2021    GFRESTBLACK 46 (L) 06/23/2021    BESS 9.7 03/23/2023    BESS 9.2 06/23/2021      A1C RESULTS:  Lab Results   Component Value Date    A1C 5.6 01/30/2019    A1C 5.3 01/04/2019     INR RESULTS:  Lab Results   Component Value Date    INR 2.05 (H) 06/08/2023    INR 1.7 (H) 05/31/2023    INR 4.9 (H) 05/23/2023    INR  2.43 (H) 03/11/2022    INR 2.30 (H) 07/09/2021    INR 2.40 (H) 06/10/2021       CC  No referring provider defined for this encounter.

## 2023-06-15 NOTE — PATIENT INSTRUCTIONS
Thank you for visiting with me today.    We discussed: we'll do the angiogram on 6/22 to look at your heart arteries and measure the pressure in your heart.      Take you last dose of warfarin Sunday night June 18th.      Take all your other medications normally on the day of the angiogram.      Please call my nurse, Nazanin at [phone] with any questions or concerns.    Scheduling phone number: [phone]  Reminder: Please bring in all current medications, over the counter supplements and vitamin bottles to your next appointment.

## 2023-06-15 NOTE — H&P (VIEW-ONLY)
"  Cardiology Clinic Progress Note    Service Date: Charlene 15, 2023    Primary Cardiologist: Dr. Kwok      Reason for Visit: H&P angiogram     HPI:   Priya Funez is a delightful 82-year-old woman with past cardiac history significant for the followin.  History of emergent aortic dissection repair 2019 with prolonged hospitalization with tracheostomy after repair.  She had collapsed while at a gas station.    2.  Chronic A-fib with history of AV node ablation and permanent pacer   3.  HFpEF  4.  Severe tricuspid regurgitation now part of the clasp trial  5.  Mild MR.  6.  Hypertension    Ms. Funez comes in today, as part of her work-up for entering the clasp trial.  She has a ALEX and transthoracic echocardiogram tomorrow at [facility] for which anesthesia will be present given her history of tracheotomy.  In addition she requires a right and left heart cath for the study more here to discuss that today.  She overall feels okay but she gets a little more winded than she would like this is worse if she does not use her walker.  She denies orthopnea or PND.  She was able to attend her granddaughter's engagement party this weekend and go to the [employer] yesterday without much difficulty.  She has not had peripheral edema.  She has several friends and family members who have had stents put in.    Physical Exam:  /73   Pulse 79   Ht 1.613 m (5' 3.5\")   Wt 69.9 kg (154 lb 1.6 oz)   SpO2 97%   BMI 26.87 kg/m     Wt Readings from Last 5 Encounters:   06/15/23 69.9 kg (154 lb 1.6 oz)   23 71.1 kg (156 lb 12.8 oz)   23 70.8 kg (156 lb)   23 70.7 kg (155 lb 12.8 oz)   23 70.3 kg (155 lb)      Well-developed well-nourished woman in no acute distress.  Normocephalic atraumatic.  Heart is regular with 3 out of 6 systolic murmur heard best at lower left sternal border.  Lungs are clear without wheezes rales or rhonchi extremities without peripheral edema 2+ radial ulnar and " femoral pulses bilaterally all without bruit.  Skin is warm and dry.    Assessment and Plan:  This is a delightful 82-year-old woman with past medical history significant for emergent aortic dissection repair in January 2019 with tracheostomy at that time, hypertension, HFpEF, severe TR who was getting worked up for the clasp trial.  As part of this work-up she requires right and left heart catheterization. Risks and benefits of coronary angiogram discussed today including, bleeding, bruising, infection, allergic reaction, kidney damage (including need for dialysis), stroke, heart attack, vascular damage, emergency open heart surgery, up to and including death.  Patient indicates understanding and is agreeable to proceed.  She will stop her Coumadin taking the last dose on 6/19 so that her INR is below 2.0 for this procedure.  We discussed the possibility of stenting and there is no contraindication to DAPT.  Because we will also be doing a right heart cath I would like her to take her diuretics and spironolactone the morning of this will give us a better idea of her true volume status.    She otherwise has many other visits as part of this work-up including a heart failure visit, surgical visit, a ALEX and transthoracic echocardiogram tomorrow that she will keep.  I have asked the structural team to reach out to Dr. Núñez and see if he requires any additional teams available for her angiogram for sedation given her history of tracheostomy.  This is now closed, she breathes on her own and has not required oxygen so I suspect she will do just fine with sedation.    Thank you for allowing me to participate in this patient's care, she will follow-up with the structural/research team going forward.    Marissa Choudhary PA-C  [facility] Cardiology       40 total minutes was spent today including chart review, precharting, history and exam, post visit documentation, and reviewing studies as outlined above.   Orders this  visit:  No orders of the defined types were placed in this encounter.    No orders of the defined types were placed in this encounter.    There are no discontinued medications.  No diagnosis found.    Current Medications:  Current Outpatient Medications   Medication Sig Dispense Refill     Acetaminophen 325 MG CAPS Take 325-650 mg by mouth every 4 hours as needed       albuterol (PROAIR HFA/PROVENTIL HFA/VENTOLIN HFA) 108 (90 Base) MCG/ACT inhaler Inhale 2 puffs into the lungs every 6 hours as needed for shortness of breath / dyspnea or wheezing 18 g 0     aspirin (ASA) 81 MG EC tablet Take 81 mg by mouth every 24 hours       cholecalciferol 25 MCG (1000 UT) TABS Take 1,000 Units by mouth daily        cyanocobalamin (VITAMIN B-12) 100 MCG tablet Take 1,000 mcg by mouth daily        escitalopram (LEXAPRO) 10 MG tablet Take 1 tablet (10 mg) by mouth daily 90 tablet 3     metoprolol tartrate (LOPRESSOR) 25 MG tablet Take 1 tablet (25 mg) by mouth 2 times daily 180 tablet 3     mirtazapine (REMERON) 15 MG tablet Take 1 tablet (15 mg) by mouth At Bedtime 90 tablet 3     spironolactone (ALDACTONE) 25 MG tablet Take 1 tablet (25 mg) by mouth daily 90 tablet 3     torsemide (DEMADEX) 10 MG tablet Take 4 tablets(40mg) by mouth every day 360 tablet 3     warfarin ANTICOAGULANT (COUMADIN) 2 MG tablet Take one tablet (2 mg) by mouth on Fridays; take two tablets (4 mg) all other days; or as instructed by ACC team. 170 tablet 1     empagliflozin (JARDIANCE) 10 MG TABS tablet Take 1 tablet (10 mg) by mouth daily (Patient not taking: Reported on 6/15/2023) 90 tablet 3       Allergies:  Allergies   Allergen Reactions     Amoxicillin Swelling     Face and body     Ciprofloxacin Hives       Past Medical, Surgical and Family History:  Past Medical History:   Diagnosis Date     Benign essential hypertension      Cardiac pacemaker in situ     Medtronic     Chronic atrial fibrillation (H)     s/p AV node ablation and PPM placement  January, 2019     Chronic diastolic heart failure (H)      Chronic kidney disease, stage III (moderate) (H)      Chronic right-sided heart failure (H)      NEDA (generalized anxiety disorder)      History of aortic dissection     s/p emergent repair 2019     History of blood transfusion     no adverse reactions     Hypertension      Mild mitral regurgitation      Obesity      Severe tricuspid regurgitation      Tracheostomy in place (H)      Past Surgical History:   Procedure Laterality Date     ANGIOGRAM Right 01/04/2019    Procedure: DIAGONSTIC ANGIOGRAM OF SMA;  Surgeon: Rigo Jennings MD;  Location:  OR     BIOPSY BREAST       BYPASS GRAFT ARTERY CORONARY, REPAIR VALVE AORTIC, COMBINED N/A 01/04/2019    Procedure: AORTIC DISSECTION REPAIR WITH GRAFT- HEMASHIELD PLATINUM WOVEN DOUBLE VELOR 4 SIDE ARM VASCULAR GRAFT, D:35 X 12 X 10 X 10MM/ L:50CM;  Surgeon: Jose Winslow MD;  Location:  OR     EP ABLATION AV NODE N/A 01/11/2019    Procedure: EP Ablation AV Node;  Surgeon: Tsering Davey MD;  Location:  HEART CARDIAC CATH LAB     EP PACEMAKER Left 01/11/2019    Procedure: EP Pacemaker;  Surgeon: Tsering Davey MD;  Location:  HEART CARDIAC CATH LAB     THYROID SURGERY      benign mass removed     TRACHEOSTOMY N/A 01/25/2019    Procedure: TRACHEOSTOMY  (DR MCCLELLAND);  Surgeon: Bryson Mcclelland MD;  Location:  OR     TUBAL LIGATION       Family History   Problem Relation Age of Onset     Ataxia Mother      Heart Disease Father      Diabetes No family hx of      Myocardial Infarction No family hx of      Cerebrovascular Disease No family hx of      Coronary Artery Disease Early Onset No family hx of      Breast Cancer No family hx of      Colon Cancer No family hx of      Ovarian Cancer No family hx of         Review of Systems:  Skin:        Eyes:       ENT:       Respiratory:  Positive for shortness of breath;cough  Cardiovascular:  palpitations;chest pain;Negative;syncope or  near-syncope;cyanosis;dizziness;lightheadedness;edema Positive for;exercise intolerance;fatigue  Gastroenterology:      Genitourinary:       Musculoskeletal:       Neurologic:       Psychiatric:       Heme/Lymph/Imm:       Endocrine:  Negative       Recent Lab Results:  LIPID RESULTS:  Lab Results   Component Value Date    CHOL 134 06/14/2021    HDL 53 06/14/2021    LDL 67 06/14/2021    TRIG 71 06/14/2021     LIVER ENZYME RESULTS:  Lab Results   Component Value Date    AST 27 04/04/2019    ALT 22 06/14/2021     CBC RESULTS:  Lab Results   Component Value Date    WBC 6.5 06/08/2023    WBC 5.3 06/14/2021    RBC 4.75 06/08/2023    RBC 4.33 06/14/2021    HGB 14.1 06/08/2023    HGB 12.8 06/14/2021    HCT 43.6 06/08/2023    HCT 40.1 06/14/2021    MCV 92 06/08/2023    MCV 93 06/14/2021    MCH 29.7 06/08/2023    MCH 29.6 06/14/2021    MCHC 32.3 06/08/2023    MCHC 31.9 06/14/2021    RDW 14.2 06/08/2023    RDW 13.5 06/14/2021     06/08/2023     06/14/2021     BMP RESULTS:  Lab Results   Component Value Date     03/23/2023     06/23/2021    POTASSIUM 3.8 03/23/2023    POTASSIUM 3.8 12/13/2022    POTASSIUM 4.5 06/23/2021    CHLORIDE 99 03/23/2023    CHLORIDE 99 12/13/2022    CHLORIDE 104 06/23/2021    CO2 34 (H) 03/23/2023    CO2 33 (H) 12/13/2022    CO2 31 06/23/2021    ANIONGAP 7 03/23/2023    ANIONGAP 7 12/13/2022    ANIONGAP 3 06/23/2021    GLC 76 03/23/2023    GLC 84 12/13/2022    GLC 86 06/23/2021    BUN 33.0 (H) 03/23/2023    BUN 31 (H) 12/13/2022    BUN 30 06/23/2021    CR 1.34 (H) 03/23/2023    CR 1.29 (H) 06/23/2021    GFRESTIMATED 40 (L) 03/23/2023    GFRESTIMATED 39 (L) 06/23/2021    GFRESTBLACK 46 (L) 06/23/2021    BESS 9.7 03/23/2023    BESS 9.2 06/23/2021      A1C RESULTS:  Lab Results   Component Value Date    A1C 5.6 01/30/2019    A1C 5.3 01/04/2019     INR RESULTS:  Lab Results   Component Value Date    INR 2.05 (H) 06/08/2023    INR 1.7 (H) 05/31/2023    INR 4.9 (H) 05/23/2023    INR  2.43 (H) 03/11/2022    INR 2.30 (H) 07/09/2021    INR 2.40 (H) 06/10/2021       CC  No referring provider defined for this encounter.

## 2023-06-16 NOTE — DISCHARGE INSTRUCTIONS
1. You are required to have someone accompany you home.    2. Rest today. Do not drive or operate machinery today. Over-activity may produce nausea and dizziness.    3. You should follow your normal diet. Drink plenty of fluids. Do not drink any alcoholic beverages for 24 hours. *(Alcohol may interact with the medications you received today).    4. NO HOT FOODS or LIQUIDS FOR 6 HOURS after the procedure.    5. You may have a sore throat or cough. This is normal. These symptoms should resolve in 24 hours.     6. If you have further questions call your doctor.  Dr. Dennis Meier preformed your procedure today.

## 2023-06-16 NOTE — ANESTHESIA CARE TRANSFER NOTE
Patient: Priya Funez    Procedure: Procedure(s):  ECHOCARDIOGRAM,TRANSESOPHAGEAL,WITH ANESTHESIA       Diagnosis: Difficult airway [T88.4XXA]  Nonrheumatic tricuspid (valve) insufficiency [I36.1]  Diagnosis Additional Information: No value filed.    Anesthesia Type:   MAC     Note:    Oropharynx: oropharynx clear of all foreign objects  Level of Consciousness: awake  Oxygen Supplementation: room air    Independent Airway: airway patency satisfactory and stable  Dentition: dentition unchanged  Vital Signs Stable: post-procedure vital signs reviewed and stable  Report to RN Given: handoff report given  Patient transferred to: Phase II    Handoff Report: Identifed the Patient, Identified the Reponsible Provider, Reviewed the pertinent medical history, Discussed the surgical course, Reviewed Intra-OP anesthesia mangement and issues during anesthesia, Set expectations for post-procedure period and Allowed opportunity for questions and acknowledgement of understanding      Vitals:  Vitals Value Taken Time   BP 95/52 06/16/23 1316   Temp 97.7  F (36.5  C) 06/16/23 1315   Pulse 62 06/16/23 1318   Resp 12 06/16/23 1315   SpO2 94 % 06/16/23 1318   Vitals shown include unvalidated device data.    Electronically Signed By: TAMRA Leach CRNA  June 16, 2023  1:20 PM

## 2023-06-16 NOTE — INTERVAL H&P NOTE
"I have reviewed the surgical (or preoperative) H&P that is linked to this encounter, and examined the patient. There are no significant changes    Clinical Conditions Present on Arrival:  Clinically Significant Risk Factors Present on Admission                # Drug Induced Coagulation Defect: home medication list includes an anticoagulant medication  # Drug Induced Platelet Defect: home medication list includes an antiplatelet medication  # Overweight: Estimated body mass index is 26.5 kg/m  as calculated from the following:    Height as of an earlier encounter on 6/16/23: 1.613 m (5' 3.5\").    Weight as of an earlier encounter on 6/16/23: 68.9 kg (152 lb).       "

## 2023-06-16 NOTE — ANESTHESIA POSTPROCEDURE EVALUATION
Patient: Priya Funez    Procedure: Procedure(s):  ECHOCARDIOGRAM,TRANSESOPHAGEAL,WITH ANESTHESIA       Anesthesia Type:  MAC    Note:  Disposition: Outpatient   Postop Pain Control: Uneventful            Sign Out: Well controlled pain   PONV: No   Neuro/Psych: Uneventful            Sign Out: Acceptable/Baseline neuro status   Airway/Respiratory: Uneventful            Sign Out: Acceptable/Baseline resp. status   CV/Hemodynamics: Uneventful            Sign Out: Acceptable CV status; No obvious hypovolemia; No obvious fluid overload   Other NRE: NONE   DID A NON-ROUTINE EVENT OCCUR? No           Last vitals:  Vitals Value Taken Time   /58 06/16/23 1530   Temp 36.5  C (97.7  F) 06/16/23 1315   Pulse 62 06/16/23 1535   Resp 12 06/16/23 1530   SpO2 93 % 06/16/23 1535   Vitals shown include unvalidated device data.    Electronically Signed By: Elizabeth Faustin MD  June 16, 2023  4:35 PM

## 2023-06-16 NOTE — PRE-PROCEDURE
GENERAL PRE-PROCEDURE:   Procedure:  ALEX  Date/Time:  6/16/2023 12:12 PM    Risks and benefits: Risks, benefits and alternatives were discussed    Consent given by:  Parent  Patient states understanding of procedure being performed: Yes    Patient's understanding of procedure matches consent: Yes    Procedure consent matches procedure scheduled: Yes    Expected level of sedation:  Moderate  Appropriately NPO:  Yes  ASA Class:  3  Mallampati  :  Grade 3- soft palate visible, posterior pharyngeal wall not visible  Lungs:  Lungs clear with good breath sounds bilaterally  Heart:  Systolic murmur  History & Physical reviewed:  History and physical reviewed and no updates needed  Statement of review:  I have reviewed the lab findings, diagnostic data, medications, and the plan for sedation

## 2023-06-16 NOTE — PROGRESS NOTES
ANTICOAGULATION  MANAGEMENT: Discharge Review    Priya Funez chart reviewed for anticoagulation continuity of care    Outpatient surgery/procedure on 6/16 for ALEX.    Discharge disposition: Home    Results:  No results for input(s): INR, [id number], F2, ALMWH, AAUFH in the last 168 hours.    Anticoagulation inpatient management:     not applicable     Anticoagulation discharge instructions:     Warfarin dosing: home regimen continued   Bridging: No   INR goal change: No      Medication changes affecting anticoagulation: No    Additional factors affecting anticoagulation: No     PLAN     No adjustment to anticoagulation plan needed    Recommended follow up is scheduled  Patient not contacted    No adjustment to Anticoagulation Calendar was required       Roxana Luciano RN

## 2023-06-16 NOTE — TELEPHONE ENCOUNTER
Patient is at [facility] for TTE and ALEX for CLASP II trial. We are planning for angiogram on 6/22/23 at [facility] with Dr. Núñez. Informed patient that I will call her next week with full angiogram instructions but informed her in voicemail to hold both Jardiance and warfarin prior to angiogram.

## 2023-06-16 NOTE — ANESTHESIA PREPROCEDURE EVALUATION
Anesthesia Pre-Procedure Evaluation    Patient: Priya Funez   MRN: [id number] : 1940        Procedure : Procedure(s):  ECHOCARDIOGRAM,TRANSESOPHAGEAL,WITH ANESTHESIA          Past Medical History:   Diagnosis Date     Antiplatelet or antithrombotic long-term use      Benign essential hypertension      Cardiac pacemaker in situ     Medtronic     Chronic atrial fibrillation (H)     s/p AV node ablation and PPM placement      Chronic diastolic heart failure (H)      Chronic kidney disease, stage III (moderate) (H)      Chronic right-sided heart failure (H)      Congestive heart failure (H)      NEDA (generalized anxiety disorder)      History of aortic dissection     s/p emergent repair      History of blood transfusion     no adverse reactions     Hypertension      Mild mitral regurgitation      Obesity      Severe tricuspid regurgitation       Past Surgical History:   Procedure Laterality Date     ANGIOGRAM Right 2019    Procedure: DIAGONSTIC ANGIOGRAM OF SMA;  Surgeon: Rigo Jennings MD;  Location:  OR     BIOPSY BREAST       BYPASS GRAFT ARTERY CORONARY, REPAIR VALVE AORTIC, COMBINED N/A 2019    Procedure: AORTIC DISSECTION REPAIR WITH GRAFT- HEMASHIELD PLATINUM WOVEN DOUBLE VELOR 4 SIDE ARM VASCULAR GRAFT, D:35 X 12 X 10 X 10MM/ L:50CM;  Surgeon: Jose Winslow MD;  Location:  OR     EP ABLATION AV NODE N/A 2019    Procedure: EP Ablation AV Node;  Surgeon: Tsering Davey MD;  Location:  HEART CARDIAC CATH LAB     EP PACEMAKER Left 2019    Procedure: EP Pacemaker;  Surgeon: Tsering Davey MD;  Location:  HEART CARDIAC CATH LAB     THYROID SURGERY      benign mass removed     TRACHEOSTOMY N/A 2019    Procedure: TRACHEOSTOMY  (DR MCCLELLAND);  Surgeon: Bryson Mcclelland MD;  Location:  OR     TUBAL LIGATION        Allergies   Allergen Reactions     Amoxicillin Swelling     Face and body     Ciprofloxacin Hives      Social History      Tobacco Use     Smoking status: Never     Smokeless tobacco: Never   Vaping Use     Vaping status: Never Used   Substance Use Topics     Alcohol use: Not Currently      Wt Readings from Last 1 Encounters:   06/15/23 69.9 kg (154 lb 1.6 oz)        Anesthesia Evaluation   Pt has had prior anesthetic.     No history of anesthetic complications       ROS/MED HX  ENT/Pulmonary:    (-) tobacco use, asthma, COPD and sleep apnea   Neurologic:    (-) no seizures, no CVA and no TIA   Cardiovascular: Comment: History of emergent aortic dissection repair 1/4/2019 with prolonged hospitalization with tracheostomy after repair.    Cardiac MR 5/2/23:  CONCLUSIONS:      1) Moderate right ventricular dilatation with normal systolic function.     2) There is severe tricuspid regurgitation (regurgitant volume of 40 ml, corresponding to a regurgitant  fraction of 55%). The jet appears to originate in close proximity to the pacemaker lead attachment to the  septal leaflet.      3)  The patient is status post ascending aortic dissection repair. A dissection flap is noted within the  descending thoracic aorta.     (+) hypertension-----Taking blood thinners Instructions Given to patient: on Coumadin, last dose 6/. CHF Last EF: 72% pacemaker, dysrhythmias (s/p ablation, permanent pacemaker since 2019), a-fib, valvular problems/murmurs TR.     METS/Exercise Tolerance:     Hematologic:       Musculoskeletal:       GI/Hepatic:       Renal/Genitourinary:     (+) renal disease, type: CRI,     Endo:    (-) Type II DM and obesity   Psychiatric/Substance Use:       Infectious Disease:       Malignancy:       Other:            Physical Exam    Airway        Mallampati: II   TM distance: > 3 FB   Neck ROM: full   Mouth opening: > 3 cm    Respiratory Devices and Support         Dental     Comment: Fair dentition, no loose or removable teeth        Cardiovascular          Rhythm and rate: regular and normal     Pulmonary   pulmonary exam normal                 OUTSIDE LABS:  CBC:   Lab Results   Component Value Date    WBC 6.5 06/08/2023    WBC 5.3 06/14/2021    HGB 14.1 06/08/2023    HGB 12.6 03/11/2022    HCT 43.6 06/08/2023    HCT 40.1 06/14/2021     06/08/2023     06/14/2021     BMP:   Lab Results   Component Value Date     03/23/2023     01/23/2023    POTASSIUM 3.8 03/23/2023    POTASSIUM 4.1 01/23/2023    CHLORIDE 99 03/23/2023    CHLORIDE 97 (L) 01/23/2023    CO2 34 (H) 03/23/2023    CO2 28 01/23/2023    BUN 33.0 (H) 03/23/2023    BUN 21.4 01/23/2023    CR 1.34 (H) 03/23/2023    CR 1.22 (H) 01/23/2023    GLC 76 03/23/2023    GLC 91 01/23/2023     COAGS:   Lab Results   Component Value Date    PTT 34 06/08/2023    INR 2.05 (H) 06/08/2023    FIBR 148 (L) 01/04/2019     POC:   Lab Results   Component Value Date     (H) 01/29/2019     HEPATIC:   Lab Results   Component Value Date    ALBUMIN 3.2 (L) 04/04/2019    PROTTOTAL 7.6 04/04/2019    ALT 22 06/14/2021    AST 27 04/04/2019    GGT 20 06/08/2023    ALKPHOS 104 04/04/2019    BILITOTAL 1.0 04/04/2019     OTHER:   Lab Results   Component Value Date    PH 7.55 (H) 02/19/2019    LACT 1.2 01/13/2019    A1C 5.6 01/30/2019    BESS 9.7 03/23/2023    PHOS 4.1 02/02/2019    MAG 2.3 02/07/2019    LIPASE 145 01/04/2019       Anesthesia Plan    ASA Status:  3   NPO Status:  NPO Appropriate    Anesthesia Type: MAC.              Consents    Anesthesia Plan(s) and associated risks, benefits, and realistic alternatives discussed. Questions answered and patient/representative(s) expressed understanding.    - Discussed:     - Discussed with:  Patient      - Patient is DNR/DNI Status: No         Postoperative Care            Comments:    Other Comments: Propofol gtt only, low dose - maintain spontaneous respirations - avoid hypoxia, hypercarbia, acidosis             Elizabeth Faustin MD

## 2023-06-19 NOTE — TELEPHONE ENCOUNTER
Patient Returning Call    Reason for call:  Speak to INR nuirene    Information relayed to patient:  N/A    Patient has additional questions:  No      Okay to leave a detailed message?: Yes at Cell number on file:    Telephone Information:   Mobile [phone]

## 2023-06-19 NOTE — TELEPHONE ENCOUNTER
Coronary angiogram/PCI/Right Heart Cath prep instructions.     Patient is scheduled for a Coronary Angiogram at [facility] - [address], [place] [address] - Main Entrance of the Hospital on 6/22/23.  Check in time is at 0730 and procedure to follow.    Patient instructed to remain NPO for solid foods 8 hours prior to arrival and may have clear liquids up to 2 hours prior to arrival.    Patient is not diabetic.    Patient has been instructed to take last dose of warfarin this evening, then hold until after procedure.    Patient is taking torsemide and has been instructed to hold it the morning of procedure.    Patient is taking ASA 81mg daily and will take 4 tabs (324mg) the morning of the procedure.    Pt is on Jardiance and should hold it for 3 days prior to procedure.    Patient advised to take their other daily medications the morning of the procedure with small sips of water.     Verified patient does not have a contrast allergy.    Verified patient has someone available to drive them home from the hospital and can stay with them for 24 hours after the procedure.     Patient advised to notify care team with any new COVID like symptoms prior to procedure.    Patient is aware of visitor policy.    Patient expresses understanding of above instructions and denies further questions at this time.    Jessica Leroy, AYSEN, RN, PHN, CHFN, HNB-BC   6/19/2023 at 9:52 AM

## 2023-06-19 NOTE — PROGRESS NOTES
ANTICOAGULATION MANAGEMENT     Priya Funez 82 year old female is on warfarin with therapeutic INR result. (Goal INR 2.0-3.0)    Recent labs: (last 7 days)     06/19/23  1451   INR 2.1*       ASSESSMENT       Source(s): Chart Review    Previous INR was Subtherapeutic    Medication, diet, health changes since last INR chart reviewed; none identified             PLAN     Unable to reach Priya today.    Left message to continue current dose of warfarin 2 mg tonight. Request call back for assessment.    Follow up required to confirm warfarin dose taken and assess for changes    Christine Adames RN  Anticoagulation Clinic  6/19/2023

## 2023-06-20 NOTE — TELEPHONE ENCOUNTER
Discussed patient with both Dr. Kwok and Dr. Shea. Will cancel angiogram at this time. [facility]commended [facility] with Dr. Car to discuss lead extraction as the RV pacemaker lead is restricting coaptation of the septal leaf wth anterior leaflet. 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 Patient is aware of plan. She will resume her warfarin and jardiance. Placed referral to meet with Dr. Car at [facility] to discuss possible lead extraction. Updated CLASP II team that we will hold off on further trial workup at that time.

## 2023-06-20 NOTE — PROGRESS NOTES
"ANTICOAGULATION MANAGEMENT     Priya Funez 82 year old female is on warfarin with therapeutic INR result. (Goal INR 2.0-3.0)    Recent labs: (last 7 days)     06/19/23  1451   INR 2.1*       ASSESSMENT       Source(s): Chart Review and Patient/Caregiver Call       Warfarin doses taken: Warfarin taken as instructed    Diet: No new diet changes identified    Medication/supplement changes: None noted    New illness, injury, or hospitalization: No    Signs or symptoms of bleeding or clotting: No    Previous result: Therapeutic last visit; previously outside of goal range    Additional findings: Priya was scheduled for coronary angiogram on 6/22 and was planning to hold 6/20 and 6/21 but when talking with her on the morning of 6/20/23 she stated \"as of about 10 minutes ago I was just notified that the surgery has been cancelled. They now thing the lead wire over my tricuspid is causing a problem\" There may be a different procedure scheduled and Priya will notify [facility] RN if/when that is scheduled.         PLAN     Recommended plan for no diet, medication or health factor changes affecting INR     Dosing Instructions: Continue your current warfarin dose with next INR in 3 weeks       Summary  As of 6/19/2023    Full warfarin instructions:  4 mg every Sun, Thu; 2 mg all other days   Next INR check:  7/11/2023             Telephone call with Priya who verbalizes understanding and agrees to plan    Lab visit scheduled    Education provided:     Please call back if any changes to your diet, medications or how you've been taking warfarin    Importance of notifying anticoagulation clinic for: upcoming surgeries and procedures 2 weeks in advance    Contact [phone]  with any changes, questions or concerns.     Plan made per [facility] anticoagulation protocol    Amara Villarreal, RN  Anticoagulation Clinic  6/20/2023    _______________________________________________________________________     Anticoagulation Episode " Summary     Current INR goal:  2.0-3.0   TTR:  64.0 % (1 y)   Target end date:  Indefinite   Send INR reminders to:  [facility]    Indications    Chronic atrial fibrillation (H) [I48.20]  Current use of long term anticoagulation (Resolved) [Z79.01]           Comments:           Anticoagulation Care Providers     Provider Role Specialty Phone number    Sharmaine Burks MD Referring Internal Medicine [phone]

## 2023-06-21 NOTE — TELEPHONE ENCOUNTER
Spoke with patient this morning about cancelling HF specialist and CV surgery consults and will reschedule in the future. She is aware of research plan moving forward.     No further questions at this time.     Sierra R. Behr-Holewinski

## 2023-06-21 NOTE — PROGRESS NOTES
CLASP II TR Lab And EKG  Baseline Screening Review    Purpose: Sagastume OZZIE TrAnScatheter Valve RePair System Pivotal Clinical Trial (CLASP II TR): A prospective, multicenter, randomized, controlled pivotal trial to evaluate the safety and effectiveness of transcatheter tricuspid valve repair with the Sagastume OZZIE Transcatheter Valve Repair System and optimal medical therapy (OMT) compared to OMT alone in patients with tricuspid regurgitation.     Dr. Torres -- Please specify if the lab work and EKG is clinically significant or not. If clinical significant, please indicate what next actions should be!     If CS, please indicate next actions!      [id number]    []  CS    [x]  NCS     Component Ref Range & Units 23 12:04 PM      Systolic Blood Pressure mmHg      Diastolic Blood Pressure mmHg      Ventricular Rate BPM 80      Atrial Rate       MS Interval ms      QRS Duration ms 148      QT ms 434      QTc ms 500      P Axis degrees      R AXIS degrees 121      T Axis degrees -40      Interpretation ECG  Atrial flutter with ventricular pacing   Abnormal ECG   When compared with ECG of 2019 17:21,   No significant change was found   Confirmed by PARI CASTILLO, TRESSA LOC:CHEKO ([id number]) on 2023 4:11:32 PM          Labs:     Component      Latest Ref Rng 2023  12:37 PM 2023  10:51 AM CS/NCS   Urea Nitrogen      8.0 - 23.0 mg/dL  20.5     Creatinine      0.51 - 0.95 mg/dL  1.18 (H)  []  CS    [x]  NCS   Alkaline Phosphatase      35 - 104 U/L  97     AST      0 - 45 U/L  22     ALT      0 - 50 U/L  12     Albumin      3.5 - 5.2 g/dL  3.9     Bilirubin Total      <=1.2 mg/dL  1.5 (H)  []  CS    [x]  NCS   GFR Estimate      >60 mL/min/1.73m2  46 (L)  []  CS    [x]  NCS   WBC      4.0 - 11.0 10e3/uL 6.5      RBC Count      3.80 - 5.20 10e6/uL 4.75      Hemoglobin      11.7 - 15.7 g/dL 14.1      Hematocrit      35.0 - 47.0 % 43.6      Platelet Count      150 - 450 10e3/uL 245      Troponin T, High  Sensitivity      <=14 ng/L 20 (H)   []  CS    [x]  NCS   CK Total      26 - 192 U/L 92      Creatine Kinase MB Test      0.0 - 4.3 ng/mL 3.1      Uric Acid      2.4 - 5.7 mg/dL 7.0 (H)   []  CS    [x]  NCS   GGT      5 - 36 U/L 20      PTT      22 - 38 Seconds 34      INR      0.85 - 1.15  2.05 (H)   []  CS    [x]  NCS      Legend:  (H) High  (L) Low        Shena R. Behr-Holewinsk

## 2023-06-22 NOTE — TELEPHONE ENCOUNTER
[facility] Call Center    Phone Message    May a detailed message be left on voicemail: yes     Reason for Call: Other:    Patient daughter Susie would like a call back regarding patient. Susie would like to touch base before appt. Please reach out to her.    Action Taken: Other: Cardiology     Travel Screening: Not Applicable     Thank you!  [facility]

## 2023-06-22 NOTE — TELEPHONE ENCOUNTER
Pt daughter states that she wants to be seen sooner than 7/26 with Joceline.    Routing to Rn   Pls advise.

## 2023-07-05 NOTE — NURSING NOTE
Chief Complaint   Patient presents with     New Patient       Lead extraction consult.       Vitals were taken and medications reconciled.    [place], EMT  3:00 PM

## 2023-07-05 NOTE — PATIENT INSTRUCTIONS
It was a pleasure to see you in clinic today.  Please do not hesitate to call with any questions or concerns.  We look forward to seeing you in clinic at your next device check.    Cristel Jaramillo, RN, BSN  Electrophysiology Nurse Clinician  [facility]    During Business Hours Please Call:  [phone]  After Hours Please Call:  [phone] - select option #[phone] and ask for job code [id number]

## 2023-07-05 NOTE — LETTER
7/5/2023      RE: Priya Funez  [address]  [facility] [address]       Dear Colleague,    Thank you for the opportunity to participate in the care of your patient, Priya Funez, at the [facility] [place] [facility]. Please see a copy of my visit note below.        ELECTROPHYSIOLOGY CLINIC VISIT    Assessment/Recommendations   Assessment/Plan:    Ms. Funez is an 82 year old female who has a medical history significant for HFpEF, severe TR, permanent AF s/p AVN ablation with PPM implant 1/11/19, emergent aortic dissection repair 1/4/19, HTN, and obesity.     Permanent Atrial Fibrillation s/p AVN ablation with PPM  Torrential TR:  1. Stroke Prophylaxis:  CHADSVASC=++age, +gender, +HTN  4, corresponding to a 4.0% annual stroke / systemic emolism event rate. indicating need for long term oral anticoagulation.  She is appropriately on Warfarin. Follows with Coumadin Clinic.   2. Rate Control: intrinsically well rate controlled due to AVN ablation.   3. Rhythm Control: Permanent AF s/p AVN ablation.   4. Risk Factor Management: Statin, BP control, and MANNY evaluation as indicated.      5. PPM: normal device function, stable lead parameters, and  100%. Having torrential TR with RV pacemaker lead restricting coaptation of septal and anterior leaflets. Discussed that removal of PPM lead may help alleviate some of the TR. Discussed that this isn't gaureentee. We had a long discussion with the patient about lead extraction.  We discussed the indications for lead extraction, the extraction procedure and the risks of extraction.  These risks include vascular tear, cardiac tear, clotting and occlusion of a vessel, infection, damage to other components of the implanted device, bleeding, death, and need for emergency cardiopulmonary bypass, sternotomy or thoracotomy.  The patient understands and wishes to proceed. Given pacing dependence due  to prior AVN ablation, we recommended leadless PPM so no pacing lead would interfere with the TV. We also discussed leadless PPM implant in detail risks, benefits and she states understanding and is agreeable to proceed with PPM extraction and leadless PPM implant.       Follow up 3 months post procedure.        History of Present Illness/Subjective    Ms. Priya Funez is a 82 year old female who comes in today for EP consultation of device management .    Ms. Funez is an 82 year old female who has a medical history significant for HFpEF, severe TR, permanent AF s/p AVN ablation with PPM implant 1/11/19, emergent aortic dissection repair 1/4/19, HTN, and obesity.     She underwent an emergent aortic dissection repair in 2019 with a prolonged hospitalization with difficult vent weaning requiring tracheostomy. She also has AF which became permanent and she had AVN ablation and PPM implanted in 2019. She has HFpEF. She has developed severe TR. She had a ALEX to identify mechanism of TR which showed massive TR with RV pacemaker lead restricting coaptation of septal and anterior leaflets. Thus, she was referred for consideration for extraction of pacemaker lead.     She reports feeling ALEXANDER/SOB. She denies chest discomfort, palpitations, abdominal fullness/bloating or peripheral edema, paroxysmal nocturnal dyspnea, orthopnea, lightheadedness, dizziness, pre-syncope, or syncope. Device interrogation shows normal device function, stable lead parameters, no arrhyhmias recorded, and  100%. Current cardiac medications include: Spironolactone, Metoprolol, Jardiance, Torsemide, ASA, and Warfarin.       Device info:  [employer] model W1 SRO1, serial number [id number].      Ventricular lead was Medtronic model 5076-52, serial number [id number]  [id number],     I have reviewed and updated the patient's Past Medical History, Social History, Family History and Medication List.     Cardiographics (Personally Reviewed) :   6/16/23 Echo:    Interpretation Summary     1. The left ventricle is normal in size. Left ventricular function is  normal.The ejection fraction is 55-60%.  2. The right ventricle is mildly dilated.Mildly decreased right ventricular  systolic function  3. 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  4. Massive tricuspid regurgitation related to septal leaflet restriction from  RV pacemaker lead causing lack of coaptation. Coaptation gap is 1.2 cm. Septal  leaflet is mobile, non-calcified and non-sclerotic.     Compared to the prior study dated 6/2/2023, RV size was measure at midly  dilated on current study. Otherwise no signficant changes.    5/2/23 CMR:  1. The LV is normal in cavity size and wall thickness. The global systolic function is normal. The LVEF is  72%. Paradoxical septal motion is noted.      2. The RV is moderately dilated. The global systolic function is normal. The RVEF is 71%.  A pacemaker lead  is noted.     3. There is severe right atrial enlargement.      4. There is severe tricuspid regurgitation (regurgitant volume of 40 ml, corresponding to a regurgitant  fraction of 55%). The jet appears to originate in close proximity to the pacemaker lead attachment to the  septal leaflet.      5. There is a small area of midwall late gadolinium enhancement involving the mid inferolateral wall. This  is a non-specific pattern.     6.  The patient is status post ascending aortic dissection repair. A dissection flap is noted within the  descending thoracic aorta.      CONCLUSIONS:      1) Moderate right ventricular dilatation with normal systolic function.     2) There is severe tricuspid regurgitation (regurgitant volume of 40 ml, corresponding to a regurgitant  fraction of 55%). The jet appears to originate in close proximity to the pacemaker lead attachment to the  septal leaflet.      3)  The patient is status post ascending aortic dissection repair. A dissection flap is noted within  the  descending thoracic aorta.        Physical Examination   /80 (BP Location: Right arm, Patient Position: Chair, Cuff Size: Adult Regular)   Pulse 82   Wt 70.4 kg (155 lb 3.2 oz)   SpO2 97%   BMI 27.06 kg/m    Wt Readings from Last 3 Encounters:   06/16/23 68.9 kg (152 lb)   06/15/23 69.9 kg (154 lb 1.6 oz)   06/09/23 71.1 kg (156 lb 12.8 oz)       CONSITUTIONAL: no acute distress  HEENT: no icterus, no redness or discharge, neck supple  CV: no visible edema of visualized extremities. No JVD.   RESPIRATORY: respirations nonlabored, no cough  NEURO: AA&Ox3, speech fluent/appropriate, motor grossly nonfocal  PSYCH: cooperative, affect appropriate  DERM: no rashes on visualized face/neck/upper extremities         Medications  Allergies   Current Outpatient Medications   Medication Sig Dispense Refill    Acetaminophen 325 MG CAPS Take 325-650 mg by mouth every 4 hours as needed      albuterol (PROAIR HFA/PROVENTIL HFA/VENTOLIN HFA) 108 (90 Base) MCG/ACT inhaler Inhale 2 puffs into the lungs every 6 hours as needed for shortness of breath / dyspnea or wheezing 18 g 0    aspirin (ASA) 81 MG EC tablet Take 81 mg by mouth every 24 hours      cholecalciferol 25 MCG (1000 UT) TABS Take 1,000 Units by mouth daily       cyanocobalamin (VITAMIN B-12) 100 MCG tablet Take 1,000 mcg by mouth daily       empagliflozin (JARDIANCE) 10 MG TABS tablet Take 1 tablet (10 mg) by mouth daily 90 tablet 3    escitalopram (LEXAPRO) 10 MG tablet Take 1 tablet (10 mg) by mouth daily 90 tablet 3    metoprolol tartrate (LOPRESSOR) 25 MG tablet Take 1 tablet (25 mg) by mouth 2 times daily 180 tablet 3    mirtazapine (REMERON) 15 MG tablet Take 1 tablet (15 mg) by mouth At Bedtime 90 tablet 3    spironolactone (ALDACTONE) 25 MG tablet Take 1 tablet (25 mg) by mouth daily 90 tablet 3    torsemide (DEMADEX) 10 MG tablet Take 4 tablets(40mg) by mouth every day 360 tablet 3    warfarin ANTICOAGULANT (COUMADIN) 2 MG tablet Take one tablet (2  mg) by mouth on Fridays; take two tablets (4 mg) all other days; or as instructed by ACC team. 170 tablet 1    Allergies   Allergen Reactions    Amoxicillin Swelling     Face and body    Ciprofloxacin Hives         Lab Results (Personally Reviewed)    Chemistry/lipid CBC Cardiac Enzymes/BNP/TSH/INR   Lab Results   Component Value Date    BUN 20.5 06/16/2023     06/16/2023    CO2 31 (H) 06/16/2023     Creatinine   Date Value Ref Range Status   06/16/2023 1.18 (H) 0.51 - 0.95 mg/dL Final   06/23/2021 1.29 (H) 0.52 - 1.04 mg/dL Final       Lab Results   Component Value Date    CHOL 134 06/14/2021    HDL 53 06/14/2021    LDL 67 06/14/2021      Lab Results   Component Value Date    WBC 6.5 06/08/2023    HGB 14.1 06/08/2023    HCT 43.6 06/08/2023    MCV 92 06/08/2023     06/08/2023    Lab Results   Component Value Date     (H) 03/12/2019    INR 2.1 (H) 06/19/2023        The patient states understanding and is agreeable with the plan.   Gaston Car MD [facility]  Cardiology - Electrophysiology      Total time spent on patient visit, reviewing notes, imaging, labs, orders, and completing necessary documentation: 60 minutes.

## 2023-07-05 NOTE — PATIENT INSTRUCTIONS
You were seen in the Electrophysiology Clinic today by: Dr Car    Plan:     -Pre anesthesia clinic visit - prior to procedure  - Pacemaker device/lead extraction  - Micra pacemaker implant    You are scheduled for a Pacemaker device/lead extraction & a Micra Pacemaker implant       Pre-Admission Phone Call will occur 1-2 days prior to procedure date.  You do not need to come to the [place].     Visitor Policy: Two visitors.      Date:   Time: ________________Arrive to [address] at the [facility]      1. Please review the attached instructions on showering before your procedure at the end of this letter.  2. 2. Your history and physical will be completed by our advanced practice provider when you arrive.  3. Please do not eat anything for 8 hours prior to your procedure. You may have sips of water up until 2 hours prior to your arrival.  4. The morning of your procedure, you may take your scheduled medications with a sip of water.  HOLD:   Warfarin x 2 days prior to procedure  Jardiance x 4 days prior to procedure  OK to also hold your Torsemide, and Spironolactone day of procedure for comfort.   5. You will receive general anesthesia for this procedure.   6. You will likely discharge the same day and need a .          Post-Procedure Instructions  Care of groin site:   Remove the Band-Aid after 24 hours. If there is minor oozing, apply another Band-aid and remove it after 12 hours.    Do NOT take a bath, use a hot tub, pool, or submerse in water for at least 3 days. You may shower.    It is normal to have a small bruise or lump at the site.   Do not scrub the site.   Do not use lotion or powder near the puncture site for 3 days.     For the first 2 days: Do not stoop or squat. When you cough, sneeze or move your bowels, hold your hand over the puncture site and press gently.   Do not lift more than 10 pounds or exertional activity for 10 days.  - No driving for 24 hours after (with or without  general anesthesia).     If you start bleeding from the site in your groin: Lie down flat and press firmly on the site. Call your physician immediately, or, come to the emergency room.    Call 911 right away if you have bleeding that is heavy or does not stop.  Call your doctor/provider if:    You have a large or growing hard lump around the site.    The site is red, swollen, hot or tender.    Blood or fluid is draining from the site.    You have chills or a fever greater than 101 F (38 C).    Your leg or arm turns bluish, feels numb or cool.    You have hives, a rash or unusual itching.          Follow Up Appointments Date & Time   7-10 day check with device clinic     3 month follow up visit with device check & provider         Showering Before Surgery   Your surgeon has asked you to take 2 showers before surgery.  Why is this important?  It is normal for bacteria (germs) to be on your skin. The skin protects us from these germs. When you have surgery, we cut the skin. Sometimes germs get into the cuts and cause infection (illness caused by germs). By following the instructions below and using special soap, you will lower the number of germs on your skin. This decreases your chance of infection.  Special soap  Buy or get 8 ounces of antiseptic surgical soap called 4% CHG. Common name brands of this soap are Hibiclens and Exidine.   You can find it at your local pharmacy, clinic or retail store. If you have trouble, ask your pharmacist to help you find the right substitute.   A note about shaving:  Do not shave within 12 inches of your incision (surgical cut) area for at least 3 days before surgery. Shaving can make small cuts in the skin. This puts you at a higher risk of infection.  Items you will need for each shower:   1 newly washed towel   4 ounces of one of the above soaps  Follow these instructions:  The evening before surgery   1. Wash your hair and body with your regular shampoo and soap. Make sure you  rinse the shampoo and soap from your hair and body.   2. Using clean hands, apply about 2 ounces of soap gently on your skin from the neck to your toes. Use on your groin area last. Do not use this soap on your face or head. If you get any soap in your eyes, ears or mouth, rinse right away.   3. Repeat step 2. It is very important to let the soap stay on your skin for at least 1 minute.   4. Rinse well and dry off using a clean towel.If you feel any tingling, itching or other irritation, rinse right away. It is normal to feel some coolness on the skin after using the antiseptic soap. Your skin may feel a bit dry after the shower, but do not use any lotions, creams or moisturizers. Do not use hair spray or other products in your hair.  5. Dress in freshly washed clothes or pajamas. Use fresh pillowcases and sheets on your bed.     The morning of surgery  1. Wash your hair and body with your regular shampoo and soap. Make sure you rinse the shampoo and soap from your hair and body.   2. Using clean hands, apply about 2 ounces of soap gently on your skin from the neck to your toes. Use on your groin area last. Do not use this soap on your face or head. If you get any soap in your eyes, ears or mouth, rinse right away.   3. Repeat step 2. It is very important to let the soap stay on your skin for at least 1 minute.   4. Rinse well and dry off using a clean towel.If you feel any tingling, itching or other irritation, rinse right away. It is normal to feel some coolness on the skin after using the antiseptic soap. Your skin may feel a bit dry after the shower, but do not use any lotions, creams or moisturizers. Do not use hair spray or other products in your hair.  5. Dress in clean clothes.                           If you have further questions, please utilize [employer] to contact us.     Your Care Team:     Cardiology   Telephone Number     Nurse Line  KIKE Lacey RN   [phone]     For  scheduling appointments:   Yajaira   [phone]   For procedure scheduling:    Saige Álvarez     [phone]   For the Device Clinic (Pacemakers, ICDs, Loop Recorders)    During business hours: [phone]  After business hours:   [phone]- select option 4 and ask for job code [id number].       On-call cardiologist for after hours or on weekends:   [phone], option #4, and ask to speak to the on-call cardiologist.     Cardiovascular Clinic:   [address] [place] [address]. [place] [address]      As always, Thank you for trusting us with your health care needs!

## 2023-07-05 NOTE — PROGRESS NOTES
ELECTROPHYSIOLOGY CLINIC VISIT    Assessment/Recommendations   Assessment/Plan:    Ms. Funez is an 82 year old female who has a medical history significant for HFpEF, severe TR, permanent AF s/p AVN ablation with PPM implant 1/11/19, emergent aortic dissection repair 1/4/19, HTN, and obesity.     Permanent Atrial Fibrillation s/p AVN ablation with PPM  Torrential TR:  1. Stroke Prophylaxis:  CHADSVASC=++age, +gender, +HTN  4, corresponding to a 4.0% annual stroke / systemic emolism event rate. indicating need for long term oral anticoagulation.  She is appropriately on Warfarin. Follows with [facility] Clinic.   2. Rate Control: intrinsically well rate controlled due to AVN ablation.   3. Rhythm Control: Permanent AF s/p AVN ablation.   4. Risk Factor Management: Statin, BP control, and MANNY evaluation as indicated.      5. PPM: normal device function, stable lead parameters, and  100%. Having torrential TR with RV pacemaker lead restricting coaptation of septal and anterior leaflets. Discussed that removal of PPM lead may help alleviate some of the TR. Discussed that this isn't [place]entee. We had a long discussion with the patient about lead extraction.  We discussed the indications for lead extraction, the extraction procedure and the risks of extraction.  These risks include vascular tear, cardiac tear, clotting and occlusion of a vessel, infection, damage to other components of the implanted device, bleeding, death, and need for emergency cardiopulmonary bypass, sternotomy or thoracotomy.  The patient understands and wishes to proceed. Given pacing dependence due to prior AVN ablation, we recommended leadless PPM so no pacing lead would interfere with the TV. We also discussed leadless PPM implant in detail risks, benefits and she states understanding and is agreeable to proceed with PPM extraction and leadless PPM implant.       Follow up 3 months post procedure.        History of Present Illness/Subjective     Ms. Priya Funez is a 82 year old female who comes in today for EP consultation of device management .    Ms. Funez is an 82 year old female who has a medical history significant for HFpEF, severe TR, permanent AF s/p AVN ablation with PPM implant 1/11/19, emergent aortic dissection repair 1/4/19, HTN, and obesity.     She underwent an emergent aortic dissection repair in 2019 with a prolonged hospitalization with difficult vent weaning requiring tracheostomy. She also has AF which became permanent and she had AVN ablation and PPM implanted in 2019. She has HFpEF. She has developed severe TR. She had a ALEX to identify mechanism of TR which showed massive TR with RV pacemaker lead restricting coaptation of septal and anterior leaflets. Thus, she was referred for consideration for extraction of pacemaker lead.     She reports feeling ALEXANDER/SOB. She denies chest discomfort, palpitations, abdominal fullness/bloating or peripheral edema, paroxysmal nocturnal dyspnea, orthopnea, lightheadedness, dizziness, pre-syncope, or syncope. Device interrogation shows normal device function, stable lead parameters, no arrhyhmias recorded, and  100%. Current cardiac medications include: Spironolactone, Metoprolol, Jardiance, Torsemide, ASA, and Warfarin.       Device info:  Medtronic model W1 SRO1, serial number [id number].      Ventricular lead was Medtronic model 5076-52, serial number [id number]  [id number],     I have reviewed and updated the patient's Past Medical History, Social History, Family History and Medication List.     Cardiographics (Personally Reviewed) :   6/16/23 Echo:   Interpretation Summary     1. The left ventricle is normal in size. Left ventricular function is  normal.The ejection fraction is 55-60%.  2. The right ventricle is mildly dilated.Mildly decreased right ventricular  systolic function  3. 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  4. Massive tricuspid regurgitation related  to septal leaflet restriction from  RV pacemaker lead causing lack of coaptation. Coaptation gap is 1.2 cm. Septal  leaflet is mobile, non-calcified and non-sclerotic.     Compared to the prior study dated 6/2/2023, RV size was measure at midly  dilated on current study. Otherwise no signficant changes.    5/2/23 CMR:  1. The LV is normal in cavity size and wall thickness. The global systolic function is normal. The LVEF is  72%. Paradoxical septal motion is noted.      2. The RV is moderately dilated. The global systolic function is normal. The RVEF is 71%.  A pacemaker lead  is noted.     3. There is severe right atrial enlargement.      4. There is severe tricuspid regurgitation (regurgitant volume of 40 ml, corresponding to a regurgitant  fraction of 55%). The jet appears to originate in close proximity to the pacemaker lead attachment to the  septal leaflet.      5. There is a small area of midwall late gadolinium enhancement involving the mid inferolateral wall. This  is a non-specific pattern.     6.  The patient is status post ascending aortic dissection repair. A dissection flap is noted within the  descending thoracic aorta.      CONCLUSIONS:      1) Moderate right ventricular dilatation with normal systolic function.     2) There is severe tricuspid regurgitation (regurgitant volume of 40 ml, corresponding to a regurgitant  fraction of 55%). The jet appears to originate in close proximity to the pacemaker lead attachment to the  septal leaflet.      3)  The patient is status post ascending aortic dissection repair. A dissection flap is noted within the  descending thoracic aorta.        Physical Examination   /80 (BP Location: Right arm, Patient Position: Chair, Cuff Size: Adult Regular)   Pulse 82   Wt 70.4 kg (155 lb 3.2 oz)   SpO2 97%   BMI 27.06 kg/m    Wt Readings from Last 3 Encounters:   06/16/23 68.9 kg (152 lb)   06/15/23 69.9 kg (154 lb 1.6 oz)   06/09/23 71.1 kg (156 lb 12.8 oz)        CONSITUTIONAL: no acute distress  HEENT: no icterus, no redness or discharge, neck supple  CV: no visible edema of visualized extremities. No JVD.   RESPIRATORY: respirations nonlabored, no cough  NEURO: AA&Ox3, speech fluent/appropriate, motor grossly nonfocal  PSYCH: cooperative, affect appropriate  DERM: no rashes on visualized face/neck/upper extremities         Medications  Allergies   Current Outpatient Medications   Medication Sig Dispense Refill     Acetaminophen 325 MG CAPS Take 325-650 mg by mouth every 4 hours as needed       albuterol (PROAIR HFA/PROVENTIL HFA/VENTOLIN HFA) 108 (90 Base) MCG/ACT inhaler Inhale 2 puffs into the lungs every 6 hours as needed for shortness of breath / dyspnea or wheezing 18 g 0     aspirin (ASA) 81 MG EC tablet Take 81 mg by mouth every 24 hours       cholecalciferol 25 MCG (1000 UT) TABS Take 1,000 Units by mouth daily        cyanocobalamin (VITAMIN B-12) 100 MCG tablet Take 1,000 mcg by mouth daily        empagliflozin (JARDIANCE) 10 MG TABS tablet Take 1 tablet (10 mg) by mouth daily 90 tablet 3     escitalopram (LEXAPRO) 10 MG tablet Take 1 tablet (10 mg) by mouth daily 90 tablet 3     metoprolol tartrate (LOPRESSOR) 25 MG tablet Take 1 tablet (25 mg) by mouth 2 times daily 180 tablet 3     mirtazapine (REMERON) 15 MG tablet Take 1 tablet (15 mg) by mouth At Bedtime 90 tablet 3     spironolactone (ALDACTONE) 25 MG tablet Take 1 tablet (25 mg) by mouth daily 90 tablet 3     torsemide (DEMADEX) 10 MG tablet Take 4 tablets(40mg) by mouth every day 360 tablet 3     warfarin ANTICOAGULANT (COUMADIN) 2 MG tablet Take one tablet (2 mg) by mouth on Fridays; take two tablets (4 mg) all other days; or as instructed by ACC team. 170 tablet 1    Allergies   Allergen Reactions     Amoxicillin Swelling     Face and body     Ciprofloxacin Hives         Lab Results (Personally Reviewed)    Chemistry/lipid CBC Cardiac Enzymes/BNP/TSH/INR   Lab Results   Component Value Date    BUN  20.5 06/16/2023     06/16/2023    CO2 31 (H) 06/16/2023     Creatinine   Date Value Ref Range Status   06/16/2023 1.18 (H) 0.51 - 0.95 mg/dL Final   06/23/2021 1.29 (H) 0.52 - 1.04 mg/dL Final       Lab Results   Component Value Date    CHOL 134 06/14/2021    HDL 53 06/14/2021    LDL 67 06/14/2021      Lab Results   Component Value Date    WBC 6.5 06/08/2023    HGB 14.1 06/08/2023    HCT 43.6 06/08/2023    MCV 92 06/08/2023     06/08/2023    Lab Results   Component Value Date     (H) 03/12/2019    INR 2.1 (H) 06/19/2023        The patient states understanding and is agreeable with the plan.   Gaston Car MD [facility]  Cardiology - Electrophysiology      Total time spent on patient visit, reviewing notes, imaging, labs, orders, and completing necessary documentation: 60 minutes.

## 2023-07-07 NOTE — TELEPHONE ENCOUNTER
EP Scheduling called the patient to schedule Laser Lead Extraction with Dr. Car. The number [phone] was left for the patient to return the call and schedule the procedure.    Saige Álvarez  Periop Electrophysiology   [phone]

## 2023-07-10 NOTE — PROGRESS NOTES
ANTICOAGULATION MANAGEMENT     Priya DAMON Armin 82 year old female is on warfarin with therapeutic INR result. (Goal INR 2.0-3.0)    Recent labs: (last 7 days)     07/10/23  1439   INR 2.0*       ASSESSMENT       Source(s): Chart Review and Patient/Caregiver Call       Warfarin doses taken: Warfarin taken as instructed    Diet: No new diet changes identified    Medication/supplement changes: None noted    New illness, injury, or hospitalization: No    Signs or symptoms of bleeding or clotting: No    Previous result: Therapeutic last 2(+) visits    Additional findings: Upcoming surgery/procedure 7/31/23 lead extraction and leadless PPM placement. Encounter routed to Maggy GATES on 7/10/23.         PLAN     Recommended plan for no diet, medication or health factor changes affecting INR     Dosing Instructions: Continue your current warfarin dose until notified to start hold prior to surgery with next INR in 4 weeks       Summary  As of 7/10/2023    Full warfarin instructions:  4 mg every Sun, Thu; 2 mg all other days   Next INR check:  8/7/2023             Telephone call with Priya who verbalizes understanding and agrees to plan    Lab visit scheduled    Education provided:     Please call back if any changes to your diet, medications or how you've been taking warfarin    Contact [phone]  with any changes, questions or concerns.     Plan made per [facility] anticoagulation protocol    Amara Villarreal RN  Anticoagulation Clinic  7/10/2023    _______________________________________________________________________     Anticoagulation Episode Summary     Current INR goal:  2.0-3.0   TTR:  66.1 % (1 y)   Target end date:  Indefinite   Send INR reminders to:  [facility]    Indications    Chronic atrial fibrillation (H) [I48.20]  Current use of long term anticoagulation (Resolved) [Z79.01]           Comments:           Anticoagulation Care Providers     Provider Role Specialty Phone number    Sharmaine Burks  MD [facility] [phone]

## 2023-07-10 NOTE — TELEPHONE ENCOUNTER
"Patient is having procedure for PPM lead extraction and then leadless PPM placement on 7/31/23. Routing encounter to Anticoagulation Pharmacist for assessment of warfarin hold/bridge/restart.    Patient thought the hold would just be for 2 days (similar to her angiogram) but then went on to say if they have trouble removing the lead they may end up having to \"do open heart\". This writer informed patient it sounded like more than a 2-day hold would be needed. We will contact her once plan is finalized.    Amara JHA RN  Anticoagulation Team    "

## 2023-07-13 NOTE — TELEPHONE ENCOUNTER
FUTURE VISIT INFORMATION      SURGERY INFORMATION:    Date: 23    Location: uu or    Surgeon:  Gaston Car MD Shaffer, Andrew Wesley, MD    Anesthesia Type:  mac    Procedure: Pacemaker Leadless Device Implant EXTRACTION, ELECTRODE LEAD, MYOCARDIAL, USING LASER, WITH ANESTHESIA    RECORDS REQUESTED FROM:       Primary Care Provider: Sharmaine Burks MD- [facility]    Most recent EKG+ Tracin/10/23    Most recent ECHO: 23

## 2023-07-17 NOTE — TELEPHONE ENCOUNTER
"CELESTINO-PROCEDURAL ANTICOAGULATION  MANAGEMENT    ASSESSMENT     Warfarin interruption plan for pacemaker leadless device implant on 7/31/23.    Indication for Anticoagulation: Atrial Fibrillation      [id number] = 4 (Hypertension, Age >= 75 and Female)      Celestino-Procedure Risk stratification for thromboembolism: low (2022 Chest guidelines)    AFIB: 2022 CHEST Perioperative Management guidelines recommends against bridging for patients with atrial fibrillation except in high risk stratification patients.     RECOMMENDATION       Pre-Procedure:  o Hold warfarin for 2 days, until after procedure starting: Saturday, July 29   o No Bridge      Post-Procedure:  o Resume warfarin dose if okay with provider doing procedure on night of procedure, Monday, July 31 PM  o Recheck INR ~ 7 days after resuming warfarin       Two day hold outlined in instructions section of 7/5/23 OV with Dr. Car  ?   [facility]    SUBJECTIVE/OBJECTIVE     Priya Funez, a 82 year old female    Goal INR Range: 2.0-3.0     Patient bridged in past: No      Wt Readings from Last 3 Encounters:   07/05/23 70.4 kg (155 lb 3.2 oz)   06/16/23 68.9 kg (152 lb)   06/15/23 69.9 kg (154 lb 1.6 oz)      Ideal body weight: 53.5 kg (118 lb 0.9 oz)  Adjusted ideal body weight: 60.3 kg (132 lb 14.6 oz)     Estimated body mass index is 27.06 kg/m  as calculated from the following:    Height as of 6/16/23: 1.613 m (5' 3.5\").    Weight as of 7/5/23: 70.4 kg (155 lb 3.2 oz).    Lab Results   Component Value Date    INR 2.0 (H) 07/10/2023    INR 2.1 (H) 06/19/2023    INR 2.05 (H) 06/08/2023     Lab Results   Component Value Date    HGB 14.1 06/08/2023    HCT 43.6 06/08/2023     06/08/2023     Lab Results   Component Value Date    CR 1.18 (H) 06/16/2023    CR 1.34 (H) 03/23/2023    CR 1.22 (H) 01/23/2023     Estimated Creatinine Clearance: 36.2 mL/min (A) (based on SCr of 1.18 mg/dL (H)).    "

## 2023-07-26 NOTE — TELEPHONE ENCOUNTER
Priya was scheduled for an in-person visit, however Yoly Platt NP for the PAC clinic is only doing video visits on 7/28.  Left a detailed voice message for Priya that her appointment will be changed to a video visit and to please call the PAC clinic with any issues.  Will follow up.  Alisha Pablo RN

## 2023-07-27 NOTE — PHARMACY - PREOPERATIVE ASSESSMENT CENTER
Anticoagulation Note - Preoperative Assessment Center (PAC) Pharmacist     Patient was interviewed on July 27, 2023 prior to scheduled PAC clinic appointment. The purpose of this note is to document the perioperative anticoagulation plan outlined by the providers caring for Priya Funez.     Current Regimen  Anticoagulation Regimen as of July 27, 2023: Warfarin 4mg by mouth on Sunday and Thursdays and 2mg by mouth rest of the week  Indication: afib  Prescriber:  Dr. Burks  Expected Duration of therapy: Indefinite  Current medications that may interact with this include: None  INR Goal: 2-3    Creatinine   Date Value Ref Range Status   06/16/2023 1.18 (H) 0.51 - 0.95 mg/dL Final   06/23/2021 1.29 (H) 0.52 - 1.04 mg/dL Final       Perioperative plan  Priya Funez is scheduled for [employer] Leadless Device Implant on 7/31/23 with Dr. Car and the perioperative anticoagulation plan outlined by her anticoagulation clinic is to hold warfarin for 48 hours with no bridge. Last dose of warfarin will be on 7/28.     Resumption of anticoagulation after procedure will be based on surgery team assessment of bleeding risks and complications.  This plan may require re-assessment and modification by her primary team in the perioperative setting depending on patients clinical situation.        Tyree Kowalski RPH  July 27, 2023  12:48 PM

## 2023-07-27 NOTE — TELEPHONE ENCOUNTER
FUTURE VISIT INFORMATION        SURGERY INFORMATION:  Date: 23  Location: uu or  Surgeon:  Gaston Car MD Shaffer, Andrew Wesley, MD  Anesthesia Type:  mac  Procedure: Pacemaker Leadless Device Implant EXTRACTION, ELECTRODE LEAD, MYOCARDIAL, USING LASER, WITH ANESTHESIA     RECORDS REQUESTED FROM:         Primary Care Provider: Sharmaine Burks MD- [facility]     Most recent EKG+ Tracin/10/23     Most recent ECHO: 23

## 2023-07-27 NOTE — TELEPHONE ENCOUNTER
Hue Powers her PAC appointment on 7/28 will need to be a video visit.  Patient rescheduled to the 1:30 pm time slot so her daughter can be present to assist with technology aspect.  Alisha Pablo RN

## 2023-07-28 NOTE — PROGRESS NOTES
Priya is a 82 year old who is being evaluated via a billable video visit.      How would you like to obtain your AVS? Mail a copy  If the video visit is dropped, the invitation should be resent by: Text to cell phone: [phone]          HPI               Review of Systems               Physical Exam

## 2023-07-28 NOTE — PATIENT INSTRUCTIONS
Preparing for Your Surgery      Name:  Priya Funez   MRN:  [id number]   :  1940   Today's Date:  2023       Arriving for surgery:  Surgery date:  2023  Arrival time:  6:00 AM    Please come to:     Please come to:      [facility] [address]  [address] [place]  [address]      The [facility] Patient /Visitor Ramp is located at [address]. Patients and visitors who self-park will receive the reduced hospital parking rate. If the Patient /Visitor Ramp is full, please follow the signs to the  parking located at the main hospital entrance.     parking is available ( 24 hours/ 7 days a week)    Discounted parking pass options are available for patients and visitors. They can be purchased at the [facility] desk at the main hospital entrance.    -    Stop at the security desk and they will direct surgery patients to the 3rd floor Surgery Waiting Room. [phone]     -  If you are in need of directions, wheelchair or escort please stop at the Information/security desk in the lobby.       What can I eat or drink?  -  You may eat and drink normally up to 8 hours prior to arrival time. (Until 10:00 PM 2023)  -  You may have clear liquids until 2 hours prior to arrival time. (Until 4:00 AM)    Examples of clear liquids:  Water  Clear broth  Juices (apple, white grape, white cranberry  and cider) without pulp  Noncarbonated, powder based beverages  (lemonade and Leroy-Aid)  Sodas (Sprite, 7-Up, ginger ale and seltzer)  Coffee or tea (without milk or cream)  Gatorade    -  No Alcohol or cannabis products for at least 24 hours before surgery.     Which medicines can I take?    **Hold Aspirin for 7 days before surgery.   **Hold Multivitamins for 7 days before surgery. (Vitamin B12, Vitamin D)  Hold Supplements for 7 days before surgery.  Hold Ibuprofen (Advil, Motrin) for 1 day(s) before surgery--unless  otherwise directed by surgeon.  Hold Naproxen (Aleve) for 4 days before surgery.  **Hold Warfarin for 48 hours before surgery. Last dose 7/28    -  DO NOT take these medications the day of surgery: (Please bring these medications with you day of surgery)  Spironolactone  Torsemide    -  PLEASE TAKE these medications the day of surgery:  Tylenol if needed  Albuterol if needed  Lexapro  Metoprolol      How do I prepare myself?  - Please take 2 showers (one the night prior to surgery and one the morning of surgery) using Scrubcare or Hibiclens soap.    Use this soap only from the neck to your toes.     Leave the soap on your skin for one minute--then rinse thoroughly.      You may use your own shampoo and conditioner. No other hair products.   - Please remove all jewelry and body piercings.  - No lotions, deodorants or fragrance.  - No makeup or fingernail polish.   - Bring your ID and insurance card.    -If you have a Deep Brain Stimulator, Spinal Cord Stimulator, or any Neuro Stimulator device---you must bring the remote control to the hospital.      ALL PATIENTS GOING HOME THE SAME DAY OF SURGERY ARE REQUIRED TO HAVE A RESPONSIBLE ADULT TO DRIVE AND BE IN ATTENDANCE WITH THEM FOR 24 HOURS FOLLOWING SURGERY.    Covid testing policy as of 12/06/2022  Your surgeon will notify and schedule you for a COVID test if one is needed before surgery--please direct any questions or COVID symptoms to your surgeon      Questions or Concerns:    - For any questions regarding the day of surgery or your hospital stay, please contact the Pre Admission Nursing Office at [phone].       - If you have health changes between today and your surgery, please call your surgeon.       - For questions after surgery, please call your surgeons office.           Current Visitor Guidelines    You may have 2 visitors in the pre op area.    Visiting hours: 8 a.m. to 8:30 p.m.    You may have four visitors during your inpatient hospital  stay.    Patients confirmed or suspected to have symptoms of COVID 19 or flu:     No visitors allowed for adult patients.   Children (under age 18) can have 1 named visitor.     People who are sick or showing symptoms of COVID 19 or flu:    Are not allowed to visit patients--we can only make exceptions in special situations.       Please follow these guidelines for your visit:          Please maintain social distance          Masking is optional--however at times you may be asked to wear a mask for the safety of yourself and others     Clean your hands with alcohol hand . Do this when you arrive at and leave the building and patient room,    And again after you touch your mask or anything in the room.     Go directly to and from the room you are visiting.     Stay in the patient s room during your visit. Limit going to other places in the hospital as much as possible     Leave bags and jackets at home or in the car.     For everyone s health, please don t come and go during your visit. That includes for smoking   during your visit.

## 2023-07-28 NOTE — H&P
Pre-Operative H & P     CC:  Preoperative exam to assess for increased cardiopulmonary risk while undergoing surgery and anesthesia.    Date of Encounter: 7/28/2023  Primary Care Physician:  Sharmaine Burks     Reason for visit:   Encounter Diagnoses   Name Primary?     Preop examination Yes     Cardiac pacemaker in situ      Severe tricuspid regurgitation      Displacement of atrial pacemaker leads, initial encounter      Complete heart block (H)        DEBORAH  Priya Funez is a 82 year old female who presents for pre-operative H & P in preparation for  Procedure Information     Case: [id number] Date/Time: 07/31/23 0800    Procedures:       Pacemaker Leadless Device Implant (Heart)      EXTRACTION, ELECTRODE LEAD, MYOCARDIAL, USING LASER (Heart)    Anesthesia type: MAC    Diagnosis:       Cardiac pacemaker in situ [Z95.0]      Severe tricuspid regurgitation [I07.1]      Atrial pacemaker lead displacement [T82.120A]      Complete heart block (H) [I44.2]    Pre-op diagnosis:       Cardiac pacemaker in situ [Z95.0]      Severe tricuspid regurgitation [I07.1]      Atrial pacemaker lead displacement [T82.120A]      Complete heart block (H) [I44.2]    Location:  OR [address] [place] [address] OR    Providers: Gaston Car MD          Priya Funez is an 82-year-old female who was seen by Dr. Car on 7/5/2023 in the  Electrophysiology Clinic as a new patient EP consultation for device management.  The patient has a h/o HFpEF, severe TR, permanent AF s/p AVN ablation with PPM implant 1/11/19 and emergent aortic dissection repair 1/4/19.  The patient has been being followed for her tricuspid regurgitation and more recently, she has not has as much stamina with mildly increasing dyspnea on exertion.The patient's ALEX this past June showed massive TR with RV pacemaker lead restricting coaptation of septal and anterior leaflets.She was then referred to Dr. Upton for consideration of extraction of pacemaker lead.  Dr. Car counseled the  patient on the findings and treatment options.  The patient has elected to proceed with the above surgical intervention.      The patient presents to the PAC virtually today with her daughter, Susie, in preparation for the above scheduled procedure with comorbid conditions as above and HTN.      History is obtained from the patient and chart review    Hx of abnormal bleeding or anti-platelet use: warfarin and ASA     Menstrual history: No LMP recorded. Patient is postmenopausal.:      Past Medical History  Past Medical History:   Diagnosis Date     Antiplatelet or antithrombotic long-term use      Benign essential hypertension      Cardiac pacemaker in situ     Medtronic     Chronic atrial fibrillation (H)     s/p AV node ablation and PPM placement January, 2019     Chronic diastolic heart failure (H)      Chronic kidney disease, stage III (moderate) (H)      Chronic right-sided heart failure (H)      Congestive heart failure (H)      NEDA (generalized anxiety disorder)      History of aortic dissection     s/p emergent repair 2019     History of blood transfusion     no adverse reactions     Hypertension      Mild mitral regurgitation      Obesity      Severe tricuspid regurgitation        Past Surgical History  Past Surgical History:   Procedure Laterality Date     ANGIOGRAM Right 01/04/2019    Procedure: DIAGONSTIC ANGIOGRAM OF SMA;  Surgeon: Rigo Jennings MD;  Location:  OR     BIOPSY BREAST       BYPASS GRAFT ARTERY CORONARY, REPAIR VALVE AORTIC, COMBINED N/A 01/04/2019    Procedure: AORTIC DISSECTION REPAIR WITH GRAFT- HEMASHIELD PLATINUM WOVEN DOUBLE VELOR 4 SIDE ARM VASCULAR GRAFT, D:35 X 12 X 10 X 10MM/ L:50CM;  Surgeon: Jose Winslow MD;  Location: [facility] OR     EP ABLATION AV NODE N/A 01/11/2019    Procedure: EP Ablation AV Node;  Surgeon: Tsering Davey MD;  Location:  HEART CARDIAC CATH LAB     EP PACEMAKER Left 01/11/2019    Procedure: EP Pacemaker;  Surgeon: Tsering Davey MD;  Location:   HEART CARDIAC CATH LAB     THYROID SURGERY      benign mass removed     TRACHEOSTOMY N/A 01/25/2019    Procedure: TRACHEOSTOMY  (DR MCCLELLAND);  Surgeon: Bryson Mcclelland MD;  Location:  OR     TRANSESOPHAGEAL ECHOCARDIOGRAM INTRAOPERATIVE N/A 6/16/2023    Procedure: ECHOCARDIOGRAM,TRANSESOPHAGEAL,WITH ANESTHESIA;  Surgeon: Dennis Meier DO;  Location: [facility] [place] OR     TUBAL LIGATION         Prior to Admission Medications  Current Outpatient Medications   Medication Sig Dispense Refill     Acetaminophen 325 MG CAPS Take 325-650 mg by mouth every 4 hours as needed       albuterol (PROAIR HFA/PROVENTIL HFA/VENTOLIN HFA) 108 (90 Base) MCG/ACT inhaler Inhale 2 puffs into the lungs every 6 hours as needed for shortness of breath / dyspnea or wheezing 18 g 0     aspirin (ASA) 81 MG EC tablet Take 81 mg by mouth every 24 hours       cholecalciferol 25 MCG (1000 UT) TABS Take 1,000 Units by mouth daily        cyanocobalamin (VITAMIN B-12) 100 MCG tablet Take 1,000 mcg by mouth daily        empagliflozin (JARDIANCE) 10 MG TABS tablet Take 1 tablet (10 mg) by mouth daily (Patient not taking: Reported on 7/5/2023) 90 tablet 3     escitalopram (LEXAPRO) 10 MG tablet Take 1 tablet (10 mg) by mouth daily 90 tablet 3     metoprolol tartrate (LOPRESSOR) 25 MG tablet Take 1 tablet (25 mg) by mouth 2 times daily 180 tablet 3     mirtazapine (REMERON) 15 MG tablet Take 1 tablet (15 mg) by mouth At Bedtime 90 tablet 3     spironolactone (ALDACTONE) 25 MG tablet Take 1 tablet (25 mg) by mouth daily 90 tablet 3     torsemide (DEMADEX) 10 MG tablet Take 4 tablets(40mg) by mouth every day 360 tablet 3     warfarin ANTICOAGULANT (COUMADIN) 2 MG tablet Take one tablet (2 mg) by mouth on Fridays; take two tablets (4 mg) all other days; or as instructed by ACC team. (Patient taking differently: 4mg Sundays and Thursdays and 2mg by mouth rest of the week) 170 tablet 1       Allergies  Allergies   Allergen Reactions      Amoxicillin Swelling     Face and body     Ciprofloxacin Hives       Social History  Social History     Socioeconomic History     Marital status:      Spouse name: Not on file     Number of children: Not on file     Years of education: Not on file     Highest education level: Not on file   Occupational History     Occupation: Retired - customer service   Tobacco Use     Smoking status: Never     Smokeless tobacco: Never   Vaping Use     Vaping Use: Never used   Substance and Sexual Activity     Alcohol use: Not Currently     Drug use: No     Sexual activity: Not on file   Other Topics Concern     Parent/sibling w/ CABG, MI or angioplasty before 65F 55M? Not Asked   Social History Narrative    .    Lives in home with . Fully independent.    4 adult children.    8 grandchildren.    Walks 2-3x/week.     Social Determinants of Health     Financial Resource Strain: Not on file   Food Insecurity: Not on file   Transportation Needs: Not on file   Physical Activity: Not on file   Stress: Not on file   Social Connections: Not on file   Intimate Partner Violence: Not on file   Housing Stability: Not on file       Family History  Family History   Problem Relation Age of Onset     Ataxia Mother      Heart Disease Father      Diabetes No family hx of      Myocardial Infarction No family hx of      Cerebrovascular Disease No family hx of      Coronary Artery Disease Early Onset No family hx of      Breast Cancer No family hx of      Colon Cancer No family hx of      Ovarian Cancer No family hx of        Review of Systems  The complete review of systems is negative other than noted in the HPI or here.   Anesthesia Evaluation   Pt has had prior anesthetic. Type: General and MAC.    History of anesthetic complications  - difficult airway.  Glidescope used in the past. .    ROS/MED HX  ENT/Pulmonary:  - neg pulmonary ROS     Neurologic:  - neg neurologic ROS     Cardiovascular: Comment: H/o Emergent aortic  dissection repair in 2019 with a prolonged hospitalization with difficult vent weaning requiring tracheostomy.    (+) hypertension-----Taking blood thinners CHF etiology: HFpEF - non ischemic pacemaker, Reason placed: complete heart block. - Patient is dependent on pacemaker. dysrhythmias (s/p AVN ablation ), a-fib, valvular problems/murmurs Severe TR. Previous cardiac testing  (-) dyslipidemia   METS/Exercise Tolerance: 3 - Able to walk 1-2 blocks without stopping Comment: Goes outside and potter flowers.     Hematologic:     (+) history of blood transfusion, no previous transfusion reaction, Known PRBC Anitbodies:No  (-) history of blood clots   Musculoskeletal: Comment: Right foot Bunionectomy       GI/Hepatic:  - neg GI/hepatic ROS     Renal/Genitourinary:     (+) renal disease, type: CRI,     Endo: Comment: Lab Results       Component                Value               Date                       A1C                      5.6                 01/30/2019                 A1C                      5.3                 01/04/2019                  Psychiatric/Substance Use:     (+) psychiatric history anxiety  (-) alcohol abuse history and chronic opioid use history   Infectious Disease:  - neg infectious disease ROS     Malignancy:  - neg malignancy ROS     Other:      (+) , other significant disability Other (comment) (Uses a walker. ),          Virtual visit -  No vitals were obtained    Physical Exam  Constitutional: Awake, alert, cooperative, no apparent distress, and appears stated age.  Eyes: Pupils equal  HENT: Normocephalic  Respiratory: non labored breathing   Neurologic: Awake, alert, oriented to name, place and time.   Neuropsychiatric: Calm, cooperative. Normal affect.      Prior Labs/Diagnostic Studies   All labs and imaging personally reviewed    Latest Reference Range & Units 06/16/23 10:51   Sodium 136 - 145 mmol/L 139   Potassium 3.4 - 5.3 mmol/L 3.5   Chloride 98 - 107 mmol/L 97 (L)   Carbon Dioxide  (CO2) 22 - 29 mmol/L 31 (H)   Urea Nitrogen 8.0 - 23.0 mg/dL 20.5   Creatinine 0.51 - 0.95 mg/dL 1.18 (H)   GFR Estimate >60 mL/min/1.73m2 46 (L)   Calcium 8.8 - 10.2 mg/dL 9.5   Anion Gap 7 - 15 mmol/L 11   Albumin 3.5 - 5.2 g/dL 3.9   Protein Total 6.4 - 8.3 g/dL 6.8   Alkaline Phosphatase 35 - 104 U/L 97   ALT 0 - 50 U/L 12   AST 0 - 45 U/L 22   Bilirubin Total <=1.2 mg/dL 1.5 (H)   Glucose 70 - 99 mg/dL 83   (L): Data is abnormally low  (H): Data is abnormally high    PROCEDURES  Cardiac Device Check 7/5/2023  Narrative & Impression    Patient seen in clinic for evaluation and iterative programming of their pacemaker per MD orders.     Device: Medtronic [id number] Nicole XT SR MRI  Normal device function  Mode: VVIR  bpm  : 100%  Intrinsic Rhythm:  30 bpm  Short V-V intervals: 0  Lead Trends Appear Stable: Yes  Estimated battery longevity to RRT = 8.8 years./Battery voltage = 2.93 V.   Atrial Arrhythmia: None  AF Conifer = 0%  Anticoagulant: Warfarin  Ventricular Arrhythmia: None  Setting Changes: None  Patient has an appointment to see Dr. Car today.      Plan: Resume device f/u in [place] as previously scheduled.   BALJINDER Jaramillo RN          EKG 6/8/2023  Atrial flutter with ventricular pacing   Abnormal ECG   When compared with ECG of 04-APR-2019 17:21,   No significant change was found   Confirmed by PARI CASTILLO, TRESSA LOC:JN ([id number]) on 6/8/2023 4:11:32 PM       ALEX 6/16/23   Interpretation Summary     Mid and deep esophageal imaging of tricuspid valve was challenging given  intracardiac calcification and rotation of heart within chest cavity. Adequate  transgastric views obtained.     1. The left ventricle is normal in size. Left ventricular function is normal.  The ejection fraction is 55-60%.  2. The right ventricle is mildly dilated. Mildly decreased right ventricular  systolic function  3. 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 Left to right atrial shunt, small A patent  foramen ovale is present. No thrombus is  detected in the left atrial  appendage.  4. Massive tricuspid regurgitation. The RV pacemaker lead is restricting  coaptation of the septal leaf with anterior leaflet (noted on transgastric  short axis, image 119). Coaptation gap is 1.4 cm. Septal leaflet appears  mobile and non-calcified.    CMR 5/2/23    SUMMARY   Clinical history: Severe tricuspid regurgitation, status post aortic dissection repair 2019.   Comparison CMR: None     1. The LV is normal in cavity size and wall thickness. The global systolic function is normal. The LVEF is  72%. Paradoxical septal motion is noted.      2. The RV is moderately dilated. The global systolic function is normal. The RVEF is 71%.  A pacemaker lead  is noted.     3. There is severe right atrial enlargement.      4. There is severe tricuspid regurgitation (regurgitant volume of 40 ml, corresponding to a regurgitant  fraction of 55%). The jet appears to originate in close proximity to the pacemaker lead attachment to the  septal leaflet.      5. There is a small area of midwall late gadolinium enhancement involving the mid inferolateral wall. This  is a non-specific pattern.     6.  The patient is status post ascending aortic dissection repair. A dissection flap is noted within the  descending thoracic aorta.      CONCLUSIONS:      1) Moderate right ventricular dilatation with normal systolic function.     2) There is severe tricuspid regurgitation (regurgitant volume of 40 ml, corresponding to a regurgitant  fraction of 55%). The jet appears to originate in close proximity to the pacemaker lead attachment to the  septal leaflet.      3)  The patient is status post ascending aortic dissection repair. A dissection flap is noted within the  descending thoracic aorta.     CTA C/A/P 2/2023                                                                  IMPRESSION:  1. 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 The dissection flap  extends from the distal aspect  of the repair to the left common iliac artery, unchanged.  2. Compression fractures of T12 and L1, unchanged.  3. Diverticulosis without evidence of diverticulitis.  4. Decreased perfusion of the left kidney, similar to the prior exam.  5. Cholelithiasis without evidence of cholecystitis.     LILIANA JARAMILLO, DO          The patient's records and results personally reviewed by this provider.     Outside records reviewed from: Care Everywhere      Assessment  Priya Funez is a 82 year old female seen as a PAC referral for risk assessment and optimization for anesthesia.    Plan/Recommendations  Pt will be optimized for the proposed procedure.  See below for details on the assessment, risk, and preoperative recommendations    NEUROLOGY  - No history of TIA, CVA or seizure    -Post Op delirium risk factors:  Age    ENT  - Known difficult intubation - needed glide scope in the past   H/o previous tracheostomy in 2019.  Mallampati: Unable to assess  TM: Unable to assess    CARDIAC  - Severe TR   ~ above procedure scheduled   ~ preoperative labs ordered to be drawn DOS by Dr. Car.    - H/o emergent aortic dissection repair 1/4/19    - Permanent atrial fibrillation s/p AVN ablation with PPM (2019)   ~ warfarin>> perioperative anticoagulation plan outlined by her anticoagulation clinic is to hold warfarin for 48 hours with no bridge. Last dose of warfarin will be on 7/28.      ~   CHADSVASC=++age, +gender, +HTN  4    -  HFpEF   ~ Continue metoprolol DOS   ~ Connected with Dr. Car re: diuretics and ASA.  He would like the patient to hold both diuretics (spironolactone and torsemide) and aspirin the morning of surgery.   Notified patient and instructed on hold.   ~ patient is prescribed Jardiance but reports she has resumed it since her last procedure. Cardiac team to provide instructs post-op.     - HTN   ~ continue metoprolol   ~ hold diuretics DOS     - METS (Metabolic  "Equivalents) ~3   Patient CANNOT perform 4 METS exercise without symptoms            Total Score: 1    Functional Capacity: Unable to complete 4 METS      PULMONARY  - MANNY Medium Risk            Total Score: 3    MANNY: Often tired    MANNY: Hypertension    MANNY: Over 50 ys old      - Denies asthma or inhaler use  - Tobacco History    History   Smoking Status     Never   Smokeless Tobacco     Never       GI  - PONV High Risk  Total Score: 3           1 AN PONV: Pt is Female    1 AN PONV: Patient is not a current smoker    1 AN PONV: Intended Post Op Opioids        /RENAL  - Baseline Creatinine  See above     ENDOCRINE    - BMI: Estimated body mass index is 27.06 kg/m  as calculated from the following:    Height as of 6/16/23: 1.613 m (5' 3.5\").    Weight as of 7/5/23: 70.4 kg (155 lb 3.2 oz).  Overweight (BMI 25.0-29.9)  - No history of Diabetes Mellitus    HEME  VTE Low Risk 0.26%            Total Score: 1    VTE: Greater than 59 yrs old      - Platelet dysfunction second to Aspirin (Felicia, many others)    - H/o blood transfusion without reaction in 2019 in setting o  emergent aortic dissection repair    ILEANA  - Reports she uses a walker to get around as she can \"go faster\"    ~ consider fall precautions    PSYCH  - NEDA   ~ escitalpram    - Insomnia   ~ mirtazapine    Different anesthesia methods/types have been discussed with the patient, but they are aware that the final plan will be decided by the assigned anesthesia provider on the date of service.  Patient was discussed with Dr Horvath    The patient is optimized for their procedure. AVS with information on surgery time/arrival time, meds and NPO status given by nursing staff. No further diagnostic testing indicated.    Please refer to the physical examination documented by the anesthesiologist in the anesthesia record on the day of surgery.    Video-Visit Details    Type of service:  Video Visit    Provider received verbal consent for a Video Visit from the patient? " Yes   Video Start Time: 13:30  Video End Time:13:46    Originating Location (pt. Location): Home  Distant Location (provider location):  Off-site  Mode of Communication:  Video Conference via [employer]  On the day of service:     Prep time: 20 minutes  Visit time: 16 minutes  Documentation time: 25 minutes  Coordinating care: 5 minutes  ------------------------------------------  Total time: 66 minutes      TAMRA Muse CNP  Preoperative Assessment Center  [facility]  Clinic and Surgery Center  Phone: [phone]  Fax: [phone]

## 2023-07-31 PROBLEM — T82.120A ATRIAL PACEMAKER LEAD DISPLACEMENT: Status: ACTIVE | Noted: 2023-01-01

## 2023-07-31 PROBLEM — I44.2 COMPLETE HEART BLOCK (H): Status: ACTIVE | Noted: 2023-01-01

## 2023-07-31 PROBLEM — Z95.0 CARDIAC PACEMAKER IN SITU: Status: ACTIVE | Noted: 2023-01-01

## 2023-07-31 PROBLEM — T82.120A: Status: ACTIVE | Noted: 2023-01-01

## 2023-07-31 NOTE — ANESTHESIA PREPROCEDURE EVALUATION
Pre-Operative H & P     CC:  Preoperative exam to assess for increased cardiopulmonary risk while undergoing surgery and anesthesia.    Date of Encounter: 7/28/2023  Primary Care Physician:  Sharmaine Burks     Reason for visit:   Encounter Diagnoses   Name Primary?    Cardiac pacemaker in situ     Severe tricuspid regurgitation     Atrial pacemaker lead displacement     Complete heart block (H)     Displacement of atrial pacemaker leads, initial encounter        HPI  Priya Funez is a 82 year old female who presents for pre-operative H & P in preparation for  Procedure Information       Case: [id number] Date/Time: 07/31/23 0800    Procedures:       Pacemaker Leadless Device Implant (Heart)      EXTRACTION, ELECTRODE LEAD, MYOCARDIAL, USING LASER (Heart)    Anesthesia type: MAC    Diagnosis:       Cardiac pacemaker in situ [Z95.0]      Severe tricuspid regurgitation [I07.1]      Atrial pacemaker lead displacement [T82.120A]      Complete heart block (H) [I44.2]    Pre-op diagnosis:       Cardiac pacemaker in situ [Z95.0]      Severe tricuspid regurgitation [I07.1]      Atrial pacemaker lead displacement [T82.120A]      Complete heart block (H) [I44.2]    Location:  OR [address] [place] [address] OR    Providers: Gaston Car MD            Priya Funez is an 82-year-old female who was seen by Dr. Car on 7/5/2023 in the  Electrophysiology Clinic as a new patient EP consultation for device management.  The patient has a h/o HFpEF, severe TR, permanent AF s/p AVN ablation with PPM implant 1/11/19 and emergent aortic dissection repair 1/4/19.  The patient has been being followed for her tricuspid regurgitation and more recently, she has not has as much stamina with mildly increasing dyspnea on exertion.The patient's ALEX this past June showed massive TR with RV pacemaker lead restricting coaptation of septal and anterior leaflets.She was then referred to Dr. Upton for consideration of extraction of pacemaker lead.  Dr. Car  counseled the patient on the findings and treatment options.  The patient has elected to proceed with the above surgical intervention.      The patient presents to the PAC virtually today with her daughter, Susie, in preparation for the above scheduled procedure with comorbid conditions as above and HTN.      History is obtained from the patient and chart review    Hx of abnormal bleeding or anti-platelet use: warfarin and ASA     Menstrual history: No LMP recorded. Patient is postmenopausal.:      Past Medical History  Past Medical History:   Diagnosis Date    Antiplatelet or antithrombotic long-term use     Benign essential hypertension     Cardiac pacemaker in situ     Medtronic    Chronic atrial fibrillation (H)     s/p AV node ablation and PPM placement January, 2019    Chronic diastolic heart failure (H)     Chronic kidney disease, stage III (moderate) (H)     Chronic right-sided heart failure (H)     Congestive heart failure (H)     NEDA (generalized anxiety disorder)     History of aortic dissection     s/p emergent repair 2019    History of blood transfusion     no adverse reactions    Hypertension     Mild mitral regurgitation     Obesity     Severe tricuspid regurgitation        Past Surgical History  Past Surgical History:   Procedure Laterality Date    ANGIOGRAM Right 01/04/2019    Procedure: DIAGONSTIC ANGIOGRAM OF SMA;  Surgeon: Rigo Jennings MD;  Location:  OR    BIOPSY BREAST      BYPASS GRAFT ARTERY CORONARY, REPAIR VALVE AORTIC, COMBINED N/A 01/04/2019    Procedure: AORTIC DISSECTION REPAIR WITH GRAFT- HEMASHIELD PLATINUM WOVEN DOUBLE VELOR 4 SIDE ARM VASCULAR GRAFT, D:35 X 12 X 10 X 10MM/ L:50CM;  Surgeon: Jose Winslow MD;  Location:  OR    EP ABLATION AV NODE N/A 01/11/2019    Procedure: EP Ablation AV Node;  Surgeon: Tsering Davey MD;  Location:  HEART CARDIAC CATH LAB    EP PACEMAKER Left 01/11/2019    Procedure: EP Pacemaker;  Surgeon: Tsering Davey MD;  Location:   HEART CARDIAC CATH LAB    THYROID SURGERY      benign mass removed    TRACHEOSTOMY N/A 01/25/2019    Procedure: TRACHEOSTOMY  (DR MCCLELLAND);  Surgeon: Bryson Mcclelland MD;  Location: [facility] OR    TRANSESOPHAGEAL ECHOCARDIOGRAM INTRAOPERATIVE N/A 6/16/2023    Procedure: ECHOCARDIOGRAM,TRANSESOPHAGEAL,WITH ANESTHESIA;  Surgeon: Dennis Meier DO;  Location: [facility] OR    TUBAL LIGATION         Prior to Admission Medications  No current outpatient medications on file.       Allergies  Allergies   Allergen Reactions    Amoxicillin Swelling     Face and body    Ciprofloxacin Hives       Social History  Social History     Socioeconomic History    Marital status:      Spouse name: Not on file    Number of children: Not on file    Years of education: Not on file    Highest education level: Not on file   Occupational History    Occupation: Retired - customer service   Tobacco Use    Smoking status: Never    Smokeless tobacco: Never   Vaping Use    Vaping Use: Never used   Substance and Sexual Activity    Alcohol use: Not Currently    Drug use: No    Sexual activity: Not on file   Other Topics Concern    Parent/sibling w/ CABG, MI or angioplasty before 65F 55M? Not Asked   Social History Narrative    .    Lives in home with . Fully independent.    4 adult children.    8 grandchildren.    Walks 2-3x/week.     Social Determinants of Health     Financial Resource Strain: Not on file   Food Insecurity: Not on file   Transportation Needs: Not on file   Physical Activity: Not on file   Stress: Not on file   Social Connections: Not on file   Intimate Partner Violence: Not on file   Housing Stability: Not on file       Family History  Family History   Problem Relation Age of Onset    Ataxia Mother     Heart Disease Father     Diabetes No family hx of     Myocardial Infarction No family hx of     Cerebrovascular Disease No family hx of     Coronary Artery Disease Early Onset No family hx of      Breast Cancer No family hx of     Colon Cancer No family hx of     Ovarian Cancer No family hx of        Review of Systems  The complete review of systems is negative other than noted in the HPI or here.   Anesthesia Evaluation   Pt has had prior anesthetic. Type: General and MAC.    History of anesthetic complications  -  and difficult airway.  Glidescope used in the past. .    ROS/MED HX  ENT/Pulmonary: Comment: S/p tracheostomy and reversal - neg pulmonary ROS  (-) tobacco use, asthma and COPD   Neurologic:  - neg neurologic ROS  (-) no CVA and no TIA   Cardiovascular: Comment: H/o Emergent aortic dissection repair in 2019 with a prolonged hospitalization with difficult vent weaning requiring tracheostomy.    (+)  hypertension- Peripheral Vascular Disease (Type A dissection s/p repair)-- Other.  CAD -  CABG- -   Taking blood thinners   CHF etiology: HFpEF - non ischemic       pacemaker, Reason placed: complete heart block.   - Patient is dependent on pacemaker.      dysrhythmias (s/p AVN ablation ), a-fib,  valvular problems/murmurs  Severe TR, s/p SAVR.    Previous cardiac testing  (-) dyslipidemia   METS/Exercise Tolerance: 3 - Able to walk 1-2 blocks without stopping Comment: Goes outside and potter flowers.     Hematologic:     (+)       history of blood transfusion, no previous transfusion reaction, Known PRBC Anitbodies:No    (-) history of blood clots   Musculoskeletal: Comment: Right foot Bunionectomy   (+)  arthritis,             GI/Hepatic:  - neg GI/hepatic ROS  (-) GERD and liver disease   Renal/Genitourinary:     (+) renal disease, type: CRI,            Endo: Comment: Lab Results       Component                Value               Date                       A1C                      5.6                 01/30/2019                 A1C                      5.3                 01/04/2019               (-) Type II DM, thyroid disease and obesity   Psychiatric/Substance Use:     (+) psychiatric history  anxiety    (-) alcohol abuse history and chronic opioid use history   Infectious Disease:  - neg infectious disease ROS     Malignancy:  - neg malignancy ROS     Other:      (+)  , ,, other significant disability Other (comment) (Uses a walker. )  (-) Any chance pregnant       Virtual visit -  No vitals were obtained    Physical Exam  Constitutional: Awake, alert, cooperative, no apparent distress, and appears stated age.  Eyes: Pupils equal  HENT: Normocephalic  Respiratory: non labored breathing   Neurologic: Awake, alert, oriented to name, place and time.   Neuropsychiatric: Calm, cooperative. Normal affect.      Prior Labs/Diagnostic Studies   All labs and imaging personally reviewed    Latest Reference Range & Units 06/16/23 10:51   Sodium 136 - 145 mmol/L 139   Potassium 3.4 - 5.3 mmol/L 3.5   Chloride 98 - 107 mmol/L 97 (L)   Carbon Dioxide (CO2) 22 - 29 mmol/L 31 (H)   Urea Nitrogen 8.0 - 23.0 mg/dL 20.5   Creatinine 0.51 - 0.95 mg/dL 1.18 (H)   GFR Estimate >60 mL/min/1.73m2 46 (L)   Calcium 8.8 - 10.2 mg/dL 9.5   Anion Gap 7 - 15 mmol/L 11   Albumin 3.5 - 5.2 g/dL 3.9   Protein Total 6.4 - 8.3 g/dL 6.8   Alkaline Phosphatase 35 - 104 U/L 97   ALT 0 - 50 U/L 12   AST 0 - 45 U/L 22   Bilirubin Total <=1.2 mg/dL 1.5 (H)   Glucose 70 - 99 mg/dL 83   (L): Data is abnormally low  (H): Data is abnormally high    PROCEDURES  Cardiac Device Check 7/5/2023  Narrative & Impression    Patient seen in clinic for evaluation and iterative programming of their pacemaker per MD orders.     Device: Medtronic [id number] [place] XT SR MRI  Normal device function  Mode: VVIR  bpm  : 100%  Intrinsic Rhythm:  30 bpm  Short V-V intervals: 0  Lead Trends Appear Stable: Yes  Estimated battery longevity to RRT = 8.8 years./Battery voltage = 2.93 V.   Atrial Arrhythmia: None  AF [place] = 0%  Anticoagulant: Warfarin  Ventricular Arrhythmia: None  Setting Changes: None  Patient has an appointment to see Dr. Car today.      Plan:  Resume device f/u in [place] as previously scheduled.   BALJINDER Jaramillo RN          EKG 6/8/2023  Atrial flutter with ventricular pacing   Abnormal ECG   When compared with ECG of 04-APR-2019 17:21,   No significant change was found   Confirmed by TRESSA YAÑEZ MD LOC:JN ([id number]) on 6/8/2023 4:11:32 PM       ALEX 6/16/23   Interpretation Summary     Mid and deep esophageal imaging of tricuspid valve was challenging given  intracardiac calcification and rotation of heart within chest cavity. Adequate  transgastric views obtained.     1. The left ventricle is normal in size. Left ventricular function is normal.  The ejection fraction is 55-60%.  2. The right ventricle is mildly dilated. Mildly decreased right ventricular  systolic function  3. 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 Left to right atrial shunt, small A patent  foramen ovale is present. No thrombus is detected in the left atrial  appendage.  4. Massive tricuspid regurgitation. The RV pacemaker lead is restricting  coaptation of the septal leaf with anterior leaflet (noted on transgastric  short axis, image 119). Coaptation gap is 1.4 cm. Septal leaflet appears  mobile and non-calcified.    CMR 5/2/23    SUMMARY   Clinical history: Severe tricuspid regurgitation, status post aortic dissection repair 2019.   Comparison CMR: None     1. The LV is normal in cavity size and wall thickness. The global systolic function is normal. The LVEF is  72%. Paradoxical septal motion is noted.      2. The RV is moderately dilated. The global systolic function is normal. The RVEF is 71%.  A pacemaker lead  is noted.     3. There is severe right atrial enlargement.      4. There is severe tricuspid regurgitation (regurgitant volume of 40 ml, corresponding to a regurgitant  fraction of 55%). The jet appears to originate in close proximity to the pacemaker lead attachment to the  septal leaflet.      5. There is a small area of midwall late gadolinium enhancement involving the mid  inferolateral wall. This  is a non-specific pattern.     6.  The patient is status post ascending aortic dissection repair. A dissection flap is noted within the  descending thoracic aorta.      CONCLUSIONS:      1) Moderate right ventricular dilatation with normal systolic function.     2) There is severe tricuspid regurgitation (regurgitant volume of 40 ml, corresponding to a regurgitant  fraction of 55%). The jet appears to originate in close proximity to the pacemaker lead attachment to the  septal leaflet.      3)  The patient is status post ascending aortic dissection repair. A dissection flap is noted within the  descending thoracic aorta.     CTA C/A/P 2/2023                                                                  IMPRESSION:  1. 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 The dissection flap extends from the distal aspect  of the repair to the left common iliac artery, unchanged.  2. Compression fractures of T12 and L1, unchanged.  3. Diverticulosis without evidence of diverticulitis.  4. Decreased perfusion of the left kidney, similar to the prior exam.  5. Cholelithiasis without evidence of cholecystitis.     LILIANA JARAMILLO, DO          The patient's records and results personally reviewed by this provider.     Outside records reviewed from: Care Everywhere      Assessment  Priya Funez is a 82 year old female seen as a PAC referral for risk assessment and optimization for anesthesia.    Plan/Recommendations  Pt will be optimized for the proposed procedure.  See below for details on the assessment, risk, and preoperative recommendations    NEUROLOGY  - No history of TIA, CVA or seizure    -Post Op delirium risk factors:  Age    ENT  - Known difficult intubation - needed glide scope in the past   H/o previous tracheostomy in 2019.  Mallampati: Unable to assess  TM: Unable to assess    CARDIAC  - Severe TR   ~ above procedure scheduled   ~  "preoperative labs ordered to be drawn DOS by Dr. Car.    - H/o emergent aortic dissection repair 1/4/19    - Permanent atrial fibrillation s/p AVN ablation with PPM (2019)   ~ warfarin>> perioperative anticoagulation plan outlined by her anticoagulation clinic is to hold warfarin for 48 hours with no bridge. Last dose of warfarin will be on 7/28.      ~   CHADSVASC=++age, +gender, +HTN  4    -  HFpEF   ~ Continue metoprolol DOS   ~ Connected with Dr. Car re: diuretics and ASA.  He would like the patient to hold both diuretics (spironolactone and torsemide) and aspirin the morning of surgery.   Notified patient and instructed on hold.   ~ patient is prescribed Jardiance but reports she has resumed it since her last procedure. Cardiac team to provide instructs post-op.     - HTN   ~ continue metoprolol   ~ hold diuretics DOS     - METS (Metabolic Equivalents) ~3   Patient CANNOT perform 4 METS exercise without symptoms            Total Score: 1    Functional Capacity: Unable to complete 4 METS      PULMONARY  - MANNY Medium Risk            Total Score: 3    MANNY: Often tired    MANNY: Hypertension    MANNY: Over 50 ys old      - Denies asthma or inhaler use  - Tobacco History    History   Smoking Status    Never   Smokeless Tobacco    Never       GI  - PONV High Risk  Total Score: 3           1 AN PONV: Pt is Female    1 AN PONV: Patient is not a current smoker    1 AN PONV: Intended Post Op Opioids        /RENAL  - Baseline Creatinine  See above     ENDOCRINE    - BMI: Estimated body mass index is 27.61 kg/m  as calculated from the following:    Height as of this encounter: 1.6 m (5' 3\").    Weight as of this encounter: 70.7 kg (155 lb 13.8 oz).  Overweight (BMI 25.0-29.9)  - No history of Diabetes Mellitus    HEME  VTE Low Risk 0.26%            Total Score: 1    VTE: Greater than 59 yrs old      - Platelet dysfunction second to Aspirin (Felicia, many others)    - H/o blood transfusion without reaction in 2019 in " "setting o  emergent aortic dissection repair    MSKL  - Reports she uses a walker to get around as she can \"go faster\"    ~ consider fall precautions    PSYCH  - NEDA   ~ escitalpram    - Insomnia   ~ mirtazapine    Different anesthesia methods/types have been discussed with the patient, but they are aware that the final plan will be decided by the assigned anesthesia provider on the date of service.  Patient was discussed with Dr Horvath    The patient is optimized for their procedure. AVS with information on surgery time/arrival time, meds and NPO status given by nursing staff. No further diagnostic testing indicated.    Please refer to the physical examination documented by the anesthesiologist in the anesthesia record on the day of surgery.    Video-Visit Details    Type of service:  Video Visit    Provider received verbal consent for a Video Visit from the patient? Yes   Video Start Time: 13:30  Video End Time:13:46    Originating Location (pt. Location): Home  Distant Location (provider location):  Off-site  Mode of Communication:  Video Conference via [employer]  On the day of service:     Prep time: 20 minutes  Visit time: 16 minutes  Documentation time: 25 minutes  Coordinating care: 5 minutes  ------------------------------------------  Total time: 66 minutes      TAMRA Muse CNP  Preoperative Assessment Center  [facility]  Phone: [phone]  Fax: [phone]  Physical Exam    Airway        Mallampati: II   TM distance: > 3 FB   Neck ROM: full   Mouth opening: > 3 cm    Respiratory Devices and Support         Dental       (+) Minor Abnormalities - some fillings, tiny chips      Cardiovascular          Rhythm and rate: regular and normal     Pulmonary   pulmonary exam normal        breath sounds clear to auscultation           Anesthesia Plan    ASA Status:  3    NPO Status:  NPO Appropriate    Anesthesia Type: General.     - Airway: ETT   Induction: " Intravenous.   Maintenance: Balanced.   Techniques and Equipment:     - Lines/Monitors: BIS, 2nd IV, Arterial Line, ALEX            ALEX Absolute Contra-indication: NONE            ALEX Relative Contra-indication: NONE     - Drips/Meds: Epinephrine, Phenylephrine     Consents    Anesthesia Plan(s) and associated risks, benefits, and realistic alternatives discussed. Questions answered and patient/representative(s) expressed understanding.     - Discussed:     - Discussed with:  Patient       - Patient is DNR/DNI Status: No     Use of blood products discussed: Yes.     - Discussed with: Patient.     - Consented: consented to blood products            Reason for refusal: other.     Postoperative Care    Pain management: Multi-modal analgesia.   PONV prophylaxis: Ondansetron (or other 5HT-3), Dexamethasone or Solumedrol     Comments:

## 2023-07-31 NOTE — PHARMACY-ANTICOAGULATION SERVICE
Clinical Pharmacy - Warfarin Dosing Consult     Pharmacy has been consulted to manage this patient s warfarin therapy.  Indication: Atrial Fibrillation  Therapy Goal: INR 2-3  OP Anticoag Clinic: [facility]  Warfarin Prior to Admission: Yes  Warfarin PTA Regimen: 4 mg every (Sun, Thu); 2 mg all other days  Significant drug interactions: Escitalopram  Recent documented change in oral intake/nutrition: No    INR   Date Value Ref Range Status   07/31/2023 1.38 (H) 0.85 - 1.15 Final   07/10/2023 2.0 (H) 0.9 - 1.1 Final       Recommend warfarin 4 mg today.  Pharmacy will monitor Priya Funez daily and order warfarin doses to achieve specified goal.      Please contact pharmacy as soon as possible if the warfarin needs to be held for a procedure or if the warfarin goals change.

## 2023-07-31 NOTE — ANESTHESIA PROCEDURE NOTES
Perioperative ALEX Procedure Note    Staff -        Anesthesiologist:  William Car MD       Performed By: anesthesiologist  Preanesthesia Checklist:  Patient identified, IV assessed, risks and benefits discussed, monitors and equipment assessed, procedure being performed at surgeon's request and anesthesia consent obtained.    ALEX Probe Insertion    Probe Status PRE Insertion: NO obvious damage  Probe type:  Adult 3D  Bite block used:   Oral Airway  Insertion Technique: Easy, no oropharyngeal manipulation  Insertion complications: None obvious  Billing Report:ALEX report by Anesthesiologist (See Separate Report note)  Probe Status POST Removal: NO obvious damage    ALEX Report  General Procedure Information  Images for this study have been archived.  Modalities: 2D, 3D, CW Doppler, PW Doppler and Color flow mapping  Diagnostic Indications comments: Severe TR, intra-op monitoring.  Echocardiographic and Doppler Measurements  Right Ventricle:  Cavity size dilated.    Hypertrophy not present.   Thrombus not present.    Global function moderately impaired.     Left Ventricle:  Cavity size normal.    Hypertrophy present.   Thrombus not present.   Global Function moderately impaired.   Ejection Fraction 45%.      Ventricular Regional Function:  1- Basal Anteroseptal:  hypokinetic  2- Basal Anterior:  hypokinetic  3- Basal Anterolateral:  hypokinetic  4- Basal Inferolateral:  hypokinetic  5- Basal Inferior:  hypokinetic  6- Basal Inferoseptal:  hypokinetic  7- Mid Anteroseptal:  hypokinetic  8- Mid Anterior:  hypokinetic  9- Mid Anterolateral:  hypokinetic  10- Mid Inferolateral:  hypokinetic  11- Mid Inferior:  hypokinetic  12- Mid Inferoseptal:  hypokinetic  13- Apical Anterior:  hypokinetic  14- Apical Lateral:  hypokinetic  15- Apical Inferior:  hypokinetic  16- Apical Septal:  hypokinetic  17- [place]:  hypokinetic    Valves  Aortic Valve: Annulus bioprosthetic.  Stenosis mild.  Area: 1.37 cm .  Regurgitation absent.   Leaflets normal.  Leaflet motions normal.    Mitral Valve: Annulus normal.  Stenosis not present.  Regurgitation +1.  Leaflets normal.  Leaflet motions normal.    Tricuspid Valve: Annulus dilated.  Stenosis not present.  Regurgitation +4.  Leaflets normal.  Leaflet motions normal.    Pulmonic Valve: Annulus normal.  Stenosis not present.  Regurgitation absent.    Other Valve Findings:  Severe TR w/ HVF reversal  Aorta: Ascending Aorta: Size normal.  Dissection not present.  Plaque thickness less than 3 mm.  Mobile plaque not present.    Aortic Arch: Size normal.    Dissection present.   Plaque thickness less than 3 mm.   Mobile plaque not present.    Descending Aorta: Size normal.    Dissection present.   Plaque thickness less than 3 mm.   Mobile plaque not present.    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   Spontaneous echo contrast present.   Thrombus not present.   Tumor not present.   Device present.   Left Atrium: Size dilated.  Spontaneous echo contrast present.  Thrombus not present.  Tumor not present.  Device not present.    Left atrial appendage normal.   Other Atria Findings:  CHERYL velocity < 40 cm/s  Atrial Septum: Intra-atrial septal morphology contains patent foramen ovale.   Patent foramen ovale shunts right to left.      Ventricular Septum: Intra-ventricular septum morphology normal.    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  E=  ms.  A=  ms.  E/A Ratio= .  DT=  ms.  S/D= .  IVRT= ms.  Other Findings:   Pericardium:  normal. Pleural Effusion:  none. Pulmonary Arteries:  normal. Pulmonary Venous Flow:  blunted (decreased) systolic flow. No coronary sinus catheter present.    Post Intervention Findings  Procedure(s) performed:  Other (see comments) (lead extraction + Leadless PPM placement). Global function:  Unchanged. Regional wall  motion: Unchanged. Surgeon(s) notified of all postintervention findings: Yes.                 Echocardiogram Comments  Echocardiogram comments:   PRE:  Moderately reduced LV systolic function w/ LVEF 45%. Mildly reduced RV systolic function but severely dilated chamber. Likely Grade III DD. Mild MR. Mild AS. Severe torrential TR w/ HVF reversal. Aneurysmal IAS w/ PFO by CFD. No pericardial effusion. Chronic dissection visualized in the descending aorta which was previously known. All findings communicated with surgical team.    POST:  S/p PPM lead extraction and leadless PPM placement. Global biventricular function unchanged. No new RWMAs. TR unchanged and still torrential. PPM leads no longer in the RV. New leadless PPM visualized in the RV apex. Trace pericardial effusion is new but stable without chamber collapse. Aortic dissection unchanged. All findings communicated with surgical team. .

## 2023-07-31 NOTE — DISCHARGE SUMMARY
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 Funez is an 82 year old female who has a medical history significant for HFpEF, severe TR, permanent AF s/p AVN ablation with PPM implant 1/11/19, emergent aortic dissection repair 1/4/19, HTN, and obesity.   She underwent an emergent aortic dissection repair in 2019 with a prolonged hospitalization with difficult vent weaning requiring tracheostomy. She also has AF which became permanent and she had AVN ablation and PPM implanted in 2019. She has HFpEF. She has developed severe TR. She had a ALEX to identify mechanism of TR which showed massive TR with RV pacemaker lead restricting coaptation of septal and anterior leaflets. Thus, she was referred for consideration for extraction of pacemaker lead. She reports feeling ALEXANDER/SOB.  Patient underwent a successful pacemaker lead extraction, device removal, and implant of leadless pacemaker on 7/31/23. She had increased O2 needs temporarily following the procedure, requirement now returned to baseline. Device interrogation shows normal device function and stable lead parameters. Chest x-ray demonstrates no evidence of pneumothorax. Groin sites are CDI, no bleeding, and no hematoma. Patient is tolerating oral intake and ambulating at baseline. Patient remains hemodynamically stable.     ROS:   10 point ROS neg other than the symptoms noted above.     Exam:  BP 98/56   Pulse 68   Temp 97.6  F (36.4  C) (Oral)   Resp 25   Ht 1.6 m (5' 3\")   Wt 70.7 kg (155 lb 13.8 oz)   SpO2 99%   BMI 27.61 kg/m      Constitutional: healthy, alert, " and no distress  Head: Normocephalic. No masses, lesions, tenderness or abnormalities  Cardiovascular: Regular rate and rhythm, No M/R/G  Respiratory: CTA, no accessory muscle use  Gastrointestinal: Abdomen soft, non-tender.   Musculoskeletal: extremities normal- no gross deformities noted  Skin: Incision site on left upper chest CDI, scant blood. Groin site well healed.   Neurologic: Gait normal. Sensation grossly WNL.  Psychiatric: mentation appears normal    Discharge Medications:     Review of your medicines        CONTINUE these medicines which may have CHANGED, or have new prescriptions. If we are uncertain of the size of tablets/capsules you have at home, strength may be listed as something that might have changed.        Dose / Directions   warfarin ANTICOAGULANT 2 MG tablet  Commonly known as: COUMADIN  This may have changed: additional instructions  Used for: Chronic atrial fibrillation (H)      Take as directed. If you are unsure how to take this medication, talk to your nurse or doctor.  Original instructions: Take one tablet (2 mg) by mouth on Fridays; take two tablets (4 mg) all other days; or as instructed by ACC team.  Quantity: 170 tablet  Refills: 1            CONTINUE these medicines which have NOT CHANGED        Dose / Directions   Acetaminophen 325 MG Caps      Dose: 325-650 mg  Take 325-650 mg by mouth every 4 hours as needed  Refills: 0     albuterol 108 (90 Base) MCG/ACT inhaler  Commonly known as: PROAIR HFA/PROVENTIL HFA/VENTOLIN HFA  Used for: Cough      Dose: 2 puff  Inhale 2 puffs into the lungs every 6 hours as needed for shortness of breath / dyspnea or wheezing  Quantity: 18 g  Refills: 0     aspirin 81 MG EC tablet  Commonly known as: ASA      Dose: 81 mg  Take 81 mg by mouth every 24 hours  Refills: 0     cyanocobalamin 100 MCG tablet  Commonly known as: VITAMIN B-12      Dose: 1,000 mcg  Take 1,000 mcg by mouth daily  Refills: 0     empagliflozin 10 MG Tabs tablet  Commonly known  as: Jardiance  Used for: Chronic diastolic congestive heart failure (H)      Dose: 10 mg  Take 1 tablet (10 mg) by mouth daily  Quantity: 90 tablet  Refills: 3     escitalopram 10 MG tablet  Commonly known as: LEXAPRO  Used for: NEDA (generalized anxiety disorder)      Dose: 10 mg  Take 1 tablet (10 mg) by mouth daily  Quantity: 90 tablet  Refills: 3     metoprolol tartrate 25 MG tablet  Commonly known as: LOPRESSOR  Used for: Chronic diastolic congestive heart failure (H), Benign essential hypertension, Persistent atrial fibrillation (H)      Dose: 25 mg  Take 1 tablet (25 mg) by mouth 2 times daily  Quantity: 180 tablet  Refills: 3     mirtazapine 15 MG tablet  Commonly known as: REMERON  Used for: Trouble in sleeping      Dose: 15 mg  Take 1 tablet (15 mg) by mouth At Bedtime  Quantity: 90 tablet  Refills: 3     spironolactone 25 MG tablet  Commonly known as: ALDACTONE  Used for: Chronic diastolic congestive heart failure (H), Chronic right-sided heart failure (H)      Dose: 25 mg  Take 1 tablet (25 mg) by mouth daily  Quantity: 90 tablet  Refills: 3     torsemide 10 MG tablet  Commonly known as: DEMADEX  Used for: Chronic diastolic congestive heart failure (H)      Take 4 tablets(40mg) by mouth every day  Quantity: 360 tablet  Refills: 3     Vitamin D3 25 mcg (1000 units) tablet  Commonly known as: CHOLECALCIFEROL      Dose: 1,000 Units  Take 1,000 Units by mouth daily  Refills: 0          Patient Instructions:    Care of groin site:        Remove the Band-Aid after 24 hours. If there is minor oozing, apply another Band-aid and remove it after 12 hours.         Do NOT take a bath, use a hot tub, pool, or submerse in water for at least 3 days You may shower.         It is normal to have a small bruise or lump at the site.        Do not scrub the site.        Do not use lotion or powder near the puncture site for 3 days.        For the first 2 days: Do not stoop or squat. When you cough, sneeze or move your bowels,  hold your hand over the puncture site and press gently.        Do not lift more than 10 pounds or exertional activity for 10 days.      If you start bleeding from the site in your groin:  Lie down flat and press firmly on the site.  Call your physician immediately, or, come to the emergency room.    Call 911 right away if you have bleeding that is heavy or does not stop.     Call your doctor/provider if:        You have a large or growing hard lump around the site.        The site is red, swollen, hot or tender.        Blood or fluid is draining from the site.        You have chills or a fever greater than 101 F (38 C).        Your leg or arm turns bluish, feels numb or cool.        You have hives, a rash or unusual itching.     Plan & Follow up:  Follow up with device clinic in 7-10 days  No lifting > 10lbs for 10 days.   Notify device clinic/EP immediately for any redness, swelling, bleeding, discharge, fevers or chills.  Continue home medications without changes  Follow up with Cardiology for ongoing assessment of TV   Discharge to home.       Beth Fowler PA-C  [facility]  Electrophysiology Consult Service  Pager: [phone]    EP STAFF NOTE  I have seen and examined the patient as part of a shared visit with CHASTITY Knox.  I agree with the note above. I reviewed today's vital signs and medications. I have reviewed and discussed with the advanced practice provider their physical examination, assessment, and plan   Briefly, s/p AVN ablation and PPM  My key history/exam findings are: doing well.   The key management decisions made by me: follow-up based on results.  Total time spent on patient visit, reviewing notes, imaging, labs, orders, and completing necessary documentation: 45 minutes.  >50% of visit spent on counseling patient and/or coordination of care.     Gaston Car MD [facility]  Cardiology - Electrophysiology

## 2023-07-31 NOTE — ANESTHESIA POSTPROCEDURE EVALUATION
Patient: Priya Funez    Procedure: Procedure(s):  Pacemaker Leadless Device Implant  EXTRACTION, ELECTRODE LEAD, MYOCARDIAL, USING LASER       Anesthesia Type:  General    Note:  Disposition: Admission   Postop Pain Control: Uneventful            Sign Out: Well controlled pain   PONV: No   Neuro/Psych: Uneventful            Sign Out: Acceptable/Baseline neuro status   Airway/Respiratory: Uneventful            Sign Out: Acceptable/Baseline resp. status   CV/Hemodynamics: Uneventful            Sign Out: Acceptable CV status; No obvious hypovolemia; No obvious fluid overload   Other NRE: NONE   DID A NON-ROUTINE EVENT OCCUR? No           Last vitals:  Vitals Value Taken Time   BP 94/59 07/31/23 1430   Temp 36.4  C (97.5  F) 07/31/23 1345   Pulse 60 07/31/23 1452   Resp 24 07/31/23 1452   SpO2 96 % 07/31/23 1452   Vitals shown include unvalidated device data.    Electronically Signed By: William Car MD  July 31, 2023  2:53 PM

## 2023-07-31 NOTE — ANESTHESIA PROCEDURE NOTES
Airway       Patient location during procedure: OR       Procedure Start/Stop Times: 7/31/2023 8:20 AM  Staff -        CRNA: Pawel Mason APRN CRNA       Performed By: CRNA  Consent for Airway        Urgency: elective  Indications and Patient Condition       Indications for airway management: andriy-procedural       Induction type:intravenous       Mask difficulty assessment: 2 - vent by mask + OA or adjuvant +/- NMBA    Final Airway Details       Final airway type: endotracheal airway       Successful airway: ETT - single  Endotracheal Airway Details        ETT size (mm): 7.0 (passes easily post trach)       Cuffed: yes       Successful intubation technique: direct laryngoscopy       DL Blade Type: Lynch 2       Grade View of Cords: 1       Adjucts: stylet       Position: Right       Measured from: gums/teeth       Secured at (cm): 21       Bite Block used: ALEX bite block.    Post intubation assessment        Placement verified by: capnometry, equal breath sounds and chest rise        Number of attempts at approach: 1       Secured with: cloth tape       Ease of procedure: easy       Dentition: Intact    Medication(s) Administered   Medication Administration Time: 7/31/2023 8:20 AM

## 2023-07-31 NOTE — DISCHARGE INSTRUCTIONS
Home Care after a Leadless Pacemaker Implant        Care of groin site:        Remove the Band-Aid after 24 hours. If there is minor oozing, apply another Band-aid and remove it after 12 hours.         Do NOT take a bath, use a hot tub, pool, or submerse in water for at least 3 days. You may shower.         It is normal to have a small bruise or lump at the site.        Do not scrub the site.        Do not use lotion or powder near the puncture site for 3 days.        For the first 2 days: Do not stoop or squat. When you cough, sneeze or move your bowels, hold your hand over the puncture site and press gently.        Do not lift more than 10 pounds and avoid exertional activity for 10 days.      If you start bleeding from the site in your groin:  Lie down flat and press firmly on the site.  Call your physician immediately, or, come to the emergency room.    Call 911 right away if you have bleeding that is heavy or does not stop.     Call your doctor/provider if:        You have a large or growing hard lump around the site.        The site is red, swollen, hot or tender.        Blood or fluid is draining from the site.        You have chills or a fever greater than 101 F (38 C).        Your leg or arm turns bluish, feels numb or cool.        You have hives, a rash or unusual itching.     Pain:   You may have mild to moderate pain for 3 to 5 days.  Take acetaminophen (Tylenol) or ibuprofen (Advil) for the pain.  Call us if the pain is severe or lasts more than 5 days.    Activity:  After 24 hours, slowly return to your normal activities.   Healing will take 4 to 6 weeks.    Follow Up Visits:  Return to the clinic in 7 to 10 days to have your device checked.      Telling others about your device:  Before you have any medical tests or treatments, tell the doctors, dentists, and other care providers about your device.  There are a few tests and treatments that may interfere with your device.  (These include MRI,  radiation therapy, electrocautery, and others.)  Your care team may need to take special steps to keep you safe.  Before you leave the hospital, you will receive a temporary ID card.  A permanent card will be mailed to you about 6 to 8 weeks later.  Always carry the ID card with you.  It has important details about your device.  You should also get a MedicAlert ID.  Please ask us for a MedicAlert brochure, or go to [email or web address].    Safety near electrical equipment:  All of these are safe to use when in good repair:  MRI tests 6 weeks after implant  Microwaves  Radios  Cordless phone  Remote controls  Small electrical tools  Cell phones: Keep cell phones at least 6 inches from your device.  Do not carry the phone in a pocket near your device.  Security borrero: It is okay to walk through security borrero at the airports and department stores.  Tell airport security that you have a pacemaker.  They should keep the screening wand at least 6 inches from your device.  Full-body scanners are safe.  Avoid the following:   Arc welding, chain saws and high-powered industrial or commercial tools.   Power lines, power plants and large power generators.   Electric body fat scales.   Magnetic mattress pads or pillow.    Questions?  Please call [facility].   Device Nurse:          Business Hours:  [phone]                       After Hours:  [phone]   Choose option 4, then ask for the                                                  on-call device nurse at job code [id number].    Your next device clinic appointment is scheduled on:    Monday August 7, 2023 at 10:30 am        [facility]  [facility] [address]  [address] [place] [address]

## 2023-07-31 NOTE — PROCEDURES
EP PROCEDURE NOTE    PREOPERATIVE DIAGNOSIS  Permanent Atrial Fibrillation s/p AVN ablation with PPM  Torrential TR  Right ventricular lead impingement on tricuspid valve      POSTOPERATIVE DIAGNOSIS  S/p single chamber PPM and RV lead extraction  S/p leadless (Micra) pacemaker implant  Permanent Atrial Fibrillation s/p AVN ablation with PPM  Torrential TR    NAME OF PROCEDURE:  1.  Removal of PPM generator  2.  Laser extraction was not used   3.  Manual extraction of RV lead  4.  Arterial (and venous) access for possible cardiopulmonary bypass  5.  Temporary transvenous pacemaker   6.  Intracardiac echocardiography  7.  Leadless pacemaker implant      PROCEDURE PERFORMED IN:  OR 24    EP STAFF:  Dr. Gaston Car  EP FELLOW:  Minh Quintero  CV SURGERY STAFF:  Dr. Ramos    COMPLICATIONS:  None apparent  ESTIMATED BLOOD LOSS: 20 cc  TOTAL FLUOROSCOPY TIME:  21.4 min, DAP 76907 mGycm2    CLINICAL PROFILE:  Ms. Funez is an 82 year old female who has a medical history significant for HFpEF, severe TR, permanent AF s/p AVN ablation with PPM implant 1/11/19 (100% V-paced), emergent aortic dissection repair 1/4/19, HTN, and obesity. Having torrential TR with RV pacemaker lead restricting coaptation of septal and anterior leaflets. Discussed that removal of PPM lead may help alleviate some of the TR. Shared decision making including risk (include vascular tear, cardiac tear, clotting and occlusion of a vessel, infection, damage to other components of the implanted device, bleeding, death, and need for emergency cardiopulmonary bypass, sternotomy or thoracotomy) and benefit was to proceed with device extraction and leadless pacemaker implantation.    The patient has a single chamber MDT PPM with a right ventricular lead.    Lead extraction procedure  PROCEDURE:  Patient was brought to the operating room. Informed consent had been obtained prior to the procedure. Risks discussed included bleeding, infection, vascular tear,  cardiac perforation, complete heart block (if not already present),  emergency thoracotomy, emergency sternotomy, and death.  The patient was prepped and draped in the usual, sterile manner.  General anesthesia was administered by the Anesthesia Service. The device was programmed at VOO at 60 bpm.    Using modified Seldinger technique, the following catheters were placed:  A 4 Fr sidearm sheath in the left femoral artery placed for arterial BP recording, a 6 Fr sidearm sheath in the left femoral vein for the temporary pacemaker wire, a 6 Fr sidearm sheath in the right femoral vein for access in case of a need for emergent cardiopulmonary bypass and, and a 12 Fr sidearm sheath in the right femoral vein for intracardiac ultrasound, for an emergency occlusion balloon and later for access for the leadless pacemaker placement.    Under fluoroscopic guidance a 6 Fr. Preston catheter was advanced from the left femoral vein to the RV apex for temporary pacing.    Under fluoroscopic guidance a 10 Fr. [employer] Intracardiac Echo (ICE) probe was advanced from the right femoral vein to the high right atrium and SVC. The lead course was examined with ICE and showed no lead adhesions to the SVC, the RA or the RV.    Under fluoroscopic guidance a J tip guidewire was advanced from the right femoral vein to the right internal jugular vein to provide delivery of an emergency balloon occlusion catheter.      The device to be explanted was in a left pectoral position. The TVP capture threshold was checked for consistent capture. The pocket was opened through the previous surgical scar. The subcutaneous tissue was dissected until the generator was identified.  The generator was dissected free of the pocket. Cultures were not sent given no device infection. The lead were dissected free of scar tissue and the suture sleeves were identified.  The generator was detached from the leads. Appropriate capture from the TVP was being  checked frequently. The helices were retracted from the lead tips.We attempted to retract the helices from the leads.     The lead was fully retracted smoothly without the use of a [employer] model lead locking device laser sheathe.     The patient remained hemodynamically stable after the extraction.  TE echo after the extraction showed no pericardial effusion and unchanged severe TR.    The lead tips were not sent for culture given no history of infection.    Leadless pacemaker implantation  PROCEDURE    The J guidewire placed initially for the emergency SVC balloon was removed from the 12 Fr sheath and a long amplatz wire was advanced to the SVC and proceeded with gradual dilation of the RFV access up to the 23Fr sheath for Micra delivery. 5000 U of heparin were given.    The deflectable sheath with Micra pacemaker was inserted up to the RA and was unsheathed into the RA. This was then maneuvered to the RV mid to apical septum. An RVgram was done showing we were on the septal trabeculations, both NANY and CLARKE views. The Micra was deployed at that level. A total of three of deployment were attempted due to high pacing threshold. The TVP location and pacing threshold was being checked throughout. After the third attempt, sensing was 5.2 mV and pacing threshold was 0.88 V @ 0.24 ms. A tug test was performed demonstrating stable positioning. We flushed the deflectable sheath and cut the suture to release the device. The device was retested showing stable pacing and sensing parameters. The deflectable catheter was pulled out. The sheath was pulled out from the RFV and a figure of 8 suture was applied for hemostasis along with manual pressure application for 20 minutes. Another figure of 8 suture was placed over the LFV 6Fr and LFA 4Fr sheaths and applied manual pressure for hemostasis. The procedure was ended at this stage.        EQUIPMENT  1.The pacemaker is a  Maxtatronic Micra  [id number], Serial [id number].       MEASUREMENTS  The RV is sensing R waves of 5.2 mV and a pacing capture threshold of 0.88 @ 0.24 msec with an impedance of 580 ohms.     FINAL PROGRAMMING  Festus Settings:  -Pacing mode: VVIR.  -Lower Rate Limit: 60 ppm.  -Upper Rate Limit: 120 ppm.       The femoral sheathes were removed and hemostasis was secured.     The patient tolerated the procedure well and there were no apparent complications.  Patient left the OR in satisfactory condition and was taken to the PACU.    Minh Quintero MD  EP Fellow    EP STAFF NOTE  I was present throughout the procedure. I agree with the note above by the EP fellow.  Gaston Car MD [facility]  Cardiology - Electrophysiology

## 2023-07-31 NOTE — ANESTHESIA CARE TRANSFER NOTE
Patient: Priya Funez    Procedure: Procedure(s):  Pacemaker Leadless Device Implant  EXTRACTION, ELECTRODE LEAD, MYOCARDIAL, USING LASER       Diagnosis: Cardiac pacemaker in situ [Z95.0]  Severe tricuspid regurgitation [I07.1]  Atrial pacemaker lead displacement [T82.120A]  Complete heart block (H) [I44.2]  Diagnosis Additional Information: No value filed.    Anesthesia Type:   General     Note:      Level of Consciousness: awake  Oxygen Supplementation: nasal cannula  Level of Supplemental Oxygen (L/min / FiO2): 4  Independent Airway: airway patency satisfactory and stable  Dentition: dentition unchanged  Vital Signs Stable: post-procedure vital signs reviewed and stable  Report to RN Given: handoff report given            Vitals:  Vitals Value Taken Time   /71 07/31/23 1202   Temp 97.2    Pulse 60 07/31/23 1209   Resp 28 07/31/23 1209   SpO2 96 % 07/31/23 1209   Vitals shown include unvalidated device data.    Electronically Signed By: TAMRA Jefferson CRNA  July 31, 2023  12:10 PM

## 2023-08-01 NOTE — UTILIZATION REVIEW
"  Continued stay status; Secondary Review Determination     Admission Date: 7/31/2023  5:54 AM      Under the authority of the Utilization Management Committee, the utilization review process indicated a secondary review on the above patient.  The review outcome is based on review of the medical records, discussions with staff, and applying clinical experience noted on the date of the review.        (x)      Inpatient Status Appropriate - This patient's medical care is consistent with medical management for inpatient care and reasonable inpatient medical practice.      () Observation Status Appropriate - This patient does not meet hospital inpatient criteria and is placed in observation status. If this patient's primary payer is Medicare and was admitted as an inpatient, Condition Code 44 should be used and patient status changed to \"observation\".   () Admission Status NOT Appropriate - This patient's medical care is not consistent with medical management for Inpatient or Observation Status.          RATIONALE FOR DETERMINATION   Ms. Funez is an 82 year old female who has a medical history significant for HFpEF, severe TR, permanent AF s/p AVN ablation with PPM implant 1/11/19, emergent aortic dissection repair 1/4/19, HTN, and obesity.  She underwent previous pacemaker extraction on 7/31/2023 with new pacemaker implanted on 7/31.  She was At the hospital on outpatient status for expected routine postprocedure monitoring and care.  Unfortunately, she has developed acute hypoxic respiratory failure.  She has been diagnosed with probable fluid overload.  She has required IV furosemide for diuresis.  She is requiring supplemental oxygen.  She is now expected to require a prolonged hospital stay.  Due to this patient's advanced age, complex past medical history, need for previous pacemaker extraction with replacement of new pacemaker, acute hypoxic respiratory failure, need for supplemental oxygen, need for IV " furosemide, and new expectation of prolonged hospital stay, it is appropriate to admit this patient to the hospital as an inpatient.      The severity of illness, intensity of service provided, expected LOS and risk for adverse outcome make the care complex, high risk and appropriate for hospital admission.        The information on this document is developed by the utilization review team in order for the business office to ensure compliance.  This only denotes the appropriateness of proper admission status and does not reflect the quality of care rendered.         The definitions of Inpatient Status and Observation Status used in making the determination above are those provided in the CMS Coverage Manual, Chapter 1 and Chapter 6, section 70.4.      Sincerely,     Abdi Brown D.O.  Utilization Review/ Case Management  [facility].

## 2023-08-01 NOTE — PROGRESS NOTES
D/she continues on po Bumex and said she voided several times tonight. Pacer dressing has 2 tiny old blood spots and has slight rounding over the site. She is A flutter and 100% , she continues on 2 L NC. She states she feels better and thinks she is ready to go home in the am  A/progressing  P/monitor for changes, keep team updated

## 2023-08-01 NOTE — PROGRESS NOTES
SPIRITUAL HEALTH SERVICES  SPIRITUAL ASSESSMENT Progress Note  [facility] 6C     REFERRAL SOURCE: Admission Request     Pt Priya, her  Darek and her daughter Susie were present at time of visit.  No needs identified but they are aware of how to reach out to [facility] should that change.     PLAN: [facility] will remain available     Felecia Franklain  Pager: [phone]

## 2023-08-01 NOTE — H&P
[facility]    Cardiology History and Physical - Electrophysiology         Date of Admission:  7/31/2023  _____________________________________________________________________    Chief Complaint   SOB- here for lead extraction and Micra    History of Present Illness     Ms. Funez is an 82 year old female who has a medical history significant for HFpEF, severe TR, permanent AF s/p AVN ablation with PPM implant 1/11/19, emergent aortic dissection repair 1/4/19, HTN, and obesity.   She underwent an emergent aortic dissection repair in 2019 with a prolonged hospitalization with difficult vent weaning requiring tracheostomy. She also has AF which became permanent and she had AVN ablation and PPM implanted in 2019. She has HFpEF. She has developed severe TR. She had a ALEX to identify mechanism of TR which showed massive TR with RV pacemaker lead restricting coaptation of septal and anterior leaflets. Thus, she was referred for consideration for extraction of pacemaker lead. She reports feeling ALEXANDER/SOB.  Patient underwent a successful pacemaker lead extraction, device removal, and implant of leadless pacemaker yesterday. Device interrogation shows normal device function and stable lead parameters.     ON patient developed a 2-3 L O2 requirement as she had been desatting to the 85%on RA. CXR revealed some VOL and right lung base with some rales - could be attributed to atelectasis. She received Lasix 40 mg IV x once and is to be admitted to Cards 1 service for ON monitoring of her O2 sats, ongoing incentive spirometry and further diuresis as indicated.      Past Medical History    Past Medical History:   Diagnosis Date    Antiplatelet or antithrombotic long-term use     Benign essential hypertension     Cardiac pacemaker in situ     Medtronic    Chronic atrial fibrillation (H)     s/p AV node ablation and PPM placement January, 2019    Chronic diastolic heart failure (H)      Chronic kidney disease, stage III (moderate) (H)     Chronic right-sided heart failure (H)     Congestive heart failure (H)     NEDA (generalized anxiety disorder)     History of aortic dissection     s/p emergent repair 2019    History of blood transfusion     no adverse reactions    Hypertension     Mild mitral regurgitation     Obesity     Severe tricuspid regurgitation        Past Surgical History   Past Surgical History:   Procedure Laterality Date    ANGIOGRAM Right 01/04/2019    Procedure: DIAGONSTIC ANGIOGRAM OF SMA;  Surgeon: Rigo Jennings MD;  Location:  OR    BIOPSY BREAST      BYPASS GRAFT ARTERY CORONARY, REPAIR VALVE AORTIC, COMBINED N/A 01/04/2019    Procedure: AORTIC DISSECTION REPAIR WITH GRAFT- HEMASHIELD PLATINUM WOVEN DOUBLE VELOR 4 SIDE ARM VASCULAR GRAFT, D:35 X 12 X 10 X 10MM/ L:50CM;  Surgeon: Jose Winslow MD;  Location:  OR    EP ABLATION AV NODE N/A 01/11/2019    Procedure: EP Ablation AV Node;  Surgeon: Tsering Davey MD;  Location:  HEART CARDIAC CATH LAB    EP LEAD EXTRACTION  7/31/2023    EP PACEMAKER Left 01/11/2019    Procedure: EP Pacemaker;  Surgeon: Tsering Davey MD;  Location:  HEART CARDIAC CATH LAB    EP PACEMAKER LEADLESS DEVICE IMPLANT  7/31/2023    THYROID SURGERY      benign mass removed    TRACHEOSTOMY N/A 01/25/2019    Procedure: TRACHEOSTOMY  (DR MCCLELLAND);  Surgeon: Bryson Mcclelland MD;  Location:  OR    TRANSESOPHAGEAL ECHOCARDIOGRAM INTRAOPERATIVE N/A 6/16/2023    Procedure: ECHOCARDIOGRAM,TRANSESOPHAGEAL,WITH ANESTHESIA;  Surgeon: Dennis Meier DO;  Location: [facility] OR    TUBAL LIGATION         Prior to Admission Medications   Prior to Admission Medications   Prescriptions Last Dose Informant Patient Reported? Taking?   Acetaminophen 325 MG CAPS More than a month at prn  Yes Yes   Sig: Take 325-650 mg by mouth every 4 hours as needed   albuterol (PROAIR HFA/PROVENTIL HFA/VENTOLIN HFA) 108 (90 Base) MCG/ACT inhaler  More than a month at prn  No Yes   Sig: Inhale 2 puffs into the lungs every 6 hours as needed for shortness of breath / dyspnea or wheezing   aspirin (ASA) 81 MG EC tablet 7/30/2023 at 0900  Yes Yes   Sig: Take 81 mg by mouth every 24 hours   cholecalciferol 25 MCG (1000 UT) TABS 7/30/2023 at 0900  Yes Yes   Sig: Take 1,000 Units by mouth daily    cyanocobalamin (VITAMIN B-12) 1000 MCG tablet 7/29/2023 at 0900  Yes Yes   Sig: Take 1,000 mcg by mouth daily    empagliflozin (JARDIANCE) 10 MG TABS tablet Past Month  No Yes   Sig: Take 1 tablet (10 mg) by mouth daily   escitalopram (LEXAPRO) 10 MG tablet 7/30/2023 at 0900  No Yes   Sig: Take 1 tablet (10 mg) by mouth daily   metoprolol tartrate (LOPRESSOR) 25 MG tablet 7/31/2023 at 0500  No Yes   Sig: Take 1 tablet (25 mg) by mouth 2 times daily   mirtazapine (REMERON) 15 MG tablet 7/30/2023 at 2100  No Yes   Sig: Take 1 tablet (15 mg) by mouth At Bedtime   spironolactone (ALDACTONE) 25 MG tablet 7/30/2023 at 0000  No Yes   Sig: Take 1 tablet (25 mg) by mouth daily   torsemide (DEMADEX) 10 MG tablet 7/30/2023 at 1200  No Yes   Sig: Take 4 tablets(40mg) by mouth every day   warfarin ANTICOAGULANT (COUMADIN) 2 MG tablet 7/28/2023 at 2000  No Yes   Sig: Take one tablet (2 mg) by mouth on Fridays; take two tablets (4 mg) all other days; or as instructed by ACC team.   Patient taking differently: 4mg Sundays and Thursdays and 2mg by mouth rest of the week      Facility-Administered Medications: None        Review of Systems    The 10 point Review of Systems is negative other than noted in the HPI or here.      Physical Exam   Vital Signs: Temp: 98.6  F (37  C) Temp src: Oral BP: 92/59 Pulse: 70   Resp: 20 SpO2: 90 % O2 Device: Nasal cannula Oxygen Delivery: 2 LPM  Weight: 156 lbs 8 oz    General Appearance: Alert and oriented  Respiratory: mild rales over right lung base, left lung base clear; No wheezing  Cardiovascular: s1 s2 regular; holosystolic murmur over left hear  border; +1 le edema to upper shins; JVP to 12 cm  GI: bs+ x4, not distented  Skin: warm; groin sites without palpable hematoma; non-tender  Neuro: alert, oriented, moves all extremities, sensation of le intact      Medical Decision Making             Data     I have personally reviewed the following data over the past 24 hrs:    INR:  1.37 (H) PTT:  N/A   D-dimer:  N/A Fibrinogen:  N/A       Imaging results reviewed over the past 24 hrs:   Recent Results (from the past 24 hour(s))   X-ray Chest 2 vws*    Narrative    EXAM: XR CHEST 2 VIEWS 8/1/2023 8:51 AM    DEMOGRAPHICS: 82 years Female    INDICATION: s/p lead extraction and leadless pacemaker placement    COMPARISON: X-ray chest 7/31/2023    TECHNIQUE: Upright PA and lateral views of the chest.    FINDINGS:   Postsurgical chest with intact median sternotomy wires. Multiple  surgical clips project over the mediastinum. Leadless pacemaker is  present over the left heart. Continued enlarged cardiac silhouette,  however this has decreased since prior, likely representing  cardiomegaly. Opacification left lower lung no longer obstructs the  cardiac silhouette. Small improvement of reticular nodular densities  within the bilateral lungs. Elevated right hemidiaphragm. The trachea  is midline. No discernible pneumothorax. The costophrenic angles are  sharp without blunting.      Impression    IMPRESSION:     1. Stable postsurgical chest and stable placement of lead-less  pacemaker.  2. Cardiomegaly with left lower lung basilar opacity, likely  subsegmental atelectasis.  3. Reticular opacities in bilateral lung fields could represent  borderline findings of pulmonary edema versus atelectasis.    I have personally reviewed the examination and initial interpretation  and I agree with the findings.    NASEEM RUEDA MD         SYSTEM ID:  [id number]   Cardiac Device Check - Inpatient   Result Value    Date Time Interrogation Session [id number]    Implantable Pulse  Generator  Medtronic    Implantable Pulse Generator Model [id number] Micra VR TCP    Implantable Pulse Generator Serial Number [id number]    Type Interrogation Session In Clinic    Clinic Name [facility]    Implantable Pulse Generator Type Pacemaker    Implantable Pulse Generator Implant Date [id number]    Festus Setting Mode (NBG Code) VVIR    Festus Setting Lower Rate Limit 60    Festus Setting Maximum Sensor Rate 120    Festus Setting Hysterisis Rate DISABLED    Lead Channel Setting Sensing Polarity Bipolar    Lead Channel Setting Sensing Anode Location Right Ventricle    Lead Channel Setting Sensing Anode Terminal Ring    Lead Channel Setting Sensing Cathode Location Right Ventricle    Lead Channel Setting Sensing Cathode Terminal Tip    Lead Channel Setting Sensing Sensitivity 2    Lead Channel Setting Pacing Polarity Bipolar    Lead Channel Setting Pacing Anode Location Right Ventricle    Lead Channel Setting Pacing Anode Terminal Ring    Lead Channel Setting Sensing Cathode Location Right Ventricle    Lead Channel Setting Sensing Cathode Terminal Tip    Lead Channel Setting Pacing Pulse Width 0.24    Lead Channel Setting Pacing Amplitude 3    Lead Channel Setting Pacing Capture Mode Adaptive    Zone Setting Type Category VF    Zone Setting Type Category VT    Zone Setting Type Category VT    Zone Setting Type Category VT    Zone Setting Type Category ATRIAL_FIBRILLATION    Zone Setting Type Category AT/AF    Lead Channel Impedance Value 500    Lead Channel Pacing Threshold Amplitude 1.125    Lead Channel Pacing Threshold Pulse Width 0.24    Battery Date Time of Measurements [id number]    Battery Status OK    Battery RRT Trigger 2.5579    Battery Remaining Longevity 95    Battery Voltage 3.15    Festus Statistic Date Time Start [id number]    Festus Statistic Date Time End [id number]    Festus Statistic RV Percent Paced 99.95    Narrative    Patient seen on [facility] 6C for  evaluation and iterative programming of their leadless pacemaker per MD orders. Micra was implanted 7/31/23 and previous device (Medtronic Nicole) was explanted.    Device: Medtronic [id number] Micra VR TCP  Normal Device Function.  Mode: VVIR  bpm  : 99.9%  Intrinsic Rhythm:  @ 30 bpm  Short V-V intervals: 0  Estimated battery longevity to RRT = >8 years. Battery voltage = 3.15V.   Setting Changes: None  Plan: RTC in 1 week for device and incision check.  Winston RN    Leadless pacemaker    I have reviewed and interpreted the device interrogation, settings, programming and nurse's summary. The device is functioning within normal device parameters. I agree with the current findings, assessment and plan.          A/P    Ms. Funez is an 82 year old female who has a medical history significant for HFpEF, severe TR, permanent AF s/p AVN ablation with PPM implant 1/11/19, emergent aortic dissection repair 1/4/19, HTN, and obesity, who presented for lead extraction and Micra implantation (due to severe TR caused by the RV lead), with course complicated by post op mild O2 requirement, likely due to atelectasis and mild VOL.    # HFpEF  # Severe TR 2/2 RV lead  Status: warm and mildly wet; NYHA II, ACC C  - Continue Jardiance 10 mg daily, Metoprolol 25 mg daily  - Continue Torsemide 40 mg daily and Spironolactone 25 mg daily as outpatient  - s/p Lasix 40 mg IV once; would repeat if needed for target NN 1-2 L in 24 hours    # permanent AF s/p AVN ablation with PPM and now s/p lead extraction with Micra  - Metoprolol tartrate 25 mg BID  - Continue on Warfarin 2 mg daily - resumed post-procedure today and to check INR as planned  - Can continue Aspirin, although unclear indication to patient- she can resume this in a week post-procedure    # Mood: Continue Escitalopram 10 mg daily and Mirtazapine  # Vitamin supplementation: continue Vit D3 25 mcg    Code: Full code  Access: PIV  PPX: Warfarin  Diet: cardiac  healthy  Dispo; Cards 1 pending O2 requirement resolution

## 2023-08-01 NOTE — PROGRESS NOTES
Admitted 7/31 for replacement of pacemaker     Neuro: A&O x4  Cardio: V paced  GI/: Low saturated fat diet. Last BM before admission. Due to void.   Resp: 3L NC   Mobility: Stand by assist w walker   Pain: Generalized pain prn given   Skin: See charting   LDAs: 2 PIV on R & L forearm   Plan: discharge 8/1

## 2023-08-01 NOTE — PHARMACY-ADMISSION MEDICATION HISTORY
Pharmacist Admission Medication History    Admission medication history is complete. The information provided in this note is only as accurate as the sources available at the time of the update.    Medication reconciliation/reorder completed by provider prior to medication history? Yes    Information Source(s): Patient via in-person    Pertinent Information: Patient last took empagliflozin about 2 weeks ago as she has been told to hold for a few days and then restart and hold again per providers, so she stopped taking it for a bit but plans to restart.    Changes made to PTA medication list:  Added: None  Deleted: None  Changed: Vit B12 product from 100mcg tab to 1000mcg tab      Allergies reviewed with patient and updates made in EHR: yes    Medication History Completed By: Aisha Chase RPH 8/1/2023 9:59 AM    Prior to Admission medications    Medication Sig Last Dose Taking? Auth Provider Long Term End Date   Acetaminophen 325 MG CAPS Take 325-650 mg by mouth every 4 hours as needed More than a month at prn Yes Reported, Patient     albuterol (PROAIR HFA/PROVENTIL HFA/VENTOLIN HFA) 108 (90 Base) MCG/ACT inhaler Inhale 2 puffs into the lungs every 6 hours as needed for shortness of breath / dyspnea or wheezing More than a month at prn Yes Sharmaine Burks MD Yes    aspirin (ASA) 81 MG EC tablet Take 81 mg by mouth every 24 hours 7/30/2023 at 0900 Yes Reported, Patient     cholecalciferol 25 MCG (1000 UT) TABS Take 1,000 Units by mouth daily  7/30/2023 at 0900 Yes Reported, Patient     cyanocobalamin (VITAMIN B-12) 1000 MCG tablet Take 1,000 mcg by mouth daily  7/29/2023 at 0900 Yes Reported, Patient     empagliflozin (JARDIANCE) 10 MG TABS tablet Take 1 tablet (10 mg) by mouth daily Past Month Yes Becky Kwok MD     escitalopram (LEXAPRO) 10 MG tablet Take 1 tablet (10 mg) by mouth daily 7/30/2023 at 0900 Yes Sharmaine Burks MD Yes    metoprolol tartrate (LOPRESSOR) 25 MG tablet Take 1 tablet (25 mg) by  mouth 2 times daily 7/31/2023 at 0500 Yes Becky Kwok MD Yes    mirtazapine (REMERON) 15 MG tablet Take 1 tablet (15 mg) by mouth At Bedtime 7/30/2023 at 2100 Yes Sharmaine Burks MD Yes    spironolactone (ALDACTONE) 25 MG tablet Take 1 tablet (25 mg) by mouth daily 7/30/2023 at 0000 Yes Becky Kwok MD Yes    torsemide (DEMADEX) 10 MG tablet Take 4 tablets(40mg) by mouth every day 7/30/2023 at 1200 Yes Flaquita Orr PA-C Yes    warfarin ANTICOAGULANT (COUMADIN) 2 MG tablet Take one tablet (2 mg) by mouth on Fridays; take two tablets (4 mg) all other days; or as instructed by ACC team.  Patient taking differently: 4mg Sundays and Thursdays and 2mg by mouth rest of the week 7/28/2023 at 2000 Yes Sharmaine Burks MD Yes

## 2023-08-01 NOTE — PLAN OF CARE
Pt admitted 7/31 with ALEXANDER and found lead restriction on PPM.  S/p leadless PPM yesterday.  Pt discharged delayed a day r/t O2 requirements.  One time dose of Lasix given today.  IS instruction with pt demonstration done.  Pt up SBA with walker.  Continue to monitor and with POC.

## 2023-08-02 NOTE — OP NOTE
OPERATIVE DATE: 8/1/2023    PRE-OPERATIVE DIAGNOSIS:  1) Severe tricuspid insufficiency from pacemaker leads  2) History of aortic dissection repair  Patient Active Problem List   Diagnosis     Chronic diastolic heart failure (H)     Chronic atrial fibrillation (H)     Benign essential hypertension     Chronic kidney disease, stage III (moderate) (H)     Chronic right-sided heart failure (H)     Severe tricuspid regurgitation     History of aortic dissection     NEDA (generalized anxiety disorder)     History of blood transfusion     Cardiac pacemaker in situ     Complete heart block (H)     Atrial pacemaker lead displacement     Displacement of atrial pacemaker leads, initial encounter       POST-OPERATIVE DIAGNOSIS:  1) Severe tricuspid insufficiency from pacemaker leads  2) History of aortic dissection repair  Patient Active Problem List   Diagnosis     Chronic diastolic heart failure (H)     Chronic atrial fibrillation (H)     Benign essential hypertension     Chronic kidney disease, stage III (moderate) (H)     Chronic right-sided heart failure (H)     Severe tricuspid regurgitation     History of aortic dissection     NEDA (generalized anxiety disorder)     History of blood transfusion     Cardiac pacemaker in situ     Complete heart block (H)     Atrial pacemaker lead displacement     Displacement of atrial pacemaker leads, initial encounter       PROCEDURE:  1) Pacemaker extraction standby  2) Removal of PPM     : Gaston Car MD    CO-SURGEON: Semaj Ramos MD    ANESTHESIA: GETA    ESTIMATED BLOOD LOSS: 10cc    INDICATIONS:  Ms. Priya Funez is a 82 year old year old female admitted with severe tricuspid insufficiency.  We were asked to standby for pacemaker lead extraction.  Risks and benefits of the operation were explained to the patient and their family including, but not limited to, bleeding, infection, stroke and even death.  They understood these risks and agreed to proceed  electively.    OPERATIVE REPORT:  The patient was transferred to the operating room and positioned supine on the OR table.  General anesthesia was monitored by the anesthesia team. The patients chest abdomen and bilateral lower extremities were clipped, prepped and draped in sterile fashion.  A pre-procedure time-out was performed confirming the correct patient, correct site and correct procedure.    Pacemaker generator removal and laser lead extraction was performed by Dr. Car.  Please refer to his dictation for details.  No operative intervention was needed after lead extraction.    All needle, sponge and instrument counts were correct at the end of the case.    Semaj Ramos  Cardiothoracic Surgery  Pager: [phone]

## 2023-08-02 NOTE — DISCHARGE SUMMARY
[address] [address]  p: [phone]    Discharge Summary: Cardiology Service    Priya Funez MRN# [id number]   YOB: 1940 Age: 82 year old       Admission Date: 7/31/2023  Discharge Date: 08/02/23    Discharge Diagnoses:  1. Acute on Chronic Heart Failure w/ Preserved Ejection Fraction NYHA II, ACC C  2. Acute Hypoxic Respiratory Failure   3. Severe Tricuspid Regurgitation 2/2 RV Lead  4. Hypertension  5. Permaneent AF s/p AVN Ablation w/ PPM  6. S/p Lead Extraction (7/31/23)  7. S/p Implantation of Leadless Pacemaker (7/31/23)    Brief HPI:  Priya Funez is a 82 year old year old female with a medical history significant for HFpEF, severe TR, permanent AF s/p AVN ablation w/ PPM implant (1/11/2019), emergent aortic dissection repair (1/4/2019), HTN, and obesity. She was admitted for diuresis after planned outpatient lead extraction and leadless pacemaker implant. After the procedure she developed increasing O2 needs and concerns for pulmonary edema. She was diuresed with IV Lasix. O2 requirements returned to baseline and she was discharged home in stable condition.     Hospital Course by Diagnosis:  # Acute on Chronic Heart Failure w/ Preserved Ejection Fraction NYHA II, ACC C  # Acute Hypoxic Respiratory Failure   # Severe Tricuspid Regurgitation 2/2 RV Lead   # Hypertension   Severe TR discovered on echocardiogram. Underwent ALEX to determine etiology which demonstrated massive TR with RVC pacemaker lead restricting coaptation of septal and anterior leaflets. Underwent successful lead extraction and implantation of leadless pacemaker on 7/31/23. Developed increasing O2 requiring post procedure with O2 sats ~ 85% on RA. CXR showing cardiomegaly and bilateral opacities concerning for pulmonary edema.   - Fluid Status: Near euvolemic; resume PTA Torsemide   - Continue PTA Jardiance 10mg daily  - Spironolactone 25mg daily   -  Daily standing weights  - 2g Na Diet      # Permanent AF s/p AVN Ablation w/ PPM  # s/p Lead Extraction (7/31/23)  # s/p Implantation of Leadless Pacemaker (7/31/23)  - Rate Control: Continue PTA Lopressor 25mg BID  - Anticoagulation: Continue PTA Coumadin; INR check in one week    - Resume PTA Aspirin 81mg in one week  - Follow-up in device clinic in one week  - Follow-up with EP in 2-3 months    Pertinent Procedures:  1. Lead Extraction 7/31/23  2. Micra Pacemaker Implant 7/31/23    Consults:  - Electrophysiology    Medication Changes:  - None     Discharge medications:   Current Discharge Medication List        CONTINUE these medications which have NOT CHANGED    Details   Acetaminophen 325 MG CAPS Take 325-650 mg by mouth every 4 hours as needed      albuterol (PROAIR HFA/PROVENTIL HFA/VENTOLIN HFA) 108 (90 Base) MCG/ACT inhaler Inhale 2 puffs into the lungs every 6 hours as needed for shortness of breath / dyspnea or wheezing  Qty: 18 g, Refills: 0    Comments: Pharmacy may dispense brand covered by insurance (Proair, or proventil or ventolin or generic albuterol inhaler)  Associated Diagnoses: Cough      aspirin (ASA) 81 MG EC tablet Take 81 mg by mouth every 24 hours      cholecalciferol 25 MCG (1000 UT) TABS Take 1,000 Units by mouth daily       cyanocobalamin (VITAMIN B-12) 1000 MCG tablet Take 1,000 mcg by mouth daily       empagliflozin (JARDIANCE) 10 MG TABS tablet Take 1 tablet (10 mg) by mouth daily  Qty: 90 tablet, Refills: 3    Associated Diagnoses: Chronic diastolic congestive heart failure (H)      escitalopram (LEXAPRO) 10 MG tablet Take 1 tablet (10 mg) by mouth daily  Qty: 90 tablet, Refills: 3    Associated Diagnoses: NEDA (generalized anxiety disorder)      metoprolol tartrate (LOPRESSOR) 25 MG tablet Take 1 tablet (25 mg) by mouth 2 times daily  Qty: 180 tablet, Refills: 3    Associated Diagnoses: Chronic diastolic congestive heart failure (H); Benign essential hypertension; Persistent atrial  fibrillation (H)      mirtazapine (REMERON) 15 MG tablet Take 1 tablet (15 mg) by mouth At Bedtime  Qty: 90 tablet, Refills: 3    Associated Diagnoses: Trouble in sleeping      spironolactone (ALDACTONE) 25 MG tablet Take 1 tablet (25 mg) by mouth daily  Qty: 90 tablet, Refills: 3    Associated Diagnoses: Chronic diastolic congestive heart failure (H); Chronic right-sided heart failure (H)      torsemide (DEMADEX) 10 MG tablet Take 4 tablets(40mg) by mouth every day  Qty: 360 tablet, Refills: 3    Comments: This replaces previous Rx - do not fill until requested by patient  Associated Diagnoses: Chronic diastolic congestive heart failure (H)      warfarin ANTICOAGULANT (COUMADIN) 2 MG tablet Take one tablet (2 mg) by mouth on ; take two tablets (4 mg) all other days; or as instructed by ACC team.  Qty: 170 tablet, Refills: 1    Associated Diagnoses: Chronic atrial fibrillation (H)             Follow-up:  - Device clinic in one week  - EP in 2-3 months    Labs or imaging requiring follow-up after discharge:  - None    Code status:  Full     Condition on discharge  Temp:  [97.7  F (36.5  C)-98.6  F (37  C)] 97.7  F (36.5  C)  Pulse:  [61-70] 65  Resp:  [17-24] 17  BP: ()/(50-70) 110/70  SpO2:  [85 %-96 %] 96 %    General: Alert, interactive, NAD  Eyes: sclera anicteric, EOMI  Neck: JVP not elevated, carotid 2+ bilaterally  Cardiovascular: regular rate and rhythm, normal S1 and S2, no murmurs, gallops, or rubs  Resp: clear to auscultation bilaterally, no rales, wheezes, or rhonchi  GI: Soft, nontender, nondistended. +BS.  No HSM or masses, no rebound or guarding.  Extremities: no edema, no cyanosis or clubbing, dorsalis pedis and posterior tibialis pulses 2+ bilaterally  Skin: Warm and dry, no jaundice or rash  Neuro: CN 2-12 intact, moves all extremities equally  Psych: Alert & oriented x 3    Imaging with results:  23 EC/1/23 Device Check:  Device: [id number] [id number] Micra VR TCP  Normal  Device Function.  Mode: VVIR  bpm  : 99.9%  Intrinsic Rhythm:  @ 30 bpm  Short V-V intervals: 0  Estimated battery longevity to RRT = >8 years. Battery voltage = 3.15V.   Setting Changes: None  Plan: RTC in 1 week for device and incision check.  KIKE Montero     7/31/23 Intra OP [place]:  Moderately reduced LV systolic function w/ LVEF 45%. Mildly reduced RV   systolic function but severely dilated chamber. Likely Grade III DD. Mild   MR. Mild AS. Severe torrential TR w/ HVF reversal. Aneurysmal IAS w/ PFO   by CFD. No pericardial effusion. Chronic dissection visualized in the   descending aorta which was previously known. All findings communicated   with surgical team.     POST:   S/p PPM lead extraction and leadless PPM placement. Global biventricular   function unchanged. No new RWMAs. TR unchanged and still torrential. PPM   leads no longer in the RV. New leadless PPM visualized in the RV apex.   Trace pericardial effusion is new but stable without chamber collapse.   Aortic dissection unchanged. All findings communicated with surgical team.     Patient Care Team:  Sharmaine Burks MD as PCP - General (Internal Medicine)  Sharmaine Burks MD as Assigned PCP  Xochitl Eden APRN CNP as Assigned Heart and Vascular Provider  Gaston Car MD as MD (Cardiovascular Disease)  Jesika Rockwell, RN as Specialty Care Coordinator (Cardiology)    Time Spent on this Encounter   I, TAMRA Cardenas CNP, personally saw the patient today and spent greater than 30 minutes discharging this patient.     Patient discussed with staff cardiologist, Dr. Ty, who agrees with the above documentation and plan. Documentation represents joint decision making.     TAMRA Cardenas CNP on 8/2/2023 at 9:16 AM  [facility] Cardiology      This is shared discharge summary with Lucie Whiting APRN    Discharge Diagnoses:  1. Acute on Chronic Heart Failure w/ Preserved Ejection Fraction NYHA II, ACC C  2. Acute  Hypoxic Respiratory Failure   3. Severe Tricuspid Regurgitation 2/2 RV Lead  4. Hypertension  5. Permaneent AF s/p AVN Ablation w/ PPM  6. S/p Lead Extraction (7/31/23)  7. S/p Implantation of Leadless Pacemaker (7/31/23)    Brief HPI:  Priya Funez is a 82 year old year old female with a medical history significant for HFpEF, severe TR, permanent AF s/p AVN ablation w/ PPM implant (1/11/2019), emergent aortic dissection repair (1/4/2019), HTN, and obesity. She was admitted for diuresis after planned outpatient lead extraction and leadless pacemaker implant. After the procedure she developed increasing O2 needs and concerns for pulmonary edema. She was diuresed with IV Lasix. O2 requirements returned to baseline and she was discharged home in stable condition.     Hospital Course by Diagnosis:  # Acute on Chronic Heart Failure w/ Preserved Ejection Fraction NYHA II, ACC C  # Acute Hypoxic Respiratory Failure   # Severe Tricuspid Regurgitation 2/2 RV Lead   # Hypertension   Severe TR discovered on echocardiogram. Underwent ALEX to determine etiology which demonstrated massive TR with RVC pacemaker lead restricting coaptation of septal and anterior leaflets. Underwent successful lead extraction and implantation of leadless pacemaker on 7/31/23. Developed increasing O2 requiring post procedure with O2 sats ~ 85% on RA. CXR showing cardiomegaly and bilateral opacities concerning for pulmonary edema.   - Fluid Status: Near euvolemic; resume PTA Torsemide   - Continue PTA Jardiance 10mg daily  - Spironolactone 25mg daily   - Daily standing weights  - 2g Na Diet      # Permanent AF s/p AVN Ablation w/ PPM  # s/p Lead Extraction (7/31/23)  # s/p Implantation of Leadless Pacemaker (7/31/23)  - Rate Control: Continue PTA Lopressor 25mg BID  - Anticoagulation: Continue PTA Coumadin; INR check in one week    - Resume PTA Aspirin 81mg in one week  - Follow-up in device clinic in one week  - Follow-up with EP in 2-3  months    Pertinent Procedures:  1. Lead Extraction 7/31/23  2. Micra Pacemaker Implant 7/31/23    Consults:  - Electrophysiology    Medication Changes:  - None       Follow-up:  - Device clinic in one week  - EP in 2-3 months    Labs or imaging requiring follow-up after discharge:  - None    Code status:  Full       Condition on discharge  Temp:  [97.7  F (36.5  C)-98.6  F (37  C)] 97.7  F (36.5  C)  Pulse:  [61-70] 65  Resp:  [17-24] 17  BP: ()/(50-70) 110/70  SpO2:  [85 %-96 %] 96 %    General: Alert, interactive, NAD  Eyes: sclera anicteric, EOMI  Neck: JVP not elevated, carotid 2+ bilaterally  Cardiovascular: regular rate and rhythm, normal S1 and S2, no murmurs, gallops, or rubs  Resp: clear to auscultation bilaterally, no rales, wheezes, or rhonchi  GI: Soft, nontender, nondistended. +BS.  No HSM or masses, no rebound or guarding.  Extremities: no edema, no cyanosis or clubbing, dorsalis pedis and posterior tibialis pulses 2+ bilaterally  Skin: Warm and dry, no jaundice or rash  Neuro: CN 2-12 intact, moves all extremities equally  Psych: Alert & oriented x 3    I saw, evaluated and examined patient with CV fellow.  I reviewed and discussed with patient and APRN   The results of Labs, EKG, Echocardiogram and device check.    I discussed the therapeutic plan with patient and CV fellow.  I agree with CV fellow's note and I edited the note to make it a more comprehensive note.    35 min were spend directly with patient and APRN.    Bruce Ty MD, PhD  Professor of Medicine  Division of Cardiology

## 2023-08-02 NOTE — TELEPHONE ENCOUNTER
ANTICOAGULATION  MANAGEMENT: Discharge Review    Priya Funez chart reviewed for anticoagulation continuity of care    Hospital Admission on 7/31/23 - 8/2/23 for Lead extraction and Implantation of Leadless PPM, then developed pulmonary edema, was diuresed with IV Lasix and discharged home.    Discharge disposition: Home    Results:    Recent labs: (last 7 days)     07/31/23  0649 07/31/23  1847 08/01/23  0536 08/02/23  0647   INR 1.38* 1.35* 1.37* 1.72*     Anticoagulation inpatient management:     more warfarin administered than maintenance regimen     Anticoagulation discharge instructions:     Warfarin dosing: home regimen continued   Bridging: No   INR goal change: No      Medication changes affecting anticoagulation: No    Additional factors affecting anticoagulation: No     PLAN     Agree with dosing adjustment on discharge  Agree with discharge plan for follow up on one week after discharge. Patient is already scheduled 8/7/23    Patient not contacted    Anticoagulation Calendar updated    Amara Villarreal RN

## 2023-08-02 NOTE — PLAN OF CARE
Pt admitted 7/31 for PPM lead extraction and placement of leadless PPM after ALEX (6/2023) showed severe TR with RV pacemaker lead restricting leaflets. PMH includes HFpEF, severe TR, permanent AF s/p ablation with PPM implant (2019), emergent aortic dissection repair (2019), HTN, obesity.    Neuro: A&O x 4. Afebrile. Pleasant affect. Calls appropriately. Slept well overnight.   Cardiac: 100% V Paced, underlying A Flutter 60s. SBP 110s. Denies dizziness, chest pain, or palpitations.   Respiratory: Sating well on 2L NC. LS clear. Denies cough.   GI/: Good UOP via toilet. LBM 7/30. Denies nausea.   Diet/Appetite: 2.4G Na/low fat   Skin: Bilateral groin sites - R covered with primapore, dried drainage - C/D/I, L covered with tegaderm, soft/no hematoma.   LDA: R & L PIV - SL.   Activity: Independent, uses walker.  Pain: Denies     Plan: Possible discharge home today (8/2). Continue to monitor and alert team (Cards 1) with any changes or concerns.

## 2023-08-03 NOTE — PROGRESS NOTES
"Clinic Care Coordination Contact  [facility]: Post-Discharge Note  SITUATION                                                      Admission:    Admission Date: 07/31/23   Reason for Admission: Acute on Chronic Heart Failure w/ Preserved Ejection Fraction NYHA II, ACC C  Discharge:   Discharge Date: 08/02/23  Discharge Diagnosis: Acute on Chronic Heart Failure w/ Preserved Ejection Fraction NYHA II, ACC C    BACKGROUND                                                      Per hospital discharge summary and inpatient provider notes:Priya Funez is a 82 year old year old female with a medical history significant for HFpEF, severe TR, permanent AF s/p AVN ablation w/ PPM implant (1/11/2019), emergent aortic dissection repair (1/4/2019), HTN, and obesity. She was admitted for diuresis after planned outpatient lead extraction and leadless pacemaker implant. After the procedure she developed increasing O2 needs and concerns for pulmonary edema. She was diuresed with IV Lasix. O2 requirements returned to baseline and she was discharged home in stable condition.          ASSESSMENT           Discharge Assessment  How are you doing now that you are home?: \" I seem to be doing good \"  How are your symptoms? (Red Flag symptoms escalate to triage hotline per guidelines): Improved  Do you feel your condition is stable enough to be safe at home until your provider visit?: Yes  Does the patient have their discharge instructions? : Yes  Does the patient have questions regarding their discharge instructions? : No  Were you started on any new medications or were there changes to any of your previous medications? : No  Does the patient have all of their medications?: Yes  Do you have questions regarding any of your medications? : No  Do you have all of your needed medical supplies or equipment (DME)?  (i.e. oxygen tank, CPAP, cane, etc.): Yes  Discharge follow-up appointment scheduled within 14 calendar days? : Yes  Discharge " Follow Up Appointment Date: 08/07/23  Discharge Follow Up Appointment Scheduled with?: Specialty Care Provider    Post-op ([facility] Only)  If the patient had a surgery or procedure, do they have any questions for a nurse?: No             PLAN                                                      Outpatient Plan:  Follow Up ([facility])  Follow up with Dr. Car in 3 months after procedure    Future Appointments   Date Time Provider Department Center   8/7/2023 10:30 AM UC CV DEVICE 1 [facility]   8/7/2023  2:30 PM [facility]   9[facility]26[facility]2023 12:00 [facility]   11[facility]1/2023 10:30 [facility]   11/1/2023 11:00 AM Parisa Patricia APRN CNP [facility]         For any urgent concerns, please contact our 24 hour nurse triage line: [phone] ([phone])         Addison Rivas

## 2023-08-07 NOTE — PATIENT INSTRUCTIONS
It was a pleasure to see you in clinic today.  Please do not hesitate to call with any questions or concerns.  We look forward to seeing you in clinic at your next device check.    Cristel Jaramillo, RN, BSN  Electrophysiology Nurse Clinician  [facility]    During Business Hours Please Call:  [phone]  After Hours Please Call:  [phone] - select option #4 and ask for job code [phone]

## 2023-08-07 NOTE — PROGRESS NOTES
ANTICOAGULATION MANAGEMENT     Priya MARISOL Funez 82 year old female is on warfarin with subtherapeutic INR result. (Goal INR 2.0-3.0)    Recent labs: (last 7 days)     08/07/23  1442   INR 1.8*       ASSESSMENT     Source(s): Chart Review and Patient/Caregiver Call     Warfarin doses taken: Warfarin taken as instructed  Diet: No new diet changes identified  Medication/supplement changes: None noted  New illness, injury, or hospitalization: yes 7/31 Pacemaker & displacements of arterial pacemaker leads  Signs or symptoms of bleeding or clotting: No  bruising from hospital procedures  Previous result: Subtherapeutic  Additional findings: None       PLAN     Recommended plan for no diet, medication or health factor changes and recent heart valve replacement last 10 weeks affecting INR     Dosing Instructions: booster dose then continue your current warfarin dose with next INR in 4 days       Summary  As of 8/7/2023      Full warfarin instructions:  8/7: 4 mg; Otherwise 4 mg every Sun, Thu; 2 mg all other days   Next INR check:  8/11/2023               Telephone call with Priya who verbalizes understanding and agrees to plan    Lab visit scheduled    Education provided:   Please call back if any changes to your diet, medications or how you've been taking warfarin    Plan made per [facility] anticoagulation protocol    Christine Adames RN  Anticoagulation Clinic  8/7/2023    _______________________________________________________________________     Anticoagulation Episode Summary       Current INR goal:  2.0-3.0   TTR:  61.1 % (1 y)   Target end date:  Indefinite   Send INR reminders to:  [facility]    Indications    Chronic atrial fibrillation (H) [I48.20]  Current use of long term anticoagulation (Resolved) [Z79.01]             Comments:               Anticoagulation Care Providers       Provider Role Specialty Phone number    Sharmaine Burks MD Referring Internal Medicine [phone]

## 2023-08-11 NOTE — PROGRESS NOTES
ANTICOAGULATION MANAGEMENT     Priya MARISOL Funez 82 year old female is on warfarin with therapeutic INR result. (Goal INR 2.0-3.0)    Recent labs: (last 7 days)     08/11/23  1423   INR 2.1*       ASSESSMENT     Source(s): Chart Review and Patient/Caregiver Call     Warfarin doses taken: Warfarin taken as instructed  Diet: No new diet changes identified  Medication/supplement changes: None noted  New illness, injury, or hospitalization: No  Signs or symptoms of bleeding or clotting: No  Previous result: Subtherapeutic  Additional findings: None and 7/31 cardiac pacemaker insitu       PLAN     Recommended plan for temporary change(s) affecting INR     Dosing Instructions: Continue your current warfarin dose with next INR in 1 week       Summary  As of 8/11/2023      Full warfarin instructions:  4 mg every Sun, Thu; 2 mg all other days   Next INR check:  8/18/2023               Telephone call with Priya who verbalizes understanding and agrees to plan    Lab visit scheduled    Education provided:   Please call back if any changes to your diet, medications or how you've been taking warfarin    Plan made per [facility] anticoagulation protocol    Christine Adames RN  Anticoagulation Clinic  8/11/2023    _______________________________________________________________________     Anticoagulation Episode Summary       Current INR goal:  2.0-3.0   TTR:  61.5 % (1 y)   Target end date:  Indefinite   Send INR reminders to:  [facility]    Indications    Chronic atrial fibrillation (H) [I48.20]  Current use of long term anticoagulation (Resolved) [Z79.01]             Comments:               Anticoagulation Care Providers       Provider Role Specialty Phone number    Sharmaine Burks MD Referring Internal Medicine [phone]

## 2023-08-14 NOTE — TELEPHONE ENCOUNTER
Patient had a MICRA placed with Dr. Car and is calling because she has questions and says waiting until Nov to see EP isn't good enough. She states she has a leaky valve and doesn't want to delay care for that.     She confirmed that she saw the device team on 8/7 for her incision check but has questions for the provider/team.     Saige Álvarez  Periop Electrophysiology   [phone]

## 2023-08-14 NOTE — TELEPHONE ENCOUNTER
Spoke with pt and confirmed that her follow up is scheduled appropriately at this time.     We discussed her valve and I recommended she reach out to her primary cardiology team to let them know her pacemaker procedure is complete and discuss what testing is needed to assess valve at this point. Pt is in agreement with this plan and will contact us with any questions related to her pacemaker.     Les Perry RN   Cardiology Nurse Coordinator

## 2023-08-15 NOTE — TELEPHONE ENCOUNTER
Spoke with patient about the CLASP TR trial post lead extraction.     She would like to move ahead with the study and she is aware that we are going to have to re-do imaging as well as some data collection. Patient has no concerns or any other questions at this time.     Working on getting imaging set up and will contact patient with a date for imaging.    Sierra R. Behr-Holewinski

## 2023-08-18 NOTE — PROGRESS NOTES
ANTICOAGULATION MANAGEMENT     Priya Funez 82 year old female is on warfarin with subtherapeutic INR result. (Goal INR 2.0-3.0)    Recent labs: (last 7 days)     08/18/23  1437   INR 1.5*       ASSESSMENT     Source(s): Chart Review and Patient/Caregiver Call     Warfarin doses taken: Less warfarin taken than planned which may be affecting INR. She found a warfarin tablet next to her chair today, probably missed it from last night when she was supposed to take two tablets so she only took one tablet by accident.  Diet: No new diet changes identified  Medication/supplement changes: None noted  New illness, injury, or hospitalization: No  Signs or symptoms of bleeding or clotting: No  Previous result: Therapeutic last visit; previously outside of goal range  Additional findings: None, will be pursuing valve repair/replacement and understands to let ACC RN know when surgery is scheduled so we can work on warfarin hold plan.       PLAN     Recommended plan for temporary change(s) affecting INR     Dosing Instructions: booster dose then continue your current warfarin dose with next INR in 1-2 weeks       Summary  As of 8/18/2023      Full warfarin instructions:  8/18: 4 mg; Otherwise 4 mg every Sun, Thu; 2 mg all other days   Next INR check:  8/25/2023               Telephone call with Priya who verbalizes understanding and agrees to plan    Lab visit scheduled    Education provided:   Please call back if any changes to your diet, medications or how you've been taking warfarin  Contact [phone]  with any changes, questions or concerns.     Plan made per [facility] anticoagulation protocol    Amara Villarreal, RN  Anticoagulation Clinic  8/18/2023    _______________________________________________________________________     Anticoagulation Episode Summary       Current INR goal:  2.0-3.0   TTR:  61.8 % (1 y)   Target end date:  Indefinite   Send INR reminders to:  [facility]    Indications     Chronic atrial fibrillation (H) [I48.20]  Current use of long term anticoagulation (Resolved) [Z79.01]             Comments:               Anticoagulation Care Providers       Provider Role Specialty Phone number    Sharmaine Burks MD Referring Internal Medicine [phone]

## 2023-08-23 NOTE — PATIENT INSTRUCTIONS
Coronary Angiogram   [place], [place] August 29, 2023. Please arrive at 8:30am for a 10:30am procedure.  If you need to change your appointment, please contact Xochitl at [phone]      In preparation for your procedure, we require that you do the following:  Nothing to eat or drink after midnight.  Take your usual morning medications with a small sip of water immediately upon arising on the morning of your procedure unless outlined below:  Aspirin:  Take 324mg of Aspirin (four 81 mg tabs) the day before and the morning of the procedure.  Coumadin (warfarin/Jantoven):  Stop taking this medication 5 doses prior to procedure. Last dose Thursday, 8/24   SGLT2-Inhibitors  Please STOP these medications prior to procedure per instructions below:   Jardiance - 3 doses. Last dose Saturday, 8/26  Other Medications:   Torsemide  HOLD the AM of procedure  Spironolactone  HOLD the AM of procedure      You will be unable to drive after your procedure; please arrange to have someone drive you home. Make sure that there is a responsible adult with you for 24 hours after your procedure. Your procedure will be cancelled if you do not have transportation home or someone staying with you for 24 hrs.     [facility]EX at [facility] on September 8th. Nothing to eat or drink after midnight and hold spironolactone and torsemide the morning of procedure. Please arrive by 1:00 pm.     If you have any questions please call my nursing team:     Deisy RN ([phone]), Jessica RN ([phone]), and Arline STOKES ([phone])    Scheduling phone number: [phone]    It was a pleasure seeing you today!     Trinh Jacobs, TATIANA  8/23/2023    -

## 2023-08-23 NOTE — PROGRESS NOTES
Cardiology Clinic Progress Note  Priya Funez MRN# [id number]   YOB: 1940 Age: 82 year old     Primary cardiologist: Dr. Kwok    Reason for visit: H&P for ALEX/coronary angiogram     History of presenting illness:    Priya Funez is a delightful 82-year-old woman with history significant for the followin.  Hx of emergent aortic dissection repair (2019) c/b prolonged hospitalization and trach   2.  Chronic A-fib s/p AV node ablation and PPM in   3.  Chronic diastolic heart failure, NYHA class II   4.  Severe tricuspid regurgitation now part of the Clasp II trial   5.  Mild mitral regurgitation  6.  Hypertension  7.  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  S/p leadless pacemaker implantation on 23     In summary, the patient had a ALEX to identify mechanism of TR which showed massive TR with RV pacemaker lead restricting coaptation of septal and anterior leaflets.  She was referred to Dr. Car in EP at the [facility].  She ultimately underwent single-chamber pacemaker and RV lead extraction and leadless pacemaker implantation on 2023.  Subsequent transesophageal echocardiogram still showed severe tricuspid regurgitation despite lead extraction.  Therefore, she was referred back to our clinic for consideration of the Clasp II trial.    Ms. Funez comes in today, as part of her work-up for entering the Clasp II trial.  She has a coronary angiogram and right heart catheterization scheduled for [facility] Dr. Chin on 2023 as well as a transesophageal echocardiogram on 2023 at [facility].     She overall continues to feel okay.  Some days are worse than others.  She has ongoing dyspnea with exertion as well as PND and orthopnea.  She has an adjustable bed and sleeps with the head of the bed up slightly, about 10 degrees, every night.  She has not had any peripheral edema.    Her granddaughter  is getting  in 2026 and hopes to be around for this so they can share a grandparent dance together.    ASSESSMENT:    1.  Hx of emergent aortic dissection repair (1/2019) c/b prolonged hospitalization and trach   2.  Chronic A-fib s/p AV node ablation and PPM in 2019  3.  Chronic diastolic heart failure, NYHA class II   4.  Severe tricuspid regurgitation now part of the Clasp II trial   5.  Mild mitral regurgitation  6.  Hypertension  7.  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  S/p leadless pacemaker implantation on 7/31/23    PLAN:     Risks and benefits of coronary angiography were discussed today including bleeding, bruising, infection, allergic reaction, kidney damage (including need for dialysis), stroke, heart attack, vascular damage, emergency open heart surgery, up to and including death. Patient indicates understanding and is agreeable to proceed.   She will stop her Coumadin on 8/24/2023 in preparation for procedure.  We also discussed risk and benefits of transesophageal echocardiogram including damage to the oral cavity, esophageal perforation, GI bleeding, pharyngeal hematoma, transient bronchospasm, transient hypoxia, arrhthymias (NSVT, transient atrial fibrillation), vomiting, hemoptysis, and complications from anesthesia. Patient understands and wishes to proceed.  She will follow-up appropriately with our valve team pending findings from above testing.         Trinh Jacobs, JOANNE, APRN, CNP  Page: [phone] (8a-5p M-F)    Orders this Visit:  No orders of the defined types were placed in this encounter.    No orders of the defined types were placed in this encounter.    There are no discontinued medications.    Today's clinic visit entailed:  Review of the result(s) of each unique test - echo, ALEX, lead extraction, TTE  Ordering of each unique test  Prescription drug management  40 minutes spent by me on the date of the encounter doing chart  "review, review of test results, interpretation of tests, patient visit, and documentation   Provider  Link to [facility] Help Grid     The level of medical decision making during this visit was of moderate complexity.           Review of Systems:     Review of Systems:  Skin:        Eyes:       ENT:       Respiratory:  Positive for shortness of breath  Cardiovascular:  palpitations;chest pain;syncope or near-syncope;cyanosis;dizziness;lightheadedness;edema;Negative Positive for;exercise intolerance  Gastroenterology:      Genitourinary:       Musculoskeletal:       Neurologic:       Psychiatric:       Heme/Lymph/Imm:       Endocrine:  Negative              Physical Exam:   Vitals: /76   Pulse 86   Ht 1.613 m (5' 3.5\")   Wt 68.9 kg (152 lb)   SpO2 96%   BMI 26.50 kg/m    Constitutional:  cooperative        Skin:  warm and dry to the touch        Head:  normocephalic        Eyes:  pupils equal and round        ENT:  no pallor or cyanosis        Neck:  JVP normal        Chest:  normal breath sounds, clear to auscultation, normal A-P diameter, normal symmetry, normal respiratory excursion, no use of accessory muscles        Cardiac: regular rhythm       grade 1;holosystolic murmur          Abdomen:  abdomen soft        Vascular: pulses full and equal                                      Extremities and Back:  no edema        Neurological:  no gross motor deficits;affect appropriate             Medications:     Current Outpatient Medications   Medication Sig Dispense Refill    Acetaminophen 325 MG CAPS Take 325-650 mg by mouth every 4 hours as needed      albuterol (PROAIR HFA/PROVENTIL HFA/VENTOLIN HFA) 108 (90 Base) MCG/ACT inhaler Inhale 2 puffs into the lungs every 6 hours as needed for shortness of breath / dyspnea or wheezing 18 g 0    aspirin (ASA) 81 MG EC tablet Take 81 mg by mouth every 24 hours      cholecalciferol 25 MCG (1000 UT) TABS Take 1,000 Units by mouth daily       cyanocobalamin (VITAMIN B-12) " 1000 MCG tablet Take 1,000 mcg by mouth daily       empagliflozin (JARDIANCE) 10 MG TABS tablet Take 1 tablet (10 mg) by mouth daily 90 tablet 3    escitalopram (LEXAPRO) 10 MG tablet Take 1 tablet (10 mg) by mouth daily 90 tablet 3    metoprolol tartrate (LOPRESSOR) 25 MG tablet Take 1 tablet (25 mg) by mouth 2 times daily 180 tablet 3    mirtazapine (REMERON) 15 MG tablet Take 1 tablet (15 mg) by mouth At Bedtime 90 tablet 3    spironolactone (ALDACTONE) 25 MG tablet Take 1 tablet (25 mg) by mouth daily 90 tablet 3    torsemide (DEMADEX) 10 MG tablet Take 4 tablets(40mg) by mouth every day 360 tablet 3    warfarin ANTICOAGULANT (COUMADIN) 2 MG tablet Take one tablet (2 mg) by mouth on Fridays; take two tablets (4 mg) all other days; or as instructed by ACC team. (Patient taking differently: 4mg Sundays and Thursdays and 2mg by mouth rest of the week) 170 tablet 1       Family History   Problem Relation Age of Onset    Ataxia Mother     Heart Disease Father     Diabetes No family hx of     Myocardial Infarction No family hx of     Cerebrovascular Disease No family hx of     Coronary Artery Disease Early Onset No family hx of     Breast Cancer No family hx of     Colon Cancer No family hx of     Ovarian Cancer No family hx of        Social History     Socioeconomic History    Marital status:      Spouse name: Not on file    Number of children: Not on file    Years of education: Not on file    Highest education level: Not on file   Occupational History    Occupation: Retired - customer service   Tobacco Use    Smoking status: Never    Smokeless tobacco: Never   Vaping Use    Vaping Use: Never used   Substance and Sexual Activity    Alcohol use: Not Currently    Drug use: No    Sexual activity: Not on file   Other Topics Concern    Parent/sibling w/ CABG, MI or angioplasty before 65F 55M? Not Asked   Social History Narrative    .    Lives in home with . Fully independent.    4 adult children.    8  grandchildren.    Walks 2-3x/week.     Social Determinants of Health     Financial Resource Strain: Not on file   Food Insecurity: Not on file   Transportation Needs: Not on file   Physical Activity: Not on file   Stress: Not on file   Social Connections: Not on file   Intimate Partner Violence: Not on file   Housing Stability: Not on file            Past Medical History:     Past Medical History:   Diagnosis Date    Antiplatelet or antithrombotic long-term use     Benign essential hypertension     Cardiac pacemaker in situ     Medtronic    Chronic atrial fibrillation (H)     s/p AV node ablation and PPM placement January, 2019    Chronic diastolic heart failure (H)     Chronic kidney disease, stage III (moderate) (H)     Chronic right-sided heart failure (H)     Congestive heart failure (H)     NEDA (generalized anxiety disorder)     History of aortic dissection     s/p emergent repair 2019    History of blood transfusion     no adverse reactions    Hypertension     Mild mitral regurgitation     Obesity     Severe tricuspid regurgitation               Past Surgical History:     Past Surgical History:   Procedure Laterality Date    ANGIOGRAM Right 01/04/2019    Procedure: DIAGONSTIC ANGIOGRAM OF SMA;  Surgeon: Rigo Jennings MD;  Location:  OR    BIOPSY BREAST      BYPASS GRAFT ARTERY CORONARY, REPAIR VALVE AORTIC, COMBINED N/A 01/04/2019    Procedure: AORTIC DISSECTION REPAIR WITH GRAFT- HEMASHIELD PLATINUM WOVEN DOUBLE VELOR 4 SIDE ARM VASCULAR GRAFT, D:35 X 12 X 10 X 10MM/ L:50CM;  Surgeon: Jose Winslow MD;  Location: [facility] OR    EP ABLATION AV NODE N/A 01/11/2019    Procedure: EP Ablation AV Node;  Surgeon: Tsering Davey MD;  Location:  HEART CARDIAC CATH LAB    EP LEAD EXTRACTION  7/31/2023    EP PACEMAKER Left 01/11/2019    Procedure: EP Pacemaker;  Surgeon: Tsering Davey MD;  Location:  HEART CARDIAC CATH LAB    EP PACEMAKER LEADLESS DEVICE IMPLANT  7/31/2023    EP PACEMAKER LEADLESS DEVICE  IMPLANT N/A 7/31/2023    Procedure: Pacemaker Leadless Device Implant;  Surgeon: Gaston Car MD;  Location: [facility] OR    THYROID SURGERY      benign mass removed    TRACHEOSTOMY N/A 01/25/2019    Procedure: TRACHEOSTOMY  (DR MCCLELLAND);  Surgeon: Bryson Mcclelland MD;  Location:  OR    TRANSESOPHAGEAL ECHOCARDIOGRAM INTRAOPERATIVE N/A 6/16/2023    Procedure: ECHOCARDIOGRAM,TRANSESOPHAGEAL,WITH ANESTHESIA;  Surgeon: Dennis Meier DO;  Location: [facility] OR    TUBAL LIGATION                Allergies:   Amoxicillin and Ciprofloxacin       Data:   All laboratory data reviewed:    Recent Labs   Lab Test 06/23/21  0926 06/14/21  0927 07/15/20  0929 01/28/19  0405 01/21/19  0430 01/12/19  1203 01/07/19  0430   LDL  --  67  --   --   --   --   --    HDL  --  53  --   --   --   --   --    NHDL  --  81  --   --   --   --   --    CHOL  --  134  --   --   --   --   --    TRIG  --  71  --  96 131   < >  --    NTBNP 3,163*  --  2,198*  --   --   --   --    IRON  --   --   --   --   --   --  5*   FEB  --   --   --   --   --   --  124*   IRONSAT  --   --   --   --   --   --  4*   JA  --   --   --   --   --   --  93    < > = values in this interval not displayed.       Lab Results   Component Value Date    WBC 7.2 08/02/2023    WBC 5.3 06/14/2021    RBC 4.02 08/02/2023    RBC 4.33 06/14/2021    HGB 11.7 08/02/2023    HGB 12.8 06/14/2021    HCT 38.7 08/02/2023    HCT 40.1 06/14/2021    MCV 96 08/02/2023    MCV 93 06/14/2021    MCH 29.1 08/02/2023    MCH 29.6 06/14/2021    MCHC 30.2 (L) 08/02/2023    MCHC 31.9 06/14/2021    RDW 14.2 08/02/2023    RDW 13.5 06/14/2021     08/02/2023     06/14/2021       Lab Results   Component Value Date     08/02/2023     06/23/2021    POTASSIUM 3.8 08/02/2023    POTASSIUM 3.8 12/13/2022    POTASSIUM 4.5 06/23/2021    CHLORIDE 100 08/02/2023    CHLORIDE 99 12/13/2022    CHLORIDE 104 06/23/2021    CO2 30 (H) 08/02/2023    CO2 33 (H) 12/13/2022    CO2 31  06/23/2021    ANIONGAP 11 08/02/2023    ANIONGAP 7 12/13/2022    ANIONGAP 3 06/23/2021    GLC 99 08/02/2023    GLC 96 07/31/2023    GLC 84 12/13/2022    GLC 86 06/23/2021    BUN 27.4 (H) 08/02/2023    BUN 31 (H) 12/13/2022    BUN 30 06/23/2021    CR 1.39 (H) 08/02/2023    CR 1.29 (H) 06/23/2021    GFRESTIMATED 38 (L) 08/02/2023    GFRESTIMATED 39 (L) 06/23/2021    GFRESTBLACK 46 (L) 06/23/2021    BESS 8.8 08/02/2023    BESS 9.2 06/23/2021      Lab Results   Component Value Date    AST 22 06/16/2023    AST 27 04/04/2019    ALT 12 06/16/2023    ALT 22 06/14/2021       Lab Results   Component Value Date    A1C 5.6 01/30/2019    A1C 5.3 01/04/2019       Lab Results   Component Value Date    INR 1.5 (H) 08/18/2023    INR 2.1 (H) 08/11/2023    INR 1.72 (H) 08/02/2023    INR 1.37 (H) 08/01/2023    INR 2.30 (H) 07/09/2021    INR 2.40 (H) 06/10/2021

## 2023-08-23 NOTE — LETTER
2023    Sharmaine Burks MD  [address] [facility] [address]    RE: Priya Funez       Dear Colleague,     I had the pleasure of seeing Priya Funez in the [facility].  Cardiology Clinic Progress Note  Priya Funez MRN# [id number]   YOB: 1940 Age: 82 year old     Primary cardiologist: Dr. Kwok    Reason for visit: H&P for ALEX/coronary angiogram     History of presenting illness:    Priya Funez is a delightful 82-year-old woman with history significant for the followin.  Hx of emergent aortic dissection repair (2019) c/b prolonged hospitalization and trach   2.  Chronic A-fib s/p AV node ablation and PPM in   3.  Chronic diastolic heart failure, NYHA class II   4.  Severe tricuspid regurgitation now part of the Clasp II trial   5.  Mild mitral regurgitation  6.  Hypertension  7.  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  S/p leadless pacemaker implantation on 23     In summary, the patient had a ALEX to identify mechanism of TR which showed massive TR with RV pacemaker lead restricting coaptation of septal and anterior leaflets.  She was referred to Dr. Car in EP at the [facility].  She ultimately underwent single-chamber pacemaker and RV lead extraction and leadless pacemaker implantation on 2023.  Subsequent transesophageal echocardiogram still showed severe tricuspid regurgitation despite lead extraction.  Therefore, she was referred back to our clinic for consideration of the Clasp II trial.    Ms. Funez comes in today, as part of her work-up for entering the Clasp II trial.  She has a coronary angiogram and right heart catheterization scheduled for [facility] Dr. Chin on 2023 as well as a transesophageal echocardiogram on 2023 at [facility].     She overall continues to feel okay.  Some days are worse than others.  She has ongoing dyspnea  with exertion as well as PND and orthopnea.  She has an adjustable bed and sleeps with the head of the bed up slightly, about 10 degrees, every night.  She has not had any peripheral edema.    Her granddaughter is getting  in 2026 and hopes to be around for this so they can share a grandparent dance together.    ASSESSMENT:    1.  Hx of emergent aortic dissection repair (1/2019) c/b prolonged hospitalization and trach   2.  Chronic A-fib s/p AV node ablation and PPM in 2019  3.  Chronic diastolic heart failure, NYHA class II   4.  Severe tricuspid regurgitation now part of the Clasp II trial   5.  Mild mitral regurgitation  6.  Hypertension  7.  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  S/p leadless pacemaker implantation on 7/31/23    PLAN:     Risks and benefits of coronary angiography were discussed today including bleeding, bruising, infection, allergic reaction, kidney damage (including need for dialysis), stroke, heart attack, vascular damage, emergency open heart surgery, up to and including death. Patient indicates understanding and is agreeable to proceed.   She will stop her Coumadin on 8/24/2023 in preparation for procedure.  We also discussed risk and benefits of transesophageal echocardiogram including damage to the oral cavity, esophageal perforation, GI bleeding, pharyngeal hematoma, transient bronchospasm, transient hypoxia, arrhthymias (NSVT, transient atrial fibrillation), vomiting, hemoptysis, and complications from anesthesia. Patient understands and wishes to proceed.  She will follow-up appropriately with our valve team pending findings from above testing.         Trinh Jacobs, JOANNE, APRN, CNP  Page: [phone] (8a-5p M-F)    Orders this Visit:  No orders of the defined types were placed in this encounter.    No orders of the defined types were placed in this encounter.    There are no discontinued medications.    Today's clinic visit  "entailed:  Review of the result(s) of each unique test - echo, ALEX, lead extraction, TTE  Ordering of each unique test  Prescription drug management  40 minutes spent by me on the date of the encounter doing chart review, review of test results, interpretation of tests, patient visit, and documentation   Provider  Link to [facility] Help Grid     The level of medical decision making during this visit was of moderate complexity.           Review of Systems:     Review of Systems:  Skin:        Eyes:       ENT:       Respiratory:  Positive for shortness of breath  Cardiovascular:  palpitations;chest pain;syncope or near-syncope;cyanosis;dizziness;lightheadedness;edema;Negative Positive for;exercise intolerance  Gastroenterology:      Genitourinary:       Musculoskeletal:       Neurologic:       Psychiatric:       Heme/Lymph/Imm:       Endocrine:  Negative              Physical Exam:   Vitals: /76   Pulse 86   Ht 1.613 m (5' 3.5\")   Wt 68.9 kg (152 lb)   SpO2 96%   BMI 26.50 kg/m    Constitutional:  cooperative        Skin:  warm and dry to the touch        Head:  normocephalic        Eyes:  pupils equal and round        ENT:  no pallor or cyanosis        Neck:  JVP normal        Chest:  normal breath sounds, clear to auscultation, normal A-P diameter, normal symmetry, normal respiratory excursion, no use of accessory muscles        Cardiac: regular rhythm       grade 1;holosystolic murmur          Abdomen:  abdomen soft        Vascular: pulses full and equal                                      Extremities and Back:  no edema        Neurological:  no gross motor deficits;affect appropriate             Medications:     Current Outpatient Medications   Medication Sig Dispense Refill    Acetaminophen 325 MG CAPS Take 325-650 mg by mouth every 4 hours as needed      albuterol (PROAIR HFA/PROVENTIL HFA/VENTOLIN HFA) 108 (90 Base) MCG/ACT inhaler Inhale 2 puffs into the lungs every 6 hours as needed for shortness of " breath / dyspnea or wheezing 18 g 0    aspirin (ASA) 81 MG EC tablet Take 81 mg by mouth every 24 hours      cholecalciferol 25 MCG (1000 UT) TABS Take 1,000 Units by mouth daily       cyanocobalamin (VITAMIN B-12) 1000 MCG tablet Take 1,000 mcg by mouth daily       empagliflozin (JARDIANCE) 10 MG TABS tablet Take 1 tablet (10 mg) by mouth daily 90 tablet 3    escitalopram (LEXAPRO) 10 MG tablet Take 1 tablet (10 mg) by mouth daily 90 tablet 3    metoprolol tartrate (LOPRESSOR) 25 MG tablet Take 1 tablet (25 mg) by mouth 2 times daily 180 tablet 3    mirtazapine (REMERON) 15 MG tablet Take 1 tablet (15 mg) by mouth At Bedtime 90 tablet 3    spironolactone (ALDACTONE) 25 MG tablet Take 1 tablet (25 mg) by mouth daily 90 tablet 3    torsemide (DEMADEX) 10 MG tablet Take 4 tablets(40mg) by mouth every day 360 tablet 3    warfarin ANTICOAGULANT (COUMADIN) 2 MG tablet Take one tablet (2 mg) by mouth on Fridays; take two tablets (4 mg) all other days; or as instructed by [facility] team. (Patient taking differently: 4mg Sundays and Thursdays and 2mg by mouth rest of the week) 170 tablet 1       Family History   Problem Relation Age of Onset    Ataxia Mother     Heart Disease Father     Diabetes No family hx of     Myocardial Infarction No family hx of     Cerebrovascular Disease No family hx of     Coronary Artery Disease Early Onset No family hx of     Breast Cancer No family hx of     Colon Cancer No family hx of     Ovarian Cancer No family hx of        Social History     Socioeconomic History    Marital status:      Spouse name: Not on file    Number of children: Not on file    Years of education: Not on file    Highest education level: Not on file   Occupational History    Occupation: Retired - customer service   Tobacco Use    Smoking status: Never    Smokeless tobacco: Never   Vaping Use    Vaping Use: Never used   Substance and Sexual Activity    Alcohol use: Not Currently    Drug use: No    Sexual activity: Not  on file   Other Topics Concern    Parent/sibling w/ CABG, MI or angioplasty before 65F 55M? Not Asked   Social History Narrative    .    Lives in home with . Fully independent.    4 adult children.    8 grandchildren.    Walks 2-3x/week.     Social Determinants of Health     Financial Resource Strain: Not on file   Food Insecurity: Not on file   Transportation Needs: Not on file   Physical Activity: Not on file   Stress: Not on file   Social Connections: Not on file   Intimate Partner Violence: Not on file   Housing Stability: Not on file            Past Medical History:     Past Medical History:   Diagnosis Date    Antiplatelet or antithrombotic long-term use     Benign essential hypertension     Cardiac pacemaker in situ     Medtronic    Chronic atrial fibrillation (H)     s/p AV node ablation and PPM placement January, 2019    Chronic diastolic heart failure (H)     Chronic kidney disease, stage III (moderate) (H)     Chronic right-sided heart failure (H)     Congestive heart failure (H)     NEDA (generalized anxiety disorder)     History of aortic dissection     s/p emergent repair 2019    History of blood transfusion     no adverse reactions    Hypertension     Mild mitral regurgitation     Obesity     Severe tricuspid regurgitation               Past Surgical History:     Past Surgical History:   Procedure Laterality Date    ANGIOGRAM Right 01/04/2019    Procedure: DIAGONSTIC ANGIOGRAM OF SMA;  Surgeon: Rigo Jennings MD;  Location:  OR    BIOPSY BREAST      BYPASS GRAFT ARTERY CORONARY, REPAIR VALVE AORTIC, COMBINED N/A 01/04/2019    Procedure: AORTIC DISSECTION REPAIR WITH GRAFT- HEMASHIELD PLATINUM WOVEN DOUBLE VELOR 4 SIDE ARM VASCULAR GRAFT, D:35 X 12 X 10 X 10MM/ L:50CM;  Surgeon: Jose Winslow MD;  Location:  OR    EP ABLATION AV NODE N/A 01/11/2019    Procedure: EP Ablation AV Node;  Surgeon: Tsering Davey MD;  Location:  HEART CARDIAC CATH LAB    EP LEAD EXTRACTION   7/31/2023    EP PACEMAKER Left 01/11/2019    Procedure: EP Pacemaker;  Surgeon: Tsering Davey MD;  Location:  HEART CARDIAC CATH LAB    EP PACEMAKER LEADLESS DEVICE IMPLANT  7/31/2023    EP PACEMAKER LEADLESS DEVICE IMPLANT N/A 7/31/2023    Procedure: Pacemaker Leadless Device Implant;  Surgeon: Gaston Car MD;  Location: [facility] OR    THYROID SURGERY      benign mass removed    TRACHEOSTOMY N/A 01/25/2019    Procedure: TRACHEOSTOMY  (DR MCCLELLAND);  Surgeon: Bryson Mcclelland MD;  Location:  OR    TRANSESOPHAGEAL ECHOCARDIOGRAM INTRAOPERATIVE N/A 6/16/2023    Procedure: ECHOCARDIOGRAM,TRANSESOPHAGEAL,WITH ANESTHESIA;  Surgeon: Dennis Meier DO;  Location: [facility] OR    TUBAL LIGATION                Allergies:   Amoxicillin and Ciprofloxacin       Data:   All laboratory data reviewed:    Recent Labs   Lab Test 06/23/21  0926 06/14/21  0927 07/15/20  0929 01/28/19  0405 01/21/19  0430 01/12/19  1203 01/07/19  0430   LDL  --  67  --   --   --   --   --    HDL  --  53  --   --   --   --   --    NHDL  --  81  --   --   --   --   --    CHOL  --  134  --   --   --   --   --    TRIG  --  71  --  96 131   < >  --    NTBNP 3,163*  --  2,198*  --   --   --   --    IRON  --   --   --   --   --   --  5*   FEB  --   --   --   --   --   --  124*   IRONSAT  --   --   --   --   --   --  4*   JA  --   --   --   --   --   --  93    < > = values in this interval not displayed.       Lab Results   Component Value Date    WBC 7.2 08/02/2023    WBC 5.3 06/14/2021    RBC 4.02 08/02/2023    RBC 4.33 06/14/2021    HGB 11.7 08/02/2023    HGB 12.8 06/14/2021    HCT 38.7 08/02/2023    HCT 40.1 06/14/2021    MCV 96 08/02/2023    MCV 93 06/14/2021    MCH 29.1 08/02/2023    MCH 29.6 06/14/2021    MCHC 30.2 (L) 08/02/2023    MCHC 31.9 06/14/2021    RDW 14.2 08/02/2023    RDW 13.5 06/14/2021     08/02/2023     06/14/2021       Lab Results   Component Value Date     08/02/2023     06/23/2021     POTASSIUM 3.8 08/02/2023    POTASSIUM 3.8 12/13/2022    POTASSIUM 4.5 06/23/2021    CHLORIDE 100 08/02/2023    CHLORIDE 99 12/13/2022    CHLORIDE 104 06/23/2021    CO2 30 (H) 08/02/2023    CO2 33 (H) 12/13/2022    CO2 31 06/23/2021    ANIONGAP 11 08/02/2023    ANIONGAP 7 12/13/2022    ANIONGAP 3 06/23/2021    GLC 99 08/02/2023    GLC 96 07/31/2023    GLC 84 12/13/2022    GLC 86 06/23/2021    BUN 27.4 (H) 08/02/2023    BUN 31 (H) 12/13/2022    BUN 30 06/23/2021    CR 1.39 (H) 08/02/2023    CR 1.29 (H) 06/23/2021    GFRESTIMATED 38 (L) 08/02/2023    GFRESTIMATED 39 (L) 06/23/2021    GFRESTBLACK 46 (L) 06/23/2021    BESS 8.8 08/02/2023    BESS 9.2 06/23/2021      Lab Results   Component Value Date    AST 22 06/16/2023    AST 27 04/04/2019    ALT 12 06/16/2023    ALT 22 06/14/2021       Lab Results   Component Value Date    A1C 5.6 01/30/2019    A1C 5.3 01/04/2019       Lab Results   Component Value Date    INR 1.5 (H) 08/18/2023    INR 2.1 (H) 08/11/2023    INR 1.72 (H) 08/02/2023    INR 1.37 (H) 08/01/2023    INR 2.30 (H) 07/09/2021    INR 2.40 (H) 06/10/2021           Thank you for allowing me to participate in the care of your patient.      Sincerely,     Trinh Jacobs, [facility]  cc:   No referring provider defined for this encounter.

## 2023-08-25 NOTE — PROGRESS NOTES
ANTICOAGULATION MANAGEMENT     Priya Funez 82 year old female is on warfarin with subtherapeutic INR result. (Goal INR 2.0-3.0)    Recent labs: (last 7 days)     08/25/23  1428   INR 1.7*       ASSESSMENT     Source(s): Chart Review and Patient/Caregiver Call     Warfarin doses taken: Warfarin taken as instructed  She has a ECHO and possible angiogram scheduled for 8/29.. She states that cardiology told her to stop the warfarin after Thursday dose.  This she did.  She is very nervous about being off the warfarin . She might contact cardiology about it  Diet: No new diet changes identified  Medication/supplement changes: None noted  New illness, injury, or hospitalization: No  Signs or symptoms of bleeding or clotting: Yes: she has 2 small bruises on the top of her hand  Previous result: Subtherapeutic  Additional findings: None       PLAN     Recommended plan for no diet, medication or health factor changes affecting INR     Dosing Instructions:  hold until after angiogram on Tuesday and ask them for start up dosing  with next INR in 10 days       Summary  As of 8/25/2023      Full warfarin instructions:  8/25: Hold; 8/26: Hold; 8/27: Hold; 8/28: Hold; Otherwise 4 mg every Sun, Thu; 2 mg all other days   Next INR check:  9/5/2023               Telephone call with Priya who verbalizes understanding and agrees to plan    Lab visit scheduled    Education provided:   Please call back if any changes to your diet, medications or how you've been taking warfarin    Plan made per [facility] anticoagulation protocol    Christine Adames RN  Anticoagulation Clinic  8/25/2023    _______________________________________________________________________     Anticoagulation Episode Summary       Current INR goal:  2.0-3.0   TTR:  61.8 % (1 y)   Target end date:  Indefinite   [facility]  [facility]    Indications    Chronic atrial fibrillation (H) [I48.20]  Current use of long term anticoagulation  (Resolved) [Z79.01]             Comments:               Anticoagulation Care Providers       Provider Role Specialty Phone number    Sharmaine Burks MD Referring Internal Medicine [phone]

## 2023-08-25 NOTE — TELEPHONE ENCOUNTER
Reason for Call:  Other call back and returning call    Detailed comments: Needs to talk to INR nurse    Phone Number Patient can be reached at: Home number on file [phone] (home)    Best Time: Anytime    Can we leave a detailed message on this number? YES    Call taken on 8/25/2023 at 4:45 PM by Tavia Springer

## 2023-08-29 NOTE — PROGRESS NOTES
Care Suites Admission Nursing Note    Patient Information  Name: Priya Funez  Age: 82 year old  Reason for admission: heart cath  Care Suites arrival time: 0830    Visitor Information  Name: galdino      Patient Admission/Assessment   Pre-procedure assessment complete: Yes  If abnormal assessment/labs, provider notified: N/A  NPO: Yes  Medications held per instructions/orders: Yes  Consent: deferred  If applicable, pregnancy test status: deferred  Patient oriented to room: Yes  Education/questions answered: Yes  Plan/other: heart cath    Discharge Planning  Discharge name/phone number: galdino [phone]  Overnight post sedation caregiver: galdino  Discharge location: home    Iglesia Mabry RN

## 2023-08-29 NOTE — TELEPHONE ENCOUNTER
ANTICOAGULATION  MANAGEMENT: Discharge Review    Priyabereket Funez chart reviewed for anticoagulation continuity of care    Outpatient surgery/procedure on 8/29/23 for angiogram.    Discharge disposition: Home    Results:    Recent labs: (last 7 days)     08/25/23  1428 08/29/23  0855   INR 1.7* 1.29*     Anticoagulation inpatient management:     not applicable     Anticoagulation discharge instructions:     Warfarin dosing: home regimen continued   Bridging: No   INR goal change: No      Medication changes affecting anticoagulation: No    Additional factors affecting anticoagulation: No     PLAN     No adjustment to anticoagulation plan needed    Recommended follow up is scheduled    Anticoagulation Calendar updated    Marcelino Palomares RN

## 2023-08-29 NOTE — PROGRESS NOTES
1103 Report received from Iglesia Mabry RN.  1120 Pt returned from Cath Lab. Drowsy, but appropriate to verbal stimuli. TR band intact to right wrist.  No oozing or hematoma noted. Area soft & flat. Armboard intact. Right upper forearm soft. Right arm elevated on pillows. Tegaderm drsg CDI to right neck puncture site. No oozing or hematoma noted. Area soft & flat. Pt denies pain. Pt instructed on activity restrictions while on bedrest. Verbal understanding received from pt.  1135 Pt's  at bedside. Detailed update given.   1203 Report given to Iglesia Mabry RN.

## 2023-08-29 NOTE — PRE-PROCEDURE
GENERAL PRE-PROCEDURE:   Procedure:  Cors +/- PCI. [facility]  Date/Time:  8/29/2023 10:02 AM    Written consent obtained?: Yes    Risks and benefits: Risks, benefits and alternatives were discussed    Consent given by:  Patient  Patient states understanding of procedure being performed: Yes    Patient's understanding of procedure matches consent: Yes    Procedure consent matches procedure scheduled: Yes    Expected level of sedation:  Moderate  Appropriately NPO:  Yes  Mallampati  :  Grade 3- soft palate visible, posterior pharyngeal wall not visible  Lungs:  Lungs clear with good breath sounds bilaterally  Heart:  Systolic murmur  History & Physical reviewed:  History and physical reviewed and no updates needed  Statement of review:  I have reviewed the lab findings, diagnostic data, medications, and the plan for sedation

## 2023-08-29 NOTE — DISCHARGE INSTRUCTIONS
Resume Coumadin at prior dose this evening - 8/29      Cardiac Angiogram Discharge Instructions - Neck & Radial    After you go home:    Have an adult stay with you until tomorrow.  Drink extra fluids for 2 days.  You may resume your normal diet.  No smoking       For 24 hours - due to the sedation you received:  Relax and take it easy.  Do NOT make any important or legal decisions.  Do NOT drive or operate machines at home or at work.  Do NOT drink alcohol.    Care of Neck & Wrist Puncture Sites:    For the first 24 hrs - check the puncture site every 1-2 hours while awake.  It is normal to have soreness at the puncture site and mild tingling in your hand for up to 3 days.  Remove the bandaid after 24 hours. If there is minor oozing, apply another bandaid and remove it after 12 hours.  You may shower tomorrow. Do NOT take a bath, or use a hot tub or pool for at least 3 days. Do NOT scrub the site. Do not use lotion or powder near the puncture site.           Activity - For 2 days:    Neck site:  Avoid heavy lifting or the overuse of your shoulder.        Wrist site:  do not use your hand or arm to support your weight (such as rising from a chair)   do not bend your wrist (such as lifting a garage door).  do not lift more than 5 pounds or exercise your arm (such as tennis, golf or bowling).  Do NOT do any heavy activity such as exercise, lifting, or straining.     Bleeding:     Neck site:  If you start bleeding from the site in your neck, sit down and press gently on the site for 10 minutes.   Once bleeding stops, sit still for 2 hours.   Call [facility] as soon as you can    Wrist site:  If you start bleeding from the site in your wrist, sit down and press firmly on/above the site for 10 minutes.   Once bleeding stops, keep arm still for 2 hours.   Call [facility] as soon as you can.    Call 911 right away if you have heavy bleeding or bleeding that does not stop.      Medicines:    Take your medications, including  blood thinners, unless your provider tells you not to.    If you take Coumadin (Warfarin), have your INR checked by your provider in  3-5 days. Call your clinic to schedule this.  If you have stopped any medicines, check with your provider about when to restart them.    Follow Up Appointments:    Follow up with [facility] Nurse Practitioner at [facility] of patient preference in 7-10 days.    Call the clinic if:    You have increased pain or a large or growing hard lump around the site.  The site is red, swollen, hot or tender.  Blood or fluid is draining from the site.  You have chills or a fever greater than 101 F (38 C).  Your arm feels numb, cool or changes color.  You have hives, a rash or unusual itching.  Any questions or concerns.      [facility] [place]:    [phone] [facility] (7 days a week)

## 2023-08-29 NOTE — PROGRESS NOTES
Care Suites Discharge Nursing Note    Patient Information  Name: Priya Funez  Age: 82 year old    Discharge Education:  Discharge instructions reviewed: Yes  Additional education/resources provided: no  Patient/patient representative verbalizes understanding: Yes  Patient discharging on new medications: No  Medication education completed: N/A    Discharge Plans:   Discharge location: home  Discharge ride contacted: Yes  Approximate discharge time: 1430    Discharge Criteria:  Discharge criteria met and vital signs stable: Yes    Patient Belongs:  Patient belongings returned to patient: Yes    Iglesia Mabry RN

## 2023-08-30 NOTE — PROGRESS NOTES
HEART CARE NOTE        Thank you, Dr. Torres, for asking the [facility] team to see Priya Funez to evaluate HFpEF.    Assessment/Recommendations     1. HFpEF/RV dysfunction  Assessment / Plan  Near euvolemia on physical exam; denies HF symptoms of orthopnea, PND, fluid retention - no changes to current diuretic regimen   Patient is high risk 2/2 advanced  age, valvular disease  GDMT as detailed below; mainstay of treatment for HFpEF includes diuretics and adequate BP control (class I) and SGLT2-I (class 2a); additional medical therapy (ARNI, MRA, ARB) demonstrated less robust evidence for indication but may be considered per guideline recommendations (2b); no indication for BBlockers     Current Pharmacotherapy AHA Guideline-Directed Medical Therapy   Losartan - not started due to borderline BP ARNI/ARB   Spironolactone 25 mg  MRA   SGLT2 inhibitor: empagliflozin 10 mg daily SGLT2-I    Torsemide 30 mg in the morning and 10 mg in the afternoon daily  Loop diuretic      2. Atrial fibrillation  Assessment / Plan  S/p AVN and PPM  Currently on coumadin    3. Valvular heart disease  Assessment / Plan  Severe TR - currently under evaluation for CLASP TR trial   Diuretics and oral afterload reduction as detailed above    4. HTN  Assessment / Plan  Adequately controlled on current regimen - no changes indicated at this time    60 minutes spent reviewing prior records (including documentation, laboratory studies, cardiac testing/imaging), history and physical exam, planning, and subsequent documentation.    History of Present Illness/Subjective    Ms. Priya Funez is a 82 year old female with a PMHx significant for HFpEF, severe TR, permanent AF s/p AVN ablation with PPM implant 1/11/19, emergent aortic dissection repair 1/4/19, HTN, and obesity who presents to [facility] clinic to establish care.    Today, Mrs. Funez denies any acute cardiac events or complaints including HF symptoms; We discussed  "HF diet and lifestyle modifications including the 2 g Na and 2L fluid restrictions. She does not currently strictly adhere, but will do so moving forward. Patient was provided with the HF educational binder as well as a book to log his daily weights.   Management plan as detailed above as well as HF education by our [facility] RN.     ECG: Personally reviewed. Paced rhythm.    ECHO (personnaly Reviewed on 8/30/23):   1. The left ventricle is normal in size. Left ventricular function is  normal.The ejection fraction is 55-60%.  2. The right ventricle is mildly dilated.Mildly decreased right ventricular  systolic function  3. 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  4. Massive tricuspid regurgitation related to septal leaflet restriction from  RV pacemaker lead causing lack of coaptation. Coaptation gap is 1.2 cm. Septal  leaflet is mobile, non-calcified and non-sclerotic.     Compared to the prior study dated 6/2/2023, RV size was measure at midly  dilated on current study. Otherwise no signficant changes.    Lab results: personally reviewed August 30, 2023; notable for CKD; awaiting NTproBNP results    Medical history and pertinent documents reviewed in Care Everywhere please where applicable see details above          Physical Examination Review of Systems   /62 (BP Location: Left arm, Patient Position: Sitting, Cuff Size: Adult Regular)   Pulse 91   Resp 18   Ht 1.6 m (5' 3\")   Wt 69.9 kg (154 lb)   SpO2 94%   BMI 27.28 kg/m    Body mass index is 27.28 kg/m .  Wt Readings from Last 3 Encounters:   08/30/23 69.9 kg (154 lb)   08/30/23 69.9 kg (154 lb)   08/29/23 69.9 kg (154 lb)     General Appearance:   no distress, normal body habitus   ENT/Mouth: membranes moist, no oral lesions or bleeding gums.      EYES:  no scleral icterus, normal conjunctivae   Neck: no carotid bruits or thyromegaly   Chest/Lungs:   lungs are clear to auscultation, no rales or wheezing, equal chest wall " expansion    Cardiovascular:   Regular. Normal first and second heart sounds with +VALENTIN; no rubs, or gallops; the carotid, radial and posterior tibial pulses are intact, no JVD and trace LE edema bilaterally    Abdomen:  no organomegaly, masses, bruits, or tenderness; bowel sounds are present   Extremities: no cyanosis or clubbing   Skin: no xanthelasma, warm.    Neurologic: alert and oriented x3, calm     Psychiatric: alert and oriented x3, calm     A complete 10 systems ROS was reviewed  And is negative except what is listed in the HPI.          Medical History  Surgical History Family History Social History   Past Medical History:   Diagnosis Date    Antiplatelet or antithrombotic long-term use     Benign essential hypertension     Cardiac pacemaker in situ     Medtronic    Chronic atrial fibrillation (H)     s/p AV node ablation and PPM placement January, 2019    Chronic diastolic heart failure (H)     Chronic kidney disease, stage III (moderate) (H)     Chronic right-sided heart failure (H)     Congestive heart failure (H)     NEDA (generalized anxiety disorder)     History of aortic dissection     s/p emergent repair 2019    History of blood transfusion     no adverse reactions    Hypertension     Mild mitral regurgitation     Obesity     Severe tricuspid regurgitation     Past Surgical History:   Procedure Laterality Date    ANGIOGRAM Right 01/04/2019    Procedure: DIAGONSTIC ANGIOGRAM OF SMA;  Surgeon: Rigo Jennings MD;  Location:  OR    BIOPSY BREAST      BYPASS GRAFT ARTERY CORONARY, REPAIR VALVE AORTIC, COMBINED N/A 01/04/2019    Procedure: AORTIC DISSECTION REPAIR WITH GRAFT- HEMASHIELD PLATINUM WOVEN DOUBLE VELOR 4 SIDE ARM VASCULAR GRAFT, D:35 X 12 X 10 X 10MM/ L:50CM;  Surgeon: Jose Winslow MD;  Location:  OR    CV CORONARY ANGIOGRAM N/A 8/29/2023    Procedure: Coronary Angiogram;  Surgeon: Ar Morales MD;  Location:  HEART CARDIAC CATH LAB    CV PCI N/A 8/29/2023     Procedure: Percutaneous Coronary Intervention;  Surgeon: Ar Morales MD;  Location:  HEART CARDIAC CATH LAB    CV RIGHT HEART CATH MEASUREMENTS RECORDED N/A 8/29/2023    Procedure: Right Heart Catheterization;  Surgeon: Ar Morales MD;  Location:  HEART CARDIAC CATH LAB    EP ABLATION AV NODE N/A 01/11/2019    Procedure: EP Ablation AV Node;  Surgeon: Tsering Davey MD;  Location:  HEART CARDIAC CATH LAB    EP LEAD EXTRACTION  7/31/2023    EP PACEMAKER Left 01/11/2019    Procedure: EP Pacemaker;  Surgeon: Tsering Davey MD;  Location:  HEART CARDIAC CATH LAB    EP PACEMAKER LEADLESS DEVICE IMPLANT  7/31/2023    EP PACEMAKER LEADLESS DEVICE IMPLANT N/A 7/31/2023    Procedure: Pacemaker Leadless Device Implant;  Surgeon: Gaston Car MD;  Location: [facility] OR    THYROID SURGERY      benign mass removed    TRACHEOSTOMY N/A 01/25/2019    Procedure: TRACHEOSTOMY  (DR MCCLELLAND);  Surgeon: Bryson Mcclelland MD;  Location:  OR    TRANSESOPHAGEAL ECHOCARDIOGRAM INTRAOPERATIVE N/A 6/16/2023    Procedure: ECHOCARDIOGRAM,TRANSESOPHAGEAL,WITH ANESTHESIA;  Surgeon: Dennis Meier DO;  Location: [facility] OR    TUBAL LIGATION      no family history of premature coronary artery disease Social History     Socioeconomic History    Marital status:      Spouse name: Not on file    Number of children: Not on file    Years of education: Not on file    Highest education level: Not on file   Occupational History    Occupation: Retired - customer service   Tobacco Use    Smoking status: Never    Smokeless tobacco: Never   Vaping Use    Vaping Use: Never used   Substance and Sexual Activity    Alcohol use: Not Currently    Drug use: No    Sexual activity: Not on file   Other Topics Concern    Parent/sibling w/ CABG, MI or angioplasty before 65F 55M? Not Asked   Social History Narrative    .    Lives in home with . Fully independent.    4 adult children.    8 grandchildren.     Walks 2-3x/week.     Social Determinants of Health     Financial Resource Strain: Not on file   Food Insecurity: Not on file   Transportation Needs: Not on file   Physical Activity: Not on file   Stress: Not on file   Social Connections: Not on file   Intimate Partner Violence: Not on file   Housing Stability: Not on file           Lab Results    Chemistry/lipid CBC Cardiac Enzymes/BNP/TSH/INR   Lab Results   Component Value Date    CHOL 134 06/14/2021    HDL 53 06/14/2021    TRIG 71 06/14/2021    BUN 27.6 (H) 08/29/2023     08/29/2023    CO2 30 (H) 08/29/2023    Lab Results   Component Value Date    WBC 6.0 08/29/2023    HGB 12.8 08/29/2023    HCT 41.1 08/29/2023    MCV 95 08/29/2023     08/29/2023    Lab Results   Component Value Date     (H) 03/12/2019    INR 1.29 (H) 08/29/2023     No results found for: CKTOTAL, CKMB, TROPONINI       Weight:    Wt Readings from Last 3 Encounters:   08/30/23 69.9 kg (154 lb)   08/30/23 69.9 kg (154 lb)   08/29/23 69.9 kg (154 lb)       Allergies  Allergies   Allergen Reactions    Amoxicillin Swelling     Face and body    Ciprofloxacin Hives         Surgical History  Past Surgical History:   Procedure Laterality Date    ANGIOGRAM Right 01/04/2019    Procedure: DIAGONSTIC ANGIOGRAM OF SMA;  Surgeon: Rigo Jennings MD;  Location:  OR    BIOPSY BREAST      BYPASS GRAFT ARTERY CORONARY, REPAIR VALVE AORTIC, COMBINED N/A 01/04/2019    Procedure: AORTIC DISSECTION REPAIR WITH GRAFT- HEMASHIELD PLATINUM WOVEN DOUBLE VELOR 4 SIDE ARM VASCULAR GRAFT, D:35 X 12 X 10 X 10MM/ L:50CM;  Surgeon: Jose Winslow MD;  Location:  OR    CV CORONARY ANGIOGRAM N/A 8/29/2023    Procedure: Coronary Angiogram;  Surgeon: Ar Morales MD;  Location:  HEART CARDIAC CATH LAB    CV PCI N/A 8/29/2023    Procedure: Percutaneous Coronary Intervention;  Surgeon: Ar Morales MD;  Location:  HEART CARDIAC CATH LAB    CV RIGHT HEART CATH  MEASUREMENTS RECORDED N/A 8/29/2023    Procedure: Right Heart Catheterization;  Surgeon: Ar Morales MD;  Location:  HEART CARDIAC CATH LAB    EP ABLATION AV NODE N/A 01/11/2019    Procedure: EP Ablation AV Node;  Surgeon: Tsering Davey MD;  Location:  HEART CARDIAC CATH LAB    EP LEAD EXTRACTION  7/31/2023    EP PACEMAKER Left 01/11/2019    Procedure: EP Pacemaker;  Surgeon: Tsering Davey MD;  Location:  HEART CARDIAC CATH LAB    EP PACEMAKER LEADLESS DEVICE IMPLANT  7/31/2023    EP PACEMAKER LEADLESS DEVICE IMPLANT N/A 7/31/2023    Procedure: Pacemaker Leadless Device Implant;  Surgeon: Gaston Car MD;  Location: [facility] OR    THYROID SURGERY      benign mass removed    TRACHEOSTOMY N/A 01/25/2019    Procedure: TRACHEOSTOMY  (DR MCCLELLAND);  Surgeon: Bryson Mcclelland MD;  Location:  OR    TRANSESOPHAGEAL ECHOCARDIOGRAM INTRAOPERATIVE N/A 6/16/2023    Procedure: ECHOCARDIOGRAM,TRANSESOPHAGEAL,WITH ANESTHESIA;  Surgeon: Dennis Meier DO;  Location: [facility] OR    TUBAL LIGATION         Social History  Tobacco:   History   Smoking Status    Never   Smokeless Tobacco    Never    Alcohol:   Social History    Substance and Sexual Activity      Alcohol use: Not Currently   Illicit Drugs:   History   Drug Use No       Family History  Family History   Problem Relation Age of Onset    Ataxia Mother     Heart Disease Father     Diabetes No family hx of     Myocardial Infarction No family hx of     Cerebrovascular Disease No family hx of     Coronary Artery Disease Early Onset No family hx of     Breast Cancer No family hx of     Colon Cancer No family hx of     Ovarian Cancer No family hx of           Todd Thomas MD on 8/30/2023      cc: Sharmaine Burks

## 2023-08-30 NOTE — H&P (VIEW-ONLY)
HEART CARE NOTE        Thank you, Dr. Torres, for asking the [facility] team to see Priya Funez to evaluate HFpEF.    Assessment/Recommendations     1. HFpEF/RV dysfunction  Assessment / Plan  Near euvolemia on physical exam; denies HF symptoms of orthopnea, PND, fluid retention - no changes to current diuretic regimen   Patient is high risk 2/2 advanced  age, valvular disease  GDMT as detailed below; mainstay of treatment for HFpEF includes diuretics and adequate BP control (class I) and SGLT2-I (class 2a); additional medical therapy (ARNI, MRA, ARB) demonstrated less robust evidence for indication but may be considered per guideline recommendations (2b); no indication for BBlockers     Current Pharmacotherapy AHA Guideline-Directed Medical Therapy   Losartan - not started due to borderline BP ARNI/ARB   Spironolactone 25 mg  MRA   SGLT2 inhibitor: empagliflozin 10 mg daily SGLT2-I    Torsemide 30 mg in the morning and 10 mg in the afternoon daily  Loop diuretic      2. Atrial fibrillation  Assessment / Plan  S/p AVN and PPM  Currently on coumadin    3. Valvular heart disease  Assessment / Plan  Severe TR - currently under evaluation for CLASP TR trial   Diuretics and oral afterload reduction as detailed above    4. HTN  Assessment / Plan  Adequately controlled on current regimen - no changes indicated at this time    60 minutes spent reviewing prior records (including documentation, laboratory studies, cardiac testing/imaging), history and physical exam, planning, and subsequent documentation.    History of Present Illness/Subjective    Ms. Priya Funez is a 82 year old female with a PMHx significant for HFpEF, severe TR, permanent AF s/p AVN ablation with PPM implant 1/11/19, emergent aortic dissection repair 1/4/19, HTN, and obesity who presents to [facility] clinic to establish care.    Today, Mrs. Funez denies any acute cardiac events or complaints including HF symptoms; We discussed  "HF diet and lifestyle modifications including the 2 g Na and 2L fluid restrictions. She does not currently strictly adhere, but will do so moving forward. Patient was provided with the HF educational binder as well as a book to log his daily weights.   Management plan as detailed above as well as HF education by our [facility] RN.     ECG: Personally reviewed. Paced rhythm.    ECHO (personnaly Reviewed on 8/30/23):   1. The left ventricle is normal in size. Left ventricular function is  normal.The ejection fraction is 55-60%.  2. The right ventricle is mildly dilated.Mildly decreased right ventricular  systolic function  3. 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  4. Massive tricuspid regurgitation related to septal leaflet restriction from  RV pacemaker lead causing lack of coaptation. Coaptation gap is 1.2 cm. Septal  leaflet is mobile, non-calcified and non-sclerotic.     Compared to the prior study dated 6/2/2023, RV size was measure at midly  dilated on current study. Otherwise no signficant changes.    Lab results: personally reviewed August 30, 2023; notable for CKD; awaiting NTproBNP results    Medical history and pertinent documents reviewed in Care Everywhere please where applicable see details above          Physical Examination Review of Systems   /62 (BP Location: Left arm, Patient Position: Sitting, Cuff Size: Adult Regular)   Pulse 91   Resp 18   Ht 1.6 m (5' 3\")   Wt 69.9 kg (154 lb)   SpO2 94%   BMI 27.28 kg/m    Body mass index is 27.28 kg/m .  Wt Readings from Last 3 Encounters:   08/30/23 69.9 kg (154 lb)   08/30/23 69.9 kg (154 lb)   08/29/23 69.9 kg (154 lb)     General Appearance:   no distress, normal body habitus   ENT/Mouth: membranes moist, no oral lesions or bleeding gums.      EYES:  no scleral icterus, normal conjunctivae   Neck: no carotid bruits or thyromegaly   Chest/Lungs:   lungs are clear to auscultation, no rales or wheezing, equal chest wall " expansion    Cardiovascular:   Regular. Normal first and second heart sounds with +VALENTIN; no rubs, or gallops; the carotid, radial and posterior tibial pulses are intact, no JVD and trace LE edema bilaterally    Abdomen:  no organomegaly, masses, bruits, or tenderness; bowel sounds are present   Extremities: no cyanosis or clubbing   Skin: no xanthelasma, warm.    Neurologic: alert and oriented x3, calm     Psychiatric: alert and oriented x3, calm     A complete 10 systems ROS was reviewed  And is negative except what is listed in the HPI.          Medical History  Surgical History Family History Social History   Past Medical History:   Diagnosis Date    Antiplatelet or antithrombotic long-term use     Benign essential hypertension     Cardiac pacemaker in situ     Medtronic    Chronic atrial fibrillation (H)     s/p AV node ablation and PPM placement January, 2019    Chronic diastolic heart failure (H)     Chronic kidney disease, stage III (moderate) (H)     Chronic right-sided heart failure (H)     Congestive heart failure (H)     NEDA (generalized anxiety disorder)     History of aortic dissection     s/p emergent repair 2019    History of blood transfusion     no adverse reactions    Hypertension     Mild mitral regurgitation     Obesity     Severe tricuspid regurgitation     Past Surgical History:   Procedure Laterality Date    ANGIOGRAM Right 01/04/2019    Procedure: DIAGONSTIC ANGIOGRAM OF SMA;  Surgeon: Rigo Jennings MD;  Location:  OR    BIOPSY BREAST      BYPASS GRAFT ARTERY CORONARY, REPAIR VALVE AORTIC, COMBINED N/A 01/04/2019    Procedure: AORTIC DISSECTION REPAIR WITH GRAFT- HEMASHIELD PLATINUM WOVEN DOUBLE VELOR 4 SIDE ARM VASCULAR GRAFT, D:35 X 12 X 10 X 10MM/ L:50CM;  Surgeon: Jose Winslow MD;  Location:  OR    CV CORONARY ANGIOGRAM N/A 8/29/2023    Procedure: Coronary Angiogram;  Surgeon: Ar Morales MD;  Location:  HEART CARDIAC CATH LAB    CV PCI N/A 8/29/2023     Procedure: Percutaneous Coronary Intervention;  Surgeon: Ar Morales MD;  Location:  HEART CARDIAC CATH LAB    CV RIGHT HEART CATH MEASUREMENTS RECORDED N/A 8/29/2023    Procedure: Right Heart Catheterization;  Surgeon: Ar Morales MD;  Location:  HEART CARDIAC CATH LAB    EP ABLATION AV NODE N/A 01/11/2019    Procedure: EP Ablation AV Node;  Surgeon: Tsering Davey MD;  Location:  HEART CARDIAC CATH LAB    EP LEAD EXTRACTION  7/31/2023    EP PACEMAKER Left 01/11/2019    Procedure: EP Pacemaker;  Surgeon: Tsering Davey MD;  Location:  HEART CARDIAC CATH LAB    EP PACEMAKER LEADLESS DEVICE IMPLANT  7/31/2023    EP PACEMAKER LEADLESS DEVICE IMPLANT N/A 7/31/2023    Procedure: Pacemaker Leadless Device Implant;  Surgeon: Gaston Car MD;  Location: [facility] OR    THYROID SURGERY      benign mass removed    TRACHEOSTOMY N/A 01/25/2019    Procedure: TRACHEOSTOMY  (DR MCCLELLAND);  Surgeon: Bryson Mcclelland MD;  Location:  OR    TRANSESOPHAGEAL ECHOCARDIOGRAM INTRAOPERATIVE N/A 6/16/2023    Procedure: ECHOCARDIOGRAM,TRANSESOPHAGEAL,WITH ANESTHESIA;  Surgeon: Dennis Meier DO;  Location: [facility] OR    TUBAL LIGATION      no family history of premature coronary artery disease Social History     Socioeconomic History    Marital status:      Spouse name: Not on file    Number of children: Not on file    Years of education: Not on file    Highest education level: Not on file   Occupational History    Occupation: Retired - customer service   Tobacco Use    Smoking status: Never    Smokeless tobacco: Never   Vaping Use    Vaping Use: Never used   Substance and Sexual Activity    Alcohol use: Not Currently    Drug use: No    Sexual activity: Not on file   Other Topics Concern    Parent/sibling w/ CABG, MI or angioplasty before 65F 55M? Not Asked   Social History Narrative    .    Lives in home with . Fully independent.    4 adult children.    8 grandchildren.     Walks 2-3x/week.     Social Determinants of Health     Financial Resource Strain: Not on file   Food Insecurity: Not on file   Transportation Needs: Not on file   Physical Activity: Not on file   Stress: Not on file   Social Connections: Not on file   Intimate Partner Violence: Not on file   Housing Stability: Not on file           Lab Results    Chemistry/lipid CBC Cardiac Enzymes/BNP/TSH/INR   Lab Results   Component Value Date    CHOL 134 06/14/2021    HDL 53 06/14/2021    TRIG 71 06/14/2021    BUN 27.6 (H) 08/29/2023     08/29/2023    CO2 30 (H) 08/29/2023    Lab Results   Component Value Date    WBC 6.0 08/29/2023    HGB 12.8 08/29/2023    HCT 41.1 08/29/2023    MCV 95 08/29/2023     08/29/2023    Lab Results   Component Value Date     (H) 03/12/2019    INR 1.29 (H) 08/29/2023     No results found for: CKTOTAL, CKMB, TROPONINI       Weight:    Wt Readings from Last 3 Encounters:   08/30/23 69.9 kg (154 lb)   08/30/23 69.9 kg (154 lb)   08/29/23 69.9 kg (154 lb)       Allergies  Allergies   Allergen Reactions    Amoxicillin Swelling     Face and body    Ciprofloxacin Hives         Surgical History  Past Surgical History:   Procedure Laterality Date    ANGIOGRAM Right 01/04/2019    Procedure: DIAGONSTIC ANGIOGRAM OF SMA;  Surgeon: Rigo Jennings MD;  Location:  OR    BIOPSY BREAST      BYPASS GRAFT ARTERY CORONARY, REPAIR VALVE AORTIC, COMBINED N/A 01/04/2019    Procedure: AORTIC DISSECTION REPAIR WITH GRAFT- HEMASHIELD PLATINUM WOVEN DOUBLE VELOR 4 SIDE ARM VASCULAR GRAFT, D:35 X 12 X 10 X 10MM/ L:50CM;  Surgeon: Jose Winslow MD;  Location:  OR    CV CORONARY ANGIOGRAM N/A 8/29/2023    Procedure: Coronary Angiogram;  Surgeon: Ar Morales MD;  Location:  HEART CARDIAC CATH LAB    CV PCI N/A 8/29/2023    Procedure: Percutaneous Coronary Intervention;  Surgeon: Ar Morales MD;  Location:  HEART CARDIAC CATH LAB    CV RIGHT HEART CATH  MEASUREMENTS RECORDED N/A 8/29/2023    Procedure: Right Heart Catheterization;  Surgeon: Ar Morales MD;  Location:  HEART CARDIAC CATH LAB    EP ABLATION AV NODE N/A 01/11/2019    Procedure: EP Ablation AV Node;  Surgeon: Tsering Davey MD;  Location:  HEART CARDIAC CATH LAB    EP LEAD EXTRACTION  7/31/2023    EP PACEMAKER Left 01/11/2019    Procedure: EP Pacemaker;  Surgeon: Tsering Davey MD;  Location:  HEART CARDIAC CATH LAB    EP PACEMAKER LEADLESS DEVICE IMPLANT  7/31/2023    EP PACEMAKER LEADLESS DEVICE IMPLANT N/A 7/31/2023    Procedure: Pacemaker Leadless Device Implant;  Surgeon: Gaston Car MD;  Location: [facility] OR    THYROID SURGERY      benign mass removed    TRACHEOSTOMY N/A 01/25/2019    Procedure: TRACHEOSTOMY  (DR MCCLELLAND);  Surgeon: Bryson Mcclelland MD;  Location:  OR    TRANSESOPHAGEAL ECHOCARDIOGRAM INTRAOPERATIVE N/A 6/16/2023    Procedure: ECHOCARDIOGRAM,TRANSESOPHAGEAL,WITH ANESTHESIA;  Surgeon: Dennis Meier DO;  Location: [facility] OR    TUBAL LIGATION         Social History  Tobacco:   History   Smoking Status    Never   Smokeless Tobacco    Never    Alcohol:   Social History    Substance and Sexual Activity      Alcohol use: Not Currently   Illicit Drugs:   History   Drug Use No       Family History  Family History   Problem Relation Age of Onset    Ataxia Mother     Heart Disease Father     Diabetes No family hx of     Myocardial Infarction No family hx of     Cerebrovascular Disease No family hx of     Coronary Artery Disease Early Onset No family hx of     Breast Cancer No family hx of     Colon Cancer No family hx of     Ovarian Cancer No family hx of           Todd Thomas MD on 8/30/2023      cc: Sharmaine Burks

## 2023-08-30 NOTE — LETTER
"8/30/2023    Sharmaine Burks MD  [address] [facility] [address]    RE: Priya Funez       Dear Colleague,     I had the pleasure of seeing Priya Funez in the [facility].  CLASP II TR Screening/Baseline    Purpose: Sagastume OZZIE TrAnScatheter Valve RePair System Pivotal Clinical Trial (CLASP II TR): A prospective, multicenter, randomized, controlled pivotal trial to evaluate the safety and effectiveness of transcatheter tricuspid valve repair with the Sagastume OZZIE Transcatheter Valve Repair System and optimal medical therapy (OMT) compared to OMT alone in patients with tricuspid regurgitation.       Priya Funez was seen in clinic today for the CLASP II TR Screening/Baseline visit.    Demography  Age 82 year old    Gender female    Ethnicity White   Race White      NYHA: ___    /62 (BP Location: Left arm, Patient Position: Sitting, Cuff Size: Adult Large)   Pulse 91   Temp 96.9  F (36.1  C) (Oral)   Resp 18   Ht 1.6 m (5' 3\")   Wt 69.9 kg (154 lb)   SpO2 94%   BMI 27.28 kg/m      Current Outpatient Medications   Medication    Acetaminophen 325 MG CAPS    aspirin (ASA) 81 MG EC tablet    cholecalciferol 25 MCG (1000 UT) TABS    cyanocobalamin (VITAMIN B-12) 1000 MCG tablet    empagliflozin (JARDIANCE) 10 MG TABS tablet    escitalopram (LEXAPRO) 10 MG tablet    metoprolol tartrate (LOPRESSOR) 25 MG tablet    mirtazapine (REMERON) 15 MG tablet    spironolactone (ALDACTONE) 25 MG tablet    torsemide (DEMADEX) 10 MG tablet    warfarin ANTICOAGULANT (COUMADIN) 2 MG tablet    warfarin ANTICOAGULANT (COUMADIN) 4 MG tablet    albuterol (PROAIR HFA/PROVENTIL HFA/VENTOLIN HFA) 108 (90 Base) MCG/ACT inhaler     No current facility-administered medications for this visit.           Cardiovascular Medical History Part 1  Tricuspid Valve Disease Etiology (primary, secondary, mixed, pacer related, indeterminate)  Secondary    Number of Hospitalization for heart failure in the " last 12 months 0   Number of days hospitalized for heart failure in the last 12 months 0   Date of last hospitalization for heart failure within the last 12 months 0   Aneurysm  No   Angina No   Atrial Septal Defect No   Ventricular Septal Defect No   Cardiac Valve Disease (severe stenosis or regurgitation -- what valves)  Yes: TR 4+,   Cardiomyopathy -- specify  No   Carotid artery disease (greater than 50%) No   Cardiogenic Shock (if yes, date) No   Coronary Artery disease (greater than 50%) No   Deep vein thrombosis (DVT)(If yes, date) No   Dyslipidemia or hyperlipidemia No   Endocarditis (If yes date and date resolved) No   Hypotension (chronic, systolic BP <90 mmHg or diastolic < 60 mmHg) No   Hypertension (treated) Yes: controlled   Pulmonary Hypertension within past year (PASP) Yes        - If yes, PASP within past year        - Measured by what?   RHC: 33mmHg     Cardiovascular Medical History Part 2  Myocardial infarction (If yes, date -- including STEMI, NSTEMI, or UNK) No   Peripheral Artery disease No   Pulmonary Embolism (if yes date) No   Rheumatic Heart Disease No   Stroke (if yes, etiology and date) No   Transient Ischemic Attack (if yes, date) No   Atrial fibrillation (If yes, specify) Yes: permanent   Atrial Flutter  Yes   Bradycardia (less than 60bpm) No   Ventricular tachycardia or fibrillation No   Conduction Defects:     1st degree AV Block No   2nd degree AV Block No   3rd degree AV block (complete heart block) No   LBBB No   RBBB No   Bifascicular block No   Long QT syndrome No   Any other significant arrhythmia/conduction defects No   Other relevant cardiovascular conditions No       Cardiovascular Interventions and Surgical History Part 1  Carotid artery stenting (if yes, date) No   Pacemaker/ICD (If yes, type and date) Yes: 1/11/2019   Leadless pacemaker or ICD  Yes: 7/31/2023   Cardiac Resynchronization therapy (CRT) (If yes, date) No   PCI/Stent (if yes location and date) No   Coronary  Artery Bypass graft (CABG) Surgery (If yes locations and date) Yes: Aortic dissection   Pulmonary Artery Pressure Monitoring Device     No      Cardiovascular Interventions and Surgical History Part 2  Heart Transplant  No   Is the patient currently listed for a heart transplant?  No   Mitral Valve Surgery/Intervention (If yes, specify and date) No   Aortic Valve Surgery/Intervention (If yes, specify and date) Yes: aortic dissection 1/2019   Tricuspid Valve Surgery/Intervention (If yes, specify and date) No   Pulmonic Valve Surgery/Intervention (If yes, specify and date) No   Catheter ablation, direct current cardioversion  or other interventional EP procedure Yes: AV Node Ablation 1/111/2019   ASD Closure No   VSD Closure No   IVC Filter No   TEVAR or EVAR No   MAZE or similar procedure No   Other relevant cardiovascular procedures or surgeries? (if yes, specify) Yes: AV node ablation/11/2019     Non-Cardiovascular Medical History  Ascites  No   Hospitalization and/or ED visit requiring paracentesis in the last 12 months? No   Chronic Obstructive Pulmonary Disease (COPD) (If Yes, Severity)  No   Sleep Apnea No   Other chronic lung disease (if yes, specify) No   Home oxygen use (if yes, specify) No   Pneumonia (if yes, recent or remote) No   Diabetes (if yes, type) No   Hyperthyroidism  No   Hypothyroidism No   Renal Insuffiencey or failure (if yes, stage, dialysis?) No   Gastrointestinal or esophogeal bleeding (if yes, date) No   Cirrhosis (If yes, child-Rgigins score or MELD Score if available) No   Other liver diease (if yes, specify) No   Cancer/Malignancy (If yes, type, chest radiation and cancer status) No   Coagulopathy (bleeding or clotting disorder, if yes specify) No   Chronic anemia (<= 9 g/dL) {No   Thrombocytopenia: moderate or greater (Plt <=100,000/uL) No   Dementia (If yes, severity) No   Autoimmune disorder No   Immunocompromised No   History of Falls?  No   COVID-19 Yes: April 2023   Other non  cardiovascular conditions (if yes, specify) No   Tobacco use (former, current or never) Never   Alcohol consumption (>1 drink per day in past 12 months) No   Illicit or recreatational drug use (no, recent or remote) No       Plan: She is seeing Dr. Thomas following my visit. She has a repeat ALEX and TTE scheduled for 9/8/23. She is seeing the CV surgeon on 9/11/23. She stated that she will get the dental clearance taken care of.      Sierra R. Behr-Holewinski     Thank you for allowing me to participate in the care of your patient.      Sincerely,     Sierra R. Behr-Holewinski     [facility]  cc:   No referring provider defined for this encounter.

## 2023-08-30 NOTE — PROGRESS NOTES
"CLASP II TR Screening/Baseline    Purpose: Sagastume OZZIE TrAnScatheter Valve RePair System Pivotal Clinical Trial (CLASP II TR): A prospective, multicenter, randomized, controlled pivotal trial to evaluate the safety and effectiveness of transcatheter tricuspid valve repair with the Sagastume OZZIE Transcatheter Valve Repair System and optimal medical therapy (OMT) compared to OMT alone in patients with tricuspid regurgitation.       Priya Funez was seen in clinic today for the CLASP II TR Screening/Baseline visit.    Demography  Age 82 year old    Gender female    Ethnicity White   Race White      NYHA: review Dr. Thomas's note please    /62 (BP Location: Left arm, Patient Position: Sitting, Cuff Size: Adult Large)   Pulse 91   Temp 96.9  F (36.1  C) (Oral)   Resp 18   Ht 1.6 m (5' 3\")   Wt 69.9 kg (154 lb)   SpO2 94%   BMI 27.28 kg/m      Current Outpatient Medications   Medication    Acetaminophen 325 MG CAPS    aspirin (ASA) 81 MG EC tablet    cholecalciferol 25 MCG (1000 UT) TABS    cyanocobalamin (VITAMIN B-12) 1000 MCG tablet    empagliflozin (JARDIANCE) 10 MG TABS tablet    escitalopram (LEXAPRO) 10 MG tablet    metoprolol tartrate (LOPRESSOR) 25 MG tablet    mirtazapine (REMERON) 15 MG tablet    spironolactone (ALDACTONE) 25 MG tablet    torsemide (DEMADEX) 10 MG tablet    warfarin ANTICOAGULANT (COUMADIN) 2 MG tablet    warfarin ANTICOAGULANT (COUMADIN) 4 MG tablet    albuterol (PROAIR HFA/PROVENTIL HFA/VENTOLIN HFA) 108 (90 Base) MCG/ACT inhaler     No current facility-administered medications for this visit.           Cardiovascular Medical History Part 1  Tricuspid Valve Disease Etiology (primary, secondary, mixed, pacer related, indeterminate)  Secondary    Number of Hospitalization for heart failure in the last 12 months 0   Number of days hospitalized for heart failure in the last 12 months 0   Date of last hospitalization for heart failure within the last 12 months 0   Aneurysm  " No   Angina No   Atrial Septal Defect No   Ventricular Septal Defect No   Cardiac Valve Disease (severe stenosis or regurgitation -- what valves)  Yes: TR 4+,   Cardiomyopathy -- specify  No   Carotid artery disease (greater than 50%) No   Cardiogenic Shock (if yes, date) No   Coronary Artery disease (greater than 50%) No   Deep vein thrombosis (DVT)(If yes, date) No   Dyslipidemia or hyperlipidemia No   Endocarditis (If yes date and date resolved) No   Hypotension (chronic, systolic BP <90 mmHg or diastolic < 60 mmHg) No   Hypertension (treated) Yes: controlled   Pulmonary Hypertension within past year (PASP) Yes        - If yes, PASP within past year        - Measured by what?   RHC: 33mmHg     Cardiovascular Medical History Part 2  Myocardial infarction (If yes, date -- including STEMI, NSTEMI, or UNK) No   Peripheral Artery disease No   Pulmonary Embolism (if yes date) No   Rheumatic Heart Disease No   Stroke (if yes, etiology and date) No   Transient Ischemic Attack (if yes, date) No   Atrial fibrillation (If yes, specify) Yes: permanent   Atrial Flutter  Yes   Bradycardia (less than 60bpm) No   Ventricular tachycardia or fibrillation No   Conduction Defects:     1st degree AV Block No   2nd degree AV Block No   3rd degree AV block (complete heart block) No   LBBB No   RBBB No   Bifascicular block No   Long QT syndrome No   Any other significant arrhythmia/conduction defects No   Other relevant cardiovascular conditions No       Cardiovascular Interventions and Surgical History Part 1  Carotid artery stenting (if yes, date) No   Pacemaker/ICD (If yes, type and date) Yes: 1/11/2019   Leadless pacemaker or ICD  Yes: 7/31/2023   Cardiac Resynchronization therapy (CRT) (If yes, date) No   PCI/Stent (if yes location and date) No   Coronary Artery Bypass graft (CABG) Surgery (If yes locations and date) Yes: Aortic dissection   Pulmonary Artery Pressure Monitoring Device     No      Cardiovascular Interventions and  Surgical History Part 2  Heart Transplant  No   Is the patient currently listed for a heart transplant?  No   Mitral Valve Surgery/Intervention (If yes, specify and date) No   Aortic Valve Surgery/Intervention (If yes, specify and date) Yes: aortic dissection 1/2019   Tricuspid Valve Surgery/Intervention (If yes, specify and date) No   Pulmonic Valve Surgery/Intervention (If yes, specify and date) No   Catheter ablation, direct current cardioversion  or other interventional EP procedure Yes: AV Node Ablation 1/111/2019   ASD Closure No   VSD Closure No   IVC Filter No   TEVAR or EVAR No   MAZE or similar procedure No   Other relevant cardiovascular procedures or surgeries? (if yes, specify) Yes: AV node ablation/11/2019     Non-Cardiovascular Medical History  Ascites  No   Hospitalization and/or ED visit requiring paracentesis in the last 12 months? No   Chronic Obstructive Pulmonary Disease (COPD) (If Yes, Severity)  No   Sleep Apnea No   Other chronic lung disease (if yes, specify) No   Home oxygen use (if yes, specify) No   Pneumonia (if yes, recent or remote) No   Diabetes (if yes, type) No   Hyperthyroidism  No   Hypothyroidism No   Renal Insuffiencey or failure (if yes, stage, dialysis?) No   Gastrointestinal or esophogeal bleeding (if yes, date) No   Cirrhosis (If yes, child-Riggins score or MELD Score if available) No   Other liver diease (if yes, specify) No   Cancer/Malignancy (If yes, type, chest radiation and cancer status) No   Coagulopathy (bleeding or clotting disorder, if yes specify) No   Chronic anemia (<= 9 g/dL) {No   Thrombocytopenia: moderate or greater (Plt <=100,000/uL) No   Dementia (If yes, severity) No   Autoimmune disorder No   Immunocompromised No   History of Falls?  No   COVID-19 Yes: April 2023   Other non cardiovascular conditions (if yes, specify) No   Tobacco use (former, current or never) Never   Alcohol consumption (>1 drink per day in past 12 months) No   Illicit or recreatational  drug use (no, recent or remote) No       Plan: She is seeing Dr. Thomas following my visit. She has a repeat ALEX and TTE scheduled for 9/8/23. She is seeing the CV surgeon on 9/11/23. She stated that she will get the dental clearance taken care of.      Sierra R. Behr-Holewinski

## 2023-08-30 NOTE — LETTER
8/30/2023    Sharmaine Burks MD  [address] [facility] [address]    RE: rPiya Funez       Dear Colleague,     I had the pleasure of seeing Priya Funez in the [facility].  HEART CARE NOTE        Thank you, Dr. Torres, for asking the [facility] Care team to see Priya Funez to evaluate HFpEF.    Assessment/Recommendations     1. HFpEF/RV dysfunction  Assessment / Plan  Near euvolemia on physical exam; denies HF symptoms of orthopnea, PND, fluid retention - no changes to current diuretic regimen   Patient is high risk 2/2 advanced  age, valvular disease  GDMT as detailed below; mainstay of treatment for HFpEF includes diuretics and adequate BP control (class I) and SGLT2-I (class 2a); additional medical therapy (ARNI, MRA, ARB) demonstrated less robust evidence for indication but may be considered per guideline recommendations (2b); no indication for BBlockers     Current Pharmacotherapy AHA Guideline-Directed Medical Therapy   Losartan - not started due to borderline BP ARNI/ARB   Spironolactone 25 mg  MRA   SGLT2 inhibitor: empagliflozin 10 mg daily SGLT2-I    Torsemide 30 mg in the morning and 10 mg in the afternoon daily  Loop diuretic      2. Atrial fibrillation  Assessment / Plan  S/p AVN and PPM  Currently on coumadin    3. Valvular heart disease  Assessment / Plan  Severe TR - currently under evaluation for CLASP TR trial   Diuretics and oral afterload reduction as detailed above    4. HTN  Assessment / Plan  Adequately controlled on current regimen - no changes indicated at this time    60 minutes spent reviewing prior records (including documentation, laboratory studies, cardiac testing/imaging), history and physical exam, planning, and subsequent documentation.    History of Present Illness/Subjective    Ms. Priya Funez is a 82 year old female with a PMHx significant for HFpEF, severe TR, permanent AF s/p AVN ablation with PPM implant 1/11/19,  "emergent aortic dissection repair 1/4/19, HTN, and obesity who presents to C[facility] clinic to establish care.    Today, Mrs. Funez denies any acute cardiac events or complaints including HF symptoms; We discussed HF diet and lifestyle modifications including the 2 g Na and 2L fluid restrictions. She does not currently strictly adhere, but will do so moving forward. Patient was provided with the HF educational binder as well as a book to log his daily weights.   Management plan as detailed above as well as HF education by our CORE RN.     ECG: Personally reviewed. Paced rhythm.    ECHO (personnaly Reviewed on 8/30/23):   1. The left ventricle is normal in size. Left ventricular function is  normal.The ejection fraction is 55-60%.  2. The right ventricle is mildly dilated.Mildly decreased right ventricular  systolic function  3. 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  4. Massive tricuspid regurgitation related to septal leaflet restriction from  RV pacemaker lead causing lack of coaptation. Coaptation gap is 1.2 cm. Septal  leaflet is mobile, non-calcified and non-sclerotic.     Compared to the prior study dated 6/2/2023, RV size was measure at midly  dilated on current study. Otherwise no signficant changes.    Lab results: personally reviewed August 30, 2023; notable for CKD; awaiting NTproBNP results    Medical history and pertinent documents reviewed in Care Everywhere please where applicable see details above          Physical Examination Review of Systems   /62 (BP Location: Left arm, Patient Position: Sitting, Cuff Size: Adult Regular)   Pulse 91   Resp 18   Ht 1.6 m (5' 3\")   Wt 69.9 kg (154 lb)   SpO2 94%   BMI 27.28 kg/m    Body mass index is 27.28 kg/m .  Wt Readings from Last 3 Encounters:   08/30/23 69.9 kg (154 lb)   08/30/23 69.9 kg (154 lb)   08/29/23 69.9 kg (154 lb)     General Appearance:   no distress, normal body habitus   ENT/Mouth: membranes moist, no oral " lesions or bleeding gums.      EYES:  no scleral icterus, normal conjunctivae   Neck: no carotid bruits or thyromegaly   Chest/Lungs:   lungs are clear to auscultation, no rales or wheezing, equal chest wall expansion    Cardiovascular:   Regular. Normal first and second heart sounds with +VALENTIN; no rubs, or gallops; the carotid, radial and posterior tibial pulses are intact, no JVD and trace LE edema bilaterally    Abdomen:  no organomegaly, masses, bruits, or tenderness; bowel sounds are present   Extremities: no cyanosis or clubbing   Skin: no xanthelasma, warm.    Neurologic: alert and oriented x3, calm     Psychiatric: alert and oriented x3, calm     A complete 10 systems ROS was reviewed  And is negative except what is listed in the HPI.          Medical History  Surgical History Family History Social History   Past Medical History:   Diagnosis Date    Antiplatelet or antithrombotic long-term use     Benign essential hypertension     Cardiac pacemaker in situ     Medtronic    Chronic atrial fibrillation (H)     s/p AV node ablation and PPM placement January, 2019    Chronic diastolic heart failure (H)     Chronic kidney disease, stage III (moderate) (H)     Chronic right-sided heart failure (H)     Congestive heart failure (H)     NEDA (generalized anxiety disorder)     History of aortic dissection     s/p emergent repair 2019    History of blood transfusion     no adverse reactions    Hypertension     Mild mitral regurgitation     Obesity     Severe tricuspid regurgitation     Past Surgical History:   Procedure Laterality Date    ANGIOGRAM Right 01/04/2019    Procedure: DIAGONSTIC ANGIOGRAM OF SMA;  Surgeon: Rigo Jennings MD;  Location: [facility] OR    BIOPSY BREAST      BYPASS GRAFT ARTERY CORONARY, REPAIR VALVE AORTIC, COMBINED N/A 01/04/2019    Procedure: AORTIC DISSECTION REPAIR WITH GRAFT- HEMASHIELD PLATINUM WOVEN DOUBLE VELOR 4 SIDE ARM VASCULAR GRAFT, D:35 X 12 X 10 X 10MM/ L:50CM;  Surgeon: Nayla  Jose Ag MD;  Location:  OR    CV CORONARY ANGIOGRAM N/A 8/29/2023    Procedure: Coronary Angiogram;  Surgeon: Ar Morales MD;  Location:  HEART CARDIAC CATH LAB    CV PCI N/A 8/29/2023    Procedure: Percutaneous Coronary Intervention;  Surgeon: Ar Morales MD;  Location:  HEART CARDIAC CATH LAB    CV RIGHT HEART CATH MEASUREMENTS RECORDED N/A 8/29/2023    Procedure: Right Heart Catheterization;  Surgeon: Ar Morales MD;  Location:  HEART CARDIAC CATH LAB    EP ABLATION AV NODE N/A 01/11/2019    Procedure: EP Ablation AV Node;  Surgeon: Tsering Davey MD;  Location:  HEART CARDIAC CATH LAB    EP LEAD EXTRACTION  7/31/2023    EP PACEMAKER Left 01/11/2019    Procedure: EP Pacemaker;  Surgeon: Tsering Davey MD;  Location:  HEART CARDIAC CATH LAB    EP PACEMAKER LEADLESS DEVICE IMPLANT  7/31/2023    EP PACEMAKER LEADLESS DEVICE IMPLANT N/A 7/31/2023    Procedure: Pacemaker Leadless Device Implant;  Surgeon: Gaston Car MD;  Location: [facility] OR    THYROID SURGERY      benign mass removed    TRACHEOSTOMY N/A 01/25/2019    Procedure: TRACHEOSTOMY  (DR MCCLELLAND);  Surgeon: Bryson Mcclelland MD;  Location:  OR    TRANSESOPHAGEAL ECHOCARDIOGRAM INTRAOPERATIVE N/A 6/16/2023    Procedure: ECHOCARDIOGRAM,TRANSESOPHAGEAL,WITH ANESTHESIA;  Surgeon: Dennis Meier DO;  [facility] [facility]    TUBAL LIGATION      no family history of premature coronary artery disease Social History     Socioeconomic History    Marital status:      Spouse name: Not on file    Number of children: Not on file    Years of education: Not on file    Highest education level: Not on file   Occupational History    Occupation: Retired - customer service   Tobacco Use    Smoking status: Never    Smokeless tobacco: Never   Vaping Use    Vaping Use: Never used   Substance and Sexual Activity    Alcohol use: Not Currently    Drug use: No    Sexual activity: Not on file   Other Topics  Concern    Parent/sibling w/ CABG, MI or angioplasty before 65F 55M? Not Asked   Social History Narrative    .    Lives in home with . Fully independent.    4 adult children.    8 grandchildren.    Walks 2-3x/week.     Social Determinants of Health     Financial Resource Strain: Not on file   Food Insecurity: Not on file   Transportation Needs: Not on file   Physical Activity: Not on file   Stress: Not on file   Social Connections: Not on file   Intimate Partner Violence: Not on file   Housing Stability: Not on file           Lab Results    Chemistry/lipid CBC Cardiac Enzymes/BNP/TSH/INR   Lab Results   Component Value Date    CHOL 134 06/14/2021    HDL 53 06/14/2021    TRIG 71 06/14/2021    BUN 27.6 (H) 08/29/2023     08/29/2023    CO2 30 (H) 08/29/2023    Lab Results   Component Value Date    WBC 6.0 08/29/2023    HGB 12.8 08/29/2023    HCT 41.1 08/29/2023    MCV 95 08/29/2023     08/29/2023    Lab Results   Component Value Date     (H) 03/12/2019    INR 1.29 (H) 08/29/2023     No results found for: CKTOTAL, CKMB, TROPONINI       Weight:    Wt Readings from Last 3 Encounters:   08/30/23 69.9 kg (154 lb)   08/30/23 69.9 kg (154 lb)   08/29/23 69.9 kg (154 lb)       Allergies  Allergies   Allergen Reactions    Amoxicillin Swelling     Face and body    Ciprofloxacin Hives         Surgical History  Past Surgical History:   Procedure Laterality Date    ANGIOGRAM Right 01/04/2019    Procedure: DIAGONSTIC ANGIOGRAM OF SMA;  Surgeon: Rigo Jennings MD;  Location:  OR    BIOPSY BREAST      BYPASS GRAFT ARTERY CORONARY, REPAIR VALVE AORTIC, COMBINED N/A 01/04/2019    Procedure: AORTIC DISSECTION REPAIR WITH GRAFT- HEMASHIELD PLATINUM WOVEN DOUBLE VELOR 4 SIDE ARM VASCULAR GRAFT, D:35 X 12 X 10 X 10MM/ L:50CM;  Surgeon: Jose Winslow MD;  Location:  OR    CV CORONARY ANGIOGRAM N/A 8/29/2023    Procedure: Coronary Angiogram;  Surgeon: Ar Morales MD;   Location:  HEART CARDIAC CATH LAB    CV PCI N/A 8/29/2023    Procedure: Percutaneous Coronary Intervention;  Surgeon: Ar Morales MD;  Location:  HEART CARDIAC CATH LAB    CV RIGHT HEART CATH MEASUREMENTS RECORDED N/A 8/29/2023    Procedure: Right Heart Catheterization;  Surgeon: Ar Morales MD;  Location:  HEART CARDIAC CATH LAB    EP ABLATION AV NODE N/A 01/11/2019    Procedure: EP Ablation AV Node;  Surgeon: Tsering Davey MD;  Location:  HEART CARDIAC CATH LAB    EP LEAD EXTRACTION  7/31/2023    EP PACEMAKER Left 01/11/2019    Procedure: EP Pacemaker;  Surgeon: Tsering Davey MD;  Location:  HEART CARDIAC CATH LAB    EP PACEMAKER LEADLESS DEVICE IMPLANT  7/31/2023    EP PACEMAKER LEADLESS DEVICE IMPLANT N/A 7/31/2023    Procedure: Pacemaker Leadless Device Implant;  Surgeon: Gaston Car MD;  Location: [facility] OR    THYROID SURGERY      benign mass removed    TRACHEOSTOMY N/A 01/25/2019    Procedure: TRACHEOSTOMY  (DR MCCLELLAND);  Surgeon: Bryson Mcclelland MD;  Location:  OR    TRANSESOPHAGEAL ECHOCARDIOGRAM INTRAOPERATIVE N/A 6/16/2023    Procedure: ECHOCARDIOGRAM,TRANSESOPHAGEAL,WITH ANESTHESIA;  Surgeon: Dennis Meier DO;  Location: [facility] OR    TUBAL LIGATION         Social History  Tobacco:   History   Smoking Status    Never   Smokeless Tobacco    Never    Alcohol:   Social History    Substance and Sexual Activity      Alcohol use: Not Currently   Illicit Drugs:   History   Drug Use No       Family History  Family History   Problem Relation Age of Onset    Ataxia Mother     Heart Disease Father     Diabetes No family hx of     Myocardial Infarction No family hx of     Cerebrovascular Disease No family hx of     Coronary Artery Disease Early Onset No family hx of     Breast Cancer No family hx of     Colon Cancer No family hx of     Ovarian Cancer No family hx of           Todd Thomas MD on 8/30/2023      cc: Sharmaine Burks,     Thank you for  allowing me to participate in the care of your patient.      Sincerely,     Todd Tohmas MD     [facility]  cc:   No referring provider defined for this encounter.

## 2023-08-30 NOTE — PROGRESS NOTES
[facility]: Heart Failure Care Coordination   Heart Failure Education    Situation/Background:      RN CC provided heart failure education to patient during clinic visit.    Assessment:      Living situation: home with family    Barriers to Heart Failure follow-up: none     Medication management: independent    Patient already is a great label reader, and she is very cautious with items she purchases in the grocery store to be sure they are low in sodium. She at times will use frozen meals, but they are 500mg or under, then adds a frozen vegetable.    Intervention/Plan:      CM/HF education topics reviewed:  Low sodium: 2000 mg or less daily, meal choices and label reading   Fluid Restriction: 50-60 oz per day, she is likely close to 50 oz per her assessment   Daily weight monitoring and logging   Medication review and importance of compliance   Home blood pressure monitoring and logging   Overview of C.O.R.E. clinic   Overview of heart failure appointments and testing   Importance of exercise   Symptoms of HF to be reported to Core Team      Education materials provided:  Low sodium food and drink handout  Low sodium food product examples  Already prepared low salt meal delivery services handout  HF stoplight tool  Guide to HF booklet  Cardiac medication handout  C.O.R.E. information brochure     HF resources reviewed:  [employer]  Heart Failure support group  HRS      Patient to follow up as scheduled.     Patient expressed understanding of above education/instructions and denied further questions at this time.    Diana HOOVER RN BSN, CHFN, PCCN-K

## 2023-09-05 NOTE — PROGRESS NOTES
"  ASSESSMENT/PLAN                                                      (R39.9) Lower urinary tract symptoms (LUTS)  (primary encounter diagnosis)  Comment: UA abnormal, but contaminated and not obviously consistent with a UTI.  Plan: await culture (hold on antibiotics for now); if symptoms worsen, change, or do not improve, patient to contact MD.      Sharmaine Burks MD   [facility]  [address] [place] [address]  T: [phone], F: [phone]    SUBJECTIVE                                                      Priya Funez is a very pleasant 82 year old female who presents with urinary symptoms:    Symptoms started last week. Patient describes a \"funny feeling\" in her lower abdomen and with urination. No pain or dysuria. Unsure of urinary frequency or urgency, because she takes torsemide and spironolactone daily.  No hematuria. No flank pain, nausea, vomiting. Patient does endorse nightly fevers and feeling more fatigued than usual. No chills or sweats.    Medications significant for Jardiance.    OBJECTIVE                                                      /80   Pulse 83   Temp 98.3  F (36.8  C) (Oral)   Ht 1.6 m (5' 3\")   Wt 69.8 kg (153 lb 12.8 oz)   SpO2 98%   BMI 27.24 kg/m    Constitutional: well-appearing  Musculoskeletal: walks with walker  Psych: normal judgment and insight; normal mood and affect; recent and remote memory intact    UA (today): moderate squamous epithelial cells, slightly cloudy, small leuk esterase, 25-50 WBCs, WBC clumps, few bacteria, and 500mg/dL of glucose (expected with Jardiance use); negative for nitrites and blood    ---    (Note documentation was completed, in part, with [employer] voice-recognition software. Documentation was reviewed, but some grammatical, spelling, and word errors may remain.)    "

## 2023-09-05 NOTE — PROGRESS NOTES
ANTICOAGULATION MANAGEMENT     Priya DAMON Armin 82 year old female is on warfarin with therapeutic INR result. (Goal INR 2.0-3.0)    Recent labs: (last 7 days)     09/05/23  1413   INR 2.0*       ASSESSMENT     Source(s): Chart Review and Patient/Caregiver Call     Warfarin doses taken: Warfarin taken as instructed  Diet: No new diet changes identified  Medication/supplement changes: None noted  New illness, injury, or hospitalization: Yes: currently having lower urinary tract symptoms but provider holding off on antibiotics until Urine Culture results return. Patient understands if antibiotics are prescribed to call [facility] RN and earlier INR will be needed about 4 days after starting  Signs or symptoms of bleeding or clotting: No  Previous result: Subtherapeutic d/t 4-day hold for angiogram on 8/29/23.  Additional findings: None       PLAN     Recommended plan for no diet, medication or health factor changes affecting INR     Dosing Instructions: Continue your current warfarin dose with next INR in 2 weeks       Summary  As of 9/5/2023      Full warfarin instructions:  4 mg every Sun, Thu; 2 mg all other days   Next INR check:  9/19/2023               Telephone call with Priya who verbalizes understanding and agrees to plan    Lab visit scheduled    Education provided:   Please call back if any changes to your diet, medications or how you've been taking warfarin  Contact [phone]  with any changes, questions or concerns.     Plan made per [facility] anticoagulation protocol    Amara Villarreal RN  Anticoagulation Clinic  9/5/2023    _______________________________________________________________________     Anticoagulation Episode Summary       Current INR goal:  2.0-3.0   TTR:  60.9 % (1 y)   Target end date:  Indefinite   Send INR reminders to:  [facility]    Indications    Chronic atrial fibrillation (H) [I48.20]  Current use of long term anticoagulation (Resolved) [Z79.01]             Comments:                Anticoagulation Care Providers       Provider Role Specialty Phone number    Sharmaine Burks MD Referring Internal Medicine [phone]

## 2023-09-06 NOTE — PROGRESS NOTES
CLASP II TR Lab And EKG  Baseline/Screening Review    Purpose: Sagastume OZZIE TrAnScatheter Valve RePair System Pivotal Clinical Trial (CLASP II TR): A prospective, multicenter, randomized, controlled pivotal trial to evaluate the safety and effectiveness of transcatheter tricuspid valve repair with the Sagastume OZZIE Transcatheter Valve Repair System and optimal medical therapy (OMT) compared to OMT alone in patients with tricuspid regurgitation.     Dr. Torres -- Please specify if the lab work and EKG is clinically significant or not. If clinical significant, please indicate what next actions should be!     If CS, please indicate next actions!     EKG from 8/29/23 09:15 AM  []  CS    [x]  NCS        8/29/23 09:15am     Systolic Blood Pressure mmHg      Diastolic Blood Pressure mmHg      Ventricular Rate BPM 66     Atrial Rate      UT Interval ms      QRS Duration ms 160     QT ms 504     QTc ms 528     P Axis degrees 91     R AXIS degrees 114     T Axis degrees 32     Interpretation ECG  Ventricular-paced rhythm  Atrial fibrillation  Abnormal ECG  When compared with ECG of 31-JUL-2023 13:36,  No significant change was found  Confirmed by MD HENRIK, DEMOS ([id number]),  Justino Holland ([id number]) on 8/31/2023 9:04:34 AM          Component      Latest Ref Rng 8/29/2023  8:55 AM 8/30/2023 2:06PM NC/NCS   WBC      4.0 - 11.0 10e3/uL 6.0      RBC Count      3.80 - 5.20 10e6/uL 4.32      Hemoglobin      11.7 - 15.7 g/dL 12.8      Hematocrit      35.0 - 47.0 % 41.1      Platelet Count      150 - 450 10e3/uL 250      INR      0.85 - 1.15  1.29 (H)   []  CS    [x]  NCS    PTT      22 - 38 Seconds 28        Urea Nitrogen      8.0 - 23.0 mg/dL  25.3 (H)  []  CS    [x]  NCS    Creatinine      0.51 - 0.95 mg/dL  1.33 (H)  []  CS    [x]  NCS    Alkaline Phosphatase      35 - 104 U/L  127 (H)  []  CS    []  NCS    AST      0 - 45 U/L  24     ALT      0 - 50 U/L  16     Albumin      3.5 - 5.2 g/dL  4.2     Bilirubin  Total      <=1.2 mg/dL  0.8     GFR Estimate      >60 mL/min/1.73m2  40 (L)  []  CS    [x]  NCS    CK Total      26 - 192 U/L  40     Troponin T, High Sensitivity      <=14 ng/L  20 (H)  []  CS    [x]  NCS    Uric Acid      2.4 - 5.7 mg/dL  7.9 (H)  []  CS    [x]  NCS    GGT      5 - 36 U/L  30     N-Terminal Pro Bnp      0 - 1,800 pg/mL  3,249 (H)  []  CS    [x]  NCS       Legend:  (L) Low  (H) High      [facility]ISE [facility]

## 2023-09-06 NOTE — TELEPHONE ENCOUNTER
Writer spoke with patient and advised new dosing plan as outlined below and scheduled for recheck on 9/8/23. Lucretia Go, AYSEN, RN

## 2023-09-06 NOTE — TELEPHONE ENCOUNTER
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 - Plan : 4 lLashes epilated at slit lamp today with forceps, without incident. Verbal consent obtained. Advised patient to call our office with decreased vision or increased symptoms. ANTICOAGULATION  MANAGEMENT     Interacting Medication Review    Interacting medication(s): Sulfamethoxazole-Trimethoprim (Bactrim) with warfarin.    Duration: 5 days (9/6/23 to 9/11/23)    Indication: Acute cystitis without hematuria    New medication?: Yes, interaction may increase INR and risk of bleeding. With Sulfamethoxazole-Trimethoprim, [facility] protocol Appendix G recommends empiric reduction of warfarin dose in therapeutic/supratherapeutic patients.        PLAN     Temporarily adjust warfarin dose during interaction (11.1% change) with next INR in 2 days        Summary  As of 9/6/2023      Full warfarin instructions:  9/7: 2 mg; Otherwise 4 mg every Sun, Thu; 2 mg all other days   Next INR check:  9/8/2023               Attempted to reach patient on home and cell numbers - neither answered or vm available    Daily doses modified on anticoagulation calendar for interaction <= 7 days    Plan made per [facility] anticoagulation protocol    Lucretia Go RN  Anticoagulation Clinic

## 2023-09-08 NOTE — PROGRESS NOTES
ANTICOAGULATION MANAGEMENT     Priya Funez 82 year old female is on warfarin with subtherapeutic INR result. (Goal INR 2.0-3.0)    Recent labs: (last 7 days)     09/08/23  1553   INR 1.8*       ASSESSMENT     Source(s): Chart Review and Patient/Caregiver Call     Warfarin doses taken: Less warfarin taken than planned which may be affecting INR - patient had empiric dose reduction due to bactrim abx  Diet: No new diet changes identified  Medication/supplement changes:  bactrim x 2 more days  New illness, injury, or hospitalization: No  Signs or symptoms of bleeding or clotting: No  Previous result: Therapeutic last visit; previously outside of goal range  Additional findings:  No boost today because of bactrim       PLAN     Recommended plan for temporary change(s) affecting INR     Dosing Instructions: Continue your current warfarin dose with next INR in 10 days       Summary  As of 9/8/2023      Full warfarin instructions:  4 mg every Sun, Thu; 2 mg all other days   Next INR check:  9/19/2023               Telephone call with Priya who verbalizes understanding and agrees to plan    Lab visit scheduled    Education provided:   Please call back if any changes to your diet, medications or how you've been taking warfarin    Plan made per [facility] anticoagulation protocol    Marcelino Palomares RN  Anticoagulation Clinic  9/8/2023    _______________________________________________________________________     Anticoagulation Episode Summary       Current INR goal:  2.0-3.0   TTR:  60.1 % (1 y)   Target end date:  Indefinite   Send INR reminders to:  [facility]    Indications    Chronic atrial fibrillation (H) [I48.20]  Current use of long term anticoagulation (Resolved) [Z79.01]             Comments:               Anticoagulation Care Providers       Provider Role Specialty Phone number    Sharmaine Burks MD Referring Internal Medicine [phone]

## 2023-09-08 NOTE — ANESTHESIA POSTPROCEDURE EVALUATION
Patient: Priya Funez    Procedure: Procedure(s):  ECHOCARDIOGRAM,TRANSESOPHAGEAL,WITH ANESTHESIA       Anesthesia Type:  MAC    Note:     Postop Pain Control: Uneventful            Sign Out: Well controlled pain   PONV: No   Neuro/Psych: Uneventful            Sign Out: Acceptable/Baseline neuro status   Airway/Respiratory: Uneventful            Sign Out: Acceptable/Baseline resp. status   CV/Hemodynamics: Uneventful            Sign Out: Acceptable CV status; No obvious hypovolemia; No obvious fluid overload   Other NRE: NONE   DID A NON-ROUTINE EVENT OCCUR? No           Last vitals:  Vitals Value Taken Time   /55 09/08/23 1048   Temp 36.4  C (97.5  F) 09/08/23 0948   Pulse 61 09/08/23 1052   Resp 12 09/08/23 1045   SpO2 90 % 09/08/23 1052   Vitals shown include unvalidated device data.    Electronically Signed By: Ray Millan MD

## 2023-09-08 NOTE — ANESTHESIA CARE TRANSFER NOTE
Patient: Priya Funez    Procedure: Procedure(s):  ECHOCARDIOGRAM,TRANSESOPHAGEAL,WITH ANESTHESIA       Diagnosis: Severe tricuspid regurgitation [I07.1]  Diagnosis Additional Information: No value filed.    Anesthesia Type:   MAC     Note:    Oropharynx: oropharynx clear of all foreign objects and spontaneously breathing  Level of Consciousness: drowsy  Oxygen Supplementation: room air    Independent Airway: airway patency satisfactory and stable  Dentition: dentition unchanged  Vital Signs Stable: post-procedure vital signs reviewed and stable  Report to RN Given: handoff report given  Patient transferred to: Phase II    Handoff Report: Identifed the Patient, Identified the Reponsible Provider, Reviewed the pertinent medical history, Discussed the surgical course, Reviewed Intra-OP anesthesia mangement and issues during anesthesia, Set expectations for post-procedure period and Allowed opportunity for questions and acknowledgement of understanding      Vitals:  Vitals Value Taken Time   BP     Temp     Pulse     Resp     SpO2         Electronically Signed By: TAMRA Gallegos CRNA  September 8, 2023  9:41 AM

## 2023-09-08 NOTE — TELEPHONE ENCOUNTER
ANTICOAGULATION  MANAGEMENT: Discharge Review    Priyabereket Funez chart reviewed for anticoagulation continuity of care    Outpatient surgery/procedure on 9/8/23 for ALEX.    Discharge disposition: Home    Results:    Recent labs: (last 7 days)     09/05/23  1413   INR 2.0*     Anticoagulation inpatient management:     not applicable     Anticoagulation discharge instructions:     Warfarin dosing: home regimen continued   Bridging: No   INR goal change: No      Medication changes affecting anticoagulation: No    Additional factors affecting anticoagulation: No     PLAN     No adjustment to anticoagulation plan needed    Patient not contacted    No adjustment to Anticoagulation Calendar was required    Marcelino Palomares RN

## 2023-09-08 NOTE — INTERVAL H&P NOTE
"I have reviewed the surgical (or preoperative) H&P that is linked to this encounter, and examined the patient. There are no significant changes    Please also see the H&P note scanned into the chart dated 8/23/23    Clinical Conditions Present on Arrival:  Clinically Significant Risk Factors Present on Admission                # Drug Induced Coagulation Defect: home medication list includes an anticoagulant medication  # Drug Induced Platelet Defect: home medication list includes an antiplatelet medication  # Overweight: Estimated body mass index is 26.78 kg/m  as calculated from the following:    Height as of 9/5/23: 1.6 m (5' 3\").    Weight as of an earlier encounter on 9/8/23: 68.6 kg (151 lb 3.2 oz).       "

## 2023-09-08 NOTE — PRE-PROCEDURE
GENERAL PRE-PROCEDURE:   Procedure:  Transesophageal echocardiogram    Verbal consent obtained?: Yes    Written consent obtained?: Yes    Risks and benefits: Risks, benefits and alternatives were discussed    Consent given by:  Patient  Patient states understanding of procedure being performed: Yes    Patient's understanding of procedure matches consent: Yes    Procedure consent matches procedure scheduled: Yes    : per anesthesia.  Appropriately NPO:  Yes  Mallampati  :  Grade 2- soft palate, base of uvula, tonsillar pillars, and portion of posterior pharyngeal wall visible  Lungs:  Lungs clear with good breath sounds bilaterally  Heart:  Normal heart sounds and rate  History & Physical reviewed:  History and physical reviewed and no updates needed  Statement of review:  I have reviewed the lab findings, diagnostic data, medications, and the plan for sedation

## 2023-09-08 NOTE — ANESTHESIA PREPROCEDURE EVALUATION
Anesthesia Pre-Procedure Evaluation    Patient: Priya Funez   MRN: [id number] : 1940        Procedure : Procedure(s):  ECHOCARDIOGRAM,TRANSESOPHAGEAL,WITH ANESTHESIA          Past Medical History:   Diagnosis Date    Antiplatelet or antithrombotic long-term use     Benign essential hypertension     Cardiac pacemaker in situ     Medtronic    Chronic atrial fibrillation (H)     s/p AV node ablation and PPM placement     Chronic diastolic heart failure (H)     Chronic kidney disease, stage III (moderate) (H)     Chronic right-sided heart failure (H)     Congestive heart failure (H)     Dyspnea     NEDA (generalized anxiety disorder)     History of aortic dissection     s/p emergent repair     History of blood transfusion     no adverse reactions    Hypertension     Irregular heart beat     Mild mitral regurgitation     Obesity     Severe tricuspid regurgitation     Sleeps in sitting position due to orthopnea       Past Surgical History:   Procedure Laterality Date    ANGIOGRAM Right 2019    Procedure: DIAGONSTIC ANGIOGRAM OF SMA;  Surgeon: Rigo Jennings MD;  Location:  OR    BIOPSY BREAST      BYPASS GRAFT ARTERY CORONARY, REPAIR VALVE AORTIC, COMBINED N/A 2019    Procedure: AORTIC DISSECTION REPAIR WITH GRAFT- HEMASHIELD PLATINUM WOVEN DOUBLE VELOR 4 SIDE ARM VASCULAR GRAFT, D:35 X 12 X 10 X 10MM/ L:50CM;  Surgeon: Jose Winslow MD;  Location:  OR    CV CORONARY ANGIOGRAM N/A 2023    Procedure: Coronary Angiogram;  Surgeon: Ar Morales MD;  Location:  HEART [facility]ARD[facility] CATH LAB    CV PCI N/A 2023    Procedure: Percutaneous Coronary Intervention;  Surgeon: Ar Morales MD;  Location:  [facility]ATH [facility]    CV RIGHT HEART CATH MEASUREMENTS RECORDED N/A 2023    Procedure: Right Heart Catheterization;  Surgeon: Ar Morales MD;  Location:  HE[facility]ATH [facility]    EP ABLATION AV NODE N/A 2019     Procedure: EP Ablation AV Node;  Surgeon: Tsering Davey MD;  Location:  HEART CARDI[facility] CATH LAB    EP LEAD EXTRACTION  7/31/2023    EP PACEMAKER Left 01/11/2019    Procedure: EP Pacemaker;  Surgeon: Tsering Davey MD;  Location:  HEART CARDIAC CATH LAB    EP PACEMAKER LEADLESS DEVICE IMPLANT  7/31/2023    EP PACEMAKER LEADLESS DEVICE IMPLANT N/A 7/31/2023    Procedure: Pacemaker Leadless Device Implant;  Surgeon: Gaston Car MD;  Location: [facility] OR    THYROID SURGERY      benign mass removed    TRACHEOSTOMY N/A 01/25/2019    Procedure: TRACHEOSTOMY  (DR MCCLELLAND);  Surgeon: Bryson Mcclelland MD;  Location:  OR    TRANSESOPHAGEAL ECHOCARDIOGRAM INTRAOPERATIVE N/A 6/16/2023    Procedure: ECHOCARDIOGRAM,TRANSESOPHAGEAL,WITH ANESTHESIA;  Surgeon: Dennis Meier DO;  [place] [facility] [place]    TUBAL LIGATION        Allergies   Allergen Reactions    Amoxicillin Swelling     Face and body    Ciprofloxacin Hives      Social History     Tobacco Use    Smoking status: Never    Smokeless tobacco: Never   Substance Use Topics    Alcohol use: Not Currently      Wt Readings from Last 1 Encounters:   09/08/23 68.6 kg (151 lb 3.2 oz)        Anesthesia Evaluation   Pt has had prior anesthetic.     No history of anesthetic complications       ROS/MED HX  ENT/Pulmonary:    (-) tobacco use, asthma, COPD and sleep apnea   Neurologic:    (-) no seizures, no CVA and no TIA   Cardiovascular: Comment: History of emergent aortic dissection repair 1/4/2019 with prolonged hospitalization with tracheostomy after repair.    Cardiac MR 5/2/23:  CONCLUSIONS:      1) Moderate right ventricular dilatation with normal systolic function.     2) There is severe tricuspid regurgitation (regurgitant volume of 40 ml, corresponding to a regurgitant  fraction of 55%). The jet appears to originate in close proximity to the pacemaker lead attachment to the  septal leaflet.      3)  The patient is status post ascending aortic dissection  repair. A dissection flap is noted within the  descending thoracic aorta.     (+)  hypertension- -   -  - -   Taking blood thinners  Instructions Given to patient: on Coumadin, last dose 6/. CHF  Last EF: 72%      pacemaker,          dysrhythmias (s/p ablation, permanent pacemaker since 2019), a-fib,  valvular problems/murmurs  TR.         METS/Exercise Tolerance:     Hematologic:       Musculoskeletal:       GI/Hepatic:       Renal/Genitourinary:     (+) renal disease, type: CRI,            Endo:    (-) Type II DM and obesity   Psychiatric/Substance Use:       Infectious Disease:       Malignancy:       Other:            Physical Exam    Airway        Mallampati: II   TM distance: > 3 FB   Neck ROM: full   Mouth opening: > 3 cm    Respiratory Devices and Support         Dental     Comment: Fair dentition, no loose or removable teeth        Cardiovascular          Rhythm and rate: regular and normal     Pulmonary   pulmonary exam normal                OUTSIDE LABS:  CBC:   Lab Results   Component Value Date    WBC 6.0 08/29/2023    WBC 7.2 08/02/2023    HGB 12.8 08/29/2023    HGB 11.7 08/02/2023    HCT 41.1 08/29/2023    HCT 38.7 08/02/2023     08/29/2023     08/02/2023     BMP:   Lab Results   Component Value Date     08/30/2023     08/29/2023    POTASSIUM 4.8 08/30/2023    POTASSIUM 4.4 08/29/2023    CHLORIDE 98 08/30/2023    CHLORIDE 99 08/29/2023    CO2 31 (H) 08/30/2023    CO2 30 (H) 08/29/2023    BUN 25.3 (H) 08/30/2023    BUN 27.6 (H) 08/29/2023    CR 1.33 (H) 08/30/2023    CR 1.37 (H) 08/29/2023    GLC 80 09/08/2023    GLC 72 08/30/2023     COAGS:   Lab Results   Component Value Date    PTT 28 08/29/2023    INR 2.0 (H) 09/05/2023    FIBR 148 (L) 01/04/2019     POC:   Lab Results   Component Value Date     (H) 01/29/2019     HEPATIC:   Lab Results   Component Value Date    ALBUMIN 4.2 08/30/2023    PROTTOTAL 6.6 08/30/2023    ALT 16 08/30/2023    AST 24 08/30/2023    GGT 30  08/30/2023    ALKPHOS 127 (H) 08/30/2023    BILITOTAL 0.8 08/30/2023     OTHER:   Lab Results   Component Value Date    PH 7.55 (H) 02/19/2019    LACT 0.3 08/29/2023    A1C 5.6 01/30/2019    BESS 9.7 08/30/2023    PHOS 4.1 02/02/2019    MAG 2.3 02/07/2019    LIPASE 145 01/04/2019       Anesthesia Plan    ASA Status:  3    NPO Status:  NPO Appropriate    Anesthesia Type: MAC.              Consents    Anesthesia Plan(s) and associated risks, benefits, and realistic alternatives discussed. Questions answered and patient/representative(s) expressed understanding.     - Discussed:     - Discussed with:  Patient       - Patient is DNR/DNI Status: No          Postoperative Care            Comments:    Other Comments: Propofol gtt only, low dose - maintain spontaneous respirations - avoid hypoxia, hypercarbia, acidosis             Ray Millan MD

## 2023-09-08 NOTE — PROGRESS NOTES
Priya Funez is a 82 year old female patient.  1. Severe tricuspid regurgitation    2. Examination of participant or control in clinical research    3. Tricuspid regurgitation    4. Tricuspid valve regurgitation, nonrheumatic      Past Medical History:   Diagnosis Date     Antiplatelet or antithrombotic long-term use      Benign essential hypertension      Cardiac pacemaker in situ     Medtronic     Chronic atrial fibrillation (H)     s/p AV node ablation and PPM placement January, 2019     Chronic diastolic heart failure (H)      Chronic kidney disease, stage III (moderate) (H)      Chronic right-sided heart failure (H)      Congestive heart failure (H)      Dyspnea      NEDA (generalized anxiety disorder)      History of aortic dissection     s/p emergent repair 2019     History of blood transfusion     no adverse reactions     Hypertension      Irregular heart beat      Mild mitral regurgitation      Obesity      Severe tricuspid regurgitation      Sleeps in sitting position due to orthopnea      No current outpatient medications on file.     Allergies   Allergen Reactions     Amoxicillin Swelling     Face and body     Ciprofloxacin Hives     Active Problems:    * No active hospital problems. *    not currently breastfeeding.    Subjective  Objective:  Vital signs: (most recent): not currently breastfeeding.    Assessment & Plan    Rios Heredia MD  9/8/2023

## 2023-09-12 NOTE — TELEPHONE ENCOUNTER
Relayed providers message to patient and reviewed script instructions. Patient verbalized understanding and agreement, has no further questions at this time.

## 2023-09-12 NOTE — TELEPHONE ENCOUNTER
"Received a call from the patient stating she saw Dr. Burks recently and she was diagnosed with a bladder infection. Patient states she took her last dose of the antibiotic on Sunday. However, last night she had a 101 fever and she feels like a \"truck ran over me.\" Patient states last night was the worse night she has felt. No pain with urination. No blood in the urine. No N/V. Patient states she does feel some \"twitches in the lower abdomen.\"    Patient is wondering if she should come back in for another urine sample? Different antibiotic?    Will route to PCP for review.     Can we leave a detailed message on this number? YES  Phone number patient can be reached at: Home number on file [phone] (home)    Pauline Garcia RN  [facility]  "

## 2023-09-12 NOTE — TELEPHONE ENCOUNTER
ANTICOAGULATION  MANAGEMENT     Interacting Medication Review    Interacting medication(s): Sulfamethoxazole-Trimethoprim (Bactrim) with warfarin.    Duration: 5 days (9/12 to 9/17)    Indication: UTI    New medication?:  just finished 5 day course yesterday and was ordered another for persisting sxs      Most recent INR subtherapeutic so no empiric reduction made today.      PLAN     Continue your current warfarin dose with next INR in 3-5 days          Telephone call with Priya and scheduled INR for 9/15/23    New recheck date updated on anticoagulation calendar     Plan made per [facility] anticoagulation protocol    Marcelino Palomares RN  Anticoagulation Clinic

## 2023-09-14 NOTE — PROGRESS NOTES
[facility]  Cardiovascular and Thoracic Surgery Consultation    Priya Funez MRN# [id number]   YOB: 1940 Age: 82 year old        Reason for consult: I was asked to evaluate this patient for her severe symptomatic tricuspid valve regurgitation.           Assessment and Plan:   83 yo F s/p acute Type A aortic dissection repair on 1/4/2019, s/p AV node ablation and PPM implantation subsequently in 2019 with severe TR, recently being worked up for possible enrollment in the CLASP II trial for percutaneous tricuspid valve repair. PPM leads across the tricuspid valve was removed on 7/31/23. She continues to have severe TR. I think given her age and previous sternotomy for Type A dissection repair with prolonged postop recovery course, it is reasonable to offer her percutaneous tricuspid procedure if she qualifies for the trial. Is she is not a candidate, however, (given her tricuspid valve anatomy), I believe she is a good surgical candidate and I would recommend a minimally invasive tricuspid valve repair/replacement via a R mini thoracotomy approach. She is happy to hear this and we will see her back again if she is not a CLASP II Trial candidate.       Attestation:  Joyce Mabry MD           Chief Complaint:   Fatigue and shortness of breath.             History of Present Illness:   This patient is a 82 year old female who presents with severe tricuspid valve regurgitation who is being evaluated for the CLASP II Trial. She is here to be evaluated surgically.              Past Medical History:          Birth History:   No birth history on file.          Past Surgical History:     Past Surgical History:   Procedure Laterality Date    ANGIOGRAM Right 01/04/2019    Procedure: DIAGONSTIC ANGIOGRAM OF [facility];  Surgeon: Rigo Jennings MD;  Location: [facility] OR    BIOPSY BREAST      BYPASS GRAFT ARTERY CORONARY, REPAIR VALVE AORTIC, COMBINED N/A 01/04/2019    Procedure:  AORTIC DISSECTION REPAIR WITH GRAFT- HEMASHIELD PLATINUM WOVEN DOUBLE VELOR 4 SIDE ARM VASCULAR GRAFT, D:35 X 12 X 10 X 10MM/ L:50CM;  Surgeon: Jose Winslow MD;  Location:  OR    CV CORONARY ANGIOGRAM N/A 8/29/2023    Procedure: Coronary Angiogram;  Surgeon: Ar Morales MD;  Location:  HEART CARDIAC CATH LAB    CV PCI N/A 8/29/2023    Procedure: Percutaneous Coronary Intervention;  Surgeon: Ar Morales MD;  Location:  HEART CARDIAC CATH LAB    CV RIGHT HEART CATH MEASUREMENTS RECORDED N/A 8/29/2023    Procedure: Right Heart Catheterization;  Surgeon: Ar Morales MD;  Location:  HEART CARDIAC CATH LAB    EP ABLATION AV NODE N/A 01/11/2019    Procedure: EP Ablation AV Node;  Surgeon: Tsering Davey MD;  Location:  HEART CARDIAC CATH LAB    EP LEAD EXTRACTION  7/31/2023    EP PACEMAKER Left 01/11/2019    Procedure: EP Pacemaker;  Surgeon: Tsering Davey MD;  Location:  HEART CARDIAC CATH LAB    EP PACEMAKER LEADLESS DEVICE IMPLANT  7/31/2023    EP PACEMAKER LEADLESS DEVICE IMPLANT N/A 7/31/2023    Procedure: Pacemaker Leadless Device Implant;  Surgeon: Gaston Car MD;  Location: [facility] OR    THYROID SURGERY      benign mass removed    TRACHEOSTOMY N/A 01/25/2019    Procedure: TRACHEOSTOMY  (DR MCCLELLAND);  Surgeon: Bryson Mcclelland MD;  Location:  OR    TRANSESOPHAGEAL ECHOCARDIOGRAM INTRAOPERATIVE N/A 6/16/2023    Procedure: ECHOCARDIOGRAM,TRANSESOPHAGEAL,WITH ANESTHESIA;  Surgeon: Dennis Meier DO;  Location: [facility] OR    TRANSESOPHAGEAL ECHOCARDIOGRAM INTRAOPERATIVE N/A 9/8/2023    Procedure: ECHOCARDIOGRAM,TRANSESOPHAGEAL,WITH ANESTHESIA;  Surgeon: Rios Heredia MD;  Location: [facility] OR    TUBAL LIGATION                 Social History:     Social History     Tobacco Use    Smoking status: Never    Smokeless tobacco: Never   Substance Use Topics    Alcohol use: Not Currently             Family History:     Family History   Problem  Relation Age of Onset    Ataxia Mother     Heart Disease Father     Diabetes No family hx of     Myocardial Infarction No family hx of     Cerebrovascular Disease No family hx of     Coronary Artery Disease Early Onset No family hx of     Breast Cancer No family hx of     Colon Cancer No family hx of     Ovarian Cancer No family hx of              Immunizations:     Immunization History   Administered Date(s) Administered    COVID-19 Bivalent 12+ (Pfizer) 09/30/2022    COVID-19 MONOVALENT 12+ (Pfizer) 01/30/2021, 02/20/2021, 09/30/2021    COVID-19 Monovalent 12+ (Pfizer 2022) 04/25/2022    FLU 6-35 months 01/10/2013    Flu 65+ Years 10/18/2019    Influenza Vaccine 65+ (FLUAD) 10/15/2020    Influenza Vaccine 65+ (Fluzone HD) 11/11/2022    Influenza Vaccine >6 months (Alfuria,Fluzone) 12/06/2021    Pneumo Conj 13-V (2010&after) 02/22/2017    Pneumococcal 23 valent 05/01/2009    TDAP (Adacel,Boostrix) 12/15/2014    TDAP Vaccine (Adacel) 12/15/2014             Allergies:     Allergies   Allergen Reactions    Amoxicillin Swelling     Face and body    Ciprofloxacin Hives             Medications:     Current Outpatient Medications Ordered in Epic   Medication    Acetaminophen 325 MG CAPS    albuterol (PROAIR HFA/PROVENTIL HFA/VENTOLIN HFA) 108 (90 Base) MCG/ACT inhaler    aspirin (ASA) 81 MG EC tablet    cholecalciferol 25 MCG (1000 UT) TABS    cyanocobalamin (VITAMIN B-12) 1000 MCG tablet    empagliflozin (JARDIANCE) 10 MG TABS tablet    escitalopram (LEXAPRO) 10 MG tablet    metoprolol tartrate (LOPRESSOR) 25 MG tablet    mirtazapine (REMERON) 15 MG tablet    spironolactone (ALDACTONE) 25 MG tablet    torsemide (DEMADEX) 10 MG tablet    warfarin ANTICOAGULANT (COUMADIN) 2 MG tablet    warfarin ANTICOAGULANT (COUMADIN) 4 MG tablet    sulfamethoxazole-trimethoprim (BACTRIM DS) 800-160 MG tablet     No current Epic-ordered facility-administered medications on file.             Review of Systems:   The 10 point Review of  Systems is negative other than noted in the HPI         Physical Exam:   Vitals were reviewed  All vitals stable  Gen: pleasant, in no acute distress  CV: RRR, normal S1 and S2, grade 2/6 systolic murmur at the apex  Resp: CTAB  Abd: soft, NT/ND  Ext: trace edema  Neuro: no focal deficits.           Data:     Lab Results   Component Value Date    PH 7.55 (H) 02/19/2019    PO2 81 02/19/2019    PCO2 41 02/19/2019    HCO3 26 01/24/2019    FABIANA 12.4 02/19/2019          Lab Results   Component Value Date     08/30/2023     06/23/2021    Lab Results   Component Value Date    CHLORIDE 98 08/30/2023    CHLORIDE 99 12/13/2022    CHLORIDE 104 06/23/2021    Lab Results   Component Value Date    BUN 25.3 08/30/2023    BUN 31 12/13/2022    BUN 30 06/23/2021      Lab Results   Component Value Date    POTASSIUM 4.8 08/30/2023    POTASSIUM 3.8 12/13/2022    POTASSIUM 4.5 06/23/2021    Lab Results   Component Value Date    CO2 31 08/30/2023    CO2 33 12/13/2022    CO2 31 06/23/2021    Lab Results   Component Value Date    CR 1.33 08/30/2023    CR 1.29 06/23/2021        Lab Results   Component Value Date    WBC 6.0 08/29/2023    HGB 12.8 08/29/2023    HCT 41.1 08/29/2023    MCV 95 08/29/2023     08/29/2023     Lab Results   Component Value Date    CR 1.33 (H) 08/30/2023     Lab Results   Component Value Date    GLC 80 09/08/2023     Lab Results   Component Value Date    HGB 12.8 08/29/2023     Lab Results   Component Value Date    AST 24 08/30/2023    ALT 16 08/30/2023    GGT 30 08/30/2023    ALKPHOS 127 (H) 08/30/2023    BILITOTAL 0.8 08/30/2023     Lab Results   Component Value Date    INR 1.8 (H) 09/08/2023     Lab Results   Component Value Date     08/29/2023     Lab Results   Component Value Date    BUN 25.3 (H) 08/30/2023    CR 1.33 (H) 08/30/2023     Lab Results   Component Value Date    TROPI <0.015 04/04/2019     Lab Results   Component Value Date    WBC 6.0 08/29/2023     TTE 9-8-23:  Interpretation  Summary     Left ventricular size, wall motion and function are normal. The ejection  fraction is 55-60%.  The right ventricle is mildly dilated with normal systolic function.  There is malcoaptation of the tricuspid valve, primarily driven by the septal  leaflet resulting in very severe valvular regurgitation.  Compared to the prior study of 6/16/23 the right ventricular transvenous  pacemaker has been extracted. Severe tricuspid regurgitation persists.  ______________________________________________________________________________  Left Ventricle  Left ventricular size, wall motion and function are normal. The ejection  fraction is 55-60%. There is normal left ventricular wall thickness. Left  ventricular diastolic function is not assessable.     Right Ventricle  The right ventricle is mildly dilated. The right ventricular systolic function  is normal. A leadless pacemaker is visualized within the right ventricle.     Atria  Normal left atrial size. The right atrium is severely dilated. 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     Mitral Valve  Mitral valve leaflets appear normal. There is no evidence of mitral stenosis  or clinically significant mitral regurgitation.     Tricuspid Valve  Tricuspid valve fails to coapt. There is severe (4+) tricuspid regurgitation.  The right ventricular systolic pressure is approximated at 23.4 mmHg plus the  right atrial pressure. There is no tricuspid stenosis.     Aortic Valve  Aortic valve leaflets appear normal. There is no evidence of aortic stenosis  or clinically significant aortic regurgitation. The aortic valve is  trileaflet.     Pulmonic Valve  The pulmonic valve is normal in structure and function.     Vessels  IVC diameter >2.1 cm collapsing <50% with sniff suggests a high RA pressure  estimated at 15 mmHg or greater. Hepatic vein flow consistent with severe  tricuspid insufficiency is present.     Pericardium  There is no pericardial effusion.      ALEX  9/8/23:  Interpretation Summary     1. Left ventricular size, wall motion and function are normal. The ejection  fraction is 55-60%.  2. Right ventricle is mildly dilated with normal systolic function. A leadless  pacemaker is visualized within the right ventricle.  3. Right atrium is severely dilated.  4. A patent foramen ovale is identified.  5. There is malcoaptation of the tricuspid valve, primarily due to the septal  leaflet which appears small in size. This results in very severe valvular  regurgitation. The other two leaflets appear normal in thickness and mobility.  The coaptation gap on the septal aspect of the tricuspid valve is 16 mm.  6. There is an intimal flap and dissection plane in the aortic arch extending  down the descending aorta. The dissection plane appears filled with thrombus  and is without flow by color doppler.     ______________________________________________________________________________  ALEX  Consent to the procedure was obtained prior to sedation. Prior to the exam,  the oral cavity was checked and no overcrowding was noted. The transesophageal  probe was passed without difficulty. There were no complications associated  with this procedure. Sedation was administered and monitored by anesthesia. 3D  image acquisition, reconstruction, and real-time interpretation was performed.  Sedation was administered and monitored by anesthesia.     Left Ventricle  Left ventricular size, wall motion and function are normal. The ejection  fraction is 55-60%.     Right Ventricle  The right ventricle is mildly dilated. The right ventricular systolic function  is normal. A leadless pacemaker is visualized within the right ventricle.     Atria  Normal left atrial size. The right atrium is severely dilated. 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 A patent foramen ovale is present. The left atrial  appendage was well visualized and free of thrombus.     Mitral Valve  Mitral valve leaflets appear normal. There  is no evidence of mitral stenosis  or clinically significant mitral regurgitation.     Tricuspid Valve  There is malcoaptation of the tricuspid valve, primarily due to the septal  leaflet which appears small in size. This results in very severe valvular  regurgitation. The other two leaflets appear normal in thickness and mobility.  The coaptation gap on the septal aspect of the tricuspid valve is 16 mm.     Aortic Valve  The aortic valve is trileaflet. The aortic valve is normal in structure and  function.     Pulmonic Valve  The pulmonic valve is normal in structure and function.     Vessels  There is an intimal flap and dissection plane in the aortic arch extending  down the descending aorta. The dissection plane appears filled with thrombus  and is without flow by color doppler.     Pericardial/Pleural  There is no pericardial effusion.     Rhythm  The rhythm was paced.

## 2023-09-15 NOTE — PROGRESS NOTES
"ANTICOAGULATION MANAGEMENT     Priya Funez 82 year old female is on warfarin with supratherapeutic INR result. (Goal INR 2.0-3.0)    Recent labs: (last 7 days)     09/15/23  1448   INR 3.9*       ASSESSMENT     Source(s): Chart Review and Patient/Caregiver Call     Warfarin doses taken: Warfarin taken as instructed  Diet: No new diet changes identified  Medication/supplement changes:  she is just finishing a 5 day course of Bactrim tomorrow. This was an additional 5 days, extended after her UTI symptoms did not improve after the first 5 day course.  New illness, injury, or hospitalization: No  Signs or symptoms of bleeding or clotting: No  Previous result: Subtherapeutic  Additional findings:  Reports that UTI symptoms seem mostly better but she feels a little \"off\". More tired and fatigued and still feels a bit feverish from time to time. She is wondering if this is just a side effect of the abx. Will continue to monitor and aware to contact PCP next week if symptoms continue or worsen or if UTI symptoms return.        PLAN     Recommended plan for temporary change(s) affecting INR     Dosing Instructions: hold dose then continue your current warfarin dose with next INR in 4-7 days       Summary  As of 9/15/2023      Full warfarin instructions:  9/15: Hold; Otherwise 4 mg every Sun, Thu; 2 mg all other days   Next INR check:  9/22/2023               Telephone call with Priya who verbalizes understanding and agrees to plan    Lab visit scheduled    Education provided:   Symptom monitoring: monitoring for bleeding signs and symptoms  Contact [phone]  with any changes, questions or concerns.     Plan made per [facility] anticoagulation protocol    Khalida Gomez RN  Anticoagulation Clinic  9/15/2023    _______________________________________________________________________     Anticoagulation Episode Summary       Current INR goal:  2.0-3.0   TTR:  59.1 % (1 y)   Target end date:  Indefinite   Send INR " reminders to:  [facility]    Indications    Chronic atrial fibrillation (H) [I48.20]  Current use of long term anticoagulation (Resolved) [Z79.01]             Comments:               Anticoagulation Care Providers       Provider Role Specialty Phone number    Sharmaine Burks MD Referring Internal Medicine [phone]

## 2023-09-18 NOTE — TELEPHONE ENCOUNTER
ANTICOAGULATION MANAGEMENT:  Medication Refill    Anticoagulation Summary  As of 9/15/2023      Warfarin maintenance plan:  4 mg (2 mg x 2) every Sun, Thu; 2 mg (2 mg x 1) all other days   Next INR check:  9/22/2023   Target end date:  Indefinite    Indications    Chronic atrial fibrillation (H) [I48.20]  Current use of long term anticoagulation (Resolved) [Z79.01]                 Anticoagulation Care Providers       Provider Role Specialty Phone number    Sharmaine Burks MD Referring Internal Medicine [phone]            Refill Criteria    Visit with referring provider/group: Meets criteria: office visit within referring provider group in the last 1 year on 9/5/23    [facility] referral signed last signed: 10/28/2022; within last year: Yes    Lab monitoring not exceeding 2 weeks overdue: Yes    Priya meets all criteria for refill. Rx instructions and quantity supplied updated to match patient's current dosing plan. Warfarin 90 day supply with 1 refill granted per [facility] protocol     Khalida Gomez RN  Anticoagulation Clinic

## 2023-09-19 PROBLEM — D69.6 THROMBOCYTOPENIA (H): Status: ACTIVE | Noted: 2023-01-01

## 2023-09-19 NOTE — PROGRESS NOTES
"ADDENDUM - 4:21pm    Lab reported platelets 21k. 250 in August.     Will attempt direct admission due to non-emergent nature.    Sharmaine Burks MD   [facility]  [address] [address]  T: [phone], F: [phone]    ASSESSMENT/PLAN                                                      (R23.3) Petechiae  (primary encounter diagnosis)  Comment: etiology unclear, but recent elevated INR may be causing or contributing.  Plan: CBC and CMP today;  [facility] managing INR; if additional areas become involved or current areas do not resolve over the next several weeks, patient to contact MD.    (B23.727) History of UTI  Plan: UA reflex and culture to ensure resolution of UTI.    Sharmaine Burks MD   [facility]  [address] [address]  T: [phone], F: [phone]    SUBJECTIVE                                                      Priya Funez is a very pleasant 82 year old female who presents with a rash:    First noticed yesterday. Involves bilateral lower legs. Asymptomatic. No new soaps, lotions, or stockings. No lower extremity edema.  PMH significant for chronic atrial fibrillation on chronic AC with Coumadin.  Recent INR was above goal: 3.9.    Patient reports feeling more tired than usual, especially at night.  She measures her temperatures at night, not because she is feeling feverish or chilled, but because she is feeling more tired than usual. Temperatures are around 99.    She was recently treated for a UTI (E Coli, treated with Bactrim DS). Course repeated due to ongoing fevers and fatigue. Second course completed several days ago.    OBJECTIVE                                                      /62   Pulse 87   Temp 98.6  F (37  C) (Temporal)   Resp 14   Ht 1.575 m (5' 2\")   Wt 69.6 kg (153 lb 8 oz)   SpO2 98%   BMI 28.08 kg/m    Constitutional: well-appearing  Psych: normal judgment and insight; normal mood and affect; recent " and remote memory intact  Integumentary: multiple petechiae bilateral lower legs; feet spared    ---    (Note documentation was completed, in part, with [employer] voice-recognition software. Documentation was reviewed, but some grammatical, spelling, and word errors may remain.)

## 2023-09-19 NOTE — TELEPHONE ENCOUNTER
Just spoke with the patient that she was a screen fail for the CLASP TR trial. Told her that we will be in touch with Dr. Shea moving forward.     No further questions at this time.     Sierra R. Behr-Holewinski

## 2023-09-20 NOTE — PLAN OF CARE
Goal Outcome Evaluation:       [id number]  Orientation: A&O x4  Activity: SBA  Diet/BS Checks:Reg  Tele:  N/A  IV Access/Drains: R PIV SL  Pain Management: denies  Abnormal VS/Results: platelet 19, INR 2.84, UA results pending. On K+ and mag protocols- AM recheck.   Bowel/Bladder: Continent  Skin/Wounds: Scattered petechiae  Consults: SW, Hem/Onc  D/C Disposition: TBD  Other Info:

## 2023-09-20 NOTE — PROVIDER NOTIFICATION
MD Notification    Notified Person: MD    Notified Person Name: Dr. Katie Harmon    Notification Date/Time: 9/20/2023 9/20/2023    Notification Interaction: [employer] Messaging     Purpose of Notification: Patient is self reporting diarrhea, and wondering if there is anything we can give her? I told her to call me and not flush next time so I can assess characteristics     Orders Received: Collect sample for C Diff, Let's rule out Cdiff before we give her any antidiarrheal agents. Imodium and Lomotil ordered as well.    Comments:

## 2023-09-20 NOTE — PROGRESS NOTES
[facility]    Medicine Progress Note - Hospitalist Service       Date of Admission:  9/19/2023    Assessment & Plan   Priya Funez is a 82 year old female with hx of complex cardiac history including HFpEF, severe TR, chronic a-fib s/p AV node ablation/PPM, aortic dissection s/p repair (2019), HTN, and CKD who was admitted on 9/19/2023 for new onset thrombocytopenia      New onset thrombocytopenia, suspect multifactorial due to Bactrim drug-induced ITP and UTI  No known history of thrombocytopenia  Recently completed course of Bactrim for UTI, did not fully resolve so completed extra week, last dose 9/16. On 9/18 she noticed petechiae to bilateral anterior shins.  She has felt tired but no other associated symptoms. She saw PCP on 9/19 who ordered labs and she was found to have a platelet count of 19,000  Heme/Onc consulted in the ED; recommended IV dexamethasone  - Platelets improved, 27K today  - Conts on dexamethasone 40 mg daily  - Holding PTA aspirin and warfarin for now    Pancytopenia, likely Bactrim drug-induced  WBC 3.0, Hgb 11.6, Plt 27K  - Per Heme/Onc, typically takes 7-10 days to recover blood counts after cessation of Bactrim,     IMAN on CKD stage 3b  Baseline Cr 1.2-1.4. Cr 1.7 on arrival, likely due to Bactrim  - Cr 1.39 today, will follow  - Holding PTA spironolactone and torsemide     Severe tricuspid regurgitation  Heart failure with preserved ejection fraction - EF 55 to 60%  Benign essential hypertension  Was under evaluation for transcutaneous tricuspid valve replacement trial however reportedly she is not a candidate therefore they are considering replacing the valve via thoracic approach.  Last ECHO (9/8/23): EF 55-60%, severe tricuspid regurgitation  PTA regimen: ASA 81 mg every 24 hours, empaglifozin 10 mg daily, metoprolol tartrate 25 mg BID, spironolactone 25 mg daily, torsemide 40 mg daily  - Holding ASA, spironolactone, and torsemide as noted above  - Conts  on metoprolol  - Resume empagliflozin at discharge      Recent supratherapeutic INR  Paroxysmal atrial fibrillation s/p AV node ablation, PPM placement  Rate controlled with metoprolol. Anticoagulated with warfarin   - Holding warfarin as noted above  - Conts on PTA metoprolol    History of aortic dissection s/p emergent repair 2019  Stable     Generalized anxiety disorder  Chronic and stable on escitalopram and mirtazapine      Diet: Combination Diet Regular Diet Adult    DVT Prophylaxis: Pneumatic Compression Devices  Barrientos Catheter: Not present  Code Status: Full Code         Disposition: Expected discharge home tomorrow if CBC stable    The patient's care was discussed with the Patient.    Katie Harmon MD  Hospitalist Service  [facility]  Contact information available via [facility] Paging/Directory    ______________________________________________________________________    Interval History   Admitted last night. Offers no complaints. Denies cp/sob, aches/pains. Petechiae stable. Heme/Onc recs reviewed    Data reviewed today: I reviewed all medications, new labs and imaging results over the last 24 hours. I personally reviewed no images or EKG's today.    Physical Exam   Vital Signs: Temp: 97.9  F (36.6  C) Temp src: Oral BP: 136/75 Pulse: 67   Resp: 16 SpO2: 98 % O2 Device: None (Room air)    Weight: 153 lbs 14.4 oz  Constitutional: Resting comfortably, NAD  HEENT: Sclera white, MMM  Respiratory: Breathing non-labored. Lungs CTAB - no wheezes, crackles, or rhonchi  Cardiovascular: Heart RRR, +diastolic murmur. No pedal edema  GI: +BS, abd soft/NT  Skin/Integument: Petechiae on bilateral shins  Musculoskeletal: Normal muscle bulk and tone  Neuro: Alert and appropriate, VERDIN  Psych: Calm and cooperative    Data   Recent Labs   Lab 09/20/23  0738 09/19/23  2050 09/19/23  1517 09/15/23  1448   WBC 3.0*  --  5.3  --    HGB 11.6*  --  12.5  --    MCV 90  --  90  --    PLT 27*  --  19*  --     INR 2.37* 2.84*  --  3.9*   *  --  134*  --    POTASSIUM 4.3  --  4.2  --    CHLORIDE 96*  --  94*  --    CO2 25  --  29  --    BUN 30.1*  --  31.6*  --    CR 1.39*  --  1.70*  --    ANIONGAP 11  --  11  --    BESS 8.8  --  9.0  --    *  --  94  --    ALBUMIN  --   --  4.0  --    PROTTOTAL  --   --  6.8  --    BILITOTAL  --   --  0.7  --    ALKPHOS  --   --  118*  --    ALT  --   --  17  --    AST  --   --  27  --          No results found for this or any previous visit (from the past 24 hour(s)).    Medications      dexAMETHasone  40 mg Intravenous Q24H    metoprolol tartrate  25 mg Oral BID    mirtazapine  15 mg Oral At Bedtime    senna-docusate  1 tablet Oral BID    Or    senna-docusate  2 tablet Oral BID    sodium chloride (PF)  3 mL Intracatheter Q8H    [Held by provider] spironolactone  25 mg Oral Daily    [Held by provider] torsemide  40 mg Oral Daily

## 2023-09-20 NOTE — PLAN OF CARE
Goal Outcome Evaluation:    Shift: 0700-1930 9/19/2023    Orientation; AO x4  Pain: Reporting no Pain  Vitals/Tele: VSS RA  IV Access/drains: None  Diet: Regular   Mobility: SBA  GI/: Continent   Wound/Skin: Scattered Petechiae   Consults: None this Shift   Discharge Plan: TBD    Additional Info: Patient was at a scheduled appointment this morning, test results came back and Dr. Burks had her come to the hospital for admission.       See Flow sheets for assessment

## 2023-09-20 NOTE — PLAN OF CARE
Goal Outcome Evaluation:  Shift: 0700-1930 9/20/2023    History: Patient was at a scheduled appointment yesterday and had labs drawn, lab results came back and the MD called and had her come to the hospital.   Primary Diagnosis: Thrombocytopenia    ONLY POST BELOW FOR END OF SHIFT NOTE     Orientation: AO x4  Pain: Patient has reported no pain for the entirety of the shift.   Vitals/Tele: VSS RA  IV Access/drains: R PIV SL  Diet: Regular  Mobility: SBA w/Walker (Patients personal walker)  GI/: Continent; Patient self reporting diarrhea, orders in place to rule out C Diff.   Wound/Skin: Scattered Petechiae   Consults: SW, Hem/Onc  Discharge Plan: TBD      See Flow sheets for assessment

## 2023-09-20 NOTE — PHARMACY-ADMISSION MEDICATION HISTORY
Pharmacist Admission Medication History    Admission medication history is complete. The information provided in this note is only as accurate as the sources available at the time of the update.    Medication reconciliation/reorder completed by provider prior to medication history? No    Information Source(s): Patient via in-person    Pertinent Information: Recently completed 5 days course of SMX/TMP for UTI    Changes made to PTA medication list:  Added: None  Deleted: None  Changed: None    Medication Affordability:  Not including over the counter (OTC) medications, was there a time in the past 3 months when you did not take your medications as prescribed because of cost?: No    Allergies reviewed with patient and updates made in EHR: yes    Medication History Completed By: [place] [facility] 9/19/2023 7:12 PM    Prior to Admission medications    Medication Sig Last Dose Taking? Auth Provider Long Term End Date   Acetaminophen 325 MG CAPS Take 325-650 mg by mouth every 4 hours as needed (Pain/Fever)  at PRN Yes Reported, Patient     albuterol (PROAIR HFA/PROVENTIL HFA/VENTOLIN HFA) 108 (90 Base) MCG/ACT inhaler Inhale 2 puffs into the lungs every 6 hours as needed for shortness of breath / dyspnea or wheezing  at PRN Yes Sharmaine Burks MD Yes    aspirin (ASA) 81 MG EC tablet Take 81 mg by mouth every 24 hours 9/19/2023 at AM Yes Reported, Patient     cholecalciferol 25 MCG (1000 UT) TABS Take 1,000 Units by mouth daily  9/19/2023 at AM Yes Reported, Patient     cyanocobalamin (VITAMIN B-12) 1000 MCG tablet Take 1,000 mcg by mouth daily  9/19/2023 at AM Yes Reported, Patient     empagliflozin (JARDIANCE) 10 MG TABS tablet Take 1 tablet (10 mg) by mouth daily Past Week at AM Yes Becky Kwok MD     metoprolol tartrate (LOPRESSOR) 25 MG tablet Take 1 tablet (25 mg) by mouth 2 times daily 9/19/2023 at AM Yes Becky Kwok MD Yes    mirtazapine (REMERON) 15 MG tablet Take 1 tablet (15 mg) by mouth At  Bedtime 9/19/2023 at AM Yes Sharmaine Burks MD Yes    spironolactone (ALDACTONE) 25 MG tablet Take 1 tablet (25 mg) by mouth daily 9/19/2023 at AM Yes Becky Kwok MD Yes    torsemide (DEMADEX) 10 MG tablet Take 4 tablets(40mg) by mouth every day 9/19/2023 at AM Yes Flaquita Orr PA-C Yes    warfarin ANTICOAGULANT (COUMADIN) 2 MG tablet Take 2 to 4mg (1 to 2 tabs) by mouth daily OR AS DIRECTED.  Adjust dose based on INR. 9/18/2023 at PM Yes Sharmaine Burks MD Yes    warfarin ANTICOAGULANT (COUMADIN) 4 MG tablet Take 4 mg by mouth twice a week Thursday & Sunday    For A-Fib  INR Goal 2-3 9/17/2023 at PM Yes Reported, Patient Yes    escitalopram (LEXAPRO) 10 MG tablet Take 1 tablet (10 mg) by mouth daily   Sharmaine Burks MD Yes

## 2023-09-20 NOTE — H&P
[facility]    History and Physical - Hospitalist Service       Date of Admission:  9/19/2023    Assessment & Plan   Priya Funez is a 82 year old female with complicated cardiac history including HFpEF, severe TR, permanent atrial fibrillation status post AV node ablation with pacemaker, emergent aortic dissection status postrepair 2019, hypertension, obesity, CKD stage IIIb, generalized anxiety disorder who was directly admitted on 9/19/2023 with new onset thrombocytopenia.    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  On 9/18 she noticed petechiae to bilateral anterior shins.  She has felt tired but no other associated symptoms.  She saw PCP on 9/19 who ordered labs and she was found to have a platelet count of 19,000 and, previously normal 3 weeks prior.  She was directly admitted for further work-up. 1' suspicion is drug induced immune thrombocytopenia given history.  Hematology called on admission, case discussed and recommended IV dexamethasone as below, no current need for platelet transfusion.  -Inpatient status   -[place] hematology consultation  -IV Dexamethasone 40mg x4 days (discussed with hematology on admission)  -No acute need for IVIG or platelet transfusion at this juncture  -Hold PTA aspirin, all antiplatelets and anticoagulants including PTA warfarin  -Recheck INR now, allow to drift down, consulted 2.84  -Repeat CBC in a.m.    Chronic kidney disease, stage 3b  Baseline Cr 0.2-1.4. On admission, 1.7.  Due to Bactrim.  - Avoid nephrotoxins - contrast, NSAIDs  - Renally dose medications as able/needed  - HOLD PTA diuretics for now and repeat Cr in AM, if improved then would resume   - Monitor    Severe tricuspid regurgitation  Heart failure with preserved ejection fraction - EF 55 to 60%  Benign essential hypertension  Was under evaluation for  transcutaneous tricuspid valve replacement trial however reportedly she is not a candidate therefore they are considering replacing the valve via thoracic approach.  * Last ECHO: EF 55-60%, severe tricuspid regurgitation  PTA Regimen: Aspirin 81 mg every 24 hours, Jardiance 10 mg daily, metoprolol tartrate 25 mg twice daily, spironolactone 25 mg daily, torsemide 40 mg daily  - Intake/Output  - Daily standing weights if able  - Continue PTA beta blocker and ACEi/ARB/ARNI with hold parameters  - Hold PTA while watching renal function, if stable on 9/20, would resume and avoid any time off of diuretics if able given severe tricuspid regurgitation      Wt Readings from Last 4 Encounters:   09/19/23 69.6 kg (153 lb 8 oz)   09/11/23 69.8 kg (153 lb 12.8 oz)   09/08/23 68.6 kg (151 lb 3.2 oz)   09/05/23 69.8 kg (153 lb 12.8 oz)       Recent supratherapeutic INR  Paroxysmal atrial fibrillation status post AV node ablation and pacemaker, on chronic anticoagulation  Rate controlled with metoprolol. Anticoagulated with warfarin   -Continue PTA beta blocker with hold parameters  -HOLD PTA anticoagulation due to thrombocytopenia above  -Daily INR, allow to drift down for now, resulted 2.84  -PRN IV metoprolol for sustained heart rate >120  -Monitor K+/Mg++, replace PRN    History of aortic dissection status post emergent repair 2019    Generalized anxiety disorder: Continue PTA mirtazapine, unclear if taking Lexapro, reconcile on 9/20 as able       Diet: Combination Diet Regular Diet Adult    DVT Prophylaxis: Pneumatic Compression Devices and Allow INR to drift down  Barrientos Catheter: Not present  Lines: None     Cardiac Monitoring: None  Code Status: Full Code      Clinically Significant Risk Factors Present on Admission                 # Drug Induced Coagulation Defect: home medication list includes an anticoagulant medication    # Drug Induced Platelet Defect: home medication list includes an antiplatelet medication     #  "Hypertension: Noted on problem list      # Overweight: Estimated body mass index is 28.08 kg/m  as calculated from the following:    Height as of an earlier encounter on 9/19/23: 1.575 m (5' 2\").    Weight as of an earlier encounter on 9/19/23: 69.6 kg (153 lb 8 oz).        # Pacemaker present  # History of CABG: noted on surgical history       Disposition Plan      Expected Discharge Date: 09/21/2023                The patient's care was discussed with the Attending Physician, Dr. Coyne, Bedside Nurse, Patient, Patient's Family, and hematology Consultant(s).    Pam Hankins PA-C  Hospitalist Service  [facility]  Securely message with [employer] (more info)  Text page via [facility] Paging/Directory     ______________________________________________________________________    Chief Complaint   Petechiae    History is obtained from the patient and electronic health record    History of Present Illness   Priya Funez is a 82 year old female with past medical history significant for completed cardiac history including complicated cardiac history including HFpEF, severe TR, permanent atrial fibrillation status post AV node ablation with pacemaker, emergent aortic dissection status postrepair 2019, hypertension, obesity, CKD stage IIIb, generalized anxiety disorder who was recently treated with Bactrim for urinary tract infection and subsequently presented to PCP day of admission with new onset petechiae to lower extremities.  Patient treated with a week of Bactrim however UTI and resolved so course was extended and last dose 9/16.  Patient also takes warfarin for atrial fibrillation recently had an elevated INR likely attributable to Bactrim.  9/18 the patient noticed red dots to her lower shins prompting PCP visit day of admission.  Seen by Dr. Sharmaine Burks who ordered lab work which revealed platelets of 19,000, previously normal just 3 weeks prior.  UA noninfectious.  INR 3.9 9/15.  CMP " notable for bump in creatinine to 1.7 up from recent baseline 1.2-1.4, sodium 134.  Liver enzymes at baseline, normal bilirubin.  Dr. Burks arranged for direct admission to [facility] for further evaluation of new onset thrombocytopenia.    On arrival to [facility], the patient is hemodynamically stable.  She reports she has felt tired but otherwise been able to go on with her day.  No nausea or vomiting.  No changes to bowel or bladder.  UTI symptoms improved.  She noticed the petechiae yesterday when she is at a class reunion.  She denies any bleeding acutely.  No abdominal pain.  No shortness of breath.  She has never had anything like this before.  I called hematology on arrival who recommended IV dexamethasone 40 mg daily x4 days.  She reports we can hold on IVIG at this time as well as no acute need for urgent transfusion.    She endorses a very complicated cardiac history and states she was to see cardiology this week to discuss her tricuspid valve replacement.  Was disappointed to learn she did not qualify for trial for a transcutaneous approach.      Past Medical History    Past Medical History:   Diagnosis Date    Benign essential hypertension     Cardiac pacemaker in situ     [employer]    Chronic atrial fibrillation (H)     s/p AV node ablation and PPM placement January, 2019    Chronic diastolic heart failure (H)     Chronic kidney disease, stage III (moderate) (H)     Chronic right-sided heart failure (H)     NEDA (generalized anxiety disorder)     History of aortic dissection     s/p emergent repair 2019    History of blood transfusion     no adverse reactions    Severe tricuspid regurgitation        Past Surgical History   Past Surgical History:   Procedure Laterality Date    ANGIOGRAM Right 01/04/2019    Procedure: DIAGONSTIC ANGIOGRAM OF SMA;  Surgeon: Rigo Jennings MD;  Location: [facility] OR    BIOPSY BREAST      BYPASS GRAFT ARTERY CORONARY, REPAIR VALVE AORTIC, COMBINED N/A 01/04/2019     Procedure: AORTIC DISSECTION REPAIR WITH GRAFT- HEMASHIELD PLATINUM WOVEN DOUBLE VELOR 4 SIDE ARM VASCULAR GRAFT, D:35 X 12 X 10 X 10MM/ L:50CM;  Surgeon: Jose Winslow MD;  Location:  OR    CV CORONARY ANGIOGRAM N/A 8/29/2023    Procedure: Coronary Angiogram;  Surgeon: Ar Morales MD;  Location:  HEART CARDIAC CATH LAB    CV PCI N/A 8/29/2023    Procedure: Percutaneous Coronary Intervention;  Surgeon: Ar Morales MD;  Location:  HEART CARDIAC CATH LAB    CV RIGHT HEART CATH MEASUREMENTS RECORDED N/A 8/29/2023    Procedure: Right Heart Catheterization;  Surgeon: Ar Morales MD;  Location:  HEART CARDIAC CATH LAB    EP ABLATION AV NODE N/A 01/11/2019    Procedure: EP Ablation AV Node;  Surgeon: Tsering Davey MD;  Location:  HEART CARDIAC CATH LAB    EP LEAD EXTRACTION  7/31/2023    EP PACEMAKER Left 01/11/2019    Procedure: EP Pacemaker;  Surgeon: Tsering Davey MD;  Location:  HEART CARDIAC CATH LAB    EP PACEMAKER LEADLESS DEVICE IMPLANT  7/31/2023    EP PACEMAKER LEADLESS DEVICE IMPLANT N/A 7/31/2023    Procedure: Pacemaker Leadless Device Implant;  Surgeon: Gaston Car MD;  Location: [facility] OR    THYROID SURGERY      benign mass removed    TRACHEOSTOMY N/A 01/25/2019    Procedure: TRACHEOSTOMY  (DR MCCLELLAND);  Surgeon: Bryson Mcclelland MD;  Location:  OR    TRANSESOPHAGEAL ECHOCARDIOGRAM INTRAOPERATIVE N/A 6/16/2023    Procedure: ECHOCARDIOGRAM,TRANSESOPHAGEAL,WITH ANESTHESIA;  Surgeon: Dennis Meier DO;  Location: [facility] OR    TRANSESOPHAGEAL ECHOCARDIOGRAM INTRAOPERATIVE N/A 9/8/2023    Procedure: ECHOCARDIOGRAM,TRANSESOPHAGEAL,WITH ANESTHESIA;  Surgeon: Rios Heredia MD;  Location: [facility] OR    TUBAL LIGATION         Prior to Admission Medications   Prior to Admission Medications   Prescriptions Last Dose Informant Patient Reported? Taking?   Acetaminophen 325 MG CAPS  at PRN  Yes Yes   Sig: Take 325-650 mg by mouth every 4  hours as needed (Pain/Fever)   albuterol (PROAIR HFA/PROVENTIL HFA/VENTOLIN HFA) 108 (90 Base) MCG/ACT inhaler  at PRN  No Yes   Sig: Inhale 2 puffs into the lungs every 6 hours as needed for shortness of breath / dyspnea or wheezing   aspirin (ASA) 81 MG EC tablet 9/19/2023 at AM  Yes Yes   Sig: Take 81 mg by mouth every 24 hours   cholecalciferol 25 MCG (1000 UT) TABS 9/19/2023 at AM  Yes Yes   Sig: Take 1,000 Units by mouth daily    cyanocobalamin (VITAMIN B-12) 1000 MCG tablet 9/19/2023 at AM  Yes Yes   Sig: Take 1,000 mcg by mouth daily    empagliflozin (JARDIANCE) 10 MG TABS tablet Past Week at AM  No Yes   Sig: Take 1 tablet (10 mg) by mouth daily   escitalopram (LEXAPRO) 10 MG tablet   No No   Sig: Take 1 tablet (10 mg) by mouth daily   metoprolol tartrate (LOPRESSOR) 25 MG tablet 9/19/2023 at AM  No Yes   Sig: Take 1 tablet (25 mg) by mouth 2 times daily   mirtazapine (REMERON) 15 MG tablet 9/19/2023 at AM  No Yes   Sig: Take 1 tablet (15 mg) by mouth At Bedtime   spironolactone (ALDACTONE) 25 MG tablet 9/19/2023 at AM  No Yes   Sig: Take 1 tablet (25 mg) by mouth daily   torsemide (DEMADEX) 10 MG tablet 9/19/2023 at AM  No Yes   Sig: Take 4 tablets(40mg) by mouth every day   warfarin ANTICOAGULANT (COUMADIN) 2 MG tablet 9/18/2023 at PM  No Yes   Sig: Take 2 to 4mg (1 to 2 tabs) by mouth daily OR AS DIRECTED.  Adjust dose based on INR.   warfarin ANTICOAGULANT (COUMADIN) 4 MG tablet 9/17/2023 at PM  Yes Yes   Sig: Take 4 mg by mouth twice a week Thursday & Sunday    For A-Fib  INR Goal 2-3      Facility-Administered Medications: None        Review of Systems    The 10 point Review of Systems is negative other than noted in the HPI or here.     Social History   I have reviewed this patient's social history and updated it with pertinent information if needed.  Social History     Tobacco Use    Smoking status: Never    Smokeless tobacco: Never   Vaping Use    Vaping Use: Never used   Substance Use Topics     Alcohol use: Not Currently    Drug use: No         Family History   I have reviewed this patient's family history and updated it with pertinent information if needed.  Family History   Problem Relation Age of Onset    Ataxia Mother     Heart Disease Father     Diabetes No family hx of     Myocardial Infarction No family hx of     Cerebrovascular Disease No family hx of     Coronary Artery Disease Early Onset No family hx of     Breast Cancer No family hx of     Colon Cancer No family hx of     Ovarian Cancer No family hx of          Allergies   Allergies   Allergen Reactions    Amoxicillin Swelling     Face and body    Ciprofloxacin Hives        Physical Exam   Vital Signs: Temp: 98.2  F (36.8  C) Temp src: Oral BP: 104/63 Pulse: 62   Resp: 16 SpO2: 95 % O2 Device: None (Room air)    Weight: 0 lbs 0 oz    General: Awake, alert, very pleasant woman who appears stated age. Looks comfortable sitting up in bed. No acute distress.  HEENT: Normocephalic, atraumatic. Extraocular movements intact.   Respiratory: Clear to auscultation bilaterally, no rales, wheezing, or rhonchi.  No increased work of breathing.  Breathing comfortably on room air.  Cardiovascular: Regular rate and rhythm, +S1 and S2, no murmur auscultated.  Generalized nonpitting peripheral edema.   Gastrointestinal: Soft, non-tender, non-distended. Bowel sounds present.  Skin: Warm, dry.  Petechiae scattered across bilateral shins dorsalis pedis pulses palpable bilaterally.  Musculoskeletal: No joint swelling, erythema or tenderness. Moves all extremities equally.  Neurologic: AAO x3.   Psychiatric: Appropriate mood and affect. No obvious anxiety or depression.      Medical Decision Making       60 MINUTES SPENT BY ME on the date of service doing chart review, history, exam, documentation & further activities per the note.      Data     I have personally reviewed the following data over the past 24 hrs:    5.3  \   12.5   / 19 (LL)     134 (L) 94 (L) 31.6  (H) /  94   4.2 29 1.70 (H) \     ALT: 17 AST: 27 AP: 118 (H) TBILI: 0.7   ALB: 4.0 TOT PROTEIN: 6.8 LIPASE: N/A     INR:  2.84 (H) PTT:  N/A   D-dimer:  N/A Fibrinogen:  N/A     Imaging results reviewed over the past 24 hrs:   No results found for this or any previous visit (from the past 24 hour(s)).

## 2023-09-20 NOTE — CONSULTS
[facility]    Hematology/Oncology Consult Note      Date of Admission:  9/19/2023  Date of Consult:  09/20/23  Reason for Consult: thrombocytopenia, new onset      ASSESSMENT/PLAN:  Priya is a  82 year old female with new onset thrombocytopenia    Combination of Bactrim drug-induced pancytopenia and ITP may be related to urinary tract infection or drug-induced ITP    -Check vitamin VITAMIN B12  -Normally after cessation of Bactrim with pancytopenia take 7 to 10 days to recover blood counts  -INR today 2.37 would recommend starting anticoagulation once platelets are above 30,000 as risk of bleeding would be lower  -I think I would recommend patient stays here for another day to see what her counts are doing.  I will add a differential for tomorrow to make sure she is not neutropenic  -We will continue dexamethasone 40 mg IV for 4 days day 2 today no need for IVIG at this time as platelets are coming up  -need to update her cardiologis t Dr whitlock Per patient      I have sent a message via epic but please consult cardiology so they can decide on anticoagulation plan on when to resume. Normally we recommend >30K     Total time spent on day of visit, including review of tests, obtaining/reviewing separately obtained history, ordering medications/tests/procedures, communicating with PCP/consultants, and documenting in electronic medical record: 80 minutes         HISTORY OF PRESENT ILLNESS: Priya is a 82 year old female who we are asked to evaluate for new onset of thrombocytopenia.  Patient had blood work completed 3 weeks ago which showed a platelet count of 250 hemoglobin and white cells were normal.  She recently was diagnosed with a urinary tract infection and treated with Bactrim on 9/5/2023.  Blood work completed yesterday by her primary care physician showed a platelet count of 19,000 decreased from 250,000 a normal white count and hemoglobin.  I was called last night and patient was  started on Decadron.  Her creatinine slightly elevated from baseline 1.3 to 1.7 She received 40 mg of IV dexamethasone yesterday today her platelet count is at 27,000.  It was presumed she had a diagnosis of ITP d infection or drug-induced versus pancytopenia related to Bactrim.  She may have a combination of both.  Today her white count is at 3000 hemoglobin 11.6 platelet count of 27,000.  Differential was not obtained      9/12 she was given another course of 5 days of antibiotics and then presented for repeat ua and labs yesterday and was found to have low platelets  Other comorbidities include atrial fibrillation on chronic warfarin         REVIEW OF SYSTEMS:   14 point ROS was reviewed and is negative other than as noted above in HPI.       MEDICATIONS:  Current Facility-Administered Medications   Medication    acetaminophen (TYLENOL) tablet 650 mg    Or    acetaminophen (TYLENOL) Suppository 650 mg    albuterol (PROVENTIL HFA/VENTOLIN HFA) inhaler    bisacodyl (DULCOLAX) suppository 10 mg    dexAMETHasone (DECADRON) 40 mg in sodium chloride 0.9 % 59 mL intermittent infusion    HYDROmorphone (DILAUDID) injection 0.2 mg    HYDROmorphone (DILAUDID) injection 0.4 mg    lidocaine (LMX4) cream    lidocaine 1 % 0.1-1 mL    melatonin tablet 1 mg    metoprolol tartrate (LOPRESSOR) tablet 25 mg    mirtazapine (REMERON) tablet TABS 15 mg    naloxone (NARCAN) injection 0.2 mg    Or    naloxone (NARCAN) injection 0.4 mg    Or    naloxone (NARCAN) injection 0.2 mg    Or    naloxone (NARCAN) injection 0.4 mg    ondansetron (ZOFRAN ODT) ODT tab 4 mg    Or    ondansetron (ZOFRAN) injection 4 mg    oxyCODONE (ROXICODONE) tablet 5 mg    oxyCODONE IR (ROXICODONE) half-tab 2.5 mg    prochlorperazine (COMPAZINE) injection 5 mg    Or    prochlorperazine (COMPAZINE) tablet 5 mg    Or    prochlorperazine (COMPAZINE) suppository 12.5 mg    senna-docusate (SENOKOT-S/PERICOLACE) 8.6-50 MG per tablet 1 tablet    Or    senna-docusate  (SENOKOT-S/PERICOLACE) 8.6-50 MG per tablet 2 tablet    sodium chloride (PF) 0.9% PF flush 3 mL    sodium chloride (PF) 0.9% PF flush 3 mL    sodium phosphate (FLEET ENEMA) 1 enema    [Held by provider] spironolactone (ALDACTONE) tablet 25 mg    [Held by provider] torsemide (DEMADEX) tablet 40 mg         ALLERGIES:  Allergies   Allergen Reactions    Amoxicillin Swelling     Face and body    Ciprofloxacin Hives         PAST MEDICAL HISTORY:  Past Medical History:   Diagnosis Date    Benign essential hypertension     Cardiac pacemaker in situ     Medtronic    Chronic atrial fibrillation (H)     s/p AV node ablation and PPM placement January, 2019    Chronic diastolic heart failure (H)     Chronic kidney disease, stage III (moderate) (H)     Chronic right-sided heart failure (H)     NEDA (generalized anxiety disorder)     History of aortic dissection     s/p emergent repair 2019    History of blood transfusion     no adverse reactions    Severe tricuspid regurgitation          PAST SURGICAL HISTORY:  Past Surgical History:   Procedure Laterality Date    ANGIOGRAM Right 01/04/2019    Procedure: DIAGONSTIC ANGIOGRAM OF SMA;  Surgeon: Rigo Jennings MD;  Location:  OR    BIOPSY BREAST      BYPASS GRAFT ARTERY CORONARY, REPAIR VALVE AORTIC, COMBINED N/A 01/04/2019    Procedure: AORTIC DISSECTION REPAIR WITH GRAFT- HEMASHIELD PLATINUM WOVEN DOUBLE VELOR 4 SIDE ARM VASCULAR GRAFT, D:35 X 12 X 10 X 10MM/ L:50CM;  Surgeon: Jose Winslow MD;  Location:  OR    CV CORONARY ANGIOGRAM N/A 8/29/2023    Procedure: Coronary Angiogram;  Surgeon: Ar Morales MD;  Location:  HEART CARDIAC CATH LAB    CV PCI N/A 8/29/2023    Procedure: Percutaneous Coronary Intervention;  Surgeon: Ar Morales MD;  Location:  HEART CARDIAC CATH LAB    CV RIGHT HEART CATH MEASUREMENTS RECORDED N/A 8/29/2023    Procedure: Right Heart Catheterization;  Surgeon: Ar Morales MD;  Location: [facility]  CARDIAC CATH LAB    EP ABLATION AV NODE N/A 01/11/2019    Procedure: EP Ablation AV Node;  Surgeon: Tsering Davey MD;  Location:  HEART CARDIAC CATH LAB    EP LEAD EXTRACTION  7/31/2023    EP PACEMAKER Left 01/11/2019    Procedure: EP Pacemaker;  Surgeon: Tsering Davey MD;  Location:  HEART CARDIAC CATH LAB    EP PACEMAKER LEADLESS DEVICE IMPLANT  7/31/2023    EP PACEMAKER LEADLESS DEVICE IMPLANT N/A 7/31/2023    Procedure: Pacemaker Leadless Device Implant;  Surgeon: Gaston Car MD;  Location: [facility] OR    THYROID SURGERY      benign mass removed    TRACHEOSTOMY N/A 01/25/2019    Procedure: TRACHEOSTOMY  (DR MCCLELLAND);  Surgeon: Bryson Mcclelland MD;  Location:  OR    TRANSESOPHAGEAL ECHOCARDIOGRAM INTRAOPERATIVE N/A 6/16/2023    Procedure: ECHOCARDIOGRAM,TRANSESOPHAGEAL,WITH ANESTHESIA;  Surgeon: Dennis Meier DO;  Location: [facility] OR    TRANSESOPHAGEAL ECHOCARDIOGRAM INTRAOPERATIVE N/A 9/8/2023    Procedure: ECHOCARDIOGRAM,TRANSESOPHAGEAL,WITH ANESTHESIA;  Surgeon: Rios Heredia MD;  Location: [facility] OR    TUBAL LIGATION           SOCIAL HISTORY:  Social History     Socioeconomic History    Marital status:      Spouse name: Not on file    Number of children: Not on file    Years of education: Not on file    Highest education level: Not on file   Occupational History    Occupation: Retired - customer service   Tobacco Use    Smoking status: Never    Smokeless tobacco: Never   Vaping Use    Vaping Use: Never used   Substance and Sexual Activity    Alcohol use: Not Currently    Drug use: No    Sexual activity: Not on file   Other Topics Concern    Parent/sibling w/ CABG, MI or angioplasty before 65F 55M? Not Asked   Social History Narrative    .    Lives in home with . Fully independent.    4 adult children.    8 grandchildren.    Walks 2-3x/week.     Social Determinants of Health     Financial Resource Strain: Not on file   Food Insecurity: Not on file  "  Transportation Needs: Not on file   Physical Activity: Not on file   Stress: Not on file   Social Connections: Not on file   Interpersonal Safety: Not on file   Housing Stability: Not on file         FAMILY HISTORY:  Family History   Problem Relation Age of Onset    Ataxia Mother     Heart Disease Father     Diabetes No family hx of     Myocardial Infarction No family hx of     Cerebrovascular Disease No family hx of     Coronary Artery Disease Early Onset No family hx of     Breast Cancer No family hx of     Colon Cancer No family hx of     Ovarian Cancer No family hx of          PHYSICAL EXAM:  Vital signs:  Temp: 97.9  F (36.6  C) Temp src: Oral BP: 136/75 Pulse: 67   Resp: 16 SpO2: 98 % O2 Device: None (Room air)     Weight: 69.8 kg (153 lb 14.4 oz)  Estimated body mass index is 28.15 kg/m  as calculated from the following:    Height as of an earlier encounter on 23: 1.575 m (5' 2\").    Weight as of this encounter: 69.8 kg (153 lb 14.4 oz).    ECO  GENERAL/CONSTITUTIONAL: No acute distress.  EYES: Pupils are equal, round, and react to light and accommodation. Extraocular movements intact.  No scleral icterus.  ENT/MOUTH: Neck supple. Oropharynx clear, no mucositis.  LYMPH: No anterior cervical, posterior cervical, supraclavicular, axillary or inguinal adenopathy.   RESPIRATORY: Clear to auscultation bilaterally. No crackles or wheezing.   CARDIOVASCULAR: Regular rate and rhythm without murmurs, gallops, or rubs.  GASTROINTESTINAL: No hepatosplenomegaly, masses, or tenderness. The patient has normal bowel sounds. No guarding.  No distention.  MUSCULOSKELETAL: Warm and well-perfused, no cyanosis, clubbing, or edema.  NEUROLOGIC: Cranial nerves II-XII are intact. Alert, oriented, answers questions appropriately.  INTEGUMENTARY: No rashes or jaundice.  GAIT: Steady, does not use assistive device      LABS:  CBC RESULTS:   Recent Labs   Lab Test 23  0738   WBC 3.0*   RBC 3.96   HGB 11.6*   HCT 35.6 "   MCV 90   MCH 29.3   MCHC 32.6   RDW 14.1   PLT 27*       Recent Labs   Lab Test 09/20/23  0738 09/19/23  1517   * 134*   POTASSIUM 4.3 4.2   CHLORIDE 96* 94*   CO2 25 29   ANIONGAP 11 11   * 94   BUN 30.1* 31.6*   CR 1.39* 1.70*   BESS 8.8 9.0         PATHOLOGY:    none  IMAGING:  None           Thank you for the opportunity to participate in this patient's care.  Please call with any questions.    Jude Stratton MD  Hematology/Oncology  [facility]

## 2023-09-21 PROBLEM — I50.32 CHRONIC DIASTOLIC CONGESTIVE HEART FAILURE (H): Status: ACTIVE | Noted: 2023-01-01

## 2023-09-21 PROBLEM — A04.72 C. DIFFICILE COLITIS: Status: ACTIVE | Noted: 2023-01-01

## 2023-09-21 NOTE — PLAN OF CARE
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 PO vanco QID, stopped right now     Vitals: VSS on R A     IV Access/drains: No IV access.     Diet: Regular     Mobility: SBA G,B/W     GI/: Continent     Wound/Skin: some old scattered, bruises and scabs     Consults: Hematology-Oncology Follow-up Note  [facility]     Discharge Plan: discharge at home with her family right now.       See Flow sheets for assessment

## 2023-09-21 NOTE — PROGRESS NOTES
Hematology-Oncology Follow-up Note  [facility]     Today's Date: 09/21/23  Date of Admission:  9/19/2023  Reason for Consult: thrombocytopenia    ASSESSMENT:  Priya Funez is a 82 year old female with history of tricuspid regurgitation and atrial fibrillation and CKD who was recently treated with Bactrim for UTI; now presenting with thrombocytopenia consistent with ITP.    PLAN:   - Okay to discharge  - Finish 4 days of dex on discharge  - Repeat CBC in hematology clinic early next week (ordered)    Ghada Mallory PA-C (cell: [phone])    # ITP  - Patient prescribed Bactrim appropriately for sensitive UTI, initially refractory, then repeated course through 9/16  - At follow-up for UTI she had petechia and CBC showed platelets 19K  - She was started on dex 40 mg IV x 4 9/19 with improvement now to 72K; we can complete oral dex on discharge  - We will repeat CBC with our clinic early next week; we will implement additional steroids or IVIG if needed  - Recent mild anemia and leukopenia and possibly increased creatinine likely also due to Bactrim    # Cardiac History  - From a hematology standpoint, would be okay to resume Warfarin with platelets >30K  - I have asked the patient to hold the aspirin until CBC recheck next week incase of repeat decline    INTERIM HISTORY:  Doing well. No new bleeding. Petechia improving. Tolerating the steroids well.     HISTORY OF PRESENT ILLNESS:  Priya is an 82 year old female who we are asked to evaluate for new onset of thrombocytopenia. She had normal CBC 3 weeks prior to admission. She recently was diagnosed with UTI and completed 2 courses of Bactrim, last 9/5. She presented to Dr. Burks PCP with petechia and CBC showed platelets down to 19K from 250K. She was directed to the ED. She was started on dex 40 mg IV daily x 4 on 9/19. She has had significant improvement in platelets after 2 doses.  Also noted to have mild leukopenia and anemia likely  "secondary to Bactrim myelosuppression, now improved.       MEDICATIONS:  Reviewed     PHYSICAL EXAM:  Vital signs:  Temp: 98  F (36.7  C) Temp src: Oral BP: 114/70 Pulse: 61   Resp: 18 SpO2: 90 % O2 Device: None (Room air)     Weight: 70.1 kg (154 lb 9.6 oz)  Estimated body mass index is 28.28 kg/m  as calculated from the following:    Height as of an earlier encounter on 9/19/23: 1.575 m (5' 2\").    Weight as of this encounter: 70.1 kg (154 lb 9.6 oz).    General: Pleasant patient in no acute distress.  Skin: Warm and dry. Trace petechia to legs.  ENT: No evidence of mucosal bleeding.   Lungs: Normal work of breathing.  Abdomen: Non-distended, no HSM.  Extremities: Gross movement intact without difficulty.  Mental: Calm, cooperative, appropriate. Mood euthymic.  Neuro: Cranial nerves and coordination grossly intact. Alert and oriented x 3.    LABS:  Recent Labs   Lab Test 09/21/23  0744   *   POTASSIUM 4.2   CHLORIDE 98   CO2 24   ANIONGAP 13   BUN 28.5*   CR 1.24*   *   BESS 9.4     Recent Labs   Lab Test 09/21/23  0744 09/20/23  0738 07/15/20  0929 04/04/19  1644   WBC 6.2 3.0*   < > 6.7   HGB 11.8 11.6*   < > 9.1*   PLT 72* 27*   < > 339   MCV 90 90   < > 86   NEUTROPHIL 89  --   --  64.5    < > = values in this interval not displayed.     Recent Labs   Lab Test 09/19/23  1517 08/30/23  1406 01/24/19  1730 01/23/19  0415   BILITOTAL 0.7 0.8   < >  --    ALKPHOS 118* 127*   < >  --    ALT 17 16   < >  --    AST 27 24   < >  --    ALBUMIN 4.0 4.2   < >  --    LDH  --   --   --  296*    < > = values in this interval not displayed.     Ghada Mallory PA-C  [facility]   [phone]      "

## 2023-09-21 NOTE — TELEPHONE ENCOUNTER
Health Call Center    Phone Message    May a detailed message be left on voicemail: yes     Reason for Call: Other: Priya called requesting to speak with a member of her care team about a trial she had been dropped from. Please reach out to Priya to discuss. Thank you!      Action Taken: Other: cardiology    Travel Screening: Not Applicable    Thank you!  [facility]

## 2023-09-21 NOTE — TELEPHONE ENCOUNTER
ANTICOAGULATION  MANAGEMENT: Discharge Review    Priya Funez chart reviewed for anticoagulation continuity of care    Hospital Admission on 9/19-9/21/23 for thrombocytopenia.    Discharge disposition: Home    Results:    Recent labs: (last 7 days)     09/15/23  1448 09/19/23  2050 09/20/23  0738 09/21/23  0744   INR 3.9* 2.84* 2.37* 2.10*     Anticoagulation inpatient management:     held warfarin due to thrombocytopenia until platelets >30k  and resumed on discharge  as platelets were 72k    Anticoagulation discharge instructions:     Warfarin dosing: home regimen continued   Bridging: No   INR goal change: No      Medication changes affecting anticoagulation: Yes: prednisone x 1 day    Additional factors affecting anticoagulation: No     PLAN     No adjustment to anticoagulation plan needed    Recommended follow up is scheduled    Anticoagulation Calendar updated    Marcelino Palomares RN

## 2023-09-21 NOTE — DISCHARGE INSTRUCTIONS
Hold off on Lasix and spironolactone for now. Resume once your diarrhea resolves, as early as tomorrow - Sept 22

## 2023-09-21 NOTE — CONSULTS
Care Management Initial Consult    General Information  Assessment completed with: Priya Johnson  Type of CM/SW Visit: Initial Assessment    Primary Care Provider verified and updated as needed: Yes   Readmission within the last 30 days: no previous admission in last 30 days      Reason for Consult: discharge planning  Advance Care Planning:            Communication Assessment  Patient's communication style: spoken language (English or Bilingual)    Hearing Difficulty or Deaf: no   Wear Glasses or Blind: yes    Cognitive  Cognitive/Neuro/Behavioral: WDL                      Living Environment:   People in home: spouse  Raphael  Current living Arrangements: house      Able to return to prior arrangements: yes       Family/Social Support:  Care provided by: self  Provides care for: no one  Marital Status:   , Children  Raphael       Description of Support System: Supportive, Involved         Current Resources:   Patient receiving home care services: No     Community Resources: None  Equipment currently used at home: grab bar, toilet, grab bar, tub/shower, cane, straight, tub bench, walker, standard  Supplies currently used at home: None    Employment/Financial:  Employment Status: retired        Financial Concerns: No concerns identified   Referral to Financial Worker: No       Does the patient's insurance plan have a 3 day qualifying hospital stay waiver?  No    Lifestyle & Psychosocial Needs:  Social Determinants of Health     Food Insecurity: Not on file   Depression: Not at risk (3/16/2023)    PHQ-2     PHQ-2 Score: 0   Housing Stability: Not on file   Tobacco Use: Low Risk  (9/19/2023)    Patient History     Smoking Tobacco Use: Never     Smokeless Tobacco Use: Never     Passive Exposure: Not on file   Financial Resource Strain: Not on file   Alcohol Use: Not At Risk (1/4/2019)    AUDIT-C     Frequency of Alcohol Consumption: Never     Average Number of Drinks: Not on file     Frequency of Binge  Drinking: Not on file   Transportation Needs: Not on file   Physical Activity: Not on file   Interpersonal Safety: Not on file   Stress: Not on file   Social Connections: Not on file       Functional Status:  Prior to admission patient needed assistance:   Dependent ADLs:: Independent  Dependent IADLs:: Independent       Mental Health Status:          Chemical Dependency Status:                Values/Beliefs:  Spiritual, Cultural Beliefs, Protestant Practices, Values that affect care:                 Additional Information:  Per consult for discharge planning, met with patient to discuss discharge plan of home with Hematology follow-up.  Per patient prior to admit, she was independent with her ADLs & IADLs.  Patient reports using a walker with ambulation.  Discussed that she will need to follow-up with a [place] follow-up and the [facility] clinic will call her with this scheduled appointment.  Messaged Elvira Layton RNCC for Dr. Stratton requesting patient be scheduled for follow-up.  Add [facility] address and phone number to pt's AVS.   Patient's spouse will transport pt home today.  Inpatient Care Coordinator   KIKE Rodarte RN, BSN, OCN   Inpatient Care Coordination [address]  Office: [phone]

## 2023-09-21 NOTE — PLAN OF CARE
Goal Outcome Evaluation:       Orientation: AO x4  Pain: denies  Vitals/Tele: VSS RA  IV Access/drains: R PIV SL  Diet: Regular  Mobility: SBA w/Walker (Patients personal walker)  GI/: Continent; Stool culture came back positive for C diff PCR-provider paged. PO vanco QID  Wound/Skin: Scattered Petechiae   Consults: SW, Hem/Onc  Discharge Plan: Potential discharge today pending CBC

## 2023-09-21 NOTE — PROVIDER NOTIFICATION
Brief update    Paged regarding positive C. difficile results.    PCR was positive, but antigens were negative.     more suspicious for colonization.    Thierry Mata MD  4:58 AM

## 2023-09-21 NOTE — PROGRESS NOTES
Care Management Discharge Note    Discharge Date: 09/21/2023       Discharge Disposition: Home    Discharge Services: None    Discharge DME: None    Discharge Transportation: family or friend will provide    Private pay costs discussed: Not applicable    Does the patient's insurance plan have a 3 day qualifying hospital stay waiver?  No    PAS Confirmation Code:    Patient/family educated on Medicare website which has current facility and service quality ratings: no    Education Provided on the Discharge Plan:    Persons Notified of Discharge Plans: bedside RN  Patient/Family in Agreement with the Plan: yes    Handoff Referral Completed: Yes    Additional Information:  Patient ready for discharge home today.  Messaged RNCC for Dr. Stratton to get pt's follow-up with Hematology.  Patient's spouse will transport pt home.    Addendum:  Scheduled pt's hematology follow-up-      Sep 28, 2023  1:00 PM  Return Patient with Ghada Mallory PA-C  [facility] [place] ([facility] )   Patient was called and informed of her appointment.  KIKE Rodarte RN, BSN, OCN   Inpatient Care Coordination [address]  Office: [phone]

## 2023-09-21 NOTE — PLAN OF CARE
Goal Outcome Evaluation:       MD Notification    Notified Person: MD    Notified Person Name: Mata    Notification Date/Time: 0453     Notification Interaction: "[employer]" messaging    Purpose of Notification: 813 DN had a C Diff toxin B by PCR come back positive- any new orders?    Orders Received: No new orders    Comments:

## 2023-09-21 NOTE — DISCHARGE SUMMARY
[facility]  Hospitalist Discharge Summary      Date of Admission:  9/19/2023  Date of Discharge:  9/21/2023  Discharging Provider: Katie Harmon MD    Discharge Diagnoses   New onset thrombocytopenia, suspect multifactorial due to Bactrim drug-induced ITP and UTI   Pancytopenia, likely Bactrim drug-induced   IMAN on CKD stage IIIb  Severe tricuspid regurgitation  Heart failure with preserved ejection fraction - EF 55 to 60%  Benign essential hypertension  Recent supratherapeutic INR  Paroxysmal atrial fibrillation s/p AV node ablation, PPM placement  History of aortic dissection s/p emergent repair 2019  Generalized anxiety disorder    Follow-ups Needed After Discharge   Follow-up Appointments     Follow-up and recommended labs and tests       Hematology will call you to arrange follow up early next week and repeat   blood work, please call the Clinic if you don't receive a call with a   scheduled appointment.  [facility]  [address], [place] [address]   [phone]          Discharge Disposition   Discharged to home  Condition at discharge: Stable     Hospital Course   Priya Funez is a 82 year old female with hx of complex cardiac history including HFpEF, severe TR, chronic a-fib s/p AV node ablation/PPM, aortic dissection s/p repair (2019), HTN, and CKD who was admitted on 9/19/2023 for new onset thrombocytopenia      New onset thrombocytopenia, suspect multifactorial due to Bactrim drug-induced ITP and UTI  No known history of thrombocytopenia  Recently completed course of Bactrim for UTI, did not fully resolve so completed extra week, last dose 9/16. On 9/18 she noticed petechiae to bilateral anterior shins.  She has felt tired but no other associated symptoms. She saw PCP on 9/19 who ordered labs and she was found to have a platelet count of 19,000  Heme/Onc consulted in the ED; recommended IV dexamethasone  - Platelets gradaully improved  this admission: 72K at the time of discharge  - She will receive 1 more dose of IV dexamethasone in house and take prednisone 40 mg x1 after discharge  - She will follow up with Heme/Onc next week     Pancytopenia, likely Bactrim drug-induced  WBC 3.0, Hgb 11.6, Plt 27K. Gradually improved this admission  CBC RESULTS at discharge:  Recent Labs   Lab Test 09/21/23  0744   WBC 6.2   RBC 4.07   HGB 11.8   HCT 36.6   MCV 90   MCH 29.0   MCHC 32.2   RDW 14.1   PLT 72*      IMAN on CKD stage 3b  Hyponatremia, mild  Baseline Cr 1.2-1.4. Cr 1.7 on arrival, likely due to Bactrim. Gradually improved this admission  - Cr 1.24 at the time of discharge. Na 135  - Patient had some loose stools during this admission. C.diff PCR was positive, but antigen and toxin were negative suggesting colonization. She has been advised to hold her spironolactone and torsemide until her diarrhea resolves. She actually has an appointment with Cardiology on the afternoon of discharge     Severe tricuspid regurgitation  Heart failure with preserved ejection fraction - EF 55 to 60%  Benign essential hypertension  Was under evaluation for transcutaneous tricuspid valve replacement trial however reportedly she is not a candidate therefore they are considering replacing the valve via thoracic approach.  Last ECHO (9/8/23): EF 55-60%, severe tricuspid regurgitation  PTA regimen: ASA 81 mg every 24 hours, empaglifozin 10 mg daily, metoprolol tartrate 25 mg BID, spironolactone 25 mg daily, torsemide 40 mg daily  - Pt was advised to hold spironolactone and torsemide as noted above  - Conts on other PTA meds      Recent supratherapeutic INR  Paroxysmal atrial fibrillation s/p AV node ablation, PPM placement  Rate controlled with metoprolol. Anticoagulated with warfarin     History of aortic dissection s/p emergent repair 2019  Stable     Generalized anxiety disorder  Chronic and stable on escitalopram and mirtazapine     Consultations This Hospital Stay    CARE MANAGEMENT / SOCIAL WORK IP CONSULT  HEMATOLOGY & ONCOLOGY IP CONSULT    Code Status   Full Code    Time Spent on this Encounter   I, Katie Harmon MD, personally saw the patient today and spent 35 minutes discharging this patient.       Katie Harmon MD  [place] [address] [facility]  [address]  [place] [address]  Phone: [phone]  ______________________________________________________________________    Physical Exam   Vital Signs: Temp: 98  F (36.7  C) Temp src: Oral BP: 114/70 Pulse: 61   Resp: 18 SpO2: 90 % O2 Device: None (Room air)    Weight: 154 lbs 9.6 oz    Constitutional: Resting comfortably, NAD  HEENT: Sclera white, conjunctiva clear, EOMI, MMM  Respiratory: Breathing non-labored. Lungs CTAB - no wheezes/crackles/rhonchi  Cardiovascular: Heart RRR, +diastolic murmur. No pedal edema.   GI: +BS. Abd soft/NT  Skin: Warm and dry. No rash.  Musculoskeletal: Normal muscle bulk and tone  Neurologic: Alert and appropriate. VERDIN  Psychiatric: Calm and cooperative    Primary Care Physician   Sharmaine Burks    Discharge Orders      Primary Care - Care Coordination Referral      Activity    Your activity upon discharge: activity as tolerated     Follow-up and recommended labs and tests     Hematology will call you to arrange follow up early next week and repeat blood work, please call the Clinic if you don't receive a call with a scheduled appointment.  [facility]  [address], [place] [address]   [phone]     Reason for your hospital stay    Low platelets due to recent antibiotic (Bactrim). I have added this to your allergy list. You were started on steroids and your platelets are now improving     Diet    Follow this diet upon discharge: Regular diet       Significant Results and Procedures   Most Recent 3 CBC's:  Recent Labs   Lab Test 09/21/23  0744 09/20/23  0738 09/19/23  1517   WBC 6.2 3.0* 5.3   HGB 11.8 11.6* 12.5    MCV 90 90 90   PLT 72* 27* 19*     Most Recent 3 BMP's:  Recent Labs   Lab Test 23  0744 23  0738 23  1517   * 132* 134*   POTASSIUM 4.2 4.3 4.2   CHLORIDE 98 96* 94*   CO2 24 25 29   BUN 28.5* 30.1* 31.6*   CR 1.24* 1.39* 1.70*   ANIONGAP 13 11 11   BESS 9.4 8.8 9.0   * 139* 94   ,   Results for orders placed or performed during the hospital encounter of 23   Echocardiogram Complete     Value    LVEF  55-60%    Narrative    [id number]  [id number]  [id number]  [id number]^LAURA^[place] [address]     Name: CEM MORRIS  MRN: [id number]  : 1940  Study Date: 2023 10:00 AM  Age: 82 yrs  Gender: Female  Patient Location: [facility]  Reason For Study: Examination of participant or control in clinical research,  Tric  Ordering Physician: JOSE LUIS SANCHEZ  Referring Physician: JOSE LUIS SANCHEZ  Performed By: DOROTHEA     BSA: 1.7 m2  Height: 63 in  Weight: 151 lb  HR: 63  ______________________________________________________________________________  Procedure  Complete Echo Adult. Good quality two-dimensional was performed and  interpreted.  ______________________________________________________________________________  Interpretation Summary     Left ventricular size, wall motion and function are normal. The ejection  fraction is 55-60%.  The right ventricle is mildly dilated with normal systolic function.  There is malcoaptation of the tricuspid valve, primarily driven by the septal  leaflet resulting in very severe valvular regurgitation.  Compared to the prior study of 23 the right ventricular transvenous  pacemaker has been extracted. Severe tricuspid regurgitation persists.  ______________________________________________________________________________  Left Ventricle  Left ventricular size, wall motion and function are normal. The ejection  fraction is 55-60%. There is normal left ventricular wall thickness.  Left  ventricular diastolic function is not assessable.     Right Ventricle  The right ventricle is mildly dilated. The right ventricular systolic function  is normal. A leadless pacemaker is visualized within the right ventricle.     Atria  Normal left atrial size. The right atrium is severely dilated. 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     Mitral Valve  Mitral valve leaflets appear normal. There is no evidence of mitral stenosis  or clinically significant mitral regurgitation.     Tricuspid Valve  Tricuspid valve fails to coapt. There is severe (4+) tricuspid regurgitation.  The right ventricular systolic pressure is approximated at 23.4 mmHg plus the  right atrial pressure. There is no tricuspid stenosis.     Aortic Valve  Aortic valve leaflets appear normal. There is no evidence of aortic stenosis  or clinically significant aortic regurgitation. The aortic valve is  trileaflet.     Pulmonic Valve  The pulmonic valve is normal in structure and function.     Vessels  IVC diameter >2.1 cm collapsing <50% with sniff suggests a high RA pressure  estimated at 15 mmHg or greater. Hepatic vein flow consistent with severe  tricuspid insufficiency is present.     Pericardium  There is no pericardial effusion.     ______________________________________________________________________________  MMode/2D Measurements & Calculations  IVSd: 0.79 cm  LVIDd: 4.2 cm  LVIDs: 2.8 cm  LVPWd: 0.92 cm  FS: 35.0 %     LV mass(C)d: 112.4 grams  LV mass(C)dI: 65.5 grams/m2  Ao root diam: 3.3 cm  LA dimension: 3.6 cm  LA/Ao: 1.1  LVOT diam: 2.0 cm  LVOT area: 3.1 cm2  Ao root diam Index (cm/m2): 1.9  LA Volume Indexed (AL/bp): 33.0 ml/m2  RWT: 0.44  TAPSE: 1.1 cm     Time Measurements  MM HR: 64.0 BPM     Doppler Measurements & Calculations  MV E max graciela: 80.0 cm/sec  MV dec slope: 383.0 cm/sec2  MV dec time: 0.21 sec  Ao V2 max: 93.9 cm/sec  Ao max P.0 mmHg  Ao V2 mean: 63.9 cm/sec  Ao mean P.0 mmHg  Ao V2 VTI: 18.1 cm  TAMI(I,D):  2.9 cm2  TAMI(V,D): 2.8 cm2  LV V1 max P.8 mmHg  LV V1 max: 83.2 cm/sec  LV V1 VTI: 16.5 cm  SV(LVOT): 51.8 ml  SI(LVOT): 30.2 ml/m2  PI end-d jovi: 94.5 cm/sec  TR max jovi: 242.0 cm/sec  TR max P.4 mmHg  AV Jovi Ratio (DI): 0.89  TAMI Index (cm2/m2): 1.7  E/E' av.8  Lateral E/e': 6.1  Medial E/e': 11.5  RV S Jovi: 11.3 cm/sec     ______________________________________________________________________________  Report approved by: Nestor Vieira 2023 03:52 PM             Discharge Medications   Current Discharge Medication List        START taking these medications    Details   predniSONE (DELTASONE) 20 MG tablet Take 2 tablets (40 mg) by mouth daily for 1 day  Qty: 2 tablet, Refills: 0    Associated Diagnoses: Thrombocytopenia (H)           CONTINUE these medications which have CHANGED    Details   spironolactone (ALDACTONE) 25 MG tablet Take 1 tablet (25 mg) by mouth daily  Qty: 90 tablet, Refills: 3    Associated Diagnoses: Chronic diastolic congestive heart failure (H); Chronic right-sided heart failure (H)      torsemide (DEMADEX) 10 MG tablet Take 4 tablets(40mg) by mouth every day  Qty: 360 tablet, Refills: 3    Associated Diagnoses: Chronic diastolic congestive heart failure (H)           CONTINUE these medications which have NOT CHANGED    Details   Acetaminophen 325 MG CAPS Take 325-650 mg by mouth every 4 hours as needed (Pain/Fever)      albuterol (PROAIR HFA/PROVENTIL HFA/VENTOLIN HFA) 108 (90 Base) MCG/ACT inhaler Inhale 2 puffs into the lungs every 6 hours as needed for shortness of breath / dyspnea or wheezing  Qty: 18 g, Refills: 0    Comments: Pharmacy may dispense brand covered by insurance (Proair, or proventil or ventolin or generic albuterol inhaler)  Associated Diagnoses: Cough      aspirin (ASA) 81 MG EC tablet Take 81 mg by mouth every 24 hours      cholecalciferol 25 MCG (1000 UT) TABS Take 1,000 Units by mouth daily       cyanocobalamin (VITAMIN B-12) 1000 MCG tablet Take  1,000 mcg by mouth daily       empagliflozin (JARDIANCE) 10 MG TABS tablet Take 1 tablet (10 mg) by mouth daily  Qty: 90 tablet, Refills: 3    Associated Diagnoses: Chronic diastolic congestive heart failure (H)      metoprolol tartrate (LOPRESSOR) 25 MG tablet Take 1 tablet (25 mg) by mouth 2 times daily  Qty: 180 tablet, Refills: 3    Associated Diagnoses: Chronic diastolic congestive heart failure (H); Benign essential hypertension; Persistent atrial fibrillation (H)      mirtazapine (REMERON) 15 MG tablet Take 1 tablet (15 mg) by mouth At Bedtime  Qty: 90 tablet, Refills: 3    Associated Diagnoses: Trouble in sleeping      !! warfarin ANTICOAGULANT (COUMADIN) 2 MG tablet Take 2 to 4mg (1 to 2 tabs) by mouth daily OR AS DIRECTED.  Adjust dose based on INR.  Qty: 115 tablet, Refills: 1    Comments: Current dose is 4mg Sun/Thur and 2mg ROW  Associated Diagnoses: Chronic atrial fibrillation (H)      !! warfarin ANTICOAGULANT (COUMADIN) 4 MG tablet Take 4 mg by mouth twice a week Thursday & Sunday    For A-Fib  INR Goal 2-3      escitalopram (LEXAPRO) 10 MG tablet Take 1 tablet (10 mg) by mouth daily  Qty: 90 tablet, Refills: 3    Associated Diagnoses: NEDA (generalized anxiety disorder)       !! - Potential duplicate medications found. Please discuss with provider.        Allergies   Allergies   Allergen Reactions    Bactrim [Sulfamethoxazole-Trimethoprim]      pancytopenia    Amoxicillin Swelling     Face and body    Ciprofloxacin Hives

## 2023-09-22 PROBLEM — Z79.01 CURRENT USE OF LONG TERM ANTICOAGULATION: Status: ACTIVE | Noted: 2023-01-01

## 2023-09-22 NOTE — TELEPHONE ENCOUNTER
ANTICOAGULATION CLINIC REFERRAL RENEWAL REQUEST       An annual renewal order is required for all patients referred to [facility].?  Please review and sign the pended referral order for Priya Funez.       ANTICOAGULATION SUMMARY      Warfarin indication(s)   Atrial Fibrillation    Mechanical heart valve present?  NO       Current goal range   INR: 2.0-3.0     Goal appropriate for indication? Goal INR 2-3, standard for indication(s) above     Time in Therapeutic Range (TTR)  (Goal > 60%) 58.3%       Office visit with referring provider's group within last year yes on 9/19/23       Marcelino Palomares RN  [facility]

## 2023-09-22 NOTE — PROGRESS NOTES
ANTICOAGULATION MANAGEMENT     Priya Funez 82 year old female is on warfarin with subtherapeutic INR result. (Goal INR 2.0-3.0)    Recent labs: (last 7 days)     09/22/23  1540   INR 1.5*       ASSESSMENT     Source(s): Chart Review and Patient/Caregiver Call     Warfarin doses taken: Held for thrombocytopenia while IP  recently which may be affecting INR.   Diet: No new diet changes identified  Medication/supplement changes:  Advised to stop aspirin x 2 weeks . Took last dose of prednisone today  New illness, injury, or hospitalization: Yes: See ADT. Warfarin held IP until platelets were >30k. They were 72k on discharge so she restarted warfarin last night but only took 1 tablet.   Signs or symptoms of bleeding or clotting: No  Previous result: Therapeutic last 2(+) visits  Additional findings: None       PLAN     Recommended plan for temporary change(s) affecting INR     Dosing Instructions: Continue your current warfarin dose with next INR in 5 days       Summary  As of 9/22/2023      Full warfarin instructions:  4 mg every Sun, Thu; 2 mg all other days   Next INR check:  9/27/2023               Telephone call with Priya who verbalizes understanding and agrees to plan    Lab visit scheduled    Education provided:   Please call back if any changes to your diet, medications or how you've been taking warfarin    Plan made per [facility] anticoagulation protocol    Marcelino Palomares RN  Anticoagulation Clinic  9/22/2023    _______________________________________________________________________     Anticoagulation Episode Summary       Current INR goal:  2.0-3.0   TTR:  58.3 % (11.9 mo)   Target end date:  Indefinite   Send INR reminders to:  [employer]    Indications    Chronic atrial fibrillation (H) [I48.20]  Current use of long term anticoagulation (Resolved) [Z79.01]             Comments:               Anticoagulation Care Providers       Provider Role Specialty Phone number    Vitaliy  Sharmaine MAYNARD MD [facility] Internal Medicine [phone]

## 2023-09-22 NOTE — TELEPHONE ENCOUNTER
Pt requesting more information about what to do going forward given she doesn't qualify for clasp trial. Wondering what else she may qualify for. She was looking for [place]. Informed her would route to that team to address.    Please follow up and call Pt at her cell: [phone]    Thanks.    Horace JOHNSTON RN  BSN

## 2023-09-25 NOTE — TELEPHONE ENCOUNTER
Attempted to reach patient. Attached user Shena is no longer working with [employer], and received reroute message for follow up for options for TR repair now that pt is a screen fail for CLASP II TR trial. VM not set up - will call pt at later time.

## 2023-09-25 NOTE — TELEPHONE ENCOUNTER
Health Call Center    Phone Message    May a detailed message be left on voicemail: yes     Reason for Call: Other: Please call pt to discuss her plan of care. She was excluded from the trial for her heart valve and doesn't know what her next steps are. Pt also has questions about jardiance. Pt is currently in hospital with a severe bladder infection.      Action Taken: Message routed to:  Other: Cardiology    Travel Screening: Not Applicable      Thank you!  [facility]

## 2023-09-25 NOTE — Clinical Note
Ms. Funez would like to discuss any other options she may have for her tricuspid valve disease. Thank you, Sofiya

## 2023-09-26 NOTE — TELEPHONE ENCOUNTER
Called pt back to answer her questions regarding Jardiance and next steps regarding her valve, given she is not a candidate for the trial.  Left detailed message stating she should continue her jardiance per her hospital discharge summary.  Also stated she should schedule a follow-up appointment with Dr Mbary to discuss a minimally invasive tricuspid repair/replacement via a R mini thoracotomy approach.  Provided Team 1 RN number to call with further questions or concerns.        Per 9/21/23 [facility] Discharge Summary:    CONTINUE these medications which have NOT CHANGED  empagliflozin (JARDIANCE) 10 MG TABS tablet Take 1 tablet (10 mg) by mouth daily  Qty: 90 tablet, Refills: 3     Associated Diagnoses: Chronic diastolic congestive heart failure (H)       Per 9/11/23 OV with Dr Mabry:         Assessment and Plan:   83 yo F s/p acute Type A aortic dissection repair on 1/4/2019, s/p AV node ablation and PPM implantation subsequently in 2019 with severe TR, recently being worked up for possible enrollment in the CLASP II trial for percutaneous tricuspid valve repair. PPM leads across the tricuspid valve was removed on 7/31/23. She continues to have severe TR. I think given her age and previous sternotomy for Type A dissection repair with prolonged postop recovery course, it is reasonable to offer her percutaneous tricuspid procedure if she qualifies for the trial. Is she is not a candidate, however, (given her tricuspid valve anatomy), I believe she is a good surgical candidate and I would recommend a minimally invasive tricuspid valve repair/replacement via a R mini thoracotomy approach. She is happy to hear this and we will see her back again if she is not a CLASP II Trial candidate.

## 2023-09-26 NOTE — TELEPHONE ENCOUNTER
Pt called back and left a v/m on Team 1 RN inquiring about scheduling with Dr Mabry.  Message sent to CT Surgery RNs Angella Delarosa and Cathi Millan, as well as scheduling, requesting pt be called to set up follow-up appt with Dr Mabry.

## 2023-09-26 NOTE — TELEPHONE ENCOUNTER
Call received from Priya Funez regarding her participation in the CLASP II TR clinical trial.  She had questions regarding her screen failure for study participation, and what her next options might be.   Per her request, will forward her concerns to Dr. Shea for any further treatment options she may have. Patient agreed with this plan.    Sofiya Alexis RN, BSN  Clinical Trials Nurse

## 2023-09-27 NOTE — PROGRESS NOTES
Medical Assistant Note:  Priya Funez presents today for lab draw.    Patient seen by provider today: No.   present during visit today: Not Applicable.    Concerns: No Concerns.    Procedure:  Lab draw site: LAC, Needle type: BF, Gauge: 21. Gauze and coban applied    Post Assessment:  Labs drawn without difficulty: Yes.    Discharge Plan:  Departure Mode: Ambulatory.    Face to Face Time: 5.    Sumaya Galan CMA

## 2023-09-27 NOTE — PROGRESS NOTES
ANTICOAGULATION MANAGEMENT     Priya DAMON Armin 83 year old female is on warfarin with subtherapeutic INR result. (Goal INR 2.0-3.0)    Recent labs: (last 7 days)     09/27/23  0940   INR 1.55*       ASSESSMENT     Source(s): Chart Review     Warfarin doses taken:  Recent held doses due to thrombocytopenia. Restarted maintenance dose 5 days ago  Diet: No new diet changes identified  Medication/supplement changes:  prescribed keflex x 7 days at todays OV  New illness, injury, or hospitalization: Yes: as previously documented  Signs or symptoms of bleeding or clotting: No  Previous result: Subtherapeutic  Additional findings:  advised today by cardiology, can resume aspirin       PLAN     Unable to reach Priya today.    Left message to take a booster dose of warfarin,  3 mg tonight. Request call back for assessment.    Follow up required to assess for changes     Marcelino Palomares RN  Anticoagulation Clinic  9/27/2023

## 2023-09-27 NOTE — PROGRESS NOTES
Oncology/Hematology Visit Note  Sep 27, 2023    Reason for Visit: Follow up of ITP, multiple complaints    Hematologic History:  Priya is an 82 year old female who we were asked to evaluate inpatient 9/2023 for new onset of thrombocytopenia. She had normal CBC 3 weeks prior to admission. She had recently been diagnosed with UTI and completed 2 courses of Bactrim, last 9/5. She presented to Dr. Burks PCP with petechia and CBC showed platelets down to 19K from 250K. She was directed to the ED. She was started on dex 40 mg IV daily x 4 on 9/19. She has had significant improvement in platelets after 2 doses.  Also noted to have mild leukopenia and anemia likely secondary to Bactrim myelosuppression, now improved.     Interval History:  Since discharge, completed dex and petechia resolved. This past few days however, she has noted malaise, suprapubic pain, and low grade fever to 100 nightly. No back/flank pain. She is steady, eating and drinking. Denies bleeding. She points out multiple skin lesions needing assessment.     Review of Systems:  A complete review of systems was negative except as noted in the HPI.     Past medical, Surgical, and social history:  Reviewed    Physical Examination:  /67   Pulse 86   Temp 98.5  F (36.9  C) (Oral)   Resp 18   Wt 69.7 kg (153 lb 9.6 oz)   SpO2 99%   BMI 28.09 kg/m    Wt Readings from Last 10 Encounters:   09/27/23 69.7 kg (153 lb 9.6 oz)   09/21/23 70.1 kg (154 lb 9.6 oz)   09/19/23 69.6 kg (153 lb 8 oz)   09/11/23 69.8 kg (153 lb 12.8 oz)   09/08/23 68.6 kg (151 lb 3.2 oz)   09/05/23 69.8 kg (153 lb 12.8 oz)   08/30/23 69.9 kg (154 lb)   08/30/23 69.9 kg (154 lb)   08/29/23 69.9 kg (154 lb)   08/23/23 68.9 kg (152 lb)     General: Fatigued appearing.   Skin: Warm and dry. Bilateral LE very faint/resolved petechia. She has multiple leg hyperpigmented patches. Left lateral and posterior patches appear scarred. Right below the eye blue/black nodule (can't assess due to  make-up).   Eyes: Anicteric.   ENT: No visible mucosal bleeding.  Lungs: Normal work of breathing.  Abdomen: Suprapubic tenderness, no CVA tenderness.   Extremities: No peripheral edema. Gross movement intact without difficulty. Ambulates with walker with minimal difficulty.   Mental: Calm, cooperative, appropriate. Mood euthymic.  Neuro: Cranial nerves and coordination grossly intact. Alert and oriented x 3.    Laboratory Data:  CBC, UA reviewed.       Assessment and Plan:  Priya Funez is a 83 year old female with:    # ITP  - Patient prescribed Bactrim appropriately for sensitive UTI, initially refractory, then repeated course through 9/16  - At follow-up for UTI she had petechia and CBC showed platelets 19K; directly admitted for further assessment  - She was started on dex 40 mg IV x 4 days 9/19  - Thrombocytopenia has completed resolved now, platelets 273 today  - Petechia improved  - Repeat CBC in 3 months, then 6 months     # Lower Urinary Tract Symptoms  # Low-grade Fever  # Leukocytosis  - She had pan-sensitive E. Coli UTI earlier this month and required re-treatment with Bactrim  - Symptoms resolved since then, but now presents with low grade temperatures nightly, malaise, and suprapubic pain  - Treatment options limited by CKD, Warfarin, and allergies to Bactrim, penicillins, and flouroquinolones   - Obtain UA w/culture today  - Initaite Keflex 500 mg Q12H x 7 days  - Request follow-up with PCP    Addendum: UA negative, I called patient 9/28 AM to instruct to not take the Levaquin. She reports significant malaise and ongoing fevers. Instructed to go to urgent care, consider influenza testing, home COVID negative.     # C. Diff positive PCR  - Positive PCR inpatient 9/20, but negative antigen and toxin therefore likely colonization  - No diarrhea or severe abdominal pain indication active infection    # Cardiac History  - From a hematology standpoint, would be okay to resume Warfarin with platelets  >30K  - Okay to resume Aspirin    # Skin Lesions  - Dermatology consult placed per patient request for non-healing, painful below the right eye nodule (difficult to assess under make-up) and bilateral LE hyperpigmented lesions (benign appearing)    30 minutes spent on the date of the encounter doing chart review, review of test results, interpretation of tests, patient visit, documentation, and discussion with other provider(s)     Ghada Mallory PA-C  [facility]   [phone]

## 2023-09-27 NOTE — LETTER
9/27/2023         RE: Priya Funez  [address]  [place] [address]        Dear Colleague,    Thank you for referring your patient, Priya Funez, to the [facility]. Please see a copy of my visit note below.    Oncology/Hematology Visit Note  Sep 27, 2023    Reason for Visit: Follow up of ITP, multiple complaints    Hematologic History:  Priya is an 82 year old female who we were asked to evaluate inpatient 9/2023 for new onset of thrombocytopenia. She had normal CBC 3 weeks prior to admission. She had recently been diagnosed with UTI and completed 2 courses of Bactrim, last 9/5. She presented to Dr. Burks PCP with petechia and CBC showed platelets down to 19K from 250K. She was directed to the ED. She was started on dex 40 mg IV daily x 4 on 9/19. She has had significant improvement in platelets after 2 doses.  Also noted to have mild leukopenia and anemia likely secondary to Bactrim myelosuppression, now improved.     Interval History:  Since discharge, completed dex and petechia resolved. This past few days however, she has noted malaise, suprapubic pain, and low grade fever to 100 nightly. No back/flank pain. She is steady, eating and drinking. Denies bleeding. She points out multiple skin lesions needing assessment.     Review of Systems:  A complete review of systems was negative except as noted in the HPI.     Past medical, Surgical, and social history:  Reviewed    Physical Examination:  /67   Pulse 86   Temp 98.5  F (36.9  C) (Oral)   Resp 18   Wt 69.7 kg (153 lb 9.6 oz)   SpO2 99%   BMI 28.09 kg/m    Wt Readings from Last 10 Encounters:   09/27/23 69.7 kg (153 lb 9.6 oz)   09/21/23 70.1 kg (154 lb 9.6 oz)   09/19/23 69.6 kg (153 lb 8 oz)   09/11/23 69.8 kg (153 lb 12.8 oz)   09/08/23 68.6 kg (151 lb 3.2 oz)   09/05/23 69.8 kg (153 lb 12.8 oz)   08/30/23 69.9 kg (154 lb)   08/30/23 69.9 kg (154 lb)   08/29/23 69.9 kg (154 lb)   08/23/23 68.9  kg (152 lb)     General: Fatigued appearing.   Skin: Warm and dry. Bilateral LE very faint/resolved petechia. She has multiple leg hyperpigmented patches. Left lateral and posterior patches appear scarred. Right below the eye blue/black nodule (can't assess due to make-up).   Eyes: Anicteric.   ENT: No visible mucosal bleeding.  Lungs: Normal work of breathing.  Abdomen: Suprapubic tenderness, no CVA tenderness.   Extremities: No peripheral edema. Gross movement intact without difficulty. Ambulates with walker with minimal difficulty.   Mental: Calm, cooperative, appropriate. Mood euthymic.  Neuro: Cranial nerves and coordination grossly intact. Alert and oriented x 3.    Laboratory Data:  CBC, UA reviewed.       Assessment and Plan:  Priya Funez is a 83 year old female with:    # ITP  - Patient prescribed Bactrim appropriately for sensitive UTI, initially refractory, then repeated course through 9/16  - At follow-up for UTI she had petechia and CBC showed platelets 19K; directly admitted for further assessment  - She was started on dex 40 mg IV x 4 days 9/19  - Thrombocytopenia has completed resolved now, platelets 273 today  - Petechia improved  - Repeat CBC in 3 months, then 6 months     # Lower Urinary Tract Symptoms  # Low-grade Fever  # Leukocytosis  - She had pan-sensitive E. Coli UTI earlier this month and required re-treatment with Bactrim  - Symptoms resolved since then, but now presents with low grade temperatures nightly, malaise, and suprapubic pain  - Treatment options limited by CKD, Warfarin, and allergies to Bactrim, penicillins, and flouroquinolones   - Obtain UA w/culture today  - Initaite Keflex 500 mg Q12H x 7 days  - Request follow-up with PCP    # C. Diff positive PCR  - Positive PCR inpatient 9/20, but negative antigen and toxin therefore likely colonization  - No diarrhea or severe abdominal pain indication active infection    # Cardiac History  - From a hematology standpoint, would be  okay to resume Warfarin with platelets >30K  - Okay to resume Aspirin    # Skin Lesions  - Dermatology consult placed per patient request for non-healing, painful below the right eye nodule (difficult to assess under make-up) and bilateral LE hyperpigmented lesions (benign appearing)    30 minutes spent on the date of the encounter doing chart review, review of test results, interpretation of tests, patient visit, documentation, and discussion with other provider(s)     Ghada Mallory PA-C  [facility]   [phone]      Again, thank you for allowing me to participate in the care of your patient.        Sincerely,        GHADA MALLORY PA-C

## 2023-09-28 NOTE — TELEPHONE ENCOUNTER
I called pt to discuss upcoming clinic visit with Dr. Mabry and proceeding with TV replacement. Contact information left with request for a return call.

## 2023-09-28 NOTE — PROGRESS NOTES
Per visit with Oncology yesterday:   Now presents with low grade temperatures nightly, malaise, and suprapubic pain.   Initiate Keflex 500 mg Q12H x 7 days UA negative  Addendum: Called patient 9/28 AM to instruct to not take the Levaquin(sic) She reports significant malaise and ongoing fevers. Instructed to go to urgent care, consider influenza testing, home COVID negative.     ANTICOAGULATION MANAGEMENT     Priya Funez 83 year old female is on warfarin with subtherapeutic INR result. (Goal INR 2.0-3.0)    Recent labs: (last 7 days)     09/27/23  0940   INR 1.55*       ASSESSMENT     Source(s): Chart Review and Patient/Caregiver Call     Warfarin doses taken: Warfarin taken as instructed  Diet: No new diet changes identified  Medication/supplement changes:  Patient advised yesterday to take Keflex x7 days, but then this morning advised not to take antibiotics since UA negative (clarified with provider that she meant to write Keflex, not Levaquin). So patient is NOT taking antibiotics at this time.   New illness, injury, or hospitalization: Yes: Recovering from thrombocytopenia after two courses of Bactrim (finished 9/5/23) and a stay in hospital with warfarin held and restarted 9/21/23. Last night she had a fever which caused Priya to feel extreme malaise, but she states she is feeling much better this morning.  Signs or symptoms of bleeding or clotting: No  Previous result: Subtherapeutic after just restarting warfarin  Additional findings: Upcoming surgery/procedure : Will be meeting with provider to discuss possibility of minimally invasive tricuspid repair/replacement via a R mini thoracotomy approach. Patient is aware once she is scheduled to notify [facility] RN so we can work on warfarin hold plan.       PLAN     Recommended plan for temporary change(s) affecting INR     Dosing Instructions: booster dose then continue your current warfarin dose with next INR in 1 week       Summary  As of 9/27/2023      Full  warfarin instructions:  9/27: 3 mg; Otherwise 4 mg every Sun, Thu; 2 mg all other days   Next INR check:  10/3/2023               Telephone call with Priya who verbalizes understanding and agrees to plan    Lab visit scheduled    Education provided:   Please call back if any changes to your diet, medications or how you've been taking warfarin  Interaction IS anticipated between warfarin and most antibiotics, check INR 4 days after starting  Symptom monitoring: monitoring for bleeding signs and symptoms, monitoring for clotting signs and symptoms, and when to seek medical attention/emergency care  Importance of notifying anticoagulation clinic for: changes in medications; a sooner lab recheck maybe needed, diarrhea, nausea/vomiting, reduced intake, cold/flu, and/or infections; a sooner lab recheck maybe needed, and upcoming surgeries and procedures 2 weeks in advance  Contact [phone]  with any changes, questions or concerns.     Plan made per [facility] anticoagulation protocol    Amara Villarreal RN  Anticoagulation Clinic  9/28/2023    _______________________________________________________________________     Anticoagulation Episode Summary       Current INR goal:  2.0-3.0   TTR:  57.0 % (11.9 mo)   Target end date:  Indefinite   [facility]    Indications    Chronic atrial fibrillation (H) [I48.20]  Current use of long term anticoagulation (Resolved) [Z79.01]  Current use of long term anticoagulation [Z79.01]             Comments:               Anticoagulation Care Providers       Provider Role Specialty Phone number    Sharmaine Burks MD Referring Internal Medicine [phone]

## 2023-10-02 NOTE — TELEPHONE ENCOUNTER
Per task, pt needs to schedule surgery with Dr. Mabry at [facility]. Talked with pt and scheduled surgery for 11/29. Will call if anything changes

## 2023-10-03 NOTE — PROGRESS NOTES
ANTICOAGULATION MANAGEMENT     Priya Funez 83 year old female is on warfarin with supratherapeutic INR result. (Goal INR 2.0-3.0)    Recent labs: (last 7 days)     10/03/23  1307   INR 4.5*       ASSESSMENT     Source(s): Chart Review and Patient/Caregiver Call     Warfarin doses taken: Warfarin taken as instructed  Diet:  states she is eating less  Medication/supplement changes:  was started on keflex 500 mg for CDiff but she said that she only had 1 dose and they said the test was neg and she stopped the antibiotic  New illness, injury, or hospitalization: No  Signs or symptoms of bleeding or clotting: No  Previous result: Subtherapeutic  Additional findings: None       PLAN     Recommended plan for ongoing change(s) affecting INR     Dosing Instructions: hold 1 1/2 doses then continue your current warfarin dose with next INR in 6 days       Summary  As of 10/3/2023      Full warfarin instructions:  10/3: Hold; 10/4: 1 mg; Otherwise 4 mg every Sun, Thu; 2 mg all other days   Next INR check:  10/9/2023               Telephone call with Priya who verbalizes understanding and agrees to plan    Lab visit scheduled    Education provided:   Please call back if any changes to your diet, medications or how you've been taking warfarin    Plan made per [facility] anticoagulation protocol    Christine Adames, RN  Anticoagulation Clinic  10/3/2023    _______________________________________________________________________     Anticoagulation Episode Summary       Current INR goal:  2.0-3.0   TTR:  57.4 % (11.9 mo)   Target end date:  Indefinite   Send INR reminders to:  [facility]    Indications    Chronic atrial fibrillation (H) [I48.20]  Current use of long term anticoagulation (Resolved) [Z79.01]  Current use of long term anticoagulation [Z79.01]             Comments:               Anticoagulation Care Providers       Provider Role Specialty Phone number    Sharmaine Burks MD Referring Internal Medicine  [phone]

## 2023-10-09 NOTE — PROGRESS NOTES
ANTICOAGULATION MANAGEMENT     Priya MARISOL Funez 83 year old female is on warfarin with therapeutic INR result. (Goal INR 2.0-3.0)    Recent labs: (last 7 days)     10/09/23  1408   INR 2.7*       ASSESSMENT     Source(s): Chart Review and Patient/Caregiver Call     Warfarin doses taken: Warfarin taken as instructed  Diet: No new diet changes identified  Is going to get some V8 juice and try alittle of that for her greens  Medication/supplement changes: None noted  New illness, injury, or hospitalization: No  Signs or symptoms of bleeding or clotting: No  Previous result: Supratherapeutic  Additional findings: None       PLAN     Recommended plan for no diet, medication or health factor changes affecting INR     Dosing Instructions: Continue your current warfarin dose with next INR in 1 week       Summary  As of 10/9/2023      Full warfarin instructions:  4 mg every Sun, Thu; 2 mg all other days   Next INR check:  10/23/2023               Telephone call with Priya who verbalizes understanding and agrees to plan    Lab visit scheduled    Education provided:   Please call back if any changes to your diet, medications or how you've been taking warfarin    Plan made per [facility] anticoagulation protocol    Christine Adames RN  Anticoagulation Clinic  10/9/2023    _______________________________________________________________________     Anticoagulation Episode Summary       Current INR goal:  2.0-3.0   TTR:  57.7 % (11.9 mo)   Target end date:  Indefinite   Send INR reminders to:  [facility]    Indications    Chronic atrial fibrillation (H) [I48.20]  Current use of long term anticoagulation (Resolved) [Z79.01]  Current use of long term anticoagulation [Z79.01]             Comments:               Anticoagulation Care Providers       Provider Role Specialty Phone number    Sharmaine Burks MD Referring Internal Medicine [phone]

## 2023-10-16 NOTE — PROGRESS NOTES
I met with patient and her family in consultation wt Dr. Real. Pre op testing,dental clearance,PAC clinic discussed. Pre op education with literature and contact information given. All questions addressed. Redo, TVR/R PFO closure, minimally invasive approach planned 11/29.

## 2023-10-17 NOTE — PROGRESS NOTES
CV Surgery Clinic Note    Ms. Funez is a delightful 82 yo F who I saw last month in clinic for evaluation for severe symptomatic tricuspid valve regurgitation (TR). Please refer to my full consultation note dictated on 9/11/23. In brief, she is s/p acute Type A aortic dissection repair with an ascending hemiarch graft in January of 2019 followed by AV leo ablation and PPM. She was recently noted to have severe TR. The PPM lead across the tricuspid valve was removed and was converted to a wireless system. She was evaluated for possible percutaneous tricuspid valve repair/clip therapy, but unfortunately she was not a candidate for the CLASP II Trial due to her tricuspid valve anatomy. She was therefore sent back to my to discuss possible surgical options. She reports continued dyspnea with mild exertion and fatigue.    I have reviewed her TTE, ALEX, CTA chest/abd/pelvis, and her recent LHC/RHC. I do think that she is a reasonable surgical candidate for a tricuspid valve repair vs. replacement with a bioprosthetic valve. I also believe would greatly benefit from a minimally invasive approach via a right minithoracotomy as opposed to a redo thoracotomy approach. I went over the diagnosis in detail, and the reason for recommending a right minithoracotomy tricuspid valve repair/replacement and PFO closure, possible redo sternotomy at [facility]. I went over the risks and benefits of the procedure, including the possible complications associated with the surgery. These complications include, but are not strictly limited to, infection, bleeding, stroke, postop MI, postop arrhythmias, pulmonary and renal complications. I think she is an overall a good redo surgical candidate, and I quoted an operative mortality risk of under 5%. She understands and agrees to proceed. It was a pleasure to see Ms. Funez again today.    Joyce Mabry MD

## 2023-10-17 NOTE — TELEPHONE ENCOUNTER
FUTURE VISIT INFORMATION      SURGERY INFORMATION:  Date: 23  Location: uu or  Surgeon:  Joyce Mabry MD   Anesthesia Type:  general  Procedure: Minimally Invasive Tricuspid Valve Repair possible replacement, Transesophageal Echocardiogram (ALEX) and any associated procedures patent foramen ovale closure   Consult: ov 10/16/23    RECORDS REQUESTED FROM:       Primary Care Provider: Sharmaine Burks MD - [facility]    Pertinent Medical History: chronic diastolic heart failure, chronic atrial fibrillation, hypertension, complete heart block    Most recent EKG+ Tracin23    Most recent ECHO: 23    Most recent Cardiac Stress Test: 17- [facility]    Most recent Coronary Angiogram: 23

## 2023-10-20 NOTE — TELEPHONE ENCOUNTER
Patient called and notified I sent yesika pre op instructions to her in my chart. Requested she review and call with questions.

## 2023-10-25 NOTE — PROGRESS NOTES
ANTICOAGULATION MANAGEMENT     Priya Funez 83 year old female is on warfarin with subtherapeutic INR result. (Goal INR 2.0-3.0)    Recent labs: (last 7 days)     10/25/23  1107   INR 1.9*       ASSESSMENT     Source(s): Chart Review  Previous INR was Therapeutic last visit; previously outside of goal range  Medication, diet, health changes since last INR chart reviewed ~ 10/16/23 Cardiology consult: patient plans to proceed with Redo, TVR/R PFO closure, minimally invasive approach planned 11/29/23 - TE sent to [facility] for procedure planning          PLAN     Unable to reach Priya today.    Left message to continue current dose of warfarin 2 mg tonight. Request call back for assessment.    Follow up required to assess for changes  and discuss out of range result     Lucretia Go RN  Anticoagulation Clinic  10/25/2023

## 2023-10-25 NOTE — PROGRESS NOTES
PLAN      Unable to reach Priya today x 2 attempts     Left message to continue current dose of warfarin 2 mg tonight. Request call back for assessment.     Follow up required to assess for changes and discuss out of range result      Lucretia Go RN  Anticoagulation Clinic  10/25/2023

## 2023-10-25 NOTE — TELEPHONE ENCOUNTER
Patient scheduled for Redo, TVR/R PFO closure, minimally invasive approach on 11/29/23. Patient requires 5 day warfarin hold per Cardiology. Routing to [facility] team for review and recommendations. Lucretia Go, AYSEN, RN

## 2023-10-26 NOTE — PROGRESS NOTES
ANTICOAGULATION MANAGEMENT     Priya MARISOL Funez 83 year old female is on warfarin with subtherapeutic INR result. (Goal INR 2.0-3.0)    Recent labs: (last 7 days)     10/25/23  1107   INR 1.9*       ASSESSMENT     Source(s): Chart Review and Patient/Caregiver Call     Warfarin doses taken: Warfarin taken as instructed  Diet: Increased greens/vitamin K in diet; plans to resume previous intake  Medication/supplement changes: None noted  New illness, injury, or hospitalization: No  Signs or symptoms of bleeding or clotting: No  Previous result: Therapeutic last visit; previously outside of goal range  Additional findings: None       PLAN     Recommended plan for temporary change(s) affecting INR     Dosing Instructions: Continue your current warfarin dose with next INR in 2 weeks       Summary  As of 10/25/2023      Full warfarin instructions:  11/24: Hold; 11/25: Hold; 11/26: Hold; 11/27: Hold; 11/28: Hold; Otherwise 4 mg every Sun, Thu; 2 mg all other days   Next INR check:  11/8/2023               Telephone call with Priya who agrees to plan and repeated back plan correctly    Lab visit scheduled    Education provided:   Please call back if any changes to your diet, medications or how you've been taking warfarin  Goal range and lab monitoring: goal range and significance of current result  Dietary considerations: importance of consistent vitamin K intake and impact of vitamin K foods on INR    Plan made per [facility] anticoagulation protocol    Cathi Cabezas RN  Anticoagulation Clinic  10/26/2023    _______________________________________________________________________     Anticoagulation Episode Summary       Current INR goal:  2.0-3.0   TTR:  60.9% (11.9 mo)   Target end date:  Indefinite   Send INR reminders to:  [facility]    Indications    Chronic atrial fibrillation (H) [I48.20]  Current use of long term anticoagulation (Resolved) [Z79.01]  Current use of long term anticoagulation [Z79.01]              Comments:               Anticoagulation Care Providers       Provider Role Specialty Phone number    Sharmaine Burks MD Referring Internal Medicine [phone]

## 2023-11-01 NOTE — LETTER
11/1/2023      RE: Priya Funez  [address]  [facility] [address]       Dear Colleague,    Thank you for the opportunity to participate in the care of your patient, Priya Funez, at the [facility] [place] at [facility]. Please see a copy of my visit note below.        ELECTROPHYSIOLOGY CLINIC VISIT    Assessment/Recommendations   Assessment/Plan:    Ms. Funez is an 82 year old female who has a medical history significant for HFpEF, severe TR, permanent AF s/p AVN ablation with PPM implant 1/11/19 s/p extraction TV PPM and implant leadless PPM 7/31/23, emergent aortic dissection repair 1/4/19, HTN, and obesity.     Permanent Atrial Fibrillation s/p AVN ablation with PPM  Torrential TR:  1. Stroke Prophylaxis:  CHADSVASC=++age, +gender, +HTN  4, corresponding to a 4.0% annual stroke / systemic emolism event rate. indicating need for long term oral anticoagulation.  She is appropriately on Warfarin. Follows with Coumadin Clinic.   2. Rate Control: intrinsically well rate controlled due to AVN ablation.   3. Rhythm Control: Permanent AF s/p AVN ablation.   4. Risk Factor Management: Statin, BP control, and MANNY evaluation as indicated.       5. PPM: Having torrential TR with imaging evidence of RV pacemaker lead restricting coaptation of septal and anterior leaflets. She underwent extraction/removal of TV PPM lead to see if it would help alleviate some of the TR.  She had device extraction and MICRA implanted on 7/31/23. Device sites well healed, normal MICRA function. Continue routine device clinic follow-up. She continues to have significant TR and will undergo minimally invasive surgical repair upcoming.       Follow up with primary Cardiology, EP, and device team post surgery at [facility].        History of Present Illness/Subjective    Ms. Priya Funez is a 83 year old female who comes in today for EP follow-up of PPM/AF  Dedra.    Ms. Funez is an 82 year old female who has a medical history significant for HFpEF, severe TR, permanent AF s/p AVN ablation with PPM implant 1/11/19 s/p extraction TV PPM and implant leadless PPM 7/31/23, emergent aortic dissection repair 1/4/19, HTN, and obesity.   She underwent an emergent aortic dissection repair in 2019 with a prolonged hospitalization with difficult vent weaning requiring tracheostomy. She also has AF which became permanent and she had AVN ablation and PPM implanted in 2019. She has HFpEF. She has developed severe TR. She had a ALEX to identify mechanism of TR which showed massive TR with RV pacemaker lead restricting coaptation of septal and anterior leaflets. Thus, she was referred for consideration for extraction of pacemaker lead. She reports feeling ALEXANDER/SOB.Patient underwent a successful pacemaker lead extraction, device removal, and implant of leadless pacemaker on 7/31/23.    She is following up today. Groin sites well healed. She saw Dr. Baptiste and he plans to to do TVR upcoming. She reports feeling well today. She denies chest discomfort, palpitations, abdominal fullness/bloating or peripheral edema,  paroxysmal nocturnal dyspnea, orthopnea, lightheadedness, dizziness, pre-syncope, or syncope. Device interrogation shows normal device function, stable lead parameters, and  99.9% . Current cardiac medications include: Spironolactone, Torsemide, Metoprolol, Jardiance, ASA, and Warfarin.         I have reviewed and updated the patient's Past Medical History, Social History, Family History and Medication List.     Cardiographics (Personally Reviewed) :   9/8/23 Echo:   Interpretation Summary     Left ventricular size, wall motion and function are normal. The ejection  fraction is 55-60%.  The right ventricle is mildly dilated with normal systolic function.  There is malcoaptation of the tricuspid valve, primarily driven by the septal  leaflet resulting in very severe valvular  regurgitation.  Compared to the prior study of 6/16/23 the right ventricular transvenous  pacemaker has been extracted. Severe tricuspid regurgitation persists.    8/29/23 Coronary Angiogram:  Conclusion    Trivial nonobstructive CAD.  Proximal LAD is mildly ectatic.  Mild pulmonary hypertension with high-normal left-sided filling pressures and moderately elevated right-sided filling pressures.    Large V waves on RA tracing suggestive of severe TR  Moderate-severely reduced cardiac index by Rajani.      5/2/23 CMR:  1. The LV is normal in cavity size and wall thickness. The global systolic function is normal. The LVEF is  72%. Paradoxical septal motion is noted.      2. The RV is moderately dilated. The global systolic function is normal. The RVEF is 71%.  A pacemaker lead  is noted.     3. There is severe right atrial enlargement.      4. There is severe tricuspid regurgitation (regurgitant volume of 40 ml, corresponding to a regurgitant  fraction of 55%). The jet appears to originate in close proximity to the pacemaker lead attachment to the  septal leaflet.      5. There is a small area of midwall late gadolinium enhancement involving the mid inferolateral wall. This  is a non-specific pattern.     6.  The patient is status post ascending aortic dissection repair. A dissection flap is noted within the  descending thoracic aorta.      CONCLUSIONS:      1) Moderate right ventricular dilatation with normal systolic function.     2) There is severe tricuspid regurgitation (regurgitant volume of 40 ml, corresponding to a regurgitant  fraction of 55%). The jet appears to originate in close proximity to the pacemaker lead attachment to the  septal leaflet.      3)  The patient is status post ascending aortic dissection repair. A dissection flap is noted within the  descending thoracic aorta.        Physical Examination   /85 (BP Location: Right arm, Patient Position: Chair, Cuff Size: Adult Regular)   Pulse 85    Wt 71 kg (156 lb 8 oz)   SpO2 96%   BMI 27.29 kg/m    Wt Readings from Last 3 Encounters:   10/16/23 69.9 kg (154 lb)   09/27/23 69.7 kg (153 lb 9.6 oz)   09/21/23 70.1 kg (154 lb 9.6 oz)     General Appearance:   Alert, well-appearing and in no acute distress.   HEENT: Atraumatic, normocephalic. PERRL.  MMM.   Chest/Lungs:   Respirations unlabored.  Lungs are clear to auscultation.   Cardiovascular:   Irregular, loud murmur.    Abdomen:  Soft, nontender, nondistended.   Extremities: No cyanosis or clubbing. Trace pedal edema.    Musculoskeletal: Moves all extremities.  Gait normal.   Skin: Warm, dry, intact.    Neurologic: Mood and affect are appropriate.  Alert and oriented to person, place, time, and situation.          Medications  Allergies   Current Outpatient Medications   Medication Sig Dispense Refill     Acetaminophen 325 MG CAPS Take 325-650 mg by mouth every 4 hours as needed (Pain/Fever)       albuterol (PROAIR HFA/PROVENTIL HFA/VENTOLIN HFA) 108 (90 Base) MCG/ACT inhaler Inhale 2 puffs into the lungs every 6 hours as needed for shortness of breath / dyspnea or wheezing 18 g 0     aspirin (ASA) 81 MG EC tablet Take 81 mg by mouth every 24 hours       cephALEXin (KEFLEX) 500 MG capsule Take 1 capsule (500 mg) by mouth 2 times daily (Patient not taking: Reported on 10/16/2023) 14 capsule 0     cholecalciferol 25 MCG (1000 UT) TABS Take 1,000 Units by mouth daily        cyanocobalamin (VITAMIN B-12) 1000 MCG tablet Take 1,000 mcg by mouth daily        empagliflozin (JARDIANCE) 10 MG TABS tablet Take 1 tablet (10 mg) by mouth daily 90 tablet 3     escitalopram (LEXAPRO) 10 MG tablet Take 1 tablet (10 mg) by mouth daily 90 tablet 3     metoprolol tartrate (LOPRESSOR) 25 MG tablet Take 1 tablet (25 mg) by mouth 2 times daily 180 tablet 3     mirtazapine (REMERON) 15 MG tablet Take 1 tablet (15 mg) by mouth At Bedtime 90 tablet 3     spironolactone (ALDACTONE) 25 MG tablet Take 1 tablet (25 mg) by mouth  daily 90 tablet 3     torsemide (DEMADEX) 10 MG tablet Take 4 tablets(40mg) by mouth every day 360 tablet 3     warfarin ANTICOAGULANT (COUMADIN) 2 MG tablet Take 2 to 4mg (1 to 2 tabs) by mouth daily OR AS DIRECTED.  Adjust dose based on INR. 115 tablet 1     warfarin ANTICOAGULANT (COUMADIN) 4 MG tablet Take 4 mg by mouth twice a week Thursday & Sunday    For A-Fib  INR Goal 2-3      Allergies   Allergen Reactions     Bactrim [Sulfamethoxazole-Trimethoprim]      pancytopenia     Amoxicillin Swelling     Face and body     Ciprofloxacin Hives         Lab Results (Personally Reviewed)    Chemistry/lipid CBC Cardiac Enzymes/BNP/TSH/INR   Lab Results   Component Value Date    BUN 28.5 (H) 09/21/2023     (L) 09/21/2023    CO2 24 09/21/2023     Creatinine   Date Value Ref Range Status   09/21/2023 1.24 (H) 0.51 - 0.95 mg/dL Final   06/23/2021 1.29 (H) 0.52 - 1.04 mg/dL Final       Lab Results   Component Value Date    CHOL 134 06/14/2021    HDL 53 06/14/2021    LDL 67 06/14/2021      Lab Results   Component Value Date    WBC 11.7 (H) 09/27/2023    HGB 13.1 09/27/2023    HCT 41.6 09/27/2023    MCV 93 09/27/2023     09/27/2023    Lab Results   Component Value Date     (H) 03/12/2019    INR 1.9 (H) 10/25/2023        The patient states understanding and is agreeable with the plan.   TAMRA Rice CNP  Electrophysiology Consult Service  Pager: Text Page                      Please do not hesitate to contact me if you have any questions/concerns.     Sincerely,     TAMRA Rogers CNP

## 2023-11-01 NOTE — NURSING NOTE
Chief Complaint   Patient presents with    Follow Up     3 mo follow-up Micra         Vitals were taken, medications reconciled.    Kris Caceres, Facilitator   11:09 AM

## 2023-11-01 NOTE — PROGRESS NOTES
ELECTROPHYSIOLOGY CLINIC VISIT    Assessment/Recommendations   Assessment/Plan:    Ms. Funez is an 82 year old female who has a medical history significant for HFpEF, severe TR, permanent AF s/p AVN ablation with PPM implant 1/11/19 s/p extraction TV PPM and implant leadless PPM 7/31/23, emergent aortic dissection repair 1/4/19, HTN, and obesity.     Permanent Atrial Fibrillation s/p AVN ablation with PPM  Torrential TR:  1. Stroke Prophylaxis:  CHADSVASC=++age, +gender, +HTN  4, corresponding to a 4.0% annual stroke / systemic emolism event rate. indicating need for long term oral anticoagulation.  She is appropriately on Warfarin. Follows with Coumadin Clinic.   2. Rate Control: intrinsically well rate controlled due to AVN ablation.   3. Rhythm Control: Permanent AF s/p AVN ablation.   4. Risk Factor Management: Statin, BP control, and MANNY evaluation as indicated.       5. PPM: Having torrential TR with imaging evidence of RV pacemaker lead restricting coaptation of septal and anterior leaflets. She underwent extraction/removal of TV PPM lead to see if it would help alleviate some of the TR.  She had device extraction and MICRA implanted on 7/31/23. Device sites well healed, normal MICRA function. Continue routine device clinic follow-up. She continues to have significant TR and will undergo minimally invasive surgical repair upcoming.       Follow up with primary Cardiology, EP, and device team post surgery at [facility].        History of Present Illness/Subjective    Ms. Priya Funez is a 83 year old female who comes in today for EP follow-up of PPM/AF Cares.    Ms. Funez is an 82 year old female who has a medical history significant for HFpEF, severe TR, permanent AF s/p AVN ablation with PPM implant 1/11/19 s/p extraction TV PPM and implant leadless PPM 7/31/23, emergent aortic dissection repair 1/4/19, HTN, and obesity.   She underwent an emergent aortic dissection repair in 2019 with a prolonged  hospitalization with difficult vent weaning requiring tracheostomy. She also has AF which became permanent and she had AVN ablation and PPM implanted in 2019. She has HFpEF. She has developed severe TR. She had a ALEX to identify mechanism of TR which showed massive TR with RV pacemaker lead restricting coaptation of septal and anterior leaflets. Thus, she was referred for consideration for extraction of pacemaker lead. She reports feeling ALEXANDER/SOB.Patient underwent a successful pacemaker lead extraction, device removal, and implant of leadless pacemaker on 7/31/23.    She is following up today. Groin sites well healed. She saw Dr. Baptiste and he plans to to do TVR upcoming. She reports feeling well today. She denies chest discomfort, palpitations, abdominal fullness/bloating or peripheral edema,  paroxysmal nocturnal dyspnea, orthopnea, lightheadedness, dizziness, pre-syncope, or syncope. Device interrogation shows normal device function, stable lead parameters, and  99.9% . Current cardiac medications include: Spironolactone, Torsemide, Metoprolol, Jardiance, ASA, and Warfarin.         I have reviewed and updated the patient's Past Medical History, Social History, Family History and Medication List.     Cardiographics (Personally Reviewed) :   9/8/23 Echo:   Interpretation Summary     Left ventricular size, wall motion and function are normal. The ejection  fraction is 55-60%.  The right ventricle is mildly dilated with normal systolic function.  There is malcoaptation of the tricuspid valve, primarily driven by the septal  leaflet resulting in very severe valvular regurgitation.  Compared to the prior study of 6/16/23 the right ventricular transvenous  pacemaker has been extracted. Severe tricuspid regurgitation persists.    8/29/23 Coronary Angiogram:  Conclusion    1. Trivial nonobstructive CAD.  Proximal LAD is mildly ectatic.  2. Mild pulmonary hypertension with high-normal left-sided filling pressures and  moderately elevated right-sided filling pressures.    3. Large V waves on RA tracing suggestive of severe TR  4. Moderate-severely reduced cardiac index by Rajani.      5/2/23 CMR:  1. The LV is normal in cavity size and wall thickness. The global systolic function is normal. The LVEF is  72%. Paradoxical septal motion is noted.      2. The RV is moderately dilated. The global systolic function is normal. The RVEF is 71%.  A pacemaker lead  is noted.     3. There is severe right atrial enlargement.      4. There is severe tricuspid regurgitation (regurgitant volume of 40 ml, corresponding to a regurgitant  fraction of 55%). The jet appears to originate in close proximity to the pacemaker lead attachment to the  septal leaflet.      5. There is a small area of midwall late gadolinium enhancement involving the mid inferolateral wall. This  is a non-specific pattern.     6.  The patient is status post ascending aortic dissection repair. A dissection flap is noted within the  descending thoracic aorta.      CONCLUSIONS:      1) Moderate right ventricular dilatation with normal systolic function.     2) There is severe tricuspid regurgitation (regurgitant volume of 40 ml, corresponding to a regurgitant  fraction of 55%). The jet appears to originate in close proximity to the pacemaker lead attachment to the  septal leaflet.      3)  The patient is status post ascending aortic dissection repair. A dissection flap is noted within the  descending thoracic aorta.        Physical Examination   /85 (BP Location: Right arm, Patient Position: Chair, Cuff Size: Adult Regular)   Pulse 85   Wt 71 kg (156 lb 8 oz)   SpO2 96%   BMI 27.29 kg/m    Wt Readings from Last 3 Encounters:   10/16/23 69.9 kg (154 lb)   09/27/23 69.7 kg (153 lb 9.6 oz)   09/21/23 70.1 kg (154 lb 9.6 oz)     General Appearance:   Alert, well-appearing and in no acute distress.   HEENT: Atraumatic, normocephalic. PERRL.  MMM.   Chest/Lungs:    Respirations unlabored.  Lungs are clear to auscultation.   Cardiovascular:   Irregular, loud murmur.    Abdomen:  Soft, nontender, nondistended.   Extremities: No cyanosis or clubbing. Trace pedal edema.    Musculoskeletal: Moves all extremities.  Gait normal.   Skin: Warm, dry, intact.    Neurologic: Mood and affect are appropriate.  Alert and oriented to person, place, time, and situation.          Medications  Allergies   Current Outpatient Medications   Medication Sig Dispense Refill     Acetaminophen 325 MG CAPS Take 325-650 mg by mouth every 4 hours as needed (Pain/Fever)       albuterol (PROAIR HFA/PROVENTIL HFA/VENTOLIN HFA) 108 (90 Base) MCG/ACT inhaler Inhale 2 puffs into the lungs every 6 hours as needed for shortness of breath / dyspnea or wheezing 18 g 0     aspirin (ASA) 81 MG EC tablet Take 81 mg by mouth every 24 hours       cephALEXin (KEFLEX) 500 MG capsule Take 1 capsule (500 mg) by mouth 2 times daily (Patient not taking: Reported on 10/16/2023) 14 capsule 0     cholecalciferol 25 MCG (1000 UT) TABS Take 1,000 Units by mouth daily        cyanocobalamin (VITAMIN B-12) 1000 MCG tablet Take 1,000 mcg by mouth daily        empagliflozin (JARDIANCE) 10 MG TABS tablet Take 1 tablet (10 mg) by mouth daily 90 tablet 3     escitalopram (LEXAPRO) 10 MG tablet Take 1 tablet (10 mg) by mouth daily 90 tablet 3     metoprolol tartrate (LOPRESSOR) 25 MG tablet Take 1 tablet (25 mg) by mouth 2 times daily 180 tablet 3     mirtazapine (REMERON) 15 MG tablet Take 1 tablet (15 mg) by mouth At Bedtime 90 tablet 3     spironolactone (ALDACTONE) 25 MG tablet Take 1 tablet (25 mg) by mouth daily 90 tablet 3     torsemide (DEMADEX) 10 MG tablet Take 4 tablets(40mg) by mouth every day 360 tablet 3     warfarin ANTICOAGULANT (COUMADIN) 2 MG tablet Take 2 to 4mg (1 to 2 tabs) by mouth daily OR AS DIRECTED.  Adjust dose based on INR. 115 tablet 1     warfarin ANTICOAGULANT (COUMADIN) 4 MG tablet Take 4 mg by mouth twice a  week Thursday & Sunday    For A-Fib  INR Goal 2-3      Allergies   Allergen Reactions     Bactrim [Sulfamethoxazole-Trimethoprim]      pancytopenia     Amoxicillin Swelling     Face and body     Ciprofloxacin Hives         Lab Results (Personally Reviewed)    Chemistry/lipid CBC Cardiac Enzymes/BNP/TSH/INR   Lab Results   Component Value Date    BUN 28.5 (H) 09/21/2023     (L) 09/21/2023    CO2 24 09/21/2023     Creatinine   Date Value Ref Range Status   09/21/2023 1.24 (H) 0.51 - 0.95 mg/dL Final   06/23/2021 1.29 (H) 0.52 - 1.04 mg/dL Final       Lab Results   Component Value Date    CHOL 134 06/14/2021    HDL 53 06/14/2021    LDL 67 06/14/2021      Lab Results   Component Value Date    WBC 11.7 (H) 09/27/2023    HGB 13.1 09/27/2023    HCT 41.6 09/27/2023    MCV 93 09/27/2023     09/27/2023    Lab Results   Component Value Date     (H) 03/12/2019    INR 1.9 (H) 10/25/2023        The patient states understanding and is agreeable with the plan.   TAMRA Rice CNP  Electrophysiology Consult Service  Pager: Text Page

## 2023-11-01 NOTE — PATIENT INSTRUCTIONS
It was a pleasure to see you in clinic today, please do not hesitate to call us for questions or concerns.  Your next automatic remote pacemaker check from home is scheduled per [facility] device clinics schedule.    Parisa Brown RN    Electrophysiology Nurse Clinician  [facility]    During Business Hours Please Call:  [phone]  After Hours Please Call:  [phone] - select option #4 and ask for job code [id number]

## 2023-11-06 NOTE — PROGRESS NOTES
Preoperative Assessment Center Medication History Note  Medication history completed on November 7, 2023 by this writer prior to patient's PAC appointment. See Epic admission navigator for prior to admission medications. Operating room staff will still need to confirm medications and last dose information on day of surgery.     Medication history interview sources  Patient and CareEverywhere/SureScripts via phone    Changes made to PTA medication list  Added: none  Deleted: cephalexin (completed)  Changed: updated warfarin dose/sig,     Allergies reviewed with patient and updates made in EHR: yes    -- No recent (within 30 days) course of antibiotics  -- No recent (within 30 days) course of systemic steroids  -- Reports being on blood thinning medications - warfarin and aspirin - see other note.    -- Declines being on any other prescription or over-the-counter medications    Prior to Admission medications    Medication Sig Last Dose Taking? Auth Provider Long Term End Date   Acetaminophen 325 MG CAPS Take 325-650 mg by mouth every 4 hours as needed (Pain/Fever) Taking Yes Reported, Patient     albuterol (PROAIR HFA/PROVENTIL HFA/VENTOLIN HFA) 108 (90 Base) MCG/ACT inhaler Inhale 2 puffs into the lungs every 6 hours as needed for shortness of breath / dyspnea or wheezing Taking Yes Sharmaine Burks MD Yes    aspirin (ASA) 81 MG EC tablet Take 81 mg by mouth daily Taking Yes Reported, Patient     cholecalciferol 25 MCG (1000 UT) TABS Take 1,000 Units by mouth daily  Taking Yes Reported, Patient     cyanocobalamin (VITAMIN B-12) 1000 MCG tablet Take 1,000 mcg by mouth daily  Taking Yes Reported, Patient     empagliflozin (JARDIANCE) 10 MG TABS tablet Take 1 tablet (10 mg) by mouth daily Taking Yes Becky Kwok MD     escitalopram (LEXAPRO) 10 MG tablet Take 1 tablet (10 mg) by mouth daily Taking Yes Sharmaine Burks MD Yes    metoprolol tartrate (LOPRESSOR) 25 MG tablet Take 1 tablet (25 mg) by mouth 2 times  RN cannot approve Refill Request    RN can NOT refill this medication PCP messaged that patient is overdue for Office Visit and Protocol failed and RN gave 1 month refill Patient has upcoming appointment 03/04/2021 with PCP. Last office visit: 12/19/2019 Baylee Luevano FNP Last Physical: 7/19/2017 Last MTM visit: Visit date not found Last visit same specialty: 12/19/2019 Baylee Luevano FNP.  Next visit within 3 mo: Visit date not found  Next physical within 3 mo: Visit date not found      Frances Galvez, [facility] Triage/Med Refill 2/21/2021    Requested Prescriptions   Pending Prescriptions Disp Refills     DULoxetine (CYMBALTA) 30 MG capsule [Pharmacy Med Name: DULoxetine HCl 30 MG Oral Capsule Delayed Release Particles] 90 capsule 0     Sig: TAKE 1 CAPSULE BY MOUTH THREE TIMES DAILY       Tricyclics/Misc Antidepressant/Antianxiety Meds Refill Protocol Failed - 2/21/2021  4:50 PM        Failed - PCP or prescribing provider visit in last year     Last office visit with prescriber/PCP: 12/19/2019 Baylee Luevano FNP OR same dept: Visit date not found OR same specialty: 12/19/2019 Baylee Luevano FNP  Last physical: 7/19/2017 Last MTM visit: Visit date not found   Next visit within 3 mo: Visit date not found  Next physical within 3 mo: Visit date not found  Prescriber OR PCP: ROSALINA Delgado  Last diagnosis associated with med order: 1. Adjustment disorder with mixed anxiety and depressed mood  - DULoxetine (CYMBALTA) 30 MG capsule [Pharmacy Med Name: DULoxetine HCl 30 MG Oral Capsule Delayed Release Particles]; TAKE 1 CAPSULE BY MOUTH THREE TIMES DAILY  Dispense: 90 capsule; Refill: 0    If protocol passes may refill for 12 months if within 3 months of last provider visit (or a total of 15 months).                    daily Taking Yes Becky Kwok MD Yes    mirtazapine (REMERON) 15 MG tablet Take 1 tablet (15 mg) by mouth At Bedtime Taking Yes Sharmaine Burks MD Yes    spironolactone (ALDACTONE) 25 MG tablet Take 1 tablet (25 mg) by mouth daily Taking Yes Katie Harmon MD Yes    torsemide (DEMADEX) 10 MG tablet Take 4 tablets(40mg) by mouth every day Taking Yes Katie Harmon MD Yes    warfarin ANTICOAGULANT (COUMADIN) 2 MG tablet Take 2 to 4mg (1 to 2 tabs) by mouth daily OR AS DIRECTED.  Adjust dose based on INR. Taking Yes Sharmaine Burks MD Yes         Medication History Completed By: Jose Enrique Borjas RPH 11/6/2023 1:58 PM

## 2023-11-07 NOTE — H&P
Pre-Operative H & P     CC:  Preoperative exam to assess for increased cardiopulmonary risk while undergoing surgery and anesthesia.    Date of Encounter: 11/7/2023  Primary Care Physician:  Sharmaine Burks     Reason for visit:   Encounter Diagnosis   Name Primary?    Pre-op evaluation Yes       HPI  Priya Funez is a 83 year old female who presents for pre-operative H & P in preparation for  Procedure Information       Case: [id number] Date/Time: 11/29/23 1000    Procedures:       Minimally Invasive Tricuspid Valve Repair possible replacement, Transesophageal Echocardiogram (ALEX) and any associated procedures (Chest)      patent foramen ovale closure (Heart)    Anesthesia type: General    Diagnosis:       Tricuspid valve regurgitation [I07.1]      Patent foramen ovale [Q21.12]      Atrial fibrillation (H) [I48.91]    Pre-op diagnosis:       Tricuspid valve regurgitation [I07.1]      Patent foramen ovale [Q21.12]      Atrial fibrillation (H) [I48.91]    Location:  OR [address] [place] [address] OR    Providers: Joyce Mabry MD            Patient is being evaluated for comorbid conditions of hypertension, atrial fibrillation, CHF, CRI, anxiety    Ms. Funez has severe symptomatic tricuspid valve regurgitation. She has been seen by Dr. Mabry for further evaluation. She is now scheduled for the above procedure.     History is obtained from the patient and chart review    Hx of abnormal bleeding or anti-platelet use: ASA, warfarin     Menstrual history: No LMP recorded. Patient is postmenopausal.     Past Medical History  Past Medical History:   Diagnosis Date    Benign essential hypertension     Cardiac pacemaker in situ     Medtronic    Chronic atrial fibrillation (H)     s/p AV node ablation and PPM placement January, 2019    Chronic diastolic heart failure (H)     Chronic kidney disease, stage III (moderate) (H)     Chronic right-sided heart failure (H)     NEDA (generalized anxiety disorder)     History of aortic  dissection     s/p emergent repair 2019    History of blood transfusion     no adverse reactions    Severe tricuspid regurgitation        Past Surgical History  Past Surgical History:   Procedure Laterality Date    ANGIOGRAM Right 01/04/2019    Procedure: DIAGONSTIC ANGIOGRAM OF SMA;  Surgeon: Rigo Jennings MD;  Location:  OR    BIOPSY BREAST      BYPASS GRAFT ARTERY CORONARY, REPAIR VALVE AORTIC, COMBINED N/A 01/04/2019    Procedure: AORTIC DISSECTION REPAIR WITH GRAFT- HEMASHIELD PLATINUM WOVEN DOUBLE VELOR 4 SIDE ARM VASCULAR GRAFT, D:35 X 12 X 10 X 10MM/ L:50CM;  Surgeon: Jose Winslow MD;  Location:  OR    CV CORONARY ANGIOGRAM N/A 8/29/2023    Procedure: Coronary Angiogram;  Surgeon: Ar Morales MD;  Location:  HEART CARDIAC CATH LAB    CV PCI N/A 8/29/2023    Procedure: Percutaneous Coronary Intervention;  Surgeon: Ar Morales MD;  Location:  HEART CARDIAC CATH LAB    CV RIGHT HEART CATH MEASUREMENTS RECORDED N/A 8/29/2023    Procedure: Right Heart Catheterization;  Surgeon: Ar Morales MD;  Location:  HEART CARDIAC CATH LAB    EP ABLATION AV NODE N/A 01/11/2019    Procedure: EP Ablation AV Node;  Surgeon: Tsering Davey MD;  Location:  HEART CARDIAC CATH LAB    EP LEAD EXTRACTION  7/31/2023    EP PACEMAKER Left 01/11/2019    Procedure: EP Pacemaker;  Surgeon: Tsering Davey MD;  Location:  HEART CARDIAC CATH LAB    EP PACEMAKER LEADLESS DEVICE IMPLANT  7/31/2023    EP PACEMAKER LEADLESS DEVICE IMPLANT N/A 7/31/2023    Procedure: Pacemaker Leadless Device Implant;  Surgeon: Gaston Car MD;  Location: [facility] OR    THYROID SURGERY      benign mass removed    TRACHEOSTOMY N/A 01/25/2019    Procedure: TRACHEOSTOMY  (DR MCCLELLAND);  Surgeon: Bryson Mcclelland MD;  Location:  OR    TRANSESOPHAGEAL ECHOCARDIOGRAM INTRAOPERATIVE N/A 6/16/2023    Procedure: ECHOCARDIOGRAM,TRANSESOPHAGEAL,WITH ANESTHESIA;  Surgeon: Dennis Meier DO;   Location: [facility] OR    TRANSESOPHAGEAL ECHOCARDIOGRAM INTRAOPERATIVE N/A 9/8/2023    Procedure: ECHOCARDIOGRAM,TRANSESOPHAGEAL,WITH ANESTHESIA;  Surgeon: Rios Heredia MD;  Location: [facility] OR    TUBAL LIGATION         Prior to Admission Medications  Current Outpatient Medications   Medication Sig Dispense Refill    Acetaminophen 325 MG CAPS Take 325-650 mg by mouth every 4 hours as needed (Pain/Fever)      albuterol (PROAIR HFA/PROVENTIL HFA/VENTOLIN HFA) 108 (90 Base) MCG/ACT inhaler Inhale 2 puffs into the lungs every 6 hours as needed for shortness of breath / dyspnea or wheezing 18 g 0    aspirin (ASA) 81 MG EC tablet Take 81 mg by mouth daily      cholecalciferol 25 MCG (1000 UT) TABS Take 1,000 Units by mouth daily       cyanocobalamin (VITAMIN B-12) 1000 MCG tablet Take 1,000 mcg by mouth daily       empagliflozin (JARDIANCE) 10 MG TABS tablet Take 1 tablet (10 mg) by mouth daily 90 tablet 3    escitalopram (LEXAPRO) 10 MG tablet Take 1 tablet (10 mg) by mouth daily 90 tablet 3    metoprolol tartrate (LOPRESSOR) 25 MG tablet Take 1 tablet (25 mg) by mouth 2 times daily 180 tablet 3    mirtazapine (REMERON) 15 MG tablet Take 1 tablet (15 mg) by mouth At Bedtime 90 tablet 3    spironolactone (ALDACTONE) 25 MG tablet Take 1 tablet (25 mg) by mouth daily 90 tablet 3    torsemide (DEMADEX) 10 MG tablet Take 4 tablets(40mg) by mouth every day 360 tablet 3    warfarin ANTICOAGULANT (COUMADIN) 2 MG tablet Take 2 to 4mg (1 to 2 tabs) by mouth daily OR AS DIRECTED.  Adjust dose based on INR. 115 tablet 1       Allergies  Allergies   Allergen Reactions    Bactrim [Sulfamethoxazole-Trimethoprim]      pancytopenia    Amoxicillin Swelling     Face and body    Ciprofloxacin Hives       Social History  Social History     Socioeconomic History    Marital status:      Spouse name: Not on file    Number of children: Not on file    Years of education: Not on file    Highest education level: Not on file    Occupational History    Occupation: Retired - customer service   Tobacco Use    Smoking status: Never    Smokeless tobacco: Never   Vaping Use    Vaping Use: Never used   Substance and Sexual Activity    Alcohol use: Never    Drug use: Never    Sexual activity: Not on file   Other Topics Concern    Parent/sibling w/ CABG, MI or angioplasty before 65F 55M? Not Asked   Social History Narrative    .    Lives in home with . Fully independent.    4 adult children.    8 grandchildren.    Walks 2-3x/week.     Social Determinants of Health     Financial Resource Strain: Low Risk  (10/10/2023)    Financial Resource Strain     Within the past 12 months, have you or your family members you live with been unable to get utilities (heat, electricity) when it was really needed?: No   Food Insecurity: Low Risk  (10/10/2023)    Food Insecurity     Within the past 12 months, did you worry that your food would run out before you got money to buy more?: No     Within the past 12 months, did the food you bought just not last and you didn t have money to get more?: No   Transportation Needs: Low Risk  (10/10/2023)    Transportation Needs     Within the past 12 months, has lack of transportation kept you from medical appointments, getting your medicines, non-medical meetings or appointments, work, or from getting things that you need?: No   Physical Activity: Not on file   Stress: Not on file   Social Connections: Not on file   Interpersonal Safety: Not on file   Housing Stability: Low Risk  (10/10/2023)    Housing Stability     Do you have housing? : Yes     Are you worried about losing your housing?: No       Family History  Family History   Problem Relation Age of Onset    Ataxia Mother     Heart Disease Father     Diabetes No family hx of     Myocardial Infarction No family hx of     Cerebrovascular Disease No family hx of     Coronary Artery Disease Early Onset No family hx of     Breast Cancer No family hx of      Colon Cancer No family hx of     Ovarian Cancer No family hx of     Anesthesia Reaction No family hx of     Deep Vein Thrombosis (DVT) No family hx of        Review of Systems  The complete review of systems is negative other than noted in the HPI or here.   Anesthesia Evaluation   Pt has had prior anesthetic.     No history of anesthetic complications       ROS/MED HX  ENT/Pulmonary: Comment: H/o trach in 2019   (-) tobacco use   Neurologic:  - neg neurologic ROS  (-) no seizures and no CVA   Cardiovascular: Comment: S/p surgery for aortic dissection in 2019    (+)  hypertension- -   -  - -   Taking blood thinners   CHF  Last EF: 55-60 date: 9/2023     pacemaker,  type: Medtronic, settings: VVIR ,       dysrhythmias, a-fib,  valvular problems/murmurs  severe TR.    Previous cardiac testing   Echo: Date: 9/2023 Results:  Interpretation Summary     Left ventricular size, wall motion and function are normal. The ejection  fraction is 55-60%.  The right ventricle is mildly dilated with normal systolic function.  There is malcoaptation of the tricuspid valve, primarily driven by the septal  leaflet resulting in very severe valvular regurgitation.  Compared to the prior study of 6/16/23 the right ventricular transvenous  pacemaker has been extracted. Severe tricuspid regurgitation persists.    Stress Test:  Date: Results:    ECG Reviewed:  Date: 8/23/23 Results:  Atrial fibrillation, V paced   Cath:  Date: 8/29/23 Results:  1. Trivial nonobstructive CAD.  Proximal LAD is mildly ectatic.  2. Mild pulmonary hypertension with high-normal left-sided filling pressures and moderately elevated right-sided filling pressures.    3. Large V waves on RA tracing suggestive of severe TR  4. Moderate-severely reduced cardiac index by Rajani.      METS/Exercise Tolerance:  Comment: Walking with walker- walking around stores. Can go up and down stairs. ALEXANDER   Hematologic:     (+)       history of blood transfusion, no previous  "transfusion reaction,     (-) history of blood clots   Musculoskeletal:  - neg musculoskeletal ROS     GI/Hepatic:  - neg GI/hepatic ROS  (-) GERD   Renal/Genitourinary:     (+) renal disease, type: CRI, Pt does not require dialysis,           Endo:    (-) chronic steroid usage   Psychiatric/Substance Use:     (+) psychiatric history anxiety       Infectious Disease:  - neg infectious disease ROS     Malignancy:  - neg malignancy ROS     Other:            /81 (BP Location: Right arm, Patient Position: Sitting, Cuff Size: Adult Regular)   Pulse 91   Temp 98.6  F (37  C) (Oral)   Resp 16   Ht 1.613 m (5' 3.5\")   Wt 69.4 kg (153 lb)   SpO2 97%   Breastfeeding No   BMI 26.68 kg/m      Physical Exam   Constitutional: Awake, alert, cooperative, no apparent distress, and appears stated age.  Eyes: Pupils equal, round and reactive to light, extra ocular muscles intact, sclera clear, conjunctiva normal.  HENT: Normocephalic, oral pharynx with moist mucus membranes, good dentition.  Respiratory: Clear to auscultation bilaterally, no crackles or wheezing.  Cardiovascular: Regular rate and rhythm, systolic murmur noted.  Carotids no bruits. No edema. Palpable pulses to radial arteries.   GI: Normal bowel sounds, soft, non-distended, non-tender  Genitourinary:  deferred  Skin: Warm and dry.  Musculoskeletal: Full ROM of neck. There is no redness, warmth, or swelling of the exposed joints. Gross motor strength is normal .    Neurologic: Awake, alert, oriented to name, place and time. Cranial nerves II-XII are grossly intact.   Neuropsychiatric: Calm, cooperative. Normal affect.     Prior Labs/Diagnostic Studies   All labs and imaging personally reviewed     EKG/ stress test - if available please see in ROS above   Echo result w/o MOPS: Interpretation Summary 1. Left ventricular size, wall motion and function are normal. The ejectionfraction is 55-60%.2. Right ventricle is mildly dilated with normal systolic " function. A leadlesspacemaker is visualized within the right ventricle.3. Right atrium is severely dilated.4. A patent foramen ovale is identified.5. There is malcoaptation of the tricuspid valve, primarily due to the septalleaflet which appears small in size. This results in very severe valvularregurgitation. The other two leaflets appear normal in thickness and mobility.The coaptation gap on the septal aspect of the tricuspid valve is 16 mm.6. There is an intimal flap and dissection plane in the aortic arch extendingdown the descending aorta. The dissection plane appears filled with thrombusand is without flow by color doppler.     Echo result w/o MOPS: Interpretation Summary Left ventricular size, wall motion and function are normal. The ejectionfraction is 55-60%.The right ventricle is mildly dilated with normal systolic function.There is malcoaptation of the tricuspid valve, primarily driven by the septalleaflet resulting in very severe valvular regurgitation.Compared to the prior study of 6/16/23 the right ventricular transvenouspacemaker has been extracted. Severe tricuspid regurgitation persists.           No data to display                  The patient's records and results personally reviewed by this provider.     Outside records reviewed from: Care Everywhere    LAB/DIAGNOSTIC STUDIES TODAY:  CBC, CMP, T&S    Assessment    Priya Funez is a 83 year old female seen as a PAC referral for risk assessment and optimization for anesthesia.    Plan/Recommendations  Pt will be optimized for the proposed procedure.  See below for details on the assessment, risk, and preoperative recommendations    NEUROLOGY  - No history of TIA, CVA or seizure  -Post Op delirium risk factors:  Age    ENT  -h/o difficult intubation. Last airway below:    Placement Date: 07/31/23; Placement Time: 0820 (created via procedure documentation); Mask Ventilation: 2; Induction Type: Intravenous; Ease of Intubation: Easy; Technique:  "Direct laryngoscopy; ETT Type: Single; Tube Size: 7 mm (passes easily post trach); DL Blade Size: Lynch 2; Grade View: 1; Adjucts: Stylet; Placement Person: CRNA; Attempts: 1; Depth: 21 cm     Mallampati: III  TM: > 3    CARDIAC  -hypertension using lopressor  -atrial fibrillation. Using warfarin. Plan to hold x5 days prior to surgery  -medtronic pacemaker present. Last interrogation 11/1/23  -h/o aortic dissection s/p emergent surgical intervention 2019  -severe TR with the above procedure planned  CHF, last EF 55-60%. Will hold jardiance x3 days per protocol   -previous cardiac testing above    PULMONARY  MANNY Low Risk            Total Score: 2    MANNY: Hypertension    MANNY: Over 50 ys old      - Denies asthma or inhaler use  - Tobacco History    History   Smoking Status    Never   Smokeless Tobacco    Never       GI  PONV High Risk  Total Score: 3           1 AN PONV: Pt is Female    1 AN PONV: Patient is not a current smoker    1 AN PONV: Intended Post Op Opioids        /RENAL  - Baseline Creatinine 1.2, stable     ENDOCRINE    - BMI: Estimated body mass index is 26.68 kg/m  as calculated from the following:    Height as of this encounter: 1.613 m (5' 3.5\").    Weight as of this encounter: 69.4 kg (153 lb).  Overweight (BMI 25.0-29.9)  - No history of Diabetes Mellitus    HEME  VTE Low Risk 0.26%            Total Score: 1    VTE: Greater than 59 yrs old      - Coagulopathy second to Warfarin (Coumadin, Jantoven)  - Platelet disfunction second to Aspirin (Felicia, many others)  -will update CBC and T&S today     PSYCH  - anxiety, stable     Different anesthesia methods/types have been discussed with the patient, but they are aware that the final plan will be decided by the assigned anesthesia provider on the date of service.    The patient is optimized for their procedure. AVS with information on surgery time/arrival time, meds and NPO status given by nursing staff. No further diagnostic testing indicated.      On " the day of service:     Prep time: 11 minutes  Visit time: 15 minutes  Documentation time: 12 minutes  ------------------------------------------  Total time: 38 minutes      Melania Poole PA-C  Preoperative Assessment Center  [facility]  Clinic and [facility] Center  Phone: [phone]  Fax: [phone]

## 2023-11-07 NOTE — PROGRESS NOTES
Anticoagulation Note - Preoperative Assessment Center (PAC) Pharmacist     Patient was interviewed on November 7, 2023 prior to scheduled PAC clinic appointment. The purpose of this note is to document the perioperative anticoagulation plan outlined by the providers caring for Priya Funez.     Current Regimen  Anticoagulation Regimen as of November 7, 2023: warfarin - take 4 mg every Sun, Thu; 2 mg all other days of the week. Takes in the evening.   Indication: chronic atrial fibrillation  Prescriber:  Dr. Burks (managed by [facility])  Expected Duration of therapy: indefinite  INR Goal: 2-3    Creatinine   Date Value Ref Range Status   09/21/2023 1.24 (H) 0.51 - 0.95 mg/dL Final   06/23/2021 1.29 (H) 0.52 - 1.04 mg/dL Final       Perioperative plan  Priya Funez is scheduled for Minimally Invasive Tricuspid Valve Repair possible replacement, Transesophageal Echocardiogram (ALEX) and any associated procedures; patent foramen ovale closure on 11/29/23 with Dr. aMbry and the perioperative anticoagulation plan outlined by CVTS team is to hold warfarin 5 days pre op (last dose on 11/23/23).  Please see letter from 10/20/23 and [facility] anticoagulation clinic notes for full details.     Also, last dose of aspirin on 11/28.     Resumption of anticoagulation after procedure will be based on surgery team assessment of bleeding risks and complications.  This plan may require re-assessment and modification by her primary team in the perioperative setting depending on patients clinical situation.        Jose Enrique Borjas RPH  November 6, 2023  1:58 PM

## 2023-11-07 NOTE — TELEPHONE ENCOUNTER
"BRITTANY-PROCEDURAL ANTICOAGULATION  MANAGEMENT    ASSESSMENT     Warfarin interruption plan for Minimally Invasive Tricuspid Valve Repair possible replacement and PFO closure on 11/29/23.    Indication for Anticoagulation: Atrial Fibrillation    [id number] = 4 (Hypertension, Age >= 75, and Female)    Brittany-Procedure Risk stratification for thromboembolism: low (2022 Chest guidelines)    AFIB: 2022 CHEST Perioperative Management guidelines recommends against bridging for patients with atrial fibrillation except in high risk stratification patients.    RECOMMENDATION     Pre-Procedure:  Hold warfarin for 5 days, until after procedure starting: Friday, November 24   No Bridge    Post-Procedure:  Resume warfarin dose if okay with provider doing procedure on night of procedure, Wednesday, November 29 PM  Recheck INR ~ 3-7 days after discharge     [facility] has already advised patient of the plan via [employer] message on 10/20/23    Maggy Hsu RPH    SUBJECTIVE/OBJECTIVE     Priyabereket Funez, a 83 year old female    Goal INR Range: 2.0-3.0     Patient bridged in past: No      Wt Readings from Last 3 Encounters:   11/01/23 71 kg (156 lb 8 oz)   10/16/23 69.9 kg (154 lb)   09/27/23 69.7 kg (153 lb 9.6 oz)      Ideal body weight: 53.5 kg (118 lb 0.9 oz)  Adjusted ideal body weight: 60.5 kg (133 lb 6.9 oz)     Estimated body mass index is 27.29 kg/m  as calculated from the following:    Height as of 10/16/23: 1.613 m (5' 3.5\").    Weight as of 11/1/23: 71 kg (156 lb 8 oz).    Lab Results   Component Value Date    INR 1.9 (H) 10/25/2023    INR 2.7 (H) 10/09/2023    INR 4.5 (H) 10/03/2023     Lab Results   Component Value Date    HGB 13.1 09/27/2023    HCT 41.6 09/27/2023     09/27/2023     Lab Results   Component Value Date    CR 1.24 (H) 09/21/2023    CR 1.39 (H) 09/20/2023    CR 1.70 (H) 09/19/2023     Estimated Creatinine Clearance: 34 mL/min (A) (based on SCr of 1.24 mg/dL (H)).    "

## 2023-11-07 NOTE — PATIENT INSTRUCTIONS
Preparing for Your Surgery      Name:  Priya Funez   MRN:  [id number]   :  1940   Today's Date:  2023       Arriving for surgery:  Surgery date:  2023  Arrival time:  7:30 am    Please come to:     Please come to:      [facility] [address]  [address] [place]  [address]      The [facility] Patient /Visitor Ramp is located at [address]. Patients and visitors who self-park will receive the reduced hospital parking rate. If the Patient /Visitor Ramp is full, please follow the signs to the  parking located at the main hospital entrance.     parking is available ( 24 hours/ 7 days a week)    Discounted parking pass options are available for patients and visitors. They can be purchased at the [employer] desk at the main hospital entrance.    -    Stop at the security desk and they will direct surgery patients to the 3rd floor Surgery Waiting Room. [phone]     -  If you are in need of directions, wheelchair or escort please stop at the Information/security desk in the lobby.       What can I eat or drink?  -  You may eat and drink normally up to 8 hours prior to arrival time. (Until Midnight)  -  You may have clear liquids until 2 hours prior to arrival time. (Until 5:30 am)    Examples of clear liquids:  Water  Clear broth  Juices (apple, white grape, white cranberry  and cider) without pulp  Noncarbonated, powder based beverages  (lemonade and Leroy-Aid)  Sodas (Sprite, 7-Up, ginger ale and seltzer)  Coffee or tea (without milk or cream)  Gatorade    -  No Alcohol or cannabis products for at least 24 hours before surgery.     Which medicines can I take?    Hold Multivitamins for 7 days before surgery.  Hold Supplements for 7 days before surgery.  Hold Ibuprofen (Advil, Motrin) for 1 day(s) before surgery--unless otherwise directed by surgeon.  Hold Naproxen (Aleve) for 4 days before  surgery.    Hold Jardiance (empagliflozin) for 3 days before surgery --last dose on 11/25/23    Plan for Warfarin---Hold for 5 days before surgery ---take last dose 11/23/2023    -  DO NOT take these medications the day of surgery:  Aspirin  Cholecalciferol  Vitamin B 12  Spironolactone   Torsemide       -  PLEASE TAKE these medications the day of surgery:  Inhaler/Neb per your routine  Escitalopram  Metoprolol        How do I prepare myself?  - Please take 2 showers (one the night prior to surgery and one the morning of surgery) using Scrubcare or Hibiclens soap.    Use this soap only from the neck to your toes.     Leave the soap on your skin for one minute--then rinse thoroughly.      You may use your own shampoo and conditioner. No other hair products.   - Please remove all jewelry and body piercings.  - No lotions, deodorants or fragrance.  - No makeup or fingernail polish.   - Bring your ID and insurance card.    -If you have a Deep Brain Stimulator, Spinal Cord Stimulator, or any Neuro Stimulator device---you must bring the remote control to the hospital.      ALL PATIENTS GOING HOME THE SAME DAY OF SURGERY ARE REQUIRED TO HAVE A RESPONSIBLE ADULT TO DRIVE AND BE IN ATTENDANCE WITH THEM FOR 24 HOURS FOLLOWING SURGERY.    Covid testing policy as of 12/06/2022  Your surgeon will notify and schedule you for a COVID test if one is needed before surgery--please direct any questions or COVID symptoms to your surgeon      Questions or Concerns:    - For any questions regarding the day of surgery or your hospital stay, please contact the Pre Admission Nursing Office at [phone].       - If you have health changes between today and your surgery, please call your surgeon.       - For questions after surgery, please call your surgeons office.           Current Visitor Guidelines    You may have 2 visitors in the pre op area.    Visiting hours: 8 a.m. to 8:30 p.m.    You may have four visitors during your inpatient  hospital stay.    Patients confirmed or suspected to have symptoms of COVID 19 or flu:     No visitors allowed for adult patients.   Children (under age 18) can have 1 named visitor.     People who are sick or showing symptoms of COVID 19 or flu:    Are not allowed to visit patients--we can only make exceptions in special situations.       Please follow these guidelines for your visit:          Please maintain social distance          Masking is optional--however at times you may be asked to wear a mask for the safety of yourself and others     Clean your hands with alcohol hand . Do this when you arrive at and leave the building and patient room,    And again after you touch your mask or anything in the room.     Go directly to and from the room you are visiting.     Stay in the patient s room during your visit. Limit going to other places in the hospital as much as possible     Leave bags and jackets at home or in the car.     For everyone s health, please don t come and go during your visit. That includes for smoking   during your visit.

## 2023-11-09 NOTE — PROGRESS NOTES
ANTICOAGULATION MANAGEMENT     Priya Funez 83 year old female is on warfarin with subtherapeutic INR result. (Goal INR 2.0-3.0)    Recent labs: (last 7 days)     11/09/23  1331   INR 1.7*       ASSESSMENT     Source(s): Chart Review and Patient/Caregiver Call     Warfarin doses taken: Warfarin taken as instructed  Diet: Increased greens/vitamin K in diet; plans to resume previous intake - just yesterday  Medication/supplement changes: None noted  New illness, injury, or hospitalization: Yes: severe symptomatic tricuspid valve regurgitation  Signs or symptoms of bleeding or clotting: No  Previous result: Subtherapeutic  Additional findings: Upcoming surgery/procedure Redo, TVR/R PFO closure, minimally invasive approach scheduled for 11/29/23 - see procedure plan below       PLAN     Recommended plan for temporary change(s) and ongoing change(s) affecting INR     Dosing Instructions: booster dose then Increase your warfarin dose (5.6% change) until procedure plan starts 11/24/23 with next INR in 2 weeks          RECOMMENDATION      Pre-Procedure:  Hold warfarin for 5 days, until after procedure starting: Friday, November 24   No Bridge     Post-Procedure:  Resume warfarin dose if okay with provider doing procedure on night of procedure, Wednesday, November 29 PM  Recheck INR ~ 3-7 days after discharge           Summary  As of 11/9/2023      Full warfarin instructions:  11/9: 4 mg; 11/24: Hold; 11/25: Hold; 11/26: Hold; 11/27: Hold; 11/28: Hold; Otherwise 2 mg every Mon, Fri; 3 mg all other days   Next INR check:  11/24/2023               Telephone call with Priya who verbalizes understanding and agrees to plan    Lab visit scheduled    Education provided:   Please call back if any changes to your diet, medications or how you've been taking warfarin  Symptom monitoring: monitoring for bleeding signs and symptoms and monitoring for clotting signs and symptoms    Plan made per [facility] anticoagulation  protocol    Lucrteia Go, RN  Anticoagulation Clinic  11/9/2023    _______________________________________________________________________     Anticoagulation Episode Summary       Current INR goal:  2.0-3.0   TTR:  56.8% (11.9 mo)   Target end date:  Indefinite   Send INR reminders to:  [facility]    Indications    Chronic atrial fibrillation (H) [I48.20]  Current use of long term anticoagulation (Resolved) [Z79.01]  Current use of long term anticoagulation [Z79.01]             Comments:               Anticoagulation Care Providers       Provider Role Specialty Phone number    Sharmaine Burks MD Referring Internal Medicine [phone]

## 2023-11-15 NOTE — TELEPHONE ENCOUNTER
Due to a change in the providers schedule, pt's 11/29 surgery needs to be r/s. LM asking pt to call back. Will try again later

## 2023-11-16 NOTE — TELEPHONE ENCOUNTER
Talked with pt and explained the change to the schedule. Offered r/s for 11/28 . Pt ok with that. Will call if anything changes

## 2023-11-22 NOTE — TELEPHONE ENCOUNTER
Spoke to patient on the phone to go over medication instruction changes for change in surgery date.     Coumadin take 11/22 as your last dose.  Jardiance 11/24 as your last dose.   Aspirin last dose 11/27  Lopressor is the only medication am of surgery on 11/28.     Patient able to teach back instructions.      SOMMER SEO RN\

## 2023-11-22 NOTE — PROGRESS NOTES
ANTICOAGULATION MANAGEMENT     Priya MARISOL Funez 83 year old female is on warfarin with therapeutic INR result. (Goal INR 2.0-3.0)    Recent labs: (last 7 days)     11/22/23  1554   INR 2.1*       ASSESSMENT     Source(s): Chart Review and Patient/Caregiver Call     Warfarin doses taken: Warfarin taken as instructed  Diet: No new diet changes identified  Medication/supplement changes: None noted  New illness, injury, or hospitalization: No  Signs or symptoms of bleeding or clotting: No  Previous result: Therapeutic last 2(+) visits  Additional findings: Upcoming surgery/procedure: Redo, TVR/R PFO closure, minimally invasive approach ~ surgery date changed to 11/28/23. ACRN reviewed 5 day hold w/o bridging today.       PLAN     Recommended plan for temporary change(s) affecting INR     Dosing Instructions: Continue your current warfarin dose tonight, then start 5 day procedure hold tomorrow with next INR in 2 weeks       Summary  As of 11/22/2023      Full warfarin instructions:  11/23: Hold; 11/24: Hold; 11/25: Hold; 11/26: Hold; 11/27: Hold; Otherwise 2 mg every Mon, Fri; 3 mg all other days   Next INR check:  12/6/2023               Telephone call with Priya who verbalizes understanding and agrees to plan    Lab visit scheduled    Education provided:   Please call back if any changes to your diet, medications or how you've been taking warfarin  Symptom monitoring: monitoring for bleeding signs and symptoms and monitoring for clotting signs and symptoms    Plan made per [facility] anticoagulation protocol    Lucretia Go, RN  Anticoagulation Clinic  11/22/2023    _______________________________________________________________________     Anticoagulation Episode Summary       Current INR goal:  2.0-3.0   TTR:  54.1% (11.9 mo)   Target end date:  Indefinite   Send INR reminders to:  [facility]    Indications    Chronic atrial fibrillation (H) [I48.20]  Current use of long term anticoagulation  (Resolved) [Z79.01]  Current use of long term anticoagulation [Z79.01]             Comments:               Anticoagulation Care Providers       Provider Role Specialty Phone number    Sharmaine Burks MD Referring Internal Medicine [phone]

## 2023-11-27 NOTE — ANESTHESIA PREPROCEDURE EVALUATION
Pre-Operative H & P     CC:  Preoperative exam to assess for increased cardiopulmonary risk while undergoing surgery and anesthesia.    Date of Encounter: 11/7/2023  Primary Care Physician:  Sharmaine Burks     Reason for visit:   No diagnosis found.      HPI  Priya Funez is a 83 year old female who presents for pre-operative H & P in preparation for  Procedure Information       Case: [id number] Date/Time: 11/28/23 1435    Procedures:       Minimally Invasive Tricuspid Valve Repair possible replacement, Transesophageal Echocardiogram (ALEX) and any associated procedures (Chest)      patent foramen ovale closure (Heart)    Anesthesia type: General with Block    Diagnosis:       Tricuspid valve regurgitation [I07.1]      Patent foramen ovale [Q21.12]      Atrial fibrillation (H) [I48.91]    Pre-op diagnosis:       Tricuspid valve regurgitation [I07.1]      Patent foramen ovale [Q21.12]      Atrial fibrillation (H) [I48.91]    Location:  OR [address] [place] [address] OR    Providers: Joyce Mabry MD            Patient is being evaluated for comorbid conditions of hypertension, atrial fibrillation, CHF, CRI, anxiety    Ms. Funez has severe symptomatic tricuspid valve regurgitation. She has been seen by Dr. Mabry for further evaluation. She is now scheduled for the above procedure.     History is obtained from the patient and chart review    Hx of abnormal bleeding or anti-platelet use: ASA, warfarin     Menstrual history: No LMP recorded. Patient is postmenopausal.     Past Medical History  Past Medical History:   Diagnosis Date    Benign essential hypertension     Cardiac pacemaker in situ     Medtronic    Chronic atrial fibrillation (H)     s/p AV node ablation and PPM placement January, 2019    Chronic diastolic heart failure (H)     Chronic kidney disease, stage III (moderate) (H)     Chronic right-sided heart failure (H)     NEDA (generalized anxiety disorder)     History of aortic dissection     s/p emergent  repair 2019    History of blood transfusion     no adverse reactions    Severe tricuspid regurgitation        Past Surgical History  Past Surgical History:   Procedure Laterality Date    ANGIOGRAM Right 01/04/2019    Procedure: DIAGONSTIC ANGIOGRAM OF SMA;  Surgeon: Rigo Jennings MD;  Location:  OR    BIOPSY BREAST      BYPASS GRAFT ARTERY CORONARY, REPAIR VALVE AORTIC, COMBINED N/A 01/04/2019    Procedure: AORTIC DISSECTION REPAIR WITH GRAFT- HEMASHIELD PLATINUM WOVEN DOUBLE VELOR 4 SIDE ARM VASCULAR GRAFT, D:35 X 12 X 10 X 10MM/ L:50CM;  Surgeon: Jose Winslow MD;  Location:  OR    CV CORONARY ANGIOGRAM N/A 8/29/2023    Procedure: Coronary Angiogram;  Surgeon: Ar Morales MD;  Location:  HEART CARDIAC CATH LAB    CV PCI N/A 8/29/2023    Procedure: Percutaneous Coronary Intervention;  Surgeon: Ar Morales MD;  Location:  HEART CARDI[facility] CATH LAB    CV RIGHT HEART CATH MEASUREMENTS RECORDED N/A 8/29/2023    Procedure: Right Heart Catheterization;  Surgeon: Ar Morales MD;  Location:  HEART CARDIAC CATH LAB    EP ABLATION AV NODE N/A 01/11/2019    Procedure: EP Ablation AV Node;  Surgeon: Tsering Davey MD;  Location:  HEART CARDIAC CATH LAB    EP LEAD EXTRACTION  7/31/2023    EP PACEMAKER Left 01/11/2019    Procedure: EP Pacemaker;  Surgeon: Tsering Davey MD;  Location:  HEART CARDIAC CATH LAB    EP PACEMAKER LEADLESS DEVICE IMPLANT  7/31/2023    EP PACEMAKER LEADLESS DEVICE IMPLANT N/A 7/31/2023    Procedure: Pacemaker Leadless Device Implant;  Surgeon: Gaston Car MD;  Location: [facility] OR    THYROID SURGERY      benign mass removed    TRACHEOSTOMY N/A 01/25/2019    Procedure: TRACHEOSTOMY  (DR MCCLELLAND);  Surgeon: Bryson Mcclelland MD;  Location:  OR    TRANSESOPHAGEAL ECHOCARDIOGRAM INTRAOPERATIVE N/A 6/16/2023    Procedure: ECHOCARDIOGRAM,TRANSESOPHAGEAL,WITH ANESTHESIA;  Surgeon: Dennis Meier DO;  Location: [facility] OR     TRANSESOPHAGEAL ECHOCARDIOGRAM INTRAOPERATIVE N/A 9/8/2023    Procedure: ECHOCARDIOGRAM,TRANSESOPHAGEAL,WITH ANESTHESIA;  Surgeon: Rios Heredia MD;  [facility]    TUBAL LIGATION         Prior to Admission Medications  Current Outpatient Medications   Medication Sig Dispense Refill    metoprolol tartrate (LOPRESSOR) 25 MG tablet Take 1 tablet (25 mg) by mouth 2 times daily 180 tablet 3    Acetaminophen 325 MG CAPS Take 325-650 mg by mouth every 4 hours as needed (Pain/Fever)      albuterol (PROAIR HFA/PROVENTIL HFA/VENTOLIN HFA) 108 (90 Base) MCG/ACT inhaler Inhale 2 puffs into the lungs every 6 hours as needed for shortness of breath / dyspnea or wheezing 18 g 0    aspirin (ASA) 81 MG EC tablet Take 81 mg by mouth daily      cholecalciferol 25 MCG (1000 UT) TABS Take 1,000 Units by mouth daily       cyanocobalamin (VITAMIN B-12) 1000 MCG tablet Take 1,000 mcg by mouth daily       empagliflozin (JARDIANCE) 10 MG TABS tablet Take 1 tablet (10 mg) by mouth daily 90 tablet 3    escitalopram (LEXAPRO) 10 MG tablet Take 1 tablet (10 mg) by mouth daily 90 tablet 3    mirtazapine (REMERON) 15 MG tablet Take 1 tablet (15 mg) by mouth At Bedtime 90 tablet 3    spironolactone (ALDACTONE) 25 MG tablet Take 1 tablet (25 mg) by mouth daily 90 tablet 3    torsemide (DEMADEX) 10 MG tablet Take 4 tablets(40mg) by mouth every day 360 tablet 3    warfarin ANTICOAGULANT (COUMADIN) 2 MG tablet Take 2 to 4mg (1 to 2 tabs) by mouth daily OR AS DIRECTED.  Adjust dose based on INR. 115 tablet 1       Allergies  Allergies   Allergen Reactions    Bactrim [Sulfamethoxazole-Trimethoprim]      pancytopenia    Amoxicillin Swelling     Face and body    Ciprofloxacin Hives       Social History  Social History     Socioeconomic History    Marital status:      Spouse name: Not on file    Number of children: Not on file    Years of education: Not on file    Highest education level: Not on file   Occupational History     Occupation: Retired - customer service   Tobacco Use    Smoking status: Never    Smokeless tobacco: Never   Vaping Use    Vaping Use: Never used   Substance and Sexual Activity    Alcohol use: Never    Drug use: Never    Sexual activity: Not on file   Other Topics Concern    Parent/sibling w/ CABG, MI or angioplasty before 65F 55M? Not Asked   Social History Narrative    .    Lives in home with . Fully independent.    4 adult children.    8 grandchildren.    Walks 2-3x/week.     Social Determinants of Health     Financial Resource Strain: Low Risk  (10/10/2023)    Financial Resource Strain     Within the past 12 months, have you or your family members you live with been unable to get utilities (heat, electricity) when it was really needed?: No   Food Insecurity: Low Risk  (10/10/2023)    Food Insecurity     Within the past 12 months, did you worry that your food would run out before you got money to buy more?: No     Within the past 12 months, did the food you bought just not last and you didn t have money to get more?: No   Transportation Needs: Low Risk  (10/10/2023)    Transportation Needs     Within the past 12 months, has lack of transportation kept you from medical appointments, getting your medicines, non-medical meetings or appointments, work, or from getting things that you need?: No   Physical Activity: Not on file   Stress: Not on file   Social Connections: Not on file   Interpersonal Safety: Not on file   Housing Stability: Low Risk  (10/10/2023)    Housing Stability     Do you have housing? : Yes     Are you worried about losing your housing?: No       Family History  Family History   Problem Relation Age of Onset    Ataxia Mother     Heart Disease Father     Diabetes No family hx of     Myocardial Infarction No family hx of     Cerebrovascular Disease No family hx of     Coronary Artery Disease Early Onset No family hx of     Breast Cancer No family hx of     Colon Cancer No family hx of      Ovarian Cancer No family hx of     Anesthesia Reaction No family hx of     Deep Vein Thrombosis (DVT) No family hx of        Review of Systems  The complete review of systems is negative other than noted in the HPI or here.   Anesthesia Evaluation   Pt has had prior anesthetic. Type: General and MAC.    No history of anesthetic complications       ROS/MED HX  ENT/Pulmonary: Comment: H/o trach in 2019   (-) tobacco use   Neurologic:  - neg neurologic ROS  (-) no seizures and no CVA   Cardiovascular: Comment: S/p surgery for aortic dissection in 2019    (+) Dyslipidemia hypertension- Peripheral Vascular Disease-   -  - -   Taking blood thinners   CHF  Last EF: 55-60 date: 9/2023     pacemaker,  type: Medtronic, settings: VVIR ,       dysrhythmias, a-fib,  valvular problems/murmurs  severe TR.    Previous cardiac testing   Echo: Date: 9/2023 Results:  Left ventricular size, wall motion and function are normal. The ejection  fraction is 55-60%.  The right ventricle is mildly dilated with normal systolic function.  There is malcoaptation of the tricuspid valve, primarily driven by the septal  leaflet resulting in very severe valvular regurgitation.  Compared to the prior study of 6/16/23 the right ventricular transvenous  pacemaker has been extracted. Severe tricuspid regurgitation persists.  ______________________________________________________________________________  Left Ventricle  Left ventricular size, wall motion and function are normal. The ejection  fraction is 55-60%. There is normal left ventricular wall thickness. Left  ventricular diastolic function is not assessable.     Right Ventricle  The right ventricle is mildly dilated. The right ventricular systolic function  is normal. A leadless pacemaker is visualized within the right ventricle.     Atria  Normal left atrial size. The right atrium is severely dilated. 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     Mitral Valve  Mitral valve leaflets appear  normal. There is no evidence of mitral stenosis  or clinically significant mitral regurgitation.     Tricuspid Valve  Tricuspid valve fails to coapt. There is severe (4+) tricuspid regurgitation.  The right ventricular systolic pressure is approximated at 23.4 mmHg plus the  right atrial pressure. There is no tricuspid stenosis.     Aortic Valve  Aortic valve leaflets appear normal. There is no evidence of aortic stenosis  or clinically significant aortic regurgitation. The aortic valve is  trileaflet.     Pulmonic Valve  The pulmonic valve is normal in structure and function.     Vessels  IVC diameter >2.1 cm collapsing <50% with sniff suggests a high RA pressure  estimated at 15 mmHg or greater. Hepatic vein flow consistent with severe  tricuspid insufficiency is present.     Pericardium  There is no pericardial effusion.    Stress Test:  Date: Results:    ECG Reviewed:  Date: 8/23/23 Results:    Ventricular-paced rhythm  Atrial fibrillation      Cath:  Date: 8/29/23 Results:  Diagnostic  Dominance: Right  Left Main  The vessel is large.    Left Circumflex  The vessel is large.    Right Coronary Artery  The vessel is large.      RHC  2. Mild pulmonary hypertension with high-normal left-sided filling pressures and moderately elevated right-sided filling pressures.    3. Large V waves on RA tracing suggestive of severe TR  4. Moderate-severely reduced cardiac index by Rajani.      METS/Exercise Tolerance:  Comment: Walking with walker- walking around stores. Can go up and down stairs. ALEXANDER   Hematologic:     (+)       history of blood transfusion, no previous transfusion reaction,     (-) history of blood clots   Musculoskeletal:  - neg musculoskeletal ROS     GI/Hepatic:  - neg GI/hepatic ROS  (-) GERD   Renal/Genitourinary:     (+) renal disease, type: CRI, Pt does not require dialysis,           Endo:    (-) chronic steroid usage   Psychiatric/Substance Use:     (+) psychiatric history anxiety       Infectious  Disease:  - neg infectious disease ROS     Malignancy:  - neg malignancy ROS     Other:            There were no vitals taken for this visit.    Physical Exam   Constitutional: Awake, alert, cooperative, no apparent distress, and appears stated age.  Eyes: Pupils equal, round and reactive to light, extra ocular muscles intact, sclera clear, conjunctiva normal.  HENT: Normocephalic, oral pharynx with moist mucus membranes, good dentition.  Respiratory: Clear to auscultation bilaterally, no crackles or wheezing.  Cardiovascular: Regular rate and rhythm, systolic murmur noted.  Carotids no bruits. No edema. Palpable pulses to radial arteries.   GI: Normal bowel sounds, soft, non-distended, non-tender  Genitourinary:  deferred  Skin: Warm and dry.  Musculoskeletal: Full ROM of neck. There is no redness, warmth, or swelling of the exposed joints. Gross motor strength is normal .    Neurologic: Awake, alert, oriented to name, place and time. Cranial nerves II-XII are grossly intact.   Neuropsychiatric: Calm, cooperative. Normal affect.     Prior Labs/Diagnostic Studies   All labs and imaging personally reviewed     EKG/ stress test - if available please see in ROS above   Echo result w/o MOPS: Interpretation Summary 1. Left ventricular size, wall motion and function are normal. The ejectionfraction is 55-60%.2. Right ventricle is mildly dilated with normal systolic function. A leadlesspacemaker is visualized within the right ventricle.3. Right atrium is severely dilated.4. A patent foramen ovale is identified.5. There is malcoaptation of the tricuspid valve, primarily due to the septalleaflet which appears small in size. This results in very severe valvularregurgitation. The other two leaflets appear normal in thickness and mobility.The coaptation gap on the septal aspect of the tricuspid valve is 16 mm.6. There is an intimal flap and dissection plane in the aortic arch extendingdown the descending aorta. The dissection  plane appears filled with thrombusand is without flow by color doppler.     Echo result w/o MOPS: Interpretation Summary Left ventricular size, wall motion and function are normal. The ejectionfraction is 55-60%.The right ventricle is mildly dilated with normal systolic function.There is malcoaptation of the tricuspid valve, primarily driven by the septalleaflet resulting in very severe valvular regurgitation.Compared to the prior study of 6/16/23 the right ventricular transvenouspacemaker has been extracted. Severe tricuspid regurgitation persists.           No data to display                  The patient's records and results personally reviewed by this provider.     Outside records reviewed from: Care Everywhere    LAB/DIAGNOSTIC STUDIES TODAY:  CBC, CMP, T&S    Assessment    Priya Funez is a 83 year old female seen as a PAC referral for risk assessment and optimization for anesthesia.    Plan/Recommendations  Pt will be optimized for the proposed procedure.  See below for details on the assessment, risk, and preoperative recommendations    NEUROLOGY  - No history of TIA, CVA or seizure  -Post Op delirium risk factors:  Age    ENT  -h/o difficult intubation. Last airway below:    Placement Date: 07/31/23; Placement Time: 0820 (created via procedure documentation); Mask Ventilation: 2; Induction Type: Intravenous; Ease of Intubation: Easy; Technique: Direct laryngoscopy; ETT Type: Single; Tube Size: 7 mm (passes easily post trach); DL Blade Size: Lynch 2; Grade View: 1; Adjucts: Stylet; Placement Person: CRNA; Attempts: 1; Depth: 21 cm     Mallampati: III  TM: > 3    CARDIAC  -hypertension using lopressor  -atrial fibrillation. Using warfarin. Plan to hold x5 days prior to surgery  -medtronic pacemaker present. Last interrogation 11/1/23  -h/o aortic dissection s/p emergent surgical intervention 2019  -severe TR with the above procedure planned  CHF, last EF 55-60%. Will hold jardiance x3 days per protocol  "  -previous cardiac testing above    PULMONARY  MANNY Low Risk            Total Score: 2    MANNY: Hypertension    MANNY: Over 50 ys old      - Denies asthma or inhaler use  - Tobacco History    History   Smoking Status    Never   Smokeless Tobacco    Never       GI  PONV High Risk  Total Score: 3           1 AN PONV: Pt is Female    1 AN PONV: Patient is not a current smoker    1 AN PONV: Intended Post Op Opioids        /RENAL  - Baseline Creatinine 1.2, stable     ENDOCRINE    - BMI: Estimated body mass index is 26.68 kg/m  as calculated from the following:    Height as of 11/7/23: 1.613 m (5' 3.5\").    Weight as of 11/7/23: 69.4 kg (153 lb).  Overweight (BMI 25.0-29.9)  - No history of Diabetes Mellitus    HEME  VTE Low Risk 0.26%            Total Score: 1    VTE: Greater than 59 yrs old      - Coagulopathy second to Warfarin (Coumadin, Jantoven)  - Platelet disfunction second to Aspirin (Felicia, many others)  -will update CBC and T&S today     PSYCH  - anxiety, stable     Different anesthesia methods/types have been discussed with the patient, but they are aware that the final plan will be decided by the assigned anesthesia provider on the date of service.    The patient is optimized for their procedure. AVS with information on surgery time/arrival time, meds and NPO status given by nursing staff. No further diagnostic testing indicated.      On the day of service:     Prep time: 11 minutes  Visit time: 15 minutes  Documentation time: 12 minutes  ------------------------------------------  Total time: 38 minutes      Melania Poole PA-C  Preoperative Assessment Center  [facility]  Phone: [phone]  Fax: [phone]    Physical Exam    Airway        Mallampati: II   TM distance: > 3 FB   Neck ROM: full   Mouth opening: > 3 cm    Respiratory Devices and Support         Dental           Cardiovascular          Rhythm and rate: irregular and normal   (+) murmur   "     Pulmonary   pulmonary exam normal        breath sounds clear to auscultation           Anesthesia Plan    ASA Status:  4    NPO Status:  NPO Appropriate    Anesthesia Type: General.     - Airway: ETT   Induction: Intravenous.   Maintenance: Balanced.   Techniques and Equipment:     - Airway: Double lumen ETT     - Lines/Monitors: Arterial Line, Central Line, PAC, CVP, BIS, NIRS, ALEX            ALEX Absolute Contra-indication: NONE            ALEX Relative Contra-indication: NONE     - Blood: Cell Saver, PRBC, Blood in Room, T&S     - Drips/Meds: Epinephrine, Norepi     Consents    Anesthesia Plan(s) and associated risks, benefits, and realistic alternatives discussed. Questions answered and patient/representative(s) expressed understanding.     - Discussed:     - Discussed with:  Patient      - Extended Intubation/Ventilatory Support Discussed: Yes.      - Patient is DNR/DNI Status: No     Use of blood products discussed: Yes.     - Discussed with: Patient.     - Consented: consented to blood products     Postoperative Care    Pain management: Multi-modal analgesia.   PONV prophylaxis: Ondansetron (or other 5HT-3), Dexamethasone or Solumedrol     Comments:    Other Comments: 83 yo F s/p acute Type A aortic dissection repair on 1/4/2019, s/p AV node ablation and PPM implantation subsequently in 2019 with severe TR. She presents to the OR for TV repair vs replacement.    Plan for GETA w/ arterial line, ALEX and CVC +/- PAC.

## 2023-11-28 PROBLEM — I07.1 TRICUSPID REGURGITATION: Status: ACTIVE | Noted: 2023-01-01

## 2023-11-28 NOTE — ANESTHESIA PROCEDURE NOTES
Airway       Patient location during procedure: OR       Procedure Start/Stop Times: 11/28/2023 1:45 PM  Staff -        Anesthesiologist:  William Car MD       Resident/Fellow: Nilson Estrada MD       Performed By: fellow  Consent for Airway        Urgency: elective  Indications and Patient Condition       Indications for airway management: andriy-procedural       Induction type:intravenous       Mask difficulty assessment: 1 - vent by mask    Final Airway Details       Final airway type: endotracheal airway       Successful airway: ETT - double lumen left  Endotracheal Airway Details        Cuffed: yes       Successful intubation technique: video laryngoscopy       VL Blade Size: MAC 3       Grade View of Cords: 1       Position: Center       Measured from: gums/teeth       Secured at (cm): 28       Bite block used: None       ETT Double lumen (fr): 37    Post intubation assessment        Placement verified by: capnometry and equal breath sounds        Number of attempts at approach: 1       Number of other approaches attempted: 0       Secured with: tape       Ease of procedure: easy       Dentition: Intact    Medication(s) Administered   Medication Administration Time: 11/28/2023 1:45 PM

## 2023-11-28 NOTE — ANESTHESIA PROCEDURE NOTES
Central Line/PA Catheter Placement    Pre-Procedure   Staff -        Anesthesiologist:  William Car MD       Resident/Fellow: Nilson Estrada MD       Performed By: fellow       Location: OR       Pre-Anesthestic Checklist: patient identified, IV checked, site marked, risks and benefits discussed, informed consent, monitors and equipment checked, pre-op evaluation and at physician/surgeon's request  Timeout:       Correct Patient: Yes        Correct Procedure: Yes        Correct Site: Yes        Correct Position: Yes        Correct Laterality: Yes   Line Placement:   This line was placed Pre Induction starting at 11/28/2023 2:54 PM and ending at 11/28/2023 2:54 PM    Procedure   Procedure: central line       Laterality: right       Insertion Site: internal jugular.  Sterile Prep        All elements of maximal sterile barrier technique followed       Patient Prep/Sterile Barriers: draped, hand hygiene, gloves , hat , mask , draped, gown, sterile gel and probe cover       Skin prep: Chloraprep  Insertion/Injection        Technique: ultrasound guided and Seldinger Technique        1. Ultrasound was used to evaluate the access site.       2. Vein evaluated via ultrasound for patency/adequacy.       3. Using real-time ultrasound the needle/catheter was observed entering the artery/vein.       4. Permanent image was captured and entered into the patient's record.       5. The visualized structures were anatomically normal.       6. There were no apparent abnormal pathologic findings.       Introducer Type: 9 Fr, 2-lumen MAC        Type: PA/CVC with Introducer       Catheter Size: 7 Fr       Catheter Length: 8       Number of Lumens: double lumen       PA Catheter Type: Thermodilution         Appropriate RV, RA and PA waveforms noted:  Yes            Balloon down at end of the procedure:   Narrative         Secured by: suture       Tegaderm dressing used.       Complications: None apparent,        blood aspirated  from all lumens,        Verification method: Ultrasound

## 2023-11-28 NOTE — ANESTHESIA PROCEDURE NOTES
Paravertebral Procedure Note    Pre-Procedure   Staff -        Anesthesiologist:  Ciro Sexton MD       Resident/Fellow: Opal More MD       Other Anesthesia Staff: Terry Millan MD       Performed By: resident       Location: pre-op       Procedure Start/Stop Times: 11/28/2023 12:15 PM and 11/28/2023 12:40 PM       Pre-Anesthestic Checklist: patient identified, IV checked, site marked, risks and benefits discussed, informed consent, monitors and equipment checked, pre-op evaluation, at physician/surgeon's request and post-op pain management  Timeout:       Correct Patient: Yes        Correct Procedure: Yes        Correct Site: Yes        Correct Position: Yes        Correct Laterality: Yes        Site Marked: Yes  Procedure Documentation  Procedure: Paravertebral       Diagnosis: POST OPERATIVE PAIN CONTROL       Laterality: right       Patient Position: prone       Patient Prep/Sterile Barriers: sterile gloves, mask       Skin prep: Chloraprep       Local skin infiltrated with mL of 1% lidocaine.        Needle Type: insulated       Needle Gauge: 21.        Needle Length (Inches): 4        Ultrasound guided       1. Ultrasound was used to identify targeted nerve, plexus, vascular marker, or fascial plane and place a needle adjacent to it in real-time.       2. Ultrasound was used to visualize the spread of anesthetic in close proximity to the above referenced structure.       3. A permanent image is entered into the patient's record.    Assessment/Narrative         The placement was negative for: blood aspirated, painful injection and site bleeding       Paresthesias: No.       Bolus given via catheter..        Secured via Dermabond and Tegaderm.        Insertion/Infusion Method: Continuous Infusion       Complications: none    Medication(s) Administered   Medication Administration Time: 11/28/2023 12:15 PM      FOR [facility] ([facility]) ONLY:   Pain Team Contact information: please page the Pain  "Team Via [facility]. Search \"Pain\". During daytime hours, please page the attending first. At night please page the resident first.      "

## 2023-11-28 NOTE — OR NURSING
Right Paravertebral block performed without complications.  VSS.  Pt tolerated well.  Will continue to monitor.

## 2023-11-28 NOTE — ANESTHESIA PROCEDURE NOTES
Perioperative ALEX Procedure Note    Staff -        Anesthesiologist:  Glo Samaneigo MD       Resident/Fellow: Nilson Estrada MD       Performed By: fellow  Preanesthesia Checklist:  Patient identified, IV assessed, risks and benefits discussed, monitors and equipment assessed, procedure being performed at surgeon's request and anesthesia consent obtained.    ALEX Probe Insertion    Probe Status PRE Insertion: NO obvious damage  Probe type:  Adult 3D  Bite block used:   Soft  Insertion Technique: Easy, no oropharyngeal manipulation  Insertion complications: None obvious  Billing Report:ALEX report by Anesthesiologist (See Separate Report note)  Probe Status POST Removal: NO obvious damage    ALEX Report  General Procedure Information  Images for this study have been archived.  Modalities: 2D, 3D, CW Doppler, PW Doppler and Color flow mapping  Echocardiographic and Doppler Measurements  Right Ventricle:  Cavity size dilated.    Hypertrophy not present.   Thrombus not present.    Global function mildly impaired.     Left Ventricle:  Cavity size normal.    Hypertrophy not present.   Thrombus not present.   Global Function normal.       Ventricular Regional Function:  1- Basal Anteroseptal:  normal  2- Basal Anterior:  normal  3- Basal Anterolateral:  normal  4- Basal Inferolateral:  normal  5- Basal Inferior:  normal  6- Basal Inferoseptal:  normal  7- Mid Anteroseptal:  normal  8- Mid Anterior:  normal  9- Mid Anterolateral:  normal  10- Mid Inferolateral:  normal  11- Mid Inferior:  normal  12- Mid Inferoseptal:  normal  13- Apical Anterior:  normal  14- Apical Lateral:  normal  15- Apical Inferior:  normal  16- Apical Septal:  normal  17- Sanderson:  normal    Valves  Aortic Valve: Annulus normal.  Stenosis not present.  Regurgitation absent.  Leaflets normal.  Leaflet motions normal.    Mitral Valve: Annulus normal.  Stenosis not present.  Regurgitation +1.  Leaflets normal.  Leaflet motions normal.    Tricuspid  Valve: Annulus normal.  Stenosis not present.  Regurgitation +4.  Leaflets normal.    Pulmonic Valve: Annulus normal.  Stenosis not present.  Regurgitation absent.    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   Aorta: Ascending Aorta: Plaque thickness less than 3 mm.  Mobile plaque not present.    Aortic Arch: Size normal.     Plaque thickness less than 3 mm.   Mobile plaque not present.    Descending Aorta: Size normal.    Dissection present.   Plaque thickness less than 3 mm.   Mobile plaque not present.    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   Spontaneous echo contrast not present.   Thrombus not present.   Tumor not present.   Device not present.     Left Atrium: Size normal.  Spontaneous echo contrast not present.  Thrombus not present.  Tumor not present.  Device not present.    Left atrial appendage normal.     Atrial Septum: Intra-atrial septal morphology contains patent foramen ovale.   Patent foramen ovale shunts left to right.      Ventricular Septum: Intra-ventricular septum morphology normal.        Other Findings:   Pericardium:  normal. Pleural Effusion:  none. Pulmonary Arteries:  normal. Pulmonary Venous Flow:  normal.     Post Intervention Findings  Procedure(s) performed:  Tricuspid Valve Repair/Replace. Global function:  Unchanged. Regional wall motion: Unchanged. Surgeon(s) notified of all postintervention findings: Yes.  Tricuspid valve: Valve replaced with bioprosthetic valve. No Paravalvular leak.           Post Intervention comments: Post bypass: LV function unchanged. No new RWMA. RV showing slight reduction in function from baseline post bypass but improving on epi drip. New bioprosthetic valve in the tricuspid position that is well seated without paravalvular leaks. Gradient 3. The pulmonic,  tricuspid, and aortic valves are unchanged. The aorta has no new dissection on ECHO and has a stable thrombus and false lumen from previous dissection..    Echocardiogram Comments

## 2023-11-28 NOTE — ANESTHESIA PROCEDURE NOTES
Arterial Line Procedure Note    Pre-Procedure   Staff -        Anesthesiologist:  William Car MD       Resident/Fellow: Nilson Estrada MD       Performed By: fellow       Location: pre-op       Pre-Anesthestic Checklist: patient identified, IV checked, risks and benefits discussed, informed consent, monitors and equipment checked, pre-op evaluation and at physician/surgeon's request  Timeout:       Correct Patient: Yes        Correct Procedure: Yes        Correct Site: Yes        Correct Position: Yes   Line Placement:   This line was placed Pre Induction starting at 11/28/2023 1:00 PM and ending at 11/28/2023 1:10 PM  Procedure   Procedure: arterial line       Laterality: left       Insertion Site: radial.  Sterile Prep        Standard elements of sterile barrier followed       Skin prep: Chloraprep  Insertion/Injection        Technique: ultrasound guided and Seldinger Technique        1. Ultrasound was used to evaluate the access site.       2. Artery evaluated via ultrasound for patency/adequacy.       3. Using real-time ultrasound the needle/catheter was observed entering the artery/vein.       Catheter Type/Size: 20 G, 12 cm  Narrative        Tegaderm dressing used.       Complications: None apparent,        Arterial waveform: Yes        IBP within 10% of NIBP: Yes

## 2023-11-29 NOTE — ANESTHESIA POSTPROCEDURE EVALUATION
Patient: Priya Funez    Procedure: Procedure(s):  Right thoracotomy on cardiopulmonary bypass. Minimally Invasive Tricuspid Valve replacement using a 31mm Abbott Epic Plus Mitral Valve.  Intraoperative Transesophageal Echocardiogram (ALEX) per Anesthesia.  patent foramen ovale closure       Anesthesia Type:  General    Note:  Disposition: ICU            ICU Sign Out: Anesthesiologist/ICU physician sign out WAS performed   Postop Pain Control: Uneventful            Sign Out: Well controlled pain   PONV: No   Neuro/Psych: Uneventful            Sign Out: Acceptable/Baseline neuro status   Airway/Respiratory:             Sign Out: AIRWAY IN SITU/Resp. Support               Airway in situ/Resp. Support: ETT                 Reason: Planned Pre-op   CV/Hemodynamics: Uneventful            Sign Out: Acceptable CV status; No obvious hypovolemia; No obvious fluid overload   Other NRE: NONE   DID A NON-ROUTINE EVENT OCCUR? No    Event details/Postop Comments:  Patient transported directly from the OR to the ICU sedated and intubated.  Vitals stable on NE 0.02, epi 0.07.  Paralytic reversed.  Report given to ICU.           Last vitals:  Vitals:    11/28/23 1320 11/28/23 1923 11/28/23 1930   BP: 100/65     Pulse: 80 80 80   Resp: (P) 16 17 22   Temp:  36.5  C (97.7  F) 36.5  C (97.7  F)   SpO2: 91% 100% 100%       Electronically Signed By: Glo Samaniego MD  November 28, 2023  7:37 PM

## 2023-11-29 NOTE — PROGRESS NOTES
Brief Anesthesia RAPS Note:    Patient arrived to CVICU with significant right sided chest pain. She had her PV catheter pump started in the OR but had not received an additional bolus. Discussed with patient and decided to give a 0.2% ropivacaine clinician bolus of 5 ml's. Patient noted almost immediate improvement in her pain. No signs or symptoms of LAST. ICU aware to continue monitoring vitals over the next 30 minutes. Patient will be able to better communicate after extubation. Will plan to reevaluate again in the morning to determine if an additional bolus or pump setting adjustments are necessary.     Aaron Dee MD  Anesthesia PGY-4

## 2023-11-29 NOTE — H&P
CV ICU H&P    ASSESSMENT: Priya Funez is a 83 year old female with PMH of HFpEF, severe TR, AF s/p AVN ablation with PPM (leadless placed 7/31/23), aortic dissection s/p emergent repair 1/2019, HTN, NEDA, CKD and obesity. Presents to [facility] for replacement of tricuspid valve via mini-thoracotomy approach by Dr. Mabry on 11/28/23.    Arrives from the CVICU intubated and sedated on propofol. On  epi gtt, norepi gtt for pressor and inotropic support. Tissue TV R (epic 31 mm), 2 CT placed, remains paced with leadless PPM at 80 BPM. UOP reported as adequate intra-op. Had some hypoxia at end of case, improved with recruitment procedures by anesthesia team.     Required 420 cell saver, 1.5 L crystalloid. Access is RIJ with [place], R rad A line. Reversed. Pre procedure Echo LVEF normal biventricular function, Post procedure slightly sluggish RV with normal LV. Valve function looked appropriate. Plan to fast track.     CO-MORBIDITIES:   Patient Active Problem List   Diagnosis    SOB (shortness of breath)    Chronic diastolic heart failure (H)    Chronic atrial fibrillation (H)    Benign essential hypertension    Chronic kidney disease, stage III (moderate) (H)    Chronic right-sided heart failure (H)    Severe tricuspid regurgitation    History of aortic dissection    NEDA (generalized anxiety disorder)    History of blood transfusion    Cardiac pacemaker in situ    Complete heart block (H)    Atrial pacemaker lead displacement    Displacement of atrial pacemaker leads, initial encounter    Thrombocytopenia (H24)    Chronic diastolic congestive heart failure (H)    C. difficile colitis    Current use of long term anticoagulation    Tricuspid regurgitation       PLAN:  Neuro/ pain/ sedation:  - Monitor neurological status. Notify the MD for any acute changes in exam.  #Acute postoperative pain  # Hx NEDA  - Scheduled: tylenol   - PRN: oxycodone, dilaudid, robaxin  - Paravetebral catheter in place with ropivacaine.  - PTA  lexapro  - HOLD PTA remeron    #Sedation  - Gtt:propofol while intubated  - RASS goal 0 to -1    Pulmonary care:   # Mechanical ventilation  # Acute hypoxic respiratory failure  # History of tracheostomy (2019)  Vent Mode: CMV/AC  (Continuous Mandatory Ventilation/ Assist Control)  Resp Rate (Set): 20 breaths/min  Tidal Volume (Set, mL): 450 mL  PEEP (cm H2O): 5 cmH2O  Resp: 22   - Goal SpO2 > 92%  - Ventilator bundle. PST, wean to extubate when meets criteria.  - Encourage IS q15-30 minutes when awake.  - Reported PO2 in the 70s despite being on 100% FiO2 intra-op. Immediate post-op ABG with adequate PO2. Will wean FiO2 according to bedside monitor.       Cardiovascular:    # S/p TV replacement 11/28/2023  # Cardiogenic shock  # Hx HFpEF  # Hx Afib s/p AVN ablation  # PPM, upgraded to leadless 7/2023  # Hx HTN  # Hx HLD  # Hx Aortic dissection s/p emergent repair 2019  - Epi, NE, Vaso gtts for inotropic and pressor support, wean as able  - Monitor hemodynamic status.   - Goal MAP >65, SBP <140  - Hold Statin   - Hold BB   - Hold PTA jardiance, spironalactone, torsemide, ASA  - ASA: start tomorrow  - Device nurse consult to interrogate PPM, currently VOO  - PA catheter unable to be floated past new valve in OR by anesthesia team. Will remove as catheter is leaking into sterile repositioning sleeve. Trend CVPs via MAC introducer.     GI care / Nutrition:   # At risk for protein malnutrition  # Hx diverticulosis  - NPO, bedside swallow eval once extubated  - Nutrition consulted, appreciate recommendations  - PPI for bowel prophylaxis  - Bowel regimen: MiraLAX, senna    Renal / Fluids / Electrolytes:   # volume status  # electrolyte derangement  # CKD  # Lactic acidosis  BL creat appears to be ~ 1.2-1.3  - Strict I/O, daily weights  - Avoid/limit nephrotoxins as able  - Replete lytes PRN per protocol  - Initial LA 3.1, will hydrate with IVF as indicated; trend lactates Q4H until normalized.     Endocrine:    # Stress  "hyperglycemia  Preop A1c 5.7  - Insulin gtt  - Goal BG <180 for optimal healing    ID / Antibiotics:  # Stress induced leukocytosis  - No s/sx infection  - To complete perioperative regimen  - Continue to monitor fever curve, WBC, and inflammatory markers as appropriate    Heme:     # Acute blood loss anemia  # Acute blood loss thrombocytopenia  No s/sx active bleeding  - Continue to monitor  - CBC on arrival and daily  - Hgb goal > 7.0  - Two chest tubes in place, one MS and one Pleural.     MSK / Skin:  #s/p Thoracotomy  #Surgical Incision  - Sternal precautions  - Postoperative incision management per protocol  - PT/OT/CR    Prophylaxis:     - Mechanical ppx for DVT  - Chemical DVT ppx: start SQH tomorrow  - PPI  - Bowel regimen: PPI, MiraLAX, senna    Lines / Tubes / Drains:  - ETT  - RIJ CVC, PA catheter  - Arterial Line  - CTs x2  - Barrientos  - OG to be placed    Disposition:  - CVICU    Patient seen, findings and plan discussed with CVTS staff. Will be discussed with CVICU staff in AM.     Total Critical Care time spent by me, excluding procedures, was 60 minutes.    TAMRA Conrad CNP      Clinically Significant Risk Factors Present on Admission        # Hypokalemia: Lowest K = 3.1 mmol/L in last 2 days, will replace as needed   # Hypocalcemia: Lowest iCa = 3.8 mg/dL in last 2 days, will monitor and replace as appropriate      # Drug Induced Coagulation Defect: home medication list includes an anticoagulant medication  # Drug Induced Platelet Defect: home medication list includes an antiplatelet medication   # Hypertension: Noted on problem list  # Chronic heart failure with preserved ejection fraction: heart failure noted on problem list and last echo with EF >50%     # Overweight: Estimated body mass index is 27.21 kg/m  as calculated from the following:    Height as of this encounter: 1.613 m (5' 3.5\").    Weight as of this encounter: 70.8 kg (156 lb 1.4 oz).        # Pacemaker present  # History " of CABG: noted on surgical history    # Anemia: based on hgb <11             ====================================    HPI:   Presents to CVICU intubated and sedated.      PAST MEDICAL HISTORY:   Past Medical History:   Diagnosis Date    Benign essential hypertension     Cardiac pacemaker in situ     Medtronic    Chronic atrial fibrillation (H)     s/p AV node ablation and PPM placement January, 2019    Chronic diastolic heart failure (H)     Chronic kidney disease, stage III (moderate) (H)     Chronic right-sided heart failure (H)     NEDA (generalized anxiety disorder)     History of aortic dissection     s/p emergent repair 2019    History of blood transfusion     no adverse reactions    Severe tricuspid regurgitation        PAST SURGICAL HISTORY:   Past Surgical History:   Procedure Laterality Date    ANGIOGRAM Right 01/04/2019    Procedure: DIAGONSTIC ANGIOGRAM OF SMA;  Surgeon: Rigo Jennings MD;  Location:  OR    BIOPSY BREAST      BYPASS GRAFT ARTERY CORONARY, REPAIR VALVE AORTIC, COMBINED N/A 01/04/2019    Procedure: AORTIC DISSECTION REPAIR WITH GRAFT- HEMASHIELD PLATINUM WOVEN DOUBLE VELOR 4 SIDE ARM VASCULAR GRAFT, D:35 X 12 X 10 X 10MM/ L:50CM;  Surgeon: Jose Winslow MD;  Location:  OR    CV CORONARY ANGIOGRAM N/A 8/29/2023    Procedure: Coronary Angiogram;  Surgeon: Ar Morales MD;  Location:  HEART CARDIAC CATH LAB    CV PCI N/A 8/29/2023    Procedure: Percutaneous Coronary Intervention;  Surgeon: Ar Morales MD;  Location:  HEART CARDIAC CATH LAB    CV RIGHT HEART CATH MEASUREMENTS RECORDED N/A 8/29/2023    Procedure: Right Heart Catheterization;  Surgeon: Ar Morales MD;  Location:  HEART CARDIAC CATH LAB    EP ABLATION AV NODE N/A 01/11/2019    Procedure: EP Ablation AV Node;  Surgeon: Tsering Davey MD;  Location:  HEART CARDIAC CATH LAB    EP LEAD EXTRACTION  7/31/2023    EP PACEMAKER Left 01/11/2019    Procedure: EP Pacemaker;   Surgeon: Tsering Davey MD;  Location:  HEART CARDIAC CATH LAB    EP PACEMAKER LEADLESS DEVICE IMPLANT  7/31/2023    EP PACEMAKER LEADLESS DEVICE IMPLANT N/A 7/31/2023    Procedure: Pacemaker Leadless Device Implant;  Surgeon: Gaston Car MD;  Location: [facility] OR    THYROID SURGERY      benign mass removed    TRACHEOSTOMY N/A 01/25/2019    Procedure: TRACHEOSTOMY  (DR MCCLELLAND);  Surgeon: Bryson Mcclelalnd MD;  Location:  OR    TRANSESOPHAGEAL ECHOCARDIOGRAM INTRAOPERATIVE N/A 6/16/2023    Procedure: ECHOCARDIOGRAM,TRANSESOPHAGEAL,WITH ANESTHESIA;  Surgeon: Dennis Meier DO;  Location: [facility] OR    TRANSESOPHAGEAL ECHOCARDIOGRAM INTRAOPERATIVE N/A 9/8/2023    Procedure: ECHOCARDIOGRAM,TRANSESOPHAGEAL,WITH ANESTHESIA;  Surgeon: Rios Heredia MD;  Location: [facility] OR    TUBAL LIGATION         FAMILY HISTORY:   Family History   Problem Relation Age of Onset    Ataxia Mother     Heart Disease Father     Diabetes No family hx of     Myocardial Infarction No family hx of     Cerebrovascular Disease No family hx of     Coronary Artery Disease Early Onset No family hx of     Breast Cancer No family hx of     Colon Cancer No family hx of     Ovarian Cancer No family hx of     Anesthesia Reaction No family hx of     Deep Vein Thrombosis (DVT) No family hx of        SOCIAL HISTORY:   Social History     Tobacco Use    Smoking status: Never    Smokeless tobacco: Never   Substance Use Topics    Alcohol use: Never         OBJECTIVE:  1. VITAL SIGNS:   Temp:  [97.7  F (36.5  C)-98.1  F (36.7  C)] 97.7  F (36.5  C)  Pulse:  [60-80] 80  Resp:  [14-22] 22  BP: ()/(55-77) 100/65  MAP:  [69 mmHg-72 mmHg] 69 mmHg  Arterial Line BP: (93-99)/(54-55) 93/54  SpO2:  [91 %-100 %] 100 %    Vent Mode: CMV/AC  (Continuous Mandatory Ventilation/ Assist Control)  Resp Rate (Set): 20 breaths/min  Tidal Volume (Set, mL): 450 mL  PEEP (cm H2O): 5 cmH2O  Resp: 22        2. INTAKE/ OUTPUT:   No intake/output data  recorded.      3. PHYSICAL EXAMINATION:   General: sedated  Neuro: sedated  Resp: intubated, ventilated  CV: S1, S2, RRR  Abdomen: Soft, non-distended, non-tender  Incisions: c/d/i  Extremities: warm and well perfused  CT: To suction, serosang output, no airleak, crepitus      4. INVESTIGATIONS:   Arterial Blood Gases   Recent Labs   Lab 11/28/23 2006 11/28/23 1813 11/28/23 1743 11/28/23  1715   PH 7.41 7.41 7.45 7.43   PCO2 35 43 41 44   PO2 209* 72* 267* 265*   HCO3 22 27 28 29*     Complete Blood Count   Recent Labs   Lab 11/28/23 1818 11/28/23 1813 11/28/23 1743 11/28/23  1715   WBC 18.0*  --   --   --    HGB 9.7* 10.0* 9.3* 9.6*     --   --   --      Basic Metabolic Panel  Recent Labs   Lab 11/28/23 2015 11/28/23 1813 11/28/23 1743 11/28/23 1715 11/28/23  1644   NA  --  139 139 138 138   POTASSIUM  --  3.7 4.0 4.1 4.5   * 174* 157* 150* 131*     Liver Function Tests  Recent Labs   Lab 11/28/23 1818 11/22/23  1554   INR 1.68* 2.1*     Pancreatic Enzymes  No lab results found in last 7 days.  Coagulation Profile  Recent Labs   Lab 11/28/23 1818 11/22/23  1554   INR 1.68* 2.1*   PTT 30  --          5. RADIOLOGY:   Recent Results (from the past 24 hour(s))   POC US Guidance Needle Placement    Narrative    Paravertebral catheter placement     Impression    Paravertebral catheter placement      ALEX with Report    Narrative    Glo Samaniego MD     11/28/2023  7:40 PM  Perioperative ALEX Procedure Note    Staff -        Anesthesiologist:  Glo Samaniego MD       Resident/Fellow: Nilson Estrada MD       Performed By: fellow  Preanesthesia Checklist:  Patient identified, IV assessed, risks and   benefits discussed, monitors and equipment assessed, procedure being   performed at surgeon's request and anesthesia consent obtained.    ALEX Probe Insertion    Probe Status PRE Insertion: NO obvious damage  Probe type:  Adult 3D  Bite block used:   Soft  Insertion Technique: Easy, no  oropharyngeal manipulation  Insertion complications: None obvious  Billing Report:ALEX report by Anesthesiologist (See Separate Report note)  Probe Status POST Removal: NO obvious damage    ALEX Report  General Procedure Information  Images for this study have been archived.  Modalities: 2D, 3D, CW Doppler, PW Doppler and Color flow mapping  Echocardiographic and Doppler Measurements  Right Ventricle:  Cavity size dilated.    Hypertrophy not present.     Thrombus not present.    Global function mildly impaired.     Left Ventricle:  Cavity size normal.    Hypertrophy not present.     Thrombus not present.   Global Function normal.       Ventricular Regional Function:  1- Basal Anteroseptal:  normal  2- Basal Anterior:  normal  3- Basal Anterolateral:  normal  4- Basal Inferolateral:  normal  5- Basal Inferior:  normal  6- Basal Inferoseptal:  normal  7- Mid Anteroseptal:  normal  8- Mid Anterior:  normal  9- Mid Anterolateral:  normal  10- Mid Inferolateral:  normal  11- Mid Inferior:  normal  12- Mid Inferoseptal:  normal  13- Apical Anterior:  normal  14- Apical Lateral:  normal  15- Apical Inferior:  normal  16- Apical Septal:  normal  17- [place]:  normal    Valves  Aortic Valve: Annulus normal.  Stenosis not present.  Regurgitation   absent.  Leaflets normal.  Leaflet motions normal.    Mitral Valve: Annulus normal.  Stenosis not present.  Regurgitation +1.    Leaflets normal.  Leaflet motions normal.    Tricuspid Valve: Annulus normal.  Stenosis not present.  Regurgitation +4.    Leaflets normal.    Pulmonic Valve: Annulus normal.  Stenosis not present.  Regurgitation   absent.    Other Valve Findings:  Tricuspid valve has severe regurgitant jet with   hepatic vein reversal.   Aorta: Ascending Aorta: Plaque thickness less than 3 mm.  Mobile plaque   not present.    Aortic Arch: Size normal.     Plaque thickness less than 3 mm.   Mobile   plaque not present.    Descending Aorta: Size normal.    Dissection present.    Plaque thickness   less than 3 mm.   Mobile plaque not present.    Other Aortic Findings:  The descending aorta has a chronic dissection with   thrombus consistent with previous imaging as patient is s/p dissection fix  Right Atrium:  Size dilated.   Spontaneous echo contrast not present.     Thrombus not present.   Tumor not present.   Device not present.     Left Atrium: Size normal.  Spontaneous echo contrast not present.    Thrombus not present.  Tumor not present.  Device not present.    Left atrial appendage normal.     Atrial Septum: Intra-atrial septal morphology contains patent foramen   ovale.   Patent foramen ovale shunts left to right.      Ventricular Septum: Intra-ventricular septum morphology normal.        Other Findings:   Pericardium:  normal. Pleural Effusion:  none. Pulmonary Arteries:    normal. Pulmonary Venous Flow:  normal.     Post Intervention Findings  Procedure(s) performed:  Tricuspid Valve Repair/Replace. Global function:    Unchanged. Regional wall motion: Unchanged. Surgeon(s) notified of all   postintervention findings: Yes.  Tricuspid valve: Valve replaced with   bioprosthetic valve. No Paravalvular leak.           Post Intervention comments: Post bypass: LV function unchanged. No new   RWMA. RV showing slight reduction in function from baseline post bypass   but improving on epi drip. New bioprosthetic valve in the tricuspid   position that is well seated without paravalvular leaks. Gradient 3. The   pulmonic, tricuspid, and aortic valves are unchanged. The aorta has no new   dissection on ECHO and has a stable thrombus and false lumen from previous   dissection..    Echocardiogram Comments   Cardiac Device Check - Inpatient   Result Value    Date Time Interrogation Session [id number]    Implantable Pulse Generator  Medtronic    Implantable Pulse Generator Model [id number] Micra VR TCP    Implantable Pulse Generator Serial Number [id number]    Type Interrogation  Session In Clinic    Clinic Name [facility]    Implantable Pulse Generator Type Pacemaker    Implantable Pulse Generator Implant Date [id number]    Festus Setting Mode (NBG Code) VOO    Festus Setting Lower Rate Limit 80    Festus Setting Hysterisis Rate DISABLED    Lead Channel Setting Sensing Polarity Bipolar    Lead Channel Setting Sensing Anode Location Right Ventricle    Lead Channel Setting Sensing Anode Terminal Ring    Lead Channel Setting Sensing Cathode Location Right Ventricle    Lead Channel Setting Sensing Cathode Terminal Tip    Lead Channel Setting Pacing Polarity Bipolar    Lead Channel Setting Pacing Anode Location Right Ventricle    Lead Channel Setting Pacing Anode Terminal Ring    Lead Channel Setting Sensing Cathode Location Right Ventricle    Lead Channel Setting Sensing Cathode Terminal Tip    Lead Channel Setting Pacing Pulse Width 0.24    Lead Channel Setting Pacing Amplitude 3    Zone Setting Type Category VF    Zone Setting Vendor Type Category V High Rate    Zone Setting Type Category VT    Zone Setting Vendor Type Category FastVT    Zone Setting Type Category VT    Zone Setting Vendor Type Category VT    Zone Setting Type Category VT    Zone Setting Vendor Type Category MonVT    Zone Setting Type Category ATRIAL_FIBRILLATION    Zone Setting Vendor Type Category FastATAF    Zone Setting Type Category AT/AF    Lead Channel Impedance Value 500    Lead Channel Pacing Threshold Amplitude 1.875    Lead Channel Pacing Threshold Pulse Width 0.24    Battery Date Time of Measurements [id number]    Battery Status OK    Battery RRT Trigger 2.5579    Battery Remaining Longevity 50    Battery Voltage 3.00    Festus Statistic Date Time Start [id number]    Festus Statistic Date Time End [id number]    Festus Statistic RV Percent Paced 99.96    Narrative    Patient seen on [facility]'s 3C for evaluation and iterative programming of their pacemaker per MD orders. Pre-op  TVR.    Device: Medtronic [id number] Micra VR TCP  Normal device function.   Mode: VOO 80 bpm  : 100%  Intrinsic Rhythm:  @ 30 bpm. No ventricular escape rhythm  Short V-V intervals:   Lead Trends Appear Stable.   Estimated battery longevity to RRT = 5.4 years. Battery voltage = 3 V.    Setting Changes: VVIR  bpm to VOO 80 bpm    Plan: Page pacemaker nurse for post-op programming..  ESA Monae RN    Leadless pacemaker    I have reviewed and interpreted the device interrogation, settings, programming and nurse's summary. The device is functioning within normal device parameters. I agree with the current findings, assessment and plan.       =========================================

## 2023-11-29 NOTE — PROGRESS NOTES
"Pain Service Progress Note  [facility]  Date: 11/29/2023       Patient Name: Priya Funez  MRN: [id number]  Age: 83 year old  Sex: female      Assessment:  Priya Funez is a 83 year old female who has PMH of  HFpEF, severe TR, AF s/p AVN ablation with PPM (leadless placed 7/31/23), aortic dissection s/p emergent repair on Jan. 2019, HTN, NEDA, CKD. Underwent replacement of tricuspid valve via mini-thoracotomy approach by Dr. Mabry on 11/28/23.     Procedure:  replacement of tricuspid valve via mini-thoracotomy    Date of Surgery: 11/28/23    Date of Catheter Placement: 11/28/23    Plan/Recommendations:  1. Regional Anesthesia/Analgesia  -Continuous Catheter Type/Site: right paravertebral (PV) T4-5  Infusate: 0.2% Ropivacane    Programmed Intermittent Bolus (PIB) at 7 mL Q60 min via each catheter, total infusion rate of 7 mL/hr    Plan to maintain catheter, max of 7 days    2. Anticoagulation  -Please contact Inpatient Pain Service before ordering or making any anticoagulation changes       3. Multimodal Analgesia  - per primary service    Pain Service will continue to follow.    Discussed with attending anesthesiologist    Cindy Mooney PA-C  11/29/2023     Overnight Events: extubated overnight. Required Bipap-respiratory status improving.    Tubes/Drains: Yes  Chest tubes    Subjective:  She reports \"no pain.\"      Symptoms of LAST: No    Pain Location:  No pain (chest)    Pain Intensity:    Pain at Rest: 0/10   Pain with Activity: 0/10 (sitting up so far)    Satisfied with your level of pain control: Yes    Diet: Advance Diet as Tolerated: Clear Liquid Diet    Relevant Labs:  Recent Labs   Lab Test 11/29/23  0342 11/28/23 2005   INR  --  1.44*    215   PTT  --  34   BUN 28.0* 25.0*       Physical Exam:  Vitals: /65   Pulse 82   Temp 98.1  F (36.7  C)   Resp 13   Ht 1.613 m (5' 3.5\")   Wt 71.8 kg (158 lb 4.6 oz)   SpO2 99%   BMI 27.60 kg/m      Physical " Exam:   Orientation:  Alert, oriented, and in no acute distress: Yes  Sedation: No    Motor Examination:  5/5 Strength in lower extremities: Yes      Catheter Site:   Catheter entry site is clean/dry/intact: Yes    Tender: No      Relevant Medications:  Current Pain Medications:  Medications related to Pain Management (From now, onward)      Start     Dose/Rate Route Frequency Ordered Stop    12/01/23 0000  acetaminophen (TYLENOL) tablet 650 mg         650 mg Oral EVERY 4 HOURS PRN 11/28/23 1921      11/29/23 1200  acetaminophen (TYLENOL) Suppository 650 mg         650 mg Rectal EVERY 6 HOURS SCHEDULED 11/29/23 1046      11/29/23 1100  aspirin (ASA) Suppository 300 mg         300 mg Rectal DAILY 11/29/23 1046      11/29/23 0800  polyethylene glycol (MIRALAX) Packet 17 g         17 g Oral DAILY 11/28/23 1921 11/28/23 2000  senna-docusate (SENOKOT-S/PERICOLACE) 8.6-50 MG per tablet 1 tablet         1 tablet Oral 2 TIMES DAILY 11/28/23 1921 11/28/23 1921  methocarbamol (ROBAXIN) tablet 500 mg         500 mg Oral EVERY 8 HOURS PRN 11/28/23 1921      11/28/23 1921  magnesium hydroxide (MILK OF MAGNESIA) suspension 30 mL         30 mL Oral DAILY PRN 11/28/23 1921      11/28/23 1921  bisacodyl (DULCOLAX) suppository 10 mg         10 mg Rectal DAILY PRN 11/28/23 1921      11/28/23 1921  HYDROmorphone (DILAUDID) injection 0.1 mg        See Hyperspace for full Linked Orders Report.    0.1 mg Intravenous EVERY 2 HOURS PRN 11/28/23 1921      11/28/23 1921  HYDROmorphone (DILAUDID) injection 0.2 mg        See Hyperspace for full Linked Orders Report.    0.2 mg Intravenous EVERY 2 HOURS PRN 11/28/23 1921      11/28/23 1921  oxyCODONE IR (ROXICODONE) half-tab 2.5 mg        See Hyperspace for full Linked Orders Report.    2.5 mg Oral EVERY 4 HOURS PRN 11/28/23 1921      11/28/23 1921  oxyCODONE (ROXICODONE) tablet 5 mg        See Hyperspace for full Linked Orders Report.    5 mg Oral EVERY 4 HOURS PRN 11/28/23 1928       "11/28/23 1921  lidocaine 1 % 0.1-1 mL         0.1-1 mL Other EVERY 1 HOUR PRN 11/28/23 1921      11/28/23 1921  lidocaine (LMX4) cream          Topical EVERY 1 HOUR PRN 11/28/23 1921 11/28/23 1300  ROPivacaine 0.2% in NS perineural infusion (simple)          Perineural Continuous Nerve Block 11/28/23 1249                        Acute Inpatient Pain Service [facility]  Hours of pain coverage 24/7   Page via Amcom- Please Page the Pain Team Via Amcom: \"PAIN MANAGEMENT ACUTE INPATIENT/ [facility]\"            "

## 2023-11-29 NOTE — PROGRESS NOTES
Extubate order per MD. Patient alert awake, breathing well on PS; suctioned minimal secretions. Cuff deflated with positive cuff leak, extubated to 4lpm NC, patient able to vocalize. Good non productive cough. SpO2 96%. No complications. RN bedside

## 2023-11-29 NOTE — OP NOTE
Date of Service: November 28th, 2023    Referring Cardiologist: Rios Heredia MD    Preoperative Diagnosis: severe symptomatic tricuspid valve regurgitation, patent foramen ovale (PFO)    Postoperative Diagnosis: severe symptomatic tricuspid valve regurgitation, patent foramen ovale (PFO)    Surgeon: Joyce Mabry MD    Assistant(s): WELLINGTON Jolly, Tommy Holloway NP    Name of Operation: Right minithoracotomy tricuspid valve replacement with 31mm [place] EPIC porcine bioprosthetic valve, PFO closure, right common femoral arterial and venous cannulation for cardiopulmonary bypass, intraoperative ALEX.    Anesthesia: general orotracheal, double-lumen tube    Indications for Procedure: Mrs. Funez is a very pleasant 83-year-old female who underwent an acute Type A aortic dissection repair in 2019.  She also required an AV leo ablation and a permanent pacemaker implantation subsequently.  She then developed severe tricuspid valve regurgitation.  The pacemaker lead was removed and the pacer system was upgraded to a wireless system in hopes that she could be a candidate for percutaneous tricuspid valve repair/clip but she was unfortunately not a candidate to enroll in the CLASP II trial due to her tricuspid valve anatomy.  She was eventually referred to me for surgical evaluation for tricuspid valve repair/replacement.  She was taken to the operating today for a minimally invasive tricuspid repair/replacement via a right minithoracotomy approach.  We also planned on closing the PFO.      Operative Finding: The patient had a mildly diminished RV systolic function.  She had significant right atrial enlargement.  The PFO was identified and closed.  Her tricuspid valve regurgitation was severe and it appeared to be functional with significant annular dilatation, but the leaflets were also retracted and sclerosed and therefore it had to be replaced.      Description of the Procedure: After informed consent was  obtained, the patient was brought down to the operating room and was placed on the OR table in the supine position.  Intravenous and intra-arterial lines were begun.  While monitoring her blood pressure and EKG tracing, she was anesthetized and intubated using a double-lumen endotracheal tube.  Her entire chest, abdomen, both groins and legs were prepped down to the knees using multiple layers of DuraPrep.  She was draped in a sterile field.  A right minithoracotomy incision was made in the fourth intercostal space was entered.  An [employer] soft tissue would retractor and the [employer] minithoracotomy rib retractor were used for exposure.  The right lung was isolated so we could take down the adhesions.  She had minimal pulmonary adhesions.  The patient was then fully heparinized and a small right groin incision was made and the right common femoral artery and vein were dissected out.  5-0 Prolene pursestring sutures were used to cannulate the right common femoral artery and vein using a 17 [place] femoral arterial cannula and a 25 [place] multistage venous cannula, respectively.  After appropriate ACT level was achieved, cardiopulmonary bypass was established and the patient was kept normothermic during the entire operation.    The pericardial adhesions were then taken down to expose the right atrium, SVC and around the IVC so we could put umbilical tapes around the SVC and IVC to snared them down to the to go on total cardiopulmonary bypass.  Carbon dioxide was flooded into the chest to prevent air embolism.  A right atriotomy was then made and the [facility] CSV retractor was used to expose the tricuspid valve.  Findings were noted above.  We decided to move forward with a tricuspid valve replacement.  Annular sutures were placed using horizontal mattress sutures of 2-0 Ethibond with supra-annular pledgets.  The annulus was sized to a 31 mm EPIC valve.  The valve was then brought to the field and all sutures  were brought through the sewing ring and the valve was seated down without difficulty.  All sutures were tied down securely using the Cor-Knot device.  We then identified a PFO which was closed using figure-of-eight 4-0 Prolene.  The atriotomy was then closed after de-airing the right atrium using with running 4-0 Prolene in 2 layers.  The umbilical tapes were removed around the SVC and IVC and the patient was weaned off cardiopulmonary bypass with low-dose inotropic and vasopressor support.  RV function was back to his baseline.  The tricuspid valve was seated down well with no paravalvular leak.  There was no flow across the repaired PFO.  Once the patient remained stable off bypass, the femoral venous cannula was removed and protamine was given.  The femoral arterial cannula was subsequently removed as well.  The groin incision was irrigated out and was closed in layers of running Vicryl suture.  The skin was closed using 3-0 Vicryl and was sealed using Dermabond.    The right lung reinflated nicely.  24 [place] Heri drain was placed in the right pleural space and a 28 [place] straight chest tube in the mediastinum.  These were all brought out percutaneously below the minithoracotomy incision and secured to the skin using 2-0 Ethibond.  Mediastinal hemostasis was obtained.  The rib space was reapproximated using #5 Ethibond suture.  The minithoracotomy incision was finally closed in layers of running Vicryl suture.  The skin was closed using 3-0 Vicryl and was sealed using Dermabond. Total cardiopulmonary bypass time was 116 minutes.    There were no intraoperative complications and the patient tolerated the operation well.  No blood products were given intraoperatively.  All sponge counts, needle counts, and instrument counts were correct x 2 at the end of the operation.  Specimen removed: none. The patient was brought to the ICU in hemodynamically stable condition and remained intubated.    Joyce Mabry  MD

## 2023-11-29 NOTE — PROGRESS NOTES
CLINICAL NUTRITION SERVICES - BRIEF NOTE    Received provider consult for nutrition education with comments post op cardiovascular surgery (automatic consult on post-op order set). S/p TVR, PFO closure on 11/28. Nutrition education not indicated.    RD will follow per LOS protocol or if re-consulted.     Radha Foley, MS, RD, LD  4A (CVICU) RD pager: [phone]  Ascom: [id number]  Weekend/Holiday RD pager: [phone]

## 2023-11-29 NOTE — PLAN OF CARE
Goal Outcome Evaluation:    Plan of Care Reviewed With: patient    Overall Patient Progress: no change    Arrived from OR around 715pm was vented on propofol with plan to fast track. Able to wean off sedation and extubate patient at 0000 after being on pressure support of 8 and peep of 5 from [id number]. Pt tolerated well for a couple hours, then became anxious, tachypneic, hypercarbic, and acidotic. Started BIPAP at 10/5. Ph now 7.17 with PCO2 of 71. Course lung sounds Pt is lethargic but following all commands. No complaints of pain since extubation. Maps have been 60s-70s with all pulses palpable. Perm pacer VVI at 80bpm completely paced. Needs EP consult to adjust settings back to baseline. Lungs are course with some wheezing. Tried Albuterol inhaler and neb. Pulled swan and connected CVP to right CVC. Left radial a-line and left PIV. Barrientos with lower UO around 20-30 ml/hr.     Now on BIPAP 15/5 with rate 20. Plan to get new gas at 0630 and possibly re-intubate.      Pt status:    Temp:  [97.7  F (36.5  C)-99.5  F (37.5  C)] 97.7  F (36.5  C)  Pulse:  [60-87] 80  Resp:  [11-36] 17  BP: ()/(55-77) 100/65  MAP:  [61 mmHg-269 mmHg] 76 mmHg  Arterial Line BP: ()/() 107/58  FiO2 (%):  [40 %-100 %] 50 %  SpO2:  [91 %-100 %] 94 %

## 2023-11-29 NOTE — PROGRESS NOTES
CV ICU PROGRESS NOTE  November 29, 2023      CO-MORBIDITIES:   S/P TVR (tricuspid valve replacement)  (primary encounter diagnosis)  Patient Active Problem List   Diagnosis    SOB (shortness of breath)    Chronic diastolic heart failure (H)    Chronic atrial fibrillation (H)    Benign essential hypertension    Chronic kidney disease, stage III (moderate) (H)    Chronic right-sided heart failure (H)    Severe tricuspid regurgitation    History of aortic dissection    NEDA (generalized anxiety disorder)    History of blood transfusion    Cardiac pacemaker in situ    Complete heart block (H)    Atrial pacemaker lead displacement    Displacement of atrial pacemaker leads, initial encounter    Thrombocytopenia (H24)    Chronic diastolic congestive heart failure (H)    C. difficile colitis    Current use of long term anticoagulation    Tricuspid regurgitation        ASSESSMENT: Priya Funez is a 83 year old female with PMH of HFpEF, severe TR, AF s/p AVN ablation with PPM (leadless placed 7/31/23), aortic dissection s/p emergent repair 1/2019, HTN, NEDA, CKD and obesity. Presents to [facility] for replacement of tricuspid valve via mini-thoracotomy approach by Dr. Mabry on 11/28/23.     Last 24 hours:  - Extubated overnight   - Became hypercapnic s/p extubation and initiated on BiPAP  - Lactate increased to 3.2 from 2.4 (peak was 4.4) but improved after multiple 500 mL LR fluid boluses.     Today's Progress:  - Improving acidosis with BiPAP  - Trial off BiPAP later today   - NPO until off BiPAP, then BSSE and ADAT   - ABG Q4H  - Wean NE and Epi as tolerated   - Trend CVP Q4H  - Trend Lactate Q4H, downtrending   - Continue flow tract to monitor CO and CI   - Lasix 60 mg IV bolus x1 now, will follow up UOP response   - Consider additional diuresis vs fluid bolus dependent on CVP, pressor requirement, and lactate.   - Start ASA per CVTS   - Daily CMP, monitor LFTs   - Device Nurse Adjusted PPM, will need to return prior to  discharge to restore settings if tolerated      PLAN:  Neuro/ pain/ sedation:  # Risk for delirium  Currently A&Ox3   - Monitor neurological status.   - Notify the MD for any acute changes in exam.  #Acute postoperative pain  - Scheduled: tylenol   - PRN: tylenol, oxycodone, dilaudid, robaxin  - Paravetebral catheter in place with ropivacaine.  # Hx NEDA  - PTA lexapro/Escitalopram   - HOLD PTA remeron/mirtazapine for restless leg  #Sedation, resolved  Off all sedation, extubated 11/28     Pulmonary care:   # Acute hypercapnic and hypoxic respiratory failure, requiring BiPAP  # S/P Mechanical ventilation, extubated 11/28  # History of tracheostomy (2019)  - Goal SpO2 > 92%  - Encourage IS q15-30 minutes when awake.  - Continue BiPAP   - Follow ABG Q4H  - DUONEB   - Possible trial off BiPAP this afternoon if continuing to improve   - Repeat CXR this morning largely unchanged from previous      Cardiovascular:    # S/p TV replacement 11/28/2023  # Cardiogenic shock, requiring epi   # Vasoplegic shock, requiring norepi   # PPM, upgraded to leadless 7/2023  # Hx HFpEF  # Hx Afib s/p AVN ablation  # Hx HTN  # Hx HLD  # Hx Aortic dissection s/p emergent repair 2019  - Monitor hemodynamic status.   - Goal MAP >65, SBP <140  - Epi gtt for inotropic support, wean as able   - NE gtt for pressor support, wean as able  - Hold Statin   - Hold BB, still on pressors   - Hold PTA jardiance, spironalactone, torsemide  - Start ASA today as ordered per CVTS  - Device nurse consulted for PPM: currently VVI @ 80. Will need device nurse to return prior to discharge to restore her PTA settings with rate around 60, as tolerated.   - Continue Flow Tract and monitor CI and CO   - CVP checks Q4H   - ABG Q4H  - ABG/VBG w/ mixed venous PRN     GI care / Nutrition:   # Elevated AST    # At risk for protein malnutrition  # Hx diverticulosis  - NPO, until off BiPAP >2 hours, then BSSE and ADAT  - Nutrition consulted, appreciate recommendations  -  PTA AST was 27, today elevated to 86  - Holding statin until AST normalizes.   - Continue to follow LFTs w/ daily CMP   - PPI for bowel prophylaxis  - Bowel regimen: MiraLAX, senna, PRN Milk of mag and suppository      Renal / Fluids / Electrolytes:   # Volume status   # Lactic acidosis,improving  # Electrolyte derangement  # Hx of CKD  BL creat appears to be ~ 1.2-1.3  - Strict I/O, daily weights  - Avoid/limit nephrotoxins as able  - Replete lytes PRN per protocol  - Peak lactate of 4.4 11/28 @ 2147, now downtrending s/p fluid boluses   - Give Lasix 60 mg IV now   - Follow up UPO 1 hour after for responsiveness to dose   - If her pressor requirement and lactate increase, will consider fluid bolus instead of continued diuresis.   - Trend lactates Q4H until normalized   - FBG largely dependent on CVP, pressor requirement, and lactate trends      Endocrine:    # Stress hyperglycemia  Preop A1c 5.7  - Insulin gtt  - Goal BG <180 for optimal healing     ID / Antibiotics:  # Stress induced leukocytosis  - Afebrile. No s/sx infection.   - To complete perioperative regimen  - Continue to monitor fever curve, WBC, and inflammatory markers as appropriate     Heme:     # Acute blood loss anemia, improved   No s/sx active bleeding  - Continue to monitor  - CBC on arrival and daily  - Hgb goal > 7.0  - Two chest tubes in place, one MS and one Pleural.      MSK / Skin:  # s/p Thoracotomy  # Rib fracture s/p retraction   # Surgical Incision  - Right Thoracotomy precautions  - Postoperative incision management per protocol  - Multimodal pain management as above   - OOB to chair  - PT/OT/CR     Prophylaxis:     - Mechanical ppx for DVT  - Chemical DVT ppx: SQH   - PPI  - Bowel regimen: MiraLAX, senna      Lines / Tubes / Drains:  - RIJ CVC  - Arterial Line  - CTs x2  - Barrientos     Disposition:  - CVICU      ICU Checklist  F - Feeding: NPO while on BiPAP, then BSSE and ADAT   A - Analgesia: multi-modal with paravertebral cath,  scheduled and PRN oral medications  S - Sedation: Off  T - Thromboembolic prophylaxis: SCDs, SQH      H - Head of bed elevated: yes   U - Ulcer prophylaxis: PPI  G - Glycemic control: Insulin gtt   S - SBT: Trial off BiPAP today      B - Bowel regimen: Miralax, Senna, PRN milk of mag and suppository  I - Indwelling catheter: Barrientos   D - De-escalation of antibiotics: To complete perioperative regimen.     Patient seen, findings and plan discussed with CVICU staff    Quin Langford  Anesthesiology CA-1/PGY-2      ====================================    SUBJECTIVE:   She states she is doing okay, but having issues breathing (on BiPAP), but tolerating increased rate on BiPAP this morning. States she wants to get out of bed and walk. Requesting to remove BiPAP and eat and walk.     OBJECTIVE:   1. VITAL SIGNS:   Temp:  [97.7  F (36.5  C)-99.5  F (37.5  C)] 97.7  F (36.5  C)  Pulse:  [60-87] 80  Resp:  [11-36] 17  BP: ()/(55-77) 100/65  MAP:  [61 mmHg-269 mmHg] 76 mmHg  Arterial Line BP: ()/() 107/58  FiO2 (%):  [40 %-100 %] 50 %  SpO2:  [91 %-100 %] 94 %  Vent Mode: CPAP/PS  (Continuous positive airway pressure with Pressure Support) (PS per MD order)  FiO2 (%): 50 %  Resp Rate (Set): 20 breaths/min  Tidal Volume (Set, mL): 450 mL  PEEP (cm H2O): 5 cmH2O  Pressure Support (cm H2O): 7 cmH2O  Resp: 17      2. INTAKE/ OUTPUT:   I/O last 3 completed shifts:  In: 2853.37 [I.V.:2433.37; Other:420]  Out: 681 [Urine:570; Chest Tube:111]    3. PHYSICAL EXAMINATION:   General: NAD, HOB elevated lying supine.   Neuro: A&Ox3. Moves all extremities purposefully.  Follows commands. Answers questions appropriately.   Resp: on BiPAP 15/5 FiO2 50%, sating >92%, RR 20. Coarse breath sounds bilaterally. No wheezing appreciated.   CV: Paced at 80. Extremities well perfused.   Abdomen: Soft, Non-distended, Non-tender  Incisions: c/d/I, CTx2, no air leaks, draining serosanguinous fluid.   Extremities: warm, well perfused,  no pitting edema appreciated.     4. INVESTIGATIONS:   Arterial Blood Gases   Recent Labs   Lab 11/29/23  0540 11/29/23 0322 11/29/23 0233 11/28/23  2303   PH 7.17* 7.21* 7.24* 7.30*   PCO2 71* 64* 57* 45   PO2 94 83 85 88   HCO3 26 26 25 22     Complete Blood Count   Recent Labs   Lab 11/29/23 0342 11/28/23 2005 11/28/23 1818 11/28/23 1813   WBC 27.3* 23.8* 18.0*  --    HGB 12.1 12.8 9.7* 10.0*    215 195  --      Basic Metabolic Panel  Recent Labs   Lab 11/29/23 0543 11/29/23 0342 11/29/23 0323 11/29/23 0220 11/28/23 2015 11/28/23 2005 11/28/23 1813 11/28/23  1743   NA  --  139  --   --   --  140 139 139   POTASSIUM  --  4.4  4.4  --   --   --  3.3* 3.7 4.0   CHLORIDE  --  104  --   --   --  100  --   --    CO2  --  21*  --   --   --  20*  --   --    BUN  --  28.0*  --   --   --  25.0*  --   --    CR  --  1.30*  --   --   --  1.11*  --   --    * 154* 143* 169*   < > 214* 174* 157*    < > = values in this interval not displayed.     Liver Function Tests  Recent Labs   Lab 11/29/23 0342 11/28/23 2005 11/28/23 1818 11/22/23  1554   AST 86*  --   --   --    ALT 23 23  --   --    ALKPHOS 81 87  --   --    BILITOTAL 1.2 1.7*  --   --    ALBUMIN 3.5 3.5  --   --    INR  --  1.44* 1.68* 2.1*     Pancreatic Enzymes  No lab results found in last 7 days.  Coagulation Profile  Recent Labs   Lab 11/28/23 2005 11/28/23 1818 11/22/23  1554   INR 1.44* 1.68* 2.1*   PTT 34 30  --          5. RADIOLOGY:   Recent Results (from the past 24 hour(s))   POC US Guidance Needle Placement    Narrative    Paravertebral catheter placement     Impression    Paravertebral catheter placement      ALEX with Report    Narrative    Glo Samaniego MD     11/28/2023  7:40 PM  Perioperative ALEX Procedure Note    Staff -        Anesthesiologist:  Glo Samaniego MD       Resident/Fellow: Nilson Estrada MD       Performed By: fellow  Preanesthesia Checklist:  Patient identified, IV assessed, risks and    benefits discussed, monitors and equipment assessed, procedure being   performed at surgeon's request and anesthesia consent obtained.    ALEX Probe Insertion    Probe Status PRE Insertion: NO obvious damage  Probe type:  Adult 3D  Bite block used:   Soft  Insertion Technique: Easy, no oropharyngeal manipulation  Insertion complications: None obvious  Billing Report:ALEX report by Anesthesiologist (See Separate Report note)  Probe Status POST Removal: NO obvious damage    ALEX Report  General Procedure Information  Images for this study have been archived.  Modalities: 2D, 3D, CW Doppler, PW Doppler and Color flow mapping  Echocardiographic and Doppler Measurements  Right Ventricle:  Cavity size dilated.    Hypertrophy not present.     Thrombus not present.    Global function mildly impaired.     Left Ventricle:  Cavity size normal.    Hypertrophy not present.     Thrombus not present.   Global Function normal.       Ventricular Regional Function:  1- Basal Anteroseptal:  normal  2- Basal Anterior:  normal  3- Basal Anterolateral:  normal  4- Basal Inferolateral:  normal  5- Basal Inferior:  normal  6- Basal Inferoseptal:  normal  7- Mid Anteroseptal:  normal  8- Mid Anterior:  normal  9- Mid Anterolateral:  normal  10- Mid Inferolateral:  normal  11- Mid Inferior:  normal  12- Mid Inferoseptal:  normal  13- Apical Anterior:  normal  14- Apical Lateral:  normal  15- Apical Inferior:  normal  16- Apical Septal:  normal  17- [place]:  normal    Valves  Aortic Valve: Annulus normal.  Stenosis not present.  Regurgitation   absent.  Leaflets normal.  Leaflet motions normal.    Mitral Valve: Annulus normal.  Stenosis not present.  Regurgitation +1.    Leaflets normal.  Leaflet motions normal.    Tricuspid Valve: Annulus normal.  Stenosis not present.  Regurgitation +4.    Leaflets normal.    Pulmonic Valve: Annulus normal.  Stenosis not present.  Regurgitation   absent.    Other Valve Findings:  Tricuspid valve has severe  regurgitant jet with   hepatic vein reversal.   Aorta: Ascending Aorta: Plaque thickness less than 3 mm.  Mobile plaque   not present.    Aortic Arch: Size normal.     Plaque thickness less than 3 mm.   Mobile   plaque not present.    Descending Aorta: Size normal.    Dissection present.   Plaque thickness   less than 3 mm.   Mobile plaque not present.    Other Aortic Findings:  The descending aorta has a chronic dissection with   thrombus consistent with previous imaging as patient is s/p dissection fix  Right Atrium:  Size dilated.   Spontaneous echo contrast not present.     Thrombus not present.   Tumor not present.   Device not present.     Left Atrium: Size normal.  Spontaneous echo contrast not present.    Thrombus not present.  Tumor not present.  Device not present.    Left atrial appendage normal.     Atrial Septum: Intra-atrial septal morphology contains patent foramen   ovale.   Patent foramen ovale shunts left to right.      Ventricular Septum: Intra-ventricular septum morphology normal.        Other Findings:   Pericardium:  normal. Pleural Effusion:  none. Pulmonary Arteries:    normal. Pulmonary Venous Flow:  normal.     Post Intervention Findings  Procedure(s) performed:  Tricuspid Valve Repair/Replace. Global function:    Unchanged. Regional wall motion: Unchanged. Surgeon(s) notified of all   postintervention findings: Yes.  Tricuspid valve: Valve replaced with   bioprosthetic valve. No Paravalvular leak.           Post Intervention comments: Post bypass: LV function unchanged. No new   RWMA. RV showing slight reduction in function from baseline post bypass   but improving on epi drip. New bioprosthetic valve in the tricuspid   position that is well seated without paravalvular leaks. Gradient 3. The   pulmonic, tricuspid, and aortic valves are unchanged. The aorta has no new   dissection on ECHO and has a stable thrombus and false lumen from previous   dissection..    Echocardiogram Comments    Cardiac Device Check - Inpatient   Result Value    Date Time Interrogation Session [id number]    Implantable Pulse Generator  Medtronic    Implantable Pulse Generator Model [id number] Micra VR TCP    Implantable Pulse Generator Serial Number [id number]    Type Interrogation Session In Clinic    Clinic Name [facility]    Implantable Pulse Generator Type Pacemaker    Implantable Pulse Generator Implant Date 20230731    Festus Setting Mode (NBG Code) VOO    Festus Setting Lower Rate Limit 80    Festus Setting Hysterisis Rate DISABLED    Lead Channel Setting Sensing Polarity Bipolar    Lead Channel Setting Sensing Anode Location Right Ventricle    Lead Channel Setting Sensing Anode Terminal Ring    Lead Channel Setting Sensing Cathode Location Right Ventricle    Lead Channel Setting Sensing Cathode Terminal Tip    Lead Channel Setting Pacing Polarity Bipolar    Lead Channel Setting Pacing Anode Location Right Ventricle    Lead Channel Setting Pacing Anode Terminal Ring    Lead Channel Setting Sensing Cathode Location Right Ventricle    Lead Channel Setting Sensing Cathode Terminal Tip    Lead Channel Setting Pacing Pulse Width 0.24    Lead Channel Setting Pacing Amplitude 3    Zone Setting Type Category VF    Zone Setting Vendor Type Category V High Rate    Zone Setting Type Category VT    Zone Setting Vendor Type Category FastVT    Zone Setting Type Category VT    Zone Setting Vendor Type Category VT    Zone Setting Type Category VT    Zone Setting Vendor Type Category MonVT    Zone Setting Type Category ATRIAL_FIBRILLATION    Zone Setting Vendor Type Category FastATAF    Zone Setting Type Category AT/AF    Lead Channel Impedance Value 500    Lead Channel Pacing Threshold Amplitude 1.875    Lead Channel Pacing Threshold Pulse Width 0.24    Battery Date Time of Measurements [id number]    Battery Status OK    Battery RRT Trigger 2.5579    Battery Remaining Longevity 50    Battery  Voltage 3.00    Festus Statistic Date Time Start [id number]    Festus Statistic Date Time End [id number]    Festus Statistic RV Percent Paced 99.96    Narrative    Patient seen on [facility]'s 3C for evaluation and iterative programming of   their pacemaker per MD orders. Pre-op TVR.    Device: Medtronic [id number] Micra VR TCP  Normal device function.   Mode: VOO 80 bpm  : 100%  Intrinsic Rhythm:  @ 30 bpm. No ventricular escape rhythm  Short V-V intervals:   Lead Trends Appear Stable.   Estimated battery longevity to RRT = 5.4 years. Battery voltage = 3 V.    Setting Changes: VVIR  bpm to VOO 80 bpm    Plan: Page pacemaker nurse for post-op programming..  ESA Monae RN    Leadless pacemaker    I have reviewed and interpreted the device interrogation, settings,   programming and nurse's summary. The device is functioning within normal   device parameters. I agree with the current findings, assessment and plan.   XR Chest Port 1 View    Narrative    Portable chest    INDICATION: Postoperative CVTS surgery    COMPARISON: 8/1/2023    FINDINGS: Heart upper normal size. Median sternotomy again noted.  Interval placement of large bore right IJ catheter with tip in right  atrium. Endotracheal tube tip approximately 2.2 cm above the keegan.  Lead pacemaker again noted. Subsegmental atelectasis in the left lower  lung zone. Slightly increased interstitial markings in the right lung  suggesting mild edema or atelectasis. Interval tricuspid valve  replacement.      Impression    IMPRESSION: Interval tricuspid valve replacement with mild edema in  the lungs likely related to typical postoperative status. Support  devices as described.    NASEEM RUEDA MD         SYSTEM ID:  [id number]       =========================================

## 2023-11-29 NOTE — DISCHARGE INSTRUCTIONS
AFTER YOU GO HOME FROM YOUR HEART SURGERY  (Right minimally invasive right anterior thoracotomy for tricuspid valve replacement with 31mm [employer] EPIC porcine bioprosthetic valve, PFO closure by Dr. Mabry 11/28/23)    You had a mini-sternotomy, avoid lifting anything greater than ten pounds for 6 weeks after surgery and then less than 20 pounds for an additional 6 weeks.   Do not reach backwards or use arms to push out of chair.   Do not let people pull on your arms to assist with standing.   Avoid twisting or reaching too far across your body.    Avoid strenuous activities such as bowling, vacuuming, raking, shoveling, golf or tennis for 12 weeks after your surgery.   It is okay to resume sex if you feel comfortable in doing so. You may have to try different positions with your partner.    Splint your chest incision by hugging a pillow or bringing your arms across your chest when coughing or sneezing.     No driving for 4 weeks after surgery or while on pain medication.    Shower or wash your incisions daily with soap and water (or as instructed), pat dry.   Keep wound clean and dry, showers are okay after discharge, but don't let spray hit directly on incision.   No baths or swimming for 1 month.   Cover chest tube sites with dry gauze until they stop draining, then leave open to air. It is not abnormal for chest tube sites to drain yellowish/clear fluid for up to 2-3 weeks after surgery.   Watch for signs of infection: increased redness, tenderness, warmth or any drainage that appears infected (pus like) or is persistent.  Also a temperature > 100.5 F or chills. Call your surgeon or primary care provider's office immediately.   Remove any skin glue left on incisions after 10-14 days. This will not affect your incision and can speed up healing.    Exercise is very important in your recovery. Please follow the guidelines set up for you in your cardiac rehab classes at the hospital. If outpatient cardiac  rehab was ordered for you, we highly recommend you participate. If you have problems arranging your cardiac rehab, please call [phone] for all locations, with the exception of [place], please call [phone] and [facility] [place], please call [phone].    Avoid sitting for prolonged periods of time, try to walk every hour during the day. If you have a leg incision, elevate your leg often when you are not walking.    Check your weight when you get home from the hospital and continue to check it daily through your recovery for at least a month. If you notice a weight gain of 2-3 pounds in a week, notify your primary care physician, cardiologist or surgeon.    Bowel activity may be slow after surgery. If necessary, you may take an over the counter laxative such as Milk of Magnesia or Miralax. You may have stool softeners prescribed (docusate sodium, Senokot). We recommend using stool softeners while using narcotics for pain (oxycodone/percocet, hydrocodone/vicodin, hydromorphone/dilaudid).      Wean OFF of narcotics (oxycodone, dilaudid, hydrocodone) as soon as possible. You should continue taking acetaminophen as long as you have any surgical pain as the first choice for pain control and add narcotics as necessary for pain to be tolerable.      DENTAL VISITS AFTER SURGERY  You have had your tricuspid heart valve replaced, we do not recommend having any dental work done for 6 months and you will need to take an antibiotic prior to dental visits from now on.  Please notify your dentist before any procedure for the proper treatment needed. The antibiotic is taken by mouth one hour prior to visit. This includes routine cleanings.    DO NOT SMOKE.  IF YOU NEED HELP QUITTING, PLEASE TALK WITH YOUR CARDIOLOGIST OR PRIMARY DOCTOR.    You are on a blood thinner, follow the instructions you were given in the hospital and DO NOT SKIP this medication. Try and take it the same time everyday. Your primary care physician or  coumadin clinic will manage the dosing. INR goal is 2-3.      REGARDING PRESCRIPTION REFILLS.  If you need a refill on your pain medication contact us to discuss your pain and a possible one time refill.   All other medications will be adjusted, discontinued and re-filled by your primary care physician and/or your cardiologist as they were prior to your surgery. We have given you enough for one to three month with possibly one refill.    POST-OPERATIVE CLINIC VISITS  You will now return to the care of your primary provider and your cardiologist. Future medication refills should come from your PCP or Cardiologist.   You should see your primary care provider about 1 week after discharge from [place].   It is important to see your cardiologist about 4 weeks after discharge from [place].    If you do not hear from a  in 7 days, please call [phone] (choose option 1) and request to be seen with a general cardiologist or someone that you have seen in the past.   If there is a need to return to see CT Surgery please call our  at [phone].    SURGICAL QUESTIONS  Please call Nithin Barry, Mae Dawkins, Kymberly Delarosa, Cathi Millan or Arline Montemayor with surgical recovery and medication questions, their phone numbers are listed below.  They will assist you with your needs and contact other surgery care team members as indicated.    On weekends or after hours, please call [phone] and ask the  to page the Cardiothoracic Surgery fellow on call.      Thank you,    Your Cardiothoracic Surgery Team   Nithin Barry RN Care Coordinator - [phone]  Mae Dawkins RN Care Coordinator - [phone]   Arline Montemayor, RN Care Coordinator - [phone]   Cathi Millan RN Care Coordinator - [phone]  Kymberly Delarosa RN Care Coordinator - [phone]

## 2023-11-29 NOTE — BRIEF OP NOTE
[facility]    Brief Operative Note    Pre-operative diagnosis: Tricuspid valve regurgitation [I07.1]  Patent foramen ovale [Q21.12]  Atrial fibrillation (H) [I48.91]  Post-operative diagnosis Same as pre-operative diagnosis    Procedure: Right thoracotomy on cardiopulmonary bypass. Minimally Invasive Tricuspid Valve replacement using a 31mm Abbott Epic Plus Mitral Valve.  Intraoperative Transesophageal Echocardiogram (ALEX) per Anesthesia., N/A - Chest  patent foramen ovale closure, N/A - Heart    Surgeon: Surgeon(s) and Role:     * Joyce Mabry MD - Primary     * Mary Holloway NP - Assisting     * Helga Fu PA-C - Assisting  Anesthesia: General with Block   Estimated Blood Loss: 500 ml    Drains:  Chest tubes x2 - mediastinal & pleural Heri  Specimens: * No specimens in log *  Findings:   See formal op note for full details  Complications: None  Implants:   Implant Name Type Inv. Item Serial No.  Lot No. LRB No. Used Action   VALVE MITRAL EPIC PLUS TISSUE STENTED 31MM [id number] - [id number] Valve VALVE MITRAL EPIC PLUS TISSUE STENTED 31MM [id number] [employer]  N/A 1 Implanted     Mary Holloway, CNP, ACN-AG  Cardiothoracic Surgery  [employer] Pager [phone]

## 2023-11-29 NOTE — ANESTHESIA CARE TRANSFER NOTE
Patient: Priya Funez    Procedure: Procedure(s):  Right thoracotomy on cardiopulmonary bypass. Minimally Invasive Tricuspid Valve replacement using a 31mm Abbott Epic Plus Mitral Valve.  Intraoperative Transesophageal Echocardiogram (ALEX) per Anesthesia.  patent foramen ovale closure       Diagnosis: Tricuspid valve regurgitation [I07.1]  Patent foramen ovale [Q21.12]  Atrial fibrillation (H) [I48.91]  Diagnosis Additional Information: No value filed.    Anesthesia Type:   General     Note:    Oropharynx: bite block in place and ventilatory support  Level of Consciousness: iatrogenic sedation      Independent Airway: airway patency not satisfactory and stable  Dentition: dentition unchanged  Vital Signs Stable: post-procedure vital signs reviewed and stable  Report to RN Given: handoff report given  Patient transferred to: ICU    ICU Handoff: Call for PAUSE to initiate/utilize ICU HANDOFF, Identified Patient, Identified Responsible Provider, Reviewed the Pertinent Medical History, Discussed Surgical Course, Reviewed Intra-OP Anesthesia Management and Issues during Anesthesia, Set Expectations for Post Procedure Period and Allowed Opportunity for Questions and Acknowledgement of Understanding      Vitals:  Vitals Value Taken Time   BP     Temp 36.5  C (97.7  F) 11/28/23 1929   Pulse 80 11/28/23 1929   Resp 22 11/28/23 1929   SpO2 100 % 11/28/23 1929   Vitals shown include unfiled device data.    Electronically Signed By: Nilson Estrada MD  November 28, 2023  7:30 PM

## 2023-11-30 NOTE — PLAN OF CARE
Patient has been cooperative with staff. Anxious episode this AM, reporting SOB, increased work of breathing. BiPap was placed. Prn dilaudid utilized as indicated. See flowsheets. Refused rectal Tylenol x 2 this AM. Education provided. Up to chair. VSS. 100% paced. CVP 11. Minimual CT output. See flowsheets. Trial time off Bipap on 4L. Excellent pulm hygiene but unable to cough up secretions. BiPap on to time. Lungs coarse. Lasix given as ordered. Awaiting speech eval. Smear BM to time. See flowsheets for UOP. Cont with current POC.

## 2023-11-30 NOTE — PROGRESS NOTES
11/30/23 1400   Appointment Info   Signing Clinician's Name / Credentials (PT) KOBE Mott   Student Supervision Direct Patient Contact Provided   Rehab Comments (PT) O2 needs and low tolerance   Living Environment   People in Home spouse   Current Living Arrangements house   Home Accessibility stairs to enter home;stairs within home   Number of Stairs, Main Entrance 2   Stair Railings, Main Entrance railing on right side (ascending)   Number of Stairs, Within Home, Primary greater than 10 stairs   Stair Railings, Within Home, Primary railing on right side (ascending)   Transportation Anticipated car, drives self;family or friend will provide   Living Environment Comments Lives with spouse in 2-story home. Does go into basement, but all needs including bedroom, bathroom, kitchen, and laundry are on main floor. Still drives, but  can drive her too.   Self-Care   Usual Activity Tolerance good   Current Activity Tolerance fair   Equipment Currently Used at Home walker, rolling   Fall history within last six months no   Activity/Exercise/Self-Care Comment Has four 4WWs in both levels of home, 1 in garage, and 1 in car.   General Information   Onset of Illness/Injury or Date of Surgery 11/28/23   Referring Physician Daisha Padgett APRN CNP   Patient/Family Therapy Goals Statement (PT) to go home   Pertinent History of Current Problem (include personal factors and/or comorbidities that impact the POC) Per chart: Priya Funez is a 83 year old female with PMH of HFpEF, severe TR, AF s/p AVN ablation with PPM (leadless placed 7/31/23), aortic dissection s/p emergent repair 1/2019, HTN, NEDA, CKD and obesity. Presents to [facility] for replacement of tricuspid valve via mini-thoracotomy approach by Dr. Mabry on 11/28/23.   Existing Precautions/Restrictions oxygen therapy device and L/min;thoracotomy   Heart Disease Risk Factors High blood pressure;Overweight;Medical history;Age   Cognition   Affect/Mental  Status (Cognition) WFL   Orientation Status (Cognition) oriented x 4   Follows Commands (Cognition) follows multi-step commands;over 90% accuracy   Cognitive Status Comments Alert and oriented. Follows commands. No memory or  safety deficits noted.   Pain Assessment   Patient Currently in Pain No   Posture    Posture Forward head position;Protracted shoulders;Kyphosis   Range of Motion (ROM)   Range of Motion ROM is WFL   ROM Comment Grossly WFL   Strength (Manual Muscle Testing)   Strength (Manual Muscle Testing) Deficits observed during functional mobility   Strength Comments Grossly deconditioned   Bed Mobility   Comment, (Bed Mobility) impaired per clinical reasoning   Transfers   Comment, (Transfers) sit > stand min-mod A   Gait/Stairs (Locomotion)   Comment, (Gait/Stairs) impaired per clinical reasoning   Balance   Balance Comments static sitting balance good, static standing balance good with UE support, dynamic balance poor   Clinical Impression   Criteria for Skilled Therapeutic Intervention Yes, treatment indicated   PT Diagnosis (PT) impaired functional mobility   Influenced by the following impairments strength, balance, activity tolerance   Functional limitations due to impairments bed mobility, transfers, gait, stairs, functional endurance   Clinical Presentation (PT Evaluation Complexity) evolving   Clinical Presentation Rationale per clinical reasoning   Clinical Decision Making (Complexity) moderate complexity   Planned Therapy Interventions (PT) balance training;bed mobility training;gait training;home exercise program;motor coordination training;neuromuscular re-education;patient/family education;postural re-education;stair training;strengthening;transfer training;progressive activity/exercise;risk factor education;home program guidelines   Risk & Benefits of therapy have been explained evaluation/treatment results reviewed;care plan/treatment goals reviewed;risks/benefits  reviewed;current/potential barriers reviewed;participants voiced agreement with care plan;patient   PT Total Evaluation Time   PT Eval, Low Complexity Minutes (62327) 8   Physical Therapy Goals   PT Frequency 6x/week   PT Predicted Duration/Target Date for Goal Attainment 12/08/23   PT Goals Bed Mobility;Transfers;Gait;Stairs   PT: Bed Mobility Supine to/from sit;Independent   PT: Transfers Sit to/from stand;Independent;Assistive device   PT: Gait Supervision/stand-by assist;Rolling walker;50 feet   PT: Stairs Supervision/stand-by assist;2 stairs;Assistive device;Rail on right   Interventions   Interventions Quick Adds Therapeutic Activity   Therapeutic Activity   Therapeutic Activities: dynamic activities to improve functional performance Minutes (30911) 12   Symptoms Noted During/After Treatment Shortness of breath   Treatment Detail/Skilled Intervention Pt greeted seated in chair with RN present and agreeable to PT. Additional time spent in room for line management. Upon arrival, pt on supplemental oxygen, 3 LPM via NC. After eval, pt stood min A without BUE support - verbal cues for upright posture. O2 sats dropped to ~80% and recovered quickly with rest. Sit <> stand x1 min A to 4WW, static stand for ~1 min with 4WW - pt was more upright and O2 sats stayed >90% with BUE support. Left pt seated in chair, VSS throughout.   PT Discharge Planning   PT Plan standing tolerance, pivot transfers, walk as able   PT Discharge Recommendation (DC Rec) Transitional Care Facility   PT Rationale for DC Rec Pt is below PLOF with impaired mobility. Would benefit from continued therapy to progress toward PLOF and increase indep. Pending progress, may be able to discharge home, will continue to assess.   PT Brief overview of current status sit<>stand min-mod Ax1 with 4WW   Total Session Time   Timed Code Treatment Minutes 12   Total Session Time (sum of timed and untimed services) 20

## 2023-11-30 NOTE — PLAN OF CARE
Goal Outcome Evaluation:      Plan of Care Reviewed With: patient, spouse, family    Overall Patient Progress: improving Overall Patient Progress: improving    Care Management will continue to follow as plan of care progresses and follow for discharge needs.    ZAC Mckeon, SW   [place]   covering [address] Phone: [phone]  [facility]  Social -Work & Care Management Department

## 2023-11-30 NOTE — CONSULTS
Care Management Initial Consult    General Information  Assessment completed with: Patient, Family, Other (Patient's spouse and daughter), Patient's spouse and daughter  Type of CM/SW Visit: Initial Assessment    Primary Care Provider verified and updated as needed: Yes Patient states her PCP is Sharmaine Burks MD at [facility] - [address]  [address]  Readmission within the last 30 days: no previous admission in last 30 days      Reason for Consult: discharge planning  Advance Care Planning: Advance Care Planning Reviewed: other (see comments)  Patient and family plan to complete Health Care Directive during hospitalization.     Communication Assessment  Patient's communication style: spoken language (English or Bilingual)    Hearing Difficulty or Deaf: no   Wear Glasses or Blind: no    Cognitive  Cognitive/Neuro/Behavioral: WDL  Level of Consciousness: alert  Arousal Level: opens eyes spontaneously  Orientation: oriented x 4  Mood/Behavior: (S) agitated, excitable, restless  Best Language: 0 - No aphasia  Speech: clear, spontaneous, logical    Living Environment:   People in home: spouse  Spouse Raphael  Current living Arrangements: house      Able to return to prior arrangements: yes     Family/Social Support: , Children    Care provided by:  Self  Provides care for:  No one   Marital Status:     Raphael Funez   Phone: [phone]  Relation:Spouse     Susie   Phone: [phone]  Relation: Daughter     Description of Support System: Supportive, Involved       Current Resources:   Patient receiving home care services: No     Community Resources: None  Equipment currently used at home: walker, rolling  Supplies currently used at home:  (Walker, Shower Bench)    Employment/Financial:  Employment Status: retired     Employment/ Comments: Customer Service  Financial Concerns: none   Referral to Financial Worker: No     Does the patient's  insurance plan have a 3 day qualifying hospital stay waiver?  No    Lifestyle & Psychosocial Needs:  Social Determinants of Health     Food Insecurity: Low Risk  (10/10/2023)    Food Insecurity     Within the past 12 months, did you worry that your food would run out before you got money to buy more?: No     Within the past 12 months, did the food you bought just not last and you didn t have money to get more?: No   Depression: Not at risk (11/7/2023)    PHQ-2     PHQ-2 Score: 0   Housing Stability: Low Risk  (10/10/2023)    Housing Stability     Do you have housing? : Yes     Are you worried about losing your housing?: No   Tobacco Use: Low Risk  (11/30/2023)    Patient History     Smoking Tobacco Use: Never     Smokeless Tobacco Use: Never     Passive Exposure: Not on file   Financial Resource Strain: Low Risk  (10/10/2023)    Financial Resource Strain     Within the past 12 months, have you or your family members you live with been unable to get utilities (heat, electricity) when it was really needed?: No   Alcohol Use: Not At Risk (1/4/2019)    AUDIT-C     Frequency of Alcohol Consumption: Never     Average Number of Drinks: Not on file     Frequency of Binge Drinking: Not on file   Transportation Needs: Low Risk  (10/10/2023)    Transportation Needs     Within the past 12 months, has lack of transportation kept you from medical appointments, getting your medicines, non-medical meetings or appointments, work, or from getting things that you need?: No   Physical Activity: Not on file   Interpersonal Safety: Not on file   Stress: Not on file   Social Connections: Not on file       Functional Status:  Prior to admission patient needed assistance:   Dependent ADLs:: Independent  Dependent IADLs:: Independent  Assesssment of Functional Status: Not at baseline with ADL Functioning    Mental Health Status:  Mental Health Status: No Current Concerns       Chemical Dependency Status:  Chemical Dependency Status: No  "Current Concerns           Values/Beliefs:  Spiritual, Cultural Beliefs, Congregational Practices, Values that affect care: no             Additional Information:  Per H&P \" Priya Funez is a 83 year old female with PMH of HFpEF, severe TR, AF s/p AVN ablation with PPM (leadless placed 7/31/23), aortic dissection s/p emergent repair 1/2019, HTN, NEDA, CKD and obesity. Presents to [facility] for replacement of tricuspid valve via mini-thoracotomy approach by Dr. Mabry on 11/28/23.\"    Care Management Initial Assessment completed due to high risk readmission score.     Social work met with patient, patient's spouse, and patient's daughter to introduce self and explain role. Care Management will follow for any discharge needs.    SW met with patient at bedside to discuss advanced care planning. SW discussed the purpose and advantages to having a advanced care directive and provided patient with the paperwork to complete one. SW answered questions and addressed concerns. Writer explained the form and what was necessary to make it legal. Writer offered to arrange to have the HCD notarized by a shaun.       ZAC Mckeon, LGSW   [place]   covering on [address] Phone: [phone]  [facility]  Social Work & Care Management Department  "

## 2023-11-30 NOTE — PLAN OF CARE
Major Shift Events: No acute events overnight. AO x4. Paced at 80 bpm, VVI. CVPs 8-10 overnight. Epi remains on at 0.03 mcg/kg/min to maintain MAP >65. Tolerating 2L NC overnight, gases stable. Patient having difficulty cough secretions up. Much more coarse lung sounds following dinner. MD aware; oral intake restricted, plan for speech consult in AM. Barrientos remains in place; 25-50 mL/hr. No lasix overnight given CVPs and UO.     Plan: Collaborate with pain management team to remove paravertebral catheter between first and second dose of warfarin. Speech consult. Monitor gases and respiratory status. Wean Epi as able.    For vital signs and complete assessments, please see documentation flowsheets.     Goal Outcome Evaluation:      Plan of Care Reviewed With: patient    Overall Patient Progress: improvingOverall Patient Progress: improving    Outcome Evaluation: On 2L NC overnight. Gases stable. Difficulties getting secretions up and out, MD aware. Plan for speech consult in AM.

## 2023-11-30 NOTE — PROGRESS NOTES
CV ICU PROGRESS NOTE  November 30, 2023      CO-MORBIDITIES:   S/P TVR (tricuspid valve replacement) and PFO closure (primary encounter diagnosis)  Patient Active Problem List   Diagnosis    SOB (shortness of breath)    Chronic diastolic heart failure (H)    Chronic atrial fibrillation (H)    Benign essential hypertension    Chronic kidney disease, stage III (moderate) (H)    Chronic right-sided heart failure (H)    Severe tricuspid regurgitation    History of aortic dissection    NEDA (generalized anxiety disorder)    History of blood transfusion    Cardiac pacemaker in situ    Complete heart block (H)    Atrial pacemaker lead displacement    Displacement of atrial pacemaker leads, initial encounter    Thrombocytopenia (H24)    Chronic diastolic congestive heart failure (H)    C. difficile colitis    Current use of long term anticoagulation    Tricuspid regurgitation        ASSESSMENT: Lavonne Funez is a 83 year old female with PMH of HFpEF, severe TR, permanent AF s/p AVN ablation with PPM implant 1/11/19 s/p extraction TV (transvenous) PPM and implant leadless PPM 7/31/23, emergent aortic dissection repair 1/4/19, HTN, CKD, NEDA, and obesity who underwent right minithoracotomy tricuspid valve replacement with 31mm [employer] EPIC porcine bioprosthetic valve & PFO closure on 11/28/23 by Dr. Mabry.     Last 24 hours:  - Started ASA per CVST  - Device Nurse Adjusted PPM, will need to return prior to discharge to restore settings if tolerated    - Weaned off BiPAP yesterday afternoon to 2L NC    - Remained NPO, will need formal SLP consult per bedside RN  - Lasix 60 mg IV x1 with good UPO (Net negative 600 7a-7a)   - Saturations maintained on 2L NC overnight  - Weaned off NE   - Bedside echo nonconcerning   - Lactic acid normalized   - ABG's improved off BiPAP   - Arterial line stopped working this morning     Today's Progress:  - Replace vs place new A-line   -- Back on  BiPAP following desaturation event this  "morning, possible aspiration  - Stat CXR: \"Continued elevation right hemidiaphragm with slight increased edema or atelectasis at the right lung base. No definitive pneumothorax.\"  - Started Zosyn, given possible aspiration event   - DUONEB Q4H  - CPT with RT   - Trial off BiPAP later today   - NPO until off BiPAP, then will need formal SLP consult  - ABG/VBG for mixed venous, CVP, lactate Q4H  - Wean off Epi, as tolerated   - Lasix 60 mg IV bolus x1 now  - AST downtrending, continue to follow and hold statin   - Last BM PTA, will follow up this afternoon after PRNs given     PLAN:  Neuro/ pain/ sedation:  # Risk for delirium  Currently A&Ox3   - Monitor neurological status.   - Notify the MD for any acute changes in exam.  #Acute postoperative pain  - Scheduled: tylenol   - PRN: tylenol, oxycodone, dilaudid, robaxin  - Paravetebral catheter in place with ropivacaine, will discuss duration and removal pending initiation of warfarin.   # Hx NEDA  - PTA lexapro/Escitalopram   - PTA remeron/mirtazapine   - Will restart if possible after SLP evaluation   #Sedation, resolved  Off all sedation, extubated 11/28     Pulmonary care:   # Acute hypercapnic and hypoxic respiratory failure, requiring BiPAP  # S/P Mechanical ventilation, extubated 11/28  # History of tracheostomy (2019)  # Possible aspiration event   - Goal SpO2 > 92%  - Encourage IS q15-30 minutes when awake.  - Patient was on 2L NC saturating >94% this morning, with coarse breath sounds bilaterally and subsequently had a desaturation event requiring BiPAP   - Continue BiPAP following desaturation event this morning  - Stat CXR \"Continued elevation right hemidiaphragm with slight increased edema or atelectasis at the right lung base. No definitive pneumothorax.\"  - DUONEB Q4H scheduled   - Possible trial off BiPAP this afternoon if continuing to improve   - Follow ABG and Mixed venous Q4H once a-line replaced   - CPT with RT     Cardiovascular:    # S/p TV " replacement 11/28/2023  # Cardiogenic shock, requiring epi, improving   # Vasoplegic shock, resolved   # PPM, upgraded to leadless 7/2023  # Hx HFpEF  # Hx Afib s/p AVN ablation  # Hx HTN  # Hx HLD  # Hx Aortic dissection s/p emergent repair 2019  - Device nurse consulted for PPM: currently VVI @ 80. Will need device nurse to return prior to discharge to restore her PTA settings with rate around 60, as tolerated.   - Monitor hemodynamic status.   - Goal MAP >65, SBP <140  - Epi gtt for inotropic support, will wean off as tolerated today   - Hold Statin   - Hold BB  - Hold PTA jardiance, spironalactone, torsemide  - Continue rectal ASA while NPO   - CVP checks Q4H   - ABG/VBG w/ mixed venous Q4H & PRN   - Arterial line stopped working this morning, plan to re-wire or replace     GI care / Nutrition:   # Elevated AST, improving   # At risk for protein malnutrition  # Hx diverticulosis  - NPO, until off BiPAP >2 hours, will need a formal SLP once off BiPAP  - Nutrition consulted, appreciate recommendations   - PTA AST was 27, downtrending   - Holding statin until off pressors and AST normalizes  - Continue to follow LFTs w/ daily CMP   - PPI for GI prophylaxis  - Bowel regimen: MiraLAX, senna, PRN Milk of mag and suppository   - Last BM PTA, will increase bowel medications if no BM with PRNs today.      Renal / Fluids / Electrolytes:   # Volume status   # Lactic acidosis, resolved  # Electrolyte derangement  # Hx of CKD  BL creat appears to be ~ 1.2-1.3  - Strict I/O, daily weights  - Bedside echo performed 11/29   - Avoid/limit nephrotoxins as able  - Replete lytes PRN per protocol  - Peak lactate of 4.4 11/28 @ 2147, normalized s/p fluid boluses  - Trend lactate PRN   - Give Lasix 60 mg IV now   - Follow up UPO, CVP, Lactate, and pressor requirements   - Will consider additional diuresis this afternoon   - FBG dependent on CVP, pressor requirement, CO/CI, and lactate trends      Endocrine:    # Stress  hyperglycemia  Preop A1c 5.7  - Discontinue insulin gtt, currently NPO, will reassess once tolerating po intake.   - Goal BG <180 for optimal healing     ID / Antibiotics:  # Stress induced leukocytosis  # Possible aspiration event   # Risk of aspiration pneumonia vs pneumonitis  # Hx of C. Diff  - Finished perioperative regimen  - T max 100.4 F around 1400 hours 11/29, downtrending since per RN   - Continue to monitor fever curve, WBC, and inflammatory markers as appropriate  - Started empirical Zosyn following desaturation event and possible aspiration this morning   - Sputum cx   - MRSA       Heme:     # Acute blood loss anemia  No s/sx active bleeding  - Continue to monitor  - CBC on arrival and daily  - Hgb goal > 7.0  - Two chest tubes in place, one MS and one Pleural, no airleak      MSK / Skin:  # s/p Thoracotomy  # Rib fracture s/p retraction   # Surgical Incision  - Right Thoracotomy precautions  - Postoperative incision management per protocol  - Multimodal pain management as above   - PT/OT/CR     Prophylaxis:     - Mechanical ppx for DVT  - Chemical DVT ppx: SQH   - PPI  - Bowel regimen: MiraLAX, senna, PRN milk of mag and suppository     Lines / Tubes / Drains:  - RIJ CVC  - L Arterial Line  - CTs x2  - Barrientos     Disposition:  - CVICU      ICU Checklist  F - Feeding: NPO while on BiPAP, then will need formal SLP   A - Analgesia: multi-modal with paravertebral cath, scheduled and PRN oral medications  S - Sedation: Off  T - Thromboembolic prophylaxis: SCDs, SQH      H - Head of bed elevated: yes   U - Ulcer prophylaxis: PPI  G - Glycemic control: Discontinued insulin gtt  S - SBT: Trial off BiPAP today      B - Bowel regimen: Miralax, Senna, PRN milk of mag and suppository  I - Indwelling catheter: Barrientos   D - De-escalation of antibiotics: Started Zosyn     Patient seen, findings and plan discussed with CVICU staff    Quin Langford  Anesthesiology  "CA-1/PGY-2      ====================================    SUBJECTIVE:   She states she is doing okay. States she feels like she has a \"lot to get out\" and \"needs to cough\" but its not coming up. She also complains of increased pain today, requesting PRN pain medications.     OBJECTIVE:   1. VITAL SIGNS:   Temp:  [97.7  F (36.5  C)-100.4  F (38  C)] 99.3  F (37.4  C)  Pulse:  [80-91] 80  Resp:  [12-30] 27  BP: ()/(44-68) 104/66  MAP:  [60 mmHg-288 mmHg] 96 mmHg  Arterial Line BP: ()/(45-81) 102/65  FiO2 (%):  [50 %] 50 %  SpO2:  [67 %-100 %] 98 %  Vent Mode: CPAP/PS  (Continuous positive airway pressure with Pressure Support)  FiO2 (%): 50 %  Resp: 27      2. INTAKE/ OUTPUT:   I/O last 3 completed shifts:  In: 949.76 [I.V.:949.76]  Out: 1555 [Urine:1195; Chest Tube:360]    3. PHYSICAL EXAMINATION:   General: NAD, HOB elevated lying supine.   Neuro: A&Ox3. Moves all extremities purposefully.  Follows commands. Answers questions appropriately.   Resp: On 2L NC sating >92%, RR 20. Coarse breath sounds bilaterally R>L. No wheezing appreciated. Later this afternoon s/p desaturation event on BiPAP, continued coarse breath sounds bilaterally.   CV: Paced at 80. Extremities well perfused.   Abdomen: Soft, Non-distended, Non-tender  Incisions: c/d/I, CTx2, no air leaks, draining serosanguinous fluid.   Extremities: warm, well perfused, no pitting edema appreciated.     4. INVESTIGATIONS:   Arterial Blood Gases   Recent Labs   Lab 11/30/23  0339 11/29/23  2352 11/29/23  1947 11/29/23  1511   PH 7.35 7.35 7.37 7.37   PCO2 47* 48* 44 47*   PO2 85 80 69* 93   HCO3 26 26 26 27     Complete Blood Count   Recent Labs   Lab 11/30/23  0339 11/29/23  0342 11/28/23  2005 11/28/23  1818   WBC 18.3* 27.3* 23.8* 18.0*   HGB 10.5* 12.1 12.8 9.7*   * 165 215 195     Basic Metabolic Panel  Recent Labs   Lab 11/30/23  0812 11/30/23  0343 11/30/23  0339 11/29/23  2358 11/29/23  1515 11/29/23  1510 11/29/23  0543 11/29/23  0342 " 11/28/23 2015 11/28/23 2005   NA  --   --  142  --   --  143  --  139  --  140   POTASSIUM  --   --  4.1  --   --  3.9  --  4.4  4.4  --  3.3*   CHLORIDE  --   --  106  --   --  106  --  104  --  100   CO2  --   --  22  --   --  24  --  21*  --  20*   BUN  --   --  34.4*  --   --  31.6*  --  28.0*  --  25.0*   CR  --   --  1.42*  --   --  1.44*  --  1.30*  --  1.11*   * 128* 144* 128*   < > 94   < > 154*   < > 214*    < > = values in this interval not displayed.     Liver Function Tests  Recent Labs   Lab 11/30/23  0339 11/29/23  1510 11/29/23 0342 11/28/23 2005 11/28/23 1818   AST 57* 69* 86*  --   --    ALT 18 23 23 23  --    ALKPHOS 84 80 81 87  --    BILITOTAL 0.7 0.9 1.2 1.7*  --    ALBUMIN 3.3* 3.5 3.5 3.5  --    INR  --   --   --  1.44* 1.68*     Pancreatic Enzymes  No lab results found in last 7 days.  Coagulation Profile  Recent Labs   Lab 11/28/23 2005 11/28/23 1818   INR 1.44* 1.68*   PTT 34 30         5. RADIOLOGY:   Recent Results (from the past 24 hour(s))   Cardiac Device Check - Inpatient   Result Value    Date Time Interrogation Session [id number]    Implantable Pulse Generator  Medtronic    Implantable Pulse Generator Model [id number] Micra VR TCP    Implantable Pulse Generator Serial Number [id number]    Type Interrogation Session In Clinic    Clinic Name [facility]    Implantable Pulse Generator Type Pacemaker    Implantable Pulse Generator Implant Date [id number]    Festus Setting Mode (NBG Code) VVIR    Festus Setting Lower Rate Limit 80    Festus Setting Maximum Sensor Rate 120    Festus Setting Hysterisis Rate DISABLED    Lead Channel Setting Sensing Polarity Bipolar    Lead Channel Setting Sensing Anode Location Right Ventricle    Lead Channel Setting Sensing Anode Terminal Ring    Lead Channel Setting Sensing Cathode Location Right Ventricle    Lead Channel Setting Sensing Cathode Terminal Tip    Lead Channel Setting Sensing Sensitivity 2     Lead Channel Setting Pacing Polarity Bipolar    Lead Channel Setting Pacing Anode Location Right Ventricle    Lead Channel Setting Pacing Anode Terminal Ring    Lead Channel Setting Sensing Cathode Location Right Ventricle    Lead Channel Setting Sensing Cathode Terminal Tip    Lead Channel Setting Pacing Pulse Width 0.24    Lead Channel Setting Pacing Amplitude 3    Lead Channel Setting Pacing Capture Mode Adaptive    Zone Setting Type Category VF    Zone Setting Vendor Type Category V High Rate    Zone Setting Type Category VT    Zone Setting Vendor Type Category FastVT    Zone Setting Type Category VT    Zone Setting Vendor Type Category VT    Zone Setting Type Category VT    Zone Setting Vendor Type Category MonVT    Zone Setting Type Category ATRIAL_FIBRILLATION    Zone Setting Vendor Type Category FastATAF    Zone Setting Type Category AT/AF    Zone Setting Type Category BRADYCARDIA    Lead Channel Impedance Value 470    Lead Channel Sensing Intrinsic Amplitude 14.963    Lead Channel Pacing Threshold Amplitude 1.63    Lead Channel Pacing Threshold Pulse Width 0.24    Battery Date Time of Measurements [id number]    Battery Status OK    Battery RRT Trigger 2.5579    Battery Remaining Longevity 54    Battery Voltage 2.99    Festus Statistic Date Time Start [id number]    Festus Statistic Date Time End [id number]    Festus Statistic RV Percent Paced 100    Narrative    Patient seen on 4A for evaluation and iterative programming of her Medtronic [id number] Micra VR TCP leadless pacemaker per MD orders.    Device: Medtronic [id number] Micra VR TCP  Normal Device Function  Mode: VOO 80 bpm  : 100%  Intrinsic Rhythm: ventricular rate ~ 46 bpm  Electrode impedance trend appears stable: yes  Estimated battery longevity to RRT = 4.6 years  Setting Changes: Pacing mode programmed from VOO to VVIR. When LRL decreased to 60 bpm patient's MAP dropped  requiring increase in pressors. LRL programmed back to 80 bpm per  KRISTI Medeiros for CVTS orders.     Plan: Device follow-up every 3 months.  EMIL Maldonado RN    Leadless pacemaker    I have reviewed and interpreted the device interrogation, settings, programming and nurse's summary. The device is functioning within normal device parameters. I agree with the current findings, assessment and plan.   XR Chest Port 1 View    Narrative    Exam: XR CHEST PORT 1 VIEW, 11/30/2023 5:45 AM    Indication: Daily CXR in ICU    Comparison: 11/29/2023    Findings:   Portable frontal view of the chest. Postsurgical changes of the chest  with intact median sternotomy wires. Right IJ sheath in the high SVC.  Stable tricuspid valve prosthesis, leadless pacemaker, right-sided  chest tubes.    Stable mediastinal silhouette. Small pleural effusions, similar to  prior. No appreciable pneumothorax. Low lung volumes with persistent  bibasilar opacities. No new airspace opacity.      Impression    Impression:   1. Stable support devices.  2. No significant change in small pleural effusions and bibasilar  atelectasis/edema.    I have personally reviewed the examination and initial interpretation  and I agree with the findings.    GLADYS DENNIS MD         SYSTEM ID:  [id number]   XR Chest Port 1 View    Narrative    Portable chest 11/30/2023 at 0804 hours    INDICATION: Hypoxia    COMPARISON: 11/30/2023 at 0543 hours    Findings: Heart is mildly enlarged. Lead left pacemaker again noted.  Median sternotomy again present. Large bore right IJ approach catheter  tip in the SVC. Right thoracostomy tube again present. No definitive  pneumothorax. Haziness in the right lower lung field is similar to  minimally increased. Right hemidiaphragm remains mildly elevated.      Impression    IMPRESSION: Continued elevation right hemidiaphragm with slight  increased edema or atelectasis at the right lung base. No definitive  pneumothorax.    NASEEM RUEDA MD         SYSTEM ID:  [id number]        =========================================

## 2023-11-30 NOTE — PROGRESS NOTES
"Pain Service Progress Note  [facility]  Date: 11/30/2023       Patient Name: Priya Funez  MRN: [id number]  Age: 83 year old  Sex: female      Assessment:  Priya Funez is a 83 year old female who has PMH of  HFpEF, severe TR, AF s/p AVN ablation with PPM (leadless placed 7/31/23), aortic dissection s/p emergent repair on Jan. 2019, HTN, NEDA, CKD. Underwent replacement of tricuspid valve via mini-thoracotomy approach by Dr. Mabry on 11/28/23.      Procedure:  replacement of tricuspid valve via mini-thoracotomy     Date of Surgery: 11/28/23     Date of Catheter Placement: 11/28/23     Plan/Recommendations:  1. Regional Anesthesia/Analgesia  -Continuous Catheter Type/Site: right paravertebral (PV) T4-5  Infusate: 0.2% Ropivacane     Programmed Intermittent Bolus (PIB) at 7 mL Q60 min via each catheter, total infusion rate of 7 mL/hr     Plan to maintain catheter, max of 7 days     2. Anticoagulation  -Please contact Inpatient Pain Service before ordering or making any anticoagulation changes        3. Multimodal Analgesia  - per primary service     Pain Service will continue to follow.     Discussed with attending anesthesiologist    Cindy Mooney PA-C  11/30/2023     Overnight Events: No major acute event    Tubes/Drains: Yes  Chest tubes    Subjective:  She states pain at chest is \"not bad\" after transitioning from bed to recliner.  Is bothered by bipap mask.    Symptoms of LAST: No    Pain Location:  chest    Pain Intensity:    Pain at Rest: 0/10   Pain with activities: 5/10    Diet: Low Saturated Fat Na <2400 mg    Relevant Labs:  Recent Labs   Lab Test 11/30/23  0339 11/29/23  0342 11/28/23 2005   INR  --   --  1.44*   *   < > 215   PTT  --   --  34   BUN 34.4*   < > 25.0*    < > = values in this interval not displayed.       Physical Exam:  Vitals: /60   Pulse 80   Temp 99.3  F (37.4  C)   Resp 24   Ht 1.613 m (5' 3.5\")   Wt 72.8 kg (160 lb 7.9 oz)   " SpO2 100%   BMI 27.98 kg/m      Physical Exam:   Orientation:  Alert, oriented, and in no acute distress: Yes  Sedation: No    Motor Examination:  5/5 Strength in lower extremities: Yes      Catheter Site:   Catheter entry site is clean/dry/intact: Yes    Tender: No      Relevant Medications:  Current Pain Medications:  Medications related to Pain Management (From now, onward)      Start     Dose/Rate Route Frequency Ordered Stop    12/01/23 0000  acetaminophen (TYLENOL) tablet 650 mg         650 mg Oral EVERY 4 HOURS PRN 11/28/23 1921      11/30/23 1000  acetaminophen (TYLENOL) tablet 650 mg        See Hyperspace for full Linked Orders Report.    650 mg Oral EVERY 4 HOURS 11/30/23 0943      11/30/23 1000  acetaminophen (TYLENOL) Suppository 650 mg        See Hyperspace for full Linked Orders Report.    650 mg Rectal EVERY 4 HOURS 11/30/23 0943      11/30/23 0940  HYDROmorphone (DILAUDID) injection 0.2 mg        See Hyperspace for full Linked Orders Report.    0.2 mg Intravenous EVERY 2 HOURS PRN 11/30/23 0943      11/30/23 0940  HYDROmorphone (DILAUDID) injection 0.4 mg        See Hyperspace for full Linked Orders Report.    0.4 mg Intravenous EVERY 2 HOURS PRN 11/30/23 0943      11/29/23 1100  aspirin (ASA) Suppository 300 mg         300 mg Rectal DAILY 11/29/23 1046      11/29/23 0800  polyethylene glycol (MIRALAX) Packet 17 g         17 g Oral DAILY 11/28/23 1921 11/28/23 2000  senna-docusate (SENOKOT-S/PERICOLACE) 8.6-50 MG per tablet 1 tablet         1 tablet Oral 2 TIMES DAILY 11/28/23 1921 11/28/23 1921  methocarbamol (ROBAXIN) tablet 500 mg         500 mg Oral EVERY 8 HOURS PRN 11/28/23 1921 11/28/23 1921  magnesium hydroxide (MILK OF MAGNESIA) suspension 30 mL         30 mL Oral DAILY PRN 11/28/23 1921 11/28/23 1921  bisacodyl (DULCOLAX) suppository 10 mg         10 mg Rectal DAILY PRN 11/28/23 1921 11/28/23 1921  oxyCODONE IR (ROXICODONE) half-tab 2.5 mg        See Hyperspace for  "full Linked Orders Report.    2.5 mg Oral EVERY 4 HOURS PRN 11/28/23 1921      11/28/23 1921  oxyCODONE (ROXICODONE) tablet 5 mg        See Hyperspace for full Linked Orders Report.    5 mg Oral EVERY 4 HOURS PRN 11/28/23 1921      11/28/23 1921  lidocaine 1 % 0.1-1 mL         0.1-1 mL Other EVERY 1 HOUR PRN 11/28/23 1921 11/28/23 1921  lidocaine (LMX4) cream          Topical EVERY 1 HOUR PRN 11/28/23 1921 11/28/23 1300  ROPivacaine 0.2% in NS perineural infusion (simple)          Perineural Continuous Nerve Block 11/28/23 1249              Primary Service Contacted with Recommendations? Yes            Acute Inpatient Pain Service [facility]  Hours of pain coverage 24/7   Page via Amcom- Please Page the Pain Team Via Amcom: \"PAIN MANAGEMENT ACUTE INPATIENT/ [facility]\"            "

## 2023-11-30 NOTE — PLAN OF CARE
Major Shift Events:    No acute changes this shift. Patient denies pain, ES catheters in place, dressing intact. Completely paced, pressures stable, Tmax 99.1, supp. Tylenol given. BiPap on, coello in place with adequate UOP    Plan: Continue with POC, encourage pulmonary hygiene when off Bipap    For vital signs and complete assessments, please see documentation flowsheets.

## 2023-12-01 NOTE — PROGRESS NOTES
Overnight patient with productive cough and increased work of breathing, Alternating on Bipap and HFNC with good results. VBG's stable.   Otherwise no acute events. Vitals stable, patient pain under control.

## 2023-12-01 NOTE — PROGRESS NOTES
Care Management Follow Up     CHW/Briseyda was asked to meet with patient to have them sign and notarize their Health Care Directive. CHW met with patient to notarize HCD.HCD form was verbally reviewed and verified with patient by CHW Pt signed and dated form and CHW notarized form. HCD was emailed to [employer] and a copy was placed in the pt's chart. The original HCD was given to the pt.      Dennis Hunter   [facility] Community Health Worker and Briseyda   [facility] 7C

## 2023-12-01 NOTE — PROGRESS NOTES
Care Management Follow Up    Length of Stay (days): 3    Expected Discharge Date: 12/6/2023     Concerns to be Addressed: Discharge planning. Notarize Health Care Directive.   Patient plan of care discussed at interdisciplinary rounds: Yes    Anticipated Discharge Disposition:  Transitional Care Unit.      Anticipated Discharge Services:  Transitional Care Placement.  Anticipated Discharge DME:  Continuing to assess.     Patient/family educated on Medicare website which has current facility and service quality ratings:  Yes  Education Provided on the Discharge Plan:  Yes  Patient/Family in Agreement with the Plan:  Yes.     Referrals Placed by CM/SW:  None currently.   Private pay costs discussed: Not applicable    Additional Information: PT and OT recommending Transitional Care Placement. SW provided patient with printed list of TCU facilities from the medcare.gov website to consider. Patient is in agreement with plan for discharge to a TCU. Patient identified preferences listed below in order of preference. Social work will continue to follow and will send referrals closer to medical readiness.     -  [facility] Main Phone Number: [phone]    -  [facility] Admissions Phone [phone]     - [facility] Phone: [phone], Admissions: [phone]    - [facility]. Phone: [phone]    - [facility]  Phone: [phone]    Patient also asked for a notary to notarize the Health Care Directive. Social work arranged for Community Health Worker (CHW) notarize the HCD this afternoon.     ZAC Mckeon, [facility]   [place]   covering 4A Phone: [phone]  [facility]  Social Work & Care Management Department

## 2023-12-01 NOTE — PROGRESS NOTES
Patient having junky, productive cough on BIPAP. Switched to HFNC to allow patient to be able to cough out secretions and better humidify as well. Patient tolerated well. VBG after 30 minutes was appropriate. Will leave on as tolerated.     Patient's non invasive settings changed to BIPAP 10/5. Patient clinically doing much better. Tolerated well, repeat gas appropriate.

## 2023-12-01 NOTE — PROGRESS NOTES
12/01/23 1028   Appointment Info   Signing Clinician's Name / Credentials (SLP) Ana Rivera MS CCC-SLP   General Information   Onset of Illness/Injury or Date of Surgery 11/28/23   Referring Physician Quin Langford MD   Patient/Family Therapy Goal Statement (SLP) None stated   Pertinent History of Current Problem Lavonne Funez is a 83 year old female with PMH of HFpEF, severe TR, permanent AF s/p AVN ablation with PPM implant 1/11/19 s/p extraction TV (transvenous) PPM and implant leadless PPM 7/31/23, emergent aortic dissection repair 1/4/19, HTN, CKD, NEDA, and obesity who underwent right minithoracotomy tricuspid valve replacement with 31mm [employer] Mert EPIC porcine bioprosthetic valve & PFO closure on 11/28/23 by Dr. Mabry. Pt extubated 11/30. Clinical swallow eval completed per MD orders to further assess oropharyngeal swallow function.   Type of Evaluation   Type of Evaluation Swallow Evaluation   Oral Motor   Oral Musculature generally intact   Structural Abnormalities none present   Mucosal Quality adequate   Dentition (Oral Motor)   Dentition (Oral Motor) adequate dentition   Facial Symmetry (Oral Motor)   Facial Symmetry (Oral Motor) WNL   Lip Function (Oral Motor)   Lip Range of Motion (Oral Motor) WNL   Tongue Function (Oral Motor)   Tongue ROM (Oral Motor) WNL   Jaw Function (Oral Motor)   Jaw Function (Oral Motor) WNL   Cough/Swallow/Gag Reflex (Oral Motor)   Soft Palate/Velum (Oral Motor) WNL   Volitional Throat Clear/Cough (Oral Motor) reduced strength   Vocal Quality/Secretion Management (Oral Motor)   Vocal Quality (Oral Motor) WFL   Secretion Management (Oral Motor) WNL   General Swallowing Observations   Past History of Dysphagia Pt familiar to SLP caseload during admission to [facility] in 2019. At that time, pt complete VFSS which demonstrated penetration with thin liquids and aspiration w/cough of mildly thick liquids. A regular diet and honey thick liquids was recommended at that  time. Since then, pt has not followed with SLP and self-advanced to thin liquids. Pt denied any recent trouble swallowing and reported no hx of PNA.   Respiratory Support high-flow  (25LPM with 40% FiO2)   Current Diet/Method of Nutritional Intake (General Swallowing Observations, NIS) NPO   Swallowing Evaluation Clinical swallow evaluation   Clinical Swallow Evaluation   Feeding Assistance no assistance needed   Clinical Swallow Evaluation Textures Trialed thin liquids;pureed;solid foods   Clinical Swallow Eval: Thin Liquid Texture Trial   Mode of Presentation, Thin Liquids straw;self-fed;cup;spoon   Volume of Liquid or Food Presented 8 oz   Oral Phase of Swallow WFL   Pharyngeal Phase of Swallow intact   Diagnostic Statement No overt s/sx of aspiration   Clinical Swallow Evaluation: Puree Solid Texture Trial   Mode of Presentation, Puree spoon;self-fed   Volume of Puree Presented 3 tbsp   Oral Phase, Puree WFL   Pharyngeal Phase, Puree intact   Diagnostic Statement No overt s/sx of aspiration   Clinical Swallow Evaluation: Solid Food Texture Trial   Mode of Presentation self-fed   Volume Presented 3 tbsp   Oral Phase   (prolonged but functional time for mastication)   Pharyngeal Phase intact   Diagnostic Statement No overt s/sx of aspiration. Pr required prolonged but functional time for mastication   Esophageal Phase of Swallow   Patient reports or presents with symptoms of esophageal dysphagia No   Swallowing Recommendations   Diet Consistency Recommendations thin liquids (level 0);regular diet   Supervision Level for Intake distant supervision needed   Mode of Delivery Recommendations bolus size, small;food moistened;slow rate of intake   Swallowing Maneuver Recommendations alternate food and liquid intake;extra swallow   Monitoring/Assistance Required (Eating/Swallowing) monitor for cough or change in vocal quality with intake;stop eating activities when fatigue is present   Recommended Feeding/Eating  Techniques (Swallow Eval) maintain upright sitting position for eating;maintain upright posture during/after eating for 30 minutes;minimize distractions during oral intake;set-up and prepare tray   Medication Administration Recommendations, Swallowing (SLP) as tolerated   Instrumental Assessment Recommendations   (may benefit from repeat VFSS, however pt currently declining)   General Therapy Interventions   Planned Therapy Interventions Dysphagia Treatment   Dysphagia treatment Instruction of safe swallow strategies;Compensatory strategies for swallowing   Clinical Impression   Criteria for Skilled Therapeutic Interventions Met (SLP Eval) Yes, treatment indicated   SLP Diagnosis mild oropharyngeal dysphagia   Risks & Benefits of therapy have been explained evaluation/treatment results reviewed;care plan/treatment goals reviewed;risks/benefits reviewed;current/potential barriers reviewed;participants voiced agreement with care plan;participants included;patient;spouse/significant other   Clinical Impression Comments Clinical swallow eval completed per MD orders. Pt presents with mild acute on chronic oropharyngeal dysphagia. Pt requiring HFNC (25LPM with 40% FiO2). Pt with weak cough, otherwise oral mech exam unremarkable. Pt tolerated thin liquids, pureed, and regular solid textures with no overt s/sx of aspiration. Pt required prolonged but functional time for mastication. Recommend regular textures and thin liquids. Pt should be fully upright and alert for all PO, take small sips/bites, slow pacing, and alternate between consistencies. Pt with remote hx of dysphagia and aspiration and would likely benefit from repeat VFSS, however pt politely declining at this time. ST to continue to follow. Pt may meet swallowing goals prior to discharge.   SLP Discharge Recommendation home with assist   SLP Rationale for DC Rec pt may meet swallowing goals prior to discharge   SLP Brief overview of current status  Recommend  regular textures and thin liquids. Pt should be fully upright and alert for all PO, take small sips/bites, slow pacing, and alternate between consistencies. Pt with remote hx of dysphagia and aspiration and would likely benefit from repeat VFSS, however pt politely declining at this time.   Total Session Time   Total Session Time (sum of timed and untimed services) 20

## 2023-12-01 NOTE — PROGRESS NOTES
Pain Service Progress Note  [facility]  Date: 12/01/2023       Patient Name: Priya Funez  MRN: [id number]  Age: 83 year old  Sex: female      Assessment:  Priya Funez is a 83 year old female who has PMH of  HFpEF, severe TR, AF s/p AVN ablation with PPM (leadless placed 7/31/23), aortic dissection s/p emergent repair on Jan. 2019, HTN, NEDA, CKD. Underwent replacement of tricuspid valve via mini-thoracotomy approach by Dr. Mabry on 11/28/23.    Interval Events:  POD#3. No acute overnight events. Patient now on HFNC with productive cough, undergoing breathing treatments. Patient denies any pain, has two chest tubes in place. No change to pain plan at this time to promote comfort while patient respiratory status stabilizes.    Procedure:  replacement of tricuspid valve via mini-thoracotomy    Date of Surgery: 11/28/23    Date of Catheter Placement: 11/28/23    Plan/Recommendations:  1. Regional Anesthesia/Analgesia  -Continuous Catheter Type/Site: right paravertebral (PV) T4-5  Infusate: 0.2% Ropivacane     Programmed Intermittent Bolus (PIB) at 7 mL Q60 min via each catheter, total infusion rate of 7 mL/hr     Plan to maintain catheter, max of 7 days     2. Anticoagulation  - Please contact Inpatient Pain Service before ordering or making any anticoagulation changes        3. Multimodal Analgesia  - per primary service     Pain Service will continue to follow.     Discussed with attending anesthesiologist    Anayeli Welch MD  Anesthesiology Resident, CA-1  12/01/2023     Overnight Events: No acute overnight events    Tubes/Drains: Yes  Chest tubes x2    Subjective:  no pain    Symptoms of LAST: No    Pain Location:  chest    Pain Intensity:    Pain at Rest: 0/10   Pain with activities: 5/10    Diet: Low Saturated Fat Na <2400 mg    Relevant Labs:  Recent Labs   Lab Test 11/30/23  0339 11/29/23  0342 11/28/23 2005   INR  --   --  1.44*   *   < > 215   PTT  --   --  34   BUN  "34.4*   < > 25.0*    < > = values in this interval not displayed.       Physical Exam:  Vitals: /72   Pulse 81   Temp 98.8  F (37.1  C)   Resp 15   Ht 1.613 m (5' 3.5\")   Wt 72.8 kg (160 lb 7.9 oz)   SpO2 100%   BMI 27.98 kg/m      Physical Exam:   Orientation:  Alert, oriented, and in no acute distress: Yes  Sedation: No    Motor Examination:  5/5 Strength in lower extremities: Yes      Catheter Site:   Catheter entry site is clean/dry/intact: Yes    Tender: No      Relevant Medications:  Current Pain Medications:  Medications related to Pain Management (From now, onward)      Start     Dose/Rate Route Frequency Ordered Stop    12/01/23 0000  acetaminophen (TYLENOL) tablet 650 mg         650 mg Oral EVERY 4 HOURS PRN 11/28/23 1921      11/30/23 1000  acetaminophen (TYLENOL) tablet 650 mg        See Hyperspace for full Linked Orders Report.    650 mg Oral EVERY 4 HOURS 11/30/23 0943      11/30/23 1000  acetaminophen (TYLENOL) Suppository 650 mg        See Hyperspace for full Linked Orders Report.    650 mg Rectal EVERY 4 HOURS 11/30/23 0943      11/30/23 0940  HYDROmorphone (DILAUDID) injection 0.2 mg        See Hyperspace for full Linked Orders Report.    0.2 mg Intravenous EVERY 2 HOURS PRN 11/30/23 0943      11/30/23 0940  HYDROmorphone (DILAUDID) injection 0.4 mg        See Hyperspace for full Linked Orders Report.    0.4 mg Intravenous EVERY 2 HOURS PRN 11/30/23 0943      11/29/23 1100  aspirin (ASA) Suppository 300 mg         300 mg Rectal DAILY 11/29/23 1046      11/29/23 0800  polyethylene glycol (MIRALAX) Packet 17 g         17 g Oral DAILY 11/28/23 1921 11/28/23 2000  senna-docusate (SENOKOT-S/PERICOLACE) 8.6-50 MG per tablet 1 tablet         1 tablet Oral 2 TIMES DAILY 11/28/23 1921 11/28/23 1921  methocarbamol (ROBAXIN) tablet 500 mg         500 mg Oral EVERY 8 HOURS PRN 11/28/23 1921      11/28/23 1921  magnesium hydroxide (MILK OF MAGNESIA) suspension 30 mL         30 mL Oral " "DAILY PRN 11/28/23 1921      11/28/23 1921  bisacodyl (DULCOLAX) suppository 10 mg         10 mg Rectal DAILY PRN 11/28/23 1921      11/28/23 1921  oxyCODONE IR (ROXICODONE) half-tab 2.5 mg        See Hyperspace for full Linked Orders Report.    2.5 mg Oral EVERY 4 HOURS PRN 11/28/23 1921      11/28/23 1921  oxyCODONE (ROXICODONE) tablet 5 mg        See Hyperspace for full Linked Orders Report.    5 mg Oral EVERY 4 HOURS PRN 11/28/23 1921      11/28/23 1921  lidocaine 1 % 0.1-1 mL         0.1-1 mL Other EVERY 1 HOUR PRN 11/28/23 1921 11/28/23 1921  lidocaine (LMX4) cream          Topical EVERY 1 HOUR PRN 11/28/23 1921 11/28/23 1300  ROPivacaine 0.2% in NS perineural infusion (simple)          Perineural Continuous Nerve Block 11/28/23 1249              Primary Service Contacted with Recommendations? Yes      Acute Inpatient Pain Service [facility]  Hours of pain coverage 24/7   Page via Amcom- Please Page the Pain Team Via Amcom: \"PAIN MANAGEMENT ACUTE INPATIENT/ [facility]\"       "

## 2023-12-01 NOTE — PLAN OF CARE
ICU End of Shift Summary. See flowsheets for vital signs and detailed assessment.    Care provided [id number]    Changes this shift: Alert, oriented, following all commands. VSS on High flow, decreased to 25L, 40%. 2 small BM today. Passed swallow with speech, okay for a regular diet. Diurese done this morning, plan to diurese this afternoon/evening. K+ replaced.     Plan: Transfer to floor when bed available, recheck lytes after diureses, possible additional diureses this afternoon/evening. Remove central line.     Goal Outcome Evaluation:      Plan of Care Reviewed With: patient    Overall Patient Progress: improvingOverall Patient Progress: improving

## 2023-12-01 NOTE — PROGRESS NOTES
CV ICU PROGRESS NOTE  12/01/23      CO-MORBIDITIES:   S/P TVR (tricuspid valve replacement) and PFO closure (primary encounter diagnosis)  Patient Active Problem List   Diagnosis    SOB (shortness of breath)    Chronic diastolic heart failure (H)    Chronic atrial fibrillation (H)    Benign essential hypertension    Chronic kidney disease, stage III (moderate) (H)    Chronic right-sided heart failure (H)    Severe tricuspid regurgitation    History of aortic dissection    NEDA (generalized anxiety disorder)    History of blood transfusion    Cardiac pacemaker in situ    Complete heart block (H)    Atrial pacemaker lead displacement    Displacement of atrial pacemaker leads, initial encounter    Thrombocytopenia (H24)    Chronic diastolic congestive heart failure (H)    C. difficile colitis    Current use of long term anticoagulation    Tricuspid regurgitation        ASSESSMENT: Lavonne Funez is a 83 year old female with PMH of HFpEF, severe TR, permanent AF s/p AVN ablation with PPM implant 1/11/19 s/p extraction TV (transvenous) PPM and implant leadless PPM 7/31/23, emergent aortic dissection repair 1/4/19, HTN, CKD, NEDA, and obesity who underwent right minithoracotomy tricuspid valve replacement with 31mm [employer] Mert EPIC porcine bioprosthetic valve & PFO closure on 11/28/23 by Dr. Mabry.     Last 24 hours:  - Alternated between BiPAP and HFNC overnight  - Productive cough  - Started on Zosyn   - Last BM x2 overnight    Today's Progress:  - Remove CVC   - SLP eval: regular diet and thin liquids   - Continue diuresis Lasix 60 mg IV x1 and Chlorothiazide 500 mg x3   - Start Metoprolol 12.5 BID  - Start PTA spironolactone  - Device nurse to adjust pacemaker to PTA settings  - PO potassium replacement  - CMP recheck lytes at 1600 hours   - Possible EP cath removal today   - Possibly start Warfarin today   - Possibly remove CT today or tomorrow     PLAN:  Neuro/ pain/ sedation:  # Risk for delirium  Currently A&Ox3    - Monitor neurological status.   - Notify the MD for any acute changes in exam.  #Acute postoperative pain  - Scheduled: tylenol   - PRN: tylenol, oxycodone, dilaudid, robaxin  - Paravetebral catheter in place with ropivacaine, will discuss duration and removal pending initiation of warfarin.   # Hx NEDA  - PTA lexapro/Escitalopram   - PTA remeron/mirtazapine   #Sedation, resolved  - Off all sedation, extubated 11/28     Pulmonary care:   # Acute hypercapnic and hypoxic respiratory failure, requiring BiPAP, improving   # S/P Mechanical ventilation, extubated 11/28  # History of tracheostomy (2019)  # Aspiration pneumonitis vs pneumonia   # Possible aspiration event   - Goal SpO2 > 92%, BiPAP and HFNC intermittently as needed   - Encourage IS q15-30 minutes when awake.  - CXR today with left basilar consolidation and bilateral diffuse opacities typical of atelectasis/edema   - DUONEB Q4H scheduled   - CPT with RT     Cardiovascular:    # S/p TV replacement 11/28/2023  # Cardiogenic shock, requiring epi, resolved    # Vasoplegic shock, resolved   # PPM, upgraded to leadless 7/2023  # Hx HFpEF  # Hx Afib s/p AVN ablation  # Hx HTN  # Hx HLD  # Hx Aortic dissection s/p emergent repair 2019  - Device nurse consulted for PPM: currently VVI @ 80. Page to restore her PTA settings with rate around 60, as tolerated.   - Monitor hemodynamic status.   - Goal MAP >65, SBP <140  - Start 12.5 Metoprolol BID (plan to increase to PTA 25mg BID)   - Start PTA spironolactone  - Hold PTA torsemide   - ASA 81 PO    - Remove CVC today   - Continue diuresis as below     GI care / Nutrition:   # Elevated AST, resolved   # At risk for protein malnutrition  # Hx diverticulosis  - Nutrition consulted, appreciate recommendations   - SLP evaluation: Regular diet with thin liquids   - PPI for GI prophylaxis  - Bowel regimen: MiraLAX, senna, PRN Milk of mag and suppository   - Last BM x2 11/30    Renal / Fluids / Electrolytes:   # Volume status    # Hypernatremia  # Hypokalemia   # Lactic acidosis, resolved  # Electrolyte derangement  # Hx of CKD  BL creat appears to be ~ 1.2-1.3  - Strict I/O, daily weights  - Bedside echo performed 11/29   - Avoid/limit nephrotoxins as able  - Replete lytes PRN per protocol  - Lasix 60 mg IV now   - Chlorothiazide 500 mg x3   - Restart PTA spironolactone  - PO potassium replacement  - CMP recheck lytes at 1600 hours      Endocrine:    # Stress hyperglycemia, resolved   # DM Type II   # Obesity   Preop A1c 5.7  - Discontinue insulin gtt  - Hold PTA Jardiance  - Goal BG <180 for optimal healing     ID / Antibiotics:  # Stress induced leukocytosis, improving   # Aspiration pneumonia vs pneumonitis  # Possible aspiration event   # Hx of C. Diff  - Finished perioperative regimen  - Continue to monitor fever curve, WBC, and inflammatory markers as appropriate  - Continue Zosyn for 5 day course    - Sputum cx pending   - MRSA  negative      Heme:     # Acute blood loss anemia, improving   No s/sx active bleeding  - Continue to monitor  - CBC on arrival and daily  - Hgb goal > 7.0  - Two chest tubes in place, one MS and one Pleural, no airleak - possible removal today      MSK / Skin:  # s/p Thoracotomy  # Rib fracture s/p retraction   # Surgical Incision  - Right Thoracotomy precautions  - Postoperative incision management per protocol  - Multimodal pain management as above   - PT/OT/CR     Prophylaxis:     - Mechanical ppx for DVT  - Chemical DVT ppx: SQH   - PPI  - Bowel regimen: MiraLAX, senna, PRN milk of mag and suppository     Lines / Tubes / Drains:  - RIJ CVC - remove today   - CTs x2 - possibly remove today   - Barrientos     Disposition:  - CVICU      ICU Checklist  F - Feeding: Regular diet with thin liquids per SLP   A - Analgesia: multi-modal with paravertebral cath, scheduled and PRN oral medications  S - Sedation: Off  T - Thromboembolic prophylaxis: SCDs, SQH      H - Head of bed elevated: yes   U - Ulcer prophylaxis:  PPI  G - Glycemic control: Discontinued insulin gtt  S - SBT: NA      B - Bowel regimen: Miralax, Senna, PRN milk of mag and suppository  I - Indwelling catheter: Barrientos   D - De-escalation of antibiotics: Zosyn x 5 days     Patient seen, findings and plan discussed with CVICU staff    Quin Langford  Anesthesiology CA-1/PGY-2      ====================================    SUBJECTIVE:   She states she is doing better today but lots of productive cough. Wants to take BiPAP off and get out of bed.     OBJECTIVE:   1. VITAL SIGNS:   Temp:  [98.2  F (36.8  C)-99.9  F (37.7  C)] 98.8  F (37.1  C)  Pulse:  [77-85] 81  Resp:  [17-46] 25  BP: ()/() 124/65  FiO2 (%):  [50 %-70 %] (P) 50 %  SpO2:  [75 %-100 %] 100 %  Vent Mode: CPAP/PS  (Continuous positive airway pressure with Pressure Support)  FiO2 (%): (S) 60 %  Resp: 25      2. INTAKE/ OUTPUT:   I/O last 3 completed shifts:  In: 425.6 [I.V.:425.6]  Out: 3135 [Urine:3015; Chest Tube:120]    3. PHYSICAL EXAMINATION:   General: NAD, HOB elevated lying supine.   Neuro: A&Ox3. Moves all extremities purposefully.  Follows commands. Answers questions appropriately.   Resp:  on BiPAP, Coarse breath sounds bilaterally R>L. No wheezing appreciated.CV: Paced at 80. Extremities well perfused.   Abdomen: Soft, Non-distended, Non-tender  Incisions: c/d/I, CTx2, no air leaks, draining serosanguinous fluid.   Extremities: warm, well perfused, no pitting edema appreciated.     4. INVESTIGATIONS:   Arterial Blood Gases   Recent Labs   Lab 11/30/23  0339 11/29/23  2352 11/29/23  1947 11/29/23  1511   PH 7.35 7.35 7.37 7.37   PCO2 47* 48* 44 47*   PO2 85 80 69* 93   HCO3 26 26 26 27     Complete Blood Count   Recent Labs   Lab 12/01/23  0355 11/30/23  0339 11/29/23  0342 11/28/23 2005   WBC 12.4* 18.3* 27.3* 23.8*   HGB 11.1* 10.5* 12.1 12.8   * 142* 165 215     Basic Metabolic Panel  Recent Labs   Lab 12/01/23  0355 11/30/23  2058 11/30/23  1724 11/30/23  1547  11/30/23 0343 11/30/23 0339 11/29/23  1515 11/29/23  1510 11/29/23 0543 11/29/23 0342   *  --   --   --   --  142  --  143  --  139   POTASSIUM 3.3*  --   --   --   --  4.1  --  3.9  --  4.4  4.4   CHLORIDE 106  --   --   --   --  106  --  106  --  104   CO2 26  --   --   --   --  22  --  24  --  21*   BUN 33.5*  --   --   --   --  34.4*  --  31.6*  --  28.0*   CR 1.39*  --   --   --   --  1.42*  --  1.44*  --  1.30*   * 107* 108* 95   < > 144*   < > 94   < > 154*    < > = values in this interval not displayed.     Liver Function Tests  Recent Labs   Lab 12/01/23 0355 11/30/23 0339 11/29/23 1510 11/29/23 0342 11/28/23 2005 11/28/23 2005 11/28/23 1818   AST 39 57* 69* 86*  --   --   --    ALT 12 18 23 23   < > 23  --    ALKPHOS 90 84 80 81   < > 87  --    BILITOTAL 0.9 0.7 0.9 1.2   < > 1.7*  --    ALBUMIN 3.4* 3.3* 3.5 3.5   < > 3.5  --    INR  --   --   --   --   --  1.44* 1.68*    < > = values in this interval not displayed.     Pancreatic Enzymes  No lab results found in last 7 days.  Coagulation Profile  Recent Labs   Lab 11/28/23 2005 11/28/23 1818   INR 1.44* 1.68*   PTT 34 30         5. RADIOLOGY:   Recent Results (from the past 24 hour(s))   XR Chest Port 1 View    Narrative    Portable chest 11/30/2023 at 0804 hours    INDICATION: Hypoxia    COMPARISON: 11/30/2023 at 0543 hours    Findings: Heart is mildly enlarged. Lead left pacemaker again noted.  Median sternotomy again present. Large bore right IJ approach catheter  tip in the SVC. Right thoracostomy tube again present. No definitive  pneumothorax. Haziness in the right lower lung field is similar to  minimally increased. Right hemidiaphragm remains mildly elevated.      Impression    IMPRESSION: Continued elevation right hemidiaphragm with slight  increased edema or atelectasis at the right lung base. No definitive  pneumothorax.    NASEEM RUEDA MD         SYSTEM ID:  [id number]   XR Chest Port 1 View    Narrative     Exam: XR CHEST PORT 1 VIEW, 12/1/2023 12:31 AM    Indication: Daily CXR in ICU    Comparison: 11/30/2023    Findings:   Portable frontal view of the chest. Postsurgical changes of the chest  with intact median sternotomy wires. Right IJ sheath in the mid SVC.  Stable tricuspid valve prosthesis, leadless pacemaker, right chest  tubes.    Stable enlarged cardiac silhouette. Similar small pleural effusions.  No pneumothorax. Increased left basilar consolidation. Ongoing diffuse  interstitial and right basilar opacities.      Impression    Impression:   1. Stable support devices.  2. Increased  left basilar consolidation, possibly infection versus  atelectasis. Additional bilateral diffuse opacities typical of  atelectasis/edema.    I have personally reviewed the examination and initial interpretation  and I agree with the findings.    JESSICA GARCIA DO         SYSTEM ID:  [id number]       =========================================

## 2023-12-01 NOTE — PHARMACY-ANTICOAGULATION SERVICE
Clinical Pharmacy - Warfarin Dosing Consult     Pharmacy has been consulted to manage this patient s warfarin therapy.  Indication: Atrial Fibrillation;Bioprosthetic Heart Valve  Therapy Goal: INR 2-3  OP Anticoag Clinic: [facility]  Warfarin Prior to Admission: Yes  Warfarin PTA Regimen: 2 mg on Monday and Fridays and 3 mg AOD  Significant drug interactions: SBQH and ASA    INR   Date Value Ref Range Status   12/02/2023 1.40 (H) 0.85 - 1.15 Final   12/01/2023 1.39 (H) 0.85 - 1.15 Final     Recommend warfarin 1 mg today.  Pharmacy will monitor Priya Funez daily and order warfarin doses to achieve specified goal.      Please contact pharmacy as soon as possible if the warfarin needs to be held for a procedure or if the warfarin goals change.

## 2023-12-02 NOTE — PHARMACY-ADMISSION MEDICATION HISTORY
Pharmacy Intern Admission Medication History    Admission medication history is complete. The information provided in this note is only as accurate as the sources available at the time of the update.    Information Source(s): Patient and CareEverywhere/SureScripts via in-person    Pertinent Information:   Warfarin:  Clinic: [facility]  Goal INR: 2.0-3.0  Current warfarin regimen:  Patient has warfarin 2 mg tablets at home  Per patient: 3 mg (1.5 tablets) daily  Per Anticoagulation visit on 11/22:  11/23-11/27: Hold  2 mg (1 tablet) every Monday and Friday  3 mg (1.5 tablets) all other days    Changes made to PTA medication list:  Added: None  Deleted: None  Changed: None    Allergies reviewed with patient and updates made in EHR: yes    Medication History Completed By: Cristel Rosa 12/2/2023 1:43 PM    PTA Med List   Medication Sig Last Dose    Acetaminophen 325 MG CAPS Take 325-650 mg by mouth every 4 hours as needed (Pain/Fever) Past Month at unknown    albuterol (PROAIR HFA/PROVENTIL HFA/VENTOLIN HFA) 108 (90 Base) MCG/ACT inhaler Inhale 2 puffs into the lungs every 6 hours as needed for shortness of breath / dyspnea or wheezing More than a month at unknown    aspirin (ASA) 81 MG EC tablet Take 81 mg by mouth daily 11/26/2023 at unknown    cholecalciferol 25 MCG (1000 UT) TABS Take 1,000 Units by mouth daily  11/24/2023 at unknown    cyanocobalamin (VITAMIN B-12) 1000 MCG tablet Take 1,000 mcg by mouth daily  11/24/2023 at unknown    empagliflozin (JARDIANCE) 10 MG TABS tablet Take 1 tablet (10 mg) by mouth daily 11/25/2023 at unknown    escitalopram (LEXAPRO) 10 MG tablet Take 1 tablet (10 mg) by mouth daily 11/27/2023 at unknown    metoprolol tartrate (LOPRESSOR) 25 MG tablet Take 1 tablet (25 mg) by mouth 2 times daily 11/28/2023 at 0700    mirtazapine (REMERON) 15 MG tablet Take 1 tablet (15 mg) by mouth At Bedtime 11/28/2023 at 1600    spironolactone (ALDACTONE) 25 MG tablet Take 1 tablet (25  mg) by mouth daily Past Week at unknown    torsemide (DEMADEX) 10 MG tablet Take 4 tablets(40mg) by mouth every day 11/27/2023 at 0800    warfarin ANTICOAGULANT (COUMADIN) 2 MG tablet Take 2 to 4mg (1 to 2 tabs) by mouth daily OR AS DIRECTED.  Adjust dose based on INR. 11/23/2023 at unknown

## 2023-12-02 NOTE — PROGRESS NOTES
CV ICU PROGRESS NOTE  12/02/23       CO-MORBIDITIES:   S/P TVR (tricuspid valve replacement) and PFO closure (primary encounter diagnosis)  Patient Active Problem List   Diagnosis    SOB (shortness of breath)    Chronic diastolic heart failure (H)    Chronic atrial fibrillation (H)    Benign essential hypertension    Chronic kidney disease, stage III (moderate) (H)    Chronic right-sided heart failure (H)    Severe tricuspid regurgitation    History of aortic dissection    NEDA (generalized anxiety disorder)    History of blood transfusion    Cardiac pacemaker in situ    Complete heart block (H)    Atrial pacemaker lead displacement    Displacement of atrial pacemaker leads, initial encounter    Thrombocytopenia (H24)    Chronic diastolic congestive heart failure (H)    C. difficile colitis    Current use of long term anticoagulation    Tricuspid regurgitation        ASSESSMENT: Lavonne Funez is a 83 year old female with PMH of HFpEF, severe TR, permanent AF s/p AVN ablation with PPM implant 1/11/19 s/p extraction TV (transvenous) PPM and implant leadless PPM 7/31/23, emergent aortic dissection repair 1/4/19, HTN, CKD, NEDA, and obesity who underwent right minithoracotomy tricuspid valve replacement with 31mm [employer] EPIC porcine bioprosthetic valve & PFO closure on 11/28/23 by Dr. Mabry.     Last 24 hours:  - Productive cough  - Started on Zosyn   - Loose BM x4+ overnight, poor sleep   - Metoprolol overnight dose held due to hypotension       Today's Progress:  - CXR Repeated this morning with increased left sided opacities suspicious for pneumonia  - Trend Procal  - Paravertebral pain catheter removed   - Continue Zosyn 10 day course   - Repeat Sputum Cx    - Remove coello   - CBC at 1600 to monitor WBC   - Will reconsider antibiotic regimen if increasing concern for infection   - Continue metoprolol 12.5 BID (hold for SBP <120)     PLAN:  Neuro/ pain/ sedation:  # Risk for delirium  Currently A&Ox3   -  Monitor neurological status.   - Notify the MD for any acute changes in exam.  #Acute postoperative pain  - Scheduled: tylenol   - PRN: tylenol, oxycodone, dilaudid, robaxin  - Paravetebral catheter with ropivacaine - removed today   # Hx NEDA  - PTA lexapro/Escitalopram   - PTA remeron/mirtazapine   #Sedation, resolved  - Off all sedation, extubated 11/28     Pulmonary care:   # Acute hypercapnic and hypoxic respiratory failure, requiring BiPAP, improving   # S/P Mechanical ventilation, extubated 11/28  # History of tracheostomy (2019)  # Aspiration pneumonitis vs pneumonia, left-sided   # Possible aspiration event   # Hx of left hemidiaphragm   - Goal SpO2 > 92%, BiPAP and HFNC intermittently as needed   - Encourage IS q15-30 minutes when awake.  - DUONEB Q4H scheduled with RT   - CPT with RT   - Repeat CXR this morning with worsening infiltrates/opacity on left side, suspicious for pneumonia vs pneumonitis.   - Continue Zosyn for 10 day course, and reconsider antibiotic coverage   - Follow up labs: Procal now and Q48H x3     Cardiovascular:    # S/p TV replacement 11/28/2023  # Cardiogenic shock, requiring epi, resolved    # Vasoplegic shock, resolved   # PPM, upgraded to leadless 7/2023  # Hx HFpEF  # Hx Afib s/p AVN ablation  # Hx HTN  # Hx HLD  # Hx Aortic dissection s/p emergent repair 2019  - Device nurse for PPM restored her PTA settings w/ rate of 60 on 12/1/2023  - Monitor hemodynamic status.   - Goal MAP >65, SBP <140  - Continue 12.5 Metoprolol BID (PTA was 25mg BID) but hold for SBP less than 120   - Continue ASA 81   - Continue Warfarin (INR goal 2-3)   - PTA spironolactone   - Hold PTA torsemide     GI care / Nutrition:   # Elevated AST, resolved   # At risk for protein malnutrition  # Hx diverticulosis  - Nutrition consulted, appreciate recommendations   - SLP evaluation: Regular diet with thin liquids   - PPI for GI prophylaxis  - Bowel regimen: MiraLAX, senna, PRN Milk of mag and suppository    -  Last BM x4 overnight     Renal / Fluids / Electrolytes:   # Volume status   # Hypernatremia  # Hypokalemia   # Lactic acidosis, resolved  # Electrolyte derangement  # Hx of CKD  BL creat appears to be ~ 1.2-1.3  - Strict I/O, daily weights  - Bedside echo performed 11/29   - Avoid/limit nephrotoxins as able  - Replete lytes PRN per protocol  - Chlorothiazide 500 mg finish 3 doses total   - PTA spironolactone  - PO potassium replacement  - CBC   - discontinue coello      Endocrine:    # Stress hyperglycemia, resolved   # DM Type II   # Obesity   Preop A1c 5.7  - Discontinue insulin gtt  - Hold PTA Jardiance  - Goal BG <180 for optimal healing     ID / Antibiotics:  # Stress induced leukocytosis, improving   # Aspiration pneumonia vs pneumonitis, left side   # Possible aspiration event   # Hx of C. Diff  - Finished perioperative regimen  - Continue to monitor fever curve, WBC, and inflammatory markers as appropriate  - Continue Zosyn for 10 day course    - MRSA  negative   - Procal now and q48h x4  - Repeat Sputum culture   - Recheck CBC @ 1600 to trend WBC   - Low threshold to reconsider antibiotic regimen   - Follow daily CXR      Heme:     # Acute blood loss anemia, improving   No s/sx active bleeding  - Continue to monitor  - CBC on arrival and daily  - Hgb goal > 7.0     MSK / Skin:  # s/p Thoracotomy  # Rib fracture s/p retraction   # Surgical Incision  - Right thoracotomy precautions  - Postoperative incision management per protocol  - Multimodal pain management as above   - PT/OT/CR  - Pain cath removal today       Prophylaxis:     - Mechanical ppx for DVT  - Chemical DVT ppx: SQH and warfarin   - PPI  - Bowel regimen: MiraLAX, senna, PRN milk of mag and suppository - Holding for loose stools      Lines / Tubes / Drains:  - Coello- remove today      Disposition:  - Floor        ICU Checklist  F - Feeding: Regular diet with thin liquids per SLP   A - Analgesia: multi-modal with paravertebral cath - remove,  scheduled and PRN oral medications  S - Sedation: Off  T - Thromboembolic prophylaxis: SCDs, SQH, Warfarin       H - Head of bed elevated: yes   U - Ulcer prophylaxis: PPI  G - Glycemic control: NA  S - SBT: NA      B - Bowel regimen: Miralax, Senna, PRN milk of mag and suppository - holding for loose stools   I - Indwelling catheter: Barrientos remove today   D - De-escalation of antibiotics: Zosyn x 10 days     Patient seen, findings and plan discussed with CVICU staff    Quin Langford  Anesthesiology CA-1/PGY-2      ====================================    SUBJECTIVE:   She states she is doing better today but lots of productive cough. Wants to take BiPAP off and get out of bed.     OBJECTIVE:   1. VITAL SIGNS:   Temp:  [98.1  F (36.7  C)-99.5  F (37.5  C)] 98.6  F (37  C)  Pulse:  [60-99] 74  Resp:  [15-39] 20  BP: ()/(45-92) 131/76  FiO2 (%):  [40 %-50 %] 40 %  SpO2:  [94 %-100 %] 99 %  FiO2 (%): 40 %  Resp: 20      2. INTAKE/ OUTPUT:   I/O last 3 completed shifts:  In: 1010 [P.O.:150; I.V.:860]  Out: 2955 [Urine:2805; Chest Tube:150]    3. PHYSICAL EXAMINATION:   General: NAD, sitting upright in bed eating breakfast   Neuro: A&Ox3. Moves all extremities purposefully.   Resp:  on NC, Coarse breath sounds bilaterally.   CV: Paced at 60. Extremities well perfused.   Abdomen: Soft, Non-distended, Non-tender  Incisions: c/d/I  Extremities: warm, no pitting edema appreciated.     4. INVESTIGATIONS:   Arterial Blood Gases   Recent Labs   Lab 11/30/23  0339 11/29/23  2352 11/29/23  1947 11/29/23  1511   PH 7.35 7.35 7.37 7.37   PCO2 47* 48* 44 47*   PO2 85 80 69* 93   HCO3 26 26 26 27     Complete Blood Count   Recent Labs   Lab 12/02/23  0240 12/01/23  0355 11/30/23  0339 11/29/23  0342   WBC 13.8* 12.4* 18.3* 27.3*   HGB 11.1* 11.1* 10.5* 12.1   * 109* 142* 165     Basic Metabolic Panel  Recent Labs   Lab 12/02/23  0240 12/01/23  2038 12/01/23  1550 12/01/23  1119 12/01/23  0355   * 149* 148*  --   149*   POTASSIUM 3.6 4.3  4.3 3.1* 3.6 3.3*   CHLORIDE 107 107 107  --  106   CO2 28 28 26  --  26   BUN 36.2* 37.6* 36.1*  --  33.5*   CR 1.37* 1.42* 1.38*  --  1.39*   * 124* 97  --  104*     Liver Function Tests  Recent Labs   Lab 12/02/23  0240 12/01/23  1634 12/01/23  1550 12/01/23 0355 11/30/23 0339 11/29/23 0342 11/28/23 2005 11/28/23 1818   AST 31  --  34 39 57*   < >  --   --    ALT 12  --  10 12 18   < > 23  --    ALKPHOS 102  --  88 90 84   < > 87  --    BILITOTAL 0.9  --  0.9 0.9 0.7   < > 1.7*  --    ALBUMIN 3.4*  --  3.3* 3.4* 3.3*   < > 3.5  --    INR 1.40* 1.39*  --   --   --   --  1.44* 1.68*    < > = values in this interval not displayed.     Pancreatic Enzymes  No lab results found in last 7 days.  Coagulation Profile  Recent Labs   Lab 12/02/23 0240 12/01/23  1634 11/28/23 2005 11/28/23 1818   INR 1.40* 1.39* 1.44* 1.68*   PTT  --   --  34 30         5. RADIOLOGY:   Recent Results (from the past 24 hour(s))   Cardiac Device Check - Inpatient   Result Value    Date Time Interrogation Session [id number]    Implantable Pulse Generator  Medtronic    Implantable Pulse Generator Model [id number] Micra VR TCP    Implantable Pulse Generator Serial Number [id number]    Type Interrogation Session In Clinic    Clinic Name [facility]    Implantable Pulse Generator Type Pacemaker    Implantable Pulse Generator Implant Date [id number]    Festus Setting Mode (NBG Code) VVIR    Festus Setting Lower Rate Limit 60    Festus Setting Maximum Sensor Rate 120    Festus Setting Hysterisis Rate DISABLED    Lead Channel Setting Sensing Polarity Bipolar    Lead Channel Setting Sensing Anode Location Right Ventricle    Lead Channel Setting Sensing Anode Terminal Ring    Lead Channel Setting Sensing Cathode Location Right Ventricle    Lead Channel Setting Sensing Cathode Terminal Tip    Lead Channel Setting Sensing Sensitivity 2    Lead Channel Setting Pacing Polarity Bipolar     Lead Channel Setting Pacing Anode Location Right Ventricle    Lead Channel Setting Pacing Anode Terminal Ring    Lead Channel Setting Sensing Cathode Location Right Ventricle    Lead Channel Setting Sensing Cathode Terminal Tip    Lead Channel Setting Pacing Pulse Width 0.24    Lead Channel Setting Pacing Amplitude 2.625    Lead Channel Setting Pacing Capture Mode Adaptive    Zone Setting Type Category VF    Zone Setting Vendor Type Category V High Rate    Zone Setting Type Category VT    Zone Setting Vendor Type Category FastVT    Zone Setting Type Category VT    Zone Setting Vendor Type Category VT    Zone Setting Type Category VT    Zone Setting Vendor Type Category MonVT    Zone Setting Type Category ATRIAL_FIBRILLATION    Zone Setting Vendor Type Category FastATAF    Zone Setting Type Category AT/AF    Lead Channel Impedance Value 430    Lead Channel Sensing Intrinsic Amplitude 15    Lead Channel Pacing Threshold Amplitude 1.63    Lead Channel Pacing Threshold Pulse Width 0.24    Battery Date Time of Measurements [id number]    Battery Status Middle of Service    Battery RRT Trigger 2.5579    Battery Remaining Longevity 75    Battery Voltage 2.99    Festus Statistic Date Time Start [id number]    Festus Statistic Date Time End [id number]    Festus Statistic RV Percent Paced 99.84    Narrative    Patient seen in [facility] 4[facility] for evaluation and iterative programming of a Medtronic Micra leadless pacemaker per MD orders.    Device: Medtronic [id number] Micra VR TCP  Normal Device Function  Mode: VVIR  bpm  : 99.8%  Intrinsic Rhythm:  30 bpm  Short V-V intervals: 0  Electrode impedance trend appears stable  Estimated battery longevity to RRT = 5.2 years./Battery voltage = 2.99 V.   Setting Changes: Settings returned to pre-admission values; LRL decreased from 80 bpm to 60 bpm.    Plan: Page device RN as needed while inpatient.   BALJINDER Jaramillo RN    Leadless pacemaker    I have reviewed and interpreted the  device interrogation, settings, programming and nurse's summary. The device is functioning within normal device parameters. I agree with the current findings, assessment and plan.       =========================================

## 2023-12-02 NOTE — PLAN OF CARE
Major Shift Events: A&Ox4, anxious appearing at times. Minimal sleep overnight, pt reports she is unable to get comfortable, up to commode frequently, senna skipped in PM. Offered repositioning and pain medications, pt denied, did take tylenol and melatonin without improvement. CT removed by CVTS. Oxygen titrated down to 4L NC, has some ALEXANDER and tachypnea with movement. Pacer RN reset rate to 60, VSS. PM PO metoprolol held per CVTS for borderline low MAPs. Large UOP, up to commode nearly hourly for small bowel movements. Electrolytes replaced per protocol.  Plan: transfer to floor, electrolyte management.  For vital signs and complete assessments, please see documentation flowsheets.     Problem: Cardiovascular Surgery  Goal: Fluid and Electrolyte Balance  Outcome: Progressing  Intervention: Monitor and Manage Fluid and Electrolyte Balance  Recent Flowsheet Documentation  Taken 12/2/2023 0400 by Marlen Hein RN  Fluid/Electrolyte Management: fluids provided  Taken 12/2/2023 0000 by Marlen Hein RN  Fluid/Electrolyte Management: fluids provided  Taken 12/1/2023 2000 by Marlen Hein RN  Fluid/Electrolyte Management: fluids provided  Taken 12/1/2023 1600 by Marlen Hein RN  Fluid/Electrolyte Management: fluids provided  Goal: Effective Urinary Elimination  Outcome: Progressing  Intervention: Monitor and Manage Urinary Retention  Recent Flowsheet Documentation  Taken 12/2/2023 0400 by Marlen Hein RN  Urinary Elimination Promotion: catheter patency maintained  Taken 12/2/2023 0000 by Marlen Hein RN  Urinary Elimination Promotion: catheter patency maintained  Taken 12/1/2023 2000 by Marlen Hein RN  Urinary Elimination Promotion: catheter patency maintained  Taken 12/1/2023 1600 by Marlen Hein RN  Urinary Elimination Promotion: catheter patency maintained  Goal: Effective Oxygenation and Ventilation  Outcome: Progressing  Intervention: Promote Airway Secretion Clearance  Recent  Flowsheet Documentation  Taken 12/2/2023 0400 by Marlen Hein RN  Administration (IS): self-administered  Cough And Deep Breathing: done with encouragement  Taken 12/2/2023 0000 by Marlen Hein RN  Administration (IS): self-administered  Cough And Deep Breathing: done with encouragement  Taken 12/1/2023 2000 by Marlen Hein RN  Administration (IS): self-administered  Cough And Deep Breathing: done with encouragement  Taken 12/1/2023 1600 by Marlen Hein RN  Administration (IS): self-administered  Cough And Deep Breathing: done with encouragement  Intervention: Optimize Oxygenation and Ventilation  Recent Flowsheet Documentation  Taken 12/2/2023 0400 by Marlen Hein RN  Chest Tube Safety: all connections secured  Taken 12/2/2023 0000 by Marlen Hein RN  Chest Tube Safety: all connections secured  Taken 12/1/2023 2000 by Marlen Hein RN  Chest Tube Safety: all connections secured  Taken 12/1/2023 1600 by Marlen Hein RN  Chest Tube Safety: all connections secured   Goal Outcome Evaluation:

## 2023-12-02 NOTE — PROGRESS NOTES
Transferred to: Unit 6C  at 1230  Belongings: Sent with patient  Barrientos removed? No: Diuretics  Central line removed? Yes  Chart and medications sent with patient Yes  Family notified: Yes

## 2023-12-02 NOTE — PLAN OF CARE
Speech Language Therapy Discharge Summary    Reason for therapy discharge:    All goals and outcomes met, no further needs identified.    Progress towards therapy goal(s). See goals on Care Plan in [facility] electronic health record for goal details.  Goals met    Therapy recommendation(s):    No further therapy is recommended.    Recommend regular textures and thin liquids. Pt should be fully upright and alert for all PO, take small sips/bites, slow pacing, and alternate between consistencies. Pt with remote hx of dysphagia and aspiration and would likely benefit from repeat VFSS, however pt politely declining at this time. Swallowing goals met. Will complete orders.

## 2023-12-03 NOTE — PROGRESS NOTES
Cardiovascular Surgery Progress Note  12/03/2023         Assessment and Plan:     Priya Funez is a 83 year old female with a PMH of HFpEF, severe TR, permanent AF s/p AVN ablation with PPM implant 1/11/19 s/p extraction TV (transvenous) PPM and implant leadless PPM 7/31/23, emergent aortic dissection repair 1/4/19, HTN, CKD, NEDA, and obesity who presented as an outpatient for elective TVR. Patient is now s/p mini TVR on 11/28/2023 with Dr. Mabry.    Cardiovascular:   Hx of severe TR s/p TVR  HFpEF  Hx of Afib s/p AVN ablation - PPM, upgraded to leadless 7/2023  Hypertension  Hyperlipidemia  Hx of aortic dissection repair 2019  No arrhythmias overnight, HD stable, paced rhythm.  Most recent echo pre-op 9/8/23 demonstrated LVEF 55/60%, ildly dilated RV, normal function  - ASA 81 mg daily  - Metoprolol 12.5 mg BID (PTA 25 mg daily)  - Device RN to adjust PPM to PTA settings rate 60   - PTA spironolactone 25 mg daily  - Holding PTA jardiance  - Diuresis as below  - Post-op echo ordered for Mon 12/4   Chest tubes: removed in ICU  TPW: removed in ICU    Pulmonary:  Acute hypercapnic and hypoxic respiratory failure requiring BiPAP, improving  Aspiration pneumonitis vs pneumonia  Possible aspiration event 12/1  Extubated POD 0 to 4 lpm via NC. Now saturating well on 3 lpm.   - Supplemental O2 PRN to keep sats > 92%. Wean off as tolerated.  - Pulm hygiene, IS, activity and deep breathing  - Zosyn x 10 day course as below  - Chest physiotherapy ordered  - Duonebs q4 hrs scheduled  - CXR 12/3 improving    Neurology / MSK:  Acute post-operative pain   Hx of general anxiety disorder  Pain well controlled with current regimen:  - PRN Acetaminophen, oxycodone, IV dilaudid, methocarbamol  - Pain catheter removed 12/2  - PTA Lexapro and Remeron     / Renal / Fluid / Electrolytes:  CKD stage 3  Lactic acidosis, resolved  BL creat ~1.2-1.3, most recent creatinine 1.19; adequate UOP.   FLUID STATUS: Pre-op weight 156, weight  today 154 lbs; I/O past 24 hours: -800 ml.  - Diuresis: PTA spironolactone  - PTA torsemide has been on hold, appears euvolemic, possibly restart tomorrow  - S/p 3 doses chlorothiazide 500 mg  - Replete lytes per protocol  - Strict I/O, daily weights  - Avoid/limit nephrotoxins as able    GI / Nutrition:   Multiple loose BMs  Hx of Cdiff  - Tolerating regular diet  - SLP recommended regular diet with thin liquids. Patient with history of dysphagia, possible aspiration event on 12/1.  - Reporting multiple loose BMs, will check cdiff  - Start imodium if cdiff negative    Endocrine:  Stress induced hyperglycemia  T2DM  Hgb A1c 5.7%  - Initially managed on insulin drip postop, transitioned to sliding scale; goal BG <180 for optimal healing  - PTA Jardiance on hold    Infectious Disease:  Stress induced leukocytosis  Aspiration pneumonitis vs pneumonia  Hx of Cdiff  WBC now WNL, remains afebrile  - Completed perioperative antibiotics  - Leukocytosis and concern for pneumonia. Procal 2.01  - Started on zosyn 11/30, plan for 10 day course, sputum culture pending  - Trend procal 12/4  - Continue to monitor fever curve, CBC    Hematology:   Acute blood loss anemia and thrombocytopenia  Hgb 11.2->9.3; Plt 138, no signs or symptoms of active bleeding  - will recheck hgb this PM given 2 g drop, likely diluational  - CBC daily    Anticoagulation:   - ASA  - PTA coumadin for afib, goal INR goal 2-3, INR 1.45    MSK/Skin:  Mini-thoracotomy  Rib fracture  Surgical incision  - Mini thoracotomy precautions  - Incisional cares per protocol    Prophylaxis:   - Stress ulcer prophylaxis: Pantoprazole 40 mg daily for 30 days  - DVT prophylaxis: Subcutaneous heparin, SCD    Disposition:   - Transferred to  on 12/2  - Therapies recommending discharge to rehab, pending antibiotic plan and O2 wean    Dr. Mabry has been informed of changes through both verbal and written communication.      Nathaniel Jordan PA-C  Cardiothoracic Surgery  Pager:  "[phone]        Interval History:     No overnight events.  States pain is well managed on current regimen. Slept well overnight.  Tolerating diet, is passing flatus, multiple loose BMs. No nausea or vomiting. Denies any abdominal pain  Still reports SOB, unchanged from yesterday.    Denies chest pain, palpitations, dizziness, syncopal symptoms, fevers, chills, myalgias, or sternal popping/clicking.         Physical Exam:   Vital signs:  Temp: 98.3  F (36.8  C) Temp src: Oral BP: (!) 154/72 Pulse: 60   Resp: 20 SpO2: 100 % O2 Device: Nasal cannula Oxygen Delivery: 2 LPM Height: 161.3 cm (5' 3.5\") Weight: 70.2 kg (154 lb 12.8 oz)  Estimated body mass index is 26.99 kg/m  as calculated from the following:    Height as of this encounter: 1.613 m (5' 3.5\").    Weight as of this encounter: 70.2 kg (154 lb 12.8 oz).    Gen: A&Ox4, NAD  Neuro: no focal deficits   CV: RRR, normal S1 S2, no murmurs, rubs or gallops.   Pulm: Lungs sounds diminished in bases, no wheezing or rhonchi, slightly tachypnic on 3 lpm  Abd: nondistended, normal BS, soft, nontender  Ext: No peripheral edema  Incision: clean, dry, intact, no erythema  Tubes/drain sites: dressing clean and dry         Data:    Imaging:  reviewed recent imaging, no acute concerns, opacities appear improved on CXR 12/3 (read pending)    Recent Results (from the past 24 hour(s))   XR Chest Port 1 View    Narrative    EXAM: Chest radiograph 12/2/2023 11:43 AM    HISTORY: Concern for pneumonia.     COMPARISON: Chest radiograph 12/1/2023. CT 2/3/2023.    TECHNIQUE: Portable AP view of the chest.    FINDINGS: Postsurgical chest with intact sternotomy wires. Removal of  a right IJ sheath. Tricuspid valve prosthesis. Leadless pacemaker  device. Unchanged right hemidiaphragmatic elevation with associated  streaky basilar platelike atelectasis. The trachea is midline and the  cardiac size is stable. Patchy opacities in the left lung are similar  to the most recent comparison " radiograph however worsened from  11/30/2023. There is no pneumothorax or left-sided pleural effusion.      Impression    IMPRESSION: Left-sided opacities may represent pneumonia.    I have personally reviewed the examination and initial interpretation  and I agree with the findings.    SAMI EVANS MD         SYSTEM ID:  [id number]        Labs:  Recent Labs   Lab 12/03/23  0659 12/03/23  0635 12/02/23  1547 12/02/23 0240 12/01/23 2038 12/01/23  1634   WBC  --  7.1 14.7* 13.8*  --   --    HGB  --  9.3* 11.2* 11.1*  --   --    MCV  --  99 95 90  --   --    PLT  --  138* 140* 132*  --   --    INR 1.45*  --   --  1.40*  --  1.39*   NA  --  147*  --  148* 149*  --    POTASSIUM  --  3.7  --  3.6 4.3  4.3  --    CHLORIDE  --  109*  --  107 107  --    CO2  --  21*  --  28 28  --    BUN  --  34.6*  --  36.2* 37.6*  --    CR  --  1.19*  --  1.37* 1.42*  --    ANIONGAP  --  17*  --  13 14  --    BESS  --  9.1  --  8.9 9.2  --    GLC  --  90  --  125* 124*  --    ALBUMIN  --  3.2*  --  3.4*  --   --    PROTTOTAL  --  5.9*  --  6.0*  --   --    BILITOTAL  --  0.7  --  0.9  --   --    ALKPHOS  --  80  --  102  --   --    ALT  --  9  --  12  --   --    AST  --  29  --  31  --   --       GLUCOSE:   Recent Labs   Lab 12/03/23  0635 12/02/23  0240 12/01/23 2038 12/01/23  1550 12/01/23  0355 11/30/23 2058   GLC 90 125* 124* 97 104* 107*

## 2023-12-03 NOTE — PLAN OF CARE
[id number]:  NEURO: A&Ox4.  RESPIRATORY: Lungs coarse, inspiratory wheezes. ALEXANDER. Currently on 3L NC, sating mid-upper 90s. O2 sats drop with activity.  CARDIO: AVSS. Paced at 60 bpm. Noted underlying aflutter, consulted with telemetry techs.  GI/: Continues with loose stools, up to commode x3 since 1300. Coello removed at 1740. No spontaneous urine output yet.  SKIN: R thoracotomy site dressing CDI. R CT site with small dried drainage.   ACTIVITY: Up with assist x1 using personal walker.  PAIN: Denies pain. Declined scheduled tylenol, but would like a dose before bed at 2100.  LINES: L PIV TKO.  PLAN: Pt needs sputum sample.  Monitor for first spontaneous urine output since coello removal.  Hold bowel meds, monitor stool output.  Notify team with changes/concerns.  Plan for TCU placement.

## 2023-12-03 NOTE — PROGRESS NOTES
1900 - 0700:    D: 83 year old female with PMH of HFpEF, severe TR, permanent AF s/p AVN ablation with PPM implant 1/11/19 s/p extraction TV (transvenous) PPM and implant leadless PPM 7/31/23, emergent aortic dissection repair 1/4/19, HTN, CKD, NEDA, and obesity who underwent right minithoracotomy tricuspid valve replacement with 31mm [employer] EPIC porcine bioprosthetic valve & PFO closure on 11/28/23 by Dr. Mabry.      I: Monitored vitals and assessed pt status.   PRN: Albuterol x1    Neuro: A/Ox4. Anxious at times. Anxiety resolved with therapeutic communication and reassurance.  Cardiac: Paced at 60 bpm. Bps normotensive, SBP goal <140, PRN Hydralazine available.  Respiratory: Sating >95% on 2L NC. Tachypneic at times. ALEXANDER. Dry cough, no productive sputum. SOB increases with anxiety.  GI/: Voids spontaenously. Bladder scanned, < 50 ml.Encouraging oral intake. Pt had 2 watery stools this shift.  Diet: Low saturated fat diet. Poor appetite.  Skin: R thoracotomy site dressing CDI. R/L old CT sites with dried drainage.   LDAs: PIV x1 TKO.  Activity: SBA with walker up to bedside commode.  Pain: Endorses pain at thoracotomy/R chest tube site, relieved by scheduled Tylenol.      A: VSS. Afebrile.    P: Continue to monitor Pt status and report changes to treatment team.

## 2023-12-03 NOTE — PROVIDER NOTIFICATION
Dr Matthew paged re: patient's SOB, wheezing and feelings of anxiety. O2 sats WNL on 1-2 L, no increased need for O2. Hgb stable this evening compared to yesterday.  Chest xray and VBG ordered.  Per Dr Matthew's request, discussed w/RT.  Pt had neb at 1615. RT agrees with xray & VBG orders.  Pt feeling anxious. Discussed with MD whether pt could have something for anxiety.    Addendum:  Notified MD that patient takes 40mg torsemide at home, not taking here. Last lasix inpatient was on Friday. Noted that patient hasn't had much urine today.

## 2023-12-03 NOTE — PROGRESS NOTES
Pain Service Progress Note  [facility]  Date: 12/02/2023       Patient Name: Priya Funez  MRN: [id number]  Age: 83 year old  Sex: female      Assessment:  Priya Funez is a 83 year old female who has PMH of  HFpEF, severe TR, AF s/p AVN ablation with PPM (leadless placed 7/31/23), aortic dissection s/p emergent repair on Jan. 2019, HTN, NEDA, CKD. Underwent replacement of tricuspid valve via mini-thoracotomy approach by Dr. Mabry on 11/28/23.    Interval Events:  No acute overnight events. Patient now on NC. Chest tubes were removed. Coumadin started yesterday evening, with plan for paravertebral catheter removal today, per institutional protocol.    Procedure:  replacement of tricuspid valve via mini-thoracotomy    Date of Surgery: 11/28/23    Date of Catheter Placement: 11/28/23    Plan/Recommendations:  1. Regional Anesthesia/Analgesia  -Continuous Catheter Type/Site: right paravertebral (PV) T4-5  Infusate: 0.2% Ropivacane     Programmed Intermittent Bolus (PIB) at 7 mL Q60 min via each catheter, total infusion rate of 7 mL/hr     Catheter removed at bedside at 1030AM, per primary team and patient request. Tip intact.      2. Anticoagulation  - Ok to resume heparin 500U subcutaneous TID, and coumadin tonight. Please contact Inpatient Pain Service before ordering or making any anticoagulation changes     3. Multimodal Analgesia  - per primary service     Pain Service will sign off.     Parisa Horvath MD  12/02/2023     Overnight Events: No acute overnight events    Tubes/Drains:   Chest tubes removed    Subjective:  no pain    Symptoms of LAST: No    Pain Location:  chest    Pain Intensity:    Pain at Rest: 0/10   Pain with activities: 0-4/10    Diet: Low Saturated Fat Na <2400 mg    Relevant Labs:  Recent Labs   Lab Test 11/30/23  0339 11/29/23  0342 11/28/23 2005   INR  --   --  1.44*   *   < > 215   PTT  --   --  34   BUN 34.4*   < > 25.0*    < > = values in this  "interval not displayed.       Physical Exam:  Vitals: /54   Pulse 63   Temp 97.5  F (36.4  C) (Axillary)   Resp 21   Ht 1.613 m (5' 3.5\")   Wt 69.2 kg (152 lb 8.9 oz)   SpO2 99%   BMI 26.60 kg/m      Physical Exam:   Orientation:  Alert, oriented, and in no acute distress: Yes  Sedation: No    Motor Examination:  5/5 Strength in lower extremities: Yes      Catheter Site:   Catheter entry site is clean/dry/intact: Yes    Tender: No      Relevant Medications:  Current Pain Medications:  Medications related to Pain Management (From now, onward)      Start     Dose/Rate Route Frequency Ordered Stop    12/01/23 0000  acetaminophen (TYLENOL) tablet 650 mg         650 mg Oral EVERY 4 HOURS PRN 11/28/23 1921      11/30/23 1000  acetaminophen (TYLENOL) tablet 650 mg        See Hyperspace for full Linked Orders Report.    650 mg Oral EVERY 4 HOURS 11/30/23 0943      11/30/23 1000  acetaminophen (TYLENOL) Suppository 650 mg        See Hyperspace for full Linked Orders Report.    650 mg Rectal EVERY 4 HOURS 11/30/23 0943      11/30/23 0940  HYDROmorphone (DILAUDID) injection 0.2 mg        See Hyperspace for full Linked Orders Report.    0.2 mg Intravenous EVERY 2 HOURS PRN 11/30/23 0943      11/30/23 0940  HYDROmorphone (DILAUDID) injection 0.4 mg        See Hyperspace for full Linked Orders Report.    0.4 mg Intravenous EVERY 2 HOURS PRN 11/30/23 0943      11/29/23 1100  aspirin (ASA) Suppository 300 mg         300 mg Rectal DAILY 11/29/23 1046      11/29/23 0800  polyethylene glycol (MIRALAX) Packet 17 g         17 g Oral DAILY 11/28/23 1921 11/28/23 2000  senna-docusate (SENOKOT-S/PERICOLACE) 8.6-50 MG per tablet 1 tablet         1 tablet Oral 2 TIMES DAILY 11/28/23 1921 11/28/23 1921  methocarbamol (ROBAXIN) tablet 500 mg         500 mg Oral EVERY 8 HOURS PRN 11/28/23 1921 11/28/23 1921  magnesium hydroxide (MILK OF MAGNESIA) suspension 30 mL         30 mL Oral DAILY PRN 11/28/23 1921      " "11/28/23 1921  bisacodyl (DULCOLAX) suppository 10 mg         10 mg Rectal DAILY PRN 11/28/23 1921      11/28/23 1921  oxyCODONE IR (ROXICODONE) half-tab 2.5 mg        See Hyperspace for full Linked Orders Report.    2.5 mg Oral EVERY 4 HOURS PRN 11/28/23 1921      11/28/23 1921  oxyCODONE (ROXICODONE) tablet 5 mg        See Hyperspace for full Linked Orders Report.    5 mg Oral EVERY 4 HOURS PRN 11/28/23 1921      11/28/23 1921  lidocaine 1 % 0.1-1 mL         0.1-1 mL Other EVERY 1 HOUR PRN 11/28/23 1921      11/28/23 1921  lidocaine (LMX4) cream          Topical EVERY 1 HOUR PRN 11/28/23 1921      11/28/23 1300  ROPivacaine 0.2% in NS perineural infusion (simple)          Perineural Continuous Nerve Block 11/28/23 1249              Primary Service Contacted with Recommendations? Yes      Acute Inpatient Pain Service [facility]  Hours of pain coverage 24/7   Page via Amcom- Please Page the Pain Team Via Amcom: \"PAIN MANAGEMENT ACUTE INPATIENT/ [facility]\"       "

## 2023-12-04 NOTE — PROGRESS NOTES
Pt has cpt ordered QID. RT initiated treatment and found that HR increased from 60 bpm to 120 bpm. Anterior lobes were treated and therapy was stopped. Provider was informed of tachycardia and requested treatment attempted again with RN at bedside to confirm. Treatment was attempted with bedside RN checking blood pressure and manual pulse. Tachycardia was confirmed with HR increasing to 120 bpm, but BP did not appear to be affected. Again, only upper lobes were treated due to side effects. RT waiting for provider discussion of whether treatment to be continued.  Arleth Slater, RRT

## 2023-12-04 NOTE — PROGRESS NOTES
"Reevaluated patient with PGY-2 in light of recent VBG.     Patient was tripoding in room, with continued increased WOB, patient alert and oriented x4.     Will start BIPAP for patient. Discussed indications for BIPAP with patient who is in agreeance. Additionally, discussed with patient potential need for intubation in which she agrees to if it becomes indicated.     Discussed with fellow.     Nathaniel \"Maikel\" Shravan CASTILLO  General Surgery Resident  Please page [facility]  "

## 2023-12-04 NOTE — PLAN OF CARE
[id number]:  NEURO: A&Ox4. Forgetful at times. Anxious.  RESPIRATORY: Lungs coarse, inspiratory wheezes. ALXEANDER. Currently on 1L NC, sating mid 90s. O2 sats drop with activity.  See previous note regarding increased SOB, anxiety.  Chest xray, VBG completed.  Wt up over 3 lbs today.  Lasix 20mg IV administered.  One-time dose Atarax ordered for anxiety. Pt declines for now, may request later.  CARDIO: AVSS. Paced at 60 bpm, underlying aflutter.  GI/: Positive for Cdiff. Loose stools continue, but have decreased today.  Urine output decreased today. Pt's wt up over 3 lbs. After lasix dose, large urine output in pull-up.  SKIN: R thoracotomy site dressing CDI.  ACTIVITY: Up with assist x1 using personal walker.  PAIN: Denies pain. Tylenol x1 midday.  LINES: L PIV TKO.  PLAN: Still needs sputum sample.  Monitor respiratory status, I&Os.  Notify team with changes/concerns.  Plan for TCU placement.

## 2023-12-04 NOTE — PROGRESS NOTES
1900 - 0700:     D: 83 year old female with PMH of HFpEF, severe TR, permanent AF s/p AVN ablation with PPM implant 1/11/19 s/p extraction TV (transvenous) PPM and implant leadless PPM 7/31/23, emergent aortic dissection repair 1/4/19, HTN, CKD, NEDA, and obesity who underwent right minithoracotomy tricuspid valve replacement with 31mm [employer] EPIC porcine bioprosthetic valve & PFO closure on 11/28/23 by Dr. Mabry.      I: Monitored vitals and assessed pt status.   PRN: Albuterol x1, Hydralazine x1, Atarax x1, Labetalol x1     Neuro: A/Ox4. Anxious.  Cardiac: Paced at 60 bpm. SBP goal <140, given Hydralazine and Labetalol x1.  Respiratory: 3L NC, started on BiPAP. Tachypneic. See provider notification and surgery note. ALEXANDER. Dry cough, no productive sputum.   GI/: Voids spontaneously international up to the bedside commode. Given Lasix x1. Encouraging oral intake. Pt is having stools, improving. BM x1.  Diet: Low saturated fat diet. Poor appetite.  Skin: R thoracotomy site dressing CDI. R/L old CT sites with dried drainage.   LDAs: PIV x1 TKO.  Activity: SBA with walker up to bedside commode.  Pain: Denies.     A: VSS. Afebrile.     P: Continue to monitor Pt status and report changes to treatment team.

## 2023-12-04 NOTE — PROGRESS NOTES
Antimicrobial Stewardship Team Note    Antimicrobial Stewardship Program - A joint venture between [facility] and  Physicians to optimize antibiotic management.  NOT a formal consult - Restricted Antimicrobial Review     Patient: Priya Funez  MRN: [id number]  Allergies: Bactrim [sulfamethoxazole-trimethoprim], Amoxicillin, and Ciprofloxacin    Brief Summary: Priya Funez is a 83 year old female with history of HFpEF, severe TR, AF s/p AVN ablation with PPM implant 1/11/19 s/p extraction TV (transvenous) PPM and implant leadless PPM on 7/31/23, emergent aortic dissection repair 1/4/19, HTN, CKD, NEDA, and obesity who was admitted on 11/28/2023 for elective right minithoracotomy tricuspid valve replacement and PFO closure. Post-operative course complicated by respiratory failure requiring intubation and vasoplegic shock, with quick resolution (extubated and off pressors). However, evening of 11/29 patient had a low-grade fever concerning for aspiration pneumonia in the setting of increasing O2 needs up to 45 LPM via HFNC on 11/30 and leukocytosis, started on empiric Zosyn. All other vitals within normal limits. CXR without significant change in small pleural effusions and bibasilar atelectasis/edema. Plan to complete 10 days of empiric Zosyn. Nares MRSA PCR test negative. Procalcitonin 2.01 and Scr 1.37 (baseline 1.2) on 12/2. Repeat CXR today shows slightly decreased multifocal streaky pulmonary opacities bilaterally with stable trace right pleural effusion.    Patient was noted to have multiple episodes of loose bowel movements on 12/2, decreased on 12/3 per RN notes. C diff PCR test  positive and negative antigen and toxin on 12/3. Vitals stable with improvement in oxygen needs and down trending leukocytosis. No abdominal imaging. Patient is started on oral vancomycin planned for prolonged duration (8 weeks) with pulse followed by taper for possible CDI. Of note, C diff PCR was positive with  negative antigen and toxin on 9/20.          Active Anti-infective Medications   (From admission, onward)                 Start     Stop    01/01/24 0900  vancomycin  125 mg,   Oral,   EVERY OTHER DAY        Clostridioides difficile      See Hyperspace for full Linked Orders Report.    01/29/24 0859    12/25/23 0800  vancomycin  125 mg,   Oral,   DAILY        Clostridioides difficile      See Hyperspace for full Linked Orders Report.    01/01/24 0759    12/18/23 0800  vancomycin  125 mg,   Oral,   2 TIMES DAILY        Clostridioides difficile      See Hyperspace for full Linked Orders Report.    12/25/23 0759    12/04/23 0800  vancomycin  125 mg,   Oral,   4 TIMES DAILY        Clostridioides difficile      See Hyperspace for full Linked Orders Report.    12/18/23 0759    11/30/23 0830  piperacillin-tazobactam  3.375 g,   Intravenous,   EVERY 6 HOURS        Hospital-Acquired Pneumonia       12/10/23 1359                  Assessment: Suspected aspiration pneumonia and C. Diff infection   Patient is currently on day 5 of empiric Zosyn for presumed aspiration pneumonia due to increases oxygen needs in the setting of leukocytosis. Patient has remained afebrile with improving oxygen needs, currently on 3L of oxygen via NC. Leukocytosis has resolved and IMAN improving. Procalcitonin level is not reliable in the setting of renal dysfunction though it it is now down trending with improvement in renal function. We recommend adjusting current duration of Zosyn to 7 days (instead of planned 10 days) given clinical improvement and stability.   Patient is started on oral vancomycin for treatment of C diff infection given positive C diff PCR test. C diff antigen and toxin are negative suggesting colonization and no active infection (see [facility] C diff Testing and Interpretation guide below). Patient was noted to have multiple loose stools without reports of watery diarrhea that improved before initiation of oral  vancomycin further suggesting C diff colonization. No leukocytosis or changes in vitals suggesting active infection. Per chart review, patient had first positive PCR test on 9/20/2023 with all previous tests being negative. No previous C diff treatment for first occurrence (PO vancomycin x10 days) thus prolonged duration of 8 weeks is not yet indicated in this patient. We recommend discontinuing oral vancomycin given lack of evidence for C diff infection.    Recommendations:  Discontinue oral vancomycin   Adjust stop date of Zosyn to 12/6 to complete a total duration of 7 days     Pharmacy took the following actions: Electronic note created, Called/paged provider.    Discussed with ID Staff: MD Tanna Atwood, PharmD, BCIDP  Pager: [phone]        [facility] [place] C diff testing and interpretation: [email or web address]    Vital Signs/Clinical Features:  Vitals         12/02 0700  12/03 0659 12/03 0700  12/04 0659 12/04 0700  12/04 1444   Most Recent      Temp ( F) 96.8 -  97.6    97.8 -  98.5      97.4     97.4 (36.3) 12/04 0803    Pulse 60 -  78    60 -  92    60 -  90     90 12/04 1211    Resp 13 -  35    19 -  40       22 12/04 0132    BP 81/47 -  164/80    99/59 -  154/72    137/44 -  151/77     151/77 12/04 1211    SpO2 (%) 86 -  100    89 -  100    96 -  100     97 12/04 1432            Labs  Estimated Creatinine Clearance: 41 mL/min (A) (based on SCr of 1.03 mg/dL (H)).  Recent Labs   Lab Test 12/01/23  0355 12/01/23  1550 12/01/23  2038 12/02/23  0240 12/03/23  0635 12/04/23  0118   CR 1.39* 1.38* 1.42* 1.37* 1.19* 1.03*       Recent Labs   Lab Test 01/04/19  1400 01/04/19  1633 01/11/19  0903 01/12/19  0115  01/17/19  0530 01/18/19  0502 01/27/19  0430 01/28/19  0405 01/29/19  0420 04/04/19  1644 07/15/20  0929 11/30/23  0339 12/01/23  0355 12/02/23  0240 12/02/23  1547 12/03/23  0635 12/03/23  1547 12/04/23  0118   WBC 17.7*   < > 15.6*   < > 14.9*   < > 6.6 5.9   < > 6.7   < > 18.3* 12.4* 13.8* 14.7* 7.1  --  8.9   ANEU 14.7*  --  12.6*  --  13.4*  --  5.4 4.8  --  4.3  --   --   --   --   --   --   --   --    ALYM 1.7  --  0.6*  --  0.6*  --  0.6* 0.5*  --  1.4  --   --   --   --   --   --   --   --    OSCAR 1.2  --  1.9*  --  0.7  --  0.4 0.3  --  0.5  --   --   --   --   --   --   --   --    AEOS 0.1  --  0.3  --  0.0  --  0.1 0.3  --  0.4  --   --   --   --   --   --   --   --    HGB 13.2   < > 8.2*   < > 7.6*   < > 7.4* 7.4*   < > 9.1*   < > 10.5* 11.1* 11.1* 11.2* 9.3* 11.1* 11.7   HCT 39.7   < > 24.1*   < > 22.5*   < > 22.8* 23.3*   < > 31.7*   < > 32.9* 35.9 34.7* 36.8 32.2*  --  39.2   MCV 90   < > 85   < > 88   < > 90 92   < > 86   < > 91 94 90 95 99  --  96      < > 186   < > 187   < > 298 279   < > 339   < > 142* 109* 132* 140* 138*  --  142*    < > = values in this interval not displayed.       Recent Labs   Lab Test 11/29/23  1510 11/30/23  0339 12/01/23  0355 12/01/23  1550 12/02/23  0240 12/03/23  0635   BILITOTAL 0.9 0.7 0.9 0.9 0.9 0.7   ALKPHOS 80 84 90 88 102 80   ALBUMIN 3.5 3.3* 3.4* 3.3* 3.4* 3.2*   AST 69* 57* 39 34 31 29   ALT 23 18 12 10 12 9       Recent Labs   Lab Test 01/11/19  0515 01/12/19  0115 01/13/19  0600 01/24/19  1403 08/29/23  1042 11/29/23  0959 11/29/23  1219 11/29/23  1511 11/29/23  1818 11/30/23  0815 12/02/23  0240 12/04/23  0118 12/04/23  1128   PCAL 2.02  --  3.72 1.04  --   --   --   --   --   --  2.01* 1.17* 1.06*   LACT  --    < > 1.2  --    < > 1.9 1.8 1.3 1.1 1.4  --  1.7  --     < > = values in this interval not displayed.       Recent Labs   Lab Test 01/16/19  0520   VANCOMYCIN 18.7       Culture Results:  7-Day Micro Results       Procedure Component Value  Units Date/Time    Respiratory Aerobic Bacterial Culture with Gram Stain [[id number]] Collected: 12/04/23 1400    Order Status: Sent Lab Status: In process Updated: 12/04/23 1413    Specimen: Sputum from Expectorate     C. difficile Toxin B PCR with reflex to C. difficile Antigen and Toxins A/B EIA [[id number]]  (Abnormal) Collected: 12/03/23 0933    Order Status: Completed Lab Status: Final result Updated: 12/03/23 1133    Specimen: Stool from Per Rectum      C Difficile Toxin B by PCR Positive     Comment: Detection of C. difficile nucleic acid in stools confirms the presence of these organisms in diarrheal patients but may not indicate that C. difficile is the etiologic agent of the diarrhea. Results from the Xpert C. difficile assay should be interpreted in conjunction with other laboratory and clinical data available to the clinician.    Patients with a positive C. difficile PCR result will receive reflex GDH/Toxin Immunoassay testing. Please interpret the PCR test result in conjunction with GDH/Toxin Immunoassay results and the clinical status of patient.       Narrative:      The [employer] Xpert C. difficile Assay, performed on the [employer]  Instrument Systems, is a qualitative in vitro diagnostic test for rapid detection of toxin B gene sequences from unformed (liquid or soft) stool specimens collected from patients suspected of having Clostridioides difficile infection (CDI). The test utilizes automated real-time polymerase chain reaction (PCR) to detect toxin gene sequences associated with toxin producing C. difficile. The Xpert C. difficile Assay is intended as an aid in the diagnosis of CDI.    C. difficile Antigen and Toxins A/B by Enzyme Immunoassay [[id number]]  (Normal) Collected: 12/03/23 0933    Order Status: Completed Lab Status: Final result Updated: 12/03/23 1239    Specimen: Stool from Per Rectum      C. difficile GDH Antigen Negative     C. difficile Toxin Negative    Narrative:       C. difficile GDH antigen and C. difficile toxin were not detected by enzyme immunoassay. Results must be interpreted based on clinical findings and are not supportive for diagnosis of C. difficile infection.    Respiratory Aerobic Bacterial Culture with Gram Stain     Order Status: Sent Lab Status: No result     Specimen: Sputum from Oropharynx     MRSA MSSA PCR, Nasal Swab [[id number]] Collected: 11/30/23 1613    Order Status: Completed Lab Status: Final result Updated: 11/30/23 1810    Specimen: Swab from Nose      MRSA Target DNA Negative     SA Target DNA Negative    Narrative:      The [employer]rientation  Xpert SA Nasal Complete assay performed in the [employer] System is a qualitative in vitro diagnostic test designed for rapid detection of Staphylococcus aureus (SA) and methicillin-resistant Staphylococcus aureus (MRSA) from nasal swabs in patients at risk for nasal colonization. The test utilizes automated real-time polymerase chain reaction (PCR) to detect MRSA/SA DNA. The Xpert SA Nasal Complete assay is intended to aid in the prevention and control of MRSA/SA infections in healthcare settings. The assay is not intended to diagnose, guide or monitor treatment for MRSA/SA infections, or provide results of susceptibility to methicillin. A negative result does not preclude MRSA/SA nasal colonization.     Respiratory Aerobic Bacterial Culture with Gram Stain     Order Status: Sent Lab Status: No result     Specimen: Sputum from Oropharynx             Recent Labs   Lab Test 01/24/19  2322 04/04/19  1730 09/05/23  1337 09/19/23  1526 09/27/23  1022 11/07/23  1105   URINEPH 5.5 6.0 7.0 6.0 6.0 6.5   NITRITE Negative Positive* Negative Negative Negative Negative   LEUKEST Large* Large* Small* Negative Trace* Trace*   WBCU 36* 37* 25-50*  --  2 1                   Recent Labs   Lab Test 12/03/23  0933   CDBPCT Positive*       Imaging: Echocardiogram Limited    Result Date: 12/4/2023  [id number]  [id number]^JULIA^TIFFANIE^VAL  [facility] [facility] [address] [place] [address]  Name: CEM MORRIS MRN: [id number] : 1940 Study Date: 2023 10:32 AM Age: 83 yrs Gender: Female Patient Location: [facility] [place] Reason For Study: Tricuspid Valve Replacement Ordering Physician: TIFFANIE DUMONT Performed By: Yaz Kaplan RDCS  BSA: 1.7 m2 Height: 63 in Weight: 158 lb ______________________________________________________________________________ Procedure Limited Portable Echo Adult. ______________________________________________________________________________ Interpretation Summary S/P Right minithoracotomy tricuspid valve replacement with 31mm St. Mert EPIC porcine bioprosthetic valve, PFO closure on 2023. TV mean gradient 5mmHg. Heart rate 60BPM. No TR. No pericardial effusion is present. ______________________________________________________________________________ Left Ventricle S/P Right minithoracotomy tricuspid valve replacement with 31mm St. Mert EPIC porcine bioprosthetic valve, PFO closure on 2023. Global and regional left ventricular function is normal with an EF of 60-65%.  Right Ventricle Right ventricular function, chamber size, wall motion, and thickness are normal.  Tricuspid Valve TV mean gradient 5mmHg. Heart rate 60BPM. No TR.  Vessels The inferior vena cava is normal.  Pericardium No pericardial effusion is present. ______________________________________________________________________________ MMode/2D Measurements & Calculations IVSd: 0.75 cm LVIDd: 4.6 cm LVIDs: 2.6 cm LVPWd: 0.84 cm FS: 44.4 % LV mass(C)d: 117.8 grams LV mass(C)dI: 67.4 grams/m2 RWT: 0.36  Doppler Measurements & Calculations TV V2 max: 138.6 cm/sec TV max P.1 mmHg TV mean P.0 mmHg TV V2 VTI: 39.1 cm  ______________________________________________________________________________ Report approved by: Nestor Parra 2023 12:29 PM        XR Chest Port 1 View    Result Date: 12/4/2023  Exam: XR CHEST PORT 1 VIEW, 12/4/2023 9:07 AM Comparison: Radiograph 12/4/2023, 12/3/2023, 12/2/2023 History: concern for worsening respiratory status Findings: Portable AP view of the chest. Stable postsurgical changes of the chest of intact median sternotomy wires and mediastinal clips. Aortic valve replacement. Loop recorder.  Trachea is midline. Cardiomediastinal silhouette is stable. Stable elevation of the right hemidiaphragm. Slightly decreased multifocal streaky pulmonary opacities bilaterally. Stable trace right pleural effusion. No pneumothorax.     Impression: Slightly decreased multifocal streaky pulmonary opacities bilaterally with stable trace right pleural effusion. I have personally reviewed the examination and initial interpretation and I agree with the findings. NASEEM RUEDA MD   SYSTEM ID:  [id number]    XR Chest Port 1 View    Result Date: 12/4/2023  Exam: XR CHEST PORT 1 VIEW, 12/4/2023 12:30 AM Comparison: 12/3/2023 History: increasing SOB, is the effusion continuing to increase? Findings: Single AP portable semiupright view of the chest. Median sternotomy wires. Aortic valve replacement. Trachea is midline. Stable cardiomediastinal silhouette. Stable elevation of the right hemidiaphragm. Perihilar and bibasilar mixed opacities. Left upper lung mixed opacities. No pneumothorax. Small right basilar effusion and small left apical effusion appears similar. The upper abdomen is unremarkable.     Impression: 1. Increasing perihilar and bibasilar opacities represent increased atelectasis, edema. 2. Grossly stable right pleural effusion and atelectasis. I have personally reviewed the examination and initial interpretation and I agree with the findings. JESSICA GARCIA DO   SYSTEM ID:  [id number]    XR Chest Port 1 View    Result Date: 12/3/2023  Exam: XR CHEST PORT 1 VIEW, 12/3/2023 5:53 PM Indication: concern for worsening SOB, fatigue from work of  breathing Comparison: 12/3/2023 Findings: Single portable AP view of the chest. Stable post surgical changes of the chest. Trachea is midline. No pneumothorax. Stable diffuse opacities predominantly of the left lung. Stable elevation of right hemidiaphragm with likely small amount of right pleural fluid. Stable cardiac silhouette and mediastinum.     Impression: 1. Increased small right pleural effusion. 2. Stable pulmonary opacities and post surgical changes of the chest. I have personally reviewed the examination and initial interpretation and I agree with the findings. DAVY TORRES MD   SYSTEM ID:  [id number]    XR Chest Port 1 View    Result Date: 12/3/2023  EXAM: Chest radiograph 12/3/2023 8:36 AM HISTORY: 83 years Female s/p mini tv repair. COMPARISON: Chest x-ray 12/2/2023. TECHNIQUE: Portable AP view of the chest. FINDINGS: Postsurgical changes of the chest with intact median sternotomy wires. With this pacemaker device. Tricuspid valve prosthesis. Trachea is approximately midline. Stable cardiac silhouette size. Stable right elevation of the hemidiaphragm. There is adjacent streaky bibasilar atelectasis prominently on the right. Diffuse left lung patchy opacities, similar to previous exam. There is no pneumothorax. Blunting of the right costophrenic angle. The visualized upper abdomen is unremarkable. No acute or suspicious osseous and abnormality. Soft tissues are unremarkable.     IMPRESSION: 1.  Diffuse interstitial opacities on the left are slightly improved compared to previous exam, may represent improving atelectasis versus resolving infection. 2.  Stable right-sided pleural effusion versus pleural-based scarring. I have personally reviewed the examination and initial interpretation and I agree with the findings. DAVY TORRES MD   SYSTEM ID:  [id number]    XR Chest Port 1 View    Result Date: 12/2/2023  EXAM: Chest radiograph 12/2/2023 11:43 AM HISTORY: Concern for pneumonia. COMPARISON: Chest  radiograph 12/1/2023. CT 2/3/2023. TECHNIQUE: Portable AP view of the chest. FINDINGS: Postsurgical chest with intact sternotomy wires. Removal of a right IJ sheath. Tricuspid valve prosthesis. Leadless pacemaker device. Unchanged right hemidiaphragmatic elevation with associated streaky basilar platelike atelectasis. The trachea is midline and the cardiac size is stable. Patchy opacities in the left lung are similar to the most recent comparison radiograph however worsened from 11/30/2023. There is no pneumothorax or left-sided pleural effusion.     IMPRESSION: Left-sided opacities may represent pneumonia. I have personally reviewed the examination and initial interpretation and I agree with the findings. SAMI EVANS MD   SYSTEM ID:  [id number]    XR Chest Port 1 View    Result Date: 12/2/2023  EXAM: Chest radiograph 12/1/2023 8:50 PM HISTORY: 83 years Female Chest tube removal. COMPARISON: Chest x-ray 12/1/2023 at 00:30 TECHNIQUE: Portable AP view of the chest. FINDINGS: Post surgical changes of the chest with intact median sternotomy wires. Right IJ sheath in the mid SVC. Stable tricuspid valve prosthesis, and leadless pacemaker. Interval removal of the right chest tube. Stable enlarged cardiac silhouette size. Trachea is approximately midline. Stable small pleural effusions. No pneumothorax. Ongoing diffuse interstitial and bibasilar opacities. Elevation of the right hemidiaphragm.  Visualized upper abdomen is unremarkable. No acute osseous abnormality.     IMPRESSION: 1.  Interval removal of the right chest tubes. No pneumothorax. Other support devices as above. 2.  Minimally progressed left basilar consolidation. May represent developing infection versus atelectasis.. I have personally reviewed the examination and initial interpretation and I agree with the findings. SAMI EVANS MD   SYSTEM ID:  [id number]    Cardiac Device Check - Inpatient    Result Date: 12/1/2023  Patient seen in [facility] 4A for evaluation  and iterative programming of a Medtronic Micra leadless pacemaker per MD orders. Device: Medtronic [id number] Micra VR TCP Normal Device Function Mode: VVIR  bpm : 99.8% Intrinsic Rhythm:  30 bpm Short V-V intervals: 0 Electrode impedance trend appears stable Estimated battery longevity to RRT = 5.2 years./Battery voltage = 2.99 V. Setting Changes: Settings returned to pre-admission values; LRL decreased from 80 bpm to 60 bpm. Plan: Page device RN as needed while inpatient. BALJINDER Jaramillo RN Leadless pacemaker I have reviewed and interpreted the device interrogation, settings, programming and nurse's summary. The device is functioning within normal device parameters. I agree with the current findings, assessment and plan.    XR Chest Port 1 View    Result Date: 12/1/2023  Exam: XR CHEST PORT 1 VIEW, 12/1/2023 12:31 AM Indication: Daily CXR in ICU Comparison: 11/30/2023 Findings: Portable frontal view of the chest. Postsurgical changes of the chest with intact median sternotomy wires. Right IJ sheath in the mid SVC. Stable tricuspid valve prosthesis, leadless pacemaker, right chest tubes. Stable enlarged cardiac silhouette. Similar small pleural effusions. No pneumothorax. Increased left basilar consolidation. Ongoing diffuse interstitial and right basilar opacities.     Impression: 1. Stable support devices. 2. Increased  left basilar consolidation, possibly infection versus atelectasis. Additional bilateral diffuse opacities typical of atelectasis/edema. I have personally reviewed the examination and initial interpretation and I agree with the findings. JESSICA GARCIA DO   SYSTEM ID:  [id number]    XR Chest Port 1 View    Result Date: 11/30/2023  Portable chest 11/30/2023 at 0804 hours INDICATION: Hypoxia COMPARISON: 11/30/2023 at 0543 hours Findings: Heart is mildly enlarged. Lead left pacemaker again noted. Median sternotomy again present. Large bore right IJ approach catheter tip in the SVC. Right thoracostomy  tube again present. No definitive pneumothorax. Haziness in the right lower lung field is similar to minimally increased. Right hemidiaphragm remains mildly elevated.     IMPRESSION: Continued elevation right hemidiaphragm with slight increased edema or atelectasis at the right lung base. No definitive pneumothorax. NASEEM RUEDA MD   SYSTEM ID:  [id number]    XR Chest Port 1 View    Result Date: 11/30/2023  Exam: XR CHEST PORT 1 VIEW, 11/30/2023 5:45 AM Indication: Daily CXR in ICU Comparison: 11/29/2023 Findings: Portable frontal view of the chest. Postsurgical changes of the chest with intact median sternotomy wires. Right IJ sheath in the high SVC. Stable tricuspid valve prosthesis, leadless pacemaker, right-sided chest tubes. Stable mediastinal silhouette. Small pleural effusions, similar to prior. No appreciable pneumothorax. Low lung volumes with persistent bibasilar opacities. No new airspace opacity.     Impression: 1. Stable support devices. 2. No significant change in small pleural effusions and bibasilar atelectasis/edema. I have personally reviewed the examination and initial interpretation and I agree with the findings. GLADYS DENNIS MD   SYSTEM ID:  [id number]    Cardiac Device Check - Inpatient    Result Date: 11/29/2023  Patient seen on 4A for evaluation and iterative programming of her Medtronic [id number] Micra VR TCP leadless pacemaker per MD orders. Device: Medtronic [id number] Micra VR TCP Normal Device Function Mode: VOO 80 bpm : 100% Intrinsic Rhythm: ventricular rate ~ 46 bpm Electrode impedance trend appears stable: yes Estimated battery longevity to RRT = 4.6 years Setting Changes: Pacing mode programmed from VOO to VVIR. When LRL decreased to 60 bpm patient's MAP dropped  requiring increase in pressors. LRL programmed back to 80 bpm per KRISTI Medeiros for CVTS orders. Plan: Device follow-up every 3 months. EMIL Maldonado RN Leadless pacemaker I have reviewed and interpreted the device  interrogation, settings, programming and nurse's summary. The device is functioning within normal device parameters. I agree with the current findings, assessment and plan.    XR Chest Port 1 View    Result Date: 11/29/2023  Exam: XR CHEST PORT 1 VIEW, 11/29/2023 1:10 AM Indication: Post Op CVTS Surgery Comparison: 11/28/2023 Findings: Portable frontal view of the chest. Postsurgical changes of the chest with intact median sternotomy wires. Right IJ sheath tip in the mid SVC with removal of catheter component. Stable right-sided chest tubes. Stable tricuspid valve prosthesis and leadless pacemaker. Endotracheal tube has been removed. Stable enlarged cardiac silhouette. Elevation of right hemidiaphragm with similar small pleural effusion. No appreciable pneumothorax. Increased bilateral diffuse interstitial opacities and right greater than left basilar streaky opacities.     Impression: 1. Endotracheal tube and right IJ central venous catheter have been removed. Right IJ sheath and right-sided chest tubes are stable. 2. Increased bilateral pulmonary opacities, likely worsening pulmonary edema and atelectasis. I have personally reviewed the examination and initial interpretation and I agree with the findings. MIC NUR MD (Joe)   SYSTEM ID:  [id number]    XR Chest Port 1 View    Result Date: 11/29/2023  Exam: XR CHEST PORT 1 VIEW  11/29/2023 6:44 AM History:  Respiratory acidosis, s/p tricuspid repair following extubation, lookining for possible pneumothorax   Comparison:  Earlier same day chest radiograph Findings: Single view of the chest. Postsurgical changes of the chest with intact median sternotomy wires. Right IJ sheath in the mid SVC. Stable tricuspid valve prosthesis, leadless pacemaker, right-sided chest tubes. Stable cardiomediastinal silhouette. Stable small right pleural effusion. Elevated right hemidiaphragm. No appreciable pneumothorax. Increase in streaky left and dense right basilar  opacities.     Impression: 1. No appreciable pneumothorax. 2. Increased bibasilar opacities favored to represent worsening edema/atelectasis. 3. Stable small right pleural effusion. I have personally reviewed the examination and initial interpretation and I agree with the findings. MIC NUR MD (Joe)   SYSTEM ID:  [id number]    Cardiac Device Check - Inpatient    Result Date: 11/28/2023  Patient seen on [facility]'s 3C for evaluation and iterative programming of their pacemaker per MD orders. Pre-op TVR. Device: Medtronic [id number] Micra VR TCP Normal device function. Mode: VOO 80 bpm : 100% Intrinsic Rhythm:  @ 30 bpm. No ventricular escape rhythm Short V-V intervals: Lead Trends Appear Stable. Estimated battery longevity to RRT = 5.4 years. Battery voltage = 3 V.  Setting Changes: VVIR  bpm to VOO 80 bpm Plan: Page pacemaker nurse for post-op programming.. ESA Monae RN Leadless pacemaker I have reviewed and interpreted the device interrogation, settings, programming and nurse's summary. The device is functioning within normal device parameters. I agree with the current findings, assessment and plan.    XR Chest Port 1 View    Result Date: 11/28/2023  Portable chest INDICATION: Postoperative CVTS surgery COMPARISON: 8/1/2023 FINDINGS: Heart upper normal size. Median sternotomy again noted. Interval placement of large bore right IJ catheter with tip in right atrium. Endotracheal tube tip approximately 2.2 cm above the keegan. Lead pacemaker again noted. Subsegmental atelectasis in the left lower lung zone. Slightly increased interstitial markings in the right lung suggesting mild edema or atelectasis. Interval tricuspid valve replacement.     IMPRESSION: Interval tricuspid valve replacement with mild edema in the lungs likely related to typical postoperative status. Support devices as described. NASEEM RUEDA MD   SYSTEM ID:  [id number]    ALEX with Report    Result Date: 11/28/2023  Glo Samaniego  MD Chyna     11/28/2023  7:40 PM Perioperative ALEX Procedure Note Staff -      Anesthesiologist:  Glo Samaniego MD      Resident/Fellow: Nilson Estrada MD      Performed By: fellow Preanesthesia Checklist:  Patient identified, IV assessed, risks and benefits discussed, monitors and equipment assessed, procedure being performed at surgeon's request and anesthesia consent obtained. ALEX Probe Insertion Probe Status PRE Insertion: NO obvious damage Probe type:  Adult 3D Bite block used:   Soft Insertion Technique: Easy, no oropharyngeal manipulation Insertion complications: None obvious Billing Report:ALEX report by Anesthesiologist (See Separate Report note) Probe Status POST Removal: NO obvious damage ALEX Report General Procedure Information Images for this study have been archived. Modalities: 2D, 3D, CW Doppler, PW Doppler and Color flow mapping Echocardiographic and Doppler Measurements Right Ventricle:  Cavity size dilated.    Hypertrophy not present.   Thrombus not present.    Global function mildly impaired.   Left Ventricle:  Cavity size normal.    Hypertrophy not present.   Thrombus not present.   Global Function normal.   Ventricular Regional Function: 1- Basal Anteroseptal:  normal 2- Basal Anterior:  normal 3- Basal Anterolateral:  normal 4- Basal Inferolateral:  normal 5- Basal Inferior:  normal 6- Basal Inferoseptal:  normal 7- Mid Anteroseptal:  normal 8- Mid Anterior:  normal 9- Mid Anterolateral:  normal 10- Mid Inferolateral:  normal 11- Mid Inferior:  normal 12- Mid Inferoseptal:  normal 13- Apical Anterior:  normal 14- Apical Lateral:  normal 15- Apical Inferior:  normal 16- Apical Septal:  normal 17- [place]:  normal Valves Aortic Valve: Annulus normal.  Stenosis not present.  Regurgitation absent.  Leaflets normal.  Leaflet motions normal.  Mitral Valve: Annulus normal.  Stenosis not present.  Regurgitation +1.  Leaflets normal.  Leaflet motions normal.  Tricuspid Valve: Annulus normal.   Stenosis not present.  Regurgitation +4.  Leaflets normal.  Pulmonic Valve: Annulus normal.  Stenosis not present.  Regurgitation absent.  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 Aorta: Ascending Aorta: Plaque thickness less than 3 mm.  Mobile plaque not present.  Aortic Arch: Size normal.     Plaque thickness less than 3 mm.   Mobile plaque not present.  Descending Aorta: Size normal.    Dissection present.   Plaque thickness less than 3 mm.   Mobile plaque not present.  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   Spontaneous echo contrast not present.   Thrombus not present.   Tumor not present.   Device not present.   Left Atrium: Size normal.  Spontaneous echo contrast not present.  Thrombus not present.  Tumor not present.  Device not present.  Left atrial appendage normal.  Atrial Septum: Intra-atrial septal morphology contains patent foramen ovale.   Patent foramen ovale shunts left to right.    Ventricular Septum: Intra-ventricular septum morphology normal.    Other Findings: Pericardium:  normal. Pleural Effusion:  none. Pulmonary Arteries:  normal. Pulmonary Venous Flow:  normal.   Post Intervention Findings Procedure(s) performed:  Tricuspid Valve Repair/Replace. Global function:  Unchanged. Regional wall motion: Unchanged. Surgeon(s) notified of all postintervention findings: Yes.  Tricuspid valve: Valve replaced with bioprosthetic valve. No Paravalvular leak.         Post Intervention comments: Post bypass: LV function unchanged. No new RWMA. RV showing slight reduction in function from baseline post bypass but improving on epi drip. New bioprosthetic valve in the tricuspid position that is well seated without paravalvular leaks. Gradient 3. The pulmonic, tricuspid, and aortic valves are unchanged. The aorta has no  new dissection on ECHO and has a stable thrombus and false lumen from previous dissection.. Echocardiogram Comments    POC US Guidance Needle Placement    Result Date: 11/28/2023  Paravertebral catheter placement     Paravertebral catheter placement

## 2023-12-04 NOTE — PROGRESS NOTES
D: Pt presented as an outpatient for elective TVR. Patient is now s/p mini TVR on 11/28/2023 with Dr. Mabry.      I/A:   Neuro: A&O x4.   *New order for Atarax PRN   Cardiac: Paced rhythm, underlying a flutter, HR 60s  *IV Lasix x1  *Hydralazine given x1 for /83, which was effective   Respiratory: Sats >90% on 3L NC. Patient removed from BiPap at 1215, tolerating NC but still using BiPap as needed. Order to use BiPap when sleeping.    GI/: Adequate UOP, unable to measure amount as patient is incontinent and refuses a purewick, despite encouragement and education. BM x1 this shift. Fair appetite.   IV/Drips: PIV x1  Activity: Stand by assist with gait belt and walker, slow but steady gait, up in recliner for a few hours today.        P: Rehab when med ready. Notifying CVTS team of changes.

## 2023-12-04 NOTE — PROGRESS NOTES
Care Management Follow Up    Length of Stay (days): 6    Expected Discharge Date: 12/07/2023       Concerns to be Addressed: Discharge planning.   Patient plan of care discussed at interdisciplinary rounds: Yes    Anticipated Discharge Disposition:  Transitional Care Facility.      Anticipated Discharge Services:  Transitional Care Facility Placement.   Anticipated Discharge DME:  NA    Patient/family educated on Medicare website which has current facility and service quality ratings:    Education Provided on the Discharge Plan:  No..   Patient/Family in Agreement with the Plan:  Yes.     Referrals Placed by CM/SW:  None at this time.   Private pay costs discussed: Not applicable    Additional Information: Social work is following for discharge planning. In morning discharge rounds, provider states that patient is anticipated to be medically ready to discharge in 2-3 days pending improved respiratory status and antibiotic plan. TCU facility preferences in Social Work note from 12/1. Social work will send referrals to TCU facilities closer to when patient is medically ready to discharge.    Angella FRANK, SERENITY Arguelles  Covering 6C Phone: [phone]  [facility]   Social Work & Care Management Department

## 2023-12-04 NOTE — PROGRESS NOTES
Cardiovascular Surgery Progress Note  12/04/2023         Assessment and Plan:     Priya Funez is a 83 year old female with a PMH of HFpEF, severe TR, permanent AF s/p AVN ablation with PPM implant 1/11/19 s/p extraction TV (transvenous) PPM and implant leadless PPM 7/31/23, emergent aortic dissection repair 1/4/19, HTN, CKD, NEDA, and obesity who presented as an outpatient for elective TVR. Patient is now s/p mini TVR on 11/28/2023 with Dr. Mabry.    Cardiovascular:   Hx of severe TR s/p TVR  HFpEF  Hx of Afib s/p AVN ablation - PPM, upgraded to leadless 7/2023  Hypertension  Hyperlipidemia  Hx of aortic dissection repair 2019  No arrhythmias overnight, hypertensive overnight requiring hydralazine X1, paced rhythm.  Pre-op 9/8/23 echo: LVEF 55/60%, mildly dilated RV, normal function  Echo 12/4: LVEF 60-65%, normal RV function, TV mean gradient 5 mmHg, no TR  - ASA 81 mg daily  - Metoprolol 12.5 mg BID (PTA 25 mg daily) - will hold at this dose, suspect anxiety largely contributory to hypertension and late AM meds to morning hypertension  - Device RN adjusted PPM to PTA settings rate 60   - PTA spironolactone 25 mg daily  - Holding PTA jardiance  - Diuresis as below    Chest tubes: removed in ICU  TPW: removed in ICU    Pulmonary:  Acute hypercapnic and hypoxic respiratory failure requiring BiPAP, improving  Aspiration pneumonitis vs pneumonia  Possible aspiration event 12/1  Extubated POD 0 to 4 lpm via NC. Placed on BiPAP early 12/4 AM for respiratory distress, suspect component of pulmonary edema and anxiety are contributory. Attempting wean to nasal cannula today.  - Supplemental O2 PRN to keep sats > 92%. Wean off as tolerated.  - BiPAP overnight ordered  - Pulm hygiene, IS, activity and deep breathing  - Chest physiotherapy & flutter valve QID  - Duonebs Q4 hours while awake  - CXR 12/3 improving    Neurology / MSK:  Acute post-operative pain   Hx of general anxiety disorder  Pain well controlled with  current regimen:  - PRN Acetaminophen, oxycodone, IV dilaudid, methocarbamol  - Pain catheter removed 12/2  - PTA Lexapro and Remeron  - PRN atarax added     / Renal / Fluid / Electrolytes:  CKD stage 3  Lactic acidosis, resolved  Hypokalemia  BL creat ~1.2-1.3, most recent creatinine 1.03 (trending down); adequate UOP.   FLUID STATUS: Pre-op weight 156, weight today 157 lbs  24 hour fluid status: inaccurate with multiple unmeasured voids, clinically hypervolemic with lower extremity swelling and evidence of pulmonary edema  - Diuresis: Received 60 mg IV Lasix yesterday, dosing 30 mg IV Lasix BID on 12/4/23  - PTA spironolactone 25 mg daily  - PTA torsemide has been on hold  - Replete lytes per protocol, potassium recheck ordered 12/4 PM  - Strict I/O, daily weights  - Avoid/limit nephrotoxins as able    GI / Nutrition:   Multiple loose BMs  Hx of C. Difficile  - Tolerating regular diet  - SLP recommended regular diet with thin liquids. Patient with history of dysphagia, possible aspiration event on 12/1  - Many loose bowel movements    Endocrine:  Stress induced hyperglycemia  T2DM  Hgb A1c 5.7%  - Managed on insulin drip postop, transitioned to sliding scale goal BG <180 and has now also been discontinued due to good BG control with no insulin need.   - PTA Jardiance on hold    Infectious Disease:  Stress induced leukocytosis  Aspiration pneumonitis vs pneumonia  WBC 8.9, remains afebrile  - Completed perioperative antibiotics  - Zosyn since 11/30 for pneumonia. Per discussion with pharmacy, should cover aspiration pathogens.  - Sputum culture never collected, requested again 12/4  - Procal 2.01, now trending down to 1.17 on 12/4  - Continue to monitor fever curve, CBC    Hx of recent C. Difficile  - C. Difficile toxin PCR positive 12/3 with negative antigen and negative toxin - per antimicrobial stewardship team, more likely to be colonization with recent history and no apparent treatment  - If clinically with  "persistent diarrhea or abdominal symptoms, would have low threshold to start PO vancomycin    Hematology:   Acute blood loss anemia and thrombocytopenia  Hgb stable; Plt 142, no signs or symptoms of active bleeding  - CBC daily    Anticoagulation:   Chronic anticoagulation for atrial fibrillation  - ASA  - PTA coumadin for afib, goal INR goal 2-3, INR 1.57    MSK/Skin:  Mini-thoracotomy  Rib fracture  Surgical incision  - Mini-thoracotomy precautions  - Incisional cares per protocol    Prophylaxis:   - Stress ulcer prophylaxis: Pantoprazole 40 mg daily for 30 days  - DVT prophylaxis: Subcutaneous heparin, SCD    Disposition:   - Transferred to  on 12/2  - Therapies recommending discharge to TCU, pending antibiotic plan and improved respiratory status.    Dr. Mabry has been informed of changes through written communication.      Tayla Borrero PA-C  Cardiothoracic Surgery  Pager [phone]    8:19 AM December 4, 2023        Interval History:   Overnight patient had increased work of breathing and was started on BiPAP by cross-cover.   This morning she feels breathing is stable. She is alert and oriented during our conversation. Reports continued cough.   Tolerating diet, is passing flatus, multiple loose BMs. No nausea or vomiting. Denies any abdominal pain  Denies chest pain, palpitations, dizziness, syncopal symptoms, fevers, chills, myalgias, or sternal popping/clicking.         Physical Exam:   Vital signs:  Temp: 97.4  F (36.3  C) Temp src: Axillary BP: (!) 151/77 Pulse: 90   Resp: 22 SpO2: 97 % O2 Device: BiPAP/CPAP Oxygen Delivery: 3 LPM Height: 161.3 cm (5' 3.5\") Weight: 71.4 kg (157 lb 8 oz)  Estimated body mass index is 27.46 kg/m  as calculated from the following:    Height as of this encounter: 1.613 m (5' 3.5\").    Weight as of this encounter: 71.4 kg (157 lb 8 oz).    Gen: A&Ox4, NAD  Neuro: no focal deficits   CV: RRR, normal S1 S2, no murmurs, rubs or gallops.   Pulm: Lungs sounds diminished in " bases, crackles over left lung field, mild tachypnea on BiPAP  Abd: nondistended, normal BS, soft, nontender  Ext: Mild peripheral edema of bilateral lower extremities  Skin:   Right mini thoracotomy: clean, dry, intact, no erythema  Sternal incision: clean, dry, intact, no erythema  Tubes/drain sites: c/d/I, left open to air  Groin incision: c/d/I, healing ridge present, no erythema, no fluctuance         Data:    Imaging:  reviewed recent imaging, no acute concerns, echo pending    Recent Results (from the past 24 hour(s))   XR Chest Port 1 View    Narrative    Exam: XR CHEST PORT 1 VIEW, 12/3/2023 5:53 PM    Indication: concern for worsening SOB, fatigue from work of breathing    Comparison: 12/3/2023    Findings:   Single portable AP view of the chest. Stable post surgical changes of  the chest. Trachea is midline. No pneumothorax. Stable diffuse  opacities predominantly of the left lung. Stable elevation of right  hemidiaphragm with likely small amount of right pleural fluid. Stable  cardiac silhouette and mediastinum.      Impression    Impression:   1. Increased small right pleural effusion.  2. Stable pulmonary opacities and post surgical changes of the chest.    I have personally reviewed the examination and initial interpretation  and I agree with the findings.    DAVY TORRES MD         SYSTEM ID:  [id number]   XR Chest Port 1 View    Narrative    Exam: XR CHEST PORT 1 VIEW, 12/4/2023 12:30 AM    Comparison: 12/3/2023    History: increasing SOB, is the effusion continuing to increase?    Findings:  Single AP portable semiupright view of the chest. Median sternotomy  wires. Aortic valve replacement.    Trachea is midline. Stable cardiomediastinal silhouette. Stable  elevation of the right hemidiaphragm. Perihilar and bibasilar mixed  opacities. Left upper lung mixed opacities. No pneumothorax. Small  right basilar effusion and small left apical effusion appears similar.  The upper abdomen is  unremarkable.      Impression    Impression:   1. Increasing perihilar and bibasilar opacities represent increased  atelectasis, edema.  2. Grossly stable right pleural effusion and atelectasis.    I have personally reviewed the examination and initial interpretation  and I agree with the findings.    JESSICA GARCIA DO         SYSTEM ID:  [id number]   XR Chest Port 1 View    Narrative    Exam: XR CHEST PORT 1 VIEW, 2023 9:07 AM    Comparison: Radiograph 2023, 12/3/2023, 2023    History: concern for worsening respiratory status    Findings:  Portable AP view of the chest. Stable postsurgical changes of the  chest of intact median sternotomy wires and mediastinal clips. Aortic  valve replacement. Loop recorder.      Trachea is midline. Cardiomediastinal silhouette is stable. Stable  elevation of the right hemidiaphragm. Slightly decreased multifocal  streaky pulmonary opacities bilaterally. Stable trace right pleural  effusion. No pneumothorax.      Impression    Impression:   Slightly decreased multifocal streaky pulmonary opacities bilaterally  with stable trace right pleural effusion.    I have personally reviewed the examination and initial interpretation  and I agree with the findings.    NASEEM RUEDA MD         SYSTEM ID:  [id number]   Echocardiogram Limited   Result Value    LVEF  60-65%    Narrative    [id number]  [id number]  [id number]  [id number]^JULIA^TIFFANIE^VAL     [facility],[place]  Echocardiography Laboratory  [address] [place] [id number]     Name: CEM MORRIS  MRN: [id number]  : 1940  Study Date: 2023 10:32 AM  Age: 83 yrs  Gender: Female  Patient Location: [facility]  Reason For Study: Tricuspid Valve Replacement  Ordering Physician: TIFFANIE DUMONT  Performed By: Yaz Kaplan RDCS     BSA: 1.7 m2  Height: 63 in  Weight: 158 lb  ______________________________________________________________________________  Procedure  Limited  Portable Echo Adult.  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  TV mean gradient 5mmHg. Heart rate 60BPM. No TR.  No pericardial effusion is present.  ______________________________________________________________________________  Left Ventricle  S/P Right minithoracotomy tricuspid valve replacement with 31mm St. Mert EPIC  porcine bioprosthetic valve, PFO closure on 2023. Global and regional  left ventricular function is normal with an EF of 60-65%.     Right Ventricle  Right ventricular function, chamber size, wall motion, and thickness are  normal.     Tricuspid Valve  TV mean gradient 5mmHg. Heart rate 60BPM. No TR.     Vessels  The inferior vena cava is normal.     Pericardium  No pericardial effusion is present.  ______________________________________________________________________________  MMode/2D Measurements & Calculations  IVSd: 0.75 cm  LVIDd: 4.6 cm  LVIDs: 2.6 cm  LVPWd: 0.84 cm  FS: 44.4 %  LV mass(C)d: 117.8 grams  LV mass(C)dI: 67.4 grams/m2  RWT: 0.36     Doppler Measurements & Calculations  TV V2 max: 138.6 cm/sec  TV max P.1 mmHg  TV mean P.0 mmHg  TV V2 VTI: 39.1 cm     ______________________________________________________________________________  Report approved by: Nestor Parra 2023 12:29 PM              Labs:  Recent Labs   Lab 23  0918 23  0118 23  1547 23  0659 23  0635 23  1547 23  0240   WBC  --  8.9  --   --  7.1 14.7* 13.8*   HGB  --  11.7 11.1*  --  9.3* 11.2* 11.1*   MCV  --  96  --   --  99 95 90   PLT  --  142*  --   --  138* 140* 132*   INR 1.57*  --   --  1.45*  --   --  1.40*   NA  --  144  --   --  147*  --  148*   POTASSIUM 4.1 3.3*  --   --  3.7  --  3.6   CHLORIDE  --  105  --   --  109*  --  107   CO2  --  25  --    --  21*  --  28   BUN  --  29.2*  --   --  34.6*  --  36.2*   CR  --  1.03*  --   --  1.19*  --  1.37*   ANIONGAP  --  14  --   --  17*  --  13   BESS  --  8.8  --   --  9.1  --  8.9   GLC  --  127*  --   --  90  --  125*   ALBUMIN  --   --   --   --  3.2*  --  3.4*   PROTTOTAL  --   --   --   --  5.9*  --  6.0*   BILITOTAL  --   --   --   --  0.7  --  0.9   ALKPHOS  --   --   --   --  80  --  102   ALT  --   --   --   --  9  --  12   AST  --   --   --   --  29  --  31      GLUCOSE:   Recent Labs   Lab 12/04/23  0118 12/03/23  0635 12/02/23  0240 12/01/23  2038 12/01/23  1550 12/01/23  0355   * 90 125* 124* 97 104*

## 2023-12-04 NOTE — PROGRESS NOTES
Patient started on BiPAP due to increased WOB in the setting of significant anxiety and a slightly elevated CO2 on VBG.  Patient is alert, appropriate, and tolerating NIV well at this time.  ABG to be collected.

## 2023-12-04 NOTE — PROGRESS NOTES
"Surgery Brief Note  LAST PROCEDURE: Right thoracotomy on cardiopulmonary bypass. Minimally Invasive Tricuspid Valve replacement using a 31mm Abbott Epic Plus Mitral Valve.  Intraoperative Transesophageal Echocardiogram (ALEX) per Anesthesia.:   patent foramen ovale closure: 11/28/2023    The prior cross cover intern was paged regarding symptomatic SOB in this patient. She reported no desaturations.     Evaluated patient at bedside with bedside RN. Patient weight has increased ~ 5 pounds today. She reports SOB with mildly increased WOB. Chest xray demonstrates slight fluid overload with small increased R pleural effusion. Lung sounds are slightly course in all four posterior quadrants. RRR to PP and monitor. Patient reports significant anxiety that she thinks is also playing a role into this.     Vital Signs: Most Recent  Ranges (24 hours)   Temp: 97.8  F (36.6  C)  Pulse: 60  BP: 123/54  Resp: (!) 32  SpO2: 96 % on Nasal cannula Temp  Min: 96.8  F (36  C)  Max: 98.3  F (36.8  C)  Pulse  Min: 60  Max: 87  BP  Min: 106/78  Max: 154/72  Resp  Min: 20  Max: 32  SpO2  Min: 86 %  Max: 100 %     Plan:  Discussed case with fellow  20mg of IV lasix  One time atarax PRN overnight for anxiety.     Please page with further questions or concerns  Nathaniel \"Maikel\" Shravan CASTILLO  General Surgery Resident  Please page on call resident through [facility]  "

## 2023-12-04 NOTE — PROVIDER NOTIFICATION
23:30: MARSHALLI Pt's /63. Given PRN hydralazine. Pt's BP went down to 125/49 (72) after 20 minutes, now refusing to wear BP cuff for rechecking.    23:53: FYI, pt's RR around late 30s-40s RPM, very labored breathing. States at times she has trouble catching her breath. Sating @ 94% on 3L. Restless at times, given PRN atarax and albuterol. Trudy STOKES [phone]

## 2023-12-04 NOTE — PROGRESS NOTES
"Surgery Brief Note  LAST PROCEDURE: Right thoracotomy on cardiopulmonary bypass. Minimally Invasive Tricuspid Valve replacement using a 31mm Abbott Epic Plus Mitral Valve.  Intraoperative Transesophageal Echocardiogram (ALEX) per Anesthesia.:   patent foramen ovale closure: 11/28/2023    Paged by bedside RN \"FYI, pt's RR around late 30s-40s RPM, very labored breathing. States at times she has trouble catching her breath. Sating @ 94% on 3L. Restless at times, given PRN atarax and albuterol.\"    Evaluated patient at bedside. Increased WOB. Denies chest pain, nausea, vomiting, fevers, chills, dizziness, and lightheadedness. Patient extremely anxious in the room during my exam. RR~32 during my exam, but notably decreased to 24-26 ~10 minutes later. Discussed with bedside RN and called RT to reassess patient. RT believes this is likely 2/2 anxiety. RRR to PP. Patient had recently received hydralazine and albuterol which may contribute to HR increase from prior exam. RR decreased to 24-26 while doing breathing exercises for anxiety with RT.     Vital Signs: Most Recent  Ranges (24 hours)   Temp: 98.1  F (36.7  C)  Pulse: 92  BP: 125/49  Resp: (!) 40  SpO2: 100 % on Nasal cannula Temp  Min: 97.1  F (36.2  C)  Max: 98.5  F (36.9  C)  Pulse  Min: 60  Max: 92  BP  Min: 106/78  Max: 154/72  Resp  Min: 19  Max: 40  SpO2  Min: 89 %  Max: 100 %     Plan:  Repeat chest xray and VBG  Cbc, lytes    Discussed with PGY-2    Please page with further questions or concerns  Nathaniel \"Maikel\" Shravan CASTILLO  General Surgery Resident  Please page on call resident through [facility]  "

## 2023-12-05 NOTE — PROGRESS NOTES
D: Pt presented as an outpatient for elective TVR. Patient is now s/p mini TVR on 11/28/2023 with Dr. Mabry.      I/A:   Neuro: A&O x4.   Cardiac: Paced rhythm, underlying a flutter, HR 60s  *New order for IV Lasix BID  Respiratory: Sats >90% on 1-2L NC, using BiPap as needed. Infrequent cough, occasionally productive, sputum sample sent to lab.   GI/: Adequate UOP, getting up to bedside commode and ambulating to the bathroom more today but still intermittently incontinent. UA ordered, UC pending. BM x1 this shift, still loose. Good appetite.   IV/Drips: PIV x1  Activity: Stand by assist with gait belt and walker, slow but steady gait, up in recliner for a few hours today.         P: Rehab when med ready. Notifying CVTS team of changes.

## 2023-12-05 NOTE — PROGRESS NOTES
Cardiovascular Surgery Progress Note  12/05/2023         Assessment and Plan:     Priya Funez is a 83 year old female with a PMH of HFpEF, severe TR, permanent AF s/p AVN ablation with PPM implant 1/11/19 s/p extraction TV (transvenous) PPM and implant leadless PPM 7/31/23, emergent aortic dissection repair 1/4/19, HTN, CKD, NEDA, and obesity who presented as an outpatient for elective TVR. Patient is now s/p mini TVR on 11/28/2023 with Dr. Mabry.    Cardiovascular:   Hx of severe TR s/p TVR  HFpEF  Hx of Afib s/p AVN ablation - PPM, upgraded to leadless 7/2023  Hypertension  Hyperlipidemia  Hx of aortic dissection repair 2019  Stable paced rhythm with underlying atrial flutter. On review of cardiology, underlying aflutter/afib has been present since at least June of this year. HD stable. No hydralazine use overnight.  Pre-op 9/8/23 echo: LVEF 55/60%, mildly dilated RV, normal function  Echo 12/4: LVEF 60-65%, normal RV function, TV mean gradient 5 mmHg, no TR  - ASA 81 mg daily  - Metoprolol 12.5 mg BID (PTA 25 mg daily)  - Device RN adjusted PPM to PTA settings rate 60   - PTA spironolactone 25 mg daily  - Holding PTA jardiance  - Diuresis as below    Chest tubes: removed in ICU  TPW: removed in ICU    Pulmonary:  Acute hypercapnic and hypoxic respiratory failure requiring BiPAP, improving  Aspiration pneumonitis vs pneumonia  Possible aspiration event 12/1  Extubated POD 0 to 4 lpm via NC. Now on BiPAP overnight and ~3 LPM during the day.  - Supplemental O2 PRN to keep sats > 92%. Wean off as tolerated.  - BiPAP overnight ordered  - Pulm hygiene, IS, activity and deep breathing  - Percussive therapy & flutter valve QID  - Duonebs Q4 hours while awake  - CXR 12/5 stable    Neurology / Psych:  Acute post-operative pain   Hx of general anxiety disorder  Pain well controlled with current regimen:  - PRN Acetaminophen, oxycodone, IV dilaudid, methocarbamol  - Pain catheter removed 12/2  - PTA Lexapro and  Remeron  - PRN Atarax      / Renal / Fluid / Electrolytes:  CKD stage 3  Lactic acidosis, resolved  Hypokalemia  BL creat ~1.2-1.3, most recent creatinine 1.31 (increase from  (trending down); adequate UOP.   FLUID STATUS: Pre-op weight 156, weight today 157 lbs  24 hour fluid status: inaccurate with multiple unmeasured voids, clinically hypervolemic with lower extremity swelling and evidence of pulmonary edema  - Diuresis: Increasing to 60 mg IV Lasix BID on 12/5/23  - PTA spironolactone 25 mg daily  - PTA torsemide has been on hold  - Replete lytes per protocol  - Strict I/O, daily weights  - Avoid/limit nephrotoxins as able    GI / Nutrition:   Multiple loose BMs  Hx of C. Difficile  - Tolerating regular diet  - SLP recommended regular diet with thin liquids. Patient with history of dysphagia, possible aspiration event on 12/1  - Many loose bowel movements, improving with holding bowel regimen    Endocrine:  Stress induced hyperglycemia  T2DM  Hgb A1c 5.7%  - Managed on insulin drip postop, transitioned to sliding scale goal BG <180 and has now also been discontinued due to good BG control with no insulin need.   - PTA Jardiance on hold, look to resume in coming days    Infectious Disease:  Stress induced leukocytosis  Probable aspiration pneumonitis vs pneumonia  WBC 10.2 (trending up), remains afebrile  - Completed perioperative antibiotics  - Zosyn since 11/30 for pneumonia. Per discussion with pharmacy, should cover aspiration pathogens.  - 12/4 sputum culture with 1+ yeast, also epithelial cells - requested resend 12/5  - Procal 2.01, now trending down to 1.17 on 12/4. Ordered recheck for 12/6.  - Continue to monitor fever curve, CBC    Hx of recent C. Difficile  - C. Difficile toxin PCR positive 12/3 with negative antigen and negative toxin - per antimicrobial stewardship team, more likely to be colonization with recent history and no apparent treatment  - If clinically with persistent diarrhea or  "abdominal symptoms, would have low threshold to start PO vancomycin    Hematology:   Acute blood loss anemia and thrombocytopenia  Hgb stable; Plt 197, no signs or symptoms of active bleeding  - CBC daily    Anticoagulation:   Chronic anticoagulation for atrial fibrillation  - ASA  - PTA coumadin for afib, goal INR goal 2-3, INR 2.11    MSK/Skin:  Mini-thoracotomy  Rib fracture  Surgical incision  - Mini-thoracotomy precautions  - Incisional cares per protocol    Prophylaxis:   - Stress ulcer prophylaxis: Pantoprazole 40 mg daily for 30 days  - DVT prophylaxis: therapeutic anticoagulation as above, SCD    Disposition:   - Transferred to  on 12/2  - Therapies recommending discharge to TCU, pending antibiotic plan and improved respiratory status.    Dr. Mabry has been informed of changes through written communication.      Tayla Borrero PA-C  Cardiothoracic Surgery  Pager [phone]    7:06 AM December 5, 2023        Interval History:   Overnight patient slept better and had stable breathing from her report.  She reports continued shortness of breath but feels sure that it is improving over the past 24 hours. Cough is most prominent after breathing treatments but is also improving. Work of breathing is improved.   Tolerating diet, is passing flatus, loose Bms improving. No nausea or vomiting. Denies any abdominal pain.   Walked with therapy in the campos this morning and saturated well on 1 LPM throughout. Felt very tired but not lightheaded nor dizzy.   Denies chest pain, palpitations, dizziness, syncopal symptoms, fevers, chills, myalgias, or sternal popping/clicking.         Physical Exam:   Vital signs:  Temp: 98  F (36.7  C) Temp src: Oral BP: 128/60 Pulse: 61   Resp: 18 SpO2: 97 % O2 Device: Nasal cannula Oxygen Delivery: 3 LPM Height: 161.3 cm (5' 3.5\") Weight: 71.5 kg (157 lb 9.6 oz)  Estimated body mass index is 27.48 kg/m  as calculated from the following:    Height as of this encounter: 1.613 m (5' " "3.5\").    Weight as of this encounter: 71.5 kg (157 lb 9.6 oz).    Gen: A&Ox4, NAD  Neuro: no focal deficits   CV: RRR, normal S1 S2, no murmurs, rubs or gallops.   Pulm: Lungs sounds diminished in bases, crackles over left lung field, regular effort of breathing on 1 LPM during my exam  Abd: nondistended, normal BS, soft, nontender  Ext: Mild peripheral edema of bilateral lower extremities  Skin:   Right mini thoracotomy: clean, dry, intact, no erythema  Sternal incision: clean, dry, intact, no erythema  Tubes/drain sites: c/d/I, left open to air  Groin incision: c/d/I, healing ridge present, no erythema, no fluctuance         Data:    Imaging:  reviewed recent imaging, no acute concerns, echo pending    Recent Results (from the past 24 hour(s))   XR Chest Port 1 View    Narrative    Exam: XR CHEST PORT 1 VIEW, 12/4/2023 9:07 AM    Comparison: Radiograph 12/4/2023, 12/3/2023, 12/2/2023    History: concern for worsening respiratory status    Findings:  Portable AP view of the chest. Stable postsurgical changes of the  chest of intact median sternotomy wires and mediastinal clips. Aortic  valve replacement. Loop recorder.      Trachea is midline. Cardiomediastinal silhouette is stable. Stable  elevation of the right hemidiaphragm. Slightly decreased multifocal  streaky pulmonary opacities bilaterally. Stable trace right pleural  effusion. No pneumothorax.      Impression    Impression:   Slightly decreased multifocal streaky pulmonary opacities bilaterally  with stable trace right pleural effusion.    I have personally reviewed the examination and initial interpretation  and I agree with the findings.    NASEEM RUEDA MD         SYSTEM ID:  [id number]   Echocardiogram Limited   Result Value    LVEF  60-65%    Narrative    [id number]  [id number]  [id number]  [id number]^DROEGE^TIFFANIE^[facility],[place]  Echocardiography Laboratory  [address] [place] [id number]     Name: ALEXANDRA" CEM DAMON  MRN: [id number]  : 1940  Study Date: 2023 10:32 AM  Age: 83 yrs  Gender: Female  Patient Location: [facility]  Reason For Study: Tricuspid Valve Replacement  Ordering Physician: TIFFANIE DUMONT  Performed By: Yaz Kaplan RDCS     BSA: 1.7 m2  Height: 63 in  Weight: 158 lb  ______________________________________________________________________________  Procedure  Limited Portable Echo Adult.  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  TV mean gradient 5mmHg. Heart rate 60BPM. No TR.  No pericardial effusion is present.  ______________________________________________________________________________  Left Ventricle  S/P Right minithoracotomy tricuspid valve replacement with 31mm St. Mert EPIC  porcine bioprosthetic valve, PFO closure on 2023. Global and regional  left ventricular function is normal with an EF of 60-65%.     Right Ventricle  Right ventricular function, chamber size, wall motion, and thickness are  normal.     Tricuspid Valve  TV mean gradient 5mmHg. Heart rate 60BPM. No TR.     Vessels  The inferior vena cava is normal.     Pericardium  No pericardial effusion is present.  ______________________________________________________________________________  MMode/2D Measurements & Calculations  IVSd: 0.75 cm  LVIDd: 4.6 cm  LVIDs: 2.6 cm  LVPWd: 0.84 cm  FS: 44.4 %  LV mass(C)d: 117.8 grams  LV mass(C)dI: 67.4 grams/m2  RWT: 0.36     Doppler Measurements & Calculations  TV V2 max: 138.6 cm/sec  TV max P.1 mmHg  TV mean P.0 mmHg  TV V2 VTI: 39.1 cm     ______________________________________________________________________________  Report approved by: Nestor Parra 2023 12:29 PM              Labs:  Dakota Negron   Lab 23  1532 23  0918 23  0118  12/03/23  1547 12/03/23  0659 12/03/23  0635 12/02/23  1547 12/02/23  0240   WBC  --   --  8.9  --   --  7.1 14.7* 13.8*   HGB  --   --  11.7 11.1*  --  9.3* 11.2* 11.1*   MCV  --   --  96  --   --  99 95 90   PLT  --   --  142*  --   --  138* 140* 132*   INR  --  1.57*  --   --  1.45*  --   --  1.40*   NA  --   --  144  --   --  147*  --  148*   POTASSIUM 3.8 4.1 3.3*  --   --  3.7  --  3.6   CHLORIDE  --   --  105  --   --  109*  --  107   CO2  --   --  25  --   --  21*  --  28   BUN  --   --  29.2*  --   --  34.6*  --  36.2*   CR  --   --  1.03*  --   --  1.19*  --  1.37*   ANIONGAP  --   --  14  --   --  17*  --  13   BESS  --   --  8.8  --   --  9.1  --  8.9   GLC  --   --  127*  --   --  90  --  125*   ALBUMIN  --   --   --   --   --  3.2*  --  3.4*   PROTTOTAL  --   --   --   --   --  5.9*  --  6.0*   BILITOTAL  --   --   --   --   --  0.7  --  0.9   ALKPHOS  --   --   --   --   --  80  --  102   ALT  --   --   --   --   --  9  --  12   AST  --   --   --   --   --  29  --  31      GLUCOSE:   Recent Labs   Lab 12/04/23  0118 12/03/23  0635 12/02/23  0240 12/01/23 2038 12/01/23  1550 12/01/23  0355   * 90 125* 124* 97 104*

## 2023-12-05 NOTE — PROGRESS NOTES
Shift: 1553-1349    Neuro: A&O x 4, follows commands.  Respiratory: on BiPaP overnight, dyspnea upon exertion. 3L via NC % spo2. Productive cough intermittent.   Cardiac: VVI @100% HR 60's. BLE edema 2+ pitting.  GI/: Loose stools. Continent of bowel and bladder.   Diet: Low Na+ <2400mg.  Pain: denies pain.  IV Access: L forearm PIV.  Labs: K, Magnesium, Phosphorus.   Activity: Assist of 1 w/ walker.    New changes this shift: None.  Plan: Continue POC. Page team with any changes.

## 2023-12-05 NOTE — PROGRESS NOTES
"Care Management Follow Up    Length of Stay (days): 7    Expected Discharge Date: 12/07/2023     Concerns to be Addressed: Discharge planning.   Patient plan of care discussed at interdisciplinary rounds: Yes    Anticipated Discharge Disposition:  Transitional Care Placement.      Anticipated Discharge Services:  Transitional Care Facility Placement.    Anticipated Discharge DME:  NA    Patient/family educated on Medicare website which has current facility and service quality ratings:  Yes.   Education Provided on the Discharge Plan:  Yes  Patient/Family in Agreement with the Plan:  Yes    Referrals Placed by CM/SW:  Referrals to TCU sent today. Please see below for information regarding pending TCU referrals.   Private pay costs discussed: Not applicable    Additional Information: Per provider at rounds, patient should be medically ready to discharge in 2-3 days. Social work sent referrals to preferred TCU facilities today.  Patient identified preferences listed below in order of preference.      [facility] Main Phone Number: [phone]   Addendum 3:30 pm: Social Work received message from admissions in [facility] , referral is declined due to \"acuity is too high.\"      [facility] Admissions Phone [phone]      [facility] Phone: [phone], Admissions: [phone]     [facility]. Phone: [phone] Addendum 3:30 pm: Social Work received message from admissions in [facility] , referral is declined due to \"bed not available.\"     Sofia Talamantes  Phone: [phone]      Angella FRANK, SERENITY Arguelles  Covering [address]C Phone: [phone]  [facility]   Social Work & Care Management Department          "

## 2023-12-06 PROBLEM — Z94.4 LIVER TRANSPLANT RECIPIENT (H): Status: ACTIVE | Noted: 2023-01-01

## 2023-12-06 NOTE — PROGRESS NOTES
Priya Funez is a 83 year old female with a PMH of HFpEF, severe TR, permanent AF s/p AVN ablation with PPM implant 1/11/19 s/p extraction TV (transvenous) PPM and implant leadless PPM 7/31/23, emergent aortic dissection repair 1/4/19, HTN, CKD, NEDA, and obesity who presented as an outpatient for elective TVR. Patient is now s/p mini TVR on 11/28/2023 with Dr. Mabry.       A: 11/28   Neuro: A/Ox4. Calls appropriately. Pleasant and cooperative.   Cardiac/Tele:  Vpaced 100% c underlying aflutter.   Respiratory: 1L NC BIPAP overnight.   GI/: AUO no bm  Diet/Appetite: low sat fat   Skin: scattered bilateral arm bruising.   LDAs: LPIV- SL  Activity: SB c walker GB   Pain: back pain managed with prn tylenol and cold packet.   Changes During Shift: lab entered th pt's room without ultra sound. The pt and her daughter have Made the assumption that they will be there for every lab draw. Spoke to the daughter on the phone and she is quite flustered. I was unable to inform lab due to being busy in another room.      P: Continue to monitor and notify CVTS with changes.  TCU 2-3 days     Time of care 19:00-07:00

## 2023-12-06 NOTE — PROGRESS NOTES
Cardiovascular Surgery Progress Note  12/06/2023         Assessment and Plan:     Priya Funez is a 83 year old female with a PMH of HFpEF, severe TR, permanent AF s/p AVN ablation with PPM implant 1/11/19 s/p extraction TV (transvenous) PPM and implant leadless PPM 7/31/23, emergent aortic dissection repair 1/4/19, HTN, CKD, NEDA, and obesity who presented as an outpatient for elective TVR. Patient is now s/p mini TVR and PFO closure on 11/28/2023 with Dr. Mabry.    Addendum: RRT called ~1630 for increased O2 needs. Patient SOB with elevated WOB. O2 sats >92% on 7 L facemask. Was on 2L earlier in the day. Attempted nebulizers and Bipap. Patient did not tolerate bipap. Discussed with RRT staff and STAT ABG, CBC, CMP, Procal, CRP, BNP ordered and patient agreed to trying bipap. CT chest ordered but patient decompensated requiring 100% O2 in bipap and not maintaining saturations. Code blue was called at that time and she was intubated and transferred to the ICU.    Cardiovascular:   Hx of severe TR s/p TVR  HFpEF  Hx of Afib s/p AVN ablation - PPM, upgraded to leadless 7/2023  Hypertension  Hyperlipidemia  Hx of aortic dissection repair 2019  Stable paced rhythm at 60. HD stable  Echo 12/4: LVEF 60-65%, normal RV function, TV mean gradient 5 mmHg, no TR  - ASA 81 mg daily  - Metoprolol 25 mg BID   - PPM back to PTA settings VVIR   - PTA spironolactone 25 mg daily  - Holding PTA jardiance  - Diuresis as below    CT/PW removed in ICU    Pulmonary:  Acute hypercapnic and hypoxic respiratory failure requiring BiPAP, improving  Aspiration pneumonitis vs pneumonia  Possible aspiration event 12/1  Extubated POD 0 to 4 lpm via NC. Now on BiPAP overnight and ~2 LPM during the day.  - Supplemental O2 PRN to keep sats > 92%. Wean off as tolerated.  - BiPAP overnight   - Pulm hygiene, IS, activity and deep breathing  - Percussive therapy & flutter valve QID  - Duonebs Q4 hours while awake  - CXR 12/5 stable  -  Patient very SOB today. Difficult to speak in complete sentences. Maintaining O2 sats on 2L NC. Checking covid and respiratory panel. Scheduled albuterol and ipratropium inhalers and added mucinex. Increasing diuresis    Neurology / Psych:  Acute post-operative pain   Hx of general anxiety disorder  Pain well controlled with current regimen:  - PRN Acetaminophen, oxycodone, IV dilaudid, methocarbamol  - PTA Lexapro and Remeron     / Renal / Fluid / Electrolytes:  CKD stage 3  Lactic acidosis, resolved  Hypokalemia  BL creat ~1.2-1.3, most recent creatinine 1.31 (increase from  (trending down); adequate UOP.   FLUID STATUS: Pre-op weight 156, weight today 159 lbs  24 hour fluid status: inaccurate with multiple unmeasured voids, clinically hypervolemic with lower extremity swelling and evidence of pulmonary edema  - Diuresis: Bumex 4 mg BID  - PTA spironolactone 25 mg daily  - PTA torsemide 40 mg daily held  - Replete lytes per protocol  - Strict I/O, daily weights  - Avoid/limit nephrotoxins as able    GI / Nutrition:   Multiple loose BMs  Hx of C. Difficile  - Tolerating regular diet  - SLP recommended regular diet with thin liquids. Patient with history of dysphagia, possible aspiration event on 12/1  - Many loose bowel movements, improving with holding bowel regimen    Endocrine:  Stress induced hyperglycemia  T2DM  Hgb A1c 5.7%  - Managed on insulin drip postop, transitioned to sliding scale goal BG <180 and has now also been discontinued due to good BG control with no insulin need.   - PTA Jardiance on hold, look to resume in coming days    Infectious Disease:  Stress induced leukocytosis  Probable aspiration pneumonitis vs pneumonia  WBC 10.2 (trending up), remains afebrile  - Completed perioperative antibiotics  - Zosyn since 11/30 for pneumonia. Per discussion with pharmacy, should cover aspiration pathogens. Stopped 12/6  - 12/4 sputum culture with 1+ yeast, also epithelial cells - requested resend 12/5  -  Procal trending down. Ordered recheck for 12/6.  - Continue to monitor fever curve, CBC  - started Bactrim for UTI 12/6    Hx of recent C. Difficile  - C. Difficile toxin PCR positive 12/3 with negative antigen and negative toxin - per antimicrobial stewardship team, more likely to be colonization with recent history and no apparent treatment  - If clinically with persistent diarrhea or abdominal symptoms, would have low threshold to start PO vancomycin    Hematology:   Acute blood loss anemia and thrombocytopenia  Hgb stable; Plt stable, no signs or symptoms of active bleeding  - CBC daily    Anticoagulation:   Chronic anticoagulation for atrial fibrillation  - ASA  - PTA coumadin for afib, goal INR goal 2-3, INR pending    MSK/Skin:  Mini-thoracotomy  Rib fracture  Surgical incision  - Mini-thoracotomy precautions  - Incisional cares per protocol    Prophylaxis:   - Stress ulcer prophylaxis: Pantoprazole 40 mg daily for 30 days  - DVT prophylaxis: therapeutic anticoagulation as above, SCD    Disposition:   - Transferred to  on 12/2  - Therapies recommending discharge to TCU, pending antibiotic plan and improved respiratory status.    Dr. Mabry has been informed of changes    Karen Prasad PA-C   Cardiothoracic Surgery   Pager #[phone]  December 6, 2023 at 10:00 AM        Interval History:   Patient very SOB today. Difficult to speak in complete sentences. Maintaining O2 sats on 2L NC. Checking covid and respiratory panel. Scheduled albuterol and ipratropium inhalers and added mucinex. Increasing diuresis  Tolerating diet but not eating much, is passing flatus, loose Bms improving. No nausea or vomiting. Denies any abdominal pain.   Denies chest pain, palpitations, dizziness, syncopal symptoms, fevers, chills, myalgias, or sternal popping/clicking.         Physical Exam:   Vital signs:  Temp: 97.2  F (36.2  C) Temp src: Axillary BP: 119/56 Pulse: 60   Resp: 20 SpO2: 98 % O2 Device: Nasal cannula (PT  "REQUESTED SMALL BIPAP BREAK) Oxygen Delivery: 2 LPM Height: 161.3 cm (5' 3.5\") Weight: 72.5 kg (159 lb 12.8 oz)  Estimated body mass index is 27.86 kg/m  as calculated from the following:    Height as of this encounter: 1.613 m (5' 3.5\").    Weight as of this encounter: 72.5 kg (159 lb 12.8 oz).    Gen: A&Ox4, NAD  Neuro: no focal deficits   CV: RRR, normal S1 S2, no murmurs, rubs or gallops.   Pulm: Lungs sounds diminished in bases, crackles over left lung field, regular effort of breathing on 2 LPM during my exam  Abd: nondistended, normal BS, soft, nontender  Ext: Mild peripheral edema of bilateral lower extremities  Skin:   Right mini thoracotomy: clean, dry, intact, no erythema  Sternal incision: clean, dry, intact, no erythema  Tubes/drain sites: c/d/I, left open to air  Groin incision: c/d/I, healing ridge present, no erythema, no fluctuance         Data:    Imaging:  reviewed recent imaging, no acute concerns, echo pending    Recent Results (from the past 24 hour(s))   XR Chest Port 1 View    Narrative    Portable chest    INDICATION: Assessing lung fields    COMPARISON: Yesterday    FINDINGS: Heart is enlarged. Median sternotomy again noted. Right  hemidiaphragm remains elevated. Slight right calcific angle blunting  unchanged which may indicate small pleural effusion oral lung base  atelectasis. No ectopic air or interval consolidation. Left-sided  pulmonary opacities are unchanged. Loop recorder.      Impression    IMPRESSION: No significant changes radiographically from yesterday.    NASEEM RUEDA MD         SYSTEM ID:  [id number]        Labs:  Recent Labs   Lab 12/05/23  0855 12/04/23  1532 12/04/23  0918 12/04/23  0118 12/03/23  1547 12/03/23  0659 12/03/23  0635 12/02/23  1547 12/02/23  0240   WBC 10.2  --   --  8.9  --   --  7.1   < > 13.8*   HGB 11.6*  --   --  11.7 11.1*  --  9.3*   < > 11.1*   MCV 93  --   --  96  --   --  99   < > 90     --   --  142*  --   --  138*   < > 132*   INR " 2.11*  --  1.57*  --   --  1.45*  --   --  1.40*     --   --  144  --   --  147*  --  148*   POTASSIUM 4.1 3.8 4.1 3.3*  --   --  3.7  --  3.6   CHLORIDE 102  --   --  105  --   --  109*  --  107   CO2 26  --   --  25  --   --  21*  --  28   BUN 31.6*  --   --  29.2*  --   --  34.6*  --  36.2*   CR 1.31*  --   --  1.03*  --   --  1.19*  --  1.37*   ANIONGAP 15  --   --  14  --   --  17*  --  13   BESS 9.0  --   --  8.8  --   --  9.1  --  8.9   *  --   --  127*  --   --  90  --  125*   ALBUMIN  --   --   --   --   --   --  3.2*  --  3.4*   PROTTOTAL  --   --   --   --   --   --  5.9*  --  6.0*   BILITOTAL  --   --   --   --   --   --  0.7  --  0.9   ALKPHOS  --   --   --   --   --   --  80  --  102   ALT  --   --   --   --   --   --  9  --  12   AST  --   --   --   --   --   --  29  --  31    < > = values in this interval not displayed.      GLUCOSE:   Recent Labs   Lab 12/05/23  0855 12/04/23  0118 12/03/23  0635 12/02/23  0240 12/01/23 2038 12/01/23  1550   * 127* 90 125* 124* 97

## 2023-12-06 NOTE — CODE/RAPID RESPONSE
Rapid Response Team Note    Assessment   In assessment a rapid response was called on Priya Funez due to acute hypoxic respiratory failure. This presentation is likely due to Multifactorial     Plan   -  STAT ABG, BiPAP  - STAT CT chest w/o contrast  - STAT CBC, CMP, LA, Procal, CRP, NT-BNP,  - RVP and COVID- negative today; INR 3.71 on coumadin  - CXR 1.  Minimally increased diffuse interstitial opacities, likely  represents postsurgical edema/atelectasis. However, underlying  superimposed infectious etiology cannot be entirely ruled out.  Attention on follow-up.  2.  Postsurgical changes of the chest are stable. Support devices as  above.  3.  Small-to moderate bilateral effusions. Decreased on right.  - Resume Zosyn for possible infectious process   - Please insert coello for strict I&O  -  The  CVTS  primary team was  At bedside.  -  Disposition: The patient  TBD pending the above workup  -  Reassessment and plan follow-up will be performed by the primary team      Tavia Dacosta PA-C  [facility] RRT [facility] Job Code Contact #[phone]  [facility] Paging/Directory    Hospital Course   Brief Summary of events leading to rapid response:   RRT called for increase work of breathing and O2 requirements. Per Primary team at bedside, patient with increasing wob and O2 requirements throughout the day. Trial of BiPAP, though patient reports made sx worse. Bilateral Exp wheeze s/p neb w/o significant improvement. CXR as above. Patient reports difficulty in taking a deep breath and feeling warm. No tachycardia, though on beta blocker. No fever or chills, though complaining of feeling warm. No CP, abdominal pain, HA, vision changes, hx of DVT/PE, no cough.      Admission Diagnosis:   Tricuspid valve regurgitation [I07.1]  Patent foramen ovale [Q21.12]  Atrial fibrillation (H) [I48.91]  Tricuspid regurgitation [I07.1]    Physical Exam   Temp: 97.4  F (36.3  C) Temp  Min: 96.7  F (35.9  C)  Max: 97.7  F (36.5  C)  Resp: 24  Resp  Min: 16  Max: 29  SpO2: 90 % SpO2  Min: 90 %  Max: 100 %  Pulse: 60 Pulse  Min: 60  Max: 65    No data recorded  BP: 133/60 Systolic (24hrs), Av , Min:117 , Max:133   Diastolic (24hrs), Av, Min:48, Max:73     I/Os: I/O last 3 completed shifts:  In: 640 [P.O.:640]  Out: 400 [Urine:400]     Exam:   General: acutely ill appearing  Mental Status: AAOx4.  Resp: breathing labored on 7 L Oxymask, bilateral exp wheeze and rhonchi  CV: RRR. No appreciable M/R/G  GI: BS+, no rebound or guarding     Significant Results and Procedures   Lactic Acid:   Recent Labs   Lab Test 23  1356 23  0118 23  0815   LACT 1.3 1.7 1.4     CBC:   Recent Labs   Lab Test 23  1356 23  0855 23  0118   WBC 16.4* 10.2 8.9   HGB 10.9* 11.6* 11.7   HCT 35.8 37.5 39.2    197 142*        Sepsis Evaluation   The patient is known to have an infection.  NO EVIDENCE OF SEPSIS at this time.  Vital sign, physical exam, and lab findings are due to Possibleoher infection vs volume overload vs cardia vs other  .

## 2023-12-06 NOTE — PROGRESS NOTES
Pt has order for Midline. Per 6C RN Tamika, they need Midline for lab draw. Writer explained to 6C RN that the midline that we have is only 3F and midline doesn't guarantee blood return after few days of insertion. Per 6C RN, midline on hold for now. Writer will assist  for pt's lab draw today.

## 2023-12-06 NOTE — PROGRESS NOTES
CLINICAL NUTRITION SERVICES - ASSESSMENT NOTE     Nutrition Prescription    RECOMMENDATIONS FOR MDs/PROVIDERS TO ORDER:  -Liberalize diet order to help encourage oral intake. Await results of kcal counts (12/7-12/10)    Malnutrition Status:    Patient does not meet two of the established criteria necessary for diagnosing malnutrition but is at risk for malnutrition    Recommendations already ordered by Registered Dietitian (RD):  Prn snack/supplement order.    Future/Additional Recommendations:  -Rec self-select tolerated foods/beverages. Encourage small, frequent meals and use of oral supplements as desired.    -Order a multivitamin with minerals, if inadequate oral intake continues, to help meet micronutrient needs.   -Continue remeron which may help encourage appetite.   -Consider checking iron studies. Monitor potential need for iron supplementation.  -Monitor BG control. Hgb A1c of 5.7.   -If TF becomes nutrition plan of care, would rec place feeding tube (FT) and initiate TFs, Osmolite 1.5 (concentrated, maintenance TF formula, or equivalent) and order Prosource TF 20 modulars, 1 pkt daily. Initiate TFs at 15 mL/hr, advancing rate by 10 mL Q 8 hrs (or per provider discretion, pending hemodynamic stability) to goal rate of 45 mL/hr. Osmolite 1.5 Adan (or equivalent) @ goal of  45ml/hr  (1080ml/day)  + Prosource TF 20 modulars, 1 pkt daily, provides: 1700 kcals, 87 g PRO, 822 ml free H20, 219 g CHO, and 0 g fiber daily. If desire semi-elemental TF formula, rec Vital 1.5 Adan advanced to goal rate of  45ml/hr  (1080ml/day) + 1 pkt Prosource TF 20 daily will provide: 1700 kcals, 92 g PRO, 825 ml free H20, 201 g CHO, and 6 g fiber daily. Adjust protein modulars if needed, pending labs.      If begin tube feeds:    - Flush FT with 30 mL water Q 4 hrs for patency. Rec provider adjust free water flushes as needed, pending fluid status.   - Ensure K+/Mg++/Phos labs are ordered daily until TFs advance to goal infusion to  "evaluate for sx of refeeding with nutrition received. If lytes trend low, aggressively replace lytes. Do not advance TF greater than 30 mL/hr unless K+ is = or > 3, Mg++ is = or > 1.5, and Phos is = or > 1.9.   - If not already ordered, order a multivitamin/mineral (certavite or liquid multivitamin/minerals 15 mL/day via FT) to help ensure micronutrient needs being met with suspected hypermetabolic demands and potential interruptions to TF infusions.   - Monitor TF and possible need to adjust nutrition support regimen if necessary, pending medical course and nutrition status.       - Monitor need to check a metabolic cart study.     - Send a nutrition consult for \"Registered Dietitian to Order TF per Medical Nutrition Therapy Guidelines\" if desire RD to order TFs.       REASON FOR ASSESSMENT  Priya Funez is a/an 83 year old female assessed by the dietitian for LOS    NUTRITION/ADDITIONAL HISTORY  Per RD note 3/20/2019: \" Pt reports having moderate appetite. Reviewed nutrition goals, and highly encouraged trying ONS. Pt open to having ONS as snacks, focusing on small frequent meals instead of three larger meals. VFSS today per SLP showed mild to moderate dysphagia. Diet: regular textures, 2 Gm Na with honey thick liquids.   Fluid restriction 1000 ml/day   Tube Feeding: Isosource 1.5 @ 30 x 10 hrs, nocturnal  Supplements and Modulars: No Carb Prosource- 1 pkt two times a day, Benefiber- 1 pkt three times a day  Flush Orders: H2O- 150 ml Q 6 hrs.   Meal intake improving, % of meals eaten.   240 ml fluid intake last shift  Ordering ~ 936 kcals, 48 gm protein.  Enteral nutrition access is a percutaneous gastric tube, with a placement date of 1/25/19.   The current noc tube feeding + prosource provides 618 kcals, 50 gm proten.  Continue diet order and noc TF.  Add special K bar at breakfast meal.  Add Boost GC as mid morning snack.  Add cottage cheese and peaches as mid afternoon snack.   Monitor PO intake.If " "improves, plan to d/c TF. \"    Per H & P, \"PMH of HFpEF, severe TR, AF s/p AVN ablation with PPM (leadless placed 7/31/23), aortic dissection s/p emergent repair 1/2019, HTN, NEDA, CKD and obesity. Presents to [facility] for replacement of tricuspid valve via mini-thoracotomy approach by Dr. Mabry on 11/28/23. Stress hyperglycemia. Preop A1c 5.7.\" Pt was ordered to take, noting, vitamin D, vitamin B12, jardiance, remeron, and demadex PTA.     Per malnutrition risk screen: \"Have you recently lost weight without trying?: no. Have you been eating poorly because of a decreased appetite?: no.\"    Per pt, good appetite PTA. Eating well. Has not routinely consumed oral supplements. Dislikes milk.     CURRENT NUTRITION ORDERS  Diet: Low Saturated Fat/2400 mg Sodium since 11/29. SLP signed off 12/2.   Intake/Tolerance: Tolerating diet with fair tolerance. Per nursing flowsheets (% intake), pt consuming 0% (ate a late lunch and declined supper) on 12/2), 0% to bites of sherbet, orange, and other fruit on 12/3, 100% with a good appetite 12/4, and applesauce on 12/6. Per RN 12/2-12/3, poor appetite. Per RN 12/4, fair appetite, and now good on 12/5. Per discussion with pt, not eating well. Per pt, decreased appetite and loose stools. States her loose stools have been now improving just a little bit. Recently ate a popsicle and found that she tolerated it well. Tried to order a few items that she was interested in but these were not allowed due to her diet order. Perficient (meal ordering system) indicates food/beverages sent 12/3-12/5 totaled 490 kcals and 6 g protein daily on average. Requesting two meals daily on average    LABS  Labs reviewed    MEDICATIONS  Medications reviewed    ANTHROPOMETRICS  Height: 161.3 cm (5' 3.504\")  Most Recent Weight: 72.5 kg (159 lb 12.8 oz)    IBW: 53.4 kg   BMI: Overweight BMI 25-29.9  Weight History: 71.6 (12/13/2022), 70.7 kg (6/5/23), 69.8 kg (9/5/2023), 69.4 kg (11/7/23), 70.8 kg (11/28, " "admit), 72.5 kg (12/6) -  No significant/severe wt loss.   Dosing Weight: 57 kg (adjusted, based on lowest wt so far this admission of 69.2 kg on 12/2/23)    ASSESSED NUTRITION NEEDS (for inpatient hospital stay)  Estimated Energy Needs: 2679-2031 kcals/day (25 - 30 kcals/kg)  Justification: Maintenance  Estimated Protein Needs: 68-86 grams protein/day (1.2 - 1.5 grams of pro/kg)  Justification: Increased needs pending renal function  Estimated Fluid Needs: 2858-3966 mL/day (25 - 30 mL/kg)   Justification: Maintenance needs or per provider    PHYSICAL FINDINGS/OTHER FINDINGS  See malnutrition section below.  Resp: Extubated 11/28 (s/p TVR, PFO closure). Intermittent bipap.   GI: Having two to eight stools daily on average. Last Bowel Movement: 12/05/23. Stools are loose and brown/green. C diff PCR positive on 12/3/2023 (GDH and toxin negative). Florastor started 12/3; last received senna 11/30. Now off zosyn. Per AXR 12/6, \"No abnormally dilated air-filled loops of bowel. No pneumatosis or portal venous gas.\"  HEENT: Missing tooth/teeth  WOC: Not following at this time    MALNUTRITION  % Intake: < 75% for > 7 days (moderate)  % Weight Loss: None noted  Subcutaneous Fat Loss: None observed  Muscle Loss: None observed  Fluid Accumulation/Edema: Does not meet criteria    Malnutrition Diagnosis: Patient does not meet two of the established criteria necessary for diagnosing malnutrition but is at risk for malnutrition    NUTRITION DIAGNOSIS  Inadequate oral intake related to loose stools, decreased appetite, and food choices available on diet order as evidenced by pt consuming 0% to bites at times.       INTERVENTIONS  Implementation  Nutrition Education: Importance of adequate nutrition intake discussed with pt. Rec small, frequent meals to help increase oral intake. Gave examples of foods/beverages that are likely to be better tolerated and those that are not likely to be tolerated as well. Gave Coping with Diarrhea " handout.  Medical food supplement therapy: Offered oral nutrition supplements (ONS). Declined ONS but gave ONS handout. Placed prn snack/supplement order. Pt may request snacks/oral supplements prn.     Goals  Patient to consume % of nutritionally adequate meal trays TID, or the equivalent with supplements/snacks.     Monitoring/Evaluation  Progress toward goals will be monitored and evaluated per protocol.       Nutrition will continue to follow.      Gianna Garcia, MS, RD, LD, [facility]   6C (beds [id number] and [id number]) RD pgr: [phone]  Saturday/Sunday/Holiday RD pgr: [phone]

## 2023-12-06 NOTE — PROGRESS NOTES
"Pt refusing to use volera. She states she is \"not feeling up to it\" and is willing to try in the morning. Writer educated pt on the use of volera and the benefits it provides in bronchial hygiene. Neb was given with aerobibrooke. Care will continue    RT Janet on 12/5/2023 at 9:33 PM    "

## 2023-12-06 NOTE — PROGRESS NOTES
"Care Management Follow Up    Length of Stay (days): 8    Expected Discharge Date: 12/07/2023     Concerns to be Addressed: Discharge planning.   Patient plan of care discussed at interdisciplinary rounds: Yes    Anticipated Discharge Disposition:  Transitional Care Placement.      Anticipated Discharge Services:   Transitional Care Facility Placement.     Anticipated Discharge DME:  NA    Patient/family educated on Medicare website which has current facility and service quality ratings:  Yes.  Education Provided on the Discharge Plan:  Yes.   Patient/Family in Agreement with the Plan:      Referrals Placed by CM/SW:  Referrals to TCU facilities.   Private pay costs discussed: Not applicable    Additional Information: Social work paged CVTS PA at 12 pm to ask about an estimated discharge date. Per provider, the patient will likely not be ready to discharge until Monday.     Social work followed up with pending TCU referrals today. Updates are bellow.     [facility] Main Phone Number: [phone]  Admissions [phone]  Addendum 3:30 pm 12/5: Social Work received message from admissions in [facility] , referral is declined due to \"acuity is too high.\"   >>12/6  SW called and spoke with Odette with admissions at 8:40 am. Odette states that she initially declined the referral because they did think they could accommodate the patient's respiratory needs. Odette wants to know if the patient will discharge with a BiPap and an estimated discharge date.     [facility]. Phone: [phone] Addendum 3:30 pm: Social Work received message from admissions in [facility], referral is declined due to \"bed not available.\"  >>12/6 8:45 am SW called and left voicemail with admissions to follow up on referral, provided 6C Unit SW phone number, requested call back.     [facility]  Phone: [phone],   >>12/6 Writer received message received in [facility], patient is accepted for " admission. 8:45 SW called and left voicemail with admissions, requested call back.  PRATEEK called and spoke with with Elizabeth with admissions at 3 pm this afternoon. Elizabeth states that writer will need  to talk to Zara with [facility] to follow up direct [phone] SW called and left voicemail with Zara to follow up, requested call back.     The following facilities are not an option at this time.     [facility] Admissions Phone [phone]   >>12/6 SW called and spoke with Michelle with admissions at 8:45 am to follow up on the referral. Michelle states the referral is declined unable to meet needs due to acutiy of patient versus acuity of patients currently at the facility.      [facility] Phone: [phone]  Admissions Phone: [phone]  >>12/6 8:45 am SW called and left voicemail with admissions to follow up on referral, provided Unit 6C phone number, requested call back. Per return call from Amara with admissions, BiPap as needed, they do not take patients with BiPap, staff is not trained to take patients with BiPap.     Angella FRANK, LGSW  [facility]  Covering 6C Phone: [phone]  [facility]   Social Work & Care Management Department

## 2023-12-06 NOTE — PLAN OF CARE
Goal Outcome Evaluation:      Plan of Care Reviewed With: patient    Overall Patient Progress: no changeOverall Patient Progress: no change    Outcome Evaluation: See RD note

## 2023-12-06 NOTE — PLAN OF CARE
Shift: [id number]      I/A:  Neuro: A&O x4. Denies headache, numbness/tingling.   Cardiac: Tele in place, paced/ Aflutter. HR 60's. Afebrile  Resp: VSS on 2-4 L NC. Lung sounds diminished, with inspiratory wheezing in bilateral lungs. Infrequent weak cough. Mucinex ordered and nebs. BiPAP when sleeping.   GI/: Voiding spontaneously. No BM during shift. Denies abdominal pain. Poor appetite.   Skin: No new deficits noted  LDA's: PIV in place SL.   Pain: Sore back, ice packs help.       Patient needs ultrasound with every lab draw. It's very difficult to get ultrasound up to get these labs done and patient refuses getting poked without it. Midline ordered, however its not recommended to use midline for lab draws for the first 3 days. Please decide if the midline is still necessary.    Plan: Continue to monitor and follow POC. Notify CVTS with any changes.

## 2023-12-07 NOTE — PROCEDURES
CVICU Procedure Note     Name of Procedure: R-Sided Arterial Line Placement    Date of Procedure: December 7, 2023    Description of Procedure  Consent for the procedure was obtained from the patients daughter after its risks and benefits were thoroughly explained. The patient was placed in the supine position and her right wrist was hyperextended. The puncture site was then prepped and draped in the usual sterile fashion and 3 mL of 1% Lidocaine was infiltrated into skin and subcutaneous tissue overlying the radial artery. A 20-gauge quick cath needle was then advanced through the patient's skin and subcutaneous tissue and entered into the patient's radial artery. Strong pulsatile blood flow was immediately observed coming from the needle. A guidewire was then advanced through the catheter and the quick cath catheter was exchanged for the 12 cm catheter. The guidewire was then removed and the catheter was attached to a transducer. It was subsequently sutured into place. After cleaning the site of entry, the patient's wrist was relaxed and a sterile dressing was applied. There were not any immediate complications and the patient tolerated the procedure well.    Findings: A good waveform was observed on the monitor and the arterial line blood pressure readings matched those obtained via the blood pressure cuff.    Easton Ennis MD CA2, PGY3

## 2023-12-07 NOTE — PROGRESS NOTES
"Surgery PGY1 cross-cover note    Around 1730 I received sign-out from the day team RICH that this patient had recently had a rapid response called and was asked to follow up her VBG. At that time it had not resulted.     At 1821 I received a page about a critical troponin of 243 in this patient, at that time I was participating in other urgent patient care. At this time I also received a call from the fellow regarding this.     When the code blue was called overhead was the first notification I received of continued distress in this patient. I immediately presented to bedside alongside PGY-2 and SICU fellow. By that time there was CVTS fellow, staff, and resident present alongside code blue team. I discussed with CVTS fellow and returned to other urgent vs emergent patient care.     Documentation of this note is delayed 2/2 urgent and emergent patient care.     Nathaniel \"Maikel\" Shravan CASTILLO  General Surgery Resident  Please page on call resident through [facility]  "

## 2023-12-07 NOTE — PROGRESS NOTES
Order for PICC line. Patient has pending blood culture. Spoke with RN if we can wait for negative blood culture result first before placing the line. She will talk to MD and will call back.

## 2023-12-07 NOTE — ANESTHESIA PROCEDURE NOTES
Airway       Patient location during procedure: Floor       Procedure Start/Stop Times: 12/6/2023 6:45 PM and 12/6/2023 7:05 AM  Staff -        Anesthesiologist:  Pawel Mortensen MD       Resident/Fellow: Goldberg, Leslie, MD       Performed By: resident  Consent for Airway        Urgency: emergent       Consent: The procedure was performed in an emergent situation.  Indications and Patient Condition       Indications for airway management: altered level of consciousness and respiratory insufficiency       Mallampati: Not Assessed     Induction type:intravenous       Mask difficulty assessment: 0 - not attempted    Final Airway Details       Final airway type: endotracheal airway       Successful airway: ETT - single  Endotracheal Airway Details        ETT size (mm): 8.0       Cuffed: yes       Cuff volume (mL): 6       Successful intubation technique: video laryngoscopy       VL Blade Size: MAC 3       Grade View of Cords: 1       Adjucts: stylet       Position: Right       Measured from: gums/teeth       Secured at (cm): 22       Bite block used: None    Post intubation assessment        Placement verified by: capnometry, equal breath sounds and chest rise        Number of attempts at approach: 1       Number of other approaches attempted: 0       Secured with: commercial tube frausto       Ease of procedure: easy       Dentition: Intact and Unchanged    Medication(s) Administered   rocuronium (ZEMURON) 10 mg/mL injection - Intravenous   50 mg - 12/6/2023 6:45:00 PM  Medication Administration Time: 12/6/2023 6:45 PM    Additional Comments       Patient obtunded, intubated for respiratory insufficiency and airway protection.

## 2023-12-07 NOTE — PROGRESS NOTES
Care Management Follow Up    Length of Stay (days): 9    Expected Discharge Date: 12/08/2023     Concerns to be Addressed: all concerns addressed in this encounter     Patient plan of care discussed at interdisciplinary rounds: Yes    Anticipated Discharge Disposition:  TCU     Anticipated Discharge Services:    Anticipated Discharge DME:      Patient/family educated on Medicare website which has current facility and service quality ratings:  yes  Education Provided on the Discharge Plan:  yes  Patient/Family in Agreement with the Plan:  yes    Referrals Placed by CM/SW:    [facility] -ACCEPTED  [facility] [place]  Private pay costs discussed: Not applicable    Additional Information:  [phone] pt is accepted to their facility and can take her on Monday 12/11.  SW called and left message letting them know pt came back to ICU early this morning and will call back with details from providers about discharge planning or other needs.     PRATEEK will follow up with both CVTS team and [facility] about pt discharge readiness.    ZAC Trejo, SERENITY  4A & 4E ICU   Pager: [phone]  Phone: [phone]

## 2023-12-07 NOTE — PROCEDURES
PRE-OP DIAGNOSIS: Ventilator dependent  POST-OP Diagnosis: Same  OPERATION: Bronchoscopy, bronchioalveolar lavage  ANESTHESIA: IV sedation  STAFF: Dr. Tj Layton  RESIDENT: Easton Ennis MD  COMORBIDITIES:   Patient Active Problem List   Diagnosis    SOB (shortness of breath)    Chronic diastolic heart failure (H)    Chronic atrial fibrillation (H)    Benign essential hypertension    Chronic kidney disease, stage III (moderate) (H)    Chronic right-sided heart failure (H)    Severe tricuspid regurgitation    History of aortic dissection    NEDA (generalized anxiety disorder)    History of blood transfusion    Cardiac pacemaker in situ    Complete heart block (H)    Atrial pacemaker lead displacement    Displacement of atrial pacemaker leads, initial encounter    Thrombocytopenia (H24)    Chronic diastolic congestive heart failure (H)    C. difficile colitis    Current use of long term anticoagulation    Tricuspid regurgitation    Liver transplant recipient (H)       FINDINGS: Tracheal malacia, bowing of posterior trachea wall into lumen with respirations.    INDICATIONS:Priya Funez is a 83 year old female who has increased secretions and consolidation on imaging who is s/p respiratory arrest progressing cardiac arrest with CPR of under 2 minutes prior to ROSC.  Bronchoscopy is recommended.  Risks, benefits and alternatives were discussed and all agree to proceed with the operation as planned.    PROCEDURE: IV sedation was administered during the procedure. A bronchoscope was inserted through the patient's ETT and advanced into the trachea distal to the ETT. Normal anatomy was identified.  20cc of saline was introduced and suctioned out of the Left side. Fluid was sent for culture.  Patient tolerated the procedure well.    Easton Ennis MD, CA2, PGY 3

## 2023-12-07 NOTE — PROGRESS NOTES
Admitted/transferred from: 6C to 4A CVICU at 1920  Reason for admission/transfer: Acute Respiratory Decompensation, needed to be intubated, hemodynamically unstable:  requiring pressors  2 RN skin assessment: completed by: Tayla RN, Olga RN  Result of skin assessment and interventions/actions: Scattered bruising, old Right groin incision  Height, weight, drug calc weight: Done  Patient belongings (see Flowsheet)  MDRO education added to care plan: N/A. Patient intubated and sedated  ?

## 2023-12-07 NOTE — PROGRESS NOTES
Pt arrived to 4A intubated with 8.0 ETT secured 23 @ teeth and was placed on initial ventilator settings of CMV 18 420 100% +5    Breath sounds clear

## 2023-12-07 NOTE — PROGRESS NOTES
CV ICU H&P    ASSESSMENT: Priya Funez is a 83 year old female with a PMH of HFpEF, severe TR, permanent AF s/p AVN ablation with PPM implant 1/11/19 s/p extraction TV (transvenous) PPM and implant leadless PPM 7/31/23, emergent aortic dissection repair 1/4/19, HTN, CKD, NEDA, and obesity who presented as an outpatient for elective TVR. Patient is now s/p mini TVR on 11/28/2023 with Dr. Mabry. Transferred to floor 12/2. RRT called 12/6 for worsening hypoxia and resp distress; subsequently had respiratory arrest with short CPR prior to ROSC. Intubated and transferred back to ICU.     CO-MORBIDITIES:   Patient Active Problem List   Diagnosis    SOB (shortness of breath)    Chronic diastolic heart failure (H)    Chronic atrial fibrillation (H)    Benign essential hypertension    Chronic kidney disease, stage III (moderate) (H)    Chronic right-sided heart failure (H)    Severe tricuspid regurgitation    History of aortic dissection    NEDA (generalized anxiety disorder)    History of blood transfusion    Cardiac pacemaker in situ    Complete heart block (H)    Atrial pacemaker lead displacement    Displacement of atrial pacemaker leads, initial encounter    Thrombocytopenia (H24)    Chronic diastolic congestive heart failure (H)    C. difficile colitis    Current use of long term anticoagulation    Tricuspid regurgitation    Liver transplant recipient (H)           PLAN:  Neuro/ pain/ sedation:  - Monitor neurological status. Notify the MD for any acute changes in exam.  #Acute postoperative pain  - Fentanyl   - PRN: Tylenol, oxycodone, Dilaudid  #Sedation  - Gtt:Propofol, precedex   - Head CT w/o intracranial pathologies 12/6     Hx of general anxiety disorder  Pain well controlled with current regimen:  - PRN Acetaminophen, oxycodone, IV dilaudid, methocarbamol  - Pain catheter removed 12/2  - PTA Lexapro and Remeron  - PRN Atarax     Pulmonary care:   Mechanical ventilation  Acute hypercapnic and hypoxic  respiratory failure requiring BiPAP  Aspiration pneumonitis vs pneumonia  Possible aspiration event 12/1  Acute respiratory failure 12/6/23   Near complete plugging of left lower lobe on CT   Acute mildly displaced and nondisplaced right 3-5 rib fractures  with associated right chest wall hematoma and nondisplaced  anterolateral left fourth and fifth rib fractures  Vent Mode: CMV/AC  (Continuous Mandatory Ventilation/ Assist Control)  FiO2 (%): 100 %  Resp Rate (Set): 18 breaths/min  Tidal Volume (Set, mL): 420 mL  PEEP (cm H2O): 5 cmH2O  Resp: 18    - Pulm hygiene, IS, activity and deep breathing  - Percussive therapy & flutter valve QID  - Duonebs Q4 hours while awake  - CXR 12/5 stable  - RRT called 12/6 for worsening hypoxia and resp distress; subsequently had respiratory arrest with short CPR prior to ROSC. Intubated and transferred back to ICU.   - CXR, ETT retracted 3cm   - CXR tomorrow   - CPT, mucomyst, duoneb ordered     Cardiovascular:    Hx of severe TR s/p TVR  HFpEF  Hx of Afib s/p AVN ablation - PPM, upgraded to leadless 7/2023  Hypertension  Hyperlipidemia  Hx of aortic dissection repair 2019  # Respiratory arrest 12/6   # Elevated BNP   Stable paced rhythm with underlying atrial flutter. On review of cardiology, underlying aflutter/afib has been present since at least June of this year. HD stable. No hydralazine use overnight.  Pre-op 9/8/23 echo: LVEF 55/60%, mildly dilated RV, normal function  Echo 12/4: LVEF 60-65%, normal RV function, TV mean gradient 5 mmHg, no TR  - ASA 81 mg daily  - Holding Metoprolol 12.5 mg BID (PTA 25 mg daily)  - Device RN adjusted PPM to PTA settings rate 60   - Holding PTA spironolactone 25 mg daily  - Holding PTA jardiance  - Warfarin per CVTS   - Consider formal ECHO in AM   - Pressors: Levo 0.04   - Consider stress dose steroids if worsening pressor requirements   - BNP tomorrow     GI care / Nutrition:   Multiple loose BMs  Hx of C. Difficile  - Tolerating regular  diet  - SLP recommended regular diet with thin liquids. Patient with history of dysphagia, possible aspiration event on 12/1  - Many loose bowel movements, improving with holding bowel regimen    - NPO, bedside swallow eval once extubated  - PPI  - Bowel regimen: MiraLAX, senna  - OG tube for meds     Renal / Fluids / Electrolytes:   CKD stage 3  Lactic acidosis, resolved  Hypokalemia  IMAN on CKD   BL creat ~1.2-1.3, most recent creatinine 1.31 (increase from  (trending down); adequate UOP.   FLUID STATUS: Pre-op weight 156, weight today 157 lbs  24 hour fluid status: inaccurate with multiple unmeasured voids, clinically hypervolemic with lower extremity swelling and evidence of pulmonary edema  - HOLDING PTA spironolactone 25 mg daily  - HOLDING PTA torsemide has been on hold  - Replete lytes per protocol  - Strict I/O, daily weights  - Avoid/limit nephrotoxins as able  - Strict I/O, daily weights, avoid/limit nephrotoxins  - Replete lytes PRN per protocol    Endocrine:    Stress induced hyperglycemia  T2DM  Hgb A1c 5.7%  - Managed on insulin drip postop, transitioned to sliding scale goal BG <180 and has now also been discontinued due to good BG control with no insulin need.   - PTA Jardiance on hold, look to resume in coming days  - Goal BG <180 for optimal healing    ID / Antibiotics:  Stress induced leukocytosis  Probable aspiration pneumonitis vs pneumonia  WBC 10.2 (trending up), remains afebrile  - Completed perioperative antibiotics  - Zosyn since 11/30 for pneumonia. Per discussion with pharmacy, should cover aspiration pathogens.  - Given further decompensation 12/6, Vanco and meropenem started   - Follow up BC x 2, UA and BAL      Hx of recent C. Difficile  - C. Difficile toxin PCR positive 12/3 with negative antigen and negative toxin - per antimicrobial stewardship team, more likely to be colonization with recent history and no apparent treatment  - If clinically with persistent diarrhea or abdominal  symptoms, would have low threshold to start PO vancomycin  - Monitor fever curve, WBC, and inflammatory markers as appropriate    Heme:     #Acute blood loss anemia  No s/sx active bleeding  - CBC   - Hgb goal > 7.0    MSK / Skin:  Mini-thoracotomy  Rib fracture  Surgical incision  - Mini-thoracotomy precautions  - Incisional cares per protocol  - Sternal precautions, postop incision management protocol  - PT/OT/CR    Prophylaxis:     - Mechanical DVT ppx  - Chemical DVT ppx: HSQ   - PPI    Lines / Tubes / Drains:  - ETT  - Right fem CVC   - Arterial Line  - Barrientos  - OG    Disposition:  - CVICU    I spent a total of 60 minutes providing critical care services at the bedside, and on the critical care unit, evaluating the patient, directing care and reviewing laboratory values and radiologic reports for the patient.      Patient seen, findings and plan discussed with CVICU staff and cardiothoracic surgeons and fellow.    Ar Alexander PA-C      ====================================    HPI:   Presents to CVICU intubated and sedated.      PAST MEDICAL HISTORY:   Past Medical History:   Diagnosis Date    Benign essential hypertension     Cardiac pacemaker in situ     Medtronic    Chronic atrial fibrillation (H)     s/p AV node ablation and PPM placement January, 2019    Chronic diastolic heart failure (H)     Chronic kidney disease, stage III (moderate) (H)     Chronic right-sided heart failure (H)     NEDA (generalized anxiety disorder)     History of aortic dissection     s/p emergent repair 2019    History of blood transfusion     no adverse reactions    Severe tricuspid regurgitation        PAST SURGICAL HISTORY:   Past Surgical History:   Procedure Laterality Date    ANGIOGRAM Right 01/04/2019    Procedure: DIAGONSTIC ANGIOGRAM OF SMA;  Surgeon: Rigo Jennings MD;  Location: [facility] OR    BIOPSY BREAST      BYPASS GRAFT ARTERY CORONARY, REPAIR VALVE AORTIC, COMBINED N/A 01/04/2019    Procedure: AORTIC  DISSECTION REPAIR WITH GRAFT- HEMASHIELD PLATINUM WOVEN DOUBLE VELOR 4 SIDE ARM VASCULAR GRAFT, D:35 X 12 X 10 X 10MM/ L:50CM;  Surgeon: Jose Winslow MD;  Location:  OR    CV CORONARY ANGIOGRAM N/A 8/29/2023    Procedure: Coronary Angiogram;  Surgeon: Ar Morales MD;  Location:  HEART CARDIAC CATH LAB    CV PCI N/A 8/29/2023    Procedure: Percutaneous Coronary Intervention;  Surgeon: Ar Morales MD;  Location:  HEART CARDIAC CATH LAB    CV RIGHT HEART CATH MEASUREMENTS RECORDED N/A 8/29/2023    Procedure: Right Heart Catheterization;  Surgeon: Ar Morales MD;  Location:  HEART CARDIAC CATH LAB    EP ABLATION AV NODE N/A 01/11/2019    Procedure: EP Ablation AV Node;  Surgeon: Tsering Davey MD;  Location:  HEART CARDIAC CATH LAB    EP LEAD EXTRACTION  7/31/2023    EP PACEMAKER Left 01/11/2019    Procedure: EP Pacemaker;  Surgeon: Tsering Davey MD;  Location:  HEART CARDIAC CATH LAB    EP PACEMAKER LEADLESS DEVICE IMPLANT  7/31/2023    EP PACEMAKER LEADLESS DEVICE IMPLANT N/A 7/31/2023    Procedure: Pacemaker Leadless Device Implant;  Surgeon: Gaston Car MD;  Location: [facility]    REPAIR PATENT FORAMEN OVALE N/A 11/28/2023    Procedure: patent foramen ovale closure;  Surgeon: Joyce Mabry MD;  [facility]    REPAIR VALVE TRICUSPID MINIMALLY INVASIVE N/A 11/28/2023    Procedure: Right thoracotomy on cardiopulmonary bypass. Minimally Invasive Tricuspid Valve replacement using a 31mm Abbott Epic Plus Mitral Valve.  Intraoperative Transesophageal Echocardiogram (ALEX) per Anesthesia.;  Surgeon: Joyce Mabry MD;  Location: [facility] OR    THYROID SURGERY      benign mass removed    TRACHEOSTOMY N/A 01/25/2019    Procedure: TRACHEOSTOMY  (DR MCCLELLAND);  Surgeon: Bryson Mcclelland MD;  Location:  OR    TRANSESOPHAGEAL ECHOCARDIOGRAM INTRAOPERATIVE N/A 6/16/2023    Procedure: ECHOCARDIOGRAM,TRANSESOPHAGEAL,WITH ANESTHESIA;  Surgeon: Hardeep  Dennis Flores DO;  Location: [facility] OR    TRANSESOPHAGEAL ECHOCARDIOGRAM INTRAOPERATIVE N/A 9/8/2023    Procedure: ECHOCARDIOGRAM,TRANSESOPHAGEAL,WITH ANESTHESIA;  Surgeon: Rios Heredia MD;  Location: [facility] OR    TUBAL LIGATION         FAMILY HISTORY:   Family History   Problem Relation Age of Onset    Ataxia Mother     Heart Disease Father     Diabetes No family hx of     Myocardial Infarction No family hx of     Cerebrovascular Disease No family hx of     Coronary Artery Disease Early Onset No family hx of     Breast Cancer No family hx of     Colon Cancer No family hx of     Ovarian Cancer No family hx of     Anesthesia Reaction No family hx of     Deep Vein Thrombosis (DVT) No family hx of        SOCIAL HISTORY:   Social History     Tobacco Use    Smoking status: Never    Smokeless tobacco: Never   Substance Use Topics    Alcohol use: Never         OBJECTIVE:  1. VITAL SIGNS:   Temp:  [96.7  F (35.9  C)-97.7  F (36.5  C)] 97  F (36.1  C)  Pulse:  [60-64] 60  Resp:  [18-29] 18  BP: ()/(35-80) 80/60  MAP:  [59 mmHg-98 mmHg] 82 mmHg  Arterial Line BP: ()/(40-67) 124/57  FiO2 (%):  [7 %-100 %] 100 %  SpO2:  [90 %-100 %] 100 %    Vent Mode: CMV/AC  (Continuous Mandatory Ventilation/ Assist Control)  FiO2 (%): 100 %  Resp Rate (Set): 18 breaths/min  Tidal Volume (Set, mL): 420 mL  PEEP (cm H2O): 5 cmH2O  Resp: 18        2. INTAKE/ OUTPUT:   I/O last 3 completed shifts:  In: 640 [P.O.:640]  Out: 400 [Urine:400]      3. PHYSICAL EXAMINATION:   General: sedated  Neuro: sedated, does not follow commands   Resp: intubated, ventilated. Lungs very congested. Moderate tan secretions on suction   CV: fully paced   Abdomen: Soft, non-distended, non-tender  Incisions: c/d/i  Extremities: warm and well perfused      4. INVESTIGATIONS:   Arterial Blood Gases   Recent Labs   Lab 12/06/23  2214 12/06/23 2007 12/06/23  1905 12/06/23  1718   PH 7.40 7.38 7.15* 7.21*   PCO2 35 35 76* 65*   PO2 120* 293*  68* 66*   HCO3 22 21 27 26     Complete Blood Count   Recent Labs   Lab 12/06/23  1905 12/06/23  1735 12/06/23  1356 12/05/23  0855   WBC 20.6* 18.1* 16.4* 10.2   HGB 11.2*  11.2* 11.4* 10.9* 11.6*    263 267 197     Basic Metabolic Panel  Recent Labs   Lab 12/06/23  1926 12/06/23  1905 12/06/23  1720 12/06/23  1356 12/05/23  0855   NA  --  144  141 144 145 143   POTASSIUM  --  4.4  4.5 4.6 4.4 4.1   CHLORIDE  --  103 103 104 102   CO2  --  24 25 25 26   BUN  --  38.5* 37.5* 36.4* 31.6*   CR  --  1.83* 1.80* 1.71* 1.31*   * 127*  130* 117* 126* 104*     Liver Function Tests  Recent Labs   Lab 12/06/23  1905 12/06/23  1720 12/06/23  1356 12/05/23  0855 12/04/23  0918 12/03/23  0659 12/03/23  0635 12/02/23  0240 12/01/23  1634 12/01/23  1550   AST  --  47*  --   --   --   --  29 31  --  34   ALT  --  32  --   --   --   --  9 12  --  10   ALKPHOS  --  86  --   --   --   --  80 102  --  88   BILITOTAL  --  0.5  --   --   --   --  0.7 0.9  --  0.9   ALBUMIN  --  3.9  --   --   --   --  3.2* 3.4*  --  3.3*   INR 3.52*  --  3.71* 2.11* 1.57*   < >  --  1.40*   < >  --     < > = values in this interval not displayed.     Pancreatic Enzymes  No lab results found in last 7 days.  Coagulation Profile  Recent Labs   Lab 12/06/23  1905 12/06/23  1356 12/05/23  0855 12/04/23  0918   INR 3.52* 3.71* 2.11* 1.57*   PTT 83*  --   --   --          5. RADIOLOGY:       Recent Results (from the past 24 hour(s))   XR Abdomen Port 1 View    Narrative    EXAMINATION:  XR ABDOMEN PORT 1 VIEW 12/6/2023     COMPARISON: CT 2/3/2023, chest x-ray 12/5/2023.    HISTORY: monitor for dilated bowel with ongoing CDiff.    TECHNIQUE: Two frontal supine views of the abdomen.    FINDINGS: Postsurgical changes of the chest and support devices  similar to prior chest radiograph from 12/5/2023 No abnormally dilated  air-filled loops of bowel. Persistent elevation of the right  hemidiaphragm. No pneumatosis or portal venous gas.       Impression    IMPRESSION:     1. No air-filled dilated bowel loop  2. Persistent elevation of the right hemidiaphragm    I have personally reviewed the examination and initial interpretation  and I agree with the findings.    ELIZABETH VILLALBA MD         SYSTEM ID:  [id number]   XR Chest Port 1 View    Narrative    EXAM: Chest radiograph 12/6/2023 4:06 PM    HISTORY: 83 years Female increased SOB.     COMPARISON: Chest x-ray 12/5/2023.    TECHNIQUE: Portable AP view of the chest.    FINDINGS:   Postsurgical changes of the chest. Median sternotomy wires are intact.  Mediastinal surgical clips. Leadless cardiac pacer device overlying  the lower left chest. Tricuspid valve prosthesis. Trachea is  approximately midline. Stable elevation of the right hemidiaphragm.  Mildly increased diffuse interstitial opacities with bilateral  costophrenic angle blunting. No pneumothorax. The visualized upper  abdomen is unremarkable. No acute osseous abnormality.      Impression    IMPRESSION:   1.  Minimally increased diffuse interstitial opacities, likely  represents postsurgical edema/atelectasis. However, underlying  superimposed infectious etiology cannot be entirely ruled out.  Attention on follow-up.  2.  Postsurgical changes of the chest are stable. Support devices as  above.  3.  Small-to moderate bilateral effusions. Decreased on right.    I have personally reviewed the examination and initial interpretation  and I agree with the findings.    NASEEM RUEDA MD         SYSTEM ID:  [id number]   CT Head w/o Contrast    Impression    RESIDENT PRELIMINARY INTERPRETATION  IMPRESSION: No acute intracranial pathology.      CT Chest Abdomen Pelvis w/o Contrast    Narrative    EXAMINATION: CT CHEST ABDOMEN PELVIS W/O CONTRAST, 12/6/2023 8:56 PM    INDICATION: Shock, sepsis, code blue    COMPARISON STUDY: CT of 2/3/2023    TECHNIQUE: CT scan of the chest, abdomen and pelvis was performed on  multidetector CT scanner using volumetric  acquisition technique and  images were reconstructed in multiple planes with variable thickness  and reviewed on dedicated workstations.     CONTRAST: Without.   Without oral contrast.    CT scan radiation dose is optimized to minimum requisite dose using  automated dose modulation techniques.    FINDINGS:    Endotracheal tube terminates at the keegan.    Chest:   Limited noncontrast examination shows postoperative changes from type  A aortic dissection repair with its thin hyperdense dissection flap  extending from the distal aspect of the repair to the abdominal aortic  bifurcation as well as into the left common carotid artery, grossly  unchanged from comparison to prior contrast enhanced examination.    The heart is enlarged. Tricuspid valve prosthesis and leadless pacer  in the right atrium. Trace pericardial effusion. The esophagus is  unremarkable. No thoracic lymphadenopathy.    Trace bilateral pleural effusions. No pneumothorax. Near complete  collapse of the left lower lobe. Subsegmental dependent atelectasis in  the right lower lobe. Additional scattered groundglass and  consolidative opacities throughout the left upper lobe. Extensive  endobronchial mucous plugging in the left lower lobe. Mild bilateral  interlobular septal thickening.    Abdomen and Pelvis:  Liver: Stable too small to characterize hepatic hypodensities.  Otherwise unremarkable liver.    Biliary System: Cholelithiasis without evidence of acute  cholecystitis. No extrahepatic biliary dilatation.    Pancreas: No mass or pancreatic ductal dilation.    Adrenal glands: No mass or nodules    Spleen: Normal.    Kidneys: Small nonobstructive renal calculi bilaterally, unchanged. No  hydronephrosis.    Gastrointestinal tract: Normal caliber small and large bowel. The  appendix is within normal limits. Colonic diverticulosis without  evidence of acute diverticulitis.    Mesentery/peritoneum/retroperitoneum: Mesenteric edema without free  fluid or  free air.    Lymph nodes: No significant lymphadenopathy.    Pelvis: Urinary bladder is normal. Uterus and adnexa within normal  limits.    Osseous structures: Diffuse osteopenia with L2 level degenerative  changes of the spine and T12, L1 compression deformities, unchanged.  Acute mildly displaced and nondisplaced anterolateral right third,  fourth, and fifth and left fourth and fifth rib fractures.    Soft tissues: Diffuse subcutaneous anasarca.  Presumed chest wall  hematoma overlying the fractured right ribs, measuring 1.5 cm in  thickness.  3.5 cm collection in the right groin, likely a post  vascular access hematoma.  Hyperattenuating enlargement of the left  pectineus muscle.        Impression    IMPRESSION:     1. Limited noncontrast examination with grossly stable appearance of  type A aortic dissection repair with dissection flap extending from  the distal aspect of the repair through the abdominal aortic  bifurcation.  2. Acute mildly displaced and nondisplaced right 3-5 rib fractures  with associated right chest wall hematoma and nondisplaced  anterolateral left fourth and fifth rib fractures, presumably related  to cardiopulmonary resuscitative efforts.   3. Near complete atelectasis of the left lower lobe associated with  extensive and bronchial mucous plugging; consider aspiration.  Subsegmental atelectasis of the right lower lobe. Additional scattered  groundglass and consolidative opacities throughout the left upper lobe  may represent edema and/or infection.  4. Trace bilateral pleural effusions.  5. 3.5 cm right groin collection, presumably a post vascular access  hematoma.  5. Left pectineus intramuscular hematoma.  6. No acute intra-abdominal pathology suspected.  7. Endotracheal tube terminates at the keegan. Recommend retraction.    I have personally reviewed the examination and initial interpretation  and I agree with the findings.    ADRIANNA GAYTAN,          SYSTEM ID:  [id number]        =========================================

## 2023-12-07 NOTE — PROGRESS NOTES
Arrived on the unit from 6C at 1920. Patient respiratory arrested on 6C, intubated and transferred to . Report received from KIKE Hendrix.    Neuro: Patient arouses to pain, intermittently follows commands, bilateral mitts on, pupils equal and reactive, withdraws in all extremities.  CV: Levo currently at 0.07mcg/kg/min to maintain MAPs>65. Pulses palpable.  Pulm: CMV/AC 50%/390/18/8, initially needed a bear hugger on transfer to the unit for temp of 96.1 degrees Farenheit. Now currently at 98.8 Farenheit. Minimal oral and ETT secretions. Lungs coarse, diminished in bases.  GI/: LBM 12/2, loose green. Barrientos cathter w/adequate UOP. OG clamped 60@ the lip. Remains NPO.  Skin: See flowsheet  Lines/Gtts: L femoral art line, Left 3.lumen CVC: Fentanyl @ 50mcg/hr, Levo 0.07 mcg/kg/min, Precedex 0.3mcg/kg/hr. L & R PIV    Plan: Continue to monitor. Update team w/ acute changes in patient condition.    For vital signs and complete assessments, please see documentation flowsheets.

## 2023-12-07 NOTE — PROCEDURES
[facility]    Triple Lumen PICC Placement    Date/Time: 12/7/2023 1:05 PM    Performed by: Todd Weber RN  Authorized by: Joyce Mabry MD  Indications: vascular access      UNIVERSAL PROTOCOL   Site Marked: Yes  Prior Images Obtained and Reviewed:  Yes  Required items: Required blood products, implants, devices and special equipment available    Patient identity confirmed:  Verbally with patient and arm band  NA - No sedation, light sedation, or local anesthesia  Confirmation Checklist:  Patient's identity using two indicators and relevant allergies  Time out: Immediately prior to the procedure a time out was called    Universal Protocol: the Joint Commission Universal Protocol was followed    Preparation: Patient was prepped and draped in usual sterile fashion       ANESTHESIA    Anesthesia:  Local infiltration  Local Anesthetic:  Lidocaine 1% without epinephrine  Anesthetic Total (mL):  5      SEDATION    Patient Sedated: No        Preparation: skin prepped with ChloraPrep  Skin prep agent: skin prep agent completely dried prior to procedure  Sterile barriers: maximum sterile barriers were used: cap, mask, sterile gown, sterile gloves, and large sterile sheet  Hand hygiene: hand hygiene performed prior to central venous catheter insertion  Type of line used: PICC  Catheter type: triple lumen  Lumen type: non-valved and power PICC  Lumen Identification: Gray, White and Red  Catheter size: 5 Fr  Brand: Bard  Lot number: [id number]  Placement method: venipuncture, MST, ultrasound and other (see comment) (CXR)  Number of attempts: 1  Difficulty threading catheter: no  Successful placement: yes  Orientation: right    Location: basilic vein  Tip Location: RA (high RA)  Arm circumference: adults 10 cm  Extremity circumference: 30  Visible catheter length: 3  Total catheter length: 40  Dressing and securement: adhesive securement device, alcohol  impregnated caps, chlorhexidine disc applied, gloves changed prior to final dressing, glue, securement device, site cleansed, subcutaneous anchor securement system and transparent securement dressing  Post procedure assessment: blood return through all ports, free fluid flow and placement verified by x-ray  PROCEDURE   Patient Tolerance:  Patient tolerated the procedure well with no immediate complicationsDescribe Procedure: PICC ok to use  Disposal: sharps and needle count correct at the end of procedure, needles and guidewire disposed in sharps container

## 2023-12-07 NOTE — PROGRESS NOTES
CV ICU PROGRESS NOTE  December 7, 2023   Priya Funez  [id number]  Admitted: 11/28/2023 10:08 AM      ASSESSMENT: Priya Funez is a 83 year old female with a PMH of HFpEF, severe TR, permanent AF s/p AVN ablation with PPM implant 1/11/19 s/p extraction TV (transvenous) PPM and implant leadless PPM 7/31/23, emergent aortic dissection repair 1/4/19, HTN, CKD, NEDA, and obesity who presented as an outpatient for elective TVR. Patient is now s/p mini TVR on 11/28/2023 with Dr. Mabry. Transferred to floor 12/2. RRT called 12/6 for worsening hypoxia and resp distress; subsequently had respiratory arrest with short CPR prior to ROSC. Intubated and transferred back to ICU.      CO-MORBIDITIES:   S/P TVR (tricuspid valve replacement)  (primary encounter diagnosis)  Complete heart block (H)  Displacement of atrial pacemaker leads, initial encounter    PLAN:  Neuro/ pain/ sedation:  - Monitor neurological status. Notify the MD for any acute changes in exam.  #Acute postoperative pain  - Fentanyl   - PRN: Tylenol, oxycodone, Dilaudid  #Sedation  - Gtt:precedex   - RASS goal 0 to -1, pt is able to awaken to voice, follow commands in all extremities   - Head CT w/o intracranial pathologies 12/6      Hx of general anxiety disorder  Pain well controlled with current regimen:  - PRN Acetaminophen, oxycodone, IV dilaudid, methocarbamol  - Pain catheter removed 12/2  - PTA Lexapro and Remeron  - PRN Atarax      Pulmonary care:   #Mechanical ventilation  #Acute hypercapnic and hypoxic respiratory failure requiring BiPAP  #Aspiration pneumonitis vs pneumonia  #Possible aspiration event 12/1  #Acute respiratory failure 12/6/23   #Near complete plugging of left lower lobe on CT   #Acute mildly displaced and nondisplaced right 3-5 rib fractures  with associated right chest wall hematoma and nondisplaced  anterolateral left fourth and fifth rib fractures  Vent Mode: CMV/AC  (Continuous Mandatory Ventilation/ Assist Control)  FiO2  (%): 50 %  Resp Rate (Set): 18 breaths/min  Tidal Volume (Set, mL): 390 mL  PEEP (cm H2O): (S) 8 cmH2O (per MD order)  Resp: 18  - Pulm hygiene, IS, activity and deep breathing  - CPT, mucomyst, duoneb ordered   - Duonebs Q4 hours while awake  - CXR 12/5 stable  - RRT called 12/6 for worsening hypoxia and resp distress; subsequently had respiratory arrest with short CPR prior to ROSC. Intubated and transferred back to ICU.   - CXR with persistent left pleural effusion and atelectasis   - plan for a bronch today   - PST  - Did discuss case with ENT, will allow for more time and consider trach start of next week 12/11       Cardiovascular:    #Hx of severe TR s/p TVR  #HFpEF  #Hx of Afib s/p AVN ablation - PPM, upgraded to leadless 7/2023  #Hypertension  #Hyperlipidemia  #Hx of aortic dissection repair 2019  # Respiratory arrest 12/6   # Elevated BNP   Stable paced rhythm with underlying atrial flutter. On review of cardiology, underlying aflutter/afib has been present since at least June of this year. HD stable. No hydralazine use overnight.  Pre-op 9/8/23 echo: LVEF 55/60%, mildly dilated RV, normal function  Echo 12/4: LVEF 60-65%, normal RV function, TV mean gradient 5 mmHg, no TR  - Plan for a repeat echo today 2/2 CPR  - ASA 81 mg daily  - Holding Metoprolol 12.5 mg BID (PTA 25 mg daily)  - Device RN adjusted PPM to PTA settings rate 60   - Holding PTA spironolactone 25 mg daily  - Holding PTA jardiance  - Warfarin per CVTS, currently on hold 2/2 supra therapeutic INR   - Pressors: Levo 0.04   - currently on stress dose steroids.   - BNP significantly elevated      GI care / Nutrition:   #Multiple loose BMs  #Hx of C. Difficile 9/2023  - if unable to extubate today will start enteral feeds in the next 24 hours    - prior to reintubation SLP recommended regular diet with thin liquids. Patient with history of dysphagia, possible aspiration event on 12/1  - Many loose bowel movements, improving with holding bowel  regimen  - Consider starting oral vancomycin for prophylaxis.   - NPO, bedside swallow eval once extubated  - PPI  - Bowel regimen: MiraLAX, senna--on hold   - OG tube for meds      Renal / Fluids / Electrolytes:   #CKD stage 3  #Lactic acidosis, resolved  #Hypokalemia  #IMAN on CKD   BL creat ~1.2-1.3, most recent creatinine 1.73  FLUID STATUS: Pre-op weight 70.9 kg, weight today 72.9kg  24 hour fluid status: inaccurate with multiple unmeasured voids, clinically hypervolemic with lower extremity swelling and evidence of pulmonary edema  - HOLDING PTA spironolactone 25 mg daily  - HOLDING PTA torsemide   - Received bolus dosing of Bumex on 12/6, will re-dose today with a goal being net negative 500-1L   - Replete lytes per protocol  - Strict I/O, daily weights  - Avoid/limit nephrotoxins as able  - Strict I/O, daily weights, avoid/limit nephrotoxins  - Replete lytes PRN per protocol     Endocrine:    #Stress induced hyperglycemia  #T2DM  Hgb A1c 5.7%  - sliding scale insulin q4 hours  - PTA Jardiance on hold, resume once medically optimized   - Goal BG <180 for optimal healing     ID / Antibiotics:  #Stress induced leukocytosis  #Probable aspiration pneumonitis vs pneumonia  WBC 14.2 remains afebrile   - Completed perioperative antibiotics  - Zosyn since 11/30 for pneumonia.   - 12/6 Given further decompensation, Vanco and meropenem started    - Will stop Vancomycin at this time  - Follow up BC x 2, UA and BAL  - urine with Candida and sputum with Yeast, likely both colonizations. Will not start an antifungal at this time       #Hx of recent C. Difficile  - C. Difficile toxin PCR positive 12/3 with negative antigen and negative toxin - per antimicrobial stewardship team, more likely to be colonization with recent history and no apparent treatment  - If clinically with persistent diarrhea or abdominal symptoms, would have low threshold to start PO vancomycin   - will start oral vancomycin 2/2 now hypotensive and in  the ICU. Persistent leukocytosis.    - Will discontinue once ABX complete   - Monitor fever curve, WBC, and inflammatory markers as appropriate     Heme:     #Acute blood loss anemia  No s/sx active bleeding  - CBC   - Hgb goal > 7.0     MSK / Skin:  #Mini-thoracotomy  #Rib fracture  #Surgical incision  - Mini-thoracotomy precautions  - Incisional cares per protocol  - Sternal precautions, postop incision management protocol  - PT/OT/CR    Prophylaxis:    - VTE: SCD's, warfarin   - Bowel regimen: PPI, MiraLAX, senna-- currently on hold     Lines/ tubes/ drains:  - ETT  - Left femoral CVC   - Arterial Line  - Barrientos, OG    Disposition:  - CVICU    ICU Checklist  F - Feeding: Consider starting enteral feeds   A - Analgesia: Fentanyl   S - Sedation: Precedex  T - Thromboembolic prophylaxis: warfarin      H - Head of bed elevated: yes   U - Ulcer prophylaxis: protonix   G - Glycemic control: sliding scale insulin   S - SBT- once optimized      B - Bowel regimen- currently on hold   I - Indwelling catheter- yes  D - De-escalation of antibiotics    Patient seen, findings and plan discussed with CVICU staff and cardiothoracic surgeons.    Shari Millan NP  ICU  12/7/2023 at 6:21 AM      ====================================    TODAY'S PROGRESS  SUBJECTIVE  Examined while resting in bed. Remains intubated, and lightly sedated. Able to follow commands in all extremities     OBJECTIVE  1. VITAL SIGNS  Temp:  [96.1  F (35.6  C)-99.1  F (37.3  C)] 98.8  F (37.1  C)  Pulse:  [] 80  Resp:  [18-29] 18  BP: ()/(35-80) 80/60  MAP:  [51 mmHg-133 mmHg] 78 mmHg  Arterial Line BP: ()/(38-93) 115/56  FiO2 (%):  [7 %-100 %] 50 %  SpO2:  [90 %-100 %] 97 %  Vent Mode: CMV/AC  (Continuous Mandatory Ventilation/ Assist Control)  FiO2 (%): 50 %  Resp Rate (Set): 18 breaths/min  Tidal Volume (Set, mL): 390 mL  PEEP (cm H2O): (S) 8 cmH2O (per MD order)  Resp: 18      2. INTAKE/ OUTPUT  I/O last 3 completed shifts:  In:  675.89 [P.O.:520; I.V.:155.89]  Out: 100 [Urine:100]    3. PHYSICAL EXAMINATION    General: orally intubated, sedation, follows commands on all extremities   Neuro: VERDIN with equal strength.   Resp: diminished bilateral lower lobes, mechanically ventilated   CV: paced  Abdomen: Soft, non-distended, non-tender  Incisions: thoracotomy incision is dbi   Extremities: warm and well perfused. +2 edema bilaterally         4. INVESTIGATIONS  Arterial Blood Gases   Recent Labs   Lab 12/07/23  0515 12/06/23 2214 12/06/23 2007 12/06/23  1905   PH 7.38 7.40 7.38 7.15*   PCO2 37 35 35 76*   PO2 60* 120* 293* 68*   HCO3 22 22 21 27     Complete Blood Count   Recent Labs   Lab 12/07/23  0128 12/06/23 2210 12/06/23 1905 12/06/23  1735   WBC 14.2* 14.2* 20.6* 18.1*   HGB 9.3* 9.7* 11.2*  11.2* 11.4*    194 321 263     Basic Metabolic Panel  Recent Labs   Lab 12/07/23  0504 12/07/23  0409 12/07/23  0144 12/07/23  0128 12/06/23 2210 12/06/23 1926 12/06/23 1905   NA  --  142  --  144  144 143  --  144  141   POTASSIUM  --  4.3  --  4.3  4.3 4.6  --  4.4  4.5   CHLORIDE  --  105  --  106  106 103  --  103   CO2  --  20*  --  20*  20* 21*  --  24   BUN  --  37.3*  --  37.6*  37.6* 39.8*  --  38.5*   CR  --  1.73*  --  1.67*  1.67* 1.89*  --  1.83*   GLC 95 101* 107* 110*  110* 109*   < > 127*  130*    < > = values in this interval not displayed.     Liver Function Tests  Recent Labs   Lab 12/07/23  0128 12/06/23  2210 12/06/23  1905 12/06/23  1720 12/06/23  1356 12/03/23  0659 12/03/23  0635   AST 32 38  --  47*  --   --  29   ALT 23 22  --  32  --   --  9   ALKPHOS 68 70  --  86  --   --  80   BILITOTAL 0.4 0.4  --  0.5  --   --  0.7   ALBUMIN 2.9* 3.1*  --  3.9  --   --  3.2*   INR 3.93* 3.90* 3.52*  --  3.71*   < >  --     < > = values in this interval not displayed.     Pancreatic Enzymes  No lab results found in last 7 days.  Coagulation Profile  Recent Labs   Lab 12/07/23  0128 12/06/23  2210 12/06/23  1656  "12/06/23  1356   INR 3.93* 3.90* 3.52* 3.71*   PTT  --  36 83*  --      Lactate  Invalid input(s): \"LACTATE\"    5. RADIOLOGY  Recent Results (from the past 24 hour(s))   XR Abdomen Port 1 View    Narrative    EXAMINATION:  XR ABDOMEN PORT 1 VIEW 12/6/2023     COMPARISON: CT 2/3/2023, chest x-ray 12/5/2023.    HISTORY: monitor for dilated bowel with ongoing CDiff.    TECHNIQUE: Two frontal supine views of the abdomen.    FINDINGS: Postsurgical changes of the chest and support devices  similar to prior chest radiograph from 12/5/2023 No abnormally dilated  air-filled loops of bowel. Persistent elevation of the right  hemidiaphragm. No pneumatosis or portal venous gas.      Impression    IMPRESSION:     1. No air-filled dilated bowel loop  2. Persistent elevation of the right hemidiaphragm    I have personally reviewed the examination and initial interpretation  and I agree with the findings.    ELIZABETH VILLALBA MD         SYSTEM ID:  [id number]   XR Chest Port 1 View    Narrative    EXAM: Chest radiograph 12/6/2023 4:06 PM    HISTORY: 83 years Female increased SOB.     COMPARISON: Chest x-ray 12/5/2023.    TECHNIQUE: Portable AP view of the chest.    FINDINGS:   Postsurgical changes of the chest. Median sternotomy wires are intact.  Mediastinal surgical clips. Leadless cardiac pacer device overlying  the lower left chest. Tricuspid valve prosthesis. Trachea is  approximately midline. Stable elevation of the right hemidiaphragm.  Mildly increased diffuse interstitial opacities with bilateral  costophrenic angle blunting. No pneumothorax. The visualized upper  abdomen is unremarkable. No acute osseous abnormality.      Impression    IMPRESSION:   1.  Minimally increased diffuse interstitial opacities, likely  represents postsurgical edema/atelectasis. However, underlying  superimposed infectious etiology cannot be entirely ruled out.  Attention on follow-up.  2.  Postsurgical changes of the chest are stable. Support " devices as  above.  3.  Small-to moderate bilateral effusions. Decreased on right.    I have personally reviewed the examination and initial interpretation  and I agree with the findings.    NASEEM RUEDA MD         SYSTEM ID:  [id number]   CT Head w/o Contrast    Impression    RESIDENT PRELIMINARY INTERPRETATION  IMPRESSION: No acute intracranial pathology.      CT Chest Abdomen Pelvis w/o Contrast    Narrative    EXAMINATION: CT CHEST ABDOMEN PELVIS W/O CONTRAST, 12/6/2023 8:56 PM    INDICATION: Shock, sepsis, code blue    COMPARISON STUDY: CT of 2/3/2023    TECHNIQUE: CT scan of the chest, abdomen and pelvis was performed on  multidetector CT scanner using volumetric acquisition technique and  images were reconstructed in multiple planes with variable thickness  and reviewed on dedicated workstations.     CONTRAST: Without.   Without oral contrast.    CT scan radiation dose is optimized to minimum requisite dose using  automated dose modulation techniques.    FINDINGS:    Endotracheal tube terminates at the keegan.    Chest:   Limited noncontrast examination shows postoperative changes from type  A aortic dissection repair with its thin hyperdense dissection flap  extending from the distal aspect of the repair to the abdominal aortic  bifurcation as well as into the left common carotid artery, grossly  unchanged from comparison to prior contrast enhanced examination.    The heart is enlarged. Tricuspid valve prosthesis and leadless pacer  in the right atrium. Trace pericardial effusion. The esophagus is  unremarkable. No thoracic lymphadenopathy.    Trace bilateral pleural effusions. No pneumothorax. Near complete  collapse of the left lower lobe. Subsegmental dependent atelectasis in  the right lower lobe. Additional scattered groundglass and  consolidative opacities throughout the left upper lobe. Extensive  endobronchial mucous plugging in the left lower lobe. Mild bilateral  interlobular septal  thickening.    Abdomen and Pelvis:  Liver: Stable too small to characterize hepatic hypodensities.  Otherwise unremarkable liver.    Biliary System: Cholelithiasis without evidence of acute  cholecystitis. No extrahepatic biliary dilatation.    Pancreas: No mass or pancreatic ductal dilation.    Adrenal glands: No mass or nodules    Spleen: Normal.    Kidneys: Small nonobstructive renal calculi bilaterally, unchanged. No  hydronephrosis.    Gastrointestinal tract: Normal caliber small and large bowel. The  appendix is within normal limits. Colonic diverticulosis without  evidence of acute diverticulitis.    Mesentery/peritoneum/retroperitoneum: Mesenteric edema without free  fluid or free air.    Lymph nodes: No significant lymphadenopathy.    Pelvis: Urinary bladder is normal. Uterus and adnexa within normal  limits.    Osseous structures: Diffuse osteopenia with L2 level degenerative  changes of the spine and T12, L1 compression deformities, unchanged.  Acute mildly displaced and nondisplaced anterolateral right third,  fourth, and fifth and left fourth and fifth rib fractures.    Soft tissues: Diffuse subcutaneous anasarca.  Presumed chest wall  hematoma overlying the fractured right ribs, measuring 1.5 cm in  thickness.  3.5 cm collection in the right groin, likely a post  vascular access hematoma.  Hyperattenuating enlargement of the left  pectineus muscle.        Impression    IMPRESSION:     1. Limited noncontrast examination with grossly stable appearance of  type A aortic dissection repair with dissection flap extending from  the distal aspect of the repair through the abdominal aortic  bifurcation.  2. Acute mildly displaced and nondisplaced right 3-5 rib fractures  with associated right chest wall hematoma and nondisplaced  anterolateral left fourth and fifth rib fractures, presumably related  to cardiopulmonary resuscitative efforts.   3. Near complete atelectasis of the left lower lobe associated  with  extensive and bronchial mucous plugging; consider aspiration.  Subsegmental atelectasis of the right lower lobe. Additional scattered  groundglass and consolidative opacities throughout the left upper lobe  may represent edema and/or infection.  4. Trace bilateral pleural effusions.  5. 3.5 cm right groin collection, presumably a post vascular access  hematoma.  5. Left pectineus intramuscular hematoma.  6. No acute intra-abdominal pathology suspected.  7. Endotracheal tube terminates at the keegan. Recommend retraction.    I have personally reviewed the examination and initial interpretation  and I agree with the findings.    ADRIANNA GAYTAN DO         SYSTEM ID:  [id number]   XR Chest Port 1 View    Impression    RESIDENT PRELIMINARY INTERPRETATION  Impression:   1. New endotracheal tube with tip in the mid thoracic trachea.  2. Increased left lung opacity is represent atelectasis, and edema.  3. Improved aeration of the right lung.  4. Small left greater than right pleural effusions.   XR Abdomen Port 1 View    Impression    RESIDENT PRELIMINARY INTERPRETATION  IMPRESSION: Enteric tube is in the stomach..    XR Chest Port 1 View    Narrative    Portable chest 12/7/2023 at 0106 hours    Indication: Daily monitoring. Short of breath.    COMPARISON: Yesterday 2259 hours    FINDINGS: Previous pacemaker. Median sternotomy. Endotracheal  intubation with tube tip approximately 4.2 cm above the keegan. No  ectopic air. Patchy densities especially in the left lung with left  costophrenic angle blunting appears similar in the short interval.  Retrocardiac density prominence and silhouetting the left  hemidiaphragm unchanged in the short interval.      Impression    IMPRESSION: Continued left pleural effusion with retrocardiac  atelectasis and perihilar edema.    NASEEM RUEDA MD         SYSTEM ID:  [id number]

## 2023-12-07 NOTE — PROGRESS NOTES
Pt in respiratory distress requiring increased O2 at 1500. CVTS notified,at the bedside. Nebulizers given and bipap initiated by RT.  RRT initiated. 02 saturations continued to decrease to 79% on 100% O2. RT at bedside, pt lifted into bed and code blue called.

## 2023-12-07 NOTE — PROCEDURES
Procedure Note - Femoral Central Line and Femoral Arterial Line Placement    Date: 12/6/2023    Operation Performed: Insertion of arterial and central veionus lines into the left femoral artery and left femoral vein.    Surgeon: Joselito Gaytan MD    Indication: Priya Funez is a 83 year old female  requiring central venous catheter access and arterial access for hemodynamic monitoring and central intravenous access as the patient was hemodynamically unstable.    Preoperative Diagnosis: Hemodynamic instability, need for vascular access    Postoperative Diagnosis: Same as above    Procedural Details:    The groin site was prepped with chlorhexidine based solution.  An introducer needle was inserted through the skin into the femoral vein. Once dark, venous, nonpulsatile blood was aspirated a guidewire was advanced into the vein without resistance to roughly 25cm.  The introducer needle was withdrawn and a #11 scalpel was used to enlarge the skin incision.  A subcutaneous dilator was passed once, and then the 7 Dutch triple lumen catheter was threaded onto the guidwire and passed into the vessel.  The guidewire was withdrawn, and blood was aspirated from the distal port without difficulty.  Caps were applied to all three ports, and air was withdrawn, and the ports flushed with sterile saline.  The catheter was then fixated to the skin with 0-silk suture.  A sterile chlorhexidine impregnated dressing was then used to cover the catheter once it had been cleaned.      The femoral site was then prepped with chlorhexidine based solution.  Sterile gloves and technique were then used to advance an introducer needle to access the artery.  Once a flash of blood was obtained into the reservoir, the Seldinger technique was employed by advancing a guidewire into the lumen, and subsequently the 20 gauge catheter.  Pulsatile, bright red blood was noted to flow from the catheter, and so pressure tubing was connected, and a  multiphasic waveform was noted on the pressure monitor screen.  The site was cleaned and dressed with a sterile dressing with a  Chlorhexidine based gel pad.    Wound Class: II, these lines were put in with sub-sterile technique given the emergent nature of the situation, they should be removed and replaced in a sterile fashion when able.    Signed:    Joselito Gaytan MD 12/6/2023 at 8:25 PM  Cardiothoracic Surgery Fellow  Pager: [phone]

## 2023-12-07 NOTE — PHARMACY-VANCOMYCIN DOSING SERVICE
Pharmacy Vancomycin Initial Note  Date of Service 2023  Patient's  1940  83 year old, female    Indication: [facility]ated Pneumonia and Sepsis    Current estimated CrCl = Estimated Creatinine Clearance: 23.3 mL/min (A) (based on SCr of 1.83 mg/dL (H)).    Creatinine for last 3 days  2023:  1:18 AM Creatinine 1.03 mg/dL  2023:  8:55 AM Creatinine 1.31 mg/dL  2023:  1:56 PM Creatinine 1.71 mg/dL;  5:20 PM Creatinine 1.80 mg/dL;  7:05 PM Creatinine 1.83 mg/dL    Recent Vancomycin Level(s) for last 3 days  No results found for requested labs within last 3 days.      Vancomycin IV Administrations (past 72 hours)                     vancomycin (VANCOCIN) 1,750 mg in sodium chloride 0.9 % 500 mL intermittent infusion (mg) 1,750 mg New Bag 23                    Nephrotoxins and other renal medications (From now, onward)      Start     Dose/Rate Route Frequency Ordered Stop    23  vancomycin place frausto - receiving intermittent dosing         1 each Intravenous SEE ADMIN INSTRUCTIONS 23  [Held by provider]  bumetanide (BUMEX) injection 4 mg        (On hold since today at 1952 until manually unheld; held by Ar Alexander PA-CHold Reason: Change in Vitals)    4 mg Intravenous 2 TIMES DAILY 23 0954      23  vasopressin (VASOSTRICT) 20 Units in sodium chloride 0.9 % 100 mL standard conc infusion         2.4 Units/hr  12 mL/hr  Intravenous CONTINUOUS 23  norepinephrine (LEVOPHED) 16 mg in  mL infusion MAX CONC CENTRAL LINE         0.01-0.6 mcg/kg/min × 70.8 kg (Dosing Weight)  0.7-39.8 mL/hr  Intravenous CONTINUOUS 23  vancomycin (VANCOCIN) 1,750 mg in sodium chloride 0.9 % 500 mL intermittent infusion         1,750 mg  over 120 Minutes Intravenous ONCE 23              Contrast Orders - past 72 hours (72h ago, onward)      None             [employer] Prediction of Planned Initial Vancomycin Regimen  N/A        Plan:  Start vancomycin  1750 mg IV x1, then intermittent dosing in setting of uptrending SCr/IMAN  Vancomycin monitoring method: Trough (Method 2 = manual dose calculation)  Vancomycin therapeutic monitoring goal: 15-20 mg/L  Pharmacy will check vancomycin levels as appropriate in 1-3 Days.    Serum creatinine levels will be ordered daily for the first week of therapy and at least twice weekly for subsequent weeks.      Lucie Scott [facility]

## 2023-12-07 NOTE — PROVIDER NOTIFICATION
12/06/23 1700   Call Information   Date of Call 12/06/23   Time of Call 1634   Name of person requesting the team Ruma Castañeda   Title of person requesting team RN   RRT Arrival time 1635   Time RRT ended 1740   Reason for call   Type of RRT Adult   Primary reason for call Respiratory   Respiratory Rate greater than 24;Labored breathing   Was patient transferred from the ED, ICU, or PACU within last 24 hours prior to RRT call? No   SBAR   Situation Increasing Fi02 needs and WOB.   Background PMH of HFpEF, severe TR, permanent AF s/p AVN ablation with PPM implant 1/11/19 s/p extraction TV (transvenous) PPM and implant leadless PPM 7/31/23, emergent aortic dissection repair 1/4/19, HTN, CKD, NEDA, and obesity who presented as an outpatient for elective TVR. Patient is now s/p mini TVR on 11/28/2023.   Notable History/Conditions Cardiac;Hypertension;Recent surgery   Assessment A+O x 4.  Up in chair.  Denies C.P., fever, chills, n/v.  Endorses SOB at rest.  RR 24 on 10L Oxymask with 02 sat 92% with ABG 7.21-65-66-26. Changed to BiPap 12/6, Rate 14, with Fi02 currently 70%.  RR currently 20 with 02 sat 94%.  Pt. reports less SOB with BiPap.   Interventions CXR;ECG;Labs;Meds;Neb treatment;O2 per N/C or mask;Portable monitor   Adjustments to Recommend Pending CT, resume Zosyn, insert coello.   Patient Outcome   Patient Outcome Stabilized on unit   RRT Team   Attending/Primary/Covering Physician Primary team notified.   Date Attending Physician notified 12/06/23   Time Attending Physician notified 1634   Physician(s) Tavia Dacosta PA-C   Lead RN Camille Castañeda   RT Ruperto   Other staff Karen Prasad PA-C CVTS.   Post RRT Intervention Assessment   Post RRT Assessment Transferred to ICU   Date Follow Up Done 12/06/23   Time Follow Up Done 1918   Comments Increasing respiratory distress requiring Code Blue for intubation. Intubated by  Anesthesia without complication on 6C. Transferred to 4A.   See also Code Blue record.

## 2023-12-07 NOTE — ANESTHESIA CARE TRANSFER NOTE
Patient: Priya Funez    Procedure: * No procedures listed *       Diagnosis: * No pre-op diagnosis entered *  Diagnosis Additional Information: No value filed.    Anesthesia Type:   No value filed.     Note:    Oropharynx: endotracheal tube in place and ventilatory support  Level of Consciousness: unresponsive        Dentition: dentition unchanged  Vital Signs Stable: post-procedure vital signs reviewed and stable  Report to RN Given: handoff report given  Patient transferred to: Telemetry/Step Down Unit  Comments: 10-20 mcg pushes of epinepherine administered for hypotension until peripheral norepi started. Intubated with 50 mg rocuronium. 100 mcg fentanyl. Team notified patient will have residual neuromuscular blockadefor 1-2 hours and sedation should be started when hemodynamically stable.   Handoff Report: Identifed the Patient, Identified the Reponsible Provider, Reviewed the pertinent medical history, Discussed the surgical course, Reviewed Intra-OP anesthesia mangement and issues during anesthesia, Set expectations for post-procedure period and Allowed opportunity for questions and acknowledgement of understanding      Vitals:  Vitals Value Taken Time   BP 96/52 12/06/23 1918   Temp     Pulse 63 12/06/23 1918   Resp 16 12/06/23 1918   SpO2 100 % 12/06/23 1914   Vitals shown include unfiled device data.    Electronically Signed By: Leslie Goldberg, MD  December 6, 2023  7:19 PM

## 2023-12-07 NOTE — PROGRESS NOTES
RRT called for pt due to increased WOB and increasing O2 needs. Pt only tolerated BiPAP earlier for about -30mins. Pt was on 7L oxymask upon RT arrival. STAT ABG order done and placed pt back on BiPAP (see charting) tolerating it well. Anxiety meds to be given by RN.     Labs/EKG at bedside- pending possible CT scan and awaiting ABG results.     Ruperto Hale, RRT

## 2023-12-07 NOTE — PROGRESS NOTES
Neuro: Sedated on precedex 0.2mcg/kg/hr, Fentanyl 50mcg/hr. Answers Y/N questions. Opens eyes spontaneously.     CV  Rate/rhythm: 100% V paced, A flutter, 60s-90s    Pulm: CMV/AC 50%/390/18/8   Bronch and cultures sent    GI: NPO, OG 60@ lip    : Barrientos catheter, Bumex given x2    Skin: Old R groin incision, scattered generalized bruising, Old R CT site, Brittany area reddened, sacrum/coccyx mepilex    Drains: R radial a-line, R PICC, R&L PIV    Drips: Precedex 0.2mcg/kg/hr, Fentanyl 50mcg/hr, Levo 0.07mcg/kg/min    Labs: K replaced

## 2023-12-08 NOTE — PROGRESS NOTES
CV ICU PROGRESS NOTE  December 8, 2023   Priya Funez  [id number]  Admitted: 11/28/2023 10:08 AM      ASSESSMENT: Priya Funez is a 83 year old female with a PMH of HFpEF, severe TR, permanent AF s/p AVN ablation with PPM implant 1/11/19 s/p extraction TV (transvenous) PPM and implant leadless PPM 7/31/23, emergent aortic dissection repair 1/4/19, HTN, CKD, NEDA, and obesity who presented as an outpatient for elective TVR. Patient is now s/p mini TVR on 11/28/2023 with Dr. Mabry. Transferred to floor 12/2. RRT called 12/6 for worsening hypoxia and resp distress; subsequently had respiratory arrest with short CPR prior to ROSC. Intubated and transferred back to ICU.      CO-MORBIDITIES:   S/P TVR (tricuspid valve replacement)  (primary encounter diagnosis)  Complete heart block (H)  Displacement of atrial pacemaker leads, initial encounter    PLAN:  Neuro/ pain/ sedation:  - Monitor neurological status. Notify the MD for any acute changes in exam.  #Acute postoperative pain  - Fentanyl- will transition to prn push only    - PRN: Tylenol, oxycodone, Dilaudid  #Sedation  - Gtt:precedex   - RASS goal 0 to -1, pt is able to awaken to voice, follow commands in all extremities   - Head CT w/o intracranial pathologies 12/6      Hx of general anxiety disorder  Pain well controlled with current regimen:  - PRN Acetaminophen, oxycodone, IV dilaudid, methocarbamol  - Pain catheter removed 12/2  - PTA Lexapro and Remeron  - PRN Atarax      Pulmonary care:   #Mechanical ventilation  #Acute hypercapnic and hypoxic respiratory failure requiring BiPAP  #Aspiration pneumonitis vs pneumonia  #Possible aspiration event 12/1  #Acute respiratory failure 12/6/23   #Near complete plugging of left lower lobe on CT   #Acute mildly displaced and nondisplaced right 3-5 rib fractures  with associated right chest wall hematoma and nondisplaced  anterolateral left fourth and fifth rib fractures  Vent Mode: CMV/AC  (Continuous Mandatory  Ventilation/ Assist Control)  FiO2 (%): (S) 40 %  Resp Rate (Set): 18 breaths/min  Tidal Volume (Set, mL): 390 mL  PEEP (cm H2O): (S) 5 cmH2O (per order)  Resp: 12  - Pulm hygiene, IS, activity and deep breathing  - CPT, mucomyst, duoneb ordered   - RRT called 12/6 for worsening hypoxia and resp distress; subsequently had respiratory arrest with short CPR prior to ROSC. Intubated and transferred back to ICU.   - CXR with persistent left pleural effusion and atelectasis   - Bronch 12/7  - PS during the day time with plan of placing back on full support overnight.   - Did discuss case with ENT, will allow for more time and consider trach start of next week 12/11       Cardiovascular:    #Hx of severe TR s/p TVR  #HFpEF  #Hx of Afib s/p AVN ablation - PPM, upgraded to leadless 7/2023  #Hypertension  #Hyperlipidemia  #Hx of aortic dissection repair 2019  # Respiratory arrest 12/6   # Elevated BNP   Stable paced rhythm with underlying atrial flutter. On review of cardiology, underlying aflutter/afib has been present since at least June of this year. HD stable. No hydralazine use overnight.  Pre-op 9/8/23 echo: LVEF 55/60%, mildly dilated RV, normal function  Echo 12/4: LVEF 60-65%, normal RV function, TV mean gradient 5 mmHg, no TR  - Echo 12/7 with no significant change from previous with the exception of mild reduction of RV. RV free wall dyskinesia   - ASA 81 mg daily  - Holding Metoprolol 12.5 mg BID (PTA 25 mg daily)-- currently requiring vasopressors   - Device RN adjusted PPM to PTA settings rate 60   - Hold PTA spironolactone 25 mg daily  - Hold PTA jardiance  - Warfarin per CVTS, currently on hold 2/2 supra therapeutic INR. Will place on heparin drip for bridge until procedures are completed.    - Pressors: Levo 0.05, wean as able maintaining MAP > 65   - currently on stress dose steroids.   - Ongoing diureses with Bumex, goal to be net negative 1L for 24 hours.   - BNP significantly elevated      GI care /  Nutrition:   #Multiple loose Bms--resolved  #Hx of C. Difficile 9/2023  - Plan for NJT placement and enteral feeds   - prior to reintubation SLP recommended regular diet with thin liquids. Patient with history of dysphagia, possible aspiration event on 12/1  - Many loose bowel movements, improving with holding bowel regimen  - Consider starting oral vancomycin for prophylaxis.   - PPI  - Bowel regimen: MiraLAX, senna--on hold   - OG tube for meds      Renal / Fluids / Electrolytes:   #CKD stage 3  #Lactic acidosis, resolved  #Hypokalemia  #IMAN on CKD   BL creat ~1.2-1.3, most recent creatinine 1.57  FLUID STATUS: Pre-op weight 70.9 kg, weight 12/7 72.9kg  - HOLD PTA spironolactone 25 mg daily  - HOLD PTA torsemide   - Bolus dosing bumex. Will give one time dose of chlorothiazide with a goal to be net negative 1L for 24 hours.    - previously net negative 332ml for 24 hours.   - Replete lytes per protocol  - Strict I/O, daily weights  - Avoid/limit nephrotoxins as able  - Strict I/O, daily weights, avoid/limit nephrotoxins       Endocrine:    #Stress induced hyperglycemia  #T2DM  Hgb A1c 5.7%  - sliding scale insulin q4 hours  - PTA Jardiance on hold, resume once medically optimized   - Goal BG <180 for optimal healing     ID / Antibiotics:  #Stress induced leukocytosis  #Probable aspiration pneumonitis vs pneumonia  WBC 14.2 remains afebrile   - Zosyn since 11/30 for pneumonia.   - 12/6 Given further decompensation, Vanco and meropenem started    - remains on meropenum, goal to evaluate cultures and de-escalate as able.    -Vancomycin stopped 12/7  - Follow up BC x 2, UA and BAL   - Blood culture with NGTD   - urine with Candida and sputum with Yeast, likely both colonizations.   - urine culture with NGTD  - Will not start an antifungal at this time       #Hx of recent C. Difficile  - C. Difficile toxin PCR positive 12/3 with negative antigen and negative toxin - per antimicrobial stewardship team, more likely to be  colonization with recent history and no apparent treatment  - If clinically with persistent diarrhea or abdominal symptoms, would have low threshold to start PO vancomycin   - will start oral vancomycin 2/2 now hypotensive and in the ICU. Persistent leukocytosis.    - Will discontinue once ABX complete   - Monitor fever curve, WBC, and inflammatory markers as appropriate     Heme:     #Acute blood loss anemia  No s/sx active bleeding  - CBC   - Hgb goal > 7.0     MSK / Skin:  #Mini-thoracotomy  #Rib fracture  #Surgical incision  - Mini-thoracotomy precautions  - Incisional cares per protocol  - Sternal precautions, postop incision management protocol  - PT/OT/CR    Prophylaxis:    - VTE: SCD's, warfarin, currently supra therapeutic    - Bowel regimen: PPI, MiraLAX, senna-- currently on hold     Lines/ tubes/ drains:  - ETT  - PICC line   - Arterial Line  - Barrientos- remove today   - OGT  - Will place NJT    Disposition:  - CVICU    ICU Checklist  F - Feeding: Start enteral feeds today   A - Analgesia: prns   S - Sedation: Precedex  T - Thromboembolic prophylaxis: warfarin      H - Head of bed elevated: yes   U - Ulcer prophylaxis: protonix   G - Glycemic control: sliding scale insulin   S - SBT-tolerating.      B - Bowel regimen- currently on hold   I - Indwelling catheter- remove today   D - De-escalation of antibiotics    Patient seen, findings and plan discussed with CVICU staff and cardiothoracic surgeons.    Shari Millan NP  ICU  12/7/2023 at 6:21 AM      ====================================    TODAY'S PROGRESS  SUBJECTIVE  Examined while resting in bed. Remains intubated, and lightly sedated. Able to follow commands in all extremities     OBJECTIVE  1. VITAL SIGNS  Temp:  [98.4  F (36.9  C)-99.7  F (37.6  C)] 99.1  F (37.3  C)  Pulse:  [] 60  Resp:  [10-28] 12  BP: (114-121)/(50-63) 116/50  MAP:  [60 mmHg-110 mmHg] 63 mmHg  Arterial Line BP: ()/(40-85) 124/40  FiO2 (%):  [40 %-50 %] 40 %  SpO2:   [90 %-100 %] 97 %  Vent Mode: CMV/AC  (Continuous Mandatory Ventilation/ Assist Control)  FiO2 (%): (S) 40 %  Resp Rate (Set): 18 breaths/min  Tidal Volume (Set, mL): 390 mL  PEEP (cm H2O): (S) 5 cmH2O (per order)  Resp: 12      2. INTAKE/ OUTPUT  I/O last 3 completed shifts:  In: 1313.42 [I.V.:713.42; NG/GT:600]  Out: 1645 [Urine:1645]    3. PHYSICAL EXAMINATION    General: orally intubated, sedation, follows commands on all extremities   Neuro: VERDIN with equal strength.   Resp: diminished bilateral lower lobes, mechanically ventilated   CV: paced  Abdomen: Soft, non-distended, non-tender  Incisions: thoracotomy incision is cdi  Extremities: warm and well perfused. +1 edema bilaterally         4. INVESTIGATIONS  Arterial Blood Gases   Recent Labs   Lab 12/08/23  0234 12/07/23  0515 12/06/23  2214 12/06/23 2007   PH 7.45 7.38 7.40 7.38   PCO2 28* 37 35 35   PO2 145* 60* 120* 293*   HCO3 19* 22 22 21     Complete Blood Count   Recent Labs   Lab 12/07/23  0128 12/06/23  2210 12/06/23  1905 12/06/23  1735   WBC 14.2* 14.2* 20.6* 18.1*   HGB 9.3* 9.7* 11.2*  11.2* 11.4*    194 321 263     Basic Metabolic Panel  Recent Labs   Lab 12/08/23  0414 12/08/23  0237 12/08/23  0036 12/07/23  2259 12/07/23  2110 12/07/23  2104 12/07/23  1631 12/07/23  1626     141  --  144  --   --  144  --  141   POTASSIUM 4.0  4.0  --  4.3  --   --  4.2  --  5.0  5.0   CHLORIDE 105  105  --  107  --   --  106  --  105   CO2 19*  19*  --  18*  --   --  19*  --  19*   BUN 41.2*  41.2*  --  40.9*  --   --  39.5*  --  40.0*   CR 1.57*  1.57*  --  1.70*  --   --  1.67*  --  1.68*   *  124* 123* 130* 120*   < > 114*   < > 112*    < > = values in this interval not displayed.     Liver Function Tests  Recent Labs   Lab 12/08/23  0414 12/07/23  0409 12/07/23  0128 12/06/23  2210 12/06/23  1905   AST 20 32 32 38  --    ALT 19 23 23 22  --    ALKPHOS 65 66 68 70  --    BILITOTAL 0.3 0.4 0.4 0.4  --    ALBUMIN 3.2* 3.0* 2.9*  "3.1*  --    INR 2.99*  --  3.93* 3.90* 3.52*     Pancreatic Enzymes  No lab results found in last 7 days.  Coagulation Profile  Recent Labs   Lab 12/08/23  0414 12/07/23  0128 12/06/23  2210 12/06/23  1905   INR 2.99* 3.93* 3.90* 3.52*   PTT  --   --  36 83*     Lactate  Invalid input(s): \"LACTATE\"    5. RADIOLOGY  Recent Results (from the past 24 hour(s))   XR Abdomen Port 1 View    Narrative    EXAMINATION:  XR ABDOMEN PORT 1 VIEW 12/6/2023     COMPARISON: CT 2/3/2023, chest x-ray 12/5/2023.    HISTORY: monitor for dilated bowel with ongoing CDiff.    TECHNIQUE: Two frontal supine views of the abdomen.    FINDINGS: Postsurgical changes of the chest and support devices  similar to prior chest radiograph from 12/5/2023 No abnormally dilated  air-filled loops of bowel. Persistent elevation of the right  hemidiaphragm. No pneumatosis or portal venous gas.      Impression    IMPRESSION:     1. No air-filled dilated bowel loop  2. Persistent elevation of the right hemidiaphragm    I have personally reviewed the examination and initial interpretation  and I agree with the findings.    ELIZABETH VILLALBA MD         SYSTEM ID:  [id number]   XR Chest Port 1 View    Narrative    EXAM: Chest radiograph 12/6/2023 4:06 PM    HISTORY: 83 years Female increased SOB.     COMPARISON: Chest x-ray 12/5/2023.    TECHNIQUE: Portable AP view of the chest.    FINDINGS:   Postsurgical changes of the chest. Median sternotomy wires are intact.  Mediastinal surgical clips. Leadless cardiac pacer device overlying  the lower left chest. Tricuspid valve prosthesis. Trachea is  approximately midline. Stable elevation of the right hemidiaphragm.  Mildly increased diffuse interstitial opacities with bilateral  costophrenic angle blunting. No pneumothorax. The visualized upper  abdomen is unremarkable. No acute osseous abnormality.      Impression    IMPRESSION:   1.  Minimally increased diffuse interstitial opacities, likely  represents postsurgical " edema/atelectasis. However, underlying  superimposed infectious etiology cannot be entirely ruled out.  Attention on follow-up.  2.  Postsurgical changes of the chest are stable. Support devices as  above.  3.  Small-to moderate bilateral effusions. Decreased on right.    I have personally reviewed the examination and initial interpretation  and I agree with the findings.    NASEEM RUEDA MD         SYSTEM ID:  [id number]   CT Head w/o Contrast    Impression    RESIDENT PRELIMINARY INTERPRETATION  IMPRESSION: No acute intracranial pathology.      CT Chest Abdomen Pelvis w/o Contrast    Narrative    EXAMINATION: CT CHEST ABDOMEN PELVIS W/O CONTRAST, 12/6/2023 8:56 PM    INDICATION: Shock, sepsis, code blue    COMPARISON STUDY: CT of 2/3/2023    TECHNIQUE: CT scan of the chest, abdomen and pelvis was performed on  multidetector CT scanner using volumetric acquisition technique and  images were reconstructed in multiple planes with variable thickness  and reviewed on dedicated workstations.     CONTRAST: Without.   Without oral contrast.    CT scan radiation dose is optimized to minimum requisite dose using  automated dose modulation techniques.    FINDINGS:    Endotracheal tube terminates at the keegan.    Chest:   Limited noncontrast examination shows postoperative changes from type  A aortic dissection repair with its thin hyperdense dissection flap  extending from the distal aspect of the repair to the abdominal aortic  bifurcation as well as into the left common carotid artery, grossly  unchanged from comparison to prior contrast enhanced examination.    The heart is enlarged. Tricuspid valve prosthesis and leadless pacer  in the right atrium. Trace pericardial effusion. The esophagus is  unremarkable. No thoracic lymphadenopathy.    Trace bilateral pleural effusions. No pneumothorax. Near complete  collapse of the left lower lobe. Subsegmental dependent atelectasis in  the right lower lobe. Additional  scattered groundglass and  consolidative opacities throughout the left upper lobe. Extensive  endobronchial mucous plugging in the left lower lobe. Mild bilateral  interlobular septal thickening.    Abdomen and Pelvis:  Liver: Stable too small to characterize hepatic hypodensities.  Otherwise unremarkable liver.    Biliary System: Cholelithiasis without evidence of acute  cholecystitis. No extrahepatic biliary dilatation.    Pancreas: No mass or pancreatic ductal dilation.    Adrenal glands: No mass or nodules    Spleen: Normal.    Kidneys: Small nonobstructive renal calculi bilaterally, unchanged. No  hydronephrosis.    Gastrointestinal tract: Normal caliber small and large bowel. The  appendix is within normal limits. Colonic diverticulosis without  evidence of acute diverticulitis.    Mesentery/peritoneum/retroperitoneum: Mesenteric edema without free  fluid or free air.    Lymph nodes: No significant lymphadenopathy.    Pelvis: Urinary bladder is normal. Uterus and adnexa within normal  limits.    Osseous structures: Diffuse osteopenia with L2 level degenerative  changes of the spine and T12, L1 compression deformities, unchanged.  Acute mildly displaced and nondisplaced anterolateral right third,  fourth, and fifth and left fourth and fifth rib fractures.    Soft tissues: Diffuse subcutaneous anasarca.  Presumed chest wall  hematoma overlying the fractured right ribs, measuring 1.5 cm in  thickness.  3.5 cm collection in the right groin, likely a post  vascular access hematoma.  Hyperattenuating enlargement of the left  pectineus muscle.        Impression    IMPRESSION:     1. Limited noncontrast examination with grossly stable appearance of  type A aortic dissection repair with dissection flap extending from  the distal aspect of the repair through the abdominal aortic  bifurcation.  2. Acute mildly displaced and nondisplaced right 3-5 rib fractures  with associated right chest wall hematoma and  nondisplaced  anterolateral left fourth and fifth rib fractures, presumably related  to cardiopulmonary resuscitative efforts.   3. Near complete atelectasis of the left lower lobe associated with  extensive and bronchial mucous plugging; consider aspiration.  Subsegmental atelectasis of the right lower lobe. Additional scattered  groundglass and consolidative opacities throughout the left upper lobe  may represent edema and/or infection.  4. Trace bilateral pleural effusions.  5. 3.5 cm right groin collection, presumably a post vascular access  hematoma.  5. Left pectineus intramuscular hematoma.  6. No acute intra-abdominal pathology suspected.  7. Endotracheal tube terminates at the keegan. Recommend retraction.    I have personally reviewed the examination and initial interpretation  and I agree with the findings.    ADRIANNA GAYTAN DO         SYSTEM ID:  [id number]   XR Chest Port 1 View    Impression    RESIDENT PRELIMINARY INTERPRETATION  Impression:   1. New endotracheal tube with tip in the mid thoracic trachea.  2. Increased left lung opacity is represent atelectasis, and edema.  3. Improved aeration of the right lung.  4. Small left greater than right pleural effusions.   XR Abdomen Port 1 View    Impression    RESIDENT PRELIMINARY INTERPRETATION  IMPRESSION: Enteric tube is in the stomach..    XR Chest Port 1 View    Narrative    Portable chest 12/7/2023 at 0106 hours    Indication: Daily monitoring. Short of breath.    COMPARISON: Yesterday 2259 hours    FINDINGS: Previous pacemaker. Median sternotomy. Endotracheal  intubation with tube tip approximately 4.2 cm above the keegan. No  ectopic air. Patchy densities especially in the left lung with left  costophrenic angle blunting appears similar in the short interval.  Retrocardiac density prominence and silhouetting the left  hemidiaphragm unchanged in the short interval.      Impression    IMPRESSION: Continued left pleural effusion with  retrocardiac  atelectasis and perihilar edema.    NASEEM RUEDA MD         SYSTEM ID:  [id number]

## 2023-12-08 NOTE — PROGRESS NOTES
CLINICAL NUTRITION SERVICES - BRIEF NOTE      Reason for RD note: Initiating EN    New Findings/Chart Review:  -Pt re-intubated on 12/6, plan for trach  -RD placed post-pyloric FT today    Interventions:  -Once FT placement confirmed by AXR, initiate EN:  Osmolite 1.5 Adan (or equivalent) @ goal of 45ml/hr (1080ml/day) + 1 pkt Prosource TF20 provides: 1700 kcals (30 kcal/kg), 87 g PRO (1.5 g/kg), 822 ml free H20, 219 g CHO, and 0 g fiber daily.  - Initiate @ 15 ml/hr and advance by 15 ml q8hr as tolerated  - Do not start or advance until lytes (Mg++,K+) WNL and phos>2.0  - Recommend 30 ml q4hr fluid flushes for tube patency. Additional fluids and/or adjustments per MD.    - Order multivitamin/mineral (15 ml/day via FT) to help ensure micronutrient needs being met with suspected hypermetabolic demands and potential interruptions to TF infusions.    Nutrition will follow per protocol or sooner if consulted.    Radha Foley, MS, RD, LD  4A (CVICU) RD pager: [phone]  Ascom: [phone]  Weekend/Holiday RD pager: [phone]

## 2023-12-08 NOTE — PLAN OF CARE
Major Shift Events:  A/O and expresses her needs by writing on a piece of paper and nodding  to yes and no questions. Peep decreased to 5 and and she tolerated it well. 100% V-paced  with underline Aflutter rhythm with rate of 60's. No changes noted overnight. Levo titrated back and forth and now at 0.06, unable to tolerate weaning due to low diastolic blood pressures that keeps MAP<65. Precedex remains unchanged at 0.2.  Pt had adequate UOP during the shift but borderline towards morning.   Plan: PST, monitor Vitals  For vital signs and complete assessments, please see documentation flowsheets.     Goal Outcome Evaluation:      Plan of Care Reviewed With: patient    Overall Patient Progress: improvingOverall Patient Progress: improving    Outcome Evaluation: Pt A/O, tolerating vent, peep decreased  from 6 to 5.

## 2023-12-08 NOTE — CONSULTS
Otolaryngology Consult Note  December 8, 2023      CC: trach placement    HPI: Priya Funez is a 83 year old female with a past medical history of HFpEF, severe TR, permanent AF s/p AVN ablation (leadless PPM 7/31/23), emergent aortic dissection repair 1/4/19, HTN, CKD, NEDA, and obesity who presented as an outpatient for elective TVR. Patient is now s/p mini TVR on 11/28/2023 with Dr. Mabry. Transferred to floor 12/2. RRT called 12/6 for worsening hypoxia and resp distress; subsequently had respiratory arrest with short CPR prior to ROSC. Intubated and transferred back to ICU.   ENT was consulted for tracheostomy placement due to concerns for mucus plugging prior to arrest, now with rib fractures that will further impede ability to manage respiratory secretions. No plans for extubation trial at this time per primary team. She is being treated for a probable aspiration pneumonitis vs pneumonia with meropenem.     Patient had a prior tracheostomy in 2019 (placed by thoracic surgery at [facility]) after her aortic dissection repair. Trach was decannulated 5/30/19. She was followed in laryngology clinic, last seen 7/30/19 by Dr. Leger, and at that time her vocal cords were mobile, swallowing and voice were at baseline.     Past Medical History:   Diagnosis Date    Benign essential hypertension     Cardiac pacemaker in situ     [employer]tr[employer]    Chronic atrial fibrillation (H)     s/p AV node ablation and PPM placement January, 2019    Chronic diastolic heart failure (H)     Chronic kidney disease, stage III (moderate) (H)     Chronic right-sided heart failure (H)     NEDA (generalized anxiety disorder)     History of aortic dissection     s/p emergent repair 2019    History of blood transfusion     no adverse reactions    Severe tricuspid regurgitation        Past Surgical History:   Procedure Laterality Date    ANGIOGRAM Right 01/04/2019    Procedure: DIAGONSTIC ANGIOGRAM OF SMA;  Surgeon: Rigo Jennings MD;   Location:  OR    BIOPSY BREAST      BYPASS GRAFT ARTERY CORONARY, REPAIR VALVE AORTIC, COMBINED N/A 01/04/2019    Procedure: AORTIC DISSECTION REPAIR WITH GRAFT- HEMASHIELD PLATINUM WOVEN DOUBLE VELOR 4 SIDE ARM VASCULAR GRAFT, D:35 X 12 X 10 X 10MM/ L:50CM;  Surgeon: Jose Winslow MD;  Location:  OR    CV CORONARY ANGIOGRAM N/A 08/29/2023    Procedure: Coronary Angiogram;  Surgeon: Ar Morales MD;  Location:  HEART CARDIAC CATH LAB    CV PCI N/A 08/29/2023    Procedure: Percutaneous Coronary Intervention;  Surgeon: Ar Morales MD;  Location:  HEART CARDIAC CATH LAB    CV RIGHT HEART CATH MEASUREMENTS RECORDED N/A 08/29/2023    Procedure: Right Heart Catheterization;  Surgeon: Ar Morales MD;  Location:  HEART CARDIAC CATH LAB    EP ABLATION AV NODE N/A 01/11/2019    Procedure: EP Ablation AV Node;  Surgeon: Tsering Davey MD;  Location:  HEART CARDIAC CATH LAB    EP LEAD EXTRACTION  07/31/2023    EP PACEMAKER Left 01/11/2019    Procedure: EP Pacemaker;  Surgeon: Tsering Davey MD;  Location:  HEART CARDIAC CATH LAB    EP PACEMAKER LEADLESS DEVICE IMPLANT  07/31/2023    EP PACEMAKER LEADLESS DEVICE IMPLANT N/A 07/31/2023    Procedure: Pacemaker Leadless Device Implant;  Surgeon: Gaston Car MD;  Location: [facility] OR    PICC INSERTION - TRIPLE LUMEN Right 12/07/2023    right basilic 5 fr tl power picc 40 cm    REPAIR PATENT FORAMEN OVALE N/A 11/28/2023    Procedure: patent foramen ovale closure;  Surgeon: Joyce Mabry MD;  Location: [facility] OR    REPAIR VALVE TRICUSPID MINIMALLY INVASIVE N/A 11/28/2023    Procedure: Right thoracotomy on cardiopulmonary bypass. Minimally Invasive Tricuspid Valve replacement using a 31mm Abbott Epic Plus Mitral Valve.  Intraoperative Transesophageal Echocardiogram (ALEX) per Anesthesia.;  Surgeon: Joyce Mabry MD;  Location: [facility] OR    THYROID SURGERY      benign mass removed    TRACHEOSTOMY N/A 01/25/2019     Procedure: TRACHEOSTOMY  (DR MCCLELLAND);  Surgeon: Bryson Mcclelland MD;  Location:  OR    TRANSESOPHAGEAL ECHOCARDIOGRAM INTRAOPERATIVE N/A 6/16/2023    Procedure: ECHOCARDIOGRAM,TRANSESOPHAGEAL,WITH ANESTHESIA;  Surgeon: Dennis Meier DO;  Location: [facility] OR    TRANSESOPHAGEAL ECHOCARDIOGRAM INTRAOPERATIVE N/A 09/08/2023    Procedure: ECHOCARDIOGRAM,TRANSESOPHAGEAL,WITH ANESTHESIA;  Surgeon: Rios Heredia MD;  Location: [facility] OR    TUBAL LIGATION         No current outpatient medications on file.          Allergies   Allergen Reactions    Bactrim [Sulfamethoxazole-Trimethoprim]      pancytopenia    Amoxicillin Swelling     Face and body    Ciprofloxacin Hives       Social History     Socioeconomic History    Marital status:      Spouse name: Not on file    Number of children: Not on file    Years of education: Not on file    Highest education level: Not on file   Occupational History    Occupation: Retired - customer service   Tobacco Use    Smoking status: Never    Smokeless tobacco: Never   Vaping Use    Vaping Use: Never used   Substance and Sexual Activity    Alcohol use: Never    Drug use: Never    Sexual activity: Not on file   Other Topics Concern    Parent/sibling w/ CABG, MI or angioplasty before 65F 55M? Not Asked   Social History Narrative    .    Lives in home with . Fully independent.    4 adult children.    8 grandchildren.    Walks 2-3x/week.     Social Determinants of Health     Financial Resource Strain: Low Risk  (10/10/2023)    Financial Resource Strain     Within the past 12 months, have you or your family members you live with been unable to get utilities (heat, electricity) when it was really needed?: No   Food Insecurity: Low Risk  (10/10/2023)    Food Insecurity     Within the past 12 months, did you worry that your food would run out before you got money to buy more?: No     Within the past 12 months, did the food you bought just not  "last and you didn t have money to get more?: No   Transportation Needs: Low Risk  (10/10/2023)    Transportation Needs     Within the past 12 months, has lack of transportation kept you from medical appointments, getting your medicines, non-medical meetings or appointments, work, or from getting things that you need?: No   Physical Activity: Not on file   Stress: Not on file   Social Connections: Not on file   Interpersonal Safety: Not on file   Housing Stability: Low Risk  (10/10/2023)    Housing Stability     Do you have housing? : Yes     Are you worried about losing your housing?: No       Family History   Problem Relation Age of Onset    Ataxia Mother     Heart Disease Father     Diabetes No family hx of     Myocardial Infarction No family hx of     Cerebrovascular Disease No family hx of     Coronary Artery Disease Early Onset No family hx of     Breast Cancer No family hx of     Colon Cancer No family hx of     Ovarian Cancer No family hx of     Anesthesia Reaction No family hx of     Deep Vein Thrombosis (DVT) No family hx of        ROS: 12 point review of systems is negative unless noted in HPI.    PHYSICAL EXAM:  General: laying in bed, no acute distress  /65   Pulse 61   Temp 100  F (37.8  C)   Resp 30   Ht 1.613 m (5' 3.5\")   Wt 72.9 kg (160 lb 11.5 oz)   SpO2 100%   BMI 28.02 kg/m    HEAD: normocephalic, atraumatic  Face: symmetrical,  no swelling, edema, or erythema.   Eyes: EOMI, clear sclera  Ears: external auricles appear normal  Nose: no anterior drainage  Mouth: orotracheally intubated, oral mucosa moist, tongue midline and symmetric  Neck: prior trach scar over anterior trachea adherent to trachea, no LAD, trachea midline, no bounding pulse over the trachea or sternal notch  Neuro: cranial nerves 2-12 grossly intact  Respiratory: pressure support via vent, FiO2 40%, 5/7  Skin: no rashes or skin lesions of the face/neck  Psych: pleasant affect, writing for communication  Cardio: " extremities warm and well perfused     ROUTINE IP LABS (Last four results)  BMP  Recent Labs   Lab 12/08/23  1124 12/08/23  1123 12/08/23  0840 12/08/23  0629 12/08/23  0414 12/08/23  0237 12/08/23  0036 12/07/23  2110 12/07/23  2104   NA  --  144  --   --  141  141  --  144  --  144   POTASSIUM  --  4.3  --   --  4.0  4.0  --  4.3  --  4.2   CHLORIDE  --  109*  --   --  105  105  --  107  --  106   BESS  --  8.7*  --   --  8.6*  8.6*  --  8.6*  --  8.5*   CO2  --  21*  --   --  19*  19*  --  18*  --  19*   BUN  --  43.7*  --   --  41.2*  41.2*  --  40.9*  --  39.5*   CR  --  1.59*  --   --  1.57*  1.57*  --  1.70*  --  1.67*   * 120* 122* 124* 124*  124*   < > 130*   < > 114*    < > = values in this interval not displayed.     CBC  Recent Labs   Lab 12/08/23 0414 12/07/23 0128 12/06/23 2210 12/06/23 1905   WBC 16.7* 14.2* 14.2* 20.6*   RBC 3.31* 3.29* 3.35* 3.94   HGB 9.4* 9.3* 9.7* 11.2*  11.2*   HCT 28.9* 29.8* 32.4* 39.0  33*   MCV 87 91 97 99   MCH 28.4 28.3 29.0 28.4   MCHC 32.5 31.2* 29.9* 28.7*   RDW 15.9* 15.5* 15.6* 15.7*    183 194 321     INR  Recent Labs   Lab 12/08/23  0414 12/07/23  0128 12/06/23 2210 12/06/23  1905   INR 2.99* 3.93* 3.90* 3.52*       Assessment and Plan  Priyabereket Funez is a 83 year old female with a past medical history of HFpEF, severe TR, permanent AF s/p AVN ablation (leadless PPM 7/31/23), emergent aortic dissection repair 1/4/19, HTN, CKD, NEDA, and obesity, now s/p mini TVR on 11/28/2023 with respiratory arrest 12/6 requiring CPR and reintubation. ENT was consulted for tracheostomy placement. Of note, she is on warfarin at baseline, currently held for supratherapeutic INR. Plan to hold warfarin and allow INR to normalize. Once INR below therapeutic level plan to start heparin drip, which can be held for surgery.     - wean vasopressor as able  - Allow INR to normalize, ok to bridge w/ heparin drip. Will need heparin drip held for 6 hours prior to  surgery and ideally at least 24h post-op  - will re-evaluate next week regarding pulmonary status and appropriateness for surgery   - Remainder of care per primary team    - Patient and above plan to be discussed with Dr. Barker.     oSni Hannah PA-C  Otolaryngology-Head & Neck Surgery  Please page ENT with questions by dialing [phone] and entering job [phone] when prompted.

## 2023-12-08 NOTE — PROCEDURES
Small Bowel Feeding Tube Placement Assessment  Reason for Feeding Tube Placement: post pyloric acces needed for meds & nutrition  Cortrak Start Time: 1345   Cortrak End Time: 1415  Medicine Delivered During Procedure: Xylocaine gel   Placement Successful: yes per cortracaleb tracing  Procedure Complications: none  Final Placement Francesco at exit of nare 89 cm  Face to Face time with patient: 30 minutes     Bridle Placement:   Reason for bridle placement: Securement of feeding tube   Medicine delivered during procedure: lubricating jelly Xylocaine lidocaine gel   Procedure: Successful yes  Location of top of clip on FT: @ 90 cm marker   Condition of nose/skin at time of bridle placement: Unremarkable   Face to Face time with patient: less than 5 minutes.

## 2023-12-09 NOTE — PLAN OF CARE
Major Shift Events:  no major shift event 7p-7a, neuro intact, visiting with family at start of shift, on PS then transition to full vent support for overnight, multiple loose bowel movements mixed with unmeasured urine, multiple saturated pads, rectal tube place d/t skin excoriation to perineum, TF increased per order,   Plan: trach on Monday, 12/11/23  For vital signs and complete assessments, please see documentation flowsheets.

## 2023-12-09 NOTE — PROGRESS NOTES
Major Shift Events:  Aox4, communicates through writing. Up to chair w/ Ax2. Norepi titrated to achieve MAP > 65, unable to completely wean. Midodrine initiated. Tolerated PS 7/5 40% all day. Rectal tube patent. Purewick w/ several unmeasured voids. Diuresed w/ bumex & diuril. K replaced.    Plan: Trach Monday  For vital signs and complete assessments, please see documentation flowsheets.

## 2023-12-09 NOTE — PROGRESS NOTES
CV ICU PROGRESS NOTE  December 9, 2023   Priya Funez  [id number]  Admitted: 11/28/2023 10:08 AM      ASSESSMENT: Priya Funez is a 83 year old female with a PMH of HFpEF, severe TR, permanent AF s/p AVN ablation with PPM implant 1/11/19 s/p extraction TV (transvenous) PPM and implant leadless PPM 7/31/23, emergent aortic dissection repair 1/4/19, HTN, CKD, NEDA, and obesity who presented as an outpatient for elective TVR. Patient is now s/p mini TVR on 11/28/2023 with Dr. Mabry. Transferred to floor 12/2. RRT called 12/6 for worsening hypoxia and resp distress; subsequently had respiratory arrest with short CPR prior to ROSC. Intubated and transferred back to ICU.      CO-MORBIDITIES:   S/P TVR (tricuspid valve replacement)  (primary encounter diagnosis)  Complete heart block (H)  Displacement of atrial pacemaker leads, initial encounter    PLAN:  Neuro/ pain/ sedation:  - Monitor neurological status. Notify the MD for any acute changes in exam.  #Acute postoperative pain  - PRN: Tylenol, oxycodone, Fentanyl   #Sedation  - Gtt:precedex, will use at night time only.   - RASS goal 0  Awake, writing messages and interacting with staff.   - Head CT w/o intracranial pathologies 12/6      Hx of general anxiety disorder  - PTA Lexapro and Remeron  - PRN Atarax      Pulmonary care:   #Mechanical ventilation  #Acute hypercapnic and hypoxic respiratory failure requiring BiPAP  #Aspiration pneumonitis vs pneumonia  #Possible aspiration event 12/1  #Acute respiratory failure 12/6/23   #Near complete plugging of left lower lobe on CT   #Acute mildly displaced and nondisplaced right 3-5 rib fractures  with associated right chest wall hematoma and nondisplaced  anterolateral left fourth and fifth rib fractures  Vent Mode: CMV/AC  (Continuous Mandatory Ventilation/ Assist Control)  FiO2 (%): 40 %  Resp Rate (Set): 18 breaths/min  Tidal Volume (Set, mL): 390 mL  PEEP (cm H2O): 5 cmH2O  Pressure Support (cm H2O): 7  cmH2O  Resp: 24  - Pulm hygiene, IS, activity and deep breathing  - CPT, mucomyst, duoneb ordered   - RRT called 12/6 for worsening hypoxia and resp distress; subsequently had respiratory arrest with short CPR prior to ROSC. Intubated and transferred back to ICU.   - CXR with slightly improved aeration bilaterally   - Bronch 12/7  - PS during the day time with plan of placing back on full support overnight.   - Did discuss case with ENT, will allow for more time and consider trach start of next week 12/11 and once INR has normalized        Cardiovascular:    #Hx of severe TR s/p TVR  #HFpEF  #Hx of Afib s/p AVN ablation - PPM, upgraded to leadless 7/2023  #Hypertension  #Hyperlipidemia  #Hx of aortic dissection repair 2019  # Respiratory arrest 12/6   # Elevated BNP   Stable paced rhythm with underlying atrial flutter. On review of cardiology, underlying aflutter/afib has been present since at least June of this year.   Pre-op 9/8/23 echo: LVEF 55/60%, mildly dilated RV, normal function  Echo 12/4: LVEF 60-65%, normal RV function, TV mean gradient 5 mmHg, no TR  - Echo 12/7 with no significant change from previous with the exception of mild reduction of RV. RV free wall dyskinesia   - ASA 81 mg daily  - Holding Metoprolol 12.5 mg BID (PTA 25 mg daily)-- currently requiring vasopressors   - Device RN adjusted PPM to PTA settings rate 60   - Hold PTA spironolactone 25 mg daily  - Hold PTA jardiance  - Warfarin per CVTS, currently on hold 2/2 supra therapeutic INR. Will place on heparin drip for bridge once INR is below 2, will remain on heparin gtt until procedures are completed.    - Pressors: Levo 0.03, wean as able maintaining MAP > 65, will start midodrine for persistent hypotension   - currently on stress dose steroids, started taper 12/9   - Ongoing diureses with Bumex, goal to be net even for the next 24 hours   - BNP significantly elevated, repeat down trending at >22K      GI care / Nutrition:   #Multiple  loose Bms  #Hx of C. Difficile 9/2023  - NJT and enteral feeds   - prior to reintubation SLP recommended regular diet with thin liquids. Patient with history of dysphagia, possible aspiration event on 12/1  - oral vancomycin for prophylaxis.   - PPI- with change Protonix to famotidine   - Bowel regimen on hold secondary to loose stools.  - start Banatrol      Renal / Fluids / Electrolytes:   #CKD stage 3  #Lactic acidosis, resolved  #Hypokalemia  #IMAN on CKD   BL creat ~1.2-1.3, most recent creatinine 1.38  FLUID STATUS: Pre-op weight 70.9 kg, current weight 72.5kg  - HOLD PTA spironolactone 25 mg daily  - HOLD PTA torsemide   - Bolus dosing bumex and prn chlorotiazide. Goal to be euvolemic to net even   - Replete lytes per protocol  - Avoid/limit nephrotoxins as able  - Strict I/O, daily weights, avoid/limit nephrotoxins     Endocrine:    #Stress induced hyperglycemia  #T2DM  Hgb A1c 5.7%  - sliding scale insulin q4 hours  - PTA Jardiance on hold, resume once medically optimized   - Goal BG <180 for optimal healing     ID / Antibiotics:  #Stress induced leukocytosis  #Probable aspiration pneumonitis vs pneumonia  WBC 11.9 remains afebrile   - 11/30 Zosyn started for Pneumonia  - 12/6 Given further decompensation, Vanco and meropenem started    - remains on meropenum, will complete 3 days this evening, will evaluate off of abx. Monitor fever curve, leukocytosis    -Vancomycin stopped 12/7  - Follow up BC x 2, UA and BAL   - Blood culture with NGTD   - urine with Candida and sputum with Yeast, likely both colonizations.   - urine culture with NGTD  - Will not start an antifungal at this time       #Hx of recent C. Difficile  - C. Difficile toxin PCR positive 12/3 with negative antigen and negative toxin - per antimicrobial stewardship team, more likely to be colonization with recent history and no apparent treatment   - oral vancomycin 2/2 now hypotensive and in the ICU. Persistent leukocytosis.    - Will discontinue  once ABX complete   - Monitor fever curve, WBC, and inflammatory markers as appropriate     Heme:     #Acute blood loss anemia  No s/sx active bleeding  - CBC   - Hgb goal > 7.0     MSK / Skin:  #Mini-thoracotomy  #Rib fracture  #Surgical incision  - Mini-thoracotomy precautions  - Incisional cares per protocol  - Sternal precautions, postop incision management protocol  - PT/OT/CR    Prophylaxis:    - VTE: SCD's, warfarin, currently supra therapeutic    - Bowel regimen: PPI, MiraLAX, senna-- currently on hold     Lines/ tubes/ drains:  - ETT  - PICC line   - Arterial Line  - Rectal Tube   - NJT    Disposition:  - [facility]    ICU Checklist  F - Feeding: Start enteral feeds today   A - Analgesia: prns   S - Sedation: Precedex  T - Thromboembolic prophylaxis: warfarin      H - Head of bed elevated: yes   U - Ulcer prophylaxis: famotidine   G - Glycemic control: sliding scale insulin   S - SBT-tolerating.      B - Bowel regimen- currently on hold   I - Indwelling catheter- remove today   D - De-escalation of antibiotics    Patient seen, findings and plan discussed with CVICU staff and cardiothoracic surgeons.    hSari Millan NP  ICU    ====================================    TODAY'S PROGRESS  SUBJECTIVE  Examined while resting in bed. Remains intubated, and lightly sedated. Able to follow commands in all extremities and writes questions     OBJECTIVE  1. VITAL SIGNS  Temp:  [93.2  F (34  C)-100  F (37.8  C)] 98  F (36.7  C)  Pulse:  [] 66  Resp:  [8-37] 24  MAP:  [53 mmHg-300 mmHg] 56 mmHg  Arterial Line BP: ()/() 103/37  FiO2 (%):  [40 %] 40 %  SpO2:  [90 %-100 %] 99 %  Vent Mode: CMV/AC  (Continuous Mandatory Ventilation/ Assist Control)  FiO2 (%): 40 %  Resp Rate (Set): 18 breaths/min  Tidal Volume (Set, mL): 390 mL  PEEP (cm H2O): 5 cmH2O  Pressure Support (cm H2O): 7 cmH2O  Resp: 24      2. INTAKE/ OUTPUT  I/O last 3 completed shifts:  In: 1041.5 [I.V.:441.5; NG/GT:510]  Out: 755  [Urine:755]    3. PHYSICAL EXAMINATION    General: orally intubated, follows commands on all extremities alert and oriented   Neuro: VERDIN with equal strength.   Resp: diminished bilateral lower lobes, mechanically ventilated   CV: paced  Abdomen: Soft, non-distended, non-tender  Incisions: thoracotomy incision is cdi  Extremities: warm and well perfused. +1 edema bilaterally         4. INVESTIGATIONS  Arterial Blood Gases   Recent Labs   Lab 12/08/23  0234 12/07/23  0515 12/06/23  2214 12/06/23 2007   PH 7.45 7.38 7.40 7.38   PCO2 28* 37 35 35   PO2 145* 60* 120* 293*   HCO3 19* 22 22 21     Complete Blood Count   Recent Labs   Lab 12/09/23  0338 12/08/23  0414 12/07/23  0128 12/06/23 2210   WBC 11.9* 16.7* 14.2* 14.2*   HGB 9.4* 9.4* 9.3* 9.7*    302 183 194     Basic Metabolic Panel  Recent Labs   Lab 12/09/23  0341 12/09/23 0338 12/09/23  0053 12/08/23 2007 12/08/23 2002 12/08/23  1124 12/08/23  1123 12/08/23  0629 12/08/23 0414   NA  --  150*  150*  --   --  146*  --  144  --  141  141   POTASSIUM  --  2.9*  2.9*  2.9*  --   --  3.3*  --  4.3  --  4.0  4.0   CHLORIDE  --  110*  110*  --   --  110*  --  109*  --  105  105   CO2  --  25  25  --   --  25  --  21*  --  19*  19*   BUN  --  49.4*  49.4*  --   --  49.8*  --  43.7*  --  41.2*  41.2*   CR  --  1.38*  1.38*  --   --  1.50*  --  1.59*  --  1.57*  1.57*   * 140*  140* 131* 148* 145*   < > 120*   < > 124*  124*    < > = values in this interval not displayed.     Liver Function Tests  Recent Labs   Lab 12/09/23 0338 12/08/23 0414 12/07/23  0409 12/07/23  0128 12/06/23  2210   AST 25 20 32 32 38   ALT 20 19 23 23 22   ALKPHOS 65 65 66 68 70   BILITOTAL 0.3 0.3 0.4 0.4 0.4   ALBUMIN 3.2* 3.2* 3.0* 2.9* 3.1*   INR 2.51* 2.99*  --  3.93* 3.90*     Pancreatic Enzymes  No lab results found in last 7 days.  Coagulation Profile  Recent Labs   Lab 12/09/23  0338 12/08/23  0414 12/07/23  0128 12/06/23  2210 12/06/23  1905   INR 2.51*  "2.99* 3.93* 3.90* 3.52*   PTT  --   --   --  36 83*     Lactate  Invalid input(s): \"LACTATE\"    5. RADIOLOGY  Recent Results (from the past 24 hour(s))   XR Abdomen Port 1 View    Narrative    EXAMINATION:  XR ABDOMEN PORT 1 VIEW 12/6/2023     COMPARISON: CT 2/3/2023, chest x-ray 12/5/2023.    HISTORY: monitor for dilated bowel with ongoing CDiff.    TECHNIQUE: Two frontal supine views of the abdomen.    FINDINGS: Postsurgical changes of the chest and support devices  similar to prior chest radiograph from 12/5/2023 No abnormally dilated  air-filled loops of bowel. Persistent elevation of the right  hemidiaphragm. No pneumatosis or portal venous gas.      Impression    IMPRESSION:     1. No air-filled dilated bowel loop  2. Persistent elevation of the right hemidiaphragm    I have personally reviewed the examination and initial interpretation  and I agree with the findings.    ELIZABETH VILLALBA MD         SYSTEM ID:  [id number]   XR Chest Port 1 View    Narrative    EXAM: Chest radiograph 12/6/2023 4:06 PM    HISTORY: 83 years Female increased SOB.     COMPARISON: Chest x-ray 12/5/2023.    TECHNIQUE: Portable AP view of the chest.    FINDINGS:   Postsurgical changes of the chest. Median sternotomy wires are intact.  Mediastinal surgical clips. Leadless cardiac pacer device overlying  the lower left chest. Tricuspid valve prosthesis. Trachea is  approximately midline. Stable elevation of the right hemidiaphragm.  Mildly increased diffuse interstitial opacities with bilateral  costophrenic angle blunting. No pneumothorax. The visualized upper  abdomen is unremarkable. No acute osseous abnormality.      Impression    IMPRESSION:   1.  Minimally increased diffuse interstitial opacities, likely  represents postsurgical edema/atelectasis. However, underlying  superimposed infectious etiology cannot be entirely ruled out.  Attention on follow-up.  2.  Postsurgical changes of the chest are stable. Support devices " as  above.  3.  Small-to moderate bilateral effusions. Decreased on right.    I have personally reviewed the examination and initial interpretation  and I agree with the findings.    NASEEM RUEDA MD         SYSTEM ID:  [id number]   CT Head w/o Contrast    Impression    RESIDENT PRELIMINARY INTERPRETATION  IMPRESSION: No acute intracranial pathology.      CT Chest Abdomen Pelvis w/o Contrast    Narrative    EXAMINATION: CT CHEST ABDOMEN PELVIS W/O CONTRAST, 12/6/2023 8:56 PM    INDICATION: Shock, sepsis, code blue    COMPARISON STUDY: CT of 2/3/2023    TECHNIQUE: CT scan of the chest, abdomen and pelvis was performed on  multidetector CT scanner using volumetric acquisition technique and  images were reconstructed in multiple planes with variable thickness  and reviewed on dedicated workstations.     CONTRAST: Without.   Without oral contrast.    CT scan radiation dose is optimized to minimum requisite dose using  automated dose modulation techniques.    FINDINGS:    Endotracheal tube terminates at the keegan.    Chest:   Limited noncontrast examination shows postoperative changes from type  A aortic dissection repair with its thin hyperdense dissection flap  extending from the distal aspect of the repair to the abdominal aortic  bifurcation as well as into the left common carotid artery, grossly  unchanged from comparison to prior contrast enhanced examination.    The heart is enlarged. Tricuspid valve prosthesis and leadless pacer  in the right atrium. Trace pericardial effusion. The esophagus is  unremarkable. No thoracic lymphadenopathy.    Trace bilateral pleural effusions. No pneumothorax. Near complete  collapse of the left lower lobe. Subsegmental dependent atelectasis in  the right lower lobe. Additional scattered groundglass and  consolidative opacities throughout the left upper lobe. Extensive  endobronchial mucous plugging in the left lower lobe. Mild bilateral  interlobular septal  thickening.    Abdomen and Pelvis:  Liver: Stable too small to characterize hepatic hypodensities.  Otherwise unremarkable liver.    Biliary System: Cholelithiasis without evidence of acute  cholecystitis. No extrahepatic biliary dilatation.    Pancreas: No mass or pancreatic ductal dilation.    Adrenal glands: No mass or nodules    Spleen: Normal.    Kidneys: Small nonobstructive renal calculi bilaterally, unchanged. No  hydronephrosis.    Gastrointestinal tract: Normal caliber small and large bowel. The  appendix is within normal limits. Colonic diverticulosis without  evidence of acute diverticulitis.    Mesentery/peritoneum/retroperitoneum: Mesenteric edema without free  fluid or free air.    Lymph nodes: No significant lymphadenopathy.    Pelvis: Urinary bladder is normal. Uterus and adnexa within normal  limits.    Osseous structures: Diffuse osteopenia with L2 level degenerative  changes of the spine and T12, L1 compression deformities, unchanged.  Acute mildly displaced and nondisplaced anterolateral right third,  fourth, and fifth and left fourth and fifth rib fractures.    Soft tissues: Diffuse subcutaneous anasarca.  Presumed chest wall  hematoma overlying the fractured right ribs, measuring 1.5 cm in  thickness.  3.5 cm collection in the right groin, likely a post  vascular access hematoma.  Hyperattenuating enlargement of the left  pectineus muscle.        Impression    IMPRESSION:     1. Limited noncontrast examination with grossly stable appearance of  type A aortic dissection repair with dissection flap extending from  the distal aspect of the repair through the abdominal aortic  bifurcation.  2. Acute mildly displaced and nondisplaced right 3-5 rib fractures  with associated right chest wall hematoma and nondisplaced  anterolateral left fourth and fifth rib fractures, presumably related  to cardiopulmonary resuscitative efforts.   3. Near complete atelectasis of the left lower lobe associated  with  extensive and bronchial mucous plugging; consider aspiration.  Subsegmental atelectasis of the right lower lobe. Additional scattered  groundglass and consolidative opacities throughout the left upper lobe  may represent edema and/or infection.  4. Trace bilateral pleural effusions.  5. 3.5 cm right groin collection, presumably a post vascular access  hematoma.  5. Left pectineus intramuscular hematoma.  6. No acute intra-abdominal pathology suspected.  7. Endotracheal tube terminates at the keegan. Recommend retraction.    I have personally reviewed the examination and initial interpretation  and I agree with the findings.    ADRIANNA GAYTAN DO         SYSTEM ID:  [id number]   XR Chest Port 1 View    Impression    RESIDENT PRELIMINARY INTERPRETATION  Impression:   1. New endotracheal tube with tip in the mid thoracic trachea.  2. Increased left lung opacity is represent atelectasis, and edema.  3. Improved aeration of the right lung.  4. Small left greater than right pleural effusions.   XR Abdomen Port 1 View    Impression    RESIDENT PRELIMINARY INTERPRETATION  IMPRESSION: Enteric tube is in the stomach..    XR Chest Port 1 View    Narrative    Portable chest 12/7/2023 at 0106 hours    Indication: Daily monitoring. Short of breath.    COMPARISON: Yesterday 2259 hours    FINDINGS: Previous pacemaker. Median sternotomy. Endotracheal  intubation with tube tip approximately 4.2 cm above the keegan. No  ectopic air. Patchy densities especially in the left lung with left  costophrenic angle blunting appears similar in the short interval.  Retrocardiac density prominence and silhouetting the left  hemidiaphragm unchanged in the short interval.      Impression    IMPRESSION: Continued left pleural effusion with retrocardiac  atelectasis and perihilar edema.    NASEEM RUEDA MD         SYSTEM ID:  [id number]

## 2023-12-09 NOTE — PLAN OF CARE
ICU End of Shift Summary. See flowsheets for vital signs and detailed assessment.    Changes this shift: Pt A&Ox4, RASS 0 to 2. Answers yes/no questions appropriately and is able to communicate through writing. Denies pain. RTMax: 99.9. Remains in aflutter, care team aware. Levo titrated to maintain MAPs > 65. ETT advanced and reconfirmed with xray. Tolerated P/S 7/5, 40% all day. Prolonged coughing episodes with tachycardia and increased in-line secretions. OG removed during NJ placement. NJ confirmed via xray. TF initated at 15mL/hr increase Q8 to goal. Barrientos removed, external catheter in place with consistent urine output. Multiple loose, green incontinent BMs. WOC consulted for excoriated andriy area due to incontinence.      Plan: Encourage activity and independence. Wean pressors as tolerated. Rest on full vent settings overnight. Contact primary care team with questions or concerns.        Goal Outcome Evaluation:      Plan of Care Reviewed With: patient  Overall Patient Progress: improving    Outcome Evaluation: P/S all shift. Requiring levo to maintain MAPs. TF started      Problem: Adult Inpatient Plan of Care  Goal: Plan of Care Review  Description: The Plan of Care Review/Shift note should be completed every shift.  The Outcome Evaluation is a brief statement about your assessment that the patient is improving, declining, or no change.  This information will be displayed automatically on your shift  note.  Outcome: Progressing  Flowsheets (Taken 12/8/2023 1900)  Outcome Evaluation: P/S all shift. Requiring levo to maintain MAPs. TF started  Plan of Care Reviewed With: patient  Overall Patient Progress: improving     Problem: Adult Inpatient Plan of Care  Goal: Optimal Comfort and Wellbeing  Intervention: Provide Person-Centered Care  Recent Flowsheet Documentation  Taken 12/8/2023 1600 by Carolee Wilcox, RN  Trust Relationship/Rapport:   care explained   choices provided   emotional support  provided  Taken 12/8/2023 1200 by Carolee Wilcox, RN  Trust Relationship/Rapport:   care explained   choices provided   emotional support provided  Taken 12/8/2023 0800 by Carolee Wilcox, RN  Trust Relationship/Rapport:   care explained   choices provided   emotional support provided

## 2023-12-10 NOTE — PROGRESS NOTES
Major Shift Events:  No acute events. Up to chair w/ Ax2. Norepi gtt stopped this AM, maintaining MAP > 65. 100% V paced. Tolerated PS 7/5 40% all day. ETT advanced x2. Purewick w/ several unmeasured voids. Diuresed w/ bumex & diuril. K replaced.     Plan: Continue POC, Trach Wednesday  For vital signs and complete assessments, please see documentation flowsheets.

## 2023-12-10 NOTE — PROGRESS NOTES
CV ICU PROGRESS NOTE  December 10, 2023   Priya Funez  [id number]  Admitted: 11/28/2023 10:08 AM      ASSESSMENT: Priya Funez is a 83 year old female with a PMH of HFpEF, severe TR, permanent AF s/p AVN ablation with PPM implant 1/11/19 s/p extraction TV (transvenous) PPM and implant leadless PPM 7/31/23, emergent aortic dissection repair 1/4/19, HTN, CKD, NEDA, and obesity who presented as an outpatient for elective TVR. Patient is now s/p mini TVR on 11/28/2023 with Dr. Mabry. Transferred to floor 12/2. RRT called 12/6 for worsening hypoxia and resp distress; subsequently had respiratory arrest with short CPR prior to ROSC. Intubated and transferred back to ICU.      CO-MORBIDITIES:   S/P TVR (tricuspid valve replacement)  (primary encounter diagnosis)  Complete heart block (H)  Displacement of atrial pacemaker leads, initial encounter    PLAN:  Neuro/ pain/ sedation:  - Monitor neurological status. Notify the MD for any acute changes in exam.  #Acute postoperative pain  - PRN: Tylenol, oxycodone, Fentanyl   #Sedation  - Gtt:precedex, will use at night time only.   - RASS goal 0  Awake, writing messages and interacting with staff.      Hx of general anxiety disorder  - PTA Lexapro and Remeron  - PRN Atarax      Pulmonary care:   #Mechanical ventilation  #Acute hypercapnic and hypoxic respiratory failure requiring BiPAP  #Aspiration pneumonitis vs pneumonia  #Possible aspiration event 12/1  #Acute respiratory failure 12/6/23   #Near complete plugging of left lower lobe on CT   #Acute mildly displaced and nondisplaced right 3-5 rib fractures  with associated right chest wall hematoma and nondisplaced  anterolateral left fourth and fifth rib fractures  Vent Mode: CMV/AC  (Continuous Mandatory Ventilation/ Assist Control)  FiO2 (%): 40 %  Resp Rate (Set): 18 breaths/min  Tidal Volume (Set, mL): 390 mL  PEEP (cm H2O): 5 cmH2O  Pressure Support (cm H2O): 7 cmH2O  Resp: 24  - CPT, mucomyst, duoneb ordered    - RRT called 12/6 for worsening hypoxia and resp distress; subsequently had respiratory arrest with short CPR prior to ROSC. Intubated and transferred back to ICU.   - CXR ETT high, advanced 3cm. Also noted congestion to right middle lobe, on exam rhonchi, attempt suctioning, no indication for a bronch at this time.    -repeat xray with ETT in appropriate positioning, right congestion improved   - Bronch 12/7  - PS during the day time with plan of placing back on full support overnight.   - Did discuss case with ENT, will allow for more time and consider trach start of next week 12/11 and once INR has normalized      Cardiovascular:    #Hx of severe TR s/p TVR  #HFpEF  #Hx of Afib s/p AVN ablation - PPM, upgraded to leadless 7/2023  #Hypertension  #Hyperlipidemia  #Hx of aortic dissection repair 2019  # Respiratory arrest 12/6   # Elevated BNP   Stable paced rhythm with underlying atrial flutter. On review of cardiology, underlying aflutter/afib has been present since at least June of this year.   Pre-op 9/8/23 echo: LVEF 55/60%, mildly dilated RV, normal function  Echo 12/4: LVEF 60-65%, normal RV function, TV mean gradient 5 mmHg, no TR  - Echo 12/7 with no significant change from previous with the exception of mild reduction of RV. RV free wall dyskinesia   - ASA 81 mg daily  - Holding Metoprolol 12.5 mg BID (PTA 25 mg daily)-- currently requiring vasopressors   - Device RN adjusted PPM to PTA settings rate 60   - Hold PTA spironolactone 25 mg daily  - Hold PTA jardiance  - Warfarin held for potential procedure. Previously supra therapeutic. Per CVTS no heparin bridge needed at this time. Anticoagulated for underlying afib/flutter    -INR 1.45   - Pressors: Low dose norepi wean as able maintaining MAP > 65  - currently on stress dose steroids, started taper 12/9, will hold at 50mg [id number] today    - Ongoing diureses with Bumex, goal to be net even for the next 24 hours, one time dose of chlorothiazide   - BNP  significantly elevated, repeat down trending at >22K      GI care / Nutrition:   #Multiple loose Bms  #Hx of C. Difficile 9/2023  - NJT and enteral feeds   - prior to reintubation SLP recommended regular diet with thin liquids. Patient with history of dysphagia, possible aspiration event on 12/1  - oral vancomycin for prophylaxis.   - PPI- with change Protonix to famotidine   - Bowel regimen on hold secondary to loose stools.  - increase Banatrol      Renal / Fluids / Electrolytes:   #CKD stage 3  #Lactic acidosis, resolved  #Hypokalemia  #IMAN on CKD   #Hypernatremia   BL creat ~1.2-1.3, most recent creatinine 1.26  FLUID STATUS: Pre-op weight 70.9 kg, current weight 70.1kg  - HOLD PTA spironolactone 25 mg daily  - HOLD PTA torsemide   - elevated sodium, likely 2/2 aggressive diureses. One time dose of chlorothiazide and increased free water.   - Bolus dosing bumex and prn chlorotiazide. Goal to be euvolemic to net negative 500 today. Outputs are not accurately reflected secondary to unmeasured outputs.   - Replete lytes per protocol  - Avoid/limit nephrotoxins as able  - Strict I/O, daily weights, avoid/limit nephrotoxins     Endocrine:    #Stress induced hyperglycemia  #T2DM  Hgb A1c 5.7%  - sliding scale insulin q4 hours  - PTA Jardiance on hold, resume once medically optimized   - Goal BG <180 for optimal healing     ID / Antibiotics:  #Stress induced leukocytosis  #Probable aspiration pneumonitis vs pneumonia  WBC 16.5 remains afebrile, mild elevation from 11.9   - Mild elevation to lactic acid 2.4, repeat at 1200 and trend as needed until resolution   - 11/30 Zosyn started for Pneumonia  - 12/6 Given further decompensation, Vanco and meropenem started    - remains on meropenum   - Vancomycin stopped 12/7  - Follow up BC x 2, UA and BAL   - Blood culture with NGTD   - urine with Candida and sputum with Yeast, likely both colonizations.   - urine culture with NGTD  - Will not start an antifungal at this time    -repeat cultures today       #Hx of recent C. Difficile  - C. Difficile toxin PCR positive 12/3 with negative antigen and negative toxin - per antimicrobial stewardship team, more likely to be colonization with recent history and no apparent treatment   - oral vancomycin 2/2 now hypotensive and in the ICU. Persistent leukocytosis.    - Will discontinue once ABX complete   - Monitor fever curve, WBC, and inflammatory markers as appropriate     Heme:     #Acute blood loss anemia  No s/sx active bleeding  - CBC   - Hgb goal > 7.0     MSK / Skin:  #Mini-thoracotomy  #Rib fracture  #Surgical incision  - Mini-thoracotomy precautions  - Incisional cares per protocol  - postop incision management protocol  - PT/OT/CR    Prophylaxis:    - VTE: SCD's, heparin drip   - Bowel regimen: Famotidine, MiraLAX, senna-- currently on hold     Lines/ tubes/ drains:  - ETT  - PICC line   - Arterial Line  - Rectal Tube   - NJT    Disposition:  - CVICU    ICU Checklist  F - Feeding: Start enteral feeds today   A - Analgesia: prns   S - Sedation: Precedex  T - Thromboembolic prophylaxis: warfarin      H - Head of bed elevated: yes   U - Ulcer prophylaxis: famotidine   G - Glycemic control: sliding scale insulin   S - SBT-tolerating.      B - Bowel regimen- currently on hold   I - Indwelling catheter- remove today   D - De-escalation of antibiotics    Patient seen, findings and plan discussed with CVICU staff and cardiothoracic surgeons.    Shari Millan NP  ICU    ====================================    TODAY'S PROGRESS  SUBJECTIVE  Examined while resting in bed. Remains intubated, and lightly sedated. Able to follow commands in all extremities and writes questions     OBJECTIVE  1. VITAL SIGNS  Temp:  [97.7  F (36.5  C)-98.1  F (36.7  C)] 98  F (36.7  C)  Pulse:  [60-86] 81  Resp:  [16-30] 24  MAP:  [47 mmHg-116 mmHg] 65 mmHg  Arterial Line BP: ()/() 125/42  FiO2 (%):  [40 %] 40 %  SpO2:  [96 %-100 %] 99 %  Vent Mode:  CMV/AC  (Continuous Mandatory Ventilation/ Assist Control)  FiO2 (%): 40 %  Resp Rate (Set): 18 breaths/min  Tidal Volume (Set, mL): 390 mL  PEEP (cm H2O): 5 cmH2O  Pressure Support (cm H2O): 7 cmH2O  Resp: 24      2. INTAKE/ OUTPUT  I/O last 3 completed shifts:  In: 2148.07 [I.V.:493.07; NG/GT:840]  Out: 1850 [Urine:1450; Stool:400]    3. PHYSICAL EXAMINATION    General: orally intubated, follows commands on all extremities alert and oriented   Neuro: VERDIN with equal strength.   Resp: rhonchi right middle/upper lobe; mechanically ventilated   CV: paced  Abdomen: Soft, non-distended, non-tender  Incisions: thoracotomy incision is cdi  Extremities: warm and well perfused. No peripheral edema noted         4. INVESTIGATIONS  Arterial Blood Gases   Recent Labs   Lab 12/08/23  0234 12/07/23  0515 12/06/23  2214 12/06/23 2007   PH 7.45 7.38 7.40 7.38   PCO2 28* 37 35 35   PO2 145* 60* 120* 293*   HCO3 19* 22 22 21     Complete Blood Count   Recent Labs   Lab 12/10/23  0359 12/09/23  0338 12/08/23  0414 12/07/23  0128   WBC 16.5* 11.9* 16.7* 14.2*   HGB 9.6* 9.4* 9.4* 9.3*    236 302 183     Basic Metabolic Panel  Recent Labs   Lab 12/10/23  0359 12/10/23  0357 12/10/23  0005 12/09/23  2146 12/09/23  2021 12/09/23  1551 12/09/23  1137 12/09/23  0341 12/09/23  0338 12/08/23  2007 12/08/23 2002   *  --   --  152*  --   --  149*  --  150*  150*  --  146*   POTASSIUM 2.8*  --   --   --   --   --  3.5  --  2.9*  2.9*  2.9*  --  3.3*   CHLORIDE 108*  --   --   --   --   --  111*  --  110*  110*  --  110*   CO2 28  --   --   --   --   --  27  --  25  25  --  25   BUN 55.5*  --   --   --   --   --  53.8*  --  49.4*  49.4*  --  49.8*   CR 1.26*  --   --   --   --   --  1.35*  --  1.38*  1.38*  --  1.50*   * 173* 175*  --  158*   < > 181*   < > 140*  140*   < > 145*    < > = values in this interval not displayed.     Liver Function Tests  Recent Labs   Lab 12/10/23  0359 12/09/23  0338 12/08/23  0414  "12/07/23  0409 12/07/23  0128   AST 43 25 20 32 32   ALT 32 20 19 23 23   ALKPHOS 78 65 65 66 68   BILITOTAL 0.3 0.3 0.3 0.4 0.4   ALBUMIN 3.2* 3.2* 3.2* 3.0* 2.9*   INR 1.45* 2.51* 2.99*  --  3.93*     Pancreatic Enzymes  No lab results found in last 7 days.  Coagulation Profile  Recent Labs   Lab 12/10/23  0359 12/09/23  0338 12/08/23  0414 12/07/23  0128 12/06/23  2210 12/06/23  1905   INR 1.45* 2.51* 2.99* 3.93* 3.90* 3.52*   PTT  --   --   --   --  36 83*     Lactate  Invalid input(s): \"LACTATE\"    5. RADIOLOGY  Recent Results (from the past 24 hour(s))   XR Abdomen Port 1 View    Narrative    EXAMINATION:  XR ABDOMEN PORT 1 VIEW 12/6/2023     COMPARISON: CT 2/3/2023, chest x-ray 12/5/2023.    HISTORY: monitor for dilated bowel with ongoing CDiff.    TECHNIQUE: Two frontal supine views of the abdomen.    FINDINGS: Postsurgical changes of the chest and support devices  similar to prior chest radiograph from 12/5/2023 No abnormally dilated  air-filled loops of bowel. Persistent elevation of the right  hemidiaphragm. No pneumatosis or portal venous gas.      Impression    IMPRESSION:     1. No air-filled dilated bowel loop  2. Persistent elevation of the right hemidiaphragm    I have personally reviewed the examination and initial interpretation  and I agree with the findings.    ELIZABETH VILLALBA MD         SYSTEM ID:  [id number]   XR Chest Port 1 View    Narrative    EXAM: Chest radiograph 12/6/2023 4:06 PM    HISTORY: 83 years Female increased SOB.     COMPARISON: Chest x-ray 12/5/2023.    TECHNIQUE: Portable AP view of the chest.    FINDINGS:   Postsurgical changes of the chest. Median sternotomy wires are intact.  Mediastinal surgical clips. Leadless cardiac pacer device overlying  the lower left chest. Tricuspid valve prosthesis. Trachea is  approximately midline. Stable elevation of the right hemidiaphragm.  Mildly increased diffuse interstitial opacities with bilateral  costophrenic angle blunting. No " pneumothorax. The visualized upper  abdomen is unremarkable. No acute osseous abnormality.      Impression    IMPRESSION:   1.  Minimally increased diffuse interstitial opacities, likely  represents postsurgical edema/atelectasis. However, underlying  superimposed infectious etiology cannot be entirely ruled out.  Attention on follow-up.  2.  Postsurgical changes of the chest are stable. Support devices as  above.  3.  Small-to moderate bilateral effusions. Decreased on right.    I have personally reviewed the examination and initial interpretation  and I agree with the findings.    NASEEM RUEDA MD         SYSTEM ID:  [id number]   CT Head w/o Contrast    Impression    RESIDENT PRELIMINARY INTERPRETATION  IMPRESSION: No acute intracranial pathology.      CT Chest Abdomen Pelvis w/o Contrast    Narrative    EXAMINATION: CT CHEST ABDOMEN PELVIS W/O CONTRAST, 12/6/2023 8:56 PM    INDICATION: Shock, sepsis, code blue    COMPARISON STUDY: CT of 2/3/2023    TECHNIQUE: CT scan of the chest, abdomen and pelvis was performed on  multidetector CT scanner using volumetric acquisition technique and  images were reconstructed in multiple planes with variable thickness  and reviewed on dedicated workstations.     CONTRAST: Without.   Without oral contrast.    CT scan radiation dose is optimized to minimum requisite dose using  automated dose modulation techniques.    FINDINGS:    Endotracheal tube terminates at the keegan.    Chest:   Limited noncontrast examination shows postoperative changes from type  A aortic dissection repair with its thin hyperdense dissection flap  extending from the distal aspect of the repair to the abdominal aortic  bifurcation as well as into the left common carotid artery, grossly  unchanged from comparison to prior contrast enhanced examination.    The heart is enlarged. Tricuspid valve prosthesis and leadless pacer  in the right atrium. Trace pericardial effusion. The esophagus is  unremarkable.  No thoracic lymphadenopathy.    Trace bilateral pleural effusions. No pneumothorax. Near complete  collapse of the left lower lobe. Subsegmental dependent atelectasis in  the right lower lobe. Additional scattered groundglass and  consolidative opacities throughout the left upper lobe. Extensive  endobronchial mucous plugging in the left lower lobe. Mild bilateral  interlobular septal thickening.    Abdomen and Pelvis:  Liver: Stable too small to characterize hepatic hypodensities.  Otherwise unremarkable liver.    Biliary System: Cholelithiasis without evidence of acute  cholecystitis. No extrahepatic biliary dilatation.    Pancreas: No mass or pancreatic ductal dilation.    Adrenal glands: No mass or nodules    Spleen: Normal.    Kidneys: Small nonobstructive renal calculi bilaterally, unchanged. No  hydronephrosis.    Gastrointestinal tract: Normal caliber small and large bowel. The  appendix is within normal limits. Colonic diverticulosis without  evidence of acute diverticulitis.    Mesentery/peritoneum/retroperitoneum: Mesenteric edema without free  fluid or free air.    Lymph nodes: No significant lymphadenopathy.    Pelvis: Urinary bladder is normal. Uterus and adnexa within normal  limits.    Osseous structures: Diffuse osteopenia with L2 level degenerative  changes of the spine and T12, L1 compression deformities, unchanged.  Acute mildly displaced and nondisplaced anterolateral right third,  fourth, and fifth and left fourth and fifth rib fractures.    Soft tissues: Diffuse subcutaneous anasarca.  Presumed chest wall  hematoma overlying the fractured right ribs, measuring 1.5 cm in  thickness.  3.5 cm collection in the right groin, likely a post  vascular access hematoma.  Hyperattenuating enlargement of the left  pectineus muscle.        Impression    IMPRESSION:     1. Limited noncontrast examination with grossly stable appearance of  type A aortic dissection repair with dissection flap extending  from  the distal aspect of the repair through the abdominal aortic  bifurcation.  2. Acute mildly displaced and nondisplaced right 3-5 rib fractures  with associated right chest wall hematoma and nondisplaced  anterolateral left fourth and fifth rib fractures, presumably related  to cardiopulmonary resuscitative efforts.   3. Near complete atelectasis of the left lower lobe associated with  extensive and bronchial mucous plugging; consider aspiration.  Subsegmental atelectasis of the right lower lobe. Additional scattered  groundglass and consolidative opacities throughout the left upper lobe  may represent edema and/or infection.  4. Trace bilateral pleural effusions.  5. 3.5 cm right groin collection, presumably a post vascular access  hematoma.  5. Left pectineus intramuscular hematoma.  6. No acute intra-abdominal pathology suspected.  7. Endotracheal tube terminates at the keegan. Recommend retraction.    I have personally reviewed the examination and initial interpretation  and I agree with the findings.    ADRIANNA GAYTAN DO         SYSTEM ID:  [id number]   XR Chest Port 1 View    Impression    RESIDENT PRELIMINARY INTERPRETATION  Impression:   1. New endotracheal tube with tip in the mid thoracic trachea.  2. Increased left lung opacity is represent atelectasis, and edema.  3. Improved aeration of the right lung.  4. Small left greater than right pleural effusions.   XR Abdomen Port 1 View    Impression    RESIDENT PRELIMINARY INTERPRETATION  IMPRESSION: Enteric tube is in the stomach..    XR Chest Port 1 View    Narrative    Portable chest 12/7/2023 at 0106 hours    Indication: Daily monitoring. Short of breath.    COMPARISON: Yesterday 2259 hours    FINDINGS: Previous pacemaker. Median sternotomy. Endotracheal  intubation with tube tip approximately 4.2 cm above the keegan. No  ectopic air. Patchy densities especially in the left lung with left  costophrenic angle blunting appears similar in the short  interval.  Retrocardiac density prominence and silhouetting the left  hemidiaphragm unchanged in the short interval.      Impression    IMPRESSION: Continued left pleural effusion with retrocardiac  atelectasis and perihilar edema.    NASEEM RUEDA MD         SYSTEM ID:  [id number]

## 2023-12-10 NOTE — PROGRESS NOTES
ENT Progress Note    S: Patient has continued to be ventilator dependent. She is able to nod and write questions. Her family and her are still discussing consent for tracheostomy.      O:   Patient vitally stable, currently satting well on pressure support, 40% FiO2.  HEENT: Neck with previous tracheostomy scar, palpable landmarks    A/P:   Ms. Funez is an 83-year-old whom we are consulted on for tracheostomy for pulmonary toilet.  The primary team feels that her respiratory status is tenuous, and that she will benefit from tracheostomy for pulmonary toilet given her recent rib fractures and poor ability to clear her secretions when she was recently extubated.  We are in the process of obtaining consent from her family, they are asking excellent questions about prognosis and weighing risks and benefits.  Additionally, we are working on staffing and ruining the case.  It is added on in the meantime.    - Will plan for trach on Wednesday, No longer planned for tomorrow, OK for heparin and tube feeds      Patient seen and assessed with Dr. Mj Starr MD  Otolaryngology - Head and Neck Surgery PGY-3

## 2023-12-11 NOTE — PROGRESS NOTES
End of shift summary:    Neuro: Aox4. Writing communications very well. Opens eyes spontaneously. 0.2 mcg/kg/hr Precedex overnight for chest tightness and ETT discomfort which gave good relief.    CV: Rate/rhythm: 100% V paced, 60-70s. A flutter intrinsic rhythm. Levo stopped this AM    Pulm: CMV settings overnight. See 0400 ABG- metabolic alkalosis    GI: NPO, NJ bridled at 89. TF @ goal 45 mL/hr. Rectal tube    : External catheter in place. Most voids measured, some leakage.    Skin: Old R groin incision, scattered generalized bruising, Old R CT site, Brittany area reddened, sacrum/coccyx mepilex.     Drains: R radial  art line(positional), R PICC, L PIV    Drips:   Dex: 0.2  Levo: .03    Labs: K/Mag protocols in place  K: 3.0, Replacing with 60 meq this am    Plan: Trach planned for Wednesday. Continue CVICU cares and monitoring.

## 2023-12-11 NOTE — SIGNIFICANT EVENT
SPIRITUAL HEALTH SERVICES Significant Event  [employer]  [employer] [facility] 4a    Pt anointed by Father Madison Harrison   Pager [phone]

## 2023-12-11 NOTE — PROGRESS NOTES
Care Management Follow Up    Length of Stay (days): 13    Expected Discharge Date: Unknown      Concerns to be Addressed: Discharge planning    Patient plan of care discussed at interdisciplinary rounds: Yes    Anticipated Discharge Disposition:  (To be determined.)     Anticipated Discharge Services: n/a    Anticipated Discharge DME: n/a    Patient/family educated on Medicare website which has current facility and service quality ratings: no     Education Provided on the Discharge Plan: Yes    Patient/Family in Agreement with the Plan: Yes     Referrals Placed by CM/SW: n/a    Private pay costs discussed:  Not at this time    Additional Information: SW was asked by bedside RN to meet with family to discuss LTAC vs TCU. PRATEEK met with pt and family at bedside. PRATEEK spoke primarily with dtr, Susie. Susie reported they would prefer [facility] over [facility] for LTAC. Susie asked if pt needs rehab after being done at LTAC are there TCUs that accept people with trachs. PRATEEK said yes, but the options are limited. Susie asked if she could have a list so she could start doing some research. PRATEEK provided Susie with the list of TCUs that accept people with new trachs.    Maggy Jaramillo PRATEEK  [facility]   Covering for Radha Jenna - Phone: [phone]

## 2023-12-11 NOTE — PROGRESS NOTES
CV ICU PROGRESS NOTE  12/11/2023          ASSESSMENT: Priya Funez is a 83 year old female with a PMH of HFpEF, severe TR, permanent AF s/p AVN ablation with PPM implant 1/11/19 s/p extraction TV (transvenous) PPM and implant leadless PPM 7/31/23, emergent aortic dissection repair 1/4/19, HTN, CKD, NEDA, and obesity who presented as an outpatient for elective TVR. Patient is now s/p mini TVR on 11/28/2023 with Dr. Mabry. Transferred to floor 12/2. RRT called 12/6 for worsening hypoxia and resp distress; subsequently had respiratory arrest with short CPR prior to ROSC. Intubated and transferred back to ICU.      CO-MORBIDITIES:   S/P TVR (tricuspid valve replacement)  (primary encounter diagnosis)  Complete heart block (H)  Displacement of atrial pacemaker leads, initial encounter    PLAN:  Neuro/ pain/ sedation:  - Monitor neurological status. Notify the MD for any acute changes in exam.  #Acute postoperative pain  - PRN: Tylenol, oxycodone, Fentanyl   #Sedation  - Gtt: precedex, will use at night time only.   - RASS goal 0  Awake, writing messages and interacting with staff.   #Hx of general anxiety disorder  - PTA Lexapro and Remeron     Pulmonary care:   #Mechanical ventilation  #Acute hypercapnic and hypoxic respiratory failure requiring BiPAP  #Aspiration pneumonitis vs pneumonia  #Possible aspiration event 12/1  #Acute respiratory failure 12/6/23   #Near complete plugging of left lower lobe on CT   #Acute mildly displaced and nondisplaced right 3-5 rib fractures  with associated right chest wall hematoma and nondisplaced  anterolateral left fourth and fifth rib fractures  Vent Mode: CMV/AC  (Continuous Mandatory Ventilation/ Assist Control)  FiO2 (%): 40 %  Resp Rate (Set): 18 breaths/min  Tidal Volume (Set, mL): 390 mL  PEEP (cm H2O): 5 cmH2O  Pressure Support (cm H2O): 7 cmH2O  Resp: 18  - CPT, mucomyst, duoneb ordered   - RRT called 12/6 for worsening hypoxia and resp distress; subsequently had  respiratory arrest with short CPR prior to ROSC. Intubated and transferred back to ICU.   - PS during the day time with plan of continuing PS overnight if tolerated, otherwise can return back on full support overnight if needed.   - CXR 12/11 w/ right subsegmental atelectasis, mildly improved   - Bronch 12/7 (+) Candida albicans  - ENT to consent for trach, tentatively planned for 12/13     Cardiovascular:    # Hx of severe TR s/p TVR  # HFpEF  # Hx of Afib s/p AVN ablation - PPM, upgraded to leadless 7/2023  # Hypertension  # Hyperlipidemia  # Hx of aortic dissection repair 2019  # Respiratory arrest 12/6   # Elevated BNP   Stable paced rhythm with underlying atrial flutter. On review of cardiology, underlying aflutter/afib has been present since at least June of this year.   Pre-op 9/8/23 echo: LVEF 55/60%, mildly dilated RV, normal function  Echo 12/4: LVEF 60-65%, normal RV function, TV mean gradient 5 mmHg, no TR  - Echo 12/7 with no significant change from previous with the exception of mild reduction of RV. RV free wall dyskinesia   - ASA 81 mg daily  - Holding Metoprolol 12.5 mg BID (PTA 25 mg daily)-- currently requiring vasopressors   - Device RN adjusted PPM to PTA settings rate 60   - Hold PTA spironolactone 25 mg daily  - Hold PTA jardiance  - Warfarin held for potential procedure. Previously supra therapeutic. Per CVTS no heparin bridge needed at this time. Anticoagulated for underlying afib/flutter. INR 1.2 today   - Pressors: Low dose norepi wean as able maintaining MAP > 65  - Currently on stress dose steroids, started taper 12/9, currently at 50mg [id number]   - S/p diureses with Bumex and chlorothiazide  - Hold off further diuresis today   - BNP significantly elevated, repeat down trending at >22K      GI care / Nutrition:   #Multiple loose Bms  #Hx of C. Difficile 9/2023  - NJT and enteral feeds   - Prior to reintubation SLP recommended regular diet with thin liquids. Patient with history of  dysphagia, possible aspiration event on 12/1.   - Oral vancomycin for prophylaxis  - PPI famotidine   - Bowel regimen on hold secondary to loose stools.  - Continue Banatrol      Renal / Fluids / Electrolytes:   #Hypernatremia   #Hypokalemia  #CKD stage 3  #Lactic acidosis, resolved  #IMAN on CKD, resolved   BL creat ~1.2-1.3, most recent creatinine 1.04  FLUID STATUS: Pre-op weight 70.9 kg, current weight 72.4kg   - HOLD PTA spironolactone 25 mg daily  - HOLD PTA torsemide   - Elevated sodium, likely 2/2 aggressive diureses. Continue free water flushes @ 60 mL/Hr   - S/p bolus dosing bumex and prn chlorotiazide. Goal to be euvolemic to net negative 500 today. Outputs are not accurately reflected secondary to unmeasured outputs. Holding off on further diuresis.   - Replete lytes per protocol  - Avoid/limit nephrotoxins as able  - Strict I/O, daily weights, avoid/limit nephrotoxins     Endocrine:    #Stress induced hyperglycemia  #T2DM  Hgb A1c 5.7%  - Sliding scale insulin q4 hours  - PTA Jardiance on hold, resume once medically optimized   - Goal BG <180 for optimal healing     ID / Antibiotics:  #Stress induced leukocytosis  #Probable aspiration pneumonitis vs pneumonia  WBC 16.5 remains afebrile, mild elevation from 11.9   - Mild elevation to lactic acid 2.4, trend as needed until resolution   - 11/30 Zosyn started for Pneumonia  - 12/6 Given further decompensation, Vanco and meropenem started   - Finish meropenem 7 day course (12/12)   - Finish Vancomycin PO 7 day course for c-diff (12/13)  - Follow up BC x 2, UA and BAL  - Blood culture with NGTD   - Urine with Candida and sputum with Yeast, likely both colonizations.   - Urine culture with NGTD  -Repeat cultures 12/10      #Hx of recent C. Difficile  - C. Difficile toxin PCR positive 12/3 with negative antigen and negative toxin - per antimicrobial stewardship team, more likely to be colonization with recent history and no apparent treatment  - Continue oral  vancomycin course to finish 12/13  - Monitor fever curve, WBC, and inflammatory markers as appropriate     Heme:     #Acute blood loss anemia  No s/sx active bleeding  - CBC   - Hgb goal > 7.0     MSK / Skin:  #Mini-thoracotomy  #Rib fracture  #Surgical incision  - Mini-thoracotomy precautions  - Incisional cares per protocol  - postop incision management protocol  - PT/OT/CR    Prophylaxis:    - VTE: SCD's, SQH  - Bowel regimen: Famotidine, bowel medications discontinued in setting of loose stools     Lines/ tubes/ drains:  - ETT  - PICC line   - Arterial Line  - Rectal Tube   - NJT    Disposition:  - CVICU    ICU Checklist  F - Feeding: NJ   A - Analgesia: prns   S - Sedation: Precedex  T - Thromboembolic prophylaxis: SQH      H - Head of bed elevated: yes   U - Ulcer prophylaxis: famotidine   G - Glycemic control: sliding scale insulin   S - SBT-tolerating.      B - Bowel regimen- currently on hold   I - Indwelling catheter- none  D - De-escalation of antibiotics    Patient seen, findings and plan discussed with CVICU staff and cardiothoracic surgeons.    Quin Langford DO   Anesthesiology Resident   PGY2/CA1    ICU *[id number]    ====================================    TODAY'S PROGRESS  SUBJECTIVE  Reports doing well without complaints of pain. No N/V. Communicates by writing.     OBJECTIVE  1. VITAL SIGNS  Temp:  [97.8  F (36.6  C)-99.4  F (37.4  C)] 97.8  F (36.6  C)  Pulse:  [] 60  Resp:  [13-35] 18  MAP:  [55 mmHg-248 mmHg] 63 mmHg  Arterial Line BP: ()/() 111/43  FiO2 (%):  [40 %] 40 %  SpO2:  [91 %-100 %] 100 %  Vent Mode: CMV/AC  (Continuous Mandatory Ventilation/ Assist Control)  FiO2 (%): 40 %  Resp Rate (Set): 18 breaths/min  Tidal Volume (Set, mL): 390 mL  PEEP (cm H2O): 5 cmH2O  Pressure Support (cm H2O): 7 cmH2O  Resp: 18      2. INTAKE/ OUTPUT  I/O last 3 completed shifts:  In: 3813.27 [I.V.:883.27; NG/GT:1850]  Out: 2250 [Urine:2050; Stool:200]    3. PHYSICAL EXAMINATION  General:  orally intubated, follows commands on all extremities alert and oriented, writes on paper to communicate  Neuro: VERDIN with equal strength.   Resp: rhonchi right middle/upper lobe; mechanically ventilated   CV: paced around 60   Abdomen: Soft, non-distended, non-tender  Incisions: thoracotomy incision is cdi  Extremities: warm and well perfused. Trace to no peripheral edema noted.     4. INVESTIGATIONS  Arterial Blood Gases   Recent Labs   Lab 12/11/23  0421 12/08/23  0234 12/07/23  0515 12/06/23  2214   PH 7.57* 7.45 7.38 7.40   PCO2 38 28* 37 35   PO2 120* 145* 60* 120*   HCO3 35* 19* 22 22     Complete Blood Count   Recent Labs   Lab 12/11/23  0419 12/10/23  0359 12/09/23  0338 12/08/23  0414   WBC 10.2 16.5* 11.9* 16.7*   HGB 8.8* 9.6* 9.4* 9.4*    285 236 302     Basic Metabolic Panel  Recent Labs   Lab 12/11/23  0430 12/11/23  0419 12/11/23  0033 12/10/23  1956 12/10/23  1553 12/10/23  1221 12/10/23  0757 12/10/23  0359 12/10/23  0005 12/09/23  2146 12/09/23  1551 12/09/23  1137   NA  --  150*  --   --   --  151*  --  150*  --  152*  --  149*   POTASSIUM  --  3.0*  --   --   --  4.1  --  2.8*  --   --   --  3.5   CHLORIDE  --  106  --   --   --  109*  --  108*  --   --   --  111*   CO2  --  32*  --   --   --  28  --  28  --   --   --  27   BUN  --  54.3*  --   --   --  57.8*  --  55.5*  --   --   --  53.8*   CR  --  1.04*  --   --   --  1.18*  --  1.26*  --   --   --  1.35*   * 168* 133* 149*   < > 136*  157*   < > 184*   < >  --    < > 181*    < > = values in this interval not displayed.     Liver Function Tests  Recent Labs   Lab 12/11/23 0419 12/10/23  0359 12/09/23  0338 12/08/23  0414 12/07/23 0409   AST  --  43 25 20 32   ALT  --  32 20 19 23   ALKPHOS  --  78 65 65 66   BILITOTAL  --  0.3 0.3 0.3 0.4   ALBUMIN  --  3.2* 3.2* 3.2* 3.0*   INR 1.20* 1.45* 2.51* 2.99*  --      Pancreatic Enzymes  No lab results found in last 7 days.  Coagulation Profile  Recent Labs   Lab 12/11/23  0419  "12/10/23  0359 12/09/23  0338 12/08/23  0414 12/07/23  0128 12/06/23  2210 12/06/23  1905   INR 1.20* 1.45* 2.51* 2.99*   < > 3.90* 3.52*   PTT  --   --   --   --   --  36 83*    < > = values in this interval not displayed.     Lactate  Invalid input(s): \"LACTATE\"    5. RADIOLOGY  Recent Results (from the past 24 hour(s))   XR Chest Port 1 View    Narrative    Exam: XR CHEST PORT 1 VIEW, 12/10/2023 9:36 AM    Indication: eval ETT placement    Comparison: 12/10/2023    Findings:   The endotracheal tube tip at the mid to low thoracic trachea 3.1 cm  above the keegan. Right upper extremity PICC tip at the high right  atrium. Leadless pacemaker in stable position. Tricuspid valve  prosthesis. Enteric tube courses into the stomach and below the field  of view. Median sternotomy wires and mediastinal surgical clips.  Stable cardiomegaly mediastinal silhouette. The pulmonary vasculature  is indistinct with bilateral interstitial prominence. No appreciable  pleural effusion or pneumothorax. Slightly decreased streaky perihilar  and right greater than left basilar airspace opacities. Continued  asymmetric elevation of the right hemidiaphragm.      Impression    Impression:   1. Endotracheal tube advanced with tip at the mid to lower thoracic  trachea, 3.1 cm above the keegan.  2. Slightly decreased streaky perihilar and right greater than left  basilar airspace opacities.    ADRIANNA GAYTAN,          SYSTEM ID:  [id number]   XR Chest Port 1 View    Narrative    EXAM: XR CHEST PORT 1 VIEW 12/10/2023 6:48 PM    DEMOGRAPHICS: Female of 83 years    INDICATION: Tube check    COMPARISON: 0925 hours    TECHNIQUE: Single portable AP view of the chest.    FINDINGS:   Endotracheal tube at the mid thoracic trachea. Partially visualized  enteric tube. Intact sternotomy wires. Tricuspid valve replacement  stable in position. Right upper extremity PICC tip terminates at the  right atrium. Leadless pacemaker is stable in position. " Right  hemidiaphragm remains elevated. Linear attenuation in the right lung.  Trachea is midline. Aortic knob is enlarged. Upper abdomen is  unremarkable. No pneumothorax.       Impression    IMPRESSION:   1.  Persistently elevated right hemidiaphragm with right right  subsegmental atelectasis.  2.  Support devices appear stable.    I have personally reviewed the examination and initial interpretation  and I agree with the findings.    GLADYS DENNIS MD         SYSTEM ID:  [id number]

## 2023-12-11 NOTE — PROGRESS NOTES
Patient tolerated PS 7/5 for 13 hours. Tolerated very well. Switched back to full support for the night.

## 2023-12-12 NOTE — PROGRESS NOTES
CV ICU PROGRESS NOTE  12/12/2023          ASSESSMENT: Priya Funez is a 83 year old female with a PMH of HFpEF, severe TR, permanent AF s/p AVN ablation with PPM implant 1/11/19 s/p extraction TV (transvenous) PPM and implant leadless PPM 7/31/23, emergent aortic dissection repair 1/4/19, HTN, CKD, NEDA, and obesity who presented as an outpatient for elective TVR. Patient is now s/p mini TVR on 11/28/2023 with Dr. Mabry. Transferred to floor 12/2. RRT called 12/6 for worsening hypoxia and resp distress; subsequently had respiratory arrest with short CPR prior to ROSC. Intubated and transferred back to ICU.      CO-MORBIDITIES:   S/P TVR (tricuspid valve replacement)  (primary encounter diagnosis)  Complete heart block (H)  Displacement of atrial pacemaker leads, initial encounter    Today's Progress:  - New nausea treated with zofran and compazine PRN  - Finishing meropenem today, and vancomycin tomorrow   - Plan for tracheostomy tomorrow with ENT, will hold TF at midnight   - Hold off further diuresis for now, pending accurate volume assessment     PLAN:  Neuro/ pain/ sedation:  - Monitor neurological status. Notify the MD for any acute changes in exam.  #Acute postoperative pain  - PRN: Tylenol, oxycodone, robaxin, lido patches and cream    #Sedation  - Gtt: discontinue Precedex   - Scheduled Melatonin HS   #Hx of general anxiety disorder  - PTA Lexapro and Remeron     Pulmonary care:   #Mechanical ventilation  #Acute respiratory failure and arrest on 12/6/23   #Acute hypercapnic and hypoxic respiratory failure requiring BiPAP  #Aspiration pneumonitis vs pneumonia  #Possible aspiration event 12/1  #Near complete plugging of left lower lobe on CT   #Acute mildly displaced and nondisplaced right 3-5 rib fractures with associated right chest wall hematoma and nondisplaced anterolateral left fourth and fifth rib fractures  Vent Mode: CPAP/PS  (Continuous positive airway pressure with Pressure Support)  FiO2  (%): 40 %  Resp Rate (Set): 18 breaths/min  Tidal Volume (Set, mL): 390 mL  PEEP (cm H2O): 5 cmH2O  Pressure Support (cm H2O): 7 cmH2O  Resp: 23  - Intubated, CXR to check ETT position, was @ 23 cm now @ 20-21 cm   - RRT called 12/6 for worsening hypoxia and resp distress; subsequently had respiratory arrest with short CPR prior to ROSC. Intubated and transferred back to ICU.   - PS during the day and most of the night, backup AC ventilation overnight if needed.   - CPT, mucomyst, duoneb ordered   - ABG daily   - CXR 12/12 shows improvement   - Bronch 12/7 (+) Candida albicans  - Plan for OR tomorrow 12/13 with ENT for trach   - Will plan to get CXR after patient returns from tracheostomy tomorrow     Cardiovascular:    # s/p respiratory arrest 12/6  # Hx of severe TR s/p TVR  # HFpEF  # Hx of Afib s/p AVN ablation - PPM, upgraded to leadless 7/2023  # Hypertension  # Hyperlipidemia  # Hx of aortic dissection repair 2019  # Respiratory arrest 12/6   # Elevated BNP   Stable paced rhythm with underlying atrial flutter. On review of cardiology, underlying aflutter/afib has been present since at least June of this year.   Pre-op 9/8/23 echo: LVEF 55/60%, mildly dilated RV, normal function  Echo 12/4: LVEF 60-65%, normal RV function, TV mean gradient 5 mmHg, no TR  - Echo 12/7 with no significant change from previous with the exception of mild reduction of RV. RV free wall dyskinesia   - Device RN adjusted PPM to PTA settings rate 60   - ASA 81 mg daily  - Hold Metoprolol 12.5 mg BID (PTA 25 mg daily)-- required levo overnight    - Hold PTA spironolactone 25 mg daily  - Hold PTA jardiance  - Warfarin held for potential procedure. Previously supra therapeutic. Per CVTS no heparin bridge needed at this time. Anticoagulated for underlying afib/flutter. INR 1.15 today. On SQH only.   - Pressors: Intermittently required low dose norepi wean as able maintaining MAP > 65  - Discontinued stress dose steroids  - BNP was  significantly elevated, repeat down trending at >22K       GI care / Nutrition:   # Nausea   # Multiple loose stools   # Hx of C. Difficile (9/2023)  - NJT and enteral feeds at goal (45 mL/hr)   - Prior to reintubation SLP recommended regular diet with thin liquids. Patient with history of dysphagia, possible aspiration event on 12/1.   - PPI famotidine   - Bowel regimen on hold secondary to loose stools  - Will finish vancomycin po 7 day course (as described below).   - Increased Banatrol   - PRN Zofran  4 mg PO or IV Q6H  - PRN Compazine 5 mg IV Q6H   - Hold TF at midnight for surgery tomorrow     Renal / Fluids / Electrolytes:   #Hypernatremia, improving   #Hypokalemia, resolved   #CKD stage 3  #Lactic acidosis, resolved  #IMAN on CKD, resolved   BL creat ~1.2-1.3, most recent creatinine 1.03  FLUID STATUS: Pre-op weight 70.9 kg, current weight 73.2kg   - HOLD PTA spironolactone 25 mg daily  - HOLD PTA torsemide   - Elevated sodium, likely 2/2 aggressive diureses S/p bumex and chlorotiazide. Continue free water flushes @ 60 mL/Hr. Na downtrending, 147 today.   - Goal to be euvolemic to net negative 500 today. However, outputs are not accurately reflected secondary to unmeasured outputs.   - HOLD off on further diuresis.   - Discontinued potassium replacements.   - Replete lytes per protocol  - Avoid/limit nephrotoxins as able  - Strict I/O, daily weights, avoid/limit nephrotoxins     Endocrine:    #Stress induced hyperglycemia  #T2DM  Hgb A1c 5.7%  - Medium Sliding scale insulin   - PTA Jardiance on hold, resume once medically optimized   - Goal BG <180 for optimal healing     ID / Antibiotics:  #Stress induced leukocytosis  # Probable aspiration pneumonitis vs pneumonia  - 11/30 Zosyn 7-day course for pneumonia  - 12/6 Given further decompensation, Vanco and meropenem was started on 12/6   - Finish meropenem 7 day course (12/12)   - Finish Vancomycin PO 7 day course for c-diff (12/13)  - Follow up BC x 2, UA and  BAL  - Blood culture with NGTD   - Urine with Candida and sputum with Yeast, likely both colonizations.   - Urine culture with NGTD  -Repeat cultures 12/10   - Blood cultures with NGTD  - Sputum with Yeast  #Hx of recent C. Difficile  - C. Difficile toxin PCR positive 12/3 with negative antigen and negative toxin - per antimicrobial stewardship team, more likely to be colonization with recent history and no apparent treatment  - Continue oral vancomycin course to finish 12/13  - Monitor fever curve, WBC, and inflammatory markers as appropriate  - Continue enteric precautions per infection prevention protocol      Heme:     # Acute blood loss anemia  No s/sx active bleeding  - CBC   - Hgb goal > 7.0     MSK / Skin:  #Mini-thoracotomy  #Rib fractures  #Surgical incision  - Mini-thoracotomy precautions  - Incisional cares per protocol  - Postop incision management protocol  - PT/OT/CR    Prophylaxis:    - VTE: SCD's, SQH  - Bowel regimen: Famotidine, bowel medications discontinued in setting of loose stools     Lines/ tubes/ drains:  - ETT  - PICC line   - Arterial Line  - Rectal Tube   - NJT    Disposition:  - CVICU    ICU Checklist  F - Feeding: NJ   A - Analgesia: prns   S - Sedation: Precedex  T - Thromboembolic prophylaxis: SQH      H - Head of bed elevated: yes   U - Ulcer prophylaxis: famotidine   G - Glycemic control: sliding scale insulin   S - SBT-tolerating PS      B - Bowel regimen- currently on hold   I - Indwelling catheter- none, external   D - De-escalation of antibiotics: finishing meropenem 12/12, vancomycin 12/13    Patient seen, findings and plan discussed with CVICU staff and cardiothoracic surgeons.    Quin Langford DO   Anesthesiology Resident   PGY2/CA1    ICU *[id number]    ====================================    TODAY'S PROGRESS  SUBJECTIVE  Reports doing well without complaints of pain. Endorses nausea. Also reports discomfort with external urinary catheter.     OBJECTIVE  1. VITAL  SIGNS  Temp:  [98  F (36.7  C)-98.8  F (37.1  C)] 98.3  F (36.8  C)  Pulse:  [] 60  Resp:  [11-33] 15  BP: (126)/(55) 126/55  MAP:  [56 mmHg-272 mmHg] 63 mmHg  Arterial Line BP: (101-169)/() 116/40  FiO2 (%):  [40 %] 40 %  SpO2:  [80 %-100 %] 100 %  Vent Mode: CPAP/PS  (Continuous positive airway pressure with Pressure Support)  FiO2 (%): 40 %  Resp Rate (Set): 18 breaths/min  Tidal Volume (Set, mL): 390 mL  PEEP (cm H2O): 5 cmH2O  Pressure Support (cm H2O): 7 cmH2O  Resp: 15      2. INTAKE/ OUTPUT  I/O last 3 completed shifts:  In: 3136.7 [I.V.:496.7; NG/GT:1560]  Out: 1500 [Urine:1200; Stool:300]    3. PHYSICAL EXAMINATION  General: orally intubated, follows commands on all extremities alert and oriented, writes on paper to communicate  Neuro: A&O, follows commands, answers questions by writing on paper  Resp: Coarse breath sounds bilaterally, mechanically ventilated  CV: paced around 60, extremities well perfused and DP intact.   Abdomen: Soft, non-distended, non-tender  Incisions: thoracotomy incision is cdi  Extremities: warm and well perfused. No peripheral edema noted.     4. INVESTIGATIONS  Arterial Blood Gases   Recent Labs   Lab 12/12/23  0452 12/11/23 2014 12/11/23  1729 12/11/23  0421   PH 7.49* 7.52* 7.50* 7.57*   PCO2 42 38 43 38   PO2 91 99 100 120*   HCO3 32* 31* 34* 35*     Complete Blood Count   Recent Labs   Lab 12/12/23  0450 12/11/23  0419 12/10/23  0359 12/09/23  0338   WBC 11.9* 10.2 16.5* 11.9*   HGB 8.9* 8.8* 9.6* 9.4*    236 285 236     Basic Metabolic Panel  Recent Labs   Lab 12/12/23  0450 12/11/23  2358 12/11/23  1952 12/11/23  1647 12/11/23  1644 12/11/23  0430 12/11/23  0419 12/10/23  1553 12/10/23  1221   *  --   --   --  149*  --  149*  150*  --  151*   POTASSIUM 4.7  --   --   --  3.8  3.8  --  3.0*  3.0*  --  4.1   CHLORIDE 107  --   --   --  113*  --  106  106  --  109*   CO2 29  --   --   --  27  --  30*  32*  --  28   BUN 52.9*  --   --   --  51.2*  " --  54.3*  54.3*  --  57.8*   CR 1.03*  --   --   --  0.94  --  1.06*  1.04*  --  1.18*   *  147* 174* 138*   < > 102*   < > 159*  168*   < > 136*  157*    < > = values in this interval not displayed.     Liver Function Tests  Recent Labs   Lab 12/12/23  0450 12/11/23  0419 12/10/23  0359 12/09/23  0338   AST 48* 40 43 25   ALT 47 34 32 20   ALKPHOS 95 79 78 65   BILITOTAL 0.3 0.3 0.3 0.3   ALBUMIN 3.2* 3.0* 3.2* 3.2*   INR 1.15 1.20* 1.45* 2.51*     Pancreatic Enzymes  No lab results found in last 7 days.  Coagulation Profile  Recent Labs   Lab 12/12/23  0450 12/11/23  0419 12/10/23  0359 12/09/23  0338 12/07/23  0128 12/06/23  2210 12/06/23  1905   INR 1.15 1.20* 1.45* 2.51*   < > 3.90* 3.52*   PTT  --   --   --   --   --  36 83*    < > = values in this interval not displayed.     Lactate  Invalid input(s): \"LACTATE\"    5. RADIOLOGY  No results found for this or any previous visit (from the past 24 hour(s)).       "

## 2023-12-12 NOTE — PROGRESS NOTES
Brief Otolaryngology Progress Note  12/12/23    Patient is scheduled for open tracheostomy in the OR with Dr. Pitts tomorrow, 12/13/23 at 12:25pm.     - Please hold TFs after midnight tonight for OR   - Hold heparin drip at least 6 hours prior to OR   - Written consent obtained and in chart  - Remainder of care per primary team    Soni Hannah PA-C  Otolaryngology-Head & Neck Surgery  Please contact ENT by dialing [phone] and entering job code [phone].

## 2023-12-12 NOTE — PROGRESS NOTES
Care Management Follow Up    Length of Stay (days): 14    Expected Discharge Date:  12/14/23     Concerns to be Addressed: Discharge planning     Patient plan of care discussed at interdisciplinary rounds: Yes    Anticipated Discharge Disposition:  LTAC     Anticipated Discharge Services: Vent weaning   Anticipated Discharge DME:  None    Education Provided on the Discharge Plan:  Yes  Patient/Family in Agreement with the Plan:  Yes    Referrals Placed by CM/SW:  Yes, Referral sent to [facility], [facility] and [facility]    Additional Information:  Per discussion with the team and chart review, pt will have trach placement done tomorrow, 12/13 and might be medically ready for LTAC on Thursday, 12/14.    RNCC visited pt and dtrSusie to discus about LTAC and the referral process.  Pt and Susie state the team have been updating them well about the plan of care.  RNCC discussed about LTAC and the referral process.  Pt and dtr agreed referral to be send for both [facility] and [facility].  Pt dtr stated pt has been at [facility] 5 yrs ago for about 3 months.  They are not sure about the new facility.  Pt dtr stated she will discuss with her siblings and will update RNCC about their preference if accepted by both facilities and bed is available at both facilities.  Referral sent for both [facility] and [facility].  RNCC will cont to follow the plan of care.      Rachel Espinosa RN, PHN, BSN  4A and 4E/ ICU  Care Coordinator  Phone: [phone]  Pager: [phone]      SEARCHABLE in [facility] - search CARE COORDINATOR     [place] ([phone]) Saturday & Sunday; ([phone]) [facility] Recognized Holidays     Units: 5A & 5C  Pager: [phone]    Units: 6B, 6C & 6D    Pager: [phone]    Units: 7A, 7B, & 7C   Pager: [phone]    Units: 6A & ICU   Pager: [phone]    Units: 5 Ortho, 5MS & WB ED Pager: [phone]    Units: 6MS, 8A & 10 ICU  Pager [phone]

## 2023-12-12 NOTE — PLAN OF CARE
Major Shift Events:  Pt A&Ox4 able to follow commands and make needs known. Vpaced rates at 60. Afebrile. PS 7/5 40% FiO2. Advanced ETT to 23cm from 20cm. Few episodes of nausea today d/t to repositioning and placement of the ETT, compazine and zofran given. Pt requested TF to be stopped at time of nausea, see flowsheets for times. Still continues to have multiple  loose stools. Incontinent at times or will use commode. Ambulate x1 steady gait.      Plan: Plan for trach placement tomorrow.   For vital signs and complete assessments, please see documentation flowsheets.

## 2023-12-12 NOTE — PLAN OF CARE
Major Shift Events: Neuros unchanged. A-flutter, v-paced at 60 bpm. Levo needed temporarily overnight for MAPs <65. Has been off since 0000. Afebrile. PS 7/5, 40% FiO2. C-diff +, rectal tube in place. NJ with TF at 45 mL/hr, 60 mL flushes Q1hr. Purewick in place. R TL PICC, R radial art, L PIV x1  Plan: Plan for trach placement Wednesday   For vital signs and complete assessments, please see documentation flowsheets.

## 2023-12-12 NOTE — PLAN OF CARE
Neuro: Aox4. Writing communications very well. Opens eyes spontaneously. VERDIN     CV: Rate/rhythm: 100% V paced, 60-70s. A flutter intrinsic rhythm.      Pulm: PS overnight if tolerating. See 0400 ABG- metabolic alkalosis     GI: NPO, NJ bridled at 89. TF @ goal 45 mL/hr. Rectal tub     : External catheter in place. Most voids measured, some leakage.     Skin: Old R groin incision, scattered generalized bruising, Old R CT site, Brittany area reddened, sacrum/coccyx mepilex.      Drains: R radial  art line(positional), R PICC, L PIV     PT and OT consulted    All questions from family and patient have been addressed  All safety checks complete  MD aware of all patient status changes and updated promptly  See chart for all other details       Plan of Care Reviewed With: patient    Overall Patient Progress: improvingOverall Patient Progress: improving

## 2023-12-12 NOTE — PROGRESS NOTES
[facility]    On 12/11 received referral from Rachel, Care Manager.  I reviewed the chart for potential admission to [facility] pending clinical readiness and bed availability.  Once the patient is off of sedation infusions and has trach and is able to continues to tolerate weaning trials, we anticipate her to be clinically appropriate for [facility]. She will not require prior authorization for LTACH admission from her insurance.     Will continue to follow for appropriateness and bed availability.    Nabila Simon, PT   LTACH Referral Specialist    [facility]    [address] [address]  Admissions Office: [phone]  Fax: [phone]     CONFIDENTIAL Protected under [place] Statute  145.61 et seq

## 2023-12-13 NOTE — PLAN OF CARE
Major Shift Events: Neuro unchanged. Declines pain. V paced. BP WNL but 500mL bolus given for low BP overnight. Afebrile. Small amount of thick respiratory secretions. Went down for trach at 1445. Tube feeding held. Voided on commode x1. Lots of loose stool and mostly incontinent. Rectal tube placed.     Plan: Continue with current POC    For vital signs and complete assessments, please see documentation flowsheets.

## 2023-12-13 NOTE — PLAN OF CARE
Major Shift Events:  A&Ox4. Nodding appropriately, follows commands, writing to make needs known. V-paced, HR mostly 60, up to 80 at times. BP soft while sleeping, MAP down to low 50's. Levophed started to meet MAP goal > 65. When awake, MAP 80's off of levophed. Titrated on and off levo throughout night. Afebrile. PS 7/5 FiO2 40% throughout night. NJ tube in place, TF stopped at midnight for trach placement. Inconsistently continent of urine and stool, unable to get accurate output.     Plan: Trach placement today.    For vital signs and complete assessments, please see documentation flowsheets.

## 2023-12-13 NOTE — PROGRESS NOTES
CV ICU PROGRESS NOTE  12/13/2023          ASSESSMENT: Priya Funez is a 83 year old female with a PMH of HFpEF, severe TR, permanent AF s/p AVN ablation with PPM implant 1/11/19 s/p extraction TV (transvenous) PPM and implant leadless PPM 7/31/23, emergent aortic dissection repair 1/4/19, HTN, CKD, NEDA, and obesity who presented as an outpatient for elective TVR. Patient is now s/p mini TVR on 11/28/2023 with Dr. Mabry. Transferred to floor 12/2. RRT called 12/6 for worsening hypoxia and resp distress; subsequently had respiratory arrest with short CPR prior to ROSC. Intubated and transferred back to ICU.      CO-MORBIDITIES:   S/P TVR (tricuspid valve replacement)  (primary encounter diagnosis)  Complete heart block (H)  Displacement of atrial pacemaker leads, initial encounter    Today's Progress:  - OR with ENT at 1230 for trach   - Once returns from OR will get CXR, trial trach mask, restart TF,     PLAN:  Neuro/ pain/ sedation:  - Monitor neurological status. Notify the MD for any acute changes in exam.  #Acute postoperative pain  - PRN: Tylenol, oxycodone  #Sedation  - Gtt: discontinue Precedex   - Scheduled Melatonin HS   #Hx of general anxiety disorder  - PTA Lexapro and Remeron     Pulmonary care:   #Mechanical ventilation  #Acute respiratory failure and arrest on 12/6/23   #Acute hypercapnic and hypoxic respiratory failure requiring BiPAP  #Aspiration pneumonitis vs pneumonia  #Possible aspiration event 12/1  #Near complete plugging of left lower lobe on CT   #Acute mildly displaced and nondisplaced right 3-5 rib fractures with associated right chest wall hematoma and nondisplaced anterolateral left fourth and fifth rib fractures  - RRT called 12/6 for worsening hypoxia and resp distress; subsequently had respiratory arrest with short CPR prior to ROSC. Intubated and transferred back to ICU.   - Intubated  Vent Mode: CPAP/PS  (Continuous positive airway pressure with Pressure Support)  FiO2 (%): 40  %  PEEP (cm H2O): 5 cmH2O  Pressure Support (cm H2O): 7 cmH2O  Resp: 17  - Mucomyst, duoneb w/ RT Q4H   - ABG daily   - Bronch 12/7 (+) Candida albicans  - OR today around 1230 with ENT for trach   - Follow up post-op CXR  - Trach mask trials post-op   - LTAC preference is [place] per family      Cardiovascular:    # S/p respiratory arrest 12/6  # Hx of severe TR s/p TVR  # HFpEF  # Hx of Afib s/p AVN ablation - PPM, upgraded to leadless 7/2023  # Hypertension  # Hyperlipidemia  # Hx of aortic dissection repair 2019  # Respiratory arrest 12/6   # Elevated BNP, improving   Stable paced rhythm with underlying atrial flutter. On review of cardiology, underlying aflutter/afib has been present since at least June of this year.   Pre-op 9/8/23 echo: LVEF 55/60%, mildly dilated RV, normal function  Echo 12/4: LVEF 60-65%, normal RV function, TV mean gradient 5 mmHg, no TR  - Echo 12/7 with no significant change from previous with the exception of mild reduction of RV. RV free wall dyskinesia   - Device RN adjusted PPM to PTA settings rate 60   - ASA 81 mg daily  - Hold PTA spironolactone 25 mg daily  - Discontinue the held Metoprolol 12.5 mg BID (PTA 25 mg daily)    - Discontinue norepinephrine  - Give 500mL LR fluid bolus, soft pressures overnight   - Warfarin held for potential procedure. Previously supra therapeutic. Per CVTS no heparin bridge needed at this time. Anticoagulated for underlying afib/flutter. INR 1.1 today.      GI care / Nutrition:   # Multiple loose stools   # Hx of C. Difficile (9/2023)  # Nausea, improved    - NJT and enteral feeds at goal (45 mL/hr) held since midnight, will restart once she returns from OR today   - Prior to reintubation SLP recommended regular diet with thin liquids. Patient with history of dysphagia, possible aspiration event on 12/1. Will get SLP evaluation after OR today   - PPI famotidine, will resume after surgery today   - Bowel regimen discontinued due to continued loose  stools  - Will finish vancomycin po 7 day course (as described below) today    - Continue Banatrol after surgery   - PRN Zofran  4 mg PO or IV Q6H  - PRN Compazine 5 mg IV Q6H     Renal / Fluids / Electrolytes:   #Hypernatremia, resolved   #Hypokalemia, resolved   #CKD stage 3  #Lactic acidosis, resolved  #IMAN on CKD, resolved   BL creat ~1.2-1.3, most recent creatinine 1.03  FLUID STATUS: Pre-op weight 70.9 kg, current weight 72.5 kg (slightly down from 73.2 kg yesterday)    - HOLD PTA spironolactone 25 mg daily  - HOLD PTA torsemide   - Elevated sodium, likely 2/2 aggressive diureses S/p bumex and chlorotiazide. Will decrease free water flushes to @ 45 mL/Hr. Na downtrending, 145 today. On hold until after surgery today.   - I/O inaccurate due to unmeasured stools and voids   - HOLD off on further diuresis  - Giving 500 mL LR bolus, has soft pressures overnight  - Replete lytes per protocol  - Avoid/limit nephrotoxins as able  - Strict I/O, daily weights, avoid/limit nephrotoxins     Endocrine:    #Stress induced hyperglycemia  #T2DM  Hgb A1c 5.7%  - Medium Sliding scale insulin   - PTA Jardiance on hold, resume once medically optimized   - Goal BG <180 for optimal healing     ID / Antibiotics:  #Stress induced leukocytosis  # Probable aspiration pneumonitis vs pneumonia  - 11/30 Zosyn 7-day course for pneumonia finished   - 12/6 Given further decompensation, Vanco and meropenem was started on 12/6   - Finish meropenem 7 day course (12/12)   - Finishing Vancomycin PO 7 day course for c-diff today (12/13)  - Follow up BC x 2, UA and BAL  - Blood culture with NGTD   - Urine with Candida and sputum with Yeast, likely both colonizations.   - Urine culture with NGTD  -Repeat cultures 12/10   - Blood cultures with NGTD  - Sputum with Yeast  - Afebrile, WBC increased today, will continue to monitor for now   #Hx of recent C. Difficile  - C. Difficile toxin PCR positive 12/3 with negative antigen and negative toxin - per  antimicrobial stewardship team, more likely to be colonization with recent history and no apparent treatment  - Continue oral vancomycin course to finish 12/13  - Monitor fever curve, WBC, and inflammatory markers as appropriate  - Continue enteric precautions per infection prevention protocol      Heme:     # Acute blood loss anemia  No s/sx active bleeding  - CBC   - Hgb goal > 7.0     MSK / Skin:  #Mini-thoracotomy  #Rib fractures  #Surgical incision  #Perineal irritation   - Mini-thoracotomy precautions  - Incisional cares per protocol  - Postop incision management protocol  - WOC consult for perineal irritation, frequent loose stools and voids    - PT/OT/CR    Prophylaxis:    - VTE: SCD's, SQH- held for surgery   - Bowel regimen: Famotidine-held for surgery, bowel medications discontinued in setting of loose stools     Lines/ tubes/ drains:  - ETT  - PICC line   - Arterial Line  - NJT    Disposition:  - CVICU    ICU Checklist  F - Feeding: NJ - TF on hold for surgery, resume after  A - Analgesia: prns   S - Sedation: off   T - Thromboembolic prophylaxis: SQH- held for surgery      H - Head of bed elevated: yes   U - Ulcer prophylaxis: famotidine- held for surgery   G - Glycemic control: sliding scale insulin   S - SBT-tolerating PS      B - Bowel regimen- currently on hold, loose stools   I - Indwelling catheter- none, external   D - De-escalation of antibiotics: finished meropenem 12/12, finishing vancomycin today 12/13    Patient seen, findings and plan discussed with CVICU staff and cardiothoracic surgeons.    Quin Langford DO   Anesthesiology Resident   PGY2/CA1    ICU *[id number]    ====================================    TODAY'S PROGRESS  SUBJECTIVE  Patient reports improvement in nausea. Denies significant pain overnight. Ready for OR today.     OBJECTIVE  1. VITAL SIGNS  Temp:  [97.7  F (36.5  C)-98.6  F (37  C)] 98.6  F (37  C)  Pulse:  [60-93] 61  Resp:  [11-26] 17  BP: ()/(44-62) 69/44  MAP:   [53 mmHg-149 mmHg] 88 mmHg  Arterial Line BP: ()/() 141/60  FiO2 (%):  [40 %] 40 %  SpO2:  [96 %-100 %] 100 %  Vent Mode: CPAP/PS  (Continuous positive airway pressure with Pressure Support)  FiO2 (%): 40 %  PEEP (cm H2O): 5 cmH2O  Pressure Support (cm H2O): 7 cmH2O  Resp: 17      2. INTAKE/ OUTPUT  I/O last 3 completed shifts:  In: 1965.55 [I.V.:405.55; NG/GT:1110]  Out: 725 [Urine:250; Other:400; Stool:75]    3. PHYSICAL EXAMINATION   General: orally intubated, follows commands on all extremities alert and oriented, writes on paper to communicate  Neuro: A&O, follows commands, answers questions by writing on paper  Resp: breath sounds clear bilaterally, on intubated on PS   CV: V-paced around 60, extremities well perfused and DP intact.   Abdomen: Soft, non-distended, non-tender  Incisions: thoracotomy incision is cdi   Extremities: warm and well perfused. Minimal/trace peripheral edema noted.     4. INVESTIGATIONS  Arterial Blood Gases   Recent Labs   Lab 12/13/23  0405 12/12/23  0452 12/11/23 2014 12/11/23  1729   PH 7.44 7.49* 7.52* 7.50*   PCO2 48* 42 38 43   PO2 112* 91 99 100   HCO3 32* 32* 31* 34*     Complete Blood Count   Recent Labs   Lab 12/13/23  0406 12/12/23  0450 12/11/23  0419 12/10/23  0359   WBC 16.9* 11.9* 10.2 16.5*   HGB 8.7* 8.9* 8.8* 9.6*    254 236 285     Basic Metabolic Panel  Recent Labs   Lab 12/13/23  0406 12/13/23  0404 12/12/23  2313 12/12/23  2051 12/12/23  1539 12/12/23  0850 12/12/23  0450 12/11/23  1647 12/11/23  1644     --   --   --  148*  --  147*  --  149*   POTASSIUM 4.2  --   --   --  4.3  --  4.7  --  3.8  3.8   CHLORIDE 106  --   --   --  108*  --  107  --  113*   CO2 29  --   --   --  29  --  29  --  27   BUN 50.0*  --   --   --  52.5*  --  52.9*  --  51.2*   CR 1.01*  --   --   --  0.99*  --  1.03*  --  0.94   * 117* 123* 135* 102*   < > 144*  147*   < > 102*    < > = values in this interval not displayed.     Liver Function  "Tests  Recent Labs   Lab 12/13/23  0406 12/12/23  0450 12/11/23  0419 12/10/23  0359   AST 50* 48* 40 43   ALT 50 47 34 32   ALKPHOS 95 95 79 78   BILITOTAL 0.4 0.3 0.3 0.3   ALBUMIN 3.1* 3.2* 3.0* 3.2*   INR 1.10 1.15 1.20* 1.45*     Pancreatic Enzymes  No lab results found in last 7 days.  Coagulation Profile  Recent Labs   Lab 12/13/23  0406 12/12/23  0450 12/11/23  0419 12/10/23  0359 12/07/23  0128 12/06/23  2210 12/06/23  1905   INR 1.10 1.15 1.20* 1.45*   < > 3.90* 3.52*   PTT  --   --   --   --   --  36 83*    < > = values in this interval not displayed.     Lactate  Invalid input(s): \"LACTATE\"    5. RADIOLOGY  Recent Results (from the past 24 hour(s))   XR Chest Port 1 View    Narrative    EXAM: XR CHEST PORT 1 VIEW 12/12/2023 1:53 PM    DEMOGRAPHICS: 83 years Female    INDICATION: Recheck ETT placement    COMPARISON: Chest x-ray 12/12/2023 (0052), 12/11/2023, 12/10/2023.    TECHNIQUE: Single portable AP view of the chest.    FINDINGS:   Endotracheal tube tip terminates in the midthoracic trachea, 4.7 cm  above the level of the keegan. Enteric tube extends towards the  stomach, out of view. Right-sided PICC terminates in the cavoatrial  junction.     Stable median sternotomy wires, mediastinal surgical clips. Trachea is  midline. Stable cardiac silhouette. Unchanged elevation of the right  hemidiaphragm. No pneumothorax. Trace blunting of the right  costophrenic angle. Diffuse perihilar and bibasilar streaky opacities,  similar to prior chest x-ray on same day.      Impression    IMPRESSION:   1.  Endotracheal tube tip terminates in the midthoracic trachea, 4.7  cm above the level of the keegan. Remainder of tubes/lines appear  stable as above.  2.  Persistent elevation of the right hemidiaphragm with adjacent  streaky opacities, likely atelectasis.  3.  Unchanged perihilar and basilar interstitial opacities.    I have personally reviewed the examination and initial interpretation  and I agree with the " findings.    NASEEM RUEDA MD         SYSTEM ID:  [id number]   XR Chest Port 1 View    Narrative    Exam: XR CHEST PORT 1 VIEW, 12/12/2023 2:05 PM    Comparison: Radiograph 12/12/2023, 12/11/2023    History: ETT Recheck s/p advancement    Findings:  Portable AP view of the chest. Endotracheal tube tip has been  advanced, now projecting 2.8 cm above the level of the keegan. Feeding  tube tip projects beyond the field-of-view. Right upper extremity PICC  line tip projects over the low right atrium. Stable postsurgical  changes of the chest with median sternotomy wires and mediastinal  clips. Micra device. Tricuspid valve replacement. Stable enlarged  cardiac mediastinal silhouette. Aortic arch calcifications. Similar  mild elevation of the right hemidiaphragm. Persistent streaky  perihilar and left basilar opacities. Suspected small right pleural  effusion. No appreciable pneumothorax.      Impression    Impression:   1. Endotracheal tube tip has been advanced now projecting 2.8 cm above  the level of the keegan. Additional support devices as described  above.  2. Similar elevation of the right hemidiaphragm with adjacent right  basilar.  3. Persistent streaky perihilar and left basilar opacities with small  right pleural effusion.    I have personally reviewed the examination and initial interpretation  and I agree with the findings.    SAMI EVANS MD         SYSTEM ID:  [id number]

## 2023-12-13 NOTE — BRIEF OP NOTE
[facility]    Brief Operative Note    Pre-operative diagnosis: Acute respiratory failure with hypoxia (H) [J96.01]  Post-operative diagnosis Same as pre-operative diagnosis    Procedure: tracheotomy placement, N/A - Neck  flexible bronchoscopy, N/A - Throat    Surgeon: Surgeon(s) and Role:     * Rita Pitts MD - Primary     * Alen Deleon MD - Resident - Assisting     * Marc Worthy MD - Resident - Assisting  Anesthesia: General   Estimated Blood Loss: Minimal    Drains: None  Specimens: * No specimens in log *  Findings:   C/w previous tracheostomy .  Complications: None.  Implants: * No implants in log *        PLAN  - routine trach cares  - ENT will cut sutures POD5    Alen Deleon MD  ENT resident

## 2023-12-13 NOTE — OP NOTE
Operative Note   Otolaryngology - Head and Neck Surgery       DATE OF OPERATION:   December 13, 2023    PREOPERATIVE DIAGNOSIS:   Respiratory failure  Prior tracheotomy     POSTOPERATIVE DIAGNOSIS:   Same    NAME OF OPERATION:   Tracheotomy  Flexible bronchoscopy    ANESTHESIA  Type: general   ETT: oral tube changed to 6 cuffed shiley    SURGEON:   Rita Pitts MD    RESIDENT SURGEON(S):   MD Marc Moody MD    INDICATIONS FOR PROCEDURE:   The patient is a 83 year old female with history of tracheotomy in the past with decannulation now requiring repeat trach. The patient comes in for repeat trach. Risks, benefits and alternatives were discussed. The patient wishes to proceed with surgery and has signed an informed consent.     COMPLEXITY  This surgery was more complex than normally because of performing the surgery with history of prior tracheotomy. The approach therefore was more difficult technically than normally.  Significantly more time was required to perform all parts of the operation, especially finding the anterior wall of the trachea given her prior tracheotomy and ensuing significant scar.       FINDINGS:   1. Very scarred tissue over the prior trach site   2. 6 shiley placed without difficulty     DESCRIPTION OF PROCEDURE:   The patient was brought into the operating room and placed supine on the operating room table. A time-out was performed and anesthesia was induced. The patient was ventilated through her ETT.    A shoulder roll was placed to provide neck extension. Then a 2cm horizontal neck incision between the cricoid and sternal notch was outlined. Then 4cc of 1% lidocaine with 1:100,000 epinephrine was injected. Then a horizontal incision was made through the skin down through the subcutaneous tissue. Then the midline raphe was identified. Then retractors were used to lateralize the strap muscles. The cricoid was then identified. The fascial plane between the thyroid  isthmus and the anterior tracheal wall was identified. The thyroid isthmus was split with the use of a bovie and bipolar. Hemostasis was confirmed. Then the trachea came into view. A cricoid hook was used to provide superior retraction. Then, an incision was made between the 1st and 2nd tracheal rings with an 11 blade. Then the incision was extended with a mets scissors. The ETT came into view this was slowly retracted until the distal tip was just above the incision. Then a 6.0 shiley cuffed trach was placed and CO2 was confirmed. The trach was secured with 2.0 prolene stitches and a trach tie.     Then a flexible bronchoscopy was performed with a 2.0 bronchoscope through the trach. This proved good positioning and clear trachea down to keegan. The bronchoscope was then passed into the right and left mainstem bronchus. Here right mainstem secretions  were suctioned out.                                   COMPLICATIONS:   None.  .   ESTIMATED BLOOD LOSS:   Less than 10cc    DISPOSITION:   PACU.    SPECIMENS:  * No specimens in log *       PHOTODOCUMENTATION:

## 2023-12-13 NOTE — PLAN OF CARE
Problem: Oral Intake Inadequate  Goal: Improved Oral Intake  Outcome: Progressing  Intervention: Promote and Optimize Oral Intake  Flowsheets (Taken 12/13/2023 1426)  Nutrition Interventions: (Continue enteral regimen) other (see comments)  Oral Nutrition Promotion: (Enteral regimen continue) other (see comments)   Goal Outcome Evaluation: Progressing    Zenia Huerta  Dietetic Intern

## 2023-12-13 NOTE — ANESTHESIA POSTPROCEDURE EVALUATION
Patient: Priya Funez    Procedure: Procedure(s):  tracheotomy placement  flexible bronchoscopy       Anesthesia Type:  General    Note:  Disposition: Inpatient   Postop Pain Control: Uneventful            Sign Out: Well controlled pain   PONV: No   Neuro/Psych: Uneventful            Sign Out: Acceptable/Baseline neuro status   Airway/Respiratory: Uneventful            Sign Out: AIRWAY IN SITU/Resp. Support               Airway in situ/Resp. Support: Tracheostomy   CV/Hemodynamics: Uneventful            Sign Out: Acceptable CV status; No obvious hypovolemia; No obvious fluid overload   Other NRE: NONE   DID A NON-ROUTINE EVENT OCCUR?     Event details/Postop Comments:  Awake, comfortable in ICU.  Amy Fraser MD         Last vitals:  Vitals:    12/13/23 1300 12/13/23 1400 12/13/23 1700   BP:      Pulse: 60 60    Resp: 21 17    Temp:   37.1  C (98.7  F)   SpO2: 100% 100%        Electronically Signed By: Amy Fraser MD  December 13, 2023  4:56 PM

## 2023-12-13 NOTE — PROGRESS NOTES
Care Management Follow Up    Length of Stay (days): 15    Concerns to be Addressed: Discharge planning     Patient plan of care discussed at interdisciplinary rounds: Yes     Anticipated Discharge Disposition:  LTAC     Anticipated Discharge Services: Vent weaning   Anticipated Discharge DME:  None     Education Provided on the Discharge Plan:  Yes  Patient/Family in Agreement with the Plan:  Yes    Additional Information:  Pt have been assessed by [place] and [facility].  Pt meets medical criteria.  Pt and family reported, their preference is [facility].  [facility] liaison have been updated.  RNCC will cont to follow the plan of care.      Rachel Espinosa RN, PHN, BSN  4A and 4E/ ICU  Care Coordinator  Phone: [phone]  Pager: [phone]      SEARCHABLE in [facility] - search CARE COORDINATOR     [place] [facility] [place] ([phone]) Saturday & Sunday; ([phone]) [facility] Recognized Holidays     Units: 5A & 5C  Pager: [phone]    Units: 6B, 6C & 6D    Pager: [phone]    Units: 7A, 7B, & 7C   Pager: [phone]    Units: 6A & ICU   Pager: [phone]    Units: 5 Ortho, 5MS & WB ED Pager: [phone]    Units: 6MS, 8A & 10 ICU  Pager [phone]

## 2023-12-13 NOTE — CONSULTS
[facility] Nurse Inpatient Assessment     Consulted for: diarrhea      Patient History (according to Medicine provider note(s) 12/13/23:      Priya Funez is a 83 year old female with a PMH of HFpEF, severe TR, permanent AF s/p AVN ablation with PPM implant 1/11/19 s/p extraction TV (transvenous) PPM and implant leadless PPM 7/31/23, emergent aortic dissection repair 1/4/19, HTN, CKD, NEDA, and obesity who presented as an outpatient for elective TVR. Patient is now s/p mini TVR on 11/28/2023 with Dr. Mabry. Transferred to floor 12/2. RRT called 12/6 for worsening hypoxia and resp distress; subsequently had respiratory arrest with short CPR prior to ROSC. Intubated and transferred back to ICU.         Assessment:      Areas visualized during today's visit: Perineal area and Sacrum/coccyx    Wound location: perineal and groin folds    Last photo: none taken today  Wound due to: Incontinence Associated Dermatitis (IAD)  Wound history/plan of care: Pt has c. Diff with frequent loose stools. Floor RN not sure what other staff have been using but there was dry wipes, criticaid paste and freida in the room.   Wound base: 100 % erythema, on intact skin with some satellite red areas     Palpation of the wound bed: normal      Drainage: none     Description of drainage: none     Measurements (length x width x depth, in cm): no open areas noted     Periwound skin: Intact      Color: normal and consistent with surrounding tissue      Temperature: normal   Odor: none  Pain: mild and during dressing change, tender  Pain interventions prior to dressing change: soaking and slow and gentle cares   Treatment goal: Heal , Maintain (prevention of deterioration), and Protection  STATUS: initial assessment  Supplies ordered: supplies stored on unit, discussed with RN, and discussed with patient        Treatment Plan:     Perineal and groin folds wound(s): BID and PRN with loose  stools  Cleanse the area with Juarez cleanse and protect, very gently with DRY soft cloth. AVOID use of wet wipes or if needed use Juarez and dry wipes at the end of cares to help protect skin.  Apply thin layer of Juarez antifungal 2x daily- may use critic aid paste all other times.  With repeat application, do not scrub the paste, only remove soiled paste and reapply.  If complete removal of paste is necessary use baby oil/mineral oil (#[id number]) and soft wash cloth.  Ensure pt has Angel-cushion (#[id number]) while sitting up in the chair.  Use only one Covidien pad in between mattress and pt. No brief in bed.     Orders: Reviewed    RECOMMEND PRIMARY TEAM ORDER: None, at this time  Education provided: plan of care  Discussed plan of care with: Patient and Nurse  WOC nurse follow-up plan: weekly  Notify WOC if wound(s) deteriorate.  Nursing to notify the Provider(s) and re-consult the WOC Nurse if new skin concern.    DATA:     Current support surface: Standard  Low air loss (JUAN DIEGO pump, Isolibrium, Pulsate, skin guard, etc)  Containment of urine/stool: Incontinent pad in bed  BMI: Body mass index is 27.87 kg/m .   Active diet order: Orders Placed This Encounter      NPO per Anesthesia Guidelines for Procedure/Surgery Except for: Meds     Output: I/O last 3 completed shifts:  In: 1965.55 [I.V.:405.55; NG/GT:1110]  Out: 725 [Urine:250; Other:400; Stool:75]     Labs:   Recent Labs   Lab 12/13/23  0406   ALBUMIN 3.1*   HGB 8.7*   INR 1.10   WBC 16.9*     Pressure injury risk assessment:   Sensory Perception: 3-->slightly limited  Moisture: 3-->occasionally moist  Activity: 3-->walks occasionally  Mobility: 3-->slightly limited  Nutrition: 3-->adequate  Friction and Shear: 2-->potential problem  Jean-Claude Score: 17    Siobhan Leyva RN CWOCN  Pager no longer is use, please contact through [employer] group: [facility] Nurse [place]  Dept. Office Number: *[phone]

## 2023-12-13 NOTE — PROGRESS NOTES
CLINICAL NUTRITION SERVICES - REASSESSMENT NOTE     Nutrition Prescription    RECOMMENDATIONS FOR MDs/PROVIDERS TO ORDER:  None at this time.    Malnutrition Status:    Severe malnutrition in the context of acute illness    Recommendations already ordered by Registered Dietitian (RD):  Continue TF regimen post-surgery, as follows:    Osmolite 1.5 Adan (or equivalent) @ goal of 45ml/hr (1080ml/day) + 1 pkt Prosource TF20 provides: 1700 kcals (30 kcal/kg), 87 g PRO (1.5 g/kg), 822 ml free H20, 219 g CHO, and 0 g fiber daily.     Modify water flushes to 45 mL q4.    Continue Banatrol administration for diarrhea    Future/Additional Recommendations:  Recommend supplement implementation and cycled TF once approved for oral diet per SLP.     EVALUATION OF THE PROGRESS TOWARD GOALS   Diet: NPO  Nutrition Support:   12/8-present: Osmolite 1.5 Adan (or equivalent) @ goal of 45ml/hr (1080ml/day) + 1 pkt Prosource TF20 provides: 1700 kcals (30 kcal/kg), 87 g PRO (1.5 g/kg), 822 ml free H20, 219 g CHO, and 0 g fiber daily.   ______________________________________________________________________________________________   MODULARS/Rx/MICRONUTRIENTS:   12/8-present: certavite   12/10-11: 3 pkt Banatrol TF provides 135 kcal, 6 g protein, 15 g fiber daily (ordered by MD)   12/12-present: 4 pkt Banatrol TF provides 180 kcal, 8 g protein, 20 g fiber daily (ordered by MD)    Intake: Pt received an average of 646 ml TF/d x 6 days + an average of 1 pkt Prosource TF20 daily. This provided an average of 1049 kcal/d (18 kcal/kg) and 61 g protein/d (1.1 g/kg). This met 73% estimated energy needs and 72% estimated protein needs.      NEW FINDINGS   Overall:   12/6 Code blue RRT, intubated and sedated  12/7 PICC placement, bronchoscopy, bronchialveolar lavage  12/6-12/8 NPO, 12/8 TF initiation  12/13 Planned tracheostomy, TF held 0000 and to resume post-operation    GI:   Last BM today, fairly regular BM with 7x noted 12/12  Multiple green,  loose  Bowel regimen of Ducolax, Miralax PRN, Banatrol 4x pkts/day  Experiencing nausea, improving/controlled with Compazine/Zofran.  C. Diff positive    Skin: No noted wounds at this time.    Labs: Reviewed   Na, K+, Phos WNL  Mg 3.1 - H  BG  - H    Medications: Reviewed   Norepinephrine 0.03 mcg/kg/min (currently held - utilized overnight for hypotension)    Weights:   Vitals:    11/28/23 1102 11/29/23 0400 11/30/23 0600 12/02/23 0400   Weight: 70.8 kg (156 lb 1.4 oz) 71.8 kg (158 lb 4.6 oz) 72.8 kg (160 lb 7.9 oz) 69.2 kg (152 lb 8.9 oz)    12/03/23 0549 12/03/23 1809 12/04/23 0820 12/05/23 0630   Weight: 70.2 kg (154 lb 12.8 oz) 71.8 kg (158 lb 3.2 oz) 71.4 kg (157 lb 8 oz) 71.5 kg (157 lb 9.6 oz)    12/06/23 0600 12/07/23 0000 12/09/23 0500 12/10/23 0500   Weight: 72.5 kg (159 lb 12.8 oz) 72.9 kg (160 lb 11.5 oz) 72.5 kg (159 lb 13.3 oz) 70.1 kg (154 lb 8.7 oz)    12/11/23 0500 12/12/23 0300 12/13/23 0400   Weight: 72.4 kg (159 lb 9.8 oz) 73.2 kg (161 lb 6 oz) 72.5 kg (159 lb 13.3 oz)     Weight trends appear stable during admission.  +1-2 BLE edema noted per flowsheets.    MALNUTRITION  % Intake: < 75% for > 7 days (moderate)  % Weight Loss: Weight loss does not meet criteria  Subcutaneous Fat Loss: Facial region:  Moderate, Upper arm:  moderate, and Lower arm:  mild  Muscle Loss: Temporal:  moderate, Facial & jaw region:  mild, Upper arm (bicep, tricep):  moderate, Lower arm  (forearm):  mild, Dorsal hand:  moderate, and Posterior calf:  moderate  Fluid Accumulation/Edema: Moderate  Malnutrition Diagnosis: Severe malnutrition in the context of acute illness    Previous Goals   Patient to consume % of nutritionally adequate meal trays TID, or the equivalent with supplements/snacks.   Evaluation: Not met, changed    Previous Nutrition Diagnosis  Inadequate oral intake related to loose stools, decreased appetite, and food choices available on diet order as evidenced by pt consuming 0% to bites at  times.     Evaluation: No longer applicable, nutrition diagnosis changed below    CURRENT NUTRITION DIAGNOSIS  Inadequate oral intake related to intubation as evidenced by prolonged NPO status and initiation of TF for caloric needs.      INTERVENTIONS  Implementation  Enteral Nutrition - Modify flushes by decreasing, continue regimen  Medical Supplement Therapy of Banatrol    Goals  Total avg nutritional intake to meet a minimum of 25 kcal/kg and 1.5 g PRO/kg daily (per dosing wt 57 kg adjusted weight based on lowest weight since admission).    Monitoring/Evaluation  Progress toward goals will be monitored and evaluated per protocol.    Zenia Huerta  Dietetic Intern

## 2023-12-13 NOTE — ANESTHESIA PREPROCEDURE EVALUATION
Anesthesia Pre-Procedure Evaluation    Patient: Priya Funez   MRN: [id number] : 1940        Procedure : Procedure(s):  tracheotomy placement  microlaryngoscopy, flexible bronchoscopy          Priya Funez is a 83 year old female with a PMH of HFpEF, severe TR, permanent AF s/p AVN ablation with PPM implant 19 s/p extraction TV (transvenous) PPM and implant leadless PPM 23, emergent aortic dissection repair 19, HTN, CKD, NEDA, and obesity who presented as an outpatient for elective TVR. Patient is now s/p mini TVR on 2023 with Dr. Mabry. Transferred to floor . RRT called  for worsening hypoxia and resp distress; subsequently had respiratory arrest with short CPR prior to ROSC. Intubated and transferred back to ICU.   Now going to undergo revision tracheostomy.    Past Medical History:   Diagnosis Date     Benign essential hypertension      Cardiac pacemaker in situ     Medtronic     Chronic atrial fibrillation (H)     s/p AV node ablation and PPM placement      Chronic diastolic heart failure (H)      Chronic kidney disease, stage III (moderate) (H)      Chronic right-sided heart failure (H)      NEDA (generalized anxiety disorder)      History of aortic dissection     s/p emergent repair      History of blood transfusion     no adverse reactions     Severe tricuspid regurgitation       Past Surgical History:   Procedure Laterality Date     ANGIOGRAM Right 2019    Procedure: DIAGONSTIC ANGIOGRAM OF SMA;  Surgeon: Rigo Jennings MD;  Location:  OR     BIOPSY BREAST       BYPASS GRAFT ARTERY CORONARY, REPAIR VALVE AORTIC, COMBINED N/A 2019    Procedure: AORTIC DISSECTION REPAIR WITH GRAFT- HEMASHIELD PLATINUM WOVEN DOUBLE VELOR 4 SIDE ARM VASCULAR GRAFT, D:35 X 12 X 10 X 10MM/ L:50CM;  Surgeon: Jose Winslow MD;  Location:  OR     CV CORONARY ANGIOGRAM N/A 2023    Procedure: Coronary Angiogram;  Surgeon: Carmen  Ar Barcenas MD;  Location:  HEART CARDIAC CATH LAB     CV PCI N/A 08/29/2023    Procedure: Percutaneous Coronary Intervention;  Surgeon: Ar Morales MD;  Location:  HEART CARDIAC CATH LAB     CV RIGHT HEART CATH MEASUREMENTS RECORDED N/A 08/29/2023    Procedure: Right Heart Catheterization;  Surgeon: Ar Morales MD;  Location:  HEART CARDIAC CATH LAB     EP ABLATION AV NODE N/A 01/11/2019    Procedure: EP Ablation AV Node;  Surgeon: Tsering Davey MD;  Location:  HEART CARDIAC CATH LAB     EP LEAD EXTRACTION  07/31/2023     EP PACEMAKER Left 01/11/2019    Procedure: EP Pacemaker;  Surgeon: Tsering Davey MD;  Location:  HEART CARDIAC CATH LAB     EP PACEMAKER LEADLESS DEVICE IMPLANT  07/31/2023     EP PACEMAKER LEADLESS DEVICE IMPLANT N/A 07/31/2023    Procedure: Pacemaker Leadless Device Implant;  Surgeon: Gaston Car MD;  Location: [facility] OR     PICC INSERTION - TRIPLE LUMEN Right 12/07/2023    right basilic 5 fr tl power picc 40 cm     REPAIR PATENT FORAMEN OVALE N/A 11/28/2023    Procedure: patent foramen ovale closure;  Surgeon: Joyce Mabry MD;  Location: [facility] OR     REPAIR VALVE TRICUSPID MINIMALLY INVASIVE N/A 11/28/2023    Procedure: Right thoracotomy on cardiopulmonary bypass. Minimally Invasive Tricuspid Valve replacement using a 31mm Abbott Epic Plus Mitral Valve.  Intraoperative Transesophageal Echocardiogram (ALEX) per Anesthesia.;  Surgeon: Joyce Mabry MD;  Location: [facility] OR     THYROID SURGERY      benign mass removed     TRACHEOSTOMY N/A 01/25/2019    Procedure: TRACHEOSTOMY  (DR MCCLELLAND);  Surgeon: Bryson Mcclelland MD;  Location:  OR     TRANSESOPHAGEAL ECHOCARDIOGRAM INTRAOPERATIVE N/A 6/16/2023    Procedure: ECHOCARDIOGRAM,TRANSESOPHAGEAL,WITH ANESTHESIA;  Surgeon: Dennis Meier DO;  Location: [facility] OR     TRANSESOPHAGEAL ECHOCARDIOGRAM INTRAOPERATIVE N/A 09/08/2023    Procedure: ECHOCARDIOGRAM,TRANSESOPHAGEAL,WITH  ANESTHESIA;  Surgeon: Rios Heredia MD;  Location: [facility] OR     TUBAL LIGATION        Allergies   Allergen Reactions     Bactrim [Sulfamethoxazole-Trimethoprim]      pancytopenia     Amoxicillin Swelling     Face and body     Ciprofloxacin Hives      Social History     Tobacco Use     Smoking status: Never     Smokeless tobacco: Never   Substance Use Topics     Alcohol use: Never      Wt Readings from Last 1 Encounters:   12/13/23 72.5 kg (159 lb 13.3 oz)        Anesthesia Evaluation   Pt has had prior anesthetic.     No history of anesthetic complications       ROS/MED HX  ENT/Pulmonary: Comment: H/o trach in 2019   (-) tobacco use   Neurologic:  - neg neurologic ROS  (-) no seizures and no CVA   Cardiovascular: Comment: S/p surgery for aortic dissection in 2019    (+)  hypertension- -   -  - -   Taking blood thinners   CHF  Last EF: 55-60 date: 9/2023     pacemaker,  type: Medtronic, settings: VVIR ,       dysrhythmias, a-fib,  valvular problems/murmurs  severe TR.    Previous cardiac testing   Echo: Date: 9/2023 Results:  Interpretation Summary     Left ventricular size, wall motion and function are normal. The ejection  fraction is 55-60%.  The right ventricle is mildly dilated with normal systolic function.  There is malcoaptation of the tricuspid valve, primarily driven by the septal  leaflet resulting in very severe valvular regurgitation.  Compared to the prior study of 6/16/23 the right ventricular transvenous  pacemaker has been extracted. Severe tricuspid regurgitation persists.    Stress Test:  Date: Results:    ECG Reviewed:  Date: 8/23/23 Results:  Atrial fibrillation, V paced   Cath:  Date: 8/29/23 Results:  1. Trivial nonobstructive CAD.  Proximal LAD is mildly ectatic.  2. Mild pulmonary hypertension with high-normal left-sided filling pressures and moderately elevated right-sided filling pressures.    3. Large V waves on RA tracing suggestive of severe TR  4. Moderate-severely  reduced cardiac index by Rajani.      METS/Exercise Tolerance:  Comment: Walking with walker- walking around stores. Can go up and down stairs. ALEXANDER   Hematologic:     (+)       history of blood transfusion, no previous transfusion reaction,     (-) history of blood clots   Musculoskeletal:  - neg musculoskeletal ROS     GI/Hepatic:  - neg GI/hepatic ROS  (-) GERD   Renal/Genitourinary:     (+) renal disease, type: CRI, Pt does not require dialysis,           Endo:    (-) chronic steroid usage   Psychiatric/Substance Use:     (+) psychiatric history anxiety       Infectious Disease:  - neg infectious disease ROS     Malignancy:  - neg malignancy ROS     Other:            Physical Exam    Airway   unable to assess      TM distance: > 3 FB   Neck ROM: full   Mouth opening: > 3 cm    Respiratory Devices and Support    ETT:      Dental    unable to assess        Cardiovascular          Rhythm and rate: tachycardia   (+) peripheral edema and weak pulses       Pulmonary           (+) rhonchi and decreased breath sounds         OUTSIDE LABS:  CBC:   Lab Results   Component Value Date    WBC 16.9 (H) 12/13/2023    WBC 11.9 (H) 12/12/2023    HGB 8.7 (L) 12/13/2023    HGB 8.9 (L) 12/12/2023    HCT 28.1 (L) 12/13/2023    HCT 28.7 (L) 12/12/2023     12/13/2023     12/12/2023     BMP:   Lab Results   Component Value Date     12/13/2023     (H) 12/12/2023    POTASSIUM 4.2 12/13/2023    POTASSIUM 4.3 12/12/2023    CHLORIDE 106 12/13/2023    CHLORIDE 108 (H) 12/12/2023    CO2 29 12/13/2023    CO2 29 12/12/2023    BUN 50.0 (H) 12/13/2023    BUN 52.5 (H) 12/12/2023    CR 1.01 (H) 12/13/2023    CR 0.99 (H) 12/12/2023     (H) 12/13/2023     (H) 12/13/2023     COAGS:   Lab Results   Component Value Date    PTT 36 12/06/2023    INR 1.10 12/13/2023    FIBR 432 12/06/2023     POC:   Lab Results   Component Value Date     (H) 01/29/2019     HEPATIC:   Lab Results   Component Value Date    ALBUMIN  3.1 (L) 12/13/2023    PROTTOTAL 5.3 (L) 12/13/2023    ALT 50 12/13/2023    AST 50 (H) 12/13/2023    GGT 30 08/30/2023    ALKPHOS 95 12/13/2023    BILITOTAL 0.4 12/13/2023     OTHER:   Lab Results   Component Value Date    PH 7.44 12/13/2023    LACT 1.2 12/13/2023    A1C 5.7 (H) 11/07/2023    BESS 8.8 12/13/2023    PHOS 3.4 12/13/2023    MAG 2.8 (H) 12/13/2023    LIPASE 145 01/04/2019       Anesthesia Plan    ASA Status:  3    NPO Status:  NPO Appropriate    Anesthesia Type: General.     - Airway: Tracheostomy   Induction: Inhalation.   Maintenance: TIVA.        Consents    Anesthesia Plan(s) and associated risks, benefits, and realistic alternatives discussed. Questions answered and patient/representative(s) expressed understanding.     - Discussed: Risks, Benefits and Alternatives for BOTH SEDATION and the PROCEDURE were discussed     - Discussed with:  Healthcare Power of , Other (See Comment)      - Specific Concerns: daughter.     - Extended Intubation/Ventilatory Support Discussed: No.      - Patient is DNR/DNI Status: No     Use of blood products discussed: No .     Postoperative Care    Pain management: IV analgesics.   PONV prophylaxis: Ondansetron (or other 5HT-3), Dexamethasone or Solumedrol     Comments:           H&P reviewed: Unable to attach H&P to encounter due to EHR limitations. H&P Update: appropriate H&P reviewed, patient examined. No interval changes since H&P (within 30 days).        Ezio Nguyen MD    I have reviewed the pertinent notes and labs in the chart from the past 30 days and (re)examined the patient.  Any updates or changes from those notes are reflected in this note.

## 2023-12-13 NOTE — ANESTHESIA CARE TRANSFER NOTE
Patient: Priya Funez    Procedure: Procedure(s):  tracheotomy placement  flexible bronchoscopy       Diagnosis: Acute respiratory failure with hypoxia (H) [J96.01]  Diagnosis Additional Information: No value filed.    Anesthesia Type:   General     Note:    Oropharynx: oropharynx clear of all foreign objects  Level of Consciousness: awake  Patient oxygen source: assist.    Independent Airway: airway patency satisfactory and stable  Dentition: dentition unchanged  Vital Signs Stable: post-procedure vital signs reviewed and stable  Report to RN Given: handoff report given  Patient transferred to: ICU  Comments: Pt remains stable, monitors on alarms in place, report to PACU RN, no complications  ICU Handoff: Call for PAUSE to initiate/utilize ICU HANDOFF, Identified Patient, Identified Responsible Provider, Reviewed the Pertinent Medical History, Discussed Surgical Course, Reviewed Intra-OP Anesthesia Management and Issues during Anesthesia, Set Expectations for Post Procedure Period and Allowed Opportunity for Questions and Acknowledgement of Understanding  Vitals:  Vitals Value Taken Time   BP     Temp     Pulse 60 12/13/23 1636   Resp 17 12/13/23 1636   SpO2 100 % 12/13/23 1636   Vitals shown include unfiled device data.    Electronically Signed By: TAMRA Glover CRNA  December 13, 2023  4:36 PM

## 2023-12-14 NOTE — PROGRESS NOTES
Care Management Follow Up    Length of Stay (days): 16    Expected Discharge Date:  TBD     Concerns to be Addressed: all concerns addressed in this encounter     Patient plan of care discussed at interdisciplinary rounds: Yes    Anticipated Discharge Disposition:  LTACH     Anticipated Discharge Services:  NA  Anticipated Discharge DME:  NA    Patient/family educated on Medicare website which has current facility and service quality ratings:  NA  Education Provided on the Discharge Plan:  NA  Patient/Family in Agreement with the Plan:  Yes    Referrals Placed by CM/SW:  VON  Private pay costs discussed: Not applicable    Additional Information:  [facility] following for placement after hospital stay. Pt not medically ready today per provider. [facility] liaison updated. They will continue to follow. Care management will continue to follow.     María George RN   Nurse Coordinator    Covering for: 4   Phone: *[phone]    Social Work and Care Management Department       SEARCHABLE in [facility] - search CARE COORDINATOR       [place] ([phone]) Saturday & Sunday; ([phone]) [facility] Recognized Holidays     Units: 5A, 5B & 5C  Pager: [phone]    Units: 6B, 6C & 6D    Pager: [phone]    Units: 7A, 7B, 7C & 7D    Pager: [phone]    Units: 6A & ICU   Pager: [phone]    Units: 5 Ortho, 5MS & WB ED Pager: [phone]    Units: 6MS, 8A & 10 ICU  Pager [phone]

## 2023-12-14 NOTE — PROGRESS NOTES
Transferred to: [address] [place] [address]  Status at time of transfer: stable  Belongings: sent with pt  Barrientos removed? (if no, why?): NA  Chart and medications: sent with pt  Family notified:

## 2023-12-14 NOTE — PLAN OF CARE
ICU End of Shift Summary. See flowsheets for vital signs and detailed assessment.    Changes this shift: Pt A/O x4, up SBA/A1, denies pain. 100% Vpaced, goal to maintain SBP >100, no need for pressors. PS to TD, tolerating TD, using PMV with supervision. Up to bedside commode, 1x large BM, rectal tube removed, TF up to goal, FWF decreased. Diuresed with lasix, one large unmeasured occurrence.      Plan: maintain SBP >100, update team with changes    Goal Outcome Evaluation:      Plan of Care Reviewed With: patient    Overall Patient Progress: improvingOverall Patient Progress: improving    Outcome Evaluation: PS to TD, used PMV, TF at goal

## 2023-12-14 NOTE — PROGRESS NOTES
CV ICU PROGRESS NOTE  12/14/2023          ASSESSMENT: Priya Funez is a 83 year old female with a PMH of HFpEF, severe TR, permanent AF s/p AVN ablation with PPM implant 1/11/19 s/p extraction TV (transvenous) PPM and implant leadless PPM 7/31/23, emergent aortic dissection repair 1/4/19, HTN, CKD, NEDA, and obesity who presented as an outpatient for elective TVR. Patient is now s/p mini TVR on 11/28/2023 with Dr. Mabry. Transferred to floor 12/2. RRT called 12/6 for worsening hypoxia and resp distress; subsequently had respiratory arrest with short CPR prior to ROSC. Intubated and transferred back to ICU.  Now s/p tracheostomy 12/13.     CO-MORBIDITIES:   S/P TVR (tricuspid valve replacement)  (primary encounter diagnosis)  Complete heart block (H)  Displacement of atrial pacemaker leads, initial encounter    Today's Progress:  - Systolic goals > 100   - Started midodrine 10 mg Q8H   - Lasix 40 mg IV once   - Trach dome trials with RT  - Speaking valve with SLP  - Start PRN imodium QID     PLAN:  Neuro/ pain/ sedation:  - Monitor neurological status. Notify the MD for any acute changes in exam.  #Acute postoperative pain  - PRN: Tylenol, oxycodone  #Sedation  - Scheduled Melatonin HS   #Hx of general anxiety disorder  - PTA Lexapro and Remeron     Pulmonary care:   # s/p tracheostomy 12/13/23  # s/p Mechanical ventilation  #Acute respiratory failure and arrest on 12/6/23   #Acute hypercapnic and hypoxic respiratory failure requiring BiPAP  #Aspiration pneumonitis vs pneumonia  #Possible aspiration event 12/1  #Near complete plugging of left lower lobe on CT   #Acute mildly displaced and nondisplaced right 3-5 rib fractures with associated right chest wall hematoma and nondisplaced anterolateral left fourth and fifth rib fractures  - RRT called 12/6 for worsening hypoxia and resp distress; subsequently had respiratory arrest with short CPR prior to ROSC. Intubated and transferred back to ICU.   - Trach on  minimal PS Vent settings  Vent Mode: CPAP/PS  (Continuous positive airway pressure with Pressure Support)  FiO2 (%): 40 %  PEEP (cm H2O): 5 cmH2O  Pressure Support (cm H2O): 7 cmH2O  Resp: 16  - Mucomyst, duoneb w/ RT Q4H   - Bronch 12/7 (+) Candida albicans  - Trach mask trials post-op   - RT ventilation wean  - SLP speaking valve, hold off on swallow evaluation per ENT recs  - LTAC preference is [facility] per family   - VBG PRN for CO2 retention concern   - CXR with similar perihilar and bibasilar opacities, favors atelectasis/edema with right-sided pleural effusion.      Cardiovascular:    # S/p respiratory arrest 12/6  # Hx of severe TR s/p TVR  # HFpEF  # Hx of Afib s/p AVN ablation - PPM, upgraded to leadless 7/2023  # Hypertension  # Hyperlipidemia  # Hx of aortic dissection repair 2019  # Respiratory arrest 12/6   # Elevated BNP, improving   Stable paced rhythm with underlying atrial flutter. On review of cardiology, underlying aflutter/afib has been present since at least June of this year.   Pre-op 9/8/23 echo: LVEF 55/60%, mildly dilated RV, normal function  Echo 12/4: LVEF 60-65%, normal RV function, TV mean gradient 5 mmHg, no TR  - Echo 12/7 with no significant change from previous with the exception of mild reduction of RV. RV free wall dyskinesia   - Device RN adjusted PPM to PTA settings rate 60   - ASA 81 mg daily  - Hold PTA spironolactone 25 mg daily  - Discontinue the held Metoprolol 12.5 mg BID (PTA 25 mg daily)    - Discontinue norepinephrine  - Warfarin held. Previously supra therapeutic. Per CVTS no heparin bridge needed at this time. Anticoagulated for underlying afib/flutter. Per ENT, could start warfarin tomorrow.      GI care / Nutrition:   # Multiple loose stools   # Hx of C. Difficile (9/2023)  # Nausea, improved    - NJT and enteral feeds at goal (45 mL/hr)   - Prior to reintubation SLP recommended regular diet with thin liquids. Patient with history of dysphagia, possible aspiration  event on 12/1.   - SLP for speaking valve today, hold off on swallow eval per ENT   - PPI famotidine  - Bowel regimen discontinued due to continued loose stools  - Finished vancomycin po 7 day course   - Continue Banatrol  - PRN Zofran  4 mg PO or IV Q6H  - PRN Compazine 5 mg IV Q6H   - Start PRN imodium QID     Renal / Fluids / Electrolytes:   #Hypernatremia, resolved   #Hypokalemia, resolved   #CKD stage 3  #Lactic acidosis, resolved  #IMAN on CKD, resolved   BL creat ~1.2-1.3, most recent creatinine 1.03  FLUID STATUS: Pre-op weight 70.9 kg, current weight 75 kg (72.5 kg yesterday)   - HOLD PTA spironolactone 25 mg daily  - HOLD PTA torsemide   - Elevated sodium, likely 2/2 aggressive diureses S/p bumex and chlorotiazide. Will decrease free water flushes to @ 30 mL Q4Hr. Na 145 today.   - I/O inaccurate due to unmeasured stools and voids   - Lasix 40 mg IV once  - Replete lytes per protocol  - Avoid/limit nephrotoxins as able  - Strict I/O, daily weights, avoid/limit nephrotoxins     Endocrine:    #Stress induced hyperglycemia  #T2DM  Hgb A1c 5.7%  - Medium Sliding scale insulin   - PTA Jardiance on hold, resume once medically optimized   - Goal BG <180 for optimal healing     ID / Antibiotics:  #Stress induced leukocytosis  # Probable aspiration pneumonitis vs pneumonia  - 11/30 Zosyn 7-day course for pneumonia finished   - 12/6 Given further decompensation, Vanco and meropenem was started on 12/6   - Finish meropenem 7 day course (12/12)   - Finishing Vancomycin PO 7 day course for c-diff today (12/13)  - Follow up BC x 2, UA and BAL  - Blood culture with NGTD   - Urine with Candida and sputum with Yeast, likely both colonizations.   - Urine culture with NGTD  -Repeat cultures 12/10   - Blood cultures with NGTD  - Sputum with Yeast  - Afebrile, WBC increased today, will continue to monitor for now  #Hx of recent C. Difficile  - C. Difficile toxin PCR positive 12/3 with negative antigen and negative toxin - per  antimicrobial stewardship team, more likely to be colonization with recent history and no apparent treatment  - Continue oral vancomycin course to finish 12/13  - Monitor fever curve, WBC, and inflammatory markers as appropriate  - Continue enteric precautions per infection prevention protocol      Heme:     # Acute blood loss anemia  No s/sx active bleeding  - CBC   - Hgb goal > 7.0     MSK / Skin:  #Mini-thoracotomy  #Rib fractures  #Surgical incision  #Perineal irritation   - Mini-thoracotomy precautions  - Incisional cares per protocol  - Postop incision management protocol  - WOC consult for perineal irritation, frequent loose stools and voids    - PT/OT/CR    Prophylaxis:    - VTE: SCD's, SQH   - Bowel regimen: Famotidine    Lines/ tubes/ drains:  - Tracheostomy (12/13)  - PICC line   - Right radial arterial Line   - NJT    Disposition:  - CVICU    ICU Checklist  F - Feeding: NJ - TF   A - Analgesia: prns   S - Sedation: off   T - Thromboembolic prophylaxis: SQH     H - Head of bed elevated: yes   U - Ulcer prophylaxis: famotidine  G - Glycemic control: sliding scale insulin   S - SBT-tolerating PS & trach dome     B - Bowel regimen- currently on hold, loose stools   I - Indwelling catheter- none, external   D - De-escalation of antibiotics: finished meropenem 12/12, finished vancomycin 12/13    Patient seen, findings and plan discussed with CVICU staff and cardiothoracic surgeons.    Quin Langford DO   Anesthesiology Resident   PGY2/CA1    ICU *[id number]    ====================================    TODAY'S PROGRESS  SUBJECTIVE  Patient wants to ambulate. Endorses increased leg swelling. Nausea and pain well controlled.     OBJECTIVE  1. VITAL SIGNS  Temp:  [97.5  F (36.4  C)-98.8  F (37.1  C)] 97.6  F (36.4  C)  Pulse:  [60-92] 60  Resp:  [11-26] 16  MAP:  [54 mmHg-128 mmHg] 69 mmHg  Arterial Line BP: ()/() 121/44  FiO2 (%):  [40 %] 40 %  SpO2:  [91 %-100 %] 100 %  Vent Mode: CPAP/PS  (Continuous  positive airway pressure with Pressure Support)  FiO2 (%): 40 %  PEEP (cm H2O): 5 cmH2O  Pressure Support (cm H2O): 7 cmH2O  Resp: 16      2. INTAKE/ OUTPUT  I/O last 3 completed shifts:  In: 1777.73 [I.V.:582.73; NG/GT:605; IV Piggyback:500]  Out: 800 [Urine:600; Stool:200]    3. PHYSICAL EXAMINATION   General: orally intubated, follows commands on all extremities alert and oriented, writes on paper to communicate  Neuro: A&O, follows commands, answers questions by writing on paper  Resp: breath sounds clear bilaterally, trach on PS   CV: V-paced around 60, extremities well perfused and DP intact.   Abdomen: Soft, non-distended, non-tender  Incisions: thoracotomy incision is cdi   Extremities: warm and well perfused. 1+ pitting edema BLE.     4. INVESTIGATIONS  Arterial Blood Gases   Recent Labs   Lab 12/13/23 1721 12/13/23 0405 12/12/23 0452 12/11/23 2014   PH 7.44 7.44 7.49* 7.52*   PCO2 46* 48* 42 38   PO2 97 112* 91 99   HCO3 31* 32* 32* 31*     Complete Blood Count   Recent Labs   Lab 12/14/23 0320 12/13/23 1722 12/13/23 0406 12/12/23  0450   WBC 12.3* 10.7 16.9* 11.9*   HGB 8.6* 8.9* 8.7* 8.9*    272 367 254     Basic Metabolic Panel  Recent Labs   Lab 12/14/23 0324 12/14/23  0320 12/14/23  0011 12/13/23 2051 12/13/23 1722 12/13/23  0812 12/13/23  0406 12/12/23 2051 12/12/23  1539   NA  --  145  --   --  145  --  145  --  148*   POTASSIUM  --  4.3  --   --  4.6  --  4.2  --  4.3   CHLORIDE  --  107  --   --  109*  --  106  --  108*   CO2  --  29  --   --  28  --  29  --  29   BUN  --  43.0*  --   --  44.9*  --  50.0*  --  52.5*   CR  --  0.88  --   --  0.89  --  1.01*  --  0.99*   * 112* 115* 122* 128*   < > 117*   < > 102*    < > = values in this interval not displayed.     Liver Function Tests  Recent Labs   Lab 12/14/23  0320 12/13/23  0406 12/12/23  0450 12/11/23  0419   AST 31 50* 48* 40   ALT 39 50 47 34   ALKPHOS 98 95 95 79   BILITOTAL 0.4 0.4 0.3 0.3   ALBUMIN 3.3* 3.1* 3.2*  "3.0*   INR 1.07 1.10 1.15 1.20*     Pancreatic Enzymes  No lab results found in last 7 days.  Coagulation Profile  Recent Labs   Lab 12/14/23  0320 12/13/23  0406 12/12/23  0450 12/11/23  0419   INR 1.07 1.10 1.15 1.20*     Lactate  Invalid input(s): \"LACTATE\"    5. RADIOLOGY  Recent Results (from the past 24 hour(s))   XR Chest Port 1 View    Narrative    Portable chest    INDICATION: Post tracheostomy placement    COMPARISON: Yesterday 1400 hours    FINDINGS: Image at 1642 hours today shows upper normal size of the  heart. Switch to previous endotracheal tube for new tracheostomy  apparatus. Median sternotomy again noted. Feeding tube beyond the  inferior margin of the image. Right extremity PICC line tip now in the  right atrium. Leadless pacemaker.  Patchy opacities in the lungs appear increased in the right lung base  with meniscus formation at the expected costophrenic angle region  which may represent developing right pleural effusion.      Impression    IMPRESSION: Possible developing right pleural effusion with other  atelectasis or edema in the lungs. Switch to previous endotracheal  tube 4 current tracheostomy which appears grossly appropriately  positioned.    NASEEM RUEDA MD         SYSTEM ID:  [id number]          "

## 2023-12-14 NOTE — INTERIM SUMMARY
Patient with soft Bps and low MAP while sleeping. Reordered NE gtt for PICC, to maintain MAPs greater than 65.    Easton Ennis MD

## 2023-12-14 NOTE — PROGRESS NOTES
Admitted/transferred from: 4A   Reason for admission/transfer: Lateral  Patient status upon admission/transfer: on vents ps 7/5, no drips. Stable condition  Interventions: skin assesment, labs   Plan: trache dome in am   2 RN skin assessment: completed by tonja hagan   Sexual Orientation and Gender Identity (SOGI) smartfom completed: Donee  Result of skin assessment and interventions/actions:  Height, weight, drug calc weight: Done  Patient belongings (see Flowsheet - Adult Profile for details): cart with clothes phone wallet, tablet.  MDRO education (if applicable):  na    ICU End of Shift Summary. See flowsheets for vital signs and detailed assessment.    Changes this shift:     Following commands alert and oriented x4. On pressure support 7/5, sats >95. On levo intermittently currently off tolerating map goal of 60s, 100% Paced 60s. Feeds running @30 will be at goal noon 45 noon.    Plan:    Trache dome, continue plan of care

## 2023-12-14 NOTE — PROGRESS NOTES
12/14/23 1442   Appointment Info   Signing Clinician's Name / Credentials (SLP) Ana Rivera MS CCC-SLP   General Information   Onset of Illness/Injury or Date of Surgery 11/28/23   Referring Physician Saturnino Manuel APRN CNP   Patient/Family Therapy Goal Statement (SLP) None stated   Pertinent History of Current Problem Priya Funez is a 83 year old female with a PMH of HFpEF, severe TR, permanent AF s/p AVN ablation with PPM implant 1/11/19 s/p extraction TV (transvenous) PPM and implant leadless PPM 7/31/23, emergent aortic dissection repair 1/4/19, HTN, CKD, NEDA, and obesity who presented as an outpatient for elective TVR. Patient is now s/p mini TVR on 11/28/2023 with Dr. Mabry. Transferred to floor 12/2. RRT called 12/6 for worsening hypoxia and resp distress; subsequently had respiratory arrest with short CPR prior to ROSC. Intubated and transferred back to ICU.  Trach placed 12/13. CVTS OK'd PMSV use. Communication/PMSV evaluation completed per MD orders.   Type of Evaluation   Type of Evaluation Artificial Airway (Speaking Valve)   Tracheostomy Assessment (Speaking Valve)   Cuff, Tracheostomy Tube cuffed, deflated   Date of Tracheostomy 12/13/23   Tube Size, Tracheostomy 6   Participation Ability (Speaking Valve) awake/alert;attempts to communicate, mouthing words;follows simple commands   Type, Tracheostomy Tube Flip   Respiratory Status (Speaking Valve)   Oxygen Supply Trach Dome  (35LPM with 40% FiO2)   Oral/Tracheal Secretions (Speaking Valve)   Oral Secretions (Speaking Valve Assessment) minimal secretions   Tracheal Secretions (Speaking Valve Assessment) minimal secretions   Speaking Valve Trials (Speaking Valve)   Outcome of Trial (Speaking Valve) tolerance is good   Voice Production (Speaking Valve Trial) fair strength/quality   Breath Support (Speaking Valve Trial) exhales through mouth;coordinates speech with breath support   Set-up/Cuff Deflation (Speaking Valve Trial) cuff  deflated, total   Recommendations (Speaking Valve Trials) speaking valve use recommended;maintain speaking valve warning labels on cuff inflation line   Cough Production (Speaking Valve Trial) fair   Secretions/Suction, Cuff Deflation (Speaking Valve)   (pt did not require suctioning)   Secretions During Valve Use (Speaking Valve Trial) secretions managed well during valve use   Airflow/Phonation Air flow around trach adequate with finger occlusion;Able to phonate with occlusion of trach   Speaking Valve placed on tracheostomy tube   Cuff Inflated at Onset of Evaluation Yes  (partially)   Orders received to deflate cuff for PMSV trial Yes   Oxygen saturation before cuff deflation 99 %   Oxygen saturation after cuff deflation 99 %   Oxygen saturation with PMSV placement 99 %   Total amount of time with PMSV placement: 35 minutes   Respiratory rate before cuff deflation 24 Per Minute   Respiratory rate after cuff deflation 23 Per Minute   Respiratory Rate with PMSV placement 24 Per Minute   General Therapy Interventions   Planned Therapy Interventions Communication   Communication Speaking valve instruction;Improve speech intelligibility   Clinical Impression   Criteria for Skilled Therapeutic Interventions Met (SLP Eval) Yes, treatment indicated   SLP Diagnosis aphonia d/t tracheostomy   Risks & Benefits of therapy have been explained evaluation/treatment results reviewed;care plan/treatment goals reviewed;risks/benefits reviewed;current/potential barriers reviewed;participants voiced agreement with care plan;participants included;patient   Clinical Impression Comments Communication/PMSV evaluation completed per MD orders. Pt with Shiley 6 cuffed trach with cuff partially deflated at onset of session. Pt requiring 35LPM with 40% FiO2 via HFTD. Pt's O2 sats 99% at baseline. Pt tolerated full cuff deflation without difficulty and did not require any suctioning. Pt was able to achieve adequate voicing during finger  occlusion trials so PMSV placed by SLP. Pt tolerated PMSV for 35 minutes with VSS and harsh but functional voicing. Training provided re: donning/doffing. After training, pt was able to don/doff PMSV with use of mirror but still require moderate cues and assistance. PMSV left in place at end of session as pt was supervised by family and nursing. Recommend pt continue trach dome with cuff deflated as tolerated. OK for PMSV use as tolerated with 1:1 supervision. Pt must be on trach dome with cuff deflated. Please remove PMSV if pt's SOB, fatigued, or sleeping. ST to continue to follow. Anticipate pt will require close SLP follow-up at discharge.   SLP Discharge Recommendation home with outpatient therapy services;[facility] patient will benefit from intensive, interdisciplinary therapy.  Anticipate will be able to tolerate 3 hours of therapy per day   SLP Rationale for DC Rec pt would benefit from continued ST targeting PMSV tolerance and swallow eval   SLP Brief overview of current status  Recommend pt continue trach dome with cuff deflated as tolerated. OK for PMSV use as tolerated with 1:1 supervision. Pt must be on trach dome with cuff deflated. Please remove PMSV if pt's SOB, fatigued, or sleeping.

## 2023-12-14 NOTE — PROGRESS NOTES
"ENT PROGRESS NOTE    S/E: NAEO    O  BP (!) 69/44   Pulse 60   Temp 97.6  F (36.4  C) (Oral)   Resp 15   Ht 1.613 m (5' 3.5\")   Wt 72.5 kg (159 lb 13.3 oz)   SpO2 99%   BMI 27.87 kg/m    GENERAL - well-appearing  HEENT - trach in place w/ sutures. Straps appropriate  PULM - mech vent  NEURO - very pleasant, awake and alert    ASSESSMENT  Priya is a very pleasant 83F s/p revision tracheostomy 12/13.    PLAN  - routine trach cares  - ENT will cut sutures POD5    Alen Deleon MD  ENT resident    D/w staff  "

## 2023-12-15 PROBLEM — J96.00 ACUTE RESPIRATORY FAILURE (H): Status: ACTIVE | Noted: 2023-01-01

## 2023-12-15 NOTE — PROGRESS NOTES
Care Management Discharge Note    Discharge Date:  12/15/23       Discharge Disposition: [facility]    Discharge Services:  NA    Discharge DME:  NA    Discharge Transportation:  [facility] [place]    Private pay costs discussed: Not applicable    Does the patient's insurance plan have a 3 day qualifying hospital stay waiver?  No    PAS Confirmation Code:  NA  Patient/family educated on Medicare website which has current facility and service quality ratings:  NA    Education Provided on the Discharge Plan:  Yes  Persons Notified of Discharge Plans: Pt, family  Patient/Family in Agreement with the Plan:  Yes    Handoff Referral Completed:  NA    Additional Information:  RNCC notified by provider pt is medically ready for discharge to [facility]. Writer reached out to [facility] liaarley Richards who confirmed they can accept pt today.  [facility] transport scheduled for [phone] pickup time.     Provider notified family. Provider to provider and nurse to nurse calls to be completed. Contact numbers provided. Pt needs discharge orders completed no later than 1600. Provider notified. Family notified of new address and transport time.     [facility]  [address] [address].   [phone]     [facility]  [phone]  Pickup between [phone]    María George RN

## 2023-12-15 NOTE — PROGRESS NOTES
4:43 PM  Plan for patient to go to [facility] today at 6:15 PM.  Noted ENT typically rec patients stay in the hospital 5d after trach.   Discussed with Dr. Michelle from [facility] who is very comfortable with the trach care and monitoring. He states they can coordinate any follow-up the patient might need with ENT, as desired by ENT. Discussed with Dr. Mabry who agrees the patient can discharge to [facility].    Katie Chavira MD [facility]  Professor of Surgery  Pager [phone]

## 2023-12-15 NOTE — PROGRESS NOTES
ICU End of Shift Summary. See flowsheets for vital signs and detailed assessment.    Changes this shift:     Neuro status unchanged from previous shift. Tolerating pressure support 30% 7/5. A flutter with a 3:1 conduction recognized, EKG ordered cvts notified, bp greater than 100 systolic goal for most of the shift, brief piriod of hypotension cvts notified, midodrine given. On continuous feeds running at goal, several bm's overnight.    Plan:   Continue plan of care

## 2023-12-15 NOTE — PROGRESS NOTES
12/15/23 0929   Appointment Info   Signing Clinician's Name / Credentials (SLP) Ana Rivera MS CCC-SLP   General Information   Onset of Illness/Injury or Date of Surgery 11/28/23   Referring Physician Saturnino Manuel APRN CNP   Patient/Family Therapy Goal Statement (SLP) None stated   Pertinent History of Current Problem Priya Funez is a 83 year old female with a PMH of HFpEF, severe TR, permanent AF s/p AVN ablation with PPM implant 1/11/19 s/p extraction TV (transvenous) PPM and implant leadless PPM 7/31/23, emergent aortic dissection repair 1/4/19, HTN, CKD, NEDA, and obesity who presented as an outpatient for elective TVR. Patient is now s/p mini TVR on 11/28/2023 with Dr. Mabry. Transferred to floor 12/2. RRT called 12/6 for worsening hypoxia and resp distress; subsequently had respiratory arrest with short CPR prior to ROSC. Intubated and transferred back to ICU.  Trach placed 12/13. Clinical swallow eval completed per MD orders.   Type of Evaluation   Type of Evaluation Swallow Evaluation   Oral Motor   Oral Musculature generally intact   Structural Abnormalities none present   Mucosal Quality adequate   Dentition (Oral Motor)   Dentition (Oral Motor) adequate dentition   Facial Symmetry (Oral Motor)   Facial Symmetry (Oral Motor) WNL   Lip Function (Oral Motor)   Lip Range of Motion (Oral Motor) WNL   Tongue Function (Oral Motor)   Tongue ROM (Oral Motor) WNL   Jaw Function (Oral Motor)   Jaw Function (Oral Motor) WNL   Cough/Swallow/Gag Reflex (Oral Motor)   Volitional Throat Clear/Cough (Oral Motor) reduced strength   Vocal Quality/Secretion Management (Oral Motor)   Vocal Quality (Oral Motor) WFL;harsh  (with PMSV in place)   Secretion Management (Oral Motor)   (pt expectorating secretions from trach tube w/cues to cough)   General Swallowing Observations   Past History of Dysphagia Pt familiar to SLP caseload during admission to [facility] in 2019. At that time, pt complete VFSS which  "demonstrated penetration with thin liquids and aspiration w/cough of mildly thick liquids. A regular diet and honey thick liquids was recommended at that time. Since then, pt has not followed with SLP and self-advanced to thin liquids. Pt denied any recent trouble swallowing and reported no hx of PNA. Pt evaluated earlier this admission with recommendations for regular diet and thin liquids. SLP offerred repeat VFSS at that time d/t report hx of aspiration, however pt politely declined d/t no concerns related to swallowing. Pt now apprehensive regarding eating/drinking and wanting to \"take things slow.\"   Respiratory Support supplemental oxygen  (30% FiO2 with 30LPM via HFTD (PMSV in place))   Current Diet/Method of Nutritional Intake (General Swallowing Observations, NIS) NPO;nasogastric tube (NG)   Swallowing Evaluation Clinical swallow evaluation   Clinical Swallow Evaluation   Feeding Assistance no assistance needed   Clinical Swallow Evaluation Textures Trialed thin liquids   Clinical Swallow Eval: Thin Liquid Texture Trial   Mode of Presentation, Thin Liquids spoon;self-fed;cup   Volume of Liquid or Food Presented 4 ice chips, 2 oz H20   Oral Phase of Swallow WFL   Pharyngeal Phase of Swallow impaired;coughing/choking   Diagnostic Statement Pt tolerated ice chips with no overt s/sx of aspiration. Thin liquids via cup resulted in delayed coughing x1.   Esophageal Phase of Swallow   Patient reports or presents with symptoms of esophageal dysphagia No   Swallowing Recommendations   Diet Consistency Recommendations NPO;ice chips only   Supervision Level for Intake close supervision needed   Medication Administration Recommendations, Swallowing (SLP) recommend non-oral route for meds   Instrumental Assessment Recommendations instrumental evaluation not recommended at this time;VFSS (videofluoroscopic swallowing study)  (likely VFSS on monday)   General Therapy Interventions   Planned Therapy Interventions Dysphagia " Treatment   Dysphagia treatment   (PO readiness)   Clinical Impression   Criteria for Skilled Therapeutic Interventions Met (SLP Eval) Yes, treatment indicated   SLP Diagnosis mild oropharyngeal dysphagia   Risks & Benefits of therapy have been explained evaluation/treatment results reviewed;care plan/treatment goals reviewed;risks/benefits reviewed;current/potential barriers reviewed;participants voiced agreement with care plan;participants included;patient;daughter   Clinical Impression Comments Clinical swallow eval completed per MD orders. Pt presents with suspected mild oropharyngeal dysphagia. Pt's swallow function assessed while on trach dome with PMSV in place. Pt's vocal quality mildly harsh with mild increased WOB noted.  Mildly reduced cough strength, otherwise oral mech exam WFL. Oral cares completed. Pt tolerated ice chips with no overt s/sx of aspiration. Thin liquids via cup resulted in delayed coughing x1, otherwise no overt s/sx of aspiration. Pt reported she'd like to take things slow in regards to swallowing, so solid trials deferred. Recommend pt continue NPO with excellent oral cares. OK for sips of ice chips for comfort after oral cares. Pt would likely benefit from completion of video swallow study, however pt reported she is not quite ready for completion as she wants to take swallowing slow. ST to continue to follow. Anticipate pt will require close SLP follow-up at discharge.   SLP Discharge Recommendation [facility] patient will benefit from intensive, interdisciplinary therapy.  Anticipate will be able to tolerate 3 hours of therapy per day   SLP Rationale for DC Rec pt would benefit from continued ST targeting PMSV tolerance and dysphagia   SLP Brief overview of current status  Recommend pt continue trach dome with cuff deflated as tolerated. OK for PMSV use as tolerated with 1:1 supervision. Pt must be on trach dome with cuff deflated. Please remove PMSV if pt's SOB,  fatigued, or sleeping.     Recommend pt continue NPO with excellent oral cares. OK for sips of ice chips for comfort after oral cares. Pt would likely benefit from completion of video swallow study, however pt reported she is not quite ready for completion as she wants to take swallowing slow.   Total Session Time   Total Session Time (sum of timed and untimed services) 32

## 2023-12-15 NOTE — PHARMACY-ANTICOAGULATION SERVICE
Clinical Pharmacy - Warfarin Dosing Consult     Pharmacy has been consulted to manage this patient s warfarin therapy.  Indication: Atrial Fibrillation;Bioprosthetic Heart Valve  Therapy Goal: INR 2-3  Provider/Team: CVVISHAL MORENO Anticoag Clinic: [facility]  Warfarin Prior to Admission: Yes  Warfarin PTA Regimen: 2 mg on M, F & 3 mg ROW  Significant drug interactions: Aspirin, SQ heparin (increased risk of bleeding)  Recent documented change in oral intake/nutrition: Yes (Receiving TFs)    INR   Date Value Ref Range Status   12/15/2023 1.06 0.85 - 1.15 Final   12/14/2023 1.07 0.85 - 1.15 Final       Recommend warfarin 2 mg today.  Pharmacy will monitor Priya Funez daily and order warfarin doses to achieve specified goal.      Please contact pharmacy as soon as possible if the warfarin needs to be held for a procedure or if the warfarin goals change.      Itzel Millan PharmD  CV[facility] and Advanced Heart Failure Pharmacist  Pager [phone]

## 2023-12-15 NOTE — PROGRESS NOTES
[facility]  ([address])    Priya Funez has been accepted for admission to [facility] today.     Ride: [phone]    RN to RN report: Please call [address] at [phone] for nurse to nurse report.before the pt discharges.     Accepting MD: Dr. Torsten Andrade.  Please contact Dr. Andrade for a provider to provider report before the pt discharges.    Documentation needed prior to discharge: A visible discharge summary and discharge/readmission orders     Susie Clark CM  ACH Referral Specialist    [facility]  [address] [address]  Admissions Office: [phone]  Fax: [phone]     CONFIDENTIAL Protected under [place] Statute  145.61 et seq

## 2023-12-15 NOTE — DISCHARGE SUMMARY
[facility]    Discharge Summary  CV ICU Service    Date of Admission:  11/28/2023  Date of Discharge:  12/15/2023  Discharging Provider: Dr. Chavira  Date of Service (when I saw the patient): 12/15/23    Primary Provider: Sharmaine Burks  Primary Care clinic: [address] [facility] [address]  Phone: [phone]  Fax number: [phone]     Discharge Diagnoses   # s/p tracheostomy 12/13/23  # Acute respiratory failure and arrest on 12/6/23   # Acute hypercapnic and hypoxic respiratory failure requiring BiPAP  # Aspiration pneumonitis vs pneumonia  # Near complete plugging of left lower lobe on CT   # Acute mildly displaced and nondisplaced right 3-5 rib fractures with associated right chest wall hematoma and nondisplaced anterolateral left fourth and fifth rib fractures  # Acute postoperative pain  # Elevated BNP, improving   # Multiple loose stools   # Hypernatremia   # Hypokalemia, resolved   # Lactic acidosis, resolved  # IMAN on CKD, resolved   # Stress induced leukocytosis  # Stress induced hyperglycemia  # Acute blood loss anemia  # Perineal irritation   # Severe malnutrition in the context of acute illness  # T2DM  # CKD stage 3  # Hx of C. Difficile (9/2023)  # Hx of general anxiety disorder  # Hx of severe TR s/p TVR  # HFpEF  # Hx of Afib s/p AVN ablation - PPM, upgraded to leadless 7/2023  # Hypertension  # Hyperlipidemia  # Hx of aortic dissection repair 2019      Hospital Course   Priya Funez is a 83 year old female with a PMH of HFpEF, severe TR, permanent AF s/p AVN ablation with PPM implant 1/11/19 s/p extraction TV (transvenous) PPM and implant leadless PPM 7/31/23, emergent aortic dissection repair 1/4/19, HTN, CKD, NEDA, and obesity who presented as an outpatient for elective TVR.      HOSPITAL COURSE: Priya Funez is a 83 year old female who on 11/28/2023 underwent the above-named procedures and tolerated the operation well.       Immediately postoperatively the patient was admitted to the CVICU. Patient was extubated on POD #0 to 4 LPM.  Blood pressure and cardiac index were managed with vasopressors and inotropic agents which were continuously weaned until no longer needed. Chest tubes and temporary pacemaker wires were removed as appropriate. Patient required additional support of respiratory status including BiPAP for acute hypoxic and hypercapnic respiratory failure. She was started on Zosyn with radiologic evidence of probable pneumonia and also had an aspiration event on 12/1. Respiratory status improved, and patient was subsequently transferred to the surgical telemetry floor on 12/2/23, which she completed a 7 day course. The patient continued to progress. She continued to have dyspnea consistent with pneumonia and probable pulmonary edema. Respiratory status was supported with aggressive pulmonary hygiene and ongoing diuresis.     On 12/6/2023 she was transferred back to CVICU after RRT for acute hypoxic respiratory distress and subsequent respiratory arrest with short CPR, re-intubation, and ROSC achieved. She sustained mildly displaced and nondisplaced right 3-5 rib fractures with associated right chest wall hematoma and nondisplaced anterolateral left 4th and 5th rib fractures. She was treated with meropenem 7 day course. She also has hx of recent C diff and multiple loose stools, for which she completed a 7 day po vancomycin course (per antimicrobial stewardship team, more likely to be colonization with recent history and no apparent treatment). She remained intubated in the CVICU until undergoing tracheostomy by Dr. Pitts on 12/13/23. She has been progressing with trach dome and intermittently on PS.      The patient was fluid overloaded and treated with diuretics. She remains up from preoperative weight and ongoing diuresis was complicated by mild hypernatremia, which has been improving with free water flushes.      Patient  was transiently hyperglycemic and treated with insulin infusion then transitioned to sliding scale insulin per protocol. Blood sugars remained stable.      Neuro/pain/sedation:  #Acute postoperative pain  - PRN: Tylenol, oxycodone  #Hx of general anxiety disorder  - PTA Lexapro and Remeron  - Scheduled Melatonin HS      Pulmonary care:   # s/p tracheostomy 12/13/23  # s/p Mechanical ventilation  #Acute respiratory failure and arrest on 12/6/23   #Acute hypercapnic and hypoxic respiratory failure requiring BiPAP  #Aspiration pneumonitis vs pneumonia  #Possible aspiration event 12/1  #Near complete plugging of left lower lobe on CT   #Acute mildly displaced and nondisplaced right 3-5 rib fractures with associated right chest wall hematoma and nondisplaced anterolateral left fourth and fifth rib fractures  - Tracheostomy sutures to be removed POD#5   - Mucomyst, duoneb w/ RT Q4H   - Bronch on 12/7 (+) Candida albicans  - Trach dome as much as tolerated   - Continue therapy with RT   - SLP speaking valve as tolerated      Cardiovascular:    # S/p respiratory arrest 12/6  # Hx of severe TR s/p TVR  # HFpEF  # Hx of Afib s/p AVN ablation - PPM, upgraded to leadless 7/2023  # Hypertension  # Hyperlipidemia  # Hx of aortic dissection repair 2019  # Respiratory arrest 12/6   # Elevated BNP, improving   - Stable paced rhythm with underlying atrial flutter. On review of cardiology, underlying aflutter/afib has been present since at least June of this year. Device RN adjusted PPM to PTA settings rate 60.   - Pre-op 9/8/23 echo: LVEF 55/60%, mildly dilated RV, normal function  - Echo 12/4 with LVEF 60-65%, normal RV function, TV mean gradient 5 mmHg, no TR  - Echo 12/7 with no significant change from previous with the exception of mild reduction of RV. RV free wall dyskinesia.   - ASA 81 mg daily  - Midodrine 10 mg Q8H   - Midodrine 5 mg TID PRN for SBP less than 100   - Held PTA spironolactone 25 mg daily  - Started Warfarin  today (INR goal 2-3)      GI care / Nutrition:   # Multiple loose stools   # Hx of C. Difficile (9/2023)  # Severe malnutrition in the context of acute illness  - Nutrition consulted:   - Continue TF regimen post-surgery, as follows: Osmolite 1.5 Adan (or equivalent) @ goal of 45ml/hr (1080ml/day) + 1 pkt [employer] TF20 provides: 1700 kcals (30 kcal/kg), 87 g PRO (1.5 g/kg), 822 ml free H20, 219 g CHO, and 0 g fiber daily.   - Continue Banatrol administration for diarrhea  - % Intake: < 75% for > 7 days (moderate), Subcutaneous Fat Loss: Facial region:  Moderate, Upper arm:  moderate, and Lower arm:  mild. Muscle Loss: Temporal:  moderate, Facial & jaw region:  mild, Upper arm (bicep, tricep):  moderate, Lower arm (forearm):  mild, Dorsal hand:  moderate, and Posterior calf:  moderate. Fluid Accumulation/Edema: Moderate.   - NJT and enteral feeds at goal (45 mL/hr)   - SLP: silvia NICHOLAS for ice chips, recommends swallow study on Monday   - PPI famotidine  - Bowel regimen discontinued due to continued loose stools  - Finished vancomycin po 7 day course   - Continue Banatrol  - PRN Zofran  4 mg PO or IV Q6H  - PRN Compazine 5 mg IV Q6H   - PRN Imodium QID      Renal / Fluids / Electrolytes:   #Hypernatremia   #Hypokalemia, resolved   #CKD stage 3  #Lactic acidosis, resolved  #IMAN on CKD, resolved   BL creat ~1.2-1.3   FLUID STATUS: Pre-op weight 70.9 kg, current weight 74.1 kg (75 kg yesterday)   - Held PTA spironolactone 25 mg daily  - Held PTA torsemide   - Elevated sodium, Na 151 --> 147 on recheck, likely 2/2 aggressive diureses S/p bumex and chlorotiazide.   - Continue free water flushes @45 mL Q1H  - I/O has been inaccurate due to unmeasured stools and voids   - Replete lytes per protocol  - Avoid/limit nephrotoxins as able  - Strict I/O, daily weights, avoid/limit nephrotoxins     Endocrine:    #Stress induced hyperglycemia  #T2DM  Hgb A1c 5.7%  - Medium Sliding scale insulin   - PTA Jardiance on hold, resume once  medically optimized   - Goal BG <180 for optimal healing     ID / Antibiotics:  # Stress induced leukocytosis  # Probable aspiration pneumonitis vs pneumonia, resolved  - 11/30 Zosyn 7-day course for pneumonia (finished)   - 12/6 Given further decompensation, Vanco and meropenem was started on 12/6   - Finished meropenem 7 day course (12/12)   - Finished Vancomycin PO 7 day course (for c-diff) (12/13)  - Blood culture with NGTD   - Urine with Candida and sputum with Yeast, likely both colonizations.   - Urine culture with NGTD    #Hx of recent C. Difficile  - C. Difficile toxin PCR positive 12/3 with negative antigen and negative toxin - per antimicrobial stewardship team, more likely to be colonization with recent history and no apparent treatment  - Finished oral vancomycin course on 12/13  - Monitor fever curve, WBC, and inflammatory markers as appropriate  - Enteric precautions per infection prevention protocol      Heme:     # Acute blood loss anemia  - Hgb has been stable. Follow CBC.      MSK / Skin:  # s/p tracheostomy 12/13  # Mini-thoracotomy  # Rib fractures  #Surgical incision  # Perineal irritation   - Sutures to be removed from tracheostomy on POD#5  - Mini-thoracotomy precautions  - Continue postop incision cares   - WOC consulted for perineal irritation: Perineal and groin folds wound(s): BID and PRN with loose stools. Cleanse the area with Juarez cleanse and protect, very gently with DRY soft cloth. AVOID use of wet wipes or if needed use Juarez and dry wipes at the end of cares to help protect skin. Apply thin layer of Juarez antifungal 2x daily- may use critic aid paste all other times. With repeat application, do not scrub the paste, only remove soiled paste and reapply. If complete removal of paste is necessary use baby oil/mineral oil (#798313) and soft wash cloth. Ensure pt has Angel-cushion (#[id number]) while sitting up in the chair. Use only one Covidien pad in between mattress and pt. No brief in bed.      Lines/ tubes/ drains:  - Tracheostomy (12/13)  - PICC line   - NJT       Patient discharged on aspirin:  Yes 81 mg  Patient discharged on beta blocker: no    Patient discharged on ACE Inhibitor/ARB: no      Patient discharged on statin: no   Patient discharged on anticoagulation: yes Warfarin started 12/15       Therapy Recommendations:   PT DC Recommendations [facility]-Mot[facility] patient will benefit from intensive, interdisciplinary therapy. Anticipate will be able to tolerate 3 hours of therapy per day   OT DC Recommendations [facility] patient will benefit from intensive, interdisciplinary therapy. Anticipate will be able to tolerate 3 hours of therapy per day   SLP DC Recommendations [facility] patient will benefit from intensive, interdisciplinary therapy. Anticipate will be able to tolerate 3 hours of therapy per day       SLP Recommends: Patient continue trach dome with cuff deflated as tolerated. OK for PMSV use as tolerated with 1:1 supervision. Pt must be on trach dome with cuff deflated. Please remove PMSV if pt's SOB, fatigued, or sleeping. Recommend pt continue NPO with excellent oral cares. OK for sips of ice chips for comfort after oral cares. Pt would likely benefit from completion of video swallow study, however pt reported she is not quite ready for completion as she wants to take swallowing slow.    Significant Results and Procedures   s/p mini TVR on 11/28/2023 with Dr. Mabry  s/p tracheostomy 12/13/2023 with Dr. Pitts       Code Status   Full Code    SUBJECTIVE: Patient reports pain well controlled. Tolerating trach dome. No N/V. Complains of leg swelling.     Physical Exam   Temp: 97  F (36.1  C) Temp src: Axillary BP: 121/57 Pulse: 117   Resp: 27 SpO2: 98 % O2 Device: Trach dome Oxygen Delivery: 35 LPM  Vitals:    12/13/23 0400 12/14/23 1200 12/15/23 1010   Weight: 72.5 kg (159 lb 13.3 oz) 75 kg (165 lb 5.5 oz) 74.1 kg (163 lb 5.8 oz)     Vital  "Signs with Ranges  Temp:  [97  F (36.1  C)-98.4  F (36.9  C)] 97  F (36.1  C)  Pulse:  [] 117  Resp:  [13-36] 27  BP: (116-146)/() 121/57  MAP:  [55 mmHg-111 mmHg] 79 mmHg  Arterial Line BP: ()/(38-76) 124/54  FiO2 (%):  [30 %] 30 %  SpO2:  [97 %-100 %] 98 %  I/O last 3 completed shifts:  In: 1697 [I.V.:12; NG/GT:650]  Out: 925 [Urine:725; Other:200]    Physical exam:  General:  follows commands on all extremities alert and oriented, writes on paper to communicate  Neuro: A&O, follows commands, answers questions by writing on paper  Resp: breath sounds clear bilaterally, tracheostomy on PS   CV: V-paced around 60, extremities well perfused and DP intact.   Abdomen: Soft, non-distended, non-tender  Incisions: thoracotomy incision is cdi   Extremities: warm and well perfused. 1-2+ pitting edema BLE.     Discharge Disposition   Discharged to [facility]   Condition at discharge: Stable  Discharge VS: Blood pressure 132/58, pulse 62, temperature 97  F (36.1  C), temperature source Axillary, resp. rate 22, height 1.613 m (5' 3.5\"), weight 74.1 kg (163 lb 5.8 oz), SpO2 100%, not currently breastfeeding.    Consultations This Hospital Stay   RESPIRATORY CARE IP CONSULT  NUTRITION SERVICES ADULT IP CONSULT  CARDIAC REHAB IP CONSULT  PHYSICAL THERAPY ADULT IP CONSULT  RESPIRATORY CARE IP CONSULT  CARE MANAGEMENT / SOCIAL WORK IP CONSULT  SPEECH LANGUAGE PATH ADULT IP CONSULT  PHARMACY TO DOSE WARFARIN  NURSING TO CONSULT FOR VASCULAR ACCESS CARE IP CONSULT  VASCULAR ACCESS ADULT IP CONSULT  PHARMACY TO DOSE VANCO  ENT IP CONSULT  VASCULAR ACCESS FOR PICC PLACEMENT ADULT IP CONSULT  NUTRITION SERVICES ADULT IP CONSULT  ENT IP CONSULT  PHARMACY IP CONSULT  PHARMACY IP CONSULT  NURSING TO CONSULT FOR VASCULAR ACCESS CARE IP CONSULT  WOUND OSTOMY CONTINENCE NURSE  IP CONSULT  SPEECH LANGUAGE PATH ADULT IP CONSULT  SPEAKING VALVE WITH CUFF DEFLATION IP CONSULT  PHARMACY TO DOSE WARFARIN  PHYSICAL THERAPY " ADULT IP CONSULT  OCCUPATIONAL THERAPY ADULT IP CONSULT  NUTRITION SERVICES ADULT IP CONSULT  PHYSICAL THERAPY ADULT IP CONSULT  OCCUPATIONAL THERAPY ADULT IP CONSULT  SMOKING CESSATION PROGRAM IP CONSULT  CARDIAC REHAB IP CONSULT  NUTRITION SERVICES ADULT IP CONSULT  OCCUPATIONAL THERAPY ADULT IP CONSULT  PHARMACY IP CONSULT  PHARMACY TO DOSE WARFARIN  PHYSICAL THERAPY ADULT IP CONSULT  SMOKING CESSATION PROGRAM IP CONSULT  SPEAKING VALVE WITH CUFF DEFLATION IP CONSULT  SPEECH LANGUAGE PATH ADULT IP CONSULT    Discharge Orders      CARDIAC REHAB REFERRAL      PAC Visit Referral (For [facility] Only)      General info for SNF    Length of Stay Estimate: Short Term Care: Estimated # of Days 31-90  Condition at Discharge: Stable  Level of care:skilled   Rehabilitation Potential: Good  Admission H&P remains valid and up-to-date: Yes  Recent Chemotherapy: N/A  Use Nursing Home Standing Orders: Yes     Mantoux instructions    Give two-step Mantoux (PPD) Per Facility Policy Yes     Intake and output    Every shift     Daily weights    Call Provider for weight gain of more than 2 pounds per day or 5 pounds per week.     Wound care (specify)    Site:   Sternum and chest tube sites  Instructions:  You have dissolvable sutures under your skin that do not need to be removed.  Pat wound dressing dry after showers.  Skin glue will eventually fall off in 1-2 weeks.   Keep wound clean and dry, showers are okay after discharge, but don't let spray hit directly on incision. No baths or swimming for 1 month.  Clean wounds twice a day for 2 weeks with microklenz spray if available or just soap and water. Cover chest tube sites with gauze until they stop draining, then leave open.     Follow Up and recommended labs and tests    You will now return to the care of your primary provider and your cardiologist. Future medication refills should come from your PCP or Cardiologist.   You should see your primary care provider about 1 week after  discharge from [facility].   It is important to see your cardiologist about 4 weeks after discharge from [facility].     Activity - Up with nursing assistance     Reason for your hospital stay    You had your minimally invasive anterior thoracotomy TVR on 11/28/2023 with Dr. Mabry.  You are also s/p tracheostomy on 12/13/23.     Wound care    You have dissolvable sutures under your skin that do not need to be removed.  Pat wound dressing dry after showers.  Skin glue will eventually fall off in 1-2 weeks.   Keep wound clean and dry, showers are okay after discharge, but don't let spray hit directly on incision. No baths or swimming for 1 month.  Clean wounds twice a day for 2 weeks with microklenz spray if available or just soap and water. Cover chest tube sites with gauze until they stop draining, then leave open.     Full Code     Physical Therapy Adult Consult    Evaluate and treat as clinically indicated.    Reason:  Sternal precautions and deconditioning     Occupational Therapy Adult Consult    Evaluate and treat as clinically indicated.    Reason:    Sternal precautions and deconditioning     Nutrition Services Adult IP Consult    Reason:  Tube feed weaning     Contact Isolation    C-difficile     Adult Formula Tube Feeding    Follow this regimen upon discharge: Orders Placed This Encounter      Adult Formula Drip Feeding: Continuous Osmolite 1.5; Nasojejunal; Goal Rate: 45; mL/hr; Do not advance tube feeding rate unless K+ is = or > 3.0, Mg++ is = or > 1.5, and Phos is = or > 1.9     Cardiac sternal precautions     Walker Order for DME - ONLY FOR DME    I, the undersigned, certify that the above prescribed supplies are medically necessary for this patient and is both reasonable and necessary in reference to accepted standards of medical and necessary in reference to accepted standards of medical practice in the treatment of this patient's condition and is not prescribed as a convenience.      Diet    Follow  this diet upon discharge: Orders Placed This Encounter      Adult Formula Drip Feeding: Continuous Osmolite 1.5; Nasojejunal; Goal Rate: 45; mL/hr; Do not advance tube feeding rate unless K+ is = or > 3.0, Mg++ is = or > 1.5, and Phos is = or > 1.9     Discharge Medications   Current Discharge Medication List        CONTINUE these medications which have NOT CHANGED    Details   Acetaminophen 325 MG CAPS Take 325-650 mg by mouth every 4 hours as needed (Pain/Fever)      albuterol (PROAIR HFA/PROVENTIL HFA/VENTOLIN HFA) 108 (90 Base) MCG/ACT inhaler Inhale 2 puffs into the lungs every 6 hours as needed for shortness of breath / dyspnea or wheezing  Qty: 18 g, Refills: 0    Comments: Pharmacy may dispense brand covered by insurance (Proair, or proventil or ventolin or generic albuterol inhaler)  Associated Diagnoses: Cough      aspirin (ASA) 81 MG EC tablet Take 81 mg by mouth daily      cholecalciferol 25 MCG (1000 UT) TABS Take 1,000 Units by mouth daily       cyanocobalamin (VITAMIN B-12) 1000 MCG tablet Take 1,000 mcg by mouth daily       empagliflozin (JARDIANCE) 10 MG TABS tablet Take 1 tablet (10 mg) by mouth daily  Qty: 90 tablet, Refills: 3    Associated Diagnoses: Chronic diastolic congestive heart failure (H)      escitalopram (LEXAPRO) 10 MG tablet Take 1 tablet (10 mg) by mouth daily  Qty: 90 tablet, Refills: 3    Associated Diagnoses: NEDA (generalized anxiety disorder)      metoprolol tartrate (LOPRESSOR) 25 MG tablet Take 1 tablet (25 mg) by mouth 2 times daily  Qty: 180 tablet, Refills: 3    Associated Diagnoses: Chronic diastolic congestive heart failure (H); Benign essential hypertension; Persistent atrial fibrillation (H)      mirtazapine (REMERON) 15 MG tablet Take 1 tablet (15 mg) by mouth At Bedtime  Qty: 90 tablet, Refills: 3    Associated Diagnoses: Trouble in sleeping      spironolactone (ALDACTONE) 25 MG tablet Take 1 tablet (25 mg) by mouth daily  Qty: 90 tablet, Refills: 3    Associated  Diagnoses: Chronic diastolic congestive heart failure (H); Chronic right-sided heart failure (H)      torsemide (DEMADEX) 10 MG tablet Take 4 tablets(40mg) by mouth every day  Qty: 360 tablet, Refills: 3    Associated Diagnoses: Chronic diastolic congestive heart failure (H)      warfarin ANTICOAGULANT (COUMADIN) 2 MG tablet Take 2 to 4mg (1 to 2 tabs) by mouth daily OR AS DIRECTED.  Adjust dose based on INR.  Qty: 115 tablet, Refills: 1    Comments: Current dose is 4mg Sun/Thur and 2mg ROW  Associated Diagnoses: Chronic atrial fibrillation (H)           Allergies   Allergies   Allergen Reactions    Bactrim [Sulfamethoxazole-Trimethoprim]      pancytopenia    Amoxicillin Swelling     Face and body    Ciprofloxacin Hives     Data   Most Recent 3 CBC's:  Recent Labs   Lab Test 12/15/23  0500 12/14/23  0320 12/13/23  1722   WBC 11.4* 12.3* 10.7   HGB 8.8* 8.6* 8.9*   MCV 97 96 96    271 272      Most Recent 3 BMP's:  Recent Labs   Lab Test 12/15/23  1538 12/15/23  1144 12/15/23  0817 12/15/23  0508 12/15/23  0500 12/14/23  1958 12/14/23  1608   *  147*  --   --   --  151*  --  147*   POTASSIUM 4.2  --   --   --  4.0  --  4.1   CHLORIDE 106  --   --   --  110*  --  107   CO2 32*  --   --   --  29  --  29   BUN 42.1*  --   --   --  43.0*  --  45.5*   CR 0.81  --   --   --  0.89  --  0.90   ANIONGAP 9  --   --   --  12  --  11   BESS 9.0  --   --   --  9.0  --  9.1   * 103* 136*   < > 102*   < > 140*  145*    < > = values in this interval not displayed.     Most Recent 2 LFT's:  Recent Labs   Lab Test 12/15/23  0500 12/14/23  0320   AST 31 31   ALT 36 39   ALKPHOS 110 98   BILITOTAL 0.4 0.4     Most Recent INR's and Anticoagulation Dosing History:  Anticoagulation Dose History  More data exists         Latest Ref Rng & Units 12/9/2023 12/10/2023 12/11/2023 12/12/2023 12/13/2023 12/14/2023 12/15/2023   Recent Dosing and Labs   INR 0.85 - 1.15 2.51  1.45  1.20  1.15  1.10  1.07  1.03  1.06      Most  Recent 3 Troponin's:  Recent Labs   Lab Test 04/04/19  1644 01/04/19  1400   TROPI <0.015 <0.015     Most Recent 6 Bacteria Isolates From Any Culture (See EPIC Reports for Culture Details):  Recent Labs   Lab Test 04/04/19  1730 01/24/19  2322 01/24/19 2002 01/24/19  1440 01/24/19  1403 01/24/19  1005   CULT >100,000 colonies/mL  Klebsiella pneumoniae  * <1000 colonies/mL  urogenital aaron  Susceptibility testing not routinely done   No growth No growth No growth No growth     Most Recent TSH, T4 and A1c Labs:  Recent Labs   Lab Test 11/07/23  1102   A1C 5.7*     Results for orders placed or performed during the hospital encounter of 11/28/23   POC US Guidance Needle Placement    Narrative    Paravertebral catheter placement     Impression    Paravertebral catheter placement      XR Chest Port 1 View    Narrative    Portable chest    INDICATION: Postoperative CVTS surgery    COMPARISON: 8/1/2023    FINDINGS: Heart upper normal size. Median sternotomy again noted.  Interval placement of large bore right IJ catheter with tip in right  atrium. Endotracheal tube tip approximately 2.2 cm above the keegan.  Lead pacemaker again noted. Subsegmental atelectasis in the left lower  lung zone. Slightly increased interstitial markings in the right lung  suggesting mild edema or atelectasis. Interval tricuspid valve  replacement.      Impression    IMPRESSION: Interval tricuspid valve replacement with mild edema in  the lungs likely related to typical postoperative status. Support  devices as described.    NASEEM RUEDA MD         SYSTEM ID:  [id number]   XR Chest Port 1 View    Narrative    Exam: XR CHEST PORT 1 VIEW, 11/29/2023 1:10 AM    Indication: Post Op CVTS Surgery    Comparison: 11/28/2023    Findings:   Portable frontal view of the chest. Postsurgical changes of the chest  with intact median sternotomy wires. Right IJ sheath tip in the mid  SVC with removal of catheter component. Stable right-sided chest  tubes.  Stable tricuspid valve prosthesis and leadless pacemaker.  Endotracheal tube has been removed.    Stable enlarged cardiac silhouette. Elevation of right hemidiaphragm  with similar small pleural effusion. No appreciable pneumothorax.  Increased bilateral diffuse interstitial opacities and right greater  than left basilar streaky opacities.      Impression    Impression:   1. Endotracheal tube and right IJ central venous catheter have been  removed. Right IJ sheath and right-sided chest tubes are stable.  2. Increased bilateral pulmonary opacities, likely worsening pulmonary  edema and atelectasis.    I have personally reviewed the examination and initial interpretation  and I agree with the findings.    MIC NUR MD (Joe)         SYSTEM ID:  [id number]   XR Chest Port 1 View    Narrative    Exam: XR CHEST PORT 1 VIEW  11/29/2023 6:44 AM     History:  Respiratory acidosis, s/p tricuspid repair following  extubation, lookining for possible pneumothorax       Comparison:  Earlier same day chest radiograph    Findings: Single view of the chest. Postsurgical changes of the chest  with intact median sternotomy wires. Right IJ sheath in the mid SVC.  Stable tricuspid valve prosthesis, leadless pacemaker, right-sided  chest tubes. Stable cardiomediastinal silhouette. Stable small right  pleural effusion. Elevated right hemidiaphragm. No appreciable  pneumothorax. Increase in streaky left and dense right basilar  opacities.      Impression    Impression:   1. No appreciable pneumothorax.  2. Increased bibasilar opacities favored to represent worsening  edema/atelectasis.  3. Stable small right pleural effusion.    I have personally reviewed the examination and initial interpretation  and I agree with the findings.    MIC NUR MD (Joe)         SYSTEM ID:  [id number]   XR Chest Port 1 View    Narrative    Exam: XR CHEST PORT 1 VIEW, 11/30/2023 5:45 AM    Indication: Daily CXR in ICU    Comparison:  11/29/2023    Findings:   Portable frontal view of the chest. Postsurgical changes of the chest  with intact median sternotomy wires. Right IJ sheath in the high SVC.  Stable tricuspid valve prosthesis, leadless pacemaker, right-sided  chest tubes.    Stable mediastinal silhouette. Small pleural effusions, similar to  prior. No appreciable pneumothorax. Low lung volumes with persistent  bibasilar opacities. No new airspace opacity.      Impression    Impression:   1. Stable support devices.  2. No significant change in small pleural effusions and bibasilar  atelectasis/edema.    I have personally reviewed the examination and initial interpretation  and I agree with the findings.    GLADYS DENNIS MD         SYSTEM ID:  [id number]   XR Chest Port 1 View    Narrative    Portable chest 11/30/2023 at 0804 hours    INDICATION: Hypoxia    COMPARISON: 11/30/2023 at 0543 hours    Findings: Heart is mildly enlarged. Lead left pacemaker again noted.  Median sternotomy again present. Large bore right IJ approach catheter  tip in the SVC. Right thoracostomy tube again present. No definitive  pneumothorax. Haziness in the right lower lung field is similar to  minimally increased. Right hemidiaphragm remains mildly elevated.      Impression    IMPRESSION: Continued elevation right hemidiaphragm with slight  increased edema or atelectasis at the right lung base. No definitive  pneumothorax.    NASEEM RUEDA MD         SYSTEM ID:  [id number]   XR Chest Port 1 View    Narrative    Exam: XR CHEST PORT 1 VIEW, 12/1/2023 12:31 AM    Indication: Daily CXR in ICU    Comparison: 11/30/2023    Findings:   Portable frontal view of the chest. Postsurgical changes of the chest  with intact median sternotomy wires. Right IJ sheath in the mid SVC.  Stable tricuspid valve prosthesis, leadless pacemaker, right chest  tubes.    Stable enlarged cardiac silhouette. Similar small pleural effusions.  No pneumothorax. Increased left basilar  consolidation. Ongoing diffuse  interstitial and right basilar opacities.      Impression    Impression:   1. Stable support devices.  2. Increased  left basilar consolidation, possibly infection versus  atelectasis. Additional bilateral diffuse opacities typical of  atelectasis/edema.    I have personally reviewed the examination and initial interpretation  and I agree with the findings.    JESSICA GARCIA DO         SYSTEM ID:  [id number]   XR Chest Port 1 View    Narrative    EXAM: Chest radiograph 12/1/2023 8:50 PM    HISTORY: 83 years Female Chest tube removal.     COMPARISON: Chest x-ray 12/1/2023 at 00:30    TECHNIQUE: Portable AP view of the chest.    FINDINGS:   Post surgical changes of the chest with intact median sternotomy  wires. Right IJ sheath in the mid SVC. Stable tricuspid valve  prosthesis, and leadless pacemaker. Interval removal of the right  chest tube.    Stable enlarged cardiac silhouette size. Trachea is approximately  midline. Stable small pleural effusions. No pneumothorax. Ongoing  diffuse interstitial and bibasilar opacities. Elevation of the right  hemidiaphragm.  Visualized upper abdomen is unremarkable. No acute  osseous abnormality.      Impression    IMPRESSION:   1.  Interval removal of the right chest tubes. No pneumothorax. Other  support devices as above.  2.  Minimally progressed left basilar consolidation. May represent  developing infection versus atelectasis..    I have personally reviewed the examination and initial interpretation  and I agree with the findings.    SAMI EVANS MD         SYSTEM ID:  [id number]   XR Chest Port 1 View    Narrative    EXAM: Chest radiograph 12/2/2023 11:43 AM    HISTORY: Concern for pneumonia.     COMPARISON: Chest radiograph 12/1/2023. CT 2/3/2023.    TECHNIQUE: Portable AP view of the chest.    FINDINGS: Postsurgical chest with intact sternotomy wires. Removal of  a right IJ sheath. Tricuspid valve prosthesis. Leadless pacemaker  device.  Unchanged right hemidiaphragmatic elevation with associated  streaky basilar platelike atelectasis. The trachea is midline and the  cardiac size is stable. Patchy opacities in the left lung are similar  to the most recent comparison radiograph however worsened from  11/30/2023. There is no pneumothorax or left-sided pleural effusion.      Impression    IMPRESSION: Left-sided opacities may represent pneumonia.    I have personally reviewed the examination and initial interpretation  and I agree with the findings.    SAMI EVANS MD         SYSTEM ID:  [id number]   XR Chest Port 1 View    Narrative    EXAM: Chest radiograph 12/3/2023 8:36 AM    HISTORY: 83 years Female s/p mini tv repair.     COMPARISON: Chest x-ray 12/2/2023.    TECHNIQUE: Portable AP view of the chest.    FINDINGS:   Postsurgical changes of the chest with intact median sternotomy wires.  With this pacemaker device. Tricuspid valve prosthesis. Trachea is  approximately midline. Stable cardiac silhouette size. Stable right  elevation of the hemidiaphragm. There is adjacent streaky bibasilar  atelectasis prominently on the right. Diffuse left lung patchy  opacities, similar to previous exam. There is no pneumothorax.  Blunting of the right costophrenic angle. The visualized upper abdomen  is unremarkable. No acute or suspicious osseous and abnormality. Soft  tissues are unremarkable.       Impression    IMPRESSION:   1.  Diffuse interstitial opacities on the left are slightly improved  compared to previous exam, may represent improving atelectasis versus  resolving infection.  2.  Stable right-sided pleural effusion versus pleural-based scarring.    I have personally reviewed the examination and initial interpretation  and I agree with the findings.    DAVY TORRES MD         SYSTEM ID:  [id number]   XR Chest Port 1 View    Narrative    Exam: XR CHEST PORT 1 VIEW, 12/3/2023 5:53 PM    Indication: concern for worsening SOB, fatigue from work of  breathing    Comparison: 12/3/2023    Findings:   Single portable AP view of the chest. Stable post surgical changes of  the chest. Trachea is midline. No pneumothorax. Stable diffuse  opacities predominantly of the left lung. Stable elevation of right  hemidiaphragm with likely small amount of right pleural fluid. Stable  cardiac silhouette and mediastinum.      Impression    Impression:   1. Increased small right pleural effusion.  2. Stable pulmonary opacities and post surgical changes of the chest.    I have personally reviewed the examination and initial interpretation  and I agree with the findings.    DAVY TORRES MD         SYSTEM ID:  [id number]   XR Chest Port 1 View    Narrative    Exam: XR CHEST PORT 1 VIEW, 12/4/2023 12:30 AM    Comparison: 12/3/2023    History: increasing SOB, is the effusion continuing to increase?    Findings:  Single AP portable semiupright view of the chest. Median sternotomy  wires. Aortic valve replacement.    Trachea is midline. Stable cardiomediastinal silhouette. Stable  elevation of the right hemidiaphragm. Perihilar and bibasilar mixed  opacities. Left upper lung mixed opacities. No pneumothorax. Small  right basilar effusion and small left apical effusion appears similar.  The upper abdomen is unremarkable.      Impression    Impression:   1. Increasing perihilar and bibasilar opacities represent increased  atelectasis, edema.  2. Grossly stable right pleural effusion and atelectasis.    I have personally reviewed the examination and initial interpretation  and I agree with the findings.    JESSICA GARCIA DO         SYSTEM ID:  [id number]   XR Chest Port 1 View    Narrative    Exam: XR CHEST PORT 1 VIEW, 12/4/2023 9:07 AM    Comparison: Radiograph 12/4/2023, 12/3/2023, 12/2/2023    History: concern for worsening respiratory status    Findings:  Portable AP view of the chest. Stable postsurgical changes of the  chest of intact median sternotomy wires and mediastinal clips.  Aortic  valve replacement. Loop recorder.      Trachea is midline. Cardiomediastinal silhouette is stable. Stable  elevation of the right hemidiaphragm. Slightly decreased multifocal  streaky pulmonary opacities bilaterally. Stable trace right pleural  effusion. No pneumothorax.      Impression    Impression:   Slightly decreased multifocal streaky pulmonary opacities bilaterally  with stable trace right pleural effusion.    I have personally reviewed the examination and initial interpretation  and I agree with the findings.    NASEEM RUEDA MD         SYSTEM ID:  [id number]   XR Chest Port 1 View    Narrative    Portable chest    INDICATION: Assessing lung fields    COMPARISON: Yesterday    FINDINGS: Heart is enlarged. Median sternotomy again noted. Right  hemidiaphragm remains elevated. Slight right calcific angle blunting  unchanged which may indicate small pleural effusion oral lung base  atelectasis. No ectopic air or interval consolidation. Left-sided  pulmonary opacities are unchanged. Loop recorder.      Impression    IMPRESSION: No significant changes radiographically from yesterday.    NASEEM RUEDA MD         SYSTEM ID:  [id number]   XR Abdomen Port 1 View    Narrative    EXAMINATION:  XR ABDOMEN PORT 1 VIEW 12/6/2023     COMPARISON: CT 2/3/2023, chest x-ray 12/5/2023.    HISTORY: monitor for dilated bowel with ongoing CDiff.    TECHNIQUE: Two frontal supine views of the abdomen.    FINDINGS: Postsurgical changes of the chest and support devices  similar to prior chest radiograph from 12/5/2023 No abnormally dilated  air-filled loops of bowel. Persistent elevation of the right  hemidiaphragm. No pneumatosis or portal venous gas.      Impression    IMPRESSION:     1. No air-filled dilated bowel loop  2. Persistent elevation of the right hemidiaphragm    I have personally reviewed the examination and initial interpretation  and I agree with the findings.    ELIZABETH VILLALBA MD         SYSTEM ID:   [id number]   XR Chest Port 1 View    Narrative    EXAM: Chest radiograph 12/6/2023 4:06 PM    HISTORY: 83 years Female increased SOB.     COMPARISON: Chest x-ray 12/5/2023.    TECHNIQUE: Portable AP view of the chest.    FINDINGS:   Postsurgical changes of the chest. Median sternotomy wires are intact.  Mediastinal surgical clips. Leadless cardiac pacer device overlying  the lower left chest. Tricuspid valve prosthesis. Trachea is  approximately midline. Stable elevation of the right hemidiaphragm.  Mildly increased diffuse interstitial opacities with bilateral  costophrenic angle blunting. No pneumothorax. The visualized upper  abdomen is unremarkable. No acute osseous abnormality.      Impression    IMPRESSION:   1.  Minimally increased diffuse interstitial opacities, likely  represents postsurgical edema/atelectasis. However, underlying  superimposed infectious etiology cannot be entirely ruled out.  Attention on follow-up.  2.  Postsurgical changes of the chest are stable. Support devices as  above.  3.  Small-to moderate bilateral effusions. Decreased on right.    I have personally reviewed the examination and initial interpretation  and I agree with the findings.    NASEEM RUEDA MD         SYSTEM ID:  [id number]   XR Chest Port 1 View    Narrative    Exam: XR CHEST PORT 1 VIEW, 12/6/2023 11:02 PM    Comparison: 12/6/2023, CT chest abdomen and pelvis 12/6/2023    History: Endotracheal tube positioning    Findings:  Single AP portable semiupright view of the chest. Endotracheal tube  with tip in the midthoracic trachea. Enteric tube traverses into the  stomach and below the field-of-view. Median sternotomy wires.    Trachea is midline stable widened mediastinal silhouette. Mixed  interstitial opacities throughout the left lung. No pneumothorax. The  upper abdomen is unremarkable.      Impression    Impression:   1. New endotracheal tube with tip in the mid thoracic trachea.  2. Increased left lung opacity is  represent atelectasis, and edema.  3. Improved aeration of the right lung.    I have personally reviewed the examination and initial interpretation  and I agree with the findings.    CASSIE WALKER MD         SYSTEM ID:  [id number]   XR Abdomen Port 1 View    Narrative    EXAM: XR ABDOMEN PORT 1 VIEW  12/6/2023 11:10 PM     HISTORY:  Check NG/OG tube placement       TECHNIQUE: Single frontal radiograph of the abdomen    COMPARISON:  12/6/2020    FINDINGS:   Single AP portable semiupright view of the chest. Enteric tube is in  the stomach..    Nonobstructive bowel gas pattern. No pneumatosis, portal venous gas.      Impression    IMPRESSION: Enteric tube is in the stomach..     I have personally reviewed the examination and initial interpretation  and I agree with the findings.    CASSIE WALKER MD         SYSTEM ID:  [id number]   CT Head w/o Contrast    Narrative    EXAM: CT HEAD W/O CONTRAST  12/6/2023 8:54 PM     HISTORY:  Shock, sepsis, code blue       COMPARISON:  None available    TECHNIQUE: Using multidetector thin collimation helical acquisition  technique, axial, coronal and sagittal CT images from the skull base  to the vertex were obtained without intravenous contrast.   (topogram) image(s) also obtained and reviewed.    FINDINGS:  No acute intracranial hemorrhage, mass effect, or midline shift. No  acute loss of gray-white matter differentiation in the cerebral  hemispheres. Ventricles are proportionate to the cerebral sulci. Clear  basal cisterns. Low-attenuation in the periventricular white matter,  likely representing chronic small vessel ischemic disease given  patient's age. Moderate diffuse parenchymal volume loss.    The bony calvaria and the bones of the skull base are normal. The  visualized portions of the paranasal sinuses and mastoid air cells are  clear. Grossly normal orbits.       Impression    IMPRESSION: No acute intracranial pathology.     I have personally reviewed the  examination and initial interpretation  and I agree with the findings.    RIDDHI MTZ MD         SYSTEM ID:  [id number]   CT Chest Abdomen Pelvis w/o Contrast    Narrative    EXAMINATION: CT CHEST ABDOMEN PELVIS W/O CONTRAST, 12/6/2023 8:56 PM    INDICATION: Shock, sepsis, code blue    COMPARISON STUDY: CT of 2/3/2023    TECHNIQUE: CT scan of the chest, abdomen and pelvis was performed on  multidetector CT scanner using volumetric acquisition technique and  images were reconstructed in multiple planes with variable thickness  and reviewed on dedicated workstations.     CONTRAST: Without.   Without oral contrast.    CT scan radiation dose is optimized to minimum requisite dose using  automated dose modulation techniques.    FINDINGS:    Endotracheal tube terminates at the keegan.    Chest:   Limited noncontrast examination shows postoperative changes from type  A aortic dissection repair with its thin hyperdense dissection flap  extending from the distal aspect of the repair to the abdominal aortic  bifurcation as well as into the left common carotid artery, grossly  unchanged from comparison to prior contrast enhanced examination.    The heart is enlarged. Tricuspid valve prosthesis and leadless pacer  in the right atrium. Trace pericardial effusion. The esophagus is  unremarkable. No thoracic lymphadenopathy.    Trace bilateral pleural effusions. No pneumothorax. Near complete  collapse of the left lower lobe. Subsegmental dependent atelectasis in  the right lower lobe. Additional scattered groundglass and  consolidative opacities throughout the left upper lobe. Extensive  endobronchial mucous plugging in the left lower lobe. Mild bilateral  interlobular septal thickening.    Abdomen and Pelvis:  Liver: Stable too small to characterize hepatic hypodensities.  Otherwise unremarkable liver.    Biliary System: Cholelithiasis without evidence of acute  cholecystitis. No extrahepatic biliary dilatation.    Pancreas:  No mass or pancreatic ductal dilation.    Adrenal glands: No mass or nodules    Spleen: Normal.    Kidneys: Small nonobstructive renal calculi bilaterally, unchanged. No  hydronephrosis.    Gastrointestinal tract: Normal caliber small and large bowel. The  appendix is within normal limits. Colonic diverticulosis without  evidence of acute diverticulitis.    Mesentery/peritoneum/retroperitoneum: Mesenteric edema without free  fluid or free air.    Lymph nodes: No significant lymphadenopathy.    Pelvis: Urinary bladder is normal. Uterus and adnexa within normal  limits.    Osseous structures: Diffuse osteopenia with L2 level degenerative  changes of the spine and T12, L1 compression deformities, unchanged.  Acute mildly displaced and nondisplaced anterolateral right third,  fourth, and fifth and left fourth and fifth rib fractures.    Soft tissues: Diffuse subcutaneous anasarca.  Presumed chest wall  hematoma overlying the fractured right ribs, measuring 1.5 cm in  thickness.  3.5 cm collection in the right groin, likely a post  vascular access hematoma.  Hyperattenuating enlargement of the left  pectineus muscle.        Impression    IMPRESSION:     1. Limited noncontrast examination with grossly stable appearance of  type A aortic dissection repair with dissection flap extending from  the distal aspect of the repair through the abdominal aortic  bifurcation.  2. Acute mildly displaced and nondisplaced right 3-5 rib fractures  with associated right chest wall hematoma and nondisplaced  anterolateral left fourth and fifth rib fractures, presumably related  to cardiopulmonary resuscitative efforts.   3. Near complete atelectasis of the left lower lobe associated with  extensive and bronchial mucous plugging; consider aspiration.  Subsegmental atelectasis of the right lower lobe. Additional scattered  groundglass and consolidative opacities throughout the left upper lobe  may represent edema and/or infection.  4. Trace  bilateral pleural effusions.  5. 3.5 cm right groin collection, presumably a post vascular access  hematoma.  5. Left pectineus intramuscular hematoma.  6. No acute intra-abdominal pathology suspected.  7. Endotracheal tube terminates at the keegan. Recommend retraction.    I have personally reviewed the examination and initial interpretation  and I agree with the findings.    ADRIANNA GAYTAN DO         SYSTEM ID:  [id number]   XR Chest Port 1 View    Narrative    Portable chest 12/7/2023 at 0106 hours    Indication: Daily monitoring. Short of breath.    COMPARISON: Yesterday 2259 hours    FINDINGS: Previous pacemaker. Median sternotomy. Endotracheal  intubation with tube tip approximately 4.2 cm above the keegan. No  ectopic air. Patchy densities especially in the left lung with left  costophrenic angle blunting appears similar in the short interval.  Retrocardiac density prominence and silhouetting the left  hemidiaphragm unchanged in the short interval.      Impression    IMPRESSION: Continued left pleural effusion with retrocardiac  atelectasis and perihilar edema.    NASEEM RUEDA MD         SYSTEM ID:  [id number]   XR Chest Port 1 View    Narrative    Portable chest 12/7/2023 at 1220 hours    INDICATION: Not given    COMPARISON: 0106 hours earlier today    FINDINGS: Median sternotomy again noted. Tricuspid valve repair.  Leadless pacemaker again present. NG/OG tube beyond the inferior  margin of the image. Endotracheal tube tip approximately 3.4 cm above  the keegan. Left costophrenic angle blunting an retrocardiac density  slightly improved. Subtle opacities again noted in the right lower  lung field near the costophrenic angle.      Impression    IMPRESSION: Improved left lung base aeration    NASEEM RUEDA MD         SYSTEM ID:  [id number]   XR Chest Port 1 View    Narrative    Exam: XR CHEST PORT 1 VIEW, 12/8/2023 1:15 AM    Comparison: 12/7/2023    History: daily monitoring, SOB, ? pulm  edema    Findings:  Single AP portable semiupright view of the chest. Endotracheal tube  tip in the mid thoracic trachea. Enteric tube traverses the stomach  and below the field-of-view. Right upper extremity PICC with tip in  right atrium. Mitral valve prosthesis. Leadless pacemaker in stable  position.    Trachea is midline. Stable cardiac silhouette. Retrocardiac streaky  opacities. Mild diffuse interstitial opacities. Elevation of the right  hemidiaphragm. No pneumothorax. Trace blunting of the costophrenic  angles. The upper abdomen is unremarkable.      Impression    Impression:   1. Decreased left basilar atelectasis. Mild pulmonary edema.  2. Elevation of the right hemidiaphragm.  3. Stable small bilateral pleural effusions.    I have personally reviewed the examination and initial interpretation  and I agree with the findings.    ADRIANNA GAYTAN DO         SYSTEM ID:  [id number]   XR Abdomen Port 1 View    Narrative    Exam: XR ABDOMEN PORT 1 VIEW, 12/8/2023 2:53 PM    Indication: Verify small bowel feeding tube bedside placement    Comparison: 12/6/2023    Findings:   Single portable AP view of the abdomen for purposes of feeding tube  placement check. The feeding tube is postpyloric with the tip in the  distal duodenum. Partially visualized postsurgical changes of the  chest. No distended loops of bowel.      Impression    Impression: Postpyloric feeding tube with the tip in the distal  duodenum. No distended loops of bowel.    I have personally reviewed the examination and initial interpretation  and I agree with the findings.    CASSIE WALKER MD         SYSTEM ID:  [id number]   XR Chest Port 1 View    Narrative    Exam: XR CHEST PORT 1 VIEW  12/8/2023 5:36 PM     History:  ETT evaluation       Comparison:  Earlier same day chest radiograph    Findings: Single view of the chest. Postsurgical changes of the chest  with intact median sternotomy wires. Endotracheal tube tip projects  over the upper to  midthoracic trachea. Enteric tube courses below the  diaphragm. Right upper extremity PICC tip near the cavoatrial  junction. Mitral valve prosthesis. Leadless pacemaker.    Stable cardiac silhouette. Probable small bilateral pleural effusions.  No pneumothorax. Increased perihilar and bibasilar mixed interstitial  and airspace opacities. Asymmetric elevation of the right  hemidiaphragm.      Impression    Impression:   1. Stable support devices. Endotracheal tube tip projects over the  upper to midthoracic trachea.  2. Small pleural effusions with increased bilateral pulmonary  opacities, likely atelectasis/edema.    I have personally reviewed the examination and initial interpretation  and I agree with the findings.    ADRIANNA GAYTAN DO         SYSTEM ID:  [id number]   XR Chest Port 1 View    Narrative    Exam: XR CHEST PORT 1 VIEW, 12/9/2023 2:06 AM    Comparison: 12/8/2023    History: daily monitoring, SOB, ? pulm edema    Findings:  Single AP portable semiupright view of the chest. Endotracheal tube  with tip in the midthoracic trachea. Right upper extremity PICC with  tip in the high right atrium. Cardiac valve prosthesis. Leaflets  pacemaker in stable position. Mediastinal surgical clips. Enteric tube  courses into the stomach and below the field of view.    Trachea is midline. Stable cardiomediastinal silhouette. Mild  bibasilar streaky opacities. Elevation of the right hemidiaphragm. No  pneumothorax. Small bilateral pleural effusions. Asymmetric elevation  of the right hemidiaphragm.      Impression    Impression:   1. Decreased bibasilar atelectasis and findings of pulmonary edema.  2. Stable small pleural effusions.    I have personally reviewed the examination and initial interpretation  and I agree with the findings.    ADRIANNA GAYTAN DO         SYSTEM ID:  [id number]   XR Chest Port 1 View    Narrative    Exam: XR CHEST PORT 1 VIEW, 12/10/2023 2:23 AM    Comparison: 12/9/2023    History: daily  monitoring, SOB, ? pulm edema    Findings:  Single AP portable semiupright view of the chest. Endotracheal tube  with tip in the mid upper thoracic trachea. Enteric tube traverses  into the stomach in the field-of-view. Right upper extremity PICC with  tip in the high right atrium. Cardiac valve prosthesis. Leadless  pacemaker.    Trachea is midline. Stable cardiomediastinal silhouette. Perihilar and  bibasilar opacities. No pneumothorax. Small bilateral effusions.  Asymmetric elevation of the right hemidiaphragm.      Impression    Impression:   1. Increased bibasilar and perihilar predominant edema and  atelectasis, particularly on the right.  2. Stable small pleural effusions.    I have personally reviewed the examination and initial interpretation  and I agree with the findings.    ADRIANNA GAYTAN DO         SYSTEM ID:  [id number]   XR Chest Port 1 View    Narrative    Exam: XR CHEST PORT 1 VIEW, 12/10/2023 9:36 AM    Indication: eval ETT placement    Comparison: 12/10/2023    Findings:   The endotracheal tube tip at the mid to low thoracic trachea 3.1 cm  above the keegan. Right upper extremity PICC tip at the high right  atrium. Leadless pacemaker in stable position. Tricuspid valve  prosthesis. Enteric tube courses into the stomach and below the field  of view. Median sternotomy wires and mediastinal surgical clips.  Stable cardiomegaly mediastinal silhouette. The pulmonary vasculature  is indistinct with bilateral interstitial prominence. No appreciable  pleural effusion or pneumothorax. Slightly decreased streaky perihilar  and right greater than left basilar airspace opacities. Continued  asymmetric elevation of the right hemidiaphragm.      Impression    Impression:   1. Endotracheal tube advanced with tip at the mid to lower thoracic  trachea, 3.1 cm above the keegan.  2. Slightly decreased streaky perihilar and right greater than left  basilar airspace opacities.    ADRIANNA GAYTAN DO         SYSTEM ID:   [id number]   XR Chest Port 1 View    Narrative    EXAM: XR CHEST PORT 1 VIEW 12/10/2023 6:48 PM    DEMOGRAPHICS: Female of 83 years    INDICATION: Tube check    COMPARISON: 0925 hours    TECHNIQUE: Single portable AP view of the chest.    FINDINGS:   Endotracheal tube at the mid thoracic trachea. Partially visualized  enteric tube. Intact sternotomy wires. Tricuspid valve replacement  stable in position. Right upper extremity PICC tip terminates at the  right atrium. Leadless pacemaker is stable in position. Right  hemidiaphragm remains elevated. Linear attenuation in the right lung.  Trachea is midline. Aortic knob is enlarged. Upper abdomen is  unremarkable. No pneumothorax.       Impression    IMPRESSION:   1.  Persistently elevated right hemidiaphragm with right right  subsegmental atelectasis.  2.  Support devices appear stable.    I have personally reviewed the examination and initial interpretation  and I agree with the findings.    GLADYS DENNIS MD         SYSTEM ID:  [id number]   XR Chest Port 1 View    Narrative    EXAM: Chest radiograph 12/11/2023 1:10 AM    HISTORY: 83 years Female daily monitoring, SOB, ? pulm edema.     COMPARISON: Chest x-ray 12/10/2023.    TECHNIQUE: Portable AP view of the chest.    FINDINGS:   Endotracheal tube projects 3.1 cm cephalad to the keegan. Leadless  pacer device overlying the left mediastinum. Right upper extremity  PICC with tip terminating in the right atrium. Enteric tube crosses  the diaphragm and projects out of the field of view. Postsurgical  changes of the chest. Intact median sternotomy wires and mediastinal  surgical clips. Tricuspid valve prosthesis.    Trachea is midline. The cardiac silhouette size is stable. Stable  elevation of the right hemidiaphragm. Mildly improved right perihilar  bibasilar opacities. Diffuse interstitial opacities are again  visualized. The aortic knob is enlarged. No definite pneumothorax. No  appreciable pleural effusion. The upper  abdomen is unremarkable.      Impression    IMPRESSION:   1.  Persistent elevation of the right hemidiaphragm with right  subsegmental atelectasis which is mildly improved compared to the  previous exam.  2.  Endotracheal tube projects 3.1 cm cephalad to the keegan. Other  support devices are stable.    I have personally reviewed the examination and initial interpretation  and I agree with the findings.    DAVY TORRES MD         SYSTEM ID:  [id number]   XR Chest Port 1 View    Narrative    EXAM: Chest radiograph 12/12/2023 12:55 AM    HISTORY: 83 years Female daily monitoring, SOB, ? pulm edema.     COMPARISON: Chest x-ray 12/11/2023.    TECHNIQUE: Portable AP view of the chest.    FINDINGS:   The endotracheal tube tip is approximately 3.3 cm cephalad to the  keegan. Endotracheal tube cuff diameter measures approximately 3.3 cm.  Enteric tube crosses the diaphragm and projects out of the field of  view. The right upper extremity PICC tip terminates within the right  atrium. Lead-less pacer device. Postsurgical changes of the chest.  Tricuspid valve prosthesis. Median sternotomy wires are intact.  Mediastinal surgical clips are again visualized.    Trachea is midline. The cardiac silhouette size is stable. Elevation  of the right hemidiaphragm is stable. Increased streaky pulmonary  opacities involving the right perihilar and mid to lower lung zone.  Small right pleural effusion. Diffuse interstitial opacities are again  visualized. No pneumothorax.    The visualized upper abdomen is unremarkable. Osseous structures are  unchanged.      Impression    IMPRESSION:   1.  Persistent elevation of right hemidiaphragm with increased  opacities within the perihilar and mid to lower lung zone, likely, a  short atelectasis and pleural fluid.  2.  Endotracheal tube projects approximately 3.3 cm cephalad to the  keegan. Endotracheal tube cuff diameter measures approximately 3.3 cm,  raising concern for balloon  hyperinflation.  3.  Additional life support lines and tubes as above.    I have personally reviewed the examination and initial interpretation  and I agree with the findings.    ROSEMARY APARICIO MD         SYSTEM ID:  [id number]   XR Chest Port 1 View    Narrative    EXAM: XR CHEST PORT 1 VIEW 12/12/2023 1:53 PM    DEMOGRAPHICS: 83 years Female    INDICATION: Recheck ETT placement    COMPARISON: Chest x-ray 12/12/2023 (0052), 12/11/2023, 12/10/2023.    TECHNIQUE: Single portable AP view of the chest.    FINDINGS:   Endotracheal tube tip terminates in the midthoracic trachea, 4.7 cm  above the level of the keegan. Enteric tube extends towards the  stomach, out of view. Right-sided PICC terminates in the cavoatrial  junction.     Stable median sternotomy wires, mediastinal surgical clips. Trachea is  midline. Stable cardiac silhouette. Unchanged elevation of the right  hemidiaphragm. No pneumothorax. Trace blunting of the right  costophrenic angle. Diffuse perihilar and bibasilar streaky opacities,  similar to prior chest x-ray on same day.      Impression    IMPRESSION:   1.  Endotracheal tube tip terminates in the midthoracic trachea, 4.7  cm above the level of the keegan. Remainder of tubes/lines appear  stable as above.  2.  Persistent elevation of the right hemidiaphragm with adjacent  streaky opacities, likely atelectasis.  3.  Unchanged perihilar and basilar interstitial opacities.    I have personally reviewed the examination and initial interpretation  and I agree with the findings.    NASEEM RUEDA MD         SYSTEM ID:  [id number]   XR Chest Port 1 View    Narrative    Exam: XR CHEST PORT 1 VIEW, 12/12/2023 2:05 PM    Comparison: Radiograph 12/12/2023, 12/11/2023    History: ETT Recheck s/p advancement    Findings:  Portable AP view of the chest. Endotracheal tube tip has been  advanced, now projecting 2.8 cm above the level of the keegan. Feeding  tube tip projects beyond the field-of-view. Right upper  extremity PICC  line tip projects over the low right atrium. Stable postsurgical  changes of the chest with median sternotomy wires and mediastinal  clips. Micra device. Tricuspid valve replacement. Stable enlarged  cardiac mediastinal silhouette. Aortic arch calcifications. Similar  mild elevation of the right hemidiaphragm. Persistent streaky  perihilar and left basilar opacities. Suspected small right pleural  effusion. No appreciable pneumothorax.      Impression    Impression:   1. Endotracheal tube tip has been advanced now projecting 2.8 cm above  the level of the keegan. Additional support devices as described  above.  2. Similar elevation of the right hemidiaphragm with adjacent right  basilar.  3. Persistent streaky perihilar and left basilar opacities with small  right pleural effusion.    I have personally reviewed the examination and initial interpretation  and I agree with the findings.    SAMI EVANS MD         SYSTEM ID:  [id number]   XR Chest Port 1 View    Narrative    Portable chest    INDICATION: Post tracheostomy placement    COMPARISON: Yesterday 1400 hours    FINDINGS: Image at 1642 hours today shows upper normal size of the  heart. Switch to previous endotracheal tube for new tracheostomy  apparatus. Median sternotomy again noted. Feeding tube beyond the  inferior margin of the image. Right extremity PICC line tip now in the  right atrium. Leadless pacemaker.  Patchy opacities in the lungs appear increased in the right lung base  with meniscus formation at the expected costophrenic angle region  which may represent developing right pleural effusion.      Impression    IMPRESSION: Possible developing right pleural effusion with other  atelectasis or edema in the lungs. Switch to previous endotracheal  tube 4 current tracheostomy which appears grossly appropriately  positioned.    NASEEM RUEDA MD         SYSTEM ID:  [id number]   XR Chest Port 1 View    Narrative    Portable  chest    INDICATION: Status post tracheostomy. Evaluate right-sided effusion    COMPARISON: Yesterday's 1642 hours    FINDINGS: Numerous support devices including tracheostomy, leadless  pacemaker, feeding tube and right upper extremity PICC line all appear  unchanged.  Heart is upper normal size. The right costophrenic angle blunting and  silhouetting the right hemidiaphragm appear overall similar allowing  for slight changes in positioning. There are median sternotomy again  noted along with tricuspid valve replacement.      Impression    IMPRESSION: Unchanged right pleural effusion an lung base atelectasis.  Tricuspid valve replacement.    NASEEM RUEDA MD         SYSTEM ID:  [id number]   XR Chest Port 1 View    Narrative    EXAM: Chest radiograph 12/14/2023 5:59 AM    HISTORY: 83 years Female s/p trach eval, right sided effusion.     COMPARISON: Chest x-ray 12/14/2023.    TECHNIQUE: Portable AP view of the chest.    FINDINGS:     Tracheostomy tube tip is within the upper thoracic trachea. Enteric  tube crosses the diaphragm and projects out of the field-of-view.  Right upper extremity PICC tip terminates within the right atrium.  Lead-less pacer device. Postsurgical changes of the chest intact  median sternotomy wires and mediastinal surgical clips. Tricuspid  valve prosthesis.    Trachea is midline. The cardiac silhouette size is enlarged, stable.  Persistent elevation of the right hemidiaphragm is stable. Continued  low lung volumes on the right. Perihilar and bibasilar persistent  pulmonary opacities are not significant changed. Similar right-sided  effusion. No pneumothorax. Visualized upper abdomen is unremarkable.       Impression    IMPRESSION:     1.  Support devices and postoperative changes are stable.  2.  Similar perihilar and bibasilar opacities, favors  atelectasis/edema with right-sided pleural effusion.    I have personally reviewed the examination and initial interpretation  and I agree with  the findings.    ELIZABETH VILLALBA MD         SYSTEM ID:  [id number]   Echocardiogram Limited     Value    LVEF  60-65%    Narrative    [id number]  [id number]  [id number]  [id number]^JULIA^TIFFANIE^VAL     [facility],[place]  Echocardiography Laboratory  [address] [place] [id number]     Name: CEM MORRIS  MRN: [id number]  : 1940  Study Date: 2023 10:32 AM  Age: 83 yrs  Gender: Female  [place] [facility] [place]: Tricuspid Valve Replacement  Ordering Physician: TIFFANIE DUMONT  Performed By: Yaz Kaplan RDCS     BSA: 1.7 m2  Height: 63 in  Weight: 158 lb  ______________________________________________________________________________  Procedure  Limited Portable Echo Adult.  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  TV mean gradient 5mmHg. Heart rate 60BPM. No TR.  No pericardial effusion is present.  ______________________________________________________________________________  Left Ventricle  S/P Right minithoracotomy tricuspid valve replacement with 31mm St. Mert EPIC  porcine bioprosthetic valve, PFO closure on 2023. Global and regional  left ventricular function is normal with an EF of 60-65%.     Right Ventricle  Right ventricular function, chamber size, wall motion, and thickness are  normal.     Tricuspid Valve  TV mean gradient 5mmHg. Heart rate 60BPM. No TR.     Vessels  The inferior vena cava is normal.     Pericardium  No pericardial effusion is present.  ______________________________________________________________________________  MMode/2D Measurements & Calculations  IVSd: 0.75 cm  LVIDd: 4.6 cm  LVIDs: 2.6 cm  LVPWd: 0.84 cm  FS: 44.4 %  LV mass(C)d: 117.8 grams  LV mass(C)dI: 67.4 grams/m2  RWT: 0.36     Doppler Measurements &  Calculations  TV V2 max: 138.6 cm/sec  TV max P.1 mmHg  TV mean P.0 mmHg  TV V2 VTI: 39.1 cm     ______________________________________________________________________________  Report approved by: Nestor Parra 2023 12:29 PM         Echo Limited     Value    LVEF  55-60%    Narrative    [id number]  [id number]  [id number]  [id number]^SHILPI^KEISHA^DANIEL     [facility]  Echocardiography Laboratory  [address] [place] [id number]     Name: CEM MORRIS  MRN: [id number]  : 1940  Study Date: 2023 08:15 AM  Age: 83 yrs  Gender: Female  Patient Location: [facility]  Reason For Study: Cardiac Arrest  Ordering Physician: KEISHA DOBBINS  Performed By: Yaz Kaplan RDCS     BSA: 1.8 m2  Height: 63 in  Weight: 159 lb  BP: 104/50 mmHg  ______________________________________________________________________________  Procedure  Limited Portable Echo Adult.  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 Valve is well seated  with mean gradient of 5mmHg at 74bpm.  Global and regional left ventricular function is normal with an EF of 55-60%.  Global right ventricular function is mildly reduced.     On direct visual comparison with study from 23, there is no significant  difference in ventricular function or tricuspid valve doppler assessment.  ______________________________________________________________________________  Left Ventricle  Global and regional left ventricular function is normal with an EF of 55-60%.     Right Ventricle  Global right ventricular function is mildly reduced. Right ventricular free  wall dyskinesia.     Mitral Valve  The mitral valve is normal. Trace mitral insufficiency is present.     Aortic Valve  Trileaflet aortic sclerosis  without stenosis.     Tricuspid Valve  Trace tricuspid insufficiency is present. A bioprosthetic tricuspid valve is  present. Mean gradient of 5mmHg at 74bpm.     Pulmonic Valve  The pulmonic valve is normal. Trace pulmonic insufficiency is present.     Vessels  Unable to assess mean RA pressure given the patient is on a ventilator.     Pericardium  No pericardial effusion is present.     Compared to Previous Study  This study was compared with the study from 23 .     ______________________________________________________________________________  Doppler Measurements & Calculations  TV V2 max: 114.8 cm/sec  TV max P.3 mmHg  TV mean PG: 3.6 mmHg  TV V2 VTI: 40.4 cm     ______________________________________________________________________________  Report approved by: Nestor Gray 2023 09:50 AM         Cardiac Device Check - Inpatient     Value    Date Time Interrogation Session [id number]    Implantable Pulse Generator  Medtronic    Implantable Pulse Generator Model [id number] Micra VR TCP    Implantable Pulse Generator Serial Number [id number]    Type Interrogation Session In Clinic    Clinic Name [facility]    Implantable Pulse Generator Type Pacemaker    Implantable Pulse Generator Implant Date 2023    Festus Setting Mode (NBG Code) VOO    Festus Setting Lower Rate Limit 80    Festus Setting Hysterisis Rate DISABLED    Lead Channel Setting Sensing Polarity Bipolar    Lead Channel Setting Sensing Anode Location Right Ventricle    Lead Channel Setting Sensing Anode Terminal Ring    Lead Channel Setting Sensing Cathode Location Right Ventricle    Lead Channel Setting Sensing Cathode Terminal Tip    Lead Channel Setting Pacing Polarity Bipolar    Lead Channel Setting Pacing Anode Location Right Ventricle    Lead Channel Setting Pacing Anode Terminal Ring    Lead Channel Setting Sensing Cathode Location Right Ventricle    Lead Channel Setting Sensing  Cathode Terminal Tip    Lead Channel Setting Pacing Pulse Width 0.24    Lead Channel Setting Pacing Amplitude 3    Zone Setting Type Category VF    Zone Setting Vendor Type Category V High Rate    Zone Setting Type Category VT    Zone Setting Vendor Type Category FastVT    Zone Setting Type Category VT    Zone Setting Vendor Type Category VT    Zone Setting Type Category VT    Zone Setting Vendor Type Category MonVT    Zone Setting Type Category ATRIAL_FIBRILLATION    Zone Setting Vendor Type Category FastATAF    Zone Setting Type Category AT/AF    Lead Channel Impedance Value 500    Lead Channel Pacing Threshold Amplitude 1.875    Lead Channel Pacing Threshold Pulse Width 0.24    Battery Date Time of Measurements [id number]    Battery Status OK    Battery RRT Trigger 2.5579    Battery Remaining Longevity 50    Battery Voltage 3.00    Festus Statistic Date Time Start [id number]    Festus Statistic Date Time End [id number]    Festus Statistic RV Percent Paced 99.96    Narrative    Patient seen on [facility]'s 3C for evaluation and iterative programming of   their pacemaker per MD orders. Pre-op TVR.    Device: Medtronic [id number] Micra VR TCP  Normal device function.   Mode: VOO 80 bpm  : 100%  Intrinsic Rhythm:  @ 30 bpm. No ventricular escape rhythm  Short V-V intervals:   Lead Trends Appear Stable.   Estimated battery longevity to RRT = 5.4 years. Battery voltage = 3 V.    Setting Changes: VVIR  bpm to VOO 80 bpm    Plan: Page pacemaker nurse for post-op programming..  ESA Monae RN    Leadless pacemaker    I have reviewed and interpreted the device interrogation, settings,   programming and nurse's summary. The device is functioning within normal   device parameters. I agree with the current findings, assessment and plan.   Cardiac Device Check - Inpatient     Value    Date Time Interrogation Session [id number]    Implantable Pulse Generator  Medtronic    Implantable Pulse Generator  Model [id number] Micra VR TCP    Implantable Pulse Generator Serial Number [id number]    Type Interrogation Session In Clinic    Clinic Name [facility]    Implantable Pulse Generator Type Pacemaker    Implantable Pulse Generator Implant Date 20230731    Festus Setting Mode (NBG Code) VVIR    Festus Setting Lower Rate Limit 80    Festus Setting Maximum Sensor Rate 120    Festus Setting Hysterisis Rate DISABLED    Lead Channel Setting Sensing Polarity Bipolar    Lead Channel Setting Sensing Anode Location Right Ventricle    Lead Channel Setting Sensing Anode Terminal Ring    Lead Channel Setting Sensing Cathode Location Right Ventricle    Lead Channel Setting Sensing Cathode Terminal Tip    Lead Channel Setting Sensing Sensitivity 2    Lead Channel Setting Pacing Polarity Bipolar    Lead Channel Setting Pacing Anode Location Right Ventricle    Lead Channel Setting Pacing Anode Terminal Ring    Lead Channel Setting Sensing Cathode Location Right Ventricle    Lead Channel Setting Sensing Cathode Terminal Tip    Lead Channel Setting Pacing Pulse Width 0.24    Lead Channel Setting Pacing Amplitude 3    Lead Channel Setting Pacing Capture Mode Adaptive    Zone Setting Type Category VF    Zone Setting Vendor Type Category V High Rate    Zone Setting Type Category VT    Zone Setting Vendor Type Category FastVT    Zone Setting Type Category VT    Zone Setting Vendor Type Category VT    Zone Setting Type Category VT    Zone Setting Vendor Type Category MonVT    Zone Setting Type Category ATRIAL_FIBRILLATION    Zone Setting Vendor Type Category FastATAF    Zone Setting Type Category AT/AF    Zone Setting Type Category BRADYCARDIA    Lead Channel Impedance Value 470    Lead Channel Sensing Intrinsic Amplitude 14.963    Lead Channel Pacing Threshold Amplitude 1.63    Lead Channel Pacing Threshold Pulse Width 0.24    Battery Date Time of Measurements [id number]    Battery Status OK    Battery RRT Trigger  2.5579    Battery Remaining Longevity 54    Battery Voltage 2.99    Fsetus Statistic Date Time Start [id number]    Festus Statistic Date Time End [id number]    Festus Statistic RV Percent Paced 100    Narrative    Patient seen on 4A for evaluation and iterative programming of her Medtronic [id number] Micra VR TCP leadless pacemaker per MD orders.    Device: Medtronic [id number] Micra VR TCP  Normal Device Function  Mode: VOO 80 bpm  : 100%  Intrinsic Rhythm: ventricular rate ~ 46 bpm  Electrode impedance trend appears stable: yes  Estimated battery longevity to RRT = 4.6 years  Setting Changes: Pacing mode programmed from VOO to VVIR. When LRL decreased to 60 bpm patient's MAP dropped  requiring increase in pressors. LRL programmed back to 80 bpm per KRISTI Medeiros for CVTS orders.     Plan: Device follow-up every 3 months.  EMIL Maldonado RN    Leadless pacemaker    I have reviewed and interpreted the device interrogation, settings, programming and nurse's summary. The device is functioning within normal device parameters. I agree with the current findings, assessment and plan.   Cardiac Device Check - Inpatient     Value    Date Time Interrogation Session [id number]    Implantable Pulse Generator  Medtronic    Implantable Pulse Generator Model [id number] Micra VR TCP    Implantable Pulse Generator Serial Number [id number]    Type Interrogation Session In Clinic    Clinic Name [facility]    Implantable Pulse Generator Type Pacemaker    Implantable Pulse Generator Implant Date 20230731    Festus Setting Mode (NBG Code) VVIR    Festus Setting Lower Rate Limit 60    Festus Setting Maximum Sensor Rate 120    Festus Setting Hysterisis Rate DISABLED    Lead Channel Setting Sensing Polarity Bipolar    Lead Channel Setting Sensing Anode Location Right Ventricle    Lead Channel Setting Sensing Anode Terminal Ring    Lead Channel Setting Sensing Cathode Location Right Ventricle    Lead Channel  Setting Sensing Cathode Terminal Tip    Lead Channel Setting Sensing Sensitivity 2    Lead Channel Setting Pacing Polarity Bipolar    Lead Channel Setting Pacing Anode Location Right Ventricle    Lead Channel Setting Pacing Anode Terminal Ring    Lead Channel Setting Sensing Cathode Location Right Ventricle    Lead Channel Setting Sensing Cathode Terminal Tip    Lead Channel Setting Pacing Pulse Width 0.24    Lead Channel Setting Pacing Amplitude 2.625    Lead Channel Setting Pacing Capture Mode Adaptive    Zone Setting Type Category VF    Zone Setting Vendor Type Category V High Rate    Zone Setting Type Category VT    Zone Setting Vendor Type Category FastVT    Zone Setting Type Category VT    Zone Setting Vendor Type Category VT    Zone Setting Type Category VT    Zone Setting Vendor Type Category MonVT    Zone Setting Type Category ATRIAL_FIBRILLATION    Zone Setting Vendor Type Category FastATAF    Zone Setting Type Category AT/AF    Lead Channel Impedance Value 430    Lead Channel Sensing Intrinsic Amplitude 15    Lead Channel Pacing Threshold Amplitude 1.63    Lead Channel Pacing Threshold Pulse Width 0.24    Battery Date Time of Measurements [id number]    Battery Status Middle of Service    Battery RRT Trigger 2.5579    Battery Remaining Longevity 75    Battery Voltage 2.99    Festus Statistic Date Time Start [id number]    Festus Statistic Date Time End [id number]    Festus Statistic RV Percent Paced 99.84    Narrative    Patient seen in [facility] 4[facility] for evaluation and iterative programming of a   Medtronic Micra leadless pacemaker per MD orders.    Device: Medtronic [id number] Micra VR TCP  Normal Device Function  Mode: VVIR  bpm  : 99.8%  Intrinsic Rhythm:  30 bpm  Short V-V intervals: 0  Electrode impedance trend appears stable  Estimated battery longevity to RRT = 5.2 years./Battery voltage = 2.99 V.   Setting Changes: Settings returned to pre-admission values; LRL decreased   from 80 bpm to  60 bpm.    Plan: Page device RN as needed while inpatient.   BALJINDER Jaramillo RN    Leadless pacemaker    I have reviewed and interpreted the device interrogation, settings,   programming and nurse's summary. The device is functioning within normal   device parameters. I agree with the current findings, assessment and plan.         Time Spent on this Encounter   I, Quin Langford DO, personally saw the patient today and spent greater than 30 minutes discharging this patient. Patient's care plan was discussed in great detail with CVICU attending, Dr. Chavira, who agrees with the plan.     We appreciate the opportunity to care for your patient while in the hospital.  Should you have any questions about your patient's stay in the ICU or this discharge summary our contact information is below.      Quin Langford DO   Anesthesiology Resident (PGY2/CA1)  CVICU Service

## 2023-12-15 NOTE — PROGRESS NOTES
CV ICU PROGRESS NOTE  12/15/2023          ASSESSMENT: Priya Funez is a 83 year old female with a PMH of HFpEF, severe TR, permanent AF s/p AVN ablation with PPM implant 1/11/19 s/p extraction TV (transvenous) PPM and implant leadless PPM 7/31/23, emergent aortic dissection repair 1/4/19, HTN, CKD, NEDA, and obesity who presented as an outpatient for elective TVR. Patient is now s/p mini TVR on 11/28/2023 with Dr. Mabry. Transferred to floor 12/2. RRT called 12/6 for worsening hypoxia and resp distress; subsequently had respiratory arrest with short CPR prior to ROSC. Intubated and transferred back to ICU.  Now s/p tracheostomy 12/13.     CO-MORBIDITIES:   S/P TVR (tricuspid valve replacement)  (primary encounter diagnosis)  Complete heart block (H)  Displacement of atrial pacemaker leads, initial encounter    Today's Progress:  - SLP: NPO, silvia for ice chips and video swallow Monday   - Start Warfarin today   - Remove A line   - Increase FWF back to 50mL/HR   - Will follow NIBP and A-line for correlation with plan to remove a-line later today   - Continue Midodrine Q8H and PRN   - Consider Diuresis later today   - Possible LTAC today     PLAN:  Neuro/ pain/ sedation:  - Monitor neurological status. Notify the MD for any acute changes in exam.  #Acute postoperative pain  - PRN: Tylenol, oxycodone  #Sedation  - Scheduled Melatonin HS   #Hx of general anxiety disorder  - PTA Lexapro and Remeron     Pulmonary care:   # s/p tracheostomy 12/13/23  # s/p Mechanical ventilation  #Acute respiratory failure and arrest on 12/6/23   #Acute hypercapnic and hypoxic respiratory failure requiring BiPAP  #Aspiration pneumonitis vs pneumonia  #Possible aspiration event 12/1  #Near complete plugging of left lower lobe on CT   #Acute mildly displaced and nondisplaced right 3-5 rib fractures with associated right chest wall hematoma and nondisplaced anterolateral left fourth and fifth rib fractures  - RRT called 12/6 for  worsening hypoxia and resp distress; subsequently had respiratory arrest with short CPR prior to ROSC. Intubated and transferred back to ICU.   - Tracheostomy   Vent Mode: Trach collar  FiO2 (%): 30 %  PEEP (cm H2O): 5 cmH2O  Pressure Support (cm H2O): 7 cmH2O  Resp: 25  - Mucomyst, duoneb w/ RT Q4H   - Bronch 12/7 (+) Candida albicans  - Trach dome as much as tolerated   - RT ventilation wean  - SLP speaking valve as tolerated when trach dome  - LTAC preference is [facility] per family   - VBG PRN for CO2 retention concern      Cardiovascular:    # S/p respiratory arrest 12/6  # Hx of severe TR s/p TVR  # HFpEF  # Hx of Afib s/p AVN ablation - PPM, upgraded to leadless 7/2023  # Hypertension  # Hyperlipidemia  # Hx of aortic dissection repair 2019  # Respiratory arrest 12/6   # Elevated BNP, improving   Stable paced rhythm with underlying atrial flutter. On review of cardiology, underlying aflutter/afib has been present since at least June of this year.   Pre-op 9/8/23 echo: LVEF 55/60%, mildly dilated RV, normal function  Echo 12/4: LVEF 60-65%, normal RV function, TV mean gradient 5 mmHg, no TR  - Echo 12/7 with no significant change from previous with the exception of mild reduction of RV. RV free wall dyskinesia   - Device RN adjusted PPM to PTA settings rate 60   - ASA 81 mg daily  - Midodrine 10 mg Q8H and PRNs to keep SBP > 100   - Hold PTA spironolactone 25 mg daily  - Start Warfarin today (INR goal 2-3)   - Remove A-line     GI care / Nutrition:   # Multiple loose stools   # Hx of C. Difficile (9/2023)  # Nausea, resolved  - NJT and enteral feeds at goal (45 mL/hr)   - SLP: NPO, okay for ice chips, plan for swallow study on Monday   - PPI famotidine  - Bowel regimen discontinued due to continued loose stools  - Finished vancomycin po 7 day course   - Continue Banatrol  - PRN Zofran  4 mg PO or IV Q6H  - PRN Compazine 5 mg IV Q6H   - PRN imodium QID     Renal / Fluids / Electrolytes:   #Hypernatremia    #Hypokalemia, resolved   #CKD stage 3  #Lactic acidosis, resolved  #IMAN on CKD, resolved   BL creat ~1.2-1.3   FLUID STATUS: Pre-op weight 70.9 kg, current weight 74.1 kg (75 kg yesterday)   - HOLD PTA spironolactone 25 mg daily  - HOLD PTA torsemide   - Elevated sodium, Na 151 today, likely 2/2 aggressive diureses S/p bumex and chlorotiazide.   - Free water flushes to 45 mL Q1Hr    - I/O inaccurate due to unmeasured stools and voids   - Hold further diuresis today   - Replete lytes per protocol  - Avoid/limit nephrotoxins as able  - Strict I/O, daily weights, avoid/limit nephrotoxins     Endocrine:    #Stress induced hyperglycemia  #T2DM  Hgb A1c 5.7%  - Medium Sliding scale insulin   - PTA Jardiance on hold, resume once medically optimized   - Goal BG <180 for optimal healing     ID / Antibiotics:  #Stress induced leukocytosis  # Probable aspiration pneumonitis vs pneumonia, resolved  #Hx of recent C. Difficile  - 11/30 Zosyn 7-day course for pneumonia finished   - 12/6 Given further decompensation, Vanco and meropenem was started on 12/6   - Finished meropenem 7 day course (12/12)   - Finished Vancomycin PO 7 day course for c-diff (12/13)  - Blood culture with NGTD   - Urine with Candida and sputum with Yeast, likely both colonizations.   - Urine culture with NGTD  - Afebrile, WBC increased today, will continue to monitor for now  - C. Difficile toxin PCR positive 12/3 with negative antigen and negative toxin - per antimicrobial stewardship team, more likely to be colonization with recent history and no apparent treatment  - Continue enteric precautions per infection prevention protocol      Heme:     # Acute blood loss anemia  No s/sx active bleeding  - CBC   - Hgb goal > 7.0     MSK / Skin:  #Mini-thoracotomy  #Rib fractures  #Surgical incision  #Perineal irritation   - Mini-thoracotomy precautions  - Incisional cares per protocol  - Postop incision management protocol  - WOC consult for perineal irritation,  frequent loose stools and voids    - PT/OT/CR    Prophylaxis:    - VTE: SCD's, SQH, starting warfarin   - Bowel regimen: Famotidine    Lines/ tubes/ drains:  - Tracheostomy (12/13)  - PICC line   - Right radial arterial Line - remove today   - NJT    Disposition:  - LTAC    ICU Checklist  F - Feeding: NJ - TF @ goal   A - Analgesia: prns   S - Sedation: off   T - Thromboembolic prophylaxis: SQH, starting warfarin      H - Head of bed elevated: yes   U - Ulcer prophylaxis: famotidine  G - Glycemic control: sliding scale insulin   S - SBT-tolerating PS & trach dome as much      B - Bowel regimen- currently on hold, loose stools   I - Indwelling catheter- none, external   D - De-escalation of antibiotics: finished meropenem 12/12, finished vancomycin 12/13    Patient seen, findings and plan discussed with CVICU staff and cardiothoracic surgeons.    Quin Langford DO   Anesthesiology Resident   PGY2/CA1    ICU *[id number]    ====================================    TODAY'S PROGRESS  SUBJECTIVE  Patient complains of leg swelling. Otherwise no complaints of pain. No N/V. Would like to get walking today.     OBJECTIVE  1. VITAL SIGNS  Temp:  [98  F (36.7  C)-98.4  F (36.9  C)] 98.1  F (36.7  C)  Pulse:  [] 72  Resp:  [13-32] 25  BP: (116-136)/(54) 116/54  MAP:  [55 mmHg-111 mmHg] 77 mmHg  Arterial Line BP: ()/(38-76) 129/50  FiO2 (%):  [30 %] 30 %  SpO2:  [96 %-100 %] 97 %  Vent Mode: Trach collar  FiO2 (%): 30 %  PEEP (cm H2O): 5 cmH2O  Pressure Support (cm H2O): 7 cmH2O  Resp: 25      2. INTAKE/ OUTPUT  I/O last 3 completed shifts:  In: 1651 [I.V.:36; NG/GT:655]  Out: 900 [Urine:550; Other:350]    3. PHYSICAL EXAMINATION   General: Awake lying in bed. Follows commands on all extremities alert and oriented, writes on paper to communicate  Neuro: A&O, follows commands, answers questions by writing on paper  Resp: breath sounds clear bilaterally, trach on PS   CV: V-paced around 60, extremities well perfused and  "DP intact.   Abdomen: Soft, non-distended, non-tender  Incisions: thoracotomy incision is cdi   Extremities: warm and well perfused. 1-2+ pitting edema BLE.     4. INVESTIGATIONS  Arterial Blood Gases   Recent Labs   Lab 12/14/23  0745 12/13/23  1721 12/13/23 0405 12/12/23  0452   PH 7.45 7.44 7.44 7.49*   PCO2 44 46* 48* 42   PO2 95 97 112* 91   HCO3 31* 31* 32* 32*     Complete Blood Count   Recent Labs   Lab 12/15/23  0500 12/14/23  0320 12/13/23 1722 12/13/23  0406   WBC 11.4* 12.3* 10.7 16.9*   HGB 8.8* 8.6* 8.9* 8.7*    271 272 367     Basic Metabolic Panel  Recent Labs   Lab 12/15/23  1144 12/15/23  0817 12/15/23  0508 12/15/23  0500 12/14/23  1958 12/14/23  1608 12/14/23 0324 12/14/23 0320 12/13/23 2051 12/13/23  1722   NA  --   --   --  151*  --  147*  --  145  --  145   POTASSIUM  --   --   --  4.0  --  4.1  --  4.3  --  4.6   CHLORIDE  --   --   --  110*  --  107  --  107  --  109*   CO2  --   --   --  29  --  29  --  29  --  28   BUN  --   --   --  43.0*  --  45.5*  --  43.0*  --  44.9*   CR  --   --   --  0.89  --  0.90  --  0.88  --  0.89   * 136* 100* 102*   < > 140*  145*   < > 112*   < > 128*    < > = values in this interval not displayed.     Liver Function Tests  Recent Labs   Lab 12/15/23  1132 12/15/23  0500 12/14/23  0320 12/13/23  0406 12/12/23  0450   AST  --  31 31 50* 48*   ALT  --  36 39 50 47   ALKPHOS  --  110 98 95 95   BILITOTAL  --  0.4 0.4 0.4 0.3   ALBUMIN  --  3.2* 3.3* 3.1* 3.2*   INR 1.03 1.06 1.07 1.10 1.15     Pancreatic Enzymes  No lab results found in last 7 days.  Coagulation Profile  Recent Labs   Lab 12/15/23  1132 12/15/23  0500 12/14/23  0320 12/13/23  0406   INR 1.03 1.06 1.07 1.10     Lactate  Invalid input(s): \"LACTATE\"    5. RADIOLOGY  No results found for this or any previous visit (from the past 24 hour(s)).         "

## 2023-12-16 PROBLEM — Z95.4 S/P TVR (TRICUSPID VALVE REPLACEMENT): Status: RESOLVED | Noted: 2023-01-01 | Resolved: 2023-01-01

## 2023-12-16 PROBLEM — Z95.4 S/P TRICUSPID VALVE REPLACEMENT: Status: ACTIVE | Noted: 2023-01-01

## 2023-12-16 PROBLEM — Z95.4 S/P TVR (TRICUSPID VALVE REPLACEMENT): Status: ACTIVE | Noted: 2023-01-01

## 2023-12-16 PROBLEM — Z94.4 LIVER TRANSPLANT RECIPIENT (H): Status: RESOLVED | Noted: 2023-01-01 | Resolved: 2023-01-01

## 2023-12-16 NOTE — PLAN OF CARE
Problem: Pain Acute  Goal: Optimal Pain Control and Function  Outcome: Progressing     Problem: Artificial Airway  Goal: Optimal Device Function  Outcome: Progressing     Pt arrived to unit this evening from another facility.  She is alert and oriented x 4.  She is unable to speak but is able to write down any questions, comments, or requests.  Initially on trach dome when she arrived, her oxygen saturation was 99%-100%.  At bedtime she was placed on mechanical ventilation and had oxygen saturation of 97%-98%.  She denied having any pain this shift.

## 2023-12-16 NOTE — PLAN OF CARE
Problem: Artificial Airway  Goal: Effective Communication  Outcome: Progressing  Goal: Optimal Device Function  Outcome: Progressing  Goal: Absence of Device-Related Skin or Tissue Injury  Outcome: Progressing       RT PROGRESS NOTE     DATA:     CURRENT SETTINGS: AC/18/390/+5             TRACH TYPE / SIZE:  #6 Flip TG 12/13/23             MODE:   AC             FIO2:   30     ACTION:             THERAPIES:   ALB QID/MUCOMYST QID             SUCTION:                           FREQUENCY:   X2                        AMOUNT:   Moderate to small                        CONSISTENCY:   Thick                        COLOR:   Pale/yellow             SPONTANEOUS COUGH EFFORT/STRENGTH OF EFFORT (not elicited by suctioning): Yes:strong                              WEANING PHASE:   #2                        WEAN MODE:    35% and 40 lpm tm with cuff down days and full vent at night                        WEAN TIME:   2 hrs and 11 min                        END WEAN REASON:   Per order     RESPONSE:             BS:   Clear/diminish             VITAL SIGNS:   SAT %, HR 59-62, RR 18-26             EMOTIONAL NEEDS / CONCERNS:  N/A                RISK FOR SELF DECANNULATION:  N/A                        RISK DUE TO:                          INTERVENTION/S IN PLACE IS/ARE:  N/A       NOTE / PLAN:   Pt admit today around 20:18 pm. Pt come with  TM with cuff down  and was placed 35% and 40 lpm tm, marbella well. Pt now is on full vent support, marbella well. Cont weaning on tm days and full vent at night and keep sat >/=92%

## 2023-12-16 NOTE — PROGRESS NOTES
ICU End of Shift Summary. See flowsheets for vital signs and detailed assessment.    Changes this shift: A & Ox4, trach dome from 0900- to end of shift and tolerating well. Wore PSMV  for short periods of time (less then 5 minutes at a time) and tolerated well. 30% FiO2 trach dome. ENT changed trach ties. Sutures in place. Suctioning out small amount of cloudy secretions. Vitally stable, paced.    Continues to have loose stools, imodium given x2.    Plan:  Transfer to LTACH pending . Gave report to LTACH KIKE Galan. Monitor trach site closely- sutures in place       Daughter Susie brought home pts purse, clothing and shoes.

## 2023-12-16 NOTE — PROGRESS NOTES
"   12/16/23 1100   Appointment Info   Signing Clinician's Name / Credentials (PT) Venita Caceres, PT, DPT   Rehab Comments (PT) Trach dome 30% 40LPM   Living Environment   People in Home spouse   Current Living Arrangements house   Home Accessibility stairs to enter home;stairs within home   Number of Stairs, Main Entrance 2   Stair Railings, Main Entrance railing on right side (ascending)   Number of Stairs, Within Home, Primary greater than 10 stairs   Stair Railings, Within Home, Primary railing on right side (ascending)   Transportation Anticipated car, drives self;family or friend will provide   Living Environment Comments Patient lives in a house with her , Darek. All needs met on main level. Patient's  can asssist with laundry in the basement.   Self-Care   Usual Activity Tolerance good   Current Activity Tolerance fair   Regular Exercise Yes   Activity/Exercise Type walking   Exercise Amount/Frequency 3-5 times/wk   Equipment Currently Used at Home walker, rolling  (4WW)   Fall history within last six months no  (Patient reports she has never fallen.)   Activity/Exercise/Self-Care Comment Patient was independent with mobility prior to hospitalization. Patient has multiple 4WWs.   General Information   Onset of Illness/Injury or Date of Surgery 12/15/23   Referring Physician Trevin Kline MD   Patient/Family Therapy Goals Statement (PT) Return home and return to PLOF   Pertinent History of Current Problem (include personal factors and/or comorbidities that impact the POC) Per chart, \"Priya Funez is a 83 year old female with a PMH of HFpEF, severe TR, permanent AF s/p AVN ablation with PPM implant 1/11/19 s/p extraction TV (transvenous) PPM and implant leadless PPM 7/31/23, emergent aortic dissection repair 1/4/19, HTN, CKD, NEDA, and obesity who underwent elective TVR on 11/28/2023.\"   Existing Precautions/Restrictions fall;oxygen therapy device and L/min  (Trach dome 30% 40LPM) "   General Observations Patient is very motivated to participate in physical therapy.   Cognition   Cognitive Status Comments Patient communicates well in writing.   Posture    Posture Forward head position;Protracted shoulders;Kyphosis   Range of Motion (ROM)   Range of Motion ROM is WFL   Strength (Manual Muscle Testing)   Strength (Manual Muscle Testing) Deficits observed during functional mobility   Bed Mobility   Bed Mobility supine-sit   Supine-Sit [place] (Bed Mobility) set up;supervision;1 person to manage equipment   Impairments Contributing to Impaired Bed Mobility decreased strength   Assistive Device (Bed Mobility) bed rails   Transfers   Transfers sit-stand transfer   Impairments Contributing to Impaired Transfers decreased strength   Sit-Stand Transfer   Sit-Stand [place] (Transfers) set up;supervision;1 person to manage equipment   Assistive Device (Sit-Stand Transfers) walker, 4-wheeled   Gait/Stairs (Locomotion)   [place] Level (Gait) set up;supervision;1 person to manage equipment   Assistive Device (Gait) walker, 4-wheeled   Distance in Feet (Gait) 5'   Pattern (Gait) step-through   Deviations/Abnormal Patterns (Gait) jim decreased   Comment, (Gait/Stairs) Stairs not tested due to fatigue.   Balance   Balance Comments Patient requires use of 4WW for ambulation at baseline.   Clinical Impression   Criteria for Skilled Therapeutic Intervention Yes, treatment indicated   PT Diagnosis (PT) Impaired functional mobility   Influenced by the following impairments Deconditioning, fatigue, weakness   Functional limitations due to impairments Bed mobility, transfers, gait, stairs   Clinical Presentation (PT Evaluation Complexity) evolving   Clinical Presentation Rationale Patient presents as medically diagnosed.   Clinical Decision Making (Complexity) moderate complexity   Planned Therapy Interventions (PT) balance training;bed mobility training;gait training;home exercise  "program;patient/family education;stair training;strengthening;transfer training;progressive activity/exercise;home program guidelines   Risk & Benefits of therapy have been explained evaluation/treatment results reviewed;care plan/treatment goals reviewed;patient   PT Total Evaluation Time   PT Eval, Moderate Complexity Minutes (74232) 20   Physical Therapy Goals   PT Frequency 6x/week   PT Predicted Duration/Target Date for Goal Attainment 01/27/24   PT Goals Bed Mobility;Transfers;Gait;Stairs   PT: Bed Mobility Independent;Supine to/from sit   PT: Transfers Modified independent;Sit to/from stand;Assistive device  (4WW)   PT: Gait Modified independent;Assistive device;150 feet  (4WW)   PT: Stairs Supervision/stand-by assist;2 stairs;Rail on right   Therapeutic Activity   Therapeutic Activities: dynamic activities to improve functional performance Minutes (57925) 40   Symptoms Noted During/After Treatment Fatigue   Treatment Detail/Skilled Intervention Patient had been incontinent of stool prior to PT evaluation. Sit<>stand and bed>commode transfers with CGA, no AD. Assist with clean up, linen change, and pericares. Increased time needed on commode. \"I could sit here all day!\" Patient was able to stand with SBA and 4WW multiple times to complete pericares. RT assisted with setting up patient on portable oxygen. Patient was then able to ambulate around the room with CGA and 4WW. Assist to manage lines and equipment. Patient requested to sit in recliner while family visited. All needs in reach.   PT Discharge Planning   PT Plan Transfers and gait with 4WW, stairs, activity tolerance, LE strengthening   PT Discharge Recommendation (DC Rec) home with assist;home with home care physical therapy   PT Rationale for DC Rec Patient is mobilizing with CGA-SBA. Anticipate patient will progress to discharge home with assist from spouse and home PT for continued endurance, mobility, and strength training.   PT Brief overview of " current status CGA-SBA for transfers and gait with 4WW. Session limited by loose stools this date, however patient is very motivated.   PT Equipment Needed at Discharge walker, rolling  (4WW)   Total Session Time   Timed Code Treatment Minutes 40   Total Session Time (sum of timed and untimed services) 60   Post Acute Settings Only   What unit is patient on? LTACH   Roll Left and Right   Patient Performance Independent   Sit to Lying   Patient Performance Supervision or touching assistance   Lying to Sitting on Side of Bed   Patient Performance Supervision or touching assistance   Sit to Stand   Patient Performance Supervision or touching assistance   Discharge Goal (Admit only) Independent   Chair/Bed to Chair Transfer   Patient Performance Supervision or touching assistance   Walk 10 feet   Patient Performance Supervision or touching assistance   Walk 50 feet with Two Turns   Reason if not Attempted Medical concerns   Walk 150 feet   Reason if not Attempted Medical concerns   Walk 10 Feet on Uneven Surfaces   Reason if not Attempted Medical concerns   Wheel 50 Feet With Two Turns   Reason if not Attempted Activity not applicable   Wheel 150 feet   Reason if not Attempted Activity not applicable   1 Step   Reason if not Attempted Medical concerns   4 Steps   Reason if not Attempted Medical concerns   12 Steps   Reason if not Attempted Medical concerns   Car Transfer   Reason if not Attempted Environmental limitations (e.g., lack of equipment,weather constraints)   Picking up objects   Reason if not Attempted Safety concerns

## 2023-12-16 NOTE — PHARMACY-ADMISSION MEDICATION HISTORY
"Pharmacy Note: LTACH Admission Drug Regimen Review    Initial admission medication history was completed at [facility]. Please see Pharmacy - Admission Medication History note from 12/02/2023.    Medication orders were signed & held for discharge from transferring facility? Yes - please review and release signed and held orders.    Admission drug regimen review has been completed. Pertinent information or medication issues identified include:    Discharge summary mentions \"Per CVTS no heparin bridge needed at this time\" referring to when restarting warfain. Warfarin has been restarted 12/15 and the heparin is still ordered. Could discontinue heparin if appropriate. Please review when able.  Patient has not used scheduled sliding scale insulin in the last 3.5 days. Could potentially decrease frequency to every 6 hours? Patient was also on empagliflozin PTA and could transition back when able/appropriate.  Patient is on scheduled midodrine for hypotension. Could benefit from hold parameters for when not to give if blood pressure is too high    The following PTA medications were not continued during hospitalization at [facility]: Vitamin D, Vitamin B12, Empagliflozin, Metoprolol (held due to low BP), Spironolactone (held due to fluid status/electrolytes), Torsemide (held due to fluid status/electrolytes). Please assess whether a future restart would be appropriate.     Thank you for the opportunity to participate in the care of this patient.    Eros Post RPH  12/15/2023 8:22 PM    "

## 2023-12-16 NOTE — PHARMACY-ANTICOAGULATION SERVICE
Clinical Pharmacy - Warfarin Dosing Consult     Pharmacy has been consulted to manage this patient s warfarin therapy.  Indication: Atrial Fibrillation;Bioprosthetic Heart Valve  Therapy Goal: INR 2-3  Provider/Team: ISAURA Mckoy [facility]: [facility]  Warfarin Prior to Admission: Yes  Warfarin PTA Regimen: 2 mg on M, F & 3 mg ROW  Significant drug interactions: Aspirin / Heparin - increased risk of bleeding  Recent documented change in oral intake/nutrition: Unknown  Dose Comments: Dose already given today at [facility] prior to transfer    INR   Date Value Ref Range Status   12/15/2023 1.03 0.85 - 1.15 Final   12/15/2023 1.06 0.85 - 1.15 Final       Dose already given today at [facility] prior to transfer.  Pharmacy will monitor Priya Funez daily and order warfarin doses to achieve specified goal.      Please contact pharmacy as soon as possible if the warfarin needs to be held for a procedure or if the warfarin goals change.       Eros Post RPH on 12/15/2023 at 9:27 PM

## 2023-12-16 NOTE — PLAN OF CARE
Speech Language Therapy Discharge Summary    Reason for therapy discharge:    Discharged to LTACH    Progress towards therapy goal(s). See goals on Care Plan in [facility] electronic health record for goal details.  Goals not met.  Barriers to achieving goals:   discharge from facility.    Therapy recommendation(s):    Continued therapy is recommended.  Rationale/Recommendations:  Continued ST for PMSV tolerance and swallow eval.    Recommend pt continue trach dome with cuff deflated as tolerated. OK for PMSV use as tolerated with 1:1 supervision. Pt must be on trach dome with cuff deflated. Please remove PMSV if pt's SOB, fatigued, or sleeping. Recommend pt continue NPO with excellent oral cares. OK for sips of ice chips for comfort after oral cares. Pt would likely benefit from completion of video swallow study, however pt reported she is not quite ready for completion as she wants to take swallowing slow.

## 2023-12-16 NOTE — PLAN OF CARE
Physical Therapy Discharge Summary    Reason for therapy discharge:    Discharged to LTACH    Progress towards therapy goal(s). See goals on Care Plan in [facility] electronic health record for goal details.  Goals partially met.  Barriers to achieving goals:   discharge from facility.    Therapy recommendation(s):    Continued therapy is recommended.  Rationale/Recommendations:  strength, endurance.

## 2023-12-16 NOTE — PLAN OF CARE
"  Problem: Artificial Airway  Goal: Effective Communication  Outcome: Progressing  Goal: Optimal Device Function  Outcome: Progressing  Goal: Absence of Device-Related Skin or Tissue Injury  Outcome: Progressing  RT PROGRESS NOTE     DATA:     CURRENT SETTINGS: AC/18/390/+5             TRACH TYPE / SIZE:  #6 Flip TG 12/13/23             MODE:   AC             FIO2:   30%     ACTION:             THERAPIES:   ALB QID/MUCOMYST QID             SUCTION:                           FREQUENCY:   X2                        AMOUNT:  small                        CONSISTENCY:   Thick                        COLOR:   Pale/yellow             SPONTANEOUS COUGH EFFORT/STRENGTH OF EFFORT (not elicited by suctioning): Yes:strong                              WEANING PHASE:   #2                        WEAN MODE:    30% and 40 TM with cuff up days and full vent at night                        WEAN TIME:  Started TM 30%/40L in this morning @ 0946 AM.                         END WEAN REASON:   Ongoing      RESPONSE:             BS:   coarse              VITAL SIGNS:   Blood pressure 117/58, pulse 65, temperature 98.2  F (36.8  C), temperature source Tympanic, resp. rate 20, height 1.613 m (5' 3.5\"), weight 75 kg (165 lb 5.5 oz), SpO2 97%, not currently breastfeeding.              EMOTIONAL NEEDS / CONCERNS:  N/A                RISK FOR SELF DECANNULATION:  N/A                        RISK DUE TO:                          INTERVENTION/S IN PLACE IS/ARE:  N/A       NOTE / PLAN:  Patient has been weaning on TM with cuff up since this morning @ 0946 AM and is tolerating well. Plan: will place on full-vent support at [facility].                         "

## 2023-12-16 NOTE — PLAN OF CARE
Problem: Adult Inpatient Plan of Care  Goal: Optimal Comfort and Wellbeing  Outcome: Progressing  Intervention: Provide Person-Centered Care  Recent Flowsheet Documentation  Taken 12/16/2023 0015 by Darcy Bean RN  Trust Relationship/Rapport: care explained     Problem: Pain Acute  Goal: Optimal Pain Control and Function  Outcome: Progressing  Intervention: Optimize Psychosocial Wellbeing  Recent Flowsheet Documentation  Taken 12/16/2023 0015 by Darcy Bean RN  Supportive Measures: active listening utilized     Problem: Enteral Nutrition  Goal: Absence of Aspiration Signs and Symptoms  Outcome: Progressing  Intervention: Minimize Aspiration Risk  Recent Flowsheet Documentation  Taken 12/16/2023 0015 by Darcy Bean RN  Oral Care: (Cold tap water) swabbed with sterile water  Goal: Feeding Tolerance  Outcome: Progressing   Goal Outcome Evaluation:       Patient is very pleasant, able to write or lip read to communicate needs, on mechanical ventilation. Complained of headache through the night. Given tylenol with good effect. On continuous tube feeding, assisted with pivot transfer to [facility], had loose stools X 2, given prn imodium. Will continue to monitor.                  150

## 2023-12-16 NOTE — PROGRESS NOTES
HOSPITALIST HOLDING NOTE    Priya Funez is a 83 year old female with a PMH of HFpEF, severe TR, permanent AF s/p AVN ablation with PPM implant 1/11/19 s/p extraction TV (transvenous) PPM and implant leadless PPM 7/31/23, emergent aortic dissection repair 1/4/19, HTN, CKD, NEDA, and obesity who underwent elective TVR on 11/28/2023.    Immediately postoperatively the patient was admitted to the CVICU. Patient was extubated on POD #0 to 4 LPM.  Blood pressure and cardiac index were managed with vasopressors and inotropic agents which were continuously weaned until no longer needed. Chest tubes and temporary pacemaker wires were removed as appropriate. Patient required additional support of respiratory status including BiPAP for acute hypoxic and hypercapnic respiratory failure. She was started on Zosyn with radiologic evidence of probable pneumonia and also had an aspiration event on 12/1. Respiratory status improved, and patient was subsequently transferred to the surgical telemetry floor on 12/2/23, which she completed a 7 day course. The patient continued to progress. She continued to have dyspnea consistent with pneumonia and probable pulmonary edema. Respiratory status was supported with aggressive pulmonary hygiene and ongoing diuresis.      On 12/6/2023 she was transferred back to CVICU after RRT for acute hypoxic respiratory distress and subsequent respiratory arrest with short CPR, re-intubation, and ROSC achieved. She sustained mildly displaced and nondisplaced right 3-5 rib fractures with associated right chest wall hematoma and nondisplaced anterolateral left 4th and 5th rib fractures. She was treated with meropenem 7 day course. She also has hx of recent C diff and multiple loose stools, for which she completed a 7 day po vancomycin course (per antimicrobial stewardship team, more likely to be colonization with recent history and no apparent treatment). She remained intubated in the CVICU until  "undergoing tracheostomy by Dr. Pitts on 12/13/23. She has been progressing with trach dome and intermittently on PS.      The patient was fluid overloaded and treated with diuretics. She remains up from preoperative weight and ongoing diuresis was complicated by mild hypernatremia, which has been improving with free water flushes.      Patient was transiently hyperglycemic and treated with insulin infusion then transitioned to sliding scale insulin per protocol. Blood sugars remained stable.presented as an outpatient for elective TVR.        She is now transferred to [facility] for further management.  She arrives stable.  She is awake and alert.  Tolerating trach Dohn this evening.  Complains of loose stools.  Breathing comfortably.        Vital signs:  Temp: 98.6  F (37  C) Temp src: Oral BP: 124/57 Pulse: 61   Resp: 21 SpO2: 97 % O2 Device: Mechanical Ventilator Oxygen Delivery: 40 LPM Height: 161.3 cm (5' 3.5\") Weight: 73.5 kg (162 lb 0.6 oz)  Estimated body mass index is 28.25 kg/m  as calculated from the following:    Height as of this encounter: 1.613 m (5' 3.5\").    Weight as of this encounter: 73.5 kg (162 lb 0.6 oz).       Tracheostomy  Lungs clear bilaterally  Heart regular rate and rhythm  Abdomen soft nontender  Trace bilateral lower extremity edema  Neurologically grossly intact    Assessment/Plan    Continue ongoing cares that she was receiving at [facility].  Continue mechanical ventilation with ventilator weaning protocol.  Continue tube feeding.  Restarting anticoagulation with warfarin.  Pharmacy to manage.  Monitor blood sugars and cover with sliding scale.  Monitor for any recurrent hypotension and continue midodrine 10 mg every 8 hours scheduled and as needed.  Cardiac monitor.  Albuterol and Mucomyst nebs 4 times daily.  Physical, occupational and speech therapy consults.  Hospitalist to assume care in the morning.  She is full code.      Trevin Kline MD  Internal " Medicine

## 2023-12-16 NOTE — H&P
ACH    History and Physical - Hospitalist Service       Date of Admission:  12/15/2023    Assessment & Plan      #Acute respiratory failure and arrest on 12/6/23   #Acute hypercapnic and hypoxic respiratory failure requiring BiPAP  # s/p tracheostomy 12/13/23  # s/p Mechanical ventilation  #Aspiration pneumonitis vs pneumonia  #Possible aspiration event 12/1  #Near complete plugging of left lower lobe on CT   #Acute mildly displaced and nondisplaced right 3-5 rib fractures with associated right chest wall hematoma and nondisplaced anterolateral left fourth and fifth rib fractures  -Admit to LTACH 12/15/2023 to continue vent weaning, therapies  -Pulmonology consult for vent weaning process  -RT consult for tracheostomy cares, oxygenation, nebs, pulmonary therapies  - Tracheostomy sutures to be removed POD#5 (12/18/2023)  - Mucomyst, duoneb w/ RT Q4H   - Trach dome as much as tolerated   - Continue therapy with RT   - SLP speaking valve as tolerated      # S/p respiratory arrest 12/6  # Hx of severe TR s/p TVR  # HFpEF  # Hx of Afib s/p AVN ablation - PPM, upgraded to leadless 7/2023  # Hypertension  # Hyperlipidemia  # Hx of aortic dissection repair 2019  # Respiratory arrest 12/6   # Elevated BNP, improving   - Stable paced rhythm with underlying atrial flutter. On review of cardiology, underlying aflutter/afib has been present since at least June of this year. Device RN adjusted PPM to PTA settings rate 60 at [facility].   - Pre-op 9/8/23 echo: LVEF 55/60%, mildly dilated RV, normal function  - Echo 12/4 with LVEF 60-65%, normal RV function, TV mean gradient 5 mmHg, no TR  - Echo 12/7 with no significant change from previous with the exception of mild reduction of RV. RV free wall dyskinesia.   - ASA 81 mg daily  -Hold scheduled midodrine 10 mg Q8H since BP now in the 140s systolic and stable on 12/16  -Continue midodrine 5 mg TID PRN for SBP<100   - Held PTA spironolactone, torsemide, metoprolol due to hypotension at  prior hospital  - FLUID STATUS: Pre-op weight 70.9 kg, discharge weight 74.1 kg (163 lbs) on 12/15/2023 and 165 lbs on 12/16  -Gradually start diuretics, start torsemide 10 mg daily now since weight up, has 3+ edema peripheral edema (home dose torsemide 40 mg daily)  -Monitor volume status daily and gradually titrate diuretics as appropriate  - Started Warfarin 12/15.  Pharmacy consulted for Coumadin dosing (INR goal 2-3)   -Continue telemetry monitoring     # Multiple loose stools   # Hx of C. Difficile (9/2023)  # Severe malnutrition in the context of acute illness  - Nutrition consulted for continuing tube feedings  - NJT and enteral feeds at goal (45 mL/hr)   - SLP: NPO, okay for ice chips, swallow study per SLP  - PPI famotidine  - Bowel regimen discontinued due to continued loose stools  - Finished vancomycin po 7 day course   - Continue Banatrol  - PRN Zofran  4 mg PO or IV Q6H  - PRN Compazine 5 mg IV Q6H   - PRN Imodium QID      #Hypernatremia   #Hypokalemia, resolved   #CKD stage 3  #Lactic acidosis, resolved  #IMAN on CKD, resolved   Baseline creat ~1.2-1.3   FLUID STATUS: Pre-op weight 70.9 kg, discharge weight 74.1 kg (163 lbs).   - Held PTA spironolactone 25 mg daily  - Held PTA torsemide   - Elevated sodium, Na 151 --> 147 on recheck, likely 2/2 aggressive diureses S/p bumex and chlorotiazide prior to transfer  -On free water flushes @45 mL Q1H, changed to 200 mL every 4 hourly on 12/16  - I/O has been inaccurate due to unmeasured stools and voids   - Replete lytes per protocol  - Avoid/limit nephrotoxins as able  - Strict I/O, daily weights, avoid/limit nephrotoxins     #Stress induced hyperglycemia  #T2DM  Hgb A1c 5.7%  - Medium Sliding scale insulin   - PTA Jardiance on hold, resume once medically optimized   - Goal BG <180 for optimal healing     # Stress induced leukocytosis  # Probable aspiration pneumonitis vs pneumonia, resolved  - 11/30 Zosyn 7-day course for pneumonia (finished)   - 12/6  Given further decompensation, Vanco and meropenem was started on 12/6   - Finished meropenem 7 day course (12/12)   - Finished Vancomycin PO 7 day course (for c-diff) (12/13)  - Blood culture with NGTD   - Urine with Candida and sputum with Yeast, likely both colonizations.   - Urine culture with NGTD     #Hx of recent C. Difficile  - C. Difficile toxin PCR positive 12/3 with negative antigen and negative toxin - per antimicrobial stewardship team, more likely to be colonization with recent history and no apparent treatment  - Finished oral vancomycin course on 12/13  - Monitor fever curve, WBC, and inflammatory markers as appropriate  - Enteric precautions per infection prevention protocol      # 3rd, 4th right rib fractures due to CPR  -Continue mini thoracotomy precautions  -Judicious use of narcotics, de-escalate as able    # Acute blood loss anemia  - Hgb has been stable.   -Hgb 7.6 on 12/16   -Follow CBC.   -Transfuse if Hgb<7  -Check iron studies     #Acute postoperative pain  - PRN: Tylenol, oxycodone    #Hx of general anxiety disorder  - PTA Lexapro and Remeron  - Scheduled Melatonin HS     # Perineal irritation    - WOC consulted        Diet: Adult Formula Drip Feeding: Continuous Osmolite 1.5; Nasojejunal; Goal Rate: 45; mL/hr; Do not advance tube feeding rate unless K+ is = or > 3.0, Mg++ is = or > 1.5, and Phos is = or > 1.9    DVT Prophylaxis: Heparin SQ and Warfarin  Barrientos Catheter: Not present  Lines: PRESENT      PICC 12/07/23 Triple Lumen Right Basilic ok to use PICC-Site Assessment: WDL except;Ecchymotic      Cardiac Monitoring: ACTIVE order. Indication: Tachyarrhythmias, acute (48 hours)  Code Status: Full Code      Clinically Significant Risk Factors Present on Admission         # Hypernatremia: Highest Na = 151 mmol/L in last 2 days, will monitor as appropriate   # Hypercalcemia: corrected calcium is >10.1, will monitor as appropriate    # Hypoalbuminemia: Lowest albumin = 2.7 g/dL at  "12/16/2023  5:50 AM, will monitor as appropriate    # Drug Induced Coagulation Defect: home medication list includes an anticoagulant medication  # Drug Induced Platelet Defect: home medication list includes an antiplatelet medication   # Hypertension: Noted on problem list  # Chronic heart failure with preserved ejection fraction: heart failure noted on problem list and last echo with EF >50%     # Overweight: Estimated body mass index is 28.83 kg/m  as calculated from the following:    Height as of this encounter: 1.613 m (5' 3.5\").    Weight as of this encounter: 75 kg (165 lb 5.5 oz).    # Severe Malnutrition: based on nutrition assessment       # Financial/Environmental Concerns:     # Pacemaker present  # History of CABG: noted on surgical history       Disposition Plan      Expected Discharge Date: 12/21/2023                  MARITO LEPE MD  Hospitalist Service  LTACH  Securely message with GetBack (more info)  Text page via [facility] Paging/Directory     ______________________________________________________________________    Chief Complaint   Acute respiratory failure, tracheostomy, s/p mini TVR      History of Present Illness   Priya Funez is a 83 year old female with a PMHx of HFpEF, severe TR, permanent AF s/p AVN ablation with PPM implant 1/11/19 s/p extraction TV (transvenous) PPM and implant leadless PPM 7/31/23, emergent aortic dissection repair 1/4/19, HTN, CKD, NEDA, and obesity who presented to [facility] as an outpatient for elective TVR done on 11/28/2023.  Immediate postoperative course was complicated by acute hypoxic and hypercapnic respiratory failure requiring BiPAP and probable pneumonia and treated with 7 days of IV Zosyn, patient was transferred to surgical telemetry floor on 12/2/2020.  On 12/6/2023 she was transferred back to CVICU after RRT for acute hypoxic respiratory distress and subsequent respiratory arrest with short CPR, re-intubation, and ROSC achieved. She sustained mildly " displaced and nondisplaced right 3-5 rib fractures with associated right chest wall hematoma and nondisplaced anterolateral left 4th and 5th rib fractures. She was treated with meropenem 7 day course. She also has hx of recent C diff and multiple loose stools, for which she completed a 7 day po vancomycin course (per antimicrobial stewardship team, more likely to be colonization with recent history and no apparent treatment). Patient was transiently hyperglycemic and treated with insulin infusion then transitioned to sliding scale insulin per protocol. Blood sugars remained stable, has not required insulin coverage.  She remained intubated in the CVICU until undergoing tracheostomy by Dr. Pitts on 12/13/23, doing well with trach dome and PS currently.  She was treated for volume overload with diuretics (Bumex and chlorothiazide) with subsequent hypernatremia and hypotension requiring FWF and pressor support.  Spironolactone, torsemide and metoprolol discontinued and midodrine started with now stable blood pressures. Pre-op weight 70.9 kg, discharge weight 74.1 kg (163 lbs) on 12/15/2023.  Patient was discharged to [facility] 12/15/2023 for continuing vent weaning, therapies.      Past Medical History    Past Medical History:   Diagnosis Date    Benign essential hypertension     Cardiac pacemaker in situ     [employer]    Chronic atrial fibrillation (H)     s/p AV node ablation and PPM placement January, 2019    Chronic diastolic heart failure (H)     Chronic kidney disease, stage III (moderate) (H)     Chronic right-sided heart failure (H)     NEDA (generalized anxiety disorder)     History of aortic dissection     s/p emergent repair 2019    History of blood transfusion     no adverse reactions    S/P tricuspid valve replacement 11/28/2023    Severe tricuspid regurgitation        Past Surgical History   Past Surgical History:   Procedure Laterality Date    ANGIOGRAM Right 01/04/2019    Procedure: DIAGONSTIC ANGIOGRAM OF  SMA;  Surgeon: Rigo Jennings MD;  Location:  OR    BIOPSY BREAST      BYPASS GRAFT ARTERY CORONARY, REPAIR VALVE AORTIC, COMBINED N/A 01/04/2019    Procedure: AORTIC DISSECTION REPAIR WITH GRAFT- HEMASHIELD PLATINUM WOVEN DOUBLE VELOR 4 SIDE ARM VASCULAR GRAFT, D:35 X 12 X 10 X 10MM/ L:50CM;  Surgeon: Jose Winslow MD;  Location:  OR    CV CORONARY ANGIOGRAM N/A 08/29/2023    Procedure: Coronary Angiogram;  Surgeon: Ar Morales MD;  Location:  HEART CARDIAC CATH LAB    CV PCI N/A 08/29/2023    Procedure: Percutaneous Coronary Intervention;  Surgeon: Ar Morales MD;  Location:  HEART CARDIAC CATH LAB    CV RIGHT HEART CATH MEASUREMENTS RECORDED N/A 08/29/2023    Procedure: Right Heart Catheterization;  Surgeon: Ar Morales MD;  Location:  HEART CARDIAC CATH LAB    EP ABLATION AV NODE N/A 01/11/2019    Procedure: EP Ablation AV Node;  Surgeon: Tsering Davey MD;  Location:  HEART CARDIAC CATH LAB    EP LEAD EXTRACTION  07/31/2023    EP PACEMAKER Left 01/11/2019    Procedure: EP Pacemaker;  Surgeon: Tsering Davey MD;  Location:  HEART CARDIAC CATH LAB    EP PACEMAKER LEADLESS DEVICE IMPLANT  07/31/2023    EP PACEMAKER LEADLESS DEVICE IMPLANT N/A 07/31/2023    Procedure: Pacemaker Leadless Device Implant;  Surgeon: Gaston Car MD;  Location: [facility] OR    LARYNGOSCOPY, BRONCHOSCOPY, COMBINED N/A 12/13/2023    Procedure: flexible bronchoscopy;  Surgeon: Rita Pitts MD;  Location: [facility] OR    PICC INSERTION - TRIPLE LUMEN Right 12/07/2023    right basilic 5 fr tl power picc 40 cm    REPAIR PATENT FORAMEN OVALE N/A 11/28/2023    Procedure: patent foramen ovale closure;  Surgeon: Joyce Mabry MD;  Location: [facility] OR    REPAIR VALVE TRICUSPID MINIMALLY INVASIVE N/A 11/28/2023    Procedure: Right thoracotomy on cardiopulmonary bypass. Minimally Invasive Tricuspid Valve replacement using a 31mm Abbott Epic Plus Mitral Valve.  Intraoperative Transesophageal  Echocardiogram (ALEX) per Anesthesia.;  Surgeon: Joyce Mabry MD;  Location: [facility] OR    THYROID SURGERY      benign mass removed    TRACHEOSTOMY N/A 01/25/2019    Procedure: TRACHEOSTOMY  (DR MCCLELLAND);  Surgeon: Bryson Mcclelland MD;  Location: [facility] OR    TRACHEOSTOMY N/A 12/13/2023    Procedure: tracheotomy placement;  Surgeon: Rita Pitts MD;  Location: [facility] OR    TRANSESOPHAGEAL ECHOCARDIOGRAM INTRAOPERATIVE N/A 6/16/2023    Procedure: ECHOCARDIOGRAM,TRANSESOPHAGEAL,WITH ANESTHESIA;  Surgeon: Dennis Meier DO;  Location: [facility] OR    TRANSESOPHAGEAL ECHOCARDIOGRAM INTRAOPERATIVE N/A 09/08/2023    Procedure: ECHOCARDIOGRAM,TRANSESOPHAGEAL,WITH ANESTHESIA;  Surgeon: Rios Heredia MD;  Location: [facility] OR    TUBAL LIGATION         Prior to Admission Medications   Prior to Admission Medications   Prescriptions Last Dose Informant Patient Reported? Taking?   Acetaminophen 325 MG CAPS   Yes No   Sig: Take 325-650 mg by mouth every 4 hours as needed (Pain/Fever)   albuterol (PROAIR HFA/PROVENTIL HFA/VENTOLIN HFA) 108 (90 Base) MCG/ACT inhaler   No No   Sig: Inhale 2 puffs into the lungs every 6 hours as needed for shortness of breath / dyspnea or wheezing   aspirin (ASA) 81 MG EC tablet   Yes No   Sig: Take 81 mg by mouth daily   cholecalciferol 25 MCG (1000 UT) TABS   Yes No   Sig: Take 1,000 Units by mouth daily    cyanocobalamin (VITAMIN B-12) 1000 MCG tablet   Yes No   Sig: Take 1,000 mcg by mouth daily    empagliflozin (JARDIANCE) 10 MG TABS tablet   No No   Sig: Take 1 tablet (10 mg) by mouth daily   escitalopram (LEXAPRO) 10 MG tablet   No No   Sig: Take 1 tablet (10 mg) by mouth daily   metoprolol tartrate (LOPRESSOR) 25 MG tablet   No No   Sig: Take 1 tablet (25 mg) by mouth 2 times daily   mirtazapine (REMERON) 15 MG tablet   No No   Sig: Take 1 tablet (15 mg) by mouth At Bedtime   spironolactone (ALDACTONE) 25 MG tablet   No No   Sig: Take 1 tablet (25 mg) by mouth  daily   torsemide (DEMADEX) 10 MG tablet   No No   Sig: Take 4 tablets(40mg) by mouth every day   warfarin ANTICOAGULANT (COUMADIN) 2 MG tablet   No No   Sig: Take 2 to 4mg (1 to 2 tabs) by mouth daily OR AS DIRECTED.  Adjust dose based on INR.      Facility-Administered Medications: None        Review of Systems    The 10 point Review of Systems is negative other than noted in the HPI    Social History   I have reviewed this patient's social history and updated it with pertinent information if needed.  Social History     Tobacco Use    Smoking status: Never    Smokeless tobacco: Never   Vaping Use    Vaping Use: Never used   Substance Use Topics    Alcohol use: Never    Drug use: Never         Family History   I have reviewed this patient's family history and updated it with pertinent information if needed.  Family History   Problem Relation Age of Onset    Ataxia Mother     Heart Disease Father     Diabetes No family hx of     Myocardial Infarction No family hx of     Cerebrovascular Disease No family hx of     Coronary Artery Disease Early Onset No family hx of     Breast Cancer No family hx of     Colon Cancer No family hx of     Ovarian Cancer No family hx of     Anesthesia Reaction No family hx of     Deep Vein Thrombosis (DVT) No family hx of          Allergies   Allergies   Allergen Reactions    Bactrim [Sulfamethoxazole-Trimethoprim]      pancytopenia    Amoxicillin Swelling     Face and body    Ciprofloxacin Hives        Physical Exam   Vital Signs: Temp: 97.4  F (36.3  C) Temp src: Oral BP: 120/58 Pulse: 60   Resp: 20 SpO2: 98 % O2 Device: Trach dome Oxygen Delivery: 40 LPM  Weight: 165 lbs 5.52 oz  GENERAL: Alert, oriented, conversant, in no distress.   EYES: Normal conjunctiva. Sclera anicteric. Nonystagmus. Extraocular movements intact.   NECK: Supple, no lymph adenopathy. JVP is not distended.  Tracheostomy in place  NOSE: NJ tube in place  LUNGS: Clear to auscultation. No ronchi or crackles. Equal  air entry bilaterally.   HEART: S1 S2, Rate and rhythm is regular. No murmurs  ABDOMEN: Soft, nontender, no distension. Bowel sounds are positive. No guarding or rebound.   EXTREMITIES: 3+ pitting edema  SKIN: No rash or ulcers.   NEUROLOGIC: Alert and oriented x3.  Mouthing words, communicates by writing appropriately.  Clear mentation. Motor, sensory and cranial exam is grossly intact andsymmetric.   PSYCHIATRIC: Normal affect and cognition       Medical Decision Making       75 MINUTES SPENT BY ME on the date of service doing chart review, history, exam, documentation & further activities per the note.      Data     I have personally reviewed the following data over the past 24 hrs:    8.6  \   7.6 (L)   / 274     144 106 36.8 (H) /  107 (H)   4.0 34 (H) 0.79 \     ALT: 24 AST: 22 AP: 104 TBILI: 0.5   ALB: 2.7 (L) TOT PROTEIN: 4.8 (L) LIPASE: N/A     INR:  1.12 PTT:  N/A   D-dimer:  N/A Fibrinogen:  N/A       Imaging results reviewed over the past 24 hrs:   No results found for this or any previous visit (from the past 24 hour(s)).  Recent Labs   Lab 12/16/23  1208 12/16/23  0629 12/16/23  0550 12/15/23  1652 12/15/23  1538 12/15/23  1144 12/15/23  1132 12/15/23  0508 12/15/23  0500 12/14/23  0324 12/14/23  0320   WBC  --   --  8.6  --   --   --   --   --  11.4*  --  12.3*   HGB  --   --  7.6*  --   --   --   --   --  8.8*  --  8.6*   MCV  --   --  98  --   --   --   --   --  97  --  96   PLT  --   --  274  --   --   --   --   --  318  --  271   INR  --   --  1.12  --   --   --  1.03  --  1.06  --  1.07   NA  --   --  144  --  147*  147*  --   --   --  151*   < > 145   POTASSIUM  --   --  4.0  --  4.2  --   --   --  4.0   < > 4.3   CHLORIDE  --   --  106  --  106  --   --   --  110*   < > 107   CO2  --   --  34*  --  32*  --   --   --  29   < > 29   BUN  --   --  36.8*  --  42.1*  --   --   --  43.0*   < > 43.0*   CR  --   --  0.79  --  0.81  --   --   --  0.89   < > 0.88   ANIONGAP  --   --  4*  --  9  --   --    --  12   < > 9   BESS  --   --  8.1*  --  9.0  --   --   --  9.0   < > 8.9   * 104* 118*   < > 131*   < >  --    < > 102*   < > 112*   ALBUMIN  --   --  2.7*  --   --   --   --   --  3.2*  --  3.3*   PROTTOTAL  --   --  4.8*  --   --   --   --   --  5.7*  --  5.6*   BILITOTAL  --   --  0.5  --   --   --   --   --  0.4  --  0.4   ALKPHOS  --   --  104  --   --   --   --   --  110  --  98   ALT  --   --  24  --   --   --   --   --  36  --  39   AST  --   --  22  --   --   --   --   --  31  --  31    < > = values in this interval not displayed.       Most Recent 3 CBC's:  Recent Labs   Lab Test 12/16/23  0550 12/15/23  0500 12/14/23  0320   WBC 8.6 11.4* 12.3*   HGB 7.6* 8.8* 8.6*   MCV 98 97 96    318 271     Most Recent 3 BMP's:  Recent Labs   Lab Test 12/16/23  1208 12/16/23  0629 12/16/23  0550 12/15/23  1652 12/15/23  1538 12/15/23  0508 12/15/23  0500   NA  --   --  144  --  147*  147*  --  151*   POTASSIUM  --   --  4.0  --  4.2  --  4.0   CHLORIDE  --   --  106  --  106  --  110*   CO2  --   --  34*  --  32*  --  29   BUN  --   --  36.8*  --  42.1*  --  43.0*   CR  --   --  0.79  --  0.81  --  0.89   ANIONGAP  --   --  4*  --  9  --  12   BESS  --   --  8.1*  --  9.0  --  9.0   * 104* 118*   < > 131*   < > 102*    < > = values in this interval not displayed.     Most Recent 2 LFT's:  Recent Labs   Lab Test 12/16/23  0550 12/15/23  0500   AST 22 31   ALT 24 36   ALKPHOS 104 110   BILITOTAL 0.5 0.4     Most Recent 3 INR's:  Recent Labs   Lab Test 12/16/23  0550 12/15/23  1132 12/15/23  0500   INR 1.12 1.03 1.06     Most Recent 3 BNP's:  Recent Labs   Lab Test 12/09/23  0338 12/07/23  0128 12/06/23  1720 08/30/23  1406 06/23/21  0926 07/15/20  0929 01/12/19  0745   NTBNPI 22,193* 30,691*  --   --   --   --  16,791*   NTBNP  --   --  33,124* 3,249* 3,163*   < >  --     < > = values in this interval not displayed.     Most Recent TSH and T4:No lab results found.  Most Recent Hemoglobin  A1c:  Recent Labs   Lab Test 11/07/23  1102   A1C 5.7*     Most Recent 6 glucoses:  Recent Labs   Lab Test 12/16/23  1208 12/16/23  0629 12/16/23  0550 12/16/23  0011 12/15/23  2032 12/15/23  1652   * 104* 118* 102* 90 127*     Most Recent ABG:  Recent Labs   Lab Test 12/14/23  0745   PH 7.45   PO2 95   PCO2 44   HCO3 31*   FABIANA 6.1*

## 2023-12-16 NOTE — PROGRESS NOTES
Brief ENT note    Reached out to ICU team regarding discharge timing and were informed that the patient was planned for discharge this evening.  Dr. Pitts spoke with ICU team and recommended patient remain in hospital for 5 days after tracheostomy due to risk of bleeding in setting of anticoagulation and accidental decannulation with airway compromise.    Notes indicate that the [facility] staff will help coordinate ENT care needs going forward.    Marc Worthy MD  Otolaryngology Head and Neck Surgery Resident, PGY-2

## 2023-12-16 NOTE — PLAN OF CARE
"SLP: Order received for swallow evaluation per MD. Per SLP discharge note from acute [facility] hospital, \"Recommend pt continue trach dome with cuff deflated as tolerated. OK for PMSV use as tolerated with 1:1 supervision. Pt must be on trach dome with cuff deflated. Please remove PMSV if pt's SOB, fatigued, or sleeping. Recommend pt continue NPO with excellent oral cares. OK for sips of ice chips for comfort after oral cares. Pt would likely benefit from completion of video swallow study, however pt reported she is not quite ready for completion as she wants to take swallowing slow.\"    Attempted to see patient for speaking valve evaluation today, however pt working with PT and on commode this AM. Will re-schedule for 12/18. RT can assist with PMV trials as appropriate as order is placed per MD.  "

## 2023-12-16 NOTE — PLAN OF CARE
Problem: Adult Inpatient Plan of Care  Goal: Absence of Hospital-Acquired Illness or Injury  Intervention: Prevent Skin Injury  Recent Flowsheet Documentation  Taken 12/16/2023 1200 by Evelin Galloway RN  Body Position: weight shifting  Taken 12/16/2023 0946 by Evelin Galloway RN  Body Position:   turned   left  Intervention: Prevent Infection  Recent Flowsheet Documentation  Taken 12/16/2023 0804 by Evelin Galloway RN  Infection Prevention: hand hygiene promoted  Goal: Optimal Comfort and Wellbeing  Intervention: Provide Person-Centered Care  Recent Flowsheet Documentation  Taken 12/16/2023 1147 by Evelin Galloway RN  Trust Relationship/Rapport: care explained  Taken 12/16/2023 0804 by Evelin Galloway RN  Trust Relationship/Rapport: care explained     Problem: Artificial Airway  Goal: Effective Communication  Outcome: Progressing  Intervention: Ensure Effective Communication  Recent Flowsheet Documentation  Taken 12/16/2023 1147 by Evelin Galloway RN  Family/Support System Care: presence promoted  Trust Relationship/Rapport: care explained  Taken 12/16/2023 0804 by Evelin Galloway RN  Family/Support System Care: presence promoted  Trust Relationship/Rapport: care explained  Goal: Optimal Device Function  Intervention: Optimize Device Care and Function  Recent Flowsheet Documentation  Taken 12/16/2023 0946 by Evelin Galloway RN  Aspiration Precautions: oral hygiene care promoted  Taken 12/16/2023 0804 by Evelin Galloway RN  Airway Safety Measures: all equipment/monitors on and audible   Goal Outcome Evaluation:       Pt has loose bmx3, imodium given,denies pain.continue TM, no suction this shift by writer. continue Tele 3:1 A-flutter rhythm noted.cares met.will monitor.

## 2023-12-16 NOTE — CONSULTS
"CLINICAL NUTRITION SERVICES - ASSESSMENT NOTE     Nutrition Prescription    RECOMMENDATIONS FOR MDs/PROVIDERS TO ORDER:  None    Malnutrition Status:    Severe in acute illness    Recommendations already ordered by Registered Dietitian (RD):  No new    Future/Additional Recommendations:  Adjust TF pending marbella, weight, labs, BG  Consider change in TF to fiber containing formula if stooling does not improve       REASON FOR ASSESSMENT  Priya Funez is a/an 83 year old female assessed by the dietitian for Provider Order - Registered Dietitian to Assess and Order TF per Medical Nutrition Therapy Protocol    Pt presents with respiratory failure, PNA, rib fxs, severe malnutrition s/p elective TVR,   Hx DM2, CKD3, HLD, anxiety, HF, HTN, HLD, aortic dissection repair    NUTRITION HISTORY  Lives with her  in a house  TF started 12/8. Trach placed 12/13  Water flushes decreased 12/13. Banatrol added 12/1, increased 12/12    Eating WNL and no weight loss PTA    Hx of Peg and TF with transition to p.o in 2019    CURRENT NUTRITION ORDERS  Diet: NPO    Nutrition Support:  ND placed 12/8- distal duodenum  Osomolite 1.5 at 45 ml/hr + 1 prosource TF20, 4 banatrolTF and 200 ml H20 q 6 hr    Provides:   (1080ml/day) 1700 kcals (29 kcal/kg), 87 g PRO (1.5 g/kg), 822 ml free H20, 219 g CHO, and 0 g fiber daily.   Total H20 1622 ml     Enteral nutrition meets 100%of estimated nutrition needs    LABS  Labs reviewed  Na 144 WNL, improved from 151 yesterday  BUN 36 (H), improving  BG WNL    MEDICATIONS  Medications reviewed  Pepcid, ssi, remeron, mvi with minerals, florastor, warfarin    ANTHROPOMETRICS  Height: 161.3 cm (5' 3.5\")  Most Recent Weight: 75 kg (165 lb 5.5 oz)  12/16  IBW: 53.6 kg  BMI: Overweight BMI 25-29.9  Weight History: Weight trending up from baseline, 10 lb x 2 weeks. +1 generalized, +2 LE edema  Wt Readings from Last 10 Encounters:   12/16/23 75 kg (165 lb 5.5 oz)   12/15/23  12/6/23  11/28/23 74.1 kg (163 " lb 5.8 oz)- at [facility]  72.5 kg (159 lb )   70.8 kg (155 lb) - admit to [facility]   11/07/23 69.4 kg (153 lb)- office   11/01/23 71 kg (156 lb 8 oz)   10/16/23 69.9 kg (154 lb)   09/27/23 69.7 kg (153 lb 9.6 oz)   09/21/23 70.1 kg (154 lb 9.6 oz)   09/19/23 69.6 kg (153 lb 8 oz)   09/11/23 69.8 kg (153 lb 12.8 oz)   09/08/23 68.6 kg (151 lb 3.2 oz)       Dosing Weight: 58.9 kg adjusted weight    ASSESSED NUTRITION NEEDS  Estimated Energy Needs: 4259-6234 kcals/day (25 - 30 kcals/kg)  Justification: Maintenance  Estimated Protein Needs: 70-88 grams protein/day (1.2 - 1.5 grams of pro/kg)  Justification: Hypercatabolism with acute illness  Estimated Fluid Needs: 6755-6940 mL/day (1 mL/kcal)   Justification: Maintenance    PHYSICAL FINDINGS  See malnutrition section below.  GI - + cdiff 12/3  4 Loose BM so far  Unable to perform NFPA d/t working remotely    MALNUTRITION:  % Weight Loss:  None noted  % Intake:  No decreased intake noted- met with TF x 1 week  Subcutaneous Fat Loss:  Facial region:  Moderate, Upper arm:  moderate, and Lower arm:  mild - per 12/13 RD note  Muscle Loss:  Temporal:  moderate, Facial & jaw region:  mild, Upper arm (bicep, tricep):  moderate, Lower arm  (forearm):  mild, Dorsal hand:  moderate, and Posterior calf:  moderate - Per 12/13 RD note  Fluid Retention:  Moderate per 12/13 RD note    Malnutrition Diagnosis: Severe malnutrition per 12/13 RD nutrition assesement  In Context of:  Acute illness or injury    NUTRITION DIAGNOSIS  Inadequate oral intake related to mechanical ventilation as evidenced by NPO, reliance on TF and intubated      INTERVENTIONS  Implementation  Continue current nutrition RX    Goals  Meet > 75% of estimated nutrition needs  No weight loss below 69 kg  <3 loose BM/day     Monitoring/Evaluation  Progress toward goals will be monitored and evaluated per protocol.     To be determined post MBS

## 2023-12-17 NOTE — PROGRESS NOTES
[facility]    Medicine Progress Note - Hospitalist Service    Date of Admission:  12/15/2023    Brief summary:  Priya Funez is a 83 year old female with a PMHx of HFpEF, severe TR, permanent AF s/p AVN ablation with PPM implant 1/11/19 s/p extraction TV (transvenous) PPM and implant leadless PPM 7/31/23, emergent aortic dissection repair 1/4/19, HTN, CKD, NEDA, and obesity who presented to [facility] as an outpatient for elective TVR done on 11/28/2023.  Immediate postoperative course was complicated by acute hypoxic and hypercapnic respiratory failure requiring BiPAP and probable pneumonia and treated with 7 days of IV Zosyn, patient was transferred to surgical telemetry floor on 12/2/2020.  On 12/6/2023 she was transferred back to CVICU after RRT for acute hypoxic respiratory distress and subsequent respiratory arrest with short CPR, re-intubation, and ROSC achieved. She sustained mildly displaced and nondisplaced right 3-5 rib fractures with associated right chest wall hematoma and nondisplaced anterolateral left 4th and 5th rib fractures. She was treated with meropenem 7 day course. She also has hx of recent C diff and multiple loose stools, for which she completed a 7 day po vancomycin course (per antimicrobial stewardship team, more likely to be colonization with recent history and no apparent treatment). Patient was transiently hyperglycemic and treated with insulin infusion then transitioned to sliding scale insulin per protocol. Blood sugars remained stable, has not required insulin coverage.  She remained intubated in the CVICU until undergoing tracheostomy by Dr. Pitts on 12/13/23, doing well with trach dome and PS currently.  She was treated for volume overload with diuretics (Bumex and chlorothiazide) with subsequent hypernatremia and hypotension requiring FWF and pressor support.  Spironolactone, torsemide and metoprolol discontinued and midodrine started with now stable blood pressures. Pre-op weight 70.9 kg,  discharge weight 74.1 kg (163 lbs) on 12/15/2023.  Patient was discharged to LTACH 12/15/2023 for continuing vent weaning, therapies     LTACH course:  12/17: Vitals stable.  Scheduled midodrine discontinued with BP stable in the 140s.  Diuretic therapy with torsemide restarted given peripheral edema and volume status, however at lower dose of 10 mg daily (home dose 40 mg daily) titrate slowly based on BP response.  Resume metoprolol for A-fib rate control as well gradually.  Labs reviewed, stable electrolytes, creatinine 0.82. ProBNP 3710, down from 80077 and 94978.  Hgb 8.0 from 7.6.  INR 0.98 from 1.12.  Continue heparin and Coumadin together for now until INR therapeutic.  Started hydroxyzine for anxiety 12/17.  A.m. labs ordered.      Assessment & Plan      #Acute respiratory failure and arrest on 12/6/23   #Acute hypercapnic and hypoxic respiratory failure requiring BiPAP  # s/p tracheostomy 12/13/23  # s/p Mechanical ventilation  #Aspiration pneumonitis vs pneumonia  #Possible aspiration event 12/1  #Near complete plugging of left lower lobe on CT   #Acute mildly displaced and nondisplaced right 3-5 rib fractures with associated right chest wall hematoma and nondisplaced anterolateral left fourth and fifth rib fractures  -Admit to LTACH 12/15/2023 to continue vent weaning, therapies  -Pulmonology consult for vent weaning process  -RT consult for tracheostomy cares, oxygenation, nebs, pulmonary therapies  - Tracheostomy sutures to be removed POD#5 (12/18/2023)  - Mucomyst, duoneb w/ RT Q4H   - Trach dome as much as tolerated   - Continue therapy with RT   - SLP speaking valve as tolerated      # S/p respiratory arrest 12/6  # Hx of severe TR s/p TVR  # HFpEF  # Hx of Afib s/p AVN ablation - PPM, upgraded to leadless 7/2023  # Hypertension  # Hyperlipidemia  # Hx of aortic dissection repair 2019  # Respiratory arrest 12/6   # Elevated BNP, improving   - Stable paced rhythm with underlying atrial flutter. On  review of cardiology, underlying aflutter/afib has been present since at least June of this year. Device RN adjusted PPM to PTA settings rate 60 at [facility].   - Pre-op 9/8/23 echo: LVEF 55/60%, mildly dilated RV, normal function  - Echo 12/4 with LVEF 60-65%, normal RV function, TV mean gradient 5 mmHg, no TR  - Echo 12/7 with no significant change from previous with the exception of mild reduction of RV. RV free wall dyskinesia.   - ASA 81 mg daily  -Hold scheduled midodrine 10 mg Q8H since BP now in the 140s systolic and stable on 12/16  -Continue midodrine 5 mg TID PRN for SBP<100   - Held PTA spironolactone, torsemide, metoprolol due to hypotension at prior hospital  - FLUID STATUS: Pre-op weight 70.9 kg, discharge weight 74.1 kg (163 lbs) on 12/15/2023 and 165 lbs on 12/16  -Gradually start diuretics, started torsemide 10 mg daily now since weight up, has 3+ edema peripheral edema (home dose torsemide 40 mg daily)  -proBNP trend appears improved from a peak of 48359 to now 3710.  -Monitor volume status daily and gradually titrate diuretics as appropriate  - Started Warfarin 12/15.  Pharmacy consulted for Coumadin dosing (INR goal 2-3)   -Continue heparin (DVT prophylaxis dose) and Coumadin together for now until INR therapeutic  -Continue telemetry monitoring     # Multiple loose stools   # Hx of C. Difficile (9/2023)  # Severe malnutrition in the context of acute illness  - Nutrition consulted for continuing tube feedings  - NJT and enteral feeds at goal (45 mL/hr)   - SLP: NPO, okay for ice chips, swallow study per SLP  - PPI famotidine  - Bowel regimen discontinued due to continued loose stools  - Finished vancomycin po 7 day course   - Continue Banatrol  - PRN Zofran  4 mg PO or IV Q6H  - PRN Compazine 5 mg IV Q6H   - PRN Imodium QID      #Hypernatremia   #Hypokalemia, resolved   #CKD stage 3  #Lactic acidosis, resolved  #IMAN on CKD, resolved   Baseline creat ~1.2-1.3   FLUID STATUS: Pre-op weight 70.9 kg,  discharge weight 74.1 kg (163 lbs).   - Held PTA spironolactone 25 mg daily  - Held PTA torsemide   - Elevated sodium, Na 151 --> 147 on recheck, likely 2/2 aggressive diureses S/p bumex and chlorotiazide prior to transfer  -On free water flushes @45 mL Q1H, changed to 200 mL every 4 hourly on 12/16  - I/O has been inaccurate due to unmeasured stools and voids   - Replete lytes per protocol  - Avoid/limit nephrotoxins as able  - Strict I/O, daily weights, avoid/limit nephrotoxins     #Stress induced hyperglycemia  #T2DM  Hgb A1c 5.7%  - Medium Sliding scale insulin   - PTA Jardiance on hold, resume once medically optimized   - Goal BG <180 for optimal healing     # Stress induced leukocytosis  # Probable aspiration pneumonitis vs pneumonia, resolved  - 11/30 Zosyn 7-day course for pneumonia (finished)   - 12/6 Given further decompensation, Vanco and meropenem was started on 12/6   - Finished meropenem 7 day course (12/12)   - Finished Vancomycin PO 7 day course (for c-diff) (12/13)  - Blood culture with NGTD   - Urine with Candida and sputum with Yeast, likely both colonizations.   - Urine culture with NGTD     #Hx of recent C. Difficile  - C. Difficile toxin PCR positive 12/3 with negative antigen and negative toxin - per antimicrobial stewardship team, more likely to be colonization with recent history and no apparent treatment  - Finished oral vancomycin course on 12/13  - Monitor fever curve, WBC, and inflammatory markers as appropriate  - Enteric precautions per infection prevention protocol      # 3rd, 4th right rib fractures due to CPR  -Continue mini thoracotomy precautions  -Judicious use of narcotics, de-escalate as able    # Acute blood loss anemia  - Hgb has been stable.   -Hgb 7.6 on 12/16 and 8.0 on 12/17/2023  -Iron studies: Serum iron 29, iron binding capacity 216, saturation index 13%.  -Follow CBC with weekly labs.   -Transfuse if Hgb<7    #Acute postoperative pain  - PRN: Tylenol, oxycodone    #Hx  "of general anxiety disorder  - PTA Lexapro and Remeron  - Scheduled Melatonin HS     # Perineal irritation    - WOC consulted        Diet: Adult Formula Drip Feeding: Continuous Osmolite 1.5; Nasojejunal; Goal Rate: 45; mL/hr; Do not advance tube feeding rate unless K+ is = or > 3.0, Mg++ is = or > 1.5, and Phos is = or > 1.9    DVT Prophylaxis: Heparin SQ and Warfarin  Barrientos Catheter: Not present  Lines: PRESENT      PICC 12/07/23 Triple Lumen Right Basilic ok to use PICC-Site Assessment: WDL      Cardiac Monitoring: ACTIVE order. Indication: Tachyarrhythmias, acute (48 hours)  Code Status: Full Code      Clinically Significant Risk Factors         # Hypernatremia: Highest Na = 147 mmol/L in last 2 days, will monitor as appropriate   # Hypercalcemia: corrected calcium is >10.1, will monitor as appropriate    # Hypoalbuminemia: Lowest albumin = 2.7 g/dL at 12/16/2023  5:50 AM, will monitor as appropriate     # Hypertension: Noted on problem list    # Chronic heart failure with preserved ejection fraction: heart failure noted on problem list and last echo with EF >50%       # Overweight: Estimated body mass index is 29.1 kg/m  as calculated from the following:    Height as of this encounter: 1.613 m (5' 3.5\").    Weight as of this encounter: 75.7 kg (166 lb 14.2 oz)., PRESENT ON ADMISSION  # Severe Malnutrition: based on nutrition assessment, PRESENT ON ADMISSION     # Financial/Environmental Concerns:     # Pacemaker present  # History of CABG: noted on surgical history       Disposition Plan     Expected Discharge Date: 12/21/2023                    MARITO LEPE MD  Hospitalist Service  LTACH  Securely message with [employer] (more info)  Text page via [employer] Paging/Directory   ______________________________________________________________________    Interval History   No events overnight  Less peripheral edema after starting torsemide  BP stable, in the 120s-140 systolic off midodrine  Has some anxiety overnight, " requesting something for anxiety      Physical Exam   Vital Signs: Temp: 98.4  F (36.9  C) Temp src: Oral BP: 129/57 Pulse: 61   Resp: 22 SpO2: 93 % O2 Device: Trach dome Oxygen Delivery: (S) 30 LPM  Weight: 166 lbs 14.21 oz  GENERAL: Alert, oriented, conversant, in no distress.   EYES: Normal conjunctiva. Sclera anicteric. Nonystagmus. Extraocular movements intact.   NECK: Supple, no lymph adenopathy. JVP is not distended.  Tracheostomy in place  NOSE: NJ tube in place  LUNGS: Clear to auscultation. No ronchi or crackles. Equal air entry bilaterally.   HEART: S1 S2, Rate and rhythm is regular. No murmurs  ABDOMEN: Soft, nontender, no distension. Bowel sounds are positive. No guarding or rebound.   EXTREMITIES: 2+ pitting edema  SKIN: No rash or ulcers.   NEUROLOGIC: Alert and oriented x3.  Mouthing words, communicates by writing appropriately.  Clear mentation. Motor, sensory and cranial exam is grossly intact andsymmetric.   PSYCHIATRIC: Normal affect and cognition       Medical Decision Making       54 MINUTES SPENT BY ME on the date of service doing chart review, history, exam, documentation & further activities per the note.      Data     I have personally reviewed the following data over the past 24 hrs:    8.0  \   8.0 (L)   / 271     141 103 32.5 (H) /  119 (H)   4.3 33 (H) 0.82 \     ALT: 24 AST: 25 AP: 128 TBILI: 0.5   ALB: 2.9 (L) TOT PROTEIN: 5.4 (L) LIPASE: N/A     Trop: N/A BNP: 3,710 (H)     INR:  0.98 PTT:  N/A   D-dimer:  N/A Fibrinogen:  N/A       Imaging results reviewed over the past 24 hrs:   No results found for this or any previous visit (from the past 24 hour(s)).  Recent Labs   Lab 12/17/23  0600 12/17/23  0534 12/17/23  0029 12/16/23  0629 12/16/23  0550 12/15/23  1652 12/15/23  1538 12/15/23  1144 12/15/23  1132 12/15/23  0508 12/15/23  0500   WBC 8.0  --   --   --  8.6  --   --   --   --   --  11.4*   HGB 8.0*  --   --   --  7.6*  --   --   --   --   --  8.8*   MCV 97  --   --   --  98   --   --   --   --   --  97     --   --   --  274  --   --   --   --   --  318   INR 0.98  --   --   --  1.12  --   --   --  1.03  --  1.06     --   --   --  144  --  147*  147*  --   --   --  151*   POTASSIUM 4.3  --   --   --  4.0  --  4.2  --   --   --  4.0   CHLORIDE 103  --   --   --  106  --  106  --   --   --  110*   CO2 33*  --   --   --  34*  --  32*  --   --   --  29   BUN 32.5*  --   --   --  36.8*  --  42.1*  --   --   --  43.0*   CR 0.82  --   --   --  0.79  --  0.81  --   --   --  0.89   ANIONGAP 5*  --   --   --  4*  --  9  --   --   --  12   BESS 9.1  --   --   --  8.1*  --  9.0  --   --   --  9.0   * 120* 99   < > 118*   < > 131*   < >  --    < > 102*   ALBUMIN 2.9*  --   --   --  2.7*  --   --   --   --   --  3.2*   PROTTOTAL 5.4*  --   --   --  4.8*  --   --   --   --   --  5.7*   BILITOTAL 0.5  --   --   --  0.5  --   --   --   --   --  0.4   ALKPHOS 128  --   --   --  104  --   --   --   --   --  110   ALT 24  --   --   --  24  --   --   --   --   --  36   AST 25  --   --   --  22  --   --   --   --   --  31    < > = values in this interval not displayed.       Most Recent 3 CBC's:  Recent Labs   Lab Test 12/17/23  0600 12/16/23  0550 12/15/23  0500   WBC 8.0 8.6 11.4*   HGB 8.0* 7.6* 8.8*   MCV 97 98 97    274 318     Most Recent 3 BMP's:  Recent Labs   Lab Test 12/17/23  0600 12/17/23  0534 12/17/23  0029 12/16/23  0629 12/16/23  0550 12/15/23  1652 12/15/23  1538     --   --   --  144  --  147*  147*   POTASSIUM 4.3  --   --   --  4.0  --  4.2   CHLORIDE 103  --   --   --  106  --  106   CO2 33*  --   --   --  34*  --  32*   BUN 32.5*  --   --   --  36.8*  --  42.1*   CR 0.82  --   --   --  0.79  --  0.81   ANIONGAP 5*  --   --   --  4*  --  9   BESS 9.1  --   --   --  8.1*  --  9.0   * 120* 99   < > 118*   < > 131*    < > = values in this interval not displayed.     Most Recent 2 LFT's:  Recent Labs   Lab Test 12/17/23  0600 12/16/23  0550   AST 25 22    ALT 24 24   ALKPHOS 128 104   BILITOTAL 0.5 0.5     Most Recent 3 INR's:  Recent Labs   Lab Test 12/17/23  0600 12/16/23  0550 12/15/23  1132   INR 0.98 1.12 1.03     Most Recent 3 BNP's:  Recent Labs   Lab Test 12/17/23  0600 12/09/23  0338 12/07/23  0128 12/06/23  1720 08/30/23  1406 06/23/21  0926   NTBNPI 3,710* 22,193* 30,691*  --   --   --    NTBNP  --   --   --  33,124* 3,249* 3,163*     Most Recent 6 glucoses:  Recent Labs   Lab Test 12/17/23  0600 12/17/23  0534 12/17/23  0029 12/16/23  1750 12/16/23  1208 12/16/23  0629   * 120* 99 102* 107* 104*     Most Recent ABG:  Recent Labs   Lab Test 12/14/23  0745   PH 7.45   PO2 95   PCO2 44   HCO3 31*   FABIANA 6.1*     Most Recent ESR & CRP:  Recent Labs   Lab Test 12/06/23  1720   CRPI 73.40*

## 2023-12-17 NOTE — PLAN OF CARE
Problem: Mechanical Ventilation Invasive  Goal: Effective Communication  12/17/2023 0020 by Allison Casarez, RT  Outcome: Progressing  12/17/2023 0019 by Allison Casarez, RT  Outcome: Progressing  Goal: Optimal Device Function  12/17/2023 0020 by Allison Casarez, RT  Outcome: Progressing  12/17/2023 0019 by Allison Casarez, RT  Outcome: Progressing  Intervention: Optimize Device Care and Function  Recent Flowsheet Documentation  Taken 12/16/2023 2225 by Allison Casarez, RT  Airway Safety Measures: all equipment/monitors on and audible  Taken 12/16/2023 2221 by Allison Casarez, RT  Airway Safety Measures: all equipment/monitors on and audible  Goal: Mechanical Ventilation Liberation  Outcome: Progressing  Goal: Optimal Nutrition Delivery  12/17/2023 0020 by Allison Casarez, RT  Outcome: Progressing  12/17/2023 0019 by Allison Casarez, RT  Outcome: Progressing  Goal: Absence of Device-Related Skin and Tissue Injury  12/17/2023 0020 by Allison Casarez, RT  Outcome: Progressing  12/17/2023 0019 by Allison Casarez, RT  Outcome: Progressing  Goal: Absence of Ventilator-Induced Lung Injury  12/17/2023 0020 by Allison Casarez, RT  Outcome: Progressing  12/17/2023 0019 by Allison Casarez, RT  Outcome: Progressing   RT PROGRESS NOTE   [id number]  DATA:     CURRENT SETTINGS:             TRACH TYPE / SIZE: #6 Shiley, placed on 12/13/23, sutures in place              MODE:  AC 18 390 +5 30% to 24%             FIO2:        ACTION:             THERAPIES: Albuterol/Mucomyst neb QID             SUCTION:                           FREQUENCY:  X4-5 for white to yellow thickish secr.                         AMOUNT:                           CONSISTENCY:                           COLOR:                SPONTANEOUS COUGH EFFORT/STRENGTH OF EFFORT (not elicited by suctioning):                               WEANING PHASE: 2                          WEAN MODE:  TM 30% 40L for 12hr 40 min, marbella well  with RR 18-20 at the end, no SOB      Restarted TM 30% 40L in a.m. at 0605am because trach site was more sensitive while on a vent. Pt seemed somewhat SOB and Alb/Mucomyst meb was given with improvement.                        WEAN TIME:                           END WEAN REASON:   vent at noc     RESPONSE:             BS:                VITAL SIGNS:                EMOTIONAL NEEDS / CONCERNS:                  RISK FOR SELF DECANNULATION:                          RISK DUE TO:                          INTERVENTION/S IN PLACE IS/ARE:         NOTE / PLAN:   Goal Outcome Evaluation:    Pt was admitted on 12/15/23 at 2018 on TM, placed on AC for noc pwe MD order.Pt marbella TM very well.    Cont to monitor and treat

## 2023-12-17 NOTE — PLAN OF CARE
"  Problem: Adult Inpatient Plan of Care  Goal: Plan of Care Review  Description: The Plan of Care Review/Shift note should be completed every shift.  The Outcome Evaluation is a brief statement about your assessment that the patient is improving, declining, or no change.  This information will be displayed automatically on your shift  note.  Outcome: Progressing     Problem: Enteral Nutrition  Goal: Absence of Aspiration Signs and Symptoms  Outcome: Progressing     Problem: Anxiety Signs/Symptoms  Goal: Optimized Energy Level (Anxiety Signs/Symptoms)  Outcome: Progressing     Problem: Skin Injury Risk Increased  Goal: Skin Health and Integrity  Outcome: Progressing   Goal Outcome Evaluation:         Patient alert and cooperative; pleasant on approach. Medication and care compliant. Denies pain or discomfort. Trach dome in place. Continues on telemetry; no changes, running A flutter with pacer. Family visited in afternoon. Pt OOB x 2 and tolerated well. Incontinent. Skin to perineal area red d/t incontinent stool; ordered treatment applied. Plan of care ongoing. /57 (BP Location: Left arm)   Pulse 61   Temp 98.4  F (36.9  C) (Oral)   Resp 22   Ht 1.613 m (5' 3.5\")   Wt 75.7 kg (166 lb 14.2 oz)   SpO2 98%   BMI 29.10 kg/m     Mesfin Arriaga RN                "

## 2023-12-17 NOTE — PLAN OF CARE
Problem: Adult Inpatient Plan of Care  Goal: Absence of Hospital-Acquired Illness or Injury  Outcome: Progressing  Intervention: Identify and Manage Fall Risk  Recent Flowsheet Documentation  Taken 12/17/2023 0123 by Shakira Figueroa RN  Safety Promotion/Fall Prevention:   room door open   room near nurse's station   safety round/check completed  Taken 12/16/2023 2158 by Shakira Figueroa RN  Safety Promotion/Fall Prevention:   room door open   room near nurse's station   safety round/check completed  Intervention: Prevent Infection  Recent Flowsheet Documentation  Taken 12/17/2023 0123 by Shakira Figueroa RN  Infection Prevention:   hand hygiene promoted   rest/sleep promoted  Taken 12/16/2023 2158 by Shakira Figueroa RN  Infection Prevention:   hand hygiene promoted   rest/sleep promoted     Problem: Adult Inpatient Plan of Care  Goal: Optimal Comfort and Wellbeing  Outcome: Progressing  Intervention: Provide Person-Centered Care  Recent Flowsheet Documentation  Taken 12/17/2023 0123 by Shakira Figueroa RN  Trust Relationship/Rapport:   care explained   choices provided   emotional support provided  Taken 12/16/2023 2158 by Shakira Figueroa RN  Trust Relationship/Rapport:   care explained   choices provided   emotional support provided   Goal Outcome Evaluation:       Vital signs were stable. Pt. complained of right leg pain, prn tylenol given with effect. Pt. had small incontinent BM X 1 in the shift. Pt. slept 4 to 5 hours in the shift. BG were 99 & 120 for the shift. Pt. Is on tele for cardiac monitoring. Continue to monitor.   Shakira Figueroa RN

## 2023-12-17 NOTE — PLAN OF CARE
Problem: Artificial Airway  Goal: Effective Communication  Outcome: Progressing  Goal: Optimal Device Function  Outcome: Progressing  Intervention: Optimize Device Care and Function  Recent Flowsheet Documentation  Taken 12/17/2023 0725 by Bear Valverde RT  Airway Safety Measures: all equipment/monitors on and audible  Goal: Absence of Device-Related Skin or Tissue Injury  Outcome: Progressing     Problem: Mechanical Ventilation Invasive  Goal: Effective Communication  Outcome: Progressing  Goal: Optimal Device Function  Outcome: Progressing  Intervention: Optimize Device Care and Function  Recent Flowsheet Documentation  Taken 12/17/2023 0725 by Bear Valverde RT  Airway Safety Measures: all equipment/monitors on and audible  Goal: Mechanical Ventilation Liberation  Outcome: Progressing  Goal: Optimal Nutrition Delivery  Outcome: Progressing  Goal: Absence of Device-Related Skin and Tissue Injury  Outcome: Progressing  Goal: Absence of Ventilator-Induced Lung Injury  Outcome: Progressing    RT PROGRESS NOTE     DATA:     CURRENT SETTINGS: AC/18/390/+5             TRACH TYPE / SIZE:  #6 Flip TG 12/13/23             MODE:   AC             FIO2:   30%     ACTION:             THERAPIES:   ALB QID/MUCOMYST QID             SUCTION:                           FREQUENCY:  3                        AMOUNT:  small                        CONSISTENCY:   Thick                        COLOR:   Pale/yellow             SPONTANEOUS COUGH EFFORT/STRENGTH OF EFFORT (not elicited by suctioning): Yes:strong                              WEANING PHASE:   #2                        WEAN MODE:    30% and 30L/TM with cuff up days and full vent at night                        WEAN TIME:  Started TM 30%/30L in this morning @ 0605 AM.                         END WEAN REASON:   Ongoing      RESPONSE:             BS:   coarse              VITAL SIGNS:   Blood pressure 129/57, pulse 61, temperature 98.4  F (36.9  C), temperature source  "Oral, resp. rate 22, height 1.613 m (5' 3.5\"), weight 75.7 kg (166 lb 14.2 oz), SpO2 98%, not currently breastfeeding.            EMOTIONAL NEEDS / CONCERNS:  N/A                RISK FOR SELF DECANNULATION:  N/A                        RISK DUE TO:                          INTERVENTION/S IN PLACE IS/ARE:  N/A       NOTE / PLAN: Patient has been weaning on TM 30%/30l since this morning at 0605 AM and is tolerating well. Plan: will place on full-vent support for NOC.                       "

## 2023-12-18 NOTE — PLAN OF CARE
Problem: Skin Injury Risk Increased  Goal: Skin Health and Integrity  Intervention: Optimize Skin Protection  Recent Flowsheet Documentation  Taken 12/18/2023 0152 by Nikhil Jameson Jr RN  Head of Bed (HOB) Positioning: HOB at 30 degrees     Patient is alert and oriented and has been able to let needs known. Patient complained of having a sensation of bloated stomach at around 1900hrs, in house physician was then notified and ordered Simeticone PRN. The said medication was then administered at around 2150hrs and 0548 hrs and has been effective. Patient has been able to sleep well all through the night.

## 2023-12-18 NOTE — PROGRESS NOTES
"BLUE DYE TEST     12/18/23 1501   Appointment Info   Signing Clinician's Name / Credentials (SLP) Angella Ni [facility]   General Information   Onset of Illness/Injury or Date of Surgery 11/28/23   Referring Physician Trevin Kline MD   Patient/Family Therapy Goal Statement (SLP) None stated.   Pertinent History of Current Problem Per pulmonology note: \"83 yoF with PMH of HFpEF, severe TR, permanent AF s/p ablation & leadless PPM 7/31/23, emergent aortic dissection repair 1/2019, HTN, CKD, NEDA, and obesity who presented as an outpatient for elective TVR completed on 11/28/2023. Subsequently had respiratory arrest on 12/6 with short CPR prior to ROSC. Treated for presumed pneumonia with multiple courses of abx, now off.  S/p trach on 12/13. She has been progressing with trach dome and intermittently on PS. Fluid overloaded treated with diuretics.\"   General Observations Patient up in chair, alert, interactive. Patient writing messages effectively to communicate.   Type of Evaluation   Type of Evaluation Swallow Evaluation  (Blue Dye Test)   Tracheostomy Assessment (Speaking Valve)   Type, Tracheostomy Tube Shiley   Tube Size, Tracheostomy 6 TG   Cuff, Tracheostomy Tube cuffed, inflated   Date of Tracheostomy 12/13/23   Participation Ability (Speaking Valve) awake/alert;attempts to communicate, mouthing words;attempts to communicate nonverbally;follows simple commands   Comment, Tracheostomy (Speaking Valve) RT switched patient from vent to HFTD during session this PM. Patient had been on HFTD this AM but changed to full vent support per patient/family request due to SOB.   Respiratory Status (Speaking Valve)   Oxygen Supply Trach Dome  (30L/36% FIO2)   Oral/Tracheal Secretions (Speaking Valve)   Oral Secretions (Speaking Valve Assessment) minimal secretions   Tracheal Secretions (Speaking Valve Assessment) minimal secretions   Recent Diagnostic Results (Speaking Valve)   Chest X-Ray, Date and Results " "(Speaking Valve) CXR 12/14/2023: \"Similar perihilar and bibasilar opacities, favors atelectasis/edema with right-sided pleural effusion.\"   General Swallowing Observations   Current Diet/Method of Nutritional Intake (General Swallowing Observations, NIS) NPO;ice chips;nasogastric tube (NG)   Respiratory Support high-flow;tracheostomy collar   Past History of Dysphagia Clinical swallow evaluation 12/15/2023 at [facility] hospital with recomendation for NPO except for ice chips. Per SLP note 12/15/2023: \"Pt familiar to SLP caseload during admission to [facility] in 2019. At that time, pt complete VFSS which demonstrated penetration with thin liquids and aspiration w/cough of mildly thick liquids. A regular diet and honey thick liquids was recommended at that time. Since then, pt has not followed with SLP and self-advanced to thin liquids. Pt denied any recent trouble swallowing and reported no hx of PNA. Pt evaluated earlier this admission with recommendations for regular diet and thin liquids. SLP offerred repeat VFSS at that time d/t report hx of aspiration, however pt politely declined d/t no concerns related to swallowing. Pt now apprehensive regarding eating/drinking and wanting to \"take things slow.\"\"   Swallowing Evaluation Clinical swallow evaluation  (Blue Dye Test)   Comment, General Swallowing Observations Blue dye applied to lingual surface via syringe and spoon while on vent and cuff inflated.   Clinical Impression   Criteria for Skilled Therapeutic Interventions Met (SLP Eval) Yes, treatment indicated   SLP Diagnosis dysphagia   Risks & Benefits of therapy have been explained evaluation/treatment results reviewed;care plan/treatment goals reviewed;risks/benefits reviewed;current/potential barriers reviewed;participants voiced agreement with care plan;participants included;patient   Clinical Impression Comments Blue Dye Test completed per MD order to assess current oral secretion management status. Blue dye " applied to lingual surface while on full vent support and cuff inflated. RT then switched patient to HFTD (30L/36% FIO2) and trach cuff was deflated. No blue dye observed at trach stoma site. Upon tracheal suctioning, no immediate dye suctioned tracheally. Speaking valve placed after tracheal suctioning with cuff deflated (see separate report for details). At end of session, speaking valve removed and trach cuff re-inflated. RT and RN to monitor for delayed evidence of aspiration of secretions.   SLP Total Evaluation Time   Eval: oral/pharyngeal swallow function, clinical swallow Minutes (16605) 10   SLP Goals   Therapy Frequency (SLP Eval) 5 times/week   SLP Predicted Duration/Target Date for Goal Attainment 02/26/24   SLP Goals Swallow       SLP: Tolerate valve placement without change in vital signs 30 minutes or longer   SLP: Demonstrate clear voicing at 3 foot distance from listener for sentence-length speech;with 90% intelligibility   Interventions   Interventions Quick Adds Swallowing Dysfunction   SLP Discharge Planning   SLP Plan f/u BDT from 12/18 for delayed aspiration signs; PMSV trials on HFTD (anticipate will tolerate well); BSS/VFSS once tolerating PMSV for longer periods of time   SLP Discharge Recommendation [facility]-Motivated patient will benefit from intensive, interdisciplinary therapy.  Anticipate will be able to tolerate 3 hours of therapy per day   SLP Rationale for DC Rec Continued SLP for dysphagia and communication.   SLP Brief overview of current status  Blue Dye Test completed per MD. No blue dye observed at trach stoma site. Upon tracheal suctioning, no immediate dye suctioned tracheally. RT and RN to monitor for delayed evidence of aspiration of secretions.   Total Session Time   Total Session Time (sum of timed and untimed services) 10

## 2023-12-18 NOTE — PROGRESS NOTES
"   12/18/23 1400   Appointment Info   Signing Clinician's Name / Credentials (OT) Cathi Lynch, OTR/L   Living Environment   People in Home spouse   Current Living Arrangements house   Living Environment Comments all needs met on main level, spouse able to assist PRN   Self-Care   Equipment Currently Used at Home walker, rolling   Activity/Exercise/Self-Care Comment ind for BADLs   Instrumental Activities of Daily Living (IADL)   IADL Comments ind for IADLs, meds, driving   General Information   Onset of Illness/Injury or Date of Surgery 12/15/23   Referring Physician Trevin Kline MD   Patient/Family Therapy Goal Statement (OT) go home   Additional Occupational Profile Info/Pertinent History of Current Problem per MD H&P: \"PMHx of HFpEF, severe TR, permanent AF s/p AVN ablation with PPM implant 1/11/19 s/p extraction TV (transvenous) PPM and implant leadless PPM 7/31/23, emergent aortic dissection repair 1/4/19, HTN, CKD, NEDA, and obesity who presented to [facility] as an outpatient for elective TVR done on 11/28/2023.  Immediate postoperative course was complicated by acute hypoxic and hypercapnic respiratory failure requiring BiPAP and probable pneumonia and treated with 7 days of IV Zosyn, patient was transferred to surgical telemetry floor on 12/2/2020.  On 12/6/2023 she was transferred back to CVICU after RRT for acute hypoxic respiratory distress and subsequent respiratory arrest with short CPR, re-intubation, and ROSC achieved. She sustained mildly displaced and nondisplaced right 3-5 rib fractures with associated right chest wall hematoma and nondisplaced anterolateral left 4th and 5th rib fractures. She was treated with meropenem 7 day course. She also has hx of recent C diff and multiple loose stools, for which she completed a 7 day po vancomycin course (per antimicrobial stewardship team, more likely to be colonization with recent history and no apparent treatment). Patient was transiently hyperglycemic " "and treated with insulin infusion then transitioned to sliding scale insulin per protocol. Blood sugars remained stable, has not required insulin coverage.  She remained intubated in the CVICU until undergoing tracheostomy by Dr. Pitts on 12/13/23, doing well with trach souleymane and PS currently.  She was treated for volume overload with diuretics (Bumex and chlorothiazide) with subsequent hypernatremia and hypotension requiring FWF and pressor support.  Spironolactone, torsemide and metoprolol discontinued and midodrine started with now stable blood pressures. Pre-op weight 70.9 kg, discharge weight 74.1 kg (163 lbs) on 12/15/2023.  Patient was discharged to [facility] 12/15/2023 for continuing vent weaning, therapies\"   Existing Precautions/Restrictions fall;oxygen therapy device and L/min   Cognitive Status Examination   Orientation Status orientation to person, place and time   Affect/Mental Status (Cognitive) WFL   Follows Commands WFL   Cognitive Status Comments uses writting to communicate d/t vent   Pain Assessment   Patient Currently in Pain No   Range of Motion Comprehensive   Comment, General Range of Motion BUE WFL   Strength Comprehensive (MMT)   Comment, General Manual Muscle Testing (MMT) Assessment BUE generally weak   Bed Mobility   Comment (Bed Mobility) NT - pt in recliner and wanted to remain in recliner   Transfers   Transfers sit-stand transfer   Sit-Stand Transfer   Sit-Stand [place] (Transfers) minimum assist (75% patient effort);verbal cues   Assistive Device (Sit-Stand Transfers) walker, front-wheeled   Activities of Daily Living   BADL Assessment/Intervention lower body dressing;toileting;bathing   Bathing Assessment/Intervention   [place] Level (Bathing) moderate assist (50% patient effort)   Comment, (Bathing) per clinical judgement   Lower Body Dressing Assessment/Training   [place] Level (Lower Body Dressing) minimum assist (75% patient effort)   Comment, (Lower Body Dressing) per " clinical judgement   Grooming Assessment/Training   [place] Level (Grooming) set up   Position (Grooming) supported sitting   Toileting   [place] Level (Toileting) minimum assist (75% patient effort)   Comment, (Toileting) per clinical judgement   Clinical Impression   Criteria for Skilled Therapeutic Interventions Met (OT) Yes, treatment indicated   OT Diagnosis dec BADL indep   OT Problem List-Impairments impacting ADL problems related to;activity tolerance impaired;balance;mobility;strength   Assessment of Occupational Performance 5 or more Performance Deficits   Identified Performance Deficits dressing, toileting, bathing, household mobility, functional transfers, household management, meal preparation   Planned Therapy Interventions (OT) ADL retraining;IADL retraining;bed mobility training;strengthening;transfer training;home program guidelines;progressive activity/exercise   Clinical Decision Making Complexity (OT) detailed assessment/moderate complexity   Risk & Benefits of therapy have been explained evaluation/treatment results reviewed;participants included;patient   OT Total Evaluation Time   OT Eval, Moderate Complexity Minutes (22214) 15   OT Goals   Therapy Frequency (OT) 5 times/week   OT Predicted Duration/Target Date for Goal Attainment 01/26/24   OT Goals OT Goal 1   OT: Hygiene/Grooming modified independent;while standing   OT: Lower Body Dressing Modified independent   OT: Lower Body Bathing Supervision/stand-by assist   OT: Transfer Supervision/stand-by assist  (tub transfer)   OT: Toilet Transfer/Toileting Modified independent;toilet transfer;cleaning and garment management   OT: Goal 1 Pt will complete 10 minutes of standing dynamic activity with supervision to progress ability to complete household tasks.   Therapeutic Procedures/Exercise   Therapeutic Procedure: strength, endurance, ROM, flexibillity minutes (39888) 15   Symptoms Noted During/After Treatment fatigue;shortness of  breath   Treatment Detail/Skilled Intervention Facilitated TB exercise to progress ability to complete ADLs IND. Cues for hand placement to increase ease of transfer after attempting and being unable to come to stand - progresses to CGA. CGA for standing and marching in place ~5 steps with 4WW. Reports difficulty d/t LE swelling. Facilitated seated BUE ex, 2 sets, 20 reps: forward reach, lateral reach, overhead reach. Reqires rest breaks ~1 min between.   OT Discharge Planning   OT Plan 12/18: standing tolerance, TB dressing, commode transfers, g/h at sink, BUE ex, issue HEP?   OT Discharge Recommendation (DC Rec) home with assist;home with home care occupational therapy   OT Rationale for DC Rec anticipate pt to progress during stay to allow for pt to d/c home with spouse's assist. spouse reports he can assist PRN for BADLs.   OT Brief overview of current status OT eval completed and treatment initiated. limited by weakness and endurance but motivated to return to PLOF.   Total Session Time   Timed Code Treatment Minutes 15   Total Session Time (sum of timed and untimed services) 30   Hearing, Vision, and Speech: Expression    Expression of Ideas and Wants Some difficulty   Hearing, Vision, and Speech: Understanding   Understanding Verbal/Non-Verbal Content Understands   Eating   Patient Performance Set up or Clean up assist   Oral Hygiene   Patient Performance Set up or Clean up assist   Wash Upper Body   Patient Performance Partial/moderate assist   Toileting Hygiene   Patient Performance Partial/moderate assist   Toilet Transfer   Patient Performance Partial/moderate assist   Discharge Goal (Admit only) setup or cleaning assist

## 2023-12-18 NOTE — PROGRESS NOTES
[facility]    Medicine Progress Note - Hospitalist Service    Date of Admission:  12/15/2023    Brief summary:  Priya Funez is a 83 year old female with a PMHx of HFpEF, severe TR, permanent AF s/p AVN ablation with PPM implant 1/11/19 s/p extraction TV (transvenous) PPM and implant leadless PPM 7/31/23, emergent aortic dissection repair 1/4/19, HTN, CKD, NEDA, and obesity who presented to [facility] as an outpatient for elective TVR done on 11/28/2023.  Immediate postoperative course was complicated by acute hypoxic and hypercapnic respiratory failure requiring BiPAP and probable pneumonia and treated with 7 days of IV Zosyn, patient was transferred to surgical telemetry floor on 12/2/2020.  On 12/6/2023 she was transferred back to CVICU after RRT for acute hypoxic respiratory distress and subsequent respiratory arrest with short CPR, re-intubation, and ROSC achieved. She sustained mildly displaced and nondisplaced right 3-5 rib fractures with associated right chest wall hematoma and nondisplaced anterolateral left 4th and 5th rib fractures. She was treated with meropenem 7 day course. She also has hx of recent C diff and multiple loose stools, for which she completed a 7 day po vancomycin course (per antimicrobial stewardship team, more likely to be colonization with recent history and no apparent treatment). Patient was transiently hyperglycemic and treated with insulin infusion then transitioned to sliding scale insulin per protocol. Blood sugars remained stable, has not required insulin coverage.  She remained intubated in the CVICU until undergoing tracheostomy by Dr. Pitts on 12/13/23, doing well with trach dome and PS currently.  She was treated for volume overload with diuretics (Bumex and chlorothiazide) with subsequent hypernatremia and hypotension requiring FWF and pressor support.  Spironolactone, torsemide and metoprolol discontinued and midodrine started with now stable blood pressures. Pre-op weight 70.9 kg,  discharge weight 74.1 kg (163 lbs) on 12/15/2023.  Patient was discharged to LTACH 12/15/2023 for continuing vent weaning, therapies     LTACH course:  12/17: Vitals stable.  Scheduled midodrine discontinued with BP stable in the 140s.  Diuretic therapy with torsemide restarted given peripheral edema and volume status, however at lower dose of 10 mg daily (home dose 40 mg daily) titrate slowly based on BP response.  Resume metoprolol for A-fib rate control as well gradually.  Labs reviewed, stable electrolytes, creatinine 0.82. ProBNP 3710, down from 90813 and 89777.  Hgb 8.0 from 7.6.  INR 0.98 from 1.12.  Continue heparin and Coumadin together for now until INR therapeutic.  Started hydroxyzine for anxiety 12/17.  A.m. labs ordered.      12/18:  increased torsemide to 20 mg po daily due to peripheral edema, having loose stools, will change tube feeds to higher fiber formula and add imodium daily and prn .    Assessment & Plan      #Acute respiratory failure and arrest on 12/6/23   #Acute hypercapnic and hypoxic respiratory failure requiring BiPAP  # s/p tracheostomy 12/13/23  # s/p Mechanical ventilation  #Aspiration pneumonitis vs pneumonia  #Possible aspiration event 12/1  #Near complete plugging of left lower lobe on CT   #Acute mildly displaced and nondisplaced right 3-5 rib fractures with associated right chest wall hematoma and nondisplaced anterolateral left fourth and fifth rib fractures  -Admit to LTACH 12/15/2023 to continue vent weaning, therapies  -Pulmonology consult for vent weaning process  -RT consult for tracheostomy cares, oxygenation, nebs, pulmonary therapies  - Tracheostomy sutures to be removed POD#5 (12/18/2023)  - Mucomyst, duoneb w/ RT Q4H   - Trach dome as much as tolerated   - Continue therapy with RT   - SLP speaking valve as tolerated      # S/p respiratory arrest 12/6  # Hx of severe TR s/p TVR  # HFpEF  # Hx of Afib s/p AVN ablation - PPM, upgraded to leadless 7/2023  #  Hypertension  # Hyperlipidemia  # Hx of aortic dissection repair 2019  # Respiratory arrest 12/6   # Elevated BNP, improving   - Stable paced rhythm with underlying atrial flutter. On review of cardiology, underlying aflutter/afib has been present since at least June of this year. Device RN adjusted PPM to PTA settings rate 60 at [facility].   - Pre-op 9/8/23 echo: LVEF 55/60%, mildly dilated RV, normal function  - Echo 12/4 with LVEF 60-65%, normal RV function, TV mean gradient 5 mmHg, no TR  - Echo 12/7 with no significant change from previous with the exception of mild reduction of RV. RV free wall dyskinesia.   - ASA 81 mg daily  -Hold scheduled midodrine 10 mg Q8H since BP now in the 140s systolic and stable on 12/16  -Continue midodrine 5 mg TID PRN for SBP<100   - Held PTA spironolactone, torsemide, metoprolol due to hypotension at prior hospital  - FLUID STATUS: Pre-op weight 70.9 kg, discharge weight 74.1 kg (163 lbs) on 12/15/2023 and 165 lbs on 12/16  -Gradually start diuretics, started torsemide 10 mg daily now since weight up, has 3+ edema peripheral edema (home dose torsemide 40 mg daily)  -proBNP trend appears improved from a peak of 57025 to now 3710.  -Monitor volume status daily and gradually titrate diuretics as appropriate  - Started Warfarin 12/15.  Pharmacy consulted for Coumadin dosing (INR goal 2-3)   -Continue heparin (DVT prophylaxis dose) and Coumadin together for now until INR therapeutic  -Continue telemetry monitoring     # Multiple loose stools   # Hx of C. Difficile (9/2023)  # Severe malnutrition in the context of acute illness  - Nutrition consulted for continuing tube feedings- will change to higher fiber tube feeds - on 12/18  - NJT and enteral feeds at goal (45 mL/hr)   - SLP: NPO  - PPI famotidine  - Bowel regimen discontinued due to continued loose stools  - Finished vancomycin po 7 day course   - Continue Banatrol  - PRN Zofran  4 mg PO or IV Q6H  - PRN Compazine 5 mg IV Q6H   - PRN  Imodium QID      #Hypernatremia   #Hypokalemia, resolved   #CKD stage 3  #Lactic acidosis, resolved  #IMAN on CKD, resolved   Baseline creat ~1.2-1.3   FLUID STATUS: Pre-op weight 70.9 kg, discharge weight 74.1 kg (163 lbs).   - Held PTA spironolactone 25 mg daily  - Held PTA torsemide   - Elevated sodium, Na 151 --> 147 on recheck, likely 2/2 aggressive diureses S/p bumex and chlorotiazide prior to transfer  -On free water flushes @45 mL Q1H, changed to 200 mL every 4 hourly on 12/16  - I/O has been inaccurate due to unmeasured stools and voids   - Replete lytes per protocol  - Avoid/limit nephrotoxins as able  - Strict I/O, daily weights, avoid/limit nephrotoxins     #Stress induced hyperglycemia  #T2DM  Hgb A1c 5.7%  - Medium Sliding scale insulin   - PTA Jardiance on hold, resume once medically optimized   - Goal BG <180 for optimal healing     # Stress induced leukocytosis  # Probable aspiration pneumonitis vs pneumonia, resolved  - 11/30 Zosyn 7-day course for pneumonia (finished)   - 12/6 Given further decompensation, Vanco and meropenem was started on 12/6   - Finished meropenem 7 day course (12/12)   - Finished Vancomycin PO 7 day course (for c-diff) (12/13)  - Blood culture with NGTD   - Urine with Candida and sputum with Yeast, likely both colonizations.   - Urine culture with NGTD     #Hx of recent C. Difficile  - C. Difficile toxin PCR positive 12/3 with negative antigen and negative toxin - per antimicrobial stewardship team, more likely to be colonization with recent history and no apparent treatment  - Finished oral vancomycin course on 12/13  - Monitor fever curve, WBC, and inflammatory markers as appropriate  - Enteric precautions per infection prevention protocol      # 3rd, 4th right rib fractures due to CPR  -Continue mini thoracotomy precautions  -Judicious use of narcotics, de-escalate as able    # Acute blood loss anemia  - Hgb has been stable.   -Hgb 7.6 on 12/16 and 8.0 on 12/17/2023  -Iron  "studies: Serum iron 29, iron binding capacity 216, saturation index 13%.  -Follow CBC with weekly labs.   -Transfuse if Hgb<7    #Acute postoperative pain  - PRN: Tylenol, oxycodone    #Hx of general anxiety disorder  - PTA Lexapro and Remeron  - Scheduled Melatonin HS     # Perineal irritation    - WOC consulted        Diet: Adult Formula Drip Feeding: Continuous Osmolite 1.5; Nasojejunal; Goal Rate: 45; mL/hr; Do not advance tube feeding rate unless K+ is = or > 3.0, Mg++ is = or > 1.5, and Phos is = or > 1.9    DVT Prophylaxis: Heparin SQ and Warfarin  Barrientos Catheter: Not present  Lines: PRESENT      PICC 12/07/23 Triple Lumen Right Basilic ok to use PICC-Site Assessment: WDL      Cardiac Monitoring: ACTIVE order. Indication: Tachyarrhythmias, acute (48 hours)  Code Status: Full Code      Clinically Significant Risk Factors              # Hypoalbuminemia: Lowest albumin = 2.7 g/dL at 12/16/2023  5:50 AM, will monitor as appropriate     # Hypertension: Noted on problem list    # Chronic heart failure with preserved ejection fraction: heart failure noted on problem list and last echo with EF >50%       # Overweight: Estimated body mass index is 29.1 kg/m  as calculated from the following:    Height as of this encounter: 1.613 m (5' 3.5\").    Weight as of this encounter: 75.7 kg (166 lb 14.2 oz)., PRESENT ON ADMISSION  # Severe Malnutrition: based on nutrition assessment, PRESENT ON ADMISSION     # Financial/Environmental Concerns:     # Pacemaker present  # History of CABG: noted on surgical history       Disposition Plan     Expected Discharge Date: 12/21/2023                    Clarissa Ndiaye MD  Hospitalist Service  LTACH  Securely message with [employer] (more info)  Text page via [facility] Paging/Directory   ______________________________________________________________________    Interval History   Still with increased lower extremity edema, asking for torsemide to be increased, reports +anxiety, will get dose " of atarax.      No chest pain.       Physical Exam   Vital Signs: Temp: 97.5  F (36.4  C) Temp src: Oral BP: (!) 149/65 Pulse: 60   Resp: 21 SpO2: 97 % O2 Device: Mechanical Ventilator Oxygen Delivery: 30 LPM  Weight: 166 lbs 14.21 oz  GENERAL: Alert, oriented, conversant, in no distress.   EYES: Normal conjunctiva. Sclera anicteric. Nonystagmus. Extraocular movements intact.   NECK: Supple, no lymph adenopathy. JVP is not distended.  Tracheostomy in place  NOSE: NJ tube in place  LUNGS: Clear to auscultation. No ronchi or crackles. Equal air entry bilaterally.   HEART: S1 S2, Rate and rhythm is regular. No murmurs  ABDOMEN: Soft, nontender, no distension. Bowel sounds are positive. No guarding or rebound.   EXTREMITIES: 2+ pitting edema  SKIN: No rash or ulcers.   NEUROLOGIC: Alert and oriented x3.  Mouthing words, communicates by writing appropriately.  Clear mentation. Motor, sensory and cranial exam is grossly intact andsymmetric.   PSYCHIATRIC: Normal affect and cognition       Medical Decision Making       51 MINUTES SPENT BY ME on the date of service doing chart review, history, exam, documentation & further activities per the note.      Data     I have personally reviewed the following data over the past 24 hrs:    5.6  \   7.4 (L)   / 253     N/A N/A N/A /  105 (H)   N/A N/A N/A \     ALT: N/A AST: N/A AP: N/A TBILI: N/A   ALB: N/A TOT PROTEIN: N/A LIPASE: N/A     Trop: N/A BNP: N/A     INR:  N/A PTT:  N/A   D-dimer:  N/A Fibrinogen:  N/A       Imaging results reviewed over the past 24 hrs:   No results found for this or any previous visit (from the past 24 hour(s)).  Recent Labs   Lab 12/18/23  0528 12/18/23  0517 12/17/23  2336 12/17/23  1754 12/17/23  1202 12/17/23  0600 12/16/23  0629 12/16/23  0550 12/15/23  1652 12/15/23  1538 12/15/23  1144 12/15/23  1132   WBC  --  5.6  --   --   --  8.0  --  8.6  --   --   --   --    HGB  --  7.4*  --   --   --  8.0*  --  7.6*  --   --   --   --    MCV  --  98  --    --   --  97  --  98  --   --   --   --    PLT  --  253  --   --   --  271  --  274  --   --   --   --    INR  --   --   --   --   --  0.98  --  1.12  --   --   --  1.03   NA  --   --   --   --   --  141  --  144  --  147*  147*  --   --    POTASSIUM  --   --   --   --   --  4.3  --  4.0  --  4.2  --   --    CHLORIDE  --   --   --   --   --  103  --  106  --  106  --   --    CO2  --   --   --   --   --  33*  --  34*  --  32*  --   --    BUN  --   --   --   --   --  32.5*  --  36.8*  --  42.1*  --   --    CR  --   --   --   --   --  0.82  --  0.79  --  0.81  --   --    ANIONGAP  --   --   --   --   --  5*  --  4*  --  9  --   --    BESS  --   --   --   --   --  9.1  --  8.1*  --  9.0  --   --    *  --  130* 125*   < > 119*   < > 118*   < > 131*   < >  --    ALBUMIN  --   --   --   --   --  2.9*  --  2.7*  --   --   --   --    PROTTOTAL  --   --   --   --   --  5.4*  --  4.8*  --   --   --   --    BILITOTAL  --   --   --   --   --  0.5  --  0.5  --   --   --   --    ALKPHOS  --   --   --   --   --  128  --  104  --   --   --   --    ALT  --   --   --   --   --  24  --  24  --   --   --   --    AST  --   --   --   --   --  25  --  22  --   --   --   --     < > = values in this interval not displayed.       Most Recent 3 CBC's:  Recent Labs   Lab Test 12/18/23  0517 12/17/23  0600 12/16/23  0550   WBC 5.6 8.0 8.6   HGB 7.4* 8.0* 7.6*   MCV 98 97 98    271 274     Most Recent 3 BMP's:  Recent Labs   Lab Test 12/18/23  0528 12/17/23  2336 12/17/23  1754 12/17/23  1202 12/17/23  0600 12/16/23  0629 12/16/23  0550 12/15/23  1652 12/15/23  1538   NA  --   --   --   --  141  --  144  --  147*  147*   POTASSIUM  --   --   --   --  4.3  --  4.0  --  4.2   CHLORIDE  --   --   --   --  103  --  106  --  106   CO2  --   --   --   --  33*  --  34*  --  32*   BUN  --   --   --   --  32.5*  --  36.8*  --  42.1*   CR  --   --   --   --  0.82  --  0.79  --  0.81   ANIONGAP  --   --   --   --  5*  --  4*  --  9   BESS  --    --   --   --  9.1  --  8.1*  --  9.0   * 130* 125*   < > 119*   < > 118*   < > 131*    < > = values in this interval not displayed.     Most Recent 2 LFT's:  Recent Labs   Lab Test 12/17/23  0600 12/16/23  0550   AST 25 22   ALT 24 24   ALKPHOS 128 104   BILITOTAL 0.5 0.5     Most Recent 3 INR's:  Recent Labs   Lab Test 12/17/23  0600 12/16/23  0550 12/15/23  1132   INR 0.98 1.12 1.03     Most Recent 3 BNP's:  Recent Labs   Lab Test 12/17/23  0600 12/09/23  0338 12/07/23  0128 12/06/23  1720 08/30/23  1406 06/23/21  0926   NTBNPI 3,710* 22,193* 30,691*  --   --   --    NTBNP  --   --   --  33,124* 3,249* 3,163*     Most Recent 6 glucoses:  Recent Labs   Lab Test 12/18/23  0528 12/17/23  2336 12/17/23  1754 12/17/23  1202 12/17/23  0600 12/17/23  0534   * 130* 125* 130* 119* 120*     Most Recent ABG:  Recent Labs   Lab Test 12/14/23  0745   PH 7.45   PO2 95   PCO2 44   HCO3 31*   FABIANA 6.1*     Most Recent ESR & CRP:  Recent Labs   Lab Test 12/06/23  1720   CRPI 73.40*

## 2023-12-18 NOTE — PLAN OF CARE
Goal Outcome Evaluation:           Overall Patient Progress: no changeOverall Patient Progress: no change    Outcome Evaluation: working on breathing today and bowels    Spiritual care consult requested Shruthi Messaged via teams.     Patient describes bubbles coming out the front area when she has BM, this is viewed by this writer. Diagnose potential for rectal-vagina fistula by wicking a 4x4 in vagina per WOC.  No treatment unless infection.    Patient frequently feels bloated and simethicone give some relief. Loose stools x 4 today small and awaiting imodium liquid at this time.    Work of breathing was difficult this AM and was put back on the vent for a few hours and that helped her quite a bit. Presented with anxiety during this time We are also tapering up Torsemide slowly. Telemetry monitor has been discontinued.  Gladys Holly RN

## 2023-12-18 NOTE — PLAN OF CARE
"  Problem: Artificial Airway  Goal: Effective Communication  Outcome: Progressing  Goal: Optimal Device Function  Outcome: Progressing  Goal: Absence of Device-Related Skin or Tissue Injury  Outcome: Progressing     Problem: Mechanical Ventilation Invasive  Goal: Effective Communication  Outcome: Progressing  Goal: Optimal Device Function  Outcome: Progressing  Goal: Mechanical Ventilation Liberation  Outcome: Progressing  Goal: Optimal Nutrition Delivery  Outcome: Progressing  Goal: Absence of Device-Related Skin and Tissue Injury  Outcome: Progressing  Goal: Absence of Ventilator-Induced Lung Injury  Outcome: Progressing  RT PROGRESS NOTE     DATA:     CURRENT SETTINGS: AC/18/390/+5             TRACH TYPE / SIZE:  #6 Shisarbjit TG 12/13/23             MODE:   AC             FIO2:   30%     ACTION:             THERAPIES:   ALB QID/MUCOMYST QID             SUCTION:                           FREQUENCY:  3                        AMOUNT:  small to moderate                         CONSISTENCY:   Thick                        COLOR:   white              SPONTANEOUS COUGH EFFORT/STRENGTH OF EFFORT (not elicited by suctioning): Yes:strong                              WEANING PHASE:   #2                        WEAN MODE:    30% and 30L/TM with cuff up days and full vent at night                        WEAN TIME:  first wean:  TM 30%/30L for 3 hrs and 39 min. Second wean: started @ 1520 PM.                         END WEAN REASON:   Ongoing      RESPONSE:             BS:   coarse              VITAL SIGNS:   Blood pressure 115/56, pulse 64, temperature 98.7  F (37.1  C), temperature source Oral, resp. rate 22, height 1.613 m (5' 3.5\"), weight 75.1 kg (165 lb 9.6 oz), SpO2 100%, not currently breastfeeding.              EMOTIONAL NEEDS / CONCERNS:  N/A                RISK FOR SELF DECANNULATION:  N/A                        RISK DUE TO:                          INTERVENTION/S IN PLACE IS/ARE:  N/A       NOTE / PLAN: Patient has been " weaning on HF/TM 30%/30L - since 1520 PM and is tolerating well. Patient weaned on TM 30%/30L for 3 hrs and 39 minutes and the weaning terminated due to increased WOB/anxiety. Patient had on PMV for 30 min and tolerated well. Sutures removed without problems. Plan: will place on Vent @ [facility].

## 2023-12-18 NOTE — PROGRESS NOTES
Care Management Initial Consult    General Information    (Copied from H & P):  Priya Funez is a 83 year old female with a PMHx of HFpEF, severe TR, permanent AF s/p AVN ablation with PPM implant 1/11/19 s/p extraction TV (transvenous) PPM and implant leadless PPM 7/31/23, emergent aortic dissection repair 1/4/19, HTN, CKD, NEDA, and obesity who presented to [facility] as an outpatient for elective TVR done on 11/28/2023.  Immediate postoperative course was complicated by acute hypoxic and hypercapnic respiratory failure requiring BiPAP and probable pneumonia and treated with 7 days of IV Zosyn, patient was transferred to surgical telemetry floor on 12/2/2020.  On 12/6/2023 she was transferred back to CVICU after RRT for acute hypoxic respiratory distress and subsequent respiratory arrest with short CPR, re-intubation, and ROSC achieved. She sustained mildly displaced and nondisplaced right 3-5 rib fractures with associated right chest wall hematoma and nondisplaced anterolateral left 4th and 5th rib fractures. She was treated with meropenem 7 day course. She also has hx of recent C diff and multiple loose stools, for which she completed a 7 day po vancomycin course (per antimicrobial stewardship team, more likely to be colonization with recent history and no apparent treatment). Patient was transiently hyperglycemic and treated with insulin infusion then transitioned to sliding scale insulin per protocol. Blood sugars remained stable, has not required insulin coverage.  She remained intubated in the CVICU until undergoing tracheostomy by Dr. Pitts on 12/13/23, doing well with trach dome and PS currently.  She was treated for volume overload with diuretics (Bumex and chlorothiazide) with subsequent hypernatremia and hypotension requiring FWF and pressor support.  Spironolactone, torsemide and metoprolol discontinued and midodrine started with now stable blood pressures. Pre-op weight 70.9 kg, discharge weight 74.1 kg  (163 lbs) on 12/15/2023.  [facility] for continuing vent weaning, therapies      Assessment completed with: Patient, Family, Spouse, Daughter  Type of CM/SW Visit: CM Role Introduction    Primary Care Provider verified and updated as needed: Yes   Readmission within the last 30 days: no previous admission in last 30 days      Reason for Consult: discharge planning  Advance Care Planning: Advance Care Planning Reviewed: present on chart          Communication Assessment  Patient's communication style: spoken language (English or Bilingual)    Hearing Difficulty or Deaf: no   Wear Glasses or Blind: no    Cognitive  Cognitive/Neuro/Behavioral: WDL  Level of Consciousness: alert  Arousal Level: opens eyes spontaneously  Orientation: oriented x 4  Mood/Behavior: calm, cooperative     Speech: unable to speak, trached    Living Environment:   People in home: spouse     Current living Arrangements: house      Able to return to prior arrangements: other (see comments)  Living Arrangement Comments: to be determined    Family/Social Support:  Care provided by: self  Provides care for: no one  Marital Status:   , Children  Genaro       Description of Support System: Involved, Supportive         Current Resources:   Patient receiving home care services: No     Community Resources: None  Equipment currently used at home: walker, rolling  Supplies currently used at home: None    Employment/Financial:  Employment Status: retired        Financial Concerns: none           Does the patient's insurance plan have a 3 day qualifying hospital stay waiver?  No    Lifestyle & Psychosocial Needs:  Social Determinants of Health     Food Insecurity: Low Risk  (10/10/2023)    Food Insecurity     Within the past 12 months, did you worry that your food would run out before you got money to buy more?: No     Within the past 12 months, did the food you bought just not last and you didn t have money to  get more?: No   Depression: Not at risk (11/7/2023)    PHQ-2     PHQ-2 Score: 0   Housing Stability: Low Risk  (10/10/2023)    Housing Stability     Do you have housing? : Yes     Are you worried about losing your housing?: No   Tobacco Use: Low Risk  (12/16/2023)    Patient History     Smoking Tobacco Use: Never     Smokeless Tobacco Use: Never     Passive Exposure: Not on file   Financial Resource Strain: Low Risk  (10/10/2023)    Financial Resource Strain     Within the past 12 months, have you or your family members you live with been unable to get utilities (heat, electricity) when it was really needed?: No   Alcohol Use: Not At Risk (1/4/2019)    AUDIT-C     Frequency of Alcohol Consumption: Never     Average Number of Drinks: Not on file     Frequency of Binge Drinking: Not on file   Transportation Needs: Low Risk  (10/10/2023)    Transportation Needs     Within the past 12 months, has lack of transportation kept you from medical appointments, getting your medicines, non-medical meetings or appointments, work, or from getting things that you need?: No   Physical Activity: Not on file   Interpersonal Safety: Not on file   Stress: Not on file   Social Connections: Not on file       Functional Status:  Prior to admission patient needed assistance:   Dependent ADLs:: Ambulation-walker  Dependent IADLs:: Cleaning, Cooking, Laundry, Shopping, Meal Preparation, Medication Management, Money Management       Mental Health Status:  Mental Health Status: No Current Concerns       Chemical Dependency Status:                Values/Beliefs:  Spiritual, Cultural Beliefs, Sabianism Practices, Values that affect care: no               Additional Information:  Met with patient, her , Genaro, and dtulysses Richards this am.  Discussed that we will have a care progression meeting after New Years to discuss how patient is doing. Shared CC's contact information with patient and family.  We have to wait and see how patient does with  her trach and NJ tube.  Did clarify that patient lives in a rambler type home, and that she used a walker when she was home.  Patient worked for 38 years as a  for [employer].  Laundry is in the basement, she might not be able to access when she gets home.  Patient has 1 daughter and 3 grown sons. Patient and her  have been living in their own home with minimal assist from their family. Will continue to follow patient during her LTACH admission.    Caroline Willett RN, BSN  Care Coordination  [facility]  [phone]

## 2023-12-18 NOTE — PROGRESS NOTES
"SPEAKING VALVE ASSESSMENT     12/18/23 7424   Appointment Info   Signing Clinician's Name / Credentials (SLP) Angella Ni [facility]   General Information   Onset of Illness/Injury or Date of Surgery 11/28/23   Referring Physician Trevin Kline MD   Patient/Family Therapy Goal Statement (SLP) To communicate verbally.   Pertinent History of Current Problem Per pulmonology note: \"83 yoF with PMH of HFpEF, severe TR, permanent AF s/p ablation & leadless PPM 7/31/23, emergent aortic dissection repair 1/2019, HTN, CKD, NEDA, and obesity who presented as an outpatient for elective TVR completed on 11/28/2023. Subsequently had respiratory arrest on 12/6 with short CPR prior to ROSC. Treated for presumed pneumonia with multiple courses of abx, now off.  S/p trach on 12/13. She has been progressing with trach dome and intermittently on PS. Fluid overloaded treated with diuretics.\"   General Observations Patient up in chair, alert, interactive. Patient writing messages effectively to communicate.   Type of Evaluation   Type of Evaluation Artificial Airway (Speaking Valve)   Intubation History (Speaking Valve)   Date of Intubation (Speaking Valve) 12/06/23  (re-intubation)   Type of Intubation (Speaking Valve) emergency   Tracheostomy Assessment (Speaking Valve)   Type, Tracheostomy Tube Shiley   Tube Size, Tracheostomy 6 TG   Cuff, Tracheostomy Tube cuffed, inflated   Date of Tracheostomy 12/13/23   Participation Ability (Speaking Valve) awake/alert;attempts to communicate, mouthing words;attempts to communicate nonverbally;follows simple commands   Comment, Tracheostomy (Speaking Valve) RT switched patient from vent to HFTD during session this PM. Patient had been on HFTD this AM but changed to full vent support per patient/family request due to SOB.   Respiratory Status (Speaking Valve)   Oxygen Supply Trach Dome  (30L/36% FIO2)   Oral/Tracheal Secretions (Speaking Valve)   Oral Secretions (Speaking Valve Assessment) " "minimal secretions   Tracheal Secretions (Speaking Valve Assessment) minimal secretions   Recent Diagnostic Results (Speaking Valve)   Chest X-Ray, Date and Results (Speaking Valve) CXR 12/14/2023: \"Similar perihilar and bibasilar opacities, favors  atelectasis/edema with right-sided pleural effusion.\"   Speaking Valve Trials (Speaking Valve)   Cuff Inflated at Onset of Evaluation Yes   Orders received to deflate cuff for PMSV trial Yes   Oxygen saturation before cuff deflation 100 %   Set-up/Cuff Deflation (Speaking Valve Trial) cuff deflated, total;tolerance with no vital sign changes;tolerance, adequate airflow   Oxygen saturation after cuff deflation 100 %   Secretions/Suction, Cuff Deflation (Speaking Valve) tracheal suctioning post cuff deflation   Airflow/Phonation Air flow around trach adequate with finger occlusion;Back pressure evident   Speaking Valve placed on tracheostomy tube;vital signs monitored throughout trials   Oxygen saturation with PMSV placement 100 %   Breath Support (Speaking Valve Trial) exhales through mouth;coordinates speech with breath support   Voice Production (Speaking Valve Trial) voicing achieved;good strength/quality;good ability to change pitch   Secretions During Valve Use (Speaking Valve Trial) secretions stable during valve use   Outcome of Trial (Speaking Valve) tolerance is good;speaking valve removed;cuff reinflated  (at end of trial, PMSV removed and cuff re-inflated)   Total amount of time with PMSV placement: 25 minutes   Recommendations (Speaking Valve Trials) speaking valve use recommended;maintain speaking valve warning labels on cuff inflation line   Clinical Impression   Criteria for Skilled Therapeutic Interventions Met (SLP Eval) Yes, treatment indicated   SLP Diagnosis aphonia due to trach   Risks & Benefits of therapy have been explained evaluation/treatment results reviewed;care plan/treatment goals reviewed;risks/benefits reviewed;current/potential barriers " reviewed;participants voiced agreement with care plan;participants included;patient   Clinical Impression Comments Speaking valve assessment completed per MD order. Patient tolerated 25 minutes  with PMSV placement while on HFTD (30L/36% FIO2) and achieved good voicing for conversation. SATS remained stable at 100% throughout trial. PMSV removed at end of session and cuff re-inflated. Recommend continued PMSV use as tolerated when on TD (defer to pulmonology team for timing). Trach cuff must be fully deflated for PMSV use. Handoff completed with RT via Prompt.ly. Discussed results with pulmonology team.   SLP Total Evaluation Time   Eval: use and/or fitting of voice prosthetic device to supp speech (not aug. comm) Minutes (04661) 10   Speech, Language, Voice Communication&/or Auditory Processing   Treatment of Speech, Language, Voice Communication&/or Auditory Processing Minutes (22980) 10           SLP Discharge Planning   SLP Plan f/u BDT from 12/18 for delayed aspiration signs; PMSV trials on HFTD (anticipate will tolerate well); BSS/VFSS once tolerating PMSV for longer periods of time   SLP Discharge Recommendation [facility] patient will benefit from intensive, interdisciplinary therapy.  Anticipate will be able to tolerate 3 hours of therapy per day   SLP Rationale for DC Rec Continued SLP for dysphagia and communication.   SLP Brief overview of current status  Blue Dye Test and speaking valve assessment completed per MD orders. No blue dye observed at trach stoma site. Upon tracheal suctioning, no immediate dye suctioned tracheally. RT and RN to monitor for delayed evidence of aspiration of secretions. Recommend continued PMSV use as tolerated when on TD (defer to pulmonology team for timing). Trach cuff must be fully deflated for PMSV use. SLP to continue to follow.   Total Session Time   Total Session Time (sum of timed and untimed services) 20

## 2023-12-18 NOTE — CONSULTS
Consultation - Pulmonary Medicine  Priya Funez,  1940, MRN [id number]    Acute respiratory failure (H) [J96.00]   Code status:  Full Code       Extended Emergency Contact Information  Primary Emergency Contact: Raphael Funez  Address: [address] [place] [address] [facility]  Home Phone: [phone]  Mobile Phone: [phone]  Relation: Spouse  Secondary Emergency Contact: Susie Ceballos  Address: [address] [place] [address] [facility]  Mobile Phone: [phone]  Relation: Daughter       Impressions:   Principal Problem:    Acute respiratory failure (H)  Active Problems:    Chronic diastolic heart failure (H)    Benign essential hypertension    Chronic kidney disease, stage III (moderate) (H)    Severe tricuspid regurgitation    Cardiac pacemaker in situ    Current use of long term anticoagulation    S/P tricuspid valve replacement    83 yoF with PMH of HFpEF, severe TR, permanent AF s/p ablation & leadless PPM 23, emergent aortic dissection repair 2019, HTN, CKD, NEDA, and obesity who presented as an outpatient for elective TVR completed on 2023. Subsequently had respiratory arrest on  with short CPR prior to ROSC. Treated for presumed pneumonia with multiple courses of abx, now off.  S/p trach on . She has been progressing with trach dome and intermittently on PS. Fluid overloaded treated with diuretics.    Problems:  Acute hypercapnic and hypoxic respiratory failure s/p trach: CXR -bibasilar opacities and suspected moderate right pleural effusion   Tracheostomy in place: 6 Shiley placed . Surgery more complex d/t scarred tissue from previous trach in 2019  Dysphagia s/p NJ  Aspiration pneumonitis vs pneumonia - off antibiotics   Respiratory/cardiac arrest on  possibly d/t mucus plugging.  Multiple rib fx d/t CPR  Hx NEDA  Hx of severe TR s/p TVR, HFpEF  Hx of Afib s/p AVN ablation - leadless PPM 2023: on  coumadin       Recommendations and Plans:   Phase 2 weans: TM/cuff up day as tolerates.  AC night  BDT and PMV trial with SLP  Nebulized albuterol & mucomyst QID  Continue torsemide 20 daily(increased 12/18 in setting of peripheral edema) - may require further adjustment  VAP ppx: chlorhexidine BID, HOB 30 degrees  GI ppx: famotidine   DVT ppx: heparin subcutaneous and coumadin   Ok to remove trach sutures   Routine trach care per protocol  Earliest first trach change next week   PT/OT/SLP   Repeat CxR tomorrow to follow up on RLL opacity, may require a thora         Chief Complaint Acute respiratory failure (H)       HPI   I have been asked by Dr. Ndiaye to see Priya Funez in consultation for trach and ventilator management.    Priya Funez is a 83 year old year old female admitted to [facility] on 12/15/2023 for ongoing treatment of respiratory failure.    The referring facility is [facility].  Records from that facility are available to assist in historical details.  Further history is provided by .    83 year old female with a past medical history of HFpEF, severe TR, permanent AF s/p AVN ablation (leadless PPM 7/31/23), emergent aortic dissection repair 1/4/19, HTN, CKD, NEDA, and obesity who presented as an outpatient for elective TVR. Patient is now s/p mini TVR on 11/28/2023. Started on Zosyn(7d course) for probable pneumonia and aspiration event on 12/1. Worsening hypoxia & distress on 12/6; subsequently had respiratory arrest with short CPR prior to ROSC. Near complete plugging of left lower lobe on CT; possible etiology for arrest. Intubated and transferred back to ICU. Bronch on 12/7 (+)Candida albicans. Treated with merrem for 7d.  Also treated for C.diff with PO vanco. ENT consulted for tracheostomy placement due to concerns for mucus plugging prior to arrest, now with rib fractures that will further impede ability to manage respiratory secretions. S/p tracheostomy on 12/13.  She has been progressing with trach dome and intermittently on PS.  The patient was fluid overloaded and treated with diuretics. She remains up from preoperative weight and ongoing diuresis was complicated by mild hypernatremia, which has been improving with free water flushes.       Transferred to LTAC on 12/15 on AC 18/390/5/30%.  Yesterday she was on 40L/30%trach dome for ~16hr.  Today she was on trach dome but had increased wob and anxiety, placed back on the vent with improvement.  Denies dyspnea, n/v, pain currently.  +bloating, PRN simethicone ordered.  Writes well to communicate.  Follows commands.   at bedside.  No underlying lung disease, non-smoker.      [facility]  [facility]  @[email or web address]@ Social History  Reviewed, and she  reports that she has never smoked. She has never used smokeless tobacco. She reports that she does not drink alcohol and does not use drugs.    Smoking history:   History   Smoking Status    Never   Smokeless Tobacco    Never    Family History  Reviewed, and family history includes Ataxia in her mother; Heart Disease in her father.   Allergies  Allergies   Allergen Reactions    Bactrim [Sulfamethoxazole-Trimethoprim]      pancytopenia    Amoxicillin Swelling     Face and body    Ciprofloxacin Hives              Current Medications:    acetylcysteine  2 mL Nebulization 4x Daily    albuterol  2.5 mg Nebulization 4x Daily    aspirin  81 mg Oral or Feeding Tube Daily    chlorhexidine  15 mL Mouth/Throat Q12H    escitalopram  10 mg Oral or Feeding Tube Daily    famotidine  20 mg Oral or Feeding Tube Daily    fiber modular  2 packet Per Feeding Tube BID    heparin ANTICOAGULANT  5,000 Units Subcutaneous Q8H NOE    insulin aspart  1-6 Units Subcutaneous Q6H    Melatonin  5 mg Per Feeding Tube QPM    mirtazapine  15 mg Oral or Feeding Tube At Bedtime    multivitamins w/minerals  15 mL Per Feeding Tube Daily    protein modular  1 packet Per Feeding Tube Daily    saccharomyces  boulardii  250 mg Oral or Feeding Tube BID    sodium chloride (PF)  10 mL Intracatheter Q8H    [START ON 12/19/2023] torsemide  20 mg Per Feeding Tube Daily    Warfarin Therapy Reminder  1 each Oral See Admin Instructions          Review of Systems:  A 10-system review was obtained and is negative with the exception of the symptoms noted above. Physical Exam:  Temp:  [97.5  F (36.4  C)-98.5  F (36.9  C)] 97.8  F (36.6  C)  Pulse:  [60-68] 68  Resp:  [20-32] 20  BP: (117-149)/(57-67) 133/59  FiO2 (%):  [24 %-30 %] 30 %  SpO2:  [96 %-100 %] 96 %  [unfilled]  Ventilator settings:   Vent Mode: CMV/AC  (Continuous Mandatory Ventilation/ Assist Control)  FiO2 (%): 30 %  Resp Rate (Set): 18 breaths/min  Tidal Volume (Set, mL): 390 mL  PEEP (cm H2O): 5 cmH2O  Resp: 20      EXAM:  Physical Exam  Gen: NAD on AC  HEENT: NT, trach midline/intact, sutures in place  CV: RRR, no m/g/r  Resp: CTAB; diminished, non-labored   Abd: soft, nontender, BS+  Skin: no rashes or lesions  Ext: 2-3+ b/l lower extremity edema  Neuro: alert, follows commands, mouths words        Pertinent Labs:  Lab Results: personally reviewed.   Recent Labs   Lab 12/18/23  0517   WBC 5.6   HGB 7.4*   HCT 24.0*        Recent Labs   Lab 12/18/23  0517 12/17/23  0600 12/16/23  0550    141 144   CO2 34* 33* 34*   BUN 28.4* 32.5* 36.8*   ALKPHOS 122 128 104   ALT 20 24 24   AST 22 25 22        Pertinent Radiology:  Radiology results: images and reports personally viewed; radiology read below     Chest radiograph 12/14/2023                                                             1.  Support devices and postoperative changes are stable.  2.  Similar perihilar and bibasilar opacities, favors  atelectasis/edema with right-sided pleural effusion.    CT CHEST ABDOMEN PELVIS W/O CONTRAST, 12/6/2023  1. Limited noncontrast examination with grossly stable appearance of  type A aortic dissection repair with dissection flap extending from  the distal aspect  of the repair through the abdominal aortic  bifurcation.  2. Acute mildly displaced and nondisplaced right 3-5 rib fractures  with associated right chest wall hematoma and nondisplaced  anterolateral left fourth and fifth rib fractures, presumably related  to cardiopulmonary resuscitative efforts.   3. Near complete atelectasis of the left lower lobe associated with  extensive and bronchial mucous plugging; consider aspiration.  Subsegmental atelectasis of the right lower lobe. Additional scattered  groundglass and consolidative opacities throughout the left upper lobe  may represent edema and/or infection.  4. Trace bilateral pleural effusions.  5. 3.5 cm right groin collection, presumably a post vascular access  hematoma.  5. Left pectineus intramuscular hematoma.  6. No acute intra-abdominal pathology suspected.  7. Endotracheal tube terminates at the keegan. Recommend retraction.        Other pertinent data:  Echocardiogram 12/7/2023  S/P Right minithoracotomy tricuspid valve replacement with 31mm [employer]ert EPIC  porcine bioprosthetic valve, PFO closure on 11/28/2023. Valve is well seated  with mean gradient of 5mmHg at 74bpm.  Global and regional left ventricular function is normal with an EF of 55-60%.  Global right ventricular function is mildly reduced.  On direct visual comparison with study from 12/4/23, there is no significant  difference in ventricular function or tricuspid valve doppler assessment.  ____________________________________________________________      Tracheostomy tube data:  Date of initial placement: 12/13/2023  Current tube - type: Shiley , size: 6       Extensive record review is performed.  Key information about patient is reviewed in detail.  The respiratory plan of care is discussed with RT.  This patient will be rounded on regularly while requiring mechanical ventilator support.  Please contact us with questions or concerns.    Total time spent on pt examination and coordination of  care was 60min   Matt Kennedy CNP  Pulmonary Medicine  [facility]  Pager [phone]

## 2023-12-18 NOTE — PROGRESS NOTES
NUTRITION BRIEF NOTE     Chart check for nutrition support patient.     Current Nutrition Support Orders:   ND placed 12/8- distal duodenum  Osomolite 1.5 at 45 ml/hr + 1 prosource TF20, 4 banatrolTF and 200 ml H20 q 6 hr    Provides:   (1080ml/day) 1700 kcals (29 kcal/kg), 87 g PRO (1.5 g/kg), 822 ml free H20, 219 g CHO, and 0 g fiber daily.   Total H20 1622 ml     Updates:   Patient having many loose stools; 5-7/day past 2 days, will switch to higher fiber TF formula and monitor for improvement in stooling.    Recommendations:   Jevity 1.5 @ goal of  50ml/hr x22 hrs (1100ml/day) + 1 pkt Prosource TF20 provides: 1730 kcals, 90 g PRO, 836 ml free H20, 238 g CHO, and 23 g fiber daily.    Will continue to follow per protocol. Please page/consult as needed.     Angelita Smith, HEMALN, LD

## 2023-12-18 NOTE — PLAN OF CARE
Problem: Mechanical Ventilation Invasive  Goal: Effective Communication  12/17/2023 0020 by Allison Casarez, RT  Outcome: Progressing  12/17/2023 0019 by Allison Casarez, RT  Outcome: Progressing  Goal: Optimal Device Function  12/17/2023 0020 by Allison Casarez, RT  Outcome: Progressing  12/17/2023 0019 by Allison Casarez, RT  Outcome: Progressing  Intervention: Optimize Device Care and Function  Recent Flowsheet Documentation  Taken 12/16/2023 2225 by Allison Casarez, RT  Airway Safety Measures: all equipment/monitors on and audible  Taken 12/16/2023 2221 by Allison Casarez, RT  Airway Safety Measures: all equipment/monitors on and audible  Goal: Mechanical Ventilation Liberation  Outcome: Progressing  Goal: Optimal Nutrition Delivery  12/17/2023 0020 by Allison Casarez, RT  Outcome: Progressing  12/17/2023 0019 by Allison Casarez, RT  Outcome: Progressing  Goal: Absence of Device-Related Skin and Tissue Injury  12/17/2023 0020 by Allison Casarez, RT  Outcome: Progressing  12/17/2023 0019 by Allison Casarez, RT  Outcome: Progressing  Goal: Absence of Ventilator-Induced Lung Injury  12/17/2023 0020 by Allison Casarez, RT  Outcome: Progressing  12/17/2023 0019 by Allison Casarez, RT  Outcome: Progressing   RT PROGRESS NOTE   [id number]  DATA:     CURRENT SETTINGS:             TRACH TYPE / SIZE: #6 Shiley, placed on 12/13/23, sutures in place              MODE:  AC 18 390 +5  24%             FIO2:        ACTION:             THERAPIES: Albuterol/Mucomyst neb QID             SUCTION:                           FREQUENCY:  X4 or white to yellow thickish secr.                         AMOUNT:                           CONSISTENCY:                           COLOR:                SPONTANEOUS COUGH EFFORT/STRENGTH OF EFFORT (not elicited by suctioning):                               WEANING PHASE: 2                          WEAN MODE:  TM 30% 30L for 17hr 50 min, marbella well                           WEAN TIME:                            END WEAN REASON:   vent at noc     RESPONSE:             BS:                VITAL SIGNS:                EMOTIONAL NEEDS / CONCERNS:                  RISK FOR SELF DECANNULATION:                          RISK DUE TO:                          INTERVENTION/S IN PLACE IS/ARE:         NOTE / PLAN:   Goal Outcome Evaluation:      Cont to monitor and treat

## 2023-12-19 NOTE — PLAN OF CARE
Problem: Diarrhea  Goal: Effective Diarrhea Management  Outcome: Not Progressing  Intervention: Manage Diarrhea  Recent Flowsheet Documentation  Taken 12/19/2023 0920 by Darcy Bean, RN  Fluid/Electrolyte Management: fluids provided  Perineal Care:   perineal hygiene encouraged   perineum cleansed     Problem: Adult Inpatient Plan of Care  Goal: Optimal Comfort and Wellbeing  Outcome: Progressing  Intervention: Provide Person-Centered Care  Recent Flowsheet Documentation  Taken 12/19/2023 0920 by Darcy Baen RN  Trust Relationship/Rapport: care explained     Problem: Enteral Nutrition  Goal: Feeding Tolerance  Outcome: Progressing     Problem: Anxiety Signs/Symptoms  Goal: Optimized Energy Level (Anxiety Signs/Symptoms)  Outcome: Progressing   Goal Outcome Evaluation:       Patient is alert, able to communicate needs. Has been incontinent of bowel and bladder, said that she has no control over the loose stools, thinks the diarrhea is related to the tube feeding that she is getting through nasogastric tube, wandering when the feeding will stop. She has a trach tube, on mechanical ventilation, still working on vent weaning before she can get swallow evaluation. Was anxious when she woke up, complained of itching around andriy area where she has skin irritation. Given atarax, assisted with andriy care, applied moist barrier cream to affected skin, encouraged to get out of bed, transferred to chair with 1 assist. Tolerated cares.

## 2023-12-19 NOTE — PLAN OF CARE
Problem: Adult Inpatient Plan of Care  Goal: Plan of Care Review  Description: The Plan of Care Review/Shift note should be completed every shift.  The Outcome Evaluation is a brief statement about your assessment that the patient is improving, declining, or no change.  This information will be displayed automatically on your shift  note.  Outcome: Progressing  Goal: Optimal Comfort and Wellbeing  Outcome: Progressing  Intervention: Provide Person-Centered Care  Recent Flowsheet Documentation  Taken 12/18/2023 1600 by Tami Suarez RN  Trust Relationship/Rapport:   care explained   choices provided   emotional support provided  Goal: Readiness for Transition of Care  Outcome: Progressing     Problem: Enteral Nutrition  Goal: Absence of Aspiration Signs and Symptoms  Outcome: Progressing     Problem: Anxiety Signs/Symptoms  Goal: Optimized Energy Level (Anxiety Signs/Symptoms)  Outcome: Progressing   Goal Outcome Evaluation:  Pt alert and oriented x4.pleasant. Trach intact. R/A VSS. Pt had 2 loose stools for the evening shift. Voided in commode x1. Continues on TF continuous Osmolite 1.5 @ 50 ml/hr. Mg K and Phos protocol- Rechecks in the AM.  BG check WNL. No insulin coverage needed. Pt denies pain all shift. No further concerns at this time. Will continue to monitor closely.

## 2023-12-19 NOTE — PLAN OF CARE
"Problem: Artificial Airway  Goal: Effective Communication  Outcome: Progressing  Goal: Optimal Device Function  Outcome: Progressing  Goal: Absence of Device-Related Skin or Tissue Injury  Outcome: Progressing  Problem: Mechanical Ventilation Invasive  Goal: Effective Communication  Outcome: Progressing  Goal: Optimal Device Function  Outcome: Progressing  Intervention: Optimize Device Care and Function  Recent Flowsheet Documentation  Taken 12/19/2023 0017 by Haim Mix RT  Airway Safety Measures: all equipment/monitors on and audible  Taken 12/18/2023 2229 by Haim Mix RT  Airway Safety Measures: all equipment/monitors on and audible  Taken 12/18/2023 2036 by Haim Mix RT  Airway Safety Measures: all equipment/monitors on and audible    RT PROGRESS NOTE     DATA:     CURRENT SETTINGS: 18, 390, +5             TRACH TYPE / SIZE: #6 Flip, placed on 12/13             MODE: AC             FIO2: 24%     ACTION:             THERAPIES: Albuterol and mucomyst QID             SUCTION:                           FREQUENCY: X2                        AMOUNT: small                        CONSISTENCY: thick                        COLOR: pale yellow             SPONTANEOUS COUGH EFFORT/STRENGTH OF EFFORT (not elicited by suctioning): good productive              WEANING PHASE: 2                        WEAN MODE: 30%/30L TM (CUFF UP)                        WEAN TIME: 7HRS                        END WEAN REASON: AC for night      RESPONSE:             BS: Coarse              VITAL SIGNS: /63 (BP Location: Left arm)   Pulse 60   Temp 98.2  F (36.8  C) (Oral)   Resp 24   Ht 1.613 m (5' 3.5\")   Wt 75.1 kg (165 lb 9.6 oz)   SpO2 98%   BMI 28.87 kg/m                  EMOTIONAL NEEDS / CONCERNS:                  RISK FOR SELF DECANNULATION:                          RISK DUE TO:                          INTERVENTION/S IN PLACE IS/ARE:         NOTE / PLAN:  TM with cuff up days and AC at night.             "

## 2023-12-19 NOTE — PROGRESS NOTES
[facility]    Medicine Progress Note - Hospitalist Service    Date of Admission:  12/15/2023    Brief summary:  Priya Funez is a 83 year old female with a PMHx of HFpEF, severe TR, permanent AF s/p AVN ablation with PPM implant 1/11/19 s/p extraction TV (transvenous) PPM and implant leadless PPM 7/31/23, emergent aortic dissection repair 1/4/19, HTN, CKD, NEDA, and obesity who presented to [facility] as an outpatient for elective TVR done on 11/28/2023.  Immediate postoperative course was complicated by acute hypoxic and hypercapnic respiratory failure requiring BiPAP and probable pneumonia and treated with 7 days of IV Zosyn, patient was transferred to surgical telemetry floor on 12/2/2020.  On 12/6/2023 she was transferred back to CVICU after RRT for acute hypoxic respiratory distress and subsequent respiratory arrest with short CPR, re-intubation, and ROSC achieved. She sustained mildly displaced and nondisplaced right 3-5 rib fractures with associated right chest wall hematoma and nondisplaced anterolateral left 4th and 5th rib fractures. She was treated with meropenem 7 day course. She also has hx of recent C diff and multiple loose stools, for which she completed a 7 day po vancomycin course (per antimicrobial stewardship team, more likely to be colonization with recent history and no apparent treatment). Patient was transiently hyperglycemic and treated with insulin infusion then transitioned to sliding scale insulin per protocol. Blood sugars remained stable, has not required insulin coverage.  She remained intubated in the CVICU until undergoing tracheostomy by Dr. Pitts on 12/13/23, doing well with trach dome and PS currently.  She was treated for volume overload with diuretics (Bumex and chlorothiazide) with subsequent hypernatremia and hypotension requiring FWF and pressor support.  Spironolactone, torsemide and metoprolol discontinued and midodrine started with now stable blood pressures. Pre-op weight 70.9 kg,  discharge weight 74.1 kg (163 lbs) on 12/15/2023.  Patient was discharged to LTACH 12/15/2023 for continuing vent weaning, therapies     LTACH course:  12/17: Vitals stable.  Scheduled midodrine discontinued with BP stable in the 140s.  Diuretic therapy with torsemide restarted given peripheral edema and volume status, however at lower dose of 10 mg daily (home dose 40 mg daily) titrate slowly based on BP response.  Resume metoprolol for A-fib rate control as well gradually.  Labs reviewed, stable electrolytes, creatinine 0.82. ProBNP 3710, down from 16780 and 52681.  Hgb 8.0 from 7.6.  INR 0.98 from 1.12.  Continue heparin and Coumadin together for now until INR therapeutic.  Started hydroxyzine for anxiety 12/17.  A.m. labs ordered.      12/18:  increased torsemide to 20 mg po daily due to peripheral edema, having loose stools, will change tube feeds to higher fiber formula and add imodium daily and prn .    12/19:  increase toresemide to 40 mg po daily as edema is still severe, increase imodium due to loose stools, changed heparin to lovenox for pt comfort    Assessment & Plan      #Acute respiratory failure and arrest on 12/6/23   #Acute hypercapnic and hypoxic respiratory failure requiring BiPAP  # s/p tracheostomy 12/13/23  # s/p Mechanical ventilation  #Aspiration pneumonitis vs pneumonia  #Possible aspiration event 12/1  #Near complete plugging of left lower lobe on CT   #Acute mildly displaced and nondisplaced right 3-5 rib fractures with associated right chest wall hematoma and nondisplaced anterolateral left fourth and fifth rib fractures  -Admit to LTACH 12/15/2023 to continue vent weaning, therapies  -Pulmonology consult for vent weaning process  -RT consult for tracheostomy cares, oxygenation, nebs, pulmonary therapies  - Tracheostomy sutures to be removed POD#5 (12/18/2023)  - Mucomyst, duoneb w/ RT Q4H   - Trach dome as much as tolerated   - Continue therapy with RT   - SLP speaking valve as  tolerated      # S/p respiratory arrest 12/6  # Hx of severe TR s/p TVR  # HFpEF  # Hx of Afib s/p AVN ablation - PPM, upgraded to leadless 7/2023  # Hypertension  # Hyperlipidemia  # Hx of aortic dissection repair 2019  # Respiratory arrest 12/6   # Elevated BNP, improving   - Stable paced rhythm with underlying atrial flutter. On review of cardiology, underlying aflutter/afib has been present since at least June of this year. Device RN adjusted PPM to PTA settings rate 60 at [facility].   - Pre-op 9/8/23 echo: LVEF 55/60%, mildly dilated RV, normal function  - Echo 12/4 with LVEF 60-65%, normal RV function, TV mean gradient 5 mmHg, no TR  - Echo 12/7 with no significant change from previous with the exception of mild reduction of RV. RV free wall dyskinesia.   - ASA 81 mg daily  -Hold scheduled midodrine 10 mg Q8H since BP now in the 140s systolic and stable on 12/16  -Continue midodrine 5 mg TID PRN for SBP<100   - Held PTA spironolactone, torsemide, metoprolol due to hypotension at prior hospital  - FLUID STATUS: Pre-op weight 70.9 kg, discharge weight 74.1 kg (163 lbs) on 12/15/2023 and 165 lbs on 12/16  -increased torsemide to 40 mg po daily on 12/19  -proBNP trend appears improved from a peak of 19187 to now 3710.  - Started Warfarin 12/15.  Pharmacy consulted for Coumadin dosing (INR goal 2-3)   -Continue lovenox (DVT prophylaxis dose) and Coumadin together for now until INR therapeutic     # Multiple loose stools   # Hx of C. Difficile (9/2023)  # Severe malnutrition in the context of acute illness  - Nutrition consulted for continuing tube feedings- will change to higher fiber tube feeds - on 12/18  - NJT and enteral feeds at goal (45 mL/hr)   - SLP: NPO  - PPI famotidine  - Bowel regimen discontinued due to continued loose stools  - Finished vancomycin po 7 day course   - Continue Banatrol  - PRN Zofran  4 mg PO or IV Q6H  - PRN Compazine 5 mg IV Q6H   - PRN Imodium QID      #Hypernatremia   #Hypokalemia,  resolved   #CKD stage 3  #Lactic acidosis, resolved  #IMAN on CKD, resolved   Baseline creat ~1.2-1.3   FLUID STATUS: Pre-op weight 70.9 kg, discharge weight 74.1 kg (163 lbs).   - Held PTA spironolactone 25 mg daily  - Held PTA torsemide   - Elevated sodium, Na 151 --> 147 on recheck, likely 2/2 aggressive diureses S/p bumex and chlorotiazide prior to transfer  -On free water flushes @45 mL Q1H, changed to 200 mL every 4 hourly on 12/16  - I/O has been inaccurate due to unmeasured stools and voids   - Replete lytes per protocol  - Avoid/limit nephrotoxins as able  - Strict I/O, daily weights, avoid/limit nephrotoxins     #Stress induced hyperglycemia  #T2DM  Hgb A1c 5.7%  - Medium Sliding scale insulin   - PTA Jardiance on hold, resume once medically optimized   - Goal BG <180 for optimal healing     # Stress induced leukocytosis  # Probable aspiration pneumonitis vs pneumonia, resolved  - 11/30 Zosyn 7-day course for pneumonia (finished)   - 12/6 Given further decompensation, Vanco and meropenem was started on 12/6   - Finished meropenem 7 day course (12/12)   - Finished Vancomycin PO 7 day course (for c-diff) (12/13)  - Blood culture with NGTD   - Urine with Candida and sputum with Yeast, likely both colonizations.   - Urine culture with NGTD     #Hx of recent C. Difficile  - C. Difficile toxin PCR positive 12/3 with negative antigen and negative toxin - per antimicrobial stewardship team, more likely to be colonization with recent history and no apparent treatment  - Finished oral vancomycin course on 12/13  - Monitor fever curve, WBC, and inflammatory markers as appropriate  - Enteric precautions per infection prevention protocol      # 3rd, 4th right rib fractures due to CPR  -Continue mini thoracotomy precautions  -Judicious use of narcotics, de-escalate as able    # Acute blood loss anemia  - Hgb has been stable.   -Hgb 7.6 on 12/16 and 8.0 on 12/17/2023  -Iron studies: Serum iron 29, iron binding capacity  "216, saturation index 13%.  -Follow CBC with weekly labs.   -Transfuse if Hgb<7    #Acute postoperative pain  - PRN: Tylenol, oxycodone    #Hx of general anxiety disorder  - PTA Lexapro and Remeron  - Scheduled Melatonin HS     # Perineal irritation    - WOC consulted        Diet: Adult Formula Drip Feeding: Continuous Jevity 1.5; Nasojejunal; Goal Rate: 50; mL/hr; TF to run x22 hours holding 1 hr before/after warfarin    DVT Prophylaxis: Heparin SQ and Warfarin  Barrientos Catheter: Not present  Lines: PRESENT      PICC 12/07/23 Triple Lumen Right Basilic ok to use PICC-Site Assessment: WDL      Cardiac Monitoring: None  Code Status: Full Code      Clinically Significant Risk Factors              # Hypoalbuminemia: Lowest albumin = 2.7 g/dL at 12/18/2023  5:17 AM, will monitor as appropriate     # Hypertension: Noted on problem list    # Chronic heart failure with preserved ejection fraction: heart failure noted on problem list and last echo with EF >50%       # Overweight: Estimated body mass index is 28.87 kg/m  as calculated from the following:    Height as of this encounter: 1.613 m (5' 3.5\").    Weight as of this encounter: 75.1 kg (165 lb 9.6 oz)., PRESENT ON ADMISSION  # Severe Malnutrition: based on nutrition assessment, PRESENT ON ADMISSION     # Financial/Environmental Concerns: none   # Pacemaker present  # History of CABG: noted on surgical history       Disposition Plan     Expected Discharge Date: 12/21/2023      Destination: home with family;foster/protective services              Clarissa Ndiaye MD  Hospitalist Service  LTACH  Securely message with BEKIZ (more info)  Text page via [employer] Paging/Directory   ______________________________________________________________________    Interval History   Still with increased lower extremity edema, asking for torsemide to be increased, still having anxiety, reports breathing has improved since yesterday.      No chest pain.       Physical Exam   Vital Signs: " Temp: 98.2  F (36.8  C) Temp src: Oral BP: (!) 142/68 Pulse: 66   Resp: 24 SpO2: 95 % O2 Device: (S) Mechanical Ventilator Oxygen Delivery: 30 LPM  Weight: 165 lbs 9.6 oz  GENERAL: Alert, oriented, conversant, in no distress.   EYES: Normal conjunctiva. Sclera anicteric. Nonystagmus. Extraocular movements intact.   NECK: Supple, no lymph adenopathy. JVP is not distended.  Tracheostomy in place  NOSE: NJ tube in place  LUNGS: Clear to auscultation. No ronchi or crackles. Equal air entry bilaterally.   HEART: S1 S2, Rate and rhythm is regular. No murmurs  ABDOMEN: Soft, nontender, no distension. Bowel sounds are positive. No guarding or rebound.   EXTREMITIES: 2+ pitting edema  SKIN: No rash or ulcers.   NEUROLOGIC: Alert and oriented x3.  Mouthing words, communicates by writing appropriately.  Clear mentation. Motor, sensory and cranial exam is grossly intact andsymmetric.   PSYCHIATRIC: Normal affect and cognition       Medical Decision Making       55 MINUTES SPENT BY ME on the date of service doing chart review, history, exam, documentation & further activities per the note.      Data     I have personally reviewed the following data over the past 24 hrs:    5.8  \   7.4 (L)   / 244     142 103 27.3 (H) /  125 (H)   4.7 34 (H) 0.76 \     ALT: 21 AST: 29 AP: 131 TBILI: 0.5   ALB: 2.8 (L) TOT PROTEIN: 5.3 (L) LIPASE: N/A     INR:  1.30 (H) PTT:  N/A   D-dimer:  N/A Fibrinogen:  N/A       Imaging results reviewed over the past 24 hrs:   Recent Results (from the past 24 hour(s))   XR Chest Port 1 View    Narrative    EXAM: XR CHEST PORT 1 VIEW  LOCATION: [facility]  DATE: 12/19/2023    INDICATION: hypoxic resp failure, trach, intermittent dyspnea  COMPARISON: 12/14/2023 older studies.      Impression    IMPRESSION: Poststernotomy changes with a prosthetic TVR and tracheostomy tube tube in place. Feeding tube can be seen coursing into the stomach. Right upper extremity PICC line catheter  overlies the proximal right atrium.    Elevation of the right hemidiaphragm, unchanged with small right effusion and right basilar and likely middle lobe opacities, presumed atelectasis. Left lung is clear. Heart is slightly enlarged. No signs of failure.     Recent Labs   Lab 12/19/23  0614 12/19/23  0540 12/19/23  0010 12/18/23  0528 12/18/23  0517 12/17/23  1202 12/17/23  0600   WBC 5.8  --   --   --  5.6  --  8.0   HGB 7.4*  --   --   --  7.4*  --  8.0*   MCV 98  --   --   --  98  --  97     --   --   --  253  --  271   INR 1.30*  --   --   --  1.12  --  0.98     --   --   --  144  --  141   POTASSIUM 4.7  --   --   --  4.1  --  4.3   CHLORIDE 103  --   --   --  105  --  103   CO2 34*  --   --   --  34*  --  33*   BUN 27.3*  --   --   --  28.4*  --  32.5*   CR 0.76  --   --   --  0.74  --  0.82   ANIONGAP 5*  --   --   --  5*  --  5*   BESS 8.8  --   --   --  8.7*  --  9.1   * 120* 106*   < > 116*   < > 119*   ALBUMIN 2.8*  --   --   --  2.7*  --  2.9*   PROTTOTAL 5.3*  --   --   --  5.2*  --  5.4*   BILITOTAL 0.5  --   --   --  0.4  --  0.5   ALKPHOS 131  --   --   --  122  --  128   ALT 21  --   --   --  20  --  24   AST 29  --   --   --  22  --  25    < > = values in this interval not displayed.       Most Recent 3 CBC's:  Recent Labs   Lab Test 12/19/23  0614 12/18/23  0517 12/17/23  0600   WBC 5.8 5.6 8.0   HGB 7.4* 7.4* 8.0*   MCV 98 98 97    253 271     Most Recent 3 BMP's:  Recent Labs   Lab Test 12/19/23  0614 12/19/23  0540 12/19/23  0010 12/18/23  0528 12/18/23  0517 12/17/23  1202 12/17/23  0600     --   --   --  144  --  141   POTASSIUM 4.7  --   --   --  4.1  --  4.3   CHLORIDE 103  --   --   --  105  --  103   CO2 34*  --   --   --  34*  --  33*   BUN 27.3*  --   --   --  28.4*  --  32.5*   CR 0.76  --   --   --  0.74  --  0.82   ANIONGAP 5*  --   --   --  5*  --  5*   BESS 8.8  --   --   --  8.7*  --  9.1   * 120* 106*   < > 116*   < > 119*    < > = values in  this interval not displayed.     Most Recent 2 LFT's:  Recent Labs   Lab Test 12/19/23  0614 12/18/23  0517   AST 29 22   ALT 21 20   ALKPHOS 131 122   BILITOTAL 0.5 0.4     Most Recent 3 INR's:  Recent Labs   Lab Test 12/19/23  0614 12/18/23  0517 12/17/23  0600   INR 1.30* 1.12 0.98     Most Recent 3 BNP's:  Recent Labs   Lab Test 12/17/23  0600 12/09/23  0338 12/07/23  0128 12/06/23  1720 08/30/23  1406 06/23/21  0926   NTBNPI 3,710* 22,193* 30,691*  --   --   --    NTBNP  --   --   --  33,124* 3,249* 3,163*     Most Recent 6 glucoses:  Recent Labs   Lab Test 12/19/23  0614 12/19/23  0540 12/19/23  0010 12/18/23  1824 12/18/23  1154 12/18/23  0528   * 120* 106* 103* 118* 105*     Most Recent ABG:  Recent Labs   Lab Test 12/14/23  0745   PH 7.45   PO2 95   PCO2 44   HCO3 31*   FABIANA 6.1*     Most Recent ESR & CRP:  Recent Labs   Lab Test 12/06/23  1720   CRPI 73.40*

## 2023-12-19 NOTE — TELEPHONE ENCOUNTER
L-tach progress note reviewed. Stable, with vent weaning, BP stable, low dose diuretics resumed after discontinuing Midodrine with  sys. Incisions intact. Heparin continued until INR therapeutic. Hydroxyzine initiated fir anxiety. Good HR on Metoprolol. Loose stools continue, fiber increased. No acute issues.

## 2023-12-19 NOTE — PLAN OF CARE
Problem: Enteral Nutrition  Goal: Feeding Tolerance  Outcome: Progressing     Problem: Adult Inpatient Plan of Care  Goal: Absence of Hospital-Acquired Illness or Injury  Intervention: Identify and Manage Fall Risk  Recent Flowsheet Documentation  Taken 12/19/2023 0100 by Rosa M Abbott RN  Safety Promotion/Fall Prevention:   increased rounding and observation   increase visualization of patient   lighting adjusted   room near nurse's station  Intervention: Prevent Skin Injury  Recent Flowsheet Documentation  Taken 12/19/2023 0100 by Rosa M Abbott RN  Device Skin Pressure Protection: absorbent pad utilized/changed  Intervention: Prevent Infection  Recent Flowsheet Documentation  Taken 12/19/2023 0100 by Rosa M Abbott RN  Infection Prevention:   hand hygiene promoted   personal protective equipment utilized   rest/sleep promoted  Goal: Optimal Comfort and Wellbeing  Intervention: Provide Person-Centered Care  Recent Flowsheet Documentation  Taken 12/19/2023 0100 by Rosa M Abbott RN  Trust Relationship/Rapport:   care explained   choices provided     Problem: Artificial Airway  Goal: Effective Communication  Intervention: Ensure Effective Communication  Recent Flowsheet Documentation  Taken 12/19/2023 0100 by Rosa M Abbott RN  Communication Enhancement Strategies: call light answered in person  Family/Support System Care: support provided  Trust Relationship/Rapport:   care explained   choices provided  Goal: Optimal Device Function  Intervention: Optimize Device Care and Function  Recent Flowsheet Documentation  Taken 12/19/2023 0100 by Rosa M Abbott RN  Airway Safety Measures: all equipment/monitors on and audible  Aspiration Precautions: NPO pending swallow screening/evaluation  Goal: Absence of Device-Related Skin or Tissue Injury  Intervention: Maintain Skin and Tissue Health  Recent Flowsheet Documentation  Taken 12/19/2023 0100 by Rosa M Abbott RN  Device Skin Pressure Protection: absorbent pad  utilized/changed     Problem: Pain Acute  Goal: Optimal Pain Control and Function  Intervention: Prevent or Manage Pain  Recent Flowsheet Documentation  Taken 12/19/2023 0100 by Rosa M Abbott RN  Sensory Stimulation Regulation: care clustered  Medication Review/Management: medications reviewed  Intervention: Optimize Psychosocial Wellbeing  Recent Flowsheet Documentation  Taken 12/19/2023 0100 by Rosa M Abbott RN  Supportive Measures: active listening utilized     Problem: Mechanical Ventilation Invasive  Goal: Effective Communication  Intervention: Ensure Effective Communication  Recent Flowsheet Documentation  Taken 12/19/2023 0100 by Rosa M Abbott RN  Communication Enhancement Strategies: call light answered in person  Family/Support System Care: support provided  Trust Relationship/Rapport:   care explained   choices provided  Goal: Optimal Device Function  Intervention: Optimize Device Care and Function  Recent Flowsheet Documentation  Taken 12/19/2023 0100 by Rosa M Abbott RN  Airway Safety Measures: all equipment/monitors on and audible  Goal: Mechanical Ventilation Liberation  Intervention: Promote Extubation and Mechanical Ventilation Liberation  Recent Flowsheet Documentation  Taken 12/19/2023 0100 by Rosa M Abbott RN  Medication Review/Management: medications reviewed  Environmental Support: calm environment promoted  Goal: Absence of Device-Related Skin and Tissue Injury  Intervention: Maintain Skin and Tissue Health  Recent Flowsheet Documentation  Taken 12/19/2023 0100 by Rosa M Abbott RN  Device Skin Pressure Protection: absorbent pad utilized/changed     Problem: Anxiety Signs/Symptoms  Goal: Improved Mood Symptoms (Anxiety Signs/Symptoms)  Intervention: Optimize Emotion and Mood  Recent Flowsheet Documentation  Taken 12/19/2023 0100 by Rosa M Abbott RN  Supportive Measures: active listening utilized  Goal: Enhanced Social, Occupational or Functional Skills (Anxiety Signs/Symptoms)  Intervention:  Promote Social, Occupational and Functional Ability  Recent Flowsheet Documentation  Taken 12/19/2023 0100 by Rosa M Abbott, RN  Trust Relationship/Rapport:   care explained   choices provided   Goal Outcome Evaluation:       Pt had prn Atarax 25 mg for anxiety and also Simethicone susp 40 mg for bloating at   0026.  Pt was able to sleep well after this, seems to tolerate tube feeding well, will continue to monitor.

## 2023-12-19 NOTE — PROGRESS NOTES
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 Subsequently had respiratory arrest on 12/6 with short CPR prior to ROSC. Treated for presumed pneumonia with multiple courses of abx, now off.  S/p trach on 12/13. She has been progressing with trach dome and intermittently on PS. Fluid overloaded treated with diuretics.   Assessment/Plan:     Problems:  Acute hypercapnic and hypoxic respiratory failure s/p trach: CXR 12/18 with RLL/RML atelectasis and small R pleural effusion, L lung clear.   Tracheostomy in place: 6 Shiley placed 12/13. Surgery more complex d/t scarred tissue from previous trach in 2019  Dysphagia s/p NJ: passed BDT  Aspiration pneumonitis vs pneumonia - off antibiotics   Respiratory/cardiac arrest on 12/6 possibly d/t mucus plugging.  Multiple rib fx d/t CPR  Hx NEDA  Hx of severe TR s/p TVR, HFpEF  Hx of Afib s/p AVN ablation - leadless PPM 7/2023: on coumadin    Recommendations:  Phase 2 weans: TM/PMV day.  AC night   Nebulized albuterol & mucomyst QID. Start metaneb to promote alveolar recruitment   Torsemide 40 daily(increased 12/19)  VAP ppx: chlorhexidine BID, HOB 30 degrees  GI ppx: famotidine   DVT ppx: heparin subcutaneous and coumadin   Routine trach care per protocol  Earliest first trach change next week   PT/OT/SLP     Subjective:   - didn't tolerate TM wean this morning mainly d/t anxiety.  After we placed her PMV on, tolerating TM/PMV quite well.  Denies sob, pain, n/v.  +diarrhea, primary making adjustments to TF and meds.  - sipping water and suctioning out with Yankauer for comfort.  I recommended not doing this given the risk for aspiration +/- pneumonia.  Patient unwilling to stop saying she'd rather die and promises not to  "drink the water.  at bedside and also expressed understanding of the inherent risk.     Tracheal secretions:  Overnight - 2x, small, thick   Yesterday - 3x, small/mod, thick     Cough strength: strong    Ventilator weaning results  12/18: TM for 3.5hr.  Break on vent.  Then TM again for 7hr.  PMV for 30 min     Clinical status discussed today with respiratory therapist     ROS: 10-system review obtained and negative with the exception of the symptoms noted above.    Medications:      dextrose      - MEDICATION INSTRUCTIONS -        acetylcysteine  2 mL Nebulization 4x Daily    albuterol  2.5 mg Nebulization 4x Daily    aspirin  81 mg Oral or Feeding Tube Daily    chlorhexidine  15 mL Mouth/Throat Q12H    enoxaparin ANTICOAGULANT  40 mg Subcutaneous Q24H    escitalopram  10 mg Oral or Feeding Tube Daily    famotidine  20 mg Oral or Feeding Tube Daily    insulin aspart  1-6 Units Subcutaneous Q6H    [START ON 12/20/2023] loperamide  2 mg Per Feeding Tube BID 09 12    Melatonin  5 mg Per Feeding Tube QPM    miconazole   Topical BID    mirtazapine  15 mg Oral or Feeding Tube At Bedtime    multivitamins w/minerals  15 mL Per Feeding Tube Daily    protein modular  1 packet Per Feeding Tube Daily    saccharomyces boulardii  250 mg Oral or Feeding Tube BID    sodium chloride (PF)  10 mL Intracatheter Q8H    [START ON 12/20/2023] torsemide  40 mg Per Feeding Tube Daily    warfarin ANTICOAGULANT  4 mg Per Feeding Tube ONCE at 18:00    Warfarin Therapy Reminder  1 each Oral See Admin Instructions         Exam/Data:   Vitals  BP (!) 142/68 (BP Location: Left arm)   Pulse 66   Temp 98.2  F (36.8  C) (Oral)   Resp 24   Ht 1.613 m (5' 3.5\")   Wt 75.5 kg (166 lb 7.2 oz)   SpO2 97%   BMI 29.02 kg/m       I/O last 3 completed shifts:  In: 1125 [NG/GT:1125]  Out: -   Weight change: -0.584 kg (-1 lb 4.6 oz)    Vent Mode: Trach collar  FiO2 (%): 30 %  Resp Rate (Set): 18 breaths/min  Tidal Volume (Set, mL): 390 mL  PEEP " (cm H2O): 5 cmH2O  Resp: 24      EXAM:  Gen: NAD in chair on TM/PMV  HEENT: NT, trach midline/intact, no stridor, clear voice   CV: RRR, no m/g/r  Resp: diminished b/l; non-labored, no wheeze   Abd: soft, nontender, BS+  Skin: no rashes or lesions  Ext: 2+ b/l lower extremity edema  Neuro: alert, follows commands     Labs:  Arterial Blood Gases   Recent Labs   Lab 12/14/23  0745 12/13/23  1721 12/13/23  0405   PH 7.45 7.44 7.44   PCO2 44 46* 48*   PO2 95 97 112*   HCO3 31* 31* 32*     Complete Blood Count   Recent Labs   Lab 12/19/23  0614 12/18/23  0517 12/17/23  0600 12/16/23  0550   WBC 5.8 5.6 8.0 8.6   HGB 7.4* 7.4* 8.0* 7.6*    253 271 274     Basic Metabolic Panel  Recent Labs   Lab 12/19/23  1136 12/19/23  0614 12/19/23  0540 12/19/23  0010 12/18/23  0528 12/18/23  0517 12/17/23  1202 12/17/23  0600 12/16/23  0629 12/16/23  0550   NA  --  142  --   --   --  144  --  141  --  144   POTASSIUM  --  4.7  --   --   --  4.1  --  4.3  --  4.0   CHLORIDE  --  103  --   --   --  105  --  103  --  106   CO2  --  34*  --   --   --  34*  --  33*  --  34*   BUN  --  27.3*  --   --   --  28.4*  --  32.5*  --  36.8*   CR  --  0.76  --   --   --  0.74  --  0.82  --  0.79   * 125* 120* 106*   < > 116*   < > 119*   < > 118*    < > = values in this interval not displayed.     Liver Function Tests  Recent Labs   Lab 12/19/23  0614 12/18/23  0517 12/17/23  0600 12/16/23  0550   AST 29 22 25 22   ALT 21 20 24 24   ALKPHOS 131 122 128 104   BILITOTAL 0.5 0.4 0.5 0.5   ALBUMIN 2.8* 2.7* 2.9* 2.7*   INR 1.30* 1.12 0.98 1.12     Coagulation Profile  Recent Labs   Lab 12/19/23  0614 12/18/23  0517 12/17/23  0600 12/16/23  0550   INR 1.30* 1.12 0.98 1.12       Radiology: Personally reviewed; radiology read below    XR CHEST 12/19/2023  Poststernotomy changes with a prosthetic TVR and tracheostomy tube tube in place. Feeding tube can be seen coursing into the stomach. Right upper extremity PICC line catheter overlies the  proximal right atrium. Elevation of the right hemidiaphragm, unchanged with small right effusion and right basilar and likely middle lobe opacities, presumed atelectasis. Left lung is clear. Heart is slightly enlarged. No signs of failure.    Matt Kennedy CNP  Pulmonary Medicine  [facility]  Pager [phone]

## 2023-12-19 NOTE — PLAN OF CARE
"  Problem: Artificial Airway  Goal: Effective Communication  Outcome: Progressing  Goal: Optimal Device Function  Outcome: Progressing  Goal: Absence of Device-Related Skin or Tissue Injury  Outcome: Progressing     Problem: Mechanical Ventilation Invasive  Goal: Effective Communication  Outcome: Progressing  Goal: Optimal Device Function  Outcome: Progressing  Goal: Mechanical Ventilation Liberation  Outcome: Progressing  Goal: Optimal Nutrition Delivery  Outcome: Progressing  Goal: Absence of Device-Related Skin and Tissue Injury  Outcome: Progressing  Goal: Absence of Ventilator-Induced Lung Injury  Outcome: Progressing  RT PROGRESS NOTE     DATA:     CURRENT SETTINGS: AC/18/390/+5             TRACH TYPE / SIZE:  #6 Shisarbjit TG 12/13/23             MODE:   AC             FIO2:   30%     ACTION:             THERAPIES:   ALB QID/MUCOMYST QID             SUCTION:                           FREQUENCY:  4                        AMOUNT:  small                          CONSISTENCY:   Thick                        COLOR:  pale yellow              SPONTANEOUS COUGH EFFORT/STRENGTH OF EFFORT (not elicited by suctioning): Yes:strong                              WEANING PHASE:   #2                        WEAN MODE:    30% and 30L/TM with cuff up days and full vent at night                        WEAN TIME:  first wean:  TM/PMV-30%/30L for 17 min. Second wean: started @ 1100 PM.                         END WEAN REASON:   Ongoing      RESPONSE:             BS:   coarse              VITAL SIGNS:   Blood pressure 139/64, pulse 61, temperature 98.4  F (36.9  C), temperature source Oral, resp. rate 22, height 1.613 m (5' 3.5\"), weight 75.5 kg (166 lb 7.2 oz), SpO2 95%, not currently breastfeeding. .              EMOTIONAL NEEDS / CONCERNS:  N/A                RISK FOR SELF DECANNULATION:  N/A                        RISK DUE TO:                          INTERVENTION/S IN PLACE IS/ARE:  N/A       NOTE / PLAN: Patient has been weaning on " 30%/30L-TM/PMV -since 1100 AM and is tolerating well. Patient weaned this morning on 30%/30L TM/PMV- for 17 min and switched to Vent- AC - due to increased WOB. Re-started the TM/PMV weaning at 1100 AM and is tolerating well at this time.   New order: Metaneb BID. Plan: will place on full-vent support and resume the weaning in AM.

## 2023-12-20 NOTE — PLAN OF CARE
Problem: Artificial Airway  Goal: Effective Communication  Outcome: Progressing  Goal: Optimal Device Function  Outcome: Progressing  Intervention: Optimize Device Care and Function  Recent Flowsheet Documentation  Taken 12/20/2023 1527 by Radha Goodrich RT  Airway Safety Measures: all equipment/monitors on and audible  Taken 12/20/2023 1300 by Radha Goodrich RT  Airway Safety Measures: all equipment/monitors on and audible  Taken 12/20/2023 1114 by Radha Goodrich RT  Airway Safety Measures: all equipment/monitors on and audible  Taken 12/20/2023 0809 by Radha Goodrich RT  Airway Safety Measures: all equipment/monitors on and audible  Taken 12/20/2023 0753 by Radha Goodrich RT  Airway Safety Measures: all equipment/monitors on and audible  Goal: Absence of Device-Related Skin or Tissue Injury  Outcome: Progressing     Problem: Mechanical Ventilation Invasive  Goal: Effective Communication  Outcome: Progressing  Goal: Optimal Device Function  Outcome: Progressing  Intervention: Optimize Device Care and Function  Recent Flowsheet Documentation  Taken 12/20/2023 1527 by Radha Goodrich RT  Airway Safety Measures: all equipment/monitors on and audible  Taken 12/20/2023 1300 by Radha Goodrich RT  Airway Safety Measures: all equipment/monitors on and audible  Taken 12/20/2023 1114 by Radha Goodrich RT  Airway Safety Measures: all equipment/monitors on and audible  Taken 12/20/2023 0809 by Radha Goodrich RT  Airway Safety Measures: all equipment/monitors on and audible  Taken 12/20/2023 0753 by Radha Goodrich RT  Airway Safety Measures: all equipment/monitors on and audible  Goal: Mechanical Ventilation Liberation  Outcome: Progressing  Goal: Absence of Device-Related Skin and Tissue Injury  Outcome: Progressing  Goal: Absence of Ventilator-Induced Lung Injury  Outcome: Progressing

## 2023-12-20 NOTE — PROGRESS NOTES
"   12/20/23 1600   Name of Certified Therapeutic Rec Specialist   Name of Certified Therapeutic Rec Specialist GASTON Perdue   Appointment Type   Type of Therapeutic Rec Session Therapeutic Rec Assessment   General Information   Patient Profile Review See Profile for full history and prior level of function   Daily Contact with Relatives or Friends Phone call;Visit   Community Involvement   Community Involvement Retired   Spiritual Practice [facility] ? Yes   Outings Dinner;Shopping   Hobbies/Interests   Cards 500   Music   Listens to Recorded Music No   Media   Computer Has own at home;Will use tablet/phone   TV / Movies TV   Reading Books   Reading Preferences Fiction;Other (see comments)  (Romance)   Sports / Physical Activities   Outdoor Activities Outdoor gardening   Sports/Exercise Walks   Sports Fan Baseball;Basketball;Football;Golf;Hockey   Impression   Open to Socializing with Others Independent   Barriers to Leisure Endurance;Mobility   Patient, family and / or staff in agreement with Plan of Care Yes   Treatment Plan   Type of Intervention Independent with activity   Equipment and Supplies While on Unit None needed   Assessment Assessment completed, pt was oriented to role of rec therapy and provided with leisure resource list. pt has tablet and phone for leisure, no other needs at this time. Pt stated, \"Let me get a little more bored first\". Will monitor and provide materials as needed       "

## 2023-12-20 NOTE — PLAN OF CARE
Problem: Artificial Airway  Goal: Effective Communication  Outcome: Progressing  Goal: Optimal Device Function  Outcome: Progressing  Intervention: Optimize Device Care and Function  Recent Flowsheet Documentation  Taken 12/20/2023 0033 by Haim Mix RT  Airway Safety Measures: all equipment/monitors on and audible  Taken 12/19/2023 1950 by Haim Mix RT  Airway Safety Measures: all equipment/monitors on and audible  Goal: Absence of Device-Related Skin or Tissue Injury  Outcome: Progressing     Problem: Mechanical Ventilation Invasive  Goal: Effective Communication  Outcome: Progressing  Goal: Optimal Device Function  Outcome: Progressing  Intervention: Optimize Device Care and Function  Recent Flowsheet Documentation  Taken 12/20/2023 0033 by Haim Mix RT  Airway Safety Measures: all equipment/monitors on and audible  Taken 12/19/2023 1950 by Haim Mix RT  Airway Safety Measures: all equipment/monitors on and audible  Goal: Mechanical Ventilation Liberation  Outcome: Progressing    RT PROGRESS NOTE     DATA:     CURRENT SETTINGS: 18, 390, +5             TRACH TYPE / SIZE: #6 Shiley, placed on 12/13             MODE: AC             FIO2: 24%     ACTION:             THERAPIES: Albuterol and mucomyst QID. Metaneb BID             SUCTION:                           FREQUENCY: X3                        AMOUNT: small                        CONSISTENCY: thick                        COLOR: pale yellow             SPONTANEOUS COUGH EFFORT/STRENGTH OF EFFORT (not elicited by suctioning): good productive              WEANING PHASE: 2                        WEAN MODE: 30%/30L TM/PMV                        WEAN TIME: 13:30                        END WEAN REASON: AC for night      RESPONSE:             BS: Clear             VITAL SIGNS: /58 (BP Location: Left arm, Patient Position: Semi-Hitchcock's, Cuff Size: Adult Regular)   Pulse 62   Temp 97.6  F (36.4  C) (Oral)   Resp 22   Ht 1.613 m (5'  "3.5\")   Wt 75.4 kg (166 lb 3.6 oz)   SpO2 100%   BMI 28.98 kg/m                  EMOTIONAL NEEDS / CONCERNS:                  RISK FOR SELF DECANNULATION:                          RISK DUE TO:                          INTERVENTION/S IN PLACE IS/ARE:         NOTE / PLAN:  TM/PMV days and AC at night.                                               "

## 2023-12-20 NOTE — PLAN OF CARE
Problem: Adult Inpatient Plan of Care  Goal: Optimal Comfort and Wellbeing  Outcome: Progressing  Intervention: Provide Person-Centered Care  Recent Flowsheet Documentation  Taken 12/20/2023 0845 by Darcy Bean RN  Trust Relationship/Rapport: care explained  Taken 12/20/2023 0830 by Darcy Bean RN  Trust Relationship/Rapport: care explained     Problem: Anxiety Signs/Symptoms  Goal: Optimized Energy Level (Anxiety Signs/Symptoms)  Outcome: Progressing     Problem: Diarrhea  Goal: Effective Diarrhea Management  Outcome: Progressing  Intervention: Manage Diarrhea  Recent Flowsheet Documentation  Taken 12/20/2023 0845 by Darcy Bean RN  Fluid/Electrolyte Management: fluids provided  Perineal Care:   perineal hygiene encouraged   perineum cleansed   Goal Outcome Evaluation:       Patient was anxious in the morning, was incontinent of bowel and bladder, had dyspnea with exertion, such as getting out of bed to commode. Assisted with pivot transfer out of bed, complained of itching, good andriy cares done to remove stools from vaginal fistula, given atarax for itching, and simethicone for abdominal bloating.

## 2023-12-20 NOTE — PROGRESS NOTES
RT PROGRESS NOTE     DATA:     CURRENT SETTINGS:             TRACH TYPE / SIZE:  #6 Flip (placed 12/13)             MODE:   TM (cuff down)             FIO2:   30%/30L     ACTION:             THERAPIES:   Albuterol TID, Mucomyst/Metaneb BID               SUCTION:                           FREQUENCY:   x3                        AMOUNT:   Scant to Small                        CONSISTENCY:   Thick                        COLOR:   Pale Yellow             SPONTANEOUS COUGH EFFORT/STRENGTH OF EFFORT (not elicited by suctioning): Moderate Spontaneous Cough                           WEANING PHASE:   Phase 2                        WEAN MODE:    30%/30L TM (cuff down)                        WEAN TIME:   8:09am (PMV 7 hours 18 minutes and 31 minutes)                        END WEAN REASON:   Still Weaning (PMV removed due to slight increase in WOB/Stridor)     RESPONSE:             BS:   Coarse/Diminished             VITAL SIGNS:   Sating %, HR 60-72, RR 24-28             EMOTIONAL NEEDS / CONCERNS:  NA                RISK FOR SELF DECANNULATION:  No     NOTE / PLAN:   TM/PMV as tolerated, if increased WOB or stridor may need to remove the PMV.  VBG in the am 12/21 in stays on TM.  Full vent settings if needed are AC 18, 390, +5, 30%.  Pt has slight stridor and back pressure with PMV (breathing comfortable).  RT will continue to monitor.

## 2023-12-20 NOTE — PROGRESS NOTES
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 Subsequently had respiratory arrest on 12/6 with short CPR prior to ROSC. Treated for presumed pneumonia with multiple courses of abx, now off.  S/p trach on 12/13. She has been progressing with trach dome and intermittently on PS. Fluid overloaded treated with diuretics.   Assessment/Plan:     Problems:  Acute hypercapnic and hypoxic respiratory failure s/p trach: CXR 12/18 with RLL/RML atelectasis and small R pleural effusion, L lung clear.   Tracheostomy in place: 6 Shiley placed 12/13. Surgery more complex d/t scarred tissue from previous trach in 2019  Dysphagia s/p NJ: passed BDT  Aspiration pneumonitis vs pneumonia - off antibiotics   Respiratory/cardiac arrest on 12/6 possibly d/t mucus plugging.  Multiple rib fx d/t CPR  Hx NEDA  Hx of severe TR s/p TVR, HFpEF  Hx of Afib s/p AVN ablation - leadless PPM 7/2023: on coumadin    Recommendations:  Phase 2 weans: TM/PMV as tolerated.  VBG in am  May need the PMV removed at times as she does have some intermittent stridor suspect d/t trach size   Earliest first trach change 12/23, so likely will plan for early next week  VFSS tomorrow   Nebulized albuterol TID, mucomyst & metaneb BID   Torsemide 40 daily(increased 12/19)  VAP ppx: chlorhexidine BID, HOB 30 degrees  GI ppx: famotidine   DVT ppx: lovenox subcutaneous and coumadin   Routine trach care per protocol   PT/OT/SLP     Subjective:   - denies sob, pain, n/v     Tracheal secretions:  Overnight - 3x, small, thick   Yesterday - 4x, small, thick     Cough strength: strong    Ventilator weaning results  12/18: TM for 3.5hr.  Break on vent.  Then TM again for 7hr.  PMV for 30 min   12/19: TM/PMV for 13.5hr  "    Clinical status discussed today with respiratory therapist     ROS: 10-system review obtained and negative with the exception of the symptoms noted above.    Medications:      dextrose      - MEDICATION INSTRUCTIONS -        acetylcysteine  2 mL Nebulization BID    albuterol  2.5 mg Nebulization 3 times daily    aspirin  81 mg Oral or Feeding Tube Daily    chlorhexidine  15 mL Mouth/Throat Q12H    enoxaparin ANTICOAGULANT  40 mg Subcutaneous Q24H    escitalopram  10 mg Oral or Feeding Tube Daily    famotidine  20 mg Oral or Feeding Tube Daily    insulin aspart  1-6 Units Subcutaneous Q6H    loperamide  2 mg Per Feeding Tube BID 09 12    Melatonin  5 mg Per Feeding Tube QPM    miconazole   Topical BID    mirtazapine  15 mg Oral or Feeding Tube At Bedtime    multivitamins w/minerals  15 mL Per Feeding Tube Daily    protein modular  1 packet Per Feeding Tube Daily    saccharomyces boulardii  250 mg Oral or Feeding Tube BID    sodium chloride (PF)  10 mL Intracatheter Q8H    torsemide  40 mg Per Feeding Tube Daily    Warfarin Therapy Reminder  1 each Oral See Admin Instructions         Exam/Data:   Vitals  BP (!) 164/71 (BP Location: Left arm, Patient Position: Sitting, Cuff Size: Adult Regular)   Pulse 82   Temp 98.3  F (36.8  C) (Oral)   Resp 26   Ht 1.613 m (5' 3.5\")   Wt 75.6 kg (166 lb 10.7 oz)   SpO2 95%   BMI 29.06 kg/m       I/O last 3 completed shifts:  In: 1649 [I.V.:60; NG/GT:1589]  Out: -   Weight change: 0.384 kg (13.6 oz)    Vent Mode: CMV/AC  (Continuous Mandatory Ventilation/ Assist Control)  FiO2 (%): 30 %  Resp Rate (Set): 18 breaths/min  Tidal Volume (Set, mL): 390 mL  PEEP (cm H2O): 5 cmH2O  Resp: 26      EXAM:  Gen: NAD in chair on TM/PMV  HEENT: NT, trach midline/intact, exp stridor but tolerating PMV  CV: RRR, no m/g/r  Resp: diminished b/l; non-labored, no wheeze   Abd: soft, nontender, BS+  Skin: no rashes or lesions  Ext: 2+ b/l lower extremity edema  Neuro: alert, follows commands "     Labs:  Arterial Blood Gases   Recent Labs   Lab 12/14/23  0745 12/13/23  1721   PH 7.45 7.44   PCO2 44 46*   PO2 95 97   HCO3 31* 31*     Complete Blood Count   Recent Labs   Lab 12/20/23  0613 12/19/23  0614 12/18/23  0517 12/17/23  0600   WBC 5.7 5.8 5.6 8.0   HGB 7.8* 7.4* 7.4* 8.0*    244 253 271     Basic Metabolic Panel  Recent Labs   Lab 12/20/23  0620 12/20/23  0613 12/19/23  2332 12/19/23  1817 12/19/23  1136 12/19/23  0614 12/18/23  0528 12/18/23  0517 12/17/23  1202 12/17/23  0600   NA  --  141  --   --   --  142  --  144  --  141   POTASSIUM  --  4.1  --   --   --  4.7  --  4.1  --  4.3   CHLORIDE  --  101  --   --   --  103  --  105  --  103   CO2  --  33*  --   --   --  34*  --  34*  --  33*   BUN  --  30.9*  --   --   --  27.3*  --  28.4*  --  32.5*   CR  --  0.84  --   --   --  0.76  --  0.74  --  0.82   GLC 99 114* 118* 99   < > 125*   < > 116*   < > 119*    < > = values in this interval not displayed.     Liver Function Tests  Recent Labs   Lab 12/20/23  0613 12/19/23 0614 12/18/23  0517 12/17/23  0600   AST 26 29 22 25   ALT 22 21 20 24   ALKPHOS 144 131 122 128   BILITOTAL 0.6 0.5 0.4 0.5   ALBUMIN 2.9* 2.8* 2.7* 2.9*   INR 1.36* 1.30* 1.12 0.98     Coagulation Profile  Recent Labs   Lab 12/20/23  0613 12/19/23  0614 12/18/23  0517 12/17/23  0600   INR 1.36* 1.30* 1.12 0.98       Radiology: Personally reviewed; radiology read below    XR CHEST 12/19/2023  Poststernotomy changes with a prosthetic TVR and tracheostomy tube tube in place. Feeding tube can be seen coursing into the stomach. Right upper extremity PICC line catheter overlies the proximal right atrium. Elevation of the right hemidiaphragm, unchanged with small right effusion and right basilar and likely middle lobe opacities, presumed atelectasis. Left lung is clear. Heart is slightly enlarged. No signs of failure.    Matt Kennedy, TATIANA  Pulmonary Medicine  [facility]  Pager [phone]

## 2023-12-20 NOTE — PLAN OF CARE
"  Problem: Enteral Nutrition  Goal: Absence of Aspiration Signs and Symptoms  Outcome: Progressing  Intervention: Minimize Aspiration Risk  Recent Flowsheet Documentation  Taken 12/20/2023 0400 by Nery Rodriguez RN  Enteral Feeding Safety: tubing labeled as enteral feeding  Oral Care: mouth wash rinse     Problem: Pain Acute  Goal: Optimal Pain Control and Function  Intervention: Develop Pain Management Plan  Recent Flowsheet Documentation  Taken 12/20/2023 0022 by Nery Rodriguez RN  Pain Management Interventions: (reposition to comfort) --  Intervention: Prevent or Manage Pain  Recent Flowsheet Documentation  Taken 12/20/2023 0400 by Nery Rodriguez RN  Medication Review/Management: medications reviewed  Intervention: Optimize Psychosocial Wellbeing  Recent Flowsheet Documentation  Taken 12/20/2023 0400 by Nery Rodriguez RN  Supportive Measures: active listening utilized  Patient's AOX4, AX1. Complained of pain at the back when lying on bed. Alleviated by repositioning. Tylenol PRN given. Hydroxyzine PRN given. Tolerated TF well. Bladder scan of 329 in AM. Patient doesn't have urge to pee. Will continue to monitor.  /58 (BP Location: Left arm, Patient Position: Semi-Hitchcock's, Cuff Size: Adult Regular)   Pulse 60   Temp 97.6  F (36.4  C) (Oral)   Resp 18   Ht 1.613 m (5' 3.5\")   Wt 75.4 kg (166 lb 3.6 oz)   SpO2 96%   BMI 28.98 kg/m                              "

## 2023-12-20 NOTE — PROGRESS NOTES
[facility]    Medicine Progress Note - Hospitalist Service    Date of Admission:  12/15/2023    Brief summary:  Priya Funez is a 83 year old female with a PMHx of HFpEF, severe TR, permanent AF s/p AVN ablation with PPM implant 1/11/19 s/p extraction TV (transvenous) PPM and implant leadless PPM 7/31/23, emergent aortic dissection repair 1/4/19, HTN, CKD, NEDA, and obesity who presented to [facility] as an outpatient for elective TVR done on 11/28/2023.  Immediate postoperative course was complicated by acute hypoxic and hypercapnic respiratory failure requiring BiPAP and probable pneumonia and treated with 7 days of IV Zosyn, patient was transferred to surgical telemetry floor on 12/2/2020.  On 12/6/2023 she was transferred back to CVICU after RRT for acute hypoxic respiratory distress and subsequent respiratory arrest with short CPR, re-intubation, and ROSC achieved. She sustained mildly displaced and nondisplaced right 3-5 rib fractures with associated right chest wall hematoma and nondisplaced anterolateral left 4th and 5th rib fractures. She was treated with meropenem 7 day course. She also has hx of recent C diff and multiple loose stools, for which she completed a 7 day po vancomycin course (per antimicrobial stewardship team, more likely to be colonization with recent history and no apparent treatment). Patient was transiently hyperglycemic and treated with insulin infusion then transitioned to sliding scale insulin per protocol. Blood sugars remained stable, has not required insulin coverage.  She remained intubated in the CVICU until undergoing tracheostomy by Dr. Pitts on 12/13/23, doing well with trach dome and PS currently.  She was treated for volume overload with diuretics (Bumex and chlorothiazide) with subsequent hypernatremia and hypotension requiring FWF and pressor support.  Spironolactone, torsemide and metoprolol discontinued and midodrine started with now stable blood pressures. Pre-op weight 70.9 kg,  discharge weight 74.1 kg (163 lbs) on 12/15/2023.  Patient was discharged to LTACH 12/15/2023 for continuing vent weaning, therapies     LTACH course:  12/17: Vitals stable.  Scheduled midodrine discontinued with BP stable in the 140s.  Diuretic therapy with torsemide restarted given peripheral edema and volume status, however at lower dose of 10 mg daily (home dose 40 mg daily) titrate slowly based on BP response.  Resume metoprolol for A-fib rate control as well gradually.  Labs reviewed, stable electrolytes, creatinine 0.82. ProBNP 3710, down from 46489 and 27190.  Hgb 8.0 from 7.6.  INR 0.98 from 1.12.  Continue heparin and Coumadin together for now until INR therapeutic.  Started hydroxyzine for anxiety 12/17.  A.m. labs ordered.      12/18:  increased torsemide to 20 mg po daily due to peripheral edema, having loose stools, will change tube feeds to higher fiber formula and add imodium daily and prn .    12/19:  increase toresemide to 40 mg po daily as edema is still severe, increase imodium due to loose stools, changed heparin to lovenox for pt comfort    12/20:  reports that she is urinating more, prn hydroxyzine given for anxiety    Assessment & Plan      #Acute respiratory failure and arrest on 12/6/23   #Acute hypercapnic and hypoxic respiratory failure requiring BiPAP  # s/p tracheostomy 12/13/23  # s/p Mechanical ventilation  #Aspiration pneumonitis vs pneumonia  #Possible aspiration event 12/1  #Near complete plugging of left lower lobe on CT   #Acute mildly displaced and nondisplaced right 3-5 rib fractures with associated right chest wall hematoma and nondisplaced anterolateral left fourth and fifth rib fractures  -Admit to LTACH 12/15/2023 to continue vent weaning, therapies  -Pulmonology consult for vent weaning process  -RT consult for tracheostomy cares, oxygenation, nebs, pulmonary therapies  - Tracheostomy sutures to be removed POD#5 (12/18/2023)  - Mucomyst, duoneb w/ RT Q4H   - Prudence comer as  much as tolerated   - Continue therapy with RT   - SLP speaking valve as tolerated      # S/p respiratory arrest 12/6  # Hx of severe TR s/p TVR  # HFpEF  # Hx of Afib s/p AVN ablation - PPM, upgraded to leadless 7/2023  # Hypertension  # Hyperlipidemia  # Hx of aortic dissection repair 2019  # Respiratory arrest 12/6   # Elevated BNP, improving   - Stable paced rhythm with underlying atrial flutter. On review of cardiology, underlying aflutter/afib has been present since at least June of this year. Device RN adjusted PPM to PTA settings rate 60 at [facility].   - Pre-op 9/8/23 echo: LVEF 55/60%, mildly dilated RV, normal function  - Echo 12/4 with LVEF 60-65%, normal RV function, TV mean gradient 5 mmHg, no TR  - Echo 12/7 with no significant change from previous with the exception of mild reduction of RV. RV free wall dyskinesia.   - ASA 81 mg daily  -Hold scheduled midodrine 10 mg Q8H since BP now in the 140s systolic and stable on 12/16  -Continue midodrine 5 mg TID PRN for SBP<100   - Held PTA spironolactone, torsemide, metoprolol due to hypotension at prior hospital  - FLUID STATUS: Pre-op weight 70.9 kg, discharge weight 74.1 kg (163 lbs) on 12/15/2023 and 165 lbs on 12/16  -increased torsemide to 40 mg po daily on 12/19  -proBNP trend appears improved from a peak of 18326 to now 3710.  - Started Warfarin 12/15.  Pharmacy consulted for Coumadin dosing (INR goal 2-3)   -Continue lovenox (DVT prophylaxis dose) and Coumadin together for now until INR therapeutic     # Multiple loose stools   # Hx of C. Difficile (9/2023)  # Severe malnutrition in the context of acute illness  - Nutrition consulted for continuing tube feedings- will change to higher fiber tube feeds - on 12/18  - NJT and enteral feeds at goal (45 mL/hr)   - SLP: NPO  - PPI famotidine  - Bowel regimen discontinued due to continued loose stools  - Finished vancomycin po 7 day course   - Continue Banatrol  - PRN Zofran  4 mg PO or IV Q6H  - PRN Compazine  5 mg IV Q6H   - PRN Imodium QID      #Hypernatremia   #Hypokalemia, resolved   #CKD stage 3  #Lactic acidosis, resolved  #IMAN on CKD, resolved   Baseline creat ~1.2-1.3   FLUID STATUS: Pre-op weight 70.9 kg, discharge weight 74.1 kg (163 lbs).   - Held PTA spironolactone 25 mg daily  - Held PTA torsemide   - Elevated sodium, Na 151 --> 147 on recheck, likely 2/2 aggressive diureses S/p bumex and chlorotiazide prior to transfer  -On free water flushes @45 mL Q1H, changed to 200 mL every 4 hourly on 12/16  - I/O has been inaccurate due to unmeasured stools and voids   - Replete lytes per protocol  - Avoid/limit nephrotoxins as able  - Strict I/O, daily weights, avoid/limit nephrotoxins     #Stress induced hyperglycemia  #T2DM  Hgb A1c 5.7%  - Medium Sliding scale insulin   - PTA Jardiance on hold, resume once medically optimized   - Goal BG <180 for optimal healing     # Stress induced leukocytosis  # Probable aspiration pneumonitis vs pneumonia, resolved  - 11/30 Zosyn 7-day course for pneumonia (finished)   - 12/6 Given further decompensation, Vanco and meropenem was started on 12/6   - Finished meropenem 7 day course (12/12)   - Finished Vancomycin PO 7 day course (for c-diff) (12/13)  - Blood culture with NGTD   - Urine with Candida and sputum with Yeast, likely both colonizations.   - Urine culture with NGTD     #Hx of recent C. Difficile  - C. Difficile toxin PCR positive 12/3 with negative antigen and negative toxin - per antimicrobial stewardship team, more likely to be colonization with recent history and no apparent treatment  - Finished oral vancomycin course on 12/13  - Monitor fever curve, WBC, and inflammatory markers as appropriate  - Enteric precautions per infection prevention protocol      # 3rd, 4th right rib fractures due to CPR  -Continue mini thoracotomy precautions  -Judicious use of narcotics, de-escalate as able    # Acute blood loss anemia  - Hgb has been stable.   -Hgb 7.6 on 12/16 and 8.0  "on 12/17/2023  -Iron studies: Serum iron 29, iron binding capacity 216, saturation index 13%.  -Follow CBC with weekly labs.   -Transfuse if Hgb<7    #Acute postoperative pain  - PRN: Tylenol, oxycodone    #Hx of general anxiety disorder  - PTA Lexapro and Remeron  - Scheduled Melatonin HS     # Perineal irritation    - WOC consulted        Diet: Adult Formula Drip Feeding: Continuous Jevity 1.5; Nasojejunal; Goal Rate: 50; mL/hr; TF to run x22 hours holding 1 hr before/after warfarin  NPO for Medical/Clinical Reasons Except for: Ice Chips, NPO but receiving Tube Feeding    DVT Prophylaxis: Heparin SQ and Warfarin  Barrientos Catheter: Not present  Lines: PRESENT      PICC 12/07/23 Triple Lumen Right Basilic ok to use PICC-Site Assessment: WDL      Cardiac Monitoring: None  Code Status: Full Code      Clinically Significant Risk Factors              # Hypoalbuminemia: Lowest albumin = 2.7 g/dL at 12/18/2023  5:17 AM, will monitor as appropriate     # Hypertension: Noted on problem list    # Chronic heart failure with preserved ejection fraction: heart failure noted on problem list and last echo with EF >50%       # Overweight: Estimated body mass index is 29.06 kg/m  as calculated from the following:    Height as of this encounter: 1.613 m (5' 3.5\").    Weight as of this encounter: 75.6 kg (166 lb 10.7 oz).   # Severe Malnutrition: based on nutrition assessment      # Financial/Environmental Concerns: none   # Pacemaker present  # History of CABG: noted on surgical history       Disposition Plan     Expected Discharge Date: 12/26/2023      Destination: home with family;foster/protective services              Clarissa Ndiaye MD  Hospitalist Service  LTACH  Securely message with [employer] (more info)  Text page via [email or web address] Paging/Directory   ______________________________________________________________________    Interval History   reports that she is urinating more, prn hydroxyzine given for anxiety    No chest pain. " +intermittent dyspnea, improved since yesterday       Physical Exam   Vital Signs: Temp: 98.3  F (36.8  C) Temp src: Oral BP: 127/60 Pulse: 72   Resp: 24 SpO2: 100 % O2 Device: Trach dome Oxygen Delivery: 30 LPM  Weight: 166 lbs 10.68 oz  GENERAL: Alert, oriented, conversant, in no distress.   EYES: Normal conjunctiva. Sclera anicteric. Nonystagmus. Extraocular movements intact.   NECK: Supple, no lymph adenopathy. JVP is not distended.  Tracheostomy in place  NOSE: NJ tube in place  LUNGS: Clear to auscultation. No ronchi or crackles. Equal air entry bilaterally.   HEART: S1 S2, Rate and rhythm is regular. No murmurs  ABDOMEN: Soft, nontender, no distension. Bowel sounds are positive. No guarding or rebound.   EXTREMITIES: 2+ pitting edema  SKIN: No rash or ulcers.   NEUROLOGIC: Alert and oriented x3.  Mouthing words, communicates by writing appropriately.  Clear mentation. Motor, sensory and cranial exam is grossly intact andsymmetric.   PSYCHIATRIC: Normal affect and cognition       Medical Decision Making       53 MINUTES SPENT BY ME on the date of service doing chart review, history, exam, documentation & further activities per the note.      Data     I have personally reviewed the following data over the past 24 hrs:    5.7  \   7.8 (L)   / 242     141 101 30.9 (H) /  99   4.1 33 (H) 0.84 \     ALT: 22 AST: 26 AP: 144 TBILI: 0.6   ALB: 2.9 (L) TOT PROTEIN: 5.7 (L) LIPASE: N/A     INR:  1.36 (H) PTT:  N/A   D-dimer:  N/A Fibrinogen:  N/A       Imaging results reviewed over the past 24 hrs:   No results found for this or any previous visit (from the past 24 hour(s)).    Recent Labs   Lab 12/20/23  0620 12/20/23  0613 12/19/23  2332 12/19/23  1136 12/19/23  0614 12/18/23  0528 12/18/23  0517   WBC  --  5.7  --   --  5.8  --  5.6   HGB  --  7.8*  --   --  7.4*  --  7.4*   MCV  --  97  --   --  98  --  98   PLT  --  242  --   --  244  --  253   INR  --  1.36*  --   --  1.30*  --  1.12   NA  --  141  --   --  142   --  144   POTASSIUM  --  4.1  --   --  4.7  --  4.1   CHLORIDE  --  101  --   --  103  --  105   CO2  --  33*  --   --  34*  --  34*   BUN  --  30.9*  --   --  27.3*  --  28.4*   CR  --  0.84  --   --  0.76  --  0.74   ANIONGAP  --  7  --   --  5*  --  5*   BESS  --  8.7*  --   --  8.8  --  8.7*   GLC 99 114* 118*   < > 125*   < > 116*   ALBUMIN  --  2.9*  --   --  2.8*  --  2.7*   PROTTOTAL  --  5.7*  --   --  5.3*  --  5.2*   BILITOTAL  --  0.6  --   --  0.5  --  0.4   ALKPHOS  --  144  --   --  131  --  122   ALT  --  22  --   --  21  --  20   AST  --  26  --   --  29  --  22    < > = values in this interval not displayed.       Most Recent 3 CBC's:  Recent Labs   Lab Test 12/20/23  0613 12/19/23  0614 12/18/23  0517   WBC 5.7 5.8 5.6   HGB 7.8* 7.4* 7.4*   MCV 97 98 98    244 253     Most Recent 3 BMP's:  Recent Labs   Lab Test 12/20/23  0620 12/20/23  0613 12/19/23  2332 12/19/23  1136 12/19/23  0614 12/18/23  0528 12/18/23  0517   NA  --  141  --   --  142  --  144   POTASSIUM  --  4.1  --   --  4.7  --  4.1   CHLORIDE  --  101  --   --  103  --  105   CO2  --  33*  --   --  34*  --  34*   BUN  --  30.9*  --   --  27.3*  --  28.4*   CR  --  0.84  --   --  0.76  --  0.74   ANIONGAP  --  7  --   --  5*  --  5*   BESS  --  8.7*  --   --  8.8  --  8.7*   GLC 99 114* 118*   < > 125*   < > 116*    < > = values in this interval not displayed.     Most Recent 2 LFT's:  Recent Labs   Lab Test 12/20/23  0613 12/19/23  0614   AST 26 29   ALT 22 21   ALKPHOS 144 131   BILITOTAL 0.6 0.5     Most Recent 3 INR's:  Recent Labs   Lab Test 12/20/23  0613 12/19/23  0614 12/18/23  0517   INR 1.36* 1.30* 1.12     Most Recent 3 BNP's:  Recent Labs   Lab Test 12/17/23  0600 12/09/23  0338 12/07/23  0128 12/06/23  1720 08/30/23  1406 06/23/21  0926   NTBNPI 3,710* 22,193* 30,691*  --   --   --    NTBNP  --   --   --  33,124* 3,249* 3,163*     Most Recent 6 glucoses:  Recent Labs   Lab Test 12/20/23  0620 12/20/23  0613  12/19/23  2332 12/19/23  1817 12/19/23  1136 12/19/23  0614   GLC 99 114* 118* 99 125* 125*     Most Recent ABG:  Recent Labs   Lab Test 12/14/23  0745   PH 7.45   PO2 95   PCO2 44   HCO3 31*   FABIANA 6.1*     Most Recent ESR & CRP:  Recent Labs   Lab Test 12/06/23  1720   CRPI 73.40*

## 2023-12-21 NOTE — PROGRESS NOTES
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 Subsequently had respiratory arrest on 12/6 with short CPR prior to ROSC. Treated for presumed pneumonia with multiple courses of abx, now off.  S/p trach on 12/13. She has been progressing with trach dome and intermittently on PS. Fluid overloaded treated with diuretics.   Assessment/Plan:     Problems:  Acute hypercapnic and hypoxic respiratory failure s/p trach: CXR 12/18 with RLL/RML atelectasis and small R pleural effusion, L lung clear.   Tracheostomy in place: 6 Shiley placed 12/13. Surgery more complex d/t scarred tissue from previous trach in 2019  Dysphagia s/p NJ: passed BDT  Aspiration pneumonitis vs pneumonia - off antibiotics   Respiratory/cardiac arrest on 12/6 possibly d/t mucus plugging.  Multiple rib fx d/t CPR  Hx NEDA  Hx of severe TR s/p TVR, HFpEF  Hx of Afib s/p AVN ablation - leadless PPM 7/2023: on coumadin    Recommendations:  Phase 2 weans: TM/PMV as tolerated.  VBG in am  May need the PMV removed at times as she does have some intermittent stridor suspect d/t trach size   Earliest first trach change 12/23, so likely will plan for early next week  VFSS tomorrow   Nebulized albuterol TID, mucomyst & metaneb BID   Torsemide 40 daily(increased 12/19)  VAP ppx: chlorhexidine BID, HOB 30 degrees  GI ppx: famotidine   DVT ppx: lovenox subcutaneous and coumadin   Routine trach care per protocol   PT/OT/SLP     Subjective:   - awake and sitting up in the chair, interactive and asking questions    Tracheal secretions:  Overnight -  4x white yellow thick    Cough strength: strong    Ventilator weaning results  12/18: TM for 3.5hr.  Break on vent.  Then TM again for 7hr.  PMV for 30 min   12/19:  "TM/PMV for 13.5hr   12/20: off vent overnight on PMV, VBG  7.42, 55    Clinical status discussed today with respiratory therapist       Medications:      dextrose      - MEDICATION INSTRUCTIONS -        acetylcysteine  2 mL Nebulization BID    albuterol  2.5 mg Nebulization 3 times daily    aspirin  81 mg Oral or Feeding Tube Daily    chlorhexidine  15 mL Mouth/Throat Q12H    enoxaparin ANTICOAGULANT  40 mg Subcutaneous Q24H    escitalopram  10 mg Oral or Feeding Tube Daily    famotidine  20 mg Oral or Feeding Tube Daily    insulin aspart  1-6 Units Subcutaneous Q6H    loperamide  2 mg Per Feeding Tube BID 09 12    Melatonin  5 mg Per Feeding Tube QPM    miconazole   Topical BID    mirtazapine  15 mg Oral or Feeding Tube At Bedtime    multivitamins w/minerals  15 mL Per Feeding Tube Daily    protein modular  1 packet Per Feeding Tube Daily    saccharomyces boulardii  250 mg Oral or Feeding Tube BID    sodium chloride (PF)  10 mL Intracatheter Q8H    torsemide  40 mg Per Feeding Tube Daily    Warfarin Therapy Reminder  1 each Oral See Admin Instructions         Exam/Data:   Vitals  BP (!) 140/63 (BP Location: Left arm, Patient Position: Sitting, Cuff Size: Adult Regular)   Pulse 60   Temp 98.3  F (36.8  C) (Oral)   Resp 26   Ht 1.613 m (5' 3.5\")   Wt 76.1 kg (167 lb 11.2 oz)   SpO2 99%   BMI 29.24 kg/m       I/O last 3 completed shifts:  In: 2052 [I.V.:60; NG/GT:1992]  Out: 750 [Urine:750]  Weight change: 0.1 kg (3.5 oz)    Vent Mode: Trach collar  FiO2 (%): 30 %  Resp Rate (Set): 18 breaths/min  Tidal Volume (Set, mL): 390 mL  PEEP (cm H2O): 5 cmH2O  Resp: 26      EXAM:  Gen: NAD in chair on TM/PMV  HEENT: NG tube in place, trach midline/intact   CV: RRR, no m/g/r  Resp: diminished b/l; non-labored, no wheeze   Abd: soft, nontender  Skin: no rashes or lesions on visible skin  Ext: 2+ b/l lower extremity edema  Neuro: alert, follows commands     Labs:  Arterial Blood Gases   No lab results found in last 7 " days.    Complete Blood Count   Recent Labs   Lab 12/21/23  0542 12/21/23  0534 12/20/23  0613 12/19/23  0614 12/18/23  0517   WBC  --  5.8 5.7 5.8 5.6   HGB 8.0* 7.6* 7.8* 7.4* 7.4*   PLT  --  245 242 244 253     Basic Metabolic Panel  Recent Labs   Lab 12/21/23  1231 12/21/23  0546 12/21/23  0542 12/21/23  0534 12/20/23  0620 12/20/23  0613 12/19/23  1136 12/19/23  0614 12/18/23  0528 12/18/23  0517   NA  --   --  139 141  --  141  --  142  --  144   POTASSIUM  --   --  4.0 4.2  --  4.1  --  4.7  --  4.1   CHLORIDE  --   --   --  99  --  101  --  103  --  105   CO2  --   --   --  33*  --  33*  --  34*  --  34*   BUN  --   --   --  30.6*  --  30.9*  --  27.3*  --  28.4*   CR  --   --   --  0.82  --  0.84  --  0.76  --  0.74   * 108* 117 114*   < > 114*   < > 125*   < > 116*    < > = values in this interval not displayed.     Liver Function Tests  Recent Labs   Lab 12/21/23  0534 12/20/23  0613 12/19/23  0614 12/18/23  0517   AST 23 26 29 22   ALT 20 22 21 20   ALKPHOS 144 144 131 122   BILITOTAL 0.6 0.6 0.5 0.4   ALBUMIN 3.0* 2.9* 2.8* 2.7*   INR 1.53* 1.36* 1.30* 1.12     Coagulation Profile  Recent Labs   Lab 12/21/23  0534 12/20/23  0613 12/19/23  0614 12/18/23  0517   INR 1.53* 1.36* 1.30* 1.12       Radiology: Personally reviewed; radiology read below    XR CHEST 12/19/2023  Poststernotomy changes with a prosthetic TVR and tracheostomy tube tube in place. Feeding tube can be seen coursing into the stomach. Right upper extremity PICC line catheter overlies the proximal right atrium. Elevation of the right hemidiaphragm, unchanged with small right effusion and right basilar and likely middle lobe opacities, presumed atelectasis. Left lung is clear. Heart is slightly enlarged. No signs of failure.

## 2023-12-21 NOTE — PROGRESS NOTES
RT PROGRESS NOTE     DATA:     CURRENT SETTINGS:             TRACH TYPE / SIZE:  #6 Flip (placed 12/13)             MODE:   TM/PMV             FIO2:   30%/30L     ACTION:             THERAPIES:   Albuterol TID, Mucomyst/Metaneb BID             SUCTION:                           FREQUENCY:   x2                        AMOUNT:   Small to Moderate                        CONSISTENCY:   Pale Yellow                        COLOR:   Thick             SPONTANEOUS COUGH EFFORT/STRENGTH OF EFFORT (not elicited by suctioning): Moderate Spontaneous Cough                           WEANING PHASE:   Phase 2                        WEAN MODE:    30%/30L TM/PMV                        WEAN TIME:   Continuous                        END WEAN REASON:   NA     RESPONSE:             BS:   Clear/Diminished             VITAL SIGNS:   Sating 95-99%, HR 60-62, RR 24-28             EMOTIONAL NEEDS / CONCERNS:  NA                RISK FOR SELF DECANNULATION:  No     NOTE / PLAN:   TM/PMV as tolerated.  If increased WOB or stridor may need to remove the PMV.  RT will continue to monitor.

## 2023-12-21 NOTE — PROGRESS NOTES
[facility]    Medicine Progress Note - Hospitalist Service    Date of Admission:  12/15/2023    Brief summary:  Priya Funez is a 83 year old female with a PMHx of HFpEF, severe TR, permanent AF s/p AVN ablation with PPM implant 1/11/19 s/p extraction TV (transvenous) PPM and implant leadless PPM 7/31/23, emergent aortic dissection repair 1/4/19, HTN, CKD, NEDA, and obesity who presented to [facility] as an outpatient for elective TVR done on 11/28/2023.  Immediate postoperative course was complicated by acute hypoxic and hypercapnic respiratory failure requiring BiPAP and probable pneumonia and treated with 7 days of IV Zosyn, patient was transferred to surgical telemetry floor on 12/2/2020.  On 12/6/2023 she was transferred back to CVICU after RRT for acute hypoxic respiratory distress and subsequent respiratory arrest with short CPR, re-intubation, and ROSC achieved. She sustained mildly displaced and nondisplaced right 3-5 rib fractures with associated right chest wall hematoma and nondisplaced anterolateral left 4th and 5th rib fractures. She was treated with meropenem 7 day course. She also has hx of recent C diff and multiple loose stools, for which she completed a 7 day po vancomycin course (per antimicrobial stewardship team, more likely to be colonization with recent history and no apparent treatment). Patient was transiently hyperglycemic and treated with insulin infusion then transitioned to sliding scale insulin per protocol. Blood sugars remained stable, has not required insulin coverage.  She remained intubated in the CVICU until undergoing tracheostomy by Dr. Pitts on 12/13/23, doing well with trach dome and PS currently.  She was treated for volume overload with diuretics (Bumex and chlorothiazide) with subsequent hypernatremia and hypotension requiring FWF and pressor support.  Spironolactone, torsemide and metoprolol discontinued and midodrine started with now stable blood pressures. Pre-op weight 70.9 kg,  discharge weight 74.1 kg (163 lbs) on 12/15/2023.  Patient was discharged to LTACH 12/15/2023 for continuing vent weaning, therapies     LTACH course:  12/17: Vitals stable.  Scheduled midodrine discontinued with BP stable in the 140s.  Diuretic therapy with torsemide restarted given peripheral edema and volume status, however at lower dose of 10 mg daily (home dose 40 mg daily) titrate slowly based on BP response.  Resume metoprolol for A-fib rate control as well gradually.  Labs reviewed, stable electrolytes, creatinine 0.82. ProBNP 3710, down from 48192 and 78253.  Hgb 8.0 from 7.6.  INR 0.98 from 1.12.  Continue heparin and Coumadin together for now until INR therapeutic.  Started hydroxyzine for anxiety 12/17.  A.m. labs ordered.      12/18-12/21:  increased torsemide to 20 mg po daily due to peripheral edema on 12/18, having loose stools,  changed tube feeds to higher fiber formula and add imodium daily and prn.  Still with edema,  increase toresemide to 40 mg po daily on 12/19 and increased imodium to bid and prn, changed heparin to lovenox for pt comfort.  She is getting prn hydroxyzine for anxiety.    Assessment & Plan      #Acute respiratory failure and arrest on 12/6/23   #Acute hypercapnic and hypoxic respiratory failure requiring BiPAP  # s/p tracheostomy 12/13/23  # s/p Mechanical ventilation  #Aspiration pneumonitis vs pneumonia  #Possible aspiration event 12/1  #Near complete plugging of left lower lobe on CT   #Acute mildly displaced and nondisplaced right 3-5 rib fractures with associated right chest wall hematoma and nondisplaced anterolateral left fourth and fifth rib fractures  -Admit to LTACH 12/15/2023 to continue vent weaning, therapies  -Pulmonology consult for vent weaning process  -RT consult for tracheostomy cares, oxygenation, nebs, pulmonary therapies  - Tracheostomy sutures to be removed POD#5 (12/18/2023)  - Mucomyst, duoneb w/ RT Q4H   - Trach dome as much as tolerated   - Continue  therapy with RT   - SLP speaking valve as tolerated      # S/p respiratory arrest 12/6  # Hx of severe TR s/p TVR  # HFpEF  # Hx of Afib s/p AVN ablation - PPM, upgraded to leadless 7/2023  # Hypertension  # Hyperlipidemia  # Hx of aortic dissection repair 2019  # Respiratory arrest 12/6   # Elevated BNP, improving   - Stable paced rhythm with underlying atrial flutter. On review of cardiology, underlying aflutter/afib has been present since at least June of this year. Device RN adjusted PPM to PTA settings rate 60 at [facility].   - Pre-op 9/8/23 echo: LVEF 55/60%, mildly dilated RV, normal function  - Echo 12/4 with LVEF 60-65%, normal RV function, TV mean gradient 5 mmHg, no TR  - Echo 12/7 with no significant change from previous with the exception of mild reduction of RV. RV free wall dyskinesia.   - ASA 81 mg daily  -Hold scheduled midodrine 10 mg Q8H since BP now in the 140s systolic and stable on 12/16  -Continue midodrine 5 mg TID PRN for SBP<100   - Held PTA spironolactone, torsemide, metoprolol due to hypotension at prior hospital  - FLUID STATUS: Pre-op weight 70.9 kg, discharge weight 74.1 kg (163 lbs) on 12/15/2023 and 165 lbs on 12/16  -increased torsemide to 40 mg po daily on 12/19  -proBNP trend appears improved from a peak of 54512 to now 3710.  - Started Warfarin 12/15.  Pharmacy consulted for Coumadin dosing (INR goal 2-3)   -Continue lovenox (DVT prophylaxis dose) and Coumadin together for now until INR therapeutic     # Multiple loose stools   # Hx of C. Difficile (9/2023)  # Severe malnutrition in the context of acute illness  - Nutrition consulted for continuing tube feedings- will change to higher fiber tube feeds - on 12/18  - NJT and enteral feeds at goal (45 mL/hr)   - SLP: NPO  - PPI famotidine  - Bowel regimen discontinued due to continued loose stools  - Finished vancomycin po 7 day course   - Continue Banatrol  - PRN Zofran  4 mg PO or IV Q6H  - PRN Compazine 5 mg IV Q6H   - PRN Imodium QID       #Hypernatremia   #Hypokalemia, resolved   #CKD stage 3  #Lactic acidosis, resolved  #IMAN on CKD, resolved   Baseline creat ~1.2-1.3   FLUID STATUS: Pre-op weight 70.9 kg, discharge weight 74.1 kg (163 lbs).   - Held PTA spironolactone 25 mg daily  - Held PTA torsemide   - Elevated sodium, Na 151 --> 147 on recheck, likely 2/2 aggressive diureses S/p bumex and chlorotiazide prior to transfer  -On free water flushes @45 mL Q1H, changed to 200 mL every 4 hourly on 12/16  - I/O has been inaccurate due to unmeasured stools and voids   - Replete lytes per protocol  - Avoid/limit nephrotoxins as able  - Strict I/O, daily weights, avoid/limit nephrotoxins     #Stress induced hyperglycemia  #T2DM  Hgb A1c 5.7%  - Medium Sliding scale insulin   - PTA Jardiance on hold, resume once medically optimized   - Goal BG <180 for optimal healing     # Stress induced leukocytosis  # Probable aspiration pneumonitis vs pneumonia, resolved  - 11/30 Zosyn 7-day course for pneumonia (finished)   - 12/6 Given further decompensation, Vanco and meropenem was started on 12/6   - Finished meropenem 7 day course (12/12)   - Finished Vancomycin PO 7 day course (for c-diff) (12/13)  - Blood culture with NGTD   - Urine with Candida and sputum with Yeast, likely both colonizations.   - Urine culture with NGTD     #Hx of recent C. Difficile  - C. Difficile toxin PCR positive 12/3 with negative antigen and negative toxin - per antimicrobial stewardship team, more likely to be colonization with recent history and no apparent treatment  - Finished oral vancomycin course on 12/13  - Monitor fever curve, WBC, and inflammatory markers as appropriate  - Enteric precautions per infection prevention protocol      # 3rd, 4th right rib fractures due to CPR  -Continue mini thoracotomy precautions  -Judicious use of narcotics, de-escalate as able    # Acute blood loss anemia  - Hgb has been stable.   -Hgb 7.6 on 12/16 and 8.0 on 12/17/2023  -Iron studies:  "Serum iron 29, iron binding capacity 216, saturation index 13%.  -Follow CBC with weekly labs.   -Transfuse if Hgb<7    #Acute postoperative pain  - PRN: Tylenol, oxycodone    #Hx of general anxiety disorder  - PTA Lexapro and Remeron  - Scheduled Melatonin HS     # Perineal irritation    - WOC consulted        Diet: Adult Formula Drip Feeding: Continuous Jevity 1.5; Nasojejunal; Goal Rate: 50; mL/hr; TF to run x22 hours holding 1 hr before/after warfarin  NPO for Medical/Clinical Reasons Except for: Ice Chips, NPO but receiving Tube Feeding    DVT Prophylaxis: Heparin SQ and Warfarin  Barrientos Catheter: Not present  Lines: PRESENT      PICC 12/07/23 Triple Lumen Right Basilic ok to use PICC-Site Assessment: WDL;Other (Comment) (bruised)      Cardiac Monitoring: None  Code Status: Full Code      Clinically Significant Risk Factors          # Hypocalcemia: Lowest iCa = 1.19 mg/dL in last 2 days, will monitor and replace as appropriate     # Hypoalbuminemia: Lowest albumin = 2.7 g/dL at 12/18/2023  5:17 AM, will monitor as appropriate     # Hypertension: Noted on problem list    # Chronic heart failure with preserved ejection fraction: heart failure noted on problem list and last echo with EF >50%       # Overweight: Estimated body mass index is 29.48 kg/m  as calculated from the following:    Height as of this encounter: 1.613 m (5' 3.5\").    Weight as of this encounter: 76.7 kg (169 lb 1.5 oz).   # Severe Malnutrition: based on nutrition assessment      # Financial/Environmental Concerns: none   # Pacemaker present  # History of CABG: noted on surgical history       Disposition Plan      Expected Discharge Date: 01/02/2024      Destination: home with family;foster/protective services              Clarissa Ndiaye MD  Hospitalist Service  LTACH  Securely message with [employer] (more info)  Text page via [employer] Paging/Directory   ______________________________________________________________________    Interval History "   LINDA malagona improved from yesterday  No chest pain. +intermittent dyspnea, improved since yesterday       Physical Exam   Vital Signs: Temp: 98.3  F (36.8  C) Temp src: Oral BP: (!) 140/63 Pulse: 78   Resp: 28 SpO2: 95 % O2 Device: Trach dome Oxygen Delivery: 30 LPM  Weight: 169 lbs 1.49 oz  GENERAL: Alert, oriented, conversant, in no distress.   EYES: Normal conjunctiva. Sclera anicteric. Nonystagmus. Extraocular movements intact.   NECK: Supple, no lymph adenopathy. JVP is not distended.  Tracheostomy in place  NOSE: NJ tube in place  LUNGS: Clear to auscultation. No ronchi or crackles. Equal air entry bilaterally.   HEART: S1 S2, Rate and rhythm is regular. No murmurs  ABDOMEN: Soft, nontender, no distension. Bowel sounds are positive. No guarding or rebound.   EXTREMITIES: 2+ pitting edema  SKIN: No rash or ulcers.   NEUROLOGIC: Alert and oriented x3.  Mouthing words, communicates by writing appropriately.  Clear mentation. Motor, sensory and cranial exam is grossly intact andsymmetric.   PSYCHIATRIC: Normal affect and cognition       Medical Decision Making       51 MINUTES SPENT BY ME on the date of service doing chart review, history, exam, documentation & further activities per the note.      Data     I have personally reviewed the following data over the past 24 hrs:    5.8  \   8.0 (L)   / 245     139 N/A N/A /  108 (H)   4.0 N/A N/A \     ALT: N/A AST: N/A AP: N/A TBILI: N/A   ALB: N/A TOT PROTEIN: N/A LIPASE: N/A     Procal: N/A CRP: N/A Lactic Acid: 0.8       INR:  N/A PTT:  N/A   D-dimer:  N/A Fibrinogen:  N/A       Imaging results reviewed over the past 24 hrs:   No results found for this or any previous visit (from the past 24 hour(s)).    Recent Labs   Lab 12/21/23  0546 12/21/23  0542 12/21/23  0534 12/21/23  0002 12/20/23  0620 12/20/23  0613 12/19/23  1136 12/19/23  0614 12/18/23  0528 12/18/23  0517   WBC  --   --  5.8  --   --  5.7  --  5.8  --  5.6   HGB  --  8.0* 7.6*  --   --  7.8*  --  7.4*   --  7.4*   MCV  --   --  96  --   --  97  --  98  --  98   PLT  --   --  245  --   --  242  --  244  --  253   INR  --   --   --   --   --  1.36*  --  1.30*  --  1.12   NA  --  139  --   --   --  141  --  142  --  144   POTASSIUM  --  4.0  --   --   --  4.1  --  4.7  --  4.1   CHLORIDE  --   --   --   --   --  101  --  103  --  105   CO2  --   --   --   --   --  33*  --  34*  --  34*   BUN  --   --   --   --   --  30.9*  --  27.3*  --  28.4*   CR  --   --   --   --   --  0.84  --  0.76  --  0.74   ANIONGAP  --   --   --   --   --  7  --  5*  --  5*   BESS  --   --   --   --   --  8.7*  --  8.8  --  8.7*   * 117  --  115*   < > 114*   < > 125*   < > 116*   ALBUMIN  --   --   --   --   --  2.9*  --  2.8*  --  2.7*   PROTTOTAL  --   --   --   --   --  5.7*  --  5.3*  --  5.2*   BILITOTAL  --   --   --   --   --  0.6  --  0.5  --  0.4   ALKPHOS  --   --   --   --   --  144  --  131  --  122   ALT  --   --   --   --   --  22  --  21  --  20   AST  --   --   --   --   --  26  --  29  --  22    < > = values in this interval not displayed.       Most Recent 3 CBC's:  Recent Labs   Lab Test 12/21/23 0542 12/21/23  0534 12/20/23  0613 12/19/23  0614   WBC  --  5.8 5.7 5.8   HGB 8.0* 7.6* 7.8* 7.4*   MCV  --  96 97 98   PLT  --  245 242 244     Most Recent 3 BMP's:  Recent Labs   Lab Test 12/21/23  0546 12/21/23  0542 12/21/23  0002 12/20/23  0620 12/20/23  0613 12/19/23  1136 12/19/23  0614 12/18/23  0528 12/18/23  0517   NA  --  139  --   --  141  --  142  --  144   POTASSIUM  --  4.0  --   --  4.1  --  4.7  --  4.1   CHLORIDE  --   --   --   --  101  --  103  --  105   CO2  --   --   --   --  33*  --  34*  --  34*   BUN  --   --   --   --  30.9*  --  27.3*  --  28.4*   CR  --   --   --   --  0.84  --  0.76  --  0.74   ANIONGAP  --   --   --   --  7  --  5*  --  5*   BESS  --   --   --   --  8.7*  --  8.8  --  8.7*   * 117 115*   < > 114*   < > 125*   < > 116*    < > = values in this interval not displayed.      Most Recent 2 LFT's:  Recent Labs   Lab Test 12/20/23  0613 12/19/23  0614   AST 26 29   ALT 22 21   ALKPHOS 144 131   BILITOTAL 0.6 0.5     Most Recent 3 INR's:  Recent Labs   Lab Test 12/20/23  0613 12/19/23  0614 12/18/23  0517   INR 1.36* 1.30* 1.12     Most Recent 3 BNP's:  Recent Labs   Lab Test 12/17/23  0600 12/09/23  0338 12/07/23  0128 12/06/23  1720 08/30/23  1406 06/23/21  0926   NTBNPI 3,710* 22,193* 30,691*  --   --   --    NTBNP  --   --   --  33,124* 3,249* 3,163*     Most Recent 6 glucoses:  Recent Labs   Lab Test 12/21/23  0546 12/21/23  0542 12/21/23  0002 12/20/23  1739 12/20/23  1156 12/20/23  0620   * 117 115* 97 126* 99     Most Recent ABG:  Recent Labs   Lab Test 12/14/23  0745   PH 7.45   PO2 95   PCO2 44   HCO3 31*   FABIANA 6.1*     Most Recent ESR & CRP:  Recent Labs   Lab Test 12/06/23  1720   CRPI 73.40*

## 2023-12-21 NOTE — PROGRESS NOTES
"SPEECH PATHOLOGY: VIDEO SWALLOW STUDY   12/21/23 1200   Appointment Info   Signing Clinician's Name / Credentials (SLP) David Coronado MA [facility] SLP   Rehab Comments (SLP) Nasal feeding tube in place.   General Information   Onset of Illness/Injury or Date of Surgery 11/28/23   Referring Physician TATIANA Kennedy   Patient/Family Therapy Goal Statement (SLP) To eat and drink   Pertinent History of Current Problem Per pulmonology note: \"83 yoF with PMH of HFpEF, severe TR, permanent AF s/p ablation & leadless PPM 7/31/23, emergent aortic dissection repair 1/2019, HTN, CKD, NEDA, and obesity who presented as an outpatient for elective TVR completed on 11/28/2023. Subsequently had respiratory arrest on 12/6 with short CPR prior to ROSC. Treated for presumed pneumonia with multiple courses of abx, now off.  S/p trach on 12/13. She has been progressing with trach dome and intermittently on PS. Fluid overloaded treated with diuretics.\"   General Observations Alert and cooperative. Sitting upright in chair   Type of Evaluation   Type of Evaluation Swallow Evaluation   Tracheostomy Assessment (Speaking Valve)   Comment, Tracheostomy (Speaking Valve) #6 Shiley, speaking valve in place.   Respiratory Status (Speaking Valve)   Oxygen Supply trach mask   General Swallowing Observations   Current Diet/Method of Nutritional Intake (General Swallowing Observations, NIS) NPO;ice chips;nasogastric tube (NG)   Past History of Dysphagia Clinical swallow evaluation 12/15/2023 at [facility] with recomendation for NPO except for ice chips. Per SLP note 12/15/2023: \"Pt familiar to SLP caseload during admission to [place] in 2019. At that time, pt complete VFSS which demonstrated penetration with thin liquids and aspiration w/cough of mildly thick liquids. A regular diet and honey thick liquids was recommended at that time. Since then, pt has not followed with SLP and self-advanced to thin liquids. Pt denied any recent trouble swallowing " "and reported no hx of PNA. Pt evaluated earlier this admission with recommendations for regular diet and thin liquids. SLP offerred repeat VFSS at that time d/t report hx of aspiration, however pt politely declined d/t no concerns related to swallowing. Pt now apprehensive regarding eating/drinking and wanting to 'take things slow'\".   Swallowing Evaluation Videofluoroscopic swallow study (VFSS)   VFSS Evaluation   Views Taken left lateral   VFSS Textures Trialed thin liquids;mildly thick liquids;moderately thick liquids/liquidized;pureed;solid foods   VFSS Eval: Thin Liquid Texture Trial   Mode of Presentation, Thin Liquid cup;straw   Oral Phase, Thin Liquid premature pharyngeal entry   Bolus Location When Swallow Triggered pyriforms;posterior laryngeal surface of epiglottis   Pharyngeal Phase, Thin Liquid impaired tongue base retraction;residue in pyriform sinus   Rosenbek's Penetration Aspiration Scale: Thin Liquid Trial Results 3 - contrast remains above the vocal cords, visible residue remains (penetration)   Strategies and Compensations chin tuck;reduce bolus size   Diagnostic Statement Inconsistent laryngeal penetration, shallow to deep. Chin tuck appeared to have some benefit with use of cup but less so with straw. Small sip strategy also showed some potential, but still inconsistent.   VFSS Eval: Mildly Thick Liquids   Mode of Presentation cup   Preparatory Phase WFL   Oral Phase premature pharyngeal entry   Bolus Location When Swallow Triggered posterior laryngeal surface of epiglottis;pyriforms   Pharyngeal Phase impaired epiglottic movement;residue in pyriform sinus;impaired tongue base retraction   Rosenbek's Penetration Aspiration Scale 3 - contrast remains above the vocal cords, visible residue remains (penetration)   Strategies and Compensations chin tuck;reduce bolus size   Diagnostic Statement Inconsistent penetration, shallow to moderate depth,with mildly thick liquids. Use of chin tuck and small " sip strategy was somewhat successful in reducing penetration.   VFSS Eval: Moderately Thick Liquids   Mode of Presentation spoon   Preparatory Phase WFL   Oral Phase WFL   Bolus Location When Swallow Triggered posterior angle of ramus   Pharyngeal Phase impaired tongue base retraction;residue in vallecula;residue in pyriform sinus   Rosenbek's Penetration Aspiration Scale 3 - contrast remains above the vocal cords, visible residue remains (penetration)   Diagnostic Statement Laryngeal penetration, moderate to deep, noted after swallow from kickback from residue in valleculae and pyriform sinuses. This did not spontaneously clear.   VFSS Evaluation: Puree Solid Texture Trial   Mode of Presentation, Puree spoon   Preparatory Phase WFL   Oral Phase, Puree premature pharyngeal entry   Bolus Location When Swallow Triggered valleculae   Pharyngeal Phase, Puree residue in vallecula   Rosenbek's Penetration Aspiration Scale: Puree Food Trial Results 1 - no aspiration, contrast does not enter airway   VFSS Evaluation: Solid Food Texture Trial   Mode of Presentation, Solid self-fed;other (see comments)  (extremely small bites. Patient reports natural bite size varies depending on item.)   Preparatory Phase insufficient mastication   Oral Phase, Solid residue in oral cavity   Bolus Location When Swallow Triggered valleculae   Rosenbek's Penetration Aspiration Scale: Solid Food Trial Results 1 - no aspiration, contrast does not enter airway   Esophageal Phase of Swallow   Patient reports or presents with symptoms of esophageal dysphagia No   Swallowing Recommendations   Diet Consistency Recommendations NPO;ice chips only   Comment, Swallowing Recommendations Epiglottic movement was incomplete, variable between posterior-inferior movement to past horizontal. It is possible that presence of nasal feeding tube is impeding epiglottic inversion and airway closure.   General Therapy Interventions   Planned Therapy Interventions  Dysphagia Treatment   Dysphagia treatment Instruction of safe swallow strategies;Compensatory strategies for swallowing;Modified diet education   Clinical Impression   Criteria for Skilled Therapeutic Interventions Met (SLP Eval) Yes, treatment indicated   SLP Diagnosis Dysphagia   Risks & Benefits of therapy have been explained evaluation/treatment results reviewed;care plan/treatment goals reviewed;risks/benefits reviewed;participants voiced agreement with care plan;participants included;patient   Clinical Impression Comments Patient presents with mild-moderate oropharyngeal dysphagia characterized by variability in timeliness of swallow initiation and airway closure, as well as pharyngeal clearance, leading to inconsistent laryngeal penetration with multiple consistencies, with greatest risk with thinner consistencies from direct aspiration and greater risk with thicker consistencies from kickback after the swallow from pharyngeal stasis. It is possible that trach size as well as presence of nasal feeding tube are negatively impacting swallow function. Per pulmonary, anticipate possible trach downsize early next week, which may be of benefit to swallowing. At this time, d/t variability of swallow function, patient is not appropriate to initiate diet at this time. Speech will follow for po trials. Likely re-assess in 1 week.   SLP Total Evaluation Time   Evaluation, videofluoroscopic eval of swallow function Minutes (34571) 15   SLP Goals   Therapy Frequency (SLP Eval) 5 times/week   SLP Predicted Duration/Target Date for Goal Attainment 02/26/24   SLP Goals Swallow   SLP: Safely tolerate diet without signs/symptoms of aspiration One P.O. texture   Swallowing Dysfunction &/or Oral Function for Feeding   Treatment of Swallowing Dysfunction &/or Oral Function for Feeding Minutes (10925) 8   Treatment Detail/Skilled Intervention Under fluoroscopy, patient was instructed in use of chin tuck and small sip strategy.  Patient did demonstrate understanding of instruction as evidenced by proper execution of strategy, but was not consistently implemented. Appeared to be impacted by decreased attention/distractibility. Result: Strategy Chin tuck and small sip strategy was effective in reducing risk of entrance into the airway with mildly thick liquids, inconsistently with thin liquids.   SLP Discharge Planning   SLP Plan po trials of puree and mildly thick with chin tuck, pharyngeal exercises   SLP Discharge Recommendation Acute [facility]hab [facility]-Motivated patient will benefit from intensive, interdisciplinary therapy.  Anticipate will be able to tolerate 3 hours of therapy per day   SLP Rationale for DC Rec Aphonia & dysphagia s/p trach   SLP Brief overview of current status  NPO except ice chips and trial feeding with speech therapy only   Total Session Time   Total Session Time (sum of timed and untimed services) 23

## 2023-12-21 NOTE — PLAN OF CARE
Problem: Mechanical Ventilation Invasive  Goal: Effective Communication  12/17/2023 0020 by Allison Casarez, RT  Outcome: Progressing  12/17/2023 0019 by Allison Casarez, RT  Outcome: Progressing  Goal: Optimal Device Function  12/17/2023 0020 by Allison Casarez, RT  Outcome: Progressing  12/17/2023 0019 by Allison Casarez, RT  Outcome: Progressing  Intervention: Optimize Device Care and Function  Recent Flowsheet Documentation  Taken 12/16/2023 2225 by Allison Casarez, RT  Airway Safety Measures: all equipment/monitors on and audible  Taken 12/16/2023 2221 by Allison Casarez, RT  Airway Safety Measures: all equipment/monitors on and audible  Goal: Mechanical Ventilation Liberation  Outcome: Progressing  Goal: Optimal Nutrition Delivery  12/17/2023 0020 by Allison Casarez, RT  Outcome: Progressing  12/17/2023 0019 by Allison Casarez, RT  Outcome: Progressing  Goal: Absence of Device-Related Skin and Tissue Injury  12/17/2023 0020 by Allison Casarez, RT  Outcome: Progressing  12/17/2023 0019 by Allison Casarez, RT  Outcome: Progressing  Goal: Absence of Ventilator-Induced Lung Injury  12/17/2023 0020 by Allison Casarez, RT  Outcome: Progressing  12/17/2023 0019 by Allison Casarez, RT  Outcome: Progressing   RT PROGRESS NOTE   [id number]  DATA:     CURRENT SETTINGS:             TRACH TYPE / SIZE: #6 Flip, placed on 12/13/23             MODE: TM 30% 30L (AC 18 390 +5  24%)             FIO2:        ACTION:             THERAPIES: Albuterol/Mucomyst neb TID, Metaneb BID             SUCTION:                           FREQUENCY:  X4 for white to p-yellow thickish secr.                         AMOUNT:                           CONSISTENCY:                           COLOR:                SPONTANEOUS COUGH EFFORT/STRENGTH OF EFFORT (not elicited by suctioning):                               WEANING PHASE: 2                          WEAN MODE:  TM 30% 30L started on Day shift at 0809 a.m., PMV on Days for or 7hr 20 min +30  min, marbella well  On Night shift PMV for 3hr 05 min, taken off during deep sleep, resumed at 0445a.m.                           WEAN TIME:                           END WEAN REASON: 1st noc off the vent     RESPONSE:             BS:                VITAL SIGNS:                EMOTIONAL NEEDS / CONCERNS:                  RISK FOR SELF DECANNULATION:                          RISK DUE TO:                          INTERVENTION/S IN PLACE IS/ARE:         NOTE / PLAN:   Goal Outcome Evaluation:      If pt off vent all noc - a.m. VBG's     Cont to monitor and treat

## 2023-12-21 NOTE — PLAN OF CARE
Problem: Adult Inpatient Plan of Care  Goal: Optimal Comfort and Wellbeing  Outcome: Progressing  Intervention: Provide Person-Centered Care  Recent Flowsheet Documentation  Taken 12/20/2023 1630 by Darcy Bean RN  Trust Relationship/Rapport: care explained  Taken 12/20/2023 0845 by Darcy Bean RN  Trust Relationship/Rapport: care explained     Problem: Anxiety Signs/Symptoms  Goal: Optimized Energy Level (Anxiety Signs/Symptoms)  12/20/2023 2232 by Darcy Bean RN  Outcome: Progressing  12/20/2023 1402 by Darcy Bean RN  Outcome: Progressing  Goal: Optimized Cognitive Function (Anxiety Signs/Symptoms)  Outcome: Progressing  Goal: Enhanced Social, Occupational or Functional Skills (Anxiety Signs/Symptoms)  Outcome: Progressing  Intervention: Promote Social, Occupational and Functional Ability  Recent Flowsheet Documentation  Taken 12/20/2023 1630 by Darcy Bean RN  Trust Relationship/Rapport: care explained  Taken 12/20/2023 0845 by Darcy Bean RN  Trust Relationship/Rapport: care explained  Goal: Improved Somatic Symptoms (Anxiety Signs/Symptoms)  Outcome: Progressing     Patient was anxious at bedtime, worried about not being able to sleep while weaning on trach mask. Given atarax, and benadryl, assisted back to bed. Will continue to reassure of cares.

## 2023-12-21 NOTE — PLAN OF CARE
Problem: Artificial Airway  Goal: Effective Communication  Outcome: Progressing  Goal: Optimal Device Function  Outcome: Progressing  Intervention: Optimize Device Care and Function  Recent Flowsheet Documentation  Taken 12/21/2023 1320 by Radha Goodrich, RT  Airway Safety Measures: all equipment/monitors on and audible  Taken 12/21/2023 1114 by Radha Goodrich, RT  Airway Safety Measures: all equipment/monitors on and audible  Taken 12/21/2023 0734 by Radha Goodrich, RT  Airway Safety Measures: all equipment/monitors on and audible  Goal: Absence of Device-Related Skin or Tissue Injury  Outcome: Progressing

## 2023-12-21 NOTE — PLAN OF CARE
Problem: Diarrhea  Goal: Effective Diarrhea Management  Outcome: Not Progressing  Intervention: Manage Diarrhea  Recent Flowsheet Documentation  Taken 12/21/2023 0500 by Judy Pino RN  Perineal Care:   perineum cleansed   absorbent brief/pad changed  Taken 12/21/2023 0100 by Judy Pino RN  Fluid/Electrolyte Management: fluids provided  Isolation Precautions:   contact precautions maintained   enteric precautions maintained  Medication Review/Management: medications reviewed     Problem: Adult Inpatient Plan of Care  Goal: Optimal Comfort and Wellbeing  Outcome: Progressing  Intervention: Monitor Pain and Promote Comfort  Recent Flowsheet Documentation  Taken 12/21/2023 0100 by Judy Pino RN  Pain Management Interventions:   emotional support   pillow support provided   quiet environment facilitated   repositioned   rest     Problem: Enteral Nutrition  Goal: Safe, Effective Therapy Delivery  Outcome: Progressing  Intervention: Prevent Feeding-Related Adverse Events  Recent Flowsheet Documentation  Taken 12/21/2023 0100 by Judy Pino RN  Enteral Feeding Safety: tubing labeled as enteral feeding      Goal Outcome Evaluation:      Vitals stable. Verbalized discomfort while in bed but was repositioned and she said it helped. Voided X1 and incontinent of BM x1 which was loose in moderate amount. Pt complains of abdominal discomfort  / bloating . PRN Simethicone given at 0100 with relief,      Pt woke up at 0500 and wanted to stay in her chair. Pt slept for about 4 hours.Noted to be anxious but was redirectable.

## 2023-12-21 NOTE — PLAN OF CARE
Problem: Mechanical Ventilation Invasive  Goal: Effective Communication  Outcome: Met  Goal: Optimal Device Function  Outcome: Met  Intervention: Optimize Device Care and Function  Recent Flowsheet Documentation  Taken 12/21/2023 1320 by Radha Goodrich, RT  Airway Safety Measures: all equipment/monitors on and audible  Taken 12/21/2023 1114 by Radha Goodrich, RT  Airway Safety Measures: all equipment/monitors on and audible  Taken 12/21/2023 0734 by Radha Goodrich, RT  Airway Safety Measures: all equipment/monitors on and audible  Goal: Mechanical Ventilation Liberation  Outcome: Met  Goal: Optimal Nutrition Delivery  Outcome: Met  Goal: Absence of Device-Related Skin and Tissue Injury  Outcome: Met  Goal: Absence of Ventilator-Induced Lung Injury  Outcome: Met

## 2023-12-22 NOTE — PLAN OF CARE
Problem: Mechanical Ventilation Invasive  Goal: Effective Communication  12/17/2023 0020 by Allison Casarez, RT  Outcome: Progressing  12/17/2023 0019 by Allison Casarez, RT  Outcome: Progressing  Goal: Optimal Device Function  12/17/2023 0020 by Allison Casarez, RT  Outcome: Progressing  12/17/2023 0019 by Allison Casarez, RT  Outcome: Progressing  Intervention: Optimize Device Care and Function  Recent Flowsheet Documentation  Taken 12/16/2023 2225 by Allison Casarez, RT  Airway Safety Measures: all equipment/monitors on and audible  Taken 12/16/2023 2221 by Allison Casarez, RT  Airway Safety Measures: all equipment/monitors on and audible  Goal: Mechanical Ventilation Liberation  Outcome: Progressing  Goal: Optimal Nutrition Delivery  12/17/2023 0020 by Allison Casarez, RT  Outcome: Progressing  12/17/2023 0019 by Allison Casarez, RT  Outcome: Progressing  Goal: Absence of Device-Related Skin and Tissue Injury  12/17/2023 0020 by Allison Casarez, RT  Outcome: Progressing  12/17/2023 0019 by Allison Casarez, RT  Outcome: Progressing  Goal: Absence of Ventilator-Induced Lung Injury  12/17/2023 0020 by Allison Casarez, RT  Outcome: Progressing  12/17/2023 0019 by Allison Casarez, RT  Outcome: Progressing   RT PROGRESS NOTE   [id number]  DATA:     CURRENT SETTINGS:             TRACH TYPE / SIZE: #6 Flip, placed on 12/13/23             MODE: TM 28% 30L (AC 18 390 +5  24%)             FIO2:        ACTION:             THERAPIES: Albuterol TID/Mucomyst neb BID, Metaneb BID             SUCTION:                           FREQUENCY:  X2 for small white to p-yellow thickish secr.                         AMOUNT:                           CONSISTENCY:                           COLOR:                SPONTANEOUS COUGH EFFORT/STRENGTH OF EFFORT (not elicited by suctioning):                               WEANING PHASE: 2                          WEAN MODE:  TM 28% 30L cont since yesterday a.m., PMV for 17.5 hrs (0445a.m -  2225), was taken off at Night  during deep sleep, resumed at 0105a.m.                           WEAN TIME:                           END WEAN REASON: 2nd noc off the vent     RESPONSE:             BS:                VITAL SIGNS:                EMOTIONAL NEEDS / CONCERNS:                  RISK FOR SELF DECANNULATION:                          RISK DUE TO:                          INTERVENTION/S IN PLACE IS/ARE:         NOTE / PLAN:   Goal Outcome Evaluation:     Cont with phase 2, downsize trach soon. As of Dr Blas, early next week.     Cont to monitor and treat

## 2023-12-22 NOTE — PROGRESS NOTES
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 Subsequently had respiratory arrest on 12/6 with short CPR prior to ROSC. Treated for presumed pneumonia with multiple courses of abx, now off.  S/p trach on 12/13. She has been progressing with trach dome and intermittently on PS. Fluid overloaded treated with diuretics.   Assessment/Plan:     Problems:  Acute hypercapnic and hypoxic respiratory failure s/p trach: CXR 12/18 with RLL/RML atelectasis and small R pleural effusion, L lung clear.   Tracheostomy in place: 6 Shiley placed 12/13. Surgery more complex d/t scarred tissue from previous trach in 2019  Dysphagia s/p NJ: passed BDT  Aspiration pneumonitis vs pneumonia - off antibiotics   Respiratory/cardiac arrest on 12/6 possibly d/t mucus plugging.  Multiple rib fx d/t CPR  Hx NEDA  Hx of severe TR s/p TVR, HFpEF  Hx of Afib s/p AVN ablation - leadless PPM 7/2023: on coumadin    Recommendations:  Phase 2 weans: TM/PMV as tolerated.  VBG in am  May need the PMV removed at times as she does have some intermittent stridor suspect d/t trach size   Earliest first trach change 12/23, so likely will plan for early next week  VFSS tomorrow   Nebulized albuterol TID, mucomyst & metaneb BID   Torsemide 40 daily(increased 12/19)  VAP ppx: chlorhexidine BID, HOB 30 degrees  GI ppx: famotidine   DVT ppx: lovenox subcutaneous and coumadin   Routine trach care per protocol   PT/OT/SLP     Subjective:   She is feeling good. She is excited for trach change.    Tracheal secretions:  Overnight -  2x white yellow thick    Cough strength: strong    Ventilator weaning results  12/18: TM for 3.5hr.  Break on vent.  Then TM again for 7hr.  PMV for 30 min   12/19: TM/PMV for 13.5hr  "  12/20: off vent overnight on PMV, VBG  7.42, 55  12/21: PMV off vent continuously, trach change 6 bivona     Clinical status discussed today with respiratory therapist       Medications:      dextrose      - MEDICATION INSTRUCTIONS -        acetylcysteine  2 mL Nebulization BID    albuterol  2.5 mg Nebulization 3 times daily    aspirin  81 mg Oral or Feeding Tube Daily    chlorhexidine  15 mL Mouth/Throat Q12H    enoxaparin ANTICOAGULANT  40 mg Subcutaneous Q24H    escitalopram  10 mg Oral or Feeding Tube Daily    famotidine  20 mg Oral or Feeding Tube Daily    insulin aspart  1-6 Units Subcutaneous Q6H    loperamide  2 mg Per Feeding Tube BID 09 12    Melatonin  5 mg Per Feeding Tube QPM    miconazole   Topical BID    mirtazapine  15 mg Oral or Feeding Tube At Bedtime    multivitamins w/minerals  15 mL Per Feeding Tube Daily    protein modular  1 packet Per Feeding Tube Daily    saccharomyces boulardii  250 mg Oral or Feeding Tube BID    sodium chloride (PF)  10 mL Intracatheter Q8H    torsemide  40 mg Per Feeding Tube Daily    Warfarin Therapy Reminder  1 each Oral See Admin Instructions         Exam/Data:   Vitals  BP (!) 145/63 (BP Location: Left arm)   Pulse 63   Temp 98.1  F (36.7  C) (Oral)   Resp 24   Ht 1.613 m (5' 3.5\")   Wt 76.5 kg (168 lb 10.4 oz)   SpO2 97%   BMI 29.41 kg/m       I/O last 3 completed shifts:  In: 2083 [I.V.:40; NG/GT:2043]  Out: 350 [Urine:350]  Weight change: 0.468 kg (1 lb 0.5 oz)    Vent Mode: Trach collar  FiO2 (%): 28 %  Resp: 24      EXAM:  Gen: NAD sitting up in bed, TM/PMV  HEENT: NG tube in place, trach midline/intact   CV: RRR, no m/g/r  Resp: upper airway congestion, diminished at bases b/l; non-labored, no wheeze   Abd: soft, nontender  Skin: no rashes or lesions on visible skin  Ext: 1+ b/l lower extremity edema  Neuro: alert, follows commands     Labs:  Arterial Blood Gases   No lab results found in last 7 days.    Complete Blood Count   Recent Labs   Lab " 12/22/23  0528 12/21/23  0542 12/21/23  0534 12/20/23  0613 12/19/23  0614   WBC 6.3  --  5.8 5.7 5.8   HGB 8.2* 8.0* 7.6* 7.8* 7.4*     --  245 242 244     Basic Metabolic Panel  Recent Labs   Lab 12/22/23  0528 12/22/23  0527 12/21/23  2343 12/21/23  1852 12/21/23  0546 12/21/23  0542 12/21/23  0534 12/20/23  0620 12/20/23  0613 12/19/23  1136 12/19/23  0614     --   --   --   --  139 141  --  141  --  142   POTASSIUM 4.2  --   --   --   --  4.0 4.2  --  4.1  --  4.7   CHLORIDE 97*  --   --   --   --   --  99  --  101  --  103   CO2 34*  --   --   --   --   --  33*  --  33*  --  34*   BUN 27.4*  --   --   --   --   --  30.6*  --  30.9*  --  27.3*   CR 0.81  --   --   --   --   --  0.82  --  0.84  --  0.76   * 117* 122* 98   < > 117 114*   < > 114*   < > 125*    < > = values in this interval not displayed.     Liver Function Tests  Recent Labs   Lab 12/22/23 0528 12/21/23  0534 12/20/23  0613 12/19/23  0614   AST 25 23 26 29   ALT 18 20 22 21   ALKPHOS 153* 144 144 131   BILITOTAL 0.7 0.6 0.6 0.5   ALBUMIN 3.1* 3.0* 2.9* 2.8*   INR 1.73* 1.53* 1.36* 1.30*     Coagulation Profile  Recent Labs   Lab 12/22/23  0528 12/21/23  0534 12/20/23  0613 12/19/23  0614   INR 1.73* 1.53* 1.36* 1.30*       Radiology: Personally reviewed; radiology read below    XR CHEST 12/19/2023  Poststernotomy changes with a prosthetic TVR and tracheostomy tube tube in place. Feeding tube can be seen coursing into the stomach. Right upper extremity PICC line catheter overlies the proximal right atrium. Elevation of the right hemidiaphragm, unchanged with small right effusion and right basilar and likely middle lobe opacities, presumed atelectasis. Left lung is clear. Heart is slightly enlarged. No signs of failure.

## 2023-12-22 NOTE — PROGRESS NOTES
RT PROGRESS NOTE     DATA:     CURRENT SETTINGS:             TRACH TYPE / SIZE:  #6 Bivona (placed 12/22)             MODE:   TM/PMV             FIO2:   28%/30L     ACTION:             THERAPIES:   Albuterol TID, Mucomyst/Metaneb BID             SUCTION:                           FREQUENCY:   x2                        AMOUNT:   Small to Moderate                        CONSISTENCY:   Thick                        COLOR:   Pale Yellow             SPONTANEOUS COUGH EFFORT/STRENGTH OF EFFORT (not elicited by suctioning): Moderate Spontaneous Cough                           WEANING PHASE:   Phase 2                        WEAN MODE:    28%/30L TM/PMV                        WEAN TIME:   Continuous                        END WEAN REASON:   NA     RESPONSE:             BS:   Clear/Diminished             VITAL SIGNS:   Sating 97-99%, HR 61-70, RR 22-24             EMOTIONAL NEEDS / CONCERNS:  NA                RISK FOR SELF DECANNULATION:  No     NOTE / PLAN:   Downsized to #6 Bivona today with no issues.  No stridor or back pressure noticed after trach change, will monitor.  RT will continue to monitor.

## 2023-12-22 NOTE — PLAN OF CARE
Problem: Artificial Airway  Goal: Effective Communication  Outcome: Progressing  Goal: Optimal Device Function  Outcome: Progressing  Intervention: Optimize Device Care and Function  Recent Flowsheet Documentation  Taken 12/22/2023 1101 by Radha Goodrich, RT  Airway Safety Measures: all equipment/monitors on and audible  Taken 12/22/2023 0728 by Radha Goodrich, RT  Airway Safety Measures: all equipment/monitors on and audible  Goal: Absence of Device-Related Skin or Tissue Injury  Outcome: Progressing

## 2023-12-22 NOTE — PLAN OF CARE
Problem: Adult Inpatient Plan of Care  Goal: Optimal Comfort and Wellbeing  Intervention: Provide Person-Centered Care  Recent Flowsheet Documentation  Taken 12/22/2023 1545 by Yaneth Smalls RN  Trust Relationship/Rapport: care explained     Problem: Enteral Nutrition  Goal: Absence of Aspiration Signs and Symptoms  Intervention: Minimize Aspiration Risk  Recent Flowsheet Documentation  Taken 12/22/2023 1545 by Yaneth Smalls RN  Enteral Feeding Safety: tubing labeled as enteral feeding     Problem: Anxiety Signs/Symptoms  Goal: Improved Mood Symptoms (Anxiety Signs/Symptoms)  Intervention: Optimize Emotion and Mood  Recent Flowsheet Documentation  Taken 12/22/2023 1545 by Yaneth Smalls RN  Supportive Measures: active listening utilized   Goal Outcome Evaluation:    Patient is alert and oriented. Presented as calm and cooperative. Vital signs stable. Denied pain. Held tube feeding one hour before and after warfarin was given. Temp: 98.4  F (36.9  C) Temp src: Oral BP: 128/60 Pulse: 69   Resp: 22 SpO2: 99 % O2 Device: Trach dome Oxygen Delivery: 30 LPM     Yaneth Smalls RN

## 2023-12-22 NOTE — PROGRESS NOTES
[facility]    Medicine Progress Note - Hospitalist Service    Date of Admission:  12/15/2023    Brief summary:  rPiya Funez is a 83 year old female with a PMHx of HFpEF, severe TR, permanent AF s/p AVN ablation with PPM implant 1/11/19 s/p extraction TV (transvenous) PPM and implant leadless PPM 7/31/23, emergent aortic dissection repair 1/4/19, HTN, CKD, NEDA, and obesity who presented to [facility] as an outpatient for elective TVR done on 11/28/2023.  Immediate postoperative course was complicated by acute hypoxic and hypercapnic respiratory failure requiring BiPAP and probable pneumonia and treated with 7 days of IV Zosyn, patient was transferred to surgical telemetry floor on 12/2/2020.  On 12/6/2023 she was transferred back to CVICU after RRT for acute hypoxic respiratory distress and subsequent respiratory arrest with short CPR, re-intubation, and ROSC achieved. She sustained mildly displaced and nondisplaced right 3-5 rib fractures with associated right chest wall hematoma and nondisplaced anterolateral left 4th and 5th rib fractures. She was treated with meropenem 7 day course. She also has hx of recent C diff and multiple loose stools, for which she completed a 7 day po vancomycin course (per antimicrobial stewardship team, more likely to be colonization with recent history and no apparent treatment). Patient was transiently hyperglycemic and treated with insulin infusion then transitioned to sliding scale insulin per protocol. Blood sugars remained stable, has not required insulin coverage.  She remained intubated in the CVICU until undergoing tracheostomy by Dr. Pitts on 12/13/23, doing well with trach dome and PS currently.  She was treated for volume overload with diuretics (Bumex and chlorothiazide) with subsequent hypernatremia and hypotension requiring FWF and pressor support.  Spironolactone, torsemide and metoprolol discontinued and midodrine started with now stable blood pressures. Pre-op weight 70.9 kg,  discharge weight 74.1 kg (163 lbs) on 12/15/2023.  Patient was discharged to LTACH 12/15/2023 for continuing vent weaning, therapies     LTACH course:  12/17: Vitals stable.  Scheduled midodrine discontinued with BP stable in the 140s.  Diuretic therapy with torsemide restarted given peripheral edema and volume status, however at lower dose of 10 mg daily (home dose 40 mg daily) titrate slowly based on BP response.  Resume metoprolol for A-fib rate control as well gradually.  Labs reviewed, stable electrolytes, creatinine 0.82. ProBNP 3710, down from 17717 and 62161.  Hgb 8.0 from 7.6.  INR 0.98 from 1.12.  Continue heparin and Coumadin together for now until INR therapeutic.  Started hydroxyzine for anxiety 12/17.  A.m. labs ordered.      12/18-12/21:  increased torsemide to 20 mg po daily due to peripheral edema on 12/18, having loose stools,  changed tube feeds to higher fiber formula and add imodium daily and prn.  Still with edema,  increase toresemide to 40 mg po daily on 12/19 and increased imodium to bid and prn, changed heparin to lovenox for pt comfort.  She is getting prn hydroxyzine for anxiety.    Assessment & Plan      #Acute respiratory failure and arrest on 12/6/23   #Acute hypercapnic and hypoxic respiratory failure requiring BiPAP  # s/p tracheostomy 12/13/23  # s/p Mechanical ventilation  #Aspiration pneumonitis vs pneumonia  #Possible aspiration event 12/1  #Near complete plugging of left lower lobe on CT   #Acute mildly displaced and nondisplaced right 3-5 rib fractures with associated right chest wall hematoma and nondisplaced anterolateral left fourth and fifth rib fractures  -Admit to LTACH 12/15/2023 to continue vent weaning, therapies  -Pulmonology consult for vent weaning process  -RT consult for tracheostomy cares, oxygenation, nebs, pulmonary therapies  - Tracheostomy sutures to be removed POD#5 (12/18/2023)  - Mucomyst, duoneb w/ RT Q4H   - Trach dome as much as tolerated   - Continue  therapy with RT   - SLP speaking valve as tolerated      # S/p respiratory arrest 12/6  # Hx of severe TR s/p TVR  # HFpEF  # Hx of Afib s/p AVN ablation - PPM, upgraded to leadless 7/2023  # Hypertension  # Hyperlipidemia  # Hx of aortic dissection repair 2019  # Respiratory arrest 12/6   # Elevated BNP, improving   - Stable paced rhythm with underlying atrial flutter. On review of cardiology, underlying aflutter/afib has been present since at least June of this year. Device RN adjusted PPM to PTA settings rate 60 at [facility].   - Pre-op 9/8/23 echo: LVEF 55/60%, mildly dilated RV, normal function  - Echo 12/4 with LVEF 60-65%, normal RV function, TV mean gradient 5 mmHg, no TR  - Echo 12/7 with no significant change from previous with the exception of mild reduction of RV. RV free wall dyskinesia.   - ASA 81 mg daily  -Hold scheduled midodrine 10 mg Q8H since BP now in the 140s systolic and stable on 12/16  -Continue midodrine 5 mg TID PRN for SBP<100   - Held PTA spironolactone, torsemide, metoprolol due to hypotension at prior hospital  - FLUID STATUS: Pre-op weight 70.9 kg, discharge weight 74.1 kg (163 lbs) on 12/15/2023 and 165 lbs on 12/16  -increased torsemide to 40 mg po daily on 12/19  -proBNP trend appears improved from a peak of 25535 to now 3710.  - Started Warfarin 12/15.  Pharmacy consulted for Coumadin dosing (INR goal 2-3)   -Continue lovenox (DVT prophylaxis dose) and Coumadin together for now until INR therapeutic     # Multiple loose stools   # Hx of C. Difficile (9/2023)  # Severe malnutrition in the context of acute illness  - Nutrition consulted for continuing tube feedings- will change to higher fiber tube feeds - on 12/18  - NJT and enteral feeds at goal (45 mL/hr)   - SLP: NPO  - PPI famotidine  - Bowel regimen discontinued due to continued loose stools  - Finished vancomycin po 7 day course   - Continue Banatrol  - PRN Zofran  4 mg PO or IV Q6H  - PRN Compazine 5 mg IV Q6H   - PRN Imodium QID       #Hypernatremia resolved  #Hypokalemia, resolved   #CKD stage 3  #Lactic acidosis, resolved  #IMAN on CKD, resolved   #edema  Baseline creat ~1.2-1.3   FLUID STATUS: Pre-op weight 70.9 kg, discharge weight 74.1 kg (163 lbs).   - Held PTA spironolactone 25 mg daily  - Held PTA torsemide   - Elevated sodium, Na 151 --> 147 on recheck, likely 2/2 aggressive diureses S/p bumex and chlorotiazide prior to transfer  -On free water flushes 200 mL every 4 hourly on 12/16. Fluid up and normal NA Will decrease to 30ml q6h  and switch her to bumex  - I/O has been inaccurate due to unmeasured stools and voids.  - Replete lytes per protocol  - Avoid/limit nephrotoxins as able  - Strict I/O, daily weights, avoid/limit nephrotoxins  -recheck renal function in am     #Stress induced hyperglycemia  #T2DM  Hgb A1c 5.7%  - Medium Sliding scale insulin   - PTA Jardiance on hold, resume once medically optimized   - Goal BG <180 for optimal healing     # Stress induced leukocytosis  # Probable aspiration pneumonitis vs pneumonia, resolved  - 11/30 Zosyn 7-day course for pneumonia (finished)   - 12/6 Given further decompensation, Vanco and meropenem was started on 12/6   - Finished meropenem 7 day course (12/12)   - Finished Vancomycin PO 7 day course (for c-diff) (12/13)  - Blood culture with NGTD   - Urine with Candida and sputum with Yeast, likely both colonizations.   - Urine culture with NGTD     #Hx of recent C. Difficile  - C. Difficile toxin PCR positive 12/3 with negative antigen and negative toxin - per antimicrobial stewardship team, more likely to be colonization with recent history and no apparent treatment  - Finished oral vancomycin course on 12/13  - Monitor fever curve, WBC, and inflammatory markers as appropriate  - Enteric precautions per infection prevention protocol      # 3rd, 4th right rib fractures due to CPR  -Continue mini thoracotomy precautions  -Judicious use of narcotics, de-escalate as able    # Acute  "blood loss anemia  - Hgb has been stable.   -Hgb 7.6 on 12/16 and 8.0 on 12/17/2023  -Iron studies: Serum iron 29, iron binding capacity 216, saturation index 13%.  -Follow CBC with weekly labs.   -Transfuse if Hgb<7    #Acute postoperative pain  - PRN: Tylenol, oxycodone    #Hx of general anxiety disorder  - PTA Lexapro and Remeron  - Scheduled Melatonin HS     # Perineal irritation    - WOC consulted        Diet: Adult Formula Drip Feeding: Continuous Jevity 1.5; Nasojejunal; Goal Rate: 50; mL/hr; TF to run x22 hours holding 1 hr before/after warfarin  NPO for Medical/Clinical Reasons Except for: Ice Chips, NPO but receiving Tube Feeding    DVT Prophylaxis: Heparin SQ and Warfarin  Barrientos Catheter: Not present  Lines: PRESENT      PICC 12/07/23 Triple Lumen Right Basilic ok to use PICC-Site Assessment: WDL      Cardiac Monitoring: None  Code Status: Full Code      Clinically Significant Risk Factors          # Hypocalcemia: Lowest iCa = 1.19 mg/dL in last 2 days, will monitor and replace as appropriate     # Hypoalbuminemia: Lowest albumin = 2.7 g/dL at 12/18/2023  5:17 AM, will monitor as appropriate     # Hypertension: Noted on problem list    # Chronic heart failure with preserved ejection fraction: heart failure noted on problem list and last echo with EF >50%       # Overweight: Estimated body mass index is 29.41 kg/m  as calculated from the following:    Height as of this encounter: 1.613 m (5' 3.5\").    Weight as of this encounter: 76.5 kg (168 lb 10.4 oz).   # Severe Malnutrition: based on nutrition assessment      # Financial/Environmental Concerns: none   # Pacemaker present  # History of CABG: noted on surgical history       Disposition Plan     Expected Discharge Date: 01/02/2024      Destination: home with family;foster/protective services              Mychal Martinez MD  Hospitalist Service  LTACH  Securely message with NextPage (more info)  Text page via [facility] Paging/Directory "   ______________________________________________________________________    Interval History     No chest pain. SOB with activity, tolerating being off the vent on TM      Intake/Output Summary (Last 24 hours) at 12/22/2023 1114  Last data filed at 12/22/2023 1000  Gross per 24 hour   Intake 1913 ml   Output 350 ml   Net 1563 ml       Physical Exam   Vital Signs: Temp: 98.1  F (36.7  C) Temp src: Oral BP: (!) 145/63 Pulse: 63   Resp: 24 SpO2: 97 % O2 Device: Trach dome Oxygen Delivery: 30 LPM  Weight: 168 lbs 10.43 oz  GENERAL: Alert, oriented, conversant, in no distress.   EYES: Normal conjunctiva. Sclera anicteric. Nonystagmus. Extraocular movements intact.   NECK: Supple, Tracheostomy in place  NOSE: NJ tube in place  LUNGS: Clear to auscultation. Mild bilateral wheezing and some crackles post  HEART: S1 S2, Rate and rhythm is regular. No murmurs  ABDOMEN: Soft, nontender, no distension. Bowel sounds are positive. No guarding or rebound.   EXTREMITIES: 3+ pitting edema  SKIN: No rash or ulcers.   NEUROLOGIC: Alert and oriented x3. can speak with PMV Clear mentation.grossly intact  PSYCHIATRIC: Normal affect and cognition       Medical Decision Making       40 MINUTES SPENT BY ME on the date of service doing chart review, history, exam, documentation & further activities per the note.      Data     I have personally reviewed the following data over the past 24 hrs:    6.3  \   8.2 (L)   / 255     139 97 (L) 27.4 (H) /  109 (H)   4.2 34 (H) 0.81 \     ALT: 18 AST: 25 AP: 153 (H) TBILI: 0.7   ALB: 3.1 (L) TOT PROTEIN: 6.1 (L) LIPASE: N/A     INR:  1.73 (H) PTT:  N/A   D-dimer:  N/A Fibrinogen:  N/A       Imaging results reviewed over the past 24 hrs:   Recent Results (from the past 24 hour(s))   XR Video Swallow with SLP or OT    Narrative    EXAM: XR VIDEO SWALLOW WITH SLP OR OT  LOCATION: [facility]  DATE: 12/21/2023    INDICATION: Difficulty swallowing.  COMPARISON:  03/20/2019    TECHNIQUE: Routine swallow study with speech pathology using multiple barium thicknesses.    FINDINGS:   FLUOROSCOPIC TIME: 2 minutes  NUMBER OF IMAGES: 25    Swallow study with Speech Pathology using multiple barium thicknesses.     Adequate oral phase.    Inconsistent delay in swallow reflex with pour over past the epiglottis to the pyriform sinuses during swallowing of mildly thick liquid and to the pyriform sinuses during swallowing of thin liquid. No delay in initiation of swallow reflex during   swallowing of moderately thick liquid, puree or crunchy solid consistencies. Complete epiglottic inversion was demonstrated.    Deep laryngeal penetration occurred during swallowing of thin liquid. Shallow laryngeal penetration occurred during swallowing of mildly thick liquid. Shallow laryngeal penetration occurred after swallowing of moderately thick liquid. No laryngeal   penetration or aspiration of puree or crunchy solid consistency.    Mild stasis after swallowing of moderately thick liquid and puree consistency.      Impression    IMPRESSION:  1.  Adequate oral phase.  2.  Inconsistent delay in swallow reflex with complete epiglottic inversion.  3.  Deep laryngeal penetration of thin liquid.  4.  Shallow laryngeal penetration of mildly thick and moderately thick liquids.  5.  Mild stasis after swallowing of moderately thick liquid and puree consistency.    Please refer to speech therapy dysphasia evaluation report for additional information.    Examination performed by Reva May, RPA/RRA, RT (R) under the general supervision of Dr. Hoyt.           Recent Labs   Lab 12/22/23  0528 12/22/23  0527 12/21/23  2343 12/21/23  0546 12/21/23  0542 12/21/23  0534 12/20/23  0620 12/20/23  0613   WBC 6.3  --   --   --   --  5.8  --  5.7   HGB 8.2*  --   --   --  8.0* 7.6*  --  7.8*   MCV 96  --   --   --   --  96  --  97     --   --   --   --  245  --  242   INR 1.73*  --   --   --   --  1.53*   --  1.36*     --   --   --  139 141  --  141   POTASSIUM 4.2  --   --   --  4.0 4.2  --  4.1   CHLORIDE 97*  --   --   --   --  99  --  101   CO2 34*  --   --   --   --  33*  --  33*   BUN 27.4*  --   --   --   --  30.6*  --  30.9*   CR 0.81  --   --   --   --  0.82  --  0.84   ANIONGAP 8  --   --   --   --  9  --  7   BESS 9.0  --   --   --   --  8.6*  --  8.7*   * 117* 122*   < > 117 114*   < > 114*   ALBUMIN 3.1*  --   --   --   --  3.0*  --  2.9*   PROTTOTAL 6.1*  --   --   --   --  5.8*  --  5.7*   BILITOTAL 0.7  --   --   --   --  0.6  --  0.6   ALKPHOS 153*  --   --   --   --  144  --  144   ALT 18  --   --   --   --  20  --  22   AST 25  --   --   --   --  23  --  26    < > = values in this interval not displayed.       Most Recent 3 CBC's:  Recent Labs   Lab Test 12/22/23  0528 12/21/23  0542 12/21/23  0534 12/20/23  0613   WBC 6.3  --  5.8 5.7   HGB 8.2* 8.0* 7.6* 7.8*   MCV 96  --  96 97     --  245 242     Most Recent 3 BMP's:  Recent Labs   Lab Test 12/22/23  0528 12/22/23  0527 12/21/23  2343 12/21/23  0546 12/21/23  0542 12/21/23  0534 12/20/23  0620 12/20/23  0613     --   --   --  139 141  --  141   POTASSIUM 4.2  --   --   --  4.0 4.2  --  4.1   CHLORIDE 97*  --   --   --   --  99  --  101   CO2 34*  --   --   --   --  33*  --  33*   BUN 27.4*  --   --   --   --  30.6*  --  30.9*   CR 0.81  --   --   --   --  0.82  --  0.84   ANIONGAP 8  --   --   --   --  9  --  7   BESS 9.0  --   --   --   --  8.6*  --  8.7*   * 117* 122*   < > 117 114*   < > 114*    < > = values in this interval not displayed.     Most Recent 2 LFT's:  Recent Labs   Lab Test 12/22/23  0528 12/21/23  0534   AST 25 23   ALT 18 20   ALKPHOS 153* 144   BILITOTAL 0.7 0.6     Most Recent 3 INR's:  Recent Labs   Lab Test 12/22/23  0528 12/21/23  0534 12/20/23  0613   INR 1.73* 1.53* 1.36*     Most Recent 3 BNP's:  Recent Labs   Lab Test 12/17/23  0600 12/09/23  0338 12/07/23  0128 12/06/23  1720  08/30/23  1406 06/23/21  0926   NTBNPI 3,710* 22,193* 30,691*  --   --   --    NTBNP  --   --   --  33,124* 3,249* 3,163*     Most Recent 6 glucoses:  Recent Labs   Lab Test 12/22/23  0528 12/22/23  0527 12/21/23  2343 12/21/23  1852 12/21/23  1231 12/21/23  0546   * 117* 122* 98 124* 108*     Most Recent ABG:  Recent Labs   Lab Test 12/14/23  0745   PH 7.45   PO2 95   PCO2 44   HCO3 31*   FABIANA 6.1*     Most Recent ESR & CRP:  Recent Labs   Lab Test 12/06/23  1720   CRPI 73.40*

## 2023-12-22 NOTE — PLAN OF CARE
"  Problem: Adult Inpatient Plan of Care  Goal: Patient-Specific Goal (Individualized)  Description: You can add care plan individualizations to a care plan. Examples of Individualization might be:  \"Parent requests to be called daily at 9am for status\", \"I have a hard time hearing out of my right ear\", or \"Do not touch me to wake me up as it startles  me\".  Outcome: Progressing     Problem: Enteral Nutrition  Goal: Absence of Aspiration Signs and Symptoms  Outcome: Progressing   Goal Outcome Evaluation:       Alert and oriented X4, denies pain, Vs stable, no SOB, edema +2 BLE, tolerating TF, loose stool X2, wet pad X2, family present and pt pleasant.  Kelley Edmondson RN                   "

## 2023-12-23 NOTE — PLAN OF CARE
"  Problem: Adult Inpatient Plan of Care  Goal: Optimal Comfort and Wellbeing  Outcome: Progressing  Intervention: Provide Person-Centered Care  Recent Flowsheet Documentation  Taken 12/23/2023 0300 by Nathaniel Rico RN  Trust Relationship/Rapport: care explained   Goal Outcome Evaluation:    Sleep well most of the shift..  no c/o of pain. /58 (BP Location: Left arm)   Pulse 60   Temp 97.1  F (36.2  C) (Oral)   Resp 24   Ht 1.613 m (5' 3.5\")   Wt 76.5 kg (168 lb 10.4 oz)   SpO2 95%   BMI 29.41 kg/m        "

## 2023-12-23 NOTE — PROGRESS NOTES
[facility]    Medicine Progress Note - Hospitalist Service    Date of Admission:  12/15/2023    Brief summary:  Priya Funez is a 83 year old female with a PMHx of HFpEF, severe TR, permanent AF s/p AVN ablation with PPM implant 1/11/19 s/p extraction TV (transvenous) PPM and implant leadless PPM 7/31/23, emergent aortic dissection repair 1/4/19, HTN, CKD, NEDA, and obesity who presented to [facility] as an outpatient for elective TVR done on 11/28/2023.  Immediate postoperative course was complicated by acute hypoxic and hypercapnic respiratory failure requiring BiPAP and probable pneumonia and treated with 7 days of IV Zosyn, patient was transferred to surgical telemetry floor on 12/2/2020.  On 12/6/2023 she was transferred back to CVICU after RRT for acute hypoxic respiratory distress and subsequent respiratory arrest with short CPR, re-intubation, and ROSC achieved. She sustained mildly displaced and nondisplaced right 3-5 rib fractures with associated right chest wall hematoma and nondisplaced anterolateral left 4th and 5th rib fractures. She was treated with meropenem 7 day course. She also has hx of recent C diff and multiple loose stools, for which she completed a 7 day po vancomycin course (per antimicrobial stewardship team, more likely to be colonization with recent history and no apparent treatment). Patient was transiently hyperglycemic and treated with insulin infusion then transitioned to sliding scale insulin per protocol. Blood sugars remained stable, has not required insulin coverage.  She remained intubated in the CVICU until undergoing tracheostomy by Dr. Pitts on 12/13/23, doing well with trach dome and PS currently.  She was treated for volume overload with diuretics (Bumex and chlorothiazide) with subsequent hypernatremia and hypotension requiring FWF and pressor support.  Spironolactone, torsemide and metoprolol discontinued and midodrine started with now stable blood pressures. Pre-op weight 70.9 kg,  discharge weight 74.1 kg (163 lbs) on 12/15/2023.  Patient was discharged to LTACH 12/15/2023 for continuing vent weaning, therapies      LTACH course:  12/17: Vitals stable.  Scheduled midodrine discontinued with BP stable in the 140s.  Diuretic therapy with torsemide restarted given peripheral edema and volume status, however at lower dose of 10 mg daily (home dose 40 mg daily) titrate slowly based on BP response.  Resume metoprolol for A-fib rate control as well gradually.  Labs reviewed, stable electrolytes, creatinine 0.82. ProBNP 3710, down from 93415 and 89268.  Hgb 8.0 from 7.6.  INR 0.98 from 1.12.  Continue heparin and Coumadin together for now until INR therapeutic.  Started hydroxyzine for anxiety 12/17.  A.m. labs ordered.       12/18-12/23:  increased torsemide to 20 mg po daily due to peripheral edema on 12/18, having loose stools,  changed tube feeds to higher fiber formula and add imodium daily and prn.  Still with edema,  increase toresemide to 40 mg po daily on 12/19 and increased imodium to bid and prn, changed heparin to lovenox for pt comfort.  She is getting prn hydroxyzine for anxiety. 12/22 switched to bumex 1 mg daily and will increase to bumex 1 mg BID 12/23.    Assessment & Plan   #Acute respiratory failure and arrest on 12/6/23   #Acute hypercapnic and hypoxic respiratory failure requiring BiPAP  # s/p tracheostomy 12/13/23  # s/p Mechanical ventilation  #Aspiration pneumonitis vs pneumonia  #Possible aspiration event 12/1  #Near complete plugging of left lower lobe on CT   #Acute mildly displaced and nondisplaced right 3-5 rib fractures with associated right chest wall hematoma and nondisplaced anterolateral left fourth and fifth rib fractures  -Admit to LTACH 12/15/2023 to continue vent weaning, therapies  -Pulmonology consult for vent weaning process  -RT consult for tracheostomy cares, oxygenation, nebs, pulmonary therapies  - Tracheostomy sutures to be removed POD#5 (12/18/2023)  -  Mucomyst, duoneb w/ RT Q4H   - Trach dome as much as tolerated   - Continue therapy with RT   - SLP speaking valve as tolerated      # S/p respiratory arrest 12/6  # Hx of severe TR s/p TVR  # HFpEF  # Hx of Afib s/p AVN ablation - PPM, upgraded to leadless 7/2023  # Hypertension  # Hyperlipidemia  # Hx of aortic dissection repair 2019  # Respiratory arrest 12/6   # acute on chronic diastolic heart failure with Elevated BNP, improving   - Stable paced rhythm with underlying atrial flutter. On review of cardiology, underlying aflutter/afib has been present since at least June of this year. Device RN adjusted PPM to PTA settings rate 60 at [facility].   - Pre-op 9/8/23 echo: LVEF 55/60%, mildly dilated RV, normal function  - Echo 12/4 with LVEF 60-65%, normal RV function, TV mean gradient 5 mmHg, no TR  - Echo 12/7 with no significant change from previous with the exception of mild reduction of RV. RV free wall dyskinesia.   - ASA 81 mg daily  -Hold scheduled midodrine 10 mg Q8H since BP now in the 140s systolic and stable on 12/16  -Continue midodrine 5 mg TID PRN for SBP<100   - Held PTA spironolactone, torsemide, metoprolol due to hypotension at prior hospital  - FLUID STATUS: Pre-op weight 70.9 kg, discharge weight 74.1 kg (163 lbs) on 12/15/2023 and 165 lbs on 12/16  -increased torsemide to 40 mg po daily on 12/19 then switched to bumex 12/22 and increased to bumex 1 mg BID on 12/23  -proBNP trend appears improved from a peak of 90700 to now 3710.  - Started Warfarin 12/15.  Pharmacy consulted for Coumadin dosing (INR goal 2-3)   -Continue lovenox (DVT prophylaxis dose) and Coumadin together for now until INR therapeutic     # Multiple loose stools   # Hx of C. Difficile (9/2023)  # Severe malnutrition in the context of acute illness  - Nutrition consulted for continuing tube feedings- will change to higher fiber tube feeds - on 12/18  - NJT and enteral feeds at goal (45 mL/hr)   - SLP: NPO  - PPI famotidine  - Bowel  regimen discontinued due to continued loose stools  - Finished vancomycin po 7 day course   - Continue Banatrol  - PRN Zofran  4 mg PO or IV Q6H  - PRN Compazine 5 mg IV Q6H   - loperamide 2 mg BID and prn     #Hypernatremia resolved  #Hypokalemia, resolved   #CKD stage 3  #Lactic acidosis, resolved  #IMAN on CKD, resolved   #edema  Baseline creat ~1.2-1.3   FLUID STATUS: Pre-op weight 70.9 kg, discharge weight 74.1 kg (163 lbs).   - Held PTA spironolactone 25 mg daily  - Held PTA torsemide   - Elevated sodium, Na 151 --> 147 on recheck, likely 2/2 aggressive diureses S/p bumex and chlorotiazide prior to transfer  -On free water flushes 200 mL every 4 hourly on 12/16. Fluid up and normal Na so decreased to 30ml q6h and switched her to bumex 12/22  - I/O has been inaccurate due to unmeasured stools and voids.  - Replete lytes per protocol  - Avoid/limit nephrotoxins as able  - Strict I/O, daily weights, avoid/limit nephrotoxins     #Stress induced hyperglycemia  #T2DM  Hgb A1c 5.7%, stable off meds  - PTA Jardiance on hold, resume once medically optimized   - Goal BG <180 for optimal healing     # Stress induced leukocytosis  # Probable aspiration pneumonitis vs pneumonia, resolved  - 11/30 Zosyn 7-day course for pneumonia (finished)   - 12/6 Given further decompensation, Vanco and meropenem was started on 12/6   - Finished meropenem 7 day course (12/12)   - Finished Vancomycin PO 7 day course (for c-diff) (12/13)  - Blood culture with NGTD   - Urine with Candida and sputum with Yeast, likely both colonizations.   - Urine culture with NGTD     #Hx of recent C. Difficile  - C. Difficile toxin PCR positive 12/3 with negative antigen and negative toxin - per antimicrobial stewardship team, more likely to be colonization with recent history and no apparent treatment  - Finished oral vancomycin course on 12/13, still with diarrhea but no abd pain, leucocytosis, or fevers so monitoring for now  - Monitor fever curve, WBC,  "and inflammatory markers as appropriate  - Enteric precautions per infection prevention protocol      # 3rd, 4th right rib fractures due to CPR  -Continue mini thoracotomy precautions  -Judicious use of narcotics, de-escalate as able     # Acute blood loss anemia  - Hgb has been stable.   -Hgb 7.6 on 12/16 and 8.0 on 12/17/2023  -Iron studies: Serum iron 29, iron binding capacity 216, saturation index 13%.  -Follow CBC with weekly labs.   -Transfuse if Hgb<7     #Acute postoperative pain  - PRN: Tylenol, oxycodone     #Hx of general anxiety disorder  - PTA Lexapro and Remeron  - Scheduled Melatonin HS      # Perineal irritation    - WOC consulted          Diet: Adult Formula Drip Feeding: Continuous Jevity 1.5; Nasojejunal; Goal Rate: 50; mL/hr; TF to run x22 hours holding 1 hr before/after warfarin  NPO for Medical/Clinical Reasons Except for: Ice Chips, NPO but receiving Tube Feeding    DVT Prophylaxis: Enoxaparin (Lovenox) subcutaneous transitioning to warfarin  Barrientos Catheter: Not present  Lines: PRESENT      PICC 12/07/23 Triple Lumen Right Basilic ok to use PICC-Site Assessment: WDL      Cardiac Monitoring: None  Code Status: Full Code      Clinically Significant Risk Factors              # Hypoalbuminemia: Lowest albumin = 2.7 g/dL at 12/18/2023  5:17 AM, will monitor as appropriate     # Hypertension: Noted on problem list  # Chronic heart failure with preserved ejection fraction: heart failure noted on problem list and last echo with EF >50%       # Overweight: Estimated body mass index is 29.24 kg/m  as calculated from the following:    Height as of this encounter: 1.613 m (5' 3.5\").    Weight as of this encounter: 76.1 kg (167 lb 11.2 oz).   # Severe Malnutrition: based on nutrition assessment    # Financial/Environmental Concerns: none   # Pacemaker present  # History of CABG: noted on surgical history       Disposition Plan     Expected Discharge Date: 01/02/2024      Destination: home with " family;foster/protective services              Robert Downey MD  Hospitalist Service  LTACH  Securely message with [employer] (more info)  Text page via [facility] Paging/Directory   ______________________________________________________________________    Interval History   Having difficulty with voiding.  Not up and moving much yet, still with loose stools frequently but no abdominal pain or fevers.    Physical Exam   Vital Signs: Temp: 97.9  F (36.6  C) Temp src: Oral BP: 119/56 Pulse: 62   Resp: 18 SpO2: 97 % O2 Device: Trach dome Oxygen Delivery: 30 LPM  Weight: 167 lbs 11.2 oz    GENERAL: Alert, oriented, conversant, in no distress.   EYES: Normal conjunctiva. Sclera anicteric. Nonystagmus. Extraocular movements intact.   NECK: Supple, Tracheostomy in place  NOSE: NJ tube in place  LUNGS: Mild rhonchi bilaterally  HEART: S1 S2, Rate and rhythm is regular. No murmurs, 2+ BLE edema  ABDOMEN: Soft, nontender, no distension. Bowel sounds are positive. No guarding or rebound.   EXTREMITIES: warm and nontender  SKIN: No rash or ulcers on exposed areas.   NEUROLOGIC: Alert and oriented x3. can speak with PMV Clear mentation.grossly intact  PSYCHIATRIC: Normal affect and cognition     Medical Decision Making       50 MINUTES SPENT BY ME on the date of service doing chart review, history, exam, documentation & further activities per the note.      Data     I have personally reviewed the following data over the past 24 hrs:    5.6  \   8.0 (L)   / 221     140 97 (L) 28.8 (H) /  120 (H)   4.0 35 (H) 0.86 \     ALT: 18 AST: 23 AP: 154 (H) TBILI: 0.6   ALB: 3.2 (L) TOT PROTEIN: 6.0 (L) LIPASE: N/A     INR:  1.69 (H) PTT:  N/A   D-dimer:  N/A Fibrinogen:  N/A       Imaging results reviewed over the past 24 hrs:   No results found for this or any previous visit (from the past 24 hour(s)).

## 2023-12-23 NOTE — PLAN OF CARE
Problem: Artificial Airway  Goal: Effective Communication  Outcome: Progressing  Goal: Optimal Device Function  Outcome: Progressing  Goal: Absence of Device-Related Skin or Tissue Injury  Outcome: Progressing       RT PROGRESS NOTE     DATA:     CURRENT SETTINGS: AC/18/390/+5/30% (back up setting)             TRACH TYPE / SIZE:  #6 Bivona changed on 12/22/23             MODE:  TM/PMV             FIO2:   28% and 30 lpm     ACTION:             THERAPIES:   ALB TID/MUCOMYST BID/ MET NEB BID             SUCTION:                           FREQUENCY:   X2                        AMOUNT:   Moderate to small                        CONSISTENCY:   Thick                        COLOR:   Pale/yellow             SPONTANEOUS COUGH EFFORT/STRENGTH OF EFFORT (not elicited by suctioning): Yes:strong                              WEANING PHASE:   #2                        WEAN MODE:    28% and 30 lpm tm/pmv as marbella                        WEAN TIME:                           END WEAN REASON:        RESPONSE:             BS:   Clear/diminish             VITAL SIGNS:   SAT 95-97%, HR 60-61, RR 20-24             EMOTIONAL NEEDS / CONCERNS:  N/A                RISK FOR SELF DECANNULATION:  N/A                        RISK DUE TO:                          INTERVENTION/S IN PLACE IS/ARE:  N/A       NOTE / PLAN:   Pt is on 28% and 30 lpm tm/pmv, marbella well. Cont current therapy and keep sat >/=90%

## 2023-12-23 NOTE — PLAN OF CARE
Problem: Diarrhea  Goal: Effective Diarrhea Management  Outcome: Progressing  Intervention: Manage Diarrhea  Recent Flowsheet Documentation  Taken 12/23/2023 1225 by Kelley Edmondson RN  Isolation Precautions: enteric precautions maintained  Perineal Care:   perineum cleansed   diaper changed   Goal Outcome Evaluation:       Alert and oriented X4, complained of pain gave PRN Tylenol,  Vs stable, no SOB, edema +2 BLE, tolerating TF, loose stool X2, wet pad X1, used commode X1,  family present and pt pleasant. Mg, K, Phosphorus protocol done.  Kelley Edmondson, RN

## 2023-12-24 NOTE — PLAN OF CARE
Problem: Artificial Airway  Goal: Effective Communication  Outcome: Progressing  Goal: Optimal Device Function  Outcome: Progressing  Intervention: Optimize Device Care and Function  Recent Flowsheet Documentation  Taken 12/24/2023 1322 by Allison Casarez, RT  Airway Safety Measures: all equipment/monitors on and audible  Taken 12/24/2023 1203 by Allison Casarez, RT  Airway Safety Measures: all equipment/monitors on and audible  Taken 12/24/2023 0720 by Allison Casarez RT  Airway Safety Measures: all equipment/monitors on and audible  Goal: Absence of Device-Related Skin or Tissue Injury  Outcome: Progressing   Goal Outcome Evaluation:       RT PROGRESS NOTE   [id number]  DATA:     CURRENT SETTINGS:             TRACH TYPE / SIZE: #6 Bivona downsized from #6 Shiley on 12/22/23             MODE: TM 30% 30L             FIO2:        ACTION:             THERAPIES: Albuterol TID/Mucomyst neb BID, Metaneb BID             SUCTION:                           FREQUENCY:  X2 for small white to p-yellow thickish secr.                         AMOUNT:                           CONSISTENCY:                           COLOR:                SPONTANEOUS COUGH EFFORT/STRENGTH OF EFFORT (not elicited by suctioning):                               WEANING PHASE: 2                          WEAN MODE:  TM  cont since 12/20/23, marbella well. PMV cont since downsizing on 12/22                           WEAN TIME:                           END WEAN REASON:      RESPONSE:             BS:                VITAL SIGNS:                EMOTIONAL NEEDS / CONCERNS:                  RISK FOR SELF DECANNULATION:                          RISK DUE TO:                          INTERVENTION/S IN PLACE IS/ARE:         NOTE / PLAN:   Goal Outcome Evaluation:     Cont with phase 2     Cont to monitor and treat

## 2023-12-24 NOTE — PLAN OF CARE
Problem: Artificial Airway  Goal: Effective Communication  Outcome: Progressing  Goal: Optimal Device Function  Outcome: Progressing  Goal: Absence of Device-Related Skin or Tissue Injury  Outcome: Progressing       RT PROGRESS NOTE     DATA:     CURRENT SETTINGS: AC/18/390/+5/30% (back up setting)             TRACH TYPE / SIZE:  #6 Bivona changed on 12/22/23             MODE:  TM/PMV             FIO2:   28% and 30 lpm     ACTION:             THERAPIES:   ALB TID/MUCOMYST BID/ MET NEB BID             SUCTION:                           FREQUENCY:   X2                        AMOUNT:   Small to moderate                        CONSISTENCY:   Thick                        COLOR:   Pale/yellow             SPONTANEOUS COUGH EFFORT/STRENGTH OF EFFORT (not elicited by suctioning): Yes:strong                              WEANING PHASE:   #2                        WEAN MODE:    28% and 30 lpm tm/pmv as marbella                        WEAN TIME:                           END WEAN REASON:        RESPONSE:             BS:   Clear/diminish             VITAL SIGNS:   SAT 95-99%, HR 60-62, RR 20-22             EMOTIONAL NEEDS / CONCERNS:  N/A                RISK FOR SELF DECANNULATION:  N/A                        RISK DUE TO:                          INTERVENTION/S IN PLACE IS/ARE:  N/A       NOTE / PLAN:   Pt is on 28% and 30 lpm tm/pmv, marbella well. Cont current therapy and keep sat >/=90%

## 2023-12-24 NOTE — PROGRESS NOTES
[facility]    Medicine Progress Note - Hospitalist Service    Date of Admission:  12/15/2023    Brief summary:  Priya Funez is a 83 year old female with a PMHx of HFpEF, severe TR, permanent AF s/p AVN ablation with PPM implant 1/11/19 s/p extraction TV (transvenous) PPM and implant leadless PPM 7/31/23, emergent aortic dissection repair 1/4/19, HTN, CKD, NEDA, and obesity who presented to [facility] as an outpatient for elective TVR done on 11/28/2023.  Immediate postoperative course was complicated by acute hypoxic and hypercapnic respiratory failure requiring BiPAP and probable pneumonia and treated with 7 days of IV Zosyn, patient was transferred to surgical telemetry floor on 12/2/2020.  On 12/6/2023 she was transferred back to CVICU after RRT for acute hypoxic respiratory distress and subsequent respiratory arrest with short CPR, re-intubation, and ROSC achieved. She sustained mildly displaced and nondisplaced right 3-5 rib fractures with associated right chest wall hematoma and nondisplaced anterolateral left 4th and 5th rib fractures. She was treated with meropenem 7 day course. She also has hx of recent C diff and multiple loose stools, for which she completed a 7 day po vancomycin course (per antimicrobial stewardship team, more likely to be colonization with recent history and no apparent treatment). Patient was transiently hyperglycemic and treated with insulin infusion then transitioned to sliding scale insulin per protocol. Blood sugars remained stable, has not required insulin coverage.  She remained intubated in the CVICU until undergoing tracheostomy by Dr. Pitts on 12/13/23, doing well with trach dome and PS currently.  She was treated for volume overload with diuretics (Bumex and chlorothiazide) with subsequent hypernatremia and hypotension requiring FWF and pressor support.  Spironolactone, torsemide and metoprolol discontinued and midodrine started with now stable blood pressures. Pre-op weight 70.9 kg,  discharge weight 74.1 kg (163 lbs) on 12/15/2023.  Patient was discharged to LTACH 12/15/2023 for continuing vent weaning, therapies      LTACH course:  12/17: Vitals stable.  Scheduled midodrine discontinued with BP stable in the 140s.  Diuretic therapy with torsemide restarted given peripheral edema and volume status, however at lower dose of 10 mg daily (home dose 40 mg daily) titrate slowly based on BP response.  Resume metoprolol for A-fib rate control as well gradually.  Labs reviewed, stable electrolytes, creatinine 0.82. ProBNP 3710, down from 13855 and 42904.  Hgb 8.0 from 7.6.  INR 0.98 from 1.12.  Continue heparin and Coumadin together for now until INR therapeutic.  Started hydroxyzine for anxiety 12/17.  A.m. labs ordered.       12/18-12/24:  increased torsemide to 20 mg po daily due to peripheral edema on 12/18, having loose stools,  changed tube feeds to higher fiber formula and add imodium daily and prn.  Still with edema,  increase toresemide to 40 mg po daily on 12/19 and increased imodium to bid and prn, changed heparin to lovenox for pt comfort.  She is getting prn hydroxyzine for anxiety which we should try to titrate off as anticoholinergics are not optimal in the geriatric population. 12/22 switched to bumex 1 mg daily and increased to bumex 1 mg BID 12/23.  Encouraged leg elevation above heart. Give hydrochlorothiazide one dose 12/24 - if that doesn't help then would need to try IV diuretics.  INR above 2 for the first time on 12/24 so if above 2 on 12/25 then could stop the lovenox.    Assessment & Plan     #Acute respiratory failure and arrest on 12/6/23   #Acute hypercapnic and hypoxic respiratory failure requiring BiPAP  # s/p tracheostomy 12/13/23  # s/p Mechanical ventilation  #Aspiration pneumonitis vs pneumonia  #Possible aspiration event 12/1  #Near complete plugging of left lower lobe on CT   #Acute mildly displaced and nondisplaced right 3-5 rib fractures with associated right  chest wall hematoma and nondisplaced anterolateral left fourth and fifth rib fractures  -Admit to LTACH 12/15/2023 to continue vent weaning, therapies  -Pulmonology consult for vent weaning process  -RT consult for tracheostomy cares, oxygenation, nebs, pulmonary therapies  - Tracheostomy sutures to be removed POD#5 (12/18/2023)  - Mucomyst, duoneb w/ RT Q4H   - Trach dome as much as tolerated   - Continue therapy with RT   - SLP speaking valve as tolerated      # S/p respiratory arrest 12/6  # Hx of severe TR s/p TVR  # HFpEF  # Hx of Afib s/p AVN ablation - PPM, upgraded to leadless 7/2023  # Hypertension  # Hyperlipidemia  # Hx of aortic dissection repair 2019  # Respiratory arrest 12/6   - Stable paced rhythm with underlying atrial flutter. On review of cardiology, underlying aflutter/afib has been present since at least June of this year. Device RN adjusted PPM to PTA settings rate 60 at [facility].   - Pre-op 9/8/23 echo: LVEF 55/60%, mildly dilated RV, normal function  - Echo 12/4 with LVEF 60-65%, normal RV function, TV mean gradient 5 mmHg, no TR  - Echo 12/7 with no significant change from previous with the exception of mild reduction of RV. RV free wall dyskinesia.   - ASA 81 mg daily  -was on scheduled midodrine 10 mg Q8H but stopped that 12/16/23 since BP in the 140s systolic and stable  -Continue midodrine 5 mg TID PRN for SBP<100   - Started Warfarin 12/15.  Pharmacy consulted for Coumadin dosing (INR goal 2-3)   -Continue lovenox (DVT prophylaxis dose) and Coumadin together for now until INR therapeutic - INR 2.04 12/24 so if stays therapeutic on 12/25 then could stop the lovenox    # acute on chronic diastolic heart failure with Elevated BNP, improving   - Held PTA spironolactone, torsemide, metoprolol due to hypotension at prior hospital  - Echo 12/4 with LVEF 60-65%, normal RV function, TV mean gradient 5 mmHg, no TR  - Echo 12/7 with no significant change from previous with the exception of mild  reduction of RV. RV free wall dyskinesia.   - FLUID STATUS: Pre-op weight 70.9 kg, discharge weight 74.1 kg (163 lbs) on 12/15/2023 and 165 lbs on 12/16  -increased torsemide to 40 mg po daily on 12/19 then switched to bumex 12/22 and increased to bumex 1 mg BID on 12/23  -proBNP trend appears improved from a peak of 81252 to now 3710.  -has a new blister on her right foot likely due to fluid retention  - will give one dose of hydrochlorothiazide 12/24/23 to see if that will help augment the loop diuretic, if this doesn't help then may need to try IV diuretics - I think now that she is further out from the surgery and acute events her BP has improved she will be more likely to tolerate IV diuresis.    Bullous/blister on top of Right foot  - solitary - likely due to fluid retention but if worsens may need to consider alternative etiology.  Would not unroof, hopefully over time will resorb.  Doubt bullous pemphigoid given its a solitary lesion.    # Multiple loose stools   # Hx of C. Difficile (9/2023)  # Severe malnutrition in the context of acute illness  - Nutrition consulted for continuing tube feedings- will change to higher fiber tube feeds - on 12/18  - NJT and enteral feeds at goal (45 mL/hr)   - SLP: NPO  - PPI famotidine  - Bowel regimen discontinued due to continued loose stools  - Finished vancomycin po 7 day course   - Continue Banatrol  - PRN Zofran  4 mg PO or IV Q6H  - PRN Compazine 5 mg IV Q6H   - loperamide 2 mg BID and prn     #Hypernatremia resolved  #Hypokalemia, resolved   #CKD stage 3  #Lactic acidosis, resolved  #IMAN on CKD, resolved   #edema  Baseline creat ~1.2-1.3   FLUID STATUS: Pre-op weight 70.9 kg, discharge weight 74.1 kg (163 lbs).   - Elevated sodium, Na 151 --> 147 on recheck, likely 2/2 aggressive diureses S/p bumex and chlorotiazide prior to transfer  -On free water flushes 200 mL every 4 hourly on 12/16. Fluid up and normal Na so decreased to 30ml q6h and switched her to bumex  12/22  - I/O has been inaccurate due to unmeasured stools and voids.  - Replete lytes per protocol  - Avoid/limit nephrotoxins as able  - Strict I/O, daily weights, avoid/limit nephrotoxins     #Stress induced hyperglycemia  #T2DM  Hgb A1c 5.7%, stable off meds  - PTA Jardiance on hold, resume once medically optimized   - Goal BG <180 for optimal healing     # Stress induced leukocytosis  # Probable aspiration pneumonitis vs pneumonia, resolved  - 11/30 Zosyn 7-day course for pneumonia (finished)   - 12/6 Given further decompensation, Vanco and meropenem was started on 12/6   - Finished meropenem 7 day course (12/12)   - Finished Vancomycin PO 7 day course (for c-diff) (12/13)  - Blood culture with NGTD   - Urine with Candida and sputum with Yeast, likely both colonizations.   - Urine culture with NGTD     #Hx of recent C. Difficile  - C. Difficile toxin PCR positive 12/3 with negative antigen and negative toxin - per antimicrobial stewardship team, more likely to be colonization with recent history and no apparent treatment  - Finished oral vancomycin course on 12/13, still with diarrhea but no abd pain, leucocytosis, or fevers so monitoring for now  - Monitor fever curve, WBC, and inflammatory markers as appropriate  - Enteric precautions per infection prevention protocol      # 3rd, 4th right rib fractures due to CPR  -Continue mini thoracotomy precautions  -Judicious use of narcotics, de-escalate as able     # Acute blood loss anemia  - Hgb has been stable.   -Hgb 7.6 on 12/16 and 8.0 on 12/17/2023  -Iron studies: Serum iron 29, iron binding capacity 216, saturation index 13%.  -Follow CBC with weekly labs.   -Transfuse if Hgb<7     #Acute postoperative pain  - PRN: Tylenol, oxycodone     #Hx of general anxiety disorder  - PTA Lexapro and Remeron  - Scheduled Melatonin HS      # Perineal irritation    - WOC consulted        Diet: Adult Formula Drip Feeding: Continuous Jevity 1.5; Nasojejunal; Goal Rate: 50;  "mL/hr; TF to run x22 hours holding 1 hr before/after warfarin  NPO for Medical/Clinical Reasons Except for: Ice Chips, NPO but receiving Tube Feeding    DVT Prophylaxis: Enoxaparin (Lovenox) subcutaneous transitioning to warfarin   Barrientos Catheter: Not present  Lines: PRESENT      PICC 12/07/23 Triple Lumen Right Basilic ok to use PICC-Site Assessment: WDL except      Cardiac Monitoring: None  Code Status: Full Code      Clinically Significant Risk Factors              # Hypoalbuminemia: Lowest albumin = 2.7 g/dL at 12/18/2023  5:17 AM, will monitor as appropriate     # Hypertension: Noted on problem list  # Chronic heart failure with preserved ejection fraction: heart failure noted on problem list and last echo with EF >50%       # Overweight: Estimated body mass index is 29.24 kg/m  as calculated from the following:    Height as of this encounter: 1.613 m (5' 3.5\").    Weight as of this encounter: 76.1 kg (167 lb 11.2 oz).   # Severe Malnutrition: based on nutrition assessment    # Financial/Environmental Concerns: none   # Pacemaker present  # History of CABG: noted on surgical history       Disposition Plan     Expected Discharge Date: 01/02/2024      Destination: home with family;foster/protective services              Robert Downey MD  Hospitalist Service  LTACH  Securely message with [employer] (more info)  Text page via [employer] Paging/Directory   ______________________________________________________________________    Interval History   Feels ok, thinks she can elevate her legs some today  New blister on top of right foot - not painful    Physical Exam   Vital Signs: Temp: 97.1  F (36.2  C) Temp src: Oral BP: 132/64 Pulse: 60   Resp: 28 SpO2: 98 % O2 Device: Trach dome Oxygen Delivery: 30 LPM  Weight: 167 lbs 11.2 oz    GENERAL: Alert, oriented, conversant, in no distress.   EYES: Normal conjunctiva. Sclera anicteric. Nonystagmus. Extraocular movements intact.   NECK: Supple, Tracheostomy in place  NOSE: NJ tube " in place  LUNGS: Mild rhonchi bilaterally  HEART: S1 S2, Rate and rhythm is regular. No murmurs, 2+ BLE edema  ABDOMEN: Soft, nontender, no distension. Bowel sounds are positive. No guarding or rebound.   EXTREMITIES: warm and nontender  SKIN: large 3-4 cm fluid filled sac on dorsum right foot, no erythema or drainage   NEUROLOGIC: Alert and oriented x3. can speak with PMV Clear mentation.grossly intact  PSYCHIATRIC: Normal affect and cognition     Medical Decision Making       52 MINUTES SPENT BY ME on the date of service doing chart review, history, exam, documentation & further activities per the note.      Data     I have personally reviewed the following data over the past 24 hrs:    N/A  \   N/A   / N/A     N/A N/A N/A /  120 (H)   4.2 N/A N/A \     INR:  2.04 (H) PTT:  N/A   D-dimer:  N/A Fibrinogen:  N/A       Imaging results reviewed over the past 24 hrs:   No results found for this or any previous visit (from the past 24 hour(s)).

## 2023-12-24 NOTE — PLAN OF CARE
Goal Outcome Evaluation:       Patient alert and oriented x 4. Pt prefer to stay in recliner until 2200. Edema noted on bilateral extremities. Encouraged to elevate legs . Pt elevated her legs on and off .PRN tylenol given for generalized pain 5/10. Simethicone also given for gas. Tolerated TF well. Pt used bedside commode. Had loose stool x 2. Incontinent of urine and voided in the commode . Vitals stable.  Problem: Adult Inpatient Plan of Care  Goal: Absence of Hospital-Acquired Illness or Injury  Intervention: Identify and Manage Fall Risk  Recent Flowsheet Documentation  Taken 12/23/2023 1600 by Nicolasa Courtney RN  Safety Promotion/Fall Prevention: activity supervised  Intervention: Prevent Infection  Recent Flowsheet Documentation  Taken 12/23/2023 1600 by Nicolasa Courtney RN  Infection Prevention: cohorting utilized  Goal: Optimal Comfort and Wellbeing  Intervention: Provide Person-Centered Care  Recent Flowsheet Documentation  Taken 12/23/2023 1657 by Nicolasa Courtney RN  Trust Relationship/Rapport: care explained     Problem: Artificial Airway  Goal: Effective Communication  Intervention: Ensure Effective Communication  Recent Flowsheet Documentation  Taken 12/23/2023 1657 by Nicolasa Courtney RN  Communication Enhancement Strategies: call light answered in person  Family/Support System Care: support provided  Trust Relationship/Rapport: care explained  Goal: Optimal Device Function  Intervention: Optimize Device Care and Function  Recent Flowsheet Documentation  Taken 12/23/2023 1600 by Nicolasa Courtney RN  Airway Safety Measures: all equipment/monitors on and audible  Aspiration Precautions: NPO pending swallow screening/evaluation     Problem: Pain Acute  Goal: Optimal Pain Control and Function  Intervention: Prevent or Manage Pain  Recent Flowsheet Documentation  Taken 12/23/2023 1657 by Nicolasa Courtney RN  Sensory Stimulation Regulation: care clustered  Taken 12/23/2023 1600 by Nicolasa Courtney  RN  Medication Review/Management: medications reviewed  Intervention: Optimize Psychosocial Wellbeing  Recent Flowsheet Documentation  Taken 12/23/2023 1657 by Nicolasa Courtney RN  Supportive Measures: active listening utilized     Problem: Enteral Nutrition  Goal: Absence of Aspiration Signs and Symptoms  Intervention: Minimize Aspiration Risk  Recent Flowsheet Documentation  Taken 12/23/2023 1600 by Nicolasa Courtney RN  Enteral Feeding Safety: tubing labeled as enteral feeding  Goal: Safe, Effective Therapy Delivery  Intervention: Prevent Feeding-Related Adverse Events  Recent Flowsheet Documentation  Taken 12/23/2023 1600 by Nicolasa Courtney RN  Enteral Feeding Safety: tubing labeled as enteral feeding     Problem: Mechanical Ventilation Invasive  Goal: Effective Communication  Intervention: Ensure Effective Communication  Recent Flowsheet Documentation  Taken 12/23/2023 1657 by Nicolasa Courtney RN  Communication Enhancement Strategies: call light answered in person  Family/Support System Care: support provided  Trust Relationship/Rapport: care explained  Goal: Optimal Device Function  Intervention: Optimize Device Care and Function  Recent Flowsheet Documentation  Taken 12/23/2023 1600 by Nicolasa Courtney RN  Airway Safety Measures: all equipment/monitors on and audible  Goal: Mechanical Ventilation Liberation  Intervention: Promote Extubation and Mechanical Ventilation Liberation  Recent Flowsheet Documentation  Taken 12/23/2023 1657 by Nicolasa Courtney RN  Environmental Support: calm environment promoted  Taken 12/23/2023 1600 by Nicolasa Courtney RN  Medication Review/Management: medications reviewed     Problem: Anxiety Signs/Symptoms  Goal: Improved Mood Symptoms (Anxiety Signs/Symptoms)  Intervention: Optimize Emotion and Mood  Recent Flowsheet Documentation  Taken 12/23/2023 1657 by Nicolasa Courtney RN  Supportive Measures: active listening utilized  Goal: Enhanced Social, Occupational or  Functional Skills (Anxiety Signs/Symptoms)  Intervention: Promote Social, Occupational and Functional Ability  Recent Flowsheet Documentation  Taken 12/23/2023 1657 by Nicolasa Courtney, RN  Trust Relationship/Rapport: care explained     Problem: Skin Injury Risk Increased  Goal: Skin Health and Integrity  Intervention: Optimize Skin Protection  Recent Flowsheet Documentation  Taken 12/23/2023 1600 by Nicolasa Courtney, RN  Activity Management: up in stretcher chair     Problem: Diarrhea  Goal: Effective Diarrhea Management  Intervention: Manage Diarrhea  Recent Flowsheet Documentation  Taken 12/23/2023 1600 by Nicolasa Courtney, RN  Fluid/Electrolyte Management: fluids provided  Isolation Precautions: enteric precautions maintained  Medication Review/Management: medications reviewed

## 2023-12-24 NOTE — PLAN OF CARE
Problem: Anxiety Signs/Symptoms  Goal: Optimized Energy Level (Anxiety Signs/Symptoms)  Outcome: Progressing     Problem: Diarrhea  Goal: Effective Diarrhea Management  Outcome: Progressing  Intervention: Manage Diarrhea  Recent Flowsheet Documentation  Taken 12/24/2023 1300 by Gaby Parada RN  Isolation Precautions: enteric precautions maintained  Medication Review/Management: medications reviewed     Problem: Adult Inpatient Plan of Care  Goal: Absence of Hospital-Acquired Illness or Injury  Intervention: Identify and Manage Fall Risk  Recent Flowsheet Documentation  Taken 12/24/2023 1300 by Gaby Parada RN  Safety Promotion/Fall Prevention:   activity supervised   clutter free environment maintained   lighting adjusted   nonskid shoes/slippers when out of bed   patient and family education   room near nurse's station  Intervention: Prevent Infection  Recent Flowsheet Documentation  Taken 12/24/2023 1300 by Gaby Parada RN  Infection Prevention:   equipment surfaces disinfected   hand hygiene promoted   personal protective equipment utilized   rest/sleep promoted   single patient room provided  Goal: Optimal Comfort and Wellbeing  Intervention: Provide Person-Centered Care  Recent Flowsheet Documentation  Taken 12/24/2023 1300 by Gaby Parada RN  Trust Relationship/Rapport:   care explained   choices provided   questions answered   questions encouraged   reassurance provided     Problem: Artificial Airway  Goal: Effective Communication  Intervention: Ensure Effective Communication  Recent Flowsheet Documentation  Taken 12/24/2023 1300 by Gaby Parada RN  Communication Enhancement Strategies: call light answered in person  Family/Support System Care:   self-care encouraged   presence promoted   involvement promoted   support provided  Trust Relationship/Rapport:   care explained   choices provided   questions answered   questions encouraged   reassurance  provided  Goal: Optimal Device Function  Intervention: Optimize Device Care and Function  Recent Flowsheet Documentation  Taken 12/24/2023 1300 by Gaby Parada RN  Airway Safety Measures: all equipment/monitors on and audible     Problem: Pain Acute  Goal: Optimal Pain Control and Function  Intervention: Prevent or Manage Pain  Recent Flowsheet Documentation  Taken 12/24/2023 1300 by Gaby Parada RN  Sensory Stimulation Regulation:   care clustered   quiet environment promoted   television on  Medication Review/Management: medications reviewed  Intervention: Optimize Psychosocial Wellbeing  Recent Flowsheet Documentation  Taken 12/24/2023 1300 by Gaby Parada RN  Supportive Measures:   active listening utilized   self-care encouraged     Problem: Enteral Nutrition  Goal: Absence of Aspiration Signs and Symptoms  Intervention: Minimize Aspiration Risk  Recent Flowsheet Documentation  Taken 12/24/2023 1300 by Gaby Parada RN  Enteral Feeding Safety: tubing labeled as enteral feeding  Goal: Safe, Effective Therapy Delivery  Intervention: Prevent Feeding-Related Adverse Events  Recent Flowsheet Documentation  Taken 12/24/2023 1300 by Gaby Parada RN  Enteral Feeding Safety: tubing labeled as enteral feeding     Problem: Mechanical Ventilation Invasive  Goal: Effective Communication  Intervention: Ensure Effective Communication  Recent Flowsheet Documentation  Taken 12/24/2023 1300 by Gaby Parada RN  Communication Enhancement Strategies: call light answered in person  Family/Support System Care:   self-care encouraged   presence promoted   involvement promoted   support provided  Trust Relationship/Rapport:   care explained   choices provided   questions answered   questions encouraged   reassurance provided  Goal: Optimal Device Function  Intervention: Optimize Device Care and Function  Recent Flowsheet Documentation  Taken 12/24/2023 1300 by  Gaby Parada RN  Airway Safety Measures: all equipment/monitors on and audible  Goal: Mechanical Ventilation Liberation  Intervention: Promote Extubation and Mechanical Ventilation Liberation  Recent Flowsheet Documentation  Taken 12/24/2023 1300 by Gaby Parada RN  Medication Review/Management: medications reviewed  Environmental Support:   calm environment promoted   rest periods encouraged     Problem: Anxiety Signs/Symptoms  Goal: Improved Mood Symptoms (Anxiety Signs/Symptoms)  Intervention: Optimize Emotion and Mood  Recent Flowsheet Documentation  Taken 12/24/2023 1300 by Gaby Parada RN  Supportive Measures:   active listening utilized   self-care encouraged  Goal: Enhanced Social, Occupational or Functional Skills (Anxiety Signs/Symptoms)  Intervention: Promote Social, Occupational and Functional Ability  Recent Flowsheet Documentation  Taken 12/24/2023 1300 by Gaby Parada RN  Social Functional Ability Promotion: autonomy promoted  Trust Relationship/Rapport:   care explained   choices provided   questions answered   questions encouraged   reassurance provided   Goal Outcome Evaluation:  Patient continue to have loose stool, on schedule imodium. Patient ambulated on the unit with staff once accompanied by family Patient also did some strengthen exercise with writer in her room. Started on hydrochlorothiazide,first dose given. Blister on right foot remain intact. Denied pain, family at bedside visiting. Will continue plan of care.

## 2023-12-24 NOTE — PLAN OF CARE
Problem: Anxiety Signs/Symptoms  Goal: Optimized Energy Level (Anxiety Signs/Symptoms)  Outcome: Progressing     Problem: Diarrhea  Goal: Effective Diarrhea Management  Outcome: Progressing   Goal Outcome Evaluation:    Patient appeared to rest intermittently during shift, up x 2 to use commode with small loose bm x 1 and urine output x 2, alert x 4, no change in alertness or LOC, prn adm  for pain and anxiety with effectiveness, vss, labs drawn this morning, picc line positional with blood return, will continue to monitor.                         Bexarotene Counseling:  I discussed with the patient the risks of bexarotene including but not limited to hair loss, dry lips/skin/eyes, liver abnormalities, hyperlipidemia, pancreatitis, depression/suicidal ideation, photosensitivity, drug rash/allergic reactions, hypothyroidism, anemia, leukopenia, infection, cataracts, and teratogenicity.  Patient understands that they will need regular blood tests to check lipid profile, liver function tests, white blood cell count, thyroid function tests and pregnancy test if applicable.

## 2023-12-25 NOTE — PLAN OF CARE
Problem: Artificial Airway  Goal: Effective Communication  Outcome: Progressing  Goal: Optimal Device Function  Outcome: Progressing  Goal: Absence of Device-Related Skin or Tissue Injury  Outcome: Progressing   Goal Outcome Evaluation:       RT PROGRESS NOTE     DATA:     CURRENT SETTINGS:             TRACH TYPE / SIZE: # 6 BIVONA TTS changed on 12/22/23             MODE: TM/PMV              FIO2: 30% @30 LPM     ACTION:             THERAPIES: Albuterol  TID, Mucomyst BID , MetaNeb BID             SUCTION:                           FREQUENCY:X 2                        AMOUNT: Small                          CONSISTENCY: thick                          COLOR: White/pale yellow              SPONTANEOUS COUGH EFFORT/STRENGTH OF EFFORT (not elicited by suctioning): fair                               WEANING PHASE:  2                        WEAN MODE:TM/PMV as tolerated                         WEAN TIME: CONT.                        END WEAN REASON:        RESPONSE:             BS:                VITAL SIGNS: Sat 97-98%, HR 60-70, RR 20-22             EMOTIONAL NEEDS / CONCERNS: no                RISK FOR SELF DECANNULATION:  no                        RISK DUE TO:                          INTERVENTION/S IN PLACE IS/ARE:         NOTE / PLAN:  Cont. Current plan of care

## 2023-12-25 NOTE — PLAN OF CARE
Problem: Adult Inpatient Plan of Care  Goal: Optimal Comfort and Wellbeing  Outcome: Progressing  Intervention: Provide Person-Centered Care  Recent Flowsheet Documentation  Taken 12/25/2023 0056 by Judy Pino RN  Trust Relationship/Rapport:   care explained   choices provided   emotional support provided     Problem: Anxiety Signs/Symptoms  Goal: Optimized Cognitive Function (Anxiety Signs/Symptoms)  Outcome: Progressing  Goal: Improved Mood Symptoms (Anxiety Signs/Symptoms)  Outcome: Progressing   Goal Outcome Evaluation:       Pt alert and oriented X 4. Denied pain  this shift. C/o  being anxious . PRN Hydroxyzine given at 0520 then requested for PRN Simethicone for feeling bloated. Otherwise, pt slept  majority of the shift.

## 2023-12-25 NOTE — PROGRESS NOTES
[facility]    Medicine Progress Note - Hospitalist Service    Date of Admission:  12/15/2023    Brief summary:  Priya Funez is a 83 year old female with a PMHx of HFpEF, severe TR, permanent AF s/p AVN ablation with PPM implant 1/11/19 s/p extraction TV (transvenous) PPM and implant leadless PPM 7/31/23, emergent aortic dissection repair 1/4/19, HTN, CKD, NEDA, and obesity who presented to [facility] as an outpatient for elective TVR done on 11/28/2023.  Immediate postoperative course was complicated by acute hypoxic and hypercapnic respiratory failure requiring BiPAP and probable pneumonia and treated with 7 days of IV Zosyn, patient was transferred to surgical telemetry floor on 12/2/2020.  On 12/6/2023 she was transferred back to CVICU after RRT for acute hypoxic respiratory distress and subsequent respiratory arrest with short CPR, re-intubation, and ROSC achieved. She sustained mildly displaced and nondisplaced right 3-5 rib fractures with associated right chest wall hematoma and nondisplaced anterolateral left 4th and 5th rib fractures. She was treated with meropenem 7 day course. She also has hx of recent C diff and multiple loose stools, for which she completed a 7 day po vancomycin course (per antimicrobial stewardship team, more likely to be colonization with recent history and no apparent treatment). Patient was transiently hyperglycemic and treated with insulin infusion then transitioned to sliding scale insulin per protocol. Blood sugars remained stable, has not required insulin coverage.  She remained intubated in the CVICU until undergoing tracheostomy by Dr. Pitts on 12/13/23, doing well with trach dome and PS currently.  She was treated for volume overload with diuretics (Bumex and chlorothiazide) with subsequent hypernatremia and hypotension requiring FWF and pressor support.  Spironolactone, torsemide and metoprolol discontinued and midodrine started with now stable blood pressures. Pre-op weight 70.9 kg,  discharge weight 74.1 kg (163 lbs) on 12/15/2023.  Patient was discharged to LTACH 12/15/2023 for continuing vent weaning, therapies      LTACH course:  12/17: Vitals stable.  Scheduled midodrine discontinued with BP stable in the 140s.  Diuretic therapy with torsemide restarted given peripheral edema and volume status, however at lower dose of 10 mg daily (home dose 40 mg daily) titrate slowly based on BP response.  Resume metoprolol for A-fib rate control as well gradually.  Labs reviewed, stable electrolytes, creatinine 0.82. ProBNP 3710, down from 06754 and 08067.  Hgb 8.0 from 7.6.  INR 0.98 from 1.12.  Continue heparin and Coumadin together for now until INR therapeutic.  Started hydroxyzine for anxiety 12/17.  A.m. labs ordered.       12/18-12/24:  increased torsemide to 20 mg po daily due to peripheral edema on 12/18, having loose stools,  changed tube feeds to higher fiber formula and add imodium daily and prn.  Still with edema,  increase toresemide to 40 mg po daily on 12/19 and increased imodium to bid and prn, changed heparin to lovenox for pt comfort.  She is getting prn hydroxyzine for anxiety which we should try to titrate off as anticoholinergics are not optimal in the geriatric population. 12/22 switched to bumex 1 mg daily and increased to bumex 1 mg BID 12/23.  Encouraged leg elevation above heart. Give hydrochlorothiazide one dose 12/24 - if that doesn't help then would need to try IV diuretics.  INR above 2 for the first time on 12/24 so if above 2 on 12/25 then could stop the lovenox.    12/25: Continues to have lower extremity edema.  Still diuresing.  On vent weaning phase 2.  Not making much progress.    Assessment & Plan   -No new changes at this time.  Continue with current medical management.    Acute respiratory failure and arrest on 12/6/23   Acute hypercapnic and hypoxic respiratory failure requiring BiPAP  s/p tracheostomy 12/13/23  s/p Mechanical ventilation  Aspiration pneumonitis  vs pneumonia  Possible aspiration event 12/1  Near complete plugging of left lower lobe on CT   Acute mildly displaced and nondisplaced right 3-5 rib fractures with associated right chest wall hematoma and nondisplaced anterolateral left fourth and fifth rib fractures  -Pulmonology consulted for vent weaning. On weaning phase 2  -RT consult for tracheostomy cares, oxygenation, nebs, pulmonary therapies  - Tracheostomy sutures to be removed POD#5 (12/18/2023)  - Mucomyst, duoneb w/ RT Q4H   - SLP speaking valve as tolerated      S/p respiratory arrest 12/6  Hx of severe TR s/p TVR  HFpEF  Hx of Afib s/p AVN ablation - PPM, upgraded to leadless 7/2023  Hypertension  Hyperlipidemia  Hx of aortic dissection repair 2019  Respiratory arrest 12/6   - Stable paced rhythm with underlying atrial flutter. On review of cardiology, underlying aflutter/afib has been present since at least June of this year. Device RN adjusted PPM to PTA settings rate 60 at [facility].   - Pre-op 9/8/23 echo: LVEF 55/60%, mildly dilated RV, normal function  - Echo 12/4 with LVEF 60-65%, normal RV function, TV mean gradient 5 mmHg, no TR  - Echo 12/7 with no significant change from previous with the exception of mild reduction of RV. RV free wall dyskinesia.   - ASA 81 mg daily  -was on scheduled midodrine 10 mg Q8H but stopped that 12/16/23 since BP in the 140s systolic and stable  -Continue midodrine 5 mg TID PRN for SBP<100   - Started Warfarin 12/15.  Pharmacy consulted for Coumadin dosing (INR goal 2-3)   -Continue lovenox (DVT prophylaxis dose) and Coumadin together for now until INR therapeutic - INR 2.04 12/24 so if stays therapeutic on 12/25 then could stop the lovenox     Acute on chronic diastolic heart failure with Elevated BNP, improving   - Held PTA spironolactone, torsemide, metoprolol due to hypotension at prior hospital  - Echo 12/4 with LVEF 60-65%, normal RV function, TV mean gradient 5 mmHg, no TR  - Echo 12/7 with no significant  change from previous with the exception of mild reduction of RV. RV free wall dyskinesia.   - FLUID STATUS: Pre-op weight 70.9 kg, discharge weight 74.1 kg (163 lbs) on 12/15/2023 and 165 lbs on 12/16  -increased torsemide to 40 mg po daily on 12/19 then switched to bumex 12/22 and increased to bumex 1 mg BID on 12/23  -proBNP trend appears improved from a peak of 29226 to now 3710.  -has a new blister on her right foot likely due to fluid retention  -She received 1 dose of hydrochlorothiazide 12/24/2023.  This seems to have helped in diuresing.  Will switch Bumex 1 mg twice daily from p.o. to IV and plan to increase dose if BP allows to help diurese her better.      Bullous/blister on top of Right foot  - solitary - likely due to fluid retention but if worsens may need to consider alternative etiology.  Would not unroof, hopefully over time will resorb.  Doubt bullous pemphigoid given its a solitary lesion.    Multiple loose stools   Hx of C. Difficile (9/2023)  Severe malnutrition in the context of acute illness  - Nutrition consulted for continuing tube feedings- will change to higher fiber tube feeds - on 12/18  - NJT and enteral feeds at goal (45 mL/hr)   - SLP: NPO  - PPI famotidine  - Bowel regimen discontinued due to continued loose stools  - Finished vancomycin po 7 day course   - Continue Banatrol  - PRN Zofran  4 mg PO or IV Q6H  - PRN Compazine 5 mg IV Q6H   - loperamide 2 mg BID and prn     Hypernatremia resolved  Hypokalemia, resolved   CKD stage 3  Lactic acidosis, resolved  IMAN on CKD, resolved   Edema  Baseline creat ~1.2-1.3   FLUID STATUS: Pre-op weight 70.9 kg, discharge weight 74.1 kg (163 lbs).   - Elevated sodium, Na 151 --> 147 on recheck, likely 2/2 aggressive diureses S/p bumex and chlorotiazide prior to transfer  -On free water flushes 200 mL every 4 hourly on 12/16. Fluid up and normal Na so decreased to 30ml q6h and switched her to bumex 12/22  - I/O has been inaccurate due to unmeasured  stools and voids.  - Replete lytes per protocol  - Avoid/limit nephrotoxins as able  - Strict I/O, daily weights, avoid/limit nephrotoxins     Stress induced hyperglycemia  T2DM  Hgb A1c 5.7%, stable off meds  - PTA Jardiance on hold, resume once medically optimized   - Goal BG <180 for optimal healing     Stress induced leukocytosis  Probable aspiration pneumonitis vs pneumonia, resolved  - 11/30 Zosyn 7-day course for pneumonia (finished)   - 12/6 Given further decompensation, Vanco and meropenem was started on 12/6   - Finished meropenem 7 day course (12/12)   - Finished Vancomycin PO 7 day course (for c-diff) (12/13)  - Blood culture with NGTD   - Urine with Candida and sputum with Yeast, likely both colonizations.   - Urine culture with NGTD     Hx of recent C. Difficile  - C. Difficile toxin PCR positive 12/3 with negative antigen and negative toxin - per antimicrobial stewardship team, more likely to be colonization with recent history and no apparent treatment  - Finished oral vancomycin course on 12/13, still with diarrhea but no abd pain, leucocytosis, or fevers so monitoring for now  - Monitor fever curve, WBC, and inflammatory markers as appropriate  - Enteric precautions per infection prevention protocol      3rd, 4th right rib fractures due to CPR  -Continue mini thoracotomy precautions  -Judicious use of narcotics, de-escalate as able     Acute blood loss anemia  - Hgb has been stable.   -Hgb 7.6 on 12/16 and 8.0 on 12/17/2023  -Iron studies: Serum iron 29, iron binding capacity 216, saturation index 13%.  -Follow CBC with weekly labs.   -Transfuse if Hgb<7     Acute postoperative pain  - PRN: Tylenol, oxycodone     Hx of general anxiety disorder  - PTA Lexapro and Remeron  - Scheduled Melatonin HS      Perineal irritation    - WOC consulted    Diet: Adult Formula Drip Feeding: Continuous Jevity 1.5; Nasojejunal; Goal Rate: 50; mL/hr; TF to run x22 hours holding 1 hr before/after warfarin  NPO for  "Medical/Clinical Reasons Except for: Ice Chips, NPO but receiving Tube Feeding    DVT Prophylaxis: Enoxaparin (Lovenox) subcutaneous transitioning to warfarin   Barrientos Catheter: Not present  Lines: PRESENT      PICC 12/07/23 Triple Lumen Right Basilic ok to use PICC-Site Assessment: WDL      Cardiac Monitoring: None  Code Status: Full Code      Clinically Significant Risk Factors              # Hypoalbuminemia: Lowest albumin = 2.7 g/dL at 12/18/2023  5:17 AM, will monitor as appropriate     # Hypertension: Noted on problem list    # Chronic heart failure with preserved ejection fraction: heart failure noted on problem list and last echo with EF >50%       # Overweight: Estimated body mass index is 29.21 kg/m  as calculated from the following:    Height as of this encounter: 1.613 m (5' 3.5\").    Weight as of this encounter: 76 kg (167 lb 8 oz).   # Severe Malnutrition: based on nutrition assessment      # Financial/Environmental Concerns: none   # Pacemaker present  # History of CABG: noted on surgical history       Disposition Plan     Expected Discharge Date: 01/02/2024      Destination: home with family;foster/protective services            Torsten Andrade MD  Hospitalist Service  LTACH  Securely message with [employer] (more info)  Text page via [employer] Paging/Directory   ______________________________________________________________________    Interval History   No new events overnight per RN.  Patient reports she diuresed well overnight.  On vent weaning phase 2.  She reports no new complaints at this time.    Physical Exam   Vital Signs: Temp: 97.9  F (36.6  C) Temp src: Oral BP: 133/60 Pulse: 61   Resp: 20 SpO2: 98 % O2 Device: Trach dome Oxygen Delivery: 30 LPM  Weight: 167 lbs 8 oz    GENERAL: She is a well grown well-developed adult female resting in bed comfortably.  She appears in no distress.  HEENT: Head is normocephalic atraumatic eyes pupils appear equal round and reactive to light.  NECK: Supple, " Tracheostomy in place  NOSE: NJ tube in place  LUNGS: Mild rhonchi bilaterally  HEART: S1 S2, Rate and rhythm is regular. No murmurs, 2+ BLE edema  ABDOMEN: Soft, nontender, no distension. Bowel sounds are positive. No guarding or rebound.   EXTREMITIES: 2-3+ bilateral lower extremity edema noted   SKIN: large 3-4 cm fluid filled sac on dorsum right foot, no erythema or drainage   PSYCHIATRIC: Normal affect and cognition     Medical Decision Making       45 MINUTES SPENT BY ME on the date of service doing chart review, history, exam, documentation & further activities per the note.      Data   Lab Results   Component Value Date    WBC 5.0 12/25/2023    WBC 5.3 06/14/2021     Lab Results   Component Value Date    RBC 2.67 12/25/2023    RBC 4.33 06/14/2021     Lab Results   Component Value Date    HGB 7.8 12/25/2023    HGB 12.8 06/14/2021     Lab Results   Component Value Date    HCT 26.1 12/25/2023    HCT 40.1 06/14/2021     Lab Results   Component Value Date    MCV 98 12/25/2023    MCV 93 06/14/2021     Lab Results   Component Value Date    MCH 29.2 12/25/2023    MCH 29.6 06/14/2021     Lab Results   Component Value Date    MCHC 29.9 12/25/2023    MCHC 31.9 06/14/2021     Lab Results   Component Value Date    RDW 18.1 12/25/2023    RDW 13.5 06/14/2021     Lab Results   Component Value Date     12/25/2023     06/14/2021       Last Comprehensive Metabolic Panel:  Sodium   Date Value Ref Range Status   12/25/2023 138 135 - 145 mmol/L Final     Comment:     Reference intervals for this test were updated on 09/26/2023 to more accurately reflect our healthy population. There may be differences in the flagging of prior results with similar values performed with this method. Interpretation of those prior results can be made in the context of the updated reference intervals.    06/23/2021 138 133 - 144 mmol/L Final     Potassium   Date Value Ref Range Status   12/25/2023 3.9 3.4 - 5.3 mmol/L Final   12/13/2022  3.8 3.4 - 5.3 mmol/L Final   06/23/2021 4.5 3.4 - 5.3 mmol/L Final     Potassium POCT   Date Value Ref Range Status   12/21/2023 4.0 3.5 - 5.0 mmol/L Final     Chloride   Date Value Ref Range Status   12/25/2023 95 (L) 98 - 107 mmol/L Final   12/13/2022 99 94 - 109 mmol/L Final   06/23/2021 104 94 - 109 mmol/L Final     Carbon Dioxide   Date Value Ref Range Status   06/23/2021 31 20 - 32 mmol/L Final     Carbon Dioxide (CO2)   Date Value Ref Range Status   12/25/2023 36 (H) 22 - 29 mmol/L Final   12/13/2022 33 (H) 20 - 32 mmol/L Final     Anion Gap   Date Value Ref Range Status   12/25/2023 7 7 - 15 mmol/L Final   12/13/2022 7 3 - 14 mmol/L Final   06/23/2021 3 3 - 14 mmol/L Final     Glucose   Date Value Ref Range Status   12/25/2023 128 (H) 70 - 99 mg/dL Final   12/13/2022 84 70 - 99 mg/dL Final   06/23/2021 86 70 - 99 mg/dL Final     GLUCOSE BY METER POCT   Date Value Ref Range Status   12/23/2023 120 (H) 70 - 99 mg/dL Final     Urea Nitrogen   Date Value Ref Range Status   12/25/2023 30.9 (H) 8.0 - 23.0 mg/dL Final   12/13/2022 31 (H) 7 - 30 mg/dL Final   06/23/2021 30 7 - 30 mg/dL Final     Creatinine   Date Value Ref Range Status   12/25/2023 0.86 0.51 - 0.95 mg/dL Final   06/23/2021 1.29 (H) 0.52 - 1.04 mg/dL Final     GFR Estimate   Date Value Ref Range Status   12/25/2023 67 >60 mL/min/1.73m2 Final   06/23/2021 39 (L) >60 mL/min/[1.73_m2] Final     Comment:     Non  GFR Calc  Starting 12/18/2018, serum creatinine based estimated GFR (eGFR) will be   calculated using the Chronic Kidney Disease Epidemiology Collaboration   (CKD-EPI) equation.       Calcium   Date Value Ref Range Status   12/25/2023 8.8 8.8 - 10.2 mg/dL Final   06/23/2021 9.2 8.5 - 10.1 mg/dL Final     Bilirubin Total   Date Value Ref Range Status   12/25/2023 0.5 <=1.2 mg/dL Final   04/04/2019 1.0 0.2 - 1.3 mg/dL Final     Alkaline Phosphatase   Date Value Ref Range Status   12/25/2023 141 40 - 150 U/L Final     Comment:      Reference intervals for this test were updated on 11/14/2023 to more accurately reflect our healthy population. There may be differences in the flagging of prior results with similar values performed with this method. Interpretation of those prior results can be made in the context of the updated reference intervals.   04/04/2019 104 40 - 150 U/L Final     ALT   Date Value Ref Range Status   12/25/2023 14 0 - 50 U/L Final     Comment:     Reference intervals for this test were updated on 6/12/2023 to more accurately reflect our healthy population. There may be differences in the flagging of prior results with similar values performed with this method. Interpretation of those prior results can be made in the context of the updated reference intervals.     06/14/2021 22 0 - 50 U/L Final     AST   Date Value Ref Range Status   12/25/2023 19 0 - 45 U/L Final     Comment:     Reference intervals for this test were updated on 6/12/2023 to more accurately reflect our healthy population. There may be differences in the flagging of prior results with similar values performed with this method. Interpretation of those prior results can be made in the context of the updated reference intervals.   04/04/2019 27 0 - 45 U/L Final

## 2023-12-25 NOTE — PLAN OF CARE
Problem: Enteral Nutrition  Goal: Absence of Aspiration Signs and Symptoms  Outcome: Progressing  Intervention: Minimize Aspiration Risk  Recent Flowsheet Documentation  Taken 12/25/2023 0900 by Gaby Parada RN  Enteral Feeding Safety: tubing labeled as enteral feeding     Problem: Anxiety Signs/Symptoms  Goal: Optimized Energy Level (Anxiety Signs/Symptoms)  Outcome: Progressing     Problem: Diarrhea  Goal: Effective Diarrhea Management  Outcome: Progressing  Intervention: Manage Diarrhea  Recent Flowsheet Documentation  Taken 12/25/2023 0900 by Gaby Parada RN  Isolation Precautions: enteric precautions maintained  Medication Review/Management: medications reviewed     Problem: Adult Inpatient Plan of Care  Goal: Absence of Hospital-Acquired Illness or Injury  Intervention: Identify and Manage Fall Risk  Recent Flowsheet Documentation  Taken 12/25/2023 0900 by Gaby Parada RN  Safety Promotion/Fall Prevention:   activity supervised   clutter free environment maintained   lighting adjusted   nonskid shoes/slippers when out of bed   patient and family education   room near nurse's station  Intervention: Prevent Infection  Recent Flowsheet Documentation  Taken 12/25/2023 0900 by Gaby Parada RN  Infection Prevention:   equipment surfaces disinfected   hand hygiene promoted   personal protective equipment utilized   rest/sleep promoted   single patient room provided  Goal: Optimal Comfort and Wellbeing  Intervention: Provide Person-Centered Care  Recent Flowsheet Documentation  Taken 12/25/2023 0900 by Gaby Parada RN  Trust Relationship/Rapport:   care explained   choices provided   questions answered   questions encouraged   reassurance provided     Problem: Artificial Airway  Goal: Effective Communication  Intervention: Ensure Effective Communication  Recent Flowsheet Documentation  Taken 12/25/2023 0900 by Gaby Parada RN  Communication  Enhancement Strategies: call light answered in person  Family/Support System Care:   self-care encouraged   presence promoted   involvement promoted   support provided  Trust Relationship/Rapport:   care explained   choices provided   questions answered   questions encouraged   reassurance provided  Goal: Optimal Device Function  Intervention: Optimize Device Care and Function  Recent Flowsheet Documentation  Taken 12/25/2023 0900 by Gaby Parada RN  Airway Safety Measures: all equipment/monitors on and audible     Problem: Pain Acute  Goal: Optimal Pain Control and Function  Intervention: Prevent or Manage Pain  Recent Flowsheet Documentation  Taken 12/25/2023 0900 by Gaby Parada RN  Sensory Stimulation Regulation:   care clustered   quiet environment promoted   television on  Medication Review/Management: medications reviewed  Intervention: Optimize Psychosocial Wellbeing  Recent Flowsheet Documentation  Taken 12/25/2023 0900 by Gaby Parada RN  Supportive Measures:   active listening utilized   self-care encouraged     Problem: Enteral Nutrition  Goal: Safe, Effective Therapy Delivery  Intervention: Prevent Feeding-Related Adverse Events  Recent Flowsheet Documentation  Taken 12/25/2023 0900 by Gaby Parada RN  Enteral Feeding Safety: tubing labeled as enteral feeding     Problem: Mechanical Ventilation Invasive  Goal: Effective Communication  Intervention: Ensure Effective Communication  Recent Flowsheet Documentation  Taken 12/25/2023 0900 by Gaby Parada RN  Communication Enhancement Strategies: call light answered in person  Family/Support System Care:   self-care encouraged   presence promoted   involvement promoted   support provided  Trust Relationship/Rapport:   care explained   choices provided   questions answered   questions encouraged   reassurance provided  Goal: Optimal Device Function  Intervention: Optimize Device Care and  Function  Recent Flowsheet Documentation  Taken 12/25/2023 0900 by Gaby Parada RN  Airway Safety Measures: all equipment/monitors on and audible  Goal: Mechanical Ventilation Liberation  Intervention: Promote Extubation and Mechanical Ventilation Liberation  Recent Flowsheet Documentation  Taken 12/25/2023 0900 by Gaby Parada RN  Medication Review/Management: medications reviewed  Environmental Support:   calm environment promoted   rest periods encouraged     Problem: Anxiety Signs/Symptoms  Goal: Improved Mood Symptoms (Anxiety Signs/Symptoms)  Intervention: Optimize Emotion and Mood  Recent Flowsheet Documentation  Taken 12/25/2023 0900 by Gaby Parada RN  Supportive Measures:   active listening utilized   self-care encouraged  Goal: Enhanced Social, Occupational or Functional Skills (Anxiety Signs/Symptoms)  Intervention: Promote Social, Occupational and Functional Ability  Recent Flowsheet Documentation  Taken 12/25/2023 0900 by Gaby Parada RN  Social Functional Ability Promotion: autonomy promoted  Trust Relationship/Rapport:   care explained   choices provided   questions answered   questions encouraged   reassurance provided   Goal Outcome Evaluation:  Patient stable at baseline, Bumex changed to IV. Lower extremities still swollen with blister present on right foot. All daily care needs met, ambulated on the unit with staff. Denied pain, will continue to monitor.

## 2023-12-26 NOTE — PLAN OF CARE
Problem: Artificial Airway  Goal: Effective Communication  Outcome: Progressing  Goal: Optimal Device Function  Outcome: Progressing  Goal: Absence of Device-Related Skin or Tissue Injury  Outcome: Progressing   Goal Outcome Evaluation:       RT PROGRESS NOTE     DATA:     CURRENT SETTINGS:             TRACH TYPE / SIZE: # 6 BIVONA TTS changed on 12/22/23             MODE: Trach Capped                            (TM/PMV )             FIO2:2LNC     ACTION:             THERAPIES: Albuterol  TID, Mucomyst BID , MetaNeb BID             SUCTION:                           FREQUENCY:X 2                        AMOUNT: Small                           CONSISTENCY: thick                         COLOR: white/pale yellow              SPONTANEOUS COUGH EFFORT/STRENGTH OF EFFORT (not elicited by suctioning): fair                               WEANING PHASE:  3                        WEAN MODE:Trach Capped on 2LNC                        WEAN TIME:  Since 09:35                         END WEAN REASON: On going       RESPONSE:             BS:  Clear               VITAL SIGNS: Sat %, HR 61-69, RR 22-24             EMOTIONAL NEEDS / CONCERNS: no                RISK FOR SELF DECANNULATION:  no                        RISK DUE TO:                          INTERVENTION/S IN PLACE IS/ARE:         NOTE / PLAN:  Cont. Current plan of care

## 2023-12-26 NOTE — PROGRESS NOTES
[facility]    Medicine Progress Note - Hospitalist Service    Date of Admission:  12/15/2023    Brief summary:  Priya Funez is a 83 year old female with a PMHx of HFpEF, severe TR, permanent AF s/p AVN ablation with PPM implant 1/11/19 s/p extraction TV (transvenous) PPM and implant leadless PPM 7/31/23, emergent aortic dissection repair 1/4/19, HTN, CKD, NEDA, and obesity who presented to [facility] as an outpatient for elective TVR done on 11/28/2023.  Immediate postoperative course was complicated by acute hypoxic and hypercapnic respiratory failure requiring BiPAP and probable pneumonia and treated with 7 days of IV Zosyn, patient was transferred to surgical telemetry floor on 12/2/2020.  On 12/6/2023 she was transferred back to CVICU after RRT for acute hypoxic respiratory distress and subsequent respiratory arrest with short CPR, re-intubation, and ROSC achieved. She sustained mildly displaced and nondisplaced right 3-5 rib fractures with associated right chest wall hematoma and nondisplaced anterolateral left 4th and 5th rib fractures. She was treated with meropenem 7 day course. She also has hx of recent C diff and multiple loose stools, for which she completed a 7 day po vancomycin course (per antimicrobial stewardship team, more likely to be colonization with recent history and no apparent treatment). Patient was transiently hyperglycemic and treated with insulin infusion then transitioned to sliding scale insulin per protocol. Blood sugars remained stable, has not required insulin coverage.  She remained intubated in the CVICU until undergoing tracheostomy by Dr. Pitts on 12/13/23, doing well with trach dome and PS currently.  She was treated for volume overload with diuretics (Bumex and chlorothiazide) with subsequent hypernatremia and hypotension requiring FWF and pressor support.  Spironolactone, torsemide and metoprolol discontinued and midodrine started with now stable blood pressures. Pre-op weight 70.9 kg,  discharge weight 74.1 kg (163 lbs) on 12/15/2023.  Patient was discharged to LTACH 12/15/2023 for continuing vent weaning, therapies      LTACH course:  12/17: Vitals stable.  Scheduled midodrine discontinued with BP stable in the 140s.  Diuretic therapy with torsemide restarted given peripheral edema and volume status, however at lower dose of 10 mg daily (home dose 40 mg daily) titrate slowly based on BP response.  Resume metoprolol for A-fib rate control as well gradually.  Labs reviewed, stable electrolytes, creatinine 0.82. ProBNP 3710, down from 77738 and 04951.  Hgb 8.0 from 7.6.  INR 0.98 from 1.12.  Continue heparin and Coumadin together for now until INR therapeutic.  Started hydroxyzine for anxiety 12/17.  A.m. labs ordered.       12/18-12/24:  increased torsemide to 20 mg po daily due to peripheral edema on 12/18, having loose stools,  changed tube feeds to higher fiber formula and add imodium daily and prn.  Still with edema,  increase toresemide to 40 mg po daily on 12/19 and increased imodium to bid and prn, changed heparin to lovenox for pt comfort.  She is getting prn hydroxyzine for anxiety which we should try to titrate off as anticoholinergics are not optimal in the geriatric population. 12/22 switched to bumex 1 mg daily and increased to bumex 1 mg BID 12/23.  Encouraged leg elevation above heart. Give hydrochlorothiazide one dose 12/24 - if that doesn't help then would need to try IV diuretics.  INR above 2 for the first time on 12/24 so if above 2 on 12/25 then could stop the lovenox.    12/25: Continues to have lower extremity edema.  Still diuresing.  On vent weaning phase 2.  Not making much progress.  12/26: Patient's INR is 2.63 today, plan to discontinue Lovenox. Has bilateral rash in lower extremities,will give a try of hydrocortisone cream. No new other changes, continue with current medical management    Assessment & Plan   Dermatitis, acute  -Erythematous rash on bilateral lower  extremities  -Hydrocortisone cream 2.5%, apply to affected areas twice daily    Acute respiratory failure and arrest on 12/6/23   Acute hypercapnic and hypoxic respiratory failure requiring BiPAP  s/p tracheostomy 12/13/23  s/p Mechanical ventilation  Aspiration pneumonitis vs pneumonia  Possible aspiration event 12/1  Near complete plugging of left lower lobe on CT   Acute mildly displaced and nondisplaced right 3-5 rib fractures with associated right chest wall hematoma and nondisplaced anterolateral left fourth and fifth rib fractures  -Pulmonology consulted for vent weaning. On weaning phase 2  -RT consult for tracheostomy cares, oxygenation, nebs, pulmonary therapies  - Tracheostomy sutures to be removed POD#5 (12/18/2023)  - Mucomyst, duoneb w/ RT Q4H   - SLP speaking valve as tolerated      S/p respiratory arrest 12/6  Hx of severe TR s/p TVR  HFpEF  Hx of Afib s/p AVN ablation - PPM, upgraded to leadless 7/2023  Hypertension  Hyperlipidemia  Hx of aortic dissection repair 2019  Respiratory arrest 12/6   - Stable paced rhythm with underlying atrial flutter. On review of cardiology, underlying aflutter/afib has been present since at least June of this year. Device RN adjusted PPM to PTA settings rate 60 at [facility].   - Pre-op 9/8/23 echo: LVEF 55/60%, mildly dilated RV, normal function  - Echo 12/4 with LVEF 60-65%, normal RV function, TV mean gradient 5 mmHg, no TR  - Echo 12/7 with no significant change from previous with the exception of mild reduction of RV. RV free wall dyskinesia.   - ASA 81 mg daily  -was on scheduled midodrine 10 mg Q8H but stopped that 12/16/23 since BP in the 140s systolic and stable  -Continue midodrine 5 mg TID PRN for SBP<100   - Started Warfarin 12/15.    -Discontinued lovenox 12/26/2023. Continue with coumadin INR on goal.Pharmacy consulted for Coumadin dosing (INR goal 2-3)      Acute on chronic diastolic heart failure with Elevated BNP, improving   - Held PTA spironolactone,  torsemide, metoprolol due to hypotension at prior hospital  - Echo 12/4 with LVEF 60-65%, normal RV function, TV mean gradient 5 mmHg, no TR  - Echo 12/7 with no significant change from previous with the exception of mild reduction of RV. RV free wall dyskinesia.   - FLUID STATUS: Pre-op weight 70.9 kg, discharge weight 74.1 kg (163 lbs) on 12/15/2023 and 165 lbs on 12/16  -increased torsemide to 40 mg po daily on 12/19 then switched to bumex 12/22 and increased to bumex 1 mg BID on 12/23  -proBNP trend appears improved from a peak of 49304 to now 3710.  -has a new blister on her right foot likely due to fluid retention  -She received 1 dose of hydrochlorothiazide 12/24/2023.  This seems to have helped in diuresing.  Will switch Bumex 1 mg twice daily from p.o. to IV and plan to increase dose if BP allows to help diurese her better.    Bullous/blister on top of Right foot  - solitary - likely due to fluid retention but if worsens may need to consider alternative etiology.  Would not unroof, hopefully over time will resorb.  Doubt bullous pemphigoid given its a solitary lesion.    Multiple loose stools   Hx of C. Difficile (9/2023)  Severe malnutrition in the context of acute illness  - Nutrition consulted for continuing tube feedings- will change to higher fiber tube feeds - on 12/18  - NJT and enteral feeds at goal (45 mL/hr)   - SLP: NPO  - PPI famotidine  - Bowel regimen discontinued due to continued loose stools  - Finished vancomycin po 7 day course   - Continue Banatrol  - PRN Zofran  4 mg PO or IV Q6H  - PRN Compazine 5 mg IV Q6H   - loperamide 2 mg BID and prn     Hypernatremia resolved  Hypokalemia, resolved   CKD stage 3  Lactic acidosis, resolved  IMAN on CKD, resolved   Edema  Baseline creat ~1.2-1.3   FLUID STATUS: Pre-op weight 70.9 kg, discharge weight 74.1 kg (163 lbs).   - Elevated sodium, Na 151 --> 147 on recheck, likely 2/2 aggressive diureses S/p bumex and chlorotiazide prior to transfer  -On  free water flushes 200 mL every 4 hourly on 12/16. Fluid up and normal Na so decreased to 30ml q6h and switched her to bumex 12/22  - I/O has been inaccurate due to unmeasured stools and voids.  - Replete lytes per protocol  - Avoid/limit nephrotoxins as able  - Strict I/O, daily weights, avoid/limit nephrotoxins     Stress induced hyperglycemia  T2DM  Hgb A1c 5.7%, stable off meds  - PTA Jardiance on hold, resume once medically optimized   - Goal BG <180 for optimal healing     Stress induced leukocytosis  Probable aspiration pneumonitis vs pneumonia, resolved  - 11/30 Zosyn 7-day course for pneumonia (finished)   - 12/6 Given further decompensation, Vanco and meropenem was started on 12/6   - Finished meropenem 7 day course (12/12)   - Finished Vancomycin PO 7 day course (for c-diff) (12/13)  - Blood culture with NGTD   - Urine with Candida and sputum with Yeast, likely both colonizations.   - Urine culture with NGTD     Hx of recent C. Difficile  - C. Difficile toxin PCR positive 12/3 with negative antigen and negative toxin - per antimicrobial stewardship team, more likely to be colonization with recent history and no apparent treatment  - Finished oral vancomycin course on 12/13, still with diarrhea but no abd pain, leucocytosis, or fevers so monitoring for now  - Monitor fever curve, WBC, and inflammatory markers as appropriate  - Enteric precautions per infection prevention protocol      3rd, 4th right rib fractures due to CPR  -Continue mini thoracotomy precautions  -Judicious use of narcotics, de-escalate as able     Acute blood loss anemia  - Hgb has been stable.   -Hgb 7.6 on 12/16 and 8.0 on 12/17/2023  -Iron studies: Serum iron 29, iron binding capacity 216, saturation index 13%.  -Follow CBC with weekly labs.   -Transfuse if Hgb<7     Acute postoperative pain  - PRN: Tylenol, oxycodone     Hx of general anxiety disorder  - PTA Lexapro and Remeron  - Scheduled Melatonin HS      Perineal irritation    -  "WOC consulted    Diet: Adult Formula Drip Feeding: Continuous Jevity 1.5; Nasojejunal; Goal Rate: 50; mL/hr; TF to run x22 hours holding 1 hr before/after warfarin  NPO for Medical/Clinical Reasons Except for: Ice Chips, NPO but receiving Tube Feeding    DVT Prophylaxis: Enoxaparin (Lovenox) subcutaneous transitioning to warfarin   Barrientos Catheter: Not present  Lines: PRESENT      PICC 12/07/23 Triple Lumen Right Basilic ok to use PICC-Site Assessment: WDL      Cardiac Monitoring: None  Code Status: Full Code      Clinically Significant Risk Factors              # Hypoalbuminemia: Lowest albumin = 2.7 g/dL at 12/18/2023  5:17 AM, will monitor as appropriate     # Hypertension: Noted on problem list    # Acute heart failure with preserved ejection fraction: heart failure noted on problem list, last echo with EF >50%, and receiving IV diuretics       # Overweight: Estimated body mass index is 29.21 kg/m  as calculated from the following:    Height as of this encounter: 1.613 m (5' 3.5\").    Weight as of this encounter: 76 kg (167 lb 8 oz).   # Severe Malnutrition: based on nutrition assessment      # Financial/Environmental Concerns: none   # Pacemaker present  # History of CABG: noted on surgical history       Disposition Plan     Expected Discharge Date: 01/02/2024      Destination: home with family;foster/protective services            Torsten Andrade MD  Hospitalist Service  LTACH  Securely message with [employer] (more info)  Text page via [employer] [facility] Paging/Directory   ______________________________________________________________________    Interval History   Patient is resting in bed comfortably.  She reports she had a good night.  Progressing well, motivated and remains a standby assist.  On vent weaning phase 2.  No new complaints at this time    Physical Exam   Vital Signs: Temp: 98.6  F (37  C) Temp src: Oral BP: 123/60 Pulse: 69   Resp: 20 SpO2: 97 % O2 Device: Nasal cannula with humidification Oxygen Delivery: 2 " LPM  Weight: 167 lbs 8 oz    GENERAL: She is a well grown well-developed adult female resting in bed comfortably.  She appears in no distress.  HEENT: Head is normocephalic atraumatic eyes pupils appear equal round and reactive to light.  NECK: Supple, Tracheostomy in place  NOSE: NJ tube in place  LUNGS: Mild rhonchi bilaterally  HEART: S1 S2, Rate and rhythm is regular. No murmurs, 2+ BLE edema  ABDOMEN: Soft, nontender, no distension. Bowel sounds are positive. No guarding or rebound.   EXTREMITIES: 2-3+ bilateral lower extremity edema noted   SKIN: large 3-4 cm fluid filled sac on dorsum right foot, no erythema or drainage   PSYCHIATRIC: Normal affect and cognition     Medical Decision Making       45 MINUTES SPENT BY ME on the date of service doing chart review, history, exam, documentation & further activities per the note.      Data   Lab Results   Component Value Date    WBC 5.0 12/25/2023    WBC 5.3 06/14/2021     Lab Results   Component Value Date    RBC 2.67 12/25/2023    RBC 4.33 06/14/2021     Lab Results   Component Value Date    HGB 7.8 12/25/2023    HGB 12.8 06/14/2021     Lab Results   Component Value Date    HCT 26.1 12/25/2023    HCT 40.1 06/14/2021     Lab Results   Component Value Date    MCV 98 12/25/2023    MCV 93 06/14/2021     Lab Results   Component Value Date    MCH 29.2 12/25/2023    MCH 29.6 06/14/2021     Lab Results   Component Value Date    MCHC 29.9 12/25/2023    MCHC 31.9 06/14/2021     Lab Results   Component Value Date    RDW 18.1 12/25/2023    RDW 13.5 06/14/2021     Lab Results   Component Value Date     12/25/2023     06/14/2021       Last Comprehensive Metabolic Panel:  Sodium   Date Value Ref Range Status   12/25/2023 138 135 - 145 mmol/L Final     Comment:     Reference intervals for this test were updated on 09/26/2023 to more accurately reflect our healthy population. There may be differences in the flagging of prior results with similar values performed with  this method. Interpretation of those prior results can be made in the context of the updated reference intervals.    06/23/2021 138 133 - 144 mmol/L Final     Potassium   Date Value Ref Range Status   12/26/2023 3.8 3.4 - 5.3 mmol/L Final   12/13/2022 3.8 3.4 - 5.3 mmol/L Final   06/23/2021 4.5 3.4 - 5.3 mmol/L Final     Potassium POCT   Date Value Ref Range Status   12/21/2023 4.0 3.5 - 5.0 mmol/L Final     Chloride   Date Value Ref Range Status   12/25/2023 95 (L) 98 - 107 mmol/L Final   12/13/2022 99 94 - 109 mmol/L Final   06/23/2021 104 94 - 109 mmol/L Final     Carbon Dioxide   Date Value Ref Range Status   06/23/2021 31 20 - 32 mmol/L Final     Carbon Dioxide (CO2)   Date Value Ref Range Status   12/25/2023 36 (H) 22 - 29 mmol/L Final   12/13/2022 33 (H) 20 - 32 mmol/L Final     Anion Gap   Date Value Ref Range Status   12/25/2023 7 7 - 15 mmol/L Final   12/13/2022 7 3 - 14 mmol/L Final   06/23/2021 3 3 - 14 mmol/L Final     Glucose   Date Value Ref Range Status   12/25/2023 128 (H) 70 - 99 mg/dL Final   12/13/2022 84 70 - 99 mg/dL Final   06/23/2021 86 70 - 99 mg/dL Final     GLUCOSE BY METER POCT   Date Value Ref Range Status   12/23/2023 120 (H) 70 - 99 mg/dL Final     Urea Nitrogen   Date Value Ref Range Status   12/25/2023 30.9 (H) 8.0 - 23.0 mg/dL Final   12/13/2022 31 (H) 7 - 30 mg/dL Final   06/23/2021 30 7 - 30 mg/dL Final     Creatinine   Date Value Ref Range Status   12/25/2023 0.86 0.51 - 0.95 mg/dL Final   06/23/2021 1.29 (H) 0.52 - 1.04 mg/dL Final     GFR Estimate   Date Value Ref Range Status   12/25/2023 67 >60 mL/min/1.73m2 Final   06/23/2021 39 (L) >60 mL/min/[1.73_m2] Final     Comment:     Non  GFR Calc  Starting 12/18/2018, serum creatinine based estimated GFR (eGFR) will be   calculated using the Chronic Kidney Disease Epidemiology Collaboration   (CKD-EPI) equation.       Calcium   Date Value Ref Range Status   12/25/2023 8.8 8.8 - 10.2 mg/dL Final   06/23/2021 9.2 8.5  - 10.1 mg/dL Final     Bilirubin Total   Date Value Ref Range Status   12/25/2023 0.5 <=1.2 mg/dL Final   04/04/2019 1.0 0.2 - 1.3 mg/dL Final     Alkaline Phosphatase   Date Value Ref Range Status   12/25/2023 141 40 - 150 U/L Final     Comment:     Reference intervals for this test were updated on 11/14/2023 to more accurately reflect our healthy population. There may be differences in the flagging of prior results with similar values performed with this method. Interpretation of those prior results can be made in the context of the updated reference intervals.   04/04/2019 104 40 - 150 U/L Final     ALT   Date Value Ref Range Status   12/25/2023 14 0 - 50 U/L Final     Comment:     Reference intervals for this test were updated on 6/12/2023 to more accurately reflect our healthy population. There may be differences in the flagging of prior results with similar values performed with this method. Interpretation of those prior results can be made in the context of the updated reference intervals.     06/14/2021 22 0 - 50 U/L Final     AST   Date Value Ref Range Status   12/25/2023 19 0 - 45 U/L Final     Comment:     Reference intervals for this test were updated on 6/12/2023 to more accurately reflect our healthy population. There may be differences in the flagging of prior results with similar values performed with this method. Interpretation of those prior results can be made in the context of the updated reference intervals.   04/04/2019 27 0 - 45 U/L Final

## 2023-12-26 NOTE — PLAN OF CARE
Problem: Anxiety Signs/Symptoms  Goal: Optimized Energy Level (Anxiety Signs/Symptoms)  Outcome: Progressing  Goal: Enhanced Social, Occupational or Functional Skills (Anxiety Signs/Symptoms)  Intervention: Promote Social, Occupational and Functional Ability  Recent Flowsheet Documentation  Taken 12/26/2023 0037 by Nikhil Jameson Jr RN  Trust Relationship/Rapport:   care explained   choices provided   emotional support provided   questions answered     Patient is alert and oriented, has been able to let needs known using her speaking valve in place. Patient has an episodes of 3 medium loose stools during this shift and a PRN loperamide was given at around 0434hrs with good outcome, patient has no loose BM thereafter. Blister at the right foot still intact.

## 2023-12-26 NOTE — PLAN OF CARE
Problem: Enteral Nutrition  Goal: Feeding Tolerance  Outcome: Progressing   Goal Outcome Evaluation:       Alert and oriented X4, complained pain and nausea, gave PRN Tylenol and Zofran, VS stable, Mg, K, and P protocol done, replacement 20mEq K given, re-draw tomorrow, BLE reddened and edema +3, blister intact on right foot, loose BM X2 with mixed urine.  Continue intake of ice chips throughout shift, family visit and pleasant and cooperative with cares.    Kelley Edmondson RN

## 2023-12-26 NOTE — PROGRESS NOTES
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 Admitted for elective TVR completed on 11/28/2023. Subsequently had respiratory arrest on 12/6 with short CPR prior to ROSC. Treated for presumed pneumonia with multiple courses of abx, now off.  S/p trach on 12/13. Fluid overloaded treated with diuretics. Transferred to LTAC 12/15. Liberated from the vent on 12/20.   Assessment/Plan:     Problems:  Acute hypercapnic and hypoxic respiratory failure s/p trach: improving  RLL/RML atelectasis and small R pleural effusion on imaging  Tracheostomy in place: 6 Bivona 12/22  Dysphagia s/p NJ: passed BDT. VFSS 12/21 without aspiration but deep laryngeal penetration with thin, remains NPO per speech   Respiratory/cardiac arrest on 12/6 possibly d/t mucus plugging.  Multiple rib fx d/t CPR  S/p TVR, HFpEF, LE edema on diuretic: torsemide switched to bumex     Recommendations:  Start phase 3: capping as tolerates  Repeat VBG in am   Nebulized albuterol TID, mucomyst & metaneb BID   Diuresis per primary   GI ppx: famotidine   DVT ppx: coumadin   Routine trach care per protocol   PT/OT/SLP     Subjective:   - awake and sitting up in the chair, interactive and asking questions. Tolerating trach cap without issue, denies dyspnea, pain, n/v. Eager to eat     Tracheal secretions:  Overnight -  2x, small/mod, thick   Yesterday - 2x, small, thick     Cough strength: strong    Ventilator weaning results  - continuous TM since 12/20    Clinical status discussed today with respiratory therapist       Medications:      dextrose      - MEDICATION INSTRUCTIONS -        acetylcysteine  2 mL Nebulization BID    albuterol  2.5 mg Nebulization 3 times daily    aspirin  81 mg Oral or Feeding Tube Daily    bumetanide  1 mg  "Intravenous Q12H    chlorhexidine  15 mL Mouth/Throat Q12H    enoxaparin ANTICOAGULANT  40 mg Subcutaneous Q24H    escitalopram  10 mg Oral or Feeding Tube Daily    famotidine  20 mg Oral or Feeding Tube Daily    loperamide  2 mg Per Feeding Tube BID 09 12    Melatonin  5 mg Per Feeding Tube QPM    miconazole   Topical BID    mirtazapine  15 mg Oral or Feeding Tube At Bedtime    multivitamins w/minerals  15 mL Per Feeding Tube Daily    potassium chloride  20 mEq Oral or Feeding Tube BID    protein modular  1 packet Per Feeding Tube Daily    saccharomyces boulardii  250 mg Oral or Feeding Tube BID    sodium chloride (PF)  10 mL Intracatheter Q8H    Warfarin Therapy Reminder  1 each Oral See Admin Instructions         Exam/Data:   Vitals  /60 (BP Location: Left arm)   Pulse 64   Temp 98.6  F (37  C) (Oral)   Resp 20   Ht 1.613 m (5' 3.5\")   Wt 76 kg (167 lb 8 oz)   SpO2 98%   BMI 29.21 kg/m       I/O last 3 completed shifts:  In: 1356 [I.V.:30; NG/GT:1326]  Out: 250 [Urine:250]  Weight change:     Vent Mode: Trach collar  FiO2 (%): 30 %  Resp: 20      EXAM:  Gen: NAD in chair with trach capped   HEENT: NG tube in place, trach midline/intact, no stridor   CV: RRR, no m/g/r  Resp: diminished b/l; non-labored, no wheeze   Abd: soft, nontender  Skin: b/l lower extremity rash, large bulla on right dorsum  Ext: 2-3+ b/l lower extremity edema  Neuro: alert, follows commands     Labs:  Venous Blood Gas  Recent Labs   Lab 12/21/23  0542   PHV 7.42   PCO2V 55*   PO2V 29   HCO3V 33*   SHAWN 11.1       Complete Blood Count   Recent Labs   Lab 12/25/23  0537 12/23/23  0623 12/22/23  0528 12/21/23  0542 12/21/23  0534   WBC 5.0 5.6 6.3  --  5.8   HGB 7.8* 8.0* 8.2* 8.0* 7.6*    221 255  --  245     Basic Metabolic Panel  Recent Labs   Lab 12/26/23  0557 12/25/23  0537 12/24/23  0656 12/23/23  1215 12/23/23  0623 12/23/23  0604 12/22/23  1157 12/22/23  0528 12/21/23  0546 12/21/23  0542 12/21/23  0534   NA  --  138 "  --   --  140  --   --  139  --  139 141   POTASSIUM 3.8 3.9 4.2  --  4.0  --   --  4.2  --  4.0 4.2   CHLORIDE  --  95*  --   --  97*  --   --  97*  --   --  99   CO2  --  36*  --   --  35*  --   --  34*  --   --  33*   BUN  --  30.9*  --   --  28.8*  --   --  27.4*  --   --  30.6*   CR  --  0.86  --   --  0.86  --   --  0.81  --   --  0.82   GLC  --  128*  --  120* 116* 120*   < > 109*   < > 117 114*    < > = values in this interval not displayed.     Liver Function Tests  Recent Labs   Lab 12/26/23  0557 12/25/23  0537 12/24/23  0656 12/23/23  0623 12/22/23  0528 12/21/23  0534   AST  --  19  --  23 25 23   ALT  --  14  --  18 18 20   ALKPHOS  --  141  --  154* 153* 144   BILITOTAL  --  0.5  --  0.6 0.7 0.6   ALBUMIN  --  3.2*  --  3.2* 3.1* 3.0*   INR 2.63* 2.66* 2.04* 1.69* 1.73* 1.53*     Coagulation Profile  Recent Labs   Lab 12/26/23  0557 12/25/23  0537 12/24/23  0656 12/23/23 0623   INR 2.63* 2.66* 2.04* 1.69*       Radiology: Personally reviewed; radiology read below    XR CHEST 12/19/2023  Poststernotomy changes with a prosthetic TVR and tracheostomy tube tube in place. Feeding tube can be seen coursing into the stomach. Right upper extremity PICC line catheter overlies the proximal right atrium. Elevation of the right hemidiaphragm, unchanged with small right effusion and right basilar and likely middle lobe opacities, presumed atelectasis. Left lung is clear. Heart is slightly enlarged. No signs of failure.    Matt Kennedy, TATIANA  Pulmonary Medicine  [facility]  Pager [phone]  Office [phone]

## 2023-12-26 NOTE — PLAN OF CARE
Problem: Artificial Airway  Goal: Effective Communication  Outcome: Progressing  Goal: Optimal Device Function  Outcome: Progressing  Goal: Absence of Device-Related Skin or Tissue Injury  Outcome: Progressing       RT PROGRESS NOTE     DATA:     CURRENT SETTINGS: AC/18/390/+5/30% (back up setting)             TRACH TYPE / SIZE:  #6 Bivona changed on 12/22/23             MODE:  TM/PMV             FIO2:   30% and 30 lpm     ACTION:             THERAPIES:   ALB TID/MUCOMYST BID/ MET NEB BID             SUCTION:                           FREQUENCY:   X2                        AMOUNT:   Moderate to small                        CONSISTENCY:   Thick                        COLOR:   Pale/yellow             SPONTANEOUS COUGH EFFORT/STRENGTH OF EFFORT (not elicited by suctioning): Yes:strong                              WEANING PHASE:   #2                        WEAN MODE:    30% and 30 lpm tm/pmv as marbella                        WEAN TIME:                           END WEAN REASON:        RESPONSE:             BS:   Clear/diminish             VITAL SIGNS:   SAT 98-99%, HR 61-65 , RR 20-24             EMOTIONAL NEEDS / CONCERNS:  N/A                RISK FOR SELF DECANNULATION:  N/A                        RISK DUE TO:                          INTERVENTION/S IN PLACE IS/ARE:  N/A       NOTE / PLAN:   Pt is on 30% and 30 lpm tm/pmv, marbella well. Cont current therapy and keep sat >/=90%

## 2023-12-27 NOTE — PROGRESS NOTES
[facility]    Medicine Progress Note - Hospitalist Service    Date of Admission:  12/15/2023    Brief summary:  Priya Funez is a 83 year old female with a PMHx of HFpEF, severe TR, permanent AF s/p AVN ablation with PPM implant 1/11/19 s/p extraction TV (transvenous) PPM and implant leadless PPM 7/31/23, emergent aortic dissection repair 1/4/19, HTN, CKD, NEDA, and obesity who presented to [facility] as an outpatient for elective TVR done on 11/28/2023.  Immediate postoperative course was complicated by acute hypoxic and hypercapnic respiratory failure requiring BiPAP and probable pneumonia and treated with 7 days of IV Zosyn, patient was transferred to surgical telemetry floor on 12/2/2020.  On 12/6/2023 she was transferred back to CVICU after RRT for acute hypoxic respiratory distress and subsequent respiratory arrest with short CPR, re-intubation, and ROSC achieved. She sustained mildly displaced and nondisplaced right 3-5 rib fractures with associated right chest wall hematoma and nondisplaced anterolateral left 4th and 5th rib fractures. She was treated with meropenem 7 day course. She also has hx of recent C diff and multiple loose stools, for which she completed a 7 day po vancomycin course (per antimicrobial stewardship team, more likely to be colonization with recent history and no apparent treatment). Patient was transiently hyperglycemic and treated with insulin infusion then transitioned to sliding scale insulin per protocol. Blood sugars remained stable, has not required insulin coverage.  She remained intubated in the CVICU until undergoing tracheostomy by Dr. Pitts on 12/13/23, doing well with trach dome and PS currently.  She was treated for volume overload with diuretics (Bumex and chlorothiazide) with subsequent hypernatremia and hypotension requiring FWF and pressor support.  Spironolactone, torsemide and metoprolol discontinued and midodrine started with now stable blood pressures. Pre-op weight 70.9 kg,  discharge weight 74.1 kg (163 lbs) on 12/15/2023.  Patient was discharged to LTACH 12/15/2023 for continuing vent weaning, therapies      LTACH course:  12/17: Vitals stable.  Scheduled midodrine discontinued with BP stable in the 140s.  Diuretic therapy with torsemide restarted given peripheral edema and volume status, however at lower dose of 10 mg daily (home dose 40 mg daily) titrate slowly based on BP response.  Resume metoprolol for A-fib rate control as well gradually.  Labs reviewed, stable electrolytes, creatinine 0.82. ProBNP 3710, down from 76138 and 39147.  Hgb 8.0 from 7.6.  INR 0.98 from 1.12.  Continue heparin and Coumadin together for now until INR therapeutic.  Started hydroxyzine for anxiety 12/17.  A.m. labs ordered.       12/18-12/24:  increased torsemide to 20 mg po daily due to peripheral edema on 12/18, having loose stools,  changed tube feeds to higher fiber formula and add imodium daily and prn.  Still with edema,  increase toresemide to 40 mg po daily on 12/19 and increased imodium to bid and prn, changed heparin to lovenox for pt comfort.  She is getting prn hydroxyzine for anxiety which we should try to titrate off as anticoholinergics are not optimal in the geriatric population. 12/22 switched to bumex 1 mg daily and increased to bumex 1 mg BID 12/23.  Encouraged leg elevation above heart. Give hydrochlorothiazide one dose 12/24 - if that doesn't help then would need to try IV diuretics.  INR above 2 for the first time on 12/24 so if above 2 on 12/25 then could stop the lovenox.    12/25: Continues to have lower extremity edema.  Still diuresing.  On vent weaning phase 2.  Not making much progress.  12/26: Patient's INR is 2.63 today, plan to discontinue Lovenox. Has bilateral rash in lower extremities,will give a try of hydrocortisone cream. No new other changes, continue with current medical management  12/27.  Patient VBG revealed hypercapnia this morning.  She had to be put back on  the vent.  Chest x-ray currently pending.  Increase Bumex IV from 1 mg twice daily to Bumex 1.5 mg IV twice daily.  Plan to continue diuresing if blood pressure allows may increase to Bumex 2 mg twice daily    Assessment & Plan     Dermatitis, acute  -Improving  -Erythematous rash on bilateral lower extremities  -Continue hydrocortisone cream 2.5%, apply to affected areas twice daily    Acute respiratory failure and arrest on 12/6/23   Acute hypercapnic and hypoxic respiratory failure requiring BiPAP  s/p tracheostomy 12/13/23  s/p Mechanical ventilation  Aspiration pneumonitis vs pneumonia  Possible aspiration event 12/1  Near complete plugging of left lower lobe on CT   Acute mildly displaced and nondisplaced right 3-5 rib fractures with associated right chest wall hematoma and nondisplaced anterolateral left fourth and fifth rib fractures  -Pulmonology consulted for vent weaning.   -RT consult for tracheostomy cares, oxygenation, nebs, pulmonary therapies  - Tracheostomy sutures to be removed POD#5 (12/18/2023)  - Mucomyst, duoneb w/ RT Q4H   - SLP speaking valve as tolerated      S/p respiratory arrest 12/6  Hx of severe TR s/p TVR  HFpEF  Hx of Afib s/p AVN ablation - PPM, upgraded to leadless 7/2023  Hypertension  Hyperlipidemia  Hx of aortic dissection repair 2019  Respiratory arrest 12/6   - Stable paced rhythm with underlying atrial flutter. On review of cardiology, underlying aflutter/afib has been present since at least June of this year. Device RN adjusted PPM to PTA settings rate 60 at [facility].   - Pre-op 9/8/23 echo: LVEF 55/60%, mildly dilated RV, normal function  - Echo 12/4 with LVEF 60-65%, normal RV function, TV mean gradient 5 mmHg, no TR  - Echo 12/7 with no significant change from previous with the exception of mild reduction of RV. RV free wall dyskinesia.   - ASA 81 mg daily  -was on scheduled midodrine 10 mg Q8H but stopped that 12/16/23 since BP in the 140s systolic and stable  -Continue  midodrine 5 mg TID PRN for SBP<100   - Started Warfarin 12/15.    -Discontinued lovenox 12/26/2023. Continue with coumadin INR on goal.Pharmacy consulted for Coumadin dosing (INR goal 2-3)      Acute on chronic diastolic heart failure with Elevated BNP, improving   - Held PTA spironolactone, torsemide, metoprolol due to hypotension at prior hospital  - Echo 12/4 with LVEF 60-65%, normal RV function, TV mean gradient 5 mmHg, no TR  - Echo 12/7 with no significant change from previous with the exception of mild reduction of RV. RV free wall dyskinesia.   - FLUID STATUS: Pre-op weight 70.9 kg, discharge weight 74.1 kg (163 lbs) on 12/15/2023 and 165 lbs on 12/16  -increased torsemide to 40 mg po daily on 12/19 then switched to bumex 12/22 and increased to bumex 1 mg BID on 12/23  -proBNP trend appears improved from a peak of 88354 to now 3710.  -has a new blister on her right foot likely due to fluid retention  -She received 1 dose of hydrochlorothiazide 12/24/2023.  This seems to have helped in diuresing.    -Increase Bumex IV from 1 mg twice daily to Bumex 1.5 mg twice daily.  Consider titrating up if BP allows    Bullous/blister on top of Right foot  - solitary - likely due to fluid retention but if worsens may need to consider alternative etiology.  Would not unroof, hopefully over time will resorb.  Doubt bullous pemphigoid given its a solitary lesion.    Multiple loose stools   Hx of C. Difficile (9/2023)  Severe malnutrition in the context of acute illness  - Nutrition consulted for continuing tube feedings- will change to higher fiber tube feeds - on 12/18  - NJT and enteral feeds at goal (45 mL/hr)   - SLP: NPO  - PPI famotidine  - Bowel regimen discontinued due to continued loose stools  - Finished vancomycin po 7 day course   - Continue Banatrol  - PRN Zofran  4 mg PO or IV Q6H  - PRN Compazine 5 mg IV Q6H   - loperamide 2 mg BID and prn     Hypernatremia resolved  Hypokalemia, resolved   CKD stage 3  Lactic  acidosis, resolved  IMAN on CKD, resolved   Edema  Baseline creat ~1.2-1.3   FLUID STATUS: Pre-op weight 70.9 kg, discharge weight 74.1 kg (163 lbs).   - Elevated sodium, Na 151 --> 147 on recheck, likely 2/2 aggressive diureses S/p bumex and chlorotiazide prior to transfer  -On free water flushes 200 mL every 4 hourly on 12/16. Fluid up and normal Na so decreased to 30ml q6h and switched her to bumex 12/22  - I/O has been inaccurate due to unmeasured stools and voids.  - Replete lytes per protocol  - Avoid/limit nephrotoxins as able  - Strict I/O, daily weights, avoid/limit nephrotoxins     Stress induced hyperglycemia  T2DM  Hgb A1c 5.7%, stable off meds  - PTA Jardiance on hold, resume once medically optimized   - Goal BG <180 for optimal healing     Stress induced leukocytosis  Probable aspiration pneumonitis vs pneumonia, resolved  - 11/30 Zosyn 7-day course for pneumonia (finished)   - 12/6 Given further decompensation, Vanco and meropenem was started on 12/6   - Finished meropenem 7 day course (12/12)   - Finished Vancomycin PO 7 day course (for c-diff) (12/13)  - Blood culture with NGTD   - Urine with Candida and sputum with Yeast, likely both colonizations.   - Urine culture with NGTD     Hx of recent C. Difficile  - C. Difficile toxin PCR positive 12/3 with negative antigen and negative toxin - per antimicrobial stewardship team, more likely to be colonization with recent history and no apparent treatment  - Finished oral vancomycin course on 12/13, still with diarrhea but no abd pain, leucocytosis, or fevers so monitoring for now  - Monitor fever curve, WBC, and inflammatory markers as appropriate  - Enteric precautions per infection prevention protocol      3rd, 4th right rib fractures due to CPR  -Continue mini thoracotomy precautions  -Judicious use of narcotics, de-escalate as able     Acute blood loss anemia  - Hgb has been stable.   -Hgb 7.6 on 12/16 and 8.0 on 12/17/2023  -Iron studies: Serum iron  "29, iron binding capacity 216, saturation index 13%.  -Follow CBC with weekly labs.   -Transfuse if Hgb<7     Acute postoperative pain  - PRN: Tylenol, oxycodone     Hx of general anxiety disorder  - PTA Lexapro and Remeron  - Scheduled Melatonin HS      Perineal irritation    - WOC consulted    Diet: Adult Formula Drip Feeding: Continuous Jevity 1.5; Nasojejunal; Goal Rate: 50; mL/hr; TF to run x22 hours holding 1 hr before/after warfarin  NPO for Medical/Clinical Reasons Except for: Ice Chips, NPO but receiving Tube Feeding    DVT Prophylaxis: Enoxaparin (Lovenox) subcutaneous transitioning to warfarin   Barrientos Catheter: Not present  Lines: PRESENT      PICC 12/07/23 Triple Lumen Right Basilic ok to use PICC-Site Assessment: WDL      Cardiac Monitoring: None  Code Status: Full Code      Clinically Significant Risk Factors          # Hypocalcemia: Lowest iCa = 1.19 mg/dL in last 2 days, will monitor and replace as appropriate     # Hypoalbuminemia: Lowest albumin = 2.7 g/dL at 12/18/2023  5:17 AM, will monitor as appropriate     # Hypertension: Noted on problem list    # Acute heart failure with preserved ejection fraction: heart failure noted on problem list, last echo with EF >50%, and receiving IV diuretics       # Overweight: Estimated body mass index is 29.45 kg/m  as calculated from the following:    Height as of this encounter: 1.613 m (5' 3.5\").    Weight as of this encounter: 76.6 kg (168 lb 14 oz).   # Severe Malnutrition: based on nutrition assessment      # Financial/Environmental Concerns: none   # Pacemaker present  # History of CABG: noted on surgical history       Disposition Plan     Expected Discharge Date: 01/02/2024      Destination: home with family;foster/protective services            Torsten Andrade MD  Hospitalist Service  LTACH  Securely message with [employer] (more info)  Text page via [employer] Paging/Directory   ______________________________________________________________________    Interval " History   This morning patient had to be put on full vent.  VBG was abnormal.  At this time she is on full vent.  States she feels okay.  Chest x-ray currently pending.  Overall just about the same.  No new events overnight per RN    Physical Exam   Vital Signs: Temp: 98.2  F (36.8  C) Temp src: Oral BP: (!) 159/67 Pulse: 65   Resp: 22 SpO2: 96 % O2 Device: Mechanical Ventilator Oxygen Delivery: 30 LPM  Weight: 168 lbs 13.96 oz    GENERAL: She is a well grown well-developed adult female resting in bed comfortably.  She appears in no distress.  HEENT: Head is normocephalic atraumatic eyes pupils appear equal round and reactive to light.  NECK: Supple, Tracheostomy in place  NOSE: NJ tube in place  LUNGS: Mild rhonchi bilaterally  HEART: S1 S2, Rate and rhythm is regular. No murmurs, 2+ BLE edema  ABDOMEN: Soft, nontender, no distension. Bowel sounds are positive. No guarding or rebound.   EXTREMITIES: 2-3+ bilateral lower extremity edema noted   SKIN: large 3-4 cm fluid filled sac on dorsum right foot, no erythema or drainage   PSYCHIATRIC: Normal affect and cognition     Medical Decision Making       45 MINUTES SPENT BY ME on the date of service doing chart review, history, exam, documentation & further activities per the note.      Data   Lab Results   Component Value Date    WBC 5.0 12/25/2023    WBC 5.3 06/14/2021     Lab Results   Component Value Date    RBC 2.67 12/25/2023    RBC 4.33 06/14/2021     Lab Results   Component Value Date    HGB 7.8 12/25/2023    HGB 12.8 06/14/2021     Lab Results   Component Value Date    HCT 26.1 12/25/2023    HCT 40.1 06/14/2021     Lab Results   Component Value Date    MCV 98 12/25/2023    MCV 93 06/14/2021     Lab Results   Component Value Date    MCH 29.2 12/25/2023    MCH 29.6 06/14/2021     Lab Results   Component Value Date    MCHC 29.9 12/25/2023    MCHC 31.9 06/14/2021     Lab Results   Component Value Date    RDW 18.1 12/25/2023    RDW 13.5 06/14/2021     Lab Results    Component Value Date     12/25/2023     06/14/2021       Last Comprehensive Metabolic Panel:  Sodium   Date Value Ref Range Status   12/25/2023 138 135 - 145 mmol/L Final     Comment:     Reference intervals for this test were updated on 09/26/2023 to more accurately reflect our healthy population. There may be differences in the flagging of prior results with similar values performed with this method. Interpretation of those prior results can be made in the context of the updated reference intervals.    06/23/2021 138 133 - 144 mmol/L Final     Sodium POCT   Date Value Ref Range Status   12/27/2023 142 135 - 145 mmol/L Final     Potassium   Date Value Ref Range Status   12/27/2023 5.0 3.4 - 5.3 mmol/L Final   12/13/2022 3.8 3.4 - 5.3 mmol/L Final   06/23/2021 4.5 3.4 - 5.3 mmol/L Final     Potassium POCT   Date Value Ref Range Status   12/27/2023 5.3 (H) 3.5 - 5.0 mmol/L Final     Chloride   Date Value Ref Range Status   12/25/2023 95 (L) 98 - 107 mmol/L Final   12/13/2022 99 94 - 109 mmol/L Final   06/23/2021 104 94 - 109 mmol/L Final     Carbon Dioxide   Date Value Ref Range Status   06/23/2021 31 20 - 32 mmol/L Final     Carbon Dioxide (CO2)   Date Value Ref Range Status   12/25/2023 36 (H) 22 - 29 mmol/L Final   12/13/2022 33 (H) 20 - 32 mmol/L Final     Anion Gap   Date Value Ref Range Status   12/25/2023 7 7 - 15 mmol/L Final   12/13/2022 7 3 - 14 mmol/L Final   06/23/2021 3 3 - 14 mmol/L Final     Glucose   Date Value Ref Range Status   12/13/2022 84 70 - 99 mg/dL Final   06/23/2021 86 70 - 99 mg/dL Final     GLUCOSE BY METER POCT   Date Value Ref Range Status   12/23/2023 120 (H) 70 - 99 mg/dL Final     Glucose Whole Blood POCT   Date Value Ref Range Status   12/27/2023 107 70 - 125 mg/dL Final     Urea Nitrogen   Date Value Ref Range Status   12/25/2023 30.9 (H) 8.0 - 23.0 mg/dL Final   12/13/2022 31 (H) 7 - 30 mg/dL Final   06/23/2021 30 7 - 30 mg/dL Final     Creatinine   Date Value Ref  Range Status   12/25/2023 0.86 0.51 - 0.95 mg/dL Final   06/23/2021 1.29 (H) 0.52 - 1.04 mg/dL Final     GFR Estimate   Date Value Ref Range Status   12/25/2023 67 >60 mL/min/1.73m2 Final   06/23/2021 39 (L) >60 mL/min/[1.73_m2] Final     Comment:     Non  GFR Calc  Starting 12/18/2018, serum creatinine based estimated GFR (eGFR) will be   calculated using the Chronic Kidney Disease Epidemiology Collaboration   (CKD-EPI) equation.       Calcium   Date Value Ref Range Status   12/25/2023 8.8 8.8 - 10.2 mg/dL Final   06/23/2021 9.2 8.5 - 10.1 mg/dL Final     Bilirubin Total   Date Value Ref Range Status   12/25/2023 0.5 <=1.2 mg/dL Final   04/04/2019 1.0 0.2 - 1.3 mg/dL Final     Alkaline Phosphatase   Date Value Ref Range Status   12/25/2023 141 40 - 150 U/L Final     Comment:     Reference intervals for this test were updated on 11/14/2023 to more accurately reflect our healthy population. There may be differences in the flagging of prior results with similar values performed with this method. Interpretation of those prior results can be made in the context of the updated reference intervals.   04/04/2019 104 40 - 150 U/L Final     ALT   Date Value Ref Range Status   12/25/2023 14 0 - 50 U/L Final     Comment:     Reference intervals for this test were updated on 6/12/2023 to more accurately reflect our healthy population. There may be differences in the flagging of prior results with similar values performed with this method. Interpretation of those prior results can be made in the context of the updated reference intervals.     06/14/2021 22 0 - 50 U/L Final     AST   Date Value Ref Range Status   12/25/2023 19 0 - 45 U/L Final     Comment:     Reference intervals for this test were updated on 6/12/2023 to more accurately reflect our healthy population. There may be differences in the flagging of prior results with similar values performed with this method. Interpretation of those prior results can  be made in the context of the updated reference intervals.   04/04/2019 27 0 - 45 U/L Final

## 2023-12-27 NOTE — PLAN OF CARE
Problem: Pain Acute  Goal: Optimal Pain Control and Function  Intervention: Prevent or Manage Pain  Recent Flowsheet Documentation  Taken 12/27/2023 0200 by Piyush Gaitan RN  Medication Review/Management: medications reviewed     Problem: Adult Inpatient Plan of Care  Goal: Optimal Comfort and Wellbeing  Intervention: Monitor Pain and Promote Comfort  Recent Flowsheet Documentation  Taken 12/26/2023 2326 by Piyush Gaitan RN  Pain Management Interventions: medication (see MAR)   Goal Outcome Evaluation:    Pt exhibited anxiety, restlessness, and c/o right ear pain 8/10 x 2.  Prn Atarax, tylenol, and oxycodone were given as ordered, with minimal effect per patient's report.  Pt slept intermittently during the night.  Vitals were stable.

## 2023-12-27 NOTE — PLAN OF CARE
Problem: Artificial Airway  Goal: Effective Communication  Outcome: Progressing  Goal: Optimal Device Function  Outcome: Progressing  Goal: Absence of Device-Related Skin or Tissue Injury  Outcome: Progressing   Goal Outcome Evaluation:       RT PROGRESS NOTE     DATA:     CURRENT SETTINGS: AC 18, 390, +5, 30%             TRACH TYPE / SIZE: # 6 BIVONA TTS changed on 12/22/23             MODE: AC                                    FIO2:30%     ACTION:             THERAPIES: Albuterol  TID, Mucomyst BID , MetaNeb BID             SUCTION:                           FREQUENCY:X 4                        AMOUNT: small                          CONSISTENCY:thick                        COLOR: pale yellow              SPONTANEOUS COUGH EFFORT/STRENGTH OF EFFORT (not elicited by suctioning): fair                               WEANING PHASE: 2 and 3                         WEAN MODE: Trach capped and TM/PMV                        WEAN TIME: 2 HRS 25 MIN (Capped), 40%40 LPM /TM/PMV Since 14:25                         END WEAN REASON:Ongoing      RESPONSE:             BS:  Clear               VITAL SIGNS: Sat 96-97%, HR 63-65, RR 18-24             EMOTIONAL NEEDS / CONCERNS: no                RISK FOR SELF DECANNULATION:  no                        RISK DUE TO:                          INTERVENTION/S IN PLACE IS/ARE:         NOTE / PLAN:  Cont. Current plan of care / Patient was in distress this morning , complain of difficulty breathing , Placed back on VENT for about 4hrs and Capped on 2LNC for 2 hrs 25 min ,Patient complained of difficulty breathing while capping, uncapped patient placed on TM/PMV @14:15, Patient to go on VENT night time

## 2023-12-27 NOTE — PROGRESS NOTES
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 Admitted for elective TVR completed on 11/28/2023. Subsequently had respiratory arrest on 12/6 with short CPR prior to ROSC. Treated for presumed pneumonia with multiple courses of abx, now off.  S/p trach on 12/13. Fluid overloaded treated with diuretics. Transferred to LTAC 12/15. Liberated from the vent on 12/20.   Assessment/Plan:     Problems:  Acute hypercapnic and hypoxic respiratory failure s/p trach:  RLL/RML atelectasis and small R pleural effusion on imaging  Tracheostomy in place: 6 Bivona 12/22  Dysphagia s/p NJ: passed BDT. VFSS 12/21 without aspiration but deep laryngeal penetration with thin, remains NPO per speech   Respiratory/cardiac arrest on 12/6 possibly d/t mucus plugging.  Multiple rib fx d/t CPR  S/p TVR, HFpEF, LE edema on diuretic: torsemide switched to bumex     Attempted capping thru the night. VBG this am with worsening hypercapnic resp failure, 7.31/76.  More labored breathing and placed back on the vent.  Follow up VBG with improvement, pCO2 down to 65 after a couple hrs.  Now capping. Suspect acidosis is fluid related given afebrile and elevated BNP 5228, up from 10d ago(3710), but down still from 2 wk ago(51057). Continues to have significant edema in legs.  CxR with minimal atelectasis at the right lung base. Lungs otherwise appear clear, possible component of pulmonary edema.       Recommendations:  Start phase 3: Cap day.  AC night   Agree with increasing bumex   Strict I/Os, daily wts.  Consider coello for accurate output, pt wants to hold for now  Repeat VBG in am   Nebulized albuterol TID, mucomyst & metaneb BID   GI ppx: famotidine   DVT ppx: coumadin   Routine trach care per protocol   PT/OT/SLP  "    Subjective:   - in chair with trach capped on 2L NC.  Denies sob currently but does seem mildly labored when voicing.  Dtr and  at bedside, all questions answered.     Tracheal secretions:  Overnight -  2x, small, thick   Yesterday - 2x, small, thick     Cough strength: strong    Ventilator weaning results  - continuous TM 12/20-26  - 12/26: continuous capping     Clinical status discussed today with respiratory therapist       Medications:      dextrose      - MEDICATION INSTRUCTIONS -        acetylcysteine  2 mL Nebulization BID    albuterol  2.5 mg Nebulization 3 times daily    aspirin  81 mg Oral or Feeding Tube Daily    bumetanide  1.5 mg Intravenous Q12H    escitalopram  10 mg Oral or Feeding Tube Daily    famotidine  20 mg Oral or Feeding Tube Daily    loperamide  2 mg Per Feeding Tube BID 09 12    Melatonin  5 mg Per Feeding Tube QPM    miconazole   Topical BID    mirtazapine  15 mg Oral or Feeding Tube At Bedtime    multivitamins w/minerals  15 mL Per Feeding Tube Daily    potassium chloride  20 mEq Oral or Feeding Tube BID    protein modular  1 packet Per Feeding Tube Daily    saccharomyces boulardii  250 mg Oral or Feeding Tube BID    sodium chloride (PF)  10 mL Intracatheter Q8H    Warfarin Therapy Reminder  1 each Oral See Admin Instructions         Exam/Data:   Vitals  BP (!) 159/67 (BP Location: Left arm)   Pulse 64   Temp 98.2  F (36.8  C) (Oral)   Resp 22   Ht 1.613 m (5' 3.5\")   Wt 76.6 kg (168 lb 14 oz)   SpO2 96%   BMI 29.45 kg/m       I/O last 3 completed shifts:  In: 1100 [I.V.:30; NG/GT:1070]  Out: -   Weight change: 0.623 kg (1 lb 6 oz)    Vent Mode: CMV/AC  (Continuous Mandatory Ventilation/ Assist Control)  FiO2 (%): 30 %  Resp Rate (Set): 18 breaths/min  Tidal Volume (Set, mL): 390 mL  PEEP (cm H2O): 5 cmH2O  Resp: 22      EXAM:  Gen: NAD in chair with trach capped   HEENT: NG tube in place, trach midline/intact, no stridor   CV: RRR, no m/g/r  Resp: diminished b/l; " non-labored, no wheeze, no crackles  Abd: soft, nontender  Skin: b/l lower extremity rash, large bulla on right dorsum  Ext: 2-3+ b/l lower extremity edema  Neuro: alert, follows commands     Labs:  Venous Blood Gas  Recent Labs   Lab 12/27/23  1007 12/27/23  0553 12/21/23  0542   PHV 7.37 7.31* 7.42   PCO2V 65* 76* 55*   PO2V 31 33 29   HCO3V 34* 33* 33*   SHAWN 11.9 11.7 11.1       Complete Blood Count   Recent Labs   Lab 12/27/23  1007 12/27/23  0553 12/25/23  0537 12/23/23  0623 12/22/23  0528 12/21/23  0542 12/21/23  0534   WBC  --   --  5.0 5.6 6.3  --  5.8   HGB 8.1* 8.5* 7.8* 8.0* 8.2*   < > 7.6*   PLT  --   --  185 221 255  --  245    < > = values in this interval not displayed.     Basic Metabolic Panel  Recent Labs   Lab 12/27/23  1007 12/27/23  0553 12/27/23  0550 12/26/23  0557 12/25/23  0537 12/24/23  0656 12/23/23  1215 12/23/23  0623 12/22/23  1157 12/22/23  0528 12/21/23  0542 12/21/23  0534    141  --   --  138  --   --  140  --  139   < > 141   POTASSIUM 5.3* 4.8 5.0 3.8 3.9   < >  --  4.0  --  4.2   < > 4.2   CHLORIDE  --   --   --   --  95*  --   --  97*  --  97*  --  99   CO2  --   --   --   --  36*  --   --  35*  --  34*  --  33*   BUN  --   --   --   --  30.9*  --   --  28.8*  --  27.4*  --  30.6*   CR  --   --   --   --  0.86  --   --  0.86  --  0.81  --  0.82    167*  --   --  128*  --  120* 116*   < > 109*   < > 114*    < > = values in this interval not displayed.     Liver Function Tests  Recent Labs   Lab 12/27/23  0550 12/26/23  0557 12/25/23  0537 12/24/23  0656 12/23/23  0623 12/22/23  0528 12/21/23  0534   AST  --   --  19  --  23 25 23   ALT  --   --  14  --  18 18 20   ALKPHOS  --   --  141  --  154* 153* 144   BILITOTAL  --   --  0.5  --  0.6 0.7 0.6   ALBUMIN  --   --  3.2*  --  3.2* 3.1* 3.0*   INR 2.91* 2.63* 2.66* 2.04* 1.69* 1.73* 1.53*     Coagulation Profile  Recent Labs   Lab 12/27/23  0550 12/26/23  0557 12/25/23  0537 12/24/23  0656   INR 2.91* 2.63* 2.66*  2.04*       Radiology: Personally reviewed; radiology read below    XR CHEST 12/27/2023  Tracheostomy. Sternotomy and valve replacement. Enteric tube within the stomach. Right upper extremity PICC line tip in the distal SVC. Heart is mildly enlarged, unchanged. Elevation of the right hemidiaphragm, unchanged. Minimal atelectasis   at the right lung base. Lungs otherwise appear clear    XR CHEST 12/19/2023  Poststernotomy changes with a prosthetic TVR and tracheostomy tube tube in place. Feeding tube can be seen coursing into the stomach. Right upper extremity PICC line catheter overlies the proximal right atrium. Elevation of the right hemidiaphragm, unchanged with small right effusion and right basilar and likely middle lobe opacities, presumed atelectasis. Left lung is clear. Heart is slightly enlarged. No signs of failure.    Matt Kennedy CNP  Pulmonary Medicine  [facility]  Pager [phone]  Office [phone]

## 2023-12-27 NOTE — PROGRESS NOTES
Care Management Follow Up    Length of Stay (days): 12    Expected Discharge Date: 01/02/2024     Concerns to be Addressed: other (see comments)  no concerns identified today  Patient plan of care discussed at interdisciplinary rounds: Yes    Anticipated Discharge Disposition:       Anticipated Discharge Services:    Anticipated Discharge DME:      Patient/family educated on Medicare website which has current facility and service quality ratings:    Education Provided on the Discharge Plan:    Patient/Family in Agreement with the Plan:      Referrals Placed by CM/SW:    Private pay costs discussed: Not applicable    Additional Information:  Patient discussed at IDT rounds yesterday.  She was doing well with tube feeds per RD.  Video swallow test was done last week and patient remains NPO per ST.  Patient was working with with PT - ambulating 250 ' with stand by assist.  Per nursing, she has a blister on her right foot.  Per Pulm NP, patient was doing well with trach dome and considering decannulation, but this am patient's VBG was 7.31 and she is back on vent for now.    Caroline Willett RN, BSN  Care Coordination  [facility]  [phone]

## 2023-12-27 NOTE — PLAN OF CARE
Problem: Enteral Nutrition  Goal: Feeding Tolerance  Outcome: Not Progressing   Goal Outcome Evaluation:       Pt continues to have loose stools (c.diff positive) seems a little better. She has lots of gas.  Pt declines banatrol tf.  Will continue to monitor.

## 2023-12-27 NOTE — PLAN OF CARE
Problem: Artificial Airway  Goal: Effective Communication  Outcome: Progressing  Goal: Optimal Device Function  Outcome: Progressing  Goal: Absence of Device-Related Skin or Tissue Injury  Outcome: Progressing       RT PROGRESS NOTE     DATA:     CURRENT SETTINGS: AC/18/390/+5/30% (back up setting)             TRACH TYPE / SIZE:  #6 Bivona changed on 12/22/23             MODE:  Trach capped             FIO2:   2 lpm NC     ACTION:             THERAPIES:   ALB TID/MUCOMYST BID/ MET NEB BID             SUCTION:                           FREQUENCY:   X2                        AMOUNT:   Small                        CONSISTENCY:   Thick                        COLOR:   Pale/yellow             SPONTANEOUS COUGH EFFORT/STRENGTH OF EFFORT (not elicited by suctioning): Yes:strong                              WEANING PHASE:   #3                        WEAN MODE:    Trach capped/ 2 lpm NC as marbella                        WEAN TIME:   20 hrs 29 min                        END WEAN REASON:  Cont      RESPONSE:             BS:   Clear/diminish             VITAL SIGNS:   SAT 97-99%, HR 62-82 , RR 20-24             EMOTIONAL NEEDS / CONCERNS:  N/A                RISK FOR SELF DECANNULATION:  N/A                        RISK DUE TO:                          INTERVENTION/S IN PLACE IS/ARE:  N/A       NOTE / PLAN:   Pt was on trach capped with 2- 3 lpm nc through the night, marbella well.Pt has been placed back to 30% and 30 lpm tm/pmv due to hi CO2 result 70 on VBG. Recommending Trach cap days and tm/pmv at night    VBG 12/27 05:53 am 7.31/76/33/33/56.6%

## 2023-12-27 NOTE — CONSULTS
[facility] Nurse Inpatient Assessment     Consulted for: Right foot     Patient History (according to provider note(s):      Per H+P:   83 year old female with a PMHx of HFpEF, severe TR, permanent AF s/p AVN ablation with PPM implant 1/11/19 s/p extraction TV (transvenous) PPM and implant leadless PPM 7/31/23, emergent aortic dissection repair 1/4/19, HTN, CKD, NEDA, and obesity who presented to [facility] as an outpatient for elective TVR done on 11/28/2023.  Immediate postoperative course was complicated by acute hypoxic and hypercapnic respiratory failure requiring BiPAP and probable pneumonia and treated with 7 days of IV Zosyn, patient was transferred to surgical telemetry floor on 12/2/2020.  On 12/6/2023 she was transferred back to CVICU after RRT for acute hypoxic respiratory distress and subsequent respiratory arrest with short CPR, re-intubation, and ROSC achieved. She sustained mildly displaced and nondisplaced right 3-5 rib fractures with associated right chest wall hematoma and nondisplaced anterolateral left 4th and 5th rib fractures. She was treated with meropenem 7 day course. She also has hx of recent C diff and multiple loose stools, for which she completed a 7 day po vancomycin course (per antimicrobial stewardship team, more likely to be colonization with recent history and no apparent treatment). Patient was transiently hyperglycemic and treated with insulin infusion then transitioned to sliding scale insulin per protocol. Blood sugars remained stable, has not required insulin coverage.  She remained intubated in the CVICU until undergoing tracheostomy by Dr. Pitts on 12/13/23, doing well with trach dome and PS currently.  She was treated for volume overload with diuretics (Bumex and chlorothiazide) with subsequent hypernatremia and hypotension requiring FWF and pressor support.  Spironolactone, torsemide and metoprolol discontinued and midodrine started with now stable  blood pressures. Pre-op weight 70.9 kg, discharge weight 74.1 kg (163 lbs) on 12/15/2023.  [facility] continuing vent weaning, therapies.     Assessment:      Areas visualized during today's visit:  Right foot    Wound location: Right dorsal foot    Last photo: 12/27   Wound due to:  Edema  Wound history/plan of care: Blister noted by nursing several days ago  Wound base: Partially unroofed serous blister related to edema     Palpation of the wound bed: boggy      Drainage: scant     Description of drainage: serous     Measurements (length x width x depth, in cm): 4 x 6cm     Tunneling: N/A     Undermining: N/A  Periwound skin: Intact      Color: pink      Temperature: cold  Odor: none  Pain: denies   Treatment goal:  Reabsorb/heal  STATUS: initial assessment  Supplies ordered: supplies stored on unit       Treatment Plan:     Cover blister with sponge gauze and wrap with kerlix, change daily and as needed to contain drainage    Orders: Written    RECOMMEND PRIMARY TEAM ORDER: None, at this time  Education provided: plan of care  Discussed plan of care with: Patient and Nurse  WOC nurse follow-up plan: weekly  Notify WOC if wound(s) deteriorate.  Nursing to notify the Provider(s) and re-consult the WOC Nurse if new skin concern.    DATA:     Current support surface: Standard  Standard gel/foam mattress (IsoFlex, Atmos air, etc)  Containment of urine/stool: Brief  BMI: Body mass index is 29.45 kg/m .   Active diet order: Orders Placed This Encounter      NPO for Medical/Clinical Reasons Except for: Ice Chips, NPO but receiving Tube Feeding     Output: I/O last 3 completed shifts:  In: 1100 [I.V.:30; NG/GT:1070]  Out: -      Labs:   Recent Labs   Lab 12/27/23  1007 12/27/23  0553 12/27/23  0550 12/26/23  0557 12/25/23  0537   ALBUMIN  --   --   --   --  3.2*   HGB 8.1*   < >  --   --  7.8*   INR  --   --  2.91*   < > 2.66*   WBC  --   --   --   --  5.0    < > = values in this  interval not displayed.     Pressure injury risk assessment:   Sensory Perception: 4-->no impairment  Moisture: 3-->occasionally moist  Activity: 2-->chairfast  Mobility: 3-->slightly limited  Nutrition: 3-->adequate  Friction and Shear: 2-->potential problem  Jean-Claude Score: 17    AYSE GuallpaN, RN, PHN, HNB-BC, CWOCN  Pager no longer is use, please contact through [employer] group: [facility] [employer]

## 2023-12-27 NOTE — PLAN OF CARE
Goal Outcome Evaluation:      Plan of Care Reviewed With: patient    Overall Patient Progress: no changeOverall Patient Progress: no change           Problem: Enteral Nutrition  Goal: Absence of Aspiration Signs and Symptoms  Outcome: Progressing     Problem: Anxiety Signs/Symptoms  Goal: Improved Mood Symptoms (Anxiety Signs/Symptoms)  Intervention: Optimize Emotion and Mood  Recent Flowsheet Documentation  Taken 12/27/2023 0850 by Radha Mesa RN  Supportive Measures: active listening utilized     Problem: Diarrhea  Goal: Effective Diarrhea Management  Outcome: Progressing  Intervention: Manage Diarrhea  Recent Flowsheet Documentation  Taken 12/27/2023 0850 by Radha Mesa RN  Isolation Precautions:   contact precautions maintained   enteric precautions maintained  Medication Review/Management: medications reviewed     Patient is alert and oriented. Patient c/o difficulty breathing this morning. Patient was place back to vent. Chest x-ray  done and Bumex increased. Prn Atarax and Zofran utilized this shift. Uses bedside commode. Tube feeding continuous per orders. Patient current on trach dome, appears to be comfortable .  K 5.3 today, Attending Provider is aware. Vitals stable. NJ tube clogged this evening, Bicarbonate and lipase utilized and was able to unclog it. Family visited this shift.

## 2023-12-27 NOTE — PROGRESS NOTES
"CLINICAL NUTRITION SERVICES - REASSESSMENT NOTE     Nutrition Prescription    RECOMMENDATIONS FOR MDs/PROVIDERS TO ORDER:      Malnutrition Status:    Severe in context of acute illness    Recommendations already ordered by Registered Dietitian (RD):  No change to TF orders today.    Future/Additional Recommendations:  Will monitor tolerance - may try semi elemental formula if pt continues to have multiple loose stools daily.  Will continue multivitamin with minerals until skin is healed.     EVALUATION OF THE PROGRESS TOWARD GOALS   Diet: NPO, except for ice chips  Nutrition Support: Jevity 1.5 at 50 mL/hr x 22 hrs per ND tube (hold 1 hr before and after warfarin) with 30 mL water every 4 hours.   (Formula changed to higher fiber 12/22/23 - was on Osmolite 1.5 with 4 banatrol tf per day prior)  Provides:1100ml/day) + 1 pkt Prosource TF20 provides: 1730 kcals, 90 g PRO, 836 ml free H20, 238 g CHO, and 23 g fiber daily.  836 mL free water + 240 mL water flushes = 1076 mL water/day     NEW FINDINGS   Pt reports tube feeding causes her to poop.  She feels like slightly less lately.  Pt does not want Banatrol added to TF.   Osmolite 1.5 currently running, Nursing informed and will change formula to Jevity 1.5.    Ventilator weaning Phase 2  WOC consulted for perineal irritation    ANTHROPOMETRICS  Height: 161.3 cm (5' 3.5\")  Admit weight: 73.5 kg ((162 lb 0.6 oz) 12/12/23 bed scale  Most Recent Weight: 76.6 kg (168 lb 14 oz)  bed scale  12/27/23    BMI: Overweight BMI 25-29.9   29.45  Weights generally between 75 - 76 kg this admission    Dosing Weight: 58.9 kg adjusted weight     ASSESSED NUTRITION NEEDS  Estimated Energy Needs: 8418-2097 kcals/day (25 - 30 kcals/kg)  Justification: Maintenance  Estimated Protein Needs: 70-88 grams protein/day (1.2 - 1.5 grams of pro/kg)  Justification: Hypercatabolism with acute illness  Estimated Fluid Needs: 4526-8628 mL/day (1 mL/kcal)   Justification: Maintenance    PHYSICAL " FINDINGS  Per chart  2+ dependent edema  Skin - surgical incision noted rt upper flank, pea sized vaginal wound, blister on foot    GI  Loose stools daily (c.diff positive) 5 stools yesterday, 2 recorded so far today  ND tube placed 12/8/23    LABS  Reviewed  K 4,8 and 5.3 H  Mg 2.6 H  Phos 4.9 H  BNP 5228 H  Glucose 167, 107    MEDICATIONS  Reviewed  Bumex  Pepcid  Imodium  Remeron  Multivitamin with minerals  Kcl  Florastar  Warfarin  K, Mg, Phos replacement protocols    MALNUTRITION:  % Weight Loss:  None noted  % Intake:  No decreased intake noted (tube feeding meeting needs - however pt with chronic diarrhea)  Subcutaneous Fat Loss: facial region:  Moderate, Upper arm:  moderate, and Lower arm:  mild - per 12/13 RD note   Muscle Loss:    moderate, Facial & jaw region:  mild, Upper arm (bicep, tricep):  moderate, Lower arm  (forearm):  mild, Dorsal hand:  moderate, and Posterior calf:  moderate - Per 12/13 RD note   Fluid Retention:  2+    Malnutrition Diagnosis: Severe malnutrition  In Context of:  Acute illness or injury    Previous Goals   Meet > 75% of estimated nutrition needs - met  No weight loss below 69 kg - met  <3 loose BM/day - not met    PREVIOUS/CURRENT NUTRITION DIAGNOSIS  Inadequate oral intake related to mechanical ventilation as evidenced by NPO, reliance on TF and trach    INTERVENTIONS  Implementation  Continue current nutrition RX    Goals  Meet > 75% of estimated nutrition needs  No weight loss below 69 kg  <3 loose BM/day    Monitoring/Evaluation  Progress toward goals will be monitored and evaluated per protocol.

## 2023-12-28 NOTE — PROGRESS NOTES
[facility]    Medicine Progress Note - Hospitalist Service    Date of Admission:  12/15/2023    Brief summary:  Priya Funez is a 83 year old female with a PMHx of HFpEF, severe TR, permanent AF s/p AVN ablation with PPM implant 1/11/19 s/p extraction TV (transvenous) PPM and implant leadless PPM 7/31/23, emergent aortic dissection repair 1/4/19, HTN, CKD, NEDA, and obesity who presented to [facility] as an outpatient for elective TVR done on 11/28/2023.  Immediate postoperative course was complicated by acute hypoxic and hypercapnic respiratory failure requiring BiPAP and probable pneumonia and treated with 7 days of IV Zosyn, patient was transferred to surgical telemetry floor on 12/2/2020.  On 12/6/2023 she was transferred back to CVICU after RRT for acute hypoxic respiratory distress and subsequent respiratory arrest with short CPR, re-intubation, and ROSC achieved. She sustained mildly displaced and nondisplaced right 3-5 rib fractures with associated right chest wall hematoma and nondisplaced anterolateral left 4th and 5th rib fractures. She was treated with meropenem 7 day course. She also has hx of recent C diff and multiple loose stools, for which she completed a 7 day po vancomycin course (per antimicrobial stewardship team, more likely to be colonization with recent history and no apparent treatment). Patient was transiently hyperglycemic and treated with insulin infusion then transitioned to sliding scale insulin per protocol. Blood sugars remained stable, has not required insulin coverage.  She remained intubated in the CVICU until undergoing tracheostomy by Dr. Pitts on 12/13/23, doing well with trach dome and PS currently.  She was treated for volume overload with diuretics (Bumex and chlorothiazide) with subsequent hypernatremia and hypotension requiring FWF and pressor support.  Spironolactone, torsemide and metoprolol discontinued and midodrine started with now stable blood pressures. Pre-op weight 70.9 kg,  discharge weight 74.1 kg (163 lbs) on 12/15/2023.  Patient was discharged to LTACH 12/15/2023 for continuing vent weaning, therapies      LTACH course:  12/17: Vitals stable.  Scheduled midodrine discontinued with BP stable in the 140s.  Diuretic therapy with torsemide restarted given peripheral edema and volume status, however at lower dose of 10 mg daily (home dose 40 mg daily) titrate slowly based on BP response.  Resume metoprolol for A-fib rate control as well gradually.  Labs reviewed, stable electrolytes, creatinine 0.82. ProBNP 3710, down from 34974 and 50377.  Hgb 8.0 from 7.6.  INR 0.98 from 1.12.  Continue heparin and Coumadin together for now until INR therapeutic.  Started hydroxyzine for anxiety 12/17.  A.m. labs ordered.       12/18-12/24:  increased torsemide to 20 mg po daily due to peripheral edema on 12/18, having loose stools,  changed tube feeds to higher fiber formula and add imodium daily and prn.  Still with edema,  increase toresemide to 40 mg po daily on 12/19 and increased imodium to bid and prn, changed heparin to lovenox for pt comfort.  She is getting prn hydroxyzine for anxiety which we should try to titrate off as anticoholinergics are not optimal in the geriatric population. 12/22 switched to bumex 1 mg daily and increased to bumex 1 mg BID 12/23.  Encouraged leg elevation above heart. Give hydrochlorothiazide one dose 12/24 - if that doesn't help then would need to try IV diuretics.  INR above 2 for the first time on 12/24 so if above 2 on 12/25 then could stop the lovenox.    12/25: Continues to have lower extremity edema.  Still diuresing.  On vent weaning phase 2.  Not making much progress.  12/26: Patient's INR is 2.63 today, plan to discontinue Lovenox. Has bilateral rash in lower extremities,will give a try of hydrocortisone cream. No new other changes, continue with current medical management  12/27.  Patient VBG revealed hypercapnia this morning.  She had to be put back on  the vent.  Chest x-ray currently pending.  Increase Bumex IV from 1 mg twice daily to Bumex 1.5 mg IV twice daily.  Plan to continue diuresing if blood pressure allows may increase to Bumex 2 mg twice daily  12/28.  Much better today compared to yesterday.  Diuresing well.  On face to vent weans TM/PMV during the day and AC at night.    Assessment & Plan   -No new changes at this time.  Continue current medical management.  Dermatitis, acute  -Improving  -Erythematous rash on bilateral lower extremities  -Continue hydrocortisone cream 2.5%, apply to affected areas twice daily    Acute respiratory failure and arrest on 12/6/23   Acute hypercapnic and hypoxic respiratory failure requiring BiPAP  s/p tracheostomy 12/13/23  s/p Mechanical ventilation  Aspiration pneumonitis vs pneumonia  Possible aspiration event 12/1  Near complete plugging of left lower lobe on CT   Acute mildly displaced and nondisplaced right 3-5 rib fractures with associated right chest wall hematoma and nondisplaced anterolateral left fourth and fifth rib fractures  -Pulmonology consulted for vent weaning.   -RT consult for tracheostomy cares, oxygenation, nebs, pulmonary therapies  - Tracheostomy sutures to be removed POD#5 (12/18/2023)  - Mucomyst, duoneb w/ RT Q4H   - SLP speaking valve as tolerated      S/p respiratory arrest 12/6  Hx of severe TR s/p TVR  HFpEF  Hx of Afib s/p AVN ablation - PPM, upgraded to leadless 7/2023  Hypertension  Hyperlipidemia  Hx of aortic dissection repair 2019  Respiratory arrest 12/6   - Stable paced rhythm with underlying atrial flutter. On review of cardiology, underlying aflutter/afib has been present since at least June of this year. Device RN adjusted PPM to PTA settings rate 60 at [facility].   - Pre-op 9/8/23 echo: LVEF 55/60%, mildly dilated RV, normal function  - Echo 12/4 with LVEF 60-65%, normal RV function, TV mean gradient 5 mmHg, no TR  - Echo 12/7 with no significant change from previous with the  exception of mild reduction of RV. RV free wall dyskinesia.   - ASA 81 mg daily  -was on scheduled midodrine 10 mg Q8H but stopped that 12/16/23 since BP in the 140s systolic and stable  -Continue midodrine 5 mg TID PRN for SBP<100   - Started Warfarin 12/15.    -Discontinued lovenox 12/26/2023. Continue with coumadin INR on goal.Pharmacy consulted for Coumadin dosing (INR goal 2-3)      Acute on chronic diastolic heart failure with Elevated BNP, improving   - Held PTA spironolactone, torsemide, metoprolol due to hypotension at prior hospital  - Echo 12/4 with LVEF 60-65%, normal RV function, TV mean gradient 5 mmHg, no TR  - Echo 12/7 with no significant change from previous with the exception of mild reduction of RV. RV free wall dyskinesia.   - FLUID STATUS: Pre-op weight 70.9 kg, discharge weight 74.1 kg (163 lbs) on 12/15/2023 and 165 lbs on 12/16  -increased torsemide to 40 mg po daily on 12/19 then switched to bumex 12/22 and increased to bumex 1 mg BID on 12/23  -proBNP trend appears improved from a peak of 10950 to now 3710.  -has a new blister on her right foot likely due to fluid retention  -She received 1 dose of hydrochlorothiazide 12/24/2023.  This seems to have helped in diuresing.    -Increase Bumex IV from 1 mg twice daily to Bumex 1.5 mg twice daily.  Consider titrating up if BP allows    Bullous/blister on top of Right foot  - solitary - likely due to fluid retention but if worsens may need to consider alternative etiology.  Would not unroof, hopefully over time will resorb.  Doubt bullous pemphigoid given its a solitary lesion.    Multiple loose stools   Hx of C. Difficile (9/2023)  Severe malnutrition in the context of acute illness  - Nutrition consulted for continuing tube feedings- will change to higher fiber tube feeds - on 12/18  - NJT and enteral feeds at goal (45 mL/hr)   - SLP: NPO  - PPI famotidine  - Bowel regimen discontinued due to continued loose stools  - Finished vancomycin po 7  day course   - Continue Banatrol  - PRN Zofran  4 mg PO or IV Q6H  - PRN Compazine 5 mg IV Q6H   - loperamide 2 mg BID and prn     Hypernatremia resolved  Hypokalemia, resolved   CKD stage 3  Lactic acidosis, resolved  IMAN on CKD, resolved   Edema  Baseline creat ~1.2-1.3   FLUID STATUS: Pre-op weight 70.9 kg, discharge weight 74.1 kg (163 lbs).   - Elevated sodium, Na 151 --> 147 on recheck, likely 2/2 aggressive diureses S/p bumex and chlorotiazide prior to transfer  -On free water flushes 200 mL every 4 hourly on 12/16. Fluid up and normal Na so decreased to 30ml q6h and switched her to bumex 12/22  - I/O has been inaccurate due to unmeasured stools and voids.  - Replete lytes per protocol  - Avoid/limit nephrotoxins as able  - Strict I/O, daily weights, avoid/limit nephrotoxins     Stress induced hyperglycemia  T2DM  Hgb A1c 5.7%, stable off meds  - PTA Jardiance on hold, resume once medically optimized   - Goal BG <180 for optimal healing     Stress induced leukocytosis  Probable aspiration pneumonitis vs pneumonia, resolved  - 11/30 Zosyn 7-day course for pneumonia (finished)   - 12/6 Given further decompensation, Vanco and meropenem was started on 12/6   - Finished meropenem 7 day course (12/12)   - Finished Vancomycin PO 7 day course (for c-diff) (12/13)  - Blood culture with NGTD   - Urine with Candida and sputum with Yeast, likely both colonizations.   - Urine culture with NGTD     Hx of recent C. Difficile  - C. Difficile toxin PCR positive 12/3 with negative antigen and negative toxin - per antimicrobial stewardship team, more likely to be colonization with recent history and no apparent treatment  - Finished oral vancomycin course on 12/13, still with diarrhea but no abd pain, leucocytosis, or fevers so monitoring for now  - Monitor fever curve, WBC, and inflammatory markers as appropriate  - Enteric precautions per infection prevention protocol      3rd, 4th right rib fractures due to CPR  -Continue  "mini thoracotomy precautions  -Judicious use of narcotics, de-escalate as able     Acute blood loss anemia  - Hgb has been stable.   -Hgb 7.6 on 12/16 and 8.0 on 12/17/2023  -Iron studies: Serum iron 29, iron binding capacity 216, saturation index 13%.  -Follow CBC with weekly labs.   -Transfuse if Hgb<7     Acute postoperative pain  - PRN: Tylenol, oxycodone     Hx of general anxiety disorder  - PTA Lexapro and Remeron  - Scheduled Melatonin HS      Perineal irritation    - WOC consulted    Diet: Adult Formula Drip Feeding: Continuous Jevity 1.5; Nasojejunal; Goal Rate: 50; mL/hr; TF to run x22 hours holding 1 hr before/after warfarin  NPO for Medical/Clinical Reasons Except for: Ice Chips, NPO but receiving Tube Feeding    DVT Prophylaxis: Enoxaparin (Lovenox) subcutaneous transitioning to warfarin   Barrientos Catheter: Not present  Lines: PRESENT      PICC 12/07/23 Triple Lumen Right Basilic ok to use PICC-Site Assessment: WDL      Cardiac Monitoring: None  Code Status: Full Code      Clinically Significant Risk Factors          # Hypocalcemia: Lowest iCa = 1.19 mg/dL in last 2 days, will monitor and replace as appropriate     # Hypoalbuminemia: Lowest albumin = 2.7 g/dL at 12/18/2023  5:17 AM, will monitor as appropriate     # Hypertension: Noted on problem list    # Acute heart failure with preserved ejection fraction: heart failure noted on problem list, last echo with EF >50%, and receiving IV diuretics       # Overweight: Estimated body mass index is 29.45 kg/m  as calculated from the following:    Height as of this encounter: 1.613 m (5' 3.5\").    Weight as of this encounter: 76.6 kg (168 lb 14 oz).   # Severe Malnutrition: based on nutrition assessment      # Financial/Environmental Concerns: none   # Pacemaker present  # History of CABG: noted on surgical history       Disposition Plan      Expected Discharge Date: 01/03/2024      Destination: home with family;foster/protective services            Torsten " MD Lupe  Hospitalist Service  LTACH  Securely message with [employer] (more info)  Text page via [facility] Paging/Directory   ______________________________________________________________________    Interval History   Patient is resting in a chair at her bedside comfortably.  She states she feels better today.  Has been diuresing well overnight.  No new complaints at this time.  No new events overnight per RN    Physical Exam   Vital Signs: Temp: 98.1  F (36.7  C) Temp src: Oral BP: 134/65 Pulse: 63   Resp: 24 SpO2: 96 % O2 Device: Trach dome Oxygen Delivery: 40 LPM  Weight: 168 lbs 13.96 oz    GENERAL: She is a well grown well-developed adult female resting in bed comfortably.  She appears in no distress.  HEENT: Head is normocephalic atraumatic eyes pupils appear equal round and reactive to light.  NECK: Supple, Tracheostomy in place  NOSE: NJ tube in place  LUNGS: Mild rhonchi bilaterally  HEART: S1 S2, Rate and rhythm is regular. No murmurs, 2+ BLE edema  ABDOMEN: Soft, nontender, no distension. Bowel sounds are positive. No guarding or rebound.   EXTREMITIES: 2-3+ bilateral lower extremity edema noted   SKIN: large 3-4 cm fluid filled sac on dorsum right foot, no erythema or drainage   PSYCHIATRIC: Normal affect and cognition     Medical Decision Making       45 MINUTES SPENT BY ME on the date of service doing chart review, history, exam, documentation & further activities per the note.      Data   Lab Results   Component Value Date    WBC 5.0 12/25/2023    WBC 5.3 06/14/2021     Lab Results   Component Value Date    RBC 2.67 12/25/2023    RBC 4.33 06/14/2021     Lab Results   Component Value Date    HGB 7.8 12/25/2023    HGB 12.8 06/14/2021     Lab Results   Component Value Date    HCT 26.1 12/25/2023    HCT 40.1 06/14/2021     Lab Results   Component Value Date    MCV 98 12/25/2023    MCV 93 06/14/2021     Lab Results   Component Value Date    MCH 29.2 12/25/2023    [facility] 29.6 06/14/2021     Lab Results    Component Value Date    MCHC 29.9 12/25/2023    MCHC 31.9 06/14/2021     Lab Results   Component Value Date    RDW 18.1 12/25/2023    RDW 13.5 06/14/2021     Lab Results   Component Value Date     12/25/2023     06/14/2021       Last Comprehensive Metabolic Panel:  Sodium   Date Value Ref Range Status   12/28/2023 142 135 - 145 mmol/L Final     Comment:     Reference intervals for this test were updated on 09/26/2023 to more accurately reflect our healthy population. There may be differences in the flagging of prior results with similar values performed with this method. Interpretation of those prior results can be made in the context of the updated reference intervals.    06/23/2021 138 133 - 144 mmol/L Final     Potassium   Date Value Ref Range Status   12/28/2023 4.5 3.4 - 5.3 mmol/L Final   12/13/2022 3.8 3.4 - 5.3 mmol/L Final   06/23/2021 4.5 3.4 - 5.3 mmol/L Final     Potassium POCT   Date Value Ref Range Status   12/27/2023 5.3 (H) 3.5 - 5.0 mmol/L Final     Chloride   Date Value Ref Range Status   12/28/2023 98 98 - 107 mmol/L Final   12/13/2022 99 94 - 109 mmol/L Final   06/23/2021 104 94 - 109 mmol/L Final     Carbon Dioxide   Date Value Ref Range Status   06/23/2021 31 20 - 32 mmol/L Final     Carbon Dioxide (CO2)   Date Value Ref Range Status   12/28/2023 37 (H) 22 - 29 mmol/L Final   12/13/2022 33 (H) 20 - 32 mmol/L Final     Anion Gap   Date Value Ref Range Status   12/28/2023 7 7 - 15 mmol/L Final   12/13/2022 7 3 - 14 mmol/L Final   06/23/2021 3 3 - 14 mmol/L Final     Glucose   Date Value Ref Range Status   12/28/2023 124 (H) 70 - 99 mg/dL Final   12/13/2022 84 70 - 99 mg/dL Final   06/23/2021 86 70 - 99 mg/dL Final     GLUCOSE BY METER POCT   Date Value Ref Range Status   12/23/2023 120 (H) 70 - 99 mg/dL Final     Glucose Whole Blood POCT   Date Value Ref Range Status   12/27/2023 107 70 - 125 mg/dL Final     Urea Nitrogen   Date Value Ref Range Status   12/28/2023 40.1 (H) 8.0 -  23.0 mg/dL Final   12/13/2022 31 (H) 7 - 30 mg/dL Final   06/23/2021 30 7 - 30 mg/dL Final     Creatinine   Date Value Ref Range Status   12/28/2023 1.11 (H) 0.51 - 0.95 mg/dL Final   06/23/2021 1.29 (H) 0.52 - 1.04 mg/dL Final     GFR Estimate   Date Value Ref Range Status   12/28/2023 50 (L) >60 mL/min/1.73m2 Final   06/23/2021 39 (L) >60 mL/min/[1.73_m2] Final     Comment:     Non  GFR Calc  Starting 12/18/2018, serum creatinine based estimated GFR (eGFR) will be   calculated using the Chronic Kidney Disease Epidemiology Collaboration   (CKD-EPI) equation.       Calcium   Date Value Ref Range Status   12/28/2023 8.8 8.8 - 10.2 mg/dL Final   06/23/2021 9.2 8.5 - 10.1 mg/dL Final     Bilirubin Total   Date Value Ref Range Status   12/28/2023 0.7 <=1.2 mg/dL Final   04/04/2019 1.0 0.2 - 1.3 mg/dL Final     Alkaline Phosphatase   Date Value Ref Range Status   12/28/2023 140 40 - 150 U/L Final     Comment:     Reference intervals for this test were updated on 11/14/2023 to more accurately reflect our healthy population. There may be differences in the flagging of prior results with similar values performed with this method. Interpretation of those prior results can be made in the context of the updated reference intervals.   04/04/2019 104 40 - 150 U/L Final     ALT   Date Value Ref Range Status   12/28/2023 12 0 - 50 U/L Final     Comment:     Reference intervals for this test were updated on 6/12/2023 to more accurately reflect our healthy population. There may be differences in the flagging of prior results with similar values performed with this method. Interpretation of those prior results can be made in the context of the updated reference intervals.     06/14/2021 22 0 - 50 U/L Final     AST   Date Value Ref Range Status   12/28/2023 20 0 - 45 U/L Final     Comment:     Reference intervals for this test were updated on 6/12/2023 to more accurately reflect our healthy population. There may be  differences in the flagging of prior results with similar values performed with this method. Interpretation of those prior results can be made in the context of the updated reference intervals.   04/04/2019 27 0 - 45 U/L Final

## 2023-12-28 NOTE — PLAN OF CARE
Problem: Mechanical Ventilation Invasive  Goal: Effective Communication  Outcome: Progressing  Goal: Optimal Device Function  Outcome: Progressing  Intervention: Optimize Device Care and Function  Recent Flowsheet Documentation  Taken 12/28/2023 1513 by Radha Goodrich, RT  Airway Safety Measures: all equipment/monitors on and audible  Goal: Mechanical Ventilation Liberation  Outcome: Progressing  Goal: Absence of Device-Related Skin and Tissue Injury  Outcome: Progressing  Goal: Absence of Ventilator-Induced Lung Injury  Outcome: Progressing

## 2023-12-28 NOTE — PLAN OF CARE
Problem: Pain Acute  Goal: Optimal Pain Control and Function  Intervention: Prevent or Manage Pain  Recent Flowsheet Documentation  Taken 12/28/2023 1029 by Becky Harris RN  Sensory Stimulation Regulation: care clustered  Bowel Elimination Promotion: ambulation promoted  Medication Review/Management: medications reviewed  Intervention: Optimize Psychosocial Wellbeing  Recent Flowsheet Documentation  Taken 12/28/2023 1029 by Becky Harris RN  Supportive Measures: active listening utilized     Problem: Mechanical Ventilation Invasive  Goal: Effective Communication  Intervention: Ensure Effective Communication  Recent Flowsheet Documentation  Taken 12/28/2023 1029 by Becky Harris RN  Communication Enhancement Strategies: call light answered in person  Family/Support System Care: presence promoted  Trust Relationship/Rapport:   care explained   choices provided   questions answered   reassurance provided   thoughts/feelings acknowledged  Goal: Optimal Device Function  Intervention: Optimize Device Care and Function  Recent Flowsheet Documentation  Taken 12/28/2023 1029 by Becky Harris RN  Airway Safety Measures: all equipment/monitors on and audible  Airway/Ventilation Management: airway patency maintained  Goal: Mechanical Ventilation Liberation  Intervention: Promote Extubation and Mechanical Ventilation Liberation  Recent Flowsheet Documentation  Taken 12/28/2023 1029 by Becky Harris RN  Medication Review/Management: medications reviewed  Environmental Support:   calm environment promoted   comfort object encouraged  Goal: Absence of Device-Related Skin and Tissue Injury  Intervention: Maintain Skin and Tissue Health  Recent Flowsheet Documentation  Taken 12/28/2023 1029 by Becky Harris RN  Device Skin Pressure Protection: absorbent pad utilized/changed     Problem: Anxiety Signs/Symptoms  Goal: Optimized Energy Level (Anxiety Signs/Symptoms)  Outcome: Progressing  Goal:  Improved Mood Symptoms (Anxiety Signs/Symptoms)  Intervention: Optimize Emotion and Mood  Recent Flowsheet Documentation  Taken 12/28/2023 1029 by Becky Harris RN  Supportive Measures: active listening utilized  Goal: Enhanced Social, Occupational or Functional Skills (Anxiety Signs/Symptoms)  Intervention: Promote Social, Occupational and Functional Ability  Recent Flowsheet Documentation  Taken 12/28/2023 1029 by Becky Harris RN  Social Functional Ability Promotion: autonomy promoted  Trust Relationship/Rapport:   care explained   choices provided   questions answered   reassurance provided   thoughts/feelings acknowledged     Problem: Diarrhea  Goal: Effective Diarrhea Management  Outcome: Progressing  Intervention: Manage Diarrhea  Recent Flowsheet Documentation  Taken 12/28/2023 1029 by Becky Harris RN  Isolation Precautions: contact precautions maintained  Medication Review/Management: medications reviewed    Pt had loose BM X 2, continuous on imodium scheduled. Pt on vent weaning phase 2, trach dome, tolerated well. Pt c/o anxiety at the beginning of the shift, atarax 25 mg given @0900 with effect. Family visiting at bed side, pt's status updated. Pt on mg, potassium and phosphorus protocol, see result. Pt up in recliner chair most of the shift. No respiratory distress noted, will continue monitoring.

## 2023-12-28 NOTE — PROGRESS NOTES
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 Admitted for elective TVR completed on 11/28/2023. Subsequently had respiratory arrest on 12/6 with short CPR prior to ROSC. Treated for presumed pneumonia with multiple courses of abx, now off.  S/p trach on 12/13. C/b by fluid overload treated with diuretics. Transferred to LTAC 12/15. Liberated from the vent on 12/20.  Attempted capping 12/26 and worsening hypercapnia next morning, placed back on vent.  Suspect d/t worsening volume overload, diuretics increased.   Assessment/Plan:     Problems:  Acute hypercapnic and hypoxic respiratory failure s/p trach: CxR 12/27 with minimal atelectasis at the right lung base. Lungs otherwise appear clear, possible component of pulmonary edema.  RLL/RML atelectasis and small R pleural effusion: atelectasis improved and pleural effusion resolved on CxR 12/27  Tracheostomy in place: 6 Bivona 12/22  Dysphagia s/p NJ: passed BDT. VFSS 12/21 without aspiration but deep laryngeal penetration with thin, remains NPO per speech   Respiratory/cardiac arrest on 12/6 possibly d/t mucus plugging.  Multiple rib fx d/t CPR  S/p TVR, HFpEF(55-60%), LE edema: torsemide switched to IV bumex: elevated BNP  GI ppx: famotidine  Anticoagulation with coumadin for A-fib and bioprosthetic heart valve. INR supratherapeutic  Anxiety: impacts respiratory drive at times     Capped 12/26. Worsening hypercapnic resp failure 12/27 am, back on vent. Suspect d/t volume overload, diuretics increased. Capped later for a couple hrs but +dyspnea, so placed on TM/PMV with improvement.  AC night. Same issue with capping this am, but doing well on TM/PMV.     Recommendations:  Phase 2: TM/PMV day.  AC night   Continue with IV bumex: edema a little  better today.  Wts flat.  Obtain standing wts please.  I/Os not accurate as incontinent. Pt would like to avoid a coello. Monitor renal function, sCR and BUN uptrending. Daily BMP for now  Repeat VBG this am improved: 7.43/57  De escalate pulm hygiene as secretion burden is small.  Albuterol neb BID. Stop scheduled mucomyst & metaneb  Routine trach care per protocol   PT/OT/SLP  Possible VFSS next week  INR dosing per pharm     Subjective:   - in chair on TM/PMV.  Feels better today.  Had a much better night on vent.  Feels her leg edema is improved.  No current complaints, denies dyspnea, pain, n/v, anxiety     Tracheal secretions:  Overnight -  1x, small, thick   Yesterday - 4x, small, thick     Cough strength: strong    Ventilator weaning results  - continuous TM 12/20-26  - 12/26: continuous capping   - 12/27: VBG am 7.31/76, placed back on vent.  Capped later ~2hr, dyspnea, improved on TM/PMV.  AC night     Clinical status discussed today with respiratory therapist       Medications:      dextrose      - MEDICATION INSTRUCTIONS -        albuterol  2.5 mg Nebulization 2 times daily    aspirin  81 mg Oral or Feeding Tube Daily    bumetanide  1.5 mg Intravenous Q12H    escitalopram  10 mg Oral or Feeding Tube Daily    famotidine  20 mg Oral or Feeding Tube Daily    loperamide  2 mg Per Feeding Tube BID 09 12    Melatonin  5 mg Per Feeding Tube QPM    miconazole   Topical BID    mirtazapine  15 mg Oral or Feeding Tube At Bedtime    multivitamins w/minerals  15 mL Per Feeding Tube Daily    [Held by provider] potassium chloride  20 mEq Oral or Feeding Tube BID    protein modular  1 packet Per Feeding Tube Daily    saccharomyces boulardii  250 mg Oral or Feeding Tube BID    sodium chloride (PF)  10 mL Intracatheter Q8H    warfarin ANTICOAGULANT  0.5 mg Per Feeding Tube ONCE at 18:00    Warfarin Therapy Reminder  1 each Oral See Admin Instructions         Exam/Data:   Vitals  /57 (BP Location: Left arm, Patient  "Position: Supine, Cuff Size: Adult Regular)   Pulse 61   Temp 98.1  F (36.7  C) (Oral)   Resp 26   Ht 1.613 m (5' 3.5\")   Wt 76.6 kg (168 lb 14 oz)   SpO2 92%   BMI 29.45 kg/m       I/O last 3 completed shifts:  In: 1037 [I.V.:30; NG/GT:1007]  Out: -   Weight change: 0 kg (0 lb)    Vent Mode: CMV/AC  (Continuous Mandatory Ventilation/ Assist Control)  FiO2 (%): 50 %  Resp Rate (Set): 18 breaths/min  Tidal Volume (Set, mL): 390 mL  PEEP (cm H2O): 5 cmH2O  Resp: 26      EXAM:  Gen: NAD in chair on TM/PMV  HEENT: NG tube in place, trach midline/intact, no stridor   CV: RRR, no m/g/r  Resp: CTAB with faint crackles LLL; non-labored, no wheeze  Abd: soft, nontender  Skin: b/l lower extremity rash(improved), large bulla on right dorsum(wrapped)  Ext: 2-3+ b/l lower extremity edema(slightly improved)  Neuro: alert, follows commands     Labs:  Venous Blood Gas  Recent Labs   Lab 12/28/23  0724 12/27/23  1007 12/27/23  0553   PHV 7.43 7.37 7.31*   PCO2V 57* 65* 76*   PO2V 31 31 33   HCO3V 38* 34* 33*   SHAWN 13.6 11.9 11.7       Complete Blood Count   Recent Labs   Lab 12/28/23  0604 12/27/23  1007 12/27/23  0553 12/25/23  0537 12/23/23  0623 12/22/23  0528   WBC 6.1  --   --  5.0 5.6 6.3   HGB 7.8* 8.1* 8.5* 7.8* 8.0* 8.2*     --   --  185 221 255     Basic Metabolic Panel  Recent Labs   Lab 12/28/23  0604 12/27/23  1007 12/27/23  0553 12/27/23  0550 12/26/23  0557 12/25/23  0537 12/23/23  1215 12/23/23  0623 12/22/23  1157 12/22/23  0528    142 141  --   --  138  --  140  --  139   POTASSIUM 4.5 5.3* 4.8 5.0   < > 3.9   < > 4.0  --  4.2   CHLORIDE 98  --   --   --   --  95*  --  97*  --  97*   CO2 37*  --   --   --   --  36*  --  35*  --  34*   BUN 40.1*  --   --   --   --  30.9*  --  28.8*  --  27.4*   CR 1.11*  --   --   --   --  0.86  --  0.86  --  0.81   * 107 167*  --   --  128*   < > 116*   < > 109*    < > = values in this interval not displayed.     Liver Function Tests  Recent Labs   Lab " 12/28/23  0604 12/27/23  0550 12/26/23  0557 12/25/23  0537 12/24/23  0656 12/23/23  0623 12/22/23  0528   AST 20  --   --  19  --  23 25   ALT 12  --   --  14  --  18 18   ALKPHOS 140  --   --  141  --  154* 153*   BILITOTAL 0.7  --   --  0.5  --  0.6 0.7   ALBUMIN 3.1*  --   --  3.2*  --  3.2* 3.1*   INR 3.45* 2.91* 2.63* 2.66*   < > 1.69* 1.73*    < > = values in this interval not displayed.     Coagulation Profile  Recent Labs   Lab 12/28/23  0604 12/27/23  0550 12/26/23  0557 12/25/23  0537   INR 3.45* 2.91* 2.63* 2.66*       Radiology: Personally reviewed; radiology read below    XR CHEST 12/27/2023  Tracheostomy. Sternotomy and valve replacement. Enteric tube within the stomach. Right upper extremity PICC line tip in the distal SVC. Heart is mildly enlarged, unchanged. Elevation of the right hemidiaphragm, unchanged. Minimal atelectasis   at the right lung base. Lungs otherwise appear clear    XR CHEST 12/19/2023  Poststernotomy changes with a prosthetic TVR and tracheostomy tube tube in place. Feeding tube can be seen coursing into the stomach. Right upper extremity PICC line catheter overlies the proximal right atrium. Elevation of the right hemidiaphragm, unchanged with small right effusion and right basilar and likely middle lobe opacities, presumed atelectasis. Left lung is clear. Heart is slightly enlarged. No signs of failure.    Matt Kennedy, TATIANA  Pulmonary Medicine  [facility]  Pager [phone]  Office [phone]

## 2023-12-28 NOTE — PLAN OF CARE
7 PM to 7 AM RT PROGRESS NOTE     DATA:     CURRENT SETTINGS:             TRACH TYPE / SIZE:  6 Bivona changed 12/22/23             MODE:   AC 18, 390, +5, 25%     ACTION:             THERAPIES:   TID Albuterol neb and BID Mucomyst neb given via BID [place] neb in [facility] @ 10 x 5 minutes and CPEP @ 10 x 5 minutes.               SUCTION:                           FREQUENCY:  X 1                         AMOUNT:  small                         CONSISTENCY:  thick                         COLOR:  white/yellow             SPONTANEOUS COUGH EFFORT/STRENGTH OF EFFORT (not elicited by suctioning): Good                              WEANING PHASE:   Cap days as tolerated, AC at night                        WEAN MODE:    40% @ 40 LPM HFTM/PMV                        WEAN TIME:   1415 - 2210, for 7 hours and 55 minutes. Pt denied SOB or WOB                        END WEAN REASON:   HS     RESPONSE:             BS:   Clear and dim to CS and dim on right, clear and dim on the left post sxn             VITAL SIGNS:   SATs 97-99%, HR 60-62, RR 18-20             RISK FOR SELF DECANNULATION:  No    NOTE / PLAN:   Pt observed x 3 not breathing above dialed RR of 18. Did a short trial of dialed in RR of 14 to see if pt breath more spontaneously. Pt spontaneous RR 15 or above on RR 14. See flowsheet at [id number] for details.     VBG in am. Continue with same.      Problem: Artificial Airway  Goal: Effective Communication  Outcome: Progressing  Goal: Optimal Device Function  Outcome: Progressing  Intervention: Optimize Device Care and Function  Recent Flowsheet Documentation  Taken 12/27/2023 2215 by Rosalind Garcia, RT  Airway Safety Measures:   all equipment/monitors on and audible   manual resuscitator/mask/valve in room  Taken 12/27/2023 2203 by Rosalind Garcia, RT  Airway Safety Measures:   all equipment/monitors on and audible   manual resuscitator/mask/valve in room  Taken 12/27/2023 2021 by Rosalind Garcia, RT  Airway  Safety Measures:   all equipment/monitors on and audible   manual resuscitator/mask/valve in room  Goal: Absence of Device-Related Skin or Tissue Injury  Outcome: Progressing

## 2023-12-28 NOTE — PROGRESS NOTES
CLINICAL NUTRITION SERVICES NOTE    Diet: NPO  TF: Jevity 1.5 is running at 50 mL/hr x 22 hrs, off for 1 hr hold before and after warfarin.  Meets estimated nutrition needs.  Visited pt and spouse in room. Pt reports she is stooling less today.  Pt says she plans on eating by next week.    Pt is doing her swallowing exercises regularly.  Will continue to monitor.

## 2023-12-28 NOTE — PROGRESS NOTES
RT PROGRESS NOTE     DATA:     CURRENT SETTINGS:             TRACH TYPE / SIZE:  #6 Bivona (placed 12/22)             MODE:   TM/PMV             FIO2:   40%/40L     ACTION:             THERAPIES:   Albuterol BID             SUCTION:                           FREQUENCY:   x1                        AMOUNT:   Scant                        CONSISTENCY:   Thick/Thin                        COLOR:   Pale Yellow             SPONTANEOUS COUGH EFFORT/STRENGTH OF EFFORT (not elicited by suctioning): Strong Spontaneous Cough                           WEANING PHASE:   Phase 2                        WEAN MODE:    40%/40L TM/PMV, Capped 3L NC                        WEAN TIME:   8:45am, 20 minutes                        END WEAN REASON:   Still Weaning, SOB     RESPONSE:             BS:   Clear/Diminished             VITAL SIGNS:   Sating 92-97%, HR 60-82, RR 20-26             EMOTIONAL NEEDS / CONCERNS:  NA                RISK FOR SELF DECANNULATION:  No     NOTE / PLAN:   TM/PMV during the day and AC at night.  Full vent settings are AC 14, 400, +5, 25%.  VBG this morning was 7.43, 57.  RT will continue to monitor.

## 2023-12-28 NOTE — PLAN OF CARE
Problem: Adult Inpatient Plan of Care  Goal: Optimal Comfort and Wellbeing  Outcome: Progressing  Intervention: Provide Person-Centered Care  Recent Flowsheet Documentation  Taken 12/28/2023 0132 by Piyush Gaitan, RN  Trust Relationship/Rapport:   care explained   choices provided   Goal Outcome Evaluation:    Pt had an uneventful night.  Denies pain and slept comfortably most of the night.  Vitals were stable.

## 2023-12-29 NOTE — PLAN OF CARE
Problem: Anxiety Signs/Symptoms  Goal: Improved Mood Symptoms (Anxiety Signs/Symptoms)  Outcome: Progressing  Intervention: Optimize Emotion and Mood  Recent Flowsheet Documentation  Taken 12/29/2023 0021 by Cathi Lu RN  Supportive Measures: active listening utilized     Problem: Skin Injury Risk Increased  Goal: Skin Health and Integrity  Outcome: Progressing  Intervention: Optimize Skin Protection  Recent Flowsheet Documentation  Taken 12/29/2023 0021 by Cathi Lu RN  Pressure Reduction Techniques: heels elevated off bed  Pressure Reduction Devices:   heel offloading device utilized   positioning supports utilized     Problem: Diarrhea  Goal: Effective Diarrhea Management  Outcome: Progressing  Intervention: Manage Diarrhea  Recent Flowsheet Documentation  Taken 12/29/2023 0021 by Cathi Lu RN  Fluid/Electrolyte Management: fluids provided  Isolation Precautions: contact precautions maintained  Medication Review/Management: medications reviewed   Goal Outcome Evaluation:    Patient appeared to rest well during most of shift, alert x 4, no change in alertness or LOC, vss, bumex adm per order, prn adm for pain and bloating with effectiveness, no anxiety noted or reported, up x 2 to use toilet, will continue to monitor.

## 2023-12-29 NOTE — PLAN OF CARE
Problem: Adult Inpatient Plan of Care  Goal: Plan of Care Review  Description: The Plan of Care Review/Shift note should be completed every shift.  The Outcome Evaluation is a brief statement about your assessment that the patient is improving, declining, or no change.  This information will be displayed automatically on your shift  note.  Outcome: Progressing  Flowsheets (Taken 12/28/2023 2350)  Plan of Care Reviewed With: patient  Overall Patient Progress: improving     Problem: Artificial Airway  Goal: Effective Communication  Outcome: Progressing  Intervention: Ensure Effective Communication  Recent Flowsheet Documentation  Taken 12/28/2023 1620 by Sandy Sauer RN  Communication Enhancement Strategies: call light answered in person  Family/Support System Care:   involvement promoted   support provided  Trust Relationship/Rapport:   care explained   choices provided  Goal: Optimal Device Function  Outcome: Progressing  Intervention: Optimize Device Care and Function  Recent Flowsheet Documentation  Taken 12/28/2023 1650 by Sandy Sauer RN  Airway Safety Measures: all equipment/monitors on and audible  Aspiration Precautions: NPO pending swallow screening/evaluation  Taken 12/28/2023 1628 by Sandy Sauer RN  Airway/Ventilation Management: airway patency maintained     Problem: Enteral Nutrition  Goal: Absence of Aspiration Signs and Symptoms  Outcome: Progressing  Intervention: Minimize Aspiration Risk  Recent Flowsheet Documentation  Taken 12/28/2023 1650 by Sandy Sauer RN  Enteral Feeding Safety: tubing labeled as enteral feeding  Goal: Safe, Effective Therapy Delivery  Outcome: Progressing  Intervention: Prevent Feeding-Related Adverse Events  Recent Flowsheet Documentation  Taken 12/28/2023 1650 by Sandy Sauer RN  Enteral Feeding Safety: tubing labeled as enteral feeding     Problem: Dysrhythmia  Goal: Normalized Cardiac Rhythm  Outcome:  Progressing  Intervention: Monitor and Manage Cardiac Rhythm Effect  Recent Flowsheet Documentation  Taken 12/28/2023 1650 by Sandy Sauer RN  Stabilization Measures: respiratory support increased     Problem: Anxiety Signs/Symptoms  Goal: Improved Mood Symptoms (Anxiety Signs/Symptoms)  Outcome: Progressing  Intervention: Optimize Emotion and Mood  Recent Flowsheet Documentation  Taken 12/28/2023 1620 by Sandy Sauer RN  Supportive Measures: active listening utilized     Problem: Diarrhea  Goal: Effective Diarrhea Management  Outcome: Progressing  Intervention: Manage Diarrhea  Recent Flowsheet Documentation  Taken 12/28/2023 1650 by Sandy Sauer RN  Fluid/Electrolyte Management: fluids provided  Isolation Precautions: contact precautions maintained  Medication Review/Management: medications reviewed     Problem: Mechanical Ventilation Invasive  Goal: Effective Communication  Outcome: Progressing  Intervention: Ensure Effective Communication  Recent Flowsheet Documentation  Taken 12/28/2023 1620 by Sandy Sauer RN  Communication Enhancement Strategies: call light answered in person  Family/Support System Care:   involvement promoted   support provided  Trust Relationship/Rapport:   care explained   choices provided  Goal: Optimal Device Function  Outcome: Progressing  Intervention: Optimize Device Care and Function  Recent Flowsheet Documentation  Taken 12/28/2023 1650 by Sandy Sauer RN  Airway Safety Measures: all equipment/monitors on and audible  Taken 12/28/2023 1628 by Sandy Sauer RN  Airway/Ventilation Management: airway patency maintained     Goal Outcome Evaluation:      Plan of Care Reviewed With: patient    Overall Patient Progress: improvingOverall Patient Progress: improving    Patient is alert and oriented X 4. Vital signs were stable on Trache dome  during daylights to early evening; then switched to full Vent support at [facility]. RT  and  Pulmonologist following.  She is currently on NPO  except some ice chips  allowed by Provider. She is on  tube feeding X 22 hours per day and scheduled water flushes to meet her nutritional  and hydration needs.  She complained of  trache site pain rated 5/10; PRN Tylenol 650 mg given at 2112. She also requested  for Atarax 25 mg for anxiety at the same time with good effect. She had  2 episodes of mixed urine /stool per bedside commode. Will keep monitoring patient's progress and safety.

## 2023-12-29 NOTE — PROGRESS NOTES
RT PROGRESS NOTE     DATA:     CURRENT SETTINGS:             TRACH TYPE / SIZE:  #6 Bivona (placed 12/22)             MODE:   TM/PMV             FIO2:   35%/40L     ACTION:             THERAPIES:   Albuterol BID             SUCTION:                           FREQUENCY:   x2                        AMOUNT:   Small                        CONSISTENCY:   Thick                        COLOR:   Pale Yellow             SPONTANEOUS COUGH EFFORT/STRENGTH OF EFFORT (not elicited by suctioning): Strong Spontaneous Cough                           WEANING PHASE:   Phase 2                        WEAN MODE:    35%/40L TM/PMV                        WEAN TIME:   7:14am                        END WEAN REASON:   Still Weaning     RESPONSE:             BS:   Coarse/Diminished             VITAL SIGNS:   Sating 95-99%, HR 60-67, RR 15-24             EMOTIONAL NEEDS / CONCERNS:  NA                RISK FOR SELF DECANNULATION:  No    NOTE / PLAN:   TM/PMV during the day and AC at night.  Full vent settings are AC 14, 400, +5, 30%.  RT will continue to monitor.

## 2023-12-29 NOTE — PLAN OF CARE
7 PM to 7 AM RT PROGRESS NOTE     DATA:     CURRENT SETTINGS:             TRACH TYPE / SIZE:  6 Bivona changed 12/22/23             MODE:   AC 14, 400, +5, 30%     ACTION:             THERAPIES:   BID Albuterol neb given               SUCTION:                           FREQUENCY:  X 2                        AMOUNT: mod                         CONSISTENCY:  thin/thick                         COLOR: pale yellow             SPONTANEOUS COUGH EFFORT/STRENGTH OF EFFORT (not elicited by suctioning): Good                              WEANING PHASE:   TM/PMV days as tolerated, AC at night                        WEAN MODE:    35% @ 40 LPM HFTM/PMV                        WEAN TIME:   0845 - 2215, for 13 hours and 30 minutes. Pt denied SOB or WOB                        END WEAN REASON:   HS     RESPONSE:             BS:   Clear and dim              VITAL SIGNS:   SATs 92-97%, HR 59-62, RR 16-22             RISK FOR SELF DECANNULATION:  No    NOTE / PLAN:   Continue with same.      Problem: Artificial Airway  Goal: Effective Communication  Outcome: Progressing  Goal: Optimal Device Function  Outcome: Progressing  Intervention: Optimize Device Care and Function  Recent Flowsheet Documentation  Taken 12/27/2023 2215 by Rosalind Garcia, RT  Airway Safety Measures:   all equipment/monitors on and audible   manual resuscitator/mask/valve in room  Taken 12/27/2023 2203 by Rosalind Garcia, RT  Airway Safety Measures:   all equipment/monitors on and audible   manual resuscitator/mask/valve in room  Taken 12/27/2023 2021 by Rosalind Garcia, RT  Airway Safety Measures:   all equipment/monitors on and audible   manual resuscitator/mask/valve in room  Goal: Absence of Device-Related Skin or Tissue Injury  Outcome: Progressing

## 2023-12-29 NOTE — PLAN OF CARE
Problem: Artificial Airway  Goal: Effective Communication  Outcome: Progressing  Goal: Optimal Device Function  Outcome: Progressing  Intervention: Optimize Device Care and Function  Recent Flowsheet Documentation  Taken 12/29/2023 1506 by Radha Goodrich RT  Airway Safety Measures: all equipment/monitors on and audible  Taken 12/29/2023 1144 by Radha Goodrich RT  Airway Safety Measures: all equipment/monitors on and audible  Taken 12/29/2023 0714 by Radha Goodrich RT  Airway Safety Measures: all equipment/monitors on and audible  Taken 12/29/2023 0702 by Radha Goodrich RT  Airway Safety Measures: all equipment/monitors on and audible  Goal: Absence of Device-Related Skin or Tissue Injury  Outcome: Progressing     Problem: Mechanical Ventilation Invasive  Goal: Effective Communication  Outcome: Progressing  Goal: Optimal Device Function  Outcome: Progressing  Intervention: Optimize Device Care and Function  Recent Flowsheet Documentation  Taken 12/29/2023 1506 by Radha Goodrich RT  Airway Safety Measures: all equipment/monitors on and audible  Taken 12/29/2023 1144 by Radha Goodrich RT  Airway Safety Measures: all equipment/monitors on and audible  Taken 12/29/2023 0714 by Radha Goodrich RT  Airway Safety Measures: all equipment/monitors on and audible  Taken 12/29/2023 0702 by Radha Goodrich RT  Airway Safety Measures: all equipment/monitors on and audible  Goal: Mechanical Ventilation Liberation  Outcome: Progressing  Goal: Absence of Device-Related Skin and Tissue Injury  Outcome: Progressing  Goal: Absence of Ventilator-Induced Lung Injury  Outcome: Progressing

## 2023-12-29 NOTE — PROGRESS NOTES
[facility]    Medicine Progress Note - Hospitalist Service    Date of Admission:  12/15/2023    Brief summary:  Priya Funez is a 83 year old female with a PMHx of HFpEF, severe TR, permanent AF s/p AVN ablation with PPM implant 1/11/19 s/p extraction TV (transvenous) PPM and implant leadless PPM 7/31/23, emergent aortic dissection repair 1/4/19, HTN, CKD, NEDA, and obesity who presented to [facility] as an outpatient for elective TVR done on 11/28/2023.  Immediate postoperative course was complicated by acute hypoxic and hypercapnic respiratory failure requiring BiPAP and probable pneumonia and treated with 7 days of IV Zosyn, patient was transferred to surgical telemetry floor on 12/2/2020.  On 12/6/2023 she was transferred back to CVICU after RRT for acute hypoxic respiratory distress and subsequent respiratory arrest with short CPR, re-intubation, and ROSC achieved. She sustained mildly displaced and nondisplaced right 3-5 rib fractures with associated right chest wall hematoma and nondisplaced anterolateral left 4th and 5th rib fractures. She was treated with meropenem 7 day course. She also has hx of recent C diff and multiple loose stools, for which she completed a 7 day po vancomycin course (per antimicrobial stewardship team, more likely to be colonization with recent history and no apparent treatment). Patient was transiently hyperglycemic and treated with insulin infusion then transitioned to sliding scale insulin per protocol. Blood sugars remained stable, has not required insulin coverage.  She remained intubated in the CVICU until undergoing tracheostomy by Dr. Pitts on 12/13/23, doing well with trach dome and PS currently.  She was treated for volume overload with diuretics (Bumex and chlorothiazide) with subsequent hypernatremia and hypotension requiring FWF and pressor support.  Spironolactone, torsemide and metoprolol discontinued and midodrine started with now stable blood pressures. Pre-op weight 70.9 kg,  discharge weight 74.1 kg (163 lbs) on 12/15/2023.  Patient was discharged to LTACH 12/15/2023 for continuing vent weaning, therapies      LTACH course:  12/17: Vitals stable.  Scheduled midodrine discontinued with BP stable in the 140s.  Diuretic therapy with torsemide restarted given peripheral edema and volume status, however at lower dose of 10 mg daily (home dose 40 mg daily) titrate slowly based on BP response.  Resume metoprolol for A-fib rate control as well gradually.  Labs reviewed, stable electrolytes, creatinine 0.82. ProBNP 3710, down from 41385 and 58174.  Hgb 8.0 from 7.6.  INR 0.98 from 1.12.  Continue heparin and Coumadin together for now until INR therapeutic.  Started hydroxyzine for anxiety 12/17.  A.m. labs ordered.       12/18-12/24:  increased torsemide to 20 mg po daily due to peripheral edema on 12/18, having loose stools,  changed tube feeds to higher fiber formula and add imodium daily and prn.  Still with edema,  increase toresemide to 40 mg po daily on 12/19 and increased imodium to bid and prn, changed heparin to lovenox for pt comfort.  She is getting prn hydroxyzine for anxiety which we should try to titrate off as anticoholinergics are not optimal in the geriatric population. 12/22 switched to bumex 1 mg daily and increased to bumex 1 mg BID 12/23.  Encouraged leg elevation above heart. Give hydrochlorothiazide one dose 12/24 - if that doesn't help then would need to try IV diuretics.  INR above 2 for the first time on 12/24 so if above 2 on 12/25 then could stop the lovenox.    12/25 - 12/29.  Had extensive lower extremity edema.  Switched Bumex to IV and then increased it from Bumex 1 mg twice daily to Bumex 1.5 mg twice daily.  Has been diuresing well.  Developed dermatitis on the lower extremities.  Started hydrocortisone cream.  12/27, VBG revealed hypercapnia she was put back on the mechanical ventilator with improvement.  12/29. Continues to diurese well. Plan to monitor kidney  function with creatinine.  She remains on vent weaning phase 2.  Making some progress.    Assessment & Plan     - Continue with current medical management      Acute respiratory failure and arrest on 12/6/23   Acute hypercapnic and hypoxic respiratory failure requiring BiPAP  s/p tracheostomy 12/13/23  s/p Mechanical ventilation  Aspiration pneumonitis vs pneumonia  Possible aspiration event 12/1  Near complete plugging of left lower lobe on CT   Acute mildly displaced and nondisplaced right 3-5 rib fractures with associated right chest wall hematoma and nondisplaced anterolateral left fourth and fifth rib fractures  -Vent weaning per pulmonology  -RT for tracheostomy cares, oxygenation, nebs, pulmonary therapies  - Tracheostomy sutures to be removed POD#5 (12/18/2023)  - Mucomyst, duoneb w/ RT Q4H   - SLP speaking valve as tolerated      S/p respiratory arrest 12/6  Hx of severe TR s/p TVR  HFpEF  Hx of Afib s/p AVN ablation - PPM, upgraded to leadless 7/2023  Hypertension  Hyperlipidemia  Hx of aortic dissection repair 2019  Respiratory arrest 12/6   - Stable paced rhythm with underlying atrial flutter. On review of cardiology, underlying aflutter/afib has been present since at least June of this year. Device RN adjusted PPM to PTA settings rate 60 at [facility].   - Pre-op 9/8/23 echo: LVEF 55/60%, mildly dilated RV, normal function  - Echo 12/4 with LVEF 60-65%, normal RV function, TV mean gradient 5 mmHg, no TR  - Echo 12/7 with no significant change from previous with the exception of mild reduction of RV. RV free wall dyskinesia.   - ASA 81 mg daily  -was on scheduled midodrine 10 mg Q8H but stopped that 12/16/23 since BP in the 140s systolic and stable  -Continue midodrine 5 mg TID PRN for SBP<100   - Started Warfarin 12/15.    -Discontinued lovenox 12/26/2023. Continue with coumadin INR on goal.Pharmacy consulted for Coumadin dosing (INR goal 2-3)      Acute on chronic diastolic heart failure with Elevated BNP,  improving   - Held PTA spironolactone, torsemide, metoprolol due to hypotension at prior hospital  - Echo 12/4 with LVEF 60-65%, normal RV function, TV mean gradient 5 mmHg, no TR  - Echo 12/7 with no significant change from previous with the exception of mild reduction of RV. RV free wall dyskinesia.   - FLUID STATUS: Pre-op weight 70.9 kg, discharge weight 74.1 kg (163 lbs) on 12/15/2023 and 165 lbs on 12/16  -increased torsemide to 40 mg po daily on 12/19 then switched to bumex 12/22 and increased to bumex 1 mg BID on 12/23  -proBNP trend appears improved from a peak of 01100 to now 3710.  -has a new blister on her right foot likely due to fluid retention  -She received 1 dose of hydrochlorothiazide 12/24/2023.  This seems to have helped in diuresing.    -Increase Bumex IV from 1 mg twice daily to Bumex 1.5 mg twice daily.  Consider titrating up if BP allows    Bullous/blister on top of Right foot  - solitary - likely due to fluid retention but if worsens may need to consider alternative etiology.  Would not unroof, hopefully over time will resorb.  Doubt bullous pemphigoid given its a solitary lesion.    Multiple loose stools   Hx of C. Difficile (9/2023)  Severe malnutrition in the context of acute illness  - Nutrition consulted for continuing tube feedings- will change to higher fiber tube feeds - on 12/18  - NJT and enteral feeds at goal (45 mL/hr)   - SLP: NPO  - PPI famotidine  - Bowel regimen discontinued due to continued loose stools  - Finished vancomycin po 7 day course   - Continue Banatrol  - PRN Zofran  4 mg PO or IV Q6H  - PRN Compazine 5 mg IV Q6H   - loperamide 2 mg BID and prn     Hypernatremia resolved  Hypokalemia, resolved   CKD stage 3  Lactic acidosis, resolved  IMAN on CKD, resolved   Edema  Baseline creat ~1.2-1.3   FLUID STATUS: Pre-op weight 70.9 kg, discharge weight 74.1 kg (163 lbs).   - Elevated sodium, Na 151 --> 147 on recheck, likely 2/2 aggressive diureses S/p bumex and  chlorotiazide prior to transfer  -On free water flushes 200 mL every 4 hourly on 12/16. Fluid up and normal Na so decreased to 30ml q6h and switched her to bumex 12/22  - I/O has been inaccurate due to unmeasured stools and voids.  - Replete lytes per protocol  - Avoid/limit nephrotoxins as able  - Strict I/O, daily weights, avoid/limit nephrotoxins     Stress induced hyperglycemia  T2DM  Hgb A1c 5.7%, stable off meds  - PTA Jardiance on hold, resume once medically optimized   - Goal BG <180 for optimal healing     Stress induced leukocytosis  Probable aspiration pneumonitis vs pneumonia, resolved  - 11/30 Zosyn 7-day course for pneumonia (finished)   - 12/6 Given further decompensation, Vanco and meropenem was started on 12/6   - Finished meropenem 7 day course (12/12)   - Finished Vancomycin PO 7 day course (for c-diff) (12/13)  - Blood culture with NGTD   - Urine with Candida and sputum with Yeast, likely both colonizations.   - Urine culture with NGTD     Hx of recent C. Difficile  - C. Difficile toxin PCR positive 12/3 with negative antigen and negative toxin - per antimicrobial stewardship team, more likely to be colonization with recent history and no apparent treatment  - Finished oral vancomycin course on 12/13, still with diarrhea but no abd pain, leucocytosis, or fevers so monitoring for now  - Monitor fever curve, WBC, and inflammatory markers as appropriate  - Enteric precautions per infection prevention protocol      3rd, 4th right rib fractures due to CPR  -Continue mini thoracotomy precautions  -Judicious use of narcotics, de-escalate as able     Acute blood loss anemia  - Hgb has been stable.   -Hgb 7.6 on 12/16 and 8.0 on 12/17/2023  -Iron studies: Serum iron 29, iron binding capacity 216, saturation index 13%.  -Follow CBC with weekly labs.   -Transfuse if Hgb<7    Dermatitis, acute  -Improving  -Erythematous rash on bilateral lower extremities  -Continue hydrocortisone cream 2.5%, apply to  "affected areas twice daily     Acute postoperative pain  - PRN: Tylenol, oxycodone     Hx of general anxiety disorder  - PTA Lexapro and Remeron  - Scheduled Melatonin HS      Perineal irritation    - WOC consulted    Diet: Adult Formula Drip Feeding: Continuous Jevity 1.5; Nasojejunal; Goal Rate: 50; mL/hr; TF to run x22 hours holding 1 hr before/after warfarin  NPO for Medical/Clinical Reasons Except for: Ice Chips, NPO but receiving Tube Feeding    DVT Prophylaxis: Enoxaparin (Lovenox) subcutaneous transitioning to warfarin   Barrientos Catheter: Not present  Lines: PRESENT      PICC 12/07/23 Triple Lumen Right Basilic ok to use PICC-Site Assessment: WDL      Cardiac Monitoring: None  Code Status: Full Code      Clinically Significant Risk Factors              # Hypoalbuminemia: Lowest albumin = 2.7 g/dL at 12/18/2023  5:17 AM, will monitor as appropriate     # Hypertension: Noted on problem list    # Acute heart failure with preserved ejection fraction: heart failure noted on problem list, last echo with EF >50%, and receiving IV diuretics       # Overweight: Estimated body mass index is 29.64 kg/m  as calculated from the following:    Height as of this encounter: 1.613 m (5' 3.5\").    Weight as of this encounter: 77.1 kg (170 lb).   # Severe Malnutrition: based on nutrition assessment      # Financial/Environmental Concerns: none   # Pacemaker present  # History of CABG: noted on surgical history       Disposition Plan     Expected Discharge Date: 01/03/2024      Destination: home with family;foster/protective services            Torsten Andrade MD  Hospitalist Service  LTACH  Securely message with [employer] (more info)  Text page via [employer] Paging/Directory   ______________________________________________________________________    Interval History   No new events overnight per RN.  Patient is resting in bed comfortably.  She states she feels okay.  Has been diuresing well overnight.  Dermatitis on lower extremities " improving.  She reports no new complaints at this time.    Physical Exam   Vital Signs: Temp: 98.2  F (36.8  C) Temp src: Oral BP: 111/55 Pulse: 63   Resp: 24 SpO2: 96 % O2 Device: Trach dome Oxygen Delivery: 40 LPM  Weight: 170 lbs 0 oz    GENERAL: She is a well grown well-developed adult female resting in bed comfortably.  She appears in no distress.  HEENT: Head is normocephalic atraumatic eyes pupils appear equal round and reactive to light.  NECK: Supple, Tracheostomy in place  NOSE: NJ tube in place  LUNGS: Mild rhonchi bilaterally  HEART: S1 S2, Rate and rhythm is regular. No murmurs, 2+ BLE edema  ABDOMEN: Soft, nontender, no distension. Bowel sounds are positive. No guarding or rebound.   EXTREMITIES: 2-3+ bilateral lower extremity edema noted   SKIN: large 3-4 cm fluid filled sac on dorsum right foot, no erythema or drainage   PSYCHIATRIC: Normal affect and cognition     Medical Decision Making       45 MINUTES SPENT BY ME on the date of service doing chart review, history, exam, documentation & further activities per the note.      Data   Lab Results   Component Value Date    WBC 6.1 12/28/2023    WBC 5.3 06/14/2021     Lab Results   Component Value Date    RBC 2.64 12/28/2023    RBC 4.33 06/14/2021     Lab Results   Component Value Date    HGB 7.8 12/28/2023    HGB 12.8 06/14/2021     Lab Results   Component Value Date    HCT 25.9 12/28/2023    HCT 40.1 06/14/2021     Lab Results   Component Value Date    MCV 98 12/28/2023    MCV 93 06/14/2021     Lab Results   Component Value Date    MCH 29.5 12/28/2023    MCH 29.6 06/14/2021     Lab Results   Component Value Date    MCHC 30.1 12/28/2023    MCHC 31.9 06/14/2021     Lab Results   Component Value Date    RDW 17.9 12/28/2023    RDW 13.5 06/14/2021     Lab Results   Component Value Date     12/28/2023     06/14/2021       Last Comprehensive Metabolic Panel:  Sodium   Date Value Ref Range Status   12/29/2023 144 135 - 145 mmol/L Final      Comment:     Reference intervals for this test were updated on 09/26/2023 to more accurately reflect our healthy population. There may be differences in the flagging of prior results with similar values performed with this method. Interpretation of those prior results can be made in the context of the updated reference intervals.    06/23/2021 138 133 - 144 mmol/L Final     Potassium   Date Value Ref Range Status   12/29/2023 4.0 3.4 - 5.3 mmol/L Final   12/13/2022 3.8 3.4 - 5.3 mmol/L Final   06/23/2021 4.5 3.4 - 5.3 mmol/L Final     Potassium POCT   Date Value Ref Range Status   12/27/2023 5.3 (H) 3.5 - 5.0 mmol/L Final     Chloride   Date Value Ref Range Status   12/29/2023 99 98 - 107 mmol/L Final   12/13/2022 99 94 - 109 mmol/L Final   06/23/2021 104 94 - 109 mmol/L Final     Carbon Dioxide   Date Value Ref Range Status   06/23/2021 31 20 - 32 mmol/L Final     Carbon Dioxide (CO2)   Date Value Ref Range Status   12/29/2023 38 (H) 22 - 29 mmol/L Final   12/13/2022 33 (H) 20 - 32 mmol/L Final     Anion Gap   Date Value Ref Range Status   12/29/2023 7 7 - 15 mmol/L Final   12/13/2022 7 3 - 14 mmol/L Final   06/23/2021 3 3 - 14 mmol/L Final     Glucose   Date Value Ref Range Status   12/29/2023 124 (H) 70 - 99 mg/dL Final   12/13/2022 84 70 - 99 mg/dL Final   06/23/2021 86 70 - 99 mg/dL Final     GLUCOSE BY METER POCT   Date Value Ref Range Status   12/23/2023 120 (H) 70 - 99 mg/dL Final     Glucose Whole Blood POCT   Date Value Ref Range Status   12/27/2023 107 70 - 125 mg/dL Final     Urea Nitrogen   Date Value Ref Range Status   12/29/2023 43.1 (H) 8.0 - 23.0 mg/dL Final   12/13/2022 31 (H) 7 - 30 mg/dL Final   06/23/2021 30 7 - 30 mg/dL Final     Creatinine   Date Value Ref Range Status   12/29/2023 1.13 (H) 0.51 - 0.95 mg/dL Final   06/23/2021 1.29 (H) 0.52 - 1.04 mg/dL Final     GFR Estimate   Date Value Ref Range Status   12/29/2023 49 (L) >60 mL/min/1.73m2 Final   06/23/2021 39 (L) >60 mL/min/[1.73_m2]  Final     Comment:     Non  GFR Calc  Starting 12/18/2018, serum creatinine based estimated GFR (eGFR) will be   calculated using the Chronic Kidney Disease Epidemiology Collaboration   (CKD-EPI) equation.       Calcium   Date Value Ref Range Status   12/29/2023 8.8 8.8 - 10.2 mg/dL Final   06/23/2021 9.2 8.5 - 10.1 mg/dL Final     Bilirubin Total   Date Value Ref Range Status   12/28/2023 0.7 <=1.2 mg/dL Final   04/04/2019 1.0 0.2 - 1.3 mg/dL Final     Alkaline Phosphatase   Date Value Ref Range Status   12/28/2023 140 40 - 150 U/L Final     Comment:     Reference intervals for this test were updated on 11/14/2023 to more accurately reflect our healthy population. There may be differences in the flagging of prior results with similar values performed with this method. Interpretation of those prior results can be made in the context of the updated reference intervals.   04/04/2019 104 40 - 150 U/L Final     ALT   Date Value Ref Range Status   12/28/2023 12 0 - 50 U/L Final     Comment:     Reference intervals for this test were updated on 6/12/2023 to more accurately reflect our healthy population. There may be differences in the flagging of prior results with similar values performed with this method. Interpretation of those prior results can be made in the context of the updated reference intervals.     06/14/2021 22 0 - 50 U/L Final     AST   Date Value Ref Range Status   12/28/2023 20 0 - 45 U/L Final     Comment:     Reference intervals for this test were updated on 6/12/2023 to more accurately reflect our healthy population. There may be differences in the flagging of prior results with similar values performed with this method. Interpretation of those prior results can be made in the context of the updated reference intervals.   04/04/2019 27 0 - 45 U/L Final

## 2023-12-29 NOTE — PLAN OF CARE
Problem: Adult Inpatient Plan of Care  Goal: Absence of Hospital-Acquired Illness or Injury  Intervention: Prevent Skin Injury  Recent Flowsheet Documentation  Taken 12/29/2023 1054 by Becky Harris RN  Device Skin Pressure Protection: absorbent pad utilized/changed     Problem: Pain Acute  Goal: Optimal Pain Control and Function  Intervention: Prevent or Manage Pain  Recent Flowsheet Documentation  Taken 12/29/2023 1054 by Bceky Harris RN  Sensory Stimulation Regulation: care clustered  Bowel Elimination Promotion: ambulation promoted  Medication Review/Management: medications reviewed  Intervention: Optimize Psychosocial Wellbeing  Recent Flowsheet Documentation  Taken 12/29/2023 1054 by Becky Harris RN  Supportive Measures: active listening utilized     Problem: Enteral Nutrition  Goal: Absence of Aspiration Signs and Symptoms  Outcome: Progressing  Intervention: Minimize Aspiration Risk  Recent Flowsheet Documentation  Taken 12/29/2023 1054 by Becky Harris RN  Enteral Feeding Safety: tubing labeled as enteral feeding  Goal: Safe, Effective Therapy Delivery  Intervention: Prevent Feeding-Related Adverse Events  Recent Flowsheet Documentation  Taken 12/29/2023 1054 by Becky Harris RN  Enteral Feeding Safety: tubing labeled as enteral feeding   Pt up in the chair X 2. Up to bed side commode X 2, loose BM. Denies pain or discomfort. Family visiting at bed side, very supportive of pt. Pt on trach dome, no respiratory distress noted, will continue monitoring.

## 2023-12-29 NOTE — PROGRESS NOTES
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 Admitted for elective TVR completed 11/28/2023. Subsequent respiratory arrest on 12/6. Completed multiple courses of antibiotics for presumed pneumonia  S/p trach 12/13.     Transferred to LTAC 12/15. Progressed to continuous trach dome 12/20-26. Attempted 24/7 capping 12/26 with worsening hypercapnia 12/27 placed back on vent.  Suspect d/t acute on chronic volume overload, diuretics increased. Now tolerating TM/PMV during the day and AC at night.    Assessment/Plan:     Problems:  Acute hypercapnic/hypoxic respiratory failure s/p trach: CxR 12/27 with minimal atelectasis at the right lung base. Lungs otherwise appear clear, possible component of pulmonary edema on diuretics   Tracheostomy in place: 6 Bivona 12/22  Dysphagia s/p NJ: passed BDT. Failed VFSS 12/21, remains NPO. Intermittent back pressure with PMV possibly d/t trach position vs transient mucus   Respiratory/cardiac arrest 12/6 possibly d/t mucus plugging  S/p TVR, HFpEF(55-60%), b/l leg edema: on IV bumex: elevated BNP but down overall form a couple wks ago. Wt up slightly today. I/Os not accurate, outputs not being recorded. Pt reluctant to have coello. No new resp complaints. Still with 3+ pitting b/l leg edema   Anticoagulation with coumadin for A-fib and bioprosthetic heart valve. INR supratherapeutic  Anxiety: impacts respiratory drive at times     Recommendations:  Phase 2: TM/PMV day.  AC night   Adjust diuretic as clinically indicated. Daily wts, please record outputs. Monitor renal function, sCR and BUN unchanged today. Daily BMP for now. Repeat BNP on Monday. May need to augment with thiazide +/- diamox.  Monitor Na and CO2 respectively.   PT consult for lymphedema wraps  "  Albuterol neb BID  Routine trach care per protocol   Possible VFSS next week    Subjective:   - in chair on TM/PMV.  Breathing feels fine, denies dyspnea, pain, n/v.  Nervous she will not do well on her video swallow next week, very eager to eat and drink.  Frustrated her leg edema is about the same.     Tracheal secretions:  Overnight -  2x, mod, thin/thick   Yesterday - 1x, scant    Cough strength: strong    Ventilator weaning results  - continuous TM 12/20-26  - 12/26: continuous capping   - 12/27: VBG am 7.31/76, placed back on vent.  Capped later ~2hr, dyspnea, improved on TM/PMV.  AC night   - 12/28: Capped 3L for 20 min; sob.  40L/35%TM/PMV for 13.5hr.  AC night     Clinical status discussed today with respiratory therapist       Medications:      dextrose      - MEDICATION INSTRUCTIONS -        albuterol  2.5 mg Nebulization 2 times daily    aspirin  81 mg Oral or Feeding Tube Daily    bumetanide  1.5 mg Intravenous Q12H    escitalopram  10 mg Oral or Feeding Tube Daily    famotidine  20 mg Oral or Feeding Tube Daily    loperamide  2 mg Per Feeding Tube BID 09 12    Melatonin  5 mg Per Feeding Tube QPM    miconazole   Topical BID    mirtazapine  15 mg Oral or Feeding Tube At Bedtime    multivitamins w/minerals  15 mL Per Feeding Tube Daily    [Held by provider] potassium chloride  20 mEq Oral or Feeding Tube BID    protein modular  1 packet Per Feeding Tube Daily    saccharomyces boulardii  250 mg Oral or Feeding Tube BID    sodium chloride (PF)  10 mL Intracatheter Q8H    Warfarin Therapy Reminder  1 each Oral See Admin Instructions         Exam/Data:   Vitals  /55 (BP Location: Left arm, Patient Position: Supine, Cuff Size: Adult Regular)   Pulse 60   Temp 98.2  F (36.8  C) (Oral)   Resp 20   Ht 1.613 m (5' 3.5\")   Wt 76.6 kg (168 lb 14 oz)   SpO2 95%   BMI 29.45 kg/m       I/O last 3 completed shifts:  In: 1413 [I.V.:30; NG/GT:1383]  Out: -   Weight change:     Vent Mode: CMV/AC  " (Continuous Mandatory Ventilation/ Assist Control)  FiO2 (%): 40 %  Resp Rate (Set): 14 breaths/min  Tidal Volume (Set, mL): 400 mL  PEEP (cm H2O): 5 cmH2O  Resp: 20      EXAM:  Gen: NAD in chair on TM/PMV  HEENT: NG tube in place, trach midline/intact, no stridor   CV: RRR, no m/g/r  Resp: CTAB with faint crackles LLL; non-labored, no wheeze  Abd: soft, nontender  Skin: b/l lower extremity rash(improved), large bulla on right dorsum(wrapped)  Ext: 3+ b/l lower extremity edema(slightly improved)  Neuro: alert, follows commands     Labs:  Venous Blood Gas  Recent Labs   Lab 12/28/23  0724 12/27/23  1007 12/27/23  0553   PHV 7.43 7.37 7.31*   PCO2V 57* 65* 76*   PO2V 31 31 33   HCO3V 38* 34* 33*   SHAWN 13.6 11.9 11.7       Complete Blood Count   Recent Labs   Lab 12/28/23  0604 12/27/23  1007 12/27/23  0553 12/25/23  0537 12/23/23  0623   WBC 6.1  --   --  5.0 5.6   HGB 7.8* 8.1* 8.5* 7.8* 8.0*     --   --  185 221     Basic Metabolic Panel  Recent Labs   Lab 12/29/23  0608 12/28/23  0604 12/27/23  1007 12/27/23  0553 12/26/23  0557 12/25/23  0537 12/23/23  1215 12/23/23  0623    142 142 141  --  138  --  140   POTASSIUM 4.0 4.5 5.3* 4.8   < > 3.9   < > 4.0   CHLORIDE 99 98  --   --   --  95*  --  97*   CO2 38* 37*  --   --   --  36*  --  35*   BUN 43.1* 40.1*  --   --   --  30.9*  --  28.8*   CR 1.13* 1.11*  --   --   --  0.86  --  0.86   * 124* 107 167*  --  128*   < > 116*    < > = values in this interval not displayed.     Liver Function Tests  Recent Labs   Lab 12/29/23  0608 12/28/23  0604 12/27/23  0550 12/26/23  0557 12/25/23  0537 12/24/23  0656 12/23/23  0623   AST  --  20  --   --  19  --  23   ALT  --  12  --   --  14  --  18   ALKPHOS  --  140  --   --  141  --  154*   BILITOTAL  --  0.7  --   --  0.5  --  0.6   ALBUMIN  --  3.1*  --   --  3.2*  --  3.2*   INR 3.38* 3.45* 2.91* 2.63* 2.66*   < > 1.69*    < > = values in this interval not displayed.     Coagulation Profile  Recent Labs    Lab 12/29/23  0608 12/28/23  0604 12/27/23  0550 12/26/23  0557   INR 3.38* 3.45* 2.91* 2.63*       Radiology: Personally reviewed; radiology read below    XR CHEST 12/27/2023  Tracheostomy. Sternotomy and valve replacement. Enteric tube within the stomach. Right upper extremity PICC line tip in the distal SVC. Heart is mildly enlarged, unchanged. Elevation of the right hemidiaphragm, unchanged. Minimal atelectasis at the right lung base. Lungs otherwise appear clear    XR CHEST 12/19/2023  Poststernotomy changes with a prosthetic TVR and tracheostomy tube tube in place. Feeding tube can be seen coursing into the stomach. Right upper extremity PICC line catheter overlies the proximal right atrium. Elevation of the right hemidiaphragm, unchanged with small right effusion and right basilar and likely middle lobe opacities, presumed atelectasis. Left lung is clear. Heart is slightly enlarged. No signs of failure.    Matt Kennedy, TATIANA  Pulmonary Medicine  [facility]  Pager [phone]  Office [phone]

## 2023-12-30 NOTE — PLAN OF CARE
"  Problem: Artificial Airway  Goal: Effective Communication  12/30/2023 1431 by Bear Valverde, RT  Outcome: Progressing  12/30/2023 1430 by Bear Valverde, RT  Outcome: Progressing  Goal: Optimal Device Function  Outcome: Progressing  Goal: Absence of Device-Related Skin or Tissue Injury  Outcome: Progressing     Problem: Mechanical Ventilation Invasive  Goal: Effective Communication  Outcome: Progressing  Goal: Optimal Device Function  Outcome: Progressing  Goal: Mechanical Ventilation Liberation  Outcome: Progressing  Goal: Absence of Device-Related Skin and Tissue Injury  Outcome: Progressing  RT PROGRESS NOTE     DATA:     CURRENT SETTINGS: AC 14,400, +5 -- at night.              TRACH TYPE / SIZE: #6 Bivona (placed 12/22)             MODE:   TM/PMV             FIO2:   35 to 40%/40L     ACTION:             THERAPIES:   Albuterol BID             SUCTION:                           FREQUENCY:   x2                        AMOUNT:   Small                        CONSISTENCY:   Thick                        COLOR:   Pale Yellow             SPONTANEOUS COUGH EFFORT/STRENGTH OF EFFORT (not elicited by suctioning): Strong Spontaneous Cough                           WEANING PHASE:   Phase 2                        WEAN MODE:    35 to 40%/40L TM/PMV                        WEAN TIME:   Started @ 7:14am                        END WEAN REASON:   ongoing. --- will place on AC at NOC.     RESPONSE:             BS:   Coarse/Diminished             VITAL SIGNS:   Blood pressure 134/62, pulse 89, temperature 97.9  F (36.6  C), temperature source Oral, resp. rate 18, height 1.613 m (5' 3.5\"), weight 75.6 kg (166 lb 11.2 oz), SpO2 100%, not currently breastfeeding.              EMOTIONAL NEEDS / CONCERNS:  NA                RISK FOR SELF DECANNULATION:  No     NOTE / PLAN: Patient has been weaning on - 35-40%/40L TM/PMV - since this morning at 0725 AM and is tolerating well. Plan: will place on Vent - AC at night.                   "

## 2023-12-30 NOTE — PLAN OF CARE
Problem: Adult Inpatient Plan of Care  Goal: Optimal Comfort and Wellbeing  Intervention: Provide Person-Centered Care  Recent Flowsheet Documentation  Taken 12/30/2023 0004 by Kashif Kaye, RN  Trust Relationship/Rapport:   care explained   choices provided     Problem: Artificial Airway  Goal: Optimal Device Function  Intervention: Optimize Device Care and Function  Recent Flowsheet Documentation  Taken 12/30/2023 0004 by Kashif Kaye, RN  Airway Safety Measures: all equipment/monitors on and audible  Aspiration Precautions: NPO pending swallow screening/evaluation     Problem: Anxiety Signs/Symptoms  Goal: Improved Mood Symptoms (Anxiety Signs/Symptoms)  Outcome: Progressing  Flowsheets (Taken 12/30/2023 0642)  Individualized Action Step (Improved Mood Symptoms): Concern about clogged NG/NJ tube  Mutually Determined Action Steps (Improved Mood Symptoms): names internal triggers   Goal Outcome Evaluation:       Patient alert and oriented x4. Denied any pain or discomfort. Continued on vent through the shift. Had the NG/NJ clogged about 0200 hours. Received order for Bicarb and lipase to attempt declogging. Attempted declogging through the shift with no effect. BG was monitored and was stable. The Hospitalist updated. Concerned for the clogged tube triggered her anxiety, but was reassured and controlled. Patient updated her daughter and asked to come to the hospital early because of the clogged tube. Otherwisw stable.

## 2023-12-30 NOTE — PROGRESS NOTES
[facility]    Medicine Progress Note - Hospitalist Service    Date of Admission:  12/15/2023    Brief summary:  Priya Funez is a 83 year old female with a PMHx of HFpEF, severe TR, permanent AF s/p AVN ablation with PPM implant 1/11/19 s/p extraction TV (transvenous) PPM and implant leadless PPM 7/31/23, emergent aortic dissection repair 1/4/19, HTN, CKD, NEDA, and obesity who presented to [facility] as an outpatient for elective TVR done on 11/28/2023.  Immediate postoperative course was complicated by acute hypoxic and hypercapnic respiratory failure requiring BiPAP and probable pneumonia and treated with 7 days of IV Zosyn, patient was transferred to surgical telemetry floor on 12/2/2020.  On 12/6/2023 she was transferred back to CVICU after RRT for acute hypoxic respiratory distress and subsequent respiratory arrest with short CPR, re-intubation, and ROSC achieved. She sustained mildly displaced and nondisplaced right 3-5 rib fractures with associated right chest wall hematoma and nondisplaced anterolateral left 4th and 5th rib fractures. She was treated with meropenem 7 day course. She also has hx of recent C diff and multiple loose stools, for which she completed a 7 day po vancomycin course (per antimicrobial stewardship team, more likely to be colonization with recent history and no apparent treatment). Patient was transiently hyperglycemic and treated with insulin infusion then transitioned to sliding scale insulin per protocol. Blood sugars remained stable, has not required insulin coverage.  She remained intubated in the CVICU until undergoing tracheostomy by Dr. Pitts on 12/13/23, doing well with trach dome and PS currently.  She was treated for volume overload with diuretics (Bumex and chlorothiazide) with subsequent hypernatremia and hypotension requiring FWF and pressor support.  Spironolactone, torsemide and metoprolol discontinued and midodrine started with now stable blood pressures. Pre-op weight 70.9 kg,  discharge weight 74.1 kg (163 lbs) on 12/15/2023.  Patient was discharged to LTACH 12/15/2023 for continuing vent weaning, therapies      LTACH course:  12/17: Vitals stable.  Scheduled midodrine discontinued with BP stable in the 140s.  Diuretic therapy with torsemide restarted given peripheral edema and volume status, however at lower dose of 10 mg daily (home dose 40 mg daily) titrate slowly based on BP response.  Resume metoprolol for A-fib rate control as well gradually.  Labs reviewed, stable electrolytes, creatinine 0.82. ProBNP 3710, down from 86147 and 04113.  Hgb 8.0 from 7.6.  INR 0.98 from 1.12.  Continue heparin and Coumadin together for now until INR therapeutic.  Started hydroxyzine for anxiety 12/17.  A.m. labs ordered.       12/18-12/24:  increased torsemide to 20 mg po daily due to peripheral edema on 12/18, having loose stools,  changed tube feeds to higher fiber formula and add imodium daily and prn.  Still with edema,  increase toresemide to 40 mg po daily on 12/19 and increased imodium to bid and prn, changed heparin to lovenox for pt comfort.  She is getting prn hydroxyzine for anxiety which we should try to titrate off as anticoholinergics are not optimal in the geriatric population. 12/22 switched to bumex 1 mg daily and increased to bumex 1 mg BID 12/23.  Encouraged leg elevation above heart. Give hydrochlorothiazide one dose 12/24 - if that doesn't help then would need to try IV diuretics.  INR above 2 for the first time on 12/24 so if above 2 on 12/25 then could stop the lovenox.    12/25 - 12/29.  Had extensive lower extremity edema.  Switched Bumex to IV and then increased it from Bumex 1 mg twice daily to Bumex 1.5 mg twice daily.  Has been diuresing well.  Developed dermatitis on the lower extremities.  Started hydrocortisone cream.  12/27, VBG revealed hypercapnia she was put back on the mechanical ventilator with improvement.  12/29. Continues to diurese well. Plan to monitor kidney  function with creatinine.  She remains on vent weaning phase 2.  Making some progress.    12/30:  lower extremities are still swollen -increased bumex to 2 mg IV bid, more erythema, will monitor - may need to treat for cellulitis.      Assessment & Plan   Acute respiratory failure and arrest on 12/6/23   Acute hypercapnic and hypoxic respiratory failure requiring BiPAP  s/p tracheostomy 12/13/23  s/p Mechanical ventilation  Aspiration pneumonitis vs pneumonia  Possible aspiration event 12/1  Near complete plugging of left lower lobe on CT   Acute mildly displaced and nondisplaced right 3-5 rib fractures with associated right chest wall hematoma and nondisplaced anterolateral left fourth and fifth rib fractures  -Vent weaning per pulmonology  -RT for tracheostomy cares, oxygenation, nebs, pulmonary therapies  - Tracheostomy sutures to be removed (12/18/2023)  - Mucomyst, duoneb w/ RT Q4H   - SLP speaking valve as tolerated      S/p respiratory arrest 12/6  Hx of severe TR s/p TVR  HFpEF  Hx of Afib s/p AVN ablation - PPM, upgraded to leadless 7/2023  Hypertension  Hyperlipidemia  Hx of aortic dissection repair 2019  Respiratory arrest 12/6   - Stable paced rhythm with underlying atrial flutter. On review of cardiology, underlying aflutter/afib has been present since at least June of this year. Device RN adjusted PPM to PTA settings rate 60 at [facility].   - Pre-op 9/8/23 echo: LVEF 55/60%, mildly dilated RV, normal function  - Echo 12/4 with LVEF 60-65%, normal RV function, TV mean gradient 5 mmHg, no TR  - Echo 12/7 with no significant change from previous with the exception of mild reduction of RV. RV free wall dyskinesia.   - ASA 81 mg daily  -was on scheduled midodrine 10 mg Q8H but stopped that 12/16/23 since BP in the 140s systolic and stable  -Continue midodrine 5 mg TID PRN for SBP<100   - Started Warfarin 12/15.    -Discontinued lovenox 12/26/2023. Continue with coumadin INR on goal.Pharmacy consulted for  Coumadin dosing (INR goal 2-3)      Acute on chronic diastolic heart failure with Elevated BNP, improving   - Held PTA spironolactone, torsemide, metoprolol due to hypotension at prior hospital  - Echo 12/4 with LVEF 60-65%, normal RV function, TV mean gradient 5 mmHg, no TR  - Echo 12/7 with no significant change from previous with the exception of mild reduction of RV. RV free wall dyskinesia.   - FLUID STATUS: Pre-op weight 70.9 kg, discharge weight 74.1 kg (163 lbs) on 12/15/2023 and 165 lbs on 12/16  -increased torsemide to 40 mg po daily on 12/19 then switched to bumex 12/22 and increased to bumex 1 mg BID on 12/23- increased to 1.5, now increased bumex to 2 mg IV bid on 12/30  -proBNP trend appears improved from a peak of 10117 to now 3710.  -has a new blister on her right foot likely due to fluid retention      blister on top of Right foot  -  likely due to fluid retention  -blister popped on 12/30  -monitor for cellulitis    Multiple loose stools   Hx of C. Difficile (9/2023)  Severe malnutrition in the context of acute illness  - Nutrition consulted for continuing tube feedings- will change to higher fiber tube feeds - on 12/18  - NJT and enteral feeds at goal (45 mL/hr)   - SLP: NPO  - PPI famotidine  - Bowel regimen discontinued due to continued loose stools  - Finished vancomycin po 7 day course   - Continue Banatrol  - PRN Zofran  4 mg PO or IV Q6H  - PRN Compazine 5 mg IV Q6H   - loperamide 2 mg BID and prn     Hypernatremia resolved  Hypokalemia, resolved   CKD stage 3  Lactic acidosis, resolved  IMAN on CKD, resolved   Edema  Baseline creat ~1.2-1.3   FLUID STATUS: Pre-op weight 70.9 kg, discharge weight 74.1 kg (163 lbs).   - Elevated sodium, Na 151 --> 147 on recheck, likely 2/2 aggressive diureses S/p bumex and chlorotiazide prior to transfer  -On free water flushes 200 mL every 4 hourly on 12/16. Fluid up and normal Na so decreased to 30ml q6h and switched her to bumex 12/22  - I/O has been  inaccurate due to unmeasured stools and voids.  - Replete lytes per protocol  - Avoid/limit nephrotoxins as able  - Strict I/O, daily weights, avoid/limit nephrotoxins     Stress induced hyperglycemia  T2DM  Hgb A1c 5.7%, stable off meds  - PTA Jardiance on hold, resume once medically optimized   - Goal BG <180 for optimal healing     Stress induced leukocytosis  Probable aspiration pneumonitis vs pneumonia, resolved  - 11/30 Zosyn 7-day course for pneumonia (finished)   - 12/6 Given further decompensation, Vanco and meropenem was started on 12/6   - Finished meropenem 7 day course (12/12)   - Finished Vancomycin PO 7 day course (for c-diff) (12/13)  - Blood culture with NGTD   - Urine with Candida and sputum with Yeast, likely both colonizations.   - Urine culture with NGTD     Hx of recent C. Difficile  - C. Difficile toxin PCR positive 12/3 with negative antigen and negative toxin - per antimicrobial stewardship team, more likely to be colonization with recent history and no apparent treatment  - Finished oral vancomycin course on 12/13, still with diarrhea but no abd pain, leucocytosis, or fevers so monitoring for now  - Monitor fever curve, WBC, and inflammatory markers as appropriate  - Enteric precautions per infection prevention protocol      3rd, 4th right rib fractures due to CPR  -Continue mini thoracotomy precautions  -Judicious use of narcotics, de-escalate as able     Acute blood loss anemia  - Hgb has been stable.   -Hgb 7.6 on 12/16 and 8.0 on 12/17/2023  -Iron studies: Serum iron 29, iron binding capacity 216, saturation index 13%.  -Follow CBC with weekly labs.   -Transfuse if Hgb<7    Dermatitis, acute  -Improving  -Erythematous rash on bilateral lower extremities  -Continue hydrocortisone cream 2.5%, apply to affected areas twice daily     Acute postoperative pain  - PRN: Tylenol, oxycodone     Hx of general anxiety disorder  - PTA Lexapro and Remeron  - Scheduled Melatonin HS      Perineal  "irritation    - WOC consulted    Diet: Adult Formula Drip Feeding: Continuous Jevity 1.5; Nasojejunal; Goal Rate: 50; mL/hr; TF to run x22 hours holding 1 hr before/after warfarin  NPO for Medical/Clinical Reasons Except for: Ice Chips, NPO but receiving Tube Feeding    DVT Prophylaxis: Enoxaparin (Lovenox) subcutaneous transitioning to warfarin   Barrientos Catheter: Not present  Lines: PRESENT      PICC 12/07/23 Triple Lumen Right Basilic ok to use PICC-Site Assessment: WDL      Cardiac Monitoring: None  Code Status: Full Code      Clinically Significant Risk Factors              # Hypoalbuminemia: Lowest albumin = 2.7 g/dL at 12/18/2023  5:17 AM, will monitor as appropriate     # Hypertension: Noted on problem list    # Acute heart failure with preserved ejection fraction: heart failure noted on problem list, last echo with EF >50%, and receiving IV diuretics       # Overweight: Estimated body mass index is 29.07 kg/m  as calculated from the following:    Height as of this encounter: 1.613 m (5' 3.5\").    Weight as of this encounter: 75.6 kg (166 lb 11.2 oz).   # Severe Malnutrition: based on nutrition assessment      # Financial/Environmental Concerns: none   # Pacemaker present  # History of CABG: noted on surgical history       Disposition Plan     Expected Discharge Date: 01/03/2024      Destination: home with family;foster/protective services            Clarissa Ndiaye MD  Hospitalist Service  LTACH  Securely message with [employer] (more info)  Text page via [facility] Paging/Directory   ______________________________________________________________________    Interval History   She reports she has intermittent dyspnea, same as yesterday, no chest pain.  She reports blister popped on right foot and only noticed after pointing out that the dressing is now yellow in color, reports erythema of b/l lower extremities is better than yesterday     Physical Exam   Vital Signs: Temp: 97.9  F (36.6  C) Temp src: Oral BP: " 129/60 Pulse: 60   Resp: 18 SpO2: 94 % O2 Device: Trach dome Oxygen Delivery: 40 LPM  Weight: 166 lbs 11.2 oz    GENERAL: mild respiratory distress  HEENT: Head is normocephalic atraumatic eyes pupils appear equal round and reactive to light.  NECK: Supple, Tracheostomy in place  NOSE: NJ tube in place  LUNGS: rhonchi bilaterally, decreased breath sounds at bases   HEART: S1 S2, Rate and rhythm is regular. No murmurs, 2+ BLE edema  ABDOMEN: Soft, nontender, no distension. Bowel sounds are positive. No guarding or rebound.   EXTREMITIES: 2-3+ bilateral lower extremity edema noted   SKIN: large 3-4 cm fluid filled sac on dorsum right foot, +erythema of b/l lower extremities   PSYCHIATRIC: Normal affect and cognition     Medical Decision Making       52 MINUTES SPENT BY ME on the date of service doing chart review, history, exam, documentation & further activities per the note.      Data   Lab Results   Component Value Date    WBC 6.1 12/28/2023    WBC 5.3 06/14/2021     Lab Results   Component Value Date    RBC 2.64 12/28/2023    RBC 4.33 06/14/2021     Lab Results   Component Value Date    HGB 7.8 12/28/2023    HGB 12.8 06/14/2021     Lab Results   Component Value Date    HCT 25.9 12/28/2023    HCT 40.1 06/14/2021     Lab Results   Component Value Date    MCV 98 12/28/2023    MCV 93 06/14/2021     Lab Results   Component Value Date    MCH 29.5 12/28/2023    MCH 29.6 06/14/2021     Lab Results   Component Value Date    MCHC 30.1 12/28/2023    MCHC 31.9 06/14/2021     Lab Results   Component Value Date    RDW 17.9 12/28/2023    RDW 13.5 06/14/2021     Lab Results   Component Value Date     12/28/2023     06/14/2021       Last Comprehensive Metabolic Panel:  Sodium   Date Value Ref Range Status   12/30/2023 145 135 - 145 mmol/L Final     Comment:     Reference intervals for this test were updated on 09/26/2023 to more accurately reflect our healthy population. There may be differences in the flagging of  prior results with similar values performed with this method. Interpretation of those prior results can be made in the context of the updated reference intervals.    06/23/2021 138 133 - 144 mmol/L Final     Potassium   Date Value Ref Range Status   12/30/2023 3.5 3.4 - 5.3 mmol/L Final   12/13/2022 3.8 3.4 - 5.3 mmol/L Final   06/23/2021 4.5 3.4 - 5.3 mmol/L Final     Potassium POCT   Date Value Ref Range Status   12/27/2023 5.3 (H) 3.5 - 5.0 mmol/L Final     Chloride   Date Value Ref Range Status   12/30/2023 99 98 - 107 mmol/L Final   12/13/2022 99 94 - 109 mmol/L Final   06/23/2021 104 94 - 109 mmol/L Final     Carbon Dioxide   Date Value Ref Range Status   06/23/2021 31 20 - 32 mmol/L Final     Carbon Dioxide (CO2)   Date Value Ref Range Status   12/30/2023 39 (H) 22 - 29 mmol/L Final   12/13/2022 33 (H) 20 - 32 mmol/L Final     Anion Gap   Date Value Ref Range Status   12/30/2023 7 7 - 15 mmol/L Final   12/13/2022 7 3 - 14 mmol/L Final   06/23/2021 3 3 - 14 mmol/L Final     Glucose   Date Value Ref Range Status   12/30/2023 98 70 - 99 mg/dL Final   12/13/2022 84 70 - 99 mg/dL Final   06/23/2021 86 70 - 99 mg/dL Final     GLUCOSE BY METER POCT   Date Value Ref Range Status   12/30/2023 101 (H) 70 - 99 mg/dL Final     Urea Nitrogen   Date Value Ref Range Status   12/30/2023 39.7 (H) 8.0 - 23.0 mg/dL Final   12/13/2022 31 (H) 7 - 30 mg/dL Final   06/23/2021 30 7 - 30 mg/dL Final     Creatinine   Date Value Ref Range Status   12/30/2023 1.02 (H) 0.51 - 0.95 mg/dL Final   06/23/2021 1.29 (H) 0.52 - 1.04 mg/dL Final     GFR Estimate   Date Value Ref Range Status   12/30/2023 55 (L) >60 mL/min/1.73m2 Final   06/23/2021 39 (L) >60 mL/min/[1.73_m2] Final     Comment:     Non  GFR Calc  Starting 12/18/2018, serum creatinine based estimated GFR (eGFR) will be   calculated using the Chronic Kidney Disease Epidemiology Collaboration   (CKD-EPI) equation.       Calcium   Date Value Ref Range Status    12/30/2023 8.7 (L) 8.8 - 10.2 mg/dL Final   06/23/2021 9.2 8.5 - 10.1 mg/dL Final     Bilirubin Total   Date Value Ref Range Status   12/28/2023 0.7 <=1.2 mg/dL Final   04/04/2019 1.0 0.2 - 1.3 mg/dL Final     Alkaline Phosphatase   Date Value Ref Range Status   12/28/2023 140 40 - 150 U/L Final     Comment:     Reference intervals for this test were updated on 11/14/2023 to more accurately reflect our healthy population. There may be differences in the flagging of prior results with similar values performed with this method. Interpretation of those prior results can be made in the context of the updated reference intervals.   04/04/2019 104 40 - 150 U/L Final     ALT   Date Value Ref Range Status   12/28/2023 12 0 - 50 U/L Final     Comment:     Reference intervals for this test were updated on 6/12/2023 to more accurately reflect our healthy population. There may be differences in the flagging of prior results with similar values performed with this method. Interpretation of those prior results can be made in the context of the updated reference intervals.     06/14/2021 22 0 - 50 U/L Final     AST   Date Value Ref Range Status   12/28/2023 20 0 - 45 U/L Final     Comment:     Reference intervals for this test were updated on 6/12/2023 to more accurately reflect our healthy population. There may be differences in the flagging of prior results with similar values performed with this method. Interpretation of those prior results can be made in the context of the updated reference intervals.   04/04/2019 27 0 - 45 U/L Final

## 2023-12-30 NOTE — PLAN OF CARE
Problem: Artificial Airway  Goal: Effective Communication  Outcome: Progressing  Goal: Optimal Device Function  Outcome: Progressing  Intervention: Optimize Device Care and Function  Recent Flowsheet Documentation  Taken 12/29/2023 2256 by Haim Mix RT  Airway Safety Measures: all equipment/monitors on and audible  Goal: Absence of Device-Related Skin or Tissue Injury  Outcome: Progressing     Problem: Mechanical Ventilation Invasive  Goal: Optimal Device Function  Intervention: Optimize Device Care and Function  Recent Flowsheet Documentation  Taken 12/29/2023 2256 by Haim Mix RT  Airway Safety Measures: all equipment/monitors on and audible     Problem: Mechanical Ventilation Invasive  Goal: Optimal Device Function  Intervention: Optimize Device Care and Function  Recent Flowsheet Documentation  Taken 12/29/2023 2256 by Haim Mix RT  Airway Safety Measures: all equipment/monitors on and audible  Problem: Artificial Airway  Goal: Effective Communication  Outcome: Progressing  Goal: Optimal Device Function  Outcome: Progressing  Intervention: Optimize Device Care and Function  Recent Flowsheet Documentation  Taken 12/20/2023 0033 by Haim Mix RT  Airway Safety Measures: all equipment/monitors on and audible  Taken 12/19/2023 1950 by Haim Mix RT  Airway Safety Measures: all equipment/monitors on and audible  Goal: Absence of Device-Related Skin or Tissue Injury  Outcome: Progressing     Problem: Mechanical Ventilation Invasive  Goal: Effective Communication  Outcome: Progressing  Goal: Optimal Device Function  Outcome: Progressing  Intervention: Optimize Device Care and Function  Recent Flowsheet Documentation  Taken 12/20/2023 0033 by Haim Mix RT  Airway Safety Measures: all equipment/monitors on and audible  Taken 12/19/2023 1950 by Haim Mix RT  Airway Safety Measures: all equipment/monitors on and audible  Goal: Mechanical Ventilation Liberation  Outcome:  "Progressing    RT PROGRESS NOTE     DATA:     CURRENT SETTINGS: 14,400, +5             TRACH TYPE / SIZE: #6 Bivona, placed on 12/22             MODE: AC             FIO2: 30%     ACTION:             THERAPIES: Albuterol BID             SUCTION:                           FREQUENCY: X2                        AMOUNT: small to moderate                        CONSISTENCY: thick                        COLOR: pale yellow             SPONTANEOUS COUGH EFFORT/STRENGTH OF EFFORT (not elicited by suctioning): good productive              WEANING PHASE: 2                        WEAN MODE: 35%/40L TM/PMV                        WEAN TIME: 15:45                        END WEAN REASON: AC for night      RESPONSE:             BS: Clear             VITAL SIGNS: BP (!) 147/64 (BP Location: Left arm)   Pulse 62   Temp 98.1  F (36.7  C) (Axillary)   Resp 22   Ht 1.613 m (5' 3.5\")   Wt 77.1 kg (170 lb)   SpO2 97%   BMI 29.64 kg/m                  EMOTIONAL NEEDS / CONCERNS:                  RISK FOR SELF DECANNULATION:                          RISK DUE TO:                          INTERVENTION/S IN PLACE IS/ARE:         NOTE / PLAN:  TM/PMV days and AC at night.                                                                     "

## 2023-12-30 NOTE — PROGRESS NOTES
Bicarb+lipase ordered for clogged NG tube.  Jose Rodriguez MD .......... December 30, 2023, 2:34 AM

## 2023-12-30 NOTE — PLAN OF CARE
Problem: Adult Inpatient Plan of Care  Goal: Absence of Hospital-Acquired Illness or Injury  Intervention: Prevent Skin Injury  Recent Flowsheet Documentation  Taken 12/30/2023 1700 by Alayna Caldwell RN  Body Position: position changed independently  Taken 12/30/2023 1200 by Alayna Caldwell, RN  Body Position: position changed independently     Problem: Skin Injury Risk Increased  Goal: Skin Health and Integrity  Intervention: Optimize Skin Protection  Recent Flowsheet Documentation  Taken 12/30/2023 1700 by Alayna Caldwell, RN  Activity Management: up in chair  Taken 12/30/2023 1200 by Alayna Caldwell, RN  Activity Management: up in chair  Patient right leg wound dressing was change this shift.

## 2023-12-30 NOTE — PLAN OF CARE
Problem: Adult Inpatient Plan of Care  Goal: Plan of Care Review  Description: The Plan of Care Review/Shift note should be completed every shift.  The Outcome Evaluation is a brief statement about your assessment that the patient is improving, declining, or no change.  This information will be displayed automatically on your shift  note.  Outcome: Progressing  Flowsheets (Taken 12/29/2023 2341)  Plan of Care Reviewed With: patient  Overall Patient Progress: improving     Problem: Artificial Airway  Goal: Effective Communication  Outcome: Progressing  Intervention: Ensure Effective Communication  Recent Flowsheet Documentation  Taken 12/29/2023 1700 by Sandy Sauer RN  Communication Enhancement Strategies: call light answered in person  Family/Support System Care:   involvement promoted   support provided  Trust Relationship/Rapport:   care explained   choices provided  Goal: Optimal Device Function  Outcome: Progressing  Intervention: Optimize Device Care and Function  Recent Flowsheet Documentation  Taken 12/29/2023 1700 by Sandy Sauer RN  Airway Safety Measures: all equipment/monitors on and audible  Aspiration Precautions: NPO pending swallow screening/evaluation  Goal: Absence of Device-Related Skin or Tissue Injury  Outcome: Progressing  Intervention: Maintain Skin and Tissue Health  Recent Flowsheet Documentation  Taken 12/29/2023 1700 by Sandy Sauer RN  Device Skin Pressure Protection: absorbent pad utilized/changed     Problem: Enteral Nutrition  Goal: Absence of Aspiration Signs and Symptoms  Outcome: Progressing  Intervention: Minimize Aspiration Risk  Recent Flowsheet Documentation  Taken 12/29/2023 1700 by Sandy Sauer RN  Head of Bed (HOB) Positioning: HOB at 60 degrees  Goal: Safe, Effective Therapy Delivery  Outcome: Progressing  Goal: Feeding Tolerance  Outcome: Progressing     Problem: Anxiety Signs/Symptoms  Goal: Improved Mood Symptoms (Anxiety  Ok to enter order for pregnancy blood test?   Signs/Symptoms)  Outcome: Progressing  Intervention: Optimize Emotion and Mood  Recent Flowsheet Documentation  Taken 12/29/2023 1700 by Sandy Sauer RN  Supportive Measures: active listening utilized     Problem: Mechanical Ventilation Invasive  Goal: Effective Communication  Outcome: Progressing  Intervention: Ensure Effective Communication  Recent Flowsheet Documentation  Taken 12/29/2023 1700 by Sandy Sauer RN  Communication Enhancement Strategies: call light answered in person  Family/Support System Care:   involvement promoted   support provided  Trust Relationship/Rapport:   care explained   choices provided  Goal: Optimal Device Function  Outcome: Progressing  Intervention: Optimize Device Care and Function  Recent Flowsheet Documentation  Taken 12/29/2023 1700 by Sandy Sauer RN  Airway Safety Measures: all equipment/monitors on and audible     Goal Outcome Evaluation:      Plan of Care Reviewed With: patient    Overall Patient Progress: improvingOverall Patient Progress: improving    Patient is alert and oriented X 4. Vital signs stable  on Trache dome at 35% FiO2.  RT  and NP Pulmonologist following  patient.  She was up in  her reclining  chair till bedtime.  She  was  stand by assist of 1  to and from bedside  commode for  her  toileting needs. She had   3  loose BM's , 1 medium and 2 small in amounts.  She is currently on  Saccharomyces boulardi 250 mg capsule dosing  2 X a day. Her tube feeding regimen   goes  on continuous X 22 hours per day and scheduled water flushes given per Provider's orders. Her voids were mostly mixed with  stools. She requested  for PRN doses of Tylenol  for trache site discomfort rated 5/10 and  Atarax for anxiety at  2035 with good effect. She was switched to full mechanical ventilation at around  2256 by RT. Will keep  monitoring patient's progress and safety.

## 2023-12-31 NOTE — PLAN OF CARE
Problem: Artificial Airway  Goal: Effective Communication  Outcome: Progressing  Goal: Optimal Device Function  Outcome: Progressing  Intervention: Optimize Device Care and Function  Recent Flowsheet Documentation  Taken 12/31/2023 1515 by Elias Donald, RT  Airway Safety Measures: all equipment/monitors on and audible  Taken 12/31/2023 1100 by Elias Donald, RT  Airway Safety Measures: all equipment/monitors on and audible  Taken 12/31/2023 0727 by Elias Donald, RT  Airway Safety Measures: all equipment/monitors on and audible  Goal: Absence of Device-Related Skin or Tissue Injury  Outcome: Progressing   RT PROGRESS NOTE     DATA:     CURRENT SETTINGS:ac/14/400/+5             TRACH TYPE / SIZE:  #6 bivona 12/22             MODE:   ac mode at night, tm/pmv days             FIO2:   30%     ACTION:             THERAPIES:   alb/bid             SUCTION:                           FREQUENCY:   x2                        AMOUNT:   small/mod                        CONSISTENCY:   thick                        COLOR:   pale/yellow             SPONTANEOUS COUGH EFFORT/STRENGTH OF EFFORT (not elicited by suctioning):                               WEANING PHASE:   2                        WEAN MODE:    30%/40L TM/PMV                        WEAN TIME:   since 0727                        END WEAN REASON:   ongoing     RESPONSE:             BS:   cra             VITAL SIGNS:   SPO2 94-97%. RR 18-22             EMOTIONAL NEEDS / CONCERNS:                  RISK FOR SELF DECANNULATION:                          RISK DUE TO:                          INTERVENTION/S IN PLACE IS/ARE:         NOTE / PLAN:   Goal Outcome Evaluation:  Placed patient on 30%/40L/tm/pmv , tolerating well, suctioned x2 for small/mod/thick pale/yellow,   Pt to go full vent support at night per order

## 2023-12-31 NOTE — PROGRESS NOTES
[facility]    Medicine Progress Note - Hospitalist Service    Date of Admission:  12/15/2023    Brief summary:  Priya Funez is a 83 year old female with a PMHx of HFpEF, severe TR, permanent AF s/p AVN ablation with PPM implant 1/11/19 s/p extraction TV (transvenous) PPM and implant leadless PPM 7/31/23, emergent aortic dissection repair 1/4/19, HTN, CKD, NEDA, and obesity who presented to [facility] as an outpatient for elective TVR done on 11/28/2023.  Immediate postoperative course was complicated by acute hypoxic and hypercapnic respiratory failure requiring BiPAP and probable pneumonia and treated with 7 days of IV Zosyn, patient was transferred to surgical telemetry floor on 12/2/2020.  On 12/6/2023 she was transferred back to CVICU after RRT for acute hypoxic respiratory distress and subsequent respiratory arrest with short CPR, re-intubation, and ROSC achieved. She sustained mildly displaced and nondisplaced right 3-5 rib fractures with associated right chest wall hematoma and nondisplaced anterolateral left 4th and 5th rib fractures. She was treated with meropenem 7 day course. She also has hx of recent C diff and multiple loose stools, for which she completed a 7 day po vancomycin course (per antimicrobial stewardship team, more likely to be colonization with recent history and no apparent treatment). Patient was transiently hyperglycemic and treated with insulin infusion then transitioned to sliding scale insulin per protocol. Blood sugars remained stable, has not required insulin coverage.  She remained intubated in the CVICU until undergoing tracheostomy by Dr. Pitts on 12/13/23, doing well with trach dome and PS currently.  She was treated for volume overload with diuretics (Bumex and chlorothiazide) with subsequent hypernatremia and hypotension requiring FWF and pressor support.  Spironolactone, torsemide and metoprolol discontinued and midodrine started with now stable blood pressures. Pre-op weight 70.9 kg,  discharge weight 74.1 kg (163 lbs) on 12/15/2023.  Patient was discharged to LTACH 12/15/2023 for continuing vent weaning, therapies      LTACH course:  12/17: Vitals stable.  Scheduled midodrine discontinued with BP stable in the 140s.  Diuretic therapy with torsemide restarted given peripheral edema and volume status, however at lower dose of 10 mg daily (home dose 40 mg daily) titrate slowly based on BP response.  Resume metoprolol for A-fib rate control as well gradually.  Labs reviewed, stable electrolytes, creatinine 0.82. ProBNP 3710, down from 99714 and 26809.  Hgb 8.0 from 7.6.  INR 0.98 from 1.12.  Continue heparin and Coumadin together for now until INR therapeutic.  Started hydroxyzine for anxiety 12/17.  A.m. labs ordered.       12/18-12/24:  increased torsemide to 20 mg po daily due to peripheral edema on 12/18, having loose stools,  changed tube feeds to higher fiber formula and add imodium daily and prn.  Still with edema,  increase toresemide to 40 mg po daily on 12/19 and increased imodium to bid and prn, changed heparin to lovenox for pt comfort.  She is getting prn hydroxyzine for anxiety which we should try to titrate off as anticoholinergics are not optimal in the geriatric population. 12/22 switched to bumex 1 mg daily and increased to bumex 1 mg BID 12/23.  Encouraged leg elevation above heart. Give hydrochlorothiazide one dose 12/24 - if that doesn't help then would need to try IV diuretics.  INR above 2 for the first time on 12/24 so if above 2 on 12/25 then could stop the lovenox.    12/25 - 12/29.  Had extensive lower extremity edema.  Switched Bumex to IV and then increased it from Bumex 1 mg twice daily to Bumex 1.5 mg twice daily.  Has been diuresing well.  Developed dermatitis on the lower extremities.  Started hydrocortisone cream.  12/27, VBG revealed hypercapnia she was put back on the mechanical ventilator with improvement.  12/29. Continues to diurese well. Plan to monitor kidney  function with creatinine.  She remains on vent weaning phase 2.  Making some progress.    12/30:  lower extremities are still swollen -increased bumex to 2 mg IV bid, more erythema, will monitor - may need to treat for cellulitis.    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  Increased urinary output, decreased swelling of LE.      Assessment & Plan   Acute respiratory failure and arrest on 12/6/23   Acute hypercapnic and hypoxic respiratory failure requiring BiPAP  s/p tracheostomy 12/13/23  s/p Mechanical ventilation  Aspiration pneumonitis vs pneumonia  Possible aspiration event 12/1  Near complete plugging of left lower lobe on CT   Acute mildly displaced and nondisplaced right 3-5 rib fractures with associated right chest wall hematoma and nondisplaced anterolateral left fourth and fifth rib fractures  - weaning per pulmonology  -RT for tracheostomy cares, oxygenation, nebs, pulmonary therapies  - Tracheostomy sutures removed (12/18/2023)  - Mucomyst, duoneb w/ RT   - SLP speaking valve as tolerated      S/p respiratory arrest 12/6  Hx of severe TR s/p TVR  HFpEF  Hx of Afib s/p AVN ablation - PPM, upgraded to leadless 7/2023  Hypertension  Hyperlipidemia  Hx of aortic dissection repair 2019  Respiratory arrest 12/6   - Stable paced rhythm with underlying atrial flutter. On review of cardiology, underlying aflutter/afib has been present since at least June of this year. Device RN adjusted PPM to PTA settings rate 60 at [facility].   - Pre-op 9/8/23 echo: LVEF 55/60%, mildly dilated RV, normal function  - Echo 12/4 with LVEF 60-65%, normal RV function, TV mean gradient 5 mmHg, no TR  - Echo 12/7 with no significant change from previous with the exception of mild reduction of RV. RV free wall dyskinesia.   - ASA 81 mg daily  -was on scheduled midodrine 10 mg Q8H but stopped that 12/16/23 since BP in the 140s systolic and  stable  -Continue midodrine 5 mg TID PRN for SBP<100   - Started Warfarin 12/15.    -Discontinued lovenox 12/26/2023. Continue with coumadin INR on goal.Pharmacy consulted for Coumadin dosing (INR goal 2-3)      Acute on chronic diastolic heart failure with Elevated BNP, improving   - Held PTA spironolactone, torsemide, metoprolol due to hypotension at prior hospital  - Echo 12/4 with LVEF 60-65%, normal RV function, TV mean gradient 5 mmHg, no TR  - Echo 12/7 with no significant change from previous with the exception of mild reduction of RV. RV free wall dyskinesia.   - FLUID STATUS: Pre-op weight 70.9 kg, discharge weight 74.1 kg (163 lbs) on 12/15/2023 and 165 lbs on 12/16  -increased torsemide to 40 mg po daily on 12/19 then switched to bumex 12/22 and increased to bumex 1 mg BID on 12/23- increased to 1.5, now increased bumex to 2 mg IV bid on 12/30  -proBNP trend appears improved from a [place] of 55045 to now 3710.  -has a new blister on her right foot likely due to fluid retention      blister on top of Right foot  -  likely due to fluid retention  -blister popped on 12/30  -monitor for cellulitis    Multiple loose stools   Hx of C. Difficile (9/2023)  Severe malnutrition in the context of acute illness  - Nutrition consulted for continuing tube feedings- will change to higher fiber tube feeds - on 12/18  - NJT and enteral feeds at goal (45 mL/hr)   - SLP: NPO  - PPI famotidine  - Bowel regimen discontinued due to continued loose stools  - Finished vancomycin po 7 day course   - Continue Banatrol  - PRN Zofran  4 mg PO or IV Q6H  - PRN Compazine 5 mg IV Q6H   - loperamide 2 mg BID and prn     Hypernatremia   Hypokalemia  CKD stage 3  Lactic acidosis, resolved  IMAN on CKD, resolved   Edema  Baseline creat ~1.2-1.3   FLUID STATUS: Pre-op weight 70.9 kg, discharge weight 74.1 kg (163 lbs).   - Elevated sodium, Na 151 --> 147 on recheck, likely 2/2 aggressive diureses S/p bumex and chlorotiazide prior to  transfer  -On free water flushes 30ml q4h  - I/O has been inaccurate due to unmeasured stools and voids.  - Replete lytes per protocol  - Avoid/limit nephrotoxins as able  - Strict I/O, daily weights, avoid/limit nephrotoxins     Stress induced hyperglycemia  T2DM  Hgb A1c 5.7%, stable off meds  - PTA Jardiance on hold, resume once medically optimized   - Goal BG <180 for optimal healing     Stress induced leukocytosis  Probable aspiration pneumonitis vs pneumonia, resolved  - 11/30 Zosyn 7-day course for pneumonia (finished)   - 12/6 Given further decompensation, Vanco and meropenem was started on 12/6   - Finished meropenem 7 day course (12/12)   - Finished Vancomycin PO 7 day course (for c-diff) (12/13)  - Blood culture with NGTD   - Urine with Candida and sputum with Yeast, likely both colonizations.   - Urine culture with NGTD     Hx of recent C. Difficile  - C. Difficile toxin PCR positive 12/3 with negative antigen and negative toxin - per antimicrobial stewardship team, more likely to be colonization with recent history and no apparent treatment  - Finished oral vancomycin course on 12/13, still with diarrhea but no abd pain, leucocytosis, or fevers so monitoring for now  - Monitor fever curve, WBC, and inflammatory markers as appropriate  - Enteric precautions per infection prevention protocol      3rd, 4th right rib fractures due to CPR  -Continue mini thoracotomy precautions  -Judicious use of narcotics, de-escalate as able     Acute blood loss anemia  - Hgb has been stable.   -Hgb 7.6 on 12/16 and 8.0 on 12/17/2023  -Iron studies: Serum iron 29, iron binding capacity 216, saturation index 13%.  -Follow CBC with weekly labs.   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   -f/up outpt     Diet: Adult Formula Drip Feeding: Continuous Jevity 1.5; Nasojejunal; Goal Rate: 50; mL/hr; TF to run x22 hours holding 1 hr before/after warfarin  NPO for Medical/Clinical Reasons Except for: Ice Chips, NPO but receiving Tube Feeding    DVT Prophylaxis: Enoxaparin (Lovenox) subcutaneous transitioning to warfarin   Barrientos Catheter: Not present  Lines: PRESENT      PICC 12/07/23 Triple Lumen Right Basilic ok to use PICC-Site Assessment: WDL      Cardiac Monitoring: None  Code Status: Full Code      Clinically Significant Risk Factors        # Hypokalemia: Lowest K = 3.2 mmol/L in last 2 days, will replace as needed       # Hypoalbuminemia: Lowest albumin = 2.7 g/dL at 12/18/2023  5:17 AM, will monitor as appropriate     # Hypertension: Noted on problem list    # Acute heart failure with preserved ejection fraction: heart failure noted on problem list, last echo with EF >50%, and receiving IV diuretics       # Overweight: Estimated body mass index is 28.89 kg/m  as calculated from the following:    Height as of this encounter: 1.613 m (5' 3.5\").    Weight as of this encounter: 75.2 kg (165 lb 11.2 oz).   # Severe Malnutrition: based on nutrition assessment      # Financial/Environmental Concerns: none   # Pacemaker present  # History of CABG: noted on surgical history       Disposition Plan     Expected Discharge Date: 01/03/2024      Destination: home with family;foster/protective services            Clarissa Ndiaye MD  Hospitalist Service  LTACH  Securely message with [employer] (more info)  Text page via [facility] Paging/Directory   ______________________________________________________________________    Interval History   Reports that her breathing is better, her LE edema has improved since yesterday,, reports yellow discharge from vagina.      Physical Exam   Vital Signs: Temp: 97.9  F " (36.6  C) Temp src: Oral BP: 134/61 Pulse: 64   Resp: 20 SpO2: 97 % O2 Device: Trach dome Oxygen Delivery: 40 LPM  Weight: 165 lbs 11.2 oz    GENERAL: mild respiratory distress  HEENT: Head is normocephalic atraumatic eyes pupils appear equal round and reactive to light.  NECK: Supple, Tracheostomy in place  NOSE: NJ tube in place  LUNGS: rhonchi bilaterally, decreased breath sounds at bases   HEART: S1 S2, Rate and rhythm is regular. No murmurs, 2+ BLE edema  ABDOMEN: Soft, nontender, no distension. Bowel sounds are positive. No guarding or rebound.  :  stool coming out of vagina    EXTREMITIES: 2-3+ bilateral lower extremity edema noted   SKIN: popped blister R foot, +erythema of b/l lower extremities   PSYCHIATRIC: Normal affect and cognition     Medical Decision Making       55 MINUTES SPENT BY ME on the date of service doing chart review, history, exam, documentation & further activities per the note.      Data   Lab Results   Component Value Date    WBC 6.1 12/28/2023    WBC 5.3 06/14/2021     Lab Results   Component Value Date    RBC 2.64 12/28/2023    RBC 4.33 06/14/2021     Lab Results   Component Value Date    HGB 7.8 12/28/2023    HGB 12.8 06/14/2021     Lab Results   Component Value Date    HCT 25.9 12/28/2023    HCT 40.1 06/14/2021     Lab Results   Component Value Date    MCV 98 12/28/2023    MCV 93 06/14/2021     Lab Results   Component Value Date    MCH 29.5 12/28/2023    MCH 29.6 06/14/2021     Lab Results   Component Value Date    MCHC 30.1 12/28/2023    MCHC 31.9 06/14/2021     Lab Results   Component Value Date    RDW 17.9 12/28/2023    RDW 13.5 06/14/2021     Lab Results   Component Value Date     12/28/2023     06/14/2021       Last Comprehensive Metabolic Panel:  Sodium   Date Value Ref Range Status   12/31/2023 145 135 - 145 mmol/L Final     Comment:     Reference intervals for this test were updated on 09/26/2023 to more accurately reflect our healthy population. There may be  differences in the flagging of prior results with similar values performed with this method. Interpretation of those prior results can be made in the context of the updated reference intervals.    06/23/2021 138 133 - 144 mmol/L Final     Potassium   Date Value Ref Range Status   12/31/2023 3.2 (L) 3.4 - 5.3 mmol/L Final   12/13/2022 3.8 3.4 - 5.3 mmol/L Final   06/23/2021 4.5 3.4 - 5.3 mmol/L Final     Potassium POCT   Date Value Ref Range Status   12/27/2023 5.3 (H) 3.5 - 5.0 mmol/L Final     Chloride   Date Value Ref Range Status   12/31/2023 100 98 - 107 mmol/L Final   12/13/2022 99 94 - 109 mmol/L Final   06/23/2021 104 94 - 109 mmol/L Final     Carbon Dioxide   Date Value Ref Range Status   06/23/2021 31 20 - 32 mmol/L Final     Carbon Dioxide (CO2)   Date Value Ref Range Status   12/31/2023 38 (H) 22 - 29 mmol/L Final   12/13/2022 33 (H) 20 - 32 mmol/L Final     Anion Gap   Date Value Ref Range Status   12/31/2023 7 7 - 15 mmol/L Final   12/13/2022 7 3 - 14 mmol/L Final   06/23/2021 3 3 - 14 mmol/L Final     Glucose   Date Value Ref Range Status   12/31/2023 135 (H) 70 - 99 mg/dL Final   12/13/2022 84 70 - 99 mg/dL Final   06/23/2021 86 70 - 99 mg/dL Final     GLUCOSE BY METER POCT   Date Value Ref Range Status   12/30/2023 101 (H) 70 - 99 mg/dL Final     Urea Nitrogen   Date Value Ref Range Status   12/31/2023 37.2 (H) 8.0 - 23.0 mg/dL Final   12/13/2022 31 (H) 7 - 30 mg/dL Final   06/23/2021 30 7 - 30 mg/dL Final     Creatinine   Date Value Ref Range Status   12/31/2023 0.96 (H) 0.51 - 0.95 mg/dL Final   06/23/2021 1.29 (H) 0.52 - 1.04 mg/dL Final     GFR Estimate   Date Value Ref Range Status   12/31/2023 59 (L) >60 mL/min/1.73m2 Final   06/23/2021 39 (L) >60 mL/min/[1.73_m2] Final     Comment:     Non  GFR Calc  Starting 12/18/2018, serum creatinine based estimated GFR (eGFR) will be   calculated using the Chronic Kidney Disease Epidemiology Collaboration   (CKD-EPI) equation.        Calcium   Date Value Ref Range Status   12/31/2023 8.7 (L) 8.8 - 10.2 mg/dL Final   06/23/2021 9.2 8.5 - 10.1 mg/dL Final     Bilirubin Total   Date Value Ref Range Status   12/31/2023 0.6 <=1.2 mg/dL Final   04/04/2019 1.0 0.2 - 1.3 mg/dL Final     Alkaline Phosphatase   Date Value Ref Range Status   12/31/2023 118 40 - 150 U/L Final     Comment:     Reference intervals for this test were updated on 11/14/2023 to more accurately reflect our healthy population. There may be differences in the flagging of prior results with similar values performed with this method. Interpretation of those prior results can be made in the context of the updated reference intervals.   04/04/2019 104 40 - 150 U/L Final     ALT   Date Value Ref Range Status   12/31/2023 9 0 - 50 U/L Final     Comment:     Reference intervals for this test were updated on 6/12/2023 to more accurately reflect our healthy population. There may be differences in the flagging of prior results with similar values performed with this method. Interpretation of those prior results can be made in the context of the updated reference intervals.     06/14/2021 22 0 - 50 U/L Final     AST   Date Value Ref Range Status   12/31/2023 20 0 - 45 U/L Final     Comment:     Reference intervals for this test were updated on 6/12/2023 to more accurately reflect our healthy population. There may be differences in the flagging of prior results with similar values performed with this method. Interpretation of those prior results can be made in the context of the updated reference intervals.   04/04/2019 27 0 - 45 U/L Final

## 2023-12-31 NOTE — PLAN OF CARE
Problem: Adult Inpatient Plan of Care  Goal: Absence of Hospital-Acquired Illness or Injury  Intervention: Identify and Manage Fall Risk  Recent Flowsheet Documentation  Taken 12/31/2023 1100 by Alayna Caldwell RN  Safety Promotion/Fall Prevention: activity supervised  Intervention: Prevent Skin Injury  Recent Flowsheet Documentation  Taken 12/31/2023 1100 by Alayna Caldwell, RN  Body Position: position changed independently     Problem: Skin Injury Risk Increased  Goal: Skin Health and Integrity  Intervention: Optimize Skin Protection  Recent Flowsheet Documentation  Taken 12/31/2023 1100 by Alayna Caldwell, RN  Activity Management: up in chair

## 2023-12-31 NOTE — PLAN OF CARE
Problem: Mechanical Ventilation Invasive  Goal: Effective Communication  Outcome: Progressing  Goal: Optimal Device Function  Outcome: Progressing  Intervention: Optimize Device Care and Function  Recent Flowsheet Documentation  Taken 12/30/2023 2143 by Rosita Sifuentes RT  Airway Safety Measures:   all equipment/monitors on and audible   manual resuscitator/mask/valve at bedside   oxygen flowmeter   suction at bedside   suction regulator   suction equipment  Taken 12/30/2023 1935 by Rosita Sifuentes RT  Airway Safety Measures:   all equipment/monitors on and audible   manual resuscitator/mask/valve at bedside   oxygen flowmeter   suction at bedside   suction regulator   suction equipment  Goal: Mechanical Ventilation Liberation  Outcome: Progressing  Goal: Absence of Device-Related Skin and Tissue Injury  Outcome: Progressing  Goal: Absence of Ventilator-Induced Lung Injury  Outcome: Progressing     RT PROGRESS NOTE     DATA:     CURRENT SETTINGS:  14/400/+5             TRACH TYPE / SIZE:  #6 Bivona (Placed 12/22)             MODE:  AC              FIO2:   30%     ACTION:             THERAPIES: Alb BID             SUCTION: Yes                         FREQUENCY: 3x                        AMOUNT:   Small                        CONSISTENCY: Thick                        COLOR:  Pale yellow             SPONTANEOUS COUGH EFFORT/STRENGTH OF EFFORT (not elicited by suctioning): good productive cough.                              WEANING PHASE:  2                        WEAN MODE: 30% 40 L TM with PMV                        WEAN TIME:  14 hours and 15 minutes                        END WEAN REASON: Vent at noc     RESPONSE:             BS:  Coarse             VITAL SIGNS: Sat: % HR: 61-73 RR: 16-24             EMOTIONAL NEEDS / CONCERNS:  None                 RISK FOR SELF DECANNULATION:  None                        RISK DUE TO:                          INTERVENTION/S IN PLACE IS/ARE: N/A       NOTE / PLAN:        Pt  currently remains on full vent and tolerating well with no distress.  Resume TM with PMV weaning trials this morning.

## 2023-12-31 NOTE — PLAN OF CARE
"  Problem: Enteral Nutrition  Goal: Absence of Aspiration Signs and Symptoms  Outcome: Progressing  Intervention: Minimize Aspiration Risk  Recent Flowsheet Documentation  Taken 12/31/2023 0256 by Nery Rodriguez RN  Enteral Feeding Safety: tubing labeled as enteral feeding  Oral Care: teeth brushed  Head of Bed (HOB) Positioning: HOB at 60 degrees  AOX4, able to made needs known. AX1 with walker. TF and ice chips tolerated well.No complaints of pain. Loose BMX2. 0100 NGT clogged. Notified MD, new orders received and given. NGT unclogged. Foot dressing changed. No drainage noted. Will continue to monitor.  /62 (BP Location: Left arm)   Pulse 73   Temp 98.8  F (37.1  C) (Oral)   Resp 24   Ht 1.613 m (5' 3.5\")   Wt 75.6 kg (166 lb 11.2 oz)   SpO2 100%   BMI 29.07 kg/m                            "

## 2024-01-01 ENCOUNTER — APPOINTMENT (OUTPATIENT)
Dept: PHYSICAL THERAPY | Facility: CLINIC | Age: 84
DRG: 207 | End: 2024-01-01
Attending: INTERNAL MEDICINE
Payer: COMMERCIAL

## 2024-01-01 ENCOUNTER — ANCILLARY PROCEDURE (OUTPATIENT)
Dept: GENERAL RADIOLOGY | Facility: CLINIC | Age: 84
DRG: 207 | End: 2024-01-01
Attending: STUDENT IN AN ORGANIZED HEALTH CARE EDUCATION/TRAINING PROGRAM
Payer: COMMERCIAL

## 2024-01-01 ENCOUNTER — APPOINTMENT (OUTPATIENT)
Dept: SPEECH THERAPY | Facility: CLINIC | Age: 84
DRG: 207 | End: 2024-01-01
Attending: INTERNAL MEDICINE
Payer: COMMERCIAL

## 2024-01-01 ENCOUNTER — APPOINTMENT (OUTPATIENT)
Dept: OCCUPATIONAL THERAPY | Facility: CLINIC | Age: 84
DRG: 207 | End: 2024-01-01
Attending: INTERNAL MEDICINE
Payer: COMMERCIAL

## 2024-01-01 ENCOUNTER — ANCILLARY PROCEDURE (OUTPATIENT)
Dept: GENERAL RADIOLOGY | Facility: CLINIC | Age: 84
DRG: 207 | End: 2024-01-01
Attending: NURSE PRACTITIONER
Payer: COMMERCIAL

## 2024-01-01 ENCOUNTER — ANCILLARY PROCEDURE (OUTPATIENT)
Dept: GENERAL RADIOLOGY | Facility: CLINIC | Age: 84
DRG: 207 | End: 2024-01-01
Attending: HOSPITALIST
Payer: COMMERCIAL

## 2024-01-01 ENCOUNTER — APPOINTMENT (OUTPATIENT)
Dept: PHYSICAL THERAPY | Facility: CLINIC | Age: 84
DRG: 207 | End: 2024-01-01
Attending: NURSE PRACTITIONER
Payer: COMMERCIAL

## 2024-01-01 VITALS
SYSTOLIC BLOOD PRESSURE: 144 MMHG | RESPIRATION RATE: 28 BRPM | BODY MASS INDEX: 25.29 KG/M2 | OXYGEN SATURATION: 94 % | WEIGHT: 148.15 LBS | TEMPERATURE: 97.8 F | DIASTOLIC BLOOD PRESSURE: 63 MMHG | HEART RATE: 60 BPM | HEIGHT: 64 IN

## 2024-01-01 DIAGNOSIS — Z92.89 HISTORY OF ADMISSION TO INTENSIVE CARE UNIT: Primary | ICD-10-CM

## 2024-01-01 DIAGNOSIS — Z95.2 S/P TAVR (TRANSCATHETER AORTIC VALVE REPLACEMENT): ICD-10-CM

## 2024-01-01 DIAGNOSIS — Z98.890 HISTORY OF TRACHEOSTOMY: ICD-10-CM

## 2024-01-01 LAB
ALBUMIN SERPL BCG-MCNC: 2.7 G/DL (ref 3.5–5.2)
ALBUMIN SERPL BCG-MCNC: 3.1 G/DL (ref 3.5–5.2)
ALBUMIN SERPL BCG-MCNC: 3.2 G/DL (ref 3.5–5.2)
ALBUMIN SERPL BCG-MCNC: 3.4 G/DL (ref 3.5–5.2)
ALP SERPL-CCNC: 102 U/L (ref 40–150)
ALP SERPL-CCNC: 111 U/L (ref 40–150)
ALP SERPL-CCNC: 112 U/L (ref 40–150)
ALP SERPL-CCNC: 122 U/L (ref 40–150)
ALT SERPL W P-5'-P-CCNC: 10 U/L (ref 0–50)
ALT SERPL W P-5'-P-CCNC: 10 U/L (ref 0–50)
ALT SERPL W P-5'-P-CCNC: 8 U/L (ref 0–50)
ALT SERPL W P-5'-P-CCNC: 9 U/L (ref 0–50)
ANION GAP SERPL CALCULATED.3IONS-SCNC: 10 MMOL/L (ref 7–15)
ANION GAP SERPL CALCULATED.3IONS-SCNC: 11 MMOL/L (ref 7–15)
ANION GAP SERPL CALCULATED.3IONS-SCNC: 5 MMOL/L (ref 7–15)
ANION GAP SERPL CALCULATED.3IONS-SCNC: 6 MMOL/L (ref 7–15)
ANION GAP SERPL CALCULATED.3IONS-SCNC: 6 MMOL/L (ref 7–15)
ANION GAP SERPL CALCULATED.3IONS-SCNC: 8 MMOL/L (ref 7–15)
ANION GAP SERPL CALCULATED.3IONS-SCNC: 9 MMOL/L (ref 7–15)
ANION GAP SERPL CALCULATED.3IONS-SCNC: 9 MMOL/L (ref 7–15)
AST SERPL W P-5'-P-CCNC: 22 U/L (ref 0–45)
AST SERPL W P-5'-P-CCNC: 22 U/L (ref 0–45)
AST SERPL W P-5'-P-CCNC: 23 U/L (ref 0–45)
AST SERPL W P-5'-P-CCNC: 28 U/L (ref 0–45)
ATRIAL RATE - MUSE: 264 BPM
BASE EXCESS BLDA CALC-SCNC: 16.1 MMOL/L
BASE EXCESS BLDV CALC-SCNC: 13 MMOL/L
BASE EXCESS BLDV CALC-SCNC: 16.9 MMOL/L
BASE EXCESS BLDV CALC-SCNC: 18.4 MMOL/L
BASE EXCESS BLDV CALC-SCNC: 3.4 MMOL/L
BASE EXCESS BLDV CALC-SCNC: 4.3 MMOL/L
BILIRUB SERPL-MCNC: 0.6 MG/DL
BILIRUB SERPL-MCNC: 0.7 MG/DL
BUN SERPL-MCNC: 12.9 MG/DL (ref 8–23)
BUN SERPL-MCNC: 13.1 MG/DL (ref 8–23)
BUN SERPL-MCNC: 14.8 MG/DL (ref 8–23)
BUN SERPL-MCNC: 15.3 MG/DL (ref 8–23)
BUN SERPL-MCNC: 17.3 MG/DL (ref 8–23)
BUN SERPL-MCNC: 24 MG/DL (ref 8–23)
BUN SERPL-MCNC: 30.2 MG/DL (ref 8–23)
BUN SERPL-MCNC: 36.9 MG/DL (ref 8–23)
C PNEUM DNA SPEC QL NAA+PROBE: NOT DETECTED
CA-I BLD-MCNC: 1.1 MMOL/L (ref 1.11–1.3)
CA-I BLD-MCNC: 1.12 MMOL/L (ref 1.11–1.3)
CA-I BLD-MCNC: 1.13 MMOL/L (ref 1.11–1.3)
CA-I BLD-MCNC: 1.14 MMOL/L (ref 1.11–1.3)
CA-I BLD-MCNC: 1.2 MMOL/L (ref 1.11–1.3)
CA-I BLD-MCNC: 1.26 MMOL/L (ref 1.11–1.3)
CALCIUM SERPL-MCNC: 8.5 MG/DL (ref 8.8–10.2)
CALCIUM SERPL-MCNC: 8.8 MG/DL (ref 8.8–10.2)
CALCIUM SERPL-MCNC: 8.8 MG/DL (ref 8.8–10.2)
CALCIUM SERPL-MCNC: 8.9 MG/DL (ref 8.8–10.2)
CALCIUM SERPL-MCNC: 9.1 MG/DL (ref 8.8–10.2)
CALCIUM SERPL-MCNC: 9.2 MG/DL (ref 8.8–10.2)
CHLORIDE SERPL-SCNC: 100 MMOL/L (ref 98–107)
CHLORIDE SERPL-SCNC: 89 MMOL/L (ref 98–107)
CHLORIDE SERPL-SCNC: 90 MMOL/L (ref 98–107)
CHLORIDE SERPL-SCNC: 91 MMOL/L (ref 98–107)
CHLORIDE SERPL-SCNC: 92 MMOL/L (ref 98–107)
CHLORIDE SERPL-SCNC: 95 MMOL/L (ref 98–107)
CHLORIDE SERPL-SCNC: 96 MMOL/L (ref 98–107)
CHLORIDE SERPL-SCNC: 97 MMOL/L (ref 98–107)
COHGB MFR BLD: 100 % (ref 95–96)
CREAT SERPL-MCNC: 0.95 MG/DL (ref 0.51–0.95)
CREAT SERPL-MCNC: 1 MG/DL (ref 0.51–0.95)
CREAT SERPL-MCNC: 1.04 MG/DL (ref 0.51–0.95)
CREAT SERPL-MCNC: 1.05 MG/DL (ref 0.51–0.95)
CREAT SERPL-MCNC: 1.06 MG/DL (ref 0.51–0.95)
CREAT SERPL-MCNC: 1.11 MG/DL (ref 0.51–0.95)
CREAT SERPL-MCNC: 1.15 MG/DL (ref 0.51–0.95)
CREAT SERPL-MCNC: 1.17 MG/DL (ref 0.51–0.95)
DEPRECATED HCO3 PLAS-SCNC: 34 MMOL/L (ref 22–29)
DEPRECATED HCO3 PLAS-SCNC: 34 MMOL/L (ref 22–29)
DEPRECATED HCO3 PLAS-SCNC: 35 MMOL/L (ref 22–29)
DEPRECATED HCO3 PLAS-SCNC: 35 MMOL/L (ref 22–29)
DEPRECATED HCO3 PLAS-SCNC: 37 MMOL/L (ref 22–29)
DEPRECATED HCO3 PLAS-SCNC: 37 MMOL/L (ref 22–29)
DEPRECATED HCO3 PLAS-SCNC: 39 MMOL/L (ref 22–29)
DEPRECATED HCO3 PLAS-SCNC: 40 MMOL/L (ref 22–29)
DIASTOLIC BLOOD PRESSURE - MUSE: NORMAL MMHG
EGFRCR SERPLBLD CKD-EPI 2021: 47 ML/MIN/1.73M2
EGFRCR SERPLBLD CKD-EPI 2021: 48 ML/MIN/1.73M2
EGFRCR SERPLBLD CKD-EPI 2021: 50 ML/MIN/1.73M2
EGFRCR SERPLBLD CKD-EPI 2021: 53 ML/MIN/1.73M2
EGFRCR SERPLBLD CKD-EPI 2021: 53 ML/MIN/1.73M2
EGFRCR SERPLBLD CKD-EPI 2021: 54 ML/MIN/1.73M2
EGFRCR SERPLBLD CKD-EPI 2021: 56 ML/MIN/1.73M2
EGFRCR SERPLBLD CKD-EPI 2021: 60 ML/MIN/1.73M2
ERYTHROCYTE [DISTWIDTH] IN BLOOD BY AUTOMATED COUNT: 15.8 % (ref 10–15)
ERYTHROCYTE [DISTWIDTH] IN BLOOD BY AUTOMATED COUNT: 15.9 % (ref 10–15)
ERYTHROCYTE [DISTWIDTH] IN BLOOD BY AUTOMATED COUNT: 16.4 % (ref 10–15)
ERYTHROCYTE [DISTWIDTH] IN BLOOD BY AUTOMATED COUNT: 17 % (ref 10–15)
ERYTHROCYTE [DISTWIDTH] IN BLOOD BY AUTOMATED COUNT: 17.7 % (ref 10–15)
FLUAV H1 2009 PAND RNA SPEC QL NAA+PROBE: NOT DETECTED
FLUAV H1 RNA SPEC QL NAA+PROBE: NOT DETECTED
FLUAV H3 RNA SPEC QL NAA+PROBE: NOT DETECTED
FLUAV RNA SPEC QL NAA+PROBE: NOT DETECTED
FLUBV RNA SPEC QL NAA+PROBE: NOT DETECTED
GLUCOSE BLD-MCNC: 100 MG/DL (ref 70–125)
GLUCOSE BLD-MCNC: 207 MG/DL (ref 70–125)
GLUCOSE BLD-MCNC: 224 MG/DL (ref 70–125)
GLUCOSE BLD-MCNC: 85 MG/DL (ref 70–125)
GLUCOSE BLD-MCNC: 92 MG/DL (ref 70–125)
GLUCOSE BLD-MCNC: 96 MG/DL (ref 70–125)
GLUCOSE BLDC GLUCOMTR-MCNC: 117 MG/DL (ref 70–99)
GLUCOSE BLDC GLUCOMTR-MCNC: 214 MG/DL (ref 70–99)
GLUCOSE SERPL-MCNC: 100 MG/DL (ref 70–99)
GLUCOSE SERPL-MCNC: 103 MG/DL (ref 70–99)
GLUCOSE SERPL-MCNC: 108 MG/DL (ref 70–99)
GLUCOSE SERPL-MCNC: 85 MG/DL (ref 70–99)
GLUCOSE SERPL-MCNC: 87 MG/DL (ref 70–99)
GLUCOSE SERPL-MCNC: 88 MG/DL (ref 70–99)
GLUCOSE SERPL-MCNC: 93 MG/DL (ref 70–99)
GLUCOSE SERPL-MCNC: 98 MG/DL (ref 70–99)
HADV DNA SPEC QL NAA+PROBE: NOT DETECTED
HCO3 BLDA-SCNC: 39 MMOL/L (ref 23–29)
HCO3 BLDV-SCNC: 26 MMOL/L (ref 24–30)
HCO3 BLDV-SCNC: 27 MMOL/L (ref 24–30)
HCO3 BLDV-SCNC: 35 MMOL/L (ref 24–30)
HCO3 BLDV-SCNC: 38 MMOL/L (ref 24–30)
HCO3 BLDV-SCNC: 40 MMOL/L (ref 24–30)
HCOV PNL SPEC NAA+PROBE: NOT DETECTED
HCT VFR BLD AUTO: 25 % (ref 35–47)
HCT VFR BLD AUTO: 26.1 % (ref 35–47)
HCT VFR BLD AUTO: 26.4 % (ref 35–47)
HCT VFR BLD AUTO: 28.3 % (ref 35–47)
HCT VFR BLD AUTO: 29.7 % (ref 35–47)
HGB BLD-MCNC: 7.5 G/DL (ref 11.7–15.7)
HGB BLD-MCNC: 7.7 G/DL (ref 11.7–15.7)
HGB BLD-MCNC: 7.7 G/DL (ref 11.7–15.7)
HGB BLD-MCNC: 7.8 G/DL (ref 11.7–15.7)
HGB BLD-MCNC: 8 G/DL (ref 11.7–15.7)
HGB BLD-MCNC: 8.4 G/DL (ref 11.7–15.7)
HGB BLD-MCNC: 8.5 G/DL (ref 11.7–15.7)
HGB BLD-MCNC: 8.7 G/DL (ref 11.7–15.7)
HGB BLD-MCNC: 9 G/DL (ref 11.7–15.7)
HGB BLD-MCNC: 9 G/DL (ref 11.7–15.7)
HGB BLD-MCNC: 9.4 G/DL (ref 11.7–15.7)
HMPV RNA SPEC QL NAA+PROBE: NOT DETECTED
HPIV1 RNA SPEC QL NAA+PROBE: NOT DETECTED
HPIV2 RNA SPEC QL NAA+PROBE: NOT DETECTED
HPIV3 RNA SPEC QL NAA+PROBE: NOT DETECTED
HPIV4 RNA SPEC QL NAA+PROBE: NOT DETECTED
INR PPP: 1.69 (ref 0.85–1.15)
INR PPP: 1.74 (ref 0.85–1.15)
INR PPP: 1.94 (ref 0.85–1.15)
INR PPP: 2.35 (ref 0.85–1.15)
INR PPP: 2.36 (ref 0.85–1.15)
INR PPP: 2.51 (ref 0.85–1.15)
INR PPP: 2.72 (ref 0.85–1.15)
INR PPP: 2.85 (ref 0.85–1.15)
INR PPP: 2.89 (ref 0.85–1.15)
INR PPP: 2.89 (ref 0.85–1.15)
INR PPP: 3.33 (ref 0.85–1.15)
INR PPP: 3.33 (ref 0.85–1.15)
INR PPP: 3.66 (ref 0.85–1.15)
INR PPP: 3.95 (ref 0.85–1.15)
INTERPRETATION ECG - MUSE: NORMAL
LACTATE BLD-SCNC: 0.5 MMOL/L (ref 0.7–2)
LACTATE BLD-SCNC: 0.5 MMOL/L (ref 0.7–2)
LACTATE BLD-SCNC: 0.6 MMOL/L (ref 0.7–2)
LACTATE BLD-SCNC: 0.6 MMOL/L (ref 0.7–2)
LACTATE BLD-SCNC: 1 MMOL/L (ref 0.7–2)
LACTATE BLD-SCNC: 1.2 MMOL/L (ref 0.7–2)
M PNEUMO DNA SPEC QL NAA+PROBE: NOT DETECTED
MAGNESIUM SERPL-MCNC: 1.9 MG/DL (ref 1.7–2.3)
MAGNESIUM SERPL-MCNC: 2 MG/DL (ref 1.7–2.3)
MAGNESIUM SERPL-MCNC: 2 MG/DL (ref 1.7–2.3)
MAGNESIUM SERPL-MCNC: 2.2 MG/DL (ref 1.7–2.3)
MAGNESIUM SERPL-MCNC: 2.4 MG/DL (ref 1.7–2.3)
MAGNESIUM SERPL-MCNC: 2.5 MG/DL (ref 1.7–2.3)
MCH RBC QN AUTO: 26.9 PG (ref 26.5–33)
MCH RBC QN AUTO: 27.3 PG (ref 26.5–33)
MCH RBC QN AUTO: 28.4 PG (ref 26.5–33)
MCH RBC QN AUTO: 28.6 PG (ref 26.5–33)
MCH RBC QN AUTO: 28.7 PG (ref 26.5–33)
MCHC RBC AUTO-ENTMCNC: 29.2 G/DL (ref 31.5–36.5)
MCHC RBC AUTO-ENTMCNC: 29.3 G/DL (ref 31.5–36.5)
MCHC RBC AUTO-ENTMCNC: 30 G/DL (ref 31.5–36.5)
MCHC RBC AUTO-ENTMCNC: 30 G/DL (ref 31.5–36.5)
MCHC RBC AUTO-ENTMCNC: 30.7 G/DL (ref 31.5–36.5)
MCV RBC AUTO: 91 FL (ref 78–100)
MCV RBC AUTO: 92 FL (ref 78–100)
MCV RBC AUTO: 93 FL (ref 78–100)
MCV RBC AUTO: 95 FL (ref 78–100)
MCV RBC AUTO: 99 FL (ref 78–100)
NT-PROBNP SERPL-MCNC: 2672 PG/ML (ref 0–1800)
NT-PROBNP SERPL-MCNC: 3675 PG/ML (ref 0–1800)
NT-PROBNP SERPL-MCNC: 3996 PG/ML (ref 0–1800)
NT-PROBNP SERPL-MCNC: 4167 PG/ML (ref 0–1800)
P AXIS - MUSE: -3 DEGREES
PCO2 BLDA: 44 MM HG (ref 35–45)
PCO2 BLDV: 56 MM HG (ref 35–50)
PCO2 BLDV: 58 MM HG (ref 35–50)
PCO2 BLDV: 62 MM HG (ref 35–50)
PCO2 BLDV: 64 MM HG (ref 35–50)
PCO2 BLDV: 83 MM HG (ref 35–50)
PH BLDA: 7.55 [PH] (ref 7.37–7.44)
PH BLDV: 7.19 [PH] (ref 7.35–7.45)
PH BLDV: 7.29 [PH] (ref 7.35–7.45)
PH BLDV: 7.43 [PH] (ref 7.35–7.45)
PH BLDV: 7.45 [PH] (ref 7.35–7.45)
PH BLDV: 7.46 [PH] (ref 7.35–7.45)
PHOSPHATE SERPL-MCNC: 3 MG/DL (ref 2.5–4.5)
PHOSPHATE SERPL-MCNC: 3 MG/DL (ref 2.5–4.5)
PHOSPHATE SERPL-MCNC: 3.2 MG/DL (ref 2.5–4.5)
PHOSPHATE SERPL-MCNC: 3.2 MG/DL (ref 2.5–4.5)
PHOSPHATE SERPL-MCNC: 3.4 MG/DL (ref 2.5–4.5)
PHOSPHATE SERPL-MCNC: 3.4 MG/DL (ref 2.5–4.5)
PHOSPHATE SERPL-MCNC: 3.6 MG/DL (ref 2.5–4.5)
PHOSPHATE SERPL-MCNC: 3.8 MG/DL (ref 2.5–4.5)
PHOSPHATE SERPL-MCNC: 3.9 MG/DL (ref 2.5–4.5)
PHOSPHATE SERPL-MCNC: 4 MG/DL (ref 2.5–4.5)
PHOSPHATE SERPL-MCNC: 4.1 MG/DL (ref 2.5–4.5)
PLATELET # BLD AUTO: 335 10E3/UL (ref 150–450)
PLATELET # BLD AUTO: 371 10E3/UL (ref 150–450)
PLATELET # BLD AUTO: 397 10E3/UL (ref 150–450)
PLATELET # BLD AUTO: 434 10E3/UL (ref 150–450)
PLATELET # BLD AUTO: 438 10E3/UL (ref 150–450)
PO2 BLDA: 124 MM HG (ref 75–85)
PO2 BLDV: 28 MM HG (ref 25–47)
PO2 BLDV: 31 MM HG (ref 25–47)
PO2 BLDV: 33 MM HG (ref 25–47)
PO2 BLDV: 46 MM HG (ref 25–47)
PO2 BLDV: 47 MM HG (ref 25–47)
POTASSIUM BLD-SCNC: 2.6 MMOL/L (ref 3.5–5)
POTASSIUM BLD-SCNC: 3.2 MMOL/L (ref 3.5–5)
POTASSIUM BLD-SCNC: 3.7 MMOL/L (ref 3.5–5)
POTASSIUM BLD-SCNC: 3.8 MMOL/L (ref 3.5–5)
POTASSIUM SERPL-SCNC: 2.7 MMOL/L (ref 3.4–5.3)
POTASSIUM SERPL-SCNC: 2.7 MMOL/L (ref 3.4–5.3)
POTASSIUM SERPL-SCNC: 2.9 MMOL/L (ref 3.4–5.3)
POTASSIUM SERPL-SCNC: 3.3 MMOL/L (ref 3.4–5.3)
POTASSIUM SERPL-SCNC: 3.4 MMOL/L (ref 3.4–5.3)
POTASSIUM SERPL-SCNC: 3.5 MMOL/L (ref 3.4–5.3)
POTASSIUM SERPL-SCNC: 3.5 MMOL/L (ref 3.4–5.3)
POTASSIUM SERPL-SCNC: 3.8 MMOL/L (ref 3.4–5.3)
POTASSIUM SERPL-SCNC: 3.9 MMOL/L (ref 3.4–5.3)
PR INTERVAL - MUSE: NORMAL MS
PROT SERPL-MCNC: 5.8 G/DL (ref 6.4–8.3)
PROT SERPL-MCNC: 6 G/DL (ref 6.4–8.3)
PROT SERPL-MCNC: 6.2 G/DL (ref 6.4–8.3)
PROT SERPL-MCNC: 6.5 G/DL (ref 6.4–8.3)
QRS DURATION - MUSE: 158 MS
QT - MUSE: 452 MS
QTC - MUSE: 473 MS
R AXIS - MUSE: 47 DEGREES
RBC # BLD AUTO: 2.62 10E6/UL (ref 3.8–5.2)
RBC # BLD AUTO: 2.68 10E6/UL (ref 3.8–5.2)
RBC # BLD AUTO: 2.82 10E6/UL (ref 3.8–5.2)
RBC # BLD AUTO: 3.11 10E6/UL (ref 3.8–5.2)
RBC # BLD AUTO: 3.23 10E6/UL (ref 3.8–5.2)
RSV RNA SPEC QL NAA+PROBE: NOT DETECTED
RSV RNA SPEC QL NAA+PROBE: NOT DETECTED
RV+EV RNA SPEC QL NAA+PROBE: NOT DETECTED
SATV LHE POCT: 50.4 % (ref 70–75)
SATV LHE POCT: 56.7 % (ref 70–75)
SATV LHE POCT: 60.4 % (ref 70–75)
SATV LHE POCT: 74.1 % (ref 70–75)
SATV LHE POCT: 76.8 % (ref 70–75)
SODIUM BLD-SCNC: 133 MMOL/L (ref 135–145)
SODIUM BLD-SCNC: 137 MMOL/L (ref 135–145)
SODIUM BLD-SCNC: 138 MMOL/L (ref 135–145)
SODIUM BLD-SCNC: 139 MMOL/L (ref 135–145)
SODIUM BLD-SCNC: 139 MMOL/L (ref 135–145)
SODIUM BLD-SCNC: 141 MMOL/L (ref 135–145)
SODIUM SERPL-SCNC: 134 MMOL/L (ref 135–145)
SODIUM SERPL-SCNC: 135 MMOL/L (ref 135–145)
SODIUM SERPL-SCNC: 136 MMOL/L (ref 135–145)
SODIUM SERPL-SCNC: 136 MMOL/L (ref 135–145)
SODIUM SERPL-SCNC: 138 MMOL/L (ref 135–145)
SODIUM SERPL-SCNC: 140 MMOL/L (ref 135–145)
SODIUM SERPL-SCNC: 141 MMOL/L (ref 135–145)
SODIUM SERPL-SCNC: 145 MMOL/L (ref 135–145)
SYSTOLIC BLOOD PRESSURE - MUSE: NORMAL MMHG
T AXIS - MUSE: 80 DEGREES
VENTRICULAR RATE- MUSE: 66 BPM
WBC # BLD AUTO: 11.1 10E3/UL (ref 4–11)
WBC # BLD AUTO: 6.8 10E3/UL (ref 4–11)
WBC # BLD AUTO: 8.1 10E3/UL (ref 4–11)
WBC # BLD AUTO: 8.3 10E3/UL (ref 4–11)
WBC # BLD AUTO: 8.7 10E3/UL (ref 4–11)

## 2024-01-01 PROCEDURE — 84295 ASSAY OF SERUM SODIUM: CPT | Performed by: INTERNAL MEDICINE

## 2024-01-01 PROCEDURE — 97110 THERAPEUTIC EXERCISES: CPT | Mod: GO

## 2024-01-01 PROCEDURE — 999N000253 HC STATISTIC WEANING TRIALS

## 2024-01-01 PROCEDURE — 99233 SBSQ HOSP IP/OBS HIGH 50: CPT | Performed by: HOSPITALIST

## 2024-01-01 PROCEDURE — 84100 ASSAY OF PHOSPHORUS: CPT | Performed by: HOSPITALIST

## 2024-01-01 PROCEDURE — 97164 PT RE-EVAL EST PLAN CARE: CPT | Mod: GP

## 2024-01-01 PROCEDURE — 120N000017 HC R&B RESPIRATORY CARE

## 2024-01-01 PROCEDURE — 97110 THERAPEUTIC EXERCISES: CPT | Mod: GO | Performed by: OCCUPATIONAL THERAPIST

## 2024-01-01 PROCEDURE — 250N000011 HC RX IP 250 OP 636: Performed by: HOSPITALIST

## 2024-01-01 PROCEDURE — 99232 SBSQ HOSP IP/OBS MODERATE 35: CPT | Performed by: NURSE PRACTITIONER

## 2024-01-01 PROCEDURE — 94003 VENT MGMT INPAT SUBQ DAY: CPT | Performed by: NURSE PRACTITIONER

## 2024-01-01 PROCEDURE — 93005 ELECTROCARDIOGRAM TRACING: CPT | Performed by: STUDENT IN AN ORGANIZED HEALTH CARE EDUCATION/TRAINING PROGRAM

## 2024-01-01 PROCEDURE — 84132 ASSAY OF SERUM POTASSIUM: CPT | Performed by: INTERNAL MEDICINE

## 2024-01-01 PROCEDURE — 85610 PROTHROMBIN TIME: CPT | Performed by: INTERNAL MEDICINE

## 2024-01-01 PROCEDURE — 999N000157 HC STATISTIC RCP TIME EA 10 MIN

## 2024-01-01 PROCEDURE — 84132 ASSAY OF SERUM POTASSIUM: CPT | Performed by: HOSPITALIST

## 2024-01-01 PROCEDURE — 999N000123 HC STATISTIC OXYGEN O2DAILY TECH TIME

## 2024-01-01 PROCEDURE — 999N000009 HC STATISTIC AIRWAY CARE

## 2024-01-01 PROCEDURE — 250N000009 HC RX 250: Performed by: NURSE PRACTITIONER

## 2024-01-01 PROCEDURE — 80048 BASIC METABOLIC PNL TOTAL CA: CPT | Performed by: STUDENT IN AN ORGANIZED HEALTH CARE EDUCATION/TRAINING PROGRAM

## 2024-01-01 PROCEDURE — 85027 COMPLETE CBC AUTOMATED: CPT | Performed by: INTERNAL MEDICINE

## 2024-01-01 PROCEDURE — 83880 ASSAY OF NATRIURETIC PEPTIDE: CPT | Performed by: HOSPITALIST

## 2024-01-01 PROCEDURE — 84100 ASSAY OF PHOSPHORUS: CPT | Performed by: INTERNAL MEDICINE

## 2024-01-01 PROCEDURE — 94799 UNLISTED PULMONARY SVC/PX: CPT

## 2024-01-01 PROCEDURE — 97129 THER IVNTJ 1ST 15 MIN: CPT | Mod: GO | Performed by: OCCUPATIONAL THERAPIST

## 2024-01-01 PROCEDURE — 83880 ASSAY OF NATRIURETIC PEPTIDE: CPT | Performed by: NURSE PRACTITIONER

## 2024-01-01 PROCEDURE — 250N000013 HC RX MED GY IP 250 OP 250 PS 637

## 2024-01-01 PROCEDURE — 97530 THERAPEUTIC ACTIVITIES: CPT | Mod: GP | Performed by: PHYSICAL THERAPIST

## 2024-01-01 PROCEDURE — 97110 THERAPEUTIC EXERCISES: CPT | Mod: GP | Performed by: PHYSICAL THERAPIST

## 2024-01-01 PROCEDURE — 0BP1XFZ REMOVAL OF TRACHEOSTOMY DEVICE FROM TRACHEA, EXTERNAL APPROACH: ICD-10-PCS | Performed by: NURSE PRACTITIONER

## 2024-01-01 PROCEDURE — 99207 PR APP CREDIT; MD BILLING SHARED VISIT: CPT | Performed by: STUDENT IN AN ORGANIZED HEALTH CARE EDUCATION/TRAINING PROGRAM

## 2024-01-01 PROCEDURE — 999N000150 HC STATISTIC PT MED CONFERENCE < 30 MIN

## 2024-01-01 PROCEDURE — 97530 THERAPEUTIC ACTIVITIES: CPT | Mod: GP

## 2024-01-01 PROCEDURE — 92526 ORAL FUNCTION THERAPY: CPT | Mod: GN | Performed by: SPEECH-LANGUAGE PATHOLOGIST

## 2024-01-01 PROCEDURE — 80048 BASIC METABOLIC PNL TOTAL CA: CPT | Performed by: NURSE PRACTITIONER

## 2024-01-01 PROCEDURE — 2894A VOIDCORRECT: CPT | Performed by: STUDENT IN AN ORGANIZED HEALTH CARE EDUCATION/TRAINING PROGRAM

## 2024-01-01 PROCEDURE — 250N000011 HC RX IP 250 OP 636: Mod: JZ | Performed by: STUDENT IN AN ORGANIZED HEALTH CARE EDUCATION/TRAINING PROGRAM

## 2024-01-01 PROCEDURE — 250N000013 HC RX MED GY IP 250 OP 250 PS 637: Performed by: STUDENT IN AN ORGANIZED HEALTH CARE EDUCATION/TRAINING PROGRAM

## 2024-01-01 PROCEDURE — 92526 ORAL FUNCTION THERAPY: CPT | Mod: GN

## 2024-01-01 PROCEDURE — 82330 ASSAY OF CALCIUM: CPT

## 2024-01-01 PROCEDURE — 250N000011 HC RX IP 250 OP 636: Performed by: INTERNAL MEDICINE

## 2024-01-01 PROCEDURE — 99418 PROLNG IP/OBS E/M EA 15 MIN: CPT | Performed by: HOSPITALIST

## 2024-01-01 PROCEDURE — 74230 X-RAY XM SWLNG FUNCJ C+: CPT

## 2024-01-01 PROCEDURE — 250N000013 HC RX MED GY IP 250 OP 250 PS 637: Performed by: HOSPITALIST

## 2024-01-01 PROCEDURE — 250N000011 HC RX IP 250 OP 636

## 2024-01-01 PROCEDURE — 85027 COMPLETE CBC AUTOMATED: CPT | Performed by: STUDENT IN AN ORGANIZED HEALTH CARE EDUCATION/TRAINING PROGRAM

## 2024-01-01 PROCEDURE — 94640 AIRWAY INHALATION TREATMENT: CPT

## 2024-01-01 PROCEDURE — 999N000054 HC STATISTIC EKG NON-CHARGEABLE

## 2024-01-01 PROCEDURE — 94640 AIRWAY INHALATION TREATMENT: CPT | Mod: 76

## 2024-01-01 PROCEDURE — 97130 THER IVNTJ EA ADDL 15 MIN: CPT | Mod: GO | Performed by: OCCUPATIONAL THERAPIST

## 2024-01-01 PROCEDURE — 99231 SBSQ HOSP IP/OBS SF/LOW 25: CPT | Performed by: NURSE PRACTITIONER

## 2024-01-01 PROCEDURE — 84295 ASSAY OF SERUM SODIUM: CPT

## 2024-01-01 PROCEDURE — 97535 SELF CARE MNGMENT TRAINING: CPT | Mod: GO

## 2024-01-01 PROCEDURE — 97110 THERAPEUTIC EXERCISES: CPT | Mod: GP

## 2024-01-01 PROCEDURE — 80053 COMPREHEN METABOLIC PANEL: CPT | Performed by: INTERNAL MEDICINE

## 2024-01-01 PROCEDURE — 83735 ASSAY OF MAGNESIUM: CPT | Performed by: INTERNAL MEDICINE

## 2024-01-01 PROCEDURE — 250N000011 HC RX IP 250 OP 636: Performed by: STUDENT IN AN ORGANIZED HEALTH CARE EDUCATION/TRAINING PROGRAM

## 2024-01-01 PROCEDURE — 250N000013 HC RX MED GY IP 250 OP 250 PS 637: Performed by: INTERNAL MEDICINE

## 2024-01-01 PROCEDURE — 999N000215 HC STATISTIC HFNC ADULT NON-CPAP

## 2024-01-01 PROCEDURE — 93010 ELECTROCARDIOGRAM REPORT: CPT | Performed by: INTERNAL MEDICINE

## 2024-01-01 PROCEDURE — 71045 X-RAY EXAM CHEST 1 VIEW: CPT

## 2024-01-01 PROCEDURE — 92611 MOTION FLUOROSCOPY/SWALLOW: CPT | Mod: GN | Performed by: SPEECH-LANGUAGE PATHOLOGIST

## 2024-01-01 PROCEDURE — 94660 CPAP INITIATION&MGMT: CPT

## 2024-01-01 PROCEDURE — 80048 BASIC METABOLIC PNL TOTAL CA: CPT | Performed by: HOSPITALIST

## 2024-01-01 PROCEDURE — 97116 GAIT TRAINING THERAPY: CPT | Mod: GP

## 2024-01-01 PROCEDURE — 71046 X-RAY EXAM CHEST 2 VIEWS: CPT

## 2024-01-01 PROCEDURE — 84132 ASSAY OF SERUM POTASSIUM: CPT | Performed by: STUDENT IN AN ORGANIZED HEALTH CARE EDUCATION/TRAINING PROGRAM

## 2024-01-01 PROCEDURE — 999N000125 HC STATISTIC PATIENT MED CONFERENCE < 30 MIN: Performed by: OCCUPATIONAL THERAPIST

## 2024-01-01 PROCEDURE — 99233 SBSQ HOSP IP/OBS HIGH 50: CPT | Performed by: INTERNAL MEDICINE

## 2024-01-01 PROCEDURE — 97530 THERAPEUTIC ACTIVITIES: CPT | Mod: GO

## 2024-01-01 PROCEDURE — 82310 ASSAY OF CALCIUM: CPT | Performed by: STUDENT IN AN ORGANIZED HEALTH CARE EDUCATION/TRAINING PROGRAM

## 2024-01-01 PROCEDURE — 97535 SELF CARE MNGMENT TRAINING: CPT | Mod: GP

## 2024-01-01 PROCEDURE — 83735 ASSAY OF MAGNESIUM: CPT | Performed by: HOSPITALIST

## 2024-01-01 PROCEDURE — 93005 ELECTROCARDIOGRAM TRACING: CPT

## 2024-01-01 PROCEDURE — 83880 ASSAY OF NATRIURETIC PEPTIDE: CPT | Performed by: STUDENT IN AN ORGANIZED HEALTH CARE EDUCATION/TRAINING PROGRAM

## 2024-01-01 PROCEDURE — 250N000009 HC RX 250: Performed by: STUDENT IN AN ORGANIZED HEALTH CARE EDUCATION/TRAINING PROGRAM

## 2024-01-01 PROCEDURE — 94003 VENT MGMT INPAT SUBQ DAY: CPT

## 2024-01-01 PROCEDURE — 97116 GAIT TRAINING THERAPY: CPT | Mod: GP | Performed by: PHYSICAL THERAPIST

## 2024-01-01 PROCEDURE — 999N000258 HC STATISTIC TRACH CHANGE

## 2024-01-01 PROCEDURE — 87633 RESP VIRUS 12-25 TARGETS: CPT | Performed by: STUDENT IN AN ORGANIZED HEALTH CARE EDUCATION/TRAINING PROGRAM

## 2024-01-01 PROCEDURE — 97535 SELF CARE MNGMENT TRAINING: CPT | Mod: GO | Performed by: OCCUPATIONAL THERAPIST

## 2024-01-01 PROCEDURE — 999N000125 HC STATISTIC PATIENT MED CONFERENCE < 30 MIN

## 2024-01-01 RX ORDER — BUMETANIDE 1 MG/1
2 TABLET ORAL DAILY
Status: DISCONTINUED | OUTPATIENT
Start: 2024-01-01 | End: 2024-01-01

## 2024-01-01 RX ORDER — POTASSIUM CHLORIDE 750 MG/1
40 TABLET, EXTENDED RELEASE ORAL 2 TIMES DAILY
Status: DISCONTINUED | OUTPATIENT
Start: 2024-01-01 | End: 2024-01-01

## 2024-01-01 RX ORDER — WARFARIN SODIUM 1 MG/1
1 TABLET ORAL
Status: COMPLETED | OUTPATIENT
Start: 2024-01-01 | End: 2024-01-01

## 2024-01-01 RX ORDER — ALBUTEROL SULFATE 5 MG/ML
2.5 SOLUTION RESPIRATORY (INHALATION) EVERY 6 HOURS PRN
Status: DISCONTINUED | OUTPATIENT
Start: 2024-01-01 | End: 2024-01-01

## 2024-01-01 RX ORDER — BUMETANIDE 0.25 MG/ML
1.5 INJECTION INTRAMUSCULAR; INTRAVENOUS EVERY 12 HOURS
Status: DISCONTINUED | OUTPATIENT
Start: 2024-01-01 | End: 2024-01-01

## 2024-01-01 RX ORDER — WARFARIN SODIUM 2 MG/1
2 TABLET ORAL
Status: DISCONTINUED | OUTPATIENT
Start: 2024-01-01 | End: 2024-01-01

## 2024-01-01 RX ORDER — LOPERAMIDE HCL 2 MG
2 CAPSULE ORAL 2 TIMES DAILY
Status: DISCONTINUED | OUTPATIENT
Start: 2024-01-01 | End: 2024-01-01

## 2024-01-01 RX ORDER — ACETAMINOPHEN 325 MG/1
650 TABLET ORAL EVERY 4 HOURS PRN
Status: DISCONTINUED | OUTPATIENT
Start: 2024-01-01 | End: 2024-01-15 | Stop reason: HOSPADM

## 2024-01-01 RX ORDER — MIRTAZAPINE 15 MG/1
15 TABLET, FILM COATED ORAL AT BEDTIME
Status: DISCONTINUED | OUTPATIENT
Start: 2024-01-01 | End: 2024-01-01

## 2024-01-01 RX ORDER — AMOXICILLIN 250 MG
1 CAPSULE ORAL 2 TIMES DAILY PRN
Status: DISCONTINUED | OUTPATIENT
Start: 2024-01-01 | End: 2024-01-01

## 2024-01-01 RX ORDER — HYDROXYZINE HYDROCHLORIDE 10 MG/1
10 TABLET, FILM COATED ORAL
Status: DISCONTINUED | OUTPATIENT
Start: 2024-01-01 | End: 2024-01-01

## 2024-01-01 RX ORDER — WARFARIN SODIUM 2 MG/1
2 TABLET ORAL
Status: COMPLETED | OUTPATIENT
Start: 2024-01-01 | End: 2024-01-01

## 2024-01-01 RX ORDER — METOLAZONE 5 MG/1
5 TABLET ORAL ONCE
Status: COMPLETED | OUTPATIENT
Start: 2024-01-01 | End: 2024-01-01

## 2024-01-01 RX ORDER — ALBUTEROL SULFATE 0.83 MG/ML
2.5 SOLUTION RESPIRATORY (INHALATION) EVERY 6 HOURS PRN
Status: DISCONTINUED | OUTPATIENT
Start: 2024-01-01 | End: 2024-01-01

## 2024-01-01 RX ORDER — HYDRALAZINE HYDROCHLORIDE 20 MG/ML
10 INJECTION INTRAMUSCULAR; INTRAVENOUS ONCE
Status: COMPLETED | OUTPATIENT
Start: 2024-01-01 | End: 2024-01-01

## 2024-01-01 RX ORDER — POTASSIUM CHLORIDE 750 MG/1
30 TABLET, EXTENDED RELEASE ORAL 2 TIMES DAILY
Status: DISCONTINUED | OUTPATIENT
Start: 2024-01-01 | End: 2024-01-01

## 2024-01-01 RX ORDER — LOPERAMIDE HCL 2 MG
2 CAPSULE ORAL ONCE
Status: COMPLETED | OUTPATIENT
Start: 2024-01-01 | End: 2024-01-01

## 2024-01-01 RX ORDER — WARFARIN SODIUM 2.5 MG/1
2.5 TABLET ORAL
Status: COMPLETED | OUTPATIENT
Start: 2024-01-01 | End: 2024-01-01

## 2024-01-01 RX ORDER — LORAZEPAM 2 MG/ML
0.5 INJECTION INTRAMUSCULAR EVERY 4 HOURS PRN
Status: DISCONTINUED | OUTPATIENT
Start: 2024-01-01 | End: 2024-01-01

## 2024-01-01 RX ORDER — POTASSIUM CHLORIDE 750 MG/1
10 TABLET, EXTENDED RELEASE ORAL ONCE
Status: DISCONTINUED | OUTPATIENT
Start: 2024-01-01 | End: 2024-01-01

## 2024-01-01 RX ORDER — SIMETHICONE 80 MG
40 TABLET,CHEWABLE ORAL EVERY 6 HOURS PRN
Status: DISCONTINUED | OUTPATIENT
Start: 2024-01-01 | End: 2024-01-01

## 2024-01-01 RX ORDER — MORPHINE SULFATE 20 MG/ML
10 SOLUTION ORAL
Status: DISCONTINUED | OUTPATIENT
Start: 2024-01-01 | End: 2024-01-15 | Stop reason: HOSPADM

## 2024-01-01 RX ORDER — POLYETHYLENE GLYCOL 3350 17 G/17G
17 POWDER, FOR SOLUTION ORAL DAILY PRN
Status: DISCONTINUED | OUTPATIENT
Start: 2024-01-01 | End: 2024-01-01

## 2024-01-01 RX ORDER — MORPHINE SULFATE 10 MG/5ML
5 SOLUTION ORAL
Status: DISCONTINUED | OUTPATIENT
Start: 2024-01-01 | End: 2024-01-15 | Stop reason: HOSPADM

## 2024-01-01 RX ORDER — POTASSIUM CHLORIDE 1500 MG/1
20 TABLET, EXTENDED RELEASE ORAL 2 TIMES DAILY
Status: DISCONTINUED | OUTPATIENT
Start: 2024-01-01 | End: 2024-01-01

## 2024-01-01 RX ORDER — LORAZEPAM 0.5 MG/1
0.5 TABLET ORAL
Status: DISCONTINUED | OUTPATIENT
Start: 2024-01-01 | End: 2024-01-15 | Stop reason: HOSPADM

## 2024-01-01 RX ORDER — POTASSIUM CHLORIDE 1500 MG/1
60 TABLET, EXTENDED RELEASE ORAL ONCE
Status: DISCONTINUED | OUTPATIENT
Start: 2024-01-01 | End: 2024-01-01

## 2024-01-01 RX ORDER — ALBUTEROL SULFATE 0.83 MG/ML
2.5 SOLUTION RESPIRATORY (INHALATION) ONCE
Status: COMPLETED | OUTPATIENT
Start: 2024-01-01 | End: 2024-01-01

## 2024-01-01 RX ORDER — LORAZEPAM 2 MG/ML
0.5 INJECTION INTRAMUSCULAR
Status: DISCONTINUED | OUTPATIENT
Start: 2024-01-01 | End: 2024-01-15 | Stop reason: HOSPADM

## 2024-01-01 RX ORDER — BARIUM SULFATE 400 MG/ML
SUSPENSION ORAL ONCE
Status: COMPLETED | OUTPATIENT
Start: 2024-01-01 | End: 2024-01-01

## 2024-01-01 RX ORDER — MORPHINE SULFATE 20 MG/ML
5 SOLUTION ORAL
Status: DISCONTINUED | OUTPATIENT
Start: 2024-01-01 | End: 2024-01-15 | Stop reason: HOSPADM

## 2024-01-01 RX ORDER — SACCHAROMYCES BOULARDII 250 MG
250 CAPSULE ORAL 2 TIMES DAILY
Status: DISCONTINUED | OUTPATIENT
Start: 2024-01-01 | End: 2024-01-01

## 2024-01-01 RX ORDER — ACETAMINOPHEN 650 MG/1
650 SUPPOSITORY RECTAL EVERY 4 HOURS PRN
Status: DISCONTINUED | OUTPATIENT
Start: 2024-01-01 | End: 2024-01-15 | Stop reason: HOSPADM

## 2024-01-01 RX ORDER — BUMETANIDE 0.25 MG/ML
1 INJECTION INTRAMUSCULAR; INTRAVENOUS ONCE
Status: COMPLETED | OUTPATIENT
Start: 2024-01-01 | End: 2024-01-01

## 2024-01-01 RX ORDER — WARFARIN SODIUM 3 MG/1
3 TABLET ORAL
Status: COMPLETED | OUTPATIENT
Start: 2024-01-01 | End: 2024-01-01

## 2024-01-01 RX ORDER — POTASSIUM CHLORIDE 1500 MG/1
40 TABLET, EXTENDED RELEASE ORAL ONCE
Status: COMPLETED | OUTPATIENT
Start: 2024-01-01 | End: 2024-01-01

## 2024-01-01 RX ORDER — PANTOPRAZOLE SODIUM 40 MG/1
40 TABLET, DELAYED RELEASE ORAL
Status: DISCONTINUED | OUTPATIENT
Start: 2024-01-01 | End: 2024-01-01

## 2024-01-01 RX ORDER — ESCITALOPRAM OXALATE 10 MG/1
10 TABLET ORAL DAILY
Status: DISCONTINUED | OUTPATIENT
Start: 2024-01-01 | End: 2024-01-01

## 2024-01-01 RX ORDER — ALBUTEROL SULFATE 0.83 MG/ML
2.5 SOLUTION RESPIRATORY (INHALATION) 3 TIMES DAILY PRN
Status: DISCONTINUED | OUTPATIENT
Start: 2024-01-01 | End: 2024-01-01

## 2024-01-01 RX ORDER — ACETAZOLAMIDE 500 MG/5ML
250 INJECTION, POWDER, LYOPHILIZED, FOR SOLUTION INTRAVENOUS ONCE
Status: COMPLETED | OUTPATIENT
Start: 2024-01-01 | End: 2024-01-01

## 2024-01-01 RX ORDER — MULTIPLE VITAMINS W/ MINERALS TAB 9MG-400MCG
1 TAB ORAL DAILY
Status: DISCONTINUED | OUTPATIENT
Start: 2024-01-01 | End: 2024-01-01

## 2024-01-01 RX ORDER — ALBUTEROL SULFATE 0.83 MG/ML
2.5 SOLUTION RESPIRATORY (INHALATION) EVERY 4 HOURS PRN
Status: DISCONTINUED | OUTPATIENT
Start: 2024-01-01 | End: 2024-01-01

## 2024-01-01 RX ORDER — LOPERAMIDE HCL 2 MG
2 CAPSULE ORAL 3 TIMES DAILY PRN
Status: DISCONTINUED | OUTPATIENT
Start: 2024-01-01 | End: 2024-01-01

## 2024-01-01 RX ORDER — ALBUTEROL SULFATE 0.83 MG/ML
2.5 SOLUTION RESPIRATORY (INHALATION) 4 TIMES DAILY
Status: DISCONTINUED | OUTPATIENT
Start: 2024-01-01 | End: 2024-01-01

## 2024-01-01 RX ORDER — HYDROXYZINE HYDROCHLORIDE 25 MG/1
25 TABLET, FILM COATED ORAL 2 TIMES DAILY PRN
Status: DISCONTINUED | OUTPATIENT
Start: 2024-01-01 | End: 2024-01-01

## 2024-01-01 RX ORDER — POTASSIUM CHLORIDE 1500 MG/1
40 TABLET, EXTENDED RELEASE ORAL 3 TIMES DAILY
Qty: 4 TABLET | Refills: 0 | Status: DISCONTINUED | OUTPATIENT
Start: 2024-01-01 | End: 2024-01-01

## 2024-01-01 RX ORDER — ASPIRIN 81 MG/1
81 TABLET, CHEWABLE ORAL DAILY
Status: DISCONTINUED | OUTPATIENT
Start: 2024-01-01 | End: 2024-01-01

## 2024-01-01 RX ORDER — MIDODRINE HYDROCHLORIDE 5 MG/1
5 TABLET ORAL 3 TIMES DAILY PRN
Status: DISCONTINUED | OUTPATIENT
Start: 2024-01-01 | End: 2024-01-01

## 2024-01-01 RX ORDER — POTASSIUM CHLORIDE 1500 MG/1
20 TABLET, EXTENDED RELEASE ORAL ONCE
Status: COMPLETED | OUTPATIENT
Start: 2024-01-01 | End: 2024-01-01

## 2024-01-01 RX ORDER — MORPHINE SULFATE 10 MG/5ML
10 SOLUTION ORAL
Status: DISCONTINUED | OUTPATIENT
Start: 2024-01-01 | End: 2024-01-15 | Stop reason: HOSPADM

## 2024-01-01 RX ORDER — HYDRALAZINE HYDROCHLORIDE 20 MG/ML
INJECTION INTRAMUSCULAR; INTRAVENOUS
Status: COMPLETED
Start: 2024-01-01 | End: 2024-01-01

## 2024-01-01 RX ORDER — HYDRALAZINE HYDROCHLORIDE 20 MG/ML
10 INJECTION INTRAMUSCULAR; INTRAVENOUS EVERY 6 HOURS PRN
Status: DISCONTINUED | OUTPATIENT
Start: 2024-01-01 | End: 2024-01-01

## 2024-01-01 RX ORDER — ALBUTEROL SULFATE 0.83 MG/ML
2.5 SOLUTION RESPIRATORY (INHALATION)
Status: DISCONTINUED | OUTPATIENT
Start: 2024-01-01 | End: 2024-01-01

## 2024-01-01 RX ADMIN — ESCITALOPRAM 10 MG: 10 TABLET, FILM COATED ORAL at 08:43

## 2024-01-01 RX ADMIN — WARFARIN SODIUM 1.5 MG: 3 TABLET ORAL at 17:02

## 2024-01-01 RX ADMIN — Medication 1 TABLET: at 08:57

## 2024-01-01 RX ADMIN — POTASSIUM CHLORIDE 30 MEQ: 750 TABLET, EXTENDED RELEASE ORAL at 22:01

## 2024-01-01 RX ADMIN — MICONAZOLE NITRATE: 20 POWDER TOPICAL at 08:44

## 2024-01-01 RX ADMIN — ESCITALOPRAM 10 MG: 5 SOLUTION ORAL at 09:08

## 2024-01-01 RX ADMIN — MIRTAZAPINE 15 MG: 15 TABLET, FILM COATED ORAL at 21:08

## 2024-01-01 RX ADMIN — POTASSIUM CHLORIDE 20 MEQ: 1500 TABLET, EXTENDED RELEASE ORAL at 08:29

## 2024-01-01 RX ADMIN — Medication 250 MG: at 21:02

## 2024-01-01 RX ADMIN — Medication 1 TABLET: at 09:09

## 2024-01-01 RX ADMIN — ANORECTAL OINTMENT: 15.7; .44; 24; 20.6 OINTMENT TOPICAL at 08:58

## 2024-01-01 RX ADMIN — Medication 5 MG: at 20:17

## 2024-01-01 RX ADMIN — Medication 250 MG: at 22:01

## 2024-01-01 RX ADMIN — POTASSIUM CHLORIDE 20 MEQ: 1.5 SOLUTION ORAL at 20:27

## 2024-01-01 RX ADMIN — SODIUM BICARBONATE 325 MG: 650 TABLET ORAL at 09:57

## 2024-01-01 RX ADMIN — WARFARIN SODIUM 3 MG: 3 TABLET ORAL at 18:01

## 2024-01-01 RX ADMIN — DIPHENHYDRAMINE HCL 25 MG: 25 TABLET, COATED ORAL at 22:11

## 2024-01-01 RX ADMIN — MIRTAZAPINE 15 MG: 15 TABLET, FILM COATED ORAL at 22:01

## 2024-01-01 RX ADMIN — MORPHINE SULFATE 5 MG: 10 SOLUTION ORAL at 20:00

## 2024-01-01 RX ADMIN — ASPIRIN 81 MG: 81 TABLET, CHEWABLE ORAL at 09:09

## 2024-01-01 RX ADMIN — PANTOPRAZOLE SODIUM 40 MG: 40 TABLET, DELAYED RELEASE ORAL at 06:57

## 2024-01-01 RX ADMIN — WARFARIN SODIUM 1 MG: 1 TABLET ORAL at 18:35

## 2024-01-01 RX ADMIN — BUMETANIDE 2 MG: 0.25 INJECTION INTRAMUSCULAR; INTRAVENOUS at 23:20

## 2024-01-01 RX ADMIN — Medication 1 TABLET: at 08:37

## 2024-01-01 RX ADMIN — MICONAZOLE NITRATE: 20 POWDER TOPICAL at 20:06

## 2024-01-01 RX ADMIN — Medication 250 MG: at 08:37

## 2024-01-01 RX ADMIN — WARFARIN SODIUM 1 MG: 1 TABLET ORAL at 17:24

## 2024-01-01 RX ADMIN — Medication 1 TABLET: at 08:59

## 2024-01-01 RX ADMIN — POTASSIUM CHLORIDE 30 MEQ: 750 TABLET, EXTENDED RELEASE ORAL at 20:48

## 2024-01-01 RX ADMIN — POTASSIUM CHLORIDE 40 MEQ: 750 TABLET, EXTENDED RELEASE ORAL at 08:37

## 2024-01-01 RX ADMIN — BUMETANIDE 2 MG: 1 TABLET ORAL at 08:37

## 2024-01-01 RX ADMIN — Medication 250 MG: at 20:42

## 2024-01-01 RX ADMIN — PANCRELIPASE LIPASE, PANCRELIPASE PROTEASE, PANCRELIPASE AMYLASE 1 CAPSULE: 5000; 17000; 24000 CAPSULE, DELAYED RELEASE ORAL at 13:19

## 2024-01-01 RX ADMIN — Medication 5 MG: at 20:42

## 2024-01-01 RX ADMIN — POTASSIUM CHLORIDE 20 MEQ: 1500 TABLET, EXTENDED RELEASE ORAL at 09:10

## 2024-01-01 RX ADMIN — MICONAZOLE NITRATE: 20 POWDER TOPICAL at 21:01

## 2024-01-01 RX ADMIN — MICONAZOLE NITRATE: 20 POWDER TOPICAL at 21:08

## 2024-01-01 RX ADMIN — ALBUTEROL SULFATE 2.5 MG: 2.5 SOLUTION RESPIRATORY (INHALATION) at 07:51

## 2024-01-01 RX ADMIN — Medication 1 TABLET: at 09:10

## 2024-01-01 RX ADMIN — ASPIRIN 81 MG: 81 TABLET, CHEWABLE ORAL at 08:29

## 2024-01-01 RX ADMIN — MICONAZOLE NITRATE: 20 POWDER TOPICAL at 20:47

## 2024-01-01 RX ADMIN — PANTOPRAZOLE SODIUM 40 MG: 40 TABLET, DELAYED RELEASE ORAL at 06:43

## 2024-01-01 RX ADMIN — ESCITALOPRAM 10 MG: 10 TABLET, FILM COATED ORAL at 08:29

## 2024-01-01 RX ADMIN — POTASSIUM CHLORIDE 20 MEQ: 1500 TABLET, EXTENDED RELEASE ORAL at 08:58

## 2024-01-01 RX ADMIN — BUMETANIDE 1.5 MG: 0.25 INJECTION INTRAMUSCULAR; INTRAVENOUS at 19:58

## 2024-01-01 RX ADMIN — ASPIRIN 81 MG: 81 TABLET, CHEWABLE ORAL at 08:43

## 2024-01-01 RX ADMIN — Medication 1 TABLET: at 09:19

## 2024-01-01 RX ADMIN — ASPIRIN 81 MG: 81 TABLET, CHEWABLE ORAL at 09:19

## 2024-01-01 RX ADMIN — Medication 5 MG: at 20:28

## 2024-01-01 RX ADMIN — POTASSIUM CHLORIDE 20 MEQ: 1500 TABLET, EXTENDED RELEASE ORAL at 11:59

## 2024-01-01 RX ADMIN — WARFARIN SODIUM 1 MG: 1 TABLET ORAL at 17:57

## 2024-01-01 RX ADMIN — BUMETANIDE 1.5 MG: 0.25 INJECTION INTRAMUSCULAR; INTRAVENOUS at 08:25

## 2024-01-01 RX ADMIN — MIRTAZAPINE 15 MG: 15 TABLET, FILM COATED ORAL at 20:49

## 2024-01-01 RX ADMIN — BUMETANIDE 1.5 MG: 0.25 INJECTION INTRAMUSCULAR; INTRAVENOUS at 12:38

## 2024-01-01 RX ADMIN — ASPIRIN 81 MG: 81 TABLET, CHEWABLE ORAL at 08:37

## 2024-01-01 RX ADMIN — ACETYLCYSTEINE 2 ML: 200 SOLUTION ORAL; RESPIRATORY (INHALATION) at 00:20

## 2024-01-01 RX ADMIN — Medication 250 MG: at 21:08

## 2024-01-01 RX ADMIN — PANTOPRAZOLE SODIUM 40 MG: 40 TABLET, DELAYED RELEASE ORAL at 06:48

## 2024-01-01 RX ADMIN — MICONAZOLE NITRATE: 20 POWDER TOPICAL at 09:00

## 2024-01-01 RX ADMIN — BUMETANIDE 1.5 MG: 0.25 INJECTION INTRAMUSCULAR; INTRAVENOUS at 06:09

## 2024-01-01 RX ADMIN — MIRTAZAPINE 15 MG: 15 TABLET, FILM COATED ORAL at 20:40

## 2024-01-01 RX ADMIN — Medication 2 MG: at 09:08

## 2024-01-01 RX ADMIN — BUMETANIDE 2 MG: 0.25 INJECTION INTRAMUSCULAR; INTRAVENOUS at 13:46

## 2024-01-01 RX ADMIN — POTASSIUM CHLORIDE 30 MEQ: 750 TABLET, EXTENDED RELEASE ORAL at 08:43

## 2024-01-01 RX ADMIN — PANTOPRAZOLE SODIUM 40 MG: 40 TABLET, DELAYED RELEASE ORAL at 06:49

## 2024-01-01 RX ADMIN — BUMETANIDE 1 MG: 0.25 INJECTION INTRAMUSCULAR; INTRAVENOUS at 17:07

## 2024-01-01 RX ADMIN — POTASSIUM CHLORIDE 40 MEQ: 1500 TABLET, EXTENDED RELEASE ORAL at 14:27

## 2024-01-01 RX ADMIN — Medication 250 MG: at 09:40

## 2024-01-01 RX ADMIN — METOLAZONE 5 MG: 5 TABLET ORAL at 06:57

## 2024-01-01 RX ADMIN — HYDROXYZINE HYDROCHLORIDE 10 MG: 10 TABLET ORAL at 00:12

## 2024-01-01 RX ADMIN — MICONAZOLE NITRATE: 20 POWDER TOPICAL at 08:59

## 2024-01-01 RX ADMIN — MICONAZOLE NITRATE: 20 POWDER TOPICAL at 09:09

## 2024-01-01 RX ADMIN — Medication 250 MG: at 08:57

## 2024-01-01 RX ADMIN — ESCITALOPRAM 10 MG: 10 TABLET, FILM COATED ORAL at 08:57

## 2024-01-01 RX ADMIN — Medication 5 MG: at 20:47

## 2024-01-01 RX ADMIN — PANTOPRAZOLE SODIUM 40 MG: 40 TABLET, DELAYED RELEASE ORAL at 06:40

## 2024-01-01 RX ADMIN — MICONAZOLE NITRATE: 20 POWDER TOPICAL at 20:08

## 2024-01-01 RX ADMIN — MICONAZOLE NITRATE: 20 POWDER TOPICAL at 09:07

## 2024-01-01 RX ADMIN — MICONAZOLE NITRATE: 20 POWDER TOPICAL at 20:22

## 2024-01-01 RX ADMIN — PANTOPRAZOLE SODIUM 40 MG: 40 TABLET, DELAYED RELEASE ORAL at 06:34

## 2024-01-01 RX ADMIN — MICONAZOLE NITRATE: 20 POWDER TOPICAL at 20:29

## 2024-01-01 RX ADMIN — Medication 250 MG: at 09:11

## 2024-01-01 RX ADMIN — Medication 250 MG: at 21:40

## 2024-01-01 RX ADMIN — BUMETANIDE 1.5 MG: 0.25 INJECTION INTRAMUSCULAR; INTRAVENOUS at 13:00

## 2024-01-01 RX ADMIN — BUMETANIDE 1.5 MG: 0.25 INJECTION INTRAMUSCULAR; INTRAVENOUS at 00:14

## 2024-01-01 RX ADMIN — ASPIRIN 81 MG: 81 TABLET, CHEWABLE ORAL at 09:06

## 2024-01-01 RX ADMIN — HYDRALAZINE HYDROCHLORIDE 10 MG: 20 INJECTION INTRAMUSCULAR; INTRAVENOUS at 07:36

## 2024-01-01 RX ADMIN — ESCITALOPRAM 10 MG: 10 TABLET, FILM COATED ORAL at 09:19

## 2024-01-01 RX ADMIN — BUMETANIDE 2 MG: 0.25 INJECTION INTRAMUSCULAR; INTRAVENOUS at 12:28

## 2024-01-01 RX ADMIN — Medication 250 MG: at 08:34

## 2024-01-01 RX ADMIN — MICONAZOLE NITRATE: 20 POWDER TOPICAL at 21:02

## 2024-01-01 RX ADMIN — Medication 5 MG: at 20:49

## 2024-01-01 RX ADMIN — MIRTAZAPINE 15 MG: 15 TABLET, FILM COATED ORAL at 20:42

## 2024-01-01 RX ADMIN — MIRTAZAPINE 15 MG: 15 TABLET, FILM COATED ORAL at 20:59

## 2024-01-01 RX ADMIN — POTASSIUM CHLORIDE 40 MEQ: 1500 TABLET, EXTENDED RELEASE ORAL at 11:48

## 2024-01-01 RX ADMIN — WARFARIN SODIUM 1 MG: 1 TABLET ORAL at 18:17

## 2024-01-01 RX ADMIN — BUMETANIDE 2 MG: 1 TABLET ORAL at 09:06

## 2024-01-01 RX ADMIN — Medication 5 MG: at 22:01

## 2024-01-01 RX ADMIN — Medication 5 MG: at 20:40

## 2024-01-01 RX ADMIN — ASPIRIN 81 MG: 81 TABLET, CHEWABLE ORAL at 09:10

## 2024-01-01 RX ADMIN — MIRTAZAPINE 15 MG: 15 TABLET, FILM COATED ORAL at 21:02

## 2024-01-01 RX ADMIN — PANTOPRAZOLE SODIUM 40 MG: 40 TABLET, DELAYED RELEASE ORAL at 07:09

## 2024-01-01 RX ADMIN — MICONAZOLE NITRATE: 20 POWDER TOPICAL at 08:31

## 2024-01-01 RX ADMIN — LOPERAMIDE HYDROCHLORIDE 2 MG: 2 CAPSULE ORAL at 09:09

## 2024-01-01 RX ADMIN — LOPERAMIDE HYDROCHLORIDE 2 MG: 2 CAPSULE ORAL at 13:46

## 2024-01-01 RX ADMIN — Medication 5 MG: at 21:02

## 2024-01-01 RX ADMIN — MIRTAZAPINE 15 MG: 15 TABLET, FILM COATED ORAL at 21:40

## 2024-01-01 RX ADMIN — HYDROXYZINE HYDROCHLORIDE 25 MG: 25 TABLET ORAL at 00:57

## 2024-01-01 RX ADMIN — MULTIVIT AND MINERALS-FERROUS GLUCONATE 9 MG IRON/15 ML ORAL LIQUID 15 ML: at 09:08

## 2024-01-01 RX ADMIN — ANORECTAL OINTMENT: 15.7; .44; 24; 20.6 OINTMENT TOPICAL at 09:08

## 2024-01-01 RX ADMIN — POTASSIUM CHLORIDE 40 MEQ: 750 TABLET, EXTENDED RELEASE ORAL at 21:27

## 2024-01-01 RX ADMIN — Medication 5 MG: at 21:40

## 2024-01-01 RX ADMIN — Medication 1 TABLET: at 08:29

## 2024-01-01 RX ADMIN — HYDROXYZINE HYDROCHLORIDE 25 MG: 25 TABLET ORAL at 22:11

## 2024-01-01 RX ADMIN — ACETAZOLAMIDE 250 MG: 500 INJECTION, POWDER, LYOPHILIZED, FOR SOLUTION INTRAVENOUS at 15:42

## 2024-01-01 RX ADMIN — ALBUTEROL SULFATE 2.5 MG: 2.5 SOLUTION RESPIRATORY (INHALATION) at 08:39

## 2024-01-01 RX ADMIN — ACETAMINOPHEN 650 MG: 325 TABLET, FILM COATED ORAL at 03:43

## 2024-01-01 RX ADMIN — MICONAZOLE NITRATE: 20 POWDER TOPICAL at 20:49

## 2024-01-01 RX ADMIN — Medication 250 MG: at 09:00

## 2024-01-01 RX ADMIN — BUMETANIDE 1 MG: 0.25 INJECTION INTRAMUSCULAR; INTRAVENOUS at 00:31

## 2024-01-01 RX ADMIN — MICONAZOLE NITRATE: 20 POWDER TOPICAL at 21:28

## 2024-01-01 RX ADMIN — POTASSIUM CHLORIDE 20 MEQ: 1500 TABLET, EXTENDED RELEASE ORAL at 20:49

## 2024-01-01 RX ADMIN — POTASSIUM CHLORIDE 20 MEQ: 1500 TABLET, EXTENDED RELEASE ORAL at 09:19

## 2024-01-01 RX ADMIN — ASPIRIN 81 MG: 81 TABLET, CHEWABLE ORAL at 09:08

## 2024-01-01 RX ADMIN — ALBUTEROL SULFATE 2.5 MG: 2.5 SOLUTION RESPIRATORY (INHALATION) at 16:32

## 2024-01-01 RX ADMIN — POTASSIUM CHLORIDE 30 MEQ: 750 TABLET, EXTENDED RELEASE ORAL at 09:06

## 2024-01-01 RX ADMIN — MIRTAZAPINE 15 MG: 15 TABLET, FILM COATED ORAL at 20:28

## 2024-01-01 RX ADMIN — POTASSIUM CHLORIDE 20 MEQ: 1500 TABLET, EXTENDED RELEASE ORAL at 20:59

## 2024-01-01 RX ADMIN — POTASSIUM CHLORIDE 20 MEQ: 1500 TABLET, EXTENDED RELEASE ORAL at 08:57

## 2024-01-01 RX ADMIN — BUMETANIDE 1.5 MG: 0.25 INJECTION INTRAMUSCULAR; INTRAVENOUS at 12:29

## 2024-01-01 RX ADMIN — Medication 250 MG: at 20:49

## 2024-01-01 RX ADMIN — ESCITALOPRAM 10 MG: 10 TABLET, FILM COATED ORAL at 08:37

## 2024-01-01 RX ADMIN — WARFARIN SODIUM 2 MG: 2 TABLET ORAL at 18:41

## 2024-01-01 RX ADMIN — PANTOPRAZOLE SODIUM 40 MG: 40 TABLET, DELAYED RELEASE ORAL at 06:27

## 2024-01-01 RX ADMIN — Medication 250 MG: at 08:58

## 2024-01-01 RX ADMIN — POTASSIUM CHLORIDE 20 MEQ: 1500 TABLET, EXTENDED RELEASE ORAL at 08:59

## 2024-01-01 RX ADMIN — ACETAMINOPHEN 650 MG: 325 TABLET, FILM COATED ORAL at 20:57

## 2024-01-01 RX ADMIN — POLYETHYLENE GLYCOL 3350 17 G: 17 POWDER, FOR SOLUTION ORAL at 06:57

## 2024-01-01 RX ADMIN — ESCITALOPRAM 10 MG: 10 TABLET, FILM COATED ORAL at 09:07

## 2024-01-01 RX ADMIN — MIRTAZAPINE 15 MG: 15 TABLET, FILM COATED ORAL at 20:48

## 2024-01-01 RX ADMIN — BARIUM SULFATE 60 ML: 400 SUSPENSION ORAL at 11:57

## 2024-01-01 RX ADMIN — Medication 250 MG: at 20:59

## 2024-01-01 RX ADMIN — BUMETANIDE 1.5 MG: 0.25 INJECTION INTRAMUSCULAR; INTRAVENOUS at 09:19

## 2024-01-01 RX ADMIN — MORPHINE SULFATE 5 MG: 10 SOLUTION ORAL at 16:25

## 2024-01-01 RX ADMIN — PANTOPRAZOLE SODIUM 40 MG: 40 TABLET, DELAYED RELEASE ORAL at 06:33

## 2024-01-01 RX ADMIN — Medication 250 MG: at 09:19

## 2024-01-01 RX ADMIN — Medication 40 MG: at 05:53

## 2024-01-01 RX ADMIN — ESCITALOPRAM 10 MG: 10 TABLET, FILM COATED ORAL at 08:58

## 2024-01-01 RX ADMIN — ACETAMINOPHEN 650 MG: 325 TABLET, FILM COATED ORAL at 01:48

## 2024-01-01 RX ADMIN — Medication 1 TABLET: at 08:43

## 2024-01-01 RX ADMIN — METOLAZONE 5 MG: 5 TABLET ORAL at 06:32

## 2024-01-01 RX ADMIN — WHITE PETROLATUM: 1.75 OINTMENT TOPICAL at 09:08

## 2024-01-01 RX ADMIN — POTASSIUM CHLORIDE 20 MEQ: 1.5 SOLUTION ORAL at 09:08

## 2024-01-01 RX ADMIN — ACETAMINOPHEN 650 MG: 325 TABLET, FILM COATED ORAL at 06:34

## 2024-01-01 RX ADMIN — Medication 250 MG: at 20:05

## 2024-01-01 RX ADMIN — MIRTAZAPINE 15 MG: 15 TABLET, FILM COATED ORAL at 20:17

## 2024-01-01 RX ADMIN — Medication 5 MG: at 20:05

## 2024-01-01 RX ADMIN — Medication 5 MG: at 20:59

## 2024-01-01 RX ADMIN — MICONAZOLE NITRATE: 20 POWDER TOPICAL at 20:52

## 2024-01-01 RX ADMIN — MIRTAZAPINE 15 MG: 15 TABLET, FILM COATED ORAL at 21:28

## 2024-01-01 RX ADMIN — MICONAZOLE NITRATE: 20 POWDER TOPICAL at 20:50

## 2024-01-01 RX ADMIN — PANTOPRAZOLE SODIUM 40 MG: 40 TABLET, DELAYED RELEASE ORAL at 06:32

## 2024-01-01 RX ADMIN — BUMETANIDE 2 MG: 0.25 INJECTION INTRAMUSCULAR; INTRAVENOUS at 12:27

## 2024-01-01 RX ADMIN — Medication 250 MG: at 20:17

## 2024-01-01 RX ADMIN — LORAZEPAM 0.5 MG: 2 INJECTION INTRAMUSCULAR; INTRAVENOUS at 06:22

## 2024-01-01 RX ADMIN — ESCITALOPRAM 10 MG: 10 TABLET, FILM COATED ORAL at 09:10

## 2024-01-01 RX ADMIN — Medication 250 MG: at 20:48

## 2024-01-01 RX ADMIN — BUMETANIDE 2 MG: 0.25 INJECTION INTRAMUSCULAR; INTRAVENOUS at 13:00

## 2024-01-01 RX ADMIN — ALBUTEROL SULFATE 2.5 MG: 2.5 SOLUTION RESPIRATORY (INHALATION) at 19:59

## 2024-01-01 RX ADMIN — Medication 250 MG: at 09:07

## 2024-01-01 RX ADMIN — ALBUTEROL SULFATE 2.5 MG: 2.5 SOLUTION RESPIRATORY (INHALATION) at 07:29

## 2024-01-01 RX ADMIN — BUMETANIDE 1.5 MG: 0.25 INJECTION INTRAMUSCULAR; INTRAVENOUS at 20:49

## 2024-01-01 RX ADMIN — WARFARIN SODIUM 2 MG: 2 TABLET ORAL at 17:08

## 2024-01-01 RX ADMIN — ALBUTEROL SULFATE 2.5 MG: 2.5 SOLUTION RESPIRATORY (INHALATION) at 00:20

## 2024-01-01 RX ADMIN — WARFARIN SODIUM 3 MG: 3 TABLET ORAL at 18:17

## 2024-01-01 RX ADMIN — ASPIRIN 81 MG: 81 TABLET, CHEWABLE ORAL at 08:57

## 2024-01-01 RX ADMIN — ACETAMINOPHEN 650 MG: 325 TABLET, FILM COATED ORAL at 01:17

## 2024-01-01 RX ADMIN — Medication 250 MG: at 08:43

## 2024-01-01 RX ADMIN — ONDANSETRON 4 MG: 2 INJECTION INTRAMUSCULAR; INTRAVENOUS at 18:26

## 2024-01-01 RX ADMIN — WARFARIN SODIUM 2.5 MG: 2.5 TABLET ORAL at 18:31

## 2024-01-01 RX ADMIN — Medication 5 MG: at 21:08

## 2024-01-01 RX ADMIN — MIRTAZAPINE 15 MG: 15 TABLET, FILM COATED ORAL at 20:05

## 2024-01-01 RX ADMIN — MICONAZOLE NITRATE: 20 POWDER TOPICAL at 08:37

## 2024-01-01 RX ADMIN — Medication 5 MG: at 21:28

## 2024-01-01 RX ADMIN — WARFARIN SODIUM 2 MG: 2 TABLET ORAL at 17:30

## 2024-01-01 RX ADMIN — POTASSIUM CHLORIDE 30 MEQ: 750 TABLET, EXTENDED RELEASE ORAL at 21:41

## 2024-01-01 RX ADMIN — SODIUM BICARBONATE 325 MG: 650 TABLET ORAL at 13:19

## 2024-01-01 RX ADMIN — ASPIRIN 81 MG: 81 TABLET, CHEWABLE ORAL at 09:00

## 2024-01-01 RX ADMIN — POTASSIUM CHLORIDE 20 MEQ: 1500 TABLET, EXTENDED RELEASE ORAL at 20:39

## 2024-01-01 RX ADMIN — Medication 1 TABLET: at 09:07

## 2024-01-01 RX ADMIN — Medication 250 MG: at 21:28

## 2024-01-01 RX ADMIN — MICONAZOLE NITRATE: 20 POWDER TOPICAL at 09:13

## 2024-01-01 RX ADMIN — ASPIRIN 81 MG: 81 TABLET, CHEWABLE ORAL at 08:58

## 2024-01-01 RX ADMIN — BUMETANIDE 1.5 MG: 0.25 INJECTION INTRAMUSCULAR; INTRAVENOUS at 20:16

## 2024-01-01 RX ADMIN — ACETAMINOPHEN 650 MG: 325 TABLET, FILM COATED ORAL at 22:01

## 2024-01-01 RX ADMIN — POTASSIUM CHLORIDE 40 MEQ: 1500 TABLET, EXTENDED RELEASE ORAL at 10:21

## 2024-01-01 RX ADMIN — POTASSIUM CHLORIDE 20 MEQ: 1500 TABLET, EXTENDED RELEASE ORAL at 20:05

## 2024-01-01 RX ADMIN — POTASSIUM CHLORIDE 20 MEQ: 1500 TABLET, EXTENDED RELEASE ORAL at 09:09

## 2024-01-01 RX ADMIN — ACETAMINOPHEN 650 MG: 325 TABLET, FILM COATED ORAL at 21:46

## 2024-01-01 RX ADMIN — Medication 250 MG: at 09:09

## 2024-01-01 RX ADMIN — WARFARIN SODIUM 1.5 MG: 3 TABLET ORAL at 18:20

## 2024-01-01 RX ADMIN — PANCRELIPASE LIPASE, PANCRELIPASE PROTEASE, PANCRELIPASE AMYLASE 1 CAPSULE: 5000; 17000; 24000 CAPSULE, DELAYED RELEASE ORAL at 09:57

## 2024-01-01 RX ADMIN — HYDROXYZINE HYDROCHLORIDE 25 MG: 25 TABLET ORAL at 18:39

## 2024-01-01 RX ADMIN — MICONAZOLE NITRATE: 20 POWDER TOPICAL at 08:38

## 2024-01-01 RX ADMIN — MICONAZOLE NITRATE: 20 POWDER TOPICAL at 09:21

## 2024-01-01 RX ADMIN — BUMETANIDE 2 MG: 0.25 INJECTION INTRAMUSCULAR; INTRAVENOUS at 23:46

## 2024-01-01 RX ADMIN — POTASSIUM CHLORIDE 20 MEQ: 1500 TABLET, EXTENDED RELEASE ORAL at 21:08

## 2024-01-01 RX ADMIN — Medication 250 MG: at 20:40

## 2024-01-01 RX ADMIN — ESCITALOPRAM 10 MG: 10 TABLET, FILM COATED ORAL at 08:59

## 2024-01-01 RX ADMIN — BUMETANIDE 1.5 MG: 0.25 INJECTION INTRAMUSCULAR; INTRAVENOUS at 20:59

## 2024-01-01 RX ADMIN — BUMETANIDE 1.5 MG: 0.25 INJECTION INTRAMUSCULAR; INTRAVENOUS at 23:46

## 2024-01-01 RX ADMIN — HYDROXYZINE HYDROCHLORIDE 25 MG: 25 TABLET ORAL at 00:25

## 2024-01-01 RX ADMIN — POTASSIUM CHLORIDE 20 MEQ: 1500 TABLET, EXTENDED RELEASE ORAL at 20:42

## 2024-01-01 RX ADMIN — MICONAZOLE NITRATE: 20 POWDER TOPICAL at 22:02

## 2024-01-01 RX ADMIN — POTASSIUM CHLORIDE 30 MEQ: 750 TABLET, EXTENDED RELEASE ORAL at 21:02

## 2024-01-01 RX ADMIN — ALBUTEROL SULFATE 2.5 MG: 2.5 SOLUTION RESPIRATORY (INHALATION) at 10:06

## 2024-01-01 RX ADMIN — MORPHINE SULFATE 5 MG: 10 SOLUTION ORAL at 13:13

## 2024-01-01 RX ADMIN — BARIUM SULFATE 20 ML: 400 SUSPENSION ORAL at 11:56

## 2024-01-01 RX ADMIN — Medication 1 TABLET: at 08:58

## 2024-01-01 RX ADMIN — ESCITALOPRAM 10 MG: 10 TABLET, FILM COATED ORAL at 09:09

## 2024-01-01 RX ADMIN — POTASSIUM CHLORIDE 30 MEQ: 750 TABLET, EXTENDED RELEASE ORAL at 08:37

## 2024-01-01 RX ADMIN — POTASSIUM CHLORIDE 20 MEQ: 1500 TABLET, EXTENDED RELEASE ORAL at 20:17

## 2024-01-01 RX ADMIN — MICONAZOLE NITRATE: 20 POWDER TOPICAL at 09:41

## 2024-01-01 ASSESSMENT — ACTIVITIES OF DAILY LIVING (ADL)
ADLS_ACUITY_SCORE: 45
ADLS_ACUITY_SCORE: 47
ADLS_ACUITY_SCORE: 41
ADLS_ACUITY_SCORE: 42
ADLS_ACUITY_SCORE: 47
ADLS_ACUITY_SCORE: 49
ADLS_ACUITY_SCORE: 56
ADLS_ACUITY_SCORE: 55
ADLS_ACUITY_SCORE: 47
ADLS_ACUITY_SCORE: 47
ADLS_ACUITY_SCORE: 54
ADLS_ACUITY_SCORE: 49
ADLS_ACUITY_SCORE: 49
ADLS_ACUITY_SCORE: 48
ADLS_ACUITY_SCORE: 50
ADLS_ACUITY_SCORE: 54
ADLS_ACUITY_SCORE: 47
ADLS_ACUITY_SCORE: 56
ADLS_ACUITY_SCORE: 49
ADLS_ACUITY_SCORE: 49
ADLS_ACUITY_SCORE: 54
ADLS_ACUITY_SCORE: 49
ADLS_ACUITY_SCORE: 41
ADLS_ACUITY_SCORE: 50
ADLS_ACUITY_SCORE: 41
ADLS_ACUITY_SCORE: 49
ADLS_ACUITY_SCORE: 45
ADLS_ACUITY_SCORE: 53
ADLS_ACUITY_SCORE: 49
ADLS_ACUITY_SCORE: 49
ADLS_ACUITY_SCORE: 41
ADLS_ACUITY_SCORE: 50
ADLS_ACUITY_SCORE: 49
ADLS_ACUITY_SCORE: 45
ADLS_ACUITY_SCORE: 41
ADLS_ACUITY_SCORE: 54
ADLS_ACUITY_SCORE: 41
ADLS_ACUITY_SCORE: 54
ADLS_ACUITY_SCORE: 47
ADLS_ACUITY_SCORE: 47
ADLS_ACUITY_SCORE: 54
ADLS_ACUITY_SCORE: 48
ADLS_ACUITY_SCORE: 47
ADLS_ACUITY_SCORE: 41
ADLS_ACUITY_SCORE: 41
ADLS_ACUITY_SCORE: 50
ADLS_ACUITY_SCORE: 49
ADLS_ACUITY_SCORE: 41
ADLS_ACUITY_SCORE: 47
ADLS_ACUITY_SCORE: 48
ADLS_ACUITY_SCORE: 41
ADLS_ACUITY_SCORE: 45
ADLS_ACUITY_SCORE: 45
ADLS_ACUITY_SCORE: 41
ADLS_ACUITY_SCORE: 49
ADLS_ACUITY_SCORE: 55
ADLS_ACUITY_SCORE: 49
ADLS_ACUITY_SCORE: 47
ADLS_ACUITY_SCORE: 53
ADLS_ACUITY_SCORE: 51
ADLS_ACUITY_SCORE: 54
ADLS_ACUITY_SCORE: 49
ADLS_ACUITY_SCORE: 41
ADLS_ACUITY_SCORE: 49
ADLS_ACUITY_SCORE: 49
ADLS_ACUITY_SCORE: 46
ADLS_ACUITY_SCORE: 47
ADLS_ACUITY_SCORE: 49
ADLS_ACUITY_SCORE: 49
ADLS_ACUITY_SCORE: 51
ADLS_ACUITY_SCORE: 49
ADLS_ACUITY_SCORE: 47
ADLS_ACUITY_SCORE: 54
ADLS_ACUITY_SCORE: 54
ADLS_ACUITY_SCORE: 45
ADLS_ACUITY_SCORE: 49
ADLS_ACUITY_SCORE: 49
ADLS_ACUITY_SCORE: 54
ADLS_ACUITY_SCORE: 51
ADLS_ACUITY_SCORE: 49
ADLS_ACUITY_SCORE: 47
ADLS_ACUITY_SCORE: 43
ADLS_ACUITY_SCORE: 49
ADLS_ACUITY_SCORE: 41
ADLS_ACUITY_SCORE: 49
ADLS_ACUITY_SCORE: 41
ADLS_ACUITY_SCORE: 54
ADLS_ACUITY_SCORE: 53
ADLS_ACUITY_SCORE: 47
ADLS_ACUITY_SCORE: 41
ADLS_ACUITY_SCORE: 49
ADLS_ACUITY_SCORE: 49
ADLS_ACUITY_SCORE: 42
ADLS_ACUITY_SCORE: 45
ADLS_ACUITY_SCORE: 47
ADLS_ACUITY_SCORE: 49
ADLS_ACUITY_SCORE: 52
ADLS_ACUITY_SCORE: 56
ADLS_ACUITY_SCORE: 51
ADLS_ACUITY_SCORE: 50
ADLS_ACUITY_SCORE: 56
ADLS_ACUITY_SCORE: 47
ADLS_ACUITY_SCORE: 51
ADLS_ACUITY_SCORE: 49
ADLS_ACUITY_SCORE: 47
ADLS_ACUITY_SCORE: 49
ADLS_ACUITY_SCORE: 47
ADLS_ACUITY_SCORE: 47
ADLS_ACUITY_SCORE: 45
ADLS_ACUITY_SCORE: 45
ADLS_ACUITY_SCORE: 41
ADLS_ACUITY_SCORE: 53
ADLS_ACUITY_SCORE: 45
ADLS_ACUITY_SCORE: 55
ADLS_ACUITY_SCORE: 57
ADLS_ACUITY_SCORE: 47
ADLS_ACUITY_SCORE: 51
ADLS_ACUITY_SCORE: 45
ADLS_ACUITY_SCORE: 41
ADLS_ACUITY_SCORE: 53
ADLS_ACUITY_SCORE: 49
ADLS_ACUITY_SCORE: 41
ADLS_ACUITY_SCORE: 41
ADLS_ACUITY_SCORE: 45
ADLS_ACUITY_SCORE: 55
ADLS_ACUITY_SCORE: 46
ADLS_ACUITY_SCORE: 52
ADLS_ACUITY_SCORE: 54
ADLS_ACUITY_SCORE: 49
ADLS_ACUITY_SCORE: 56
ADLS_ACUITY_SCORE: 49
ADLS_ACUITY_SCORE: 50
ADLS_ACUITY_SCORE: 49
ADLS_ACUITY_SCORE: 53
ADLS_ACUITY_SCORE: 45
ADLS_ACUITY_SCORE: 52
ADLS_ACUITY_SCORE: 50
ADLS_ACUITY_SCORE: 45
ADLS_ACUITY_SCORE: 41
ADLS_ACUITY_SCORE: 49
ADLS_ACUITY_SCORE: 41
ADLS_ACUITY_SCORE: 54
ADLS_ACUITY_SCORE: 47
ADLS_ACUITY_SCORE: 52
ADLS_ACUITY_SCORE: 49
ADLS_ACUITY_SCORE: 41
ADLS_ACUITY_SCORE: 45
ADLS_ACUITY_SCORE: 50
ADLS_ACUITY_SCORE: 54
ADLS_ACUITY_SCORE: 49
ADLS_ACUITY_SCORE: 41
ADLS_ACUITY_SCORE: 45
ADLS_ACUITY_SCORE: 55
ADLS_ACUITY_SCORE: 47
ADLS_ACUITY_SCORE: 51
ADLS_ACUITY_SCORE: 50
ADLS_ACUITY_SCORE: 47
ADLS_ACUITY_SCORE: 47
ADLS_ACUITY_SCORE: 41

## 2024-01-01 NOTE — PLAN OF CARE
Problem: Enteral Nutrition  Goal: Absence of Aspiration Signs and Symptoms  Intervention: Minimize Aspiration Risk  Recent Flowsheet Documentation  Taken 1/1/2024 0033 by Val Nazario RN  Enteral Feeding Safety: tubing labeled as enteral feeding  Goal: Safe, Effective Therapy Delivery  Intervention: Prevent Feeding-Related Adverse Events  Recent Flowsheet Documentation  Taken 1/1/2024 0033 by Val Nazario RN  Enteral Feeding Safety: tubing labeled as enteral feeding     Problem: Adult Inpatient Plan of Care  Goal: Absence of Hospital-Acquired Illness or Injury  Intervention: Identify and Manage Fall Risk  Recent Flowsheet Documentation  Taken 1/1/2024 0033 by Val Nazario RN  Safety Promotion/Fall Prevention: activity supervised  Intervention: Prevent Skin Injury  Recent Flowsheet Documentation  Taken 1/1/2024 0033 by Val Nazario RN  Body Position: position changed independently  Skin Protection: incontinence pads utilized  Device Skin Pressure Protection: absorbent pad utilized/changed   Goal Outcome Evaluation:    Patient alert and oriented and can make needs known by writing. Lungs clear, on vent overnight. Patient states she was somewhat anxious and received PRN hydroxyzine with good relief. Uses the bedside commode with SBA to get there. One medium loose BM overnight. Upper right arm PICC patent. Morning labs sent. Right foot is dressed with gauze and kerlix. Refuses to have brief checked or to get OOB for morning weight. Will wait until she gets out of bed to sit on commode to obtain weight. Continue plan of care.

## 2024-01-01 NOTE — PROGRESS NOTES
Patient c/o yellowish secretion from the vagina. Just Dr. Ndiaye  was called per her request to see the secretion, she did vagina exam and thought it's likely to be a fistula. MD explained the situation to patient.

## 2024-01-01 NOTE — PLAN OF CARE
Problem: Adult Inpatient Plan of Care  Goal: Optimal Comfort and Wellbeing  Outcome: Progressing  Intervention: Provide Person-Centered Care  Recent Flowsheet Documentation  Taken 1/1/2024 1135 by Shelly Qureshi RN  Trust Relationship/Rapport:   care explained   choices provided     Problem: Adult Inpatient Plan of Care  Goal: Optimal Comfort and Wellbeing  Intervention: Provide Person-Centered Care  Recent Flowsheet Documentation  Taken 1/1/2024 1135 by Shelly Qureshi RN  Trust Relationship/Rapport:   care explained   choices provided   Goal Outcome Evaluation:    Patient feeding tube ([place] tube) was clogged, at the onset of the shift. All efforts to by writer to unclog feeding tube was unsuccessful. Feeding tube unclogged by swat nurse at 14:50 pm. Patient requested to have her feeding tube remove. Encouraged  pt to wait until her video swallow study test is perform tomorrow 1/2/24.

## 2024-01-01 NOTE — PLAN OF CARE
"  Problem: Mechanical Ventilation Invasive  Goal: Effective Communication  Outcome: Progressing  Goal: Optimal Device Function  Outcome: Progressing  Goal: Mechanical Ventilation Liberation  Outcome: Progressing  Goal: Absence of Device-Related Skin and Tissue Injury  Outcome: Progressing  Goal: Absence of Ventilator-Induced Lung Injury  Outcome: Progressing   Goal Outcome Evaluation:             RT PROGRESS NOTE     DATA:     CURRENT SETTINGS:             TRACH TYPE / SIZE:  #6 bivona changed 12/12             MODE:   ac 14, 400, peep 5, 30% at night                ACTION:             THERAPIES:   albuterol bid             SUCTION:                           FREQUENCY:   X1                        AMOUNT:   scant                        CONSISTENCY:   thin                        COLOR:   white             SPONTANEOUS COUGH EFFORT/STRENGTH OF EFFORT (not elicited by suctioning): moderate to strong                              WEANING PHASE:   2                        WEAN MODE:    30% and 40L tm with pmv                        WEAN TIME:   started at 07:25                          RESPONSE:             BS:   clear and diminished, diminished and coarse in bases             VITAL SIGNS:   Blood pressure 116/58, pulse 60, temperature 98.2  F (36.8  C), temperature source Oral, resp. rate 28, height 1.613 m (5' 3.5\"), weight 74.5 kg (164 lb 3.2 oz), SpO2 100%, not currently breastfeeding.               EMOTIONAL NEEDS / CONCERNS:  none                RISK FOR SELF DECANNULATION:  no                                              NOTE / PLAN:   Consider capping trials again.             "

## 2024-01-01 NOTE — PROGRESS NOTES
[facility]    Medicine Progress Note - Hospitalist Service    Date of Admission:  12/15/2023    Brief summary:  Priya Funez is a 83 year old female with a PMHx of HFpEF, severe TR, permanent AF s/p AVN ablation with PPM implant 1/11/19 s/p extraction TV (transvenous) PPM and implant leadless PPM 7/31/23, emergent aortic dissection repair 1/4/19, HTN, CKD, NEDA, and obesity who presented to [facility] as an outpatient for elective TVR done on 11/28/2023.  Immediate postoperative course was complicated by acute hypoxic and hypercapnic respiratory failure requiring BiPAP and probable pneumonia and treated with 7 days of IV Zosyn, patient was transferred to surgical telemetry floor on 12/2/2020.  On 12/6/2023 she was transferred back to CVICU after RRT for acute hypoxic respiratory distress and subsequent respiratory arrest with short CPR, re-intubation, and ROSC achieved. She sustained mildly displaced and nondisplaced right 3-5 rib fractures with associated right chest wall hematoma and nondisplaced anterolateral left 4th and 5th rib fractures. She was treated with meropenem 7 day course. She also has hx of recent C diff and multiple loose stools, for which she completed a 7 day po vancomycin course (per antimicrobial stewardship team, more likely to be colonization with recent history and no apparent treatment). Patient was transiently hyperglycemic and treated with insulin infusion then transitioned to sliding scale insulin per protocol. Blood sugars remained stable, has not required insulin coverage.  She remained intubated in the CVICU until undergoing tracheostomy by Dr. Pitts on 12/13/23, doing well with trach dome and PS currently.  She was treated for volume overload with diuretics (Bumex and chlorothiazide) with subsequent hypernatremia and hypotension requiring FWF and pressor support.  Spironolactone, torsemide and metoprolol discontinued and midodrine started with now stable blood pressures. Pre-op weight 70.9 kg,  discharge weight 74.1 kg (163 lbs) on 12/15/2023.  Patient was discharged to LTACH 12/15/2023 for continuing vent weaning, therapies      LTACH course:  12/17: Vitals stable.  Scheduled midodrine discontinued with BP stable in the 140s.  Diuretic therapy with torsemide restarted given peripheral edema and volume status, however at lower dose of 10 mg daily (home dose 40 mg daily) titrate slowly based on BP response.  Resume metoprolol for A-fib rate control as well gradually.  Labs reviewed, stable electrolytes, creatinine 0.82. ProBNP 3710, down from 87781 and 54046.  Hgb 8.0 from 7.6.  INR 0.98 from 1.12.  Continue heparin and Coumadin together for now until INR therapeutic.  Started hydroxyzine for anxiety 12/17.  A.m. labs ordered.       12/18-12/24:  increased torsemide to 20 mg po daily due to peripheral edema on 12/18, having loose stools,  changed tube feeds to higher fiber formula and add imodium daily and prn.  Still with edema,  increase toresemide to 40 mg po daily on 12/19 and increased imodium to bid and prn, changed heparin to lovenox for pt comfort.  She is getting prn hydroxyzine for anxiety which we should try to titrate off as anticoholinergics are not optimal in the geriatric population. 12/22 switched to bumex 1 mg daily and increased to bumex 1 mg BID 12/23.  Encouraged leg elevation above heart. Give hydrochlorothiazide one dose 12/24 - if that doesn't help then would need to try IV diuretics.  INR above 2 for the first time on 12/24 so if above 2 on 12/25 then could stop the lovenox.    12/25 - 12/29.  Had extensive lower extremity edema.  Switched Bumex to IV and then increased it from Bumex 1 mg twice daily to Bumex 1.5 mg twice daily.  Has been diuresing well.  Developed dermatitis on the lower extremities.  Started hydrocortisone cream.  12/27, VBG revealed hypercapnia she was put back on the mechanical ventilator with improvement.  12/29. Continues to diurese well. Plan to monitor kidney  function with creatinine.  She remains on vent weaning phase 2.  Making some progress.    12/30:  lower extremities are still swollen -increased bumex to 2 mg IV bid, more erythema, will monitor - may need to treat for cellulitis.    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  Increased urinary output, decreased swelling of LE.    1/1:  spoke with patient and she would like to be DNR.  She will go through with the swallow test tomorrow but has decided she no longer will have NJ tube in and will eat no matter what the test results are.  She agrees to continue with NJ tube tube until tomorrow.  She reports that her legs feel better.      Assessment & Plan   Acute respiratory failure and arrest on 12/6/23   Acute hypercapnic and hypoxic respiratory failure requiring BiPAP  s/p tracheostomy 12/13/23  s/p Mechanical ventilation  Aspiration pneumonitis vs pneumonia  Possible aspiration event 12/1  Near complete plugging of left lower lobe on CT   Acute mildly displaced and nondisplaced right 3-5 rib fractures with associated right chest wall hematoma and nondisplaced anterolateral left fourth and fifth rib fractures  - weaning per pulmonology  -RT for tracheostomy cares, oxygenation, nebs, pulmonary therapies  - Tracheostomy sutures removed (12/18/2023)  - Mucomyst, duoneb w/ RT   - SLP speaking valve as tolerated      S/p respiratory arrest 12/6  Hx of severe TR s/p TVR  HFpEF  Hx of Afib s/p AVN ablation - PPM, upgraded to leadless 7/2023  Hypertension  Hyperlipidemia  Hx of aortic dissection repair 2019  Respiratory arrest 12/6   - Stable paced rhythm with underlying atrial flutter. On review of cardiology, underlying aflutter/afib has been present since at least June of this year. Device RN adjusted PPM to PTA settings rate 60 at [facility].   - Pre-op 9/8/23 echo: LVEF 55/60%, mildly dilated RV, normal function  - Echo 12/4 with  LVEF 60-65%, normal RV function, TV mean gradient 5 mmHg, no TR  - Echo 12/7 with no significant change from previous with the exception of mild reduction of RV. RV free wall dyskinesia.   - ASA 81 mg daily  -was on scheduled midodrine 10 mg Q8H but stopped that 12/16/23 since BP in the 140s systolic and stable  -Continue midodrine 5 mg TID PRN for SBP<100   - Started Warfarin 12/15.    -Discontinued lovenox 12/26/2023. Continue with coumadin INR on goal.Pharmacy consulted for Coumadin dosing (INR goal 2-3)      Acute on chronic diastolic heart failure with Elevated BNP, improving   - Held PTA spironolactone, torsemide, metoprolol due to hypotension at prior hospital  - Echo 12/4 with LVEF 60-65%, normal RV function, TV mean gradient 5 mmHg, no TR  - Echo 12/7 with no significant change from previous with the exception of mild reduction of RV. RV free wall dyskinesia.   - FLUID STATUS: Pre-op weight 70.9 kg, discharge weight 74.1 kg (163 lbs) on 12/15/2023 and 165 lbs on 12/16  -increased torsemide to 40 mg po daily on 12/19 then switched to bumex 12/22 and increased to bumex 1 mg BID on 12/23- increased to 1.5, now increased bumex to 2 mg IV bid on 12/30  -proBNP trend appears improved from a peak of 17352 to now 3710.  -has a new blister on her right foot likely due to fluid retention      blister on top of Right foot  -  likely due to fluid retention  -blister popped on 12/30  -monitor for cellulitis    Multiple loose stools   Hx of C. Difficile (9/2023)  Severe malnutrition in the context of acute illness  - Nutrition consulted for continuing tube feedings- will change to higher fiber tube feeds - on 12/18  - NJT and enteral feeds at goal (45 mL/hr)   - SLP: NPO  - PPI famotidine  - Bowel regimen discontinued due to continued loose stools  - Finished vancomycin po 7 day course   - Continue Banatrol  - PRN Zofran  4 mg PO or IV Q6H  - PRN Compazine 5 mg IV Q6H   - loperamide 2 mg BID and prn     Hypernatremia    Hypokalemia  CKD stage 3  Lactic acidosis, resolved  IMAN on CKD, resolved   Edema  Baseline creat ~1.2-1.3   FLUID STATUS: Pre-op weight 70.9 kg, discharge weight 74.1 kg (163 lbs).   - Elevated sodium, Na 151 --> 147 on recheck, likely 2/2 aggressive diureses S/p bumex and chlorotiazide prior to transfer  -On free water flushes 30ml q4h  - I/O has been inaccurate due to unmeasured stools and voids.  - Replete lytes per protocol  - Avoid/limit nephrotoxins as able  - Strict I/O, daily weights, avoid/limit nephrotoxins     Stress induced hyperglycemia  T2DM  Hgb A1c 5.7%, stable off meds  - PTA Jardiance on hold, resume once medically optimized   - Goal BG <180 for optimal healing     Stress induced leukocytosis  Probable aspiration pneumonitis vs pneumonia, resolved  - 11/30 Zosyn 7-day course for pneumonia (finished)   - 12/6 Given further decompensation, Vanco and meropenem was started on 12/6   - Finished meropenem 7 day course (12/12)   - Finished Vancomycin PO 7 day course (for c-diff) (12/13)  - Blood culture with NGTD   - Urine with Candida and sputum with Yeast, likely both colonizations.   - Urine culture with NGTD     Hx of recent C. Difficile  - C. Difficile toxin PCR positive 12/3 with negative antigen and negative toxin - per antimicrobial stewardship team, more likely to be colonization with recent history and no apparent treatment  - Finished oral vancomycin course on 12/13, still with diarrhea but no abd pain, leucocytosis, or fevers so monitoring for now  - Monitor fever curve, WBC, and inflammatory markers as appropriate  - Enteric precautions per infection prevention protocol      3rd, 4th right rib fractures due to CPR  -Continue mini thoracotomy precautions  -Judicious use of narcotics, de-escalate as able     Acute blood loss anemia  - Hgb has been stable.   -Hgb 7.6 on 12/16 and 8.0 on 12/17/2023  -Iron studies: Serum iron 29, iron binding capacity 216, saturation index 13%.  -Follow CBC  "with weekly labs.   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   -f/up outpt     Diet: Adult Formula Drip Feeding: Continuous Jevity 1.5; Nasojejunal; Goal Rate: 50; mL/hr; TF to run x22 hours holding 1 hr before/after warfarin  NPO for Medical/Clinical Reasons Except for: Ice Chips, NPO but receiving Tube Feeding    DVT Prophylaxis: Enoxaparin (Lovenox) subcutaneous transitioning to warfarin   Barrientos Catheter: Not present  Lines: PRESENT      PICC 12/07/23 Triple Lumen Right Basilic ok to use PICC-Site Assessment: WDL      Cardiac Monitoring: None  Code Status: Full Code      Clinically Significant Risk Factors        # Hypokalemia: Lowest K = 3.2 mmol/L in last 2 days, will replace as needed       # Hypoalbuminemia: Lowest albumin = 2.7 g/dL at 12/18/2023  5:17 AM, will monitor as appropriate     # Hypertension: Noted on problem list    # Acute heart failure with preserved ejection fraction: heart failure noted on problem list, last echo with EF >50%, and receiving IV diuretics       # Overweight: Estimated body mass index is 28.89 kg/m  as calculated from the following:    Height as of this encounter: 1.613 m (5' 3.5\").    Weight as of this encounter: 75.2 kg (165 lb 11.2 oz).   # Severe Malnutrition: based on nutrition assessment      # Financial/Environmental Concerns: none   # Pacemaker present  # History of CABG: noted on surgical history       Disposition Plan     Expected Discharge Date: 01/03/2024      Destination: home with family;foster/protective services            Clarissa Ndiaye MD  Hospitalist Service  LTACH  Securely message with [employer] (more " info)  Text page via [facility] Paging/Directory   ______________________________________________________________________    Interval History   Reports that her breathing is better, her LE edema has improved since yesterday, spoke with patient and she would like to be DNR.  She will go through with the swallow test tomorrow but has decided she no longer will have NJ tube in and will eat no matter what the test results are.  She agrees to continue with NJ tube tube until tomorrow.  She reports that her legs feel better.      Physical Exam   Vital Signs: Temp: 97.5  F (36.4  C) Temp src: Oral BP: 127/61 Pulse: 71   Resp: 20 SpO2: 94 % O2 Device: Trach dome Oxygen Delivery: 40 LPM  Weight: 165 lbs 11.2 oz    GENERAL: mild respiratory distress  HEENT: Head is normocephalic atraumatic eyes pupils appear equal round and reactive to light.  NECK: Supple, Tracheostomy in place  NOSE: NJ tube in place  LUNGS: rhonchi bilaterally, decreased breath sounds at bases   HEART: S1 S2, Rate and rhythm is regular. No murmurs, 2+ BLE edema  ABDOMEN: Soft, nontender, no distension. Bowel sounds are positive. No guarding or rebound.  :  stool coming out of vagina    EXTREMITIES: 2-3+ bilateral lower extremity edema noted   SKIN: popped blister R foot, +erythema of b/l lower extremities   PSYCHIATRIC: Normal affect and cognition     Medical Decision Making       66 MINUTES SPENT BY ME on the date of service doing chart review, history, exam, documentation & further activities per the note.      Data   Lab Results   Component Value Date    WBC 6.1 12/28/2023    WBC 5.3 06/14/2021     Lab Results   Component Value Date    RBC 2.64 12/28/2023    RBC 4.33 06/14/2021     Lab Results   Component Value Date    HGB 7.8 12/28/2023    HGB 12.8 06/14/2021     Lab Results   Component Value Date    HCT 25.9 12/28/2023    HCT 40.1 06/14/2021     Lab Results   Component Value Date    MCV 98 12/28/2023    MCV 93 06/14/2021     Lab Results   Component Value  Date    MCH 29.5 12/28/2023    MCH 29.6 06/14/2021     Lab Results   Component Value Date    MCHC 30.1 12/28/2023    MCHC 31.9 06/14/2021     Lab Results   Component Value Date    RDW 17.9 12/28/2023    RDW 13.5 06/14/2021     Lab Results   Component Value Date     12/28/2023     06/14/2021       Last Comprehensive Metabolic Panel:  Sodium   Date Value Ref Range Status   01/01/2024 145 135 - 145 mmol/L Final     Comment:     Reference intervals for this test were updated on 09/26/2023 to more accurately reflect our healthy population. There may be differences in the flagging of prior results with similar values performed with this method. Interpretation of those prior results can be made in the context of the updated reference intervals.    06/23/2021 138 133 - 144 mmol/L Final     Potassium   Date Value Ref Range Status   01/01/2024 3.9 3.4 - 5.3 mmol/L Final   12/13/2022 3.8 3.4 - 5.3 mmol/L Final   06/23/2021 4.5 3.4 - 5.3 mmol/L Final     Potassium POCT   Date Value Ref Range Status   12/27/2023 5.3 (H) 3.5 - 5.0 mmol/L Final     Chloride   Date Value Ref Range Status   01/01/2024 100 98 - 107 mmol/L Final   12/13/2022 99 94 - 109 mmol/L Final   06/23/2021 104 94 - 109 mmol/L Final     Carbon Dioxide   Date Value Ref Range Status   06/23/2021 31 20 - 32 mmol/L Final     Carbon Dioxide (CO2)   Date Value Ref Range Status   01/01/2024 40 (H) 22 - 29 mmol/L Final   12/13/2022 33 (H) 20 - 32 mmol/L Final     Anion Gap   Date Value Ref Range Status   01/01/2024 5 (L) 7 - 15 mmol/L Final   12/13/2022 7 3 - 14 mmol/L Final   06/23/2021 3 3 - 14 mmol/L Final     Glucose   Date Value Ref Range Status   01/01/2024 108 (H) 70 - 99 mg/dL Final   12/13/2022 84 70 - 99 mg/dL Final   06/23/2021 86 70 - 99 mg/dL Final     GLUCOSE BY METER POCT   Date Value Ref Range Status   12/30/2023 101 (H) 70 - 99 mg/dL Final     Urea Nitrogen   Date Value Ref Range Status   01/01/2024 36.9 (H) 8.0 - 23.0 mg/dL Final    12/13/2022 31 (H) 7 - 30 mg/dL Final   06/23/2021 30 7 - 30 mg/dL Final     Creatinine   Date Value Ref Range Status   01/01/2024 0.95 0.51 - 0.95 mg/dL Final   06/23/2021 1.29 (H) 0.52 - 1.04 mg/dL Final     GFR Estimate   Date Value Ref Range Status   01/01/2024 60 (L) >60 mL/min/1.73m2 Final   06/23/2021 39 (L) >60 mL/min/[1.73_m2] Final     Comment:     Non  GFR Calc  Starting 12/18/2018, serum creatinine based estimated GFR (eGFR) will be   calculated using the Chronic Kidney Disease Epidemiology Collaboration   (CKD-EPI) equation.       Calcium   Date Value Ref Range Status   01/01/2024 8.9 8.8 - 10.2 mg/dL Final   06/23/2021 9.2 8.5 - 10.1 mg/dL Final     Bilirubin Total   Date Value Ref Range Status   01/01/2024 0.7 <=1.2 mg/dL Final   04/04/2019 1.0 0.2 - 1.3 mg/dL Final     Alkaline Phosphatase   Date Value Ref Range Status   01/01/2024 122 40 - 150 U/L Final     Comment:     Reference intervals for this test were updated on 11/14/2023 to more accurately reflect our healthy population. There may be differences in the flagging of prior results with similar values performed with this method. Interpretation of those prior results can be made in the context of the updated reference intervals.   04/04/2019 104 40 - 150 U/L Final     ALT   Date Value Ref Range Status   01/01/2024 8 0 - 50 U/L Final     Comment:     Reference intervals for this test were updated on 6/12/2023 to more accurately reflect our healthy population. There may be differences in the flagging of prior results with similar values performed with this method. Interpretation of those prior results can be made in the context of the updated reference intervals.     06/14/2021 22 0 - 50 U/L Final     AST   Date Value Ref Range Status   01/01/2024 28 0 - 45 U/L Final     Comment:     Reference intervals for this test were updated on 6/12/2023 to more accurately reflect our healthy population. There may be differences in the  flagging of prior results with similar values performed with this method. Interpretation of those prior results can be made in the context of the updated reference intervals.   04/04/2019 27 0 - 45 U/L Final

## 2024-01-01 NOTE — PLAN OF CARE
Problem: Mechanical Ventilation Invasive  Goal: Effective Communication  Outcome: Progressing  Goal: Optimal Device Function  Outcome: Progressing  Intervention: Optimize Device Care and Function  Recent Flowsheet Documentation  Taken 12/31/2023 1942 by Rosita Sifuentes RT  Airway Safety Measures:   all equipment/monitors on and audible   manual resuscitator/mask/valve at bedside   oxygen flowmeter   suction at bedside   suction equipment   suction regulator  Goal: Mechanical Ventilation Liberation  Outcome: Progressing  Goal: Absence of Device-Related Skin and Tissue Injury  Outcome: Progressing  Goal: Absence of Ventilator-Induced Lung Injury  Outcome: Progressing  RT PROGRESS NOTE     DATA:     CURRENT SETTINGS:  14/400/+5             TRACH TYPE / SIZE: #6 Bivona (Placed 12/22)             MODE:  AC             FIO2:  30%     ACTION:             THERAPIES: Alb BID             SUCTION: Yes                          FREQUENCY: 3x                        AMOUNT:  Scant x1 to Small x2                        CONSISTENCY: Thick                        COLOR:  Pale yellow             SPONTANEOUS COUGH EFFORT/STRENGTH OF EFFORT (not elicited by suctioning): good productive cough.                              WEANING PHASE:  2                        WEAN MODE:  30%, 40 L TM with PMV                        WEAN TIME:  14 hours and 18 minutes                        END WEAN REASON: Vent at noc     RESPONSE:             BS:  Clear/Diminished             VITAL SIGNS: Sat: % HR: 61-74  RR: 18-23             EMOTIONAL NEEDS / CONCERNS: None                 RISK FOR SELF DECANNULATION: None                        RISK DUE TO:                          INTERVENTION/S IN PLACE IS/ARE: n/A       NOTE / PLAN:      Pt remains on full vent and tolerating well.  Resume TM weaning this morning.

## 2024-01-02 NOTE — PLAN OF CARE
Problem: Adult Inpatient Plan of Care  Goal: Optimal Comfort and Wellbeing  Intervention: Provide Person-Centered Care  Recent Flowsheet Documentation  Taken 1/2/2024 0015 by Piyush Gaitan, RN  Trust Relationship/Rapport:   care explained   choices provided     Problem: Anxiety Signs/Symptoms  Goal: Improved Sleep (Anxiety Signs/Symptoms)  Outcome: Progressing   Goal Outcome Evaluation:    Pt did not exhibit symptoms of anxiety this shift, and slept comfortably most of the night.  Denies pain, and vitals were stable.

## 2024-01-02 NOTE — PLAN OF CARE
Problem: Artificial Airway  Goal: Effective Communication  Outcome: Progressing  Goal: Optimal Device Function  Outcome: Progressing  Intervention: Optimize Device Care and Function  Recent Flowsheet Documentation  Taken 12/24/2023 1322 by Allison Casarez RT  Airway Safety Measures: all equipment/monitors on and audible  Taken 12/24/2023 1203 by Allison Casarez RT  Airway Safety Measures: all equipment/monitors on and audible  Taken 12/24/2023 0720 by Allison Casarez RT  Airway Safety Measures: all equipment/monitors on and audible  Goal: Absence of Device-Related Skin or Tissue Injury  Outcome: Progressing   Goal Outcome Evaluation:       RT PROGRESS NOTE   [id number]  DATA:     CURRENT SETTINGS:             TRACH TYPE / SIZE: #6 Bivona downsized from #6 Shiley on 12/22/23             MODE: TM 40% 40L/ Cap 3L NC             FIO2:        ACTION:             THERAPIES: Albuterol neb BID changed to PRN             SUCTION:                           FREQUENCY:  none                        AMOUNT:                           CONSISTENCY:                           COLOR:                SPONTANEOUS COUGH EFFORT/STRENGTH OF EFFORT (not elicited by suctioning):                               WEANING PHASE:3, re-started today, on 1/2/24                          WEAN MODE:  TM  40% 40L for 4 hr,                                                   Cap 3L NC started at 1245                           WEAN TIME:                           END WEAN REASON: Plan - TM/PMV at noc, it will be the 1st noc off vent     RESPONSE:             BS:                VITAL SIGNS:                EMOTIONAL NEEDS / CONCERNS:                  RISK FOR SELF DECANNULATION:                          RISK DUE TO:                          INTERVENTION/S IN PLACE IS/ARE:         NOTE / PLAN:   Goal Outcome Evaluation:     Pt had VSS today.     Plan to use TM/PMV at [facility], Cap Days. Tonight will be the 1st noc off vent.     Cont to monitor and treat            Xelthoz Pregnancy And Lactation Text: This medication is Pregnancy Category D and is not considered safe during pregnancy.  The risk during breast feeding is also uncertain.

## 2024-01-02 NOTE — PROGRESS NOTES
[facility]    Medicine Progress Note - Hospitalist Service    Date of Admission:  12/15/2023    Brief summary:  Priya Funez is a 83 year old female with a PMHx of HFpEF, severe TR, permanent AF s/p AVN ablation with PPM implant 1/11/19 s/p extraction TV (transvenous) PPM and implant leadless PPM 7/31/23, emergent aortic dissection repair 1/4/19, HTN, CKD, NEDA, and obesity who presented to [facility] as an outpatient for elective TVR done on 11/28/2023.  Immediate postoperative course was complicated by acute hypoxic and hypercapnic respiratory failure requiring BiPAP and probable pneumonia and treated with 7 days of IV Zosyn, patient was transferred to surgical telemetry floor on 12/2/2020.  On 12/6/2023 she was transferred back to CVICU after RRT for acute hypoxic respiratory distress and subsequent respiratory arrest with short CPR, re-intubation, and ROSC achieved. She sustained mildly displaced and nondisplaced right 3-5 rib fractures with associated right chest wall hematoma and nondisplaced anterolateral left 4th and 5th rib fractures. She was treated with meropenem 7 day course. She also has hx of recent C diff and multiple loose stools, for which she completed a 7 day po vancomycin course (per antimicrobial stewardship team, more likely to be colonization with recent history and no apparent treatment). Patient was transiently hyperglycemic and treated with insulin infusion then transitioned to sliding scale insulin per protocol. Blood sugars remained stable, has not required insulin coverage.  She remained intubated in the CVICU until undergoing tracheostomy by Dr. Pitts on 12/13/23, doing well with trach dome and PS currently.  She was treated for volume overload with diuretics (Bumex and chlorothiazide) with subsequent hypernatremia and hypotension requiring FWF and pressor support.  Spironolactone, torsemide and metoprolol discontinued and midodrine started with now stable blood pressures. Pre-op weight 70.9 kg,  discharge weight 74.1 kg (163 lbs) on 12/15/2023.  Patient was discharged to LTACH 12/15/2023 for continuing vent weaning, therapies      LTACH course:  12/17: Vitals stable.  Scheduled midodrine discontinued with BP stable in the 140s.  Diuretic therapy with torsemide restarted given peripheral edema and volume status, however at lower dose of 10 mg daily (home dose 40 mg daily) titrate slowly based on BP response.  Resume metoprolol for A-fib rate control as well gradually.  Labs reviewed, stable electrolytes, creatinine 0.82. ProBNP 3710, down from 23436 and 50749.  Hgb 8.0 from 7.6.  INR 0.98 from 1.12.  Continue heparin and Coumadin together for now until INR therapeutic.  Started hydroxyzine for anxiety 12/17.  A.m. labs ordered.       12/18-12/24:  increased torsemide to 20 mg po daily due to peripheral edema on 12/18, having loose stools,  changed tube feeds to higher fiber formula and add imodium daily and prn.  Still with edema,  increase toresemide to 40 mg po daily on 12/19 and increased imodium to bid and prn, changed heparin to lovenox for pt comfort.  She is getting prn hydroxyzine for anxiety which we should try to titrate off as anticoholinergics are not optimal in the geriatric population. 12/22 switched to bumex 1 mg daily and increased to bumex 1 mg BID 12/23.  Encouraged leg elevation above heart. Give hydrochlorothiazide one dose 12/24 - if that doesn't help then would need to try IV diuretics.  INR above 2 for the first time on 12/24 so if above 2 on 12/25 then could stop the lovenox.    12/25 - 12/29.  Had extensive lower extremity edema.  Switched Bumex to IV and then increased it from Bumex 1 mg twice daily to Bumex 1.5 mg twice daily.  Has been diuresing well.  Developed dermatitis on the lower extremities.  Started hydrocortisone cream.  12/27, VBG revealed hypercapnia she was put back on the mechanical ventilator with improvement.  12/29. Continues to diurese well. Plan to monitor kidney  function with creatinine.  She remains on vent weaning phase 2.  Making some progress.    12/30:  lower extremities are still swollen -increased bumex to 2 mg IV bid, more erythema, will monitor - may need to treat for cellulitis.    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  Increased urinary output, decreased swelling of LE.    1/1:  spoke with patient and she would like to be DNR.  She will go through with the swallow test tomorrow but has decided she no longer will have NJ tube in and will eat no matter what the test results are.  She agrees to continue with NJ tube tube until tomorrow.  She reports that her legs feel better.    1/2:  NJ discontinued, passed swallow test, diet changed to bite sized food and thin liquids    Assessment & Plan   Acute respiratory failure and arrest on 12/6/23   Acute hypercapnic and hypoxic respiratory failure requiring BiPAP  s/p tracheostomy 12/13/23  s/p Mechanical ventilation  Aspiration pneumonitis vs pneumonia  Possible aspiration event 12/1  Near complete plugging of left lower lobe on CT   Acute mildly displaced and nondisplaced right 3-5 rib fractures with associated right chest wall hematoma and nondisplaced anterolateral left fourth and fifth rib fractures  - weaning per pulmonology  -RT for tracheostomy cares, oxygenation, nebs, pulmonary therapies  - Tracheostomy sutures removed (12/18/2023)  - Mucomyst, duoneb w/ RT   - SLP speaking valve as tolerated      S/p respiratory arrest 12/6  Hx of severe TR s/p TVR  HFpEF  Hx of Afib s/p AVN ablation - PPM, upgraded to leadless 7/2023  Hypertension  Hyperlipidemia  Hx of aortic dissection repair 2019  Respiratory arrest 12/6   - Stable paced rhythm with underlying atrial flutter. On review of cardiology, underlying aflutter/afib has been present since at least June of this year. Device RN adjusted PPM to PTA settings rate 60 at  [facility].   - Pre-op 9/8/23 echo: LVEF 55/60%, mildly dilated RV, normal function  - Echo 12/4 with LVEF 60-65%, normal RV function, TV mean gradient 5 mmHg, no TR  - Echo 12/7 with no significant change from previous with the exception of mild reduction of RV. RV free wall dyskinesia.   - ASA 81 mg daily  -was on scheduled midodrine 10 mg Q8H but stopped that 12/16/23 since BP in the 140s systolic and stable  -Continue midodrine 5 mg TID PRN for SBP<100   - Started Warfarin 12/15.    -Discontinued lovenox 12/26/2023. Continue with coumadin INR on goal.Pharmacy consulted for Coumadin dosing (INR goal 2-3)      Acute on chronic diastolic heart failure with Elevated BNP, improving   - Held PTA spironolactone, torsemide, metoprolol due to hypotension at prior hospital  - Echo 12/4 with LVEF 60-65%, normal RV function, TV mean gradient 5 mmHg, no TR  - Echo 12/7 with no significant change from previous with the exception of mild reduction of RV. RV free wall dyskinesia.   - FLUID STATUS: Pre-op weight 70.9 kg, discharge weight 74.1 kg (163 lbs) on 12/15/2023 and 165 lbs on 12/16  -increased torsemide to 40 mg po daily on 12/19 then switched to bumex 12/22 and increased to bumex 1 mg BID on 12/23- increased to 1.5, now increased bumex to 2 mg IV bid on 12/30  -proBNP trend appears improved from a peak of 53326 to now 3710.  -has a new blister on her right foot likely due to fluid retention      blister on top of Right foot  -  likely due to fluid retention  -blister popped on 12/30  -monitor for cellulitis    Multiple loose stools   Hx of C. Difficile (9/2023)  Severe malnutrition in the context of acute illness  - s/p tube feeds   -passed swallow on 1/2, diet changed from tube feeds to bite sized and thin liquids.    - PPI famotidine  - Bowel regimen discontinued due to continued loose stools  - Finished vancomycin po 7 day course   - PRN Zofran  4 mg PO or IV Q6H  - PRN Compazine 5 mg IV Q6H   - loperamide 2 mg BID and  prn     Hypernatremia   Hypokalemia  CKD stage 3  Lactic acidosis, resolved  IMAN on CKD, resolved   Edema  Baseline creat ~1.2-1.3   FLUID STATUS: Pre-op weight 70.9 kg, discharge weight 74.1 kg (163 lbs).   - Elevated sodium, Na 151 --> 147 on recheck, likely 2/2 aggressive diureses S/p bumex and chlorotiazide prior to transfer  -On free water flushes 30ml q4h  - I/O has been inaccurate due to unmeasured stools and voids.  - Replete lytes per protocol  - Avoid/limit nephrotoxins as able  - Strict I/O, daily weights, avoid/limit nephrotoxins     Stress induced hyperglycemia  T2DM  Hgb A1c 5.7%, stable off meds  - PTA Jardiance on hold, resume once medically optimized   - Goal BG <180 for optimal healing     Stress induced leukocytosis  Probable aspiration pneumonitis vs pneumonia, resolved  - 11/30 Zosyn 7-day course for pneumonia (finished)   - 12/6 Given further decompensation, Vanco and meropenem was started on 12/6   - Finished meropenem 7 day course (12/12)   - Finished Vancomycin PO 7 day course (for c-diff) (12/13)  - Blood culture with NGTD   - Urine with Candida and sputum with Yeast, likely both colonizations.   - Urine culture with NGTD     Hx of recent C. Difficile  - C. Difficile toxin PCR positive 12/3 with negative antigen and negative toxin - per antimicrobial stewardship team, more likely to be colonization with recent history and no apparent treatment  - Finished oral vancomycin course on 12/13, still with diarrhea but no abd pain, leucocytosis, or fevers so monitoring for now  - Monitor fever curve, WBC, and inflammatory markers as appropriate  - Enteric precautions per infection prevention protocol      3rd, 4th right rib fractures due to CPR  -Continue mini thoracotomy precautions  -Judicious use of narcotics, de-escalate as able     Acute blood loss anemia  - Hgb has been stable.   -Hgb 7.6 on 12/16 and 8.0 on 12/17/2023  -Iron studies: Serum iron 29, iron binding capacity 216, saturation index  "13%.  -Follow CBC with weekly labs.   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   -f/up outpt     Diet: Adult Formula Drip Feeding: Continuous Jevity 1.5; Nasojejunal; Goal Rate: 50; mL/hr; TF to run x22 hours holding 1 hr before/after warfarin  NPO for Medical/Clinical Reasons Except for: Ice Chips, NPO but receiving Tube Feeding    DVT Prophylaxis: Enoxaparin (Lovenox) subcutaneous transitioning to warfarin   Barrientos Catheter: Not present  Lines: PRESENT      PICC 12/07/23 Triple Lumen Right Basilic ok to use PICC-Site Assessment: WDL      Cardiac Monitoring: None  Code Status: No CPR- Do NOT Intubate      Clinically Significant Risk Factors              # Hypoalbuminemia: Lowest albumin = 2.7 g/dL at 12/18/2023  5:17 AM, will monitor as appropriate     # Hypertension: Noted on problem list    # Acute heart failure with preserved ejection fraction: heart failure noted on problem list, last echo with EF >50%, and receiving IV diuretics       # Overweight: Estimated body mass index is 28.95 kg/m  as calculated from the following:    Height as of this encounter: 1.613 m (5' 3.5\").    Weight as of this encounter: 75.3 kg (166 lb 0.1 oz).   # Severe Malnutrition: based on nutrition assessment      # Financial/Environmental Concerns: none   # Pacemaker present  # History of CABG: noted on surgical history       Disposition Plan     Expected Discharge Date: 01/03/2024      Destination: home with family;foster/protective services            Clarissa Ndiaye MD  Hospitalist Service  LTACH  Securely message with [employer]ong[employer] (more info)  Text page via [employer] Paging/Directory "   ______________________________________________________________________    Interval History   She is happy that she passed her swallow evaluation.  She is excited to eat.  Breathing has improved, edema of LE has improved    No chest pain, dyspnea with exertion (same as yesterday)    Physical Exam   Vital Signs: Temp: 98.1  F (36.7  C) Temp src: Oral BP: 133/63 Pulse: 72   Resp: 26 SpO2: 95 % O2 Device: Trach dome Oxygen Delivery: 35 LPM  Weight: 166 lbs .1 oz    GENERAL: mild respiratory distress  HEENT: Head is normocephalic atraumatic eyes pupils appear equal round and reactive to light.  NECK: Supple, Tracheostomy in place  NOSE: NJ tube in place  LUNGS: rhonchi bilaterally, decreased breath sounds at bases   HEART: S1 S2, Rate and rhythm is regular. No murmurs, 2+ BLE edema  ABDOMEN: Soft, nontender, no distension. Bowel sounds are positive. No guarding or rebound.  :  stool coming out of vagina    EXTREMITIES: 2+ bilateral lower extremity edema noted   SKIN: popped blister R foot, +erythema of b/l lower extremities   PSYCHIATRIC: Normal affect and cognition     Medical Decision Making       68 MINUTES SPENT BY ME on the date of service doing chart review, history, exam, documentation & further activities per the note.      Data   Lab Results   Component Value Date    WBC 6.1 12/28/2023    WBC 5.3 06/14/2021     Lab Results   Component Value Date    RBC 2.64 12/28/2023    RBC 4.33 06/14/2021     Lab Results   Component Value Date    HGB 7.8 12/28/2023    HGB 12.8 06/14/2021     Lab Results   Component Value Date    HCT 25.9 12/28/2023    HCT 40.1 06/14/2021     Lab Results   Component Value Date    MCV 98 12/28/2023    MCV 93 06/14/2021     Lab Results   Component Value Date    MCH 29.5 12/28/2023    MCH 29.6 06/14/2021     Lab Results   Component Value Date    MCHC 30.1 12/28/2023    MCHC 31.9 06/14/2021     Lab Results   Component Value Date    RDW 17.9 12/28/2023    RDW 13.5 06/14/2021     Lab Results    Component Value Date     12/28/2023     06/14/2021       Last Comprehensive Metabolic Panel:  Sodium   Date Value Ref Range Status   01/01/2024 145 135 - 145 mmol/L Final     Comment:     Reference intervals for this test were updated on 09/26/2023 to more accurately reflect our healthy population. There may be differences in the flagging of prior results with similar values performed with this method. Interpretation of those prior results can be made in the context of the updated reference intervals.    06/23/2021 138 133 - 144 mmol/L Final     Potassium   Date Value Ref Range Status   01/02/2024 3.9 3.4 - 5.3 mmol/L Final   12/13/2022 3.8 3.4 - 5.3 mmol/L Final   06/23/2021 4.5 3.4 - 5.3 mmol/L Final     Potassium POCT   Date Value Ref Range Status   12/27/2023 5.3 (H) 3.5 - 5.0 mmol/L Final     Chloride   Date Value Ref Range Status   01/01/2024 100 98 - 107 mmol/L Final   12/13/2022 99 94 - 109 mmol/L Final   06/23/2021 104 94 - 109 mmol/L Final     Carbon Dioxide   Date Value Ref Range Status   06/23/2021 31 20 - 32 mmol/L Final     Carbon Dioxide (CO2)   Date Value Ref Range Status   01/01/2024 40 (H) 22 - 29 mmol/L Final   12/13/2022 33 (H) 20 - 32 mmol/L Final     Anion Gap   Date Value Ref Range Status   01/01/2024 5 (L) 7 - 15 mmol/L Final   12/13/2022 7 3 - 14 mmol/L Final   06/23/2021 3 3 - 14 mmol/L Final     Glucose   Date Value Ref Range Status   01/01/2024 108 (H) 70 - 99 mg/dL Final   12/13/2022 84 70 - 99 mg/dL Final   06/23/2021 86 70 - 99 mg/dL Final     GLUCOSE BY METER POCT   Date Value Ref Range Status   12/30/2023 101 (H) 70 - 99 mg/dL Final     Urea Nitrogen   Date Value Ref Range Status   01/01/2024 36.9 (H) 8.0 - 23.0 mg/dL Final   12/13/2022 31 (H) 7 - 30 mg/dL Final   06/23/2021 30 7 - 30 mg/dL Final     Creatinine   Date Value Ref Range Status   01/01/2024 0.95 0.51 - 0.95 mg/dL Final   06/23/2021 1.29 (H) 0.52 - 1.04 mg/dL Final     GFR Estimate   Date Value Ref Range  Status   01/01/2024 60 (L) >60 mL/min/1.73m2 Final   06/23/2021 39 (L) >60 mL/min/[1.73_m2] Final     Comment:     Non  GFR Calc  Starting 12/18/2018, serum creatinine based estimated GFR (eGFR) will be   calculated using the Chronic Kidney Disease Epidemiology Collaboration   (CKD-EPI) equation.       Calcium   Date Value Ref Range Status   01/01/2024 8.9 8.8 - 10.2 mg/dL Final   06/23/2021 9.2 8.5 - 10.1 mg/dL Final     Bilirubin Total   Date Value Ref Range Status   01/01/2024 0.7 <=1.2 mg/dL Final   04/04/2019 1.0 0.2 - 1.3 mg/dL Final     Alkaline Phosphatase   Date Value Ref Range Status   01/01/2024 122 40 - 150 U/L Final     Comment:     Reference intervals for this test were updated on 11/14/2023 to more accurately reflect our healthy population. There may be differences in the flagging of prior results with similar values performed with this method. Interpretation of those prior results can be made in the context of the updated reference intervals.   04/04/2019 104 40 - 150 U/L Final     ALT   Date Value Ref Range Status   01/01/2024 8 0 - 50 U/L Final     Comment:     Reference intervals for this test were updated on 6/12/2023 to more accurately reflect our healthy population. There may be differences in the flagging of prior results with similar values performed with this method. Interpretation of those prior results can be made in the context of the updated reference intervals.     06/14/2021 22 0 - 50 U/L Final     AST   Date Value Ref Range Status   01/01/2024 28 0 - 45 U/L Final     Comment:     Reference intervals for this test were updated on 6/12/2023 to more accurately reflect our healthy population. There may be differences in the flagging of prior results with similar values performed with this method. Interpretation of those prior results can be made in the context of the updated reference intervals.   04/04/2019 27 0 - 45 U/L Final

## 2024-01-02 NOTE — PROGRESS NOTES
"SPEECH PATHOLOGY: VIDEO SWALLOW STUDY      01/02/24 1013   Appointment Info   Signing Clinician's Name / Credentials (SLP) David Coronado MA [facility] SLP   Rehab Comments (SLP) NJ tube removed by nursing prior to evaluation   General Information   Onset of Illness/Injury or Date of Surgery 11/28/23   Referring Physician TATIANA Kennedy   Patient/Family Therapy Goal Statement (SLP) To eat and drink   Pertinent History of Current Problem Per pulmonology note: \"83 yoF with PMH of HFpEF, severe TR, permanent AF s/p ablation & leadless PPM 7/31/23, emergent aortic dissection repair 1/2019, HTN, CKD, NEDA, and obesity who presented as an outpatient for elective TVR completed on 11/28/2023. Subsequently had respiratory arrest on 12/6 with short CPR prior to ROSC. Treated for presumed pneumonia with multiple courses of abx, now off.  S/p trach on 12/13. She has been progressing with trach dome and intermittently on PS. Fluid overloaded treated with diuretics.\" Per hospitalist note, patient plans to resume eating and drinking regardless of results of video swallow study. NJ tube to be removed prior to evaluation d/t potential impact on swallow mechanism.   General Observations Alert and cooperative. Speaking valve in place with TM   Type of Evaluation   Type of Evaluation Swallow Evaluation   Tracheostomy Assessment (Speaking Valve)   Comment, Tracheostomy (Speaking Valve) #6 Bivona, speaking valve in place.   General Swallowing Observations   Current Diet/Method of Nutritional Intake (General Swallowing Observations, NIS) NPO;ice chips;nasogastric tube (NG)   Past History of Dysphagia Clinical swallow evaluation 12/15/2023 at [facility] hospital with recomendation for NPO except for ice chips. Per SLP note 12/15/2023: \"Pt familiar to SLP caseload during admission to [place] in 2019. At that time, pt complete VFSS which demonstrated penetration with thin liquids and aspiration w/cough of mildly thick liquids. A regular diet and honey " "thick liquids was recommended at that time. Since then, pt has not followed with SLP and self-advanced to thin liquids. Pt denied any recent trouble swallowing and reported no hx of PNA. Pt evaluated earlier this admission with recommendations for regular diet and thin liquids. SLP offerred repeat VFSS at that time d/t report hx of aspiration, however pt politely declined d/t no concerns related to swallowing. Pt now apprehensive regarding eating/drinking and wanting to 'take things slow'\". Video swallow study completed earlier this admission: \"Patient presents with mild-moderate oropharyngeal dysphagia characterized by variability in timeliness of swallow initiation and airway closure, as well as pharyngeal clearance, leading to inconsistent laryngeal penetration with multiple consistencies, with greatest risk with thinner consistencies from direct aspiration and greater risk with thicker consistencies from kickback after the swallow from pharyngeal stasis. It is possible that trach size as well as presence of nasal feeding tube are negatively impacting swallow function.\" Has since had trach downsized.   Swallowing Evaluation Videofluoroscopic swallow study (VFSS)   VFSS Evaluation   Views Taken left lateral   Physical Location of Procedure [facility]   VFSS Textures Trialed thin liquids;mildly thick liquids;moderately thick liquids/liquidized;pureed;solid foods   VFSS Eval: Thin Liquid Texture Trial   Mode of Presentation, Thin Liquid straw;cup   Preparatory Phase WFL   Oral Phase, Thin Liquid premature pharyngeal entry   Bolus Location When Swallow Triggered pyriforms   Pharyngeal Phase, Thin Liquid impaired epiglottic movement  (delayed)   Rosenbek's Penetration Aspiration Scale: Thin Liquid Trial Results 2 - contrast enters airway, remains above the vocal cords, no residue remains (penetration)  (shallow to deep)   Strategies and Compensations chin tuck;reduce bolus size   Diagnostic Statement Use of " chin tuck was not effective in reducing aspiration risk, but small sip strategy appeared to be.   VFSS Eval: Mildly Thick Liquids   Mode of Presentation cup   Preparatory Phase WFL   Oral Phase WFL   Bolus Location When Swallow Triggered valleculae;posterior laryngeal surface of epiglottis   Pharyngeal Phase WFL   Rosenbek's Penetration Aspiration Scale 1 - no aspiration, contrast does not enter airway   VFSS Eval: Moderately Thick Liquids   Mode of Presentation spoon   Preparatory Phase WFL   Oral Phase WFL   Bolus Location When Swallow Triggered posterior angle of ramus   Pharyngeal Phase WFL   Rosenbek's Penetration Aspiration Scale 1 - no aspiration, contrast does not enter airway   VFSS Evaluation: Puree Solid Texture Trial   Mode of Presentation, Puree spoon   Preparatory Phase WFL   Oral Phase, Puree WFL   Bolus Location When Swallow Triggered posterior angle of ramus   Pharyngeal Phase, Puree WFL   Rosenbek's Penetration Aspiration Scale: Puree Food Trial Results 1 - no aspiration, contrast does not enter airway   VFSS Evaluation: Solid Food Texture Trial   Mode of Presentation, Solid self-fed   Preparatory Phase other (see comments);prolonged bolus preparation  (extremely small bite size)   Oral Phase, Solid residue in oral cavity  (mild)   Bolus Location When Swallow Triggered posterior angle of ramus   Pharyngeal Phase, Solid WFL   Rosenbek's Penetration Aspiration Scale: Solid Food Trial Results 1 - no aspiration, contrast does not enter airway   Esophageal Phase of Swallow   Patient reports or presents with symptoms of esophageal dysphagia No   Swallowing Recommendations   Diet Consistency Recommendations thin liquids (level 0);soft & bite-sized (level 6)   Supervision Level for Intake distant supervision needed   Mode of Delivery Recommendations bolus size, small;slow rate of intake   Monitoring/Assistance Required (Eating/Swallowing) optimize oral intake to minimize need for tube feeding;monitor for  "cough or change in vocal quality with intake;stop eating activities when fatigue is present   Recommended Feeding/Eating Techniques (Swallow Eval) maintain upright sitting position for eating;maintain upright posture during/after eating for 30 minutes   Medication Administration Recommendations, Swallowing (SLP) via puree   Comment, Swallowing Recommendations Patient needed encouragement to complete test, frequently saying \"I'm done\". Agreed to complete test ultimately.   General Therapy Interventions   Planned Therapy Interventions Dysphagia Treatment   Dysphagia treatment Instruction of safe swallow strategies;Compensatory strategies for swallowing;Modified diet education   Clinical Impression   Criteria for Skilled Therapeutic Interventions Met (SLP Eval) Yes, treatment indicated   SLP Diagnosis Dysphagia   Risks & Benefits of therapy have been explained evaluation/treatment results reviewed;care plan/treatment goals reviewed;risks/benefits reviewed;participants voiced agreement with care plan;participants included;patient   Clinical Impression Comments Improved swallow function from previous video swallow study. Presents with mild oral dysphagia, characterized by extremely small bite size, that limited ability to fully assess regular food textures. Also presented with delayed swallow reflex allowing pourover past open airway and into pyriform sinuses prior to initiation of swallow and subsequently laryngeal penetration. Depth was variable, from shallow to deep. Compensatory strategies of chin tuck and reduced bolus size were trialed. Reduced bolus size appeared to be generally effective in reducing frequency and risk of entrance into the airway. Chin tuck strategy did not show the same potential.   SLP Total Evaluation Time   Evaluation, videofluoroscopic eval of swallow function Minutes (38250) 15   SLP Goals   Therapy Frequency (SLP Eval) 5 times/week   SLP Predicted Duration/Target Date for Goal Attainment " 02/26/24   SLP Goals Swallow   SLP: Tolerate valve placement without change in vital signs Goal Met   SLP: Demonstrate clear voicing at 3 foot distance from listener Goal Met   Swallowing Dysfunction &/or Oral Function for Feeding   Treatment of Swallowing Dysfunction &/or Oral Function for Feeding Minutes (83885) 8   Treatment Detail/Skilled Intervention Under fluoroscopy, patient was instructed in use of chin tuck and reduced bolus size strategy. Patient did demonstrate understanding of instruction as evidenced by proper execution of strategy. However, she did need repetition of instruction. Result: Strategy of reduced bolus size appeared to be effective in reducing risk of entrance into the airway. Strategy of chin tuck did not appear to consistently or completely reduce the risk of aspiration.   SLP Discharge Planning   SLP Plan diet f/u and trials of regular food textures.   SLP Discharge Recommendation [facility]-Motivated patient will benefit from intensive, interdisciplinary therapy.  Anticipate will be able to tolerate 3 hours of therapy per day   SLP Rationale for DC Rec Swallow below baseline; persisting trach   SLP Brief overview of current status  Appropriate to initiate diet of soft and bite sized food and thin liquids with use of small sip strategy.   Total Session Time   Total Session Time (sum of timed and untimed services) 23

## 2024-01-02 NOTE — PLAN OF CARE
"Problem: Artificial Airway  Goal: Effective Communication  Outcome: Progressing  Goal: Optimal Device Function  Outcome: Progressing  Intervention: Optimize Device Care and Function  Recent Flowsheet Documentation  Taken 1/1/2024 2006 by Haim Mix, RT  Airway Safety Measures: all equipment/monitors on and audible  Goal: Absence of Device-Related Skin or Tissue Injury  Outcome: Progressing     Problem: Mechanical Ventilation Invasive  Goal: Optimal Device Function  Intervention: Optimize Device Care and Function  Recent Flowsheet Documentation  Taken 1/1/2024 2006 by Haim Mix, RT  Airway Safety Measures: all equipment/monitors on and audible        RT PROGRESS NOTE     DATA:     CURRENT SETTINGS: 14,400, +5             TRACH TYPE / SIZE: #6 Bivona, placed on 12/22             MODE: AC             FIO2: 30%     ACTION:             THERAPIES: Albuterol BID             SUCTION:                           FREQUENCY: x2                        AMOUNT: scant                        CONSISTENCY: thick                        COLOR: pale yellow             SPONTANEOUS COUGH EFFORT/STRENGTH OF EFFORT (not elicited by suctioning): good productive              WEANING PHASE: 2                        WEAN MODE: 30%/35L TM/PMV                        WEAN TIME: 16:50                        END WEAN REASON: AC for night      RESPONSE:             BS: Clear             VITAL SIGNS: /58 (BP Location: Left arm)   Pulse 60   Temp 98.2  F (36.8  C) (Oral)   Resp 24   Ht 1.613 m (5' 3.5\")   Wt 74.5 kg (164 lb 3.2 oz)   SpO2 100%   BMI 28.63 kg/m                   EMOTIONAL NEEDS / CONCERNS:                  RISK FOR SELF DECANNULATION:                          RISK DUE TO:                          INTERVENTION/S IN PLACE IS/ARE:         NOTE / PLAN:  TM/PMV days and AC at night.                                                                                           "

## 2024-01-02 NOTE — PLAN OF CARE
Goal Outcome Evaluation:               Problem: Adult Inpatient Plan of Care  Goal: Optimal Comfort and Wellbeing  Outcome: Progressing  Intervention: Provide Person-Centered Care  Recent Flowsheet Documentation  Taken 1/1/2024 1700 by aPula Burleson RN  Trust Relationship/Rapport:   care explained   choices provided   Pt c/o of nausea, prn Zofran 4 mg I.v was given, pt denies feeling nauseous after 30 minutes of medication administration, Staff will monitor pt closely.

## 2024-01-02 NOTE — PROGRESS NOTES
CLINICAL NUTRITION SERVICES - REASSESSMENT NOTE      Recommendations Ordered by Registered Dietitian (RD):   Apple Ensure Clear daily  Continue MVI/M   Future/Additional Recommendations:   Monitor PO intakes, provide high kcal/protein diet education in a couple days once more adjusted to oral diet again.   Check acceptance of oral nutrition supplements, adjust PRN   Malnutrition:   Previously documented severe malnutrition in the context of acute illness      EVALUATION OF PROGRESS TOWARD GOALS   Diet:    Orders Placed This Encounter      Soft & Bite Sized Diet (level 6) Thin Liquids (level 0)    Nutrition Support:  Nutrition Support: Jevity 1.5 at 50 mL/hr x 22 hrs per ND tube (hold 1 hr before and after warfarin) with 30 mL water every 4 hours. (Formula changed to higher fiber 12/22/23 - was on Osmolite 1.5 with 4 banatrol tf per day prior)  Provides:1100ml/day) + 1 pkt Prosource TF20 provides: 1730 kcals, 90 g PRO, 836 ml free H20, 238 g CHO, and 23 g fiber daily.  836 mL free water + 240 mL water flushes = 1076 mL water/day.    Intake/Tolerance:    Patient happy to start oral diet today, says she does not have much of an appetite yet and only wants to take small sips. She states that she is nervous to chew food at this time. Her family has brought in some baby food and plans to bring in smoothies.  - does not like taste of milk, prefers to try Ensure Clear first    Total EN volume received:  6-day average: 862mL, 78% of prescribed volume received, if intakes accurately recorded.    ASSESSED NUTRITION NEEDS:  Dosing Weight: 58.9 kg adjusted weight  Estimated Energy Needs: 6507-1911 kcals/day (25 - 30 kcals/kg)  Justification: Maintenance  Estimated Protein Needs: 70-88 grams protein/day (1.2 - 1.5 grams of pro/kg)  Justification: Hypercatabolism with acute illness  Estimated Fluid Needs: 7051-8005 mL/day (1 mL/kcal)   Justification: Maintenance    NEW FINDINGS:   - per MD dustin discussed GOC with patient, her  goals are to eat no matter the risk. She has a VFSS scheduled for this morning and her NDT will be removed prior to this.   - Bilateral LE appear edematous    Skin: blister on right dorsal foot 2/2 edema  I/O: +17L in 2 weeks per chart  BM: stooling 3-4x/day most days, notable 9 BMs charted on 12/29  Meds: IV Bumex 2mg/day, imodium BID, remeron, protonix, Kcl, Warfarin  Electrolytes: ongoing hypermagnesemia, Na high-end normal limits  BG: remains WNL  Weight: relatively stable w/ fluid shifts. First and latest standing scale weights remain stable  12/31/23 0800 75.2 kg (165 lb 11.2 oz) Standing scale     12/18/23 0846 75.1 kg (165 lb 9.6 oz) Standing scale     Labs:  Electrolytes  Potassium (mmol/L)   Date Value   01/02/2024 3.9   01/01/2024 3.9   12/31/2023 3.2 (L)   12/13/2022 3.8   09/27/2022 4.0   07/22/2022 4.0   06/23/2021 4.5   06/14/2021 4.0   03/23/2021 4.1     Potassium POCT (mmol/L)   Date Value   12/27/2023 5.3 (H)   12/27/2023 4.8   12/21/2023 4.0     Phosphorus (mg/dL)   Date Value   01/02/2024 4.1   01/01/2024 3.2   12/31/2023 3.2   12/30/2023 3.6   12/29/2023 3.8   01/28/2019 4.1   01/24/2019 4.0   01/24/2019 4.0   01/23/2019 2.7   01/21/2019 3.5    Blood Glucose  Glucose (mg/dL)   Date Value   01/01/2024 108 (H)   12/31/2023 135 (H)   12/30/2023 98   12/29/2023 124 (H)   12/28/2023 124 (H)   12/13/2022 84   09/27/2022 87   07/22/2022 89   06/30/2022 89   06/13/2022 93   06/23/2021 86   06/14/2021 78   03/23/2021 76   12/29/2020 80   10/30/2020 94     GLUCOSE BY METER POCT (mg/dL)   Date Value   12/30/2023 101 (H)   12/30/2023 93   12/23/2023 120 (H)   12/23/2023 120 (H)   12/22/2023 117 (H)     Glucose Whole Blood POCT (mg/dL)   Date Value   12/27/2023 107   12/27/2023 167 (H)     Hemoglobin A1C (%)   Date Value   11/07/2023 5.7 (H)   01/30/2019 5.6   01/04/2019 5.3    Inflammatory Markers  WBC (10e9/L)   Date Value   06/14/2021 5.3   04/04/2019 6.7   01/29/2019 6.6     WBC Count (10e3/uL)   Date  Value   01/01/2024 6.8   12/31/2023 5.8   12/28/2023 6.1     Albumin (g/dL)   Date Value   01/01/2024 3.1 (L)   12/31/2023 2.9 (L)   12/28/2023 3.1 (L)   04/04/2019 3.2 (L)   03/24/2019 3.0 (L)   03/23/2019 2.8 (L)   03/21/2019 3.0 (L)   01/28/2019 2.7 (L)   01/24/2019 3.1 (L)      Magnesium (mg/dL)   Date Value   01/02/2024 2.4 (H)   01/01/2024 2.5 (H)   12/31/2023 2.4 (H)   01/28/2019 2.4 (H)   01/25/2019 2.3   01/23/2019 2.2     Sodium (mmol/L)   Date Value   01/01/2024 145   12/31/2023 145   12/30/2023 145   06/23/2021 138   06/14/2021 138   03/23/2021 140    Renal  Urea Nitrogen (mg/dL)   Date Value   01/01/2024 36.9 (H)   12/31/2023 37.2 (H)   12/30/2023 39.7 (H)   12/13/2022 31 (H)   09/27/2022 29   07/22/2022 30   06/23/2021 30   06/14/2021 29   03/23/2021 29     Creatinine (mg/dL)   Date Value   01/01/2024 0.95   12/31/2023 0.96 (H)   12/30/2023 1.02 (H)   06/23/2021 1.29 (H)   06/14/2021 1.27 (H)   03/23/2021 1.06 (H)     Additional  Triglycerides (mg/dL)   Date Value   06/14/2021 71   01/28/2019 96   01/21/2019 131     Ketones Urine (mg/dL)   Date Value   12/10/2023 Negative   04/04/2019 Negative        Previous Goals:   Meet > 75% of estimated nutrition needs - met  No weight loss below 69 kg - met  <3 loose BM/day - not met    Previous Nutrition Diagnosis:   Inadequate oral intake related to mechanical ventilation as evidenced by NPO, reliance on TF and trach   Evaluation: Improving    CURRENT NUTRITION DIAGNOSIS  Predicted inadequate nutrient intake related to TF weaning, restricted diet texture as evidenced by soft and bite-sized diet, poor appetite, patient fears to eat solid foods.    INTERVENTIONS  Recommendations / Nutrition Prescription  See top of note.    Implementation  Medical Food Supplement and Multivitamin/Mineral    Goals  Maintain dry weight  PO intakes to meet at least 75% estimated caloric and protein needs    MONITORING AND EVALUATION:  Progress towards goals will be monitored and  evaluated per protocol and Practice Guidelines    Angelita Smith RDN, LD  Clinical Dietitian

## 2024-01-02 NOTE — PLAN OF CARE
Problem: Enteral Nutrition  Goal: Feeding Tolerance  Outcome: Adequate for Care Transition   Goal Outcome Evaluation:  Patient transitioned to PO diet today. Question ability to meet nutrition needs orally. Will monitor PO intakes and provide diet education once patient has had a few days of eating.

## 2024-01-02 NOTE — PLAN OF CARE
Goal Outcome Evaluation:           Problem: Adult Inpatient Plan of Care  Goal: Optimal Comfort and Wellbeing  Intervention: Provide Person-Centered Care  Recent Flowsheet Documentation  Taken 1/2/2024 1034 by Shelly Qureshi RN  Trust Relationship/Rapport:   care explained   choices provided  Patient feeding tube ( N[place] J) discontinued by the swat nurse prior to taking her for video swallow study. Procedure completed, pt back on the unit, see speech therapist notes for details on procedure. Patient took her 14:00 medication with apple source and thin with no difficulties observed. Changed right leg wound dressing, noted blister still filled with fluids.

## 2024-01-02 NOTE — PROGRESS NOTES
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 Admitted for elective TVR completed 11/28/2023. Subsequent respiratory arrest on 12/6. Completed multiple courses of antibiotics for presumed pneumonia  S/p trach 12/13.     Transferred to LTAC 12/15. Progressed to continuous trach dome 12/20-26. Attempted 24/7 capping 12/26 with worsening hypercapnia 12/27 placed back on vent.  Suspect d/t acute on chronic volume overload, diuretics increased. Now tolerating TM/PMV during the day and AC at night.    Assessment/Plan:     Problems:  Acute hypercapnic/hypoxic respiratory failure s/p trach  Tracheostomy in place: 6 Bivona 12/22  Dysphagia s/p NJ: passed BDT  Respiratory/cardiac arrest 12/6 possibly d/t mucus plugging  S/p TVR, HFpEF(55-60%), b/l leg edema  Anticoagulation with coumadin for A-fib and bioprosthetic heart valve  Anxiety    Recommendations:  Phase 3: capping day, TM/PMV night  Check blood gas in a couple days if can stay off vent   Continue diuresis per primary. Monitor CO2 and if continues to uptrend, will consider augmenting with diamox   Albuterol nebs PRN  Routine trach care per protocol   Repeat VFSS pending   Keep progressing towards end goal of decannulation  Subjective:   - feels good today.  No respiratory complaints.  Eager to start a diet     Tracheal secretions: scant amounts     Cough strength: strong    Ventilator weaning results  - continuous TM 12/20-26  - 12/26: continuous capping   - 12/27: VBG am 7.31/76, placed back on vent.  Capped later ~2hr, dyspnea, improved on TM/PMV.  AC night   - 12/28: Capped 3L for 20 min; sob.  40L/35%TM/PMV for 13.5hr.  AC night   -12/29-present: TM/PMV day(13-14hr on avg).  Vent night.      Clinical status discussed today  "with respiratory therapist       Medications:      dextrose      - MEDICATION INSTRUCTIONS -        albuterol  2.5 mg Nebulization 2 times daily    [START ON 1/3/2024] aspirin  81 mg Oral Daily    bumetanide  2 mg Intravenous Q12H    [START ON 1/3/2024] escitalopram  10 mg Oral Daily    [START ON 1/3/2024] loperamide  2 mg Oral BID 09 12    loperamide  2 mg Oral Once    melatonin  5 mg Oral QPM    miconazole   Topical BID    mirtazapine  15 mg Oral At Bedtime    [START ON 1/3/2024] multivitamin w/minerals  1 tablet Oral Daily    [START ON 1/3/2024] pantoprazole  40 mg Oral QAM AC    potassium chloride  20 mEq Oral or Feeding Tube BID    saccharomyces boulardii  250 mg Oral BID    sodium chloride (PF)  10 mL Intracatheter Q8H    warfarin ANTICOAGULANT  3 mg Oral ONCE at 18:00    Warfarin Therapy Reminder  1 each Oral See Admin Instructions         Exam/Data:   Vitals  /63 (BP Location: Left arm, Patient Position: Semi-Hitchcock's, Cuff Size: Adult Regular)   Pulse 61   Temp 98.1  F (36.7  C) (Oral)   Resp 28   Ht 1.613 m (5' 3.5\")   Wt 75.3 kg (166 lb 0.1 oz)   SpO2 95%   BMI 28.95 kg/m       I/O last 3 completed shifts:  In: 1140 [NG/GT:1140]  Out: 350 [Urine:350]  Weight change: -0.68 kg (-1 lb 8 oz)    Vent Mode: (S) Other (see comments) (Cap NC)  FiO2 (%): 40 %  Resp Rate (Set): 14 breaths/min  Tidal Volume (Set, mL): 400 mL  PEEP (cm H2O): 5 cmH2O  Resp: 28      EXAM:  Gen: NAD in chair with trach capped   HEENT: NG tube in place, trach midline/intact, no stridor   CV: RRR, no m/g/r  Resp: CTAB; non-labored, no wheeze  Abd: soft, nontender  Skin: b/l lower extremity rash(improved), large bulla on right dorsum(wrapped)  Ext: 2-3+ b/l lower extremity edema  Neuro: alert, follows commands     Labs:  Venous Blood Gas  Recent Labs   Lab 12/28/23  0724 12/27/23  1007 12/27/23  0553   PHV 7.43 7.37 7.31*   PCO2V 57* 65* 76*   PO2V 31 31 33   HCO3V 38* 34* 33*   SHAWN 13.6 11.9 11.7       Complete Blood Count "   Recent Labs   Lab 01/01/24  0615 12/31/23  0506 12/28/23  0604 12/27/23  1007   WBC 6.8 5.8 6.1  --    HGB 7.7* 7.4* 7.8* 8.1*    275 212  --      Basic Metabolic Panel  Recent Labs   Lab 01/02/24  0550 01/01/24  0615 12/31/23  0506 12/30/23  0623 12/30/23  0620 12/30/23  0439 12/29/23  0608   NA  --  145 145 145  --   --  144   POTASSIUM 3.9 3.9 3.2* 3.5  --   --  4.0   CHLORIDE  --  100 100 99  --   --  99   CO2  --  40* 38* 39*  --   --  38*   BUN  --  36.9* 37.2* 39.7*  --   --  43.1*   CR  --  0.95 0.96* 1.02*  --   --  1.13*   GLC  --  108* 135* 98 101*   < > 124*    < > = values in this interval not displayed.     Liver Function Tests  Recent Labs   Lab 01/02/24  0550 01/01/24  0615 12/31/23  0506 12/30/23  0623 12/29/23  0608 12/28/23  0604   AST  --  28 20  --   --  20   ALT  --  8 9  --   --  12   ALKPHOS  --  122 118  --   --  140   BILITOTAL  --  0.7 0.6  --   --  0.7   ALBUMIN  --  3.1* 2.9*  --   --  3.1*   INR 1.74* 1.69* 2.39* 3.49*   < > 3.45*    < > = values in this interval not displayed.     Coagulation Profile  Recent Labs   Lab 01/02/24  0550 01/01/24  0615 12/31/23  0506 12/30/23 0623   INR 1.74* 1.69* 2.39* 3.49*       Radiology: Personally reviewed; radiology read below    XR CHEST 12/27/2023  Tracheostomy. Sternotomy and valve replacement. Enteric tube within the stomach. Right upper extremity PICC line tip in the distal SVC. Heart is mildly enlarged, unchanged. Elevation of the right hemidiaphragm, unchanged. Minimal atelectasis at the right lung base. Lungs otherwise appear clear    XR CHEST 12/19/2023  Poststernotomy changes with a prosthetic TVR and tracheostomy tube tube in place. Feeding tube can be seen coursing into the stomach. Right upper extremity PICC line catheter overlies the proximal right atrium. Elevation of the right hemidiaphragm, unchanged with small right effusion and right basilar and likely middle lobe opacities, presumed atelectasis. Left lung is clear.  Heart is slightly enlarged. No signs of failure.    Matt Kennedy CNP  Pulmonary Medicine  [facility]  Pager [phone]  Office [phone]

## 2024-01-03 NOTE — PROGRESS NOTES
Care Management Follow Up    Length of Stay (days): 19    Expected Discharge Date: 01/12/2024     Concerns to be Addressed: other (see comments)  no concerns identified today  Patient plan of care discussed at interdisciplinary rounds: Yes    Anticipated Discharge Disposition:       Anticipated Discharge Services:    Anticipated Discharge DME:      Patient/family educated on Medicare website which has current facility and service quality ratings:    Education Provided on the Discharge Plan:    Patient/Family in Agreement with the Plan:      Referrals Placed by CM/SW:    Private pay costs discussed: Not applicable    Additional Information:  Patient discussed at IDT rounds and chart reviewed.  Met briefly with patient yesterday afternoon to discuss times for CPM.  Set up for next Monday at 2 pm.  Did also speak with patient's daughter, Susie, who will be coming for the CPM.  Patient did have her NJ tube removed prior to her video swallow study.  She is cleared for soft and bite-sized foods with thin liquids with small sips. Per PT, patient is stubborn but working with PT 2 x day with contact guard assist and 4 WW.  Per Pulm, trach dome during the day and vent at night.      Caroline Willett RN, BSN  Care Coordination  [facility]  [phone]

## 2024-01-03 NOTE — PLAN OF CARE
"Problem: Artificial Airway  Goal: Effective Communication  Outcome: Progressing  Goal: Optimal Device Function  Outcome: Progressing  Intervention: Optimize Device Care and Function  Recent Flowsheet Documentation  Taken 1/2/2024 2000 by Haim Mix RT  Airway Safety Measures: all equipment/monitors on and audible  Goal: Absence of Device-Related Skin or Tissue Injury  Outcome: Progressing     Problem: Mechanical Ventilation Invasive  Goal: Effective Communication  Outcome: Met  Goal: Optimal Device Function  Outcome: Met  Intervention: Optimize Device Care and Function  Recent Flowsheet Documentation  Taken 1/2/2024 2000 by Haim Mix RT  Airway Safety Measures: all equipment/monitors on and audible  Goal: Mechanical Ventilation Liberation  Outcome: Met  Goal: Absence of Device-Related Skin and Tissue Injury  Outcome: Met  Goal: Absence of Ventilator-Induced Lung Injury  Outcome: Met     RT PROGRESS NOTE     DATA:     CURRENT SETTINGS: 14,400, +5 stby             TRACH TYPE / SIZE: #6 Bivona, placed on 12/22             MODE: TM/PMV             FIO2: 26%/30L     ACTION:             THERAPIES:              SUCTION:                           FREQUENCY: x1                        AMOUNT: small                        CONSISTENCY: thick                        COLOR: pale yellow             SPONTANEOUS COUGH EFFORT/STRENGTH OF EFFORT (not elicited by suctioning): good productive              WEANING PHASE: 3                        WEAN MODE: capping                         WEAN TIME: 11:45                        END WEAN REASON: TM/PMV at night     RESPONSE:             BS: Clear             VITAL SIGNS: /64 (BP Location: Left arm)   Pulse 62   Temp 98  F (36.7  C) (Oral)   Resp 20   Ht 1.613 m (5' 3.5\")   Wt 75.3 kg (166 lb 0.1 oz)   SpO2 92%   BMI 28.95 kg/m                  EMOTIONAL NEEDS / CONCERNS:                  RISK FOR SELF DECANNULATION:                          RISK DUE TO:             "              INTERVENTION/S IN PLACE IS/ARE:         NOTE / PLAN:  capping days and TM/PMV at night.

## 2024-01-03 NOTE — PLAN OF CARE
Problem: Artificial Airway  Goal: Effective Communication  Outcome: Progressing  Goal: Optimal Device Function  Outcome: Progressing  Intervention: Optimize Device Care and Function  Recent Flowsheet Documentation  Taken 12/31/2023 1515 by Elias Donald, RT  Airway Safety Measures: all equipment/monitors on and audible  Taken 12/31/2023 1100 by Elias Donald, RT  Airway Safety Measures: all equipment/monitors on and audible  Taken 12/31/2023 0727 by Elias Donald, RT  Airway Safety Measures: all equipment/monitors on and audible  Goal: Absence of Device-Related Skin or Tissue Injury  Outcome: Progressing   RT PROGRESS NOTE     DATA:     CURRENT SETTINGS:ac/14/400/+5             TRACH TYPE / SIZE:  #6 bivona 12/22             MODE:   cap days , tm/pmv at night.             FIO2:   30%     ACTION:             THERAPIES:                SUCTION:                           FREQUENCY:   x2                        AMOUNT:   small/mod                        CONSISTENCY:   thick                        COLOR:   pale/yellow             SPONTANEOUS COUGH EFFORT/STRENGTH OF EFFORT (not elicited by suctioning):                               WEANING PHASE:  3                         WEAN MODE:    cap/2L                        WEAN TIME:   11 hrs and 2 min                        END WEAN REASON:  patient requested      RESPONSE:             BS:   crs             VITAL SIGNS:   SPO2 94-97%. RR 18-22             EMOTIONAL NEEDS / CONCERNS:                  RISK FOR SELF DECANNULATION:                          RISK DUE TO:                          INTERVENTION/S IN PLACE IS/ARE:         NOTE / PLAN:   Goal Outcome Evaluation:  Patient capped on 2L for 11 hrs and 2 min, patient requested to go back to tm/pmv,   Cont current plan of care

## 2024-01-03 NOTE — PLAN OF CARE
Goal Outcome Evaluation:  Problem: Adult Inpatient Plan of Care  Goal: Optimal Comfort and Wellbeing  Intervention: Provide Person-Centered Care  Recent Flowsheet Documentation  Taken 1/3/2024 1019 by Shelly Qureshi RN  Trust Relationship/Rapport:   care explained   choices provided   Patient  alert, oriented with minimal forgetfulness. Reoriented and explains cares to pt prior to implementation. Will continue to reorient and educate pt as needed.

## 2024-01-03 NOTE — PLAN OF CARE
Goal Outcome Evaluation:       Patient alert and oriented x 4. Denies pain. o2 sat 96-97% of  on NC with humidifier. Ate <25% of her dinner.  Consumed 240 ml of fluids. Incontinent of bowel and bladder. Using bed side commode  x 2. Bilateral lower legs are edematous and red. pt lying in bed and legs elevated with pillows. Right foot dressing clean and intact. Small blister is intact.Vitals are stable. All cares met.  Problem: Pain Acute  Goal: Optimal Pain Control and Function  Intervention: Prevent or Manage Pain  Recent Flowsheet Documentation  Taken 1/2/2024 1700 by Nicolasa Courtney RN  Sensory Stimulation Regulation: television on  Bowel Elimination Promotion: ambulation promoted  Medication Review/Management: medications reviewed  Intervention: Optimize Psychosocial Wellbeing  Recent Flowsheet Documentation  Taken 1/2/2024 1700 by Nicolasa Courtney RN  Supportive Measures: active listening utilized     Problem: Mechanical Ventilation Invasive  Goal: Optimal Device Function  Intervention: Optimize Device Care and Function  Recent Flowsheet Documentation  Taken 1/2/2024 1700 by Nicolasa Courtney RN  Airway Safety Measures: all equipment/monitors on and audible     Problem: Dysrhythmia  Goal: Normalized Cardiac Rhythm  Intervention: Monitor and Manage Cardiac Rhythm Effect  Recent Flowsheet Documentation  Taken 1/2/2024 1700 by Nicolasa Courtney RN  VTE Prevention/Management: compression stockings off  Stabilization Measures: legs elevated     Problem: Skin Injury Risk Increased  Goal: Skin Health and Integrity  Intervention: Optimize Skin Protection  Recent Flowsheet Documentation  Taken 1/2/2024 1700 by Nicolasa Courtney RN  Activity Management: activity adjusted per tolerance     Problem: Mechanical Ventilation Invasive  Goal: Mechanical Ventilation Liberation  Intervention: Promote Extubation and Mechanical Ventilation Liberation  Recent Flowsheet Documentation  Taken 1/2/2024 1700 by Nicolasa Courtney  RN  Medication Review/Management: medications reviewed  Environmental Support: calm environment promoted

## 2024-01-03 NOTE — PROGRESS NOTES
[facility]    Medicine Progress Note - Hospitalist Service    Date of Admission:  12/15/2023    Brief summary:  Priya Funez is a 83 year old female with a PMHx of HFpEF, severe TR, permanent AF s/p AVN ablation with PPM implant 1/11/19 s/p extraction TV (transvenous) PPM and implant leadless PPM 7/31/23, emergent aortic dissection repair 1/4/19, HTN, CKD, NEDA, and obesity who presented to [facility] as an outpatient for elective TVR done on 11/28/2023.  Immediate postoperative course was complicated by acute hypoxic and hypercapnic respiratory failure requiring BiPAP and probable pneumonia and treated with 7 days of IV Zosyn, patient was transferred to surgical telemetry floor on 12/2/2020.  On 12/6/2023 she was transferred back to CVICU after RRT for acute hypoxic respiratory distress and subsequent respiratory arrest with short CPR, re-intubation, and ROSC achieved. She sustained mildly displaced and nondisplaced right 3-5 rib fractures with associated right chest wall hematoma and nondisplaced anterolateral left 4th and 5th rib fractures. She was treated with meropenem 7 day course. She also has hx of recent C diff and multiple loose stools, for which she completed a 7 day po vancomycin course (per antimicrobial stewardship team, more likely to be colonization with recent history and no apparent treatment). Patient was transiently hyperglycemic and treated with insulin infusion then transitioned to sliding scale insulin per protocol. Blood sugars remained stable, has not required insulin coverage.  She remained intubated in the CVICU until undergoing tracheostomy by Dr. Pitts on 12/13/23, doing well with trach dome and PS currently.  She was treated for volume overload with diuretics (Bumex and chlorothiazide) with subsequent hypernatremia and hypotension requiring FWF and pressor support.  Spironolactone, torsemide and metoprolol discontinued and midodrine started with now stable blood pressures. Pre-op weight 70.9 kg,  discharge weight 74.1 kg (163 lbs) on 12/15/2023.  Patient was discharged to LTACH 12/15/2023 for continuing vent weaning, therapies      LTACH course:  12/17: Vitals stable.  Scheduled midodrine discontinued with BP stable in the 140s.  Diuretic therapy with torsemide restarted given peripheral edema and volume status, however at lower dose of 10 mg daily (home dose 40 mg daily) titrate slowly based on BP response.  Resume metoprolol for A-fib rate control as well gradually.  Labs reviewed, stable electrolytes, creatinine 0.82. ProBNP 3710, down from 55949 and 02292.  Hgb 8.0 from 7.6.  INR 0.98 from 1.12.  Continue heparin and Coumadin together for now until INR therapeutic.  Started hydroxyzine for anxiety 12/17.  A.m. labs ordered.       12/18-12/24:  increased torsemide to 20 mg po daily due to peripheral edema on 12/18, having loose stools,  changed tube feeds to higher fiber formula and add imodium daily and prn.  Still with edema,  increase toresemide to 40 mg po daily on 12/19 and increased imodium to bid and prn, changed heparin to lovenox for pt comfort.  She is getting prn hydroxyzine for anxiety which we should try to titrate off as anticoholinergics are not optimal in the geriatric population. 12/22 switched to bumex 1 mg daily and increased to bumex 1 mg BID 12/23.  Encouraged leg elevation above heart. Give hydrochlorothiazide one dose 12/24 - if that doesn't help then would need to try IV diuretics.  INR above 2 for the first time on 12/24 so if above 2 on 12/25 then could stop the lovenox.    12/25 - 12/29.  Had extensive lower extremity edema.  Switched Bumex to IV and then increased it from Bumex 1 mg twice daily to Bumex 1.5 mg twice daily.  Has been diuresing well.  Developed dermatitis on the lower extremities.  Started hydrocortisone cream.  12/27, VBG revealed hypercapnia she was put back on the mechanical ventilator with improvement.  12/29. Continues to diurese well. Plan to monitor kidney  function with creatinine.  She remains on vent weaning phase 2.  Making some progress.    12/30:  lower extremities are still swollen -increased bumex to 2 mg IV bid, more erythema, will monitor - may need to treat for cellulitis.    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  Increased urinary output, decreased swelling of LE.    1/1:  spoke with patient and she would like to be DNR.  She will go through with the swallow test tomorrow but has decided she no longer will have NJ tube in and will eat no matter what the test results are.  She agrees to continue with NJ tube tube until tomorrow.  She reports that her legs feel better.    1/2:  NJ discontinued, passed swallow test, diet changed to bite sized food and thin liquids  1/3:  walking around nursing station with PT and walker and 2L of O2 with trach capped, doing well, in much better spirits today.  Reports stool is getting more thick and wants to be off scheduled imodium, it was discontinued, she still has prn ordered.      Assessment & Plan   Acute respiratory failure and arrest on 12/6/23   Acute hypercapnic and hypoxic respiratory failure requiring BiPAP  s/p tracheostomy 12/13/23  s/p Mechanical ventilation  Aspiration pneumonitis vs pneumonia  Possible aspiration event 12/1  Near complete plugging of left lower lobe on CT   Acute mildly displaced and nondisplaced right 3-5 rib fractures with associated right chest wall hematoma and nondisplaced anterolateral left fourth and fifth rib fractures  - weaning per pulmonology  -RT for tracheostomy cares, oxygenation, nebs, pulmonary therapies  - Tracheostomy sutures removed (12/18/2023)  - Mucomyst, duoneb w/ RT   - SLP speaking valve as tolerated      S/p respiratory arrest 12/6  Hx of severe TR s/p TVR  HFpEF  Hx of Afib s/p AVN ablation - PPM, upgraded to leadless 7/2023  Hypertension  Hyperlipidemia  Hx of aortic  dissection repair 2019  Respiratory arrest 12/6   - Stable paced rhythm with underlying atrial flutter. On review of cardiology, underlying aflutter/afib has been present since at least June of this year. Device RN adjusted PPM to PTA settings rate 60 at [facility].   - Pre-op 9/8/23 echo: LVEF 55/60%, mildly dilated RV, normal function  - Echo 12/4 with LVEF 60-65%, normal RV function, TV mean gradient 5 mmHg, no TR  - Echo 12/7 with no significant change from previous with the exception of mild reduction of RV. RV free wall dyskinesia.   - ASA 81 mg daily  -was on scheduled midodrine 10 mg Q8H but stopped that 12/16/23 since BP in the 140s systolic and stable  -Continue midodrine 5 mg TID PRN for SBP<100   - Started Warfarin 12/15.    -Discontinued lovenox 12/26/2023. Continue with coumadin INR on goal.Pharmacy consulted for Coumadin dosing (INR goal 2-3)      Acute on chronic diastolic heart failure with Elevated BNP, improving   - Held PTA spironolactone, torsemide, metoprolol due to hypotension at prior hospital  - Echo 12/4 with LVEF 60-65%, normal RV function, TV mean gradient 5 mmHg, no TR  - Echo 12/7 with no significant change from previous with the exception of mild reduction of RV. RV free wall dyskinesia.   - FLUID STATUS: Pre-op weight 70.9 kg, discharge weight 74.1 kg (163 lbs) on 12/15/2023 and 165 lbs on 12/16  -increased torsemide to 40 mg po daily on 12/19 then switched to bumex 12/22 and increased to bumex 1 mg BID on 12/23- increased to 1.5, now increased bumex to 2 mg IV bid on 12/30  -proBNP trend appears improved from a peak of 14434 to now 3710.  -has a new blister on her right foot likely due to fluid retention      blister on top of Right foot  -  likely due to fluid retention  -blister popped on 12/30  -monitor for cellulitis    Multiple loose stools   Hx of C. Difficile (9/2023)  Severe malnutrition in the context of acute illness  - s/p tube feeds   -passed swallow on 1/2, diet changed from  tube feeds to bite sized and thin liquids.    - PPI famotidine  - Bowel regimen discontinued due to continued loose stools  - Finished vancomycin po 7 day course   - PRN Zofran  4 mg PO or IV Q6H  - PRN Compazine 5 mg IV Q6H   - loperamide 2 mg BID and prn     Hypernatremia   Hypokalemia  CKD stage 3  Lactic acidosis, resolved  IMAN on CKD, resolved   Edema  Baseline creat ~1.2-1.3   FLUID STATUS: Pre-op weight 70.9 kg, discharge weight 74.1 kg (163 lbs).   - Elevated sodium, Na 151 --> 147 on recheck, likely 2/2 aggressive diureses S/p bumex and chlorotiazide prior to transfer  -On free water flushes 30ml q4h  - I/O has been inaccurate due to unmeasured stools and voids.  - Replete lytes per protocol  - Avoid/limit nephrotoxins as able  - Strict I/O, daily weights, avoid/limit nephrotoxins     Stress induced hyperglycemia  T2DM  Hgb A1c 5.7%, stable off meds  - PTA Jardiance on hold, resume once medically optimized   - Goal BG <180 for optimal healing     Stress induced leukocytosis  Probable aspiration pneumonitis vs pneumonia, resolved  - 11/30 Zosyn 7-day course for pneumonia (finished)   - 12/6 Given further decompensation, Vanco and meropenem was started on 12/6   - Finished meropenem 7 day course (12/12)   - Finished Vancomycin PO 7 day course (for c-diff) (12/13)  - Blood culture with NGTD   - Urine with Candida and sputum with Yeast, likely both colonizations.   - Urine culture with NGTD     Hx of recent C. Difficile  - C. Difficile toxin PCR positive 12/3 with negative antigen and negative toxin - per antimicrobial stewardship team, more likely to be colonization with recent history and no apparent treatment  - Finished oral vancomycin course on 12/13, still with diarrhea but no abd pain, leucocytosis, or fevers so monitoring for now  - Monitor fever curve, WBC, and inflammatory markers as appropriate  - Enteric precautions per infection prevention protocol      3rd, 4th right rib fractures due to  "CPR  -Continue mini thoracotomy precautions  -Judicious use of narcotics, de-escalate as able     Acute blood loss anemia  - Hgb has been stable.   -Hgb 7.6 on 12/16 and 8.0 on 12/17/2023  -Iron studies: Serum iron 29, iron binding capacity 216, saturation index 13%.  -Follow CBC with weekly labs.   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   -f/up outpt     Diet: Soft & Bite Sized Diet (level 6) Thin Liquids (level 0)    DVT Prophylaxis: Enoxaparin (Lovenox) subcutaneous transitioning to warfarin   Barrientos Catheter: Not present  Lines: PRESENT      PICC 12/07/23 Triple Lumen Right Basilic ok to use PICC-Site Assessment: WDL      Cardiac Monitoring: None  Code Status: No CPR- Do NOT Intubate      Clinically Significant Risk Factors              # Hypoalbuminemia: Lowest albumin = 2.7 g/dL at 12/18/2023  5:17 AM, will monitor as appropriate     # Hypertension: Noted on problem list    # Acute heart failure with preserved ejection fraction: heart failure noted on problem list, last echo with EF >50%, and receiving IV diuretics       # Overweight: Estimated body mass index is 28.42 kg/m  (pended) as calculated from the following:    Height as of this encounter: 1.613 m (5' 3.5\").    Weight as of this encounter: (P) 73.9 kg (163 lb).   # Severe Malnutrition: based on nutrition assessment      # Financial/Environmental Concerns: none   # Pacemaker present  # History of CABG: noted on surgical history       Disposition Plan     Expected Discharge Date: 01/12/2024      Destination: home with family;foster/protective services            Clarissa Ndiaye MD  Hospitalist " Service  LTACH  Securely message with [employer] (more info)  Text page via [employer] Paging/Directory   ______________________________________________________________________    Interval History    walking around nursing station with PT and walker and 2L of O2 with trach capped, doing well, in much better spirits today.  Reports stool is getting more thick and wants to be off scheduled imodium, it was discontinued, she still has prn ordered.    No chest pain, dyspnea with exertion (same as yesterday)    Physical Exam   Vital Signs: Temp: (P) 97.6  F (36.4  C) Temp src: (P) Oral BP: (P) 136/65 Pulse: (P) 68   Resp: (P) 22 SpO2: 95 % O2 Device: Nasal cannula with humidification Oxygen Delivery: 3 LPM  Weight: 166 lbs .1 oz    GENERAL: no respiratory distress  HEENT: Head is normocephalic atraumatic eyes pupils appear equal round and reactive to light  NECK: Supple, Tracheostomy in place  LUNGS: clear to auscultation, decreased breath sounds at bases   HEART: S1 S2, Rate and rhythm is regular. No murmurs, 2+ BLE edema  ABDOMEN: Soft, nontender, no distension. Bowel sounds are positive. No guarding or rebound.  :  stool coming out of vagina    EXTREMITIES: 2+ bilateral lower extremity edema noted   SKIN: popped blister R foot, +erythema of b/l lower extremities   PSYCHIATRIC: Normal affect and cognition     Medical Decision Making       68 MINUTES SPENT BY ME on the date of service doing chart review, history, exam, documentation & further activities per the note.      Data   Lab Results   Component Value Date    WBC 6.1 12/28/2023    WBC 5.3 06/14/2021     Lab Results   Component Value Date    RBC 2.64 12/28/2023    RBC 4.33 06/14/2021     Lab Results   Component Value Date    HGB 7.8 12/28/2023    HGB 12.8 06/14/2021     Lab Results   Component Value Date    HCT 25.9 12/28/2023    HCT 40.1 06/14/2021     Lab Results   Component Value Date    MCV 98 12/28/2023    MCV 93 06/14/2021     Lab Results   Component Value Date     MCH 29.5 12/28/2023    MCH 29.6 06/14/2021     Lab Results   Component Value Date    MCHC 30.1 12/28/2023    MCHC 31.9 06/14/2021     Lab Results   Component Value Date    RDW 17.9 12/28/2023    RDW 13.5 06/14/2021     Lab Results   Component Value Date     12/28/2023     06/14/2021       Last Comprehensive Metabolic Panel:  Sodium   Date Value Ref Range Status   01/01/2024 145 135 - 145 mmol/L Final     Comment:     Reference intervals for this test were updated on 09/26/2023 to more accurately reflect our healthy population. There may be differences in the flagging of prior results with similar values performed with this method. Interpretation of those prior results can be made in the context of the updated reference intervals.    06/23/2021 138 133 - 144 mmol/L Final     Potassium   Date Value Ref Range Status   01/02/2024 3.9 3.4 - 5.3 mmol/L Final   12/13/2022 3.8 3.4 - 5.3 mmol/L Final   06/23/2021 4.5 3.4 - 5.3 mmol/L Final     Potassium POCT   Date Value Ref Range Status   12/27/2023 5.3 (H) 3.5 - 5.0 mmol/L Final     Chloride   Date Value Ref Range Status   01/01/2024 100 98 - 107 mmol/L Final   12/13/2022 99 94 - 109 mmol/L Final   06/23/2021 104 94 - 109 mmol/L Final     Carbon Dioxide   Date Value Ref Range Status   06/23/2021 31 20 - 32 mmol/L Final     Carbon Dioxide (CO2)   Date Value Ref Range Status   01/01/2024 40 (H) 22 - 29 mmol/L Final   12/13/2022 33 (H) 20 - 32 mmol/L Final     Anion Gap   Date Value Ref Range Status   01/01/2024 5 (L) 7 - 15 mmol/L Final   12/13/2022 7 3 - 14 mmol/L Final   06/23/2021 3 3 - 14 mmol/L Final     Glucose   Date Value Ref Range Status   01/01/2024 108 (H) 70 - 99 mg/dL Final   12/13/2022 84 70 - 99 mg/dL Final   06/23/2021 86 70 - 99 mg/dL Final     GLUCOSE BY METER POCT   Date Value Ref Range Status   12/30/2023 101 (H) 70 - 99 mg/dL Final     Urea Nitrogen   Date Value Ref Range Status   01/01/2024 36.9 (H) 8.0 - 23.0 mg/dL Final   12/13/2022 31  (H) 7 - 30 mg/dL Final   06/23/2021 30 7 - 30 mg/dL Final     Creatinine   Date Value Ref Range Status   01/01/2024 0.95 0.51 - 0.95 mg/dL Final   06/23/2021 1.29 (H) 0.52 - 1.04 mg/dL Final     GFR Estimate   Date Value Ref Range Status   01/01/2024 60 (L) >60 mL/min/1.73m2 Final   06/23/2021 39 (L) >60 mL/min/[1.73_m2] Final     Comment:     Non  GFR Calc  Starting 12/18/2018, serum creatinine based estimated GFR (eGFR) will be   calculated using the Chronic Kidney Disease Epidemiology Collaboration   (CKD-EPI) equation.       Calcium   Date Value Ref Range Status   01/01/2024 8.9 8.8 - 10.2 mg/dL Final   06/23/2021 9.2 8.5 - 10.1 mg/dL Final     Bilirubin Total   Date Value Ref Range Status   01/01/2024 0.7 <=1.2 mg/dL Final   04/04/2019 1.0 0.2 - 1.3 mg/dL Final     Alkaline Phosphatase   Date Value Ref Range Status   01/01/2024 122 40 - 150 U/L Final     Comment:     Reference intervals for this test were updated on 11/14/2023 to more accurately reflect our healthy population. There may be differences in the flagging of prior results with similar values performed with this method. Interpretation of those prior results can be made in the context of the updated reference intervals.   04/04/2019 104 40 - 150 U/L Final     ALT   Date Value Ref Range Status   01/01/2024 8 0 - 50 U/L Final     Comment:     Reference intervals for this test were updated on 6/12/2023 to more accurately reflect our healthy population. There may be differences in the flagging of prior results with similar values performed with this method. Interpretation of those prior results can be made in the context of the updated reference intervals.     06/14/2021 22 0 - 50 U/L Final     AST   Date Value Ref Range Status   01/01/2024 28 0 - 45 U/L Final     Comment:     Reference intervals for this test were updated on 6/12/2023 to more accurately reflect our healthy population. There may be differences in the flagging of prior  results with similar values performed with this method. Interpretation of those prior results can be made in the context of the updated reference intervals.   04/04/2019 27 0 - 45 U/L Final

## 2024-01-03 NOTE — PLAN OF CARE
Problem: Adult Inpatient Plan of Care  Goal: Optimal Comfort and Wellbeing  Outcome: Progressing  Intervention: Provide Person-Centered Care  Recent Flowsheet Documentation  Taken 1/3/2024 0036 by Nyla Aquino RN  Trust Relationship/Rapport: care explained     Problem: Anxiety Signs/Symptoms  Goal: Optimized Energy Level (Anxiety Signs/Symptoms)  Outcome: Progressing     Problem: Diarrhea  Goal: Effective Diarrhea Management  Outcome: Progressing  Intervention: Manage Diarrhea  Recent Flowsheet Documentation  Taken 1/3/2024 0036 by Nyla Aquino RN  Isolation Precautions: enteric precautions maintained  Medication Review/Management: medications reviewed     Problem: Pain Acute  Goal: Optimal Pain Control and Function  Intervention: Prevent or Manage Pain  Recent Flowsheet Documentation  Taken 1/3/2024 0036 by Nyla Aquino RN  Bowel Elimination Promotion: commode/bedpan at bedside  Medication Review/Management: medications reviewed     Problem: Anxiety Signs/Symptoms  Goal: Enhanced Social, Occupational or Functional Skills (Anxiety Signs/Symptoms)  Intervention: Promote Social, Occupational and Functional Ability  Recent Flowsheet Documentation  Taken 1/3/2024 0036 by Nyla Aquino RN  Trust Relationship/Rapport: care explained   Goal Outcome Evaluation:  Patient denied pain; had x 3 continent small BM, loose stool.  Patient received with trach capped, shifted to trach dome at 0030.  No PRNs given this shift; patient calm and cooperative with cares.

## 2024-01-03 NOTE — PROGRESS NOTES
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 Admitted for elective TVR completed 11/28/2023. Subsequent respiratory arrest on 12/6. Completed multiple courses of antibiotics for presumed pneumonia  S/p trach 12/13.     Transferred to LTAC 12/15. Progressed to continuous trach dome 12/20-26. Attempted 24/7 capping 12/26 with worsening hypercapnia 12/27 placed back on vent.  Suspect d/t acute on chronic volume overload, diuretics increased. Progressed to capping again(during day) and stayed off the vent overnight on TM/PMV.   Assessment/Plan:     Problems:  Acute hypercapnic/hypoxic respiratory failure s/p trach  Tracheostomy in place: 6 Bivona 12/22  Dysphagia s/p NJ: passed BDT. Passed VFSS  Respiratory/cardiac arrest 12/6 possibly d/t mucus plugging  S/p TVR, HFpEF(55-60%), b/l leg edema  Anticoagulation with coumadin for A-fib and bioprosthetic heart valve  Anxiety    Recommendations:  Phase 3: capping day, TM/PMV night  Check blood gas tomorrow morning   Continue diuresis per primary. Monitor CO2 and if continues to uptrend, will consider augmenting with diamox   Lymphedema wraps if patient agrees   Monitor for s/s aspiration now that on PO diet   Routine trach care per protocol    Keep progressing towards end goal of decannulation    Subjective:   - feels good today.  No respiratory complaints. PO intake going well thus far.  Agrees to leg wraps for edema.     Tracheal secretions: scant amounts     Cough strength: strong    Ventilator weaning results  - continuous TM 12/20-26  - 12/26: continuous capping   - 12/27: VBG am 7.31/76, placed back on vent.  Capped later ~2hr, dyspnea, improved on TM/PMV.  AC night   - 12/28: Capped 3L for 20 min; sob.  40L/35%TM/PMV for 13.5hr.   "AC night   -12/29-1/1: TM/PMV day(13-14hr on avg).  Vent night.    -1/2: Cap day, TM/PMV night     Clinical status discussed today with respiratory therapist       Medications:      dextrose      - MEDICATION INSTRUCTIONS -        aspirin  81 mg Oral Daily    bumetanide  2 mg Intravenous Q12H    escitalopram  10 mg Oral Daily    loperamide  2 mg Oral BID 09 12    melatonin  5 mg Oral QPM    miconazole   Topical BID    mirtazapine  15 mg Oral At Bedtime    multivitamin w/minerals  1 tablet Oral Daily    pantoprazole  40 mg Oral QAM AC    potassium chloride  20 mEq Oral BID    saccharomyces boulardii  250 mg Oral BID    sodium chloride (PF)  10 mL Intracatheter Q8H    Warfarin Therapy Reminder  1 each Oral See Admin Instructions         Exam/Data:   Vitals  /65 (BP Location: Left arm, Patient Position: Sitting)   Pulse 68   Temp 97.6  F (36.4  C) (Oral)   Resp 22   Ht 1.613 m (5' 3.5\")   Wt 73.9 kg (163 lb)   SpO2 95%   BMI 28.42 kg/m       I/O last 3 completed shifts:  In: 266 [P.O.:236; I.V.:30]  Out: 550 [Urine:550]  Weight change: 0.819 kg (1 lb 12.9 oz)    Vent Mode: Other (see comments) (capped)  FiO2 (%): 26 %  Resp Rate (Set): 14 breaths/min  Tidal Volume (Set, mL): 400 mL  PEEP (cm H2O): 5 cmH2O  Resp: 22    Capped on 3L NC currently     EXAM:  Gen: NAD in chair with trach capped   HEENT: trach midline/intact, no stridor   CV: RRR, no m/g/r  Resp: CTAB; non-labored, no wheeze  Abd: soft, nontender  Skin: b/l lower extremity rash(improved), large bulla on right dorsum(wrapped)  Ext: 3+ b/l lower extremity edema  Neuro: alert, follows commands     Labs:  Venous Blood Gas  Recent Labs   Lab 12/28/23  0724   PHV 7.43   PCO2V 57*   PO2V 31   HCO3V 38*   SHAWN 13.6       Complete Blood Count   Recent Labs   Lab 01/01/24  0615 12/31/23  0506 12/28/23  0604   WBC 6.8 5.8 6.1   HGB 7.7* 7.4* 7.8*    275 212     Basic Metabolic Panel  Recent Labs   Lab 01/02/24  0550 01/01/24  0615 12/31/23  0506 " 12/30/23  0623 12/30/23  0620 12/30/23  0439 12/29/23  0608   NA  --  145 145 145  --   --  144   POTASSIUM 3.9 3.9 3.2* 3.5  --   --  4.0   CHLORIDE  --  100 100 99  --   --  99   CO2  --  40* 38* 39*  --   --  38*   BUN  --  36.9* 37.2* 39.7*  --   --  43.1*   CR  --  0.95 0.96* 1.02*  --   --  1.13*   GLC  --  108* 135* 98 101*   < > 124*    < > = values in this interval not displayed.     Liver Function Tests  Recent Labs   Lab 01/03/24  0641 01/02/24  0550 01/01/24  0615 12/31/23  0506 12/29/23  0608 12/28/23  0604   AST  --   --  28 20  --  20   ALT  --   --  8 9  --  12   ALKPHOS  --   --  122 118  --  140   BILITOTAL  --   --  0.7 0.6  --  0.7   ALBUMIN  --   --  3.1* 2.9*  --  3.1*   INR 1.94* 1.74* 1.69* 2.39*   < > 3.45*    < > = values in this interval not displayed.     Coagulation Profile  Recent Labs   Lab 01/03/24  0641 01/02/24  0550 01/01/24  0615 12/31/23  0506   INR 1.94* 1.74* 1.69* 2.39*       Radiology: Personally reviewed; radiology read below    XR CHEST 12/27/2023  Tracheostomy. Sternotomy and valve replacement. Enteric tube within the stomach. Right upper extremity PICC line tip in the distal SVC. Heart is mildly enlarged, unchanged. Elevation of the right hemidiaphragm, unchanged. Minimal atelectasis at the right lung base. Lungs otherwise appear clear    XR CHEST 12/19/2023  Poststernotomy changes with a prosthetic TVR and tracheostomy tube tube in place. Feeding tube can be seen coursing into the stomach. Right upper extremity PICC line catheter overlies the proximal right atrium. Elevation of the right hemidiaphragm, unchanged with small right effusion and right basilar and likely middle lobe opacities, presumed atelectasis. Left lung is clear. Heart is slightly enlarged. No signs of failure.    Matt Kennedy CNP  Pulmonary Medicine  [facility]  Pager [phone]  Office [phone]

## 2024-01-04 NOTE — PROGRESS NOTES
"NUTRITION BRIEF NOTE     Check PO intakes    Current Diet Orders:   Orders Placed This Encounter      Soft & Bite Sized Diet (level 6) Thin Liquids (level 0)    Updates:   - Weight previously stable, now down to 163# today from 166#  - NJT removed 1/2, patient states that her appetite is improving and she ate mashed potatoes yesterday. She has also tried diced pears one at a time and a few bites of chicken/turkey now. She asked about eating egg salad, I encouraged this and high protein foods. She states \"you don't want my pants to fit\" and thinks that her appetite will come back \"too good.\"  - tried Ensure Clear, she stated that it's not too bad and would like more, she prefers to divide into two smaller portions    Recommendations:   Ordered calorie counts x3 days  Apple Ensure Clear daily  Berry Magic Cup daily  Cherry Gelatein daily    Will continue to follow per protocol. Please page/consult as needed.     Angelita Smith RDN, LD  "

## 2024-01-04 NOTE — PLAN OF CARE
Problem: Adult Inpatient Plan of Care  Goal: Optimal Comfort and Wellbeing  Outcome: Progressing  Intervention: Provide Person-Centered Care  Recent Flowsheet Documentation  Taken 1/4/2024 6830 by Shelly Qureshi RN  Trust Relationship/Rapport:   care explained   choices provided   Goal Outcome Evaluation:  Patient alert, oriented time four, complains of difficulties in breathing. Respiratory therapist informed about pt difficulties breathing. Changed pt right leg blister wound dressing, blister ruptured, wound looks clean.

## 2024-01-04 NOTE — PLAN OF CARE
Problem: Artificial Airway  Goal: Effective Communication  Outcome: Progressing  Goal: Optimal Device Function  Outcome: Progressing  Intervention: Optimize Device Care and Function  Recent Flowsheet Documentation  Taken 1/3/2024 2026 by Rosita Sifuentes RT  Airway Safety Measures:   all equipment/monitors on and audible   manual resuscitator/mask/valve at bedside   oxygen flowmeter   suction at bedside   suction equipment   suction regulator  Goal: Absence of Device-Related Skin or Tissue Injury  Outcome: Progressing  RT PROGRESS NOTE     DATA:     CURRENT SETTINGS:             TRACH TYPE / SIZE: #6 Bivona (Placed 12/22)             MODE:  TM with PMV             FIO2:   35%, 40 L (increased Oxygen based on VBG done this morning)     ACTION:             THERAPIES:              SUCTION: Yes                         FREQUENCY: 1x                         AMOUNT:  Small                        CONSISTENCY: Thick                        COLOR:   Pale yellow             SPONTANEOUS COUGH EFFORT/STRENGTH OF EFFORT (not elicited by suctioning): good productive cough.                              WEANING PHASE:  2                        WEAN MODE:  Capped with 2 L NC                        WEAN TIME:  11 hours and 2 minutes                        END WEAN REASON: Per pt request, TM with PMV at [facility]     RESPONSE:             BS:  Clear/Diminished             VITAL SIGNS: Sat: %  HR: 60-61 RR: 17-22             EMOTIONAL NEEDS / CONCERNS: None                RISK FOR SELF DECANNULATION: None                        RISK DUE TO:                          INTERVENTION/S IN PLACE IS/ARE: N/A       NOTE / PLAN:      Pt currently remains on TM with PMV and tolerating well.  Resume capping this morning.

## 2024-01-04 NOTE — PLAN OF CARE
"  Problem: Artificial Airway  Goal: Effective Communication  Outcome: Progressing  Goal: Optimal Device Function  Outcome: Progressing  Goal: Absence of Device-Related Skin or Tissue Injury  Outcome: Progressing    RT PROGRESS NOTE     DATA:     CURRENT SETTINGS:             TRACH TYPE / SIZE: #6 Bivona (Placed 12/22)             MODE:  TM with PMV             FIO2:   35%, 40 L (increased Oxygen based on VBG done this morning)     ACTION:             THERAPIES:              SUCTION: Yes                         FREQUENCY: 2                        AMOUNT:  Small                        CONSISTENCY: Thick                        COLOR:   white/yellow             SPONTANEOUS COUGH EFFORT/STRENGTH OF EFFORT (not elicited by suctioning): good productive cough.                              WEANING PHASE:  2                        WEAN MODE:  Capped with 2 L NC                        WEAN TIME:  first wean on CAP/2L-NC for 1 hrs. Second wean: started @ 0934 AM.                         END WEAN REASON: firs wean terminated due to increased WOB. Second wean; is ongoing --    RESPONSE:             BS:  Clear/Diminished             VITAL SIGNS: Blood pressure 130/62, pulse 64, temperature 98.3  F (36.8  C), temperature source Oral, resp. rate 18, height 1.613 m (5' 3.5\"), weight 73.9 kg (163 lb), SpO2 96%, not currently breastfeeding.            EMOTIONAL NEEDS / CONCERNS: None                RISK FOR SELF DECANNULATION: None                        RISK DUE TO:                          INTERVENTION/S IN PLACE IS/ARE: N/A       NOTE / PLAN:  Patient has been weaning on Trach Cap/2L-NC since this morning @ 0934 AM. Patient done Flutter valve and IS ( 500 ml) machine and did poor. Need more encouragements. Patient weaned this morning on Trach cap/2L-NC and switched back to TM/PMV due to increased WOB. Patient has strong congested coughs. Plan: continue Trach CAP as marbella- and closely monitor.                       "

## 2024-01-04 NOTE — PLAN OF CARE
Problem: Adult Inpatient Plan of Care  Goal: Plan of Care Review  Description: The Plan of Care Review/Shift note should be completed every shift.  The Outcome Evaluation is a brief statement about your assessment that the patient is improving, declining, or no change.  This information will be displayed automatically on your shift  note.  Outcome: Progressing  Flowsheets (Taken 1/3/2024 2340)  Plan of Care Reviewed With: (2 sons were here visiting this afternoon.)   patient   child  Overall Patient Progress: improving  Goal: Optimal Comfort and Wellbeing  Outcome: Progressing  Intervention: Provide Person-Centered Care  Recent Flowsheet Documentation  Taken 1/3/2024 1649 by Sandy Sauer RN  Trust Relationship/Rapport:   care explained   choices provided     Problem: Artificial Airway  Goal: Effective Communication  Outcome: Progressing  Intervention: Ensure Effective Communication  Recent Flowsheet Documentation  Taken 1/3/2024 1649 by Sandy Sauer RN  Communication Enhancement Strategies: call light answered in person  Family/Support System Care:   involvement promoted   support provided  Trust Relationship/Rapport:   care explained   choices provided  Goal: Optimal Device Function  Outcome: Progressing  Intervention: Optimize Device Care and Function  Recent Flowsheet Documentation  Taken 1/3/2024 1649 by Sandy Sauer RN  Airway Safety Measures: all equipment/monitors on and audible  Aspiration Precautions: NPO pending swallow screening/evaluation  Airway/Ventilation Management: airway patency maintained     Problem: Diarrhea  Goal: Effective Diarrhea Management  Outcome: Progressing  Intervention: Manage Diarrhea  Recent Flowsheet Documentation  Taken 1/3/2024 1649 by Sandy Sauer RN  Fluid/Electrolyte Management: fluids provided  Isolation Precautions: contact precautions maintained  Medication Review/Management: medications reviewed   Goal Outcome Evaluation:      Plan  of Care Reviewed With: patient, child (2 sons were here visiting this afternoon.)    Overall Patient Progress: improvingOverall Patient Progress: improving     Patient was alert and  oriented X 4. Able to express her needs / concerns and used call light appropriately. Her vital signs were stable  with trache capped ( O2 support per Nasal cannula with humidification at 2 LPM). Patient  requested to go back to TM / PMV after supper at 28 - 30% FiO2; RT and Pulmonologist following. She was stand by assist to the bedside commode and had 3 continent BM's ranging in  consistency from  soft to formed. She ate 50% of supper and drank moderate  amounts of thin liquids. Bilateral L/E wraps taken off at bedtime. Dressing change to her right blistered foot  done Will keep monitoring patient's progress and safety.

## 2024-01-04 NOTE — PROGRESS NOTES
[facility]    Medicine Progress Note - Hospitalist Service    Date of Admission:  12/15/2023    Brief summary:  Priya Funez is a 83 year old female with a PMHx of HFpEF, severe TR, permanent AF s/p AVN ablation with PPM implant 1/11/19 s/p extraction TV (transvenous) PPM and implant leadless PPM 7/31/23, emergent aortic dissection repair 1/4/19, HTN, CKD, NEDA, and obesity who presented to [facility] as an outpatient for elective TVR done on 11/28/2023.  Immediate postoperative course was complicated by acute hypoxic and hypercapnic respiratory failure requiring BiPAP and probable pneumonia and treated with 7 days of IV Zosyn, patient was transferred to surgical telemetry floor on 12/2/2020.  On 12/6/2023 she was transferred back to CVICU after RRT for acute hypoxic respiratory distress and subsequent respiratory arrest with short CPR, re-intubation, and ROSC achieved. She sustained mildly displaced and nondisplaced right 3-5 rib fractures with associated right chest wall hematoma and nondisplaced anterolateral left 4th and 5th rib fractures. She was treated with meropenem 7 day course. She also has hx of recent C diff and multiple loose stools, for which she completed a 7 day po vancomycin course (per antimicrobial stewardship team, more likely to be colonization with recent history and no apparent treatment). Patient was transiently hyperglycemic and treated with insulin infusion then transitioned to sliding scale insulin per protocol. Blood sugars remained stable, has not required insulin coverage.  She remained intubated in the CVICU until undergoing tracheostomy by Dr. Pitts on 12/13/23, doing well with trach dome and PS currently.  She was treated for volume overload with diuretics (Bumex and chlorothiazide) with subsequent hypernatremia and hypotension requiring FWF and pressor support.  Spironolactone, torsemide and metoprolol discontinued and midodrine started with now stable blood pressures. Pre-op weight 70.9 kg,  discharge weight 74.1 kg (163 lbs) on 12/15/2023.  Patient was discharged to LTACH 12/15/2023 for continuing vent weaning, therapies      LTACH course:  12/17: Vitals stable.  Scheduled midodrine discontinued with BP stable in the 140s.  Diuretic therapy with torsemide restarted given peripheral edema and volume status, however at lower dose of 10 mg daily (home dose 40 mg daily) titrate slowly based on BP response.  Resume metoprolol for A-fib rate control as well gradually.  Labs reviewed, stable electrolytes, creatinine 0.82. ProBNP 3710, down from 09413 and 38830.  Hgb 8.0 from 7.6.  INR 0.98 from 1.12.  Continue heparin and Coumadin together for now until INR therapeutic.  Started hydroxyzine for anxiety 12/17.  A.m. labs ordered.       12/18-12/24:  increased torsemide to 20 mg po daily due to peripheral edema on 12/18, having loose stools,  changed tube feeds to higher fiber formula and add imodium daily and prn.  Still with edema,  increase toresemide to 40 mg po daily on 12/19 and increased imodium to bid and prn, changed heparin to lovenox for pt comfort.  She is getting prn hydroxyzine for anxiety which we should try to titrate off as anticoholinergics are not optimal in the geriatric population. 12/22 switched to bumex 1 mg daily and increased to bumex 1 mg BID 12/23.  Encouraged leg elevation above heart. Give hydrochlorothiazide one dose 12/24 - if that doesn't help then would need to try IV diuretics.  INR above 2 for the first time on 12/24 so if above 2 on 12/25 then could stop the lovenox.    12/25 - 12/29.  Had extensive lower extremity edema.  Switched Bumex to IV and then increased it from Bumex 1 mg twice daily to Bumex 1.5 mg twice daily.  Has been diuresing well.  Developed dermatitis on the lower extremities.  Started hydrocortisone cream.  12/27, VBG revealed hypercapnia she was put back on the mechanical ventilator with improvement.  12/29. Continues to diurese well. Plan to monitor kidney  function with creatinine.  She remains on vent weaning phase 2.  Making some progress.    12/30:  lower extremities are still swollen -increased bumex to 2 mg IV bid, more erythema, will monitor - may need to treat for cellulitis.    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  Increased urinary output, decreased swelling of LE.    1/1:  spoke with patient and she would like to be DNR.  She will go through with the swallow test tomorrow but has decided she no longer will have NJ tube in and will eat no matter what the test results are.  She agrees to continue with NJ tube tube until tomorrow.  She reports that her legs feel better.    1/2:  NJ discontinued, passed swallow test, diet changed to bite sized food and thin liquids  1/3:  walking around nursing station with PT and walker and 2L of O2 with trach capped, doing well, in much better spirits today.  Reports stool is getting more thick and wants to be off scheduled imodium, it was discontinued, she still has prn ordered.    1/4:  creatinine has increased, swelling is down, decrease bumex 1.5 mg IV bid, prn miralax     Assessment & Plan   Acute respiratory failure and arrest on 12/6/23   Acute hypercapnic and hypoxic respiratory failure requiring BiPAP  s/p tracheostomy 12/13/23  s/p Mechanical ventilation  Aspiration pneumonitis vs pneumonia  Possible aspiration event 12/1  Near complete plugging of left lower lobe on CT   Acute mildly displaced and nondisplaced right 3-5 rib fractures with associated right chest wall hematoma and nondisplaced anterolateral left fourth and fifth rib fractures  - weaning per pulmonology  -RT for tracheostomy cares, oxygenation, nebs, pulmonary therapies  - Tracheostomy sutures removed (12/18/2023)  - Mucomyst, duoneb w/ RT   - SLP speaking valve as tolerated      S/p respiratory arrest 12/6  Hx of severe TR s/p TVR  HFpEF  Hx of Afib  s/p AVN ablation - PPM, upgraded to leadless 7/2023  Hypertension  Hyperlipidemia  Hx of aortic dissection repair 2019  Respiratory arrest 12/6   - Stable paced rhythm with underlying atrial flutter. On review of cardiology, underlying aflutter/afib has been present since at least June of this year. Device RN adjusted PPM to PTA settings rate 60 at [facility].   - Pre-op 9/8/23 echo: LVEF 55/60%, mildly dilated RV, normal function  - Echo 12/4 with LVEF 60-65%, normal RV function, TV mean gradient 5 mmHg, no TR  - Echo 12/7 with no significant change from previous with the exception of mild reduction of RV. RV free wall dyskinesia.   - ASA 81 mg daily  -was on scheduled midodrine 10 mg Q8H but stopped that 12/16/23 since BP in the 140s systolic and stable  -Continue midodrine 5 mg TID PRN for SBP<100   - Started Warfarin 12/15.    -Discontinued lovenox 12/26/2023. Continue with coumadin INR on goal.Pharmacy consulted for Coumadin dosing (INR goal 2-3)      Acute on chronic diastolic heart failure with Elevated BNP, improving   - Held PTA spironolactone, torsemide, metoprolol due to hypotension at prior hospital  - Echo 12/4 with LVEF 60-65%, normal RV function, TV mean gradient 5 mmHg, no TR  - Echo 12/7 with no significant change from previous with the exception of mild reduction of RV. RV free wall dyskinesia.   - FLUID STATUS: Pre-op weight 70.9 kg, discharge weight 74.1 kg (163 lbs) on 12/15/2023 and 165 lbs on 12/16  -increased torsemide to 40 mg po daily on 12/19 then switched to bumex 12/22 and increased to bumex 1 mg BID on 12/23- increased to 1.5, now increased bumex to 2 mg IV bid on 12/30- decrease to 1.5 mg IV bid on 1/4   -proBNP trend appears improved from a peak of 10393 to now 3710.  -has a new blister on her right foot likely due to fluid retention      blister on top of Right foot  -  likely due to fluid retention  -blister popped on 12/30  -monitor for cellulitis    Multiple loose stools   Hx of C.  Difficile (9/2023)  Severe malnutrition in the context of acute illness  - s/p tube feeds   -passed swallow on 1/2, diet changed from tube feeds to bite sized and thin liquids.    - PPI famotidine  - Bowel regimen discontinued due to continued loose stools  - Finished vancomycin po 7 day course   - PRN Zofran  4 mg PO or IV Q6H  - PRN Compazine 5 mg IV Q6H   - loperamide 2 mg BID and prn     Hypernatremia   Hypokalemia  CKD stage 3  Lactic acidosis, resolved  IMAN on CKD, resolved   Edema  Baseline creat ~1.2-1.3   FLUID STATUS: Pre-op weight 70.9 kg, discharge weight 74.1 kg (163 lbs).   - Elevated sodium, Na 151 --> 147 on recheck, likely 2/2 aggressive diureses S/p bumex and chlorotiazide prior to transfer  -On free water flushes 30ml q4h  - I/O has been inaccurate due to unmeasured stools and voids.  - Replete lytes per protocol  - Avoid/limit nephrotoxins as able  - Strict I/O, daily weights, avoid/limit nephrotoxins     Stress induced hyperglycemia  T2DM  Hgb A1c 5.7%, stable off meds  - PTA Jardiance on hold, resume once medically optimized   - Goal BG <180 for optimal healing     Stress induced leukocytosis  Probable aspiration pneumonitis vs pneumonia, resolved  - 11/30 Zosyn 7-day course for pneumonia (finished)   - 12/6 Given further decompensation, Vanco and meropenem was started on 12/6   - Finished meropenem 7 day course (12/12)   - Finished Vancomycin PO 7 day course (for c-diff) (12/13)  - Blood culture with NGTD   - Urine with Candida and sputum with Yeast, likely both colonizations.   - Urine culture with NGTD     Hx of recent C. Difficile  - C. Difficile toxin PCR positive 12/3 with negative antigen and negative toxin - per antimicrobial stewardship team, more likely to be colonization with recent history and no apparent treatment  - Finished oral vancomycin course on 12/13, still with diarrhea but no abd pain, leucocytosis, or fevers so monitoring for now  - Monitor fever curve, WBC, and  "inflammatory markers as appropriate  - Enteric precautions per infection prevention protocol      3rd, 4th right rib fractures due to CPR  -Continue mini thoracotomy precautions  -Judicious use of narcotics, de-escalate as able     Acute blood loss anemia  - Hgb has been stable.   -Hgb 7.6 on 12/16 and 8.0 on 12/17/2023  -Iron studies: Serum iron 29, iron binding capacity 216, saturation index 13%.  -Follow CBC with weekly labs.   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   -f/up outpt     Diet: Soft & Bite Sized Diet (level 6) Thin Liquids (level 0)    DVT Prophylaxis: Enoxaparin (Lovenox) subcutaneous transitioning to warfarin   Barrientos Catheter: Not present  Lines: PRESENT      PICC 12/07/23 Triple Lumen Right Basilic ok to use PICC-Site Assessment: WDL      Cardiac Monitoring: None  Code Status: No CPR- Do NOT Intubate      Clinically Significant Risk Factors          # Hypocalcemia: Lowest iCa = 1.14 mg/dL in last 2 days, will monitor and replace as appropriate     # Hypoalbuminemia: Lowest albumin = 2.7 g/dL at 1/4/2024  5:50 AM, will monitor as appropriate     # Hypertension: Noted on problem list    # Acute heart failure with preserved ejection fraction: heart failure noted on problem list, last echo with EF >50%, and receiving IV diuretics       # Overweight: Estimated body mass index is 28.42 kg/m  as calculated from the following:    Height as of this encounter: 1.613 m (5' 3.5\").    Weight as of this encounter: 73.9 kg (163 lb).   # Severe Malnutrition: based on nutrition assessment      # Financial/Environmental Concerns: none   # Pacemaker present  # " History of CABG: noted on surgical history       Disposition Plan     Expected Discharge Date: 01/12/2024      Destination: home with family;foster/protective services            Clarissa Ndiaye MD  Hospitalist Service  LTACH  Securely message with [facility] (more info)  Text page via [facility] Paging/Directory   ______________________________________________________________________    Interval History   She is feeling weaker than yesterday, she reports that she is feeling more short of breath today.  She was not able to walk as well with PT today.  She is asking for prn miralax, which was ordered.      No chest pain, + dyspnea with exertion     Physical Exam   Vital Signs: Temp: 97.8  F (36.6  C) Temp src: Oral BP: 136/63 Pulse: 63   Resp: 22 SpO2: 95 % O2 Device: Nasal cannula with humidification Oxygen Delivery: 2 LPM  Weight: 163 lbs 0 oz    GENERAL: no respiratory distress  HEENT: Head is normocephalic atraumatic eyes pupils appear equal round and reactive to light  NECK: Supple, Tracheostomy in place  LUNGS: clear to auscultation, decreased breath sounds at bases   HEART: S1 S2, Rate and rhythm is regular. No murmurs, 2+ BLE edema  ABDOMEN: Soft, nontender, no distension. Bowel sounds are positive. No guarding or rebound.  :  stool coming out of vagina    EXTREMITIES: 2+ bilateral lower extremity edema noted   SKIN: popped blister R foot, +erythema of b/l lower extremities   PSYCHIATRIC: Normal affect and cognition     Medical Decision Making       58 MINUTES SPENT BY ME on the date of service doing chart review, history, exam, documentation & further activities per the note.      Data   Lab Results   Component Value Date    WBC 6.1 12/28/2023    WBC 5.3 06/14/2021     Lab Results   Component Value Date    RBC 2.64 12/28/2023    RBC 4.33 06/14/2021     Lab Results   Component Value Date    HGB 7.8 12/28/2023    HGB 12.8 06/14/2021     Lab Results   Component Value Date    HCT 25.9 12/28/2023    HCT 40.1  06/14/2021     Lab Results   Component Value Date    MCV 98 12/28/2023    MCV 93 06/14/2021     Lab Results   Component Value Date    MCH 29.5 12/28/2023    MCH 29.6 06/14/2021     Lab Results   Component Value Date    MCHC 30.1 12/28/2023    MCHC 31.9 06/14/2021     Lab Results   Component Value Date    RDW 17.9 12/28/2023    RDW 13.5 06/14/2021     Lab Results   Component Value Date     12/28/2023     06/14/2021       Last Comprehensive Metabolic Panel:  Sodium   Date Value Ref Range Status   01/04/2024 141 135 - 145 mmol/L Final     Comment:     Reference intervals for this test were updated on 09/26/2023 to more accurately reflect our healthy population. There may be differences in the flagging of prior results with similar values performed with this method. Interpretation of those prior results can be made in the context of the updated reference intervals.    06/23/2021 138 133 - 144 mmol/L Final     Sodium POCT   Date Value Ref Range Status   01/04/2024 141 135 - 145 mmol/L Final     Potassium   Date Value Ref Range Status   01/04/2024 3.9 3.4 - 5.3 mmol/L Final   12/13/2022 3.8 3.4 - 5.3 mmol/L Final   06/23/2021 4.5 3.4 - 5.3 mmol/L Final     Potassium POCT   Date Value Ref Range Status   01/04/2024 3.7 3.5 - 5.0 mmol/L Final     Chloride   Date Value Ref Range Status   01/04/2024 96 (L) 98 - 107 mmol/L Final   12/13/2022 99 94 - 109 mmol/L Final   06/23/2021 104 94 - 109 mmol/L Final     Carbon Dioxide   Date Value Ref Range Status   06/23/2021 31 20 - 32 mmol/L Final     Carbon Dioxide (CO2)   Date Value Ref Range Status   01/04/2024 39 (H) 22 - 29 mmol/L Final   12/13/2022 33 (H) 20 - 32 mmol/L Final     Anion Gap   Date Value Ref Range Status   01/04/2024 6 (L) 7 - 15 mmol/L Final   12/13/2022 7 3 - 14 mmol/L Final   06/23/2021 3 3 - 14 mmol/L Final     Glucose   Date Value Ref Range Status   01/04/2024 85 70 - 99 mg/dL Final   12/13/2022 84 70 - 99 mg/dL Final   06/23/2021 86 70 - 99 mg/dL  Final     GLUCOSE BY METER POCT   Date Value Ref Range Status   12/30/2023 101 (H) 70 - 99 mg/dL Final     Glucose Whole Blood POCT   Date Value Ref Range Status   01/04/2024 85 70 - 125 mg/dL Final     Urea Nitrogen   Date Value Ref Range Status   01/04/2024 30.2 (H) 8.0 - 23.0 mg/dL Final   12/13/2022 31 (H) 7 - 30 mg/dL Final   06/23/2021 30 7 - 30 mg/dL Final     Creatinine   Date Value Ref Range Status   01/04/2024 1.17 (H) 0.51 - 0.95 mg/dL Final   06/23/2021 1.29 (H) 0.52 - 1.04 mg/dL Final     GFR Estimate   Date Value Ref Range Status   01/04/2024 47 (L) >60 mL/min/1.73m2 Final   06/23/2021 39 (L) >60 mL/min/[1.73_m2] Final     Comment:     Non  GFR Calc  Starting 12/18/2018, serum creatinine based estimated GFR (eGFR) will be   calculated using the Chronic Kidney Disease Epidemiology Collaboration   (CKD-EPI) equation.       Calcium   Date Value Ref Range Status   01/04/2024 8.9 8.8 - 10.2 mg/dL Final   06/23/2021 9.2 8.5 - 10.1 mg/dL Final     Bilirubin Total   Date Value Ref Range Status   01/04/2024 0.7 <=1.2 mg/dL Final   04/04/2019 1.0 0.2 - 1.3 mg/dL Final     Alkaline Phosphatase   Date Value Ref Range Status   01/04/2024 102 40 - 150 U/L Final     Comment:     Reference intervals for this test were updated on 11/14/2023 to more accurately reflect our healthy population. There may be differences in the flagging of prior results with similar values performed with this method. Interpretation of those prior results can be made in the context of the updated reference intervals.   04/04/2019 104 40 - 150 U/L Final     ALT   Date Value Ref Range Status   01/04/2024 9 0 - 50 U/L Final     Comment:     Reference intervals for this test were updated on 6/12/2023 to more accurately reflect our healthy population. There may be differences in the flagging of prior results with similar values performed with this method. Interpretation of those prior results can be made in the context of the  updated reference intervals.     06/14/2021 22 0 - 50 U/L Final     AST   Date Value Ref Range Status   01/04/2024 22 0 - 45 U/L Final     Comment:     Reference intervals for this test were updated on 6/12/2023 to more accurately reflect our healthy population. There may be differences in the flagging of prior results with similar values performed with this method. Interpretation of those prior results can be made in the context of the updated reference intervals.   04/04/2019 27 0 - 45 U/L Final

## 2024-01-04 NOTE — PROGRESS NOTES
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 Admitted for elective TVR completed 11/28/2023. Subsequent respiratory arrest on 12/6. Completed multiple courses of antibiotics for presumed pneumonia  S/p trach 12/13. Transferred to LTAC 12/15. Progressed to continuous trach dome 12/20-26. Attempted 24/7 capping 12/26 with worsening hypercapnia 12/27 placed back on vent.  Suspect d/t acute on chronic volume overload, diuretics increased. Progressed to capping during day and TM/PMV at night since 1/2. VBG today 7.46/58.  Assessment/Plan:     Problems:  Acute hypercapnic/hypoxic respiratory failure s/p trach  Tracheostomy in place: 6 [place] 12/22  Dysphagia s/p NJ: passed BDT. Passed VFSS  Respiratory/cardiac arrest 12/6 possibly d/t mucus plugging  S/p TVR, HFpEF(55-60%)  IMAN, volume overload on diuretics   Anticoagulation with coumadin for A-fib and bioprosthetic heart valve  Anxiety    Recommendations:  Phase 3: cap as tolerated   Monitor pCO2 intermittently for worsening retention   Start IS and FV  Continue diuresis: monitor renal function(worse today). If continues to worsen, obtain renal consult and may need a diuretic holiday    Lymphedema wraps   Monitor for s/s aspiration now that on PO diet   Routine trach care per protocol    Keep progressing towards end goal of decannulation    Subjective:   - feels ok today.  Intermittent sob, sometimes when eating, sometimes at rest, often associated with anxiety.  O2 need slightly improved form yesterday on 2L NC with SpO2 99%.     Tracheal secretions: small - moderate amounts, 1-2x per shift     Cough strength: strong    Ventilator weaning results  - continuous TM 12/20-26  - 12/26: continuous capping   - 12/27: VBG am 7.31/76,  "placed back on vent.  Capped later ~2hr, dyspnea, improved on TM/PMV.  AC night   - 12/28: Capped 3L for 20 min; sob.  40L/35%TM/PMV for 13.5hr.  AC night   -12/29-1/1: TM/PMV day(13-14hr on avg).  Vent night.    -1/2-present: Cap day, TM/PMV night     Clinical status discussed today with respiratory therapist       Medications:      dextrose      - MEDICATION INSTRUCTIONS -        aspirin  81 mg Oral Daily    bumetanide  2 mg Intravenous Q12H    escitalopram  10 mg Oral Daily    melatonin  5 mg Oral QPM    miconazole   Topical BID    mirtazapine  15 mg Oral At Bedtime    multivitamin w/minerals  1 tablet Oral Daily    pantoprazole  40 mg Oral QAM AC    potassium chloride  20 mEq Oral BID    saccharomyces boulardii  250 mg Oral BID    sodium chloride (PF)  10 mL Intracatheter Q8H    warfarin ANTICOAGULANT  1 mg Oral ONCE at 18:00    Warfarin Therapy Reminder  1 each Oral See Admin Instructions         Exam/Data:   Vitals  /63 (BP Location: Left arm)   Pulse 61   Temp 97.8  F (36.6  C) (Oral)   Resp 22   Ht 1.613 m (5' 3.5\")   Wt 73.9 kg (163 lb)   SpO2 93%   BMI 28.42 kg/m       I/O last 3 completed shifts:  In: 960 [P.O.:960]  Out: -   Weight change: -1.364 kg (-3 lb 0.1 oz)    Vent Mode: Trach collar  FiO2 (%): 30 %  Resp: 22    Capped on 2L NC currently     EXAM:  Gen: NAD in chair with trach capped   HEENT: trach midline/intact, no stridor   CV: RRR, no m/g/r  Resp: CTAB; non-labored, no wheeze  Abd: soft, nontender  Skin: b/l lower extremity rash(improved), large bulla on right dorsum(wrapped)  Ext: 3+ b/l lower extremity edema  Neuro: alert, follows commands     Labs:  Venous Blood Gas  Recent Labs   Lab 01/04/24  0550   PHV 7.46*   PCO2V 58*   PO2V 28   HCO3V 38*   SHAWN 16.9       Complete Blood Count   Recent Labs   Lab 01/04/24  0550 01/01/24  0615 12/31/23  0506   WBC 8.1 6.8 5.8   HGB 7.5*  7.7* 7.7* 7.4*    335 275     Basic Metabolic Panel  Recent Labs   Lab 01/04/24  0550 " 01/02/24  0550 01/01/24  0615 12/31/23  0506 12/30/23  0623     141  --  145 145 145   POTASSIUM 3.7  3.9 3.9 3.9 3.2* 3.5   CHLORIDE 96*  --  100 100 99   CO2 39*  --  40* 38* 39*   BUN 30.2*  --  36.9* 37.2* 39.7*   CR 1.17*  --  0.95 0.96* 1.02*   GLC 85  85  --  108* 135* 98     Liver Function Tests  Recent Labs   Lab 01/04/24  0550 01/03/24  0641 01/02/24  0550 01/01/24  0615 12/31/23  0506   AST 22  --   --  28 20   ALT 9  --   --  8 9   ALKPHOS 102  --   --  122 118   BILITOTAL 0.7  --   --  0.7 0.6   ALBUMIN 2.7*  --   --  3.1* 2.9*   INR 3.95* 1.94* 1.74* 1.69* 2.39*     Coagulation Profile  Recent Labs   Lab 01/04/24  0550 01/03/24  0641 01/02/24  0550 01/01/24  0615   INR 3.95* 1.94* 1.74* 1.69*       Radiology: Personally reviewed; radiology read below    XR CHEST 12/27/2023  Tracheostomy. Sternotomy and valve replacement. Enteric tube within the stomach. Right upper extremity PICC line tip in the distal SVC. Heart is mildly enlarged, unchanged. Elevation of the right hemidiaphragm, unchanged. Minimal atelectasis at the right lung base. Lungs otherwise appear clear    XR CHEST 12/19/2023  Poststernotomy changes with a prosthetic TVR and tracheostomy tube tube in place. Feeding tube can be seen coursing into the stomach. Right upper extremity PICC line catheter overlies the proximal right atrium. Elevation of the right hemidiaphragm, unchanged with small right effusion and right basilar and likely middle lobe opacities, presumed atelectasis. Left lung is clear. Heart is slightly enlarged. No signs of failure.    Matt Kenneyd CNP  Pulmonary Medicine  [facility]  Pager [phone]  Office [phone]

## 2024-01-04 NOTE — PLAN OF CARE
Problem: Adult Inpatient Plan of Care  Goal: Plan of Care Review  Description: The Plan of Care Review/Shift note should be completed every shift.  The Outcome Evaluation is a brief statement about your assessment that the patient is improving, declining, or no change.  This information will be displayed automatically on your shift  note.  Outcome: Progressing   Goal Outcome Evaluation:         Patient was A/O X 4 ,complained of pain @ the trach site  and neck and requested for prn tylenol , that was given with good effect. Patient slept for 4- 5 hours on this shift, was up to the bedside commode x 2, all other patient needs were met.

## 2024-01-05 NOTE — PROGRESS NOTES
01/04/24 1105   Appointment Info   Signing Clinician's Name / Credentials (OT) Mary Ann Siegel, OTRL/BETH   Cognitive Screens/Assessments   Cognitive Assessments Completed ACE III   [place] s Cognitive Examination (ACE-III) Total Score (out of 100)  74/100   ACE III Norms A score of 82 or less indicates suspected cognitive impairment   ACE III Domains Assessed Cognitive Screen for Attention, Memory, Fluency, Language, Visuospatial Abilities   ACE III Interpretation Pt partic 1005 did well with attending to assess. Completed while in bed, HOB elevated. Sub category; attention 16/18, Memory 19/26, fluency 7/14, lanuage 19/26, visuospatial 13/16.   Interventions   Interventions Quick Adds Cognitive Retraining   Cognitive Retraining   Cognitive Skills Intervention 1st 15 Minutes Timed (37231) 15   Cognitive Skills Intervention Addl Minutes (45377) 10   Symptoms Noted During/After Treatment None   Treatment Detail/Skilled Intervention ACE III completed see above. Reviewed initially scoring w/ pt and areas of mild concern.   OT Discharge Planning   OT Plan 1/4: standing tolerance, TB dressing, commode transfers - toileting in bathroom (can switch over to protable O2), g/h at sink, BUE ex, issue HEP-- left red theraband in room   OT Discharge Recommendation (DC Rec) home with assist;home with home care occupational therapy   OT Rationale for DC Rec anticipate pt to progress during stay to allow for pt to d/c home with spouse's assist. spouse reports he can assist PRN for BADLs.   OT Brief overview of current status Pt completed ACE III, score slightly below the norms. Greatest diff w/ fluency and recall. Cont OT/POC   Total Session Time   Timed Code Treatment Minutes 25   Total Session Time (sum of timed and untimed services) 25

## 2024-01-05 NOTE — PROGRESS NOTES
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 Admitted for elective TVR completed 11/28/2023. Subsequent respiratory arrest on 12/6. Completed multiple courses of antibiotics for presumed pneumonia  S/p trach 12/13. Transferred to LTAC 12/15. Progressed to continuous trach dome 12/20-26. Attempted 24/7 capping 12/26 with worsening hypercapnia 12/27 placed back on vent.  Suspect d/t acute on chronic volume overload, diuretics increased. Now capping 24/7 since 1/3.  Assessment/Plan:     Problems:  Acute hypercapnic/hypoxic respiratory failure s/p trach  Tracheostomy in place: 6 [place] 12/22  Dysphagia: passed BDT. Passed VFSS on modified diet   Respiratory/cardiac arrest 12/6 possibly d/t mucus plugging  S/p TVR, HFpEF(55-60%)  IMAN, volume overload on diuretics   Anticoagulation with coumadin for A-fib and bioprosthetic heart valve  Anxiety    Recommendations:  Phase 3: cap as tolerated   Monitor pCO2 intermittently for worsening retention - check VBG tomorrow   Repeat CxR on Monday   IS(500mL) and FV  Bumex decreased to 1.5mg BID - monitor renal function, volume status closely   Lymphedema wraps   Monitor for s/s aspiration now that on PO diet   Routine trach care per protocol    Keep progressing towards end goal of decannulation    Subjective:   - feels pretty good today, breathing doing ok, no acute complaints.  Didn't sleep great last night     Tracheal secretions: scant to small amounts     Cough strength: strong    Ventilator weaning results  - continuous TM 12/20-26  - 12/26: continuous capping   - 12/27: VBG am 7.31/76, placed back on vent.  Capped later ~2hr, dyspnea, improved on TM/PMV.  AC night   - 12/28: Capped 3L for 20 min; sob.  40L/35%TM/PMV for 13.5hr.  AC night  "  -12/29-1/1: TM/PMV day(13-14hr on avg).  Vent night.    -1/2-3: Cap day, TM/PMV night   -1/4: capped continuously     Clinical status discussed today with respiratory therapist       Medications:      dextrose      - MEDICATION INSTRUCTIONS -        aspirin  81 mg Oral Daily    bumetanide  1.5 mg Intravenous Q12H    escitalopram  10 mg Oral Daily    melatonin  5 mg Oral QPM    miconazole   Topical BID    mirtazapine  15 mg Oral At Bedtime    multivitamin w/minerals  1 tablet Oral Daily    pantoprazole  40 mg Oral QAM AC    potassium chloride  20 mEq Oral BID    saccharomyces boulardii  250 mg Oral BID    sodium chloride (PF)  10 mL Intracatheter Q8H    Warfarin Therapy Reminder  1 each Oral See Admin Instructions         Exam/Data:   Vitals  BP (!) 143/64 (BP Location: Left arm)   Pulse 64   Temp 97.7  F (36.5  C) (Oral)   Resp 24   Ht 1.613 m (5' 3.5\")   Wt 73.8 kg (162 lb 11.2 oz)   SpO2 99%   BMI 28.37 kg/m       I/O last 3 completed shifts:  In: 976 [P.O.:976]  Out: -   Weight change:     Vent Mode: Other (see comments) (Cap NC)  FiO2 (%): 30 %  Resp: 24    Capped on 2L NC currently     EXAM:  Gen: NAD in chair with trach capped   HEENT: trach midline/intact, no stridor   CV: RRR, no m/g/r  Resp: CTAB; non-labored, no wheeze  Abd: soft, nontender  Skin: b/l lower extremity rash(improved), large bulla on right dorsum(wrapped)  Ext: 3+ b/l lower extremity edema  Neuro: alert, follows commands     Labs:  Venous Blood Gas  Recent Labs   Lab 01/04/24  0550   PHV 7.46*   PCO2V 58*   PO2V 28   HCO3V 38*   SHAWN 16.9       Complete Blood Count   Recent Labs   Lab 01/04/24  0550 01/01/24  0615 12/31/23  0506   WBC 8.1 6.8 5.8   HGB 7.5*  7.7* 7.7* 7.4*    335 275     Basic Metabolic Panel  Recent Labs   Lab 01/05/24  0548 01/04/24  0550 01/02/24  0550 01/01/24  0615 12/31/23  0506    141  141  --  145 145   POTASSIUM 3.9 3.7  3.9 3.9 3.9 3.2*   CHLORIDE 95* 96*  --  100 100   CO2 37* 39*  --  40* " 38*   BUN 24.0* 30.2*  --  36.9* 37.2*   CR 1.15* 1.17*  --  0.95 0.96*   GLC 88 85  85  --  108* 135*     Liver Function Tests  Recent Labs   Lab 01/05/24  0548 01/04/24  0550 01/03/24  0641 01/02/24  0550 01/01/24  0615 12/31/23  0506   AST  --  22  --   --  28 20   ALT  --  9  --   --  8 9   ALKPHOS  --  102  --   --  122 118   BILITOTAL  --  0.7  --   --  0.7 0.6   ALBUMIN  --  2.7*  --   --  3.1* 2.9*   INR 2.89* 3.95* 1.94* 1.74* 1.69* 2.39*     Coagulation Profile  Recent Labs   Lab 01/05/24  0548 01/04/24  0550 01/03/24  0641 01/02/24  0550   INR 2.89* 3.95* 1.94* 1.74*       Radiology: Personally reviewed; radiology read below    XR CHEST 12/27/2023  Tracheostomy. Sternotomy and valve replacement. Enteric tube within the stomach. Right upper extremity PICC line tip in the distal SVC. Heart is mildly enlarged, unchanged. Elevation of the right hemidiaphragm, unchanged. Minimal atelectasis at the right lung base. Lungs otherwise appear clear    XR CHEST 12/19/2023  Poststernotomy changes with a prosthetic TVR and tracheostomy tube tube in place. Feeding tube can be seen coursing into the stomach. Right upper extremity PICC line catheter overlies the proximal right atrium. Elevation of the right hemidiaphragm, unchanged with small right effusion and right basilar and likely middle lobe opacities, presumed atelectasis. Left lung is clear. Heart is slightly enlarged. No signs of failure.    Matt Kennedy, TATIANA  Pulmonary Medicine  [facility]  Pager [phone]  Office [phone]

## 2024-01-05 NOTE — PLAN OF CARE
Problem: Adult Inpatient Plan of Care  Goal: Optimal Comfort and Wellbeing  Outcome: Progressing  Intervention: Provide Person-Centered Care  Recent Flowsheet Documentation  Taken 1/5/2024 0131 by Susan Julian RN  Trust Relationship/Rapport:   care explained   choices provided   Goal Outcome Evaluation:  Patient is alert and oriented x4. Vital signs stable. No signs of respiratory distress. On trached capped. With O2 on via nasal cannula @ 2L. Able to go to bathroom with walker multiple times.  Pain in the neck claimed. Prn tylenol given at 0343. Medication effective. Slept on anf off during the night. All needs attended.

## 2024-01-05 NOTE — PROGRESS NOTES
Merged with [facility]    Medicine Progress Note - Hospitalist Service    Date of Admission:  12/15/2023    Brief summary:  Priya Funez is a 83 year old female with a PMHx of HFpEF, severe TR, permanent AF s/p AVN ablation with PPM implant 1/11/19 s/p extraction TV (transvenous) PPM and implant leadless PPM 7/31/23, emergent aortic dissection repair 1/4/19, HTN, CKD, NEDA, and obesity who presented to [facility] as an outpatient for elective TVR done on 11/28/2023.  Immediate postoperative course was complicated by acute hypoxic and hypercapnic respiratory failure requiring BiPAP and probable pneumonia and treated with 7 days of IV Zosyn, patient was transferred to surgical telemetry floor on 12/2/2020.  On 12/6/2023 she was transferred back to CVICU after RRT for acute hypoxic respiratory distress and subsequent respiratory arrest with short CPR, re-intubation, and ROSC achieved. She sustained mildly displaced and nondisplaced right 3-5 rib fractures with associated right chest wall hematoma and nondisplaced anterolateral left 4th and 5th rib fractures. She was treated with meropenem 7 day course. She also has hx of recent C diff and multiple loose stools, for which she completed a 7 day po vancomycin course (per antimicrobial stewardship team, more likely to be colonization with recent history and no apparent treatment). Patient was transiently hyperglycemic and treated with insulin infusion then transitioned to sliding scale insulin per protocol. Blood sugars remained stable, has not required insulin coverage.  She remained intubated in the CVICU until undergoing tracheostomy by Dr. Pitts on 12/13/23, doing well with trach dome and PS currently.  She was treated for volume overload with diuretics (Bumex and chlorothiazide) with subsequent hypernatremia and hypotension requiring FWF and pressor support.  Spironolactone, torsemide and metoprolol discontinued and midodrine started with now stable blood pressures. Pre-op weight 70.9 kg,  discharge weight 74.1 kg (163 lbs) on 12/15/2023.  Patient was discharged to LTACH 12/15/2023 for continuing vent weaning, therapies      LTACH course:  12/17: Vitals stable.  Scheduled midodrine discontinued with BP stable in the 140s.  Diuretic therapy with torsemide restarted given peripheral edema and volume status, however at lower dose of 10 mg daily (home dose 40 mg daily) titrate slowly based on BP response.  Resume metoprolol for A-fib rate control as well gradually.  Labs reviewed, stable electrolytes, creatinine 0.82. ProBNP 3710, down from 86724 and 67784.  Hgb 8.0 from 7.6.  INR 0.98 from 1.12.  Continue heparin and Coumadin together for now until INR therapeutic.  Started hydroxyzine for anxiety 12/17.  A.m. labs ordered.       12/18-12/24:  increased torsemide to 20 mg po daily due to peripheral edema on 12/18, having loose stools,  changed tube feeds to higher fiber formula and add imodium daily and prn.  Still with edema,  increase toresemide to 40 mg po daily on 12/19 and increased imodium to bid and prn, changed heparin to lovenox for pt comfort.  She is getting prn hydroxyzine for anxiety which we should try to titrate off as anticoholinergics are not optimal in the geriatric population. 12/22 switched to bumex 1 mg daily and increased to bumex 1 mg BID 12/23.  Encouraged leg elevation above heart. Give hydrochlorothiazide one dose 12/24 - if that doesn't help then would need to try IV diuretics.  INR above 2 for the first time on 12/24 so if above 2 on 12/25 then could stop the lovenox.    12/25 - 12/29.  Had extensive lower extremity edema.  Switched Bumex to IV and then increased it from Bumex 1 mg twice daily to Bumex 1.5 mg twice daily.  Has been diuresing well.  Developed dermatitis on the lower extremities.  Started hydrocortisone cream.  12/27, VBG revealed hypercapnia she was put back on the mechanical ventilator with improvement.  12/29. Continues to diurese well. Plan to monitor kidney  function with creatinine.  She remains on vent weaning phase 2.  Making some progress.    12/30-1/5:  increased bumex to 2 mg IV bid- changed on 12/30, some erythema, will monitor - may not  need to treat for cellulitis.  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  Increased urinary output, decreased swelling of LE.  On 1/1,  spoke with patient and she would like to be DNR.  She will go through with the swallow test tomorrow but has decided she no longer will have NJ tube in and will eat no matter what the test results are.  She agrees to continue with NJ tube tube until tomorrow.  She reports that her legs feel better.  On 1/2:  NJ discontinued, passed swallow test, diet changed to bite sized food and thin liquids  1/3:  walking around nursing station with PT and walker and 2L of O2 with trach capped, doing well, in much better spirits today.  Reports stool is getting more thick and wants to be off scheduled imodium, it was discontinued, she still has prn ordered.  Improved LE swelling and increased creatinine- decreased bumex to 1.5 IV bid.      Assessment & Plan   Acute respiratory failure and arrest on 12/6/23   Acute hypercapnic and hypoxic respiratory failure requiring BiPAP  s/p tracheostomy 12/13/23  s/p Mechanical ventilation  Aspiration pneumonitis vs pneumonia  Possible aspiration event 12/1  Near complete plugging of left lower lobe on CT   Acute mildly displaced and nondisplaced right 3-5 rib fractures with associated right chest wall hematoma and nondisplaced anterolateral left fourth and fifth rib fractures  - weaning per pulmonology  -RT for tracheostomy cares, oxygenation, nebs, pulmonary therapies  - Tracheostomy sutures removed (12/18/2023)  - Mucomyst, duoneb w/ RT   - SLP speaking valve as tolerated      S/p respiratory arrest 12/6  Hx of severe TR s/p TVR  HFpEF  Hx of Afib s/p AVN ablation - PPM,  upgraded to leadless 7/2023  Hypertension  Hyperlipidemia  Hx of aortic dissection repair 2019  Respiratory arrest 12/6   - Stable paced rhythm with underlying atrial flutter. On review of cardiology, underlying aflutter/afib has been present since at least June of this year. Device RN adjusted PPM to PTA settings rate 60 at [facility].   - Pre-op 9/8/23 echo: LVEF 55/60%, mildly dilated RV, normal function  - Echo 12/4 with LVEF 60-65%, normal RV function, TV mean gradient 5 mmHg, no TR  - Echo 12/7 with no significant change from previous with the exception of mild reduction of RV. RV free wall dyskinesia.   - ASA 81 mg daily  -was on scheduled midodrine 10 mg Q8H but stopped that 12/16/23 since BP in the 140s systolic and stable  -Continue midodrine 5 mg TID PRN for SBP<100   - Started Warfarin 12/15.    -Discontinued lovenox 12/26/2023. Continue with coumadin INR on goal.Pharmacy consulted for Coumadin dosing (INR goal 2-3)      Acute on chronic diastolic heart failure with Elevated BNP, improving   - Held PTA spironolactone, torsemide, metoprolol due to hypotension at prior hospital  - Echo 12/4 with LVEF 60-65%, normal RV function, TV mean gradient 5 mmHg, no TR  - Echo 12/7 with no significant change from previous with the exception of mild reduction of RV. RV free wall dyskinesia.   - FLUID STATUS: Pre-op weight 70.9 kg, discharge weight 74.1 kg (163 lbs) on 12/15/2023 and 165 lbs on 12/16  -increased torsemide to 40 mg po daily on 12/19 then switched to bumex 12/22 and increased to bumex 1 mg BID on 12/23- increased to 1.5, now increased bumex to 2 mg IV bid on 12/30- decrease to 1.5 mg IV bid on 1/4   -proBNP trend appears improved from a peak of 95769 to now 3996 1/5  -has a new blister on her right foot likely due to fluid retention      blister on top of Right foot  -  likely due to fluid retention  -blister popped on 12/30  -monitor for cellulitis    Multiple loose stools   Hx of C. Difficile  (9/2023)  Severe malnutrition in the context of acute illness  - s/p tube feeds   -passed swallow on 1/2, diet changed from tube feeds to bite sized and thin liquids.    - PPI famotidine  - Bowel regimen discontinued due to continued loose stools  - Finished vancomycin po 7 day course   - PRN Zofran  4 mg PO or IV Q6H  - PRN Compazine 5 mg IV Q6H   - loperamide 2 mg BID and prn     Hypernatremia   Hypokalemia  CKD stage 3  Lactic acidosis, resolved  IMAN on CKD, resolved   Edema  Baseline creat ~1.2-1.3   FLUID STATUS: Pre-op weight 70.9 kg, discharge weight 74.1 kg (163 lbs).   - Elevated sodium, Na 151 --> 147 on recheck, likely 2/2 aggressive diureses S/p bumex and chlorotiazide prior to transfer  -On free water flushes 30ml q4h  - I/O has been inaccurate due to unmeasured stools and voids.  - Replete lytes per protocol  - Avoid/limit nephrotoxins as able  - Strict I/O, daily weights, avoid/limit nephrotoxins     Stress induced hyperglycemia  T2DM  Hgb A1c 5.7%, stable off meds  - PTA Jardiance on hold, resume once medically optimized   - Goal BG <180 for optimal healing     Stress induced leukocytosis  Probable aspiration pneumonitis vs pneumonia, resolved  - 11/30 Zosyn 7-day course for pneumonia (finished)   - 12/6 Given further decompensation, Vanco and meropenem was started on 12/6   - Finished meropenem 7 day course (12/12)   - Finished Vancomycin PO 7 day course (for c-diff) (12/13)  - Blood culture with NGTD   - Urine with Candida and sputum with Yeast, likely both colonizations.   - Urine culture with NGTD     Hx of recent C. Difficile  - C. Difficile toxin PCR positive 12/3 with negative antigen and negative toxin - per antimicrobial stewardship team, more likely to be colonization with recent history and no apparent treatment  - Finished oral vancomycin course on 12/13, still with diarrhea but no abd pain, leucocytosis, or fevers so monitoring for now  - Monitor fever curve, WBC, and inflammatory  "markers as appropriate  - Enteric precautions per infection prevention protocol      3rd, 4th right rib fractures due to CPR  -Continue mini thoracotomy precautions  -Judicious use of narcotics, de-escalate as able     Acute blood loss anemia  - Hgb has been stable.   -Hgb 7.6 on 12/16 and 8.0 on 12/17/2023  -Iron studies: Serum iron 29, iron binding capacity 216, saturation index 13%.  -Follow CBC with weekly labs.   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   -f/up outpt     Diet: Soft & Bite Sized Diet (level 6) Thin Liquids (level 0)  Calorie Counts  Snacks/Supplements Adult: Ensure Clear; With Meals  Snacks/Supplements Adult: Gelatein Plus; With Meals  Snacks/Supplements Adult: Magic Cup; With Meals    DVT Prophylaxis: Enoxaparin (Lovenox) subcutaneous transitioning to warfarin   Barrientos Catheter: Not present  Lines: PRESENT      PICC 12/07/23 Triple Lumen Right Basilic ok to use PICC-Site Assessment: WDL      Cardiac Monitoring: None  Code Status: No CPR- Do NOT Intubate      Clinically Significant Risk Factors          # Hypocalcemia: Lowest iCa = 1.14 mg/dL in last 2 days, will monitor and replace as appropriate     # Hypoalbuminemia: Lowest albumin = 2.7 g/dL at 1/4/2024  5:50 AM, will monitor as appropriate     # Hypertension: Noted on problem list    # Acute heart failure with preserved ejection fraction: heart failure noted on problem list, last echo with EF >50%, and receiving IV diuretics       # Overweight: Estimated body mass index is 28.37 kg/m  as calculated from the following:    Height as of this encounter: 1.613 m (5' 3.5\").    Weight as of this " encounter: 73.8 kg (162 lb 11.2 oz).   # Severe Malnutrition: based on nutrition assessment      # Financial/Environmental Concerns: none   # Pacemaker present  # History of CABG: noted on surgical history       Disposition Plan      Expected Discharge Date: 01/15/2024      Destination: home with family;foster/protective services            Clarissa Ndiaye MD  Hospitalist Service  LTACH  Securely message with [employer] (more info)  Text page via [facility] Paging/Directory   ______________________________________________________________________    Interval History   She reports that she feels about the same as yesterday    No chest pain, + dyspnea with exertion     Physical Exam   Vital Signs: Temp: 97.7  F (36.5  C) Temp src: Oral BP: (!) 143/64 Pulse: 64   Resp: 24 SpO2: 99 % O2 Device: Nasal cannula Oxygen Delivery: 2 LPM  Weight: 162 lbs 11.2 oz    GENERAL: no respiratory distress  HEENT: Head is normocephalic atraumatic eyes pupils appear equal round and reactive to light  NECK: Supple, Tracheostomy in place  LUNGS: clear to auscultation, decreased breath sounds at bases   HEART: S1 S2, Rate and rhythm is regular. No murmurs, 2+ BLE edema  ABDOMEN: Soft, nontender, no distension. Bowel sounds are positive. No guarding or rebound.  :  stool coming out of vagina    EXTREMITIES: 2+ bilateral lower extremity edema noted   SKIN: popped blister R foot, +erythema of b/l lower extremities   PSYCHIATRIC: Normal affect and cognition     Medical Decision Making       52 MINUTES SPENT BY ME on the date of service doing chart review, history, exam, documentation & further activities per the note.      Data   Lab Results   Component Value Date    WBC 6.1 12/28/2023    WBC 5.3 06/14/2021     Lab Results   Component Value Date    RBC 2.64 12/28/2023    RBC 4.33 06/14/2021     Lab Results   Component Value Date    HGB 7.8 12/28/2023    HGB 12.8 06/14/2021     Lab Results   Component Value Date    HCT 25.9 12/28/2023    HCT 40.1  06/14/2021     Lab Results   Component Value Date    MCV 98 12/28/2023    MCV 93 06/14/2021     Lab Results   Component Value Date    MCH 29.5 12/28/2023    MCH 29.6 06/14/2021     Lab Results   Component Value Date    MCHC 30.1 12/28/2023    MCHC 31.9 06/14/2021     Lab Results   Component Value Date    RDW 17.9 12/28/2023    RDW 13.5 06/14/2021     Lab Results   Component Value Date     12/28/2023     06/14/2021       Last Comprehensive Metabolic Panel:  Sodium   Date Value Ref Range Status   01/05/2024 138 135 - 145 mmol/L Final     Comment:     Reference intervals for this test were updated on 09/26/2023 to more accurately reflect our healthy population. There may be differences in the flagging of prior results with similar values performed with this method. Interpretation of those prior results can be made in the context of the updated reference intervals.    06/23/2021 138 133 - 144 mmol/L Final     Potassium   Date Value Ref Range Status   01/05/2024 3.9 3.4 - 5.3 mmol/L Final   12/13/2022 3.8 3.4 - 5.3 mmol/L Final   06/23/2021 4.5 3.4 - 5.3 mmol/L Final     Potassium POCT   Date Value Ref Range Status   01/04/2024 3.7 3.5 - 5.0 mmol/L Final     Chloride   Date Value Ref Range Status   01/05/2024 95 (L) 98 - 107 mmol/L Final   12/13/2022 99 94 - 109 mmol/L Final   06/23/2021 104 94 - 109 mmol/L Final     Carbon Dioxide   Date Value Ref Range Status   06/23/2021 31 20 - 32 mmol/L Final     Carbon Dioxide (CO2)   Date Value Ref Range Status   01/05/2024 37 (H) 22 - 29 mmol/L Final   12/13/2022 33 (H) 20 - 32 mmol/L Final     Anion Gap   Date Value Ref Range Status   01/05/2024 6 (L) 7 - 15 mmol/L Final   12/13/2022 7 3 - 14 mmol/L Final   06/23/2021 3 3 - 14 mmol/L Final     Glucose   Date Value Ref Range Status   01/05/2024 88 70 - 99 mg/dL Final   12/13/2022 84 70 - 99 mg/dL Final   06/23/2021 86 70 - 99 mg/dL Final     GLUCOSE BY METER POCT   Date Value Ref Range Status   12/30/2023 101 (H) 70  - 99 mg/dL Final     Glucose Whole Blood POCT   Date Value Ref Range Status   01/04/2024 85 70 - 125 mg/dL Final     Urea Nitrogen   Date Value Ref Range Status   01/05/2024 24.0 (H) 8.0 - 23.0 mg/dL Final   12/13/2022 31 (H) 7 - 30 mg/dL Final   06/23/2021 30 7 - 30 mg/dL Final     Creatinine   Date Value Ref Range Status   01/05/2024 1.15 (H) 0.51 - 0.95 mg/dL Final   06/23/2021 1.29 (H) 0.52 - 1.04 mg/dL Final     GFR Estimate   Date Value Ref Range Status   01/05/2024 48 (L) >60 mL/min/1.73m2 Final   06/23/2021 39 (L) >60 mL/min/[1.73_m2] Final     Comment:     Non  GFR Calc  Starting 12/18/2018, serum creatinine based estimated GFR (eGFR) will be   calculated using the Chronic Kidney Disease Epidemiology Collaboration   (CKD-EPI) equation.       Calcium   Date Value Ref Range Status   01/05/2024 8.9 8.8 - 10.2 mg/dL Final   06/23/2021 9.2 8.5 - 10.1 mg/dL Final     Bilirubin Total   Date Value Ref Range Status   01/04/2024 0.7 <=1.2 mg/dL Final   04/04/2019 1.0 0.2 - 1.3 mg/dL Final     Alkaline Phosphatase   Date Value Ref Range Status   01/04/2024 102 40 - 150 U/L Final     Comment:     Reference intervals for this test were updated on 11/14/2023 to more accurately reflect our healthy population. There may be differences in the flagging of prior results with similar values performed with this method. Interpretation of those prior results can be made in the context of the updated reference intervals.   04/04/2019 104 40 - 150 U/L Final     ALT   Date Value Ref Range Status   01/04/2024 9 0 - 50 U/L Final     Comment:     Reference intervals for this test were updated on 6/12/2023 to more accurately reflect our healthy population. There may be differences in the flagging of prior results with similar values performed with this method. Interpretation of those prior results can be made in the context of the updated reference intervals.     06/14/2021 22 0 - 50 U/L Final     AST   Date Value Ref  Range Status   01/04/2024 22 0 - 45 U/L Final     Comment:     Reference intervals for this test were updated on 6/12/2023 to more accurately reflect our healthy population. There may be differences in the flagging of prior results with similar values performed with this method. Interpretation of those prior results can be made in the context of the updated reference intervals.   04/04/2019 27 0 - 45 U/L Final

## 2024-01-05 NOTE — PLAN OF CARE
Problem: Artificial Airway  Goal: Effective Communication  Outcome: Progressing  Goal: Optimal Device Function  Outcome: Progressing  Intervention: Optimize Device Care and Function  Recent Flowsheet Documentation  Taken 12/24/2023 1322 by Allison Casarez RT  Airway Safety Measures: all equipment/monitors on and audible  Taken 12/24/2023 1203 by Allison Casarez RT  Airway Safety Measures: all equipment/monitors on and audible  Taken 12/24/2023 0720 by Allison Casarez RT  Airway Safety Measures: all equipment/monitors on and audible  Goal: Absence of Device-Related Skin or Tissue Injury  Outcome: Progressing   Goal Outcome Evaluation:       RT PROGRESS NOTE   [id number]  DATA:     CURRENT SETTINGS:             TRACH TYPE / SIZE: #6 Bivona downsized from #6 Shiley on 12/22/23             MODE: Cap 2L NC             FIO2:        ACTION:             THERAPIES: Albuterol neb PRN             SUCTION:                           FREQUENCY:  none                        AMOUNT:                           CONSISTENCY:                           COLOR:                SPONTANEOUS COUGH EFFORT/STRENGTH OF EFFORT (not elicited by suctioning):                               WEANING PHASE:3, re-started on 1/2/24                          WEAN MODE:   Cap cont on 2L NC since yesterday, 1/4/24                         WEAN TIME:                           END WEAN REASON: Plan - TM/PMV at noc, it will be the 1st noc off vent     RESPONSE:             BS:                VITAL SIGNS:                EMOTIONAL NEEDS / CONCERNS:                  RISK FOR SELF DECANNULATION:                          RISK DUE TO:                          INTERVENTION/S IN PLACE IS/ARE:         NOTE / PLAN:   Goal Outcome Evaluation:     Last night was the 1st noc  Capped    VBG in a.m.     Cont to monitor and treat

## 2024-01-05 NOTE — PLAN OF CARE
Problem: Adult Inpatient Plan of Care  Goal: Optimal Comfort and Wellbeing  Outcome: Progressing    Problem: Pain Acute  Goal: Optimal Pain Control and Function    Goal Outcome Evaluation:       Pt alert and oriented x4.  No complaints of pain or discomfort.  Took medications whole by mouth with applesauce.  O2 2L via nasal cannula.

## 2024-01-05 NOTE — PLAN OF CARE
Problem: Artificial Airway  Goal: Effective Communication  Outcome: Progressing  Goal: Optimal Device Function  Outcome: Progressing  Intervention: Optimize Device Care and Function  Recent Flowsheet Documentation  Taken 1/4/2024 2058 by Rosita Sifuentes RT  Airway Safety Measures:   all equipment/monitors on and audible   manual resuscitator/mask/valve at bedside   manual resuscitator/mask/valve in room   oxygen flowmeter   suction at bedside   suction equipment   suction regulator  Goal: Absence of Device-Related Skin or Tissue Injury  Outcome: Progressing  RT PROGRESS NOTE     DATA:     CURRENT SETTINGS:             TRACH TYPE / SIZE: #6 Bivona (Placed 12/22)             MODE:  Trach Cap             FIO2:  2 L NC      ACTION:             THERAPIES:              SUCTION: Yes                          FREQUENCY: 1x                        AMOUNT:  Scant                        CONSISTENCY: Thick                        COLOR:  Pale yellow             SPONTANEOUS COUGH EFFORT/STRENGTH OF EFFORT (not elicited by suctioning): good productive cough.                              WEANING PHASE:  3                        WEAN MODE:  Capped with 2 L NC                        WEAN TIME:   Continuously as marbella.                        END WEAN REASON:       RESPONSE:             BS:   Diminished             VITAL SIGNS:  Sat: % HR: 60-66  RR: 16-22             EMOTIONAL NEEDS / CONCERNS:  None                 RISK FOR SELF DECANNULATION:  None                        RISK DUE TO:                          INTERVENTION/S IN PLACE IS/ARE: N/A       NOTE / PLAN:      Pt currently remains capped with 2 L nasal canula and tolerating well.  Continue current care plan.

## 2024-01-05 NOTE — PLAN OF CARE
"Goal Outcome Evaluation:     Pt attended therapies. She was up to the bath room frequently and had loose BM. Ate well. Denied pain. She has a lot of visitors at this time.         Problem: Adult Inpatient Plan of Care  Goal: Plan of Care Review  Description: The Plan of Care Review/Shift note should be completed every shift.  The Outcome Evaluation is a brief statement about your assessment that the patient is improving, declining, or no change.  This information will be displayed automatically on your shift  note.  Outcome: Progressing  Goal: Patient-Specific Goal (Individualized)  Description: You can add care plan individualizations to a care plan. Examples of Individualization might be:  \"Parent requests to be called daily at 9am for status\", \"I have a hard time hearing out of my right ear\", or \"Do not touch me to wake me up as it startles  me\".  Outcome: Progressing  Goal: Absence of Hospital-Acquired Illness or Injury  Outcome: Progressing  Intervention: Identify and Manage Fall Risk  Recent Flowsheet Documentation  Taken 1/5/2024 0900 by Tej Rincon RN  Safety Promotion/Fall Prevention: room near nurse's station  Intervention: Prevent Infection  Recent Flowsheet Documentation  Taken 1/5/2024 0900 by Tej Rincon RN  Infection Prevention:   equipment surfaces disinfected   hand hygiene promoted  Goal: Optimal Comfort and Wellbeing  Outcome: Progressing  Intervention: Provide Person-Centered Care  Recent Flowsheet Documentation  Taken 1/5/2024 0900 by Tej Rincon RN  Trust Relationship/Rapport:   care explained   choices provided  Goal: Readiness for Transition of Care  Outcome: Progressing     "

## 2024-01-06 NOTE — PLAN OF CARE
Problem: Anxiety Signs/Symptoms  Goal: Improved Sleep (Anxiety Signs/Symptoms)  Outcome: Not Progressing     Problem: Anxiety Signs/Symptoms  Goal: Optimized Energy Level (Anxiety Signs/Symptoms)  Outcome: Progressing   Goal Outcome Evaluation:    Pt did not sleep well, and frequently alerted staff to either shortness of breath, coughing, or the urge to urinate. VSS, denied pain. Complained of nausea caused by strong coughing fit, but declined PRN antiemetics as pt attested that nausea subsided with the coughing. Phosphorus and INR labs drawn this shift. Toileted 3 times this shift.

## 2024-01-06 NOTE — PROGRESS NOTES
[facility]    Medicine Progress Note - Hospitalist Service    Date of Admission:  12/15/2023    Brief summary:  Priya Funez is a 83 year old female with a PMHx of HFpEF, severe TR, permanent AF s/p AVN ablation with PPM implant 1/11/19 s/p extraction TV (transvenous) PPM and implant leadless PPM 7/31/23, emergent aortic dissection repair 1/4/19, HTN, CKD, NEDA, and obesity who presented to [facility] as an outpatient for elective TVR done on 11/28/2023.  Immediate postoperative course was complicated by acute hypoxic and hypercapnic respiratory failure requiring BiPAP and probable pneumonia and treated with 7 days of IV Zosyn, patient was transferred to surgical telemetry floor on 12/2/2020.  On 12/6/2023 she was transferred back to CVICU after RRT for acute hypoxic respiratory distress and subsequent respiratory arrest with short CPR, re-intubation, and ROSC achieved. She sustained mildly displaced and nondisplaced right 3-5 rib fractures with associated right chest wall hematoma and nondisplaced anterolateral left 4th and 5th rib fractures. She was treated with meropenem 7 day course. She also has hx of recent C diff and multiple loose stools, for which she completed a 7 day po vancomycin course (per antimicrobial stewardship team, more likely to be colonization with recent history and no apparent treatment). Patient was transiently hyperglycemic and treated with insulin infusion then transitioned to sliding scale insulin per protocol. Blood sugars remained stable, has not required insulin coverage.  She remained intubated in the CVICU until undergoing tracheostomy by Dr. Pitts on 12/13/23, doing well with trach dome and PS currently.  She was treated for volume overload with diuretics (Bumex and chlorothiazide) with subsequent hypernatremia and hypotension requiring FWF and pressor support.  Spironolactone, torsemide and metoprolol discontinued and midodrine started with now stable blood pressures. Pre-op weight 70.9 kg,  discharge weight 74.1 kg (163 lbs) on 12/15/2023.  Patient was discharged to LTACH 12/15/2023 for continuing vent weaning, therapies      LTACH course:  12/17: Vitals stable.  Scheduled midodrine discontinued with BP stable in the 140s.  Diuretic therapy with torsemide restarted given peripheral edema and volume status, however at lower dose of 10 mg daily (home dose 40 mg daily) titrate slowly based on BP response.  Resume metoprolol for A-fib rate control as well gradually.  Labs reviewed, stable electrolytes, creatinine 0.82. ProBNP 3710, down from 06372 and 10101.  Hgb 8.0 from 7.6.  INR 0.98 from 1.12.  Continue heparin and Coumadin together for now until INR therapeutic.  Started hydroxyzine for anxiety 12/17.  A.m. labs ordered.       12/18-12/24:  increased torsemide to 20 mg po daily due to peripheral edema on 12/18, having loose stools,  changed tube feeds to higher fiber formula and add imodium daily and prn.  Still with edema,  increase toresemide to 40 mg po daily on 12/19 and increased imodium to bid and prn, changed heparin to lovenox for pt comfort.  She is getting prn hydroxyzine for anxiety which we should try to titrate off as anticoholinergics are not optimal in the geriatric population. 12/22 switched to bumex 1 mg daily and increased to bumex 1 mg BID 12/23.  Encouraged leg elevation above heart. Give hydrochlorothiazide one dose 12/24 - if that doesn't help then would need to try IV diuretics.  INR above 2 for the first time on 12/24 so if above 2 on 12/25 then could stop the lovenox.    12/25 - 12/29.  Had extensive lower extremity edema.  Switched Bumex to IV and then increased it from Bumex 1 mg twice daily to Bumex 1.5 mg twice daily.  Has been diuresing well.  Developed dermatitis on the lower extremities.  Started hydrocortisone cream.  12/27, VBG revealed hypercapnia she was put back on the mechanical ventilator with improvement.  12/29. Continues to diurese well. Plan to monitor kidney  function with creatinine.  She remains on vent weaning phase 2.  Making some progress.    12/30-1/5:  increased bumex to 2 mg IV bid- changed on 12/30, some erythema, will monitor - may not  need to treat for cellulitis.  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  Increased urinary output, decreased swelling of LE.  On 1/1,  spoke with patient and she would like to be DNR.  She will go through with the swallow test tomorrow but has decided she no longer will have NJ tube in and will eat no matter what the test results are.  She agrees to continue with NJ tube tube until tomorrow.  She reports that her legs feel better.  On 1/2:  NJ discontinued, passed swallow test, diet changed to bite sized food and thin liquids  1/3:  walking around nursing station with PT and walker and 2L of O2 with trach capped, doing well, in much better spirits today.  Reports stool is getting more thick and wants to be off scheduled imodium, it was discontinued, she still has prn ordered.  Improved LE swelling and increased creatinine- decreased bumex to 1.5 IV bid.    1/6: still with LE edema despite aggressive IV diuresis.  Encouraged ambulation and leg elevation.  Weight plateauing - will add metolazone one time and change dosing times of bumex    Assessment & Plan   Acute respiratory failure and arrest on 12/6/23   Acute hypercapnic and hypoxic respiratory failure requiring BiPAP  s/p tracheostomy 12/13/23  s/p Mechanical ventilation  Aspiration pneumonitis vs pneumonia  Possible aspiration event 12/1  Near complete plugging of left lower lobe on CT   Acute mildly displaced and nondisplaced right 3-5 rib fractures with associated right chest wall hematoma and nondisplaced anterolateral left fourth and fifth rib fractures  - weaning per pulmonology  -RT for tracheostomy cares, oxygenation, nebs, pulmonary therapies  - Tracheostomy sutures  removed (12/18/2023)  - Mucomyst, duoneb w/ RT   - SLP speaking valve as tolerated      S/p respiratory arrest 12/6  Hx of severe TR s/p TVR  HFpEF  Hx of Afib s/p AVN ablation - PPM, upgraded to leadless 7/2023  Hypertension  Hyperlipidemia  Hx of aortic dissection repair 2019  Respiratory arrest 12/6   - Stable paced rhythm with underlying atrial flutter. On review of cardiology, underlying aflutter/afib has been present since at least June of this year. Device RN adjusted PPM to PTA settings rate 60 at [facility].   - Pre-op 9/8/23 echo: LVEF 55/60%, mildly dilated RV, normal function  - Echo 12/4 with LVEF 60-65%, normal RV function, TV mean gradient 5 mmHg, no TR  - Echo 12/7 with no significant change from previous with the exception of mild reduction of RV. RV free wall dyskinesia.   - ASA 81 mg daily  -was on scheduled midodrine 10 mg Q8H but stopped that 12/16/23 since BP in the 140s systolic and stable  -Continue midodrine 5 mg TID PRN for SBP<100   - Started Warfarin 12/15.    -Discontinued lovenox 12/26/2023. Continue with coumadin INR on goal.Pharmacy consulted for Coumadin dosing (INR goal 2-3)      Acute on chronic diastolic heart failure with Elevated BNP, improving   - Held PTA spironolactone, torsemide, metoprolol due to hypotension at prior hospital  - Echo 12/4 with LVEF 60-65%, normal RV function, TV mean gradient 5 mmHg, no TR  - Echo 12/7 with no significant change from previous with the exception of mild reduction of RV. RV free wall dyskinesia.   - FLUID STATUS: Pre-op weight 70.9 kg, discharge weight 74.1 kg (163 lbs) on 12/15/2023 and 165 lbs on 12/16  -increased torsemide to 40 mg po daily on 12/19 then switched to bumex 12/22 and increased to bumex 1 mg BID on 12/23- increased to 1.5, now increased bumex to 2 mg IV bid on 12/30- decrease to 1.5 mg IV bid on 1/4 but now weight plateauing so will move dosing times and give one dose metolazone 1/7 AM 1 hour prior to bumex.  -proBNP trend appears  improved from a peak of 95174 to now 3996 1/5  -has a new blister on her right foot likely due to fluid retention      blister on top of Right foot  -  likely due to fluid retention  -blister popped on 12/30  -monitor for cellulitis    Multiple loose stools   Hx of C. Difficile (9/2023)  Severe malnutrition in the context of acute illness  - s/p tube feeds   -passed swallow on 1/2, diet changed from tube feeds to bite sized and thin liquids.    - PPI famotidine  - Bowel regimen discontinued due to continued loose stools  - Finished vancomycin po 7 day course   - PRN Zofran  4 mg PO or IV Q6H  - PRN Compazine 5 mg IV Q6H   - loperamide 2 mg BID and prn     Hypernatremia   Hypokalemia  CKD stage 3  Lactic acidosis, resolved  IMAN on CKD, resolved   Edema  Baseline creat ~1.2-1.3   FLUID STATUS: Pre-op weight 70.9 kg, discharge weight 74.1 kg (163 lbs).   - Elevated sodium, Na 151 --> 147 on recheck, likely 2/2 aggressive diureses S/p bumex and chlorotiazide prior to transfer  -On free water flushes 30ml q4h  - I/O has been inaccurate due to unmeasured stools and voids.  - Replete lytes per protocol  - Avoid/limit nephrotoxins as able  - Strict I/O, daily weights, avoid/limit nephrotoxins     Stress induced hyperglycemia  T2DM  Hgb A1c 5.7%, stable off meds  - PTA Jardiance on hold, resume once medically optimized   - Goal BG <180 for optimal healing     Stress induced leukocytosis  Probable aspiration pneumonitis vs pneumonia, resolved  - 11/30 Zosyn 7-day course for pneumonia (finished)   - 12/6 Given further decompensation, Vanco and meropenem was started on 12/6   - Finished meropenem 7 day course (12/12)   - Finished Vancomycin PO 7 day course (for c-diff) (12/13)  - Blood culture with NGTD   - Urine with Candida and sputum with Yeast, likely both colonizations.   - Urine culture with NGTD     Hx of recent C. Difficile  - C. Difficile toxin PCR positive 12/3 with negative antigen and negative toxin - per  antimicrobial stewardship team, more likely to be colonization with recent history and no apparent treatment  - Finished oral vancomycin course on 12/13, still with diarrhea but no abd pain, leucocytosis, or fevers so monitoring for now  - Monitor fever curve, WBC, and inflammatory markers as appropriate  - Enteric precautions per infection prevention protocol      3rd, 4th right rib fractures due to CPR  -Continue mini thoracotomy precautions  -Judicious use of narcotics, de-escalate as able     Acute blood loss anemia  - Hgb has been stable.   -Hgb 7.6 on 12/16 and 8.0 on 12/17/2023  -Iron studies: Serum iron 29, iron binding capacity 216, saturation index 13%.  -Follow CBC with weekly labs.   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12 mg/dL in last 2 days, will monitor and replace as appropriate     # Hypoalbuminemia: Lowest albumin = 2.7 g/dL at 1/4/2024  5:50 AM, will monitor as appropriate     # Hypertension: Noted on problem list    # Acute heart failure with preserved  "ejection fraction: heart failure noted on problem list, last echo with EF >50%, and receiving IV diuretics       # Overweight: Estimated body mass index is 28.37 kg/m  as calculated from the following:    Height as of this encounter: 1.613 m (5' 3.5\").    Weight as of this encounter: 73.8 kg (162 lb 11.2 oz).   # Severe Malnutrition: based on nutrition assessment      # Financial/Environmental Concerns: none   # Pacemaker present  # History of CABG: noted on surgical history       Disposition Plan     Expected Discharge Date: 01/15/2024      Destination: home with family;foster/protective services            Robert Downey MD  Hospitalist Service  LTACH  Securely message with [employer] (more info)  Text page via [employer] Paging/Directory   ______________________________________________________________________    Interval History   She reports that she feels about the same as yesterday - wants to be up walking more.  Thinks edema might be a little worse since bumex dose decreased.    No chest pain, + dyspnea with exertion     Physical Exam   Vital Signs: Temp: 98.3  F (36.8  C) Temp src: Oral BP: 117/56 Pulse: 60   Resp: 22 SpO2: 94 % O2 Device: Trach dome Oxygen Delivery: 30 LPM  Weight: 162 lbs 11.2 oz    GENERAL: no respiratory distress  HEENT: Head is normocephalic atraumatic eyes pupils appear equal round and reactive to light  NECK: Supple, Tracheostomy in place  LUNGS: clear to auscultation, decreased breath sounds at bases   HEART: S1 S2, Rate and rhythm is regular. No murmurs, 2+ BLE edema  ABDOMEN: Soft, nontender, no distension. Bowel sounds are positive. No guarding or rebound.  :  stool coming out of vagina    EXTREMITIES: 2+ bilateral lower extremity edema noted   SKIN: popped blister R foot, + minimal erythema of b/l lower extremities   PSYCHIATRIC: Normal affect and cognition     Medical Decision Making       50 MINUTES SPENT BY ME on the date of service doing chart review, history, exam, documentation & " further activities per the note.      Data   Lab Results   Component Value Date    WBC 6.1 12/28/2023    WBC 5.3 06/14/2021     Lab Results   Component Value Date    RBC 2.64 12/28/2023    RBC 4.33 06/14/2021     Lab Results   Component Value Date    HGB 7.8 12/28/2023    HGB 12.8 06/14/2021     Lab Results   Component Value Date    HCT 25.9 12/28/2023    HCT 40.1 06/14/2021     Lab Results   Component Value Date    MCV 98 12/28/2023    MCV 93 06/14/2021     Lab Results   Component Value Date    MCH 29.5 12/28/2023    MCH 29.6 06/14/2021     Lab Results   Component Value Date    MCHC 30.1 12/28/2023    MCHC 31.9 06/14/2021     Lab Results   Component Value Date    RDW 17.9 12/28/2023    RDW 13.5 06/14/2021     Lab Results   Component Value Date     12/28/2023     06/14/2021       Last Comprehensive Metabolic Panel:  Sodium   Date Value Ref Range Status   01/05/2024 138 135 - 145 mmol/L Final     Comment:     Reference intervals for this test were updated on 09/26/2023 to more accurately reflect our healthy population. There may be differences in the flagging of prior results with similar values performed with this method. Interpretation of those prior results can be made in the context of the updated reference intervals.    06/23/2021 138 133 - 144 mmol/L Final     Sodium POCT   Date Value Ref Range Status   01/06/2024 138 135 - 145 mmol/L Final     Potassium   Date Value Ref Range Status   12/13/2022 3.8 3.4 - 5.3 mmol/L Final   06/23/2021 4.5 3.4 - 5.3 mmol/L Final     Potassium POCT   Date Value Ref Range Status   01/06/2024 3.2 (L) 3.5 - 5.0 mmol/L Final     Chloride   Date Value Ref Range Status   01/05/2024 95 (L) 98 - 107 mmol/L Final   12/13/2022 99 94 - 109 mmol/L Final   06/23/2021 104 94 - 109 mmol/L Final     Carbon Dioxide   Date Value Ref Range Status   06/23/2021 31 20 - 32 mmol/L Final     Carbon Dioxide (CO2)   Date Value Ref Range Status   01/05/2024 37 (H) 22 - 29 mmol/L Final    12/13/2022 33 (H) 20 - 32 mmol/L Final     Anion Gap   Date Value Ref Range Status   01/05/2024 6 (L) 7 - 15 mmol/L Final   12/13/2022 7 3 - 14 mmol/L Final   06/23/2021 3 3 - 14 mmol/L Final     Glucose   Date Value Ref Range Status   12/13/2022 84 70 - 99 mg/dL Final   06/23/2021 86 70 - 99 mg/dL Final     GLUCOSE BY METER POCT   Date Value Ref Range Status   12/30/2023 101 (H) 70 - 99 mg/dL Final     Glucose Whole Blood POCT   Date Value Ref Range Status   01/06/2024 100 70 - 125 mg/dL Final     Urea Nitrogen   Date Value Ref Range Status   01/05/2024 24.0 (H) 8.0 - 23.0 mg/dL Final   12/13/2022 31 (H) 7 - 30 mg/dL Final   06/23/2021 30 7 - 30 mg/dL Final     Creatinine   Date Value Ref Range Status   01/05/2024 1.15 (H) 0.51 - 0.95 mg/dL Final   06/23/2021 1.29 (H) 0.52 - 1.04 mg/dL Final     GFR Estimate   Date Value Ref Range Status   01/05/2024 48 (L) >60 mL/min/1.73m2 Final   06/23/2021 39 (L) >60 mL/min/[1.73_m2] Final     Comment:     Non  GFR Calc  Starting 12/18/2018, serum creatinine based estimated GFR (eGFR) will be   calculated using the Chronic Kidney Disease Epidemiology Collaboration   (CKD-EPI) equation.       Calcium   Date Value Ref Range Status   01/05/2024 8.9 8.8 - 10.2 mg/dL Final   06/23/2021 9.2 8.5 - 10.1 mg/dL Final     Bilirubin Total   Date Value Ref Range Status   01/04/2024 0.7 <=1.2 mg/dL Final   04/04/2019 1.0 0.2 - 1.3 mg/dL Final     Alkaline Phosphatase   Date Value Ref Range Status   01/04/2024 102 40 - 150 U/L Final     Comment:     Reference intervals for this test were updated on 11/14/2023 to more accurately reflect our healthy population. There may be differences in the flagging of prior results with similar values performed with this method. Interpretation of those prior results can be made in the context of the updated reference intervals.   04/04/2019 104 40 - 150 U/L Final     ALT   Date Value Ref Range Status   01/04/2024 9 0 - 50 U/L Final      Comment:     Reference intervals for this test were updated on 6/12/2023 to more accurately reflect our healthy population. There may be differences in the flagging of prior results with similar values performed with this method. Interpretation of those prior results can be made in the context of the updated reference intervals.     06/14/2021 22 0 - 50 U/L Final     AST   Date Value Ref Range Status   01/04/2024 22 0 - 45 U/L Final     Comment:     Reference intervals for this test were updated on 6/12/2023 to more accurately reflect our healthy population. There may be differences in the flagging of prior results with similar values performed with this method. Interpretation of those prior results can be made in the context of the updated reference intervals.   04/04/2019 27 0 - 45 U/L Final

## 2024-01-06 NOTE — PLAN OF CARE
Problem: Adult Inpatient Plan of Care  Goal: Absence of Hospital-Acquired Illness or Injury  Intervention: Identify and Manage Fall Risk  Recent Flowsheet Documentation  Taken 1/5/2024 1758 by Valerie Swanson RN  Safety Promotion/Fall Prevention: room door open     Problem: Adult Inpatient Plan of Care  Goal: Optimal Comfort and Wellbeing  Intervention: Provide Person-Centered Care  Recent Flowsheet Documentation  Taken 1/5/2024 1758 by Valerie Swanson RN  Trust Relationship/Rapport: care explained   Goal Outcome Evaluation:       Patient remained calm and cooperative with cares. Patient ambulated in the room to the bathroom with a walker and assistance. At supper, patient ate well after tray set up. Calorie count continued. PRN  Atarax and Benadryl given at bed time per patient's request.

## 2024-01-06 NOTE — PLAN OF CARE
Problem: Artificial Airway  Goal: Effective Communication  Outcome: Progressing  Goal: Optimal Device Function  Outcome: Progressing  Goal: Absence of Device-Related Skin or Tissue Injury  Outcome: Progressing       RT PROGRESS NOTE     DATA:     CURRENT SETTINGS: AC/18/390/+5/30% (back up setting)             TRACH TYPE / SIZE:  #6 [place] changed on 12/22/23             MODE:  TM/PMV             FIO2:   30% ans 30 lpm      ACTION:             THERAPIES:   ALB Q6 PRNx1/MUCOMYST Q6 PRN x1             SUCTION:                           FREQUENCY:   Pt using Y[place]auer                         AMOUNT: x1 with lavage                          CONSISTENCY:  Thick                         COLOR:  White/yellow/ light brown             SPONTANEOUS COUGH EFFORT/STRENGTH OF EFFORT (not elicited by suctioning): Yes:strong                              WEANING PHASE:   #3                        WEAN MODE:    Trach capped/2-3 lpm nc                        WEAN TIME:                           END WEAN REASON:   Cont     RESPONSE:             BS:   Clear/diminish             VITAL SIGNS:   SAT 97-99%, HR 67-90  , RR 22-26             EMOTIONAL NEEDS / CONCERNS:  N/A                RISK FOR SELF DECANNULATION:  N/A                        RISK DUE TO:                          INTERVENTION/S IN PLACE IS/ARE:  N/A       NOTE / PLAN:   Pt was on trach capped with 3 lpm nc ,complaining shortness of breath. Suction with lavage for small amount of thick white/yellow/light brown.  PRN ALB/MUCOMYST has been given. Pt has been switched to 30% and 30 lpm tm/pmv due to SOB  and back pressure and pt said she feels much better. VBG  taken before pt was placed back on tm/pmv was 7.43/56/31/35/56.7%. Cont current therapy and keep sat >/=90%

## 2024-01-06 NOTE — PLAN OF CARE
"Goal Outcome Evaluation:     Pt was up in chair most of the time except ambulating to the bath room. Attended therapies. At well for both meals. Wound care done as ordered.  was here to visit and involved with cares.denied pain.          Problem: Adult Inpatient Plan of Care  Goal: Plan of Care Review  Description: The Plan of Care Review/Shift note should be completed every shift.  The Outcome Evaluation is a brief statement about your assessment that the patient is improving, declining, or no change.  This information will be displayed automatically on your shift  note.  Outcome: Progressing  Goal: Patient-Specific Goal (Individualized)  Description: You can add care plan individualizations to a care plan. Examples of Individualization might be:  \"Parent requests to be called daily at 9am for status\", \"I have a hard time hearing out of my right ear\", or \"Do not touch me to wake me up as it startles  me\".  Outcome: Progressing  Goal: Absence of Hospital-Acquired Illness or Injury  Outcome: Progressing  Intervention: Identify and Manage Fall Risk  Recent Flowsheet Documentation  Taken 1/6/2024 0906 by Tej Rincon RN  Safety Promotion/Fall Prevention: room near nurse's station  Intervention: Prevent Infection  Recent Flowsheet Documentation  Taken 1/6/2024 0906 by Tej Rincon RN  Infection Prevention:   equipment surfaces disinfected   hand hygiene promoted  Goal: Optimal Comfort and Wellbeing  Outcome: Progressing  Intervention: Provide Person-Centered Care  Recent Flowsheet Documentation  Taken 1/6/2024 0906 by Tej Rincon RN  Trust Relationship/Rapport:   care explained   choices provided  Goal: Readiness for Transition of Care  Outcome: Progressing     Problem: Diarrhea  Goal: Effective Diarrhea Management  Outcome: Progressing  Intervention: Manage Diarrhea  Recent Flowsheet Documentation  Taken 1/6/2024 0906 by Tej Rincon RN  Isolation Precautions: enteric precautions maintained     "

## 2024-01-06 NOTE — PLAN OF CARE
"Problem: Artificial Airway  Goal: Effective Communication  Outcome: Progressing  Goal: Optimal Device Function  Outcome: Progressing  Intervention: Optimize Device Care and Function  Recent Flowsheet Documentation  Taken 12/24/2023 1322 by Allison Casarez RT  Airway Safety Measures: all equipment/monitors on and audible  Taken 12/24/2023 1203 by Allison Casarez RT  Airway Safety Measures: all equipment/monitors on and audible  Taken 12/24/2023 0720 by Allison Casarez RT  Airway Safety Measures: all equipment/monitors on and audible  Goal: Absence of Device-Related Skin or Tissue Injury  Outcome: Progressing   Goal Outcome Evaluation:       RT PROGRESS NOTE   [id number]  DATA:     CURRENT SETTINGS:             TRACH TYPE / SIZE: #6 Bivona downsized from #6 Shiley on 12/22/23             MODE: TM 25% 30L/ Cap 1L NC             FIO2:        ACTION:             THERAPIES: Albuterol neb PRN             SUCTION:                           FREQUENCY:  none                        AMOUNT:                           CONSISTENCY:                           COLOR:                SPONTANEOUS COUGH EFFORT/STRENGTH OF EFFORT (not elicited by suctioning):                               WEANING PHASE:3, re-started on 1/2/24                          WEAN MODE:   Cap 1L NC for 2 hr, o/wise cont on 25% 30L TM/PMV   Pt was Capped for 40 hrs since 930 a.m. on1/5 till 140a.m. on 1/6 when her SOB increased, she had \"not very easy breathing\" all the time Capped. Pt was placed on TM 30% 30L last noc.Night RT stated that it was back pressure when he removed the Cap.       Capping restarted this a.m but in 2 hrs pt stated that she was continuously SOB and tried overcome it. O: BS were clear, no stridor, but voce was soft and she talked with an effort. Pt was placed on TM again. Tried on 21% - 89%, incr to 25%                         WEAN TIME:                           END WEAN REASON: Plan -      RESPONSE:             BS:  crs to clr            " covid swab essential       VITAL SIGNS: RR 18-24, HR 59-65, Sats 89-99%               EMOTIONAL NEEDS / CONCERNS:                  RISK FOR SELF DECANNULATION:                          RISK DUE TO:                          INTERVENTION/S IN PLACE IS/ARE:         NOTE / PLAN:   Goal Outcome Evaluation:    VBG  before placing on TM 7.43  56  31  35  57%     Cont to monitor and treat

## 2024-01-07 NOTE — PLAN OF CARE
"Problem: Artificial Airway  Goal: Effective Communication  Outcome: Progressing  Goal: Optimal Device Function  Outcome: Progressing  Goal: Absence of Device-Related Skin or Tissue Injury  Outcome: Progressing        RT PROGRESS NOTE     DATA:     CURRENT SETTINGS: AC/18/390/+5/30% (back up setting)             TRACH TYPE / SIZE:  #6 Bivona changed on 12/22/23             MODE:  TM/PMV             FIO2:   25% ans 30 lpm      ACTION:             THERAPIES:   PRN             SUCTION: none                         FREQUENCY:                           AMOUNT:                          CONSISTENCY:                          COLOR:               SPONTANEOUS COUGH EFFORT/STRENGTH OF EFFORT (not elicited by suctioning): Yes:strong                              WEANING PHASE:   #3                        WEAN MODE: Pt did not tolerate capping per day shift RT                         WEAN TIME:                           END WEAN REASON:        RESPONSE:             BS:   Clear/diminish             VITAL SIGNS: BP (!) 158/72 (BP Location: Left arm, Patient Position: Semi-Hitchcock's, Cuff Size: Adult Regular)   Pulse 62   Temp 98.5  F (36.9  C) (Oral)   Resp 22   Ht 1.613 m (5' 3.5\")   Wt 73.8 kg (162 lb 11.2 oz)   SpO2 95%   BMI 28.37 kg/m                 EMOTIONAL NEEDS / CONCERNS:  N/A                RISK FOR SELF DECANNULATION:  N/A                        RISK DUE TO:                          INTERVENTION/S IN PLACE IS/ARE:  N/A       NOTE / PLAN: Pt doing well on TM + PM, no respiratory distress noted. Will continue to monitor                   "

## 2024-01-07 NOTE — PLAN OF CARE
Problem: Anxiety Signs/Symptoms  Goal: Optimized Energy Level (Anxiety Signs/Symptoms)  Outcome: Progressing     Problem: Diarrhea  Goal: Effective Diarrhea Management  Outcome: Progressing   Goal Outcome Evaluation:       Atarax prn dose given per patient request for anxiety. Patient has been pleasant throughout the shift. Used the bedside commode once to void. No complaints of pain. Tubigrip on right leg removed per patient request. On the recliner since start of the shift.

## 2024-01-07 NOTE — PROGRESS NOTES
[facility]    Medicine Progress Note - Hospitalist Service    Date of Admission:  12/15/2023    Brief summary:  Priya Funez is a 83 year old female with a PMHx of HFpEF, severe TR, permanent AF s/p AVN ablation with PPM implant 1/11/19 s/p extraction TV (transvenous) PPM and implant leadless PPM 7/31/23, emergent aortic dissection repair 1/4/19, HTN, CKD, NEDA, and obesity who presented to [facility] as an outpatient for elective TVR done on 11/28/2023.  Immediate postoperative course was complicated by acute hypoxic and hypercapnic respiratory failure requiring BiPAP and probable pneumonia and treated with 7 days of IV Zosyn, patient was transferred to surgical telemetry floor on 12/2/2020.  On 12/6/2023 she was transferred back to CVICU after RRT for acute hypoxic respiratory distress and subsequent respiratory arrest with short CPR, re-intubation, and ROSC achieved. She sustained mildly displaced and nondisplaced right 3-5 rib fractures with associated right chest wall hematoma and nondisplaced anterolateral left 4th and 5th rib fractures. She was treated with meropenem 7 day course. She also has hx of recent C diff and multiple loose stools, for which she completed a 7 day po vancomycin course (per antimicrobial stewardship team, more likely to be colonization with recent history and no apparent treatment). Patient was transiently hyperglycemic and treated with insulin infusion then transitioned to sliding scale insulin per protocol. Blood sugars remained stable, has not required insulin coverage.  She remained intubated in the CVICU until undergoing tracheostomy by Dr. Pitts on 12/13/23, doing well with trach dome and PS currently.  She was treated for volume overload with diuretics (Bumex and chlorothiazide) with subsequent hypernatremia and hypotension requiring FWF and pressor support.  Spironolactone, torsemide and metoprolol discontinued and midodrine started with now stable blood pressures. Pre-op weight 70.9 kg,  discharge weight 74.1 kg (163 lbs) on 12/15/2023.  Patient was discharged to LTACH 12/15/2023 for continuing vent weaning, therapies      LTACH course:  12/17: Vitals stable.  Scheduled midodrine discontinued with BP stable in the 140s.  Diuretic therapy with torsemide restarted given peripheral edema and volume status, however at lower dose of 10 mg daily (home dose 40 mg daily) titrate slowly based on BP response.  Resume metoprolol for A-fib rate control as well gradually.  Labs reviewed, stable electrolytes, creatinine 0.82. ProBNP 3710, down from 73272 and 89900.  Hgb 8.0 from 7.6.  INR 0.98 from 1.12.  Continue heparin and Coumadin together for now until INR therapeutic.  Started hydroxyzine for anxiety 12/17.  A.m. labs ordered.       12/18-12/24:  increased torsemide to 20 mg po daily due to peripheral edema on 12/18, having loose stools,  changed tube feeds to higher fiber formula and add imodium daily and prn.  Still with edema,  increase toresemide to 40 mg po daily on 12/19 and increased imodium to bid and prn, changed heparin to lovenox for pt comfort.  She is getting prn hydroxyzine for anxiety which we should try to titrate off as anticoholinergics are not optimal in the geriatric population. 12/22 switched to bumex 1 mg daily and increased to bumex 1 mg BID 12/23.  Encouraged leg elevation above heart. Give hydrochlorothiazide one dose 12/24 - if that doesn't help then would need to try IV diuretics.  INR above 2 for the first time on 12/24 so if above 2 on 12/25 then could stop the lovenox.    12/25 - 12/29.  Had extensive lower extremity edema.  Switched Bumex to IV and then increased it from Bumex 1 mg twice daily to Bumex 1.5 mg twice daily.  Has been diuresing well.  Developed dermatitis on the lower extremities.  Started hydrocortisone cream.  12/27, VBG revealed hypercapnia she was put back on the mechanical ventilator with improvement.  12/29. Continues to diurese well. Plan to monitor kidney  function with creatinine.  She remains on vent weaning phase 2.  Making some progress.    12/30-1/7:  increased bumex to 2 mg IV bid- changed on 12/30, some erythema, will monitor - may not  need to treat for cellulitis.  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  Increased urinary output, decreased swelling of LE.  On 1/1,  spoke with patient and she would like to be DNR.  She will go through with the swallow test tomorrow but has decided she no longer will have NJ tube in and will eat no matter what the test results are.  She agrees to continue with NJ tube tube until tomorrow.  She reports that her legs feel better.  On 1/2:  NJ discontinued, passed swallow test, diet changed to bite sized food and thin liquids  1/3:  walking around nursing station with PT and walker and 2L of O2 with trach capped, doing well, in much better spirits today.  Reports stool is getting more thick and wants to be off scheduled imodium, it was discontinued, she still has prn ordered.  Improved LE swelling and increased creatinine- decreased bumex to 1.5 IV bid.  1/6: still with LE edema despite aggressive IV diuresis.  Encouraged ambulation and leg elevation.  Weight plateauing - added metolazone for 2 doses and changed dosing times of bumex.  Decreased hydroxyzine dosing to minimize anticholinergic effect in elderly - especially with diuresis and wanting to avoid urinary retention.  Recheck BUN/creat/lytes on 1/8.    Assessment & Plan   Acute respiratory failure and arrest on 12/6/23   Acute hypercapnic and hypoxic respiratory failure requiring BiPAP  s/p tracheostomy 12/13/23  s/p Mechanical ventilation  Aspiration pneumonitis vs pneumonia  Possible aspiration event 12/1  Near complete plugging of left lower lobe on CT   Acute mildly displaced and nondisplaced right 3-5 rib fractures with associated right chest wall hematoma and  nondisplaced anterolateral left fourth and fifth rib fractures  - weaning per pulmonology  -RT for tracheostomy cares, oxygenation, nebs, pulmonary therapies  - Tracheostomy sutures removed (12/18/2023)  - david Curry w/ RT   - SLP speaking valve as tolerated      S/p respiratory arrest 12/6  Hx of severe TR s/p TVR  HFpEF  Hx of Afib s/p AVN ablation - PPM, upgraded to leadless 7/2023  Hypertension  Hyperlipidemia  Hx of aortic dissection repair 2019  Respiratory arrest 12/6   - Stable paced rhythm with underlying atrial flutter. On review of cardiology, underlying aflutter/afib has been present since at least June of this year. Device RN adjusted PPM to PTA settings rate 60 at [facility].   - Pre-op 9/8/23 echo: LVEF 55/60%, mildly dilated RV, normal function  - Echo 12/4 with LVEF 60-65%, normal RV function, TV mean gradient 5 mmHg, no TR  - Echo 12/7 with no significant change from previous with the exception of mild reduction of RV. RV free wall dyskinesia.   - ASA 81 mg daily  -was on scheduled midodrine 10 mg Q8H but stopped that 12/16/23 since BP in the 140s systolic and stable  -Continue midodrine 5 mg TID PRN for SBP<100   - Started Warfarin 12/15.    -Discontinued lovenox 12/26/2023. Continue with coumadin INR on goal. Pharmacy consulted for Coumadin dosing (INR goal 2-3)      Acute on chronic diastolic heart failure with Elevated BNP, improving   - Held PTA spironolactone, torsemide, metoprolol due to hypotension at prior hospital  - Echo 12/4 with LVEF 60-65%, normal RV function, TV mean gradient 5 mmHg, no TR  - Echo 12/7 with no significant change from previous with the exception of mild reduction of RV. RV free wall dyskinesia.   - FLUID STATUS: Pre-op weight 70.9 kg, discharge weight 74.1 kg (163 lbs) on 12/15/2023 and 165 lbs on 12/16  -increased torsemide to 40 mg po daily on 12/19 then switched to bumex 12/22 and increased to bumex 1 mg BID on 12/23- increased to 1.5, now increased bumex to 2 mg  IV bid on 12/30- decreased to 1.5 mg IV bid on 1/4 but now weight was plateauing so moved dosing times of bumex and gave one dose metolazone 1/7 AM 1 hour prior to bumex and repeat 1/8.  -proBNP trend appears improved from a peak of 85518 to now 3996 1/5  -has a new blister on her right foot likely due to fluid retention  -edema improving with leg elevation above heart and encouraged ambulation      blister on top of Right foot  -  likely due to fluid retention  -blister popped on 12/30  -monitor for cellulitis    Multiple loose stools   Hx of C. Difficile (9/2023)  Severe malnutrition in the context of acute illness  - s/p tube feeds   -passed swallow on 1/2, diet changed from tube feeds to bite sized and thin liquids.    - PPI famotidine  - Bowel regimen discontinued due to continued loose stools  - Finished vancomycin po 7 day course   - PRN Zofran  4 mg PO or IV Q6H  - PRN Compazine 5 mg IV Q6H   - loperamide 2 mg BID and prn     Hypernatremia   Hypokalemia  CKD stage 3  Lactic acidosis, resolved  IMAN on CKD, resolved   Edema  Baseline creat ~1.2-1.3   FLUID STATUS: Pre-op weight 70.9 kg, discharge weight 74.1 kg (163 lbs).   - Elevated sodium, Na 151 --> 147 on recheck, likely 2/2 aggressive diureses S/p bumex and chlorotiazide prior to transfer  -On free water flushes 30ml q4h  - I/O has been inaccurate due to unmeasured stools and voids.  - Replete lytes per protocol  - Avoid/limit nephrotoxins as able  - Strict I/O, daily weights, avoid/limit nephrotoxins     Stress induced hyperglycemia  T2DM  Hgb A1c 5.7%, stable off meds  - PTA Jardiance on hold, resume once medically optimized   - Goal BG <180 for optimal healing     Stress induced leukocytosis  Probable aspiration pneumonitis vs pneumonia, resolved  - 11/30 Zosyn 7-day course for pneumonia (finished)   - 12/6 Given further decompensation, Vanco and meropenem was started on 12/6   - Finished meropenem 7 day course (12/12)   - Finished Vancomycin PO 7 day  course (for c-diff) (12/13)  - Blood culture with NGTD   - Urine with Candida and sputum with Yeast, likely both colonizations.   - Urine culture with NGTD     Hx of recent C. Difficile  - C. Difficile toxin PCR positive 12/3 with negative antigen and negative toxin - per antimicrobial stewardship team, more likely to be colonization with recent history and no apparent treatment  - Finished oral vancomycin course on 12/13, still with diarrhea but no abd pain, leucocytosis, or fevers so monitoring for now  - Monitor fever curve, WBC, and inflammatory markers as appropriate  - Enteric precautions per infection prevention protocol      3rd, 4th right rib fractures due to CPR  -Continue mini thoracotomy precautions  -Judicious use of narcotics, de-escalate as able     Acute blood loss anemia  - Hgb has been stable.   -Hgb 7.6 on 12/16 and 8.0 on 12/17/2023  -Iron studies: Serum iron 29, iron binding capacity 216, saturation index 13%.  -Follow CBC with weekly labs.   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   -f/up outpt     Diet: Calorie Counts  Snacks/Supplements Adult: Ensure Clear; With Meals  Snacks/Supplements Adult: Gelatein Plus; With Meals  Snacks/Supplements Adult: Magic Cup; With Meals  Regular Diet Adult    DVT Prophylaxis: warfarin  Barrientos Catheter: Not present  Lines: PRESENT      PICC 12/07/23 Triple Lumen Right Basilic ok to use PICC-Site Assessment: WDL      Cardiac Monitoring: None  Code Status: No CPR- Do NOT Intubate      Clinically Significant Risk Factors        # Hypokalemia: Lowest K = 3.2 mmol/L in last 2 days, will replace as needed   #  "Hypocalcemia: Lowest iCa = 1.12 mg/dL in last 2 days, will monitor and replace as appropriate     # Hypoalbuminemia: Lowest albumin = 2.7 g/dL at 1/4/2024  5:50 AM, will monitor as appropriate     # Hypertension: Noted on problem list    # Acute heart failure with preserved ejection fraction: heart failure noted on problem list, last echo with EF >50%, and receiving IV diuretics       # Overweight: Estimated body mass index is 28.37 kg/m  as calculated from the following:    Height as of this encounter: 1.613 m (5' 3.5\").    Weight as of this encounter: 73.8 kg (162 lb 11.2 oz).   # Severe Malnutrition: based on nutrition assessment      # Financial/Environmental Concerns: none   # Pacemaker present  # History of CABG: noted on surgical history       Disposition Plan     Expected Discharge Date: 01/15/2024      Destination: home with family;foster/protective services            Robert Downey MD  Hospitalist Service  LTACH  Securely message with i[employer] (more info)  Text page via [employer] Paging/Directory   ______________________________________________________________________    Interval History   Feels about the same but leg edema is some better with leg elevation above heart.  Wants to walk.    No chest pain, + dyspnea with exertion     Physical Exam   Vital Signs: Temp: 98.5  F (36.9  C) Temp src: Oral BP: (!) 158/72 Pulse: 60   Resp: 20 SpO2: 94 % O2 Device: Trach dome Oxygen Delivery: 30 LPM  Weight: 162 lbs 11.2 oz    GENERAL: no respiratory distress  HEENT: Head is normocephalic atraumatic eyes pupils appear equal round and reactive to light  NECK: Supple, Tracheostomy in place  LUNGS: clear to auscultation, decreased breath sounds at bases   HEART: S1 S2, Rate and rhythm is regular. No murmurs, 2+ BLE edema  ABDOMEN: Soft, nontender, no distension. Bowel sounds are positive. No guarding or rebound.  :  stool coming out of vagina    EXTREMITIES: 2+ bilateral lower extremity edema noted   SKIN: popped blister R " foot, erythema improved of b/l lower extremities   PSYCHIATRIC: Normal affect and cognition     Medical Decision Making       55 MINUTES SPENT BY ME on the date of service doing chart review, history, exam, documentation & further activities per the note.      Data   Lab Results   Component Value Date    WBC 6.1 12/28/2023    WBC 5.3 06/14/2021     Lab Results   Component Value Date    RBC 2.64 12/28/2023    RBC 4.33 06/14/2021     Lab Results   Component Value Date    HGB 7.8 12/28/2023    HGB 12.8 06/14/2021     Lab Results   Component Value Date    HCT 25.9 12/28/2023    HCT 40.1 06/14/2021     Lab Results   Component Value Date    MCV 98 12/28/2023    MCV 93 06/14/2021     Lab Results   Component Value Date    MCH 29.5 12/28/2023    MCH 29.6 06/14/2021     Lab Results   Component Value Date    MCHC 30.1 12/28/2023    MCHC 31.9 06/14/2021     Lab Results   Component Value Date    RDW 17.9 12/28/2023    RDW 13.5 06/14/2021     Lab Results   Component Value Date     12/28/2023     06/14/2021       Last Comprehensive Metabolic Panel:  Sodium   Date Value Ref Range Status   01/05/2024 138 135 - 145 mmol/L Final     Comment:     Reference intervals for this test were updated on 09/26/2023 to more accurately reflect our healthy population. There may be differences in the flagging of prior results with similar values performed with this method. Interpretation of those prior results can be made in the context of the updated reference intervals.    06/23/2021 138 133 - 144 mmol/L Final     Sodium POCT   Date Value Ref Range Status   01/06/2024 138 135 - 145 mmol/L Final     Potassium   Date Value Ref Range Status   12/13/2022 3.8 3.4 - 5.3 mmol/L Final   06/23/2021 4.5 3.4 - 5.3 mmol/L Final     Potassium POCT   Date Value Ref Range Status   01/06/2024 3.2 (L) 3.5 - 5.0 mmol/L Final     Chloride   Date Value Ref Range Status   01/05/2024 95 (L) 98 - 107 mmol/L Final   12/13/2022 99 94 - 109 mmol/L Final    06/23/2021 104 94 - 109 mmol/L Final     Carbon Dioxide   Date Value Ref Range Status   06/23/2021 31 20 - 32 mmol/L Final     Carbon Dioxide (CO2)   Date Value Ref Range Status   01/05/2024 37 (H) 22 - 29 mmol/L Final   12/13/2022 33 (H) 20 - 32 mmol/L Final     Anion Gap   Date Value Ref Range Status   01/05/2024 6 (L) 7 - 15 mmol/L Final   12/13/2022 7 3 - 14 mmol/L Final   06/23/2021 3 3 - 14 mmol/L Final     Glucose   Date Value Ref Range Status   12/13/2022 84 70 - 99 mg/dL Final   06/23/2021 86 70 - 99 mg/dL Final     GLUCOSE BY METER POCT   Date Value Ref Range Status   12/30/2023 101 (H) 70 - 99 mg/dL Final     Glucose Whole Blood POCT   Date Value Ref Range Status   01/06/2024 100 70 - 125 mg/dL Final     Urea Nitrogen   Date Value Ref Range Status   01/05/2024 24.0 (H) 8.0 - 23.0 mg/dL Final   12/13/2022 31 (H) 7 - 30 mg/dL Final   06/23/2021 30 7 - 30 mg/dL Final     Creatinine   Date Value Ref Range Status   01/05/2024 1.15 (H) 0.51 - 0.95 mg/dL Final   06/23/2021 1.29 (H) 0.52 - 1.04 mg/dL Final     GFR Estimate   Date Value Ref Range Status   01/05/2024 48 (L) >60 mL/min/1.73m2 Final   06/23/2021 39 (L) >60 mL/min/[1.73_m2] Final     Comment:     Non  GFR Calc  Starting 12/18/2018, serum creatinine based estimated GFR (eGFR) will be   calculated using the Chronic Kidney Disease Epidemiology Collaboration   (CKD-EPI) equation.       Calcium   Date Value Ref Range Status   01/05/2024 8.9 8.8 - 10.2 mg/dL Final   06/23/2021 9.2 8.5 - 10.1 mg/dL Final     Bilirubin Total   Date Value Ref Range Status   01/04/2024 0.7 <=1.2 mg/dL Final   04/04/2019 1.0 0.2 - 1.3 mg/dL Final     Alkaline Phosphatase   Date Value Ref Range Status   01/04/2024 102 40 - 150 U/L Final     Comment:     Reference intervals for this test were updated on 11/14/2023 to more accurately reflect our healthy population. There may be differences in the flagging of prior results with similar values performed with this  method. Interpretation of those prior results can be made in the context of the updated reference intervals.   04/04/2019 104 40 - 150 U/L Final     ALT   Date Value Ref Range Status   01/04/2024 9 0 - 50 U/L Final     Comment:     Reference intervals for this test were updated on 6/12/2023 to more accurately reflect our healthy population. There may be differences in the flagging of prior results with similar values performed with this method. Interpretation of those prior results can be made in the context of the updated reference intervals.     06/14/2021 22 0 - 50 U/L Final     AST   Date Value Ref Range Status   01/04/2024 22 0 - 45 U/L Final     Comment:     Reference intervals for this test were updated on 6/12/2023 to more accurately reflect our healthy population. There may be differences in the flagging of prior results with similar values performed with this method. Interpretation of those prior results can be made in the context of the updated reference intervals.   04/04/2019 27 0 - 45 U/L Final

## 2024-01-07 NOTE — PLAN OF CARE
Problem: Anxiety Signs/Symptoms  Goal: Optimized Energy Level (Anxiety Signs/Symptoms)  Outcome: Progressing  Goal: Improved Mood Symptoms (Anxiety Signs/Symptoms)  Intervention: Optimize Emotion and Mood  Recent Flowsheet Documentation  Taken 1/7/2024 0112 by Nikhil Jameson Jr RN  Supportive Measures: active listening utilized  Goal: Enhanced Social, Occupational or Functional Skills (Anxiety Signs/Symptoms)  Intervention: Promote Social, Occupational and Functional Ability  Recent Flowsheet Documentation  Taken 1/7/2024 0112 by Nikhil Jameson Jr RN  Trust Relationship/Rapport:   care explained   choices provided     Patient is alert and oriented, cooperative with cares. Patient`s blood pressure at midnight was 158/72, with some anxiety noted, PRN Atarax was given, and patient was able to sleep back thereafter.

## 2024-01-07 NOTE — PLAN OF CARE
Problem: Artificial Airway  Goal: Effective Communication  Outcome: Progressing  Goal: Optimal Device Function  Outcome: Progressing  Intervention: Optimize Device Care and Function  Recent Flowsheet Documentation  Taken 12/31/2023 1515 by Elias Donald, RT  Airway Safety Measures: all equipment/monitors on and audible  Taken 12/31/2023 1100 by Elias Donald, RT  Airway Safety Measures: all equipment/monitors on and audible  Taken 12/31/2023 0727 by Elias Donald, RT  Airway Safety Measures: all equipment/monitors on and audible  Goal: Absence of Device-Related Skin or Tissue Injury  Outcome: Progressing   RT PROGRESS NOTE     DATA:     CURRENT SETTINGS:ac/14/400/+5, sb             TRACH TYPE / SIZE:  #5 bivona 1/8             MODE:   cap as marbella             FIO2:        ACTION:             THERAPIES:   flutter valve bid             SUCTION:                           FREQUENCY:                           AMOUNT:                           CONSISTENCY:                           COLOR:                SPONTANEOUS COUGH EFFORT/STRENGTH OF EFFORT (not elicited by suctioning):                               WEANING PHASE:  3                        WEAN MODE:    cap/1L                        WEAN TIME:   cont                        END WEAN REASON:  cont      RESPONSE:             BS:   clear             VITAL SIGNS:   SPO2 94-97%. RR 18-22             EMOTIONAL NEEDS / CONCERNS:                  RISK FOR SELF DECANNULATION:                          RISK DUE TO:                          INTERVENTION/S IN PLACE IS/ARE:         NOTE / PLAN:   Goal Outcome Evaluation:  Patient remains on 1L Oxygen with trach capped , tolerating well, SPO2 94-97%, RR 18-24, BS clear.  Plan-   Check ABG in the morning and if stable, remains capped will plan to decannulate

## 2024-01-08 NOTE — PLAN OF CARE
Problem: Anxiety Signs/Symptoms  Goal: Optimized Energy Level (Anxiety Signs/Symptoms)  Outcome: Progressing  Goal: Improved Mood Symptoms (Anxiety Signs/Symptoms)  Intervention: Optimize Emotion and Mood  Recent Flowsheet Documentation  Taken 1/8/2024 0008 by Nikhil Jameson Jr, RN  Supportive Measures: active listening utilized  Goal: Enhanced Social, Occupational or Functional Skills (Anxiety Signs/Symptoms)  Intervention: Promote Social, Occupational and Functional Ability  Recent Flowsheet Documentation  Taken 1/8/2024 0008 by Nikhil Jameson Jr, RN  Trust Relationship/Rapport:   care explained   choices provided     Patient is alert and oriented x 4, able to let needs known as usual. Anxiety and distress not noted. Patient has been able to tolerate trach dome. Patient denied pain and able to transfer with staff supervision from bed to commode and vice versa steadily.  Patient slept on and off the whole night. Vital signs:  Temp: 98.2  F (36.8  C) Temp src: Oral BP: (!) 142/63 Pulse: 60   Resp: 18 SpO2: 94 % O2 Device: Trach dome Oxygen Delivery: 30 LPM

## 2024-01-08 NOTE — PLAN OF CARE
Problem: Artificial Airway  Goal: Effective Communication  Outcome: Progressing  Goal: Optimal Device Function  Outcome: Progressing  Intervention: Optimize Device Care and Function  Recent Flowsheet Documentation  Taken 12/24/2023 1322 by Allison Casarez RT  Airway Safety Measures: all equipment/monitors on and audible  Taken 12/24/2023 1203 by Allison Casarez RT  Airway Safety Measures: all equipment/monitors on and audible  Taken 12/24/2023 0720 by Allison Casarez RT  Airway Safety Measures: all equipment/monitors on and audible  Goal: Absence of Device-Related Skin or Tissue Injury  Outcome: Progressing   Goal Outcome Evaluation:       RT PROGRESS NOTE   [id number]  DATA:     CURRENT SETTINGS:             TRACH TYPE / SIZE: #5 Bivona, downsized from #6 Bivona on 1/208/24             MODE: TM 25% 30L/ Cap 1L NC             FIO2:        ACTION:             THERAPIES: Albuterol neb PRN             SUCTION:                           FREQUENCY:  none                        AMOUNT:                           CONSISTENCY:                           COLOR:                SPONTANEOUS COUGH EFFORT/STRENGTH OF EFFORT (not elicited by suctioning):                               WEANING PHASE:3, re-started on 1/2/24                          WEAN MODE:   Cap 1L NC since 1150a.m. after downsizing to #5 Bivona, marbella well                        WEAN TIME:                           END WEAN REASON:      RESPONSE:             BS:  crs to clr              VITAL SIGNS: RR 18-24, HR 59-65, Sats 89-99%               EMOTIONAL NEEDS / CONCERNS:                  RISK FOR SELF DECANNULATION:                          RISK DUE TO:                          INTERVENTION/S IN PLACE IS/ARE:         NOTE / PLAN:   Goal Outcome Evaluation:  Cont with Phase 3     Cont to monitor and treat

## 2024-01-08 NOTE — CARE CONFERENCE
Care progression meeting in patient's room from 2-235 pm.    Family Present:  , Genaro, and daughter, Susie    Staff Present:  Michael - PT; Cathi - ST; Angelita - HEMAL; Inge - OT; Dr Clarissa Ndiaye and Caroline - RN CC.    PT:  Michael, Physical therapist, shared that patient is doing pretty well. She has been working with Michael and Swapna 2 x day most days.  Patient able to walk with a 4WW - she has been able to ambulate up to 350' with walker.  She is independent getting in and out of bed.  She does have some lower extremity edema and has been wearing tubigrip on left leg and she has a blister on right foot - which is wrapped with kerlix. Family asked if patient can go home?  PT said that he anticipates that patient will be able to discharge to home.     ST: Cathi, Speech Therapist reported that David has been working with patient but he is off today.  Patient did get a smaller trach today, and patient reports that it feels better.  Patient had a video swallow study a week ago and is cleared for small sips with liquids and soft and bite sized foods.  She asked if there were any questions?  None asked.    RD:  Angelita, Registered Dietician, shared that patient had an  NG feeding tube when she first got here.  It was taken out last week after her swallow study and patient was very happy about that.  Angelita said with the tube feeds, she was getting enough protein and nutrients, and that patient needs to be careful to get enough protein and calories orally now.  She did have calorie counts over the weekend, and one of the days she only had 700 kcals in, which isn't enough.  HEMAL encouraged patient to be sure to order meat and mashed potatoes with extra gravy or sauces to make it easier to swallow.  Patient's daughter suggested cottage cheese - which isn't available on menu, but family will bring in.  Patient likes pears and cottage cheese.  Angelita also suggested patient order meats, eggs, beans and Greek yogurt so she gets more  protein.    OT: Inge, Occupational Therapist stated that patient has been working more with other therapist, Mary Ann, but that patient is independent with her morning routine, except she still needs some help with lower body dressing.  Gets tired with am cares - so a goal will be to increase patient's endurance in the next week.  Inge reviewed that patient had some cognitive testing with Mary Ann and ACE was 74/100 which is a bit below the normal range (82/100).  OT plans to do a shower with patient this week, now that her trach is capped.  OT did encourage family and other disciplines to use handouts with pictures to reinforce teaching.  She also instructed family to be a second set of eyes at home, as patient usually did her own med set up prior to hospitalization, just to be sure that they are set up correctly.     MD:  Dr Ndiaye reviewed the changes that happened today: trach was downsized to a 5, will see how that goes.  Felix Gutierrezm NP, said patient could be off oxygen and sats are 97% at rest currently.Patient is stable currently, fluid status has improved over the weekend - patient received some metalozone over the weekend (a diuretic) and she is on bumex now, (another diuretic.)  MD said that patient's chest x-ray shows improvement, less atelectasis.  Dr Ndiaye demonstrated how patient should be using incentive spirometer several times daily.  MD also said that patient probably was having more trouble getting deep breaths with her rib fractures.  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  Patient declined this offer - she said no more surgery.  MD explained the importance of keeping area very clean to prevent infection.  MD did also discuss home safety with patient and family; she told them to remove throw rugs and remove any other trip hazards.  She said patient has been treated for both pneumonia and c.diff and both are  resolved now.  said family should consider home health for a few weeks and also to increase services in the home, as patient won't be able to do much heavy housework.  Family did ask if patient would need home oxygen?  Dr Ndiaye said that they would check for this 48 hours prior to patient going home.  She would have to qualify for home O2.      RN CC:  This writer did tell family that we will start looking for home care for patient - she qualifies for home care due to the med changes and the blister on her right foot.  Did explain that patient has to be home bound to qualify for services.  Discussed that patient can leave home, but if it takes her a considerable and taxing effort to do so, then she is considered home bound.    Caroline Willett RN, BSN  Care Coordination  [facility]  [phone]

## 2024-01-08 NOTE — PROGRESS NOTES
REHAB CARE PROGRESSION MEETING:    Medical Team Conference:  PT present for 15 minutes.  See progress noted dated today ashley by RN Care Coordinator for details. Patient and family were present for conference.       Benjamin Blankenship, PT, WCC, CLT

## 2024-01-08 NOTE — PROGRESS NOTES
Calorie Count  Intake recorded for: 1/5  Total Kcals: 712 Total Protein: 21g  # Meals Ordered from Kitchen: 3  # Meals Recorded: 3   B: 8 oz. Orange juice, 75% scrambled eggs and applesauce   L: 100% mashed potatoes w/ gravy, 75% applesauce   D: 100% oatmeal, 50% egg salad and apple juice  # Supplements Recorded: 3 ordered, patient ate 0%    Intake recorded for: 1/6  Total Kcals: 1417 Total Protein: 32g  # Meals Ordered from Kitchen: 3  # Meals Recorded: 3   B: 100% pears and orange juice, 75% oatmeal   L: 100% mashed potatoes w/ gravy, pears, apple juice   D: 100% mashed potatoes w/ gravy, sugar cookie  # Supplements Recorded: 100% 1 Ensure Clear and 1 Magic Cup (contributed 530 kcal, 17g protein)    Intake recorded for: 1/7  Total Kcals: 1139 Total Protein: 34g  # Meals Ordered from Kitchen: 3  # Meals Recorded: 2   B: 100% 1 slice of torre, fried potatoes, orange juice   L: 75% per flowsheets   D: 100% Jell-O, apple juice, and 75% mini corn dogs  # Supplements Recorded: 3 ordered, patient states she she consumed 0%    ASSESSED NUTRITION NEEDS:  Dosing Weight: 58.9 kg adjusted weight  Estimated Energy Needs: 7176-3887 kcals/day (25 - 30 kcals/kg)  Justification: Maintenance  Estimated Protein Needs: 70-88 grams protein/day (1.2 - 1.5 grams of pro/kg)  Justification: Hypercatabolism with acute illness  Estimated Fluid Needs: 1676-5946 mL/day (1 mL/kcal)   Justification: Maintenance    Summary:  Patient consumed an average of 1089 kcal, 29g protein/day over the past 3 days. This meets 74% caloric needs and 41% protein needs (low-end estimates). Patients best PO intakes were 1/6 when she consumed 100% of 2 oral nutrition supplements. She states that she does not like Magic Cups and Gelatein Plus.    Recommendations Ordered by RD:  - Discontinue Gelatein Plus and Magic Cup - patient does not like  - Provided patient w/ printout of High-Protein Foods List and discussed daily protein goals and how to obtain this from  food.      Angelita Smith RDN, LD  Clinical Dietitian

## 2024-01-08 NOTE — PLAN OF CARE
"  Problem: Artificial Airway  Goal: Effective Communication  Outcome: Progressing  Goal: Optimal Device Function  Outcome: Progressing  Intervention: Optimize Device Care and Function  Recent Flowsheet Documentation  Taken 1/8/2024 0019 by Bear Valverde, RT  Airway Safety Measures: all equipment/monitors on and audible  Goal: Absence of Device-Related Skin or Tissue Injury  Outcome: Progressing     RT PROGRESS NOTE     DATA:     CURRENT SETTINGS:ac/14/400/+5             TRACH TYPE / SIZE:  #6 bivona 12/22             MODE:   cap days , tm/pmv at night.             FIO2:   25%     ACTION:             THERAPIES:                SUCTION:                           FREQUENCY:   x1                        AMOUNT:   small                        CONSISTENCY:   thick                        COLOR:   white              SPONTANEOUS COUGH EFFORT/STRENGTH OF EFFORT (not elicited by suctioning):                               WEANING PHASE:  2                        WEAN MODE:                         WEAN TIME:                           END WEAN REASON:  cont      RESPONSE:             BS:   crs             VITAL SIGNS:   Blood pressure (!) 142/63, pulse 60, temperature 98.2  F (36.8  C), temperature source Oral, resp. rate 18, height 1.613 m (5' 3.5\"), weight 72.9 kg (160 lb 11.2 oz), SpO2 94%, not currently breastfeeding.              EMOTIONAL NEEDS / CONCERNS:                  RISK FOR SELF DECANNULATION:                          RISK DUE TO:                          INTERVENTION/S IN PLACE IS/ARE:         NOTE / PLAN: Patient currently is on 25%/30L-TM/PMV and is tolerating well. Plan: will resume the weaning on CAP as marbella-. Please consider Trach change to # 5 Bivona.                        "

## 2024-01-08 NOTE — PROGRESS NOTES
REHAB CARE PROGRESSION MEETING:    Medical Team Conference:  SLP present for 15 minutes.  See progress noted dated today ashley by RN Care Coordinator for details. Spouse and daughter were present for conference.

## 2024-01-08 NOTE — PLAN OF CARE
Problem: Anxiety Signs/Symptoms  Goal: Optimized Energy Level (Anxiety Signs/Symptoms)  Outcome: Progressing     Problem: Diarrhea  Goal: Effective Diarrhea Management  Outcome: Progressing   Goal Outcome Evaluation:  Patient did not show manifestations of anxiety this shift. Family visited and patient was pleasant. Walked around the unit using  walker  and on 4L of 0xygen via nasal cannula, Eating mostly 100% of meals, Drinking fluids. Adequate urine output; No BM this shift.

## 2024-01-08 NOTE — PROGRESS NOTES
[facility]    Medicine Progress Note - Hospitalist Service    Date of Admission:  12/15/2023    Brief summary:  Priya Funez is a 83 year old female with a PMHx of HFpEF, severe TR, permanent AF s/p AVN ablation with PPM implant 1/11/19 s/p extraction TV (transvenous) PPM and implant leadless PPM 7/31/23, emergent aortic dissection repair 1/4/19, HTN, CKD, NEDA, and obesity who presented to [facility] as an outpatient for elective TVR done on 11/28/2023.  Immediate postoperative course was complicated by acute hypoxic and hypercapnic respiratory failure requiring BiPAP and probable pneumonia and treated with 7 days of IV Zosyn, patient was transferred to surgical telemetry floor on 12/2/2020.  On 12/6/2023 she was transferred back to CVICU after RRT for acute hypoxic respiratory distress and subsequent respiratory arrest with short CPR, re-intubation, and ROSC achieved. She sustained mildly displaced and nondisplaced right 3-5 rib fractures with associated right chest wall hematoma and nondisplaced anterolateral left 4th and 5th rib fractures. She was treated with meropenem 7 day course. She also has hx of recent C diff and multiple loose stools, for which she completed a 7 day po vancomycin course (per antimicrobial stewardship team, more likely to be colonization with recent history and no apparent treatment). Patient was transiently hyperglycemic and treated with insulin infusion then transitioned to sliding scale insulin per protocol. Blood sugars remained stable, has not required insulin coverage.  She remained intubated in the CVICU until undergoing tracheostomy by Dr. Pitts on 12/13/23, doing well with trach dome and PS currently.  She was treated for volume overload with diuretics (Bumex and chlorothiazide) with subsequent hypernatremia and hypotension requiring FWF and pressor support.  Spironolactone, torsemide and metoprolol discontinued and midodrine started with now stable blood pressures. Pre-op weight 70.9 kg,  discharge weight 74.1 kg (163 lbs) on 12/15/2023.  Patient was discharged to LTACH 12/15/2023 for continuing vent weaning, therapies      LTACH course:  12/17: Vitals stable.  Scheduled midodrine discontinued with BP stable in the 140s.  Diuretic therapy with torsemide restarted given peripheral edema and volume status, however at lower dose of 10 mg daily (home dose 40 mg daily) titrate slowly based on BP response.  Resume metoprolol for A-fib rate control as well gradually.  Labs reviewed, stable electrolytes, creatinine 0.82. ProBNP 3710, down from 29972 and 57873.  Hgb 8.0 from 7.6.  INR 0.98 from 1.12.  Continue heparin and Coumadin together for now until INR therapeutic.  Started hydroxyzine for anxiety 12/17.  A.m. labs ordered.       12/18-12/24:  increased torsemide to 20 mg po daily due to peripheral edema on 12/18, having loose stools,  changed tube feeds to higher fiber formula and add imodium daily and prn.  Still with edema,  increase toresemide to 40 mg po daily on 12/19 and increased imodium to bid and prn, changed heparin to lovenox for pt comfort.  She is getting prn hydroxyzine for anxiety which we should try to titrate off as anticoholinergics are not optimal in the geriatric population. 12/22 switched to bumex 1 mg daily and increased to bumex 1 mg BID 12/23.  Encouraged leg elevation above heart. Give hydrochlorothiazide one dose 12/24 - if that doesn't help then would need to try IV diuretics.  INR above 2 for the first time on 12/24 so if above 2 on 12/25 then could stop the lovenox.    12/25 - 12/29.  Had extensive lower extremity edema.  Switched Bumex to IV and then increased it from Bumex 1 mg twice daily to Bumex 1.5 mg twice daily.  Has been diuresing well.  Developed dermatitis on the lower extremities.  Started hydrocortisone cream.  12/27, VBG revealed hypercapnia she was put back on the mechanical ventilator with improvement.  12/29. Continues to diurese well. Plan to monitor kidney  function with creatinine.  She remains on vent weaning phase 2.  Making some progress.    12/30-1/7:  increased bumex to 2 mg IV bid- changed on 12/30, some erythema, will monitor - may not  need to treat for cellulitis.  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  Increased urinary output, decreased swelling of LE.  On 1/1,  spoke with patient and she would like to be DNR.  She will go through with the swallow test tomorrow but has decided she no longer will have NJ tube in and will eat no matter what the test results are.  She agrees to continue with NJ tube tube until tomorrow.  She reports that her legs feel better.  On 1/2:  NJ discontinued, passed swallow test, diet changed to bite sized food and thin liquids  1/3:  walking around nursing station with PT and walker and 2L of O2 with trach capped, doing well, in much better spirits today.  Reports stool is getting more thick and wants to be off scheduled imodium, it was discontinued, she still has prn ordered.  Improved LE swelling and increased creatinine- decreased bumex to 1.5 IV bid.  1/6: still with LE edema despite aggressive IV diuresis.  Encouraged ambulation and leg elevation.  Weight plateauing - added metolazone for 2 doses and changed dosing times of bumex.  Decreased hydroxyzine dosing to minimize anticholinergic effect in elderly - especially with diuresis and wanting to avoid urinary retention.     1/8:  k is 2.7, will give extra 60 meq KCL and repeat K at 2pm.  K is 3.5, will follow up in morning.  2pm we had a care conference, all questions were answered.      Assessment & Plan   Acute respiratory failure and arrest on 12/6/23   Acute hypercapnic and hypoxic respiratory failure requiring BiPAP  s/p tracheostomy 12/13/23  s/p Mechanical ventilation  Aspiration pneumonitis vs pneumonia  Possible aspiration event 12/1  Near complete plugging of left  lower lobe on CT   Acute mildly displaced and nondisplaced right 3-5 rib fractures with associated right chest wall hematoma and nondisplaced anterolateral left fourth and fifth rib fractures  - weaning per pulmonology  -RT for tracheostomy cares, oxygenation, nebs, pulmonary therapies  - Tracheostomy sutures removed (12/18/2023)  - Mucomyst, duoneb w/ RT   - SLP speaking valve as tolerated      S/p respiratory arrest 12/6  Hx of severe TR s/p TVR  HFpEF  Hx of Afib s/p AVN ablation - PPM, upgraded to leadless 7/2023  Hypertension  Hyperlipidemia  Hx of aortic dissection repair 2019  Respiratory arrest 12/6   - Stable paced rhythm with underlying atrial flutter. On review of cardiology, underlying aflutter/afib has been present since at least June of this year. Device RN adjusted PPM to PTA settings rate 60 at [facility].   - Pre-op 9/8/23 echo: LVEF 55/60%, mildly dilated RV, normal function  - Echo 12/4 with LVEF 60-65%, normal RV function, TV mean gradient 5 mmHg, no TR  - Echo 12/7 with no significant change from previous with the exception of mild reduction of RV. RV free wall dyskinesia.   - ASA 81 mg daily  -was on scheduled midodrine 10 mg Q8H but stopped that 12/16/23 since BP in the 140s systolic and stable  -Continue midodrine 5 mg TID PRN for SBP<100   - Started Warfarin 12/15.    -Discontinued lovenox 12/26/2023. Continue with coumadin INR on goal. Pharmacy consulted for Coumadin dosing (INR goal 2-3)      Acute on chronic diastolic heart failure with Elevated BNP, improving   - Held PTA spironolactone, torsemide, metoprolol due to hypotension at prior hospital  - Echo 12/4 with LVEF 60-65%, normal RV function, TV mean gradient 5 mmHg, no TR  - Echo 12/7 with no significant change from previous with the exception of mild reduction of RV. RV free wall dyskinesia.   - FLUID STATUS: Pre-op weight 70.9 kg, discharge weight 74.1 kg (163 lbs) on 12/15/2023 and 165 lbs on 12/16  -increased torsemide to 40 mg po  daily on 12/19 then switched to bumex 12/22 and increased to bumex 1 mg BID on 12/23- increased to 1.5, now increased bumex to 2 mg IV bid on 12/30- decreased to 1.5 mg IV bid on 1/4 but now weight was plateauing so moved dosing times of bumex and gave one dose metolazone 1/7 AM 1 hour prior to bumex and repeat 1/8.  -proBNP trend appears improved from a peak of 25947 to now 3996 1/5  -has a new blister on her right foot likely due to fluid retention  -edema improving with leg elevation above heart and encouraged ambulation      blister on top of Right foot  -  likely due to fluid retention  -blister popped on 12/30  -monitor for cellulitis    Multiple loose stools   Hx of C. Difficile (9/2023)  Severe malnutrition in the context of acute illness  - s/p tube feeds   -passed swallow on 1/2, diet changed from tube feeds to bite sized and thin liquids.    - PPI famotidine  - Bowel regimen discontinued due to continued loose stools  - Finished vancomycin po 7 day course   - PRN Zofran  4 mg PO or IV Q6H  - PRN Compazine 5 mg IV Q6H   - loperamide 2 mg BID and prn     Hypernatremia   Hypokalemia  CKD stage 3  Lactic acidosis, resolved  IMAN on CKD, resolved   Edema  Baseline creat ~1.2-1.3   FLUID STATUS: Pre-op weight 70.9 kg, discharge weight 74.1 kg (163 lbs).   - Elevated sodium- it is now normal  -On free water flushes 30ml q4h  - I/O has been inaccurate due to unmeasured stools and voids.  - Replete lytes per protocol  - Avoid/limit nephrotoxins as able  -daily weights    Stress induced hyperglycemia  T2DM  Hgb A1c 5.7%, stable off meds  - PTA Jardiance on hold, resume once medically optimized   - Goal BG <180 for optimal healing     Stress induced leukocytosis  Probable aspiration pneumonitis vs pneumonia, resolved  - 11/30 Zosyn 7-day course for pneumonia (finished)   - 12/6 Given further decompensation, Vanco and meropenem was started on 12/6   - Finished meropenem 7 day course (12/12)   - Finished Vancomycin PO  7 day course (for c-diff) (12/13)  - Blood culture with NGTD   - Urine with Candida and sputum with Yeast, likely both colonizations.   - Urine culture with NGTD     Hx of recent C. Difficile  - C. Difficile toxin PCR positive 12/3 with negative antigen and negative toxin - per antimicrobial stewardship team, more likely to be colonization with recent history and no apparent treatment  - Finished oral vancomycin course on 12/13, still with diarrhea but no abd pain, leucocytosis, or fevers so monitoring for now  - Monitor fever curve, WBC, and inflammatory markers as appropriate  - Enteric precautions per infection prevention protocol      3rd, 4th right rib fractures due to CPR  -Continue mini thoracotomy precautions  -Judicious use of narcotics, de-escalate as able     Acute blood loss anemia  - Hgb has been stable.   -Hgb 7.6 on 12/16 and 8.0 on 12/17/2023  -Iron studies: Serum iron 29, iron binding capacity 216, saturation index 13%.  -Follow CBC with weekly labs.   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   -f/up outpt     Diet: Snacks/Supplements Adult: Ensure Clear; With Meals  Snacks/Supplements Adult: Gelatein Plus; With Meals  Snacks/Supplements Adult: Magic Cup; With Meals  Regular Diet Adult    DVT Prophylaxis: warfarin  Barrientos Catheter: Not present  Lines: PRESENT      PICC 12/07/23 Triple Lumen Right Basilic ok to use PICC-Site Assessment: WDL      Cardiac Monitoring: None  Code Status: No CPR- Do NOT Intubate      Clinically Significant Risk Factors        # Hypokalemia: Lowest K = 2.7 mmol/L in last 2 days, will replace as needed       #  "Hypoalbuminemia: Lowest albumin = 2.7 g/dL at 1/4/2024  5:50 AM, will monitor as appropriate     # Hypertension: Noted on problem list    # Acute heart failure with preserved ejection fraction: heart failure noted on problem list, last echo with EF >50%, and receiving IV diuretics       # Overweight: Estimated body mass index is 27.18 kg/m  as calculated from the following:    Height as of this encounter: 1.613 m (5' 3.5\").    Weight as of this encounter: 70.7 kg (155 lb 14.4 oz).   # Severe Malnutrition: based on nutrition assessment      # Financial/Environmental Concerns: none   # Pacemaker present  # History of CABG: noted on surgical history       Disposition Plan     Expected Discharge Date: 01/15/2024      Destination: home with family;foster/protective services            Clarissa Ndiaye MD  Hospitalist Service  LTACH  Securely message with Anonymous You (more info)  Text page via [employer] Paging/Directory   ______________________________________________________________________    Interval History   Reports that her LE edema is much worse, feeling that she is working better with PT, feels less dyspnea with movement compared to last week     No chest pain, no abdominal pain.       Physical Exam   Vital Signs: Temp: 98.1  F (36.7  C) Temp src: Oral BP: 138/63 Pulse: 60   Resp: 21 SpO2: 96 % O2 Device: Trach dome Oxygen Delivery: 30 LPM  Weight: 155 lbs 14.4 oz    GENERAL: no respiratory distress  HEENT: Head is normocephalic atraumatic eyes pupils appear equal round and reactive to light  NECK: Supple, Tracheostomy in place  LUNGS: clear to auscultation, decreased breath sounds at bases   HEART: S1 S2, Rate and rhythm is regular. No murmurs, 2+ BLE edema  ABDOMEN: Soft, nontender, no distension. Bowel sounds are positive. No guarding or rebound.  :  stool coming out of vagina    EXTREMITIES: 2+ bilateral lower extremity edema noted   SKIN: popped blister R foot, erythema improved of b/l lower extremities "   PSYCHIATRIC: Normal affect and cognition     Medical Decision Making       66 MINUTES SPENT BY ME on the date of service doing chart review, history, exam, documentation & further activities per the note.      Data   Lab Results   Component Value Date    WBC 6.1 12/28/2023    WBC 5.3 06/14/2021     Lab Results   Component Value Date    RBC 2.64 12/28/2023    RBC 4.33 06/14/2021     Lab Results   Component Value Date    HGB 7.8 12/28/2023    HGB 12.8 06/14/2021     Lab Results   Component Value Date    HCT 25.9 12/28/2023    HCT 40.1 06/14/2021     Lab Results   Component Value Date    MCV 98 12/28/2023    MCV 93 06/14/2021     Lab Results   Component Value Date    MCH 29.5 12/28/2023    MCH 29.6 06/14/2021     Lab Results   Component Value Date    MCHC 30.1 12/28/2023    MCHC 31.9 06/14/2021     Lab Results   Component Value Date    RDW 17.9 12/28/2023    RDW 13.5 06/14/2021     Lab Results   Component Value Date     12/28/2023     06/14/2021       Last Comprehensive Metabolic Panel:  Sodium   Date Value Ref Range Status   01/08/2024 136 135 - 145 mmol/L Final     Comment:     Reference intervals for this test were updated on 09/26/2023 to more accurately reflect our healthy population. There may be differences in the flagging of prior results with similar values performed with this method. Interpretation of those prior results can be made in the context of the updated reference intervals.    06/23/2021 138 133 - 144 mmol/L Final     Potassium   Date Value Ref Range Status   01/08/2024 2.7 (L) 3.4 - 5.3 mmol/L Final   12/13/2022 3.8 3.4 - 5.3 mmol/L Final   06/23/2021 4.5 3.4 - 5.3 mmol/L Final     Potassium POCT   Date Value Ref Range Status   01/06/2024 3.2 (L) 3.5 - 5.0 mmol/L Final     Chloride   Date Value Ref Range Status   01/08/2024 91 (L) 98 - 107 mmol/L Final   12/13/2022 99 94 - 109 mmol/L Final   06/23/2021 104 94 - 109 mmol/L Final     Carbon Dioxide   Date Value Ref Range Status    06/23/2021 31 20 - 32 mmol/L Final     Carbon Dioxide (CO2)   Date Value Ref Range Status   01/08/2024 35 (H) 22 - 29 mmol/L Final   12/13/2022 33 (H) 20 - 32 mmol/L Final     Anion Gap   Date Value Ref Range Status   01/08/2024 10 7 - 15 mmol/L Final   12/13/2022 7 3 - 14 mmol/L Final   06/23/2021 3 3 - 14 mmol/L Final     Glucose   Date Value Ref Range Status   01/08/2024 87 70 - 99 mg/dL Final   12/13/2022 84 70 - 99 mg/dL Final   06/23/2021 86 70 - 99 mg/dL Final     GLUCOSE BY METER POCT   Date Value Ref Range Status   12/30/2023 101 (H) 70 - 99 mg/dL Final     Glucose Whole Blood POCT   Date Value Ref Range Status   01/06/2024 100 70 - 125 mg/dL Final     Urea Nitrogen   Date Value Ref Range Status   01/08/2024 13.1 8.0 - 23.0 mg/dL Final   12/13/2022 31 (H) 7 - 30 mg/dL Final   06/23/2021 30 7 - 30 mg/dL Final     Creatinine   Date Value Ref Range Status   01/08/2024 1.04 (H) 0.51 - 0.95 mg/dL Final   06/23/2021 1.29 (H) 0.52 - 1.04 mg/dL Final     GFR Estimate   Date Value Ref Range Status   01/08/2024 54 (L) >60 mL/min/1.73m2 Final   06/23/2021 39 (L) >60 mL/min/[1.73_m2] Final     Comment:     Non  GFR Calc  Starting 12/18/2018, serum creatinine based estimated GFR (eGFR) will be   calculated using the Chronic Kidney Disease Epidemiology Collaboration   (CKD-EPI) equation.       Calcium   Date Value Ref Range Status   01/08/2024 8.8 8.8 - 10.2 mg/dL Final   06/23/2021 9.2 8.5 - 10.1 mg/dL Final     Bilirubin Total   Date Value Ref Range Status   01/08/2024 0.7 <=1.2 mg/dL Final   04/04/2019 1.0 0.2 - 1.3 mg/dL Final     Alkaline Phosphatase   Date Value Ref Range Status   01/08/2024 112 40 - 150 U/L Final     Comment:     Reference intervals for this test were updated on 11/14/2023 to more accurately reflect our healthy population. There may be differences in the flagging of prior results with similar values performed with this method. Interpretation of those prior results can be made in  the context of the updated reference intervals.   04/04/2019 104 40 - 150 U/L Final     ALT   Date Value Ref Range Status   01/08/2024 10 0 - 50 U/L Final     Comment:     Reference intervals for this test were updated on 6/12/2023 to more accurately reflect our healthy population. There may be differences in the flagging of prior results with similar values performed with this method. Interpretation of those prior results can be made in the context of the updated reference intervals.     06/14/2021 22 0 - 50 U/L Final     AST   Date Value Ref Range Status   01/08/2024 22 0 - 45 U/L Final     Comment:     Reference intervals for this test were updated on 6/12/2023 to more accurately reflect our healthy population. There may be differences in the flagging of prior results with similar values performed with this method. Interpretation of those prior results can be made in the context of the updated reference intervals.   04/04/2019 27 0 - 45 U/L Final

## 2024-01-08 NOTE — PROGRESS NOTES
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 Admitted for elective TVR completed 11/28/2023. Subsequent respiratory arrest on 12/6. Completed multiple courses of antibiotics for presumed pneumonia  S/p trach 12/13. Transferred to LTAC 12/15. Progressed to continuous trach dome 12/20-26. Attempted 24/7 capping 12/26 with worsening hypercapnia 12/27 placed back on vent.  Suspect d/t acute on chronic volume overload, diuretics increased. Now capping 24/7 since 1/3.  Assessment:     Problems:  Acute hypercapnic/hypoxic respiratory failure s/p trach  Tracheostomy in place: 6 Bivona 12/22  Dysphagia: passed BDT. Passed VFSS on modified diet   Respiratory/cardiac arrest 12/6 possibly d/t mucus plugging  S/p TVR, HFpEF(55-60%)  IMAN, volume overload on diuretics   Anticoagulation with coumadin for A-fib and bioprosthetic heart valve  Anxiety    Recommendations/Plan:  Downsize trach to 5 bivona d/t inability to tolerate prolonged trach capping  Phase 3: cap as tolerated   Repeat VBG when she's able to tolerate continuous capping again    Repeat CxR today improved from previous: Improving RLL atelectasis, Left lung is clear   IS(500mL) and FV  Diuresis per primary.  Replace electrolytes per protocol   Monitor for s/s aspiration now that on PO diet   Routine trach care per protocol    Keep progressing towards end goal of decannulation: hopefully end of this week     Subjective:   - denies sob, pain, n/v.  Likes the smaller trach.  Leg edema improved      Tracheal secretions: scant to small amounts     Cough strength: strong    Ventilator weaning results  - continuous TM 12/20-26  - 12/26: continuous capping   - 12/27: VBG am 7.31/76, placed back on vent.  Capped later ~2hr, dyspnea,  "improved on TM/PMV.  AC night   - 12/28: Capped 3L for 20 min; sob.  40L/35%TM/PMV for 13.5hr.  AC night   -12/29-1/1: TM/PMV day(13-14hr on avg).  Vent night.    -1/2-3: Cap day, TM/PMV night   -1/4-1/5: capped continuously almost 40hr, then inc sob placed back on TM/PMV  -1/6-present: TM/PMV continuous     Clinical status discussed today with respiratory therapist       Medications:      dextrose      - MEDICATION INSTRUCTIONS -        aspirin  81 mg Oral Daily    bumetanide  1.5 mg Intravenous Q12H    escitalopram  10 mg Oral Daily    melatonin  5 mg Oral QPM    miconazole   Topical BID    mirtazapine  15 mg Oral At Bedtime    multivitamin w/minerals  1 tablet Oral Daily    pantoprazole  40 mg Oral QAM AC    potassium chloride  20 mEq Oral BID    saccharomyces boulardii  250 mg Oral BID    sodium chloride (PF)  10 mL Intracatheter Q8H    Warfarin Therapy Reminder  1 each Oral See Admin Instructions         Exam/Data:   Vitals  /63 (BP Location: Left arm)   Pulse 60   Temp 98.1  F (36.7  C) (Oral)   Resp 22   Ht 1.613 m (5' 3.5\")   Wt 70.7 kg (155 lb 14.4 oz)   SpO2 94%   BMI 27.18 kg/m       I/O last 3 completed shifts:  In: 840 [P.O.:840]  Out: 375 [Urine:375]  Weight change:     Vent Mode: Other (see comments)  FiO2 (%): 25 %  Resp: 22    Capped on room air     EXAM:  Gen: NAD in chair with trach capped   HEENT: trach midline/intact, no stridor   CV: RRR, no m/g/r  Resp: CTAB; non-labored, no wheeze  Abd: soft, nontender  Skin: b/l lower extremity rash(improved), large bulla on right dorsum(wrapped)  Ext: 1+ b/l lower extremity edema(improved)  Neuro: alert, follows commands     Labs:  Venous Blood Gas  Recent Labs   Lab 01/06/24  0138 01/04/24  0550   PHV 7.43 7.46*   PCO2V 56* 58*   PO2V 31 28   HCO3V 35* 38*   SHAWN 13.0 16.9       Complete Blood Count   Recent Labs   Lab 01/08/24  0527 01/06/24  0138 01/04/24  0550   WBC 8.3  --  8.1   HGB 8.0* 8.4* 7.5*  7.7*     --  397     Basic " Metabolic Panel  Recent Labs   Lab 01/08/24  0527 01/07/24  0527 01/06/24  0138 01/05/24  0548 01/04/24  0550     --  138 138 141  141   POTASSIUM 2.7* 3.4 3.2* 3.9 3.7  3.9   CHLORIDE 91*  --   --  95* 96*   CO2 35*  --   --  37* 39*   BUN 13.1  --   --  24.0* 30.2*   CR 1.04*  --   --  1.15* 1.17*   GLC 87  --  100 88 85  85     Liver Function Tests  Recent Labs   Lab 01/08/24  0527 01/07/24  0527 01/06/24  0638 01/05/24  0548 01/04/24  0550   AST 22  --   --   --  22   ALT 10  --   --   --  9   ALKPHOS 112  --   --   --  102   BILITOTAL 0.7  --   --   --  0.7   ALBUMIN 3.2*  --   --   --  2.7*   INR 2.89* 3.66* 3.33* 2.89* 3.95*     Coagulation Profile  Recent Labs   Lab 01/08/24  0527 01/07/24  0527 01/06/24  0638 01/05/24  0548   INR 2.89* 3.66* 3.33* 2.89*       Radiology: Personally reviewed; radiology read below    XR CHEST 2 VIEWS 1/8/2024  Feeding tube has been removed. Decreasing right lower lung atelectasis. Clear left lung. No other significant change. Tracheostomy. Right PICC tip in the low SVC. Sternotomy and mitral valve repair. Mild cardiomegaly with normal pulmonary vascularity. Moderate right hemidiaphragm elevation. No pleural effusion or pneumothorax.    XR CHEST 12/27/2023  Tracheostomy. Sternotomy and valve replacement. Enteric tube within the stomach. Right upper extremity PICC line tip in the distal SVC. Heart is mildly enlarged, unchanged. Elevation of the right hemidiaphragm, unchanged. Minimal atelectasis at the right lung base. Lungs otherwise appear clear    XR CHEST 12/19/2023  Poststernotomy changes with a prosthetic TVR and tracheostomy tube tube in place. Feeding tube can be seen coursing into the stomach. Right upper extremity PICC line catheter overlies the proximal right atrium. Elevation of the right hemidiaphragm, unchanged with small right effusion and right basilar and likely middle lobe opacities, presumed atelectasis. Left lung is clear. Heart is slightly enlarged.  No signs of failure.    Matt Kennedy CNP  Pulmonary Medicine  [facility]  Pager [phone]  Office [phone]

## 2024-01-09 NOTE — PLAN OF CARE
Speech Language Therapy Discharge Summary    Reason for therapy discharge:    All goals and outcomes met, no further needs identified.    Progress towards therapy goal(s). See goals on Care Plan in [facility] electronic health record for goal details.  Goals met    Therapy recommendation(s):    No further therapy is recommended. Recommend continue regular diet and thin liquids with safe swallow precautions. Pt to sit upright, take single sips, and use extra sauce/gravy PRN for comfort. No further SLP services warranted as swallow function is at or close to baseline and pt is tolerating diet with use of strategies independently. No further instrumental evaluation warranted. SLP will sign off.

## 2024-01-09 NOTE — PROGRESS NOTES
[facility]    Medicine Progress Note - Hospitalist Service    Date of Admission:  12/15/2023    Brief summary:  Priya Funez is a 83 year old female with a PMHx of HFpEF, severe TR, permanent AF s/p AVN ablation with PPM implant 1/11/19 s/p extraction TV (transvenous) PPM and implant leadless PPM 7/31/23, emergent aortic dissection repair 1/4/19, HTN, CKD, NEDA, and obesity who presented to [facility] as an outpatient for elective TVR done on 11/28/2023.  Immediate postoperative course was complicated by acute hypoxic and hypercapnic respiratory failure requiring BiPAP and probable pneumonia and treated with 7 days of IV Zosyn, patient was transferred to surgical telemetry floor on 12/2/2020.  On 12/6/2023 she was transferred back to CVICU after RRT for acute hypoxic respiratory distress and subsequent respiratory arrest with short CPR, re-intubation, and ROSC achieved. She sustained mildly displaced and nondisplaced right 3-5 rib fractures with associated right chest wall hematoma and nondisplaced anterolateral left 4th and 5th rib fractures. She was treated with meropenem 7 day course. She also has hx of recent C diff and multiple loose stools, for which she completed a 7 day po vancomycin course (per antimicrobial stewardship team, more likely to be colonization with recent history and no apparent treatment). Patient was transiently hyperglycemic and treated with insulin infusion then transitioned to sliding scale insulin per protocol. Blood sugars remained stable, has not required insulin coverage.  She remained intubated in the CVICU until undergoing tracheostomy by Dr. Pitts on 12/13/23, doing well with trach dome and PS currently.  She was treated for volume overload with diuretics (Bumex and chlorothiazide) with subsequent hypernatremia and hypotension requiring FWF and pressor support.  Spironolactone, torsemide and metoprolol discontinued and midodrine started with now stable blood pressures. Pre-op weight 70.9 kg,  discharge weight 74.1 kg (163 lbs) on 12/15/2023.  Patient was discharged to LTACH 12/15/2023 for continuing vent weaning, therapies      LTACH course:  12/17: Vitals stable.  Scheduled midodrine discontinued with BP stable in the 140s.  Diuretic therapy with torsemide restarted given peripheral edema and volume status, however at lower dose of 10 mg daily (home dose 40 mg daily) titrate slowly based on BP response.  Resume metoprolol for A-fib rate control as well gradually.  Labs reviewed, stable electrolytes, creatinine 0.82. ProBNP 3710, down from 54031 and 20538.  Hgb 8.0 from 7.6.  INR 0.98 from 1.12.  Continue heparin and Coumadin together for now until INR therapeutic.  Started hydroxyzine for anxiety 12/17.  A.m. labs ordered.       12/18-12/24:  increased torsemide to 20 mg po daily due to peripheral edema on 12/18, having loose stools,  changed tube feeds to higher fiber formula and add imodium daily and prn.  Still with edema,  increase toresemide to 40 mg po daily on 12/19 and increased imodium to bid and prn, changed heparin to lovenox for pt comfort.  She is getting prn hydroxyzine for anxiety which we should try to titrate off as anticoholinergics are not optimal in the geriatric population. 12/22 switched to bumex 1 mg daily and increased to bumex 1 mg BID 12/23.  Encouraged leg elevation above heart. Give hydrochlorothiazide one dose 12/24 - if that doesn't help then would need to try IV diuretics.  INR above 2 for the first time on 12/24 so if above 2 on 12/25 then could stop the lovenox.    12/25 - 12/29.  Had extensive lower extremity edema.  Switched Bumex to IV and then increased it from Bumex 1 mg twice daily to Bumex 1.5 mg twice daily.  Has been diuresing well.  Developed dermatitis on the lower extremities.  Started hydrocortisone cream.  12/27, VBG revealed hypercapnia she was put back on the mechanical ventilator with improvement.  12/29. Continues to diurese well. Plan to monitor kidney  function with creatinine.  She remains on vent weaning phase 2.  Making some progress.    12/30-1/7:  increased bumex to 2 mg IV bid- changed on 12/30, some erythema, will monitor - may not  need to treat for cellulitis.  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  Increased urinary output, decreased swelling of LE.  On 1/1,  spoke with patient and she would like to be DNR.  She will go through with the swallow test tomorrow but has decided she no longer will have NJ tube in and will eat no matter what the test results are.  She agrees to continue with NJ tube tube until tomorrow.  She reports that her legs feel better.  On 1/2:  NJ discontinued, passed swallow test, diet changed to bite sized food and thin liquids  1/3:  walking around nursing station with PT and walker and 2L of O2 with trach capped, doing well, in much better spirits today.  Reports stool is getting more thick and wants to be off scheduled imodium, it was discontinued, she still has prn ordered.  Improved LE swelling and increased creatinine- decreased bumex to 1.5 IV bid.  1/6: still with LE edema despite aggressive IV diuresis.  Encouraged ambulation and leg elevation.  Weight plateauing - added metolazone for 2 doses and changed dosing times of bumex.  Decreased hydroxyzine dosing to minimize anticholinergic effect in elderly - especially with diuresis and wanting to avoid urinary retention.     1/8-1/9:  k is 2.7, will give extra 60 meq KCL and repeat K at 2pm-  K is 3.5, will follow up in morning.  2pm on 1/8 we had a care conference, all questions were answered.  She has cardio appt set for 2/2.      Follow up  -cardio outpt appt 2/2     Assessment & Plan   Acute respiratory failure and arrest on 12/6/23   Acute hypercapnic and hypoxic respiratory failure requiring BiPAP  s/p tracheostomy 12/13/23  s/p Mechanical ventilation  Aspiration  pneumonitis vs pneumonia  Possible aspiration event 12/1  Near complete plugging of left lower lobe on CT   Acute mildly displaced and nondisplaced right 3-5 rib fractures with associated right chest wall hematoma and nondisplaced anterolateral left fourth and fifth rib fractures  - weaning per pulmonology  -RT for tracheostomy cares, oxygenation, nebs, pulmonary therapies  - Tracheostomy sutures removed (12/18/2023)  - Mucomyst, duoneb w/ RT   - SLP speaking valve as tolerated      S/p respiratory arrest 12/6  Hx of severe TR s/p TVR  HFpEF  Hx of Afib s/p AVN ablation - PPM, upgraded to leadless 7/2023  Hypertension  Hyperlipidemia  Hx of aortic dissection repair 2019  Respiratory arrest 12/6   - Stable paced rhythm with underlying atrial flutter. On review of cardiology, underlying aflutter/afib has been present since at least June of this year. Device RN adjusted PPM to PTA settings rate 60 at [employer].   - Pre-op 9/8/23 echo: LVEF 55/60%, mildly dilated RV, normal function  - Echo 12/4 with LVEF 60-65%, normal RV function, TV mean gradient 5 mmHg, no TR  - Echo 12/7 with no significant change from previous with the exception of mild reduction of RV. RV free wall dyskinesia.   - ASA 81 mg daily  -was on scheduled midodrine 10 mg Q8H but stopped that 12/16/23 since BP in the 140s systolic and stable  -Continue midodrine 5 mg TID PRN for SBP<100   - Started Warfarin 12/15.    -Discontinued lovenox 12/26/2023. Continue with coumadin INR on goal. Pharmacy consulted for Coumadin dosing (INR goal 2-3)      Acute on chronic diastolic heart failure with Elevated BNP, improving   - Held PTA spironolactone, torsemide, metoprolol due to hypotension at prior hospital  - Echo 12/4 with LVEF 60-65%, normal RV function, TV mean gradient 5 mmHg, no TR  - Echo 12/7 with no significant change from previous with the exception of mild reduction of RV. RV free wall dyskinesia.   - FLUID STATUS: Pre-op weight 70.9 kg, discharge weight  74.1 kg (163 lbs) on 12/15/2023 and 165 lbs on 12/16  -increased torsemide to 40 mg po daily on 12/19 then switched to bumex 12/22 and increased to bumex 1 mg BID on 12/23- increased to 1.5, now increased bumex to 2 mg IV bid on 12/30- decreased to 1.5 mg IV bid on 1/4 but now weight was plateauing so moved dosing times of bumex and gave one dose metolazone 1/7 AM 1 hour prior to bumex and repeat 1/8.  -proBNP trend appears improved from [place] of 02270 to now 3996 1/5  -has a new blister on her right foot likely due to fluid retention  -edema improving with leg elevation above heart and encouraged ambulation      blister on top of Right foot  -  likely due to fluid retention  -blister popped on 12/30  -monitor for cellulitis    Multiple loose stools   Hx of C. Difficile (9/2023)  Severe malnutrition in the context of acute illness  - s/p tube feeds   -passed swallow on 1/2, diet changed from tube feeds to bite sized and thin liquids.    - PPI famotidine  - Bowel regimen discontinued due to continued loose stools  - Finished vancomycin po 7 day course   - PRN Zofran  4 mg PO or IV Q6H  - PRN Compazine 5 mg IV Q6H   - loperamide 2 mg BID and prn     Hypernatremia   Hypokalemia  CKD stage 3  Lactic acidosis, resolved  IMAN on CKD, resolved   Edema  Baseline creat ~1.2-1.3   FLUID STATUS: Pre-op weight 70.9 kg, discharge weight 74.1 kg (163 lbs).   - Elevated sodium- it is now normal  -On free water flushes 30ml q4h  - I/O has been inaccurate due to unmeasured stools and voids.  - Replete lytes per protocol  - Avoid/limit nephrotoxins as able  -daily weights    Stress induced hyperglycemia  T2DM  Hgb A1c 5.7%, stable off meds  - PTA Jardiance on hold, resume once medically optimized   - Goal BG <180 for optimal healing     Stress induced leukocytosis  Probable aspiration pneumonitis vs pneumonia, resolved  - 11/30 Zosyn 7-day course for pneumonia (finished)   - 12/6 Given further decompensation, Vanco and meropenem was  started on 12/6   - Finished meropenem 7 day course (12/12)   - Finished Vancomycin PO 7 day course (for c-diff) (12/13)  - Blood culture with NGTD   - Urine with Candida and sputum with Yeast, likely both colonizations.   - Urine culture with NGTD     Hx of recent C. Difficile  - C. Difficile toxin PCR positive 12/3 with negative antigen and negative toxin - per antimicrobial stewardship team, more likely to be colonization with recent history and no apparent treatment  - Finished oral vancomycin course on 12/13, still with diarrhea but no abd pain, leucocytosis, or fevers so monitoring for now  - Monitor fever curve, WBC, and inflammatory markers as appropriate  - Enteric precautions per infection prevention protocol      3rd, 4th right rib fractures due to CPR  -Continue mini thoracotomy precautions  -Judicious use of narcotics, de-escalate as able     Acute blood loss anemia  - Hgb has been stable.   -Hgb 7.6 on 12/16 and 8.0 on 12/17/2023  -Iron studies: Serum iron 29, iron binding capacity 216, saturation index 13%.  -Follow CBC with weekly labs.   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   -f/up outpt     Diet: Snacks/Supplements Adult: Ensure Clear; With Meals  Regular Diet Adult    DVT Prophylaxis: warfarin  Barrientos Catheter: Not present  Lines: PRESENT      PICC 12/07/23 Triple Lumen Right Basilic ok to use PICC-Site Assessment: WDL      Cardiac Monitoring: None  Code Status: No CPR- Do NOT Intubate      Clinically Significant Risk Factors        # Hypokalemia: Lowest K = 2.7 mmol/L in last 2 days, will replace as needed       # Hypoalbuminemia:  "Lowest albumin = 2.7 g/dL at 1/4/2024  5:50 AM, will monitor as appropriate     # Hypertension: Noted on problem list    # Acute heart failure with preserved ejection fraction: heart failure noted on problem list, last echo with EF >50%, and receiving IV diuretics       # Overweight: Estimated body mass index is 26.61 kg/m  as calculated from the following:    Height as of this encounter: 1.613 m (5' 3.5\").    Weight as of this encounter: 69.2 kg (152 lb 9.6 oz).   # Severe Malnutrition: based on nutrition assessment      # Financial/Environmental Concerns: none   # Pacemaker present  # History of CABG: noted on surgical history       Disposition Plan     Expected Discharge Date: 01/15/2024      Destination: home with family            Clarissa Ndiaye MD  Hospitalist Service  LTACH  Securely message with [employer] (more info)  Text page via [employer] Paging/Directory   ______________________________________________________________________    Interval History   Reports that her LE edema is much better, feels less dyspnea with movement compared to last week     No chest pain, no abdominal pain.       Physical Exam   Vital Signs: Temp: 97.5  F (36.4  C) Temp src: Oral BP: (!) 149/65 Pulse: 64   Resp: 26 SpO2: 98 % O2 Device: Nasal cannula Oxygen Delivery: 1 LPM  Weight: 152 lbs 9.6 oz    GENERAL: no respiratory distress  HEENT: Head is normocephalic atraumatic eyes pupils appear equal round and reactive to light  NECK: Supple, Tracheostomy in place  LUNGS: clear to auscultation, decreased breath sounds at bases   HEART: S1 S2, Rate and rhythm is regular. No murmurs, 2+ BLE edema  ABDOMEN: Soft, nontender, no distension. Bowel sounds are positive. No guarding or rebound.  :  stool coming out of vagina    EXTREMITIES: 2+ bilateral lower extremity edema noted   SKIN: popped blister R foot, erythema improved of b/l lower extremities   PSYCHIATRIC: Normal affect and cognition     Medical Decision Making       56 MINUTES " SPENT BY ME on the date of service doing chart review, history, exam, documentation & further activities per the note.      Data   Lab Results   Component Value Date    WBC 6.1 12/28/2023    WBC 5.3 06/14/2021     Lab Results   Component Value Date    RBC 2.64 12/28/2023    RBC 4.33 06/14/2021     Lab Results   Component Value Date    HGB 7.8 12/28/2023    HGB 12.8 06/14/2021     Lab Results   Component Value Date    HCT 25.9 12/28/2023    HCT 40.1 06/14/2021     Lab Results   Component Value Date    MCV 98 12/28/2023    MCV 93 06/14/2021     Lab Results   Component Value Date    MCH 29.5 12/28/2023    MCH 29.6 06/14/2021     Lab Results   Component Value Date    MCHC 30.1 12/28/2023    MCHC 31.9 06/14/2021     Lab Results   Component Value Date    RDW 17.9 12/28/2023    RDW 13.5 06/14/2021     Lab Results   Component Value Date     12/28/2023     06/14/2021       Last Comprehensive Metabolic Panel:  Sodium   Date Value Ref Range Status   01/08/2024 136 135 - 145 mmol/L Final     Comment:     Reference intervals for this test were updated on 09/26/2023 to more accurately reflect our healthy population. There may be differences in the flagging of prior results with similar values performed with this method. Interpretation of those prior results can be made in the context of the updated reference intervals.    06/23/2021 138 133 - 144 mmol/L Final     Potassium   Date Value Ref Range Status   01/08/2024 3.5 3.4 - 5.3 mmol/L Final   12/13/2022 3.8 3.4 - 5.3 mmol/L Final   06/23/2021 4.5 3.4 - 5.3 mmol/L Final     Potassium POCT   Date Value Ref Range Status   01/06/2024 3.2 (L) 3.5 - 5.0 mmol/L Final     Chloride   Date Value Ref Range Status   01/08/2024 91 (L) 98 - 107 mmol/L Final   12/13/2022 99 94 - 109 mmol/L Final   06/23/2021 104 94 - 109 mmol/L Final     Carbon Dioxide   Date Value Ref Range Status   06/23/2021 31 20 - 32 mmol/L Final     Carbon Dioxide (CO2)   Date Value Ref Range Status    01/08/2024 35 (H) 22 - 29 mmol/L Final   12/13/2022 33 (H) 20 - 32 mmol/L Final     Anion Gap   Date Value Ref Range Status   01/08/2024 10 7 - 15 mmol/L Final   12/13/2022 7 3 - 14 mmol/L Final   06/23/2021 3 3 - 14 mmol/L Final     Glucose   Date Value Ref Range Status   12/13/2022 84 70 - 99 mg/dL Final   06/23/2021 86 70 - 99 mg/dL Final     GLUCOSE BY METER POCT   Date Value Ref Range Status   01/09/2024 117 (H) 70 - 99 mg/dL Final     Urea Nitrogen   Date Value Ref Range Status   01/08/2024 13.1 8.0 - 23.0 mg/dL Final   12/13/2022 31 (H) 7 - 30 mg/dL Final   06/23/2021 30 7 - 30 mg/dL Final     Creatinine   Date Value Ref Range Status   01/08/2024 1.04 (H) 0.51 - 0.95 mg/dL Final   06/23/2021 1.29 (H) 0.52 - 1.04 mg/dL Final     GFR Estimate   Date Value Ref Range Status   01/08/2024 54 (L) >60 mL/min/1.73m2 Final   06/23/2021 39 (L) >60 mL/min/[1.73_m2] Final     Comment:     Non  GFR Calc  Starting 12/18/2018, serum creatinine based estimated GFR (eGFR) will be   calculated using the Chronic Kidney Disease Epidemiology Collaboration   (CKD-EPI) equation.       Calcium   Date Value Ref Range Status   01/08/2024 8.8 8.8 - 10.2 mg/dL Final   06/23/2021 9.2 8.5 - 10.1 mg/dL Final     Bilirubin Total   Date Value Ref Range Status   01/08/2024 0.7 <=1.2 mg/dL Final   04/04/2019 1.0 0.2 - 1.3 mg/dL Final     Alkaline Phosphatase   Date Value Ref Range Status   01/08/2024 112 40 - 150 U/L Final     Comment:     Reference intervals for this test were updated on 11/14/2023 to more accurately reflect our healthy population. There may be differences in the flagging of prior results with similar values performed with this method. Interpretation of those prior results can be made in the context of the updated reference intervals.   04/04/2019 104 40 - 150 U/L Final     ALT   Date Value Ref Range Status   01/08/2024 10 0 - 50 U/L Final     Comment:     Reference intervals for this test were updated on  6/12/2023 to more accurately reflect our healthy population. There may be differences in the flagging of prior results with similar values performed with this method. Interpretation of those prior results can be made in the context of the updated reference intervals.     06/14/2021 22 0 - 50 U/L Final     AST   Date Value Ref Range Status   01/08/2024 22 0 - 45 U/L Final     Comment:     Reference intervals for this test were updated on 6/12/2023 to more accurately reflect our healthy population. There may be differences in the flagging of prior results with similar values performed with this method. Interpretation of those prior results can be made in the context of the updated reference intervals.   04/04/2019 27 0 - 45 U/L Final

## 2024-01-09 NOTE — PROGRESS NOTES
REHAB CARE PROGRESSION MEETING:    Medical Team Conference:  OT present for 20 minutes.  See progress noted dated today ashley by RN Care Coordinator for details. Pt, spouse and daughter were present for conference.     Inge Sun OTR/MICHELA

## 2024-01-09 NOTE — PLAN OF CARE
"Problem: Artificial Airway  Goal: Effective Communication  Outcome: Progressing  Goal: Optimal Device Function  Outcome: Progressing  Intervention: Optimize Device Care and Function  Recent Flowsheet Documentation  Taken 1/9/2024 0020 by Haim Mix RT  Airway Safety Measures: all equipment/monitors on and audible  Goal: Absence of Device-Related Skin or Tissue Injury  Outcome: Progressing  Problem: Artificial Airway  Goal: Effective Communication  Outcome: Progressing  Goal: Optimal Device Function  Outcome: Progressing     RT PROGRESS NOTE     DATA:     CURRENT SETTINGS: 14,400, +5 stby             TRACH TYPE / SIZE: #5 Bivona, placed on 01/08             MODE: cap             FIO2: RA to 1L nasal cannula      ACTION:             THERAPIES:              SUCTION:                           FREQUENCY: x1                        AMOUNT: small                        CONSISTENCY: thick                        COLOR: pale yellow             SPONTANEOUS COUGH EFFORT/STRENGTH OF EFFORT (not elicited by suctioning): good productive              WEANING PHASE: 3                        WEAN MODE: cap                        WEAN TIME: as tolerated                         END WEAN REASON:      RESPONSE:             BS: Clear             VITAL SIGNS: /64 (BP Location: Left arm)   Pulse 64   Temp 97.6  F (36.4  C) (Oral)   Resp 20   Ht 1.613 m (5' 3.5\")   Wt 70.7 kg (155 lb 14.4 oz)   SpO2 97%   BMI 27.18 kg/m                  EMOTIONAL NEEDS / CONCERNS:                  RISK FOR SELF DECANNULATION:                          RISK DUE TO:                          INTERVENTION/S IN PLACE IS/ARE:         NOTE / PLAN:  cap as tolerated.                                                                                                                                       "

## 2024-01-09 NOTE — PROGRESS NOTES
Care Management Follow Up    Length of Stay (days): 25    Expected Discharge Date: 01/15/2024     Concerns to be Addressed: other (see comments)  no concerns identified today  Patient plan of care discussed at interdisciplinary rounds: Yes    Anticipated Discharge Disposition:  Home with [facility].     Anticipated Discharge Services:  SN, PT and OT  Anticipated Discharge DME:  walker    Patient/family educated on Medicare website which has current facility and service quality ratings:    Education Provided on the Discharge Plan:    Patient/Family in Agreement with the Plan:      Referrals Placed by CM/SW:    Private pay costs discussed: Not needed ; family will provide transportation home.    Additional Information:    Patient discussed at IDT rounds this am.  Patient is doing better.  RD says that patient is trying to increase protein, and drinking Ensure Clear.  Her NJ tube was removed on 1/7/24 and calorie counts over the weekend showed that patient isn't eating quite as much as she reports.  Pulm plans to decannulate patient soon.  ST reviewed patient's recommended diet - of small bites and bite sized diet with small sips.  PT is working with patient but only needs to provide stand by assist.  She is ambulating with FWW  350 ft.     [facility] patient for early next week.  Will need home care orders for SN, PT and OT cari.    Caroline Willett RN, BSN  Care Coordination  [facility]  [phone]

## 2024-01-09 NOTE — PLAN OF CARE
Problem: Adult Inpatient Plan of Care  Goal: Optimal Comfort and Wellbeing  Outcome: Progressing  Intervention: Provide Person-Centered Care  Recent Flowsheet Documentation  Taken 1/9/2024 0100 by Judy Pino RN  Trust Relationship/Rapport:   care explained   choices provided   emotional support provided     Problem: Anxiety Signs/Symptoms  Goal: Improved Mood Symptoms (Anxiety Signs/Symptoms)  Outcome: Progressing  Intervention: Optimize Emotion and Mood  Recent Flowsheet Documentation  Taken 1/9/2024 0100 by Judy Pino RN  Supportive Measures: active listening utilized     Problem: Diarrhea  Goal: Effective Diarrhea Management  Outcome: Progressing  Intervention: Manage Diarrhea  Recent Flowsheet Documentation  Taken 1/9/2024 0100 by Judy Pino RN  Isolation Precautions: enteric precautions maintained  Medication Review/Management: medications reviewed   Goal Outcome Evaluation:       Pt  A & O X 4. Denied pain. All night. Pt had 2 soft to loose BM this shift. Continued to be on trach capped. Pt slept about 4-5 hours this shift on and off. Pt claimed she did not sleep good since she kept using the bathroom probably about 4 times this shift. Lab drawn this morning.  Will continue plan of cares.

## 2024-01-09 NOTE — PLAN OF CARE
Problem: Artificial Airway  Goal: Effective Communication  Outcome: Progressing  Goal: Optimal Device Function  Outcome: Progressing  Intervention: Optimize Device Care and Function  Recent Flowsheet Documentation  Taken 1/9/2024 0900 by Eleni Palmer, RT  Airway Safety Measures: all equipment/monitors on and audible  Goal: Absence of Device-Related Skin or Tissue Injury  Outcome: Progressing   Goal Outcome Evaluation:      RT PROGRESS NOTE     DATA:     CURRENT SETTINGS:             TRACH TYPE / SIZE:  #5 [place] changed 1/8/24             MODE:   Cap/nc             FIO2:   1 lpm nc     ACTION:             THERAPIES:   Flutter valve x1, instructed pt to cont using Flutter valve on her own in between schedule tx.             SUCTION:  none                        SPONTANEOUS COUGH EFFORT/STRENGTH OF EFFORT (not elicited by suctioning): strong dry                              WEANING PHASE:   3                        WEAN MODE:    Trach capped/nc                         WEAN TIME:   Since 1/8/23                        END WEAN REASON:        RESPONSE:             BS:   clear and diminished             VITAL SIGNS:   SPO2 97%, HR 63, RR 20             EMOTIONAL NEEDS / CONCERNS:  No                RISK FOR SELF DECANNULATION:  No                           NOTE / PLAN:     Pt to cont with trach capped as marbella, Titrate O2 to RA as able.

## 2024-01-09 NOTE — CONSULTS
"[facility] ([facility])  SPIRITUAL ASSESSMENT Progress Note  [facility]. Unit [facility]    REFERRAL SOURCE: Consult (Routine) Thank you for the referral      I met with Priya briefly. She was having difficulty breathing and talking. I introduced myself. She is 'sick and tired of being sick and tired' and today was a difficult day for her. She briefly told me about the medical journey that brought her to [facility].  We prayed some traditional Jain prayers together to end the visit.     Priya home Religious is \"[facility] [employer] [facility]\" in [place]. I phoned her  Fr. Yaniv  and asked for a member of the pastoral team to visit,preferably a  so she could be anointed.      PLAN: [facility] will bring communion to Priya on Thursday or when next on site.      Shurthi Bergeron, Ph.D., [facility]      [facility] available 24/7 for emergency requests/referrals, either by having the on-call  paged or by entering an ASAP/STAT consult in Epic (this will also page the on-call ).  "

## 2024-01-10 NOTE — PLAN OF CARE
Problem: Malnutrition  Goal: Improved Nutritional Intake  Outcome: Progressing  Intervention: Promote and Optimize Oral Intake  Description: Perform a nutrition assessment; include a nutrition-focused physical exam.  Determine calorie, protein, vitamin, mineral and fluid requirements.  Assess for micronutrient deficiencies; supplement if depleted.  Assess need and assist with meal set-up and feeding.  Adjust diet or meal schedule based on preferences and tolerance.  Minimize unnecessary dietary restrictions to increase oral intake.  Offer oral supplemental food or drinks to enhance calorie and protein intake.  Establish bowel elimination program to increase comfort and appetite.  Provide and encourage oral hygiene to enhance desire to eat.  Consider enteral nutrition support if oral intake remains inadequate.  Flowsheets (Taken 1/10/2024 1514)  Nutrition Interventions:   supplemental drinks provided   meals from home/family encouraged     Problem: Oral Intake Inadequate  Goal: Improved Oral Intake  Outcome: Progressing  Intervention: Promote and Optimize Oral Intake  Description: Perform a nutrition assessment; include a nutrition-focused physical exam.  Determine calorie, protein, vitamin, mineral and fluid requirements.  Assess for micronutrient deficiencies; supplement if depleted.  Assess need for, and assist with, meal set-up and feeding.  Adjust diet or meal schedule based on preferences and tolerance.  Offer oral supplemental food or drinks to enhance calorie and protein intake.  Establish bowel elimination program to increase comfort and appetite.  Minimize unnecessary dietary restrictions to increase oral intake.  Provide and encourage oral hygiene to enhance desire to eat.  Consider enteral nutrition support if oral intake remains inadequate; provide parenteral nutrition if enteral is contraindicated.  Flowsheets (Taken 1/10/2024 1514)  Nutrition Interventions:   supplemental drinks provided   meals from  home/family encouraged   Goal Outcome Evaluation:  PO intakes improving, patient ordering more and family bringing in high protein foods.

## 2024-01-10 NOTE — PROGRESS NOTES
CLINICAL NUTRITION SERVICES - REASSESSMENT NOTE      Recommendations Ordered by Registered Dietitian (RD):   Continue Ensure Clear  Provided encouragement and affirmed high protein food choices that patient is ordering    Future/Additional Recommendations:   Monitor PO intakes, weight trends   Malnutrition:   Previously documented severe malnutrition in the context of acute illness       EVALUATION OF PROGRESS TOWARD GOALS   Diet:  Orders Placed This Encounter      Regular Diet Adult  Apple Ensure Clear daily    Nutrition Support:  NJT removed 1/2     Intake/Tolerance:    Patient consumed an average of 1089 kcal, 29g protein/day 1/5-1/7. This meets 74% caloric needs and 41% protein needs (low-end estimates). Patients best PO intakes were 1/6 when she consumed 100% of 2 oral nutrition supplements. She states that she does not like Magic Cups and Gelatein Plus.     Patient ordering more food: 1748 kcal, 51g protein yesterday. 100% meal intakes yesterday per flowsheets. She also states that her family brought in cottage cheese and that they will again tomorrow. Her appetite and intakes appear to be improving.    ASSESSED NUTRITION NEEDS:  Dosing Weight: 58.9 kg adjusted weight  Estimated Energy Needs: 8027-9178 kcals/day (25 - 30 kcals/kg)  Justification: Maintenance  Estimated Protein Needs: 70-88 grams protein/day (1.2 - 1.5 grams of pro/kg)  Justification: Hypercatabolism with acute illness  Estimated Fluid Needs: 2320-9791 mL/day (1 mL/kcal)   Justification: Maintenance    NEW FINDINGS:   - decannulation tomorrow likely  - trach capping since 1/8    Skin: popped blister R foot, erythema improved of b/l lower extremities   I/O: +10L in 2 weeks per chart  BM: stooling improved w/ 3-4 Bms/day (compared to 9 Bms on 1/4)  Meds: 2mg Bumex, remeron, MVI/M, protonix, Kcl, florastor, warfarin  Electrolytes: hypokalemia and hyponatremia noted below, managed w/ medications above  BG: remains WNL  Weight: recent losses noted  d/t diuresis, difficult to determine true weight shifts  01/10/24 0700 67 kg (147 lb 12.8 oz) Standing scale   01/09/24 0626 69.2 kg (152 lb 9.6 oz) Standing scale   01/08/24 0700 70.7 kg (155 lb 14.4 oz) --   01/07/24 0804 72.9 kg (160 lb 11.2 oz) Standing scale   01/05/24 0757 73.8 kg (162 lb 11.2 oz) Standing scale   01/03/24 0820 73.9 kg (163 lb) Standing scale     Labs:  Electrolytes  Potassium (mmol/L)   Date Value   01/10/2024 2.9 (L)   01/09/2024 3.3 (L)   01/08/2024 3.5   12/13/2022 3.8   09/27/2022 4.0   07/22/2022 4.0   06/23/2021 4.5   06/14/2021 4.0   03/23/2021 4.1     Potassium POCT (mmol/L)   Date Value   01/10/2024 2.6 (LL)   01/06/2024 3.2 (L)   01/04/2024 3.7     Phosphorus (mg/dL)   Date Value   01/10/2024 3.6   01/09/2024 3.0   01/08/2024 3.2   01/07/2024 3.0   01/06/2024 3.4   01/28/2019 4.1   01/24/2019 4.0   01/24/2019 4.0   01/23/2019 2.7   01/21/2019 3.5    Blood Glucose  Glucose (mg/dL)   Date Value   01/09/2024 100 (H)   01/08/2024 87   01/05/2024 88   01/04/2024 85   01/01/2024 108 (H)   12/13/2022 84   09/27/2022 87   07/22/2022 89   06/30/2022 89   06/13/2022 93   06/23/2021 86   06/14/2021 78   03/23/2021 76   12/29/2020 80   10/30/2020 94     GLUCOSE BY METER POCT (mg/dL)   Date Value   01/09/2024 117 (H)   12/30/2023 101 (H)   12/30/2023 93   12/23/2023 120 (H)   12/23/2023 120 (H)     Glucose Whole Blood POCT (mg/dL)   Date Value   01/10/2024 92   01/06/2024 100   01/04/2024 85   12/27/2023 107   12/27/2023 167 (H)     Hemoglobin A1C (%)   Date Value   11/07/2023 5.7 (H)   01/30/2019 5.6   01/04/2019 5.3    Inflammatory Markers  WBC (10e9/L)   Date Value   06/14/2021 5.3   04/04/2019 6.7   01/29/2019 6.6     WBC Count (10e3/uL)   Date Value   01/08/2024 8.3   01/04/2024 8.1   01/01/2024 6.8     Albumin (g/dL)   Date Value   01/08/2024 3.2 (L)   01/04/2024 2.7 (L)   01/01/2024 3.1 (L)   04/04/2019 3.2 (L)   03/24/2019 3.0 (L)   03/23/2019 2.8 (L)   03/21/2019 3.0 (L)   01/28/2019  2.7 (L)   01/24/2019 3.1 (L)      Magnesium (mg/dL)   Date Value   01/08/2024 1.9   01/04/2024 2.2   01/02/2024 2.4 (H)   01/28/2019 2.4 (H)   01/25/2019 2.3   01/23/2019 2.2     Sodium (mmol/L)   Date Value   01/09/2024 134 (L)   01/08/2024 136   01/05/2024 138   06/23/2021 138   06/14/2021 138   03/23/2021 140     Sodium POCT (mmol/L)   Date Value   01/10/2024 137   01/06/2024 138    Renal  Urea Nitrogen (mg/dL)   Date Value   01/09/2024 12.9   01/08/2024 13.1   01/05/2024 24.0 (H)   12/13/2022 31 (H)   09/27/2022 29   07/22/2022 30   06/23/2021 30   06/14/2021 29   03/23/2021 29     Creatinine (mg/dL)   Date Value   01/09/2024 1.06 (H)   01/08/2024 1.04 (H)   01/05/2024 1.15 (H)   06/23/2021 1.29 (H)   06/14/2021 1.27 (H)   03/23/2021 1.06 (H)     Additional  Triglycerides (mg/dL)   Date Value   06/14/2021 71   01/28/2019 96   01/21/2019 131     Ketones Urine (mg/dL)   Date Value   12/10/2023 Negative   04/04/2019 Negative        Previous Goals:   Maintain dry weight - unable to assess, fluid masking trends  PO intakes to meet at least 75% estimated caloric and protein needs - progressing    Previous Nutrition Diagnosis:   Predicted inadequate nutrient intake related to TF weaning, restricted diet texture as evidenced by soft and bite-sized diet, poor appetite, patient fears to eat solid foods.   Evaluation: Improving    CURRENT NUTRITION DIAGNOSIS  Predicted inadequate nutrient intake related to prolonged hospitalization/illness.    INTERVENTIONS  Recommendations / Nutrition Prescription  See top of note.    Implementation  Composition of Meals and Snacks and Medical Food Supplement    Goals  Maintain dry weight  PO intakes to meet at least 75% estimated caloric and protein needs    MONITORING AND EVALUATION:  Progress towards goals will be monitored and evaluated per protocol and Practice Guidelines    Angelita Smith RDN, LD  Clinical Dietitian

## 2024-01-10 NOTE — PROGRESS NOTES
[facility]    Medicine Progress Note - Hospitalist Service    Date of Admission:  12/15/2023    Brief summary:  Priya Funez is a 83 year old female with a PMHx of HFpEF, severe TR, permanent AF s/p AVN ablation with PPM implant 1/11/19 s/p extraction TV (transvenous) PPM and implant leadless PPM 7/31/23, emergent aortic dissection repair 1/4/19, HTN, CKD, NEDA, and obesity who presented to [facility] as an outpatient for elective TVR done on 11/28/2023.  Immediate postoperative course was complicated by acute hypoxic and hypercapnic respiratory failure requiring BiPAP and probable pneumonia and treated with 7 days of IV Zosyn, patient was transferred to surgical telemetry floor on 12/2/2020.  On 12/6/2023 she was transferred back to CVICU after RRT for acute hypoxic respiratory distress and subsequent respiratory arrest with short CPR, re-intubation, and ROSC achieved. She sustained mildly displaced and nondisplaced right 3-5 rib fractures with associated right chest wall hematoma and nondisplaced anterolateral left 4th and 5th rib fractures. She was treated with meropenem 7 day course. She also has hx of recent C diff and multiple loose stools, for which she completed a 7 day po vancomycin course (per antimicrobial stewardship team, more likely to be colonization with recent history and no apparent treatment). Patient was transiently hyperglycemic and treated with insulin infusion then transitioned to sliding scale insulin per protocol. Blood sugars remained stable, has not required insulin coverage.  She remained intubated in the CVICU until undergoing tracheostomy by Dr. Pitts on 12/13/23, doing well with trach dome and PS currently.  She was treated for volume overload with diuretics (Bumex and chlorothiazide) with subsequent hypernatremia and hypotension requiring FWF and pressor support.  Spironolactone, torsemide and metoprolol discontinued and midodrine started with now stable blood pressures. Pre-op weight 70.9 kg,  discharge weight 74.1 kg (163 lbs) on 12/15/2023.  Patient was discharged to LTACH 12/15/2023 for continuing vent weaning, therapies      LTACH course:  12/17: Vitals stable.  Scheduled midodrine discontinued with BP stable in the 140s.  Diuretic therapy with torsemide restarted given peripheral edema and volume status, however at lower dose of 10 mg daily (home dose 40 mg daily) titrate slowly based on BP response.  Resume metoprolol for A-fib rate control as well gradually.  Labs reviewed, stable electrolytes, creatinine 0.82. ProBNP 3710, down from 08378 and 83111.  Hgb 8.0 from 7.6.  INR 0.98 from 1.12.  Continue heparin and Coumadin together for now until INR therapeutic.  Started hydroxyzine for anxiety 12/17.  A.m. labs ordered.     12/18-12/24:  increased torsemide to 20 mg po daily due to peripheral edema on 12/18, having loose stools,  changed tube feeds to higher fiber formula and add imodium daily and prn.  Still with edema,  increase toresemide to 40 mg po daily on 12/19 and increased imodium to bid and prn, changed heparin to lovenox for pt comfort.  She is getting prn hydroxyzine for anxiety which we should try to titrate off as anticoholinergics are not optimal in the geriatric population. 12/22 switched to bumex 1 mg daily and increased to bumex 1 mg BID 12/23.  Encouraged leg elevation above heart. Give hydrochlorothiazide one dose 12/24 - if that doesn't help then would need to try IV diuretics.  INR above 2 for the first time on 12/24 so if above 2 on 12/25 then could stop the lovenox.  12/25 - 12/29.  Had extensive lower extremity edema.  Switched Bumex to IV and then increased it from Bumex 1 mg twice daily to Bumex 1.5 mg twice daily.  Has been diuresing well.  Developed dermatitis on the lower extremities.  Started hydrocortisone cream.  12/27, VBG revealed hypercapnia she was put back on the mechanical ventilator with improvement.  12/29. Continues to diurese well. Plan to monitor kidney  function with creatinine.  She remains on vent weaning phase 2.  Making some progress.  12/30-1/7:  increased bumex to 2 mg IV bid- changed on 12/30, some erythema, will monitor - may not  need to treat for cellulitis.  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  Increased urinary output, decreased swelling of LE.  On 1/1,  spoke with patient and she would like to be DNR.  She will go through with the swallow test tomorrow but has decided she no longer will have NJ tube in and will eat no matter what the test results are.  She agrees to continue with NJ tube tube until tomorrow.  She reports that her legs feel better.  On 1/2:  NJ discontinued, passed swallow test, diet changed to bite sized food and thin liquids  1/3:  walking around nursing station with PT and walker and 2L of O2 with trach capped, doing well, in much better spirits today.  Reports stool is getting more thick and wants to be off scheduled imodium, it was discontinued, she still has prn ordered.  Improved LE swelling and increased creatinine- decreased bumex to 1.5 IV bid.  1/6: still with LE edema despite aggressive IV diuresis.  Encouraged ambulation and leg elevation.  Weight plateauing - added metolazone for 2 doses and changed dosing times of bumex.  Decreased hydroxyzine dosing to minimize anticholinergic effect in elderly - especially with diuresis and wanting to avoid urinary retention.   1/8-1/9:  k is 2.7, will give extra 60 meq KCL and repeat K at 2pm-  K is 3.5, will follow up in morning.  2pm on 1/8 we had a care conference, all questions were answered.  She has cardio appt set for 2/2.      1/10: KCl 40mEq x1 doses today. Bumex IV -> PO 2mg daily. Likely decannulation tomorrow per pulm.     Follow up  -cardio outpt appt 2/2     Assessment & Plan   Acute respiratory failure and arrest on 12/6/23   Acute hypercapnic and hypoxic respiratory  failure requiring BiPAP  s/p tracheostomy 12/13/23  s/p Mechanical ventilation  Aspiration pneumonitis vs pneumonia  Possible aspiration event 12/1  Near complete plugging of left lower lobe on CT   Acute mildly displaced and nondisplaced right 3-5 rib fractures with associated right chest wall hematoma and nondisplaced anterolateral left fourth and fifth rib fractures  - weaning per pulmonology  -RT for tracheostomy cares, oxygenation, nebs, pulmonary therapies  - Tracheostomy sutures removed (12/18/2023)  - Mucomyst, duoneb w/ RT   - SLP speaking valve as tolerated      S/p respiratory arrest 12/6  Hx of severe TR s/p TVR  HFpEF  Hx of Afib s/p AVN ablation - PPM, upgraded to leadless 7/2023  Hypertension  Hyperlipidemia  Hx of aortic dissection repair 2019  Respiratory arrest 12/6   - Stable paced rhythm with underlying atrial flutter. On review of cardiology, underlying aflutter/afib has been present since at least June of this year. Device RN adjusted PPM to PTA settings rate 60 at [facility].   - Pre-op 9/8/23 echo: LVEF 55/60%, mildly dilated RV, normal function  - Echo 12/4 with LVEF 60-65%, normal RV function, TV mean gradient 5 mmHg, no TR  - Echo 12/7 with no significant change from previous with the exception of mild reduction of RV. RV free wall dyskinesia.   - ASA 81 mg daily  -was on scheduled midodrine 10 mg Q8H but stopped that 12/16/23 since BP in the 140s systolic and stable  -Continue midodrine 5 mg TID PRN for SBP<100   - Started Warfarin 12/15.    -Discontinued lovenox 12/26/2023. Continue with coumadin INR on goal. Pharmacy consulted for Coumadin dosing (INR goal 2-3)      Acute on chronic diastolic heart failure with Elevated BNP, improving   - Held PTA spironolactone, torsemide, metoprolol due to hypotension at prior hospital  - Echo 12/4 with LVEF 60-65%, normal RV function, TV mean gradient 5 mmHg, no TR  - Echo 12/7 with no significant change from previous with the exception of mild reduction  of RV. RV free wall dyskinesia.   - FLUID STATUS: Pre-op weight 70.9 kg, discharge weight 74.1 kg (163 lbs) on 12/15/2023 and 165 lbs on 12/16  -increased torsemide to 40 mg po daily on 12/19 then switched to bumex 12/22 and increased to bumex 1 mg BID on 12/23- increased to 1.5, now increased bumex to 2 mg IV bid on 12/30- decreased to 1.5 mg IV bid on 1/4 but now weight was plateauing so moved dosing times of bumex and gave one dose metolazone 1/7 AM 1 hour prior to bumex and repeat 1/8.  - bumex IV -> 2mg PO daily  -proBNP trend appears improved [place] 98370 to now 3996 1/5  -has a new blister on her right foot likely due to fluid retention  -edema improving with leg elevation above heart and encouraged ambulation    blister on top of Right foot  - likely due to fluid retention  -blister popped on 12/30  -monitor for cellulitis    Multiple loose stools   Hx of C. Difficile (9/2023)  Severe malnutrition in the context of acute illness  - s/p tube feeds   -passed swallow on 1/2, diet changed from tube feeds to bite sized and thin liquids.    - PPI famotidine  - Bowel regimen discontinued due to continued loose stools  - Finished vancomycin po 7 day course   - PRN Zofran  4 mg PO or IV Q6H  - PRN Compazine 5 mg IV Q6H   - loperamide 2 mg BID and prn     Hypernatremia   Hypokalemia  CKD stage 3  Lactic acidosis, resolved  IMAN on CKD, resolved   Edema  Baseline creat ~1.2-1.3   FLUID STATUS: Pre-op weight 70.9 kg, discharge weight 74.1 kg (163 lbs).   - Elevated sodium- it is now normal  -On free water flushes 30ml q4h  - I/O has been inaccurate due to unmeasured stools and voids.  - Replete lytes per protocol  - KCl 30mEq BID with prn replacement  - Avoid/limit nephrotoxins as able  -daily weights    Stress induced hyperglycemia  T2DM  Hgb A1c 5.7%, stable off meds  - PTA Jardiance on hold, resume once medically optimized   - Goal BG <180 for optimal healing     Stress induced leukocytosis  Probable  aspiration pneumonitis vs pneumonia, resolved  - 11/30 Zosyn 7-day course for pneumonia (finished)   - 12/6 Given further decompensation, Vanco and meropenem was started on 12/6   - Finished meropenem 7 day course (12/12)   - Finished Vancomycin PO 7 day course (for c-diff) (12/13)  - Blood culture with NGTD   - Urine with Candida and sputum with Yeast, likely both colonizations.   - Urine culture with NGTD     Hx of recent C. Difficile  - C. Difficile toxin PCR positive 12/3 with negative antigen and negative toxin - per antimicrobial stewardship team, more likely to be colonization with recent history and no apparent treatment  - Finished oral vancomycin course on 12/13, still with diarrhea but no abd pain, leucocytosis, or fevers so monitoring for now  - Monitor fever curve, WBC, and inflammatory markers as appropriate  - Enteric precautions per infection prevention protocol      3rd, 4th right rib fractures due to CPR  -Continue mini thoracotomy precautions  -Judicious use of narcotics, de-escalate as able     Acute blood loss anemia  - Hgb has been stable.   -Hgb 7.6 on 12/16 and 8.0 on 12/17/2023  -Iron studies: Serum iron 29, iron binding capacity 216, saturation index 13%.  -Follow CBC with weekly labs.   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   -f/up outpt     Diet: Snacks/Supplements Adult: Ensure Clear; With Meals  Regular Diet Adult    DVT Prophylaxis: warfarin  Barrientos Catheter: Not present  Lines: PRESENT      PICC 12/07/23 Triple Lumen Right Basilic ok to use PICC-Site Assessment: WDL      Cardiac Monitoring: None  Code Status: No CPR- Do NOT  "Intubate      Clinically Significant Risk Factors        # Hypokalemia: Lowest K = 2.6 mmol/L in last 2 days, will replace as needed   # Hypocalcemia: Lowest iCa = 1.13 mg/dL in last 2 days, will monitor and replace as appropriate     # Hypoalbuminemia: Lowest albumin = 2.7 g/dL at 1/4/2024  5:50 AM, will monitor as appropriate     # Hypertension: Noted on problem list    # Acute heart failure with preserved ejection fraction: heart failure noted on problem list, last echo with EF >50%, and receiving IV diuretics       # Overweight: Estimated body mass index is 25.77 kg/m  as calculated from the following:    Height as of this encounter: 1.613 m (5' 3.5\").    Weight as of this encounter: 67 kg (147 lb 12.8 oz).   # Severe Malnutrition: based on nutrition assessment      # Financial/Environmental Concerns: none   # Pacemaker present  # History of CABG: noted on surgical history       Disposition Plan     Expected Discharge Date: 01/15/2024      Destination: home with family            Greg Jimenes MD  Hospitalist Service  LTACH  Securely message with [employer] (more info)  Text page via [employer] Paging/Directory   ______________________________________________________________________    Interval History   - no acute events ovn  - pt doing well today  - discussed decannulation likely tomorrow  - discussed potassium, repletion today, and recheck tomorrow  - discussed change in bumex dose  - no other acute concerns    Physical Exam   Vital Signs: Temp: 97.4  F (36.3  C) Temp src: Oral BP: 125/58 Pulse: 60   Resp: 22 SpO2: 96 % O2 Device: Nasal cannula Oxygen Delivery: 1 LPM  Weight: 147 lbs 12.8 oz    GENERAL: no respiratory distress  HEENT: Head is normocephalic atraumatic eyes pupils appear equal round and reactive to light  NECK: Supple, Tracheostomy in place  LUNGS: clear to auscultation, decreased breath sounds at bases   HEART: S1 S2, Rate and rhythm is regular. No murmurs, 2+ BLE edema  ABDOMEN: Soft, nontender, no " distension. Bowel sounds are positive. No guarding or rebound.  :  stool coming out of vagina    EXTREMITIES: 2+ bilateral lower extremity edema noted   SKIN: popped blister R foot, erythema improved of b/l lower extremities   PSYCHIATRIC: Normal affect and cognition     Medical Decision Making       45 MINUTES SPENT BY ME on the date of service doing chart review, history, exam, documentation & further activities per the note.      Data   Lab Results   Component Value Date    WBC 6.1 12/28/2023    WBC 5.3 06/14/2021     Lab Results   Component Value Date    RBC 2.64 12/28/2023    RBC 4.33 06/14/2021     Lab Results   Component Value Date    HGB 7.8 12/28/2023    HGB 12.8 06/14/2021     Lab Results   Component Value Date    HCT 25.9 12/28/2023    HCT 40.1 06/14/2021     Lab Results   Component Value Date    MCV 98 12/28/2023    MCV 93 06/14/2021     Lab Results   Component Value Date    MCH 29.5 12/28/2023    MCH 29.6 06/14/2021     Lab Results   Component Value Date    MCHC 30.1 12/28/2023    MCHC 31.9 06/14/2021     Lab Results   Component Value Date    RDW 17.9 12/28/2023    RDW 13.5 06/14/2021     Lab Results   Component Value Date     12/28/2023     06/14/2021       Last Comprehensive Metabolic Panel:  Sodium   Date Value Ref Range Status   01/09/2024 134 (L) 135 - 145 mmol/L Final     Comment:     Reference intervals for this test were updated on 09/26/2023 to more accurately reflect our healthy population. There may be differences in the flagging of prior results with similar values performed with this method. Interpretation of those prior results can be made in the context of the updated reference intervals.    06/23/2021 138 133 - 144 mmol/L Final     Sodium POCT   Date Value Ref Range Status   01/10/2024 137 135 - 145 mmol/L Final     Potassium   Date Value Ref Range Status   01/10/2024 2.9 (L) 3.4 - 5.3 mmol/L Final   12/13/2022 3.8 3.4 - 5.3 mmol/L Final   06/23/2021 4.5 3.4 - 5.3 mmol/L  Final     Potassium POCT   Date Value Ref Range Status   01/10/2024 2.6 (LL) 3.5 - 5.0 mmol/L Final     Chloride   Date Value Ref Range Status   01/09/2024 89 (L) 98 - 107 mmol/L Final   12/13/2022 99 94 - 109 mmol/L Final   06/23/2021 104 94 - 109 mmol/L Final     Carbon Dioxide   Date Value Ref Range Status   06/23/2021 31 20 - 32 mmol/L Final     Carbon Dioxide (CO2)   Date Value Ref Range Status   01/09/2024 34 (H) 22 - 29 mmol/L Final   12/13/2022 33 (H) 20 - 32 mmol/L Final     Anion Gap   Date Value Ref Range Status   01/09/2024 11 7 - 15 mmol/L Final   12/13/2022 7 3 - 14 mmol/L Final   06/23/2021 3 3 - 14 mmol/L Final     Glucose   Date Value Ref Range Status   12/13/2022 84 70 - 99 mg/dL Final   06/23/2021 86 70 - 99 mg/dL Final     GLUCOSE BY METER POCT   Date Value Ref Range Status   01/09/2024 117 (H) 70 - 99 mg/dL Final     Glucose Whole Blood POCT   Date Value Ref Range Status   01/10/2024 92 70 - 125 mg/dL Final     Urea Nitrogen   Date Value Ref Range Status   01/09/2024 12.9 8.0 - 23.0 mg/dL Final   12/13/2022 31 (H) 7 - 30 mg/dL Final   06/23/2021 30 7 - 30 mg/dL Final     Creatinine   Date Value Ref Range Status   01/09/2024 1.06 (H) 0.51 - 0.95 mg/dL Final   06/23/2021 1.29 (H) 0.52 - 1.04 mg/dL Final     GFR Estimate   Date Value Ref Range Status   01/09/2024 53 (L) >60 mL/min/1.73m2 Final   06/23/2021 39 (L) >60 mL/min/[1.73_m2] Final     Comment:     Non  GFR Calc  Starting 12/18/2018, serum creatinine based estimated GFR (eGFR) will be   calculated using the Chronic Kidney Disease Epidemiology Collaboration   (CKD-EPI) equation.       Calcium   Date Value Ref Range Status   01/09/2024 8.5 (L) 8.8 - 10.2 mg/dL Final   06/23/2021 9.2 8.5 - 10.1 mg/dL Final     Bilirubin Total   Date Value Ref Range Status   01/08/2024 0.7 <=1.2 mg/dL Final   04/04/2019 1.0 0.2 - 1.3 mg/dL Final     Alkaline Phosphatase   Date Value Ref Range Status   01/08/2024 112 40 - 150 U/L Final      Comment:     Reference intervals for this test were updated on 11/14/2023 to more accurately reflect our healthy population. There may be differences in the flagging of prior results with similar values performed with this method. Interpretation of those prior results can be made in the context of the updated reference intervals.   04/04/2019 104 40 - 150 U/L Final     ALT   Date Value Ref Range Status   01/08/2024 10 0 - 50 U/L Final     Comment:     Reference intervals for this test were updated on 6/12/2023 to more accurately reflect our healthy population. There may be differences in the flagging of prior results with similar values performed with this method. Interpretation of those prior results can be made in the context of the updated reference intervals.     06/14/2021 22 0 - 50 U/L Final     AST   Date Value Ref Range Status   01/08/2024 22 0 - 45 U/L Final     Comment:     Reference intervals for this test were updated on 6/12/2023 to more accurately reflect our healthy population. There may be differences in the flagging of prior results with similar values performed with this method. Interpretation of those prior results can be made in the context of the updated reference intervals.   04/04/2019 27 0 - 45 U/L Final

## 2024-01-10 NOTE — PLAN OF CARE
Problem: Adult Inpatient Plan of Care  Goal: Absence of Hospital-Acquired Illness or Injury  Intervention: Identify and Manage Fall Risk  Recent Flowsheet Documentation  Taken 1/10/2024 1620 by Alayna Caldwell RN  Safety Promotion/Fall Prevention: activity supervised  Taken 1/10/2024 1100 by Alayna Caldwell RN  Safety Promotion/Fall Prevention: activity supervised  Intervention: Prevent Skin Injury  Recent Flowsheet Documentation  Taken 1/10/2024 1620 by Alayna Caldwell RN  Body Position: position changed independently  Taken 1/10/2024 1100 by Alayna Caldwell RN  Body Position: position changed independently     Problem: Adult Inpatient Plan of Care  Goal: Optimal Comfort and Wellbeing  Outcome: Progressing     Problem: Anxiety Signs/Symptoms  Goal: Optimized Energy Level (Anxiety Signs/Symptoms)  Outcome: Progressing     Problem: Skin Injury Risk Increased  Goal: Skin Health and Integrity  Intervention: Optimize Skin Protection  Recent Flowsheet Documentation  Taken 1/10/2024 1620 by Alayna Caldwell RN  Activity Management:   up to bedside commode   activity adjusted per tolerance  Taken 1/10/2024 1100 by Alayna Caldwell RN  Activity Management:   up to bedside commode   activity adjusted per tolerance   Goal Outcome Evaluation:

## 2024-01-10 NOTE — PLAN OF CARE
"Problem: Artificial Airway  Goal: Effective Communication  Outcome: Progressing  Goal: Optimal Device Function  Outcome: Progressing  Intervention: Optimize Device Care and Function  Recent Flowsheet Documentation  Taken 1/10/2024 0030 by Haim Mix RT  Airway Safety Measures: all equipment/monitors on and audible  Taken 1/9/2024 2000 by Haim Mix RT  Airway Safety Measures: all equipment/monitors on and audible     RT PROGRESS NOTE     DATA:     CURRENT SETTINGS:              TRACH TYPE / SIZE: #5 Bivona, placed on 01/08             MODE: cap             FIO2: RA to 1L nasal cannula      ACTION:             THERAPIES: flutter valve BID             SUCTION:                           FREQUENCY:                         AMOUNT:                         CONSISTENCY:                         COLOR:              SPONTANEOUS COUGH EFFORT/STRENGTH OF EFFORT (not elicited by suctioning): non productive             WEANING PHASE: 3                        WEAN MODE: cap                        WEAN TIME: as tolerated                         END WEAN REASON:      RESPONSE:             BS: Clear             VITAL SIGNS: /76   Pulse 62   Temp 97.6  F (36.4  C) (Oral)   Resp 22   Ht 1.613 m (5' 3.5\")   Wt 69.2 kg (152 lb 9.6 oz)   SpO2 99%   BMI 26.61 kg/m                EMOTIONAL NEEDS / CONCERNS:                  RISK FOR SELF DECANNULATION:                          RISK DUE TO:                          INTERVENTION/S IN PLACE IS/ARE:         NOTE / PLAN:  cap as tolerated. To check VBG in A.M.   Venous Blood Gas  Recent Labs   Lab 01/10/24  0619 01/06/24  0138 01/04/24  0550   PHV 7.45 7.43 7.46*   PCO2V 62* 56* 58*   PO2V 33 31 28   HCO3V 40* 35* 38*   SHAWN 18.4 13.0 16.9                                                                                                                                                                "

## 2024-01-10 NOTE — PROGRESS NOTES
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 Admitted for elective TVR completed 11/28/2023. Subsequent respiratory arrest on 12/6. Completed multiple courses of antibiotics for presumed pneumonia  S/p trach 12/13. Transferred to LTAC 12/15. Progressed to continuous trach dome 12/20-26. Attempted 24/7 capping 12/26 with worsening hypercapnia 12/27 placed back on vent.  Suspect d/t acute on chronic volume overload, diuretics increased. Back to capping 1/4-5, then TM/PMV d/t inc wob.  Downsized trach to 5 Bivona with subjective improvement in breathing, now capping continuously since 1/8 on 1L NC.   Assessment:     Problems:  Acute hypercapnic/hypoxic respiratory failure s/p trach  Tracheostomy in place: 5  Bivona 1/8.   Dysphagia: passed BDT. Passed VFSS. Tolerating PO diet   Respiratory/cardiac arrest 12/6 possibly d/t mucus plugging  S/p TVR, HFpEF(55-60%)  IMAN, volume overload on diuretics: stable    Anticoagulation with coumadin for A-fib and bioprosthetic heart valve    Recommendations/Plan:  Phase 3: cap as tolerated. Check ABG in the morning and if stable, remains capped will plan to decannulate   Change Bumex to 2mg PO.  Looks contracted on VBG, leg edema improved   IS(500mL) and FV  Routine trach care per protocol    Keep progressing towards end goal of decannulation: hopefully end of this week     Subjective:   - denies sob, pain, n/v.  Doesn't like the anxiety medicine, \"makes me feel weird.\"     Tracheal secretions: scant to small amounts     Cough strength: strong    Ventilator weaning results: capping since 1/8    Clinical status discussed today with respiratory therapist       Medications:      dextrose      - MEDICATION INSTRUCTIONS -        aspirin  81 mg Oral " "Daily    [START ON 1/11/2024] bumetanide  2 mg Oral Daily    escitalopram  10 mg Oral Daily    melatonin  5 mg Oral QPM    miconazole   Topical BID    mirtazapine  15 mg Oral At Bedtime    multivitamin w/minerals  1 tablet Oral Daily    pantoprazole  40 mg Oral QAM AC    potassium chloride ER  30 mEq Oral BID    potassium chloride  40 mEq Oral TID    saccharomyces boulardii  250 mg Oral BID    sodium chloride (PF)  10 mL Intracatheter Q8H    Warfarin Therapy Reminder  1 each Oral See Admin Instructions         Exam/Data:   Vitals  /58 (BP Location: Left arm)   Pulse 60   Temp 97.4  F (36.3  C) (Oral)   Resp 22   Ht 1.613 m (5' 3.5\")   Wt 67 kg (147 lb 12.8 oz)   SpO2 96%   BMI 25.77 kg/m       No intake/output data recorded.  Weight change: -3.674 kg (-8 lb 1.6 oz)    Vent Mode: -- (CAP)  FiO2 (%): 21 %  Resp: 22    Capped on 1L NC    EXAM:  Gen: NAD in bedwith trach capped   HEENT: trach midline/intact, no stridor   CV: RRR, no m/g/r  Resp: CTAB, dimninished; non-labored, no wheeze  Abd: soft, nontender  Skin: b/l lower extremity rash(improved), large bulla on right dorsum(wrapped)  Ext: 1-2+ b/l lower extremity edema  Neuro: alert, follows commands     Labs:  Venous Blood Gas  Recent Labs   Lab 01/10/24  0619 01/06/24  0138 01/04/24  0550   PHV 7.45 7.43 7.46*   PCO2V 62* 56* 58*   PO2V 33 31 28   HCO3V 40* 35* 38*   SHAWN 18.4 13.0 16.9       Complete Blood Count   Recent Labs   Lab 01/10/24  0619 01/08/24  0527 01/06/24  0138 01/04/24  0550   WBC  --  8.3  --  8.1   HGB 9.0* 8.0* 8.4* 7.5*  7.7*   PLT  --  438  --  397     Basic Metabolic Panel  Recent Labs   Lab 01/10/24  0619 01/10/24  0616 01/09/24  0853 01/09/24 0624 01/08/24  1405 01/08/24  0527 01/07/24  0527 01/06/24  0138 01/05/24  0548 01/04/24  0550     --   --  134*  --  136  --  138 138 141  141   POTASSIUM 2.6* 2.9*  --  3.3* 3.5 2.7*   < > 3.2* 3.9 3.7  3.9   CHLORIDE  --   --   --  89*  --  91*  --   --  95* 96*   CO2  --   -- "   --  34*  --  35*  --   --  37* 39*   BUN  --   --   --  12.9  --  13.1  --   --  24.0* 30.2*   CR  --   --   --  1.06*  --  1.04*  --   --  1.15* 1.17*   GLC 92  --  117* 100*  --  87  --  100 88 85  85    < > = values in this interval not displayed.     Liver Function Tests  Recent Labs   Lab 01/10/24  0616 01/09/24 0624 01/08/24 0527 01/07/24 0527 01/05/24  0548 01/04/24  0550   AST  --   --  22  --   --  22   ALT  --   --  10  --   --  9   ALKPHOS  --   --  112  --   --  102   BILITOTAL  --   --  0.7  --   --  0.7   ALBUMIN  --   --  3.2*  --   --  2.7*   INR 2.72* 2.85* 2.89* 3.66*   < > 3.95*    < > = values in this interval not displayed.     Coagulation Profile  Recent Labs   Lab 01/10/24  0616 01/09/24 0624 01/08/24 0527 01/07/24 0527   INR 2.72* 2.85* 2.89* 3.66*       Radiology: Personally reviewed; radiology read below    XR CHEST 2 VIEWS 1/8/2024  Feeding tube has been removed. Decreasing right lower lung atelectasis. Clear left lung. No other significant change. Tracheostomy. Right PICC tip in the low SVC. Sternotomy and mitral valve repair. Mild cardiomegaly with normal pulmonary vascularity. Moderate right hemidiaphragm elevation. No pleural effusion or pneumothorax.    XR CHEST 12/27/2023  Tracheostomy. Sternotomy and valve replacement. Enteric tube within the stomach. Right upper extremity PICC line tip in the distal SVC. Heart is mildly enlarged, unchanged. Elevation of the right hemidiaphragm, unchanged. Minimal atelectasis at the right lung base. Lungs otherwise appear clear    XR CHEST 12/19/2023  Poststernotomy changes with a prosthetic TVR and tracheostomy tube tube in place. Feeding tube can be seen coursing into the stomach. Right upper extremity PICC line catheter overlies the proximal right atrium. Elevation of the right hemidiaphragm, unchanged with small right effusion and right basilar and likely middle lobe opacities, presumed atelectasis. Left lung is clear. Heart is slightly  enlarged. No signs of failure.    Matt Kennedy CNP  Pulmonary Medicine  [facility]  Pager [phone]  Office [phone]

## 2024-01-10 NOTE — PLAN OF CARE
Problem: Adult Inpatient Plan of Care  Goal: Absence of Hospital-Acquired Illness or Injury  Intervention: Identify and Manage Fall Risk  Recent Flowsheet Documentation  Taken 1/9/2024 1714 by Alayna Caldwell RN  Safety Promotion/Fall Prevention: activity supervised  Taken 1/9/2024 1334 by Alayna Caldwell RN  Safety Promotion/Fall Prevention: activity supervised     Problem: Adult Inpatient Plan of Care  Goal: Absence of Hospital-Acquired Illness or Injury  Intervention: Prevent Skin Injury  Recent Flowsheet Documentation  Taken 1/9/2024 1714 by Alayna Caldwell RN  Body Position: position changed independently  Taken 1/9/2024 1713 by Alayna Caldwell RN  Body Position: position changed independently  Taken 1/9/2024 1334 by Alayna Caldwell RN  Body Position: position changed independently  Taken 1/9/2024 0947 by Alayna Caldwell RN  Body Position: position changed independently     Problem: Skin Injury Risk Increased  Goal: Skin Health and Integrity  Intervention: Optimize Skin Protection  Recent Flowsheet Documentation  Taken 1/9/2024 1714 by Alayna Caldwell RN  Activity Management:   up to bedside commode   activity adjusted per tolerance  Taken 1/9/2024 1334 by Alayna Caldwell RN  Activity Management:   up to bedside commode   activity adjusted per tolerance   Goal Outcome Evaluation:

## 2024-01-10 NOTE — PLAN OF CARE
Problem: Adult Inpatient Plan of Care  Goal: Optimal Comfort and Wellbeing  Outcome: Progressing  Intervention: Provide Person-Centered Care  Recent Flowsheet Documentation  Taken 1/10/2024 0400 by Judy Pino RN  Trust Relationship/Rapport:   care explained   choices provided   emotional support provided     Problem: Anxiety Signs/Symptoms  Goal: Improved Sleep (Anxiety Signs/Symptoms)  Outcome: Progressing     Problem: Diarrhea  Goal: Effective Diarrhea Management  Outcome: Progressing  Intervention: Manage Diarrhea  Recent Flowsheet Documentation  Taken 1/10/2024 0400 by Judy Pino RN  Isolation Precautions: enteric precautions maintained  Medication Review/Management: medications reviewed   Goal Outcome Evaluation:         Vitals stable. Denied pain this shift. Continued to be continent and used the bathroom  almost every 2 hours. Tolerated trach capped all night. VBG d rawn this morning. Slept on and off about 4-5 hours. No loose BM noted this shift. Will continue plan of cares.

## 2024-01-11 NOTE — PLAN OF CARE
Problem: Malnutrition  Goal: Improved Nutritional Intake  Outcome: Progressing   Goal Outcome Evaluation: Pt ate 100 % of breakfast.    Pt alert and oriented x 4; vital signs stable. Pt denied pain.  Pt was decannulated today.  Pt spent time up in chair visiting with family.  All care needs met.    Richelle Gao RN

## 2024-01-11 NOTE — PLAN OF CARE
"Problem: Artificial Airway  Goal: Effective Communication  Outcome: Progressing  Goal: Optimal Device Function  Outcome: Progressing  Intervention: Optimize Device Care and Function  Recent Flowsheet Documentation  Taken 1/11/2024 0000 by Haim Mix RT  Airway Safety Measures: all equipment/monitors on and audible  Taken 1/10/2024 1943 by Haim Mix RT  Airway Safety Measures: all equipment/monitors on and audible     RT PROGRESS NOTE     DATA:     CURRENT SETTINGS:              TRACH TYPE / SIZE: #5 Bivona, placed on 01/08             MODE: cap             FIO2: 1L nasal cannula      ACTION:             THERAPIES: flutter valve BID             SUCTION:                           FREQUENCY: x1                        AMOUNT: scant                        CONSISTENCY: thick                        COLOR: white             SPONTANEOUS COUGH EFFORT/STRENGTH OF EFFORT (not elicited by suctioning): productive             WEANING PHASE: 3                        WEAN MODE: cap                        WEAN TIME: as tolerated                         END WEAN REASON:      RESPONSE:             BS: Clear             VITAL SIGNS: /59 (BP Location: Left arm)   Pulse 60   Temp 97.5  F (36.4  C) (Oral)   Resp 20   Ht 1.613 m (5' 3.5\")   Wt 67 kg (147 lb 12.8 oz)   SpO2 98%   BMI 25.77 kg/m                  EMOTIONAL NEEDS / CONCERNS:                  RISK FOR SELF DECANNULATION:                          RISK DUE TO:                          INTERVENTION/S IN PLACE IS/ARE:         NOTE / PLAN:  cap as tolerated. To check ABG in A.M.  Recent Labs   Lab 01/11/24  0611   PH 7.55*   PCO2 44   PO2 124*   HCO3 39*                                                                                                                                                                                               "

## 2024-01-11 NOTE — CONSULTS
SPIRITUAL HEALTH SERVICES ([facility])  SPIRITUAL ASSESSMENT Progress Note  [facility]. Unit LTACH     REFERRAL SOURCE: Consult (Routine)     Received a message from Priya lopez that he will attempt to visit her next Wednesday @ 3:30 pm to anoint her and bring communion. I will confirm with her when I'm next on site (Tuesday).  Her  can only commit to one visit and Priya spiritual is very important to her and her healing.     Priya was in good spirits today. The removal of her trach contributed to this. She again had difficulty breathing and speaking during our visit but was glad to have a chance to pray and receive communion.      PLAN: [facility] will continue to visit Priya during this hospitalization.    Shruthi Bergeron, Ph.D., [facility]      SHS available 24/7 for emergency requests/referrals, either by having the on-call  paged or by entering an ASAP/STAT consult in Epic (this will also page the on-call ).

## 2024-01-11 NOTE — PROGRESS NOTES
This patient met all criteria to discontinue the CDIFF infection flag today 1/11/24. I spoke to Yun, Charge RN about a possible transfer of patient to new room with Enteric clean in 4132, but there is not room capacity today.     Therefore, enteric precautions should remain activated until the patient is moved to new room, new bed, new linens per our C. Difficile management guidance. Once the patient transfer out, room 4132 requires Enteric discharge clean, performed by EVS prior to patient care use.     .Destinee Vance, Infection Prevention  .1/11/2024  .12:44 PM

## 2024-01-11 NOTE — PROGRESS NOTES
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 Admitted for elective TVR completed 11/28/2023. Subsequent respiratory arrest on 12/6. Completed multiple courses of antibiotics for presumed pneumonia  S/p trach 12/13. Transferred to LTAC 12/15. Progressed to continuous trach dome 12/20-26. Attempted 24/7 capping 12/26 with worsening hypercapnia 12/27 placed back on vent.  Suspect d/t acute on chronic volume overload, diuretics increased. Back to capping 1/4-5, then TM/PMV d/t inc wob.  Downsized trach to 5 Bivona with subjective improvement in breathing, now capping continuously since 1/8 on 1L NC.   Assessment:     Problems:  Acute hypercapnic/hypoxic respiratory failure s/p trach: Improving Repeat CxR 1/8 with decreasing right lower lung atelectasis & clear left lung. On room air to 1L NC.  Repeat ABG this morning shows metabolic alkalosis likely contraction related from aggressive diuresis. Has remain capped now continuously since 1/8, minimal to no secretions, adequate cough.   Tracheostomy in place: 5  Bivona 1/8.   Dysphagia: passed BDT. Passed VFSS. Tolerating PO diet   Respiratory/cardiac arrest 12/6 possibly d/t mucus plugging  S/p TVR, HFpEF(55-60%)  IMAN, volume overload on diuretics: stable, diuretics switched from IV to PO 1/10  Anticoagulation with coumadin for A-fib and bioprosthetic heart valve    Recommendations/Plan:  Ok to decannulate trach  Continue PO Bumex 2mg daily. Monitor renal function and adjust as clinically indicated  Ok to give dose IV diamox 250mg one time to offload bicarb - discussed with primary who will order   IS(500mL) and FV  Nebs PRN but hasn't used for the past 5 days, likely can discontinue soon if no issues     Subjective:   - denies sob,  "pain, n/v    Tracheal secretions: scant to none     Cough strength: strong    Ventilator weaning results: capping since 1/8    Clinical status discussed today with respiratory therapist       Medications:      dextrose      - MEDICATION INSTRUCTIONS -        aspirin  81 mg Oral Daily    bumetanide  2 mg Oral Daily    escitalopram  10 mg Oral Daily    melatonin  5 mg Oral QPM    miconazole   Topical BID    mirtazapine  15 mg Oral At Bedtime    multivitamin w/minerals  1 tablet Oral Daily    pantoprazole  40 mg Oral QAM AC    potassium chloride ER  30 mEq Oral BID    saccharomyces boulardii  250 mg Oral BID    sodium chloride (PF)  10 mL Intracatheter Q8H    Warfarin Therapy Reminder  1 each Oral See Admin Instructions         Exam/Data:   Vitals  /63 (BP Location: Left arm)   Pulse 68   Temp 97.9  F (36.6  C) (Oral)   Resp 20   Ht 1.613 m (5' 3.5\")   Wt 67.1 kg (147 lb 14.4 oz)   SpO2 95%   BMI 25.79 kg/m       No intake/output data recorded.  Weight change:     Vent Mode: -- (cap)  FiO2 (%): 21 %  Resp: 20    Capped on 1L NC    EXAM:  Gen: NAD in bedwith trach capped   HEENT: trach midline/intact, no stridor   CV: RRR, no m/g/r  Resp: CTAB, dimninished; non-labored, no wheeze  Abd: soft, nontender  Skin: b/l lower extremity rash(improved), large bulla on right dorsum(wrapped)  Ext: 1-2+ b/l lower extremity edema  Neuro: alert, follows commands     Labs:  ABG  Recent Labs   Lab 01/11/24  0611   PH 7.55*   PCO2 44   PO2 124*   HCO3 39*      Venous Blood Gas  Recent Labs   Lab 01/10/24  0619 01/06/24  0138   PHV 7.45 7.43   PCO2V 62* 56*   PO2V 33 31   HCO3V 40* 35*   SHAWN 18.4 13.0       Complete Blood Count   Recent Labs   Lab 01/11/24  0643 01/11/24  0611 01/10/24  0619 01/08/24  0527   WBC 8.7  --   --  8.3   HGB 8.5* 7.8* 9.0* 8.0*     --   --  438     Basic Metabolic Panel  Recent Labs   Lab 01/11/24  0643 01/11/24  0611 01/10/24  0619 01/10/24  0616 01/09/24  0853 01/09/24  0624 01/08/24  1405 " 01/08/24 0527 01/06/24  0138 01/05/24  0548    133* 137  --   --  134*  --  136   < > 138   POTASSIUM 3.5 3.7 2.6* 2.9*  --  3.3*   < > 2.7*   < > 3.9   CHLORIDE 90*  --   --   --   --  89*  --  91*  --  95*   CO2 37*  --   --   --   --  34*  --  35*  --  37*   BUN 14.8  --   --   --   --  12.9  --  13.1  --  24.0*   CR 1.11*  --   --   --   --  1.06*  --  1.04*  --  1.15*   GLC 93 96 92  --  117* 100*  --  87   < > 88    < > = values in this interval not displayed.     Liver Function Tests  Recent Labs   Lab 01/11/24  0643 01/10/24  0616 01/09/24 0624 01/08/24 0527   AST 23  --   --  22   ALT 10  --   --  10   ALKPHOS 111  --   --  112   BILITOTAL 0.6  --   --  0.7   ALBUMIN 3.4*  --   --  3.2*   INR 2.51* 2.72* 2.85* 2.89*     Coagulation Profile  Recent Labs   Lab 01/11/24  0643 01/10/24  0616 01/09/24 0624 01/08/24 0527   INR 2.51* 2.72* 2.85* 2.89*       Radiology: Personally reviewed; radiology read below    XR CHEST 2 VIEWS 1/8/2024  Feeding tube has been removed. Decreasing right lower lung atelectasis. Clear left lung. No other significant change. Tracheostomy. Right PICC tip in the low SVC. Sternotomy and mitral valve repair. Mild cardiomegaly with normal pulmonary vascularity. Moderate right hemidiaphragm elevation. No pleural effusion or pneumothorax.    XR CHEST 12/27/2023  Tracheostomy. Sternotomy and valve replacement. Enteric tube within the stomach. Right upper extremity PICC line tip in the distal SVC. Heart is mildly enlarged, unchanged. Elevation of the right hemidiaphragm, unchanged. Minimal atelectasis at the right lung base. Lungs otherwise appear clear    XR CHEST 12/19/2023  Poststernotomy changes with a prosthetic TVR and tracheostomy tube tube in place. Feeding tube can be seen coursing into the stomach. Right upper extremity PICC line catheter overlies the proximal right atrium. Elevation of the right hemidiaphragm, unchanged with small right effusion and right basilar and  likely middle lobe opacities, presumed atelectasis. Left lung is clear. Heart is slightly enlarged. No signs of failure.    Matt Kennedy CNP  Pulmonary Medicine  [facility]  Pager [phone]  Office [phone]

## 2024-01-11 NOTE — PROGRESS NOTES
[facility]    Medicine Progress Note - Hospitalist Service    Date of Admission:  12/15/2023    Brief summary:  Priya Funez is a 83 year old female with a PMHx of HFpEF, severe TR, permanent AF s/p AVN ablation with PPM implant 1/11/19 s/p extraction TV (transvenous) PPM and implant leadless PPM 7/31/23, emergent aortic dissection repair 1/4/19, HTN, CKD, NEDA, and obesity who presented to [facility] as an outpatient for elective TVR done on 11/28/2023.  Immediate postoperative course was complicated by acute hypoxic and hypercapnic respiratory failure requiring BiPAP and probable pneumonia and treated with 7 days of IV Zosyn, patient was transferred to surgical telemetry floor on 12/2/2020.  On 12/6/2023 she was transferred back to CVICU after RRT for acute hypoxic respiratory distress and subsequent respiratory arrest with short CPR, re-intubation, and ROSC achieved. She sustained mildly displaced and nondisplaced right 3-5 rib fractures with associated right chest wall hematoma and nondisplaced anterolateral left 4th and 5th rib fractures. She was treated with meropenem 7 day course. She also has hx of recent C diff and multiple loose stools, for which she completed a 7 day po vancomycin course (per antimicrobial stewardship team, more likely to be colonization with recent history and no apparent treatment). Patient was transiently hyperglycemic and treated with insulin infusion then transitioned to sliding scale insulin per protocol. Blood sugars remained stable, has not required insulin coverage.  She remained intubated in the CVICU until undergoing tracheostomy by Dr. Pitts on 12/13/23, doing well with trach dome and PS currently.  She was treated for volume overload with diuretics (Bumex and chlorothiazide) with subsequent hypernatremia and hypotension requiring FWF and pressor support.  Spironolactone, torsemide and metoprolol discontinued and midodrine started with now stable blood pressures. Pre-op weight 70.9 kg,  discharge weight 74.1 kg (163 lbs) on 12/15/2023.  Patient was discharged to LTACH 12/15/2023 for continuing vent weaning, therapies      LTACH course:  12/17: Vitals stable.  Scheduled midodrine discontinued with BP stable in the 140s.  Diuretic therapy with torsemide restarted given peripheral edema and volume status, however at lower dose of 10 mg daily (home dose 40 mg daily) titrate slowly based on BP response.  Resume metoprolol for A-fib rate control as well gradually.  Labs reviewed, stable electrolytes, creatinine 0.82. ProBNP 3710, down from 63215 and 74641.  Hgb 8.0 from 7.6.  INR 0.98 from 1.12.  Continue heparin and Coumadin together for now until INR therapeutic.  Started hydroxyzine for anxiety 12/17.  A.m. labs ordered.     12/18-12/24:  increased torsemide to 20 mg po daily due to peripheral edema on 12/18, having loose stools,  changed tube feeds to higher fiber formula and add imodium daily and prn.  Still with edema,  increase toresemide to 40 mg po daily on 12/19 and increased imodium to bid and prn, changed heparin to lovenox for pt comfort.  She is getting prn hydroxyzine for anxiety which we should try to titrate off as anticoholinergics are not optimal in the geriatric population. 12/22 switched to bumex 1 mg daily and increased to bumex 1 mg BID 12/23.  Encouraged leg elevation above heart. Give hydrochlorothiazide one dose 12/24 - if that doesn't help then would need to try IV diuretics.  INR above 2 for the first time on 12/24 so if above 2 on 12/25 then could stop the lovenox.  12/25 - 12/29.  Had extensive lower extremity edema.  Switched Bumex to IV and then increased it from Bumex 1 mg twice daily to Bumex 1.5 mg twice daily.  Has been diuresing well.  Developed dermatitis on the lower extremities.  Started hydrocortisone cream.  12/27, VBG revealed hypercapnia she was put back on the mechanical ventilator with improvement.  12/29. Continues to diurese well. Plan to monitor kidney  function with creatinine.  She remains on vent weaning phase 2.  Making some progress.  12/30-1/7:  increased bumex to 2 mg IV bid- changed on 12/30, some erythema, will monitor - may not  need to treat for cellulitis.  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  Increased urinary output, decreased swelling of LE.  On 1/1,  spoke with patient and she would like to be DNR.  She will go through with the swallow test tomorrow but has decided she no longer will have NJ tube in and will eat no matter what the test results are.  She agrees to continue with NJ tube tube until tomorrow.  She reports that her legs feel better.  On 1/2:  NJ discontinued, passed swallow test, diet changed to bite sized food and thin liquids  1/3:  walking around nursing station with PT and walker and 2L of O2 with trach capped, doing well, in much better spirits today.  Reports stool is getting more thick and wants to be off scheduled imodium, it was discontinued, she still has prn ordered.  Improved LE swelling and increased creatinine- decreased bumex to 1.5 IV bid.  1/6: still with LE edema despite aggressive IV diuresis.  Encouraged ambulation and leg elevation.  Weight plateauing - added metolazone for 2 doses and changed dosing times of bumex.  Decreased hydroxyzine dosing to minimize anticholinergic effect in elderly - especially with diuresis and wanting to avoid urinary retention.   1/8-1/9:  k is 2.7, will give extra 60 meq KCL and repeat K at 2pm-  K is 3.5, will follow up in morning.  2pm on 1/8 we had a care conference, all questions were answered.  She has cardio appt set for 2/2.      1/10: KCl 40mEq x1 doses today. Bumex IV -> PO 2mg daily. Likely decannulation tomorrow per pulm.   1/11: Decannulated today. K 3.5 today, no further repletion. Acetazolamide 250mg once.     Follow up  -cardio outpt appt 2/2     Assessment & Plan    Acute respiratory failure and arrest on 12/6/23   Acute hypercapnic and hypoxic respiratory failure requiring BiPAP  s/p tracheostomy 12/13/23  s/p Mechanical ventilation  Aspiration pneumonitis vs pneumonia  Possible aspiration event 12/1  Near complete plugging of left lower lobe on CT   Acute mildly displaced and nondisplaced right 3-5 rib fractures with associated right chest wall hematoma and nondisplaced anterolateral left fourth and fifth rib fractures  - weaning per pulmonology  -RT for tracheostomy cares, oxygenation, nebs, pulmonary therapies  - Tracheostomy sutures removed (12/18/2023), decannulated (1/11)  - Mucomyst, duoneb w/ RT   - SLP speaking valve as tolerated   - s/p acetazolamide 250mg IV once (1/11)     S/p respiratory arrest 12/6  Hx of severe TR s/p TVR  HFpEF  Hx of Afib s/p AVN ablation - PPM, upgraded to leadless 7/2023  Hypertension  Hyperlipidemia  Hx of aortic dissection repair 2019  Respiratory arrest 12/6   - Stable paced rhythm with underlying atrial flutter. On review of cardiology, underlying aflutter/afib has been present since at least June of this year. Device RN adjusted PPM to PTA settings rate 60 at [facility].   - Pre-op 9/8/23 echo: LVEF 55/60%, mildly dilated RV, normal function  - Echo 12/4 with LVEF 60-65%, normal RV function, TV mean gradient 5 mmHg, no TR  - Echo 12/7 with no significant change from previous with the exception of mild reduction of RV. RV free wall dyskinesia.   - ASA 81 mg daily  -was on scheduled midodrine 10 mg Q8H but stopped that 12/16/23 since BP in the 140s systolic and stable  -Continue midodrine 5 mg TID PRN for SBP<100   - Started Warfarin 12/15.    -Discontinued lovenox 12/26/2023. Continue with coumadin INR on goal. Pharmacy consulted for Coumadin dosing (INR goal 2-3)      Acute on chronic diastolic heart failure with Elevated BNP, improving   - Held PTA spironolactone, torsemide, metoprolol due to hypotension at prior hospital  - Echo 12/4 with  LVEF 60-65%, normal RV function, TV mean gradient 5 mmHg, no TR  - Echo 12/7 with no significant change from previous with the exception of mild reduction of RV. RV free wall dyskinesia.   - FLUID STATUS: Pre-op weight 70.9 kg, discharge weight 74.1 kg (163 lbs) on 12/15/2023 and 165 lbs on 12/16  -increased torsemide to 40 mg po daily on 12/19 then switched to bumex 12/22 and increased to bumex 1 mg BID on 12/23- increased to 1.5, now increased bumex to 2 mg IV bid on 12/30- decreased to 1.5 mg IV bid on 1/4 but now weight was plateauing so moved dosing times of bumex and gave one dose metolazone 1/7 AM 1 hour prior to bumex and repeat 1/8.  - bumex IV -> 2mg PO daily (1/11)  -proBNP trend appears improved from a peak of 33986 to now 3996 1/5  -has a new blister on her right foot likely due to fluid retention  -edema improving with leg elevation above heart and encouraged ambulation    blister on top of Right foot  - likely due to fluid retention  -blister popped on 12/30  -monitor for cellulitis    Multiple loose stools   Hx of C. Difficile (9/2023)  Severe malnutrition in the context of acute illness  - s/p tube feeds   -passed swallow on 1/2, diet changed from tube feeds to bite sized and thin liquids.    - PPI famotidine  - Bowel regimen discontinued due to continued loose stools  - Finished vancomycin po 7 day course   - PRN Zofran  4 mg PO or IV Q6H  - PRN Compazine 5 mg IV Q6H   - loperamide 2 mg BID and prn     Hypernatremia   Hypokalemia  CKD stage 3  Lactic acidosis, resolved  IMAN on CKD, resolved   Edema  Baseline creat ~1.2-1.3   FLUID STATUS: Pre-op weight 70.9 kg, discharge weight 74.1 kg (163 lbs).   - Elevated sodium- it is now normal  -On free water flushes 30ml q4h  - I/O has been inaccurate due to unmeasured stools and voids.  - Replete lytes per protocol  - KCl 30mEq BID with prn replacement  - Avoid/limit nephrotoxins as able  -daily weights    Stress induced hyperglycemia  T2DM  Hgb A1c 5.7%,  stable off meds  - PTA Jardiance on hold, resume once medically optimized   - Goal BG <180 for optimal healing     Stress induced leukocytosis  Probable aspiration pneumonitis vs pneumonia, resolved  - 11/30 Zosyn 7-day course for pneumonia (finished)   - 12/6 Given further decompensation, Vanco and meropenem was started on 12/6   - Finished meropenem 7 day course (12/12)   - Finished Vancomycin PO 7 day course (for c-diff) (12/13)  - Blood culture with NGTD   - Urine with Candida and sputum with Yeast, likely both colonizations.   - Urine culture with NGTD     Hx of recent C. Difficile  - C. Difficile toxin PCR positive 12/3 with negative antigen and negative toxin - per antimicrobial stewardship team, more likely to be colonization with recent history and no apparent treatment  - Finished oral vancomycin course on 12/13, still with diarrhea but no abd pain, leucocytosis, or fevers so monitoring for now  - Monitor fever curve, WBC, and inflammatory markers as appropriate  - Enteric precautions per infection prevention protocol      3rd, 4th right rib fractures due to CPR  -Continue mini thoracotomy precautions  -Judicious use of narcotics, de-escalate as able     Acute blood loss anemia  - Hgb has been stable.   -Hgb 7.6 on 12/16 and 8.0 on 12/17/2023  -Iron studies: Serum iron 29, iron binding capacity 216, saturation index 13%.  -Follow CBC with weekly labs.   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   -f/up outpt     Diet: Snacks/Supplements Adult: Ensure Clear; With Meals  Regular Diet Adult    DVT Prophylaxis: warfarin  Barrientos Catheter: Not  "present  Lines: PRESENT      PICC 12/07/23 Triple Lumen Right Basilic ok to use PICC-Site Assessment: WDL      Cardiac Monitoring: None  Code Status: No CPR- Do NOT Intubate      Clinically Significant Risk Factors        # Hypokalemia: Lowest K = 2.6 mmol/L in last 2 days, will replace as needed   # Hypocalcemia: Lowest iCa = 1.1 mg/dL in last 2 days, will monitor and replace as appropriate     # Hypoalbuminemia: Lowest albumin = 2.7 g/dL at 1/4/2024  5:50 AM, will monitor as appropriate     # Hypertension: Noted on problem list    # Acute heart failure with preserved ejection fraction: heart failure noted on problem list, last echo with EF >50%, and receiving IV diuretics       # Overweight: Estimated body mass index is 25.41 kg/m  as calculated from the following:    Height as of this encounter: 1.613 m (5' 3.5\").    Weight as of this encounter: 66.1 kg (145 lb 11.6 oz).   # Severe Malnutrition: based on nutrition assessment      # Financial/Environmental Concerns: none   # Pacemaker present  # History of CABG: noted on surgical history       Disposition Plan      Expected Discharge Date: 01/17/2024      Destination: home with family            Greg Jimenes MD  Hospitalist Service  LTACH  Securely message with [employer] (more info)  Text page via [employer] Paging/Directory   ______________________________________________________________________    Interval History   - no acute events ovn  - pt doing well today  - happy that trach is out now  - breathing well  - no other acute concerns    Physical Exam   Vital Signs: Temp: 97.5  F (36.4  C) Temp src: Oral BP: 126/59 Pulse: 61   Resp: 22 SpO2: 97 % O2 Device: Nasal cannula Oxygen Delivery: 1 LPM  Weight: 145 lbs 11.58 oz    GENERAL: no respiratory distress  HEENT: Head is normocephalic atraumatic eyes pupils appear equal round and reactive to light  NECK: Supple, Tracheostomy in place  LUNGS: clear to auscultation, decreased breath sounds at bases   HEART: S1 S2, Rate " and rhythm is regular. No murmurs, 2+ BLE edema  ABDOMEN: Soft, nontender, no distension. Bowel sounds are positive. No guarding or rebound.  :  stool coming out of vagina    EXTREMITIES: 2+ bilateral lower extremity edema noted   SKIN: popped blister R foot, erythema improved of b/l lower extremities   PSYCHIATRIC: Normal affect and cognition     Medical Decision Making       45 MINUTES SPENT BY ME on the date of service doing chart review, history, exam, documentation & further activities per the note.      Data   Lab Results   Component Value Date    WBC 6.1 12/28/2023    WBC 5.3 06/14/2021     Lab Results   Component Value Date    RBC 2.64 12/28/2023    RBC 4.33 06/14/2021     Lab Results   Component Value Date    HGB 7.8 12/28/2023    HGB 12.8 06/14/2021     Lab Results   Component Value Date    HCT 25.9 12/28/2023    HCT 40.1 06/14/2021     Lab Results   Component Value Date    MCV 98 12/28/2023    MCV 93 06/14/2021     Lab Results   Component Value Date    MCH 29.5 12/28/2023    MCH 29.6 06/14/2021     Lab Results   Component Value Date    MCHC 30.1 12/28/2023    MCHC 31.9 06/14/2021     Lab Results   Component Value Date    RDW 17.9 12/28/2023    RDW 13.5 06/14/2021     Lab Results   Component Value Date     12/28/2023     06/14/2021       Last Comprehensive Metabolic Panel:  Sodium   Date Value Ref Range Status   01/09/2024 134 (L) 135 - 145 mmol/L Final     Comment:     Reference intervals for this test were updated on 09/26/2023 to more accurately reflect our healthy population. There may be differences in the flagging of prior results with similar values performed with this method. Interpretation of those prior results can be made in the context of the updated reference intervals.    06/23/2021 138 133 - 144 mmol/L Final     Sodium POCT   Date Value Ref Range Status   01/11/2024 133 (L) 135 - 145 mmol/L Final     Potassium   Date Value Ref Range Status   12/13/2022 3.8 3.4 - 5.3 mmol/L  Final   06/23/2021 4.5 3.4 - 5.3 mmol/L Final     Potassium POCT   Date Value Ref Range Status   01/11/2024 3.7 3.5 - 5.0 mmol/L Final     Chloride   Date Value Ref Range Status   01/09/2024 89 (L) 98 - 107 mmol/L Final   12/13/2022 99 94 - 109 mmol/L Final   06/23/2021 104 94 - 109 mmol/L Final     Carbon Dioxide   Date Value Ref Range Status   06/23/2021 31 20 - 32 mmol/L Final     Carbon Dioxide (CO2)   Date Value Ref Range Status   01/09/2024 34 (H) 22 - 29 mmol/L Final   12/13/2022 33 (H) 20 - 32 mmol/L Final     Anion Gap   Date Value Ref Range Status   01/09/2024 11 7 - 15 mmol/L Final   12/13/2022 7 3 - 14 mmol/L Final   06/23/2021 3 3 - 14 mmol/L Final     Glucose   Date Value Ref Range Status   12/13/2022 84 70 - 99 mg/dL Final   06/23/2021 86 70 - 99 mg/dL Final     GLUCOSE BY METER POCT   Date Value Ref Range Status   01/09/2024 117 (H) 70 - 99 mg/dL Final     Glucose Whole Blood POCT   Date Value Ref Range Status   01/11/2024 96 70 - 125 mg/dL Final     Urea Nitrogen   Date Value Ref Range Status   01/09/2024 12.9 8.0 - 23.0 mg/dL Final   12/13/2022 31 (H) 7 - 30 mg/dL Final   06/23/2021 30 7 - 30 mg/dL Final     Creatinine   Date Value Ref Range Status   01/09/2024 1.06 (H) 0.51 - 0.95 mg/dL Final   06/23/2021 1.29 (H) 0.52 - 1.04 mg/dL Final     GFR Estimate   Date Value Ref Range Status   01/09/2024 53 (L) >60 mL/min/1.73m2 Final   06/23/2021 39 (L) >60 mL/min/[1.73_m2] Final     Comment:     Non  GFR Calc  Starting 12/18/2018, serum creatinine based estimated GFR (eGFR) will be   calculated using the Chronic Kidney Disease Epidemiology Collaboration   (CKD-EPI) equation.       Calcium   Date Value Ref Range Status   01/09/2024 8.5 (L) 8.8 - 10.2 mg/dL Final   06/23/2021 9.2 8.5 - 10.1 mg/dL Final     Bilirubin Total   Date Value Ref Range Status   01/08/2024 0.7 <=1.2 mg/dL Final   04/04/2019 1.0 0.2 - 1.3 mg/dL Final     Alkaline Phosphatase   Date Value Ref Range Status    01/08/2024 112 40 - 150 U/L Final     Comment:     Reference intervals for this test were updated on 11/14/2023 to more accurately reflect our healthy population. There may be differences in the flagging of prior results with similar values performed with this method. Interpretation of those prior results can be made in the context of the updated reference intervals.   04/04/2019 104 40 - 150 U/L Final     ALT   Date Value Ref Range Status   01/08/2024 10 0 - 50 U/L Final     Comment:     Reference intervals for this test were updated on 6/12/2023 to more accurately reflect our healthy population. There may be differences in the flagging of prior results with similar values performed with this method. Interpretation of those prior results can be made in the context of the updated reference intervals.     06/14/2021 22 0 - 50 U/L Final     AST   Date Value Ref Range Status   01/08/2024 22 0 - 45 U/L Final     Comment:     Reference intervals for this test were updated on 6/12/2023 to more accurately reflect our healthy population. There may be differences in the flagging of prior results with similar values performed with this method. Interpretation of those prior results can be made in the context of the updated reference intervals.   04/04/2019 27 0 - 45 U/L Final

## 2024-01-11 NOTE — PLAN OF CARE
Problem: Adult Inpatient Plan of Care  Goal: Optimal Comfort and Wellbeing  Outcome: Progressing  Intervention: Provide Person-Centered Care  Recent Flowsheet Documentation  Taken 1/11/2024 0200 by Judy Pino RN  Trust Relationship/Rapport:   care explained   choices provided   emotional support provided     Problem: Enteral Nutrition  Goal: Safe, Effective Therapy Delivery  Outcome: Progressing   Goal Outcome Evaluation:         Vitals stable. C/o of generalized discomfort at bedtime and was given PRN Tylenol which was effective. Able to reposition independently . Slept  majority of the shift.

## 2024-01-11 NOTE — PROGRESS NOTES
Patient was de cannulated on room air , IL /NC was placed on during the sleep , SPO2 went down to 87% during the sleep .  Cont current plan

## 2024-01-12 NOTE — PROGRESS NOTES
[facility]    Medicine Progress Note - Hospitalist Service    Date of Admission:  12/15/2023    Brief summary:  Priya Funez is a 83 year old female with a PMHx of HFpEF, severe TR, permanent AF s/p AVN ablation with PPM implant 1/11/19 s/p extraction TV (transvenous) PPM and implant leadless PPM 7/31/23, emergent aortic dissection repair 1/4/19, HTN, CKD, NEDA, and obesity who presented to [facility] as an outpatient for elective TVR done on 11/28/2023.  Immediate postoperative course was complicated by acute hypoxic and hypercapnic respiratory failure requiring BiPAP and probable pneumonia and treated with 7 days of IV Zosyn, patient was transferred to surgical telemetry floor on 12/2/2020.  On 12/6/2023 she was transferred back to CVICU after RRT for acute hypoxic respiratory distress and subsequent respiratory arrest with short CPR, re-intubation, and ROSC achieved. She sustained mildly displaced and nondisplaced right 3-5 rib fractures with associated right chest wall hematoma and nondisplaced anterolateral left 4th and 5th rib fractures. She was treated with meropenem 7 day course. She also has hx of recent C diff and multiple loose stools, for which she completed a 7 day po vancomycin course (per antimicrobial stewardship team, more likely to be colonization with recent history and no apparent treatment). Patient was transiently hyperglycemic and treated with insulin infusion then transitioned to sliding scale insulin per protocol. Blood sugars remained stable, has not required insulin coverage.  She remained intubated in the CVICU until undergoing tracheostomy by Dr. Pitts on 12/13/23, doing well with trach dome and PS currently.  She was treated for volume overload with diuretics (Bumex and chlorothiazide) with subsequent hypernatremia and hypotension requiring FWF and pressor support.  Spironolactone, torsemide and metoprolol discontinued and midodrine started with now stable blood pressures. Pre-op weight 70.9 kg,  discharge weight 74.1 kg (163 lbs) on 12/15/2023.  Patient was discharged to LTACH 12/15/2023 for continuing vent weaning, therapies      LTACH course:  12/17: Vitals stable.  Scheduled midodrine discontinued with BP stable in the 140s.  Diuretic therapy with torsemide restarted given peripheral edema and volume status, however at lower dose of 10 mg daily (home dose 40 mg daily) titrate slowly based on BP response.  Resume metoprolol for A-fib rate control as well gradually.  Labs reviewed, stable electrolytes, creatinine 0.82. ProBNP 3710, down from 73414 and 03261.  Hgb 8.0 from 7.6.  INR 0.98 from 1.12.  Continue heparin and Coumadin together for now until INR therapeutic.  Started hydroxyzine for anxiety 12/17.  A.m. labs ordered.     12/18-12/24:  increased torsemide to 20 mg po daily due to peripheral edema on 12/18, having loose stools,  changed tube feeds to higher fiber formula and add imodium daily and prn.  Still with edema,  increase toresemide to 40 mg po daily on 12/19 and increased imodium to bid and prn, changed heparin to lovenox for pt comfort.  She is getting prn hydroxyzine for anxiety which we should try to titrate off as anticoholinergics are not optimal in the geriatric population. 12/22 switched to bumex 1 mg daily and increased to bumex 1 mg BID 12/23.  Encouraged leg elevation above heart. Give hydrochlorothiazide one dose 12/24 - if that doesn't help then would need to try IV diuretics.  INR above 2 for the first time on 12/24 so if above 2 on 12/25 then could stop the lovenox.  12/25 - 12/29.  Had extensive lower extremity edema.  Switched Bumex to IV and then increased it from Bumex 1 mg twice daily to Bumex 1.5 mg twice daily.  Has been diuresing well.  Developed dermatitis on the lower extremities.  Started hydrocortisone cream.  12/27, VBG revealed hypercapnia she was put back on the mechanical ventilator with improvement.  12/29. Continues to diurese well. Plan to monitor kidney  function with creatinine.  She remains on vent weaning phase 2.  Making some progress.  12/30-1/7:  increased bumex to 2 mg IV bid- changed on 12/30, some erythema, will monitor - may not  need to treat for cellulitis.  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  Increased urinary output, decreased swelling of LE.  On 1/1,  spoke with patient and she would like to be DNR.  She will go through with the swallow test tomorrow but has decided she no longer will have NJ tube in and will eat no matter what the test results are.  She agrees to continue with NJ tube tube until tomorrow.  She reports that her legs feel better.  On 1/2:  NJ discontinued, passed swallow test, diet changed to bite sized food and thin liquids  1/3:  walking around nursing station with PT and walker and 2L of O2 with trach capped, doing well, in much better spirits today.  Reports stool is getting more thick and wants to be off scheduled imodium, it was discontinued, she still has prn ordered.  Improved LE swelling and increased creatinine- decreased bumex to 1.5 IV bid.  1/6: still with LE edema despite aggressive IV diuresis.  Encouraged ambulation and leg elevation.  Weight plateauing - added metolazone for 2 doses and changed dosing times of bumex.  Decreased hydroxyzine dosing to minimize anticholinergic effect in elderly - especially with diuresis and wanting to avoid urinary retention.   1/8-1/9:  k is 2.7, will give extra 60 meq KCL and repeat K at 2pm-  K is 3.5, will follow up in morning.  2pm on 1/8 we had a care conference, all questions were answered.  She has cardio appt set for 2/2.      1/10: KCl 40mEq x1 doses today. Bumex IV -> PO 2mg daily. Likely decannulation tomorrow per pulm.   1/11: Decannulated today. K 3.5 today, no further repletion. Acetazolamide 250mg once.   1/12: Stable. K 2.7, additional 40mEq given, increase to 40mEq  BID tomorrow, repeat level in AM.     Follow up  -cardio outpt appt 2/2     Assessment & Plan   Acute respiratory failure and arrest on 12/6/23   Acute hypercapnic and hypoxic respiratory failure requiring BiPAP  s/p tracheostomy 12/13/23  s/p Mechanical ventilation  Aspiration pneumonitis vs pneumonia  Possible aspiration event 12/1  Near complete plugging of left lower lobe on CT   Acute mildly displaced and nondisplaced right 3-5 rib fractures with associated right chest wall hematoma and nondisplaced anterolateral left fourth and fifth rib fractures  - weaning per pulmonology  -RT for tracheostomy cares, oxygenation, nebs, pulmonary therapies  - Tracheostomy sutures removed (12/18/2023), decannulated (1/11)  - Mucomyst, duoneb w/ RT   - SLP speaking valve as tolerated   - s/p acetazolamide 250mg IV once (1/11)     S/p respiratory arrest 12/6  Hx of severe TR s/p TVR  HFpEF  Hx of Afib s/p AVN ablation - PPM, upgraded to leadless 7/2023  Hypertension  Hyperlipidemia  Hx of aortic dissection repair 2019  Respiratory arrest 12/6   - Stable paced rhythm with underlying atrial flutter. On review of cardiology, underlying aflutter/afib has been present since at least June of this year. Device RN adjusted PPM to PTA settings rate 60 at [facility].   - Pre-op 9/8/23 echo: LVEF 55/60%, mildly dilated RV, normal function  - Echo 12/4 with LVEF 60-65%, normal RV function, TV mean gradient 5 mmHg, no TR  - Echo 12/7 with no significant change from previous with the exception of mild reduction of RV. RV free wall dyskinesia.   - ASA 81 mg daily  -was on scheduled midodrine 10 mg Q8H but stopped that 12/16/23 since BP in the 140s systolic and stable  -Continue midodrine 5 mg TID PRN for SBP<100   - Started Warfarin 12/15.    -Discontinued lovenox 12/26/2023. Continue with coumadin INR on goal. Pharmacy consulted for Coumadin dosing (INR goal 2-3)      Acute on chronic diastolic heart failure with Elevated BNP, improving   - Held  PTA spironolactone, torsemide, metoprolol due to hypotension at prior hospital  - Echo 12/4 with LVEF 60-65%, normal RV function, TV mean gradient 5 mmHg, no TR  - Echo 12/7 with no significant change from previous with the exception of mild reduction of RV. RV free wall dyskinesia.   - FLUID STATUS: Pre-op weight 70.9 kg, discharge weight 74.1 kg (163 lbs) on 12/15/2023 and 165 lbs on 12/16  -increased torsemide to 40 mg po daily on 12/19 then switched to bumex 12/22 and increased to bumex 1 mg BID on 12/23- increased to 1.5, now increased bumex to 2 mg IV bid on 12/30- decreased to 1.5 mg IV bid on 1/4 but now weight was plateauing so moved dosing times of bumex and gave one dose metolazone 1/7 AM 1 hour prior to bumex and repeat 1/8.  - bumex 2mg PO daily (1/11)  -proBNP trend appears improved from a peak of 19242 to now 3996 1/5  -has a new blister on her right foot likely due to fluid retention  -edema improving with leg elevation above heart and encouraged ambulation    blister on top of Right foot  - likely due to fluid retention  -blister popped on 12/30  -monitor for cellulitis    Multiple loose stools   Hx of C. Difficile (9/2023)  Severe malnutrition in the context of acute illness  - s/p tube feeds   -passed swallow on 1/2, diet changed from tube feeds to bite sized and thin liquids.    - PPI famotidine  - Bowel regimen discontinued due to continued loose stools  - Finished vancomycin po 7 day course   - PRN Zofran  4 mg PO or IV Q6H  - PRN Compazine 5 mg IV Q6H   - loperamide 2 mg BID and prn     Hypernatremia   Hypokalemia  CKD stage 3  Lactic acidosis, resolved  IMAN on CKD, resolved   Edema  Baseline creat ~1.2-1.3   FLUID STATUS: Pre-op weight 70.9 kg, discharge weight 74.1 kg (163 lbs).   - Elevated sodium- it is now normal  -On free water flushes 30ml q4h  - I/O has been inaccurate due to unmeasured stools and voids.  - Replete lytes per protocol  - increase to KCl 40mEq BID (1/13) with prn  replacement  - Avoid/limit nephrotoxins as able  -daily weights    Stress induced hyperglycemia  T2DM  Hgb A1c 5.7%, stable off meds  - PTA Jardiance on hold, resume once medically optimized   - Goal BG <180 for optimal healing     Stress induced leukocytosis  Probable aspiration pneumonitis vs pneumonia, resolved  - 11/30 Zosyn 7-day course for pneumonia (finished)   - 12/6 Given further decompensation, Vanco and meropenem was started on 12/6   - Finished meropenem 7 day course (12/12)   - Finished Vancomycin PO 7 day course (for c-diff) (12/13)  - Blood culture with NGTD   - Urine with Candida and sputum with Yeast, likely both colonizations.   - Urine culture with NGTD     Hx of recent C. Difficile  - C. Difficile toxin PCR positive 12/3 with negative antigen and negative toxin - per antimicrobial stewardship team, more likely to be colonization with recent history and no apparent treatment  - Finished oral vancomycin course on 12/13, still with diarrhea but no abd pain, leucocytosis, or fevers so monitoring for now  - Monitor fever curve, WBC, and inflammatory markers as appropriate  - Enteric precautions per infection prevention protocol      3rd, 4th right rib fractures due to CPR  -Continue mini thoracotomy precautions  -Judicious use of narcotics, de-escalate as able     Acute blood loss anemia  - Hgb has been stable.   -Hgb 7.6 on 12/16 and 8.0 on 12/17/2023  -Iron studies: Serum iron 29, iron binding capacity 216, saturation index 13%.  -Follow CBC with weekly labs.   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   -f/up outpt     Diet:  "Snacks/Supplements Adult: Ensure Clear; With Meals  Regular Diet Adult    DVT Prophylaxis: warfarin  Barrientos Catheter: Not present  Lines: PRESENT      PICC 12/07/23 Triple Lumen Right Basilic ok to use PICC-Site Assessment: WDL      Cardiac Monitoring: None  Code Status: No CPR- Do NOT Intubate      Clinically Significant Risk Factors          # Hypocalcemia: Lowest iCa = 1.1 mg/dL in last 2 days, will monitor and replace as appropriate     # Hypoalbuminemia: Lowest albumin = 2.7 g/dL at 1/4/2024  5:50 AM, will monitor as appropriate     # Hypertension: Noted on problem list    # Acute heart failure with preserved ejection fraction: heart failure noted on problem list, last echo with EF >50%, and receiving IV diuretics       # Overweight: Estimated body mass index is 25.83 kg/m  as calculated from the following:    Height as of this encounter: 1.613 m (5' 3.5\").    Weight as of this encounter: 67.2 kg (148 lb 2.4 oz).   # Severe Malnutrition: based on nutrition assessment      # Financial/Environmental Concerns: none   # Pacemaker present  # History of CABG: noted on surgical history       Disposition Plan     Expected Discharge Date: 01/17/2024      Destination: home with family            Greg Jimenes MD  Hospitalist Service  LTACH  Securely message with [employer] (more info)  Text page via [email or web address] Paging/Directory   ______________________________________________________________________    Interval History   - no acute events ovn  - pt doing well today  - seen with son in room  - breathing well since trach out  - discussed K levels and increase in scheduled dose  - pt asking about restarting her torsemide, discussed that this can be considered after discharge since we have her fluid balance stable now  - no other acute concerns    Physical Exam   Vital Signs: Temp: 98.1  F (36.7  C) Temp src: Oral BP: 132/59 Pulse: 63   Resp: 20 SpO2: 97 % O2 Device: Nasal cannula with humidification Oxygen Delivery: 1 LPM  Weight: " 148 lbs 2.39 oz    GENERAL: no respiratory distress  HEENT: Head is normocephalic atraumatic eyes pupils appear equal round and reactive to light  NECK: Supple, Tracheostomy in place  LUNGS: clear to auscultation, decreased breath sounds at bases   HEART: S1 S2, Rate and rhythm is regular. No murmurs, 2+ BLE edema  ABDOMEN: Soft, nontender, no distension. Bowel sounds are positive. No guarding or rebound.  :  stool coming out of vagina    EXTREMITIES: 2+ bilateral lower extremity edema noted   SKIN: popped blister R foot, erythema improved of b/l lower extremities   PSYCHIATRIC: Normal affect and cognition     Medical Decision Making       45 MINUTES SPENT BY ME on the date of service doing chart review, history, exam, documentation & further activities per the note.      Data   Lab Results   Component Value Date    WBC 6.1 12/28/2023    WBC 5.3 06/14/2021     Lab Results   Component Value Date    RBC 2.64 12/28/2023    RBC 4.33 06/14/2021     Lab Results   Component Value Date    HGB 7.8 12/28/2023    HGB 12.8 06/14/2021     Lab Results   Component Value Date    HCT 25.9 12/28/2023    HCT 40.1 06/14/2021     Lab Results   Component Value Date    MCV 98 12/28/2023    MCV 93 06/14/2021     Lab Results   Component Value Date    MCH 29.5 12/28/2023    MCH 29.6 06/14/2021     Lab Results   Component Value Date    MCHC 30.1 12/28/2023    MCHC 31.9 06/14/2021     Lab Results   Component Value Date    RDW 17.9 12/28/2023    RDW 13.5 06/14/2021     Lab Results   Component Value Date     12/28/2023     06/14/2021       Last Comprehensive Metabolic Panel:  Sodium   Date Value Ref Range Status   01/11/2024 136 135 - 145 mmol/L Final     Comment:     Reference intervals for this test were updated on 09/26/2023 to more accurately reflect our healthy population. There may be differences in the flagging of prior results with similar values performed with this method. Interpretation of those prior results can be  made in the context of the updated reference intervals.    06/23/2021 138 133 - 144 mmol/L Final     Sodium POCT   Date Value Ref Range Status   01/11/2024 133 (L) 135 - 145 mmol/L Final     Potassium   Date Value Ref Range Status   01/11/2024 3.5 3.4 - 5.3 mmol/L Final   12/13/2022 3.8 3.4 - 5.3 mmol/L Final   06/23/2021 4.5 3.4 - 5.3 mmol/L Final     Potassium POCT   Date Value Ref Range Status   01/11/2024 3.7 3.5 - 5.0 mmol/L Final     Chloride   Date Value Ref Range Status   01/11/2024 90 (L) 98 - 107 mmol/L Final   12/13/2022 99 94 - 109 mmol/L Final   06/23/2021 104 94 - 109 mmol/L Final     Carbon Dioxide   Date Value Ref Range Status   06/23/2021 31 20 - 32 mmol/L Final     Carbon Dioxide (CO2)   Date Value Ref Range Status   01/11/2024 37 (H) 22 - 29 mmol/L Final   12/13/2022 33 (H) 20 - 32 mmol/L Final     Anion Gap   Date Value Ref Range Status   01/11/2024 9 7 - 15 mmol/L Final   12/13/2022 7 3 - 14 mmol/L Final   06/23/2021 3 3 - 14 mmol/L Final     Glucose   Date Value Ref Range Status   01/11/2024 93 70 - 99 mg/dL Final   12/13/2022 84 70 - 99 mg/dL Final   06/23/2021 86 70 - 99 mg/dL Final     GLUCOSE BY METER POCT   Date Value Ref Range Status   01/09/2024 117 (H) 70 - 99 mg/dL Final     Glucose Whole Blood POCT   Date Value Ref Range Status   01/11/2024 96 70 - 125 mg/dL Final     Urea Nitrogen   Date Value Ref Range Status   01/11/2024 14.8 8.0 - 23.0 mg/dL Final   12/13/2022 31 (H) 7 - 30 mg/dL Final   06/23/2021 30 7 - 30 mg/dL Final     Creatinine   Date Value Ref Range Status   01/11/2024 1.11 (H) 0.51 - 0.95 mg/dL Final   06/23/2021 1.29 (H) 0.52 - 1.04 mg/dL Final     GFR Estimate   Date Value Ref Range Status   01/11/2024 50 (L) >60 mL/min/1.73m2 Final   06/23/2021 39 (L) >60 mL/min/[1.73_m2] Final     Comment:     Non  GFR Calc  Starting 12/18/2018, serum creatinine based estimated GFR (eGFR) will be   calculated using the Chronic Kidney Disease Epidemiology Collaboration    (CKD-EPI) equation.       Calcium   Date Value Ref Range Status   01/11/2024 9.1 8.8 - 10.2 mg/dL Final   06/23/2021 9.2 8.5 - 10.1 mg/dL Final     Bilirubin Total   Date Value Ref Range Status   01/11/2024 0.6 <=1.2 mg/dL Final   04/04/2019 1.0 0.2 - 1.3 mg/dL Final     Alkaline Phosphatase   Date Value Ref Range Status   01/11/2024 111 40 - 150 U/L Final     Comment:     Reference intervals for this test were updated on 11/14/2023 to more accurately reflect our healthy population. There may be differences in the flagging of prior results with similar values performed with this method. Interpretation of those prior results can be made in the context of the updated reference intervals.   04/04/2019 104 40 - 150 U/L Final     ALT   Date Value Ref Range Status   01/11/2024 10 0 - 50 U/L Final     Comment:     Reference intervals for this test were updated on 6/12/2023 to more accurately reflect our healthy population. There may be differences in the flagging of prior results with similar values performed with this method. Interpretation of those prior results can be made in the context of the updated reference intervals.     06/14/2021 22 0 - 50 U/L Final     AST   Date Value Ref Range Status   01/11/2024 23 0 - 45 U/L Final     Comment:     Reference intervals for this test were updated on 6/12/2023 to more accurately reflect our healthy population. There may be differences in the flagging of prior results with similar values performed with this method. Interpretation of those prior results can be made in the context of the updated reference intervals.   04/04/2019 27 0 - 45 U/L Final

## 2024-01-12 NOTE — PLAN OF CARE
Problem: Adult Inpatient Plan of Care  Goal: Optimal Comfort and Wellbeing  Outcome: Progressing    Patient is alert, oriented x 4, denies any pain or discomfort. V/s stable. Patient appetite is good with adequate fluids intake. Patient participated in therapy sessions. Patient reports her primary physician told her she could discharge next week if she continues to make progress. Patient is visiting with a family member at reporting time. Will continue to monitor.

## 2024-01-12 NOTE — PROGRESS NOTES
Pt was decannulated today on day shift and is on 1 lpm nc with cont oximetry, marbella well. BS clear/diminish. SAT %, HR 60-63, RR 20-22      Plan: Cont monitor

## 2024-01-12 NOTE — PROGRESS NOTES
Pt is doing fine since her decannulation yesterday. Sat 98%, HR 61, RR 20, BS clear/diminished. RT will cont to monitor.

## 2024-01-12 NOTE — PROGRESS NOTES
Pulmonary Note     Chart reviewed. Patient doing well following decannulation. Pulmonary consult service will sign off, but please call any time if needed.  Referral placed for ICU survivorship clinic.    Matt Kennedy CNP  Pulmonary Medicine  [facility]  Pager [phone]  Office [phone]

## 2024-01-12 NOTE — PLAN OF CARE
Problem: Adult Inpatient Plan of Care  Goal: Absence of Hospital-Acquired Illness or Injury  Outcome: Progressing  Intervention: Identify and Manage Fall Risk  Recent Flowsheet Documentation  Taken 1/12/2024 0256 by Shakira Figueroa RN  Safety Promotion/Fall Prevention:   activity supervised   room near nurse's station   safety round/check completed   lighting adjusted  Intervention: Prevent Skin Injury  Recent Flowsheet Documentation  Taken 1/12/2024 0240 by Shakira Figueroa RN  Body Position: (back to bed) position changed independently  Taken 1/12/2024 0230 by Shakira Figueroa RN  Body Position: (walked to the bathroom) --  Intervention: Prevent Infection  Recent Flowsheet Documentation  Taken 1/12/2024 0256 by Shakira Figueroa RN  Infection Prevention:   hand hygiene promoted   rest/sleep promoted   Goal Outcome Evaluation:  Vital signs were stable. Pt. complained of back pain, prn tylenol given with effect. Pt. ambulated to the bathroom couple of times in the night to empty the bladder. Continue to monitor.   Shakira Figueroa RN

## 2024-01-13 NOTE — PLAN OF CARE
Problem: Adult Inpatient Plan of Care  Goal: Optimal Comfort and Wellbeing  Outcome: Progressing  Intervention: Monitor Pain and Promote Comfort  Recent Flowsheet Documentation  Taken 1/13/2024 0117 by Darcy Bean RN  Pain Management Interventions:   medication (see MAR)   ambulation/increased activity  Intervention: Provide Person-Centered Care  Recent Flowsheet Documentation  Taken 1/13/2024 0025 by Darcy Bean RN  Trust Relationship/Rapport: care explained     Problem: Anxiety Signs/Symptoms  Goal: Improved Sleep (Anxiety Signs/Symptoms)  Outcome: Progressing  Flowsheets (Taken 1/13/2024 0221)  Mutually Determined Action Steps (Improved Sleep): sleeps 4-6 hours at night     Problem: Skin Injury Risk Increased  Goal: Skin Health and Integrity  Outcome: Progressing  Intervention: Optimize Skin Protection  Recent Flowsheet Documentation  Taken 1/13/2024 0025 by Darcy Bean RN  Activity Management: ambulated to bathroom     Problem: Diarrhea  Goal: Effective Diarrhea Management  Outcome: Progressing  Intervention: Manage Diarrhea  Recent Flowsheet Documentation  Taken 1/13/2024 0025 by Darcy Bean RN  Fluid/Electrolyte Management: fluids provided  Isolation Precautions: contact precautions maintained  Medication Review/Management: medications reviewed   Goal Outcome Evaluation:       Patient was decannulated on 1/12/24. She reported air coming out through the old trach stoma when coughing, the stoma is still healing, hasn't closed yet. Advised to place two fingers over the dressing to keep it in placet when coughing. Is on 1 Liter oxygen per nasal cannula. Denied shortness of breath, complained of hospital bed not supporting her back, causing discomfort when lying down for extended time during the night, felt better when she got up, did some stretching while ambulating to toilet, tylenol was given, with good effect.

## 2024-01-13 NOTE — PLAN OF CARE
Goal Outcome Evaluation:       Writer found PICC dressing dislodged and stabilizer open.  SWAT nurse reinforced dressing and orders for xray obtained.    Results of xray as follows:  IMPRESSION: Right PICC line remains in place though tip is difficult to visualize, at least to the level of low SVC. Opacity of elevated right hemidiaphragm may obscure tip extends deeper.  Sternal wires and mediastinal clips with prosthetic cardiac valve and implantable leadless pacemaker present. Mild cardiomegaly. Linear atelectasis or scar at right lung base unchanged. Left lung remains clear.

## 2024-01-13 NOTE — PROGRESS NOTES
Pt was decannulated today on day shift and is on 1 lpm nc with cont oximetry, marbella well. BS clear/diminish. SAT %, HR 60-62 , RR 20-22      Plan: Cont monitor

## 2024-01-13 NOTE — PROVIDER NOTIFICATION
01/13/24 1658   Mode: CPAP/ BiPAP/ AVAPS/ AVAPS AE   CPAP/BiPAP/ AVAPS/ AVAPS AE Mode BiPAP S/T   Equipment   Device V60   Device Serial Number [id number]   CPAP/BiPAP/Settings   $CPAP/BiPAP Initial completed   BIPAP/CPAP On Standby On   IPAP/EPAP (cmH2O) 12/6   Rate (breaths/min) 12   Oxygen (%) 24   Timed Inspiration (sec) 0.8   IPAP RISE  Settings (V60) 3   CPAP/BiPAP Patient Parameters   IPAP (cm H2O) 12 cmH2O   EPAP (cm H2O) 6 cmH2O   Pressure Support (cm H2O) 6 cmH2O   RR Total (breaths/min) 23 breaths/min   Vt (mL) 390 mL   Minute Ventilation (L/min) 8.8 L/min   Peak Inspiratory Pressure (cm H2O) 12 cmH2O   Pt.  Leak (L/min) 36 L/min   CPAP/BiPAP/AVAPS/AVAPS AE Alarms   High Pressure (cm H2O) 50 cmH2O   Low Pressure (cm H2O) 5   Low Pressure Delay (sec) 20 sec   Lo Min Vent 3   High Rate (breaths/min) 30 breaths/min   Low Rate (breaths/min) 10   Humidifier Checked N/A   Respiratory WDL   Rhythm/Pattern, Respiratory depth regular;pattern regular   Breath Sounds   Breath Sounds All Fields   All Lung Fields Breath Sounds wheezes, expiratory     Pt was placed on the BiPAP d/t increased WOB. PRN neb was given but did not help. Pt cont to c/o SOB. After few minutes, pt said her breathing improved a little.

## 2024-01-13 NOTE — PROGRESS NOTES
[facility]    Medicine Progress Note - Hospitalist Service    Date of Admission:  12/15/2023    Brief summary:  Priya Funez is a 83 year old female with a PMHx of HFpEF, severe TR, permanent AF s/p AVN ablation with PPM implant 1/11/19 s/p extraction TV (transvenous) PPM and implant leadless PPM 7/31/23, emergent aortic dissection repair 1/4/19, HTN, CKD, NEDA, and obesity who presented to [facility] as an outpatient for elective TVR done on 11/28/2023.  Immediate postoperative course was complicated by acute hypoxic and hypercapnic respiratory failure requiring BiPAP and probable pneumonia and treated with 7 days of IV Zosyn, patient was transferred to surgical telemetry floor on 12/2/2020.  On 12/6/2023 she was transferred back to CVICU after RRT for acute hypoxic respiratory distress and subsequent respiratory arrest with short CPR, re-intubation, and ROSC achieved. She sustained mildly displaced and nondisplaced right 3-5 rib fractures with associated right chest wall hematoma and nondisplaced anterolateral left 4th and 5th rib fractures. She was treated with meropenem 7 day course. She also has hx of recent C diff and multiple loose stools, for which she completed a 7 day po vancomycin course (per antimicrobial stewardship team, more likely to be colonization with recent history and no apparent treatment). Patient was transiently hyperglycemic and treated with insulin infusion then transitioned to sliding scale insulin per protocol. Blood sugars remained stable, has not required insulin coverage.  She remained intubated in the CVICU until undergoing tracheostomy by Dr. Pitts on 12/13/23, doing well with trach dome and PS currently.  She was treated for volume overload with diuretics (Bumex and chlorothiazide) with subsequent hypernatremia and hypotension requiring FWF and pressor support.  Spironolactone, torsemide and metoprolol discontinued and midodrine started with now stable blood pressures. Pre-op weight 70.9 kg,  discharge weight 74.1 kg (163 lbs) on 12/15/2023.  Patient was discharged to LTACH 12/15/2023 for continuing vent weaning, therapies      LTACH course:  12/17: Vitals stable.  Scheduled midodrine discontinued with BP stable in the 140s.  Diuretic therapy with torsemide restarted given peripheral edema and volume status, however at lower dose of 10 mg daily (home dose 40 mg daily) titrate slowly based on BP response.  Resume metoprolol for A-fib rate control as well gradually.  Labs reviewed, stable electrolytes, creatinine 0.82. ProBNP 3710, down from 22407 and 39742.  Hgb 8.0 from 7.6.  INR 0.98 from 1.12.  Continue heparin and Coumadin together for now until INR therapeutic.  Started hydroxyzine for anxiety 12/17.  A.m. labs ordered.     12/18-12/24:  increased torsemide to 20 mg po daily due to peripheral edema on 12/18, having loose stools,  changed tube feeds to higher fiber formula and add imodium daily and prn.  Still with edema,  increase toresemide to 40 mg po daily on 12/19 and increased imodium to bid and prn, changed heparin to lovenox for pt comfort.  She is getting prn hydroxyzine for anxiety which we should try to titrate off as anticoholinergics are not optimal in the geriatric population. 12/22 switched to bumex 1 mg daily and increased to bumex 1 mg BID 12/23.  Encouraged leg elevation above heart. Give hydrochlorothiazide one dose 12/24 - if that doesn't help then would need to try IV diuretics.  INR above 2 for the first time on 12/24 so if above 2 on 12/25 then could stop the lovenox.  12/25 - 12/29.  Had extensive lower extremity edema.  Switched Bumex to IV and then increased it from Bumex 1 mg twice daily to Bumex 1.5 mg twice daily.  Has been diuresing well.  Developed dermatitis on the lower extremities.  Started hydrocortisone cream.  12/27, VBG revealed hypercapnia she was put back on the mechanical ventilator with improvement.  12/29. Continues to diurese well. Plan to monitor kidney  function with creatinine.  She remains on vent weaning phase 2.  Making some progress.  12/30-1/7:  increased bumex to 2 mg IV bid- changed on 12/30, some erythema, will monitor - may not  need to treat for cellulitis.  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  Increased urinary output, decreased swelling of LE.  On 1/1,  spoke with patient and she would like to be DNR.  She will go through with the swallow test tomorrow but has decided she no longer will have NJ tube in and will eat no matter what the test results are.  She agrees to continue with NJ tube tube until tomorrow.  She reports that her legs feel better.  On 1/2:  NJ discontinued, passed swallow test, diet changed to bite sized food and thin liquids  1/3:  walking around nursing station with PT and walker and 2L of O2 with trach capped, doing well, in much better spirits today.  Reports stool is getting more thick and wants to be off scheduled imodium, it was discontinued, she still has prn ordered.  Improved LE swelling and increased creatinine- decreased bumex to 1.5 IV bid.  1/6: still with LE edema despite aggressive IV diuresis.  Encouraged ambulation and leg elevation.  Weight plateauing - added metolazone for 2 doses and changed dosing times of bumex.  Decreased hydroxyzine dosing to minimize anticholinergic effect in elderly - especially with diuresis and wanting to avoid urinary retention.   1/8-1/9:  k is 2.7, will give extra 60 meq KCL and repeat K at 2pm-  K is 3.5, will follow up in morning.  2pm on 1/8 we had a care conference, all questions were answered.  She has cardio appt set for 2/2.      1/10: KCl 40mEq x1 doses today. Bumex IV -> PO 2mg daily. Likely decannulation tomorrow per pulm.   1/11: Decannulated today. K 3.5 today, no further repletion. Acetazolamide 250mg once.   1/12: Stable. K 2.7, additional 40mEq given, increase to 40mEq  BID tomorrow, repeat level in AM.   1/13: Concern ovn PICC out of place, CXR shows in distal SVC vs cavoatrial junction. K 3.09 today, continue with 40mEq BID repletion today, recheck tomorrow and adjust accordingly. Pt with increasing dyspnea as the day progressed, still with swelling in legs. Bumex 1mg IV given, increase tomorrow to 1.5mg BID. Albuterol neb given. CXR ordered. BiPAP intermittently when she gets tired.     Follow up  -cardio outpt appt 2/2     Assessment & Plan   Acute respiratory failure and arrest on 12/6/23   Acute hypercapnic and hypoxic respiratory failure requiring BiPAP  s/p tracheostomy 12/13/23  s/p Mechanical ventilation  Aspiration pneumonitis vs pneumonia  Possible aspiration event 12/1  Near complete plugging of left lower lobe on CT   Acute mildly displaced and nondisplaced right 3-5 rib fractures with associated right chest wall hematoma and nondisplaced anterolateral left fourth and fifth rib fractures  - weaning per pulmonology  -RT for tracheostomy cares, oxygenation, nebs, pulmonary therapies  - Tracheostomy sutures removed (12/18/2023), decannulated (1/11)  - Mucomyst, duoneb w/ RT   - s/p acetazolamide 250mg IV once (1/11)  - albuterol nebs q6 prn (1/13)  - BiPAP as needed     S/p respiratory arrest 12/6  Hx of severe TR s/p TVR  HFpEF  Hx of Afib s/p AVN ablation - PPM, upgraded to leadless 7/2023  Hypertension  Hyperlipidemia  Hx of aortic dissection repair 2019  Respiratory arrest 12/6   - Stable paced rhythm with underlying atrial flutter. On review of cardiology, underlying aflutter/afib has been present since at least June of this year. Device RN adjusted PPM to PTA settings rate 60 at [facility].   - Pre-op 9/8/23 echo: LVEF 55/60%, mildly dilated RV, normal function  - Echo 12/4 with LVEF 60-65%, normal RV function, TV mean gradient 5 mmHg, no TR  - Echo 12/7 with no significant change from previous with the exception of mild reduction of RV. RV free wall dyskinesia.   - ASA  81 mg daily  -was on scheduled midodrine 10 mg Q8H but stopped that 12/16/23 since BP in the 140s systolic and stable  -Continue midodrine 5 mg TID PRN for SBP<100   - Started Warfarin 12/15.    -Discontinued lovenox 12/26/2023. Continue with coumadin INR on goal. Pharmacy consulted for Coumadin dosing (INR goal 2-3)      Acute on chronic diastolic heart failure with Elevated BNP, improving   - Held PTA spironolactone, torsemide, metoprolol due to hypotension at prior hospital  - Echo 12/4 with LVEF 60-65%, normal RV function, TV mean gradient 5 mmHg, no TR  - Echo 12/7 with no significant change from previous with the exception of mild reduction of RV. RV free wall dyskinesia.   - FLUID STATUS: Pre-op weight 70.9 kg, discharge weight 74.1 kg (163 lbs) on 12/15/2023 and 165 lbs on 12/16  -increased torsemide to 40 mg po daily on 12/19 then switched to bumex 12/22 and increased to bumex 1 mg BID on 12/23- increased to 1.5, now increased bumex to 2 mg IV bid on 12/30- decreased to 1.5 mg IV bid on 1/4 but now weight was plateauing so moved dosing times of bumex and gave one dose metolazone 1/7 AM 1 hour prior to bumex and repeat 1/8.  - increase to bumex 1.5mg PO daily (1/13)  -proBNP trend appears improved from [place] of 66045 to now 3996 1/5  -has a new blister on her right foot likely due to fluid retention  -edema improving with leg elevation above heart and encouraged ambulation    blister on top of Right foot  - likely due to fluid retention  -blister popped on 12/30  -monitor for cellulitis    Multiple loose stools   Hx of C. Difficile (9/2023)  Severe malnutrition in the context of acute illness  - s/p tube feeds   -passed swallow on 1/2, diet changed from tube feeds to bite sized and thin liquids.    - PPI famotidine  - Bowel regimen discontinued due to continued loose stools  - Finished vancomycin po 7 day course   - PRN Zofran  4 mg PO or IV Q6H  - PRN Compazine 5 mg IV Q6H   - loperamide 2 mg BID and prn      Hypernatremia   Hypokalemia  CKD stage 3  Lactic acidosis, resolved  IMAN on CKD, resolved   Edema  Baseline creat ~1.2-1.3   FLUID STATUS: Pre-op weight 70.9 kg, discharge weight 74.1 kg (163 lbs).   - Elevated sodium- it is now normal  -On free water flushes 30ml q4h  - I/O has been inaccurate due to unmeasured stools and voids.  - Replete lytes per protocol  - increase to KCl 40mEq BID (1/13) with prn replacement  - Avoid/limit nephrotoxins as able  -daily weights    Stress induced hyperglycemia  T2DM  Hgb A1c 5.7%, stable off meds  - PTA Jardiance on hold, resume once medically optimized   - Goal BG <180 for optimal healing     Stress induced leukocytosis  Probable aspiration pneumonitis vs pneumonia, resolved  - 11/30 Zosyn 7-day course for pneumonia (finished)   - 12/6 Given further decompensation, Vanco and meropenem was started on 12/6   - Finished meropenem 7 day course (12/12)   - Finished Vancomycin PO 7 day course (for c-diff) (12/13)  - Blood culture with NGTD   - Urine with Candida and sputum with Yeast, likely both colonizations.   - Urine culture with NGTD     Hx of recent C. Difficile  - C. Difficile toxin PCR positive 12/3 with negative antigen and negative toxin - per antimicrobial stewardship team, more likely to be colonization with recent history and no apparent treatment  - Finished oral vancomycin course on 12/13, still with diarrhea but no abd pain, leucocytosis, or fevers so monitoring for now  - Monitor fever curve, WBC, and inflammatory markers as appropriate  - Enteric precautions per infection prevention protocol      3rd, 4th right rib fractures due to CPR  -Continue mini thoracotomy precautions  -Judicious use of narcotics, de-escalate as able     Acute blood loss anemia  - Hgb has been stable.   -Hgb 7.6 on 12/16 and 8.0 on 12/17/2023  -Iron studies: Serum iron 29, iron binding capacity 216, saturation index 13%.  -Follow CBC with weekly labs.   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   -f/up outpt     Diet: Snacks/Supplements Adult: Ensure Clear; With Meals  Regular Diet Adult    DVT Prophylaxis: warfarin  Barrientos Catheter: Not present  Lines: PRESENT      PICC 12/07/23 Triple Lumen Right Basilic ok to use PICC-Site Assessment: WDL      Cardiac Monitoring: None  Code Status: No CPR- Do NOT Intubate      Clinically Significant Risk Factors        # Hypokalemia: Lowest K = 2.7 mmol/L in last 2 days, will replace as needed       # Hypoalbuminemia: Lowest albumin = 2.7 g/dL at 1/4/2024  5:50 AM, will monitor as appropriate     # Hypertension: Noted on problem list    # Acute heart failure with preserved ejection fraction: heart failure noted on problem list, last echo with EF >50%, and receiving IV diuretics       # Overweight: Estimated body mass index is 25.83 kg/m  as calculated from the following:    Height as of this encounter: 1.613 m (5' 3.5\").    Weight as of this encounter: 67.2 kg (148 lb 2.4 oz).   # Severe Malnutrition: based on nutrition assessment      # Financial/Environmental Concerns: none   # Pacemaker present  # History of CABG: noted on surgical history       Disposition Plan     Expected Discharge Date: 01/16/2024      Destination: home with family            Greg Jimenes MD  Hospitalist Service  LTACH  Securely message with [employer] (more info)  Text page via [employer] Paging/Directory   ______________________________________________________________________    Interval History   - no acute events ovn  - doing well today  - potassium improved from y/d, will need ongoing monitoring   - no other acute concerns    Physical " Exam   Vital Signs: Temp: 97.5  F (36.4  C) Temp src: Oral BP: 134/70 (per noc RN) Pulse: 62   Resp: 20 SpO2: 98 % O2 Device: Nasal cannula with humidification Oxygen Delivery: 1 LPM  Weight: 148 lbs 2.39 oz    GENERAL: no respiratory distress  HEENT: Head is normocephalic atraumatic eyes pupils appear equal round and reactive to light  NECK: Supple, Tracheostomy in place  LUNGS: clear to auscultation, decreased breath sounds at bases   HEART: S1 S2, Rate and rhythm is regular. No murmurs, 2+ BLE edema  ABDOMEN: Soft, nontender, no distension. Bowel sounds are positive. No guarding or rebound.  :  stool coming out of vagina    EXTREMITIES: 2+ bilateral lower extremity edema noted   SKIN: popped blister R foot, erythema improved of b/l lower extremities   PSYCHIATRIC: Normal affect and cognition     Medical Decision Making       40 MINUTES SPENT BY ME on the date of service doing chart review, history, exam, documentation & further activities per the note.      Data   Lab Results   Component Value Date    WBC 6.1 12/28/2023    WBC 5.3 06/14/2021     Lab Results   Component Value Date    RBC 2.64 12/28/2023    RBC 4.33 06/14/2021     Lab Results   Component Value Date    HGB 7.8 12/28/2023    HGB 12.8 06/14/2021     Lab Results   Component Value Date    HCT 25.9 12/28/2023    HCT 40.1 06/14/2021     Lab Results   Component Value Date    MCV 98 12/28/2023    MCV 93 06/14/2021     Lab Results   Component Value Date    MCH 29.5 12/28/2023    MCH 29.6 06/14/2021     Lab Results   Component Value Date    MCHC 30.1 12/28/2023    MCHC 31.9 06/14/2021     Lab Results   Component Value Date    RDW 17.9 12/28/2023    RDW 13.5 06/14/2021     Lab Results   Component Value Date     12/28/2023     06/14/2021       Last Comprehensive Metabolic Panel:  Sodium   Date Value Ref Range Status   01/12/2024 135 135 - 145 mmol/L Final     Comment:     Reference intervals for this test were updated on 09/26/2023 to more  accurately reflect our healthy population. There may be differences in the flagging of prior results with similar values performed with this method. Interpretation of those prior results can be made in the context of the updated reference intervals.    06/23/2021 138 133 - 144 mmol/L Final     Potassium   Date Value Ref Range Status   01/12/2024 2.7 (L) 3.4 - 5.3 mmol/L Final   12/13/2022 3.8 3.4 - 5.3 mmol/L Final   06/23/2021 4.5 3.4 - 5.3 mmol/L Final     Potassium POCT   Date Value Ref Range Status   01/11/2024 3.7 3.5 - 5.0 mmol/L Final     Chloride   Date Value Ref Range Status   01/12/2024 92 (L) 98 - 107 mmol/L Final   12/13/2022 99 94 - 109 mmol/L Final   06/23/2021 104 94 - 109 mmol/L Final     Carbon Dioxide   Date Value Ref Range Status   06/23/2021 31 20 - 32 mmol/L Final     Carbon Dioxide (CO2)   Date Value Ref Range Status   01/12/2024 35 (H) 22 - 29 mmol/L Final   12/13/2022 33 (H) 20 - 32 mmol/L Final     Anion Gap   Date Value Ref Range Status   01/12/2024 8 7 - 15 mmol/L Final   12/13/2022 7 3 - 14 mmol/L Final   06/23/2021 3 3 - 14 mmol/L Final     Glucose   Date Value Ref Range Status   01/12/2024 103 (H) 70 - 99 mg/dL Final   12/13/2022 84 70 - 99 mg/dL Final   06/23/2021 86 70 - 99 mg/dL Final     GLUCOSE BY METER POCT   Date Value Ref Range Status   01/09/2024 117 (H) 70 - 99 mg/dL Final     Glucose Whole Blood POCT   Date Value Ref Range Status   01/11/2024 96 70 - 125 mg/dL Final     Urea Nitrogen   Date Value Ref Range Status   01/12/2024 17.3 8.0 - 23.0 mg/dL Final   12/13/2022 31 (H) 7 - 30 mg/dL Final   06/23/2021 30 7 - 30 mg/dL Final     Creatinine   Date Value Ref Range Status   01/12/2024 1.05 (H) 0.51 - 0.95 mg/dL Final   06/23/2021 1.29 (H) 0.52 - 1.04 mg/dL Final     GFR Estimate   Date Value Ref Range Status   01/12/2024 53 (L) >60 mL/min/1.73m2 Final   06/23/2021 39 (L) >60 mL/min/[1.73_m2] Final     Comment:     Non  GFR Calc  Starting 12/18/2018, serum  creatinine based estimated GFR (eGFR) will be   calculated using the Chronic Kidney Disease Epidemiology Collaboration   (CKD-EPI) equation.       Calcium   Date Value Ref Range Status   01/12/2024 8.8 8.8 - 10.2 mg/dL Final   06/23/2021 9.2 8.5 - 10.1 mg/dL Final     Bilirubin Total   Date Value Ref Range Status   01/11/2024 0.6 <=1.2 mg/dL Final   04/04/2019 1.0 0.2 - 1.3 mg/dL Final     Alkaline Phosphatase   Date Value Ref Range Status   01/11/2024 111 40 - 150 U/L Final     Comment:     Reference intervals for this test were updated on 11/14/2023 to more accurately reflect our healthy population. There may be differences in the flagging of prior results with similar values performed with this method. Interpretation of those prior results can be made in the context of the updated reference intervals.   04/04/2019 104 40 - 150 U/L Final     ALT   Date Value Ref Range Status   01/11/2024 10 0 - 50 U/L Final     Comment:     Reference intervals for this test were updated on 6/12/2023 to more accurately reflect our healthy population. There may be differences in the flagging of prior results with similar values performed with this method. Interpretation of those prior results can be made in the context of the updated reference intervals.     06/14/2021 22 0 - 50 U/L Final     AST   Date Value Ref Range Status   01/11/2024 23 0 - 45 U/L Final     Comment:     Reference intervals for this test were updated on 6/12/2023 to more accurately reflect our healthy population. There may be differences in the flagging of prior results with similar values performed with this method. Interpretation of those prior results can be made in the context of the updated reference intervals.   04/04/2019 27 0 - 45 U/L Final

## 2024-01-13 NOTE — PLAN OF CARE
Problem: Adult Inpatient Plan of Care  Goal: Optimal Comfort and Wellbeing  Outcome: Progressing     Problem: Malnutrition  Goal: Improved Nutritional Intake  Outcome: Progressing     Patient is alert and oriented x 4. Patient denies any pain or discomfort. Appetite is good with adequate fluids intake. Patient spent most of the shift watching TV with her . Will continue to monitor.

## 2024-01-14 NOTE — PROGRESS NOTES
At 07:30 am, with RRT in progress, patient was met in bed drowsy with shortness of breath, using her accessory muscles to breath while a Bipap was in place. V/s were /81 RR 24 T 97.6F blood glucose 214. At 07:39 am, IV Hydralazine 10 mg give and lab drawn per order. At 07:46 am, BP recheck was 165/70 P 68 R 24. At 08:00 am, BP recheck was 117/47 P95. Patient, however, opened her eyes, responded to the attending physician assessment questions. Patient became restlessness, attempted to get out of bed and attempted to remove the Bipap over her nose.Patient was redirected multiple times by the one-on-one staff monitoring her. At reporting time, patient spouse at her bedside and EKG test ongoing.  Will continue to monitor.

## 2024-01-14 NOTE — PROGRESS NOTES
[facility]    Medicine Progress Note - Hospitalist Service    Date of Admission:  12/15/2023    Brief summary:  Priya Funez is a 83 year old female with a PMHx of HFpEF, severe TR, permanent AF s/p AVN ablation with PPM implant 1/11/19 s/p extraction TV (transvenous) PPM and implant leadless PPM 7/31/23, emergent aortic dissection repair 1/4/19, HTN, CKD, NEDA, and obesity who presented to [facility] as an outpatient for elective TVR done on 11/28/2023.  Immediate postoperative course was complicated by acute hypoxic and hypercapnic respiratory failure requiring BiPAP and probable pneumonia and treated with 7 days of IV Zosyn, patient was transferred to surgical telemetry floor on 12/2/2020.  On 12/6/2023 she was transferred back to CVICU after RRT for acute hypoxic respiratory distress and subsequent respiratory arrest with short CPR, re-intubation, and ROSC achieved. She sustained mildly displaced and nondisplaced right 3-5 rib fractures with associated right chest wall hematoma and nondisplaced anterolateral left 4th and 5th rib fractures. She was treated with meropenem 7 day course. She also has hx of recent C diff and multiple loose stools, for which she completed a 7 day po vancomycin course (per antimicrobial stewardship team, more likely to be colonization with recent history and no apparent treatment). Patient was transiently hyperglycemic and treated with insulin infusion then transitioned to sliding scale insulin per protocol. Blood sugars remained stable, has not required insulin coverage.  She remained intubated in the CVICU until undergoing tracheostomy by Dr. Pitts on 12/13/23, doing well with trach dome and PS currently.  She was treated for volume overload with diuretics (Bumex and chlorothiazide) with subsequent hypernatremia and hypotension requiring FWF and pressor support.  Spironolactone, torsemide and metoprolol discontinued and midodrine started with now stable blood pressures. Pre-op weight 70.9 kg,  discharge weight 74.1 kg (163 lbs) on 12/15/2023.  Patient was discharged to LTACH 12/15/2023 for continuing vent weaning, therapies      LTACH course:  12/17: Vitals stable.  Scheduled midodrine discontinued with BP stable in the 140s.  Diuretic therapy with torsemide restarted given peripheral edema and volume status, however at lower dose of 10 mg daily (home dose 40 mg daily) titrate slowly based on BP response.  Resume metoprolol for A-fib rate control as well gradually.  Labs reviewed, stable electrolytes, creatinine 0.82. ProBNP 3710, down from 16299 and 11017.  Hgb 8.0 from 7.6.  INR 0.98 from 1.12.  Continue heparin and Coumadin together for now until INR therapeutic.  Started hydroxyzine for anxiety 12/17.  A.m. labs ordered.     12/18-12/24:  increased torsemide to 20 mg po daily due to peripheral edema on 12/18, having loose stools,  changed tube feeds to higher fiber formula and add imodium daily and prn.  Still with edema,  increase toresemide to 40 mg po daily on 12/19 and increased imodium to bid and prn, changed heparin to lovenox for pt comfort.  She is getting prn hydroxyzine for anxiety which we should try to titrate off as anticoholinergics are not optimal in the geriatric population. 12/22 switched to bumex 1 mg daily and increased to bumex 1 mg BID 12/23.  Encouraged leg elevation above heart. Give hydrochlorothiazide one dose 12/24 - if that doesn't help then would need to try IV diuretics.  INR above 2 for the first time on 12/24 so if above 2 on 12/25 then could stop the lovenox.  12/25 - 12/29.  Had extensive lower extremity edema.  Switched Bumex to IV and then increased it from Bumex 1 mg twice daily to Bumex 1.5 mg twice daily.  Has been diuresing well.  Developed dermatitis on the lower extremities.  Started hydrocortisone cream.  12/27, VBG revealed hypercapnia she was put back on the mechanical ventilator with improvement.  12/29. Continues to diurese well. Plan to monitor kidney  function with creatinine.  She remains on vent weaning phase 2.  Making some progress.  12/30-1/7:  increased bumex to 2 mg IV bid- changed on 12/30, some erythema, will monitor - may not  need to treat for cellulitis.  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  Increased urinary output, decreased swelling of LE.  On 1/1,  spoke with patient and she would like to be DNR.  She will go through with the swallow test tomorrow but has decided she no longer will have NJ tube in and will eat no matter what the test results are.  She agrees to continue with NJ tube tube until tomorrow.  She reports that her legs feel better.  On 1/2:  NJ discontinued, passed swallow test, diet changed to bite sized food and thin liquids  1/3:  walking around nursing station with PT and walker and 2L of O2 with trach capped, doing well, in much better spirits today.  Reports stool is getting more thick and wants to be off scheduled imodium, it was discontinued, she still has prn ordered.  Improved LE swelling and increased creatinine- decreased bumex to 1.5 IV bid.  1/6: still with LE edema despite aggressive IV diuresis.  Encouraged ambulation and leg elevation.  Weight plateauing - added metolazone for 2 doses and changed dosing times of bumex.  Decreased hydroxyzine dosing to minimize anticholinergic effect in elderly - especially with diuresis and wanting to avoid urinary retention.   1/8-1/9:  k is 2.7, will give extra 60 meq KCL and repeat K at 2pm-  K is 3.5, will follow up in morning.  2pm on 1/8 we had a care conference, all questions were answered.  She has cardio appt set for 2/2.      1/10: KCl 40mEq x1 doses today. Bumex IV -> PO 2mg daily. Likely decannulation tomorrow per pulm.   1/11: Decannulated today. K 3.5 today, no further repletion. Acetazolamide 250mg once.   1/12: Stable. K 2.7, additional 40mEq given, increase to 40mEq  BID tomorrow, repeat level in AM.   1/13: Concern ovn PICC out of place, CXR shows in distal SVC vs cavoatrial junction. K 3.09 today, continue with 40mEq BID repletion today, recheck tomorrow and adjust accordingly. Pt with increasing dyspnea as the day progressed, still with swelling in legs. Bumex 1mg IV given, increase tomorrow to 1.5mg BID. Albuterol neb given. CXR ordered. BiPAP intermittently when she gets tired.   1/14: persistent SOB ovn, given additional bumex 1mg IV. Lorazepam 0.5mg given. Remains on intermittent BiPAP. Significant anxiety. RRT called, please refer to Significant Event note for details. Following RRT repeat VBG drawn showing worsening hypercapnia with CO2 of 83 and pH 7.19. Pt noted to still be triggering BiPAP but abdominal breathing and drawing small tidal volumes. Spoke with /daughter at bedside regarding situation and that only two options are transfer/intubation or comfort care. As the pt is DNR/DNI, they chose to respect these wishes and pursue comfort care. We will maintain BiPAP for now until additional family can come in to see her as I suspect she will pass quickly once it is removed. I did inform them that I expect her to only have a few hours remaining based on current CO2 trend. Family tearful but accepting. Nursing and staff notified. Awaiting final decision for BiPAP withdrawal.     Follow up  -cardio outpt appt 2/2     Assessment & Plan   GOC  - based on finding of the morning, plan for comfort care  - maintain BiPAP until additional family can make it in to see pt  - will withdraw BiPAP at that time  - comfort care meds ordered    Dyspnea  - worsened 1/13  - increased bumex to 1.5mg BID  - BiPAP intermittently ovn  - lorazepam 0.5mg q4h prn anxiety  - BMP, Mg, BNP for today  - CXR y/d stable with previous, possibly some increased haziness and perihilar congestion but this is only slight  - satting >95%  - may consider ABG   - would also consider trial of morphine  2.5mg for dyspnea     Acute respiratory failure and arrest on 12/6/23   Acute hypercapnic and hypoxic respiratory failure requiring BiPAP  s/p tracheostomy 12/13/23  s/p Mechanical ventilation  Aspiration pneumonitis vs pneumonia  Possible aspiration event 12/1  Near complete plugging of left lower lobe on CT   Acute mildly displaced and nondisplaced right 3-5 rib fractures with associated right chest wall hematoma and nondisplaced anterolateral left fourth and fifth rib fractures  - weaning per pulmonology  -RT for tracheostomy cares, oxygenation, nebs, pulmonary therapies  - Tracheostomy sutures removed (12/18/2023), decannulated (1/11)  - Mucomyst, duoneb w/ RT   - s/p acetazolamide 250mg IV once (1/11)  - albuterol nebs q6 prn (1/13)  - BiPAP as needed     S/p respiratory arrest 12/6  Hx of severe TR s/p TVR  HFpEF  Hx of Afib s/p AVN ablation - PPM, upgraded to leadless 7/2023  Hypertension  Hyperlipidemia  Hx of aortic dissection repair 2019  Respiratory arrest 12/6   - Stable paced rhythm with underlying atrial flutter. On review of cardiology, underlying aflutter/afib has been present since at least June of this year. Device RN adjusted PPM to PTA settings rate 60 at [facility].   - Pre-op 9/8/23 echo: LVEF 55/60%, mildly dilated RV, normal function  - Echo 12/4 with LVEF 60-65%, normal RV function, TV mean gradient 5 mmHg, no TR  - Echo 12/7 with no significant change from previous with the exception of mild reduction of RV. RV free wall dyskinesia.   - ASA 81 mg daily  -was on scheduled midodrine 10 mg Q8H but stopped that 12/16/23 since BP in the 140s systolic and stable  -Continue midodrine 5 mg TID PRN for SBP<100   - Started Warfarin 12/15.    -Discontinued lovenox 12/26/2023. Continue with coumadin INR on goal. Pharmacy consulted for Coumadin dosing (INR goal 2-3)      Acute on chronic diastolic heart failure with Elevated BNP, improving   - Held PTA spironolactone, torsemide, metoprolol due to hypotension  at prior hospital  - Echo 12/4 with LVEF 60-65%, normal RV function, TV mean gradient 5 mmHg, no TR  - Echo 12/7 with no significant change from previous with the exception of mild reduction of RV. RV free wall dyskinesia.   - FLUID STATUS: Pre-op weight 70.9 kg, discharge weight 74.1 kg (163 lbs) on 12/15/2023 and 165 lbs on 12/16  -increased torsemide to 40 mg po daily on 12/19 then switched to bumex 12/22 and increased to bumex 1 mg BID on 12/23- increased to 1.5, now increased bumex to 2 mg IV bid on 12/30- decreased to 1.5 mg IV bid on 1/4 but now weight was plateauing so moved dosing times of bumex and gave one dose metolazone 1/7 AM 1 hour prior to bumex and repeat 1/8.  - increase to bumex 1.5mg PO daily (1/13)  -proBNP trend appears improved from a peak of 09123 to now 3996 1/5  -has a new blister on her right foot likely due to fluid retention  -edema improving with leg elevation above heart and encouraged ambulation    blister on top of Right foot  - likely due to fluid retention  -blister popped on 12/30  -monitor for cellulitis    Multiple loose stools   Hx of C. Difficile (9/2023)  Severe malnutrition in the context of acute illness  - s/p tube feeds   -passed swallow on 1/2, diet changed from tube feeds to bite sized and thin liquids.    - PPI famotidine  - Bowel regimen discontinued due to continued loose stools  - Finished vancomycin po 7 day course   - PRN Zofran  4 mg PO or IV Q6H  - PRN Compazine 5 mg IV Q6H   - loperamide 2 mg BID and prn     Hypernatremia   Hypokalemia  CKD stage 3  Lactic acidosis, resolved  IMAN on CKD, resolved   Edema  Baseline creat ~1.2-1.3   FLUID STATUS: Pre-op weight 70.9 kg, discharge weight 74.1 kg (163 lbs).   - Elevated sodium- it is now normal  -On free water flushes 30ml q4h  - I/O has been inaccurate due to unmeasured stools and voids.  - Replete lytes per protocol  - increase to KCl 40mEq BID (1/13) with prn replacement  - Avoid/limit nephrotoxins as  able  -daily weights    Stress induced hyperglycemia  T2DM  Hgb A1c 5.7%, stable off meds  - PTA Jardiance on hold, resume once medically optimized   - Goal BG <180 for optimal healing     Stress induced leukocytosis  Probable aspiration pneumonitis vs pneumonia, resolved  - 11/30 Zosyn 7-day course for pneumonia (finished)   - 12/6 Given further decompensation, Vanco and meropenem was started on 12/6   - Finished meropenem 7 day course (12/12)   - Finished Vancomycin PO 7 day course (for c-diff) (12/13)  - Blood culture with NGTD   - Urine with Candida and sputum with Yeast, likely both colonizations.   - Urine culture with NGTD     Hx of recent C. Difficile  - C. Difficile toxin PCR positive 12/3 with negative antigen and negative toxin - per antimicrobial stewardship team, more likely to be colonization with recent history and no apparent treatment  - Finished oral vancomycin course on 12/13, still with diarrhea but no abd pain, leucocytosis, or fevers so monitoring for now  - Monitor fever curve, WBC, and inflammatory markers as appropriate  - Enteric precautions per infection prevention protocol      3rd, 4th right rib fractures due to CPR  -Continue mini thoracotomy precautions  -Judicious use of narcotics, de-escalate as able     Acute blood loss anemia  - Hgb has been stable.   -Hgb 7.6 on 12/16 and 8.0 on 12/17/2023  -Iron studies: Serum iron 29, iron binding capacity 216, saturation index 13%.  -Follow CBC with weekly labs.   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   -f/up outpt     Diet: Snacks/Supplements Adult: Ensure Clear; With Meals  Regular  "Diet Adult    DVT Prophylaxis: warfarin  Barrientos Catheter: Not present  Lines: PRESENT      PICC 12/07/23 Triple Lumen Right Basilic ok to use PICC-Site Assessment: WDL      Cardiac Monitoring: None  Code Status: No CPR- Do NOT Intubate      Clinically Significant Risk Factors              # Hypoalbuminemia: Lowest albumin = 2.7 g/dL at 1/4/2024  5:50 AM, will monitor as appropriate     # Hypertension: Noted on problem list    # Acute heart failure with preserved ejection fraction: heart failure noted on problem list, last echo with EF >50%, and receiving IV diuretics       # Overweight: Estimated body mass index is 25.83 kg/m  as calculated from the following:    Height as of this encounter: 1.613 m (5' 3.5\").    Weight as of this encounter: 67.2 kg (148 lb 2.4 oz).   # Severe Malnutrition: based on nutrition assessment      # Financial/Environmental Concerns: none   # Pacemaker present  # History of CABG: noted on surgical history       Disposition Plan     Expected Discharge Date: 01/16/2024      Destination: home with family            Greg Jimenes MD  Hospitalist Service  LTACH  Securely message with [employer] (more info)  Text page via [facility] Paging/Directory   ______________________________________________________________________    Interval History   - persistent dyspnea ovn, needing BiPAP  - also significant anxiety  - RRT this morning  - Woke up briefly after BiPAP placed  - at that time she denied chest pain/pressure, headache, vision changes, new sensory loss, numbness and was able to follow my commands to complete a brief cranial nerve exam  -  and daughter at bedside following the RRT and have been updated as new information becomes available   - no other acute concerns    Physical Exam   Vital Signs: Temp: 97.5  F (36.4  C) Temp src: Oral BP: (!) 151/66 Pulse: 64   Resp: (!) 32 SpO2: 92 % O2 Device: BiPAP/CPAP Oxygen Delivery: 3 LPM  Weight: 148 lbs 2.39 oz    GENERAL: no respiratory " distress  HEENT: Head is normocephalic atraumatic eyes pupils appear equal round and reactive to light  NECK: Supple, Tracheostomy in place  LUNGS: clear to auscultation, decreased breath sounds at bases   HEART: S1 S2, Rate and rhythm is regular. No murmurs, 2+ BLE edema  ABDOMEN: Soft, nontender, no distension. Bowel sounds are positive. No guarding or rebound.  :  stool coming out of vagina    EXTREMITIES: 2+ bilateral lower extremity edema noted   SKIN: popped blister R foot, erythema improved of b/l lower extremities   PSYCHIATRIC: Normal affect and cognition     Medical Decision Making       70 MINUTES SPENT BY ME on the date of service doing chart review, history, exam, documentation & further activities per the note.      Data   Lab Results   Component Value Date    WBC 6.1 12/28/2023    WBC 5.3 06/14/2021     Lab Results   Component Value Date    RBC 2.64 12/28/2023    RBC 4.33 06/14/2021     Lab Results   Component Value Date    HGB 7.8 12/28/2023    HGB 12.8 06/14/2021     Lab Results   Component Value Date    HCT 25.9 12/28/2023    HCT 40.1 06/14/2021     Lab Results   Component Value Date    MCV 98 12/28/2023    MCV 93 06/14/2021     Lab Results   Component Value Date    MCH 29.5 12/28/2023    MCH 29.6 06/14/2021     Lab Results   Component Value Date    MCHC 30.1 12/28/2023    MCHC 31.9 06/14/2021     Lab Results   Component Value Date    RDW 17.9 12/28/2023    RDW 13.5 06/14/2021     Lab Results   Component Value Date     12/28/2023     06/14/2021       Last Comprehensive Metabolic Panel:  Sodium   Date Value Ref Range Status   01/12/2024 135 135 - 145 mmol/L Final     Comment:     Reference intervals for this test were updated on 09/26/2023 to more accurately reflect our healthy population. There may be differences in the flagging of prior results with similar values performed with this method. Interpretation of those prior results can be made in the context of the updated reference  intervals.    06/23/2021 138 133 - 144 mmol/L Final     Potassium   Date Value Ref Range Status   01/13/2024 3.9 3.4 - 5.3 mmol/L Final   12/13/2022 3.8 3.4 - 5.3 mmol/L Final   06/23/2021 4.5 3.4 - 5.3 mmol/L Final     Potassium POCT   Date Value Ref Range Status   01/11/2024 3.7 3.5 - 5.0 mmol/L Final     Chloride   Date Value Ref Range Status   01/12/2024 92 (L) 98 - 107 mmol/L Final   12/13/2022 99 94 - 109 mmol/L Final   06/23/2021 104 94 - 109 mmol/L Final     Carbon Dioxide   Date Value Ref Range Status   06/23/2021 31 20 - 32 mmol/L Final     Carbon Dioxide (CO2)   Date Value Ref Range Status   01/12/2024 35 (H) 22 - 29 mmol/L Final   12/13/2022 33 (H) 20 - 32 mmol/L Final     Anion Gap   Date Value Ref Range Status   01/12/2024 8 7 - 15 mmol/L Final   12/13/2022 7 3 - 14 mmol/L Final   06/23/2021 3 3 - 14 mmol/L Final     Glucose   Date Value Ref Range Status   01/12/2024 103 (H) 70 - 99 mg/dL Final   12/13/2022 84 70 - 99 mg/dL Final   06/23/2021 86 70 - 99 mg/dL Final     GLUCOSE BY METER POCT   Date Value Ref Range Status   01/09/2024 117 (H) 70 - 99 mg/dL Final     Glucose Whole Blood POCT   Date Value Ref Range Status   01/11/2024 96 70 - 125 mg/dL Final     Urea Nitrogen   Date Value Ref Range Status   01/12/2024 17.3 8.0 - 23.0 mg/dL Final   12/13/2022 31 (H) 7 - 30 mg/dL Final   06/23/2021 30 7 - 30 mg/dL Final     Creatinine   Date Value Ref Range Status   01/12/2024 1.05 (H) 0.51 - 0.95 mg/dL Final   06/23/2021 1.29 (H) 0.52 - 1.04 mg/dL Final     GFR Estimate   Date Value Ref Range Status   01/12/2024 53 (L) >60 mL/min/1.73m2 Final   06/23/2021 39 (L) >60 mL/min/[1.73_m2] Final     Comment:     Non  GFR Calc  Starting 12/18/2018, serum creatinine based estimated GFR (eGFR) will be   calculated using the Chronic Kidney Disease Epidemiology Collaboration   (CKD-EPI) equation.       Calcium   Date Value Ref Range Status   01/12/2024 8.8 8.8 - 10.2 mg/dL Final   06/23/2021 9.2 8.5 -  10.1 mg/dL Final     Bilirubin Total   Date Value Ref Range Status   01/11/2024 0.6 <=1.2 mg/dL Final   04/04/2019 1.0 0.2 - 1.3 mg/dL Final     Alkaline Phosphatase   Date Value Ref Range Status   01/11/2024 111 40 - 150 U/L Final     Comment:     Reference intervals for this test were updated on 11/14/2023 to more accurately reflect our healthy population. There may be differences in the flagging of prior results with similar values performed with this method. Interpretation of those prior results can be made in the context of the updated reference intervals.   04/04/2019 104 40 - 150 U/L Final     ALT   Date Value Ref Range Status   01/11/2024 10 0 - 50 U/L Final     Comment:     Reference intervals for this test were updated on 6/12/2023 to more accurately reflect our healthy population. There may be differences in the flagging of prior results with similar values performed with this method. Interpretation of those prior results can be made in the context of the updated reference intervals.     06/14/2021 22 0 - 50 U/L Final     AST   Date Value Ref Range Status   01/11/2024 23 0 - 45 U/L Final     Comment:     Reference intervals for this test were updated on 6/12/2023 to more accurately reflect our healthy population. There may be differences in the flagging of prior results with similar values performed with this method. Interpretation of those prior results can be made in the context of the updated reference intervals.   04/04/2019 27 0 - 45 U/L Final

## 2024-01-14 NOTE — SIGNIFICANT EVENT
At 13:10 pm, the primary physician updated patient family. Comfort Care commenced. Bipap was discontinued, and patient placed on 2 L of oxygen per nasal cannula. For increased dyspnea, PRN Morphine 5 mg po was given for comfort. Patient daughter declined nursing cares, repositioning, and oral cares x multiple attempts. Will continue to monitor.

## 2024-01-14 NOTE — PROGRESS NOTES
RRT F/U- Pt lethargic but able to respond briefly with eye opening to pain full stimuli. V/S b/p 144/63, hr 63/min, rr 26/min temp 97.8 (Ax), o2 sat 94% on bipap 30% fio2. LS exp.wheezing bilateral, requested to RT to give prn neb.Will continue to monitor status.  Qasim Alcazar RN

## 2024-01-14 NOTE — PLAN OF CARE
Problem: Adult Inpatient Plan of Care  Goal: Optimal Comfort and Wellbeing  1/14/2024 0741 by Darcy Bean RN  Outcome: Not Progressing   Goal Outcome Evaluation:    At 6:30 AM, patient got up to use the commode, then she made a phone call to her  to come in to see her as soon as he can. Briefly had oxygen per nasal cannula, in order to make the call. She was assisted with pivot assist back to bed, seemed very exhausted by this activity of getting in and out of bed, was using accessory muscles despite being on bipap. Staff RN called patient daughter, left a message to update her on current situation.Oxygen saturation kept dropping down to 82%. RRT called. Report given to oncoming RN.

## 2024-01-14 NOTE — PLAN OF CARE
Problem: Adult Inpatient Plan of Care  Goal: Optimal Comfort and Wellbeing  Intervention: Provide Person-Centered Care     Goal Outcome Evaluation:         Writer updated Dr as follows-Pt is having elevated RR of 30 but no complaints of pain, increased difficulty walking, and increased SOB. She has had Elevated BP today Other VS in normal limits. Lung sounds- upper lobes crackles not noted on prior assessments. Looked to see if has PRN neb did not find one. Has increased anxiety but feels the SOB came first.  Dr assessed pt, new orders as follows:  order for Bumex Iv, prn neb, chest x-ray, and intermittent BIPAP.        Pt continues to have SOB and difficulty with keeping BIPAP in place. BP has come down to 147/65 HR 61 and o2 is 100% on 1L nasal cannula, RR 22 labored.  Writer updated on call   New mask for BIPAP was tried pt is able to remove independently and able to rest.

## 2024-01-14 NOTE — PROGRESS NOTES
Patient could not tolerated the BIPAP 12/6, 10. At the start of the day shift. RRT code was called. Patient placed on ambu- bag 15 L- for 5 minutes and saturation came back to 95%. Patient placed back on BIPAP 12/6, 10 RR, 30- 100%. VBG was done and the result showed:    Latest Reference Range & Units 01/14/24 10:07   pH Venous POCT 7.35 - 7.45  7.19 (LL)   Base Excess/Deficit (+/-) POCT mmol/L 3.4   pCO2 Venous POCT 35 - 50 mm Hg 83 (H)   pO2 Venous POCT 25 - 47 mm Hg 47   O2 Sat, Venous POCT 70.0 - 75.0 % 74.1   Bicarbonate Venous POCT 24 - 30 mmol/L 26   (LL): Data is critically low  (H): Data is abnormally high  BIPAP discontinued at 1306 PM to 2 L-NC per Doctor's order -- for comfort cares.

## 2024-01-14 NOTE — PLAN OF CARE
Pt c/o SOB today, CXR was done, BiPap started 12/6 24%.    Pt had BiPap on/off all night,on 5-6 occasions. Needed off to use commode or just to have a break. Pt uses NC 2-3L when not on BiPap     Pt agrees to use BiPap but does not feel like it helps well with her SOB.    MD was called and made some meds adjustments     O: BS clear to decr in BLL, R>L, some stridorous breathing at upper a/w. Pt easily becomes SOB, no tachypnea, RR 20-24. Sats % on BiPap 25% (30% for short time)    Cont to monitor closely.

## 2024-01-14 NOTE — SIGNIFICANT EVENT
Significant Event Note - Overnight Coverage MD    Time of event: 12:23 AM January 14, 2024    Description of event:  Paged: Patient subjectively more SOB.   B/P: 195/93, T: 97.5, P: 62, R: 26  She appears anxious  No crackles or rhonchi. Diminished breath sounds at bases, rt > lt    CXR reviewed: elevated right hemidiaphragm ( similar to prior CXR), increased generalized haziness, suspect pulm edema.     Plan:  -Bumex 1 mg IV x1  -BMP, proBNP, Mg in the AM  -Hydroxyzine x 1 now. Agreeable to low dose ativan if hydroxyzine not effective  -Continue with nasal BiPAP , as tolerated . Did not tolerate mask (was anxious earlier in shift, difficulty removing mask, 1:1 sitter ordered if mask BiPAP to be used)      Discussed with: bedside nurse and charge RN, RT    Dilip Rogers MD

## 2024-01-14 NOTE — SIGNIFICANT EVENT
Patient is unresponsive, continues to be on a Bipap, 2 hourly nebulizer administered, and lab tests results closely monitored by the attending physician. Awaiting the attending physician to update spouse and daughter about patient declining status. At reporting time, all close family members at her bedside. Will continue 1:1 monitoring.

## 2024-01-14 NOTE — PROGRESS NOTES
RRT called at 07:25 am, Pt was non- responding with agonal breathing and o2 sat drop to 82% despite being on BiPAP. RT start bagging Pt ,vital sign and BG checked. B/P was 186/81, the rest vital wnl see flowsheet. ,Hydralazine 10mg ivp given. F/U b/p was 158/65. Labs drawn and vbg done. Pt in respiratory acidosis continue with bipap. EKG done.Pt became responding and start to be restless attempts to remove bipap, RN 1:1 attendant at the bedside at this time.VSS.Will continue to monitor Pt status closely.  Qasim Alcazar RN

## 2024-01-14 NOTE — PLAN OF CARE
Problem: Adult Inpatient Plan of Care  Goal: Optimal Comfort and Wellbeing  Outcome: Progressing  Intervention: Provide Person-Centered Care  Recent Flowsheet Documentation  Taken 1/14/2024 0000 by Darcy Bean RN  Trust Relationship/Rapport: care explained     Problem: Anxiety Signs/Symptoms  Goal: Improved Sleep (Anxiety Signs/Symptoms)  Outcome: Progressing   Goal Outcome Evaluation:       Patient having shortness of breath with exertion, anxious, said she is afraid to be left alone in the room, staff RN sitting near room for ongoing reassurance of cares. Using bipap on, and off. Briefly using the nasal cannula when she was up to commode. Still experiencing difficulties with breathing. Given atarax 10 mg, bumex 1 mg IV, slept for 2 straight hours following the bumex. However when she woke up at 6 AM to use bedside commode with 1 assist, she was once again having difficulties with breathing, sat upright in chair with no significant effect, put bipap back on, given 0.5 mg of ativan IV. Will continue monitoring.

## 2024-01-14 NOTE — SIGNIFICANT EVENT
Significant Event Note    Time of event: 0730    Description of event:  RRT called this morning.  Per RN pt was up in her chair around 0620 and received a dose of lorazepam 0.5mg IV.  She called her  and then got back into the bed.   Later (unclear exact time) the RTs found her unresponsive in bed. RRT was called.    Pt was non-responsive and appeared to display agonal breathing.  BiPAP was transitioned to regular mask and placed on pt.  She was drawing good Vt around 500-600 and RR of 10.  Pressures were elevated to 180s systolic.  HR in 80s, stable, with strong peripheral pulses.  Lungs with diminished breath sounds at the bases, no crackles noted, no wheezing.  Cardiac exam demonstrated RRR, no m/r/g, good radial/pedal pulses.     VBG was obtained showing 7.29/63/45/27.  EKG ordered - on my read aflutter, ventricular rate 66, pacing spikes noted, no other acute findings.   CXR reviewed from last night, stable with previous, possibly some increased haziness and perihilar congestion but minimal at best.  Noted to have received Bumex 1mg IV x2 additional doses y/d, plan for increased dose today already ordered.  CBC, BMP, Mg drawn and pending.    After being on the BiPAP for approx 20-30 minutes the pt woke up. She was groggy and confused but able to answer questions.  She denied any chest pain/pressure, nausea, headache, vision changes, numbness/tingling, pins-and-needles sensations, or other new sx she could describe.   Explained that her CO2 was found to be elevated and she needed to be on the BiPAP for now to blow that off. Pt acknowledged understanding although remains lethargic.     Raphael was updated on the unit this morning. Discussed w/u at this time and suspicion this was caused by elevated pCO2 possibly 2/2 lorazepam dose. Discussed maintaining BiPAP for now with repeat VBG later this morning.  Labs still pending this morning.      Discussed with: Pt, , bedside/charge/SWAT nurse,  RTs    Greg Jimenes MD      ADDENDUM: [id number]    Repeat VBG @ 1007 showing 7.19/83/47/26  CO2 worsening despite adequate oxygenation.  Per RT, still triggering BiPAP but with no significant respiratory drive.  Drawling low Vt.    Spoke with  and daughter at bedside regarding these new findings.  Discussed that the BiPAP is not helping her blow off the CO2 since she has minimal drive to breath on her own.  Discussed only option to address this would be intubation which would necessitate ICU transfer.  Alternative option would be to switch to comfort care approach.    Given the pt's choice to become DNR/DNI her family chose to respect this wish and has elected for comfort care.  They plan to have a few additional family members come in shortly to see the pt.  Until that time we will maintain the BiPAP.  Family asked how much time she has, discussed likely a few hours, shorter when the BiPAP removed.    Bedside/Charge/SWAT/ANS updated.    Greg Jimenes MD    ADDENDUM: [id number]    Family is ready to transition to full comfort and remove the BiPAP.  Comfort Care orders placed.  Unnecessary meds, labs, etc removed.  RT notified to remove BiPAP.  Charge/bedside nurse updated.    Greg Jimenes MD

## 2024-01-15 ENCOUNTER — TELEPHONE (OUTPATIENT)
Dept: ANTICOAGULATION | Facility: CLINIC | Age: 84
End: 2024-01-15
Payer: COMMERCIAL

## 2024-01-15 DIAGNOSIS — I48.20 CHRONIC ATRIAL FIBRILLATION (H): Primary | ICD-10-CM

## 2024-01-15 DIAGNOSIS — Z79.01 CURRENT USE OF LONG TERM ANTICOAGULATION: ICD-10-CM

## 2024-01-15 ASSESSMENT — ACTIVITIES OF DAILY LIVING (ADL): ADLS_ACUITY_SCORE: 53

## 2024-01-15 NOTE — PLAN OF CARE
Doctor pronounced Pt time of death .  Cares of the body compiled pt dressed in pink top and blue pants.  Son Bobby was present but not at bedside at time of death.  Writer called pt  and then pt daughter, Susie to notify of death.    [employer] notified at -no donation   At the time of writing note family has not informed of  home they are wanting but do have one picked out.  Currently are spending time with pt in room and will inform staff when they are ready for pt to be moved.

## 2024-01-15 NOTE — PLAN OF CARE
Speech Language Therapy Discharge Summary    Reason for therapy discharge:    All goals and outcomes met, no further needs identified.    Progress towards therapy goal(s). See goals on Care Plan in [facility] electronic health record for goal details.  Goals met    Therapy recommendation(s):    No further therapy is recommended. Patient discharged from speech therapy with recommendations for diet of regular textures and thin liquids. Patient subsequently remained in this facility, had change in medical status.

## 2024-01-15 NOTE — PLAN OF CARE
Occupational Therapy Discharge Summary    Reason for therapy discharge:    Change in medical status.    Progress towards therapy goal(s). See goals on Care Plan in [facility] electronic health record for goal details.  Goals partially met.  Barriers to achieving goals:   Pt placed on comfort cares, therapy discharged.    Therapy recommendation(s):    No further therapy is recommended.    Inge Sun, OTR/L

## 2024-01-15 NOTE — DEATH PRONOUNCEMENT
MD DEATH PRONOUNCEMENT    Called to pronounce Priya Funez dead.    Physical Exam: Unresponsive to noxious stimuli, Spontaneous respirations absent, Breath sounds absent, Carotid pulse absent, and Heart sounds absent    Patient was pronounced dead at 10:15 PM, 2024.    No data filed       Principal Problem:    Acute respiratory failure (H)  Active Problems:    Chronic diastolic heart failure (H)    Benign essential hypertension    Chronic kidney disease, stage III (moderate) (H)    Severe tricuspid regurgitation    Cardiac pacemaker in situ    Current use of long term anticoagulation    S/P tricuspid valve replacement       Infectious disease present?: NO    Communicable disease present? (examples: HIV, chicken pox, TB, Ebola, CJD) :  NO    Multi-drug resistant organism present? (example: MRSA): NO    Please consider an autopsy if any of the following exist:  NO Unexpected or unexplained death during or following any dental, medical, or surgical diagnostic treatment procedures.   NO Death of mother at or up to seven days after delivery.     NO All  and pediatric deaths.     NO Death where the cause is sufficiently obscure to delay completion of the death certificate.   NO Deaths in which autopsy would confirm a suspected illness/condition that would affect surviving family members or recipients of transplanted organs.     The following deaths must be reported to the 's Office:  NO A death that may be due entirely or in part to any factors other than natural disease (recent surgery, recent trauma, suspected abuse/neglect).   NO A death that may be an accident, suicide, or homicide.     NO Any sudden, unexpected death in which there is no prior history of significant heart disease or any other condition associated with sudden death.   NO A death under suspicious, unusual, or unexpected circumstances.    NO Any death which is apparently due to natural causes but in which the  does  not have a personal physician familiar with the patient s medical history, social, or environmental situation or the circumstances of the terminal event.   NO Any death apparently due to Sudden Infant Death Syndrome.     NO Deaths that occur during, in association with, or as consequences of a diagnostic, therapeutic, or anesthetic procedure.   NO Any death in which a fracture of a major bone has occurred within the past (6) six months.   NO A death of persons note seen by their physician within 120 days of demise.     NO Any death in which the  was an inmate of a public institution or was in the custody of Law Enforcement personnel.   NO  All unexpected deaths of children   NO Solid organ donors   NO Unidentified bodies   NO Deaths of persons whose bodies are to be cremated or otherwise disposed of so that the bodies will later be unavailable for examination;   NO Deaths unattended by a physician outside of a licensed healthcare facility or licensed residential hospice program   NO Deaths occurring within 24 hours of arrival to a health care facility if death is unexpected.    NO Deaths associated with the decedent s employment.   NO Deaths attributed to acts of terrorism.   NO Any death in which there is uncertainty as to whether it is a medical examiner s care should be discussed with the medical investigator.        Body disposition: Body released to the morgue.

## 2024-01-15 NOTE — PROGRESS NOTES
Called the  home after the patient family left. Said they will be coming tomorrow to  the remains of the patient. The security was therefore notified to pick and send the [place] [facility] The patient was formally discharge  from the unit at 12:47 am. No personal belongings was accompanying the patient.   See anti-coag encounter 7/30/2021

## 2024-01-15 NOTE — TELEPHONE ENCOUNTER
ANTICOAGULATION  MANAGEMENT    Priya Funez is being discharged from the [facility]tico[facility] ([facility]).    Reason for discharge:     Anticoagulation episode resolved, ACC referral closed, and Standing order discontinued        Marcelino Palomares RN

## 2024-01-15 NOTE — PLAN OF CARE
Writer notified the [facility] at family request phone number [phone].  The  home will take over care of body when notified.

## 2024-01-16 NOTE — DISCHARGE SUMMARY
[facility]  Hospitalist Discharge Summary      Date of Admission:  12/15/2023  Date of Discharge:  1/14/2024 10:15 PM  Discharging Provider: Greg Jimenes MD  Discharge Service: Hospitalist Service    Discharge Diagnoses   Acute Respiratory Failure    Clinically Significant Risk Factors     # Severe Malnutrition: based on nutrition assessment      Hospital Course  Priya Funez is a 83 year old female with a PMHx of HFpEF, severe TR, permanent AF s/p AVN ablation with PPM implant 1/11/19 s/p extraction TV (transvenous) PPM and implant leadless PPM 7/31/23, emergent aortic dissection repair 1/4/19, HTN, CKD, NEDA, and obesity who presented to [facility] as an outpatient for elective TVR done on 11/28/2023.  Immediate postoperative course was complicated by acute hypoxic and hypercapnic respiratory failure requiring BiPAP and probable pneumonia and treated with 7 days of IV Zosyn, patient was transferred to surgical telemetry floor on 12/2/2020.  On 12/6/2023 she was transferred back to CVICU after RRT for acute hypoxic respiratory distress and subsequent respiratory arrest with short CPR, re-intubation, and ROSC achieved. She sustained mildly displaced and nondisplaced right 3-5 rib fractures with associated right chest wall hematoma and nondisplaced anterolateral left 4th and 5th rib fractures. She was treated with meropenem 7 day course. She also has hx of recent C diff and multiple loose stools, for which she completed a 7 day po vancomycin course (per antimicrobial stewardship team, more likely to be colonization with recent history and no apparent treatment). Patient was transiently hyperglycemic and treated with insulin infusion then transitioned to sliding scale insulin per protocol. Blood sugars remained stable, has not required insulin coverage.  She remained intubated in the CVICU until undergoing tracheostomy by Dr. Pitts on 12/13/23, doing well with trach dome and PS currently.  She was treated for volume overload  with diuretics (Bumex and chlorothiazide) with subsequent hypernatremia and hypotension requiring FWF and pressor support.  Spironolactone, torsemide and metoprolol discontinued and midodrine started with now stable blood pressures. Pre-op weight 70.9 kg, discharge weight 74.1 kg (163 lbs) on 12/15/2023.  Patient was discharged to [facility] 12/15/2023 for continuing vent weaning, therapies     Pt initially progressed well at [facility]. She was diuresed successfully and was weaned from her vent. She progressed with PT/OT and was walking. She was eventually decannulated. Shortly thereafter she started having increased dyspnea both with exertion and at rest. W/u was grossly unrevealing but suggestive of mild volume overload with uptrending BNP. Diuresis was increased and she was started on BiPAP. Unfortunately she progressively worsened until she was found unresponsive with CO2 narcosis. Despite medical interventions her CO2 continued to climb. After d/w her  and daughter, it was decided to transition to comfort care. The pt  later in the evening.     Please refer to the Significant Event note dated  for details about the course of events that day.       GOC  - based on finding of the morning, plan for comfort care  - maintain BiPAP until additional family can make it in to see pt  - will withdraw BiPAP at that time  - comfort care meds ordered    Dyspnea  - worsened   - increased bumex to 1.5mg BID  - BiPAP intermittently ovn  - lorazepam 0.5mg q4h prn anxiety  - BMP, Mg, BNP for today  - CXR y/d stable with previous, possibly some increased haziness and perihilar congestion but this is only slight  - satting >95%  - may consider ABG   - would also consider trial of morphine 2.5mg for dyspnea     Acute respiratory failure and arrest on 23   Acute hypercapnic and hypoxic respiratory failure requiring BiPAP  s/p tracheostomy 23  s/p Mechanical ventilation  Aspiration pneumonitis vs  pneumonia  Possible aspiration event 12/1  Near complete plugging of left lower lobe on CT   Acute mildly displaced and nondisplaced right 3-5 rib fractures with associated right chest wall hematoma and nondisplaced anterolateral left fourth and fifth rib fractures  - weaning per pulmonology  -RT for tracheostomy cares, oxygenation, nebs, pulmonary therapies  - Tracheostomy sutures removed (12/18/2023), decannulated (1/11)  - Mucomyst, duoneb w/ RT   - s/p acetazolamide 250mg IV once (1/11)  - albuterol nebs q6 prn (1/13)  - BiPAP as needed     S/p respiratory arrest 12/6  Hx of severe TR s/p TVR  HFpEF  Hx of Afib s/p AVN ablation - PPM, upgraded to leadless 7/2023  Hypertension  Hyperlipidemia  Hx of aortic dissection repair 2019  Respiratory arrest 12/6   - Stable paced rhythm with underlying atrial flutter. On review of cardiology, underlying aflutter/afib has been present since at least June of this year. Device RN adjusted PPM to PTA settings rate 60 at [facility].   - Pre-op 9/8/23 echo: LVEF 55/60%, mildly dilated RV, normal function  - Echo 12/4 with LVEF 60-65%, normal RV function, TV mean gradient 5 mmHg, no TR  - Echo 12/7 with no significant change from previous with the exception of mild reduction of RV. RV free wall dyskinesia.   - ASA 81 mg daily  -was on scheduled midodrine 10 mg Q8H but stopped that 12/16/23 since BP in the 140s systolic and stable  -Continue midodrine 5 mg TID PRN for SBP<100   - Started Warfarin 12/15.    -Discontinued lovenox 12/26/2023. Continue with coumadin INR on goal. Pharmacy consulted for Coumadin dosing (INR goal 2-3)      Acute on chronic diastolic heart failure with Elevated BNP, improving   - Held PTA spironolactone, torsemide, metoprolol due to hypotension at prior hospital  - Echo 12/4 with LVEF 60-65%, normal RV function, TV mean gradient 5 mmHg, no TR  - Echo 12/7 with no significant change from previous with the exception of mild reduction of RV. RV free wall  dyskinesia.   - FLUID STATUS: Pre-op weight 70.9 kg, discharge weight 74.1 kg (163 lbs) on 12/15/2023 and 165 lbs on 12/16  -increased torsemide to 40 mg po daily on 12/19 then switched to bumex 12/22 and increased to bumex 1 mg BID on 12/23- increased to 1.5, now increased bumex to 2 mg IV bid on 12/30- decreased to 1.5 mg IV bid on 1/4 but now weight was plateauing so moved dosing times of bumex and gave one dose metolazone 1/7 AM 1 hour prior to bumex and repeat 1/8.  - increase to bumex 1.5mg PO daily (1/13)  -proBNP trend appears improved from a peak of 88544 to now 3996 1/5  -has a new blister on her right foot likely due to fluid retention  -edema improving with leg elevation above heart and encouraged ambulation    blister on top of Right foot  - likely due to fluid retention  -blister popped on 12/30  -monitor for cellulitis    Multiple loose stools   Hx of C. Difficile (9/2023)  Severe malnutrition in the context of acute illness  - s/p tube feeds   -passed swallow on 1/2, diet changed from tube feeds to bite sized and thin liquids.    - PPI famotidine  - Bowel regimen discontinued due to continued loose stools  - Finished vancomycin po 7 day course   - PRN Zofran  4 mg PO or IV Q6H  - PRN Compazine 5 mg IV Q6H   - loperamide 2 mg BID and prn     Hypernatremia   Hypokalemia  CKD stage 3  Lactic acidosis, resolved  IMAN on CKD, resolved   Edema  Baseline creat ~1.2-1.3   FLUID STATUS: Pre-op weight 70.9 kg, discharge weight 74.1 kg (163 lbs).   - Elevated sodium- it is now normal  -On free water flushes 30ml q4h  - I/O has been inaccurate due to unmeasured stools and voids.  - Replete lytes per protocol  - increase to KCl 40mEq BID (1/13) with prn replacement  - Avoid/limit nephrotoxins as able  -daily weights    Stress induced hyperglycemia  T2DM  Hgb A1c 5.7%, stable off meds  - PTA Jardiance on hold, resume once medically optimized   - Goal BG <180 for optimal healing     Stress induced  leukocytosis  Probable aspiration pneumonitis vs pneumonia, resolved  - 11/30 Zosyn 7-day course for pneumonia (finished)   - 12/6 Given further decompensation, Vanco and meropenem was started on 12/6   - Finished meropenem 7 day course (12/12)   - Finished Vancomycin PO 7 day course (for c-diff) (12/13)  - Blood culture with NGTD   - Urine with Candida and sputum with Yeast, likely both colonizations.   - Urine culture with NGTD     Hx of recent C. Difficile  - C. Difficile toxin PCR positive 12/3 with negative antigen and negative toxin - per antimicrobial stewardship team, more likely to be colonization with recent history and no apparent treatment  - Finished oral vancomycin course on 12/13, still with diarrhea but no abd pain, leucocytosis, or fevers so monitoring for now  - Monitor fever curve, WBC, and inflammatory markers as appropriate  - Enteric precautions per infection prevention protocol      3rd, 4th right rib fractures due to CPR  -Continue mini thoracotomy precautions  -Judicious use of narcotics, de-escalate as able     Acute blood loss anemia  - Hgb has been stable.   -Hgb 7.6 on 12/16 and 8.0 on 12/17/2023  -Iron studies: Serum iron 29, iron binding capacity 216, saturation index 13%.  -Follow CBC with weekly labs.   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   -f/up outpt     Diet: Snacks/Supplements Adult: Ensure Clear; With Meals  Regular Diet Adult    DVT Prophylaxis: warfarin  Barrientos Catheter: Not present  Lines: PRESENT      PICC 12/07/23 Triple Lumen Right Basilic ok to use PICC-Site Assessment: WDL      Cardiac Monitoring: None  Code  Status: No CPR- Do NOT Intubate      Consultations This Hospital Stay   NUTRITION SERVICES ADULT IP CONSULT  PHARMACY IP CONSULT  PHARMACY TO DOSE WARFARIN  NUTRITION SERVICES ADULT IP CONSULT  OCCUPATIONAL THERAPY ADULT IP CONSULT  PHYSICAL THERAPY ADULT IP CONSULT  PHARMACY TO DOSE WARFARIN  SPEECH LANGUAGE PATH ADULT IP CONSULT  SPEAKING VALVE WITH CUFF DEFLATION IP CONSULT  PULMONARY IP CONSULT  SPIRITUAL HEALTH SERVICES IP CONSULT  CARE MANAGEMENT / SOCIAL WORK IP CONSULT  WOUND OSTOMY CONTINENCE NURSE  IP CONSULT  LYMPHEDEMA THERAPY IP CONSULT  SPIRITUAL HEALTH SERVICES IP CONSULT  SPIRITUAL HEALTH SERVICES IP CONSULT  SMOKING CESSATION PROGRAM IP CONSULT  OCCUPATIONAL THERAPY ADULT IP CONSULT    Code Status   Prior         Greg Jimenes MD  [facility] [address] [place] [address]  Phone: [phone]  Fax: [phone]  ______________________________________________________________________         Primary Care Physician   Sharmaine Burks    Discharge Orders   No discharge procedures on file.    Discharge Medications   Discharge Medication List as of 1/15/2024  1:03 AM        CONTINUE these medications which have NOT CHANGED    Details   Acetaminophen 325 MG CAPS Take 325-650 mg by mouth every 4 hours as needed (Pain/Fever), Historical      albuterol (PROAIR HFA/PROVENTIL HFA/VENTOLIN HFA) 108 (90 Base) MCG/ACT inhaler Inhale 2 puffs into the lungs every 6 hours as needed for shortness of breath / dyspnea or wheezing, Disp-18 g, R-0, E-PrescribePharmacy may dispense brand covered by insurance (Proair, or proventil or ventolin or generic albuterol inhaler)      aspirin (ASA) 81 MG EC tablet Take 81 mg by mouth daily, Historical      cholecalciferol 25 MCG (1000 UT) TABS Take 1,000 Units by mouth daily , Historical      cyanocobalamin (VITAMIN B-12) 1000 MCG tablet Take 1,000 mcg by mouth daily , Historical      empagliflozin (JARDIANCE) 10 MG TABS tablet Take 1 tablet  (10 mg) by mouth daily, Disp-90 tablet, R-3, E-Prescribe      escitalopram (LEXAPRO) 10 MG tablet Take 1 tablet (10 mg) by mouth daily, Disp-90 tablet, R-3, E-Prescribe      metoprolol tartrate (LOPRESSOR) 25 MG tablet Take 1 tablet (25 mg) by mouth 2 times daily, Disp-180 tablet, R-3, E-Prescribe      mirtazapine (REMERON) 15 MG tablet Take 1 tablet (15 mg) by mouth At Bedtime, Disp-90 tablet, R-3, E-Prescribe      spironolactone (ALDACTONE) 25 MG tablet Take 1 tablet (25 mg) by mouth daily, Disp-90 tablet, R-3, No Print Out      torsemide (DEMADEX) 10 MG tablet Take 4 tablets(40mg) by mouth every day, Disp-360 tablet, R-3, No Print Out      warfarin ANTICOAGULANT (COUMADIN) 2 MG tablet Take 2 to 4mg (1 to 2 tabs) by mouth daily OR AS DIRECTED.  Adjust dose based on INR., Disp-115 tablet, R-1, E-PrescribeCurrent dose is 4mg Sun/Thur and 2mg ROW           Allergies   Allergies   Allergen Reactions    Bactrim [Sulfamethoxazole-Trimethoprim]      pancytopenia    Amoxicillin Swelling     Face and body    Ciprofloxacin Hives

## 2024-02-06 LAB
MDC_IDC_MSMT_BATTERY_DTM: NORMAL
MDC_IDC_MSMT_BATTERY_REMAINING_LONGEVITY: 54 MO
MDC_IDC_MSMT_BATTERY_RRT_TRIGGER: 2.56
MDC_IDC_MSMT_BATTERY_STATUS: NORMAL
MDC_IDC_MSMT_BATTERY_VOLTAGE: 2.99 V
MDC_IDC_MSMT_LEADCHNL_RV_IMPEDANCE_VALUE: 470 OHM
MDC_IDC_MSMT_LEADCHNL_RV_PACING_THRESHOLD_AMPLITUDE: 1.63 V
MDC_IDC_MSMT_LEADCHNL_RV_PACING_THRESHOLD_PULSEWIDTH: 0.24 MS
MDC_IDC_MSMT_LEADCHNL_RV_SENSING_INTR_AMPL: 14.96 MV
MDC_IDC_PG_IMPLANT_DTM: NORMAL
MDC_IDC_PG_MFG: NORMAL
MDC_IDC_PG_MODEL: NORMAL
MDC_IDC_PG_SERIAL: NORMAL
MDC_IDC_PG_TYPE: NORMAL
MDC_IDC_SESS_CLINIC_NAME: NORMAL
MDC_IDC_SESS_DTM: NORMAL
MDC_IDC_SESS_TYPE: NORMAL
MDC_IDC_SET_BRADY_HYSTRATE: NORMAL
MDC_IDC_SET_BRADY_LOWRATE: 80 {BEATS}/MIN
MDC_IDC_SET_BRADY_MAX_SENSOR_RATE: 120 {BEATS}/MIN
MDC_IDC_SET_BRADY_MODE: NORMAL
MDC_IDC_SET_LEADCHNL_RV_PACING_AMPLITUDE: 3 V
MDC_IDC_SET_LEADCHNL_RV_PACING_ANODE_ELECTRODE_1: NORMAL
MDC_IDC_SET_LEADCHNL_RV_PACING_ANODE_LOCATION_1: NORMAL
MDC_IDC_SET_LEADCHNL_RV_PACING_CAPTURE_MODE: NORMAL
MDC_IDC_SET_LEADCHNL_RV_PACING_CATHODE_ELECTRODE_1: NORMAL
MDC_IDC_SET_LEADCHNL_RV_PACING_CATHODE_LOCATION_1: NORMAL
MDC_IDC_SET_LEADCHNL_RV_PACING_POLARITY: NORMAL
MDC_IDC_SET_LEADCHNL_RV_PACING_PULSEWIDTH: 0.24 MS
MDC_IDC_SET_LEADCHNL_RV_SENSING_ANODE_ELECTRODE_1: NORMAL
MDC_IDC_SET_LEADCHNL_RV_SENSING_ANODE_LOCATION_1: NORMAL
MDC_IDC_SET_LEADCHNL_RV_SENSING_CATHODE_ELECTRODE_1: NORMAL
MDC_IDC_SET_LEADCHNL_RV_SENSING_CATHODE_LOCATION_1: NORMAL
MDC_IDC_SET_LEADCHNL_RV_SENSING_POLARITY: NORMAL
MDC_IDC_SET_LEADCHNL_RV_SENSING_SENSITIVITY: 2 MV
MDC_IDC_SET_ZONE_TYPE: NORMAL
MDC_IDC_SET_ZONE_VENDOR_TYPE: NORMAL
MDC_IDC_STAT_BRADY_DTM_END: NORMAL
MDC_IDC_STAT_BRADY_DTM_START: NORMAL
MDC_IDC_STAT_BRADY_RV_PERCENT_PACED: 100 %

## 2024-12-09 NOTE — TELEPHONE ENCOUNTER
Daily Note     Today's date: 2024  Patient name: Juhi Tena  : 1983  MRN: [id number]  Referring provider: Josue Barker PA-C  Dx:   Encounter Diagnosis     ICD-10-CM    1. 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      Objective: See treatment diary below      Assessment: Tolerated treatment fair. Patient continues with radial wrist pain/soreness, Subluxation of the RF extensor tendon has not changed, compliant with yoke splint.       Plan: Continue per plan of care.      Precautions:   Sx 24  F/u with MD 24       CO-MORBIDITIES:  HEP ACCESS CODE:   FOTO Completed On:     POC Expires Reeval for Medicare to be completed Unit LImit Auth Expiration Date PT/OT/STVisit Limit     By visit 27  NA   na    Completed on visit               ows at top of treatment diary - top left boxes should look like the example below       TREATMENT DIARY  Auth Status                   Visit # 15   Visits Remaining 1           13 14 15 16 17 18     Manuals   RE   TPR ext mass 2m 2m    2m 2m 2m  2m   Scar mob 2m 2m 4m 4m 4m 3m 3m 2m 2m 2m   retrograde 3m 3m 4m 4m 4m 3m 3m 3m 3m 3m   Graston R 4m 4m 4m 4m 4m 4m 4m 4m 4m 4m   Neuro Re-Ed             KT       DW       Wrist A/AAROM, TGEs             Yoke splint for R rng sag band                                                                   Ther Ex             Wrist A/AAROM 3# and 2#  3x10 3#  3x10 3#  3x10 4#   3x10 #3 3x10 3#    3x10 3#  3x10 #3  3x10 #2  3x10 3#  3x10   P/S A/AAROM red  2x10 Red  3x10 Red  3x10 SM 3x10  SM  3x10 red  2x10 LG  3x10 Sm  3x10 R  3x10   Foam roll stretch flex/ext        2x10     [place]in stretch    x10 x10   x20      Wrist circles 2.2# ball 2m Marbles 2 m 2.2#   ball  2m Marbles 2m 2.2# 2m 2.2#  2m 2.2#  2m 2.2#  2m Marbles  2m 2.2#  ball  2m   Received phone call from patient's daughter Susie asking about the [employer] PPM Safety Advisory. I explained that there is only a 0.0028% chance that the battery would deplete early, and our physicians are choosing not to replace devices because the risk at gen change is higher than the risk of battery depletion. Told Susie that the best thing to do is keep the Carelink monitor always plugged in at pt's bedside. Susie said pt is in a nursing home, and has her Carelink plugged in there. Also told Susie that if pt has any lightheadedness, severe SOB/fatigue, or if she ever passes out, she should go right away to the ER to make sure her PPM is okay. Susie states understanding.      Powerweb   BPW 3x10 BPW  2x30 BPW  2x30 BPW 2x30 BPW 2x30 BPW 2x30 BPW 2x30 BlackPW 3x10 BPW  5x10 BPW  3x10   Powerweb  simulating opening jar/wrist radial deviation GPW 3x10        YPW  3x10 GPW  10x3  Wrist/forearm stabilization with little oscillations GPW 3x10    Scrub   1m  50%            Ther Activity             Peg  w. supination              Pinch pins  Blue  3x10 Blue 5 sets, varying grasps Black  1 set  Blue  2 sets  black  3sets Blue  3 sets G  3sets  G 3 sets, varying grasps Blue   5 sets Blue  3x10    Work sim lift  B  11# 2x10 11#  3x10  11#  3x10  #11 3x10 ball 11#  3x10  ball 11# ball  3x10 #11 ball 3x10 11#  2x10 11#  2x10   UBE  5m 5m   6m 6m 6m level 1 6m level 2.5 5m 5m                Modalities             MH R 8m 8m 8m 8m 8m 6m 6m 8m 6m 6m   CP post Post tx 5m 5m 5m 5m 5m 5m 5m 5m

## 2025-01-10 NOTE — PROGRESS NOTES
ANTICOAGULATION MANAGEMENT     Priya Funez 82 year old female is on warfarin with therapeutic INR result. (Goal INR 2.0-3.0)    Recent labs: (last 7 days)     06/08/23  1237   INR 2.05*       ASSESSMENT       Source(s): Chart Review and Patient/Caregiver Call       Warfarin doses taken: Warfarin taken as instructed    Diet: No new diet changes identified    Medication/supplement changes: None noted    New illness, injury, or hospitalization: No    Signs or symptoms of bleeding or clotting: No    Previous result: Subtherapeutic    Additional findings: Pt scheduled for a ALEX echo with anesthesia on 6/16/23         PLAN       Dosing Instructions: Continue your current warfarin dose with next INR in 10 days       Summary  As of 6/8/2023    Full warfarin instructions:  4 mg every Sun, Thu; 2 mg all other days   Next INR check:  6/19/2023             Telephone call with Priya who verbalizes understanding and agrees to plan    Lab visit scheduled    Education provided:     Please call back if any changes to your diet, medications or how you've been taking warfarin    Contact [phone]  with any changes, questions or concerns.     Plan made per [facility] anticoagulation protocol    Susie Orosco RN  Anticoagulation Clinic  6/9/2023    _______________________________________________________________________     Anticoagulation Episode Summary     Current INR goal:  2.0-3.0   TTR:  64.0 % (1 y)   Target end date:  Indefinite   Send INR reminders to:  [facility]    Indications    Chronic atrial fibrillation (H) [I48.20]  Current use of long term anticoagulation (Resolved) [Z79.01]           Comments:           Anticoagulation Care Providers     Provider Role Specialty Phone number    Sharmaine Burks MD Referring Internal Medicine [phone]           Detail Level: Detailed Depth Of Biopsy: dermis Was A Bandage Applied: Yes Size Of Lesion In Cm: 0.5 Biopsy Type: H and E Biopsy Method: Personna blade Anesthesia Type: 1% lidocaine with epinephrine Additional Anesthesia Volume In Cc (Will Not Render If 0): 0 Hemostasis: Magdalena's Wound Care: Vaseline Dressing: Band-Aid Destruction After The Procedure: No Type Of Destruction Used: Curettage Curettage Text: The wound bed was treated with curettage after the biopsy was performed. Cryotherapy Text: The wound bed was treated with cryotherapy after the biopsy was performed. Electrodesiccation Text: The wound bed was treated with electrodesiccation after the biopsy was performed. Electrodesiccation And Curettage Text: The wound bed was treated with electrodesiccation and curettage after the biopsy was performed. Silver Nitrate Text: The wound bed was treated with silver nitrate after the biopsy was performed. Lab: 6 Lab Facility: 3 Medical Necessity Information: It is in your best interest to select a reason for this procedure from the list below. All of these items fulfill various CMS LCD requirements except the new and changing color options. Consent: Written consent was obtained and risks were reviewed including but not limited to scarring, infection, bleeding, scabbing, incomplete removal, nerve damage and allergy to anesthesia. Post-Care Instructions: I reviewed with the patient in detail post-care instructions. Patient is to keep the biopsy site dry overnight, and then apply Vaseline twice daily until healed. Patient may apply hydrogen peroxide soaks to remove any crusting. Notification Instructions: Patient will be notified of biopsy results. However, patient instructed to call the office if not contacted within 2 weeks. Billing Type: Third-Party Bill Information: Selecting Yes will display possible errors in your note based on the variables you have selected. This validation is only offered as a suggestion for you. PLEASE NOTE THAT THE VALIDATION TEXT WILL BE REMOVED WHEN YOU FINALIZE YOUR NOTE. IF YOU WANT TO FAX A PRELIMINARY NOTE YOU WILL NEED TO TOGGLE THIS TO 'NO' IF YOU DO NOT WANT IT IN YOUR FAXED NOTE.

## 2025-04-16 NOTE — PROCEDURES
RADIOLOGY POST PROCEDURE NOTE    Patient name: Priya Funez  MRN: [id number]  : 1940    Pre-procedure diagnosis: Pleural effusion  Post-procedure diagnosis: Same    Procedure Date/Time: 2019  10:34 AM  Procedure: US guided right thoracentesis  Estimated blood loss: None  Specimen(s) collected with description: right pleural fluid    The patient tolerated the procedure well with no immediate complications.  Significant findings:  Bedside/portable right throacentesis    See imaging dictation for procedural details.    Provider name: Francesco Mckeon  Assistant(s):None           Tiffany Avalos discharged to home accompanied by family member.   Patient provided with the following educational materials upon discharge:  AVS.   Valuables and belongings sent with patient.   discharge summary, discharge instructions, medications, and follow up appointments reviewed with patient and family member.  Patient and family member verbalized understanding

## (undated) DEVICE — SPONGE PEANUT

## (undated) DEVICE — SU PROLENE 3-0 SHDA 36" 8522H

## (undated) DEVICE — ESU ELEC BLADE 2.75" COATED/INSULATED E1455

## (undated) DEVICE — SU ETHIBOND 3-0 BBDA 36" X588H

## (undated) DEVICE — SU PROLENE 3-0 SHDA 48" 8534H

## (undated) DEVICE — SURGICEL POWDER ABSORBABLE HEMOSTAT 3GM 3013SP

## (undated) DEVICE — SU ETHIBOND 2-0 CT-2 CR 8X18" CX26D

## (undated) DEVICE — DRSG STERI STRIP 1/2X4" R1547

## (undated) DEVICE — MANIFOLD KIT ANGIO AUTOMATED 014613

## (undated) DEVICE — SU PROLENE 5-0 RB-2DA 30" 8710H

## (undated) DEVICE — DECANTER BAG 2002S

## (undated) DEVICE — SU PROLENE 4-0 SHDA 36" 8521H

## (undated) DEVICE — PROTECTOR ARM ONE-STEP TRENDELENBURG 40418

## (undated) DEVICE — SU SILK 2-0 SH 30" K833H

## (undated) DEVICE — CATH ANGIO JUDKINS JL4 6FRX100CM INFINITI 534620T

## (undated) DEVICE — SU PLEDGET SOFT TFE 3/8"X3/26"X1/16" PCP40

## (undated) DEVICE — ENDO TROCAR FIRST ENTRY KII FIOS Z-THRD 05X100MM CTF03

## (undated) DEVICE — PACK EP SRG PROC LF DISP SAN32EPFSR

## (undated) DEVICE — ESU ELEC BLADE 6" COATED E1450-6

## (undated) DEVICE — ANTIFOG SOLUTION W/FOAM PAD 31142527

## (undated) DEVICE — COVER NEOPROBE SOFTFLEX 5X96" W/BANDS 20-PC596

## (undated) DEVICE — INTRO SHEATH 7FRX10CM PINNACLE RSS702

## (undated) DEVICE — SU ETHIBOND 2-0 SHDA 30" X563H

## (undated) DEVICE — TUBING SUCTION MEDI-VAC SOFT 3/16"X20' N520A

## (undated) DEVICE — ESU HOLSTER PLASTIC DISP E2400

## (undated) DEVICE — SUCTION CATH AIRLIFE TRI-FLO W/CONTROL PORT 14FR  T60C

## (undated) DEVICE — CLIP HORIZON SM RED WIDE SLOT 001201

## (undated) DEVICE — NDL COUNTER 40CT  31142311

## (undated) DEVICE — DEFIB PRO-PADZ LVP LQD GEL ADULT 8900-2105-01

## (undated) DEVICE — BIPOLAR TEMPORARY MYOCARDIAL PACING LEAD

## (undated) DEVICE — SOL ADH LIQUID BENZOIN SWAB 0.6ML C1544

## (undated) DEVICE — SU SILK 1 TIE 10X30" SA87G

## (undated) DEVICE — SU ETHILON 3-0 FS-1 18" 669H

## (undated) DEVICE — PREP CHLORAPREP W/ORANGE TINT 10.5ML 260715

## (undated) DEVICE — BLADE SAW OSCILLATING STRK 53X32X0.38MM 5400-134-282

## (undated) DEVICE — DRSG DRAIN 4X4" 7086

## (undated) DEVICE — SU VICRYL 0 CTX 36" J370H

## (undated) DEVICE — INSERT INTRACK 66M CONFORMING N-1012V

## (undated) DEVICE — Device

## (undated) DEVICE — KIT MICRO-INTRODUCER STIFFEN 4FR 7274V

## (undated) DEVICE — SHEATH INTRODUCER 0.035"X11CM 6FR 15-712B

## (undated) DEVICE — GLIDEWIRE TERUMO .035X180 ANG STIFF GS3508

## (undated) DEVICE — CLIP HORIZON MED BLUE 002200

## (undated) DEVICE — SU VICRYL 3-0 FS-1 27" J442H

## (undated) DEVICE — TIES BANDING T50R

## (undated) DEVICE — GLOVE BIOGEL PI MICRO SZ 7.5 48575

## (undated) DEVICE — COVER ULTRASOUND PROBE W/GEL FLEXI-FEEL 6"X58" LF  25-FF658

## (undated) DEVICE — DRAIN SUMP INTRACARDIAC 20FR 12" POOL TIP 12112

## (undated) DEVICE — SU PROLENE 3-0 RB-1DA 36"  8558H

## (undated) DEVICE — TUBING PRESSURE TRANSDUCER MALE TO FEMALE LL 72" 50P172

## (undated) DEVICE — LINEN LEG ROLL 5489

## (undated) DEVICE — COVER TABLE POLY 65X90" 8186

## (undated) DEVICE — SUCTION TIP YANKAUER STR K87

## (undated) DEVICE — CATH EP 110CM 7FR BLZR2 HTD 2

## (undated) DEVICE — CANNULA PERFUSION AORTIC ROOT 22FR 20012

## (undated) DEVICE — SU VICRYL 2-0 CT-1 27" UND J259H

## (undated) DEVICE — SU PROLENE 4-0 RB-1DA 36" 8557H

## (undated) DEVICE — TUBING SUCTION DRAINAGE PLEURAL DUAL 8884714200

## (undated) DEVICE — TUBING SUCTION 12"X1/4" N612

## (undated) DEVICE — ESU PENCIL SMOKE EVAC W/ROCKER SWITCH 0703-047-000

## (undated) DEVICE — LINEN TOWEL PACK X30 5481

## (undated) DEVICE — SU PROLENE 2-0 RB-1DA 36" 8559H

## (undated) DEVICE — KIT VASC DILATOR ESTECH 200-120

## (undated) DEVICE — COVER EQUIPMENT 36X40" BANDED ELAS OPN LF 01-3640

## (undated) DEVICE — PACK MINOR SBA15MIFSE

## (undated) DEVICE — SYR 10ML SLIP TIP W/O NDL 303134

## (undated) DEVICE — SU PROLENE 6-0 C-1DA 30" M8706

## (undated) DEVICE — KIT PREP BRIDGE 591-001

## (undated) DEVICE — NDL 18GA 1.5" 305196

## (undated) DEVICE — SUCTION CANISTER MEDIVAC LINER 3000ML W/LID 65651-530

## (undated) DEVICE — KIT HAND CONTROL ANGIOTOUCH ACIST 65CM AT-P65

## (undated) DEVICE — CABLE MYO/LEAD PACING BLUE DISP 019-535

## (undated) DEVICE — DRAIN CHEST TUBE 32FR STR 8032

## (undated) DEVICE — INTRO GLIDESHEATH SLENDER 6FR 10X45CM 60-1060

## (undated) DEVICE — LINEN TOWEL PACK X6 WHITE 5487

## (undated) DEVICE — KIT WASH CELL SAVING ATL2001

## (undated) DEVICE — ADPT PERFUSION MULTIPLE

## (undated) DEVICE — SU DERMABOND ADVANCED .7ML DNX12

## (undated) DEVICE — SPONGE RAY-TEC 4X8" 7318

## (undated) DEVICE — CANNULA PERFUSION VENOUS 30FR 12-15" SGL STAGE STR 66130

## (undated) DEVICE — BONE WAX 2.5GM W31G

## (undated) DEVICE — SYR EAR BULB 3OZ 0035830

## (undated) DEVICE — CANNULA PERFUSION 14FRX16" LEFT 12016

## (undated) DEVICE — PREP CHLORAPREP 26ML TINTED HI-LITE ORANGE 930815

## (undated) DEVICE — SU STEEL 6 CCS 4X18" M654G

## (undated) DEVICE — DRAPE IOBAN INCISE 23X17" 6650EZ

## (undated) DEVICE — ESU GROUND PAD UNIVERSAL W/O CORD

## (undated) DEVICE — TUBE GASTROSTOMY MIC PULL PEG KIT 20FR 7160-20

## (undated) DEVICE — SU PROLENE 7-0 BV-1DA 4X24" M8702

## (undated) DEVICE — KIT STYLET EXT TAPER BALL TIP 6082-52CM

## (undated) DEVICE — CATH ULTRASOUND 8.5FRX110CM ICE FLUID M00499120

## (undated) DEVICE — SU PROLENE 6-0 C-1DA 30" 8706H

## (undated) DEVICE — SU STEEL MYO/WIRE II STERNOTOMY 8 BE-1 3X14" 048-217

## (undated) DEVICE — SU PLEDGET SOFT TFE 13MMX7MMX1.5MM D7044

## (undated) DEVICE — DRAPE C-ARM 60X42" 1013

## (undated) DEVICE — KIT WRENCH 5873W

## (undated) DEVICE — DRAPE COVER ROBOTIC ARM (UNITRAC RETRACTOR) JG901

## (undated) DEVICE — DRAPE MAYO STAND 23X54 8337

## (undated) DEVICE — GUIDEWIRE VERSACORE 0.035"X300CM  J-TIP 1012068-07

## (undated) DEVICE — CATH ANGIO INFINITI 3DRC 6FRX100CM 534676T

## (undated) DEVICE — SU DEVICE ENDO COR KNOT QUICK LOAD 030850

## (undated) DEVICE — NDL 30GA 0.5" 305106

## (undated) DEVICE — SU ETHIBOND 2-0 SH-2 DA 30" PXX80

## (undated) DEVICE — SU SILK 0 TIE 6X30" A306H

## (undated) DEVICE — PACK PCMKR PERM SRG PROC LF SAN32PC573

## (undated) DEVICE — INTRO CATH 12CM 8.5FR FST-CATH

## (undated) DEVICE — SU SILK 2-0 TIE 12X30" A305H

## (undated) DEVICE — TUBING INSUFFLATION PNEUMOCLEAR 0620050100

## (undated) DEVICE — GLOVE DERMASSURE GREEN PF 7.5 LATEX FREE MSG6575

## (undated) DEVICE — SURGICEL HEMOSTAT 2X14" 1951

## (undated) DEVICE — SU UMBILICAL TAPE 36X.125" U12T

## (undated) DEVICE — PACK MINI VAC CUSTOM CARDOPULMONARY BB5Z97R15

## (undated) DEVICE — PACK NEURO MINOR UMMC SNE32MNMU4

## (undated) DEVICE — SLEEVE TR BAND RADIAL COMPRESSION DEVICE 24CM TRB24-REG

## (undated) DEVICE — CONNECTOR BLAKE DRAIN SGL BCC1

## (undated) DEVICE — GLOVE PROTEXIS W/NEU-THERA 7.5  2D73TE75

## (undated) DEVICE — PACK TUBING MINI VAC CUSTOM 1/2X3/8T BB9J78R4

## (undated) DEVICE — DRAPE MINOR PROCEDURE LAP 29496

## (undated) DEVICE — ENDO VALVE SUCTION BRONCH EVIS MAJ-209

## (undated) DEVICE — SU SILK 3-0 TIE 12X30" A304H

## (undated) DEVICE — BLADE SAW STERNAL 20X30MM KM-32

## (undated) DEVICE — SHEATH PRELUDE SNAP 7FRX13CM PLS-1007

## (undated) DEVICE — GLOVE PROTEXIS W/NEU-THERA 6.5  2D73TE65

## (undated) DEVICE — DRAIN CHEST TUBE RIGHT ANGLED 32FR 8132

## (undated) DEVICE — DRSG PRIMAPORE 02X3" 7133

## (undated) DEVICE — GOWN IMPERVIOUS ZONED LG

## (undated) DEVICE — ENDO DISSECTOR BLUNT 10MM CHERRY BCD10

## (undated) DEVICE — RAD INTRODUCER KIT MICRO 5FRX10CM .018 NITINOL G/W

## (undated) DEVICE — SU PROLENE 7-0 BV175-6 24' M8737

## (undated) DEVICE — CATH DIAGNOSTIC RADIAL 5FR TIG 4.0

## (undated) DEVICE — KIT ACCESSORY DEVICE LLD SPECTRANETICS 518-027

## (undated) DEVICE — SU ETHIBOND 0 CT-1 CR 8X18" CX21D

## (undated) DEVICE — KIT ROTATION TOOL  6056

## (undated) DEVICE — CLIP HORIZON LG ORANGE 004200

## (undated) DEVICE — DRAPE FLUID WARMING 52 X 60" ORS-321

## (undated) DEVICE — DRSG TEGADERM 8X12" 1629

## (undated) DEVICE — CONNECTOR PERFUSION Y TYPE 1/2X3/8X3/8" W/O LL 6040

## (undated) DEVICE — TOURNIQUET VASCULAR

## (undated) DEVICE — DRAPE STERI TOWEL SM 1000

## (undated) DEVICE — SUCTION DRY CHEST DRAIN OASIS 3600-100

## (undated) DEVICE — CANNULA PERFUSION 24FR SGL STAGE RT ANGLE 69324

## (undated) DEVICE — SOL NACL 0.9% IRRIG 1000ML BOTTLE 07138-09

## (undated) DEVICE — ENDO ADPT BRONCH SWIVEL Y A1002

## (undated) DEVICE — TOURNIQUET VASCULAR KIT ARGYLE 8888-585000

## (undated) DEVICE — PREP POVIDONE-IODINE 7.5% SCRUB 4OZ BOTTLE MDS093945

## (undated) DEVICE — RX SURGIFLO HEMOSTATIC MATRIX W/THROMBIN 8ML 2994

## (undated) DEVICE — LUBRICANT INST KIT ENDO-LUBE 220-90

## (undated) DEVICE — WIPES FOLEY CARE SURESTEP PROVON DFC100

## (undated) DEVICE — SU VICRYL 3-0 SH 27" UND J416H

## (undated) DEVICE — ANTIFOG SOLUTION W/FOAM PAD CF-1001

## (undated) DEVICE — TUBING PERFUSION 1/4X1/16X8FT

## (undated) DEVICE — CONNECTOR PERFUSION 3/8X3/8" W/O LL 6023

## (undated) DEVICE — INTRO SHEATH 6FRX10CM PINNACLE RSS602

## (undated) DEVICE — LINEN TOWEL PACK X5 5464

## (undated) DEVICE — DRSG PRIMAPORE 03 1/8X6" 66000318

## (undated) DEVICE — SYR 20ML LL W/O NDL 302830

## (undated) DEVICE — ADH BIOGLUE CARTRIDGE 10ML BG3510-5-US

## (undated) DEVICE — PACK ADULT HEART UMMC PV15CG92D

## (undated) DEVICE — SPONGE LAP 18X18" X8435

## (undated) DEVICE — RESERVOIR CELL SAVING BLOOD COLLECTION EL2120

## (undated) DEVICE — DEVICE LLD EZ LEAD LOCKING STYLET SPECTRANETICS 518-062

## (undated) DEVICE — VESSEL LOOPS RED MAXI

## (undated) DEVICE — PACK OPEN HEART PV12OH524

## (undated) DEVICE — INTRODUCER SHEATH MICRA

## (undated) DEVICE — ESU ELEC BLADE E-SEP INSULATED NEPTUNE 70MM 0703-070-002

## (undated) DEVICE — ORGANIZER SUTURE CIRCUMFERENTIAL BELT MI-ISBA-001

## (undated) DEVICE — PREP POVIDONE-IODINE 10% SOLUTION 4OZ BOTTLE MDS093944

## (undated) DEVICE — CATH ULTRASOUND 9FR ULTRA ICE FLUID DOCK M00499151

## (undated) DEVICE — DEVICE ASSEMBLY SUCTION/ANTI COAG BTC93

## (undated) DEVICE — WIRE GUIDE 0.035"X260CM SAFE-T-J EXCHANGE G00517

## (undated) DEVICE — CABLE PACING ALLIGATOR CLIP 12FT 5833SL

## (undated) DEVICE — SOL WATER IRRIG 1000ML BOTTLE 2F7114

## (undated) DEVICE — SU VICRYL 4-0 PS-2 18" UND J496H

## (undated) DEVICE — SU PROLENE 5-0 C-1DA 36" 8720H

## (undated) DEVICE — SOL NACL 0.9% INJ 1000ML BAG 2B1324X

## (undated) DEVICE — PREP SKIN SCRUB TRAY 4461A

## (undated) DEVICE — LEAD PACER MYOCARDIAL BIPOLAR TEMPORARY 53CM 6495F

## (undated) DEVICE — CANNULA PERFUSION KIT FEM ARTERIAL 15FR 17" CB96535-017

## (undated) DEVICE — SU PROLENE 5-0 RB-2DA 18" 8713H

## (undated) DEVICE — SU DEVICE COR-KNOT MINI 4X14MM 031350

## (undated) DEVICE — SURGICEL HEMOSTAT 4X8" 1952

## (undated) DEVICE — DRAPE C-ARM W/STRAPS 42X72" 07-CA104

## (undated) DEVICE — SU ETHIBOND 0 CT-1 30" X424H

## (undated) DEVICE — PUNCH AORTIC 4.0MMX8" RCB40

## (undated) DEVICE — SUCTION MANIFOLD NEPTUNE 2 SYS 4 PORT 0702-020-000

## (undated) DEVICE — LINEN GOWN XLG 5407

## (undated) DEVICE — SOL NACL 0.9% IRRIG 1000ML BOTTLE 2F7124

## (undated) DEVICE — SOL WATER 10ML VIAL 6332318510

## (undated) DEVICE — DRAIN CHEST TUBE 28FR STR 8028

## (undated) DEVICE — SPONGE KITTNER 30-101

## (undated) DEVICE — CLIPPER LEAD WIRE EXTRACTION LR-CLP001 G20003

## (undated) DEVICE — SU ETHIBOND 5 V-37 4X30" MB66G

## (undated) DEVICE — ENDO VALVE BX EVIS MAJ-210

## (undated) RX ORDER — GLYCOPYRROLATE 0.2 MG/ML
INJECTION, SOLUTION INTRAMUSCULAR; INTRAVENOUS
Status: DISPENSED
Start: 2023-01-01

## (undated) RX ORDER — HEPARIN SODIUM 1000 [USP'U]/ML
INJECTION, SOLUTION INTRAVENOUS; SUBCUTANEOUS
Status: DISPENSED
Start: 2019-01-04

## (undated) RX ORDER — FENTANYL CITRATE 50 UG/ML
INJECTION, SOLUTION INTRAMUSCULAR; INTRAVENOUS
Status: DISPENSED
Start: 2023-01-01

## (undated) RX ORDER — DEXAMETHASONE SODIUM PHOSPHATE 10 MG/ML
INJECTION, SOLUTION INTRAMUSCULAR; INTRAVENOUS
Status: DISPENSED
Start: 2023-01-01

## (undated) RX ORDER — HEPARIN SODIUM 1000 [USP'U]/ML
INJECTION, SOLUTION INTRAVENOUS; SUBCUTANEOUS
Status: DISPENSED
Start: 2023-01-01

## (undated) RX ORDER — VECURONIUM BROMIDE 1 MG/ML
INJECTION, POWDER, LYOPHILIZED, FOR SOLUTION INTRAVENOUS
Status: DISPENSED
Start: 2019-01-04

## (undated) RX ORDER — ONDANSETRON 2 MG/ML
INJECTION INTRAMUSCULAR; INTRAVENOUS
Status: DISPENSED
Start: 2023-01-01

## (undated) RX ORDER — FENTANYL CITRATE 50 UG/ML
INJECTION, SOLUTION INTRAMUSCULAR; INTRAVENOUS
Status: DISPENSED
Start: 2019-01-25

## (undated) RX ORDER — BUPIVACAINE HYDROCHLORIDE 2.5 MG/ML
INJECTION, SOLUTION EPIDURAL; INFILTRATION; INTRACAUDAL
Status: DISPENSED
Start: 2023-01-01

## (undated) RX ORDER — ACETAMINOPHEN 325 MG/1
TABLET ORAL
Status: DISPENSED
Start: 2023-01-01

## (undated) RX ORDER — LIDOCAINE HYDROCHLORIDE 20 MG/ML
INJECTION, SOLUTION EPIDURAL; INFILTRATION; INTRACAUDAL; PERINEURAL
Status: DISPENSED
Start: 2019-01-04

## (undated) RX ORDER — FENTANYL CITRATE 0.05 MG/ML
INJECTION, SOLUTION INTRAMUSCULAR; INTRAVENOUS
Status: DISPENSED
Start: 2019-01-04

## (undated) RX ORDER — VERAPAMIL HYDROCHLORIDE 2.5 MG/ML
INJECTION, SOLUTION INTRAVENOUS
Status: DISPENSED
Start: 2023-01-01

## (undated) RX ORDER — PROTAMINE SULFATE 10 MG/ML
INJECTION, SOLUTION INTRAVENOUS
Status: DISPENSED
Start: 2019-01-04

## (undated) RX ORDER — HEPARIN SODIUM 1000 [USP'U]/ML
INJECTION, SOLUTION INTRAVENOUS; SUBCUTANEOUS
Status: DISPENSED
Start: 2019-01-11

## (undated) RX ORDER — LIDOCAINE HYDROCHLORIDE 10 MG/ML
INJECTION, SOLUTION EPIDURAL; INFILTRATION; INTRACAUDAL; PERINEURAL
Status: DISPENSED
Start: 2023-01-01

## (undated) RX ORDER — CEFAZOLIN SODIUM 1 G/3ML
INJECTION, POWDER, FOR SOLUTION INTRAMUSCULAR; INTRAVENOUS
Status: DISPENSED
Start: 2019-01-04

## (undated) RX ORDER — VANCOMYCIN HYDROCHLORIDE 1 G/20ML
INJECTION, POWDER, LYOPHILIZED, FOR SOLUTION INTRAVENOUS
Status: DISPENSED
Start: 2023-01-01

## (undated) RX ORDER — DEXTROSE MONOHYDRATE 25 G/50ML
INJECTION, SOLUTION INTRAVENOUS
Status: DISPENSED
Start: 2023-01-01

## (undated) RX ORDER — PROPOFOL 10 MG/ML
INJECTION, EMULSION INTRAVENOUS
Status: DISPENSED
Start: 2023-01-01

## (undated) RX ORDER — IODIXANOL 320 MG/ML
INJECTION, SOLUTION INTRAVASCULAR
Status: DISPENSED
Start: 2023-01-01

## (undated) RX ORDER — CEFAZOLIN SODIUM 1 G/3ML
INJECTION, POWDER, FOR SOLUTION INTRAMUSCULAR; INTRAVENOUS
Status: DISPENSED
Start: 2023-01-01

## (undated) RX ORDER — LIDOCAINE HYDROCHLORIDE AND EPINEPHRINE 10; 10 MG/ML; UG/ML
INJECTION, SOLUTION INFILTRATION; PERINEURAL
Status: DISPENSED
Start: 2023-01-01

## (undated) RX ORDER — NITROGLYCERIN 5 MG/ML
VIAL (ML) INTRAVENOUS
Status: DISPENSED
Start: 2023-01-01

## (undated) RX ORDER — HEPARIN SODIUM 200 [USP'U]/100ML
INJECTION, SOLUTION INTRAVENOUS
Status: DISPENSED
Start: 2023-01-01

## (undated) RX ORDER — FENTANYL CITRATE-0.9 % NACL/PF 10 MCG/ML
PLASTIC BAG, INJECTION (ML) INTRAVENOUS
Status: DISPENSED
Start: 2023-01-01

## (undated) RX ORDER — LIDOCAINE HYDROCHLORIDE 10 MG/ML
INJECTION, SOLUTION EPIDURAL; INFILTRATION; INTRACAUDAL; PERINEURAL
Status: DISPENSED
Start: 2019-01-11

## (undated) RX ORDER — PROPOFOL 10 MG/ML
INJECTION, EMULSION INTRAVENOUS
Status: DISPENSED
Start: 2019-01-04

## (undated) RX ORDER — FAMOTIDINE 20 MG/1
TABLET, FILM COATED ORAL
Status: DISPENSED
Start: 2023-01-01

## (undated) RX ORDER — BUPIVACAINE HYDROCHLORIDE 2.5 MG/ML
INJECTION, SOLUTION EPIDURAL; INFILTRATION; INTRACAUDAL
Status: DISPENSED
Start: 2019-01-11

## (undated) RX ORDER — DIAZEPAM 5 MG
TABLET ORAL
Status: DISPENSED
Start: 2023-01-01

## (undated) RX ORDER — ALBUTEROL SULFATE 0.83 MG/ML
SOLUTION RESPIRATORY (INHALATION)
Status: DISPENSED
Start: 2023-01-01

## (undated) RX ORDER — CLINDAMYCIN PHOSPHATE 900 MG/50ML
INJECTION, SOLUTION INTRAVENOUS
Status: DISPENSED
Start: 2023-01-01

## (undated) RX ORDER — ALBUMIN, HUMAN INJ 5% 5 %
SOLUTION INTRAVENOUS
Status: DISPENSED
Start: 2019-01-04

## (undated) RX ORDER — DEXAMETHASONE SODIUM PHOSPHATE 4 MG/ML
INJECTION, SOLUTION INTRA-ARTICULAR; INTRALESIONAL; INTRAMUSCULAR; INTRAVENOUS; SOFT TISSUE
Status: DISPENSED
Start: 2023-01-01

## (undated) RX ORDER — CLINDAMYCIN PHOSPHATE 900 MG/50ML
INJECTION, SOLUTION INTRAVENOUS
Status: DISPENSED
Start: 2019-01-11

## (undated) RX ORDER — HEPARIN SODIUM,PORCINE 10 UNIT/ML
VIAL (ML) INTRAVENOUS
Status: DISPENSED
Start: 2023-01-01